# Patient Record
Sex: MALE | Race: WHITE | NOT HISPANIC OR LATINO
[De-identification: names, ages, dates, MRNs, and addresses within clinical notes are randomized per-mention and may not be internally consistent; named-entity substitution may affect disease eponyms.]

---

## 2017-01-05 ENCOUNTER — APPOINTMENT (OUTPATIENT)
Age: 57
End: 2017-01-05

## 2017-03-02 ENCOUNTER — APPOINTMENT (OUTPATIENT)
Age: 57
End: 2017-03-02

## 2017-05-18 ENCOUNTER — APPOINTMENT (OUTPATIENT)
Age: 57
End: 2017-05-18

## 2017-05-18 ENCOUNTER — OUTPATIENT (OUTPATIENT)
Dept: OUTPATIENT SERVICES | Facility: HOSPITAL | Age: 57
LOS: 1 days | Discharge: HOME | End: 2017-05-18

## 2017-06-28 DIAGNOSIS — L89.314 PRESSURE ULCER OF RIGHT BUTTOCK, STAGE 4: ICD-10-CM

## 2017-06-28 DIAGNOSIS — L89.154 PRESSURE ULCER OF SACRAL REGION, STAGE 4: ICD-10-CM

## 2017-06-28 DIAGNOSIS — M53.3 SACROCOCCYGEAL DISORDERS, NOT ELSEWHERE CLASSIFIED: ICD-10-CM

## 2017-07-20 ENCOUNTER — APPOINTMENT (OUTPATIENT)
Age: 57
End: 2017-07-20

## 2017-07-20 ENCOUNTER — OUTPATIENT (OUTPATIENT)
Dept: OUTPATIENT SERVICES | Facility: HOSPITAL | Age: 57
LOS: 1 days | Discharge: HOME | End: 2017-07-20

## 2017-07-20 ENCOUNTER — INPATIENT (INPATIENT)
Facility: HOSPITAL | Age: 57
LOS: 6 days | Discharge: HOME HEALTH | End: 2017-07-27
Attending: PLASTIC SURGERY

## 2017-07-20 DIAGNOSIS — E11.9 TYPE 2 DIABETES MELLITUS WITHOUT COMPLICATIONS: ICD-10-CM

## 2017-07-20 DIAGNOSIS — L89.159 PRESSURE ULCER OF SACRAL REGION, UNSPECIFIED STAGE: ICD-10-CM

## 2017-07-20 DIAGNOSIS — E11.621 TYPE 2 DIABETES MELLITUS WITH FOOT ULCER: ICD-10-CM

## 2017-07-20 DIAGNOSIS — K21.9 GASTRO-ESOPHAGEAL REFLUX DISEASE WITHOUT ESOPHAGITIS: ICD-10-CM

## 2017-07-20 DIAGNOSIS — L89.309 PRESSURE ULCER OF UNSPECIFIED BUTTOCK, UNSPECIFIED STAGE: ICD-10-CM

## 2017-07-21 DIAGNOSIS — Z02.9 ENCOUNTER FOR ADMINISTRATIVE EXAMINATIONS, UNSPECIFIED: ICD-10-CM

## 2017-07-30 DIAGNOSIS — Z79.84 LONG TERM (CURRENT) USE OF ORAL HYPOGLYCEMIC DRUGS: ICD-10-CM

## 2017-07-30 DIAGNOSIS — G89.29 OTHER CHRONIC PAIN: ICD-10-CM

## 2017-07-30 DIAGNOSIS — E11.621 TYPE 2 DIABETES MELLITUS WITH FOOT ULCER: ICD-10-CM

## 2017-07-30 DIAGNOSIS — F17.210 NICOTINE DEPENDENCE, CIGARETTES, UNCOMPLICATED: ICD-10-CM

## 2017-07-30 DIAGNOSIS — G82.20 PARAPLEGIA, UNSPECIFIED: ICD-10-CM

## 2017-07-30 DIAGNOSIS — S34.101S: ICD-10-CM

## 2017-07-30 DIAGNOSIS — L97.514 NON-PRESSURE CHRONIC ULCER OF OTHER PART OF RIGHT FOOT WITH NECROSIS OF BONE: ICD-10-CM

## 2017-07-30 DIAGNOSIS — L89.154 PRESSURE ULCER OF SACRAL REGION, STAGE 4: ICD-10-CM

## 2017-07-30 DIAGNOSIS — L02.611 CUTANEOUS ABSCESS OF RIGHT FOOT: ICD-10-CM
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
## 2017-07-30 DIAGNOSIS — I96 GANGRENE, NOT ELSEWHERE CLASSIFIED: ICD-10-CM

## 2017-07-30 DIAGNOSIS — E11.69 TYPE 2 DIABETES MELLITUS WITH OTHER SPECIFIED COMPLICATION: ICD-10-CM

## 2017-07-30 DIAGNOSIS — M66.271 SPONTANEOUS RUPTURE OF EXTENSOR TENDONS, RIGHT ANKLE AND FOOT: ICD-10-CM

## 2017-07-30 DIAGNOSIS — Z79.891 LONG TERM (CURRENT) USE OF OPIATE ANALGESIC: ICD-10-CM

## 2017-07-30 DIAGNOSIS — L97.519 NON-PRESSURE CHRONIC ULCER OF OTHER PART OF RIGHT FOOT WITH UNSPECIFIED SEVERITY: ICD-10-CM

## 2017-07-30 DIAGNOSIS — M86.8X7 OTHER OSTEOMYELITIS, ANKLE AND FOOT: ICD-10-CM

## 2017-08-08 ENCOUNTER — OUTPATIENT (OUTPATIENT)
Dept: OUTPATIENT SERVICES | Facility: HOSPITAL | Age: 57
LOS: 1 days | Discharge: HOME | End: 2017-08-08

## 2017-08-08 ENCOUNTER — APPOINTMENT (OUTPATIENT)
Dept: PODIATRY | Facility: HOSPITAL | Age: 57
End: 2017-08-08

## 2017-08-08 DIAGNOSIS — L89.309 PRESSURE ULCER OF UNSPECIFIED BUTTOCK, UNSPECIFIED STAGE: ICD-10-CM

## 2017-08-08 DIAGNOSIS — E11.621 TYPE 2 DIABETES MELLITUS WITH FOOT ULCER: ICD-10-CM

## 2017-08-08 DIAGNOSIS — L89.159 PRESSURE ULCER OF SACRAL REGION, UNSPECIFIED STAGE: ICD-10-CM

## 2017-08-08 DIAGNOSIS — E11.9 TYPE 2 DIABETES MELLITUS WITHOUT COMPLICATIONS: ICD-10-CM

## 2017-08-08 DIAGNOSIS — K21.9 GASTRO-ESOPHAGEAL REFLUX DISEASE WITHOUT ESOPHAGITIS: ICD-10-CM

## 2017-08-09 DIAGNOSIS — I45.81 LONG QT SYNDROME: ICD-10-CM

## 2017-08-15 DIAGNOSIS — M20.5X9 OTHER DEFORMITIES OF TOE(S) (ACQUIRED), UNSPECIFIED FOOT: ICD-10-CM

## 2017-09-08 ENCOUNTER — INPATIENT (INPATIENT)
Facility: HOSPITAL | Age: 57
LOS: 12 days | Discharge: HOME HEALTH | End: 2017-09-21
Attending: PLASTIC SURGERY

## 2017-09-08 DIAGNOSIS — E11.621 TYPE 2 DIABETES MELLITUS WITH FOOT ULCER: ICD-10-CM

## 2017-09-08 DIAGNOSIS — L89.159 PRESSURE ULCER OF SACRAL REGION, UNSPECIFIED STAGE: ICD-10-CM

## 2017-09-08 DIAGNOSIS — K21.9 GASTRO-ESOPHAGEAL REFLUX DISEASE WITHOUT ESOPHAGITIS: ICD-10-CM

## 2017-09-08 DIAGNOSIS — L89.309 PRESSURE ULCER OF UNSPECIFIED BUTTOCK, UNSPECIFIED STAGE: ICD-10-CM

## 2017-09-08 DIAGNOSIS — E11.9 TYPE 2 DIABETES MELLITUS WITHOUT COMPLICATIONS: ICD-10-CM

## 2017-09-18 ENCOUNTER — APPOINTMENT (OUTPATIENT)
Age: 57
End: 2017-09-18
Payer: MEDICARE

## 2017-09-18 PROCEDURE — 75625 CONTRAST EXAM ABDOMINL AORTA: CPT | Mod: 26

## 2017-09-18 PROCEDURE — 37228: CPT | Mod: LT

## 2017-09-18 PROCEDURE — 37223: CPT | Mod: LT

## 2017-09-18 PROCEDURE — 76937 US GUIDE VASCULAR ACCESS: CPT | Mod: 26

## 2017-09-18 PROCEDURE — 75710 ARTERY X-RAYS ARM/LEG: CPT | Mod: 26,59

## 2017-09-18 PROCEDURE — 36247 INS CATH ABD/L-EXT ART 3RD: CPT | Mod: 59,LT

## 2017-09-26 DIAGNOSIS — Z86.73 PERSONAL HISTORY OF TRANSIENT ISCHEMIC ATTACK (TIA), AND CEREBRAL INFARCTION WITHOUT RESIDUAL DEFICITS: ICD-10-CM

## 2017-09-26 DIAGNOSIS — Z79.84 LONG TERM (CURRENT) USE OF ORAL HYPOGLYCEMIC DRUGS: ICD-10-CM

## 2017-09-26 DIAGNOSIS — E88.09 OTHER DISORDERS OF PLASMA-PROTEIN METABOLISM, NOT ELSEWHERE CLASSIFIED: ICD-10-CM

## 2017-09-26 DIAGNOSIS — E11.649 TYPE 2 DIABETES MELLITUS WITH HYPOGLYCEMIA WITHOUT COMA: ICD-10-CM

## 2017-09-26 DIAGNOSIS — G82.20 PARAPLEGIA, UNSPECIFIED: ICD-10-CM

## 2017-09-26 DIAGNOSIS — Z88.2 ALLERGY STATUS TO SULFONAMIDES: ICD-10-CM

## 2017-09-26 DIAGNOSIS — E11.621 TYPE 2 DIABETES MELLITUS WITH FOOT ULCER: ICD-10-CM

## 2017-09-26 DIAGNOSIS — L03.115 CELLULITIS OF RIGHT LOWER LIMB: ICD-10-CM

## 2017-09-26 DIAGNOSIS — L89.154 PRESSURE ULCER OF SACRAL REGION, STAGE 4: ICD-10-CM

## 2017-09-26 DIAGNOSIS — F11.20 OPIOID DEPENDENCE, UNCOMPLICATED: ICD-10-CM

## 2017-09-26 DIAGNOSIS — E11.52 TYPE 2 DIABETES MELLITUS WITH DIABETIC PERIPHERAL ANGIOPATHY WITH GANGRENE: ICD-10-CM

## 2017-09-26 DIAGNOSIS — L89.314 PRESSURE ULCER OF RIGHT BUTTOCK, STAGE 4: ICD-10-CM

## 2017-09-26 DIAGNOSIS — Z83.3 FAMILY HISTORY OF DIABETES MELLITUS: ICD-10-CM

## 2017-09-26 DIAGNOSIS — I10 ESSENTIAL (PRIMARY) HYPERTENSION: ICD-10-CM

## 2017-09-26 DIAGNOSIS — F17.200 NICOTINE DEPENDENCE, UNSPECIFIED, UNCOMPLICATED: ICD-10-CM

## 2017-09-26 DIAGNOSIS — L03.116 CELLULITIS OF LEFT LOWER LIMB: ICD-10-CM

## 2017-09-26 DIAGNOSIS — Z89.411 ACQUIRED ABSENCE OF RIGHT GREAT TOE: ICD-10-CM

## 2017-09-26 DIAGNOSIS — F12.10 CANNABIS ABUSE, UNCOMPLICATED: ICD-10-CM

## 2017-09-26 DIAGNOSIS — G89.4 CHRONIC PAIN SYNDROME: ICD-10-CM

## 2017-09-26 DIAGNOSIS — L97.419 NON-PRESSURE CHRONIC ULCER OF RIGHT HEEL AND MIDFOOT WITH UNSPECIFIED SEVERITY: ICD-10-CM

## 2017-09-26 DIAGNOSIS — D64.9 ANEMIA, UNSPECIFIED: ICD-10-CM

## 2017-09-26 DIAGNOSIS — L97.519 NON-PRESSURE CHRONIC ULCER OF OTHER PART OF RIGHT FOOT WITH UNSPECIFIED SEVERITY: ICD-10-CM

## 2017-09-26 DIAGNOSIS — L89.324 PRESSURE ULCER OF LEFT BUTTOCK, STAGE 4: ICD-10-CM

## 2017-09-26 DIAGNOSIS — L97.429 NON-PRESSURE CHRONIC ULCER OF LEFT HEEL AND MIDFOOT WITH UNSPECIFIED SEVERITY: ICD-10-CM

## 2017-09-26 DIAGNOSIS — Z99.3 DEPENDENCE ON WHEELCHAIR: ICD-10-CM

## 2017-09-26 DIAGNOSIS — Z51.89 ENCOUNTER FOR OTHER SPECIFIED AFTERCARE: ICD-10-CM

## 2017-09-26 DIAGNOSIS — F41.9 ANXIETY DISORDER, UNSPECIFIED: ICD-10-CM

## 2017-10-04 DIAGNOSIS — L97.529 NON-PRESSURE CHRONIC ULCER OF OTHER PART OF LEFT FOOT WITH UNSPECIFIED SEVERITY: ICD-10-CM

## 2017-10-05 ENCOUNTER — OUTPATIENT (OUTPATIENT)
Dept: OUTPATIENT SERVICES | Facility: HOSPITAL | Age: 57
LOS: 1 days | Discharge: HOME | End: 2017-10-05

## 2017-10-05 ENCOUNTER — APPOINTMENT (OUTPATIENT)
Age: 57
End: 2017-10-05

## 2017-10-05 DIAGNOSIS — L89.309 PRESSURE ULCER OF UNSPECIFIED BUTTOCK, UNSPECIFIED STAGE: ICD-10-CM

## 2017-10-05 DIAGNOSIS — L89.892 PRESSURE ULCER OF OTHER SITE, STAGE 2: ICD-10-CM

## 2017-10-05 DIAGNOSIS — E11.621 TYPE 2 DIABETES MELLITUS WITH FOOT ULCER: ICD-10-CM

## 2017-10-05 DIAGNOSIS — L89.154 PRESSURE ULCER OF SACRAL REGION, STAGE 4: ICD-10-CM

## 2017-10-05 DIAGNOSIS — K21.9 GASTRO-ESOPHAGEAL REFLUX DISEASE WITHOUT ESOPHAGITIS: ICD-10-CM

## 2017-10-05 DIAGNOSIS — E11.9 TYPE 2 DIABETES MELLITUS WITHOUT COMPLICATIONS: ICD-10-CM

## 2017-10-05 DIAGNOSIS — L89.159 PRESSURE ULCER OF SACRAL REGION, UNSPECIFIED STAGE: ICD-10-CM

## 2017-10-05 DIAGNOSIS — L89.314 PRESSURE ULCER OF RIGHT BUTTOCK, STAGE 4: ICD-10-CM

## 2017-10-10 ENCOUNTER — OUTPATIENT (OUTPATIENT)
Dept: OUTPATIENT SERVICES | Facility: HOSPITAL | Age: 57
LOS: 1 days | Discharge: HOME | End: 2017-10-10

## 2017-10-10 ENCOUNTER — APPOINTMENT (OUTPATIENT)
Dept: PODIATRY | Facility: HOSPITAL | Age: 57
End: 2017-10-10

## 2017-10-10 DIAGNOSIS — L89.159 PRESSURE ULCER OF SACRAL REGION, UNSPECIFIED STAGE: ICD-10-CM

## 2017-10-10 DIAGNOSIS — E11.621 TYPE 2 DIABETES MELLITUS WITH FOOT ULCER: ICD-10-CM

## 2017-10-10 DIAGNOSIS — E11.9 TYPE 2 DIABETES MELLITUS WITHOUT COMPLICATIONS: ICD-10-CM

## 2017-10-10 DIAGNOSIS — G82.20 PARAPLEGIA, UNSPECIFIED: ICD-10-CM

## 2017-10-10 DIAGNOSIS — K21.9 GASTRO-ESOPHAGEAL REFLUX DISEASE WITHOUT ESOPHAGITIS: ICD-10-CM

## 2017-10-10 DIAGNOSIS — L89.309 PRESSURE ULCER OF UNSPECIFIED BUTTOCK, UNSPECIFIED STAGE: ICD-10-CM

## 2017-10-11 ENCOUNTER — APPOINTMENT (OUTPATIENT)
Dept: VASCULAR SURGERY | Facility: CLINIC | Age: 57
End: 2017-10-11
Payer: MEDICARE

## 2017-10-11 VITALS
SYSTOLIC BLOOD PRESSURE: 130 MMHG | DIASTOLIC BLOOD PRESSURE: 80 MMHG | WEIGHT: 180 LBS | BODY MASS INDEX: 23.86 KG/M2 | HEIGHT: 73 IN

## 2017-10-11 DIAGNOSIS — L89.892 PRESSURE ULCER OF OTHER SITE, STAGE 2: ICD-10-CM

## 2017-10-11 PROCEDURE — 99213 OFFICE O/P EST LOW 20 MIN: CPT

## 2017-10-11 PROCEDURE — 93926 LOWER EXTREMITY STUDY: CPT

## 2017-10-30 ENCOUNTER — APPOINTMENT (OUTPATIENT)
Dept: VASCULAR SURGERY | Facility: HOSPITAL | Age: 57
End: 2017-10-30
Payer: MEDICARE

## 2017-10-30 ENCOUNTER — OUTPATIENT (OUTPATIENT)
Dept: OUTPATIENT SERVICES | Facility: HOSPITAL | Age: 57
LOS: 1 days | Discharge: HOME | End: 2017-10-30

## 2017-10-30 DIAGNOSIS — L89.159 PRESSURE ULCER OF SACRAL REGION, UNSPECIFIED STAGE: ICD-10-CM

## 2017-10-30 DIAGNOSIS — K21.9 GASTRO-ESOPHAGEAL REFLUX DISEASE WITHOUT ESOPHAGITIS: ICD-10-CM

## 2017-10-30 DIAGNOSIS — L89.309 PRESSURE ULCER OF UNSPECIFIED BUTTOCK, UNSPECIFIED STAGE: ICD-10-CM

## 2017-10-30 DIAGNOSIS — E11.9 TYPE 2 DIABETES MELLITUS WITHOUT COMPLICATIONS: ICD-10-CM

## 2017-10-30 DIAGNOSIS — E11.621 TYPE 2 DIABETES MELLITUS WITH FOOT ULCER: ICD-10-CM

## 2017-10-30 PROCEDURE — 36247 INS CATH ABD/L-EXT ART 3RD: CPT | Mod: 59,RT

## 2017-10-30 PROCEDURE — 37229: CPT | Mod: RT

## 2017-10-30 PROCEDURE — 76937 US GUIDE VASCULAR ACCESS: CPT | Mod: 26

## 2017-10-30 PROCEDURE — 75710 ARTERY X-RAYS ARM/LEG: CPT | Mod: 26,59

## 2017-11-01 DIAGNOSIS — I70.235 ATHEROSCLEROSIS OF NATIVE ARTERIES OF RIGHT LEG WITH ULCERATION OF OTHER PART OF FOOT: ICD-10-CM

## 2017-11-01 DIAGNOSIS — Z88.2 ALLERGY STATUS TO SULFONAMIDES: ICD-10-CM

## 2017-11-01 DIAGNOSIS — I70.234 ATHEROSCLEROSIS OF NATIVE ARTERIES OF RIGHT LEG WITH ULCERATION OF HEEL AND MIDFOOT: ICD-10-CM

## 2017-11-09 ENCOUNTER — APPOINTMENT (OUTPATIENT)
Age: 57
End: 2017-11-09

## 2017-11-09 ENCOUNTER — OUTPATIENT (OUTPATIENT)
Dept: OUTPATIENT SERVICES | Facility: HOSPITAL | Age: 57
LOS: 1 days | Discharge: HOME | End: 2017-11-09

## 2017-11-09 DIAGNOSIS — E11.9 TYPE 2 DIABETES MELLITUS WITHOUT COMPLICATIONS: ICD-10-CM

## 2017-11-09 DIAGNOSIS — L89.159 PRESSURE ULCER OF SACRAL REGION, UNSPECIFIED STAGE: ICD-10-CM

## 2017-11-09 DIAGNOSIS — L89.309 PRESSURE ULCER OF UNSPECIFIED BUTTOCK, UNSPECIFIED STAGE: ICD-10-CM

## 2017-11-09 DIAGNOSIS — K21.9 GASTRO-ESOPHAGEAL REFLUX DISEASE WITHOUT ESOPHAGITIS: ICD-10-CM

## 2017-11-09 DIAGNOSIS — E11.621 TYPE 2 DIABETES MELLITUS WITH FOOT ULCER: ICD-10-CM

## 2017-11-15 ENCOUNTER — APPOINTMENT (OUTPATIENT)
Dept: VASCULAR SURGERY | Facility: CLINIC | Age: 57
End: 2017-11-15
Payer: MEDICARE

## 2017-11-15 DIAGNOSIS — M53.3 SACROCOCCYGEAL DISORDERS, NOT ELSEWHERE CLASSIFIED: ICD-10-CM

## 2017-11-15 DIAGNOSIS — L89.622 PRESSURE ULCER OF LEFT HEEL, STAGE 2: ICD-10-CM

## 2017-11-15 DIAGNOSIS — L89.612 PRESSURE ULCER OF RIGHT HEEL, STAGE 2: ICD-10-CM

## 2017-11-15 DIAGNOSIS — L89.154 PRESSURE ULCER OF SACRAL REGION, STAGE 4: ICD-10-CM

## 2017-11-15 PROCEDURE — 93926 LOWER EXTREMITY STUDY: CPT

## 2017-12-14 ENCOUNTER — APPOINTMENT (OUTPATIENT)
Age: 57
End: 2017-12-14

## 2017-12-28 ENCOUNTER — INPATIENT (INPATIENT)
Facility: HOSPITAL | Age: 57
LOS: 11 days | Discharge: HOME HEALTH | End: 2018-01-09
Attending: PLASTIC SURGERY

## 2017-12-28 ENCOUNTER — OUTPATIENT (OUTPATIENT)
Dept: OUTPATIENT SERVICES | Facility: HOSPITAL | Age: 57
LOS: 1 days | Discharge: HOME | End: 2017-12-28

## 2017-12-28 ENCOUNTER — APPOINTMENT (OUTPATIENT)
Age: 57
End: 2017-12-28

## 2017-12-28 DIAGNOSIS — L89.159 PRESSURE ULCER OF SACRAL REGION, UNSPECIFIED STAGE: ICD-10-CM

## 2017-12-28 DIAGNOSIS — L89.309 PRESSURE ULCER OF UNSPECIFIED BUTTOCK, UNSPECIFIED STAGE: ICD-10-CM

## 2017-12-28 DIAGNOSIS — E11.9 TYPE 2 DIABETES MELLITUS WITHOUT COMPLICATIONS: ICD-10-CM

## 2017-12-28 DIAGNOSIS — E11.621 TYPE 2 DIABETES MELLITUS WITH FOOT ULCER: ICD-10-CM

## 2017-12-28 DIAGNOSIS — K21.9 GASTRO-ESOPHAGEAL REFLUX DISEASE WITHOUT ESOPHAGITIS: ICD-10-CM

## 2017-12-29 DIAGNOSIS — Z02.9 ENCOUNTER FOR ADMINISTRATIVE EXAMINATIONS, UNSPECIFIED: ICD-10-CM

## 2018-01-14 DIAGNOSIS — L89.614 PRESSURE ULCER OF RIGHT HEEL, STAGE 4: ICD-10-CM

## 2018-01-14 DIAGNOSIS — G82.20 PARAPLEGIA, UNSPECIFIED: ICD-10-CM

## 2018-01-14 DIAGNOSIS — I10 ESSENTIAL (PRIMARY) HYPERTENSION: ICD-10-CM

## 2018-01-14 DIAGNOSIS — L03.115 CELLULITIS OF RIGHT LOWER LIMB: ICD-10-CM

## 2018-01-14 DIAGNOSIS — L89.514 PRESSURE ULCER OF RIGHT ANKLE, STAGE 4: ICD-10-CM
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
## 2018-01-14 DIAGNOSIS — S32.011S: ICD-10-CM

## 2018-01-14 DIAGNOSIS — F64.9 GENDER IDENTITY DISORDER, UNSPECIFIED: ICD-10-CM

## 2018-01-14 DIAGNOSIS — M86.171 OTHER ACUTE OSTEOMYELITIS, RIGHT ANKLE AND FOOT: ICD-10-CM

## 2018-01-14 DIAGNOSIS — K21.9 GASTRO-ESOPHAGEAL REFLUX DISEASE WITHOUT ESOPHAGITIS: ICD-10-CM
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
## 2018-01-14 DIAGNOSIS — E11.69 TYPE 2 DIABETES MELLITUS WITH OTHER SPECIFIED COMPLICATION: ICD-10-CM

## 2018-01-14 DIAGNOSIS — Z96.0 PRESENCE OF UROGENITAL IMPLANTS: ICD-10-CM

## 2018-01-14 DIAGNOSIS — L97.514 NON-PRESSURE CHRONIC ULCER OF OTHER PART OF RIGHT FOOT WITH NECROSIS OF BONE: ICD-10-CM

## 2018-01-14 DIAGNOSIS — S34.101S: ICD-10-CM

## 2018-01-14 DIAGNOSIS — F12.90 CANNABIS USE, UNSPECIFIED, UNCOMPLICATED: ICD-10-CM

## 2018-01-14 DIAGNOSIS — E11.621 TYPE 2 DIABETES MELLITUS WITH FOOT ULCER: ICD-10-CM

## 2018-01-14 DIAGNOSIS — I96 GANGRENE, NOT ELSEWHERE CLASSIFIED: ICD-10-CM

## 2018-01-14 DIAGNOSIS — L89.153 PRESSURE ULCER OF SACRAL REGION, STAGE 3: ICD-10-CM

## 2018-01-14 DIAGNOSIS — F17.210 NICOTINE DEPENDENCE, CIGARETTES, UNCOMPLICATED: ICD-10-CM

## 2018-02-01 ENCOUNTER — OUTPATIENT (OUTPATIENT)
Dept: OUTPATIENT SERVICES | Facility: HOSPITAL | Age: 58
LOS: 1 days | Discharge: HOME | End: 2018-02-01

## 2018-02-01 ENCOUNTER — APPOINTMENT (OUTPATIENT)
Age: 58
End: 2018-02-01

## 2018-02-01 DIAGNOSIS — L02.611 CUTANEOUS ABSCESS OF RIGHT FOOT: ICD-10-CM

## 2018-02-01 DIAGNOSIS — L89.314 PRESSURE ULCER OF RIGHT BUTTOCK, STAGE 4: ICD-10-CM

## 2018-02-01 DIAGNOSIS — Z89.419 ACQUIRED ABSENCE OF UNSPECIFIED GREAT TOE: ICD-10-CM

## 2018-02-01 DIAGNOSIS — L89.154 PRESSURE ULCER OF SACRAL REGION, STAGE 4: ICD-10-CM

## 2018-02-01 DIAGNOSIS — L89.892 PRESSURE ULCER OF OTHER SITE, STAGE 2: ICD-10-CM

## 2018-02-04 DIAGNOSIS — L89.159 PRESSURE ULCER OF SACRAL REGION, UNSPECIFIED STAGE: ICD-10-CM

## 2018-02-04 DIAGNOSIS — K21.9 GASTRO-ESOPHAGEAL REFLUX DISEASE WITHOUT ESOPHAGITIS: ICD-10-CM

## 2018-02-04 DIAGNOSIS — E11.621 TYPE 2 DIABETES MELLITUS WITH FOOT ULCER: ICD-10-CM

## 2018-02-04 DIAGNOSIS — E11.9 TYPE 2 DIABETES MELLITUS WITHOUT COMPLICATIONS: ICD-10-CM

## 2018-02-04 DIAGNOSIS — L89.309 PRESSURE ULCER OF UNSPECIFIED BUTTOCK, UNSPECIFIED STAGE: ICD-10-CM

## 2018-02-12 DIAGNOSIS — L02.611 CUTANEOUS ABSCESS OF RIGHT FOOT: ICD-10-CM

## 2018-02-12 DIAGNOSIS — L89.622 PRESSURE ULCER OF LEFT HEEL, STAGE 2: ICD-10-CM

## 2018-02-12 DIAGNOSIS — L89.314 PRESSURE ULCER OF RIGHT BUTTOCK, STAGE 4: ICD-10-CM

## 2018-02-12 DIAGNOSIS — L89.154 PRESSURE ULCER OF SACRAL REGION, STAGE 4: ICD-10-CM

## 2018-02-12 DIAGNOSIS — L89.612 PRESSURE ULCER OF RIGHT HEEL, STAGE 2: ICD-10-CM

## 2018-03-07 ENCOUNTER — EMERGENCY (EMERGENCY)
Facility: HOSPITAL | Age: 58
LOS: 0 days | Discharge: HOME | End: 2018-03-08
Attending: EMERGENCY MEDICINE

## 2018-03-07 VITALS
DIASTOLIC BLOOD PRESSURE: 147 MMHG | OXYGEN SATURATION: 98 % | TEMPERATURE: 98 F | SYSTOLIC BLOOD PRESSURE: 239 MMHG | WEIGHT: 154.98 LBS | HEIGHT: 71 IN | RESPIRATION RATE: 18 BRPM | HEART RATE: 83 BPM

## 2018-03-07 DIAGNOSIS — R47.81 SLURRED SPEECH: ICD-10-CM

## 2018-03-07 DIAGNOSIS — F11.90 OPIOID USE, UNSPECIFIED, UNCOMPLICATED: ICD-10-CM

## 2018-03-07 DIAGNOSIS — T42.4X5A ADVERSE EFFECT OF BENZODIAZEPINES, INITIAL ENCOUNTER: ICD-10-CM

## 2018-03-07 NOTE — ED PROVIDER NOTE - CARE PLAN
Principal Discharge DX:	Benzodiazepine causing adverse effect in therapeutic use  Secondary Diagnosis:	Opiate use

## 2018-03-07 NOTE — ED PROVIDER NOTE - NS ED ROS FT
Constitutional: no fever, chills, no recent weight loss  Cardiac: No chest pain, SOB or edema.  Respiratory: No cough or respiratory distress  GI: No nausea, vomiting, diarrhea or abdominal pain.  : No dysuria, frequency, urgency or hematuria  MS: no pain to back or extremities, no loss of ROM, no weakness  Neuro: paraplegia  Skin: No skin rash.  Except as documented in the HPI, all other systems are negative.

## 2018-03-07 NOTE — ED ADULT TRIAGE NOTE - CHIEF COMPLAINT QUOTE
BIBA, found unconscious behind the  wheel of his car, patient awake, talking, and reports he does not know what happened that he did not get enough sleep

## 2018-03-07 NOTE — ED PROVIDER NOTE - OBJECTIVE STATEMENT
56 yo M with history of paraplegia, on multiple narcotics, methadone, roxycodone, valium and xanax for chronic pain, muscle spasm etc, brought in by EMS after being found in his car, facing opposite direction on [facility], asleep. No signs of damage to the car, no physical signs of trauma. The patient is awake, alert, has slightly slurred speech but has no complaints. He is not bradypneic or bradycardic.

## 2018-03-07 NOTE — ED PROVIDER NOTE - PHYSICAL EXAMINATION
VITAL SIGNS: I have reviewed nursing notes and confirm.  CONSTITUTIONAL: Well-developed; well-nourished  SKIN: Skin exam is warm and dry, large decub to buttock, posterior thigh  HEAD: Normocephalic; atraumatic.  EYES: pupils are small but reactive  ENT: No nasal discharge  NECK: Supple; non tender.  CARD: Regular rate and rhythm.  RESP: No wheezes, rales or rhonchi.  ABD: Normal bowel sounds; soft; non-distended; non-tender  EXT: contracted at ankles, partial paralysis bilateral LE  NEURO: answering questions, mildly slurred speech, moving upper extremities, CN II-XII intacts  PSYCH: Cooperative, appropriate.

## 2018-03-07 NOTE — ED PROVIDER NOTE - MEDICAL DECISION MAKING DETAILS
Patient has maintained normal VS -- is awake, alert, in no distress, has normal sized reactive pupils. Transient AMS likely related to prescription drug use. Will dc home.

## 2018-03-08 VITALS
RESPIRATION RATE: 18 BRPM | SYSTOLIC BLOOD PRESSURE: 143 MMHG | OXYGEN SATURATION: 96 % | HEART RATE: 78 BPM | DIASTOLIC BLOOD PRESSURE: 75 MMHG

## 2018-03-15 ENCOUNTER — APPOINTMENT (OUTPATIENT)
Age: 58
End: 2018-03-15

## 2018-05-16 ENCOUNTER — APPOINTMENT (OUTPATIENT)
Dept: VASCULAR SURGERY | Facility: CLINIC | Age: 58
End: 2018-05-16

## 2018-07-17 PROBLEM — S32.000A WEDGE COMPRESSION FRACTURE OF UNSPECIFIED LUMBAR VERTEBRA, INITIAL ENCOUNTER FOR CLOSED FRACTURE: Chronic | Status: ACTIVE | Noted: 2018-03-07

## 2018-07-17 PROBLEM — E11.9 TYPE 2 DIABETES MELLITUS WITHOUT COMPLICATIONS: Chronic | Status: ACTIVE | Noted: 2018-03-07

## 2018-07-26 ENCOUNTER — INPATIENT (INPATIENT)
Facility: HOSPITAL | Age: 58
LOS: 9 days | Discharge: HOME HEALTH | End: 2018-08-05
Attending: INTERNAL MEDICINE | Admitting: INTERNAL MEDICINE
Payer: MEDICARE

## 2018-07-26 VITALS
HEART RATE: 98 BPM | TEMPERATURE: 98 F | SYSTOLIC BLOOD PRESSURE: 105 MMHG | RESPIRATION RATE: 18 BRPM | OXYGEN SATURATION: 98 % | DIASTOLIC BLOOD PRESSURE: 54 MMHG

## 2018-07-26 DIAGNOSIS — Z98.890 OTHER SPECIFIED POSTPROCEDURAL STATES: Chronic | ICD-10-CM

## 2018-07-26 LAB
ALBUMIN SERPL ELPH-MCNC: 2.3 G/DL — LOW (ref 3.5–5.2)
ALP SERPL-CCNC: 131 U/L — HIGH (ref 30–115)
ALT FLD-CCNC: 8 U/L — SIGNIFICANT CHANGE UP (ref 0–41)
ANION GAP SERPL CALC-SCNC: 17 MMOL/L — HIGH (ref 7–14)
APPEARANCE UR: ABNORMAL
APTT BLD: 36.8 SEC — SIGNIFICANT CHANGE UP (ref 27–39.2)
AST SERPL-CCNC: 13 U/L — SIGNIFICANT CHANGE UP (ref 0–41)
BASOPHILS # BLD AUTO: 0.03 K/UL — SIGNIFICANT CHANGE UP (ref 0–0.2)
BASOPHILS NFR BLD AUTO: 0.2 % — SIGNIFICANT CHANGE UP (ref 0–1)
BILIRUB SERPL-MCNC: <0.2 MG/DL — SIGNIFICANT CHANGE UP (ref 0.2–1.2)
BILIRUB UR-MCNC: ABNORMAL
BUN SERPL-MCNC: 34 MG/DL — HIGH (ref 10–20)
CALCIUM SERPL-MCNC: 8.3 MG/DL — LOW (ref 8.5–10.1)
CHLORIDE SERPL-SCNC: 95 MMOL/L — LOW (ref 98–110)
CO2 SERPL-SCNC: 26 MMOL/L — SIGNIFICANT CHANGE UP (ref 17–32)
COLOR SPEC: ABNORMAL
CREAT SERPL-MCNC: 1.3 MG/DL — SIGNIFICANT CHANGE UP (ref 0.7–1.5)
DIFF PNL FLD: ABNORMAL
EOSINOPHIL # BLD AUTO: 0.01 K/UL — SIGNIFICANT CHANGE UP (ref 0–0.7)
EOSINOPHIL NFR BLD AUTO: 0.1 % — SIGNIFICANT CHANGE UP (ref 0–8)
GLUCOSE SERPL-MCNC: 99 MG/DL — SIGNIFICANT CHANGE UP (ref 70–99)
GLUCOSE UR QL: NEGATIVE — SIGNIFICANT CHANGE UP
HCT VFR BLD CALC: 31.4 % — LOW (ref 42–52)
HGB BLD-MCNC: 9.9 G/DL — LOW (ref 14–18)
IMM GRANULOCYTES NFR BLD AUTO: 0.8 % — HIGH (ref 0.1–0.3)
INR BLD: 2.35 RATIO — HIGH (ref 0.65–1.3)
KETONES UR-MCNC: NEGATIVE — SIGNIFICANT CHANGE UP
LACTATE SERPL-SCNC: 1.7 MMOL/L — SIGNIFICANT CHANGE UP (ref 0.5–2.2)
LEUKOCYTE ESTERASE UR-ACNC: ABNORMAL
LIDOCAIN IGE QN: 9 U/L — SIGNIFICANT CHANGE UP (ref 7–60)
LYMPHOCYTES # BLD AUTO: 1.87 K/UL — SIGNIFICANT CHANGE UP (ref 1.2–3.4)
LYMPHOCYTES # BLD AUTO: 14.1 % — LOW (ref 20.5–51.1)
MCHC RBC-ENTMCNC: 24.1 PG — LOW (ref 27–31)
MCHC RBC-ENTMCNC: 31.5 G/DL — LOW (ref 32–37)
MCV RBC AUTO: 76.4 FL — LOW (ref 80–94)
MONOCYTES # BLD AUTO: 1.03 K/UL — HIGH (ref 0.1–0.6)
MONOCYTES NFR BLD AUTO: 7.8 % — SIGNIFICANT CHANGE UP (ref 1.7–9.3)
NEUTROPHILS # BLD AUTO: 10.21 K/UL — HIGH (ref 1.4–6.5)
NEUTROPHILS NFR BLD AUTO: 77 % — HIGH (ref 42.2–75.2)
NITRITE UR-MCNC: POSITIVE
PH UR: 6 — SIGNIFICANT CHANGE UP (ref 5–8)
PLATELET # BLD AUTO: 514 K/UL — HIGH (ref 130–400)
POTASSIUM SERPL-MCNC: 4.7 MMOL/L — SIGNIFICANT CHANGE UP (ref 3.5–5)
POTASSIUM SERPL-SCNC: 4.7 MMOL/L — SIGNIFICANT CHANGE UP (ref 3.5–5)
PROT SERPL-MCNC: 6.1 G/DL — SIGNIFICANT CHANGE UP (ref 6–8)
PROT UR-MCNC: >=300
PROTHROM AB SERPL-ACNC: 25.2 SEC — HIGH (ref 9.95–12.87)
RBC # BLD: 4.11 M/UL — LOW (ref 4.7–6.1)
RBC # FLD: 15.8 % — HIGH (ref 11.5–14.5)
SODIUM SERPL-SCNC: 138 MMOL/L — SIGNIFICANT CHANGE UP (ref 135–146)
SP GR SPEC: >=1.03 — SIGNIFICANT CHANGE UP (ref 1.01–1.03)
UROBILINOGEN FLD QL: 1 (ref 0.2–0.2)
WBC # BLD: 13.25 K/UL — HIGH (ref 4.8–10.8)
WBC # FLD AUTO: 13.25 K/UL — HIGH (ref 4.8–10.8)

## 2018-07-26 RX ORDER — CEFTRIAXONE 500 MG/1
1 INJECTION, POWDER, FOR SOLUTION INTRAMUSCULAR; INTRAVENOUS ONCE
Qty: 0 | Refills: 0 | Status: COMPLETED | OUTPATIENT
Start: 2018-07-26 | End: 2018-07-26

## 2018-07-26 RX ORDER — KETOROLAC TROMETHAMINE 30 MG/ML
15 SYRINGE (ML) INJECTION ONCE
Qty: 0 | Refills: 0 | Status: DISCONTINUED | OUTPATIENT
Start: 2018-07-26 | End: 2018-07-26

## 2018-07-26 RX ORDER — SODIUM CHLORIDE 9 MG/ML
1000 INJECTION, SOLUTION INTRAVENOUS ONCE
Qty: 0 | Refills: 0 | Status: COMPLETED | OUTPATIENT
Start: 2018-07-26 | End: 2018-07-26

## 2018-07-26 RX ORDER — ONDANSETRON 8 MG/1
4 TABLET, FILM COATED ORAL ONCE
Qty: 0 | Refills: 0 | Status: COMPLETED | OUTPATIENT
Start: 2018-07-26 | End: 2018-07-26

## 2018-07-26 RX ORDER — IOHEXOL 300 MG/ML
30 INJECTION, SOLUTION INTRAVENOUS ONCE
Qty: 0 | Refills: 0 | Status: COMPLETED | OUTPATIENT
Start: 2018-07-26 | End: 2018-07-26

## 2018-07-26 RX ORDER — SODIUM CHLORIDE 9 MG/ML
1000 INJECTION INTRAMUSCULAR; INTRAVENOUS; SUBCUTANEOUS ONCE
Qty: 0 | Refills: 0 | Status: COMPLETED | OUTPATIENT
Start: 2018-07-26 | End: 2018-07-26

## 2018-07-26 RX ORDER — IOHEXOL 300 MG/ML
30 INJECTION, SOLUTION INTRAVENOUS ONCE
Qty: 0 | Refills: 0 | Status: DISCONTINUED | OUTPATIENT
Start: 2018-07-26 | End: 2018-07-26

## 2018-07-26 RX ORDER — MORPHINE SULFATE 50 MG/1
8 CAPSULE, EXTENDED RELEASE ORAL ONCE
Qty: 0 | Refills: 0 | Status: DISCONTINUED | OUTPATIENT
Start: 2018-07-26 | End: 2018-07-26

## 2018-07-26 RX ADMIN — SODIUM CHLORIDE 1000 MILLILITER(S): 9 INJECTION, SOLUTION INTRAVENOUS at 19:20

## 2018-07-26 RX ADMIN — SODIUM CHLORIDE 1000 MILLILITER(S): 9 INJECTION, SOLUTION INTRAVENOUS at 22:37

## 2018-07-26 RX ADMIN — CEFTRIAXONE 100 GRAM(S): 500 INJECTION, POWDER, FOR SOLUTION INTRAMUSCULAR; INTRAVENOUS at 17:50

## 2018-07-26 RX ADMIN — SODIUM CHLORIDE 1000 MILLILITER(S): 9 INJECTION, SOLUTION INTRAVENOUS at 20:42

## 2018-07-26 RX ADMIN — SODIUM CHLORIDE 1000 MILLILITER(S): 9 INJECTION, SOLUTION INTRAVENOUS at 22:22

## 2018-07-26 RX ADMIN — Medication 15 MILLIGRAM(S): at 20:42

## 2018-07-26 RX ADMIN — Medication 15 MILLIGRAM(S): at 22:59

## 2018-07-26 RX ADMIN — SODIUM CHLORIDE 1000 MILLILITER(S): 9 INJECTION INTRAMUSCULAR; INTRAVENOUS; SUBCUTANEOUS at 19:14

## 2018-07-26 RX ADMIN — SODIUM CHLORIDE 2000 MILLILITER(S): 9 INJECTION INTRAMUSCULAR; INTRAVENOUS; SUBCUTANEOUS at 15:00

## 2018-07-26 RX ADMIN — CEFTRIAXONE 1 GRAM(S): 500 INJECTION, POWDER, FOR SOLUTION INTRAMUSCULAR; INTRAVENOUS at 19:14

## 2018-07-26 NOTE — ED PROVIDER NOTE - NS ED ROS FT
Constitutional: No fever  ENMT:  No neck pain  Cardiac:  No chest pain  Respiratory:  No cough, SOB  GI:  No nausea, vomiting, diarrhea. +abdominal pain, constipation  :  +suprapubic cath  MS:  No back pain  Neuro:  No headache or lightheadedness  Skin:  No skin rash  Endocrine: h/o DM

## 2018-07-26 NOTE — ED PROVIDER NOTE - PHYSICAL EXAMINATION
Vital signs reviewed  GENERAL: Patient nontoxic appearing, NAD  HEAD: NCAT  EYES: Anicteric  ENT: Dry MM  NECK: Supple, non tender  RESPIRATORY: Normal respiratory effort. CTA B/L  CARDIOVASCULAR: Regular rate and rhythm. No murmurs, rubs or gallops.  ABDOMEN: Soft. Nondistended. Diffuse moderate abd tenderness without guarding or rebound  MUSCULOSKELETAL/EXTREMITIES: Brisk cap refill. 2+ radial pulses. Atrophied lower extremities.   SKIN:  Warm and dry. B/L heel ulcers and large sacral ulcer, healing, no surrounding erythema or purulent discharge. Suprapubic cath site clean, no erythema or discharge.   NEURO: AAOx3. B/L lower extremity weakness (chronic). Speech clear and coherent.   PSYCHIATRIC: Cooperative. Affect appropriate

## 2018-07-26 NOTE — ED ADULT NURSE NOTE - OBJECTIVE STATEMENT
Pt c/o generalized abd pain, weight loss 180lbs to 150lbs the past 1 month and 5 stage IV pressure ulcers to sacrum, b/l buttock, and b/l heels. Pt also has suprapubic cath in place. Denies n/v/ or any other symptoms.

## 2018-07-26 NOTE — ED ADULT NURSE NOTE - CAS EDN DISCHARGE ASSESSMENT
Patient baseline mental status/Dressing clean and dry/Alert and oriented to person, place and time/Awake

## 2018-07-26 NOTE — ED PROVIDER NOTE - MEDICAL DECISION MAKING DETAILS
I personally evaluated the patient. I reviewed the Resident’s or Physician Assistant’s note (as assigned above), and agree with the findings and plan except as documented in my note.    patient admitted for further management and care. Patient with possible partial SBO vs ileus. Surgery on the case. Patient admitted in stable condition.

## 2018-07-26 NOTE — ED PROVIDER NOTE - ATTENDING CONTRIBUTION TO CARE
I personally evaluated the patient. I reviewed the Resident’s or Physician Assistant’s note (as assigned above), and agree with the findings and plan except as documented in my note.    58 y/o M with hx of lumbar compression fx, DM, suprapubic catheter presents with lower abd pain for the past few days. Patient denies any fevers, chills, n/v. No CP, SOB. I personally evaluated the patient. I reviewed the Resident’s or Physician Assistant’s note (as assigned above), and agree with the findings and plan except as documented in my note.    58 y/o M with hx of lumbar compression fx with LE weakness, DM, suprapubic catheter presents with lower abd pain for the past few days. Patient denies any fevers, chills, n/v. No CP, SOB.    CONSTITUTIONAL: Well-developed; well-nourished; in no acute distress. Sitting up and providing appropriate history and physical examination  SKIN: skin exam is warm and dry, no acute rash.  HEAD: Normocephalic; atraumatic.  EYES: PERRL, 3 mm bilateral, no nystagmus, EOM intact; conjunctiva and sclera clear.  ENT: No nasal discharge; airway clear.  NECK: Supple; non tender.+ full passive ROM in all directions. No JVD  CARD: S1, S2 normal; no murmurs, gallops, or rubs. Regular rate and rhythm. + Symmetric Strong Pulses  RESP: No wheezes, rales or rhonchi. Good air movement bilaterally  ABD: mild ttp diffusely.   EXT: Normal ROM. 2/5 LE weakness- baselines. Otherwise neuro exam unremakeable    Plan: IVF, labs, CT abd/pelvis, reassess

## 2018-07-26 NOTE — ED PROVIDER NOTE - OBJECTIVE STATEMENT
56yo M with PMHx DM, B/L LE weakness 2/2 previous lumbar trauma (able to stand but cannot walk), sacral decubiti and heel ulcers, suprapubic cath, p/w diffuse abdominal pain since yesterday. Pt unable to tolerate PO or take meds 2/2 pain. Pt states on 320mg methadone and 240mg oxycontin daily. Took total of 8 percocets in past 12 hours. Denies fever, headache, lightheadedness, CP, SOB, cough, nausea, vomiting, diarrhea, abd pain. Pt had small BM in ED, no blood or melena. 58yo M with PMHx DM, B/L LE weakness 2/2 previous lumbar trauma (able to stand but cannot walk), sacral decubiti and heel ulcers, suprapubic cath, p/w diffuse abdominal pain since yesterday. Pt unable to tolerate PO or take meds 2/2 pain. Pt states on 320mg methadone and 240mg oxycontin daily. Took total of 8 percocets in past 12 hours. Denies fever, headache, lightheadedness, CP, SOB, cough, nausea, vomiting, diarrhea, abd pain. Pt had BM yesterday as well as small BM in ED, no blood or melena.

## 2018-07-26 NOTE — ED ADULT NURSE REASSESSMENT NOTE - NS ED NURSE REASSESS COMMENT FT1
Pt hypotensive but asymptomatic. MD aware. Awaiting for CT result. IV abx given per MD order. Family at bedside. Will cont to monitor.
Pt refused oral contrast despite education. MD stated he will follow up with pt. Will cont to monitor
Pt assessed. Awaiting for surgery team. t&p q2hrs. Family at bedside. Pt refused pressure ulcers dsg change at this time despite education, stating he changed it today. Toradol given for pain management. No concerns at this time. Will cont to monitor.

## 2018-07-26 NOTE — ED PROVIDER NOTE - CARE PLAN
Principal Discharge DX:	Sepsis  Secondary Diagnosis:	UTI (urinary tract infection) Principal Discharge DX:	Abdominal pain  Secondary Diagnosis:	UTI (urinary tract infection)  Secondary Diagnosis:	Sacral decubitus ulcer

## 2018-07-26 NOTE — ED PROVIDER NOTE - PROGRESS NOTE DETAILS
Spoke with surg resident Arvin who came to see patient, recommending CT with oral contrast Spoke with burn PA. Will f/u in am s/o to Dr. Abreu. f/u CT with PO contrast and surgery. Hypotensive, reassess after IVF. s/o to Dr. Abreu. f/u CT with PO contrast and surgery c/s. Pt with UTI, has received ABx and IVF. Continue fluids and reassess BP. pt unable to tolerate NG tube, surgery aware. Patient is not an ICU candidate per ICU fellow at this time given stabilized BP. ICU will follow the case.

## 2018-07-26 NOTE — ED ADULT NURSE NOTE - CHPI ED SYMPTOMS NEG
no diarrhea/no fever/no abdominal distension/no vomiting/no chills/no hematuria/no nausea/no blood in stool

## 2018-07-27 DIAGNOSIS — Z98.890 OTHER SPECIFIED POSTPROCEDURAL STATES: Chronic | ICD-10-CM

## 2018-07-27 DIAGNOSIS — Z89.421 ACQUIRED ABSENCE OF OTHER RIGHT TOE(S): Chronic | ICD-10-CM

## 2018-07-27 LAB
ALBUMIN SERPL ELPH-MCNC: 1.6 G/DL — LOW (ref 3.5–5.2)
ALP SERPL-CCNC: 102 U/L — SIGNIFICANT CHANGE UP (ref 30–115)
ALT FLD-CCNC: 6 U/L — SIGNIFICANT CHANGE UP (ref 0–41)
ANION GAP SERPL CALC-SCNC: 12 MMOL/L — SIGNIFICANT CHANGE UP (ref 7–14)
APTT BLD: 32 SEC — SIGNIFICANT CHANGE UP (ref 27–39.2)
AST SERPL-CCNC: 11 U/L — SIGNIFICANT CHANGE UP (ref 0–41)
BASOPHILS # BLD AUTO: 0.04 K/UL — SIGNIFICANT CHANGE UP (ref 0–0.2)
BASOPHILS NFR BLD AUTO: 0.5 % — SIGNIFICANT CHANGE UP (ref 0–1)
BILIRUB SERPL-MCNC: <0.2 MG/DL — SIGNIFICANT CHANGE UP (ref 0.2–1.2)
BUN SERPL-MCNC: 21 MG/DL — HIGH (ref 10–20)
CALCIUM SERPL-MCNC: 7.8 MG/DL — LOW (ref 8.5–10.1)
CHLORIDE SERPL-SCNC: 98 MMOL/L — SIGNIFICANT CHANGE UP (ref 98–110)
CO2 SERPL-SCNC: 23 MMOL/L — SIGNIFICANT CHANGE UP (ref 17–32)
CREAT SERPL-MCNC: 0.7 MG/DL — SIGNIFICANT CHANGE UP (ref 0.7–1.5)
CULTURE RESULTS: SIGNIFICANT CHANGE UP
EOSINOPHIL # BLD AUTO: 0.06 K/UL — SIGNIFICANT CHANGE UP (ref 0–0.7)
EOSINOPHIL NFR BLD AUTO: 0.8 % — SIGNIFICANT CHANGE UP (ref 0–8)
GLUCOSE SERPL-MCNC: 78 MG/DL — SIGNIFICANT CHANGE UP (ref 70–99)
HCT VFR BLD CALC: 28.7 % — LOW (ref 42–52)
HGB BLD-MCNC: 8.9 G/DL — LOW (ref 14–18)
IMM GRANULOCYTES NFR BLD AUTO: 0.4 % — HIGH (ref 0.1–0.3)
INR BLD: 2.32 RATIO — HIGH (ref 0.65–1.3)
LACTATE SERPL-SCNC: 0.8 MMOL/L — SIGNIFICANT CHANGE UP (ref 0.5–2.2)
LACTATE SERPL-SCNC: 1.5 MMOL/L — SIGNIFICANT CHANGE UP (ref 0.5–2.2)
LDH SERPL L TO P-CCNC: 133 U/L — SIGNIFICANT CHANGE UP (ref 50–242)
LYMPHOCYTES # BLD AUTO: 1.81 K/UL — SIGNIFICANT CHANGE UP (ref 1.2–3.4)
LYMPHOCYTES # BLD AUTO: 23.7 % — SIGNIFICANT CHANGE UP (ref 20.5–51.1)
MAGNESIUM SERPL-MCNC: 1.2 MG/DL — LOW (ref 1.8–2.4)
MCHC RBC-ENTMCNC: 24.1 PG — LOW (ref 27–31)
MCHC RBC-ENTMCNC: 31 G/DL — LOW (ref 32–37)
MCV RBC AUTO: 77.8 FL — LOW (ref 80–94)
MONOCYTES # BLD AUTO: 0.73 K/UL — HIGH (ref 0.1–0.6)
MONOCYTES NFR BLD AUTO: 9.6 % — HIGH (ref 1.7–9.3)
NEUTROPHILS # BLD AUTO: 4.97 K/UL — SIGNIFICANT CHANGE UP (ref 1.4–6.5)
NEUTROPHILS NFR BLD AUTO: 65 % — SIGNIFICANT CHANGE UP (ref 42.2–75.2)
NRBC # BLD: 0 /100 WBCS — SIGNIFICANT CHANGE UP (ref 0–0)
PHOSPHATE SERPL-MCNC: 2.7 MG/DL — SIGNIFICANT CHANGE UP (ref 2.1–4.9)
PLATELET # BLD AUTO: 364 K/UL — SIGNIFICANT CHANGE UP (ref 130–400)
POTASSIUM SERPL-MCNC: 4.1 MMOL/L — SIGNIFICANT CHANGE UP (ref 3.5–5)
POTASSIUM SERPL-SCNC: 4.1 MMOL/L — SIGNIFICANT CHANGE UP (ref 3.5–5)
PROT SERPL-MCNC: 4.9 G/DL — LOW (ref 6–8)
PROTHROM AB SERPL-ACNC: 24.8 SEC — HIGH (ref 9.95–12.87)
RBC # BLD: 3.69 M/UL — LOW (ref 4.7–6.1)
RBC # FLD: 15.9 % — HIGH (ref 11.5–14.5)
SODIUM SERPL-SCNC: 133 MMOL/L — LOW (ref 135–146)
SPECIMEN SOURCE: SIGNIFICANT CHANGE UP
WBC # BLD: 7.64 K/UL — SIGNIFICANT CHANGE UP (ref 4.8–10.8)
WBC # FLD AUTO: 7.64 K/UL — SIGNIFICANT CHANGE UP (ref 4.8–10.8)

## 2018-07-27 RX ORDER — LISINOPRIL 2.5 MG/1
20 TABLET ORAL DAILY
Qty: 0 | Refills: 0 | Status: DISCONTINUED | OUTPATIENT
Start: 2018-07-27 | End: 2018-07-27

## 2018-07-27 RX ORDER — OXYBUTYNIN CHLORIDE 5 MG
10 TABLET ORAL DAILY
Qty: 0 | Refills: 0 | Status: DISCONTINUED | OUTPATIENT
Start: 2018-07-27 | End: 2018-07-28

## 2018-07-27 RX ORDER — SODIUM CHLORIDE 9 MG/ML
1000 INJECTION INTRAMUSCULAR; INTRAVENOUS; SUBCUTANEOUS
Qty: 0 | Refills: 0 | Status: DISCONTINUED | OUTPATIENT
Start: 2018-07-27 | End: 2018-07-27

## 2018-07-27 RX ORDER — OXYCODONE HYDROCHLORIDE 5 MG/1
30 TABLET ORAL
Qty: 0 | Refills: 0 | Status: DISCONTINUED | OUTPATIENT
Start: 2018-07-27 | End: 2018-08-03

## 2018-07-27 RX ORDER — HYDROMORPHONE HYDROCHLORIDE 2 MG/ML
0.5 INJECTION INTRAMUSCULAR; INTRAVENOUS; SUBCUTANEOUS ONCE
Qty: 0 | Refills: 0 | Status: DISCONTINUED | OUTPATIENT
Start: 2018-07-27 | End: 2018-07-27

## 2018-07-27 RX ORDER — VANCOMYCIN HCL 1 G
1000 VIAL (EA) INTRAVENOUS ONCE
Qty: 0 | Refills: 0 | Status: COMPLETED | OUTPATIENT
Start: 2018-07-27 | End: 2018-07-27

## 2018-07-27 RX ORDER — HYDROMORPHONE HYDROCHLORIDE 2 MG/ML
1 INJECTION INTRAMUSCULAR; INTRAVENOUS; SUBCUTANEOUS EVERY 4 HOURS
Qty: 0 | Refills: 0 | Status: DISCONTINUED | OUTPATIENT
Start: 2018-07-27 | End: 2018-07-29

## 2018-07-27 RX ORDER — MEROPENEM 1 G/30ML
1000 INJECTION INTRAVENOUS ONCE
Qty: 0 | Refills: 0 | Status: COMPLETED | OUTPATIENT
Start: 2018-07-27 | End: 2018-07-27

## 2018-07-27 RX ORDER — SODIUM CHLORIDE 9 MG/ML
500 INJECTION INTRAMUSCULAR; INTRAVENOUS; SUBCUTANEOUS ONCE
Qty: 0 | Refills: 0 | Status: COMPLETED | OUTPATIENT
Start: 2018-07-27 | End: 2018-07-27

## 2018-07-27 RX ORDER — MEROPENEM 1 G/30ML
1000 INJECTION INTRAVENOUS EVERY 8 HOURS
Qty: 0 | Refills: 0 | Status: DISCONTINUED | OUTPATIENT
Start: 2018-07-27 | End: 2018-07-28

## 2018-07-27 RX ORDER — GABAPENTIN 400 MG/1
800 CAPSULE ORAL THREE TIMES A DAY
Qty: 0 | Refills: 0 | Status: DISCONTINUED | OUTPATIENT
Start: 2018-07-27 | End: 2018-08-05

## 2018-07-27 RX ORDER — DIAZEPAM 5 MG
10 TABLET ORAL
Qty: 0 | Refills: 0 | Status: DISCONTINUED | OUTPATIENT
Start: 2018-07-27 | End: 2018-08-03

## 2018-07-27 RX ORDER — SODIUM CHLORIDE 9 MG/ML
1000 INJECTION INTRAMUSCULAR; INTRAVENOUS; SUBCUTANEOUS
Qty: 0 | Refills: 0 | Status: DISCONTINUED | OUTPATIENT
Start: 2018-07-27 | End: 2018-07-28

## 2018-07-27 RX ORDER — MEROPENEM 1 G/30ML
INJECTION INTRAVENOUS
Qty: 0 | Refills: 0 | Status: DISCONTINUED | OUTPATIENT
Start: 2018-07-27 | End: 2018-07-28

## 2018-07-27 RX ORDER — NOREPINEPHRINE BITARTRATE/D5W 8 MG/250ML
0.05 PLASTIC BAG, INJECTION (ML) INTRAVENOUS
Qty: 8 | Refills: 0 | Status: DISCONTINUED | OUTPATIENT
Start: 2018-07-27 | End: 2018-07-27

## 2018-07-27 RX ORDER — METHADONE HYDROCHLORIDE 40 MG/1
80 TABLET ORAL
Qty: 0 | Refills: 0 | Status: DISCONTINUED | OUTPATIENT
Start: 2018-07-27 | End: 2018-08-03

## 2018-07-27 RX ORDER — VANCOMYCIN HCL 1 G
1000 VIAL (EA) INTRAVENOUS EVERY 12 HOURS
Qty: 0 | Refills: 0 | Status: DISCONTINUED | OUTPATIENT
Start: 2018-07-27 | End: 2018-07-28

## 2018-07-27 RX ADMIN — SODIUM CHLORIDE 125 MILLILITER(S): 9 INJECTION INTRAMUSCULAR; INTRAVENOUS; SUBCUTANEOUS at 12:55

## 2018-07-27 RX ADMIN — GABAPENTIN 800 MILLIGRAM(S): 400 CAPSULE ORAL at 06:54

## 2018-07-27 RX ADMIN — Medication 10 MILLIGRAM(S): at 11:37

## 2018-07-27 RX ADMIN — Medication 250 MILLIGRAM(S): at 02:02

## 2018-07-27 RX ADMIN — SODIUM CHLORIDE 1000 MILLILITER(S): 9 INJECTION INTRAMUSCULAR; INTRAVENOUS; SUBCUTANEOUS at 00:52

## 2018-07-27 RX ADMIN — Medication 250 MILLIGRAM(S): at 06:52

## 2018-07-27 RX ADMIN — OXYCODONE HYDROCHLORIDE 30 MILLIGRAM(S): 5 TABLET ORAL at 12:10

## 2018-07-27 RX ADMIN — MEROPENEM 100 MILLIGRAM(S): 1 INJECTION INTRAVENOUS at 13:55

## 2018-07-27 RX ADMIN — SODIUM CHLORIDE 75 MILLILITER(S): 9 INJECTION INTRAMUSCULAR; INTRAVENOUS; SUBCUTANEOUS at 02:41

## 2018-07-27 RX ADMIN — OXYCODONE HYDROCHLORIDE 30 MILLIGRAM(S): 5 TABLET ORAL at 11:37

## 2018-07-27 RX ADMIN — GABAPENTIN 800 MILLIGRAM(S): 400 CAPSULE ORAL at 13:55

## 2018-07-27 RX ADMIN — Medication 250 MILLIGRAM(S): at 18:18

## 2018-07-27 RX ADMIN — MEROPENEM 100 MILLIGRAM(S): 1 INJECTION INTRAVENOUS at 01:25

## 2018-07-27 RX ADMIN — MEROPENEM 100 MILLIGRAM(S): 1 INJECTION INTRAVENOUS at 06:51

## 2018-07-27 RX ADMIN — Medication 10 MILLIGRAM(S): at 07:38

## 2018-07-27 RX ADMIN — OXYCODONE HYDROCHLORIDE 30 MILLIGRAM(S): 5 TABLET ORAL at 07:38

## 2018-07-27 RX ADMIN — OXYCODONE HYDROCHLORIDE 30 MILLIGRAM(S): 5 TABLET ORAL at 18:15

## 2018-07-27 RX ADMIN — METHADONE HYDROCHLORIDE 80 MILLIGRAM(S): 40 TABLET ORAL at 18:16

## 2018-07-27 RX ADMIN — METHADONE HYDROCHLORIDE 80 MILLIGRAM(S): 40 TABLET ORAL at 11:38

## 2018-07-27 RX ADMIN — METHADONE HYDROCHLORIDE 80 MILLIGRAM(S): 40 TABLET ORAL at 07:37

## 2018-07-27 RX ADMIN — GABAPENTIN 800 MILLIGRAM(S): 400 CAPSULE ORAL at 21:46

## 2018-07-27 RX ADMIN — Medication 10 MILLIGRAM(S): at 18:15

## 2018-07-27 RX ADMIN — MEROPENEM 100 MILLIGRAM(S): 1 INJECTION INTRAVENOUS at 21:46

## 2018-07-27 RX ADMIN — HYDROMORPHONE HYDROCHLORIDE 0.5 MILLIGRAM(S): 2 INJECTION INTRAMUSCULAR; INTRAVENOUS; SUBCUTANEOUS at 03:20

## 2018-07-27 NOTE — H&P ADULT - NSHPLABSRESULTS_GEN_ALL_CORE
9.9    13.25 )-----------( 514      ( 26 Jul 2018 14:52 )             31.4       07-26    138  |  95<L>  |  34<H>  ----------------------------<  99  4.7   |  26  |  1.3    Ca    8.3<L>      26 Jul 2018 14:52    TPro  6.1  /  Alb  2.3<L>  /  TBili  <0.2  /  DBili  x   /  AST  13  /  ALT  8   /  AlkPhos  131<H>  07-26              Urinalysis Basic - ( 26 Jul 2018 14:52 )    Color: Other / Appearance: Turbid / SG: >=1.030 / pH: x  Gluc: x / Ketone: Negative  / Bili: Small / Urobili: 1.0   Blood: x / Protein: >=300 / Nitrite: Positive   Leuk Esterase: Moderate / RBC: 2-5 /HPF / WBC 6-10 /HPF   Sq Epi: x / Non Sq Epi: x / Bacteria: See Note        PT/INR - ( 26 Jul 2018 14:52 )   PT: 25.20 sec;   INR: 2.35 ratio         PTT - ( 26 Jul 2018 14:52 )  PTT:36.8 sec    Lactate Trend  07-26 @ 14:52 Lactate:1.7       < from: CT Angio Abdomen and Pelvis w/ IV Cont (07.26.18 @ 18:27) >    FINDINGS:     VASCULATURE: No evidence of abdominal aortic aneurysm. There are vascular   calcifications of the aorta and its branches. The SMA, celiac axis   demonstrating patent ostium. Bilateral renal arteries demonstrate patent   ostia. The ROJAS demonstrates patent ostium.    HEPATOBILIARY:  Hydropic gallbladder.    SPLEEN: Unremarkable.    PANCREAS: Unremarkable.    ADRENAL GLANDS: Unremarkable.    KIDNEYS: Symmetric enhancement bilaterally. No hydronephrosis. Multiple   bilateral hypodensities measuring up to 1.6 cm, statistically likely   representing cysts.    ABDOMINOPELVIC NODES: Unremarkable.    PELVIC ORGANS: Suprapubic catheter. 6 mm stone within the urinary bladder   near the left UVJ.    PERITONEUM /MESENTERY/BOWEL: There isdiffuse dilatation of the small   bowel loops measuring up to 3.6 cm, with suggestion of relatively   collapsed right lower quadrant small bowel loops. Although no   transitional point is definitively identified, evaluation is limited   secondary to the lack of oral contrast administration. Differential   includes ileus or early partial small bowel obstruction.    Small abdominopelvic ascites.    BONES/SOFT TISSUES: Again noted sacral decubitus ulcer with direct   extension to sacrum, left femur,and bilateral posterior iliac bones into   the inferior pubic ramus, consistent with osteomyelitis. Stable   thoracolumbar Mina rods incompletely imaged. Stable chronic L1   moderate compression fracture deformity.    IMPRESSION:    Atherosclerotic disease of the aorta and its branches, as above with   patent SMA, celiac axis, ROJAS and bilateral renal artery ostia.    Diffuse dilatation of the small bowel loops up to 3.6 cm, with suggestion   of relative collapse of the right lower quadrant small bowel loops.   Although no transitional point is definitively identified, evaluation is   limited secondary to the lack of oral contrast administration.   Differential includes ileus and early partial small bowel obstruction. If   clinically warranted, repeat CT abdomen pelvis with oral and intravenous   contrast administration or abdominal KUB can be obtained after   administration of oral contrast.    Small abdominopelvic ascites.    < end of copied text >          CAPILLARY BLOOD GLUCOSE

## 2018-07-27 NOTE — H&P ADULT - HISTORY OF PRESENT ILLNESS
58 yo M with PMHx DM, B/L LE weakness 2/2 previous lumbar trauma (able to stand but cannot walk), subsequent sacral decubiti and heel ulcers, suprapubic cath, p/w diffuse abdominal pain since for 1 day. His pain is 7/10 and usually takes high doses of pain medications but has since not taken most of them due to poor tolerance of PO intake. He lost 2 Kg weight since March, has no nausea, vomiting or diarrhea. Pt had BM on the day before admission as well as small BM in ED, no blood or melena. Pt unable to tolerate PO or take meds 2/2 pain. Pt states on 320mg methadone and 240mg oxycontin daily. Took total of 8 percocet tablets in past 12 hours. Denies fever, headache, lightheadedness, CP, SOB, cough.

## 2018-07-27 NOTE — CONSULT NOTE ADULT - SUBJECTIVE AND OBJECTIVE BOX
MARGE BELLA [id number]  57y Male    HPI:  58 yo male paraplegic with mutiple surgeries for his decub ulcers on high dose narcotics for chronic pain comes in with addominal pain diffuse, constant, sharp, no n/v/d/c last bm today passing flatus. sent in by his pmd due to hypotension at home. + suprapubic cath with uti. responded to fluids. did not tolerate ngt or po contrast.    Pt states on 320mg methadone and 240mg oxycontin daily    PAST MEDICAL & SURGICAL HISTORY:  Suprapubic catheter  Pressure ulcer  Diabetes  Lumbar compression fracture  H/O hernia repair        MEDICATIONS  (STANDING):  meropenem  IVPB 1000 milliGRAM(s) IV Intermittent once  meropenem  IVPB 1000 milliGRAM(s) IV Intermittent every 8 hours  meropenem  IVPB      norepinephrine Infusion 0.05 MICROgram(s)/kG/Min (6.375 mL/Hr) IV Continuous <Continuous>  vancomycin  IVPB 1000 milliGRAM(s) IV Intermittent once    MEDICATIONS  (PRN):      Allergies    sulfamethizole (Unknown)    Intolerances        REVIEW OF SYSTEMS    [ ] A ten-point review of systems was otherwise negative except as noted.      Vital Signs Last 24 Hrs  T(C): 36.8 (27 Jul 2018 00:42), Max: 37 (26 Jul 2018 16:23)  T(F): 98.3 (27 Jul 2018 00:42), Max: 98.6 (26 Jul 2018 16:23)  HR: 91 (27 Jul 2018 00:42) (57 - 98)  BP: 77/50 (27 Jul 2018 00:42) (70/52 - 105/54)  BP(mean): --  RR: 95 (27 Jul 2018 00:42) (18 - 95)  SpO2: 100% (26 Jul 2018 15:27) (98% - 100%)    PHYSICAL EXAM:  GENERAL: NAD, well-appearing  CHEST/LUNG: Clear to auscultation bilaterally  HEART: Regular rate and rhythm  ABDOMEN: Soft, tender diffusely, Nondistended;   EXTREMITIES:  No clubbing, cyanosis, or edema multiple well healling ulcers       LABS:  Labs:  CAPILLARY BLOOD GLUCOSE                              9.9    13.25 )-----------( 514      ( 26 Jul 2018 14:52 )             31.4       Auto Neutrophil %: 77.0 % (07-26-18 @ 14:52)  Auto Immature Granulocyte %: 0.8 % (07-26-18 @ 14:52)    07-26    138  |  95<L>  |  34<H>  ----------------------------<  99  4.7   |  26  |  1.3      Calcium, Total Serum: 8.3 mg/dL (07-26-18 @ 14:52)      LFTs:             6.1  | <0.2 | 13       ------------------[131     ( 26 Jul 2018 14:52 )  2.3  | x    | 8           Lipase:9      Amylase:x         Lactate, Blood: 1.7 mmol/L (07-26-18 @ 14:52)      Coags:     25.20  ----< 2.35    ( 26 Jul 2018 14:52 )     36.8            Urinalysis Basic - ( 26 Jul 2018 14:52 )    Color: Other / Appearance: Turbid / SG: >=1.030 / pH: x  Gluc: x / Ketone: Negative  / Bili: Small / Urobili: 1.0   Blood: x / Protein: >=300 / Nitrite: Positive   Leuk Esterase: Moderate / RBC: 2-5 /HPF / WBC 6-10 /HPF   Sq Epi: x / Non Sq Epi: x / Bacteria: See Note            RADIOLOGY & ADDITIONAL STUDIES:    < from: CT Angio Abdomen and Pelvis w/ IV Cont (07.26.18 @ 18:27) >  IMPRESSION:    Atherosclerotic disease of the aorta and its branches, as above with   patent SMA, celiac axis, ROJAS and bilateral renal artery ostia.    Diffuse dilatation of the small bowel loops up to 3.6 cm, with suggestion   of relative collapse of the right lower quadrant small bowel loops.   Although no transitional point is definitively identified, evaluation is   limited secondary to the lack of oral contrast administration.   Differential includes ileus and early partial small bowel obstruction. If   clinically warranted, repeat CT abdomen pelvis with oral and intravenous   contrast administration or abdominal KUB can be obtained after   administration of oral contrast.    Small abdominopelvic ascites.    < end of copied text >

## 2018-07-27 NOTE — CHART NOTE - NSCHARTNOTEFT_GEN_A_CORE
Patient seen and examined by Trauma team.  Labs and imaging were reviewed by team.  Patient is nontender on exam, having bowel movements, no surgical intervention at this time.  Recall ANGELICA Polanco MD  PGY 1, General Surgery  [phone]

## 2018-07-27 NOTE — CONSULT NOTE ADULT - SUBJECTIVE AND OBJECTIVE BOX
MARGE BELLA 57yMalePatient is a 57y old  Male who presents with a chief complaint of Abdominal pain (27 Jul 2018 01:10)      Patient has history of:  sulfamethizole (Unknown)      ABDOMINAL PAIN;UTI (URINARY TRACT INFECTION);SACRAL DECUBITUS ULCER  ^ABDOMINAL PAIN;UTI (URINARY TRACT INFECTION);SACRAL DECUBITUS ULCER  Handoff  MEWS Score  Suprapubic catheter  Pressure ulcer  Diabetes  Lumbar compression fracture  Abdominal pain  Sepsis  S/P debridement  Toe amputation status, right  H/O hernia repair  No significant past surgical history  ABD PAIN/INFECTION  90+  Sacral decubitus ulcer  UTI (urinary tract infection)        Patient treated with:  meropenem  IVPB      meropenem  IVPB 1000 milliGRAM(s) IV Intermittent every 8 hours  vancomycin  IVPB 1000 milliGRAM(s) IV Intermittent every 12 hours        PHYSICAL EXAM  T(F): 98 (07-27-18 @ 06:41), Max: 98.6 (07-26-18 @ 16:23)  HR: 87 (07-27-18 @ 06:41) (57 - 98)  BP: 100/62 (07-27-18 @ 06:41) (70/52 - 113/62)  RR: 16 (07-27-18 @ 06:41) (16 - 95)  SpO2: 98% (07-27-18 @ 06:41) (98% - 100%)  Daily Height in cm: 185.42 (27 Jul 2018 03:57)    Daily   HEENT: normal, no nuchal rigidity  Cor: RSR Nl S1 S2  Lungs: clear  Decreased breath sounds at bases    Abdomen: Nontender, Nl BS,     Ext: No clubbing,cyanosis or edema    LAB & RADIOLOGIC RESULTS:                        9.9    13.25 )-----------( 514      ( 26 Jul 2018 14:52 )             31.4         07-26    138  |  95<L>  |  34<H>  ----------------------------<  99  4.7   |  26  |  1.3      TPro  6.1  /  Alb  2.3<L>  /  TBili  <0.2  /  DBili  x   /  AST  13  /  ALT  8   /  AlkPhos  131<H>  07-26       Creatinine, Serum: 1.3 mg/dL (07-26-18 @ 14:52)  eGFR if Non African American: 61 mL/min/1.73M2 (07-26-18 @ 14:52)  eGFR if : 71 mL/min/1.73M2 (07-26-18 @ 14:52)        Urinalysis Basic - ( 26 Jul 2018 14:52 )    Color: Other / Appearance: Turbid / SG: >=1.030 / pH: x  Gluc: x / Ketone: Negative  / Bili: Small / Urobili: 1.0   Blood: x / Protein: >=300 / Nitrite: Positive   Leuk Esterase: Moderate / RBC: 2-5 /HPF / WBC 6-10 /HPF   Sq Epi: x / Non Sq Epi: x / Bacteria: See Note      PT/INR - ( 26 Jul 2018 14:52 )   PT: 25.20 sec;   INR: 2.35 ratio         PTT - ( 26 Jul 2018 14:52 )  PTT:36.8 sec

## 2018-07-27 NOTE — CONSULT NOTE ADULT - ASSESSMENT
Assessment:  shock likely due to hypovolemia as patient responded to fluids and is no longer requiring pressors  Ileus possibly due to dehydration vs medication side effect, mechanical obstruction is less likely. Improved with fluids.  UTI  Chronic suprapubic catheter  paraplegia with decubitus ulcers    PLAN:    CNS: avoid CNS depressants other patient's baseline opiate medications    HEENT: Oral care    PULMONARY:  HOB @ 45 degrees    CARDIOVASCULAR: keep I> O for now    GI: GI prophylaxis.  NPO follow up with surgical team    RENAL:  Follow up lytes.  Correct as needed    INFECTIOUS DISEASE: Follow up cultures, continue antibiotics for now    HEMATOLOGICAL:  DVT prophylaxis.    ENDOCRINE:  Follow up FS.  Insulin protocol if needed.    MUSCULOSKELETAL:  OOB as tolerated      Dispo: no need for ICU care at this time as patient is hemodynamically stable

## 2018-07-27 NOTE — PROGRESS NOTE ADULT - SUBJECTIVE AND OBJECTIVE BOX
PM rounds  HPI:  56 yo M with PMHx DM, B/L LE weakness 2/2 previous lumbar trauma (able to stand but cannot walk), subsequent sacral decubiti and heel ulcers, suprapubic cath, p/w diffuse abdominal pain since for 1 day. His pain is 7/10 and usually takes high doses of pain medications but has since not taken most of them due to poor tolerance of PO intake. He lost 2 Kg weight since March, has no nausea, vomiting or diarrhea. Pt had BM on the day before admission as well as small BM in ED, no blood or melena. Pt unable to tolerate PO or take meds 2/2 pain. Pt states on 320mg methadone and 240mg oxycontin daily. Took total of 8 percocet tablets in past 12 hours. Denies fever, headache, lightheadedness, CP, SOB, cough. (27 Jul 2018 01:10)      ***Consult  Pt known to service. Has chronic pressure ulcers being managed with wet to dry dressing with VNS support  No specific concerns                          8.9    7.64  )-----------( 364      ( 27 Jul 2018 08:53 )             28.7     EXAM:  Bilateral buttocks and sacrum : chronic wounds ; pink granulation   Sacrum ~5cm diameter with pale central base  Left buttock - largely dry healed skin with small open granulating areas   Right buttock larger pink open wound

## 2018-07-27 NOTE — CONSULT NOTE ADULT - ASSESSMENT
IMPRESSION  Pt with Suprapubic catheter & urinary tract infection, hx MRSA    U/A Nitrite: Positive / Leuk Esterase: Moderate / RBC: 2-5 /HPF / WBC 6-10 /HPF     On meropenem  IVPB 1000 milliGRAM(s) IV Intermittent every 8 hours  vancomycin  IVPB 1000 milliGRAM(s) IV Intermittent every 12 hours    Pt with systolic hypotension,   Pt with DM, decubitus, hx MRSA, wbc 13.25,     CT with ileus or early partial small bowel obstruction, ascites, sacral decubitus ulcer with direct   extension to sacrum, left femur, and bilateral posterior iliac bones into   the inferior pubic ramus, consistent with osteomyelitis. Stable   thoracolumbar Mina rods incompletely imaged. Stable chronic L1   moderate compression fracture deformity.    SUGGESTIONs  Await blood cultures  Need urine culture  Continue Vanco/Meropenem  Contact Isolation  ESR, CRP  Repeat white blood cell count  Await Burn Consult/ wound cultures  Await Surgical Consult Translating device/other (specify)

## 2018-07-27 NOTE — DIETITIAN INITIAL EVALUATION ADULT. - ENERGY NEEDS
calorie 1990-2487kcal (MSJ x 1.2-1.5 AF) for pressure ulcers  protein 92-99g (1.2-1.3g/kg CBW) for pressure ulcers/SILVINA- will monitor renal function, adjust PRN   fluid 1ml/kcal

## 2018-07-27 NOTE — DIETITIAN INITIAL EVALUATION ADULT. - OTHER INFO
Initial assessment for pressure ulcer consult:  Severe sepsis: - elevated lactate, hypotension, leukocytosis. - UTI vs infected decubitus ulcer vs bacteremia. IV abx. SILVINA 2/2 sepsis. Abd pain: opioid dependence.

## 2018-07-27 NOTE — H&P ADULT - ASSESSMENT
56 yo male presented for abdominal pain and decreased PO intake.    # 54 yo male presented for abdominal pain and decreased PO intake.    # Severe sepsis:  - elevated lactate, hypotension, leukocytosis  - UTI vs infected decubitus ulcer vs bacteremia  - IV , s/p 3.5 L bolus crystalloids  - IVF, NPO  - emperic IV Vancomycin, meropenem  - f/u CXR  - f/u with burn  - f/u with ID  - trend lactate    # SILVINA:  - Cr 1.3, no baseline  - 2/2 sepsis, trend BMP    # Abdominal pain:  - Opioid dependence  - evidence of ileus vs partial SBO  - NPO, IVF  - taper opioids  - f/u general surgery    # DM2:  - FS and insulin b/b    # elevated bleeding times:;  - not onanticoagulation  - in previous admissions INR 1.5, from 1.0 last year  - possibly nutritional deficiency  - trend INR PTT  - replete vit K when able to tolerate PO intake, FFP if bleeding    # DVT ppx: INR therapeutic, start HSC when INR < 1.5 56 yo male presented for abdominal pain and decreased PO intake.    # Severe sepsis:  - elevated lactate, hypotension, leukocytosis  - UTI vs infected decubitus ulcer vs bacteremia  - IV , s/p 3.5 L bolus crystalloids  - IVF, NPO  - emperic IV Vancomycin, meropenem  - f/u CXR  - f/u with burn  - f/u with ID  - trend lactate    # Chronic back pain 2/2 vertebral fracture:  - dialudid IV PRN, wean off opiates for partial SBO    # HTN:  - hold meds 2/2 severe sepsis and HoTN  - resume meds when stable    # SILVINA:  - Cr 1.3, no baseline  - 2/2 sepsis, trend BMP    # Abdominal pain:  - Opioid dependence  - evidence of ileus vs partial SBO  - NPO, IVF  - taper opioids  - f/u general surgery    # DM2:  - FS and insulin b/b    # elevated bleeding times:;  - not onanticoagulation  - in previous admissions INR 1.5, from 1.0 last year  - possibly nutritional deficiency  - trend INR PTT  - replete vit K when able to tolerate PO intake, FFP if bleeding    # DVT ppx: INR therapeutic, start HSC when INR < 1.5

## 2018-07-27 NOTE — H&P ADULT - NSHPREVIEWOFSYSTEMS_GEN_ALL_CORE
REVIEW OF SYSTEMS      General:	negative  ENMT:	negative  Respiratory and Thorax: negative  Cardiovascular:	negative  Gastrointestinal:	abdominal pain and decreased PO intake  Genitourinary:	negative  Musculoskeletal:	negative  Neurological:     chronic back pain 2/2 sacral decubitus ulcers  Psychiatric:	negative  Hematology/Lymphatics:	negative  Endocrine:	DM2  Heme/immunologic: negative

## 2018-07-27 NOTE — H&P ADULT - ATTENDING COMMENTS
55 yr old male presented with abd pain  VITAL SIGNS (Last 24 hrs):  T(C): 36.4 (07-27-18 @ 14:12), Max: 37 (07-26-18 @ 16:23)  HR: 92 (07-27-18 @ 14:12) (86 - 95)  BP: 120/64 (07-27-18 @ 14:12) (70/52 - 120/64)  RR: 18 (07-27-18 @ 14:12) (16 - 95)  SpO2: 98% (07-27-18 @ 06:41) (98% - 98%)  Wt(kg): --  Daily Height in cm: 185.4 (27 Jul 2018 09:49)    Daily     I&O's Summary                        8.9    7.64  )-----------( 364      ( 27 Jul 2018 08:53 )             28.7   07-27    133<L>  |  98  |  21<H>  ----------------------------<  78  4.1   |  23  |  0.7    Ca    7.8<L>      27 Jul 2018 11:17  Phos  2.7     07-27  Mg     1.2     07-27    TPro  4.9<L>  /  Alb  1.6<L>  /  TBili  <0.2  /  DBili  <0.2  /  AST  11  /  ALT  6   /  AlkPhos  102  07-27  o/e  aaox3  chest-b/l air entry  abd/gi-soft, bs sluggish but presenr. suprapubic cath in place  cns- lower extremity are weak- power 2/5-- wheelchair bound  [id number]  ASSESSMENT AND PLAN:  #SEPSIS- WITH LEUCOCYTOSIS- resolving.osteomyelitis in sacrum with decubitus ulcer. evidence of UTI- hypotension resolving  # ileus vs partial SBO- had BM yesterday, likely sec to use of opioids-- no surgical intervention as per surgery  -start liquid diet  # vit k def - replete orally when SBO resolves

## 2018-07-27 NOTE — DIETITIAN INITIAL EVALUATION ADULT. - ORAL INTAKE PTA
fair/reports eating with fair appetite, likes pancakes or cereal for breakfast, sandwiches for lunch and hamburgers or steaks for dinner, takes vit C and zinc occasionally, no other supplement use, pt. states he's tried nutritional shakes- Ensure and Boost and didn't like them, inclined to try protein powder instead

## 2018-07-27 NOTE — DIETITIAN INITIAL EVALUATION ADULT. - PHYSICAL APPEARANCE
well nourished/BMI 22.3, alert&oriented, skin: pressure ulcers: stage II R,L foot, stage IV to sacrum and R buttocks

## 2018-07-27 NOTE — DIETITIAN INITIAL EVALUATION ADULT. - NS FNS REASON FOR WEIGHT CHANG
other (specify)/pt. reports diminished appetite and stress- recent death of parent/decreased po intake

## 2018-07-27 NOTE — H&P ADULT - NSHPPHYSICALEXAM_GEN_ALL_CORE
PHYSICAL EXAM:    T(C): 36.8 (07-27-18 @ 00:42), Max: 37 (07-26-18 @ 16:23)  HR: 91 (07-27-18 @ 00:42) (57 - 98)  BP: 77/50 (07-27-18 @ 00:42) (70/52 - 105/54)  RR: 95 (07-27-18 @ 00:42) (18 - 95)  SpO2: 100% (07-26-18 @ 15:27) (98% - 100%)    Constitutional:  HEENT: No oral lesions, no throat redness or swelling  Respiratory: Breath sounds  CTA b/l, no accessory muscle use  Cardiovascular: regular rate and rhytm, norml S1 S2, no murmurs  Gastrointestinal: soft tender upper abdomen no guarding positive BS non hyperactive  Genitourinary: no lesions, no discharge  Extremities: positive peripheral pulses no exremity edema, s/p right toe amputation, chronic anterior leg skin lesions, no signs of cellullitis  Neurological: AAO4 no focal deficit  Skin: multiple decubiti sacral & heel   Musculoskeletal: no joint swelling or tenderness

## 2018-07-27 NOTE — PROGRESS NOTE ADULT - ASSESSMENT
Chronic clean Stage 4 pressure ulcers  Rec: continue wet to dry dressings to open areas only        offloading

## 2018-07-27 NOTE — CONSULT NOTE ADULT - SUBJECTIVE AND OBJECTIVE BOX
Patient is a 57y old  Male who presents with a chief complaint of Abdominal pain (27 Jul 2018 01:10)      HPI:  58 yo M with PMHx DM, B/L LE weakness 2/2 previous lumbar trauma (able to stand but cannot walk), subsequent sacral decubiti and heel ulcers, suprapubic cath, p/w diffuse abdominal pain since for 1 day. His pain is 7/10 and usually takes high doses of pain medications but has since not taken most of them due to poor tolerance of PO intake. He lost 2 Kg weight since March, has no nausea, vomiting or diarrhea. Pt had BM on the day before admission as well as small BM in ED, no blood or melena. Pt unable to tolerate PO or take meds 2/2 pain. Pt states on 320mg methadone and 240mg oxycontin daily. Took total of 8 percocet tablets in past 12 hours. Denies fever, headache, lightheadedness, CP, SOB, cough. He states he recently lost his father and has not been eating well lately then started having abdominal distension. He states he had a BM yesterday it was not watery, or black or hardened stool. He was evaluated by surgery and placed on IVF and is now improving. He required vasopressor support for a short period of time, but is now stable without pressor support.    PAST MEDICAL & SURGICAL HISTORY:  Suprapubic catheter  Pressure ulcer  Diabetes  Lumbar compression fracture  S/P debridement  Toe amputation status, right  H/O hernia repair      SOCIAL HX:   Smoking     current smoker             ETOH                            Other    ROS:  See HPI     Allergies    sulfamethizole (Unknown)    PHYSICAL EXAM    ICU Vital Signs Last 24 Hrs  T(C): 36.7 (27 Jul 2018 06:41), Max: 37 (26 Jul 2018 16:23)  T(F): 98 (27 Jul 2018 06:41), Max: 98.6 (26 Jul 2018 16:23)  HR: 87 (27 Jul 2018 06:41) (57 - 98)  BP: 100/62 (27 Jul 2018 06:41) (70/52 - 113/62)  RR: 16 (27 Jul 2018 06:41) (16 - 95)  SpO2: 98% (27 Jul 2018 06:41) (98% - 100%)      General:  HEENT:  SUJIT              Lymph Nodes: No cervical LN   Lungs: Bilateral BS  Cardiovascular: Regular  Abdomen: Distended, non-tender  Extremities: No clubbing  Skin: Warm  Neurological: Non focal         LABS:                          8.9    7.64  )-----------( 364      ( 27 Jul 2018 08:53 )             28.7                                               07-26    138  |  95<L>  |  34<H>  ----------------------------<  99  4.7   |  26  |  1.3    Ca    8.3<L>      26 Jul 2018 14:52    TPro  6.1  /  Alb  2.3<L>  /  TBili  <0.2  /  DBili  x   /  AST  13  /  ALT  8   /  AlkPhos  131<H>  07-26      PT/INR - ( 26 Jul 2018 14:52 )   PT: 25.20 sec;   INR: 2.35 ratio         PTT - ( 26 Jul 2018 14:52 )  PTT:36.8 sec                                       Urinalysis Basic - ( 26 Jul 2018 14:52 )    Color: Other / Appearance: Turbid / SG: >=1.030 / pH: x  Gluc: x / Ketone: Negative  / Bili: Small / Urobili: 1.0   Blood: x / Protein: >=300 / Nitrite: Positive   Leuk Esterase: Moderate / RBC: 2-5 /HPF / WBC 6-10 /HPF   Sq Epi: x / Non Sq Epi: x / Bacteria: See Note                                                  LIVER FUNCTIONS - ( 26 Jul 2018 14:52 )  Alb: 2.3 g/dL / Pro: 6.1 g/dL / ALK PHOS: 131 U/L / ALT: 8 U/L / AST: 13 U/L / GGT: x                                                                                                                                         X-Rays  : no acute cardiopulmonary disease                                                                                    < from: CT Angio Abdomen and Pelvis w/ IV Cont (07.26.18 @ 18:27) >  IMPRESSION:    Atherosclerotic disease of the aorta and its branches, as above with   patent SMA, celiac axis, ROJAS and bilateral renal artery ostia.    Diffuse dilatation of the small bowel loops up to 3.6 cm, with suggestion   of relative collapse of the right lower quadrant small bowel loops.   Although no transitional point is definitively identified, evaluation is   limited secondary to the lack of oral contrast administration.   Differential includes ileus and early partial small bowel obstruction. If   clinically warranted, repeat CT abdomen pelvis with oral and intravenous   contrast administration or abdominal KUB can be obtained after   administration of oral contrast.    Small abdominopelvic ascites.    < end of copied text >      MEDICATIONS  (STANDING):  diazepam    Tablet 10 milliGRAM(s) Oral two times a day  gabapentin 800 milliGRAM(s) Oral three times a day  meropenem  IVPB      meropenem  IVPB 1000 milliGRAM(s) IV Intermittent every 8 hours  methadone    Tablet 80 milliGRAM(s) Oral four times a day  oxybutynin 10 milliGRAM(s) Oral daily  oxyCODONE    IR 30 milliGRAM(s) Oral four times a day  sodium chloride 0.9%. 1000 milliLiter(s) (125 mL/Hr) IV Continuous <Continuous>  vancomycin  IVPB 1000 milliGRAM(s) IV Intermittent every 12 hours    MEDICATIONS  (PRN):  HYDROmorphone  Injectable 1 milliGRAM(s) IV Push every 4 hours PRN Severe Pain (7 - 10)

## 2018-07-27 NOTE — CONSULT NOTE ADULT - ASSESSMENT
58 yo male with urosepsis and ileus     - npo, ivf  - antibiotics  - taper narcotics   - repleate electrolytes   - am obs series   - will follow

## 2018-07-28 RX ORDER — SIMETHICONE 80 MG/1
80 TABLET, CHEWABLE ORAL ONCE
Qty: 0 | Refills: 0 | Status: COMPLETED | OUTPATIENT
Start: 2018-07-28 | End: 2018-07-28

## 2018-07-28 RX ORDER — MAGNESIUM OXIDE 400 MG ORAL TABLET 241.3 MG
400 TABLET ORAL ONCE
Qty: 0 | Refills: 0 | Status: COMPLETED | OUTPATIENT
Start: 2018-07-28 | End: 2018-07-28

## 2018-07-28 RX ORDER — MAGNESIUM SULFATE 500 MG/ML
2 VIAL (ML) INJECTION ONCE
Qty: 0 | Refills: 0 | Status: COMPLETED | OUTPATIENT
Start: 2018-07-28 | End: 2018-07-28

## 2018-07-28 RX ORDER — MAGNESIUM SULFATE 500 MG/ML
2 VIAL (ML) INJECTION ONCE
Qty: 0 | Refills: 0 | Status: DISCONTINUED | OUTPATIENT
Start: 2018-07-28 | End: 2018-07-29

## 2018-07-28 RX ORDER — OXYBUTYNIN CHLORIDE 5 MG
10 TABLET ORAL AT BEDTIME
Qty: 0 | Refills: 0 | Status: DISCONTINUED | OUTPATIENT
Start: 2018-07-28 | End: 2018-07-29

## 2018-07-28 RX ADMIN — OXYCODONE HYDROCHLORIDE 30 MILLIGRAM(S): 5 TABLET ORAL at 21:30

## 2018-07-28 RX ADMIN — Medication 50 GRAM(S): at 19:05

## 2018-07-28 RX ADMIN — Medication 250 MILLIGRAM(S): at 05:11

## 2018-07-28 RX ADMIN — OXYCODONE HYDROCHLORIDE 30 MILLIGRAM(S): 5 TABLET ORAL at 05:45

## 2018-07-28 RX ADMIN — OXYCODONE HYDROCHLORIDE 30 MILLIGRAM(S): 5 TABLET ORAL at 05:15

## 2018-07-28 RX ADMIN — METHADONE HYDROCHLORIDE 80 MILLIGRAM(S): 40 TABLET ORAL at 00:14

## 2018-07-28 RX ADMIN — GABAPENTIN 800 MILLIGRAM(S): 400 CAPSULE ORAL at 13:49

## 2018-07-28 RX ADMIN — GABAPENTIN 800 MILLIGRAM(S): 400 CAPSULE ORAL at 05:16

## 2018-07-28 RX ADMIN — OXYCODONE HYDROCHLORIDE 30 MILLIGRAM(S): 5 TABLET ORAL at 20:54

## 2018-07-28 RX ADMIN — OXYCODONE HYDROCHLORIDE 30 MILLIGRAM(S): 5 TABLET ORAL at 00:44

## 2018-07-28 RX ADMIN — SIMETHICONE 80 MILLIGRAM(S): 80 TABLET, CHEWABLE ORAL at 12:12

## 2018-07-28 RX ADMIN — METHADONE HYDROCHLORIDE 80 MILLIGRAM(S): 40 TABLET ORAL at 05:16

## 2018-07-28 RX ADMIN — Medication 10 MILLIGRAM(S): at 12:11

## 2018-07-28 RX ADMIN — Medication 10 MILLIGRAM(S): at 19:09

## 2018-07-28 RX ADMIN — METHADONE HYDROCHLORIDE 80 MILLIGRAM(S): 40 TABLET ORAL at 19:09

## 2018-07-28 RX ADMIN — OXYCODONE HYDROCHLORIDE 30 MILLIGRAM(S): 5 TABLET ORAL at 12:10

## 2018-07-28 RX ADMIN — METHADONE HYDROCHLORIDE 80 MILLIGRAM(S): 40 TABLET ORAL at 12:11

## 2018-07-28 RX ADMIN — MEROPENEM 100 MILLIGRAM(S): 1 INJECTION INTRAVENOUS at 05:15

## 2018-07-28 RX ADMIN — OXYCODONE HYDROCHLORIDE 30 MILLIGRAM(S): 5 TABLET ORAL at 00:14

## 2018-07-28 RX ADMIN — Medication 10 MILLIGRAM(S): at 05:18

## 2018-07-28 RX ADMIN — GABAPENTIN 800 MILLIGRAM(S): 400 CAPSULE ORAL at 21:11

## 2018-07-28 NOTE — PROGRESS NOTE ADULT - SUBJECTIVE AND OBJECTIVE BOX
SUBJECTIVE:    Patient is a 57y old Male who presents with a chief complaint of Abdominal pain (27 Jul 2018 01:10)    Currently admitted to medicine with the primary diagnosis of Abdominal pain     Today is hospital day 2d. This morning he is resting comfortably in bed and reports no new issues or overnight events.     PAST MEDICAL & SURGICAL HISTORY  Suprapubic catheter  Pressure ulcer  Diabetes  Lumbar compression fracture  S/P debridement  Toe amputation status, right  H/O hernia repair    SOCIAL HISTORY:  Negative for smoking/alcohol/drug use.     ALLERGIES:  sulfamethizole (Unknown)    MEDICATIONS:  STANDING MEDICATIONS  diazepam    Tablet 10 milliGRAM(s) Oral two times a day  gabapentin 800 milliGRAM(s) Oral three times a day  methadone    Tablet 80 milliGRAM(s) Oral four times a day  oxybutynin 10 milliGRAM(s) Oral daily  oxyCODONE    IR 30 milliGRAM(s) Oral four times a day    PRN MEDICATIONS  HYDROmorphone  Injectable 1 milliGRAM(s) IV Push every 4 hours PRN    VITALS:   T(F): 98.3  HR: 98  BP: 128/73  RR: 16  SpO2: --    LABS:                        8.9    7.64  )-----------( 364      ( 27 Jul 2018 08:53 )             28.7     07-27    133<L>  |  98  |  21<H>  ----------------------------<  78  4.1   |  23  |  0.7    Ca    7.8<L>      27 Jul 2018 11:17  Phos  2.7     07-27  Mg     1.2     07-27    TPro  4.9<L>  /  Alb  1.6<L>  /  TBili  <0.2  /  DBili  <0.2  /  AST  11  /  ALT  6   /  AlkPhos  102  07-27    PT/INR - ( 27 Jul 2018 11:17 )   PT: 24.80 sec;   INR: 2.32 ratio         PTT - ( 27 Jul 2018 11:17 )  PTT:32.0 sec  Urinalysis Basic - ( 26 Jul 2018 14:52 )    Color: Other / Appearance: Turbid / SG: >=1.030 / pH: x  Gluc: x / Ketone: Negative  / Bili: Small / Urobili: 1.0   Blood: x / Protein: >=300 / Nitrite: Positive   Leuk Esterase: Moderate / RBC: 2-5 /HPF / WBC 6-10 /HPF   Sq Epi: x / Non Sq Epi: x / Bacteria: See Note            Culture - Urine (collected 26 Jul 2018 14:52)  Source: .Urine Catheterized  Final Report (27 Jul 2018 22:25):    Culture grew 3 or more types of organisms which indicate    collection contamination; consider recollection only if clinically    indicated.          RADIOLOGY:    PHYSICAL EXAM:  GEN: No acute distress  LUNGS: Clear to auscultation bilaterally   HEART: S1/S2 present. RRR.   ABD/ GI: Soft, non-tender, non-distended. Bowel sounds present  EXT: NC/NC/NE/2+PP/FORD  NEURO: AAOX3 SUBJECTIVE:    Patient is a 57y old Male who presents with a chief complaint of Abdominal pain (27 Jul 2018 01:10)    Currently admitted to medicine with the primary diagnosis of Abdominal pain     Today is hospital day 2d. This morning he is resting comfortably in bed and reports no new issues or overnight events.     PAST MEDICAL & SURGICAL HISTORY  Suprapubic catheter  Pressure ulcer  Diabetes  Lumbar compression fracture  S/P debridement  Toe amputation status, right  H/O hernia repair    SOCIAL HISTORY:  Negative for smoking/alcohol/drug use.     ALLERGIES:  sulfamethizole (Unknown)    MEDICATIONS:  STANDING MEDICATIONS  diazepam    Tablet 10 milliGRAM(s) Oral two times a day  gabapentin 800 milliGRAM(s) Oral three times a day  methadone    Tablet 80 milliGRAM(s) Oral four times a day  oxybutynin 10 milliGRAM(s) Oral daily  oxyCODONE    IR 30 milliGRAM(s) Oral four times a day    PRN MEDICATIONS  HYDROmorphone  Injectable 1 milliGRAM(s) IV Push every 4 hours PRN    VITALS:   T(F): 98.3  HR: 98  BP: 128/73  RR: 16  SpO2: --    LABS:                        8.9    7.64  )-----------( 364      ( 27 Jul 2018 08:53 )             28.7     07-27    133<L>  |  98  |  21<H>  ----------------------------<  78  4.1   |  23  |  0.7    Ca    7.8<L>      27 Jul 2018 11:17  Phos  2.7     07-27  Mg     1.2     07-27    TPro  4.9<L>  /  Alb  1.6<L>  /  TBili  <0.2  /  DBili  <0.2  /  AST  11  /  ALT  6   /  AlkPhos  102  07-27    PT/INR - ( 27 Jul 2018 11:17 )   PT: 24.80 sec;   INR: 2.32 ratio         PTT - ( 27 Jul 2018 11:17 )  PTT:32.0 sec  Urinalysis Basic - ( 26 Jul 2018 14:52 )    Color: Other / Appearance: Turbid / SG: >=1.030 / pH: x  Gluc: x / Ketone: Negative  / Bili: Small / Urobili: 1.0   Blood: x / Protein: >=300 / Nitrite: Positive   Leuk Esterase: Moderate / RBC: 2-5 /HPF / WBC 6-10 /HPF   Sq Epi: x / Non Sq Epi: x / Bacteria: See Note            Culture - Urine (collected 26 Jul 2018 14:52)  Source: .Urine Catheterized  Final Report (27 Jul 2018 22:25):    Culture grew 3 or more types of organisms which indicate    collection contamination; consider recollection only if clinically    indicated.          RADIOLOGY:    PHYSICAL EXAM:  GEN: No acute distress  LUNGS: Clear to auscultation bilaterally   HEART: S1/S2 present. RRR.   ABD/ GI: Soft, non-tender, non-distended. Bowel sounds present. suprapubic cath present  EXT: weakness lower ext  NEURO: AAOX3 Home

## 2018-07-28 NOTE — PROGRESS NOTE ADULT - SUBJECTIVE AND OBJECTIVE BOX
24H events:    Patient is a 57y old Male who presents with a chief complaint of Abdominal pain (27 Jul 2018 01:10)    Primary diagnosis of Abdominal pain     Today is hospital day 2d. No acute events over night. Abdominal pain improved and he is tolerating PO intakes well.     PAST MEDICAL & SURGICAL HISTORY  Suprapubic catheter  Pressure ulcer  Diabetes  Lumbar compression fracture  S/P debridement  Toe amputation status, right  H/O hernia repair    SOCIAL HISTORY:  Negative for smoking/alcohol/drug use.     ALLERGIES:  sulfamethizole (Unknown)    MEDICATIONS:  STANDING MEDICATIONS  diazepam    Tablet 10 milliGRAM(s) Oral two times a day  gabapentin 800 milliGRAM(s) Oral three times a day  methadone    Tablet 80 milliGRAM(s) Oral four times a day  oxybutynin 10 milliGRAM(s) Oral daily  oxyCODONE    IR 30 milliGRAM(s) Oral four times a day  simethicone 80 milliGRAM(s) Chew once  sodium chloride 0.9%. 1000 milliLiter(s) IV Continuous <Continuous>  vancomycin  IVPB 1000 milliGRAM(s) IV Intermittent every 12 hours    PRN MEDICATIONS  HYDROmorphone  Injectable 1 milliGRAM(s) IV Push every 4 hours PRN    VITALS:   T(F): 97.7  HR: 86  BP: 134/77  RR: 16  SpO2: --    LABS:                        8.9    7.64  )-----------( 364      ( 27 Jul 2018 08:53 )             28.7     07-27    133<L>  |  98  |  21<H>  ----------------------------<  78  4.1   |  23  |  0.7    Ca    7.8<L>      27 Jul 2018 11:17  Phos  2.7     07-27  Mg     1.2     07-27    TPro  4.9<L>  /  Alb  1.6<L>  /  TBili  <0.2  /  DBili  <0.2  /  AST  11  /  ALT  6   /  AlkPhos  102  07-27    PT/INR - ( 27 Jul 2018 11:17 )   PT: 24.80 sec;   INR: 2.32 ratio         PTT - ( 27 Jul 2018 11:17 )  PTT:32.0 sec  Urinalysis Basic - ( 26 Jul 2018 14:52 )    Color: Other / Appearance: Turbid / SG: >=1.030 / pH: x  Gluc: x / Ketone: Negative  / Bili: Small / Urobili: 1.0   Blood: x / Protein: >=300 / Nitrite: Positive   Leuk Esterase: Moderate / RBC: 2-5 /HPF / WBC 6-10 /HPF   Sq Epi: x / Non Sq Epi: x / Bacteria: See Note            Culture - Urine (collected 26 Jul 2018 14:52)  Source: .Urine Catheterized  Final Report (27 Jul 2018 22:25):    Culture grew 3 or more types of organisms which indicate    collection contamination; consider recollection only if clinically    indicated.          RADIOLOGY:    PHYSICAL EXAM:  General: NAD  HEENT:  SUJIT              Lymph Nodes: No cervical LN   Lungs: Bilateral BS  Cardiovascular: Regular  Abdomen: Distended, non-tender  Extremities: No clubbing  Skin: Warm  Neurological: BLE weakness     Assessment and Recommendation:   · Assessment		  Assessment:  shock likely due to hypovolemia as patient responded to fluids and is no longer requiring pressors  Ileus possibly due to dehydration vs medication side effect, mechanical obstruction is less likely. Improved with fluids.  UTI  Chronic suprapubic catheter  paraplegia with decubitus ulcers    Assessment and Plan:    Assessment:  · Assessment		  54 yo male presented for abdominal pain and decreased PO intake.    # Severe sepsis:  - elevated lactate, hypotension, leukocytosis  - UTI vs infected decubitus ulcer vs bacteremia  - IV , s/p 3.5 L bolus crystalloids  - Stop Abx as per ID  - Wet to dry as per burn     # Chronic back pain 2/2 vertebral fracture:  - dialudid IV PRN, wean off opiates for partial SBO    # HTN:  - hold meds 2/2 severe sepsis and HoTN  - resume meds when stable    # SILVINA:  - Cr 1.3, no baseline  - 2/2 sepsis, trend BMP    # Abdominal pain:  - Opioid dependence  - evidence of ileus vs partial SBO  - NPO, IVF  - taper opioids  - f/u general surgery    # DM2:  - FS and insulin b/b    # elevated bleeding times:;  - not onanticoagulation  - in previous admissions INR 1.5, from 1.0 last year  - possibly nutritional deficiency  - trend INR PTT  - replete vit K when able to tolerate PO intake, FFP if bleeding    # DVT ppx: INR therapeutic, start HSC when INR < 1.5

## 2018-07-28 NOTE — PROGRESS NOTE ADULT - SUBJECTIVE AND OBJECTIVE BOX
MARGE BELLA  57y, Male      OVERNIGHT EVENTS:    feels well, no diarrhea, mild abdominal upset but no pain.    VITALS:  T(F): 97.7, Max: 98.1 (07-27-18 @ 21:28)  HR: 86  BP: 134/77  RR: 16Vital Signs Last 24 Hrs  T(C): 36.5 (28 Jul 2018 04:45), Max: 36.7 (27 Jul 2018 21:28)  T(F): 97.7 (28 Jul 2018 04:45), Max: 98.1 (27 Jul 2018 21:28)  HR: 86 (28 Jul 2018 04:45) (82 - 92)  BP: 134/77 (28 Jul 2018 04:45) (87/50 - 134/77)  BP(mean): --  RR: 16 (28 Jul 2018 04:45) (16 - 18)  SpO2: --    TESTS & MEASUREMENTS:                        8.9    7.64  )-----------( 364      ( 27 Jul 2018 08:53 )             28.7     07-27    133<L>  |  98  |  21<H>  ----------------------------<  78  4.1   |  23  |  0.7    Ca    7.8<L>      27 Jul 2018 11:17  Phos  2.7     07-27  Mg     1.2     07-27    TPro  4.9<L>  /  Alb  1.6<L>  /  TBili  <0.2  /  DBili  <0.2  /  AST  11  /  ALT  6   /  AlkPhos  102  07-27    LIVER FUNCTIONS - ( 27 Jul 2018 11:17 )  Alb: 1.6 g/dL / Pro: 4.9 g/dL / ALK PHOS: 102 U/L / ALT: 6 U/L / AST: 11 U/L / GGT: x             Culture - Urine (collected 07-26-18 @ 14:52)  Source: .Urine Catheterized  Final Report (07-27-18 @ 22:25):    Culture grew 3 or more types of organisms which indicate    collection contamination; consider recollection only if clinically    indicated.      Urinalysis Basic - ( 26 Jul 2018 14:52 )    Color: Other / Appearance: Turbid / SG: >=1.030 / pH: x  Gluc: x / Ketone: Negative  / Bili: Small / Urobili: 1.0   Blood: x / Protein: >=300 / Nitrite: Positive   Leuk Esterase: Moderate / RBC: 2-5 /HPF / WBC 6-10 /HPF   Sq Epi: x / Non Sq Epi: x / Bacteria: See Note          RADIOLOGY & ADDITIONAL TESTS:    ANTIBIOTICS:  meropenem  IVPB      meropenem  IVPB 1000 milliGRAM(s) IV Intermittent every 8 hours  vancomycin  IVPB 1000 milliGRAM(s) IV Intermittent every 12 hours

## 2018-07-28 NOTE — PROGRESS NOTE ADULT - ASSESSMENT
56 yo M with PMHx DM, B/L LE weakness 2/2 previous lumbar trauma (able to stand but cannot walk), subsequent sacral decubiti and heel ulcers, suprapubic cath, p/w diffuse abdominal pain since for 1 day. His pain is 7/10 and usually takes high doses of pain medications but has since not taken most of them due to poor tolerance of PO intake. He lost 2 Kg weight since March, has no nausea, vomiting or diarrhea. Pt had BM on the day before admission as well as small BM in ED, no blood or melena. Pt unable to tolerate PO or take meds 2/2 pain. Pt states on 320mg methadone and 240mg oxycontin daily. Took total of 8 percocet tablets in past 12 hours. Denies fever, headache, lightheadedness, CP, SOB, cough.    IMPRESSION:  No sepsis.  Chronic sacral non infected ulcers.  SBO.    RECOMMENDATIONS:  D/c antibiotics

## 2018-07-28 NOTE — PROGRESS NOTE ADULT - ASSESSMENT
#SEPSIS- WITH LEUCOCYTOSIS- resolving.osteomyelitis in sacrum with decubitus ulcer. evidence of UTI- hypotension resolving-- DC antibiotics as per ID  # ileus vs partial SBO- had BM yesterday, likely sec to use of opioids-- no surgical intervention as per surgery  - advance diet as much as can tolerate  # vit k def - replete orally when SBO resolves .

## 2018-07-29 LAB
ALBUMIN SERPL ELPH-MCNC: 1.8 G/DL — LOW (ref 3.5–5.2)
ALP SERPL-CCNC: 117 U/L — HIGH (ref 30–115)
ALT FLD-CCNC: 8 U/L — SIGNIFICANT CHANGE UP (ref 0–41)
ANION GAP SERPL CALC-SCNC: 12 MMOL/L — SIGNIFICANT CHANGE UP (ref 7–14)
ANION GAP SERPL CALC-SCNC: 13 MMOL/L — SIGNIFICANT CHANGE UP (ref 7–14)
AST SERPL-CCNC: 16 U/L — SIGNIFICANT CHANGE UP (ref 0–41)
BILIRUB SERPL-MCNC: 0.2 MG/DL — SIGNIFICANT CHANGE UP (ref 0.2–1.2)
BUN SERPL-MCNC: 12 MG/DL — SIGNIFICANT CHANGE UP (ref 10–20)
BUN SERPL-MCNC: 13 MG/DL — SIGNIFICANT CHANGE UP (ref 10–20)
CALCIUM SERPL-MCNC: 8 MG/DL — LOW (ref 8.5–10.1)
CALCIUM SERPL-MCNC: 8.2 MG/DL — LOW (ref 8.5–10.1)
CHLORIDE SERPL-SCNC: 97 MMOL/L — LOW (ref 98–110)
CHLORIDE SERPL-SCNC: 99 MMOL/L — SIGNIFICANT CHANGE UP (ref 98–110)
CO2 SERPL-SCNC: 26 MMOL/L — SIGNIFICANT CHANGE UP (ref 17–32)
CO2 SERPL-SCNC: 28 MMOL/L — SIGNIFICANT CHANGE UP (ref 17–32)
CREAT SERPL-MCNC: 0.7 MG/DL — SIGNIFICANT CHANGE UP (ref 0.7–1.5)
CREAT SERPL-MCNC: 0.7 MG/DL — SIGNIFICANT CHANGE UP (ref 0.7–1.5)
GLUCOSE SERPL-MCNC: 77 MG/DL — SIGNIFICANT CHANGE UP (ref 70–99)
GLUCOSE SERPL-MCNC: 78 MG/DL — SIGNIFICANT CHANGE UP (ref 70–99)
HCT VFR BLD CALC: 29.9 % — LOW (ref 42–52)
HGB BLD-MCNC: 9.2 G/DL — LOW (ref 14–18)
MAGNESIUM SERPL-MCNC: 1.2 MG/DL — LOW (ref 1.8–2.4)
MCHC RBC-ENTMCNC: 23.9 PG — LOW (ref 27–31)
MCHC RBC-ENTMCNC: 30.8 G/DL — LOW (ref 32–37)
MCV RBC AUTO: 77.7 FL — LOW (ref 80–94)
NRBC # BLD: 0 /100 WBCS — SIGNIFICANT CHANGE UP (ref 0–0)
PLATELET # BLD AUTO: 439 K/UL — HIGH (ref 130–400)
POTASSIUM SERPL-MCNC: 3.9 MMOL/L — SIGNIFICANT CHANGE UP (ref 3.5–5)
POTASSIUM SERPL-MCNC: 4.1 MMOL/L — SIGNIFICANT CHANGE UP (ref 3.5–5)
POTASSIUM SERPL-SCNC: 3.9 MMOL/L — SIGNIFICANT CHANGE UP (ref 3.5–5)
POTASSIUM SERPL-SCNC: 4.1 MMOL/L — SIGNIFICANT CHANGE UP (ref 3.5–5)
PROT SERPL-MCNC: 5.5 G/DL — LOW (ref 6–8)
RBC # BLD: 3.85 M/UL — LOW (ref 4.7–6.1)
RBC # FLD: 15.6 % — HIGH (ref 11.5–14.5)
SODIUM SERPL-SCNC: 137 MMOL/L — SIGNIFICANT CHANGE UP (ref 135–146)
SODIUM SERPL-SCNC: 138 MMOL/L — SIGNIFICANT CHANGE UP (ref 135–146)
WBC # BLD: 8.84 K/UL — SIGNIFICANT CHANGE UP (ref 4.8–10.8)
WBC # FLD AUTO: 8.84 K/UL — SIGNIFICANT CHANGE UP (ref 4.8–10.8)

## 2018-07-29 RX ORDER — MAGNESIUM SULFATE 500 MG/ML
2 VIAL (ML) INJECTION ONCE
Qty: 0 | Refills: 0 | Status: COMPLETED | OUTPATIENT
Start: 2018-07-29 | End: 2018-07-29

## 2018-07-29 RX ORDER — LACTULOSE 10 G/15ML
10 SOLUTION ORAL ONCE
Qty: 0 | Refills: 0 | Status: COMPLETED | OUTPATIENT
Start: 2018-07-29 | End: 2018-07-29

## 2018-07-29 RX ORDER — PHYTONADIONE (VIT K1) 5 MG
5 TABLET ORAL ONCE
Qty: 0 | Refills: 0 | Status: COMPLETED | OUTPATIENT
Start: 2018-07-29 | End: 2018-07-29

## 2018-07-29 RX ORDER — OXYBUTYNIN CHLORIDE 5 MG
10 TABLET ORAL
Qty: 0 | Refills: 0 | Status: DISCONTINUED | OUTPATIENT
Start: 2018-07-29 | End: 2018-08-05

## 2018-07-29 RX ORDER — MAGNESIUM SULFATE 500 MG/ML
2 VIAL (ML) INJECTION ONCE
Qty: 0 | Refills: 0 | Status: COMPLETED | OUTPATIENT
Start: 2018-07-29 | End: 2018-07-30

## 2018-07-29 RX ADMIN — MAGNESIUM OXIDE 400 MG ORAL TABLET 400 MILLIGRAM(S): 241.3 TABLET ORAL at 00:07

## 2018-07-29 RX ADMIN — Medication 50 GRAM(S): at 18:00

## 2018-07-29 RX ADMIN — OXYCODONE HYDROCHLORIDE 30 MILLIGRAM(S): 5 TABLET ORAL at 01:45

## 2018-07-29 RX ADMIN — GABAPENTIN 800 MILLIGRAM(S): 400 CAPSULE ORAL at 06:53

## 2018-07-29 RX ADMIN — METHADONE HYDROCHLORIDE 80 MILLIGRAM(S): 40 TABLET ORAL at 00:09

## 2018-07-29 RX ADMIN — METHADONE HYDROCHLORIDE 80 MILLIGRAM(S): 40 TABLET ORAL at 11:51

## 2018-07-29 RX ADMIN — LACTULOSE 10 GRAM(S): 10 SOLUTION ORAL at 18:52

## 2018-07-29 RX ADMIN — GABAPENTIN 800 MILLIGRAM(S): 400 CAPSULE ORAL at 21:54

## 2018-07-29 RX ADMIN — OXYCODONE HYDROCHLORIDE 30 MILLIGRAM(S): 5 TABLET ORAL at 18:55

## 2018-07-29 RX ADMIN — METHADONE HYDROCHLORIDE 80 MILLIGRAM(S): 40 TABLET ORAL at 18:13

## 2018-07-29 RX ADMIN — Medication 10 MILLIGRAM(S): at 06:59

## 2018-07-29 RX ADMIN — OXYCODONE HYDROCHLORIDE 30 MILLIGRAM(S): 5 TABLET ORAL at 06:59

## 2018-07-29 RX ADMIN — Medication 10 MILLIGRAM(S): at 18:12

## 2018-07-29 RX ADMIN — GABAPENTIN 800 MILLIGRAM(S): 400 CAPSULE ORAL at 13:23

## 2018-07-29 RX ADMIN — METHADONE HYDROCHLORIDE 80 MILLIGRAM(S): 40 TABLET ORAL at 06:59

## 2018-07-29 RX ADMIN — OXYCODONE HYDROCHLORIDE 30 MILLIGRAM(S): 5 TABLET ORAL at 07:12

## 2018-07-29 RX ADMIN — OXYCODONE HYDROCHLORIDE 30 MILLIGRAM(S): 5 TABLET ORAL at 11:51

## 2018-07-29 RX ADMIN — Medication 10 MILLIGRAM(S): at 00:28

## 2018-07-29 RX ADMIN — OXYCODONE HYDROCHLORIDE 30 MILLIGRAM(S): 5 TABLET ORAL at 01:14

## 2018-07-29 RX ADMIN — Medication 10 MILLIGRAM(S): at 18:13

## 2018-07-29 NOTE — PROGRESS NOTE ADULT - ASSESSMENT
#SEPSIS- WITH LEUCOCYTOSIS- resolving.osteomyelitis in sacrum with decubitus ulcer. evidence of UTI- hypotension resolving-- DC antibiotics as per ID  # ileus vs partial SBO- had BM yesterday, likely sec to use of opioids-- no surgical intervention as per surgery  - advance diet as much as can tolerate  # vit k def - replete orally today  # Hypomagnesemia-- replete iv again today #SEPSIS- WITH LEUCOCYTOSIS- resolving.osteomyelitis in sacrum with decubitus ulcer. evidence of UTI- hypotension resolving-- DC antibiotics as per ID  # ileus vs partial SBO- had BM yesterday, likely sec to use of opioids-- no surgical intervention as per surgery  - advance diet as much as can tolerate. has constipation since yesterday-- glycerin suppository and one dose of lactulose--  if no BM will get obstructive series  # vit k def - replete orally when can take orally as now he is nauseous  # Hypomagnesemia-- replete iv again today-- lost iv access presently #SEPSIS- WITH LEUCOCYTOSIS- resolving.osteomyelitis in sacrum with decubitus ulcer. evidence of UTI- hypotension resolving-- DC antibiotics as per ID  # ileus vs partial SBO- had BM yesterday, likely sec to use of opioids-- no surgical intervention as per surgery  -  has constipation since yesterday-- one dose of lactulose-- and KUB --recall surgery PRN  # vit k def - replete orally when can take orally as now he is nauseous  # Hypomagnesemia-- replete iv again today-- lost iv access presently         case discussed with his friends

## 2018-07-29 NOTE — PROGRESS NOTE ADULT - SUBJECTIVE AND OBJECTIVE BOX
SUBJECTIVE:    Patient is a 57y old Male who presents with a chief complaint of Abdominal pain (27 Jul 2018 01:10)    Currently admitted to medicine with the primary diagnosis of Abdominal pain     Today is hospital day 3d. This morning he is resting comfortably in bed and reports no new issues or overnight events.     PAST MEDICAL & SURGICAL HISTORY  Suprapubic catheter  Pressure ulcer  Diabetes  Lumbar compression fracture  S/P debridement  Toe amputation status, right  H/O hernia repair    SOCIAL HISTORY:  Negative for smoking/alcohol/drug use.     ALLERGIES:  sulfamethizole (Unknown)    MEDICATIONS:  STANDING MEDICATIONS  diazepam    Tablet 10 milliGRAM(s) Oral two times a day  gabapentin 800 milliGRAM(s) Oral three times a day  magnesium sulfate  IVPB 2 Gram(s) IV Intermittent once  methadone    Tablet 80 milliGRAM(s) Oral four times a day  oxybutynin 10 milliGRAM(s) Oral two times a day  oxyCODONE    IR 30 milliGRAM(s) Oral four times a day    PRN MEDICATIONS  HYDROmorphone  Injectable 1 milliGRAM(s) IV Push every 4 hours PRN    VITALS:   T(F): 97.9  HR: 91  BP: 125/73  RR: 16  SpO2: --    LABS:                        9.2    8.84  )-----------( 439      ( 29 Jul 2018 10:51 )             29.9     07-29    138  |  99  |  12  ----------------------------<  78  3.9   |  26  |  0.7    Ca    8.2<L>      29 Jul 2018 10:51  Mg     1.2     07-29    TPro  5.5<L>  /  Alb  1.8<L>  /  TBili  0.2  /  DBili  x   /  AST  16  /  ALT  8   /  AlkPhos  117<H>  07-29              Culture - Blood (collected 27 Jul 2018 12:27)  Source: .Blood None  Preliminary Report (29 Jul 2018 01:01):    No growth to date.          RADIOLOGY:    PHYSICAL EXAM:  GEN: No acute distress  LUNGS: Clear to auscultation bilaterally   HEART: S1/S2 present. RRR.   ABD/ GI: Soft, non-tender, non-distended. Bowel sounds present  EXT: NC/NC/NE/2+PP/FORD  NEURO: AAOX3 SUBJECTIVE:    Patient is a 57y old Male who presents with a chief complaint of Abdominal pain (27 Jul 2018 01:10)    Currently admitted to medicine with the primary diagnosis of Abdominal pain     Today is hospital day 3d. This morning he is resting comfortably in bed and reports no new issues or overnight events.     PAST MEDICAL & SURGICAL HISTORY  Suprapubic catheter  Pressure ulcer  Diabetes  Lumbar compression fracture  S/P debridement  Toe amputation status, right  H/O hernia repair    SOCIAL HISTORY:  Negative for smoking/alcohol/drug use.     ALLERGIES:  sulfamethizole (Unknown)    MEDICATIONS:  STANDING MEDICATIONS  diazepam    Tablet 10 milliGRAM(s) Oral two times a day  gabapentin 800 milliGRAM(s) Oral three times a day  magnesium sulfate  IVPB 2 Gram(s) IV Intermittent once  methadone    Tablet 80 milliGRAM(s) Oral four times a day  oxybutynin 10 milliGRAM(s) Oral two times a day  oxyCODONE    IR 30 milliGRAM(s) Oral four times a day    PRN MEDICATIONS  HYDROmorphone  Injectable 1 milliGRAM(s) IV Push every 4 hours PRN    VITALS:   T(F): 97.9  HR: 91  BP: 125/73  RR: 16  SpO2: --    LABS:                        9.2    8.84  )-----------( 439      ( 29 Jul 2018 10:51 )             29.9     07-29    138  |  99  |  12  ----------------------------<  78  3.9   |  26  |  0.7    Ca    8.2<L>      29 Jul 2018 10:51  Mg     1.2     07-29    TPro  5.5<L>  /  Alb  1.8<L>  /  TBili  0.2  /  DBili  x   /  AST  16  /  ALT  8   /  AlkPhos  117<H>  07-29              Culture - Blood (collected 27 Jul 2018 12:27)  Source: .Blood None  Preliminary Report (29 Jul 2018 01:01):    No growth to date.          RADIOLOGY:    PHYSICAL EXAM:  GEN: No acute distress  LUNGS: Clear to auscultation bilaterally   HEART: S1/S2 present. RRR.   ABD/ GI: Soft, non-tender, non-distended. Bowel sounds present, suprapubic in place  EXT:  weakness 4/5 lower ext and is wheel chair dependent  NEURO: AAOX3 SUBJECTIVE:    Patient is a 57y old Male who presents with a chief complaint of Abdominal pain (27 Jul 2018 01:10)    Currently admitted to medicine with the primary diagnosis of Abdominal pain     Today is hospital day 3d. This morning he is  uncomfortable-- no BM    PAST MEDICAL & SURGICAL HISTORY  Suprapubic catheter  Pressure ulcer  Diabetes  Lumbar compression fracture  S/P debridement  Toe amputation status, right  H/O hernia repair    SOCIAL HISTORY:  Negative for smoking/alcohol/drug use.     ALLERGIES:  sulfamethizole (Unknown)    MEDICATIONS:  STANDING MEDICATIONS  diazepam    Tablet 10 milliGRAM(s) Oral two times a day  gabapentin 800 milliGRAM(s) Oral three times a day  magnesium sulfate  IVPB 2 Gram(s) IV Intermittent once  methadone    Tablet 80 milliGRAM(s) Oral four times a day  oxybutynin 10 milliGRAM(s) Oral two times a day  oxyCODONE    IR 30 milliGRAM(s) Oral four times a day    PRN MEDICATIONS  HYDROmorphone  Injectable 1 milliGRAM(s) IV Push every 4 hours PRN    VITALS:   T(F): 97.9  HR: 91  BP: 125/73  RR: 16  SpO2: --    LABS:                        9.2    8.84  )-----------( 439      ( 29 Jul 2018 10:51 )             29.9     07-29    138  |  99  |  12  ----------------------------<  78  3.9   |  26  |  0.7    Ca    8.2<L>      29 Jul 2018 10:51  Mg     1.2     07-29    TPro  5.5<L>  /  Alb  1.8<L>  /  TBili  0.2  /  DBili  x   /  AST  16  /  ALT  8   /  AlkPhos  117<H>  07-29              Culture - Blood (collected 27 Jul 2018 12:27)  Source: .Blood None  Preliminary Report (29 Jul 2018 01:01):    No growth to date.          RADIOLOGY:    PHYSICAL EXAM:  GEN: No acute distress  LUNGS: Clear to auscultation bilaterally   HEART: S1/S2 present. RRR.   ABD/ GI: Soft, non-tender, non-distended. Bowel sounds present, suprapubic in place  EXT:  weakness 4/5 lower ext and is wheel chair dependent  NEURO: AAOX3 SUBJECTIVE:    Patient is a 57y old Male who presents with a chief complaint of Abdominal pain (27 Jul 2018 01:10)    Currently admitted to medicine with the primary diagnosis of Abdominal pain     Today is hospital day 3d. This morning he is  uncomfortable-- no BM    PAST MEDICAL & SURGICAL HISTORY  Suprapubic catheter  Pressure ulcer  Diabetes  Lumbar compression fracture  S/P debridement  Toe amputation status, right  H/O hernia repair    SOCIAL HISTORY:  Negative for smoking/alcohol/drug use.     ALLERGIES:  sulfamethizole (Unknown)    MEDICATIONS:  STANDING MEDICATIONS  diazepam    Tablet 10 milliGRAM(s) Oral two times a day  gabapentin 800 milliGRAM(s) Oral three times a day  magnesium sulfate  IVPB 2 Gram(s) IV Intermittent once  methadone    Tablet 80 milliGRAM(s) Oral four times a day  oxybutynin 10 milliGRAM(s) Oral two times a day  oxyCODONE    IR 30 milliGRAM(s) Oral four times a day    PRN MEDICATIONS  HYDROmorphone  Injectable 1 milliGRAM(s) IV Push every 4 hours PRN    VITALS:   T(F): 97.9  HR: 91  BP: 125/73  RR: 16  SpO2: --    LABS:                        9.2    8.84  )-----------( 439      ( 29 Jul 2018 10:51 )             29.9     07-29    138  |  99  |  12  ----------------------------<  78  3.9   |  26  |  0.7    Ca    8.2<L>      29 Jul 2018 10:51  Mg     1.2     07-29    TPro  5.5<L>  /  Alb  1.8<L>  /  TBili  0.2  /  DBili  x   /  AST  16  /  ALT  8   /  AlkPhos  117<H>  07-29              Culture - Blood (collected 27 Jul 2018 12:27)  Source: .Blood None  Preliminary Report (29 Jul 2018 01:01):    No growth to date.          RADIOLOGY:    PHYSICAL EXAM:  GEN: No acute distress  LUNGS: Clear to auscultation bilaterally   HEART: S1/S2 present. RRR.   ABD/ GI: Soft, non-tender, non-distended. Bowel sounds sluggish, suprapubic in place  EXT:  weakness 4/5 lower ext and is wheel chair dependent  NEURO: AAOX3

## 2018-07-30 LAB
INR BLD: 1.93 RATIO — HIGH (ref 0.65–1.3)
MAGNESIUM SERPL-MCNC: 2.2 MG/DL — SIGNIFICANT CHANGE UP (ref 1.8–2.4)
PROTHROM AB SERPL-ACNC: 20.7 SEC — HIGH (ref 9.95–12.87)

## 2018-07-30 RX ORDER — PANTOPRAZOLE SODIUM 20 MG/1
40 TABLET, DELAYED RELEASE ORAL DAILY
Qty: 0 | Refills: 0 | Status: DISCONTINUED | OUTPATIENT
Start: 2018-07-30 | End: 2018-08-05

## 2018-07-30 RX ORDER — SODIUM CHLORIDE 9 MG/ML
1000 INJECTION, SOLUTION INTRAVENOUS
Qty: 0 | Refills: 0 | Status: DISCONTINUED | OUTPATIENT
Start: 2018-07-30 | End: 2018-08-01

## 2018-07-30 RX ORDER — ALPRAZOLAM 0.25 MG
2 TABLET ORAL DAILY
Qty: 0 | Refills: 0 | Status: DISCONTINUED | OUTPATIENT
Start: 2018-07-30 | End: 2018-08-05

## 2018-07-30 RX ORDER — PHYTONADIONE (VIT K1) 5 MG
5 TABLET ORAL ONCE
Qty: 0 | Refills: 0 | Status: COMPLETED | OUTPATIENT
Start: 2018-07-30 | End: 2018-07-30

## 2018-07-30 RX ADMIN — OXYCODONE HYDROCHLORIDE 30 MILLIGRAM(S): 5 TABLET ORAL at 01:45

## 2018-07-30 RX ADMIN — Medication 50 GRAM(S): at 06:11

## 2018-07-30 RX ADMIN — OXYCODONE HYDROCHLORIDE 30 MILLIGRAM(S): 5 TABLET ORAL at 06:10

## 2018-07-30 RX ADMIN — Medication 10 MILLIGRAM(S): at 06:10

## 2018-07-30 RX ADMIN — GABAPENTIN 800 MILLIGRAM(S): 400 CAPSULE ORAL at 13:25

## 2018-07-30 RX ADMIN — METHADONE HYDROCHLORIDE 80 MILLIGRAM(S): 40 TABLET ORAL at 01:13

## 2018-07-30 RX ADMIN — METHADONE HYDROCHLORIDE 80 MILLIGRAM(S): 40 TABLET ORAL at 12:16

## 2018-07-30 RX ADMIN — OXYCODONE HYDROCHLORIDE 30 MILLIGRAM(S): 5 TABLET ORAL at 18:44

## 2018-07-30 RX ADMIN — METHADONE HYDROCHLORIDE 80 MILLIGRAM(S): 40 TABLET ORAL at 18:44

## 2018-07-30 RX ADMIN — SODIUM CHLORIDE 50 MILLILITER(S): 9 INJECTION, SOLUTION INTRAVENOUS at 18:44

## 2018-07-30 RX ADMIN — Medication 10 MILLIGRAM(S): at 18:44

## 2018-07-30 RX ADMIN — Medication 5 MILLIGRAM(S): at 12:16

## 2018-07-30 RX ADMIN — Medication 10 MILLIGRAM(S): at 06:09

## 2018-07-30 RX ADMIN — PANTOPRAZOLE SODIUM 40 MILLIGRAM(S): 20 TABLET, DELAYED RELEASE ORAL at 18:45

## 2018-07-30 RX ADMIN — OXYCODONE HYDROCHLORIDE 30 MILLIGRAM(S): 5 TABLET ORAL at 13:25

## 2018-07-30 RX ADMIN — METHADONE HYDROCHLORIDE 80 MILLIGRAM(S): 40 TABLET ORAL at 06:10

## 2018-07-30 RX ADMIN — Medication 10 MILLIGRAM(S): at 18:45

## 2018-07-30 RX ADMIN — OXYCODONE HYDROCHLORIDE 30 MILLIGRAM(S): 5 TABLET ORAL at 01:13

## 2018-07-30 RX ADMIN — GABAPENTIN 800 MILLIGRAM(S): 400 CAPSULE ORAL at 23:13

## 2018-07-30 RX ADMIN — OXYCODONE HYDROCHLORIDE 30 MILLIGRAM(S): 5 TABLET ORAL at 06:40

## 2018-07-30 RX ADMIN — Medication 2 MILLIGRAM(S): at 12:16

## 2018-07-30 RX ADMIN — GABAPENTIN 800 MILLIGRAM(S): 400 CAPSULE ORAL at 06:09

## 2018-07-30 NOTE — PROGRESS NOTE ADULT - SUBJECTIVE AND OBJECTIVE BOX
24H events:    Patient is a 57y old Male who presents with a chief complaint of Abdominal pain (27 Jul 2018 01:10)    Today is hospital day 4d. 3 episodes of emesis following food. KUB ordered. NPO. Will reassess for NG placement after KUB.     PAST MEDICAL & SURGICAL HISTORY  Suprapubic catheter  Pressure ulcer  Diabetes  Lumbar compression fracture  S/P debridement  Toe amputation status, right  H/O hernia repair    SOCIAL HISTORY:  Negative for smoking/alcohol/drug use.     ALLERGIES:  sulfamethizole (Unknown)    MEDICATIONS:  STANDING MEDICATIONS  diazepam    Tablet 10 milliGRAM(s) Oral two times a day  gabapentin 800 milliGRAM(s) Oral three times a day  methadone    Tablet 80 milliGRAM(s) Oral four times a day  oxybutynin 10 milliGRAM(s) Oral two times a day  oxyCODONE    IR 30 milliGRAM(s) Oral four times a day    PRN MEDICATIONS    VITALS:   T(F): 96.7  HR: 93  BP: 117/73  RR: 18  SpO2: --    LABS:                        9.2    8.84  )-----------( 439      ( 29 Jul 2018 10:51 )             29.9     07-29    138  |  99  |  12  ----------------------------<  78  3.9   |  26  |  0.7    Ca    8.2<L>      29 Jul 2018 10:51  Mg     1.2     07-29    TPro  5.5<L>  /  Alb  1.8<L>  /  TBili  0.2  /  DBili  x   /  AST  16  /  ALT  8   /  AlkPhos  117<H>  07-29              Culture - Blood (collected 27 Jul 2018 12:27)  Source: .Blood None  Preliminary Report (29 Jul 2018 01:01):    No growth to date.          RADIOLOGY:    PHYSICAL EXAM:  General: NAD  HEENT:  SUJIT              Lymph Nodes: No cervical LN   Lungs: Bilateral BS  Cardiovascular: Regular  Abdomen: Distended, non-tender  Extremities: No clubbing  Skin: Warm  Neurological: BLE weakness     Assessment and Recommendation:   · Assessment		  Assessment:    # Abdominal pain:  - Opioid dependence  - evidence of ileus vs partial SBO  - NPO, IVF  - F/U KUB  - Will discuss NG tube with the patient   - Per general surgery no surgical intervention        # Chronic back pain 2/2 vertebral fracture:  - dialudid IV PRN, wean off opiates for partial SBO    # HTN:  - resume meds when stable  - SP in the 110s    # DM2:  - FS and insulin b/b    # elevated bleeding times:;  - not onanticoagulation  - in previous admissions INR 1.5, from 1.0 last year  - possibly nutritional deficiency  - trend INR PTT  - replete vit K when able to tolerate PO intake, FFP if bleeding    # DVT ppx: INR therapeutic, start HSC when INR < 1.5

## 2018-07-30 NOTE — PROGRESS NOTE ADULT - ASSESSMENT
#SEPSIS- WITH LEUCOCYTOSIS- resolving.osteomyelitis in sacrum with decubitus ulcer.   Possible UTI--- DC antibiotics as per ID  # ileus vs partial SBO- had BM yesterday, likely sec to use of opioids-- no surgical intervention as per surgery  -  has constipation since yesterday-- one dose of lactulose    KUB  : stable mildly distended small bowel, SBO vs Ileus  # vit k def - replete orally when can take orally as now he is nauseous  # Hypomagnesemia-- replete iv again today- #SEPSIS- WITH LEUCOCYTOSIS- resolving.osteomyelitis in sacrum with decubitus ulcer.   Possible UTI--- DC antibiotics as per ID  # ileus vs partial SBO- had BM yesterday, likely sec to use of opioids-- no surgical intervention as per surgery    Would f/u with SUrgery   KUB  : stable mildly distended small bowel, SBO vs Ileus  kept NPO.  IVF LR @ 50 cc/hr.  # vit k def - replete orally when can take orally as now he is nauseous  # Hypomagnesemia-- replete iv again today-       DVT/GI PPX

## 2018-07-31 LAB
ALBUMIN SERPL ELPH-MCNC: 2.1 G/DL — LOW (ref 3.5–5.2)
ALP SERPL-CCNC: 103 U/L — SIGNIFICANT CHANGE UP (ref 30–115)
ALT FLD-CCNC: 7 U/L — SIGNIFICANT CHANGE UP (ref 0–41)
ANION GAP SERPL CALC-SCNC: 13 MMOL/L — SIGNIFICANT CHANGE UP (ref 7–14)
APTT BLD: 40.6 SEC — HIGH (ref 27–39.2)
AST SERPL-CCNC: 15 U/L — SIGNIFICANT CHANGE UP (ref 0–41)
BILIRUB SERPL-MCNC: 0.2 MG/DL — SIGNIFICANT CHANGE UP (ref 0.2–1.2)
BUN SERPL-MCNC: 13 MG/DL — SIGNIFICANT CHANGE UP (ref 10–20)
CALCIUM SERPL-MCNC: 8 MG/DL — LOW (ref 8.5–10.1)
CHLORIDE SERPL-SCNC: 96 MMOL/L — LOW (ref 98–110)
CO2 SERPL-SCNC: 27 MMOL/L — SIGNIFICANT CHANGE UP (ref 17–32)
CREAT SERPL-MCNC: 0.7 MG/DL — SIGNIFICANT CHANGE UP (ref 0.7–1.5)
GLUCOSE SERPL-MCNC: 59 MG/DL — LOW (ref 70–99)
HCT VFR BLD CALC: 30.2 % — LOW (ref 42–52)
HGB BLD-MCNC: 9.3 G/DL — LOW (ref 14–18)
INR BLD: 1.92 RATIO — HIGH (ref 0.65–1.3)
MCHC RBC-ENTMCNC: 24.1 PG — LOW (ref 27–31)
MCHC RBC-ENTMCNC: 30.8 G/DL — LOW (ref 32–37)
MCV RBC AUTO: 78.2 FL — LOW (ref 80–94)
NRBC # BLD: 0 /100 WBCS — SIGNIFICANT CHANGE UP (ref 0–0)
PLATELET # BLD AUTO: 336 K/UL — SIGNIFICANT CHANGE UP (ref 130–400)
POTASSIUM SERPL-MCNC: 4.2 MMOL/L — SIGNIFICANT CHANGE UP (ref 3.5–5)
POTASSIUM SERPL-SCNC: 4.2 MMOL/L — SIGNIFICANT CHANGE UP (ref 3.5–5)
PROT SERPL-MCNC: 5.2 G/DL — LOW (ref 6–8)
PROTHROM AB SERPL-ACNC: 20.6 SEC — HIGH (ref 9.95–12.87)
RBC # BLD: 3.86 M/UL — LOW (ref 4.7–6.1)
RBC # FLD: 15.6 % — HIGH (ref 11.5–14.5)
SODIUM SERPL-SCNC: 136 MMOL/L — SIGNIFICANT CHANGE UP (ref 135–146)
WBC # BLD: 7.3 K/UL — SIGNIFICANT CHANGE UP (ref 4.8–10.8)
WBC # FLD AUTO: 7.3 K/UL — SIGNIFICANT CHANGE UP (ref 4.8–10.8)

## 2018-07-31 RX ORDER — MIDAZOLAM HYDROCHLORIDE 1 MG/ML
2.5 INJECTION, SOLUTION INTRAMUSCULAR; INTRAVENOUS ONCE
Qty: 0 | Refills: 0 | Status: DISCONTINUED | OUTPATIENT
Start: 2018-07-31 | End: 2018-07-31

## 2018-07-31 RX ORDER — IOHEXOL 300 MG/ML
30 INJECTION, SOLUTION INTRAVENOUS ONCE
Qty: 0 | Refills: 0 | Status: COMPLETED | OUTPATIENT
Start: 2018-07-31 | End: 2018-07-31

## 2018-07-31 RX ORDER — ACETAMINOPHEN 500 MG
650 TABLET ORAL EVERY 6 HOURS
Qty: 0 | Refills: 0 | Status: DISCONTINUED | OUTPATIENT
Start: 2018-07-31 | End: 2018-08-05

## 2018-07-31 RX ORDER — MIDAZOLAM HYDROCHLORIDE 1 MG/ML
5 INJECTION, SOLUTION INTRAMUSCULAR; INTRAVENOUS ONCE
Qty: 0 | Refills: 0 | Status: DISCONTINUED | OUTPATIENT
Start: 2018-07-31 | End: 2018-07-31

## 2018-07-31 RX ADMIN — Medication 10 MILLIGRAM(S): at 17:28

## 2018-07-31 RX ADMIN — METHADONE HYDROCHLORIDE 80 MILLIGRAM(S): 40 TABLET ORAL at 11:03

## 2018-07-31 RX ADMIN — METHADONE HYDROCHLORIDE 80 MILLIGRAM(S): 40 TABLET ORAL at 23:17

## 2018-07-31 RX ADMIN — IOHEXOL 30 MILLILITER(S): 300 INJECTION, SOLUTION INTRAVENOUS at 20:36

## 2018-07-31 RX ADMIN — OXYCODONE HYDROCHLORIDE 30 MILLIGRAM(S): 5 TABLET ORAL at 12:15

## 2018-07-31 RX ADMIN — METHADONE HYDROCHLORIDE 80 MILLIGRAM(S): 40 TABLET ORAL at 07:25

## 2018-07-31 RX ADMIN — GABAPENTIN 800 MILLIGRAM(S): 400 CAPSULE ORAL at 21:09

## 2018-07-31 RX ADMIN — GABAPENTIN 800 MILLIGRAM(S): 400 CAPSULE ORAL at 07:18

## 2018-07-31 RX ADMIN — OXYCODONE HYDROCHLORIDE 30 MILLIGRAM(S): 5 TABLET ORAL at 22:46

## 2018-07-31 RX ADMIN — OXYCODONE HYDROCHLORIDE 30 MILLIGRAM(S): 5 TABLET ORAL at 07:27

## 2018-07-31 RX ADMIN — OXYCODONE HYDROCHLORIDE 30 MILLIGRAM(S): 5 TABLET ORAL at 17:27

## 2018-07-31 RX ADMIN — OXYCODONE HYDROCHLORIDE 30 MILLIGRAM(S): 5 TABLET ORAL at 11:04

## 2018-07-31 RX ADMIN — GABAPENTIN 800 MILLIGRAM(S): 400 CAPSULE ORAL at 13:10

## 2018-07-31 RX ADMIN — PANTOPRAZOLE SODIUM 40 MILLIGRAM(S): 20 TABLET, DELAYED RELEASE ORAL at 11:04

## 2018-07-31 RX ADMIN — Medication 10 MILLIGRAM(S): at 07:18

## 2018-07-31 RX ADMIN — SODIUM CHLORIDE 50 MILLILITER(S): 9 INJECTION, SOLUTION INTRAVENOUS at 17:27

## 2018-07-31 RX ADMIN — OXYCODONE HYDROCHLORIDE 30 MILLIGRAM(S): 5 TABLET ORAL at 00:51

## 2018-07-31 RX ADMIN — OXYCODONE HYDROCHLORIDE 30 MILLIGRAM(S): 5 TABLET ORAL at 07:25

## 2018-07-31 RX ADMIN — OXYCODONE HYDROCHLORIDE 30 MILLIGRAM(S): 5 TABLET ORAL at 00:11

## 2018-07-31 RX ADMIN — Medication 10 MILLIGRAM(S): at 07:25

## 2018-07-31 RX ADMIN — Medication 10 MILLIGRAM(S): at 17:27

## 2018-07-31 RX ADMIN — METHADONE HYDROCHLORIDE 80 MILLIGRAM(S): 40 TABLET ORAL at 17:28

## 2018-07-31 RX ADMIN — Medication 2 MILLIGRAM(S): at 11:03

## 2018-07-31 RX ADMIN — MIDAZOLAM HYDROCHLORIDE 2.5 MILLIGRAM(S): 1 INJECTION, SOLUTION INTRAMUSCULAR; INTRAVENOUS at 17:00

## 2018-07-31 RX ADMIN — METHADONE HYDROCHLORIDE 80 MILLIGRAM(S): 40 TABLET ORAL at 00:11

## 2018-07-31 RX ADMIN — OXYCODONE HYDROCHLORIDE 30 MILLIGRAM(S): 5 TABLET ORAL at 23:16

## 2018-07-31 NOTE — PROGRESS NOTE ADULT - ASSESSMENT
IMPRESSION:  No sepsis.  Chronic sacral non infected ulcers.  SBO.    RECOMMENDATIONS:  Maintain off antibiotics.   Recall prn please.

## 2018-07-31 NOTE — PROGRESS NOTE ADULT - SUBJECTIVE AND OBJECTIVE BOX
24H events:    Patient is a 57y old Male who presents with a chief complaint of Abdominal pain (27 Jul 2018 01:10)    Today is hospital day 5d. The patient has been NPO. Declined NG placement. Abdomen soft, nontender. No N/V.      PAST MEDICAL & SURGICAL HISTORY  Suprapubic catheter  Pressure ulcer  Diabetes  Lumbar compression fracture  S/P debridement  Toe amputation status, right  H/O hernia repair    SOCIAL HISTORY:  Negative for smoking/alcohol/drug use.     ALLERGIES:  sulfamethizole (Unknown)    MEDICATIONS:  STANDING MEDICATIONS  ALPRAZolam 2 milliGRAM(s) Oral daily  diazepam    Tablet 10 milliGRAM(s) Oral two times a day  gabapentin 800 milliGRAM(s) Oral three times a day  lactated ringers. 1000 milliLiter(s) IV Continuous <Continuous>  methadone    Tablet 80 milliGRAM(s) Oral four times a day  oxybutynin 10 milliGRAM(s) Oral two times a day  oxyCODONE    IR 30 milliGRAM(s) Oral four times a day  pantoprazole  Injectable 40 milliGRAM(s) IV Push daily    PRN MEDICATIONS    VITALS:   T(F): 97.1  HR: 94  BP: 100/66  RR: 18  SpO2: 95%    LABS:                        9.3    7.30  )-----------( 336      ( 31 Jul 2018 08:27 )             30.2     07-31    136  |  96<L>  |  13  ----------------------------<  59<L>  4.2   |  27  |  0.7    Ca    8.0<L>      31 Jul 2018 08:27  Mg     2.2     07-30    TPro  5.2<L>  /  Alb  2.1<L>  /  TBili  0.2  /  DBili  x   /  AST  15  /  ALT  7   /  AlkPhos  103  07-31    PT/INR - ( 30 Jul 2018 10:03 )   PT: 20.70 sec;   INR: 1.93 ratio                       RADIOLOGY:    PHYSICAL EXAM:  General: NAD  HEENT:  SUJIT              Lymph Nodes: No cervical LN   Lungs: Bilateral BS  Cardiovascular: Regular  Abdomen: Distended, non-tender  Extremities: No clubbing  Skin: Warm  Neurological: BLE weakness     Assessment and Recommendation:   · Assessment		  Assessment:    # Abdominal pain:  - Opioid dependence  - evidence of ileus vs partial SBO  - NPO, IVF  - F/U KUB  - Will discuss NG tube with the patient , refused  - Per general surgery no surgical intervention at this moment.        # Chronic back pain 2/2 vertebral fracture:  - dialudid IV PRN, wean off opiates for partial SBO    # HTN:  - resume meds when stable  - SP in the 110s    # DM2:  - FS and insulin b/b    # elevated bleeding times:;  - not on anticoagulation  - in previous admissions INR 1.5, from 1.0 last year  - possibly nutritional deficiency  - trend INR PTT  - replete vit K when able to tolerate PO intake, FFP if bleeding    # DVT ppx: INR therapeutic, start HSC when INR < 1.5

## 2018-07-31 NOTE — PROGRESS NOTE ADULT - SUBJECTIVE AND OBJECTIVE BOX
MARGE BELLA  57y  Male      Patient is a 57y old  Male who presents with a chief complaint of Abdominal pain (27 Jul 2018 01:10)      INTERVAL HPI/OVERNIGHT EVENTS:      ******************************* REVIEW OF SYSTEMS:**********************************************    All other review of systems negative    *********************** VITALS ******************************************    T(F): 98.5 (07-31-18 @ 13:07)  HR: 96 (07-31-18 @ 13:07) (89 - 96)  BP: 118/75 (07-31-18 @ 13:07) (98/67 - 118/75)  RR: 18 (07-31-18 @ 13:07) (18 - 18)  SpO2: 95% (07-31-18 @ 07:48) (95% - 95%)    07-30-18 @ 07:01  -  07-31-18 @ 07:00  --------------------------------------------------------  IN: 0 mL / OUT: 1600 mL / NET: -1600 mL    07-31-18 @ 07:01  -  07-31-18 @ 16:11  --------------------------------------------------------  IN: 450 mL / OUT: 0 mL / NET: 450 mL            07-30-18 @ 07:01  -  07-31-18 @ 07:00  --------------------------------------------------------  IN: 0 mL / OUT: 1600 mL / NET: -1600 mL    07-31-18 @ 07:01  -  07-31-18 @ 16:11  --------------------------------------------------------  IN: 450 mL / OUT: 0 mL / NET: 450 mL        ******************************** PHYSICAL EXAM:**************************************************  GENERAL: NAD    PSYCH: no agitation, baseline mentation  HEENT:     NERVOUS SYSTEM:  Alert & Oriented X3, MS  3-4/5 B/L   LE ;   PULMONARY: DESIRAE, CTA    CARDIOVASCULAR: S1S2 RRR    GI: Soft, NT, increased distension with sluggish bowel sound.  EXTREMITIES:  2+ Peripheral Pulses, No clubbing, cyanosis, or edema    LYMPH: No lymphadenopathy noted    SKIN: hip/sacral wound     ******************************************************************************************    Consultant(s) Notes Reviewed:  [x ] YES  [ ] NO    Discussed with Consultants/Other Providers [ x] YES     **************************** LABS *******************************************************                          9.3    7.30  )-----------( 336      ( 31 Jul 2018 08:27 )             30.2     07-31    136  |  96<L>  |  13  ----------------------------<  59<L>  4.2   |  27  |  0.7    Ca    8.0<L>      31 Jul 2018 08:27  Mg     2.2     07-30    TPro  5.2<L>  /  Alb  2.1<L>  /  TBili  0.2  /  DBili  x   /  AST  15  /  ALT  7   /  AlkPhos  103  07-31        PT/INR - ( 31 Jul 2018 12:15 )   PT: 20.60 sec;   INR: 1.92 ratio         PTT - ( 31 Jul 2018 12:15 )  PTT:40.6 sec  Lactate Trend  07-27 @ 11:17 Lactate:0.8   07-27 @ 10:47 Lactate:1.5         CAPILLARY BLOOD GLUCOSE  74 (31 Jul 2018 11:15)              **************************Active Medications *******************************************  sulfamethizole (Unknown)      ALPRAZolam 2 milliGRAM(s) Oral daily  diazepam    Tablet 10 milliGRAM(s) Oral two times a day  gabapentin 800 milliGRAM(s) Oral three times a day  lactated ringers. 1000 milliLiter(s) IV Continuous <Continuous>  methadone    Tablet 80 milliGRAM(s) Oral four times a day  oxybutynin 10 milliGRAM(s) Oral two times a day  oxyCODONE    IR 30 milliGRAM(s) Oral four times a day  pantoprazole  Injectable 40 milliGRAM(s) IV Push daily      ***************************************************  RADIOLOGY & ADDITIONAL TESTS:    Imaging Personally Reviewed:  [ ] YES  [ ] NO    HEALTH ISSUES - PROBLEM Dx:

## 2018-07-31 NOTE — PROGRESS NOTE ADULT - SUBJECTIVE AND OBJECTIVE BOX
MARGE BELLA  57y, Male      OVERNIGHT EVENTS:    no fevers, no new complaints.    VITALS:  T(F): 97.1, Max: 97.7 (07-30-18 @ 21:00)  HR: 94  BP: 100/66  RR: 18Vital Signs Last 24 Hrs  T(C): 36.2 (31 Jul 2018 05:15), Max: 36.5 (30 Jul 2018 21:00)  T(F): 97.1 (31 Jul 2018 05:15), Max: 97.7 (30 Jul 2018 21:00)  HR: 94 (31 Jul 2018 05:15) (89 - 94)  BP: 100/66 (31 Jul 2018 05:15) (98/67 - 100/66)  BP(mean): --  RR: 18 (31 Jul 2018 05:15) (18 - 18)  SpO2: 95% (31 Jul 2018 07:48) (95% - 95%)    TESTS & MEASUREMENTS:                        9.2    8.84  )-----------( 439      ( 29 Jul 2018 10:51 )             29.9     07-29    138  |  99  |  12  ----------------------------<  78  3.9   |  26  |  0.7    Ca    8.2<L>      29 Jul 2018 10:51  Mg     2.2     07-30    TPro  5.5<L>  /  Alb  1.8<L>  /  TBili  0.2  /  DBili  x   /  AST  16  /  ALT  8   /  AlkPhos  117<H>  07-29    LIVER FUNCTIONS - ( 29 Jul 2018 10:51 )  Alb: 1.8 g/dL / Pro: 5.5 g/dL / ALK PHOS: 117 U/L / ALT: 8 U/L / AST: 16 U/L / GGT: x             Culture - Blood (collected 07-27-18 @ 12:27)  Source: .Blood None  Preliminary Report (07-29-18 @ 01:01):    No growth to date.    Culture - Urine (collected 07-26-18 @ 14:52)  Source: .Urine Catheterized  Final Report (07-27-18 @ 22:25):    Culture grew 3 or more types of organisms which indicate    collection contamination; consider recollection only if clinically    indicated.            RADIOLOGY & ADDITIONAL TESTS:    ANTIBIOTICS:

## 2018-07-31 NOTE — CHART NOTE - NSCHARTNOTEFT_GEN_A_CORE
Registered Dietitian Follow-Up     Patient Profile Reviewed                           Yes [x]   No []     Nutrition History Previously Obtained        Yes [x]  No []       Pertinent Subjective Information: Pt. resting in bed during visit, currently NPO. Pt. reports episodes of vomiting after meals this weekend. He states he didn't feel nauseas, but felt like food was stuck in his stomach and it just "came out" a few hours later. Constipation improved- reports BM yesterday.      Pertinent Medical Interventions: Abdominal pain: - evidence of ileus vs partial SBO, KUB pending. pt. refused NGT. - Per general surgery no surgical intervention at this moment.  Chronic back pain 2/2 vertebral fracture; wean off opiates for partial SBO. DM2: - FS and insulin b/b. Elevated bleeding times: - not on anticoagulation, replete vit K when able to tolerate PO.      Diet order: NPO      Anthropometrics:  - Ht. 185.4cm   - Wt. no new weights documented  - %wt change  - BMI 22.3  - IBW 172lbs     Pertinent Lab Data: (7/31) RBC 3.86, Hg 9.3, hct 30.2, Cl 96, glu 59     Pertinent Meds: Protonix     Physical Findings:  - Appearance: alert&oriented  - GI function: abd pain, no other symptoms, last BM 7/30  - Tubes: no tubes noted  - Oral/Mouth cavity: denies symptoms  - Skin: multiple pressure ulcers: stage IV to sacral spine and R and L ischium, stage III to b/l heel     Nutrition Requirements (from RD note on 7/27)  Weight Used: dosing 76.6kg  Estimated Energy Needs    Continue [x]  Adjust [] 1990-2487kcal  Adjusted Energy Recommendations:   kcal/day        Estimated Protein Needs    Continue []  Adjust [x] 92-115g (1.2-1.5g/kg CBW) for pressure ulcers (considered recent SILVINA)  Adjusted Protein Recommendations:   gm/day        Estimated Fluid Needs        Continue []  Adjust [x] 1ml/kcal or per LIP  Adjusted Fluid Recommendations:   mL/day     Nutrient Intake: ~75% when on a diet, currently NPO        [] Previous Nutrition Diagnosis: Increased nutrient needs             [x] Ongoing          [] Resolved      Nutrition Intervention: meals and snacks, vitamin and mineral supplement    Rec: when medically feasible advance diet to GI soft with carb consistent (if blood glucose elevated, pt. with hx of DM), order vit C 500mg BID, Zinc sulfate 220mg daily (for total of 10-14 days). When diet advanced order Rene BID- if pt. remains non septic.        Goal/Expected Outcome: In 3 days pt. to advance to and tolerate solid diet with % PO and supplement intake      Indicator/Monitoring: diet order, energy intake, body composition, NFPF, electrolyte/renal profile, glucose profile Registered Dietitian Follow-Up     Patient Profile Reviewed                           Yes [x]   No []     Nutrition History Previously Obtained        Yes [x]  No []       Pertinent Subjective Information: Pt. resting in bed during visit, currently NPO. Pt. reports episodes of vomiting after meals this weekend. He states he didn't feel nauseas, but felt like food was stuck in his stomach and it just "came out" a few hours later. Constipation improved- reports BM yesterday. Pt. doesn't like any nutritional supplements (shakes), but inclined to try powder supplement.      Pertinent Medical Interventions: Abdominal pain: - evidence of ileus vs partial SBO, KUB pending. pt. refused NGT. - Per general surgery no surgical intervention at this moment.  Chronic back pain 2/2 vertebral fracture; wean off opiates for partial SBO. DM2: - FS and insulin b/b. Elevated bleeding times: - not on anticoagulation, replete vit K when able to tolerate PO.      Diet order: NPO      Anthropometrics:  - Ht. 185.4cm   - Wt. no new weights documented  - %wt change  - BMI 22.3  - IBW 172lbs     Pertinent Lab Data: (7/31) RBC 3.86, Hg 9.3, hct 30.2, Cl 96, glu 59     Pertinent Meds: Protonix     Physical Findings:  - Appearance: alert&oriented  - GI function: abd pain, no other symptoms, last BM 7/30  - Tubes: no tubes noted  - Oral/Mouth cavity: denies symptoms  - Skin: multiple pressure ulcers: stage IV to sacral spine and R and L ischium, stage III to b/l heel     Nutrition Requirements (from RD note on 7/27)  Weight Used: dosing 76.6kg  Estimated Energy Needs    Continue [x]  Adjust [] 1990-2487kcal  Adjusted Energy Recommendations:   kcal/day        Estimated Protein Needs    Continue []  Adjust [x] 92-115g (1.2-1.5g/kg CBW) for pressure ulcers (considered recent SILVINA)  Adjusted Protein Recommendations:   gm/day        Estimated Fluid Needs        Continue []  Adjust [x] 1ml/kcal or per LIP  Adjusted Fluid Recommendations:   mL/day     Nutrient Intake: ~75% when on a diet, currently NPO        [] Previous Nutrition Diagnosis: Increased nutrient needs             [x] Ongoing          [] Resolved      Nutrition Intervention: meals and snacks, vitamin and mineral supplement    Rec: when medically feasible advance diet to GI soft with carb consistent (if blood glucose elevated, pt. with hx of DM), order vit C 500mg BID, Zinc sulfate 220mg daily (for total of 10-14 days). When diet advanced order Rene BID- if pt. remains non septic.        Goal/Expected Outcome: In 3 days pt. to advance to and tolerate solid diet with % PO and supplement intake      Indicator/Monitoring: diet order, energy intake, body composition, NFPF, electrolyte/renal profile, glucose profile

## 2018-08-01 LAB
ALBUMIN SERPL ELPH-MCNC: 1.8 G/DL — LOW (ref 3.5–5.2)
ALP SERPL-CCNC: 95 U/L — SIGNIFICANT CHANGE UP (ref 30–115)
ALT FLD-CCNC: 8 U/L — SIGNIFICANT CHANGE UP (ref 0–41)
ANION GAP SERPL CALC-SCNC: 13 MMOL/L — SIGNIFICANT CHANGE UP (ref 7–14)
APTT BLD: 41.3 SEC — HIGH (ref 27–39.2)
AST SERPL-CCNC: 19 U/L — SIGNIFICANT CHANGE UP (ref 0–41)
BILIRUB SERPL-MCNC: 0.3 MG/DL — SIGNIFICANT CHANGE UP (ref 0.2–1.2)
BUN SERPL-MCNC: 13 MG/DL — SIGNIFICANT CHANGE UP (ref 10–20)
CALCIUM SERPL-MCNC: 7.8 MG/DL — LOW (ref 8.5–10.1)
CHLORIDE SERPL-SCNC: 92 MMOL/L — LOW (ref 98–110)
CO2 SERPL-SCNC: 27 MMOL/L — SIGNIFICANT CHANGE UP (ref 17–32)
CREAT SERPL-MCNC: 0.8 MG/DL — SIGNIFICANT CHANGE UP (ref 0.7–1.5)
GLUCOSE SERPL-MCNC: 38 MG/DL — CRITICAL LOW (ref 70–99)
HCT VFR BLD CALC: 28.2 % — LOW (ref 42–52)
HGB BLD-MCNC: 8.8 G/DL — LOW (ref 14–18)
INR BLD: 2.2 RATIO — HIGH (ref 0.65–1.3)
MCHC RBC-ENTMCNC: 24.3 PG — LOW (ref 27–31)
MCHC RBC-ENTMCNC: 31.2 G/DL — LOW (ref 32–37)
MCV RBC AUTO: 77.9 FL — LOW (ref 80–94)
NRBC # BLD: 0 /100 WBCS — SIGNIFICANT CHANGE UP (ref 0–0)
PLATELET # BLD AUTO: 329 K/UL — SIGNIFICANT CHANGE UP (ref 130–400)
POTASSIUM SERPL-MCNC: 4.1 MMOL/L — SIGNIFICANT CHANGE UP (ref 3.5–5)
POTASSIUM SERPL-SCNC: 4.1 MMOL/L — SIGNIFICANT CHANGE UP (ref 3.5–5)
PROT SERPL-MCNC: 5 G/DL — LOW (ref 6–8)
PROTHROM AB SERPL-ACNC: 23.6 SEC — HIGH (ref 9.95–12.87)
RBC # BLD: 3.62 M/UL — LOW (ref 4.7–6.1)
RBC # FLD: 15.6 % — HIGH (ref 11.5–14.5)
SODIUM SERPL-SCNC: 132 MMOL/L — LOW (ref 135–146)
WBC # BLD: 16.54 K/UL — HIGH (ref 4.8–10.8)
WBC # FLD AUTO: 16.54 K/UL — HIGH (ref 4.8–10.8)

## 2018-08-01 PROCEDURE — 99222 1ST HOSP IP/OBS MODERATE 55: CPT

## 2018-08-01 RX ORDER — SENNA PLUS 8.6 MG/1
2 TABLET ORAL AT BEDTIME
Qty: 0 | Refills: 0 | Status: DISCONTINUED | OUTPATIENT
Start: 2018-08-01 | End: 2018-08-05

## 2018-08-01 RX ORDER — PHYTONADIONE (VIT K1) 5 MG
5 TABLET ORAL ONCE
Qty: 0 | Refills: 0 | Status: DISCONTINUED | OUTPATIENT
Start: 2018-08-01 | End: 2018-08-01

## 2018-08-01 RX ORDER — DEXTROSE 50 % IN WATER 50 %
50 SYRINGE (ML) INTRAVENOUS ONCE
Qty: 0 | Refills: 0 | Status: COMPLETED | OUTPATIENT
Start: 2018-08-01 | End: 2018-08-01

## 2018-08-01 RX ORDER — SODIUM CHLORIDE 9 MG/ML
1000 INJECTION, SOLUTION INTRAVENOUS
Qty: 0 | Refills: 0 | Status: DISCONTINUED | OUTPATIENT
Start: 2018-08-01 | End: 2018-08-03

## 2018-08-01 RX ORDER — PHYTONADIONE (VIT K1) 5 MG
2.5 TABLET ORAL ONCE
Qty: 0 | Refills: 0 | Status: COMPLETED | OUTPATIENT
Start: 2018-08-01 | End: 2018-08-01

## 2018-08-01 RX ORDER — AMPICILLIN SODIUM AND SULBACTAM SODIUM 250; 125 MG/ML; MG/ML
INJECTION, POWDER, FOR SUSPENSION INTRAMUSCULAR; INTRAVENOUS
Qty: 0 | Refills: 0 | Status: DISCONTINUED | OUTPATIENT
Start: 2018-08-01 | End: 2018-08-03

## 2018-08-01 RX ORDER — AMPICILLIN SODIUM AND SULBACTAM SODIUM 250; 125 MG/ML; MG/ML
3 INJECTION, POWDER, FOR SUSPENSION INTRAMUSCULAR; INTRAVENOUS ONCE
Qty: 0 | Refills: 0 | Status: COMPLETED | OUTPATIENT
Start: 2018-08-01 | End: 2018-08-01

## 2018-08-01 RX ORDER — AMPICILLIN SODIUM AND SULBACTAM SODIUM 250; 125 MG/ML; MG/ML
3 INJECTION, POWDER, FOR SUSPENSION INTRAMUSCULAR; INTRAVENOUS EVERY 6 HOURS
Qty: 0 | Refills: 0 | Status: DISCONTINUED | OUTPATIENT
Start: 2018-08-01 | End: 2018-08-03

## 2018-08-01 RX ADMIN — Medication 100.5 MILLIGRAM(S): at 16:16

## 2018-08-01 RX ADMIN — Medication 10 MILLIGRAM(S): at 05:53

## 2018-08-01 RX ADMIN — OXYCODONE HYDROCHLORIDE 30 MILLIGRAM(S): 5 TABLET ORAL at 19:56

## 2018-08-01 RX ADMIN — METHADONE HYDROCHLORIDE 80 MILLIGRAM(S): 40 TABLET ORAL at 19:57

## 2018-08-01 RX ADMIN — Medication 10 MILLIGRAM(S): at 19:44

## 2018-08-01 RX ADMIN — GABAPENTIN 800 MILLIGRAM(S): 400 CAPSULE ORAL at 13:28

## 2018-08-01 RX ADMIN — OXYCODONE HYDROCHLORIDE 30 MILLIGRAM(S): 5 TABLET ORAL at 13:24

## 2018-08-01 RX ADMIN — AMPICILLIN SODIUM AND SULBACTAM SODIUM 200 GRAM(S): 250; 125 INJECTION, POWDER, FOR SUSPENSION INTRAMUSCULAR; INTRAVENOUS at 07:58

## 2018-08-01 RX ADMIN — SODIUM CHLORIDE 50 MILLILITER(S): 9 INJECTION, SOLUTION INTRAVENOUS at 07:58

## 2018-08-01 RX ADMIN — GABAPENTIN 800 MILLIGRAM(S): 400 CAPSULE ORAL at 21:10

## 2018-08-01 RX ADMIN — AMPICILLIN SODIUM AND SULBACTAM SODIUM 200 GRAM(S): 250; 125 INJECTION, POWDER, FOR SUSPENSION INTRAMUSCULAR; INTRAVENOUS at 23:44

## 2018-08-01 RX ADMIN — OXYCODONE HYDROCHLORIDE 30 MILLIGRAM(S): 5 TABLET ORAL at 20:30

## 2018-08-01 RX ADMIN — Medication 50 MILLILITER(S): at 21:23

## 2018-08-01 RX ADMIN — OXYCODONE HYDROCHLORIDE 30 MILLIGRAM(S): 5 TABLET ORAL at 05:52

## 2018-08-01 RX ADMIN — AMPICILLIN SODIUM AND SULBACTAM SODIUM 200 GRAM(S): 250; 125 INJECTION, POWDER, FOR SUSPENSION INTRAMUSCULAR; INTRAVENOUS at 19:42

## 2018-08-01 RX ADMIN — GABAPENTIN 800 MILLIGRAM(S): 400 CAPSULE ORAL at 05:53

## 2018-08-01 RX ADMIN — PANTOPRAZOLE SODIUM 40 MILLIGRAM(S): 20 TABLET, DELAYED RELEASE ORAL at 12:06

## 2018-08-01 RX ADMIN — METHADONE HYDROCHLORIDE 80 MILLIGRAM(S): 40 TABLET ORAL at 06:07

## 2018-08-01 RX ADMIN — METHADONE HYDROCHLORIDE 80 MILLIGRAM(S): 40 TABLET ORAL at 12:05

## 2018-08-01 RX ADMIN — OXYCODONE HYDROCHLORIDE 30 MILLIGRAM(S): 5 TABLET ORAL at 12:05

## 2018-08-01 RX ADMIN — Medication 50 MILLILITER(S): at 17:16

## 2018-08-01 RX ADMIN — Medication 1 ENEMA: at 21:08

## 2018-08-01 RX ADMIN — Medication 10 MILLIGRAM(S): at 19:57

## 2018-08-01 RX ADMIN — SENNA PLUS 2 TABLET(S): 8.6 TABLET ORAL at 21:09

## 2018-08-01 RX ADMIN — OXYCODONE HYDROCHLORIDE 30 MILLIGRAM(S): 5 TABLET ORAL at 06:22

## 2018-08-01 RX ADMIN — AMPICILLIN SODIUM AND SULBACTAM SODIUM 200 GRAM(S): 250; 125 INJECTION, POWDER, FOR SUSPENSION INTRAMUSCULAR; INTRAVENOUS at 12:07

## 2018-08-01 NOTE — PROGRESS NOTE ADULT - ASSESSMENT
stage 4 pressure sore heels--> healing-->    rec: soap and water qd--> betadine and gauze and ABD pads and kerlex gauze wrap dressing change bid    No surgery needed

## 2018-08-01 NOTE — PROGRESS NOTE ADULT - ASSESSMENT
56 yo M with PMHx DM2, functional paraplegia 2/2 previous lumbar trauma (able to stand but cannot walk), subsequent sacral decubiti and heel ulcers, suprapubic cath, chronic opiate dependence here with paralytic ileus likely provoked by opiate use    # Ileus  nonadherent with NPO  may have already incurred an aspiration pneumonitis  unreliable historian- will need to confirm BM's with nursing staff  continue with stool softeners and fleet enemas  empiric trial of 2 days of Relistor  if no significant BM tomorrow -> manual disimpaction  serial abdominal exams; surgical f/u    #Opiate dependence  performed ISTOP and resumed outpatient regimen  discussed case with pain management  reasonable to decrease outpatient regimen by 10-20%/ day without fear of active withdrawal  will also repeat EKG for qtc interval while on these very high doses of methadone    #Aspiration pneumonitis  monitor O2 requirements  empirically started on unasyn/ will continue  monitor for fevers/ leukocytosis  encourage incentive spirometry    #chronic sacral and heel ulcers POA  burn f/u/ wound care    DVT ppx  high risk 2/2 poor adherence/ comorbidities

## 2018-08-01 NOTE — PROGRESS NOTE ADULT - SUBJECTIVE AND OBJECTIVE BOX
24H events:    Patient is a 57y old Male who presents with a chief complaint of Abdominal pain, not passing gas or BMs.     Today is hospital day 6d. Pulled his NG tube over night. Nausea improved this morning. Still quite distended and diffusely tender. CT showed no clear transition point. Possible aspiration pneumonia with fever of 38 sustained for 1 hour, started on Unasyn. Surgery saw him. Likely ileus     PAST MEDICAL & SURGICAL HISTORY  Suprapubic catheter  Pressure ulcer  Diabetes  Lumbar compression fracture  S/P debridement  Toe amputation status, right  H/O hernia repair    SOCIAL HISTORY:  Negative for smoking/alcohol/drug use.     ALLERGIES:  sulfamethizole (Unknown)    MEDICATIONS:  STANDING MEDICATIONS  ALPRAZolam 2 milliGRAM(s) Oral daily  ampicillin/sulbactam  IVPB 3 Gram(s) IV Intermittent every 6 hours  ampicillin/sulbactam  IVPB      diazepam    Tablet 10 milliGRAM(s) Oral two times a day  gabapentin 800 milliGRAM(s) Oral three times a day  lactated ringers. 1000 milliLiter(s) IV Continuous <Continuous>  methadone    Tablet 80 milliGRAM(s) Oral four times a day  oxybutynin 10 milliGRAM(s) Oral two times a day  oxyCODONE    IR 30 milliGRAM(s) Oral four times a day  pantoprazole  Injectable 40 milliGRAM(s) IV Push daily    PRN MEDICATIONS  acetaminophen   Tablet 650 milliGRAM(s) Oral every 6 hours PRN    VITALS:   T(F): 98.5  HR: 104  BP: 131/68  RR: 18  SpO2: 95%    LABS:                        9.3    7.30  )-----------( 336      ( 31 Jul 2018 08:27 )             30.2     07-31    136  |  96<L>  |  13  ----------------------------<  59<L>  4.2   |  27  |  0.7    Ca    8.0<L>      31 Jul 2018 08:27    TPro  5.2<L>  /  Alb  2.1<L>  /  TBili  0.2  /  DBili  x   /  AST  15  /  ALT  7   /  AlkPhos  103  07-31    PT/INR - ( 31 Jul 2018 12:15 )   PT: 20.60 sec;   INR: 1.92 ratio         PTT - ( 31 Jul 2018 12:15 )  PTT:40.6 sec              RADIOLOGY:    PHYSICAL EXAM:  General: NAD  HEENT:  SUJIT              Lymph Nodes: No cervical LN   Lungs: Bilateral BS  Cardiovascular: Regular  Abdomen: Distended, diffusely tender   Extremities: No clubbing  Skin: Warm, no rashes   Neurological: BLE weakness     Assessment and Recommendation:   · Assessment		  Assessment:    # Abdominal pain:  - Opioid dependence  - evidence of ileus vs partial SBO  - NPO, IVF  - CT showed no clear transition point  -Pulled NP over night. Will replace if gets distended again   - Per general surgery no surgical intervention at this moment.      #Aspiration pneumonia vs pneumonitis   - One episode of fever over night  - Unasyn     # Chronic back pain 2/2 vertebral fracture:  - dialudid IV PRN, wean off opiates for partial SBO      # HTN:  - resume meds when stable  - SP in the 110s    # DM2:  - FS and insulin b/b    # elevated bleeding times:;  - not on anticoagulation  - in previous admissions INR 1.5, from 1.0 last year  - possibly nutritional deficiency  - trend INR PTT  - replete vit K when able to tolerate PO intake, FFP if bleeding    # DVT ppx: INR therapeutic, start HSC when INR < 1.5

## 2018-08-01 NOTE — PROGRESS NOTE ADULT - SUBJECTIVE AND OBJECTIVE BOX
MARGE BELLA  57y Male   [id number]  Patient is a 57y old  Male who presents with a chief complaint of Abdominal pain  rule sbo vs ileus    PAST MEDICAL & SURGICAL HISTORY:  Suprapubic catheter  Pressure ulcer  Diabetes  Lumbar compression fracture  S/P debridement  Toe amputation status, right  H/O hernia repair      Events of the Last 24h:ct scan , ngt placed 550 cc   Vital Signs Last 24 Hrs  T(C): 37.5 (31 Jul 2018 21:42), Max: 38 (31 Jul 2018 21:15)  T(F): 99.5 (31 Jul 2018 21:42), Max: 100.4 (31 Jul 2018 21:15)  HR: 92 (31 Jul 2018 21:15) (92 - 96)  BP: 111/64 (31 Jul 2018 21:15) (100/66 - 118/75)  BP(mean): --  RR: 18 (31 Jul 2018 21:15) (18 - 18)  SpO2: 95% (31 Jul 2018 20:00) (95% - 95%)        Diet, NPO:   Except Medications (07-30-18 @ 16:12)      I&O's Summary    30 Jul 2018 07:01  -  31 Jul 2018 07:00  --------------------------------------------------------  IN: 0 mL / OUT: 1600 mL / NET: -1600 mL    31 Jul 2018 07:01  -  01 Aug 2018 01:18  --------------------------------------------------------  IN: 450 mL / OUT: 0 mL / NET: 450 mL     I&O's Detail    30 Jul 2018 07:01  -  31 Jul 2018 07:00  --------------------------------------------------------  IN:  Total IN: 0 mL    OUT:    Emesis: 1600 mL  Total OUT: 1600 mL    Total NET: -1600 mL      31 Jul 2018 07:01  -  01 Aug 2018 01:18  --------------------------------------------------------  IN:    lactated ringers.: 450 mL  Total IN: 450 mL    OUT:  Total OUT: 0 mL    Total NET: 450 mL          MEDICATIONS  (STANDING):  ALPRAZolam 2 milliGRAM(s) Oral daily  diazepam    Tablet 10 milliGRAM(s) Oral two times a day  gabapentin 800 milliGRAM(s) Oral three times a day  lactated ringers. 1000 milliLiter(s) (50 mL/Hr) IV Continuous <Continuous>  methadone    Tablet 80 milliGRAM(s) Oral four times a day  oxybutynin 10 milliGRAM(s) Oral two times a day  oxyCODONE    IR 30 milliGRAM(s) Oral four times a day  pantoprazole  Injectable 40 milliGRAM(s) IV Push daily    MEDICATIONS  (PRN):  acetaminophen   Tablet 650 milliGRAM(s) Oral every 6 hours PRN For Temp greater than 38 C (100.4 F)      PHYSICAL EXAM:    GENERAL: NAD    HEENT: NCAT    CHEST/LUNGS: CTAB    HEART: RRR,  No murmurs, rubs, or gallops    ABDOMEN: distented , non tender , positive bowel sounds     EXTREMITIES:  FROM, No clubbing, cyanosis, or edema, palpable pulse    NEURO: No focal neurological deficits    SKIN: No rashes or lesions    INCISION/WOUNDS:                          9.3    7.30  )-----------( 336      ( 31 Jul 2018 08:27 )             30.2        CBC Full  -  ( 31 Jul 2018 08:27 )  WBC Count : 7.30 K/uL  Hemoglobin : 9.3 g/dL  Hematocrit : 30.2 %  Platelet Count - Automated : 336 K/uL  Mean Cell Volume : 78.2 fL  Mean Cell Hemoglobin : 24.1 pg  Mean Cell Hemoglobin Concentration : 30.8 g/dL  Auto Neutrophil # : x  Auto Lymphocyte # : x  Auto Monocyte # : x  Auto Eosinophil # : x  Auto Basophil # : x  Auto Neutrophil % : x  Auto Lymphocyte % : x  Auto Monocyte % : x  Auto Eosinophil % : x  Auto Basophil % : x               136   |  96    |  13                 Ca: 8.0    BMP:   ----------------------------< 59     Mg: x     (07-31-18 @ 08:27)             4.2    |  27    | 0.7                Ph: x        LFT:     TPro: 5.2 / Alb: 2.1 / TBili: 0.2 / DBili: x / AST: 15 / ALT: 7 / AlkPhos: 103   (07-31-18 @ 08:27)    LIVER FUNCTIONS - ( 31 Jul 2018 08:27 )  Alb: 2.1 g/dL / Pro: 5.2 g/dL / ALK PHOS: 103 U/L / ALT: 7 U/L / AST: 15 U/L / GGT: x           PT/INR - ( 31 Jul 2018 12:15 )   PT: 20.60 sec;   INR: 1.92 ratio         PTT - ( 31 Jul 2018 12:15 )  PTT:40.6 sec      < from: CT Abdomen and Pelvis w/ Oral Cont and w/ IV Cont (08.01.18 @ 00:46) >    Dilated loops of small bowel measuring up to 3.8 cm without definite   transition point, suggestive of paralytic ileus.        < end of copied text >

## 2018-08-01 NOTE — PROGRESS NOTE ADULT - SUBJECTIVE AND OBJECTIVE BOX
MARGE BELLA  57y  Male      Patient is a 57y old  Male who presents with a chief complaint of Abdominal pain (27 Jul 2018 01:10)      INTERVAL HPI/OVERNIGHT EVENTS: removed his ngt. per reports ate and vomited despite recommendations. claims that he is passing gas and stool      REVIEW OF SYSTEMS:  as above  All other review of systems negative    T(C): 37 (08-01-18 @ 12:10), Max: 38 (07-31-18 @ 21:15)  HR: 93 (08-01-18 @ 12:10) (92 - 104)  BP: 102/66 (08-01-18 @ 12:10) (102/66 - 131/68)  RR: 17 (08-01-18 @ 12:10) (17 - 18)  SpO2: 93% (08-01-18 @ 12:10) (93% - 95%)  Wt(kg): --Vital Signs Last 24 Hrs  T(C): 37 (01 Aug 2018 12:10), Max: 38 (31 Jul 2018 21:15)  T(F): 98.6 (01 Aug 2018 12:10), Max: 100.4 (31 Jul 2018 21:15)  HR: 93 (01 Aug 2018 12:10) (92 - 104)  BP: 102/66 (01 Aug 2018 12:10) (102/66 - 131/68)  BP(mean): 73 (01 Aug 2018 12:10) (73 - 73)  RR: 17 (01 Aug 2018 12:10) (17 - 18)  SpO2: 93% (01 Aug 2018 12:10) (93% - 95%)      07-31-18 @ 07:01  -  08-01-18 @ 07:00  --------------------------------------------------------  IN: 500 mL / OUT: 800 mL / NET: -300 mL    08-01-18 @ 07:01  -  08-01-18 @ 19:02  --------------------------------------------------------  IN: 600 mL / OUT: 1200 mL / NET: -600 mL        PHYSICAL EXAM:  GENERAL: NAD  PSYCH: no agitation, odd affect  NERVOUS SYSTEM:  Alert & Oriented X3, no new focal deficits  PULMONARY: Clear to percussion bilaterally; No rales, rhonchi, wheezing, or rubs  CARDIOVASCULAR: Regular rate and rhythm; No murmurs, rubs, or gallops  GI: distended NT   EXTREMITIES:  functional paraplegia of b/l LE's    Consultant(s) Notes Reviewed:  [x ] YES  [ ] NO    Discussed with Consultants/Other Providers [ x] YES     LABS                          8.8    16.54 )-----------( 329      ( 01 Aug 2018 09:09 )             28.2     08-01    132<L>  |  92<L>  |  13  ----------------------------<  38<LL>  4.1   |  27  |  0.8    Ca    7.8<L>      01 Aug 2018 09:09    TPro  5.0<L>  /  Alb  1.8<L>  /  TBili  0.3  /  DBili  x   /  AST  19  /  ALT  8   /  AlkPhos  95  08-01        PT/INR - ( 01 Aug 2018 09:09 )   PT: 23.60 sec;   INR: 2.20 ratio         PTT - ( 01 Aug 2018 09:09 )  PTT:41.3 sec  Lactate Trend        CAPILLARY BLOOD GLUCOSE  53 (01 Aug 2018 16:55)            RADIOLOGY & ADDITIONAL TESTS:    Imaging Personally Reviewed:  [ ] YES  [ ] NO    HEALTH ISSUES - PROBLEM Dx:

## 2018-08-01 NOTE — PROGRESS NOTE ADULT - ASSESSMENT
keep ngt in place, monitor outpt  paralytic ileus keep ngt in place, monitor outpt- once starts having bowel movements remove ngt  paralytic ileus- stop opioids

## 2018-08-02 ENCOUNTER — APPOINTMENT (OUTPATIENT)
Dept: BURN CARE | Facility: CLINIC | Age: 58
End: 2018-08-02

## 2018-08-02 LAB
ALBUMIN SERPL ELPH-MCNC: 1.8 G/DL — LOW (ref 3.5–5.2)
ALBUMIN SERPL ELPH-MCNC: 1.9 G/DL — LOW (ref 3.5–5.2)
ALP SERPL-CCNC: 100 U/L — SIGNIFICANT CHANGE UP (ref 30–115)
ALP SERPL-CCNC: 104 U/L — SIGNIFICANT CHANGE UP (ref 30–115)
ALT FLD-CCNC: 10 U/L — SIGNIFICANT CHANGE UP (ref 0–41)
ALT FLD-CCNC: 13 U/L — SIGNIFICANT CHANGE UP (ref 0–41)
ANION GAP SERPL CALC-SCNC: 12 MMOL/L — SIGNIFICANT CHANGE UP (ref 7–14)
ANION GAP SERPL CALC-SCNC: 15 MMOL/L — HIGH (ref 7–14)
APTT BLD: 40.9 SEC — HIGH (ref 27–39.2)
AST SERPL-CCNC: 24 U/L — SIGNIFICANT CHANGE UP (ref 0–41)
AST SERPL-CCNC: 38 U/L — SIGNIFICANT CHANGE UP (ref 0–41)
BILIRUB SERPL-MCNC: 0.3 MG/DL — SIGNIFICANT CHANGE UP (ref 0.2–1.2)
BILIRUB SERPL-MCNC: 0.4 MG/DL — SIGNIFICANT CHANGE UP (ref 0.2–1.2)
BUN SERPL-MCNC: 15 MG/DL — SIGNIFICANT CHANGE UP (ref 10–20)
BUN SERPL-MCNC: 15 MG/DL — SIGNIFICANT CHANGE UP (ref 10–20)
CALCIUM SERPL-MCNC: 7.5 MG/DL — LOW (ref 8.5–10.1)
CALCIUM SERPL-MCNC: 7.8 MG/DL — LOW (ref 8.5–10.1)
CHLORIDE SERPL-SCNC: 96 MMOL/L — LOW (ref 98–110)
CHLORIDE SERPL-SCNC: 96 MMOL/L — LOW (ref 98–110)
CO2 SERPL-SCNC: 24 MMOL/L — SIGNIFICANT CHANGE UP (ref 17–32)
CO2 SERPL-SCNC: 28 MMOL/L — SIGNIFICANT CHANGE UP (ref 17–32)
CREAT SERPL-MCNC: 0.9 MG/DL — SIGNIFICANT CHANGE UP (ref 0.7–1.5)
CREAT SERPL-MCNC: 1 MG/DL — SIGNIFICANT CHANGE UP (ref 0.7–1.5)
CULTURE RESULTS: SIGNIFICANT CHANGE UP
GLUCOSE SERPL-MCNC: 31 MG/DL — CRITICAL LOW (ref 70–99)
GLUCOSE SERPL-MCNC: 82 MG/DL — SIGNIFICANT CHANGE UP (ref 70–99)
HCT VFR BLD CALC: 28.1 % — LOW (ref 42–52)
HCT VFR BLD CALC: 28.4 % — LOW (ref 42–52)
HGB BLD-MCNC: 8.4 G/DL — LOW (ref 14–18)
HGB BLD-MCNC: 8.5 G/DL — LOW (ref 14–18)
INR BLD: 1.47 RATIO — HIGH (ref 0.65–1.3)
MCHC RBC-ENTMCNC: 23.8 PG — LOW (ref 27–31)
MCHC RBC-ENTMCNC: 23.8 PG — LOW (ref 27–31)
MCHC RBC-ENTMCNC: 29.9 G/DL — LOW (ref 32–37)
MCHC RBC-ENTMCNC: 29.9 G/DL — LOW (ref 32–37)
MCV RBC AUTO: 79.6 FL — LOW (ref 80–94)
MCV RBC AUTO: 79.6 FL — LOW (ref 80–94)
NRBC # BLD: 0 /100 WBCS — SIGNIFICANT CHANGE UP (ref 0–0)
NRBC # BLD: 0 /100 WBCS — SIGNIFICANT CHANGE UP (ref 0–0)
PLATELET # BLD AUTO: 279 K/UL — SIGNIFICANT CHANGE UP (ref 130–400)
PLATELET # BLD AUTO: 352 K/UL — SIGNIFICANT CHANGE UP (ref 130–400)
POTASSIUM SERPL-MCNC: 3.9 MMOL/L — SIGNIFICANT CHANGE UP (ref 3.5–5)
POTASSIUM SERPL-MCNC: 4 MMOL/L — SIGNIFICANT CHANGE UP (ref 3.5–5)
POTASSIUM SERPL-SCNC: 3.9 MMOL/L — SIGNIFICANT CHANGE UP (ref 3.5–5)
POTASSIUM SERPL-SCNC: 4 MMOL/L — SIGNIFICANT CHANGE UP (ref 3.5–5)
PROT SERPL-MCNC: 4.8 G/DL — LOW (ref 6–8)
PROT SERPL-MCNC: 4.9 G/DL — LOW (ref 6–8)
PROTHROM AB SERPL-ACNC: 15.8 SEC — HIGH (ref 9.95–12.87)
RBC # BLD: 3.53 M/UL — LOW (ref 4.7–6.1)
RBC # BLD: 3.57 M/UL — LOW (ref 4.7–6.1)
RBC # FLD: 15.8 % — HIGH (ref 11.5–14.5)
RBC # FLD: 15.9 % — HIGH (ref 11.5–14.5)
SODIUM SERPL-SCNC: 135 MMOL/L — SIGNIFICANT CHANGE UP (ref 135–146)
SODIUM SERPL-SCNC: 136 MMOL/L — SIGNIFICANT CHANGE UP (ref 135–146)
SPECIMEN SOURCE: SIGNIFICANT CHANGE UP
WBC # BLD: 15.24 K/UL — HIGH (ref 4.8–10.8)
WBC # BLD: 15.52 K/UL — HIGH (ref 4.8–10.8)
WBC # FLD AUTO: 15.24 K/UL — HIGH (ref 4.8–10.8)
WBC # FLD AUTO: 15.52 K/UL — HIGH (ref 4.8–10.8)

## 2018-08-02 RX ORDER — DEXTROSE 50 % IN WATER 50 %
50 SYRINGE (ML) INTRAVENOUS ONCE
Qty: 0 | Refills: 0 | Status: COMPLETED | OUTPATIENT
Start: 2018-08-02 | End: 2018-08-02

## 2018-08-02 RX ORDER — DEXTROSE 50 % IN WATER 50 %
50 SYRINGE (ML) INTRAVENOUS ONCE
Qty: 0 | Refills: 0 | Status: DISCONTINUED | OUTPATIENT
Start: 2018-08-02 | End: 2018-08-02

## 2018-08-02 RX ADMIN — Medication 2 MILLIGRAM(S): at 12:10

## 2018-08-02 RX ADMIN — METHADONE HYDROCHLORIDE 80 MILLIGRAM(S): 40 TABLET ORAL at 05:32

## 2018-08-02 RX ADMIN — Medication 10 MILLIGRAM(S): at 17:31

## 2018-08-02 RX ADMIN — GABAPENTIN 800 MILLIGRAM(S): 400 CAPSULE ORAL at 14:08

## 2018-08-02 RX ADMIN — Medication 50 MILLILITER(S): at 12:30

## 2018-08-02 RX ADMIN — METHADONE HYDROCHLORIDE 80 MILLIGRAM(S): 40 TABLET ORAL at 23:47

## 2018-08-02 RX ADMIN — OXYCODONE HYDROCHLORIDE 30 MILLIGRAM(S): 5 TABLET ORAL at 05:31

## 2018-08-02 RX ADMIN — OXYCODONE HYDROCHLORIDE 30 MILLIGRAM(S): 5 TABLET ORAL at 05:58

## 2018-08-02 RX ADMIN — OXYCODONE HYDROCHLORIDE 30 MILLIGRAM(S): 5 TABLET ORAL at 23:46

## 2018-08-02 RX ADMIN — AMPICILLIN SODIUM AND SULBACTAM SODIUM 200 GRAM(S): 250; 125 INJECTION, POWDER, FOR SUSPENSION INTRAMUSCULAR; INTRAVENOUS at 05:39

## 2018-08-02 RX ADMIN — OXYCODONE HYDROCHLORIDE 30 MILLIGRAM(S): 5 TABLET ORAL at 12:11

## 2018-08-02 RX ADMIN — Medication 50 MILLILITER(S): at 02:32

## 2018-08-02 RX ADMIN — AMPICILLIN SODIUM AND SULBACTAM SODIUM 200 GRAM(S): 250; 125 INJECTION, POWDER, FOR SUSPENSION INTRAMUSCULAR; INTRAVENOUS at 17:43

## 2018-08-02 RX ADMIN — SODIUM CHLORIDE 50 MILLILITER(S): 9 INJECTION, SOLUTION INTRAVENOUS at 07:26

## 2018-08-02 RX ADMIN — AMPICILLIN SODIUM AND SULBACTAM SODIUM 200 GRAM(S): 250; 125 INJECTION, POWDER, FOR SUSPENSION INTRAMUSCULAR; INTRAVENOUS at 23:47

## 2018-08-02 RX ADMIN — OXYCODONE HYDROCHLORIDE 30 MILLIGRAM(S): 5 TABLET ORAL at 17:29

## 2018-08-02 RX ADMIN — SENNA PLUS 2 TABLET(S): 8.6 TABLET ORAL at 21:35

## 2018-08-02 RX ADMIN — Medication 10 MILLIGRAM(S): at 17:30

## 2018-08-02 RX ADMIN — Medication 10 MILLIGRAM(S): at 05:32

## 2018-08-02 RX ADMIN — METHADONE HYDROCHLORIDE 80 MILLIGRAM(S): 40 TABLET ORAL at 17:29

## 2018-08-02 RX ADMIN — AMPICILLIN SODIUM AND SULBACTAM SODIUM 200 GRAM(S): 250; 125 INJECTION, POWDER, FOR SUSPENSION INTRAMUSCULAR; INTRAVENOUS at 12:10

## 2018-08-02 RX ADMIN — PANTOPRAZOLE SODIUM 40 MILLIGRAM(S): 20 TABLET, DELAYED RELEASE ORAL at 12:10

## 2018-08-02 RX ADMIN — METHADONE HYDROCHLORIDE 80 MILLIGRAM(S): 40 TABLET ORAL at 12:10

## 2018-08-02 RX ADMIN — OXYCODONE HYDROCHLORIDE 30 MILLIGRAM(S): 5 TABLET ORAL at 17:43

## 2018-08-02 RX ADMIN — OXYCODONE HYDROCHLORIDE 30 MILLIGRAM(S): 5 TABLET ORAL at 12:10

## 2018-08-02 RX ADMIN — GABAPENTIN 800 MILLIGRAM(S): 400 CAPSULE ORAL at 21:35

## 2018-08-02 RX ADMIN — GABAPENTIN 800 MILLIGRAM(S): 400 CAPSULE ORAL at 05:32

## 2018-08-02 NOTE — PROGRESS NOTE ADULT - SUBJECTIVE AND OBJECTIVE BOX
Progress Note: Trauma Surgery  Patient: MARGE BELLA , 57y (1960)Male   MRN: [id number]  Location: [address]  Visit: 07-26-18 Inpatient  Date: 08-02-18 @ 12:03  Hospital Day: 3  Post-op Day:     Procedure/Injury: abdominal pain, ileus vs. SBO  Events over 24h: Patient pulled out NG tube yesterday.  Passed 2 BMs with enema, states abdominal pain is resolving and he feels less distended.  Denies nausea, vomiting, fever, chills.  Bowel function: Bowel movement [ x ] Flatus [  ]    Vitals: T(F): 99 (08-02-18 @ 06:21), Max: 99.4 (08-01-18 @ 20:44)  HR: 95 (08-02-18 @ 06:21)  BP: 116/79 (08-02-18 @ 06:21) (94/54 - 116/79)  RR: 18 (08-02-18 @ 06:21)  SpO2: 93% (08-01-18 @ 12:10)    In:   08-01-18 @ 07:01  -  08-02-18 @ 07:00  --------------------------------------------------------  IN: 600 mL      Out:   08-01-18 @ 07:01  -  08-02-18 @ 07:00  --------------------------------------------------------  OUT:    Other: 1200 mL  Total OUT: 1200 mL        Net:   08-01-18 @ 07:01  -  08-02-18 @ 07:00  --------------------------------------------------------  NET: -600 mL      Diet: Diet, NPO:   Except Medications (07-30-18 @ 16:12)      Physical Examination:  General: NAD, AAOx3, GCS */15  HEENT: NCAT, SUJIT, WNL  Heart: S1 S2, No MRG RRR   Lungs: CTABL +BS Equal BL  Abdomen:  +BS, soft, nontender, nondistended  Skin: Warm/dry, Normal color, No jaundice.       Medications: [Standing]  ALPRAZolam 2 milliGRAM(s) Oral daily  ampicillin/sulbactam  IVPB 3 Gram(s) IV Intermittent every 6 hours  ampicillin/sulbactam  IVPB      dextrose 5% + sodium chloride 0.45%. 1000 milliLiter(s) (50 mL/Hr) IV Continuous <Continuous>  diazepam    Tablet 10 milliGRAM(s) Oral two times a day  gabapentin 800 milliGRAM(s) Oral three times a day  methadone    Tablet 80 milliGRAM(s) Oral four times a day  oxybutynin 10 milliGRAM(s) Oral two times a day  oxyCODONE    IR 30 milliGRAM(s) Oral four times a day  pantoprazole  Injectable 40 milliGRAM(s) IV Push daily  senna 2 Tablet(s) Oral at bedtime    Medications:[PRN]  acetaminophen   Tablet 650 milliGRAM(s) Oral every 6 hours PRN    Labs:                        8.8    16.54 )-----------( 329      ( 01 Aug 2018 09:09 )             28.2     08-01    132<L>  |  92<L>  |  13  ----------------------------<  38<LL>  4.1   |  27  |  0.8    Ca    7.8<L>      01 Aug 2018 09:09    TPro  5.0<L>  /  Alb  1.8<L>  /  TBili  0.3  /  DBili  x   /  AST  19  /  ALT  8   /  AlkPhos  95  08-01    LIVER FUNCTIONS - ( 01 Aug 2018 09:09 )  Alb: 1.8 g/dL / Pro: 5.0 g/dL / ALK PHOS: 95 U/L / ALT: 8 U/L / AST: 19 U/L / GGT: x           PT/INR - ( 01 Aug 2018 09:09 )   PT: 23.60 sec;   INR: 2.20 ratio         PTT - ( 01 Aug 2018 09:09 )  PTT:41.3 sec        Imaging:  None/24h        Dispo:  Seen and evaluated by PT: Yes [  ] No [  ]  Physiatry reccomendations: Home with VNS [  ] , SNF/STR [  ] , Inpatient rehab [  ] , No needs + D/C home [  ]  SW:     Date/Time: 08-02-18 @ 12:03

## 2018-08-02 NOTE — PROGRESS NOTE ADULT - ASSESSMENT
Assessment:  57y Male patient admitted with abdominal pain, ileus vs. SBO, with the above physical exam, labs, and imaging findings.    Plan:  -no surgical intervention at this time  -f/u tolerating regular feeds

## 2018-08-02 NOTE — PROGRESS NOTE ADULT - SUBJECTIVE AND OBJECTIVE BOX
24H events:    Patient is a 57y old Male who presents with a chief complaint of Abdominal pain.   Today is hospital day 7d. Ileus resolving. Having bowel movements and passing gas now. Pain improving. Having multiple episodes of hypoglycemia (bg 52-73), possible septic process. On Unasyn. F/U afternoon labs.     PAST MEDICAL & SURGICAL HISTORY  Suprapubic catheter  Pressure ulcer  Diabetes  Lumbar compression fracture  S/P debridement  Toe amputation status, right  H/O hernia repair    SOCIAL HISTORY:  Negative for smoking/alcohol/drug use.     ALLERGIES:  sulfamethizole (Unknown)    MEDICATIONS:  STANDING MEDICATIONS  ALPRAZolam 2 milliGRAM(s) Oral daily  ampicillin/sulbactam  IVPB 3 Gram(s) IV Intermittent every 6 hours  ampicillin/sulbactam  IVPB      dextrose 5% + sodium chloride 0.45%. 1000 milliLiter(s) IV Continuous <Continuous>  diazepam    Tablet 10 milliGRAM(s) Oral two times a day  gabapentin 800 milliGRAM(s) Oral three times a day  methadone    Tablet 80 milliGRAM(s) Oral four times a day  oxybutynin 10 milliGRAM(s) Oral two times a day  oxyCODONE    IR 30 milliGRAM(s) Oral four times a day  pantoprazole  Injectable 40 milliGRAM(s) IV Push daily  senna 2 Tablet(s) Oral at bedtime    PRN MEDICATIONS  acetaminophen   Tablet 650 milliGRAM(s) Oral every 6 hours PRN    VITALS:   T(F): 99  HR: 95  BP: 116/79  RR: 18  SpO2: 93%    LABS:                        8.8    16.54 )-----------( 329      ( 01 Aug 2018 09:09 )             28.2     08-01    132<L>  |  92<L>  |  13  ----------------------------<  38<LL>  4.1   |  27  |  0.8    Ca    7.8<L>      01 Aug 2018 09:09    TPro  5.0<L>  /  Alb  1.8<L>  /  TBili  0.3  /  DBili  x   /  AST  19  /  ALT  8   /  AlkPhos  95  08-01    PT/INR - ( 01 Aug 2018 09:09 )   PT: 23.60 sec;   INR: 2.20 ratio         PTT - ( 01 Aug 2018 09:09 )  PTT:41.3 sec              RADIOLOGY:    PHYSICAL EXAM:  General: NAD  HEENT:  SUJIT              Lymph Nodes: No cervical LN   Lungs: Bilateral BS  Cardiovascular: Regular  Abdomen: Distended, diffusely tender   Extremities: No clubbing  Skin: Warm, no rashes   Neurological: BLE weakness     Assessment and Recommendation:   · Assessment		  Assessment:    # Abdominal pain:  - Opioid dependence  - evidence of ileus vs partial SBO/ resolving  - Trial of regular feeds   - Per general surgery no surgical intervention at this moment.      #Aspiration pneumonia vs pneumonitis   - One episode of fever over night  - Unasyn   - monitor sugar for possible sepsis     # Chronic back pain 2/2 vertebral fracture:  - dialudid IV PRN, wean off opiates for partial SBO      # HTN:  - resume meds when stable  - SP in the 110s    # DM2:  - monitor FS    # elevated bleeding times:;  - not on anticoagulation  - in previous admissions INR 1.5, from 1.0 last year  - possibly nutritional deficiency  - trend INR PTT  - replete vit K when able to tolerate PO intake, FFP if bleeding    # DVT ppx: INR therapeutic, start HSC when INR < 1.5

## 2018-08-02 NOTE — PROGRESS NOTE ADULT - ATTENDING COMMENTS
58 yo M with PMHx DM2, functional paraplegia 2/2 previous lumbar trauma (able to stand but cannot walk), subsequent sacral decubiti and heel ulcers, suprapubic cath, chronic opiate dependence here with paralytic ileus likely provoked by opiate use    # Ileus 2/2 high dose opiates :   Resolved with some bowel rest.  Advance diet and watch another day to make sure doesn't recur.   Almost nil bowel sounds currently.  Been nonadherent with NPO recs earlier.     #Opiate dependence  performed ISTOP and resumed outpatient regimen  discussed case with pain management  reasonable to decrease outpatient regimen by 10-20%/ day without fear of active withdrawal. But since he has improved, will c/w same home dose.   will also repeat EKG for qtc interval while on these very high doses of methadone    #Aspiration pneumonia  empirically started on unasyn/ will continue  monitor for fevers/ leukocytosis  encourage incentive spirometry    #chronic sacral and heel ulcers POA  burn f/u/ wound care    # Recurrent Hypoglycemias :   on no insulin. Happens despite of him eating fine.   For now keep on D5 1/2 NS. Make sure its not d/t sepsis (doubt since no other markers of active sepsis). responding well to Abx.   Could be d/t his high doses of Methadone. Will bring this to attention of his pain doc.   Cannot decrease the methadone dose.   Will advise him to keep sugar pills in pocket even though Patient asymptomatic from this     D/c planning after changing to PO Abx tomorrow.     DVT ppx  high risk 2/2 poor adherence/ comorbidities
Continuing care discussed with pt and nursing staff.
Patient seen and examined independently of resident. Case discussed with housestaff, nursing and patient and pain management attending
Assessment and plan above were modified and discussed with residents, physician assistants, and nurses.

## 2018-08-03 ENCOUNTER — TRANSCRIPTION ENCOUNTER (OUTPATIENT)
Age: 58
End: 2018-08-03

## 2018-08-03 RX ORDER — OXYCODONE HYDROCHLORIDE 5 MG/1
1 TABLET ORAL
Qty: 0 | Refills: 0 | DISCHARGE
Start: 2018-08-03

## 2018-08-03 RX ORDER — PANTOPRAZOLE SODIUM 20 MG/1
40 TABLET, DELAYED RELEASE ORAL
Qty: 0 | Refills: 0 | COMMUNITY
Start: 2018-08-03

## 2018-08-03 RX ORDER — DEXTROSE 50 % IN WATER 50 %
2 SYRINGE (ML) INTRAVENOUS
Qty: 10 | Refills: 0 | OUTPATIENT
Start: 2018-08-03

## 2018-08-03 RX ORDER — PANTOPRAZOLE SODIUM 20 MG/1
1 TABLET, DELAYED RELEASE ORAL
Qty: 0 | Refills: 0 | COMMUNITY
Start: 2018-08-03

## 2018-08-03 RX ORDER — METRONIDAZOLE 500 MG
500 TABLET ORAL EVERY 8 HOURS
Qty: 0 | Refills: 0 | Status: DISCONTINUED | OUTPATIENT
Start: 2018-08-03 | End: 2018-08-05

## 2018-08-03 RX ORDER — OXYCODONE HYDROCHLORIDE 5 MG/1
1 TABLET ORAL
Qty: 0 | Refills: 0 | COMMUNITY

## 2018-08-03 RX ORDER — OXYCODONE HYDROCHLORIDE 5 MG/1
2 TABLET ORAL
Qty: 0 | Refills: 0 | COMMUNITY

## 2018-08-03 RX ADMIN — GABAPENTIN 800 MILLIGRAM(S): 400 CAPSULE ORAL at 06:25

## 2018-08-03 RX ADMIN — Medication 1 TABLET(S): at 20:35

## 2018-08-03 RX ADMIN — METHADONE HYDROCHLORIDE 80 MILLIGRAM(S): 40 TABLET ORAL at 19:07

## 2018-08-03 RX ADMIN — AMPICILLIN SODIUM AND SULBACTAM SODIUM 200 GRAM(S): 250; 125 INJECTION, POWDER, FOR SUSPENSION INTRAMUSCULAR; INTRAVENOUS at 12:40

## 2018-08-03 RX ADMIN — OXYCODONE HYDROCHLORIDE 30 MILLIGRAM(S): 5 TABLET ORAL at 06:34

## 2018-08-03 RX ADMIN — Medication 10 MILLIGRAM(S): at 06:25

## 2018-08-03 RX ADMIN — Medication 10 MILLIGRAM(S): at 19:06

## 2018-08-03 RX ADMIN — METHADONE HYDROCHLORIDE 80 MILLIGRAM(S): 40 TABLET ORAL at 06:34

## 2018-08-03 RX ADMIN — Medication 10 MILLIGRAM(S): at 06:34

## 2018-08-03 RX ADMIN — OXYCODONE HYDROCHLORIDE 30 MILLIGRAM(S): 5 TABLET ORAL at 11:52

## 2018-08-03 RX ADMIN — AMPICILLIN SODIUM AND SULBACTAM SODIUM 200 GRAM(S): 250; 125 INJECTION, POWDER, FOR SUSPENSION INTRAMUSCULAR; INTRAVENOUS at 06:16

## 2018-08-03 RX ADMIN — OXYCODONE HYDROCHLORIDE 30 MILLIGRAM(S): 5 TABLET ORAL at 11:22

## 2018-08-03 RX ADMIN — METHADONE HYDROCHLORIDE 80 MILLIGRAM(S): 40 TABLET ORAL at 11:21

## 2018-08-03 RX ADMIN — OXYCODONE HYDROCHLORIDE 30 MILLIGRAM(S): 5 TABLET ORAL at 19:07

## 2018-08-03 RX ADMIN — SODIUM CHLORIDE 50 MILLILITER(S): 9 INJECTION, SOLUTION INTRAVENOUS at 06:21

## 2018-08-03 RX ADMIN — OXYCODONE HYDROCHLORIDE 30 MILLIGRAM(S): 5 TABLET ORAL at 00:16

## 2018-08-03 RX ADMIN — GABAPENTIN 800 MILLIGRAM(S): 400 CAPSULE ORAL at 13:03

## 2018-08-03 RX ADMIN — GABAPENTIN 800 MILLIGRAM(S): 400 CAPSULE ORAL at 21:34

## 2018-08-03 RX ADMIN — Medication 2 MILLIGRAM(S): at 11:21

## 2018-08-03 RX ADMIN — OXYCODONE HYDROCHLORIDE 30 MILLIGRAM(S): 5 TABLET ORAL at 19:37

## 2018-08-03 RX ADMIN — SENNA PLUS 2 TABLET(S): 8.6 TABLET ORAL at 21:34

## 2018-08-03 RX ADMIN — Medication 500 MILLIGRAM(S): at 21:34

## 2018-08-03 RX ADMIN — PANTOPRAZOLE SODIUM 40 MILLIGRAM(S): 20 TABLET, DELAYED RELEASE ORAL at 11:22

## 2018-08-03 RX ADMIN — OXYCODONE HYDROCHLORIDE 30 MILLIGRAM(S): 5 TABLET ORAL at 07:04

## 2018-08-03 NOTE — DISCHARGE NOTE ADULT - CARE PLAN
Principal Discharge DX:	Ileus  Goal:	Secondary to pain medications  Assessment and plan of treatment:	Follow up with the pain management doctor and follow with the pain management doctor  Secondary Diagnosis:	Diabetes  Goal:	Control blood sugars  Assessment and plan of treatment:	Take medications regularly check finer stick glucose every day before meal  Secondary Diagnosis:	Aspiration pneumonia  Goal:	resolutions  Assessment and plan of treatment:	take medications as prescribed  Secondary Diagnosis:	Decubitus skin ulcer  Goal:	Wound care  Assessment and plan of treatment:	Proper wound care by [facility]

## 2018-08-03 NOTE — DISCHARGE NOTE ADULT - PATIENT PORTAL LINK FT
You can access the [facility], offered by [facility], by registering with the following website: [email or web address]

## 2018-08-03 NOTE — DISCHARGE NOTE ADULT - HOSPITAL COURSE
Patient is a 57y old Male who presents with a chief complaint of Abdominal pain. Was found to be in septic shock (elevated lactate, hypotension, leukocytosis). Ct scan showed ileus vs partial obstruction, Pt was seen by the surgery they recommend no intervention at this time. Course was complicated by the aspiration pneumonia and was treated with IV unasyn and was switched to PO augmentin and Metronidazole. He also had an episode of hypogycemia for which no source was identified, Patient is a 57y old Male who presents with a chief complaint of Abdominal pain. Was found to be in septic shock (elevated lactate, hypotension, leukocytosis). Ct scan showed ileus vs partial obstruction, Pt was seen by the surgery they recommend no intervention at this time. Course was complicated by the aspiration pneumonia and was treated with IV unasyn and was switched to PO augmentin and Metronidazole. He also had an episode of hypogycemia for which no source was identified,     Attending Note :   58 yo M with PMHx DM2, functional paraplegia 2/2 previous lumbar trauma (able to stand but cannot walk), subsequent sacral decubiti and heel ulcers, suprapubic cath, chronic opiate dependence here with paralytic ileus likely provoked by opiate use    # Ileus 2/2 high dose opiates :   Resolved with some bowel rest.  Advanced diet. Asymptomatic now.   Been nonadherent with NPO recs earlier.     #Opiate dependence  On high dose meds.   Since ileus resolved will let him be on his current dose  F/u Dr Garcia outside.     #Aspiration pneumonia  empirically started on unasyn.  Now on Augmentin.   Complete total 10 days.   Despite leukocytosis patient feels fine.     #chronic sacral and heel ulcers POA  burn f/u/ wound care    # Recurrent Hypoglycemias in the mornings :   on no insulin. Happens despite of him eating fine.   Could have been d/t sepsis but no other signs of active infection.   Could be d/t his high doses of Methadone. Will bring this to attention of his pain doc.   Cannot decrease the methadone dose.   Will advise him to keep sugar pills in pocket even though Patient asymptomatic from this   Stop using Januvia and Victoza at home and inform your PMD Dr. Urrutia of this change.    8/4 : But now sugars fine     #Diarrhea : (around 2 BM/day) x 3 days   No N/V/Abdo pain  Could be the expected response from him being ileus earlier.   wanted to r/o C Diff d/t recent antibiotic use but diarrhea has now resolved. Patient is a 57y old Male who presents with a chief complaint of Abdominal pain. Was found to be in septic shock (elevated lactate, hypotension, leukocytosis). Ct scan showed ileus vs partial obstruction, Pt was seen by the surgery they recommend no intervention at this time. Course was complicated by the aspiration pneumonia and was treated with IV unasyn and was switched to PO augmentin and Metronidazole. He also had an episode of hypogycemia for which no source was identified,     Attending Note :   56 yo M with PMHx DM2, functional paraplegia 2/2 previous lumbar trauma (able to stand but cannot walk), subsequent sacral decubiti and heel ulcers, suprapubic cath, chronic opiate dependence here with paralytic ileus likely provoked by opiate use    # Ileus 2/2 high dose opiates :   Resolved with some bowel rest.  Advanced diet. Asymptomatic now.   Been nonadherent with NPO recs earlier.     #Opiate dependence  On high dose meds.   Since ileus resolved will let him be on his current dose  F/u Dr Garcia outside.     #Aspiration pneumonia  empirically started on unasyn.  Now on Augmentin.   Complete total 10 days.   Despite leukocytosis patient feels fine.     #chronic sacral and heel ulcers POA  burn f/u/ wound care    # Recurrent Hypoglycemias in the mornings :   on no insulin. Happens despite of him eating fine.   Could have been d/t sepsis but no other signs of active infection.   Could be d/t his high doses of Methadone. Will bring this to attention of his pain doc.   Cannot decrease the methadone dose.   Will advise him to keep sugar pills in pocket even though Patient asymptomatic from this   Stop using Januvia and Victoza at home and inform your PMD Dr. Urrutia of this change.    8/4 : But now sugars fine     #Diarrhea : (around 2 BM/day) x 3 days   No N/V/Abdo pain  Could be the expected response from him being ileus earlier.   wanted to r/o C Diff d/t recent antibiotic use but diarrhea has now resolved.       Attending Addendum (8/7/18) :   Adding hereby that on admission patient had SIRS but per ID no real infectious source. Hence he did NOT have UTI or sepsis. He just had ileus d/t opiate meds causing the abdominal pain.

## 2018-08-03 NOTE — DISCHARGE NOTE ADULT - MEDICATION SUMMARY - MEDICATIONS TO STOP TAKING
I will STOP taking the medications listed below when I get home from the hospital:    Victoza 18 mg/3 mL subcutaneous solution  -- subcutaneous once a day    Januvia 100 mg oral tablet  -- 1 tab(s) by mouth once a day

## 2018-08-03 NOTE — PROGRESS NOTE ADULT - ASSESSMENT
58 yo M with PMHx DM2, functional paraplegia 2/2 previous lumbar trauma (able to stand but cannot walk), subsequent sacral decubiti and heel ulcers, suprapubic cath, chronic opiate dependence here with paralytic ileus likely provoked by opiate use    # Ileus 2/2 high dose opiates :   Resolved with some bowel rest.  Advanced diet. Asymptomatic now.   Almost nil bowel sounds currently.  Been nonadherent with NPO recs earlier.     #Opiate dependence  On high dose meds.   Since ileus resolved will let him be on his current dose  F/u Dr Garcia outside.     #Aspiration pneumonia  empirically started on unasyn.  Now on Augmentin.   Complete total 10 days.   Despite leukocytosis patient feels fine.     #chronic sacral and heel ulcers POA  burn f/u/ wound care    # Recurrent Hypoglycemias :   on no insulin. Happens despite of him eating fine.   Could have been d/t sepsis but no other signs of active infection.   Could be d/t his high doses of Methadone. Will bring this to attention of his pain doc.   Cannot decrease the methadone dose.   Will advise him to keep sugar pills in pocket even though Patient asymptomatic from this     D/c planning on PO Abx tomorrow.     DVT ppx  high risk 2/2 poor adherence/ comorbidities .

## 2018-08-03 NOTE — DISCHARGE NOTE ADULT - PROVIDER TOKENS
TOKEN:'[id number]',FREE:[LAST:[Pain management],PHONE:[(   )    -],FAX:[(   )    -]] TOKKATI:'[id number]',FREE:[LAST:[Pain management],PHONE:[(   )    -],FAX:[(   )    -]],FREE:[LAST:[velez],FIRST:[bella],PHONE:[(   )    -],FAX:[(   )    -]]

## 2018-08-03 NOTE — DISCHARGE NOTE ADULT - CARE PROVIDERS DIRECT ADDRESSES
[email or web address],DirectAddress_Unknown [email or web address],DirectAddress_Unknown,DirectAddress_Unknown

## 2018-08-03 NOTE — DISCHARGE NOTE ADULT - CARE PROVIDER_API CALL
Johnny Gooden (MD), Plastic Surgery  [address] [place] [address]  Phone: [phone]  Fax: [phone]    Pain management,   Phone: (   )    -  Fax: (   )    - Johnny Gooden), Plastic Surgery  [address] [place] [id number]  Phone: [phone]  Fax: [phone]    Pain management,   Phone: (   )    -  Fax: (   )    -    bella velez  Phone: (   )    -  Fax: (   )    -

## 2018-08-03 NOTE — DISCHARGE NOTE ADULT - PLAN OF CARE
Secondary to pain medications Follow up with the pain management doctor and follow with the pain management doctor Control blood sugars Take medications regularly check finer stick glucose every day before meal resolutions take medications as prescribed Wound care Proper wound care by [facility]

## 2018-08-03 NOTE — DISCHARGE NOTE ADULT - MEDICATION SUMMARY - MEDICATIONS TO TAKE
I will START or STAY ON the medications listed below when I get home from the hospital:    methadone  -- 80 milligram(s) by mouth 4 times a day  -- Indication: For Pain    oxyCODONE 30 mg oral tablet  -- 1 tab(s) by mouth 4 times a day  -- Indication: For Pain    lisinopril 20 mg oral tablet  -- 1 tab(s) by mouth once a day  -- Indication: For Htn    Valium 10 mg oral tablet  -- orally 2 times a day  -- Indication: For Anxiety    gabapentin 800 mg oral tablet  -- 1 tab(s) by mouth 3 times a day  -- Indication: For Pain    Xanax 2 mg oral tablet  -- orally once (at bedtime)  -- Indication: For Anxiety    glucose 4 g oral tablet, chewable  -- 2 tab(s) chewed once a day, As Needed For hypoglycemia attack  -- Chew tablets before swallowing    -- Indication: For Hypoglycemia prn    amoxicillin-clavulanate 500 mg-125 mg oral tablet  -- 1 tab(s) by mouth 2 times a day  -- Indication: For Aspiration pneumonia    pantoprazole 40 mg oral delayed release tablet  -- 1 tab(s) by mouth once a day  -- Indication: For Ppi    Testim 1% (50 mg/5 g) transdermal gel  -- 1 packet(s) by transdermal patch once a day (in the morning)  -- Indication: For endocrine    oxybutynin 10 mg/24 hr oral tablet, extended release  -- 1 tab(s) by mouth once a day  -- Indication: For urinary retention

## 2018-08-03 NOTE — CHART NOTE - NSCHARTNOTEFT_GEN_A_CORE
Registered Dietitian Follow-Up     Patient Profile Reviewed                           Yes [v]   No []     Nutrition History Previously Obtained        Yes [v]  No []       Pertinent Subjective Information:     Pertinent Medical Interventions: Per MD- ileus vs partial SBO resolving. Pt having bowel movements and passing gas now. Per general surgery no surgical intervention at this moment.  Pressure ulcers – burn following, no surgery needed.      Diet order: Regular      Anthropometrics:  - Ht.  - Wt.  - %wt change  - BMI  - IBW     Pertinent Lab Data: 8/2 WBC- 15.52 H/H- 8.4/28.2 Cl- 96     Pertinent Meds: IV abx, senna, pantoprazole, methadone, oxycodone     Physical Findings:  - Appearance:  - GI function:  - Tubes:  - Oral/Mouth cavity:  - Skin: stage IV to sacral spine , b/l ischium and b/l heel     Nutrition Requirements (Weight Used: dosing 76.6kg)    Estimated Energy Needs    Continue [v]  1990-2487kcal (MSJ x 1.2-1.5 AF) for pressure ulcers  Estimated Protein Needs    Continue [v]  92-115g (1.2-1.5g/kg act wt) for pressure ulcers (considered recent SILVINA)  Estimated Fluid Needs        Continue [v]   1ml/kcal or per LIP    Nutrient Intake:        [] Previous Nutrition Diagnosis: Increased nutrient needs             [x] Ongoing          [] Resolved    [] No active nutrition diagnosis identified at this time     Nutrition Diagnostic #1  Problem:  Etiology:  Statement:     Nutrition Diagnostic #2  Problem:  Etiology:  Statement:     Nutrition Intervention: Meal and snacks. Supplements.     Goal/Expected Outcome:     Indicator/Monitoring: Will monitor energy intake, diet order, labs, body composition, nutrition focused physical findings.     Recommended:  Please change diet to GI soft, CHO consistent. Please add vitamin C 500mg q 12hrs, MVI (w/ minerals), Zinc sulfate 220mg daily (x14 days). When stabilized will add Rene q 12 hrs. Registered Dietitian Follow-Up     Patient Profile Reviewed                           Yes [v]   No []     Nutrition History Previously Obtained        Yes [v]  No []       Pertinent Subjective Information: Pt reports very good appetite, eating 100% meals. Refusing PO supplements but requests chocolate milk w/ his meals. Pt had large BM last night.      Pertinent Medical Interventions: Per MD- ileus vs partial SBO resolving. Pt having bowel movements and passing gas now. Per general surgery no surgical intervention at this moment.  Pressure ulcers – burn following, no surgery needed.      Diet order: Regular      Anthropometrics:  - Ht.  - Wt. 76.6kg (7/27), 125.4kg (8/3 - unlikely)- RD is unable to re-check wt today as pt did not agree.    - %wt change  - BMI  - IBW     Pertinent Lab Data: 8/2 WBC- 15.52 H/H- 8.4/28.2 Cl- 96     Pertinent Meds: IV abx, senna, pantoprazole, methadone, oxycodone     Physical Findings:  - Appearance: alert, oriented  - GI function: large BM last night  - Tubes:  - Oral/Mouth cavity: no complains  - Skin: stage IV to sacral spine , b/l ischium and b/l heel     Nutrition Requirements (Weight Used: dosing 76.6kg)    Estimated Energy Needs    Continue [v]  1990-2487kcal (MSJ x 1.2-1.5 AF) for pressure ulcers  Estimated Protein Needs    Continue [v]  92-115g (1.2-1.5g/kg act wt) for pressure ulcers (considered recent SILVINA)  Estimated Fluid Needs        Continue [v]   1ml/kcal or per LIP    Nutrient Intake: pt reports very good appetite (eating 100 %meals), improved since last RD visit        [] Previous Nutrition Diagnosis: Increased nutrient needs             [x] Ongoing          [] Resolved    Nutrition Intervention: Meal and snacks. Supplements.     Goal/Expected Outcome: PO intake continues > 100 % meals and snacks in 1-4 days     Indicator/Monitoring: Will monitor energy intake, diet order, labs, body composition, nutrition focused physical findings.     Recommended:  Please change diet to GI soft, CHO consistent. Please add vitamin C 500mg q 12hrs, MVI (w/ minerals), Zinc sulfate 220mg daily (x14 days). When stabilized will add Rene q 12 hrs (if pt agrees).     At risk f/u ( if PO intakes continues to be good at f/u will rescreen in 7 days)

## 2018-08-03 NOTE — PROGRESS NOTE ADULT - SUBJECTIVE AND OBJECTIVE BOX
S: no new events/complaints  Eating fine. Passing BM. No abdo pain/N/V  Paraplegic  No hypoglycemia symptoms      All other pertinent ROS negative.      Vital Signs Last 24 Hrs  T(C): 36.7 (03 Aug 2018 12:41), Max: 36.7 (03 Aug 2018 12:41)  T(F): 98 (03 Aug 2018 12:41), Max: 98 (03 Aug 2018 12:41)  HR: 81 (03 Aug 2018 12:41) (63 - 100)  BP: 109/76 (03 Aug 2018 12:41) (97/54 - 125/59)  BP(mean): 83 (03 Aug 2018 11:20) (83 - 83)  RR: 16 (03 Aug 2018 12:41) (16 - 18)  SpO2: --  PHYSICAL EXAM:    Constitutional: NAD, awake and alert, well-developed  HEENT: PERR, EOMI, Normal Hearing, MMM  Neck: Soft and supple, No LAD, No JVD  Respiratory: Breath sounds are clear bilaterally, No wheezing, rales or rhonchi  Cardiovascular: S1 and S2, regular rate and rhythm, no Murmurs, gallops or rubs  Gastrointestinal: Bowel Sounds present, soft, nontender, nondistended, no guarding, no rebound  Extremities: paraplegic    MEDICATIONS:  MEDICATIONS  (STANDING):  ALPRAZolam 2 milliGRAM(s) Oral daily  amoxicillin  500 milliGRAM(s)/clavulanate 1 Tablet(s) Oral two times a day  diazepam    Tablet 10 milliGRAM(s) Oral two times a day  gabapentin 800 milliGRAM(s) Oral three times a day  methadone    Tablet 80 milliGRAM(s) Oral four times a day  metroNIDAZOLE    Tablet 500 milliGRAM(s) Oral every 8 hours  oxybutynin 10 milliGRAM(s) Oral two times a day  oxyCODONE    IR 30 milliGRAM(s) Oral four times a day  pantoprazole  Injectable 40 milliGRAM(s) IV Push daily  senna 2 Tablet(s) Oral at bedtime      LABS: All Labs Reviewed:                        8.4    15.52 )-----------( 279      ( 02 Aug 2018 18:23 )             28.1     08-02    135  |  96<L>  |  15  ----------------------------<  82  4.0   |  24  |  0.9    Ca    7.5<L>      02 Aug 2018 18:23    TPro  4.8<L>  /  Alb  1.9<L>  /  TBili  0.3  /  DBili  x   /  AST  38  /  ALT  13  /  AlkPhos  104  08-02    PT/INR - ( 02 Aug 2018 18:23 )   PT: 15.80 sec;   INR: 1.47 ratio         PTT - ( 02 Aug 2018 18:23 )  PTT:40.9 sec      Blood Culture:     Radiology: reviewed

## 2018-08-04 RX ORDER — OXYCODONE HYDROCHLORIDE 5 MG/1
30 TABLET ORAL
Qty: 0 | Refills: 0 | Status: DISCONTINUED | OUTPATIENT
Start: 2018-08-04 | End: 2018-08-05

## 2018-08-04 RX ORDER — DIAZEPAM 5 MG
10 TABLET ORAL
Qty: 0 | Refills: 0 | Status: DISCONTINUED | OUTPATIENT
Start: 2018-08-04 | End: 2018-08-05

## 2018-08-04 RX ORDER — METHADONE HYDROCHLORIDE 40 MG/1
80 TABLET ORAL
Qty: 0 | Refills: 0 | Status: DISCONTINUED | OUTPATIENT
Start: 2018-08-04 | End: 2018-08-05

## 2018-08-04 RX ADMIN — OXYCODONE HYDROCHLORIDE 30 MILLIGRAM(S): 5 TABLET ORAL at 18:44

## 2018-08-04 RX ADMIN — Medication 1 TABLET(S): at 18:44

## 2018-08-04 RX ADMIN — OXYCODONE HYDROCHLORIDE 30 MILLIGRAM(S): 5 TABLET ORAL at 01:39

## 2018-08-04 RX ADMIN — Medication 10 MILLIGRAM(S): at 05:55

## 2018-08-04 RX ADMIN — Medication 2 MILLIGRAM(S): at 12:23

## 2018-08-04 RX ADMIN — Medication 10 MILLIGRAM(S): at 18:44

## 2018-08-04 RX ADMIN — METHADONE HYDROCHLORIDE 80 MILLIGRAM(S): 40 TABLET ORAL at 23:48

## 2018-08-04 RX ADMIN — Medication 1 TABLET(S): at 05:55

## 2018-08-04 RX ADMIN — OXYCODONE HYDROCHLORIDE 30 MILLIGRAM(S): 5 TABLET ORAL at 23:47

## 2018-08-04 RX ADMIN — Medication 500 MILLIGRAM(S): at 14:50

## 2018-08-04 RX ADMIN — GABAPENTIN 800 MILLIGRAM(S): 400 CAPSULE ORAL at 05:55

## 2018-08-04 RX ADMIN — OXYCODONE HYDROCHLORIDE 30 MILLIGRAM(S): 5 TABLET ORAL at 12:53

## 2018-08-04 RX ADMIN — GABAPENTIN 800 MILLIGRAM(S): 400 CAPSULE ORAL at 21:45

## 2018-08-04 RX ADMIN — GABAPENTIN 800 MILLIGRAM(S): 400 CAPSULE ORAL at 14:50

## 2018-08-04 RX ADMIN — Medication 500 MILLIGRAM(S): at 21:45

## 2018-08-04 RX ADMIN — OXYCODONE HYDROCHLORIDE 30 MILLIGRAM(S): 5 TABLET ORAL at 12:23

## 2018-08-04 RX ADMIN — OXYCODONE HYDROCHLORIDE 30 MILLIGRAM(S): 5 TABLET ORAL at 06:24

## 2018-08-04 RX ADMIN — Medication 500 MILLIGRAM(S): at 05:55

## 2018-08-04 RX ADMIN — OXYCODONE HYDROCHLORIDE 30 MILLIGRAM(S): 5 TABLET ORAL at 01:09

## 2018-08-04 RX ADMIN — OXYCODONE HYDROCHLORIDE 30 MILLIGRAM(S): 5 TABLET ORAL at 05:54

## 2018-08-04 RX ADMIN — PANTOPRAZOLE SODIUM 40 MILLIGRAM(S): 20 TABLET, DELAYED RELEASE ORAL at 12:23

## 2018-08-04 RX ADMIN — METHADONE HYDROCHLORIDE 80 MILLIGRAM(S): 40 TABLET ORAL at 18:44

## 2018-08-04 NOTE — PROGRESS NOTE ADULT - ASSESSMENT
56 yo M with PMHx DM2, functional paraplegia 2/2 previous lumbar trauma (able to stand but cannot walk), subsequent sacral decubiti and heel ulcers, suprapubic cath, chronic opiate dependence here with paralytic ileus likely provoked by opiate use    # Ileus 2/2 high dose opiates :   Resolved with some bowel rest.  Advanced diet. Asymptomatic now.   decreased bowel sounds currently.  Been nonadherent with NPO recs earlier.     #Opiate dependence  On high dose meds.   Since ileus resolved will let him be on his current dose  F/u Dr Garcia outside.     #Aspiration pneumonia  empirically started on unasyn.  Now on Augmentin.   Complete total 10 days.   Despite leukocytosis patient feels fine.     #chronic sacral and heel ulcers POA  burn f/u/ wound care    # Recurrent Hypoglycemias :   on no insulin. Happens despite of him eating fine.   Could have been d/t sepsis but no other signs of active infection.   Could be d/t his high doses of Methadone. Will bring this to attention of his pain doc.   Cannot decrease the methadone dose.   Will advise him to keep sugar pills in pocket even though Patient asymptomatic from this     8/4 : But now sugars fine     #Diarrhea : (around 2 BM/day) x 3 days   No N/V/Abdo pain  Could be the expected response from him being ileus earlier.   But will r/o C Diff d/t recent antibiotic use.   If c diff ruled out, family wants to wait before diarrhea better lest it might irritate his wounds.   Will f/u      D/c planning on PO Abx tomorrow.     DVT ppx : refusing    Anticipate for d/c  tomorrow.

## 2018-08-04 NOTE — PROGRESS NOTE ADULT - PROVIDER SPECIALTY LIST ADULT
Burn
Burn
Hospitalist
Infectious Disease
Internal Medicine
Pain Medicine
Surgery
Surgery
Hospitalist
Infectious Disease

## 2018-08-04 NOTE — PROGRESS NOTE ADULT - SUBJECTIVE AND OBJECTIVE BOX
S : No CP/sob  Now has diarrhea (around 2 BM/day) x 3 days   No N/V/Abdo pain      All other pertinent ROS negative.      08-03-18 @ 07:01  -  08-04-18 @ 07:00  --------------------------------------------------------  IN: 0 mL / OUT: 1700 mL / NET: -1700 mL    08-04-18 @ 07:01  -  08-04-18 @ 13:12  --------------------------------------------------------  IN: 400 mL / OUT: 0 mL / NET: 400 mL      Vital Signs Last 24 Hrs  T(C): 36.6 (04 Aug 2018 12:20), Max: 36.8 (04 Aug 2018 05:33)  T(F): 97.9 (04 Aug 2018 12:20), Max: 98.2 (04 Aug 2018 05:33)  HR: 85 (04 Aug 2018 12:20) (85 - 90)  BP: 124/70 (04 Aug 2018 12:20) (113/80 - 143/65)  BP(mean): 92 (03 Aug 2018 18:10) (92 - 92)  RR: 18 (04 Aug 2018 12:20) (16 - 18)  SpO2: 96% (04 Aug 2018 12:20) (95% - 96%)  PHYSICAL EXAM:    Constitutional: NAD, awake and alert, well-developed  HEENT: PERR, EOMI, Normal Hearing, MMM  Neck: Soft and supple, No LAD, No JVD  Respiratory: Breath sounds are clear bilaterally, No wheezing, rales or rhonchi  Cardiovascular: S1 and S2, regular rate and rhythm, no Murmurs, gallops or rubs  Gastrointestinal: Bowel Sounds decreased, soft, nontender, nondistended, no guarding, no rebound  Extremities: paraplegic  Sacral decubs      MEDICATIONS:  MEDICATIONS  (STANDING):  ALPRAZolam 2 milliGRAM(s) Oral daily  amoxicillin  500 milliGRAM(s)/clavulanate 1 Tablet(s) Oral two times a day  diazepam    Tablet 10 milliGRAM(s) Oral two times a day  gabapentin 800 milliGRAM(s) Oral three times a day  methadone    Tablet 80 milliGRAM(s) Oral four times a day  metroNIDAZOLE    Tablet 500 milliGRAM(s) Oral every 8 hours  oxybutynin 10 milliGRAM(s) Oral two times a day  oxyCODONE    IR 30 milliGRAM(s) Oral four times a day  pantoprazole  Injectable 40 milliGRAM(s) IV Push daily  senna 2 Tablet(s) Oral at bedtime      LABS: All Labs Reviewed:                        8.4    15.52 )-----------( 279      ( 02 Aug 2018 18:23 )             28.1     08-02    135  |  96<L>  |  15  ----------------------------<  82  4.0   |  24  |  0.9    Ca    7.5<L>      02 Aug 2018 18:23    TPro  4.8<L>  /  Alb  1.9<L>  /  TBili  0.3  /  DBili  x   /  AST  38  /  ALT  13  /  AlkPhos  104  08-02    PT/INR - ( 02 Aug 2018 18:23 )   PT: 15.80 sec;   INR: 1.47 ratio         PTT - ( 02 Aug 2018 18:23 )  PTT:40.9 sec      Blood Culture:     Radiology: reviewed

## 2018-08-05 ENCOUNTER — INBOUND DOCUMENT (OUTPATIENT)
Age: 58
End: 2018-08-05

## 2018-08-05 VITALS — OXYGEN SATURATION: 95 %

## 2018-08-05 RX ORDER — SITAGLIPTIN 50 MG/1
1 TABLET, FILM COATED ORAL
Qty: 0 | Refills: 0 | COMMUNITY

## 2018-08-05 RX ORDER — LIRAGLUTIDE 6 MG/ML
0 INJECTION SUBCUTANEOUS
Qty: 0 | Refills: 0 | COMMUNITY

## 2018-08-05 RX ADMIN — METHADONE HYDROCHLORIDE 80 MILLIGRAM(S): 40 TABLET ORAL at 06:27

## 2018-08-05 RX ADMIN — Medication 10 MILLIGRAM(S): at 06:25

## 2018-08-05 RX ADMIN — OXYCODONE HYDROCHLORIDE 30 MILLIGRAM(S): 5 TABLET ORAL at 07:43

## 2018-08-05 RX ADMIN — GABAPENTIN 800 MILLIGRAM(S): 400 CAPSULE ORAL at 06:20

## 2018-08-05 RX ADMIN — Medication 2 MILLIGRAM(S): at 11:49

## 2018-08-05 RX ADMIN — Medication 1 TABLET(S): at 06:20

## 2018-08-05 RX ADMIN — OXYCODONE HYDROCHLORIDE 30 MILLIGRAM(S): 5 TABLET ORAL at 11:48

## 2018-08-05 RX ADMIN — METHADONE HYDROCHLORIDE 80 MILLIGRAM(S): 40 TABLET ORAL at 11:49

## 2018-08-05 RX ADMIN — OXYCODONE HYDROCHLORIDE 30 MILLIGRAM(S): 5 TABLET ORAL at 06:26

## 2018-08-05 RX ADMIN — Medication 500 MILLIGRAM(S): at 06:20

## 2018-08-05 RX ADMIN — Medication 10 MILLIGRAM(S): at 06:20

## 2018-08-05 RX ADMIN — OXYCODONE HYDROCHLORIDE 30 MILLIGRAM(S): 5 TABLET ORAL at 00:17

## 2018-08-09 DIAGNOSIS — R57.1 HYPOVOLEMIC SHOCK: ICD-10-CM

## 2018-08-09 DIAGNOSIS — I95.9 HYPOTENSION, UNSPECIFIED: ICD-10-CM

## 2018-08-09 DIAGNOSIS — L89.624 PRESSURE ULCER OF LEFT HEEL, STAGE 4: ICD-10-CM

## 2018-08-09 DIAGNOSIS — E56.1 DEFICIENCY OF VITAMIN K: ICD-10-CM

## 2018-08-09 DIAGNOSIS — T40.2X5A ADVERSE EFFECT OF OTHER OPIOIDS, INITIAL ENCOUNTER: ICD-10-CM

## 2018-08-09 DIAGNOSIS — R53.1 WEAKNESS: ICD-10-CM

## 2018-08-09 DIAGNOSIS — R19.7 DIARRHEA, UNSPECIFIED: ICD-10-CM

## 2018-08-09 DIAGNOSIS — J69.0 PNEUMONITIS DUE TO INHALATION OF FOOD AND VOMIT: ICD-10-CM

## 2018-08-09 DIAGNOSIS — R10.9 UNSPECIFIED ABDOMINAL PAIN: ICD-10-CM

## 2018-08-09 DIAGNOSIS — R53.2 FUNCTIONAL QUADRIPLEGIA: ICD-10-CM

## 2018-08-09 DIAGNOSIS — R65.11 SYSTEMIC INFLAMMATORY RESPONSE SYNDROME (SIRS) OF NON-INFECTIOUS ORIGIN WITH ACUTE ORGAN DYSFUNCTION: ICD-10-CM

## 2018-08-09 DIAGNOSIS — L89.614 PRESSURE ULCER OF RIGHT HEEL, STAGE 4: ICD-10-CM

## 2018-08-09 DIAGNOSIS — N17.9 ACUTE KIDNEY FAILURE, UNSPECIFIED: ICD-10-CM

## 2018-08-09 DIAGNOSIS — L89.154 PRESSURE ULCER OF SACRAL REGION, STAGE 4: ICD-10-CM

## 2018-08-09 DIAGNOSIS — L89.314 PRESSURE ULCER OF RIGHT BUTTOCK, STAGE 4: ICD-10-CM

## 2018-08-09 DIAGNOSIS — M84.48XS PATHOLOGICAL FRACTURE, OTHER SITE, SEQUELA: ICD-10-CM

## 2018-08-09 DIAGNOSIS — Z79.4 LONG TERM (CURRENT) USE OF INSULIN: ICD-10-CM

## 2018-08-09 DIAGNOSIS — K56.0 PARALYTIC ILEUS: ICD-10-CM

## 2018-08-09 DIAGNOSIS — L89.324 PRESSURE ULCER OF LEFT BUTTOCK, STAGE 4: ICD-10-CM

## 2018-08-09 DIAGNOSIS — F11.20 OPIOID DEPENDENCE, UNCOMPLICATED: ICD-10-CM

## 2018-08-09 DIAGNOSIS — E83.42 HYPOMAGNESEMIA: ICD-10-CM

## 2018-08-09 DIAGNOSIS — Z93.50 UNSPECIFIED CYSTOSTOMY STATUS: ICD-10-CM

## 2018-08-09 DIAGNOSIS — E11.649 TYPE 2 DIABETES MELLITUS WITH HYPOGLYCEMIA WITHOUT COMA: ICD-10-CM

## 2018-08-15 ENCOUNTER — INPATIENT (INPATIENT)
Facility: HOSPITAL | Age: 58
LOS: 4 days | Discharge: HOME HEALTH | End: 2018-08-20
Attending: INTERNAL MEDICINE | Admitting: INTERNAL MEDICINE

## 2018-08-15 VITALS
TEMPERATURE: 98 F | RESPIRATION RATE: 20 BRPM | SYSTOLIC BLOOD PRESSURE: 108 MMHG | DIASTOLIC BLOOD PRESSURE: 60 MMHG | OXYGEN SATURATION: 98 % | HEART RATE: 95 BPM

## 2018-08-15 DIAGNOSIS — Z98.890 OTHER SPECIFIED POSTPROCEDURAL STATES: Chronic | ICD-10-CM

## 2018-08-15 DIAGNOSIS — Z89.421 ACQUIRED ABSENCE OF OTHER RIGHT TOE(S): Chronic | ICD-10-CM

## 2018-08-15 PROBLEM — Z93.59 OTHER CYSTOSTOMY STATUS: Chronic | Status: ACTIVE | Noted: 2018-07-26

## 2018-08-15 PROBLEM — L89.90 PRESSURE ULCER OF UNSPECIFIED SITE, UNSPECIFIED STAGE: Chronic | Status: ACTIVE | Noted: 2018-07-26

## 2018-08-15 LAB
ALBUMIN SERPL ELPH-MCNC: 2 G/DL — LOW (ref 3.5–5.2)
ALP SERPL-CCNC: 122 U/L — HIGH (ref 30–115)
ALT FLD-CCNC: 11 U/L — SIGNIFICANT CHANGE UP (ref 0–41)
ANION GAP SERPL CALC-SCNC: 15 MMOL/L — HIGH (ref 7–14)
APPEARANCE UR: ABNORMAL
AST SERPL-CCNC: 23 U/L — SIGNIFICANT CHANGE UP (ref 0–41)
BILIRUB DIRECT SERPL-MCNC: <0.2 MG/DL — SIGNIFICANT CHANGE UP (ref 0–0.2)
BILIRUB INDIRECT FLD-MCNC: 0 MG/DL — SIGNIFICANT CHANGE UP (ref 0.2–1.2)
BILIRUB SERPL-MCNC: <0.2 MG/DL — SIGNIFICANT CHANGE UP (ref 0.2–1.2)
BILIRUB UR-MCNC: NEGATIVE — SIGNIFICANT CHANGE UP
BUN SERPL-MCNC: 15 MG/DL — SIGNIFICANT CHANGE UP (ref 10–20)
CALCIUM SERPL-MCNC: 8.1 MG/DL — LOW (ref 8.5–10.1)
CHLORIDE SERPL-SCNC: 101 MMOL/L — SIGNIFICANT CHANGE UP (ref 98–110)
CO2 SERPL-SCNC: 22 MMOL/L — SIGNIFICANT CHANGE UP (ref 17–32)
COLOR SPEC: YELLOW — SIGNIFICANT CHANGE UP
CREAT SERPL-MCNC: 0.7 MG/DL — SIGNIFICANT CHANGE UP (ref 0.7–1.5)
DIFF PNL FLD: ABNORMAL
EPI CELLS # UR: ABNORMAL /HPF
GLUCOSE SERPL-MCNC: 107 MG/DL — HIGH (ref 70–99)
GLUCOSE UR QL: NEGATIVE MG/DL — SIGNIFICANT CHANGE UP
HCT VFR BLD CALC: 29.8 % — LOW (ref 42–52)
HGB BLD-MCNC: 9 G/DL — LOW (ref 14–18)
KETONES UR-MCNC: NEGATIVE — SIGNIFICANT CHANGE UP
LACTATE SERPL-SCNC: 1.3 MMOL/L — SIGNIFICANT CHANGE UP (ref 0.5–2.2)
LEUKOCYTE ESTERASE UR-ACNC: ABNORMAL
LIDOCAIN IGE QN: 32 U/L — SIGNIFICANT CHANGE UP (ref 7–60)
MCHC RBC-ENTMCNC: 24 PG — LOW (ref 27–31)
MCHC RBC-ENTMCNC: 30.2 G/DL — LOW (ref 32–37)
MCV RBC AUTO: 79.5 FL — LOW (ref 80–94)
NITRITE UR-MCNC: NEGATIVE — SIGNIFICANT CHANGE UP
NRBC # BLD: 0 /100 WBCS — SIGNIFICANT CHANGE UP (ref 0–0)
PH UR: 7.5 — SIGNIFICANT CHANGE UP (ref 5–8)
PLATELET # BLD AUTO: 662 K/UL — HIGH (ref 130–400)
POTASSIUM SERPL-MCNC: 5.6 MMOL/L — HIGH (ref 3.5–5)
POTASSIUM SERPL-SCNC: 5.6 MMOL/L — HIGH (ref 3.5–5)
PROT SERPL-MCNC: 5.9 G/DL — LOW (ref 6–8)
PROT UR-MCNC: >=300 MG/DL
RBC # BLD: 3.75 M/UL — LOW (ref 4.7–6.1)
RBC # FLD: 17.2 % — HIGH (ref 11.5–14.5)
RBC CASTS # UR COMP ASSIST: ABNORMAL /HPF
SODIUM SERPL-SCNC: 138 MMOL/L — SIGNIFICANT CHANGE UP (ref 135–146)
SP GR SPEC: 1.02 — SIGNIFICANT CHANGE UP (ref 1.01–1.03)
UROBILINOGEN FLD QL: 0.2 MG/DL — SIGNIFICANT CHANGE UP (ref 0.2–0.2)
WBC # BLD: 13.74 K/UL — HIGH (ref 4.8–10.8)
WBC # FLD AUTO: 13.74 K/UL — HIGH (ref 4.8–10.8)
WBC UR QL: >50 /HPF

## 2018-08-15 RX ORDER — ALPRAZOLAM 0.25 MG
2 TABLET ORAL AT BEDTIME
Qty: 0 | Refills: 0 | Status: DISCONTINUED | OUTPATIENT
Start: 2018-08-15 | End: 2018-08-20

## 2018-08-15 RX ORDER — LACTULOSE 10 G/15ML
20 SOLUTION ORAL AT BEDTIME
Qty: 0 | Refills: 0 | Status: DISCONTINUED | OUTPATIENT
Start: 2018-08-15 | End: 2018-08-20

## 2018-08-15 RX ORDER — PANTOPRAZOLE SODIUM 20 MG/1
40 TABLET, DELAYED RELEASE ORAL
Qty: 0 | Refills: 0 | Status: DISCONTINUED | OUTPATIENT
Start: 2018-08-15 | End: 2018-08-20

## 2018-08-15 RX ORDER — METHADONE HYDROCHLORIDE 40 MG/1
80 TABLET ORAL
Qty: 0 | Refills: 0 | Status: DISCONTINUED | OUTPATIENT
Start: 2018-08-15 | End: 2018-08-15

## 2018-08-15 RX ORDER — SODIUM CHLORIDE 9 MG/ML
1000 INJECTION, SOLUTION INTRAVENOUS ONCE
Qty: 0 | Refills: 0 | Status: COMPLETED | OUTPATIENT
Start: 2018-08-15 | End: 2018-08-15

## 2018-08-15 RX ORDER — METHADONE HYDROCHLORIDE 40 MG/1
80 TABLET ORAL
Qty: 0 | Refills: 0 | Status: DISCONTINUED | OUTPATIENT
Start: 2018-08-15 | End: 2018-08-20

## 2018-08-15 RX ORDER — SENNA PLUS 8.6 MG/1
2 TABLET ORAL AT BEDTIME
Qty: 0 | Refills: 0 | Status: DISCONTINUED | OUTPATIENT
Start: 2018-08-15 | End: 2018-08-20

## 2018-08-15 RX ORDER — OXYBUTYNIN CHLORIDE 5 MG
10 TABLET ORAL DAILY
Qty: 0 | Refills: 0 | Status: DISCONTINUED | OUTPATIENT
Start: 2018-08-15 | End: 2018-08-20

## 2018-08-15 RX ORDER — ACETAMINOPHEN 500 MG
650 TABLET ORAL EVERY 6 HOURS
Qty: 0 | Refills: 0 | Status: DISCONTINUED | OUTPATIENT
Start: 2018-08-15 | End: 2018-08-20

## 2018-08-15 RX ORDER — DOCUSATE SODIUM 100 MG
100 CAPSULE ORAL
Qty: 0 | Refills: 0 | Status: DISCONTINUED | OUTPATIENT
Start: 2018-08-15 | End: 2018-08-20

## 2018-08-15 RX ORDER — ENOXAPARIN SODIUM 100 MG/ML
40 INJECTION SUBCUTANEOUS DAILY
Qty: 0 | Refills: 0 | Status: DISCONTINUED | OUTPATIENT
Start: 2018-08-15 | End: 2018-08-20

## 2018-08-15 RX ORDER — GABAPENTIN 400 MG/1
800 CAPSULE ORAL THREE TIMES A DAY
Qty: 0 | Refills: 0 | Status: DISCONTINUED | OUTPATIENT
Start: 2018-08-15 | End: 2018-08-20

## 2018-08-15 RX ORDER — LISINOPRIL 2.5 MG/1
20 TABLET ORAL DAILY
Qty: 0 | Refills: 0 | Status: DISCONTINUED | OUTPATIENT
Start: 2018-08-15 | End: 2018-08-20

## 2018-08-15 RX ORDER — DIAZEPAM 5 MG
10 TABLET ORAL
Qty: 0 | Refills: 0 | Status: DISCONTINUED | OUTPATIENT
Start: 2018-08-15 | End: 2018-08-20

## 2018-08-15 RX ADMIN — GABAPENTIN 800 MILLIGRAM(S): 400 CAPSULE ORAL at 23:44

## 2018-08-15 RX ADMIN — Medication 100 MILLIGRAM(S): at 17:48

## 2018-08-15 RX ADMIN — Medication 10 MILLIGRAM(S): at 17:05

## 2018-08-15 RX ADMIN — Medication 2 MILLIGRAM(S): at 23:41

## 2018-08-15 RX ADMIN — SENNA PLUS 2 TABLET(S): 8.6 TABLET ORAL at 23:43

## 2018-08-15 RX ADMIN — Medication 1 ENEMA: at 23:44

## 2018-08-15 RX ADMIN — LACTULOSE 20 GRAM(S): 10 SOLUTION ORAL at 23:44

## 2018-08-15 RX ADMIN — SODIUM CHLORIDE 1000 MILLILITER(S): 9 INJECTION, SOLUTION INTRAVENOUS at 10:15

## 2018-08-15 RX ADMIN — SODIUM CHLORIDE 1000 MILLILITER(S): 9 INJECTION, SOLUTION INTRAVENOUS at 11:30

## 2018-08-15 NOTE — ED PROVIDER NOTE - PROGRESS NOTE DETAILS
pt refusing oral contrast, states had to get ngt on previous admisson for oral contrast which he is also refusing, discussed holding off on opiods at this time as pt had ileus secondary to this, understands at this time, will continue to monitor and reassess. hgb 8.4 on 8/2, today 9. pt aware of all labs and imaging, rectal done by P.A> brown stool no impaction, soft. Good rectal tone. No melena/brbpr. Medical admitting team aware of imaging and pt's being on chronic opiates causing recurrent issues with abdominal pain. admitted as discussed and agreed with pt.

## 2018-08-15 NOTE — ED ADULT NURSE NOTE - NSIMPLEMENTINTERV_GEN_ALL_ED
Implemented All Fall Risk Interventions:  [place] to call system. Call bell, personal items and telephone within reach. Instruct patient to call for assistance. Room bathroom lighting operational. Non-slip footwear when patient is off stretcher. Physically safe environment: no spills, clutter or unnecessary equipment. Stretcher in lowest position, wheels locked, appropriate side rails in place. Provide visual cue, wrist band, yellow gown, etc. Monitor gait and stability. Monitor for mental status changes and reorient to person, place, and time. Review medications for side effects contributing to fall risk. Reinforce activity limits and safety measures with patient and family.

## 2018-08-15 NOTE — ED ADULT NURSE NOTE - OBJECTIVE STATEMENT
58 y/o M pt w/ pmh DM2, paraplegia, multiple pressure ulcer, present to ED for abd pain, pt was sleeping when he had acute RUQ pain, which worsen, pt was just admitted to hospital for paralytic ileus, on exam abd rigid, distend, ND, + BS in all 4 quadrant, neg CVA tenderness, denies N/V/D, fever, chills, dysuria, hematuria, melena 56 y/o M pt w/ pmh DM2, paraplegia, multiple pressure ulcer, present to ED for abd pain, pt was sleeping when he had acute RUQ pain, which worsen, pt was just admitted to hospital for paralytic ileus, on exam abd rigid, distend, NT, + hypoactive BS in all 4 quadrant, LBM was this morning, neg CVA tenderness, denies N/V/D, fever, chills, dysuria, hematuria, melena, await MD kiran

## 2018-08-15 NOTE — ED ADULT NURSE NOTE - CAS EDN DISCHARGE ASSESSMENT
Patient baseline mental status/No adverse reaction to first time med in ED/Alert and oriented to person, place and time/Awake

## 2018-08-15 NOTE — H&P ADULT - HISTORY OF PRESENT ILLNESS
58 yo M with PMHx DM, B/L LE weakness 2/2 previous lumbar trauma (able to stand but cannot walk), subsequent sacral decubiti and heel ulcers, suprapubic cath, p/w diffuse abdominal pain since for 1 day. His pain is 7/10 and usually takes high doses of pain medications but has since not taken most of them due to poor tolerance of PO intake. He lost 2 Kg weight since March, has no nausea, vomiting or diarrhea. Pt had BM on the day before admission as well as small BM in ED, no blood or melena. Pt unable to tolerate PO or take meds 2/2 pain. Pt states on 320mg methadone and 240mg oxycontin daily. Took total of 8 percocet tablets in past 12 hours. Denies fever, headache, lightheadedness, CP, SOB, cough. He states he recently lost his father and has not been eating well lately then started having abdominal distension. He states he had a BM yesterday it was not watery, or black or hardened stool. He was evaluated by surgery and placed on IVF and is now improving. He required vasopressor support for a short period of time, but is now stable without pressor support.      Vitals in ED : BP: 108/60, 98F, 98, 20, 98% on room air, Labs showed slight inc WBC count (54419), CT abd and pelvis was done showed large fecal load. received 1 litre bolus in ED.    Recently admitted in [facility] on 26 July for paralytic ileus sec to opioid over dose and was d/ricco on 8/5. 56 yo M with PMHx DM, B/L LE weakness 2/2 previous lumbar trauma (able to stand but cannot walk), subsequent sacral decubiti and heel ulcers, suprapubic cath, p/w diffuse abdominal pain since last 4 days, and dec appetite since last 10 days. As per patient he was d/ricco from hospital on 8/5 he felt better after that but abd pain started 4 days ago, diffuse, intermittent, initially started as 4-6/10 in intensity, sometimes dull in nature, sometimes stabbing pain, not associated with any food intake, associated with loose stools twice a day for last 4 days and also reported 50 to 60 pounds weight loss since June, and loss of appetite. Denies nay chest pian, SOB, fever, chills, nausea, vomiting, headache, dizziness, cough, dysuria.   Vitals in ED : BP: 108/60, 98F, 98, 20, 98% on room air, Labs showed slight inc WBC count (94079), CT abd and pelvis was done showed large fecal load. received 1 litre bolus in ED. ELISABETH was done by ED didn't get any stools.    Recently admitted in [facility] on 26 July for paralytic ileus sec to opioid over dose and was d/ricco on 8/5.

## 2018-08-15 NOTE — H&P ADULT - NSHPLABSRESULTS_GEN_ALL_CORE
9.0    13.74 )-----------( 662      ( 15 Aug 2018 09:30 )             29.8       08-15    138  |  101  |  15  ----------------------------<  107<H>  5.6<H>   |  22  |  0.7    Ca    8.1<L>      15 Aug 2018 09:30    TPro  5.9<L>  /  Alb  2.0<L>  /  TBili  <0.2  /  DBili  <0.2  /  AST  23  /  ALT  11  /  AlkPhos  122<H>  08-15    < from: CT Abdomen and Pelvis w/ IV Cont (08.15.18 @ 11:53) >    IMPRESSION:     Since August 1, 2018;    1. Large colonic fecal load with interval increase in small   abdominopelvic ascites and mesenteric edema. No pneumoperitoneum.    2. Improving bilobar airspace consolidations.    3. Interval resolution of small bowel dilatation.    4. Additional chronic changes as above.    < end of copied text >    Urinalysis (08.15.18 @ 09:30)    Glucose Qualitative, Urine: Negative mg/dL    Blood, Urine: Trace    pH Urine: 7.5    Color: Yellow    Urine Appearance: Cloudy    Bilirubin: Negative    Ketone - Urine: Negative    Specific Gravity: 1.025    Protein, Urine: >=300 mg/dL    Urobilinogen: 0.2 mg/dL    Nitrite: Negative    Leukocyte Esterase Concentration: Moderate

## 2018-08-15 NOTE — ED PROVIDER NOTE - CARE PLAN
Assessment and plan of treatment:	Plan: Labs, ivf, urine, imaging, reassess. Principal Discharge DX:	Abdominal pain  Assessment and plan of treatment:	Plan: Labs, ivf, urine, imaging, reassess.  Secondary Diagnosis:	Feces contents abnormal

## 2018-08-15 NOTE — ED ADULT NURSE NOTE - NS ED NOTE  TALK SOMEONE YN
Problem: Patient Care Overview  Goal: Plan of Care/Patient Progress Review  Outcome: Improving  Pt A/Ox4 pleasant and cooperative during shift.  Pts BP elevated this /70's, 10 mg IV hydralazine ordered every 6 hrs and cardizem gtt decreased to 10 mg/hr, in anticipation to transition patient back to home med regimen, pts BP now improved to 120/70's.  CVP's this afternoon 7, down from 11.   Pt taking PO pills, toelrating well.  NG remains in place over 1L bile out of NG during 12 hr shift.  MIdline abd with staples, weeping fluid, ABD's in placed, changed x2 during shift.  Left RIVKA's with scant output, though left LLQ drain #2 may be leaking around site or drainage may be related to midline incision, difficult to assess.  Pt did get up to standing scale this afternoon with PT with a strong 2, standing weight 108.59 kg.  No vomiting during shift, pt weaned down to 3L NC, tolerating well.  Pt up to chair x1 via lift.  Dr. Yanes made aware I noticed a irregular, blue/black colored mole on top of patients head.  Phos 2.0, replaced with 15 mm.         No

## 2018-08-15 NOTE — H&P ADULT - ASSESSMENT
#)Abd pain, Diarrhoea, dec appetite likley sec to constipation followed by overflow diarrhoea (CT scan showed  Large colonic fecal load with interval increase in small abdominopelvic ascites and mesenteric edema)  - ELISABETH was performed in ED couldn't get any stools.  -Follow up with GI     #)Hyperkalemia  Hemolysed check BMP at 4 PM    #)DM  check FS, If FS>180 will start on insulin    #)Diet: DASH diet with carb consistent  #)Activity: OOBTC  #)DVT ppx: Lovenox SC  #)GI ppx; Protonix  #)Dispo; From home  #)Code: Full 56 yo M with PMHx DM, B/L LE weakness 2/2 previous lumbar trauma (able to stand but cannot walk), subsequent sacral decubiti and heel ulcers, suprapubic cath, p/w diffuse abdominal pain since last 4 days, and dec appetite since last 10 days    #)Abd pain, dec appetite, loose stools likely sec to constipation followed by overflow diarrhoea (CT scan showed  Large colonic fecal load with interval increase in small abdominopelvic ascites and mesenteric edema)  -Today he had only one bowel movement  - ELISABETH was performed in ED couldn't get any stools.  -Hold oxycodone for now  -c/w senna. colace, lactulose  -clear liquids for now and advance diet as tolerated  -weight loss of 50 to 60 pounds never had colonoscopy he doesn't want to undergo colonoscopy.  -Follow up with GI     #)Hyperkalemia  Hemolysed check BMP at 4 PM    #)DM  check FS, If FS>180 will start on insulin  c/w gabapentin    #)Opioid dependance  c/w methadone    #)Anxiety/Muscle spasm  c/w xanax and valium    #)Diet: liquids diet for now advance diet as tolerated  #)Activity: OOBTC  #)DVT ppx: Lovenox SC  #)GI ppx; Protonix  #)Dispo; From home  #)Code: DNR/DNI 56 yo M with PMHx DM, B/L LE weakness 2/2 previous lumbar trauma (able to stand but cannot walk), subsequent sacral decubiti and heel ulcers, suprapubic cath, p/w diffuse abdominal pain since last 4 days, and dec appetite since last 10 days    #)Abd pain, dec appetite, loose stools likely sec to constipation followed by overflow diarrhoea (CT scan showed  Large colonic fecal load with interval increase in small abdominopelvic ascites and mesenteric edema)  -Today he had only one bowel movement  - ELISABETH was performed in ED couldn't get any stools.  -Hold oxycodone for now  -c/w senna. colace, lactulose  -clear liquids for now and advance diet as tolerated  -weight loss of 50 to 60 pounds never had colonoscopy he doesn't want to undergo colonoscopy.  -Follow up with GI     #)Hyperkalemia  Hemolysed check BMP at 4 PM    #)DM  check FS, If FS>180 will start on insulin  c/w gabapentin    #)Opioid dependance  c/w methadone    #)Anxiety/Muscle spasm  c/w xanax and valium    #)Paraplegia with SPC, Pressure ulcer stable no signs of infection, slight ly elevated WBC count, but no fever, dysuria  Follow up as an OP with Dr. Gooden.    #)Diet: liquids diet for now advance diet as tolerated  #)Activity: OOBTC  #)DVT ppx: Lovenox SC  #)GI ppx; Protonix  #)Dispo; From home  #)Code: DNR/DNI

## 2018-08-15 NOTE — H&P ADULT - PMH
Diabetes    Lumbar compression fracture    Pressure ulcer    Suprapubic catheter Diabetes    Hypertension    Lumbar compression fracture    Pressure ulcer    Suprapubic catheter

## 2018-08-15 NOTE — ED PROVIDER NOTE - OBJECTIVE STATEMENT
58 y/o male Hx DM, paraplegic, sacral decubiti, suprapubic tube, recent admission for paralytic ileus 2nd opiate abuse discharge from hospital 8/5 BIBA today c/o "I have abdominal pain with diarrhea and decreased appetite for 10 days. " no fever/ chills/ urinary symptoms

## 2018-08-15 NOTE — ED PROVIDER NOTE - MEDICAL DECISION MAKING DETAILS
pt aware of all labs and imaging, results reviewed, agrees with plan for admission, admitting team aware of pt as well.

## 2018-08-15 NOTE — ED PROVIDER NOTE - ATTENDING CONTRIBUTION TO CARE
I personally evaluated the patient. I reviewed the Resident’s or Physician Assistant’s note (as assigned above), and agree with the findings and plan except as documented in my note.  A 58 y/o m w/ pmhx of DM, lumbar trauma w/ b/l LE weakness on methadone 320 mg, 240 mg of oxycontin, subsequent sacral decubiti and heel ulcers (follows with Dr. Gooden who he saw last week and reports wounds are clean), suprapubic cath, htn, anxiety , recent admission on July 27 th for abd pain (discharged august 3rd - reported to have ileus secondary to opiod dependence), presents for similar generalized abd pain and decreased po intake, reports was feeling better after discharge when he started to feel the symptoms again at 3:00 A.M. No fever, chills, n/v, cp, sob, pleuritic cp, palpitations, diaphoresis, cough, ha/lh/dizziness, numbness/tingling, neck pain/ stiffness, diarrhea, constipation, melena/brbpr, trauma, edema, calf pain/swelling/erythema, sick contacts, recent travel or acute rash.  On Exam: Vital Signs: I have reviewed the initial vital signs. Constitutional: WDWN in nad. Sitting on speaking full sentences. Integumentary: No rash. Pt with ~ 5 ulcers, stage five on back, heel ulcers, dry. clean intact dressing. ENT: Dry MM NECK: Supple, non-tender, no meningeal signs. Cardiovascular: RRR, radial pulses 2/4 b/l. No JVD. Respiratory: BS present b/l, ctabl, no wheezing or crackles, good resp effort and excursion, good air exchange,  no accessory muscle use, no stridor. Gastrointestinal: BS present throughout all 4 quadrants, soft, nd, generalized tenderness to palpation, no rebound tenderness or guarding, no cvat. Musculoskeletal: No calf pain/swelling/erythema. Neurologic: AAOx3, CN II-XII intact, No facial droop or slurring of speech. Pt with chronic LE weakness.

## 2018-08-16 RX ORDER — SODIUM HYPOCHLORITE 0.125 %
1 SOLUTION, NON-ORAL MISCELLANEOUS EVERY 12 HOURS
Qty: 0 | Refills: 0 | Status: DISCONTINUED | OUTPATIENT
Start: 2018-08-16 | End: 2018-08-20

## 2018-08-16 RX ADMIN — GABAPENTIN 800 MILLIGRAM(S): 400 CAPSULE ORAL at 05:22

## 2018-08-16 RX ADMIN — Medication 10 MILLIGRAM(S): at 05:22

## 2018-08-16 RX ADMIN — Medication 2 MILLIGRAM(S): at 21:29

## 2018-08-16 RX ADMIN — Medication 10 MILLIGRAM(S): at 17:21

## 2018-08-16 RX ADMIN — GABAPENTIN 800 MILLIGRAM(S): 400 CAPSULE ORAL at 13:18

## 2018-08-16 RX ADMIN — GABAPENTIN 800 MILLIGRAM(S): 400 CAPSULE ORAL at 21:29

## 2018-08-16 RX ADMIN — METHADONE HYDROCHLORIDE 80 MILLIGRAM(S): 40 TABLET ORAL at 12:11

## 2018-08-16 RX ADMIN — METHADONE HYDROCHLORIDE 80 MILLIGRAM(S): 40 TABLET ORAL at 23:50

## 2018-08-16 RX ADMIN — LISINOPRIL 20 MILLIGRAM(S): 2.5 TABLET ORAL at 05:22

## 2018-08-16 RX ADMIN — METHADONE HYDROCHLORIDE 80 MILLIGRAM(S): 40 TABLET ORAL at 17:21

## 2018-08-16 RX ADMIN — Medication 100 MILLIGRAM(S): at 05:22

## 2018-08-16 RX ADMIN — Medication 10 MILLIGRAM(S): at 13:18

## 2018-08-16 RX ADMIN — Medication 1 APPLICATION(S): at 23:49

## 2018-08-16 NOTE — CONSULT NOTE ADULT - ASSESSMENT
56 yo M with hx of HTN, DM II, Functional paraplegia 2/2 previous lumbar trauma (able to stand but cannot walk), subsequent sacral decubiti and heel ulcers, suprapubic cath, and chronic opiate dependence recent admission in [facility] for paralytic ileus provoked by opiate use, presents to ED for 4d hx of worsening diffuse abdominal pain with diarrhea and decreased appetite.      Diffuse abdominal pain likely 2/2 fecal impaction 2/2 chronic opioid use  - daily mechanical disimpaction  - high colonic enema mixed with mineral oil      Diarrhea 2/2 overflow from fecal impaction  - r/o C.diff as patient was on recent abx      Will follow. 58 yo M with hx of HTN, DM II, Functional paraplegia 2/2 previous lumbar trauma (able to stand but cannot walk), subsequent sacral decubiti and heel ulcers, suprapubic cath, and chronic opiate dependence recent admission in [facility] for paralytic ileus provoked by opiate use, presents to ED for 4d hx of worsening diffuse abdominal pain with diarrhea and decreased appetite.      Diffuse abdominal pain likely 2/2 fecal impaction 2/2 ileus 2/2 chronic opioid use  - daily mechanical disimpaction  - high colonic enema mixed with mineral oil  - may consider daily peripheral opioid antagonist.       Diarrhea 2/2 overflow from fecal impaction 2/2 ileus   - r/o C.diff as patient was on recent abx      Will follow. 58 yo M with hx of HTN, DM II, Functional paraplegia 2/2 previous lumbar trauma (able to stand but cannot walk), subsequent sacral decubiti and heel ulcers, suprapubic cath, and chronic opiate dependence recent admission in [facility] for paralytic ileus provoked by opiate use, presents to ED for 4d hx of worsening diffuse abdominal pain with diarrhea and decreased appetite likely 2/2 opioid/stress induced ileus with fecal impaction.     Family hx: no family hx of GI malignancy.  Social hx: smoke 1/2 packs a day for 20 years. Drink beer socially. Use high dose methadone and oxycodone on daily basic.       Diffuse abdominal pain likely 2/2 fecal impaction 2/2 ileus 2/2 chronic opioid use  - pain improving.   - CT abdo/pelvic reveals large fecal load in colon but interval improvement in small intestine dilatation from last CT scan.   - c/w current laxative for  now  - recommend high colonic enema   - may consider daily peripheral opioid antagonist outpatient if patient still constipated.     Constipation 2/2 ileus  - likely from high dose opioid exacerbated by recent stress.    Will follow. 58 yo M with hx of HTN, DM II, Functional paraplegia 2/2 previous lumbar trauma (able to stand but cannot walk), subsequent sacral decubiti and heel ulcers, suprapubic cath, and chronic opiate dependence recent admission in [facility] for paralytic ileus provoked by opiate use, presents to ED for 4d hx of worsening diffuse abdominal pain with diarrhea and decreased appetite likely 2/2 opioid/stress induced ileus with fecal impaction.     Family hx: no family hx of GI malignancy.  Social hx: smoke 1/2 packs a day for 20 years. Drink beer socially. Use high dose methadone and oxycodone on daily basic.       Diffuse abdominal pain with constipation 2/2 ileus likely from high dose opioid use  - pain improving. Patient is currently defecating.   - CT abdo/pelvic reveals large fecal load in colon but interval improvement in small intestine dilatation from last CT scan.   - c/w current laxative for  now   - patient needs to be on bowel regimen upon discharge.     Fe deficiency anemia  - check Fe study  - patient needs colonoscopy, however, patient refused. 58 yo M with hx of HTN, DM II, Functional paraplegia 2/2 previous lumbar trauma (able to stand but cannot walk), subsequent sacral decubiti and heel ulcers, suprapubic cath, and chronic opiate dependence recent admission in [facility] for paralytic ileus provoked by opiate use, presents to ED for 4d hx of worsening diffuse abdominal pain with diarrhea and decreased appetite likely 2/2 opioid/stress induced ileus with fecal impaction.     Family hx: no family hx of GI malignancy.  Social hx: smoke 1/2 packs a day for 20 years. Drink beer socially. Use high dose methadone and oxycodone on daily basic.       Diffuse abdominal pain with constipation 2/2 ileus likely from high dose opioid use  - pain improving. Patient is currently defecating.   - CT abdo/pelvic reveals large fecal load in colon but interval improvement in small intestine dilatation from last CT scan.   - c/w current laxative for  now   - patient needs to be on bowel regimen upon discharge.     Fe deficiency anemia  - check Fe study  - patient needs EGD and colonoscopy, however, patient refused. risks and benefits d/w pt    call back PRN

## 2018-08-16 NOTE — PROGRESS NOTE ADULT - ASSESSMENT
58 yo M with hx of HTN, DM II, Functional paraplegia 2/2 previous lumbar trauma (able to stand but cannot walk), subsequent sacral decubiti and heel ulcers, suprapubic cath, and chronic opiate dependence recent admission in [facility] for paralytic ileus provoked by opiate use, presents to ED for 4d hx of worsening diffuse abdominal pain with diarrhea and decreased appetite likely 2/2 opioid/stress induced ileus with fecal impaction.       #) diffuse abdominal pain and tenderness 2/2 opioid use and ileus  Per GI  - c/w current laxative for  now  - recommend high colonic enema   - may consider daily peripheral opioid antagonist outpatient if patient still constipated.       #) Hyperkalemia  - Ordered BMP will f/u    #) DM  - Will f/u FS    #) malnutrition  - nUtrition consult    #) opioid dependance  - methadone    #) sacral and heel wounds  - f/u with dr pradhan    #) muscle spasm and pain  -valium and xanax    #) vte prophylaxis    Pt changed the status to DNR yesterday night.

## 2018-08-16 NOTE — CONSULT NOTE ADULT - SUBJECTIVE AND OBJECTIVE BOX
HPI:   58 yo M with PMHx DM, B/L LE weakness 2/2 previous lumbar trauma (able to stand but cannot walk), subsequent sacral decubiti and heel ulcers, suprapubic cath, p/w diffuse abdominal pain since last 4 days, and dec appetite since last 10 days. As per patient he was d/ricco from hospital on  he felt better after that but abd pain started 4 days ago, diffuse, intermittent, initially started as 4-6/10 in intensity, sometimes dull in nature, sometimes stabbing pain, not associated with any food intake, associated with loose stools twice a day for last 4 days and also reported 50 to 60 pounds weight loss since , and loss of appetite. Denies nay chest pian, SOB, fever, chills, nausea, vomiting, headache, dizziness, cough, dysuria.   Vitals in ED : BP: 108/60, 98F, 98, 20, 98% on room air, Labs showed slight inc WBC count (85991), CT abd and pelvis was done showed large fecal load. received 1 litre bolus in ED. ELISABETH was done by ED didn't get any stools.  Recently admitted in [facility] on  for paralytic ileus sec to opioid over dose and was d/ricco on .      GI history:    58 yo M with hx of HTN, DM II, Functional paraplegia 2/2 previous lumbar trauma (able to stand but cannot walk), subsequent sacral decubiti and heel ulcers, suprapubic cath, and chronic opiate dependence recent admission in [facility] for paralytic ileus provoked by opiate use, presents to ED for 4d hx of worsening diffuse abdominal pain with diarrhea and decreased appetite.           REVIEW OF SYSTEMS:    CONSTITUTIONAL: No weakness, fevers or chills  EYES/ENT: No visual changes;  No vertigo or throat pain   NECK: No pain or stiffness  RESPIRATORY: No cough, wheezing, hemoptysis; No shortness of breath  CARDIOVASCULAR: No chest pain or palpitations  GASTROINTESTINAL: No abdominal or epigastric pain. No nausea, vomiting, or hematemesis; No diarrhea or constipation. No melena or hematochezia.  GENITOURINARY: No dysuria, frequency or hematuria  NEUROLOGICAL: No numbness or weakness  SKIN: No itching, rashes      Interval event:  No acute event overnight.          PAST MEDICAL & SURGICAL HISTORY:  Hypertension  Suprapubic catheter  Pressure ulcer  Diabetes  Lumbar compression fracture  S/P debridement  Toe amputation status, right  H/O hernia repair      Allergies  sulfamethizole (Unknown)          MEDICATIONS  (STANDING):  ALPRAZolam 2 milliGRAM(s) Oral at bedtime  diazepam    Tablet 10 milliGRAM(s) Oral two times a day  docusate sodium 100 milliGRAM(s) Oral two times a day  enoxaparin Injectable 40 milliGRAM(s) SubCutaneous daily  gabapentin 800 milliGRAM(s) Oral three times a day  lactulose Syrup 20 Gram(s) Oral at bedtime  lisinopril 20 milliGRAM(s) Oral daily  methadone    Tablet 80 milliGRAM(s) Oral four times a day  oxybutynin 10 milliGRAM(s) Oral daily  pantoprazole    Tablet 40 milliGRAM(s) Oral before breakfast  senna 2 Tablet(s) Oral at bedtime    MEDICATIONS  (PRN):  acetaminophen   Tablet 650 milliGRAM(s) Oral every 6 hours PRN For Temp greater than 38 C (100.4 F)        Vital Signs Last 24 Hrs  T(C): 36.1 (16 Aug 2018 05:27), Max: 36.6 (15 Aug 2018 23:39)  T(F): 96.9 (16 Aug 2018 05:27), Max: 97.8 (15 Aug 2018 23:39)  HR: 84 (16 Aug 2018 05:27) (81 - 84)  BP: 105/65 (16 Aug 2018 05:27) (105/65 - 119/67)  BP(mean): --  RR: 18 (16 Aug 2018 05:27) (18 - 18)  SpO2: 96% (16 Aug 2018 00:00) (96% - 99%)  CAPILLARY BLOOD GLUCOSE  91 (16 Aug 2018 06:30)  89 (15 Aug 2018 23:39)      I&O's Summary    15 Aug 2018 07:01  -  16 Aug 2018 07:00  --------------------------------------------------------  IN: 0 mL / OUT: 1500 mL / NET: -1500 mL        Physical Exam:    -      General : Not in acute distress.    -      HEENT: PEERLA, EOMI, No thyromegaly.     -      Cardiac: S1 & S2. No murmur/gallop/rubs.     -      Pulm: clear to auscultate b/l lung field.     -      GI: positive BS. No tenderness/rigidity/rebound.     -      Musculoskeletal: no pitting edema.     -      Skin: Intact    -      Neuro: A & O3.           Labs:                        9.0    13.74 )-----------( 662      ( 15 Aug 2018 09:30 )             29.8                           08-15    138  |  101  |  15  ----------------------------<  107<H>  5.6<H>   |  22  |  0.7    Ca    8.1<L>      15 Aug 2018 09:30    TPro  5.9<L>  /  Alb  2.0<L>  /  TBili  <0.2  /  DBili  <0.2  /  AST  23  /  ALT  11  /  AlkPhos  122<H>  08-15    LIVER FUNCTIONS - ( 15 Aug 2018 09:30 )  Alb: 2.0 g/dL / Pro: 5.9 g/dL / ALK PHOS: 122 U/L / ALT: 11 U/L / AST: 23 U/L / GGT: x                                                         Urinalysis Basic - ( 15 Aug 2018 09:30 )    Color: Yellow / Appearance: Cloudy / S.025 / pH: x  Gluc: x / Ketone: Negative  / Bili: Negative / Urobili: 0.2 mg/dL   Blood: x / Protein: >=300 mg/dL / Nitrite: Negative   Leuk Esterase: Moderate / RBC: 5-10 /HPF / WBC >50 /HPF   Sq Epi: x / Non Sq Epi: Occasional /HPF / Bacteria: x              Imaging:    CT abdo/pelvic IV contrast 8/15/18:   Since 2018,    Large colonic fecal load with interval increase in small abdominopelvic ascites and mesenteric edema. No pneumoperitoneum.  Interval resolution of small bowel dilatation. Improving bilobar airspace consolidations.      CT abdo/pelvic with PO and IV contrast 18:  Dilated loops of small bowel measuring up to 3.8 cm. Questionable gradual transition in the right lower quadrant. HPI:   58 yo M with PMHx DM, B/L LE weakness 2/2 previous lumbar trauma (able to stand but cannot walk), subsequent sacral decubiti and heel ulcers, suprapubic cath, p/w diffuse abdominal pain since last 4 days, and dec appetite since last 10 days. As per patient he was d/ricco from hospital on  he felt better after that but abd pain started 4 days ago, diffuse, intermittent, initially started as 4-6/10 in intensity, sometimes dull in nature, sometimes stabbing pain, not associated with any food intake, associated with loose stools twice a day for last 4 days and also reported 50 to 60 pounds weight loss since , and loss of appetite. Denies nay chest pian, SOB, fever, chills, nausea, vomiting, headache, dizziness, cough, dysuria.   Vitals in ED : BP: 108/60, 98F, 98, 20, 98% on room air, Labs showed slight inc WBC count ([id number]), CT abd and pelvis was done showed large fecal load. received 1 litre bolus in ED. ELISABETH was done by ED didn't get any stools.  Recently admitted in [facility] on  for paralytic ileus sec to opioid over dose and was d/ricco on .      GI history:    58 yo M with hx of HTN, DM II, Functional paraplegia 2/2 previous lumbar trauma (able to stand but cannot walk), subsequent sacral decubiti and heel ulcers, suprapubic cath, and chronic opiate dependence recent admission in [facility] for paralytic ileus provoked by opiate use, presents to ED for 4d hx of worsening diffuse abdominal pain with diarrhea and decreased appetite. As per patient, he was admitted last time for the same reason but the abdominal pain and distension has improved prior to discharge. However, it has been getting worse again since 4 days ago. Patient also reports that he only had 2 BM since last discharge.  He was on high dose opioid for more than 25 years, but never had any problem with severe constipation or fecal impaction in his life. At baseline he would have BM every 3 days on average until 2 months ago when his father passed away. Patient reports that he was in a lot of stress and he hasn't been eating well. Also reports that he has lost of appetite, with weight loss of ~ 40 lbs in 2 months. Denied any fever, chills, recent infection, chest pain, heartburn, dysphagia, odynophagia, N/V, melena, hematochezia, change in urination.   Never had colonoscopy or EGD.      Family hx: no family hx of GI malignancy.  Social hx: smoke 1/2 packs a day for 20 years. Drink beer socially. Use high dose methadone and oxycodone on daily basic.             REVIEW OF SYSTEMS:    CONSTITUTIONAL: No fevers or chills, + weakness, + fatigue.   EYES/ENT: No visual changes;  No vertigo or throat pain.   NECK: No pain or stiffness. No dysphagia/odynophagia.   RESPIRATORY: No cough, wheezing, hemoptysis; No shortness of breath.  CARDIOVASCULAR: No chest pain or palpitations.   GASTROINTESTINAL: + mild diffuse abdominal pain. No nausea, vomiting, or hematemesis. + constipation. No melena or hematochezia.  GENITOURINARY: No dysuria, frequency or hematuria.   NEUROLOGICAL: + weakness b/l LE but sensation intact.   SKIN: No itching, rashes.        Interval event:  No acute event overnight. Patient was given fleet enema last night with multiple laxatives and now having multiple loose stools.    Rectal exam did not feels any hard stools in rectal vault. Patient has small amount red blood mixing with brown stool.         PAST MEDICAL & SURGICAL HISTORY:  Hypertension  Suprapubic catheter  Pressure ulcer  Diabetes  Lumbar compression fracture  S/P debridement  Toe amputation status, right  H/O hernia repair      Allergies  sulfamethizole (Unknown)          MEDICATIONS  (STANDING):  ALPRAZolam 2 milliGRAM(s) Oral at bedtime  diazepam    Tablet 10 milliGRAM(s) Oral two times a day  docusate sodium 100 milliGRAM(s) Oral two times a day  enoxaparin Injectable 40 milliGRAM(s) SubCutaneous daily  gabapentin 800 milliGRAM(s) Oral three times a day  lactulose Syrup 20 Gram(s) Oral at bedtime  lisinopril 20 milliGRAM(s) Oral daily  methadone    Tablet 80 milliGRAM(s) Oral four times a day  oxybutynin 10 milliGRAM(s) Oral daily  pantoprazole    Tablet 40 milliGRAM(s) Oral before breakfast  senna 2 Tablet(s) Oral at bedtime    MEDICATIONS  (PRN):  acetaminophen   Tablet 650 milliGRAM(s) Oral every 6 hours PRN For Temp greater than 38 C (100.4 F)        Vital Signs Last 24 Hrs  T(C): 36.1 (16 Aug 2018 05:27), Max: 36.6 (15 Aug 2018 23:39)  T(F): 96.9 (16 Aug 2018 05:27), Max: 97.8 (15 Aug 2018 23:39)  HR: 84 (16 Aug 2018 05:27) (81 - 84)  BP: 105/65 (16 Aug 2018 05:27) (105/65 - 119/67)  BP(mean): --  RR: 18 (16 Aug 2018 05:27) (18 - 18)  SpO2: 96% (16 Aug 2018 00:00) (96% - 99%)  CAPILLARY BLOOD GLUCOSE  91 (16 Aug 2018 06:30)  89 (15 Aug 2018 23:39)      I&O's Summary    15 Aug 2018 07:01  -  16 Aug 2018 07:00  --------------------------------------------------------  IN: 0 mL / OUT: 1500 mL / NET: -1500 mL        Physical Exam:    -      General : Not in acute distress.     -      HEENT: PEERLA, EOMI, No thyromegaly.     -      Cardiac: S1 & S2. No murmur/gallop/rubs.     -      Pulm: clear to auscultate b/l lung field. No wheeze.     -      GI: positive BS. No rigidity/rebound. Moderate distended abdomen. Mild diffuse tenderness on palpation. Hypoactive bowel sound.     -      Musculoskeletal: no pitting edema. b/l LE muscle atrophy.     -      Skin: Intact. + multiple decubitus ulcers in sacral area.     -      Neuro: A & O3. Non focal.           Labs:                        9.0    13.74 )-----------( 662      ( 15 Aug 2018 09:30 )             29.8                           08-15    138  |  101  |  15  ----------------------------<  107<H>  5.6<H>   |  22  |  0.7    Ca    8.1<L>      15 Aug 2018 09:30    TPro  5.9<L>  /  Alb  2.0<L>  /  TBili  <0.2  /  DBili  <0.2  /  AST  23  /  ALT  11  /  AlkPhos  122<H>  08-15    LIVER FUNCTIONS - ( 15 Aug 2018 09:30 )  Alb: 2.0 g/dL / Pro: 5.9 g/dL / ALK PHOS: 122 U/L / ALT: 11 U/L / AST: 23 U/L / GGT: x                                                         Urinalysis Basic - ( 15 Aug 2018 09:30 )    Color: Yellow / Appearance: Cloudy / S.025 / pH: x  Gluc: x / Ketone: Negative  / Bili: Negative / Urobili: 0.2 mg/dL   Blood: x / Protein: >=300 mg/dL / Nitrite: Negative   Leuk Esterase: Moderate / RBC: 5-10 /HPF / WBC >50 /HPF   Sq Epi: x / Non Sq Epi: Occasional /HPF / Bacteria: x              Imaging:    CT abdo/pelvic IV contrast 8/15/18:   Since 2018,    Large colonic fecal load with interval increase in small abdominopelvic ascites and mesenteric edema. No pneumoperitoneum.  Interval resolution of small bowel dilatation. Improving bilobar airspace consolidations.      CT abdo/pelvic with PO and IV contrast 18:  Dilated loops of small bowel measuring up to 3.8 cm. Questionable gradual transition in the right lower quadrant.

## 2018-08-17 LAB
-  AMIKACIN: SIGNIFICANT CHANGE UP
-  AMIKACIN: SIGNIFICANT CHANGE UP
-  AMOXICILLIN/CLAVULANIC ACID: SIGNIFICANT CHANGE UP
-  AMPICILLIN/SULBACTAM: SIGNIFICANT CHANGE UP
-  AMPICILLIN: SIGNIFICANT CHANGE UP
-  AMPICILLIN: SIGNIFICANT CHANGE UP
-  AZTREONAM: SIGNIFICANT CHANGE UP
-  AZTREONAM: SIGNIFICANT CHANGE UP
-  CEFAZOLIN: SIGNIFICANT CHANGE UP
-  CEFEPIME: SIGNIFICANT CHANGE UP
-  CEFEPIME: SIGNIFICANT CHANGE UP
-  CEFOXITIN: SIGNIFICANT CHANGE UP
-  CEFTAZIDIME: SIGNIFICANT CHANGE UP
-  CEFTRIAXONE: SIGNIFICANT CHANGE UP
-  CIPROFLOXACIN: SIGNIFICANT CHANGE UP
-  ERTAPENEM: SIGNIFICANT CHANGE UP
-  GENTAMICIN: SIGNIFICANT CHANGE UP
-  GENTAMICIN: SIGNIFICANT CHANGE UP
-  IMIPENEM: SIGNIFICANT CHANGE UP
-  IMIPENEM: SIGNIFICANT CHANGE UP
-  LEVOFLOXACIN: SIGNIFICANT CHANGE UP
-  LINEZOLID: SIGNIFICANT CHANGE UP
-  MEROPENEM: SIGNIFICANT CHANGE UP
-  MEROPENEM: SIGNIFICANT CHANGE UP
-  NITROFURANTOIN: SIGNIFICANT CHANGE UP
-  NITROFURANTOIN: SIGNIFICANT CHANGE UP
-  PIPERACILLIN/TAZOBACTAM: SIGNIFICANT CHANGE UP
-  PIPERACILLIN/TAZOBACTAM: SIGNIFICANT CHANGE UP
-  TETRACYCLINE: SIGNIFICANT CHANGE UP
-  TIGECYCLINE: SIGNIFICANT CHANGE UP
-  TOBRAMYCIN: SIGNIFICANT CHANGE UP
-  TOBRAMYCIN: SIGNIFICANT CHANGE UP
-  TRIMETHOPRIM/SULFAMETHOXAZOLE: SIGNIFICANT CHANGE UP
-  VANCOMYCIN: SIGNIFICANT CHANGE UP
ANION GAP SERPL CALC-SCNC: 16 MMOL/L — HIGH (ref 7–14)
BUN SERPL-MCNC: 16 MG/DL — SIGNIFICANT CHANGE UP (ref 10–20)
CALCIUM SERPL-MCNC: 7.9 MG/DL — LOW (ref 8.5–10.1)
CHLORIDE SERPL-SCNC: 96 MMOL/L — LOW (ref 98–110)
CO2 SERPL-SCNC: 28 MMOL/L — SIGNIFICANT CHANGE UP (ref 17–32)
CREAT SERPL-MCNC: 0.8 MG/DL — SIGNIFICANT CHANGE UP (ref 0.7–1.5)
CULTURE RESULTS: SIGNIFICANT CHANGE UP
GLUCOSE SERPL-MCNC: 54 MG/DL — LOW (ref 70–99)
HCT VFR BLD CALC: 26.8 % — LOW (ref 42–52)
HGB BLD-MCNC: 8 G/DL — LOW (ref 14–18)
MCHC RBC-ENTMCNC: 24.2 PG — LOW (ref 27–31)
MCHC RBC-ENTMCNC: 29.9 G/DL — LOW (ref 32–37)
MCV RBC AUTO: 81 FL — SIGNIFICANT CHANGE UP (ref 80–94)
METHOD TYPE: SIGNIFICANT CHANGE UP
NRBC # BLD: 0 /100 WBCS — SIGNIFICANT CHANGE UP (ref 0–0)
ORGANISM # SPEC MICROSCOPIC CNT: SIGNIFICANT CHANGE UP
PLATELET # BLD AUTO: 615 K/UL — HIGH (ref 130–400)
POTASSIUM SERPL-MCNC: 4.5 MMOL/L — SIGNIFICANT CHANGE UP (ref 3.5–5)
POTASSIUM SERPL-SCNC: 4.5 MMOL/L — SIGNIFICANT CHANGE UP (ref 3.5–5)
RBC # BLD: 3.31 M/UL — LOW (ref 4.7–6.1)
RBC # FLD: 17.6 % — HIGH (ref 11.5–14.5)
SODIUM SERPL-SCNC: 140 MMOL/L — SIGNIFICANT CHANGE UP (ref 135–146)
SPECIMEN SOURCE: SIGNIFICANT CHANGE UP
WBC # BLD: 12.85 K/UL — HIGH (ref 4.8–10.8)
WBC # FLD AUTO: 12.85 K/UL — HIGH (ref 4.8–10.8)

## 2018-08-17 RX ORDER — PIPERACILLIN AND TAZOBACTAM 4; .5 G/20ML; G/20ML
3.38 INJECTION, POWDER, LYOPHILIZED, FOR SOLUTION INTRAVENOUS EVERY 8 HOURS
Qty: 0 | Refills: 0 | Status: DISCONTINUED | OUTPATIENT
Start: 2018-08-17 | End: 2018-08-18

## 2018-08-17 RX ORDER — VANCOMYCIN HCL 1 G
750 VIAL (EA) INTRAVENOUS EVERY 12 HOURS
Qty: 0 | Refills: 0 | Status: DISCONTINUED | OUTPATIENT
Start: 2018-08-18 | End: 2018-08-18

## 2018-08-17 RX ORDER — SODIUM CHLORIDE 9 MG/ML
500 INJECTION INTRAMUSCULAR; INTRAVENOUS; SUBCUTANEOUS ONCE
Qty: 0 | Refills: 0 | Status: COMPLETED | OUTPATIENT
Start: 2018-08-17 | End: 2018-08-17

## 2018-08-17 RX ORDER — VANCOMYCIN HCL 1 G
750 VIAL (EA) INTRAVENOUS ONCE
Qty: 0 | Refills: 0 | Status: COMPLETED | OUTPATIENT
Start: 2018-08-17 | End: 2018-08-17

## 2018-08-17 RX ORDER — VANCOMYCIN HCL 1 G
VIAL (EA) INTRAVENOUS
Qty: 0 | Refills: 0 | Status: DISCONTINUED | OUTPATIENT
Start: 2018-08-17 | End: 2018-08-18

## 2018-08-17 RX ADMIN — GABAPENTIN 800 MILLIGRAM(S): 400 CAPSULE ORAL at 21:35

## 2018-08-17 RX ADMIN — METHADONE HYDROCHLORIDE 80 MILLIGRAM(S): 40 TABLET ORAL at 18:28

## 2018-08-17 RX ADMIN — LISINOPRIL 20 MILLIGRAM(S): 2.5 TABLET ORAL at 06:03

## 2018-08-17 RX ADMIN — Medication 250 MILLIGRAM(S): at 18:37

## 2018-08-17 RX ADMIN — Medication 10 MILLIGRAM(S): at 06:02

## 2018-08-17 RX ADMIN — PIPERACILLIN AND TAZOBACTAM 200 GRAM(S): 4; .5 INJECTION, POWDER, LYOPHILIZED, FOR SOLUTION INTRAVENOUS at 21:39

## 2018-08-17 RX ADMIN — Medication 10 MILLIGRAM(S): at 18:28

## 2018-08-17 RX ADMIN — METHADONE HYDROCHLORIDE 80 MILLIGRAM(S): 40 TABLET ORAL at 12:06

## 2018-08-17 RX ADMIN — Medication 2 MILLIGRAM(S): at 21:38

## 2018-08-17 RX ADMIN — Medication 1 APPLICATION(S): at 23:37

## 2018-08-17 RX ADMIN — GABAPENTIN 800 MILLIGRAM(S): 400 CAPSULE ORAL at 06:03

## 2018-08-17 RX ADMIN — GABAPENTIN 800 MILLIGRAM(S): 400 CAPSULE ORAL at 12:09

## 2018-08-17 RX ADMIN — SODIUM CHLORIDE 500 MILLILITER(S): 9 INJECTION INTRAMUSCULAR; INTRAVENOUS; SUBCUTANEOUS at 16:56

## 2018-08-17 RX ADMIN — Medication 100 MILLIGRAM(S): at 06:03

## 2018-08-17 RX ADMIN — PIPERACILLIN AND TAZOBACTAM 200 GRAM(S): 4; .5 INJECTION, POWDER, LYOPHILIZED, FOR SOLUTION INTRAVENOUS at 18:36

## 2018-08-17 RX ADMIN — METHADONE HYDROCHLORIDE 80 MILLIGRAM(S): 40 TABLET ORAL at 06:03

## 2018-08-17 RX ADMIN — Medication 1 APPLICATION(S): at 05:58

## 2018-08-17 RX ADMIN — Medication 10 MILLIGRAM(S): at 12:07

## 2018-08-17 RX ADMIN — METHADONE HYDROCHLORIDE 80 MILLIGRAM(S): 40 TABLET ORAL at 23:39

## 2018-08-17 NOTE — DIETITIAN INITIAL EVALUATION ADULT. - PHYSICAL APPEARANCE
well nourished/alert and oriented, VERY chatty. BMI: 22.3 (165#/72: stated by pt), IBW: 178#, UBW: 180#, Manny 13' stage 4 pressure ulcer to sacrum, stage 4 to B/L buttocks, stage 4 to L knee, unstageable to R leg alert and oriented, VERY chatty. BMI: 21.2 (157#/72" stated by pt), IBW: 178#, UBW: 180#, Manny 13' stage 4 pressure ulcer to sacrum, stage 4 to B/L buttocks, stage 4 to L knee, unstageable to R leg/well nourished

## 2018-08-17 NOTE — DIETITIAN INITIAL EVALUATION ADULT. - ENERGY NEEDS
Estimated Calorie Needs: 7944-2214 kcal/day (MSJ x 1.2-1.3 AF)--increased needs d/t multiple pressure ulcers  Estimated Protein Needs:  gm/day (1.2-1.5 gm/kg lowest wt this adm)  Estimated Fluid Needs: 1 ml/kcal
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
## 2018-08-17 NOTE — DIETITIAN INITIAL EVALUATION ADULT. - OTHER INFO
Recently admitted in [facility] on 26 July for paralytic ileus sec to opioid over dose and was d/ricco on 8/5. Now presents to ED for 4d hx of worsening diffuse abdominal pain with diarrhea and decreased appetite likely 2/2 opioid/stress induced ileus with fecal impaction. Per GI,  recommend high colonic enema; may consider daily peripheral opioid antagonist outpatient if patient still constipated. Reason for Assessment: C/s for pressure ulcer stage 2 or greater

## 2018-08-17 NOTE — DIETITIAN INITIAL EVALUATION ADULT. - ORAL INTAKE PTA
Pt lost his dad in June and since then has had severely decreased appetite, however it just recently came back. Pt occasionally drinks Nutrament and takes probiotics. Doesn't follow any specific diet at home and has NKFA./good

## 2018-08-18 LAB
ANION GAP SERPL CALC-SCNC: 11 MMOL/L — SIGNIFICANT CHANGE UP (ref 7–14)
BASOPHILS # BLD AUTO: 0.14 K/UL — SIGNIFICANT CHANGE UP (ref 0–0.2)
BASOPHILS NFR BLD AUTO: 0.9 % — SIGNIFICANT CHANGE UP (ref 0–1)
BUN SERPL-MCNC: 25 MG/DL — HIGH (ref 10–20)
CALCIUM SERPL-MCNC: 7.7 MG/DL — LOW (ref 8.5–10.1)
CHLORIDE SERPL-SCNC: 100 MMOL/L — SIGNIFICANT CHANGE UP (ref 98–110)
CO2 SERPL-SCNC: 27 MMOL/L — SIGNIFICANT CHANGE UP (ref 17–32)
CREAT SERPL-MCNC: 1.3 MG/DL — SIGNIFICANT CHANGE UP (ref 0.7–1.5)
EOSINOPHIL # BLD AUTO: 0.08 K/UL — SIGNIFICANT CHANGE UP (ref 0–0.7)
EOSINOPHIL NFR BLD AUTO: 0.5 % — SIGNIFICANT CHANGE UP (ref 0–8)
GLUCOSE SERPL-MCNC: 85 MG/DL — SIGNIFICANT CHANGE UP (ref 70–99)
HCT VFR BLD CALC: 24 % — LOW (ref 42–52)
HGB BLD-MCNC: 7.3 G/DL — CRITICAL LOW (ref 14–18)
IMM GRANULOCYTES NFR BLD AUTO: 0.5 % — HIGH (ref 0.1–0.3)
LYMPHOCYTES # BLD AUTO: 20.6 % — SIGNIFICANT CHANGE UP (ref 20.5–51.1)
LYMPHOCYTES # BLD AUTO: 3.26 K/UL — SIGNIFICANT CHANGE UP (ref 1.2–3.4)
MAGNESIUM SERPL-MCNC: 1.5 MG/DL — LOW (ref 1.8–2.4)
MCHC RBC-ENTMCNC: 24.3 PG — LOW (ref 27–31)
MCHC RBC-ENTMCNC: 30.4 G/DL — LOW (ref 32–37)
MCV RBC AUTO: 80 FL — SIGNIFICANT CHANGE UP (ref 80–94)
MONOCYTES # BLD AUTO: 0.91 K/UL — HIGH (ref 0.1–0.6)
MONOCYTES NFR BLD AUTO: 5.8 % — SIGNIFICANT CHANGE UP (ref 1.7–9.3)
NEUTROPHILS # BLD AUTO: 11.33 K/UL — HIGH (ref 1.4–6.5)
NEUTROPHILS NFR BLD AUTO: 71.7 % — SIGNIFICANT CHANGE UP (ref 42.2–75.2)
NRBC # BLD: 0 /100 WBCS — SIGNIFICANT CHANGE UP (ref 0–0)
PHOSPHATE SERPL-MCNC: 4.8 MG/DL — SIGNIFICANT CHANGE UP (ref 2.1–4.9)
PLATELET # BLD AUTO: 481 K/UL — HIGH (ref 130–400)
POTASSIUM SERPL-MCNC: 3.8 MMOL/L — SIGNIFICANT CHANGE UP (ref 3.5–5)
POTASSIUM SERPL-SCNC: 3.8 MMOL/L — SIGNIFICANT CHANGE UP (ref 3.5–5)
RBC # BLD: 3 M/UL — LOW (ref 4.7–6.1)
RBC # FLD: 17.8 % — HIGH (ref 11.5–14.5)
SODIUM SERPL-SCNC: 138 MMOL/L — SIGNIFICANT CHANGE UP (ref 135–146)
WBC # BLD: 15.8 K/UL — HIGH (ref 4.8–10.8)
WBC # FLD AUTO: 15.8 K/UL — HIGH (ref 4.8–10.8)

## 2018-08-18 RX ORDER — LINEZOLID 600 MG/300ML
600 INJECTION, SOLUTION INTRAVENOUS ONCE
Qty: 0 | Refills: 0 | Status: COMPLETED | OUTPATIENT
Start: 2018-08-18 | End: 2018-08-18

## 2018-08-18 RX ORDER — INSULIN LISPRO 100/ML
15 VIAL (ML) SUBCUTANEOUS ONCE
Qty: 0 | Refills: 0 | Status: DISCONTINUED | OUTPATIENT
Start: 2018-08-18 | End: 2018-08-19

## 2018-08-18 RX ORDER — LINEZOLID 600 MG/300ML
INJECTION, SOLUTION INTRAVENOUS
Qty: 0 | Refills: 0 | Status: DISCONTINUED | OUTPATIENT
Start: 2018-08-18 | End: 2018-08-20

## 2018-08-18 RX ORDER — SODIUM CHLORIDE 9 MG/ML
1000 INJECTION INTRAMUSCULAR; INTRAVENOUS; SUBCUTANEOUS
Qty: 0 | Refills: 0 | Status: DISCONTINUED | OUTPATIENT
Start: 2018-08-18 | End: 2018-08-20

## 2018-08-18 RX ORDER — MEROPENEM 1 G/30ML
INJECTION INTRAVENOUS
Qty: 0 | Refills: 0 | Status: DISCONTINUED | OUTPATIENT
Start: 2018-08-18 | End: 2018-08-20

## 2018-08-18 RX ORDER — MEROPENEM 1 G/30ML
1000 INJECTION INTRAVENOUS ONCE
Qty: 0 | Refills: 0 | Status: COMPLETED | OUTPATIENT
Start: 2018-08-18 | End: 2018-08-18

## 2018-08-18 RX ORDER — SODIUM CHLORIDE 9 MG/ML
500 INJECTION INTRAMUSCULAR; INTRAVENOUS; SUBCUTANEOUS ONCE
Qty: 0 | Refills: 0 | Status: COMPLETED | OUTPATIENT
Start: 2018-08-18 | End: 2018-08-18

## 2018-08-18 RX ORDER — LINEZOLID 600 MG/300ML
600 INJECTION, SOLUTION INTRAVENOUS EVERY 12 HOURS
Qty: 0 | Refills: 0 | Status: DISCONTINUED | OUTPATIENT
Start: 2018-08-19 | End: 2018-08-20

## 2018-08-18 RX ORDER — MEROPENEM 1 G/30ML
1000 INJECTION INTRAVENOUS EVERY 8 HOURS
Qty: 0 | Refills: 0 | Status: DISCONTINUED | OUTPATIENT
Start: 2018-08-19 | End: 2018-08-20

## 2018-08-18 RX ADMIN — Medication 10 MILLIGRAM(S): at 13:40

## 2018-08-18 RX ADMIN — METHADONE HYDROCHLORIDE 80 MILLIGRAM(S): 40 TABLET ORAL at 23:19

## 2018-08-18 RX ADMIN — GABAPENTIN 800 MILLIGRAM(S): 400 CAPSULE ORAL at 05:25

## 2018-08-18 RX ADMIN — Medication 10 MILLIGRAM(S): at 05:25

## 2018-08-18 RX ADMIN — GABAPENTIN 800 MILLIGRAM(S): 400 CAPSULE ORAL at 13:41

## 2018-08-18 RX ADMIN — GABAPENTIN 800 MILLIGRAM(S): 400 CAPSULE ORAL at 21:22

## 2018-08-18 RX ADMIN — LINEZOLID 300 MILLIGRAM(S): 600 INJECTION, SOLUTION INTRAVENOUS at 22:30

## 2018-08-18 RX ADMIN — Medication 250 MILLIGRAM(S): at 09:45

## 2018-08-18 RX ADMIN — PIPERACILLIN AND TAZOBACTAM 200 GRAM(S): 4; .5 INJECTION, POWDER, LYOPHILIZED, FOR SOLUTION INTRAVENOUS at 13:43

## 2018-08-18 RX ADMIN — PIPERACILLIN AND TAZOBACTAM 200 GRAM(S): 4; .5 INJECTION, POWDER, LYOPHILIZED, FOR SOLUTION INTRAVENOUS at 05:25

## 2018-08-18 RX ADMIN — Medication 250 MILLIGRAM(S): at 18:54

## 2018-08-18 RX ADMIN — Medication 2 MILLIGRAM(S): at 21:22

## 2018-08-18 RX ADMIN — ENOXAPARIN SODIUM 40 MILLIGRAM(S): 100 INJECTION SUBCUTANEOUS at 13:41

## 2018-08-18 RX ADMIN — METHADONE HYDROCHLORIDE 80 MILLIGRAM(S): 40 TABLET ORAL at 06:07

## 2018-08-18 RX ADMIN — Medication 1 APPLICATION(S): at 05:31

## 2018-08-18 RX ADMIN — SODIUM CHLORIDE 1000 MILLILITER(S): 9 INJECTION INTRAMUSCULAR; INTRAVENOUS; SUBCUTANEOUS at 15:30

## 2018-08-18 RX ADMIN — METHADONE HYDROCHLORIDE 20 MILLIGRAM(S): 40 TABLET ORAL at 13:39

## 2018-08-18 RX ADMIN — MEROPENEM 100 MILLIGRAM(S): 1 INJECTION INTRAVENOUS at 21:29

## 2018-08-18 NOTE — PROGRESS NOTE ADULT - ASSESSMENT
56yo M with Past Medical History DM, Bilateral lower extremity weakness secondary previous lumbar trauma, subsequent sacral decubitis and heel ulcers, suprapubic cath, admitted with diffuse abdominal pain x4 days secondary to severe constipation/stercocolitis.    Abdominal pain secondary to severe constipation complicated by overflow diarrhea: abdominal examination was benign this afternoon.  Continue supportive care with Senna/Colace.  Add Miralax to achieve daily bowel movements.  Patient declined to undergo diagnostic colonoscopy.  History of Urinary Incontinence/suprapubic catheter: patient previously followed with -Dr. Swanson.  Continue suprapubic to drainage. Check bladder scan to assess for retention.  + UA is likely due to chronic colonization.  Consider starting empiric abx due to worsening WBC and relative hypotension.  Follow-up Infectious Disease recommendations  Recent family death/depression: patient was speaking about family ~30 minutes.  Psychiatry evaluation for bereavement and failure to thrive  Opioid dependence: continue Methadone as directed  GI/DVT prophylaxis  DNR/DNI

## 2018-08-18 NOTE — CONSULT NOTE ADULT - ASSESSMENT
#UTI - VRE/ Pseudomonas /Morganella    would recommend:    1. Discontinue Vancomycin and start on Linezolid to cover VRE  2. Change zosyn to Meropenem to cover Pseudomonas and Morganella  3. May change  to oral Levaquin and oral Linezolid on discharge to complete the course  4. Monitor WBC count    will follow the patient with you and make further recommendation based on the clinical course and Lab results  Thank you for the opportunity to participate in Mr. BELLA's care A 56 yo Male with DM, B/L LE weakness 2/2 previous lumbar trauma (able to stand but cannot walk), subsequent sacral decubiti and heel ulcers, suprapubic cath, presents to the ER for evaluation of diffuse abdominal pain . He has no fever or chills, and no dysuria. He found to have positive Urine analysis and culture grew VRE, Pseudomonas and Morganella. He has started on Vancomycin and Zosyn, and the ID consult requested to assist with further antibiotic management.    #UTI - VRE/ Pseudomonas /Morganella    would recommend:    1. Discontinue Vancomycin and start on Linezolid to cover VRE  2. Change zosyn to Meropenem to cover Pseudomonas and Morganella  3. May change  to oral Levaquin and oral Linezolid on discharge to complete the course  4. Monitor WBC count    will follow the patient with you and make further recommendation based on the clinical course and Lab results  Thank you for the opportunity to participate in Mr. BELLA's care

## 2018-08-18 NOTE — CONSULT NOTE ADULT - SUBJECTIVE AND OBJECTIVE BOX
58 yo M with PMHx DM, B/L LE weakness 2/2 previous lumbar trauma (able to stand but cannot walk), subsequent sacral decubiti and heel ulcers, suprapubic cath, p/w diffuse abdominal pain since last 4 days, and dec appetite since last 10 days. As per patient he was d/ricco from hospital on 8/5 he felt better after that but abd pain started 4 days ago, diffuse, intermittent, initially started as 4-6/10 in intensity, sometimes dull in nature, sometimes stabbing pain, not associated with any food intake, associated with loose stools twice a day for last 4 days and also reported 50 to 60 pounds weight loss since June, and loss of appetite. Denies nay chest pian, SOB, fever, chills, nausea, vomiting, headache, dizziness, cough, dysuria.   Vitals in ED : BP: 108/60, 98F, 98, 20, 98% on room air, Labs showed slight inc WBC count (17837), CT abd and pelvis was done showed large fecal load. received 1 litre bolus in ED. ELISABETH was done by ED didn't get any stools.  Recently admitted in [facility] on 26 July for paralytic ileus sec to opioid over dose and was d/ricco on 8/5.    Patient is a 57y old  Male who presents with a chief complaint of Abd pain, diarrhoea, dec appetite (15 Aug 2018 13:56)      INTERVAL HPI/OVERNIGHT EVENTS:  T(C): 37.1 (08-18-18 @ 12:00), Max: 37.2 (08-18-18 @ 05:36)  HR: 93 (08-18-18 @ 12:00) (92 - 97)  BP: 88/58 (08-18-18 @ 12:30) (71/50 - 107/63)  RR: 18 (08-18-18 @ 12:00) (18 - 18)  SpO2: --  Wt(kg): --  I&O's Summary    17 Aug 2018 07:01  -  18 Aug 2018 07:00  --------------------------------------------------------  IN: 0 mL / OUT: 400 mL / NET: -400 mL    18 Aug 2018 07:01  -  18 Aug 2018 16:57  --------------------------------------------------------  IN: 500 mL / OUT: 125 mL / NET: 375 mL        PAST MEDICAL & SURGICAL HISTORY:  Hypertension  Suprapubic catheter  Pressure ulcer  Diabetes  Lumbar compression fracture  S/P debridement  Toe amputation status, right  H/O hernia repair      SOCIAL HISTORY  Alcohol:  Tobacco:  Illicit substance use:      FAMILY HISTORY:      LABS:                        7.3    15.80 )-----------( 481      ( 18 Aug 2018 08:17 )             24.0     08-18    138  |  100  |  25<H>  ----------------------------<  85  3.8   |  27  |  1.3    Ca    7.7<L>      18 Aug 2018 08:17  Phos  4.8     08-18  Mg     1.5     08-18          CAPILLARY BLOOD GLUCOSE  192 (18 Aug 2018 16:33)  95 (18 Aug 2018 11:54)  97 (18 Aug 2018 05:36)  163 (17 Aug 2018 20:54)                MEDICATIONS  (STANDING):  ALPRAZolam 2 milliGRAM(s) Oral at bedtime  Dakins Solution - 1/2 Strength 1 Application(s) Topical every 12 hours  diazepam    Tablet 10 milliGRAM(s) Oral two times a day  docusate sodium 100 milliGRAM(s) Oral two times a day  enoxaparin Injectable 40 milliGRAM(s) SubCutaneous daily  gabapentin 800 milliGRAM(s) Oral three times a day  lactulose Syrup 20 Gram(s) Oral at bedtime  lisinopril 20 milliGRAM(s) Oral daily  methadone    Tablet 80 milliGRAM(s) Oral four times a day  oxybutynin 10 milliGRAM(s) Oral daily  pantoprazole    Tablet 40 milliGRAM(s) Oral before breakfast  piperacillin/tazobactam IVPB. 3.375 Gram(s) IV Intermittent every 8 hours  senna 2 Tablet(s) Oral at bedtime  vancomycin  IVPB      vancomycin  IVPB 750 milliGRAM(s) IV Intermittent every 12 hours    MEDICATIONS  (PRN):  acetaminophen   Tablet 650 milliGRAM(s) Oral every 6 hours PRN For Temp greater than 38 C (100.4 F)      REVIEW OF SYSTEMS:  CONSTITUTIONAL: No fever, weight loss, or fatigue  EYES: No eye pain, visual disturbances, or discharge  ENMT:  No difficulty hearing, tinnitus, vertigo; No sinus or throat pain  NECK: No pain or stiffness  RESPIRATORY: No cough, wheezing, chills or hemoptysis; No shortness of breath  CARDIOVASCULAR: No chest pain, palpitations, dizziness, or leg swelling  GASTROINTESTINAL: No abdominal or epigastric pain. No nausea, vomiting, or hematemesis; No diarrhea or constipation. No melena or hematochezia.  GENITOURINARY: No dysuria, frequency, hematuria, or incontinence  NEUROLOGICAL: No headaches, memory loss, loss of strength, numbness, or tremors  SKIN: No itching, burning, rashes, or lesions   LYMPH NODES: No enlarged glands  ENDOCRINE: No heat or cold intolerance; No hair loss  MUSCULOSKELETAL: No joint pain or swelling; No muscle, back, or extremity pain  PSYCHIATRIC: No depression, anxiety, mood swings, or difficulty sleeping  HEME/LYMPH: No easy bruising, or bleeding gums  ALLERY AND IMMUNOLOGIC: No hives or eczema    RADIOLOGY & ADDITIONAL TESTS:    Imaging Personally Reviewed:  [ ] YES  [ ] NO    Consultant(s) Notes Reviewed:  [ ] YES  [ ] NO    PHYSICAL EXAM:  GENERAL: NAD, well-groomed, well-developed  HEAD:  Atraumatic, Normocephalic  EYES: EOMI, PERRLA, conjunctiva and sclera clear  ENMT: No tonsillar erythema, exudates, or enlargement; Moist mucous membranes, Good dentition, No lesions  NECK: Supple, No JVD, Normal thyroid  NERVOUS SYSTEM:  Alert & Oriented X3, Good concentration; Motor Strength 5/5 B/L upper and lower extremities; DTRs 2+ intact and symmetric  CHEST/LUNG: Clear to percussion bilaterally; No rales, rhonchi, wheezing, or rubs  HEART: Regular rate and rhythm; No murmurs, rubs, or gallops  ABDOMEN: Soft, Nontender, Nondistended; Bowel sounds present  EXTREMITIES:  2+ Peripheral Pulses, No clubbing, cyanosis, or edema  LYMPH: No lymphadenopathy noted  SKIN: No rashes or lesions    Care Discussed with Consultants/Other Providers [ ] YES  [ ] NO Patient is a 57y old  Male who presents with a chief complaint of Abd pain, diarrhoea, dec appetite (15 Aug 2018 13:56)      58 yo M with PMHx DM, B/L LE weakness 2/2 previous lumbar trauma (able to stand but cannot walk), subsequent sacral decubiti and heel ulcers, suprapubic cath, p/w diffuse abdominal pain since last 4 days, and dec appetite since last 10 days. As per patient he was d/ricco from hospital on 8/5 he felt better after that but abd pain started 4 days ago, diffuse, intermittent, initially started as 4-6/10 in intensity, sometimes dull in nature, sometimes stabbing pain, not associated with any food intake, associated with loose stools twice a day for last 4 days and also reported 50 to 60 pounds weight loss since June, and loss of appetite. Denies nay chest pian, SOB, fever, chills, nausea, vomiting, headache, dizziness, cough, dysuria.   Vitals in ED : BP: 108/60, 98F, 98, 20, 98% on room air, Labs showed slight inc WBC count ([id number]), CT abd and pelvis was done showed large fecal load. received 1 litre bolus in ED. ELISABETH was done by ED didn't get any stools.  Recently admitted in [facility] on 26 July for paralytic ileus sec to opioid over dose and was d/ricco on 8/5.      REVIEW OF SYSTEMS:        PAST MEDICAL & SURGICAL HISTORY:  Hypertension  Suprapubic catheter  Pressure ulcer  Diabetes  Lumbar compression fracture  S/P debridement  Toe amputation status, right  H/O hernia repair      SOCIAL HISTORY  Alcohol:  Tobacco:  Illicit substance use:      FAMILY HISTORY: Non contributory to the present illness        ALLERGIES:       T(C): 37.1 (08-18-18 @ 12:00), Max: 37.2 (08-18-18 @ 05:36)  HR: 93 (08-18-18 @ 12:00) (92 - 97)  BP: 88/58 (08-18-18 @ 12:30) (71/50 - 107/63)  RR: 18 (08-18-18 @ 12:00) (18 - 18)  SpO2: --  Wt(kg): --  I&O's Summary          LABS:                        7.3    15.80 )-----------( 481      ( 18 Aug 2018 08:17 )             24.0         08-18    138  |  100  |  25<H>  ----------------------------<  85  3.8   |  27  |  1.3    Ca    7.7<L>      18 Aug 2018 08:17  Phos  4.8     08-18  Mg     1.5     08-18          CAPILLARY BLOOD GLUCOSE  192 (18 Aug 2018 16:33)  95 (18 Aug 2018 11:54)  97 (18 Aug 2018 05:36)  163 (17 Aug 2018 20:54)                MEDICATIONS  (STANDING):  ALPRAZolam 2 milliGRAM(s) Oral at bedtime  Dakins Solution - 1/2 Strength 1 Application(s) Topical every 12 hours  diazepam    Tablet 10 milliGRAM(s) Oral two times a day  docusate sodium 100 milliGRAM(s) Oral two times a day  enoxaparin Injectable 40 milliGRAM(s) SubCutaneous daily  gabapentin 800 milliGRAM(s) Oral three times a day  lactulose Syrup 20 Gram(s) Oral at bedtime  lisinopril 20 milliGRAM(s) Oral daily  methadone    Tablet 80 milliGRAM(s) Oral four times a day  oxybutynin 10 milliGRAM(s) Oral daily  pantoprazole    Tablet 40 milliGRAM(s) Oral before breakfast  piperacillin/tazobactam IVPB. 3.375 Gram(s) IV Intermittent every 8 hours  senna 2 Tablet(s) Oral at bedtime  vancomycin  IVPB      vancomycin  IVPB 750 milliGRAM(s) IV Intermittent every 12 hours    MEDICATIONS  (PRN):  acetaminophen   Tablet 650 milliGRAM(s) Oral every 6 hours PRN For Temp greater than 38 C (100.4 F)            RADIOLOGY & ADDITIONAL TESTS:        PHYSICAL EXAM:  GENERAL: NAD, well-groomed, well-developed  HEAD:  Atraumatic, Normocephalic  EYES: EOMI, PERRLA, conjunctiva and sclera clear  ENMT: No tonsillar erythema, exudates, or enlargement; Moist mucous membranes, Good dentition, No lesions  NECK: Supple, No JVD, Normal thyroid  NERVOUS SYSTEM:  Alert & Oriented X3, Good concentration; Motor Strength 5/5 B/L upper and lower extremities; DTRs 2+ intact and symmetric  CHEST/LUNG: Clear to percussion bilaterally; No rales, rhonchi, wheezing, or rubs  HEART: Regular rate and rhythm; No murmurs, rubs, or gallops  ABDOMEN: Soft, Nontender, Nondistended; Bowel sounds present  EXTREMITIES:  2+ Peripheral Pulses, No clubbing, cyanosis, or edema  LYMPH: No lymphadenopathy noted  SKIN: No rashes or lesions    Care Discussed with Consultants/Other Providers [ ] YES  [ ] NO A 56 yo Male with DM, B/L LE weakness 2/2 previous lumbar trauma (able to stand but cannot walk), subsequent sacral decubiti and heel ulcers, suprapubic cath, presents to the ER for evaluation of diffuse abdominal pain . He has no fever or chills, and no dysuria. He found to have positive Urine analysis and culture grew VRE, Pseudomonas and Morganella. He has started on Vancomycin and Zosyn, and the ID consult requested to assist with further antibiotic management.        REVIEW OF SYSTEMS: Total of twelve systems have been reviewed with patient and found to be negative unless mentioned in HPI          PAST MEDICAL & SURGICAL HISTORY:  Hypertension  Suprapubic catheter  Pressure ulcer  Diabetes  Lumbar compression fracture  S/P debridement  Toe amputation status, right  H/O hernia repair        SOCIAL HISTORY  Alcohol: Does not drink  Tobacco: Does not smoke  Illicit substance use: None        FAMILY HISTORY: Non contributory to the present illness        ALLERGIES: Bactrim      T(C): 37.1 (08-18-18 @ 12:00), Max: 37.2 (08-18-18 @ 05:36)  HR: 93 (08-18-18 @ 12:00) (92 - 97)  BP: 88/58 (08-18-18 @ 12:30) (71/50 - 107/63)  RR: 18 (08-18-18 @ 12:00) (18 - 18)  SpO2: --  Wt(kg): --  I&O's Summary      PHYSICAL EXAM:  GENERAL: Not in distress  CVS: s1 and s2 present  RESP: Air  entry B/L  GI: Abdomen non distended and NT  EXT: No pedal edema, positive UE Tattoos   CNS: Awake and Alert        LABS:                        7.3    15.80 )-----------( 481      ( 18 Aug 2018 08:17 )             24.0         08-18    138  |  100  |  25<H>  ----------------------------<  85  3.8   |  27  |  1.3    Ca    7.7<L>      18 Aug 2018 08:17  Phos  4.8     08-18  Mg     1.5     08-18          CAPILLARY BLOOD GLUCOSE  192 (18 Aug 2018 16:33)  95 (18 Aug 2018 11:54)  97 (18 Aug 2018 05:36)  163 (17 Aug 2018 20:54)        MEDICATIONS  (STANDING):  ALPRAZolam 2 milliGRAM(s) Oral at bedtime  Dakins Solution - 1/2 Strength 1 Application(s) Topical every 12 hours  diazepam    Tablet 10 milliGRAM(s) Oral two times a day  docusate sodium 100 milliGRAM(s) Oral two times a day  enoxaparin Injectable 40 milliGRAM(s) SubCutaneous daily  gabapentin 800 milliGRAM(s) Oral three times a day  lactulose Syrup 20 Gram(s) Oral at bedtime  lisinopril 20 milliGRAM(s) Oral daily  methadone    Tablet 80 milliGRAM(s) Oral four times a day  oxybutynin 10 milliGRAM(s) Oral daily  pantoprazole    Tablet 40 milliGRAM(s) Oral before breakfast  piperacillin/tazobactam IVPB. 3.375 Gram(s) IV Intermittent every 8 hours  senna 2 Tablet(s) Oral at bedtime  vancomycin  IVPB      vancomycin  IVPB 750 milliGRAM(s) IV Intermittent every 12 hours    MEDICATIONS  (PRN):  acetaminophen   Tablet 650 milliGRAM(s) Oral every 6 hours PRN For Temp greater than 38 C (100.4 F)        RADIOLOGY & ADDITIONAL TESTS:    < from: CT Abdomen and Pelvis w/ IV Cont (08.15.18 @ 11:53) >  1. Large colonic fecal load with interval increase in small   abdominopelvic ascites and mesenteric edema. No pneumoperitoneum.    2. Improving bilobar airspace consolidations.      < end of copied text >      MICROBIOLOGY DATA:      Culture - Blood (08.17.18 @ 20:35)    Specimen Source: .Blood None    Culture Results:   No growth to date.      Urine Microscopic-Add On (NC) (08.15.18 @ 09:30)    Epithelial Cells: Occasional /HPF    Red Blood Cell - Urine: 5-10 /HPF    White Blood Cell - Urine: >50 /HPF    Culture - Urine (08.15.18 @ 09:30)    -  Amikacin: S <=8    -  Amikacin: S <=8    -  Amoxicillin/Clavulanic Acid: R >16/8    -  Ampicillin: R >16 These ampicillin results predict results for amoxicillin    -  Ampicillin: R >8 Predicts results to ampicillin/sulbactam, amoxacillin-clavulanate and  piperacillin-tazobactam.    -  Ampicillin/Sulbactam: R >16/8    -  Aztreonam: I 16    -  Aztreonam: S <=4    -  Cefazolin: R >16    -  Cefepime: S 8    -  Cefepime: S <=2    -  Cefoxitin: S <=4    -  Ceftazidime: S 4    -  Ceftriaxone: R 32 Enterobacter, Citrobacter, and Serratia may develop resistance during prolonged therapy    -  Ciprofloxacin: S <=0.5    -  Ciprofloxacin: S <=0.5    -  Ciprofloxacin: R >2    -  Ertapenem: S <=0.5    -  Gentamicin: S 2    -  Gentamicin: S <=1    -  Imipenem: S <=1    -  Imipenem: I 2    -  Levofloxacin: S 2    -  Levofloxacin: S <=1    -  Levofloxacin: R >4    -  Linezolid: S 2    -  Meropenem: S 2    -  Meropenem: S <=1    -  Nitrofurantoin: R >64 Should not be used to treat pyelonephritis    -  Nitrofurantoin: I 64 Should not be used to treat pyelonephritis.    -  Piperacillin/Tazobactam: S 16    -  Piperacillin/Tazobactam: S <=8    -  Tetra/Doxy: R >8    -  Tigecycline: R 2    -  Tobramycin: S <=2    -  Tobramycin: S <=2    -  Trimethoprim/Sulfamethoxazole: S <=0.5/9.5    -  Vancomycin: R >16    Specimen Source: .Urine Suprapubic    Culture Results:   Numerous Morganella morganii  Numerous Pseudomonas aeruginosa  Moderate Enterococcus faecium (vancomycin resistant)    Organism Identification: Morganella morganii  Pseudomonas aeruginosa  Enterococcus faecium (vancomycin resistant)    Organism: Morganella morganii    Organism: Pseudomonas aeruginosa    Organism: Enterococcus faecium (vancomycin resistant)    Method Type: ALLISON    Method Type: ALLISON    Method Type: ALLISON

## 2018-08-19 LAB
ANION GAP SERPL CALC-SCNC: 16 MMOL/L — HIGH (ref 7–14)
BUN SERPL-MCNC: 20 MG/DL — SIGNIFICANT CHANGE UP (ref 10–20)
CALCIUM SERPL-MCNC: 8 MG/DL — LOW (ref 8.5–10.1)
CHLORIDE SERPL-SCNC: 94 MMOL/L — LOW (ref 98–110)
CO2 SERPL-SCNC: 23 MMOL/L — SIGNIFICANT CHANGE UP (ref 17–32)
CREAT SERPL-MCNC: 1.1 MG/DL — SIGNIFICANT CHANGE UP (ref 0.7–1.5)
GLUCOSE SERPL-MCNC: 57 MG/DL — LOW (ref 70–99)
HCT VFR BLD CALC: 27 % — LOW (ref 42–52)
HGB BLD-MCNC: 8.3 G/DL — LOW (ref 14–18)
MCHC RBC-ENTMCNC: 24.9 PG — LOW (ref 27–31)
MCHC RBC-ENTMCNC: 30.7 G/DL — LOW (ref 32–37)
MCV RBC AUTO: 80.8 FL — SIGNIFICANT CHANGE UP (ref 80–94)
NRBC # BLD: 0 /100 WBCS — SIGNIFICANT CHANGE UP (ref 0–0)
PLATELET # BLD AUTO: 486 K/UL — HIGH (ref 130–400)
POTASSIUM SERPL-MCNC: 4.1 MMOL/L — SIGNIFICANT CHANGE UP (ref 3.5–5)
POTASSIUM SERPL-SCNC: 4.1 MMOL/L — SIGNIFICANT CHANGE UP (ref 3.5–5)
RBC # BLD: 3.34 M/UL — LOW (ref 4.7–6.1)
RBC # FLD: 17.7 % — HIGH (ref 11.5–14.5)
SODIUM SERPL-SCNC: 133 MMOL/L — LOW (ref 135–146)
WBC # BLD: 22.19 K/UL — HIGH (ref 4.8–10.8)
WBC # FLD AUTO: 22.19 K/UL — HIGH (ref 4.8–10.8)

## 2018-08-19 RX ORDER — OXYCODONE HYDROCHLORIDE 5 MG/1
30 TABLET ORAL
Qty: 0 | Refills: 0 | Status: DISCONTINUED | OUTPATIENT
Start: 2018-08-19 | End: 2018-08-20

## 2018-08-19 RX ORDER — KETOROLAC TROMETHAMINE 30 MG/ML
15 SYRINGE (ML) INJECTION ONCE
Qty: 0 | Refills: 0 | Status: DISCONTINUED | OUTPATIENT
Start: 2018-08-19 | End: 2018-08-19

## 2018-08-19 RX ORDER — POLYETHYLENE GLYCOL 3350 17 G/17G
17 POWDER, FOR SOLUTION ORAL
Qty: 0 | Refills: 0 | Status: DISCONTINUED | OUTPATIENT
Start: 2018-08-19 | End: 2018-08-20

## 2018-08-19 RX ADMIN — METHADONE HYDROCHLORIDE 80 MILLIGRAM(S): 40 TABLET ORAL at 18:24

## 2018-08-19 RX ADMIN — GABAPENTIN 800 MILLIGRAM(S): 400 CAPSULE ORAL at 22:48

## 2018-08-19 RX ADMIN — Medication 2 MILLIGRAM(S): at 22:48

## 2018-08-19 RX ADMIN — Medication 1 APPLICATION(S): at 18:05

## 2018-08-19 RX ADMIN — MEROPENEM 100 MILLIGRAM(S): 1 INJECTION INTRAVENOUS at 13:19

## 2018-08-19 RX ADMIN — LINEZOLID 300 MILLIGRAM(S): 600 INJECTION, SOLUTION INTRAVENOUS at 06:18

## 2018-08-19 RX ADMIN — GABAPENTIN 800 MILLIGRAM(S): 400 CAPSULE ORAL at 13:19

## 2018-08-19 RX ADMIN — ENOXAPARIN SODIUM 40 MILLIGRAM(S): 100 INJECTION SUBCUTANEOUS at 11:01

## 2018-08-19 RX ADMIN — METHADONE HYDROCHLORIDE 80 MILLIGRAM(S): 40 TABLET ORAL at 11:02

## 2018-08-19 RX ADMIN — Medication 10 MILLIGRAM(S): at 18:23

## 2018-08-19 RX ADMIN — GABAPENTIN 800 MILLIGRAM(S): 400 CAPSULE ORAL at 05:22

## 2018-08-19 RX ADMIN — MEROPENEM 100 MILLIGRAM(S): 1 INJECTION INTRAVENOUS at 05:23

## 2018-08-19 NOTE — PROGRESS NOTE ADULT - ASSESSMENT
56yo M with Past Medical History DM, Bilateral lower extremity weakness secondary previous lumbar trauma, subsequent sacral decubitis and heel ulcers, suprapubic cath, admitted with diffuse abdominal pain x4 days secondary to severe constipation/stercocolitis.    Abdominal pain secondary to severe constipation complicated by overflow diarrhea: constipation resolved following enema.  Continue Senna/Colace.  Miralax PRN for persistent constipation.  Patient declined to undergo diagnostic colonoscopy.  History of Urinary Incontinence/suprapubic catheter: patient previously followed with -Dr. Swanson.  Continue suprapubic to drainage.  Urine cultures were positive for multiple resistant organisms.  Infectious Disease recommendations reviewed; continue treatment with IV Merrem and Zyvox.  Recent family death/depression: patient was speaking about family ~30 minutes.  Psychiatry evaluation for bereavement and failure to thrive  Opioid dependence: continue Methadone as directed.  Pt was restarted on his home dosage of Oxycodone IR for breakthrough pain  GI/DVT prophylaxis  DNR/DNI

## 2018-08-19 NOTE — PROGRESS NOTE ADULT - ASSESSMENT
56 yo M with hx of HTN, DM II, Functional paraplegia 2/2 previous lumbar trauma (able to stand but cannot walk), subsequent sacral decubiti and heel ulcers, suprapubic cath, and chronic opiate dependence recent admission in [facility] for paralytic ileus provoked by opiate use, presents to ED for 4d hx of worsening diffuse abdominal pain with diarrhea and decreased appetite likely 2/2 opioid/stress induced ileus with fecal impaction.       #) diffuse abdominal pain and tenderness  2ndary to constipation 2/2 opioid use and ileus  Per GI  - c/w current laxative for  now  - will follow GI recs    #) UTI  - UA +ve for infection , episodes of hypotension  - U/C grew pseudomonas , enterococcus and moregnella  - Pt started on meropenum and linezolid as per ID recs    #) Hyperkalemia  - resolved    #) DM  - Will f/u FS    #) malnutrition  - nutrition consult done today    #) opioid dependance  - methadone    #) sacral and heel wounds  - F/U burns     #) muscle spasm and pain  -valium and xanax    #) vte prophylaxis    Pt changed the status to DNR.

## 2018-08-19 NOTE — PROGRESS NOTE ADULT - SUBJECTIVE AND OBJECTIVE BOX
Subjective and objective    Today is hospital day 3 and pt was complaining abdominal discomfort.        HPI  58 yo M with PMHx DM, B/L LE weakness 2/2 previous lumbar trauma (able to stand but cannot walk), subsequent sacral decubiti and heel ulcers, suprapubic cath, p/w diffuse abdominal pain since last 4 days, and dec appetite since last 10 days. As per patient he was d/ricco from hospital on 8/5 he felt better after that but abd pain started 4 days ago, diffuse, intermittent, initially started as 4-6/10 in intensity, sometimes dull in nature, sometimes stabbing pain, not associated with any food intake, associated with loose stools twice a day for last 4 days and also reported 50 to 60 pounds weight loss since June, and loss of appetite. Denies nay chest pian, SOB, fever, chills, nausea, vomiting, headache, dizziness, cough, dysuria.   Vitals in ED : BP: 108/60, 98F, 98, 20, 98% on room air, Labs showed slight inc WBC count (28210), CT abd and pelvis was done showed large fecal load. received 1 litre bolus in ED. ELISABETH was done by ED didn't get any stools.    Recently admitted in [facility] on 26 July for paralytic ileus sec to opioid over dose and was d/ricco on 8/5.    Medications  	  MEDICATIONS  (STANDING):  ALPRAZolam 2 milliGRAM(s) Oral at bedtime  Dakins Solution - 1/2 Strength 1 Application(s) Topical every 12 hours  diazepam    Tablet 10 milliGRAM(s) Oral two times a day  docusate sodium 100 milliGRAM(s) Oral two times a day  enoxaparin Injectable 40 milliGRAM(s) SubCutaneous daily  gabapentin 800 milliGRAM(s) Oral three times a day  lactulose Syrup 20 Gram(s) Oral at bedtime  lisinopril 20 milliGRAM(s) Oral daily  methadone    Tablet 80 milliGRAM(s) Oral four times a day  oxybutynin 10 milliGRAM(s) Oral daily  pantoprazole    Tablet 40 milliGRAM(s) Oral before breakfast  senna 2 Tablet(s) Oral at bedtime    MEDICATIONS  (PRN):  acetaminophen   Tablet 650 milliGRAM(s) Oral every 6 hours PRN For Temp greater than 38 C (100.4 F)    Vitals    Vital Signs Last 24 Hrs  T(C): 37 (17 Aug 2018 12:22), Max: 37.7 (17 Aug 2018 05:11)  T(F): 98.6 (17 Aug 2018 12:22), Max: 99.9 (17 Aug 2018 05:11)  HR: 96 (17 Aug 2018 12:22) (96 - 106)  BP: 72/43 (17 Aug 2018 12:32) (69/40 - 99/57)  BP(mean): 41 (17 Aug 2018 12:22) (41 - 41)  RR: 18 (17 Aug 2018 12:22) (18 - 18)  SpO2: --    Labs                          8.0    12.85 )-----------( 615      ( 17 Aug 2018 07:17 )             26.8       08-17    140  |  96<L>  |  16  ----------------------------<  54<L>  4.5   |  28  |  0.8    Ca    7.9<L>      17 Aug 2018 07:17      Culture - Urine (collected 15 Aug 2018 09:30)  Source: .Urine Suprapubic  Preliminary Report (16 Aug 2018 21:06):    Numerous Morganella morganii    Numerous Pseudomonas aeruginosa    Moderate Enterococcus faecium    UA +ve for Leukocyte esterase    Physical Exam    GENERAL: chronically ill appearing and weak   HEAD:  Atraumatic, Normocephalic  EYES: EOMI, PERRLA, conjunctiva and sclera clear  NECK: Supple, No JVD  CHEST/LUNG: Clear to auscultation bilaterally; No wheeze  HEART: Regular rate and rhythm; No murmurs, rubs, or gallops  ABDOMEN: Soft, Nontender, Nondistended; Bowel sounds present  EXTREMITIES:  2+ Peripheral Pulses, No clubbing, cyanosis, or edema  PSYCH: AAOx3  NEUROLOGY: non-focal  SKIN: No rashes or lesions
MARGE BELLA [id number]    Hospitalist Note  58yo M with Past Medical History DM, Bilateral lower extremity weakness secondary previous lumbar trauma, subsequent sacral decubitis and heel ulcers, suprapubic cath, admitted with diffuse abdominal pain x4 days secondary to severe constipation/stercocolitis.  Abdominal pain has resolved from admission following multiple bowel movements.    Overnight events/Updates: his blood pressure has recovered over the past 24 hours, though WBC has worsened.  Infectious Disease increased abx coverage to Merrem and Zyvox due to multiple resistant organisms (VRE, Morganella, and Pseudomonas).    Vital Signs Last 24 Hrs  T(C): 36.2 (19 Aug 2018 06:53), Max: 37.1 (18 Aug 2018 12:00)  T(F): 97.1 (19 Aug 2018 06:53), Max: 98.7 (18 Aug 2018 12:00)  HR: 76 (19 Aug 2018 06:53) (76 - 93)  BP: 103/58 (19 Aug 2018 06:53) (71/50 - 120/56)  BP(mean): --  RR: 18 (18 Aug 2018 12:00) (18 - 18)  SpO2: --    Physical Examination:  General: AAO x 3   HEENT: PERRLA, EOMI, temporal wasting  CV= S1 & S2 appreciated  Lungs=CTA BL  Abdominal Examination= + BS, + suprapubic catheter  dressing to the buttocks  Extremity Examination= No C/C/E    ROS: Very talkative throughout interview  All other systems reviewed and are within normal limits except for the complaints in the HPI.      MEDICATIONS  (STANDING):  ALPRAZolam 2 milliGRAM(s) Oral at bedtime  Dakins Solution - 1/2 Strength 1 Application(s) Topical every 12 hours  diazepam    Tablet 10 milliGRAM(s) Oral two times a day  docusate sodium 100 milliGRAM(s) Oral two times a day  enoxaparin Injectable 40 milliGRAM(s) SubCutaneous daily  gabapentin 800 milliGRAM(s) Oral three times a day  lactulose Syrup 20 Gram(s) Oral at bedtime  linezolid  IVPB      linezolid  IVPB 600 milliGRAM(s) IV Intermittent every 12 hours  lisinopril 20 milliGRAM(s) Oral daily  meropenem  IVPB 1000 milliGRAM(s) IV Intermittent every 8 hours  meropenem  IVPB      methadone    Tablet 80 milliGRAM(s) Oral four times a day  oxybutynin 10 milliGRAM(s) Oral daily  pantoprazole    Tablet 40 milliGRAM(s) Oral before breakfast  senna 2 Tablet(s) Oral at bedtime  sodium chloride 0.9%. 1000 milliLiter(s) (50 mL/Hr) IV Continuous <Continuous>    MEDICATIONS  (PRN):  acetaminophen   Tablet 650 milliGRAM(s) Oral every 6 hours PRN For Temp greater than 38 C (100.4 F)  oxyCODONE    IR 30 milliGRAM(s) Oral four times a day PRN Moderate Pain (4 - 6)  polyethylene glycol 3350 17 Gram(s) Oral two times a day PRN Constipation                            8.3    22.19 )-----------( 486      ( 19 Aug 2018 08:12 )             27.0     08-19    133<L>  |  94<L>  |  20  ----------------------------<  57<L>  4.1   |  23  |  1.1    Ca    8.0<L>      19 Aug 2018 08:12  Phos  4.8     08-18  Mg     1.5     08-18        Case discussed with housestaff & family  EL Sood [id number]
MARGE BELLA [id number]    Hospitalist Note  56yo M with Past Medical History DM, Bilateral lower extremity weakness secondary previous lumbar trauma, subsequent sacral decubitis and heel ulcers, suprapubic cath, admitted with diffuse abdominal pain x4 days secondary to severe constipation/stercocolitis.  Abdominal pain has resolved from admission following multiple bowel movements.    Overnight events/Updates: Nursing reported an episode of hypotension and decreased urine output this afternoon.  The patient was talkative and otherwise asymptomatic upon physical examination.    Vital Signs Last 24 Hrs  T(C): 37.1 (18 Aug 2018 12:00), Max: 37.2 (18 Aug 2018 05:36)  T(F): 98.7 (18 Aug 2018 12:00), Max: 99 (18 Aug 2018 05:36)  HR: 93 (18 Aug 2018 12:00) (92 - 97)  BP: 88/58 (18 Aug 2018 12:30) (71/50 - 107/63)  BP(mean): --  RR: 18 (18 Aug 2018 12:00) (18 - 18)  SpO2: --    Physical Examination:  General: AAO x 3   HEENT: PERRLA, EOMI, temporal wasting  CV= S1 & S2 appreciated  Lungs=CTA BL  Abdominal Examination= + BS, + suprapubic catheter  Extremity Examination= No C/C/E    ROS: Very talkative throughout interview  All other systems reviewed and are within normal limits except for the complaints in the HPI.    MEDICATIONS  (STANDING):  ALPRAZolam 2 milliGRAM(s) Oral at bedtime  Dakins Solution - 1/2 Strength 1 Application(s) Topical every 12 hours  diazepam    Tablet 10 milliGRAM(s) Oral two times a day  docusate sodium 100 milliGRAM(s) Oral two times a day  enoxaparin Injectable 40 milliGRAM(s) SubCutaneous daily  gabapentin 800 milliGRAM(s) Oral three times a day  lactulose Syrup 20 Gram(s) Oral at bedtime  lisinopril 20 milliGRAM(s) Oral daily  methadone    Tablet 80 milliGRAM(s) Oral four times a day  oxybutynin 10 milliGRAM(s) Oral daily  pantoprazole    Tablet 40 milliGRAM(s) Oral before breakfast  piperacillin/tazobactam IVPB. 3.375 Gram(s) IV Intermittent every 8 hours  senna 2 Tablet(s) Oral at bedtime  vancomycin  IVPB      vancomycin  IVPB 750 milliGRAM(s) IV Intermittent every 12 hours    MEDICATIONS  (PRN):  acetaminophen   Tablet 650 milliGRAM(s) Oral every 6 hours PRN For Temp greater than 38 C (100.4 F)                            7.3    15.80 )-----------( 481      ( 18 Aug 2018 08:17 )             24.0     08-18    138  |  100  |  25<H>  ----------------------------<  85  3.8   |  27  |  1.3    Ca    7.7<L>      18 Aug 2018 08:17  Phos  4.8     08-18  Mg     1.5     08-18        Case discussed with housestaff & family  LE Sood [id number]
Subjective and objective    Today is hospital day 2 and pt was complaining of abdominal discomfort and requesting more pain medications.        HPI  56 yo M with PMHx DM, B/L LE weakness 2/2 previous lumbar trauma (able to stand but cannot walk), subsequent sacral decubiti and heel ulcers, suprapubic cath, p/w diffuse abdominal pain since last 4 days, and dec appetite since last 10 days. As per patient he was d/ricco from hospital on  he felt better after that but abd pain started 4 days ago, diffuse, intermittent, initially started as 4-6/10 in intensity, sometimes dull in nature, sometimes stabbing pain, not associated with any food intake, associated with loose stools twice a day for last 4 days and also reported 50 to 60 pounds weight loss since , and loss of appetite. Denies nay chest pian, SOB, fever, chills, nausea, vomiting, headache, dizziness, cough, dysuria.   Vitals in ED : BP: 108/60, 98F, 98, 20, 98% on room air, Labs showed slight inc WBC count (77148), CT abd and pelvis was done showed large fecal load. received 1 litre bolus in ED. ELISABETH was done by ED didn't get any stools.    Recently admitted in [facility] on  for paralytic ileus sec to opioid over dose and was d/ricco on .      Medications    MEDICATIONS  (STANDING):  ALPRAZolam 2 milliGRAM(s) Oral at bedtime  diazepam    Tablet 10 milliGRAM(s) Oral two times a day  docusate sodium 100 milliGRAM(s) Oral two times a day  enoxaparin Injectable 40 milliGRAM(s) SubCutaneous daily  gabapentin 800 milliGRAM(s) Oral three times a day  lactulose Syrup 20 Gram(s) Oral at bedtime  lisinopril 20 milliGRAM(s) Oral daily  methadone    Tablet 80 milliGRAM(s) Oral four times a day  oxybutynin 10 milliGRAM(s) Oral daily  pantoprazole    Tablet 40 milliGRAM(s) Oral before breakfast  senna 2 Tablet(s) Oral at bedtime    MEDICATIONS  (PRN):  acetaminophen   Tablet 650 milliGRAM(s) Oral every 6 hours PRN For Temp greater than 38 C (100.4 F)      Labs                          9.0    13.74 )-----------( 662      ( 15 Aug 2018 09:30 )             29.8       -15    138  |  101  |  15  ----------------------------<  107<H>  5.6<H>   |  22  |  0.7    Ca    8.1<L>      15 Aug 2018 09:30    TPro  5.9<L>  /  Alb  2.0<L>  /  TBili  <0.2  /  DBili  <0.2  /  AST  23  /  ALT  11  /  AlkPhos  122<H>  08-15      Urinalysis Basic - ( 15 Aug 2018 09:30 )    Color: Yellow / Appearance: Cloudy / S.025 / pH: x  Gluc: x / Ketone: Negative  / Bili: Negative / Urobili: 0.2 mg/dL   Blood: x / Protein: >=300 mg/dL / Nitrite: Negative   Leuk Esterase: Moderate / RBC: 5-10 /HPF / WBC >50 /HPF   Sq Epi: x / Non Sq Epi: Occasional /HPF / Bacteria: x    CT scan    1. Large colonic fecal load with interval increase in small   abdominopelvic ascites and mesenteric edema. No pneumoperitoneum.    2. Improving bilobar airspace consolidations.    3. Interval resolution of small bowel dilatation.    4. Additional chronic changes as above.      PHYSICAL EXAM:  GENERAL: chronically ill appearing and weak   HEAD:  Atraumatic, Normocephalic  EYES: EOMI, PERRLA, conjunctiva and sclera clear  NECK: Supple, No JVD  CHEST/LUNG: Clear to auscultation bilaterally; No wheeze  HEART: Regular rate and rhythm; No murmurs, rubs, or gallops  ABDOMEN: Soft, Nontender, Nondistended; Bowel sounds present  EXTREMITIES:  2+ Peripheral Pulses, No clubbing, cyanosis, or edema  PSYCH: AAOx3  NEUROLOGY: non-focal  SKIN: No rashes or lesions
Today is hospital day 4 and pt had episodes of hypotension requiring boluses.        HPI  58 yo M with PMHx DM, B/L LE weakness 2/2 previous lumbar trauma (able to stand but cannot walk), subsequent sacral decubiti and heel ulcers, suprapubic cath, p/w diffuse abdominal pain since last 4 days, and dec appetite since last 10 days. As per patient he was d/ricco from hospital on 8/5 he felt better after that but abd pain started 4 days ago, diffuse, intermittent, initially started as 4-6/10 in intensity, sometimes dull in nature, sometimes stabbing pain, not associated with any food intake, associated with loose stools twice a day for last 4 days and also reported 50 to 60 pounds weight loss since June, and loss of appetite. Denies nay chest pian, SOB, fever, chills, nausea, vomiting, headache, dizziness, cough, dysuria.   Vitals in ED : BP: 108/60, 98F, 98, 20, 98% on room air, Labs showed slight inc WBC count (75011), CT abd and pelvis was done showed large fecal load. received 1 litre bolus in ED. ELISABETH was done by ED didn't get any stools.    Recently admitted in [facility] on 26 July for paralytic ileus sec to opioid over dose and was d/ricco on 8/5.      Medications    MEDICATIONS  (STANDING):  ALPRAZolam 2 milliGRAM(s) Oral at bedtime  Dakins Solution - 1/2 Strength 1 Application(s) Topical every 12 hours  diazepam    Tablet 10 milliGRAM(s) Oral two times a day  docusate sodium 100 milliGRAM(s) Oral two times a day  enoxaparin Injectable 40 milliGRAM(s) SubCutaneous daily  gabapentin 800 milliGRAM(s) Oral three times a day  lactulose Syrup 20 Gram(s) Oral at bedtime  linezolid  IVPB      linezolid  IVPB 600 milliGRAM(s) IV Intermittent every 12 hours  lisinopril 20 milliGRAM(s) Oral daily  meropenem  IVPB 1000 milliGRAM(s) IV Intermittent every 8 hours  meropenem  IVPB      methadone    Tablet 80 milliGRAM(s) Oral four times a day  oxybutynin 10 milliGRAM(s) Oral daily  pantoprazole    Tablet 40 milliGRAM(s) Oral before breakfast  senna 2 Tablet(s) Oral at bedtime  sodium chloride 0.9%. 1000 milliLiter(s) (50 mL/Hr) IV Continuous <Continuous>    MEDICATIONS  (PRN):  acetaminophen   Tablet 650 milliGRAM(s) Oral every 6 hours PRN For Temp greater than 38 C (100.4 F)      Vitals    Vital Signs Last 24 Hrs  T(C): 36.2 (18 Aug 2018 21:15), Max: 37.2 (18 Aug 2018 05:36)  T(F): 97.1 (18 Aug 2018 21:15), Max: 99 (18 Aug 2018 05:36)  HR: 89 (18 Aug 2018 21:15) (80 - 93)  BP: 120/56 (18 Aug 2018 21:15) (71/50 - 120/56)  BP(mean): --  RR: 18 (18 Aug 2018 12:00) (18 - 18)  SpO2: --    Labs                          7.3    15.80 )-----------( 481      ( 18 Aug 2018 08:17 )             24.0       08-18    138  |  100  |  25<H>  ----------------------------<  85  3.8   |  27  |  1.3    Ca    7.7<L>      18 Aug 2018 08:17  Phos  4.8     08-18  Mg     1.5     08-18        Physical Exam    PHYSICAL EXAM:  GENERAL: NAD, well-developed  HEAD:  Atraumatic, Normocephalic  EYES: EOMI, PERRLA, conjunctiva and sclera clear  NECK: Supple, No JVD  CHEST/LUNG: Clear to auscultation bilaterally  HEART: S1 +S2  ABDOMEN:  Distended abdomen , hypoactive bowel sounds  Suprapubic catherter

## 2018-08-20 ENCOUNTER — TRANSCRIPTION ENCOUNTER (OUTPATIENT)
Age: 58
End: 2018-08-20

## 2018-08-20 VITALS — DIASTOLIC BLOOD PRESSURE: 57 MMHG | SYSTOLIC BLOOD PRESSURE: 103 MMHG | RESPIRATION RATE: 20 BRPM | HEART RATE: 89 BPM

## 2018-08-20 DIAGNOSIS — F43.21 ADJUSTMENT DISORDER WITH DEPRESSED MOOD: ICD-10-CM

## 2018-08-20 PROBLEM — L89.892 PRESSURE ULCER OF TOE OF LEFT FOOT, STAGE 2: Status: ACTIVE | Noted: 2017-10-10

## 2018-08-20 RX ORDER — SODIUM HYPOCHLORITE 0.125 %
1 SOLUTION, NON-ORAL MISCELLANEOUS
Qty: 5 | Refills: 0
Start: 2018-08-20 | End: 2018-09-18

## 2018-08-20 RX ORDER — DEXTROSE 50 % IN WATER 50 %
4 SYRINGE (ML) INTRAVENOUS
Qty: 10 | Refills: 0
Start: 2018-08-20 | End: 2018-09-18

## 2018-08-20 RX ORDER — OXYBUTYNIN CHLORIDE 5 MG
1 TABLET ORAL
Qty: 30 | Refills: 0
Start: 2018-08-20 | End: 2018-09-18

## 2018-08-20 RX ORDER — LINEZOLID 600 MG/300ML
1 INJECTION, SOLUTION INTRAVENOUS
Qty: 24 | Refills: 0 | OUTPATIENT
Start: 2018-08-20 | End: 2018-08-31

## 2018-08-20 RX ORDER — CIPROFLOXACIN LACTATE 400MG/40ML
1 VIAL (ML) INTRAVENOUS
Qty: 7 | Refills: 0
Start: 2018-08-20 | End: 2018-08-26

## 2018-08-20 RX ORDER — PANTOPRAZOLE SODIUM 20 MG/1
1 TABLET, DELAYED RELEASE ORAL
Qty: 0 | Refills: 0 | DISCHARGE
Start: 2018-08-20

## 2018-08-20 RX ORDER — POLYETHYLENE GLYCOL 3350 17 G/17G
17 POWDER, FOR SOLUTION ORAL
Qty: 500 | Refills: 0
Start: 2018-08-20 | End: 2018-09-18

## 2018-08-20 RX ORDER — LINEZOLID 600 MG/300ML
1 INJECTION, SOLUTION INTRAVENOUS
Qty: 14 | Refills: 0
Start: 2018-08-20 | End: 2018-08-26

## 2018-08-20 RX ORDER — LACTULOSE 10 G/15ML
30 SOLUTION ORAL
Qty: 900 | Refills: 0
Start: 2018-08-20 | End: 2018-09-18

## 2018-08-20 RX ORDER — SENNA PLUS 8.6 MG/1
2 TABLET ORAL
Qty: 60 | Refills: 0
Start: 2018-08-20 | End: 2018-09-18

## 2018-08-20 RX ORDER — LINEZOLID 600 MG/300ML
600 INJECTION, SOLUTION INTRAVENOUS EVERY 12 HOURS
Qty: 0 | Refills: 0 | Status: DISCONTINUED | OUTPATIENT
Start: 2018-08-20 | End: 2018-08-20

## 2018-08-20 RX ORDER — CIPROFLOXACIN LACTATE 400MG/40ML
1 VIAL (ML) INTRAVENOUS
Qty: 12 | Refills: 0 | OUTPATIENT
Start: 2018-08-20 | End: 2018-08-31

## 2018-08-20 RX ORDER — DOCUSATE SODIUM 100 MG
1 CAPSULE ORAL
Qty: 60 | Refills: 0
Start: 2018-08-20 | End: 2018-09-18

## 2018-08-20 RX ORDER — OXYBUTYNIN CHLORIDE 5 MG
1 TABLET ORAL
Qty: 0 | Refills: 0 | COMMUNITY

## 2018-08-20 RX ADMIN — Medication 1 APPLICATION(S): at 17:38

## 2018-08-20 RX ADMIN — MEROPENEM 100 MILLIGRAM(S): 1 INJECTION INTRAVENOUS at 00:50

## 2018-08-20 RX ADMIN — Medication 10 MILLIGRAM(S): at 19:40

## 2018-08-20 RX ADMIN — Medication 100 MILLIGRAM(S): at 19:36

## 2018-08-20 RX ADMIN — METHADONE HYDROCHLORIDE 80 MILLIGRAM(S): 40 TABLET ORAL at 07:06

## 2018-08-20 RX ADMIN — OXYCODONE HYDROCHLORIDE 30 MILLIGRAM(S): 5 TABLET ORAL at 20:00

## 2018-08-20 RX ADMIN — ENOXAPARIN SODIUM 40 MILLIGRAM(S): 100 INJECTION SUBCUTANEOUS at 12:41

## 2018-08-20 RX ADMIN — LISINOPRIL 20 MILLIGRAM(S): 2.5 TABLET ORAL at 05:50

## 2018-08-20 RX ADMIN — MEROPENEM 100 MILLIGRAM(S): 1 INJECTION INTRAVENOUS at 07:06

## 2018-08-20 RX ADMIN — METHADONE HYDROCHLORIDE 80 MILLIGRAM(S): 40 TABLET ORAL at 12:38

## 2018-08-20 RX ADMIN — GABAPENTIN 800 MILLIGRAM(S): 400 CAPSULE ORAL at 05:50

## 2018-08-20 RX ADMIN — OXYCODONE HYDROCHLORIDE 30 MILLIGRAM(S): 5 TABLET ORAL at 12:39

## 2018-08-20 RX ADMIN — LINEZOLID 600 MILLIGRAM(S): 600 INJECTION, SOLUTION INTRAVENOUS at 19:36

## 2018-08-20 RX ADMIN — GABAPENTIN 800 MILLIGRAM(S): 400 CAPSULE ORAL at 14:33

## 2018-08-20 RX ADMIN — METHADONE HYDROCHLORIDE 80 MILLIGRAM(S): 40 TABLET ORAL at 00:26

## 2018-08-20 RX ADMIN — Medication 10 MILLIGRAM(S): at 19:45

## 2018-08-20 RX ADMIN — Medication 10 MILLIGRAM(S): at 20:04

## 2018-08-20 RX ADMIN — LINEZOLID 300 MILLIGRAM(S): 600 INJECTION, SOLUTION INTRAVENOUS at 05:47

## 2018-08-20 RX ADMIN — METHADONE HYDROCHLORIDE 80 MILLIGRAM(S): 40 TABLET ORAL at 19:48

## 2018-08-20 NOTE — BEHAVIORAL HEALTH ASSESSMENT NOTE - SUMMARY
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 Psychiatry was consulted to evaluate patient for depression vs. bereavement 2/2 recent loss of patient's father.    Upon assessment, patient is not acutely depressed, suicidal, psychotic, or manic. His lack of appetite for the past few weeks has been secondary to the recent loss of patient's father but patient is reporting improvement in his appetite coupled with future orientation and optimism. Patient is not an imminent danger to himself or others and does not require inpatient psychiatric hospitalization. 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 Psychiatry was consulted to evaluate patient for depression vs. bereavement 2/2 recent loss of patient's father.    Upon assessment, patient is not acutely depressed, suicidal, psychotic, or manic. His lack of appetite for the past few weeks has been secondary to the recent loss of patient's father but patient is reporting improvement in his appetite coupled with future orientation and optimism. Patient is not an imminent danger to himself or others and does not require inpatient psychiatric hospitalization. Patient's chronic pain medication regimen is managed by his primary care doctor, Dr. Chemo Van. Recommend no acute psychiatric intervention at this time. If patient is interested in outpatient psychotherapy, can refer patient to [facility] at [address] [place] [address]; [phone].

## 2018-08-20 NOTE — BEHAVIORAL HEALTH ASSESSMENT NOTE - HPI (INCLUDE ILLNESS QUALITY, SEVERITY, DURATION, TIMING, CONTEXT, MODIFYING FACTORS, ASSOCIATED SIGNS AND SYMPTOMS)
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 Psychiatry was consulted to evaluate patient for depression vs. bereavement 2/2 recent loss of patient's father.     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 Psychiatry was consulted to evaluate patient for depression vs. bereavement 2/2 recent loss of patient's father.     Upon approach, patient was pleasant and cooperative. He immediately states "I'm not depressed; I don't want to kill myself". He further states that his father  two and a half months ago and "it's been really hard. He was my best friend". He states that he wants to "get better and get out of here so I can be home with my mom and go back to my cars". 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 He states that everyday he tries to help at least one person in any way he can. He denies any symptoms suggestive of depression. He states he had poor appetite after losing his father but states that he "ate 5 pancakes this morning" and he further reports that he wants to gain the weight he lost, back. He denies any thoughts of wanting to hurt himself or others. He denies any symptoms suggestive of psychosis or adrienne. He denies any past suicide attempts. He denies any current substance use.

## 2018-08-20 NOTE — BEHAVIORAL HEALTH ASSESSMENT NOTE - RISK ASSESSMENT
low to moderate risk- Although patient has a history of multiple medical issues along with chronic pain, he is not depressed or suicidal, he is future oriented (wants to fix cars, take care of his mother), identifies reasons for living (his community work, his mother, [employer] work), has strong family support (his mother), and has access and willingness to receive care. Patient is not an imminent danger to himself or others and does not require inpatient psychiatric hospitalization.

## 2018-08-20 NOTE — BEHAVIORAL HEALTH ASSESSMENT NOTE - DESCRIPTION
DM, B/L LE weakness 2/2 previous lumbar trauma (able to stand but cannot walk), subsequent sacral decubiti and heel ulcers, suprapubic cath,

## 2018-08-20 NOTE — DISCHARGE NOTE ADULT - NSTOBACCOHOTLINE_GEN_A_NCS
[facility] Smokers Quitline ([phone]) [facility] Smokers Quitline ([phone])

## 2018-08-20 NOTE — BEHAVIORAL HEALTH ASSESSMENT NOTE - AXIS III
DM, B/L LE weakness 2/2 previous lumbar trauma, subsequent sacral decubiti and heel ulcers, suprapubic cath

## 2018-08-20 NOTE — DISCHARGE NOTE ADULT - PLAN OF CARE
complete resolution of symptoms continue taking senna and colace regularly. Eat vegetables and fruits and use lactulose as needed for contipation. maintain optimal blood sugar levels continue taking medications and follow up with your primary doctor resolution of symptoms follow up on outpatient basis with the psychotherapist if still feeling symptoms after a month. maintain optimal blood pressure levels continue taking medications and follow up with your primary doctor. Wash your wounds twice daily with soap and water, after that apply the dakins dressing with moist keflix and cover with ABD tape. prevention of complications continue taking care of the catheter and change it after 4 weeks. continue taking senna and colace regularly. Eat vegetables and fruits and use lactulose as needed for constipation.

## 2018-08-20 NOTE — DISCHARGE NOTE ADULT - PROVIDER TOKENS
FREE:[LAST:[Rehan],FIRST:[Chemo],PHONE:[(   )    -],FAX:[(   )    -],ADDRESS:[[address] [place] [phone]]]

## 2018-08-20 NOTE — DISCHARGE NOTE ADULT - CARE PLAN
Principal Discharge DX:	Bowel obstruction  Goal:	complete resolution of symptoms  Assessment and plan of treatment:	continue taking senna and colace regularly. Eat vegetables and fruits and use lactulose as needed for contipation.  Secondary Diagnosis:	Diabetes  Goal:	maintain optimal blood sugar levels  Assessment and plan of treatment:	continue taking medications and follow up with your primary doctor  Secondary Diagnosis:	Adjustment disorder with depressed mood in remission  Goal:	resolution of symptoms  Assessment and plan of treatment:	follow up on outpatient basis with the psychotherapist if still feeling symptoms after a month.  Secondary Diagnosis:	Hypertension  Goal:	maintain optimal blood pressure levels  Assessment and plan of treatment:	continue taking medications and follow up with your primary doctor.  Secondary Diagnosis:	Pressure ulcer  Goal:	complete resolution of symptoms  Assessment and plan of treatment:	Wash your wounds twice daily with soap and water, after that apply the dakins dressing with moist keflix and cover with ABD tape.  Secondary Diagnosis:	Suprapubic catheter  Goal:	prevention of complications  Assessment and plan of treatment:	continue taking care of the catheter and change it after 4 weeks.  Secondary Diagnosis:	Lumbar compression fracture  Goal:	prevention of complications  Assessment and plan of treatment:	continue taking medications and follow up with your primary doctor. Principal Discharge DX:	Bowel obstruction  Goal:	complete resolution of symptoms  Assessment and plan of treatment:	continue taking senna and colace regularly. Eat vegetables and fruits and use lactulose as needed for constipation.  Secondary Diagnosis:	Diabetes  Goal:	maintain optimal blood sugar levels  Assessment and plan of treatment:	continue taking medications and follow up with your primary doctor  Secondary Diagnosis:	Adjustment disorder with depressed mood in remission  Goal:	resolution of symptoms  Assessment and plan of treatment:	follow up on outpatient basis with the psychotherapist if still feeling symptoms after a month.  Secondary Diagnosis:	Hypertension  Goal:	maintain optimal blood pressure levels  Assessment and plan of treatment:	continue taking medications and follow up with your primary doctor.  Secondary Diagnosis:	Pressure ulcer  Goal:	complete resolution of symptoms  Assessment and plan of treatment:	Wash your wounds twice daily with soap and water, after that apply the dakins dressing with moist keflix and cover with ABD tape.  Secondary Diagnosis:	Suprapubic catheter  Goal:	prevention of complications  Assessment and plan of treatment:	continue taking care of the catheter and change it after 4 weeks.  Secondary Diagnosis:	Lumbar compression fracture  Goal:	prevention of complications  Assessment and plan of treatment:	continue taking medications and follow up with your primary doctor.

## 2018-08-20 NOTE — DISCHARGE NOTE ADULT - CARE PROVIDER_API CALL
Chemo Van  [address] [place] [address] #[place] [address]  Phone: (   )    -  Fax: (   )    -

## 2018-08-20 NOTE — BEHAVIORAL HEALTH ASSESSMENT NOTE - NSBHCONSULTFOLLOWAFTERCARE_PSY_A_CORE FT
If patient is interested in outpatient psychotherapy, can refer patient to [facility] at [address] [place] [address]; [phone].
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
## 2018-08-20 NOTE — BEHAVIORAL HEALTH ASSESSMENT NOTE - DESCRIPTION (FIRST USE, LAST USE, QUANTITY, FREQUENCY, DURATION)
opiates only as prescribed by his doctor; denies history of heroin use smokes 1/2 ppd for 15-20 years former drinker; quit 2-3 years ago smokes cannabis once a week opiates only as prescribed for pain by his doctor; denies history of heroin use or any IV drug use

## 2018-08-20 NOTE — DISCHARGE NOTE ADULT - MEDICATION SUMMARY - MEDICATIONS TO STOP TAKING
I will STOP taking the medications listed below when I get home from the hospital:    amoxicillin-clavulanate 500 mg-125 mg oral tablet  -- 1 tab(s) by mouth 2 times a day
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
## 2018-08-20 NOTE — BEHAVIORAL HEALTH ASSESSMENT NOTE - NSBHMEDSOTHERFT_PSY_A_CORE
oxycodone 30 mg oral tablet: 1 tab(s) orally 4 times a day, methadone: 80 milligram(s) orally 4 times a day, Valium 10 mg oral tablet: orally 2 times a day, Xanax 2 mg oral tablet: orally once (at bedtime), gabapentin 800 mg oral tablet: 1 tab(s) orally 3 times a day, lisinopril, oxybutynin. All medications prescribed by patient's primary doctor, Dr. Chemo Van

## 2018-08-20 NOTE — DISCHARGE NOTE ADULT - SECONDARY DIAGNOSIS.
Diabetes Adjustment disorder with depressed mood in remission Hypertension Pressure ulcer Suprapubic catheter Lumbar compression fracture

## 2018-08-20 NOTE — DISCHARGE NOTE ADULT - MEDICATION SUMMARY - MEDICATIONS TO TAKE
I will START or STAY ON the medications listed below when I get home from the hospital:    methadone  -- 80 milligram(s) by mouth 4 times a day  -- Indication: For Chronic pain and opioid use    oxyCODONE 30 mg oral tablet  -- 1 tab(s) by mouth 4 times a day  -- Indication: For Chronic pain and opioid use    lisinopril 20 mg oral tablet  -- 1 tab(s) by mouth once a day  -- Indication: For Hypertension    gabapentin 800 mg oral tablet  -- 1 tab(s) by mouth 3 times a day  -- Indication: For chronic neuropathic pain    Valium 10 mg oral tablet  -- orally 2 times a day  -- Indication: For chronic opioid dependence    sodium hypochlorite 0.25% topical solution  -- 1 application on skin every 12 hours  -- Indication: For sacral ulcers    Xanax 2 mg oral tablet  -- orally once (at bedtime)  -- Indication: For insomnia    glucose 4 g oral tablet, chewable  -- 4 tab(s) by mouth once as needed  -- Indication: For For hypoglycemia    docusate sodium 100 mg oral capsule  -- 1 cap(s) by mouth 2 times a day  -- Indication: For constipation    lactulose 10 g/15 mL oral syrup  -- 30 milliliter(s) by mouth once a day (at bedtime)  -- Indication: For constipation    polyethylene glycol 3350 oral powder for reconstitution  -- 17 gram(s) by mouth 2 times a day, As needed, Constipation  -- Indication: For constipation    senna oral tablet  -- 2 tab(s) by mouth once a day (at bedtime)  -- Indication: For constipation    Zyvox 600 mg oral tablet  -- 1 tab(s) by mouth 2 times a day   -- Avoid chocolate, wine and cheese while taking this medication.  Finish all this medication unless otherwise directed by prescriber.  Obtain medical advice before taking any non-prescription drugs as some may affect the action of this medication.    -- Indication: For urinary tract infection    pantoprazole 40 mg oral delayed release tablet  -- 1 tab(s) by mouth once a day (before a meal)  -- Indication: For gerd    Levaquin 500 mg oral tablet  -- 1 tab(s) by mouth once a day   -- Avoid prolonged or excessive exposure to direct and/or artificial sunlight while taking this medication.  Do not take dairy products, antacids, or iron preparations within one hour of this medication.  Finish all this medication unless otherwise directed by prescriber.  May cause drowsiness or dizziness.  Medication should be taken with plenty of water.    -- Indication: For urinary tract infection    Testim 1% (50 mg/5 g) transdermal gel  -- 1 packet(s) by transdermal patch once a day (in the morning)  -- Indication: For testosterone replacement    oxybutynin 10 mg/24 hr oral tablet, extended release  -- 1 tab(s) by mouth once a day  -- Indication: For urinary incontinence

## 2018-08-23 LAB
CULTURE RESULTS: SIGNIFICANT CHANGE UP
SPECIMEN SOURCE: SIGNIFICANT CHANGE UP

## 2018-08-29 DIAGNOSIS — E46 UNSPECIFIED PROTEIN-CALORIE MALNUTRITION: ICD-10-CM

## 2018-08-29 DIAGNOSIS — F43.21 ADJUSTMENT DISORDER WITH DEPRESSED MOOD: ICD-10-CM

## 2018-08-29 DIAGNOSIS — R10.9 UNSPECIFIED ABDOMINAL PAIN: ICD-10-CM

## 2018-08-29 DIAGNOSIS — F44.4 CONVERSION DISORDER WITH MOTOR SYMPTOM OR DEFICIT: ICD-10-CM

## 2018-08-29 DIAGNOSIS — M48.56XS COLLAPSED VERTEBRA, NOT ELSEWHERE CLASSIFIED, LUMBAR REGION, SEQUELA OF FRACTURE: ICD-10-CM

## 2018-08-29 DIAGNOSIS — T40.2X5A ADVERSE EFFECT OF OTHER OPIOIDS, INITIAL ENCOUNTER: ICD-10-CM

## 2018-08-29 DIAGNOSIS — L89.609 PRESSURE ULCER OF UNSPECIFIED HEEL, UNSPECIFIED STAGE: ICD-10-CM

## 2018-08-29 DIAGNOSIS — K59.03 DRUG INDUCED CONSTIPATION: ICD-10-CM

## 2018-08-29 DIAGNOSIS — K52.89 OTHER SPECIFIED NONINFECTIVE GASTROENTERITIS AND COLITIS: ICD-10-CM

## 2018-08-29 DIAGNOSIS — F11.29 OPIOID DEPENDENCE WITH UNSPECIFIED OPIOID-INDUCED DISORDER: ICD-10-CM

## 2018-08-29 DIAGNOSIS — N39.0 URINARY TRACT INFECTION, SITE NOT SPECIFIED: ICD-10-CM

## 2018-08-29 DIAGNOSIS — Z96.0 PRESENCE OF UROGENITAL IMPLANTS: ICD-10-CM

## 2018-08-29 DIAGNOSIS — E11.9 TYPE 2 DIABETES MELLITUS WITHOUT COMPLICATIONS: ICD-10-CM

## 2018-08-29 DIAGNOSIS — I10 ESSENTIAL (PRIMARY) HYPERTENSION: ICD-10-CM

## 2018-08-29 DIAGNOSIS — E87.5 HYPERKALEMIA: ICD-10-CM

## 2018-08-29 DIAGNOSIS — L89.154 PRESSURE ULCER OF SACRAL REGION, STAGE 4: ICD-10-CM

## 2018-08-29 DIAGNOSIS — K56.609 UNSPECIFIED INTESTINAL OBSTRUCTION, UNSPECIFIED AS TO PARTIAL VERSUS COMPLETE OBSTRUCTION: ICD-10-CM

## 2018-10-26 PROBLEM — I10 ESSENTIAL (PRIMARY) HYPERTENSION: Chronic | Status: ACTIVE | Noted: 2018-08-15

## 2018-11-08 ENCOUNTER — OUTPATIENT (OUTPATIENT)
Dept: OUTPATIENT SERVICES | Facility: HOSPITAL | Age: 58
LOS: 1 days | Discharge: HOME | End: 2018-11-08

## 2018-11-08 ENCOUNTER — APPOINTMENT (OUTPATIENT)
Dept: BURN CARE | Facility: CLINIC | Age: 58
End: 2018-11-08

## 2018-11-08 DIAGNOSIS — Z98.890 OTHER SPECIFIED POSTPROCEDURAL STATES: Chronic | ICD-10-CM

## 2018-11-08 DIAGNOSIS — L89.94 PRESSURE ULCER OF UNSPECIFIED SITE, STAGE 4: ICD-10-CM

## 2018-11-08 DIAGNOSIS — L89.892 PRESSURE ULCER OF OTHER SITE, STAGE 2: ICD-10-CM

## 2018-11-08 DIAGNOSIS — Z89.421 ACQUIRED ABSENCE OF OTHER RIGHT TOE(S): Chronic | ICD-10-CM

## 2018-11-15 DIAGNOSIS — L89.624 PRESSURE ULCER OF LEFT HEEL, STAGE 4: ICD-10-CM

## 2018-11-15 DIAGNOSIS — L89.154 PRESSURE ULCER OF SACRAL REGION, STAGE 4: ICD-10-CM

## 2018-11-15 DIAGNOSIS — L89.612 PRESSURE ULCER OF RIGHT HEEL, STAGE 2: ICD-10-CM

## 2019-01-01 ENCOUNTER — OUTPATIENT (OUTPATIENT)
Dept: OUTPATIENT SERVICES | Facility: HOSPITAL | Age: 59
LOS: 1 days | Discharge: HOME | End: 2019-01-01

## 2019-01-01 ENCOUNTER — RESULT REVIEW (OUTPATIENT)
Age: 59
End: 2019-01-01

## 2019-01-01 ENCOUNTER — INPATIENT (INPATIENT)
Facility: HOSPITAL | Age: 59
LOS: 3 days | Discharge: HOME HEALTH | End: 2019-09-13
Attending: INTERNAL MEDICINE | Admitting: INTERNAL MEDICINE
Payer: MEDICARE

## 2019-01-01 ENCOUNTER — TRANSCRIPTION ENCOUNTER (OUTPATIENT)
Age: 59
End: 2019-01-01

## 2019-01-01 ENCOUNTER — MEDICATION RENEWAL (OUTPATIENT)
Age: 59
End: 2019-01-01

## 2019-01-01 ENCOUNTER — APPOINTMENT (OUTPATIENT)
Dept: BURN CARE | Facility: CLINIC | Age: 59
End: 2019-01-01

## 2019-01-01 ENCOUNTER — APPOINTMENT (OUTPATIENT)
Dept: VASCULAR SURGERY | Facility: CLINIC | Age: 59
End: 2019-01-01

## 2019-01-01 ENCOUNTER — INPATIENT (INPATIENT)
Facility: HOSPITAL | Age: 59
LOS: 9 days | Discharge: HOME HEALTH | End: 2019-10-21
Attending: HOSPITALIST | Admitting: HOSPITALIST
Payer: MEDICARE

## 2019-01-01 ENCOUNTER — APPOINTMENT (OUTPATIENT)
Dept: BURN CARE | Facility: CLINIC | Age: 59
End: 2019-01-01
Payer: MEDICARE

## 2019-01-01 ENCOUNTER — INPATIENT (INPATIENT)
Facility: HOSPITAL | Age: 59
LOS: 17 days | Discharge: HOME HEALTH | End: 2019-06-21
Attending: SURGERY | Admitting: SURGERY
Payer: MEDICARE

## 2019-01-01 ENCOUNTER — RX RENEWAL (OUTPATIENT)
Age: 59
End: 2019-01-01

## 2019-01-01 ENCOUNTER — INPATIENT (INPATIENT)
Facility: HOSPITAL | Age: 59
LOS: 41 days | Discharge: NURSING, REHAB OR LONG-TERM CARE | End: 2020-01-07
Attending: INTERNAL MEDICINE | Admitting: INTERNAL MEDICINE
Payer: MEDICARE

## 2019-01-01 ENCOUNTER — INPATIENT (INPATIENT)
Facility: HOSPITAL | Age: 59
LOS: 19 days | Discharge: HOME HEALTH | End: 2019-07-16
Attending: INTERNAL MEDICINE | Admitting: INTERNAL MEDICINE
Payer: MEDICARE

## 2019-01-01 ENCOUNTER — OUTPATIENT (OUTPATIENT)
Dept: OUTPATIENT SERVICES | Facility: HOSPITAL | Age: 59
LOS: 1 days | End: 2019-01-01
Payer: MEDICARE

## 2019-01-01 VITALS
RESPIRATION RATE: 20 BRPM | HEART RATE: 87 BPM | DIASTOLIC BLOOD PRESSURE: 61 MMHG | SYSTOLIC BLOOD PRESSURE: 103 MMHG | OXYGEN SATURATION: 97 % | TEMPERATURE: 98 F

## 2019-01-01 VITALS
DIASTOLIC BLOOD PRESSURE: 59 MMHG | OXYGEN SATURATION: 99 % | HEART RATE: 74 BPM | RESPIRATION RATE: 25 BRPM | SYSTOLIC BLOOD PRESSURE: 115 MMHG

## 2019-01-01 VITALS
DIASTOLIC BLOOD PRESSURE: 66 MMHG | SYSTOLIC BLOOD PRESSURE: 131 MMHG | RESPIRATION RATE: 16 BRPM | TEMPERATURE: 97 F | HEART RATE: 70 BPM

## 2019-01-01 VITALS
HEART RATE: 103 BPM | OXYGEN SATURATION: 99 % | WEIGHT: 160.06 LBS | RESPIRATION RATE: 18 BRPM | SYSTOLIC BLOOD PRESSURE: 198 MMHG | TEMPERATURE: 96 F | DIASTOLIC BLOOD PRESSURE: 150 MMHG | HEIGHT: 73 IN

## 2019-01-01 VITALS — TEMPERATURE: 98 F

## 2019-01-01 VITALS
TEMPERATURE: 96 F | HEART RATE: 89 BPM | DIASTOLIC BLOOD PRESSURE: 66 MMHG | RESPIRATION RATE: 16 BRPM | SYSTOLIC BLOOD PRESSURE: 135 MMHG

## 2019-01-01 VITALS
SYSTOLIC BLOOD PRESSURE: 87 MMHG | HEART RATE: 88 BPM | RESPIRATION RATE: 16 BRPM | WEIGHT: 145.06 LBS | TEMPERATURE: 99 F | OXYGEN SATURATION: 94 % | DIASTOLIC BLOOD PRESSURE: 55 MMHG

## 2019-01-01 VITALS
DIASTOLIC BLOOD PRESSURE: 50 MMHG | TEMPERATURE: 99 F | HEIGHT: 72 IN | HEART RATE: 89 BPM | RESPIRATION RATE: 19 BRPM | WEIGHT: 160.06 LBS | SYSTOLIC BLOOD PRESSURE: 101 MMHG | OXYGEN SATURATION: 97 %

## 2019-01-01 VITALS
TEMPERATURE: 97 F | HEART RATE: 85 BPM | DIASTOLIC BLOOD PRESSURE: 60 MMHG | SYSTOLIC BLOOD PRESSURE: 100 MMHG | RESPIRATION RATE: 18 BRPM

## 2019-01-01 DIAGNOSIS — T83.510S INFECTION AND INFLAMMATORY REACTION DUE TO CYSTOSTOMY CATHETER, SEQUELA: ICD-10-CM

## 2019-01-01 DIAGNOSIS — Z89.421 ACQUIRED ABSENCE OF OTHER RIGHT TOE(S): Chronic | ICD-10-CM

## 2019-01-01 DIAGNOSIS — L89.313 PRESSURE ULCER OF RIGHT BUTTOCK, STAGE 3: ICD-10-CM

## 2019-01-01 DIAGNOSIS — B96.89 OTHER SPECIFIED BACTERIAL AGENTS AS THE CAUSE OF DISEASES CLASSIFIED ELSEWHERE: ICD-10-CM

## 2019-01-01 DIAGNOSIS — E87.1 HYPO-OSMOLALITY AND HYPONATREMIA: ICD-10-CM

## 2019-01-01 DIAGNOSIS — E87.2 ACIDOSIS: ICD-10-CM

## 2019-01-01 DIAGNOSIS — E46 UNSPECIFIED PROTEIN-CALORIE MALNUTRITION: ICD-10-CM

## 2019-01-01 DIAGNOSIS — I10 ESSENTIAL (PRIMARY) HYPERTENSION: ICD-10-CM

## 2019-01-01 DIAGNOSIS — Z98.890 OTHER SPECIFIED POSTPROCEDURAL STATES: Chronic | ICD-10-CM

## 2019-01-01 DIAGNOSIS — L89.314 PRESSURE ULCER OF RIGHT BUTTOCK, STAGE 4: ICD-10-CM

## 2019-01-01 DIAGNOSIS — Z93.51 CUTANEOUS-VESICOSTOMY STATUS: ICD-10-CM

## 2019-01-01 DIAGNOSIS — L89.94 PRESSURE ULCER OF UNSPECIFIED SITE, STAGE 4: ICD-10-CM

## 2019-01-01 DIAGNOSIS — N18.4 CHRONIC KIDNEY DISEASE, STAGE 4 (SEVERE): ICD-10-CM

## 2019-01-01 DIAGNOSIS — E83.42 HYPOMAGNESEMIA: ICD-10-CM

## 2019-01-01 DIAGNOSIS — G82.20 PARAPLEGIA, UNSPECIFIED: ICD-10-CM

## 2019-01-01 DIAGNOSIS — J96.02 ACUTE RESPIRATORY FAILURE WITH HYPERCAPNIA: ICD-10-CM

## 2019-01-01 DIAGNOSIS — L89.154 PRESSURE ULCER OF SACRAL REGION, STAGE 4: ICD-10-CM

## 2019-01-01 DIAGNOSIS — Z87.898 PERSONAL HISTORY OF OTHER SPECIFIED CONDITIONS: ICD-10-CM

## 2019-01-01 DIAGNOSIS — G89.29 OTHER CHRONIC PAIN: ICD-10-CM

## 2019-01-01 DIAGNOSIS — I12.9 HYPERTENSIVE CHRONIC KIDNEY DISEASE WITH STAGE 1 THROUGH STAGE 4 CHRONIC KIDNEY DISEASE, OR UNSPECIFIED CHRONIC KIDNEY DISEASE: ICD-10-CM

## 2019-01-01 DIAGNOSIS — G92 TOXIC ENCEPHALOPATHY: ICD-10-CM

## 2019-01-01 DIAGNOSIS — T42.4X1A POISONING BY BENZODIAZEPINES, ACCIDENTAL (UNINTENTIONAL), INITIAL ENCOUNTER: ICD-10-CM

## 2019-01-01 DIAGNOSIS — J98.8 OTHER SPECIFIED RESPIRATORY DISORDERS: ICD-10-CM

## 2019-01-01 DIAGNOSIS — N39.0 URINARY TRACT INFECTION, SITE NOT SPECIFIED: ICD-10-CM

## 2019-01-01 DIAGNOSIS — E83.39 OTHER DISORDERS OF PHOSPHORUS METABOLISM: ICD-10-CM

## 2019-01-01 DIAGNOSIS — E11.51 TYPE 2 DIABETES MELLITUS WITH DIABETIC PERIPHERAL ANGIOPATHY WITHOUT GANGRENE: ICD-10-CM

## 2019-01-01 DIAGNOSIS — L89.522 PRESSURE ULCER OF LEFT ANKLE, STAGE 2: ICD-10-CM

## 2019-01-01 DIAGNOSIS — D64.9 ANEMIA, UNSPECIFIED: ICD-10-CM

## 2019-01-01 DIAGNOSIS — R68.0 HYPOTHERMIA, NOT ASSOCIATED WITH LOW ENVIRONMENTAL TEMPERATURE: ICD-10-CM

## 2019-01-01 DIAGNOSIS — N13.9 OBSTRUCTIVE AND REFLUX UROPATHY, UNSPECIFIED: ICD-10-CM

## 2019-01-01 DIAGNOSIS — R79.89 OTHER SPECIFIED ABNORMAL FINDINGS OF BLOOD CHEMISTRY: ICD-10-CM

## 2019-01-01 DIAGNOSIS — L98.499 UNSPECIFIED ATHEROSCLEROSIS OF NATIVE ARTERIES OF EXTREMITIES, UNSPECIFIED EXTREMITY: ICD-10-CM

## 2019-01-01 DIAGNOSIS — I95.9 HYPOTENSION, UNSPECIFIED: ICD-10-CM

## 2019-01-01 DIAGNOSIS — E22.1 HYPERPROLACTINEMIA: ICD-10-CM

## 2019-01-01 DIAGNOSIS — Z89.511 ACQUIRED ABSENCE OF RIGHT LEG BELOW KNEE: Chronic | ICD-10-CM

## 2019-01-01 DIAGNOSIS — E27.40 UNSPECIFIED ADRENOCORTICAL INSUFFICIENCY: ICD-10-CM

## 2019-01-01 DIAGNOSIS — R30.0 DYSURIA: ICD-10-CM

## 2019-01-01 DIAGNOSIS — E11.69 TYPE 2 DIABETES MELLITUS WITH OTHER SPECIFIED COMPLICATION: ICD-10-CM

## 2019-01-01 DIAGNOSIS — T83.510A INFECTION AND INFLAMMATORY REACTION DUE TO CYSTOSTOMY CATHETER, INITIAL ENCOUNTER: ICD-10-CM

## 2019-01-01 DIAGNOSIS — N17.9 ACUTE KIDNEY FAILURE, UNSPECIFIED: ICD-10-CM

## 2019-01-01 DIAGNOSIS — E11.621 TYPE 2 DIABETES MELLITUS WITH FOOT ULCER: ICD-10-CM

## 2019-01-01 DIAGNOSIS — Z89.511 ACQUIRED ABSENCE OF RIGHT LEG BELOW KNEE: ICD-10-CM

## 2019-01-01 DIAGNOSIS — I70.209 UNSPECIFIED ATHEROSCLEROSIS OF NATIVE ARTERIES OF EXTREMITIES, UNSPECIFIED EXTREMITY: ICD-10-CM
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
## 2019-01-01 DIAGNOSIS — A41.9 SEPSIS, UNSPECIFIED ORGANISM: ICD-10-CM

## 2019-01-01 DIAGNOSIS — L89.90 PRESSURE ULCER OF UNSPECIFIED SITE, UNSPECIFIED STAGE: ICD-10-CM

## 2019-01-01 DIAGNOSIS — E87.8 OTHER DISORDERS OF ELECTROLYTE AND FLUID BALANCE, NOT ELSEWHERE CLASSIFIED: ICD-10-CM

## 2019-01-01 DIAGNOSIS — J69.0 PNEUMONITIS DUE TO INHALATION OF FOOD AND VOMIT: ICD-10-CM

## 2019-01-01 DIAGNOSIS — Z91.81 HISTORY OF FALLING: ICD-10-CM

## 2019-01-01 DIAGNOSIS — F11.20 OPIOID DEPENDENCE, UNCOMPLICATED: ICD-10-CM

## 2019-01-01 DIAGNOSIS — E88.09 OTHER DISORDERS OF PLASMA-PROTEIN METABOLISM, NOT ELSEWHERE CLASSIFIED: ICD-10-CM

## 2019-01-01 DIAGNOSIS — A48.0 GAS GANGRENE: ICD-10-CM

## 2019-01-01 DIAGNOSIS — E16.2 HYPOGLYCEMIA, UNSPECIFIED: ICD-10-CM

## 2019-01-01 DIAGNOSIS — E03.9 HYPOTHYROIDISM, UNSPECIFIED: ICD-10-CM

## 2019-01-01 DIAGNOSIS — L97.509 NON-PRESSURE CHRONIC ULCER OF OTHER PART OF UNSPECIFIED FOOT WITH UNSPECIFIED SEVERITY: ICD-10-CM

## 2019-01-01 DIAGNOSIS — M86.672 OTHER CHRONIC OSTEOMYELITIS, LEFT ANKLE AND FOOT: ICD-10-CM

## 2019-01-01 DIAGNOSIS — E86.0 DEHYDRATION: ICD-10-CM

## 2019-01-01 DIAGNOSIS — R41.82 ALTERED MENTAL STATUS, UNSPECIFIED: ICD-10-CM

## 2019-01-01 DIAGNOSIS — Z88.2 ALLERGY STATUS TO SULFONAMIDES: ICD-10-CM

## 2019-01-01 DIAGNOSIS — E11.649 TYPE 2 DIABETES MELLITUS WITH HYPOGLYCEMIA WITHOUT COMA: ICD-10-CM

## 2019-01-01 DIAGNOSIS — F13.10 SEDATIVE, HYPNOTIC OR ANXIOLYTIC ABUSE, UNCOMPLICATED: ICD-10-CM

## 2019-01-01 DIAGNOSIS — E11.42 TYPE 2 DIABETES MELLITUS WITH DIABETIC POLYNEUROPATHY: ICD-10-CM

## 2019-01-01 DIAGNOSIS — E11.22 TYPE 2 DIABETES MELLITUS WITH DIABETIC CHRONIC KIDNEY DISEASE: ICD-10-CM

## 2019-01-01 DIAGNOSIS — Z89.421 ACQUIRED ABSENCE OF OTHER RIGHT TOE(S): ICD-10-CM

## 2019-01-01 DIAGNOSIS — E87.6 HYPOKALEMIA: ICD-10-CM

## 2019-01-01 DIAGNOSIS — M72.6 NECROTIZING FASCIITIS: ICD-10-CM

## 2019-01-01 DIAGNOSIS — R53.2 FUNCTIONAL QUADRIPLEGIA: ICD-10-CM

## 2019-01-01 DIAGNOSIS — T80.1XXA VASCULAR COMPLICATIONS FOLLOWING INFUSION, TRANSFUSION AND THERAPEUTIC INJECTION, INITIAL ENCOUNTER: ICD-10-CM

## 2019-01-01 DIAGNOSIS — M46.28 OSTEOMYELITIS OF VERTEBRA, SACRAL AND SACROCOCCYGEAL REGION: ICD-10-CM

## 2019-01-01 DIAGNOSIS — I80.8 PHLEBITIS AND THROMBOPHLEBITIS OF OTHER SITES: ICD-10-CM

## 2019-01-01 DIAGNOSIS — E87.5 HYPERKALEMIA: ICD-10-CM

## 2019-01-01 DIAGNOSIS — E11.52 TYPE 2 DIABETES MELLITUS WITH DIABETIC PERIPHERAL ANGIOPATHY WITH GANGRENE: ICD-10-CM

## 2019-01-01 DIAGNOSIS — I70.262 ATHEROSCLEROSIS OF NATIVE ARTERIES OF EXTREMITIES WITH GANGRENE, LEFT LEG: ICD-10-CM

## 2019-01-01 DIAGNOSIS — D63.1 ANEMIA IN CHRONIC KIDNEY DISEASE: ICD-10-CM

## 2019-01-01 DIAGNOSIS — I73.9 PERIPHERAL VASCULAR DISEASE, UNSPECIFIED: ICD-10-CM

## 2019-01-01 DIAGNOSIS — Z02.9 ENCOUNTER FOR ADMINISTRATIVE EXAMINATIONS, UNSPECIFIED: ICD-10-CM

## 2019-01-01 DIAGNOSIS — L89.320 PRESSURE ULCER OF LEFT BUTTOCK, UNSTAGEABLE: ICD-10-CM

## 2019-01-01 DIAGNOSIS — G47.00 INSOMNIA, UNSPECIFIED: ICD-10-CM

## 2019-01-01 DIAGNOSIS — B37.81 CANDIDAL ESOPHAGITIS: ICD-10-CM

## 2019-01-01 DIAGNOSIS — L89.324 PRESSURE ULCER OF LEFT BUTTOCK, STAGE 4: ICD-10-CM

## 2019-01-01 DIAGNOSIS — S32.011S: ICD-10-CM

## 2019-01-01 DIAGNOSIS — Z96.0 PRESENCE OF UROGENITAL IMPLANTS: ICD-10-CM

## 2019-01-01 DIAGNOSIS — Z71.89 OTHER SPECIFIED COUNSELING: ICD-10-CM

## 2019-01-01 DIAGNOSIS — R33.9 RETENTION OF URINE, UNSPECIFIED: ICD-10-CM

## 2019-01-01 DIAGNOSIS — M54.5 LOW BACK PAIN: ICD-10-CM

## 2019-01-01 DIAGNOSIS — N31.9 NEUROMUSCULAR DYSFUNCTION OF BLADDER, UNSPECIFIED: ICD-10-CM

## 2019-01-01 DIAGNOSIS — T83.030A LEAKAGE OF CYSTOSTOMY CATHETER, INITIAL ENCOUNTER: ICD-10-CM

## 2019-01-01 DIAGNOSIS — E83.51 HYPOCALCEMIA: ICD-10-CM

## 2019-01-01 DIAGNOSIS — D50.9 IRON DEFICIENCY ANEMIA, UNSPECIFIED: ICD-10-CM

## 2019-01-01 DIAGNOSIS — T40.2X1A POISONING BY OTHER OPIOIDS, ACCIDENTAL (UNINTENTIONAL), INITIAL ENCOUNTER: ICD-10-CM
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
## 2019-01-01 DIAGNOSIS — Z99.3 DEPENDENCE ON WHEELCHAIR: ICD-10-CM

## 2019-01-01 DIAGNOSIS — L89.620 PRESSURE ULCER OF LEFT HEEL, UNSTAGEABLE: ICD-10-CM

## 2019-01-01 DIAGNOSIS — N17.0 ACUTE KIDNEY FAILURE WITH TUBULAR NECROSIS: ICD-10-CM

## 2019-01-01 DIAGNOSIS — E43 UNSPECIFIED SEVERE PROTEIN-CALORIE MALNUTRITION: ICD-10-CM

## 2019-01-01 DIAGNOSIS — F17.200 NICOTINE DEPENDENCE, UNSPECIFIED, UNCOMPLICATED: ICD-10-CM

## 2019-01-01 DIAGNOSIS — L89.322 PRESSURE ULCER OF LEFT BUTTOCK, STAGE 2: ICD-10-CM

## 2019-01-01 DIAGNOSIS — R11.2 NAUSEA WITH VOMITING, UNSPECIFIED: ICD-10-CM

## 2019-01-01 DIAGNOSIS — L89.520 PRESSURE ULCER OF LEFT ANKLE, UNSTAGEABLE: ICD-10-CM

## 2019-01-01 DIAGNOSIS — R53.1 WEAKNESS: ICD-10-CM

## 2019-01-01 DIAGNOSIS — T50.901D POISONING BY UNSPECIFIED DRUGS, MEDICAMENTS AND BIOLOGICAL SUBSTANCES, ACCIDENTAL (UNINTENTIONAL), SUBSEQUENT ENCOUNTER: ICD-10-CM

## 2019-01-01 DIAGNOSIS — T83.020A DISPLACEMENT OF CYSTOSTOMY CATHETER, INITIAL ENCOUNTER: ICD-10-CM

## 2019-01-01 DIAGNOSIS — Z48.02 ENCOUNTER FOR REMOVAL OF SUTURES: ICD-10-CM

## 2019-01-01 LAB
-  AMIKACIN: SIGNIFICANT CHANGE UP
-  AMOXICILLIN/CLAVULANIC ACID: SIGNIFICANT CHANGE UP
-  AMPICILLIN/SULBACTAM: SIGNIFICANT CHANGE UP
-  AMPICILLIN: SIGNIFICANT CHANGE UP
-  AZTREONAM: SIGNIFICANT CHANGE UP
-  CEFAZOLIN: SIGNIFICANT CHANGE UP
-  CEFEPIME: SIGNIFICANT CHANGE UP
-  CEFOXITIN: SIGNIFICANT CHANGE UP
-  CEFTRIAXONE: SIGNIFICANT CHANGE UP
-  CIPROFLOXACIN: SIGNIFICANT CHANGE UP
-  CLINDAMYCIN: SIGNIFICANT CHANGE UP
-  DAPTOMYCIN: SIGNIFICANT CHANGE UP
-  ERTAPENEM: SIGNIFICANT CHANGE UP
-  ERYTHROMYCIN: SIGNIFICANT CHANGE UP
-  GENTAMICIN: SIGNIFICANT CHANGE UP
-  IMIPENEM: SIGNIFICANT CHANGE UP
-  LEVOFLOXACIN: SIGNIFICANT CHANGE UP
-  LINEZOLID: SIGNIFICANT CHANGE UP
-  MEROPENEM: SIGNIFICANT CHANGE UP
-  NITROFURANTOIN: SIGNIFICANT CHANGE UP
-  NITROFURANTOIN: SIGNIFICANT CHANGE UP
-  OXACILLIN: SIGNIFICANT CHANGE UP
-  PENICILLIN: SIGNIFICANT CHANGE UP
-  PIPERACILLIN/TAZOBACTAM: SIGNIFICANT CHANGE UP
-  RIFAMPIN: SIGNIFICANT CHANGE UP
-  TETRACYCLINE: SIGNIFICANT CHANGE UP
-  TIGECYCLINE: SIGNIFICANT CHANGE UP
-  TIGECYCLINE: SIGNIFICANT CHANGE UP
-  TOBRAMYCIN: SIGNIFICANT CHANGE UP
-  TRIMETHOPRIM/SULFAMETHOXAZOLE: SIGNIFICANT CHANGE UP
-  VANCOMYCIN: SIGNIFICANT CHANGE UP
24R-OH-CALCIDIOL SERPL-MCNC: <5 NG/ML — LOW (ref 30–80)
ACTH SER-ACNC: 4.8 PG/ML — LOW (ref 7.2–63.3)
ALBUMIN SERPL ELPH-MCNC: 1.1 G/DL — LOW (ref 3.5–5.2)
ALBUMIN SERPL ELPH-MCNC: 1.1 G/DL — LOW (ref 3.5–5.2)
ALBUMIN SERPL ELPH-MCNC: 1.2 G/DL — LOW (ref 3.5–5.2)
ALBUMIN SERPL ELPH-MCNC: 1.3 G/DL — LOW (ref 3.5–5.2)
ALBUMIN SERPL ELPH-MCNC: 1.4 G/DL — LOW (ref 3.5–5.2)
ALBUMIN SERPL ELPH-MCNC: 1.5 G/DL — LOW (ref 3.5–5.2)
ALBUMIN SERPL ELPH-MCNC: 1.6 G/DL — LOW (ref 3.5–5.2)
ALBUMIN SERPL ELPH-MCNC: 1.7 G/DL — LOW (ref 3.5–5.2)
ALBUMIN SERPL ELPH-MCNC: 1.8 G/DL — LOW (ref 3.5–5.2)
ALBUMIN SERPL ELPH-MCNC: 1.9 G/DL — LOW (ref 3.5–5.2)
ALBUMIN SERPL ELPH-MCNC: 2 G/DL — LOW (ref 3.5–5.2)
ALP SERPL-CCNC: 129 U/L — HIGH (ref 30–115)
ALP SERPL-CCNC: 142 U/L — HIGH (ref 30–115)
ALP SERPL-CCNC: 143 U/L — HIGH (ref 30–115)
ALP SERPL-CCNC: 146 U/L — HIGH (ref 30–115)
ALP SERPL-CCNC: 146 U/L — HIGH (ref 30–115)
ALP SERPL-CCNC: 150 U/L — HIGH (ref 30–115)
ALP SERPL-CCNC: 151 U/L — HIGH (ref 30–115)
ALP SERPL-CCNC: 152 U/L — HIGH (ref 30–115)
ALP SERPL-CCNC: 153 U/L — HIGH (ref 30–115)
ALP SERPL-CCNC: 155 U/L — HIGH (ref 30–115)
ALP SERPL-CCNC: 155 U/L — HIGH (ref 30–115)
ALP SERPL-CCNC: 156 U/L — HIGH (ref 30–115)
ALP SERPL-CCNC: 156 U/L — HIGH (ref 30–115)
ALP SERPL-CCNC: 158 U/L — HIGH (ref 30–115)
ALP SERPL-CCNC: 158 U/L — HIGH (ref 30–115)
ALP SERPL-CCNC: 160 U/L — HIGH (ref 30–115)
ALP SERPL-CCNC: 161 U/L — HIGH (ref 30–115)
ALP SERPL-CCNC: 163 U/L — HIGH (ref 30–115)
ALP SERPL-CCNC: 163 U/L — HIGH (ref 30–115)
ALP SERPL-CCNC: 164 U/L — HIGH (ref 30–115)
ALP SERPL-CCNC: 165 U/L — HIGH (ref 30–115)
ALP SERPL-CCNC: 170 U/L — HIGH (ref 30–115)
ALP SERPL-CCNC: 172 U/L — HIGH (ref 30–115)
ALP SERPL-CCNC: 174 U/L — HIGH (ref 30–115)
ALP SERPL-CCNC: 177 U/L — HIGH (ref 30–115)
ALP SERPL-CCNC: 179 U/L — HIGH (ref 30–115)
ALP SERPL-CCNC: 186 U/L — HIGH (ref 30–115)
ALP SERPL-CCNC: 189 U/L — HIGH (ref 30–115)
ALP SERPL-CCNC: 211 U/L — HIGH (ref 30–115)
ALP SERPL-CCNC: 235 U/L — HIGH (ref 30–115)
ALT FLD-CCNC: 10 U/L — SIGNIFICANT CHANGE UP (ref 0–41)
ALT FLD-CCNC: 11 U/L — SIGNIFICANT CHANGE UP (ref 0–41)
ALT FLD-CCNC: 12 U/L — SIGNIFICANT CHANGE UP (ref 0–41)
ALT FLD-CCNC: 12 U/L — SIGNIFICANT CHANGE UP (ref 0–41)
ALT FLD-CCNC: 13 U/L — SIGNIFICANT CHANGE UP (ref 0–41)
ALT FLD-CCNC: 14 U/L — SIGNIFICANT CHANGE UP (ref 0–41)
ALT FLD-CCNC: 15 U/L — SIGNIFICANT CHANGE UP (ref 0–41)
ALT FLD-CCNC: 15 U/L — SIGNIFICANT CHANGE UP (ref 0–41)
ALT FLD-CCNC: 17 U/L — SIGNIFICANT CHANGE UP (ref 0–41)
ALT FLD-CCNC: 19 U/L — SIGNIFICANT CHANGE UP (ref 0–41)
ALT FLD-CCNC: 19 U/L — SIGNIFICANT CHANGE UP (ref 0–41)
ALT FLD-CCNC: 20 U/L — SIGNIFICANT CHANGE UP (ref 0–41)
ALT FLD-CCNC: 6 U/L — SIGNIFICANT CHANGE UP (ref 0–41)
ALT FLD-CCNC: 7 U/L — SIGNIFICANT CHANGE UP (ref 0–41)
ALT FLD-CCNC: 9 U/L — SIGNIFICANT CHANGE UP (ref 0–41)
ALT FLD-CCNC: <5 U/L — SIGNIFICANT CHANGE UP (ref 0–41)
AMPHET UR-MCNC: NEGATIVE — SIGNIFICANT CHANGE UP
AMPHET UR-MCNC: NEGATIVE — SIGNIFICANT CHANGE UP
ANA TITR SER: NEGATIVE — SIGNIFICANT CHANGE UP
ANION GAP SERPL CALC-SCNC: 10 MMOL/L — SIGNIFICANT CHANGE UP (ref 7–14)
ANION GAP SERPL CALC-SCNC: 11 MMOL/L — SIGNIFICANT CHANGE UP (ref 7–14)
ANION GAP SERPL CALC-SCNC: 12 MMOL/L — SIGNIFICANT CHANGE UP (ref 7–14)
ANION GAP SERPL CALC-SCNC: 13 MMOL/L — SIGNIFICANT CHANGE UP (ref 7–14)
ANION GAP SERPL CALC-SCNC: 14 MMOL/L — SIGNIFICANT CHANGE UP (ref 7–14)
ANION GAP SERPL CALC-SCNC: 15 MMOL/L — HIGH (ref 7–14)
ANION GAP SERPL CALC-SCNC: 16 MMOL/L — HIGH (ref 7–14)
ANION GAP SERPL CALC-SCNC: 17 MMOL/L — HIGH (ref 7–14)
ANION GAP SERPL CALC-SCNC: 19 MMOL/L — HIGH (ref 7–14)
ANION GAP SERPL CALC-SCNC: 8 MMOL/L — SIGNIFICANT CHANGE UP (ref 7–14)
ANISOCYTOSIS BLD QL: SIGNIFICANT CHANGE UP
ANISOCYTOSIS BLD QL: SLIGHT — SIGNIFICANT CHANGE UP
ANISOCYTOSIS BLD QL: SLIGHT — SIGNIFICANT CHANGE UP
APAP SERPL-MCNC: <5 UG/ML — LOW (ref 10–30)
APAP SERPL-MCNC: <5 UG/ML — LOW (ref 10–30)
APPEARANCE UR: ABNORMAL
APPEARANCE UR: CLEAR — SIGNIFICANT CHANGE UP
APPEARANCE UR: CLEAR — SIGNIFICANT CHANGE UP
APTT BLD: 29 SEC — SIGNIFICANT CHANGE UP (ref 27–39.2)
APTT BLD: 36.7 SEC — SIGNIFICANT CHANGE UP (ref 27–39.2)
APTT BLD: 37.8 SEC — SIGNIFICANT CHANGE UP (ref 27–39.2)
APTT BLD: 38.5 SEC — SIGNIFICANT CHANGE UP (ref 27–39.2)
APTT BLD: 38.6 SEC — SIGNIFICANT CHANGE UP (ref 27–39.2)
APTT BLD: 38.8 SEC — SIGNIFICANT CHANGE UP (ref 27–39.2)
APTT BLD: 38.9 SEC — SIGNIFICANT CHANGE UP (ref 27–39.2)
APTT BLD: 39.4 SEC — HIGH (ref 27–39.2)
APTT BLD: 40.4 SEC — HIGH (ref 27–39.2)
APTT BLD: 40.7 SEC — HIGH (ref 27–39.2)
APTT BLD: 41 SEC — HIGH (ref 27–39.2)
APTT BLD: 42.3 SEC — HIGH (ref 27–39.2)
APTT BLD: 44.9 SEC — HIGH (ref 27–39.2)
APTT BLD: 45.6 SEC — HIGH (ref 27–39.2)
APTT BLD: 48.3 SEC — HIGH (ref 27–39.2)
APTT BLD: 56.1 SEC — HIGH (ref 27–39.2)
AST SERPL-CCNC: 10 U/L — SIGNIFICANT CHANGE UP (ref 0–41)
AST SERPL-CCNC: 11 U/L — SIGNIFICANT CHANGE UP (ref 0–41)
AST SERPL-CCNC: 12 U/L — SIGNIFICANT CHANGE UP (ref 0–41)
AST SERPL-CCNC: 13 U/L — SIGNIFICANT CHANGE UP (ref 0–41)
AST SERPL-CCNC: 14 U/L — SIGNIFICANT CHANGE UP (ref 0–41)
AST SERPL-CCNC: 15 U/L — SIGNIFICANT CHANGE UP (ref 0–41)
AST SERPL-CCNC: 16 U/L — SIGNIFICANT CHANGE UP (ref 0–41)
AST SERPL-CCNC: 16 U/L — SIGNIFICANT CHANGE UP (ref 0–41)
AST SERPL-CCNC: 17 U/L — SIGNIFICANT CHANGE UP (ref 0–41)
AST SERPL-CCNC: 18 U/L — SIGNIFICANT CHANGE UP (ref 0–41)
AST SERPL-CCNC: 19 U/L — SIGNIFICANT CHANGE UP (ref 0–41)
AST SERPL-CCNC: 19 U/L — SIGNIFICANT CHANGE UP (ref 0–41)
AST SERPL-CCNC: 24 U/L — SIGNIFICANT CHANGE UP (ref 0–41)
AST SERPL-CCNC: 31 U/L — SIGNIFICANT CHANGE UP (ref 0–41)
AST SERPL-CCNC: 34 U/L — SIGNIFICANT CHANGE UP (ref 0–41)
B-OH-BUTYR SERPL-SCNC: <0.2 MMOL/L — SIGNIFICANT CHANGE UP
BACTERIA # UR AUTO: ABNORMAL
BACTERIA # UR AUTO: ABNORMAL /HPF
BARBITURATES UR SCN-MCNC: NEGATIVE — SIGNIFICANT CHANGE UP
BARBITURATES UR SCN-MCNC: NEGATIVE — SIGNIFICANT CHANGE UP
BASE EXCESS BLDA CALC-SCNC: -6.4 MMOL/L — LOW (ref -2–2)
BASE EXCESS BLDA CALC-SCNC: 7.7 MMOL/L — HIGH (ref -2–2)
BASE EXCESS BLDV CALC-SCNC: -10.1 MMOL/L — LOW (ref -2–2)
BASE EXCESS BLDV CALC-SCNC: -12.3 MMOL/L — LOW (ref -2–2)
BASE EXCESS BLDV CALC-SCNC: -13.6 MMOL/L — LOW (ref -2–2)
BASE EXCESS BLDV CALC-SCNC: -16.1 MMOL/L — LOW (ref -2–2)
BASOPHILS # BLD AUTO: 0 K/UL — SIGNIFICANT CHANGE UP (ref 0–0.2)
BASOPHILS # BLD AUTO: 0 K/UL — SIGNIFICANT CHANGE UP (ref 0–0.2)
BASOPHILS # BLD AUTO: 0.03 K/UL — SIGNIFICANT CHANGE UP (ref 0–0.2)
BASOPHILS # BLD AUTO: 0.03 K/UL — SIGNIFICANT CHANGE UP (ref 0–0.2)
BASOPHILS # BLD AUTO: 0.05 K/UL — SIGNIFICANT CHANGE UP (ref 0–0.2)
BASOPHILS # BLD AUTO: 0.06 K/UL — SIGNIFICANT CHANGE UP (ref 0–0.2)
BASOPHILS # BLD AUTO: 0.07 K/UL — SIGNIFICANT CHANGE UP (ref 0–0.2)
BASOPHILS # BLD AUTO: 0.08 K/UL — SIGNIFICANT CHANGE UP (ref 0–0.2)
BASOPHILS # BLD AUTO: 0.09 K/UL — SIGNIFICANT CHANGE UP (ref 0–0.2)
BASOPHILS # BLD AUTO: 0.1 K/UL — SIGNIFICANT CHANGE UP (ref 0–0.2)
BASOPHILS # BLD AUTO: 0.11 K/UL — SIGNIFICANT CHANGE UP (ref 0–0.2)
BASOPHILS # BLD AUTO: 0.12 K/UL — SIGNIFICANT CHANGE UP (ref 0–0.2)
BASOPHILS # BLD AUTO: 0.13 K/UL — SIGNIFICANT CHANGE UP (ref 0–0.2)
BASOPHILS # BLD AUTO: 0.14 K/UL — SIGNIFICANT CHANGE UP (ref 0–0.2)
BASOPHILS # BLD AUTO: 0.14 K/UL — SIGNIFICANT CHANGE UP (ref 0–0.2)
BASOPHILS # BLD AUTO: 0.15 K/UL — SIGNIFICANT CHANGE UP (ref 0–0.2)
BASOPHILS # BLD AUTO: 0.17 K/UL — SIGNIFICANT CHANGE UP (ref 0–0.2)
BASOPHILS # BLD AUTO: 0.18 K/UL — SIGNIFICANT CHANGE UP (ref 0–0.2)
BASOPHILS # BLD AUTO: 0.19 K/UL — SIGNIFICANT CHANGE UP (ref 0–0.2)
BASOPHILS NFR BLD AUTO: 0 % — SIGNIFICANT CHANGE UP (ref 0–1)
BASOPHILS NFR BLD AUTO: 0 % — SIGNIFICANT CHANGE UP (ref 0–1)
BASOPHILS NFR BLD AUTO: 0.3 % — SIGNIFICANT CHANGE UP (ref 0–1)
BASOPHILS NFR BLD AUTO: 0.3 % — SIGNIFICANT CHANGE UP (ref 0–1)
BASOPHILS NFR BLD AUTO: 0.4 % — SIGNIFICANT CHANGE UP (ref 0–1)
BASOPHILS NFR BLD AUTO: 0.4 % — SIGNIFICANT CHANGE UP (ref 0–1)
BASOPHILS NFR BLD AUTO: 0.5 % — SIGNIFICANT CHANGE UP (ref 0–1)
BASOPHILS NFR BLD AUTO: 0.6 % — SIGNIFICANT CHANGE UP (ref 0–1)
BASOPHILS NFR BLD AUTO: 0.6 % — SIGNIFICANT CHANGE UP (ref 0–1)
BASOPHILS NFR BLD AUTO: 0.7 % — SIGNIFICANT CHANGE UP (ref 0–1)
BASOPHILS NFR BLD AUTO: 0.8 % — SIGNIFICANT CHANGE UP (ref 0–1)
BASOPHILS NFR BLD AUTO: 0.9 % — SIGNIFICANT CHANGE UP (ref 0–1)
BASOPHILS NFR BLD AUTO: 1 % — SIGNIFICANT CHANGE UP (ref 0–1)
BASOPHILS NFR BLD AUTO: 1.1 % — HIGH (ref 0–1)
BASOPHILS NFR BLD AUTO: 1.2 % — HIGH (ref 0–1)
BASOPHILS NFR BLD AUTO: 1.3 % — HIGH (ref 0–1)
BASOPHILS NFR BLD AUTO: 1.4 % — HIGH (ref 0–1)
BASOPHILS NFR BLD AUTO: 1.5 % — HIGH (ref 0–1)
BASOPHILS NFR BLD AUTO: 1.6 % — HIGH (ref 0–1)
BASOPHILS NFR BLD AUTO: 1.6 % — HIGH (ref 0–1)
BASOPHILS NFR BLD AUTO: 1.7 % — HIGH (ref 0–1)
BASOPHILS NFR BLD AUTO: 1.8 % — HIGH (ref 0–1)
BENZODIAZ UR-MCNC: POSITIVE
BENZODIAZ UR-MCNC: POSITIVE
BILIRUB DIRECT SERPL-MCNC: 0.2 MG/DL — SIGNIFICANT CHANGE UP (ref 0–0.2)
BILIRUB DIRECT SERPL-MCNC: <0.2 MG/DL — SIGNIFICANT CHANGE UP (ref 0–0.2)
BILIRUB DIRECT SERPL-MCNC: <0.2 MG/DL — SIGNIFICANT CHANGE UP (ref 0–0.2)
BILIRUB INDIRECT FLD-MCNC: 0 MG/DL — SIGNIFICANT CHANGE UP (ref 0.2–1.2)
BILIRUB INDIRECT FLD-MCNC: 0 MG/DL — SIGNIFICANT CHANGE UP (ref 0.2–1.2)
BILIRUB INDIRECT FLD-MCNC: <0 MG/DL — LOW (ref 0.2–1.2)
BILIRUB SERPL-MCNC: 0.2 MG/DL — SIGNIFICANT CHANGE UP (ref 0.2–1.2)
BILIRUB SERPL-MCNC: 0.2 MG/DL — SIGNIFICANT CHANGE UP (ref 0.2–1.2)
BILIRUB SERPL-MCNC: 0.4 MG/DL — SIGNIFICANT CHANGE UP (ref 0.2–1.2)
BILIRUB SERPL-MCNC: <0.2 MG/DL — SIGNIFICANT CHANGE UP (ref 0.2–1.2)
BILIRUB UR-MCNC: ABNORMAL
BILIRUB UR-MCNC: NEGATIVE — SIGNIFICANT CHANGE UP
BLD GP AB SCN SERPL QL: SIGNIFICANT CHANGE UP
BUN SERPL-MCNC: 29 MG/DL — HIGH (ref 10–20)
BUN SERPL-MCNC: 29 MG/DL — HIGH (ref 10–20)
BUN SERPL-MCNC: 30 MG/DL — HIGH (ref 10–20)
BUN SERPL-MCNC: 32 MG/DL — HIGH (ref 10–20)
BUN SERPL-MCNC: 32 MG/DL — HIGH (ref 10–20)
BUN SERPL-MCNC: 33 MG/DL — HIGH (ref 10–20)
BUN SERPL-MCNC: 34 MG/DL — HIGH (ref 10–20)
BUN SERPL-MCNC: 34 MG/DL — HIGH (ref 10–20)
BUN SERPL-MCNC: 35 MG/DL — HIGH (ref 10–20)
BUN SERPL-MCNC: 36 MG/DL — HIGH (ref 10–20)
BUN SERPL-MCNC: 37 MG/DL — HIGH (ref 10–20)
BUN SERPL-MCNC: 38 MG/DL — HIGH (ref 10–20)
BUN SERPL-MCNC: 39 MG/DL — HIGH (ref 10–20)
BUN SERPL-MCNC: 40 MG/DL — HIGH (ref 10–20)
BUN SERPL-MCNC: 41 MG/DL — HIGH (ref 10–20)
BUN SERPL-MCNC: 41 MG/DL — HIGH (ref 10–20)
BUN SERPL-MCNC: 42 MG/DL — HIGH (ref 10–20)
BUN SERPL-MCNC: 43 MG/DL — HIGH (ref 10–20)
BUN SERPL-MCNC: 44 MG/DL — HIGH (ref 10–20)
BUN SERPL-MCNC: 44 MG/DL — HIGH (ref 10–20)
BUN SERPL-MCNC: 45 MG/DL — HIGH (ref 10–20)
BUN SERPL-MCNC: 46 MG/DL — HIGH (ref 10–20)
BUN SERPL-MCNC: 47 MG/DL — HIGH (ref 10–20)
BUN SERPL-MCNC: 48 MG/DL — HIGH (ref 10–20)
BUN SERPL-MCNC: 48 MG/DL — HIGH (ref 10–20)
BUN SERPL-MCNC: 50 MG/DL — HIGH (ref 10–20)
BUN SERPL-MCNC: 51 MG/DL — HIGH (ref 10–20)
BUN SERPL-MCNC: 51 MG/DL — HIGH (ref 10–20)
BUN SERPL-MCNC: 52 MG/DL — HIGH (ref 10–20)
BUN SERPL-MCNC: 53 MG/DL — HIGH (ref 10–20)
BUN SERPL-MCNC: 53 MG/DL — HIGH (ref 10–20)
BUN SERPL-MCNC: 54 MG/DL — HIGH (ref 10–20)
BUN SERPL-MCNC: 55 MG/DL — HIGH (ref 10–20)
BUN SERPL-MCNC: 58 MG/DL — HIGH (ref 10–20)
BUN SERPL-MCNC: 58 MG/DL — HIGH (ref 10–20)
BUN SERPL-MCNC: 59 MG/DL — HIGH (ref 10–20)
BUN SERPL-MCNC: 61 MG/DL — CRITICAL HIGH (ref 10–20)
BUN SERPL-MCNC: 62 MG/DL — CRITICAL HIGH (ref 10–20)
BUN SERPL-MCNC: 63 MG/DL — CRITICAL HIGH (ref 10–20)
BUN SERPL-MCNC: 65 MG/DL — CRITICAL HIGH (ref 10–20)
BUN SERPL-MCNC: 66 MG/DL — CRITICAL HIGH (ref 10–20)
BUN SERPL-MCNC: 66 MG/DL — CRITICAL HIGH (ref 10–20)
BUN SERPL-MCNC: 67 MG/DL — CRITICAL HIGH (ref 10–20)
BUN SERPL-MCNC: 67 MG/DL — CRITICAL HIGH (ref 10–20)
BUN SERPL-MCNC: 68 MG/DL — CRITICAL HIGH (ref 10–20)
BUN SERPL-MCNC: 69 MG/DL — CRITICAL HIGH (ref 10–20)
BUN SERPL-MCNC: 70 MG/DL — CRITICAL HIGH (ref 10–20)
BUN SERPL-MCNC: 71 MG/DL — CRITICAL HIGH (ref 10–20)
BUN SERPL-MCNC: 71 MG/DL — CRITICAL HIGH (ref 10–20)
BUN SERPL-MCNC: 72 MG/DL — CRITICAL HIGH (ref 10–20)
BUN SERPL-MCNC: 74 MG/DL — CRITICAL HIGH (ref 10–20)
BUN SERPL-MCNC: 75 MG/DL — CRITICAL HIGH (ref 10–20)
BUN SERPL-MCNC: 76 MG/DL — CRITICAL HIGH (ref 10–20)
BUN SERPL-MCNC: 79 MG/DL — CRITICAL HIGH (ref 10–20)
BUN SERPL-MCNC: 82 MG/DL — CRITICAL HIGH (ref 10–20)
C PEPTIDE SERPL-MCNC: 1.8 NG/ML — SIGNIFICANT CHANGE UP (ref 1.1–4.4)
C3 SERPL-MCNC: 104 MG/DL — SIGNIFICANT CHANGE UP (ref 81–157)
C4 SERPL-MCNC: 24 MG/DL — SIGNIFICANT CHANGE UP (ref 13–39)
CA-I SERPL-SCNC: 1.02 MMOL/L — LOW (ref 1.12–1.3)
CA-I SERPL-SCNC: 1.03 MMOL/L — LOW (ref 1.12–1.3)
CA-I SERPL-SCNC: 1.2 MMOL/L — SIGNIFICANT CHANGE UP (ref 1.12–1.3)
CA-I SERPL-SCNC: 1.24 MMOL/L — SIGNIFICANT CHANGE UP (ref 1.12–1.3)
CALCIUM SERPL-MCNC: 5.9 MG/DL — CRITICAL LOW (ref 8.5–10.1)
CALCIUM SERPL-MCNC: 6 MG/DL — LOW (ref 8.5–10.1)
CALCIUM SERPL-MCNC: 6.1 MG/DL — LOW (ref 8.5–10.1)
CALCIUM SERPL-MCNC: 6.3 MG/DL — LOW (ref 8.5–10.1)
CALCIUM SERPL-MCNC: 6.5 MG/DL — LOW (ref 8.5–10.1)
CALCIUM SERPL-MCNC: 6.7 MG/DL — LOW (ref 8.5–10.1)
CALCIUM SERPL-MCNC: 6.9 MG/DL — LOW (ref 8.5–10.1)
CALCIUM SERPL-MCNC: 7 MG/DL — LOW (ref 8.4–10.5)
CALCIUM SERPL-MCNC: 7 MG/DL — LOW (ref 8.5–10.1)
CALCIUM SERPL-MCNC: 7 MG/DL — LOW (ref 8.5–10.1)
CALCIUM SERPL-MCNC: 7.1 MG/DL — LOW (ref 8.5–10.1)
CALCIUM SERPL-MCNC: 7.1 MG/DL — LOW (ref 8.5–10.1)
CALCIUM SERPL-MCNC: 7.2 MG/DL — LOW (ref 8.5–10.1)
CALCIUM SERPL-MCNC: 7.3 MG/DL — LOW (ref 8.5–10.1)
CALCIUM SERPL-MCNC: 7.4 MG/DL — LOW (ref 8.5–10.1)
CALCIUM SERPL-MCNC: 7.5 MG/DL — LOW (ref 8.5–10.1)
CALCIUM SERPL-MCNC: 7.6 MG/DL — LOW (ref 8.5–10.1)
CALCIUM SERPL-MCNC: 7.7 MG/DL — LOW (ref 8.5–10.1)
CALCIUM SERPL-MCNC: 7.8 MG/DL — LOW (ref 8.4–10.5)
CALCIUM SERPL-MCNC: 7.8 MG/DL — LOW (ref 8.4–10.5)
CALCIUM SERPL-MCNC: 7.8 MG/DL — LOW (ref 8.5–10.1)
CALCIUM SERPL-MCNC: 7.9 MG/DL — LOW (ref 8.5–10.1)
CALCIUM SERPL-MCNC: 8 MG/DL — LOW (ref 8.5–10.1)
CALCIUM SERPL-MCNC: 8.1 MG/DL — LOW (ref 8.5–10.1)
CALCIUM SERPL-MCNC: 8.2 MG/DL — LOW (ref 8.5–10.1)
CALCIUM SERPL-MCNC: 8.3 MG/DL — LOW (ref 8.5–10.1)
CALCIUM SERPL-MCNC: 8.3 MG/DL — LOW (ref 8.5–10.1)
CALCIUM UR-MCNC: 3 MG/DL — SIGNIFICANT CHANGE UP
CHLORIDE SERPL-SCNC: 100 MMOL/L — SIGNIFICANT CHANGE UP (ref 98–110)
CHLORIDE SERPL-SCNC: 102 MMOL/L — SIGNIFICANT CHANGE UP (ref 98–110)
CHLORIDE SERPL-SCNC: 103 MMOL/L — SIGNIFICANT CHANGE UP (ref 98–110)
CHLORIDE SERPL-SCNC: 104 MMOL/L — SIGNIFICANT CHANGE UP (ref 98–110)
CHLORIDE SERPL-SCNC: 105 MMOL/L — SIGNIFICANT CHANGE UP (ref 98–110)
CHLORIDE SERPL-SCNC: 106 MMOL/L — SIGNIFICANT CHANGE UP (ref 98–110)
CHLORIDE SERPL-SCNC: 107 MMOL/L — SIGNIFICANT CHANGE UP (ref 98–110)
CHLORIDE SERPL-SCNC: 108 MMOL/L — SIGNIFICANT CHANGE UP (ref 98–110)
CHLORIDE SERPL-SCNC: 109 MMOL/L — SIGNIFICANT CHANGE UP (ref 98–110)
CHLORIDE SERPL-SCNC: 110 MMOL/L — SIGNIFICANT CHANGE UP (ref 98–110)
CHLORIDE SERPL-SCNC: 111 MMOL/L — HIGH (ref 98–110)
CHLORIDE SERPL-SCNC: 112 MMOL/L — HIGH (ref 98–110)
CHLORIDE SERPL-SCNC: 113 MMOL/L — HIGH (ref 98–110)
CHLORIDE SERPL-SCNC: 113 MMOL/L — HIGH (ref 98–110)
CHLORIDE SERPL-SCNC: 115 MMOL/L — HIGH (ref 98–110)
CHLORIDE SERPL-SCNC: 117 MMOL/L — HIGH (ref 98–110)
CHLORIDE SERPL-SCNC: 94 MMOL/L — LOW (ref 98–110)
CHLORIDE SERPL-SCNC: 95 MMOL/L — LOW (ref 98–110)
CHLORIDE SERPL-SCNC: 96 MMOL/L — LOW (ref 98–110)
CHLORIDE SERPL-SCNC: 96 MMOL/L — LOW (ref 98–110)
CHLORIDE SERPL-SCNC: 97 MMOL/L — LOW (ref 98–110)
CHLORIDE SERPL-SCNC: 97 MMOL/L — LOW (ref 98–110)
CHLORIDE SERPL-SCNC: 98 MMOL/L — SIGNIFICANT CHANGE UP (ref 98–110)
CHLORIDE SERPL-SCNC: 99 MMOL/L — SIGNIFICANT CHANGE UP (ref 98–110)
CHLORIDE SERPL-SCNC: 99 MMOL/L — SIGNIFICANT CHANGE UP (ref 98–110)
CHLORIDE UR-SCNC: 26 — SIGNIFICANT CHANGE UP
CHLORIDE UR-SCNC: 51 — SIGNIFICANT CHANGE UP
CHLORIDE UR-SCNC: 73 — SIGNIFICANT CHANGE UP
CK MB CFR SERPL CALC: 1.5 NG/ML — SIGNIFICANT CHANGE UP (ref 0.6–6.3)
CK MB CFR SERPL CALC: 1.7 NG/ML — SIGNIFICANT CHANGE UP (ref 0.6–6.3)
CK MB CFR SERPL CALC: 1.7 NG/ML — SIGNIFICANT CHANGE UP (ref 0.6–6.3)
CK SERPL-CCNC: 13 U/L — SIGNIFICANT CHANGE UP (ref 0–225)
CK SERPL-CCNC: 20 U/L — SIGNIFICANT CHANGE UP (ref 0–225)
CK SERPL-CCNC: 37 U/L — SIGNIFICANT CHANGE UP (ref 0–225)
CK SERPL-CCNC: 8 U/L — SIGNIFICANT CHANGE UP (ref 0–225)
CK SERPL-CCNC: 9 U/L — SIGNIFICANT CHANGE UP (ref 0–225)
CO2 SERPL-SCNC: 12 MMOL/L — LOW (ref 17–32)
CO2 SERPL-SCNC: 13 MMOL/L — LOW (ref 17–32)
CO2 SERPL-SCNC: 14 MMOL/L — LOW (ref 17–32)
CO2 SERPL-SCNC: 15 MMOL/L — LOW (ref 17–32)
CO2 SERPL-SCNC: 16 MMOL/L — LOW (ref 17–32)
CO2 SERPL-SCNC: 17 MMOL/L — SIGNIFICANT CHANGE UP (ref 17–32)
CO2 SERPL-SCNC: 18 MMOL/L — SIGNIFICANT CHANGE UP (ref 17–32)
CO2 SERPL-SCNC: 19 MMOL/L — SIGNIFICANT CHANGE UP (ref 17–32)
CO2 SERPL-SCNC: 20 MMOL/L — SIGNIFICANT CHANGE UP (ref 17–32)
CO2 SERPL-SCNC: 21 MMOL/L — SIGNIFICANT CHANGE UP (ref 17–32)
CO2 SERPL-SCNC: 22 MMOL/L — SIGNIFICANT CHANGE UP (ref 17–32)
CO2 SERPL-SCNC: 23 MMOL/L — SIGNIFICANT CHANGE UP (ref 17–32)
CO2 SERPL-SCNC: 24 MMOL/L — SIGNIFICANT CHANGE UP (ref 17–32)
CO2 SERPL-SCNC: 25 MMOL/L — SIGNIFICANT CHANGE UP (ref 17–32)
CO2 SERPL-SCNC: 26 MMOL/L — SIGNIFICANT CHANGE UP (ref 17–32)
CO2 SERPL-SCNC: 26 MMOL/L — SIGNIFICANT CHANGE UP (ref 17–32)
CO2 SERPL-SCNC: 27 MMOL/L — SIGNIFICANT CHANGE UP (ref 17–32)
COCAINE METAB.OTHER UR-MCNC: NEGATIVE — SIGNIFICANT CHANGE UP
COCAINE METAB.OTHER UR-MCNC: NEGATIVE — SIGNIFICANT CHANGE UP
COLOR SPEC: SIGNIFICANT CHANGE UP
COLOR SPEC: YELLOW — SIGNIFICANT CHANGE UP
COMMENT - URINE: SIGNIFICANT CHANGE UP
COMMENT - URINE: SIGNIFICANT CHANGE UP
CORTICOSTEROID BINDING GLOBULIN RESULT: 1.2 MG/DL — LOW
CORTICOSTEROID BINDING GLOBULIN RESULT: 1.5 MG/DL — LOW
CORTIS AM PEAK SERPL-MCNC: 10 UG/DL — SIGNIFICANT CHANGE UP (ref 6–18.4)
CORTIS AM PEAK SERPL-MCNC: 10.1 UG/DL — SIGNIFICANT CHANGE UP (ref 6–18.4)
CORTIS AM PEAK SERPL-MCNC: 13.1 UG/DL — SIGNIFICANT CHANGE UP (ref 6–18.4)
CORTIS AM PEAK SERPL-MCNC: 13.8 UG/DL — SIGNIFICANT CHANGE UP (ref 6–18.4)
CORTIS AM PEAK SERPL-MCNC: 16.2 UG/DL — SIGNIFICANT CHANGE UP (ref 6–18.4)
CORTIS AM PEAK SERPL-MCNC: 24.3 UG/DL — HIGH (ref 6–18.4)
CORTIS F/TOTAL MFR SERPL: 20 % — SIGNIFICANT CHANGE UP
CORTIS F/TOTAL MFR SERPL: 45 % — SIGNIFICANT CHANGE UP
CORTIS PRE/P CHAL SERPL-SCNC: SIGNIFICANT CHANGE UP
CORTIS PRE/P CHAL SERPL-SCNC: SIGNIFICANT CHANGE UP
CORTIS SERPL-MCNC: 18 UG/DL — SIGNIFICANT CHANGE UP
CORTIS SERPL-MCNC: 8.5 UG/DL — SIGNIFICANT CHANGE UP
CORTIS SP2 SERPL-MCNC: 11.2 UG/DL — SIGNIFICANT CHANGE UP
CORTIS SP2 SERPL-MCNC: 16.7 UG/DL — SIGNIFICANT CHANGE UP
CORTISOL, FREE RESULT: 1.7 UG/DL — SIGNIFICANT CHANGE UP
CORTISOL, FREE RESULT: 8.1 UG/DL — HIGH
CREAT ?TM UR-MCNC: 16 MG/DL — SIGNIFICANT CHANGE UP
CREAT ?TM UR-MCNC: 16 MG/DL — SIGNIFICANT CHANGE UP
CREAT ?TM UR-MCNC: 17 MG/DL — SIGNIFICANT CHANGE UP
CREAT ?TM UR-MCNC: 17 MG/DL — SIGNIFICANT CHANGE UP
CREAT ?TM UR-MCNC: 19 MG/DL — SIGNIFICANT CHANGE UP
CREAT ?TM UR-MCNC: 22 MG/DL — SIGNIFICANT CHANGE UP
CREAT ?TM UR-MCNC: 29 MG/DL — SIGNIFICANT CHANGE UP
CREAT ?TM UR-MCNC: 35 MG/DL — SIGNIFICANT CHANGE UP
CREAT ?TM UR-MCNC: 39 MG/DL — SIGNIFICANT CHANGE UP
CREAT ?TM UR-MCNC: 48 MG/DL — SIGNIFICANT CHANGE UP
CREAT ?TM UR-MCNC: 58 MG/DL — SIGNIFICANT CHANGE UP
CREAT SERPL-MCNC: 3.2 MG/DL — HIGH (ref 0.7–1.5)
CREAT SERPL-MCNC: 3.3 MG/DL — HIGH (ref 0.7–1.5)
CREAT SERPL-MCNC: 3.4 MG/DL — HIGH (ref 0.7–1.5)
CREAT SERPL-MCNC: 3.5 MG/DL — HIGH (ref 0.7–1.5)
CREAT SERPL-MCNC: 3.6 MG/DL — HIGH (ref 0.7–1.5)
CREAT SERPL-MCNC: 3.7 MG/DL — HIGH (ref 0.7–1.5)
CREAT SERPL-MCNC: 3.7 MG/DL — HIGH (ref 0.7–1.5)
CREAT SERPL-MCNC: 3.8 MG/DL — HIGH (ref 0.7–1.5)
CREAT SERPL-MCNC: 3.9 MG/DL — HIGH (ref 0.7–1.5)
CREAT SERPL-MCNC: 4 MG/DL — HIGH (ref 0.7–1.5)
CREAT SERPL-MCNC: 4.1 MG/DL — CRITICAL HIGH (ref 0.7–1.5)
CREAT SERPL-MCNC: 4.2 MG/DL — CRITICAL HIGH (ref 0.7–1.5)
CREAT SERPL-MCNC: 4.3 MG/DL — CRITICAL HIGH (ref 0.7–1.5)
CREAT SERPL-MCNC: 4.4 MG/DL — CRITICAL HIGH (ref 0.7–1.5)
CREAT SERPL-MCNC: 4.5 MG/DL — CRITICAL HIGH (ref 0.7–1.5)
CREAT SERPL-MCNC: 4.5 MG/DL — CRITICAL HIGH (ref 0.7–1.5)
CREAT SERPL-MCNC: 4.6 MG/DL — CRITICAL HIGH (ref 0.7–1.5)
CREAT SERPL-MCNC: 4.7 MG/DL — CRITICAL HIGH (ref 0.7–1.5)
CREAT SERPL-MCNC: 4.8 MG/DL — CRITICAL HIGH (ref 0.7–1.5)
CREAT SERPL-MCNC: 4.9 MG/DL — CRITICAL HIGH (ref 0.7–1.5)
CREAT SERPL-MCNC: 5 MG/DL — CRITICAL HIGH (ref 0.7–1.5)
CREAT SERPL-MCNC: 5.1 MG/DL — CRITICAL HIGH (ref 0.7–1.5)
CREAT SERPL-MCNC: 5.5 MG/DL — CRITICAL HIGH (ref 0.7–1.5)
CREAT SERPL-MCNC: 5.7 MG/DL — CRITICAL HIGH (ref 0.7–1.5)
CRP SERPL-MCNC: 11.25 MG/DL — HIGH (ref 0–0.4)
CULTURE RESULTS: SIGNIFICANT CHANGE UP
DEPRECATED KAPPA LC FREE/LAMBDA SER: 1.96 — LOW (ref 2.04–10.37)
DEPRECATED KAPPA LC FREE/LAMBDA SER: 2.13 — SIGNIFICANT CHANGE UP (ref 2.04–10.37)
DIFF PNL FLD: ABNORMAL
DIFF PNL FLD: SIGNIFICANT CHANGE UP
DRUG SCREEN 1, URINE RESULT: SIGNIFICANT CHANGE UP
EDDP UR CFM-MCNC: SIGNIFICANT CHANGE UP NG/MG CREAT
EOSINOPHIL # BLD AUTO: 0 K/UL — SIGNIFICANT CHANGE UP (ref 0–0.7)
EOSINOPHIL # BLD AUTO: 0.03 K/UL — SIGNIFICANT CHANGE UP (ref 0–0.7)
EOSINOPHIL # BLD AUTO: 0.03 K/UL — SIGNIFICANT CHANGE UP (ref 0–0.7)
EOSINOPHIL # BLD AUTO: 0.07 K/UL — SIGNIFICANT CHANGE UP (ref 0–0.7)
EOSINOPHIL # BLD AUTO: 0.09 K/UL — SIGNIFICANT CHANGE UP (ref 0–0.7)
EOSINOPHIL # BLD AUTO: 0.13 K/UL — SIGNIFICANT CHANGE UP (ref 0–0.7)
EOSINOPHIL # BLD AUTO: 0.17 K/UL — SIGNIFICANT CHANGE UP (ref 0–0.7)
EOSINOPHIL # BLD AUTO: 0.17 K/UL — SIGNIFICANT CHANGE UP (ref 0–0.7)
EOSINOPHIL # BLD AUTO: 0.19 K/UL — SIGNIFICANT CHANGE UP (ref 0–0.7)
EOSINOPHIL # BLD AUTO: 0.2 K/UL — SIGNIFICANT CHANGE UP (ref 0–0.7)
EOSINOPHIL # BLD AUTO: 0.22 K/UL — SIGNIFICANT CHANGE UP (ref 0–0.7)
EOSINOPHIL # BLD AUTO: 0.24 K/UL — SIGNIFICANT CHANGE UP (ref 0–0.7)
EOSINOPHIL # BLD AUTO: 0.25 K/UL — SIGNIFICANT CHANGE UP (ref 0–0.7)
EOSINOPHIL # BLD AUTO: 0.26 K/UL — SIGNIFICANT CHANGE UP (ref 0–0.7)
EOSINOPHIL # BLD AUTO: 0.27 K/UL — SIGNIFICANT CHANGE UP (ref 0–0.7)
EOSINOPHIL # BLD AUTO: 0.28 K/UL — SIGNIFICANT CHANGE UP (ref 0–0.7)
EOSINOPHIL # BLD AUTO: 0.28 K/UL — SIGNIFICANT CHANGE UP (ref 0–0.7)
EOSINOPHIL # BLD AUTO: 0.29 K/UL — SIGNIFICANT CHANGE UP (ref 0–0.7)
EOSINOPHIL # BLD AUTO: 0.3 K/UL — SIGNIFICANT CHANGE UP (ref 0–0.7)
EOSINOPHIL # BLD AUTO: 0.31 K/UL — SIGNIFICANT CHANGE UP (ref 0–0.7)
EOSINOPHIL # BLD AUTO: 0.31 K/UL — SIGNIFICANT CHANGE UP (ref 0–0.7)
EOSINOPHIL # BLD AUTO: 0.32 K/UL — SIGNIFICANT CHANGE UP (ref 0–0.7)
EOSINOPHIL # BLD AUTO: 0.33 K/UL — SIGNIFICANT CHANGE UP (ref 0–0.7)
EOSINOPHIL # BLD AUTO: 0.34 K/UL — SIGNIFICANT CHANGE UP (ref 0–0.7)
EOSINOPHIL # BLD AUTO: 0.35 K/UL — SIGNIFICANT CHANGE UP (ref 0–0.7)
EOSINOPHIL # BLD AUTO: 0.36 K/UL — SIGNIFICANT CHANGE UP (ref 0–0.7)
EOSINOPHIL # BLD AUTO: 0.37 K/UL — SIGNIFICANT CHANGE UP (ref 0–0.7)
EOSINOPHIL # BLD AUTO: 0.38 K/UL — SIGNIFICANT CHANGE UP (ref 0–0.7)
EOSINOPHIL # BLD AUTO: 0.38 K/UL — SIGNIFICANT CHANGE UP (ref 0–0.7)
EOSINOPHIL # BLD AUTO: 0.39 K/UL — SIGNIFICANT CHANGE UP (ref 0–0.7)
EOSINOPHIL # BLD AUTO: 0.4 K/UL — SIGNIFICANT CHANGE UP (ref 0–0.7)
EOSINOPHIL # BLD AUTO: 0.41 K/UL — SIGNIFICANT CHANGE UP (ref 0–0.7)
EOSINOPHIL # BLD AUTO: 0.42 K/UL — SIGNIFICANT CHANGE UP (ref 0–0.7)
EOSINOPHIL # BLD AUTO: 0.43 K/UL — SIGNIFICANT CHANGE UP (ref 0–0.7)
EOSINOPHIL # BLD AUTO: 0.44 K/UL — SIGNIFICANT CHANGE UP (ref 0–0.7)
EOSINOPHIL # BLD AUTO: 0.47 K/UL — SIGNIFICANT CHANGE UP (ref 0–0.7)
EOSINOPHIL # BLD AUTO: 0.47 K/UL — SIGNIFICANT CHANGE UP (ref 0–0.7)
EOSINOPHIL # BLD AUTO: 0.49 K/UL — SIGNIFICANT CHANGE UP (ref 0–0.7)
EOSINOPHIL # BLD AUTO: 0.49 K/UL — SIGNIFICANT CHANGE UP (ref 0–0.7)
EOSINOPHIL # BLD AUTO: 0.51 K/UL — SIGNIFICANT CHANGE UP (ref 0–0.7)
EOSINOPHIL # BLD AUTO: 0.51 K/UL — SIGNIFICANT CHANGE UP (ref 0–0.7)
EOSINOPHIL # BLD AUTO: 0.53 K/UL — SIGNIFICANT CHANGE UP (ref 0–0.7)
EOSINOPHIL # BLD AUTO: 0.54 K/UL — SIGNIFICANT CHANGE UP (ref 0–0.7)
EOSINOPHIL # BLD AUTO: 0.58 K/UL — SIGNIFICANT CHANGE UP (ref 0–0.7)
EOSINOPHIL # BLD AUTO: 0.61 K/UL — SIGNIFICANT CHANGE UP (ref 0–0.7)
EOSINOPHIL # BLD AUTO: 0.63 K/UL — SIGNIFICANT CHANGE UP (ref 0–0.7)
EOSINOPHIL # BLD AUTO: 0.65 K/UL — SIGNIFICANT CHANGE UP (ref 0–0.7)
EOSINOPHIL NFR BLD AUTO: 0 % — SIGNIFICANT CHANGE UP (ref 0–8)
EOSINOPHIL NFR BLD AUTO: 0.3 % — SIGNIFICANT CHANGE UP (ref 0–8)
EOSINOPHIL NFR BLD AUTO: 0.3 % — SIGNIFICANT CHANGE UP (ref 0–8)
EOSINOPHIL NFR BLD AUTO: 0.5 % — SIGNIFICANT CHANGE UP (ref 0–8)
EOSINOPHIL NFR BLD AUTO: 0.8 % — SIGNIFICANT CHANGE UP (ref 0–8)
EOSINOPHIL NFR BLD AUTO: 1.1 % — SIGNIFICANT CHANGE UP (ref 0–8)
EOSINOPHIL NFR BLD AUTO: 1.5 % — SIGNIFICANT CHANGE UP (ref 0–8)
EOSINOPHIL NFR BLD AUTO: 1.6 % — SIGNIFICANT CHANGE UP (ref 0–8)
EOSINOPHIL NFR BLD AUTO: 1.6 % — SIGNIFICANT CHANGE UP (ref 0–8)
EOSINOPHIL NFR BLD AUTO: 1.7 % — SIGNIFICANT CHANGE UP (ref 0–8)
EOSINOPHIL NFR BLD AUTO: 1.8 % — SIGNIFICANT CHANGE UP (ref 0–8)
EOSINOPHIL NFR BLD AUTO: 2 % — SIGNIFICANT CHANGE UP (ref 0–8)
EOSINOPHIL NFR BLD AUTO: 2.4 % — SIGNIFICANT CHANGE UP (ref 0–8)
EOSINOPHIL NFR BLD AUTO: 2.4 % — SIGNIFICANT CHANGE UP (ref 0–8)
EOSINOPHIL NFR BLD AUTO: 2.6 % — SIGNIFICANT CHANGE UP (ref 0–8)
EOSINOPHIL NFR BLD AUTO: 2.8 % — SIGNIFICANT CHANGE UP (ref 0–8)
EOSINOPHIL NFR BLD AUTO: 2.9 % — SIGNIFICANT CHANGE UP (ref 0–8)
EOSINOPHIL NFR BLD AUTO: 3.1 % — SIGNIFICANT CHANGE UP (ref 0–8)
EOSINOPHIL NFR BLD AUTO: 3.2 % — SIGNIFICANT CHANGE UP (ref 0–8)
EOSINOPHIL NFR BLD AUTO: 3.4 % — SIGNIFICANT CHANGE UP (ref 0–8)
EOSINOPHIL NFR BLD AUTO: 3.5 % — SIGNIFICANT CHANGE UP (ref 0–8)
EOSINOPHIL NFR BLD AUTO: 3.5 % — SIGNIFICANT CHANGE UP (ref 0–8)
EOSINOPHIL NFR BLD AUTO: 3.6 % — SIGNIFICANT CHANGE UP (ref 0–8)
EOSINOPHIL NFR BLD AUTO: 3.7 % — SIGNIFICANT CHANGE UP (ref 0–8)
EOSINOPHIL NFR BLD AUTO: 3.7 % — SIGNIFICANT CHANGE UP (ref 0–8)
EOSINOPHIL NFR BLD AUTO: 3.8 % — SIGNIFICANT CHANGE UP (ref 0–8)
EOSINOPHIL NFR BLD AUTO: 4.1 % — SIGNIFICANT CHANGE UP (ref 0–8)
EOSINOPHIL NFR BLD AUTO: 4.2 % — SIGNIFICANT CHANGE UP (ref 0–8)
EOSINOPHIL NFR BLD AUTO: 4.3 % — SIGNIFICANT CHANGE UP (ref 0–8)
EOSINOPHIL NFR BLD AUTO: 4.4 % — SIGNIFICANT CHANGE UP (ref 0–8)
EOSINOPHIL NFR BLD AUTO: 4.4 % — SIGNIFICANT CHANGE UP (ref 0–8)
EOSINOPHIL NFR BLD AUTO: 4.5 % — SIGNIFICANT CHANGE UP (ref 0–8)
EOSINOPHIL NFR BLD AUTO: 4.6 % — SIGNIFICANT CHANGE UP (ref 0–8)
EOSINOPHIL NFR BLD AUTO: 4.6 % — SIGNIFICANT CHANGE UP (ref 0–8)
EOSINOPHIL NFR BLD AUTO: 4.7 % — SIGNIFICANT CHANGE UP (ref 0–8)
EOSINOPHIL NFR BLD AUTO: 4.8 % — SIGNIFICANT CHANGE UP (ref 0–8)
EOSINOPHIL NFR BLD AUTO: 4.8 % — SIGNIFICANT CHANGE UP (ref 0–8)
EOSINOPHIL NFR BLD AUTO: 4.9 % — SIGNIFICANT CHANGE UP (ref 0–8)
EOSINOPHIL NFR BLD AUTO: 5.2 % — SIGNIFICANT CHANGE UP (ref 0–8)
EOSINOPHIL NFR BLD AUTO: 5.3 % — SIGNIFICANT CHANGE UP (ref 0–8)
EOSINOPHIL NFR BLD AUTO: 5.3 % — SIGNIFICANT CHANGE UP (ref 0–8)
EOSINOPHIL NFR BLD AUTO: 5.9 % — SIGNIFICANT CHANGE UP (ref 0–8)
EOSINOPHIL NFR BLD AUTO: 6.1 % — SIGNIFICANT CHANGE UP (ref 0–8)
EOSINOPHIL NFR BLD AUTO: 6.3 % — SIGNIFICANT CHANGE UP (ref 0–8)
EOSINOPHIL NFR BLD AUTO: 7 % — SIGNIFICANT CHANGE UP (ref 0–8)
EOSINOPHIL NFR BLD AUTO: 7.2 % — SIGNIFICANT CHANGE UP (ref 0–8)
EPI CELLS # UR: 1 /HPF — SIGNIFICANT CHANGE UP (ref 0–5)
EPI CELLS # UR: ABNORMAL /HPF
ERYTHROCYTE [SEDIMENTATION RATE] IN BLOOD: 110 MM/HR — HIGH (ref 0–10)
ESTIMATED AVERAGE GLUCOSE: 111 MG/DL — SIGNIFICANT CHANGE UP (ref 68–114)
ESTIMATED AVERAGE GLUCOSE: 128 MG/DL — HIGH (ref 68–114)
ETHANOL SERPL-MCNC: <10 MG/DL — SIGNIFICANT CHANGE UP
ETHANOL SERPL-MCNC: <10 MG/DL — SIGNIFICANT CHANGE UP
FERRITIN SERPL-MCNC: 679 NG/ML — HIGH (ref 30–400)
FOLATE SERPL-MCNC: 6.2 NG/ML — SIGNIFICANT CHANGE UP
FOLATE SERPL-MCNC: 6.8 NG/ML — SIGNIFICANT CHANGE UP
GAS PNL BLDA: SIGNIFICANT CHANGE UP
GAS PNL BLDV: 131 MMOL/L — LOW (ref 136–145)
GAS PNL BLDV: 131 MMOL/L — LOW (ref 136–145)
GAS PNL BLDV: 134 MMOL/L — LOW (ref 136–145)
GAS PNL BLDV: 143 MMOL/L — SIGNIFICANT CHANGE UP (ref 136–145)
GAS PNL BLDV: SIGNIFICANT CHANGE UP
GH SERPL-MCNC: 5.3 NG/ML — HIGH (ref 0.03–2.47)
GIANT PLATELETS BLD QL SMEAR: PRESENT — SIGNIFICANT CHANGE UP
GIANT PLATELETS BLD QL SMEAR: PRESENT — SIGNIFICANT CHANGE UP
GLUCOSE BLDC GLUCOMTR-MCNC: 100 MG/DL — HIGH (ref 70–99)
GLUCOSE BLDC GLUCOMTR-MCNC: 101 MG/DL — HIGH (ref 70–99)
GLUCOSE BLDC GLUCOMTR-MCNC: 102 MG/DL — HIGH (ref 70–99)
GLUCOSE BLDC GLUCOMTR-MCNC: 103 MG/DL — HIGH (ref 70–99)
GLUCOSE BLDC GLUCOMTR-MCNC: 104 MG/DL — HIGH (ref 70–99)
GLUCOSE BLDC GLUCOMTR-MCNC: 104 MG/DL — HIGH (ref 70–99)
GLUCOSE BLDC GLUCOMTR-MCNC: 105 MG/DL — HIGH (ref 70–99)
GLUCOSE BLDC GLUCOMTR-MCNC: 106 MG/DL — HIGH (ref 70–99)
GLUCOSE BLDC GLUCOMTR-MCNC: 107 MG/DL — HIGH (ref 70–99)
GLUCOSE BLDC GLUCOMTR-MCNC: 108 MG/DL — HIGH (ref 70–99)
GLUCOSE BLDC GLUCOMTR-MCNC: 109 MG/DL — HIGH (ref 70–99)
GLUCOSE BLDC GLUCOMTR-MCNC: 110 MG/DL — HIGH (ref 70–99)
GLUCOSE BLDC GLUCOMTR-MCNC: 110 MG/DL — HIGH (ref 70–99)
GLUCOSE BLDC GLUCOMTR-MCNC: 111 MG/DL — HIGH (ref 70–99)
GLUCOSE BLDC GLUCOMTR-MCNC: 112 MG/DL — HIGH (ref 70–99)
GLUCOSE BLDC GLUCOMTR-MCNC: 113 MG/DL — HIGH (ref 70–99)
GLUCOSE BLDC GLUCOMTR-MCNC: 114 MG/DL — HIGH (ref 70–99)
GLUCOSE BLDC GLUCOMTR-MCNC: 115 MG/DL — HIGH (ref 70–99)
GLUCOSE BLDC GLUCOMTR-MCNC: 116 MG/DL — HIGH (ref 70–99)
GLUCOSE BLDC GLUCOMTR-MCNC: 117 MG/DL — HIGH (ref 70–99)
GLUCOSE BLDC GLUCOMTR-MCNC: 118 MG/DL — HIGH (ref 70–99)
GLUCOSE BLDC GLUCOMTR-MCNC: 119 MG/DL — HIGH (ref 70–99)
GLUCOSE BLDC GLUCOMTR-MCNC: 120 MG/DL — HIGH (ref 70–99)
GLUCOSE BLDC GLUCOMTR-MCNC: 120 MG/DL — HIGH (ref 70–99)
GLUCOSE BLDC GLUCOMTR-MCNC: 122 MG/DL — HIGH (ref 70–99)
GLUCOSE BLDC GLUCOMTR-MCNC: 123 MG/DL — HIGH (ref 70–99)
GLUCOSE BLDC GLUCOMTR-MCNC: 124 MG/DL — HIGH (ref 70–99)
GLUCOSE BLDC GLUCOMTR-MCNC: 125 MG/DL — HIGH (ref 70–99)
GLUCOSE BLDC GLUCOMTR-MCNC: 126 MG/DL — HIGH (ref 70–99)
GLUCOSE BLDC GLUCOMTR-MCNC: 126 MG/DL — HIGH (ref 70–99)
GLUCOSE BLDC GLUCOMTR-MCNC: 127 MG/DL — HIGH (ref 70–99)
GLUCOSE BLDC GLUCOMTR-MCNC: 128 MG/DL — HIGH (ref 70–99)
GLUCOSE BLDC GLUCOMTR-MCNC: 129 MG/DL — HIGH (ref 70–99)
GLUCOSE BLDC GLUCOMTR-MCNC: 130 MG/DL — HIGH (ref 70–99)
GLUCOSE BLDC GLUCOMTR-MCNC: 131 MG/DL — HIGH (ref 70–99)
GLUCOSE BLDC GLUCOMTR-MCNC: 132 MG/DL — HIGH (ref 70–99)
GLUCOSE BLDC GLUCOMTR-MCNC: 132 MG/DL — HIGH (ref 70–99)
GLUCOSE BLDC GLUCOMTR-MCNC: 134 MG/DL — HIGH (ref 70–99)
GLUCOSE BLDC GLUCOMTR-MCNC: 135 MG/DL — HIGH (ref 70–99)
GLUCOSE BLDC GLUCOMTR-MCNC: 136 MG/DL — HIGH (ref 70–99)
GLUCOSE BLDC GLUCOMTR-MCNC: 136 MG/DL — HIGH (ref 70–99)
GLUCOSE BLDC GLUCOMTR-MCNC: 137 MG/DL — HIGH (ref 70–99)
GLUCOSE BLDC GLUCOMTR-MCNC: 138 MG/DL — HIGH (ref 70–99)
GLUCOSE BLDC GLUCOMTR-MCNC: 139 MG/DL — HIGH (ref 70–99)
GLUCOSE BLDC GLUCOMTR-MCNC: 139 MG/DL — HIGH (ref 70–99)
GLUCOSE BLDC GLUCOMTR-MCNC: 141 MG/DL — HIGH (ref 70–99)
GLUCOSE BLDC GLUCOMTR-MCNC: 142 MG/DL — HIGH (ref 70–99)
GLUCOSE BLDC GLUCOMTR-MCNC: 143 MG/DL — HIGH (ref 70–99)
GLUCOSE BLDC GLUCOMTR-MCNC: 145 MG/DL — HIGH (ref 70–99)
GLUCOSE BLDC GLUCOMTR-MCNC: 146 MG/DL — HIGH (ref 70–99)
GLUCOSE BLDC GLUCOMTR-MCNC: 146 MG/DL — HIGH (ref 70–99)
GLUCOSE BLDC GLUCOMTR-MCNC: 148 MG/DL — HIGH (ref 70–99)
GLUCOSE BLDC GLUCOMTR-MCNC: 148 MG/DL — HIGH (ref 70–99)
GLUCOSE BLDC GLUCOMTR-MCNC: 149 MG/DL — HIGH (ref 70–99)
GLUCOSE BLDC GLUCOMTR-MCNC: 149 MG/DL — HIGH (ref 70–99)
GLUCOSE BLDC GLUCOMTR-MCNC: 151 MG/DL — HIGH (ref 70–99)
GLUCOSE BLDC GLUCOMTR-MCNC: 152 MG/DL — HIGH (ref 70–99)
GLUCOSE BLDC GLUCOMTR-MCNC: 152 MG/DL — HIGH (ref 70–99)
GLUCOSE BLDC GLUCOMTR-MCNC: 155 MG/DL — HIGH (ref 70–99)
GLUCOSE BLDC GLUCOMTR-MCNC: 156 MG/DL — HIGH (ref 70–99)
GLUCOSE BLDC GLUCOMTR-MCNC: 159 MG/DL — HIGH (ref 70–99)
GLUCOSE BLDC GLUCOMTR-MCNC: 161 MG/DL — HIGH (ref 70–99)
GLUCOSE BLDC GLUCOMTR-MCNC: 165 MG/DL — HIGH (ref 70–99)
GLUCOSE BLDC GLUCOMTR-MCNC: 167 MG/DL — HIGH (ref 70–99)
GLUCOSE BLDC GLUCOMTR-MCNC: 168 MG/DL — HIGH (ref 70–99)
GLUCOSE BLDC GLUCOMTR-MCNC: 168 MG/DL — HIGH (ref 70–99)
GLUCOSE BLDC GLUCOMTR-MCNC: 171 MG/DL — HIGH (ref 70–99)
GLUCOSE BLDC GLUCOMTR-MCNC: 171 MG/DL — HIGH (ref 70–99)
GLUCOSE BLDC GLUCOMTR-MCNC: 172 MG/DL — HIGH (ref 70–99)
GLUCOSE BLDC GLUCOMTR-MCNC: 172 MG/DL — HIGH (ref 70–99)
GLUCOSE BLDC GLUCOMTR-MCNC: 190 MG/DL — HIGH (ref 70–99)
GLUCOSE BLDC GLUCOMTR-MCNC: 190 MG/DL — HIGH (ref 70–99)
GLUCOSE BLDC GLUCOMTR-MCNC: 197 MG/DL — HIGH (ref 70–99)
GLUCOSE BLDC GLUCOMTR-MCNC: 210 MG/DL — HIGH (ref 70–99)
GLUCOSE BLDC GLUCOMTR-MCNC: 213 MG/DL — HIGH (ref 70–99)
GLUCOSE BLDC GLUCOMTR-MCNC: 213 MG/DL — HIGH (ref 70–99)
GLUCOSE BLDC GLUCOMTR-MCNC: 217 MG/DL — HIGH (ref 70–99)
GLUCOSE BLDC GLUCOMTR-MCNC: 237 MG/DL — HIGH (ref 70–99)
GLUCOSE BLDC GLUCOMTR-MCNC: 242 MG/DL — HIGH (ref 70–99)
GLUCOSE BLDC GLUCOMTR-MCNC: 26 MG/DL — CRITICAL LOW (ref 70–99)
GLUCOSE BLDC GLUCOMTR-MCNC: 264 MG/DL — HIGH (ref 70–99)
GLUCOSE BLDC GLUCOMTR-MCNC: 264 MG/DL — HIGH (ref 70–99)
GLUCOSE BLDC GLUCOMTR-MCNC: 27 MG/DL — CRITICAL LOW (ref 70–99)
GLUCOSE BLDC GLUCOMTR-MCNC: 285 MG/DL — HIGH (ref 70–99)
GLUCOSE BLDC GLUCOMTR-MCNC: 288 MG/DL — HIGH (ref 70–99)
GLUCOSE BLDC GLUCOMTR-MCNC: 31 MG/DL — CRITICAL LOW (ref 70–99)
GLUCOSE BLDC GLUCOMTR-MCNC: 33 MG/DL — CRITICAL LOW (ref 70–99)
GLUCOSE BLDC GLUCOMTR-MCNC: 34 MG/DL — CRITICAL LOW (ref 70–99)
GLUCOSE BLDC GLUCOMTR-MCNC: 38 MG/DL — CRITICAL LOW (ref 70–99)
GLUCOSE BLDC GLUCOMTR-MCNC: 45 MG/DL — CRITICAL LOW (ref 70–99)
GLUCOSE BLDC GLUCOMTR-MCNC: 47 MG/DL — LOW (ref 70–99)
GLUCOSE BLDC GLUCOMTR-MCNC: 49 MG/DL — LOW (ref 70–99)
GLUCOSE BLDC GLUCOMTR-MCNC: 512 MG/DL — CRITICAL HIGH (ref 70–99)
GLUCOSE BLDC GLUCOMTR-MCNC: 53 MG/DL — LOW (ref 70–99)
GLUCOSE BLDC GLUCOMTR-MCNC: 53 MG/DL — LOW (ref 70–99)
GLUCOSE BLDC GLUCOMTR-MCNC: 54 MG/DL — LOW (ref 70–99)
GLUCOSE BLDC GLUCOMTR-MCNC: 55 MG/DL — LOW (ref 70–99)
GLUCOSE BLDC GLUCOMTR-MCNC: 56 MG/DL — LOW (ref 70–99)
GLUCOSE BLDC GLUCOMTR-MCNC: 58 MG/DL — LOW (ref 70–99)
GLUCOSE BLDC GLUCOMTR-MCNC: 58 MG/DL — LOW (ref 70–99)
GLUCOSE BLDC GLUCOMTR-MCNC: 59 MG/DL — LOW (ref 70–99)
GLUCOSE BLDC GLUCOMTR-MCNC: 59 MG/DL — LOW (ref 70–99)
GLUCOSE BLDC GLUCOMTR-MCNC: 60 MG/DL — LOW (ref 70–99)
GLUCOSE BLDC GLUCOMTR-MCNC: 61 MG/DL — LOW (ref 70–99)
GLUCOSE BLDC GLUCOMTR-MCNC: 61 MG/DL — LOW (ref 70–99)
GLUCOSE BLDC GLUCOMTR-MCNC: 62 MG/DL — LOW (ref 70–99)
GLUCOSE BLDC GLUCOMTR-MCNC: 62 MG/DL — LOW (ref 70–99)
GLUCOSE BLDC GLUCOMTR-MCNC: 63 MG/DL — LOW (ref 70–99)
GLUCOSE BLDC GLUCOMTR-MCNC: 65 MG/DL — LOW (ref 70–99)
GLUCOSE BLDC GLUCOMTR-MCNC: 66 MG/DL — LOW (ref 70–99)
GLUCOSE BLDC GLUCOMTR-MCNC: 67 MG/DL — LOW (ref 70–99)
GLUCOSE BLDC GLUCOMTR-MCNC: 68 MG/DL — LOW (ref 70–99)
GLUCOSE BLDC GLUCOMTR-MCNC: 69 MG/DL — LOW (ref 70–99)
GLUCOSE BLDC GLUCOMTR-MCNC: 70 MG/DL — SIGNIFICANT CHANGE UP (ref 70–99)
GLUCOSE BLDC GLUCOMTR-MCNC: 71 MG/DL — SIGNIFICANT CHANGE UP (ref 70–99)
GLUCOSE BLDC GLUCOMTR-MCNC: 72 MG/DL — SIGNIFICANT CHANGE UP (ref 70–99)
GLUCOSE BLDC GLUCOMTR-MCNC: 73 MG/DL — SIGNIFICANT CHANGE UP (ref 70–99)
GLUCOSE BLDC GLUCOMTR-MCNC: 74 MG/DL — SIGNIFICANT CHANGE UP (ref 70–99)
GLUCOSE BLDC GLUCOMTR-MCNC: 75 MG/DL — SIGNIFICANT CHANGE UP (ref 70–99)
GLUCOSE BLDC GLUCOMTR-MCNC: 76 MG/DL — SIGNIFICANT CHANGE UP (ref 70–99)
GLUCOSE BLDC GLUCOMTR-MCNC: 77 MG/DL — SIGNIFICANT CHANGE UP (ref 70–99)
GLUCOSE BLDC GLUCOMTR-MCNC: 78 MG/DL — SIGNIFICANT CHANGE UP (ref 70–99)
GLUCOSE BLDC GLUCOMTR-MCNC: 79 MG/DL — SIGNIFICANT CHANGE UP (ref 70–99)
GLUCOSE BLDC GLUCOMTR-MCNC: 80 MG/DL — SIGNIFICANT CHANGE UP (ref 70–99)
GLUCOSE BLDC GLUCOMTR-MCNC: 81 MG/DL — SIGNIFICANT CHANGE UP (ref 70–99)
GLUCOSE BLDC GLUCOMTR-MCNC: 82 MG/DL — SIGNIFICANT CHANGE UP (ref 70–99)
GLUCOSE BLDC GLUCOMTR-MCNC: 83 MG/DL — SIGNIFICANT CHANGE UP (ref 70–99)
GLUCOSE BLDC GLUCOMTR-MCNC: 84 MG/DL — SIGNIFICANT CHANGE UP (ref 70–99)
GLUCOSE BLDC GLUCOMTR-MCNC: 84 MG/DL — SIGNIFICANT CHANGE UP (ref 70–99)
GLUCOSE BLDC GLUCOMTR-MCNC: 85 MG/DL — SIGNIFICANT CHANGE UP (ref 70–99)
GLUCOSE BLDC GLUCOMTR-MCNC: 86 MG/DL — SIGNIFICANT CHANGE UP (ref 70–99)
GLUCOSE BLDC GLUCOMTR-MCNC: 87 MG/DL — SIGNIFICANT CHANGE UP (ref 70–99)
GLUCOSE BLDC GLUCOMTR-MCNC: 88 MG/DL — SIGNIFICANT CHANGE UP (ref 70–99)
GLUCOSE BLDC GLUCOMTR-MCNC: 89 MG/DL — SIGNIFICANT CHANGE UP (ref 70–99)
GLUCOSE BLDC GLUCOMTR-MCNC: 90 MG/DL — SIGNIFICANT CHANGE UP (ref 70–99)
GLUCOSE BLDC GLUCOMTR-MCNC: 91 MG/DL — SIGNIFICANT CHANGE UP (ref 70–99)
GLUCOSE BLDC GLUCOMTR-MCNC: 92 MG/DL — SIGNIFICANT CHANGE UP (ref 70–99)
GLUCOSE BLDC GLUCOMTR-MCNC: 93 MG/DL — SIGNIFICANT CHANGE UP (ref 70–99)
GLUCOSE BLDC GLUCOMTR-MCNC: 94 MG/DL — SIGNIFICANT CHANGE UP (ref 70–99)
GLUCOSE BLDC GLUCOMTR-MCNC: 95 MG/DL — SIGNIFICANT CHANGE UP (ref 70–99)
GLUCOSE BLDC GLUCOMTR-MCNC: 96 MG/DL — SIGNIFICANT CHANGE UP (ref 70–99)
GLUCOSE BLDC GLUCOMTR-MCNC: 97 MG/DL — SIGNIFICANT CHANGE UP (ref 70–99)
GLUCOSE BLDC GLUCOMTR-MCNC: 98 MG/DL — SIGNIFICANT CHANGE UP (ref 70–99)
GLUCOSE BLDC GLUCOMTR-MCNC: 98 MG/DL — SIGNIFICANT CHANGE UP (ref 70–99)
GLUCOSE BLDC GLUCOMTR-MCNC: 99 MG/DL — SIGNIFICANT CHANGE UP (ref 70–99)
GLUCOSE SERPL-MCNC: 100 MG/DL — HIGH (ref 70–99)
GLUCOSE SERPL-MCNC: 101 MG/DL — HIGH (ref 70–99)
GLUCOSE SERPL-MCNC: 101 MG/DL — HIGH (ref 70–99)
GLUCOSE SERPL-MCNC: 103 MG/DL — HIGH (ref 70–99)
GLUCOSE SERPL-MCNC: 105 MG/DL — HIGH (ref 70–99)
GLUCOSE SERPL-MCNC: 105 MG/DL — HIGH (ref 70–99)
GLUCOSE SERPL-MCNC: 106 MG/DL — HIGH (ref 70–99)
GLUCOSE SERPL-MCNC: 111 MG/DL — HIGH (ref 70–99)
GLUCOSE SERPL-MCNC: 114 MG/DL — HIGH (ref 70–99)
GLUCOSE SERPL-MCNC: 116 MG/DL — HIGH (ref 70–99)
GLUCOSE SERPL-MCNC: 116 MG/DL — HIGH (ref 70–99)
GLUCOSE SERPL-MCNC: 122 MG/DL — HIGH (ref 70–99)
GLUCOSE SERPL-MCNC: 136 MG/DL — HIGH (ref 70–99)
GLUCOSE SERPL-MCNC: 139 MG/DL — HIGH (ref 70–99)
GLUCOSE SERPL-MCNC: 143 MG/DL — HIGH (ref 70–99)
GLUCOSE SERPL-MCNC: 23 MG/DL — CRITICAL LOW (ref 70–99)
GLUCOSE SERPL-MCNC: 31 MG/DL — CRITICAL LOW (ref 70–99)
GLUCOSE SERPL-MCNC: 32 MG/DL — CRITICAL LOW (ref 70–99)
GLUCOSE SERPL-MCNC: 39 MG/DL — CRITICAL LOW (ref 70–99)
GLUCOSE SERPL-MCNC: 42 MG/DL — CRITICAL LOW (ref 70–99)
GLUCOSE SERPL-MCNC: 52 MG/DL — LOW (ref 70–99)
GLUCOSE SERPL-MCNC: 53 MG/DL — LOW (ref 70–99)
GLUCOSE SERPL-MCNC: 53 MG/DL — LOW (ref 70–99)
GLUCOSE SERPL-MCNC: 54 MG/DL — LOW (ref 70–99)
GLUCOSE SERPL-MCNC: 54 MG/DL — LOW (ref 70–99)
GLUCOSE SERPL-MCNC: 55 MG/DL — LOW (ref 70–99)
GLUCOSE SERPL-MCNC: 56 MG/DL — LOW (ref 70–99)
GLUCOSE SERPL-MCNC: 56 MG/DL — LOW (ref 70–99)
GLUCOSE SERPL-MCNC: 57 MG/DL — LOW (ref 70–99)
GLUCOSE SERPL-MCNC: 59 MG/DL — LOW (ref 70–99)
GLUCOSE SERPL-MCNC: 59 MG/DL — LOW (ref 70–99)
GLUCOSE SERPL-MCNC: 60 MG/DL — LOW (ref 70–99)
GLUCOSE SERPL-MCNC: 61 MG/DL — LOW (ref 70–99)
GLUCOSE SERPL-MCNC: 61 MG/DL — LOW (ref 70–99)
GLUCOSE SERPL-MCNC: 62 MG/DL — LOW (ref 70–99)
GLUCOSE SERPL-MCNC: 62 MG/DL — LOW (ref 70–99)
GLUCOSE SERPL-MCNC: 63 MG/DL — LOW (ref 70–99)
GLUCOSE SERPL-MCNC: 63 MG/DL — LOW (ref 70–99)
GLUCOSE SERPL-MCNC: 64 MG/DL — LOW (ref 70–99)
GLUCOSE SERPL-MCNC: 65 MG/DL — LOW (ref 70–99)
GLUCOSE SERPL-MCNC: 65 MG/DL — LOW (ref 70–99)
GLUCOSE SERPL-MCNC: 66 MG/DL — LOW (ref 70–99)
GLUCOSE SERPL-MCNC: 67 MG/DL — LOW (ref 70–99)
GLUCOSE SERPL-MCNC: 68 MG/DL — LOW (ref 70–99)
GLUCOSE SERPL-MCNC: 69 MG/DL — LOW (ref 70–99)
GLUCOSE SERPL-MCNC: 70 MG/DL — SIGNIFICANT CHANGE UP (ref 70–99)
GLUCOSE SERPL-MCNC: 71 MG/DL — SIGNIFICANT CHANGE UP (ref 70–99)
GLUCOSE SERPL-MCNC: 72 MG/DL — SIGNIFICANT CHANGE UP (ref 70–99)
GLUCOSE SERPL-MCNC: 73 MG/DL — SIGNIFICANT CHANGE UP (ref 70–99)
GLUCOSE SERPL-MCNC: 75 MG/DL — SIGNIFICANT CHANGE UP (ref 70–99)
GLUCOSE SERPL-MCNC: 76 MG/DL — SIGNIFICANT CHANGE UP (ref 70–99)
GLUCOSE SERPL-MCNC: 77 MG/DL — SIGNIFICANT CHANGE UP (ref 70–99)
GLUCOSE SERPL-MCNC: 77 MG/DL — SIGNIFICANT CHANGE UP (ref 70–99)
GLUCOSE SERPL-MCNC: 78 MG/DL — SIGNIFICANT CHANGE UP (ref 70–99)
GLUCOSE SERPL-MCNC: 79 MG/DL — SIGNIFICANT CHANGE UP (ref 70–99)
GLUCOSE SERPL-MCNC: 80 MG/DL — SIGNIFICANT CHANGE UP (ref 70–99)
GLUCOSE SERPL-MCNC: 80 MG/DL — SIGNIFICANT CHANGE UP (ref 70–99)
GLUCOSE SERPL-MCNC: 81 MG/DL — SIGNIFICANT CHANGE UP (ref 70–99)
GLUCOSE SERPL-MCNC: 82 MG/DL — SIGNIFICANT CHANGE UP (ref 70–99)
GLUCOSE SERPL-MCNC: 83 MG/DL — SIGNIFICANT CHANGE UP (ref 70–99)
GLUCOSE SERPL-MCNC: 84 MG/DL — SIGNIFICANT CHANGE UP (ref 70–99)
GLUCOSE SERPL-MCNC: 84 MG/DL — SIGNIFICANT CHANGE UP (ref 70–99)
GLUCOSE SERPL-MCNC: 85 MG/DL — SIGNIFICANT CHANGE UP (ref 70–99)
GLUCOSE SERPL-MCNC: 86 MG/DL — SIGNIFICANT CHANGE UP (ref 70–99)
GLUCOSE SERPL-MCNC: 87 MG/DL — SIGNIFICANT CHANGE UP (ref 70–99)
GLUCOSE SERPL-MCNC: 88 MG/DL — SIGNIFICANT CHANGE UP (ref 70–99)
GLUCOSE SERPL-MCNC: 89 MG/DL — SIGNIFICANT CHANGE UP (ref 70–99)
GLUCOSE SERPL-MCNC: 90 MG/DL — SIGNIFICANT CHANGE UP (ref 70–99)
GLUCOSE SERPL-MCNC: 93 MG/DL — SIGNIFICANT CHANGE UP (ref 70–99)
GLUCOSE SERPL-MCNC: 94 MG/DL — SIGNIFICANT CHANGE UP (ref 70–99)
GLUCOSE SERPL-MCNC: 97 MG/DL — SIGNIFICANT CHANGE UP (ref 70–99)
GLUCOSE SERPL-MCNC: 98 MG/DL — SIGNIFICANT CHANGE UP (ref 70–99)
GLUCOSE SERPL-MCNC: 98 MG/DL — SIGNIFICANT CHANGE UP (ref 70–99)
GLUCOSE UR QL: 100 MG/DL
GLUCOSE UR QL: 100 MG/DL
GLUCOSE UR QL: NEGATIVE MG/DL — SIGNIFICANT CHANGE UP
GLUCOSE UR QL: NEGATIVE — SIGNIFICANT CHANGE UP
GLUCOSE UR QL: SIGNIFICANT CHANGE UP
GRAN CASTS # UR COMP ASSIST: ABNORMAL /LPF
HAPTOGLOB SERPL-MCNC: 217 MG/DL — HIGH (ref 34–200)
HAPTOGLOB SERPL-MCNC: 379 MG/DL — HIGH (ref 34–200)
HAV IGM SER-ACNC: SIGNIFICANT CHANGE UP
HBA1C BLD-MCNC: 5.5 % — SIGNIFICANT CHANGE UP (ref 4–5.6)
HBA1C BLD-MCNC: 6.1 % — HIGH (ref 4–5.6)
HBV CORE IGM SER-ACNC: SIGNIFICANT CHANGE UP
HBV SURFACE AG SER-ACNC: SIGNIFICANT CHANGE UP
HCO3 BLDA-SCNC: 18 MMOL/L — LOW (ref 23–27)
HCO3 BLDA-SCNC: 19 MMOL/L — LOW (ref 23–27)
HCO3 BLDV-SCNC: 13 MMOL/L — LOW (ref 22–29)
HCO3 BLDV-SCNC: 15 MMOL/L — LOW (ref 22–29)
HCO3 BLDV-SCNC: 17 MMOL/L — LOW (ref 22–29)
HCO3 BLDV-SCNC: 17 MMOL/L — LOW (ref 22–29)
HCT VFR BLD CALC: 19.9 % — LOW (ref 42–52)
HCT VFR BLD CALC: 20.7 % — LOW (ref 42–52)
HCT VFR BLD CALC: 21.3 % — LOW (ref 42–52)
HCT VFR BLD CALC: 21.6 % — LOW (ref 42–52)
HCT VFR BLD CALC: 21.7 % — LOW (ref 42–52)
HCT VFR BLD CALC: 21.8 % — LOW (ref 42–52)
HCT VFR BLD CALC: 22 % — LOW (ref 42–52)
HCT VFR BLD CALC: 22 % — LOW (ref 42–52)
HCT VFR BLD CALC: 23 % — LOW (ref 42–52)
HCT VFR BLD CALC: 23 % — LOW (ref 42–52)
HCT VFR BLD CALC: 23.1 % — LOW (ref 42–52)
HCT VFR BLD CALC: 23.1 % — LOW (ref 42–52)
HCT VFR BLD CALC: 23.3 % — LOW (ref 42–52)
HCT VFR BLD CALC: 23.7 % — LOW (ref 42–52)
HCT VFR BLD CALC: 23.8 % — LOW (ref 42–52)
HCT VFR BLD CALC: 23.9 % — LOW (ref 42–52)
HCT VFR BLD CALC: 23.9 % — LOW (ref 42–52)
HCT VFR BLD CALC: 24 % — LOW (ref 42–52)
HCT VFR BLD CALC: 24.1 % — LOW (ref 42–52)
HCT VFR BLD CALC: 24.1 % — LOW (ref 42–52)
HCT VFR BLD CALC: 24.4 % — LOW (ref 42–52)
HCT VFR BLD CALC: 24.6 % — LOW (ref 42–52)
HCT VFR BLD CALC: 24.7 % — LOW (ref 42–52)
HCT VFR BLD CALC: 24.8 % — LOW (ref 42–52)
HCT VFR BLD CALC: 24.9 % — LOW (ref 42–52)
HCT VFR BLD CALC: 25 % — LOW (ref 42–52)
HCT VFR BLD CALC: 25 % — LOW (ref 42–52)
HCT VFR BLD CALC: 25.1 % — LOW (ref 42–52)
HCT VFR BLD CALC: 25.2 % — LOW (ref 42–52)
HCT VFR BLD CALC: 25.3 % — LOW (ref 42–52)
HCT VFR BLD CALC: 25.3 % — LOW (ref 42–52)
HCT VFR BLD CALC: 25.6 % — LOW (ref 42–52)
HCT VFR BLD CALC: 25.8 % — LOW (ref 42–52)
HCT VFR BLD CALC: 25.8 % — LOW (ref 42–52)
HCT VFR BLD CALC: 26.1 % — LOW (ref 42–52)
HCT VFR BLD CALC: 26.3 % — LOW (ref 42–52)
HCT VFR BLD CALC: 26.5 % — LOW (ref 42–52)
HCT VFR BLD CALC: 26.6 % — LOW (ref 42–52)
HCT VFR BLD CALC: 26.9 % — LOW (ref 42–52)
HCT VFR BLD CALC: 27.1 % — LOW (ref 42–52)
HCT VFR BLD CALC: 27.3 % — LOW (ref 42–52)
HCT VFR BLD CALC: 27.4 % — LOW (ref 42–52)
HCT VFR BLD CALC: 27.5 % — LOW (ref 42–52)
HCT VFR BLD CALC: 27.7 % — LOW (ref 42–52)
HCT VFR BLD CALC: 28.5 % — LOW (ref 42–52)
HCT VFR BLD CALC: 28.6 % — LOW (ref 42–52)
HCT VFR BLD CALC: 28.7 % — LOW (ref 42–52)
HCT VFR BLD CALC: 28.7 % — LOW (ref 42–52)
HCT VFR BLD CALC: 28.8 % — LOW (ref 42–52)
HCT VFR BLD CALC: 29.1 % — LOW (ref 42–52)
HCT VFR BLD CALC: 29.5 % — LOW (ref 42–52)
HCT VFR BLD CALC: 29.5 % — LOW (ref 42–52)
HCT VFR BLD CALC: 29.7 % — LOW (ref 42–52)
HCT VFR BLD CALC: 29.8 % — LOW (ref 42–52)
HCT VFR BLD CALC: 29.9 % — LOW (ref 42–52)
HCT VFR BLD CALC: 30.2 % — LOW (ref 42–52)
HCT VFR BLD CALC: 30.4 % — LOW (ref 42–52)
HCT VFR BLD CALC: 30.9 % — LOW (ref 42–52)
HCT VFR BLD CALC: 31.1 % — LOW (ref 42–52)
HCT VFR BLD CALC: 31.5 % — LOW (ref 42–52)
HCT VFR BLD CALC: 31.5 % — LOW (ref 42–52)
HCT VFR BLD CALC: 31.9 % — LOW (ref 42–52)
HCT VFR BLD CALC: 32 % — LOW (ref 42–52)
HCT VFR BLD CALC: 32.2 % — LOW (ref 42–52)
HCT VFR BLD CALC: 32.2 % — LOW (ref 42–52)
HCT VFR BLD CALC: 32.5 % — LOW (ref 42–52)
HCT VFR BLD CALC: 32.5 % — LOW (ref 42–52)
HCT VFR BLD CALC: 33 % — LOW (ref 42–52)
HCT VFR BLD CALC: 33.2 % — LOW (ref 42–52)
HCT VFR BLD CALC: 34.3 % — LOW (ref 42–52)
HCT VFR BLDA CALC: 19.4 % — LOW (ref 34–44)
HCT VFR BLDA CALC: 25.2 % — LOW (ref 34–44)
HCT VFR BLDA CALC: 28 % — LOW (ref 34–44)
HCT VFR BLDA CALC: 29.2 % — LOW (ref 34–44)
HCV AB S/CO SERPL IA: 0.11 S/CO — SIGNIFICANT CHANGE UP (ref 0–0.99)
HCV AB S/CO SERPL IA: 0.13 S/CO — SIGNIFICANT CHANGE UP (ref 0–0.99)
HCV AB SERPL-IMP: SIGNIFICANT CHANGE UP
HCV AB SERPL-IMP: SIGNIFICANT CHANGE UP
HGB BLD CALC-MCNC: 6.3 G/DL — LOW (ref 14–18)
HGB BLD CALC-MCNC: 8.2 G/DL — LOW (ref 14–18)
HGB BLD CALC-MCNC: 9.1 G/DL — LOW (ref 14–18)
HGB BLD CALC-MCNC: 9.5 G/DL — LOW (ref 14–18)
HGB BLD-MCNC: 10 G/DL — LOW (ref 14–18)
HGB BLD-MCNC: 10.1 G/DL — LOW (ref 14–18)
HGB BLD-MCNC: 10.1 G/DL — LOW (ref 14–18)
HGB BLD-MCNC: 10.5 G/DL — LOW (ref 14–18)
HGB BLD-MCNC: 10.7 G/DL — LOW (ref 14–18)
HGB BLD-MCNC: 6 G/DL — CRITICAL LOW (ref 14–18)
HGB BLD-MCNC: 6.2 G/DL — CRITICAL LOW (ref 14–18)
HGB BLD-MCNC: 6.8 G/DL — CRITICAL LOW (ref 14–18)
HGB BLD-MCNC: 6.9 G/DL — CRITICAL LOW (ref 14–18)
HGB BLD-MCNC: 7 G/DL — LOW (ref 14–18)
HGB BLD-MCNC: 7.1 G/DL — LOW (ref 14–18)
HGB BLD-MCNC: 7.1 G/DL — LOW (ref 14–18)
HGB BLD-MCNC: 7.2 G/DL — LOW (ref 14–18)
HGB BLD-MCNC: 7.2 G/DL — LOW (ref 14–18)
HGB BLD-MCNC: 7.3 G/DL — LOW (ref 14–18)
HGB BLD-MCNC: 7.4 G/DL — LOW (ref 14–18)
HGB BLD-MCNC: 7.4 G/DL — LOW (ref 14–18)
HGB BLD-MCNC: 7.5 G/DL — LOW (ref 14–18)
HGB BLD-MCNC: 7.6 G/DL — LOW (ref 14–18)
HGB BLD-MCNC: 7.7 G/DL — LOW (ref 14–18)
HGB BLD-MCNC: 7.8 G/DL — LOW (ref 14–18)
HGB BLD-MCNC: 7.9 G/DL — LOW (ref 14–18)
HGB BLD-MCNC: 8 G/DL — LOW (ref 14–18)
HGB BLD-MCNC: 8.1 G/DL — LOW (ref 14–18)
HGB BLD-MCNC: 8.2 G/DL — LOW (ref 14–18)
HGB BLD-MCNC: 8.3 G/DL — LOW (ref 14–18)
HGB BLD-MCNC: 8.3 G/DL — LOW (ref 14–18)
HGB BLD-MCNC: 8.4 G/DL — LOW (ref 14–18)
HGB BLD-MCNC: 8.4 G/DL — LOW (ref 14–18)
HGB BLD-MCNC: 8.5 G/DL — LOW (ref 14–18)
HGB BLD-MCNC: 8.6 G/DL — LOW (ref 14–18)
HGB BLD-MCNC: 8.6 G/DL — LOW (ref 14–18)
HGB BLD-MCNC: 8.7 G/DL — LOW (ref 14–18)
HGB BLD-MCNC: 8.8 G/DL — LOW (ref 14–18)
HGB BLD-MCNC: 8.9 G/DL — LOW (ref 14–18)
HGB BLD-MCNC: 9 G/DL — LOW (ref 14–18)
HGB BLD-MCNC: 9.1 G/DL — LOW (ref 14–18)
HGB BLD-MCNC: 9.1 G/DL — LOW (ref 14–18)
HGB BLD-MCNC: 9.2 G/DL — LOW (ref 14–18)
HGB BLD-MCNC: 9.3 G/DL — LOW (ref 14–18)
HGB BLD-MCNC: 9.3 G/DL — LOW (ref 14–18)
HGB BLD-MCNC: 9.5 G/DL — LOW (ref 14–18)
HGB BLD-MCNC: 9.5 G/DL — LOW (ref 14–18)
HGB BLD-MCNC: 9.6 G/DL — LOW (ref 14–18)
HGB BLD-MCNC: 9.7 G/DL — LOW (ref 14–18)
HGB BLD-MCNC: 9.8 G/DL — LOW (ref 14–18)
HGB BLD-MCNC: 9.9 G/DL — LOW (ref 14–18)
HOROWITZ INDEX BLDA+IHG-RTO: 40 — SIGNIFICANT CHANGE UP
HOROWITZ INDEX BLDA+IHG-RTO: 60 — SIGNIFICANT CHANGE UP
HOROWITZ INDEX BLDV+IHG-RTO: 21 — SIGNIFICANT CHANGE UP
HYALINE CASTS # UR AUTO: 4 /LPF — SIGNIFICANT CHANGE UP (ref 0–7)
HYPOCHROMIA BLD QL: SLIGHT — SIGNIFICANT CHANGE UP
HYPOCHROMIA BLD QL: SLIGHT — SIGNIFICANT CHANGE UP
IMM GRANULOCYTES NFR BLD AUTO: 0.2 % — SIGNIFICANT CHANGE UP (ref 0.1–0.3)
IMM GRANULOCYTES NFR BLD AUTO: 0.3 % — SIGNIFICANT CHANGE UP (ref 0.1–0.3)
IMM GRANULOCYTES NFR BLD AUTO: 0.4 % — HIGH (ref 0.1–0.3)
IMM GRANULOCYTES NFR BLD AUTO: 0.5 % — HIGH (ref 0.1–0.3)
IMM GRANULOCYTES NFR BLD AUTO: 0.6 % — HIGH (ref 0.1–0.3)
IMM GRANULOCYTES NFR BLD AUTO: 0.7 % — HIGH (ref 0.1–0.3)
IMM GRANULOCYTES NFR BLD AUTO: 0.8 % — HIGH (ref 0.1–0.3)
IMM GRANULOCYTES NFR BLD AUTO: 1 % — HIGH (ref 0.1–0.3)
IMM GRANULOCYTES NFR BLD AUTO: 1.1 % — HIGH (ref 0.1–0.3)
INR BLD: 1.26 RATIO — SIGNIFICANT CHANGE UP (ref 0.65–1.3)
INR BLD: 1.27 RATIO — SIGNIFICANT CHANGE UP (ref 0.65–1.3)
INR BLD: 1.35 RATIO — HIGH (ref 0.65–1.3)
INR BLD: 1.41 RATIO — HIGH (ref 0.65–1.3)
INR BLD: 1.46 RATIO — HIGH (ref 0.65–1.3)
INR BLD: 1.49 RATIO — HIGH (ref 0.65–1.3)
INR BLD: 1.5 RATIO — HIGH (ref 0.65–1.3)
INR BLD: 1.52 RATIO — HIGH (ref 0.65–1.3)
INR BLD: 1.74 RATIO — HIGH (ref 0.65–1.3)
INR BLD: 1.74 RATIO — HIGH (ref 0.65–1.3)
INR BLD: 1.76 RATIO — HIGH (ref 0.65–1.3)
INR BLD: 1.76 RATIO — HIGH (ref 0.65–1.3)
INR BLD: 1.78 RATIO — HIGH (ref 0.65–1.3)
INR BLD: 1.87 RATIO — HIGH (ref 0.65–1.3)
INR BLD: 1.88 RATIO — HIGH (ref 0.65–1.3)
INR BLD: 2.08 RATIO — HIGH (ref 0.65–1.3)
INSULIN FREE SERPL-ACNC: ABNORMAL
INSULIN SERPL-MCNC: 2 UU/ML — LOW (ref 2.6–24.9)
INTERPRETATION 24H UR IFE-IMP: SIGNIFICANT CHANGE UP
INTERPRETATION 24H UR IFE-IMP: SIGNIFICANT CHANGE UP
INTERPRETATION SERPL IFE-IMP: SIGNIFICANT CHANGE UP
IRON SATN MFR SERPL: 27 UG/DL — LOW (ref 35–150)
IRON SATN MFR SERPL: 30 % — SIGNIFICANT CHANGE UP (ref 15–50)
IRON SATN MFR SERPL: 37 UG/DL — SIGNIFICANT CHANGE UP (ref 35–150)
IRON SATN MFR SERPL: 39 UG/DL — SIGNIFICANT CHANGE UP (ref 35–150)
IRON SATN MFR SERPL: 44 % — SIGNIFICANT CHANGE UP (ref 15–50)
IRON SATN MFR SERPL: 51 % — HIGH (ref 15–50)
KAPPA LC UR-MCNC: 191 MG/L — HIGH (ref 1.35–24.19)
KAPPA LC UR-MCNC: 200 MG/L — HIGH (ref 1.35–24.19)
KETONES UR-MCNC: NEGATIVE — SIGNIFICANT CHANGE UP
LACTATE BLDV-MCNC: 0.6 MMOL/L — SIGNIFICANT CHANGE UP (ref 0.5–1.6)
LACTATE BLDV-MCNC: 0.8 MMOL/L — SIGNIFICANT CHANGE UP (ref 0.5–1.6)
LACTATE BLDV-MCNC: 1 MMOL/L — SIGNIFICANT CHANGE UP (ref 0.5–1.6)
LACTATE BLDV-MCNC: 1.4 MMOL/L — SIGNIFICANT CHANGE UP (ref 0.5–1.6)
LACTATE SERPL-SCNC: 0.3 MMOL/L — LOW (ref 0.5–2.2)
LACTATE SERPL-SCNC: 0.4 MMOL/L — LOW (ref 0.5–2.2)
LACTATE SERPL-SCNC: 0.7 MMOL/L — SIGNIFICANT CHANGE UP (ref 0.5–2.2)
LACTATE SERPL-SCNC: 0.9 MMOL/L — SIGNIFICANT CHANGE UP (ref 0.7–2)
LACTATE SERPL-SCNC: 1.2 MMOL/L — SIGNIFICANT CHANGE UP (ref 0.5–2.2)
LAMBDA LC UR-MCNC: 93.7 MG/L — HIGH (ref 0.24–6.66)
LAMBDA LC UR-MCNC: 97.4 MG/L — HIGH (ref 0.24–6.66)
LDH SERPL L TO P-CCNC: 178 U/L — SIGNIFICANT CHANGE UP (ref 50–242)
LDH SERPL L TO P-CCNC: 190 U/L — SIGNIFICANT CHANGE UP (ref 50–242)
LEUKOCYTE ESTERASE UR-ACNC: ABNORMAL
LEUKOCYTE ESTERASE UR-ACNC: NEGATIVE — SIGNIFICANT CHANGE UP
LEUKOCYTE ESTERASE UR-ACNC: NEGATIVE — SIGNIFICANT CHANGE UP
LIDOCAIN IGE QN: 16 U/L — SIGNIFICANT CHANGE UP (ref 7–60)
LYMPHOCYTES # BLD AUTO: 1.3 K/UL — SIGNIFICANT CHANGE UP (ref 1.2–3.4)
LYMPHOCYTES # BLD AUTO: 1.32 K/UL — SIGNIFICANT CHANGE UP (ref 1.2–3.4)
LYMPHOCYTES # BLD AUTO: 1.33 K/UL — SIGNIFICANT CHANGE UP (ref 1.2–3.4)
LYMPHOCYTES # BLD AUTO: 1.35 K/UL — SIGNIFICANT CHANGE UP (ref 1.2–3.4)
LYMPHOCYTES # BLD AUTO: 1.39 K/UL — SIGNIFICANT CHANGE UP (ref 1.2–3.4)
LYMPHOCYTES # BLD AUTO: 1.48 K/UL — SIGNIFICANT CHANGE UP (ref 1.2–3.4)
LYMPHOCYTES # BLD AUTO: 1.5 K/UL — SIGNIFICANT CHANGE UP (ref 1.2–3.4)
LYMPHOCYTES # BLD AUTO: 1.5 K/UL — SIGNIFICANT CHANGE UP (ref 1.2–3.4)
LYMPHOCYTES # BLD AUTO: 1.54 K/UL — SIGNIFICANT CHANGE UP (ref 1.2–3.4)
LYMPHOCYTES # BLD AUTO: 1.59 K/UL — SIGNIFICANT CHANGE UP (ref 1.2–3.4)
LYMPHOCYTES # BLD AUTO: 1.6 K/UL — SIGNIFICANT CHANGE UP (ref 1.2–3.4)
LYMPHOCYTES # BLD AUTO: 1.61 K/UL — SIGNIFICANT CHANGE UP (ref 1.2–3.4)
LYMPHOCYTES # BLD AUTO: 1.66 K/UL — SIGNIFICANT CHANGE UP (ref 1.2–3.4)
LYMPHOCYTES # BLD AUTO: 1.67 K/UL — SIGNIFICANT CHANGE UP (ref 1.2–3.4)
LYMPHOCYTES # BLD AUTO: 1.71 K/UL — SIGNIFICANT CHANGE UP (ref 1.2–3.4)
LYMPHOCYTES # BLD AUTO: 1.73 K/UL — SIGNIFICANT CHANGE UP (ref 1.2–3.4)
LYMPHOCYTES # BLD AUTO: 1.74 K/UL — SIGNIFICANT CHANGE UP (ref 1.2–3.4)
LYMPHOCYTES # BLD AUTO: 1.76 K/UL — SIGNIFICANT CHANGE UP (ref 1.2–3.4)
LYMPHOCYTES # BLD AUTO: 1.78 K/UL — SIGNIFICANT CHANGE UP (ref 1.2–3.4)
LYMPHOCYTES # BLD AUTO: 1.78 K/UL — SIGNIFICANT CHANGE UP (ref 1.2–3.4)
LYMPHOCYTES # BLD AUTO: 1.83 K/UL — SIGNIFICANT CHANGE UP (ref 1.2–3.4)
LYMPHOCYTES # BLD AUTO: 1.85 K/UL — SIGNIFICANT CHANGE UP (ref 1.2–3.4)
LYMPHOCYTES # BLD AUTO: 1.85 K/UL — SIGNIFICANT CHANGE UP (ref 1.2–3.4)
LYMPHOCYTES # BLD AUTO: 1.86 K/UL — SIGNIFICANT CHANGE UP (ref 1.2–3.4)
LYMPHOCYTES # BLD AUTO: 1.86 K/UL — SIGNIFICANT CHANGE UP (ref 1.2–3.4)
LYMPHOCYTES # BLD AUTO: 1.89 K/UL — SIGNIFICANT CHANGE UP (ref 1.2–3.4)
LYMPHOCYTES # BLD AUTO: 1.89 K/UL — SIGNIFICANT CHANGE UP (ref 1.2–3.4)
LYMPHOCYTES # BLD AUTO: 1.92 K/UL — SIGNIFICANT CHANGE UP (ref 1.2–3.4)
LYMPHOCYTES # BLD AUTO: 1.98 K/UL — SIGNIFICANT CHANGE UP (ref 1.2–3.4)
LYMPHOCYTES # BLD AUTO: 1.98 K/UL — SIGNIFICANT CHANGE UP (ref 1.2–3.4)
LYMPHOCYTES # BLD AUTO: 10 % — LOW (ref 20.5–51.1)
LYMPHOCYTES # BLD AUTO: 10.5 % — LOW (ref 20.5–51.1)
LYMPHOCYTES # BLD AUTO: 10.6 % — LOW (ref 20.5–51.1)
LYMPHOCYTES # BLD AUTO: 10.8 % — LOW (ref 20.5–51.1)
LYMPHOCYTES # BLD AUTO: 11.3 % — LOW (ref 20.5–51.1)
LYMPHOCYTES # BLD AUTO: 11.7 % — LOW (ref 20.5–51.1)
LYMPHOCYTES # BLD AUTO: 12 % — LOW (ref 20.5–51.1)
LYMPHOCYTES # BLD AUTO: 12.7 % — LOW (ref 20.5–51.1)
LYMPHOCYTES # BLD AUTO: 12.8 % — LOW (ref 20.5–51.1)
LYMPHOCYTES # BLD AUTO: 13.2 % — LOW (ref 20.5–51.1)
LYMPHOCYTES # BLD AUTO: 14 % — LOW (ref 20.5–51.1)
LYMPHOCYTES # BLD AUTO: 14.6 % — LOW (ref 20.5–51.1)
LYMPHOCYTES # BLD AUTO: 15 % — LOW (ref 20.5–51.1)
LYMPHOCYTES # BLD AUTO: 15.1 % — LOW (ref 20.5–51.1)
LYMPHOCYTES # BLD AUTO: 16 % — LOW (ref 20.5–51.1)
LYMPHOCYTES # BLD AUTO: 16.6 % — LOW (ref 20.5–51.1)
LYMPHOCYTES # BLD AUTO: 16.8 % — LOW (ref 20.5–51.1)
LYMPHOCYTES # BLD AUTO: 16.9 % — LOW (ref 20.5–51.1)
LYMPHOCYTES # BLD AUTO: 17.4 % — LOW (ref 20.5–51.1)
LYMPHOCYTES # BLD AUTO: 17.7 % — LOW (ref 20.5–51.1)
LYMPHOCYTES # BLD AUTO: 17.8 % — LOW (ref 20.5–51.1)
LYMPHOCYTES # BLD AUTO: 18.1 % — LOW (ref 20.5–51.1)
LYMPHOCYTES # BLD AUTO: 18.2 % — LOW (ref 20.5–51.1)
LYMPHOCYTES # BLD AUTO: 18.5 % — LOW (ref 20.5–51.1)
LYMPHOCYTES # BLD AUTO: 19.1 % — LOW (ref 20.5–51.1)
LYMPHOCYTES # BLD AUTO: 19.3 % — LOW (ref 20.5–51.1)
LYMPHOCYTES # BLD AUTO: 19.7 % — LOW (ref 20.5–51.1)
LYMPHOCYTES # BLD AUTO: 19.8 % — LOW (ref 20.5–51.1)
LYMPHOCYTES # BLD AUTO: 2.01 K/UL — SIGNIFICANT CHANGE UP (ref 1.2–3.4)
LYMPHOCYTES # BLD AUTO: 2.02 K/UL — SIGNIFICANT CHANGE UP (ref 1.2–3.4)
LYMPHOCYTES # BLD AUTO: 2.04 K/UL — SIGNIFICANT CHANGE UP (ref 1.2–3.4)
LYMPHOCYTES # BLD AUTO: 2.04 K/UL — SIGNIFICANT CHANGE UP (ref 1.2–3.4)
LYMPHOCYTES # BLD AUTO: 2.06 K/UL — SIGNIFICANT CHANGE UP (ref 1.2–3.4)
LYMPHOCYTES # BLD AUTO: 2.07 K/UL — SIGNIFICANT CHANGE UP (ref 1.2–3.4)
LYMPHOCYTES # BLD AUTO: 2.07 K/UL — SIGNIFICANT CHANGE UP (ref 1.2–3.4)
LYMPHOCYTES # BLD AUTO: 2.1 K/UL — SIGNIFICANT CHANGE UP (ref 1.2–3.4)
LYMPHOCYTES # BLD AUTO: 2.11 K/UL — SIGNIFICANT CHANGE UP (ref 1.2–3.4)
LYMPHOCYTES # BLD AUTO: 2.15 K/UL — SIGNIFICANT CHANGE UP (ref 1.2–3.4)
LYMPHOCYTES # BLD AUTO: 2.15 K/UL — SIGNIFICANT CHANGE UP (ref 1.2–3.4)
LYMPHOCYTES # BLD AUTO: 2.16 K/UL — SIGNIFICANT CHANGE UP (ref 1.2–3.4)
LYMPHOCYTES # BLD AUTO: 2.18 K/UL — SIGNIFICANT CHANGE UP (ref 1.2–3.4)
LYMPHOCYTES # BLD AUTO: 2.21 K/UL — SIGNIFICANT CHANGE UP (ref 1.2–3.4)
LYMPHOCYTES # BLD AUTO: 2.26 K/UL — SIGNIFICANT CHANGE UP (ref 1.2–3.4)
LYMPHOCYTES # BLD AUTO: 2.26 K/UL — SIGNIFICANT CHANGE UP (ref 1.2–3.4)
LYMPHOCYTES # BLD AUTO: 2.27 K/UL — SIGNIFICANT CHANGE UP (ref 1.2–3.4)
LYMPHOCYTES # BLD AUTO: 2.31 K/UL — SIGNIFICANT CHANGE UP (ref 1.2–3.4)
LYMPHOCYTES # BLD AUTO: 2.32 K/UL — SIGNIFICANT CHANGE UP (ref 1.2–3.4)
LYMPHOCYTES # BLD AUTO: 2.35 K/UL — SIGNIFICANT CHANGE UP (ref 1.2–3.4)
LYMPHOCYTES # BLD AUTO: 2.4 K/UL — SIGNIFICANT CHANGE UP (ref 1.2–3.4)
LYMPHOCYTES # BLD AUTO: 2.41 K/UL — SIGNIFICANT CHANGE UP (ref 1.2–3.4)
LYMPHOCYTES # BLD AUTO: 2.42 K/UL — SIGNIFICANT CHANGE UP (ref 1.2–3.4)
LYMPHOCYTES # BLD AUTO: 2.44 K/UL — SIGNIFICANT CHANGE UP (ref 1.2–3.4)
LYMPHOCYTES # BLD AUTO: 2.49 K/UL — SIGNIFICANT CHANGE UP (ref 1.2–3.4)
LYMPHOCYTES # BLD AUTO: 2.49 K/UL — SIGNIFICANT CHANGE UP (ref 1.2–3.4)
LYMPHOCYTES # BLD AUTO: 2.53 K/UL — SIGNIFICANT CHANGE UP (ref 1.2–3.4)
LYMPHOCYTES # BLD AUTO: 2.56 K/UL — SIGNIFICANT CHANGE UP (ref 1.2–3.4)
LYMPHOCYTES # BLD AUTO: 2.61 K/UL — SIGNIFICANT CHANGE UP (ref 1.2–3.4)
LYMPHOCYTES # BLD AUTO: 2.64 K/UL — SIGNIFICANT CHANGE UP (ref 1.2–3.4)
LYMPHOCYTES # BLD AUTO: 2.65 K/UL — SIGNIFICANT CHANGE UP (ref 1.2–3.4)
LYMPHOCYTES # BLD AUTO: 2.7 K/UL — SIGNIFICANT CHANGE UP (ref 1.2–3.4)
LYMPHOCYTES # BLD AUTO: 2.74 K/UL — SIGNIFICANT CHANGE UP (ref 1.2–3.4)
LYMPHOCYTES # BLD AUTO: 2.86 K/UL — SIGNIFICANT CHANGE UP (ref 1.2–3.4)
LYMPHOCYTES # BLD AUTO: 2.91 K/UL — SIGNIFICANT CHANGE UP (ref 1.2–3.4)
LYMPHOCYTES # BLD AUTO: 20.4 % — LOW (ref 20.5–51.1)
LYMPHOCYTES # BLD AUTO: 20.5 % — SIGNIFICANT CHANGE UP (ref 20.5–51.1)
LYMPHOCYTES # BLD AUTO: 20.7 % — SIGNIFICANT CHANGE UP (ref 20.5–51.1)
LYMPHOCYTES # BLD AUTO: 21.4 % — SIGNIFICANT CHANGE UP (ref 20.5–51.1)
LYMPHOCYTES # BLD AUTO: 21.9 % — SIGNIFICANT CHANGE UP (ref 20.5–51.1)
LYMPHOCYTES # BLD AUTO: 22.2 % — SIGNIFICANT CHANGE UP (ref 20.5–51.1)
LYMPHOCYTES # BLD AUTO: 22.3 % — SIGNIFICANT CHANGE UP (ref 20.5–51.1)
LYMPHOCYTES # BLD AUTO: 22.6 % — SIGNIFICANT CHANGE UP (ref 20.5–51.1)
LYMPHOCYTES # BLD AUTO: 23.1 % — SIGNIFICANT CHANGE UP (ref 20.5–51.1)
LYMPHOCYTES # BLD AUTO: 23.2 % — SIGNIFICANT CHANGE UP (ref 20.5–51.1)
LYMPHOCYTES # BLD AUTO: 23.4 % — SIGNIFICANT CHANGE UP (ref 20.5–51.1)
LYMPHOCYTES # BLD AUTO: 23.9 % — SIGNIFICANT CHANGE UP (ref 20.5–51.1)
LYMPHOCYTES # BLD AUTO: 24.2 % — SIGNIFICANT CHANGE UP (ref 20.5–51.1)
LYMPHOCYTES # BLD AUTO: 24.3 % — SIGNIFICANT CHANGE UP (ref 20.5–51.1)
LYMPHOCYTES # BLD AUTO: 24.3 % — SIGNIFICANT CHANGE UP (ref 20.5–51.1)
LYMPHOCYTES # BLD AUTO: 24.4 % — SIGNIFICANT CHANGE UP (ref 20.5–51.1)
LYMPHOCYTES # BLD AUTO: 24.4 % — SIGNIFICANT CHANGE UP (ref 20.5–51.1)
LYMPHOCYTES # BLD AUTO: 24.6 % — SIGNIFICANT CHANGE UP (ref 20.5–51.1)
LYMPHOCYTES # BLD AUTO: 24.6 % — SIGNIFICANT CHANGE UP (ref 20.5–51.1)
LYMPHOCYTES # BLD AUTO: 25.3 % — SIGNIFICANT CHANGE UP (ref 20.5–51.1)
LYMPHOCYTES # BLD AUTO: 26.4 % — SIGNIFICANT CHANGE UP (ref 20.5–51.1)
LYMPHOCYTES # BLD AUTO: 26.6 % — SIGNIFICANT CHANGE UP (ref 20.5–51.1)
LYMPHOCYTES # BLD AUTO: 26.8 % — SIGNIFICANT CHANGE UP (ref 20.5–51.1)
LYMPHOCYTES # BLD AUTO: 27.1 % — SIGNIFICANT CHANGE UP (ref 20.5–51.1)
LYMPHOCYTES # BLD AUTO: 27.2 % — SIGNIFICANT CHANGE UP (ref 20.5–51.1)
LYMPHOCYTES # BLD AUTO: 28.1 % — SIGNIFICANT CHANGE UP (ref 20.5–51.1)
LYMPHOCYTES # BLD AUTO: 28.8 % — SIGNIFICANT CHANGE UP (ref 20.5–51.1)
LYMPHOCYTES # BLD AUTO: 28.9 % — SIGNIFICANT CHANGE UP (ref 20.5–51.1)
LYMPHOCYTES # BLD AUTO: 28.9 % — SIGNIFICANT CHANGE UP (ref 20.5–51.1)
LYMPHOCYTES # BLD AUTO: 29.1 % — SIGNIFICANT CHANGE UP (ref 20.5–51.1)
LYMPHOCYTES # BLD AUTO: 29.9 % — SIGNIFICANT CHANGE UP (ref 20.5–51.1)
LYMPHOCYTES # BLD AUTO: 3.21 K/UL — SIGNIFICANT CHANGE UP (ref 1.2–3.4)
LYMPHOCYTES # BLD AUTO: 3.33 K/UL — SIGNIFICANT CHANGE UP (ref 1.2–3.4)
LYMPHOCYTES # BLD AUTO: 3.37 K/UL — SIGNIFICANT CHANGE UP (ref 1.2–3.4)
LYMPHOCYTES # BLD AUTO: 31.6 % — SIGNIFICANT CHANGE UP (ref 20.5–51.1)
LYMPHOCYTES # BLD AUTO: 31.9 % — SIGNIFICANT CHANGE UP (ref 20.5–51.1)
LYMPHOCYTES # BLD AUTO: 33.7 % — SIGNIFICANT CHANGE UP (ref 20.5–51.1)
LYMPHOCYTES # BLD AUTO: 34.9 % — SIGNIFICANT CHANGE UP (ref 20.5–51.1)
MAGNESIUM SERPL-MCNC: 1.1 MG/DL — LOW (ref 1.8–2.4)
MAGNESIUM SERPL-MCNC: 1.2 MG/DL — LOW (ref 1.8–2.4)
MAGNESIUM SERPL-MCNC: 1.3 MG/DL — LOW (ref 1.8–2.4)
MAGNESIUM SERPL-MCNC: 1.4 MG/DL — LOW (ref 1.8–2.4)
MAGNESIUM SERPL-MCNC: 1.5 MG/DL — LOW (ref 1.8–2.4)
MAGNESIUM SERPL-MCNC: 1.6 MG/DL — LOW (ref 1.8–2.4)
MAGNESIUM SERPL-MCNC: 1.7 MG/DL — LOW (ref 1.8–2.4)
MAGNESIUM SERPL-MCNC: 1.8 MG/DL — SIGNIFICANT CHANGE UP (ref 1.8–2.4)
MAGNESIUM SERPL-MCNC: 1.9 MG/DL — SIGNIFICANT CHANGE UP (ref 1.8–2.4)
MAGNESIUM SERPL-MCNC: 2 MG/DL — SIGNIFICANT CHANGE UP (ref 1.8–2.4)
MAGNESIUM UR-MCNC: 1.4 MG/DL — SIGNIFICANT CHANGE UP
MANUAL SMEAR VERIFICATION: SIGNIFICANT CHANGE UP
MANUAL SMEAR VERIFICATION: SIGNIFICANT CHANGE UP
MCHC RBC-ENTMCNC: 22.6 PG — LOW (ref 27–31)
MCHC RBC-ENTMCNC: 23.1 PG — LOW (ref 27–31)
MCHC RBC-ENTMCNC: 23.2 PG — LOW (ref 27–31)
MCHC RBC-ENTMCNC: 23.7 PG — LOW (ref 27–31)
MCHC RBC-ENTMCNC: 23.8 PG — LOW (ref 27–31)
MCHC RBC-ENTMCNC: 23.8 PG — LOW (ref 27–31)
MCHC RBC-ENTMCNC: 24.1 PG — LOW (ref 27–31)
MCHC RBC-ENTMCNC: 24.2 PG — LOW (ref 27–31)
MCHC RBC-ENTMCNC: 24.2 PG — LOW (ref 27–31)
MCHC RBC-ENTMCNC: 24.3 PG — LOW (ref 27–31)
MCHC RBC-ENTMCNC: 25.2 PG — LOW (ref 27–31)
MCHC RBC-ENTMCNC: 25.6 PG — LOW (ref 27–31)
MCHC RBC-ENTMCNC: 25.6 PG — LOW (ref 27–31)
MCHC RBC-ENTMCNC: 25.9 PG — LOW (ref 27–31)
MCHC RBC-ENTMCNC: 26 PG — LOW (ref 27–31)
MCHC RBC-ENTMCNC: 26 PG — LOW (ref 27–31)
MCHC RBC-ENTMCNC: 26.2 PG — LOW (ref 27–31)
MCHC RBC-ENTMCNC: 26.3 PG — LOW (ref 27–31)
MCHC RBC-ENTMCNC: 26.3 PG — LOW (ref 27–31)
MCHC RBC-ENTMCNC: 26.4 PG — LOW (ref 27–31)
MCHC RBC-ENTMCNC: 26.4 PG — LOW (ref 27–31)
MCHC RBC-ENTMCNC: 26.5 PG — LOW (ref 27–31)
MCHC RBC-ENTMCNC: 26.6 PG — LOW (ref 27–31)
MCHC RBC-ENTMCNC: 26.7 PG — LOW (ref 27–31)
MCHC RBC-ENTMCNC: 26.7 PG — LOW (ref 27–31)
MCHC RBC-ENTMCNC: 26.8 PG — LOW (ref 27–31)
MCHC RBC-ENTMCNC: 26.9 PG — LOW (ref 27–31)
MCHC RBC-ENTMCNC: 26.9 PG — LOW (ref 27–31)
MCHC RBC-ENTMCNC: 27 PG — SIGNIFICANT CHANGE UP (ref 27–31)
MCHC RBC-ENTMCNC: 27.1 PG — SIGNIFICANT CHANGE UP (ref 27–31)
MCHC RBC-ENTMCNC: 27.2 PG — SIGNIFICANT CHANGE UP (ref 27–31)
MCHC RBC-ENTMCNC: 27.3 PG — SIGNIFICANT CHANGE UP (ref 27–31)
MCHC RBC-ENTMCNC: 27.3 PG — SIGNIFICANT CHANGE UP (ref 27–31)
MCHC RBC-ENTMCNC: 27.5 PG — SIGNIFICANT CHANGE UP (ref 27–31)
MCHC RBC-ENTMCNC: 27.6 PG — SIGNIFICANT CHANGE UP (ref 27–31)
MCHC RBC-ENTMCNC: 27.6 PG — SIGNIFICANT CHANGE UP (ref 27–31)
MCHC RBC-ENTMCNC: 27.7 PG — SIGNIFICANT CHANGE UP (ref 27–31)
MCHC RBC-ENTMCNC: 27.8 PG — SIGNIFICANT CHANGE UP (ref 27–31)
MCHC RBC-ENTMCNC: 27.9 PG — SIGNIFICANT CHANGE UP (ref 27–31)
MCHC RBC-ENTMCNC: 28 PG — SIGNIFICANT CHANGE UP (ref 27–31)
MCHC RBC-ENTMCNC: 28.1 PG — SIGNIFICANT CHANGE UP (ref 27–31)
MCHC RBC-ENTMCNC: 28.3 PG — SIGNIFICANT CHANGE UP (ref 27–31)
MCHC RBC-ENTMCNC: 28.5 PG — SIGNIFICANT CHANGE UP (ref 27–31)
MCHC RBC-ENTMCNC: 29.3 G/DL — LOW (ref 32–37)
MCHC RBC-ENTMCNC: 29.5 G/DL — LOW (ref 32–37)
MCHC RBC-ENTMCNC: 29.7 G/DL — LOW (ref 32–37)
MCHC RBC-ENTMCNC: 29.7 G/DL — LOW (ref 32–37)
MCHC RBC-ENTMCNC: 29.8 G/DL — LOW (ref 32–37)
MCHC RBC-ENTMCNC: 29.8 G/DL — LOW (ref 32–37)
MCHC RBC-ENTMCNC: 30 G/DL — LOW (ref 32–37)
MCHC RBC-ENTMCNC: 30.1 G/DL — LOW (ref 32–37)
MCHC RBC-ENTMCNC: 30.1 G/DL — LOW (ref 32–37)
MCHC RBC-ENTMCNC: 30.2 G/DL — LOW (ref 32–37)
MCHC RBC-ENTMCNC: 30.3 G/DL — LOW (ref 32–37)
MCHC RBC-ENTMCNC: 30.4 G/DL — LOW (ref 32–37)
MCHC RBC-ENTMCNC: 30.5 G/DL — LOW (ref 32–37)
MCHC RBC-ENTMCNC: 30.6 G/DL — LOW (ref 32–37)
MCHC RBC-ENTMCNC: 30.7 G/DL — LOW (ref 32–37)
MCHC RBC-ENTMCNC: 30.8 G/DL — LOW (ref 32–37)
MCHC RBC-ENTMCNC: 30.9 G/DL — LOW (ref 32–37)
MCHC RBC-ENTMCNC: 31 G/DL — LOW (ref 32–37)
MCHC RBC-ENTMCNC: 31.1 G/DL — LOW (ref 32–37)
MCHC RBC-ENTMCNC: 31.2 G/DL — LOW (ref 32–37)
MCHC RBC-ENTMCNC: 31.3 G/DL — LOW (ref 32–37)
MCHC RBC-ENTMCNC: 31.4 G/DL — LOW (ref 32–37)
MCHC RBC-ENTMCNC: 31.4 G/DL — LOW (ref 32–37)
MCHC RBC-ENTMCNC: 31.5 G/DL — LOW (ref 32–37)
MCHC RBC-ENTMCNC: 31.5 G/DL — LOW (ref 32–37)
MCHC RBC-ENTMCNC: 31.6 G/DL — LOW (ref 32–37)
MCHC RBC-ENTMCNC: 31.7 G/DL — LOW (ref 32–37)
MCHC RBC-ENTMCNC: 31.8 G/DL — LOW (ref 32–37)
MCHC RBC-ENTMCNC: 31.8 G/DL — LOW (ref 32–37)
MCHC RBC-ENTMCNC: 32 G/DL — SIGNIFICANT CHANGE UP (ref 32–37)
MCHC RBC-ENTMCNC: 32 G/DL — SIGNIFICANT CHANGE UP (ref 32–37)
MCHC RBC-ENTMCNC: 32.1 G/DL — SIGNIFICANT CHANGE UP (ref 32–37)
MCHC RBC-ENTMCNC: 32.1 G/DL — SIGNIFICANT CHANGE UP (ref 32–37)
MCHC RBC-ENTMCNC: 32.3 G/DL — SIGNIFICANT CHANGE UP (ref 32–37)
MCHC RBC-ENTMCNC: 32.4 G/DL — SIGNIFICANT CHANGE UP (ref 32–37)
MCHC RBC-ENTMCNC: 32.4 G/DL — SIGNIFICANT CHANGE UP (ref 32–37)
MCHC RBC-ENTMCNC: 32.9 G/DL — SIGNIFICANT CHANGE UP (ref 32–37)
MCHC RBC-ENTMCNC: 33 G/DL — SIGNIFICANT CHANGE UP (ref 32–37)
MCHC RBC-ENTMCNC: 33.6 G/DL — SIGNIFICANT CHANGE UP (ref 32–37)
MCV RBC AUTO: 75.5 FL — LOW (ref 80–94)
MCV RBC AUTO: 76.2 FL — LOW (ref 80–94)
MCV RBC AUTO: 76.2 FL — LOW (ref 80–94)
MCV RBC AUTO: 77.1 FL — LOW (ref 80–94)
MCV RBC AUTO: 77.7 FL — LOW (ref 80–94)
MCV RBC AUTO: 77.8 FL — LOW (ref 80–94)
MCV RBC AUTO: 78.4 FL — LOW (ref 80–94)
MCV RBC AUTO: 78.6 FL — LOW (ref 80–94)
MCV RBC AUTO: 79.1 FL — LOW (ref 80–94)
MCV RBC AUTO: 79.7 FL — LOW (ref 80–94)
MCV RBC AUTO: 81.1 FL — SIGNIFICANT CHANGE UP (ref 80–94)
MCV RBC AUTO: 82.6 FL — SIGNIFICANT CHANGE UP (ref 80–94)
MCV RBC AUTO: 82.7 FL — SIGNIFICANT CHANGE UP (ref 80–94)
MCV RBC AUTO: 83 FL — SIGNIFICANT CHANGE UP (ref 80–94)
MCV RBC AUTO: 83.1 FL — SIGNIFICANT CHANGE UP (ref 80–94)
MCV RBC AUTO: 83.4 FL — SIGNIFICANT CHANGE UP (ref 80–94)
MCV RBC AUTO: 83.5 FL — SIGNIFICANT CHANGE UP (ref 80–94)
MCV RBC AUTO: 83.9 FL — SIGNIFICANT CHANGE UP (ref 80–94)
MCV RBC AUTO: 84 FL — SIGNIFICANT CHANGE UP (ref 80–94)
MCV RBC AUTO: 84.3 FL — SIGNIFICANT CHANGE UP (ref 80–94)
MCV RBC AUTO: 84.6 FL — SIGNIFICANT CHANGE UP (ref 80–94)
MCV RBC AUTO: 84.9 FL — SIGNIFICANT CHANGE UP (ref 80–94)
MCV RBC AUTO: 84.9 FL — SIGNIFICANT CHANGE UP (ref 80–94)
MCV RBC AUTO: 85.3 FL — SIGNIFICANT CHANGE UP (ref 80–94)
MCV RBC AUTO: 85.3 FL — SIGNIFICANT CHANGE UP (ref 80–94)
MCV RBC AUTO: 85.5 FL — SIGNIFICANT CHANGE UP (ref 80–94)
MCV RBC AUTO: 85.8 FL — SIGNIFICANT CHANGE UP (ref 80–94)
MCV RBC AUTO: 85.9 FL — SIGNIFICANT CHANGE UP (ref 80–94)
MCV RBC AUTO: 86.1 FL — SIGNIFICANT CHANGE UP (ref 80–94)
MCV RBC AUTO: 86.2 FL — SIGNIFICANT CHANGE UP (ref 80–94)
MCV RBC AUTO: 86.3 FL — SIGNIFICANT CHANGE UP (ref 80–94)
MCV RBC AUTO: 86.8 FL — SIGNIFICANT CHANGE UP (ref 80–94)
MCV RBC AUTO: 86.8 FL — SIGNIFICANT CHANGE UP (ref 80–94)
MCV RBC AUTO: 87.1 FL — SIGNIFICANT CHANGE UP (ref 80–94)
MCV RBC AUTO: 87.4 FL — SIGNIFICANT CHANGE UP (ref 80–94)
MCV RBC AUTO: 87.5 FL — SIGNIFICANT CHANGE UP (ref 80–94)
MCV RBC AUTO: 87.5 FL — SIGNIFICANT CHANGE UP (ref 80–94)
MCV RBC AUTO: 87.6 FL — SIGNIFICANT CHANGE UP (ref 80–94)
MCV RBC AUTO: 87.7 FL — SIGNIFICANT CHANGE UP (ref 80–94)
MCV RBC AUTO: 87.8 FL — SIGNIFICANT CHANGE UP (ref 80–94)
MCV RBC AUTO: 87.9 FL — SIGNIFICANT CHANGE UP (ref 80–94)
MCV RBC AUTO: 87.9 FL — SIGNIFICANT CHANGE UP (ref 80–94)
MCV RBC AUTO: 88 FL — SIGNIFICANT CHANGE UP (ref 80–94)
MCV RBC AUTO: 88 FL — SIGNIFICANT CHANGE UP (ref 80–94)
MCV RBC AUTO: 88.5 FL — SIGNIFICANT CHANGE UP (ref 80–94)
MCV RBC AUTO: 88.5 FL — SIGNIFICANT CHANGE UP (ref 80–94)
MCV RBC AUTO: 88.6 FL — SIGNIFICANT CHANGE UP (ref 80–94)
MCV RBC AUTO: 88.7 FL — SIGNIFICANT CHANGE UP (ref 80–94)
MCV RBC AUTO: 88.9 FL — SIGNIFICANT CHANGE UP (ref 80–94)
MCV RBC AUTO: 88.9 FL — SIGNIFICANT CHANGE UP (ref 80–94)
MCV RBC AUTO: 89 FL — SIGNIFICANT CHANGE UP (ref 80–94)
MCV RBC AUTO: 89.1 FL — SIGNIFICANT CHANGE UP (ref 80–94)
MCV RBC AUTO: 89.2 FL — SIGNIFICANT CHANGE UP (ref 80–94)
MCV RBC AUTO: 89.2 FL — SIGNIFICANT CHANGE UP (ref 80–94)
MCV RBC AUTO: 89.3 FL — SIGNIFICANT CHANGE UP (ref 80–94)
MCV RBC AUTO: 89.4 FL — SIGNIFICANT CHANGE UP (ref 80–94)
MCV RBC AUTO: 89.5 FL — SIGNIFICANT CHANGE UP (ref 80–94)
MCV RBC AUTO: 89.7 FL — SIGNIFICANT CHANGE UP (ref 80–94)
MCV RBC AUTO: 89.7 FL — SIGNIFICANT CHANGE UP (ref 80–94)
MCV RBC AUTO: 89.9 FL — SIGNIFICANT CHANGE UP (ref 80–94)
MCV RBC AUTO: 90.1 FL — SIGNIFICANT CHANGE UP (ref 80–94)
MCV RBC AUTO: 90.2 FL — SIGNIFICANT CHANGE UP (ref 80–94)
MCV RBC AUTO: 90.3 FL — SIGNIFICANT CHANGE UP (ref 80–94)
MCV RBC AUTO: 90.4 FL — SIGNIFICANT CHANGE UP (ref 80–94)
MCV RBC AUTO: 90.4 FL — SIGNIFICANT CHANGE UP (ref 80–94)
MCV RBC AUTO: 90.5 FL — SIGNIFICANT CHANGE UP (ref 80–94)
MCV RBC AUTO: 90.5 FL — SIGNIFICANT CHANGE UP (ref 80–94)
MCV RBC AUTO: 90.7 FL — SIGNIFICANT CHANGE UP (ref 80–94)
MCV RBC AUTO: 90.7 FL — SIGNIFICANT CHANGE UP (ref 80–94)
MCV RBC AUTO: 90.9 FL — SIGNIFICANT CHANGE UP (ref 80–94)
MCV RBC AUTO: 90.9 FL — SIGNIFICANT CHANGE UP (ref 80–94)
MCV RBC AUTO: 91 FL — SIGNIFICANT CHANGE UP (ref 80–94)
MCV RBC AUTO: 91 FL — SIGNIFICANT CHANGE UP (ref 80–94)
MCV RBC AUTO: 91.2 FL — SIGNIFICANT CHANGE UP (ref 80–94)
MCV RBC AUTO: 91.3 FL — SIGNIFICANT CHANGE UP (ref 80–94)
MCV RBC AUTO: 91.3 FL — SIGNIFICANT CHANGE UP (ref 80–94)
MCV RBC AUTO: 91.4 FL — SIGNIFICANT CHANGE UP (ref 80–94)
MCV RBC AUTO: 91.5 FL — SIGNIFICANT CHANGE UP (ref 80–94)
MCV RBC AUTO: 92 FL — SIGNIFICANT CHANGE UP (ref 80–94)
MCV RBC AUTO: 92.2 FL — SIGNIFICANT CHANGE UP (ref 80–94)
METHADONE UR CFM-MCNC: 6180 NG/MG CREAT — SIGNIFICANT CHANGE UP
METHADONE UR CFM-MCNC: ABNORMAL
METHADONE UR-MCNC: NEGATIVE — SIGNIFICANT CHANGE UP
METHADONE UR-MCNC: POSITIVE
METHOD TYPE: SIGNIFICANT CHANGE UP
MICROCYTES BLD QL: SIGNIFICANT CHANGE UP
MICROCYTES BLD QL: SLIGHT — SIGNIFICANT CHANGE UP
MONOCYTES # BLD AUTO: 0.27 K/UL — SIGNIFICANT CHANGE UP (ref 0.1–0.6)
MONOCYTES # BLD AUTO: 0.38 K/UL — SIGNIFICANT CHANGE UP (ref 0.1–0.6)
MONOCYTES # BLD AUTO: 0.4 K/UL — SIGNIFICANT CHANGE UP (ref 0.1–0.6)
MONOCYTES # BLD AUTO: 0.43 K/UL — SIGNIFICANT CHANGE UP (ref 0.1–0.6)
MONOCYTES # BLD AUTO: 0.44 K/UL — SIGNIFICANT CHANGE UP (ref 0.1–0.6)
MONOCYTES # BLD AUTO: 0.45 K/UL — SIGNIFICANT CHANGE UP (ref 0.1–0.6)
MONOCYTES # BLD AUTO: 0.46 K/UL — SIGNIFICANT CHANGE UP (ref 0.1–0.6)
MONOCYTES # BLD AUTO: 0.47 K/UL — SIGNIFICANT CHANGE UP (ref 0.1–0.6)
MONOCYTES # BLD AUTO: 0.48 K/UL — SIGNIFICANT CHANGE UP (ref 0.1–0.6)
MONOCYTES # BLD AUTO: 0.49 K/UL — SIGNIFICANT CHANGE UP (ref 0.1–0.6)
MONOCYTES # BLD AUTO: 0.5 K/UL — SIGNIFICANT CHANGE UP (ref 0.1–0.6)
MONOCYTES # BLD AUTO: 0.51 K/UL — SIGNIFICANT CHANGE UP (ref 0.1–0.6)
MONOCYTES # BLD AUTO: 0.51 K/UL — SIGNIFICANT CHANGE UP (ref 0.1–0.6)
MONOCYTES # BLD AUTO: 0.52 K/UL — SIGNIFICANT CHANGE UP (ref 0.1–0.6)
MONOCYTES # BLD AUTO: 0.52 K/UL — SIGNIFICANT CHANGE UP (ref 0.1–0.6)
MONOCYTES # BLD AUTO: 0.53 K/UL — SIGNIFICANT CHANGE UP (ref 0.1–0.6)
MONOCYTES # BLD AUTO: 0.54 K/UL — SIGNIFICANT CHANGE UP (ref 0.1–0.6)
MONOCYTES # BLD AUTO: 0.54 K/UL — SIGNIFICANT CHANGE UP (ref 0.1–0.6)
MONOCYTES # BLD AUTO: 0.55 K/UL — SIGNIFICANT CHANGE UP (ref 0.1–0.6)
MONOCYTES # BLD AUTO: 0.56 K/UL — SIGNIFICANT CHANGE UP (ref 0.1–0.6)
MONOCYTES # BLD AUTO: 0.57 K/UL — SIGNIFICANT CHANGE UP (ref 0.1–0.6)
MONOCYTES # BLD AUTO: 0.57 K/UL — SIGNIFICANT CHANGE UP (ref 0.1–0.6)
MONOCYTES # BLD AUTO: 0.58 K/UL — SIGNIFICANT CHANGE UP (ref 0.1–0.6)
MONOCYTES # BLD AUTO: 0.58 K/UL — SIGNIFICANT CHANGE UP (ref 0.1–0.6)
MONOCYTES # BLD AUTO: 0.59 K/UL — SIGNIFICANT CHANGE UP (ref 0.1–0.6)
MONOCYTES # BLD AUTO: 0.6 K/UL — SIGNIFICANT CHANGE UP (ref 0.1–0.6)
MONOCYTES # BLD AUTO: 0.61 K/UL — HIGH (ref 0.1–0.6)
MONOCYTES # BLD AUTO: 0.62 K/UL — HIGH (ref 0.1–0.6)
MONOCYTES # BLD AUTO: 0.63 K/UL — HIGH (ref 0.1–0.6)
MONOCYTES # BLD AUTO: 0.66 K/UL — HIGH (ref 0.1–0.6)
MONOCYTES # BLD AUTO: 0.67 K/UL — HIGH (ref 0.1–0.6)
MONOCYTES # BLD AUTO: 0.69 K/UL — HIGH (ref 0.1–0.6)
MONOCYTES # BLD AUTO: 0.7 K/UL — HIGH (ref 0.1–0.6)
MONOCYTES # BLD AUTO: 0.71 K/UL — HIGH (ref 0.1–0.6)
MONOCYTES # BLD AUTO: 0.72 K/UL — HIGH (ref 0.1–0.6)
MONOCYTES # BLD AUTO: 0.73 K/UL — HIGH (ref 0.1–0.6)
MONOCYTES # BLD AUTO: 0.73 K/UL — HIGH (ref 0.1–0.6)
MONOCYTES # BLD AUTO: 0.75 K/UL — HIGH (ref 0.1–0.6)
MONOCYTES # BLD AUTO: 0.75 K/UL — HIGH (ref 0.1–0.6)
MONOCYTES # BLD AUTO: 0.77 K/UL — HIGH (ref 0.1–0.6)
MONOCYTES # BLD AUTO: 0.78 K/UL — HIGH (ref 0.1–0.6)
MONOCYTES # BLD AUTO: 0.78 K/UL — HIGH (ref 0.1–0.6)
MONOCYTES # BLD AUTO: 0.79 K/UL — HIGH (ref 0.1–0.6)
MONOCYTES # BLD AUTO: 0.81 K/UL — HIGH (ref 0.1–0.6)
MONOCYTES # BLD AUTO: 0.87 K/UL — HIGH (ref 0.1–0.6)
MONOCYTES # BLD AUTO: 0.88 K/UL — HIGH (ref 0.1–0.6)
MONOCYTES # BLD AUTO: 0.9 K/UL — HIGH (ref 0.1–0.6)
MONOCYTES # BLD AUTO: 0.92 K/UL — HIGH (ref 0.1–0.6)
MONOCYTES # BLD AUTO: 0.96 K/UL — HIGH (ref 0.1–0.6)
MONOCYTES NFR BLD AUTO: 12.4 % — HIGH (ref 1.7–9.3)
MONOCYTES NFR BLD AUTO: 3.1 % — SIGNIFICANT CHANGE UP (ref 1.7–9.3)
MONOCYTES NFR BLD AUTO: 3.3 % — SIGNIFICANT CHANGE UP (ref 1.7–9.3)
MONOCYTES NFR BLD AUTO: 3.5 % — SIGNIFICANT CHANGE UP (ref 1.7–9.3)
MONOCYTES NFR BLD AUTO: 3.9 % — SIGNIFICANT CHANGE UP (ref 1.7–9.3)
MONOCYTES NFR BLD AUTO: 4.3 % — SIGNIFICANT CHANGE UP (ref 1.7–9.3)
MONOCYTES NFR BLD AUTO: 4.5 % — SIGNIFICANT CHANGE UP (ref 1.7–9.3)
MONOCYTES NFR BLD AUTO: 4.8 % — SIGNIFICANT CHANGE UP (ref 1.7–9.3)
MONOCYTES NFR BLD AUTO: 4.9 % — SIGNIFICANT CHANGE UP (ref 1.7–9.3)
MONOCYTES NFR BLD AUTO: 5 % — SIGNIFICANT CHANGE UP (ref 1.7–9.3)
MONOCYTES NFR BLD AUTO: 5.1 % — SIGNIFICANT CHANGE UP (ref 1.7–9.3)
MONOCYTES NFR BLD AUTO: 5.2 % — SIGNIFICANT CHANGE UP (ref 1.7–9.3)
MONOCYTES NFR BLD AUTO: 5.3 % — SIGNIFICANT CHANGE UP (ref 1.7–9.3)
MONOCYTES NFR BLD AUTO: 5.3 % — SIGNIFICANT CHANGE UP (ref 1.7–9.3)
MONOCYTES NFR BLD AUTO: 5.4 % — SIGNIFICANT CHANGE UP (ref 1.7–9.3)
MONOCYTES NFR BLD AUTO: 5.5 % — SIGNIFICANT CHANGE UP (ref 1.7–9.3)
MONOCYTES NFR BLD AUTO: 5.6 % — SIGNIFICANT CHANGE UP (ref 1.7–9.3)
MONOCYTES NFR BLD AUTO: 5.7 % — SIGNIFICANT CHANGE UP (ref 1.7–9.3)
MONOCYTES NFR BLD AUTO: 5.7 % — SIGNIFICANT CHANGE UP (ref 1.7–9.3)
MONOCYTES NFR BLD AUTO: 5.8 % — SIGNIFICANT CHANGE UP (ref 1.7–9.3)
MONOCYTES NFR BLD AUTO: 5.8 % — SIGNIFICANT CHANGE UP (ref 1.7–9.3)
MONOCYTES NFR BLD AUTO: 5.9 % — SIGNIFICANT CHANGE UP (ref 1.7–9.3)
MONOCYTES NFR BLD AUTO: 6 % — SIGNIFICANT CHANGE UP (ref 1.7–9.3)
MONOCYTES NFR BLD AUTO: 6.1 % — SIGNIFICANT CHANGE UP (ref 1.7–9.3)
MONOCYTES NFR BLD AUTO: 6.1 % — SIGNIFICANT CHANGE UP (ref 1.7–9.3)
MONOCYTES NFR BLD AUTO: 6.2 % — SIGNIFICANT CHANGE UP (ref 1.7–9.3)
MONOCYTES NFR BLD AUTO: 6.3 % — SIGNIFICANT CHANGE UP (ref 1.7–9.3)
MONOCYTES NFR BLD AUTO: 6.3 % — SIGNIFICANT CHANGE UP (ref 1.7–9.3)
MONOCYTES NFR BLD AUTO: 6.6 % — SIGNIFICANT CHANGE UP (ref 1.7–9.3)
MONOCYTES NFR BLD AUTO: 6.7 % — SIGNIFICANT CHANGE UP (ref 1.7–9.3)
MONOCYTES NFR BLD AUTO: 6.8 % — SIGNIFICANT CHANGE UP (ref 1.7–9.3)
MONOCYTES NFR BLD AUTO: 6.9 % — SIGNIFICANT CHANGE UP (ref 1.7–9.3)
MONOCYTES NFR BLD AUTO: 7.1 % — SIGNIFICANT CHANGE UP (ref 1.7–9.3)
MONOCYTES NFR BLD AUTO: 7.1 % — SIGNIFICANT CHANGE UP (ref 1.7–9.3)
MONOCYTES NFR BLD AUTO: 7.2 % — SIGNIFICANT CHANGE UP (ref 1.7–9.3)
MONOCYTES NFR BLD AUTO: 7.3 % — SIGNIFICANT CHANGE UP (ref 1.7–9.3)
MONOCYTES NFR BLD AUTO: 7.3 % — SIGNIFICANT CHANGE UP (ref 1.7–9.3)
MONOCYTES NFR BLD AUTO: 7.5 % — SIGNIFICANT CHANGE UP (ref 1.7–9.3)
MONOCYTES NFR BLD AUTO: 7.5 % — SIGNIFICANT CHANGE UP (ref 1.7–9.3)
MONOCYTES NFR BLD AUTO: 7.6 % — SIGNIFICANT CHANGE UP (ref 1.7–9.3)
MONOCYTES NFR BLD AUTO: 7.7 % — SIGNIFICANT CHANGE UP (ref 1.7–9.3)
MONOCYTES NFR BLD AUTO: 7.8 % — SIGNIFICANT CHANGE UP (ref 1.7–9.3)
MONOCYTES NFR BLD AUTO: 7.9 % — SIGNIFICANT CHANGE UP (ref 1.7–9.3)
MONOCYTES NFR BLD AUTO: 8.5 % — SIGNIFICANT CHANGE UP (ref 1.7–9.3)
MONOCYTES NFR BLD AUTO: 8.7 % — SIGNIFICANT CHANGE UP (ref 1.7–9.3)
MONOCYTES NFR BLD AUTO: 9.1 % — SIGNIFICANT CHANGE UP (ref 1.7–9.3)
MRSA PCR RESULT.: POSITIVE
NEUTROPHILS # BLD AUTO: 10.61 K/UL — HIGH (ref 1.4–6.5)
NEUTROPHILS # BLD AUTO: 11.25 K/UL — HIGH (ref 1.4–6.5)
NEUTROPHILS # BLD AUTO: 11.43 K/UL — HIGH (ref 1.4–6.5)
NEUTROPHILS # BLD AUTO: 11.52 K/UL — HIGH (ref 1.4–6.5)
NEUTROPHILS # BLD AUTO: 12.64 K/UL — HIGH (ref 1.4–6.5)
NEUTROPHILS # BLD AUTO: 12.65 K/UL — HIGH (ref 1.4–6.5)
NEUTROPHILS # BLD AUTO: 13.28 K/UL — HIGH (ref 1.4–6.5)
NEUTROPHILS # BLD AUTO: 18.38 K/UL — HIGH (ref 1.4–6.5)
NEUTROPHILS # BLD AUTO: 4.2 K/UL — SIGNIFICANT CHANGE UP (ref 1.4–6.5)
NEUTROPHILS # BLD AUTO: 4.41 K/UL — SIGNIFICANT CHANGE UP (ref 1.4–6.5)
NEUTROPHILS # BLD AUTO: 4.47 K/UL — SIGNIFICANT CHANGE UP (ref 1.4–6.5)
NEUTROPHILS # BLD AUTO: 4.51 K/UL — SIGNIFICANT CHANGE UP (ref 1.4–6.5)
NEUTROPHILS # BLD AUTO: 4.52 K/UL — SIGNIFICANT CHANGE UP (ref 1.4–6.5)
NEUTROPHILS # BLD AUTO: 4.56 K/UL — SIGNIFICANT CHANGE UP (ref 1.4–6.5)
NEUTROPHILS # BLD AUTO: 4.87 K/UL — SIGNIFICANT CHANGE UP (ref 1.4–6.5)
NEUTROPHILS # BLD AUTO: 4.91 K/UL — SIGNIFICANT CHANGE UP (ref 1.4–6.5)
NEUTROPHILS # BLD AUTO: 4.93 K/UL — SIGNIFICANT CHANGE UP (ref 1.4–6.5)
NEUTROPHILS # BLD AUTO: 4.95 K/UL — SIGNIFICANT CHANGE UP (ref 1.4–6.5)
NEUTROPHILS # BLD AUTO: 4.98 K/UL — SIGNIFICANT CHANGE UP (ref 1.4–6.5)
NEUTROPHILS # BLD AUTO: 4.98 K/UL — SIGNIFICANT CHANGE UP (ref 1.4–6.5)
NEUTROPHILS # BLD AUTO: 5.16 K/UL — SIGNIFICANT CHANGE UP (ref 1.4–6.5)
NEUTROPHILS # BLD AUTO: 5.16 K/UL — SIGNIFICANT CHANGE UP (ref 1.4–6.5)
NEUTROPHILS # BLD AUTO: 5.24 K/UL — SIGNIFICANT CHANGE UP (ref 1.4–6.5)
NEUTROPHILS # BLD AUTO: 5.28 K/UL — SIGNIFICANT CHANGE UP (ref 1.4–6.5)
NEUTROPHILS # BLD AUTO: 5.35 K/UL — SIGNIFICANT CHANGE UP (ref 1.4–6.5)
NEUTROPHILS # BLD AUTO: 5.36 K/UL — SIGNIFICANT CHANGE UP (ref 1.4–6.5)
NEUTROPHILS # BLD AUTO: 5.47 K/UL — SIGNIFICANT CHANGE UP (ref 1.4–6.5)
NEUTROPHILS # BLD AUTO: 5.53 K/UL — SIGNIFICANT CHANGE UP (ref 1.4–6.5)
NEUTROPHILS # BLD AUTO: 5.58 K/UL — SIGNIFICANT CHANGE UP (ref 1.4–6.5)
NEUTROPHILS # BLD AUTO: 5.62 K/UL — SIGNIFICANT CHANGE UP (ref 1.4–6.5)
NEUTROPHILS # BLD AUTO: 5.66 K/UL — SIGNIFICANT CHANGE UP (ref 1.4–6.5)
NEUTROPHILS # BLD AUTO: 5.68 K/UL — SIGNIFICANT CHANGE UP (ref 1.4–6.5)
NEUTROPHILS # BLD AUTO: 5.68 K/UL — SIGNIFICANT CHANGE UP (ref 1.4–6.5)
NEUTROPHILS # BLD AUTO: 5.71 K/UL — SIGNIFICANT CHANGE UP (ref 1.4–6.5)
NEUTROPHILS # BLD AUTO: 5.72 K/UL — SIGNIFICANT CHANGE UP (ref 1.4–6.5)
NEUTROPHILS # BLD AUTO: 5.75 K/UL — SIGNIFICANT CHANGE UP (ref 1.4–6.5)
NEUTROPHILS # BLD AUTO: 5.82 K/UL — SIGNIFICANT CHANGE UP (ref 1.4–6.5)
NEUTROPHILS # BLD AUTO: 5.83 K/UL — SIGNIFICANT CHANGE UP (ref 1.4–6.5)
NEUTROPHILS # BLD AUTO: 5.85 K/UL — SIGNIFICANT CHANGE UP (ref 1.4–6.5)
NEUTROPHILS # BLD AUTO: 5.94 K/UL — SIGNIFICANT CHANGE UP (ref 1.4–6.5)
NEUTROPHILS # BLD AUTO: 6.02 K/UL — SIGNIFICANT CHANGE UP (ref 1.4–6.5)
NEUTROPHILS # BLD AUTO: 6.07 K/UL — SIGNIFICANT CHANGE UP (ref 1.4–6.5)
NEUTROPHILS # BLD AUTO: 6.27 K/UL — SIGNIFICANT CHANGE UP (ref 1.4–6.5)
NEUTROPHILS # BLD AUTO: 6.28 K/UL — SIGNIFICANT CHANGE UP (ref 1.4–6.5)
NEUTROPHILS # BLD AUTO: 6.3 K/UL — SIGNIFICANT CHANGE UP (ref 1.4–6.5)
NEUTROPHILS # BLD AUTO: 6.36 K/UL — SIGNIFICANT CHANGE UP (ref 1.4–6.5)
NEUTROPHILS # BLD AUTO: 6.43 K/UL — SIGNIFICANT CHANGE UP (ref 1.4–6.5)
NEUTROPHILS # BLD AUTO: 6.47 K/UL — SIGNIFICANT CHANGE UP (ref 1.4–6.5)
NEUTROPHILS # BLD AUTO: 6.52 K/UL — HIGH (ref 1.4–6.5)
NEUTROPHILS # BLD AUTO: 6.52 K/UL — HIGH (ref 1.4–6.5)
NEUTROPHILS # BLD AUTO: 6.66 K/UL — HIGH (ref 1.4–6.5)
NEUTROPHILS # BLD AUTO: 6.92 K/UL — HIGH (ref 1.4–6.5)
NEUTROPHILS # BLD AUTO: 7.04 K/UL — HIGH (ref 1.4–6.5)
NEUTROPHILS # BLD AUTO: 7.05 K/UL — HIGH (ref 1.4–6.5)
NEUTROPHILS # BLD AUTO: 7.17 K/UL — HIGH (ref 1.4–6.5)
NEUTROPHILS # BLD AUTO: 7.26 K/UL — HIGH (ref 1.4–6.5)
NEUTROPHILS # BLD AUTO: 7.59 K/UL — HIGH (ref 1.4–6.5)
NEUTROPHILS # BLD AUTO: 7.93 K/UL — HIGH (ref 1.4–6.5)
NEUTROPHILS # BLD AUTO: 8.15 K/UL — HIGH (ref 1.4–6.5)
NEUTROPHILS # BLD AUTO: 8.53 K/UL — HIGH (ref 1.4–6.5)
NEUTROPHILS # BLD AUTO: 8.69 K/UL — HIGH (ref 1.4–6.5)
NEUTROPHILS # BLD AUTO: 8.7 K/UL — HIGH (ref 1.4–6.5)
NEUTROPHILS # BLD AUTO: 8.74 K/UL — HIGH (ref 1.4–6.5)
NEUTROPHILS # BLD AUTO: 8.93 K/UL — HIGH (ref 1.4–6.5)
NEUTROPHILS # BLD AUTO: 8.95 K/UL — HIGH (ref 1.4–6.5)
NEUTROPHILS # BLD AUTO: 8.99 K/UL — HIGH (ref 1.4–6.5)
NEUTROPHILS # BLD AUTO: 9.18 K/UL — HIGH (ref 1.4–6.5)
NEUTROPHILS # BLD AUTO: 9.76 K/UL — HIGH (ref 1.4–6.5)
NEUTROPHILS NFR BLD AUTO: 51.6 % — SIGNIFICANT CHANGE UP (ref 42.2–75.2)
NEUTROPHILS NFR BLD AUTO: 54.1 % — SIGNIFICANT CHANGE UP (ref 42.2–75.2)
NEUTROPHILS NFR BLD AUTO: 54.9 % — SIGNIFICANT CHANGE UP (ref 42.2–75.2)
NEUTROPHILS NFR BLD AUTO: 56.7 % — SIGNIFICANT CHANGE UP (ref 42.2–75.2)
NEUTROPHILS NFR BLD AUTO: 57.3 % — SIGNIFICANT CHANGE UP (ref 42.2–75.2)
NEUTROPHILS NFR BLD AUTO: 58.4 % — SIGNIFICANT CHANGE UP (ref 42.2–75.2)
NEUTROPHILS NFR BLD AUTO: 58.5 % — SIGNIFICANT CHANGE UP (ref 42.2–75.2)
NEUTROPHILS NFR BLD AUTO: 58.9 % — SIGNIFICANT CHANGE UP (ref 42.2–75.2)
NEUTROPHILS NFR BLD AUTO: 58.9 % — SIGNIFICANT CHANGE UP (ref 42.2–75.2)
NEUTROPHILS NFR BLD AUTO: 59.4 % — SIGNIFICANT CHANGE UP (ref 42.2–75.2)
NEUTROPHILS NFR BLD AUTO: 59.5 % — SIGNIFICANT CHANGE UP (ref 42.2–75.2)
NEUTROPHILS NFR BLD AUTO: 59.5 % — SIGNIFICANT CHANGE UP (ref 42.2–75.2)
NEUTROPHILS NFR BLD AUTO: 59.8 % — SIGNIFICANT CHANGE UP (ref 42.2–75.2)
NEUTROPHILS NFR BLD AUTO: 59.9 % — SIGNIFICANT CHANGE UP (ref 42.2–75.2)
NEUTROPHILS NFR BLD AUTO: 59.9 % — SIGNIFICANT CHANGE UP (ref 42.2–75.2)
NEUTROPHILS NFR BLD AUTO: 60.2 % — SIGNIFICANT CHANGE UP (ref 42.2–75.2)
NEUTROPHILS NFR BLD AUTO: 60.4 % — SIGNIFICANT CHANGE UP (ref 42.2–75.2)
NEUTROPHILS NFR BLD AUTO: 60.7 % — SIGNIFICANT CHANGE UP (ref 42.2–75.2)
NEUTROPHILS NFR BLD AUTO: 60.8 % — SIGNIFICANT CHANGE UP (ref 42.2–75.2)
NEUTROPHILS NFR BLD AUTO: 61.5 % — SIGNIFICANT CHANGE UP (ref 42.2–75.2)
NEUTROPHILS NFR BLD AUTO: 62.2 % — SIGNIFICANT CHANGE UP (ref 42.2–75.2)
NEUTROPHILS NFR BLD AUTO: 62.3 % — SIGNIFICANT CHANGE UP (ref 42.2–75.2)
NEUTROPHILS NFR BLD AUTO: 62.4 % — SIGNIFICANT CHANGE UP (ref 42.2–75.2)
NEUTROPHILS NFR BLD AUTO: 63 % — SIGNIFICANT CHANGE UP (ref 42.2–75.2)
NEUTROPHILS NFR BLD AUTO: 63.6 % — SIGNIFICANT CHANGE UP (ref 42.2–75.2)
NEUTROPHILS NFR BLD AUTO: 63.6 % — SIGNIFICANT CHANGE UP (ref 42.2–75.2)
NEUTROPHILS NFR BLD AUTO: 63.7 % — SIGNIFICANT CHANGE UP (ref 42.2–75.2)
NEUTROPHILS NFR BLD AUTO: 64 % — SIGNIFICANT CHANGE UP (ref 42.2–75.2)
NEUTROPHILS NFR BLD AUTO: 64.2 % — SIGNIFICANT CHANGE UP (ref 42.2–75.2)
NEUTROPHILS NFR BLD AUTO: 64.2 % — SIGNIFICANT CHANGE UP (ref 42.2–75.2)
NEUTROPHILS NFR BLD AUTO: 64.3 % — SIGNIFICANT CHANGE UP (ref 42.2–75.2)
NEUTROPHILS NFR BLD AUTO: 64.5 % — SIGNIFICANT CHANGE UP (ref 42.2–75.2)
NEUTROPHILS NFR BLD AUTO: 64.5 % — SIGNIFICANT CHANGE UP (ref 42.2–75.2)
NEUTROPHILS NFR BLD AUTO: 64.8 % — SIGNIFICANT CHANGE UP (ref 42.2–75.2)
NEUTROPHILS NFR BLD AUTO: 65.8 % — SIGNIFICANT CHANGE UP (ref 42.2–75.2)
NEUTROPHILS NFR BLD AUTO: 65.9 % — SIGNIFICANT CHANGE UP (ref 42.2–75.2)
NEUTROPHILS NFR BLD AUTO: 66.5 % — SIGNIFICANT CHANGE UP (ref 42.2–75.2)
NEUTROPHILS NFR BLD AUTO: 66.8 % — SIGNIFICANT CHANGE UP (ref 42.2–75.2)
NEUTROPHILS NFR BLD AUTO: 67.6 % — SIGNIFICANT CHANGE UP (ref 42.2–75.2)
NEUTROPHILS NFR BLD AUTO: 67.9 % — SIGNIFICANT CHANGE UP (ref 42.2–75.2)
NEUTROPHILS NFR BLD AUTO: 69.9 % — SIGNIFICANT CHANGE UP (ref 42.2–75.2)
NEUTROPHILS NFR BLD AUTO: 70.2 % — SIGNIFICANT CHANGE UP (ref 42.2–75.2)
NEUTROPHILS NFR BLD AUTO: 70.4 % — SIGNIFICANT CHANGE UP (ref 42.2–75.2)
NEUTROPHILS NFR BLD AUTO: 70.6 % — SIGNIFICANT CHANGE UP (ref 42.2–75.2)
NEUTROPHILS NFR BLD AUTO: 71 % — SIGNIFICANT CHANGE UP (ref 42.2–75.2)
NEUTROPHILS NFR BLD AUTO: 71.2 % — SIGNIFICANT CHANGE UP (ref 42.2–75.2)
NEUTROPHILS NFR BLD AUTO: 72 % — SIGNIFICANT CHANGE UP (ref 42.2–75.2)
NEUTROPHILS NFR BLD AUTO: 72.1 % — SIGNIFICANT CHANGE UP (ref 42.2–75.2)
NEUTROPHILS NFR BLD AUTO: 72.3 % — SIGNIFICANT CHANGE UP (ref 42.2–75.2)
NEUTROPHILS NFR BLD AUTO: 72.5 % — SIGNIFICANT CHANGE UP (ref 42.2–75.2)
NEUTROPHILS NFR BLD AUTO: 72.5 % — SIGNIFICANT CHANGE UP (ref 42.2–75.2)
NEUTROPHILS NFR BLD AUTO: 72.6 % — SIGNIFICANT CHANGE UP (ref 42.2–75.2)
NEUTROPHILS NFR BLD AUTO: 75.1 % — SIGNIFICANT CHANGE UP (ref 42.2–75.2)
NEUTROPHILS NFR BLD AUTO: 75.3 % — HIGH (ref 42.2–75.2)
NEUTROPHILS NFR BLD AUTO: 76.3 % — HIGH (ref 42.2–75.2)
NEUTROPHILS NFR BLD AUTO: 76.4 % — HIGH (ref 42.2–75.2)
NEUTROPHILS NFR BLD AUTO: 76.8 % — HIGH (ref 42.2–75.2)
NEUTROPHILS NFR BLD AUTO: 76.9 % — HIGH (ref 42.2–75.2)
NEUTROPHILS NFR BLD AUTO: 78.6 % — HIGH (ref 42.2–75.2)
NEUTROPHILS NFR BLD AUTO: 78.9 % — HIGH (ref 42.2–75.2)
NEUTROPHILS NFR BLD AUTO: 79.1 % — HIGH (ref 42.2–75.2)
NEUTROPHILS NFR BLD AUTO: 79.7 % — HIGH (ref 42.2–75.2)
NEUTROPHILS NFR BLD AUTO: 80 % — HIGH (ref 42.2–75.2)
NEUTROPHILS NFR BLD AUTO: 81.2 % — HIGH (ref 42.2–75.2)
NEUTROPHILS NFR BLD AUTO: 82.1 % — HIGH (ref 42.2–75.2)
NEUTROPHILS NFR BLD AUTO: 82.4 % — HIGH (ref 42.2–75.2)
NEUTROPHILS NFR BLD AUTO: 83.3 % — HIGH (ref 42.2–75.2)
NEUTROPHILS NFR BLD AUTO: 85.2 % — HIGH (ref 42.2–75.2)
NITRITE UR-MCNC: NEGATIVE — SIGNIFICANT CHANGE UP
NITRITE UR-MCNC: POSITIVE
NRBC # BLD: 0 /100 WBCS — SIGNIFICANT CHANGE UP (ref 0–0)
OPIATES UR-MCNC: NEGATIVE — SIGNIFICANT CHANGE UP
OPIATES UR-MCNC: NEGATIVE — SIGNIFICANT CHANGE UP
ORGANISM # SPEC MICROSCOPIC CNT: SIGNIFICANT CHANGE UP
OSMOLALITY SERPL: 305 MOSMOL/KG — HIGH (ref 275–300)
OSMOLALITY UR: 222 MOS/KG — SIGNIFICANT CHANGE UP (ref 50–1400)
OSMOLALITY UR: 275 MOS/KG — SIGNIFICANT CHANGE UP (ref 50–1400)
OSMOLALITY UR: 286 MOS/KG — SIGNIFICANT CHANGE UP (ref 50–1400)
OSMOLALITY UR: 323 MOS/KG — SIGNIFICANT CHANGE UP (ref 50–1400)
OSMOLALITY UR: 336 MOS/KG — SIGNIFICANT CHANGE UP (ref 50–1400)
PCO2 BLDA: 34 MMHG — LOW (ref 38–42)
PCO2 BLDA: 37 MMHG — LOW (ref 38–42)
PCO2 BLDV: 42 MMHG — SIGNIFICANT CHANGE UP (ref 41–51)
PCO2 BLDV: 44 MMHG — SIGNIFICANT CHANGE UP (ref 41–51)
PCO2 BLDV: 44 MMHG — SIGNIFICANT CHANGE UP (ref 41–51)
PCO2 BLDV: 53 MMHG — HIGH (ref 41–51)
PCP SPEC-MCNC: SIGNIFICANT CHANGE UP
PCP UR-MCNC: NEGATIVE — SIGNIFICANT CHANGE UP
PH BLDA: 7.29 — LOW (ref 7.38–7.42)
PH BLDA: 7.34 — LOW (ref 7.38–7.42)
PH BLDV: 7.09 — LOW (ref 7.26–7.43)
PH BLDV: 7.11 — LOW (ref 7.26–7.43)
PH BLDV: 7.13 — LOW (ref 7.26–7.43)
PH BLDV: 7.2 — LOW (ref 7.26–7.43)
PH UR: 6 — SIGNIFICANT CHANGE UP (ref 5–8)
PH UR: 6.5 — SIGNIFICANT CHANGE UP (ref 5–8)
PH UR: 6.6 — SIGNIFICANT CHANGE UP (ref 5–8)
PH UR: 7.5 — SIGNIFICANT CHANGE UP (ref 5–8)
PH UR: 7.5 — SIGNIFICANT CHANGE UP (ref 5–8)
PHOSPHATE 24H UR-MCNC: 16 MG/DL — SIGNIFICANT CHANGE UP
PHOSPHATE SERPL-MCNC: 3.8 MG/DL — SIGNIFICANT CHANGE UP (ref 2.1–4.9)
PHOSPHATE SERPL-MCNC: 3.9 MG/DL — SIGNIFICANT CHANGE UP (ref 2.1–4.9)
PHOSPHATE SERPL-MCNC: 3.9 MG/DL — SIGNIFICANT CHANGE UP (ref 2.1–4.9)
PHOSPHATE SERPL-MCNC: 4 MG/DL — SIGNIFICANT CHANGE UP (ref 2.1–4.9)
PHOSPHATE SERPL-MCNC: 4 MG/DL — SIGNIFICANT CHANGE UP (ref 2.1–4.9)
PHOSPHATE SERPL-MCNC: 4.1 MG/DL — SIGNIFICANT CHANGE UP (ref 2.1–4.9)
PHOSPHATE SERPL-MCNC: 4.1 MG/DL — SIGNIFICANT CHANGE UP (ref 2.1–4.9)
PHOSPHATE SERPL-MCNC: 4.2 MG/DL — SIGNIFICANT CHANGE UP (ref 2.1–4.9)
PHOSPHATE SERPL-MCNC: 4.3 MG/DL — SIGNIFICANT CHANGE UP (ref 2.1–4.9)
PHOSPHATE SERPL-MCNC: 4.3 MG/DL — SIGNIFICANT CHANGE UP (ref 2.1–4.9)
PHOSPHATE SERPL-MCNC: 4.4 MG/DL — SIGNIFICANT CHANGE UP (ref 2.1–4.9)
PHOSPHATE SERPL-MCNC: 4.6 MG/DL — SIGNIFICANT CHANGE UP (ref 2.1–4.9)
PHOSPHATE SERPL-MCNC: 4.6 MG/DL — SIGNIFICANT CHANGE UP (ref 2.1–4.9)
PHOSPHATE SERPL-MCNC: 4.7 MG/DL — SIGNIFICANT CHANGE UP (ref 2.1–4.9)
PHOSPHATE SERPL-MCNC: 4.7 MG/DL — SIGNIFICANT CHANGE UP (ref 2.1–4.9)
PHOSPHATE SERPL-MCNC: 4.8 MG/DL — SIGNIFICANT CHANGE UP (ref 2.1–4.9)
PHOSPHATE SERPL-MCNC: 4.9 MG/DL — SIGNIFICANT CHANGE UP (ref 2.1–4.9)
PHOSPHATE SERPL-MCNC: 5.1 MG/DL — HIGH (ref 2.1–4.9)
PHOSPHATE SERPL-MCNC: 5.2 MG/DL — HIGH (ref 2.1–4.9)
PHOSPHATE SERPL-MCNC: 5.2 MG/DL — HIGH (ref 2.1–4.9)
PHOSPHATE SERPL-MCNC: 5.3 MG/DL — HIGH (ref 2.1–4.9)
PHOSPHATE SERPL-MCNC: 5.6 MG/DL — HIGH (ref 2.1–4.9)
PHOSPHATE SERPL-MCNC: 5.7 MG/DL — HIGH (ref 2.1–4.9)
PHOSPHATE SERPL-MCNC: 5.8 MG/DL — HIGH (ref 2.1–4.9)
PHOSPHATE SERPL-MCNC: 6.1 MG/DL — HIGH (ref 2.1–4.9)
PHOSPHATE SERPL-MCNC: 6.2 MG/DL — HIGH (ref 2.1–4.9)
PHOSPHATE SERPL-MCNC: 6.3 MG/DL — HIGH (ref 2.1–4.9)
PHOSPHATE SERPL-MCNC: 6.4 MG/DL — HIGH (ref 2.1–4.9)
PHOSPHATE SERPL-MCNC: 6.6 MG/DL — HIGH (ref 2.1–4.9)
PHOSPHATE SERPL-MCNC: 6.6 MG/DL — HIGH (ref 2.1–4.9)
PHOSPHATE SERPL-MCNC: 6.7 MG/DL — HIGH (ref 2.1–4.9)
PHOSPHATE SERPL-MCNC: 6.9 MG/DL — HIGH (ref 2.1–4.9)
PHOSPHATE SERPL-MCNC: 7 MG/DL — HIGH (ref 2.1–4.9)
PHOSPHATE SERPL-MCNC: 7.3 MG/DL — HIGH (ref 2.1–4.9)
PHOSPHATE SERPL-MCNC: 7.5 MG/DL — HIGH (ref 2.1–4.9)
PHOSPHATE SERPL-MCNC: 7.8 MG/DL — HIGH (ref 2.1–4.9)
PHOSPHATE SERPL-MCNC: 8 MG/DL — HIGH (ref 2.1–4.9)
PHOSPHATE SERPL-MCNC: 8.3 MG/DL — HIGH (ref 2.1–4.9)
PHOSPHATE SERPL-MCNC: 9.2 MG/DL — HIGH (ref 2.1–4.9)
PLAT MORPH BLD: NORMAL — SIGNIFICANT CHANGE UP
PLATELET # BLD AUTO: 163 K/UL — SIGNIFICANT CHANGE UP (ref 130–400)
PLATELET # BLD AUTO: 166 K/UL — SIGNIFICANT CHANGE UP (ref 130–400)
PLATELET # BLD AUTO: 168 K/UL — SIGNIFICANT CHANGE UP (ref 130–400)
PLATELET # BLD AUTO: 176 K/UL — SIGNIFICANT CHANGE UP (ref 130–400)
PLATELET # BLD AUTO: 182 K/UL — SIGNIFICANT CHANGE UP (ref 130–400)
PLATELET # BLD AUTO: 201 K/UL — SIGNIFICANT CHANGE UP (ref 130–400)
PLATELET # BLD AUTO: 203 K/UL — SIGNIFICANT CHANGE UP (ref 130–400)
PLATELET # BLD AUTO: 204 K/UL — SIGNIFICANT CHANGE UP (ref 130–400)
PLATELET # BLD AUTO: 208 K/UL — SIGNIFICANT CHANGE UP (ref 130–400)
PLATELET # BLD AUTO: 228 K/UL — SIGNIFICANT CHANGE UP (ref 130–400)
PLATELET # BLD AUTO: 229 K/UL — SIGNIFICANT CHANGE UP (ref 130–400)
PLATELET # BLD AUTO: 229 K/UL — SIGNIFICANT CHANGE UP (ref 130–400)
PLATELET # BLD AUTO: 231 K/UL — SIGNIFICANT CHANGE UP (ref 130–400)
PLATELET # BLD AUTO: 231 K/UL — SIGNIFICANT CHANGE UP (ref 130–400)
PLATELET # BLD AUTO: 232 K/UL — SIGNIFICANT CHANGE UP (ref 130–400)
PLATELET # BLD AUTO: 243 K/UL — SIGNIFICANT CHANGE UP (ref 130–400)
PLATELET # BLD AUTO: 253 K/UL — SIGNIFICANT CHANGE UP (ref 130–400)
PLATELET # BLD AUTO: 257 K/UL — SIGNIFICANT CHANGE UP (ref 130–400)
PLATELET # BLD AUTO: 260 K/UL — SIGNIFICANT CHANGE UP (ref 130–400)
PLATELET # BLD AUTO: 262 K/UL — SIGNIFICANT CHANGE UP (ref 130–400)
PLATELET # BLD AUTO: 262 K/UL — SIGNIFICANT CHANGE UP (ref 130–400)
PLATELET # BLD AUTO: 274 K/UL — SIGNIFICANT CHANGE UP (ref 130–400)
PLATELET # BLD AUTO: 276 K/UL — SIGNIFICANT CHANGE UP (ref 130–400)
PLATELET # BLD AUTO: 276 K/UL — SIGNIFICANT CHANGE UP (ref 130–400)
PLATELET # BLD AUTO: 277 K/UL — SIGNIFICANT CHANGE UP (ref 130–400)
PLATELET # BLD AUTO: 280 K/UL — SIGNIFICANT CHANGE UP (ref 130–400)
PLATELET # BLD AUTO: 281 K/UL — SIGNIFICANT CHANGE UP (ref 130–400)
PLATELET # BLD AUTO: 283 K/UL — SIGNIFICANT CHANGE UP (ref 130–400)
PLATELET # BLD AUTO: 285 K/UL — SIGNIFICANT CHANGE UP (ref 130–400)
PLATELET # BLD AUTO: 286 K/UL — SIGNIFICANT CHANGE UP (ref 130–400)
PLATELET # BLD AUTO: 286 K/UL — SIGNIFICANT CHANGE UP (ref 130–400)
PLATELET # BLD AUTO: 287 K/UL — SIGNIFICANT CHANGE UP (ref 130–400)
PLATELET # BLD AUTO: 289 K/UL — SIGNIFICANT CHANGE UP (ref 130–400)
PLATELET # BLD AUTO: 292 K/UL — SIGNIFICANT CHANGE UP (ref 130–400)
PLATELET # BLD AUTO: 292 K/UL — SIGNIFICANT CHANGE UP (ref 130–400)
PLATELET # BLD AUTO: 294 K/UL — SIGNIFICANT CHANGE UP (ref 130–400)
PLATELET # BLD AUTO: 295 K/UL — SIGNIFICANT CHANGE UP (ref 130–400)
PLATELET # BLD AUTO: 297 K/UL — SIGNIFICANT CHANGE UP (ref 130–400)
PLATELET # BLD AUTO: 299 K/UL — SIGNIFICANT CHANGE UP (ref 130–400)
PLATELET # BLD AUTO: 301 K/UL — SIGNIFICANT CHANGE UP (ref 130–400)
PLATELET # BLD AUTO: 306 K/UL — SIGNIFICANT CHANGE UP (ref 130–400)
PLATELET # BLD AUTO: 307 K/UL — SIGNIFICANT CHANGE UP (ref 130–400)
PLATELET # BLD AUTO: 308 K/UL — SIGNIFICANT CHANGE UP (ref 130–400)
PLATELET # BLD AUTO: 312 K/UL — SIGNIFICANT CHANGE UP (ref 130–400)
PLATELET # BLD AUTO: 313 K/UL — SIGNIFICANT CHANGE UP (ref 130–400)
PLATELET # BLD AUTO: 317 K/UL — SIGNIFICANT CHANGE UP (ref 130–400)
PLATELET # BLD AUTO: 318 K/UL — SIGNIFICANT CHANGE UP (ref 130–400)
PLATELET # BLD AUTO: 320 K/UL — SIGNIFICANT CHANGE UP (ref 130–400)
PLATELET # BLD AUTO: 320 K/UL — SIGNIFICANT CHANGE UP (ref 130–400)
PLATELET # BLD AUTO: 326 K/UL — SIGNIFICANT CHANGE UP (ref 130–400)
PLATELET # BLD AUTO: 326 K/UL — SIGNIFICANT CHANGE UP (ref 130–400)
PLATELET # BLD AUTO: 327 K/UL — SIGNIFICANT CHANGE UP (ref 130–400)
PLATELET # BLD AUTO: 328 K/UL — SIGNIFICANT CHANGE UP (ref 130–400)
PLATELET # BLD AUTO: 329 K/UL — SIGNIFICANT CHANGE UP (ref 130–400)
PLATELET # BLD AUTO: 332 K/UL — SIGNIFICANT CHANGE UP (ref 130–400)
PLATELET # BLD AUTO: 332 K/UL — SIGNIFICANT CHANGE UP (ref 130–400)
PLATELET # BLD AUTO: 335 K/UL — SIGNIFICANT CHANGE UP (ref 130–400)
PLATELET # BLD AUTO: 337 K/UL — SIGNIFICANT CHANGE UP (ref 130–400)
PLATELET # BLD AUTO: 344 K/UL — SIGNIFICANT CHANGE UP (ref 130–400)
PLATELET # BLD AUTO: 352 K/UL — SIGNIFICANT CHANGE UP (ref 130–400)
PLATELET # BLD AUTO: 353 K/UL — SIGNIFICANT CHANGE UP (ref 130–400)
PLATELET # BLD AUTO: 356 K/UL — SIGNIFICANT CHANGE UP (ref 130–400)
PLATELET # BLD AUTO: 356 K/UL — SIGNIFICANT CHANGE UP (ref 130–400)
PLATELET # BLD AUTO: 360 K/UL — SIGNIFICANT CHANGE UP (ref 130–400)
PLATELET # BLD AUTO: 361 K/UL — SIGNIFICANT CHANGE UP (ref 130–400)
PLATELET # BLD AUTO: 363 K/UL — SIGNIFICANT CHANGE UP (ref 130–400)
PLATELET # BLD AUTO: 367 K/UL — SIGNIFICANT CHANGE UP (ref 130–400)
PLATELET # BLD AUTO: 369 K/UL — SIGNIFICANT CHANGE UP (ref 130–400)
PLATELET # BLD AUTO: 369 K/UL — SIGNIFICANT CHANGE UP (ref 130–400)
PLATELET # BLD AUTO: 380 K/UL — SIGNIFICANT CHANGE UP (ref 130–400)
PLATELET # BLD AUTO: 383 K/UL — SIGNIFICANT CHANGE UP (ref 130–400)
PLATELET # BLD AUTO: 389 K/UL — SIGNIFICANT CHANGE UP (ref 130–400)
PLATELET # BLD AUTO: 393 K/UL — SIGNIFICANT CHANGE UP (ref 130–400)
PLATELET # BLD AUTO: 395 K/UL — SIGNIFICANT CHANGE UP (ref 130–400)
PLATELET # BLD AUTO: 395 K/UL — SIGNIFICANT CHANGE UP (ref 130–400)
PLATELET # BLD AUTO: 396 K/UL — SIGNIFICANT CHANGE UP (ref 130–400)
PLATELET # BLD AUTO: 397 K/UL — SIGNIFICANT CHANGE UP (ref 130–400)
PLATELET # BLD AUTO: 406 K/UL — HIGH (ref 130–400)
PLATELET # BLD AUTO: 407 K/UL — HIGH (ref 130–400)
PLATELET # BLD AUTO: 409 K/UL — HIGH (ref 130–400)
PLATELET # BLD AUTO: 413 K/UL — HIGH (ref 130–400)
PLATELET # BLD AUTO: 415 K/UL — HIGH (ref 130–400)
PLATELET # BLD AUTO: 419 K/UL — HIGH (ref 130–400)
PLATELET # BLD AUTO: 421 K/UL — HIGH (ref 130–400)
PLATELET # BLD AUTO: 421 K/UL — HIGH (ref 130–400)
PLATELET # BLD AUTO: 435 K/UL — HIGH (ref 130–400)
PLATELET # BLD AUTO: 442 K/UL — HIGH (ref 130–400)
PLATELET # BLD AUTO: 450 K/UL — HIGH (ref 130–400)
PLATELET # BLD AUTO: 455 K/UL — HIGH (ref 130–400)
PLATELET # BLD AUTO: 460 K/UL — HIGH (ref 130–400)
PLATELET # BLD AUTO: 471 K/UL — HIGH (ref 130–400)
PLATELET # BLD AUTO: 514 K/UL — HIGH (ref 130–400)
PLATELET COUNT - ESTIMATE: NORMAL — SIGNIFICANT CHANGE UP
PO2 BLDA: 173 MMHG — HIGH (ref 78–95)
PO2 BLDA: 35 MMHG — CRITICAL LOW (ref 78–95)
PO2 BLDV: 14 MMHG — LOW (ref 20–40)
PO2 BLDV: 40 MMHG — SIGNIFICANT CHANGE UP (ref 20–40)
PO2 BLDV: 41 MMHG — HIGH (ref 20–40)
PO2 BLDV: 61 MMHG — HIGH (ref 20–40)
POIKILOCYTOSIS BLD QL AUTO: SLIGHT — SIGNIFICANT CHANGE UP
POLYCHROMASIA BLD QL SMEAR: SLIGHT — SIGNIFICANT CHANGE UP
POTASSIUM BLDV-SCNC: 5.2 MMOL/L — SIGNIFICANT CHANGE UP (ref 3.3–5.6)
POTASSIUM BLDV-SCNC: 5.2 MMOL/L — SIGNIFICANT CHANGE UP (ref 3.3–5.6)
POTASSIUM BLDV-SCNC: 5.5 MMOL/L — SIGNIFICANT CHANGE UP (ref 3.3–5.6)
POTASSIUM BLDV-SCNC: 6.2 MMOL/L — HIGH (ref 3.3–5.6)
POTASSIUM SERPL-MCNC: 3 MMOL/L — LOW (ref 3.5–5)
POTASSIUM SERPL-MCNC: 3.1 MMOL/L — LOW (ref 3.5–5)
POTASSIUM SERPL-MCNC: 3.4 MMOL/L — LOW (ref 3.5–5)
POTASSIUM SERPL-MCNC: 3.7 MMOL/L — SIGNIFICANT CHANGE UP (ref 3.5–5)
POTASSIUM SERPL-MCNC: 3.8 MMOL/L — SIGNIFICANT CHANGE UP (ref 3.5–5)
POTASSIUM SERPL-MCNC: 3.8 MMOL/L — SIGNIFICANT CHANGE UP (ref 3.5–5)
POTASSIUM SERPL-MCNC: 3.9 MMOL/L — SIGNIFICANT CHANGE UP (ref 3.5–5)
POTASSIUM SERPL-MCNC: 4 MMOL/L — SIGNIFICANT CHANGE UP (ref 3.5–5)
POTASSIUM SERPL-MCNC: 4.1 MMOL/L — SIGNIFICANT CHANGE UP (ref 3.5–5)
POTASSIUM SERPL-MCNC: 4.2 MMOL/L — SIGNIFICANT CHANGE UP (ref 3.5–5)
POTASSIUM SERPL-MCNC: 4.3 MMOL/L — SIGNIFICANT CHANGE UP (ref 3.5–5)
POTASSIUM SERPL-MCNC: 4.4 MMOL/L — SIGNIFICANT CHANGE UP (ref 3.5–5)
POTASSIUM SERPL-MCNC: 4.5 MMOL/L — SIGNIFICANT CHANGE UP (ref 3.5–5)
POTASSIUM SERPL-MCNC: 4.6 MMOL/L — SIGNIFICANT CHANGE UP (ref 3.5–5)
POTASSIUM SERPL-MCNC: 4.7 MMOL/L — SIGNIFICANT CHANGE UP (ref 3.5–5)
POTASSIUM SERPL-MCNC: 4.8 MMOL/L — SIGNIFICANT CHANGE UP (ref 3.5–5)
POTASSIUM SERPL-MCNC: 4.9 MMOL/L — SIGNIFICANT CHANGE UP (ref 3.5–5)
POTASSIUM SERPL-MCNC: 5 MMOL/L — SIGNIFICANT CHANGE UP (ref 3.5–5)
POTASSIUM SERPL-MCNC: 5 MMOL/L — SIGNIFICANT CHANGE UP (ref 3.5–5)
POTASSIUM SERPL-MCNC: 5.1 MMOL/L — HIGH (ref 3.5–5)
POTASSIUM SERPL-MCNC: 5.2 MMOL/L — HIGH (ref 3.5–5)
POTASSIUM SERPL-MCNC: 5.2 MMOL/L — HIGH (ref 3.5–5)
POTASSIUM SERPL-MCNC: 5.3 MMOL/L — HIGH (ref 3.5–5)
POTASSIUM SERPL-MCNC: 5.4 MMOL/L — HIGH (ref 3.5–5)
POTASSIUM SERPL-MCNC: 5.5 MMOL/L — HIGH (ref 3.5–5)
POTASSIUM SERPL-MCNC: 5.5 MMOL/L — HIGH (ref 3.5–5)
POTASSIUM SERPL-MCNC: 5.6 MMOL/L — HIGH (ref 3.5–5)
POTASSIUM SERPL-MCNC: 5.8 MMOL/L — HIGH (ref 3.5–5)
POTASSIUM SERPL-MCNC: 5.9 MMOL/L — HIGH (ref 3.5–5)
POTASSIUM SERPL-MCNC: 5.9 MMOL/L — HIGH (ref 3.5–5)
POTASSIUM SERPL-MCNC: 6 MMOL/L — CRITICAL HIGH (ref 3.5–5)
POTASSIUM SERPL-MCNC: 6.1 MMOL/L — CRITICAL HIGH (ref 3.5–5)
POTASSIUM SERPL-MCNC: 6.2 MMOL/L — CRITICAL HIGH (ref 3.5–5)
POTASSIUM SERPL-MCNC: 6.6 MMOL/L — CRITICAL HIGH (ref 3.5–5)
POTASSIUM SERPL-MCNC: 6.9 MMOL/L — CRITICAL HIGH (ref 3.5–5)
POTASSIUM SERPL-SCNC: 3 MMOL/L — LOW (ref 3.5–5)
POTASSIUM SERPL-SCNC: 3.1 MMOL/L — LOW (ref 3.5–5)
POTASSIUM SERPL-SCNC: 3.4 MMOL/L — LOW (ref 3.5–5)
POTASSIUM SERPL-SCNC: 3.7 MMOL/L — SIGNIFICANT CHANGE UP (ref 3.5–5)
POTASSIUM SERPL-SCNC: 3.8 MMOL/L — SIGNIFICANT CHANGE UP (ref 3.5–5)
POTASSIUM SERPL-SCNC: 3.8 MMOL/L — SIGNIFICANT CHANGE UP (ref 3.5–5)
POTASSIUM SERPL-SCNC: 3.9 MMOL/L — SIGNIFICANT CHANGE UP (ref 3.5–5)
POTASSIUM SERPL-SCNC: 4 MMOL/L — SIGNIFICANT CHANGE UP (ref 3.5–5)
POTASSIUM SERPL-SCNC: 4.1 MMOL/L — SIGNIFICANT CHANGE UP (ref 3.5–5)
POTASSIUM SERPL-SCNC: 4.2 MMOL/L — SIGNIFICANT CHANGE UP (ref 3.5–5)
POTASSIUM SERPL-SCNC: 4.3 MMOL/L — SIGNIFICANT CHANGE UP (ref 3.5–5)
POTASSIUM SERPL-SCNC: 4.4 MMOL/L — SIGNIFICANT CHANGE UP (ref 3.5–5)
POTASSIUM SERPL-SCNC: 4.5 MMOL/L — SIGNIFICANT CHANGE UP (ref 3.5–5)
POTASSIUM SERPL-SCNC: 4.6 MMOL/L — SIGNIFICANT CHANGE UP (ref 3.5–5)
POTASSIUM SERPL-SCNC: 4.7 MMOL/L — SIGNIFICANT CHANGE UP (ref 3.5–5)
POTASSIUM SERPL-SCNC: 4.8 MMOL/L — SIGNIFICANT CHANGE UP (ref 3.5–5)
POTASSIUM SERPL-SCNC: 4.9 MMOL/L — SIGNIFICANT CHANGE UP (ref 3.5–5)
POTASSIUM SERPL-SCNC: 5 MMOL/L — SIGNIFICANT CHANGE UP (ref 3.5–5)
POTASSIUM SERPL-SCNC: 5 MMOL/L — SIGNIFICANT CHANGE UP (ref 3.5–5)
POTASSIUM SERPL-SCNC: 5.1 MMOL/L — HIGH (ref 3.5–5)
POTASSIUM SERPL-SCNC: 5.2 MMOL/L — HIGH (ref 3.5–5)
POTASSIUM SERPL-SCNC: 5.2 MMOL/L — HIGH (ref 3.5–5)
POTASSIUM SERPL-SCNC: 5.3 MMOL/L — HIGH (ref 3.5–5)
POTASSIUM SERPL-SCNC: 5.4 MMOL/L — HIGH (ref 3.5–5)
POTASSIUM SERPL-SCNC: 5.5 MMOL/L — HIGH (ref 3.5–5)
POTASSIUM SERPL-SCNC: 5.5 MMOL/L — HIGH (ref 3.5–5)
POTASSIUM SERPL-SCNC: 5.6 MMOL/L — HIGH (ref 3.5–5)
POTASSIUM SERPL-SCNC: 5.8 MMOL/L — HIGH (ref 3.5–5)
POTASSIUM SERPL-SCNC: 5.9 MMOL/L — HIGH (ref 3.5–5)
POTASSIUM SERPL-SCNC: 5.9 MMOL/L — HIGH (ref 3.5–5)
POTASSIUM SERPL-SCNC: 6 MMOL/L — CRITICAL HIGH (ref 3.5–5)
POTASSIUM SERPL-SCNC: 6.1 MMOL/L — CRITICAL HIGH (ref 3.5–5)
POTASSIUM SERPL-SCNC: 6.2 MMOL/L — CRITICAL HIGH (ref 3.5–5)
POTASSIUM SERPL-SCNC: 6.6 MMOL/L — CRITICAL HIGH (ref 3.5–5)
POTASSIUM SERPL-SCNC: 6.9 MMOL/L — CRITICAL HIGH (ref 3.5–5)
POTASSIUM UR-SCNC: 12 MMOL/L — SIGNIFICANT CHANGE UP
POTASSIUM UR-SCNC: 12 MMOL/L — SIGNIFICANT CHANGE UP
POTASSIUM UR-SCNC: 13 MMOL/L — SIGNIFICANT CHANGE UP
POTASSIUM UR-SCNC: 19 MMOL/L — SIGNIFICANT CHANGE UP
POTASSIUM UR-SCNC: 20 MMOL/L — SIGNIFICANT CHANGE UP
POTASSIUM UR-SCNC: 9 MMOL/L — SIGNIFICANT CHANGE UP
PROINSULIN SERPL-MCNC: 4.7 PMOL/L — SIGNIFICANT CHANGE UP (ref 0–10)
PROLACTIN SERPL-MCNC: 148 NG/ML — HIGH (ref 4.1–18.4)
PROPOXYPHENE QUALITATIVE URINE RESULT: NEGATIVE — SIGNIFICANT CHANGE UP
PROPOXYPHENE QUALITATIVE URINE RESULT: NEGATIVE — SIGNIFICANT CHANGE UP
PROT ?TM UR-MCNC: 205 MG/DLG/24H — SIGNIFICANT CHANGE UP
PROT ?TM UR-MCNC: 263 MG/DLG/24H — SIGNIFICANT CHANGE UP
PROT ?TM UR-MCNC: 289 MG/DLG/24H — SIGNIFICANT CHANGE UP
PROT ?TM UR-MCNC: 411 MG/DLG/24H — SIGNIFICANT CHANGE UP
PROT ?TM UR-MCNC: 507 MG/DLG/24H — SIGNIFICANT CHANGE UP
PROT ?TM UR-MCNC: 535 MG/DLG/24H — SIGNIFICANT CHANGE UP
PROT SERPL-MCNC: 4.1 G/DL — LOW (ref 6–8)
PROT SERPL-MCNC: 4.2 G/DL — LOW (ref 6–8)
PROT SERPL-MCNC: 4.2 G/DL — LOW (ref 6–8)
PROT SERPL-MCNC: 4.3 G/DL — LOW (ref 6–8)
PROT SERPL-MCNC: 4.4 G/DL — LOW (ref 6–8)
PROT SERPL-MCNC: 4.5 G/DL — LOW (ref 6–8)
PROT SERPL-MCNC: 4.6 G/DL — LOW (ref 6–8)
PROT SERPL-MCNC: 4.7 G/DL — LOW (ref 6–8)
PROT SERPL-MCNC: 4.8 G/DL — LOW (ref 6–8)
PROT SERPL-MCNC: 4.9 G/DL — LOW (ref 6–8)
PROT SERPL-MCNC: 5 G/DL — LOW (ref 6–8)
PROT SERPL-MCNC: 5.1 G/DL — LOW (ref 6–8)
PROT SERPL-MCNC: 5.1 G/DL — LOW (ref 6–8)
PROT SERPL-MCNC: 5.2 G/DL — LOW (ref 6–8)
PROT SERPL-MCNC: 5.2 G/DL — LOW (ref 6–8)
PROT SERPL-MCNC: 5.3 G/DL — LOW (ref 6–8)
PROT SERPL-MCNC: 5.3 G/DL — LOW (ref 6–8)
PROT SERPL-MCNC: 5.4 G/DL — LOW (ref 6–8)
PROT SERPL-MCNC: 5.5 G/DL — LOW (ref 6–8)
PROT SERPL-MCNC: 5.6 G/DL — LOW (ref 6–8)
PROT SERPL-MCNC: 5.6 G/DL — LOW (ref 6–8)
PROT SERPL-MCNC: 5.7 G/DL — LOW (ref 6–8)
PROT SERPL-MCNC: 5.9 G/DL — LOW (ref 6–8)
PROT SERPL-MCNC: 5.9 G/DL — LOW (ref 6–8)
PROT UR-MCNC: 100 MG/DL
PROT UR-MCNC: 100 MG/DL
PROT UR-MCNC: >=300
PROT UR-MCNC: >=300 MG/DL
PROT UR-MCNC: ABNORMAL
PROT/CREAT UR-RTO: 12.8 RATIO — HIGH (ref 0–0.2)
PROT/CREAT UR-RTO: 18.4 RATIO — HIGH (ref 0–0.2)
PROT/CREAT UR-RTO: 23 RATIO — HIGH (ref 0–0.2)
PROT/CREAT UR-RTO: 24.2 RATIO — HIGH (ref 0–0.2)
PROT/CREAT UR-RTO: 7 RATIO — HIGH (ref 0–0)
PROTHROM AB SERPL-ACNC: 14.5 SEC — HIGH (ref 9.95–12.87)
PROTHROM AB SERPL-ACNC: 14.6 SEC — HIGH (ref 9.95–12.87)
PROTHROM AB SERPL-ACNC: 15.5 SEC — HIGH (ref 9.95–12.87)
PROTHROM AB SERPL-ACNC: 16.2 SEC — HIGH (ref 9.95–12.87)
PROTHROM AB SERPL-ACNC: 16.7 SEC — HIGH (ref 9.95–12.87)
PROTHROM AB SERPL-ACNC: 17.1 SEC — HIGH (ref 9.95–12.87)
PROTHROM AB SERPL-ACNC: 17.2 SEC — HIGH (ref 9.95–12.87)
PROTHROM AB SERPL-ACNC: 17.4 SEC — HIGH (ref 9.95–12.87)
PROTHROM AB SERPL-ACNC: 19.9 SEC — HIGH (ref 9.95–12.87)
PROTHROM AB SERPL-ACNC: 19.9 SEC — HIGH (ref 9.95–12.87)
PROTHROM AB SERPL-ACNC: 20.1 SEC — HIGH (ref 9.95–12.87)
PROTHROM AB SERPL-ACNC: 20.1 SEC — HIGH (ref 9.95–12.87)
PROTHROM AB SERPL-ACNC: 20.3 SEC — HIGH (ref 9.95–12.87)
PROTHROM AB SERPL-ACNC: 21.4 SEC — HIGH (ref 9.95–12.87)
PROTHROM AB SERPL-ACNC: 21.5 SEC — HIGH (ref 9.95–12.87)
PROTHROM AB SERPL-ACNC: 23.8 SEC — HIGH (ref 9.95–12.87)
PTH-INTACT FLD-MCNC: 209 PG/ML — HIGH (ref 15–65)
PTH-INTACT FLD-MCNC: 71 PG/ML — HIGH (ref 15–65)
PTH-INTACT FLD-MCNC: 99 PG/ML — HIGH (ref 15–65)
RBC # BLD: 2.31 M/UL — LOW (ref 4.7–6.1)
RBC # BLD: 2.43 M/UL — LOW (ref 4.7–6.1)
RBC # BLD: 2.44 M/UL — LOW (ref 4.7–6.1)
RBC # BLD: 2.46 M/UL — LOW (ref 4.7–6.1)
RBC # BLD: 2.47 M/UL — LOW (ref 4.7–6.1)
RBC # BLD: 2.54 M/UL — LOW (ref 4.7–6.1)
RBC # BLD: 2.57 M/UL — LOW (ref 4.7–6.1)
RBC # BLD: 2.6 M/UL — LOW (ref 4.7–6.1)
RBC # BLD: 2.61 M/UL — LOW (ref 4.7–6.1)
RBC # BLD: 2.68 M/UL — LOW (ref 4.7–6.1)
RBC # BLD: 2.69 M/UL — LOW (ref 4.7–6.1)
RBC # BLD: 2.7 M/UL — LOW (ref 4.7–6.1)
RBC # BLD: 2.7 M/UL — LOW (ref 4.7–6.1)
RBC # BLD: 2.71 M/UL — LOW (ref 4.7–6.1)
RBC # BLD: 2.71 M/UL — LOW (ref 4.7–6.1)
RBC # BLD: 2.73 M/UL — LOW (ref 4.7–6.1)
RBC # BLD: 2.73 M/UL — LOW (ref 4.7–6.1)
RBC # BLD: 2.74 M/UL — LOW (ref 4.7–6.1)
RBC # BLD: 2.77 M/UL — LOW (ref 4.7–6.1)
RBC # BLD: 2.77 M/UL — LOW (ref 4.7–6.1)
RBC # BLD: 2.78 M/UL — LOW (ref 4.7–6.1)
RBC # BLD: 2.79 M/UL — LOW (ref 4.7–6.1)
RBC # BLD: 2.8 M/UL — LOW (ref 4.7–6.1)
RBC # BLD: 2.82 M/UL — LOW (ref 4.7–6.1)
RBC # BLD: 2.85 M/UL — LOW (ref 4.7–6.1)
RBC # BLD: 2.85 M/UL — LOW (ref 4.7–6.1)
RBC # BLD: 2.87 M/UL — LOW (ref 4.7–6.1)
RBC # BLD: 2.89 M/UL — LOW (ref 4.7–6.1)
RBC # BLD: 2.9 M/UL — LOW (ref 4.7–6.1)
RBC # BLD: 2.92 M/UL — LOW (ref 4.7–6.1)
RBC # BLD: 2.94 M/UL — LOW (ref 4.7–6.1)
RBC # BLD: 2.95 M/UL — LOW (ref 4.7–6.1)
RBC # BLD: 2.97 M/UL — LOW (ref 4.7–6.1)
RBC # BLD: 2.97 M/UL — LOW (ref 4.7–6.1)
RBC # BLD: 3 M/UL — LOW (ref 4.7–6.1)
RBC # BLD: 3 M/UL — LOW (ref 4.7–6.1)
RBC # BLD: 3.02 M/UL — LOW (ref 4.7–6.1)
RBC # BLD: 3.02 M/UL — LOW (ref 4.7–6.1)
RBC # BLD: 3.04 M/UL — LOW (ref 4.7–6.1)
RBC # BLD: 3.05 M/UL — LOW (ref 4.7–6.1)
RBC # BLD: 3.05 M/UL — LOW (ref 4.7–6.1)
RBC # BLD: 3.06 M/UL — LOW (ref 4.7–6.1)
RBC # BLD: 3.09 M/UL — LOW (ref 4.7–6.1)
RBC # BLD: 3.1 M/UL — LOW (ref 4.7–6.1)
RBC # BLD: 3.11 M/UL — LOW (ref 4.7–6.1)
RBC # BLD: 3.15 M/UL — LOW (ref 4.7–6.1)
RBC # BLD: 3.16 M/UL — LOW (ref 4.7–6.1)
RBC # BLD: 3.17 M/UL — LOW (ref 4.7–6.1)
RBC # BLD: 3.18 M/UL — LOW (ref 4.7–6.1)
RBC # BLD: 3.18 M/UL — LOW (ref 4.7–6.1)
RBC # BLD: 3.19 M/UL — LOW (ref 4.7–6.1)
RBC # BLD: 3.2 M/UL — LOW (ref 4.7–6.1)
RBC # BLD: 3.2 M/UL — LOW (ref 4.7–6.1)
RBC # BLD: 3.22 M/UL — LOW (ref 4.7–6.1)
RBC # BLD: 3.23 M/UL — LOW (ref 4.7–6.1)
RBC # BLD: 3.24 M/UL — LOW (ref 4.7–6.1)
RBC # BLD: 3.24 M/UL — LOW (ref 4.7–6.1)
RBC # BLD: 3.25 M/UL — LOW (ref 4.7–6.1)
RBC # BLD: 3.27 M/UL — LOW (ref 4.7–6.1)
RBC # BLD: 3.28 M/UL — LOW (ref 4.7–6.1)
RBC # BLD: 3.28 M/UL — LOW (ref 4.7–6.1)
RBC # BLD: 3.29 M/UL — LOW (ref 4.7–6.1)
RBC # BLD: 3.3 M/UL — LOW (ref 4.7–6.1)
RBC # BLD: 3.31 M/UL — LOW (ref 4.7–6.1)
RBC # BLD: 3.33 M/UL — LOW (ref 4.7–6.1)
RBC # BLD: 3.33 M/UL — LOW (ref 4.7–6.1)
RBC # BLD: 3.36 M/UL — LOW (ref 4.7–6.1)
RBC # BLD: 3.36 M/UL — LOW (ref 4.7–6.1)
RBC # BLD: 3.41 M/UL — LOW (ref 4.7–6.1)
RBC # BLD: 3.43 M/UL — LOW (ref 4.7–6.1)
RBC # BLD: 3.45 M/UL — LOW (ref 4.7–6.1)
RBC # BLD: 3.49 M/UL — LOW (ref 4.7–6.1)
RBC # BLD: 3.5 M/UL — LOW (ref 4.7–6.1)
RBC # BLD: 3.51 M/UL — LOW (ref 4.7–6.1)
RBC # BLD: 3.52 M/UL — LOW (ref 4.7–6.1)
RBC # BLD: 3.53 M/UL — LOW (ref 4.7–6.1)
RBC # BLD: 3.54 M/UL — LOW (ref 4.7–6.1)
RBC # BLD: 3.56 M/UL — LOW (ref 4.7–6.1)
RBC # BLD: 3.6 M/UL — LOW (ref 4.7–6.1)
RBC # BLD: 3.6 M/UL — LOW (ref 4.7–6.1)
RBC # BLD: 3.63 M/UL — LOW (ref 4.7–6.1)
RBC # BLD: 3.64 M/UL — LOW (ref 4.7–6.1)
RBC # BLD: 3.65 M/UL — LOW (ref 4.7–6.1)
RBC # BLD: 3.71 M/UL — LOW (ref 4.7–6.1)
RBC # BLD: 3.75 M/UL — LOW (ref 4.7–6.1)
RBC # BLD: 3.81 M/UL — LOW (ref 4.7–6.1)
RBC # BLD: 3.85 M/UL — LOW (ref 4.7–6.1)
RBC # FLD: 14.3 % — SIGNIFICANT CHANGE UP (ref 11.5–14.5)
RBC # FLD: 14.3 % — SIGNIFICANT CHANGE UP (ref 11.5–14.5)
RBC # FLD: 14.5 % — SIGNIFICANT CHANGE UP (ref 11.5–14.5)
RBC # FLD: 14.6 % — HIGH (ref 11.5–14.5)
RBC # FLD: 14.7 % — HIGH (ref 11.5–14.5)
RBC # FLD: 14.8 % — HIGH (ref 11.5–14.5)
RBC # FLD: 14.9 % — HIGH (ref 11.5–14.5)
RBC # FLD: 14.9 % — HIGH (ref 11.5–14.5)
RBC # FLD: 15 % — HIGH (ref 11.5–14.5)
RBC # FLD: 15 % — HIGH (ref 11.5–14.5)
RBC # FLD: 15.1 % — HIGH (ref 11.5–14.5)
RBC # FLD: 15.2 % — HIGH (ref 11.5–14.5)
RBC # FLD: 15.3 % — HIGH (ref 11.5–14.5)
RBC # FLD: 15.4 % — HIGH (ref 11.5–14.5)
RBC # FLD: 15.5 % — HIGH (ref 11.5–14.5)
RBC # FLD: 15.5 % — HIGH (ref 11.5–14.5)
RBC # FLD: 15.6 % — HIGH (ref 11.5–14.5)
RBC # FLD: 15.7 % — HIGH (ref 11.5–14.5)
RBC # FLD: 15.7 % — HIGH (ref 11.5–14.5)
RBC # FLD: 15.9 % — HIGH (ref 11.5–14.5)
RBC # FLD: 16 % — HIGH (ref 11.5–14.5)
RBC # FLD: 16.1 % — HIGH (ref 11.5–14.5)
RBC # FLD: 16.1 % — HIGH (ref 11.5–14.5)
RBC # FLD: 16.3 % — HIGH (ref 11.5–14.5)
RBC # FLD: 16.7 % — HIGH (ref 11.5–14.5)
RBC # FLD: 17.4 % — HIGH (ref 11.5–14.5)
RBC # FLD: 17.4 % — HIGH (ref 11.5–14.5)
RBC # FLD: 17.7 % — HIGH (ref 11.5–14.5)
RBC # FLD: 17.8 % — HIGH (ref 11.5–14.5)
RBC # FLD: 17.8 % — HIGH (ref 11.5–14.5)
RBC # FLD: 18.1 % — HIGH (ref 11.5–14.5)
RBC # FLD: 18.5 % — HIGH (ref 11.5–14.5)
RBC # FLD: 18.5 % — HIGH (ref 11.5–14.5)
RBC # FLD: 18.6 % — HIGH (ref 11.5–14.5)
RBC # FLD: 18.7 % — HIGH (ref 11.5–14.5)
RBC # FLD: 18.8 % — HIGH (ref 11.5–14.5)
RBC # FLD: 19 % — HIGH (ref 11.5–14.5)
RBC # FLD: 19.2 % — HIGH (ref 11.5–14.5)
RBC # FLD: 19.2 % — HIGH (ref 11.5–14.5)
RBC # FLD: 19.3 % — HIGH (ref 11.5–14.5)
RBC # FLD: 19.3 % — HIGH (ref 11.5–14.5)
RBC # FLD: 19.5 % — HIGH (ref 11.5–14.5)
RBC # FLD: 19.6 % — HIGH (ref 11.5–14.5)
RBC # FLD: 19.7 % — HIGH (ref 11.5–14.5)
RBC # FLD: 19.9 % — HIGH (ref 11.5–14.5)
RBC # FLD: 20 % — HIGH (ref 11.5–14.5)
RBC # FLD: 20 % — HIGH (ref 11.5–14.5)
RBC # FLD: 20.2 % — HIGH (ref 11.5–14.5)
RBC # FLD: 20.4 % — HIGH (ref 11.5–14.5)
RBC # FLD: 20.6 % — HIGH (ref 11.5–14.5)
RBC BLD AUTO: ABNORMAL
RBC BLD AUTO: ABNORMAL
RBC BLD AUTO: NORMAL — SIGNIFICANT CHANGE UP
RBC CASTS # UR COMP ASSIST: 1 /HPF — SIGNIFICANT CHANGE UP (ref 0–4)
RBC CASTS # UR COMP ASSIST: ABNORMAL /HPF
RBC CASTS # UR COMP ASSIST: SIGNIFICANT CHANGE UP /HPF
RBC CASTS # UR COMP ASSIST: SIGNIFICANT CHANGE UP /HPF
RETICS #: 34.5 K/UL — SIGNIFICANT CHANGE UP (ref 25–125)
RETICS/RBC NFR: 1.4 % — SIGNIFICANT CHANGE UP (ref 0.5–1.5)
SALICYLATES SERPL-MCNC: <0.3 MG/DL — LOW (ref 4–30)
SALICYLATES SERPL-MCNC: <0.3 MG/DL — LOW (ref 4–30)
SAO2 % BLDA: 67 % — CRITICAL LOW (ref 94–98)
SAO2 % BLDA: 99 % — HIGH (ref 94–98)
SAO2 % BLDV: 12 % — SIGNIFICANT CHANGE UP
SAO2 % BLDV: 68 % — SIGNIFICANT CHANGE UP
SAO2 % BLDV: 68 % — SIGNIFICANT CHANGE UP
SAO2 % BLDV: 85 % — SIGNIFICANT CHANGE UP
SODIUM SERPL-SCNC: 130 MMOL/L — LOW (ref 135–146)
SODIUM SERPL-SCNC: 132 MMOL/L — LOW (ref 135–146)
SODIUM SERPL-SCNC: 133 MMOL/L — LOW (ref 135–146)
SODIUM SERPL-SCNC: 134 MMOL/L — LOW (ref 135–146)
SODIUM SERPL-SCNC: 135 MMOL/L — SIGNIFICANT CHANGE UP (ref 135–146)
SODIUM SERPL-SCNC: 136 MMOL/L — SIGNIFICANT CHANGE UP (ref 135–146)
SODIUM SERPL-SCNC: 137 MMOL/L — SIGNIFICANT CHANGE UP (ref 135–146)
SODIUM SERPL-SCNC: 138 MMOL/L — SIGNIFICANT CHANGE UP (ref 135–146)
SODIUM SERPL-SCNC: 139 MMOL/L — SIGNIFICANT CHANGE UP (ref 135–146)
SODIUM SERPL-SCNC: 140 MMOL/L — SIGNIFICANT CHANGE UP (ref 135–146)
SODIUM SERPL-SCNC: 141 MMOL/L — SIGNIFICANT CHANGE UP (ref 135–146)
SODIUM SERPL-SCNC: 142 MMOL/L — SIGNIFICANT CHANGE UP (ref 135–146)
SODIUM SERPL-SCNC: 144 MMOL/L — SIGNIFICANT CHANGE UP (ref 135–146)
SODIUM UR-SCNC: 31 MMOL/L — SIGNIFICANT CHANGE UP
SODIUM UR-SCNC: 33 MMOL/L — SIGNIFICANT CHANGE UP
SODIUM UR-SCNC: 33 MMOL/L — SIGNIFICANT CHANGE UP
SODIUM UR-SCNC: 38 MMOL/L — SIGNIFICANT CHANGE UP
SODIUM UR-SCNC: 45 MMOL/L — SIGNIFICANT CHANGE UP
SODIUM UR-SCNC: 60 MMOL/L — SIGNIFICANT CHANGE UP
SODIUM UR-SCNC: 64 MMOL/L — SIGNIFICANT CHANGE UP
SODIUM UR-SCNC: 83 MMOL/L — SIGNIFICANT CHANGE UP
SODIUM UR-SCNC: 96 MMOL/L — SIGNIFICANT CHANGE UP
SP GR SPEC: 1.01 — LOW (ref 1.01–1.02)
SP GR SPEC: 1.01 — SIGNIFICANT CHANGE UP (ref 1.01–1.03)
SP GR SPEC: 1.01 — SIGNIFICANT CHANGE UP (ref 1.01–1.03)
SP GR SPEC: 1.02 — SIGNIFICANT CHANGE UP (ref 1.01–1.03)
SPECIMEN SOURCE: SIGNIFICANT CHANGE UP
SULFONYLUREA SERPL-MCNC: SIGNIFICANT CHANGE UP
SURGICAL PATHOLOGY STUDY: SIGNIFICANT CHANGE UP
T3 SERPL-MCNC: 54 NG/DL — LOW (ref 80–200)
T4 AB SER-ACNC: 1.3 UG/DL — LOW (ref 4.6–12)
T4 FREE SERPL-MCNC: 0.3 NG/DL — LOW (ref 0.9–1.8)
THC UR QL: NEGATIVE — SIGNIFICANT CHANGE UP
THYROGLOB AB FLD IA-ACNC: <20 IU/ML — SIGNIFICANT CHANGE UP
THYROGLOB AB FLD IA-ACNC: <20 IU/ML — SIGNIFICANT CHANGE UP
THYROGLOB AB SERPL-ACNC: <20 IU/ML — SIGNIFICANT CHANGE UP
THYROGLOB AB SERPL-ACNC: <20 IU/ML — SIGNIFICANT CHANGE UP
THYROPEROXIDASE AB SERPL-ACNC: <10 IU/ML — SIGNIFICANT CHANGE UP
TIBC SERPL-MCNC: 77 UG/DL — LOW (ref 220–430)
TIBC SERPL-MCNC: 84 UG/DL — LOW (ref 220–430)
TIBC SERPL-MCNC: 89 UG/DL — LOW (ref 220–430)
TRANSFERRIN SERPL-MCNC: 69 MG/DL — LOW (ref 200–360)
TROPONIN T SERPL-MCNC: 0.08 NG/ML — CRITICAL HIGH
TROPONIN T SERPL-MCNC: 0.09 NG/ML — CRITICAL HIGH
TROPONIN T SERPL-MCNC: 0.09 NG/ML — CRITICAL HIGH
TROPONIN T SERPL-MCNC: 0.11 NG/ML — CRITICAL HIGH
TSH SERPL-MCNC: 104 UIU/ML — HIGH (ref 0.27–4.2)
TSH SERPL-MCNC: 62.5 UIU/ML — HIGH (ref 0.27–4.2)
TSH SERPL-MCNC: 70.1 UIU/ML — HIGH (ref 0.27–4.2)
TSH SERPL-MCNC: 72.8 UIU/ML — HIGH (ref 0.27–4.2)
TSH SERPL-MCNC: 87.6 UIU/ML — HIGH (ref 0.27–4.2)
TSH SERPL-MCNC: 93.1 UIU/ML — HIGH (ref 0.27–4.2)
UIBC SERPL-MCNC: 38 UG/DL — LOW (ref 110–370)
UIBC SERPL-MCNC: 47 UG/DL — LOW (ref 110–370)
UIBC SERPL-MCNC: 62 UG/DL — LOW (ref 110–370)
UROBILINOGEN FLD QL: 0.2 MG/DL — SIGNIFICANT CHANGE UP (ref 0.2–0.2)
UROBILINOGEN FLD QL: 0.2 — SIGNIFICANT CHANGE UP (ref 0.2–0.2)
UROBILINOGEN FLD QL: SIGNIFICANT CHANGE UP
UUN UR-MCNC: 152 MG/DL — SIGNIFICANT CHANGE UP
UUN UR-MCNC: 286 MG/DL — SIGNIFICANT CHANGE UP
UUN UR-MCNC: 289 MG/DL — SIGNIFICANT CHANGE UP
UUN UR-MCNC: 481 MG/DL — SIGNIFICANT CHANGE UP
VANCOMYCIN FLD-MCNC: 13.6 UG/ML — HIGH (ref 5–10)
VANCOMYCIN FLD-MCNC: 17.2 UG/ML — HIGH (ref 5–10)
VANCOMYCIN FLD-MCNC: 25.2 UG/ML — HIGH (ref 5–10)
VANCOMYCIN FLD-MCNC: 7 UG/ML — SIGNIFICANT CHANGE UP (ref 5–10)
VANCOMYCIN FLD-MCNC: 7.2 UG/ML — SIGNIFICANT CHANGE UP (ref 5–10)
VANCOMYCIN TROUGH SERPL-MCNC: 15.3 UG/ML — HIGH (ref 5–10)
VANCOMYCIN TROUGH SERPL-MCNC: 25.7 UG/ML — HIGH (ref 5–10)
VANCOMYCIN TROUGH SERPL-MCNC: 26.8 UG/ML — HIGH (ref 5–10)
VANCOMYCIN TROUGH SERPL-MCNC: 31 UG/ML — HIGH (ref 5–10)
VANCOMYCIN TROUGH SERPL-MCNC: 8.1 UG/ML — SIGNIFICANT CHANGE UP (ref 5–10)
VIT B12 SERPL-MCNC: 1335 PG/ML — HIGH (ref 232–1245)
VIT B12 SERPL-MCNC: 1507 PG/ML — HIGH (ref 232–1245)
WBC # BLD: 10.03 K/UL — SIGNIFICANT CHANGE UP (ref 4.8–10.8)
WBC # BLD: 10.03 K/UL — SIGNIFICANT CHANGE UP (ref 4.8–10.8)
WBC # BLD: 10.08 K/UL — SIGNIFICANT CHANGE UP (ref 4.8–10.8)
WBC # BLD: 10.11 K/UL — SIGNIFICANT CHANGE UP (ref 4.8–10.8)
WBC # BLD: 10.15 K/UL — SIGNIFICANT CHANGE UP (ref 4.8–10.8)
WBC # BLD: 10.16 K/UL — SIGNIFICANT CHANGE UP (ref 4.8–10.8)
WBC # BLD: 10.19 K/UL — SIGNIFICANT CHANGE UP (ref 4.8–10.8)
WBC # BLD: 10.22 K/UL — SIGNIFICANT CHANGE UP (ref 4.8–10.8)
WBC # BLD: 10.52 K/UL — SIGNIFICANT CHANGE UP (ref 4.8–10.8)
WBC # BLD: 10.56 K/UL — SIGNIFICANT CHANGE UP (ref 4.8–10.8)
WBC # BLD: 10.61 K/UL — SIGNIFICANT CHANGE UP (ref 4.8–10.8)
WBC # BLD: 10.87 K/UL — HIGH (ref 4.8–10.8)
WBC # BLD: 10.89 K/UL — HIGH (ref 4.8–10.8)
WBC # BLD: 11.02 K/UL — HIGH (ref 4.8–10.8)
WBC # BLD: 11.25 K/UL — HIGH (ref 4.8–10.8)
WBC # BLD: 11.33 K/UL — HIGH (ref 4.8–10.8)
WBC # BLD: 11.34 K/UL — HIGH (ref 4.8–10.8)
WBC # BLD: 11.36 K/UL — HIGH (ref 4.8–10.8)
WBC # BLD: 11.39 K/UL — HIGH (ref 4.8–10.8)
WBC # BLD: 11.51 K/UL — HIGH (ref 4.8–10.8)
WBC # BLD: 11.53 K/UL — HIGH (ref 4.8–10.8)
WBC # BLD: 11.61 K/UL — HIGH (ref 4.8–10.8)
WBC # BLD: 11.75 K/UL — HIGH (ref 4.8–10.8)
WBC # BLD: 11.77 K/UL — HIGH (ref 4.8–10.8)
WBC # BLD: 11.82 K/UL — HIGH (ref 4.8–10.8)
WBC # BLD: 12.06 K/UL — HIGH (ref 4.8–10.8)
WBC # BLD: 12.54 K/UL — HIGH (ref 4.8–10.8)
WBC # BLD: 12.66 K/UL — HIGH (ref 4.8–10.8)
WBC # BLD: 12.78 K/UL — HIGH (ref 4.8–10.8)
WBC # BLD: 13.16 K/UL — HIGH (ref 4.8–10.8)
WBC # BLD: 13.85 K/UL — HIGH (ref 4.8–10.8)
WBC # BLD: 14.01 K/UL — HIGH (ref 4.8–10.8)
WBC # BLD: 14.34 K/UL — HIGH (ref 4.8–10.8)
WBC # BLD: 14.62 K/UL — HIGH (ref 4.8–10.8)
WBC # BLD: 15.35 K/UL — HIGH (ref 4.8–10.8)
WBC # BLD: 15.96 K/UL — HIGH (ref 4.8–10.8)
WBC # BLD: 15.96 K/UL — HIGH (ref 4.8–10.8)
WBC # BLD: 19.29 K/UL — HIGH (ref 4.8–10.8)
WBC # BLD: 21.57 K/UL — HIGH (ref 4.8–10.8)
WBC # BLD: 7.11 K/UL — SIGNIFICANT CHANGE UP (ref 4.8–10.8)
WBC # BLD: 7.17 K/UL — SIGNIFICANT CHANGE UP (ref 4.8–10.8)
WBC # BLD: 7.59 K/UL — SIGNIFICANT CHANGE UP (ref 4.8–10.8)
WBC # BLD: 7.65 K/UL — SIGNIFICANT CHANGE UP (ref 4.8–10.8)
WBC # BLD: 7.68 K/UL — SIGNIFICANT CHANGE UP (ref 4.8–10.8)
WBC # BLD: 7.77 K/UL — SIGNIFICANT CHANGE UP (ref 4.8–10.8)
WBC # BLD: 7.81 K/UL — SIGNIFICANT CHANGE UP (ref 4.8–10.8)
WBC # BLD: 7.82 K/UL — SIGNIFICANT CHANGE UP (ref 4.8–10.8)
WBC # BLD: 7.83 K/UL — SIGNIFICANT CHANGE UP (ref 4.8–10.8)
WBC # BLD: 7.94 K/UL — SIGNIFICANT CHANGE UP (ref 4.8–10.8)
WBC # BLD: 8.02 K/UL — SIGNIFICANT CHANGE UP (ref 4.8–10.8)
WBC # BLD: 8.04 K/UL — SIGNIFICANT CHANGE UP (ref 4.8–10.8)
WBC # BLD: 8.09 K/UL — SIGNIFICANT CHANGE UP (ref 4.8–10.8)
WBC # BLD: 8.13 K/UL — SIGNIFICANT CHANGE UP (ref 4.8–10.8)
WBC # BLD: 8.18 K/UL — SIGNIFICANT CHANGE UP (ref 4.8–10.8)
WBC # BLD: 8.19 K/UL — SIGNIFICANT CHANGE UP (ref 4.8–10.8)
WBC # BLD: 8.27 K/UL — SIGNIFICANT CHANGE UP (ref 4.8–10.8)
WBC # BLD: 8.32 K/UL — SIGNIFICANT CHANGE UP (ref 4.8–10.8)
WBC # BLD: 8.5 K/UL — SIGNIFICANT CHANGE UP (ref 4.8–10.8)
WBC # BLD: 8.59 K/UL — SIGNIFICANT CHANGE UP (ref 4.8–10.8)
WBC # BLD: 8.63 K/UL — SIGNIFICANT CHANGE UP (ref 4.8–10.8)
WBC # BLD: 8.69 K/UL — SIGNIFICANT CHANGE UP (ref 4.8–10.8)
WBC # BLD: 8.81 K/UL — SIGNIFICANT CHANGE UP (ref 4.8–10.8)
WBC # BLD: 8.82 K/UL — SIGNIFICANT CHANGE UP (ref 4.8–10.8)
WBC # BLD: 8.83 K/UL — SIGNIFICANT CHANGE UP (ref 4.8–10.8)
WBC # BLD: 8.85 K/UL — SIGNIFICANT CHANGE UP (ref 4.8–10.8)
WBC # BLD: 8.86 K/UL — SIGNIFICANT CHANGE UP (ref 4.8–10.8)
WBC # BLD: 8.96 K/UL — SIGNIFICANT CHANGE UP (ref 4.8–10.8)
WBC # BLD: 9.02 K/UL — SIGNIFICANT CHANGE UP (ref 4.8–10.8)
WBC # BLD: 9.06 K/UL — SIGNIFICANT CHANGE UP (ref 4.8–10.8)
WBC # BLD: 9.06 K/UL — SIGNIFICANT CHANGE UP (ref 4.8–10.8)
WBC # BLD: 9.07 K/UL — SIGNIFICANT CHANGE UP (ref 4.8–10.8)
WBC # BLD: 9.13 K/UL — SIGNIFICANT CHANGE UP (ref 4.8–10.8)
WBC # BLD: 9.14 K/UL — SIGNIFICANT CHANGE UP (ref 4.8–10.8)
WBC # BLD: 9.26 K/UL — SIGNIFICANT CHANGE UP (ref 4.8–10.8)
WBC # BLD: 9.29 K/UL — SIGNIFICANT CHANGE UP (ref 4.8–10.8)
WBC # BLD: 9.3 K/UL — SIGNIFICANT CHANGE UP (ref 4.8–10.8)
WBC # BLD: 9.32 K/UL — SIGNIFICANT CHANGE UP (ref 4.8–10.8)
WBC # BLD: 9.32 K/UL — SIGNIFICANT CHANGE UP (ref 4.8–10.8)
WBC # BLD: 9.39 K/UL — SIGNIFICANT CHANGE UP (ref 4.8–10.8)
WBC # BLD: 9.42 K/UL — SIGNIFICANT CHANGE UP (ref 4.8–10.8)
WBC # BLD: 9.52 K/UL — SIGNIFICANT CHANGE UP (ref 4.8–10.8)
WBC # BLD: 9.53 K/UL — SIGNIFICANT CHANGE UP (ref 4.8–10.8)
WBC # BLD: 9.55 K/UL — SIGNIFICANT CHANGE UP (ref 4.8–10.8)
WBC # BLD: 9.65 K/UL — SIGNIFICANT CHANGE UP (ref 4.8–10.8)
WBC # BLD: 9.78 K/UL — SIGNIFICANT CHANGE UP (ref 4.8–10.8)
WBC # BLD: 9.8 K/UL — SIGNIFICANT CHANGE UP (ref 4.8–10.8)
WBC # BLD: 9.85 K/UL — SIGNIFICANT CHANGE UP (ref 4.8–10.8)
WBC # BLD: 9.9 K/UL — SIGNIFICANT CHANGE UP (ref 4.8–10.8)
WBC # BLD: 9.9 K/UL — SIGNIFICANT CHANGE UP (ref 4.8–10.8)
WBC # BLD: 9.93 K/UL — SIGNIFICANT CHANGE UP (ref 4.8–10.8)
WBC # BLD: 9.98 K/UL — SIGNIFICANT CHANGE UP (ref 4.8–10.8)
WBC # BLD: 9.99 K/UL — SIGNIFICANT CHANGE UP (ref 4.8–10.8)
WBC # FLD AUTO: 10.03 K/UL — SIGNIFICANT CHANGE UP (ref 4.8–10.8)
WBC # FLD AUTO: 10.03 K/UL — SIGNIFICANT CHANGE UP (ref 4.8–10.8)
WBC # FLD AUTO: 10.08 K/UL — SIGNIFICANT CHANGE UP (ref 4.8–10.8)
WBC # FLD AUTO: 10.11 K/UL — SIGNIFICANT CHANGE UP (ref 4.8–10.8)
WBC # FLD AUTO: 10.15 K/UL — SIGNIFICANT CHANGE UP (ref 4.8–10.8)
WBC # FLD AUTO: 10.16 K/UL — SIGNIFICANT CHANGE UP (ref 4.8–10.8)
WBC # FLD AUTO: 10.19 K/UL — SIGNIFICANT CHANGE UP (ref 4.8–10.8)
WBC # FLD AUTO: 10.22 K/UL — SIGNIFICANT CHANGE UP (ref 4.8–10.8)
WBC # FLD AUTO: 10.52 K/UL — SIGNIFICANT CHANGE UP (ref 4.8–10.8)
WBC # FLD AUTO: 10.56 K/UL — SIGNIFICANT CHANGE UP (ref 4.8–10.8)
WBC # FLD AUTO: 10.61 K/UL — SIGNIFICANT CHANGE UP (ref 4.8–10.8)
WBC # FLD AUTO: 10.87 K/UL — HIGH (ref 4.8–10.8)
WBC # FLD AUTO: 10.89 K/UL — HIGH (ref 4.8–10.8)
WBC # FLD AUTO: 11.02 K/UL — HIGH (ref 4.8–10.8)
WBC # FLD AUTO: 11.25 K/UL — HIGH (ref 4.8–10.8)
WBC # FLD AUTO: 11.33 K/UL — HIGH (ref 4.8–10.8)
WBC # FLD AUTO: 11.34 K/UL — HIGH (ref 4.8–10.8)
WBC # FLD AUTO: 11.36 K/UL — HIGH (ref 4.8–10.8)
WBC # FLD AUTO: 11.39 K/UL — HIGH (ref 4.8–10.8)
WBC # FLD AUTO: 11.51 K/UL — HIGH (ref 4.8–10.8)
WBC # FLD AUTO: 11.53 K/UL — HIGH (ref 4.8–10.8)
WBC # FLD AUTO: 11.61 K/UL — HIGH (ref 4.8–10.8)
WBC # FLD AUTO: 11.75 K/UL — HIGH (ref 4.8–10.8)
WBC # FLD AUTO: 11.77 K/UL — HIGH (ref 4.8–10.8)
WBC # FLD AUTO: 11.82 K/UL — HIGH (ref 4.8–10.8)
WBC # FLD AUTO: 12.06 K/UL — HIGH (ref 4.8–10.8)
WBC # FLD AUTO: 12.54 K/UL — HIGH (ref 4.8–10.8)
WBC # FLD AUTO: 12.66 K/UL — HIGH (ref 4.8–10.8)
WBC # FLD AUTO: 12.78 K/UL — HIGH (ref 4.8–10.8)
WBC # FLD AUTO: 13.16 K/UL — HIGH (ref 4.8–10.8)
WBC # FLD AUTO: 13.85 K/UL — HIGH (ref 4.8–10.8)
WBC # FLD AUTO: 14.01 K/UL — HIGH (ref 4.8–10.8)
WBC # FLD AUTO: 14.34 K/UL — HIGH (ref 4.8–10.8)
WBC # FLD AUTO: 14.62 K/UL — HIGH (ref 4.8–10.8)
WBC # FLD AUTO: 15.35 K/UL — HIGH (ref 4.8–10.8)
WBC # FLD AUTO: 15.96 K/UL — HIGH (ref 4.8–10.8)
WBC # FLD AUTO: 15.96 K/UL — HIGH (ref 4.8–10.8)
WBC # FLD AUTO: 19.29 K/UL — HIGH (ref 4.8–10.8)
WBC # FLD AUTO: 21.57 K/UL — HIGH (ref 4.8–10.8)
WBC # FLD AUTO: 7.11 K/UL — SIGNIFICANT CHANGE UP (ref 4.8–10.8)
WBC # FLD AUTO: 7.17 K/UL — SIGNIFICANT CHANGE UP (ref 4.8–10.8)
WBC # FLD AUTO: 7.59 K/UL — SIGNIFICANT CHANGE UP (ref 4.8–10.8)
WBC # FLD AUTO: 7.65 K/UL — SIGNIFICANT CHANGE UP (ref 4.8–10.8)
WBC # FLD AUTO: 7.68 K/UL — SIGNIFICANT CHANGE UP (ref 4.8–10.8)
WBC # FLD AUTO: 7.77 K/UL — SIGNIFICANT CHANGE UP (ref 4.8–10.8)
WBC # FLD AUTO: 7.81 K/UL — SIGNIFICANT CHANGE UP (ref 4.8–10.8)
WBC # FLD AUTO: 7.82 K/UL — SIGNIFICANT CHANGE UP (ref 4.8–10.8)
WBC # FLD AUTO: 7.83 K/UL — SIGNIFICANT CHANGE UP (ref 4.8–10.8)
WBC # FLD AUTO: 7.94 K/UL — SIGNIFICANT CHANGE UP (ref 4.8–10.8)
WBC # FLD AUTO: 8.02 K/UL — SIGNIFICANT CHANGE UP (ref 4.8–10.8)
WBC # FLD AUTO: 8.04 K/UL — SIGNIFICANT CHANGE UP (ref 4.8–10.8)
WBC # FLD AUTO: 8.09 K/UL — SIGNIFICANT CHANGE UP (ref 4.8–10.8)
WBC # FLD AUTO: 8.13 K/UL — SIGNIFICANT CHANGE UP (ref 4.8–10.8)
WBC # FLD AUTO: 8.18 K/UL — SIGNIFICANT CHANGE UP (ref 4.8–10.8)
WBC # FLD AUTO: 8.19 K/UL — SIGNIFICANT CHANGE UP (ref 4.8–10.8)
WBC # FLD AUTO: 8.27 K/UL — SIGNIFICANT CHANGE UP (ref 4.8–10.8)
WBC # FLD AUTO: 8.32 K/UL — SIGNIFICANT CHANGE UP (ref 4.8–10.8)
WBC # FLD AUTO: 8.5 K/UL — SIGNIFICANT CHANGE UP (ref 4.8–10.8)
WBC # FLD AUTO: 8.59 K/UL — SIGNIFICANT CHANGE UP (ref 4.8–10.8)
WBC # FLD AUTO: 8.63 K/UL — SIGNIFICANT CHANGE UP (ref 4.8–10.8)
WBC # FLD AUTO: 8.69 K/UL — SIGNIFICANT CHANGE UP (ref 4.8–10.8)
WBC # FLD AUTO: 8.81 K/UL — SIGNIFICANT CHANGE UP (ref 4.8–10.8)
WBC # FLD AUTO: 8.82 K/UL — SIGNIFICANT CHANGE UP (ref 4.8–10.8)
WBC # FLD AUTO: 8.83 K/UL — SIGNIFICANT CHANGE UP (ref 4.8–10.8)
WBC # FLD AUTO: 8.85 K/UL — SIGNIFICANT CHANGE UP (ref 4.8–10.8)
WBC # FLD AUTO: 8.86 K/UL — SIGNIFICANT CHANGE UP (ref 4.8–10.8)
WBC # FLD AUTO: 8.96 K/UL — SIGNIFICANT CHANGE UP (ref 4.8–10.8)
WBC # FLD AUTO: 9.02 K/UL — SIGNIFICANT CHANGE UP (ref 4.8–10.8)
WBC # FLD AUTO: 9.06 K/UL — SIGNIFICANT CHANGE UP (ref 4.8–10.8)
WBC # FLD AUTO: 9.06 K/UL — SIGNIFICANT CHANGE UP (ref 4.8–10.8)
WBC # FLD AUTO: 9.07 K/UL — SIGNIFICANT CHANGE UP (ref 4.8–10.8)
WBC # FLD AUTO: 9.13 K/UL — SIGNIFICANT CHANGE UP (ref 4.8–10.8)
WBC # FLD AUTO: 9.14 K/UL — SIGNIFICANT CHANGE UP (ref 4.8–10.8)
WBC # FLD AUTO: 9.26 K/UL — SIGNIFICANT CHANGE UP (ref 4.8–10.8)
WBC # FLD AUTO: 9.29 K/UL — SIGNIFICANT CHANGE UP (ref 4.8–10.8)
WBC # FLD AUTO: 9.3 K/UL — SIGNIFICANT CHANGE UP (ref 4.8–10.8)
WBC # FLD AUTO: 9.32 K/UL — SIGNIFICANT CHANGE UP (ref 4.8–10.8)
WBC # FLD AUTO: 9.32 K/UL — SIGNIFICANT CHANGE UP (ref 4.8–10.8)
WBC # FLD AUTO: 9.39 K/UL — SIGNIFICANT CHANGE UP (ref 4.8–10.8)
WBC # FLD AUTO: 9.42 K/UL — SIGNIFICANT CHANGE UP (ref 4.8–10.8)
WBC # FLD AUTO: 9.52 K/UL — SIGNIFICANT CHANGE UP (ref 4.8–10.8)
WBC # FLD AUTO: 9.53 K/UL — SIGNIFICANT CHANGE UP (ref 4.8–10.8)
WBC # FLD AUTO: 9.55 K/UL — SIGNIFICANT CHANGE UP (ref 4.8–10.8)
WBC # FLD AUTO: 9.65 K/UL — SIGNIFICANT CHANGE UP (ref 4.8–10.8)
WBC # FLD AUTO: 9.78 K/UL — SIGNIFICANT CHANGE UP (ref 4.8–10.8)
WBC # FLD AUTO: 9.8 K/UL — SIGNIFICANT CHANGE UP (ref 4.8–10.8)
WBC # FLD AUTO: 9.85 K/UL — SIGNIFICANT CHANGE UP (ref 4.8–10.8)
WBC # FLD AUTO: 9.9 K/UL — SIGNIFICANT CHANGE UP (ref 4.8–10.8)
WBC # FLD AUTO: 9.9 K/UL — SIGNIFICANT CHANGE UP (ref 4.8–10.8)
WBC # FLD AUTO: 9.93 K/UL — SIGNIFICANT CHANGE UP (ref 4.8–10.8)
WBC # FLD AUTO: 9.98 K/UL — SIGNIFICANT CHANGE UP (ref 4.8–10.8)
WBC # FLD AUTO: 9.99 K/UL — SIGNIFICANT CHANGE UP (ref 4.8–10.8)
WBC UR QL: 59 /HPF — HIGH (ref 0–5)
WBC UR QL: ABNORMAL /HPF
WBC UR QL: SIGNIFICANT CHANGE UP /HPF
WBC UR QL: SIGNIFICANT CHANGE UP /HPF

## 2019-01-01 PROCEDURE — 99233 SBSQ HOSP IP/OBS HIGH 50: CPT

## 2019-01-01 PROCEDURE — 93010 ELECTROCARDIOGRAM REPORT: CPT

## 2019-01-01 PROCEDURE — 99024 POSTOP FOLLOW-UP VISIT: CPT

## 2019-01-01 PROCEDURE — 27880 AMPUTATION OF LOWER LEG: CPT | Mod: LT

## 2019-01-01 PROCEDURE — 70553 MRI BRAIN STEM W/O & W/DYE: CPT | Mod: 26

## 2019-01-01 PROCEDURE — 70450 CT HEAD/BRAIN W/O DYE: CPT | Mod: 26

## 2019-01-01 PROCEDURE — 99212 OFFICE O/P EST SF 10 MIN: CPT | Mod: 25

## 2019-01-01 PROCEDURE — 43239 EGD BIOPSY SINGLE/MULTIPLE: CPT

## 2019-01-01 PROCEDURE — 99291 CRITICAL CARE FIRST HOUR: CPT

## 2019-01-01 PROCEDURE — 88311 DECALCIFY TISSUE: CPT | Mod: 26

## 2019-01-01 PROCEDURE — 99222 1ST HOSP IP/OBS MODERATE 55: CPT

## 2019-01-01 PROCEDURE — 71045 X-RAY EXAM CHEST 1 VIEW: CPT | Mod: 26

## 2019-01-01 PROCEDURE — 88312 SPECIAL STAINS GROUP 1: CPT | Mod: 26

## 2019-01-01 PROCEDURE — 93926 LOWER EXTREMITY STUDY: CPT | Mod: 26,LT

## 2019-01-01 PROCEDURE — 11045 DBRDMT SUBQ TISS EACH ADDL: CPT

## 2019-01-01 PROCEDURE — 99232 SBSQ HOSP IP/OBS MODERATE 35: CPT

## 2019-01-01 PROCEDURE — 88313 SPECIAL STAINS GROUP 2: CPT | Mod: 26

## 2019-01-01 PROCEDURE — 88307 TISSUE EXAM BY PATHOLOGIST: CPT | Mod: 26

## 2019-01-01 PROCEDURE — 93306 TTE W/DOPPLER COMPLETE: CPT | Mod: 26

## 2019-01-01 PROCEDURE — 99285 EMERGENCY DEPT VISIT HI MDM: CPT | Mod: GC

## 2019-01-01 PROCEDURE — 27884 AMPUTATION FOLLOW-UP SURGERY: CPT | Mod: 58

## 2019-01-01 PROCEDURE — 93925 LOWER EXTREMITY STUDY: CPT | Mod: 26

## 2019-01-01 PROCEDURE — 88305 TISSUE EXAM BY PATHOLOGIST: CPT | Mod: 26

## 2019-01-01 PROCEDURE — 99239 HOSP IP/OBS DSCHRG MGMT >30: CPT

## 2019-01-01 PROCEDURE — 51705 CHANGE OF BLADDER TUBE: CPT

## 2019-01-01 PROCEDURE — 74176 CT ABD & PELVIS W/O CONTRAST: CPT | Mod: 26

## 2019-01-01 PROCEDURE — 76775 US EXAM ABDO BACK WALL LIM: CPT | Mod: 26

## 2019-01-01 PROCEDURE — 16030 DRESS/DEBRID P-THICK BURN L: CPT

## 2019-01-01 PROCEDURE — 99231 SBSQ HOSP IP/OBS SF/LOW 25: CPT

## 2019-01-01 PROCEDURE — 88309 TISSUE EXAM BY PATHOLOGIST: CPT | Mod: 26

## 2019-01-01 PROCEDURE — 36556 INSERT NON-TUNNEL CV CATH: CPT

## 2019-01-01 PROCEDURE — 97597 DBRDMT OPN WND 1ST 20 CM/<: CPT

## 2019-01-01 PROCEDURE — 31500 INSERT EMERGENCY AIRWAY: CPT

## 2019-01-01 PROCEDURE — 72170 X-RAY EXAM OF PELVIS: CPT | Mod: 26

## 2019-01-01 PROCEDURE — 99223 1ST HOSP IP/OBS HIGH 75: CPT

## 2019-01-01 PROCEDURE — 11042 DBRDMT SUBQ TIS 1ST 20SQCM/<: CPT

## 2019-01-01 PROCEDURE — 27882 AMPUTATION OF LOWER LEG: CPT | Mod: LT,58

## 2019-01-01 PROCEDURE — 73630 X-RAY EXAM OF FOOT: CPT | Mod: 26,50

## 2019-01-01 PROCEDURE — 99232 SBSQ HOSP IP/OBS MODERATE 35: CPT | Mod: GC

## 2019-01-01 PROCEDURE — 99223 1ST HOSP IP/OBS HIGH 75: CPT | Mod: AI

## 2019-01-01 PROCEDURE — 31575 DIAGNOSTIC LARYNGOSCOPY: CPT

## 2019-01-01 PROCEDURE — 71045 X-RAY EXAM CHEST 1 VIEW: CPT | Mod: 26,76

## 2019-01-01 PROCEDURE — 99221 1ST HOSP IP/OBS SF/LOW 40: CPT | Mod: 25

## 2019-01-01 PROCEDURE — 99291 CRITICAL CARE FIRST HOUR: CPT | Mod: 25

## 2019-01-01 PROCEDURE — 99232 SBSQ HOSP IP/OBS MODERATE 35: CPT | Mod: 25

## 2019-01-01 PROCEDURE — 11043 DBRDMT MUSC&/FSCA 1ST 20/<: CPT | Mod: 78

## 2019-01-01 PROCEDURE — 76536 US EXAM OF HEAD AND NECK: CPT | Mod: 26

## 2019-01-01 PROCEDURE — 99214 OFFICE O/P EST MOD 30 MIN: CPT | Mod: 25

## 2019-01-01 PROCEDURE — 99221 1ST HOSP IP/OBS SF/LOW 40: CPT

## 2019-01-01 PROCEDURE — 76770 US EXAM ABDO BACK WALL COMP: CPT | Mod: 26

## 2019-01-01 PROCEDURE — 74018 RADEX ABDOMEN 1 VIEW: CPT | Mod: 26

## 2019-01-01 PROCEDURE — 99292 CRITICAL CARE ADDL 30 MIN: CPT

## 2019-01-01 PROCEDURE — 99291 CRITICAL CARE FIRST HOUR: CPT | Mod: GC

## 2019-01-01 PROCEDURE — 70551 MRI BRAIN STEM W/O DYE: CPT | Mod: 26

## 2019-01-01 PROCEDURE — 27882 AMPUTATION OF LOWER LEG: CPT

## 2019-01-01 PROCEDURE — 99285 EMERGENCY DEPT VISIT HI MDM: CPT

## 2019-01-01 PROCEDURE — 73630 X-RAY EXAM OF FOOT: CPT | Mod: 26,LT

## 2019-01-01 PROCEDURE — G9001: CPT

## 2019-01-01 RX ORDER — METRONIDAZOLE 500 MG
500 TABLET ORAL EVERY 8 HOURS
Refills: 0 | Status: DISCONTINUED | OUTPATIENT
Start: 2019-01-01 | End: 2019-01-01

## 2019-01-01 RX ORDER — OXYCODONE HYDROCHLORIDE 5 MG/1
30 TABLET ORAL EVERY 8 HOURS
Refills: 0 | Status: DISCONTINUED | OUTPATIENT
Start: 2019-01-01 | End: 2020-01-01

## 2019-01-01 RX ORDER — HEPARIN SODIUM 5000 [USP'U]/ML
5000 INJECTION INTRAVENOUS; SUBCUTANEOUS EVERY 8 HOURS
Refills: 0 | Status: DISCONTINUED | OUTPATIENT
Start: 2019-01-01 | End: 2019-01-01

## 2019-01-01 RX ORDER — METHADONE HYDROCHLORIDE 40 MG/1
40 TABLET ORAL ONCE
Refills: 0 | Status: DISCONTINUED | OUTPATIENT
Start: 2019-01-01 | End: 2019-01-01

## 2019-01-01 RX ORDER — MAGNESIUM SULFATE 500 MG/ML
1 VIAL (ML) INJECTION ONCE
Refills: 0 | Status: COMPLETED | OUTPATIENT
Start: 2019-01-01 | End: 2019-01-01

## 2019-01-01 RX ORDER — DIAZEPAM 5 MG
5 TABLET ORAL ONCE
Refills: 0 | Status: DISCONTINUED | OUTPATIENT
Start: 2019-01-01 | End: 2019-01-01

## 2019-01-01 RX ORDER — SODIUM POLYSTYRENE SULFONATE 4.1 MEQ/G
15 POWDER, FOR SUSPENSION ORAL ONCE
Refills: 0 | Status: COMPLETED | OUTPATIENT
Start: 2019-01-01 | End: 2019-01-01

## 2019-01-01 RX ORDER — DEXTROSE 10 % IN WATER 10 %
250 INTRAVENOUS SOLUTION INTRAVENOUS
Refills: 0 | Status: COMPLETED | OUTPATIENT
Start: 2019-01-01 | End: 2019-01-01

## 2019-01-01 RX ORDER — SODIUM HYPOCHLORITE 0.125 %
1 SOLUTION, NON-ORAL MISCELLANEOUS
Refills: 0 | Status: DISCONTINUED | OUTPATIENT
Start: 2019-01-01 | End: 2019-01-01

## 2019-01-01 RX ORDER — NALOXONE HYDROCHLORIDE 4 MG/.1ML
0.4 SPRAY NASAL ONCE
Refills: 0 | Status: DISCONTINUED | OUTPATIENT
Start: 2019-01-01 | End: 2019-01-01

## 2019-01-01 RX ORDER — AMITRIPTYLINE HCL 25 MG
1 TABLET ORAL
Qty: 15 | Refills: 0
Start: 2019-01-01 | End: 2020-01-01

## 2019-01-01 RX ORDER — DRONABINOL 2.5 MG
2.5 CAPSULE ORAL
Refills: 0 | Status: DISCONTINUED | OUTPATIENT
Start: 2019-01-01 | End: 2019-01-01

## 2019-01-01 RX ORDER — DEXTROSE 50 % IN WATER 50 %
100 SYRINGE (ML) INTRAVENOUS ONCE
Refills: 0 | Status: DISCONTINUED | OUTPATIENT
Start: 2019-01-01 | End: 2019-01-01

## 2019-01-01 RX ORDER — DEXTROSE 50 % IN WATER 50 %
12.5 SYRINGE (ML) INTRAVENOUS ONCE
Refills: 0 | Status: DISCONTINUED | OUTPATIENT
Start: 2019-01-01 | End: 2019-01-01

## 2019-01-01 RX ORDER — OXYCODONE AND ACETAMINOPHEN 5; 325 MG/1; MG/1
1 TABLET ORAL EVERY 6 HOURS
Refills: 0 | Status: DISCONTINUED | OUTPATIENT
Start: 2019-01-01 | End: 2019-01-01

## 2019-01-01 RX ORDER — INSULIN HUMAN 100 [IU]/ML
10 INJECTION, SOLUTION SUBCUTANEOUS ONCE
Refills: 0 | Status: COMPLETED | OUTPATIENT
Start: 2019-01-01 | End: 2019-01-01

## 2019-01-01 RX ORDER — SODIUM CHLORIDE 9 MG/ML
250 INJECTION INTRAMUSCULAR; INTRAVENOUS; SUBCUTANEOUS ONCE
Refills: 0 | Status: DISCONTINUED | OUTPATIENT
Start: 2019-01-01 | End: 2019-01-01

## 2019-01-01 RX ORDER — MEROPENEM 1 G/30ML
500 INJECTION INTRAVENOUS EVERY 12 HOURS
Refills: 0 | Status: DISCONTINUED | OUTPATIENT
Start: 2019-01-01 | End: 2019-01-01

## 2019-01-01 RX ORDER — DOCUSATE SODIUM 100 MG
100 CAPSULE ORAL THREE TIMES A DAY
Refills: 0 | Status: DISCONTINUED | OUTPATIENT
Start: 2019-01-01 | End: 2019-01-01

## 2019-01-01 RX ORDER — SODIUM CHLORIDE 9 MG/ML
1000 INJECTION, SOLUTION INTRAVENOUS
Refills: 0 | Status: DISCONTINUED | OUTPATIENT
Start: 2019-01-01 | End: 2019-01-01

## 2019-01-01 RX ORDER — METHADONE HYDROCHLORIDE 40 MG/1
80 TABLET ORAL
Refills: 0 | Status: COMPLETED | OUTPATIENT
Start: 2019-01-01 | End: 2019-01-01

## 2019-01-01 RX ORDER — DIAZEPAM 5 MG
10 TABLET ORAL
Refills: 0 | Status: DISCONTINUED | OUTPATIENT
Start: 2019-01-01 | End: 2019-01-01

## 2019-01-01 RX ORDER — COLLAGENASE SANTYL 250 [ARB'U]/G
250 OINTMENT TOPICAL DAILY
Qty: 1 | Refills: 2 | Status: ACTIVE | COMMUNITY
Start: 2017-10-05 | End: 1900-01-01

## 2019-01-01 RX ORDER — OXYBUTYNIN CHLORIDE 5 MG
5 TABLET ORAL ONCE
Refills: 0 | Status: COMPLETED | OUTPATIENT
Start: 2019-01-01 | End: 2019-01-01

## 2019-01-01 RX ORDER — ALPRAZOLAM 0.25 MG
2 TABLET ORAL AT BEDTIME
Refills: 0 | Status: DISCONTINUED | OUTPATIENT
Start: 2019-01-01 | End: 2019-01-01

## 2019-01-01 RX ORDER — LISINOPRIL 2.5 MG/1
1 TABLET ORAL
Qty: 0 | Refills: 0 | DISCHARGE

## 2019-01-01 RX ORDER — SODIUM CHLORIDE 9 MG/ML
1000 INJECTION INTRAMUSCULAR; INTRAVENOUS; SUBCUTANEOUS
Refills: 0 | Status: DISCONTINUED | OUTPATIENT
Start: 2019-01-01 | End: 2019-01-01

## 2019-01-01 RX ORDER — DEXTROSE 50 % IN WATER 50 %
15 SYRINGE (ML) INTRAVENOUS ONCE
Refills: 0 | Status: DISCONTINUED | OUTPATIENT
Start: 2019-01-01 | End: 2019-01-01

## 2019-01-01 RX ORDER — ALTEPLASE 100 MG
2 KIT INTRAVENOUS ONCE
Refills: 0 | Status: COMPLETED | OUTPATIENT
Start: 2019-01-01 | End: 2019-01-01

## 2019-01-01 RX ORDER — ZINC SULFATE TAB 220 MG (50 MG ZINC EQUIVALENT) 220 (50 ZN) MG
220 TAB ORAL DAILY
Refills: 0 | Status: DISCONTINUED | OUTPATIENT
Start: 2019-01-01 | End: 2019-01-01

## 2019-01-01 RX ORDER — ONDANSETRON 8 MG/1
4 TABLET, FILM COATED ORAL ONCE
Refills: 0 | Status: COMPLETED | OUTPATIENT
Start: 2019-01-01 | End: 2019-01-01

## 2019-01-01 RX ORDER — AMITRIPTYLINE HCL 25 MG
25 TABLET ORAL AT BEDTIME
Refills: 0 | Status: DISCONTINUED | OUTPATIENT
Start: 2019-01-01 | End: 2020-01-01

## 2019-01-01 RX ORDER — OXYBUTYNIN CHLORIDE 5 MG
5 TABLET ORAL EVERY 12 HOURS
Refills: 0 | Status: DISCONTINUED | OUTPATIENT
Start: 2019-01-01 | End: 2019-01-01

## 2019-01-01 RX ORDER — CALCIUM GLUCONATE 100 MG/ML
1 VIAL (ML) INTRAVENOUS ONCE
Refills: 0 | Status: DISCONTINUED | OUTPATIENT
Start: 2019-01-01 | End: 2019-01-01

## 2019-01-01 RX ORDER — ACETAMINOPHEN 500 MG
650 TABLET ORAL EVERY 6 HOURS
Refills: 0 | Status: DISCONTINUED | OUTPATIENT
Start: 2019-01-01 | End: 2019-01-01

## 2019-01-01 RX ORDER — METHADONE HYDROCHLORIDE 40 MG/1
8 TABLET ORAL
Qty: 0 | Refills: 0 | DISCHARGE
Start: 2019-01-01

## 2019-01-01 RX ORDER — HYDROCORTISONE 20 MG
20 TABLET ORAL DAILY
Refills: 0 | Status: DISCONTINUED | OUTPATIENT
Start: 2019-01-01 | End: 2020-01-01

## 2019-01-01 RX ORDER — METHADONE HYDROCHLORIDE 40 MG/1
80 TABLET ORAL EVERY 6 HOURS
Refills: 0 | Status: DISCONTINUED | OUTPATIENT
Start: 2019-01-01 | End: 2019-01-01

## 2019-01-01 RX ORDER — DEXTROSE 10 % IN WATER 10 %
250 INTRAVENOUS SOLUTION INTRAVENOUS
Refills: 0 | Status: DISCONTINUED | OUTPATIENT
Start: 2019-01-01 | End: 2019-01-01

## 2019-01-01 RX ORDER — DIAZEPAM 5 MG
5 TABLET ORAL THREE TIMES A DAY
Refills: 0 | Status: DISCONTINUED | OUTPATIENT
Start: 2019-01-01 | End: 2019-01-01

## 2019-01-01 RX ORDER — CALCIUM ACETATE 667 MG
667 TABLET ORAL
Refills: 0 | Status: DISCONTINUED | OUTPATIENT
Start: 2019-01-01 | End: 2019-01-01

## 2019-01-01 RX ORDER — SODIUM POLYSTYRENE SULFONATE 4.1 MEQ/G
30 POWDER, FOR SUSPENSION ORAL ONCE
Refills: 0 | Status: COMPLETED | OUTPATIENT
Start: 2019-01-01 | End: 2019-01-01

## 2019-01-01 RX ORDER — DEXTROSE 10 % IN WATER 10 %
100 INTRAVENOUS SOLUTION INTRAVENOUS ONCE
Refills: 0 | Status: COMPLETED | OUTPATIENT
Start: 2019-01-01 | End: 2019-01-01

## 2019-01-01 RX ORDER — SODIUM CHLORIDE 9 MG/ML
250 INJECTION INTRAMUSCULAR; INTRAVENOUS; SUBCUTANEOUS ONCE
Refills: 0 | Status: COMPLETED | OUTPATIENT
Start: 2019-01-01 | End: 2019-01-01

## 2019-01-01 RX ORDER — AMLODIPINE BESYLATE 2.5 MG/1
5 TABLET ORAL EVERY 24 HOURS
Refills: 0 | Status: DISCONTINUED | OUTPATIENT
Start: 2019-01-01 | End: 2019-01-01

## 2019-01-01 RX ORDER — OXYCODONE HYDROCHLORIDE 5 MG/1
30 TABLET ORAL EVERY 6 HOURS
Refills: 0 | Status: DISCONTINUED | OUTPATIENT
Start: 2019-01-01 | End: 2019-01-01

## 2019-01-01 RX ORDER — CEFEPIME 1 G/1
INJECTION, POWDER, FOR SOLUTION INTRAMUSCULAR; INTRAVENOUS
Refills: 0 | Status: DISCONTINUED | OUTPATIENT
Start: 2019-01-01 | End: 2019-01-01

## 2019-01-01 RX ORDER — MAGNESIUM SULFATE 500 MG/ML
2 VIAL (ML) INJECTION ONCE
Refills: 0 | Status: COMPLETED | OUTPATIENT
Start: 2019-01-01 | End: 2019-01-01

## 2019-01-01 RX ORDER — SODIUM BICARBONATE 1 MEQ/ML
50 SYRINGE (ML) INTRAVENOUS ONCE
Refills: 0 | Status: COMPLETED | OUTPATIENT
Start: 2019-01-01 | End: 2019-01-01

## 2019-01-01 RX ORDER — MORPHINE SULFATE 50 MG/1
2 CAPSULE, EXTENDED RELEASE ORAL
Refills: 0 | Status: DISCONTINUED | OUTPATIENT
Start: 2019-01-01 | End: 2019-01-01

## 2019-01-01 RX ORDER — DEXTROSE 50 % IN WATER 50 %
50 SYRINGE (ML) INTRAVENOUS ONCE
Refills: 0 | Status: COMPLETED | OUTPATIENT
Start: 2019-01-01 | End: 2019-01-01

## 2019-01-01 RX ORDER — MORPHINE SULFATE 50 MG/1
2 CAPSULE, EXTENDED RELEASE ORAL ONCE
Refills: 0 | Status: DISCONTINUED | OUTPATIENT
Start: 2019-01-01 | End: 2019-01-01

## 2019-01-01 RX ORDER — DEXTROSE 50 % IN WATER 50 %
25 SYRINGE (ML) INTRAVENOUS ONCE
Refills: 0 | Status: DISCONTINUED | OUTPATIENT
Start: 2019-01-01 | End: 2019-01-01

## 2019-01-01 RX ORDER — HYDROMORPHONE HYDROCHLORIDE 2 MG/ML
0.5 INJECTION INTRAMUSCULAR; INTRAVENOUS; SUBCUTANEOUS
Refills: 0 | Status: DISCONTINUED | OUTPATIENT
Start: 2019-01-01 | End: 2019-01-01

## 2019-01-01 RX ORDER — CHLORHEXIDINE GLUCONATE 213 G/1000ML
1 SOLUTION TOPICAL
Refills: 0 | Status: DISCONTINUED | OUTPATIENT
Start: 2019-01-01 | End: 2019-01-01

## 2019-01-01 RX ORDER — CHLORHEXIDINE GLUCONATE 213 G/1000ML
15 SOLUTION TOPICAL EVERY 12 HOURS
Refills: 0 | Status: DISCONTINUED | OUTPATIENT
Start: 2019-01-01 | End: 2019-01-01

## 2019-01-01 RX ORDER — SODIUM BICARBONATE 1 MEQ/ML
650 SYRINGE (ML) INTRAVENOUS THREE TIMES A DAY
Refills: 0 | Status: DISCONTINUED | OUTPATIENT
Start: 2019-01-01 | End: 2019-01-01

## 2019-01-01 RX ORDER — MAGNESIUM OXIDE 400 MG ORAL TABLET 241.3 MG
400 TABLET ORAL
Refills: 0 | Status: DISCONTINUED | OUTPATIENT
Start: 2019-01-01 | End: 2019-01-01

## 2019-01-01 RX ORDER — ASCORBIC ACID 60 MG
500 TABLET,CHEWABLE ORAL DAILY
Refills: 0 | Status: DISCONTINUED | OUTPATIENT
Start: 2019-01-01 | End: 2019-01-01

## 2019-01-01 RX ORDER — HYDROCORTISONE 20 MG
5 TABLET ORAL
Refills: 0 | Status: DISCONTINUED | OUTPATIENT
Start: 2019-01-01 | End: 2019-01-01

## 2019-01-01 RX ORDER — CALCIUM GLUCONATE 100 MG/ML
1 VIAL (ML) INTRAVENOUS ONCE
Refills: 0 | Status: COMPLETED | OUTPATIENT
Start: 2019-01-01 | End: 2019-01-01

## 2019-01-01 RX ORDER — SODIUM CHLORIDE 9 MG/ML
1000 INJECTION, SOLUTION INTRAVENOUS ONCE
Refills: 0 | Status: COMPLETED | OUTPATIENT
Start: 2019-01-01 | End: 2019-01-01

## 2019-01-01 RX ORDER — DEXAMETHASONE 0.5 MG/5ML
0.5 ELIXIR ORAL DAILY
Refills: 0 | Status: DISCONTINUED | OUTPATIENT
Start: 2019-01-01 | End: 2019-01-01

## 2019-01-01 RX ORDER — OXYCODONE HYDROCHLORIDE 5 MG/1
60 TABLET ORAL THREE TIMES A DAY
Refills: 0 | Status: DISCONTINUED | OUTPATIENT
Start: 2019-01-01 | End: 2019-01-01

## 2019-01-01 RX ORDER — CEFTRIAXONE 500 MG/1
1000 INJECTION, POWDER, FOR SOLUTION INTRAMUSCULAR; INTRAVENOUS EVERY 24 HOURS
Refills: 0 | Status: DISCONTINUED | OUTPATIENT
Start: 2019-01-01 | End: 2019-01-01

## 2019-01-01 RX ORDER — MUPIROCIN 20 MG/G
1 OINTMENT TOPICAL
Refills: 0 | Status: DISCONTINUED | OUTPATIENT
Start: 2019-01-01 | End: 2019-01-01

## 2019-01-01 RX ORDER — OXYCODONE HYDROCHLORIDE 5 MG/1
60 TABLET ORAL
Refills: 0 | Status: DISCONTINUED | OUTPATIENT
Start: 2019-01-01 | End: 2019-01-01

## 2019-01-01 RX ORDER — ALPRAZOLAM 0.25 MG
0.5 TABLET ORAL AT BEDTIME
Refills: 0 | Status: DISCONTINUED | OUTPATIENT
Start: 2019-01-01 | End: 2019-01-01

## 2019-01-01 RX ORDER — METHADONE HYDROCHLORIDE 40 MG/1
10 TABLET ORAL DAILY
Refills: 0 | Status: DISCONTINUED | OUTPATIENT
Start: 2019-01-01 | End: 2019-01-01

## 2019-01-01 RX ORDER — OXYCODONE AND ACETAMINOPHEN 5; 325 MG/1; MG/1
1 TABLET ORAL ONCE
Refills: 0 | Status: DISCONTINUED | OUTPATIENT
Start: 2019-01-01 | End: 2019-01-01

## 2019-01-01 RX ORDER — NOREPINEPHRINE BITARTRATE/D5W 8 MG/250ML
0.05 PLASTIC BAG, INJECTION (ML) INTRAVENOUS
Qty: 16 | Refills: 0 | Status: DISCONTINUED | OUTPATIENT
Start: 2019-01-01 | End: 2019-01-01

## 2019-01-01 RX ORDER — HYDROMORPHONE HYDROCHLORIDE 2 MG/ML
1 INJECTION INTRAMUSCULAR; INTRAVENOUS; SUBCUTANEOUS
Refills: 0 | Status: DISCONTINUED | OUTPATIENT
Start: 2019-01-01 | End: 2019-01-01

## 2019-01-01 RX ORDER — NALOXONE HYDROCHLORIDE 4 MG/.1ML
0.08 SPRAY NASAL ONCE
Refills: 0 | Status: DISCONTINUED | OUTPATIENT
Start: 2019-01-01 | End: 2019-01-01

## 2019-01-01 RX ORDER — METHADONE HYDROCHLORIDE 40 MG/1
4 TABLET ORAL
Qty: 0 | Refills: 0 | DISCHARGE
Start: 2019-01-01

## 2019-01-01 RX ORDER — VANCOMYCIN HCL 1 G
1000 VIAL (EA) INTRAVENOUS ONCE
Refills: 0 | Status: COMPLETED | OUTPATIENT
Start: 2019-01-01 | End: 2019-01-01

## 2019-01-01 RX ORDER — SODIUM CHLORIDE 9 MG/ML
500 INJECTION INTRAMUSCULAR; INTRAVENOUS; SUBCUTANEOUS ONCE
Refills: 0 | Status: COMPLETED | OUTPATIENT
Start: 2019-01-01 | End: 2019-01-01

## 2019-01-01 RX ORDER — CEPHALEXIN 500 MG
1 CAPSULE ORAL
Qty: 4 | Refills: 0
Start: 2019-01-01 | End: 2019-01-01

## 2019-01-01 RX ORDER — METRONIDAZOLE 500 MG
500 TABLET ORAL ONCE
Refills: 0 | Status: COMPLETED | OUTPATIENT
Start: 2019-01-01 | End: 2019-01-01

## 2019-01-01 RX ORDER — METHADONE HYDROCHLORIDE 40 MG/1
40 TABLET ORAL THREE TIMES A DAY
Refills: 0 | Status: DISCONTINUED | OUTPATIENT
Start: 2019-01-01 | End: 2019-01-01

## 2019-01-01 RX ORDER — SODIUM CHLORIDE 9 MG/ML
1000 INJECTION INTRAMUSCULAR; INTRAVENOUS; SUBCUTANEOUS ONCE
Refills: 0 | Status: COMPLETED | OUTPATIENT
Start: 2019-01-01 | End: 2019-01-01

## 2019-01-01 RX ORDER — OXYBUTYNIN CHLORIDE 5 MG
5 TABLET ORAL DAILY
Refills: 0 | Status: DISCONTINUED | OUTPATIENT
Start: 2019-01-01 | End: 2019-01-01

## 2019-01-01 RX ORDER — METHADONE HYDROCHLORIDE 40 MG/1
60 TABLET ORAL
Refills: 0 | Status: DISCONTINUED | OUTPATIENT
Start: 2019-01-01 | End: 2019-01-01

## 2019-01-01 RX ORDER — HYDROCORTISONE 20 MG
1 TABLET ORAL
Qty: 15 | Refills: 0
Start: 2019-01-01 | End: 2020-01-01

## 2019-01-01 RX ORDER — SEVELAMER CARBONATE 2400 MG/1
800 POWDER, FOR SUSPENSION ORAL
Refills: 0 | Status: DISCONTINUED | OUTPATIENT
Start: 2019-01-01 | End: 2019-01-01

## 2019-01-01 RX ORDER — OXYCODONE HYDROCHLORIDE 5 MG/1
30 TABLET ORAL EVERY 12 HOURS
Refills: 0 | Status: DISCONTINUED | OUTPATIENT
Start: 2019-01-01 | End: 2019-01-01

## 2019-01-01 RX ORDER — CEFEPIME 1 G/1
1000 INJECTION, POWDER, FOR SOLUTION INTRAMUSCULAR; INTRAVENOUS ONCE
Refills: 0 | Status: COMPLETED | OUTPATIENT
Start: 2019-01-01 | End: 2019-01-01

## 2019-01-01 RX ORDER — CHOLECALCIFEROL (VITAMIN D3) 125 MCG
2000 CAPSULE ORAL DAILY
Refills: 0 | Status: DISCONTINUED | OUTPATIENT
Start: 2019-01-01 | End: 2020-01-01

## 2019-01-01 RX ORDER — LEVOTHYROXINE SODIUM 125 MCG
50 TABLET ORAL DAILY
Refills: 0 | Status: DISCONTINUED | OUTPATIENT
Start: 2019-01-01 | End: 2019-01-01

## 2019-01-01 RX ORDER — PANTOPRAZOLE SODIUM 20 MG/1
40 TABLET, DELAYED RELEASE ORAL
Refills: 0 | Status: DISCONTINUED | OUTPATIENT
Start: 2019-01-01 | End: 2019-01-01

## 2019-01-01 RX ORDER — OXYCODONE AND ACETAMINOPHEN 5; 325 MG/1; MG/1
2 TABLET ORAL EVERY 4 HOURS
Refills: 0 | Status: DISCONTINUED | OUTPATIENT
Start: 2019-01-01 | End: 2019-01-01

## 2019-01-01 RX ORDER — CEFEPIME 1 G/1
1000 INJECTION, POWDER, FOR SOLUTION INTRAMUSCULAR; INTRAVENOUS EVERY 8 HOURS
Refills: 0 | Status: DISCONTINUED | OUTPATIENT
Start: 2019-01-01 | End: 2019-01-01

## 2019-01-01 RX ORDER — CEFEPIME 1 G/1
1000 INJECTION, POWDER, FOR SOLUTION INTRAMUSCULAR; INTRAVENOUS EVERY 24 HOURS
Refills: 0 | Status: DISCONTINUED | OUTPATIENT
Start: 2019-01-01 | End: 2019-01-01

## 2019-01-01 RX ORDER — FENTANYL CITRATE 50 UG/ML
0.5 INJECTION INTRAVENOUS
Qty: 2500 | Refills: 0 | Status: DISCONTINUED | OUTPATIENT
Start: 2019-01-01 | End: 2019-01-01

## 2019-01-01 RX ORDER — COSYNTROPIN 0.25 MG/ML
0.25 INJECTION, SOLUTION INTRAVENOUS ONCE
Refills: 0 | Status: COMPLETED | OUTPATIENT
Start: 2019-01-01 | End: 2019-01-01

## 2019-01-01 RX ORDER — GABAPENTIN 400 MG/1
200 CAPSULE ORAL DAILY
Refills: 0 | Status: DISCONTINUED | OUTPATIENT
Start: 2019-01-01 | End: 2019-01-01

## 2019-01-01 RX ORDER — MORPHINE SULFATE 50 MG/1
2 CAPSULE, EXTENDED RELEASE ORAL EVERY 6 HOURS
Refills: 0 | Status: DISCONTINUED | OUTPATIENT
Start: 2019-01-01 | End: 2019-01-01

## 2019-01-01 RX ORDER — DIAZEPAM 5 MG
5 TABLET ORAL
Refills: 0 | Status: DISCONTINUED | OUTPATIENT
Start: 2019-01-01 | End: 2019-01-01

## 2019-01-01 RX ORDER — OXYCODONE HYDROCHLORIDE 5 MG/1
45 TABLET ORAL EVERY 6 HOURS
Refills: 0 | Status: DISCONTINUED | OUTPATIENT
Start: 2019-01-01 | End: 2019-01-01

## 2019-01-01 RX ORDER — SODIUM BICARBONATE 1 MEQ/ML
0.46 SYRINGE (ML) INTRAVENOUS
Qty: 150 | Refills: 0 | Status: DISCONTINUED | OUTPATIENT
Start: 2019-01-01 | End: 2019-01-01

## 2019-01-01 RX ORDER — DIAZEPAM 5 MG
2 TABLET ORAL ONCE
Refills: 0 | Status: DISCONTINUED | OUTPATIENT
Start: 2019-01-01 | End: 2019-01-01

## 2019-01-01 RX ORDER — INSULIN LISPRO 100/ML
VIAL (ML) SUBCUTANEOUS
Refills: 0 | Status: DISCONTINUED | OUTPATIENT
Start: 2019-01-01 | End: 2019-01-01

## 2019-01-01 RX ORDER — AMITRIPTYLINE HCL 25 MG
25 TABLET ORAL AT BEDTIME
Refills: 0 | Status: DISCONTINUED | OUTPATIENT
Start: 2019-01-01 | End: 2019-01-01

## 2019-01-01 RX ORDER — ERGOCALCIFEROL 1.25 MG/1
50000 CAPSULE ORAL ONCE
Refills: 0 | Status: COMPLETED | OUTPATIENT
Start: 2019-01-01 | End: 2019-01-01

## 2019-01-01 RX ORDER — VANCOMYCIN HCL 1 G
VIAL (EA) INTRAVENOUS
Refills: 0 | Status: DISCONTINUED | OUTPATIENT
Start: 2019-01-01 | End: 2019-01-01

## 2019-01-01 RX ORDER — VANCOMYCIN HCL 1 G
1000 VIAL (EA) INTRAVENOUS EVERY 24 HOURS
Refills: 0 | Status: DISCONTINUED | OUTPATIENT
Start: 2019-01-01 | End: 2019-01-01

## 2019-01-01 RX ORDER — METRONIDAZOLE 500 MG
TABLET ORAL
Refills: 0 | Status: DISCONTINUED | OUTPATIENT
Start: 2019-01-01 | End: 2019-01-01

## 2019-01-01 RX ORDER — HYDROCORTISONE 20 MG
5 TABLET ORAL DAILY
Refills: 0 | Status: DISCONTINUED | OUTPATIENT
Start: 2019-01-01 | End: 2019-01-01

## 2019-01-01 RX ORDER — CHOLECALCIFEROL (VITAMIN D3) 125 MCG
1000 CAPSULE ORAL DAILY
Refills: 0 | Status: DISCONTINUED | OUTPATIENT
Start: 2019-01-01 | End: 2019-01-01

## 2019-01-01 RX ORDER — GLUCAGON INJECTION, SOLUTION 0.5 MG/.1ML
1 INJECTION, SOLUTION SUBCUTANEOUS ONCE
Refills: 0 | Status: DISCONTINUED | OUTPATIENT
Start: 2019-01-01 | End: 2019-01-01

## 2019-01-01 RX ORDER — PHYTONADIONE (VIT K1) 5 MG
5 TABLET ORAL ONCE
Refills: 0 | Status: COMPLETED | OUTPATIENT
Start: 2019-01-01 | End: 2019-01-01

## 2019-01-01 RX ORDER — DEXTROSE 10 % IN WATER 10 %
1000 INTRAVENOUS SOLUTION INTRAVENOUS
Refills: 0 | Status: DISCONTINUED | OUTPATIENT
Start: 2019-01-01 | End: 2019-01-01

## 2019-01-01 RX ORDER — OXYCODONE HYDROCHLORIDE 5 MG/1
20 TABLET ORAL THREE TIMES A DAY
Refills: 0 | Status: DISCONTINUED | OUTPATIENT
Start: 2019-01-01 | End: 2019-01-01

## 2019-01-01 RX ORDER — LANOLIN ALCOHOL/MO/W.PET/CERES
10 CREAM (GRAM) TOPICAL AT BEDTIME
Refills: 0 | Status: DISCONTINUED | OUTPATIENT
Start: 2019-01-01 | End: 2019-01-01

## 2019-01-01 RX ORDER — MORPHINE SULFATE 50 MG/1
4 CAPSULE, EXTENDED RELEASE ORAL
Refills: 0 | Status: DISCONTINUED | OUTPATIENT
Start: 2019-01-01 | End: 2019-01-01

## 2019-01-01 RX ORDER — HYDROCORTISONE 20 MG
10 TABLET ORAL DAILY
Refills: 0 | Status: DISCONTINUED | OUTPATIENT
Start: 2019-01-01 | End: 2019-01-01

## 2019-01-01 RX ORDER — DEXTROSE 50 % IN WATER 50 %
25 SYRINGE (ML) INTRAVENOUS ONCE
Refills: 0 | Status: COMPLETED | OUTPATIENT
Start: 2019-01-01 | End: 2019-01-01

## 2019-01-01 RX ORDER — ETOMIDATE 2 MG/ML
21 INJECTION INTRAVENOUS ONCE
Refills: 0 | Status: DISCONTINUED | OUTPATIENT
Start: 2019-01-01 | End: 2019-01-01

## 2019-01-01 RX ORDER — DEXTROSE 50 % IN WATER 50 %
500 SYRINGE (ML) INTRAVENOUS ONCE
Refills: 0 | Status: DISCONTINUED | OUTPATIENT
Start: 2019-01-01 | End: 2019-01-01

## 2019-01-01 RX ORDER — LEVOTHYROXINE SODIUM 125 MCG
1 TABLET ORAL
Qty: 30 | Refills: 0
Start: 2019-01-01 | End: 2020-01-01

## 2019-01-01 RX ORDER — HEPARIN SODIUM 5000 [USP'U]/ML
5000 INJECTION INTRAVENOUS; SUBCUTANEOUS EVERY 12 HOURS
Refills: 0 | Status: DISCONTINUED | OUTPATIENT
Start: 2019-01-01 | End: 2019-01-01

## 2019-01-01 RX ORDER — LEVOTHYROXINE SODIUM 125 MCG
100 TABLET ORAL DAILY
Refills: 0 | Status: DISCONTINUED | OUTPATIENT
Start: 2019-01-01 | End: 2020-01-01

## 2019-01-01 RX ORDER — SENNA PLUS 8.6 MG/1
2 TABLET ORAL DAILY
Refills: 0 | Status: DISCONTINUED | OUTPATIENT
Start: 2019-01-01 | End: 2019-01-01

## 2019-01-01 RX ORDER — NYSTATIN CREAM 100000 [USP'U]/G
1 CREAM TOPICAL
Refills: 0 | Status: COMPLETED | OUTPATIENT
Start: 2019-01-01 | End: 2019-01-01

## 2019-01-01 RX ORDER — OXYCODONE HYDROCHLORIDE 5 MG/1
60 TABLET ORAL EVERY 6 HOURS
Refills: 0 | Status: DISCONTINUED | OUTPATIENT
Start: 2019-01-01 | End: 2019-01-01

## 2019-01-01 RX ORDER — HYDROMORPHONE HYDROCHLORIDE 2 MG/ML
1 INJECTION INTRAMUSCULAR; INTRAVENOUS; SUBCUTANEOUS ONCE
Refills: 0 | Status: DISCONTINUED | OUTPATIENT
Start: 2019-01-01 | End: 2019-01-01

## 2019-01-01 RX ORDER — OXYCODONE HYDROCHLORIDE 5 MG/1
20 TABLET ORAL EVERY 8 HOURS
Refills: 0 | Status: DISCONTINUED | OUTPATIENT
Start: 2019-01-01 | End: 2019-01-01

## 2019-01-01 RX ORDER — OXYCODONE HYDROCHLORIDE 5 MG/1
20 TABLET ORAL ONCE
Refills: 0 | Status: DISCONTINUED | OUTPATIENT
Start: 2019-01-01 | End: 2019-01-01

## 2019-01-01 RX ORDER — INSULIN HUMAN 100 [IU]/ML
5 INJECTION, SOLUTION SUBCUTANEOUS ONCE
Refills: 0 | Status: COMPLETED | OUTPATIENT
Start: 2019-01-01 | End: 2019-01-01

## 2019-01-01 RX ORDER — METOCLOPRAMIDE HCL 10 MG
5 TABLET ORAL EVERY 6 HOURS
Refills: 0 | Status: DISCONTINUED | OUTPATIENT
Start: 2019-01-01 | End: 2019-01-01

## 2019-01-01 RX ORDER — INSULIN LISPRO 100/ML
10 VIAL (ML) SUBCUTANEOUS ONCE
Refills: 0 | Status: DISCONTINUED | OUTPATIENT
Start: 2019-01-01 | End: 2019-01-01

## 2019-01-01 RX ORDER — VANCOMYCIN HCL 1 G
1500 VIAL (EA) INTRAVENOUS ONCE
Refills: 0 | Status: DISCONTINUED | OUTPATIENT
Start: 2019-01-01 | End: 2019-01-01

## 2019-01-01 RX ORDER — DEXTROSE 10 % IN WATER 10 %
250 INTRAVENOUS SOLUTION INTRAVENOUS ONCE
Refills: 0 | Status: COMPLETED | OUTPATIENT
Start: 2019-01-01 | End: 2019-01-01

## 2019-01-01 RX ORDER — DEXTROSE 50 % IN WATER 50 %
50 SYRINGE (ML) INTRAVENOUS ONCE
Refills: 0 | Status: DISCONTINUED | OUTPATIENT
Start: 2019-01-01 | End: 2019-01-01

## 2019-01-01 RX ORDER — OXYBUTYNIN CHLORIDE 5 MG
1 TABLET ORAL
Qty: 30 | Refills: 0
Start: 2019-01-01

## 2019-01-01 RX ORDER — ONDANSETRON 8 MG/1
4 TABLET, FILM COATED ORAL ONCE
Refills: 0 | Status: DISCONTINUED | OUTPATIENT
Start: 2019-01-01 | End: 2019-01-01

## 2019-01-01 RX ORDER — METHADONE HYDROCHLORIDE 40 MG/1
80 TABLET ORAL EVERY 8 HOURS
Refills: 0 | Status: DISCONTINUED | OUTPATIENT
Start: 2019-01-01 | End: 2019-01-01

## 2019-01-01 RX ORDER — ERYTHROPOIETIN 10000 [IU]/ML
7000 INJECTION, SOLUTION INTRAVENOUS; SUBCUTANEOUS
Refills: 0 | Status: DISCONTINUED | OUTPATIENT
Start: 2019-01-01 | End: 2019-01-01

## 2019-01-01 RX ORDER — ASCORBIC ACID 60 MG
250 TABLET,CHEWABLE ORAL DAILY
Refills: 0 | Status: DISCONTINUED | OUTPATIENT
Start: 2019-01-01 | End: 2019-01-01

## 2019-01-01 RX ORDER — OXYCODONE AND ACETAMINOPHEN 5; 325 MG/1; MG/1
1 TABLET ORAL EVERY 4 HOURS
Refills: 0 | Status: DISCONTINUED | OUTPATIENT
Start: 2019-01-01 | End: 2019-01-01

## 2019-01-01 RX ORDER — AMLODIPINE BESYLATE 2.5 MG/1
1 TABLET ORAL
Qty: 30 | Refills: 0
Start: 2019-01-01 | End: 2019-01-01

## 2019-01-01 RX ORDER — HYDROCORTISONE 20 MG
25 TABLET ORAL ONCE
Refills: 0 | Status: COMPLETED | OUTPATIENT
Start: 2019-01-01 | End: 2019-01-01

## 2019-01-01 RX ORDER — INSULIN HUMAN 100 [IU]/ML
10 INJECTION, SOLUTION SUBCUTANEOUS ONCE
Refills: 0 | Status: DISCONTINUED | OUTPATIENT
Start: 2019-01-01 | End: 2019-01-01

## 2019-01-01 RX ORDER — MINERAL OIL
133 OIL (ML) MISCELLANEOUS ONCE
Refills: 0 | Status: COMPLETED | OUTPATIENT
Start: 2019-01-01 | End: 2019-01-01

## 2019-01-01 RX ORDER — ROCURONIUM BROMIDE 10 MG/ML
70 VIAL (ML) INTRAVENOUS ONCE
Refills: 0 | Status: COMPLETED | OUTPATIENT
Start: 2019-01-01 | End: 2019-01-01

## 2019-01-01 RX ORDER — FERROUS SULFATE 325(65) MG
325 TABLET ORAL
Refills: 0 | Status: DISCONTINUED | OUTPATIENT
Start: 2019-01-01 | End: 2019-01-01

## 2019-01-01 RX ORDER — SODIUM CHLORIDE 9 MG/ML
500 INJECTION INTRAMUSCULAR; INTRAVENOUS; SUBCUTANEOUS ONCE
Refills: 0 | Status: DISCONTINUED | OUTPATIENT
Start: 2019-01-01 | End: 2019-01-01

## 2019-01-01 RX ORDER — IOHEXOL 300 MG/ML
30 INJECTION, SOLUTION INTRAVENOUS ONCE
Refills: 0 | Status: COMPLETED | OUTPATIENT
Start: 2019-01-01 | End: 2019-01-01

## 2019-01-01 RX ORDER — ERYTHROPOIETIN 10000 [IU]/ML
5000 INJECTION, SOLUTION INTRAVENOUS; SUBCUTANEOUS
Refills: 0 | Status: DISCONTINUED | OUTPATIENT
Start: 2019-01-01 | End: 2019-01-01

## 2019-01-01 RX ORDER — ERGOCALCIFEROL 1.25 MG/1
50000 CAPSULE ORAL
Refills: 0 | Status: DISCONTINUED | OUTPATIENT
Start: 2019-01-01 | End: 2019-01-01

## 2019-01-01 RX ORDER — NALOXONE HYDROCHLORIDE 4 MG/.1ML
0.4 SPRAY NASAL ONCE
Refills: 0 | Status: COMPLETED | OUTPATIENT
Start: 2019-01-01 | End: 2019-01-01

## 2019-01-01 RX ORDER — DIAZEPAM 5 MG
10 TABLET ORAL AT BEDTIME
Refills: 0 | Status: DISCONTINUED | OUTPATIENT
Start: 2019-01-01 | End: 2019-01-01

## 2019-01-01 RX ORDER — METHADONE HYDROCHLORIDE 40 MG/1
40 TABLET ORAL
Refills: 0 | Status: DISCONTINUED | OUTPATIENT
Start: 2019-01-01 | End: 2019-01-01

## 2019-01-01 RX ORDER — OXYCODONE HYDROCHLORIDE 5 MG/1
40 TABLET ORAL THREE TIMES A DAY
Refills: 0 | Status: DISCONTINUED | OUTPATIENT
Start: 2019-01-01 | End: 2019-01-01

## 2019-01-01 RX ORDER — METHADONE HYDROCHLORIDE 40 MG/1
80 TABLET ORAL
Qty: 0 | Refills: 0 | DISCHARGE

## 2019-01-01 RX ORDER — AMLODIPINE BESYLATE 2.5 MG/1
5 TABLET ORAL DAILY
Refills: 0 | Status: DISCONTINUED | OUTPATIENT
Start: 2019-01-01 | End: 2019-01-01

## 2019-01-01 RX ORDER — ALPRAZOLAM 0.25 MG
0 TABLET ORAL
Qty: 0 | Refills: 0 | DISCHARGE

## 2019-01-01 RX ORDER — DIAZEPAM 5 MG
10 TABLET ORAL EVERY 12 HOURS
Refills: 0 | Status: DISCONTINUED | OUTPATIENT
Start: 2019-01-01 | End: 2019-01-01

## 2019-01-01 RX ORDER — LEVOTHYROXINE SODIUM 125 MCG
100 TABLET ORAL DAILY
Refills: 0 | Status: DISCONTINUED | OUTPATIENT
Start: 2019-01-01 | End: 2019-01-01

## 2019-01-01 RX ORDER — MAGNESIUM SULFATE 500 MG/ML
2 VIAL (ML) INJECTION EVERY 6 HOURS
Refills: 0 | Status: DISCONTINUED | OUTPATIENT
Start: 2019-01-01 | End: 2019-01-01

## 2019-01-01 RX ORDER — MEPERIDINE HYDROCHLORIDE 50 MG/ML
12.5 INJECTION INTRAMUSCULAR; INTRAVENOUS; SUBCUTANEOUS
Refills: 0 | Status: DISCONTINUED | OUTPATIENT
Start: 2019-01-01 | End: 2019-01-01

## 2019-01-01 RX ORDER — ASCORBIC ACID 60 MG
200 TABLET,CHEWABLE ORAL DAILY
Refills: 0 | Status: DISCONTINUED | OUTPATIENT
Start: 2019-01-01 | End: 2019-01-01

## 2019-01-01 RX ORDER — CHOLECALCIFEROL (VITAMIN D3) 125 MCG
4000 CAPSULE ORAL DAILY
Refills: 0 | Status: DISCONTINUED | OUTPATIENT
Start: 2019-01-01 | End: 2019-01-01

## 2019-01-01 RX ORDER — HEPARIN SODIUM 5000 [USP'U]/ML
5000 INJECTION INTRAVENOUS; SUBCUTANEOUS EVERY 8 HOURS
Refills: 0 | Status: DISCONTINUED | OUTPATIENT
Start: 2019-01-01 | End: 2020-01-01

## 2019-01-01 RX ORDER — CHOLECALCIFEROL (VITAMIN D3) 125 MCG
1000 CAPSULE ORAL
Qty: 30 | Refills: 0
Start: 2019-01-01

## 2019-01-01 RX ORDER — SEVELAMER CARBONATE 2400 MG/1
1600 POWDER, FOR SUSPENSION ORAL
Refills: 0 | Status: DISCONTINUED | OUTPATIENT
Start: 2019-01-01 | End: 2019-01-01

## 2019-01-01 RX ORDER — GABAPENTIN 400 MG/1
800 CAPSULE ORAL THREE TIMES A DAY
Refills: 0 | Status: DISCONTINUED | OUTPATIENT
Start: 2019-01-01 | End: 2019-01-01

## 2019-01-01 RX ORDER — HYDROCORTISONE 20 MG
20 TABLET ORAL DAILY
Refills: 0 | Status: DISCONTINUED | OUTPATIENT
Start: 2019-01-01 | End: 2019-01-01

## 2019-01-01 RX ORDER — DRONABINOL 2.5 MG
2.5 CAPSULE ORAL DAILY
Refills: 0 | Status: DISCONTINUED | OUTPATIENT
Start: 2019-01-01 | End: 2019-01-01

## 2019-01-01 RX ORDER — ALPRAZOLAM 0.25 MG
1 TABLET ORAL AT BEDTIME
Refills: 0 | Status: DISCONTINUED | OUTPATIENT
Start: 2019-01-01 | End: 2019-01-01

## 2019-01-01 RX ORDER — VANCOMYCIN HCL 1 G
750 VIAL (EA) INTRAVENOUS ONCE
Refills: 0 | Status: COMPLETED | OUTPATIENT
Start: 2019-01-01 | End: 2019-01-01

## 2019-01-01 RX ORDER — COLLAGENASE SANTYL 250 [ARB'U]/G
250 OINTMENT TOPICAL DAILY
Qty: 3 | Refills: 0 | Status: ACTIVE | COMMUNITY
Start: 2019-01-01 | End: 1900-01-01

## 2019-01-01 RX ORDER — ONDANSETRON 8 MG/1
4 TABLET, FILM COATED ORAL EVERY 8 HOURS
Refills: 0 | Status: DISCONTINUED | OUTPATIENT
Start: 2019-01-01 | End: 2019-01-01

## 2019-01-01 RX ORDER — SODIUM BICARBONATE 1 MEQ/ML
650 SYRINGE (ML) INTRAVENOUS
Refills: 0 | Status: DISCONTINUED | OUTPATIENT
Start: 2019-01-01 | End: 2019-01-01

## 2019-01-01 RX ORDER — MEROPENEM 1 G/30ML
1000 INJECTION INTRAVENOUS EVERY 24 HOURS
Refills: 0 | Status: DISCONTINUED | OUTPATIENT
Start: 2019-01-01 | End: 2019-01-01

## 2019-01-01 RX ORDER — AMLODIPINE BESYLATE 2.5 MG/1
5 TABLET ORAL ONCE
Refills: 0 | Status: COMPLETED | OUTPATIENT
Start: 2019-01-01 | End: 2019-01-01

## 2019-01-01 RX ORDER — SODIUM CHLORIDE 9 MG/ML
250 INJECTION, SOLUTION INTRAVENOUS ONCE
Refills: 0 | Status: COMPLETED | OUTPATIENT
Start: 2019-01-01 | End: 2019-01-01

## 2019-01-01 RX ORDER — ASCORBIC ACID 60 MG
1 TABLET,CHEWABLE ORAL
Qty: 30 | Refills: 0
Start: 2019-01-01

## 2019-01-01 RX ORDER — CEFTRIAXONE 500 MG/1
2 INJECTION, POWDER, FOR SOLUTION INTRAMUSCULAR; INTRAVENOUS EVERY 24 HOURS
Refills: 0 | Status: DISCONTINUED | OUTPATIENT
Start: 2019-01-01 | End: 2019-01-01

## 2019-01-01 RX ORDER — PANTOPRAZOLE SODIUM 20 MG/1
40 TABLET, DELAYED RELEASE ORAL EVERY 12 HOURS
Refills: 0 | Status: DISCONTINUED | OUTPATIENT
Start: 2019-01-01 | End: 2019-01-01

## 2019-01-01 RX ORDER — CEFTRIAXONE 500 MG/1
2000 INJECTION, POWDER, FOR SOLUTION INTRAMUSCULAR; INTRAVENOUS EVERY 24 HOURS
Refills: 0 | Status: DISCONTINUED | OUTPATIENT
Start: 2019-01-01 | End: 2019-01-01

## 2019-01-01 RX ORDER — FLUDROCORTISONE ACETATE 0.1 MG/1
0.1 TABLET ORAL DAILY
Refills: 0 | Status: DISCONTINUED | OUTPATIENT
Start: 2019-01-01 | End: 2019-01-01

## 2019-01-01 RX ORDER — FOLIC ACID 0.8 MG
1 TABLET ORAL DAILY
Refills: 0 | Status: DISCONTINUED | OUTPATIENT
Start: 2019-01-01 | End: 2019-01-01

## 2019-01-01 RX ORDER — SODIUM HYPOCHLORITE 0.125 %
1 SOLUTION, NON-ORAL MISCELLANEOUS
Qty: 0 | Refills: 0 | DISCHARGE
Start: 2019-01-01

## 2019-01-01 RX ORDER — DEXMEDETOMIDINE HYDROCHLORIDE IN 0.9% SODIUM CHLORIDE 4 UG/ML
0.2 INJECTION INTRAVENOUS
Qty: 200 | Refills: 0 | Status: DISCONTINUED | OUTPATIENT
Start: 2019-01-01 | End: 2019-01-01

## 2019-01-01 RX ORDER — SODIUM CHLORIDE 9 MG/ML
1000 INJECTION INTRAMUSCULAR; INTRAVENOUS; SUBCUTANEOUS ONCE
Refills: 0 | Status: DISCONTINUED | OUTPATIENT
Start: 2019-01-01 | End: 2019-01-01

## 2019-01-01 RX ORDER — VANCOMYCIN HCL 1 G
500 VIAL (EA) INTRAVENOUS EVERY 24 HOURS
Refills: 0 | Status: DISCONTINUED | OUTPATIENT
Start: 2019-01-01 | End: 2019-01-01

## 2019-01-01 RX ORDER — ONDANSETRON 8 MG/1
4 TABLET, FILM COATED ORAL EVERY 6 HOURS
Refills: 0 | Status: DISCONTINUED | OUTPATIENT
Start: 2019-01-01 | End: 2019-01-01

## 2019-01-01 RX ORDER — PIPERACILLIN AND TAZOBACTAM 4; .5 G/20ML; G/20ML
4.5 INJECTION, POWDER, LYOPHILIZED, FOR SOLUTION INTRAVENOUS ONCE
Refills: 0 | Status: COMPLETED | OUTPATIENT
Start: 2019-01-01 | End: 2019-01-01

## 2019-01-01 RX ORDER — CALCIUM CARBONATE 500(1250)
1 TABLET ORAL EVERY 8 HOURS
Refills: 0 | Status: DISCONTINUED | OUTPATIENT
Start: 2019-01-01 | End: 2019-01-01

## 2019-01-01 RX ORDER — OXYCODONE HYDROCHLORIDE 5 MG/1
50 TABLET ORAL EVERY 6 HOURS
Refills: 0 | Status: DISCONTINUED | OUTPATIENT
Start: 2019-01-01 | End: 2019-01-01

## 2019-01-01 RX ORDER — LEVOTHYROXINE SODIUM 125 MCG
1 TABLET ORAL
Qty: 0 | Refills: 0 | DISCHARGE

## 2019-01-01 RX ORDER — SODIUM BICARBONATE 1 MEQ/ML
650 SYRINGE (ML) INTRAVENOUS THREE TIMES A DAY
Refills: 0 | Status: COMPLETED | OUTPATIENT
Start: 2019-01-01 | End: 2019-01-01

## 2019-01-01 RX ORDER — COLLAGENASE CLOSTRIDIUM HIST. 250 UNIT/G
1 OINTMENT (GRAM) TOPICAL DAILY
Refills: 0 | Status: DISCONTINUED | OUTPATIENT
Start: 2019-01-01 | End: 2019-01-01

## 2019-01-01 RX ORDER — METHADONE HYDROCHLORIDE 40 MG/1
80 TABLET ORAL EVERY 8 HOURS
Refills: 0 | Status: DISCONTINUED | OUTPATIENT
Start: 2019-01-01 | End: 2020-01-01

## 2019-01-01 RX ORDER — OXYCODONE HYDROCHLORIDE 5 MG/1
20 TABLET ORAL EVERY 4 HOURS
Refills: 0 | Status: DISCONTINUED | OUTPATIENT
Start: 2019-01-01 | End: 2019-01-01

## 2019-01-01 RX ORDER — ACETAMINOPHEN 500 MG
650 TABLET ORAL ONCE
Refills: 0 | Status: COMPLETED | OUTPATIENT
Start: 2019-01-01 | End: 2019-01-01

## 2019-01-01 RX ORDER — NALOXONE HYDROCHLORIDE 4 MG/.1ML
0.04 SPRAY NASAL ONCE
Refills: 0 | Status: COMPLETED | OUTPATIENT
Start: 2019-01-01 | End: 2019-01-01

## 2019-01-01 RX ORDER — VANCOMYCIN HCL 1 G
750 VIAL (EA) INTRAVENOUS EVERY 24 HOURS
Refills: 0 | Status: DISCONTINUED | OUTPATIENT
Start: 2019-01-01 | End: 2019-01-01

## 2019-01-01 RX ORDER — DIAZEPAM 5 MG
0 TABLET ORAL
Qty: 0 | Refills: 0 | DISCHARGE

## 2019-01-01 RX ORDER — ETOMIDATE 2 MG/ML
15 INJECTION INTRAVENOUS ONCE
Refills: 0 | Status: COMPLETED | OUTPATIENT
Start: 2019-01-01 | End: 2019-01-01

## 2019-01-01 RX ORDER — ACETAMINOPHEN 500 MG
650 TABLET ORAL EVERY 6 HOURS
Refills: 0 | Status: DISCONTINUED | OUTPATIENT
Start: 2019-01-01 | End: 2020-01-01

## 2019-01-01 RX ORDER — OXYBUTYNIN CHLORIDE 5 MG
10 TABLET ORAL
Refills: 0 | Status: DISCONTINUED | OUTPATIENT
Start: 2019-01-01 | End: 2019-01-01

## 2019-01-01 RX ORDER — FLUMAZENIL 0.1 MG/ML
0.5 VIAL (ML) INTRAVENOUS ONCE
Refills: 0 | Status: COMPLETED | OUTPATIENT
Start: 2019-01-01 | End: 2019-01-01

## 2019-01-01 RX ORDER — LEVOTHYROXINE SODIUM 125 MCG
50 TABLET ORAL ONCE
Refills: 0 | Status: COMPLETED | OUTPATIENT
Start: 2019-01-01 | End: 2019-01-01

## 2019-01-01 RX ORDER — CEFTRIAXONE 500 MG/1
1000 INJECTION, POWDER, FOR SOLUTION INTRAMUSCULAR; INTRAVENOUS ONCE
Refills: 0 | Status: COMPLETED | OUTPATIENT
Start: 2019-01-01 | End: 2019-01-01

## 2019-01-01 RX ORDER — DEXTROSE MONOHYDRATE, SODIUM CHLORIDE, AND POTASSIUM CHLORIDE 50; .745; 4.5 G/1000ML; G/1000ML; G/1000ML
1000 INJECTION, SOLUTION INTRAVENOUS
Refills: 0 | Status: DISCONTINUED | OUTPATIENT
Start: 2019-01-01 | End: 2019-01-01

## 2019-01-01 RX ORDER — DIAZEPAM 5 MG
5 TABLET ORAL
Refills: 0 | Status: DISCONTINUED | OUTPATIENT
Start: 2019-01-01 | End: 2020-01-01

## 2019-01-01 RX ORDER — HYDROCORTISONE 20 MG
10 TABLET ORAL
Refills: 0 | Status: DISCONTINUED | OUTPATIENT
Start: 2019-01-01 | End: 2019-01-01

## 2019-01-01 RX ORDER — POTASSIUM CHLORIDE 20 MEQ
20 PACKET (EA) ORAL
Refills: 0 | Status: COMPLETED | OUTPATIENT
Start: 2019-01-01 | End: 2019-01-01

## 2019-01-01 RX ORDER — VANCOMYCIN HCL 1 G
250 VIAL (EA) INTRAVENOUS ONCE
Refills: 0 | Status: COMPLETED | OUTPATIENT
Start: 2019-01-01 | End: 2019-01-01

## 2019-01-01 RX ORDER — METHADONE HYDROCHLORIDE 40 MG/1
10 TABLET ORAL
Qty: 0 | Refills: 0 | DISCHARGE

## 2019-01-01 RX ORDER — CEFEPIME 1 G/1
2000 INJECTION, POWDER, FOR SOLUTION INTRAMUSCULAR; INTRAVENOUS ONCE
Refills: 0 | Status: DISCONTINUED | OUTPATIENT
Start: 2019-01-01 | End: 2019-01-01

## 2019-01-01 RX ORDER — MORPHINE SULFATE 50 MG/1
2 CAPSULE, EXTENDED RELEASE ORAL EVERY 4 HOURS
Refills: 0 | Status: DISCONTINUED | OUTPATIENT
Start: 2019-01-01 | End: 2019-01-01

## 2019-01-01 RX ORDER — FLUDROCORTISONE ACETATE 0.1 MG/1
0.1 TABLET ORAL EVERY 12 HOURS
Refills: 0 | Status: DISCONTINUED | OUTPATIENT
Start: 2019-01-01 | End: 2019-01-01

## 2019-01-01 RX ORDER — VANCOMYCIN HCL 1 G
500 VIAL (EA) INTRAVENOUS DAILY
Refills: 0 | Status: DISCONTINUED | OUTPATIENT
Start: 2019-01-01 | End: 2019-01-01

## 2019-01-01 RX ORDER — SODIUM BICARBONATE 1 MEQ/ML
1 SYRINGE (ML) INTRAVENOUS
Qty: 90 | Refills: 0
Start: 2019-01-01 | End: 2019-01-01

## 2019-01-01 RX ORDER — LIOTHYRONINE SODIUM 25 UG/1
25 TABLET ORAL DAILY
Refills: 0 | Status: DISCONTINUED | OUTPATIENT
Start: 2019-01-01 | End: 2019-01-01

## 2019-01-01 RX ORDER — VANCOMYCIN HCL 1 G
1000 VIAL (EA) INTRAVENOUS DAILY
Refills: 0 | Status: DISCONTINUED | OUTPATIENT
Start: 2019-01-01 | End: 2019-01-01

## 2019-01-01 RX ORDER — PANTOPRAZOLE SODIUM 20 MG/1
40 TABLET, DELAYED RELEASE ORAL
Refills: 0 | Status: DISCONTINUED | OUTPATIENT
Start: 2019-01-01 | End: 2020-01-01

## 2019-01-01 RX ORDER — METHADONE HYDROCHLORIDE 40 MG/1
80 TABLET ORAL THREE TIMES A DAY
Refills: 0 | Status: DISCONTINUED | OUTPATIENT
Start: 2019-01-01 | End: 2019-01-01

## 2019-01-01 RX ORDER — OXYCODONE HYDROCHLORIDE 5 MG/1
40 TABLET ORAL EVERY 8 HOURS
Refills: 0 | Status: DISCONTINUED | OUTPATIENT
Start: 2019-01-01 | End: 2019-01-01

## 2019-01-01 RX ORDER — NOREPINEPHRINE BITARTRATE/D5W 8 MG/250ML
0.05 PLASTIC BAG, INJECTION (ML) INTRAVENOUS
Qty: 8 | Refills: 0 | Status: DISCONTINUED | OUTPATIENT
Start: 2019-01-01 | End: 2019-01-01

## 2019-01-01 RX ORDER — FLUMAZENIL 0.1 MG/ML
0.5 VIAL (ML) INTRAVENOUS ONCE
Refills: 0 | Status: DISCONTINUED | OUTPATIENT
Start: 2019-01-01 | End: 2019-01-01

## 2019-01-01 RX ORDER — SODIUM HYPOCHLORITE 0.125 %
1 SOLUTION, NON-ORAL MISCELLANEOUS EVERY 12 HOURS
Refills: 0 | Status: DISCONTINUED | OUTPATIENT
Start: 2019-01-01 | End: 2019-01-01

## 2019-01-01 RX ORDER — GABAPENTIN 400 MG/1
1 CAPSULE ORAL
Qty: 0 | Refills: 0 | DISCHARGE

## 2019-01-01 RX ORDER — CALCIUM ACETATE 667 MG
1 TABLET ORAL
Qty: 90 | Refills: 0
Start: 2019-01-01 | End: 2019-01-01

## 2019-01-01 RX ORDER — CHLORHEXIDINE GLUCONATE 213 G/1000ML
1 SOLUTION TOPICAL
Refills: 0 | Status: DISCONTINUED | OUTPATIENT
Start: 2019-01-01 | End: 2020-01-01

## 2019-01-01 RX ADMIN — Medication 25 MILLIGRAM(S): at 06:24

## 2019-01-01 RX ADMIN — Medication 100 MILLIGRAM(S): at 05:18

## 2019-01-01 RX ADMIN — MORPHINE SULFATE 2 MILLIGRAM(S): 50 CAPSULE, EXTENDED RELEASE ORAL at 11:09

## 2019-01-01 RX ADMIN — METHADONE HYDROCHLORIDE 40 MILLIGRAM(S): 40 TABLET ORAL at 21:04

## 2019-01-01 RX ADMIN — ERYTHROPOIETIN 5000 UNIT(S): 10000 INJECTION, SOLUTION INTRAVENOUS; SUBCUTANEOUS at 12:53

## 2019-01-01 RX ADMIN — SODIUM CHLORIDE 3000 MILLILITER(S): 9 INJECTION INTRAMUSCULAR; INTRAVENOUS; SUBCUTANEOUS at 19:29

## 2019-01-01 RX ADMIN — HEPARIN SODIUM 5000 UNIT(S): 5000 INJECTION INTRAVENOUS; SUBCUTANEOUS at 21:09

## 2019-01-01 RX ADMIN — METHADONE HYDROCHLORIDE 80 MILLIGRAM(S): 40 TABLET ORAL at 17:35

## 2019-01-01 RX ADMIN — METHADONE HYDROCHLORIDE 80 MILLIGRAM(S): 40 TABLET ORAL at 06:45

## 2019-01-01 RX ADMIN — Medication 100 MILLIGRAM(S): at 13:26

## 2019-01-01 RX ADMIN — Medication 200 MEQ/KG/HR: at 22:33

## 2019-01-01 RX ADMIN — Medication 500 MILLIGRAM(S): at 11:33

## 2019-01-01 RX ADMIN — Medication 1 MILLIGRAM(S): at 11:32

## 2019-01-01 RX ADMIN — HEPARIN SODIUM 5000 UNIT(S): 5000 INJECTION INTRAVENOUS; SUBCUTANEOUS at 15:02

## 2019-01-01 RX ADMIN — METHADONE HYDROCHLORIDE 80 MILLIGRAM(S): 40 TABLET ORAL at 21:53

## 2019-01-01 RX ADMIN — Medication 5 MILLIGRAM(S): at 12:15

## 2019-01-01 RX ADMIN — Medication 650 MILLIGRAM(S): at 21:22

## 2019-01-01 RX ADMIN — METHADONE HYDROCHLORIDE 80 MILLIGRAM(S): 40 TABLET ORAL at 18:01

## 2019-01-01 RX ADMIN — Medication 10 MILLIGRAM(S): at 17:02

## 2019-01-01 RX ADMIN — SODIUM CHLORIDE 125 MILLILITER(S): 9 INJECTION, SOLUTION INTRAVENOUS at 23:25

## 2019-01-01 RX ADMIN — Medication 650 MILLIGRAM(S): at 23:56

## 2019-01-01 RX ADMIN — MORPHINE SULFATE 2 MILLIGRAM(S): 50 CAPSULE, EXTENDED RELEASE ORAL at 18:30

## 2019-01-01 RX ADMIN — HEPARIN SODIUM 5000 UNIT(S): 5000 INJECTION INTRAVENOUS; SUBCUTANEOUS at 16:24

## 2019-01-01 RX ADMIN — Medication 650 MILLIGRAM(S): at 22:04

## 2019-01-01 RX ADMIN — Medication 250 MILLIGRAM(S): at 00:34

## 2019-01-01 RX ADMIN — CHLORHEXIDINE GLUCONATE 1 APPLICATION(S): 213 SOLUTION TOPICAL at 06:24

## 2019-01-01 RX ADMIN — METHADONE HYDROCHLORIDE 80 MILLIGRAM(S): 40 TABLET ORAL at 21:55

## 2019-01-01 RX ADMIN — Medication 5 MILLIGRAM(S): at 01:54

## 2019-01-01 RX ADMIN — METHADONE HYDROCHLORIDE 80 MILLIGRAM(S): 40 TABLET ORAL at 05:44

## 2019-01-01 RX ADMIN — METHADONE HYDROCHLORIDE 40 MILLIGRAM(S): 40 TABLET ORAL at 13:44

## 2019-01-01 RX ADMIN — Medication 650 MILLIGRAM(S): at 14:40

## 2019-01-01 RX ADMIN — Medication 5 MILLIGRAM(S): at 22:39

## 2019-01-01 RX ADMIN — Medication 10 MILLIGRAM(S): at 05:42

## 2019-01-01 RX ADMIN — SODIUM CHLORIDE 75 MILLILITER(S): 9 INJECTION, SOLUTION INTRAVENOUS at 07:44

## 2019-01-01 RX ADMIN — Medication 1 TABLET(S): at 21:43

## 2019-01-01 RX ADMIN — OXYCODONE HYDROCHLORIDE 20 MILLIGRAM(S): 5 TABLET ORAL at 04:15

## 2019-01-01 RX ADMIN — Medication 650 MILLIGRAM(S): at 14:07

## 2019-01-01 RX ADMIN — Medication 2.5 MILLIGRAM(S): at 11:47

## 2019-01-01 RX ADMIN — METHADONE HYDROCHLORIDE 80 MILLIGRAM(S): 40 TABLET ORAL at 13:47

## 2019-01-01 RX ADMIN — METHADONE HYDROCHLORIDE 80 MILLIGRAM(S): 40 TABLET ORAL at 05:41

## 2019-01-01 RX ADMIN — Medication 650 MILLIGRAM(S): at 05:43

## 2019-01-01 RX ADMIN — Medication 1000 UNIT(S): at 11:05

## 2019-01-01 RX ADMIN — Medication 1 TABLET(S): at 21:37

## 2019-01-01 RX ADMIN — Medication 50 GRAM(S): at 22:34

## 2019-01-01 RX ADMIN — MORPHINE SULFATE 2 MILLIGRAM(S): 50 CAPSULE, EXTENDED RELEASE ORAL at 05:27

## 2019-01-01 RX ADMIN — Medication 2.5 MILLIGRAM(S): at 17:04

## 2019-01-01 RX ADMIN — Medication 5 MILLIGRAM(S): at 15:20

## 2019-01-01 RX ADMIN — Medication 1 TABLET(S): at 05:44

## 2019-01-01 RX ADMIN — METHADONE HYDROCHLORIDE 80 MILLIGRAM(S): 40 TABLET ORAL at 01:17

## 2019-01-01 RX ADMIN — MORPHINE SULFATE 2 MILLIGRAM(S): 50 CAPSULE, EXTENDED RELEASE ORAL at 04:51

## 2019-01-01 RX ADMIN — METHADONE HYDROCHLORIDE 80 MILLIGRAM(S): 40 TABLET ORAL at 23:15

## 2019-01-01 RX ADMIN — Medication 667 MILLIGRAM(S): at 11:50

## 2019-01-01 RX ADMIN — Medication 5 MILLIGRAM(S): at 13:26

## 2019-01-01 RX ADMIN — MORPHINE SULFATE 2 MILLIGRAM(S): 50 CAPSULE, EXTENDED RELEASE ORAL at 07:51

## 2019-01-01 RX ADMIN — SODIUM CHLORIDE 75 MILLILITER(S): 9 INJECTION, SOLUTION INTRAVENOUS at 06:54

## 2019-01-01 RX ADMIN — Medication 25 MILLIGRAM(S): at 21:40

## 2019-01-01 RX ADMIN — Medication 667 MILLIGRAM(S): at 17:34

## 2019-01-01 RX ADMIN — CEFTRIAXONE 100 MILLIGRAM(S): 500 INJECTION, POWDER, FOR SOLUTION INTRAMUSCULAR; INTRAVENOUS at 17:44

## 2019-01-01 RX ADMIN — Medication 10 MILLIGRAM(S): at 06:09

## 2019-01-01 RX ADMIN — Medication 1 TABLET(S): at 14:09

## 2019-01-01 RX ADMIN — OXYCODONE HYDROCHLORIDE 20 MILLIGRAM(S): 5 TABLET ORAL at 22:20

## 2019-01-01 RX ADMIN — Medication 650 MILLIGRAM(S): at 06:15

## 2019-01-01 RX ADMIN — OXYCODONE HYDROCHLORIDE 60 MILLIGRAM(S): 5 TABLET ORAL at 01:17

## 2019-01-01 RX ADMIN — Medication 100 MILLIGRAM(S): at 22:08

## 2019-01-01 RX ADMIN — HEPARIN SODIUM 5000 UNIT(S): 5000 INJECTION INTRAVENOUS; SUBCUTANEOUS at 05:17

## 2019-01-01 RX ADMIN — Medication 650 MILLIGRAM(S): at 12:11

## 2019-01-01 RX ADMIN — Medication 650 MILLIGRAM(S): at 05:25

## 2019-01-01 RX ADMIN — MORPHINE SULFATE 2 MILLIGRAM(S): 50 CAPSULE, EXTENDED RELEASE ORAL at 19:11

## 2019-01-01 RX ADMIN — Medication 10 MILLIGRAM(S): at 05:32

## 2019-01-01 RX ADMIN — SODIUM CHLORIDE 75 MILLILITER(S): 9 INJECTION, SOLUTION INTRAVENOUS at 21:36

## 2019-01-01 RX ADMIN — HEPARIN SODIUM 5000 UNIT(S): 5000 INJECTION INTRAVENOUS; SUBCUTANEOUS at 05:56

## 2019-01-01 RX ADMIN — MORPHINE SULFATE 2 MILLIGRAM(S): 50 CAPSULE, EXTENDED RELEASE ORAL at 17:16

## 2019-01-01 RX ADMIN — Medication 10 MILLIGRAM(S): at 17:28

## 2019-01-01 RX ADMIN — Medication 100 MILLIGRAM(S): at 13:09

## 2019-01-01 RX ADMIN — OXYCODONE HYDROCHLORIDE 60 MILLIGRAM(S): 5 TABLET ORAL at 11:54

## 2019-01-01 RX ADMIN — Medication 100 MILLIGRAM(S): at 13:52

## 2019-01-01 RX ADMIN — HEPARIN SODIUM 5000 UNIT(S): 5000 INJECTION INTRAVENOUS; SUBCUTANEOUS at 13:45

## 2019-01-01 RX ADMIN — OXYCODONE HYDROCHLORIDE 20 MILLIGRAM(S): 5 TABLET ORAL at 15:28

## 2019-01-01 RX ADMIN — Medication 650 MILLIGRAM(S): at 13:45

## 2019-01-01 RX ADMIN — Medication 1 APPLICATION(S): at 05:26

## 2019-01-01 RX ADMIN — Medication 5 MILLIGRAM(S): at 05:11

## 2019-01-01 RX ADMIN — Medication 5 MILLIGRAM(S): at 11:36

## 2019-01-01 RX ADMIN — Medication 100 MILLIGRAM(S): at 05:29

## 2019-01-01 RX ADMIN — Medication 100 MILLIGRAM(S): at 21:34

## 2019-01-01 RX ADMIN — METHADONE HYDROCHLORIDE 10 MILLIGRAM(S): 40 TABLET ORAL at 13:10

## 2019-01-01 RX ADMIN — SODIUM CHLORIDE 75 MILLILITER(S): 9 INJECTION, SOLUTION INTRAVENOUS at 05:27

## 2019-01-01 RX ADMIN — METHADONE HYDROCHLORIDE 80 MILLIGRAM(S): 40 TABLET ORAL at 21:31

## 2019-01-01 RX ADMIN — CEFTRIAXONE 100 MILLIGRAM(S): 500 INJECTION, POWDER, FOR SOLUTION INTRAMUSCULAR; INTRAVENOUS at 23:42

## 2019-01-01 RX ADMIN — METHADONE HYDROCHLORIDE 40 MILLIGRAM(S): 40 TABLET ORAL at 17:34

## 2019-01-01 RX ADMIN — OXYCODONE HYDROCHLORIDE 60 MILLIGRAM(S): 5 TABLET ORAL at 10:37

## 2019-01-01 RX ADMIN — MORPHINE SULFATE 2 MILLIGRAM(S): 50 CAPSULE, EXTENDED RELEASE ORAL at 20:09

## 2019-01-01 RX ADMIN — Medication 1 APPLICATION(S): at 18:00

## 2019-01-01 RX ADMIN — OXYCODONE HYDROCHLORIDE 50 MILLIGRAM(S): 5 TABLET ORAL at 12:59

## 2019-01-01 RX ADMIN — ONDANSETRON 4 MILLIGRAM(S): 8 TABLET, FILM COATED ORAL at 14:44

## 2019-01-01 RX ADMIN — GABAPENTIN 200 MILLIGRAM(S): 400 CAPSULE ORAL at 12:01

## 2019-01-01 RX ADMIN — OXYCODONE HYDROCHLORIDE 20 MILLIGRAM(S): 5 TABLET ORAL at 14:07

## 2019-01-01 RX ADMIN — HEPARIN SODIUM 5000 UNIT(S): 5000 INJECTION INTRAVENOUS; SUBCUTANEOUS at 21:59

## 2019-01-01 RX ADMIN — Medication 25 MILLIGRAM(S): at 21:23

## 2019-01-01 RX ADMIN — Medication 100 MILLIGRAM(S): at 22:04

## 2019-01-01 RX ADMIN — HEPARIN SODIUM 5000 UNIT(S): 5000 INJECTION INTRAVENOUS; SUBCUTANEOUS at 14:06

## 2019-01-01 RX ADMIN — CEFEPIME 100 MILLIGRAM(S): 1 INJECTION, POWDER, FOR SOLUTION INTRAMUSCULAR; INTRAVENOUS at 21:26

## 2019-01-01 RX ADMIN — FLUDROCORTISONE ACETATE 0.1 MILLIGRAM(S): 0.1 TABLET ORAL at 05:16

## 2019-01-01 RX ADMIN — OXYCODONE HYDROCHLORIDE 60 MILLIGRAM(S): 5 TABLET ORAL at 13:35

## 2019-01-01 RX ADMIN — MORPHINE SULFATE 2 MILLIGRAM(S): 50 CAPSULE, EXTENDED RELEASE ORAL at 12:52

## 2019-01-01 RX ADMIN — OXYCODONE HYDROCHLORIDE 20 MILLIGRAM(S): 5 TABLET ORAL at 05:17

## 2019-01-01 RX ADMIN — Medication 1 TABLET(S): at 05:21

## 2019-01-01 RX ADMIN — Medication 1 TABLET(S): at 13:14

## 2019-01-01 RX ADMIN — GABAPENTIN 200 MILLIGRAM(S): 400 CAPSULE ORAL at 12:25

## 2019-01-01 RX ADMIN — HEPARIN SODIUM 5000 UNIT(S): 5000 INJECTION INTRAVENOUS; SUBCUTANEOUS at 21:32

## 2019-01-01 RX ADMIN — HEPARIN SODIUM 5000 UNIT(S): 5000 INJECTION INTRAVENOUS; SUBCUTANEOUS at 06:09

## 2019-01-01 RX ADMIN — Medication 650 MILLIGRAM(S): at 21:14

## 2019-01-01 RX ADMIN — OXYCODONE HYDROCHLORIDE 30 MILLIGRAM(S): 5 TABLET ORAL at 20:29

## 2019-01-01 RX ADMIN — METHADONE HYDROCHLORIDE 10 MILLIGRAM(S): 40 TABLET ORAL at 15:53

## 2019-01-01 RX ADMIN — Medication 500 MILLILITER(S): at 12:05

## 2019-01-01 RX ADMIN — Medication 100 MILLIGRAM(S): at 14:12

## 2019-01-01 RX ADMIN — METHADONE HYDROCHLORIDE 80 MILLIGRAM(S): 40 TABLET ORAL at 23:55

## 2019-01-01 RX ADMIN — METHADONE HYDROCHLORIDE 40 MILLIGRAM(S): 40 TABLET ORAL at 15:14

## 2019-01-01 RX ADMIN — METHADONE HYDROCHLORIDE 80 MILLIGRAM(S): 40 TABLET ORAL at 12:24

## 2019-01-01 RX ADMIN — OXYCODONE HYDROCHLORIDE 40 MILLIGRAM(S): 5 TABLET ORAL at 05:01

## 2019-01-01 RX ADMIN — SODIUM CHLORIDE 75 MILLILITER(S): 9 INJECTION, SOLUTION INTRAVENOUS at 06:20

## 2019-01-01 RX ADMIN — SODIUM CHLORIDE 75 MILLILITER(S): 9 INJECTION, SOLUTION INTRAVENOUS at 10:06

## 2019-01-01 RX ADMIN — Medication 1 TABLET(S): at 05:34

## 2019-01-01 RX ADMIN — OXYCODONE HYDROCHLORIDE 20 MILLIGRAM(S): 5 TABLET ORAL at 09:48

## 2019-01-01 RX ADMIN — MEROPENEM 100 MILLIGRAM(S): 1 INJECTION INTRAVENOUS at 05:21

## 2019-01-01 RX ADMIN — Medication 5 MILLIGRAM(S): at 06:23

## 2019-01-01 RX ADMIN — Medication 100 MILLIGRAM(S): at 05:10

## 2019-01-01 RX ADMIN — Medication 20 MILLIGRAM(S): at 05:43

## 2019-01-01 RX ADMIN — Medication 1000 MILLILITER(S): at 21:36

## 2019-01-01 RX ADMIN — Medication 500 MILLILITER(S): at 08:48

## 2019-01-01 RX ADMIN — Medication 5 MILLIGRAM(S): at 05:38

## 2019-01-01 RX ADMIN — Medication 50 GRAM(S): at 17:00

## 2019-01-01 RX ADMIN — HEPARIN SODIUM 5000 UNIT(S): 5000 INJECTION INTRAVENOUS; SUBCUTANEOUS at 17:20

## 2019-01-01 RX ADMIN — MORPHINE SULFATE 2 MILLIGRAM(S): 50 CAPSULE, EXTENDED RELEASE ORAL at 05:13

## 2019-01-01 RX ADMIN — CEFEPIME 100 MILLIGRAM(S): 1 INJECTION, POWDER, FOR SOLUTION INTRAMUSCULAR; INTRAVENOUS at 22:08

## 2019-01-01 RX ADMIN — OXYCODONE HYDROCHLORIDE 40 MILLIGRAM(S): 5 TABLET ORAL at 02:04

## 2019-01-01 RX ADMIN — OXYCODONE HYDROCHLORIDE 20 MILLIGRAM(S): 5 TABLET ORAL at 07:21

## 2019-01-01 RX ADMIN — PANTOPRAZOLE SODIUM 40 MILLIGRAM(S): 20 TABLET, DELAYED RELEASE ORAL at 05:56

## 2019-01-01 RX ADMIN — NYSTATIN CREAM 1 APPLICATION(S): 100000 CREAM TOPICAL at 17:04

## 2019-01-01 RX ADMIN — PANTOPRAZOLE SODIUM 40 MILLIGRAM(S): 20 TABLET, DELAYED RELEASE ORAL at 06:17

## 2019-01-01 RX ADMIN — Medication 650 MILLIGRAM(S): at 05:51

## 2019-01-01 RX ADMIN — Medication 2 MILLIGRAM(S): at 21:28

## 2019-01-01 RX ADMIN — Medication 100 MILLIGRAM(S): at 05:52

## 2019-01-01 RX ADMIN — OXYCODONE HYDROCHLORIDE 60 MILLIGRAM(S): 5 TABLET ORAL at 11:42

## 2019-01-01 RX ADMIN — Medication 4000 UNIT(S): at 11:57

## 2019-01-01 RX ADMIN — HEPARIN SODIUM 5000 UNIT(S): 5000 INJECTION INTRAVENOUS; SUBCUTANEOUS at 05:24

## 2019-01-01 RX ADMIN — LIOTHYRONINE SODIUM 25 MICROGRAM(S): 25 TABLET ORAL at 05:06

## 2019-01-01 RX ADMIN — CEFTRIAXONE 100 MILLIGRAM(S): 500 INJECTION, POWDER, FOR SOLUTION INTRAMUSCULAR; INTRAVENOUS at 17:41

## 2019-01-01 RX ADMIN — OXYCODONE HYDROCHLORIDE 50 MILLIGRAM(S): 5 TABLET ORAL at 02:31

## 2019-01-01 RX ADMIN — Medication 650 MILLIGRAM(S): at 06:43

## 2019-01-01 RX ADMIN — Medication 650 MILLIGRAM(S): at 06:23

## 2019-01-01 RX ADMIN — OXYCODONE HYDROCHLORIDE 60 MILLIGRAM(S): 5 TABLET ORAL at 01:32

## 2019-01-01 RX ADMIN — METHADONE HYDROCHLORIDE 80 MILLIGRAM(S): 40 TABLET ORAL at 06:22

## 2019-01-01 RX ADMIN — CHLORHEXIDINE GLUCONATE 1 APPLICATION(S): 213 SOLUTION TOPICAL at 05:44

## 2019-01-01 RX ADMIN — HEPARIN SODIUM 5000 UNIT(S): 5000 INJECTION INTRAVENOUS; SUBCUTANEOUS at 05:44

## 2019-01-01 RX ADMIN — Medication 100 MILLIGRAM(S): at 21:40

## 2019-01-01 RX ADMIN — Medication 1 TABLET(S): at 13:05

## 2019-01-01 RX ADMIN — Medication 1 APPLICATION(S): at 17:51

## 2019-01-01 RX ADMIN — Medication 650 MILLIGRAM(S): at 22:15

## 2019-01-01 RX ADMIN — Medication 1 TABLET(S): at 05:15

## 2019-01-01 RX ADMIN — CEFTRIAXONE 100 MILLIGRAM(S): 500 INJECTION, POWDER, FOR SOLUTION INTRAMUSCULAR; INTRAVENOUS at 17:02

## 2019-01-01 RX ADMIN — Medication 650 MILLIGRAM(S): at 21:09

## 2019-01-01 RX ADMIN — MORPHINE SULFATE 2 MILLIGRAM(S): 50 CAPSULE, EXTENDED RELEASE ORAL at 02:32

## 2019-01-01 RX ADMIN — NYSTATIN CREAM 1 APPLICATION(S): 100000 CREAM TOPICAL at 05:09

## 2019-01-01 RX ADMIN — OXYCODONE HYDROCHLORIDE 30 MILLIGRAM(S): 5 TABLET ORAL at 19:12

## 2019-01-01 RX ADMIN — Medication 100 MILLIGRAM(S): at 21:39

## 2019-01-01 RX ADMIN — OXYCODONE HYDROCHLORIDE 30 MILLIGRAM(S): 5 TABLET ORAL at 06:57

## 2019-01-01 RX ADMIN — Medication 10 MILLIGRAM(S): at 17:35

## 2019-01-01 RX ADMIN — METHADONE HYDROCHLORIDE 80 MILLIGRAM(S): 40 TABLET ORAL at 23:13

## 2019-01-01 RX ADMIN — Medication 100 MICROGRAM(S): at 05:40

## 2019-01-01 RX ADMIN — Medication 10 MILLIGRAM(S): at 05:46

## 2019-01-01 RX ADMIN — PANTOPRAZOLE SODIUM 40 MILLIGRAM(S): 20 TABLET, DELAYED RELEASE ORAL at 08:39

## 2019-01-01 RX ADMIN — Medication 2.5 MILLIGRAM(S): at 17:03

## 2019-01-01 RX ADMIN — CEFTRIAXONE 1000 MILLIGRAM(S): 500 INJECTION, POWDER, FOR SOLUTION INTRAMUSCULAR; INTRAVENOUS at 04:19

## 2019-01-01 RX ADMIN — Medication 1 APPLICATION(S): at 05:07

## 2019-01-01 RX ADMIN — Medication 667 MILLIGRAM(S): at 17:29

## 2019-01-01 RX ADMIN — HEPARIN SODIUM 5000 UNIT(S): 5000 INJECTION INTRAVENOUS; SUBCUTANEOUS at 05:10

## 2019-01-01 RX ADMIN — Medication 667 MILLIGRAM(S): at 17:20

## 2019-01-01 RX ADMIN — OXYCODONE HYDROCHLORIDE 30 MILLIGRAM(S): 5 TABLET ORAL at 12:26

## 2019-01-01 RX ADMIN — Medication 100 MILLIGRAM(S): at 14:23

## 2019-01-01 RX ADMIN — Medication 2.5 MILLIGRAM(S): at 18:26

## 2019-01-01 RX ADMIN — INSULIN HUMAN 10 UNIT(S): 100 INJECTION, SOLUTION SUBCUTANEOUS at 09:29

## 2019-01-01 RX ADMIN — Medication 1 TABLET(S): at 13:25

## 2019-01-01 RX ADMIN — Medication 25 MILLIGRAM(S): at 21:29

## 2019-01-01 RX ADMIN — OXYCODONE HYDROCHLORIDE 50 MILLIGRAM(S): 5 TABLET ORAL at 13:56

## 2019-01-01 RX ADMIN — Medication 667 MILLIGRAM(S): at 11:32

## 2019-01-01 RX ADMIN — MUPIROCIN 1 APPLICATION(S): 20 OINTMENT TOPICAL at 05:34

## 2019-01-01 RX ADMIN — HEPARIN SODIUM 5000 UNIT(S): 5000 INJECTION INTRAVENOUS; SUBCUTANEOUS at 21:17

## 2019-01-01 RX ADMIN — Medication 600 MILLIGRAM(S): at 04:19

## 2019-01-01 RX ADMIN — NYSTATIN CREAM 1 APPLICATION(S): 100000 CREAM TOPICAL at 17:19

## 2019-01-01 RX ADMIN — SODIUM CHLORIDE 75 MILLILITER(S): 9 INJECTION, SOLUTION INTRAVENOUS at 08:56

## 2019-01-01 RX ADMIN — HEPARIN SODIUM 5000 UNIT(S): 5000 INJECTION INTRAVENOUS; SUBCUTANEOUS at 23:30

## 2019-01-01 RX ADMIN — Medication 2 MILLIGRAM(S): at 22:21

## 2019-01-01 RX ADMIN — Medication 250 MILLIGRAM(S): at 23:17

## 2019-01-01 RX ADMIN — METHADONE HYDROCHLORIDE 80 MILLIGRAM(S): 40 TABLET ORAL at 05:24

## 2019-01-01 RX ADMIN — MORPHINE SULFATE 2 MILLIGRAM(S): 50 CAPSULE, EXTENDED RELEASE ORAL at 00:29

## 2019-01-01 RX ADMIN — Medication 1 TABLET(S): at 14:18

## 2019-01-01 RX ADMIN — Medication 100 MILLIGRAM(S): at 15:36

## 2019-01-01 RX ADMIN — METHADONE HYDROCHLORIDE 80 MILLIGRAM(S): 40 TABLET ORAL at 21:59

## 2019-01-01 RX ADMIN — Medication 5 MILLIGRAM(S): at 01:35

## 2019-01-01 RX ADMIN — SODIUM CHLORIDE 75 MILLILITER(S): 9 INJECTION, SOLUTION INTRAVENOUS at 10:04

## 2019-01-01 RX ADMIN — Medication 650 MILLIGRAM(S): at 06:17

## 2019-01-01 RX ADMIN — HEPARIN SODIUM 5000 UNIT(S): 5000 INJECTION INTRAVENOUS; SUBCUTANEOUS at 17:35

## 2019-01-01 RX ADMIN — HEPARIN SODIUM 5000 UNIT(S): 5000 INJECTION INTRAVENOUS; SUBCUTANEOUS at 05:06

## 2019-01-01 RX ADMIN — Medication 500 MILLIGRAM(S): at 11:25

## 2019-01-01 RX ADMIN — Medication 25 MILLIGRAM(S): at 21:55

## 2019-01-01 RX ADMIN — OXYCODONE HYDROCHLORIDE 20 MILLIGRAM(S): 5 TABLET ORAL at 17:20

## 2019-01-01 RX ADMIN — OXYCODONE HYDROCHLORIDE 60 MILLIGRAM(S): 5 TABLET ORAL at 20:10

## 2019-01-01 RX ADMIN — Medication 100 MILLIGRAM(S): at 05:03

## 2019-01-01 RX ADMIN — OXYCODONE AND ACETAMINOPHEN 1 TABLET(S): 5; 325 TABLET ORAL at 12:45

## 2019-01-01 RX ADMIN — OXYCODONE HYDROCHLORIDE 20 MILLIGRAM(S): 5 TABLET ORAL at 09:18

## 2019-01-01 RX ADMIN — Medication 5 MILLIGRAM(S): at 11:32

## 2019-01-01 RX ADMIN — OXYCODONE HYDROCHLORIDE 20 MILLIGRAM(S): 5 TABLET ORAL at 22:12

## 2019-01-01 RX ADMIN — OXYCODONE HYDROCHLORIDE 60 MILLIGRAM(S): 5 TABLET ORAL at 05:12

## 2019-01-01 RX ADMIN — LIOTHYRONINE SODIUM 25 MICROGRAM(S): 25 TABLET ORAL at 06:09

## 2019-01-01 RX ADMIN — PANTOPRAZOLE SODIUM 40 MILLIGRAM(S): 20 TABLET, DELAYED RELEASE ORAL at 05:48

## 2019-01-01 RX ADMIN — METHADONE HYDROCHLORIDE 80 MILLIGRAM(S): 40 TABLET ORAL at 21:12

## 2019-01-01 RX ADMIN — SODIUM CHLORIDE 75 MILLILITER(S): 9 INJECTION, SOLUTION INTRAVENOUS at 23:37

## 2019-01-01 RX ADMIN — OXYCODONE HYDROCHLORIDE 50 MILLIGRAM(S): 5 TABLET ORAL at 01:07

## 2019-01-01 RX ADMIN — METHADONE HYDROCHLORIDE 80 MILLIGRAM(S): 40 TABLET ORAL at 12:35

## 2019-01-01 RX ADMIN — PANTOPRAZOLE SODIUM 40 MILLIGRAM(S): 20 TABLET, DELAYED RELEASE ORAL at 17:09

## 2019-01-01 RX ADMIN — OXYCODONE AND ACETAMINOPHEN 2 TABLET(S): 5; 325 TABLET ORAL at 15:15

## 2019-01-01 RX ADMIN — MORPHINE SULFATE 2 MILLIGRAM(S): 50 CAPSULE, EXTENDED RELEASE ORAL at 04:20

## 2019-01-01 RX ADMIN — MUPIROCIN 1 APPLICATION(S): 20 OINTMENT TOPICAL at 06:26

## 2019-01-01 RX ADMIN — Medication 1 TABLET(S): at 21:44

## 2019-01-01 RX ADMIN — Medication 650 MILLIGRAM(S): at 17:47

## 2019-01-01 RX ADMIN — CEFTRIAXONE 100 MILLIGRAM(S): 500 INJECTION, POWDER, FOR SOLUTION INTRAMUSCULAR; INTRAVENOUS at 17:24

## 2019-01-01 RX ADMIN — METHADONE HYDROCHLORIDE 40 MILLIGRAM(S): 40 TABLET ORAL at 17:18

## 2019-01-01 RX ADMIN — HEPARIN SODIUM 5000 UNIT(S): 5000 INJECTION INTRAVENOUS; SUBCUTANEOUS at 06:19

## 2019-01-01 RX ADMIN — METHADONE HYDROCHLORIDE 80 MILLIGRAM(S): 40 TABLET ORAL at 13:46

## 2019-01-01 RX ADMIN — Medication 100 MILLIGRAM(S): at 12:11

## 2019-01-01 RX ADMIN — METHADONE HYDROCHLORIDE 80 MILLIGRAM(S): 40 TABLET ORAL at 19:00

## 2019-01-01 RX ADMIN — METHADONE HYDROCHLORIDE 80 MILLIGRAM(S): 40 TABLET ORAL at 18:08

## 2019-01-01 RX ADMIN — Medication 250 MILLIGRAM(S): at 11:14

## 2019-01-01 RX ADMIN — PANTOPRAZOLE SODIUM 40 MILLIGRAM(S): 20 TABLET, DELAYED RELEASE ORAL at 06:52

## 2019-01-01 RX ADMIN — CEFTRIAXONE 100 MILLIGRAM(S): 500 INJECTION, POWDER, FOR SOLUTION INTRAMUSCULAR; INTRAVENOUS at 18:37

## 2019-01-01 RX ADMIN — Medication 1 APPLICATION(S): at 17:49

## 2019-01-01 RX ADMIN — Medication 10 MILLIGRAM(S): at 06:42

## 2019-01-01 RX ADMIN — Medication 650 MILLIGRAM(S): at 05:53

## 2019-01-01 RX ADMIN — METHADONE HYDROCHLORIDE 10 MILLIGRAM(S): 40 TABLET ORAL at 11:59

## 2019-01-01 RX ADMIN — CHLORHEXIDINE GLUCONATE 1 APPLICATION(S): 213 SOLUTION TOPICAL at 05:24

## 2019-01-01 RX ADMIN — PANTOPRAZOLE SODIUM 40 MILLIGRAM(S): 20 TABLET, DELAYED RELEASE ORAL at 06:13

## 2019-01-01 RX ADMIN — Medication 650 MILLIGRAM(S): at 07:34

## 2019-01-01 RX ADMIN — OXYCODONE HYDROCHLORIDE 60 MILLIGRAM(S): 5 TABLET ORAL at 01:50

## 2019-01-01 RX ADMIN — OXYCODONE HYDROCHLORIDE 60 MILLIGRAM(S): 5 TABLET ORAL at 13:15

## 2019-01-01 RX ADMIN — Medication 100 GRAM(S): at 05:28

## 2019-01-01 RX ADMIN — Medication 250 MILLIGRAM(S): at 06:36

## 2019-01-01 RX ADMIN — CHLORHEXIDINE GLUCONATE 1 APPLICATION(S): 213 SOLUTION TOPICAL at 05:02

## 2019-01-01 RX ADMIN — PANTOPRAZOLE SODIUM 40 MILLIGRAM(S): 20 TABLET, DELAYED RELEASE ORAL at 15:19

## 2019-01-01 RX ADMIN — HEPARIN SODIUM 5000 UNIT(S): 5000 INJECTION INTRAVENOUS; SUBCUTANEOUS at 05:03

## 2019-01-01 RX ADMIN — CHLORHEXIDINE GLUCONATE 1 APPLICATION(S): 213 SOLUTION TOPICAL at 06:19

## 2019-01-01 RX ADMIN — Medication 2 MILLIGRAM(S): at 21:29

## 2019-01-01 RX ADMIN — Medication 100 MILLIGRAM(S): at 14:05

## 2019-01-01 RX ADMIN — SODIUM CHLORIDE 150 MILLILITER(S): 9 INJECTION, SOLUTION INTRAVENOUS at 02:09

## 2019-01-01 RX ADMIN — SODIUM CHLORIDE 500 MILLILITER(S): 9 INJECTION, SOLUTION INTRAVENOUS at 17:00

## 2019-01-01 RX ADMIN — Medication 100 MILLIGRAM(S): at 05:34

## 2019-01-01 RX ADMIN — Medication 10 MILLIGRAM(S): at 06:22

## 2019-01-01 RX ADMIN — Medication 1 APPLICATION(S): at 14:01

## 2019-01-01 RX ADMIN — Medication 50 MILLILITER(S): at 22:15

## 2019-01-01 RX ADMIN — METHADONE HYDROCHLORIDE 80 MILLIGRAM(S): 40 TABLET ORAL at 11:03

## 2019-01-01 RX ADMIN — ONDANSETRON 4 MILLIGRAM(S): 8 TABLET, FILM COATED ORAL at 06:56

## 2019-01-01 RX ADMIN — Medication 10 MILLIGRAM(S): at 05:57

## 2019-01-01 RX ADMIN — METHADONE HYDROCHLORIDE 80 MILLIGRAM(S): 40 TABLET ORAL at 21:22

## 2019-01-01 RX ADMIN — SODIUM CHLORIDE 1000 MILLILITER(S): 9 INJECTION INTRAMUSCULAR; INTRAVENOUS; SUBCUTANEOUS at 22:30

## 2019-01-01 RX ADMIN — METHADONE HYDROCHLORIDE 80 MILLIGRAM(S): 40 TABLET ORAL at 23:22

## 2019-01-01 RX ADMIN — Medication 650 MILLIGRAM(S): at 14:09

## 2019-01-01 RX ADMIN — Medication 50 MILLIGRAM(S): at 15:14

## 2019-01-01 RX ADMIN — Medication 5 MILLIGRAM(S): at 17:40

## 2019-01-01 RX ADMIN — Medication 100 MILLIGRAM(S): at 13:56

## 2019-01-01 RX ADMIN — METHADONE HYDROCHLORIDE 80 MILLIGRAM(S): 40 TABLET ORAL at 11:19

## 2019-01-01 RX ADMIN — METHADONE HYDROCHLORIDE 80 MILLIGRAM(S): 40 TABLET ORAL at 11:42

## 2019-01-01 RX ADMIN — MORPHINE SULFATE 2 MILLIGRAM(S): 50 CAPSULE, EXTENDED RELEASE ORAL at 20:30

## 2019-01-01 RX ADMIN — Medication 5 MILLIGRAM(S): at 11:58

## 2019-01-01 RX ADMIN — Medication 650 MILLIGRAM(S): at 21:19

## 2019-01-01 RX ADMIN — OXYCODONE HYDROCHLORIDE 20 MILLIGRAM(S): 5 TABLET ORAL at 09:38

## 2019-01-01 RX ADMIN — COSYNTROPIN 0.25 MILLIGRAM(S): 0.25 INJECTION, SOLUTION INTRAVENOUS at 18:00

## 2019-01-01 RX ADMIN — OXYCODONE HYDROCHLORIDE 60 MILLIGRAM(S): 5 TABLET ORAL at 17:30

## 2019-01-01 RX ADMIN — OXYCODONE HYDROCHLORIDE 30 MILLIGRAM(S): 5 TABLET ORAL at 19:33

## 2019-01-01 RX ADMIN — Medication 650 MILLIGRAM(S): at 06:44

## 2019-01-01 RX ADMIN — OXYCODONE HYDROCHLORIDE 60 MILLIGRAM(S): 5 TABLET ORAL at 02:20

## 2019-01-01 RX ADMIN — HEPARIN SODIUM 5000 UNIT(S): 5000 INJECTION INTRAVENOUS; SUBCUTANEOUS at 13:26

## 2019-01-01 RX ADMIN — OXYCODONE HYDROCHLORIDE 20 MILLIGRAM(S): 5 TABLET ORAL at 19:40

## 2019-01-01 RX ADMIN — Medication 667 MILLIGRAM(S): at 11:16

## 2019-01-01 RX ADMIN — Medication 1 TABLET(S): at 13:52

## 2019-01-01 RX ADMIN — HEPARIN SODIUM 5000 UNIT(S): 5000 INJECTION INTRAVENOUS; SUBCUTANEOUS at 13:22

## 2019-01-01 RX ADMIN — PANTOPRAZOLE SODIUM 40 MILLIGRAM(S): 20 TABLET, DELAYED RELEASE ORAL at 17:11

## 2019-01-01 RX ADMIN — Medication 5 MILLIGRAM(S): at 05:40

## 2019-01-01 RX ADMIN — Medication 16.67 GRAM(S): at 12:55

## 2019-01-01 RX ADMIN — METHADONE HYDROCHLORIDE 80 MILLIGRAM(S): 40 TABLET ORAL at 06:15

## 2019-01-01 RX ADMIN — OXYCODONE HYDROCHLORIDE 50 MILLIGRAM(S): 5 TABLET ORAL at 08:44

## 2019-01-01 RX ADMIN — Medication 100 MILLIGRAM(S): at 22:13

## 2019-01-01 RX ADMIN — OXYCODONE HYDROCHLORIDE 50 MILLIGRAM(S): 5 TABLET ORAL at 16:59

## 2019-01-01 RX ADMIN — Medication 2 MILLIGRAM(S): at 21:20

## 2019-01-01 RX ADMIN — Medication 1 TABLET(S): at 11:19

## 2019-01-01 RX ADMIN — MORPHINE SULFATE 2 MILLIGRAM(S): 50 CAPSULE, EXTENDED RELEASE ORAL at 14:09

## 2019-01-01 RX ADMIN — Medication 10 MILLIGRAM(S): at 06:44

## 2019-01-01 RX ADMIN — SEVELAMER CARBONATE 1600 MILLIGRAM(S): 2400 POWDER, FOR SUSPENSION ORAL at 13:26

## 2019-01-01 RX ADMIN — METHADONE HYDROCHLORIDE 80 MILLIGRAM(S): 40 TABLET ORAL at 05:39

## 2019-01-01 RX ADMIN — MORPHINE SULFATE 2 MILLIGRAM(S): 50 CAPSULE, EXTENDED RELEASE ORAL at 17:54

## 2019-01-01 RX ADMIN — Medication 100 MILLIGRAM(S): at 21:55

## 2019-01-01 RX ADMIN — HEPARIN SODIUM 5000 UNIT(S): 5000 INJECTION INTRAVENOUS; SUBCUTANEOUS at 05:33

## 2019-01-01 RX ADMIN — Medication 1 TABLET(S): at 05:45

## 2019-01-01 RX ADMIN — CEFTRIAXONE 100 MILLIGRAM(S): 500 INJECTION, POWDER, FOR SOLUTION INTRAMUSCULAR; INTRAVENOUS at 17:20

## 2019-01-01 RX ADMIN — FLUDROCORTISONE ACETATE 0.1 MILLIGRAM(S): 0.1 TABLET ORAL at 05:05

## 2019-01-01 RX ADMIN — Medication 1 APPLICATION(S): at 17:39

## 2019-01-01 RX ADMIN — Medication 667 MILLIGRAM(S): at 16:49

## 2019-01-01 RX ADMIN — HEPARIN SODIUM 5000 UNIT(S): 5000 INJECTION INTRAVENOUS; SUBCUTANEOUS at 17:04

## 2019-01-01 RX ADMIN — MUPIROCIN 1 APPLICATION(S): 20 OINTMENT TOPICAL at 05:18

## 2019-01-01 RX ADMIN — HEPARIN SODIUM 5000 UNIT(S): 5000 INJECTION INTRAVENOUS; SUBCUTANEOUS at 08:09

## 2019-01-01 RX ADMIN — Medication 2000 UNIT(S): at 11:15

## 2019-01-01 RX ADMIN — SODIUM CHLORIDE 3000 MILLILITER(S): 9 INJECTION INTRAMUSCULAR; INTRAVENOUS; SUBCUTANEOUS at 01:00

## 2019-01-01 RX ADMIN — HEPARIN SODIUM 5000 UNIT(S): 5000 INJECTION INTRAVENOUS; SUBCUTANEOUS at 17:55

## 2019-01-01 RX ADMIN — HEPARIN SODIUM 5000 UNIT(S): 5000 INJECTION INTRAVENOUS; SUBCUTANEOUS at 05:45

## 2019-01-01 RX ADMIN — METHADONE HYDROCHLORIDE 80 MILLIGRAM(S): 40 TABLET ORAL at 13:15

## 2019-01-01 RX ADMIN — Medication 500 MILLIGRAM(S): at 12:15

## 2019-01-01 RX ADMIN — Medication 100 MILLIGRAM(S): at 22:21

## 2019-01-01 RX ADMIN — Medication 650 MILLIGRAM(S): at 13:51

## 2019-01-01 RX ADMIN — Medication 650 MILLIGRAM(S): at 05:16

## 2019-01-01 RX ADMIN — HEPARIN SODIUM 5000 UNIT(S): 5000 INJECTION INTRAVENOUS; SUBCUTANEOUS at 14:18

## 2019-01-01 RX ADMIN — Medication 1000 UNIT(S): at 13:01

## 2019-01-01 RX ADMIN — Medication 5 MILLIGRAM(S): at 11:29

## 2019-01-01 RX ADMIN — Medication 650 MILLIGRAM(S): at 06:37

## 2019-01-01 RX ADMIN — Medication 667 MILLIGRAM(S): at 17:21

## 2019-01-01 RX ADMIN — METHADONE HYDROCHLORIDE 80 MILLIGRAM(S): 40 TABLET ORAL at 17:49

## 2019-01-01 RX ADMIN — Medication 100 GRAM(S): at 06:56

## 2019-01-01 RX ADMIN — Medication 100 MILLIGRAM(S): at 13:47

## 2019-01-01 RX ADMIN — Medication 650 MILLIGRAM(S): at 17:55

## 2019-01-01 RX ADMIN — OXYCODONE HYDROCHLORIDE 50 MILLIGRAM(S): 5 TABLET ORAL at 18:53

## 2019-01-01 RX ADMIN — METHADONE HYDROCHLORIDE 80 MILLIGRAM(S): 40 TABLET ORAL at 17:44

## 2019-01-01 RX ADMIN — Medication 100 MILLIGRAM(S): at 21:46

## 2019-01-01 RX ADMIN — SODIUM CHLORIDE 50 MILLILITER(S): 9 INJECTION, SOLUTION INTRAVENOUS at 23:00

## 2019-01-01 RX ADMIN — Medication 2.5 MILLIGRAM(S): at 06:08

## 2019-01-01 RX ADMIN — MUPIROCIN 1 APPLICATION(S): 20 OINTMENT TOPICAL at 05:41

## 2019-01-01 RX ADMIN — INSULIN HUMAN 10 UNIT(S): 100 INJECTION, SOLUTION SUBCUTANEOUS at 09:13

## 2019-01-01 RX ADMIN — HEPARIN SODIUM 5000 UNIT(S): 5000 INJECTION INTRAVENOUS; SUBCUTANEOUS at 06:01

## 2019-01-01 RX ADMIN — Medication 5 MILLIGRAM(S): at 12:47

## 2019-01-01 RX ADMIN — Medication 25 MILLIGRAM(S): at 21:09

## 2019-01-01 RX ADMIN — Medication 10 MILLIGRAM(S): at 17:16

## 2019-01-01 RX ADMIN — HEPARIN SODIUM 5000 UNIT(S): 5000 INJECTION INTRAVENOUS; SUBCUTANEOUS at 21:42

## 2019-01-01 RX ADMIN — HEPARIN SODIUM 5000 UNIT(S): 5000 INJECTION INTRAVENOUS; SUBCUTANEOUS at 06:23

## 2019-01-01 RX ADMIN — HYDROMORPHONE HYDROCHLORIDE 1 MILLIGRAM(S): 2 INJECTION INTRAMUSCULAR; INTRAVENOUS; SUBCUTANEOUS at 20:46

## 2019-01-01 RX ADMIN — Medication 2.5 MILLIGRAM(S): at 17:24

## 2019-01-01 RX ADMIN — OXYCODONE HYDROCHLORIDE 20 MILLIGRAM(S): 5 TABLET ORAL at 20:59

## 2019-01-01 RX ADMIN — MUPIROCIN 1 APPLICATION(S): 20 OINTMENT TOPICAL at 05:14

## 2019-01-01 RX ADMIN — Medication 100 GRAM(S): at 17:34

## 2019-01-01 RX ADMIN — SODIUM CHLORIDE 500 MILLILITER(S): 9 INJECTION, SOLUTION INTRAVENOUS at 20:22

## 2019-01-01 RX ADMIN — HEPARIN SODIUM 5000 UNIT(S): 5000 INJECTION INTRAVENOUS; SUBCUTANEOUS at 05:46

## 2019-01-01 RX ADMIN — OXYCODONE HYDROCHLORIDE 40 MILLIGRAM(S): 5 TABLET ORAL at 11:15

## 2019-01-01 RX ADMIN — Medication 5 MILLIGRAM(S): at 12:11

## 2019-01-01 RX ADMIN — SODIUM CHLORIDE 75 MILLILITER(S): 9 INJECTION, SOLUTION INTRAVENOUS at 10:09

## 2019-01-01 RX ADMIN — Medication 50 GRAM(S): at 13:26

## 2019-01-01 RX ADMIN — SODIUM CHLORIDE 75 MILLILITER(S): 9 INJECTION, SOLUTION INTRAVENOUS at 19:49

## 2019-01-01 RX ADMIN — Medication 100 MILLIGRAM(S): at 13:27

## 2019-01-01 RX ADMIN — Medication 100 MILLIGRAM(S): at 21:52

## 2019-01-01 RX ADMIN — Medication 5 MILLIGRAM(S): at 11:38

## 2019-01-01 RX ADMIN — Medication 1 TABLET(S): at 06:22

## 2019-01-01 RX ADMIN — CHLORHEXIDINE GLUCONATE 1 APPLICATION(S): 213 SOLUTION TOPICAL at 06:25

## 2019-01-01 RX ADMIN — Medication 100 MICROGRAM(S): at 05:39

## 2019-01-01 RX ADMIN — METHADONE HYDROCHLORIDE 80 MILLIGRAM(S): 40 TABLET ORAL at 11:12

## 2019-01-01 RX ADMIN — Medication 100 MICROGRAM(S): at 05:32

## 2019-01-01 RX ADMIN — SEVELAMER CARBONATE 800 MILLIGRAM(S): 2400 POWDER, FOR SUSPENSION ORAL at 17:03

## 2019-01-01 RX ADMIN — ONDANSETRON 4 MILLIGRAM(S): 8 TABLET, FILM COATED ORAL at 23:04

## 2019-01-01 RX ADMIN — METHADONE HYDROCHLORIDE 80 MILLIGRAM(S): 40 TABLET ORAL at 05:45

## 2019-01-01 RX ADMIN — Medication 2 MILLIGRAM(S): at 00:00

## 2019-01-01 RX ADMIN — Medication 100 MILLIGRAM(S): at 21:13

## 2019-01-01 RX ADMIN — Medication 250 MILLIGRAM(S): at 23:07

## 2019-01-01 RX ADMIN — OXYCODONE HYDROCHLORIDE 60 MILLIGRAM(S): 5 TABLET ORAL at 19:37

## 2019-01-01 RX ADMIN — Medication 3.22 MICROGRAM(S)/KG/MIN: at 07:53

## 2019-01-01 RX ADMIN — SODIUM CHLORIDE 100 MILLILITER(S): 9 INJECTION INTRAMUSCULAR; INTRAVENOUS; SUBCUTANEOUS at 23:02

## 2019-01-01 RX ADMIN — CEFTRIAXONE 100 MILLIGRAM(S): 500 INJECTION, POWDER, FOR SOLUTION INTRAMUSCULAR; INTRAVENOUS at 00:09

## 2019-01-01 RX ADMIN — SODIUM CHLORIDE 100 MILLILITER(S): 9 INJECTION INTRAMUSCULAR; INTRAVENOUS; SUBCUTANEOUS at 09:42

## 2019-01-01 RX ADMIN — OXYCODONE HYDROCHLORIDE 60 MILLIGRAM(S): 5 TABLET ORAL at 12:33

## 2019-01-01 RX ADMIN — Medication 1 TABLET(S): at 06:27

## 2019-01-01 RX ADMIN — Medication 667 MILLIGRAM(S): at 18:00

## 2019-01-01 RX ADMIN — METHADONE HYDROCHLORIDE 10 MILLIGRAM(S): 40 TABLET ORAL at 11:38

## 2019-01-01 RX ADMIN — HEPARIN SODIUM 5000 UNIT(S): 5000 INJECTION INTRAVENOUS; SUBCUTANEOUS at 06:08

## 2019-01-01 RX ADMIN — CHLORHEXIDINE GLUCONATE 1 APPLICATION(S): 213 SOLUTION TOPICAL at 05:25

## 2019-01-01 RX ADMIN — HEPARIN SODIUM 5000 UNIT(S): 5000 INJECTION INTRAVENOUS; SUBCUTANEOUS at 22:02

## 2019-01-01 RX ADMIN — Medication 1 TABLET(S): at 13:26

## 2019-01-01 RX ADMIN — HEPARIN SODIUM 5000 UNIT(S): 5000 INJECTION INTRAVENOUS; SUBCUTANEOUS at 06:55

## 2019-01-01 RX ADMIN — Medication 100 MILLIGRAM(S): at 13:38

## 2019-01-01 RX ADMIN — Medication 1 TABLET(S): at 05:00

## 2019-01-01 RX ADMIN — SEVELAMER CARBONATE 800 MILLIGRAM(S): 2400 POWDER, FOR SUSPENSION ORAL at 17:06

## 2019-01-01 RX ADMIN — PANTOPRAZOLE SODIUM 40 MILLIGRAM(S): 20 TABLET, DELAYED RELEASE ORAL at 17:43

## 2019-01-01 RX ADMIN — Medication 1 APPLICATION(S): at 17:00

## 2019-01-01 RX ADMIN — Medication 1 APPLICATION(S): at 11:03

## 2019-01-01 RX ADMIN — Medication 10 MILLIGRAM(S): at 17:20

## 2019-01-01 RX ADMIN — MORPHINE SULFATE 2 MILLIGRAM(S): 50 CAPSULE, EXTENDED RELEASE ORAL at 00:03

## 2019-01-01 RX ADMIN — Medication 100 MILLIGRAM(S): at 05:23

## 2019-01-01 RX ADMIN — Medication 0.5 MILLIGRAM(S): at 05:17

## 2019-01-01 RX ADMIN — Medication 2.5 MILLIGRAM(S): at 12:42

## 2019-01-01 RX ADMIN — Medication 100 MILLIGRAM(S): at 15:50

## 2019-01-01 RX ADMIN — GABAPENTIN 200 MILLIGRAM(S): 400 CAPSULE ORAL at 11:38

## 2019-01-01 RX ADMIN — OXYCODONE HYDROCHLORIDE 20 MILLIGRAM(S): 5 TABLET ORAL at 15:22

## 2019-01-01 RX ADMIN — Medication 10 MILLIGRAM(S): at 18:16

## 2019-01-01 RX ADMIN — MORPHINE SULFATE 2 MILLIGRAM(S): 50 CAPSULE, EXTENDED RELEASE ORAL at 12:49

## 2019-01-01 RX ADMIN — OXYCODONE HYDROCHLORIDE 50 MILLIGRAM(S): 5 TABLET ORAL at 00:17

## 2019-01-01 RX ADMIN — Medication 5 MILLIGRAM(S): at 18:11

## 2019-01-01 RX ADMIN — OXYCODONE HYDROCHLORIDE 50 MILLIGRAM(S): 5 TABLET ORAL at 07:19

## 2019-01-01 RX ADMIN — Medication 650 MILLIGRAM(S): at 22:09

## 2019-01-01 RX ADMIN — MORPHINE SULFATE 2 MILLIGRAM(S): 50 CAPSULE, EXTENDED RELEASE ORAL at 00:37

## 2019-01-01 RX ADMIN — CHLORHEXIDINE GLUCONATE 1 APPLICATION(S): 213 SOLUTION TOPICAL at 05:12

## 2019-01-01 RX ADMIN — SODIUM CHLORIDE 1000 MILLILITER(S): 9 INJECTION INTRAMUSCULAR; INTRAVENOUS; SUBCUTANEOUS at 20:27

## 2019-01-01 RX ADMIN — METHADONE HYDROCHLORIDE 80 MILLIGRAM(S): 40 TABLET ORAL at 17:33

## 2019-01-01 RX ADMIN — OXYCODONE HYDROCHLORIDE 20 MILLIGRAM(S): 5 TABLET ORAL at 12:01

## 2019-01-01 RX ADMIN — Medication 5 MILLIGRAM(S): at 05:15

## 2019-01-01 RX ADMIN — METHADONE HYDROCHLORIDE 10 MILLIGRAM(S): 40 TABLET ORAL at 12:02

## 2019-01-01 RX ADMIN — OXYCODONE HYDROCHLORIDE 60 MILLIGRAM(S): 5 TABLET ORAL at 14:05

## 2019-01-01 RX ADMIN — OXYCODONE HYDROCHLORIDE 50 MILLIGRAM(S): 5 TABLET ORAL at 06:49

## 2019-01-01 RX ADMIN — MORPHINE SULFATE 2 MILLIGRAM(S): 50 CAPSULE, EXTENDED RELEASE ORAL at 12:47

## 2019-01-01 RX ADMIN — ZINC SULFATE TAB 220 MG (50 MG ZINC EQUIVALENT) 220 MILLIGRAM(S): 220 (50 ZN) TAB at 11:14

## 2019-01-01 RX ADMIN — Medication 325 MILLIGRAM(S): at 17:35

## 2019-01-01 RX ADMIN — Medication 650 MILLIGRAM(S): at 02:06

## 2019-01-01 RX ADMIN — OXYCODONE HYDROCHLORIDE 20 MILLIGRAM(S): 5 TABLET ORAL at 21:42

## 2019-01-01 RX ADMIN — METHADONE HYDROCHLORIDE 80 MILLIGRAM(S): 40 TABLET ORAL at 21:34

## 2019-01-01 RX ADMIN — Medication 650 MILLIGRAM(S): at 05:40

## 2019-01-01 RX ADMIN — HYDROMORPHONE HYDROCHLORIDE 0.5 MILLIGRAM(S): 2 INJECTION INTRAMUSCULAR; INTRAVENOUS; SUBCUTANEOUS at 13:50

## 2019-01-01 RX ADMIN — METHADONE HYDROCHLORIDE 80 MILLIGRAM(S): 40 TABLET ORAL at 11:24

## 2019-01-01 RX ADMIN — OXYCODONE HYDROCHLORIDE 20 MILLIGRAM(S): 5 TABLET ORAL at 08:49

## 2019-01-01 RX ADMIN — HEPARIN SODIUM 5000 UNIT(S): 5000 INJECTION INTRAVENOUS; SUBCUTANEOUS at 06:24

## 2019-01-01 RX ADMIN — MORPHINE SULFATE 2 MILLIGRAM(S): 50 CAPSULE, EXTENDED RELEASE ORAL at 04:39

## 2019-01-01 RX ADMIN — METHADONE HYDROCHLORIDE 80 MILLIGRAM(S): 40 TABLET ORAL at 17:47

## 2019-01-01 RX ADMIN — Medication 650 MILLIGRAM(S): at 11:06

## 2019-01-01 RX ADMIN — Medication 0.5 MILLIGRAM(S): at 04:10

## 2019-01-01 RX ADMIN — Medication 10 MILLIGRAM(S): at 06:14

## 2019-01-01 RX ADMIN — Medication 650 MILLIGRAM(S): at 14:04

## 2019-01-01 RX ADMIN — CEFTRIAXONE 100 MILLIGRAM(S): 500 INJECTION, POWDER, FOR SOLUTION INTRAMUSCULAR; INTRAVENOUS at 05:16

## 2019-01-01 RX ADMIN — HEPARIN SODIUM 5000 UNIT(S): 5000 INJECTION INTRAVENOUS; SUBCUTANEOUS at 13:33

## 2019-01-01 RX ADMIN — CEFTRIAXONE 100 MILLIGRAM(S): 500 INJECTION, POWDER, FOR SOLUTION INTRAMUSCULAR; INTRAVENOUS at 17:29

## 2019-01-01 RX ADMIN — Medication 5 MILLIGRAM(S): at 08:58

## 2019-01-01 RX ADMIN — Medication 25 MILLIGRAM(S): at 23:41

## 2019-01-01 RX ADMIN — OXYCODONE HYDROCHLORIDE 20 MILLIGRAM(S): 5 TABLET ORAL at 19:34

## 2019-01-01 RX ADMIN — Medication 25 MILLIGRAM(S): at 22:24

## 2019-01-01 RX ADMIN — Medication 650 MILLIGRAM(S): at 05:07

## 2019-01-01 RX ADMIN — HEPARIN SODIUM 5000 UNIT(S): 5000 INJECTION INTRAVENOUS; SUBCUTANEOUS at 05:39

## 2019-01-01 RX ADMIN — Medication 650 MILLIGRAM(S): at 11:26

## 2019-01-01 RX ADMIN — METHADONE HYDROCHLORIDE 40 MILLIGRAM(S): 40 TABLET ORAL at 15:30

## 2019-01-01 RX ADMIN — METHADONE HYDROCHLORIDE 80 MILLIGRAM(S): 40 TABLET ORAL at 17:12

## 2019-01-01 RX ADMIN — METHADONE HYDROCHLORIDE 10 MILLIGRAM(S): 40 TABLET ORAL at 11:56

## 2019-01-01 RX ADMIN — Medication 100 MILLIGRAM(S): at 05:38

## 2019-01-01 RX ADMIN — Medication 100 MILLIGRAM(S): at 05:20

## 2019-01-01 RX ADMIN — Medication 0.5 MILLIGRAM(S): at 06:01

## 2019-01-01 RX ADMIN — Medication 500 MILLIGRAM(S): at 11:35

## 2019-01-01 RX ADMIN — METHADONE HYDROCHLORIDE 10 MILLIGRAM(S): 40 TABLET ORAL at 11:32

## 2019-01-01 RX ADMIN — HEPARIN SODIUM 5000 UNIT(S): 5000 INJECTION INTRAVENOUS; SUBCUTANEOUS at 05:20

## 2019-01-01 RX ADMIN — HEPARIN SODIUM 5000 UNIT(S): 5000 INJECTION INTRAVENOUS; SUBCUTANEOUS at 22:19

## 2019-01-01 RX ADMIN — Medication 1 TABLET(S): at 06:10

## 2019-01-01 RX ADMIN — OXYCODONE HYDROCHLORIDE 60 MILLIGRAM(S): 5 TABLET ORAL at 10:15

## 2019-01-01 RX ADMIN — Medication 650 MILLIGRAM(S): at 06:22

## 2019-01-01 RX ADMIN — Medication 2000 UNIT(S): at 12:27

## 2019-01-01 RX ADMIN — Medication 667 MILLIGRAM(S): at 12:27

## 2019-01-01 RX ADMIN — OXYCODONE AND ACETAMINOPHEN 1 TABLET(S): 5; 325 TABLET ORAL at 00:58

## 2019-01-01 RX ADMIN — Medication 650 MILLIGRAM(S): at 05:44

## 2019-01-01 RX ADMIN — Medication 100 MILLIGRAM(S): at 21:44

## 2019-01-01 RX ADMIN — METHADONE HYDROCHLORIDE 10 MILLIGRAM(S): 40 TABLET ORAL at 11:47

## 2019-01-01 RX ADMIN — Medication 100 MICROGRAM(S): at 05:05

## 2019-01-01 RX ADMIN — Medication 650 MILLIGRAM(S): at 18:53

## 2019-01-01 RX ADMIN — Medication 4000 UNIT(S): at 11:21

## 2019-01-01 RX ADMIN — Medication 4000 UNIT(S): at 12:36

## 2019-01-01 RX ADMIN — Medication 650 MILLIGRAM(S): at 14:32

## 2019-01-01 RX ADMIN — OXYCODONE HYDROCHLORIDE 20 MILLIGRAM(S): 5 TABLET ORAL at 06:48

## 2019-01-01 RX ADMIN — Medication 1 TABLET(S): at 22:00

## 2019-01-01 RX ADMIN — SODIUM CHLORIDE 125 MILLILITER(S): 9 INJECTION, SOLUTION INTRAVENOUS at 12:37

## 2019-01-01 RX ADMIN — MORPHINE SULFATE 2 MILLIGRAM(S): 50 CAPSULE, EXTENDED RELEASE ORAL at 00:08

## 2019-01-01 RX ADMIN — Medication 1 APPLICATION(S): at 07:17

## 2019-01-01 RX ADMIN — METHADONE HYDROCHLORIDE 80 MILLIGRAM(S): 40 TABLET ORAL at 12:41

## 2019-01-01 RX ADMIN — HEPARIN SODIUM 5000 UNIT(S): 5000 INJECTION INTRAVENOUS; SUBCUTANEOUS at 21:34

## 2019-01-01 RX ADMIN — FLUDROCORTISONE ACETATE 0.1 MILLIGRAM(S): 0.1 TABLET ORAL at 09:26

## 2019-01-01 RX ADMIN — Medication 1 TABLET(S): at 14:30

## 2019-01-01 RX ADMIN — OXYCODONE HYDROCHLORIDE 30 MILLIGRAM(S): 5 TABLET ORAL at 11:56

## 2019-01-01 RX ADMIN — Medication 2.5 MILLIGRAM(S): at 06:12

## 2019-01-01 RX ADMIN — HEPARIN SODIUM 5000 UNIT(S): 5000 INJECTION INTRAVENOUS; SUBCUTANEOUS at 06:07

## 2019-01-01 RX ADMIN — Medication 1 TABLET(S): at 21:33

## 2019-01-01 RX ADMIN — Medication 2.5 MILLIGRAM(S): at 05:52

## 2019-01-01 RX ADMIN — OXYCODONE HYDROCHLORIDE 40 MILLIGRAM(S): 5 TABLET ORAL at 01:34

## 2019-01-01 RX ADMIN — PANTOPRAZOLE SODIUM 40 MILLIGRAM(S): 20 TABLET, DELAYED RELEASE ORAL at 17:47

## 2019-01-01 RX ADMIN — HEPARIN SODIUM 5000 UNIT(S): 5000 INJECTION INTRAVENOUS; SUBCUTANEOUS at 05:25

## 2019-01-01 RX ADMIN — Medication 1 APPLICATION(S): at 17:28

## 2019-01-01 RX ADMIN — SODIUM CHLORIDE 75 MILLILITER(S): 9 INJECTION, SOLUTION INTRAVENOUS at 18:13

## 2019-01-01 RX ADMIN — SODIUM CHLORIDE 100 MILLILITER(S): 9 INJECTION INTRAMUSCULAR; INTRAVENOUS; SUBCUTANEOUS at 12:45

## 2019-01-01 RX ADMIN — Medication 4000 UNIT(S): at 11:27

## 2019-01-01 RX ADMIN — Medication 20 MILLIGRAM(S): at 06:16

## 2019-01-01 RX ADMIN — SODIUM CHLORIDE 100 MILLILITER(S): 9 INJECTION INTRAMUSCULAR; INTRAVENOUS; SUBCUTANEOUS at 00:00

## 2019-01-01 RX ADMIN — OXYCODONE HYDROCHLORIDE 50 MILLIGRAM(S): 5 TABLET ORAL at 18:23

## 2019-01-01 RX ADMIN — Medication 325 MILLIGRAM(S): at 18:26

## 2019-01-01 RX ADMIN — Medication 650 MILLIGRAM(S): at 05:20

## 2019-01-01 RX ADMIN — HEPARIN SODIUM 5000 UNIT(S): 5000 INJECTION INTRAVENOUS; SUBCUTANEOUS at 13:05

## 2019-01-01 RX ADMIN — Medication 1 APPLICATION(S): at 05:20

## 2019-01-01 RX ADMIN — HEPARIN SODIUM 5000 UNIT(S): 5000 INJECTION INTRAVENOUS; SUBCUTANEOUS at 05:38

## 2019-01-01 RX ADMIN — Medication 100 MICROGRAM(S): at 05:28

## 2019-01-01 RX ADMIN — MUPIROCIN 1 APPLICATION(S): 20 OINTMENT TOPICAL at 17:08

## 2019-01-01 RX ADMIN — SODIUM CHLORIDE 150 MILLILITER(S): 9 INJECTION, SOLUTION INTRAVENOUS at 02:41

## 2019-01-01 RX ADMIN — Medication 2.5 MILLIGRAM(S): at 17:50

## 2019-01-01 RX ADMIN — METHADONE HYDROCHLORIDE 80 MILLIGRAM(S): 40 TABLET ORAL at 00:27

## 2019-01-01 RX ADMIN — MORPHINE SULFATE 2 MILLIGRAM(S): 50 CAPSULE, EXTENDED RELEASE ORAL at 13:44

## 2019-01-01 RX ADMIN — Medication 50 MILLILITER(S): at 09:14

## 2019-01-01 RX ADMIN — MORPHINE SULFATE 2 MILLIGRAM(S): 50 CAPSULE, EXTENDED RELEASE ORAL at 13:11

## 2019-01-01 RX ADMIN — HEPARIN SODIUM 5000 UNIT(S): 5000 INJECTION INTRAVENOUS; SUBCUTANEOUS at 21:20

## 2019-01-01 RX ADMIN — CHLORHEXIDINE GLUCONATE 1 APPLICATION(S): 213 SOLUTION TOPICAL at 06:56

## 2019-01-01 RX ADMIN — DEXMEDETOMIDINE HYDROCHLORIDE IN 0.9% SODIUM CHLORIDE 3.43 MICROGRAM(S)/KG/HR: 4 INJECTION INTRAVENOUS at 19:58

## 2019-01-01 RX ADMIN — OXYCODONE HYDROCHLORIDE 30 MILLIGRAM(S): 5 TABLET ORAL at 10:56

## 2019-01-01 RX ADMIN — MORPHINE SULFATE 2 MILLIGRAM(S): 50 CAPSULE, EXTENDED RELEASE ORAL at 00:12

## 2019-01-01 RX ADMIN — CHLORHEXIDINE GLUCONATE 1 APPLICATION(S): 213 SOLUTION TOPICAL at 09:46

## 2019-01-01 RX ADMIN — OXYCODONE HYDROCHLORIDE 60 MILLIGRAM(S): 5 TABLET ORAL at 19:12

## 2019-01-01 RX ADMIN — Medication 650 MILLIGRAM(S): at 21:41

## 2019-01-01 RX ADMIN — CHLORHEXIDINE GLUCONATE 1 APPLICATION(S): 213 SOLUTION TOPICAL at 05:21

## 2019-01-01 RX ADMIN — METHADONE HYDROCHLORIDE 40 MILLIGRAM(S): 40 TABLET ORAL at 05:08

## 2019-01-01 RX ADMIN — Medication 5 MILLIGRAM(S): at 01:12

## 2019-01-01 RX ADMIN — HEPARIN SODIUM 5000 UNIT(S): 5000 INJECTION INTRAVENOUS; SUBCUTANEOUS at 05:55

## 2019-01-01 RX ADMIN — Medication 0.5 MILLIGRAM(S): at 09:45

## 2019-01-01 RX ADMIN — MUPIROCIN 1 APPLICATION(S): 20 OINTMENT TOPICAL at 05:10

## 2019-01-01 RX ADMIN — Medication 2.5 MILLIGRAM(S): at 05:24

## 2019-01-01 RX ADMIN — HEPARIN SODIUM 5000 UNIT(S): 5000 INJECTION INTRAVENOUS; SUBCUTANEOUS at 13:44

## 2019-01-01 RX ADMIN — OXYCODONE HYDROCHLORIDE 50 MILLIGRAM(S): 5 TABLET ORAL at 07:00

## 2019-01-01 RX ADMIN — COSYNTROPIN 0.25 MILLIGRAM(S): 0.25 INJECTION, SOLUTION INTRAVENOUS at 06:35

## 2019-01-01 RX ADMIN — SODIUM CHLORIDE 125 MILLILITER(S): 9 INJECTION INTRAMUSCULAR; INTRAVENOUS; SUBCUTANEOUS at 20:30

## 2019-01-01 RX ADMIN — SODIUM CHLORIDE 75 MILLILITER(S): 9 INJECTION, SOLUTION INTRAVENOUS at 23:50

## 2019-01-01 RX ADMIN — Medication 650 MILLIGRAM(S): at 00:34

## 2019-01-01 RX ADMIN — Medication 1 TABLET(S): at 21:06

## 2019-01-01 RX ADMIN — OXYCODONE HYDROCHLORIDE 20 MILLIGRAM(S): 5 TABLET ORAL at 03:04

## 2019-01-01 RX ADMIN — OXYCODONE HYDROCHLORIDE 30 MILLIGRAM(S): 5 TABLET ORAL at 11:33

## 2019-01-01 RX ADMIN — PANTOPRAZOLE SODIUM 40 MILLIGRAM(S): 20 TABLET, DELAYED RELEASE ORAL at 05:21

## 2019-01-01 RX ADMIN — Medication 5 MILLIGRAM(S): at 21:17

## 2019-01-01 RX ADMIN — HEPARIN SODIUM 5000 UNIT(S): 5000 INJECTION INTRAVENOUS; SUBCUTANEOUS at 06:33

## 2019-01-01 RX ADMIN — HEPARIN SODIUM 5000 UNIT(S): 5000 INJECTION INTRAVENOUS; SUBCUTANEOUS at 13:15

## 2019-01-01 RX ADMIN — OXYCODONE HYDROCHLORIDE 20 MILLIGRAM(S): 5 TABLET ORAL at 05:00

## 2019-01-01 RX ADMIN — METHADONE HYDROCHLORIDE 80 MILLIGRAM(S): 40 TABLET ORAL at 22:04

## 2019-01-01 RX ADMIN — PANTOPRAZOLE SODIUM 40 MILLIGRAM(S): 20 TABLET, DELAYED RELEASE ORAL at 05:41

## 2019-01-01 RX ADMIN — Medication 5 MILLIGRAM(S): at 11:13

## 2019-01-01 RX ADMIN — DEXMEDETOMIDINE HYDROCHLORIDE IN 0.9% SODIUM CHLORIDE 3.43 MICROGRAM(S)/KG/HR: 4 INJECTION INTRAVENOUS at 22:05

## 2019-01-01 RX ADMIN — METHADONE HYDROCHLORIDE 80 MILLIGRAM(S): 40 TABLET ORAL at 17:06

## 2019-01-01 RX ADMIN — OXYCODONE HYDROCHLORIDE 20 MILLIGRAM(S): 5 TABLET ORAL at 22:39

## 2019-01-01 RX ADMIN — SODIUM CHLORIDE 50 MILLILITER(S): 9 INJECTION, SOLUTION INTRAVENOUS at 03:01

## 2019-01-01 RX ADMIN — OXYCODONE HYDROCHLORIDE 50 MILLIGRAM(S): 5 TABLET ORAL at 12:17

## 2019-01-01 RX ADMIN — Medication 650 MILLIGRAM(S): at 05:27

## 2019-01-01 RX ADMIN — METHADONE HYDROCHLORIDE 80 MILLIGRAM(S): 40 TABLET ORAL at 05:54

## 2019-01-01 RX ADMIN — SODIUM CHLORIDE 500 MILLILITER(S): 9 INJECTION INTRAMUSCULAR; INTRAVENOUS; SUBCUTANEOUS at 08:00

## 2019-01-01 RX ADMIN — METHADONE HYDROCHLORIDE 80 MILLIGRAM(S): 40 TABLET ORAL at 14:16

## 2019-01-01 RX ADMIN — Medication 1 APPLICATION(S): at 13:13

## 2019-01-01 RX ADMIN — METHADONE HYDROCHLORIDE 80 MILLIGRAM(S): 40 TABLET ORAL at 05:56

## 2019-01-01 RX ADMIN — Medication 100 MILLIGRAM(S): at 05:15

## 2019-01-01 RX ADMIN — SEVELAMER CARBONATE 1600 MILLIGRAM(S): 2400 POWDER, FOR SUSPENSION ORAL at 08:10

## 2019-01-01 RX ADMIN — PANTOPRAZOLE SODIUM 40 MILLIGRAM(S): 20 TABLET, DELAYED RELEASE ORAL at 17:34

## 2019-01-01 RX ADMIN — OXYCODONE HYDROCHLORIDE 20 MILLIGRAM(S): 5 TABLET ORAL at 04:09

## 2019-01-01 RX ADMIN — Medication 650 MILLIGRAM(S): at 21:18

## 2019-01-01 RX ADMIN — PANTOPRAZOLE SODIUM 40 MILLIGRAM(S): 20 TABLET, DELAYED RELEASE ORAL at 05:43

## 2019-01-01 RX ADMIN — OXYCODONE HYDROCHLORIDE 30 MILLIGRAM(S): 5 TABLET ORAL at 15:15

## 2019-01-01 RX ADMIN — Medication 5 MILLIGRAM(S): at 17:30

## 2019-01-01 RX ADMIN — OXYCODONE HYDROCHLORIDE 30 MILLIGRAM(S): 5 TABLET ORAL at 05:10

## 2019-01-01 RX ADMIN — Medication 10 MILLIGRAM(S): at 18:11

## 2019-01-01 RX ADMIN — Medication 5 MILLIGRAM(S): at 18:00

## 2019-01-01 RX ADMIN — METHADONE HYDROCHLORIDE 80 MILLIGRAM(S): 40 TABLET ORAL at 06:06

## 2019-01-01 RX ADMIN — Medication 100 MILLIGRAM(S): at 06:13

## 2019-01-01 RX ADMIN — Medication 10 MILLIGRAM(S): at 05:35

## 2019-01-01 RX ADMIN — OXYCODONE HYDROCHLORIDE 20 MILLIGRAM(S): 5 TABLET ORAL at 15:52

## 2019-01-01 RX ADMIN — Medication 20 MILLIGRAM(S): at 05:56

## 2019-01-01 RX ADMIN — Medication 5 MILLIGRAM(S): at 01:31

## 2019-01-01 RX ADMIN — Medication 100 MICROGRAM(S): at 05:17

## 2019-01-01 RX ADMIN — MORPHINE SULFATE 2 MILLIGRAM(S): 50 CAPSULE, EXTENDED RELEASE ORAL at 17:06

## 2019-01-01 RX ADMIN — OXYCODONE HYDROCHLORIDE 50 MILLIGRAM(S): 5 TABLET ORAL at 17:54

## 2019-01-01 RX ADMIN — Medication 5 MILLIGRAM(S): at 17:20

## 2019-01-01 RX ADMIN — Medication 5 MILLIGRAM(S): at 21:31

## 2019-01-01 RX ADMIN — Medication 1 TABLET(S): at 12:23

## 2019-01-01 RX ADMIN — Medication 10 MILLIGRAM(S): at 05:31

## 2019-01-01 RX ADMIN — METHADONE HYDROCHLORIDE 40 MILLIGRAM(S): 40 TABLET ORAL at 13:17

## 2019-01-01 RX ADMIN — Medication 500 MILLIGRAM(S): at 11:21

## 2019-01-01 RX ADMIN — Medication 70 MILLIGRAM(S): at 23:51

## 2019-01-01 RX ADMIN — Medication 1 TABLET(S): at 22:45

## 2019-01-01 RX ADMIN — HEPARIN SODIUM 5000 UNIT(S): 5000 INJECTION INTRAVENOUS; SUBCUTANEOUS at 23:41

## 2019-01-01 RX ADMIN — Medication 100 MICROGRAM(S): at 05:56

## 2019-01-01 RX ADMIN — Medication 667 MILLIGRAM(S): at 17:50

## 2019-01-01 RX ADMIN — Medication 1 TABLET(S): at 05:09

## 2019-01-01 RX ADMIN — ONDANSETRON 4 MILLIGRAM(S): 8 TABLET, FILM COATED ORAL at 05:33

## 2019-01-01 RX ADMIN — Medication 10 MILLIGRAM(S): at 17:09

## 2019-01-01 RX ADMIN — Medication 100 MILLIGRAM(S): at 17:10

## 2019-01-01 RX ADMIN — CHLORHEXIDINE GLUCONATE 1 APPLICATION(S): 213 SOLUTION TOPICAL at 05:57

## 2019-01-01 RX ADMIN — Medication 25 GRAM(S): at 10:28

## 2019-01-01 RX ADMIN — HEPARIN SODIUM 5000 UNIT(S): 5000 INJECTION INTRAVENOUS; SUBCUTANEOUS at 11:33

## 2019-01-01 RX ADMIN — Medication 1 TABLET(S): at 21:41

## 2019-01-01 RX ADMIN — Medication 1 TABLET(S): at 13:47

## 2019-01-01 RX ADMIN — OXYCODONE HYDROCHLORIDE 50 MILLIGRAM(S): 5 TABLET ORAL at 19:32

## 2019-01-01 RX ADMIN — FLUDROCORTISONE ACETATE 0.1 MILLIGRAM(S): 0.1 TABLET ORAL at 17:55

## 2019-01-01 RX ADMIN — Medication 650 MILLIGRAM(S): at 21:26

## 2019-01-01 RX ADMIN — Medication 10 MILLIGRAM(S): at 06:27

## 2019-01-01 RX ADMIN — MORPHINE SULFATE 2 MILLIGRAM(S): 50 CAPSULE, EXTENDED RELEASE ORAL at 02:44

## 2019-01-01 RX ADMIN — Medication 100 MICROGRAM(S): at 06:02

## 2019-01-01 RX ADMIN — CEFEPIME 100 MILLIGRAM(S): 1 INJECTION, POWDER, FOR SOLUTION INTRAMUSCULAR; INTRAVENOUS at 10:26

## 2019-01-01 RX ADMIN — GABAPENTIN 200 MILLIGRAM(S): 400 CAPSULE ORAL at 11:29

## 2019-01-01 RX ADMIN — HEPARIN SODIUM 5000 UNIT(S): 5000 INJECTION INTRAVENOUS; SUBCUTANEOUS at 21:24

## 2019-01-01 RX ADMIN — MORPHINE SULFATE 2 MILLIGRAM(S): 50 CAPSULE, EXTENDED RELEASE ORAL at 15:50

## 2019-01-01 RX ADMIN — PANTOPRAZOLE SODIUM 40 MILLIGRAM(S): 20 TABLET, DELAYED RELEASE ORAL at 05:40

## 2019-01-01 RX ADMIN — MORPHINE SULFATE 2 MILLIGRAM(S): 50 CAPSULE, EXTENDED RELEASE ORAL at 06:46

## 2019-01-01 RX ADMIN — CEFTRIAXONE 100 MILLIGRAM(S): 500 INJECTION, POWDER, FOR SOLUTION INTRAMUSCULAR; INTRAVENOUS at 17:55

## 2019-01-01 RX ADMIN — OXYCODONE HYDROCHLORIDE 60 MILLIGRAM(S): 5 TABLET ORAL at 09:41

## 2019-01-01 RX ADMIN — Medication 667 MILLIGRAM(S): at 08:47

## 2019-01-01 RX ADMIN — Medication 1 MILLIGRAM(S): at 12:09

## 2019-01-01 RX ADMIN — Medication 667 MILLIGRAM(S): at 08:31

## 2019-01-01 RX ADMIN — Medication 250 MILLILITER(S): at 14:39

## 2019-01-01 RX ADMIN — Medication 50 MICROGRAM(S): at 05:46

## 2019-01-01 RX ADMIN — HEPARIN SODIUM 5000 UNIT(S): 5000 INJECTION INTRAVENOUS; SUBCUTANEOUS at 15:24

## 2019-01-01 RX ADMIN — Medication 100 MILLIGRAM(S): at 05:45

## 2019-01-01 RX ADMIN — MORPHINE SULFATE 2 MILLIGRAM(S): 50 CAPSULE, EXTENDED RELEASE ORAL at 03:33

## 2019-01-01 RX ADMIN — METHADONE HYDROCHLORIDE 80 MILLIGRAM(S): 40 TABLET ORAL at 11:30

## 2019-01-01 RX ADMIN — CHLORHEXIDINE GLUCONATE 1 APPLICATION(S): 213 SOLUTION TOPICAL at 06:20

## 2019-01-01 RX ADMIN — Medication 100 MILLIGRAM(S): at 05:47

## 2019-01-01 RX ADMIN — Medication 100 MILLIGRAM(S): at 05:04

## 2019-01-01 RX ADMIN — Medication 25 GRAM(S): at 14:06

## 2019-01-01 RX ADMIN — SODIUM CHLORIDE 100 MILLILITER(S): 9 INJECTION INTRAMUSCULAR; INTRAVENOUS; SUBCUTANEOUS at 11:51

## 2019-01-01 RX ADMIN — Medication 1 TABLET(S): at 15:50

## 2019-01-01 RX ADMIN — SODIUM CHLORIDE 150 MILLILITER(S): 9 INJECTION, SOLUTION INTRAVENOUS at 05:30

## 2019-01-01 RX ADMIN — HEPARIN SODIUM 5000 UNIT(S): 5000 INJECTION INTRAVENOUS; SUBCUTANEOUS at 14:27

## 2019-01-01 RX ADMIN — SODIUM CHLORIDE 75 MILLILITER(S): 9 INJECTION, SOLUTION INTRAVENOUS at 17:30

## 2019-01-01 RX ADMIN — METHADONE HYDROCHLORIDE 80 MILLIGRAM(S): 40 TABLET ORAL at 23:14

## 2019-01-01 RX ADMIN — Medication 2.5 MILLIGRAM(S): at 05:08

## 2019-01-01 RX ADMIN — Medication 25 MILLILITER(S): at 13:56

## 2019-01-01 RX ADMIN — Medication 650 MILLIGRAM(S): at 12:27

## 2019-01-01 RX ADMIN — Medication 10 MILLIGRAM(S): at 17:50

## 2019-01-01 RX ADMIN — Medication 50 GRAM(S): at 15:49

## 2019-01-01 RX ADMIN — Medication 25 MILLIGRAM(S): at 21:32

## 2019-01-01 RX ADMIN — Medication 667 MILLIGRAM(S): at 08:16

## 2019-01-01 RX ADMIN — Medication 1000 UNIT(S): at 11:14

## 2019-01-01 RX ADMIN — Medication 50 GRAM(S): at 07:15

## 2019-01-01 RX ADMIN — OXYCODONE HYDROCHLORIDE 40 MILLIGRAM(S): 5 TABLET ORAL at 10:43

## 2019-01-01 RX ADMIN — OXYCODONE HYDROCHLORIDE 30 MILLIGRAM(S): 5 TABLET ORAL at 08:34

## 2019-01-01 RX ADMIN — Medication 50 GRAM(S): at 11:12

## 2019-01-01 RX ADMIN — METHADONE HYDROCHLORIDE 80 MILLIGRAM(S): 40 TABLET ORAL at 12:44

## 2019-01-01 RX ADMIN — SEVELAMER CARBONATE 1600 MILLIGRAM(S): 2400 POWDER, FOR SUSPENSION ORAL at 07:51

## 2019-01-01 RX ADMIN — HEPARIN SODIUM 5000 UNIT(S): 5000 INJECTION INTRAVENOUS; SUBCUTANEOUS at 05:59

## 2019-01-01 RX ADMIN — Medication 100 MILLIGRAM(S): at 22:15

## 2019-01-01 RX ADMIN — METHADONE HYDROCHLORIDE 40 MILLIGRAM(S): 40 TABLET ORAL at 05:26

## 2019-01-01 RX ADMIN — CEFEPIME 100 MILLIGRAM(S): 1 INJECTION, POWDER, FOR SOLUTION INTRAMUSCULAR; INTRAVENOUS at 01:57

## 2019-01-01 RX ADMIN — OXYCODONE HYDROCHLORIDE 60 MILLIGRAM(S): 5 TABLET ORAL at 05:39

## 2019-01-01 RX ADMIN — HYDROMORPHONE HYDROCHLORIDE 0.5 MILLIGRAM(S): 2 INJECTION INTRAMUSCULAR; INTRAVENOUS; SUBCUTANEOUS at 13:20

## 2019-01-01 RX ADMIN — CEFTRIAXONE 100 MILLIGRAM(S): 500 INJECTION, POWDER, FOR SOLUTION INTRAMUSCULAR; INTRAVENOUS at 16:16

## 2019-01-01 RX ADMIN — METHADONE HYDROCHLORIDE 40 MILLIGRAM(S): 40 TABLET ORAL at 21:10

## 2019-01-01 RX ADMIN — Medication 2000 UNIT(S): at 12:12

## 2019-01-01 RX ADMIN — CHLORHEXIDINE GLUCONATE 1 APPLICATION(S): 213 SOLUTION TOPICAL at 05:51

## 2019-01-01 RX ADMIN — Medication 10 MILLIGRAM(S): at 06:02

## 2019-01-01 RX ADMIN — MORPHINE SULFATE 2 MILLIGRAM(S): 50 CAPSULE, EXTENDED RELEASE ORAL at 21:30

## 2019-01-01 RX ADMIN — Medication 2 MILLIGRAM(S): at 21:26

## 2019-01-01 RX ADMIN — SEVELAMER CARBONATE 1600 MILLIGRAM(S): 2400 POWDER, FOR SUSPENSION ORAL at 11:56

## 2019-01-01 RX ADMIN — Medication 100 MILLIGRAM(S): at 22:29

## 2019-01-01 RX ADMIN — HEPARIN SODIUM 5000 UNIT(S): 5000 INJECTION INTRAVENOUS; SUBCUTANEOUS at 06:14

## 2019-01-01 RX ADMIN — Medication 25 MILLIGRAM(S): at 21:21

## 2019-01-01 RX ADMIN — Medication 650 MILLIGRAM(S): at 12:30

## 2019-01-01 RX ADMIN — Medication 650 MILLIGRAM(S): at 11:29

## 2019-01-01 RX ADMIN — Medication 650 MILLIGRAM(S): at 17:11

## 2019-01-01 RX ADMIN — Medication 10 MILLIGRAM(S): at 17:30

## 2019-01-01 RX ADMIN — Medication 10 MILLIGRAM(S): at 06:55

## 2019-01-01 RX ADMIN — Medication 5 MILLIGRAM(S): at 11:17

## 2019-01-01 RX ADMIN — Medication 5 MILLIGRAM(S): at 07:24

## 2019-01-01 RX ADMIN — MORPHINE SULFATE 2 MILLIGRAM(S): 50 CAPSULE, EXTENDED RELEASE ORAL at 05:56

## 2019-01-01 RX ADMIN — OXYCODONE HYDROCHLORIDE 20 MILLIGRAM(S): 5 TABLET ORAL at 23:35

## 2019-01-01 RX ADMIN — GABAPENTIN 200 MILLIGRAM(S): 400 CAPSULE ORAL at 12:36

## 2019-01-01 RX ADMIN — INSULIN HUMAN 10 UNIT(S): 100 INJECTION, SOLUTION SUBCUTANEOUS at 15:40

## 2019-01-01 RX ADMIN — Medication 100 MILLIGRAM(S): at 22:06

## 2019-01-01 RX ADMIN — CHLORHEXIDINE GLUCONATE 15 MILLILITER(S): 213 SOLUTION TOPICAL at 18:08

## 2019-01-01 RX ADMIN — SODIUM CHLORIDE 150 MILLILITER(S): 9 INJECTION, SOLUTION INTRAVENOUS at 08:31

## 2019-01-01 RX ADMIN — METHADONE HYDROCHLORIDE 80 MILLIGRAM(S): 40 TABLET ORAL at 23:50

## 2019-01-01 RX ADMIN — Medication 650 MILLIGRAM(S): at 01:36

## 2019-01-01 RX ADMIN — Medication 650 MILLIGRAM(S): at 01:00

## 2019-01-01 RX ADMIN — Medication 100 MILLIGRAM(S): at 17:54

## 2019-01-01 RX ADMIN — MORPHINE SULFATE 2 MILLIGRAM(S): 50 CAPSULE, EXTENDED RELEASE ORAL at 10:54

## 2019-01-01 RX ADMIN — SEVELAMER CARBONATE 1600 MILLIGRAM(S): 2400 POWDER, FOR SUSPENSION ORAL at 12:02

## 2019-01-01 RX ADMIN — Medication 1 MILLIGRAM(S): at 12:26

## 2019-01-01 RX ADMIN — Medication 20 MILLIGRAM(S): at 06:17

## 2019-01-01 RX ADMIN — Medication 1 MILLIGRAM(S): at 11:35

## 2019-01-01 RX ADMIN — OXYCODONE HYDROCHLORIDE 60 MILLIGRAM(S): 5 TABLET ORAL at 07:38

## 2019-01-01 RX ADMIN — Medication 1 MILLIGRAM(S): at 11:27

## 2019-01-01 RX ADMIN — Medication 2.5 MILLIGRAM(S): at 17:19

## 2019-01-01 RX ADMIN — OXYCODONE HYDROCHLORIDE 60 MILLIGRAM(S): 5 TABLET ORAL at 23:28

## 2019-01-01 RX ADMIN — Medication 1 TABLET(S): at 14:40

## 2019-01-01 RX ADMIN — Medication 2 MILLIGRAM(S): at 23:02

## 2019-01-01 RX ADMIN — Medication 10 MILLIGRAM(S): at 17:34

## 2019-01-01 RX ADMIN — SEVELAMER CARBONATE 800 MILLIGRAM(S): 2400 POWDER, FOR SUSPENSION ORAL at 08:04

## 2019-01-01 RX ADMIN — SEVELAMER CARBONATE 800 MILLIGRAM(S): 2400 POWDER, FOR SUSPENSION ORAL at 17:34

## 2019-01-01 RX ADMIN — CEFTRIAXONE 100 GRAM(S): 500 INJECTION, POWDER, FOR SOLUTION INTRAMUSCULAR; INTRAVENOUS at 18:16

## 2019-01-01 RX ADMIN — Medication 100 MILLIGRAM(S): at 17:03

## 2019-01-01 RX ADMIN — Medication 10 MILLIGRAM(S): at 17:26

## 2019-01-01 RX ADMIN — CEFTRIAXONE 100 MILLIGRAM(S): 500 INJECTION, POWDER, FOR SOLUTION INTRAMUSCULAR; INTRAVENOUS at 17:34

## 2019-01-01 RX ADMIN — PANTOPRAZOLE SODIUM 40 MILLIGRAM(S): 20 TABLET, DELAYED RELEASE ORAL at 07:55

## 2019-01-01 RX ADMIN — FLUDROCORTISONE ACETATE 0.1 MILLIGRAM(S): 0.1 TABLET ORAL at 05:08

## 2019-01-01 RX ADMIN — GABAPENTIN 200 MILLIGRAM(S): 400 CAPSULE ORAL at 11:15

## 2019-01-01 RX ADMIN — SEVELAMER CARBONATE 800 MILLIGRAM(S): 2400 POWDER, FOR SUSPENSION ORAL at 12:30

## 2019-01-01 RX ADMIN — MORPHINE SULFATE 2 MILLIGRAM(S): 50 CAPSULE, EXTENDED RELEASE ORAL at 07:56

## 2019-01-01 RX ADMIN — OXYCODONE HYDROCHLORIDE 60 MILLIGRAM(S): 5 TABLET ORAL at 13:57

## 2019-01-01 RX ADMIN — SODIUM CHLORIDE 75 MILLILITER(S): 9 INJECTION, SOLUTION INTRAVENOUS at 13:51

## 2019-01-01 RX ADMIN — METHADONE HYDROCHLORIDE 80 MILLIGRAM(S): 40 TABLET ORAL at 06:16

## 2019-01-01 RX ADMIN — CEFEPIME 1000 MILLIGRAM(S): 1 INJECTION, POWDER, FOR SOLUTION INTRAMUSCULAR; INTRAVENOUS at 04:19

## 2019-01-01 RX ADMIN — Medication 667 MILLIGRAM(S): at 12:15

## 2019-01-01 RX ADMIN — Medication 100 MILLIGRAM(S): at 17:50

## 2019-01-01 RX ADMIN — Medication 100 MILLIGRAM(S): at 14:38

## 2019-01-01 RX ADMIN — Medication 1 TABLET(S): at 05:50

## 2019-01-01 RX ADMIN — Medication 250 MILLIGRAM(S): at 01:03

## 2019-01-01 RX ADMIN — Medication 5 MILLIGRAM(S): at 13:51

## 2019-01-01 RX ADMIN — Medication 650 MILLIGRAM(S): at 12:54

## 2019-01-01 RX ADMIN — Medication 650 MILLIGRAM(S): at 05:09

## 2019-01-01 RX ADMIN — METHADONE HYDROCHLORIDE 40 MILLIGRAM(S): 40 TABLET ORAL at 05:05

## 2019-01-01 RX ADMIN — MORPHINE SULFATE 2 MILLIGRAM(S): 50 CAPSULE, EXTENDED RELEASE ORAL at 05:42

## 2019-01-01 RX ADMIN — Medication 1 TABLET(S): at 05:56

## 2019-01-01 RX ADMIN — SODIUM CHLORIDE 120 MILLILITER(S): 9 INJECTION, SOLUTION INTRAVENOUS at 14:56

## 2019-01-01 RX ADMIN — Medication 650 MILLIGRAM(S): at 11:56

## 2019-01-01 RX ADMIN — PANTOPRAZOLE SODIUM 40 MILLIGRAM(S): 20 TABLET, DELAYED RELEASE ORAL at 18:34

## 2019-01-01 RX ADMIN — SODIUM CHLORIDE 75 MILLILITER(S): 9 INJECTION, SOLUTION INTRAVENOUS at 15:41

## 2019-01-01 RX ADMIN — Medication 1000 MILLILITER(S): at 06:42

## 2019-01-01 RX ADMIN — METHADONE HYDROCHLORIDE 80 MILLIGRAM(S): 40 TABLET ORAL at 11:14

## 2019-01-01 RX ADMIN — MORPHINE SULFATE 2 MILLIGRAM(S): 50 CAPSULE, EXTENDED RELEASE ORAL at 01:18

## 2019-01-01 RX ADMIN — Medication 0.5 MILLIGRAM(S): at 05:16

## 2019-01-01 RX ADMIN — CEFTRIAXONE 100 MILLIGRAM(S): 500 INJECTION, POWDER, FOR SOLUTION INTRAMUSCULAR; INTRAVENOUS at 17:38

## 2019-01-01 RX ADMIN — HEPARIN SODIUM 5000 UNIT(S): 5000 INJECTION INTRAVENOUS; SUBCUTANEOUS at 17:10

## 2019-01-01 RX ADMIN — HEPARIN SODIUM 5000 UNIT(S): 5000 INJECTION INTRAVENOUS; SUBCUTANEOUS at 05:08

## 2019-01-01 RX ADMIN — Medication 650 MILLIGRAM(S): at 18:04

## 2019-01-01 RX ADMIN — Medication 250 MILLIGRAM(S): at 00:49

## 2019-01-01 RX ADMIN — METHADONE HYDROCHLORIDE 80 MILLIGRAM(S): 40 TABLET ORAL at 06:25

## 2019-01-01 RX ADMIN — Medication 2 MILLIGRAM(S): at 21:41

## 2019-01-01 RX ADMIN — Medication 667 MILLIGRAM(S): at 17:51

## 2019-01-01 RX ADMIN — Medication 650 MILLIGRAM(S): at 05:23

## 2019-01-01 RX ADMIN — Medication 1 MILLIGRAM(S): at 11:42

## 2019-01-01 RX ADMIN — HEPARIN SODIUM 5000 UNIT(S): 5000 INJECTION INTRAVENOUS; SUBCUTANEOUS at 21:05

## 2019-01-01 RX ADMIN — METHADONE HYDROCHLORIDE 40 MILLIGRAM(S): 40 TABLET ORAL at 05:48

## 2019-01-01 RX ADMIN — OXYCODONE HYDROCHLORIDE 60 MILLIGRAM(S): 5 TABLET ORAL at 18:22

## 2019-01-01 RX ADMIN — METHADONE HYDROCHLORIDE 10 MILLIGRAM(S): 40 TABLET ORAL at 11:35

## 2019-01-01 RX ADMIN — CEFTRIAXONE 100 MILLIGRAM(S): 500 INJECTION, POWDER, FOR SOLUTION INTRAMUSCULAR; INTRAVENOUS at 17:33

## 2019-01-01 RX ADMIN — SODIUM CHLORIDE 75 MILLILITER(S): 9 INJECTION, SOLUTION INTRAVENOUS at 21:16

## 2019-01-01 RX ADMIN — HEPARIN SODIUM 5000 UNIT(S): 5000 INJECTION INTRAVENOUS; SUBCUTANEOUS at 21:29

## 2019-01-01 RX ADMIN — OXYCODONE HYDROCHLORIDE 20 MILLIGRAM(S): 5 TABLET ORAL at 03:05

## 2019-01-01 RX ADMIN — Medication 101 MILLIGRAM(S): at 04:41

## 2019-01-01 RX ADMIN — Medication 10 MILLIGRAM(S): at 18:42

## 2019-01-01 RX ADMIN — Medication 100 MILLIGRAM(S): at 14:01

## 2019-01-01 RX ADMIN — Medication 100 MICROGRAM(S): at 05:44

## 2019-01-01 RX ADMIN — INSULIN HUMAN 5 UNIT(S): 100 INJECTION, SOLUTION SUBCUTANEOUS at 20:21

## 2019-01-01 RX ADMIN — HEPARIN SODIUM 5000 UNIT(S): 5000 INJECTION INTRAVENOUS; SUBCUTANEOUS at 06:53

## 2019-01-01 RX ADMIN — Medication 100 MILLIGRAM(S): at 21:26

## 2019-01-01 RX ADMIN — Medication 5 MILLIGRAM(S): at 07:32

## 2019-01-01 RX ADMIN — HEPARIN SODIUM 5000 UNIT(S): 5000 INJECTION INTRAVENOUS; SUBCUTANEOUS at 05:43

## 2019-01-01 RX ADMIN — Medication 100 MILLIGRAM(S): at 21:22

## 2019-01-01 RX ADMIN — SODIUM CHLORIDE 100 MILLILITER(S): 9 INJECTION INTRAMUSCULAR; INTRAVENOUS; SUBCUTANEOUS at 06:28

## 2019-01-01 RX ADMIN — Medication 100 MILLIGRAM(S): at 21:18

## 2019-01-01 RX ADMIN — Medication 5 MILLIGRAM(S): at 15:03

## 2019-01-01 RX ADMIN — METHADONE HYDROCHLORIDE 80 MILLIGRAM(S): 40 TABLET ORAL at 11:56

## 2019-01-01 RX ADMIN — MEROPENEM 100 MILLIGRAM(S): 1 INJECTION INTRAVENOUS at 08:13

## 2019-01-01 RX ADMIN — Medication 5 MILLIGRAM(S): at 15:47

## 2019-01-01 RX ADMIN — SEVELAMER CARBONATE 800 MILLIGRAM(S): 2400 POWDER, FOR SUSPENSION ORAL at 11:31

## 2019-01-01 RX ADMIN — Medication 10 MILLIGRAM(S): at 17:39

## 2019-01-01 RX ADMIN — Medication 667 MILLIGRAM(S): at 17:35

## 2019-01-01 RX ADMIN — Medication 1 MILLIGRAM(S): at 12:36

## 2019-01-01 RX ADMIN — Medication 10 MILLIGRAM(S): at 06:26

## 2019-01-01 RX ADMIN — Medication 1000 UNIT(S): at 11:12

## 2019-01-01 RX ADMIN — METHADONE HYDROCHLORIDE 80 MILLIGRAM(S): 40 TABLET ORAL at 05:33

## 2019-01-01 RX ADMIN — Medication 50 GRAM(S): at 10:17

## 2019-01-01 RX ADMIN — MUPIROCIN 1 APPLICATION(S): 20 OINTMENT TOPICAL at 18:28

## 2019-01-01 RX ADMIN — CHLORHEXIDINE GLUCONATE 1 APPLICATION(S): 213 SOLUTION TOPICAL at 05:41

## 2019-01-01 RX ADMIN — HEPARIN SODIUM 5000 UNIT(S): 5000 INJECTION INTRAVENOUS; SUBCUTANEOUS at 15:04

## 2019-01-01 RX ADMIN — Medication 1 APPLICATION(S): at 14:57

## 2019-01-01 RX ADMIN — Medication 10 MILLIGRAM(S): at 06:50

## 2019-01-01 RX ADMIN — Medication 2.5 MILLIGRAM(S): at 17:40

## 2019-01-01 RX ADMIN — OXYCODONE HYDROCHLORIDE 20 MILLIGRAM(S): 5 TABLET ORAL at 09:52

## 2019-01-01 RX ADMIN — Medication 667 MILLIGRAM(S): at 08:12

## 2019-01-01 RX ADMIN — METHADONE HYDROCHLORIDE 80 MILLIGRAM(S): 40 TABLET ORAL at 00:55

## 2019-01-01 RX ADMIN — Medication 1 TABLET(S): at 21:40

## 2019-01-01 RX ADMIN — Medication 1 APPLICATION(S): at 18:15

## 2019-01-01 RX ADMIN — Medication 10 MILLIGRAM(S): at 05:38

## 2019-01-01 RX ADMIN — Medication 100 MILLIGRAM(S): at 05:05

## 2019-01-01 RX ADMIN — MORPHINE SULFATE 2 MILLIGRAM(S): 50 CAPSULE, EXTENDED RELEASE ORAL at 00:22

## 2019-01-01 RX ADMIN — Medication 0.5 MILLIGRAM(S): at 05:06

## 2019-01-01 RX ADMIN — HEPARIN SODIUM 5000 UNIT(S): 5000 INJECTION INTRAVENOUS; SUBCUTANEOUS at 05:28

## 2019-01-01 RX ADMIN — Medication 5 MILLIGRAM(S): at 17:03

## 2019-01-01 RX ADMIN — HEPARIN SODIUM 5000 UNIT(S): 5000 INJECTION INTRAVENOUS; SUBCUTANEOUS at 22:10

## 2019-01-01 RX ADMIN — Medication 1 APPLICATION(S): at 17:04

## 2019-01-01 RX ADMIN — METHADONE HYDROCHLORIDE 40 MILLIGRAM(S): 40 TABLET ORAL at 05:07

## 2019-01-01 RX ADMIN — OXYCODONE HYDROCHLORIDE 50 MILLIGRAM(S): 5 TABLET ORAL at 00:16

## 2019-01-01 RX ADMIN — CEFTRIAXONE 100 GRAM(S): 500 INJECTION, POWDER, FOR SOLUTION INTRAMUSCULAR; INTRAVENOUS at 16:12

## 2019-01-01 RX ADMIN — Medication 10 MILLIGRAM(S): at 17:10

## 2019-01-01 RX ADMIN — Medication 1 TABLET(S): at 05:24

## 2019-01-01 RX ADMIN — Medication 5 MILLIGRAM(S): at 05:17

## 2019-01-01 RX ADMIN — Medication 5 MILLIGRAM(S): at 05:51

## 2019-01-01 RX ADMIN — Medication 25 GRAM(S): at 16:09

## 2019-01-01 RX ADMIN — METHADONE HYDROCHLORIDE 80 MILLIGRAM(S): 40 TABLET ORAL at 23:09

## 2019-01-01 RX ADMIN — HEPARIN SODIUM 5000 UNIT(S): 5000 INJECTION INTRAVENOUS; SUBCUTANEOUS at 05:36

## 2019-01-01 RX ADMIN — MORPHINE SULFATE 2 MILLIGRAM(S): 50 CAPSULE, EXTENDED RELEASE ORAL at 17:07

## 2019-01-01 RX ADMIN — ZINC SULFATE TAB 220 MG (50 MG ZINC EQUIVALENT) 220 MILLIGRAM(S): 220 (50 ZN) TAB at 11:18

## 2019-01-01 RX ADMIN — METHADONE HYDROCHLORIDE 10 MILLIGRAM(S): 40 TABLET ORAL at 12:45

## 2019-01-01 RX ADMIN — Medication 650 MILLIGRAM(S): at 05:47

## 2019-01-01 RX ADMIN — Medication 650 MILLIGRAM(S): at 02:45

## 2019-01-01 RX ADMIN — Medication 5 MILLIGRAM(S): at 03:52

## 2019-01-01 RX ADMIN — CHLORHEXIDINE GLUCONATE 1 APPLICATION(S): 213 SOLUTION TOPICAL at 05:52

## 2019-01-01 RX ADMIN — Medication 4000 UNIT(S): at 11:34

## 2019-01-01 RX ADMIN — OXYCODONE HYDROCHLORIDE 60 MILLIGRAM(S): 5 TABLET ORAL at 07:00

## 2019-01-01 RX ADMIN — Medication 200 GRAM(S): at 01:04

## 2019-01-01 RX ADMIN — OXYCODONE HYDROCHLORIDE 30 MILLIGRAM(S): 5 TABLET ORAL at 10:02

## 2019-01-01 RX ADMIN — HEPARIN SODIUM 5000 UNIT(S): 5000 INJECTION INTRAVENOUS; SUBCUTANEOUS at 06:03

## 2019-01-01 RX ADMIN — SEVELAMER CARBONATE 800 MILLIGRAM(S): 2400 POWDER, FOR SUSPENSION ORAL at 11:38

## 2019-01-01 RX ADMIN — Medication 1 TABLET(S): at 21:57

## 2019-01-01 RX ADMIN — Medication 5 MILLIGRAM(S): at 03:46

## 2019-01-01 RX ADMIN — Medication 667 MILLIGRAM(S): at 08:17

## 2019-01-01 RX ADMIN — HEPARIN SODIUM 5000 UNIT(S): 5000 INJECTION INTRAVENOUS; SUBCUTANEOUS at 18:03

## 2019-01-01 RX ADMIN — Medication 650 MILLIGRAM(S): at 17:40

## 2019-01-01 RX ADMIN — Medication 650 MILLIGRAM(S): at 11:47

## 2019-01-01 RX ADMIN — Medication 650 MILLIGRAM(S): at 11:55

## 2019-01-01 RX ADMIN — HEPARIN SODIUM 5000 UNIT(S): 5000 INJECTION INTRAVENOUS; SUBCUTANEOUS at 21:31

## 2019-01-01 RX ADMIN — Medication 650 MILLIGRAM(S): at 05:56

## 2019-01-01 RX ADMIN — SEVELAMER CARBONATE 800 MILLIGRAM(S): 2400 POWDER, FOR SUSPENSION ORAL at 12:14

## 2019-01-01 RX ADMIN — MORPHINE SULFATE 2 MILLIGRAM(S): 50 CAPSULE, EXTENDED RELEASE ORAL at 18:08

## 2019-01-01 RX ADMIN — Medication 4000 UNIT(S): at 11:49

## 2019-01-01 RX ADMIN — GABAPENTIN 200 MILLIGRAM(S): 400 CAPSULE ORAL at 11:47

## 2019-01-01 RX ADMIN — OXYCODONE HYDROCHLORIDE 60 MILLIGRAM(S): 5 TABLET ORAL at 10:53

## 2019-01-01 RX ADMIN — Medication 667 MILLIGRAM(S): at 08:49

## 2019-01-01 RX ADMIN — MORPHINE SULFATE 2 MILLIGRAM(S): 50 CAPSULE, EXTENDED RELEASE ORAL at 11:11

## 2019-01-01 RX ADMIN — MORPHINE SULFATE 2 MILLIGRAM(S): 50 CAPSULE, EXTENDED RELEASE ORAL at 03:25

## 2019-01-01 RX ADMIN — MAGNESIUM OXIDE 400 MG ORAL TABLET 400 MILLIGRAM(S): 241.3 TABLET ORAL at 07:22

## 2019-01-01 RX ADMIN — Medication 5 MILLIGRAM(S): at 12:10

## 2019-01-01 RX ADMIN — OXYCODONE AND ACETAMINOPHEN 1 TABLET(S): 5; 325 TABLET ORAL at 12:11

## 2019-01-01 RX ADMIN — OXYCODONE HYDROCHLORIDE 60 MILLIGRAM(S): 5 TABLET ORAL at 01:42

## 2019-01-01 RX ADMIN — MORPHINE SULFATE 2 MILLIGRAM(S): 50 CAPSULE, EXTENDED RELEASE ORAL at 00:30

## 2019-01-01 RX ADMIN — Medication 100 MILLIGRAM(S): at 22:10

## 2019-01-01 RX ADMIN — METHADONE HYDROCHLORIDE 80 MILLIGRAM(S): 40 TABLET ORAL at 13:01

## 2019-01-01 RX ADMIN — CHLORHEXIDINE GLUCONATE 1 APPLICATION(S): 213 SOLUTION TOPICAL at 06:31

## 2019-01-01 RX ADMIN — Medication 200 MEQ/KG/HR: at 04:18

## 2019-01-01 RX ADMIN — METHADONE HYDROCHLORIDE 80 MILLIGRAM(S): 40 TABLET ORAL at 11:13

## 2019-01-01 RX ADMIN — OXYCODONE HYDROCHLORIDE 30 MILLIGRAM(S): 5 TABLET ORAL at 22:49

## 2019-01-01 RX ADMIN — Medication 100 MILLIGRAM(S): at 05:22

## 2019-01-01 RX ADMIN — HEPARIN SODIUM 5000 UNIT(S): 5000 INJECTION INTRAVENOUS; SUBCUTANEOUS at 17:07

## 2019-01-01 RX ADMIN — METHADONE HYDROCHLORIDE 80 MILLIGRAM(S): 40 TABLET ORAL at 14:09

## 2019-01-01 RX ADMIN — Medication 650 MILLIGRAM(S): at 18:23

## 2019-01-01 RX ADMIN — HEPARIN SODIUM 5000 UNIT(S): 5000 INJECTION INTRAVENOUS; SUBCUTANEOUS at 17:44

## 2019-01-01 RX ADMIN — OXYCODONE HYDROCHLORIDE 60 MILLIGRAM(S): 5 TABLET ORAL at 19:36

## 2019-01-01 RX ADMIN — HEPARIN SODIUM 5000 UNIT(S): 5000 INJECTION INTRAVENOUS; SUBCUTANEOUS at 05:27

## 2019-01-01 RX ADMIN — GABAPENTIN 200 MILLIGRAM(S): 400 CAPSULE ORAL at 12:49

## 2019-01-01 RX ADMIN — Medication 1 TABLET(S): at 21:56

## 2019-01-01 RX ADMIN — Medication 650 MILLIGRAM(S): at 00:15

## 2019-01-01 RX ADMIN — Medication 650 MILLIGRAM(S): at 04:35

## 2019-01-01 RX ADMIN — PIPERACILLIN AND TAZOBACTAM 200 GRAM(S): 4; .5 INJECTION, POWDER, LYOPHILIZED, FOR SOLUTION INTRAVENOUS at 22:30

## 2019-01-01 RX ADMIN — Medication 2.5 MILLIGRAM(S): at 12:02

## 2019-01-01 RX ADMIN — Medication 100 MILLIGRAM(S): at 22:27

## 2019-01-01 RX ADMIN — ONDANSETRON 4 MILLIGRAM(S): 8 TABLET, FILM COATED ORAL at 18:30

## 2019-01-01 RX ADMIN — Medication 650 MILLIGRAM(S): at 01:13

## 2019-01-01 RX ADMIN — SEVELAMER CARBONATE 800 MILLIGRAM(S): 2400 POWDER, FOR SUSPENSION ORAL at 07:41

## 2019-01-01 RX ADMIN — NYSTATIN CREAM 1 APPLICATION(S): 100000 CREAM TOPICAL at 05:24

## 2019-01-01 RX ADMIN — Medication 500 MILLIGRAM(S): at 11:42

## 2019-01-01 RX ADMIN — HEPARIN SODIUM 5000 UNIT(S): 5000 INJECTION INTRAVENOUS; SUBCUTANEOUS at 13:11

## 2019-01-01 RX ADMIN — NALOXONE HYDROCHLORIDE 0.04 MILLIGRAM(S): 4 SPRAY NASAL at 20:58

## 2019-01-01 RX ADMIN — Medication 1 TABLET(S): at 11:18

## 2019-01-01 RX ADMIN — Medication 5 MILLIGRAM(S): at 12:02

## 2019-01-01 RX ADMIN — Medication 667 MILLIGRAM(S): at 11:15

## 2019-01-01 RX ADMIN — Medication 650 MILLIGRAM(S): at 17:43

## 2019-01-01 RX ADMIN — MORPHINE SULFATE 2 MILLIGRAM(S): 50 CAPSULE, EXTENDED RELEASE ORAL at 09:40

## 2019-01-01 RX ADMIN — METHADONE HYDROCHLORIDE 80 MILLIGRAM(S): 40 TABLET ORAL at 14:23

## 2019-01-01 RX ADMIN — SODIUM CHLORIDE 75 MILLILITER(S): 9 INJECTION, SOLUTION INTRAVENOUS at 05:41

## 2019-01-01 RX ADMIN — Medication 10 MILLIGRAM(S): at 17:04

## 2019-01-01 RX ADMIN — Medication 5 MILLIGRAM(S): at 05:28

## 2019-01-01 RX ADMIN — Medication 1000 MILLILITER(S): at 09:12

## 2019-01-01 RX ADMIN — Medication 650 MILLIGRAM(S): at 19:27

## 2019-01-01 RX ADMIN — OXYCODONE HYDROCHLORIDE 20 MILLIGRAM(S): 5 TABLET ORAL at 14:40

## 2019-01-01 RX ADMIN — METHADONE HYDROCHLORIDE 80 MILLIGRAM(S): 40 TABLET ORAL at 23:42

## 2019-01-01 RX ADMIN — OXYCODONE HYDROCHLORIDE 30 MILLIGRAM(S): 5 TABLET ORAL at 20:35

## 2019-01-01 RX ADMIN — METHADONE HYDROCHLORIDE 40 MILLIGRAM(S): 40 TABLET ORAL at 21:15

## 2019-01-01 RX ADMIN — SODIUM CHLORIDE 75 MILLILITER(S): 9 INJECTION, SOLUTION INTRAVENOUS at 12:03

## 2019-01-01 RX ADMIN — Medication 650 MILLIGRAM(S): at 13:23

## 2019-01-01 RX ADMIN — Medication 2.5 MILLIGRAM(S): at 17:27

## 2019-01-01 RX ADMIN — Medication 1 TABLET(S): at 15:24

## 2019-01-01 RX ADMIN — METHADONE HYDROCHLORIDE 80 MILLIGRAM(S): 40 TABLET ORAL at 13:56

## 2019-01-01 RX ADMIN — HEPARIN SODIUM 5000 UNIT(S): 5000 INJECTION INTRAVENOUS; SUBCUTANEOUS at 05:15

## 2019-01-01 RX ADMIN — PANTOPRAZOLE SODIUM 40 MILLIGRAM(S): 20 TABLET, DELAYED RELEASE ORAL at 05:09

## 2019-01-01 RX ADMIN — SODIUM CHLORIDE 75 MILLILITER(S): 9 INJECTION, SOLUTION INTRAVENOUS at 22:05

## 2019-01-01 RX ADMIN — Medication 5 MILLIGRAM(S): at 10:45

## 2019-01-01 RX ADMIN — Medication 2.5 MILLIGRAM(S): at 17:20

## 2019-01-01 RX ADMIN — CEFEPIME 100 MILLIGRAM(S): 1 INJECTION, POWDER, FOR SOLUTION INTRAMUSCULAR; INTRAVENOUS at 23:29

## 2019-01-01 RX ADMIN — Medication 2000 UNIT(S): at 11:55

## 2019-01-01 RX ADMIN — HEPARIN SODIUM 5000 UNIT(S): 5000 INJECTION INTRAVENOUS; SUBCUTANEOUS at 17:50

## 2019-01-01 RX ADMIN — Medication 1 TABLET(S): at 13:38

## 2019-01-01 RX ADMIN — OXYCODONE AND ACETAMINOPHEN 1 TABLET(S): 5; 325 TABLET ORAL at 01:31

## 2019-01-01 RX ADMIN — Medication 10 MILLIGRAM(S): at 05:51

## 2019-01-01 RX ADMIN — PANTOPRAZOLE SODIUM 40 MILLIGRAM(S): 20 TABLET, DELAYED RELEASE ORAL at 06:00

## 2019-01-01 RX ADMIN — Medication 6.43 MICROGRAM(S)/KG/MIN: at 22:55

## 2019-01-01 RX ADMIN — METHADONE HYDROCHLORIDE 80 MILLIGRAM(S): 40 TABLET ORAL at 05:38

## 2019-01-01 RX ADMIN — Medication 650 MILLIGRAM(S): at 06:52

## 2019-01-01 RX ADMIN — HEPARIN SODIUM 5000 UNIT(S): 5000 INJECTION INTRAVENOUS; SUBCUTANEOUS at 05:40

## 2019-01-01 RX ADMIN — Medication 650 MILLIGRAM(S): at 06:27

## 2019-01-01 RX ADMIN — CEFTRIAXONE 100 MILLIGRAM(S): 500 INJECTION, POWDER, FOR SOLUTION INTRAMUSCULAR; INTRAVENOUS at 17:01

## 2019-01-01 RX ADMIN — METHADONE HYDROCHLORIDE 80 MILLIGRAM(S): 40 TABLET ORAL at 06:43

## 2019-01-01 RX ADMIN — Medication 5 MILLIGRAM(S): at 21:34

## 2019-01-01 RX ADMIN — Medication 1 MILLIGRAM(S): at 11:34

## 2019-01-01 RX ADMIN — Medication 50 MICROGRAM(S): at 05:15

## 2019-01-01 RX ADMIN — Medication 1000 UNIT(S): at 11:33

## 2019-01-01 RX ADMIN — Medication 50 MILLILITER(S): at 02:43

## 2019-01-01 RX ADMIN — Medication 10 MILLIGRAM(S): at 05:20

## 2019-01-01 RX ADMIN — METHADONE HYDROCHLORIDE 40 MILLIGRAM(S): 40 TABLET ORAL at 05:44

## 2019-01-01 RX ADMIN — Medication 667 MILLIGRAM(S): at 08:21

## 2019-01-01 RX ADMIN — METHADONE HYDROCHLORIDE 80 MILLIGRAM(S): 40 TABLET ORAL at 12:15

## 2019-01-01 RX ADMIN — METHADONE HYDROCHLORIDE 80 MILLIGRAM(S): 40 TABLET ORAL at 17:38

## 2019-01-01 RX ADMIN — Medication 650 MILLIGRAM(S): at 01:31

## 2019-01-01 RX ADMIN — METHADONE HYDROCHLORIDE 80 MILLIGRAM(S): 40 TABLET ORAL at 11:33

## 2019-01-01 RX ADMIN — Medication 5 MILLIGRAM(S): at 12:42

## 2019-01-01 RX ADMIN — PANTOPRAZOLE SODIUM 40 MILLIGRAM(S): 20 TABLET, DELAYED RELEASE ORAL at 08:04

## 2019-01-01 RX ADMIN — METHADONE HYDROCHLORIDE 10 MILLIGRAM(S): 40 TABLET ORAL at 14:27

## 2019-01-01 RX ADMIN — SEVELAMER CARBONATE 1600 MILLIGRAM(S): 2400 POWDER, FOR SUSPENSION ORAL at 12:11

## 2019-01-01 RX ADMIN — Medication 500 MILLILITER(S): at 13:37

## 2019-01-01 RX ADMIN — Medication 5 MILLIGRAM(S): at 15:27

## 2019-01-01 RX ADMIN — HEPARIN SODIUM 5000 UNIT(S): 5000 INJECTION INTRAVENOUS; SUBCUTANEOUS at 14:23

## 2019-01-01 RX ADMIN — Medication 100 GRAM(S): at 12:17

## 2019-01-01 RX ADMIN — Medication 250 MILLIGRAM(S): at 00:40

## 2019-01-01 RX ADMIN — METHADONE HYDROCHLORIDE 40 MILLIGRAM(S): 40 TABLET ORAL at 13:23

## 2019-01-01 RX ADMIN — OXYCODONE HYDROCHLORIDE 60 MILLIGRAM(S): 5 TABLET ORAL at 00:06

## 2019-01-01 RX ADMIN — CHLORHEXIDINE GLUCONATE 1 APPLICATION(S): 213 SOLUTION TOPICAL at 05:53

## 2019-01-01 RX ADMIN — Medication 2 MILLIGRAM(S): at 21:39

## 2019-01-01 RX ADMIN — OXYCODONE HYDROCHLORIDE 20 MILLIGRAM(S): 5 TABLET ORAL at 21:43

## 2019-01-01 RX ADMIN — Medication 1 TABLET(S): at 05:38

## 2019-01-01 RX ADMIN — Medication 10 MILLIGRAM(S): at 17:13

## 2019-01-01 RX ADMIN — ZINC SULFATE TAB 220 MG (50 MG ZINC EQUIVALENT) 220 MILLIGRAM(S): 220 (50 ZN) TAB at 13:26

## 2019-01-01 RX ADMIN — ZINC SULFATE TAB 220 MG (50 MG ZINC EQUIVALENT) 220 MILLIGRAM(S): 220 (50 ZN) TAB at 11:19

## 2019-01-01 RX ADMIN — Medication 500 MILLIGRAM(S): at 11:32

## 2019-01-01 RX ADMIN — Medication 1 TABLET(S): at 22:09

## 2019-01-01 RX ADMIN — SODIUM CHLORIDE 50 MILLILITER(S): 9 INJECTION, SOLUTION INTRAVENOUS at 17:23

## 2019-01-01 RX ADMIN — METHADONE HYDROCHLORIDE 80 MILLIGRAM(S): 40 TABLET ORAL at 17:20

## 2019-01-01 RX ADMIN — OXYCODONE HYDROCHLORIDE 60 MILLIGRAM(S): 5 TABLET ORAL at 19:06

## 2019-01-01 RX ADMIN — Medication 2.5 MILLIGRAM(S): at 18:01

## 2019-01-01 RX ADMIN — HEPARIN SODIUM 5000 UNIT(S): 5000 INJECTION INTRAVENOUS; SUBCUTANEOUS at 17:31

## 2019-01-01 RX ADMIN — Medication 500 MILLIGRAM(S): at 11:48

## 2019-01-01 RX ADMIN — Medication 100 MILLIGRAM(S): at 13:51

## 2019-01-01 RX ADMIN — Medication 250 MILLIGRAM(S): at 11:20

## 2019-01-01 RX ADMIN — METHADONE HYDROCHLORIDE 80 MILLIGRAM(S): 40 TABLET ORAL at 12:27

## 2019-01-01 RX ADMIN — Medication 5 MILLIGRAM(S): at 15:53

## 2019-01-01 RX ADMIN — Medication 1 MILLIGRAM(S): at 11:21

## 2019-01-01 RX ADMIN — AMLODIPINE BESYLATE 5 MILLIGRAM(S): 2.5 TABLET ORAL at 22:41

## 2019-01-01 RX ADMIN — Medication 1 MILLIGRAM(S): at 12:15

## 2019-01-01 RX ADMIN — Medication 100 MICROGRAM(S): at 05:31

## 2019-01-01 RX ADMIN — SODIUM CHLORIDE 125 MILLILITER(S): 9 INJECTION, SOLUTION INTRAVENOUS at 15:13

## 2019-01-01 RX ADMIN — CHLORHEXIDINE GLUCONATE 1 APPLICATION(S): 213 SOLUTION TOPICAL at 06:02

## 2019-01-01 RX ADMIN — HEPARIN SODIUM 5000 UNIT(S): 5000 INJECTION INTRAVENOUS; SUBCUTANEOUS at 21:40

## 2019-01-01 RX ADMIN — Medication 650 MILLIGRAM(S): at 22:00

## 2019-01-01 RX ADMIN — OXYCODONE HYDROCHLORIDE 30 MILLIGRAM(S): 5 TABLET ORAL at 12:12

## 2019-01-01 RX ADMIN — SODIUM CHLORIDE 50 MILLILITER(S): 9 INJECTION, SOLUTION INTRAVENOUS at 12:11

## 2019-01-01 RX ADMIN — HEPARIN SODIUM 5000 UNIT(S): 5000 INJECTION INTRAVENOUS; SUBCUTANEOUS at 21:27

## 2019-01-01 RX ADMIN — CHLORHEXIDINE GLUCONATE 1 APPLICATION(S): 213 SOLUTION TOPICAL at 06:50

## 2019-01-01 RX ADMIN — CHLORHEXIDINE GLUCONATE 1 APPLICATION(S): 213 SOLUTION TOPICAL at 05:39

## 2019-01-01 RX ADMIN — SODIUM CHLORIDE 3000 MILLILITER(S): 9 INJECTION INTRAMUSCULAR; INTRAVENOUS; SUBCUTANEOUS at 20:50

## 2019-01-01 RX ADMIN — METHADONE HYDROCHLORIDE 40 MILLIGRAM(S): 40 TABLET ORAL at 21:09

## 2019-01-01 RX ADMIN — OXYCODONE HYDROCHLORIDE 50 MILLIGRAM(S): 5 TABLET ORAL at 16:37

## 2019-01-01 RX ADMIN — SODIUM CHLORIDE 100 MILLILITER(S): 9 INJECTION INTRAMUSCULAR; INTRAVENOUS; SUBCUTANEOUS at 15:46

## 2019-01-01 RX ADMIN — MORPHINE SULFATE 2 MILLIGRAM(S): 50 CAPSULE, EXTENDED RELEASE ORAL at 13:53

## 2019-01-01 RX ADMIN — OXYCODONE HYDROCHLORIDE 60 MILLIGRAM(S): 5 TABLET ORAL at 05:51

## 2019-01-01 RX ADMIN — OXYCODONE HYDROCHLORIDE 50 MILLIGRAM(S): 5 TABLET ORAL at 17:07

## 2019-01-01 RX ADMIN — SODIUM CHLORIDE 1000 MILLILITER(S): 9 INJECTION, SOLUTION INTRAVENOUS at 23:04

## 2019-01-01 RX ADMIN — Medication 1 TABLET(S): at 21:24

## 2019-01-01 RX ADMIN — Medication 100 MILLIGRAM(S): at 05:40

## 2019-01-01 RX ADMIN — Medication 5 MILLIGRAM(S): at 09:51

## 2019-01-01 RX ADMIN — HEPARIN SODIUM 5000 UNIT(S): 5000 INJECTION INTRAVENOUS; SUBCUTANEOUS at 13:37

## 2019-01-01 RX ADMIN — Medication 650 MILLIGRAM(S): at 07:17

## 2019-01-01 RX ADMIN — HEPARIN SODIUM 5000 UNIT(S): 5000 INJECTION INTRAVENOUS; SUBCUTANEOUS at 22:08

## 2019-01-01 RX ADMIN — Medication 1 TABLET(S): at 06:03

## 2019-01-01 RX ADMIN — Medication 100 MILLIGRAM(S): at 05:53

## 2019-01-01 RX ADMIN — OXYCODONE HYDROCHLORIDE 20 MILLIGRAM(S): 5 TABLET ORAL at 19:31

## 2019-01-01 RX ADMIN — HEPARIN SODIUM 5000 UNIT(S): 5000 INJECTION INTRAVENOUS; SUBCUTANEOUS at 05:04

## 2019-01-01 RX ADMIN — METHADONE HYDROCHLORIDE 80 MILLIGRAM(S): 40 TABLET ORAL at 23:32

## 2019-01-01 RX ADMIN — SODIUM CHLORIDE 75 MILLILITER(S): 9 INJECTION, SOLUTION INTRAVENOUS at 01:00

## 2019-01-01 RX ADMIN — Medication 667 MILLIGRAM(S): at 16:58

## 2019-01-01 RX ADMIN — SODIUM CHLORIDE 75 MILLILITER(S): 9 INJECTION, SOLUTION INTRAVENOUS at 06:30

## 2019-01-01 RX ADMIN — Medication 325 MILLIGRAM(S): at 05:09

## 2019-01-01 RX ADMIN — Medication 1 TABLET(S): at 06:17

## 2019-01-01 RX ADMIN — Medication 650 MILLIGRAM(S): at 00:54

## 2019-01-01 RX ADMIN — Medication 1 APPLICATION(S): at 14:31

## 2019-01-01 RX ADMIN — Medication 2.5 MILLIGRAM(S): at 13:26

## 2019-01-01 RX ADMIN — OXYCODONE HYDROCHLORIDE 50 MILLIGRAM(S): 5 TABLET ORAL at 19:02

## 2019-01-01 RX ADMIN — METHADONE HYDROCHLORIDE 80 MILLIGRAM(S): 40 TABLET ORAL at 06:09

## 2019-01-01 RX ADMIN — Medication 0.5 MILLIGRAM(S): at 05:25

## 2019-01-01 RX ADMIN — METHADONE HYDROCHLORIDE 80 MILLIGRAM(S): 40 TABLET ORAL at 17:07

## 2019-01-01 RX ADMIN — HEPARIN SODIUM 5000 UNIT(S): 5000 INJECTION INTRAVENOUS; SUBCUTANEOUS at 18:07

## 2019-01-01 RX ADMIN — Medication 100 MILLIGRAM(S): at 18:07

## 2019-01-01 RX ADMIN — OXYCODONE HYDROCHLORIDE 20 MILLIGRAM(S): 5 TABLET ORAL at 11:35

## 2019-01-01 RX ADMIN — Medication 100 MILLIGRAM(S): at 21:27

## 2019-01-01 RX ADMIN — Medication 650 MILLIGRAM(S): at 06:10

## 2019-01-01 RX ADMIN — Medication 0.5 MILLIGRAM(S): at 06:37

## 2019-01-01 RX ADMIN — Medication 250 MILLIGRAM(S): at 23:29

## 2019-01-01 RX ADMIN — HEPARIN SODIUM 5000 UNIT(S): 5000 INJECTION INTRAVENOUS; SUBCUTANEOUS at 13:56

## 2019-01-01 RX ADMIN — METHADONE HYDROCHLORIDE 80 MILLIGRAM(S): 40 TABLET ORAL at 12:10

## 2019-01-01 RX ADMIN — OXYCODONE HYDROCHLORIDE 20 MILLIGRAM(S): 5 TABLET ORAL at 13:37

## 2019-01-01 RX ADMIN — HEPARIN SODIUM 5000 UNIT(S): 5000 INJECTION INTRAVENOUS; SUBCUTANEOUS at 13:02

## 2019-01-01 RX ADMIN — SODIUM CHLORIDE 1000 MILLILITER(S): 9 INJECTION, SOLUTION INTRAVENOUS at 20:23

## 2019-01-01 RX ADMIN — Medication 25 MILLIGRAM(S): at 21:31

## 2019-01-01 RX ADMIN — Medication 667 MILLIGRAM(S): at 07:37

## 2019-01-01 RX ADMIN — PANTOPRAZOLE SODIUM 40 MILLIGRAM(S): 20 TABLET, DELAYED RELEASE ORAL at 06:56

## 2019-01-01 RX ADMIN — CHLORHEXIDINE GLUCONATE 1 APPLICATION(S): 213 SOLUTION TOPICAL at 06:03

## 2019-01-01 RX ADMIN — SEVELAMER CARBONATE 1600 MILLIGRAM(S): 2400 POWDER, FOR SUSPENSION ORAL at 17:55

## 2019-01-01 RX ADMIN — Medication 650 MILLIGRAM(S): at 00:57

## 2019-01-01 RX ADMIN — METHADONE HYDROCHLORIDE 40 MILLIGRAM(S): 40 TABLET ORAL at 17:00

## 2019-01-01 RX ADMIN — Medication 5 MILLIGRAM(S): at 11:47

## 2019-01-01 RX ADMIN — Medication 650 MILLIGRAM(S): at 17:34

## 2019-01-01 RX ADMIN — MORPHINE SULFATE 2 MILLIGRAM(S): 50 CAPSULE, EXTENDED RELEASE ORAL at 20:57

## 2019-01-01 RX ADMIN — MORPHINE SULFATE 2 MILLIGRAM(S): 50 CAPSULE, EXTENDED RELEASE ORAL at 23:52

## 2019-01-01 RX ADMIN — METHADONE HYDROCHLORIDE 80 MILLIGRAM(S): 40 TABLET ORAL at 14:59

## 2019-01-01 RX ADMIN — HEPARIN SODIUM 5000 UNIT(S): 5000 INJECTION INTRAVENOUS; SUBCUTANEOUS at 21:52

## 2019-01-01 RX ADMIN — SODIUM CHLORIDE 75 MILLILITER(S): 9 INJECTION, SOLUTION INTRAVENOUS at 05:14

## 2019-01-01 RX ADMIN — METHADONE HYDROCHLORIDE 80 MILLIGRAM(S): 40 TABLET ORAL at 05:32

## 2019-01-01 RX ADMIN — Medication 1 TABLET(S): at 05:42

## 2019-01-01 RX ADMIN — HEPARIN SODIUM 5000 UNIT(S): 5000 INJECTION INTRAVENOUS; SUBCUTANEOUS at 13:38

## 2019-01-01 RX ADMIN — Medication 1 TABLET(S): at 05:25

## 2019-01-01 RX ADMIN — Medication 100 MICROGRAM(S): at 05:35

## 2019-01-01 RX ADMIN — Medication 650 MILLIGRAM(S): at 13:44

## 2019-01-01 RX ADMIN — METHADONE HYDROCHLORIDE 80 MILLIGRAM(S): 40 TABLET ORAL at 19:22

## 2019-01-01 RX ADMIN — METHADONE HYDROCHLORIDE 80 MILLIGRAM(S): 40 TABLET ORAL at 23:08

## 2019-01-01 RX ADMIN — GABAPENTIN 200 MILLIGRAM(S): 400 CAPSULE ORAL at 12:13

## 2019-01-01 RX ADMIN — Medication 650 MILLIGRAM(S): at 16:25

## 2019-01-01 RX ADMIN — Medication 10 MILLIGRAM(S): at 05:47

## 2019-01-01 RX ADMIN — Medication 0.5 MILLIGRAM(S): at 21:10

## 2019-01-01 RX ADMIN — Medication 2000 UNIT(S): at 12:34

## 2019-01-01 RX ADMIN — PANTOPRAZOLE SODIUM 40 MILLIGRAM(S): 20 TABLET, DELAYED RELEASE ORAL at 06:22

## 2019-01-01 RX ADMIN — OXYCODONE HYDROCHLORIDE 20 MILLIGRAM(S): 5 TABLET ORAL at 05:23

## 2019-01-01 RX ADMIN — Medication 1 APPLICATION(S): at 05:02

## 2019-01-01 RX ADMIN — OXYCODONE HYDROCHLORIDE 50 MILLIGRAM(S): 5 TABLET ORAL at 16:29

## 2019-01-01 RX ADMIN — CEFTRIAXONE 100 MILLIGRAM(S): 500 INJECTION, POWDER, FOR SOLUTION INTRAMUSCULAR; INTRAVENOUS at 00:44

## 2019-01-01 RX ADMIN — Medication 25 MILLILITER(S): at 23:35

## 2019-01-01 RX ADMIN — Medication 50 MILLILITER(S): at 15:39

## 2019-01-01 RX ADMIN — Medication 200 MEQ/KG/HR: at 11:00

## 2019-01-01 RX ADMIN — Medication 5 MILLIGRAM(S): at 19:05

## 2019-01-01 RX ADMIN — CHLORHEXIDINE GLUCONATE 1 APPLICATION(S): 213 SOLUTION TOPICAL at 17:52

## 2019-01-01 RX ADMIN — Medication 1 APPLICATION(S): at 17:57

## 2019-01-01 RX ADMIN — FLUDROCORTISONE ACETATE 0.1 MILLIGRAM(S): 0.1 TABLET ORAL at 06:01

## 2019-01-01 RX ADMIN — SODIUM CHLORIDE 50 MILLILITER(S): 9 INJECTION, SOLUTION INTRAVENOUS at 00:16

## 2019-01-01 RX ADMIN — OXYCODONE HYDROCHLORIDE 20 MILLIGRAM(S): 5 TABLET ORAL at 15:00

## 2019-01-01 RX ADMIN — SODIUM CHLORIDE 125 MILLILITER(S): 9 INJECTION, SOLUTION INTRAVENOUS at 00:49

## 2019-01-01 RX ADMIN — Medication 100 MILLIGRAM(S): at 21:32

## 2019-01-01 RX ADMIN — Medication 100 MILLIGRAM(S): at 06:55

## 2019-01-01 RX ADMIN — OXYCODONE HYDROCHLORIDE 20 MILLIGRAM(S): 5 TABLET ORAL at 19:00

## 2019-01-01 RX ADMIN — Medication 1000 UNIT(S): at 11:20

## 2019-01-01 RX ADMIN — METHADONE HYDROCHLORIDE 80 MILLIGRAM(S): 40 TABLET ORAL at 21:44

## 2019-01-01 RX ADMIN — Medication 100 MILLIGRAM(S): at 05:56

## 2019-01-01 RX ADMIN — HEPARIN SODIUM 5000 UNIT(S): 5000 INJECTION INTRAVENOUS; SUBCUTANEOUS at 05:52

## 2019-01-01 RX ADMIN — Medication 10 MILLIGRAM(S): at 06:10

## 2019-01-01 RX ADMIN — SEVELAMER CARBONATE 1600 MILLIGRAM(S): 2400 POWDER, FOR SUSPENSION ORAL at 08:34

## 2019-01-01 RX ADMIN — Medication 20 MILLIGRAM(S): at 05:25

## 2019-01-01 RX ADMIN — OXYCODONE HYDROCHLORIDE 60 MILLIGRAM(S): 5 TABLET ORAL at 08:32

## 2019-01-01 RX ADMIN — Medication 650 MILLIGRAM(S): at 01:02

## 2019-01-01 RX ADMIN — Medication 1 TABLET(S): at 13:15

## 2019-01-01 RX ADMIN — Medication 650 MILLIGRAM(S): at 18:10

## 2019-01-01 RX ADMIN — Medication 650 MILLIGRAM(S): at 06:00

## 2019-01-01 RX ADMIN — Medication 1 TABLET(S): at 15:47

## 2019-01-01 RX ADMIN — Medication 5 MILLIGRAM(S): at 18:48

## 2019-01-01 RX ADMIN — Medication 10 MILLIGRAM(S): at 05:15

## 2019-01-01 RX ADMIN — Medication 50 MILLILITER(S): at 16:11

## 2019-01-01 RX ADMIN — HEPARIN SODIUM 5000 UNIT(S): 5000 INJECTION INTRAVENOUS; SUBCUTANEOUS at 14:15

## 2019-01-01 RX ADMIN — HEPARIN SODIUM 5000 UNIT(S): 5000 INJECTION INTRAVENOUS; SUBCUTANEOUS at 13:47

## 2019-01-01 RX ADMIN — OXYCODONE HYDROCHLORIDE 30 MILLIGRAM(S): 5 TABLET ORAL at 23:49

## 2019-01-01 RX ADMIN — Medication 1 TABLET(S): at 05:22

## 2019-01-01 RX ADMIN — Medication 650 MILLIGRAM(S): at 01:32

## 2019-01-01 RX ADMIN — Medication 650 MILLIGRAM(S): at 05:17

## 2019-01-01 RX ADMIN — Medication 100 MILLIGRAM(S): at 14:28

## 2019-01-01 RX ADMIN — Medication 100 MILLIGRAM(S): at 05:51

## 2019-01-01 RX ADMIN — Medication 100 MILLIGRAM(S): at 17:35

## 2019-01-01 RX ADMIN — Medication 3.22 MICROGRAM(S)/KG/MIN: at 19:59

## 2019-01-01 RX ADMIN — OXYCODONE HYDROCHLORIDE 50 MILLIGRAM(S): 5 TABLET ORAL at 13:29

## 2019-01-01 RX ADMIN — Medication 325 MILLIGRAM(S): at 05:35

## 2019-01-01 RX ADMIN — Medication 5 MILLIGRAM(S): at 21:45

## 2019-01-01 RX ADMIN — Medication 5 MILLIGRAM(S): at 10:36

## 2019-01-01 RX ADMIN — Medication 667 MILLIGRAM(S): at 08:40

## 2019-01-01 RX ADMIN — OXYCODONE HYDROCHLORIDE 20 MILLIGRAM(S): 5 TABLET ORAL at 03:38

## 2019-01-01 RX ADMIN — SODIUM CHLORIDE 50 MILLILITER(S): 9 INJECTION INTRAMUSCULAR; INTRAVENOUS; SUBCUTANEOUS at 14:29

## 2019-01-01 RX ADMIN — OXYCODONE HYDROCHLORIDE 50 MILLIGRAM(S): 5 TABLET ORAL at 09:14

## 2019-01-01 RX ADMIN — OXYCODONE HYDROCHLORIDE 30 MILLIGRAM(S): 5 TABLET ORAL at 15:45

## 2019-01-01 RX ADMIN — HEPARIN SODIUM 5000 UNIT(S): 5000 INJECTION INTRAVENOUS; SUBCUTANEOUS at 05:47

## 2019-01-01 RX ADMIN — OXYCODONE HYDROCHLORIDE 50 MILLIGRAM(S): 5 TABLET ORAL at 01:49

## 2019-01-01 RX ADMIN — HEPARIN SODIUM 5000 UNIT(S): 5000 INJECTION INTRAVENOUS; SUBCUTANEOUS at 21:38

## 2019-01-01 RX ADMIN — METHADONE HYDROCHLORIDE 80 MILLIGRAM(S): 40 TABLET ORAL at 05:25

## 2019-01-01 RX ADMIN — Medication 100 MILLIGRAM(S): at 05:33

## 2019-01-01 RX ADMIN — Medication 1 TABLET(S): at 05:11

## 2019-01-01 RX ADMIN — OXYCODONE HYDROCHLORIDE 20 MILLIGRAM(S): 5 TABLET ORAL at 10:20

## 2019-01-01 RX ADMIN — HEPARIN SODIUM 5000 UNIT(S): 5000 INJECTION INTRAVENOUS; SUBCUTANEOUS at 05:16

## 2019-01-01 RX ADMIN — ERYTHROPOIETIN 5000 UNIT(S): 10000 INJECTION, SOLUTION INTRAVENOUS; SUBCUTANEOUS at 12:37

## 2019-01-01 RX ADMIN — PANTOPRAZOLE SODIUM 40 MILLIGRAM(S): 20 TABLET, DELAYED RELEASE ORAL at 17:30

## 2019-01-01 RX ADMIN — Medication 650 MILLIGRAM(S): at 00:21

## 2019-01-01 RX ADMIN — Medication 100 MILLIGRAM(S): at 14:49

## 2019-01-01 RX ADMIN — Medication 50 MICROGRAM(S): at 15:27

## 2019-01-01 RX ADMIN — Medication 100 MILLIGRAM(S): at 13:46

## 2019-01-01 RX ADMIN — SEVELAMER CARBONATE 1600 MILLIGRAM(S): 2400 POWDER, FOR SUSPENSION ORAL at 08:39

## 2019-01-01 RX ADMIN — Medication 500 MILLIGRAM(S): at 11:14

## 2019-01-01 RX ADMIN — Medication 100 MILLIGRAM(S): at 13:29

## 2019-01-01 RX ADMIN — Medication 10 MILLIGRAM(S): at 05:40

## 2019-01-01 RX ADMIN — OXYCODONE HYDROCHLORIDE 60 MILLIGRAM(S): 5 TABLET ORAL at 05:44

## 2019-01-01 RX ADMIN — Medication 650 MILLIGRAM(S): at 13:59

## 2019-01-01 RX ADMIN — Medication 650 MILLIGRAM(S): at 12:03

## 2019-01-01 RX ADMIN — Medication 100 MILLIGRAM(S): at 18:53

## 2019-01-01 RX ADMIN — Medication 2.5 MILLIGRAM(S): at 11:37

## 2019-01-01 RX ADMIN — METHADONE HYDROCHLORIDE 80 MILLIGRAM(S): 40 TABLET ORAL at 06:29

## 2019-01-01 RX ADMIN — Medication 5 MILLIGRAM(S): at 11:56

## 2019-01-01 RX ADMIN — AMLODIPINE BESYLATE 5 MILLIGRAM(S): 2.5 TABLET ORAL at 11:17

## 2019-01-01 RX ADMIN — METHADONE HYDROCHLORIDE 80 MILLIGRAM(S): 40 TABLET ORAL at 13:36

## 2019-01-01 RX ADMIN — Medication 100 MILLIGRAM(S): at 15:51

## 2019-01-01 RX ADMIN — Medication 667 MILLIGRAM(S): at 13:12

## 2019-01-01 RX ADMIN — Medication 650 MILLIGRAM(S): at 00:56

## 2019-01-01 RX ADMIN — HEPARIN SODIUM 5000 UNIT(S): 5000 INJECTION INTRAVENOUS; SUBCUTANEOUS at 05:13

## 2019-01-01 RX ADMIN — Medication 1 TABLET(S): at 22:11

## 2019-01-01 RX ADMIN — MORPHINE SULFATE 2 MILLIGRAM(S): 50 CAPSULE, EXTENDED RELEASE ORAL at 20:11

## 2019-01-01 RX ADMIN — Medication 100 MILLIGRAM(S): at 23:02

## 2019-01-01 RX ADMIN — Medication 650 MILLIGRAM(S): at 22:40

## 2019-01-01 RX ADMIN — Medication 650 MILLIGRAM(S): at 14:41

## 2019-01-01 RX ADMIN — MORPHINE SULFATE 2 MILLIGRAM(S): 50 CAPSULE, EXTENDED RELEASE ORAL at 20:54

## 2019-01-01 RX ADMIN — METHADONE HYDROCHLORIDE 80 MILLIGRAM(S): 40 TABLET ORAL at 14:06

## 2019-01-01 RX ADMIN — Medication 5 MILLIGRAM(S): at 18:54

## 2019-01-01 RX ADMIN — CHLORHEXIDINE GLUCONATE 1 APPLICATION(S): 213 SOLUTION TOPICAL at 06:23

## 2019-01-01 RX ADMIN — Medication 500 MILLIGRAM(S): at 11:57

## 2019-01-01 RX ADMIN — OXYCODONE AND ACETAMINOPHEN 1 TABLET(S): 5; 325 TABLET ORAL at 15:52

## 2019-01-01 RX ADMIN — HEPARIN SODIUM 5000 UNIT(S): 5000 INJECTION INTRAVENOUS; SUBCUTANEOUS at 13:29

## 2019-01-01 RX ADMIN — HEPARIN SODIUM 5000 UNIT(S): 5000 INJECTION INTRAVENOUS; SUBCUTANEOUS at 05:41

## 2019-01-01 RX ADMIN — HEPARIN SODIUM 5000 UNIT(S): 5000 INJECTION INTRAVENOUS; SUBCUTANEOUS at 21:18

## 2019-01-01 RX ADMIN — Medication 5 MILLIGRAM(S): at 22:24

## 2019-01-01 RX ADMIN — Medication 2000 UNIT(S): at 11:47

## 2019-01-01 RX ADMIN — OXYCODONE HYDROCHLORIDE 30 MILLIGRAM(S): 5 TABLET ORAL at 04:40

## 2019-01-01 RX ADMIN — Medication 5 MILLIGRAM(S): at 00:33

## 2019-01-01 RX ADMIN — SEVELAMER CARBONATE 800 MILLIGRAM(S): 2400 POWDER, FOR SUSPENSION ORAL at 11:58

## 2019-01-01 RX ADMIN — MORPHINE SULFATE 2 MILLIGRAM(S): 50 CAPSULE, EXTENDED RELEASE ORAL at 02:07

## 2019-01-01 RX ADMIN — SODIUM CHLORIDE 50 MILLILITER(S): 9 INJECTION, SOLUTION INTRAVENOUS at 20:38

## 2019-01-01 RX ADMIN — METHADONE HYDROCHLORIDE 80 MILLIGRAM(S): 40 TABLET ORAL at 21:45

## 2019-01-01 RX ADMIN — Medication 50 GRAM(S): at 19:40

## 2019-01-01 RX ADMIN — Medication 2 MILLIGRAM(S): at 22:02

## 2019-01-01 RX ADMIN — Medication 50 MILLILITER(S): at 09:29

## 2019-01-01 RX ADMIN — Medication 600 MILLILITER(S): at 01:58

## 2019-01-01 RX ADMIN — Medication 650 MILLIGRAM(S): at 18:30

## 2019-01-01 RX ADMIN — HEPARIN SODIUM 5000 UNIT(S): 5000 INJECTION INTRAVENOUS; SUBCUTANEOUS at 13:59

## 2019-01-01 RX ADMIN — Medication 650 MILLIGRAM(S): at 06:03

## 2019-01-01 RX ADMIN — HEPARIN SODIUM 5000 UNIT(S): 5000 INJECTION INTRAVENOUS; SUBCUTANEOUS at 21:36

## 2019-01-01 RX ADMIN — Medication 1 APPLICATION(S): at 17:02

## 2019-01-01 RX ADMIN — OXYCODONE HYDROCHLORIDE 60 MILLIGRAM(S): 5 TABLET ORAL at 08:08

## 2019-01-01 RX ADMIN — Medication 1 APPLICATION(S): at 17:35

## 2019-01-01 RX ADMIN — SEVELAMER CARBONATE 800 MILLIGRAM(S): 2400 POWDER, FOR SUSPENSION ORAL at 18:16

## 2019-01-01 RX ADMIN — Medication 650 MILLIGRAM(S): at 05:22

## 2019-01-01 RX ADMIN — Medication 5 MILLIGRAM(S): at 21:12

## 2019-01-01 RX ADMIN — Medication 100 MILLIGRAM(S): at 17:21

## 2019-01-01 RX ADMIN — SODIUM CHLORIDE 500 MILLILITER(S): 9 INJECTION, SOLUTION INTRAVENOUS at 23:03

## 2019-01-01 RX ADMIN — OXYCODONE HYDROCHLORIDE 20 MILLIGRAM(S): 5 TABLET ORAL at 10:58

## 2019-01-01 RX ADMIN — Medication 100 MICROGRAM(S): at 05:25

## 2019-01-01 RX ADMIN — Medication 1 TABLET(S): at 13:54

## 2019-01-01 RX ADMIN — Medication 10 MILLIGRAM(S): at 20:49

## 2019-01-01 RX ADMIN — METHADONE HYDROCHLORIDE 40 MILLIGRAM(S): 40 TABLET ORAL at 13:26

## 2019-01-01 RX ADMIN — SEVELAMER CARBONATE 800 MILLIGRAM(S): 2400 POWDER, FOR SUSPENSION ORAL at 14:04

## 2019-01-01 RX ADMIN — Medication 650 MILLIGRAM(S): at 05:12

## 2019-01-01 RX ADMIN — Medication 100 MILLIGRAM(S): at 05:00

## 2019-01-01 RX ADMIN — Medication 10 MILLIGRAM(S): at 05:09

## 2019-01-01 RX ADMIN — OXYCODONE HYDROCHLORIDE 50 MILLIGRAM(S): 5 TABLET ORAL at 01:32

## 2019-01-01 RX ADMIN — METHADONE HYDROCHLORIDE 80 MILLIGRAM(S): 40 TABLET ORAL at 17:15

## 2019-01-01 RX ADMIN — METHADONE HYDROCHLORIDE 80 MILLIGRAM(S): 40 TABLET ORAL at 05:19

## 2019-01-01 RX ADMIN — OXYCODONE HYDROCHLORIDE 50 MILLIGRAM(S): 5 TABLET ORAL at 00:37

## 2019-01-01 RX ADMIN — Medication 10 MILLIGRAM(S): at 05:43

## 2019-01-01 RX ADMIN — HEPARIN SODIUM 5000 UNIT(S): 5000 INJECTION INTRAVENOUS; SUBCUTANEOUS at 17:13

## 2019-01-01 RX ADMIN — FLUDROCORTISONE ACETATE 0.1 MILLIGRAM(S): 0.1 TABLET ORAL at 05:25

## 2019-01-01 RX ADMIN — SODIUM CHLORIDE 75 MILLILITER(S): 9 INJECTION, SOLUTION INTRAVENOUS at 17:13

## 2019-01-01 RX ADMIN — MORPHINE SULFATE 2 MILLIGRAM(S): 50 CAPSULE, EXTENDED RELEASE ORAL at 03:06

## 2019-01-01 RX ADMIN — Medication 5 MILLIGRAM(S): at 17:12

## 2019-01-01 RX ADMIN — HEPARIN SODIUM 5000 UNIT(S): 5000 INJECTION INTRAVENOUS; SUBCUTANEOUS at 14:10

## 2019-01-01 RX ADMIN — PANTOPRAZOLE SODIUM 40 MILLIGRAM(S): 20 TABLET, DELAYED RELEASE ORAL at 17:42

## 2019-01-01 RX ADMIN — HEPARIN SODIUM 5000 UNIT(S): 5000 INJECTION INTRAVENOUS; SUBCUTANEOUS at 18:59

## 2019-01-01 RX ADMIN — HEPARIN SODIUM 5000 UNIT(S): 5000 INJECTION INTRAVENOUS; SUBCUTANEOUS at 11:35

## 2019-01-01 RX ADMIN — Medication 10 MILLIGRAM(S): at 17:44

## 2019-01-01 RX ADMIN — HEPARIN SODIUM 5000 UNIT(S): 5000 INJECTION INTRAVENOUS; SUBCUTANEOUS at 05:23

## 2019-01-01 RX ADMIN — METHADONE HYDROCHLORIDE 80 MILLIGRAM(S): 40 TABLET ORAL at 05:31

## 2019-01-01 RX ADMIN — SODIUM CHLORIDE 75 MILLILITER(S): 9 INJECTION, SOLUTION INTRAVENOUS at 12:06

## 2019-01-01 RX ADMIN — SEVELAMER CARBONATE 1600 MILLIGRAM(S): 2400 POWDER, FOR SUSPENSION ORAL at 11:39

## 2019-01-01 RX ADMIN — METHADONE HYDROCHLORIDE 80 MILLIGRAM(S): 40 TABLET ORAL at 23:51

## 2019-01-01 RX ADMIN — Medication 1 APPLICATION(S): at 12:03

## 2019-01-01 RX ADMIN — HEPARIN SODIUM 5000 UNIT(S): 5000 INJECTION INTRAVENOUS; SUBCUTANEOUS at 17:21

## 2019-01-01 RX ADMIN — OXYCODONE HYDROCHLORIDE 20 MILLIGRAM(S): 5 TABLET ORAL at 16:50

## 2019-01-01 RX ADMIN — HEPARIN SODIUM 5000 UNIT(S): 5000 INJECTION INTRAVENOUS; SUBCUTANEOUS at 18:52

## 2019-01-01 RX ADMIN — Medication 1 TABLET(S): at 22:41

## 2019-01-01 RX ADMIN — METHADONE HYDROCHLORIDE 40 MILLIGRAM(S): 40 TABLET ORAL at 06:01

## 2019-01-01 RX ADMIN — COSYNTROPIN 0.25 MILLIGRAM(S): 0.25 INJECTION, SOLUTION INTRAVENOUS at 14:03

## 2019-01-01 RX ADMIN — METHADONE HYDROCHLORIDE 80 MILLIGRAM(S): 40 TABLET ORAL at 13:04

## 2019-01-01 RX ADMIN — PANTOPRAZOLE SODIUM 40 MILLIGRAM(S): 20 TABLET, DELAYED RELEASE ORAL at 06:27

## 2019-01-01 RX ADMIN — Medication 50 GRAM(S): at 23:23

## 2019-01-01 RX ADMIN — Medication 2 MILLIGRAM(S): at 22:14

## 2019-01-01 RX ADMIN — SODIUM CHLORIDE 1000 MILLILITER(S): 9 INJECTION, SOLUTION INTRAVENOUS at 18:55

## 2019-01-01 RX ADMIN — Medication 500 MILLIGRAM(S): at 12:36

## 2019-01-01 RX ADMIN — CHLORHEXIDINE GLUCONATE 1 APPLICATION(S): 213 SOLUTION TOPICAL at 05:03

## 2019-01-01 RX ADMIN — OXYCODONE HYDROCHLORIDE 30 MILLIGRAM(S): 5 TABLET ORAL at 12:56

## 2019-01-01 RX ADMIN — SODIUM CHLORIDE 1000 MILLILITER(S): 9 INJECTION INTRAMUSCULAR; INTRAVENOUS; SUBCUTANEOUS at 20:10

## 2019-01-01 RX ADMIN — MUPIROCIN 1 APPLICATION(S): 20 OINTMENT TOPICAL at 17:12

## 2019-01-01 RX ADMIN — CHLORHEXIDINE GLUCONATE 1 APPLICATION(S): 213 SOLUTION TOPICAL at 05:17

## 2019-01-01 RX ADMIN — OXYCODONE HYDROCHLORIDE 40 MILLIGRAM(S): 5 TABLET ORAL at 05:24

## 2019-01-01 RX ADMIN — Medication 1 APPLICATION(S): at 05:04

## 2019-01-01 RX ADMIN — GABAPENTIN 200 MILLIGRAM(S): 400 CAPSULE ORAL at 11:28

## 2019-01-01 RX ADMIN — HEPARIN SODIUM 5000 UNIT(S): 5000 INJECTION INTRAVENOUS; SUBCUTANEOUS at 18:08

## 2019-01-01 RX ADMIN — Medication 1 MILLIGRAM(S): at 12:17

## 2019-01-01 RX ADMIN — HEPARIN SODIUM 5000 UNIT(S): 5000 INJECTION INTRAVENOUS; SUBCUTANEOUS at 05:12

## 2019-01-01 RX ADMIN — METHADONE HYDROCHLORIDE 80 MILLIGRAM(S): 40 TABLET ORAL at 05:03

## 2019-01-01 RX ADMIN — HEPARIN SODIUM 5000 UNIT(S): 5000 INJECTION INTRAVENOUS; SUBCUTANEOUS at 21:54

## 2019-01-01 RX ADMIN — HEPARIN SODIUM 5000 UNIT(S): 5000 INJECTION INTRAVENOUS; SUBCUTANEOUS at 05:00

## 2019-01-01 RX ADMIN — Medication 500 MILLIGRAM(S): at 12:17

## 2019-01-01 RX ADMIN — OXYCODONE HYDROCHLORIDE 20 MILLIGRAM(S): 5 TABLET ORAL at 11:31

## 2019-01-01 RX ADMIN — Medication 650 MILLIGRAM(S): at 17:13

## 2019-01-01 RX ADMIN — OXYCODONE AND ACETAMINOPHEN 1 TABLET(S): 5; 325 TABLET ORAL at 14:48

## 2019-01-01 RX ADMIN — METHADONE HYDROCHLORIDE 80 MILLIGRAM(S): 40 TABLET ORAL at 00:03

## 2019-01-01 RX ADMIN — OXYCODONE HYDROCHLORIDE 50 MILLIGRAM(S): 5 TABLET ORAL at 18:15

## 2019-01-01 RX ADMIN — OXYCODONE HYDROCHLORIDE 40 MILLIGRAM(S): 5 TABLET ORAL at 20:06

## 2019-01-01 RX ADMIN — METHADONE HYDROCHLORIDE 80 MILLIGRAM(S): 40 TABLET ORAL at 23:35

## 2019-01-01 RX ADMIN — Medication 2.5 MILLIGRAM(S): at 17:33

## 2019-01-01 RX ADMIN — Medication 2.5 MILLIGRAM(S): at 05:05

## 2019-01-01 RX ADMIN — MEROPENEM 100 MILLIGRAM(S): 1 INJECTION INTRAVENOUS at 06:33

## 2019-01-01 RX ADMIN — HEPARIN SODIUM 5000 UNIT(S): 5000 INJECTION INTRAVENOUS; SUBCUTANEOUS at 06:52

## 2019-01-01 RX ADMIN — Medication 650 MILLIGRAM(S): at 23:46

## 2019-01-01 RX ADMIN — Medication 500 MILLIGRAM(S): at 11:27

## 2019-01-01 RX ADMIN — MORPHINE SULFATE 2 MILLIGRAM(S): 50 CAPSULE, EXTENDED RELEASE ORAL at 05:06

## 2019-01-01 RX ADMIN — LIOTHYRONINE SODIUM 25 MICROGRAM(S): 25 TABLET ORAL at 05:25

## 2019-01-01 RX ADMIN — HEPARIN SODIUM 5000 UNIT(S): 5000 INJECTION INTRAVENOUS; SUBCUTANEOUS at 14:16

## 2019-01-01 RX ADMIN — Medication 5 MILLIGRAM(S): at 13:53

## 2019-01-01 RX ADMIN — Medication 20 MILLIEQUIVALENT(S): at 12:13

## 2019-01-01 RX ADMIN — OXYCODONE HYDROCHLORIDE 60 MILLIGRAM(S): 5 TABLET ORAL at 17:59

## 2019-01-01 RX ADMIN — METHADONE HYDROCHLORIDE 40 MILLIGRAM(S): 40 TABLET ORAL at 17:02

## 2019-01-01 RX ADMIN — HEPARIN SODIUM 5000 UNIT(S): 5000 INJECTION INTRAVENOUS; SUBCUTANEOUS at 21:58

## 2019-01-01 RX ADMIN — Medication 650 MILLIGRAM(S): at 15:14

## 2019-01-01 RX ADMIN — Medication 10 MILLIGRAM(S): at 17:11

## 2019-01-01 RX ADMIN — SODIUM CHLORIDE 100 MILLILITER(S): 9 INJECTION, SOLUTION INTRAVENOUS at 13:30

## 2019-01-01 RX ADMIN — Medication 10 MILLIGRAM(S): at 05:21

## 2019-01-01 RX ADMIN — SODIUM CHLORIDE 75 MILLILITER(S): 9 INJECTION, SOLUTION INTRAVENOUS at 19:08

## 2019-01-01 RX ADMIN — CEFTRIAXONE 100 GRAM(S): 500 INJECTION, POWDER, FOR SOLUTION INTRAMUSCULAR; INTRAVENOUS at 19:31

## 2019-01-01 RX ADMIN — METHADONE HYDROCHLORIDE 80 MILLIGRAM(S): 40 TABLET ORAL at 11:27

## 2019-01-01 RX ADMIN — OXYCODONE HYDROCHLORIDE 60 MILLIGRAM(S): 5 TABLET ORAL at 17:08

## 2019-01-01 RX ADMIN — METHADONE HYDROCHLORIDE 80 MILLIGRAM(S): 40 TABLET ORAL at 22:13

## 2019-01-01 RX ADMIN — PANTOPRAZOLE SODIUM 40 MILLIGRAM(S): 20 TABLET, DELAYED RELEASE ORAL at 05:15

## 2019-01-01 RX ADMIN — OXYCODONE HYDROCHLORIDE 60 MILLIGRAM(S): 5 TABLET ORAL at 17:52

## 2019-01-01 RX ADMIN — Medication 650 MILLIGRAM(S): at 13:24

## 2019-01-01 RX ADMIN — MUPIROCIN 1 APPLICATION(S): 20 OINTMENT TOPICAL at 06:10

## 2019-01-01 RX ADMIN — SEVELAMER CARBONATE 1600 MILLIGRAM(S): 2400 POWDER, FOR SUSPENSION ORAL at 08:13

## 2019-01-01 RX ADMIN — HEPARIN SODIUM 5000 UNIT(S): 5000 INJECTION INTRAVENOUS; SUBCUTANEOUS at 13:36

## 2019-01-01 RX ADMIN — METHADONE HYDROCHLORIDE 80 MILLIGRAM(S): 40 TABLET ORAL at 23:16

## 2019-01-01 RX ADMIN — OXYCODONE HYDROCHLORIDE 20 MILLIGRAM(S): 5 TABLET ORAL at 15:19

## 2019-01-01 RX ADMIN — HEPARIN SODIUM 5000 UNIT(S): 5000 INJECTION INTRAVENOUS; SUBCUTANEOUS at 13:23

## 2019-01-01 RX ADMIN — METHADONE HYDROCHLORIDE 80 MILLIGRAM(S): 40 TABLET ORAL at 05:46

## 2019-01-01 RX ADMIN — Medication 10 MILLIGRAM(S): at 05:26

## 2019-01-01 RX ADMIN — Medication 2.5 MILLIGRAM(S): at 05:41

## 2019-01-01 RX ADMIN — Medication 50 GRAM(S): at 13:21

## 2019-01-01 RX ADMIN — MORPHINE SULFATE 2 MILLIGRAM(S): 50 CAPSULE, EXTENDED RELEASE ORAL at 07:22

## 2019-01-01 RX ADMIN — Medication 1 TABLET(S): at 13:59

## 2019-01-01 RX ADMIN — PANTOPRAZOLE SODIUM 40 MILLIGRAM(S): 20 TABLET, DELAYED RELEASE ORAL at 18:07

## 2019-01-01 RX ADMIN — OXYCODONE HYDROCHLORIDE 20 MILLIGRAM(S): 5 TABLET ORAL at 14:49

## 2019-01-01 RX ADMIN — METHADONE HYDROCHLORIDE 80 MILLIGRAM(S): 40 TABLET ORAL at 11:34

## 2019-01-01 RX ADMIN — CEFTRIAXONE 100 MILLIGRAM(S): 500 INJECTION, POWDER, FOR SOLUTION INTRAMUSCULAR; INTRAVENOUS at 18:55

## 2019-01-01 RX ADMIN — Medication 5 MILLIGRAM(S): at 02:22

## 2019-01-01 RX ADMIN — Medication 0.5 MILLIGRAM(S): at 00:11

## 2019-01-01 RX ADMIN — Medication 650 MILLIGRAM(S): at 05:21

## 2019-01-01 RX ADMIN — Medication 5 MILLIGRAM(S): at 04:42

## 2019-01-01 RX ADMIN — Medication 500 MILLIGRAM(S): at 11:56

## 2019-01-01 RX ADMIN — Medication 650 MILLIGRAM(S): at 06:50

## 2019-01-01 RX ADMIN — MUPIROCIN 1 APPLICATION(S): 20 OINTMENT TOPICAL at 06:04

## 2019-01-01 RX ADMIN — METHADONE HYDROCHLORIDE 80 MILLIGRAM(S): 40 TABLET ORAL at 21:14

## 2019-01-01 RX ADMIN — SEVELAMER CARBONATE 1600 MILLIGRAM(S): 2400 POWDER, FOR SUSPENSION ORAL at 13:37

## 2019-01-01 RX ADMIN — OXYCODONE HYDROCHLORIDE 30 MILLIGRAM(S): 5 TABLET ORAL at 06:56

## 2019-01-01 RX ADMIN — SENNA PLUS 2 TABLET(S): 8.6 TABLET ORAL at 12:45

## 2019-01-01 RX ADMIN — OXYCODONE HYDROCHLORIDE 60 MILLIGRAM(S): 5 TABLET ORAL at 23:07

## 2019-01-01 RX ADMIN — ETOMIDATE 15 MILLIGRAM(S): 2 INJECTION INTRAVENOUS at 00:10

## 2019-01-01 RX ADMIN — ALTEPLASE 2 MILLIGRAM(S): KIT at 10:33

## 2019-01-01 RX ADMIN — AMLODIPINE BESYLATE 5 MILLIGRAM(S): 2.5 TABLET ORAL at 11:29

## 2019-01-01 RX ADMIN — Medication 2000 UNIT(S): at 18:53

## 2019-01-01 RX ADMIN — SODIUM CHLORIDE 100 MILLILITER(S): 9 INJECTION INTRAMUSCULAR; INTRAVENOUS; SUBCUTANEOUS at 15:29

## 2019-01-01 RX ADMIN — METHADONE HYDROCHLORIDE 80 MILLIGRAM(S): 40 TABLET ORAL at 17:02

## 2019-01-01 RX ADMIN — Medication 650 MILLIGRAM(S): at 15:24

## 2019-01-01 RX ADMIN — MORPHINE SULFATE 2 MILLIGRAM(S): 50 CAPSULE, EXTENDED RELEASE ORAL at 20:08

## 2019-01-01 RX ADMIN — PANTOPRAZOLE SODIUM 40 MILLIGRAM(S): 20 TABLET, DELAYED RELEASE ORAL at 05:31

## 2019-01-01 RX ADMIN — Medication 2 MILLIGRAM(S): at 22:04

## 2019-01-01 RX ADMIN — MUPIROCIN 1 APPLICATION(S): 20 OINTMENT TOPICAL at 17:30

## 2019-01-01 RX ADMIN — MORPHINE SULFATE 2 MILLIGRAM(S): 50 CAPSULE, EXTENDED RELEASE ORAL at 18:49

## 2019-01-01 RX ADMIN — HEPARIN SODIUM 5000 UNIT(S): 5000 INJECTION INTRAVENOUS; SUBCUTANEOUS at 21:30

## 2019-01-01 RX ADMIN — METHADONE HYDROCHLORIDE 80 MILLIGRAM(S): 40 TABLET ORAL at 00:44

## 2019-01-01 RX ADMIN — Medication 1 APPLICATION(S): at 06:42

## 2019-01-01 RX ADMIN — MORPHINE SULFATE 2 MILLIGRAM(S): 50 CAPSULE, EXTENDED RELEASE ORAL at 10:16

## 2019-01-01 RX ADMIN — Medication 5 MILLIGRAM(S): at 20:46

## 2019-01-01 RX ADMIN — SODIUM CHLORIDE 100 MILLILITER(S): 9 INJECTION INTRAMUSCULAR; INTRAVENOUS; SUBCUTANEOUS at 18:28

## 2019-01-01 RX ADMIN — Medication 100 MILLIGRAM(S): at 22:11

## 2019-01-01 RX ADMIN — Medication 650 MILLIGRAM(S): at 15:02

## 2019-01-01 RX ADMIN — Medication 650 MILLIGRAM(S): at 21:42

## 2019-01-01 RX ADMIN — OXYCODONE HYDROCHLORIDE 20 MILLIGRAM(S): 5 TABLET ORAL at 20:01

## 2019-01-01 RX ADMIN — Medication 100 MICROGRAM(S): at 05:42

## 2019-01-01 RX ADMIN — METHADONE HYDROCHLORIDE 80 MILLIGRAM(S): 40 TABLET ORAL at 11:17

## 2019-01-01 RX ADMIN — Medication 1 TABLET(S): at 21:47

## 2019-01-01 RX ADMIN — Medication 10 MILLIGRAM(S): at 17:07

## 2019-01-01 RX ADMIN — OXYCODONE HYDROCHLORIDE 20 MILLIGRAM(S): 5 TABLET ORAL at 15:10

## 2019-01-01 RX ADMIN — MORPHINE SULFATE 2 MILLIGRAM(S): 50 CAPSULE, EXTENDED RELEASE ORAL at 08:06

## 2019-01-01 RX ADMIN — NYSTATIN CREAM 1 APPLICATION(S): 100000 CREAM TOPICAL at 05:16

## 2019-01-01 RX ADMIN — METHADONE HYDROCHLORIDE 80 MILLIGRAM(S): 40 TABLET ORAL at 05:53

## 2019-01-01 RX ADMIN — NYSTATIN CREAM 1 APPLICATION(S): 100000 CREAM TOPICAL at 05:06

## 2019-01-01 RX ADMIN — CHLORHEXIDINE GLUCONATE 1 APPLICATION(S): 213 SOLUTION TOPICAL at 05:08

## 2019-01-01 RX ADMIN — Medication 1 APPLICATION(S): at 17:34

## 2019-01-01 RX ADMIN — Medication 650 MILLIGRAM(S): at 00:43

## 2019-01-01 RX ADMIN — MORPHINE SULFATE 2 MILLIGRAM(S): 50 CAPSULE, EXTENDED RELEASE ORAL at 02:48

## 2019-01-01 RX ADMIN — Medication 50 MILLIGRAM(S): at 12:36

## 2019-01-01 RX ADMIN — Medication 667 MILLIGRAM(S): at 17:54

## 2019-01-01 RX ADMIN — Medication 100 MICROGRAM(S): at 06:30

## 2019-01-01 RX ADMIN — HEPARIN SODIUM 5000 UNIT(S): 5000 INJECTION INTRAVENOUS; SUBCUTANEOUS at 21:14

## 2019-01-01 RX ADMIN — MUPIROCIN 1 APPLICATION(S): 20 OINTMENT TOPICAL at 05:28

## 2019-01-01 RX ADMIN — Medication 2 MILLIGRAM(S): at 21:31

## 2019-01-01 RX ADMIN — ERGOCALCIFEROL 50000 UNIT(S): 1.25 CAPSULE ORAL at 21:37

## 2019-01-01 RX ADMIN — Medication 2000 UNIT(S): at 11:29

## 2019-01-01 RX ADMIN — METHADONE HYDROCHLORIDE 40 MILLIGRAM(S): 40 TABLET ORAL at 05:16

## 2019-01-01 RX ADMIN — Medication 5 MILLIGRAM(S): at 23:52

## 2019-01-01 RX ADMIN — Medication 650 MILLIGRAM(S): at 21:24

## 2019-01-01 RX ADMIN — Medication 650 MILLIGRAM(S): at 13:18

## 2019-01-01 RX ADMIN — Medication 100 MILLIGRAM(S): at 05:41

## 2019-01-01 RX ADMIN — Medication 100 MILLIGRAM(S): at 13:10

## 2019-01-01 RX ADMIN — Medication 100 MILLIGRAM(S): at 05:46

## 2019-01-01 RX ADMIN — SEVELAMER CARBONATE 1600 MILLIGRAM(S): 2400 POWDER, FOR SUSPENSION ORAL at 07:55

## 2019-01-01 RX ADMIN — METHADONE HYDROCHLORIDE 80 MILLIGRAM(S): 40 TABLET ORAL at 14:19

## 2019-01-01 RX ADMIN — METHADONE HYDROCHLORIDE 40 MILLIGRAM(S): 40 TABLET ORAL at 05:19

## 2019-01-01 RX ADMIN — Medication 650 MILLIGRAM(S): at 14:11

## 2019-01-01 RX ADMIN — Medication 650 MILLIGRAM(S): at 21:30

## 2019-01-01 RX ADMIN — METHADONE HYDROCHLORIDE 80 MILLIGRAM(S): 40 TABLET ORAL at 18:06

## 2019-01-01 RX ADMIN — Medication 667 MILLIGRAM(S): at 08:20

## 2019-01-01 RX ADMIN — Medication 650 MILLIGRAM(S): at 13:26

## 2019-01-01 RX ADMIN — HEPARIN SODIUM 5000 UNIT(S): 5000 INJECTION INTRAVENOUS; SUBCUTANEOUS at 05:32

## 2019-01-01 RX ADMIN — CEFTRIAXONE 100 MILLIGRAM(S): 500 INJECTION, POWDER, FOR SOLUTION INTRAMUSCULAR; INTRAVENOUS at 00:19

## 2019-01-01 RX ADMIN — HEPARIN SODIUM 5000 UNIT(S): 5000 INJECTION INTRAVENOUS; SUBCUTANEOUS at 20:50

## 2019-01-01 RX ADMIN — Medication 0.5 MILLIGRAM(S): at 21:04

## 2019-01-01 RX ADMIN — Medication 650 MILLIGRAM(S): at 12:28

## 2019-01-01 RX ADMIN — GABAPENTIN 200 MILLIGRAM(S): 400 CAPSULE ORAL at 12:11

## 2019-01-01 RX ADMIN — Medication 650 MILLIGRAM(S): at 21:38

## 2019-01-01 RX ADMIN — METHADONE HYDROCHLORIDE 80 MILLIGRAM(S): 40 TABLET ORAL at 11:57

## 2019-01-01 RX ADMIN — MEROPENEM 100 MILLIGRAM(S): 1 INJECTION INTRAVENOUS at 07:46

## 2019-01-01 RX ADMIN — MORPHINE SULFATE 2 MILLIGRAM(S): 50 CAPSULE, EXTENDED RELEASE ORAL at 22:17

## 2019-01-01 RX ADMIN — HEPARIN SODIUM 5000 UNIT(S): 5000 INJECTION INTRAVENOUS; SUBCUTANEOUS at 21:22

## 2019-01-01 RX ADMIN — METHADONE HYDROCHLORIDE 80 MILLIGRAM(S): 40 TABLET ORAL at 12:43

## 2019-01-01 RX ADMIN — HEPARIN SODIUM 5000 UNIT(S): 5000 INJECTION INTRAVENOUS; SUBCUTANEOUS at 13:18

## 2019-01-01 RX ADMIN — Medication 2000 UNIT(S): at 11:12

## 2019-01-01 RX ADMIN — MORPHINE SULFATE 2 MILLIGRAM(S): 50 CAPSULE, EXTENDED RELEASE ORAL at 17:12

## 2019-01-01 RX ADMIN — OXYCODONE HYDROCHLORIDE 20 MILLIGRAM(S): 5 TABLET ORAL at 22:09

## 2019-01-01 RX ADMIN — OXYCODONE HYDROCHLORIDE 20 MILLIGRAM(S): 5 TABLET ORAL at 11:28

## 2019-01-01 RX ADMIN — OXYCODONE HYDROCHLORIDE 20 MILLIGRAM(S): 5 TABLET ORAL at 22:13

## 2019-01-01 RX ADMIN — Medication 250 MILLIGRAM(S): at 00:00

## 2019-01-01 RX ADMIN — CEFTRIAXONE 100 MILLIGRAM(S): 500 INJECTION, POWDER, FOR SOLUTION INTRAMUSCULAR; INTRAVENOUS at 17:06

## 2019-01-01 RX ADMIN — Medication 1 APPLICATION(S): at 05:24

## 2019-01-01 RX ADMIN — Medication 667 MILLIGRAM(S): at 08:26

## 2019-01-01 RX ADMIN — PANTOPRAZOLE SODIUM 40 MILLIGRAM(S): 20 TABLET, DELAYED RELEASE ORAL at 17:07

## 2019-01-01 RX ADMIN — MORPHINE SULFATE 2 MILLIGRAM(S): 50 CAPSULE, EXTENDED RELEASE ORAL at 20:43

## 2019-01-01 RX ADMIN — Medication 25 MILLIGRAM(S): at 22:02

## 2019-01-01 RX ADMIN — HEPARIN SODIUM 5000 UNIT(S): 5000 INJECTION INTRAVENOUS; SUBCUTANEOUS at 13:46

## 2019-01-01 RX ADMIN — Medication 100 MILLIGRAM(S): at 16:48

## 2019-01-01 RX ADMIN — MORPHINE SULFATE 2 MILLIGRAM(S): 50 CAPSULE, EXTENDED RELEASE ORAL at 04:11

## 2019-01-01 RX ADMIN — Medication 650 MILLIGRAM(S): at 13:38

## 2019-01-01 RX ADMIN — Medication 10 MILLIGRAM(S): at 05:05

## 2019-01-01 RX ADMIN — Medication 100 GRAM(S): at 11:34

## 2019-01-01 RX ADMIN — Medication 4000 UNIT(S): at 11:56

## 2019-01-01 RX ADMIN — Medication 10 MILLIGRAM(S): at 17:55

## 2019-01-01 RX ADMIN — SODIUM CHLORIDE 75 MILLILITER(S): 9 INJECTION, SOLUTION INTRAVENOUS at 22:17

## 2019-01-01 RX ADMIN — OXYCODONE HYDROCHLORIDE 60 MILLIGRAM(S): 5 TABLET ORAL at 06:31

## 2019-01-01 RX ADMIN — HEPARIN SODIUM 5000 UNIT(S): 5000 INJECTION INTRAVENOUS; SUBCUTANEOUS at 14:11

## 2019-01-01 RX ADMIN — METHADONE HYDROCHLORIDE 80 MILLIGRAM(S): 40 TABLET ORAL at 05:02

## 2019-01-01 RX ADMIN — HEPARIN SODIUM 5000 UNIT(S): 5000 INJECTION INTRAVENOUS; SUBCUTANEOUS at 05:21

## 2019-01-01 RX ADMIN — MAGNESIUM OXIDE 400 MG ORAL TABLET 400 MILLIGRAM(S): 241.3 TABLET ORAL at 08:39

## 2019-01-01 RX ADMIN — Medication 2.5 MILLIGRAM(S): at 17:44

## 2019-01-01 RX ADMIN — MORPHINE SULFATE 2 MILLIGRAM(S): 50 CAPSULE, EXTENDED RELEASE ORAL at 14:55

## 2019-01-01 RX ADMIN — SODIUM CHLORIDE 125 MILLILITER(S): 9 INJECTION, SOLUTION INTRAVENOUS at 15:16

## 2019-01-01 RX ADMIN — Medication 50 MILLILITER(S): at 20:21

## 2019-01-01 RX ADMIN — Medication 100 MILLIGRAM(S): at 21:43

## 2019-01-01 RX ADMIN — METHADONE HYDROCHLORIDE 80 MILLIGRAM(S): 40 TABLET ORAL at 05:26

## 2019-01-01 RX ADMIN — HEPARIN SODIUM 5000 UNIT(S): 5000 INJECTION INTRAVENOUS; SUBCUTANEOUS at 21:06

## 2019-01-01 RX ADMIN — CHLORHEXIDINE GLUCONATE 1 APPLICATION(S): 213 SOLUTION TOPICAL at 05:48

## 2019-01-01 RX ADMIN — Medication 650 MILLIGRAM(S): at 18:34

## 2019-01-01 RX ADMIN — OXYCODONE HYDROCHLORIDE 20 MILLIGRAM(S): 5 TABLET ORAL at 20:15

## 2019-01-01 RX ADMIN — Medication 100 MICROGRAM(S): at 05:08

## 2019-01-01 RX ADMIN — SODIUM POLYSTYRENE SULFONATE 15 GRAM(S): 4.1 POWDER, FOR SUSPENSION ORAL at 19:07

## 2019-01-01 RX ADMIN — PANTOPRAZOLE SODIUM 40 MILLIGRAM(S): 20 TABLET, DELAYED RELEASE ORAL at 17:55

## 2019-01-01 RX ADMIN — OXYCODONE HYDROCHLORIDE 50 MILLIGRAM(S): 5 TABLET ORAL at 12:47

## 2019-01-01 RX ADMIN — Medication 10 MILLIGRAM(S): at 17:24

## 2019-01-01 RX ADMIN — CEFTRIAXONE 100 MILLIGRAM(S): 500 INJECTION, POWDER, FOR SOLUTION INTRAMUSCULAR; INTRAVENOUS at 18:10

## 2019-01-01 RX ADMIN — Medication 650 MILLIGRAM(S): at 13:47

## 2019-01-01 RX ADMIN — Medication 650 MILLIGRAM(S): at 02:24

## 2019-01-01 RX ADMIN — Medication 100 MILLIGRAM(S): at 05:50

## 2019-01-01 RX ADMIN — OXYCODONE HYDROCHLORIDE 60 MILLIGRAM(S): 5 TABLET ORAL at 15:49

## 2019-01-01 RX ADMIN — NYSTATIN CREAM 1 APPLICATION(S): 100000 CREAM TOPICAL at 18:00

## 2019-01-01 RX ADMIN — METHADONE HYDROCHLORIDE 80 MILLIGRAM(S): 40 TABLET ORAL at 17:24

## 2019-01-01 RX ADMIN — Medication 5 MILLIGRAM(S): at 17:48

## 2019-01-01 RX ADMIN — MEROPENEM 100 MILLIGRAM(S): 1 INJECTION INTRAVENOUS at 06:55

## 2019-01-01 RX ADMIN — SODIUM CHLORIDE 75 MILLILITER(S): 9 INJECTION, SOLUTION INTRAVENOUS at 18:05

## 2019-01-01 RX ADMIN — Medication 667 MILLIGRAM(S): at 17:07

## 2019-01-01 RX ADMIN — Medication 100 MILLIGRAM(S): at 06:11

## 2019-01-01 RX ADMIN — Medication 650 MILLIGRAM(S): at 13:11

## 2019-01-01 RX ADMIN — MORPHINE SULFATE 2 MILLIGRAM(S): 50 CAPSULE, EXTENDED RELEASE ORAL at 17:55

## 2019-01-01 RX ADMIN — METHADONE HYDROCHLORIDE 40 MILLIGRAM(S): 40 TABLET ORAL at 18:00

## 2019-01-01 RX ADMIN — NYSTATIN CREAM 1 APPLICATION(S): 100000 CREAM TOPICAL at 17:41

## 2019-01-01 RX ADMIN — Medication 100 MICROGRAM(S): at 05:03

## 2019-01-01 RX ADMIN — Medication 650 MILLIGRAM(S): at 23:42

## 2019-01-01 RX ADMIN — Medication 50 GRAM(S): at 10:53

## 2019-01-01 RX ADMIN — METHADONE HYDROCHLORIDE 80 MILLIGRAM(S): 40 TABLET ORAL at 17:54

## 2019-01-01 RX ADMIN — HEPARIN SODIUM 5000 UNIT(S): 5000 INJECTION INTRAVENOUS; SUBCUTANEOUS at 21:51

## 2019-01-01 RX ADMIN — OXYCODONE HYDROCHLORIDE 30 MILLIGRAM(S): 5 TABLET ORAL at 21:59

## 2019-01-01 RX ADMIN — ERYTHROPOIETIN 7000 UNIT(S): 10000 INJECTION, SOLUTION INTRAVENOUS; SUBCUTANEOUS at 11:40

## 2019-01-01 RX ADMIN — METHADONE HYDROCHLORIDE 40 MILLIGRAM(S): 40 TABLET ORAL at 21:42

## 2019-01-01 RX ADMIN — OXYCODONE HYDROCHLORIDE 30 MILLIGRAM(S): 5 TABLET ORAL at 17:12

## 2019-01-01 RX ADMIN — MUPIROCIN 1 APPLICATION(S): 20 OINTMENT TOPICAL at 05:46

## 2019-01-01 RX ADMIN — Medication 650 MILLIGRAM(S): at 13:33

## 2019-01-01 RX ADMIN — Medication 1 MILLIGRAM(S): at 11:33

## 2019-01-01 RX ADMIN — Medication 100 MILLIGRAM(S): at 05:21

## 2019-01-01 RX ADMIN — Medication 5 MILLIGRAM(S): at 18:39

## 2019-01-01 RX ADMIN — OXYCODONE HYDROCHLORIDE 30 MILLIGRAM(S): 5 TABLET ORAL at 23:59

## 2019-01-01 RX ADMIN — OXYCODONE HYDROCHLORIDE 30 MILLIGRAM(S): 5 TABLET ORAL at 00:30

## 2019-01-01 RX ADMIN — FENTANYL CITRATE 3.43 MICROGRAM(S)/KG/HR: 50 INJECTION INTRAVENOUS at 10:22

## 2019-01-01 RX ADMIN — METHADONE HYDROCHLORIDE 80 MILLIGRAM(S): 40 TABLET ORAL at 23:33

## 2019-01-01 RX ADMIN — Medication 250 MILLIGRAM(S): at 21:58

## 2019-01-01 RX ADMIN — Medication 3.22 MICROGRAM(S)/KG/MIN: at 16:48

## 2019-01-01 RX ADMIN — SODIUM CHLORIDE 75 MILLILITER(S): 9 INJECTION, SOLUTION INTRAVENOUS at 21:55

## 2019-01-01 RX ADMIN — Medication 650 MILLIGRAM(S): at 05:48

## 2019-01-01 RX ADMIN — Medication 10 MILLIGRAM(S): at 05:53

## 2019-01-01 RX ADMIN — Medication 100 MILLIGRAM(S): at 05:57

## 2019-01-01 RX ADMIN — Medication 3.22 MICROGRAM(S)/KG/MIN: at 20:00

## 2019-01-01 RX ADMIN — METHADONE HYDROCHLORIDE 80 MILLIGRAM(S): 40 TABLET ORAL at 05:22

## 2019-01-01 RX ADMIN — HEPARIN SODIUM 5000 UNIT(S): 5000 INJECTION INTRAVENOUS; SUBCUTANEOUS at 13:48

## 2019-01-01 RX ADMIN — Medication 1 TABLET(S): at 13:23

## 2019-01-01 RX ADMIN — METHADONE HYDROCHLORIDE 80 MILLIGRAM(S): 40 TABLET ORAL at 11:31

## 2019-01-01 RX ADMIN — OXYCODONE HYDROCHLORIDE 20 MILLIGRAM(S): 5 TABLET ORAL at 12:05

## 2019-01-01 RX ADMIN — Medication 2.5 MILLIGRAM(S): at 06:01

## 2019-01-01 RX ADMIN — SODIUM CHLORIDE 75 MILLILITER(S): 9 INJECTION, SOLUTION INTRAVENOUS at 11:21

## 2019-01-01 RX ADMIN — Medication 100 MILLIGRAM(S): at 05:39

## 2019-01-01 RX ADMIN — METHADONE HYDROCHLORIDE 80 MILLIGRAM(S): 40 TABLET ORAL at 17:28

## 2019-01-01 RX ADMIN — OXYCODONE HYDROCHLORIDE 60 MILLIGRAM(S): 5 TABLET ORAL at 04:38

## 2019-01-01 RX ADMIN — CEFEPIME 100 MILLIGRAM(S): 1 INJECTION, POWDER, FOR SOLUTION INTRAMUSCULAR; INTRAVENOUS at 23:01

## 2019-01-01 RX ADMIN — Medication 1 TABLET(S): at 06:14

## 2019-01-01 RX ADMIN — Medication 650 MILLIGRAM(S): at 17:30

## 2019-01-01 RX ADMIN — Medication 2.5 MILLIGRAM(S): at 18:11

## 2019-01-01 RX ADMIN — PANTOPRAZOLE SODIUM 40 MILLIGRAM(S): 20 TABLET, DELAYED RELEASE ORAL at 08:59

## 2019-01-01 RX ADMIN — Medication 250 MILLIGRAM(S): at 01:59

## 2019-01-01 RX ADMIN — OXYCODONE HYDROCHLORIDE 50 MILLIGRAM(S): 5 TABLET ORAL at 00:41

## 2019-01-01 RX ADMIN — HEPARIN SODIUM 5000 UNIT(S): 5000 INJECTION INTRAVENOUS; SUBCUTANEOUS at 15:51

## 2019-01-01 RX ADMIN — Medication 1000 UNIT(S): at 11:19

## 2019-01-01 RX ADMIN — OXYCODONE HYDROCHLORIDE 50 MILLIGRAM(S): 5 TABLET ORAL at 15:07

## 2019-01-01 RX ADMIN — Medication 10 MILLIGRAM(S): at 18:26

## 2019-01-01 RX ADMIN — Medication 650 MILLIGRAM(S): at 15:51

## 2019-01-01 RX ADMIN — OXYCODONE HYDROCHLORIDE 40 MILLIGRAM(S): 5 TABLET ORAL at 18:34

## 2019-01-01 RX ADMIN — Medication 650 MILLIGRAM(S): at 19:02

## 2019-01-01 RX ADMIN — Medication 50 GRAM(S): at 12:44

## 2019-01-01 RX ADMIN — OXYCODONE HYDROCHLORIDE 20 MILLIGRAM(S): 5 TABLET ORAL at 20:29

## 2019-01-01 RX ADMIN — Medication 50 GRAM(S): at 08:49

## 2019-01-01 RX ADMIN — Medication 50 MILLIGRAM(S): at 12:45

## 2019-01-01 RX ADMIN — Medication 200 GRAM(S): at 17:45

## 2019-01-01 RX ADMIN — Medication 650 MILLIGRAM(S): at 02:12

## 2019-01-01 RX ADMIN — Medication 650 MILLIGRAM(S): at 17:09

## 2019-01-01 RX ADMIN — Medication 250 MILLIGRAM(S): at 13:26

## 2019-01-01 RX ADMIN — Medication 250 MILLIGRAM(S): at 11:18

## 2019-01-01 RX ADMIN — OXYCODONE HYDROCHLORIDE 20 MILLIGRAM(S): 5 TABLET ORAL at 10:22

## 2019-01-01 RX ADMIN — SEVELAMER CARBONATE 1600 MILLIGRAM(S): 2400 POWDER, FOR SUSPENSION ORAL at 17:26

## 2019-01-01 RX ADMIN — HEPARIN SODIUM 5000 UNIT(S): 5000 INJECTION INTRAVENOUS; SUBCUTANEOUS at 15:15

## 2019-01-01 RX ADMIN — PANTOPRAZOLE SODIUM 40 MILLIGRAM(S): 20 TABLET, DELAYED RELEASE ORAL at 06:43

## 2019-01-01 RX ADMIN — PANTOPRAZOLE SODIUM 40 MILLIGRAM(S): 20 TABLET, DELAYED RELEASE ORAL at 06:15

## 2019-01-01 RX ADMIN — Medication 325 MILLIGRAM(S): at 17:07

## 2019-01-01 RX ADMIN — Medication 2 MILLIGRAM(S): at 22:10

## 2019-01-01 RX ADMIN — SEVELAMER CARBONATE 800 MILLIGRAM(S): 2400 POWDER, FOR SUSPENSION ORAL at 17:02

## 2019-01-01 RX ADMIN — DEXMEDETOMIDINE HYDROCHLORIDE IN 0.9% SODIUM CHLORIDE 3.43 MICROGRAM(S)/KG/HR: 4 INJECTION INTRAVENOUS at 07:53

## 2019-01-01 RX ADMIN — Medication 5 MILLIGRAM(S): at 05:46

## 2019-01-01 RX ADMIN — Medication 100 MILLIGRAM(S): at 05:06

## 2019-01-01 RX ADMIN — OXYCODONE HYDROCHLORIDE 30 MILLIGRAM(S): 5 TABLET ORAL at 22:14

## 2019-01-01 RX ADMIN — Medication 5 MILLIGRAM(S): at 01:05

## 2019-01-01 RX ADMIN — Medication 325 MILLIGRAM(S): at 05:39

## 2019-01-01 RX ADMIN — Medication 1 APPLICATION(S): at 13:14

## 2019-01-01 RX ADMIN — CHLORHEXIDINE GLUCONATE 1 APPLICATION(S): 213 SOLUTION TOPICAL at 06:43

## 2019-01-01 RX ADMIN — MUPIROCIN 1 APPLICATION(S): 20 OINTMENT TOPICAL at 17:21

## 2019-01-01 RX ADMIN — OXYCODONE HYDROCHLORIDE 20 MILLIGRAM(S): 5 TABLET ORAL at 07:55

## 2019-01-01 RX ADMIN — Medication 0.5 MILLIGRAM(S): at 23:53

## 2019-01-01 RX ADMIN — OXYCODONE HYDROCHLORIDE 60 MILLIGRAM(S): 5 TABLET ORAL at 21:06

## 2019-01-01 RX ADMIN — OXYCODONE HYDROCHLORIDE 20 MILLIGRAM(S): 5 TABLET ORAL at 03:32

## 2019-01-01 RX ADMIN — Medication 100 MILLIGRAM(S): at 16:22

## 2019-01-01 RX ADMIN — MEROPENEM 100 MILLIGRAM(S): 1 INJECTION INTRAVENOUS at 17:45

## 2019-01-01 RX ADMIN — CHLORHEXIDINE GLUCONATE 1 APPLICATION(S): 213 SOLUTION TOPICAL at 06:16

## 2019-01-01 RX ADMIN — SODIUM CHLORIDE 250 MILLILITER(S): 9 INJECTION INTRAMUSCULAR; INTRAVENOUS; SUBCUTANEOUS at 16:55

## 2019-01-01 RX ADMIN — CEFEPIME 100 MILLIGRAM(S): 1 INJECTION, POWDER, FOR SOLUTION INTRAMUSCULAR; INTRAVENOUS at 01:59

## 2019-01-01 RX ADMIN — Medication 650 MILLIGRAM(S): at 05:06

## 2019-01-01 RX ADMIN — Medication 100 MILLIGRAM(S): at 05:35

## 2019-01-01 RX ADMIN — Medication 650 MILLIGRAM(S): at 06:40

## 2019-01-01 RX ADMIN — SODIUM CHLORIDE 150 MILLILITER(S): 9 INJECTION, SOLUTION INTRAVENOUS at 07:54

## 2019-01-01 RX ADMIN — Medication 650 MILLIGRAM(S): at 05:24

## 2019-01-01 RX ADMIN — HEPARIN SODIUM 5000 UNIT(S): 5000 INJECTION INTRAVENOUS; SUBCUTANEOUS at 05:31

## 2019-01-01 RX ADMIN — METHADONE HYDROCHLORIDE 80 MILLIGRAM(S): 40 TABLET ORAL at 11:50

## 2019-01-01 RX ADMIN — CEFTRIAXONE 100 MILLIGRAM(S): 500 INJECTION, POWDER, FOR SOLUTION INTRAMUSCULAR; INTRAVENOUS at 17:16

## 2019-01-01 RX ADMIN — Medication 100 MILLIGRAM(S): at 21:31

## 2019-01-01 RX ADMIN — Medication 5 MILLIGRAM(S): at 08:43

## 2019-01-01 RX ADMIN — OXYCODONE HYDROCHLORIDE 20 MILLIGRAM(S): 5 TABLET ORAL at 18:33

## 2019-01-01 RX ADMIN — SODIUM CHLORIDE 75 MILLILITER(S): 9 INJECTION, SOLUTION INTRAVENOUS at 11:17

## 2019-01-01 RX ADMIN — SODIUM CHLORIDE 75 MILLILITER(S): 9 INJECTION, SOLUTION INTRAVENOUS at 08:16

## 2019-01-01 RX ADMIN — OXYCODONE HYDROCHLORIDE 30 MILLIGRAM(S): 5 TABLET ORAL at 23:38

## 2019-01-01 RX ADMIN — CEFEPIME 100 MILLIGRAM(S): 1 INJECTION, POWDER, FOR SOLUTION INTRAMUSCULAR; INTRAVENOUS at 23:15

## 2019-01-01 RX ADMIN — Medication 2.5 MILLIGRAM(S): at 05:25

## 2019-01-01 RX ADMIN — OXYCODONE HYDROCHLORIDE 50 MILLIGRAM(S): 5 TABLET ORAL at 00:46

## 2019-01-01 RX ADMIN — CHLORHEXIDINE GLUCONATE 1 APPLICATION(S): 213 SOLUTION TOPICAL at 17:32

## 2019-01-01 RX ADMIN — MORPHINE SULFATE 2 MILLIGRAM(S): 50 CAPSULE, EXTENDED RELEASE ORAL at 23:00

## 2019-01-01 RX ADMIN — Medication 1 APPLICATION(S): at 17:33

## 2019-01-01 RX ADMIN — Medication 650 MILLIGRAM(S): at 05:28

## 2019-01-01 RX ADMIN — CEFEPIME 100 MILLIGRAM(S): 1 INJECTION, POWDER, FOR SOLUTION INTRAMUSCULAR; INTRAVENOUS at 22:06

## 2019-01-01 RX ADMIN — METHADONE HYDROCHLORIDE 40 MILLIGRAM(S): 40 TABLET ORAL at 05:15

## 2019-01-01 RX ADMIN — OXYCODONE HYDROCHLORIDE 60 MILLIGRAM(S): 5 TABLET ORAL at 12:30

## 2019-01-01 RX ADMIN — Medication 100 MILLIGRAM(S): at 11:06

## 2019-01-01 RX ADMIN — HEPARIN SODIUM 5000 UNIT(S): 5000 INJECTION INTRAVENOUS; SUBCUTANEOUS at 17:29

## 2019-01-01 RX ADMIN — SODIUM CHLORIDE 120 MILLILITER(S): 9 INJECTION, SOLUTION INTRAVENOUS at 13:13

## 2019-01-01 RX ADMIN — OXYCODONE HYDROCHLORIDE 30 MILLIGRAM(S): 5 TABLET ORAL at 09:53

## 2019-01-01 RX ADMIN — Medication 2000 UNIT(S): at 11:06

## 2019-01-01 RX ADMIN — HEPARIN SODIUM 5000 UNIT(S): 5000 INJECTION INTRAVENOUS; SUBCUTANEOUS at 21:26

## 2019-01-01 RX ADMIN — OXYCODONE HYDROCHLORIDE 20 MILLIGRAM(S): 5 TABLET ORAL at 23:00

## 2019-01-01 RX ADMIN — Medication 10 MILLIGRAM(S): at 05:28

## 2019-01-01 RX ADMIN — GABAPENTIN 200 MILLIGRAM(S): 400 CAPSULE ORAL at 11:08

## 2019-01-01 RX ADMIN — CHLORHEXIDINE GLUCONATE 1 APPLICATION(S): 213 SOLUTION TOPICAL at 06:42

## 2019-01-01 RX ADMIN — SODIUM POLYSTYRENE SULFONATE 30 GRAM(S): 4.1 POWDER, FOR SUSPENSION ORAL at 01:26

## 2019-01-01 RX ADMIN — SODIUM CHLORIDE 75 MILLILITER(S): 9 INJECTION, SOLUTION INTRAVENOUS at 05:12

## 2019-01-01 RX ADMIN — OXYCODONE HYDROCHLORIDE 30 MILLIGRAM(S): 5 TABLET ORAL at 20:05

## 2019-01-01 RX ADMIN — Medication 650 MILLIGRAM(S): at 22:16

## 2019-01-01 RX ADMIN — METHADONE HYDROCHLORIDE 80 MILLIGRAM(S): 40 TABLET ORAL at 05:57

## 2019-01-01 RX ADMIN — SODIUM CHLORIDE 75 MILLILITER(S): 9 INJECTION, SOLUTION INTRAVENOUS at 18:04

## 2019-01-01 RX ADMIN — OXYCODONE HYDROCHLORIDE 60 MILLIGRAM(S): 5 TABLET ORAL at 02:12

## 2019-01-01 RX ADMIN — SODIUM POLYSTYRENE SULFONATE 30 GRAM(S): 4.1 POWDER, FOR SUSPENSION ORAL at 08:21

## 2019-01-01 RX ADMIN — Medication 2.5 MILLIGRAM(S): at 17:39

## 2019-01-01 RX ADMIN — SODIUM CHLORIDE 75 MILLILITER(S): 9 INJECTION, SOLUTION INTRAVENOUS at 06:16

## 2019-01-01 RX ADMIN — MEROPENEM 100 MILLIGRAM(S): 1 INJECTION INTRAVENOUS at 06:23

## 2019-01-01 RX ADMIN — OXYCODONE HYDROCHLORIDE 60 MILLIGRAM(S): 5 TABLET ORAL at 13:03

## 2019-01-01 RX ADMIN — HEPARIN SODIUM 5000 UNIT(S): 5000 INJECTION INTRAVENOUS; SUBCUTANEOUS at 06:45

## 2019-01-01 RX ADMIN — SODIUM CHLORIDE 75 MILLILITER(S): 9 INJECTION, SOLUTION INTRAVENOUS at 05:28

## 2019-01-01 RX ADMIN — Medication 500 MILLIGRAM(S): at 11:30

## 2019-01-01 RX ADMIN — OXYCODONE HYDROCHLORIDE 20 MILLIGRAM(S): 5 TABLET ORAL at 00:05

## 2019-01-01 RX ADMIN — GABAPENTIN 200 MILLIGRAM(S): 400 CAPSULE ORAL at 12:14

## 2019-01-01 RX ADMIN — METHADONE HYDROCHLORIDE 80 MILLIGRAM(S): 40 TABLET ORAL at 05:52

## 2019-01-01 RX ADMIN — MORPHINE SULFATE 2 MILLIGRAM(S): 50 CAPSULE, EXTENDED RELEASE ORAL at 08:10

## 2019-01-01 RX ADMIN — Medication 1 TABLET(S): at 05:28

## 2019-01-01 RX ADMIN — PANTOPRAZOLE SODIUM 40 MILLIGRAM(S): 20 TABLET, DELAYED RELEASE ORAL at 17:20

## 2019-01-01 RX ADMIN — METHADONE HYDROCHLORIDE 40 MILLIGRAM(S): 40 TABLET ORAL at 21:19

## 2019-01-01 RX ADMIN — METHADONE HYDROCHLORIDE 10 MILLIGRAM(S): 40 TABLET ORAL at 12:46

## 2019-01-01 RX ADMIN — MORPHINE SULFATE 2 MILLIGRAM(S): 50 CAPSULE, EXTENDED RELEASE ORAL at 04:35

## 2019-01-01 RX ADMIN — Medication 250 MILLIGRAM(S): at 23:54

## 2019-01-01 RX ADMIN — Medication 100 MILLIGRAM(S): at 23:06

## 2019-01-01 RX ADMIN — METHADONE HYDROCHLORIDE 40 MILLIGRAM(S): 40 TABLET ORAL at 05:12

## 2019-01-01 RX ADMIN — HEPARIN SODIUM 5000 UNIT(S): 5000 INJECTION INTRAVENOUS; SUBCUTANEOUS at 05:05

## 2019-01-01 RX ADMIN — ONDANSETRON 4 MILLIGRAM(S): 8 TABLET, FILM COATED ORAL at 18:24

## 2019-01-01 RX ADMIN — Medication 50 MICROGRAM(S): at 05:35

## 2019-01-01 RX ADMIN — NYSTATIN CREAM 1 APPLICATION(S): 100000 CREAM TOPICAL at 17:20

## 2019-01-01 RX ADMIN — Medication 1 TABLET(S): at 13:13

## 2019-01-01 RX ADMIN — OXYCODONE HYDROCHLORIDE 30 MILLIGRAM(S): 5 TABLET ORAL at 06:49

## 2019-01-01 RX ADMIN — MORPHINE SULFATE 2 MILLIGRAM(S): 50 CAPSULE, EXTENDED RELEASE ORAL at 14:45

## 2019-01-01 RX ADMIN — CHLORHEXIDINE GLUCONATE 1 APPLICATION(S): 213 SOLUTION TOPICAL at 05:26

## 2019-01-01 RX ADMIN — Medication 133 MILLILITER(S): at 11:49

## 2019-01-01 RX ADMIN — HEPARIN SODIUM 5000 UNIT(S): 5000 INJECTION INTRAVENOUS; SUBCUTANEOUS at 05:26

## 2019-01-01 RX ADMIN — Medication 5 MILLIGRAM(S): at 16:06

## 2019-01-01 RX ADMIN — HEPARIN SODIUM 5000 UNIT(S): 5000 INJECTION INTRAVENOUS; SUBCUTANEOUS at 21:56

## 2019-01-01 RX ADMIN — METHADONE HYDROCHLORIDE 80 MILLIGRAM(S): 40 TABLET ORAL at 23:39

## 2019-01-01 RX ADMIN — HEPARIN SODIUM 5000 UNIT(S): 5000 INJECTION INTRAVENOUS; SUBCUTANEOUS at 17:03

## 2019-01-01 RX ADMIN — Medication 1000 UNIT(S): at 17:07

## 2019-01-01 RX ADMIN — METHADONE HYDROCHLORIDE 80 MILLIGRAM(S): 40 TABLET ORAL at 11:45

## 2019-01-01 RX ADMIN — Medication 1 APPLICATION(S): at 06:01

## 2019-01-01 RX ADMIN — METHADONE HYDROCHLORIDE 10 MILLIGRAM(S): 40 TABLET ORAL at 11:54

## 2019-01-01 RX ADMIN — Medication 650 MILLIGRAM(S): at 17:48

## 2019-01-01 RX ADMIN — Medication 667 MILLIGRAM(S): at 11:35

## 2019-01-01 RX ADMIN — Medication 650 MILLIGRAM(S): at 17:44

## 2019-01-01 RX ADMIN — PANTOPRAZOLE SODIUM 40 MILLIGRAM(S): 20 TABLET, DELAYED RELEASE ORAL at 17:02

## 2019-01-01 RX ADMIN — Medication 1 TABLET(S): at 11:14

## 2019-01-01 RX ADMIN — METHADONE HYDROCHLORIDE 80 MILLIGRAM(S): 40 TABLET ORAL at 05:43

## 2019-01-01 RX ADMIN — ONDANSETRON 4 MILLIGRAM(S): 8 TABLET, FILM COATED ORAL at 13:57

## 2019-01-01 RX ADMIN — MORPHINE SULFATE 2 MILLIGRAM(S): 50 CAPSULE, EXTENDED RELEASE ORAL at 08:27

## 2019-01-01 RX ADMIN — Medication 10 MILLIGRAM(S): at 05:39

## 2019-01-01 RX ADMIN — OXYCODONE HYDROCHLORIDE 50 MILLIGRAM(S): 5 TABLET ORAL at 00:11

## 2019-01-01 RX ADMIN — Medication 100 MILLIGRAM(S): at 14:19

## 2019-01-01 RX ADMIN — OXYCODONE HYDROCHLORIDE 60 MILLIGRAM(S): 5 TABLET ORAL at 02:02

## 2019-01-01 RX ADMIN — OXYCODONE HYDROCHLORIDE 60 MILLIGRAM(S): 5 TABLET ORAL at 11:12

## 2019-01-01 RX ADMIN — MORPHINE SULFATE 2 MILLIGRAM(S): 50 CAPSULE, EXTENDED RELEASE ORAL at 12:30

## 2019-01-01 RX ADMIN — Medication 10 MILLIGRAM(S): at 17:06

## 2019-01-01 RX ADMIN — HEPARIN SODIUM 5000 UNIT(S): 5000 INJECTION INTRAVENOUS; SUBCUTANEOUS at 06:20

## 2019-01-01 RX ADMIN — SODIUM CHLORIDE 150 MILLILITER(S): 9 INJECTION, SOLUTION INTRAVENOUS at 19:57

## 2019-01-01 RX ADMIN — OXYCODONE HYDROCHLORIDE 30 MILLIGRAM(S): 5 TABLET ORAL at 21:41

## 2019-01-01 RX ADMIN — PANTOPRAZOLE SODIUM 40 MILLIGRAM(S): 20 TABLET, DELAYED RELEASE ORAL at 17:21

## 2019-01-01 RX ADMIN — Medication 2 MILLIGRAM(S): at 21:35

## 2019-01-01 RX ADMIN — Medication 650 MILLIGRAM(S): at 17:10

## 2019-01-01 RX ADMIN — OXYCODONE HYDROCHLORIDE 60 MILLIGRAM(S): 5 TABLET ORAL at 08:12

## 2019-01-01 RX ADMIN — METHADONE HYDROCHLORIDE 80 MILLIGRAM(S): 40 TABLET ORAL at 13:08

## 2019-01-01 RX ADMIN — PANTOPRAZOLE SODIUM 40 MILLIGRAM(S): 20 TABLET, DELAYED RELEASE ORAL at 07:41

## 2019-01-01 RX ADMIN — HEPARIN SODIUM 5000 UNIT(S): 5000 INJECTION INTRAVENOUS; SUBCUTANEOUS at 14:19

## 2019-01-01 RX ADMIN — CHLORHEXIDINE GLUCONATE 1 APPLICATION(S): 213 SOLUTION TOPICAL at 05:56

## 2019-01-01 RX ADMIN — Medication 250 MILLIGRAM(S): at 16:15

## 2019-01-01 RX ADMIN — Medication 10 MILLIGRAM(S): at 05:03

## 2019-01-01 RX ADMIN — Medication 667 MILLIGRAM(S): at 12:36

## 2019-01-01 RX ADMIN — OXYCODONE HYDROCHLORIDE 50 MILLIGRAM(S): 5 TABLET ORAL at 23:47

## 2019-01-01 RX ADMIN — LIOTHYRONINE SODIUM 25 MICROGRAM(S): 25 TABLET ORAL at 06:27

## 2019-01-01 RX ADMIN — OXYCODONE HYDROCHLORIDE 40 MILLIGRAM(S): 5 TABLET ORAL at 20:35

## 2019-01-01 RX ADMIN — Medication 650 MILLIGRAM(S): at 13:08

## 2019-01-01 RX ADMIN — Medication 100 MILLIGRAM(S): at 11:34

## 2019-01-01 RX ADMIN — OXYCODONE AND ACETAMINOPHEN 1 TABLET(S): 5; 325 TABLET ORAL at 14:18

## 2019-01-01 RX ADMIN — Medication 5 MILLIGRAM(S): at 17:13

## 2019-01-01 RX ADMIN — PANTOPRAZOLE SODIUM 40 MILLIGRAM(S): 20 TABLET, DELAYED RELEASE ORAL at 17:13

## 2019-01-01 RX ADMIN — Medication 5 MILLIGRAM(S): at 00:51

## 2019-01-01 RX ADMIN — Medication 650 MILLIGRAM(S): at 21:55

## 2019-01-01 RX ADMIN — METHADONE HYDROCHLORIDE 80 MILLIGRAM(S): 40 TABLET ORAL at 06:30

## 2019-01-01 RX ADMIN — FLUDROCORTISONE ACETATE 0.1 MILLIGRAM(S): 0.1 TABLET ORAL at 05:21

## 2019-01-01 RX ADMIN — Medication 100 MICROGRAM(S): at 05:15

## 2019-01-01 RX ADMIN — Medication 500 MILLIGRAM(S): at 12:09

## 2019-01-01 RX ADMIN — MORPHINE SULFATE 2 MILLIGRAM(S): 50 CAPSULE, EXTENDED RELEASE ORAL at 17:46

## 2019-01-01 RX ADMIN — MORPHINE SULFATE 2 MILLIGRAM(S): 50 CAPSULE, EXTENDED RELEASE ORAL at 22:29

## 2019-01-01 RX ADMIN — MORPHINE SULFATE 2 MILLIGRAM(S): 50 CAPSULE, EXTENDED RELEASE ORAL at 18:16

## 2019-01-01 RX ADMIN — OXYCODONE HYDROCHLORIDE 50 MILLIGRAM(S): 5 TABLET ORAL at 06:30

## 2019-01-01 RX ADMIN — GABAPENTIN 200 MILLIGRAM(S): 400 CAPSULE ORAL at 12:55

## 2019-01-01 RX ADMIN — GABAPENTIN 200 MILLIGRAM(S): 400 CAPSULE ORAL at 11:13

## 2019-01-01 RX ADMIN — LIOTHYRONINE SODIUM 25 MICROGRAM(S): 25 TABLET ORAL at 05:04

## 2019-01-01 RX ADMIN — SODIUM CHLORIDE 75 MILLILITER(S): 9 INJECTION, SOLUTION INTRAVENOUS at 23:13

## 2019-01-01 RX ADMIN — HEPARIN SODIUM 5000 UNIT(S): 5000 INJECTION INTRAVENOUS; SUBCUTANEOUS at 05:53

## 2019-01-01 RX ADMIN — Medication 10 MILLIGRAM(S): at 06:17

## 2019-01-01 RX ADMIN — Medication 650 MILLIGRAM(S): at 05:32

## 2019-01-01 RX ADMIN — Medication 2 MILLIGRAM(S): at 21:52

## 2019-01-01 RX ADMIN — OXYCODONE HYDROCHLORIDE 60 MILLIGRAM(S): 5 TABLET ORAL at 17:40

## 2019-01-01 RX ADMIN — Medication 100 MILLIGRAM(S): at 06:04

## 2019-01-01 RX ADMIN — Medication 325 MILLIGRAM(S): at 17:09

## 2019-01-01 RX ADMIN — METHADONE HYDROCHLORIDE 80 MILLIGRAM(S): 40 TABLET ORAL at 06:12

## 2019-01-01 RX ADMIN — Medication 50 MILLIGRAM(S): at 12:24

## 2019-01-01 RX ADMIN — Medication 100 MILLIGRAM(S): at 14:40

## 2019-01-01 RX ADMIN — HEPARIN SODIUM 5000 UNIT(S): 5000 INJECTION INTRAVENOUS; SUBCUTANEOUS at 14:59

## 2019-01-01 RX ADMIN — CEFTRIAXONE 100 MILLIGRAM(S): 500 INJECTION, POWDER, FOR SOLUTION INTRAMUSCULAR; INTRAVENOUS at 18:47

## 2019-01-01 RX ADMIN — Medication 100 MICROGRAM(S): at 06:10

## 2019-01-01 RX ADMIN — Medication 1 TABLET(S): at 05:39

## 2019-01-01 RX ADMIN — Medication 10 MILLIGRAM(S): at 05:45

## 2019-01-01 RX ADMIN — Medication 100 MILLIGRAM(S): at 21:12

## 2019-01-01 RX ADMIN — Medication 10 MILLIGRAM(S): at 05:56

## 2019-01-01 RX ADMIN — Medication 1 TABLET(S): at 13:12

## 2019-01-01 RX ADMIN — Medication 5 MILLIGRAM(S): at 00:11

## 2019-01-01 RX ADMIN — ONDANSETRON 4 MILLIGRAM(S): 8 TABLET, FILM COATED ORAL at 00:00

## 2019-01-01 RX ADMIN — LIOTHYRONINE SODIUM 25 MICROGRAM(S): 25 TABLET ORAL at 06:01

## 2019-01-01 RX ADMIN — MORPHINE SULFATE 2 MILLIGRAM(S): 50 CAPSULE, EXTENDED RELEASE ORAL at 04:44

## 2019-01-01 RX ADMIN — METHADONE HYDROCHLORIDE 40 MILLIGRAM(S): 40 TABLET ORAL at 15:13

## 2019-01-01 RX ADMIN — MORPHINE SULFATE 2 MILLIGRAM(S): 50 CAPSULE, EXTENDED RELEASE ORAL at 12:29

## 2019-01-01 RX ADMIN — Medication 1 TABLET(S): at 21:35

## 2019-01-01 RX ADMIN — PANTOPRAZOLE SODIUM 40 MILLIGRAM(S): 20 TABLET, DELAYED RELEASE ORAL at 17:15

## 2019-01-01 RX ADMIN — Medication 5 MILLIGRAM(S): at 05:13

## 2019-01-01 RX ADMIN — Medication 667 MILLIGRAM(S): at 17:11

## 2019-01-01 RX ADMIN — Medication 1 APPLICATION(S): at 06:17

## 2019-01-01 RX ADMIN — Medication 100 MILLIGRAM(S): at 21:56

## 2019-01-01 RX ADMIN — HEPARIN SODIUM 5000 UNIT(S): 5000 INJECTION INTRAVENOUS; SUBCUTANEOUS at 13:08

## 2019-01-01 RX ADMIN — OXYCODONE HYDROCHLORIDE 20 MILLIGRAM(S): 5 TABLET ORAL at 15:30

## 2019-01-01 RX ADMIN — Medication 1 TABLET(S): at 22:21

## 2019-01-01 RX ADMIN — CHLORHEXIDINE GLUCONATE 1 APPLICATION(S): 213 SOLUTION TOPICAL at 06:08

## 2019-01-01 RX ADMIN — COSYNTROPIN 0.25 MILLIGRAM(S): 0.25 INJECTION, SOLUTION INTRAVENOUS at 08:24

## 2019-01-01 RX ADMIN — METHADONE HYDROCHLORIDE 80 MILLIGRAM(S): 40 TABLET ORAL at 00:33

## 2019-01-01 RX ADMIN — MORPHINE SULFATE 2 MILLIGRAM(S): 50 CAPSULE, EXTENDED RELEASE ORAL at 13:34

## 2019-01-01 RX ADMIN — METHADONE HYDROCHLORIDE 40 MILLIGRAM(S): 40 TABLET ORAL at 21:05

## 2019-01-01 RX ADMIN — Medication 10 MILLIGRAM(S): at 05:41

## 2019-01-01 RX ADMIN — Medication 100 MICROGRAM(S): at 05:47

## 2019-01-01 RX ADMIN — HEPARIN SODIUM 5000 UNIT(S): 5000 INJECTION INTRAVENOUS; SUBCUTANEOUS at 22:09

## 2019-01-01 RX ADMIN — Medication 10 MILLIGRAM(S): at 05:12

## 2019-01-01 RX ADMIN — Medication 650 MILLIGRAM(S): at 00:26

## 2019-01-01 RX ADMIN — GABAPENTIN 200 MILLIGRAM(S): 400 CAPSULE ORAL at 12:46

## 2019-01-01 RX ADMIN — HEPARIN SODIUM 5000 UNIT(S): 5000 INJECTION INTRAVENOUS; SUBCUTANEOUS at 22:17

## 2019-01-01 RX ADMIN — Medication 1000 MILLIGRAM(S): at 04:19

## 2019-01-01 RX ADMIN — Medication 50 MICROGRAM(S): at 05:20

## 2019-01-01 RX ADMIN — Medication 650 MILLIGRAM(S): at 11:14

## 2019-01-01 RX ADMIN — Medication 50 MILLIEQUIVALENT(S): at 20:21

## 2019-01-01 RX ADMIN — Medication 5 MILLIGRAM(S): at 00:57

## 2019-01-01 RX ADMIN — Medication 1 TABLET(S): at 05:35

## 2019-01-01 RX ADMIN — MUPIROCIN 1 APPLICATION(S): 20 OINTMENT TOPICAL at 05:44

## 2019-01-01 RX ADMIN — MUPIROCIN 1 APPLICATION(S): 20 OINTMENT TOPICAL at 05:26

## 2019-01-01 RX ADMIN — CHLORHEXIDINE GLUCONATE 1 APPLICATION(S): 213 SOLUTION TOPICAL at 05:11

## 2019-01-01 RX ADMIN — METHADONE HYDROCHLORIDE 80 MILLIGRAM(S): 40 TABLET ORAL at 13:00

## 2019-01-01 RX ADMIN — Medication 2.5 MILLIGRAM(S): at 05:15

## 2019-01-01 RX ADMIN — SODIUM CHLORIDE 125 MILLILITER(S): 9 INJECTION, SOLUTION INTRAVENOUS at 09:08

## 2019-01-01 RX ADMIN — PANTOPRAZOLE SODIUM 40 MILLIGRAM(S): 20 TABLET, DELAYED RELEASE ORAL at 05:36

## 2019-01-01 RX ADMIN — OXYCODONE HYDROCHLORIDE 20 MILLIGRAM(S): 5 TABLET ORAL at 03:45

## 2019-01-01 RX ADMIN — Medication 1 APPLICATION(S): at 11:02

## 2019-01-01 RX ADMIN — NALOXONE HYDROCHLORIDE 0.4 MILLIGRAM(S): 4 SPRAY NASAL at 14:43

## 2019-01-01 RX ADMIN — SEVELAMER CARBONATE 1600 MILLIGRAM(S): 2400 POWDER, FOR SUSPENSION ORAL at 11:32

## 2019-01-01 RX ADMIN — MUPIROCIN 1 APPLICATION(S): 20 OINTMENT TOPICAL at 17:39

## 2019-01-01 RX ADMIN — Medication 2 MILLIGRAM(S): at 15:24

## 2019-01-01 RX ADMIN — SODIUM CHLORIDE 1500 MILLILITER(S): 9 INJECTION INTRAMUSCULAR; INTRAVENOUS; SUBCUTANEOUS at 09:41

## 2019-01-01 RX ADMIN — Medication 650 MILLIGRAM(S): at 11:32

## 2019-01-01 RX ADMIN — Medication 5 MILLIGRAM(S): at 10:15

## 2019-01-01 RX ADMIN — Medication 650 MILLIGRAM(S): at 05:46

## 2019-01-01 RX ADMIN — METHADONE HYDROCHLORIDE 80 MILLIGRAM(S): 40 TABLET ORAL at 00:05

## 2019-01-01 RX ADMIN — CEFTRIAXONE 100 MILLIGRAM(S): 500 INJECTION, POWDER, FOR SOLUTION INTRAMUSCULAR; INTRAVENOUS at 17:27

## 2019-01-01 RX ADMIN — PANTOPRAZOLE SODIUM 40 MILLIGRAM(S): 20 TABLET, DELAYED RELEASE ORAL at 12:42

## 2019-01-01 RX ADMIN — HEPARIN SODIUM 5000 UNIT(S): 5000 INJECTION INTRAVENOUS; SUBCUTANEOUS at 22:00

## 2019-01-01 RX ADMIN — METHADONE HYDROCHLORIDE 80 MILLIGRAM(S): 40 TABLET ORAL at 13:37

## 2019-01-01 RX ADMIN — METHADONE HYDROCHLORIDE 80 MILLIGRAM(S): 40 TABLET ORAL at 11:48

## 2019-01-01 RX ADMIN — OXYCODONE HYDROCHLORIDE 50 MILLIGRAM(S): 5 TABLET ORAL at 08:18

## 2019-01-01 RX ADMIN — METHADONE HYDROCHLORIDE 80 MILLIGRAM(S): 40 TABLET ORAL at 18:35

## 2019-01-01 RX ADMIN — CHLORHEXIDINE GLUCONATE 1 APPLICATION(S): 213 SOLUTION TOPICAL at 17:28

## 2019-01-01 RX ADMIN — Medication 650 MILLIGRAM(S): at 23:24

## 2019-01-01 RX ADMIN — Medication 650 MILLIGRAM(S): at 21:05

## 2019-01-01 RX ADMIN — CEFTRIAXONE 100 GRAM(S): 500 INJECTION, POWDER, FOR SOLUTION INTRAMUSCULAR; INTRAVENOUS at 17:07

## 2019-01-01 RX ADMIN — Medication 1 APPLICATION(S): at 09:46

## 2019-01-01 RX ADMIN — Medication 667 MILLIGRAM(S): at 11:42

## 2019-01-01 RX ADMIN — SODIUM CHLORIDE 1000 MILLILITER(S): 9 INJECTION, SOLUTION INTRAVENOUS at 21:26

## 2019-01-01 RX ADMIN — OXYCODONE HYDROCHLORIDE 20 MILLIGRAM(S): 5 TABLET ORAL at 15:58

## 2019-01-01 RX ADMIN — OXYCODONE HYDROCHLORIDE 50 MILLIGRAM(S): 5 TABLET ORAL at 17:45

## 2019-01-01 RX ADMIN — LIOTHYRONINE SODIUM 25 MICROGRAM(S): 25 TABLET ORAL at 05:39

## 2019-01-01 RX ADMIN — MUPIROCIN 1 APPLICATION(S): 20 OINTMENT TOPICAL at 17:25

## 2019-01-01 RX ADMIN — Medication 2000 UNIT(S): at 13:31

## 2019-01-01 RX ADMIN — Medication 650 MILLIGRAM(S): at 00:08

## 2019-01-01 RX ADMIN — Medication 667 MILLIGRAM(S): at 17:13

## 2019-01-01 RX ADMIN — Medication 500 MILLIGRAM(S): at 12:27

## 2019-01-01 RX ADMIN — HEPARIN SODIUM 5000 UNIT(S): 5000 INJECTION INTRAVENOUS; SUBCUTANEOUS at 05:11

## 2019-01-01 RX ADMIN — MORPHINE SULFATE 2 MILLIGRAM(S): 50 CAPSULE, EXTENDED RELEASE ORAL at 01:33

## 2019-01-01 RX ADMIN — Medication 100 MICROGRAM(S): at 05:13

## 2019-01-01 RX ADMIN — SEVELAMER CARBONATE 1600 MILLIGRAM(S): 2400 POWDER, FOR SUSPENSION ORAL at 16:34

## 2019-01-01 RX ADMIN — SODIUM CHLORIDE 1000 MILLILITER(S): 9 INJECTION INTRAMUSCULAR; INTRAVENOUS; SUBCUTANEOUS at 02:00

## 2019-01-01 RX ADMIN — METHADONE HYDROCHLORIDE 80 MILLIGRAM(S): 40 TABLET ORAL at 00:09

## 2019-01-01 RX ADMIN — Medication 100 MILLIGRAM(S): at 14:27

## 2019-01-01 RX ADMIN — Medication 1 TABLET(S): at 21:26

## 2019-01-01 RX ADMIN — Medication 2000 UNIT(S): at 12:11

## 2019-01-01 RX ADMIN — Medication 100 MILLIGRAM(S): at 15:24

## 2019-01-01 RX ADMIN — METHADONE HYDROCHLORIDE 80 MILLIGRAM(S): 40 TABLET ORAL at 05:13

## 2019-01-01 RX ADMIN — Medication 10 MILLIGRAM(S): at 05:34

## 2019-01-01 RX ADMIN — Medication 2 MILLIGRAM(S): at 21:50

## 2019-01-01 RX ADMIN — MORPHINE SULFATE 2 MILLIGRAM(S): 50 CAPSULE, EXTENDED RELEASE ORAL at 12:11

## 2019-01-01 RX ADMIN — SODIUM CHLORIDE 100 MILLILITER(S): 9 INJECTION INTRAMUSCULAR; INTRAVENOUS; SUBCUTANEOUS at 07:52

## 2019-01-01 RX ADMIN — Medication 650 MILLIGRAM(S): at 05:52

## 2019-01-01 RX ADMIN — Medication 1 APPLICATION(S): at 17:59

## 2019-01-01 RX ADMIN — Medication 100 MILLIGRAM(S): at 14:04

## 2019-01-01 RX ADMIN — LIOTHYRONINE SODIUM 25 MICROGRAM(S): 25 TABLET ORAL at 05:46

## 2019-01-01 RX ADMIN — Medication 650 MILLIGRAM(S): at 06:01

## 2019-01-01 RX ADMIN — Medication 50 GRAM(S): at 22:16

## 2019-01-01 RX ADMIN — METHADONE HYDROCHLORIDE 80 MILLIGRAM(S): 40 TABLET ORAL at 11:25

## 2019-01-01 RX ADMIN — Medication 10 MILLIGRAM(S): at 05:55

## 2019-01-01 RX ADMIN — Medication 5 MILLIGRAM(S): at 17:10

## 2019-01-01 RX ADMIN — OXYCODONE AND ACETAMINOPHEN 2 TABLET(S): 5; 325 TABLET ORAL at 14:44

## 2019-01-01 RX ADMIN — SEVELAMER CARBONATE 800 MILLIGRAM(S): 2400 POWDER, FOR SUSPENSION ORAL at 17:14

## 2019-01-01 RX ADMIN — HEPARIN SODIUM 5000 UNIT(S): 5000 INJECTION INTRAVENOUS; SUBCUTANEOUS at 13:09

## 2019-01-01 RX ADMIN — Medication 5 MILLIGRAM(S): at 05:26

## 2019-01-01 RX ADMIN — Medication 100 MILLIGRAM(S): at 21:29

## 2019-01-01 RX ADMIN — Medication 50 MILLIGRAM(S): at 12:06

## 2019-01-01 RX ADMIN — METHADONE HYDROCHLORIDE 80 MILLIGRAM(S): 40 TABLET ORAL at 21:30

## 2019-01-01 RX ADMIN — Medication 2 MILLIGRAM(S): at 23:05

## 2019-01-01 RX ADMIN — SODIUM CHLORIDE 75 MILLILITER(S): 9 INJECTION, SOLUTION INTRAVENOUS at 00:10

## 2019-01-01 RX ADMIN — Medication 650 MILLIGRAM(S): at 11:34

## 2019-01-01 RX ADMIN — Medication 1 MILLIGRAM(S): at 11:57

## 2019-01-01 RX ADMIN — Medication 650 MILLIGRAM(S): at 02:23

## 2019-01-01 RX ADMIN — Medication 650 MILLIGRAM(S): at 12:15

## 2019-01-01 RX ADMIN — Medication 100 MILLIGRAM(S): at 11:42

## 2019-01-01 RX ADMIN — Medication 1 TABLET(S): at 21:45

## 2019-01-01 RX ADMIN — OXYCODONE HYDROCHLORIDE 40 MILLIGRAM(S): 5 TABLET ORAL at 19:05

## 2019-01-01 RX ADMIN — HYDROMORPHONE HYDROCHLORIDE 0.5 MILLIGRAM(S): 2 INJECTION INTRAMUSCULAR; INTRAVENOUS; SUBCUTANEOUS at 13:35

## 2019-01-01 RX ADMIN — Medication 5 MILLIGRAM(S): at 23:40

## 2019-01-01 RX ADMIN — SODIUM CHLORIDE 1000 MILLILITER(S): 9 INJECTION INTRAMUSCULAR; INTRAVENOUS; SUBCUTANEOUS at 17:54

## 2019-01-01 RX ADMIN — HEPARIN SODIUM 5000 UNIT(S): 5000 INJECTION INTRAVENOUS; SUBCUTANEOUS at 23:01

## 2019-01-01 RX ADMIN — OXYCODONE HYDROCHLORIDE 20 MILLIGRAM(S): 5 TABLET ORAL at 02:34

## 2019-01-01 RX ADMIN — HEPARIN SODIUM 5000 UNIT(S): 5000 INJECTION INTRAVENOUS; SUBCUTANEOUS at 05:42

## 2019-01-01 RX ADMIN — Medication 5 MILLIGRAM(S): at 19:48

## 2019-01-01 RX ADMIN — Medication 100 MILLIGRAM(S): at 14:31

## 2019-01-01 RX ADMIN — METHADONE HYDROCHLORIDE 80 MILLIGRAM(S): 40 TABLET ORAL at 06:44

## 2019-01-01 RX ADMIN — METHADONE HYDROCHLORIDE 80 MILLIGRAM(S): 40 TABLET ORAL at 23:01

## 2019-01-01 RX ADMIN — Medication 2.5 MILLIGRAM(S): at 18:00

## 2019-01-01 RX ADMIN — METHADONE HYDROCHLORIDE 80 MILLIGRAM(S): 40 TABLET ORAL at 21:23

## 2019-01-01 RX ADMIN — METHADONE HYDROCHLORIDE 80 MILLIGRAM(S): 40 TABLET ORAL at 23:20

## 2019-01-01 RX ADMIN — Medication 10 MILLIGRAM(S): at 17:43

## 2019-01-01 RX ADMIN — METHADONE HYDROCHLORIDE 80 MILLIGRAM(S): 40 TABLET ORAL at 05:09

## 2019-01-01 RX ADMIN — Medication 100 MICROGRAM(S): at 05:18

## 2019-01-01 RX ADMIN — Medication 5 MILLIGRAM(S): at 11:54

## 2019-01-01 RX ADMIN — Medication 325 MILLIGRAM(S): at 05:45

## 2019-01-01 RX ADMIN — PANTOPRAZOLE SODIUM 40 MILLIGRAM(S): 20 TABLET, DELAYED RELEASE ORAL at 05:28

## 2019-01-01 RX ADMIN — GABAPENTIN 200 MILLIGRAM(S): 400 CAPSULE ORAL at 11:12

## 2019-01-01 RX ADMIN — METHADONE HYDROCHLORIDE 40 MILLIGRAM(S): 40 TABLET ORAL at 13:08

## 2019-01-01 RX ADMIN — Medication 100 MICROGRAM(S): at 05:20

## 2019-01-01 RX ADMIN — SODIUM POLYSTYRENE SULFONATE 15 GRAM(S): 4.1 POWDER, FOR SUSPENSION ORAL at 01:04

## 2019-01-01 RX ADMIN — METHADONE HYDROCHLORIDE 80 MILLIGRAM(S): 40 TABLET ORAL at 12:17

## 2019-01-01 RX ADMIN — HEPARIN SODIUM 5000 UNIT(S): 5000 INJECTION INTRAVENOUS; SUBCUTANEOUS at 06:25

## 2019-01-01 RX ADMIN — MORPHINE SULFATE 2 MILLIGRAM(S): 50 CAPSULE, EXTENDED RELEASE ORAL at 13:25

## 2019-01-01 RX ADMIN — Medication 25 GRAM(S): at 13:29

## 2019-01-01 RX ADMIN — HEPARIN SODIUM 5000 UNIT(S): 5000 INJECTION INTRAVENOUS; SUBCUTANEOUS at 22:14

## 2019-01-01 RX ADMIN — Medication 1000 MILLILITER(S): at 17:13

## 2019-01-01 RX ADMIN — SODIUM CHLORIDE 100 MILLILITER(S): 9 INJECTION INTRAMUSCULAR; INTRAVENOUS; SUBCUTANEOUS at 15:31

## 2019-01-01 RX ADMIN — Medication 650 MILLIGRAM(S): at 21:31

## 2019-01-01 RX ADMIN — HEPARIN SODIUM 5000 UNIT(S): 5000 INJECTION INTRAVENOUS; SUBCUTANEOUS at 13:04

## 2019-01-01 RX ADMIN — CEFTRIAXONE 100 MILLIGRAM(S): 500 INJECTION, POWDER, FOR SOLUTION INTRAMUSCULAR; INTRAVENOUS at 23:47

## 2019-01-01 RX ADMIN — Medication 10 MILLIGRAM(S): at 05:24

## 2019-01-01 RX ADMIN — Medication 1 TABLET(S): at 05:31

## 2019-01-01 RX ADMIN — Medication 50 GRAM(S): at 09:14

## 2019-01-01 RX ADMIN — Medication 5 MILLIGRAM(S): at 14:51

## 2019-01-01 RX ADMIN — Medication 250 MILLIGRAM(S): at 22:20

## 2019-01-01 RX ADMIN — SEVELAMER CARBONATE 800 MILLIGRAM(S): 2400 POWDER, FOR SUSPENSION ORAL at 09:38

## 2019-01-01 RX ADMIN — SODIUM CHLORIDE 75 MILLILITER(S): 9 INJECTION, SOLUTION INTRAVENOUS at 13:00

## 2019-01-01 RX ADMIN — SODIUM CHLORIDE 50 MILLILITER(S): 9 INJECTION, SOLUTION INTRAVENOUS at 06:00

## 2019-01-01 RX ADMIN — Medication 2.5 MILLIGRAM(S): at 05:55

## 2019-01-01 RX ADMIN — METHADONE HYDROCHLORIDE 40 MILLIGRAM(S): 40 TABLET ORAL at 05:31

## 2019-01-01 RX ADMIN — Medication 250 MILLILITER(S): at 11:30

## 2019-01-01 RX ADMIN — HEPARIN SODIUM 5000 UNIT(S): 5000 INJECTION INTRAVENOUS; SUBCUTANEOUS at 21:07

## 2019-01-01 RX ADMIN — Medication 650 MILLIGRAM(S): at 17:14

## 2019-01-01 RX ADMIN — SODIUM CHLORIDE 1500 MILLILITER(S): 9 INJECTION INTRAMUSCULAR; INTRAVENOUS; SUBCUTANEOUS at 16:10

## 2019-01-01 RX ADMIN — CHLORHEXIDINE GLUCONATE 1 APPLICATION(S): 213 SOLUTION TOPICAL at 05:19

## 2019-01-01 RX ADMIN — Medication 100 MILLIGRAM(S): at 13:14

## 2019-01-01 RX ADMIN — SODIUM CHLORIDE 75 MILLILITER(S): 9 INJECTION, SOLUTION INTRAVENOUS at 18:38

## 2019-01-01 RX ADMIN — Medication 667 MILLIGRAM(S): at 17:09

## 2019-01-01 RX ADMIN — Medication 1 MILLIGRAM(S): at 11:25

## 2019-01-01 RX ADMIN — MORPHINE SULFATE 2 MILLIGRAM(S): 50 CAPSULE, EXTENDED RELEASE ORAL at 04:59

## 2019-01-01 RX ADMIN — Medication 2 MILLIGRAM(S): at 22:09

## 2019-01-01 RX ADMIN — Medication 650 MILLIGRAM(S): at 06:45

## 2019-01-01 RX ADMIN — SEVELAMER CARBONATE 1600 MILLIGRAM(S): 2400 POWDER, FOR SUSPENSION ORAL at 17:34

## 2019-01-01 RX ADMIN — Medication 1 TABLET(S): at 13:27

## 2019-01-01 RX ADMIN — MUPIROCIN 1 APPLICATION(S): 20 OINTMENT TOPICAL at 05:40

## 2019-01-01 RX ADMIN — Medication 100 MILLIGRAM(S): at 22:41

## 2019-01-01 RX ADMIN — Medication 1 TABLET(S): at 21:29

## 2019-01-01 RX ADMIN — Medication 25 MILLIGRAM(S): at 21:10

## 2019-01-01 RX ADMIN — AMLODIPINE BESYLATE 5 MILLIGRAM(S): 2.5 TABLET ORAL at 11:15

## 2019-01-01 RX ADMIN — Medication 1 APPLICATION(S): at 14:20

## 2019-01-01 RX ADMIN — SODIUM CHLORIDE 50 MILLILITER(S): 9 INJECTION, SOLUTION INTRAVENOUS at 03:10

## 2019-01-01 RX ADMIN — METHADONE HYDROCHLORIDE 80 MILLIGRAM(S): 40 TABLET ORAL at 11:35

## 2019-01-01 RX ADMIN — OXYCODONE HYDROCHLORIDE 20 MILLIGRAM(S): 5 TABLET ORAL at 07:25

## 2019-01-01 RX ADMIN — MORPHINE SULFATE 2 MILLIGRAM(S): 50 CAPSULE, EXTENDED RELEASE ORAL at 05:53

## 2019-01-01 RX ADMIN — METHADONE HYDROCHLORIDE 80 MILLIGRAM(S): 40 TABLET ORAL at 05:40

## 2019-01-01 RX ADMIN — MORPHINE SULFATE 2 MILLIGRAM(S): 50 CAPSULE, EXTENDED RELEASE ORAL at 05:58

## 2019-01-01 RX ADMIN — METHADONE HYDROCHLORIDE 80 MILLIGRAM(S): 40 TABLET ORAL at 00:28

## 2019-01-01 RX ADMIN — Medication 25 GRAM(S): at 14:42

## 2019-01-01 RX ADMIN — Medication 50 GRAM(S): at 04:17

## 2019-01-01 RX ADMIN — MORPHINE SULFATE 2 MILLIGRAM(S): 50 CAPSULE, EXTENDED RELEASE ORAL at 03:30

## 2019-01-01 RX ADMIN — METHADONE HYDROCHLORIDE 80 MILLIGRAM(S): 40 TABLET ORAL at 23:46

## 2019-01-01 RX ADMIN — Medication 650 MILLIGRAM(S): at 21:04

## 2019-01-01 RX ADMIN — Medication 100 MILLIGRAM(S): at 13:59

## 2019-01-01 RX ADMIN — HEPARIN SODIUM 5000 UNIT(S): 5000 INJECTION INTRAVENOUS; SUBCUTANEOUS at 14:42

## 2019-01-01 RX ADMIN — Medication 650 MILLIGRAM(S): at 17:20

## 2019-01-01 RX ADMIN — OXYCODONE HYDROCHLORIDE 20 MILLIGRAM(S): 5 TABLET ORAL at 20:00

## 2019-01-01 RX ADMIN — HYDROMORPHONE HYDROCHLORIDE 0.5 MILLIGRAM(S): 2 INJECTION INTRAMUSCULAR; INTRAVENOUS; SUBCUTANEOUS at 13:54

## 2019-01-01 RX ADMIN — OXYCODONE HYDROCHLORIDE 60 MILLIGRAM(S): 5 TABLET ORAL at 05:21

## 2019-01-01 RX ADMIN — CHLORHEXIDINE GLUCONATE 1 APPLICATION(S): 213 SOLUTION TOPICAL at 05:27

## 2019-01-01 RX ADMIN — METHADONE HYDROCHLORIDE 80 MILLIGRAM(S): 40 TABLET ORAL at 14:13

## 2019-01-01 RX ADMIN — CEFEPIME 100 MILLIGRAM(S): 1 INJECTION, POWDER, FOR SOLUTION INTRAMUSCULAR; INTRAVENOUS at 21:55

## 2019-01-01 RX ADMIN — CEFTRIAXONE 100 MILLIGRAM(S): 500 INJECTION, POWDER, FOR SOLUTION INTRAMUSCULAR; INTRAVENOUS at 22:11

## 2019-01-01 RX ADMIN — HEPARIN SODIUM 5000 UNIT(S): 5000 INJECTION INTRAVENOUS; SUBCUTANEOUS at 21:39

## 2019-01-01 RX ADMIN — Medication 100 MILLIGRAM(S): at 14:43

## 2019-01-01 RX ADMIN — Medication 10 MILLIGRAM(S): at 18:14

## 2019-01-01 RX ADMIN — OXYCODONE HYDROCHLORIDE 20 MILLIGRAM(S): 5 TABLET ORAL at 02:26

## 2019-01-01 RX ADMIN — Medication 650 MILLIGRAM(S): at 17:06

## 2019-01-01 RX ADMIN — SEVELAMER CARBONATE 800 MILLIGRAM(S): 2400 POWDER, FOR SUSPENSION ORAL at 12:42

## 2019-01-01 RX ADMIN — Medication 250 MILLIGRAM(S): at 22:21

## 2019-01-01 RX ADMIN — OXYCODONE HYDROCHLORIDE 60 MILLIGRAM(S): 5 TABLET ORAL at 03:28

## 2019-01-01 RX ADMIN — Medication 650 MILLIGRAM(S): at 23:31

## 2019-01-01 RX ADMIN — MORPHINE SULFATE 2 MILLIGRAM(S): 50 CAPSULE, EXTENDED RELEASE ORAL at 20:31

## 2019-01-01 RX ADMIN — Medication 650 MILLIGRAM(S): at 12:08

## 2019-01-01 RX ADMIN — PANTOPRAZOLE SODIUM 40 MILLIGRAM(S): 20 TABLET, DELAYED RELEASE ORAL at 05:33

## 2019-01-01 RX ADMIN — HEPARIN SODIUM 5000 UNIT(S): 5000 INJECTION INTRAVENOUS; SUBCUTANEOUS at 17:14

## 2019-01-01 RX ADMIN — HEPARIN SODIUM 5000 UNIT(S): 5000 INJECTION INTRAVENOUS; SUBCUTANEOUS at 06:43

## 2019-01-01 RX ADMIN — Medication 650 MILLIGRAM(S): at 05:34

## 2019-01-01 RX ADMIN — Medication 667 MILLIGRAM(S): at 08:29

## 2019-01-01 RX ADMIN — Medication 10 MILLIGRAM(S): at 13:30

## 2019-01-01 RX ADMIN — Medication 100 MICROGRAM(S): at 05:21

## 2019-01-01 RX ADMIN — Medication 1 APPLICATION(S): at 14:30

## 2019-01-01 RX ADMIN — Medication 650 MILLIGRAM(S): at 13:27

## 2019-01-01 RX ADMIN — HEPARIN SODIUM 5000 UNIT(S): 5000 INJECTION INTRAVENOUS; SUBCUTANEOUS at 21:04

## 2019-01-01 RX ADMIN — Medication 1 MILLIGRAM(S): at 11:30

## 2019-01-01 RX ADMIN — Medication 650 MILLIGRAM(S): at 17:07

## 2019-01-01 RX ADMIN — OXYCODONE HYDROCHLORIDE 60 MILLIGRAM(S): 5 TABLET ORAL at 05:08

## 2019-01-01 RX ADMIN — OXYCODONE HYDROCHLORIDE 60 MILLIGRAM(S): 5 TABLET ORAL at 19:16

## 2019-01-01 RX ADMIN — Medication 1 APPLICATION(S): at 17:19

## 2019-01-01 RX ADMIN — MORPHINE SULFATE 2 MILLIGRAM(S): 50 CAPSULE, EXTENDED RELEASE ORAL at 03:01

## 2019-01-01 RX ADMIN — Medication 20 MILLIEQUIVALENT(S): at 10:26

## 2019-01-01 RX ADMIN — HEPARIN SODIUM 5000 UNIT(S): 5000 INJECTION INTRAVENOUS; SUBCUTANEOUS at 12:46

## 2019-01-01 RX ADMIN — METHADONE HYDROCHLORIDE 60 MILLIGRAM(S): 40 TABLET ORAL at 13:05

## 2019-01-01 RX ADMIN — METHADONE HYDROCHLORIDE 80 MILLIGRAM(S): 40 TABLET ORAL at 00:19

## 2019-01-01 RX ADMIN — Medication 25 MILLIGRAM(S): at 21:34

## 2019-01-01 RX ADMIN — HEPARIN SODIUM 5000 UNIT(S): 5000 INJECTION INTRAVENOUS; SUBCUTANEOUS at 05:30

## 2019-01-01 RX ADMIN — Medication 10 MILLIGRAM(S): at 17:54

## 2019-01-01 RX ADMIN — HEPARIN SODIUM 5000 UNIT(S): 5000 INJECTION INTRAVENOUS; SUBCUTANEOUS at 21:41

## 2019-01-01 RX ADMIN — Medication 2.5 MILLIGRAM(S): at 12:28

## 2019-01-01 RX ADMIN — Medication 650 MILLIGRAM(S): at 13:54

## 2019-01-01 RX ADMIN — SODIUM CHLORIDE 75 MILLILITER(S): 9 INJECTION, SOLUTION INTRAVENOUS at 10:07

## 2019-01-01 RX ADMIN — ERGOCALCIFEROL 50000 UNIT(S): 1.25 CAPSULE ORAL at 12:43

## 2019-01-01 RX ADMIN — SODIUM CHLORIDE 50 MILLILITER(S): 9 INJECTION, SOLUTION INTRAVENOUS at 12:07

## 2019-01-01 RX ADMIN — CHLORHEXIDINE GLUCONATE 1 APPLICATION(S): 213 SOLUTION TOPICAL at 05:04

## 2019-01-01 RX ADMIN — METHADONE HYDROCHLORIDE 80 MILLIGRAM(S): 40 TABLET ORAL at 12:08

## 2019-01-01 RX ADMIN — SODIUM CHLORIDE 75 MILLILITER(S): 9 INJECTION, SOLUTION INTRAVENOUS at 14:15

## 2019-01-01 RX ADMIN — Medication 667 MILLIGRAM(S): at 11:31

## 2019-01-01 RX ADMIN — PANTOPRAZOLE SODIUM 40 MILLIGRAM(S): 20 TABLET, DELAYED RELEASE ORAL at 05:57

## 2019-01-01 RX ADMIN — CEFEPIME 100 MILLIGRAM(S): 1 INJECTION, POWDER, FOR SOLUTION INTRAMUSCULAR; INTRAVENOUS at 00:01

## 2019-01-01 RX ADMIN — GABAPENTIN 200 MILLIGRAM(S): 400 CAPSULE ORAL at 11:01

## 2019-01-01 RX ADMIN — SODIUM CHLORIDE 75 MILLILITER(S): 9 INJECTION, SOLUTION INTRAVENOUS at 17:55

## 2019-01-01 RX ADMIN — MUPIROCIN 1 APPLICATION(S): 20 OINTMENT TOPICAL at 05:39

## 2019-01-01 RX ADMIN — Medication 2.5 MILLIGRAM(S): at 06:26

## 2019-01-01 RX ADMIN — DEXMEDETOMIDINE HYDROCHLORIDE IN 0.9% SODIUM CHLORIDE 3.43 MICROGRAM(S)/KG/HR: 4 INJECTION INTRAVENOUS at 08:31

## 2019-01-01 RX ADMIN — GABAPENTIN 200 MILLIGRAM(S): 400 CAPSULE ORAL at 12:30

## 2019-01-01 RX ADMIN — MORPHINE SULFATE 2 MILLIGRAM(S): 50 CAPSULE, EXTENDED RELEASE ORAL at 18:26

## 2019-01-01 RX ADMIN — SODIUM CHLORIDE 75 MILLILITER(S): 9 INJECTION, SOLUTION INTRAVENOUS at 21:05

## 2019-01-01 RX ADMIN — CHLORHEXIDINE GLUCONATE 1 APPLICATION(S): 213 SOLUTION TOPICAL at 05:45

## 2019-01-01 RX ADMIN — HEPARIN SODIUM 5000 UNIT(S): 5000 INJECTION INTRAVENOUS; SUBCUTANEOUS at 22:24

## 2019-01-01 RX ADMIN — OXYCODONE HYDROCHLORIDE 50 MILLIGRAM(S): 5 TABLET ORAL at 18:24

## 2019-01-01 RX ADMIN — SODIUM CHLORIDE 75 MILLILITER(S): 9 INJECTION, SOLUTION INTRAVENOUS at 13:30

## 2019-01-01 RX ADMIN — MORPHINE SULFATE 2 MILLIGRAM(S): 50 CAPSULE, EXTENDED RELEASE ORAL at 22:38

## 2019-01-01 RX ADMIN — Medication 5 MILLIGRAM(S): at 13:18

## 2019-01-01 RX ADMIN — Medication 325 MILLIGRAM(S): at 17:02

## 2019-01-01 RX ADMIN — Medication 10 MILLIGRAM(S): at 06:45

## 2019-01-01 RX ADMIN — MORPHINE SULFATE 2 MILLIGRAM(S): 50 CAPSULE, EXTENDED RELEASE ORAL at 03:41

## 2019-01-01 RX ADMIN — INSULIN HUMAN 10 UNIT(S): 100 INJECTION, SOLUTION SUBCUTANEOUS at 16:09

## 2019-01-01 RX ADMIN — HEPARIN SODIUM 5000 UNIT(S): 5000 INJECTION INTRAVENOUS; SUBCUTANEOUS at 05:54

## 2019-01-01 RX ADMIN — Medication 5 MILLIGRAM(S): at 17:21

## 2019-01-01 RX ADMIN — SODIUM CHLORIDE 75 MILLILITER(S): 9 INJECTION, SOLUTION INTRAVENOUS at 10:38

## 2019-01-01 RX ADMIN — OXYCODONE HYDROCHLORIDE 50 MILLIGRAM(S): 5 TABLET ORAL at 10:32

## 2019-01-01 RX ADMIN — CHLORHEXIDINE GLUCONATE 1 APPLICATION(S): 213 SOLUTION TOPICAL at 05:20

## 2019-01-01 RX ADMIN — LIOTHYRONINE SODIUM 25 MICROGRAM(S): 25 TABLET ORAL at 05:42

## 2019-01-01 RX ADMIN — MEROPENEM 100 MILLIGRAM(S): 1 INJECTION INTRAVENOUS at 06:56

## 2019-01-01 RX ADMIN — HEPARIN SODIUM 5000 UNIT(S): 5000 INJECTION INTRAVENOUS; SUBCUTANEOUS at 14:07

## 2019-01-01 RX ADMIN — SODIUM CHLORIDE 125 MILLILITER(S): 9 INJECTION, SOLUTION INTRAVENOUS at 21:05

## 2019-01-01 RX ADMIN — OXYCODONE HYDROCHLORIDE 20 MILLIGRAM(S): 5 TABLET ORAL at 05:53

## 2019-01-01 RX ADMIN — Medication 650 MILLIGRAM(S): at 05:33

## 2019-01-01 RX ADMIN — Medication 10 MILLIGRAM(S): at 18:34

## 2019-01-01 RX ADMIN — METHADONE HYDROCHLORIDE 80 MILLIGRAM(S): 40 TABLET ORAL at 05:20

## 2019-01-01 RX ADMIN — Medication 100 MILLIGRAM(S): at 14:34

## 2019-01-01 RX ADMIN — Medication 650 MILLIGRAM(S): at 21:07

## 2019-01-01 RX ADMIN — OXYCODONE HYDROCHLORIDE 20 MILLIGRAM(S): 5 TABLET ORAL at 10:48

## 2019-01-01 RX ADMIN — GABAPENTIN 200 MILLIGRAM(S): 400 CAPSULE ORAL at 11:17

## 2019-01-01 RX ADMIN — OXYCODONE HYDROCHLORIDE 20 MILLIGRAM(S): 5 TABLET ORAL at 17:19

## 2019-01-01 RX ADMIN — MORPHINE SULFATE 2 MILLIGRAM(S): 50 CAPSULE, EXTENDED RELEASE ORAL at 01:00

## 2019-01-01 RX ADMIN — Medication 10 MILLIGRAM(S): at 18:15

## 2019-01-01 RX ADMIN — Medication 5 MILLIGRAM(S): at 12:20

## 2019-01-01 RX ADMIN — NYSTATIN CREAM 1 APPLICATION(S): 100000 CREAM TOPICAL at 06:01

## 2019-01-01 RX ADMIN — SODIUM CHLORIDE 75 MILLILITER(S): 9 INJECTION, SOLUTION INTRAVENOUS at 08:06

## 2019-01-01 RX ADMIN — Medication 5 MILLIGRAM(S): at 22:53

## 2019-01-01 RX ADMIN — CHLORHEXIDINE GLUCONATE 1 APPLICATION(S): 213 SOLUTION TOPICAL at 06:55

## 2019-01-01 RX ADMIN — SODIUM CHLORIDE 75 MILLILITER(S): 9 INJECTION, SOLUTION INTRAVENOUS at 12:14

## 2019-01-01 RX ADMIN — Medication 4000 UNIT(S): at 11:24

## 2019-01-01 RX ADMIN — PANTOPRAZOLE SODIUM 40 MILLIGRAM(S): 20 TABLET, DELAYED RELEASE ORAL at 05:27

## 2019-01-01 RX ADMIN — OXYCODONE HYDROCHLORIDE 60 MILLIGRAM(S): 5 TABLET ORAL at 06:10

## 2019-01-01 RX ADMIN — Medication 650 MILLIGRAM(S): at 11:33

## 2019-01-01 RX ADMIN — CEFTRIAXONE 100 MILLIGRAM(S): 500 INJECTION, POWDER, FOR SOLUTION INTRAMUSCULAR; INTRAVENOUS at 18:30

## 2019-01-01 RX ADMIN — Medication 100 MICROGRAM(S): at 05:22

## 2019-01-01 RX ADMIN — MORPHINE SULFATE 2 MILLIGRAM(S): 50 CAPSULE, EXTENDED RELEASE ORAL at 02:45

## 2019-01-01 RX ADMIN — OXYCODONE HYDROCHLORIDE 50 MILLIGRAM(S): 5 TABLET ORAL at 11:02

## 2019-01-01 RX ADMIN — METHADONE HYDROCHLORIDE 40 MILLIGRAM(S): 40 TABLET ORAL at 05:25

## 2019-01-01 RX ADMIN — Medication 667 MILLIGRAM(S): at 11:56

## 2019-01-01 RX ADMIN — Medication 1500 MILLILITER(S): at 19:00

## 2019-01-01 RX ADMIN — Medication 100 MICROGRAM(S): at 05:41

## 2019-01-01 RX ADMIN — SODIUM CHLORIDE 3000 MILLILITER(S): 9 INJECTION INTRAMUSCULAR; INTRAVENOUS; SUBCUTANEOUS at 06:30

## 2019-01-01 RX ADMIN — SODIUM CHLORIDE 500 MILLILITER(S): 9 INJECTION INTRAMUSCULAR; INTRAVENOUS; SUBCUTANEOUS at 15:38

## 2019-01-01 RX ADMIN — OXYCODONE HYDROCHLORIDE 30 MILLIGRAM(S): 5 TABLET ORAL at 16:42

## 2019-01-01 RX ADMIN — METHADONE HYDROCHLORIDE 80 MILLIGRAM(S): 40 TABLET ORAL at 23:02

## 2019-01-01 RX ADMIN — METHADONE HYDROCHLORIDE 40 MILLIGRAM(S): 40 TABLET ORAL at 21:06

## 2019-01-01 RX ADMIN — HEPARIN SODIUM 5000 UNIT(S): 5000 INJECTION INTRAVENOUS; SUBCUTANEOUS at 05:34

## 2019-01-01 RX ADMIN — MUPIROCIN 1 APPLICATION(S): 20 OINTMENT TOPICAL at 17:55

## 2019-01-01 RX ADMIN — Medication 2.5 MILLIGRAM(S): at 05:20

## 2019-01-01 RX ADMIN — Medication 50 MILLIEQUIVALENT(S): at 23:47

## 2019-01-01 RX ADMIN — OXYCODONE HYDROCHLORIDE 20 MILLIGRAM(S): 5 TABLET ORAL at 10:50

## 2019-01-01 RX ADMIN — OXYCODONE HYDROCHLORIDE 30 MILLIGRAM(S): 5 TABLET ORAL at 10:00

## 2019-01-01 RX ADMIN — OXYCODONE HYDROCHLORIDE 50 MILLIGRAM(S): 5 TABLET ORAL at 02:02

## 2019-01-01 RX ADMIN — OXYCODONE HYDROCHLORIDE 20 MILLIGRAM(S): 5 TABLET ORAL at 04:47

## 2019-01-01 RX ADMIN — SODIUM CHLORIDE 1000 MILLILITER(S): 9 INJECTION INTRAMUSCULAR; INTRAVENOUS; SUBCUTANEOUS at 18:54

## 2019-01-01 RX ADMIN — OXYCODONE HYDROCHLORIDE 50 MILLIGRAM(S): 5 TABLET ORAL at 07:48

## 2019-01-19 NOTE — PROGRESS NOTE ADULT - ATTENDING COMMENTS
-- Message is from the [employer]--    Provider paged via [address] - The below message was copied and pasted from a [employer] page:      Danya Christ called in and wants to change inhaler RX to a more affordable one for the patient, Patient is waiting at the Pharmacy for a return call patient insisted on paging [email or web address]
Patient seen and examined at the bed side.   not in distress.   Sacral decubitus ulcer present  Constipation and on bowel regimen now. continue to monitor clinically.   [facility] staff.
Patient seen and examined.   Patient still in grief losing his father recently   Target to resolve the constipation and abdominal pain.   UTI - is contamination and ID to make a comment on it.   once pain is better plan to d/c

## 2019-01-30 NOTE — PROGRESS NOTE ADULT - SUBJECTIVE AND OBJECTIVE BOX
MARGE BELLA  57y  Male      Patient is a 57y old  Male who presents with a chief complaint of Abdominal pain (27 Jul 2018 01:10)      INTERVAL HPI/OVERNIGHT EVENTS:      ******************************* REVIEW OF SYSTEMS:**********************************************      All other review of systems negative    *********************** VITALS ******************************************    T(F): 96.7 (07-30-18 @ 04:30)  HR: 93 (07-30-18 @ 04:30) (89 - 93)  BP: 117/73 (07-30-18 @ 04:30) (117/73 - 119/75)  RR: 18 (07-30-18 @ 04:30) (18 - 18)  SpO2: 96% (07-30-18 @ 07:45) (96% - 96%)    07-29-18 @ 07:01  -  07-30-18 @ 07:00  --------------------------------------------------------  IN: 400 mL / OUT: 300 mL / NET: 100 mL    07-30-18 @ 07:01  -  07-30-18 @ 17:19  --------------------------------------------------------  IN: 0 mL / OUT: 900 mL / NET: -900 mL            07-29-18 @ 07:01  -  07-30-18 @ 07:00  --------------------------------------------------------  IN: 400 mL / OUT: 300 mL / NET: 100 mL    07-30-18 @ 07:01  -  07-30-18 @ 17:19  --------------------------------------------------------  IN: 0 mL / OUT: 900 mL / NET: -900 mL        ******************************** PHYSICAL EXAM:**************************************************  GENERAL: NAD    PSYCH: no agitation, baseline mentation  HEENT:     NERVOUS SYSTEM:  Alert & Oriented X3, MS  3-4/5 B/L  UE and LE ; Sensory intact    PULMONARY: DESIRAE, CTA    CARDIOVASCULAR: S1S2 RRR    GI: Soft, NT, ND; BS present.    EXTREMITIES:  2+ Peripheral Pulses, No clubbing, cyanosis, or edema    LYMPH: No lymphadenopathy noted    SKIN: No rashes or lesions    ******************************************************************************************    Consultant(s) Notes Reviewed:  [x ] YES  [ ] NO    Discussed with Consultants/Other Providers [ x] YES     **************************** LABS *******************************************************                          9.2    8.84  )-----------( 439      ( 29 Jul 2018 10:51 )             29.9     07-29    138  |  99  |  12  ----------------------------<  78  3.9   |  26  |  0.7    Ca    8.2<L>      29 Jul 2018 10:51  Mg     2.2     07-30    TPro  5.5<L>  /  Alb  1.8<L>  /  TBili  0.2  /  DBili  x   /  AST  16  /  ALT  8   /  AlkPhos  117<H>  07-29        PT/INR - ( 30 Jul 2018 10:03 )   PT: 20.70 sec;   INR: 1.93 ratio           Lactate Trend  07-27 @ 11:17 Lactate:0.8   07-27 @ 10:47 Lactate:1.5   07-26 @ 14:52 Lactate:1.7         CAPILLARY BLOOD GLUCOSE  90 (30 Jul 2018 08:22)              **************************Active Medications *******************************************  sulfamethizole (Unknown)      ALPRAZolam 2 milliGRAM(s) Oral daily  diazepam    Tablet 10 milliGRAM(s) Oral two times a day  gabapentin 800 milliGRAM(s) Oral three times a day  methadone    Tablet 80 milliGRAM(s) Oral four times a day  oxybutynin 10 milliGRAM(s) Oral two times a day  oxyCODONE    IR 30 milliGRAM(s) Oral four times a day      ***************************************************  RADIOLOGY & ADDITIONAL TESTS:    Imaging Personally Reviewed:  [ ] YES  [ ] NO    HEALTH ISSUES - PROBLEM Dx: ? temporal arteritis MARGE BELLA  57y  Male      Patient is a 57y old  Male who presents with a chief complaint of Abdominal pain (27 Jul 2018 01:10)      INTERVAL HPI/OVERNIGHT EVENTS:      ******************************* REVIEW OF SYSTEMS:**********************************************      All other review of systems negative    *********************** VITALS ******************************************    T(F): 96.7 (07-30-18 @ 04:30)  HR: 93 (07-30-18 @ 04:30) (89 - 93)  BP: 117/73 (07-30-18 @ 04:30) (117/73 - 119/75)  RR: 18 (07-30-18 @ 04:30) (18 - 18)  SpO2: 96% (07-30-18 @ 07:45) (96% - 96%)    07-29-18 @ 07:01  -  07-30-18 @ 07:00  --------------------------------------------------------  IN: 400 mL / OUT: 300 mL / NET: 100 mL    07-30-18 @ 07:01  -  07-30-18 @ 17:19  --------------------------------------------------------  IN: 0 mL / OUT: 900 mL / NET: -900 mL            07-29-18 @ 07:01  -  07-30-18 @ 07:00  --------------------------------------------------------  IN: 400 mL / OUT: 300 mL / NET: 100 mL    07-30-18 @ 07:01  -  07-30-18 @ 17:19  --------------------------------------------------------  IN: 0 mL / OUT: 900 mL / NET: -900 mL        ******************************** PHYSICAL EXAM:**************************************************  GENERAL: NAD    PSYCH: no agitation, baseline mentation  HEENT:     NERVOUS SYSTEM:  Alert & Oriented X3, MS  3-4/5 B/L  UE and LE ; Sensory intact    PULMONARY: DESIRAE, CTA    CARDIOVASCULAR: S1S2 RRR    GI: Soft, NT, Distended; BS sluggish    EXTREMITIES:  2+ Peripheral Pulses, No clubbing, cyanosis, or edema    LYMPH: No lymphadenopathy noted    SKIN: No rashes or lesions    ******************************************************************************************    Consultant(s) Notes Reviewed:  [x ] YES  [ ] NO    Discussed with Consultants/Other Providers [ x] YES     **************************** LABS *******************************************************                          9.2    8.84  )-----------( 439      ( 29 Jul 2018 10:51 )             29.9     07-29    138  |  99  |  12  ----------------------------<  78  3.9   |  26  |  0.7    Ca    8.2<L>      29 Jul 2018 10:51  Mg     2.2     07-30    TPro  5.5<L>  /  Alb  1.8<L>  /  TBili  0.2  /  DBili  x   /  AST  16  /  ALT  8   /  AlkPhos  117<H>  07-29        PT/INR - ( 30 Jul 2018 10:03 )   PT: 20.70 sec;   INR: 1.93 ratio           Lactate Trend  07-27 @ 11:17 Lactate:0.8   07-27 @ 10:47 Lactate:1.5   07-26 @ 14:52 Lactate:1.7         CAPILLARY BLOOD GLUCOSE  90 (30 Jul 2018 08:22)              **************************Active Medications *******************************************  sulfamethizole (Unknown)      ALPRAZolam 2 milliGRAM(s) Oral daily  diazepam    Tablet 10 milliGRAM(s) Oral two times a day  gabapentin 800 milliGRAM(s) Oral three times a day  methadone    Tablet 80 milliGRAM(s) Oral four times a day  oxybutynin 10 milliGRAM(s) Oral two times a day  oxyCODONE    IR 30 milliGRAM(s) Oral four times a day      ***************************************************  RADIOLOGY & ADDITIONAL TESTS:    Imaging Personally Reviewed:  [ ] YES  [ ] NO    HEALTH ISSUES - PROBLEM Dx: temporal arteritis

## 2019-02-01 ENCOUNTER — OUTPATIENT (OUTPATIENT)
Dept: OUTPATIENT SERVICES | Facility: HOSPITAL | Age: 59
LOS: 1 days | End: 2019-02-01

## 2019-02-01 DIAGNOSIS — Z98.890 OTHER SPECIFIED POSTPROCEDURAL STATES: Chronic | ICD-10-CM

## 2019-02-01 DIAGNOSIS — Z89.421 ACQUIRED ABSENCE OF OTHER RIGHT TOE(S): Chronic | ICD-10-CM

## 2019-02-07 ENCOUNTER — OUTPATIENT (OUTPATIENT)
Dept: OUTPATIENT SERVICES | Facility: HOSPITAL | Age: 59
LOS: 1 days | Discharge: HOME | End: 2019-02-07

## 2019-02-07 ENCOUNTER — APPOINTMENT (OUTPATIENT)
Dept: WOUND CARE | Facility: CLINIC | Age: 59
End: 2019-02-07

## 2019-02-07 DIAGNOSIS — L89.154 PRESSURE ULCER OF SACRAL REGION, STAGE 4: ICD-10-CM

## 2019-02-07 DIAGNOSIS — L89.614 PRESSURE ULCER OF RIGHT HEEL, STAGE 4: ICD-10-CM

## 2019-02-07 DIAGNOSIS — L89.624 PRESSURE ULCER OF LEFT HEEL, STAGE 4: ICD-10-CM

## 2019-02-07 DIAGNOSIS — Z98.890 OTHER SPECIFIED POSTPROCEDURAL STATES: Chronic | ICD-10-CM

## 2019-02-07 DIAGNOSIS — Z89.421 ACQUIRED ABSENCE OF OTHER RIGHT TOE(S): Chronic | ICD-10-CM
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
## 2019-02-11 NOTE — PHYSICAL EXAM
[Healing] : healing [Size%: ______] : Size: [unfilled]% [Infected?] : Infected: No [0] : 0 out of 10 [Abnormal] : abnormal [Large] : medium [] : no [de-identified] : bilateral heels--> healed\par \par sacrum and ischia and buttocks--> wound healing-- . local wound care
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
## 2019-06-03 NOTE — CONSULT NOTE ADULT - SUBJECTIVE AND OBJECTIVE BOX
59 yo m hx htn, dm, s/p suprapubic cath, distant lumbar injury, diabetic foot ulcer seen by Dr. Gooden in past here for worsening of foot ulcer. Patient  increasing discoloration and foul smelling odor. Patient had been poor to follow up. States no  fever/chills/cp/sob/n/v. Patient is non compliant with doctor visits and antibiotics.        Past Medical History/ Surgical History:  Hypertension  Suprapubic catheter  Pressure ulcer  Diabetes  Lumbar compression fracture  S/P debridement  Toe amputation status, right  H/O hernia repair  No significant past surgical history      Allergies:sulfamethizole (Unknown)    Medications:docusate sodium 100 mg oral capsule: 1 cap(s) orally 2 times a day  gabapentin 800 mg oral tablet: 1 tab(s) orally 3 times a day  glucose 4 g oral tablet, chewable: 4 tab(s) orally once as needed  lactulose 10 g/15 mL oral syrup: 30 milliliter(s) orally once a day (at bedtime)  Levaquin 500 mg oral tablet: 1 tab(s) orally once a day   lisinopril 20 mg oral tablet: 1 tab(s) orally once a day  methadone: 80 milligram(s) orally 4 times a day  oxybutynin 10 mg/24 hr oral tablet, extended release: 1 tab(s) orally once a day  oxyCODONE 30 mg oral tablet: 1 tab(s) orally 4 times a day  pantoprazole 40 mg oral delayed release tablet: 1 tab(s) orally once a day (before a meal)  polyethylene glycol 3350 oral powder for reconstitution: 17 gram(s) orally 2 times a day, As needed, Constipation  senna oral tablet: 2 tab(s) orally once a day (at bedtime)  sodium hypochlorite 0.25% topical solution: 1 application topically every 12 hours  Testim 1% (50 mg/5 g) transdermal gel: 1 packet(s) transdermal once a day (in the morning)  Valium 10 mg oral tablet: orally 2 times a day  Xanax 2 mg oral tablet: orally once (at bedtime)  Zyvox 600 mg oral tablet: 1 tab(s) orally 2 times a day   calcium gluconate IVPB 1 Gram(s) IV Intermittent Once  lactated ringers. 1000 milliLiter(s) IV Continuous <Continuous>  sodium chloride 0.9%. 1000 milliLiter(s) IV Continuous <Continuous>  sodium polystyrene sulfonate Suspension 15 Gram(s) Oral Once  vancomycin  IVPB 1000 milliGRAM(s) IV Intermittent once      Physical Exam:  Vitals:T(C): 36.9 (06-03-19 @ 22:50), Max: 37.2 (06-03-19 @ 19:52)  HR: 84 (06-03-19 @ 22:50) (84 - 89)  BP: 96/58 (06-03-19 @ 22:50) (96/58 - 101/50)  RR: 18 (06-03-19 @ 22:50) (18 - 19)  SpO2: 97% (06-03-19 @ 22:50) (97% - 97%)    General: Alert and oriented times 3 , Not in acute distress   Heart: Regular rate and rhythm , no rubs murmurs or gallops  Lungs: Clear to auscultation , no wheezes , rales rhonci or adventicious breath sounds  Abdomen: Soft , positive bowel sounds, non tender, non distended, no peritoneal signs  Extemities:  warm, discolored erythematous no swelling , no edema, good motor and sensation positive pulses   R foot- discoloration/edema to R foot, ulceration to bone wet gangrene, first three toes removed                6.2    21.57 )-----------( 514      ( 06-03 @ 20:54 )             20.7                    134   |  104   |  72                 Ca: 8.0    BMP:   ----------------------------< 81     Mg: x     (06-03-19 @ 20:54)             5.9    |  14    | 4.9                Ph: x        LFT:     TPro: 5.9 / Alb: 1.6 / TBili: <0.2 / DBili: x / AST: 18 / ALT: 15 / AlkPhos: 235   (06-03-19 @ 20:54)          PT/INR - ( 03 Jun 2019 20:54 )   PT: 16.20 sec;   INR: 1.41 ratio         PTT - ( 03 Jun 2019 20:54 )  PTT:38.6 sec                          Assesment and Plan:  Patient is a 58y Male presenting with rle wet gangrene of right heel ulcer    Attending to see   arterial duplex  antibiotics  podiatry to see   medical admission  vascular will follow     GARTH Mann  06-03-19 @ 23:16  #[id number]/ [id number] 57 yo m hx htn, dm, s/p suprapubic cath, distant lumbar injury, diabetic foot ulcer seen by Dr. Gooden in past here for worsening of foot ulcer. Patient  increasing discoloration and foul smelling odor. Patient had been poor to follow up. States no  fever/chills/cp/sob/n/v. Patient is non compliant with doctor visits and antibiotics. He has had multiple stents placed by Dr Muñiz 2 years ago. Patient has been depressed since his father passed away two years ago and has been slowly declining in will to live and health.         Past Medical History/ Surgical History:  Hypertension  Suprapubic catheter  Pressure ulcer  Diabetes  Lumbar compression fracture  S/P debridement  Toe amputation status, right  H/O hernia repair  No significant past surgical history      Allergies:sulfamethizole (Unknown)    Medications:docusate sodium 100 mg oral capsule: 1 cap(s) orally 2 times a day  gabapentin 800 mg oral tablet: 1 tab(s) orally 3 times a day  glucose 4 g oral tablet, chewable: 4 tab(s) orally once as needed  lactulose 10 g/15 mL oral syrup: 30 milliliter(s) orally once a day (at bedtime)  Levaquin 500 mg oral tablet: 1 tab(s) orally once a day   lisinopril 20 mg oral tablet: 1 tab(s) orally once a day  methadone: 80 milligram(s) orally 4 times a day  oxybutynin 10 mg/24 hr oral tablet, extended release: 1 tab(s) orally once a day  oxyCODONE 30 mg oral tablet: 1 tab(s) orally 4 times a day  pantoprazole 40 mg oral delayed release tablet: 1 tab(s) orally once a day (before a meal)  polyethylene glycol 3350 oral powder for reconstitution: 17 gram(s) orally 2 times a day, As needed, Constipation  senna oral tablet: 2 tab(s) orally once a day (at bedtime)  sodium hypochlorite 0.25% topical solution: 1 application topically every 12 hours  Testim 1% (50 mg/5 g) transdermal gel: 1 packet(s) transdermal once a day (in the morning)  Valium 10 mg oral tablet: orally 2 times a day  Xanax 2 mg oral tablet: orally once (at bedtime)  Zyvox 600 mg oral tablet: 1 tab(s) orally 2 times a day   calcium gluconate IVPB 1 Gram(s) IV Intermittent Once  lactated ringers. 1000 milliLiter(s) IV Continuous <Continuous>  sodium chloride 0.9%. 1000 milliLiter(s) IV Continuous <Continuous>  sodium polystyrene sulfonate Suspension 15 Gram(s) Oral Once  vancomycin  IVPB 1000 milliGRAM(s) IV Intermittent once      Physical Exam:  Vitals:T(C): 36.9 (06-03-19 @ 22:50), Max: 37.2 (06-03-19 @ 19:52)  HR: 84 (06-03-19 @ 22:50) (84 - 89)  BP: 96/58 (06-03-19 @ 22:50) (96/58 - 101/50)  RR: 18 (06-03-19 @ 22:50) (18 - 19)  SpO2: 97% (06-03-19 @ 22:50) (97% - 97%)    General: Alert and oriented times 3 , Not in acute distress   Heart: Regular rate and rhythm , no rubs murmurs or gallops  Lungs: Clear to auscultation , no wheezes , rales rhonci or adventicious breath sounds  Abdomen: Soft , positive bowel sounds, non tender, non distended, no peritoneal signs  Extemities:  warm, discolored erythematous no swelling , no edema, good motor and sensation positive pulses   R foot- discoloration/edema to R foot, ulceration to bone wet gangrene, first three toes removed                6.2    21.57 )-----------( 514      ( 06-03 @ 20:54 )             20.7                    134   |  104   |  72                 Ca: 8.0    BMP:   ----------------------------< 81     Mg: x     (06-03-19 @ 20:54)             5.9    |  14    | 4.9                Ph: x        LFT:     TPro: 5.9 / Alb: 1.6 / TBili: <0.2 / DBili: x / AST: 18 / ALT: 15 / AlkPhos: 235   (06-03-19 @ 20:54)          PT/INR - ( 03 Jun 2019 20:54 )   PT: 16.20 sec;   INR: 1.41 ratio         PTT - ( 03 Jun 2019 20:54 )  PTT:38.6 sec                          Assesment and Plan:  Patient is a 58y Male presenting with rle wet gangrene of right heel ulcer, nec fasc    Attending to see   MICU admission nec fasc confirmed on xray, needs optimization  NPO IVF  Preop for the OR   Aggressive fluid hydration   Podiatry recommending BKA   Arterial duplex  Antibiotics iv broad spectrum  Monitor hemoglobin transfuse as needed       GARTH Mann  06-03-19 @ 23:16  #[id number]/ [id number]

## 2019-06-03 NOTE — ED PROVIDER NOTE - PROGRESS NOTE DETAILS
pt signed out to Dr. Wheat- pending podiatry/vascular/renal eval, transfusion ordered for anemia, abx/fluids ordered, admit attempted to page podiatry pager multiple times over the past hour and unable to reach the patient on call. contacted podiatry attending Dr Miller and will send resident to ED now 2/2 gas gangrene vascular surgery PA Mike aware of consultation reidg K - 5.2. will contact Renal for SILVINA D/w Renal Dr Raymundo Jones, recommend starting on Bicarb drip after bolus. Podiatry resident evaluated patient, recommend BKA. Vascular surgery PA is coming to evaluate patient. Spoke with ICU Fellow Dr Rush, patient has gas gangrene and will need surgery ASAP. should admit to SICU. Burn KATE Villa evaluated patient, recommend consultation with podiatry and vascular surgery spoke to Vascular Fellow Dr Atiya Sandoval, who feels pt would be BEST served given all his medical issues to be admitted to the MICU. Spoke to Medical ICU fellow Dr Rush, who will accept case to the MICU (under attending LE Carlos)

## 2019-06-03 NOTE — ED PROVIDER NOTE - OBJECTIVE STATEMENT
57 yo male hx of HTN/Chronic back pain (L1 burst fracture) with paraplegia since 1986 present c/o worsening right foot foul smelling draining wound over the past week. patient stated he has the wound to his buttocks and feet over the past year. He had vascular procedure last year with Dr Muñiz to open the vessels and the wound to his left foot has been healing well. He had been under the care with Dr Gooden for wound care and he had seen him last month. over past week. the wound has been draining foul smelling discharge and he feels and tired so he finally came to ED for evaluation. 59 yo male hx of HTN/Chronic back pain (L1 burst fracture) with paraplegia since 1986 present c/o worsening right foot foul smelling draining wound over the past week. patient stated he has the wound to his buttocks and feet over the past year. He had vascular procedure last year with Dr Muñiz to open the vessels and the wound to his left foot has been healing well. He had been under the care with Dr Gooden for wound care and he had seen him last month. over past week. the wound has been draining foul smelling discharge and he feels weak so he finally came to ED for evaluation. Reported hx of diabetes but resolved after 30lbs weight loss.

## 2019-06-03 NOTE — ED PROVIDER NOTE - CLINICAL SUMMARY MEDICAL DECISION MAKING FREE TEXT BOX
59 yo male with PMH of HTN, chronic back pain with paraplegia, presents to the ER for right foot foul smelling draining wound. Pt endorsed to me to follow up consults. Pt had slight elevation in K (no ekg changes) and low hgb (given PRBC). Pt seen by vascular team, podiatry, renal was called and pt admitted to MICU for medical management of pts medical issues, and surgery will monitor for post op issues as likely will need amputation. 57 yo male with PMH of HTN, chronic back pain with paraplegia, presents to the ER for right foot foul smelling draining wound that is gangrenous.  Pt endorsed to me to follow up consults. Pt had slight elevation in K (no ekg changes) and low hgb (given PRBC). Pt seen by vascular team, podiatry, renal was called and pt admitted to MICU for medical management of pts medical issues, and surgery will monitor for post op issues as likely will need amputation.

## 2019-06-03 NOTE — ED PROVIDER NOTE - PHYSICAL EXAMINATION
CONSTITUTIONAL: chronic ill-appearing; well-nourished; in no apparent distress.   EYES: PERRL; EOM intact. pale conjunctiva  CARDIOVASCULAR: Normal S1, S2; no murmurs, rubs, or gallops.   RESPIRATORY: Normal chest excursion with respiration; breath sounds clear and equal bilaterally; no wheezes, rhonchi, or rales.  GI/: Normal bowel sounds; non-distended; non-tender; no palpable organomegaly.   MS: small ulcer to sole of left foot and healing well. large ulcer with bone exposure to plantar aspect of right heel , multiple necrotic skin lesions noted to lateral and dorsum to right foot and large blood blister betweel the right 4th and 5th toe, previous toe amputation also noted. entire right foot is swollen/ecchymotic. + foul smelling purulent discharge noted.   SKIN: see MS exam. + small ulcer noted to left shin and right shin. healing without bleeding and dischargel. large stage 4 decubitus ulcer noted to sacrum and b/l buttocks.   NEURO/PSYCH: A & O x 4; grossly unremarkable.

## 2019-06-03 NOTE — ED PROVIDER NOTE - CARE PLAN
Principal Discharge DX:	Gas gangrene of foot  Secondary Diagnosis:	Sepsis  Secondary Diagnosis:	SILVINA (acute kidney injury)  Secondary Diagnosis:	Anemia Principal Discharge DX:	Gas gangrene of foot  Secondary Diagnosis:	Sepsis  Secondary Diagnosis:	SILVINA (acute kidney injury)  Secondary Diagnosis:	Anemia  Secondary Diagnosis:	Decubitus ulcer

## 2019-06-03 NOTE — ED ADULT NURSE NOTE - NSIMPLEMENTINTERV_GEN_ALL_ED
Implemented All Fall with Harm Risk Interventions:  [place] to call system. Call bell, personal items and telephone within reach. Instruct patient to call for assistance. Room bathroom lighting operational. Non-slip footwear when patient is off stretcher. Physically safe environment: no spills, clutter or unnecessary equipment. Stretcher in lowest position, wheels locked, appropriate side rails in place. Provide visual cue, wrist band, yellow gown, etc. Monitor gait and stability. Monitor for mental status changes and reorient to person, place, and time. Review medications for side effects contributing to fall risk. Reinforce activity limits and safety measures with patient and family. Provide visual clues: red socks.

## 2019-06-03 NOTE — ED PROVIDER NOTE - ATTENDING CONTRIBUTION TO CARE
57 yo m hx htn, dm, s/p suprapubic cath, distant lumbar injury, diabetic foot ulcer seen by Dr. Gooden in past here for worsening of foot ulcer. pt increasing discoloration and foul smelling odor. pt had been poor to follow up. no fever/chills/cp/sob/n/v.  pt supposed to be on abx but pt did not take it. pt states he has not f/u for months.     vss  gen- NAD, aaox3  card-rrr  lungs-ctab, no wheezing or rhonchi  abd-sntnd, no guarding or rebound  neuro- moving all extremities, no FND  R foot- discoloration/edema to R foot, ulceration to bone  skin- unstagable decub ulcer    c/f wet gangrene of R foot  will get basic labs, vbg, xr, burn/podiatry/vascular consult, abx, fluids  will likely need amputation

## 2019-06-03 NOTE — ED ADULT NURSE NOTE - SKIN TEMPERATURE MOISTURE
Understanding Middle Ear Infections in Children    Middle ear infections are most common in children under age 5. Crankiness, a fever, and tugging at or rubbing the ear may all be signs that your child has a middle ear infection. This is especially true if your child has a cold or other viral illness. It's important to call your healthcare provider if you see these or any of the signs listed below.  Call your child's healthcare provider if you notice any signs of a middle ear infection.   What are middle ear infections?  Middle ear infections occur behind the eardrum. The eardrum is the thin sheet of tissue that passes sound waves between the outer and middle ear. These infections are usually caused by bacteria or viruses. These are often related to a recent cold or allergy problem.  A blocked tube  In young children, these bacteria or viruses likely reach the middle ear by traveling the short length of the eustachian tube from the back of the nose. Once in the middle ear, they multiply and spread. This irritates delicate tissues lining the middle ear and eustachian tube. If the tube lining swells enough to block off the tube, air pressure drops in the middle ear. This pulls the eardrum inward, making it stiffer and less able to transmit sound.  Fluid buildup causes pain  Once the eustachian tube swells shut, moisture cant drain from the middle ear. Fluid that should flush out the infection builds up in the chamber. This may raise pressure behind the eardrum. This can decrease pain slightly. But if the infection spreads to this fluid, pressure behind the eardrum goes way up. The eardrum is forced outward. It becomes painful, and may break.  Chronic fluid affects hearing  If the eardrum doesnt break and the tube remains blocked, the fluid becomes an ongoing (chronic) condition. As the immediate (acute) infection passes, the middle ear fluid thickens. It becomes sticky and takes up less space. Pressure drops in  the middle ear once more. Inward suction stiffens the eardrum. This affects hearing. If the fluid is not removed, the eardrum may be stretched and damaged.  Signs of middle ear problems  · A fever over 100.4°F (38.0°C) and cold symptoms  · Severe ear pain  · Any kind of discharge from the ear  · Ear pain that gets worse or doesnt go away after a few days   When to call your child's healthcare provider  Call your child's healthcare provider's office if your otherwise healthy child has any of the signs or symptoms described below:  · Fever (see Fever and children, below)  · Your child has had a seizure caused by the fever  · Rapid breathing or shortness of breath  · A stiff neck or headache  · Trouble swallowing  · Your child acts ill after the fever is gone  · Persistent brown, green, or bloody mucus  · Signs of dehydration. These include severe thirst, dark yellow urine, infrequent urination, dull or sunken eyes, dry skin, and dry or cracked lips.  · Your child still doesn't look or act right to you, even after taking a non-aspirin pain reliever  Fever and children  Always use a digital thermometer to check your childs temperature. Never use a mercury thermometer.  For infants and toddlers, be sure to use a rectal thermometer correctly. A rectal thermometer may accidentally poke a hole in (perforate) the rectum. It may also pass on germs from the stool. Always follow the product makers directions for proper use. If you dont feel comfortable taking a rectal temperature, use another method. When you talk to your childs healthcare provider, tell him or her which method you used to take your childs temperature.  Here are guidelines for fever temperature. Ear temperatures arent accurate before 6 months of age. Dont take an oral temperature until your child is at least 4 years old.  Infant under 3 months old:  · Ask your childs healthcare provider how you should take the temperature.  · Rectal or forehead  (temporal artery) temperature of 100.4°F (38°C) or higher, or as directed by the provider  · Armpit temperature of 99°F (37.2°C) or higher, or as directed by the provider  Child age 3 to 36 months:  · Rectal, forehead (temporal artery), or ear temperature of 102°F (38.9°C) or higher, or as directed by the provider  · Armpit temperature of 101°F (38.3°C) or higher, or as directed by the provider  Child of any age:  · Repeated temperature of 104°F (40°C) or higher, or as directed by the provider  · Fever that lasts more than 24 hours in a child under 2 years old. Or a fever that lasts for 3 days in a child 2 years or older.   Date Last Reviewed: 11/1/2016 © 2000-2017 [employer]. [address] [place] [address]. All rights reserved. This information is not intended as a substitute for professional medical care. Always follow your healthcare professional's instructions.        Viral Respiratory Illness (Child)  Your child has a viral upper respiratory illness (URI), which is another term for the common cold. The virus is contagious during the first few days. It is spread through the air by coughing, sneezing or by direct contact (touching your sick child then touching your own eyes, nose or mouth). Frequent hand washing will decrease risk of spread. Most viral illnesses resolve within 7-14 days with rest and simple home remedies. However, they may sometimes last up to four weeks. Antibiotics will not kill a virus and are generally not prescribed for this condition.    Home care  · FLUIDS: Fever increases water loss from the body. For infants under 1 year old, continue regular formula or breast feedings. Between feedings give oral rehydration solution. (You can buy this as Pedialyte, Infalyte or Rehydralyte from grocery and drug stores. No prescription is needed.) For children over 1 year old, give plenty of fluids like water, juice, 7-Up, ginger-dorothy, lemonade or popsicles.  · EATING: If your  child doesn't want to eat solid foods, it's okay for a few days, as long as she/he drinks lots of fluid.  · REST: Keep children with fever at home resting or playing quietly until the fever is gone. Your child may return to day care or school when the fever is gone and she/he is eating well and feeling better.  · SLEEP: Periods of sleeplessness and irritability are common. A congested child will sleep best with the head and upper body propped up on pillows or with the head of the bed frame raised on a 6 inch block. An infant may sleep in a car-seat placed in the crib or in a baby swing.  · COUGH: Coughing is a normal part of this illness. A cool mist humidifier at the bedside may be helpful. Over-the-counter cough and cold medicines have not been proven to be any more helpful than a placebo (sweet syrup with no medicine in it). However, they can produce serious side effects, especially in infants under 2 years of age. Therefore, do not give over-the-counter cough and cold medicines to children under 6 years unless your doctor has specifically advised you to do so. Also, dont expose your child to cigarette smoke. It can make the cough worse.  · NASAL CONGESTION: Suction the nose of infants with a rubber bulb syringe. You may put 2-3 drops of saltwater (saline) nose drops in each nostril before suctioning to help remove secretions. Saline nose drops are available without a prescription or make by adding 1/4 teaspoon table salt in 1 cup of water.  · FEVER: Use Tylenol (acetaminophen) for fever, fussiness or discomfort, unless another medicine was prescribed. In infants over six months of age, you may use ibuprofen (Childrens Motrin) instead of Tylenol. [NOTE: If your child has chronic liver or kidney disease or has ever had a stomach ulcer or GI bleeding, talk with your doctor before using these medicines.] (Aspirin should never be used in anyone under 18 years of age who is ill with a fever. It may cause severe  "liver damage.)  · PREVENTING SPREAD: Washing your hands after touching your sick child will help prevent the spread of this viral illness to yourself and to other children.  Follow-up care  Follow up as directed by our staff.  When to seek medical advice  Call your child's health care provider right away if any of these occur:  · Fever of 100.4°F (38°C) oral or 101.4°F (38.5°C) rectal or higher, not better with fever medication  · Fast breathing (birth to 6 wks: over 60 breaths/min; 6 wk - 2 yr: over 45 breaths/min; 3-6 yr: over 35 breaths/min; 7-10 yrs: over 30 breaths/min; more than 10 yrs old: over 25 breaths/min)  · Increased wheezing or difficulty breathing  · Earache, sinus pain, stiff or painful neck, headache, repeated diarrhea or vomiting  · Unusual fussiness, drowsiness or confusion  · New rash appears  · No tears when crying; "sunken" eyes or dry mouth; no wet diapers for 8 hours in infants, reduced urine output in older children  © 4219-2921 [employer]. [address] [place] [address], [place] [address]. All rights reserved. This information is not intended as a substitute for professional medical care. Always follow your healthcare professional's instructions.    " cool

## 2019-06-03 NOTE — ED PROVIDER NOTE - NS ED ROS FT
Constitutional: no fever, chills, no recent weight loss, change in appetite or malaise  Eyes: no redness/discharge/pain/vision changes  ENT: no rhinorrhea/ear pain/sore throat  Cardiac: No chest pain, SOB or edema.  Respiratory: No cough or respiratory distress  GI: No nausea, vomiting, diarrhea or abdominal pain.  : No dysuria, frequency, urgency or hematuria  MS: see HPI  Neuro: No headache or weakness. No LOC.  Skin: see HPI  Endocrine: No history of thyroid disease or diabetes.  Except as documented in the HPI, all other systems are negative.

## 2019-06-03 NOTE — ED ADULT NURSE NOTE - ED STAT RN HANDOFF DETAILS
pt resting comfortably, denies any discomfort. no distress noted at this time. iv intact, safety maintained, on monitor. VSS. awaiting additional testing.  PRBC infusing.

## 2019-06-04 NOTE — CONSULT NOTE ADULT - SUBJECTIVE AND OBJECTIVE BOX
MARGE BELLA  58y, Male  Allergy: sulfamethizole (Unknown)      CHIEF COMPLAINT:     HPI:  58 M w/ LE weakness 2/2 lumbar compression fracture at the age of 24, resulting in paraplgia/ wheelchair bound (can move LE but very weak, and has some sensation), with subsequent sacral decubiti and heel ulcers s/p debridements in the past, PAD, suprapubic cath, Chronic pain, Neuropathy, Insomnia, DM2 & HTN that resolved w/ weight loss, is BIBA c/o foul smell & drainage from his RLE. Pt states that he noticed skin changes, discoloration, drainage, and a foul smell a couple of days ago on his right foot, he usually follow up with dr pradhan for sacrum wound but hasn't seen him for about 4 months. He denies fevers, chills, sob, cp, admits to decreased appetite.   In ED pt had VS stable/wnl/ afebrile.  Labs - Hgb 6.2. Cr 4.9, K 5.2 on VBG, Ph 7.2.  XRAY of Rt foot Prelim - gas gangrene.  Podiatry, Vascular, Burn, & Renal were called.  Pt given - > Zosyn / Vanc / Flagyl / 2L IVF / & 2 U PRBCs (04 Jun 2019 02:29)    Concern for necrotizing fascitis s/p OR and amputation    FAMILY HISTORY:    PAST MEDICAL & SURGICAL HISTORY:  Hypertension  Suprapubic catheter  Pressure ulcer  Diabetes  Lumbar compression fracture  S/P debridement  Toe amputation status, right  H/O hernia repair      SOCIAL HISTORY    Substance Use (  ) never used  (  ) IVDU ( x ) Other: marijuana, cbd  Tobacco Usage:  (   ) never smoked   (   x) former smoker   (   ) current smoker   Alcohol Usage: (   ) social  (   ) daily use (  x ) denies  Sexual History: na      ROS  General: Denies fevers, chills, nightsweats, weight loss  HEENT: Denies headache, rhinorrhea, sore throat, eye pain  CV: Denies CP, palpitations  PULM: Denies SOB, cough  GI: Denies abdominal pain, diarrhea  : Denies dysuria, hematuria  MSK: Denies arthralgias  SKIN: Denies rash  NEURO: Denies paresthesias, weakness  PSYCH: Denies depression    VITALS:  T(F): 97.5, Max: 98.9 (06-03-19 @ 19:52)  HR: 88  BP: 104/59  RR: 15Vital Signs Last 24 Hrs  T(C): 36.4 (04 Jun 2019 11:37), Max: 37.2 (03 Jun 2019 19:52)  T(F): 97.5 (04 Jun 2019 11:37), Max: 98.9 (03 Jun 2019 19:52)  HR: 88 (04 Jun 2019 14:00) (74 - 89)  BP: 104/59 (04 Jun 2019 14:00) (87/54 - 119/71)  BP(mean): 75 (04 Jun 2019 08:30) (67 - 86)  RR: 15 (04 Jun 2019 14:00) (9 - 26)  SpO2: 99% (04 Jun 2019 14:00) (97% - 100%)    PHYSICAL EXAM:  Gen: NAD, resting in bed  HEENT: Normocephalic, atraumatic  Neck: supple, no lymphadenopathy  CV: Regular rate & regular rhythm  Lungs: decreased BS at bases, no fremitus  Abdomen: Soft, BS present spc  Ext: Warm, well perfused, R amputation dressings   Neuro: non focal, awake  Skin: no rash, no erythema    TESTS & MEASUREMENTS:                        7.2    14.34 )-----------( 367      ( 04 Jun 2019 12:08 )             23.0     06-04    134<L>  |  104  |  69<HH>  ----------------------------<  87  4.7   |  16<L>  |  4.3<HH>    Ca    7.7<L>      04 Jun 2019 12:12  Phos  5.8     06-04  Mg     1.3     06-04    TPro  5.6<L>  /  Alb  1.7<L>  /  TBili  0.4  /  DBili  x   /  AST  15  /  ALT  14  /  AlkPhos  211<H>  06-04    eGFR if : 17 mL/min/1.73M2 (06-04-19 @ 12:12)  eGFR if Non African American: 14 mL/min/1.73M2 (06-04-19 @ 12:12)  eGFR if Non African American: 13 mL/min/1.73M2 (06-04-19 @ 05:01)  eGFR if African American: 15 mL/min/1.73M2 (06-04-19 @ 05:01)  eGFR if Non African American: 12 mL/min/1.73M2 (06-03-19 @ 20:54)  eGFR if : 14 mL/min/1.73M2 (06-03-19 @ 20:54)    LIVER FUNCTIONS - ( 04 Jun 2019 05:01 )  Alb: 1.7 g/dL / Pro: 5.6 g/dL / ALK PHOS: 211 U/L / ALT: 14 U/L / AST: 15 U/L / GGT: x                 Lactate, Blood: 0.7 mmol/L (06-04-19 @ 05:01)  Blood Gas Venous - Lactate: 1.4 mmoL/L (06-03-19 @ 22:34)  Lactate, Blood: 1.2 mmol/L (06-03-19 @ 20:54)      INFECTIOUS DISEASES TESTING      RADIOLOGY & ADDITIONAL TESTS:  I have personally reviewed the last Chest xray  CXR      CT      CARDIOLOGY TESTING  12 Lead ECG:   Ventricular Rate 91 BPM    Atrial Rate 91 BPM    P-R Interval 154 ms    QRS Duration 84 ms    Q-T Interval 348 ms    QTC Calculation(Bezet) 428 ms    P Axis 46 degrees    R Axis -14 degrees    T Axis 49 degrees    Diagnosis Line Normal sinus rhythm  Normal ECG    Confirmed by ELLIOT RIVERA MD ([id number]) on 6/3/2019 10:49:33 PM (06-03-19 @ 20:44)      MEDICATIONS  ALPRAZolam 2  chlorhexidine 4% Liquid 1  clindamycin IVPB 900  dextrose 5%. 1000  dextrose 50% Injectable 12.5  dextrose 50% Injectable 25  dextrose 50% Injectable 25  diazepam    Tablet 10  docusate sodium 100  heparin  Injectable 5000  insulin lispro (HumaLOG) corrective regimen sliding scale   magnesium sulfate  IVPB 2  meropenem  IVPB 500  methadone    Tablet 10  ondansetron Injectable 4  sodium chloride 0.9%. 1000      ANTIBIOTICS:  clindamycin IVPB 900 milliGRAM(s) IV Intermittent every 8 hours  meropenem  IVPB 500 milliGRAM(s) IV Intermittent every 12 hours

## 2019-06-04 NOTE — PRE-ANESTHESIA EVALUATION ADULT - NSANTHADDINFOFT_GEN_ALL_CORE
General anesthesia with need for blood products and invasive monitoring. discussed with the patient all the risks, benefits, alternatives, complications. all questions answered. willing to proceed

## 2019-06-04 NOTE — CONSULT NOTE ADULT - SUBJECTIVE AND OBJECTIVE BOX
pt seen this am and discussed with family, pt, and medical and vasc service    paraplegic pt with wet gangrene/ nec fasc right foot    PE: right foot--> edematous, wet gangrene, exposed heel bone, erythema

## 2019-06-04 NOTE — H&P ADULT - ASSESSMENT
58 M w/ PMH of Paraplegia 2/2 Lumbar compression Fx, sacral decubiti and heel ulcers s/p debridements,, PAD, suprapubic cath,Neuropathy, DM2 & HTN that resolved w/ weight loss, is BIBA c/o foul smell & drainage from his RLE, found to be anemic, in SILVINA, & gangrene of RLE.    # Likely Necrotizing Fasciitis of RLE  - Meropenem / Vancomycin / & Clindamycin  - Cultures were sent  - Vascular planning for BKA today, keep pt NPO  - Podiatry & Burn following  - ID consult    # SILVINA w/ Metabolic Acidosis (Cr 4.9, baseline 1.1 in 2018)  - Bicarb drip  - Repeat labs  - Renal US  - Urine Lytes  - Renal consult    # Microcytic Anemia  - s/p 2 U PRBCs  - CBC in am    # Urinary Retention - c/w SPC  # DM2 & HTN - pt says they resolved. Check A1c & Monitor BP.  # Sacral Decubiti - Burn following  # Chronic Pain - Methadone / oxycodone  # Insomnia - xanax / diazepam    # DVTppx: Start post surgery  # CHG  # Full Code  # ICU Monitoring for now 58 M w/ PMH of Paraplegia 2/2 Lumbar compression Fx, sacral decubiti and heel ulcers s/p debridements,, PAD, suprapubic cath,Neuropathy, DM2 & HTN that resolved w/ weight loss, is BIBA c/o foul smell & drainage from his RLE, found to be anemic, in SILVINA, & gangrene of RLE.    # Likely Necrotizing Fasciitis of RLE  - Meropenem / Vancomycin / & Clindamycin (Adjusted for Cr Clearance of 17)  - Cultures were sent  - Vascular planning for BKA today, keep pt NPO  - Podiatry & Burn following  - ID consult  - A duplex pending  - Official Xray result pending    # SILVINA w/ Metabolic Acidosis (Cr 4.9, baseline 1.1 in 2018)  - Bicarb drip  - Repeat labs  - Renal US  - Urine Lytes  - Renal consult    # Microcytic Anemia  - s/p 2 U PRBCs  - CBC in am    # Urinary Retention - c/w SPC  # DM2 & HTN - pt says they resolved. Check A1c & Monitor BP.  # Sacral Decubiti - Burn following  # Chronic Pain - Methadone / oxycodone  # Insomnia - xanax / diazepam    # DVTppx: Start post surgery  # CHG  # Full Code  # ICU Monitoring for now

## 2019-06-04 NOTE — CONSULT NOTE ADULT - ASSESSMENT
ASSESSMENT:  57y/o M, w/ extensive PMH as above, s/p R [place] BKA for R foot necrotizing fasciitis.    PLAN:     Neurologic: pain control, on Morphine prn and Oxycodone prn.   Home meds resumed - Methadone; anxiety: Xanax, Valium    Respiratory: on NC @ 3L, wean as tolerated; keep sat > 95%    Cardiovascular: Hypotensive to 100/60. Maintain normotensive  Troponin 0.08. F/U CE x 2.    Gastrointestinal/Nutrition: NPO. Will advance to clears, and AAT.  PPI. Bowel regimen.    Renal/Genitourinary: SILVINA with Cr 4.6 from 4.9, baseline unknown.  Bicarb drip.  Suprapubic catheter in place. Strict I&Os.    Hematologic: Hgb 7.2 from 8.1. Up from 6.2 after 2u prbc.  Trend hgb. Transfuse if hgb < 7 as per primary team.  HSQ.     Infectious Disease: WBC trending down at 14 from 16 from 21.   +ORSA  Abx: Meropenem, Clinda.  Sacral decubitus ulcer. Follow-up with burn (Dr. Gooden)    Musculoskeletal: R BKA, dressing in place, not saturated.   Pulse checks Q4h. On bedrest. Can be out of bed with assistance on POD#1.    Endocrine: DM, on ISS.    Lines/Tubes:   2 peripheral IVs in R forearm & R shoulder  Suprapubic catheter        Disposition:     --------------------------------------------------------------------------------------    Critical Care Diagnoses: ASSESSMENT:  57y/o M, w/ extensive PMH as above, s/p R [place]ottie BKA for R foot necrotizing fasciitis.    PLAN:     Neurologic: pain control, on Morphine prn and Oxycodone prn.   Home meds resumed - Methadone  Anxiety: Xanax, Valium    Respiratory: on NC @ 3L, wean as tolerated; keep sat > 95%  F/U post-op ABG.    Cardiovascular: Hypotensive to 100/60. Maintain normotensive  Troponin 0.08. F/U CE x 2, bloodwork, ABG.    Gastrointestinal/Nutrition: NPO. Will advance to clears, and AAT.  PPI. Bowel regimen.    Renal/Genitourinary: SILVINA with Cr 4.6 from 4.9, baseline unknown. F/U nephro recommendations.  Bicarb drip for acidosis. F/U ABG.  Suprapubic catheter in place. Strict I&Os.    Hematologic: Hgb 7.2 from 8.1. Up from 6.2 after 2u prbc.  Trend hgb. Transfuse if hgb < 7 as per primary team.  HSQ.     Infectious Disease: WBC trending down at 14 from 16 from 21.   +ORSA  Abx: Meropenem, Clinda.  Stage 4 Sacral decubitus & b/l buttock ulcers. Wound care. Follow-up with burn (Dr. Gooden)    Musculoskeletal: R BKA, dressing in place, not saturated.   Pulse checks Q4h. On bedrest. Can be out of bed with assistance on POD#1.    Endocrine: DM, on ISS. FS monitoring    Lines/Tubes:   2 peripheral IVs in R forearm & R shoulder  Suprapubic catheter        Disposition:     --------------------------------------------------------------------------------------    Critical Care Diagnoses:

## 2019-06-04 NOTE — CONSULT NOTE ADULT - SUBJECTIVE AND OBJECTIVE BOX
HPI:  58 M w/ LE weakness 2/2 lumbar compression fracture at the age of 24, resulting in paraplgia/ wheelchair bound (can move LE but very weak, and has some sensation), with subsequent sacral decubiti and heel ulcers s/p debridements in the past, PAD, suprapubic cath, Chronic pain, Neuropathy, Insomnia, DM2 & HTN that resolved w/ weight loss, is BIBA c/o foul smell & drainage from his RLE. Pt states that he noticed skin changes, discoloration, drainage, and a foul smell a couple of days ago on his right foot, he usually follow up with dr pradhan for sacrum wound but hasn't seen him for about 4 months. He denies fevers, chills, sob, cp, admits to decreased appetite.   In ED pt had VS stable/wnl/ afebrile.  Labs - Hgb 6.2. Cr 4.9, K 5.2 on VBG, Ph 7.2.  XRAY of Rt foot Prelim - gas gangrene.  Podiatry, Vascular, Burn, & Renal were called.  Pt given - > Zosyn / Vanc / Flagyl / 2L IVF / & 2 U PRBCs (04 Jun 2019 02:29)    RENAL ;  Nephrology is being called for SILVINA .pt stayed hypotensive, has received fluids and bicarb drip ,SILVINA is improving ,and also has undergone RT below knee amputation today .    PAST MEDICAL & SURGICAL HISTORY  PAST MEDICAL & SURGICAL HISTORY:  Hypertension  Suprapubic catheter  Pressure ulcer  Diabetes  Lumbar compression fracture  S/P debridement  Toe amputation status, right  H/O hernia repair    SOCIAL HISTORY:    ALLERGIES:  sulfamethizole (Unknown)    MEDICATIONS:  STANDING MEDICATIONS  ALPRAZolam 2 milliGRAM(s) Oral at bedtime  chlorhexidine 4% Liquid 1 Application(s) Topical <User Schedule>  clindamycin IVPB 900 milliGRAM(s) IV Intermittent every 8 hours  dextrose 5%. 1000 milliLiter(s) IV Continuous <Continuous>  dextrose 50% Injectable 12.5 Gram(s) IV Push once  dextrose 50% Injectable 25 Gram(s) IV Push once  dextrose 50% Injectable 25 Gram(s) IV Push once  diazepam    Tablet 10 milliGRAM(s) Oral two times a day  docusate sodium 100 milliGRAM(s) Oral three times a day  heparin  Injectable 5000 Unit(s) SubCutaneous every 8 hours  insulin lispro (HumaLOG) corrective regimen sliding scale   SubCutaneous three times a day before meals  meropenem  IVPB 500 milliGRAM(s) IV Intermittent every 12 hours  methadone    Tablet 10 milliGRAM(s) Oral daily  ondansetron Injectable 4 milliGRAM(s) IV Push every 8 hours  sodium chloride 0.9%. 1000 milliLiter(s) IV Continuous <Continuous>    PRN MEDICATIONS  dextrose 40% Gel 15 Gram(s) Oral once PRN  glucagon  Injectable 1 milliGRAM(s) IntraMuscular once PRN  HYDROmorphone  Injectable 0.5 milliGRAM(s) IV Push every 10 minutes PRN  morphine  - Injectable 2 milliGRAM(s) IV Push every 2 hours PRN  oxyCODONE    IR 30 milliGRAM(s) Oral every 12 hours PRN  senna 2 Tablet(s) Oral daily PRN    VITALS:   Vital Signs Last 24 Hrs  T(C): 36.4 (04 Jun 2019 11:37), Max: 37.2 (03 Jun 2019 19:52)  T(F): 97.5 (04 Jun 2019 11:37), Max: 98.9 (03 Jun 2019 19:52)  HR: 77 (04 Jun 2019 14:30) (74 - 89)  BP: 82/57 (04 Jun 2019 14:30) (82/57 - 119/71)  BP(mean): 75 (04 Jun 2019 08:30) (67 - 86)  RR: 15 (04 Jun 2019 14:30) (9 - 26)  SpO2: 99% (04 Jun 2019 14:30) (97% - 100%)    I&O's Detail    03 Jun 2019 07:01  -  04 Jun 2019 07:00  --------------------------------------------------------  IN:    dextrose 5%: 300 mL    IV PiggyBack: 100 mL  Total IN: 400 mL    OUT:    Indwelling Catheter - Suprapubic: 350 mL  Total OUT: 350 mL    Total NET: 50 mL      04 Jun 2019 07:01  -  04 Jun 2019 15:16  --------------------------------------------------------  IN:    dextrose 5%: 150 mL  Total IN: 150 mL    OUT:    Indwelling Catheter - Suprapubic: 195 mL  Total OUT: 195 mL    Total NET: -45 mL        LABS:                        7.2    14.34 )-----------( 367      ( 04 Jun 2019 12:08 )             23.0     06-04    134<L>  |  104  |  69<HH>  ----------------------------<  87  4.7   |  16<L>  |  4.3<HH>    Ca    7.7<L>      04 Jun 2019 12:12  Phos  5.8     06-04  Mg     1.3     06-04    TPro  5.6<L>  /  Alb  1.7<L>  /  TBili  0.4  /  DBili  x   /  AST  15  /  ALT  14  /  AlkPhos  211<H>  06-04    PT/INR - ( 03 Jun 2019 20:54 )   PT: 16.20 sec;   INR: 1.41 ratio         PTT - ( 03 Jun 2019 20:54 )  PTT:38.6 sec    ABG - ( 04 Jun 2019 12:33 )  pH, Arterial: 7.34  pH, Blood: x     /  pCO2: 34    /  pO2: 35    / HCO3: 19    / Base Excess: -6.4  /  SaO2: 67                Creatine Kinase, Serum: 13 U/L (06-04-19 @ 12:12)  Troponin T, Serum: 0.08 ng/mL <HH> (06-04-19 @ 12:12)  Lactate, Blood: 0.7 mmol/L (06-04-19 @ 05:01)  Lactate, Blood: 1.2 mmol/L (06-03-19 @ 20:54)      CARDIAC MARKERS ( 04 Jun 2019 12:12 )  x     / 0.08 ng/mL / 13 U/L / x     / 1.7 ng/mL      RADIOLOGY:  < from: Xray Chest 1 View-PORTABLE IMMEDIATE (06.04.19 @ 14:14) >    Impression:      Low lung volumes, no acute opacifications or effusions.      < end of copied text >    PHYSICAL EXAM:  GEN: No acute distress  HEENT:   LUNGS: Clear to auscultation bilaterally   HEART: S1/S2 present. RRR.   ABD: Soft, non-tender, non-distended. Bowel sounds present  EXT:  NEURO: AAOX3 HPI:  58 M w/ LE weakness 2/2 lumbar compression fracture at the age of 24, resulting in paraplgia/ wheelchair bound (can move LE but very weak, and has some sensation), with subsequent sacral decubiti and heel ulcers s/p debridements in the past, PAD, suprapubic cath, Chronic pain, Neuropathy, Insomnia, DM2 & HTN that resolved w/ weight loss, is BIBA c/o foul smell & drainage from his RLE. Pt states that he noticed skin changes, discoloration, drainage, and a foul smell a couple of days ago on his right foot, he usually follow up with dr pradhan for sacrum wound but hasn't seen him for about 4 months. He denies fevers, chills, sob, cp, admits to decreased appetite.   In ED pt had VS stable/wnl/ afebrile.  Labs - Hgb 6.2. Cr 4.9, K 5.2 on VBG, Ph 7.2.  XRAY of Rt foot Prelim - gas gangrene.  Podiatry, Vascular, Burn, & Renal were called.  Pt given - > Zosyn / Vanc / Flagyl / 2L IVF / & 2 U PRBCs (04 Jun 2019 02:29)    RENAL ;  Nephrology is being called for SILVINA .pt stayed hypotensive, has received fluids 2 L ,2 PRBCs and bicarb drip ,SILVINA is improving ,and also has undergone RT below knee amputation today .    PAST MEDICAL & SURGICAL HISTORY  PAST MEDICAL & SURGICAL HISTORY:  Hypertension  Suprapubic catheter  Pressure ulcer  Diabetes  Lumbar compression fracture  S/P debridement  Toe amputation status, right  H/O hernia repair    SOCIAL HISTORY:    ALLERGIES:  sulfamethizole (Unknown)    MEDICATIONS:  STANDING MEDICATIONS  ALPRAZolam 2 milliGRAM(s) Oral at bedtime  chlorhexidine 4% Liquid 1 Application(s) Topical <User Schedule>  clindamycin IVPB 900 milliGRAM(s) IV Intermittent every 8 hours  dextrose 5%. 1000 milliLiter(s) IV Continuous <Continuous>  dextrose 50% Injectable 12.5 Gram(s) IV Push once  dextrose 50% Injectable 25 Gram(s) IV Push once  dextrose 50% Injectable 25 Gram(s) IV Push once  diazepam    Tablet 10 milliGRAM(s) Oral two times a day  docusate sodium 100 milliGRAM(s) Oral three times a day  heparin  Injectable 5000 Unit(s) SubCutaneous every 8 hours  insulin lispro (HumaLOG) corrective regimen sliding scale   SubCutaneous three times a day before meals  meropenem  IVPB 500 milliGRAM(s) IV Intermittent every 12 hours  methadone    Tablet 10 milliGRAM(s) Oral daily  ondansetron Injectable 4 milliGRAM(s) IV Push every 8 hours  sodium chloride 0.9%. 1000 milliLiter(s) IV Continuous <Continuous>    PRN MEDICATIONS  dextrose 40% Gel 15 Gram(s) Oral once PRN  glucagon  Injectable 1 milliGRAM(s) IntraMuscular once PRN  HYDROmorphone  Injectable 0.5 milliGRAM(s) IV Push every 10 minutes PRN  morphine  - Injectable 2 milliGRAM(s) IV Push every 2 hours PRN  oxyCODONE    IR 30 milliGRAM(s) Oral every 12 hours PRN  senna 2 Tablet(s) Oral daily PRN    VITALS:   Vital Signs Last 24 Hrs  T(C): 36.4 (04 Jun 2019 11:37), Max: 37.2 (03 Jun 2019 19:52)  T(F): 97.5 (04 Jun 2019 11:37), Max: 98.9 (03 Jun 2019 19:52)  HR: 77 (04 Jun 2019 14:30) (74 - 89)  BP: 82/57 (04 Jun 2019 14:30) (82/57 - 119/71)  BP(mean): 75 (04 Jun 2019 08:30) (67 - 86)  RR: 15 (04 Jun 2019 14:30) (9 - 26)  SpO2: 99% (04 Jun 2019 14:30) (97% - 100%)    I&O's Detail    03 Jun 2019 07:01  -  04 Jun 2019 07:00  --------------------------------------------------------  IN:    dextrose 5%: 300 mL    IV PiggyBack: 100 mL  Total IN: 400 mL    OUT:    Indwelling Catheter - Suprapubic: 350 mL  Total OUT: 350 mL    Total NET: 50 mL      04 Jun 2019 07:01  -  04 Jun 2019 15:16  --------------------------------------------------------  IN:    dextrose 5%: 150 mL  Total IN: 150 mL    OUT:    Indwelling Catheter - Suprapubic: 195 mL  Total OUT: 195 mL    Total NET: -45 mL        LABS:                        7.2    14.34 )-----------( 367      ( 04 Jun 2019 12:08 )             23.0     06-04    134<L>  |  104  |  69<HH>  ----------------------------<  87  4.7   |  16<L>  |  4.3<HH>    Ca    7.7<L>      04 Jun 2019 12:12  Phos  5.8     06-04  Mg     1.3     06-04    TPro  5.6<L>  /  Alb  1.7<L>  /  TBili  0.4  /  DBili  x   /  AST  15  /  ALT  14  /  AlkPhos  211<H>  06-04    PT/INR - ( 03 Jun 2019 20:54 )   PT: 16.20 sec;   INR: 1.41 ratio         PTT - ( 03 Jun 2019 20:54 )  PTT:38.6 sec    ABG - ( 04 Jun 2019 12:33 )  pH, Arterial: 7.34  pH, Blood: x     /  pCO2: 34    /  pO2: 35    / HCO3: 19    / Base Excess: -6.4  /  SaO2: 67                Creatine Kinase, Serum: 13 U/L (06-04-19 @ 12:12)  Troponin T, Serum: 0.08 ng/mL <HH> (06-04-19 @ 12:12)  Lactate, Blood: 0.7 mmol/L (06-04-19 @ 05:01)  Lactate, Blood: 1.2 mmol/L (06-03-19 @ 20:54)      CARDIAC MARKERS ( 04 Jun 2019 12:12 )  x     / 0.08 ng/mL / 13 U/L / x     / 1.7 ng/mL      RADIOLOGY:  < from: Xray Chest 1 View-PORTABLE IMMEDIATE (06.04.19 @ 14:14) >    Impression:      Low lung volumes, no acute opacifications or effusions.      < end of copied text >    PHYSICAL EXAM:  GEN: No acute distress  HEENT: within normal range  LUNGS: Clear to auscultation bilaterally   HEART: S1/S2 present. RRR.   ABD: Soft, non-tender, non-distended. Bowel sounds present , Supra pubic cath in place  EXT: s/p RT below knee amputation   NEURO: AAOX3

## 2019-06-04 NOTE — CONSULT NOTE ADULT - ASSESSMENT
wet gangren right foot--> agreed with right bka--> pt and family agreeable     (pt now in RR post right bka

## 2019-06-04 NOTE — CONSULT NOTE ADULT - ASSESSMENT
IMPRESSION: sepsis/ r foot gangrene/ renal failure/ on bicarb drip/ multiple co morbidities/ s/p 2 units PRBC      PLAN:    CNS: KEEP METHADONE AS OP    HEENT:  Oral care    PULMONARY:  HOB @ 45 degrees,  KEEP SAO2 >92%    CARDIOVASCULAR: BICARB DRIP, CE, CK    GI: GI prophylaxis                                          Feeding NPO    RENAL:  F/u  lytes.  Correct as needed. accurate I/O, RENAL US, RENAL EVAL, KEEP BICARB DRIP, REPEAT CMP    INFECTIOUS DISEASE: VANCO/ KAMILLA TILL CX, ID EVAL    HEMATOLOGICAL:  DVT prophylaxis.    ENDOCRINE:  Follow up FS.  Insulin protocol if needed.    MUSCULOSKELETAL: BD REST  CODE STATUS: FULL CODE    DISPOSITION: MICU FOR NOW  POOR PROGNOSIS

## 2019-06-04 NOTE — H&P ADULT - NSICDXPASTMEDICALHX_GEN_ALL_CORE_FT
PAST MEDICAL HISTORY:  Diabetes     Hypertension     Lumbar compression fracture     Pressure ulcer     Suprapubic catheter

## 2019-06-04 NOTE — CONSULT NOTE ADULT - SUBJECTIVE AND OBJECTIVE BOX
57 yo m hx htn, dm, s/p suprapubic cath, distant lumbar injury, diabetic foot ulcer seen by Dr. Gooden in past here for worsening of foot ulcer. Patient  increasing discoloration and foul smelling odor. Patient had been poor to follow up. States no  fever/chills/cp/sob/n/v. Patient is non compliant with doctor visits and antibiotics.        Past Medical History/ Surgical History:  Hypertension  Suprapubic catheter  Pressure ulcer  Diabetes  Lumbar compression fracture  S/P debridement  Toe amputation status, right  H/O hernia repair      Allergies:sulfamethizole (Unknown)    Medications:docusate sodium 100 mg oral capsule: 1 cap(s) orally 2 times a day  gabapentin 800 mg oral tablet: 1 tab(s) orally 3 times a day  glucose 4 g oral tablet, chewable: 4 tab(s) orally once as needed  lactulose 10 g/15 mL oral syrup: 30 milliliter(s) orally once a day (at bedtime)  Levaquin 500 mg oral tablet: 1 tab(s) orally once a day   lisinopril 20 mg oral tablet: 1 tab(s) orally once a day  methadone: 80 milligram(s) orally 4 times a day  oxybutynin 10 mg/24 hr oral tablet, extended release: 1 tab(s) orally once a day  oxyCODONE 30 mg oral tablet: 1 tab(s) orally 4 times a day  pantoprazole 40 mg oral delayed release tablet: 1 tab(s) orally once a day (before a meal)  polyethylene glycol 3350 oral powder for reconstitution: 17 gram(s) orally 2 times a day, As needed, Constipation  senna oral tablet: 2 tab(s) orally once a day (at bedtime)  sodium hypochlorite 0.25% topical solution: 1 application topically every 12 hours  Testim 1% (50 mg/5 g) transdermal gel: 1 packet(s) transdermal once a day (in the morning)  Valium 10 mg oral tablet: orally 2 times a day  Xanax 2 mg oral tablet: orally once (at bedtime)  Zyvox 600 mg oral tablet: 1 tab(s) orally 2 times a day   calcium gluconate IVPB 1 Gram(s) IV Intermittent Once  lactated ringers. 1000 milliLiter(s) IV Continuous <Continuous>  sodium chloride 0.9%. 1000 milliLiter(s) IV Continuous <Continuous>  sodium polystyrene sulfonate Suspension 15 Gram(s) Oral Once  vancomycin  IVPB 1000 milliGRAM(s) IV Intermittent once      Physical Exam:  Vitals:T(C): 36.9 (06-03-19 @ 22:50), Max: 37.2 (06-03-19 @ 19:52)  HR: 84 (06-03-19 @ 22:50) (84 - 89)  BP: 96/58 (06-03-19 @ 22:50) (96/58 - 101/50)  RR: 18 (06-03-19 @ 22:50) (18 - 19)  SpO2: 97% (06-03-19 @ 22:50) (97% - 97%)    General: Alert and oriented times 3 , Not in acute distress   Heart: Regular rate and rhythm , no rubs murmurs or gallops  Lungs: Clear to auscultation , no wheezes , rales rhonci or adventicious breath sounds  Abdomen: Soft , positive bowel sounds, non tender, non distended, no peritoneal signs  Extemities:  warm, discolored erythematous no swelling , no edema, good motor and sensation positive pulses   R foot- discoloration/edema to R foot, ulceration to bone wet gangrene, first three toes removed                6.2    21.57 )-----------( 514      ( 06-03 @ 20:54 )             20.7                    134   |  104   |  72                 Ca: 8.0    BMP:   ----------------------------< 81     Mg: x     (06-03-19 @ 20:54)             5.9    |  14    | 4.9                Ph: x        LFT:     TPro: 5.9 / Alb: 1.6 / TBili: <0.2 / DBili: x / AST: 18 / ALT: 15 / AlkPhos: 235   (06-03-19 @ 20:54)          PT/INR - ( 03 Jun 2019 20:54 )   PT: 16.20 sec;   INR: 1.41 ratio         PTT - ( 03 Jun 2019 20:54 )  PTT:38.6 sec

## 2019-06-04 NOTE — H&P ADULT - NSHPLABSRESULTS_GEN_ALL_CORE
LABS:                        6.2    21.57 )-----------( 514      ( 03 Jun 2019 20:54 )             20.7     06-03    134<L>  |  104  |  72<HH>  ----------------------------<  81  5.9<H>   |  14<L>  |  4.9<HH>    Ca    8.0<L>      03 Jun 2019 20:54    TPro  5.9<L>  /  Alb  1.6<L>  /  TBili  <0.2  /  DBili  x   /  AST  18  /  ALT  15  /  AlkPhos  235<H>  06-03    PT/INR - ( 03 Jun 2019 20:54 )   PT: 16.20 sec;   INR: 1.41 ratio         PTT - ( 03 Jun 2019 20:54 )  PTT:38.6 sec      Lactate, Blood: 1.2 mmol/L (06-03-19 @ 20:54)          RADIOLOGY:

## 2019-06-04 NOTE — CONSULT NOTE ADULT - SUBJECTIVE AND OBJECTIVE BOX
NEPHROLOGY CONSULTATION NOTE    58 M w/ LE weakness 2/2 lumbar compression fracture at the age of 24, resulting in paraplgia/ wheelchair bound (can move LE but very weak, and has some sensation), with subsequent sacral decubiti and heel ulcers s/p debridements in the past, PAD, suprapubic cath, Chronic pain, Neuropathy, Insomnia, DM2 & HTN that resolved w/ weight loss, is BIBA c/o foul smell & drainage from his RLE. Pt states that he noticed skin changes, discoloration, drainage, and a foul smell a couple of days ago on his right foot, he usually follow up with dr pradhan for sacrum wound but hasn't seen him for about 4 months. He denies fevers, chills, sob, cp, admits to decreased appetite.   In ED pt had VS stable/wnl/ afebrile.  Labs - Hgb 6.2. Cr 4.9, K 5.2 on VBG, Ph 7.2.  XRAY of Rt foot Prelim - gas gangrene.  Podiatry, Vascular, Burn, & Renal were called.  Pt given - > Zosyn / Vanc / Flagyl / 2L IVF / & 2 U PRBCs     PAST MEDICAL & SURGICAL HISTORY:  Hypertension  Suprapubic catheter  Pressure ulcer  Diabetes  Lumbar compression fracture  S/P debridement  Toe amputation status, right  H/O hernia repair    Allergies:  sulfamethizole (Unknown)    Home Medications Reviewed  Hospital Medications:   MEDICATIONS  (STANDING):  ALPRAZolam 2 milliGRAM(s) Oral at bedtime  clindamycin IVPB 900 milliGRAM(s) IV Intermittent every 8 hours  dextrose 5% 1000 milliLiter(s) (150 mL/Hr) IV Continuous <Continuous>  diazepam    Tablet 10 milliGRAM(s) Oral at bedtime  meropenem  IVPB 500 milliGRAM(s) IV Intermittent every 12 hours  methadone    Tablet 10 milliGRAM(s) Oral daily  vancomycin  IVPB 1000 milliGRAM(s) IV Intermittent every 24 hours      SOCIAL HISTORY:  Denies ETOH,Smoking,   FAMILY HISTORY:        REVIEW OF SYSTEMS:  CONSTITUTIONAL: No weakness, fevers or chills  EYES/ENT: No visual changes;  No vertigo or throat pain   NECK: No pain or stiffness  RESPIRATORY: No cough, wheezing, hemoptysis; No shortness of breath  CARDIOVASCULAR: No chest pain or palpitations.  GASTROINTESTINAL: No abdominal or epigastric pain. No nausea, vomiting, or hematemesis; No diarrhea or constipation. No melena or hematochezia.  GENITOURINARY: No dysuria, frequency, foamy urine, urinary urgency, incontinence or hematuria  NEUROLOGICAL: No numbness or weakness  SKIN: No itching, burning, rashes, or lesions   VASCULAR: No bilateral lower extremity edema.   All other review of systems is negative unless indicated above.    VITALS:  T(F): 98.4 (06-03-19 @ 22:50), Max: 98.9 (06-03-19 @ 19:52)  HR: 79 (06-04-19 @ 08:30)  BP: 100/59 (06-04-19 @ 08:30)  RR: 15 (06-04-19 @ 08:30)  SpO2: 100% (06-04-19 @ 08:30)    06-03 @ 07:01  -  06-04 @ 07:00  --------------------------------------------------------  IN: 400 mL / OUT: 350 mL / NET: 50 mL    06-04 @ 07:01  -  06-04 @ 08:42  --------------------------------------------------------  IN: 150 mL / OUT: 75 mL / NET: 75 mL      Height (cm): 185.42 (06-04 @ 08:30)  Weight (kg): 70.8 (06-04 @ 08:30)  BMI (kg/m2): 20.6 (06-04 @ 08:30)  BSA (m2): 1.94 (06-04 @ 08:30)      I&O's Detail    03 Jun 2019 07:01  -  04 Jun 2019 07:00  --------------------------------------------------------  IN:    dextrose 5%: 300 mL    IV PiggyBack: 100 mL  Total IN: 400 mL    OUT:    Indwelling Catheter - Suprapubic: 350 mL  Total OUT: 350 mL    Total NET: 50 mL      04 Jun 2019 07:01  -  04 Jun 2019 08:42  --------------------------------------------------------  IN:    dextrose 5%: 150 mL  Total IN: 150 mL    OUT:    Indwelling Catheter - Suprapubic: 75 mL  Total OUT: 75 mL    Total NET: 75 mL            PHYSICAL EXAM:  Constitutional: NAD  HEENT: anicteric sclera, oropharynx clear, MMM  Neck: No JVD  Respiratory: CTAB, no wheezes, rales or rhonchi  Cardiovascular: S1, S2, RRR  Gastrointestinal: BS+, soft, NT/ND  Extremities: No cyanosis or clubbing. No peripheral edema  Neurological: A/O x 3, no focal deficits  Psychiatric: Normal mood, normal affect  : No CVA tenderness. No brown.   Skin: No rashes  Vascular Access:    LABS:  06-04    133<L>  |  103  |  67<HH>  ----------------------------<  62<L>  5.1<H>   |  13<L>  |  4.6<HH>    Ca    7.8<L>      04 Jun 2019 05:01  Phos  6.2     06-04  Mg     1.4     06-04    TPro  5.6<L>  /  Alb  1.7<L>  /  TBili  0.4  /  DBili      /  AST  15  /  ALT  14  /  AlkPhos  211<H>  06-04    Creatinine Trend: 4.6 <--, 4.9 <--                        8.1    15.96 )-----------( 395      ( 04 Jun 2019 05:01 )             27.5     Urine Studies:              RADIOLOGY & ADDITIONAL STUDIES:

## 2019-06-04 NOTE — H&P ADULT - HISTORY OF PRESENT ILLNESS
58 M w/ LE weakness 2/2 lumbar compression fracture at the age of 24, resulting in paraplgia/ wheelchair bound (can move LE but very weak, and has some sensation), with subsequent sacral decubiti and heel ulcers s/p debridements in the past, PAD, suprapubic cath, Chronic pain, Neuropathy, Insomnia, DM2 & HTN that resolved w/ weight loss, is BIBA c/o foul smell & drainage from his RLE. Pt states that he noticed skin changes, discoloration, drainage, and a foul smell a couple of days ago on his right foot, he usually follow up with dr pradhan for sacrum wound but hasn't seen him for about 4 months. He denies fevers, chills, sob, cp, admits to decreased appetite.   In ED pt had VS stable/wnl/ afebrile.  Labs - Hgb 6.2. Cr 4.9, K 5.2 on VBG, Ph 7.2.  XRAY of Rt foot Prelim - gas gangrene.  Podiatry, Vascular, Burn, & Renal were called.  Pt given - > Zosyn / Vanc / Flagyl / 2L IVF / & 2 U PRBCs

## 2019-06-04 NOTE — CONSULT NOTE ADULT - ASSESSMENT
ASSESSMENT  58 M w/ LE weakness 2/2 lumbar compression fracture at the age of 24, resulting in paraplegia wheelchair bound (can move LE but very weak, and has some sensation), with subsequent sacral decubiti and heel ulcers s/p debridements in the past, PAD, suprapubic cath, Chronic pain, Neuropathy, Insomnia, DM2 & HTN that resolved w/ weight loss, is BIBA c/o foul smell & drainage from his RLE    PROBLEMS  Sepsis ruled out on admission, systolic hypotension, WBC>12  SILVINA   Necrotizing fasciitis s/p OR 6/4 Right Below knee guillotine amputation  Pt has an acute illness which poses threat to bodily function  Hyponatremia/Hypomagnesia    RECOMMENDATIONS  - F/u blood cultures, OR cultures  - CHANGE to Meropenem 1g q24h IV  - Clinda 900mg q8h IV     Spectra [id number]

## 2019-06-04 NOTE — ED PROCEDURE NOTE - CPROC ED SITE PREP1
Radiology Discharge Summary      Admit date: 3/28/2018  9:37 AM  Discharge date: March 28, 2018    Instructions Given to patient: YesVerbal    Diet: Regular    Activity:NO Restrictions    Medications on discharge (List): Refer to Discharge Medication List    Hospital Course: OPt with lymphoma brought to Ct and following informed consent and time-out received moderate sedation 50 mcg fentanyl and versed 2 mg IV and was monitored by Dr. Mares and RN Freddy for bp, pulse and pulse oximetry from 11:30 to 11.55. 11 g jamshidi needle used to obtain bone marrow aspirate. Yield adequate by pathology tech. Core biopsy then performed with good specimen on 2nd try. Total blood loss less than 2 cc. Bandage placed and pt sent to day surgery for two hours recovery. Then DC home.     Description of Condition on Discharge: stable    Discharge Disposition: Home    Discharge Diagnosis: lymphoma    Patient to Follow up with Dr. Garcia   chlorhexidine

## 2019-06-04 NOTE — CONSULT NOTE ADULT - SUBJECTIVE AND OBJECTIVE BOX
PODIATRY CONSULT   MARGE BELLA is a pleasant well-nourished, well developed 58y Male in no acute distress, alert awake, and oriented to person, place and time.   Patient is a 58y old  Male who presents with a chief complaint of right foot infection  HPI:  pt states that he noticed skin cranages couple of days ago on his right foot. he usually follow up with dr pradhan for sacrum wound but hasn't seen him for about 4 months now.  he is wheelchair bound. paralyzed waist down 2/2 trauma.   he states that he has h/x of right 1/2/3 digit amputation last winter and vascular intervention by Dr. cee.    Hypertension  Suprapubic catheter  Pressure ulcer  Diabetes  Lumbar compression fracture      PMH: Hypertension  Suprapubic catheter  Pressure ulcer  Diabetes  Lumbar compression fracture    PSH: S/P debridement  Toe amputation status, right  H/O hernia repair  No significant past surgical history    Medication   Allergy: sulfamethizole (Unknown)    REVIEW OF SYSTEMS:    CONSTITUTIONAL: chills  EYES/ENT: No visual changes;  No vertigo or throat pain   NECK: No pain or stiffness  RESPIRATORY: No cough, wheezing, hemoptysis; No shortness of breath  CARDIOVASCULAR: No chest pain or palpitations  GASTROINTESTINAL: No abdominal or epigastric pain. No nausea, vomiting, or hematemesis; No diarrhea or constipation. No melena or hematochezia.  GENITOURINARY: No dysuria, frequency or hematuria  NEUROLOGICAL: paralyzed waist down  SKIN: edema RLE  All other review of systems is negative unless indicated below.    Labs:                        6.2    21.57 )-----------( 514      ( 03 Jun 2019 20:54 )             20.7     PT/INR - ( 03 Jun 2019 20:54 )   PT: 16.20 sec;   INR: 1.41 ratio         PTT - ( 03 Jun 2019 20:54 )  PTT:38.6 sec  06-03    134<L>  |  104  |  72<HH>  ----------------------------<  81  5.9<H>   |  14<L>  |  4.9<HH>    Ca    8.0<L>      03 Jun 2019 20:54    TPro  5.9<L>  /  Alb  1.6<L>  /  TBili  <0.2  /  DBili  x   /  AST  18  /  ALT  15  /  AlkPhos  235<H>  06-03            O:   Derm: multiple necrotic skin noted right ankle and exposed necrotic calcaneus bone with crepitus noted.  + hyperkeratotic border, wound base Necrotic patches, + edema, + any-wound erythema, + purulence, + fluctuance, + tracking/tunneling, + probe to bone. + streaking erythema. - xerosis, - hemosiderin deposits. + active sign of infection, +malodor, macerated skin lateral plantar forefoot.  Vascular: Dorsalis Pedis and Posterior Tibial pulses 0/4.  CFT delayed right foot, cold to touch.  Neuro: Protective sensation diminished to right foot  MSK: h/o amputation 1/2/3 digit right foot. rockerbottom foot.        Assessment and Plan:   infected right foot  erythem/edema/pus  likely necrotizing fasciitis    Chart reviewed and Patient evaluated  Discussed diagnosis and treatment with patient  Wound flush with normal saline  Applied betadine with dry sterile dressing  Obtained wound culture to be sent to Pathology  X-rays reviewed: likely necrotizing fasciitis foot/ankle right foot, f/u with final report  Recommend ID consult  vascular onboard  burn onboard  Continue IV abx as per ID  Arterial duplex ordered  podiatry recommend R BKA, vascular aware.  f/u with attending morning

## 2019-06-04 NOTE — CONSULT NOTE ADULT - SUBJECTIVE AND OBJECTIVE BOX
pt with right labia edema and erythema with air labia on CT scan    PE: Right labia with mild erythema and edema, no tenderness, soft, no induration, no open wound , no drainage

## 2019-06-04 NOTE — CONSULT NOTE ADULT - ASSESSMENT
58 M w/ PMH of Paraplegia 2/2 Lumbar compression Fx, sacral decubiti and heel ulcers s/p debridements,, PAD, suprapubic cath,Neuropathy, DM2 & HTN that resolved w/ weight loss, is BIBA c/o foul smell & drainage from his RLE, found to be anemic, in SILVINA, & gangrene of RLE.      # SILVINA w/ Metabolic Acidosis (Cr 4.9, baseline 1.1 in   8/ 2018) and electrolyte abn ;  r/o ATN   - off bicarb drip now   -cr improving and anion gap closed   - check Renal US    - cw IV fluids   - plz send urine creatinine and Urine Lytes  - avoid nephrotoxic drugs   -replete electrolytes   -monitor urine out put  - avoid hypotension       # Microcytic Anemia  - s/p 2 U PRBCs in ER   plz check iron studies       ATTENDING NOTE TO BE FOLLOWED 58 M w/ PMH of Paraplegia 2/2 Lumbar compression Fx, sacral decubiti and heel ulcers s/p debridements,, PAD, suprapubic cath,Neuropathy, DM2 & HTN that resolved w/ weight loss, is BIBA c/o foul smell & drainage from his RLE, found to be anemic, in SILVINA, & gangrene of RLE.      # SILVINA w/ Metabolic Acidosis (Cr 4.9, baseline 1.1 in   8/ 2018) and electrolyte abn ;  r/o ATN   - off bicarb drip now   -cr improving and anion gap closed   - check Renal US    - cw IV fluids   - plz send urine creatinine and Urine Lytes  - avoid nephrotoxic drugs   -replete electrolytes   -monitor urine out put  - avoid hypotension   -renal doing of Meropenam      # Microcytic Anemia  - s/p 2 U PRBCs in ER   plz check iron studies       ATTENDING NOTE TO BE FOLLOWED 58 M w/ PMH of Paraplegia 2/2 Lumbar compression Fx, sacral decubiti and heel ulcers s/p debridements,, PAD, suprapubic cath,Neuropathy, DM2 & HTN that resolved w/ weight loss, is BIBA c/o foul smell & drainage from his RLE, found to be anemic, in SILVINA, & gangrene of RLE. Renal is consulted for SILVINA.      # SILVINA w/ Metabolic Acidosis (Cr 4.9, baseline 1.1 in   8/ 2018) and electrolyte abn ;  r/o ATN   -likely pre renal   - off bicarb drip now   -cr improving and anion gap closed  -producing urine   - check Renal US    - cw IV fluids   - plz send urine creatinine and Urine Lytes  - avoid nephrotoxic drugs   -replete electrolytes (magnesium)  -monitor urine out put  - avoid hypotension   -renal doing of Meropenum  -no need for RRT -  -      # Microcytic Anemia  - s/p 2 U PRBCs in ER   plz check iron studies       ATTENDING NOTE TO BE FOLLOWED

## 2019-06-04 NOTE — CONSULT NOTE ADULT - SUBJECTIVE AND OBJECTIVE BOX
Patient is a 58y old  Male who presents with a chief complaint of  R foot pain    HPI:  58 M w/ LE weakness 2/2 lumbar compression fracture at the age of 24, resulting in paraplegia/ wheelchair bound (can move LE but very weak, and has some sensation), with subsequent sacral decubiti and heel ulcers s/p debridements in the past, PAD, suprapubic cath, Chronic pain, Neuropathy, Insomnia, DM2 & HTN brought by BIBA c/o foul smell & drainage from his RLE. Pt states that he noticed skin changes, discoloration, drainage, and a foul smell a couple of days ago on his right foot, he usually follow up with dr pradhan for sacrum wound but hasn't seen him for about 4 months. He denies fevers, chills, sob, cp, admits to decreased appetite.   In ED pt had VS stable/wnl/ afebrile.  Labs - Hgb 6.2. Cr 4.9, K 5.2 on VBG, Ph 7.2.  XRAY of Rt foot Prelim - gas gangrene??  Podiatry, Vascular, Burn, & Renal were called.  Pt given - > Zosyn / Vanc / Flagyl / 2L IVF / & 2 U PRBCs (04 Jun 2019 02:29), events noted for BKA, still on bicarb drip      PAST MEDICAL & SURGICAL HISTORY:  Hypertension  Suprapubic catheter  Pressure ulcer  Diabetes  Lumbar compression fracture  S/P debridement  Toe amputation status, right  H/O hernia repair      SOCIAL HX:   Smoking  +  FAMILY HISTORY:      REVIEW OF SYSTEMS see hpi        Allergies    sulfamethizole (Unknown)    Intolerances        ALPRAZolam 2 milliGRAM(s) Oral at bedtime  chlorhexidine 4% Liquid 1 Application(s) Topical <User Schedule>  clindamycin IVPB 900 milliGRAM(s) IV Intermittent every 8 hours  dextrose 5% 1000 milliLiter(s) IV Continuous <Continuous>  diazepam    Tablet 10 milliGRAM(s) Oral at bedtime  meropenem  IVPB 500 milliGRAM(s) IV Intermittent every 12 hours  methadone    Tablet 10 milliGRAM(s) Oral daily  morphine  - Injectable 2 milliGRAM(s) IV Push every 2 hours PRN  ondansetron Injectable 4 milliGRAM(s) IV Push every 6 hours PRN  oxyCODONE    IR 30 milliGRAM(s) Oral every 12 hours PRN  vancomycin  IVPB 1000 milliGRAM(s) IV Intermittent every 24 hours  : Home Meds:      PHYSICAL EXAM    ICU Vital Signs Last 24 Hrs  T(C): 36.9 (03 Jun 2019 22:50), Max: 37.2 (03 Jun 2019 19:52)  T(F): 98.4 (03 Jun 2019 22:50), Max: 98.9 (03 Jun 2019 19:52)  HR: 79 (04 Jun 2019 08:00) (74 - 89)  BP: 100/59 (04 Jun 2019 08:00) (87/54 - 119/71)  BP(mean): 75 (04 Jun 2019 08:00) (67 - 86)  RR: 15 (04 Jun 2019 08:00) (9 - 26)  SpO2: 100% (04 Jun 2019 08:00) (97% - 100%)      General:  HEENT:  looks comfortable              Lymph Nodes: No cervical LN   Lungs: Bilateral BS, good air entry  Cardiovascular: Regular  Abdomen: Soft, Positive BS  Extremities: r foot foul smelling/ necrotic ulcer  Skin: sacral ulcer  Neurological: Non focal       06-03-19 @ 07:01  -  06-04-19 @ 07:00  --------------------------------------------------------  IN:    dextrose 5%: 300 mL    IV PiggyBack: 100 mL  Total IN: 400 mL    OUT:    Indwelling Catheter - Suprapubic: 350 mL  Total OUT: 350 mL    Total NET: 50 mL      06-04-19 @ 07:01  -  06-04-19 @ 08:21  --------------------------------------------------------  IN:    dextrose 5%: 150 mL  Total IN: 150 mL    OUT:    Indwelling Catheter - Suprapubic: 75 mL  Total OUT: 75 mL    Total NET: 75 mL          LABS:                          8.1    15.96 )-----------( 395      ( 04 Jun 2019 05:01 )             27.5                                               06-04    133<L>  |  103  |  67<HH>  ----------------------------<  62<L>  5.1<H>   |  13<L>  |  4.6<HH>    Ca    7.8<L>      04 Jun 2019 05:01  Phos  6.2     06-04  Mg     1.4     06-04    TPro  5.6<L>  /  Alb  1.7<L>  /  TBili  0.4  /  DBili  x   /  AST  15  /  ALT  14  /  AlkPhos  211<H>  06-04      PT/INR - ( 03 Jun 2019 20:54 )   PT: 16.20 sec;   INR: 1.41 ratio         PTT - ( 03 Jun 2019 20:54 )  PTT:38.6 sec                                                                                     LIVER FUNCTIONS - ( 04 Jun 2019 05:01 )  Alb: 1.7 g/dL / Pro: 5.6 g/dL / ALK PHOS: 211 U/L / ALT: 14 U/L / AST: 15 U/L / GGT: x                                                                                                                                       X-Rays  reviewed  foot x ray reviewed    MEDICATIONS  (STANDING):  ALPRAZolam 2 milliGRAM(s) Oral at bedtime  chlorhexidine 4% Liquid 1 Application(s) Topical <User Schedule>  clindamycin IVPB 900 milliGRAM(s) IV Intermittent every 8 hours  dextrose 5% 1000 milliLiter(s) (150 mL/Hr) IV Continuous <Continuous>  diazepam    Tablet 10 milliGRAM(s) Oral at bedtime  meropenem  IVPB 500 milliGRAM(s) IV Intermittent every 12 hours  methadone    Tablet 10 milliGRAM(s) Oral daily  vancomycin  IVPB 1000 milliGRAM(s) IV Intermittent every 24 hours    MEDICATIONS  (PRN):  morphine  - Injectable 2 milliGRAM(s) IV Push every 2 hours PRN Severe Pain (7 - 10)  ondansetron Injectable 4 milliGRAM(s) IV Push every 6 hours PRN Nausea  oxyCODONE    IR 30 milliGRAM(s) Oral every 12 hours PRN Moderate Pain (4 - 6)

## 2019-06-04 NOTE — H&P ADULT - NSHPPHYSICALEXAM_GEN_ALL_CORE
VITALS:   T(F): 98.4  HR: 84  BP: 96/58  RR: 18  SpO2: 97%    PHYSICAL EXAM:  GEN: No acute distress  LUNGS: Clear to auscultation bilaterally   HEART: S1/S2 present. RRR.   ABD: Soft, non-tender, non-distended. Bowel sounds present  EXT: multiple necrotic skin noted right ankle and exposed necrotic calcaneus bone with crepitus noted.  NEURO: [id number]

## 2019-06-04 NOTE — CONSULT NOTE ADULT - ASSESSMENT
Assesment and Plan:  Patient is a 58y Male presenting with RLE multiple open wounds, possible gas gangrene    Attending to see in the AM  recommend vascular see for possible BKA  recommend podiatry c/s  recommend antibiotics and ID c/s  medical admission

## 2019-06-04 NOTE — CONSULT NOTE ADULT - SUBJECTIVE AND OBJECTIVE BOX
SICU Consultation Note    =====================================================    HPI:  59y/o M, BIBEMS on 6/3/19 after noticing R foot skin changes, discoloration, drainage, and a foul smell a couple of days ago. 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 Patient was hypotensive to the low 100s inta-op, resolved with fluids, no other acute events during the case.    PMH significant for LE weakness 2/2 lumbar compression fracture at the age of 24, resulting in paraplegia & wheelchair bound (can move LE but very weak, and has some sensation), with subsequent sacral decubiti and heel ulcers s/p debridements in the past (w/ Dr. Gooden, but has been noncompliant with f/u visits and antibiotics), PAD, suprapubic catheter, chronic pain, neuropathy, insomnia, DM2 & HTN that resolved w/ weight loss. Pt has seen Dr. Muñiz in the past for vascular procedures. He denied fevers, chills, sob, cp, admits to decreased appetite.       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  H/O hernia repair    Home Meds: Home Medications:  gabapentin 800 mg oral tablet: 1 tab(s) orally 3 times a day (27 Jul 2018 01:14)  methadone: 10 milligram(s) orally once a day (04 Jun 2019 02:46)  oxyCODONE 30 mg oral tablet: 1 tab(s) orally 4 times a day (03 Aug 2018 14:30)  Testim 1% (50 mg/5 g) transdermal gel: 1 packet(s) transdermal once a day (in the morning) (27 Jul 2018 01:14)  Valium 10 mg oral tablet: orally 2 times a day (27 Jul 2018 01:14)  Xanax 2 mg oral tablet: orally once (at bedtime) (27 Jul 2018 01:14)    ALLERGIES:  Sulfamethizole (Unknown)    Intolerances      Soc:   Advanced Directives: Presumed Full Code     ROS:    REVIEW OF SYSTEMS    [x ] A ten-point review of systems was otherwise negative except as noted.      CURRENT MEDICATIONS:   --------------------------------------------------------------------------------------  Neurologic Medications  ALPRAZolam 2 milliGRAM(s) Oral at bedtime  diazepam    Tablet 10 milliGRAM(s) Oral at bedtime  HYDROmorphone  Injectable 0.5 milliGRAM(s) IV Push every 10 minutes PRN Moderate Pain (4 - 6)  methadone    Tablet 10 milliGRAM(s) Oral daily  morphine  - Injectable 2 milliGRAM(s) IV Push every 2 hours PRN Severe Pain (7 - 10)  ondansetron Injectable 4 milliGRAM(s) IV Push every 6 hours PRN Nausea  ondansetron Injectable 4 milliGRAM(s) IV Push once PRN Nausea and/or Vomiting  oxyCODONE    IR 30 milliGRAM(s) Oral every 12 hours PRN Moderate Pain (4 - 6)    Respiratory Medications    Cardiovascular Medications    Gastrointestinal Medications  dextrose 5%. 1000 milliLiter(s) IV Continuous <Continuous>  docusate sodium 100 milliGRAM(s) Oral three times a day  lactated ringers. 1000 milliLiter(s) IV Continuous <Continuous>  senna 2 Tablet(s) Oral daily PRN Constipation    Genitourinary Medications    Hematologic/Oncologic Medications  heparin  Injectable 5000 Unit(s) SubCutaneous every 8 hours    Antimicrobial/Immunologic Medications  clindamycin IVPB 900 milliGRAM(s) IV Intermittent every 8 hours  meropenem  IVPB 500 milliGRAM(s) IV Intermittent every 12 hours    Endocrine/Metabolic Medications  dextrose 40% Gel 15 Gram(s) Oral once PRN Blood Glucose LESS THAN 70 milliGRAM(s)/deciliter  dextrose 50% Injectable 12.5 Gram(s) IV Push once  dextrose 50% Injectable 25 Gram(s) IV Push once  dextrose 50% Injectable 25 Gram(s) IV Push once  glucagon  Injectable 1 milliGRAM(s) IntraMuscular once PRN Glucose LESS THAN 70 milligrams/deciliter  insulin lispro (HumaLOG) corrective regimen sliding scale   SubCutaneous three times a day before meals    Topical/Other Medications  chlorhexidine 4% Liquid 1 Application(s) Topical <User Schedule>    --------------------------------------------------------------------------------------    VITAL SIGNS, INS/OUTS (last 24 hours):  --------------------------------------------------------------------------------------  ICU Vital Signs Last 24 Hrs  T(C): 36.9 (03 Jun 2019 22:50), Max: 37.2 (03 Jun 2019 19:52)  T(F): 98.4 (03 Jun 2019 22:50), Max: 98.9 (03 Jun 2019 19:52)  HR: 80 (04 Jun 2019 11:52) (74 - 89)  BP: 115/69 (04 Jun 2019 11:52) (87/54 - 119/71)  BP(mean): 75 (04 Jun 2019 08:30) (67 - 86)  ABP: --  ABP(mean): --  RR: 14 (04 Jun 2019 11:52) (9 - 26)  SpO2: 100% (04 Jun 2019 11:52) (97% - 100%)    I&O's Summary    03 Jun 2019 07:01  -  04 Jun 2019 07:00  --------------------------------------------------------  IN: 400 mL / OUT: 350 mL / NET: 50 mL    04 Jun 2019 07:01  -  04 Jun 2019 12:46  --------------------------------------------------------  IN: 150 mL / OUT: 75 mL / NET: 75 mL      --------------------------------------------------------------------------------------    EXAM:    General/Neuro   GCS: 15  Exam: Normal, NAD, alert, oriented x 3, no focal deficits.     Respiratory  Exam: Extubated, on 3L NC.   Lungs clear to auscultation, Normal expansion/effort.     Cardiovascular  Exam: S1, S2.  Regular rate and rhythm.  No peripheral edema  Cardiac Rhythm: Normal Sinus Rhythm    GI  Exam: Abdomen soft, Non-tender, Non-distended.   Current Diet:  NPO      Tubes/Lines/Drains   [x] Peripheral IVs         [x] Suprapubic catheter		    Extremities  Exam: RLE in ace bandage s/p BKA, dressing in place, not saturated; L PT palpable; Extremities warm, pink, well-perfused.      Derm:  Exam: Good skin turgor, no skin breakdown.      :   Exam: Suprapubic catheter in place.         LABS  --------------------------------------------------------------------------------------  Labs:  CAPILLARY BLOOD GLUCOSE      POCT Blood Glucose.: 109 mg/dL (04 Jun 2019 12:18)  POCT Blood Glucose.: 88 mg/dL (04 Jun 2019 07:43)  POCT Blood Glucose.: 99 mg/dL (04 Jun 2019 03:30)                          7.2    14.34 )-----------( 367      ( 04 Jun 2019 12:08 )             23.0       Auto Neutrophil %: 79.7 % (06-04-19 @ 12:08)  Auto Immature Granulocyte %: 1.0 % (06-04-19 @ 12:08)  Auto Neutrophil %: 79.1 % (06-04-19 @ 05:01)  Auto Immature Granulocyte %: 1.1 % (06-04-19 @ 05:01)  Auto Neutrophil %: 85.2 % (06-03-19 @ 20:54)    06-04    133<L>  |  103  |  67<HH>  ----------------------------<  62<L>  5.1<H>   |  13<L>  |  4.6<HH>      Calcium, Total Serum: 7.8 mg/dL (06-04-19 @ 05:01)      LFTs:             5.6  | 0.4  | 15       ------------------[211     ( 04 Jun 2019 05:01 )  1.7  | x    | 14          Lipase:x      Amylase:x         Lactate, Blood: 0.7 mmol/L (06-04-19 @ 05:01)  Blood Gas Venous - Lactate: 1.4 mmoL/L (06-03-19 @ 22:34)  Lactate, Blood: 1.2 mmol/L (06-03-19 @ 20:54)      Coags:     16.20  ----< 1.41    ( 03 Jun 2019 20:54 )     38.6                      --------------------------------------------------------------------------------------    OTHER LABS    IMAGING RESULTS SICU Consultation Note    =====================================================    HPI:  59y/o M, BIBEMS on 6/3/19 after noticing R foot skin changes, discoloration, drainage, and a foul smell a couple of days ago. 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 Patient was hypotensive to the low 100s inta-op, resolved with fluids, no other acute events during the case.    PMH significant for LE weakness 2/2 lumbar compression fracture at the age of 24, resulting in paraplegia & wheelchair bound (can move LE but very weak, and has some sensation), with subsequent sacral decubiti and heel ulcers s/p debridements in the past (w/ Dr. Gooden, but has been noncompliant with f/u visits and antibiotics), PAD, suprapubic catheter, chronic pain, neuropathy, insomnia, DM2 & HTN that resolved w/ weight loss. Pt has seen Dr. Muñiz in the past for vascular procedures. He denied fevers, chills, sob, cp, admits to decreased appetite.       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  H/O hernia repair    Home Meds: Home Medications:  gabapentin 800 mg oral tablet: 1 tab(s) orally 3 times a day (27 Jul 2018 01:14)  methadone: 10 milligram(s) orally once a day (04 Jun 2019 02:46)  oxyCODONE 30 mg oral tablet: 1 tab(s) orally 4 times a day (03 Aug 2018 14:30)  Testim 1% (50 mg/5 g) transdermal gel: 1 packet(s) transdermal once a day (in the morning) (27 Jul 2018 01:14)  Valium 10 mg oral tablet: orally 2 times a day (27 Jul 2018 01:14)  Xanax 2 mg oral tablet: orally once (at bedtime) (27 Jul 2018 01:14)    ALLERGIES:  Sulfamethizole (Unknown)    Intolerances      Soc:   Advanced Directives: Presumed Full Code     ROS:    REVIEW OF SYSTEMS    [x ] A ten-point review of systems was otherwise negative except as noted.      CURRENT MEDICATIONS:   --------------------------------------------------------------------------------------  Neurologic Medications  ALPRAZolam 2 milliGRAM(s) Oral at bedtime  diazepam    Tablet 10 milliGRAM(s) Oral at bedtime  HYDROmorphone  Injectable 0.5 milliGRAM(s) IV Push every 10 minutes PRN Moderate Pain (4 - 6)  methadone    Tablet 10 milliGRAM(s) Oral daily  morphine  - Injectable 2 milliGRAM(s) IV Push every 2 hours PRN Severe Pain (7 - 10)  ondansetron Injectable 4 milliGRAM(s) IV Push every 6 hours PRN Nausea  ondansetron Injectable 4 milliGRAM(s) IV Push once PRN Nausea and/or Vomiting  oxyCODONE    IR 30 milliGRAM(s) Oral every 12 hours PRN Moderate Pain (4 - 6)    Respiratory Medications    Cardiovascular Medications    Gastrointestinal Medications  dextrose 5%. 1000 milliLiter(s) IV Continuous <Continuous>  docusate sodium 100 milliGRAM(s) Oral three times a day  lactated ringers. 1000 milliLiter(s) IV Continuous <Continuous>  senna 2 Tablet(s) Oral daily PRN Constipation    Genitourinary Medications    Hematologic/Oncologic Medications  heparin  Injectable 5000 Unit(s) SubCutaneous every 8 hours    Antimicrobial/Immunologic Medications  clindamycin IVPB 900 milliGRAM(s) IV Intermittent every 8 hours  meropenem  IVPB 500 milliGRAM(s) IV Intermittent every 12 hours    Endocrine/Metabolic Medications  dextrose 40% Gel 15 Gram(s) Oral once PRN Blood Glucose LESS THAN 70 milliGRAM(s)/deciliter  dextrose 50% Injectable 12.5 Gram(s) IV Push once  dextrose 50% Injectable 25 Gram(s) IV Push once  dextrose 50% Injectable 25 Gram(s) IV Push once  glucagon  Injectable 1 milliGRAM(s) IntraMuscular once PRN Glucose LESS THAN 70 milligrams/deciliter  insulin lispro (HumaLOG) corrective regimen sliding scale   SubCutaneous three times a day before meals    Topical/Other Medications  chlorhexidine 4% Liquid 1 Application(s) Topical <User Schedule>    --------------------------------------------------------------------------------------    VITAL SIGNS, INS/OUTS (last 24 hours):  --------------------------------------------------------------------------------------  ICU Vital Signs Last 24 Hrs  T(C): 36.9 (03 Jun 2019 22:50), Max: 37.2 (03 Jun 2019 19:52)  T(F): 98.4 (03 Jun 2019 22:50), Max: 98.9 (03 Jun 2019 19:52)  HR: 80 (04 Jun 2019 11:52) (74 - 89)  BP: 115/69 (04 Jun 2019 11:52) (87/54 - 119/71)  BP(mean): 75 (04 Jun 2019 08:30) (67 - 86)  ABP: --  ABP(mean): --  RR: 14 (04 Jun 2019 11:52) (9 - 26)  SpO2: 100% (04 Jun 2019 11:52) (97% - 100%)    I&O's Summary    03 Jun 2019 07:01  -  04 Jun 2019 07:00  --------------------------------------------------------  IN: 400 mL / OUT: 350 mL / NET: 50 mL    04 Jun 2019 07:01  -  04 Jun 2019 12:46  --------------------------------------------------------  IN: 150 mL / OUT: 75 mL / NET: 75 mL      --------------------------------------------------------------------------------------    EXAM:    General/Neuro   GCS: 15  Exam: Normal, NAD, alert, oriented x 3, no focal deficits.     Respiratory  Exam: Extubated, on 3L NC.   Lungs clear to auscultation, Normal expansion/effort.     Cardiovascular  Exam: S1, S2.  Regular rate and rhythm.  No peripheral edema  Cardiac Rhythm: Normal Sinus Rhythm    GI  Exam: Abdomen soft, Non-tender, Non-distended.   Current Diet:  NPO	    Extremities  Exam: RLE in ace bandage s/p BKA, dressing in place, not saturated; L PT palpable; Extremities warm, pink, well-perfused.      Derm:  Exam: Good skin turgor, no skin breakdown.      :   Exam: Suprapubic catheter in place.     Tubes/Lines/Drains   [x] Peripheral IVs         [x] Suprapubic catheter	      LABS  --------------------------------------------------------------------------------------  Labs:  CAPILLARY BLOOD GLUCOSE      POCT Blood Glucose.: 109 mg/dL (04 Jun 2019 12:18)  POCT Blood Glucose.: 88 mg/dL (04 Jun 2019 07:43)  POCT Blood Glucose.: 99 mg/dL (04 Jun 2019 03:30)                          7.2    14.34 )-----------( 367      ( 04 Jun 2019 12:08 )             23.0       Auto Neutrophil %: 79.7 % (06-04-19 @ 12:08)  Auto Immature Granulocyte %: 1.0 % (06-04-19 @ 12:08)  Auto Neutrophil %: 79.1 % (06-04-19 @ 05:01)  Auto Immature Granulocyte %: 1.1 % (06-04-19 @ 05:01)  Auto Neutrophil %: 85.2 % (06-03-19 @ 20:54)    06-04    133<L>  |  103  |  67<HH>  ----------------------------<  62<L>  5.1<H>   |  13<L>  |  4.6<HH>      Calcium, Total Serum: 7.8 mg/dL (06-04-19 @ 05:01)      LFTs:             5.6  | 0.4  | 15       ------------------[211     ( 04 Jun 2019 05:01 )  1.7  | x    | 14          Lipase:x      Amylase:x         Lactate, Blood: 0.7 mmol/L (06-04-19 @ 05:01)  Blood Gas Venous - Lactate: 1.4 mmoL/L (06-03-19 @ 22:34)  Lactate, Blood: 1.2 mmol/L (06-03-19 @ 20:54)      Coags:     16.20  ----< 1.41    ( 03 Jun 2019 20:54 )     38.6                      --------------------------------------------------------------------------------------    OTHER LABS    IMAGING RESULTS

## 2019-06-04 NOTE — CONSULT NOTE ADULT - ASSESSMENT
inflamed right labia--> no evidence of acute infection at this time despite CT finding air    rec: warm compresses q6h, will follow    no surgery at this time--> will I and D if progresses

## 2019-06-05 NOTE — PROGRESS NOTE ADULT - SUBJECTIVE AND OBJECTIVE BOX
Nephrology progress note    Patient is seen and examined, events over the last 24 h noted .    Allergies:  sulfamethizole (Unknown)    Hospital Medications:   MEDICATIONS  (STANDING):  ALPRAZolam 2 milliGRAM(s) Oral at bedtime  chlorhexidine 4% Liquid 1 Application(s) Topical <User Schedule>  clindamycin IVPB 900 milliGRAM(s) IV Intermittent every 8 hours  dextrose 5%. 1000 milliLiter(s) (50 mL/Hr) IV Continuous <Continuous>  dextrose 50% Injectable 12.5 Gram(s) IV Push once  dextrose 50% Injectable 25 Gram(s) IV Push once  dextrose 50% Injectable 25 Gram(s) IV Push once  diazepam    Tablet 10 milliGRAM(s) Oral two times a day  docusate sodium 100 milliGRAM(s) Oral three times a day  heparin  Injectable 5000 Unit(s) SubCutaneous every 8 hours  insulin lispro (HumaLOG) corrective regimen sliding scale   SubCutaneous three times a day before meals  meropenem  IVPB 1000 milliGRAM(s) IV Intermittent every 24 hours  methadone    Tablet 10 milliGRAM(s) Oral daily  ondansetron Injectable 4 milliGRAM(s) IV Push every 8 hours  pantoprazole    Tablet 40 milliGRAM(s) Oral before breakfast  sodium chloride 0.9% Bolus 250 milliLiter(s) IV Bolus once  sodium chloride 0.9% Bolus 1000 milliLiter(s) IV Bolus once  sodium chloride 0.9% Bolus 250 milliLiter(s) IV Bolus once  sodium chloride 0.9%. 1000 milliLiter(s) (100 mL/Hr) IV Continuous <Continuous>        VITALS:  T(F): 96.8 (19 @ 08:00), Max: 98 (19 @ 15:40)  HR: 90 (19 @ 08:15)  BP: 107/57 (19 @ 08:15)  RR: 9 (19 @ 08:15)  SpO2: 99% (19 @ 08:15)  Wt(kg): --     @ 07:01  -   @ 07:00  --------------------------------------------------------  IN: 400 mL / OUT: 350 mL / NET: 50 mL     @ 07:  -   @ 07:00  --------------------------------------------------------  IN: 3494 mL / OUT: 943 mL / NET: 2551 mL     @ 07:  -   @ 09:22  --------------------------------------------------------  IN: 100 mL / OUT: 35 mL / NET: 65 mL      Height (cm): 185.42 ( @ 16:40)  Weight (kg): 72.9 ( @ 16:40)  BMI (kg/m2): 21.2 ( @ 16:40)  BSA (m2): 1.96 ( @ 16:40)    PHYSICAL EXAM:  Constitutional: NAD  HEENT: anicteric sclera, oropharynx clear, MMM  Neck: No JVD  Respiratory: CTAB, no wheezes, rales or rhonchi  Cardiovascular: S1, S2, RRR  Gastrointestinal: BS+, soft, NT/ND  Extremities: No cyanosis or clubbing. No peripheral edema  :  No brown.   Skin: No rashes    LABS:      135  |  106  |  61<HH>  ----------------------------<  59<L>  4.6   |  17  |  4.2<HH>  Creatinine Trend: 4.2<--, 4.3<--, 4.6<--, 4.9<--  Ca    7.2<L>      2019 00:13  Phos  6.3       Mg     1.6         TPro  5.6<L>  /  Alb  1.7<L>  /  TBili  0.4  /  DBili      /  AST  15  /  ALT  14  /  AlkPhos  211<H>                            7.0    9.90  )-----------( 415      ( 2019 00:13 )             23.0       Urine Studies:  Urinalysis Basic - ( 2019 00:13 )    Color: Yellow / Appearance: Clear / S.010 / pH:   Gluc:  / Ketone: Negative  / Bili: Negative / Urobili: 0.2 mg/dL   Blood:  / Protein: 100 mg/dL / Nitrite: Negative   Leuk Esterase: Small / RBC: 1-2 /HPF / WBC 6-10 /HPF   Sq Epi:  / Non Sq Epi: Occasional /HPF / Bacteria: Few /HPF      Chloride, Random Urine: 26 ( @ 00:13)  Sodium, Random Urine: 38.0 mmoL/L ( @ 00:13)  Osmolality, Random Urine: 275 mos/kg ( @ 00:13)  Creatinine, Random Urine: 16 mg/dL ( @ 18:10)  Potassium, Random Urine: 13 mmol/L ( @ 18:10)  Sodium, Random Urine: 33.0 mmoL/L ( @ 18:10)    RADIOLOGY & ADDITIONAL STUDIES: Nephrology progress note    Patient is seen and examined, events over the last 24 h noted .  lying in bed comfortable   sp surgery yesterday     Allergies:  sulfamethizole (Unknown)    Hospital Medications:   MEDICATIONS  (STANDING):  ALPRAZolam 2 milliGRAM(s) Oral at bedtime  clindamycin IVPB 900 milliGRAM(s) IV Intermittent every 8 hours  diazepam    Tablet 10 milliGRAM(s) Oral two times a day  docusate sodium 100 milliGRAM(s) Oral three times a day  heparin  Injectable 5000 Unit(s) SubCutaneous every 8 hours  insulin lispro (HumaLOG) corrective regimen sliding scale   SubCutaneous three times a day before meals  meropenem  IVPB 1000 milliGRAM(s) IV Intermittent every 24 hours  methadone    Tablet 10 milliGRAM(s) Oral daily  ondansetron Injectable 4 milliGRAM(s) IV Push every 8 hours  pantoprazole    Tablet 40 milliGRAM(s) Oral before breakfast  sodium chloride 0.9% Bolus 250 milliLiter(s) IV Bolus once  sodium chloride 0.9% Bolus 1000 milliLiter(s) IV Bolus once  sodium chloride 0.9% Bolus 250 milliLiter(s) IV Bolus once  sodium chloride 0.9%. 1000 milliLiter(s) (100 mL/Hr) IV Continuous <Continuous>        VITALS:  T(F): 96.8 (19 @ 08:00), Max: 98 (19 @ 15:40)  HR: 90 (19 @ 08:15)  BP: 107/57 (19 @ 08:15)  RR: 9 (19 @ 08:15)  SpO2: 99% (19 @ 08:15)       @ 07:01  -   @ 07:00  --------------------------------------------------------  IN: 400 mL / OUT: 350 mL / NET: 50 mL     @ 07:01  -   @ 07:00  --------------------------------------------------------  IN: 3494 mL / OUT: 943 mL / NET: 2551 mL     @ 07:01  -   @ 09:22  --------------------------------------------------------  IN: 100 mL / OUT: 35 mL / NET: 65 mL      Height (cm): 185.42 ( @ 16:40)  Weight (kg): 72.9 ( @ 16:40)  BMI (kg/m2): 21.2 ( @ 16:40)  BSA (m2): 1.96 ( @ 16:40)    PHYSICAL EXAM:  Constitutional: NAD  HEENT: anicteric sclera, oropharynx clear, MMM  Neck: No JVD  Respiratory: CTAB, no wheezes, rales or rhonchi  Cardiovascular: S1, S2, RRR  Gastrointestinal: BS+, soft, NT/ND  Extremities: No cyanosis or clubbing. No peripheral edema right BKA   :  SPC . Positive brown   Skin: No rashes    LABS:      135  |  106  |  61<HH>  ----------------------------<  59<L>  4.6   |  17  |  4.2<HH>  Creatinine Trend: 4.2<--, 4.3<--, 4.6<--, 4.9<--  Ca    7.2<L>      2019 00:13  Phos  6.3       Mg     1.6         TPro  5.6<L>  /  Alb  1.7<L>  /  TBili  0.4  /  DBili      /  AST  15  /  ALT  14  /  AlkPhos  211<H>                            7.0    9.90  )-----------( 415      ( 2019 00:13 )             23.0       Urine Studies:  Urinalysis Basic - ( 2019 00:13 )    Color: Yellow / Appearance: Clear / S.010 / pH:   Gluc:  / Ketone: Negative  / Bili: Negative / Urobili: 0.2 mg/dL   Blood:  / Protein: 100 mg/dL / Nitrite: Negative   Leuk Esterase: Small / RBC: 1-2 /HPF / WBC 6-10 /HPF   Sq Epi:  / Non Sq Epi: Occasional /HPF / Bacteria: Few /HPF      Chloride, Random Urine: 26 ( @ 00:13)  Sodium, Random Urine: 38.0 mmoL/L ( @ 00:13)  Osmolality, Random Urine: 275 mos/kg ( @ 00:13)  Creatinine, Random Urine: 16 mg/dL ( @ 18:10)  Potassium, Random Urine: 13 mmol/L ( @ 18:10)  Sodium, Random Urine: 33.0 mmoL/L ( @ 18:10)    RADIOLOGY & ADDITIONAL STUDIES:  < from: US Retroperitoneal Complete (19 @ 15:37) >  IMPRESSION:    No hydronephrosis or renal calculus.  Bilateral echogenic kidneys compatible with medical renal disease.  Nondiagnostic evaluation of the urinary bladder which is collapsed around   a catheter.    < end of copied text >

## 2019-06-05 NOTE — PROGRESS NOTE ADULT - ASSESSMENT
ASSESSMENT:  59y/o M, w/ extensive PMH as above, s/p R guillotsedrick BKA for R foot necrotizing fasciitis.    PLAN:     Neurologic: pain control, on Morphine prn and Oxycodone prn.   Home meds resumed - Methadone  Anxiety: Xanax, Valium    Respiratory: on NC @ 3L, wean as tolerated; keep sat > 95%  F/U post-op ABG.    Cardiovascular: Hypotensive , prn NICOM boluses  a line in place for monitoring  Troponin 0.08. F/U CE x 2    Gastrointestinal/Nutrition: NPO. Will advance to clears, and AAT.  PPI. Bowel regimen.    Renal/Genitourinary: SILVINA with Cr 4.6 from 4.9, baseline unknown. F/U nephro recommendations.  Bicarb drip for acidosis. F/U ABG.  Suprapubic catheter in place. Strict I&Os.    Hematologic: Hgb 7.2 from 8.1. Up from 6.2 after 2u prbc.    Trend hgb. Transfuse if hgb < 7 as per primary team.  Hg 7.0 with hypotension, symtomatic anemia, 1uPRBC, f/u post tranfusion Hg  SQH     Infectious Disease: WBC trending down at 9,  from 21.   +ORSA  Abx: Meropenem, Clinda.  Stage 4 Sacral decubitus & b/l buttock ulcers. Wound care. Follow-up with burn (Dr. Gooden)  Pending bood cultures    Musculoskeletal: R BKA, dressing in place, not saturated.   Pulse checks Q4h. On bedrest. Can be out of bed with assistance on POD#1.    Endocrine: DM, on ISS. FS monitoring    Lines/Tubes:   2 peripheral IVs in R forearm & R shoulder  Suprapubic catheter        Disposition: continue SICU    --------------------------------------------------------------------------------------    Critical Care Diagnoses: ASSESSMENT:  59y/o M, w/ extensive PMH as above, s/p R guillotine BKA for R foot necrotizing fasciitis.    PLAN:     Neurologic: pain control, on home Methadone and Oxycodone; Morphine prn  Anxiety: Xanax, Valium  Paraplegic    Respiratory: No acute diagnosis. On room air, sat > 95%.   Incentive spirometry. Chest PT.  F/U post-op ABG.    Cardiovascular: Hypotensive , prn NICOM boluses  a line in place for monitoring  Troponin 0.08 x 2. F/U 3rd CE.  ECHO: EF 65-70%  Hx of HTN: pt no longer taking medications at home    Gastrointestinal/Nutrition: No acute diagnosis. On clears, will AAT.  PPI. Bowel regimen.    Renal/Genitourinary: SILVINA with Cr 4.6 from 4.9, baseline 1.1. F/U urine creat & elytes.  Renal U/S: b/l echogenic kidneys compatible with medical renal disease.  Suprapubic catheter in place. Strict I&Os.    Hematologic: Hgb 7.2 from 8.1. Up from 6.2 after 2u prbc.    Trend hgb. Transfuse if hgb < 7 as per primary team.  Hg 7.0 with hypotension, symptomatic anemia, 1uPRBC, f/u post transfusion Hg  SQH     Infectious Disease: Necrotizing fasciitis s/p R BKA. WBC trending down at 9,  from 21.   +ORSA  Abx: Meropenem, Clinda, Vanco.  Stage 4 Sacral decubitus & b/l buttock ulcers. Wound care. Follow-up with burn (Dr. Gooden)  Pending blood cultures    Musculoskeletal: R BKA, dressing in place, not saturated.   Pulse checks Q4h. On bedrest. Can be out of bed with assistance on POD#1 as per primary team.    Endocrine: DM, not on any medications at home. on ISS. FS monitoring    Lines/Tubes:   A-line  2 peripheral IVs in R forearm & R shoulder  Suprapubic catheter        Disposition: continue SICU    --------------------------------------------------------------------------------------    Critical Care Diagnoses:

## 2019-06-05 NOTE — PROGRESS NOTE ADULT - ASSESSMENT
58 M w/ PMH of Paraplegia 2/2 Lumbar compression Fx, sacral decubiti and heel ulcers s/p debridements,, PAD, suprapubic cath,Neuropathy, DM2 & HTN that resolved w/ weight loss, is BIBA c/o foul smell & drainage from his RLE, found to be anemic, in SILVINA, & gangrene of RLE. Renal is consulted for SILVINA.      # SILVINA w/ Metabolic Acidosis (Cr 4.9, baseline 1.1 in   8/ 2018) and electrolyte abn ;  r/o ATN   -cr improving and anion gap closed  -producing urine   - check Renal US    - cw IV fluids   - plz send urine creatinine and Urine Lytes  - avoid nephrotoxic drugs   -replete electrolytes (magnesium)  -monitor urine out put  - avoid hypotension   -renal doing of Meropenum  -no need for RRT -  -      # Microcytic Anemia  - s/p 2 U PRBCs in ER   plz check iron studies 58 M w/ PMH of Paraplegia 2/2 Lumbar compression Fx, sacral decubiti and heel ulcers s/p debridements,, PAD, suprapubic cath,Neuropathy, DM2 & HTN that resolved w/ weight loss, is BIBA c/o foul smell & drainage from his RLE, found to be anemic, in SILVINA, & gangrene of RLE. Renal is consulted for SILVINA. sp right BKA       # SILVINA w/ Metabolic Acidosis (Cr 4.9, baseline 1.1 in   8/ 2018) and electrolyte abn ;  r/o ATN   -cr improving and anion gap better   -non oliguric   - cont IVF for now Fena 7% but improving with IVF   - sono no hydro/ medical renal disease   - avoid nephrotoxic drugs   -replete electrolytes (magnesium)  -monitor urine out put  - avoid hypotension   -on Merrem / clindamycin   -no need for RRT -    # Microcytic Anemia  - s/p 2 U PRBCs in ER   plz check iron studies   follow Hb

## 2019-06-05 NOTE — PROGRESS NOTE ADULT - SUBJECTIVE AND OBJECTIVE BOX
MARGE BELLA  58y, Male  Allergy: sulfamethizole (Unknown)      CHIEF COMPLAINT: LLE pain, occluded bypass graft (2019 01:25)      INTERVAL EVENTS/HPI  - No acute events overnight  - T(F): , Max: 98 (19 @ 15:40)  - Cr uptrending   - WBC Count: 11.34 K/uL (19 @ 11:15)  - Tolerating medication    ROS  Negative except as per noted above in HPI  Denies cough  Denies diarrhea  Denies dysuria   Denies pain at any IV site     FH noncontributory    VITALS:  T(F): 96.8, Max: 98 (19 @ 15:40)  HR: 88  BP: 107/57  RR: 8Vital Signs Last 24 Hrs  T(C): 36 (2019 08:00), Max: 36.7 (2019 15:40)  T(F): 96.8 (2019 08:00), Max: 98 (2019 15:40)  HR: 88 (2019 10:00) (74 - 94)  BP: 107/57 (2019 08:15) (76/46 - 111/63)  BP(mean): 72 (2019 08:15) (55 - 75)  RR: 8 (2019 10:00) (6 - 28)  SpO2: 98% (2019 10:00) (96% - 100%)    PHYSICAL EXAM:  Gen: NAD, resting in bed  HEENT: Normocephalic, atraumatic  Neck: supple, no lymphadenopathy  CV: Regular rate & regular rhythm  Lungs: decreased BS at bases, no fremitus  Abdomen: Soft, BS present spc  Ext: Warm, well perfused, R amputation dressings   Neuro: non focal, awake  Skin: no rash, no erythema      TESTS & MEASUREMENTS:                        7.8    11.34 )-----------( 395      ( 2019 11:15 )             25.1     06-05    135  |  106  |  61<HH>  ----------------------------<  59<L>  4.6   |  17  |  4.2<HH>    Ca    7.2<L>      2019 00:13  Phos  6.3     06-05  Mg     1.6         TPro  5.6<L>  /  Alb  1.7<L>  /  TBili  0.4  /  DBili  x   /  AST  15  /  ALT  14  /  AlkPhos  211<H>      eGFR if Non African American: 15 mL/min/1.73M2 (19 @ 00:13)  eGFR if : 17 mL/min/1.73M2 (19 @ 00:13)  eGFR if : 17 mL/min/1.73M2 (19 @ 12:12)  eGFR if Non African American: 14 mL/min/1.73M2 (19 @ 12:12)    LIVER FUNCTIONS - ( 2019 05:01 )  Alb: 1.7 g/dL / Pro: 5.6 g/dL / ALK PHOS: 211 U/L / ALT: 14 U/L / AST: 15 U/L / GGT: x           Urinalysis Basic - ( 2019 00:13 )    Color: Yellow / Appearance: Clear / S.010 / pH: x  Gluc: x / Ketone: Negative  / Bili: Negative / Urobili: 0.2 mg/dL   Blood: x / Protein: 100 mg/dL / Nitrite: Negative   Leuk Esterase: Small / RBC: 1-2 /HPF / WBC 6-10 /HPF   Sq Epi: x / Non Sq Epi: Occasional /HPF / Bacteria: Few /HPF        Culture - Blood (collected 19 @ 21:50)  Source: .Blood Blood  Preliminary Report (19 @ 07:00):    No growth to date.    Culture - Blood (collected 19 @ 21:25)  Source: .Blood Blood  Preliminary Report (19 @ 07:00):    No growth to date.        Lactate, Blood: 0.4 mmol/L (19 @ 06:49)  Lactate, Blood: 0.3 mmol/L (19 @ 00:13)  Lactate, Blood: 0.7 mmol/L (19 @ 05:01)  Blood Gas Venous - Lactate: 1.4 mmoL/L (19 @ 22:34)  Lactate, Blood: 1.2 mmol/L (19 @ 20:54)      INFECTIOUS DISEASES TESTING      RADIOLOGY & ADDITIONAL TESTS:  I have personally reviewed the last Chest xray  CXR      CT      CARDIOLOGY TESTING  Transthoracic Echocardiogram:    EXAM:  2-D ECHO (TTE) COMPLETE        PROCEDURE DATE:  2019      INTERPRETATION:  REPORT:    TRANSTHORACIC ECHOCARDIOGRAM REPORT         Patient Name:   MARGE BELLA Accession #: [id number]  Medical Rec #:  [id number]   Height:      72.0 in 182.9 cm  YOB: 1960  Weight:      150.0 lb 68.04 kg  Patient Age:    58 years   BSA:         1.89 m²  Patient Gender: M          BP:          95/55 mmHg       Date of Exam:        2019 5:48:30 PM  Referring Physician: [id number] ED UNASSIGNED  Sonographer:         Kira Hawkins  Reading Physician:   Sebastian Díaz MD.    Procedure:     2D Echo/Doppler/Color Doppler Complete.  Indications:   I42.9 - Cardiomyopathy, unspecified  Diagnosis:     I42.9 - Cardiomyopathy, unspecified  Study Details: Technically good study.         Summary:   1. Left ventricular ejection fraction, by visual estimation, is 65 to   70%.   2. Technically good study.   3. Normal left ventricular size and wall thicknesses, with normal   systolic and diastolic function.   4. The mean global longitudinal strain by speckle tracking is -20.2%   which is normal.   5. Moderate mitral annular calcification.   6. LA volume Index is 31.5 ml/m² ml/m2.    PHYSICIAN INTERPRETATION:  Left Ventricle: Normal left ventricular size and wall thicknesses, with   normal systolic and diastolic function. Left ventricular ejection   fraction, by visual estimation, is 65 to 70%. The mean global   longitudinal strain by speckle tracking is -20.2% which is normal.  Right Ventricle: Normal right ventricular size and function.  Left Atrium: Normal left atrial size. LA volume Index is 31.5 ml/m² ml/m2.  Right Atrium: Normal right atrial size.  Pericardium: There is no evidence of pericardial effusion.  Mitral Valve: Structurally normal mitral valve, with normal leaflet   excursion. The mitral valve is normal in structure. There is moderate   mitral annular calcification. Mild mitral valve regurgitation is seen.  Tricuspid Valve: Structurally normal tricuspid valve, with normal leaflet   excursion. The tricuspid valve is normal in structure. Mild tricuspid   regurgitation is visualized.  Aortic Valve: Normal trileaflet aortic valve with normal opening. The   aortic valve is normal. No evidence of aortic valve regurgitation is seen.  Pulmonic Valve: Structurally normal pulmonic valve, with normal leaflet   excursion. The pulmonic valve is normal. No indication of pulmonic valve   regurgitation.  Aorta: The aortic root and ascending aorta are structurally normal, with   no evidence of dilitation.  Pulmonary Artery: The main pulmonary artery is normal in size.  Venous: The inferior vena cava was normal sized, with respiratory size   variation greater than 50%.       2D AND M-MODE MEASUREMENTS (normal ranges within parentheses):  Left Ventricle:                  Normal   Aorta/Left Atrium:               Normal  IVSd (2D):              0.99 cm (0.7-1.1) AoV Cusp Separation: 2.14 cm   (1.5-2.6)  LVPWd (2D):             1.01 cm (0.7-1.1) Left Atrium (Mmode): 4.22 cm   (1.9-4.0)  LVIDd (2D):             4.31 cm (3.4-5.7) Right Ventricle:  LVIDs (2D):             3.12 cm           RVd (2D):        2.78 cm  LV FS (2D):             27.8 %   (>25%)  Relative Wall Thickness  0.47    (<0.42)    SPECTRAL DOPPLER ANALYSIS:  LV DIASTOLIC FUNCTION:  MV Peak E: 1.06 m/s Decel Time: 191 msec  MV Peak A: 1.40 m/s  E/A Ratio: 0.76    Aortic Valve:  AoV VMax:    1.74 m/s  AoV Area, Vmax: 2.55 cm² Vmax Indx: 1.35 cm²/m²  AoV Pk Grad: 12.1 mmHg    LVOT Vmax: 1.41 m/s  LVOT VTI:  0.37 m  LVOT Diam: 2.00 cm    Mitral Valve:  MV P1/2 Time: 55.33 msec  MV Area, PHT: 3.98 cm²    Tricuspid Valve and PA/RV Systolic Pressure: TR Max Velocity: 2.63 m/s RA   Pressure: 3 mmHg RVSP/PASP: 30.7 mmHg       [id number] Sebastian Díaz MD, Electronically signed on 2019 at 10:00:57   PM              *** Final ***                    SEBASTIAN DÍAZ MD  This document has been electronically signed. 2019  5:48PM             (19 @ 17:48)  12 Lead ECG:   Ventricular Rate 80 BPM    Atrial Rate 80 BPM    P-R Interval 184 ms    QRS Duration 98 ms    Q-T Interval 400 ms    QTC Calculation(Bezet) 461 ms    P Axis 48 degrees    R Axis -22 degrees    T Axis 31 degrees    Diagnosis Line Normal sinus rhythm  Normal ECG    Confirmed by SEBASTIAN DÍAZ MD ([id number]) on 2019 3:26:26 PM (19 @ 13:45)      MEDICATIONS  ALPRAZolam 2  chlorhexidine 4% Liquid 1  clindamycin IVPB 900  dextrose 5%. 1000  dextrose 50% Injectable 12.5  dextrose 50% Injectable 25  dextrose 50% Injectable 25  diazepam    Tablet 10  docusate sodium 100  heparin  Injectable 5000  insulin lispro (HumaLOG) corrective regimen sliding scale   meropenem  IVPB 1000  methadone    Tablet 10  ondansetron Injectable 4  pantoprazole    Tablet 40  sodium chloride 0.9% Bolus 1000  sodium chloride 0.9% Bolus 250  sodium chloride 0.9%. 1000      ANTIBIOTICS:  clindamycin IVPB 900 milliGRAM(s) IV Intermittent every 8 hours  meropenem  IVPB 1000 milliGRAM(s) IV Intermittent every 24 hours

## 2019-06-05 NOTE — PROGRESS NOTE ADULT - ASSESSMENT
ASSESSMENT  58 M w/ LE weakness 2/2 lumbar compression fracture at the age of 24, resulting in paraplegia wheelchair bound (can move LE but very weak, and has some sensation), with subsequent sacral decubiti and heel ulcers s/p debridements in the past, PAD, suprapubic cath, Chronic pain, Neuropathy, Insomnia, DM2 & HTN that resolved w/ weight loss, is BIBA c/o foul smell & drainage from his RLE    PROBLEMS  #Sepsis ruled out on admission, systolic hypotension, WBC>12  Necrotizing fasciitis s/p OR 6/4 Right Below knee guillotine amputation  Pt has an acute illness which poses threat to bodily function, resolved   BCX NG  #SILVINA Cr uptrending   #Hyponatremia/Hypomagnesia    RECOMMENDATIONS  - F/u OR cultures  - Meropenem 1g q24h IV  - Clinda 900mg q8h IV   - Likely plan for 5 days post OR date of abx as clean margins     Spectra [id number]

## 2019-06-05 NOTE — PROGRESS NOTE ADULT - SUBJECTIVE AND OBJECTIVE BOX
MARGE BELLA  [id number]  58y Male    Indication for ICU admission:  Admit Date:06-04-19  ICU Date:  OR Date:    sulfamethizole (Unknown)    PAST MEDICAL & SURGICAL HISTORY:  Hypertension  Suprapubic catheter  Pressure ulcer  Diabetes  Lumbar compression fracture  S/P debridement  Toe amputation status, right  H/O hernia repair    Home Medications:  gabapentin 800 mg oral tablet: 1 tab(s) orally 3 times a day (27 Jul 2018 01:14)  methadone: 10 milligram(s) orally once a day (04 Jun 2019 02:46)  oxyCODONE 30 mg oral tablet: 1 tab(s) orally 4 times a day (03 Aug 2018 14:30)  Testim 1% (50 mg/5 g) transdermal gel: 1 packet(s) transdermal once a day (in the morning) (27 Jul 2018 01:14)  Valium 10 mg oral tablet: orally 2 times a day (27 Jul 2018 01:14)  Xanax 2 mg oral tablet: orally once (at bedtime) (27 Jul 2018 01:14)        24HRS EVENT:  OVN: bp on cuff 80-90s, with maps 60-65, dominga placed, MAPs 60-59 stat labs sent, Hg trending down 7.0, I uPRBC symptomatic anemia, blood cultures pending      Neurologic: pain control, on Morphine prn and Oxycodone prn.   Home meds resumed - Methadone  Anxiety: Xanax, Valium    Respiratory: on NC @ 3L, wean as tolerated; keep sat > 95%  ABG pending, drawn venous    Cardiovascular: Hypotensive to 100/60. Maintain normotensive  Troponin 0.08. F/U CE ___ bloodwork, ABG.    Gastrointestinal/Nutrition: NPO. Will advance to clears, and AAT.  PPI. Bowel regimen.    Renal/Genitourinary: SILVINA with Cr 4.6 baseline ~1  Bicarb drip  d/c  Suprapubic catheter in place. Strict I&Os.    Hematologic: Hgb 7.2 from 8.1. Up from 6.2 after 2u prbc.     CBC 2000 pending    Transfuse if hgb < 7 as per primary team.   HSQ.     Infectious Disease: WBC trending down 15>14>9  +ORSA  Abx: Meropenem renal dosed 1g q24 as per ID    Vanco 1g q24 1x dose, administer based on Van trough 4pm on 6/4    Stage 4 Sacral decubitus & b/l buttock ulcers. Wound care. Follow-up with burn (Dr. Gooden), consult called    Musculoskeletal: R BKA, dressing in place, not saturated.   Pulse checks Q4h. On bedrest. Can be out of bed with assistance on POD#1.    Endocrine: DM, on ISS. FS monitoring    Lines/Tubes:   2 peripheral IVs in R forearm & R shoulder  Suprapubic catheter          DVT Prophylaxis: heparin  Injectable 5000 Unit(s) SubCutaneous every 8 hours      GI Prophylaxis:PPI      ***Tubes/Lines/Drains  ***  Peripheral IV      Urinary Catheter	Yes, Suprapubic	Indication: Strict I&O    Date Placed:       Review Of Systems:  [X ] A ten-point review of systems was otherwise negative except as noted above.  [ ] Due to altered mental status/intubation, subjective information were not able to be obtained from the patient. History was obtained, to the extent possible, from review of the chart, clinical exam and collateral sources of information. MARGE BELLA  [id number]  58y Male    Indication for ICU admission:  Admit Date:06-04-19  ICU Date:  OR Date:    sulfamethizole (Unknown)    PAST MEDICAL & SURGICAL HISTORY:  Hypertension  Suprapubic catheter  Pressure ulcer  Diabetes  Lumbar compression fracture  S/P debridement  Toe amputation status, right  H/O hernia repair    Home Medications:  gabapentin 800 mg oral tablet: 1 tab(s) orally 3 times a day (27 Jul 2018 01:14)  methadone: 10 milligram(s) orally once a day (04 Jun 2019 02:46)  oxyCODONE 30 mg oral tablet: 1 tab(s) orally 4 times a day (03 Aug 2018 14:30)  Testim 1% (50 mg/5 g) transdermal gel: 1 packet(s) transdermal once a day (in the morning) (27 Jul 2018 01:14)  Valium 10 mg oral tablet: orally 2 times a day (27 Jul 2018 01:14)  Xanax 2 mg oral tablet: orally once (at bedtime) (27 Jul 2018 01:14)        24HRS EVENT:  OVN: bp on cuff 80-90s, with maps 60-65, dominga placed, MAPs 60-59 stat labs sent, Hg trending down 7.0, I uPRBC symptomatic anemia, blood cultures pending      Neurologic: pain control, on Morphine prn and Oxycodone prn.   Home meds resumed - Methadone  Anxiety: Xanax, Valium    Respiratory: on NC @ 3L, wean as tolerated; keep sat > 95%  ABG pending, drawn venous    Cardiovascular: Hypotensive to 100/60. Maintain normotensive  Troponin 0.08. F/U CE ___ bloodwork, ABG.    Gastrointestinal/Nutrition: NPO. Will advance to clears, and AAT.  PPI. Bowel regimen.    Renal/Genitourinary: SILVINA with Cr 4.6 baseline ~1  Bicarb drip  d/c  Suprapubic catheter in place. Strict I&Os.    Hematologic: Hgb 7.2 from 8.1. Up from 6.2 after 2u prbc.     CBC 2000 pending    Transfuse if hgb < 7 as per primary team.   HSQ.     Infectious Disease: WBC trending down 15>14>9  +ORSA  Abx: Meropenem renal dosed 1g q24 as per ID    Vanco 1g q24 1x dose, administer based on Van trough 4pm on 6/4    Stage 4 Sacral decubitus & b/l buttock ulcers. Wound care. Follow-up with burn (Dr. Gooden), consult called    Musculoskeletal: R BKA, dressing in place, not saturated.   Pulse checks Q4h. On bedrest. Can be out of bed with assistance on POD#1.    Endocrine: DM, on ISS. FS monitoring    Lines/Tubes:   2 peripheral IVs in R forearm & R shoulder  Suprapubic catheter          DVT Prophylaxis: heparin  Injectable 5000 Unit(s) SubCutaneous every 8 hours      GI Prophylaxis:PPI      ***Tubes/Lines/Drains  ***  Peripheral IV      Urinary Catheter	Yes, Suprapubic	Indication: Strict I&O    Date Placed:       Review Of Systems:  [X ] A ten-point review of systems was otherwise negative except as noted above.  [ ] Due to altered mental status/intubation, subjective information were not able to be obtained from the patient. History was obtained, to the extent possible, from review of the chart, clinical exam and collateral sources of information.

## 2019-06-05 NOTE — PROGRESS NOTE ADULT - SUBJECTIVE AND OBJECTIVE BOX
Procedure: Status post Guillotine amputation below knee  right        Operative Findings:  · Operative Findings	Infected gangrene right foot and ankle. Right Below knee guillotine amputation.	    Post Operative day #1    Patient resting comfortably      pmh:  Hypertension  Suprapubic catheter  Pressure ulcer  Diabetes  Lumbar compression fracture  Gangrene of right foot  Gangrene of right foot  Gas gangrene of foot  Guillotine amputation below knee  S/P debridement  Toe amputation status, right  H/O hernia repair  No significant past surgical history  INFECTION/WEAKNESS  90+  Decubitus ulcer  Anemia  SILVINA (acute kidney injury)  Sepsis    sulfamethizole (Unknown)    ALPRAZolam 2 milliGRAM(s) Oral at bedtime  chlorhexidine 4% Liquid 1 Application(s) Topical <User Schedule>  clindamycin IVPB 900 milliGRAM(s) IV Intermittent every 8 hours  dextrose 40% Gel 15 Gram(s) Oral once PRN  dextrose 5%. 1000 milliLiter(s) IV Continuous <Continuous>  dextrose 50% Injectable 12.5 Gram(s) IV Push once  dextrose 50% Injectable 25 Gram(s) IV Push once  dextrose 50% Injectable 25 Gram(s) IV Push once  diazepam    Tablet 10 milliGRAM(s) Oral two times a day  docusate sodium 100 milliGRAM(s) Oral three times a day  glucagon  Injectable 1 milliGRAM(s) IntraMuscular once PRN  heparin  Injectable 5000 Unit(s) SubCutaneous every 8 hours  insulin lispro (HumaLOG) corrective regimen sliding scale   SubCutaneous three times a day before meals  meropenem  IVPB 1000 milliGRAM(s) IV Intermittent every 24 hours  methadone    Tablet 10 milliGRAM(s) Oral daily  morphine  - Injectable 2 milliGRAM(s) IV Push every 2 hours PRN  ondansetron Injectable 4 milliGRAM(s) IV Push every 8 hours  oxyCODONE    IR 30 milliGRAM(s) Oral every 12 hours PRN  senna 2 Tablet(s) Oral daily PRN  sodium chloride 0.9% Bolus 1000 milliLiter(s) IV Bolus once  sodium chloride 0.9% Bolus 250 milliLiter(s) IV Bolus once  sodium chloride 0.9% Bolus 250 milliLiter(s) IV Bolus once  sodium chloride 0.9%. 1000 milliLiter(s) IV Continuous <Continuous>          T(C): 36.2 (19 @ 20:00), Max: 36.7 (19 @ 15:40)  HR: 82 (19 @ 00:00) (74 - 89)  BP: 87/50 (19 @ 22:30) (76/46 - 119/71)  RR: 8 (19 @ 00:00) (6 - 26)  SpO2: 99% (19 @ 00:00) (97% - 100%)    19 07:01  -  19 @ 07:00  --------------------------------------------------------  IN: 400 mL / OUT: 350 mL / NET: 50 mL    19 @ 07:01  -  19 @ 00:34  --------------------------------------------------------  IN: 2250 mL / OUT: 693 mL / NET: 1557 mL        General:Alert and oriented times 3, not in acute distress   Heart: Regular rate and rhythm, no rubs , murmurs or gallops  Lungs: Clear to auscultation bilaterally, no wheezes, rales, rhonci appreciated  Abdomen: Soft , positive bowel sounds, no tenderness, no distention, no peritoneal signs   Exremites: right bka dressing clean dry intact knee immobilzer in place,  warm extremities,  good color, no swelling, motor and sensation , pulses                   7.2    14.34 )-----------( 367      (  @ 12:08 )             23.0                8.1    15.96 )-----------( 395      (  @ 05:01 )             27.5                6.2    21.57 )-----------( 514      (  @ 20:54 )             20.7                    134   |  104   |  69                 Ca: 7.7    BMP:   ----------------------------< 87     M.3   (19 @ 12:12)             4.7    |  16    | 4.3                Ph: 5.8      LFT:     TPro: 5.6 / Alb: 1.7 / TBili: 0.4 / DBili: x / AST: 15 / ALT: 14 / AlkPhos: 211   (19 @ 05:01)          PT/INR - ( 2019 20:54 )   PT: 16.20 sec;   INR: 1.41 ratio         PTT - ( 2019 20:54 )  PTT:38.6 sec    CARDIAC MARKERS ( 2019 12:12 )  x     / 0.08 ng/mL / 13 U/L / x     / 1.7 ng/mL        Urinalysis Basic - ( 2019 16:26 )    Color: Yellow / Appearance: Turbid / S.010 / pH: x  Gluc: x / Ketone: Negative  / Bili: Negative / Urobili: 0.2 mg/dL   Blood: x / Protein: 100 mg/dL / Nitrite: Negative   Leuk Esterase: Small / RBC: 6-10 /HPF / WBC 10-25 /HPF   Sq Epi: x / Non Sq Epi: Few /HPF / Bacteria: Few /HPF        ABG - ( 2019 12:33 )  pH: 7.34  /  pCO2: 34    /  pO2: 35    / HCO3: 19    / Base Excess: -6.4  /  SaO2: 67                          Plan and assessment  Pt. 58yMale status post right bka for source control of nec fasc    -icu management   -keep knee immobilizer in place  -iv merro  and clinda  -trend wbc  -trend any fevers   -Pain managment  - Diet clears  -dvt prophylaxis      GARTH Mann   19 @ 00:34  # [id number]/ [id number]

## 2019-06-06 NOTE — DIETITIAN INITIAL EVALUATION ADULT. - OTHER INFO
RD consult received- reason unspecified, likely 2/2 multiple pressure ulcers. Pt paraplegic pt with wet gangrene/ nec fasc right foot, s/p right BKA guillotine POD#1. SILVINA/sepsis noted. NKFA. PO no more than 25% of this time as pt is agitated with decreased PO and says "don't bother" with PO supplement.

## 2019-06-06 NOTE — DIETITIAN INITIAL EVALUATION ADULT. - PT NOT SOURCE
Pt agitated at time of assessment, saying all he wants is his "medication" and not agreeable to full nutrition assessment at this time

## 2019-06-06 NOTE — PROGRESS NOTE ADULT - SUBJECTIVE AND OBJECTIVE BOX
Patient: MARGE BELLA , 58y (1960)Male     Procedure/Diagnosis: S/p right BKA guillotine  Events over 24h: No acute overnight events, patient seen and examined bedside with no active complaints.     Vitals: T(F): 97.3 (19 @ 20:00), Max: 97.3 (19 @ 20:00)  HR: 90 (19 @ 00:00)  BP: 107/57 (19 @ 08:15) (107/57 - 107/57)  RR: 10 (19 @ 00:00)  SpO2: 99% (19 @ 00:00)    In:   19 @ 07:01  -  19 @ 07:00  --------------------------------------------------------  IN: 3494 mL    19 @ 07:01  -  19 @ 06:41  --------------------------------------------------------  IN: 2350 mL      Out:   19 @ 07:01  -  19 @ 07:00  --------------------------------------------------------  OUT:    Indwelling Catheter - Suprapubic: 943 mL  Total OUT: 943 mL      19 @ 07:01  -  19 @ 06:41  --------------------------------------------------------  OUT:    Indwelling Catheter - Suprapubic: 1105 mL  Total OUT: 1105 mL        Net:   19 @ 07:01  -  19 @ 07:00  --------------------------------------------------------  NET: 2551 mL    19 @ 07:01  -  19 @ 06:41  --------------------------------------------------------  NET: 1245 mL      Diet: Diet, Consistent Carbohydrate/No Snacks (19 @ 09:19)    IV Fluids: Type: dextrose 5%. 1000 milliLiter(s) (50 mL/Hr) IV Continuous <Continuous>  magnesium sulfate  IVPB 1 Gram(s) IV Intermittent once  sodium chloride 0.9% Bolus 250 milliLiter(s) IV Bolus once    Physical Examination:  General Appearance: NAD, alert and cooperative  HEENT: NCAT, WNL  Heart: S1 and S2. No murmurs. RRR  Lungs: CTABL  Abdomen: Soft, nondistended, nontender  Extremities: peripheral pulses 2+, no peripheral edema, full ROM    Medications: [Standing]  ALPRAZolam 2 milliGRAM(s) Oral at bedtime  chlorhexidine 4% Liquid 1 Application(s) Topical <User Schedule>  clindamycin IVPB 900 milliGRAM(s) IV Intermittent every 8 hours  dextrose 5%. 1000 milliLiter(s) (50 mL/Hr) IV Continuous <Continuous>  dextrose 50% Injectable 25 Gram(s) IV Push once  diazepam    Tablet 10 milliGRAM(s) Oral two times a day  docusate sodium 100 milliGRAM(s) Oral three times a day  heparin  Injectable 5000 Unit(s) SubCutaneous every 8 hours  insulin lispro (HumaLOG) corrective regimen sliding scale   SubCutaneous three times a day before meals  magnesium sulfate  IVPB 1 Gram(s) IV Intermittent once  meropenem  IVPB 1000 milliGRAM(s) IV Intermittent every 24 hours  methadone    Tablet 10 milliGRAM(s) Oral daily  ondansetron Injectable 4 milliGRAM(s) IV Push every 8 hours  pantoprazole    Tablet 40 milliGRAM(s) Oral before breakfast  sodium chloride 0.9% Bolus 250 milliLiter(s) IV Bolus once  vancomycin  IVPB 1000 milliGRAM(s) IV Intermittent every 24 hours    DVT Prophylaxis: heparin  Injectable 5000 Unit(s) SubCutaneous every 8 hours    GI Prophylaxis: pantoprazole    Tablet 40 milliGRAM(s) Oral before breakfast    Antibiotics: clindamycin IVPB 900 milliGRAM(s) IV Intermittent every 8 hours  meropenem  IVPB 1000 milliGRAM(s) IV Intermittent every 24 hours  vancomycin  IVPB 1000 milliGRAM(s) IV Intermittent every 24 hours    Anticoagulation:   Medications:[PRN]  glucagon  Injectable 1 milliGRAM(s) IntraMuscular once PRN  morphine  - Injectable 2 milliGRAM(s) IV Push every 2 hours PRN  naloxone Injectable 0.4 milliGRAM(s) IV Push once PRN  oxyCODONE    IR 30 milliGRAM(s) Oral every 12 hours PRN  senna 2 Tablet(s) Oral daily PRN    Labs:                        7.9    12.06 )-----------( 397      ( 2019 00:30 )             25.2     06-06    137  |  108  |  55<H>  ----------------------------<  53<L>  4.7   |  15<L>  |  4.0<H>    Ca    7.3<L>      2019 00:30  Phos  6.6     06-06  Mg     1.8     06-06        PT/INR - ( 2019 00:30 )   PT: 20.10 sec;   INR: 1.76 ratio         PTT - ( 2019 00:30 )  PTT:40.4 sec  ABG - ( 2019 12:33 )  pH: 7.34  /  pCO2: 34    /  pO2: 35    / HCO3: 19    / Base Excess: -6.4  /  SaO2: 67                CARDIAC MARKERS ( 2019 11:15 )  x     / 0.08 ng/mL / 8 U/L / x     / 1.5 ng/mL  CARDIAC MARKERS ( 2019 00:13 )  x     / 0.08 ng/mL / 9 U/L / x     / 1.7 ng/mL  CARDIAC MARKERS ( 2019 12:12 )  x     / 0.08 ng/mL / 13 U/L / x     / 1.7 ng/mL      Urine/Micro:    Culture - Other (collected 2019 00:24)  Source: .Other right medial ankle culterette  Preliminary Report (2019 16:42):    Numerous Gram Negative Rods    Culture - Blood (collected 2019 21:50)  Source: .Blood Blood  Preliminary Report (2019 07:00):    No growth to date.    Culture - Other (collected 2019 21:25)  Source: .Other None  Preliminary Report (2019 12:47):    Numerous Gram Negative Rods    Few Corynebacterium species "Susceptibilities not performed"    Culture - Blood (collected 2019 21:25)  Source: .Blood Blood  Preliminary Report (2019 07:00):    No growth to date.      Urinalysis Basic - ( 2019 00:13 )    Color: Yellow / Appearance: Clear / S.010 / pH: x  Gluc: x / Ketone: Negative  / Bili: Negative / Urobili: 0.2 mg/dL   Blood: x / Protein: 100 mg/dL / Nitrite: Negative   Leuk Esterase: Small / RBC: 1-2 /HPF / WBC 6-10 /HPF   Sq Epi: x / Non Sq Epi: Occasional /HPF / Bacteria: Few /HPF

## 2019-06-06 NOTE — PROGRESS NOTE ADULT - SUBJECTIVE AND OBJECTIVE BOX
Nephrology progress note    Patient is seen and examined, events over the last 24 h noted .    Allergies:  sulfamethizole (Unknown)    Hospital Medications:   MEDICATIONS  (STANDING):  ALPRAZolam 2 milliGRAM(s) Oral at bedtime  chlorhexidine 4% Liquid 1 Application(s) Topical <User Schedule>  clindamycin IVPB 900 milliGRAM(s) IV Intermittent every 8 hours  dextrose 5%. 1000 milliLiter(s) (50 mL/Hr) IV Continuous <Continuous>  dextrose 50% Injectable 25 Gram(s) IV Push once  diazepam    Tablet 10 milliGRAM(s) Oral two times a day  docusate sodium 100 milliGRAM(s) Oral three times a day  heparin  Injectable 5000 Unit(s) SubCutaneous every 8 hours  insulin lispro (HumaLOG) corrective regimen sliding scale   SubCutaneous three times a day before meals  meropenem  IVPB 1000 milliGRAM(s) IV Intermittent every 24 hours  methadone    Tablet 10 milliGRAM(s) Oral daily  ondansetron Injectable 4 milliGRAM(s) IV Push every 8 hours  pantoprazole    Tablet 40 milliGRAM(s) Oral before breakfast  sodium chloride 0.9% Bolus 250 milliLiter(s) IV Bolus once  vancomycin  IVPB 1000 milliGRAM(s) IV Intermittent every 24 hours        VITALS:  T(F): 97.1 (19 @ 08:00), Max: 97.6 (19 @ 04:00)  HR: 88 (19 @ 08:00)  BP: --  RR: 21 (19 @ 08:00)  SpO2: 99% (19 @ 08:00)  Wt(kg): --     @ :  -   @ 07:00  --------------------------------------------------------  IN: 3494 mL / OUT: 943 mL / NET: 2551 mL     @ 07:01  -   @ 07:00  --------------------------------------------------------  IN: 2450 mL / OUT: 1335 mL / NET: 1115 mL          PHYSICAL EXAM:  Constitutional: NAD  HEENT: anicteric sclera, oropharynx clear, MMM  Neck: No JVD  Respiratory: CTAB, no wheezes, rales or rhonchi  Cardiovascular: S1, S2, RRR  Gastrointestinal: BS+, soft, NT/ND  Extremities: No cyanosis or clubbing. No peripheral edema  :  No brown.   Skin: No rashes    LABS:      137  |  108  |  55<H>  ----------------------------<  53<L>  4.7   |  15<L>  |  4.0<H>  Creatinine Trend: 4.0<--, 4.2<--, 4.3<--, 4.6<--, 4.9<--  Ca    7.3<L>      2019 00:30  Phos  6.6       Mg     1.8                                 7.9    12.06 )-----------( 397      ( 2019 00:30 )             25.2       Urine Studies:  Urinalysis Basic - ( 2019 00:13 )    Color: Yellow / Appearance: Clear / S.010 / pH:   Gluc:  / Ketone: Negative  / Bili: Negative / Urobili: 0.2 mg/dL   Blood:  / Protein: 100 mg/dL / Nitrite: Negative   Leuk Esterase: Small / RBC: 1-2 /HPF / WBC 6-10 /HPF   Sq Epi:  / Non Sq Epi: Occasional /HPF / Bacteria: Few /HPF      Protein/Creatinine Ratio Calculation: 7 Ratio ( @ 00:13)  Creatinine, Random Urine: 39 mg/dL ( @ 00:13)  Chloride, Random Urine: 26 ( @ 00:13)  Sodium, Random Urine: 38.0 mmoL/L ( @ 00:13)  Osmolality, Random Urine: 275 mos/kg ( @ 00:13)  Creatinine, Random Urine: 16 mg/dL ( @ 18:10)  Potassium, Random Urine: 13 mmol/L ( @ 18:10)  Sodium, Random Urine: 33.0 mmoL/L ( @ 18:10)    RADIOLOGY & ADDITIONAL STUDIES: Nephrology progress note    Patient is seen and examined, events over the last 24 h noted .  lethargic lying in bed   no other complaints     Allergies:  sulfamethizole (Unknown)    Hospital Medications:   MEDICATIONS  (STANDING):  ALPRAZolam 2 milliGRAM(s) Oral at bedtime  clindamycin IVPB 900 milliGRAM(s) IV Intermittent every 8 hours  dextrose 5%. 1000 milliLiter(s) (50 mL/Hr) IV Continuous <Continuous>  dextrose 50% Injectable 25 Gram(s) IV Push once  diazepam    Tablet 10 milliGRAM(s) Oral two times a day  docusate sodium 100 milliGRAM(s) Oral three times a day  heparin  Injectable 5000 Unit(s) SubCutaneous every 8 hours  insulin lispro (HumaLOG) corrective regimen sliding scale   SubCutaneous three times a day before meals  meropenem  IVPB 1000 milliGRAM(s) IV Intermittent every 24 hours  methadone    Tablet 10 milliGRAM(s) Oral daily  ondansetron Injectable 4 milliGRAM(s) IV Push every 8 hours  pantoprazole    Tablet 40 milliGRAM(s) Oral before breakfast  sodium chloride 0.9% Bolus 250 milliLiter(s) IV Bolus once  vancomycin  IVPB 1000 milliGRAM(s) IV Intermittent every 24 hours        VITALS:  T(F): 97.1 (19 @ 08:00), Max: 97.6 (19 @ 04:00)  HR: 88 (19 @ 08:00)  BP: 110/60  RR: 21 (19 @ 08:00)  SpO2: 99% (19 @ 08:00)       @ 07:  -   @ 07:00  --------------------------------------------------------  IN: 3494 mL / OUT: 943 mL / NET: 2551 mL     @ 07:01  -   @ 07:00  --------------------------------------------------------  IN: 2450 mL / OUT: 1335 mL / NET: 1115 mL          PHYSICAL EXAM:  Constitutional: lethargic   HEENT: anicteric sclera, oropharynx clear, MMM  Neck: No JVD  Respiratory: CTAB, no wheezes, rales or rhonchi  Cardiovascular: S1, S2, RRR  Gastrointestinal: BS+, soft, NT/ND  Extremities: No cyanosis or clubbing. No peripheral edema, tight BKA   :  No brown.   Skin: No rashes    LABS:      137  |  108  |  55<H>  ----------------------------<  53<L>  4.7   |  15<L>  |  4.0<H>  Creatinine Trend: 4.0<--, 4.2<--, 4.3<--, 4.6<--, 4.9<--  Ca    7.3<L>      2019 00:30  Phos  6.6       Mg     1.8                                 7.9    12.06 )-----------( 397      ( 2019 00:30 )             25.2       Urine Studies:  Urinalysis Basic - ( 2019 00:13 )    Color: Yellow / Appearance: Clear / S.010 / pH:   Gluc:  / Ketone: Negative  / Bili: Negative / Urobili: 0.2 mg/dL   Blood:  / Protein: 100 mg/dL / Nitrite: Negative   Leuk Esterase: Small / RBC: 1-2 /HPF / WBC 6-10 /HPF   Sq Epi:  / Non Sq Epi: Occasional /HPF / Bacteria: Few /HPF      Protein/Creatinine Ratio Calculation: 7 Ratio ( @ 00:13)  Creatinine, Random Urine: 39 mg/dL ( @ 00:13)  Chloride, Random Urine: 26 ( @ 00:13)  Sodium, Random Urine: 38.0 mmoL/L ( @ 00:13)  Osmolality, Random Urine: 275 mos/kg ( @ 00:13)  Creatinine, Random Urine: 16 mg/dL ( @ 18:10)  Potassium, Random Urine: 13 mmol/L ( @ 18:10)  Sodium, Random Urine: 33.0 mmoL/L ( @ 18:10)    RADIOLOGY & ADDITIONAL STUDIES:

## 2019-06-06 NOTE — PROGRESS NOTE ADULT - ASSESSMENT
ASSESSMENT:  57y/o M, w/ extensive PMH as above, s/p R guillotine BKA for R foot necrotizing fasciitis.    PLAN:     Neurologic: pain control, on home Methadone and Oxycodone; Morphine prn  Anxiety: Xanax, Valium  Paraplegic    Respiratory: No acute diagnosis. On room air, sat > 95%.   Incentive spirometry. Chest PT.  F/U post-op ABG.    Cardiovascular: Hypotensive , prn NICOM boluses  a line in place for monitoring  Troponin 0.08 x 2. F/U 3rd CE.  ECHO: EF 65-70%  Hx of HTN: pt no longer taking medications at home    Gastrointestinal/Nutrition: No acute diagnosis. On clears, will AAT.  PPI. Bowel regimen.    Renal/Genitourinary: SILVINA with Cr 4.6 from 4.9, baseline 1.1. F/U urine creat & elytes.  Renal U/S: b/l echogenic kidneys compatible with medical renal disease.  Suprapubic catheter in place. Strict I&Os.    Hematologic: Hgb 7.2 from 8.1. Up from 6.2 after 2u prbc.    Trend hgb. Transfuse if hgb < 7 as per primary team.  Hg 7.0 with hypotension, symptomatic anemia, 1uPRBC, f/u post transfusion Hg  SQH     Infectious Disease: Necrotizing fasciitis s/p R BKA. WBC trending down at 9,  from 21.   +ORSA  Abx: Meropenem, Clinda, Vanco.  Stage 4 Sacral decubitus & b/l buttock ulcers. Wound care. Follow-up with burn (Dr. Gooden)  Pending blood cultures    Musculoskeletal: R BKA, dressing in place, not saturated.   Pulse checks Q4h. On bedrest. Can be out of bed with assistance on POD#1 as per primary team.    Endocrine: DM, not on any medications at home. on ISS. FS monitoring    Lines/Tubes:   A-line  2 peripheral IVs in R forearm & R shoulder  Suprapubic catheter        Disposition: continue SICU    --------------------------------------------------------------------------------------    Critical Care Diagnoses: ASSESSMENT:  57y/o M, w/ extensive PMH as above, s/p R guillotine BKA for R foot necrotizing fasciitis.    PLAN:     Neurologic: pain control, on home Methadone and Oxycodone; Morphine prn  Anxiety: Xanax, Valium  Paraplegic    Respiratory: No acute diagnosis. On room air, sat > 95%.   Incentive spirometry. Chest PT.  F/U post-op ABG.    Cardiovascular: Hypotensive , prn NICOM boluses  a line in place for monitoring  Troponin 0.08 x 3  ECHO: EF 65-70%  Hx of HTN: pt no longer taking medications at home    Gastrointestinal/Nutrition: No acute diagnosis. On clears, will AAT.  PPI. Bowel regimen.    Renal/Genitourinary: SILVINA with Cr 4.0 baseline 1.1. F/U urine creat & elytes.  Renal U/S: b/l echogenic kidneys compatible with medical renal disease.  Suprapubic catheter in place. Strict I&Os.    Hematologic: Hgb stabilizing Transfuse if hgb < 7 as per primary team.  Hg 7.0 with hypotension, symptomatic anemia, 1uPRBC, f/u post transfusion Hg  SQH     Infectious Disease: Necrotizing fasciitis s/p R BKA. WBC trending down at 9,  from 21.   +ORSA  Abx: Meropenem, Clinda, Vanco.  Stage 4 Sacral decubitus & b/l buttock ulcers. Wound care. Follow-up with burn (Dr. Gooden)  Pending blood cultures    Musculoskeletal: R BKA, dressing in place, not saturated.   Pulse checks Q4h. On bedrest. Can be out of bed with assistance on POD#1 as per primary team.  PLAN FOR RETURN TO OR MON    Endocrine: DM, not on any medications at home. on ISS. FS monitoring    Lines/Tubes:   A-line  2 peripheral IVs in R forearm & R shoulder  Suprapubic catheter        Disposition: continue SICU    --------------------------------------------------------------------------------------    Critical Care Diagnoses: ASSESSMENT:  59y/o M, w/ extensive PMH as above, s/p R guillotine BKA for R foot necrotizing fasciitis.    PLAN:     Neurologic:   A&Ox3, in NAD, problem was refusing food, asking for milk this AM  pain control, on home Methadone and Oxycodone; Morphine prn  Anxiety: Xanax, Valium  Paraplegic    Respiratory: No acute diagnosis. On room air, sating well > 95%. Anterior lungs CTA b/l.   Refusing IS. CXR this AM no final read. CXR says low lung volumes.     Cardiovascular: History of hypotension in 90s. Getting NICOM boluses. Normotensive overnight.   A line on in place for monitoring  Troponin 0.08 x 3  ECHO: EF 65-70%  Hx of HTN: Pt no longer taking BP medications at home    Gastrointestinal/Nutrition: No acute diagnosis. Carb consistent diet.   PPI. Bowel regimen.    Renal/Genitourinary: SILVINA with Cr 4.0 baseline 1.1. Creat 4.0. Lytes: K 4.7, Mg 1.8, Phos 6.6  Renal U/S: B/l echogenic kidneys compatible with medical renal disease.  Suprapubic catheter in place. Output is 545 qshift    Hematologic: Hgb stabilizing Transfuse if hgb < 7 as per primary team.  Hg 7.9   Hg 7.0 with hypotension, symptomatic anemia, 1uPRBC, f/u post transfusion Hg  SQH     Infectious Disease: Necrotizing fasciitis s/p R BKA. WBC trending down at 9, from 21. Now going up 12.06  +ORSA  Abx: Meropenem, Clinda, Vanco.  Stage 4 Sacral decubitus & b/l buttock ulcers. Wound care. Follow-up with burn (Dr. Gooden)  Pending blood cultures    Musculoskeletal: R BKA, dressing in place, not saturated.   Pulse checks Q4h. On bedrest. Can be out of bed with assistance on POD#1 as per primary team.  PLAN FOR RETURN TO OR MON    Endocrine: DM, not on any medications at home. on ISS. FS monitoring    Lines/Tubes:   A-line  2 peripheral IVs in R forearm & R shoulder  Suprapubic catheter        Disposition: continue SICU    --------------------------------------------------------------------------------------    Critical Care Diagnoses: ASSESSMENT:  58 M w/ PMH of Paraplegia 2/2 Lumbar compression Fx, sacral decubiti and heel ulcers s/p debridements,, PAD, suprapubic cath, Neuropathy, DM2 & HTN that resolved w/ weight loss, is BIBA c/o foul smell & drainage from his RLE, found to be anemic, in SILVINA, & gangrene of RLE. Renal is consulted for SILVINA. sp right BKA       PLAN:     Neurologic:   A&Ox3, in NAD, problem was refusing food, asking for milk this AM  pain control, on home Methadone and Oxycodone; Morphine prn  Anxiety: Xanax, Valium  Paraplegic    Respiratory: No acute diagnosis. On room air, sating well > 95%. Anterior lungs CTA b/l.   Refusing IS. CXR this AM no final read. CXR says low lung volumes.     Cardiovascular: History of hypotension in 90s. Getting NICOM boluses. Normotensive overnight.   A line on in place for monitoring  Troponin 0.08 x 3  ECHO: EF 65-70%  Hx of HTN: Pt no longer taking BP medications at home    Gastrointestinal/Nutrition: No acute diagnosis. Carb consistent diet.   PPI. Bowel regimen.    Renal/Genitourinary: SILVINA with Cr 4.0 baseline 1.1. Creat 4.0. Lytes: K 4.7, Mg 1.8, Phos 6.6  Renal U/S: B/l echogenic kidneys compatible with medical renal disease.  Suprapubic catheter in place. Output is 545 qshift    Hematologic: Hgb stabilizing Transfuse if hgb < 7 as per primary team.  Hg 7.9   Hg 7.0 with hypotension, symptomatic anemia, 1uPRBC, f/u post transfusion Hg  SQH     Infectious Disease: Necrotizing fasciitis s/p R BKA. WBC trending down at 9, from 21. Now going up 12.06  +ORSA  Abx: Meropenem, Clinda, Vanco. Last vanc level 15.3  Stage 4 Sacral decubitus & b/l buttock ulcers. Wound care. Follow-up with burn (Dr. Gooden)  Pending blood cultures    Musculoskeletal: R BKA, dressing in place, not saturated.   Pulse checks Q4h. On bedrest. Can be out of bed with assistance on POD#1 as per primary team.  PLAN FOR RETURN TO OR MON    Endocrine: DM, not on any medications at home. on ISS. FS monitoring. Hypoglycemic. Last FS 67.    Lines/Tubes:   A-line  2 peripheral IVs in R forearm & R shoulder  Suprapubic catheter        Disposition: continue SICU    --------------------------------------------------------------------------------------    Critical Care Diagnoses: ASSESSMENT:  58 M w/ history of paraplegia w suprapubic catheter, sacral decubiti, PAD, and DM2 BIBA for foul smelling drainage from his RLE, found to be anemic, in SILVINA, & gangrene of RLE s/p right BKA.     PLAN:     Neurologic:   A&Ox3, in NAD.   Pain control, on home Methadone and Oxycodone; Morphine prn  Anxiety: Xanax, Valium  Paraplegic    Respiratory: No acute diagnosis. On room air, sating well > 95%. Anterior lungs CTA b/l.   Refusing IS. CXR this AM no final read. CXR says low lung volumes from 6/4.     Cardiovascular: History of hypotension in 90s. Was getting NICOM boluses. Normotensive overnight.   Troponin 0.08 x 3  ECHO: EF 65-70%  Hx of HTN: Pt no longer taking BP medications at home bc of weightloss    Gastrointestinal/Nutrition:   No acute diagnosis. Carb consistent diet. Was refusing food, but asking for milk this AM  PPI. Bowel regimen.    Renal/Genitourinary: SILVINA with Cr 4.0 baseline 1.1. Creat 4.0. Lytes: K 4.7, Mg 1.8, Phos 6.6  Renal U/S: B/l echogenic kidneys compatible with medical renal disease.  Suprapubic catheter in place. Output is 545 qshift.    Hematologic: Hgb stabilizing Transfuse if hgb < 7 as per primary team.  Hg 7.9   Hg 7.0 with hypotension, symptomatic anemia, 1uPRBC, f/u post transfusion Hg  SQH     Infectious Disease: Necrotizing fasciitis s/p R BKA. WBC trending down at 9, from 21. Now going up 12.06  +ORSA  Abx: Meropenem, Clinda, Vanco. Last vanc level 15.3  Stage 4 Sacral decubitus & b/l buttock ulcers. Wound care. Follow-up with burn (Dr. Gooden)  Cultures showing gram negative rods.     Musculoskeletal: R BKA, dressing in place, not saturated.   Pulse checks Q4h. On bedrest. Can be out of bed with assistance on POD#1 as per primary team.  PLAN FOR RETURN TO OR MON for revision.     Endocrine: DM, not on any medications at home. on ISS. FS monitoring. Hypoglycemic. Last FS 67.    Lines/Tubes:   A-line  2 peripheral IVs in R forearm & R shoulder  Suprapubic catheter        Disposition: continue SICU    --------------------------------------------------------------------------------------    Critical Care Diagnoses: ASSESSMENT:  58 M w/ history of paraplegia w suprapubic catheter, sacral decubiti, PAD, and DM2 BIBA for foul smelling drainage from his RLE, found to be anemic, in SILVINA, & gangrene of RLE s/p right BKA.     PLAN:     Neurologic:   Pain control, on home Methadone and Oxycodone; Morphine prn  Anxiety: Xanax, Valium  Paraplegic    Respiratory: No acute diagnosis. Sating well on RA.  Refusing IS this AM. AM CXR stable, low lung volumes from 6/4.     Cardiovascular:   History of hypotension in 90s. Was getting NICOM boluses.  Last one 5.6. Normotensive overnight.   Troponin 0.08 x 3  ECHO: EF 65-70%  Hx of HTN: Pt no longer taking BP medications at home after weight loss    Gastrointestinal/Nutrition:   No acute diagnosis. Carb consistent diet. Was refusing food, but asking for milk this AM. Encourage PO intake.   PPI. Bowel regimen. IVL.     Renal/Genitourinary: SILVINA with Cr 4.0, baseline 1.1 down from 4.3/4.6.   Lytes: K 4.7, Mg 1.8, Phos 6.6  Renal U/S: B/l echogenic kidneys compatible with medical renal disease.  Suprapubic catheter in place. Output is 545 q shift.    Hematologic:   Hgb stabilizing at 7/9   Transfuse if hgb < 7 as per primary team.  Hg 7.0 with hypotension, symptomatic anemia, 1uPRBC  SQH   INR elevated to 1.76. Trend LFTs ordered at 11am.     Infectious Disease:   Necrotizing fasciitis s/p R BKA. WBC was downtrending at 9 from 21, but npw going up to 12.06. +ORSA. Possible failure of source control.   Abx: Meropenem, Clinda, Vanco. Last vanc level 15.3  Stage 4 Sacral decubitus & b/l buttock ulcers. Wound care. Followed by burn (Dr. Gooden).  Culture from R med ankle showing gram negative rods.  Blood cx NGTD.     Musculoskeletal: R BKA, dressing in place, not saturated.   Pulse checks Q4h. Can be out of bed with assistance as per primary team.  PLAN FOR RETURN TO OR MON for revision.     Endocrine: DM, not on any medications at home. On ISS. FS monitoring. Hypoglycemic due to poor PO intake. Got Dextrose. Last FS 67.    Lines/Tubes:   L Rad A-line  2 peripheral IVs in R forearm & R shoulder  Suprapubic catheter    Disposition: Downgrade     --------------------------------------------------------------------------------------    Critical Care Diagnoses:

## 2019-06-06 NOTE — PROGRESS NOTE ADULT - ASSESSMENT
ASSESSMENT  58 M w/ LE weakness 2/2 lumbar compression fracture at the age of 24, resulting in paraplegia wheelchair bound (can move LE but very weak, and has some sensation), with subsequent sacral decubiti and heel ulcers s/p debridements in the past, PAD, suprapubic cath, Chronic pain, Neuropathy, Insomnia, DM2 & HTN that resolved w/ weight loss, is BIBA c/o foul smell & drainage from his RLE    PROBLEMS  #Sepsis ruled out on admission, systolic hypotension, WBC>12  Necrotizing fasciitis s/p OR 6/4 Right Below knee guillotine amputation  Pt has an acute illness which poses threat to bodily function, resolved   BCX NG  #SILVINA Cr uptrending   #Hyponatremia/Hypomagnesia    RECOMMENDATIONS  - Please add differential to CBC  - F/u OR cultures GNR  - D/C vanc as no MRSA in cultures, SILVINA, and clinda/simón has adequate GP coverage  - Meropenem 1g q24h IV  - Clinda 900mg q8h IV   - Likely plan for 5 days post OR date of abx as clean margins     Spectra [id number]

## 2019-06-06 NOTE — PROGRESS NOTE ADULT - ASSESSMENT
Assessment:  58y Male patient admitted S/P right RAFI serna    Plan:  Diet CC  Posadas,  monitor urine output  Pain control  Antibiotics  OR Monday for closure  Encourage incentive spirometer use

## 2019-06-06 NOTE — PROGRESS NOTE ADULT - ASSESSMENT
58 M w/ PMH of Paraplegia 2/2 Lumbar compression Fx, sacral decubiti and heel ulcers s/p debridements,, PAD, suprapubic cath,Neuropathy, DM2 & HTN that resolved w/ weight loss, is BIBA c/o foul smell & drainage from his RLE, found to be anemic, in SILVINA, & gangrene of RLE. Renal is consulted for SILVINA. sp right BKA       # SILVINA w/ Metabolic Acidosis ( baseline 1.1 in   8/ 2018) and electrolyte abn ;  r/o ATN   -cr improving and anion gap better   -non oliguric   - off IVF now   - Start sodium bicarb  - if poor po intake consider 1/2 NS with 75 meq of bicarb at 60 cc per hour   - sono no hydro/ medical renal disease   - avoid nephrotoxic drugs   -monitor urine out put  - avoid hypotension   -on Merrem / clindamycin   -no need for RRT     # Microcytic Anemia  - s/p 2 U PRBCs in ER   plz check iron studies   follow Hb     will follow

## 2019-06-06 NOTE — PROGRESS NOTE ADULT - SUBJECTIVE AND OBJECTIVE BOX
MARGE BELLA  [id number]  58y Male    Indication for ICU admission:  Admit Date:06-04-19  ICU Date:6/4/19  OR Date:6/4/19    sulfamethizole (Unknown)    PAST MEDICAL & SURGICAL HISTORY:  Hypertension  Suprapubic catheter  Pressure ulcer  Diabetes  Lumbar compression fracture  S/P debridement  Toe amputation status, right  H/O hernia repair    Home Medications:  gabapentin 800 mg oral tablet: 1 tab(s) orally 3 times a day (27 Jul 2018 01:14)  methadone: 10 milligram(s) orally once a day (04 Jun 2019 02:46)  oxyCODONE 30 mg oral tablet: 1 tab(s) orally 4 times a day (03 Aug 2018 14:30)  Testim 1% (50 mg/5 g) transdermal gel: 1 packet(s) transdermal once a day (in the morning) (27 Jul 2018 01:14)  Valium 10 mg oral tablet: orally 2 times a day (27 Jul 2018 01:14)  Xanax 2 mg oral tablet: orally once (at bedtime) (27 Jul 2018 01:14)        24HRS EVENT:  Neurologic: pain control, on Morphine prn and Oxycodone prn.   Home meds resumed - Methadone  Anxiety: Xanax, Valium     Respiratory: satting well on RA; keep sat > 95%    Cardiovascular: Hypotensive to 90/50s. Maintain normotensive  Troponin 0.08 x 3.    Gastrointestinal/Nutrition: Advanced to CC diet.  PPI. Bowel regimen.    Renal/Genitourinary: SILVINA, likely intrinsic with Cr  improving down to 4.0  Suprapubic catheter in place. Strict I&Os.    Hematologic: Hgb 7.8 > 7.9 last transfusion on 6/5     Transfuse if hgb < 7 as per primary team or if symptomatic anemia.   HSQ.     Infectious Disease: WBC uptrending 11 >12.06  +ORSA  Abx: Meropenem renal dosed 1g q24 as per ID    Vanco 1g q24 1x dose on 6/4. Vanco level 15.3. Started on 1g QD. Will need vanco trough before 4th dose.    Stage 4 Sacral decubitus & b/l buttock ulcers. Wound care. Follow-up with burn (Dr. Gooden), consult called    Musculoskeletal: R BKA, dressing in place, not saturated.   Pulse checks Q4h. Out of bed to chair on POD#1.  PLAN for OR on Mon for revision    Endocrine: DM w/o home regimen, on ISS. FS monitoring    Lines/Tubes:   2 peripheral IVs in R forearm & R shoulder  Suprapubic catheter      DVT Prophylaxis: heparin  Injectable 5000 Unit(s) SubCutaneous every 8 hours      GI Prophylaxis:PPI      ***Tubes/Lines/Drains  ***  Peripheral IV      Urinary Catheter	Yes, Suprapubic	Indication: Strict I&O    Date Placed:       Review Of Systems:  [X ] A ten-point review of systems was otherwise negative except as noted above.  [ ] Due to altered mental status/intubation, subjective information were not able to be obtained from the patient. History was obtained, to the extent possible, from review of the chart, clinical exam and collateral sources of information. MARGE BELLA  [id number]  58y Male    Indication for ICU admission: s/p r daphne with hypotension  Admit Date:06-04-19  ICU Date:6/4/19  OR Date:6/4/19    sulfamethizole (Unknown)    PAST MEDICAL & SURGICAL HISTORY:   Hypertension  Suprapubic catheter  Pressure ulcer  Diabetes  Lumbar compression fracture  S/P debridement  Toe amputation status, right  H/O hernia repair    Home Medications:  gabapentin 800 mg oral tablet: 1 tab(s) orally 3 times a day (27 Jul 2018 01:14)  methadone: 10 milligram(s) orally once a day (04 Jun 2019 02:46)  oxyCODONE 30 mg oral tablet: 1 tab(s) orally 4 times a day (03 Aug 2018 14:30)  Testim 1% (50 mg/5 g) transdermal gel: 1 packet(s) transdermal once a day (in the morning) (27 Jul 2018 01:14)  Valium 10 mg oral tablet: orally 2 times a day (27 Jul 2018 01:14)  Xanax 2 mg oral tablet: orally once (at bedtime) (27 Jul 2018 01:14)        24HRS EVENT:  Neurologic: pain control, on Morphine prn and Oxycodone prn.   Home meds resumed - Methadone  Anxiety: Xanax, Valium     Respiratory: satting well on RA; keep sat > 95%    Cardiovascular: Hypotensive to 90/50s. Maintain normotensive  Troponin 0.08 x 3.    Gastrointestinal/Nutrition: Advanced to CC diet.  PPI. Bowel regimen.    Renal/Genitourinary: SILVINA, likely intrinsic with Cr  improving down to 4.0  Suprapubic catheter in place. Strict I&Os.    Hematologic: Hgb 7.8 > 7.9 last transfusion on 6/5     Transfuse if hgb < 7 as per primary team or if symptomatic anemia.   HSQ.     Infectious Disease: WBC uptrending 11 >12.06  +ORSA  Abx: Meropenem renal dosed 1g q24 as per ID    Vanco 1g q24 1x dose on 6/4. Vanco level 15.3. Started on 1g QD. Will need vanco trough before 4th dose.    Stage 4 Sacral decubitus & b/l buttock ulcers. Wound care. Follow-up with burn (Dr. Gooden), consult called    Musculoskeletal: R BKA, dressing in place, not saturated.   Pulse checks Q4h. Out of bed to chair on POD#1.  PLAN for OR on Mon for revision    Endocrine: DM w/o home regimen, on ISS. FS monitoring    Lines/Tubes:   2 peripheral IVs in R forearm & R shoulder  Suprapubic catheter      DVT Prophylaxis: heparin  Injectable 5000 Unit(s) SubCutaneous every 8 hours      GI Prophylaxis:PPI      ***Tubes/Lines/Drains  ***  Peripheral IV      Urinary Catheter	Yes, Suprapubic	Indication: Strict I&O    Date Placed:       Review Of Systems:  [X ] A ten-point review of systems was otherwise negative except as noted above.  [ ] Due to altered mental status/intubation, subjective information were not able to be obtained from the patient. History was obtained, to the extent possible, from review of the chart, clinical exam and collateral sources of information. MARGE BELLA  [id number]  58y Male    Indication for ICU admission: s/p r guilkatharinaine with hypotension  Admit Date:06-04-19  ICU Date:6/4/19  OR Date:6/4/19    sulfamethizole (Unknown)    PAST MEDICAL & SURGICAL HISTORY:   Hypertension  Suprapubic catheter  Pressure ulcer  Diabetes  Lumbar compression fracture  S/P debridement  Toe amputation status, right  H/O hernia repair    Home Medications:  gabapentin 800 mg oral tablet: 1 tab(s) orally 3 times a day (27 Jul 2018 01:14)  methadone: 10 milligram(s) orally once a day (04 Jun 2019 02:46)  oxyCODONE 30 mg oral tablet: 1 tab(s) orally 4 times a day (03 Aug 2018 14:30)  Testim 1% (50 mg/5 g) transdermal gel: 1 packet(s) transdermal once a day (in the morning) (27 Jul 2018 01:14)  Valium 10 mg oral tablet: orally 2 times a day (27 Jul 2018 01:14)  Xanax 2 mg oral tablet: orally once (at bedtime) (27 Jul 2018 01:14)        24HRS EVENT:  Neurologic: pain control, on Morphine prn and Oxycodone prn.   Home meds resumed - Methadone  Anxiety: Xanax, Valium     Respiratory: satting well on RA; keep sat > 95%    Cardiovascular: Hypotensive to 90/50s. Maintain normotensive  Troponin 0.08 x 3.    Gastrointestinal/Nutrition: Advanced to CC diet.  PPI. Bowel regimen.    Renal/Genitourinary: SILVINA, likely intrinsic with Cr  improving down to 4.0  Suprapubic catheter in place. Strict I&Os.    Hematologic: Hgb 7.8 > 7.9 last transfusion on 6/5     Transfuse if hgb < 7 as per primary team or if symptomatic anemia.   HSQ.   INR rising, no AC, f/u LFTs    Infectious Disease: WBC uptrending 11 >12.06  +ORSA  Abx: Meropenem renal dosed 1g q24 as per ID    Vanco 1g q24 1x dose on 6/4. Vanco level 15.3. Started on 1g QD. Will need vanco trough before 4th dose.    Stage 4 Sacral decubitus & b/l buttock ulcers. Wound care. Follow-up with burn (Dr. Gooden), consult called    Musculoskeletal: R BKA, dressing in place, not saturated.   Pulse checks Q4h. Out of bed to chair on POD#1.  PLAN for OR on Mon for revision    Endocrine: DM w/o home regimen, on ISS. FS monitoring    Lines/Tubes:   2 peripheral IVs in R forearm & R shoulder  Suprapubic catheter      DVT Prophylaxis: heparin  Injectable 5000 Unit(s) SubCutaneous every 8 hours      GI Prophylaxis:PPI      ***Tubes/Lines/Drains  ***  Peripheral IV      Urinary Catheter	Yes, Suprapubic	Indication: Strict I&O    Date Placed:       Review Of Systems:  [X ] A ten-point review of systems was otherwise negative except as noted above.  [ ] Due to altered mental status/intubation, subjective information were not able to be obtained from the patient. History was obtained, to the extent possible, from review of the chart, clinical exam and collateral sources of information. MARGE BELLA  [id number]  58y Male    Indication for ICU admission: s/p r guilkatharinaine with hypotension  Admit Date:06-04-19  ICU Date:6/4/19  OR Date:6/4/19    sulfamethizole (Unknown)    PAST MEDICAL & SURGICAL HISTORY:   Hypertension  Suprapubic catheter  Pressure ulcer  Diabetes  Lumbar compression fracture  S/P debridement  Toe amputation status, right  H/O hernia repair    Home Medications:  gabapentin 800 mg oral tablet: 1 tab(s) orally 3 times a day (27 Jul 2018 01:14)  methadone: 10 milligram(s) orally once a day (04 Jun 2019 02:46)  oxyCODONE 30 mg oral tablet: 1 tab(s) orally 4 times a day (03 Aug 2018 14:30)  Testim 1% (50 mg/5 g) transdermal gel: 1 packet(s) transdermal once a day (in the morning) (27 Jul 2018 01:14)  Valium 10 mg oral tablet: orally 2 times a day (27 Jul 2018 01:14)  Xanax 2 mg oral tablet: orally once (at bedtime) (27 Jul 2018 01:14)        24HRS EVENT:  Neurologic: pain control, on Morphine prn and Oxycodone prn.   Home meds resumed - Methadone  Anxiety: Xanax, Valium     Respiratory: satting well on RA; keep sat > 95%    Cardiovascular: Hypotensive to 90/50s. Maintain normotensive  Troponin 0.08 x 3.    Gastrointestinal/Nutrition: Advanced to CC diet.  PPI. Bowel regimen.    Renal/Genitourinary: SILVINA, likely intrinsic with Cr  improving down to 4.0  Suprapubic catheter in place. Strict I&Os.    Hematologic: Hgb 7.8 > 7.9 last transfusion on 6/5     Transfuse if hgb < 7 as per primary team or if symptomatic anemia.   HSQ.   INR rising, no AC, f/u LFTs    Infectious Disease: WBC uptrending 11 >12.06  +ORSA  Abx: Meropenem renal dosed 1g q24 as per ID    Vanco 1g q24 1x dose on 6/4. Vanco level 15.3. Started on 1g QD. Will need vanco trough before 4th dose.    Stage 4 Sacral decubitus & b/l buttock ulcers. Wound care. Follow-up with burn (Dr. Gooden), consult called    Musculoskeletal: R BKA, dressing in place, not saturated.   Pulse checks Q4h. Out of bed to chair on POD#1.  PLAN for OR on Mon for revision    Endocrine: DM w/o home regimen, on ISS. FS monitoring    Lines/Tubes:   2 peripheral IVs in R forearm & R shoulder  Suprapubic catheter      DVT Prophylaxis: heparin  Injectable 5000 Unit(s) SubCutaneous every 8 hours      GI Prophylaxis:PPI      ***Tubes/Lines/Drains  ***  Peripheral IV      Urinary Catheter	Yes, Suprapubic	Indication: Strict I&O    Date Placed:       Review Of Systems:  [X ] A ten-point review of systems was otherwise negative except as noted above.  [ ] Due to altered mental status/intubation, subjective information were not able to be obtained from the patient. History was obtained, to the extent possible, from review of the chart, clinical exam and collateral sources of information. MARGE BELLA  [id number]  58y Male    Indication for ICU admission: s/p r guilkatharinaine with hypotension  Admit Date:19  ICU Date:19  OR Date:19    sulfamethizole (Unknown)    PAST MEDICAL & SURGICAL HISTORY:   Hypertension  Suprapubic catheter  Pressure ulcer  Diabetes  Lumbar compression fracture  S/P debridement  Toe amputation status, right  H/O hernia repair    Home Medications:  gabapentin 800 mg oral tablet: 1 tab(s) orally 3 times a day (2018 01:14)  methadone: 10 milligram(s) orally once a day (2019 02:46)  oxyCODONE 30 mg oral tablet: 1 tab(s) orally 4 times a day (03 Aug 2018 14:30)  Testim 1% (50 mg/5 g) transdermal gel: 1 packet(s) transdermal once a day (in the morning) (2018 01:14)  Valium 10 mg oral tablet: orally 2 times a day (2018 01:14)  Xanax 2 mg oral tablet: orally once (at bedtime) (2018 01:14)        24HRS EVENT:  Neurologic: pain control, on Morphine prn and Oxycodone prn.   Home meds resumed - Methadone  Anxiety: Xanax, Valium     Respiratory: satting well on RA; keep sat > 95%    Cardiovascular: Hypotensive to 90/50s. Maintain normotensive  Troponin 0.08 x 3.    Gastrointestinal/Nutrition: Advanced to CC diet.  PPI. Bowel regimen.    Renal/Genitourinary: SILVINA, likely intrinsic with Cr  improving down to 4.0  Suprapubic catheter in place. Strict I&Os.    Hematologic: Hgb 7.8 > 7.9 last transfusion on      Transfuse if hgb < 7 as per primary team or if symptomatic anemia.   HSQ.   INR rising, no AC, f/u LFTs    Infectious Disease: WBC uptrending 11 >12.06  +ORSA  Abx: Meropenem renal dosed 1g q24 as per ID    Vanco 1g q24 1x dose on . Vanco level 15.3. Started on 1g QD. Will need vanco trough before 4th dose.    Stage 4 Sacral decubitus & b/l buttock ulcers. Wound care. Follow-up with burn (Dr. Gooden), consult called    Musculoskeletal: R BKA, dressing in place, not saturated.   Pulse checks Q4h. Out of bed to chair on POD#1.  PLAN for OR on Mon for revision    Endocrine: DM w/o home regimen, on ISS. FS monitoring    Lines/Tubes:   2 peripheral IVs in R forearm & R shoulder  Suprapubic catheter      DVT Prophylaxis: heparin  Injectable 5000 Unit(s) SubCutaneous every 8 hours      GI Prophylaxis:PPI      ***Tubes/Lines/Drains  ***  Peripheral IV      Urinary Catheter	Yes, Suprapubic	Indication: Strict I&O    Date Placed:       Review Of Systems:  [X ] A ten-point review of systems was otherwise negative except as noted above.  [ ] Due to altered mental status/intubation, subjective information were not able to be obtained from the patient. History was obtained, to the extent possible, from review of the chart, clinical exam and collateral sources of information.    Daily     Daily     Diet, Consistent Carbohydrate/No Snacks (19 @ 09:19)      CURRENT MEDS:  Neurologic Medications  ALPRAZolam 2 milliGRAM(s) Oral at bedtime  diazepam    Tablet 10 milliGRAM(s) Oral two times a day  methadone    Tablet 10 milliGRAM(s) Oral daily  morphine  - Injectable 2 milliGRAM(s) IV Push every 2 hours PRN Severe Pain (7 - 10)  ondansetron Injectable 4 milliGRAM(s) IV Push every 8 hours  oxyCODONE    IR 30 milliGRAM(s) Oral every 12 hours PRN Moderate Pain (4 - 6)    Respiratory Medications    Cardiovascular Medications    Gastrointestinal Medications  dextrose 5%. 1000 milliLiter(s) IV Continuous <Continuous>  docusate sodium 100 milliGRAM(s) Oral three times a day  pantoprazole    Tablet 40 milliGRAM(s) Oral before breakfast  senna 2 Tablet(s) Oral daily PRN Constipation  sodium chloride 0.9% Bolus 250 milliLiter(s) IV Bolus once    Genitourinary Medications    Hematologic/Oncologic Medications  heparin  Injectable 5000 Unit(s) SubCutaneous every 8 hours    Antimicrobial/Immunologic Medications  clindamycin IVPB 900 milliGRAM(s) IV Intermittent every 8 hours  meropenem  IVPB 1000 milliGRAM(s) IV Intermittent every 24 hours  vancomycin  IVPB 1000 milliGRAM(s) IV Intermittent every 24 hours    Endocrine/Metabolic Medications  dextrose 50% Injectable 25 Gram(s) IV Push once  glucagon  Injectable 1 milliGRAM(s) IntraMuscular once PRN Glucose LESS THAN 70 milligrams/deciliter  insulin lispro (HumaLOG) corrective regimen sliding scale   SubCutaneous three times a day before meals    Topical/Other Medications  chlorhexidine 4% Liquid 1 Application(s) Topical <User Schedule>  naloxone Injectable 0.4 milliGRAM(s) IV Push once PRN OVERDOSE, DO NOT GIVE WITHOUT PROVIDER APPROVAL      ICU Vital Signs Last 24 Hrs  T(C): 36.4 (2019 04:00), Max: 36.4 (2019 04:00)  T(F): 97.6 (2019 04:00), Max: 97.6 (2019 04:00)  HR: 88 (2019 07:00) (86 - 94)  BP: --  BP(mean): --  ABP: 72/60 (2019 07:00) (72/60 - 142/66)  ABP(mean): 68 (2019 07:00) (58 - 90)  RR: 8 (2019 07:00) (5 - 19)  SpO2: 99% (2019 07:00) (95% - 100%)      Adult Advanced Hemodynamics Last 24 Hrs  CVP(mm Hg): --  CVP(cm H2O): --  CO: --  CI: --  PA: --  PA(mean): --  PCWP: --  SVR: --  SVRI: --  PVR: --  PVRI: --      ABG - ( 2019 12:33 )  pH, Arterial: 7.34  pH, Blood: x     /  pCO2: 34    /  pO2: 35    / HCO3: 19    / Base Excess: -6.4  /  SaO2: 67                  I&O's Summary    2019 07:  -  2019 07:00  --------------------------------------------------------  IN: 2450 mL / OUT: 1335 mL / NET: 1115 mL      I&O's Detail    2019 07:01  -  2019 07:00  --------------------------------------------------------  IN:    IV PiggyBack: 50 mL    sodium chloride 0.9%: 1900 mL    Sodium Chloride 0.9% IV Bolus: 500 mL  Total IN: 2450 mL    OUT:    Indwelling Catheter - Suprapubic: 1335 mL  Total OUT: 1335 mL    Total NET: 1115 mL          PHYSICAL EXAM:    General/Neuro  Alert & oriented x 3, no focal deficits    Lungs:  Anterior lungs clear to auscultation b/l, Normal expansion/effort.     Cardiovascular: S1, S2.  Regular rate and rhythm.  No Peripheral edema.  Cardiac Rhythm: Normal Sinus Rhythm    GI: Abdomen soft, Non-tender, Non-distended.      Extremities: Extremities warm. Right BKA in ACE wrap dressing c/d/i. Left LLE in dressing most likely due to ulcer c/d/i.     Derm: Good skin turgor, no skin breakdown.      : Suprapubic catheter in place.      CXR:       LABS:  CAPILLARY BLOOD GLUCOSE      POCT Blood Glucose.: 67 mg/dL (2019 06:51)  POCT Blood Glucose.: 62 mg/dL (2019 00:17)  POCT Blood Glucose.: 67 mg/dL (2019 17:02)  POCT Blood Glucose.: 60 mg/dL (2019 12:38)                          7.9    12. )-----------( 397      ( 2019 00:30 )             25.2       PT  20.10 ()  17.20 ()  16.20 ()    INR  1.76 ()  1.50 ()  1.41 ()    PTT  40.4 ()  41.0 ()  38.6 ()          137  |  108  |  55<H>  ----------------------------<  53<L>  4.7   |  15<L>  |  4.0<H>    Ca    7.3<L>      2019 00:30  Phos  6.6       Mg     1.8             PT/INR - ( 2019 00:30 )   PT: 20.10 sec;   INR: 1.76 ratio         PTT - ( 2019 00:30 )  PTT:40.4 sec  CARDIAC MARKERS ( 2019 11:15 )  x     / 0.08 ng/mL / 8 U/L / x     / 1.5 ng/mL  CARDIAC MARKERS ( 2019 00:13 )  x     / 0.08 ng/mL / 9 U/L / x     / 1.7 ng/mL  CARDIAC MARKERS ( 2019 12:12 )  x     / 0.08 ng/mL / 13 U/L / x     / 1.7 ng/mL      Urinalysis Basic - ( 2019 00:13 )    Color: Yellow / Appearance: Clear / S.010 / pH: x  Gluc: x / Ketone: Negative  / Bili: Negative / Urobili: 0.2 mg/dL   Blood: x / Protein: 100 mg/dL / Nitrite: Negative   Leuk Esterase: Small / RBC: 1-2 /HPF / WBC 6-10 /HPF   Sq Epi: x / Non Sq Epi: Occasional /HPF / Bacteria: Few /HPF        Culture - Other (collected 2019 00:24)  Source: .Other right medial ankle culterette  Preliminary Report (2019 16:42):    Numerous Gram Negative Rods    Culture - Blood (collected 2019 21:50)  Source: .Blood Blood  Preliminary Report (2019 07:00):    No growth to date.    Culture - Other (collected 2019 21:25)  Source: .Other None  Preliminary Report (2019 12:47):    Numerous Gram Negative Rods    Few Corynebacterium species "Susceptibilities not performed"    Culture - Blood (collected 2019 21:25)  Source: .Blood Blood  Preliminary Report (2019 07:00):    No growth to date. MARGE BELLA  [id number]  58y Male    Indication for ICU admission: s/p r guilkatharinaine with hypotension  Admit Date:19  ICU Date:19  OR Date:19    sulfamethizole (Unknown)    PAST MEDICAL & SURGICAL HISTORY:   Hypertension  Suprapubic catheter  Pressure ulcer  Diabetes  Lumbar compression fracture  S/P debridement  Toe amputation status, right  H/O hernia repair    Home Medications:  gabapentin 800 mg oral tablet: 1 tab(s) orally 3 times a day (2018 01:14)  methadone: 10 milligram(s) orally once a day (2019 02:46)  oxyCODONE 30 mg oral tablet: 1 tab(s) orally 4 times a day (03 Aug 2018 14:30)  Testim 1% (50 mg/5 g) transdermal gel: 1 packet(s) transdermal once a day (in the morning) (2018 01:14)  Valium 10 mg oral tablet: orally 2 times a day (2018 01:14)  Xanax 2 mg oral tablet: orally once (at bedtime) (2018 01:14)        24HRS EVENT:  Neurologic: pain control, on Morphine prn and Oxycodone prn.   Home meds resumed - Methadone  Anxiety: Xanax, Valium     Respiratory: satting well on RA; keep sat > 95%    Cardiovascular: Hypotensive to 90/50s. Maintain normotensive  Troponin 0.08 x 3.    Gastrointestinal/Nutrition: Advanced to CC diet.  PPI. Bowel regimen.    Renal/Genitourinary: SILVINA, likely intrinsic with Cr  improving down to 4.0  Suprapubic catheter in place. Strict I&Os.    Hematologic: Hgb 7.8 > 7.9 last transfusion on      Transfuse if hgb < 7 as per primary team or if symptomatic anemia.   HSQ.   INR rising, no AC, f/u LFTs    Infectious Disease: WBC uptrending 11 >12.06  +ORSA  Abx: Meropenem renal dosed 1g q24 as per ID    Vanco 1g q24 1x dose on . Vanco level 15.3. Started on 1g QD. Will need vanco trough before 4th dose.    Stage 4 Sacral decubitus & b/l buttock ulcers. Wound care. Follow-up with burn (Dr. Gooden), consult called    Musculoskeletal: R BKA, dressing in place, not saturated.   Pulse checks Q4h. Out of bed to chair on POD#1.  PLAN for OR on Mon for revision    Endocrine: DM w/o home regimen, on ISS. FS monitoring    Lines/Tubes:   2 peripheral IVs in R forearm & R shoulder  Suprapubic catheter      DVT Prophylaxis: heparin  Injectable 5000 Unit(s) SubCutaneous every 8 hours      GI Prophylaxis:PPI      ***Tubes/Lines/Drains  ***  Peripheral IV      Urinary Catheter	Yes, Suprapubic	Indication: Strict I&O    Date Placed:       Review Of Systems:  [X ] A ten-point review of systems was otherwise negative except as noted above.  [ ] Due to altered mental status/intubation, subjective information were not able to be obtained from the patient. History was obtained, to the extent possible, from review of the chart, clinical exam and collateral sources of information.    Daily     Daily     Diet, Consistent Carbohydrate/No Snacks (19 @ 09:19)      CURRENT MEDS:  Neurologic Medications  ALPRAZolam 2 milliGRAM(s) Oral at bedtime  diazepam    Tablet 10 milliGRAM(s) Oral two times a day  methadone    Tablet 10 milliGRAM(s) Oral daily  morphine  - Injectable 2 milliGRAM(s) IV Push every 2 hours PRN Severe Pain (7 - 10)  ondansetron Injectable 4 milliGRAM(s) IV Push every 8 hours  oxyCODONE    IR 30 milliGRAM(s) Oral every 12 hours PRN Moderate Pain (4 - 6)    Respiratory Medications    Cardiovascular Medications    Gastrointestinal Medications  dextrose 5%. 1000 milliLiter(s) IV Continuous <Continuous>  docusate sodium 100 milliGRAM(s) Oral three times a day  pantoprazole    Tablet 40 milliGRAM(s) Oral before breakfast  senna 2 Tablet(s) Oral daily PRN Constipation  sodium chloride 0.9% Bolus 250 milliLiter(s) IV Bolus once    Genitourinary Medications    Hematologic/Oncologic Medications  heparin  Injectable 5000 Unit(s) SubCutaneous every 8 hours    Antimicrobial/Immunologic Medications  clindamycin IVPB 900 milliGRAM(s) IV Intermittent every 8 hours  meropenem  IVPB 1000 milliGRAM(s) IV Intermittent every 24 hours  vancomycin  IVPB 1000 milliGRAM(s) IV Intermittent every 24 hours    Endocrine/Metabolic Medications  dextrose 50% Injectable 25 Gram(s) IV Push once  glucagon  Injectable 1 milliGRAM(s) IntraMuscular once PRN Glucose LESS THAN 70 milligrams/deciliter  insulin lispro (HumaLOG) corrective regimen sliding scale   SubCutaneous three times a day before meals    Topical/Other Medications  chlorhexidine 4% Liquid 1 Application(s) Topical <User Schedule>  naloxone Injectable 0.4 milliGRAM(s) IV Push once PRN OVERDOSE, DO NOT GIVE WITHOUT PROVIDER APPROVAL      ICU Vital Signs Last 24 Hrs  T(C): 36.4 (2019 04:00), Max: 36.4 (2019 04:00)  T(F): 97.6 (2019 04:00), Max: 97.6 (2019 04:00)  HR: 88 (2019 07:00) (86 - 94)  BP: --  BP(mean): --  ABP: 72/60 (2019 07:00) (72/60 - 142/66)  ABP(mean): 68 (2019 07:00) (58 - 90)  RR: 8 (2019 07:00) (5 - 19)  SpO2: 99% (2019 07:00) (95% - 100%)      Adult Advanced Hemodynamics Last 24 Hrs  CVP(mm Hg): --  CVP(cm H2O): --  CO: --  CI: --  PA: --  PA(mean): --  PCWP: --  SVR: --  SVRI: --  PVR: --  PVRI: --      ABG - ( 2019 12:33 )  pH, Arterial: 7.34  pH, Blood: x     /  pCO2: 34    /  pO2: 35    / HCO3: 19    / Base Excess: -6.4  /  SaO2: 67          I&O's Summary    2019 07:  -  2019 07:00  --------------------------------------------------------  IN: 2450 mL / OUT: 1335 mL / NET: 1115 mL      I&O's Detail    2019 07:01  -  2019 07:00  --------------------------------------------------------  IN:    IV PiggyBack: 50 mL    sodium chloride 0.9%: 1900 mL    Sodium Chloride 0.9% IV Bolus: 500 mL  Total IN: 2450 mL    OUT:    Indwelling Catheter - Suprapubic: 1335 mL  Total OUT: 1335 mL    Total NET: 1115 mL      PHYSICAL EXAM:    General/Neuro  Alert & oriented x 3, no focal deficits    Lungs:  Anterior lungs clear to auscultation b/l, Normal expansion/effort.     Cardiovascular: S1, S2.  Regular rate and rhythm.  No Peripheral edema.  Cardiac Rhythm: Normal Sinus Rhythm    GI: Abdomen soft, Non-tender, Non-distended.      Extremities: Extremities warm. Right BKA in ACE wrap dressing c/d/i. Left LLE in dressing most likely due to ulcer c/d/i.     Derm: Good skin turgor, no skin breakdown.      : Suprapubic catheter in place.      CXR:       LABS:  CAPILLARY BLOOD GLUCOSE      POCT Blood Glucose.: 67 mg/dL (2019 06:51)  POCT Blood Glucose.: 62 mg/dL (2019 00:17)  POCT Blood Glucose.: 67 mg/dL (2019 17:02)  POCT Blood Glucose.: 60 mg/dL (2019 12:38)                          7.9    12. )-----------( 397      ( 2019 00:30 )             25.2       PT  20.10 ()  17.20 ()  16.20 ()    INR  1.76 ()  1.50 ()  1.41 ()    PTT  40.4 ()  41.0 ()  38.6 ()          137  |  108  |  55<H>  ----------------------------<  53<L>  4.7   |  15<L>  |  4.0<H>    Ca    7.3<L>      2019 00:30  Phos  6.6       Mg     1.8             PT/INR - ( 2019 00:30 )   PT: 20.10 sec;   INR: 1.76 ratio         PTT - ( 2019 00:30 )  PTT:40.4 sec  CARDIAC MARKERS ( 2019 11:15 )  x     / 0.08 ng/mL / 8 U/L / x     / 1.5 ng/mL  CARDIAC MARKERS ( 2019 00:13 )  x     / 0.08 ng/mL / 9 U/L / x     / 1.7 ng/mL  CARDIAC MARKERS ( 2019 12:12 )  x     / 0.08 ng/mL / 13 U/L / x     / 1.7 ng/mL      Urinalysis Basic - ( 2019 00:13 )    Color: Yellow / Appearance: Clear / S.010 / pH: x  Gluc: x / Ketone: Negative  / Bili: Negative / Urobili: 0.2 mg/dL   Blood: x / Protein: 100 mg/dL / Nitrite: Negative   Leuk Esterase: Small / RBC: 1-2 /HPF / WBC 6-10 /HPF   Sq Epi: x / Non Sq Epi: Occasional /HPF / Bacteria: Few /HPF        Culture - Other (collected 2019 00:24)  Source: .Other right medial ankle culterette  Preliminary Report (2019 16:42):    Numerous Gram Negative Rods    Culture - Blood (collected 2019 21:50)  Source: .Blood Blood  Preliminary Report (2019 07:00):    No growth to date.    Culture - Other (collected 2019 21:25)  Source: .Other None  Preliminary Report (2019 12:47):    Numerous Gram Negative Rods    Few Corynebacterium species "Susceptibilities not performed"    Culture - Blood (collected 2019 21:25)  Source: .Blood Blood  Preliminary Report (2019 07:00):    No growth to date. MARGE BELLA  [id number]  58y Male    Indication for ICU admission: s/p r guilkatharinaine with hypotension  Admit Date:19  ICU Date:19  OR Date:19    sulfamethizole (Unknown)    PAST MEDICAL & SURGICAL HISTORY:   Hypertension  Suprapubic catheter  Pressure ulcer  Diabetes  Lumbar compression fracture  S/P debridement  Toe amputation status, right  H/O hernia repair    Home Medications:  gabapentin 800 mg oral tablet: 1 tab(s) orally 3 times a day (2018 01:14)  methadone: 10 milligram(s) orally once a day (2019 02:46)  oxyCODONE 30 mg oral tablet: 1 tab(s) orally 4 times a day (03 Aug 2018 14:30)  Testim 1% (50 mg/5 g) transdermal gel: 1 packet(s) transdermal once a day (in the morning) (2018 01:14)  Valium 10 mg oral tablet: orally 2 times a day (2018 01:14)  Xanax 2 mg oral tablet: orally once (at bedtime) (2018 01:14)        24HRS EVENT:  Neurologic: pain control, on Morphine prn and Oxycodone prn.   Home meds resumed - Methadone  Anxiety: Xanax, Valium     Respiratory: satting well on RA; keep sat > 95%    Cardiovascular: Hypotensive to 90/50s. Maintain normotensive  Troponin 0.08 x 3.    Gastrointestinal/Nutrition: Advanced to CC diet.  PPI. Bowel regimen.    Renal/Genitourinary: SILVINA, likely intrinsic with Cr  improving down to 4.0  Suprapubic catheter in place. Strict I&Os.    Hematologic: Hgb 7.8 > 7.9 last transfusion on      Transfuse if hgb < 7 as per primary team or if symptomatic anemia.   HSQ.   INR rising, no AC, f/u LFTs    Infectious Disease: WBC uptrending 11 >12.06  +ORSA  Abx: Meropenem renal dosed 1g q24 as per ID    Vanco 1g q24 1x dose on . Vanco level 15.3. Started on 1g QD. Will need vanco trough before 4th dose.    Stage 4 Sacral decubitus & b/l buttock ulcers. Wound care. Follow-up with burn (Dr. Gooden), consult called    Musculoskeletal: R BKA, dressing in place, not saturated.   Pulse checks Q4h. Out of bed to chair on POD#1.  PLAN for OR on Mon for revision    Endocrine: DM w/o home regimen, on ISS. FS monitoring    Lines/Tubes:   2 peripheral IVs in R forearm & R shoulder  Suprapubic catheter      DVT Prophylaxis: heparin  Injectable 5000 Unit(s) SubCutaneous every 8 hours      GI Prophylaxis:PPI      ***Tubes/Lines/Drains  ***  Peripheral IV      Urinary Catheter	Yes, Suprapubic	Indication: Strict I&O    Date Placed:       Review Of Systems:  [X ] A ten-point review of systems was otherwise negative except as noted above.  [ ] Due to altered mental status/intubation, subjective information were not able to be obtained from the patient. History was obtained, to the extent possible, from review of the chart, clinical exam and collateral sources of information.    Daily     Daily     Diet, Consistent Carbohydrate/No Snacks (19 @ 09:19)      CURRENT MEDS:  Neurologic Medications  ALPRAZolam 2 milliGRAM(s) Oral at bedtime  diazepam    Tablet 10 milliGRAM(s) Oral two times a day  methadone    Tablet 10 milliGRAM(s) Oral daily  morphine  - Injectable 2 milliGRAM(s) IV Push every 2 hours PRN Severe Pain (7 - 10)  ondansetron Injectable 4 milliGRAM(s) IV Push every 8 hours  oxyCODONE    IR 30 milliGRAM(s) Oral every 12 hours PRN Moderate Pain (4 - 6)    Respiratory Medications    Cardiovascular Medications    Gastrointestinal Medications  dextrose 5%. 1000 milliLiter(s) IV Continuous <Continuous>  docusate sodium 100 milliGRAM(s) Oral three times a day  pantoprazole    Tablet 40 milliGRAM(s) Oral before breakfast  senna 2 Tablet(s) Oral daily PRN Constipation  sodium chloride 0.9% Bolus 250 milliLiter(s) IV Bolus once    Genitourinary Medications    Hematologic/Oncologic Medications  heparin  Injectable 5000 Unit(s) SubCutaneous every 8 hours    Antimicrobial/Immunologic Medications  clindamycin IVPB 900 milliGRAM(s) IV Intermittent every 8 hours  meropenem  IVPB 1000 milliGRAM(s) IV Intermittent every 24 hours  vancomycin  IVPB 1000 milliGRAM(s) IV Intermittent every 24 hours    Endocrine/Metabolic Medications  dextrose 50% Injectable 25 Gram(s) IV Push once  glucagon  Injectable 1 milliGRAM(s) IntraMuscular once PRN Glucose LESS THAN 70 milligrams/deciliter  insulin lispro (HumaLOG) corrective regimen sliding scale   SubCutaneous three times a day before meals    Topical/Other Medications  chlorhexidine 4% Liquid 1 Application(s) Topical <User Schedule>  naloxone Injectable 0.4 milliGRAM(s) IV Push once PRN OVERDOSE, DO NOT GIVE WITHOUT PROVIDER APPROVAL      ICU Vital Signs Last 24 Hrs  T(C): 36.4 (2019 04:00), Max: 36.4 (2019 04:00)  T(F): 97.6 (2019 04:00), Max: 97.6 (2019 04:00)  HR: 88 (2019 07:00) (86 - 94)  BP: --  BP(mean): --  ABP: 72/60 (2019 07:00) (72/60 - 142/66)  ABP(mean): 68 (2019 07:00) (58 - 90)  RR: 8 (2019 07:00) (5 - 19)  SpO2: 99% (2019 07:00) (95% - 100%)      Adult Advanced Hemodynamics Last 24 Hrs  CVP(mm Hg): --  CVP(cm H2O): --  CO: --  CI: --  PA: --  PA(mean): --  PCWP: --  SVR: --  SVRI: --  PVR: --  PVRI: --      ABG - ( 2019 12:33 )  pH, Arterial: 7.34  pH, Blood: x     /  pCO2: 34    /  pO2: 35    / HCO3: 19    / Base Excess: -6.4  /  SaO2: 67          I&O's Summary    2019 07:  -  2019 07:00  --------------------------------------------------------  IN: 2450 mL / OUT: 1335 mL / NET: 1115 mL      I&O's Detail    2019 07:01  -  2019 07:00  --------------------------------------------------------  IN:    IV PiggyBack: 50 mL    sodium chloride 0.9%: 1900 mL    Sodium Chloride 0.9% IV Bolus: 500 mL  Total IN: 2450 mL    OUT:    Indwelling Catheter - Suprapubic: 1335 mL  Total OUT: 1335 mL    Total NET: 1115 mL      PHYSICAL EXAM:    General/Neuro  Alert & oriented x 3, no focal deficits    Lungs:  Anterior lungs clear to auscultation b/l, Normal expansion/effort.     Cardiovascular: S1, S2.  Regular rate and rhythm.  No Peripheral edema.  Cardiac Rhythm: Normal Sinus Rhythm    GI: Abdomen soft, Non-tender, Non-distended.      Extremities: Extremities warm. Right BKA in ACE wrap, dressing c/d/i. Left LLE in dressing most likely due to ulcer, c/d/i.     Derm: Good skin turgor.  Sacral decubiti.     : Suprapubic catheter in place.      CXR: 6/4 Decreased lung volumes      LABS:  CAPILLARY BLOOD GLUCOSE      POCT Blood Glucose.: 67 mg/dL (2019 06:51)  POCT Blood Glucose.: 62 mg/dL (2019 00:17)  POCT Blood Glucose.: 67 mg/dL (2019 17:02)  POCT Blood Glucose.: 60 mg/dL (2019 12:38)                          7.9    12. )-----------( 397      ( 2019 00:30 )             25.2       PT  20.10 ()  17.20 ()  16.20 ()    INR  1.76 ()  1.50 ()  1.41 ()    PTT  40.4 ()  41.0 ()  38.6 ()          137  |  108  |  55<H>  ----------------------------<  53<L>  4.7   |  15<L>  |  4.0<H>    Ca    7.3<L>      2019 00:30  Phos  6.6       Mg     1.8             PT/INR - ( 2019 00:30 )   PT: 20.10 sec;   INR: 1.76 ratio         PTT - ( 2019 00:30 )  PTT:40.4 sec  CARDIAC MARKERS ( 2019 11:15 )  x     / 0.08 ng/mL / 8 U/L / x     / 1.5 ng/mL  CARDIAC MARKERS ( 2019 00:13 )  x     / 0.08 ng/mL / 9 U/L / x     / 1.7 ng/mL  CARDIAC MARKERS ( 2019 12:12 )  x     / 0.08 ng/mL / 13 U/L / x     / 1.7 ng/mL      Urinalysis Basic - ( 2019 00:13 )    Color: Yellow / Appearance: Clear / S.010 / pH: x  Gluc: x / Ketone: Negative  / Bili: Negative / Urobili: 0.2 mg/dL   Blood: x / Protein: 100 mg/dL / Nitrite: Negative   Leuk Esterase: Small / RBC: 1-2 /HPF / WBC 6-10 /HPF   Sq Epi: x / Non Sq Epi: Occasional /HPF / Bacteria: Few /HPF        Culture - Other (collected 2019 00:24)  Source: .Other right medial ankle culterette  Preliminary Report (2019 16:42):    Numerous Gram Negative Rods    Culture - Blood (collected 2019 21:50)  Source: .Blood Blood  Preliminary Report (2019 07:00):    No growth to date.    Culture - Other (collected 2019 21:25)  Source: .Other None  Preliminary Report (2019 12:47):    Numerous Gram Negative Rods    Few Corynebacterium species "Susceptibilities not performed"    Culture - Blood (collected 2019 21:25)  Source: .Blood Blood  Preliminary Report (2019 07:00):    No growth to date.

## 2019-06-06 NOTE — DIETITIAN INITIAL EVALUATION ADULT. - ENERGY NEEDS
Calories:  2190 kcals/day (30 kcals/kg ABW) - multiple PU, paraplegic  Protein: 73-87 /day (1-1.2 g/kg ABW)- wounds and sepsis noted, however renal fx poor at this time (SILVINA/ATN?), once renal function improves will increase needs  Fluids: per ICU team

## 2019-06-06 NOTE — PROGRESS NOTE ADULT - SUBJECTIVE AND OBJECTIVE BOX
MARGE BELLA  58y, Male  Allergy: sulfamethizole (Unknown)      CHIEF COMPLAINT: LLE pain, occluded bypass graft (2019 01:25)      INTERVAL EVENTS/HPI  - No acute events overnight  - T(F): , Max: 97.6 (19 @ 04:00)  - Denies any worsening symptoms  - Tolerating medication  - WBC Count: 12.06 K/uL (19 @ 00:30)      ROS  Negative except as per noted above in HPI  Denies cough  Denies diarrhea  Denies dysuria   Denies pain at any IV site     FH noncontributory    VITALS:  T(F): 97.1, Max: 97.6 (19 @ 04:00)  HR: 88  BP: --  RR: 21Vital Signs Last 24 Hrs  T(C): 36.2 (2019 08:00), Max: 36.4 (2019 04:00)  T(F): 97.1 (2019 08:00), Max: 97.6 (2019 04:00)  HR: 88 (2019 08:00) (86 - 94)  BP: --  BP(mean): --  RR: 21 (2019 08:00) (5 - 21)  SpO2: 99% (2019 08:00) (95% - 100%)    PHYSICAL EXAM:  Gen: NAD, resting in bed  HEENT: Normocephalic, atraumatic  Neck: supple, no lymphadenopathy  CV: Regular rate & regular rhythm  Lungs: decreased BS at bases, no fremitus  Abdomen: Soft, BS present spc  Ext: Warm, well perfused, R amputation dressings   Neuro: non focal, awake  Skin: no rash, no erythema        TESTS & MEASUREMENTS:                        7.9    12.06 )-----------( 397      ( 2019 00:30 )             25.2     -    137  |  108  |  55<H>  ----------------------------<  53<L>  4.7   |  15<L>  |  4.0<H>    Ca    7.3<L>      2019 00:30  Phos  6.6       Mg     1.8           eGFR if Non African American: 16 mL/min/1.73M2 (19 @ 00:30)  eGFR if : 18 mL/min/1.73M2 (19 @ 00:30)      Urinalysis Basic - ( 2019 00:13 )    Color: Yellow / Appearance: Clear / S.010 / pH: x  Gluc: x / Ketone: Negative  / Bili: Negative / Urobili: 0.2 mg/dL   Blood: x / Protein: 100 mg/dL / Nitrite: Negative   Leuk Esterase: Small / RBC: 1-2 /HPF / WBC 6-10 /HPF   Sq Epi: x / Non Sq Epi: Occasional /HPF / Bacteria: Few /HPF        Culture - Other (collected 19 @ 00:24)  Source: .Other right medial ankle culterette  Preliminary Report (19 @ 16:42):    Numerous Gram Negative Rods    Culture - Blood (collected 19 @ 21:50)  Source: .Blood Blood  Preliminary Report (19 @ 07:00):    No growth to date.    Culture - Other (collected 19 @ 21:25)  Source: .Other None  Preliminary Report (19 @ 12:47):    Numerous Gram Negative Rods    Few Corynebacterium species "Susceptibilities not performed"    Culture - Blood (collected 19 @ 21:25)  Source: .Blood Blood  Preliminary Report (19 @ 07:00):    No growth to date.        Lactate, Blood: 0.4 mmol/L (19 @ 06:49)  Lactate, Blood: 0.3 mmol/L (19 @ 00:13)  Lactate, Blood: 0.7 mmol/L (19 @ 05:01)  Blood Gas Venous - Lactate: 1.4 mmoL/L (19 @ 22:34)  Lactate, Blood: 1.2 mmol/L (19 @ 20:54)      INFECTIOUS DISEASES TESTING      RADIOLOGY & ADDITIONAL TESTS:  I have personally reviewed the last Chest xray  CXR      CT      CARDIOLOGY TESTING  Transthoracic Echocardiogram:    EXAM:  2-D ECHO (TTE) COMPLETE        PROCEDURE DATE:  2019      INTERPRETATION:  REPORT:    TRANSTHORACIC ECHOCARDIOGRAM REPORT         Patient Name:   MARGE BELLA Accession #: [id number]  Medical Rec #:  [id number]   Height:      72.0 in 182.9 cm  YOB: 1960  Weight:      150.0 lb 68.04 kg  Patient Age:    58 years   BSA:         1.89 m²  Patient Gender: M          BP:          95/55 mmHg       Date of Exam:        2019 5:48:30 PM  Referring Physician: [id number] ED UNASSIGNED  Sonographer:         Kira Hawkins  Reading Physician:   Sebastian Díaz MD.    Procedure:     2D Echo/Doppler/Color Doppler Complete.  Indications:   I42.9 - Cardiomyopathy, unspecified  Diagnosis:     I42.9 - Cardiomyopathy, unspecified  Study Details: Technically good study.         Summary:   1. Left ventricular ejection fraction, by visual estimation, is 65 to   70%.   2. Technically good study.   3. Normal left ventricular size and wall thicknesses, with normal   systolic and diastolic function.   4. The mean global longitudinal strain by speckle tracking is -20.2%   which is normal.   5. Moderate mitral annular calcification.   6. LA volume Index is 31.5 ml/m² ml/m2.    PHYSICIAN INTERPRETATION:  Left Ventricle: Normal left ventricular size and wall thicknesses, with   normal systolic and diastolic function. Left ventricular ejection   fraction, by visual estimation, is 65 to 70%. The mean global   longitudinal strain by speckle tracking is -20.2% which is normal.  Right Ventricle: Normal right ventricular size and function.  Left Atrium: Normal left atrial size. LA volume Index is 31.5 ml/m² ml/m2.  Right Atrium: Normal right atrial size.  Pericardium: There is no evidence of pericardial effusion.  Mitral Valve: Structurally normal mitral valve, with normal leaflet   excursion. The mitral valve is normal in structure. There is moderate   mitral annular calcification. Mild mitral valve regurgitation is seen.  Tricuspid Valve: Structurally normal tricuspid valve, with normal leaflet   excursion. The tricuspid valve is normal in structure. Mild tricuspid   regurgitation is visualized.  Aortic Valve: Normal trileaflet aortic valve with normal opening. The   aortic valve is normal. No evidence of aortic valve regurgitation is seen.  Pulmonic Valve: Structurally normal pulmonic valve, with normal leaflet   excursion. The pulmonic valve is normal. No indication of pulmonic valve   regurgitation.  Aorta: The aortic root and ascending aorta are structurally normal, with   no evidence of dilitation.  Pulmonary Artery: The main pulmonary artery is normal in size.  Venous: The inferior vena cava was normal sized, with respiratory size   variation greater than 50%.       2D AND M-MODE MEASUREMENTS (normal ranges within parentheses):  Left Ventricle:                  Normal   Aorta/Left Atrium:               Normal  IVSd (2D):              0.99 cm (0.7-1.1) AoV Cusp Separation: 2.14 cm   (1.5-2.6)  LVPWd (2D):             1.01 cm (0.7-1.1) Left Atrium (Mmode): 4.22 cm   (1.9-4.0)  LVIDd (2D):             4.31 cm (3.4-5.7) Right Ventricle:  LVIDs (2D):             3.12 cm           RVd (2D):        2.78 cm  LV FS (2D):             27.8 %   (>25%)  Relative Wall Thickness  0.47    (<0.42)    SPECTRAL DOPPLER ANALYSIS:  LV DIASTOLIC FUNCTION:  MV Peak E: 1.06 m/s Decel Time: 191 msec  MV Peak A: 1.40 m/s  E/A Ratio: 0.76    Aortic Valve:  AoV VMax:    1.74 m/s  AoV Area, Vmax: 2.55 cm² Vmax Indx: 1.35 cm²/m²  AoV Pk Grad: 12.1 mmHg    LVOT Vmax: 1.41 m/s  LVOT VTI:  0.37 m  LVOT Diam: 2.00 cm    Mitral Valve:  MV P1/2 Time: 55.33 msec  MV Area, PHT: 3.98 cm²    Tricuspid Valve and PA/RV Systolic Pressure: TR Max Velocity: 2.63 m/s RA   Pressure: 3 mmHg RVSP/PASP: 30.7 mmHg       [id number] Sebastian Díaz MD, Electronically signed on 2019 at 10:00:57   PM              *** Final ***                    SEBASTIAN DÍAZ MD  This document has been electronically signed. 2019  5:48PM             (19 @ 17:48)  12 Lead ECG:   Ventricular Rate 80 BPM    Atrial Rate 80 BPM    P-R Interval 184 ms    QRS Duration 98 ms    Q-T Interval 400 ms    QTC Calculation(Bezet) 461 ms    P Axis 48 degrees    R Axis -22 degrees    T Axis 31 degrees    Diagnosis Line Normal sinus rhythm  Normal ECG    Confirmed by SEBASTIAN DÍAZ MD ([id number]) on 2019 3:26:26 PM (19 @ 13:45)      MEDICATIONS  ALPRAZolam 2  chlorhexidine 4% Liquid 1  clindamycin IVPB 900  dextrose 5%. 1000  dextrose 50% Injectable 25  diazepam    Tablet 10  docusate sodium 100  heparin  Injectable 5000  insulin lispro (HumaLOG) corrective regimen sliding scale   meropenem  IVPB 1000  methadone    Tablet 10  pantoprazole    Tablet 40  sodium chloride 0.9% Bolus 250  vancomycin  IVPB 1000      ANTIBIOTICS:  clindamycin IVPB 900 milliGRAM(s) IV Intermittent every 8 hours  meropenem  IVPB 1000 milliGRAM(s) IV Intermittent every 24 hours  vancomycin  IVPB 1000 milliGRAM(s) IV Intermittent every 24 hours

## 2019-06-06 NOTE — CHART NOTE - NSCHARTNOTEFT_GEN_A_CORE
HPI: 58 M w/ history of paraplegia w suprapubic catheter, sacral decubiti followed by Dr. Gooden, PAD, and DM2 BIBA for foul smelling drainage from his RLE, found to be anemic, in SILVINA, & gangrene of RLE s/p right BKA.  Admitted to SICU because of intraop hypotension, sepsis and Q1 vascular checks.     PLAN:     Neurologic:   A&Ox3. No focal deficits. Paraplegic.  Pain control, on home Methadone and Oxycodone; Morphine prn  Anxiety: Xanax, Valium    Respiratory:   Anterior chest CTA b/l.   No acute diagnosis. Sating well on RA.  Refusing IS this AM. AM CXR stable, low lung volumes from 6/4.     Cardiovascular:   History of hypotension in 90s. Was getting NICOM boluses.  Last NICOM 5.6. Normotensive overnight.   Troponin 0.08 x 3  ECHO: EF 65-70%  Hx of HTN: Pt no longer taking BP medications at home after weight loss    Gastrointestinal/Nutrition:   No acute diagnosis. Carb consistent diet. Was refusing food, but asking for milk this AM. Encourage PO intake.   PPI. Bowel regimen. IVL.     Renal/Genitourinary: SILVINA with Cr 4.0, baseline 1.1 down from 4.3/4.6.   Lytes: K 4.7, Mg 1.8, Phos 6.6  Renal U/S: B/l echogenic kidneys compatible with medical renal disease.  Suprapubic catheter in place. Output is 545 q shift.    Hematologic:   Hgb stabilizing at 7.9   Transfuse if hgb < 7 as per primary team.  Total PRBC transfusion in SICU is 1 PRBC  SQH   INR elevated to 1.76. Trend LFTs ordered at 11am.     Infectious Disease:   Necrotizing fasciitis s/p R BKA. WBC was downtrending at 9 from 21, but now going up to 12.06 +ORSA. Possible failure of source control.   Abx: Meropenem, Clinda, Vanco. Last vanc level 15.3  Stage 4 Sacral decubitus & b/l buttock ulcers. Wound care. Followed by burn (Dr. Gooden).  Culture from R med ankle showing gram negative rods.  Blood cx NGTD.     Musculoskeletal: R BKA, dressing in place, not saturated.   Pulse checks Q4h. Can be out of bed with assistance as per primary team.  PLAN FOR RETURN TO OR MON for revision.     Endocrine: DM, not on any medications at home. On ISS. FS monitoring. Hypoglycemic due to poor PO intake. Got Dextrose. Last FS 67.    Lines/Tubes:   2 peripheral IVs in R forearm & R shoulder  Suprapubic catheter    Disposition: Downgrade HPI: 58 M w/ history of paraplegia w suprapubic catheter, sacral decubiti followed by Dr. Gooden, PAD, and DM2 BIBA for foul smelling drainage from his RLE, found to be anemic, in SILVINA, & gangrene of RLE s/p right BKA.  Admitted to SICU because of intraop hypotension, sepsis and Q1 vascular checks.     ASSESSMENT/PLAN:     Neurologic:   A&Ox3. No focal deficits. Paraplegic.  Pain control, on home Methadone and Oxycodone; Morphine prn  Anxiety: Xanax, Valium    Respiratory:   Anterior chest CTA b/l.   No acute diagnosis. Sating well on RA.  Refusing IS this AM. AM CXR stable, low lung volumes from 6/4.     Cardiovascular:   History of hypotension in 90s. Was getting NICOM boluses.  Last NICOM 5.6. Normotensive overnight.   Troponin 0.08 x 3  ECHO: EF 65-70%  Hx of HTN: Pt no longer taking BP medications at home after weight loss    Gastrointestinal/Nutrition:   No acute diagnosis. Carb consistent diet. Was refusing food, but asking for milk this AM. Encourage PO intake.   PPI. Bowel regimen. IVL.     Renal/Genitourinary: SILVINA with Cr 4.0, baseline 1.1 down from 4.3/4.6.   Lytes: K 4.7, Mg 1.8, Phos 6.6  Renal U/S: B/l echogenic kidneys compatible with medical renal disease.  Suprapubic catheter in place. Output is 545 q shift.    Hematologic:   Hgb stabilizing at 7.9   Transfuse if hgb < 7 as per primary team.  Total PRBC transfusion in SICU is 1 PRBC  SQH   INR elevated to 1.76. Trend LFTs ordered at 11am.     Infectious Disease:   Necrotizing fasciitis s/p R BKA. WBC was downtrending at 9 from 21, but now going up to 12.06 +ORSA. Possible failure of source control.   Abx: Meropenem, Clinda, Vanco. Last vanc level 15.3  Stage 4 Sacral decubitus & b/l buttock ulcers. Wound care. Followed by burn (Dr. Gooden).  Culture from R med ankle showing gram negative rods.  Blood cx NGTD.     Musculoskeletal: R BKA, dressing in place, not saturated.   Pulse checks Q4h. Can be out of bed with assistance as per primary team.  PLAN FOR RETURN TO OR MON for revision.     Endocrine: DM, not on any medications at home. On ISS. FS monitoring. Hypoglycemic due to poor PO intake. Got Dextrose. Last FS 67.    Lines/Tubes:   2 peripheral IVs in R forearm & R shoulder  Suprapubic catheter    Disposition: Downgrade    Signed out to: Roya HPI: 58 M w/ history of paraplegia w suprapubic catheter, sacral decubiti followed by Dr. Gooden, PAD, and DM2 BIBA for foul smelling drainage from his RLE, found to be anemic, in SILVINA, & gangrene of RLE s/p right BKA.  Admitted to SICU because of intraop hypotension, sepsis and Q1 vascular checks.     ASSESSMENT/PLAN:     Neurologic:   A&Ox3. No focal deficits. Paraplegic.  Pain control, on home Methadone and Oxycodone; Morphine prn  Anxiety: Xanax, Valium    Respiratory:   Anterior chest CTA b/l.   No acute diagnosis. Sating well on RA.  Refusing IS this AM. AM CXR stable, low lung volumes from 6/4.     Cardiovascular:   History of hypotension in 90s. Was getting NICOM boluses.  Last NICOM 5.6. Normotensive overnight.   Troponin 0.08 x 3  ECHO: EF 65-70%  Hx of HTN: Pt no longer taking BP medications at home after weight loss    Gastrointestinal/Nutrition:   No acute diagnosis. Carb consistent diet. Was refusing food, but asking for milk this AM. Encourage PO intake.   PPI. Bowel regimen. IVL.     Renal/Genitourinary: SILVINA with Cr 4.0, baseline 1.1 down from 4.3/4.6.   Lytes: K 4.7, Mg 1.8, Phos 6.6  Renal U/S: B/l echogenic kidneys compatible with medical renal disease.  Suprapubic catheter in place. Output is 545 q shift.    Hematologic:   Hgb stabilizing at 7.9   Transfuse if hgb < 7 as per primary team.  Total PRBC transfusion in SICU is 1 PRBC  SQH   INR elevated to 1.76. LFT @ 10:13 am---Alk Phos 189, AST 12, ALT 9, indirect bili <0.0    Infectious Disease:   Necrotizing fasciitis s/p R BKA. WBC was downtrending at 9 from 21, but now going up to 12.06 +ORSA. Possible failure of source control.   Abx: Meropenem, Clinda, Vanco. Last vanc level 15.3  Stage 4 Sacral decubitus & b/l buttock ulcers. Wound care. Followed by burn (Dr. Gooden).  Culture from R med ankle showing gram negative rods.  Blood cx NGTD.     Musculoskeletal: R BKA, dressing in place, not saturated.   Pulse checks Q4h. Can be out of bed with assistance as per primary team.  PLAN FOR RETURN TO OR MON for revision.     Endocrine: DM, not on any medications at home. On ISS. FS monitoring. Hypoglycemic due to poor PO intake. Got Dextrose. Last FS 78.    Lines/Tubes:   2 peripheral IVs in R forearm & R shoulder  Suprapubic catheter    Disposition: Downgrade    Signed out to: Roya HPI: 58 M w/ history of paraplegia w suprapubic catheter, sacral decubiti followed by Dr. Gooden, PAD, and DM2 BIBA for foul smelling drainage from his RLE, found to be anemic, in SILVINA, & gangrene of RLE s/p right BKA.  Admitted to SICU because of intraop hypotension, sepsis and Q1 vascular checks.     ASSESSMENT/PLAN:     Neurologic:   A&Ox3. No focal deficits. Paraplegic.  Pain control, on home Methadone and Oxycodone; Morphine prn  Anxiety: Xanax, Valium    Respiratory:   Anterior chest CTA b/l.   No acute diagnosis. Sating well on RA.  Refusing IS this AM. AM CXR stable, low lung volumes from 6/4.     Cardiovascular:   History of hypotension in 90s. Was getting NICOM boluses.  Last NICOM 5.6. Normotensive overnight.   Troponin 0.08 x 3  ECHO: EF 65-70%  Hx of HTN: Pt no longer taking BP medications at home after weight loss    Gastrointestinal/Nutrition:   No acute diagnosis. Carb consistent diet. Was refusing food, but asking for milk this AM. Encourage PO intake.   PPI. Bowel regimen. IVL.     Renal/Genitourinary: SILVINA with Cr 4.0, baseline 1.1 down from 4.3/4.6.   Lytes: K 4.7, Mg 1.8, Phos 6.6  Renal U/S: B/l echogenic kidneys compatible with medical renal disease.  Suprapubic catheter in place. Output is 545 q shift.    Hematologic:   Hgb stabilizing at 7.9   Transfuse if hgb < 7 as per primary team.  Total PRBC transfusion in SICU is 1 PRBC  SQH   INR elevated to 1.76. LFT @ 10:13 am---Alk Phos 189, AST 12, ALT 9, indirect bili <0.0    Infectious Disease:   Necrotizing fasciitis s/p R BKA. WBC was downtrending at 9 from 21, but now going up to 12.06 +ORSA. Possible failure of source control.   Abx: Meropenem, Clinda, Vanco. Last vanc level 15.3. Vanc level due at 20:00 tonight.  Stage 4 Sacral decubitus & b/l buttock ulcers. Wound care. Followed by burn (Dr. Gooden).  Culture from R med ankle showing gram negative rods.  Blood cx NGTD.     Musculoskeletal: R BKA, dressing in place, not saturated.   Pulse checks Q4h. Can be out of bed with assistance as per primary team.  PLAN FOR RETURN TO OR MON for revision.     Endocrine: DM, not on any medications at home. On ISS. FS monitoring. Hypoglycemic due to poor PO intake. Got Dextrose. Last FS 78.    Lines/Tubes:   2 peripheral IVs in R forearm & R shoulder  Suprapubic catheter    Disposition: Downgrade    Signed out to: Roya Updated on 6/7/2019    HPI: 58 M w/ history of paraplegia w suprapubic catheter, sacral decubiti followed by Dr. Gooden, PAD, and DM2 BIBA for foul smelling drainage from his RLE, found to be anemic, in SILVINA, & gangrene of RLE s/p right BKA.  Admitted to SICU because of intraop hypotension, sepsis and Q1 vascular checks.     ASSESSMENT/PLAN:     Neurologic:   A&Ox3. No focal deficits. Paraplegic.  Pain control, on home Methadone and Oxycodone; Morphine prn  Anxiety: Xanax, Valium    Respiratory:   Anterior chest CTA b/l.   No acute diagnosis. Sating well on RA.  CXR stable, low lung volumes from 6/4, no acute dx.    Cardiovascular:   Periodic hypotension related to pain medication. He has been consistently NICOM negative. Holding PM Valium prevents hypotension.   SBP stable in low 100s.   Troponin 0.08 x 3  ECHO: EF 65-70%  Hx of HTN: Pt no longer taking BP medications at home after weight loss    Gastrointestinal/Nutrition:   No acute diagnosis. Carb consistent diet. Encourage PO intake. Last BM 6/6 overnight.   Nutrition supplemented with Glucerna.   PPI. Bowel regimen. IVL.     Renal/Genitourinary: SILVINA with Cr 4.1, baseline 1.1 down from 4.3/4.6.   Lytes: K 4.9, Mg 1.8, Phos 6.7  Renal U/S: B/l echogenic kidneys compatible with medical renal disease.  Suprapubic catheter in place. Output is 50-75cc/hr. Suprapubic catheter found to be leaking this AM but pt refused catheter change. He says it usu stays in 4-6 wks and he last catheterized himself 3 wks ago. Urology was made aware, restarted home Oxybutynin 5mg QD.    Hematologic:   Hgb stabilized at 7.7, 7.9, 7.8   Transfuse if hgb < 7 as per primary team.  Total PRBC transfusion in SICU is 1 PRBC  SQH   INR elevated to 1.76 but LFTs WNL     Infectious Disease:   Necrotizing fasciitis s/p R BKA. WBC was downtrending 21 -> 9. Now uptrending 11.34 -> 12.06 -> 13.16 (failure of source control vs infected IV line)   Abx: Meropenem, Clinda  (Vanco was d/c-ed as per ID)  B/c culture from R med ankle grew E. Coli susceptible to Hesham. Blood cx NGTD.   Stage 4 Sacral decubitus & b/l buttock ulcers. Wound care. Followed by burn (Dr. Gooden).    Musculoskeletal: R BKA, dressing in place, not saturated.   RLE dressing change qshift by nurse, last done 6/7 in AM (Xeroform, wet to dry, Kerlex)  Pulse checks Q4h. Can be out of bed with assistance as per primary team.  PLAN FOR RETURN TO OR MON for revision.     Endocrine: DM, not on any medications at home. On ISS. FS monitoring. Hypoglycemic due to poor PO intake.  May need PO juice or Dextrose bolus. Last .     Lines/Tubes:   Peripheral IVs in R forearm. Plan to d/c R should IV b/c phlebitis. Plan to place midline.   Suprapubic catheter    Disposition: Downgrade    Signed out to: Updated on 6/7/2019    HPI: 58 M w/ history of paraplegia w suprapubic catheter, sacral decubiti followed by Dr. Gooden, PAD, and DM2 BIBA for foul smelling drainage from his RLE, found to be anemic, in SILVINA, & gangrene of RLE s/p right BKA.  Admitted to SICU because of intraop hypotension, sepsis and Q1 vascular checks.     ASSESSMENT/PLAN:     Neurologic:   A&Ox3. No focal deficits. Paraplegic.  Pain control, on home Methadone and Oxycodone; Morphine prn  Anxiety: Xanax, Valium    Respiratory:   Anterior chest CTA b/l.   No acute diagnosis. Sating well on RA.  CXR stable, low lung volumes from 6/4, no acute dx.    Cardiovascular:   Periodic hypotension related to pain medication. He has been consistently NICOM negative. Holding PM Valium prevents hypotension.   SBP stable in low 100s.   Troponin 0.08 x 3  ECHO: EF 65-70%  Hx of HTN: Pt no longer taking BP medications at home after weight loss    Gastrointestinal/Nutrition:   No acute diagnosis. Carb consistent diet. Encourage PO intake. Last BM 6/6 overnight.   Nutrition supplemented with Glucerna.   PPI. Bowel regimen. IVL.     Renal/Genitourinary: SILVINA with Cr 4.1, baseline 1.1 down from 4.3/4.6.   Lytes: K 4.9, Mg 1.8, Phos 6.7  Renal U/S: B/l echogenic kidneys compatible with medical renal disease.  Suprapubic catheter in place. Output is 50-75cc/hr. Suprapubic catheter found to be leaking this AM but pt refused catheter change. He says it usu stays in 4-6 wks and he last catheterized himself 3 wks ago. Urology was made aware, restarted home Oxybutynin 5mg QD.    Hematologic:   Hgb stabilized at 7.7, 7.9, 7.8   Transfuse if hgb < 7 as per primary team.  Total PRBC transfusion in SICU is 1 PRBC  SQH   INR elevated to 1.76 but LFTs WNL     Infectious Disease:   Necrotizing fasciitis s/p R BKA. WBC was downtrending 21 -> 9. Now uptrending 11.34 -> 12.06 -> 13.16 (failure of source control vs infected IV line)   Abx: Meropenem, Clinda  (Vanco was d/c-ed as per ID)  B/c culture from R med ankle grew E. Coli susceptible to Hesham. Blood cx NGTD.   Stage 4 Sacral decubitus & b/l buttock ulcers. Wound care. Followed by burn (Dr. Gooden).    Musculoskeletal: R BKA, dressing in place, not saturated.   RLE dressing change qshift by nurse, last done 6/7 in AM (Xeroform, wet to dry, Kerlex)  Pulse checks Q4h. Can be out of bed with assistance as per primary team.  PLAN FOR RETURN TO OR MON for revision.     Endocrine: DM, not on any medications at home. On ISS. FS monitoring. Hypoglycemic due to poor PO intake.  May need PO juice or Dextrose bolus. Last .     Lines/Tubes:   Peripheral IVs in R forearm. Plan to d/c R should IV b/c phlebitis. Plan to place midline.   Suprapubic catheter    Disposition: Downgrade    Signed out to: Ludin Updated on 6/7/2019    HPI: 58 M w/ history of paraplegia w suprapubic catheter, sacral decubiti followed by Dr. Gooden, PAD, and DM2 BIBA for foul smelling drainage from his RLE, found to be anemic, in SILVINA, & gangrene of RLE s/p right BKA.  Admitted to SICU because of intraop hypotension, sepsis and Q1 vascular checks.     ASSESSMENT/PLAN:     Neurologic:   A&Ox3. No focal deficits. Paraplegic.  Pain control, on home Methadone and Oxycodone; Morphine prn  Anxiety: Xanax, Valium    Respiratory:   Anterior chest CTA b/l.   No acute diagnosis. Sating well on RA.  CXR stable, low lung volumes from 6/4, no acute dx.    Cardiovascular:   Periodic hypotension related to pain medication. He has been consistently NICOM negative. Holding PM Valium prevents hypotension.   SBP stable in low 100s.   Troponin 0.08 x 3  ECHO: EF 65-70%  Hx of HTN: Pt no longer taking BP medications at home after weight loss    Gastrointestinal/Nutrition:   No acute diagnosis. Carb consistent diet. Encourage PO intake. Last BM 6/6 overnight.   Nutrition supplemented with Glucerna.   PPI. Bowel regimen. IVL.     Renal/Genitourinary: SILVINA with Cr 4.1, baseline 1.1 down from 4.3/4.6.   Lytes: K 4.9, Mg 1.8, Phos 6.7  Renal U/S: B/l echogenic kidneys compatible with medical renal disease.  Suprapubic catheter in place. Output is 50-75cc/hr. Suprapubic catheter found to be leaking this AM but pt refused catheter change. He says it usu stays in 4-6 wks and he last catheterized himself 3 wks ago. Urology was made aware, restarted home Oxybutynin 5mg QD.    Hematologic:   Hgb stabilized at 7.7, 7.9, 7.8   Transfuse if hgb < 7 as per primary team.  Total PRBC transfusion in SICU is 1 PRBC  SQH   INR elevated to 1.76 but LFTs WNL     Infectious Disease:   Necrotizing fasciitis s/p R BKA. WBC was downtrending 21 -> 9. Now uptrending 11.34 -> 12.06 -> 13.16 (failure of source control vs infected IV line)   Abx: Meropenem, Clinda  (Vanco was d/c-ed as per ID)  B/c culture from R med ankle grew E. Coli susceptible to Hesham. Blood cx NGTD.   Stage 4 Sacral decubitus & b/l buttock ulcers. Wound care. Followed by burn (Dr. Gooden).    Musculoskeletal: R BKA, dressing in place, not saturated.   RLE dressing change qshift by nurse, last done 6/7 in AM (Xeroform, wet to dry, Kerlex)  Pulse checks Q4h. Can be out of bed with assistance as per primary team.  PLAN FOR RETURN TO OR MON for revision.     Endocrine: DM, not on any medications at home. On ISS. FS monitoring. Hypoglycemic due to poor PO intake.  May need PO juice or Dextrose bolus. Last .     Lines/Tubes:   Peripheral IVs in R forearm. Plan to d/c R should IV b/c phlebitis.  Midline could not be placed after several attempts.    Suprapubic catheter    Disposition: Downgrade    Signed out to: Ludin Updated on 6/7/2019    HPI: 58 M w/ history of paraplegia w suprapubic catheter, sacral decubiti followed by Dr. Gooden, PAD, and DM2 BIBA for foul smelling drainage from his RLE, found to be anemic, in SILVINA, & gangrene of RLE s/p right BKA.  Admitted to SICU because of intraop hypotension, sepsis and Q1 vascular checks.     ASSESSMENT/PLAN:     Neurologic:   A&Ox3. No focal deficits. Paraplegic.  Pain control, on home Methadone and Oxycodone; Morphine prn  Anxiety: Xanax, Valium    Respiratory:   Anterior chest CTA b/l.   No acute diagnosis. Sating well on RA.  CXR stable, low lung volumes from 6/4, no acute dx.    Cardiovascular:   Periodic hypotension related to pain medication. He has been consistently NICOM negative. Holding PM Valium prevents hypotension.   SBP stable in low 100s.   Troponin 0.08 x 3  ECHO: EF 65-70%  Hx of HTN: Pt no longer taking BP medications at home after weight loss    Gastrointestinal/Nutrition:   No acute diagnosis. Carb consistent diet. Encourage PO intake. Last BM 6/6 overnight.   Nutrition supplemented with Glucerna.   PPI. Bowel regimen. IVL.     Renal/Genitourinary: SILVINA with Cr 4.1, baseline 1.1 down from 4.3/4.6.   Lytes: K 4.9, Mg 1.8, Phos 6.7  Renal U/S: B/l echogenic kidneys compatible with medical renal disease.  Suprapubic catheter in place. Output is 50-75cc/hr. Suprapubic catheter found to be leaking this AM but pt refused catheter change. He says it usu stays in 4-6 wks and he last catheterized himself 3 wks ago. Urology was made aware, restarted home Oxybutynin 5mg QD.    Hematologic:   Hgb stabilized at 7.7, 7.9, 7.8   Transfuse if hgb < 7 as per primary team.  Total PRBC transfusion in SICU is 1 PRBC  SQH   INR elevated to 1.76 but LFTs WNL     Infectious Disease:   Necrotizing fasciitis s/p R BKA. WBC was downtrending 21 -> 9. Now uptrending 11.34 -> 12.06 -> 13.16 (failure of source control vs infected IV line)   Abx: Meropenem, Clinda  (Vanco was d/c-ed as per ID)  B/c culture from R med ankle grew E. Coli susceptible to Hesham. Blood cx NGTD.   Stage 4 Sacral decubitus & b/l buttock ulcers. Wound care. Followed by burn (Dr. Gooden).    Musculoskeletal: R BKA, dressing in place, not saturated.   RLE dressing change qshift by nurse, last done 6/7 in AM (Xeroform, wet to dry, Kerlex)  Pulse checks Q4h. Can be out of bed with assistance as per primary team.  PLAN FOR RETURN TO OR MON for revision.     Endocrine: DM, not on any medications at home. On ISS. FS monitoring. Hypoglycemic due to poor PO intake.  May need PO juice or Dextrose bolus. Last .     Lines/Tubes:   Peripheral IVs in R forearm and R should IV (phlebitis).  Midline attempted x3 but failed. Ordered IR PICC.    Suprapubic catheter    Disposition: Downgrade    Signed out to: Ludin

## 2019-06-07 NOTE — CHART NOTE - NSCHARTNOTEFT_GEN_A_CORE
59 y/o male with lumbar compression fracture at age 24 resulting in paraplegia. Has had a chronic SPT cath since then which he changes regularly himself every 4-6 weeks, He follows with Dr. Wilkes. Last changed 3 weeks ago. Called by ICU staff to evaluate catheter for leaking. Pt reports catheter has been draining well and he has no issues, he has had these episodes of leaking which usually resolve on their own. Refused to have catheter replaced, saying it is functioning well and does not need to be.     Gen: NAD, awake/alert  Abd: Soft NT/ND, + chronic SPT draining clear yellow urine    Plan:  - Pt on Oxybutynin at home- restart   - Monitor UO  - Replace cath every 4-6 weeks

## 2019-06-07 NOTE — MEDICAL STUDENT PROGRESS NOTE(EDUCATION) - SUBJECTIVE AND OBJECTIVE BOX
Procedure/Diagnosis: S/p right BKA daphne    Patient is a 57 yo male with pmh of lumbar compression fracture at the age of 24, resulting in parapalegia/ wheelchair bound with subsequent sacral decubiti and heel ulcers s/p debridements in the past and currently( managed by Dr. Gooden), PAD, suprapubic cath, chronic pain, neuropathy, and DM2 that is admitted with a diagnosis of right LE wet gangrene s/p BKA on .  Today is hospital day 4 and POD # 4.     Events over 24h: The patient was seen and examined and reports he feels mildly improved from yesterday. He states he has been eating, and drinking. He is making urine through catheter, wound dressing is dry, clean, and no oozing, blood, or pus observed on he dressing. LE immobilizer is in place. Per SICU note, pt had episode of hypotension 2/2 to pain medications that resolved and pt to be potentially downgraded today.     Vitals: T(F): 97.3 (19 @ 20:00), Max: 97.3 (19 @ 20:00)  HR: 90 (19 @ 00:00)  BP: 107/57 (19 @ 08:15) (107/57 - 107/57)  RR: 10 (19 @ 00:00)  SpO2: 99% (19 @ 00:00)      24 OUT:    Indwelling Catheter - Suprapubic: 1255 mL      Diet: Diet, Consistent Carbohydrate/No Snacks (19 @ 09:19)    IV Fluids: Type: dextrose 5%. 1000 milliLiter(s) (50 mL/Hr) IV Continuous <Continuous>  magnesium sulfate  IVPB 1 Gram(s) IV Intermittent once  sodium chloride 0.9% Bolus 250 milliLiter(s) IV Bolus once    Physical Examination:  General Appearance: NAD, alert and cooperative  HEENT: NCAT, WNL  Heart: S1 and S2. No murmurs. RRR  Lungs: CTABL  Abdomen: Soft, nondistended, nontender  Extremities: pallor noted,peripheral pulses 2+, no peripheral edema    Medications: [Standing]  ALPRAZolam 2 milliGRAM(s) Oral at bedtime  chlorhexidine 4% Liquid 1 Application(s) Topical <User Schedule>  clindamycin IVPB 900 milliGRAM(s) IV Intermittent every 8 hours  dextrose 5%. 1000 milliLiter(s) (50 mL/Hr) IV Continuous <Continuous>  dextrose 50% Injectable 25 Gram(s) IV Push once  diazepam    Tablet 10 milliGRAM(s) Oral two times a day  docusate sodium 100 milliGRAM(s) Oral three times a day  heparin  Injectable 5000 Unit(s) SubCutaneous every 8 hours  insulin lispro (HumaLOG) corrective regimen sliding scale   SubCutaneous three times a day before meals  magnesium sulfate  IVPB 1 Gram(s) IV Intermittent once  meropenem  IVPB 1000 milliGRAM(s) IV Intermittent every 24 hours  methadone    Tablet 10 milliGRAM(s) Oral daily  ondansetron Injectable 4 milliGRAM(s) IV Push every 8 hours  pantoprazole    Tablet 40 milliGRAM(s) Oral before breakfast  sodium chloride 0.9% Bolus 250 milliLiter(s) IV Bolus once  vancomycin  IVPB 1000 milliGRAM(s) IV Intermittent every 24 hours    DVT Prophylaxis: heparin  Injectable 5000 Unit(s) SubCutaneous every 8 hours    GI Prophylaxis: pantoprazole    Tablet 40 milliGRAM(s) Oral before breakfast    Antibiotics: clindamycin IVPB 900 milliGRAM(s) IV Intermittent every 8 hours  meropenem  IVPB 1000 milliGRAM(s) IV Intermittent every 24 hours  vancomycin  IVPB 1000 milliGRAM(s) IV Intermittent every 24 hours    Anticoagulation:   Medications:[PRN]  glucagon  Injectable 1 milliGRAM(s) IntraMuscular once PRN  morphine  - Injectable 2 milliGRAM(s) IV Push every 2 hours PRN  naloxone Injectable 0.4 milliGRAM(s) IV Push once PRN  oxyCODONE    IR 30 milliGRAM(s) Oral every 12 hours PRN  senna 2 Tablet(s) Oral daily PRN    Labs:                        7.7    13.16 )-----------( 409      ( 2019)             25.2     06-07    135  |  109  |  51<H>  ----------------------------<  102  4.9   |  14<L>  |  4.1<H>    Ca    7.5<L>      2019   Phos  6.7     06-07  Mg     1.8     06-07        PT/INR - ( 2019)  PT: 19.90 sec;   INR: 1.74 ratio         PTT - ( 2019)  PTT:45.6 sec  ABG - ( 2019 12:33 )  pH: 7.34  /  pCO2: 34    /  pO2: 35    / HCO3: 19    / Base Excess: -6.4  /  SaO2: 67                CARDIAC MARKERS ( 2019 11:15 )  x     / 0.08 ng/mL / 8 U/L / x     / 1.5 ng/mL  CARDIAC MARKERS ( 2019 00:13 )  x     / 0.08 ng/mL / 9 U/L / x     / 1.7 ng/mL  CARDIAC MARKERS ( 2019 12:12 )  x     / 0.08 ng/mL / 13 U/L / x     / 1.7 ng/mL      Urine/Micro:    Culture - Other (collected 2019 00:24)  Source: .Other right medial ankle culterette  Preliminary Report (2019 16:42):    Numerous Gram Negative Rods    Culture - Blood (collected 2019 21:50)  Source: .Blood Blood  Preliminary Report (2019 07:00):    No growth to date.    Culture - Other (collected 2019 21:25)  Source: .Other None  Preliminary Report (2019 12:47):    Numerous Gram Negative Rods    Few Corynebacterium species "Susceptibilities not performed"    Culture - Blood (collected 2019 21:25)  Source: .Blood Blood  Preliminary Report (2019 07:00):    No growth to date.      Urinalysis Basic - ( 2019 00:13 )    Color: Yellow / Appearance: Clear / S.010 / pH: x  Gluc: x / Ketone: Negative  / Bili: Negative / Urobili: 0.2 mg/dL   Blood: x / Protein: 100 mg/dL / Nitrite: Negative   Leuk Esterase: Small / RBC: 1-2 /HPF / WBC 6-10 /HPF   Sq Epi: x / Non Sq Epi: Occasional /HPF / Bacteria: Few /HPF

## 2019-06-07 NOTE — PROGRESS NOTE ADULT - ASSESSMENT
Assessment:  58y Male patient admitted S/P right Lois MCKEE    Plan:  Diet CC  Monitor BP  Pain control  OR Monday for closure  Encourage incentive spirometer use

## 2019-06-07 NOTE — PROGRESS NOTE ADULT - SUBJECTIVE AND OBJECTIVE BOX
MARGE BELLA  58y, Male  Allergy: sulfamethizole (Unknown)      CHIEF COMPLAINT: Hemodynamic monitoring. sepsis (06 Jun 2019 03:58)      INTERVAL EVENTS/HPI  - No acute events overnight  - T(F): , Max: 97.9 (06-06-19 @ 16:00)  - Denies any worsening symptoms  - Tolerating medication  - WBC Count: 13.16 K/uL (06-07-19 @ 01:20) uptrending       ROS  Negative except as per noted above in HPI  Denies cough  Denies diarrhea  Denies dysuria   Denies pain at any IV site     FH noncontributory    VITALS:  T(F): 96.6, Max: 97.9 (06-06-19 @ 16:00)  HR: 88  BP: 117/72  RR: 14Vital Signs Last 24 Hrs  T(C): 35.9 (07 Jun 2019 08:00), Max: 36.6 (06 Jun 2019 16:00)  T(F): 96.6 (07 Jun 2019 08:00), Max: 97.9 (06 Jun 2019 16:00)  HR: 88 (07 Jun 2019 08:00) (84 - 96)  BP: 117/72 (07 Jun 2019 08:00) (86/51 - 117/73)  BP(mean): 77 (07 Jun 2019 08:00) (61 - 95)  RR: 14 (07 Jun 2019 08:00) (8 - 20)  SpO2: 99% (07 Jun 2019 08:00) (93% - 100%)    PHYSICAL EXAM:  Gen: NAD, resting in bed  HEENT: Normocephalic, atraumatic  Neck: supple, no lymphadenopathy  CV: Regular rate & regular rhythm  Lungs: decreased BS at bases, no fremitus  Abdomen: Soft, BS present spc  Ext: Warm, well perfused, R amputation dressings   Neuro: non focal, awake  Skin: no rash, no erythema      TESTS & MEASUREMENTS:                        7.7    13.16 )-----------( 409      ( 07 Jun 2019 01:20 )             25.2     06-07    135  |  109  |  51<H>  ----------------------------<  81  4.9   |  14<L>  |  4.1<HH>    Ca    7.5<L>      07 Jun 2019 01:20  Phos  6.7     06-07  Mg     1.8     06-07    TPro  4.8<L>  /  Alb  1.3<L>  /  TBili  <0.2  /  DBili  0.2  /  AST  12  /  ALT  9   /  AlkPhos  189<H>  06-06    eGFR if Non African American: 15 mL/min/1.73M2 (06-07-19 @ 01:20)  eGFR if African American: 18 mL/min/1.73M2 (06-07-19 @ 01:20)    LIVER FUNCTIONS - ( 06 Jun 2019 10:13 )  Alb: 1.3 g/dL / Pro: 4.8 g/dL / ALK PHOS: 189 U/L / ALT: 9 U/L / AST: 12 U/L / GGT: x               Culture - Blood (collected 06-05-19 @ 04:37)  Source: .Blood None  Preliminary Report (06-06-19 @ 14:01):    No growth to date.    Culture - Other (collected 06-04-19 @ 00:24)  Source: .Other right medial ankle culterette  Final Report (06-06-19 @ 17:38):    Numerous Escherichia coli  Organism: Escherichia coli (06-06-19 @ 17:38)  Organism: Escherichia coli (06-06-19 @ 17:38)      -  Amikacin: S <=16      -  Ampicillin: R >16 These ampicillin results predict results for amoxicillin      -  Ampicillin/Sulbactam: R >16/8      -  Aztreonam: S <=4      -  Cefazolin: I 16      -  Cefepime: S <=4      -  Cefoxitin: S <=8      -  Ceftriaxone: S <=1 Enterobacter, Citrobacter, and Serratia may develop resistance during prolonged therapy      -  Ciprofloxacin: R >2      -  Ertapenem: S <=1      -  Gentamicin: R >8      -  Imipenem: S <=1      -  Levofloxacin: R >4      -  Meropenem: S <=1      -  Piperacillin/Tazobactam: S <=16      -  Tobramycin: I 8      -  Trimethoprim/Sulfamethoxazole: R >2/38      Method Type: ALLISON    Culture - Blood (collected 06-03-19 @ 21:50)  Source: .Blood Blood  Preliminary Report (06-05-19 @ 07:00):    No growth to date.    Culture - Other (collected 06-03-19 @ 21:25)  Source: .Other None  Preliminary Report (06-06-19 @ 13:51):    Numerous Escherichia coli    Few Corynebacterium species "Susceptibilities not performed"  Organism: Escherichia coli (06-06-19 @ 13:50)  Organism: Escherichia coli (06-06-19 @ 13:50)      -  Amikacin: S <=16      -  Ampicillin: R >16 These ampicillin results predict results for amoxicillin      -  Ampicillin/Sulbactam: R >16/8      -  Aztreonam: S <=4      -  Cefazolin: I 16      -  Cefepime: S <=4      -  Cefoxitin: S <=8      -  Ceftriaxone: S <=1 Enterobacter, Citrobacter, and Serratia may develop resistance during prolonged therapy      -  Ciprofloxacin: R >2      -  Ertapenem: S <=1      -  Gentamicin: R >8      -  Imipenem: S <=1      -  Levofloxacin: R >4      -  Meropenem: S <=1      -  Piperacillin/Tazobactam: S <=16      -  Tobramycin: I 8      -  Trimethoprim/Sulfamethoxazole: R >2/38      Method Type: ALLISNO    Culture - Blood (collected 06-03-19 @ 21:25)  Source: .Blood Blood  Preliminary Report (06-05-19 @ 07:00):    No growth to date.        Lactate, Blood: 0.4 mmol/L (06-05-19 @ 06:49)  Lactate, Blood: 0.3 mmol/L (06-05-19 @ 00:13)  Lactate, Blood: 0.7 mmol/L (06-04-19 @ 05:01)  Blood Gas Venous - Lactate: 1.4 mmoL/L (06-03-19 @ 22:34)  Lactate, Blood: 1.2 mmol/L (06-03-19 @ 20:54)      INFECTIOUS DISEASES TESTING      RADIOLOGY & ADDITIONAL TESTS:  I have personally reviewed the last Chest xray  CXR      CT      CARDIOLOGY TESTING  Transthoracic Echocardiogram:    EXAM:  2-D ECHO (TTE) COMPLETE        PROCEDURE DATE:  06/04/2019      INTERPRETATION:  REPORT:    TRANSTHORACIC ECHOCARDIOGRAM REPORT         Patient Name:   MARGE BELLA Accession #: [id number]  Medical Rec #:  [id number]   Height:      72.0 in 182.9 cm  YOB: 1960  Weight:      150.0 lb 68.04 kg  Patient Age:    58 years   BSA:         1.89 m²  Patient Gender: M          BP:          95/55 mmHg       Date of Exam:        6/4/2019 5:48:30 PM  Referring Physician: [id number] ED UNASSIGNED  Sonographer:         Kira Hawkins  Reading Physician:   Elliot Díaz MD.    Procedure:     2D Echo/Doppler/Color Doppler Complete.  Indications:   I42.9 - Cardiomyopathy, unspecified  Diagnosis:     I42.9 - Cardiomyopathy, unspecified  Study Details: Technically good study.         Summary:   1. Left ventricular ejection fraction, by visual estimation, is 65 to   70%.   2. Technically good study.   3. Normal left ventricular size and wall thicknesses, with normal   systolic and diastolic function.   4. The mean global longitudinal strain by speckle tracking is -20.2%   which is normal.   5. Moderate mitral annular calcification.   6. LA volume Index is 31.5 ml/m² ml/m2.    PHYSICIAN INTERPRETATION:  Left Ventricle: Normal left ventricular size and wall thicknesses, with   normal systolic and diastolic function. Left ventricular ejection   fraction, by visual estimation, is 65 to 70%. The mean global   longitudinal strain by speckle tracking is -20.2% which is normal.  Right Ventricle: Normal right ventricular size and function.  Left Atrium: Normal left atrial size. LA volume Index is 31.5 ml/m² ml/m2.  Right Atrium: Normal right atrial size.  Pericardium: There is no evidence of pericardial effusion.  Mitral Valve: Structurally normal mitral valve, with normal leaflet   excursion. The mitral valve is normal in structure. There is moderate   mitral annular calcification. Mild mitral valve regurgitation is seen.  Tricuspid Valve: Structurally normal tricuspid valve, with normal leaflet   excursion. The tricuspid valve is normal in structure. Mild tricuspid   regurgitation is visualized.  Aortic Valve: Normal trileaflet aortic valve with normal opening. The   aortic valve is normal. No evidence of aortic valve regurgitation is seen.  Pulmonic Valve: Structurally normal pulmonic valve, with normal leaflet   excursion. The pulmonic valve is normal. No indication of pulmonic valve   regurgitation.  Aorta: The aortic root and ascending aorta are structurally normal, with   no evidence of dilitation.  Pulmonary Artery: The main pulmonary artery is normal in size.  Venous: The inferior vena cava was normal sized, with respiratory size   variation greater than 50%.       2D AND M-MODE MEASUREMENTS (normal ranges within parentheses):  Left Ventricle:                  Normal   Aorta/Left Atrium:               Normal  IVSd (2D):              0.99 cm (0.7-1.1) AoV Cusp Separation: 2.14 cm   (1.5-2.6)  LVPWd (2D):             1.01 cm (0.7-1.1) Left Atrium (Mmode): 4.22 cm   (1.9-4.0)  LVIDd (2D):             4.31 cm (3.4-5.7) Right Ventricle:  LVIDs (2D):             3.12 cm           RVd (2D):        2.78 cm  LV FS (2D):             27.8 %   (>25%)  Relative Wall Thickness  0.47    (<0.42)    SPECTRAL DOPPLER ANALYSIS:  LV DIASTOLIC FUNCTION:  MV Peak E: 1.06 m/s Decel Time: 191 msec  MV Peak A: 1.40 m/s  E/A Ratio: 0.76    Aortic Valve:  AoV VMax:    1.74 m/s  AoV Area, Vmax: 2.55 cm² Vmax Indx: 1.35 cm²/m²  AoV Pk Grad: 12.1 mmHg    LVOT Vmax: 1.41 m/s  LVOT VTI:  0.37 m  LVOT Diam: 2.00 cm    Mitral Valve:  MV P1/2 Time: 55.33 msec  MV Area, PHT: 3.98 cm²    Tricuspid Valve and PA/RV Systolic Pressure: TR Max Velocity: 2.63 m/s RA   Pressure: 3 mmHg RVSP/PASP: 30.7 mmHg       [id number] Elliot Díaz MD, Electronically signed on 6/4/2019 at 10:00:57   PM              *** Final ***                    ELLIOT DÍAZ MD  This document has been electronically signed. Jun 4 2019  5:48PM             (06-04-19 @ 17:48)  12 Lead ECG:   Ventricular Rate 80 BPM    Atrial Rate 80 BPM    P-R Interval 184 ms    QRS Duration 98 ms    Q-T Interval 400 ms    QTC Calculation(Bezet) 461 ms    P Axis 48 degrees    R Axis -22 degrees    T Axis 31 degrees    Diagnosis Line Normal sinus rhythm  Normal ECG    Confirmed by ELLIOT DÍAZ MD ([id number]) on 6/4/2019 3:26:26 PM (06-04-19 @ 13:45)      MEDICATIONS  ALPRAZolam 2  chlorhexidine 4% Liquid 1  clindamycin IVPB 900  dextrose 5%. 1000  dextrose 50% Injectable 25  diazepam    Tablet 10  docusate sodium 100  heparin  Injectable 5000  insulin lispro (HumaLOG) corrective regimen sliding scale   meropenem  IVPB 1000  methadone    Tablet 10  pantoprazole    Tablet 40  vancomycin  IVPB 1000      ANTIBIOTICS:  clindamycin IVPB 900 milliGRAM(s) IV Intermittent every 8 hours  meropenem  IVPB 1000 milliGRAM(s) IV Intermittent every 24 hours  vancomycin  IVPB 1000 milliGRAM(s) IV Intermittent every 24 hours MARGE BELLA  58y, Male  Allergy: sulfamethizole (Unknown)      CHIEF COMPLAINT: Hemodynamic monitoring. sepsis (06 Jun 2019 03:58)      INTERVAL EVENTS/HPI  - No acute events overnight  - discomfort at R chest IV   - T(F): , Max: 97.9 (06-06-19 @ 16:00)  - Denies any worsening symptoms  - Tolerating medication  - WBC Count: 13.16 K/uL (06-07-19 @ 01:20) uptrending       ROS  Negative except as per noted above in HPI  Denies cough  Denies diarrhea  Denies dysuria   Denies pain at any IV site     FH noncontributory    VITALS:  T(F): 96.6, Max: 97.9 (06-06-19 @ 16:00)  HR: 88  BP: 117/72  RR: 14Vital Signs Last 24 Hrs  T(C): 35.9 (07 Jun 2019 08:00), Max: 36.6 (06 Jun 2019 16:00)  T(F): 96.6 (07 Jun 2019 08:00), Max: 97.9 (06 Jun 2019 16:00)  HR: 88 (07 Jun 2019 08:00) (84 - 96)  BP: 117/72 (07 Jun 2019 08:00) (86/51 - 117/73)  BP(mean): 77 (07 Jun 2019 08:00) (61 - 95)  RR: 14 (07 Jun 2019 08:00) (8 - 20)  SpO2: 99% (07 Jun 2019 08:00) (93% - 100%)    PHYSICAL EXAM:  Gen: NAD, resting in bed  HEENT: Normocephalic, atraumatic  Neck: supple, no lymphadenopathy  CV: Regular rate & regular rhythm  Lungs: decreased BS at bases, no fremitus  Abdomen: Soft, BS present spc  Ext: Warm, well perfused, R amputation dressings   Neuro: non focal, awake  Skin: no rash, no erythema  Lines: ant chest IV streaky erythema    TESTS & MEASUREMENTS:                        7.7    13.16 )-----------( 409      ( 07 Jun 2019 01:20 )             25.2     06-07    135  |  109  |  51<H>  ----------------------------<  81  4.9   |  14<L>  |  4.1<HH>    Ca    7.5<L>      07 Jun 2019 01:20  Phos  6.7     06-07  Mg     1.8     06-07    TPro  4.8<L>  /  Alb  1.3<L>  /  TBili  <0.2  /  DBili  0.2  /  AST  12  /  ALT  9   /  AlkPhos  189<H>  06-06    eGFR if Non African American: 15 mL/min/1.73M2 (06-07-19 @ 01:20)  eGFR if African American: 18 mL/min/1.73M2 (06-07-19 @ 01:20)    LIVER FUNCTIONS - ( 06 Jun 2019 10:13 )  Alb: 1.3 g/dL / Pro: 4.8 g/dL / ALK PHOS: 189 U/L / ALT: 9 U/L / AST: 12 U/L / GGT: x               Culture - Blood (collected 06-05-19 @ 04:37)  Source: .Blood None  Preliminary Report (06-06-19 @ 14:01):    No growth to date.    Culture - Other (collected 06-04-19 @ 00:24)  Source: .Other right medial ankle culterette  Final Report (06-06-19 @ 17:38):    Numerous Escherichia coli  Organism: Escherichia coli (06-06-19 @ 17:38)  Organism: Escherichia coli (06-06-19 @ 17:38)      -  Amikacin: S <=16      -  Ampicillin: R >16 These ampicillin results predict results for amoxicillin      -  Ampicillin/Sulbactam: R >16/8      -  Aztreonam: S <=4      -  Cefazolin: I 16      -  Cefepime: S <=4      -  Cefoxitin: S <=8      -  Ceftriaxone: S <=1 Enterobacter, Citrobacter, and Serratia may develop resistance during prolonged therapy      -  Ciprofloxacin: R >2      -  Ertapenem: S <=1      -  Gentamicin: R >8      -  Imipenem: S <=1      -  Levofloxacin: R >4      -  Meropenem: S <=1      -  Piperacillin/Tazobactam: S <=16      -  Tobramycin: I 8      -  Trimethoprim/Sulfamethoxazole: R >2/38      Method Type: ALLISON    Culture - Blood (collected 06-03-19 @ 21:50)  Source: .Blood Blood  Preliminary Report (06-05-19 @ 07:00):    No growth to date.    Culture - Other (collected 06-03-19 @ 21:25)  Source: .Other None  Preliminary Report (06-06-19 @ 13:51):    Numerous Escherichia coli    Few Corynebacterium species "Susceptibilities not performed"  Organism: Escherichia coli (06-06-19 @ 13:50)  Organism: Escherichia coli (06-06-19 @ 13:50)      -  Amikacin: S <=16      -  Ampicillin: R >16 These ampicillin results predict results for amoxicillin      -  Ampicillin/Sulbactam: R >16/8      -  Aztreonam: S <=4      -  Cefazolin: I 16      -  Cefepime: S <=4      -  Cefoxitin: S <=8      -  Ceftriaxone: S <=1 Enterobacter, Citrobacter, and Serratia may develop resistance during prolonged therapy      -  Ciprofloxacin: R >2      -  Ertapenem: S <=1      -  Gentamicin: R >8      -  Imipenem: S <=1      -  Levofloxacin: R >4      -  Meropenem: S <=1      -  Piperacillin/Tazobactam: S <=16      -  Tobramycin: I 8      -  Trimethoprim/Sulfamethoxazole: R >2/38      Method Type: ALLISON    Culture - Blood (collected 06-03-19 @ 21:25)  Source: .Blood Blood  Preliminary Report (06-05-19 @ 07:00):    No growth to date.        Lactate, Blood: 0.4 mmol/L (06-05-19 @ 06:49)  Lactate, Blood: 0.3 mmol/L (06-05-19 @ 00:13)  Lactate, Blood: 0.7 mmol/L (06-04-19 @ 05:01)  Blood Gas Venous - Lactate: 1.4 mmoL/L (06-03-19 @ 22:34)  Lactate, Blood: 1.2 mmol/L (06-03-19 @ 20:54)      INFECTIOUS DISEASES TESTING      RADIOLOGY & ADDITIONAL TESTS:  I have personally reviewed the last Chest xray  CXR      CT      CARDIOLOGY TESTING  Transthoracic Echocardiogram:    EXAM:  2-D ECHO (TTE) COMPLETE        PROCEDURE DATE:  06/04/2019      INTERPRETATION:  REPORT:    TRANSTHORACIC ECHOCARDIOGRAM REPORT         Patient Name:   AMRGE BELLA Accession #: [id number]  Medical Rec #:  [id number]   Height:      72.0 in 182.9 cm  YOB: 1960  Weight:      150.0 lb 68.04 kg  Patient Age:    58 years   BSA:         1.89 m²  Patient Gender: M          BP:          95/55 mmHg       Date of Exam:        6/4/2019 5:48:30 PM  Referring Physician: [id number] ED UNASSIGNED  Sonographer:         Kira Hawkins  Reading Physician:   Elliot Díaz MD.    Procedure:     2D Echo/Doppler/Color Doppler Complete.  Indications:   I42.9 - Cardiomyopathy, unspecified  Diagnosis:     I42.9 - Cardiomyopathy, unspecified  Study Details: Technically good study.         Summary:   1. Left ventricular ejection fraction, by visual estimation, is 65 to   70%.   2. Technically good study.   3. Normal left ventricular size and wall thicknesses, with normal   systolic and diastolic function.   4. The mean global longitudinal strain by speckle tracking is -20.2%   which is normal.   5. Moderate mitral annular calcification.   6. LA volume Index is 31.5 ml/m² ml/m2.    PHYSICIAN INTERPRETATION:  Left Ventricle: Normal left ventricular size and wall thicknesses, with   normal systolic and diastolic function. Left ventricular ejection   fraction, by visual estimation, is 65 to 70%. The mean global   longitudinal strain by speckle tracking is -20.2% which is normal.  Right Ventricle: Normal right ventricular size and function.  Left Atrium: Normal left atrial size. LA volume Index is 31.5 ml/m² ml/m2.  Right Atrium: Normal right atrial size.  Pericardium: There is no evidence of pericardial effusion.  Mitral Valve: Structurally normal mitral valve, with normal leaflet   excursion. The mitral valve is normal in structure. There is moderate   mitral annular calcification. Mild mitral valve regurgitation is seen.  Tricuspid Valve: Structurally normal tricuspid valve, with normal leaflet   excursion. The tricuspid valve is normal in structure. Mild tricuspid   regurgitation is visualized.  Aortic Valve: Normal trileaflet aortic valve with normal opening. The   aortic valve is normal. No evidence of aortic valve regurgitation is seen.  Pulmonic Valve: Structurally normal pulmonic valve, with normal leaflet   excursion. The pulmonic valve is normal. No indication of pulmonic valve   regurgitation.  Aorta: The aortic root and ascending aorta are structurally normal, with   no evidence of dilitation.  Pulmonary Artery: The main pulmonary artery is normal in size.  Venous: The inferior vena cava was normal sized, with respiratory size   variation greater than 50%.       2D AND M-MODE MEASUREMENTS (normal ranges within parentheses):  Left Ventricle:                  Normal   Aorta/Left Atrium:               Normal  IVSd (2D):              0.99 cm (0.7-1.1) AoV Cusp Separation: 2.14 cm   (1.5-2.6)  LVPWd (2D):             1.01 cm (0.7-1.1) Left Atrium (Mmode): 4.22 cm   (1.9-4.0)  LVIDd (2D):             4.31 cm (3.4-5.7) Right Ventricle:  LVIDs (2D):             3.12 cm           RVd (2D):        2.78 cm  LV FS (2D):             27.8 %   (>25%)  Relative Wall Thickness  0.47    (<0.42)    SPECTRAL DOPPLER ANALYSIS:  LV DIASTOLIC FUNCTION:  MV Peak E: 1.06 m/s Decel Time: 191 msec  MV Peak A: 1.40 m/s  E/A Ratio: 0.76    Aortic Valve:  AoV VMax:    1.74 m/s  AoV Area, Vmax: 2.55 cm² Vmax Indx: 1.35 cm²/m²  AoV Pk Grad: 12.1 mmHg    LVOT Vmax: 1.41 m/s  LVOT VTI:  0.37 m  LVOT Diam: 2.00 cm    Mitral Valve:  MV P1/2 Time: 55.33 msec  MV Area, PHT: 3.98 cm²    Tricuspid Valve and PA/RV Systolic Pressure: TR Max Velocity: 2.63 m/s RA   Pressure: 3 mmHg RVSP/PASP: 30.7 mmHg       [id number] Elliot íDaz MD, Electronically signed on 6/4/2019 at 10:00:57   PM              *** Final ***                    ELLIOT DÍAZ MD  This document has been electronically signed. Jun 4 2019  5:48PM             (06-04-19 @ 17:48)  12 Lead ECG:   Ventricular Rate 80 BPM    Atrial Rate 80 BPM    P-R Interval 184 ms    QRS Duration 98 ms    Q-T Interval 400 ms    QTC Calculation(Bezet) 461 ms    P Axis 48 degrees    R Axis -22 degrees    T Axis 31 degrees    Diagnosis Line Normal sinus rhythm  Normal ECG    Confirmed by ELLIOT DÍAZ MD ([id number]) on 6/4/2019 3:26:26 PM (06-04-19 @ 13:45)      MEDICATIONS  ALPRAZolam 2  chlorhexidine 4% Liquid 1  clindamycin IVPB 900  dextrose 5%. 1000  dextrose 50% Injectable 25  diazepam    Tablet 10  docusate sodium 100  heparin  Injectable 5000  insulin lispro (HumaLOG) corrective regimen sliding scale   meropenem  IVPB 1000  methadone    Tablet 10  pantoprazole    Tablet 40  vancomycin  IVPB 1000      ANTIBIOTICS:  clindamycin IVPB 900 milliGRAM(s) IV Intermittent every 8 hours  meropenem  IVPB 1000 milliGRAM(s) IV Intermittent every 24 hours  vancomycin  IVPB 1000 milliGRAM(s) IV Intermittent every 24 hours

## 2019-06-07 NOTE — PROGRESS NOTE ADULT - ASSESSMENT
ASSESSMENT  58 M w/ LE weakness 2/2 lumbar compression fracture at the age of 24, resulting in paraplegia wheelchair bound (can move LE but very weak, and has some sensation), with subsequent sacral decubiti and heel ulcers s/p debridements in the past, PAD, suprapubic cath, Chronic pain, Neuropathy, Insomnia, DM2 & HTN that resolved w/ weight loss, is BIBA c/o foul smell & drainage from his RLE    PROBLEMS  #Sepsis ruled out on admission, systolic hypotension, WBC>12  Necrotizing fasciitis s/p OR 6/4 Right Below knee guillotine amputation  Pt has an acute illness which poses threat to bodily function, resolved   BCX NG  OR cultures with Ecoli (R unasyn, fluoro, bactrim, I cefazolin)   #SILVINA   #Hyponatremia/Hypomagnesia  #Hypotension 2/2 opioids     RECOMMENDATIONS  - D/C vanc 1g q24h as no MRSA in cultures, SILVINA (should be dosed by level), and clinda/simón has adequate GP coverage  - Please add differential to CBC  - Meropenem 1g q24h IV  - Clinda 900mg q8h IV   - Likely plan for 5 days post OR date of abx as clean margins (end 6/8)    Spectra [phone] ASSESSMENT  58 M w/ LE weakness 2/2 lumbar compression fracture at the age of 24, resulting in paraplegia wheelchair bound (can move LE but very weak, and has some sensation), with subsequent sacral decubiti and heel ulcers s/p debridements in the past, PAD, suprapubic cath, Chronic pain, Neuropathy, Insomnia, DM2 & HTN that resolved w/ weight loss, is BIBA c/o foul smell & drainage from his RLE    PROBLEMS  #Sepsis ruled out on admission, systolic hypotension, WBC>12  Necrotizing fasciitis s/p OR 6/4 Right Below knee guillotine amputation  Pt has an acute illness which poses threat to bodily function, resolved   BCX NG  OR cultures with Ecoli (R unasyn, fluoro, bactrim, I cefazolin)   #SILVINA   #Hyponatremia/Hypomagnesia  #Hypotension 2/2 opioids   #R chest IV phlebitis    RECOMMENDATIONS  - Remove R chest IV as e/o phlebitis   - D/C vanc 1g q24h as no MRSA in cultures, SILVINA (should be dosed by level), and clinda/simón has adequate GP coverage  - Please add differential to CBC  - Given e.coli sensitivities D/C simón/clinda and change to zosyn 2.25 q8h   - Likely plan for 5 days post OR date of abx as clean margins (end 6/8) pending WBC and clinical course     Spectra [phone]

## 2019-06-07 NOTE — PROGRESS NOTE ADULT - ASSESSMENT
58 M w/ PMH of Paraplegia 2/2 Lumbar compression Fx, sacral decubiti and heel ulcers s/p debridements,, PAD, suprapubic cath,Neuropathy, DM2 & HTN that resolved w/ weight loss, is BIBA c/o foul smell & drainage from his RLE, found to be anemic, in SILVINA, & gangrene of RLE. Renal is consulted for SILVINA. sp right BKA       # SILVINA w/ Metabolic Acidosis ( baseline 1.1 in   8/ 2018) and electrolyte abn ;  r/o ATN   - Creat stable / non oliguric   -suggest resume IVF and consider 1/2 NS with 75 meq bicarb at 75 cc per hour   - Start sodium bicarb  - sono no hydro/ medical renal disease   - avoid nephrotoxic drugs   -monitor urine out put  - avoid hypotension   -on Merrem / clindamycin .. switch to Zosyn IV as per ID   -no need for RRT   - Hyperphosphatemia start Phoslo one tab po qac     # Microcytic Anemia  - s/p 2 U PRBCs in ER   plz check iron studies   follow Hb     will follow

## 2019-06-07 NOTE — MEDICAL STUDENT PROGRESS NOTE(EDUCATION) - NS MD HP STUD ASPLAN ASSES FT
Patient is a 57 yo male with pmh of lumbar compression fracture at the age of 24, resulting in parapalegia/ wheelchair bound with subsequent sacral decubiti and heel ulcers s/p debridements in the past and currently( managed by Dr. 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    Plan as per vascular is to continue with CC diet, monitor brown urine output, pain control, continue pt on abx, pt to go to OR for bka closure on 6/10, and encourage spirometer use. Pt also to potentially be downgraded, per sicu resident note.

## 2019-06-07 NOTE — PROGRESS NOTE ADULT - SUBJECTIVE AND OBJECTIVE BOX
Nephrology progress note    Patient is seen and examined, events over the last 24 h noted .    Allergies:  sulfamethizole (Unknown)    Hospital Medications:   MEDICATIONS  (STANDING):  ALPRAZolam 2 milliGRAM(s) Oral at bedtime  clindamycin IVPB 900 milliGRAM(s) IV Intermittent every 8 hours  diazepam    Tablet 10 milliGRAM(s) Oral two times a day  docusate sodium 100 milliGRAM(s) Oral three times a day  heparin  Injectable 5000 Unit(s) SubCutaneous every 8 hours  insulin lispro (HumaLOG) corrective regimen sliding scale   SubCutaneous three times a day before meals  meropenem  IVPB 1000 milliGRAM(s) IV Intermittent every 24 hours  methadone    Tablet 10 milliGRAM(s) Oral daily  pantoprazole    Tablet 40 milliGRAM(s) Oral before breakfast        VITALS:  T(F): 96.6 (19 @ 08:00), Max: 97.9 (19 @ 16:00)  HR: 88 (19 @ 08:00)  BP: 117/72 (19 @ 08:00)  RR: 14 (19 @ 08:00)  SpO2: 99% (19 @ 08:00)  Wt(kg): --     @ 07:01  -   @ 07:00  --------------------------------------------------------  IN: 2450 mL / OUT: 1335 mL / NET: 1115 mL     @ 07:  -   @ 07:00  --------------------------------------------------------  IN: 900 mL / OUT: 1545 mL / NET: -645 mL    07 @ 07:01  -   @ 10:02  --------------------------------------------------------  IN: 100 mL / OUT: 75 mL / NET: 25 mL          PHYSICAL EXAM:  Constitutional: NAD  HEENT: anicteric sclera, oropharynx clear, MMM  Neck: No JVD  Respiratory: CTAB, no wheezes, rales or rhonchi  Cardiovascular: S1, S2, RRR  Gastrointestinal: BS+, soft, NT/ND  Extremities: No cyanosis or clubbing. No peripheral edema  :  No brown.   Skin: No rashes    LABS:      135  |  109  |  51<H>  ----------------------------<  81  4.9   |  14<L>  |  4.1<HH>  Creatinine Trend: 4.1<--, 4.0<--, 4.2<--, 4.3<--, 4.6<--, 4.9<--  Ca    7.5<L>      2019 01:20  Phos  6.7       Mg     1.8         TPro  4.8<L>  /  Alb  1.3<L>  /  TBili  <0.2  /  DBili  0.2  /  AST  12  /  ALT  9   /  AlkPhos  189<H>                            7.7    13.16 )-----------( 409      ( 2019 01:20 )             25.2       Urine Studies:  Urinalysis Basic - ( 2019 00:13 )    Color: Yellow / Appearance: Clear / S.010 / pH:   Gluc:  / Ketone: Negative  / Bili: Negative / Urobili: 0.2 mg/dL   Blood:  / Protein: 100 mg/dL / Nitrite: Negative   Leuk Esterase: Small / RBC: 1-2 /HPF / WBC 6-10 /HPF   Sq Epi:  / Non Sq Epi: Occasional /HPF / Bacteria: Few /HPF      Protein/Creatinine Ratio Calculation: 7 Ratio ( @ 00:13)  Creatinine, Random Urine: 39 mg/dL ( @ 00:13)  Chloride, Random Urine: 26 ( @ 00:13)  Sodium, Random Urine: 38.0 mmoL/L ( @ 00:13)  Osmolality, Random Urine: 275 mos/kg ( @ 00:13)  Creatinine, Random Urine: 16 mg/dL ( @ 18:10)  Potassium, Random Urine: 13 mmol/L ( @ 18:10)  Sodium, Random Urine: 33.0 mmoL/L ( @ 18:10)    RADIOLOGY & ADDITIONAL STUDIES: Nephrology progress note    Patient is seen and examined, events over the last 24 h noted .  denied any complaints  Lying in bed comfortable    Allergies:  sulfamethizole (Unknown)    Hospital Medications:   MEDICATIONS  (STANDING):  ALPRAZolam 2 milliGRAM(s) Oral at bedtime  clindamycin IVPB 900 milliGRAM(s) IV Intermittent every 8 hours  diazepam    Tablet 10 milliGRAM(s) Oral two times a day  docusate sodium 100 milliGRAM(s) Oral three times a day  heparin  Injectable 5000 Unit(s) SubCutaneous every 8 hours  insulin lispro (HumaLOG) corrective regimen sliding scale   SubCutaneous three times a day before meals  meropenem  IVPB 1000 milliGRAM(s) IV Intermittent every 24 hours  methadone    Tablet 10 milliGRAM(s) Oral daily  pantoprazole    Tablet 40 milliGRAM(s) Oral before breakfast        VITALS:  T(F): 96.6 (19 @ 08:00), Max: 97.9 (19 @ 16:00)  HR: 88 (19 @ 08:00)  BP: 117/72 (19 @ 08:00)  RR: 14 (19 @ 08:00)  SpO2: 99% (19 @ 08:00)       @ 07:01  -   @ 07:00  --------------------------------------------------------  IN: 2450 mL / OUT: 1335 mL / NET: 1115 mL     @ 07:  -   @ 07:00  --------------------------------------------------------  IN: 900 mL / OUT: 1545 mL / NET: -645 mL     @ 07:01  -   @ 10:02  --------------------------------------------------------  IN: 100 mL / OUT: 75 mL / NET: 25 mL          PHYSICAL EXAM:  Constitutional: NAD pale  HEENT: subicteric sclera, oropharynx clear, MMM  Neck: No JVD  Respiratory: CTAB, no wheezes, rales or rhonchi  Cardiovascular: S1, S2, RRR  Gastrointestinal: BS+, soft, NT/ND  Extremities: No cyanosis or clubbing. right BKA   :  No brown.   Skin: No rashes    LABS:      135  |  109  |  51<H>  ----------------------------<  81  4.9   |  14<L>  |  4.1<HH>  Creatinine Trend: 4.1<--, 4.0<--, 4.2<--, 4.3<--, 4.6<--, 4.9<--  Ca    7.5<L>      2019 01:20  Phos  6.7       Mg     1.8         TPro  4.8<L>  /  Alb  1.3<L>  /  TBili  <0.2  /  DBili  0.2  /  AST  12  /  ALT  9   /  AlkPhos  189<H>                            7.7    13.16 )-----------( 409      ( 2019 01:20 )             25.2       Urine Studies:  Urinalysis Basic - ( 2019 00:13 )    Color: Yellow / Appearance: Clear / S.010 / pH:   Gluc:  / Ketone: Negative  / Bili: Negative / Urobili: 0.2 mg/dL   Blood:  / Protein: 100 mg/dL / Nitrite: Negative   Leuk Esterase: Small / RBC: 1-2 /HPF / WBC 6-10 /HPF   Sq Epi:  / Non Sq Epi: Occasional /HPF / Bacteria: Few /HPF      Protein/Creatinine Ratio Calculation: 7 Ratio ( @ 00:13)  Creatinine, Random Urine: 39 mg/dL ( @ 00:13)  Chloride, Random Urine: 26 ( @ 00:13)  Sodium, Random Urine: 38.0 mmoL/L ( @ 00:13)  Osmolality, Random Urine: 275 mos/kg ( @ 00:13)  Creatinine, Random Urine: 16 mg/dL ( @ 18:10)  Potassium, Random Urine: 13 mmol/L ( @ 18:10)  Sodium, Random Urine: 33.0 mmoL/L ( @ 18:10)    RADIOLOGY & ADDITIONAL STUDIES:

## 2019-06-07 NOTE — PROGRESS NOTE ADULT - SUBJECTIVE AND OBJECTIVE BOX
MARGE BELLA  [id number]  58y Male    Indication for ICU admission:  Admit Date:06-04-19  ICU Date:6/4/19  OR Date:6/4/19    sulfamethizole (Unknown)    PAST MEDICAL & SURGICAL HISTORY:  Hypertension  Suprapubic catheter  Pressure ulcer  Diabetes  Lumbar compression fracture  S/P debridement  Toe amputation status, right  H/O hernia repair    Home Medications:  gabapentin 800 mg oral tablet: 1 tab(s) orally 3 times a day (27 Jul 2018 01:14)  methadone: 10 milligram(s) orally once a day (04 Jun 2019 02:46)  oxyCODONE 30 mg oral tablet: 1 tab(s) orally 4 times a day (03 Aug 2018 14:30)  Testim 1% (50 mg/5 g) transdermal gel: 1 packet(s) transdermal once a day (in the morning) (27 Jul 2018 01:14)  Valium 10 mg oral tablet: orally 2 times a day (27 Jul 2018 01:14)  Xanax 2 mg oral tablet: orally once (at bedtime) (27 Jul 2018 01:14)    24HRS EVENT:    Bout of hypotension with SBP in 80s. NICOM negative @ 4.7, Hypotension most likely due to pain meds. Culture of med ankle showed E. Coli. IVL. LFTs WNL.     Neurologic:   Pain control, on home Methadone and Oxycodone; Morphine prn  Anxiety: Xanax, Valium  Paraplegic    Respiratory: RA, refusing IS  AM  CXR pending    Cardiovascular:     Hypotension-non responsive on NICOM, 2/2 pain rx adminstration, stagger rx    ECHO: EF 65-70%    Hx of HTN: Pt no longer taking BP medications at home after weight loss    Gastrointestinal/Nutrition:     No acute diagnosis. Carb consistent diet.   Encourage PO intake.     PPI. Bowel regimen. IVL.     Renal/Genitourinary:    SILVINA 4, trending down, cont to monitor   Renal U/S: B/l echogenic kidneys compatible with medical renal disease.   Suprapubic catheter in place. Output 40-50cc/hr    Hematologic:    H/H stable  Transfuse if hgb < 7 as per primary team.   DVT prophylaxis-HSQ    INR elevated to 1.76. LFTs WNL.     Infectious Disease:   Necrotizing fasciitis s/p R BKA.     WBC 9-->11-->12    +ORSA on contact    Abx-smión and vanco (lvl 15 on 6/5), clinda    Cultures-    R Ankle Culture 6/4-E. Coli sensitive to simón, current abx    BC 6/54-NGTD **prelim**    Stage 4 Sacral decubitus & b/l buttock ulcers. Wound care. Followed by burn (Dr. Gooden last consult 6/4    Musculoskeletal: R BKA, dressing in place, not saturated. Dressing changed by vascular (Roya) 6/5.   Pulse checks Q4h. Can be out of bed with assistance as per primary team.   OR Monday for revision    Endocrine: DM, not on any medications at home. \  ISS. FS monitoring. Hypoglycemic due to poor PO intake.     ***Tubes/Lines/Drains  ***  Peripheral IV  Urinary Catheter	Yes, Suprapubic	Indication: Strict I&O    Date Placed:     Review Of Systems:  [X ] A ten-point review of systems was otherwise negative except as noted above.  [ ] Due to altered mental status/intubation, subjective information were not able to be obtained from the patient. History was obtained, to the extent possible, from review of the chart, clinical exam and collateral sources of information. MARGE BELLA  [id number]  58y Male    Indication for ICU admission:  Admit Date:06-04-19  ICU Date:6/4/19  OR Date:6/4/19    sulfamethizole (Unknown)    PAST MEDICAL & SURGICAL HISTORY:  Hypertension  Suprapubic catheter  Pressure ulcer  Diabetes  Lumbar compression fracture  S/P debridement  Toe amputation status, right  H/O hernia repair    Home Medications:  gabapentin 800 mg oral tablet: 1 tab(s) orally 3 times a day (27 Jul 2018 01:14)  methadone: 10 milligram(s) orally once a day (04 Jun 2019 02:46)  oxyCODONE 30 mg oral tablet: 1 tab(s) orally 4 times a day (03 Aug 2018 14:30)  Testim 1% (50 mg/5 g) transdermal gel: 1 packet(s) transdermal once a day (in the morning) (27 Jul 2018 01:14)  Valium 10 mg oral tablet: orally 2 times a day (27 Jul 2018 01:14)  Xanax 2 mg oral tablet: orally once (at bedtime) (27 Jul 2018 01:14)    24HRS EVENT:    Bout of hypotension with SBP in 80s. NICOM negative @ 4.7, Hypotension most likely due to pain meds. Culture of med ankle showed E. Coli. IVL. LFTs WNL.     Neurologic:   Pain control, on home Methadone and Oxycodone; Morphine prn  Anxiety: Xanax, Valium  Paraplegic    Respiratory: RA, refusing IS  AM  CXR pending    Cardiovascular:     Hypotension-non responsive on NICOM, 2/2 pain rx adminstration, stagger rx    ECHO: EF 65-70%    Hx of HTN: Pt no longer taking BP medications at home after weight loss    Gastrointestinal/Nutrition:     No acute diagnosis. Carb consistent diet.   Encourage PO intake.     PPI. Bowel regimen. IVL.     Renal/Genitourinary:    SILVINA 4, trending down, cont to monitor   Renal U/S: B/l echogenic kidneys compatible with medical renal disease.   Suprapubic catheter in place. Output 40-50cc/hr    Hematologic:    H/H stable  Transfuse if hgb < 7 as per primary team.   DVT prophylaxis-HSQ    INR elevated to 1.76. LFTs WNL.     Infectious Disease:   Necrotizing fasciitis s/p R BKA.     WBC 9-->11-->12    +ORSA on contact    Abx-simón and vanco (lvl 15 on 6/5), clinda    Cultures-    R Ankle Culture 6/4-E. Coli sensitive to simón, current abx    BC 6/54-NGTD **prelim**    Stage 4 Sacral decubitus & b/l buttock ulcers. Wound care. Followed by burn (Dr. Gooden last consult 6/4    Musculoskeletal: R BKA, dressing in place, not saturated. Dressing changed by vascular (Roya) 6/5.   Pulse checks Q4h. Can be out of bed with assistance as per primary team.   OR Monday for revision    Endocrine: DM, not on any medications at home. \  ISS. FS monitoring. Hypoglycemic due to poor PO intake.     ***Tubes/Lines/Drains  ***  Peripheral IV  Urinary Catheter	Yes, Suprapubic	Indication: Strict I&O    Date Placed:     Review Of Systems:  [X ] A ten-point review of systems was otherwise negative except as noted above.  [ ] Due to altered mental status/intubation, subjective information were not able to be obtained from the patient. History was obtained, to the extent possible, from review of the chart, clinical exam and collateral sources of information.    Daily     Daily     Diet, Consistent Carbohydrate/No Snacks (06-05-19 @ 09:19)      CURRENT MEDS:  Neurologic Medications  ALPRAZolam 2 milliGRAM(s) Oral at bedtime  diazepam    Tablet 10 milliGRAM(s) Oral two times a day  methadone    Tablet 10 milliGRAM(s) Oral daily  morphine  - Injectable 2 milliGRAM(s) IV Push every 2 hours PRN Severe Pain (7 - 10)  oxyCODONE    IR 30 milliGRAM(s) Oral every 12 hours PRN Moderate Pain (4 - 6)    Respiratory Medications    Cardiovascular Medications    Gastrointestinal Medications  dextrose 5%. 1000 milliLiter(s) IV Continuous <Continuous>  docusate sodium 100 milliGRAM(s) Oral three times a day  pantoprazole    Tablet 40 milliGRAM(s) Oral before breakfast  senna 2 Tablet(s) Oral daily PRN Constipation    Genitourinary Medications    Hematologic/Oncologic Medications  heparin  Injectable 5000 Unit(s) SubCutaneous every 8 hours    Antimicrobial/Immunologic Medications  clindamycin IVPB 900 milliGRAM(s) IV Intermittent every 8 hours  meropenem  IVPB 1000 milliGRAM(s) IV Intermittent every 24 hours  vancomycin  IVPB 1000 milliGRAM(s) IV Intermittent every 24 hours    Endocrine/Metabolic Medications  dextrose 50% Injectable 25 Gram(s) IV Push once  glucagon  Injectable 1 milliGRAM(s) IntraMuscular once PRN Glucose LESS THAN 70 milligrams/deciliter  insulin lispro (HumaLOG) corrective regimen sliding scale   SubCutaneous three times a day before meals    Topical/Other Medications  chlorhexidine 4% Liquid 1 Application(s) Topical <User Schedule>  naloxone Injectable 0.4 milliGRAM(s) IV Push once PRN OVERDOSE, DO NOT GIVE WITHOUT PROVIDER APPROVAL      ICU Vital Signs Last 24 Hrs  T(C): 36.1 (07 Jun 2019 00:00), Max: 36.6 (06 Jun 2019 16:00)  T(F): 97 (07 Jun 2019 00:00), Max: 97.9 (06 Jun 2019 16:00)  HR: 86 (07 Jun 2019 07:00) (84 - 96)  BP: 99/64 (07 Jun 2019 07:00) (86/51 - 117/73)  BP(mean): 78 (07 Jun 2019 07:00) (61 - 95)  ABP: 58/46 (06 Jun 2019 17:00) (58/46 - 150/76)  ABP(mean): 54 (06 Jun 2019 17:00) (54 - 98)  RR: 17 (07 Jun 2019 07:00) (8 - 21)  SpO2: 95% (07 Jun 2019 07:00) (93% - 100%)      Adult Advanced Hemodynamics Last 24 Hrs  CVP(mm Hg): --  CVP(cm H2O): --  CO: --  CI: --  PA: --  PA(mean): --  PCWP: --  SVR: --  SVRI: --  PVR: --  PVRI: --          I&O's Summary    06 Jun 2019 07:01  -  07 Jun 2019 07:00  --------------------------------------------------------  IN: 900 mL / OUT: 1545 mL / NET: -645 mL      I&O's Detail    06 Jun 2019 07:01  -  07 Jun 2019 07:00  --------------------------------------------------------  IN:    IV PiggyBack: 400 mL    Sodium Chloride 0.9% IV Bolus: 500 mL  Total IN: 900 mL    OUT:    Indwelling Catheter - Suprapubic: 1545 mL  Total OUT: 1545 mL    Total NET: -645 mL    PHYSICAL EXAM:    General/Neuro:  A&Ox3, Paraplegic, Found sleeping comfortably    Lungs:   Anterior upper lungs clear to auscultation b/l, Normal expansion/effort.     Cardiovascular:  S1, S2.  Regular rate and rhythm.  BP soft with SBP in low 100s. No peripheral edema.   Cardiac Rhythm: Normal Sinus Rhythm    GI:   Abdomen soft, Non-tender, Non-distended.      Extremities: RLE s/p BKA in ace wrap c/d/i. LLE dressing of ulcer c/d/i.    Derm: No open wounds.     : Posadas catheter in place.    CXR: Negative CXR from 6/6    LABS:  CAPILLARY BLOOD GLUCOSE    POCT Blood Glucose.: 102 mg/dL (07 Jun 2019 00:07)  POCT Blood Glucose.: 65 mg/dL (06 Jun 2019 22:57)  POCT Blood Glucose.: 67 mg/dL (06 Jun 2019 22:25)  POCT Blood Glucose.: 74 mg/dL (06 Jun 2019 17:05)  POCT Blood Glucose.: 78 mg/dL (06 Jun 2019 11:34)                          7.7    13.16 )-----------( 409      ( 07 Jun 2019 01:20 )             25.2       PT  19.90 (06-07)  20.10 (06-06)  17.20 (06-05)    INR  1.74 (06-07)  1.76 (06-06)  1.50 (06-05)    PTT  45.6 (06-07)  40.4 (06-06)  41.0 (06-05)      06-07    135  |  109  |  51<H>  ----------------------------<  81  4.9   |  14<L>  |  4.1<HH>    Ca    7.5<L>      07 Jun 2019 01:20  Phos  6.7     06-07  Mg     1.8     06-07    TPro  4.8<L>  /  Alb  1.3<L>  /  TBili  <0.2  /  DBili  0.2  /  AST  12  /  ALT  9   /  AlkPhos  189<H>  06-06      PT/INR - ( 07 Jun 2019 01:20 )   PT: 19.90 sec;   INR: 1.74 ratio         PTT - ( 07 Jun 2019 01:20 )  PTT:45.6 sec  CARDIAC MARKERS ( 05 Jun 2019 11:15 )  x     / 0.08 ng/mL / 8 U/L / x     / 1.5 ng/mL    Culture - Blood (collected 05 Jun 2019 04:37)  Source: .Blood None  Preliminary Report (06 Jun 2019 14:01):    No growth to date. MARGE BELLA  [id number]  58y Male    Indication for ICU admission:  Admit Date:06-04-19  ICU Date:6/4/19  OR Date:6/4/19    sulfamethizole (Unknown)    PAST MEDICAL & SURGICAL HISTORY:  Hypertension  Suprapubic catheter  Pressure ulcer  Diabetes  Lumbar compression fracture  S/P debridement  Toe amputation status, right  H/O hernia repair    Home Medications:  gabapentin 800 mg oral tablet: 1 tab(s) orally 3 times a day (27 Jul 2018 01:14)  methadone: 10 milligram(s) orally once a day (04 Jun 2019 02:46)  oxyCODONE 30 mg oral tablet: 1 tab(s) orally 4 times a day (03 Aug 2018 14:30)  Testim 1% (50 mg/5 g) transdermal gel: 1 packet(s) transdermal once a day (in the morning) (27 Jul 2018 01:14)  Valium 10 mg oral tablet: orally 2 times a day (27 Jul 2018 01:14)  Xanax 2 mg oral tablet: orally once (at bedtime) (27 Jul 2018 01:14)    24HRS EVENT:    Bout of hypotension with SBP in 80s. NICOM negative @ 4.7, Hypotension most likely due to pain meds. Culture of med ankle showed E. Coli. IVL. LFTs WNL.     Neurologic:   Pain control, on home Methadone and Oxycodone; Morphine prn  Anxiety: Xanax, Valium  Paraplegic    Respiratory: RA, refusing IS  AM  CXR pending    Cardiovascular:     Hypotension-non responsive on NICOM, 2/2 pain rx adminstration, stagger rx    ECHO: EF 65-70%    Hx of HTN: Pt no longer taking BP medications at home after weight loss    Gastrointestinal/Nutrition:     No acute diagnosis. Carb consistent diet.   Encourage PO intake.     PPI. Bowel regimen. IVL.     Renal/Genitourinary:    SILVINA 4, trending down, cont to monitor   Renal U/S: B/l echogenic kidneys compatible with medical renal disease.   Suprapubic catheter in place. Output 40-50cc/hr    Hematologic:    H/H stable  Transfuse if hgb < 7 as per primary team.   DVT prophylaxis-HSQ    INR elevated to 1.76. LFTs WNL.     Infectious Disease:   Necrotizing fasciitis s/p R BKA.     WBC 9-->11-->12    +ORSA on contact    Abx-simón and vanco (lvl 15 on 6/5), clinda    Cultures-    R Ankle Culture 6/4-E. Coli sensitive to simón, current abx    BC 6/54-NGTD **prelim**    Stage 4 Sacral decubitus & b/l buttock ulcers. Wound care. Followed by burn (Dr. Gooden last consult 6/4    Musculoskeletal: R BKA, dressing in place, not saturated. Dressing changed by vascular (Roya) 6/5.   Pulse checks Q4h. Can be out of bed with assistance as per primary team.   OR Monday for revision    Endocrine: DM, not on any medications at home. \  ISS. FS monitoring. Hypoglycemic due to poor PO intake.     ***Tubes/Lines/Drains  ***  Peripheral IV  Urinary Catheter	Yes, Suprapubic	Indication: Strict I&O    Date Placed:     Review Of Systems:  [X ] A ten-point review of systems was otherwise negative except as noted above.  [ ] Due to altered mental status/intubation, subjective information were not able to be obtained from the patient. History was obtained, to the extent possible, from review of the chart, clinical exam and collateral sources of information.    Daily     Daily     Diet, Consistent Carbohydrate/No Snacks (06-05-19 @ 09:19)      CURRENT MEDS:  Neurologic Medications  ALPRAZolam 2 milliGRAM(s) Oral at bedtime  diazepam    Tablet 10 milliGRAM(s) Oral two times a day  methadone    Tablet 10 milliGRAM(s) Oral daily  morphine  - Injectable 2 milliGRAM(s) IV Push every 2 hours PRN Severe Pain (7 - 10)  oxyCODONE    IR 30 milliGRAM(s) Oral every 12 hours PRN Moderate Pain (4 - 6)    Respiratory Medications    Cardiovascular Medications    Gastrointestinal Medications  dextrose 5%. 1000 milliLiter(s) IV Continuous <Continuous>  docusate sodium 100 milliGRAM(s) Oral three times a day  pantoprazole    Tablet 40 milliGRAM(s) Oral before breakfast  senna 2 Tablet(s) Oral daily PRN Constipation    Genitourinary Medications    Hematologic/Oncologic Medications  heparin  Injectable 5000 Unit(s) SubCutaneous every 8 hours    Antimicrobial/Immunologic Medications  clindamycin IVPB 900 milliGRAM(s) IV Intermittent every 8 hours  meropenem  IVPB 1000 milliGRAM(s) IV Intermittent every 24 hours  vancomycin  IVPB 1000 milliGRAM(s) IV Intermittent every 24 hours    Endocrine/Metabolic Medications  dextrose 50% Injectable 25 Gram(s) IV Push once  glucagon  Injectable 1 milliGRAM(s) IntraMuscular once PRN Glucose LESS THAN 70 milligrams/deciliter  insulin lispro (HumaLOG) corrective regimen sliding scale   SubCutaneous three times a day before meals    Topical/Other Medications  chlorhexidine 4% Liquid 1 Application(s) Topical <User Schedule>  naloxone Injectable 0.4 milliGRAM(s) IV Push once PRN OVERDOSE, DO NOT GIVE WITHOUT PROVIDER APPROVAL      ICU Vital Signs Last 24 Hrs  T(C): 36.1 (07 Jun 2019 00:00), Max: 36.6 (06 Jun 2019 16:00)  T(F): 97 (07 Jun 2019 00:00), Max: 97.9 (06 Jun 2019 16:00)  HR: 86 (07 Jun 2019 07:00) (84 - 96)  BP: 99/64 (07 Jun 2019 07:00) (86/51 - 117/73)  BP(mean): 78 (07 Jun 2019 07:00) (61 - 95)  ABP: 58/46 (06 Jun 2019 17:00) (58/46 - 150/76)  ABP(mean): 54 (06 Jun 2019 17:00) (54 - 98)  RR: 17 (07 Jun 2019 07:00) (8 - 21)  SpO2: 95% (07 Jun 2019 07:00) (93% - 100%)      Adult Advanced Hemodynamics Last 24 Hrs  CVP(mm Hg): --  CVP(cm H2O): --  CO: --  CI: --  PA: --  PA(mean): --  PCWP: --  SVR: --  SVRI: --  PVR: --  PVRI: --          I&O's Summary    06 Jun 2019 07:01  -  07 Jun 2019 07:00  --------------------------------------------------------  IN: 900 mL / OUT: 1545 mL / NET: -645 mL      I&O's Detail    06 Jun 2019 07:01  -  07 Jun 2019 07:00  --------------------------------------------------------  IN:    IV PiggyBack: 400 mL    Sodium Chloride 0.9% IV Bolus: 500 mL  Total IN: 900 mL    OUT:    Indwelling Catheter - Suprapubic: 1545 mL  Total OUT: 1545 mL    Total NET: -645 mL    PHYSICAL EXAM:    General/Neuro:  A&Ox3, Paraplegic, Found sleeping comfortably    Lungs:   Anterior upper lungs clear to auscultation b/l, Normal expansion/effort.     Cardiovascular:  S1, S2.  Regular rate and rhythm.  BP soft with SBP in low 100s. No peripheral edema.   Cardiac Rhythm: Normal Sinus Rhythm    GI:   Abdomen soft, Non-tender, Non-distended.  BM yesterday.    Extremities: RLE s/p BKA in ace wrap c/d/i. LLE dressing of ulcer c/d/i.    Derm: No open wounds.     : Posadas catheter in place.    CXR: Negative CXR from 6/6    LABS:  CAPILLARY BLOOD GLUCOSE    POCT Blood Glucose.: 102 mg/dL (07 Jun 2019 00:07)  POCT Blood Glucose.: 65 mg/dL (06 Jun 2019 22:57)  POCT Blood Glucose.: 67 mg/dL (06 Jun 2019 22:25)  POCT Blood Glucose.: 74 mg/dL (06 Jun 2019 17:05)  POCT Blood Glucose.: 78 mg/dL (06 Jun 2019 11:34)                          7.7    13.16 )-----------( 409      ( 07 Jun 2019 01:20 )             25.2       PT  19.90 (06-07)  20.10 (06-06)  17.20 (06-05)    INR  1.74 (06-07)  1.76 (06-06)  1.50 (06-05)    PTT  45.6 (06-07)  40.4 (06-06)  41.0 (06-05)      06-07    135  |  109  |  51<H>  ----------------------------<  81  4.9   |  14<L>  |  4.1<HH>    Ca    7.5<L>      07 Jun 2019 01:20  Phos  6.7     06-07  Mg     1.8     06-07    TPro  4.8<L>  /  Alb  1.3<L>  /  TBili  <0.2  /  DBili  0.2  /  AST  12  /  ALT  9   /  AlkPhos  189<H>  06-06      PT/INR - ( 07 Jun 2019 01:20 )   PT: 19.90 sec;   INR: 1.74 ratio         PTT - ( 07 Jun 2019 01:20 )  PTT:45.6 sec  CARDIAC MARKERS ( 05 Jun 2019 11:15 )  x     / 0.08 ng/mL / 8 U/L / x     / 1.5 ng/mL    Culture - Blood (collected 05 Jun 2019 04:37)  Source: .Blood None  Preliminary Report (06 Jun 2019 14:01):    No growth to date. MARGE BELLA  [id number]  58y Male    Indication for ICU admission:  Admit Date:06-04-19  ICU Date:6/4/19  OR Date:6/4/19    sulfamethizole (Unknown)    PAST MEDICAL & SURGICAL HISTORY:  Hypertension  Suprapubic catheter  Pressure ulcer  Diabetes  Lumbar compression fracture  S/P debridement  Toe amputation status, right  H/O hernia repair    Home Medications:  gabapentin 800 mg oral tablet: 1 tab(s) orally 3 times a day (27 Jul 2018 01:14)  methadone: 10 milligram(s) orally once a day (04 Jun 2019 02:46)  oxyCODONE 30 mg oral tablet: 1 tab(s) orally 4 times a day (03 Aug 2018 14:30)  Testim 1% (50 mg/5 g) transdermal gel: 1 packet(s) transdermal once a day (in the morning) (27 Jul 2018 01:14)  Valium 10 mg oral tablet: orally 2 times a day (27 Jul 2018 01:14)  Xanax 2 mg oral tablet: orally once (at bedtime) (27 Jul 2018 01:14)    24HRS EVENT:    Bout of hypotension with SBP in 80s. NICOM negative @ 4.7, Hypotension most likely due to pain meds. Culture of med ankle showed E. Coli. IVL. LFTs WNL.     Neurologic:   Pain control, on home Methadone and Oxycodone; Morphine prn  Anxiety: Xanax, Valium  Paraplegic    Respiratory: RA, refusing IS  AM  CXR pending    Cardiovascular:     Hypotension-non responsive on NICOM, 2/2 pain rx adminstration, stagger rx    ECHO: EF 65-70%    Hx of HTN: Pt no longer taking BP medications at home after weight loss    Gastrointestinal/Nutrition:     No acute diagnosis. Carb consistent diet.   Encourage PO intake.     PPI. Bowel regimen. IVL.     Renal/Genitourinary:    SILVINA 4, trending down, cont to monitor   Renal U/S: B/l echogenic kidneys compatible with medical renal disease.   Suprapubic catheter in place. Output 40-50cc/hr    Hematologic:    H/H stable  Transfuse if hgb < 7 as per primary team.   DVT prophylaxis-HSQ    INR elevated to 1.76. LFTs WNL.     Infectious Disease:   Necrotizing fasciitis s/p R BKA.     WBC 9-->11-->12    +ORSA on contact    Abx-simón and vanco (lvl 15 on 6/5), clinda    Cultures-    R Ankle Culture 6/4-E. Coli sensitive to simón, current abx    BC 6/54-NGTD **prelim**    Stage 4 Sacral decubitus & b/l buttock ulcers. Wound care. Followed by burn (Dr. Gooden last consult 6/4    Musculoskeletal: R BKA, dressing in place, not saturated. Dressing changed by vascular (Roya) 6/5.   Pulse checks Q4h. Can be out of bed with assistance as per primary team.   OR Monday for revision    Endocrine: DM, not on any medications at home. \  ISS. FS monitoring. Hypoglycemic due to poor PO intake.     ***Tubes/Lines/Drains  ***  Peripheral IV  Urinary Catheter	Yes, Suprapubic	Indication: Strict I&O    Date Placed:     Review Of Systems:  [X ] A ten-point review of systems was otherwise negative except as noted above.  [ ] Due to altered mental status/intubation, subjective information were not able to be obtained from the patient. History was obtained, to the extent possible, from review of the chart, clinical exam and collateral sources of information.    Daily     Daily     Diet, Consistent Carbohydrate/No Snacks (06-05-19 @ 09:19)      CURRENT MEDS:  Neurologic Medications  ALPRAZolam 2 milliGRAM(s) Oral at bedtime  diazepam    Tablet 10 milliGRAM(s) Oral two times a day  methadone    Tablet 10 milliGRAM(s) Oral daily  morphine  - Injectable 2 milliGRAM(s) IV Push every 2 hours PRN Severe Pain (7 - 10)  oxyCODONE    IR 30 milliGRAM(s) Oral every 12 hours PRN Moderate Pain (4 - 6)    Respiratory Medications    Cardiovascular Medications    Gastrointestinal Medications  dextrose 5%. 1000 milliLiter(s) IV Continuous <Continuous>  docusate sodium 100 milliGRAM(s) Oral three times a day  pantoprazole    Tablet 40 milliGRAM(s) Oral before breakfast  senna 2 Tablet(s) Oral daily PRN Constipation    Genitourinary Medications    Hematologic/Oncologic Medications  heparin  Injectable 5000 Unit(s) SubCutaneous every 8 hours    Antimicrobial/Immunologic Medications  clindamycin IVPB 900 milliGRAM(s) IV Intermittent every 8 hours  meropenem  IVPB 1000 milliGRAM(s) IV Intermittent every 24 hours  vancomycin  IVPB 1000 milliGRAM(s) IV Intermittent every 24 hours    Endocrine/Metabolic Medications  dextrose 50% Injectable 25 Gram(s) IV Push once  glucagon  Injectable 1 milliGRAM(s) IntraMuscular once PRN Glucose LESS THAN 70 milligrams/deciliter  insulin lispro (HumaLOG) corrective regimen sliding scale   SubCutaneous three times a day before meals    Topical/Other Medications  chlorhexidine 4% Liquid 1 Application(s) Topical <User Schedule>  naloxone Injectable 0.4 milliGRAM(s) IV Push once PRN OVERDOSE, DO NOT GIVE WITHOUT PROVIDER APPROVAL      ICU Vital Signs Last 24 Hrs  T(C): 36.1 (07 Jun 2019 00:00), Max: 36.6 (06 Jun 2019 16:00)  T(F): 97 (07 Jun 2019 00:00), Max: 97.9 (06 Jun 2019 16:00)  HR: 86 (07 Jun 2019 07:00) (84 - 96)  BP: 99/64 (07 Jun 2019 07:00) (86/51 - 117/73)  BP(mean): 78 (07 Jun 2019 07:00) (61 - 95)  ABP: 58/46 (06 Jun 2019 17:00) (58/46 - 150/76)  ABP(mean): 54 (06 Jun 2019 17:00) (54 - 98)  RR: 17 (07 Jun 2019 07:00) (8 - 21)  SpO2: 95% (07 Jun 2019 07:00) (93% - 100%)      Adult Advanced Hemodynamics Last 24 Hrs  CVP(mm Hg): --  CVP(cm H2O): --  CO: --  CI: --  PA: --  PA(mean): --  PCWP: --  SVR: --  SVRI: --  PVR: --  PVRI: --    I&O's Summary    06 Jun 2019 07:01  -  07 Jun 2019 07:00  --------------------------------------------------------  IN: 900 mL / OUT: 1545 mL / NET: -645 mL      I&O's Detail    06 Jun 2019 07:01  -  07 Jun 2019 07:00  --------------------------------------------------------  IN:    IV PiggyBack: 400 mL    Sodium Chloride 0.9% IV Bolus: 500 mL  Total IN: 900 mL    OUT:    Indwelling Catheter - Suprapubic: 1545 mL  Total OUT: 1545 mL    Total NET: -645 mL    PHYSICAL EXAM:    General/Neuro:  A&Ox3, Paraplegic, Found sleeping comfortably in bed    Lungs:   Anterior upper lungs clear to auscultation b/l, Normal expansion/effort.     Cardiovascular:  S1, S2.  Regular rate and rhythm.  BP soft with SBP in low 100s. No peripheral edema.   Cardiac Rhythm: Normal Sinus Rhythm    GI:   Abdomen soft, Non-tender, Non-distended.  BM yesterday.    Extremities: RLE s/p BKA in ace wrap c/d/i. LLE dressing of ulcer c/d/i.    Derm: No open wounds.     : Suprapubic catheter in place.    CXR: Negative CXR from 6/6    LABS:  CAPILLARY BLOOD GLUCOSE    POCT Blood Glucose.: 102 mg/dL (07 Jun 2019 00:07)  POCT Blood Glucose.: 65 mg/dL (06 Jun 2019 22:57)  POCT Blood Glucose.: 67 mg/dL (06 Jun 2019 22:25)  POCT Blood Glucose.: 74 mg/dL (06 Jun 2019 17:05)  POCT Blood Glucose.: 78 mg/dL (06 Jun 2019 11:34)                          7.7    13.16 )-----------( 409      ( 07 Jun 2019 01:20 )             25.2       PT  19.90 (06-07)  20.10 (06-06)  17.20 (06-05)    INR  1.74 (06-07)  1.76 (06-06)  1.50 (06-05)    PTT  45.6 (06-07)  40.4 (06-06)  41.0 (06-05)      06-07    135  |  109  |  51<H>  ----------------------------<  81  4.9   |  14<L>  |  4.1<HH>    Ca    7.5<L>      07 Jun 2019 01:20  Phos  6.7     06-07  Mg     1.8     06-07    TPro  4.8<L>  /  Alb  1.3<L>  /  TBili  <0.2  /  DBili  0.2  /  AST  12  /  ALT  9   /  AlkPhos  189<H>  06-06      PT/INR - ( 07 Jun 2019 01:20 )   PT: 19.90 sec;   INR: 1.74 ratio         PTT - ( 07 Jun 2019 01:20 )  PTT:45.6 sec  CARDIAC MARKERS ( 05 Jun 2019 11:15 )  x     / 0.08 ng/mL / 8 U/L / x     / 1.5 ng/mL    Culture - Blood (collected 05 Jun 2019 04:37)  Source: .Blood None  Preliminary Report (06 Jun 2019 14:01):    No growth to date.

## 2019-06-07 NOTE — PROGRESS NOTE ADULT - ASSESSMENT
ASSESSMENT  58 M w/ history of paraplegia w suprapubic catheter, sacral decubiti followed by Dr. Gooden, PAD, and DM2 BIBA for foul smelling drainage from his RLE, found to be anemic, in SILVINA, & gangrene of RLE s/p right BKA    PLANNeurologic:   A&Ox3. No focal deficits. Paraplegic.  Pain control, on home Methadone and Oxycodone; Morphine prn  Anxiety: Xanax, Valium    Respiratory:   Anterior chest CTA b/l.   No acute diagnosis. Sating well on RA.  Refusing IS this AM. AM CXR stable, low lung volumes from 6/4.     Cardiovascular:   History of hypotension in 90s. Was getting NICOM boluses.  Last NICOM 5.6. Normotensive overnight.   Troponin 0.08 x 3  ECHO: EF 65-70%  Hx of HTN: Pt no longer taking BP medications at home after weight loss    Gastrointestinal/Nutrition:   No acute diagnosis. Carb consistent diet. Was refusing food, but asking for milk this AM. Encourage PO intake.   PPI. Bowel regimen. IVL.     Renal/Genitourinary: SILVINA with Cr 4.0, baseline 1.1 down from 4.3/4.6.   Lytes: K 4.7, Mg 1.8, Phos 6.6  Renal U/S: B/l echogenic kidneys compatible with medical renal disease.  Suprapubic catheter in place. Output is 545 q shift.    Hematologic:   Hgb stabilizing at 7.9   Transfuse if hgb < 7 as per primary team.  Total PRBC transfusion in SICU is 1 PRBC  SQH   INR elevated to 1.76. LFT @ 10:13 am---Alk Phos 189, AST 12, ALT 9, indirect bili <0.0    Infectious Disease:   Necrotizing fasciitis s/p R BKA. WBC was downtrending at 9 from 21, but now going up to 12.06 +ORSA. Possible failure of source control.   Abx: Meropenem, Clinda, Vanco. Last vanc level 15.3. Vanc level due at 20:00 tonight.  Stage 4 Sacral decubitus & b/l buttock ulcers. Wound care. Followed by burn (Dr. Gooden).  Culture from R med ankle showing gram negative rods.  Blood cx NGTD.     Musculoskeletal: R BKA, dressing in place, not saturated.   Pulse checks Q4h. Can be out of bed with assistance as per primary team.  PLAN FOR RETURN TO OR MON for revision.     Endocrine: DM, not on any medications at home. On ISS. FS monitoring. Hypoglycemic due to poor PO intake. Got Dextrose. Last FS 78.    Lines/Tubes:   2 peripheral IVs in R forearm & R shoulder  Suprapubic catheter    Disposition: Downgrade ASSESSMENT  58 M w/ history of paraplegia w suprapubic catheter, sacral decubiti followed by Dr. Gooden, PAD, and DM2 BIBA for foul smelling drainage from his RLE, found to be anemic, in SILVINA, & gangrene of RLE s/p right BKA    PLAN  Neurologic:   A&Ox3. No focal deficits. Paraplegic.  AM Valium given  Pain control, on home Methadone (10mg) and Oxycodone; Morphine prn  Anxiety: Xanax, Valium    Respiratory:   Anterior chest CTA b/l.   No acute diagnosis. Sating well on RA.  AM CXR from 6/6 negative.      Cardiovascular:   Pain meds held last night and BP has been stable in low 100s. Lowest was 98.   Troponin 0.08 x 3  ECHO: EF 65-70%  Hx of HTN: Pt no longer taking BP medications at home after weight loss    Gastrointestinal/Nutrition:   No acute diagnosis. Carb consistent diet.   Encourage PO intake.   PPI. Bowel regimen. IVL.     Renal/Genitourinary: SILVINA with Cr 4.0, baseline 1.1 down from 4.3/4.6.   Lytes: K 4.7, Mg 1.8, Phos 6.6  Renal U/S: B/l echogenic kidneys compatible with medical renal disease.  Suprapubic catheter in place. Output is 545 q shift.    Hematologic:   Hgb at 7.7 from 7.9 6/6  Transfuse if hgb < 7 as per primary team.  Total PRBC transfusion in SICU is 1 PRBC  SQH   INR stable at 1.74/1.76 LFT from 6/6 negative     Infectious Disease:   Necrotizing fasciitis s/p R BKA. WBC uptrending 12.06 -> 13.16. Possible failure of source control.   Culture from R med ankle showing E Coli sensitive to Hesham.  Blood cx NGTD.   Abx: Meropenem, Clinda, Vanco. Last vanc level    Stage 4 Sacral decubitus & b/l buttock ulcers. Wound care. Followed by burn (Dr. Gooden).      Musculoskeletal: R BKA, dressing in place, not saturated.   Pulse checks Q4h. Can be out of bed with assistance as per primary team. Pt refusing to get out of bed.   PLAN FOR RETURN TO OR MON for revision.     Endocrine: DM, not on any medications at home. On ISS. FS monitoring. Hypoglycemic with FS 62 --> PO juice --> FS 65 Got Dextrose --> Last     Lines/Tubes:   2 peripheral IVs in R forearm & R shoulder  Suprapubic catheter    Disposition: Downgrade ASSESSMENT  58 M w/ history of paraplegia w suprapubic catheter, sacral decubiti followed by Dr. Gooden, PAD, and DM2 BIBA for foul smelling drainage from his RLE, found to be anemic, in SILVINA, & gangrene of RLE s/p right BKA    PLAN  Neurologic:   A&Ox3. No focal deficits. Paraplegic.  AM Valium given  Pain control, on home Methadone (10mg) and Oxycodone; Morphine prn  Anxiety: Xanax, Valium    Respiratory:   Anterior chest CTA b/l.   No acute diagnosis. Sating well on RA.  AM CXR from 6/6 negative.      Cardiovascular:   Pain meds held last night and BP has been stable in low 100s. Lowest was 98.   Troponin 0.08 x 3  ECHO: EF 65-70%  Hx of HTN: Pt no longer taking BP medications at home after weight loss    Gastrointestinal/Nutrition:   No acute diagnosis. Carb consistent diet. BM yesterday.    Encourage PO intake.   PPI. Bowel regimen. IVL.     Renal/Genitourinary: SILVINA with Cr 4.0, baseline 1.1 down from 4.3/4.6.   Lytes: K 4.7, Mg 1.8, Phos 6.6  Renal U/S: B/l echogenic kidneys compatible with medical renal disease.  Suprapubic catheter in place. Output is 545 q shift.    Hematologic:   Hgb at 7.7 from 7.9 6/6  Transfuse if hgb < 7 as per primary team.  Total PRBC transfusion in SICU is 1 PRBC  SQH   INR stable at 1.74/1.76 LFT from 6/6 negative     Infectious Disease:   Necrotizing fasciitis s/p R BKA. WBC uptrending 12.06 -> 13.16. Possible failure of source control.   Culture from R med ankle showing E Coli sensitive to Hesham.  Blood cx NGTD.   Abx: Meropenem, Clinda, Vanco. Last vanc level    Stage 4 Sacral decubitus & b/l buttock ulcers. Wound care. Followed by burn (Dr. Gooden).      Musculoskeletal: R BKA, dressing in place, not saturated.   Pulse checks Q4h. Can be out of bed with assistance as per primary team. Pt refusing to get out of bed.   PLAN FOR RETURN TO OR MON for revision.     Endocrine: DM, not on any medications at home. On ISS. FS monitoring. Hypoglycemic with FS 62 --> PO juice --> FS 65 Got Dextrose --> Last     Lines/Tubes:   2 peripheral IVs in R forearm & R shoulder  Suprapubic catheter    Disposition: Downgrade ASSESSMENT  58 M w/ history of paraplegia w suprapubic catheter, sacral decubiti followed by Dr. Gooden, PAD, and DM2 BIBA for foul smelling drainage from his RLE, found to be anemic, in SILVINA, & gangrene of RLE s/p right BKA    PLAN  Neurologic:   A&Ox3. No focal deficits. Paraplegic.  AM Valium given  Pain control, on home Methadone (10mg) and Oxycodone; Morphine prn  Anxiety: Xanax, Valium    Respiratory:   Anterior chest CTA b/l.   No acute diagnosis. Sating well on RA.  AM CXR from 6/6 negative.      Cardiovascular:   Pain meds held last night and BP has been stable in low 100s. Lowest was 98.   Troponin 0.08 x 3  ECHO: EF 65-70%  Hx of HTN: Pt no longer taking BP medications at home after weight loss    Gastrointestinal/Nutrition:   No acute diagnosis. Carb consistent diet. BM yesterday.    Encourage PO intake.   PPI. Bowel regimen. IVL.     Renal/Genitourinary: SILVINA with Cr 4.1, baseline 1.1 down from 4.3/4.6.   Lytes: K 4.9, Mg 1.8, Phos 6.7  Renal U/S: B/l echogenic kidneys compatible with medical renal disease.  Suprapubic catheter in place. Output is 545 q shift.    Hematologic:   Hgb at 7.7 from 7.9 6/6  Transfuse if hgb < 7 as per primary team.  Total PRBC transfusion in SICU is 1 PRBC  SQH   INR stable at 1.74/1.76 LFT from 6/6 negative     Infectious Disease:   Necrotizing fasciitis s/p R BKA. WBC uptrending 12.06 -> 13.16. Possible failure of source control.   Culture from R med ankle showing E Coli sensitive to Hesham.  Blood cx NGTD.   Abx: Meropenem, Clinda, Vanco. Last vanc level      Stage 4 Sacral decubitus & b/l buttock ulcers. Wound care. Followed by burn (Dr. Gooden).      Musculoskeletal: R BKA, dressing in place, not saturated.   Pulse checks Q4h. Can be out of bed with assistance as per primary team. Pt refusing to get out of bed.   PLAN FOR RETURN TO OR MON for revision.     Endocrine: DM, not on any medications at home. On ISS. FS monitoring. Hypoglycemic with FS 62 --> PO juice --> FS 65 Got Dextrose --> Last     Lines/Tubes:   2 peripheral IVs in R forearm & R shoulder  Suprapubic catheter    Disposition: Downgrade ASSESSMENT  58 M w/ history of paraplegia w suprapubic catheter, sacral decubiti followed by Dr. Gooden, PAD, and DM2 BIBA for foul smelling drainage from his RLE, found to be anemic, in SILVINA, & gangrene of RLE s/p right BKA    PLAN  Neurologic:   A&Ox3. No focal deficits. Paraplegic.  AM Valium given  Pain control, on home Methadone (10mg) and Oxycodone; Morphine prn  Anxiety: Xanax, Valium    Respiratory:   Anterior chest CTA b/l.   No acute diagnosis. Sating well on RA.  AM CXR from 6/6 negative.      Cardiovascular:   Valium held last night and BP has been stable in low 100s. Lowest was 98.   Troponin 0.08 x 3  ECHO: EF 65-70%  Hx of HTN: Pt no longer taking BP medications at home after weight loss    Gastrointestinal/Nutrition:   No acute diagnosis. Carb consistent diet. BM yesterday.    Encourage PO intake.   PPI. Bowel regimen. IVL.     Renal/Genitourinary: SILVINA with Cr 4.1, baseline 1.1 down from 4.3/4.6.   Lytes: K 4.9, Mg 1.8, Phos 6.7  Renal U/S: B/l echogenic kidneys compatible with medical renal disease.  Suprapubic catheter in place. Output is 545 q shift.    Hematologic:   Hgb at 7.7 from 7.9 6/6  Transfuse if hgb < 7 as per primary team.  Total PRBC transfusion in SICU is 1 PRBC  SQH   INR stable at 1.74/1.76. LFTs from 6/6 negative     Infectious Disease:   Necrotizing fasciitis s/p R BKA. WBC uptrending 12.06 -> 13.16. Possible failure of source control.   Culture from R med ankle showing E Coli sensitive to Hesham.  Blood cx NGTD.   Abx: Meropenem, Clinda, Vanco. Last vanc level 15.3.     Stage 4 Sacral decubitus & b/l buttock ulcers. Wound care. Followed by burn (Dr. Gooden).      Musculoskeletal: R BKA, dressing in place, not saturated.   Pulse checks Q4h. Can be out of bed with assistance as per primary team. Pt refusing to get out of bed.   PLAN FOR RETURN TO OR MON for revision.     Endocrine: DM, not on any medications at home. On ISS. FS monitoring. Hypoglycemic with FS 62 --> PO juice --> FS 65 Got Dextrose --> Last     Lines/Tubes:   2 peripheral IVs in R forearm & R shoulder  Suprapubic catheter    Disposition: Downgrade

## 2019-06-07 NOTE — PROGRESS NOTE ADULT - SUBJECTIVE AND OBJECTIVE BOX
Patient: MARGE BELLA , 58y (1960)Male     Procedure/Diagnosis: S/p; right BKA  Events over 24h: No acute overnight events, patient seen and examined bedside with no active complaints.     Vitals: T(F): 97 (06-07-19 @ 00:00), Max: 97.9 (06-06-19 @ 16:00)  HR: 84 (06-07-19 @ 01:00)  BP: 99/62 (06-07-19 @ 01:00) (86/51 - 109/67)  RR: 17 (06-07-19 @ 01:00)  SpO2: 94% (06-07-19 @ 01:00)    In:   06-05-19 @ 07:01  -  06-06-19 @ 07:00  --------------------------------------------------------  IN: 2450 mL    06-06-19 @ 07:01  -  06-07-19 @ 06:18  --------------------------------------------------------  IN: 850 mL      Out:   06-05-19 @ 07:01  -  06-06-19 @ 07:00  --------------------------------------------------------  OUT:    Indwelling Catheter - Suprapubic: 1335 mL  Total OUT: 1335 mL      06-06-19 @ 07:01  -  06-07-19 @ 06:18  --------------------------------------------------------  OUT:    Indwelling Catheter - Suprapubic: 1255 mL  Total OUT: 1255 mL        Net:   06-05-19 @ 07:01  -  06-06-19 @ 07:00  --------------------------------------------------------  NET: 1115 mL    06-06-19 @ 07:01  -  06-07-19 @ 06:18  --------------------------------------------------------  NET: -405 mL      Diet: Diet, Consistent Carbohydrate/No Snacks (06-05-19 @ 09:19)    IV Fluids: Type: dextrose 5%. 1000 milliLiter(s) (50 mL/Hr) IV Continuous <Continuous>    Physical Examination:  General Appearance: NAD, alert and cooperative  HEENT: NCAT, WNL  Heart: S1 and S2. No murmurs. RRR  Lungs: CTABL  Abdomen: Soft, nondistended, nontender  Extremities: peripheral pulses 2+, no peripheral edema, full ROM    Medications: [Standing]  ALPRAZolam 2 milliGRAM(s) Oral at bedtime  chlorhexidine 4% Liquid 1 Application(s) Topical <User Schedule>  clindamycin IVPB 900 milliGRAM(s) IV Intermittent every 8 hours  dextrose 5%. 1000 milliLiter(s) (50 mL/Hr) IV Continuous <Continuous>  dextrose 50% Injectable 25 Gram(s) IV Push once  diazepam    Tablet 10 milliGRAM(s) Oral two times a day  docusate sodium 100 milliGRAM(s) Oral three times a day  heparin  Injectable 5000 Unit(s) SubCutaneous every 8 hours  insulin lispro (HumaLOG) corrective regimen sliding scale   SubCutaneous three times a day before meals  meropenem  IVPB 1000 milliGRAM(s) IV Intermittent every 24 hours  methadone    Tablet 10 milliGRAM(s) Oral daily  pantoprazole    Tablet 40 milliGRAM(s) Oral before breakfast  vancomycin  IVPB 1000 milliGRAM(s) IV Intermittent every 24 hours    DVT Prophylaxis: heparin  Injectable 5000 Unit(s) SubCutaneous every 8 hours    GI Prophylaxis: pantoprazole    Tablet 40 milliGRAM(s) Oral before breakfast    Antibiotics: clindamycin IVPB 900 milliGRAM(s) IV Intermittent every 8 hours  meropenem  IVPB 1000 milliGRAM(s) IV Intermittent every 24 hours  vancomycin  IVPB 1000 milliGRAM(s) IV Intermittent every 24 hours    Anticoagulation:   Medications:[PRN]  glucagon  Injectable 1 milliGRAM(s) IntraMuscular once PRN  morphine  - Injectable 2 milliGRAM(s) IV Push every 2 hours PRN  naloxone Injectable 0.4 milliGRAM(s) IV Push once PRN  oxyCODONE    IR 30 milliGRAM(s) Oral every 12 hours PRN  senna 2 Tablet(s) Oral daily PRN    Labs:                        7.7    13.16 )-----------( 409      ( 07 Jun 2019 01:20 )             25.2     06-07    135  |  109  |  51<H>  ----------------------------<  81  4.9   |  14<L>  |  4.1<HH>    Ca    7.5<L>      07 Jun 2019 01:20  Phos  6.7     06-07  Mg     1.8     06-07    TPro  4.8<L>  /  Alb  1.3<L>  /  TBili  <0.2  /  DBili  0.2  /  AST  12  /  ALT  9   /  AlkPhos  189<H>  06-06    LIVER FUNCTIONS - ( 06 Jun 2019 10:13 )  Alb: 1.3 g/dL / Pro: 4.8 g/dL / ALK PHOS: 189 U/L / ALT: 9 U/L / AST: 12 U/L / GGT: x           PT/INR - ( 07 Jun 2019 01:20 )   PT: 19.90 sec;   INR: 1.74 ratio         PTT - ( 07 Jun 2019 01:20 )  PTT:45.6 sec    CARDIAC MARKERS ( 05 Jun 2019 11:15 )  x     / 0.08 ng/mL / 8 U/L / x     / 1.5 ng/mL      Urine/Micro:    Culture - Blood (collected 05 Jun 2019 04:37)  Source: .Blood None  Preliminary Report (06 Jun 2019 14:01):    No growth to date.

## 2019-06-08 NOTE — PROGRESS NOTE ADULT - ASSESSMENT
58y [facility]  aferbile, stable     Plan:  Diet CC  Monitor BP  Pain control  f/u closure date   labs

## 2019-06-08 NOTE — PROCEDURE NOTE - NSPROCDETAILS_GEN_ALL_CORE
location identified, draped/prepped, sterile technique used/sterile dressing applied/sterile technique, catheter placed
sutured in place/all materials/supplies accounted for at end of procedure/location identified, draped/prepped, sterile technique used, needle inserted/introduced/connected to a pressurized flush line/hemostasis with direct pressure, dressing applied/Seldinger technique/ultrasound guidance/positive blood return obtained via catheter

## 2019-06-08 NOTE — PROGRESS NOTE ADULT - ASSESSMENT
58 M w/ PMH of Paraplegia 2/2 Lumbar compression Fx, sacral decubiti and heel ulcers s/p debridements,, PAD, suprapubic cath,Neuropathy, DM2 & HTN that resolved w/ weight loss, is BIBA c/o foul smell & drainage from his RLE, found to be anemic, in SILVINA, & gangrene of RLE. Renal is consulted for SILVINA. sp right BKA  # creatinine stable, not improving  # patient with significant decrease in GFR , in view of recent BKA, and low muscle mass  # non oliguric   # decrease simón to 500 q 12  # decrease po intake, start 1/2 ns with 75 meq of bicarbonate at 75 cc/h  # start sodium bicarbonate 650 q 8  # ph noted, corrected calcium around 9.2, start renagel 2 tablets po q 8 with meals  # check pr/cr ratio  # h/h noted, check iron stores,tx if h <7  #   no acute indication for RRT  # will follow

## 2019-06-08 NOTE — PROGRESS NOTE ADULT - SUBJECTIVE AND OBJECTIVE BOX
seen and examined  no distress  lying comfortable       Standing Inpatient Medications  ALPRAZolam 2 milliGRAM(s) Oral at bedtime  chlorhexidine 4% Liquid 1 Application(s) Topical <User Schedule>  clindamycin IVPB 900 milliGRAM(s) IV Intermittent every 8 hours  dextrose 5%. 1000 milliLiter(s) IV Continuous <Continuous>  dextrose 50% Injectable 25 Gram(s) IV Push once  diazepam    Tablet 10 milliGRAM(s) Oral two times a day  docusate sodium 100 milliGRAM(s) Oral three times a day  heparin  Injectable 5000 Unit(s) SubCutaneous every 8 hours  insulin lispro (HumaLOG) corrective regimen sliding scale   SubCutaneous three times a day before meals  meropenem  IVPB 1000 milliGRAM(s) IV Intermittent every 24 hours  methadone    Tablet 10 milliGRAM(s) Oral daily  oxybutynin 5 milliGRAM(s) Oral daily  pantoprazole    Tablet 40 milliGRAM(s) Oral before breakfast    VITALS/PHYSICAL EXAM  --------------------------------------------------------------------------------  T(C): 35.6 (06-08-19 @ 04:43), Max: 36.5 (06-08-19 @ 00:00)  HR: 88 (06-08-19 @ 05:56) (82 - 90)  BP: 118/70 (06-08-19 @ 05:56) (90/56 - 119/71)  RR: 18 (06-08-19 @ 05:56) (7 - 18)  SpO2: 100% (06-08-19 @ 00:00) (97% - 100%)  Wt(kg): --  Height (cm): 185.42 (06-08-19 @ 04:43)  Weight (kg): 78.6 (06-08-19 @ 04:43)  BMI (kg/m2): 22.9 (06-08-19 @ 04:43)  BSA (m2): 2.02 (06-08-19 @ 04:43)      06-07-19 @ 07:01  -  06-08-19 @ 07:00  --------------------------------------------------------  IN: 150 mL / OUT: 1225 mL / NET: -1075 mL      Physical Exam:  	Gen: NAD  	Pulm: decrease BS B/L  	CV: S1S2; no rub  	Abd: +distended  	: SPC  	LE: bka       LABS/STUDIES  --------------------------------------------------------------------------------              8.1    11.25 >-----------<  455      [06-08-19 @ 00:05]              26.1     138  |  113  |  52  ----------------------------<  68      [06-08-19 @ 00:05]  5.2   |  15  |  4.2        Ca     7.9     [06-08-19 @ 00:05]      Mg     1.7     [06-08-19 @ 00:05]      Phos  7.0     [06-08-19 @ 00:05]    TPro  4.8  /  Alb  1.3  /  TBili  <0.2  /  DBili  0.2  /  AST  12  /  ALT  9   /  AlkPhos  189  [06-06-19 @ 10:13]    PT/INR: PT 21.40, INR 1.87       [06-08-19 @ 00:05]  PTT: 48.3       [06-08-19 @ 00:05]      Creatinine Trend:  SCr 4.2 [06-08 @ 00:05]  SCr 4.1 [06-07 @ 01:20]  SCr 4.0 [06-06 @ 00:30]  SCr 4.2 [06-05 @ 00:13]  SCr 4.3 [06-04 @ 12:12]    Urinalysis - [06-05-19 @ 00:13]      Color Yellow / Appearance Clear / SG 1.010 / pH 6.5      Gluc Negative / Ketone Negative  / Bili Negative / Urobili 0.2       Blood Trace / Protein 100 / Leuk Est Small / Nitrite Negative      RBC 1-2 / WBC 6-10 / Hyaline  / Gran  / Sq Epi  / Non Sq Epi Occasional / Bacteria Few    Urine Creatinine 39      [06-05-19 @ 00:13]  Urine Protein 263      [06-05-19 @ 00:13]  Urine Sodium 38.0      [06-05-19 @ 00:13]  Urine Potassium 13      [06-04-19 @ 18:10]  Urine Chloride 26      [06-05-19 @ 00:13]  Urine Osmolality 275      [06-05-19 @ 00:13]    HbA1c 6.1      [06-04-19 @ 05:01]    HCV 0.11, Nonreact      [06-04-19 @ 05:01]

## 2019-06-08 NOTE — PROGRESS NOTE ADULT - SUBJECTIVE AND OBJECTIVE BOX
GENERAL SURGERY PROGRESS NOTE     MARGE BELLA  58y  Male  Hospital day :4d  POD:  Procedure: Guillotine amputation below knee    OVERNIGHT EVENTS:  downgraded from ICU, stable.  Febrile.     T(F): 96.1 (06-08-19 @ 04:43), Max: 97.7 (06-08-19 @ 00:00)  HR: 88 (06-08-19 @ 05:56) (82 - 90)  BP: 118/70 (06-08-19 @ 05:56) (90/56 - 119/71)  RR: 18 (06-08-19 @ 05:56) (7 - 18)  SpO2: 100% (06-08-19 @ 00:00) (95% - 100%)    DIET/FLUIDS: dextrose 5%. 1000 milliLiter(s) IV Continuous <Continuous>       GI proph:  pantoprazole    Tablet 40 milliGRAM(s) Oral before breakfast    AC/ proph: heparin  Injectable 5000 Unit(s) SubCutaneous every 8 hours    ABx: clindamycin IVPB 900 milliGRAM(s) IV Intermittent every 8 hours  meropenem  IVPB 1000 milliGRAM(s) IV Intermittent every 24 hours      PHYSICAL EXAM:  GENERAL: NAD, well-appearing  CHEST/LUNG: Clear to auscultation bilaterally  HEART: Regular rate and rhythm  ABDOMEN: Soft, Nontender, Nondistended;   EXTREMITIES:  guillatine amputation, no bleeding.      LABS  Labs:  CAPILLARY BLOOD GLUCOSE      POCT Blood Glucose.: 288 mg/dL (08 Jun 2019 01:23)  POCT Blood Glucose.: 69 mg/dL (08 Jun 2019 00:16)  POCT Blood Glucose.: 72 mg/dL (07 Jun 2019 19:02)  POCT Blood Glucose.: 67 mg/dL (07 Jun 2019 17:39)  POCT Blood Glucose.: 112 mg/dL (07 Jun 2019 11:38)  POCT Blood Glucose.: 73 mg/dL (07 Jun 2019 08:41)                          8.1    11.25 )-----------( 455      ( 08 Jun 2019 00:05 )             26.1         06-08    138  |  113<H>  |  52<H>  ----------------------------<  68<L>  5.2<H>   |  15<L>  |  4.2<HH>      Calcium, Total Serum: 7.9 mg/dL (06-08-19 @ 00:05)      LFTs:             4.8  | <0.2 | 12       ------------------[189     ( 06 Jun 2019 10:13 )  1.3  | 0.2  | 9              Lactate, Blood: 0.4 mmol/L (06-05-19 @ 06:49)    ABG - ( 04 Jun 2019 12:33 )  pH: 7.34  /  pCO2: 34    /  pO2: 35    / HCO3: 19    / Base Excess: -6.4  /  SaO2: 67          Coags:     21.40  ----< 1.87    ( 08 Jun 2019 00:05 )     48.3                        RADIOLOGY & ADDITIONAL TESTS:  < from: Xray Chest 1 View- PORTABLE-Routine (06.06.19 @ 05:42) >  Impression:      No radiographic evidence of acute cardiopulmonary disease.    Stable exam.    < end of copied text >      A/P

## 2019-06-09 NOTE — PROGRESS NOTE ADULT - ASSESSMENT
Assessment:  58y Male patient admitted s/p right daphne RAFI 6/4    Plan:  -home meds  -pain control  -GI/DVT ppx  -encourage ambulation  -incentive spirometer  -regular diet  -preop for OR tomorrow for closure of amputation site    Date/Time: 06-09-19 @ 04:45

## 2019-06-09 NOTE — PROVIDER CONTACT NOTE (OTHER) - SITUATION
Patient reported constipation.   Enema ordered, when turning pt blood noted.  Md. Kumar called at [phone].  Md. Kumar assessed situation, spoke  w fellow.  Md. Vogel states admin enema to pt.

## 2019-06-09 NOTE — PROGRESS NOTE ADULT - SUBJECTIVE AND OBJECTIVE BOX
Nephrology progress note    Patient is seen and examined, events over the last 24 h noted .    Allergies:  sulfamethizole (Unknown)    Hospital Medications:   MEDICATIONS  (STANDING):  ALPRAZolam 2 milliGRAM(s) Oral at bedtime  chlorhexidine 4% Liquid 1 Application(s) Topical <User Schedule>  clindamycin IVPB 900 milliGRAM(s) IV Intermittent every 8 hours  dextrose 5% + sodium chloride 0.45%. 1000 milliLiter(s) (50 mL/Hr) IV Continuous <Continuous>  dextrose 5%. 1000 milliLiter(s) (50 mL/Hr) IV Continuous <Continuous>  dextrose 50% Injectable 25 Gram(s) IV Push once  diazepam    Tablet 10 milliGRAM(s) Oral two times a day  docusate sodium 100 milliGRAM(s) Oral three times a day  heparin  Injectable 5000 Unit(s) SubCutaneous every 8 hours  insulin lispro (HumaLOG) corrective regimen sliding scale   SubCutaneous three times a day before meals  meropenem  IVPB 1000 milliGRAM(s) IV Intermittent every 24 hours  methadone    Tablet 10 milliGRAM(s) Oral daily  oxybutynin 5 milliGRAM(s) Oral daily  pantoprazole    Tablet 40 milliGRAM(s) Oral before breakfast        VITALS:  T(F): 96.6 (19 @ 05:00), Max: 97.1 (19 @ 21:32)  HR: 83 (19 @ 05:00)  BP: 108/61 (19 @ 05:00)  RR: 18 (19 @ 05:00)  SpO2: --  Wt(kg): --     @ 07:  -   @ 07:00  --------------------------------------------------------  IN: 150 mL / OUT: 1225 mL / NET: -1075 mL     @ 07:01  -   @ 07:00  --------------------------------------------------------  IN: 0 mL / OUT: 1500 mL / NET: -1500 mL          PHYSICAL EXAM:  Constitutional: NAD  HEENT: anicteric sclera, oropharynx clear, MMM  Neck: No JVD  Respiratory: CTAB, no wheezes, rales or rhonchi  Cardiovascular: S1, S2, RRR  Gastrointestinal: BS+, soft, NT/ND  Extremities: No cyanosis or clubbing. No peripheral edema  :  No brown.   Skin: No rashes    LABS:      138  |  113<H>  |  52<H>  ----------------------------<  68<L>  5.2<H>   |  15<L>  |  4.2<HH>    Ca    7.9<L>      2019 00:05  Phos  7.0       Mg     1.7                                 8.1    11.25 )-----------( 455      ( 2019 00:05 )             26.1       Urine Studies:  Urinalysis Basic - ( 2019 00:13 )    Color: Yellow / Appearance: Clear / S.010 / pH:   Gluc:  / Ketone: Negative  / Bili: Negative / Urobili: 0.2 mg/dL   Blood:  / Protein: 100 mg/dL / Nitrite: Negative   Leuk Esterase: Small / RBC: 1-2 /HPF / WBC 6-10 /HPF   Sq Epi:  / Non Sq Epi: Occasional /HPF / Bacteria: Few /HPF      Protein/Creatinine Ratio Calculation: 7 Ratio ( @ 00:13)  Creatinine, Random Urine: 39 mg/dL ( @ 00:13)  Chloride, Random Urine: 26 ( @ 00:13)  Sodium, Random Urine: 38.0 mmoL/L ( @ 00:13)  Osmolality, Random Urine: 275 mos/kg ( @ 00:13)  Creatinine, Random Urine: 16 mg/dL ( @ 18:10)  Potassium, Random Urine: 13 mmol/L ( @ 18:10)  Sodium, Random Urine: 33.0 mmoL/L ( @ 18:10)    RADIOLOGY & ADDITIONAL STUDIES: Nephrology progress note    Patient is seen and examined, events over the last 24 h noted .  has back pain   denied any other complaints     Allergies:  sulfamethizole (Unknown)    Hospital Medications:   MEDICATIONS  (STANDING):  ALPRAZolam 2 milliGRAM(s) Oral at bedtime  clindamycin IVPB 900 milliGRAM(s) IV Intermittent every 8 hours  diazepam    Tablet 10 milliGRAM(s) Oral two times a day  docusate sodium 100 milliGRAM(s) Oral three times a day  heparin  Injectable 5000 Unit(s) SubCutaneous every 8 hours  insulin lispro (HumaLOG) corrective regimen sliding scale   SubCutaneous three times a day before meals  meropenem  IVPB 1000 milliGRAM(s) IV Intermittent every 24 hours  methadone    Tablet 10 milliGRAM(s) Oral daily  oxybutynin 5 milliGRAM(s) Oral daily  pantoprazole    Tablet 40 milliGRAM(s) Oral before breakfast        VITALS:  T(F): 96.6 (19 @ 05:00), Max: 97.1 (19 @ 21:32)  HR: 83 (19 @ 05:00)  BP: 108/61 (19 @ 05:00)  RR: 18 (19 @ 05:00)     @ 07:01  -   @ 07:00  --------------------------------------------------------  IN: 150 mL / OUT: 1225 mL / NET: -1075 mL     @ 07:01  -   @ 07:00  --------------------------------------------------------  IN: 0 mL / OUT: 1500 mL / NET: -1500 mL          PHYSICAL EXAM:  Constitutional: complaining of back pain   HEENT: anicteric sclera, oropharynx clear, MMM  Neck: No JVD  Respiratory: CTAB, no wheezes, rales or rhonchi  Cardiovascular: S1, S2, RRR  Gastrointestinal: BS+, soft, NT/ND  Extremities: No cyanosis or clubbing. right BKA   :  SPC   Skin: No rashes    LABS:      138  |  113<H>  |  52<H>  ----------------------------<  68<L>  5.2<H>   |  15<L>  |  4.2<HH>  Creatinine Trend: 4.2<--, 4.1<--, 4.0<--, 4.2<--, 4.3<--, 4.6<--  Ca    7.9<L>      2019 00:05  Phos  7.0       Mg     1.7                                 8.1    11.25 )-----------( 455      ( 2019 00:05 )             26.1       Urine Studies:  Urinalysis Basic - ( 2019 00:13 )    Color: Yellow / Appearance: Clear / S.010 / pH:   Gluc:  / Ketone: Negative  / Bili: Negative / Urobili: 0.2 mg/dL   Blood:  / Protein: 100 mg/dL / Nitrite: Negative   Leuk Esterase: Small / RBC: 1-2 /HPF / WBC 6-10 /HPF   Sq Epi:  / Non Sq Epi: Occasional /HPF / Bacteria: Few /HPF      Protein/Creatinine Ratio Calculation: 7 Ratio ( @ 00:13)  Creatinine, Random Urine: 39 mg/dL ( @ 00:13)  Chloride, Random Urine: 26 ( @ 00:13)  Sodium, Random Urine: 38.0 mmoL/L ( @ 00:13)  Osmolality, Random Urine: 275 mos/kg ( @ 00:13)  Creatinine, Random Urine: 16 mg/dL ( @ 18:10)  Potassium, Random Urine: 13 mmol/L ( @ 18:10)  Sodium, Random Urine: 33.0 mmoL/L ( @ 18:10)    RADIOLOGY & ADDITIONAL STUDIES:

## 2019-06-09 NOTE — PROGRESS NOTE ADULT - ASSESSMENT
58 M w/ PMH of Paraplegia 2/2 Lumbar compression Fx, sacral decubiti and heel ulcers s/p debridements,, PAD, suprapubic cath,Neuropathy, DM2 & HTN that resolved w/ weight loss, is BIBA c/o foul smell & drainage from his RLE, found to be anemic, in SILVINA, & gangrene of RLE. Renal is consulted for SILVINA. sp right BKA  # creatinine stable, not improving  # patient with significant decrease in GFR , in view of recent BKA, and low muscle mass  # non oliguric   # decrease simón to 500 q 12  # will  start sodium bicarbonate 650 q 8  # ph noted, corrected calcium around 9.2, start renagel 2 tablets po q 8 with meals  # check pr/cr ratio  # h/h noted, check iron stores,tx if h <7  #   no acute indication for RRT  # will follow

## 2019-06-09 NOTE — PROGRESS NOTE ADULT - SUBJECTIVE AND OBJECTIVE BOX
Progress Note: General Surgery  Patient: MARGE BELLA , 58y (1960)Male   MRN: [id number]  Location: [address]  Visit: 06-04-19 Inpatient  Date: 06-09-19 @ 04:45    Procedure/Diagnosis: s/p right daphne CURIELA 6/4  Events over 24h: Patient complains of loss of appetite, states pain is controlled.  Denies fever, chills, chest pain, shortness of breath, nausea, vomiting. Otherwise, no acute overnight events.    Vitals: T(F): 97.1 (06-08-19 @ 21:32), Max: 97.1 (06-08-19 @ 21:32)  HR: 81 (06-08-19 @ 21:32)  BP: 109/60 (06-08-19 @ 21:32) (99/57 - 118/70)  RR: 18 (06-08-19 @ 21:32)    In:   06-07-19 @ 07:01  -  06-08-19 @ 07:00  --------------------------------------------------------  IN: 150 mL    06-08-19 @ 07:01  -  06-09-19 @ 04:45  --------------------------------------------------------  IN: 0 mL      Out:   06-07-19 @ 07:01  -  06-08-19 @ 07:00  --------------------------------------------------------  OUT:    Indwelling Catheter - Suprapubic: 1225 mL  Total OUT: 1225 mL      06-08-19 @ 07:01  -  06-09-19 @ 04:45  --------------------------------------------------------  OUT:    Indwelling Catheter - Suprapubic: 1000 mL  Total OUT: 1000 mL        Net:   06-07-19 @ 07:01  -  06-08-19 @ 07:00  --------------------------------------------------------  NET: -1075 mL    06-08-19 @ 07:01  -  06-09-19 @ 04:45  --------------------------------------------------------  NET: -1000 mL      Diet: Diet, Regular (06-07-19 @ 19:29)    IV Fluids: yes , Type: dextrose 5%. 1000 milliLiter(s) (50 mL/Hr) IV Continuous <Continuous>    Physical Examination:  General Appearance: NAD, alert and cooperative  HEENT: NCAT, WNL  Heart: S1 and S2. No murmurs. Rhythm is regular  Lungs: Clear to auscultation BL   Abdomen:  Positive bowel sounds. Soft, nondistended, nontender  Incisions/Wounds: Dressings in place, clean, dry and intact, no signs of infection/active bleeding/drainage    Medications: [Standing]  ALPRAZolam 2 milliGRAM(s) Oral at bedtime  chlorhexidine 4% Liquid 1 Application(s) Topical <User Schedule>  clindamycin IVPB 900 milliGRAM(s) IV Intermittent every 8 hours  dextrose 5%. 1000 milliLiter(s) (50 mL/Hr) IV Continuous <Continuous>  dextrose 50% Injectable 25 Gram(s) IV Push once  diazepam    Tablet 10 milliGRAM(s) Oral two times a day  docusate sodium 100 milliGRAM(s) Oral three times a day  heparin  Injectable 5000 Unit(s) SubCutaneous every 8 hours  insulin lispro (HumaLOG) corrective regimen sliding scale   SubCutaneous three times a day before meals  meropenem  IVPB 1000 milliGRAM(s) IV Intermittent every 24 hours  methadone    Tablet 10 milliGRAM(s) Oral daily  oxybutynin 5 milliGRAM(s) Oral daily  pantoprazole    Tablet 40 milliGRAM(s) Oral before breakfast    DVT Prophylaxis: heparin  Injectable 5000 Unit(s) SubCutaneous every 8 hours    GI Prophylaxis: pantoprazole    Tablet 40 milliGRAM(s) Oral before breakfast    Antibiotics: clindamycin IVPB 900 milliGRAM(s) IV Intermittent every 8 hours  meropenem  IVPB 1000 milliGRAM(s) IV Intermittent every 24 hours    Anticoagulation:   Medications:[PRN]  glucagon  Injectable 1 milliGRAM(s) IntraMuscular once PRN  morphine  - Injectable 2 milliGRAM(s) IV Push every 2 hours PRN  naloxone Injectable 0.4 milliGRAM(s) IV Push once PRN  oxyCODONE    IR 30 milliGRAM(s) Oral every 12 hours PRN  senna 2 Tablet(s) Oral daily PRN    Labs:                        8.1    11.25 )-----------( 455      ( 08 Jun 2019 00:05 )             26.1     06-08    138  |  113<H>  |  52<H>  ----------------------------<  68<L>  5.2<H>   |  15<L>  |  4.2<HH>    Ca    7.9<L>      08 Jun 2019 00:05  Phos  7.0     06-08  Mg     1.7     06-08        PT/INR - ( 08 Jun 2019 00:05 )   PT: 21.40 sec;   INR: 1.87 ratio         PTT - ( 08 Jun 2019 00:05 )  PTT:48.3 sec

## 2019-06-10 NOTE — PROGRESS NOTE ADULT - ASSESSMENT
58 M w/ PMH of Paraplegia 2/2 Lumbar compression Fx, sacral decubiti and heel ulcers s/p debridements,, PAD, suprapubic cath,Neuropathy, DM2 & HTN that resolved w/ weight loss, is BIBA c/o foul smell & drainage from his RLE, found to be anemic, in SILVINA, & gangrene of RLE. Renal is consulted for SILVINA. sp right BKA sp revision in OR today   # creatinine stable, not improving will need repeat blood work from today   # patient with significant decrease in GFR , in view of recent BKA, and low muscle mass  # non oliguric   # on Rocephin now as per ID   #continue sodium bicarb   # ph noted, corrected calcium around 9.2, on renagel will need repeat IP levels   # U protein creat ratio noted doubt patient has 7 g of proteinuria   # h/h noted, check iron stores,tx if h <7  #   no acute indication for RRT  # will follow

## 2019-06-10 NOTE — MEDICAL STUDENT PROGRESS NOTE(EDUCATION) - SUBJECTIVE AND OBJECTIVE BOX
Patient is a 57 yo male with pmh of lumbar compression fracture at the age of 24, resulting in parapalegia/ wheelchair bound with subsequent sacral decubiti and heel ulcers s/p debridements in the past and currently(managed by Dr. 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  Today is hospital day 6 and POD #6.     Events over 24h: The patient was seen and examined and reports he feels well. The patient is scheduled for the OR today for wound closure. He has been NPO since midnight. He is voiding via catheter, wound dressing is dry, clean, and no oozing, blood, or pus observed on he dressing. Pt was having constipation, and received an enema yesterday. Pt states he was able to have a BM following. Per nurse, pt has been hypotensive, but it is consistent with pt baseline in hospital, likely 2/2 to pain medications, otherwise there were no overnight events.       Vitals: T(F): 97.5 (06-09-19 @ 21:12), Max: 98 (06-09-19 @ 12:51)  HR: 83 (06-09-19 @ 21:12)  BP: 99/55 (06-09-19 @ 21:12) (99/55 - 112/63)  RR: 18 (06-09-19 @ 21:12)      In:   06-08-19 @ 07:01  -  06-09-19 @ 07:00  --------------------------------------------------------  IN: 0 mL    06-09-19 @ 07:01  -  06-10-19 @ 00:24  --------------------------------------------------------  IN: 0 mL      Out:   06-08-19 @ 07:01  -  06-09-19 @ 07:00  --------------------------------------------------------  OUT:    Indwelling Catheter - Suprapubic: 1500 mL  Total OUT: 1500 mL      06-09-19 @ 07:01  -  06-10-19 @ 00:24  --------------------------------------------------------  OUT:    Indwelling Catheter - Suprapubic: 750 mL  Total OUT: 750 mL    Net:   06-08-19 @ 07:01  -  06-09-19 @ 07:00  --------------------------------------------------------  NET: -1500 mL    06-09-19 @ 07:01  -  06-10-19 @ 00:24  --------------------------------------------------------  NET: -750 mL      Diet: Diet, Regular (06-07-19 @ 19:29)  Diet, NPO after Midnight:      NPO Start Date: 09-Jun-2019,   NPO Start Time: 23:59 (06-09-19 @ 13:57)    IV Fluids: Type: dextrose 5% + sodium chloride 0.45%. 1000 milliLiter(s) (50 mL/Hr) IV Continuous <Continuous>  dextrose 5%. 1000 milliLiter(s) (50 mL/Hr) IV Continuous <Continuous>  sodium bicarbonate 650 milliGRAM(s) Oral three times a day    Physical Examination:  General Appearance: NAD, alert and cooperative  HEENT: NCAT, WNL  Heart: S1 and S2. No murmurs, gallops, or rubs. RRR  Lungs: Clear to auscultation BL   Abdomen: Soft, nondistended, nontender  Incisions/Wounds: Dressings in place, clean, dry and intact, no signs of infection/active bleeding/drainage. Right lower extremity BKA    Medications: [Standing]  ALPRAZolam 2 milliGRAM(s) Oral at bedtime  chlorhexidine 4% Liquid 1 Application(s) Topical <User Schedule>  clindamycin IVPB 900 milliGRAM(s) IV Intermittent every 8 hours  dextrose 5% + sodium chloride 0.45%. 1000 milliLiter(s) (50 mL/Hr) IV Continuous <Continuous>  dextrose 5%. 1000 milliLiter(s) (50 mL/Hr) IV Continuous <Continuous>  dextrose 50% Injectable 25 Gram(s) IV Push once  diazepam    Tablet 10 milliGRAM(s) Oral two times a day  docusate sodium 100 milliGRAM(s) Oral three times a day  dronabinol 2.5 milliGRAM(s) Oral daily  heparin  Injectable 5000 Unit(s) SubCutaneous every 8 hours  insulin lispro (HumaLOG) corrective regimen sliding scale   SubCutaneous three times a day before meals  meropenem  IVPB 1000 milliGRAM(s) IV Intermittent every 24 hours  methadone    Tablet 10 milliGRAM(s) Oral daily  oxybutynin 5 milliGRAM(s) Oral daily  pantoprazole    Tablet 40 milliGRAM(s) Oral before breakfast  sevelamer carbonate 800 milliGRAM(s) Oral three times a day with meals  sodium bicarbonate 650 milliGRAM(s) Oral three times a day    DVT Prophylaxis: heparin  Injectable 5000 Unit(s) SubCutaneous every 8 hours  GI Prophylaxis: pantoprazole    Tablet 40 milliGRAM(s) Oral before breakfast    Antibiotics: clindamycin IVPB 900 milliGRAM(s) IV Intermittent every 8 hours  meropenem  IVPB 1000 milliGRAM(s) IV Intermittent every 24 hours    Anticoagulation:   Medications:[PRN]  glucagon  Injectable 1 milliGRAM(s) IntraMuscular once PRN  morphine  - Injectable 2 milliGRAM(s) IV Push every 2 hours PRN  naloxone Injectable 0.4 milliGRAM(s) IV Push once PRN  oxyCODONE    IR 30 milliGRAM(s) Oral every 12 hours PRN  senna 2 Tablet(s) Oral daily PRN    PT/INR - ( 09 Jun 2019 17:24 )   PT: 20.10 sec;   INR: 1.76 ratio    PTT - ( 09 Jun 2019 17:24 )  PTT:42.3 sec

## 2019-06-10 NOTE — PROGRESS NOTE ADULT - ASSESSMENT
ASSESSMENT  58 M w/ LE weakness 2/2 lumbar compression fracture at the age of 24, resulting in paraplegia wheelchair bound (can move LE but very weak, and has some sensation), with subsequent sacral decubiti and heel ulcers s/p debridements in the past, PAD, suprapubic cath, Chronic pain, Neuropathy, Insomnia, DM2 & HTN that resolved w/ weight loss, is BIBA c/o foul smell & drainage from his RLE    PROBLEMS  #Sepsis ruled out on admission, systolic hypotension, WBC>12  Necrotizing fasciitis s/p OR 6/4 Right Below knee guillotine amputation  Pt has an acute illness which poses threat to bodily function, resolved   BCX NG  OR cultures with Ecoli (R unasyn, fluoro, bactrim, I cefazolin)   2/p OR 6/10 with debridement   #SILVINA   #Hyponatremia/Hypomagnesia  #Hypotension 2/2 opioids   #R chest IV phlebitis    RECOMMENDATIONS  - Given e.coli sensitivities D/C simón/clinda and change to Ceftriaxone 2g q24h and flagyl 500mg q8h IV  - pending closure     Spectra [id number]

## 2019-06-10 NOTE — CHART NOTE - NSCHARTNOTEFT_GEN_A_CORE
Registered Dietitian Follow-Up    ***Scroll to the bottom for RD recommendation***    Patient Profile Reviewed                           Yes [x]   No []  Nutrition History Previously Obtained        Yes [x]  No []  -      PERTINENT MEDICAL INFORMATIONS:  (1) hx of umbar fx at age 24, resulting paraplegia wheelchair bound w/ pressure ulcers s/p debs.  (2) Vascular sx f/u s/p R lindseyscottie VEENAA 6/4.  (3) NPO pre-op for OR for wound closure of amputation Site.   (4) Renal rec for Cr. NO RRT.        PERTINENT SUBJECTIVE INFORMATION:  (1)       DIET ORDER:  REGULAR vs NPO FOR OR        ANTHROPOMETRICS:  - Ht.  185.42cm  - Wt.  (admitted): 72.9 kg  (6/8): 78.2kg - unlikely weight gain, likely inaccuracy of bedscale.  - BMI. 22.9  - IBW       PERTINENT LAB DATA:  6/8: RBC 3.33, h/h 8.1/26.1, INR elevated, K 5.2, BUN 52, Cr 4.2, glucose 68, Ca 7.9, GFR 15, Mag 1.7  PERTINENT MEDS:  heparin, abx, insulin, marinol, sevelamer, protonix, docusate, morphine, oxy, senna       PHYSICAL FINDINGS  - APPEARANCE:        alert and oriented. no edema.   - GI FUNCTION:          - TUBES:                       - ORAL/MOUTH:      none reported  - SKIN:                        surgical incision        NUTRITION REQUIREMENTS  WEIGHT USED:                          72.9 kg admitted  ESTIMATED ENERGY NEEDS:       CONTINUE [ x ]      ADJUST [  ] - admission note    ESTIMATED ENERGY NEEDS:         2190 kcals/day (30 kcals/kg ABW) - multiple PU, paraplegic  ESTIMATED PROTEIN NEEDS:        73-87 /day (1-1.2 g/kg ABW)- wounds and sepsis noted, however renal fx poor at this time  ESTIMATED FLUID NEEDS:             1ml/kcal or per renal    CURRENT NUTRIENT NEEDS:              [  ] PREVIOUS NUTRITION DIAGNOSIS:   (1) Inadequate protein-energy intake            [  ] ONGOING        [  ] RESOLVED    NUTRITION DIAGNOSTIC #1  PROBLEM:                     ETIOLOGY:                     SIGN/SYMPTOMS:            PATIENT INTERVENTION:    [  ] ORAL        [ ] EN/TF     GOAL/EXPECTED OUTCOME:       INDICATOR/MONITORING:       RD to monitor diet order, energy intake, body composition, nutrition focused physical findings  NUTRITION INTERVENTION:        Meals and snacks. Medical food supplements    RECS: (1) Registered Dietitian Follow-Up    ***Scroll to the bottom for RD recommendation***    Patient Profile Reviewed                           Yes [x]   No []  Nutrition History Previously Obtained        Yes [x]  No []  - s/p OR wound closure 6/10 AM. First meal is lunch and <15% consumed because he has been constipated, despite having BM 6/9, pt still doesn't feel that well (GI wise) to want to eat more food. Pt admits having good appetite, but will wait until constipation is totally resolved. Spoken with RN and vascular surgery to add Low Na, K, P, and Carbohydrate consistent w/ snack diet modifier.       PERTINENT MEDICAL INFORMATIONS:  (1) hx of umbar fx at age 24, resulting paraplegia wheelchair bound w/ pressure ulcers s/p debs.  (2) Vascular sx f/u s/p R daphne CURIELA 6/4.  (3) NPO pre-op for OR for wound closure of amputation Site.   (4) Renal rec for Cr. NO RRT.        PERTINENT SUBJECTIVE INFORMATION:  (1) see above      DIET ORDER:  REGULAR vs NPO for OR        ANTHROPOMETRICS:  - Ht.  185.42cm  - Wt.  (admitted): 72.9 kg  (6/8): 78.2kg - unlikely weight gain, likely inaccuracy of bedscale.  - BMI. 22.9  - IBW       PERTINENT LAB DATA:  6/8: RBC 3.33, h/h 8.1/26.1, INR elevated, K 5.2, BUN 52, Cr 4.2, glucose 68, Ca 7.9, GFR 15, Mag 1.7  PERTINENT MEDS:  heparin, abx, insulin, marinol, sevelamer, protonix, docusate, morphine, oxy, senna       PHYSICAL FINDINGS  - APPEARANCE:        alert and oriented. no edema.   - GI FUNCTION:        LBM 6/9 - pt c/o constipation though  - TUBES:                       - ORAL/MOUTH:      none reported  - SKIN:                        DFU, heel/wound, b/l stage 4 to ischial, stage 4 to sacrum, and coccyx.        NUTRITION REQUIREMENTS  WEIGHT USED:                          72.9 kg admitted  ESTIMATED ENERGY NEEDS:       CONTINUE [ x ]      ADJUST [  ] - admission note (multiple-wounds and PU)    ESTIMATED ENERGY NEEDS:         2190 kcals/day (30 kcals/kg ABW) - multiple PU, paraplegic  ESTIMATED PROTEIN NEEDS:        73-87 /day (1-1.2 g/kg ABW)- wounds and sepsis noted, however renal fx poor at this time  ESTIMATED FLUID NEEDS:             1ml/kcal or per renal    CURRENT NUTRIENT NEEDS:    slowly improving          [  x] PREVIOUS NUTRITION DIAGNOSIS:   (1) Inadequate protein-energy intake            [ x ] ONGOING        [  ] RESOLVED           PATIENT INTERVENTION:    [ x ] ORAL        [ ] EN/TF     GOAL/EXPECTED OUTCOME:     pt to consume and tolerate >75% of all meals and snacks upon f/u in 3 days. Pt to have 1BM/day.  INDICATOR/MONITORING:       RD to monitor diet order, energy intake, body composition, nutrition focused physical findings (BM, renal electrolytes, renal profile, glucose profile)  NUTRITION INTERVENTION:        Meals and snacks.     RECS: (1) Spoken with Vascular regarding adding LOW SODIUM, LOW K and Phos, Carbohydrate consistent w/ snack to current diet. (2) Consider a more aggressive bowel regimen if patient continues to c/o constipation. Registered Dietitian Follow-Up    ***Scroll to the bottom for RD recommendation***    Patient Profile Reviewed                           Yes [x]   No []  Nutrition History Previously Obtained        Yes [x]  No []  - s/p OR wound closure 6/10 AM. First meal is lunch and <15% consumed because he has been constipated, despite having BM 6/9, pt still doesn't feel that well (GI wise) to want to eat more food. Pt admits having good appetite, but will wait until constipation is totally resolved. Spoken with RN and vascular surgery to add Low Na, K, P, and Carbohydrate consistent w/ snack diet modifier. Encoruaged pt to order Yogurt for probiotics.      PERTINENT MEDICAL INFORMATIONS:  (1) hx of umbar fx at age 24, resulting paraplegia wheelchair bound w/ pressure ulcers s/p debs.  (2) Vascular sx f/u s/p R daphne BKA 6/4.  (3) NPO pre-op for OR for wound closure of amputation Site.   (4) Renal rec for Cr. NO RRT.        PERTINENT SUBJECTIVE INFORMATION:  (1) see above      DIET ORDER:  REGULAR vs NPO for OR        ANTHROPOMETRICS:  - Ht.  185.42cm  - Wt.  (admitted): 72.9 kg  (6/8): 78.2kg - unlikely weight gain, likely inaccuracy of bedscale.  - BMI. 22.9  - IBW       PERTINENT LAB DATA:  6/8: RBC 3.33, h/h 8.1/26.1, INR elevated, K 5.2, BUN 52, Cr 4.2, glucose 68, Ca 7.9, GFR 15, Mag 1.7  PERTINENT MEDS:  heparin, abx, insulin, marinol, sevelamer, protonix, docusate, morphine, oxy, senna       PHYSICAL FINDINGS  - APPEARANCE:        alert and oriented. no edema.   - GI FUNCTION:        LBM 6/9 - pt c/o constipation though  - TUBES:                       - ORAL/MOUTH:      none reported  - SKIN:                        DFU, heel/wound, b/l stage 4 to ischial, stage 4 to sacrum, and coccyx.        NUTRITION REQUIREMENTS  WEIGHT USED:                          72.9 kg admitted  ESTIMATED ENERGY NEEDS:       CONTINUE [ x ]      ADJUST [  ] - admission note (multiple-wounds and PU)    ESTIMATED ENERGY NEEDS:         2190 kcals/day (30 kcals/kg ABW) - multiple PU, paraplegic  ESTIMATED PROTEIN NEEDS:        73-87 /day (1-1.2 g/kg ABW)- wounds and sepsis noted, however renal fx poor at this time  ESTIMATED FLUID NEEDS:             1ml/kcal or per renal    CURRENT NUTRIENT NEEDS:    slowly improving          [  x] PREVIOUS NUTRITION DIAGNOSIS:   (1) Inadequate protein-energy intake            [ x ] ONGOING        [  ] RESOLVED           PATIENT INTERVENTION:    [ x ] ORAL        [ ] EN/TF     GOAL/EXPECTED OUTCOME:     pt to consume and tolerate >75% of all meals and snacks upon f/u in 3 days. Pt to have 1BM/day.  INDICATOR/MONITORING:       RD to monitor diet order, energy intake, body composition, nutrition focused physical findings (BM, renal electrolytes, renal profile, glucose profile)  NUTRITION INTERVENTION:        Meals and snacks.     RECS: (1) Spoken with Vascular regarding adding LOW SODIUM, LOW K and Phos, Carbohydrate consistent w/ snack to current diet. (2) Consider a more aggressive bowel regimen if patient continues to c/o constipation.

## 2019-06-10 NOTE — CHART NOTE - NSCHARTNOTEFT_GEN_A_CORE
GENERAL SURGERY PROGRESS NOTE     MARGE BELLA  58y  Male  Hospital day :6d  POD:  Procedure: Irrigation and excisional debridement of tissue down to bone  Guillotine amputation below knee    Patient laying comfortably in bed. Knee immobilizer in place, sterile dressing c/d/i. Denies pain, fever/chills, n/v.     T(F): 97.8 (06-10-19 @ 12:00), Max: 97.8 (06-10-19 @ 12:00)  HR: 78 (06-10-19 @ 12:00) (76 - 83)  BP: 101/60 (06-10-19 @ 12:00) (92/53 - 111/56)    RR: 20 (06-10-19 @ 12:00) (15 - 20)  SpO2: 97% (06-10-19 @ 11:20) (96% - 99%)    DIET/FLUIDS: dextrose 5% + sodium chloride 0.45%. 1000 milliLiter(s) IV Continuous <Continuous>  sodium bicarbonate 650 milliGRAM(s) Oral three times a day      GI proph:  pantoprazole    Tablet 40 milliGRAM(s) Oral before breakfast    AC/ proph: heparin  Injectable 5000 Unit(s) SubCutaneous every 8 hours    ABx: cefTRIAXone   IVPB 2 Gram(s) IV Intermittent every 24 hours  metroNIDAZOLE  IVPB 500 milliGRAM(s) IV Intermittent every 8 hours      PHYSICAL EXAM:  GENERAL: NAD, well-appearing  CHEST/LUNG: no obvious increased wob   EXTREMITIES:  RLE Knee immobilizer in place, sterile dressing c/d/i.      LABS      POCT Blood Glucose.: 76 mg/dL (10 Lionel 2019 11:36)  POCT Blood Glucose.: 91 mg/dL (10 Lionel 2019 07:12)  POCT Blood Glucose.: 96 mg/dL (09 Jun 2019 21:02)  POCT Blood Glucose.: 83 mg/dL (09 Jun 2019 15:48)                  LFTs:       ABG - ( 04 Jun 2019 12:33 )  pH: 7.34  /  pCO2: 34    /  pO2: 35    / HCO3: 19    / Base Excess: -6.4  /  SaO2: 67                Coags:     20.10  ----< 1.76    ( 09 Jun 2019 17:24 )     42.3                        A/P    s/p RLE BKA WASHOUT POD 0    PLAN:    - trend hgb, will transfuse wed before OR thursday    - pain control    - hold AC for now

## 2019-06-10 NOTE — PROGRESS NOTE ADULT - SUBJECTIVE AND OBJECTIVE BOX
MARGE BELLA  58y, Male  Allergy: sulfamethizole (Unknown)      CHIEF COMPLAINT: Hemodynamic monitoring. sepsis (06 Jun 2019 03:58)      INTERVAL EVENTS/HPI  - No acute events overnight, s/p OR today   - T(F): , Max: 97.8 (06-10-19 @ 12:00)  - Denies any worsening symptoms  - Tolerating medication      ROS  Negative except as per noted above in HPI  Denies cough  Denies diarrhea  Denies dysuria   Denies pain at any IV site     FH noncontributory    VITALS:  T(F): 97.8, Max: 97.8 (06-10-19 @ 12:00)  HR: 78  BP: 101/60  RR: 20Vital Signs Last 24 Hrs  T(C): 36.6 (10 Lionel 2019 12:00), Max: 36.6 (10 Lionel 2019 12:00)  T(F): 97.8 (10 Lionel 2019 12:00), Max: 97.8 (10 Lionel 2019 12:00)  HR: 78 (10 Lionel 2019 12:00) (76 - 83)  BP: 101/60 (10 Lionel 2019 12:00) (92/53 - 111/56)  BP(mean): --  RR: 20 (10 Lionel 2019 12:00) (15 - 20)  SpO2: 97% (10 Lionel 2019 11:20) (96% - 99%)    PHYSICAL EXAM:  Gen: NAD, resting in bed  HEENT: Normocephalic, atraumatic  Neck: supple, no lymphadenopathy  CV: Regular rate & regular rhythm  Lungs: decreased BS at bases, no fremitus  Abdomen: Soft, BS present spc  Ext: Warm, well perfused, R amputation dressings   Neuro: non focal, awake  Skin: no rash, no erythema  Lines: ant chest IV streaky erythema      TESTS & MEASUREMENTS:                  Culture - Blood (collected 06-05-19 @ 04:37)  Source: .Blood None  Preliminary Report (06-06-19 @ 14:01):    No growth to date.    Culture - Other (collected 06-04-19 @ 00:24)  Source: .Other right medial ankle culterette  Final Report (06-06-19 @ 17:38):    Numerous Escherichia coli  Organism: Escherichia coli (06-06-19 @ 17:38)  Organism: Escherichia coli (06-06-19 @ 17:38)      -  Amikacin: S <=16      -  Ampicillin: R >16 These ampicillin results predict results for amoxicillin      -  Ampicillin/Sulbactam: R >16/8      -  Aztreonam: S <=4      -  Cefazolin: I 16      -  Cefepime: S <=4      -  Cefoxitin: S <=8      -  Ceftriaxone: S <=1 Enterobacter, Citrobacter, and Serratia may develop resistance during prolonged therapy      -  Ciprofloxacin: R >2      -  Ertapenem: S <=1      -  Gentamicin: R >8      -  Imipenem: S <=1      -  Levofloxacin: R >4      -  Meropenem: S <=1      -  Piperacillin/Tazobactam: S <=16      -  Tobramycin: I 8      -  Trimethoprim/Sulfamethoxazole: R >2/38      Method Type: ALLISON    Culture - Blood (collected 06-03-19 @ 21:50)  Source: .Blood Blood  Final Report (06-09-19 @ 07:00):    No growth at 5 days.    Culture - Other (collected 06-03-19 @ 21:25)  Source: .Other None  Final Report (06-07-19 @ 12:21):    Numerous Escherichia coli    Few Escherichia coli #2    Few Corynebacterium species "Susceptibilities not performed"  Organism: Escherichia coli  Escherichia coli (06-07-19 @ 12:21)  Organism: Escherichia coli (06-07-19 @ 12:21)      -  Amikacin: S <=16      -  Ampicillin: R >16 These ampicillin results predict results for amoxicillin      -  Ampicillin/Sulbactam: R >16/8      -  Aztreonam: S <=4      -  Cefepime: S <=4      -  Cefoxitin: S <=8      -  Ceftriaxone: S <=1 Enterobacter, Citrobacter, and Serratia may develop resistance during prolonged therapy      -  Ciprofloxacin: R >2      -  Ertapenem: S <=1      -  Gentamicin: R >8      -  Imipenem: S <=1      -  Levofloxacin: R >4      -  Meropenem: S <=1      -  Piperacillin/Tazobactam: S <=16      -  Tobramycin: I 8      -  Trimethoprim/Sulfamethoxazole: R >2/38      Method Type: ALLISON  Organism: Escherichia coli (06-07-19 @ 12:21)      -  Amikacin: S <=16      -  Ampicillin: R >16 These ampicillin results predict results for amoxicillin      -  Ampicillin/Sulbactam: R >16/8      -  Aztreonam: S <=4      -  Cefazolin: I 16      -  Cefepime: S <=4      -  Cefoxitin: S <=8      -  Ceftriaxone: S <=1 Enterobacter, Citrobacter, and Serratia may develop resistance during prolonged therapy      -  Ciprofloxacin: R >2      -  Ertapenem: S <=1      -  Gentamicin: R >8      -  Imipenem: S <=1      -  Levofloxacin: R >4      -  Meropenem: S <=1      -  Piperacillin/Tazobactam: S <=16      -  Tobramycin: I 8      -  Trimethoprim/Sulfamethoxazole: R >2/38      Method Type: ALLISON    Culture - Blood (collected 06-03-19 @ 21:25)  Source: .Blood Blood  Final Report (06-09-19 @ 07:00):    No growth at 5 days.            INFECTIOUS DISEASES TESTING      RADIOLOGY & ADDITIONAL TESTS:  I have personally reviewed the last Chest xray  CXR      CT      CARDIOLOGY TESTING  Transthoracic Echocardiogram:    EXAM:  2-D ECHO (TTE) COMPLETE        PROCEDURE DATE:  06/04/2019      INTERPRETATION:  REPORT:    TRANSTHORACIC ECHOCARDIOGRAM REPORT         Patient Name:   MARGE BELLA Accession #: [id number]  Medical Rec #:  [id number]   Height:      72.0 in 182.9 cm  YOB: 1960  Weight:      150.0 lb 68.04 kg  Patient Age:    58 years   BSA:         1.89 m²  Patient Gender: M          BP:          95/55 mmHg       Date of Exam:        6/4/2019 5:48:30 PM  Referring Physician: [id number] ED UNASSIGNED  Sonographer:         Kira Hawkins  Reading Physician:   Sebastian Díaz MD.    Procedure:     2D Echo/Doppler/Color Doppler Complete.  Indications:   I42.9 - Cardiomyopathy, unspecified  Diagnosis:     I42.9 - Cardiomyopathy, unspecified  Study Details: Technically good study.         Summary:   1. Left ventricular ejection fraction, by visual estimation, is 65 to   70%.   2. Technically good study.   3. Normal left ventricular size and wall thicknesses, with normal   systolic and diastolic function.   4. The mean global longitudinal strain by speckle tracking is -20.2%   which is normal.   5. Moderate mitral annular calcification.   6. LA volume Index is 31.5 ml/m² ml/m2.    PHYSICIAN INTERPRETATION:  Left Ventricle: Normal left ventricular size and wall thicknesses, with   normal systolic and diastolic function. Left ventricular ejection   fraction, by visual estimation, is 65 to 70%. The mean global   longitudinal strain by speckle tracking is -20.2% which is normal.  Right Ventricle: Normal right ventricular size and function.  Left Atrium: Normal left atrial size. LA volume Index is 31.5 ml/m² ml/m2.  Right Atrium: Normal right atrial size.  Pericardium: There is no evidence of pericardial effusion.  Mitral Valve: Structurally normal mitral valve, with normal leaflet   excursion. The mitral valve is normal in structure. There is moderate   mitral annular calcification. Mild mitral valve regurgitation is seen.  Tricuspid Valve: Structurally normal tricuspid valve, with normal leaflet   excursion. The tricuspid valve is normal in structure. Mild tricuspid   regurgitation is visualized.  Aortic Valve: Normal trileaflet aortic valve with normal opening. The   aortic valve is normal. No evidence of aortic valve regurgitation is seen.  Pulmonic Valve: Structurally normal pulmonic valve, with normal leaflet   excursion. The pulmonic valve is normal. No indication of pulmonic valve   regurgitation.  Aorta: The aortic root and ascending aorta are structurally normal, with   no evidence of dilitation.  Pulmonary Artery: The main pulmonary artery is normal in size.  Venous: The inferior vena cava was normal sized, with respiratory size   variation greater than 50%.       2D AND M-MODE MEASUREMENTS (normal ranges within parentheses):  Left Ventricle:                  Normal   Aorta/Left Atrium:               Normal  IVSd (2D):              0.99 cm (0.7-1.1) AoV Cusp Separation: 2.14 cm   (1.5-2.6)  LVPWd (2D):             1.01 cm (0.7-1.1) Left Atrium (Mmode): 4.22 cm   (1.9-4.0)  LVIDd (2D):             4.31 cm (3.4-5.7) Right Ventricle:  LVIDs (2D):             3.12 cm           RVd (2D):        2.78 cm  LV FS (2D):             27.8 %   (>25%)  Relative Wall Thickness  0.47    (<0.42)    SPECTRAL DOPPLER ANALYSIS:  LV DIASTOLIC FUNCTION:  MV Peak E: 1.06 m/s Decel Time: 191 msec  MV Peak A: 1.40 m/s  E/A Ratio: 0.76    Aortic Valve:  AoV VMax:    1.74 m/s  AoV Area, Vmax: 2.55 cm² Vmax Indx: 1.35 cm²/m²  AoV Pk Grad: 12.1 mmHg    LVOT Vmax: 1.41 m/s  LVOT VTI:  0.37 m  LVOT Diam: 2.00 cm    Mitral Valve:  MV P1/2 Time: 55.33 msec  MV Area, PHT: 3.98 cm²    Tricuspid Valve and PA/RV Systolic Pressure: TR Max Velocity: 2.63 m/s RA   Pressure: 3 mmHg RVSP/PASP: 30.7 mmHg       [id number] Sebastian Díaz MD, Electronically signed on 6/4/2019 at 10:00:57   PM              *** Final ***                    SEBASTIAN DÍAZ MD  This document has been electronically signed. Jun 4 2019  5:48PM             (06-04-19 @ 17:48)  12 Lead ECG:   Ventricular Rate 80 BPM    Atrial Rate 80 BPM    P-R Interval 184 ms    QRS Duration 98 ms    Q-T Interval 400 ms    QTC Calculation(Bezet) 461 ms    P Axis 48 degrees    R Axis -22 degrees    T Axis 31 degrees    Diagnosis Line Normal sinus rhythm  Normal ECG    Confirmed by SEBASTIAN DÍAZ MD ([id number]) on 6/4/2019 3:26:26 PM (06-04-19 @ 13:45)      MEDICATIONS  ALPRAZolam 2  chlorhexidine 4% Liquid 1  clindamycin IVPB 900  dextrose 5% + sodium chloride 0.45%. 1000  diazepam    Tablet 10  docusate sodium 100  dronabinol 2.5  heparin  Injectable 5000  insulin lispro (HumaLOG) corrective regimen sliding scale   meropenem  IVPB 500  methadone    Tablet 10  oxybutynin 5  pantoprazole    Tablet 40  sevelamer carbonate 800  sodium bicarbonate 650      ANTIBIOTICS:  clindamycin IVPB 900 milliGRAM(s) IV Intermittent every 8 hours  meropenem  IVPB 500 milliGRAM(s) IV Intermittent every 12 hours

## 2019-06-10 NOTE — PROGRESS NOTE ADULT - ASSESSMENT
Assessment:  58y Male patient admitted s/p right daphne RAFI 6/4    Plan:  -pain control  -GI/DVT ppx  -encourage ambulation  -incentive spirometer  -NPO after midnight today  -preop for OR today for closure of amputation site  -nephrology recommendations

## 2019-06-10 NOTE — PROGRESS NOTE ADULT - SUBJECTIVE AND OBJECTIVE BOX
Nephrology progress note    Patient is seen and examined, events over the last 24 h noted .    Allergies:  sulfamethizole (Unknown)    Hospital Medications:   MEDICATIONS  (STANDING):        VITALS:  T(F): 96.1 (06-10-19 @ 05:17), Max: 98 (19 @ 12:51)  HR: 82 (06-10-19 @ 08:43)  BP: 92/53 (06-10-19 @ 08:43)  RR: 18 (06-10-19 @ 08:43)  SpO2: --  Wt(kg): --     @ 07:01  -   @ 07:00  --------------------------------------------------------  IN: 0 mL / OUT: 1500 mL / NET: -1500 mL     @ 07:01  -  06-10 @ 07:00  --------------------------------------------------------  IN: 0 mL / OUT: 750 mL / NET: -750 mL    06-10 @ 07:01  -  06-10 @ 09:27  --------------------------------------------------------  IN: 0 mL / OUT: 450 mL / NET: -450 mL      Height (cm): 185.42 (06-10 @ 08:57)  Weight (kg): 70.8 (06-10 @ 08:57)  BMI (kg/m2): 20.6 (06-10 @ 08:57)  BSA (m2): 1.94 (06-10 @ 08:57)    PHYSICAL EXAM:  Constitutional: NAD  HEENT: anicteric sclera, oropharynx clear, MMM  Neck: No JVD  Respiratory: CTAB, no wheezes, rales or rhonchi  Cardiovascular: S1, S2, RRR  Gastrointestinal: BS+, soft, NT/ND  Extremities: No cyanosis or clubbing. No peripheral edema  :  No brown.   Skin: No rashes    LABS:            Urine Studies:  Urinalysis Basic - ( 2019 00:13 )    Color: Yellow / Appearance: Clear / S.010 / pH:   Gluc:  / Ketone: Negative  / Bili: Negative / Urobili: 0.2 mg/dL   Blood:  / Protein: 100 mg/dL / Nitrite: Negative   Leuk Esterase: Small / RBC: 1-2 /HPF / WBC 6-10 /HPF   Sq Epi:  / Non Sq Epi: Occasional /HPF / Bacteria: Few /HPF      Protein/Creatinine Ratio Calculation: 7 Ratio ( @ 00:13)  Creatinine, Random Urine: 39 mg/dL ( @ 00:13)  Chloride, Random Urine: 26 ( @ 00:13)  Sodium, Random Urine: 38.0 mmoL/L ( @ 00:13)  Osmolality, Random Urine: 275 mos/kg ( @ 00:13)  Creatinine, Random Urine: 16 mg/dL ( @ 18:10)  Potassium, Random Urine: 13 mmol/L ( @ 18:10)  Sodium, Random Urine: 33.0 mmoL/L ( @ 18:10)    RADIOLOGY & ADDITIONAL STUDIES: Nephrology progress note    Patient is seen and examined, events over the last 24 h noted .  sp OR for wound management and closure     Allergies:  sulfamethizole (Unknown)    Hospital Medications:   MEDICATIONS  (STANDING):  MEDICATIONS  (STANDING):  ALPRAZolam 2 milliGRAM(s) Oral at bedtime  cefTRIAXone   IVPB 2 Gram(s) IV Intermittent every 24 hours  chlorhexidine 4% Liquid 1 Application(s) Topical <User Schedule>  dextrose 5% + sodium chloride 0.45%. 1000 milliLiter(s) (50 mL/Hr) IV Continuous <Continuous>  diazepam    Tablet 10 milliGRAM(s) Oral two times a day  docusate sodium 100 milliGRAM(s) Oral three times a day  dronabinol 2.5 milliGRAM(s) Oral daily  heparin  Injectable 5000 Unit(s) SubCutaneous every 8 hours  insulin lispro (HumaLOG) corrective regimen sliding scale   SubCutaneous three times a day before meals  methadone    Tablet 10 milliGRAM(s) Oral daily  metroNIDAZOLE  IVPB 500 milliGRAM(s) IV Intermittent every 8 hours  oxybutynin 5 milliGRAM(s) Oral daily  pantoprazole    Tablet 40 milliGRAM(s) Oral before breakfast  sevelamer carbonate 800 milliGRAM(s) Oral three times a day with meals  sodium bicarbonate 650 milliGRAM(s) Oral three times a day          VITALS:  T(F): 96.1 (06-10-19 @ 05:17), Max: 98 (19 @ 12:51)  HR: 82 (06-10-19 @ 08:43)  BP: 92/53 (06-10-19 @ 08:43)  RR: 18 (06-10-19 @ 08:43)       @ 07:  -   @ 07:00  --------------------------------------------------------  IN: 0 mL / OUT: 1500 mL / NET: -1500 mL     @ 07:01  -  06-10 @ 07:00  --------------------------------------------------------  IN: 0 mL / OUT: 750 mL / NET: -750 mL    06-10 @ 07:01  -  06-10 @ 09:27  --------------------------------------------------------  IN: 0 mL / OUT: 450 mL / NET: -450 mL      Height (cm): 185.42 (06-10 @ 08:57)  Weight (kg): 70.8 (06-10 @ 08:57)  BMI (kg/m2): 20.6 (06-10 @ 08:57)  BSA (m2): 1.94 (06-10 @ 08:57)    PHYSICAL EXAM:  Constitutional: NAD  HEENT: anicteric sclera, oropharynx clear, MMM  Neck: No JVD  Respiratory: CTAB, no wheezes, rales or rhonchi  Cardiovascular: S1, S2, RRR  Gastrointestinal: BS+, soft, NT/ND  Extremities: No cyanosis or clubbing. right BKA   :  No brown.   Skin: No rashes        Urine Studies:  Urinalysis Basic - ( 2019 00:13 )    Color: Yellow / Appearance: Clear / S.010 / pH:   Gluc:  / Ketone: Negative  / Bili: Negative / Urobili: 0.2 mg/dL   Blood:  / Protein: 100 mg/dL / Nitrite: Negative   Leuk Esterase: Small / RBC: 1-2 /HPF / WBC 6-10 /HPF   Sq Epi:  / Non Sq Epi: Occasional /HPF / Bacteria: Few /HPF      Protein/Creatinine Ratio Calculation: 7 Ratio ( @ 00:13)  Creatinine, Random Urine: 39 mg/dL ( 00:13)  Chloride, Random Urine: 26 ( 00:13)  Sodium, Random Urine: 38.0 mmoL/L ( 00:13)  Osmolality, Random Urine: 275 mos/kg ( 00:13)  Creatinine, Random Urine: 16 mg/dL ( 18:10)  Potassium, Random Urine: 13 mmol/L ( 18:10)  Sodium, Random Urine: 33.0 mmoL/L ( @ 18:10)    RADIOLOGY & ADDITIONAL STUDIES:

## 2019-06-10 NOTE — PROGRESS NOTE ADULT - SUBJECTIVE AND OBJECTIVE BOX
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 Complaining of constipation. Fleet enema administered, had BM with relief of symptoms.     Vitals: T(F): 97.5 (06-09-19 @ 21:12), Max: 98 (06-09-19 @ 12:51)  HR: 83 (06-09-19 @ 21:12)  BP: 99/55 (06-09-19 @ 21:12) (99/55 - 112/63)  RR: 18 (06-09-19 @ 21:12)      In:   06-08-19 @ 07:01  -  06-09-19 @ 07:00  --------------------------------------------------------  IN: 0 mL    06-09-19 @ 07:01  -  06-10-19 @ 00:24  --------------------------------------------------------  IN: 0 mL      Out:   06-08-19 @ 07:01  -  06-09-19 @ 07:00  --------------------------------------------------------  OUT:    Indwelling Catheter - Suprapubic: 1500 mL  Total OUT: 1500 mL      06-09-19 @ 07:01  -  06-10-19 @ 00:24  --------------------------------------------------------  OUT:    Indwelling Catheter - Suprapubic: 750 mL  Total OUT: 750 mL    Net:   06-08-19 @ 07:01  -  06-09-19 @ 07:00  --------------------------------------------------------  NET: -1500 mL    06-09-19 @ 07:01  -  06-10-19 @ 00:24  --------------------------------------------------------  NET: -750 mL      Diet: Diet, Regular (06-07-19 @ 19:29)  Diet, NPO after Midnight:      NPO Start Date: 09-Jun-2019,   NPO Start Time: 23:59 (06-09-19 @ 13:57)    IV Fluids: Type: dextrose 5% + sodium chloride 0.45%. 1000 milliLiter(s) (50 mL/Hr) IV Continuous <Continuous>  dextrose 5%. 1000 milliLiter(s) (50 mL/Hr) IV Continuous <Continuous>  sodium bicarbonate 650 milliGRAM(s) Oral three times a day    Physical Examination:  General Appearance: NAD, alert and cooperative  HEENT: NCAT, WNL  Heart: S1 and S2. No murmurs. Rhythm is regular  Lungs: Clear to auscultation BL   Abdomen:  Positive bowel sounds. Soft, nondistended, nontender  Incisions/Wounds: Dressings in place, clean, dry and intact, no signs of infection/active bleeding/drainage. Sacral wound and right lower extremity BKA    Medications: [Standing]  ALPRAZolam 2 milliGRAM(s) Oral at bedtime  chlorhexidine 4% Liquid 1 Application(s) Topical <User Schedule>  clindamycin IVPB 900 milliGRAM(s) IV Intermittent every 8 hours  dextrose 5% + sodium chloride 0.45%. 1000 milliLiter(s) (50 mL/Hr) IV Continuous <Continuous>  dextrose 5%. 1000 milliLiter(s) (50 mL/Hr) IV Continuous <Continuous>  dextrose 50% Injectable 25 Gram(s) IV Push once  diazepam    Tablet 10 milliGRAM(s) Oral two times a day  docusate sodium 100 milliGRAM(s) Oral three times a day  dronabinol 2.5 milliGRAM(s) Oral daily  heparin  Injectable 5000 Unit(s) SubCutaneous every 8 hours  insulin lispro (HumaLOG) corrective regimen sliding scale   SubCutaneous three times a day before meals  meropenem  IVPB 1000 milliGRAM(s) IV Intermittent every 24 hours  methadone    Tablet 10 milliGRAM(s) Oral daily  oxybutynin 5 milliGRAM(s) Oral daily  pantoprazole    Tablet 40 milliGRAM(s) Oral before breakfast  sevelamer carbonate 800 milliGRAM(s) Oral three times a day with meals  sodium bicarbonate 650 milliGRAM(s) Oral three times a day    DVT Prophylaxis: heparin  Injectable 5000 Unit(s) SubCutaneous every 8 hours  GI Prophylaxis: pantoprazole    Tablet 40 milliGRAM(s) Oral before breakfast    Antibiotics: clindamycin IVPB 900 milliGRAM(s) IV Intermittent every 8 hours  meropenem  IVPB 1000 milliGRAM(s) IV Intermittent every 24 hours    Anticoagulation:   Medications:[PRN]  glucagon  Injectable 1 milliGRAM(s) IntraMuscular once PRN  morphine  - Injectable 2 milliGRAM(s) IV Push every 2 hours PRN  naloxone Injectable 0.4 milliGRAM(s) IV Push once PRN  oxyCODONE    IR 30 milliGRAM(s) Oral every 12 hours PRN  senna 2 Tablet(s) Oral daily PRN    PT/INR - ( 09 Jun 2019 17:24 )   PT: 20.10 sec;   INR: 1.76 ratio    PTT - ( 09 Jun 2019 17:24 )  PTT:42.3 sec

## 2019-06-10 NOTE — MEDICAL STUDENT PROGRESS NOTE(EDUCATION) - NS MD HP STUD ASPLAN PLAN FT
-pain control  -GI/DVT ppx  -encourage ambulation  -incentive spirometer  -Pt NPO since midnight for OR this afternoon for amputation closure  -nephrology recommendations  -continue to monitor BP

## 2019-06-10 NOTE — MEDICAL STUDENT PROGRESS NOTE(EDUCATION) - NS MD HP STUD ASPLAN ASSES FT
Patient is a 57 yo male with pmh of lumbar compression fracture at the age of 24, resulting in parapalegia/ wheelchair bound with subsequent sacral decubiti and heel ulcers s/p debridements in the past and currently( managed by Dr. 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

## 2019-06-10 NOTE — PROVIDER CONTACT NOTE (OTHER) - ASSESSMENT
Patient with left midline in place; IVF infusing but resistance is met when trying to flush IVL or administer PRN medications. Compared to right UE, left UE appears to be more swollen and cool to touch. Dr. Valadez notified and asked to come assess patients arm
Pt alert, reporting pain.

## 2019-06-11 NOTE — PROGRESS NOTE ADULT - ASSESSMENT
Assessment:  58y Male patient admitted S/P sharp excisional debridement and washout of right BKA guillotine amputation stump.    Plan:  Diet regular  Pain control  Antibiotics  OR Thursday for washout of right BKA  Encourage incentive spirometer use

## 2019-06-11 NOTE — PROGRESS NOTE ADULT - ASSESSMENT
58 M w/ PMH of Paraplegia 2/2 Lumbar compression Fx, sacral decubiti and heel ulcers s/p debridements,, PAD, suprapubic cath,Neuropathy, DM2 & HTN that resolved w/ weight loss, is BIBA c/o foul smell & drainage from his RLE, found to be anemic, in SILVINA, & gangrene of RLE. Renal is consulted for SILVINA. sp right BKA sp revision in OR today   # creatinine slightly better today   # patient with significant decrease in GFR , in view of recent BKA, and low muscle mass  # non oliguric   # on Rocephin / flagyl  now as per ID until wound closure   #continue sodium bicarb   # K noted . Consider low k DIET   # ph noted, corrected calcium around 9.2, on renagel CONT  # h/h noted, check iron stores,tx if h <7  #   no acute indication for RRT  # REPEAT bmp In am   # will follow

## 2019-06-11 NOTE — MEDICAL STUDENT PROGRESS NOTE(EDUCATION) - SUBJECTIVE AND OBJECTIVE BOX
Patient is a 59 yo male with pmh of lumbar compression fracture at the age of 24, resulting in parapalegia/ wheelchair bound with subsequent sacral decubiti and heel ulcers s/p debridements in the past and currently(managed by Dr. 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  Today is hospital day 7 and POD #7. Yesterday patient went for Irrigation and excisional debridement of tissue down to bone.     Events over 24h: The patient was seen and examined and reports he feels well. He reports he has been eating, drinking, and voiding via catheter, wound dressing is dry, clean, and no oozing, blood, or pus observed. Pt states he has not had a BM since surgery and refused enema yesterday. Nurse also noted that there was noted swelling to left midline and resistance to flush and administration of meds. Midline was placed to right UE overnight, which is fine, and swelling resolved to left upper extremity. There were no other overnight events.     Vitals: T(F): 96.8 (06-11-19 @ 04:29), Max: 97.8 (06-10-19 @ 12:00)  HR: 80 (06-11-19 @ 04:29)  BP: 121/56 (06-11-19 @ 04:29) (92/53 - 121/56)  RR: 18 (06-11-19 @ 04:29)  SpO2: 97% (06-10-19 @ 11:20)    In:   06-09-19 @ 07:01  -  06-10-19 @ 07:00  --------------------------------------------------------  IN: 0 mL    06-10-19 @ 07:01  -  06-11-19 @ 06:08  --------------------------------------------------------  IN: 100 mL      Out:   06-09-19 @ 07:01  -  06-10-19 @ 07:00  --------------------------------------------------------  OUT:    Indwelling Catheter - Suprapubic: 750 mL  Total OUT: 750 mL      06-10-19 @ 07:01  -  06-11-19 @ 06:08  --------------------------------------------------------  OUT:    Indwelling Catheter - Suprapubic: 1950 mL  Total OUT: 1950 mL        Net:   06-09-19 @ 07:01  -  06-10-19 @ 07:00  --------------------------------------------------------  NET: -750 mL    06-10-19 @ 07:01  -  06-11-19 @ 06:08  --------------------------------------------------------  NET: -1850 mL      Diet: Diet, Regular (06-10-19 @ 11:14)    IV Fluids: Type: dextrose 5% + sodium chloride 0.45%. 1000 milliLiter(s) (50 mL/Hr) IV Continuous <Continuous>  sodium bicarbonate 650 milliGRAM(s) Oral three times a day    Physical Examination:  General Appearance: NAD, alert and cooperative  HEENT: NCAT, WNL  Heart: S1 and S2. No murmurs. RRR  Lungs: CTABL  Abdomen: Soft, nondistended, nontender  Extremities: peripheral pulses 2+, no peripheral edema, full ROM. Not TTP. Wound dressing is clean, dry, and w/o any oozing, blood, or purulent drainage.     Medications: [Standing]  ALPRAZolam 2 milliGRAM(s) Oral at bedtime  cefTRIAXone   IVPB 2 Gram(s) IV Intermittent every 24 hours  chlorhexidine 4% Liquid 1 Application(s) Topical <User Schedule>  dextrose 5% + sodium chloride 0.45%. 1000 milliLiter(s) (50 mL/Hr) IV Continuous <Continuous>  diazepam    Tablet 10 milliGRAM(s) Oral two times a day  docusate sodium 100 milliGRAM(s) Oral three times a day  dronabinol 2.5 milliGRAM(s) Oral daily  heparin  Injectable 5000 Unit(s) SubCutaneous every 8 hours  insulin lispro (HumaLOG) corrective regimen sliding scale   SubCutaneous three times a day before meals  methadone    Tablet 10 milliGRAM(s) Oral daily  metroNIDAZOLE  IVPB 500 milliGRAM(s) IV Intermittent every 8 hours  oxybutynin 5 milliGRAM(s) Oral daily  pantoprazole    Tablet 40 milliGRAM(s) Oral before breakfast  sevelamer carbonate 800 milliGRAM(s) Oral three times a day with meals  sodium bicarbonate 650 milliGRAM(s) Oral three times a day    DVT Prophylaxis: heparin  Injectable 5000 Unit(s) SubCutaneous every 8 hours    GI Prophylaxis: pantoprazole    Tablet 40 milliGRAM(s) Oral before breakfast    Antibiotics: cefTRIAXone   IVPB 2 Gram(s) IV Intermittent every 24 hours  metroNIDAZOLE  IVPB 500 milliGRAM(s) IV Intermittent every 8 hours    Anticoagulation:   Medications:[PRN]  morphine  - Injectable 2 milliGRAM(s) IV Push every 2 hours PRN  naloxone Injectable 0.4 milliGRAM(s) IV Push once PRN  oxyCODONE    IR 30 milliGRAM(s) Oral every 12 hours PRN  senna 2 Tablet(s) Oral daily PRN    Labs:                        7.5    8.13  )-----------( 435      ( 10 Lionel 2019 17:46 )             25.0     06-10    138  |  109  |  46<H>  ----------------------------<  106<H>  5.6<H>   |  16<L>  |  3.9<H>    Ca    7.6<L>      10 Lionel 2019 17:46  Phos  6.9     06-10  Mg     1.5     06-10        PT/INR - ( 09 Jun 2019 17:24 )   PT: 20.10 sec;   INR: 1.76 ratio    PTT - ( 09 Jun 2019 17:24 )  PTT:42.3 sec

## 2019-06-11 NOTE — PROGRESS NOTE ADULT - SUBJECTIVE AND OBJECTIVE BOX
Nephrology progress note    Patient is seen and examined, events over the last 24 h noted .    Allergies:  sulfamethizole (Unknown)    Hospital Medications:   MEDICATIONS  (STANDING):  ALPRAZolam 2 milliGRAM(s) Oral at bedtime  cefTRIAXone   IVPB 2 Gram(s) IV Intermittent every 24 hours  dextrose 5% + sodium chloride 0.45%. 1000 milliLiter(s) (50 mL/Hr) IV Continuous <Continuous>  diazepam    Tablet 10 milliGRAM(s) Oral two times a day  docusate sodium 100 milliGRAM(s) Oral three times a day  dronabinol 2.5 milliGRAM(s) Oral daily  heparin  Injectable 5000 Unit(s) SubCutaneous every 8 hours  insulin lispro (HumaLOG) corrective regimen sliding scale   SubCutaneous three times a day before meals  methadone    Tablet 10 milliGRAM(s) Oral daily  metroNIDAZOLE  IVPB 500 milliGRAM(s) IV Intermittent every 8 hours  oxybutynin 5 milliGRAM(s) Oral daily  pantoprazole    Tablet 40 milliGRAM(s) Oral before breakfast  sevelamer carbonate 800 milliGRAM(s) Oral three times a day with meals  sodium bicarbonate 650 milliGRAM(s) Oral three times a day        VITALS:  T(F): 96.7 (19 @ 08:30), Max: 97.8 (06-10-19 @ 12:00)  HR: 82 (19 @ 08:30)  BP: 117/57 (19 @ 08:30)  RR: 20 (19 @ 08:30)  SpO2: 97% (06-10-19 @ 11:20)  Wt(kg): --     @ 07:  -  06-10 @ 07:00  --------------------------------------------------------  IN: 0 mL / OUT: 750 mL / NET: -750 mL    06-10 @ 07:01  -   @ 07:00  --------------------------------------------------------  IN: 100 mL / OUT: 2650 mL / NET: -2550 mL          PHYSICAL EXAM:  Constitutional: NAD  HEENT: anicteric sclera, oropharynx clear, MMM  Neck: No JVD  Respiratory: CTAB, no wheezes, rales or rhonchi  Cardiovascular: S1, S2, RRR  Gastrointestinal: BS+, soft, NT/ND  Extremities: No cyanosis or clubbing. No peripheral edema  :  No brown.   Skin: No rashes    LABS:  06-10    138  |  109  |  46<H>  ----------------------------<  106<H>  5.6<H>   |  16<L>  |  3.9<H>    Creatinine Trend: 3.9<--, 4.2<--, 4.1<--, 4.0<--, 4.2<--, 4.3<--    Potassium Trend: 5.6<--, 5.2<--, 4.9<--, 4.7<--, 4.6<--    Ca    7.6<L>      10 Lionel 2019 17:46  Phos  6.9     10  Mg     1.5     06-10                            8.0    9.78  )-----------( 413      ( 2019 08:37 )             26.3       Urine Studies:  Urinalysis Basic - ( 2019 00:13 )    Color: Yellow / Appearance: Clear / S.010 / pH:   Gluc:  / Ketone: Negative  / Bili: Negative / Urobili: 0.2 mg/dL   Blood:  / Protein: 100 mg/dL / Nitrite: Negative   Leuk Esterase: Small / RBC: 1-2 /HPF / WBC 6-10 /HPF   Sq Epi:  / Non Sq Epi: Occasional /HPF / Bacteria: Few /HPF      Protein/Creatinine Ratio Calculation: 7 Ratio ( @ 00:13)  Creatinine, Random Urine: 39 mg/dL ( @ 00:13)  Chloride, Random Urine: 26 ( @ 00:13)  Sodium, Random Urine: 38.0 mmoL/L ( @ 00:13)  Osmolality, Random Urine: 275 mos/kg ( @ 00:13)  Creatinine, Random Urine: 16 mg/dL ( @ 18:10)  Potassium, Random Urine: 13 mmol/L ( @ 18:10)  Sodium, Random Urine: 33.0 mmoL/L ( @ 18:10)    RADIOLOGY & ADDITIONAL STUDIES: Nephrology progress note    Patient is seen and examined, events over the last 24 h noted .  no complaints no events   feels well   Allergies:  sulfamethizole (Unknown)    Hospital Medications:   MEDICATIONS  (STANDING):  ALPRAZolam 2 milliGRAM(s) Oral at bedtime  cefTRIAXone   IVPB 2 Gram(s) IV Intermittent every 24 hours  dextrose 5% + sodium chloride 0.45%. 1000 milliLiter(s) (50 mL/Hr) IV Continuous <Continuous>  diazepam    Tablet 10 milliGRAM(s) Oral two times a day  docusate sodium 100 milliGRAM(s) Oral three times a day  dronabinol 2.5 milliGRAM(s) Oral daily  heparin  Injectable 5000 Unit(s) SubCutaneous every 8 hours  insulin lispro (HumaLOG) corrective regimen sliding scale   SubCutaneous three times a day before meals  methadone    Tablet 10 milliGRAM(s) Oral daily  metroNIDAZOLE  IVPB 500 milliGRAM(s) IV Intermittent every 8 hours  oxybutynin 5 milliGRAM(s) Oral daily  pantoprazole    Tablet 40 milliGRAM(s) Oral before breakfast  sevelamer carbonate 800 milliGRAM(s) Oral three times a day with meals  sodium bicarbonate 650 milliGRAM(s) Oral three times a day        VITALS:  T(F): 96.7 (19 @ 08:30), Max: 97.8 (06-10-19 @ 12:00)  HR: 82 (19 @ 08:30)  BP: 117/57 (19 @ 08:30)  RR: 20 (19 @ 08:30)  SpO2: 97% (06-10-19 @ 11:20)       @ :  -  06-10 @ 07:00  --------------------------------------------------------  IN: 0 mL / OUT: 750 mL / NET: -750 mL    06-10 @ 07:01  -   @ 07:00  --------------------------------------------------------  IN: 100 mL / OUT: 2650 mL / NET: -2550 mL          PHYSICAL EXAM:  Constitutional: NAD  HEENT: anicteric sclera, oropharynx clear, MMM  Neck: No JVD  Respiratory: CTAB, no wheezes, rales or rhonchi  Cardiovascular: S1, S2, RRR  Gastrointestinal: BS+, soft, NT/ND  Extremities: No cyanosis or clubbing. right BKA   :  No brown.   Skin: No rashes    LABS:  06-10    138  |  109  |  46<H>  ----------------------------<  106<H>  5.6<H>   |  16<L>  |  3.9<H>    Creatinine Trend: 3.9<--, 4.2<--, 4.1<--, 4.0<--, 4.2<--, 4.3<--    Potassium Trend: 5.6<--, 5.2<--, 4.9<--, 4.7<--, 4.6<--    Ca    7.6<L>      10 Lionel 2019 17:46  Phos  6.9     10  Mg     1.5     06-10                            8.0    9.78  )-----------( 413      ( 2019 08:37 )             26.3       Urine Studies:  Urinalysis Basic - ( 2019 00:13 )    Color: Yellow / Appearance: Clear / S.010 / pH:   Gluc:  / Ketone: Negative  / Bili: Negative / Urobili: 0.2 mg/dL   Blood:  / Protein: 100 mg/dL / Nitrite: Negative   Leuk Esterase: Small / RBC: 1-2 /HPF / WBC 6-10 /HPF   Sq Epi:  / Non Sq Epi: Occasional /HPF / Bacteria: Few /HPF      Protein/Creatinine Ratio Calculation: 7 Ratio ( @ 00:13)  Creatinine, Random Urine: 39 mg/dL ( @ 00:13)  Chloride, Random Urine: 26 ( @ 00:13)  Sodium, Random Urine: 38.0 mmoL/L ( @ 00:13)  Osmolality, Random Urine: 275 mos/kg ( @ 00:13)  Creatinine, Random Urine: 16 mg/dL ( @ 18:10)  Potassium, Random Urine: 13 mmol/L ( @ 18:10)  Sodium, Random Urine: 33.0 mmoL/L ( @ 18:10)    RADIOLOGY & ADDITIONAL STUDIES:

## 2019-06-11 NOTE — MEDICAL STUDENT PROGRESS NOTE(EDUCATION) - NS MD HP STUD ASPLAN ASSES FT
Patient is a 59 yo male with pmh of lumbar compression fracture at the age of 24, resulting in parapalegia/ wheelchair bound with subsequent sacral decubiti and heel ulcers s/p debridements in the past and currently( managed by Dr. 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

## 2019-06-11 NOTE — PROGRESS NOTE ADULT - ASSESSMENT
ASSESSMENT  58 M w/ LE weakness 2/2 lumbar compression fracture at the age of 24, resulting in paraplegia wheelchair bound (can move LE but very weak, and has some sensation), with subsequent sacral decubiti and heel ulcers s/p debridements in the past, PAD, suprapubic cath, Chronic pain, Neuropathy, Insomnia, DM2 & HTN that resolved w/ weight loss, is BIBA c/o foul smell & drainage from his RLE    PROBLEMS  #Sepsis ruled out on admission, systolic hypotension, WBC>12  Necrotizing fasciitis s/p OR 6/4 Right Below knee guillotine amputation  Pt has an acute illness which poses threat to bodily function, resolved   BCX NG  OR cultures with Ecoli (R unasyn, fluoro, bactrim, I cefazolin)   2/p OR 6/10 with debridement   #SILVINA   #Hyponatremia/Hypomagnesia  #Hypotension 2/2 opioids   #R chest IV phlebitis    RECOMMENDATIONS  - Ceftriaxone 2g q24h and flagyl 500mg q8h IV  - D/C abx post closure    Spectra [id number]

## 2019-06-11 NOTE — PROGRESS NOTE ADULT - SUBJECTIVE AND OBJECTIVE BOX
Patient: MARGE BELLA , 58y (1960)Male     Procedure/Diagnosis: s/p sharp excisional debridement and washout of right BKA guillotine amputation stump.  Events over 24h: No acute overnight events, patient seen and examined bedside with no active complaints.     Vitals: T(F): 96.8 (06-11-19 @ 04:29), Max: 97.8 (06-10-19 @ 12:00)  HR: 80 (06-11-19 @ 04:29)  BP: 121/56 (06-11-19 @ 04:29) (92/53 - 121/56)  RR: 18 (06-11-19 @ 04:29)  SpO2: 97% (06-10-19 @ 11:20)    In:   06-09-19 @ 07:01  -  06-10-19 @ 07:00  --------------------------------------------------------  IN: 0 mL    06-10-19 @ 07:01  -  06-11-19 @ 06:08  --------------------------------------------------------  IN: 100 mL      Out:   06-09-19 @ 07:01  -  06-10-19 @ 07:00  --------------------------------------------------------  OUT:    Indwelling Catheter - Suprapubic: 750 mL  Total OUT: 750 mL      06-10-19 @ 07:01  -  06-11-19 @ 06:08  --------------------------------------------------------  OUT:    Indwelling Catheter - Suprapubic: 1950 mL  Total OUT: 1950 mL        Net:   06-09-19 @ 07:01  -  06-10-19 @ 07:00  --------------------------------------------------------  NET: -750 mL    06-10-19 @ 07:01  -  06-11-19 @ 06:08  --------------------------------------------------------  NET: -1850 mL      Diet: Diet, Regular (06-10-19 @ 11:14)    IV Fluids: Type: dextrose 5% + sodium chloride 0.45%. 1000 milliLiter(s) (50 mL/Hr) IV Continuous <Continuous>  sodium bicarbonate 650 milliGRAM(s) Oral three times a day    Physical Examination:  General Appearance: NAD, alert and cooperative  HEENT: NCAT, WNL  Heart: S1 and S2. No murmurs. RRR  Lungs: CTABL  Abdomen: Soft, nondistended, nontender  Extremities: peripheral pulses 2+, no peripheral edema, full ROM    Medications: [Standing]  ALPRAZolam 2 milliGRAM(s) Oral at bedtime  cefTRIAXone   IVPB 2 Gram(s) IV Intermittent every 24 hours  chlorhexidine 4% Liquid 1 Application(s) Topical <User Schedule>  dextrose 5% + sodium chloride 0.45%. 1000 milliLiter(s) (50 mL/Hr) IV Continuous <Continuous>  diazepam    Tablet 10 milliGRAM(s) Oral two times a day  docusate sodium 100 milliGRAM(s) Oral three times a day  dronabinol 2.5 milliGRAM(s) Oral daily  heparin  Injectable 5000 Unit(s) SubCutaneous every 8 hours  insulin lispro (HumaLOG) corrective regimen sliding scale   SubCutaneous three times a day before meals  methadone    Tablet 10 milliGRAM(s) Oral daily  metroNIDAZOLE  IVPB 500 milliGRAM(s) IV Intermittent every 8 hours  oxybutynin 5 milliGRAM(s) Oral daily  pantoprazole    Tablet 40 milliGRAM(s) Oral before breakfast  sevelamer carbonate 800 milliGRAM(s) Oral three times a day with meals  sodium bicarbonate 650 milliGRAM(s) Oral three times a day    DVT Prophylaxis: heparin  Injectable 5000 Unit(s) SubCutaneous every 8 hours    GI Prophylaxis: pantoprazole    Tablet 40 milliGRAM(s) Oral before breakfast    Antibiotics: cefTRIAXone   IVPB 2 Gram(s) IV Intermittent every 24 hours  metroNIDAZOLE  IVPB 500 milliGRAM(s) IV Intermittent every 8 hours    Anticoagulation:   Medications:[PRN]  morphine  - Injectable 2 milliGRAM(s) IV Push every 2 hours PRN  naloxone Injectable 0.4 milliGRAM(s) IV Push once PRN  oxyCODONE    IR 30 milliGRAM(s) Oral every 12 hours PRN  senna 2 Tablet(s) Oral daily PRN    Labs:                        7.5    8.13  )-----------( 435      ( 10 Lionel 2019 17:46 )             25.0     06-10    138  |  109  |  46<H>  ----------------------------<  106<H>  5.6<H>   |  16<L>  |  3.9<H>    Ca    7.6<L>      10 Lionel 2019 17:46  Phos  6.9     06-10  Mg     1.5     06-10        PT/INR - ( 09 Jun 2019 17:24 )   PT: 20.10 sec;   INR: 1.76 ratio    PTT - ( 09 Jun 2019 17:24 )  PTT:42.3 sec

## 2019-06-11 NOTE — PROGRESS NOTE ADULT - SUBJECTIVE AND OBJECTIVE BOX
MARGE BELLA  58y, Male  Allergy: sulfamethizole (Unknown)      CHIEF COMPLAINT: Hemodynamic monitoring. sepsis (06 Jun 2019 03:58)      INTERVAL EVENTS/HPI  - No acute events overnight  - T(F): , Max: 97.8 (06-10-19 @ 12:00)  - Denies any worsening symptoms  - Tolerating medication  - WBC Count: 9.78 K/uL (06-11-19 @ 08:37)      ROS  Negative except as per noted above in HPI  Denies cough  Denies diarrhea  Denies dysuria   Denies pain at any IV site     FH noncontributory    VITALS:  T(F): 96.7, Max: 97.8 (06-10-19 @ 12:00)  HR: 82  BP: 117/57  RR: 20Vital Signs Last 24 Hrs  T(C): 35.9 (11 Jun 2019 08:30), Max: 36.6 (10 Lionel 2019 12:00)  T(F): 96.7 (11 Jun 2019 08:30), Max: 97.8 (10 Lionel 2019 12:00)  HR: 82 (11 Jun 2019 08:30) (78 - 84)  BP: 117/57 (11 Jun 2019 08:30) (94/57 - 121/56)  BP(mean): --  RR: 20 (11 Jun 2019 08:30) (15 - 20)  SpO2: 97% (10 Lionel 2019 11:20) (96% - 99%)    PHYSICAL EXAM:  Gen: NAD, resting in bed  HEENT: Normocephalic, atraumatic  Neck: supple, no lymphadenopathy  CV: Regular rate & regular rhythm  Lungs: decreased BS at bases, no fremitus  Abdomen: Soft, BS present spc  Ext: Warm, well perfused, R amputation dressings   Neuro: non focal, awake  Skin: no rash, no erythema          TESTS & MEASUREMENTS:                        8.0    9.78  )-----------( 413      ( 11 Jun 2019 08:37 )             26.3     06-10    138  |  109  |  46<H>  ----------------------------<  106<H>  5.6<H>   |  16<L>  |  3.9<H>    Ca    7.6<L>      10 Lionel 2019 17:46  Phos  7.0     06-11  Mg     1.5     06-11      eGFR if Non African American: 16 mL/min/1.73M2 (06-10-19 @ 17:46)  eGFR if : 19 mL/min/1.73M2 (06-10-19 @ 17:46)          Culture - Blood (collected 06-05-19 @ 04:37)  Source: .Blood None  Final Report (06-10-19 @ 14:00):    No growth at 5 days.            INFECTIOUS DISEASES TESTING      RADIOLOGY & ADDITIONAL TESTS:  I have personally reviewed the last Chest xray  CXR      CT      CARDIOLOGY TESTING  Transthoracic Echocardiogram:    EXAM:  2-D ECHO (TTE) COMPLETE        PROCEDURE DATE:  06/04/2019      INTERPRETATION:  REPORT:    TRANSTHORACIC ECHOCARDIOGRAM REPORT         Patient Name:   MARGE BELLA Accession #: [id number]  Medical Rec #:  [id number]   Height:      72.0 in 182.9 cm  YOB: 1960  Weight:      150.0 lb 68.04 kg  Patient Age:    58 years   BSA:         1.89 m²  Patient Gender: M          BP:          95/55 mmHg       Date of Exam:        6/4/2019 5:48:30 PM  Referring Physician: [id number] ED UNASSIGNED  Sonographer:         Kira Hawkins  Reading Physician:   Elliot Díaz MD.    Procedure:     2D Echo/Doppler/Color Doppler Complete.  Indications:   I42.9 - Cardiomyopathy, unspecified  Diagnosis:     I42.9 - Cardiomyopathy, unspecified  Study Details: Technically good study.         Summary:   1. Left ventricular ejection fraction, by visual estimation, is 65 to   70%.   2. Technically good study.   3. Normal left ventricular size and wall thicknesses, with normal   systolic and diastolic function.   4. The mean global longitudinal strain by speckle tracking is -20.2%   which is normal.   5. Moderate mitral annular calcification.   6. LA volume Index is 31.5 ml/m² ml/m2.    PHYSICIAN INTERPRETATION:  Left Ventricle: Normal left ventricular size and wall thicknesses, with   normal systolic and diastolic function. Left ventricular ejection   fraction, by visual estimation, is 65 to 70%. The mean global   longitudinal strain by speckle tracking is -20.2% which is normal.  Right Ventricle: Normal right ventricular size and function.  Left Atrium: Normal left atrial size. LA volume Index is 31.5 ml/m² ml/m2.  Right Atrium: Normal right atrial size.  Pericardium: There is no evidence of pericardial effusion.  Mitral Valve: Structurally normal mitral valve, with normal leaflet   excursion. The mitral valve is normal in structure. There is moderate   mitral annular calcification. Mild mitral valve regurgitation is seen.  Tricuspid Valve: Structurally normal tricuspid valve, with normal leaflet   excursion. The tricuspid valve is normal in structure. Mild tricuspid   regurgitation is visualized.  Aortic Valve: Normal trileaflet aortic valve with normal opening. The   aortic valve is normal. No evidence of aortic valve regurgitation is seen.  Pulmonic Valve: Structurally normal pulmonic valve, with normal leaflet   excursion. The pulmonic valve is normal. No indication of pulmonic valve   regurgitation.  Aorta: The aortic root and ascending aorta are structurally normal, with   no evidence of dilitation.  Pulmonary Artery: The main pulmonary artery is normal in size.  Venous: The inferior vena cava was normal sized, with respiratory size   variation greater than 50%.       2D AND M-MODE MEASUREMENTS (normal ranges within parentheses):  Left Ventricle:                  Normal   Aorta/Left Atrium:               Normal  IVSd (2D):              0.99 cm (0.7-1.1) AoV Cusp Separation: 2.14 cm   (1.5-2.6)  LVPWd (2D):             1.01 cm (0.7-1.1) Left Atrium (Mmode): 4.22 cm   (1.9-4.0)  LVIDd (2D):             4.31 cm (3.4-5.7) Right Ventricle:  LVIDs (2D):             3.12 cm           RVd (2D):        2.78 cm  LV FS (2D):             27.8 %   (>25%)  Relative Wall Thickness  0.47    (<0.42)    SPECTRAL DOPPLER ANALYSIS:  LV DIASTOLIC FUNCTION:  MV Peak E: 1.06 m/s Decel Time: 191 msec  MV Peak A: 1.40 m/s  E/A Ratio: 0.76    Aortic Valve:  AoV VMax:    1.74 m/s  AoV Area, Vmax: 2.55 cm² Vmax Indx: 1.35 cm²/m²  AoV Pk Grad: 12.1 mmHg    LVOT Vmax: 1.41 m/s  LVOT VTI:  0.37 m  LVOT Diam: 2.00 cm    Mitral Valve:  MV P1/2 Time: 55.33 msec  MV Area, PHT: 3.98 cm²    Tricuspid Valve and PA/RV Systolic Pressure: TR Max Velocity: 2.63 m/s RA   Pressure: 3 mmHg RVSP/PASP: 30.7 mmHg       [id number] Elliot Díaz MD, Electronically signed on 6/4/2019 at 10:00:57   PM              *** Final ***                    ELLIOT DÍAZ MD  This document has been electronically signed. Jun 4 2019  5:48PM             (06-04-19 @ 17:48)  12 Lead ECG:   Ventricular Rate 80 BPM    Atrial Rate 80 BPM    P-R Interval 184 ms    QRS Duration 98 ms    Q-T Interval 400 ms    QTC Calculation(Bezet) 461 ms    P Axis 48 degrees    R Axis -22 degrees    T Axis 31 degrees    Diagnosis Line Normal sinus rhythm  Normal ECG    Confirmed by NICOLE MTZ, ELLIOT ([id number]) on 6/4/2019 3:26:26 PM (06-04-19 @ 13:45)      MEDICATIONS  ALPRAZolam 2  cefTRIAXone   IVPB 2  chlorhexidine 4% Liquid 1  dextrose 5% + sodium chloride 0.45%. 1000  diazepam    Tablet 10  docusate sodium 100  dronabinol 2.5  heparin  Injectable 5000  insulin lispro (HumaLOG) corrective regimen sliding scale   methadone    Tablet 10  metroNIDAZOLE  IVPB 500  oxybutynin 5  pantoprazole    Tablet 40  sevelamer carbonate 800  sodium bicarbonate 650      ANTIBIOTICS:  cefTRIAXone   IVPB 2 Gram(s) IV Intermittent every 24 hours  metroNIDAZOLE  IVPB 500 milliGRAM(s) IV Intermittent every 8 hours

## 2019-06-12 NOTE — MEDICAL STUDENT PROGRESS NOTE(EDUCATION) - NS MD HP STUD ASPLAN PLAN FT
-transfused prbc (monitor CBC)  -pain control  -GI/DVT ppx  -incentive spirometer  -Diet,regular  -c/w monitoring BP

## 2019-06-12 NOTE — PROGRESS NOTE ADULT - SUBJECTIVE AND OBJECTIVE BOX
Procedure: s/p right bka , right bka washout      PAST MEDICAL & SURGICAL HISTORY:  Hypertension  Suprapubic catheter  Pressure ulcer  Diabetes  Lumbar compression fracture  S/P debridement  Toe amputation status, right  H/O hernia repair      Events of the Last 24h:none  Vital Signs Last 24 Hrs  T(C): 35.9 (2019 20:19), Max: 36.1 (2019 12:30)  T(F): 96.6 (2019 20:19), Max: 96.9 (2019 12:30)  HR: 79 (2019 20:19) (79 - 98)  BP: 109/58 (2019 20:19) (109/58 - 121/56)  BP(mean): --  RR: 18 (2019 20:19) (18 - 20)  SpO2: --        Diet, Regular (06-10-19 @ 11:14)      I&O's Summary    10 Lionel 2019 07  -  2019 07:00  --------------------------------------------------------  IN: 100 mL / OUT: 2650 mL / NET: -2550 mL    2019 07  -  2019 02:41  --------------------------------------------------------  IN: 0 mL / OUT: 1500 mL / NET: -1500 mL     I&O's Detail    10 Lionel 2019 07:  -  2019 07:00  --------------------------------------------------------  IN:    sodium chloride 0.9%: 100 mL  Total IN: 100 mL    OUT:    Indwelling Catheter - Suprapubic: 2650 mL  Total OUT: 2650 mL    Total NET: -2550 mL      2019 07:  -  2019 02:41  --------------------------------------------------------  IN:  Total IN: 0 mL    OUT:    Indwelling Catheter - Suprapubic: 1500 mL  Total OUT: 1500 mL    Total NET: -1500 mL          MEDICATIONS  (STANDING):  ALPRAZolam 2 milliGRAM(s) Oral at bedtime  cefTRIAXone   IVPB 2 Gram(s) IV Intermittent every 24 hours  chlorhexidine 4% Liquid 1 Application(s) Topical <User Schedule>  dextrose 5% + sodium chloride 0.45%. 1000 milliLiter(s) (50 mL/Hr) IV Continuous <Continuous>  diazepam    Tablet 10 milliGRAM(s) Oral two times a day  docusate sodium 100 milliGRAM(s) Oral three times a day  dronabinol 2.5 milliGRAM(s) Oral daily  heparin  Injectable 5000 Unit(s) SubCutaneous every 8 hours  insulin lispro (HumaLOG) corrective regimen sliding scale   SubCutaneous three times a day before meals  methadone    Tablet 10 milliGRAM(s) Oral daily  metroNIDAZOLE  IVPB 500 milliGRAM(s) IV Intermittent every 8 hours  oxybutynin 5 milliGRAM(s) Oral daily  pantoprazole    Tablet 40 milliGRAM(s) Oral before breakfast  sevelamer carbonate 800 milliGRAM(s) Oral three times a day with meals  sodium bicarbonate 650 milliGRAM(s) Oral three times a day    MEDICATIONS  (PRN):  morphine  - Injectable 2 milliGRAM(s) IV Push every 2 hours PRN Severe Pain (7 - 10)  naloxone Injectable 0.4 milliGRAM(s) IV Push once PRN OVERDOSE, DO NOT GIVE WITHOUT PROVIDER APPROVAL  oxyCODONE    IR 30 milliGRAM(s) Oral every 12 hours PRN Moderate Pain (4 - 6)  senna 2 Tablet(s) Oral daily PRN Constipation      PHYSICAL EXAM:    GENERAL: NAD    HEENT: NCAT    CHEST/LUNGS: CTAB    HEART: RRR,  No murmurs, rubs, or gallops    ABDOMEN: SNTND +BS    EXTREMITIES:  FROM, No clubbing, cyanosis, or edema, palpable pulse, right bka     NEURO: No focal neurological deficits    SKIN: No rashes or lesions    INCISION/WOUNDS:                          8.0    9.78  )-----------( 413      ( 2019 08:37 )             26.3        CBC Full  -  ( 2019 08:37 )  WBC Count : 9.78 K/uL  RBC Count : 3.30 M/uL  Hemoglobin : 8.0 g/dL  Hematocrit : 26.3 %  Platelet Count - Automated : 413 K/uL  Mean Cell Volume : 79.7 fL  Mean Cell Hemoglobin : 24.2 pg  Mean Cell Hemoglobin Concentration : 30.4 g/dL  Auto Neutrophil # : 7.05 K/uL  Auto Lymphocyte # : 1.78 K/uL  Auto Monocyte # : 0.48 K/uL  Auto Eosinophil # : 0.33 K/uL  Auto Basophil # : 0.08 K/uL  Auto Neutrophil % : 72.1 %  Auto Lymphocyte % : 18.2 %  Auto Monocyte % : 4.9 %  Auto Eosinophil % : 3.4 %  Auto Basophil % : 0.8 %               139   |  111   |  44                 Ca: 7.5    BMP:   ----------------------------< 78     M.5   (19 @ 08:37)             5.6    |  18    | 4.2                Ph: 7.0      LFT:     TPro: 4.8 / Alb: 1.3 / TBili: <0.2 / DBili: 0.2 / AST: 12 / ALT: 9 / AlkPhos: 189   (19 @ 10:13)

## 2019-06-12 NOTE — MEDICAL STUDENT PROGRESS NOTE(EDUCATION) - NS MD HP STUD ASPLAN ASSES FT
Patient is a 57 yo male with pmh of lumbar compression fracture at the age of 24, resulting in parapalegia/ wheelchair bound with subsequent sacral decubiti and heel ulcers s/p debridements in the past and currently( managed by Dr. 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

## 2019-06-12 NOTE — MEDICAL STUDENT PROGRESS NOTE(EDUCATION) - SUBJECTIVE AND OBJECTIVE BOX
Patient is a 57 yo male with pmh of lumbar compression fracture at the age of 24, resulting in parapalegia/ wheelchair bound with subsequent sacral decubiti and heel ulcers s/p debridements in the past and currently(managed by Dr. Gooden), PAD, suprapubic cath, chronic pain, neuropathy, and DM2 that is admitted with a diagnosis of right LE wet gangrene s/p BKA on .  Today is hospital day 8 and POD #8. Patient to go to OR on  for washout.    Events over 24h: The patient was seen and examined and reports he feels well. He reports he has been eating, drinking, and voiding via catheter, wound dressing is dry, clean, and no oozing, blood, or pus observed. Pt states he had BM yesterday. extremity. There were no other overnight events.     Events of the Last 24h:none  Vital Signs Last 24 Hrs  T(C): 35.9 (2019 20:19), Max: 36.1 (2019 12:30)  T(F): 96.6 (2019 20:19), Max: 96.9 (2019 12:30)  HR: 79 (2019 20:19) (79 - 98)  BP: 109/58 (2019 20:19) (109/58 - 121/56)  BP(mean): --  RR: 18 (2019 20:19) (18 - 20)  SpO2: --        Diet, Regular (06-10-19 @ 11:14)      I&O's Summary    10 Lionel 2019 07:  -  2019 07:00  --------------------------------------------------------  IN: 100 mL / OUT: 2650 mL / NET: -2550 mL    2019 07:  -  2019 02:41  --------------------------------------------------------  IN: 0 mL / OUT: 1500 mL / NET: -1500 mL     I&O's Detail    10 Lionel 2019 07:  -  2019 07:00  --------------------------------------------------------  IN:    sodium chloride 0.9%: 100 mL  Total IN: 100 mL    OUT:    Indwelling Catheter - Suprapubic: 2650 mL  Total OUT: 2650 mL    Total NET: -2550 mL      2019 07:  -  2019 02:41  --------------------------------------------------------  IN:  Total IN: 0 mL    OUT:    Indwelling Catheter - Suprapubic: 1500 mL  Total OUT: 1500 mL    Total NET: -1500 mL          MEDICATIONS  (STANDING):  ALPRAZolam 2 milliGRAM(s) Oral at bedtime  cefTRIAXone   IVPB 2 Gram(s) IV Intermittent every 24 hours  chlorhexidine 4% Liquid 1 Application(s) Topical <User Schedule>  dextrose 5% + sodium chloride 0.45%. 1000 milliLiter(s) (50 mL/Hr) IV Continuous <Continuous>  diazepam    Tablet 10 milliGRAM(s) Oral two times a day  docusate sodium 100 milliGRAM(s) Oral three times a day  dronabinol 2.5 milliGRAM(s) Oral daily  heparin  Injectable 5000 Unit(s) SubCutaneous every 8 hours  insulin lispro (HumaLOG) corrective regimen sliding scale   SubCutaneous three times a day before meals  methadone    Tablet 10 milliGRAM(s) Oral daily  metroNIDAZOLE  IVPB 500 milliGRAM(s) IV Intermittent every 8 hours  oxybutynin 5 milliGRAM(s) Oral daily  pantoprazole    Tablet 40 milliGRAM(s) Oral before breakfast  sevelamer carbonate 800 milliGRAM(s) Oral three times a day with meals  sodium bicarbonate 650 milliGRAM(s) Oral three times a day    MEDICATIONS  (PRN):  morphine  - Injectable 2 milliGRAM(s) IV Push every 2 hours PRN Severe Pain (7 - 10)  naloxone Injectable 0.4 milliGRAM(s) IV Push once PRN OVERDOSE, DO NOT GIVE WITHOUT PROVIDER APPROVAL  oxyCODONE    IR 30 milliGRAM(s) Oral every 12 hours PRN Moderate Pain (4 - 6)  senna 2 Tablet(s) Oral daily PRN Constipation      PHYSICAL EXAM:    GENERAL: NAD    HEENT: NCAT    CHEST/LUNGS: CTAB    HEART: RRR,  No murmurs, rubs, or gallops    ABDOMEN: SNTND +BS    EXTREMITIES:  FROM, No clubbing, cyanosis, or edema, right bka     NEURO: No focal neurological deficits    SKIN: No rashes or lesions    INCISION/WOUNDS: Clean, dry dressing w/o oozing blood or purulent drainage.                          8.0    9.78  )-----------( 413      ( 2019 08:37 )             26.3        CBC Full  -  ( 2019 08:37 )  WBC Count : 9.78 K/uL  RBC Count : 3.30 M/uL  Hemoglobin : 8.0 g/dL  Hematocrit : 26.3 %  Platelet Count - Automated : 413 K/uL  Mean Cell Volume : 79.7 fL  Mean Cell Hemoglobin : 24.2 pg  Mean Cell Hemoglobin Concentration : 30.4 g/dL  Auto Neutrophil # : 7.05 K/uL  Auto Lymphocyte # : 1.78 K/uL  Auto Monocyte # : 0.48 K/uL  Auto Eosinophil # : 0.33 K/uL  Auto Basophil # : 0.08 K/uL  Auto Neutrophil % : 72.1 %  Auto Lymphocyte % : 18.2 %  Auto Monocyte % : 4.9 %  Auto Eosinophil % : 3.4 %  Auto Basophil % : 0.8 %               139   |  111   |  44                 Ca: 7.5    BMP:   ----------------------------< 78     M.5   (19 @ 08:37)             5.6    |  18    | 4.2                Ph: 7.0      LFT:     TPro: 4.8 / Alb: 1.3 / TBili: <0.2 / DBili: 0.2 / AST: 12 / ALT: 9 / AlkPhos: 189   (19 @ 10:13)

## 2019-06-12 NOTE — PROGRESS NOTE ADULT - ASSESSMENT
58 M w/ PMH of Paraplegia 2/2 Lumbar compression Fx, sacral decubiti and heel ulcers, PAD, suprapubic cath, Neuropathy, DM2 & HTN, admitted with Rt foor gangrene,  found to be anemic, in SILVINA, & gangrene of RLE. Renal is consulted for SILVINA. sp right BKA sp revision.    # SILVINA - creatinine trending up today   - needs IVF 75 cc/hr  - creat was 1.3 mg% in Aug 2018, kidney sono no hydro, echogenic kidneys  # non oliguric   # on Rocephin / flagyl  now as per ID until wound closure   #continue sodium bicarb   # K noted  5.6; needs low K diet, insulin and glucose, may give Kayexalate 15 grams today since pt is going for wash out tomorrow ; repeat BMP later  # ph noted, corrected calcium around 9.2, on renagel CONT, increase to 1600 mg tid with meals  # low MAg 1.5 - mag rider 2 gr IV x1  # h/h noted, check iron stores, tx if h <7  #   no acute indication for RRT     # will follow

## 2019-06-12 NOTE — PROCEDURE NOTE - NSINFORMCONSENT_GEN_A_CORE
patient/Benefits, risks, and possible complications of procedure explained to patient/caregiver who verbalized understanding and gave verbal consent.

## 2019-06-12 NOTE — PROCEDURE NOTE - NSPOSTCAREGUIDE_GEN_A_CORE
Care for catheter as per unit/ICU protocols
Instructed patient/caregiver to follow-up with primary care physician/Keep the cast/splint/dressing clean and dry/Care for catheter as per unit/ICU protocols/Instructed patient/caregiver regarding signs and symptoms of infection/Verbal/written post procedure instructions were given to patient/caregiver

## 2019-06-12 NOTE — PROGRESS NOTE ADULT - SUBJECTIVE AND OBJECTIVE BOX
GENERAL SURGERY PROGRESS NOTE     MARGE BELLA  58y  Male  Hospital day :8d  Procedure: Irrigation and excisional debridement of tissue down to bone  Guillotine amputation below knee    OVERNIGHT EVENTS:  No acute events overnight. Pain controlled.   T(F): 96.6 (06-11-19 @ 20:19), Max: 96.9 (06-11-19 @ 12:30)  HR: 79 (06-11-19 @ 20:19) (79 - 98)  BP: 109/58 (06-11-19 @ 20:19) (109/58 - 121/56)  RR: 18 (06-11-19 @ 20:19) (18 - 20)    DIET/FLUIDS: dextrose 5% + sodium chloride 0.45%. 1000 milliLiter(s) IV Continuous <Continuous>  sodium bicarbonate 650 milliGRAM(s) Oral three times a day       GI proph:  pantoprazole    Tablet 40 milliGRAM(s) Oral before breakfast    AC/ proph: heparin  Injectable 5000 Unit(s) SubCutaneous every 8 hours    ABx: cefTRIAXone   IVPB 2 Gram(s) IV Intermittent every 24 hours  metroNIDAZOLE  IVPB 500 milliGRAM(s) IV Intermittent every 8 hours      PHYSICAL EXAM:  GENERAL: NAD, well-appearing  CHEST/LUNG: no obvious increased wob   EXTREMITIES:  LLE guillotine amputation, skin around site is pink and there is no drainage or erythema noted. Dressing changed, c/d/i. able to move leg.       LABS  POCT Blood Glucose.: 89 mg/dL (11 Jun 2019 21:39)  POCT Blood Glucose.: 89 mg/dL (11 Jun 2019 16:01)  POCT Blood Glucose.: 85 mg/dL (11 Jun 2019 11:11)  POCT Blood Glucose.: 83 mg/dL (11 Jun 2019 07:13)                          8.0    9.78  )-----------( 413      ( 11 Jun 2019 08:37 )             26.3       Auto Neutrophil %: 72.1 % (06-11-19 @ 08:37)  Auto Immature Granulocyte %: 0.6 % (06-11-19 @ 08:37)    06-11    139  |  111<H>  |  44<H>  ----------------------------<  78  5.6<H>   |  18  |  4.2<HH>      Calcium, Total Serum: 7.5 mg/dL (06-11-19 @ 08:37)

## 2019-06-12 NOTE — PROGRESS NOTE ADULT - ASSESSMENT
Assessment:  58y Male patient admitted S/P sharp excisional debridement and washout of right BKA guillotine amputation stump.    Plan:  Diet regular  Pain control  Antibiotics  OR Thursday for washout of right BKA, will transfuse 1 UPRBC today  Encourage incentive spirometer use

## 2019-06-12 NOTE — CHART NOTE - NSCHARTNOTEFT_GEN_A_CORE
Vascular Surgery Pre-op Note    Patient is a 58y old  Male who presents with a chief complaint of Hemodynamic monitoring. sepsis (2019 03:58)  right foot gangrene  chronic anemia, requiring blood transfusion for pre-operative optimization. Non-working midline on the left arm    Procedure: revision and closure of right BKA  Surgeon: Dr. Muñiz    Vitals/Labs:  Vital Signs Last 24 Hrs  T(C): 35.8 (2019 06:55), Max: 36.1 (2019 12:30)  T(F): 96.5 (2019 06:55), Max: 96.9 (2019 12:30)  HR: 77 (2019 06:55) (77 - 98)  BP: 114/65 (2019 06:55) (109/58 - 117/64)  BP(mean): --  RR: 18 (2019 06:55) (18 - 20)  SpO2: --    I&O's Detail    2019 07:01  -  2019 07:00  --------------------------------------------------------  IN:  Total IN: 0 mL    OUT:    Indwelling Catheter - Suprapubic: 2075 mL  Total OUT: 2075 mL    Total NET: -2075 mL                                8.0    9.78  )-----------( 413      ( 2019 08:37 )             26.3       06-11    139  |  111<H>  |  44<H>  ----------------------------<  78  5.6<H>   |  18  |  4.2<HH>    Ca    7.5<L>      2019 08:37  Phos  7.0     06-11  Mg     1.5     06-11        CAPILLARY BLOOD GLUCOSE      POCT Blood Glucose.: 89 mg/dL (2019 11:00)  POCT Blood Glucose.: 87 mg/dL (2019 07:11)  POCT Blood Glucose.: 89 mg/dL (2019 21:39)  POCT Blood Glucose.: 89 mg/dL (2019 16:01)                  T&S:   19    Imaging:   Chest X RAY:  < from: Xray Chest 1 View- PORTABLE-Routine (19 @ 08:09) >    Left basilar focal atelectasis.      EK/4/19      Assessment & Plan:  MARGE BELLA 58y Male    - NPO after midnight  - IVF while NPO  - Pain Control  - Inputs and outputs  - DVT ppx  - tranfuse 1 u PRBC, repeat CBC after blood transfusion  2u PRBC on hold to OR  - IR for midline insertion     MEDICATIONS  (STANDING):  ALPRAZolam 2 milliGRAM(s) Oral at bedtime  cefTRIAXone   IVPB 2 Gram(s) IV Intermittent every 24 hours  chlorhexidine 4% Liquid 1 Application(s) Topical <User Schedule>  dextrose 5% + sodium chloride 0.45%. 1000 milliLiter(s) (50 mL/Hr) IV Continuous <Continuous>  diazepam    Tablet 10 milliGRAM(s) Oral two times a day  docusate sodium 100 milliGRAM(s) Oral three times a day  dronabinol 2.5 milliGRAM(s) Oral daily  heparin  Injectable 5000 Unit(s) SubCutaneous every 8 hours  insulin lispro (HumaLOG) corrective regimen sliding scale   SubCutaneous three times a day before meals  methadone    Tablet 10 milliGRAM(s) Oral daily  metroNIDAZOLE  IVPB 500 milliGRAM(s) IV Intermittent every 8 hours  oxybutynin 5 milliGRAM(s) Oral daily  pantoprazole    Tablet 40 milliGRAM(s) Oral before breakfast  sevelamer carbonate 800 milliGRAM(s) Oral three times a day with meals  sodium bicarbonate 650 milliGRAM(s) Oral three times a day    MEDICATIONS  (PRN):  morphine  - Injectable 2 milliGRAM(s) IV Push every 2 hours PRN Severe Pain (7 - 10)  naloxone Injectable 0.4 milliGRAM(s) IV Push once PRN OVERDOSE, DO NOT GIVE WITHOUT PROVIDER APPROVAL  oxyCODONE    IR 30 milliGRAM(s) Oral every 12 hours PRN Moderate Pain (4 - 6)  senna 2 Tablet(s) Oral daily PRN Constipation

## 2019-06-13 NOTE — BRIEF OPERATIVE NOTE - NSICDXBRIEFPOSTOP_GEN_ALL_CORE_FT
POST-OP DIAGNOSIS:  Gangrene of right foot 04-Jun-2019 11:44:50  Jacob Santana
POST-OP DIAGNOSIS:  S/P below knee amputation, right 10-Lionel-2019 11:16:55  Jacob Santana  Gangrene of right foot 04-Jun-2019 11:44:50  Jacob Santana
POST-OP DIAGNOSIS:  Gangrene of right foot 04-Jun-2019 11:44:50  Jacob Santana

## 2019-06-13 NOTE — PROGRESS NOTE ADULT - ASSESSMENT
58 M w/ PMH of Paraplegia 2/2 Lumbar compression Fx, sacral decubiti and heel ulcers s/p debridements,, PAD, suprapubic cath,Neuropathy, DM2 & HTN that resolved w/ weight loss, is BIBA c/o foul smell & drainage from his RLE, found to be anemic, in SILVINA, & gangrene of RLE. Renal is consulted for SILVINA. sp right BKA sp revision    going for washout    # creatinine stable  # patient with significant decrease in GFR , in view of recent BKA, and low muscle mass  # non oliguric   # on Rocephin / flagyl  now as per ID until wound closure   #continue sodium bicarb   # K noted . Consider low k DIET   # ph noted, corrected calcium around 9.2, on renagel CONT  # h/h noted, check iron stores,tx if h <7  #   no acute indication for RRT  # will follow

## 2019-06-13 NOTE — BRIEF OPERATIVE NOTE - OPERATION/FINDINGS
Infected gangrene right foot and ankle.  Right Below knee guillotine amputation.
sharp excisional debridement and washout of right BKA guillotine amputation stump.
Healthy gullitoine stump. Revised BKA with wound closure

## 2019-06-13 NOTE — PROGRESS NOTE ADULT - SUBJECTIVE AND OBJECTIVE BOX
Procedure:s/p right bka   Patient is a 58y old  Male who presents with a chief complaint of rt foot gangrene (12 Jun 2019 11:39)    PAST MEDICAL & SURGICAL HISTORY:  Hypertension  Suprapubic catheter  Pressure ulcer  Diabetes  Lumbar compression fracture  S/P debridement  Toe amputation status, right  H/O hernia repair      Events of the Last 24h: 1 unit prbc given  Vital Signs Last 24 Hrs  T(C): 36 (12 Jun 2019 20:32), Max: 36.3 (12 Jun 2019 13:31)  T(F): 96.8 (12 Jun 2019 20:32), Max: 97.4 (12 Jun 2019 13:31)  HR: 83 (12 Jun 2019 20:32) (77 - 99)  BP: 118/68 (12 Jun 2019 20:32) (108/62 - 118/68)  BP(mean): --  RR: 18 (12 Jun 2019 20:32) (18 - 20)  SpO2: --        Diet, Regular:   No Concentrated Potassium (06-12-19 @ 06:17)  Diet, NPO after Midnight:      NPO Start Date: 12-Jun-2019,   NPO Start Time: 23:59  Except Medications (06-12-19 @ 11:26)      I&O's Summary    11 Jun 2019 07:01  -  12 Jun 2019 07:00  --------------------------------------------------------  IN: 0 mL / OUT: 2075 mL / NET: -2075 mL    12 Jun 2019 07:01  -  13 Jun 2019 00:53  --------------------------------------------------------  IN: 0 mL / OUT: 950 mL / NET: -950 mL     I&O's Detail    11 Jun 2019 07:01  -  12 Jun 2019 07:00  --------------------------------------------------------  IN:  Total IN: 0 mL    OUT:    Indwelling Catheter - Suprapubic: 2075 mL  Total OUT: 2075 mL    Total NET: -2075 mL      12 Jun 2019 07:01  -  13 Jun 2019 00:53  --------------------------------------------------------  IN:  Total IN: 0 mL    OUT:    Indwelling Catheter - Suprapubic: 950 mL  Total OUT: 950 mL    Total NET: -950 mL          MEDICATIONS  (STANDING):  ALPRAZolam 2 milliGRAM(s) Oral at bedtime  cefTRIAXone   IVPB 2 Gram(s) IV Intermittent every 24 hours  chlorhexidine 4% Liquid 1 Application(s) Topical <User Schedule>  Dakins Solution - Full Strength 1 Application(s) Topical every 12 hours  dextrose 5% + sodium chloride 0.45%. 1000 milliLiter(s) (50 mL/Hr) IV Continuous <Continuous>  diazepam    Tablet 10 milliGRAM(s) Oral two times a day  docusate sodium 100 milliGRAM(s) Oral three times a day  dronabinol 2.5 milliGRAM(s) Oral daily  heparin  Injectable 5000 Unit(s) SubCutaneous every 8 hours  insulin lispro (HumaLOG) corrective regimen sliding scale   SubCutaneous three times a day before meals  methadone    Tablet 10 milliGRAM(s) Oral daily  metroNIDAZOLE  IVPB 500 milliGRAM(s) IV Intermittent every 8 hours  oxybutynin 5 milliGRAM(s) Oral daily  pantoprazole    Tablet 40 milliGRAM(s) Oral before breakfast  phytonadione  IVPB 5 milliGRAM(s) IV Intermittent once  sevelamer carbonate 800 milliGRAM(s) Oral three times a day with meals  sodium bicarbonate 650 milliGRAM(s) Oral three times a day    MEDICATIONS  (PRN):  morphine  - Injectable 2 milliGRAM(s) IV Push every 2 hours PRN Severe Pain (7 - 10)  naloxone Injectable 0.4 milliGRAM(s) IV Push once PRN OVERDOSE, DO NOT GIVE WITHOUT PROVIDER APPROVAL  oxyCODONE    IR 30 milliGRAM(s) Oral every 12 hours PRN Moderate Pain (4 - 6)  senna 2 Tablet(s) Oral daily PRN Constipation      PHYSICAL EXAM:    GENERAL: NAD    HEENT: NCAT    CHEST/LUNGS: CTAB    HEART: RRR,  No murmurs, rubs, or gallops    ABDOMEN: SNTND +BS    EXTREMITIES:  FROM, No clubbing, cyanosis, or edema, palpable pulse    NEURO: No focal neurological deficits    SKIN: No rashes or lesions    INCISION/WOUNDS:                          8.7    9.13  )-----------( 328      ( 12 Jun 2019 22:00 )             28.5        CBC Full  -  ( 12 Jun 2019 22:00 )  WBC Count : 9.13 K/uL  RBC Count : 3.45 M/uL  Hemoglobin : 8.7 g/dL  Hematocrit : 28.5 %  Platelet Count - Automated : 328 K/uL  Mean Cell Volume : 82.6 fL  Mean Cell Hemoglobin : 25.2 pg  Mean Cell Hemoglobin Concentration : 30.5 g/dL  Auto Neutrophil # : x  Auto Lymphocyte # : x  Auto Monocyte # : x  Auto Eosinophil # : x  Auto Basophil # : x  Auto Neutrophil % : x  Auto Lymphocyte % : x  Auto Monocyte % : x  Auto Eosinophil % : x  Auto Basophil % : x               137   |  110   |  40                 Ca: 7.3    BMP:   ----------------------------< 94     Mg: x     (06-12-19 @ 22:00)             5.1    |  13    | 4.0                Ph: x        LFT:     TPro: 4.8 / Alb: 1.3 / TBili: <0.2 / DBili: 0.2 / AST: 12 / ALT: 9 / AlkPhos: 189   (06-06-19 @ 10:13)      PT/INR - ( 12 Jun 2019 22:00 )   PT: 23.80 sec;   INR: 2.08 ratio         PTT - ( 12 Jun 2019 22:00 )  PTT:56.1 sec

## 2019-06-13 NOTE — PRE-ANESTHESIA EVALUATION ADULT - NSANTHOSAYNRD_GEN_A_CORE
see screening tool/No. JORDAN screening performed.  STOP BANG Legend: 0-2 = LOW Risk; 3-4 = INTERMEDIATE Risk; 5-8 = HIGH Risk

## 2019-06-13 NOTE — CHART NOTE - NSCHARTNOTEFT_GEN_A_CORE
Post Operative Note  Patient: MARGE BELLA 58y (1960) Male   MRN: [id number]  Location: [address]  Visit: 06-04-19 Inpatient  Date: 06-13-19 @ 22:24    Procedure: S/P R BKA closure    Subjective:   Nausea: no, Vomiting: no, Ambulating: no, Flatus: no  Pain Assessment: yes, Location: RLE, Pain Intensity: 7/10 , Quality: Aching     Objective:  Vitals: T(F): 97.2 (06-13-19 @ 18:45), Max: 97.5 (06-13-19 @ 13:14)  HR: 93 (06-13-19 @ 18:45)  BP: 120/59 (06-13-19 @ 18:45) (86/60 - 120/59)  RR: 18 (06-13-19 @ 18:45)  SpO2: 98% (06-13-19 @ 14:25)  Vent Settings:     In:   06-12-19 @ 07:01  -  06-13-19 @ 07:00  --------------------------------------------------------  IN: 0 mL    06-13-19 @ 07:01  -  06-13-19 @ 22:24  --------------------------------------------------------  IN: 120 mL      IV Fluids: sodium bicarbonate 650 milliGRAM(s) Oral three times a day      Out:   06-12-19 @ 07:01  -  06-13-19 @ 07:00  --------------------------------------------------------  OUT: 1450 mL    06-13-19 @ 07:01  -  06-13-19 @ 22:24  --------------------------------------------------------  OUT: 30 mL          Voided Urine:   06-12-19 @ 07:01  -  06-13-19 @ 07:00  --------------------------------------------------------  OUT: 1450 mL    06-13-19 @ 07:01  -  06-13-19 @ 22:24  --------------------------------------------------------  OUT: 30 mL      Physical Examination:  General Appearance: NAD  HEENT: EOMI, sclera non-icteric.  Heart: RRR  Lungs: CTABL  Abdomen:  Soft, nontender, nondistended. No rigidity, guarding, or rebound tenderness.   MSK/Extremities: S/p R BKA, otherwise warm & well-perfused, peripheral pulses intact.  Skin: Warm, dry. No jaundice.   Incisions/Wounds: Dressings in place, clean, dry and intact, no signs of infection/active bleeding/drainage    Medications: [Standing]  ALPRAZolam 2 milliGRAM(s) Oral at bedtime  cefTRIAXone   IVPB 2 Gram(s) IV Intermittent every 24 hours  chlorhexidine 4% Liquid 1 Application(s) Topical <User Schedule>  diazepam    Tablet 10 milliGRAM(s) Oral two times a day  docusate sodium 100 milliGRAM(s) Oral three times a day  dronabinol 2.5 milliGRAM(s) Oral daily  heparin  Injectable 5000 Unit(s) SubCutaneous every 8 hours  insulin lispro (HumaLOG) corrective regimen sliding scale   SubCutaneous three times a day before meals  methadone    Tablet 10 milliGRAM(s) Oral daily  metroNIDAZOLE  IVPB 500 milliGRAM(s) IV Intermittent every 8 hours  morphine  - Injectable 2 milliGRAM(s) IV Push every 2 hours PRN  naloxone Injectable 0.4 milliGRAM(s) IV Push once PRN  oxybutynin 5 milliGRAM(s) Oral daily  oxyCODONE    IR 30 milliGRAM(s) Oral every 12 hours PRN  pantoprazole    Tablet 40 milliGRAM(s) Oral before breakfast  senna 2 Tablet(s) Oral daily PRN  sevelamer carbonate 1600 milliGRAM(s) Oral three times a day with meals  sodium bicarbonate 650 milliGRAM(s) Oral three times a day    Medications: [PRN]  ALPRAZolam 2 milliGRAM(s) Oral at bedtime  cefTRIAXone   IVPB 2 Gram(s) IV Intermittent every 24 hours  chlorhexidine 4% Liquid 1 Application(s) Topical <User Schedule>  diazepam    Tablet 10 milliGRAM(s) Oral two times a day  docusate sodium 100 milliGRAM(s) Oral three times a day  dronabinol 2.5 milliGRAM(s) Oral daily  heparin  Injectable 5000 Unit(s) SubCutaneous every 8 hours  insulin lispro (HumaLOG) corrective regimen sliding scale   SubCutaneous three times a day before meals  methadone    Tablet 10 milliGRAM(s) Oral daily  metroNIDAZOLE  IVPB 500 milliGRAM(s) IV Intermittent every 8 hours  morphine  - Injectable 2 milliGRAM(s) IV Push every 2 hours PRN  naloxone Injectable 0.4 milliGRAM(s) IV Push once PRN  oxybutynin 5 milliGRAM(s) Oral daily  oxyCODONE    IR 30 milliGRAM(s) Oral every 12 hours PRN  pantoprazole    Tablet 40 milliGRAM(s) Oral before breakfast  senna 2 Tablet(s) Oral daily PRN  sevelamer carbonate 1600 milliGRAM(s) Oral three times a day with meals  sodium bicarbonate 650 milliGRAM(s) Oral three times a day    Labs:                        9.6    10.08 )-----------( 407      ( 13 Jun 2019 08:15 )             30.9     06-13    139  |  109  |  41<H>  ----------------------------<  106<H>  4.7   |  18  |  3.9<H>    Ca    7.4<L>      13 Jun 2019 08:15  Phos  6.4     06-13  Mg     1.2     06-13      PT/INR - ( 13 Jun 2019 08:15 )   PT: 21.50 sec;   INR: 1.88 ratio         PTT - ( 13 Jun 2019 08:15 )  PTT:44.9 sec      Imaging:  No post-op imaging studies    Assessment:  58yMale patient S/P R BKA closure    Plan:    Abx  IS  Pain control  Reg diet  IVL  Bowel regimen  OOBTC    Date/Time: 06-13-19 @ 22:24

## 2019-06-13 NOTE — BRIEF OPERATIVE NOTE - NSICDXBRIEFPROCEDURE_GEN_ALL_CORE_FT
PROCEDURES:  Amputation below knee 13-Jun-2019 13:03:08  Josh Judge
PROCEDURES:  Irrigation and excisional debridement of tissue down to bone 10-Lionel-2019 11:16:23  Jacob Santanalotsedrick amputation below knee 04-Jun-2019 11:44:09  Jacob Santana
PROCEDURES:  Guillotine amputation below knee 04-Jun-2019 11:44:09  Jacob Santana

## 2019-06-13 NOTE — CHART NOTE - NSCHARTNOTEFT_GEN_A_CORE
[facility] ANESTHESIA ADMISSION NOTE      Procedure: Amputation below knee  Irrigation and excisional debridement of tissue down to bone  Guillotine amputation below knee    Post op diagnosis:  S/P below knee amputation, right  Gangrene of right foot      ____  Intubated  TV:______       Rate: ______      FiO2: ______    _x___  Patent Airway    _x___  Full return of protective reflexes    _x___  Full recovery from anesthesia / back to baseline status    Vitals:    See anesthesia record      Mental Status:  _x___ Awake   _____ Alert   _____ Drowsy   _____ Sedated    Nausea/Vomiting:  _x___  NO       ______Yes,   See Post - Op Orders         Pain Scale (0-10):  __4___    Treatment: ____ None    _x___ See Post - Op/PCA Orders    Post - Operative Fluids:   __x__ Oral   ____ See Post - Op Orders    Plan: Discharge:   ____Home       __x___Floor     _____Critical Care    _____  Other:_________________    Comments:  No anesthesia issues or complications noted.  Discharge when criteria met.

## 2019-06-13 NOTE — BRIEF OPERATIVE NOTE - NSICDXBRIEFPREOP_GEN_ALL_CORE_FT
PRE-OP DIAGNOSIS:  Gangrene of right foot 04-Jun-2019 11:44:33  Jacob Santana
PRE-OP DIAGNOSIS:  S/P below knee amputation, right 10-Lionel-2019 11:16:41  Jacob Santana  Gangrene of right foot 04-Jun-2019 11:44:33  Jacob Santana
PRE-OP DIAGNOSIS:  Gangrene of right foot 04-Jun-2019 11:44:33  Jacob Santana

## 2019-06-13 NOTE — CHART NOTE - NSCHARTNOTEFT_GEN_A_CORE
Registered Dietitian Follow-Up     Patient Profile Reviewed                           Yes [x]   No []    Nutrition History Previously Obtained        Yes [x]  No []       Pertinent Subjective Information: RD obtained nutrition info from RN and PCA. Reports pt is eating very well for last few days- 100% at most meals.     Pertinent Medical Interventions: Pt is in OR today for revision of BKA/washout.  (1) hx of umbar fx at age 24, resulting paraplegia wheelchair bound w/ pressure ulcers s/p debs.  (2) Vascular sx f/u s/p R guillotine BKA 6/4.  (4) SILVINA, Cr stable- renal following     Diet order: NPO for procedure // regular, no conc. K    Anthropometrics:  - Ht.  185.42cm  - Wt. (6/13) 70.8kg   (6/8): 78.2kg   (admitted): 72.9 kg  --will continue to monitor for actual wt loss vs bed scale error  - BMI  - IBW 84kg+/-10%     Pertinent Lab Data: (6/13) BUN 41, Cr 3.9, s gluc 106, Mg 1.2     Pertinent Meds: heparin, IV abx, flagyl, lactated ringers 120ml/h, humalog,    colace, marinol, magnesium sulfate, demerol, morphine, zofran, senna, protonix, renvela    Physical Findings:  - Appearance: alert and oriented, no edema noted  - GI function: last BM per RN 6/13, previously constipated  - Tubes:  - Oral/Mouth cavity: no changes in chew/swallow ability  - Skin: DFU, heel/wound, b/l stage 4 to ischial, stage 4 to sacrum, and coccyx.     Nutrition Requirements  Weight Used:  72.9 kg admitted     ESTIMATED ENERGY NEEDS:         2190 kcals/day (30 kcals/kg ABW) - multiple PU, paraplegic  ESTIMATED PROTEIN NEEDS:        73-87 /day (1-1.2 g/kg ABW)- wounds and sepsis noted, however renal fx poor at this time  ESTIMATED FLUID NEEDS:             1ml/kcal or per renal     Nutrient Intake: NPO for procedure, however, pt previously eating 100% for last few days        [] Previous Nutrition Diagnosis: inadequate oral intake            [] Ongoing          [x] Resolved    [] No active nutrition diagnosis identified at this time     Nutrition Intervention meals and snacks  Adjust diet to CHO consistent renal restricted diet modifier. Provides appropriate estimated protein- stage III's and renal fxn noted   Noted pt on marinol- continue    Goal/Expected Outcome: Pt to maintain PO intake >75% of meal trays within 7 days     Indicator/Monitoring: RD to monitor diet order, energy intake, NFPF, body comp, glucose and renal profile    No longer considered at risk f/u 7 days

## 2019-06-13 NOTE — MEDICAL STUDENT PROGRESS NOTE(EDUCATION) - SUBJECTIVE AND OBJECTIVE BOX
Patient is a 57 yo male with pmh of lumbar compression fracture at the age of 24, resulting in parapalegia/ wheelchair bound with subsequent sacral decubiti and heel ulcers s/p debridements in the past and currently(managed by Dr. 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  Today is hospital day 9 and POD #9. Patient s/p revision yesterday.     Events over 24h: The patient was seen and examined and reports he is having constipation. Made small BM today. He reports he was eating and drinking, but now NPO, OR for washout.  PT voiding via catheter, wound dressing is dry, clean, and no oozing, blood, or pus observed. Last night, pt given 1 unit of PRBC. Refused colace, and sodium bicarb per nurse. No other events.     Events of the Last 24h: 1 unit prbc given  Vital Signs Last 24 Hrs  T(C): 36 (12 Jun 2019 20:32), Max: 36.3 (12 Jun 2019 13:31)  T(F): 96.8 (12 Jun 2019 20:32), Max: 97.4 (12 Jun 2019 13:31)  HR: 83 (12 Jun 2019 20:32) (77 - 99)  BP: 118/68 (12 Jun 2019 20:32) (108/62 - 118/68)  BP(mean): --  RR: 18 (12 Jun 2019 20:32) (18 - 20)  SpO2: --        Diet, Regular:   No Concentrated Potassium (06-12-19 @ 06:17)  Diet, NPO after Midnight:      NPO Start Date: 12-Jun-2019,   NPO Start Time: 23:59  Except Medications (06-12-19 @ 11:26)      I&O's Summary    11 Jun 2019 07:01  -  12 Jun 2019 07:00  --------------------------------------------------------  IN: 0 mL / OUT: 2075 mL / NET: -2075 mL    12 Jun 2019 07:01  -  13 Jun 2019 00:53  --------------------------------------------------------  IN: 0 mL / OUT: 950 mL / NET: -950 mL     I&O's Detail    11 Jun 2019 07:01  -  12 Jun 2019 07:00  --------------------------------------------------------  IN:  Total IN: 0 mL    OUT:    Indwelling Catheter - Suprapubic: 2075 mL  Total OUT: 2075 mL    Total NET: -2075 mL      12 Jun 2019 07:01  -  13 Jun 2019 00:53  --------------------------------------------------------  IN:  Total IN: 0 mL    OUT:    Indwelling Catheter - Suprapubic: 950 mL  Total OUT: 950 mL    Total NET: -950 mL          MEDICATIONS  (STANDING):  ALPRAZolam 2 milliGRAM(s) Oral at bedtime  cefTRIAXone   IVPB 2 Gram(s) IV Intermittent every 24 hours  chlorhexidine 4% Liquid 1 Application(s) Topical <User Schedule>  Dakins Solution - Full Strength 1 Application(s) Topical every 12 hours  dextrose 5% + sodium chloride 0.45%. 1000 milliLiter(s) (50 mL/Hr) IV Continuous <Continuous>  diazepam    Tablet 10 milliGRAM(s) Oral two times a day  docusate sodium 100 milliGRAM(s) Oral three times a day  dronabinol 2.5 milliGRAM(s) Oral daily  heparin  Injectable 5000 Unit(s) SubCutaneous every 8 hours  insulin lispro (HumaLOG) corrective regimen sliding scale   SubCutaneous three times a day before meals  methadone    Tablet 10 milliGRAM(s) Oral daily  metroNIDAZOLE  IVPB 500 milliGRAM(s) IV Intermittent every 8 hours  oxybutynin 5 milliGRAM(s) Oral daily  pantoprazole    Tablet 40 milliGRAM(s) Oral before breakfast  phytonadione  IVPB 5 milliGRAM(s) IV Intermittent once  sevelamer carbonate 800 milliGRAM(s) Oral three times a day with meals  sodium bicarbonate 650 milliGRAM(s) Oral three times a day    MEDICATIONS  (PRN):  morphine  - Injectable 2 milliGRAM(s) IV Push every 2 hours PRN Severe Pain (7 - 10)  naloxone Injectable 0.4 milliGRAM(s) IV Push once PRN OVERDOSE, DO NOT GIVE WITHOUT PROVIDER APPROVAL  oxyCODONE    IR 30 milliGRAM(s) Oral every 12 hours PRN Moderate Pain (4 - 6)  senna 2 Tablet(s) Oral daily PRN Constipation      PHYSICAL EXAM:    GENERAL: NAD    HEENT: NCAT    CHEST/LUNGS: CTAB    HEART: RRR,  No murmurs, rubs, or gallops    ABDOMEN: SNTND +BS    EXTREMITIES:  FROM, No clubbing, cyanosis, or edema, palpable pulse    NEURO: No focal neurological deficits    SKIN: No rashes or lesions    INCISION/WOUNDS:                          8.7    9.13  )-----------( 328      ( 12 Jun 2019 22:00 )             28.5        CBC Full  -  ( 12 Jun 2019 22:00 )  WBC Count : 9.13 K/uL  RBC Count : 3.45 M/uL  Hemoglobin : 8.7 g/dL  Hematocrit : 28.5 %  Platelet Count - Automated : 328 K/uL  Mean Cell Volume : 82.6 fL  Mean Cell Hemoglobin : 25.2 pg  Mean Cell Hemoglobin Concentration : 30.5 g/dL  Auto Neutrophil # : x  Auto Lymphocyte # : x  Auto Monocyte # : x  Auto Eosinophil # : x  Auto Basophil # : x  Auto Neutrophil % : x  Auto Lymphocyte % : x  Auto Monocyte % : x  Auto Eosinophil % : x  Auto Basophil % : x               137   |  110   |  40                 Ca: 7.3    BMP:   ----------------------------< 94     Mg: x     (06-12-19 @ 22:00)             5.1    |  13    | 4.0                Ph: x        LFT:     TPro: 4.8 / Alb: 1.3 / TBili: <0.2 / DBili: 0.2 / AST: 12 / ALT: 9 / AlkPhos: 189   (06-06-19 @ 10:13)      PT/INR - ( 12 Jun 2019 22:00 )   PT: 23.80 sec;   INR: 2.08 ratio         PTT - ( 12 Jun 2019 22:00 )  PTT:56.1 sec

## 2019-06-14 NOTE — PROGRESS NOTE ADULT - SUBJECTIVE AND OBJECTIVE BOX
Nephrology progress note    Patient is seen and examined, events over the last 24 h noted .  No complaints    Allergies:  sulfamethizole (Unknown)    Hospital Medications:   MEDICATIONS  (STANDING):  ALPRAZolam 2 milliGRAM(s) Oral at bedtime  chlorhexidine 4% Liquid 1 Application(s) Topical <User Schedule>  diazepam    Tablet 10 milliGRAM(s) Oral two times a day  docusate sodium 100 milliGRAM(s) Oral three times a day  dronabinol 2.5 milliGRAM(s) Oral daily  heparin  Injectable 5000 Unit(s) SubCutaneous every 8 hours  insulin lispro (HumaLOG) corrective regimen sliding scale   SubCutaneous three times a day before meals  methadone    Tablet 10 milliGRAM(s) Oral daily  oxybutynin 5 milliGRAM(s) Oral daily  pantoprazole    Tablet 40 milliGRAM(s) Oral before breakfast  sevelamer carbonate 1600 milliGRAM(s) Oral three times a day with meals  sodium bicarbonate 650 milliGRAM(s) Oral three times a day        VITALS:  T(F): 96.8 (06-14-19 @ 12:35), Max: 97.2 (06-13-19 @ 18:45)  HR: 88 (06-14-19 @ 12:35)  BP: 90/58 (06-14-19 @ 12:35)  RR: 18 (06-14-19 @ 12:35)  SpO2: --  Wt(kg): --    06-12 @ 07:01  -  06-13 @ 07:00  --------------------------------------------------------  IN: 0 mL / OUT: 1450 mL / NET: -1450 mL    06-13 @ 07:01  -  06-14 @ 07:00  --------------------------------------------------------  IN: 120 mL / OUT: 1390 mL / NET: -1270 mL    06-14 @ 07:01  -  06-14 @ 16:25  --------------------------------------------------------  IN: 0 mL / OUT: 400 mL / NET: -400 mL          PHYSICAL EXAM:  Constitutional: NAD  HEENT: anicteric sclera, oropharynx clear, MMM  Neck: No JVD  Respiratory: CTAB, no wheezes, rales or rhonchi  Cardiovascular: S1, S2, RRR  Gastrointestinal: BS+, soft, NT/ND  Extremities: Rt LE brace. No peripheral edema  Neurological: A/O x 3, no focal deficits  : No CVA tenderness. Has a  SPC  Skin: No rashes  Vascular Access:    LABS:  06-14    140  |  112<H>  |  39<H>  ----------------------------<  105<H>  4.7   |  20  |  4.0<H>    Ca    7.4<L>      14 Jun 2019 07:04  Phos  6.4     06-13  Mg     1.2     06-14                            7.7    11.75 )-----------( 383      ( 14 Jun 2019 07:04 )             25.3       Urine Studies:      RADIOLOGY & ADDITIONAL STUDIES:

## 2019-06-14 NOTE — OCCUPATIONAL THERAPY INITIAL EVALUATION ADULT - ADDITIONAL COMMENTS
Pt lives with elderly mother in  with ramp to enter, walk in bathtub with seat, uses w/c as primary mode of ambulation.

## 2019-06-14 NOTE — PHYSICAL THERAPY INITIAL EVALUATION ADULT - PERTINENT HX OF CURRENT PROBLEM, REHAB EVAL
58 M w/ LE weakness 2/2 lumbar compression fracture at the age of 24, resulting in paraplgia/ wheelchair bound (can move LE but very weak, and has some sensation), with subsequent sacral decubiti and heel ulcers s/p debridements in the past,is BIBA c/o foul smell & drainage from his RLE. Pt states that he noticed skin changes, discoloration, drainage, and a foul smell a couple of days ago on his right foot

## 2019-06-14 NOTE — PHYSICAL THERAPY INITIAL EVALUATION ADULT - DID THE PATIENT HAVE SURGERY?
yes/Amputation below knee, Irrigation and excisional debridement of tissue down to bone. Guillotine amputation below knee

## 2019-06-14 NOTE — CONSULT NOTE ADULT - ASSESSMENT
IMPRESSION: Rehab of right bka / paraparesis    PRECAUTIONS: [  ] Cardiac  [  ] Respiratory  [  ] Seizures [  ] Contact Isolation  [  ] Droplet Isolation  [  ] Other    Weight Bearing Status:     RECOMMENDATION:    Out of Bed to Chair     DVT/Decubiti Prophylaxis    REHAB PLAN:     [   ] Bedside P/T 3-5 times a week   [ x  ]   Bedside O/T  2-3 times a week             [   ] No Rehab Therapy Indicated                   [   ]  Speech Therapy   Conditioning/ROM                                    ADL  Bed Mobility                                               Conditioning/ROM  Transfers                                                     Bed Mobility  Sitting /Standing Balance                         Transfers                                        Gait Training                                               Sitting/Standing Balance  Stair Training [   ]Applicable                    Home equipment Eval                                                                        Splinting  [   ] Only      GOALS:   ADL   [ x  ]   Independent                    Transfers  [  x ] Independent                          Ambulation  [   ] Independent     [    ] With device                            [   ]  CG                                                         [   ]  CG                                                                  [   ] CG                            [    ] Min A                                                   [   ] Min A                                                              [   ] Min  A          DISCHARGE PLAN:   [   ]  Good candidate for Intensive Rehabilitation/Hospital based                                             Will tolerate 3hrs Intensive Rehab Daily                                       [  x  ]  Short Term Rehab in Skilled Nursing Facility                         vs              [  x  ]  Home with Outpatient or VN services                                         [    ]  Possible Candidate for Intensive Hospital based Rehab

## 2019-06-14 NOTE — PROGRESS NOTE ADULT - ASSESSMENT
58 M w/ PMH of Paraplegia 2/2 Lumbar compression Fx, sacral decubiti and heel ulcers s/p debridements,, PAD, suprapubic cath,Neuropathy, DM2 & HTN that resolved w/ weight loss, is BIBA c/o foul smell & drainage from his RLE, found to be anemic, in SILVINA, & gangrene of RLE. Renal is consulted for SILVINA. sp right BKA sp revision    s/p  washout yesterday  # severe hypomagnesemia - MAg rider 2gr x2, may start MAg Oxide 400 mg bid tomorrow  # creatinine stable  # non oliguric   # on Rocephin / flagyl  now as per ID until wound closure   #continue sodium bicarb   # K noted . Consider low k DIET   # ph noted, corrected calcium around 9.2, on renagel CONT  # h/h noted, check iron stores,tx if h <7    # will follow

## 2019-06-14 NOTE — CONSULT NOTE ADULT - SUBJECTIVE AND OBJECTIVE BOX
HPI:  58 M w/ LE weakness 2/2 lumbar compression fracture at the age of 24, resulting in paraplgia/ wheelchair bound (can move LE but very weak, and has some sensation), with subsequent sacral decubiti and heel ulcers s/p debridements in the past, PAD, suprapubic cath, Chronic pain, Neuropathy, Insomnia, DM2 & HTN that resolved w/ weight loss, is BIBA c/o foul smell & drainage from his RLE. Pt states that he noticed skin changes, discoloration, drainage, and a foul smell a couple of days ago on his right foot, he usually follow up with dr pradhan for sacrum wound but hasn't seen him for about 4 months. He denies fevers, chills, sob, cp, admits to decreased appetite.   In ED pt had VS stable/wnl/ afebrile.  Labs - Hgb 6.2. Cr 4.9, K 5.2 on VBG, Ph 7.2.  XRAY of Rt foot Prelim - gas gangrene.  Podiatry, Vascular, Burn, & Renal were called.  Pt given - > Zosyn / Vanc / Flagyl / 2L IVF / & 2 U PRBCs (04 Jun 2019 02:29)      PAST MEDICAL & SURGICAL HISTORY:  Hypertension  Suprapubic catheter  Pressure ulcer  Diabetes  Lumbar compression fracture  S/P debridement  Toe amputation status, right  H/O hernia repair      Hospital Course: s/p right BKA / revision    TODAY'S SUBJECTIVE & REVIEW OF SYMPTOMS:     Constitutional WNL   Cardio WNL   Resp WNL   GI WNL  Heme WNL  Endo WNL  Skin WNL  Neuro paraparesis  Cognitive WNL  Psych WNL      MEDICATIONS  (STANDING):  ALPRAZolam 2 milliGRAM(s) Oral at bedtime  chlorhexidine 4% Liquid 1 Application(s) Topical <User Schedule>  diazepam    Tablet 10 milliGRAM(s) Oral two times a day  docusate sodium 100 milliGRAM(s) Oral three times a day  dronabinol 2.5 milliGRAM(s) Oral daily  heparin  Injectable 5000 Unit(s) SubCutaneous every 8 hours  insulin lispro (HumaLOG) corrective regimen sliding scale   SubCutaneous three times a day before meals  methadone    Tablet 10 milliGRAM(s) Oral daily  oxybutynin 5 milliGRAM(s) Oral daily  pantoprazole    Tablet 40 milliGRAM(s) Oral before breakfast  sevelamer carbonate 1600 milliGRAM(s) Oral three times a day with meals  sodium bicarbonate 650 milliGRAM(s) Oral three times a day    MEDICATIONS  (PRN):  morphine  - Injectable 2 milliGRAM(s) IV Push every 2 hours PRN Severe Pain (7 - 10)  naloxone Injectable 0.4 milliGRAM(s) IV Push once PRN OVERDOSE, DO NOT GIVE WITHOUT PROVIDER APPROVAL  oxyCODONE    IR 30 milliGRAM(s) Oral every 12 hours PRN Moderate Pain (4 - 6)  senna 2 Tablet(s) Oral daily PRN Constipation      FAMILY HISTORY:      Allergies    sulfamethizole (Unknown)    Intolerances        SOCIAL HISTORY:    [  ] Etoh  [  ] Smoking  [  ] Substance abuse     Home Environment:  [  ] Home Alone  [ x ] Lives with Family  [  ] Home Health Aid    Dwelling:  [  ] Apartment  [ x ] Private House  [  ] Adult Home  [  ] Skilled Nursing Facility      [  ] Short Term  [  ] Long Term  [  ] Stairs       Elevator [  ]    FUNCTIONAL STATUS PTA: (Check all that apply)  Ambulation: [   ]Independent    [  ] Dependent     [ x ] Non-Ambulatory  Assistive Device: [  ] SA Cane  [  ]  Q Cane  [  ] Walker  [ x ]  Wheelchair  ADL : [  ] Independent  [  ]  Dependent       Vital Signs Last 24 Hrs  T(C): 36 (14 Jun 2019 12:35), Max: 36.4 (13 Jun 2019 14:18)  T(F): 96.8 (14 Jun 2019 12:35), Max: 97.5 (13 Jun 2019 14:18)  HR: 88 (14 Jun 2019 12:35) (78 - 93)  BP: 90/58 (14 Jun 2019 12:35) (90/58 - 120/59)  BP(mean): --  RR: 18 (14 Jun 2019 12:35) (12 - 20)  SpO2: 98% (13 Jun 2019 14:25) (97% - 99%)      PHYSICAL EXAM: Alert & Oriented X3  GENERAL: NAD, well-groomed, well-developed  HEAD:  Atraumatic, Normocephalic  CHEST/LUNG: Clear   HEART: S1S2+  ABDOMEN: Soft, Nontender  EXTREMITIES:  right bka    NERVOUS SYSTEM:  Cranial Nerves 2-12 intact [  ] Abnormal  [  ]  ROM: WFL all extremities [  ]  Abnormal [  ]limited miriam le  Motor Strength: WFL all extremities  [  ]  Abnormal [  ]limited miriam le  Sensation: intact to light touch [  ] Abnormal [  ]  Reflexes: Symmetric [  ]  Abnormal [  ]    FUNCTIONAL STATUS:  Bed Mobility: Independent [  ]  Supervision [  ]  Needs Assistance [x  ]  N/A [  ]  Transfers: Independent [  ]  Supervision [  ]  Needs Assistance [x  ]  N/A [  ]   Ambulation: Independent [  ]  Supervision [  ]  Needs Assistance [  ]  N/A [  ]  ADL: Independent [  ] Requires Assistance [  ] N/A [  ]      LABS:                        7.7    11.75 )-----------( 383      ( 14 Jun 2019 07:04 )             25.3     06-14    140  |  112<H>  |  39<H>  ----------------------------<  105<H>  4.7   |  20  |  4.0<H>    Ca    7.4<L>      14 Jun 2019 07:04  Phos  6.4     06-13  Mg     1.2     06-14      PT/INR - ( 13 Jun 2019 08:15 )   PT: 21.50 sec;   INR: 1.88 ratio         PTT - ( 13 Jun 2019 08:15 )  PTT:44.9 sec      RADIOLOGY & ADDITIONAL STUDIES:    Assesment:

## 2019-06-14 NOTE — OCCUPATIONAL THERAPY INITIAL EVALUATION ADULT - GENERAL OBSERVATIONS, REHAB EVAL
[phone]; Pt received supine in bed, +IV lock, +suprapubiter catheter, +bandaging to right residual limb, in NAD. Pt agreeable to OT tx session.

## 2019-06-14 NOTE — PHYSICAL THERAPY INITIAL EVALUATION ADULT - GENERAL OBSERVATIONS, REHAB EVAL
16:45-17:15, chart reviewed. Pt received semi-miller at B/S, alert, oriented, able to follow multi-step instructions and agreeable to PT evaluation., Mother at /S, + IV, hgb: was 9.6 now 7.7, Pt is asymptomatic denies any dizziness, BP: 108/58, + stump drain, + knee immobilizer, + suprapubic Cath,  c/o of R stump pain 8.5/10. Pt is old paraplegic Pt has his own W/C which is not safe, has no breaks, no Anti-tippers, refuse to use a regular Wheel chair. Pt was educated about safety precautions.

## 2019-06-14 NOTE — PROGRESS NOTE ADULT - SUBJECTIVE AND OBJECTIVE BOX
GENERAL SURGERY PROGRESS NOTE     MARGE BELLA  58y  Male  Hospital day :10d  POD: 1  Procedure: Amputation below knee  Irrigation and excisional debridement of tissue down to bone  [facility] amputation below knee    OVERNIGHT EVENTS:  No acute events overnight. Pain at stump closure site. dressing in place c/d/i     T(F): 96.3 (06-14-19 @ 05:22), Max: 97.5 (06-13-19 @ 13:14)  HR: 82 (06-14-19 @ 05:22) (78 - 93)  BP: 107/77 (06-14-19 @ 05:22) (86/60 - 120/59)  RR: 18 (06-14-19 @ 05:22) (6 - 20)  SpO2: 98% (06-13-19 @ 14:25) (97% - 100%)    DIET/FLUIDS: sodium bicarbonate 650 milliGRAM(s) Oral three times a day       GI proph:  pantoprazole    Tablet 40 milliGRAM(s) Oral before breakfast    AC/ proph: heparin  Injectable 5000 Unit(s) SubCutaneous every 8 hours    ABx: cefTRIAXone   IVPB 2 Gram(s) IV Intermittent every 24 hours  metroNIDAZOLE  IVPB 500 milliGRAM(s) IV Intermittent every 8 hours      PHYSICAL EXAM:  GENERAL: NAD, well-appearing  CHEST/LUNG: no obvious increased wob   EXTREMITIES: Dressings in place, clean, dry and intact, no signs of infection/active bleeding/drainage        LABS    POCT Blood Glucose.: 168 mg/dL (13 Jun 2019 22:21)  POCT Blood Glucose.: 139 mg/dL (13 Jun 2019 16:18)  POCT Blood Glucose.: 99 mg/dL (13 Jun 2019 07:43)                          9.6    10.08 )-----------( 407      ( 13 Jun 2019 08:15 )             30.9       Auto Neutrophil %: 78.6 % (06-13-19 @ 08:15)  Auto Immature Granulocyte %: 0.4 % (06-13-19 @ 08:15)    06-13    139  |  109  |  41<H>  ----------------------------<  106<H>  4.7   |  18  |  3.9<H>      Calcium, Total Serum: 7.4 mg/dL (06-13-19 @ 08:15)      LFTs:         Coags:     21.50  ----< 1.88    ( 13 Jun 2019 08:15 )     44.9

## 2019-06-14 NOTE — PROGRESS NOTE ADULT - ASSESSMENT
POD 1 s/p closure R BKA     PLAN:     - D/C antibiotics     - dressing change 6/15     - PT/OT evaluation.      - pain control

## 2019-06-16 NOTE — PROGRESS NOTE ADULT - SUBJECTIVE AND OBJECTIVE BOX
Nephrology progress note    Patient was seen and examined, events over the last 24 h noted .  Cr stable yesterday    Allergies:  sulfamethizole (Unknown)    Hospital Medications:   MEDICATIONS  (STANDING):  ALPRAZolam 2 milliGRAM(s) Oral at bedtime  chlorhexidine 4% Liquid 1 Application(s) Topical <User Schedule>  diazepam    Tablet 10 milliGRAM(s) Oral two times a day  docusate sodium 100 milliGRAM(s) Oral three times a day  dronabinol 2.5 milliGRAM(s) Oral daily  heparin  Injectable 5000 Unit(s) SubCutaneous every 8 hours  insulin lispro (HumaLOG) corrective regimen sliding scale   SubCutaneous three times a day before meals  methadone    Tablet 10 milliGRAM(s) Oral daily  oxybutynin 5 milliGRAM(s) Oral daily  pantoprazole    Tablet 40 milliGRAM(s) Oral before breakfast  sevelamer carbonate 1600 milliGRAM(s) Oral three times a day with meals  sodium bicarbonate 650 milliGRAM(s) Oral three times a day        VITALS:  T(F): 96.6 (06-16-19 @ 04:30), Max: 98.6 (06-15-19 @ 12:29)  HR: 80 (06-16-19 @ 04:30)  BP: 108/59 (06-16-19 @ 04:30)  RR: 18 (06-16-19 @ 04:30)  SpO2: 99% (06-15-19 @ 16:02)  Wt(kg): --    06-14 @ 07:01  -  06-15 @ 07:00  --------------------------------------------------------  IN: 0 mL / OUT: 1020 mL / NET: -1020 mL    06-15 @ 07:01  -  06-16 @ 07:00  --------------------------------------------------------  IN: 0 mL / OUT: 1600 mL / NET: -1600 mL          PHYSICAL EXAM:  Constitutional: NAD  HEENT: anicteric sclera, oropharynx clear, MMM  Neck: No JVD  Respiratory: CTAB, no wheezes, rales or rhonchi  Cardiovascular: S1, S2, RRR  Gastrointestinal: BS+, soft, NT/ND  Extremities: Rt LE brace. No peripheral edema  Neurological: A/O x 3, no focal deficits  : No CVA tenderness. Has a  SPC  Skin: No rashes  Vascular Access:      LABS:  06-15    144  |  112<H>  |  38<H>  ----------------------------<  87  4.4   |  19  |  3.8<H>    Ca    7.8<L>      15 Lionel 2019 07:45  Phos  5.8     06-15  Mg     1.8     06-15                            7.5    8.96  )-----------( 369      ( 15 Lionel 2019 07:45 )             23.9       Urine Studies:      RADIOLOGY & ADDITIONAL STUDIES:

## 2019-06-16 NOTE — PROGRESS NOTE ADULT - ASSESSMENT
58 M w/ PMH of Paraplegia 2/2 Lumbar compression Fx, sacral decubiti and heel ulcers s/p debridements,, PAD, suprapubic cath,Neuropathy, DM2 & HTN that resolved w/ weight loss, is BIBA c/o foul smell & drainage from his RLE, found to be anemic, in SILVINA, & gangrene of RLE. Renal is consulted for SILVINA. sp right BKA sp revision    s/p  washout friday  # severe hypomagnesemia - MAg rider 2gr x2,  start MAg Oxide 400 mg bid   # creatinine stable  # non oliguric   # on Rocephin / flagyl  now as per ID until wound closure   #continue sodium bicarb   # K noted . Consider low k DIET   # ph noted, corrected calcium around 9.2, on renagel CONT  # h/h noted, check iron stores,tx if h <7    # will follow

## 2019-06-16 NOTE — PROGRESS NOTE ADULT - ASSESSMENT
Assessment:  58y Male patient admitted S/P revised BKA with wound closure     Plan:  ·	CHILANGO removed  ·	Dressing changed Saturday  ·	Diet regular  ·	Pain control  ·	Encourage incentive spirometer use

## 2019-06-16 NOTE — PROGRESS NOTE ADULT - SUBJECTIVE AND OBJECTIVE BOX
Patient: MARGE BELLA , 58y (1960)Male     Procedure/Diagnosis: S/p revised BKA with wound closure   Events over 24h: No acute overnight events, patient seen and examined bedside with no active complaints. Dressing changed, CHILANGO removed    Vitals: T(F): 96.5 (06-15-19 @ 20:30), Max: 98.6 (06-15-19 @ 12:29)  HR: 85 (06-15-19 @ 20:30)  BP: 113/59 (06-15-19 @ 20:30) (102/56 - 113/59)  RR: 19 (06-15-19 @ 20:30)  SpO2: 99% (06-15-19 @ 16:02)    In:   06-14-19 @ 07:01  -  06-15-19 @ 07:00  --------------------------------------------------------  IN: 0 mL    06-15-19 @ 07:01  -  06-16-19 @ 02:14  --------------------------------------------------------  IN: 0 mL    Out:   06-14-19 @ 07:01  -  06-15-19 @ 07:00  --------------------------------------------------------  OUT:    Bulb: 20 mL    Indwelling Catheter - Suprapubic: 1000 mL  Total OUT: 1020 mL      06-15-19 @ 07:01  -  06-16-19 @ 02:14  --------------------------------------------------------  OUT:    Indwelling Catheter - Suprapubic: 1150 mL  Total OUT: 1150 mL    Net:   06-14-19 @ 07:01  -  06-15-19 @ 07:00  --------------------------------------------------------  NET: -1020 mL    06-15-19 @ 07:01  -  06-16-19 @ 02:14  --------------------------------------------------------  NET: -1150 mL      Diet: Diet, Regular:   No Concentrated Potassium (06-13-19 @ 13:13)    IV Fluids: Type: sodium bicarbonate 650 milliGRAM(s) Oral three times a day    Physical Examination:  General Appearance: NAD, alert and cooperative  HEENT: NCAT, WNL  Heart: S1 and S2. No murmurs. RRR  Lungs: CTABL  Abdomen: Soft, nondistended, nontender  Extremities: Right BKA, stump incision healing well, staples in place, CHILANGO removed    Medications: [Standing]  ALPRAZolam 2 milliGRAM(s) Oral at bedtime  chlorhexidine 4% Liquid 1 Application(s) Topical <User Schedule>  diazepam    Tablet 10 milliGRAM(s) Oral two times a day  docusate sodium 100 milliGRAM(s) Oral three times a day  dronabinol 2.5 milliGRAM(s) Oral daily  heparin  Injectable 5000 Unit(s) SubCutaneous every 8 hours  insulin lispro (HumaLOG) corrective regimen sliding scale   SubCutaneous three times a day before meals  methadone    Tablet 10 milliGRAM(s) Oral daily  oxybutynin 5 milliGRAM(s) Oral daily  pantoprazole    Tablet 40 milliGRAM(s) Oral before breakfast  sevelamer carbonate 1600 milliGRAM(s) Oral three times a day with meals  sodium bicarbonate 650 milliGRAM(s) Oral three times a day    DVT Prophylaxis: heparin  Injectable 5000 Unit(s) SubCutaneous every 8 hours  GI Prophylaxis: pantoprazole    Tablet 40 milliGRAM(s) Oral before breakfast    Antibiotics:   Anticoagulation:   Medications:[PRN]  morphine  - Injectable 2 milliGRAM(s) IV Push every 6 hours PRN  naloxone Injectable 0.4 milliGRAM(s) IV Push once PRN  oxyCODONE    IR 30 milliGRAM(s) Oral every 12 hours PRN  senna 2 Tablet(s) Oral daily PRN    Labs:                        7.5    8.96  )-----------( 369      ( 15 Lionel 2019 07:45 )             23.9     06-15    144  |  112<H>  |  38<H>  ----------------------------<  87  4.4   |  19  |  3.8<H>    Ca    7.8<L>      15 Lionel 2019 07:45  Phos  5.8     06-15  Mg     1.8     06-15

## 2019-06-17 NOTE — PROGRESS NOTE ADULT - ATTENDING COMMENTS
seen with fellow. SILVINA. pt is alert, no indication for RRT at this point. will follow
n: alert oriented, pain on home regimen  p: room air no dx  c: hypotension, initial improvement with fluids and blood, but downtrending again  gi: dm diet  gu: [place] lateral, continue resuscitationuy, likely intrarenal  h: anemia likely acute blood loss and chronic disease  trend hb  transfuse if still symptomatic even if above 7  id: ID following for soft tissue infection s/p R BKA   wbc normalized  e: dm2: no home regimen, on iss controlled  ms: awaiting final plan for revision  oobtc and pivot     provided over 30 minutes of direct critical care to this patient
n: multiple meds, held valium overnight and bp remaind in appropriate range  p: no dx  c: hypotension resolved, was PM and yesterday held valium and did not have pm drop  gi: po diet, bowel function, ppi  gu: superpub leaking, gu for assessment  h: 7.7 stable  id: [id number], plan to return to or monday  possible phlebitis in PIV but hard stick, will attempt midline  e: hypoglycemia, given juice and dextrose and resolved  ms: marisabel, awaiting revision    plan for downgrade today
n: pain home methadone and oxy and morphine  chronic pain on home meds  anxiety on home valium and xanax    p: no acute dx    c: hypotension likely medication related with no hypotension overnight, ntntc    gi: ccd, ppi    gu: [place], creatinine 4.0 slightly downtrending  spc in place    h: 7.9 from 7.8 from 7.0 now stable   inr elevated 1.76, will obtain lfts     id: soft tissue infection s/p bk with initial downtrend leukocytosis to 9, now 12, possible failure of source control vs alternative infection such as decub  vascular dressing need eval today  simón clinda vanc  stage 4 sac decub folowed by carolynn    e: dm2 on iss  hypoglycemia, 67, will encourage diet    ms: marisabel w/ dressing in place  oob with assistance    downgrade

## 2019-06-17 NOTE — PROGRESS NOTE ADULT - SUBJECTIVE AND OBJECTIVE BOX
HPI: 58y Male  HPI:  58 M w/ LE weakness 2/2 lumbar compression fracture at the age of 24, resulting in paraplgia/ wheelchair bound (can move LE but very weak, and has some sensation), with subsequent sacral decubiti and heel ulcers s/p debridements in the past, PAD, suprapubic cath, Chronic pain, Neuropathy, Insomnia, DM2 & HTN that resolved w/ weight loss, is BIBA c/o foul smell & drainage from his RLE. Pt states that he noticed skin changes, discoloration, drainage, and a foul smell a couple of days ago on his right foot, he usually follow up with dr pradhan for sacrum wound but hasn't seen him for about 4 months. He denies fevers, chills, sob, cp, admits to decreased appetite.   In ED pt had VS stable/wnl/ afebrile.  Labs - Hgb 6.2. Cr 4.9, K 5.2 on VBG, Ph 7.2.  XRAY of Rt foot Prelim - gas gangrene.  Podiatry, Vascular, Burn, & Renal were called.  Pt given - > Zosyn / Vanc / Flagyl / 2L IVF / & 2 U PRBCs (04 Jun 2019 02:29)    Procedure/Diagnosis: S/p revised BKA with wound closure   Events over 24h: No acute overnight events, patient seen and examined bedside with no active complaints. Dressing changed    PAST MEDICAL & SURGICAL HISTORY:  Hypertension  Suprapubic catheter  Pressure ulcer  Diabetes  Lumbar compression fracture  S/P debridement  Toe amputation status, right  H/O hernia repair    Home Meds: Home Medications:  gabapentin 800 mg oral tablet: 1 tab(s) orally 3 times a day (27 Jul 2018 01:14)  methadone: 10 milligram(s) orally once a day (04 Jun 2019 02:46)  oxyCODONE 30 mg oral tablet: 1 tab(s) orally 4 times a day (03 Aug 2018 14:30)  Testim 1% (50 mg/5 g) transdermal gel: 1 packet(s) transdermal once a day (in the morning) (27 Jul 2018 01:14)  Valium 10 mg oral tablet: orally 2 times a day (27 Jul 2018 01:14)  Xanax 2 mg oral tablet: orally once (at bedtime) (27 Jul 2018 01:14)    Allergies: Allergies    sulfamethizole (Unknown)    Intolerances      Soc:   Advanced Directives: Presumed Full Code     ROS:    REVIEW OF SYSTEMS    [ ] A ten-point review of systems was otherwise negative except as noted.  [ ] Due to altered mental status/intubation, subjective information were not able to be obtained from the patient. History was obtained, to the extent possible, from review of the chart and collateral sources of information.      CURRENT MEDICATIONS:   --------------------------------------------------------------------------------------  Neurologic Medications  ALPRAZolam 2 milliGRAM(s) Oral at bedtime  diazepam    Tablet 10 milliGRAM(s) Oral two times a day  dronabinol 2.5 milliGRAM(s) Oral daily  methadone    Tablet 10 milliGRAM(s) Oral daily  morphine  - Injectable 2 milliGRAM(s) IV Push every 6 hours PRN severe breakthrough pain, only AFTER oral analgesics tried  oxyCODONE    IR 30 milliGRAM(s) Oral every 12 hours PRN Moderate Pain (4 - 6)    Respiratory Medications    Cardiovascular Medications    Gastrointestinal Medications  docusate sodium 100 milliGRAM(s) Oral three times a day  magnesium oxide 400 milliGRAM(s) Oral two times a day with meals  pantoprazole    Tablet 40 milliGRAM(s) Oral before breakfast  senna 2 Tablet(s) Oral daily PRN Constipation  sodium bicarbonate 650 milliGRAM(s) Oral three times a day    Genitourinary Medications  oxybutynin 5 milliGRAM(s) Oral daily    Hematologic/Oncologic Medications  heparin  Injectable 5000 Unit(s) SubCutaneous every 8 hours    Antimicrobial/Immunologic Medications    Endocrine/Metabolic Medications  insulin lispro (HumaLOG) corrective regimen sliding scale   SubCutaneous three times a day before meals    Topical/Other Medications  chlorhexidine 4% Liquid 1 Application(s) Topical <User Schedule>  Dakins Solution - 1/2 Strength 1 Application(s) Topical two times a day  naloxone Injectable 0.4 milliGRAM(s) IV Push once PRN OVERDOSE, DO NOT GIVE WITHOUT PROVIDER APPROVAL  sevelamer carbonate 1600 milliGRAM(s) Oral three times a day with meals    --------------------------------------------------------------------------------------    VITAL SIGNS, INS/OUTS (last 24 hours):  --------------------------------------------------------------------------------------  ICU Vital Signs Last 24 Hrs  T(C): 36.1 (17 Jun 2019 04:41), Max: 36.7 (16 Jun 2019 22:00)  T(F): 96.9 (17 Jun 2019 04:41), Max: 98.1 (16 Jun 2019 22:00)  HR: 89 (17 Jun 2019 04:41) (84 - 92)  BP: 94/53 (17 Jun 2019 04:41) (90/54 - 108/65)  BP(mean): --  ABP: --  ABP(mean): --  RR: 18 (17 Jun 2019 04:41) (18 - 18)  SpO2: --    I&O's Summary    15 Lionel 2019 07:01  -  16 Jun 2019 07:00  --------------------------------------------------------  IN: 0 mL / OUT: 1600 mL / NET: -1600 mL    16 Jun 2019 07:01  -  17 Jun 2019 05:37  --------------------------------------------------------  IN: 1200 mL / OUT: 700 mL / NET: 500 mL      --------------------------------------------------------------------------------------    EXAM:  General/Neuro    Exam: Normal, NAD, alert, oriented x 3, no focal deficits. PERRLA      Respiratory  Exam: Lungs clear to auscultation, Normal expansion/effort.    [] Tracheostomy   [] Intubated  Mechanical Ventilation:     Cardiovascular  Exam: S1, S2.  Regular rate and rhythm.  Peripheral edema    Cardiac Rhythm: Normal Sinus Rhythm  ECHO:     GI  Exam: Abdomen soft, Non-tender, Non-distended.      Tubes/Lines/Drains    [x] Peripheral IV  [] Central Venous Line     	[] R	[] L	[] IJ	[] Fem	[] SC        Type:	    Date Placed:   [] Arterial Line		[] R	[] L	[] Fem	[] Rad	[] Ax	Date Placed:   [] PICC:         	[] Midline		[] Mediport           [] Urinary Catheter		Date Placed:     Extremities  Exam: Extremities warm, pink, well-perfused.      Derm:  Exam: Good skin turgor, no skin breakdown.         LABS  --------------------------------------------------------------------------------------  Labs:  CAPILLARY BLOOD GLUCOSE      POCT Blood Glucose.: 108 mg/dL (16 Jun 2019 21:16)  POCT Blood Glucose.: 124 mg/dL (16 Jun 2019 16:27)  POCT Blood Glucose.: 130 mg/dL (16 Jun 2019 11:12)  POCT Blood Glucose.: 90 mg/dL (16 Jun 2019 07:15)                          7.5    8.96  )-----------( 369      ( 15 Lionel 2019 07:45 )             23.9         06-15    144  |  112<H>  |  38<H>  ----------------------------<  87  4.4   |  19  |  3.8<H>          LFTs:         Coags:  --------------------------------------------------------------------------------------    OTHER LABS    IMAGING RESULTS      ASSESSMENT:  Assessment:  58y Male patient admitted S/P revised BKA with wound closure     Plan:  CHILANGO removed  Dressing changed   Diet regular  Pain control  Encourage incentive spirometer use

## 2019-06-17 NOTE — PROGRESS NOTE ADULT - SUBJECTIVE AND OBJECTIVE BOX
Nephrology progress note    Patient is seen and examined, events over the last 24 h noted.  no new complaints.    Allergies:  sulfamethizole (Unknown)    Hospital Medications:   MEDICATIONS  (STANDING):  ALPRAZolam 2 milliGRAM(s) Oral at bedtime  chlorhexidine 4% Liquid 1 Application(s) Topical <User Schedule>  Dakins Solution - 1/2 Strength 1 Application(s) Topical two times a day  diazepam    Tablet 10 milliGRAM(s) Oral two times a day  docusate sodium 100 milliGRAM(s) Oral three times a day  dronabinol 2.5 milliGRAM(s) Oral daily  heparin  Injectable 5000 Unit(s) SubCutaneous every 8 hours  insulin lispro (HumaLOG) corrective regimen sliding scale   SubCutaneous three times a day before meals  magnesium oxide 400 milliGRAM(s) Oral two times a day with meals  methadone    Tablet 10 milliGRAM(s) Oral daily  oxybutynin 5 milliGRAM(s) Oral daily  pantoprazole    Tablet 40 milliGRAM(s) Oral before breakfast  sevelamer carbonate 1600 milliGRAM(s) Oral three times a day with meals  sodium bicarbonate 650 milliGRAM(s) Oral three times a day        VITALS:  T(F): 96 (06-17-19 @ 08:30), Max: 98.1 (06-16-19 @ 22:00)  HR: 88 (06-17-19 @ 08:30)  BP: 111/62 (06-17-19 @ 08:30)  RR: 18 (06-17-19 @ 08:30)      06-15 @ 07:01  -  06-16 @ 07:00  --------------------------------------------------------  IN: 0 mL / OUT: 1600 mL / NET: -1600 mL    06-16 @ 07:01  -  06-17 @ 07:00  --------------------------------------------------------  IN: 1200 mL / OUT: 1200 mL / NET: 0 mL          PHYSICAL EXAM:  Constitutional: NAD  Respiratory: CTAB, no wheezes, rales or rhonchi  Cardiovascular: S1, S2, RRR  Gastrointestinal: BS+, soft, NT/ND  Extremities: right BKA  :  + brown.       LABS:            Urine Studies:      RADIOLOGY & ADDITIONAL STUDIES:

## 2019-06-17 NOTE — PROGRESS NOTE ADULT - ASSESSMENT
58 M w/ PMH of Paraplegia 2/2 Lumbar compression Fx, sacral decubiti and heel ulcers s/p debridements,, PAD, suprapubic cath,Neuropathy, DM2 & HTN that resolved w/ weight loss, is BIBA c/o foul smell & drainage from his RLE, found to be anemic, in SILVINA, & gangrene of RLE. Renal is consulted for SILVINA. sp right BKA sp revision    s/p  washout friday  # repeat labs today  # severe hypomagnesemia - on MAg Oxide 400 mg bid, repeat MG level  # non oliguric   # currently off ATB.  # continue sodium bicarb    # ph noted, corrected calcium around 9.2, on renagel CONT., repeat Ph level   # h/h noted, check iron stores, tx if h <7    # will follow

## 2019-06-17 NOTE — MEDICAL STUDENT PROGRESS NOTE(EDUCATION) - NS MD HP STUD ASPLAN ASSES FT
Patient is a 57 yo male with pmh of lumbar compression fracture at the age of 24, resulting in parapalegia/ wheelchair bound with subsequent sacral decubiti and heel ulcers s/p debridements in the past and currently( managed by Dr. 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

## 2019-06-17 NOTE — MEDICAL STUDENT PROGRESS NOTE(EDUCATION) - SUBJECTIVE AND OBJECTIVE BOX
Patient is a 57 yo male with pmh of lumbar compression fracture at the age of 24, resulting in parapalegia/ wheelchair bound with subsequent sacral decubiti and heel ulcers s/p debridements in the past and currently(managed by Dr. 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  Today is hospital day 13 and POD #13. Patient s/p revision on 6/13.    Events over 24h: The patient was seen and examined and reports he is having multiple (2-3) episodes of watery BMs. He reports he was eating and drinking.  PT voiding via catheter, wound dressing is dry, clean, and no oozing, blood, or pus observed. Per nurse, pt is refusing CBC, BMP, and sodium bicarb. Adds dressing was changed this morning, otherwise there were no overnight events.   Last BP: 94/53, HR- 89.      CURRENT MEDICATIONS:   --------------------------------------------------------------------------------------  Neurologic Medications  ALPRAZolam 2 milliGRAM(s) Oral at bedtime  diazepam    Tablet 10 milliGRAM(s) Oral two times a day  dronabinol 2.5 milliGRAM(s) Oral daily  methadone    Tablet 10 milliGRAM(s) Oral daily  morphine  - Injectable 2 milliGRAM(s) IV Push every 6 hours PRN severe breakthrough pain, only AFTER oral analgesics tried  oxyCODONE    IR 30 milliGRAM(s) Oral every 12 hours PRN Moderate Pain (4 - 6)    Respiratory Medications    Cardiovascular Medications    Gastrointestinal Medications  docusate sodium 100 milliGRAM(s) Oral three times a day  magnesium oxide 400 milliGRAM(s) Oral two times a day with meals  pantoprazole    Tablet 40 milliGRAM(s) Oral before breakfast  senna 2 Tablet(s) Oral daily PRN Constipation  sodium bicarbonate 650 milliGRAM(s) Oral three times a day    Genitourinary Medications  oxybutynin 5 milliGRAM(s) Oral daily    Hematologic/Oncologic Medications  heparin  Injectable 5000 Unit(s) SubCutaneous every 8 hours    Antimicrobial/Immunologic Medications    Endocrine/Metabolic Medications  insulin lispro (HumaLOG) corrective regimen sliding scale   SubCutaneous three times a day before meals    Topical/Other Medications  chlorhexidine 4% Liquid 1 Application(s) Topical <User Schedule>  Dakins Solution - 1/2 Strength 1 Application(s) Topical two times a day  naloxone Injectable 0.4 milliGRAM(s) IV Push once PRN OVERDOSE, DO NOT GIVE WITHOUT PROVIDER APPROVAL  sevelamer carbonate 1600 milliGRAM(s) Oral three times a day with meals    --------------------------------------------------------------------------------------    VITAL SIGNS, INS/OUTS (last 24 hours):  --------------------------------------------------------------------------------------  ICU Vital Signs Last 24 Hrs  T(C): 36.1 (17 Jun 2019 04:41), Max: 36.7 (16 Jun 2019 22:00)  T(F): 96.9 (17 Jun 2019 04:41), Max: 98.1 (16 Jun 2019 22:00)  HR: 89 (17 Jun 2019 04:41) (84 - 92)  BP: 94/53 (17 Jun 2019 04:41) (90/54 - 108/65)  BP(mean): --  ABP: --  ABP(mean): --  RR: 18 (17 Jun 2019 04:41) (18 - 18)  SpO2: --    PHYSICAL EXAM:    GENERAL: NAD    HEENT: NCAT    CHEST/LUNGS: CTAB    HEART: RRR,  No murmurs, rubs, or gallops    ABDOMEN: soft, nondistended, and nontender    EXTREMITIES:  FROM, No clubbing, cyanosis, or edema, palpable pulse    NEURO: No focal neurological deficits    SKIN: No rashes or lesions    INCISION/WOUNDS: Dressing clean, dry, and w/o oozing, blood, or purulent drainage      I&O's Summary    15 Lionel 2019 07:01  -  16 Jun 2019 07:00  --------------------------------------------------------  IN: 0 mL / OUT: 1600 mL / NET: -1600 mL    16 Jun 2019 07:01  -  17 Jun 2019 05:37  --------------------------------------------------------  IN: 1200 mL / OUT: 700 mL / NET: 500 mL      --------------------------------------------------------------------------------------        LABS    CAPILLARY BLOOD GLUCOSE      POCT Blood Glucose.: 108 mg/dL (16 Jun 2019 21:16)  POCT Blood Glucose.: 124 mg/dL (16 Jun 2019 16:27)  POCT Blood Glucose.: 130 mg/dL (16 Jun 2019 11:12)  POCT Blood Glucose.: 90 mg/dL (16 Jun 2019 07:15)                          7.5    8.96  )-----------( 369      ( 15 Lionel 2019 07:45 )             23.9         06-15    144  |  112<H>  |  38<H>  ----------------------------<  87  4.4   |  19  |  3.8<H>

## 2019-06-18 NOTE — PROGRESS NOTE ADULT - ASSESSMENT
58 M w/ PMH of Paraplegia 2/2 Lumbar compression Fx, sacral decubiti and heel ulcers s/p debridements,, PAD, suprapubic cath,Neuropathy, DM2 & HTN that resolved w/ weight loss, is BIBA c/o foul smell & drainage from his RLE, found to be anemic, in SILVINA, & gangrene of RLE. Renal is consulted for SILVINA. sp right BKA:  # creatinine stable  # non oliguric  # ph at goal , continue renagel  # BP on the low side, on no meds, will follow  # vascular f/up appreciated  # continue sodium bicarbonate po  # h/h noted, check iron stores, will need LINDY after checking iron stores  # no acute indication for RRT  # will follow

## 2019-06-18 NOTE — PROGRESS NOTE ADULT - SUBJECTIVE AND OBJECTIVE BOX
Patient: MARGE BELAL , 58y (1960)Male       Procedure/Diagnosis: S/p right BKA  Events over 24h: Patient reports pain, and requested for pain management consult. The patient was seen and examined and reports had BM this morning. He reports he was eating and drinking.  PT voiding via catheter, and wound dressing is dry, clean, and no oozing, blood, or pus observed.      Vitals: T(F): 97 (06-18-19 @ 05:00), Max: 97.4 (06-17-19 @ 14:15)  HR: 92 (06-18-19 @ 05:00)  BP: 97/55 (06-18-19 @ 05:00) (97/55 - 111/62)  RR: 18 (06-18-19 @ 05:00)      In:   06-16-19 @ 07:01  -  06-17-19 @ 07:00  --------------------------------------------------------  IN: 1200 mL    06-17-19 @ 07:01  -  06-18-19 @ 06:46  --------------------------------------------------------  IN: 520 mL      Out:   06-16-19 @ 07:01  -  06-17-19 @ 07:00  --------------------------------------------------------  OUT:    Indwelling Catheter - Suprapubic: 1200 mL  Total OUT: 1200 mL      06-17-19 @ 07:01  -  06-18-19 @ 06:46  --------------------------------------------------------  OUT:    Indwelling Catheter - Suprapubic: 1250 mL  Total OUT: 1250 mL        Net:   06-16-19 @ 07:01  -  06-17-19 @ 07:00  --------------------------------------------------------  NET: 0 mL    06-17-19 @ 07:01  -  06-18-19 @ 06:46  --------------------------------------------------------  NET: -730 mL      Diet: Diet, Regular:   No Concentrated Potassium (06-13-19 @ 13:13)    IV Fluids: Type: magnesium oxide 400 milliGRAM(s) Oral two times a day with meals  sodium bicarbonate 650 milliGRAM(s) Oral three times a day    Physical Examination:  General Appearance: NAD, alert and cooperative  HEENT: NCAT, WNL  Heart: S1 and S2. No murmurs. RRR  Lungs: CTABL  Abdomen: Soft, nondistended, nontender  Extremities: right BKA. Dressing clean, dry, and w/o oozing, blood, or purulent drainage    Medications: [Standing]  ALPRAZolam 2 milliGRAM(s) Oral at bedtime  chlorhexidine 4% Liquid 1 Application(s) Topical <User Schedule>  Dakins Solution - 1/2 Strength 1 Application(s) Topical two times a day  diazepam    Tablet 10 milliGRAM(s) Oral two times a day  docusate sodium 100 milliGRAM(s) Oral three times a day  dronabinol 2.5 milliGRAM(s) Oral daily  heparin  Injectable 5000 Unit(s) SubCutaneous every 8 hours  insulin lispro (HumaLOG) corrective regimen sliding scale   SubCutaneous three times a day before meals  magnesium oxide 400 milliGRAM(s) Oral two times a day with meals  methadone    Tablet 10 milliGRAM(s) Oral daily  oxybutynin 5 milliGRAM(s) Oral daily  pantoprazole    Tablet 40 milliGRAM(s) Oral before breakfast  sevelamer carbonate 1600 milliGRAM(s) Oral three times a day with meals  sodium bicarbonate 650 milliGRAM(s) Oral three times a day    DVT Prophylaxis: heparin  Injectable 5000 Unit(s) SubCutaneous every 8 hours    GI Prophylaxis: pantoprazole    Tablet 40 milliGRAM(s) Oral before breakfast    Antibiotics:   Anticoagulation:   Medications:[PRN]  morphine  - Injectable 2 milliGRAM(s) IV Push every 6 hours PRN  naloxone Injectable 0.4 milliGRAM(s) IV Push once PRN  oxyCODONE    IR 30 milliGRAM(s) Oral every 12 hours PRN  senna 2 Tablet(s) Oral daily PRN    Labs:                        7.6    7.81  )-----------( 380      ( 17 Jun 2019 19:39 )             24.7     06-17    137  |  108  |  37<H>  ----------------------------<  139<H>  4.2   |  19  |  3.6<H>    Ca    7.7<L>      17 Jun 2019 19:39  Phos  4.6     06-17  Mg     1.5     06-17

## 2019-06-18 NOTE — CONSULT NOTE ADULT - SUBJECTIVE AND OBJECTIVE BOX
Chief Complaint:     HPI:  This is a 58y Male HPI:  58 M w/ LE weakness 2/2 lumbar compression fracture at the age of 24, resulting in paraplgia/ wheelchair bound (can move LE but very weak, and has some sensation), with subsequent sacral decubiti and heel ulcers s/p debridements in the past, PAD, suprapubic cath, Chronic pain, Neuropathy, Insomnia, DM2 & HTN that resolved w/ weight loss, is BIBA c/o foul smell & drainage from his RLE. Pt states that he noticed skin changes, discoloration, drainage, and a foul smell a couple of days ago on his right foot, he usually follow up with dr pradhan for sacrum wound but hasn't seen him for about 4 months. He denies fevers, chills, sob, cp, admits to decreased appetite.   In ED pt had VS stable/wnl/ afebrile.  Labs - Hgb 6.2. Cr 4.9, K 5.2 on VBG, Ph 7.2.  XRAY of Rt foot Prelim - gas gangrene.  Podiatry, Vascular, Burn, & Renal were called.  Pt given - > Zosyn / Vanc / Flagyl / 2L IVF / & 2 U PRBCs (04 Jun 2019 02:29)      Allergies    sulfamethizole (Unknown)    Intolerances        PAST MEDICAL & SURGICAL HISTORY:  Hypertension  Suprapubic catheter  Pressure ulcer  Diabetes  Lumbar compression fracture  S/P debridement  Toe amputation status, right  H/O hernia repair        SOCIAL HISTORY:  Denies Smoking, Alcohol, or Drug Use    sulfamethizole (Unknown)      PAIN MEDICATIONS:  ALPRAZolam 2 milliGRAM(s) Oral at bedtime  diazepam    Tablet 10 milliGRAM(s) Oral two times a day  dronabinol 2.5 milliGRAM(s) Oral daily  methadone    Tablet 10 milliGRAM(s) Oral daily  morphine  - Injectable 2 milliGRAM(s) IV Push every 6 hours PRN  oxyCODONE    IR 30 milliGRAM(s) Oral every 12 hours PRN    Heme:  heparin  Injectable 5000 Unit(s) SubCutaneous every 8 hours    Antibiotics:    Cardiovascular:    GI:  docusate sodium 100 milliGRAM(s) Oral three times a day  pantoprazole    Tablet 40 milliGRAM(s) Oral before breakfast  senna 2 Tablet(s) Oral daily PRN    Endocrine:  insulin lispro (HumaLOG) corrective regimen sliding scale   SubCutaneous three times a day before meals    All Other Medications:  chlorhexidine 4% Liquid 1 Application(s) Topical <User Schedule>  Dakins Solution - 1/2 Strength 1 Application(s) Topical two times a day  magnesium oxide 400 milliGRAM(s) Oral two times a day with meals  naloxone Injectable 0.4 milliGRAM(s) IV Push once PRN  oxybutynin 5 milliGRAM(s) Oral daily  sevelamer carbonate 1600 milliGRAM(s) Oral three times a day with meals  sodium bicarbonate 650 milliGRAM(s) Oral three times a day      Vital Signs Last 24 Hrs  T(C): 35.7 (18 Jun 2019 09:00), Max: 36.3 (17 Jun 2019 14:15)  T(F): 96.2 (18 Jun 2019 09:00), Max: 97.4 (17 Jun 2019 14:15)  HR: 89 (18 Jun 2019 09:00) (86 - 94)  BP: 106/55 (18 Jun 2019 09:00) (97/55 - 111/55)  BP(mean): --  RR: 18 (18 Jun 2019 09:00) (18 - 18)  SpO2: --      06-17-19 @ 07:01  -  06-18-19 @ 07:00  --------------------------------------------------------  IN: 520 mL / OUT: 1250 mL / NET: -730 mL    06-18-19 @ 07:01  - 06-18-19 @ 13:34  --------------------------------------------------------  IN: 0 mL / OUT: 300 mL / NET: -300 mL        LABS:                          7.3    8.18  )-----------( 320      ( 18 Jun 2019 06:44 )             24.6     06-18    139  |  111<H>  |  36<H>  ----------------------------<  77  4.7   |  17  |  3.5<H>    Ca    8.0<L>      18 Jun 2019 06:44  Phos  4.9     06-18  Mg     1.8     06-18            RADIOLOGY:    Drug Screen:        [ ]  [place]  Reviewed on 6/18/19, 1:34P  Patient Name:	Almas Booker	YOB: 1960  Address:	[address] [place] [address]	Sex:	Male  Rx Written	Rx Dispensed	Drug	Quantity	Days Supply	Prescriber Name  05/13/2019	06/09/2019	alprazolam 2 mg tablet	30	30	VanChemo MD  05/10/2019	05/16/2019	alprazolam 2 mg tablet	30	30	VanChemo MD  05/13/2019	05/16/2019	diazepam 10 mg tablet	60	30	VanChemo MD  05/07/2019	05/09/2019	methadone hcl 10 mg tablet	960	30	VanChemo MD  05/07/2019	05/08/2019	oxycodone hcl 30 mg tablet	240	30	VanChemo MD  05/07/2019	05/08/2019	testosterone 50 mg/5 gram gel	150gm	30	VanChemo MD  04/09/2019	04/09/2019	methadone hcl 10 mg tablet	960	30	VanChemo MD  04/09/2019	04/09/2019	testosterone 50 mg/5 gram gel	150gm	30	VanChemo MD  04/09/2019	04/09/2019	oxycodone hcl 30 mg tablet	240	30	VanChemo MD  04/09/2019	04/09/2019	alprazolam 2 mg tablet	30	30	VanChemo MD  04/09/2019	04/09/2019	diazepam 10 mg tablet	60	30	VanChemo MD  03/12/2019	03/12/2019	methadone hcl 10 mg tablet	960	30	VanChemo MD  03/12/2019	03/12/2019	oxycodone hcl 30 mg tablet	240	30	VanChemo MD  03/12/2019	03/12/2019	testosterone 50 mg/5 gram gel	150gm	30	VanChemo otero MD  03/12/2019	03/12/2019	alprazolam 2 mg tablet	30	30	VanChemo MD  03/12/2019	03/12/2019	diazepam 10 mg tablet	60	30	VanChemo MD  02/12/2019	02/12/2019	methadone hcl 10 mg tablet	960	30	VanChemo MD  02/12/2019	02/12/2019	oxycodone hcl 30 mg tablet	240	30	VanChemo MD  02/12/2019	02/12/2019	alprazolam 2 mg tablet	30	30	VanChemo MD  02/12/2019	02/12/2019	diazepam 10 mg tablet	60	30	VanChemo MD  01/15/2019	01/15/2019	methadone hcl 10 mg tablet	960	30	VanChemo MD  01/15/2019	01/15/2019	oxycodone hcl 30 mg tablet	240	30	VanChemo MD  01/15/2019	01/15/2019	alprazolam 2 mg tablet	30	30	VanChemo MD  01/15/2019	01/15/2019	diazepam 10 mg tablet	60	30	VanChemo MD  12/18/2018	12/18/2018	oxycodone hcl 30 mg tablet	240	30	VanChemo MD  12/18/2018	12/18/2018	methadone hcl 10 mg tablet	960	30	VanChemo MD  12/18/2018	12/18/2018	diazepam 10 mg tablet	60	30	VanChemo MD  12/18/2018	12/18/2018	alprazolam 2 mg tablet	30	30	VanChemo MD  11/20/2018	11/20/2018	methadone hcl 10 mg tablet	960	30	VanChemo MD  11/20/2018	11/20/2018	oxycodone hcl 30 mg tablet	240	30	VanChemo MD  11/20/2018	11/20/2018	diazepam 10 mg tablet	60	30	VanChemo MD  11/20/2018	11/20/2018	alprazolam 2 mg tablet	30	30	VanChemo MD  10/18/2018	10/24/2018	methadone hcl 10 mg tablet	960	30	VanChemo MD  10/18/2018	10/24/2018	oxycodone hcl 30 mg tablet	240	30	VanChemo MD  10/18/2018	10/18/2018	diazepam 10 mg tablet	60	30	VanChemo MD  10/18/2018	10/18/2018	alprazolam 2 mg tablet	30	30	VanChemo MD  09/18/2018	09/26/2018	methadone hcl 10 mg tablet	960	30	VanChemo MD  09/18/2018	09/24/2018	oxycodone hcl 30 mg tablet	240	30	VanChemo MD  09/18/2018	09/24/2018	diazepam 10 mg tablet	60	30	VanChemo MD  09/18/2018	09/24/2018	alprazolam 2 mg tablet	30	30	VanChemo MD  08/21/2018	08/28/2018	methadone hcl 10 mg tablet	960	30	VanChemo MD  08/21/2018	08/25/2018	oxycodone hcl 30 mg tablet	240	30	VanChemo MD  07/26/2018	07/30/2018	oxycodone hcl 30 mg tablet	240	30	VanChemo MD  07/26/2018	07/30/2018	methadone hcl 10 mg tablet	960	30	VanChemo MD  07/26/2018	07/30/2018	diazepam 10 mg tablet	60	30	VanChemo MD  07/26/2018	07/30/2018	alprazolam 2 mg tablet	30	30	VanChemo MD  06/28/2018	07/09/2018	methadone hcl 10 mg tablet	960	30	VanChemo MD  06/28/2018	07/03/2018	oxycodone hcl 30 mg tablet	240	30	VanChemo MD  06/28/2018	07/03/2018	alprazolam 2 mg tablet	30	30	VanChemo MD  06/28/2018	07/03/2018	diazepam 10 mg tablet	60	30	VanChemo MD

## 2019-06-18 NOTE — CONSULT NOTE ADULT - ATTENDING COMMENTS
Xrays reviewed, soft tissue emphysema noted. Gross infection of right foot. Non-viable soft tissue envelope. I believe the patient foot is non-salvageable and patient would benefit from a BKA; this was discussed with patient. Case briefly discussed with the vascular team.
Agree with above    Please perform EKG abd monitor QTc given methadone dose
Pt seen and examined post op  discussed w/ resident, above note reviwed, agree w/ contents   SILVINA likely due to ATN in setting of hypotesnion/sepsi  Keep MAP > 65  workup as above    PT reports feeling depressed,   mother concerned, that has been neglecting huis health   may benefit from psych eval

## 2019-06-18 NOTE — CONSULT NOTE ADULT - PROBLEM SELECTOR RECOMMENDATION 9
ALPRAZolam 2 milliGRAM(s) Oral at bedtime  diazepam    Tablet 10 milliGRAM(s) Oral two times a day  dronabinol 2.5 milliGRAM(s) Oral daily  methadone    Tablet 80 milliGRAM(s) Oral Q6hrs marino  DC morphine  - Injectable 2 milliGRAM(s) IV Push every 6 hours PRN  DC oxyCODONE    IR 30 milliGRAM(s) Oral every 12 hours PRN  Acetaminophen 650mg PO Q6hrs marino

## 2019-06-18 NOTE — MEDICAL STUDENT PROGRESS NOTE(EDUCATION) - SUBJECTIVE AND OBJECTIVE BOX
Patient is a 57 yo male with pmh of lumbar compression fracture at the age of 24, resulting in parapalegia/ wheelchair bound with subsequent sacral decubiti and heel ulcers s/p debridements in the past and currently(managed by Dr. 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  Today is hospital day 14 and POD #14. Patient s/p revision on 6/13.  There were no overnight events. Per nurse,the patient reports pain, and requested for pain management consult. The patient was seen and examined and reports had BM this morning. He reports he was eating and drinking.  PT voiding via catheter, and wound dressing is dry, clean, and no oozing, blood, or pus observed.    CURRENT MEDICATIONS:   --------------------------------------------------------------------------------------  Neurologic Medications  ALPRAZolam 2 milliGRAM(s) Oral at bedtime  diazepam    Tablet 10 milliGRAM(s) Oral two times a day  dronabinol 2.5 milliGRAM(s) Oral daily  methadone    Tablet 10 milliGRAM(s) Oral daily  morphine  - Injectable 2 milliGRAM(s) IV Push every 6 hours PRN severe breakthrough pain, only AFTER oral analgesics tried  oxyCODONE    IR 30 milliGRAM(s) Oral every 12 hours PRN Moderate Pain (4 - 6)    Respiratory Medications    Cardiovascular Medications    Gastrointestinal Medications  docusate sodium 100 milliGRAM(s) Oral three times a day  magnesium oxide 400 milliGRAM(s) Oral two times a day with meals  pantoprazole    Tablet 40 milliGRAM(s) Oral before breakfast  senna 2 Tablet(s) Oral daily PRN Constipation  sodium bicarbonate 650 milliGRAM(s) Oral three times a day    Genitourinary Medications  oxybutynin 5 milliGRAM(s) Oral daily    Hematologic/Oncologic Medications  heparin  Injectable 5000 Unit(s) SubCutaneous every 8 hours    Antimicrobial/Immunologic Medications    Endocrine/Metabolic Medications  insulin lispro (HumaLOG) corrective regimen sliding scale   SubCutaneous three times a day before meals    Topical/Other Medications  chlorhexidine 4% Liquid 1 Application(s) Topical <User Schedule>  Dakins Solution - 1/2 Strength 1 Application(s) Topical two times a day  naloxone Injectable 0.4 milliGRAM(s) IV Push once PRN OVERDOSE, DO NOT GIVE WITHOUT PROVIDER APPROVAL  sevelamer carbonate 1600 milliGRAM(s) Oral three times a day with meals    --------------------------------------------------------------------------------------    VITAL SIGNS, INS/OUTS (last 24 hours):  --------------------------------------------------------------------------------------  ICU Vital Signs Last 24 Hrs  T(C): 36.1 (18 Jun 2019 05:00),   T(F): 97 (18 Jun 2019 05:00)  HR: 92 (18 Jun 2019 05:00)  BP: 97/55 (18 Jun 2019 05:00)    PHYSICAL EXAM:    GENERAL: NAD    HEENT: NCAT    CHEST/LUNGS: CTAB    HEART: RRR,  No murmurs, rubs, or gallops    ABDOMEN: soft, nondistended, and nontender    EXTREMITIES:  FROM, No clubbing, cyanosis, or edema, palpable pulse. Non tender to palpation    NEURO: No focal neurological deficits    SKIN: No rashes or lesions    INCISION/WOUNDS: Dressing clean, dry, and w/o oozing, blood, or purulent drainage      I&O's Summary    15 Lionel 2019 07:01  -  16 Jun 2019 07:00  --------------------------------------------------------  IN: 0 mL / OUT: 1600 mL / NET: -1600 mL    16 Jun 2019 07:01  -  17 Jun 2019 05:37  --------------------------------------------------------  IN: 1200 mL / OUT: 700 mL / NET: 500 mL      --------------------------------------------------------------------------------------        LABS    CAPILLARY BLOOD GLUCOSE      POCT Blood Glucose.: 108 mg/dL (16 Jun 2019 21:16)  POCT Blood Glucose.: 124 mg/dL (16 Jun 2019 16:27)  POCT Blood Glucose.: 130 mg/dL (16 Jun 2019 11:12)  POCT Blood Glucose.: 90 mg/dL (16 Jun 2019 07:15)                          7.6    7.81  )-----------( 380      ( 17 Jun 2019 19:39 )             24.7         06-17    137  |  108<H>  |  37<H>  ----------------------------<  87  4.2   |  19  |  3.6<H>

## 2019-06-18 NOTE — PROGRESS NOTE ADULT - SUBJECTIVE AND OBJECTIVE BOX
seen and examined  no distress  no new complaints       Standing Inpatient Medications  ALPRAZolam 2 milliGRAM(s) Oral at bedtime  chlorhexidine 4% Liquid 1 Application(s) Topical <User Schedule>  Dakins Solution - 1/2 Strength 1 Application(s) Topical two times a day  diazepam    Tablet 10 milliGRAM(s) Oral two times a day  docusate sodium 100 milliGRAM(s) Oral three times a day  dronabinol 2.5 milliGRAM(s) Oral daily  heparin  Injectable 5000 Unit(s) SubCutaneous every 8 hours  insulin lispro (HumaLOG) corrective regimen sliding scale   SubCutaneous three times a day before meals  magnesium oxide 400 milliGRAM(s) Oral two times a day with meals  methadone    Tablet 10 milliGRAM(s) Oral daily  oxybutynin 5 milliGRAM(s) Oral daily  pantoprazole    Tablet 40 milliGRAM(s) Oral before breakfast  sevelamer carbonate 1600 milliGRAM(s) Oral three times a day with meals  sodium bicarbonate 650 milliGRAM(s) Oral three times a day      VITALS/PHYSICAL EXAM  --------------------------------------------------------------------------------  T(C): 36.1 (06-18-19 @ 05:00), Max: 36.3 (06-17-19 @ 14:15)  HR: 92 (06-18-19 @ 05:00) (86 - 94)  BP: 97/55 (06-18-19 @ 05:00) (97/55 - 111/62)  RR: 18 (06-18-19 @ 05:00) (18 - 18)  SpO2: --  Wt(kg): --        06-17-19 @ 07:01  -  06-18-19 @ 07:00  --------------------------------------------------------  IN: 520 mL / OUT: 1250 mL / NET: -730 mL      Physical Exam:  	Gen: NAD  	Pulm: decrease BS  B/L  	CV: S1S2; no rub  	Abd: +distended, spc   	LE: BKA     LABS/STUDIES  --------------------------------------------------------------------------------              7.6    7.81  >-----------<  380      [06-17-19 @ 19:39]              24.7     137  |  108  |  37  ----------------------------<  139      [06-17-19 @ 19:39]  4.2   |  19  |  3.6        Ca     7.7     [06-17-19 @ 19:39]      Mg     1.5     [06-17-19 @ 19:39]      Phos  4.6     [06-17-19 @ 19:39]            Creatinine Trend:  SCr 3.6 [06-17 @ 19:39]  SCr 3.8 [06-15 @ 07:45]  SCr 4.0 [06-14 @ 07:04]  SCr 3.9 [06-13 @ 08:15]  SCr 4.0 [06-12 @ 22:00]    Urinalysis - [06-05-19 @ 00:13]      Color Yellow / Appearance Clear / SG 1.010 / pH 6.5      Gluc Negative / Ketone Negative  / Bili Negative / Urobili 0.2       Blood Trace / Protein 100 / Leuk Est Small / Nitrite Negative      RBC 1-2 / WBC 6-10 / Hyaline  / Gran  / Sq Epi  / Non Sq Epi Occasional / Bacteria Few      HbA1c 6.1      [06-04-19 @ 05:01]    HCV 0.11, Nonreact      [06-04-19 @ 05:01]

## 2019-06-18 NOTE — CONSULT NOTE ADULT - ASSESSMENT
REVIEW OF SYSTEMS    General:	NAD   Skin/Breast:	Neg  Ophthalmologic:	Neg  ENMT: Neg	  Respiratory and Thorax: Neg	  Cardiovascular:	Neg  Gastrointestinal:	Neg  Genitourinary:	Neg  Musculoskeletal:	Neg  Neurological:	Neg  Psychiatric:	Neg  Hematology/Lymphatics:	Neg  Endocrine:	Neg        PHYSICAL EXAM:    GENERAL: NAD, well-groomed, well-developed  HEAD:  Atraumatic, Normocephalic  EYES: EOMI, PERRLA, conjunctiva and sclera clear  ENMT: No tonsillar erythema, exudates, or enlargement; Moist mucous membranes, Good dentition, No lesions  NECK: Supple, No JVD, Normal thyroid  NERVOUS SYSTEM:  Alert & Oriented X3, Good concentration; Motor Strength 5/5 B/L upper and lower extremities  CHEST/LUNG: Clear to percussion bilaterally; No rales, rhonchi, wheezing, or rubs  HEART: Regular rate and rhythm; No murmurs, rubs, or gallops  ABDOMEN: Soft, Nontender, Nondistended; Bowel sounds present  EXTREMITIES: No clubbing, cyanosis, or edema, Right BKA  LYMPH: No lymphadenopathy noted  SKIN: No rashes or lesions      Patient lying in bed supine. Patient states pain is 9/10 before pain meds and  9/10 after pain meds. Pain is  9/10 now. Pain is described as sharp, constant, non-radiating, better with home meds, worse when he moves. Patient's I-Stop report verify's what patient stated he takes at home. Patient with no lethargy. Right leg with ace bandage in place. left leg with dressing to the left heel/ankle. movement and sensation present to all ext's. Patient has been taking methadone for many years.

## 2019-06-18 NOTE — PROGRESS NOTE ADULT - ASSESSMENT
Assessment:  58y Male patient admitted S/P right BKA    Plan:  ·	Diet regular  ·	Pain control  ·	Encourage incentive spirometer use  ·	Encourage OOB

## 2019-06-18 NOTE — CONSULT NOTE ADULT - CONSULT REASON
wet gangrene right foot ulcer
Multiple ulcers/open wound to b/l Feet
Pain to buttocks and legs
SEPSIS
SILVINA
SILVINA with metabolic acidosis
gait dysfunction
necrotizing fasciitis
right foot infection
right labia edema and erythema
s/p R daphne MCKEE
wet gangrene right foot

## 2019-06-19 NOTE — PROGRESS NOTE ADULT - ASSESSMENT
58 M w/ PMH of Paraplegia 2/2 Lumbar compression Fx, sacral decubiti and heel ulcers s/p debridements,, PAD, suprapubic cath,Neuropathy, DM2 & HTN that resolved w/ weight loss, is BIBA c/o foul smell & drainage from his RLE, found to be anemic, in SILVINA, & gangrene of RLE. Renal is consulted for SILVINA. sp right BKA:  # creatinine stable  # non oliguric  # ph noted continue binders   # continue sodium bicarbonate po  # h/h noted, check iron stores, will need LINDY after checking iron stores, tx if h <7  # no acute indication for RRT  # will follow

## 2019-06-19 NOTE — PROGRESS NOTE ADULT - SUBJECTIVE AND OBJECTIVE BOX
Patient: MARGE BELLA , 58y (1960)Male     Procedure/Diagnosis: S/p right BKA  Events over 24h: No acute overnight events, patient seen and examined bedside with no active complaints.     Vitals: T(F): 97.2 (06-19-19 @ 09:20), Max: 98 (06-18-19 @ 21:05)  HR: 87 (06-19-19 @ 09:20)  BP: 95/52 (06-19-19 @ 09:20) (93/59 - 114/62)  RR: 18 (06-19-19 @ 09:20)  SpO2: --    In:   06-18-19 @ 07:01  -  06-19-19 @ 07:00  --------------------------------------------------------  IN: 0 mL    06-19-19 @ 07:01  -  06-19-19 @ 11:12  --------------------------------------------------------  IN: 210 mL      Out:   06-18-19 @ 07:01  -  06-19-19 @ 07:00  --------------------------------------------------------  OUT:    Indwelling Catheter - Suprapubic: 1495 mL  Total OUT: 1495 mL      06-19-19 @ 07:01  -  06-19-19 @ 11:12  --------------------------------------------------------  OUT:    Indwelling Catheter - Suprapubic: 400 mL  Total OUT: 400 mL        Net:   06-18-19 @ 07:01 - 06-19-19 @ 07:00  --------------------------------------------------------  NET: -1495 mL    06-19-19 @ 07:01  -  06-19-19 @ 11:12  --------------------------------------------------------  NET: -190 mL      Diet: Diet, Regular:   No Concentrated Potassium (06-13-19 @ 13:13)    IV Fluids: Type: magnesium oxide 400 milliGRAM(s) Oral two times a day with meals  sodium bicarbonate 650 milliGRAM(s) Oral three times a day    Physical Examination:  General Appearance: NAD, alert and cooperative  HEENT: NCAT, WNL  Heart: S1 and S2. No murmurs. RRR  Lungs: CTABL  Abdomen: Soft, nondistended, nontender  Extremities: peripheral pulses 2+, no peripheral edema, full ROM    Medications: [Standing]  ALPRAZolam 2 milliGRAM(s) Oral at bedtime  chlorhexidine 4% Liquid 1 Application(s) Topical <User Schedule>  Dakins Solution - 1/2 Strength 1 Application(s) Topical two times a day  diazepam    Tablet 10 milliGRAM(s) Oral two times a day  docusate sodium 100 milliGRAM(s) Oral three times a day  dronabinol 2.5 milliGRAM(s) Oral daily  heparin  Injectable 5000 Unit(s) SubCutaneous every 8 hours  insulin lispro (HumaLOG) corrective regimen sliding scale   SubCutaneous three times a day before meals  magnesium oxide 400 milliGRAM(s) Oral two times a day with meals  methadone    Tablet 10 milliGRAM(s) Oral daily  oxybutynin 5 milliGRAM(s) Oral daily  pantoprazole    Tablet 40 milliGRAM(s) Oral before breakfast  sevelamer carbonate 1600 milliGRAM(s) Oral three times a day with meals  sodium bicarbonate 650 milliGRAM(s) Oral three times a day    DVT Prophylaxis: heparin  Injectable 5000 Unit(s) SubCutaneous every 8 hours    GI Prophylaxis: pantoprazole    Tablet 40 milliGRAM(s) Oral before breakfast    Antibiotics:   Anticoagulation:   Medications:[PRN]  morphine  - Injectable 2 milliGRAM(s) IV Push every 6 hours PRN  naloxone Injectable 0.4 milliGRAM(s) IV Push once PRN  oxyCODONE    IR 30 milliGRAM(s) Oral every 12 hours PRN  senna 2 Tablet(s) Oral daily PRN    Labs:                        7.0    7.17  )-----------( 327      ( 19 Jun 2019 06:46 )             23.1     06-19    139  |  112<H>  |  36<H>  ----------------------------<  100<H>  4.7   |  15<L>  |  3.6<H>    Ca    8.1<L>      19 Jun 2019 06:46  Phos  5.1     06-19  Mg     1.9     06-19

## 2019-06-19 NOTE — PROGRESS NOTE ADULT - SUBJECTIVE AND OBJECTIVE BOX
seen and examined  no distress  lying comfortable       Standing Inpatient Medications  ALPRAZolam 2 milliGRAM(s) Oral at bedtime  chlorhexidine 4% Liquid 1 Application(s) Topical <User Schedule>  Dakins Solution - 1/2 Strength 1 Application(s) Topical two times a day  diazepam    Tablet 10 milliGRAM(s) Oral two times a day  docusate sodium 100 milliGRAM(s) Oral three times a day  dronabinol 2.5 milliGRAM(s) Oral daily  heparin  Injectable 5000 Unit(s) SubCutaneous every 8 hours  insulin lispro (HumaLOG) corrective regimen sliding scale   SubCutaneous three times a day before meals  magnesium oxide 400 milliGRAM(s) Oral two times a day with meals  methadone    Tablet 10 milliGRAM(s) Oral daily  oxybutynin 5 milliGRAM(s) Oral daily  pantoprazole    Tablet 40 milliGRAM(s) Oral before breakfast  sevelamer carbonate 1600 milliGRAM(s) Oral three times a day with meals  sodium bicarbonate 650 milliGRAM(s) Oral three times a day    VITALS/PHYSICAL EXAM  --------------------------------------------------------------------------------  T(C): 35.8 (06-19-19 @ 04:54), Max: 36.7 (06-18-19 @ 21:05)  HR: 94 (06-19-19 @ 04:54) (85 - 94)  BP: 101/60 (06-19-19 @ 04:54) (93/59 - 114/62)  RR: 18 (06-19-19 @ 04:54) (18 - 18)  SpO2: --  Wt(kg): --        06-18-19 @ 07:01  -  06-19-19 @ 07:00  --------------------------------------------------------  IN: 0 mL / OUT: 1495 mL / NET: -1495 mL      Physical Exam:  	Gen: NAD  	Pulm: decrease BS  B/L  	CV:  S1S2; no rub  	Abd: +distended  	: spc  	LE: bka     LABS/STUDIES  --------------------------------------------------------------------------------              7.3    8.18  >-----------<  320      [06-18-19 @ 06:44]              24.6     139  |  111  |  36  ----------------------------<  77      [06-18-19 @ 06:44]  4.7   |  17  |  3.5        Ca     8.0     [06-18-19 @ 06:44]      Mg     1.8     [06-18-19 @ 06:44]      Phos  4.9     [06-18-19 @ 06:44]            Creatinine Trend:  SCr 3.5 [06-18 @ 06:44]  SCr 3.6 [06-17 @ 19:39]  SCr 3.8 [06-15 @ 07:45]  SCr 4.0 [06-14 @ 07:04]  SCr 3.9 [06-13 @ 08:15]    Urinalysis - [06-05-19 @ 00:13]      Color Yellow / Appearance Clear / SG 1.010 / pH 6.5      Gluc Negative / Ketone Negative  / Bili Negative / Urobili 0.2       Blood Trace / Protein 100 / Leuk Est Small / Nitrite Negative      RBC 1-2 / WBC 6-10 / Hyaline  / Gran  / Sq Epi  / Non Sq Epi Occasional / Bacteria Few      HbA1c 6.1      [06-04-19 @ 05:01]    HCV 0.11, Nonreact      [06-04-19 @ 05:01]

## 2019-06-19 NOTE — MEDICAL STUDENT PROGRESS NOTE(EDUCATION) - SUBJECTIVE AND OBJECTIVE BOX
Patient is a 59 yo male with pmh of lumbar compression fracture at the age of 24, resulting in parapalegia/ wheelchair bound with subsequent sacral decubiti and heel ulcers s/p debridements in the past and currently(managed by Dr. 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  Today is hospital day 14 and POD #14. Patient s/p revision on 6/13.    Per nurse, the patient reported pain throughout the night, otherwise there were no overnight events. Pain management saw the patient yesterday afternoon. No new recommendations or alterations to current plan are noted. Pt states the pain is localized to lower back and he has been unable to sleep through the night as a result. The patient was seen and examined. He reports up to 4 loose BMs overnight and that he has been eating and drinking.  Pt voiding via catheter, and wound dressing is dry, clean, and no oozing, blood, or pus observed. The patient was resting comfortably in bed.     CURRENT MEDICATIONS:   --------------------------------------------------------------------------------------  Neurologic Medications  ALPRAZolam 2 milliGRAM(s) Oral at bedtime  diazepam    Tablet 10 milliGRAM(s) Oral two times a day  dronabinol 2.5 milliGRAM(s) Oral daily  methadone    Tablet 10 milliGRAM(s) Oral daily  morphine  - Injectable 2 milliGRAM(s) IV Push every 6 hours PRN severe breakthrough pain, only AFTER oral analgesics tried  oxyCODONE    IR 30 milliGRAM(s) Oral every 12 hours PRN Moderate Pain (4 - 6)    Respiratory Medications    Cardiovascular Medications    Gastrointestinal Medications  docusate sodium 100 milliGRAM(s) Oral three times a day  magnesium oxide 400 milliGRAM(s) Oral two times a day with meals  pantoprazole    Tablet 40 milliGRAM(s) Oral before breakfast  senna 2 Tablet(s) Oral daily PRN Constipation  sodium bicarbonate 650 milliGRAM(s) Oral three times a day    Genitourinary Medications  oxybutynin 5 milliGRAM(s) Oral daily    Hematologic/Oncologic Medications  heparin  Injectable 5000 Unit(s) SubCutaneous every 8 hours    Antimicrobial/Immunologic Medications    Endocrine/Metabolic Medications  insulin lispro (HumaLOG) corrective regimen sliding scale   SubCutaneous three times a day before meals    Topical/Other Medications  chlorhexidine 4% Liquid 1 Application(s) Topical <User Schedule>  Dakins Solution - 1/2 Strength 1 Application(s) Topical two times a day  naloxone Injectable 0.4 milliGRAM(s) IV Push once PRN OVERDOSE, DO NOT GIVE WITHOUT PROVIDER APPROVAL  sevelamer carbonate 1600 milliGRAM(s) Oral three times a day with meals    --------------------------------------------------------------------------------------    VITAL SIGNS, INS/OUTS (last 24 hours):  --------------------------------------------------------------------------------------  ICU Vital Signs Last 24 Hrs  T(C): 35.8 (19 Jun 2019 06:00),   T(F): 96.4  (19 Jun 2019 06:00)  HR: 94 (19 Jun 2019 06:00)  BP: 101/60 (19 Jun 2019 06:00)    PHYSICAL EXAM:    GENERAL: NAD    HEENT: NCAT    CHEST/LUNGS: CTAB    HEART: RRR,  No murmurs, rubs, or gallops    ABDOMEN: soft, nondistended, and nontender    EXTREMITIES:  FROM, No clubbing, cyanosis, or edema, palpable pulse. Non tender to palpation    NEURO: No focal neurological deficits    SKIN: No rashes or lesions    INCISION/WOUNDS: Dressing clean, dry, and w/o oozing, blood, or purulent drainage      I&O's Summary    15 Lionel 2019 07:01  -  16 Jun 2019 07:00  --------------------------------------------------------  IN: 0 mL / OUT: 1600 mL / NET: -1600 mL    16 Jun 2019 07:01  -  17 Jun 2019 05:37  --------------------------------------------------------  IN: 1200 mL / OUT: 700 mL / NET: 500 mL      --------------------------------------------------------------------------------------      LABS                          7.3    8.18 )-----------( 320      ( 18 Jun 2019 06:44 )             24.6         06-18    139  |  111<H>  |  376H>  ----------------------------<  106  4.7   |  17  |  3.5H>

## 2019-06-19 NOTE — PROGRESS NOTE ADULT - ASSESSMENT
Assessment:  58y Male patient admitted S/P right BKA    Plan:  Diet regular  Pain control  Encourage incentive spirometer use  Encourage OOB

## 2019-06-20 NOTE — PROGRESS NOTE ADULT - SUBJECTIVE AND OBJECTIVE BOX
seen and examined  no distress  no new complaints     Standing Inpatient Medications  ALPRAZolam 2 milliGRAM(s) Oral at bedtime  chlorhexidine 4% Liquid 1 Application(s) Topical <User Schedule>  Dakins Solution - 1/2 Strength 1 Application(s) Topical two times a day  diazepam    Tablet 10 milliGRAM(s) Oral two times a day  docusate sodium 100 milliGRAM(s) Oral three times a day  dronabinol 2.5 milliGRAM(s) Oral daily  heparin  Injectable 5000 Unit(s) SubCutaneous every 8 hours  insulin lispro (HumaLOG) corrective regimen sliding scale   SubCutaneous three times a day before meals  magnesium oxide 400 milliGRAM(s) Oral two times a day with meals  methadone    Tablet 80 milliGRAM(s) Oral every 6 hours  oxybutynin 5 milliGRAM(s) Oral daily  pantoprazole    Tablet 40 milliGRAM(s) Oral before breakfast  sevelamer carbonate 1600 milliGRAM(s) Oral three times a day with meals  sodium bicarbonate 650 milliGRAM(s) Oral three times a day        VITALS/PHYSICAL EXAM  --------------------------------------------------------------------------------  T(C): 36.2 (06-20-19 @ 05:14), Max: 36.6 (06-19-19 @ 21:00)  HR: 92 (06-20-19 @ 05:14) (87 - 97)  BP: 109/59 (06-20-19 @ 05:14) (95/52 - 115/63)  RR: 18 (06-20-19 @ 05:14) (18 - 18)  SpO2: --  Wt(kg): --        06-19-19 @ 07:01  -  06-20-19 @ 07:00  --------------------------------------------------------  IN: 420 mL / OUT: 2125 mL / NET: -1705 mL      Physical Exam:  	Gen: NAD  	Pulm: CTA B/L  	CV: S1S2; no rub  	Abd: +distended  	:  SPC  	LE:  bka     LABS/STUDIES  --------------------------------------------------------------------------------              7.0    7.17  >-----------<  327      [06-19-19 @ 06:46]              23.1     139  |  112  |  36  ----------------------------<  100      [06-19-19 @ 06:46]  4.7   |  15  |  3.6        Ca     8.1     [06-19-19 @ 06:46]      Mg     1.9     [06-19-19 @ 06:46]      Phos  5.1     [06-19-19 @ 06:46]            Creatinine Trend:  SCr 3.6 [06-19 @ 06:46]  SCr 3.5 [06-18 @ 06:44]  SCr 3.6 [06-17 @ 19:39]  SCr 3.8 [06-15 @ 07:45]  SCr 4.0 [06-14 @ 07:04]    Urinalysis - [06-05-19 @ 00:13]      Color Yellow / Appearance Clear / SG 1.010 / pH 6.5      Gluc Negative / Ketone Negative  / Bili Negative / Urobili 0.2       Blood Trace / Protein 100 / Leuk Est Small / Nitrite Negative      RBC 1-2 / WBC 6-10 / Hyaline  / Gran  / Sq Epi  / Non Sq Epi Occasional / Bacteria Few      Iron 39, TIBC 77, %sat 51      [06-19-19 @ 06:46]  HbA1c 6.1      [06-04-19 @ 05:01]    HCV 0.11, Nonreact      [06-04-19 @ 05:01]

## 2019-06-20 NOTE — PROGRESS NOTE ADULT - SUBJECTIVE AND OBJECTIVE BOX
GENERAL SURGERY PROGRESS NOTE     MARGE BELLA  58y  Male  Hospital day :16d  Procedure: Amputation below knee  Irrigation and excisional debridement of tissue down to bone  [facility] amputation below knee    OVERNIGHT EVENTS:  NO acute events overnight.     T(F): 97.2 (06-20-19 @ 05:14), Max: 97.8 (06-19-19 @ 21:00)  HR: 92 (06-20-19 @ 05:14) (89 - 97)  BP: 109/59 (06-20-19 @ 05:14) (95/53 - 115/63)  RR: 18 (06-20-19 @ 05:14) (18 - 18)      DIET/FLUIDS: magnesium oxide 400 milliGRAM(s) Oral two times a day with meals  sodium bicarbonate 650 milliGRAM(s) Oral three times a day       GI proph:  pantoprazole    Tablet 40 milliGRAM(s) Oral before breakfast    AC/ proph: heparin  Injectable 5000 Unit(s) SubCutaneous every 8 hours    ABx:     PHYSICAL EXAM:  GENERAL: NAD, well-appearing  CHEST/LUNG: no obvious   EXTREMITIES:  RLE BKA. dressing c/d/i      LABS    POCT Blood Glucose.: 97 mg/dL (20 Jun 2019 07:31)  POCT Blood Glucose.: 97 mg/dL (19 Jun 2019 20:52)  POCT Blood Glucose.: 80 mg/dL (19 Jun 2019 16:34)  POCT Blood Glucose.: 105 mg/dL (19 Jun 2019 11:18)                          7.0    7.17  )-----------( 327      ( 19 Jun 2019 06:46 )             23.1         06-19    139  |  112<H>  |  36<H>  ----------------------------<  100<H>  4.7   |  15<L>  |  3.6<H>

## 2019-06-20 NOTE — PROGRESS NOTE ADULT - SUBJECTIVE AND OBJECTIVE BOX
Patient sleeping and easily awakened. Patient states pain is 8/10 now. Discussed with team. Recommendations made on consult will be followed today. Will evaluate tomorrow.

## 2019-06-20 NOTE — PROGRESS NOTE ADULT - ASSESSMENT
58y Male patient admitted S/P right BKA    Plan:  Diet regular  Pain control  Encourage incentive spirometer use  PT to see today to work on transfers. If can transfer safely consider d/c to home

## 2019-06-20 NOTE — MEDICAL STUDENT PROGRESS NOTE(EDUCATION) - SUBJECTIVE AND OBJECTIVE BOX
Patient is a 59 yo male with pmh of lumbar compression fracture at the age of 24, resulting in parapalegia/ wheelchair bound with subsequent sacral decubiti and heel ulcers s/p debridements in the past and currently(managed by Dr. 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  Today is hospital day 14 and POD #14. Patient s/p revision on 6/13.    The patient still has c/o pain, but appears in NAD and was resting comfortably in bed. There were no overnight events. The patient was seen and examined. He still reports up to 4 loose BMs overnight and that he has been eating and drinking. Pt attempted to move into chair yesterday. Physiatry also following.  Pt voiding via catheter, and wound dressing is dry, clean, and no oozing, blood, or pus observed. Nurse also notes there were no overnight events.    CURRENT MEDICATIONS:   --------------------------------------------------------------------------------------  Neurologic Medications  ALPRAZolam 2 milliGRAM(s) Oral at bedtime  diazepam    Tablet 10 milliGRAM(s) Oral two times a day  dronabinol 2.5 milliGRAM(s) Oral daily  methadone    Tablet 10 milliGRAM(s) Oral daily  morphine  - Injectable 2 milliGRAM(s) IV Push every 6 hours PRN severe breakthrough pain, only AFTER oral analgesics tried  oxyCODONE    IR 30 milliGRAM(s) Oral every 12 hours PRN Moderate Pain (4 - 6)    Respiratory Medications    Cardiovascular Medications    Gastrointestinal Medications  docusate sodium 100 milliGRAM(s) Oral three times a day  magnesium oxide 400 milliGRAM(s) Oral two times a day with meals  pantoprazole    Tablet 40 milliGRAM(s) Oral before breakfast  senna 2 Tablet(s) Oral daily PRN Constipation  sodium bicarbonate 650 milliGRAM(s) Oral three times a day    Genitourinary Medications  oxybutynin 5 milliGRAM(s) Oral daily    Hematologic/Oncologic Medications  heparin  Injectable 5000 Unit(s) SubCutaneous every 8 hours    Antimicrobial/Immunologic Medications    Endocrine/Metabolic Medications  insulin lispro (HumaLOG) corrective regimen sliding scale   SubCutaneous three times a day before meals    Topical/Other Medications  chlorhexidine 4% Liquid 1 Application(s) Topical <User Schedule>  Dakins Solution - 1/2 Strength 1 Application(s) Topical two times a day  naloxone Injectable 0.4 milliGRAM(s) IV Push once PRN OVERDOSE, DO NOT GIVE WITHOUT PROVIDER APPROVAL  sevelamer carbonate 1600 milliGRAM(s) Oral three times a day with meals    --------------------------------------------------------------------------------------    VITAL SIGNS, INS/OUTS (last 24 hours):  --------------------------------------------------------------------------------------  ICU Vital Signs Last 24 Hrs  T(C): 35.8 (19 Jun 2019 06:00),   T(F): 96.4  (19 Jun 2019 06:00)  HR: 94 (19 Jun 2019 06:00)  BP: 101/60 (19 Jun 2019 06:00)    PHYSICAL EXAM:    GENERAL: NAD    HEENT: NCAT    CHEST/LUNGS: CTAB    HEART: RRR,  No murmurs, rubs, or gallops    ABDOMEN: soft, nondistended, and nontender    EXTREMITIES:  FROM, No clubbing, cyanosis, or edema, palpable pulse. Non tender to palpation    NEURO: No focal neurological deficits    SKIN: No rashes or lesions    INCISION/WOUNDS: Dressing clean, dry, and w/o oozing, blood, or purulent drainage      I&O's Summary    15 Lionel 2019 07:01  -  16 Jun 2019 07:00  --------------------------------------------------------  IN: 0 mL / OUT: 1600 mL / NET: -1600 mL    16 Jun 2019 07:01  -  17 Jun 2019 05:37  --------------------------------------------------------  IN: 1200 mL / OUT: 700 mL / NET: 500 mL      --------------------------------------------------------------------------------------      LABS                          7.3    8.18 )-----------( 320      ( 18 Jun 2019 06:44 )             24.6         06-18    139  |  111<H>  |  376H>  ----------------------------<  106  4.7   |  17  |  3.5H>

## 2019-06-20 NOTE — CHART NOTE - NSCHARTNOTEFT_GEN_A_CORE
Registered Dietitian Note    RD spoke w/ pt today. Full f/u note to be completed w/in 24hrs.   Pt does not want any nutrition interventions at this time.

## 2019-06-20 NOTE — CHART NOTE - NSCHARTNOTESELECT_GEN_ALL_CORE
Transfer Note/SICU
Event Note
Event Note
Event Note/dietitian f/u
Event Note/f/u
PACU Admission note
Post-Op Check
Vascular Surgery/Event Note
post op check/Event Note

## 2019-06-20 NOTE — PROGRESS NOTE ADULT - SUBJECTIVE AND OBJECTIVE BOX
EKG performed yesterday secondary to Methadone dosage >100mg.       Ventricular Rate 84 BPM    Atrial Rate 84 BPM    P-R Interval 190 ms    QRS Duration 78 ms    Q-T Interval 364 ms    QTC Calculation(Bezet) 430 ms    P Axis 60 degrees    R Axis 38 degrees    T Axis 43 degrees    Diagnosis Line Normal sinus rhythm  Normal ECG    Confirmed by Sesar Lowe ([id number]) on 6/20/2019 6:01:40 AM

## 2019-06-20 NOTE — PHARMACOTHERAPY INTERVENTION NOTE - COMMENTS
Zofran 4mg IV q8h ATC-d/w surgical team to evaluate ATC frequency, team d/c Zofran
d5-1/2ns +20meq k     k=5.2. I s/w blue team [id number] to d/c k
methadone 80mg q6h. I s/w KATE tavera---she said she s/w pain m'ment and he recommended this dose  and the pt has been taking this dose and frequencg at home. also I read CHETNA grajeda 9PAIN M'MENT)  note ---he recommends 80mg q6h on 6/18
spoke to PA, target INR<1.5

## 2019-06-21 NOTE — DISCHARGE NOTE PROVIDER - NSDCFUADDINST_GEN_ALL_CORE_FT
Right BKA wound care: xeroform, gauze, kerlix and ace wrap, daily changes  Go to ED with fevers, chills, foul smell, drainage, abd pain, back pain

## 2019-06-21 NOTE — MEDICAL STUDENT PROGRESS NOTE(EDUCATION) - SUBJECTIVE AND OBJECTIVE BOX
Patient is a 59 yo male with pmh of lumbar compression fracture at the age of 24, resulting in parapalegia/ wheelchair bound with subsequent sacral decubiti and heel ulcers s/p debridements in the past and currently(managed by Dr. 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  Today is hospital day 14 and POD #14. Patient s/p revision on 6/13.    Today, the patient has no complaints and says he feels comfortable going home. He refuses going to SNF. Dispo home is dependent on ability to transfer to his chair on own, which the patient and nurse endorsed occurred yesterday.  There were no overnight events. The patient was seen and examined. He still reports loose stools are resolving, he is voiding via catheter, and he explains that he has been eating and drinking.     T(F): 97.9 (06-21-19 @ 02:00), Max: 97.9 (06-21-19 @ 02:00)  HR: 86 (06-21-19 @ 02:00) (86 - 104)  BP: 97/57 (06-21-19 @ 02:00) (95/52 - 110/56)  RR: 18 (06-21-19 @ 02:00) (18 - 18)      DIET/FLUIDS: magnesium oxide 400 milliGRAM(s) Oral two times a day with meals  sodium bicarbonate 650 milliGRAM(s) Oral three times a day       GI proph:  pantoprazole    Tablet 40 milliGRAM(s) Oral before breakfast    AC/ proph: heparin  Injectable 5000 Unit(s) SubCutaneous every 8 hours    ABx:     PHYSICAL EXAM:    GENERAL: NAD    HEENT: NCAT    CHEST/LUNGS: CTAB    HEART: RRR,  No murmurs, rubs, or gallops    ABDOMEN: soft, nondistended, and nontender, BSx4    EXTREMITIES:  FROM, No clubbing, cyanosis, or edema, palpable pulse. Non tender to palpation    NEURO: No focal neurological deficits    SKIN: No rashes or lesions      LABS    POCT Blood Glucose.: 88 mg/dL (20 Jun 2019 21:08)  POCT Blood Glucose.: 97 mg/dL (20 Jun 2019 16:21)  POCT Blood Glucose.: 102 mg/dL (20 Jun 2019 11:28)  POCT Blood Glucose.: 97 mg/dL (20 Jun 2019 07:31)                          7.0    7.17  )-----------( 327      ( 19 Jun 2019 06:46 )             23.1         06-19    139  |  112<H>  |  36<H>  ----------------------------<  100<H>  4.7   |  15<L>  |  3.6<H>

## 2019-06-21 NOTE — PROGRESS NOTE ADULT - ASSESSMENT
s/p Lois MCKEE and closure    PLAN:    - Pt stating patient needs assistance on transfer, will have to see if can transfer alone to be safe to return home    - pain control per pain management team     - stable medically, however working on safe dispo at this point as pt is refusing SNF

## 2019-06-21 NOTE — MEDICAL STUDENT PROGRESS NOTE(EDUCATION) - NS MD HP STUD ASPLAN ASSES FT
Patient is a 59 yo male with pmh of lumbar compression fracture at the age of 24, resulting in parapalegia/ wheelchair bound with subsequent sacral decubiti and heel ulcers s/p debridements in the past and currently( managed by Dr. 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

## 2019-06-21 NOTE — DISCHARGE NOTE PROVIDER - HOSPITAL COURSE
58 M w/ LE weakness 2/2 lumbar compression fracture at the age of 24, resulting in paraplegia wheelchair bound (can move LE but very weak, and has some sensation), with subsequent sacral decubiti and heel ulcers s/p debridements in the past, PAD, suprapubic cath, Chronic pain, Neuropathy, Insomnia, DM2 & HTN that resolved w/ weight loss, is BIBA c/o foul smell & drainage from his RLE        PROBLEMS    #Sepsis ruled out on admission, systolic hypotension, WBC>12    Necrotizing fasciitis s/p OR 6/4 Right Below knee guillotine amputation, underwent wash out of the stump 6/10, then closure at 6/13/19    Pt has an acute illness which poses threat to bodily function, resolved     BCX NG    OR cultures with Ecoli (R unasyn, fluoro, bactrim, I cefazolin)     2/p OR 6/10 with debridement     #SILVINA     #Hyponatremia/Hypomagnesia    #Hypotension 2/2 opioids     #R chest IV phlebitis        Pt has been on following abx:    - Ceftriaxone 2g q24h and flagyl 500mg q8h IV    - abx were discontinued after stump closure        #sacral ulcer: local wound care with Santyl, gauze, abd pain and tape, daily        Pain management recommended:     ALPRAZolam 2 milliGRAM(s) Oral at bedtime    diazepam    Tablet 10 milliGRAM(s) Oral two times a day    dronabinol 2.5 milliGRAM(s) Oral daily    methadone    Tablet 80 milliGRAM(s) Oral Q6hrs marino    DC morphine  - Injectable 2 milliGRAM(s) IV Push every 6 hours PRN    DC oxyCODONE    IR 30 milliGRAM(s) Oral every 12 hours PRN    Acetaminophen 650mg PO Q6hrs marino.        Pt wished to go home and refused to go to SNF. He was evaluated by OT/PT prior his discharge.

## 2019-06-21 NOTE — PROGRESS NOTE ADULT - SUBJECTIVE AND OBJECTIVE BOX
GENERAL SURGERY PROGRESS NOTE     MARGE BELLA  58y  Male  Hospital day :17d    Procedure: Amputation below knee  Irrigation and excisional debridement of tissue down to bone  [facility]    OVERNIGHT EVENTS:  No acute overnight events. Worked with PT but still needs some assistance to transfer.     T(F): 97.9 (06-21-19 @ 02:00), Max: 97.9 (06-21-19 @ 02:00)  HR: 86 (06-21-19 @ 02:00) (86 - 104)  BP: 97/57 (06-21-19 @ 02:00) (95/52 - 110/56)  RR: 18 (06-21-19 @ 02:00) (18 - 18)      DIET/FLUIDS: magnesium oxide 400 milliGRAM(s) Oral two times a day with meals  sodium bicarbonate 650 milliGRAM(s) Oral three times a day       GI proph:  pantoprazole    Tablet 40 milliGRAM(s) Oral before breakfast    AC/ proph: heparin  Injectable 5000 Unit(s) SubCutaneous every 8 hours    ABx:     PHYSICAL EXAM:  GENERAL: NAD, well-appearing  CHEST/LUNG: no obvious increased wob   EXTREMITIES: LLE BKA. RLE BKA with intact dressing, able to move. skin warm with capillary refill.       LABS    POCT Blood Glucose.: 88 mg/dL (20 Jun 2019 21:08)  POCT Blood Glucose.: 97 mg/dL (20 Jun 2019 16:21)  POCT Blood Glucose.: 102 mg/dL (20 Jun 2019 11:28)  POCT Blood Glucose.: 97 mg/dL (20 Jun 2019 07:31)                          7.0    7.17  )-----------( 327      ( 19 Jun 2019 06:46 )             23.1         06-19    139  |  112<H>  |  36<H>  ----------------------------<  100<H>  4.7   |  15<L>  |  3.6<H>

## 2019-06-21 NOTE — DISCHARGE NOTE NURSING/CASE MANAGEMENT/SOCIAL WORK - NSDCDPATPORTLINK_GEN_ALL_CORE
You can access the [facility] Patient Portal, offered by [facility], by registering with the following website: [email or web address]

## 2019-06-21 NOTE — DISCHARGE NOTE PROVIDER - CARE PROVIDERS DIRECT ADDRESSES
,[email or web address]

## 2019-06-21 NOTE — DISCHARGE NOTE NURSING/CASE MANAGEMENT/SOCIAL WORK - NSDCPEWEB_GEN_ALL_CORE
[place] website --- [email or web address] for Tobacco Control website --- [email or web address]

## 2019-06-21 NOTE — MEDICAL STUDENT PROGRESS NOTE(EDUCATION) - NS MD HP STUD ASPLAN PLAN FT
Diet regular  Pain controlled  Encourage incentive spirometer use  Encourage OOB to chair  Discharge planning

## 2019-06-21 NOTE — PROGRESS NOTE ADULT - PROVIDER SPECIALTY LIST ADULT
Infectious Disease
Nephrology
Pain Medicine
Pain Medicine
SICU
Surgery
Vascular Surgery
Nephrology
Surgery
Vascular Surgery
Vascular Surgery
Infectious Disease

## 2019-06-21 NOTE — DISCHARGE NOTE PROVIDER - NSDCCPCAREPLAN_GEN_ALL_CORE_FT
PRINCIPAL DISCHARGE DIAGNOSIS  Diagnosis: Gas gangrene of foot  Assessment and Plan of Treatment: s/p below knee amputation      SECONDARY DISCHARGE DIAGNOSES  Diagnosis: Decubitus ulcer  Assessment and Plan of Treatment: local wound care with Dakins vs Santyl, gauze, abd pad and tape    Diagnosis: Anemia  Assessment and Plan of Treatment: stable chronic anemia    Diagnosis: SILVINA (acute kidney injury)  Assessment and Plan of Treatment: SILVINA on CKD. stable    Diagnosis: Sepsis  Assessment and Plan of Treatment: resolved after BKA

## 2019-06-26 NOTE — H&P ADULT - HISTORY OF PRESENT ILLNESS
58 years old male with reported PMH of HTN, chronic back pain, L1 burst fracture with paraplegia since 1986, PAD s/p recent BKA on 6/4, presented with weakness and generalized pain. 58 years old male with reported PMH of HTN, chronic back pain, chronic anemia, L1 burst fracture with paraplegia since 1986 (bedbound and wheelchair bound), PAD s/p recent BKA on 6/4, neurogenic bladder with suprapubic catheter presented with weakness and generalized pain. History was also obtained from patient's mother at bedside. Patient was found to be on the floor this morning by his mother. Patient states that he was trying to transfer himself from bed to his wheelchair but he fell on the floor. Patient denies head trauma or LOC. His mother later found him and called the ambulance. Patient was recently admitted to [facility] for RLE gangrene and had BKA done on 6/4. Patient was discharged on 6/21 to SNF initially but patient refused, so he was discharged home instead. Patient has fallen at home 3 times since his discharge. Patient reports weakness, bilateral shoulder pain, and chronic low back pain. Patient denies fever/chills, cough, chest pain, shortness of breath, abdominal pain, nausea/vomiting, diarrhea/constipation. Patient was found to be anemic with hemoglobin of 6 (baseline 8-9) and elevated creatinine of 4.8 (baseline 0.8 in 2018, 3.8 in 2019).    In ED: 1 unit PRBC, 1L NS 58 years old male with reported PMH of HTN, chronic back pain, chronic anemia, L1 burst fracture with paraplegia since 1986 (bedbound and wheelchair bound), PAD s/p recent BKA on 6/4, neurogenic bladder with suprapubic catheter (recently changed 3 days ago) presented with weakness and generalized pain. History was also obtained from patient's mother at bedside. Patient was found to be on the floor this morning by his mother. Patient states that he was trying to transfer himself from bed to his wheelchair but he fell on the floor. Patient denies head trauma or LOC. His mother later found him and called the ambulance. Patient was recently admitted to [facility] for RLE gangrene and had BKA done on 6/4. Patient was discharged on 6/21 to SNF initially but patient refused, so he was discharged home instead. Patient has fallen at home 3 times since his discharge. Patient reports weakness, bilateral shoulder pain, and chronic low back pain. Patient denies fever/chills, cough, chest pain, shortness of breath, abdominal pain, nausea/vomiting, diarrhea/constipation. Patient was found to be anemic with hemoglobin of 6 (baseline 8-9) and elevated creatinine of 4.8 (baseline 0.8 in 2018, 3.8 in 2019).    In ED: 1 unit PRBC, 1L NS

## 2019-06-26 NOTE — ED PROVIDER NOTE - NS ED ROS FT
Constitutional: See HPI.  Eyes: No visual changes, eye pain or discharge.   ENMT: No hearing changes, pain, discharge or infections. No neck pain or stiffness. No limited ROM  Cardiac: No SOB or edema. No chest pain with exertion.  Respiratory: No cough or respiratory distress.   GI: No nausea, vomiting, diarrhea or abdominal pain.  : No dysuria, frequency or burning. No Discharge  MS: No myalgia, muscle weakness, joint pain or back pain.  Neuro: No headache or weakness.   Skin: chronic ulcers of sacrum/buttock  Except as documented in the HPI, all other systems are negative.

## 2019-06-26 NOTE — H&P ADULT - ASSESSMENT
58 years old male with reported PMH of HTN, chronic back pain, chronic anemia, L1 burst fracture with paraplegia since 1986 (bedbound and wheelchair bound), PAD s/p recent BKA on 6/4, neurogenic bladder with suprapubic catheter presented with weakness and generalized pain    # Acute on chronic normocytic anemia  - Hgb 6 on admission, baseline 8-9  - No signs of active bleeding, rectal exam shows brown stool  - s/p 1 units of PRBC, will follow up post-transfusion CBC  - Keep two 18-gauge IVs  - Active T&S. keep hgb above 7  - CBC daily  - Check iron studies, B12, folate    # Acute kidney injury on CKD 4  - Possibly due to pre-renal 2/2 dehydration and anima  - Kidney US on 6/4 shows no hydronephrosis, bilateral echogenic kidney compatible with medical renal disease  - Having adequate urine output from suprapubic catheter  - Check urine lytes  - Encourage oral hydration  - BMP daily    # HTN  - Well- controlled  - Monitor off medication for now    # DM  - Was on Januvia but became hypoglycemic  - Check A1C    # Chronic back pain  - Continue methadone 80mg Q6hrs, Oxycodone 30mg Q6hr PRN  - Renally dosed gabapentin (200mg QD)  - PT/Rehab    # Anxiety  - Continue Valium and Ativan      DVT ppx: Heparin subQ  GI ppx: Not indicated  Diet: DASH  Activity: increase as tolerated  Code status: full code  Dispo: acute, pending PT/Rehab consult 58 years old male with reported PMH of HTN, chronic back pain, chronic anemia, L1 burst fracture with paraplegia since 1986 (bedbound and wheelchair bound), PAD s/p recent BKA on 6/4, neurogenic bladder with suprapubic catheter presented with weakness and generalized pain    # Acute on chronic normocytic anemia  - Hgb 6 on admission, baseline 8-9  - No signs of active bleeding, rectal exam shows brown stool  - s/p 1 units of PRBC, will follow up post-transfusion CBC  - Keep two 18-gauge IVs  - Active T&S. keep hgb above 7  - CBC daily  - Check iron studies, B12, folate    # Acute kidney injury on CKD 4  - Possibly due to pre-renal 2/2 dehydration and anima  - Kidney US on 6/4 shows no hydronephrosis, bilateral echogenic kidney compatible with medical renal disease  - Having adequate urine output from suprapubic catheter  - Check urine lytes, UA  - Encourage oral hydration  - BMP daily    # HTN  - Well- controlled  - Monitor off medication for now    # DM  - Was on Januvia but became hypoglycemic  - Check A1C    # Chronic back pain with functional paraplegia  - Continue methadone 80mg Q6hrs (approved by pain management in last visit), Oxycodone 30mg Q6hr PRN  - Renally dosed gabapentin (200mg QD)  - PT/Rehab (patient refused SNF but now agrees with rehab)    # Anxiety  - Continue Valium and Ativan      DVT ppx: Heparin subQ  GI ppx: Not indicated  Diet: DASH  Activity: increase as tolerated  Code status: full code  Dispo: acute, pending PT/Rehab consult

## 2019-06-26 NOTE — ED PROVIDER NOTE - PROGRESS NOTE DETAILS
Discussed case with MAR.  Aware of admission. pt with acute on chronic rf. K 5.3 but no ekg changes.

## 2019-06-26 NOTE — H&P ADULT - NSICDXPASTMEDICALHX_GEN_ALL_CORE_FT
PAST MEDICAL HISTORY:  Anemia     Diabetes     Hypertension     Lumbar compression fracture     PAD (peripheral artery disease)     Pressure ulcer     Suprapubic catheter

## 2019-06-26 NOTE — ED PROVIDER NOTE - PSH
H/O hernia repair    S/P below knee amputation, right    S/P debridement    Toe amputation status, right

## 2019-06-26 NOTE — ED PROVIDER NOTE - OBJECTIVE STATEMENT
58 y.o male w/ of necrotising fascitis with R BKA, C1 fx w/ paraplegia, chronic decubitus ulcers, DM, HTN presents to the ED for rehab.  Was d/c home after BKA right knee after refusing rehab.  Since being discharged has not been able to get around house well, sliding off chairs and bed to get down.  Not able to take care of self.  requesting rehab at this time. Admits tot decreased PO intake.  Denies head injury, LOC, headache, chest pain, abd pain, N/V/D, fever, chills, abd pain.  No further complaints at this time.

## 2019-06-26 NOTE — H&P ADULT - NSHPPHYSICALEXAM_GEN_ALL_CORE
GENERAL: NAD, lying in bed comfortably, mildly lethargic but arounsable and answer question appropriately  HEAD: Atraumatic, Normocephalic  EYES: EOMI, PERRLA, Conjunctiva mildly pale and erythematous cornea on left eye  ENT: Moist mucous membranes, wears dentures  NECK: Supple, No JVD  CHEST/LUNG: Clear to auscultation bilaterally; No rales, rhonchi, wheezing, or rubs. Unlabored respirations  HEART: Regular rate and rhythm; No murmurs, rubs, or gallops  ABDOMEN: Bowel sounds present; Soft, Nontender, Nondistended. No hepatomegaly  EXTREMITIES: 2+ petal edema on left LE; s/p right BKA with well-healed stump with staples, no erythema, no drainage  NERVOUS SYSTEM:  Alert & Oriented X3, speech clear. paraplegic, with diminished sensation on LE bilaterally, 5/5 strength on UE  SKIN: Two stage 4 sacral ulcers with clean base, no erythema, no drainage

## 2019-06-26 NOTE — H&P ADULT - NSHPREVIEWOFSYSTEMS_GEN_ALL_CORE
CONSTITUTIONAL: No weakness, fevers or chills  RESPIRATORY: No cough, wheezing, hemoptysis; No shortness of breath  CARDIOVASCULAR: No chest pain or palpitations  GASTROINTESTINAL: No abdominal or epigastric pain. No nausea, vomiting, or hematemesis; No diarrhea or constipation. No melena or hematochezia.  GENITOURINARY: No dysuria, frequency or hematuria  NEUROLOGICAL: No numbness or weakness  SKIN: No itching, rashes CONSTITUTIONAL: Reports generalized weakness; no fevers or chills  RESPIRATORY: No cough, wheezing, hemoptysis; No shortness of breath  CARDIOVASCULAR: No chest pain or palpitations  GASTROINTESTINAL: No abdominal or epigastric pain. No nausea, vomiting, or hematemesis; No diarrhea or constipation. No melena or hematochezia.  GENITOURINARY: No dysuria, frequency or hematuria  NEUROLOGICAL: Paraplegic  EXTREMITIES: right BKA  SKIN: positive sacral wound CONSTITUTIONAL: Reports generalized weakness; no fevers or chills  RESPIRATORY: No cough, wheezing, hemoptysis; No shortness of breath  CARDIOVASCULAR: No chest pain or palpitations  GASTROINTESTINAL: No abdominal or epigastric pain. No nausea, vomiting, or hematemesis; No diarrhea or constipation. No melena or hematochezia.  GENITOURINARY: No dysuria, frequency or hematuria  NEUROLOGICAL: Paraplegic, diminished sensation on LE bilaterally  EXTREMITIES: right BKA  SKIN: positive sacral wound

## 2019-06-26 NOTE — ED PROVIDER NOTE - ATTENDING CONTRIBUTION TO CARE
pt s/p bka here at [place] [facility], dc 5 days ago, to home, sts while being transferred he fell, hurt his right shoulder and back. no head inj, neck inj. no cp, sob, ab pain, n, v, fever or chills.   pt in nad, ncat, perrl, eomi, neck sup, spine nt, ctab, chest nt. tender right shoulder but FROM. hips FROM, non tender. right BKA.   will get imaging, labs.

## 2019-06-26 NOTE — ED ADULT NURSE REASSESSMENT NOTE - NS ED NURSE REASSESS COMMENT FT1
patient has multiple pressure ulcers:    unstageable on coccyx  RIGHT IT unstageable  Left IT unstageable    wound care provided, dry and intact. patient t/p @q. Statement Selected

## 2019-06-26 NOTE — ED PROVIDER NOTE - PHYSICAL EXAMINATION
CONST: Well appearing in NAD  HEAD: NC/AT  EYES: Sclera and conjunctiva clear.  NECK: Non-tender, no midline C/T/L tenderness, supple  CARD: Normal S1 S2; Normal rate and rhythm  RESP: Equal BS B/L, No wheezes, rhonchi or rales. No distress  GI: Soft, non-tender, non-distended, brown stool  MS: R BKA with healing surgical incision, intact staples, healing wound left heel, Normal ROM  upper extremities, no TTP of extremities, radial pulses 2+ bilaterally  SKIN: healing stage 2 and 3 ulcers of bilateral buttock and sacrum  NEURO: A&Ox3, No focal deficits. Strength 5/5 with no sensory deficits

## 2019-06-26 NOTE — H&P ADULT - ATTENDING COMMENTS
The patient was seen and examined at bedside.   Agree with a/p above by resident.    Hyperkalemia >> S/P IV insulin/ D50  >> f/u repeat BMP @ 4pm.      Add kayexalate .      Will trend BMP anyways for SILVINA.    Will follow.

## 2019-06-26 NOTE — ED PROVIDER NOTE - PMH
Anemia    Diabetes    Hypertension    Lumbar compression fracture    PAD (peripheral artery disease)    Pressure ulcer    Suprapubic catheter

## 2019-06-26 NOTE — ED ADULT NURSE NOTE - OBJECTIVE STATEMENT
patient c/o of multiple falls at home requesting rehab. patient has multiple pressure ulcers.\    unstageable of coccyx  RIGHT IT unstageable  Left IT unstageable

## 2019-06-26 NOTE — H&P ADULT - NSICDXPASTSURGICALHX_GEN_ALL_CORE_FT
PAST SURGICAL HISTORY:  H/O hernia repair     S/P below knee amputation, right     S/P debridement     Toe amputation status, right

## 2019-06-26 NOTE — ED PROVIDER NOTE - CARE PLAN
Principal Discharge DX:	Anemia  Secondary Diagnosis:	Ambulatory dysfunction  Secondary Diagnosis:	SILVINA (acute kidney injury)  Secondary Diagnosis:	Chronic osteomyelitis  Secondary Diagnosis:	Decubitus ulcer

## 2019-06-26 NOTE — ED PROVIDER NOTE - CLINICAL SUMMARY MEDICAL DECISION MAKING FREE TEXT BOX
pt s/p fall, imaging done, labs done. unable to take care of himself post op. admitted to hosp. acute on crf.

## 2019-06-26 NOTE — ED ADULT NURSE NOTE - NSIMPLEMENTINTERV_GEN_ALL_ED
Implemented All Universal Safety Interventions:  [place] to call system. Call bell, personal items and telephone within reach. Instruct patient to call for assistance. Room bathroom lighting operational. Non-slip footwear when patient is off stretcher. Physically safe environment: no spills, clutter or unnecessary equipment. Stretcher in lowest position, wheels locked, appropriate side rails in place.

## 2019-06-26 NOTE — ED ADULT TRIAGE NOTE - CHIEF COMPLAINT QUOTE
patient paralyzed  recent right bka. lives at home alone - fell while transfering from bed to wheel chair. injuring lower back and shoulder no head injury no loc

## 2019-06-26 NOTE — H&P ADULT - NSHPSOCIALHISTORY_GEN_ALL_CORE
Daily smoker for 40 years, intermittent marijuana use; Denies alcohol use      Live alone; functional paraplegic, bedbound and wheelchair bound

## 2019-06-26 NOTE — H&P ADULT - NSHPLABSRESULTS_GEN_ALL_CORE
6.0    11.51 )-----------( 406      ( 26 Jun 2019 15:43 )             19.9   06-26    139  |  107  |  53<H>  ----------------------------<  81  5.3<H>   |  19  |  4.8<HH>    Ca    7.7<L>      26 Jun 2019 15:43    TPro  5.2<L>  /  Alb  1.6<L>  /  TBili  <0.2  /  DBili  x   /  AST  12  /  ALT  7   /  AlkPhos  177<H>  06-26      < from: Xray Pelvis AP only (06.26.19 @ 17:22) >    Diffuse osteopenia.    No definite acute displaced fracture or dislocation.    Again seen is chronic deformities of the bilateral ischial tuberosities,   consistent with chronic osteomyelitis from decubitus ulcer.    Again seen is chronic deformity of the left greater trochanter with   osseous erosion, consistent with chronic osteomyelitis from decubitus   ulcer.    There are degenerative changes of the bilateral hips, sacroiliac joints.    Partially imaged are lumbar spinal hardware.    < end of copied text >      < from: Transthoracic Echocardiogram (06.04.19 @ 17:48) >     1. Left ventricular ejection fraction, by visual estimation, is 65 to   70%.   2. Technically good study.   3. Normal left ventricular size and wall thicknesses, with normal   systolic and diastolic function.   4. The mean global longitudinal strain by speckle tracking is -20.2%   which is normal.   5. Moderate mitral annular calcification.   6. LA volume Index is 31.5 ml/m² ml/m2.    < end of copied text >

## 2019-06-27 NOTE — PHYSICAL THERAPY INITIAL EVALUATION ADULT - IMPAIRMENTS FOUND, PT EVAL
gait, locomotion, and balance/integumentary integrity/joint integrity and mobility/muscle strength/gross motor

## 2019-06-27 NOTE — PHYSICAL THERAPY INITIAL EVALUATION ADULT - ADDITIONAL COMMENTS
Pt reported he is w/c bound for many years/paraplegic, transfer bed to w/c independly prior to recent hospitlization, however had been having difficulty since last admission to hospital s/p BKA. Had 3 falls at home since d/c from [facility] recently.

## 2019-06-27 NOTE — CONSULT NOTE ADULT - SUBJECTIVE AND OBJECTIVE BOX
Patient is a 58y old  Male who presents with a chief complaint of Acute kidney injury, anemia (2019 20:12)    HPI:  58 years old male with reported PMH of HTN, chronic back pain, chronic anemia, L1 burst fracture with paraplegia since  (bedbound and wheelchair bound), PAD s/p recent BKA on , neurogenic bladder with suprapubic catheter (recently changed 3 days ago) presented with weakness and generalized pain. History was also obtained from patient's mother at bedside. Patient was found to be on the floor this morning by his mother. Patient states that he was trying to transfer himself from bed to his wheelchair but he fell on the floor. Patient denies head trauma or LOC. His mother later found him and called the ambulance. Patient was recently admitted to [facility] for RLE gangrene and had BKA done on . Patient was discharged on  to SNF initially but patient refused, so he was discharged home instead. Patient has fallen at home 3 times since his discharge. Patient reports weakness, bilateral shoulder pain, and chronic low back pain. Patient denies fever/chills, cough, chest pain, shortness of breath, abdominal pain, nausea/vomiting, diarrhea/constipation. Patient was found to be anemic with hemoglobin of 6 (baseline 8-9) and elevated creatinine of 4.8 (baseline 0.8 in 2018, 3.8 in 2019).    In ED: 1 unit PRBC, 1L NS (2019 20:12)      PAST MEDICAL & SURGICAL HISTORY:  Anemia  PAD (peripheral artery disease)  Hypertension  Suprapubic catheter  Pressure ulcer  Diabetes  Lumbar compression fracture  S/P below knee amputation, right  S/P debridement  Toe amputation status, right  H/O hernia repair      Hospital Course: cute on chronic normocytic anemia  - Hgb 6 on admission, baseline 8-9  - No signs of active bleeding, rectal exam shows brown stool  - s/p 1 units of PRBC, will follow up post-transfusion CBC  - Keep two 18-gauge IVs  - Active T&S. keep hgb above 7  - CBC daily  - Check iron studies, B12, folate    # Acute kidney injury on CKD 4  - Possibly due to pre-renal 2/2 dehydration and anima  - Kidney US on  shows no hydronephrosis, bilateral echogenic kidney compatible with medical renal disease  - Having adequate urine output from suprapubic catheter  - Check urine lytes, UA  - Encourage oral hydration  - BMP daily    # HTN  - Well- controlled  - Monitor off medication for now    # DM  - Was on Januvia but became hypoglycemic  - Check A1C    # Chronic back pain with functional paraplegia  - Continue methadone 80mg Q6hrs (approved by pain management in last visit), Oxycodone 30mg Q6hr PRN  - Renally dosed gabapentin (200mg QD)  - PT/Rehab (patient refused SNF but now agrees with rehab)    # Anxiety  - Continue Valium and Ativan      DVT ppx: Heparin subQ  GI ppx: Not indicated    TODAY'S SUBJECTIVE & REVIEW OF SYMPTOMS:     Constitutional WNL   Cardio WNL   Resp WNL   GI WNL  Heme WNL  Endo WNL  Skin WNL  MSK WNL  Neuro WNL  Cognitive WNL  Psych WNL      MEDICATIONS  (STANDING):  ALPRAZolam 2 milliGRAM(s) Oral at bedtime  chlorhexidine 4% Liquid 1 Application(s) Topical <User Schedule>  Dakins Solution - 1/2 Strength 1 Application(s) Topical two times a day  diazepam    Tablet 10 milliGRAM(s) Oral two times a day  gabapentin 200 milliGRAM(s) Oral daily  heparin  Injectable 5000 Unit(s) SubCutaneous every 8 hours  methadone    Tablet 80 milliGRAM(s) Oral every 6 hours  sodium chloride 0.9%. 1000 milliLiter(s) (100 mL/Hr) IV Continuous <Continuous>  testosterone 1% Gel 50 milliGRAM(s) Topical daily    MEDICATIONS  (PRN):  oxyCODONE    IR 30 milliGRAM(s) Oral every 6 hours PRN Severe Pain (7 - 10)      FAMILY HISTORY:  No pertinent family history in first degree relatives      Allergies    sulfamethizole (Unknown)    Intolerances        SOCIAL HISTORY:    [    ] Etoh  [    ] Smoking  [    ] Substance abuse     Home Environment:  [    ] Home Alone  [  x  ] Lives with Family  [    ] Home Health Aid    Dwelling:  [    ] Apartment  [  x  ] Private House [facility]  [    ] Adult Home  [    ] Skilled Nursing Facility      [    ] Short Term  [    ] Long Term  [    ] Stairs                           [    ] Elevator     FUNCTIONAL STATUS PTA: (Check all that apply)  Ambulation: [     ]Independent    [    ] Dependent     [ x   ] Non-Ambulatory  Assistive Device: [    ] SA Cane  [    ]  Q Cane  [    ] Walker  [    x]  Wheelchair  ADL : [  x  ] Independent  [    ]  Dependent   ABLE TO SELF TRANSFER PRIOR TO BKA    Vital Signs Last 24 Hrs  T(C): 35.8 (2019 05:25), Max: 36.6 (2019 04:10)  T(F): 96.5 (2019 05:25), Max: 97.8 (2019 04:10)  HR: 78 (2019 08:46) (72 - 87)  BP: 109/64 (2019 08:46) (103/61 - 115/67)  BP(mean): --  RR: 18 (2019 05:25) (18 - 20)  SpO2: 96% (2019 20:10) (96% - 97%)      PHYSICAL EXAM: Alert & Oriented X3  GENERAL: NAD, well-groomed, well-developed  HEAD:  Atraumatic, Normocephalic  EYES: EOMI, PERRLA, conjunctiva and sclera clear  NECK: Supple  CHEST/LUNG: Clear bilaterally  HEART: Regular rate and rhythm  ABDOMEN: Soft, Nontender, Nondistended; Bowel sounds present + suprapubic tube  EXTREMITIES:  R BKA with devora incision intact- Abrasion over miriam patella tendons , 1+ edema LLE    NERVOUS SYSTEM:  Cranial Nerves 2-12 intact [ x   ] Abnormal  [    ]  ROM: WFL all extremities [   x ]  Abnormal [     ]  Motor Strength: WFL all extremities  [    ]  Abnormal [ x   ]3-/5 R hip/ knee, #-/5 L hip / knee O/5 L foot  Sensation: intact to light touch [    ] Abnormal [  x  ]diminished  BLES      FUNCTIONAL STATUS:  Bed Mobility: [   ]  Independent [    ]  Supervision [  x  ]  Needs Assistance [  ]  N/A  Transfers: [    ]  Independent [    ]  Supervision [  x  ]  Needs Assistance [    ]  N/A    Ambulation:  [    ]  Independent [    ]  Supervision [    ]  Needs Assistance [  x  ]  N/A   ADL:  [    ]   Independent [   x ] Requires Assistance [    ] N/A       LABS:                        8.3    8.81  )-----------( 450      ( 2019 06:35 )             27.-    140  |  107  |  54<H>  ----------------------------<  60<L>  6.1<HH>   |  19  |  4.8<HH>    Ca    7.9<L>      2019 06:35  Mg     1.5     06-27    TPro  5.5<L>  /  Alb  1.7<L>  /  TBili  0.2  /  DBili  x   /  AST  13  /  ALT  7   /  AlkPhos  179<H>        Urinalysis Basic - ( 2019 10:20 )    Color: Yellow / Appearance: Cloudy / S.020 / pH: x  Gluc: x / Ketone: Negative  / Bili: Negative / Urobili: 0.2   Blood: x / Protein: >=300 / Nitrite: Negative   Leuk Esterase: Small / RBC: 11-25 /HPF / WBC 6-10 /HPF   Sq Epi: x / Non Sq Epi: Moderate /HPF / Bacteria: Moderate /HPF        RADIOLOGY & ADDITIONAL STUDIES:

## 2019-06-27 NOTE — PHYSICAL THERAPY INITIAL EVALUATION ADULT - CRITERIA FOR SKILLED THERAPEUTIC INTERVENTIONS
functional limitations in following categories/risk reduction/prevention/therapy frequency/rehab potential/predicted duration of therapy intervention/impairments found

## 2019-06-27 NOTE — PHYSICAL THERAPY INITIAL EVALUATION ADULT - GENERAL OBSERVATIONS, REHAB EVAL
9:55-9:45. Pt encountered semifowler in bed in NAD, + IV R UE, + ACE wrap R LE/stump, +dressing/gauze to L LE, +suprapubic brown catheter, reported he didn't sleep well at night eyes were closing throughout PT Eval. Pt agreeable to PT and OOB.

## 2019-06-27 NOTE — PHYSICAL THERAPY INITIAL EVALUATION ADULT - PERTINENT HX OF CURRENT PROBLEM, REHAB EVAL
Acute kidney injury, anemia Pt presented with weakness and generalized pain. History was also obtained from patient's mother at bedside. Patient was found to be on the floor this morning by his mother. Patient states that he was trying to transfer himself from bed to his wheelchair but he fell on the floor. Found to have Acute kidney injury, anemia

## 2019-06-27 NOTE — CONSULT NOTE ADULT - ASSESSMENT
IMPRESSION: Rehab of Debilitation R BKA Hx Paraparesis    PRECAUTIONS: [    ] Cardiac  [    ] Respiratory  [    ] Seizures [    ] Contact Isolation  [    ] Droplet Isolation  [    ] Other    Weight Bearing Status:     RECOMMENDATION:    Out of Bed to Chair     DVT/Decubiti Prophylaxis    REHAB PLAN:     [   x  ] Bedside P/T 3-5 times a week   [     ] Bedside O/T  2-3 times a week   [     ] No Rehab Therapy Indicated   [     ]  Speech Therapy   Conditioning/ROM                                 ADL  Bed Mobility                                            Conditioning/ROM  Transfers                                                  Bed Mobility  Sitting /Standing Balance                      Transfers                                        Gait Training                                            Sitting/Standing Balance  Stair Training [   ]Applicable                 Home equipment Eval                                                                     Splinting  [   ] Only      GOALS:   ADL   [  x  ]   Independent         Transfers  [ x   ] Independent            Ambulation  [     ] Independent     [     ] With device                            [    ]  CG                                               [    ]  CG                                                    [     ] CG                            [    ] Min A                                          [    ] Min A                                                [     ] Min  A          DISCHARGE PLAN:   [     ]  Good candidate for Intensive Rehabilitation/Hospital based                                             Will tolerate 3hrs Intensive Rehab Daily                                       [    x  ]  Short Term Rehab in Skilled Nursing Facility                                       [      ]  Home with Outpatient or  services                                         [      ]  Possible Candidate for Intensive Hospital based Rehab

## 2019-06-28 NOTE — DIETITIAN INITIAL EVALUATION ADULT. - RD TO REMAIN AVAILABLE
RD to monitor energy intake, diet order, body comp, NFPF, renal/glucose profile. Not at risk f/u 7 days/yes

## 2019-06-28 NOTE — PROGRESS NOTE ADULT - ASSESSMENT
58 years old male with reported PMH of HTN, chronic back pain, chronic anemia, L1 burst fracture with paraplegia since 1986 (bedbound and wheelchair bound), PAD s/p recent BKA on 6/4, neurogenic bladder with suprapubic catheter presented with weakness and generalized pain    Lethargy, rapid response last night; resolved  - Avoid another benzo  - Patient was Istopped: taking 320 of methadone total, taking oxycodone 240 total a day, taking valium 10 bid. (Check chart)    Hyperkalemia; s/p Insulin 10 and Kayexelate, repeat Insulin 10 again, f/u BMP 4pm and 8pm.    SILVINA on CKD 4  - Possibly due to pre-renal 2/2 dehydration and anima  - Kidney US on 6/4 shows no hydronephrosis, bilateral echogenic kidney compatible with medical renal disease  - Having adequate urine output from suprapubic catheter  - Tolerating oral intake  - Encourage oral hydration  - BMP daily    Stage III-IV decubitus; wet packing + saline and hydrogel    Acute on chronic normocytic anemia; resolved  - Hgb 6 on admission, baseline 8-9  - No signs of active bleeding, rectal exam shows brown stool  - s/p 1 units of PRBC, current Hb stable  - Keep two 18-gauge IVs  - Active T&S. keep Hb > 7  - CBC daily    Chronic back pain with functional paraplegia  - Continue methadone 80mg Q6hrs (approved by pain management in last visit), Oxycodone 30mg Q6hr PRN  - Renally dosed gabapentin (200mg QD)  - PT/Rehab (patient refused SNF but now agrees with rehab)    HTN; well- controlled. Monitor off medication for now  DM; was on Januvia but became hypoglycemic. F/u HbA1c  Anxiety; valium PRN instead of around the clock  DVT ppx: Heparin subQ  GI ppx: Not indicated  Diet: DASH  Activity: increase as tolerated  Code status: full code  Dispo: acute, pending PT/Rehab consult 58 years old male with reported PMH of HTN, chronic back pain, chronic anemia, L1 burst fracture with paraplegia since 1986 (bedbound and wheelchair bound), PAD s/p recent BKA on 6/4, neurogenic bladder with suprapubic catheter presented with weakness and generalized pain    Lethargy, rapid response last night; resolved  - Avoid another benzo  - Patient was Istopped: taking 320 of methadone total, taking oxycodone 240 total a day, taking valium 10 bid. (Check chart)    Hyperkalemia; s/p Insulin 10 and Kayexelate, repeat Insulin 10 again, f/u BMP 4pm and 8pm. Avoid hypoglycemia!    SILVINA on CKD 4  - Possibly due to pre-renal 2/2 dehydration and anima  - Kidney US on 6/4 shows no hydronephrosis, bilateral echogenic kidney compatible with medical renal disease  - Having adequate urine output from suprapubic catheter  - Tolerating oral intake  - Encourage oral hydration  - BMP daily    Stage III-IV decubitus; wet packing + saline and hydrogel    Acute on chronic normocytic anemia; resolved  - Hgb 6 on admission, baseline 8-9  - No signs of active bleeding, rectal exam shows brown stool  - s/p 1 units of PRBC, current Hb stable  - Keep two 18-gauge IVs  - Active T&S. keep Hb > 7  - CBC daily    Chronic back pain with functional paraplegia  - Continue methadone 80mg Q6hrs (approved by pain management in last visit), Oxycodone 30mg Q6hr PRN  - Renally dosed gabapentin (200mg QD)  - PT/Rehab (patient refused SNF but now agrees with rehab)    HTN; well- controlled. Monitor off medication for now  DM; was on Januvia but became hypoglycemic. F/u HbA1c  Anxiety; valium PRN instead of around the clock  DVT ppx: Heparin subQ  GI ppx: Not indicated  Diet: DASH  Activity: increase as tolerated  Code status: full code  Dispo: acute, pending PT/Rehab consult

## 2019-06-28 NOTE — PROGRESS NOTE ADULT - SUBJECTIVE AND OBJECTIVE BOX
LENGTH OF HOSPITAL STAY: 2d    CHIEF COMPLAINT:   Patient is a 58y old  Male who presents with a chief complaint of Acute kidney injury, anemia (2019 12:21)      HISTORY OF PRESENTING ILLNESS:    HPI:  58 years old male with reported PMH of HTN, chronic back pain, chronic anemia, L1 burst fracture with paraplegia since  (bedbound and wheelchair bound), PAD s/p recent BKA on , neurogenic bladder with suprapubic catheter (recently changed 3 days ago) presented with weakness and generalized pain. History was also obtained from patient's mother at bedside. Patient was found to be on the floor this morning by his mother. Patient states that he was trying to transfer himself from bed to his wheelchair but he fell on the floor. Patient denies head trauma or LOC. His mother later found him and called the ambulance. Patient was recently admitted to [facility] for RLE gangrene and had BKA done on . Patient was discharged on  to SNF initially but patient refused, so he was discharged home instead. Patient has fallen at home 3 times since his discharge. Patient reports weakness, bilateral shoulder pain, and chronic low back pain. Patient denies fever/chills, cough, chest pain, shortness of breath, abdominal pain, nausea/vomiting, diarrhea/constipation. Patient was found to be anemic with hemoglobin of 6 (baseline 8-9) and elevated creatinine of 4.8 (baseline 0.8 in 2018, 3.8 in 2019).    In ED: 1 unit PRBC, 1L NS (2019 20:12)    PAST MEDICAL & SURGICAL HISTORY  PAST MEDICAL & SURGICAL HISTORY:  Anemia  PAD (peripheral artery disease)  Hypertension  Suprapubic catheter  Pressure ulcer  Diabetes  Lumbar compression fracture  S/P below knee amputation, right  S/P debridement  Toe amputation status, right  H/O hernia repair    SOCIAL HISTORY:    ALLERGIES:  sulfamethizole (Unknown)    MEDICATIONS:  STANDING MEDICATIONS  chlorhexidine 4% Liquid 1 Application(s) Topical <User Schedule>  Dakins Solution - 1/2 Strength 1 Application(s) Topical two times a day  dextrose 50% Injectable 50 milliLiter(s) IV Push once  diazepam    Tablet 10 milliGRAM(s) Oral two times a day  gabapentin 200 milliGRAM(s) Oral daily  heparin  Injectable 5000 Unit(s) SubCutaneous every 8 hours  insulin regular  human recombinant. 10 Unit(s) IV Push once  methadone    Tablet 80 milliGRAM(s) Oral every 6 hours  sodium chloride 0.9%. 1000 milliLiter(s) IV Continuous <Continuous>  testosterone 1% Gel 50 milliGRAM(s) Topical daily    PRN MEDICATIONS    VITALS:   Vital Signs Last 24 Hrs  T(C): 36.2 (2019 13:27), Max: 36.2 (2019 13:27)  T(F): 97.2 (2019 13:27), Max: 97.2 (2019 13:27)  HR: 80 (2019 13:27) (80 - 95)  BP: 86/52 (2019 13:27) (86/52 - 119/69)  BP(mean): --  RR: 18 (2019 13:27) (17 - 18)  SpO2: --    Gen: NAD, speaking in full sentences but in a lethargic way  HEENT: NCAT, EOMI, constricted pupils  Cardio: S1 S2 RRR  Chest: CTAB GBAE  Abd: +BS, soft, non tender  Ext: Right BKA  Neuro: AAOx3, slurry speech    LABS:                        8.4    9.98  )-----------( 471      ( 2019 05:53 )             28.7         141  |  111<H>  |  54<H>  ----------------------------<  39<LL>  5.5<H>   |  18  |  5.1<HH>    Ca    8.0<L>      2019 11:26  Mg     1.8         TPro  5.5<L>  /  Alb  1.7<L>  /  TBili  0.2  /  DBili  x   /  AST  13  /  ALT  7   /  AlkPhos  179<H>      Iron with Total Binding Capacity in AM (19 @ 06:46)    Iron - Total Binding Capacity.: 77 ug/dL    % Saturation, Iron: 51 %    Iron Total, Serum: 39 ug/dL    Unsaturated Iron Binding Capacity: 38 ug/dL      Urinalysis Basic - ( 2019 10:20 )    Color: Yellow / Appearance: Cloudy / S.020 / pH: x  Gluc: x / Ketone: Negative  / Bili: Negative / Urobili: 0.2   Blood: x / Protein: >=300 / Nitrite: Negative   Leuk Esterase: Small / RBC: 11-25 /HPF / WBC 6-10 /HPF   Sq Epi: x / Non Sq Epi: Moderate /HPF / Bacteria: Moderate /HPF      ABG - ( 2019 23:42 )  pH, Arterial: 7.20  pH, Blood: x     /  pCO2: 46    /  pO2: 72    / HCO3: 18    / Base Excess: -9.8  /  SaO2: 93        CARDIAC MARKERS ( 2019 23:33 )  x     / x     / 37 U/L / x     / x          RADIOLOGY:

## 2019-06-28 NOTE — DIETITIAN INITIAL EVALUATION ADULT. - OTHER INFO
Adm: Acute kidney injury, anemia. Acute on chronic normocytic anemia. Acute kidney injury on CKD 4 with persistent Hyperkalemia. monitor K level closely, if persistent > might need RRT. renal consulted. Chronic back pain with functional paraplegia.

## 2019-06-28 NOTE — PROGRESS NOTE ADULT - ASSESSMENT
58 years old male with reported PMH of HTN, chronic back pain, chronic anemia, L1 burst fracture with paraplegia since 1986 (bedbound and wheelchair bound), PAD s/p recent BKA on 6/4, neurogenic bladder with suprapubic catheter presented with weakness and generalized pain    # Acute on chronic normocytic anemia  - Hgb 6 on admission, baseline 8-9  - No signs of active bleeding, rectal exam shows brown stool  - s/p 1 units of PRBC, > HB 8.4 today.   - Keep two 18-gauge IVs  - Active T&S. keep hgb above 7    # Acute kidney injury on CKD 4 with persistent Hyperkalemia   - Possibly due to pre-renal 2/2 dehydration > On IV hydration  - Kidney US on 6/4 shows no hydronephrosis, bilateral echogenic kidney compatible with medical renal disease  - Having adequate urine output from suprapubic catheter  - Encourage oral hydration  - monitor K level closely, if persistent > might need RRT.   - Renal eval.     # HTN  - Well- controlled  - Monitor off medication for now    # DM  - Was on Januvia but became hypoglycemic  - Check A1C    # Chronic back pain with functional paraplegia  - Continue methadone 80mg Q6hrs. I stopped.   - Renally dosed gabapentin (200mg QD)  - PT/Rehab (patient refused SNF but now agrees with rehab)    # Anxiety  - Continue Valium , hold on xanax for increased lethargy.       DVT ppx: Heparin subQ  GI ppx: Not indicated  Diet: DASH  Activity: increase as tolerated  Code status: full code  Dispo: acute, pending PT/Rehab consult      #Progress Note Handoff  Pending (specify):  Clinical improvement from SILVINA /hyperkalemia  Family discussion: D/W patient and the mother at bedside.   Disposition: Possible SNF

## 2019-06-28 NOTE — PROGRESS NOTE ADULT - SUBJECTIVE AND OBJECTIVE BOX
MARGE BELLA  58y  Male      Patient is a 58y old  Male who presents with a chief complaint of Acute kidney injury, anemia (2019 13:01)      INTERVAL HPI/OVERNIGHT EVENTS:      ******************************* REVIEW OF SYSTEMS:**********************************************    Resting comfortably. Found lethargic earlier./   All other review of systems negative    *********************** VITALS ******************************************    T(F): 95.9 (19 @ 06:07)  HR: 80 (19 @ 06:07) (76 - 95)  BP: 102/67 (19 @ 06:07) (98/56 - 119/69)  RR: 17 (19 @ 06:07) (17 - 18)  SpO2: 98% (19 @ 13:14) (98% - 98%)    19 @ 07:01  -  19 @ 07:00  --------------------------------------------------------  IN: 1200 mL / OUT: 1050 mL / NET: 150 mL    19 @ 07:01  -  19 @ 12:21  --------------------------------------------------------  IN: 300 mL / OUT: 0 mL / NET: 300 mL            19 @ 07:01  -  19 @ 07:00  --------------------------------------------------------  IN: 1200 mL / OUT: 1050 mL / NET: 150 mL    19 @ 07:01  -  19 @ 12:21  --------------------------------------------------------  IN: 300 mL / OUT: 0 mL / NET: 300 mL        ******************************** PHYSICAL EXAM:**************************************************  GENERAL: NAD    PSYCH: no agitation, baseline mentation  HEENT:     NERVOUS SYSTEM:  Alert & Oriented X3,  PULMONARY: DESIRAE, CTA    CARDIOVASCULAR: S1S2 RRR    GI: Soft, NT, ND; BS present.    EXTREMITIES:  LLE edema, right BKA     LYMPH: No lymphadenopathy noted    SKIN: No rashes or lesions, Tattoos UE B/L    ******************************************************************************************    Consultant(s) Notes Reviewed:  [x ] YES  [ ] NO    Discussed with Consultants/Other Providers [ x] YES     **************************** LABS *******************************************************                          8.4    9.98  )-----------( 471      ( 2019 05:53 )             28.7         139  |  109  |  54<H>  ----------------------------<  54<L>  6.0<HH>   |  17  |  4.9<HH>    Ca    8.0<L>      2019 05:53  Mg     1.5         TPro  5.5<L>  /  Alb  1.7<L>  /  TBili  0.2  /  DBili  x   /  AST  13  /  ALT  7   /  AlkPhos  179<H>      ABG - ( 2019 23:42 )  pH, Arterial: 7.20  pH, Blood: x     /  pCO2: 46    /  pO2: 72    / HCO3: 18    / Base Excess: -9.8  /  SaO2: 93                Urinalysis Basic - ( 2019 10:20 )    Color: Yellow / Appearance: Cloudy / S.020 / pH: x  Gluc: x / Ketone: Negative  / Bili: Negative / Urobili: 0.2   Blood: x / Protein: >=300 / Nitrite: Negative   Leuk Esterase: Small / RBC: 11-25 /HPF / WBC 6-10 /HPF   Sq Epi: x / Non Sq Epi: Moderate /HPF / Bacteria: Moderate /HPF        Lactate Trend    CARDIAC MARKERS ( 2019 23:33 )  x     / x     / 37 U/L / x     / x          CAPILLARY BLOOD GLUCOSE      POCT Blood Glucose.: 76 mg/dL (2019 21:59)          **************************Active Medications *******************************************  sulfamethizole (Unknown)      chlorhexidine 4% Liquid 1 Application(s) Topical <User Schedule>  Dakins Solution - 1/2 Strength 1 Application(s) Topical two times a day  diazepam    Tablet 10 milliGRAM(s) Oral two times a day  gabapentin 200 milliGRAM(s) Oral daily  heparin  Injectable 5000 Unit(s) SubCutaneous every 8 hours  methadone    Tablet 80 milliGRAM(s) Oral every 6 hours  sodium chloride 0.9%. 1000 milliLiter(s) IV Continuous <Continuous>  testosterone 1% Gel 50 milliGRAM(s) Topical daily      ***************************************************  RADIOLOGY & ADDITIONAL TESTS:    Imaging Personally Reviewed:  [ ] YES  [ ] NO    HEALTH ISSUES - PROBLEM Dx:

## 2019-06-28 NOTE — DIETITIAN INITIAL EVALUATION ADULT. - ENERGY NEEDS
calorie needs: 1993 - 2159 kcals/day (MSJ x 1.2 - 1.3 AF for PU)  protein needs: 68 - 84  gms/day (0.9 - 1.1 gm/kg for PU and renal fxn) will adjust prn   fluid needs: 1ml/kcal or per LIP

## 2019-06-28 NOTE — DIETITIAN INITIAL EVALUATION ADULT. - PHYSICAL APPEARANCE
well nourished/BS 14. Stage 3 sacrum. DTI on L lateral foot. DTI L malleous. BMI 22.6 adjusted for R BKA, IBW: 84kg

## 2019-06-28 NOTE — CHART NOTE - NSCHARTNOTEFT_GEN_A_CORE
Notified by RN for patient lethargic and STAT lab called for K 6.9 slightly hemolyzed    Patient was seen and examined at bedside and noted lethargy. Patient was snoring and responsive to verbal commands as well as physical stimuli but would not remain awake. Chart reviewed and noted patient receiving Methadone 80 q6 and Valium 10mg q12. Senior Resident notified and an ABG was ordered    ABG - ( 27 Jun 2019 23:42 )  pH, Arterial: 7.20  pH, Blood: x     /  pCO2: 46    /  pO2: 72    / HCO3: 18    / Base Excess: -9.8  /  SaO2: 93        ABG also noted K 5.5 which shows downtrending and no need for resticking the patient overnight     Patient independently assessed by Senior resident with MAR and Unit Senior. Patient at this point was more alert and conversive. He was breathing fine on his own maintaining a saturation in the 90s. Physical exam with no noted changes from admission. Cardiac RRR, S1S2 with no m/r/g. Chest CTA with no w/r/r.     Impression:   Mixed acidosis secondary to lethargy from combined     Plan:   - Hold overnight methadone  - Hold bedtime Xanax  - Given patient conversive and maintaining awake; no need for narcan at this time.   - Continue to monitor and day team to reassess medications.

## 2019-06-29 NOTE — PROVIDER CONTACT NOTE (OTHER) - ACTION/TREATMENT ORDERED:
IOC will contact urology on whether larger suprapubic cath should be inserted. Pt's mother stated that Pt has lost 100 lbs since initial insertion.

## 2019-06-29 NOTE — CONSULT NOTE ADULT - SUBJECTIVE AND OBJECTIVE BOX
Patient is a 58y old  Male who presents with a chief complaint of Acute kidney injury, anemia (29 Jun 2019 10:08)      HPI:  58 years old male with reported PMH of HTN, chronic back pain, chronic anemia, L1 burst fracture with paraplegia since 1986 (bedbound and wheelchair bound), PAD s/p recent BKA on 6/4, neurogenic bladder with suprapubic catheter (recently changed 3 days ago) presented with weakness and generalized pain. History was also obtained from patient's mother at bedside. Patient was found to be on the floor this morning by his mother. Patient states that he was trying to transfer himself from bed to his wheelchair but he fell on the floor. Patient denies head trauma or LOC. His mother later found him and called the ambulance. Patient was recently admitted to [facility] for RLE gangrene and had BKA done on 6/4. Patient was discharged on 6/21 to SNF initially but patient refused, so he was discharged home instead. Patient has fallen at home 3 times since his discharge. Patient reports weakness, bilateral shoulder pain, and chronic low back pain. Patient denies fever/chills, cough, chest pain, shortness of breath, abdominal pain, nausea/vomiting, diarrhea/constipation. Patient was found to be anemic with hemoglobin of 6 (baseline 8-9) and elevated creatinine of 4.8 (baseline 0.8 in 2018, 3.8 in 2019).    In ED: 1 unit PRBC, 1L NS (26 Jun 2019 20:12)      :  58 y.o M with PMH of paraplegia, bedbound, chronic SPT.  Urology called to evaluate Pt. for leaking SPT. Pt. seen and examined at bedside, SPT in place draining yellow urine. Pt. states SPT changed 2 days prior admission ( 6/26/18) Pt. used to F/U with Dr. Wilkes, refusing his care at this time. Pt. states he changes his SPT by himself. reports SPT leaking since the time of initial placement, mostly positional. Pt. denies fever, chills, N/V, SOB, abdominal pain.       PAST MEDICAL & SURGICAL HISTORY:  Anemia  PAD (peripheral artery disease)  Hypertension  Suprapubic catheter  Pressure ulcer  Diabetes  Lumbar compression fracture  S/P below knee amputation, right  S/P debridement  Toe amputation status, right  H/O hernia repair      REVIEW OF SYSTEMS:    CONSTITUTIONAL: no fevers or chills  HEENT: No visual changes  ENDO: No sweating  NECK: No pain or stiffness  MUSCULOSKELETAL: No back pain, no joint pain  RESPIRATORY: No shortness of breath  CARDIOVASCULAR: No chest pain  GASTROINTESTINAL: No abdominal or epigastric pain. No nausea, vomiting,  No diarrhea or constipation.   NEUROLOGICAL: No mental status changes  PSYCH: No depression, no mood changes  SKIN: No itching  : as per HPI    MEDICATIONS  (STANDING):  chlorhexidine 4% Liquid 1 Application(s) Topical <User Schedule>  dextrose 5% + sodium chloride 0.45%. 1000 milliLiter(s) (75 mL/Hr) IV Continuous <Continuous>  gabapentin 200 milliGRAM(s) Oral daily  heparin  Injectable 5000 Unit(s) SubCutaneous every 8 hours  methadone    Tablet 80 milliGRAM(s) Oral every 6 hours  testosterone 1% Gel 50 milliGRAM(s) Topical daily    MEDICATIONS  (PRN):  diazepam    Tablet 10 milliGRAM(s) Oral two times a day PRN agitation      Allergies    sulfamethizole (Unknown)       SOCIAL HISTORY:  active smoker    FAMILY HISTORY:  No pertinent family history in first degree relatives      Vital Signs Last 24 Hrs  T(C): 37 (29 Jun 2019 06:49), Max: 37 (29 Jun 2019 06:49)  T(F): 98.6 (29 Jun 2019 06:49), Max: 98.6 (29 Jun 2019 06:49)  HR: 85 (29 Jun 2019 06:49) (79 - 85)  BP: 131/68 (29 Jun 2019 06:49) (86/52 - 131/68)  RR: 17 (29 Jun 2019 06:49) (17 - 18)  SpO2: 97% (29 Jun 2019 08:58) (97% - 97%)     PHYSICAL EXAM:    Constitutional: NAD, awake an d alert   HEENT: NC/AT, EOMI  Back: No CVA tenderness  Respiratory: No accessory respiratory muscle use  Abd: Soft, mildly distended, no ttp, + SPT (22 Fr size) in place draining yellow urine SPT dressing in place   : normal male genitalia, testis descended b/l, no ttp.   Neurological: A/O x 3  Psychiatric: Normal mood, normal affect     I&O's Detail    28 Jun 2019 07:01  -  29 Jun 2019 07:00  --------------------------------------------------------  IN:    sodium chloride 0.9%: 1200 mL  Total IN: 1200 mL    OUT:    Indwelling Catheter - Suprapubic: 500 mL  Total OUT: 500 mL    Total NET: 700 mL      LABS:                        8.5    12.66 )-----------( 393      ( 29 Jun 2019 05:32 )             28.5     06-29    140  |  111<H>  |  50<H>  ----------------------------<  56<L>  5.1<H>   |  16<L>  |  4.8<HH>    Ca    8.1<L>      29 Jun 2019 05:32  Mg     1.8     06-29    Urinalysis (06.27.19 @ 10:20)    Glucose Qualitative, Urine: Negative    Blood, Urine: Moderate    pH Urine: 6.5    Color: Yellow    Urine Appearance: Cloudy    Bilirubin: Negative    Ketone - Urine: Negative    Specific Gravity: 1.020    Protein, Urine: >=300    Urobilinogen: 0.2    Nitrite: Negative    Leukocyte Esterase Concentration: Small     Culture - Urine (08.15.18 @ 09:30)    -  Amikacin: S <=8    -  Amikacin: S <=8    -  Amoxicillin/Clavulanic Acid: R >16/8    -  Ampicillin: R >16 These ampicillin results predict results for amoxicillin    -  Ampicillin: R >8 Predicts results to ampicillin/sulbactam, amoxacillin-clavulanate and  piperacillin-tazobactam.    -  Ampicillin/Sulbactam: R >16/8    -  Aztreonam: I 16    -  Aztreonam: S <=4    -  Cefazolin: R >16    -  Cefepime: S 8    -  Cefepime: S <=2    -  Cefoxitin: S <=4    -  Ceftazidime: S 4    -  Ceftriaxone: R 32 Enterobacter, Citrobacter, and Serratia may develop resistance during prolonged therapy    -  Trimethoprim/Sulfamethoxazole: S <=0.5/9.5    -  Vancomycin: R >16    -  Ciprofloxacin: S <=0.5    -  Ciprofloxacin: S <=0.5    -  Ciprofloxacin: R >2    -  Ertapenem: S <=0.5    -  Gentamicin: S 2    -  Gentamicin: S <=1    -  Imipenem: S <=1    -  Imipenem: I 2    -  Levofloxacin: S 2    -  Levofloxacin: S <=1    -  Levofloxacin: R >4    -  Linezolid: S 2    -  Meropenem: S 2    -  Meropenem: S <=1    -  Nitrofurantoin: R >64 Should not be used to treat pyelonephritis    -  Nitrofurantoin: I 64 Should not be used to treat pyelonephritis.    -  Piperacillin/Tazobactam: S 16    -  Piperacillin/Tazobactam: S <=8    -  Tetra/Doxy: R >8    -  Tigecycline: R 2    -  Tobramycin: S <=2    -  Tobramycin: S <=2    Specimen Source: .Urine Suprapubic    Culture Results:   Numerous Morganella morganii  Numerous Pseudomonas aeruginosa  Moderate Enterococcus faecium (vancomycin resistant)    Organism Identification: Morganella morganii  Pseudomonas aeruginosa  Enterococcus faecium (vancomycin resistant)    Organism: Morganella morganii    Organism: Pseudomonas aeruginosa    Organism: Enterococcus faecium (vancomycin resistant)          RADIOLOGY & ADDITIONAL STUDIES:     < from: US Retroperitoneal Complete (06.04.19 @ 15:37) >    EXAM:  US RETROPERITONEAL COMPLETE            PROCEDURE DATE:  06/04/2019            INTERPRETATION:  CLINICAL HISTORY: Renal failure. Acute kidney injury.    COMPARISON: None.    PROCEDURE: Retroperitoneal Ultrasound was performed.    FINDINGS:    RIGHT KIDNEY: Measures 11.8 cm in length. No evidence of hydronephrosis,   calculus or solid mass. 1.6 cm right upper pole cyst. The right kidney is   increased in echogenicity.    LEFT KIDNEY: Measures 10.5 cm in length. No evidence of hydronephrosis,   calculus or solid mass. The left kidney is increased in echogenicity.    URINARY BLADDER: Nondiagnostic evaluation of the urinary bladder which is   collapsed around a catheter.    IMPRESSION:    No hydronephrosis or renal calculus.  Bilateral echogenic kidneys compatible with medical renal disease.  Nondiagnostic evaluation of the urinary bladder which is collapsed around   a catheter.    < end of copied text >

## 2019-06-29 NOTE — PROGRESS NOTE ADULT - SUBJECTIVE AND OBJECTIVE BOX
MARGE BELLA  58y  Male      Patient is a 58y old  Male who presents with a chief complaint of Acute kidney injury, anemia (2019 15:16)      INTERVAL HPI/OVERNIGHT EVENTS:      ******************************* REVIEW OF SYSTEMS:**********************************************  Remains comfortable w/o any distress.   All other review of systems negative    *********************** VITALS ******************************************    T(F): 98.6 (19 @ 06:49)  HR: 85 (19 @ 06:49) (79 - 85)  BP: 131/68 (19 @ 06:49) (86/52 - 131/68)  RR: 17 (19 @ 06:49) (17 - 18)  SpO2: 97% (19 @ 08:58) (97% - 97%)    19 @ 07:01  -  19 @ 07:00  --------------------------------------------------------  IN: 1200 mL / OUT: 500 mL / NET: 700 mL            19 @ 07:01  -  19 @ 07:00  --------------------------------------------------------  IN: 1200 mL / OUT: 500 mL / NET: 700 mL        ******************************** PHYSICAL EXAM:**************************************************  GENERAL: NAD    PSYCH: no agitation, baseline mentation  HEENT:     NERVOUS SYSTEM:  Alert & Oriented X3,     PULMONARY: DESIRAE, CTA    CARDIOVASCULAR: S1S2 RRR    GI: Soft, NT, ND; BS present.    EXTREMITIES:  right BKA, LLE edema    LYMPH: No lymphadenopathy noted    SKIN: sacral decubiti    ******************************************************************************************    Consultant(s) Notes Reviewed:  [x ] YES  [ ] NO    Discussed with Consultants/Other Providers [ x] YES     **************************** LABS *******************************************************                          8.5    12.66 )-----------( 393      ( 2019 05:32 )             28.5         140  |  111<H>  |  50<H>  ----------------------------<  56<L>  5.1<H>   |  16<L>  |  4.8<HH>    Ca    8.1<L>      2019 05:32  Mg     1.8           ABG - ( 2019 23:42 )  pH, Arterial: 7.20  pH, Blood: x     /  pCO2: 46    /  pO2: 72    / HCO3: 18    / Base Excess: -9.8  /  SaO2: 93                Urinalysis Basic - ( 2019 10:20 )    Color: Yellow / Appearance: Cloudy / S.020 / pH: x  Gluc: x / Ketone: Negative  / Bili: Negative / Urobili: 0.2   Blood: x / Protein: >=300 / Nitrite: Negative   Leuk Esterase: Small / RBC: 11-25 /HPF / WBC 6-10 /HPF   Sq Epi: x / Non Sq Epi: Moderate /HPF / Bacteria: Moderate /HPF        Lactate Trend        CAPILLARY BLOOD GLUCOSE      POCT Blood Glucose.: 67 mg/dL (2019 09:14)          **************************Active Medications *******************************************  sulfamethizole (Unknown)      chlorhexidine 4% Liquid 1 Application(s) Topical <User Schedule>  dextrose 5% + sodium chloride 0.45%. 1000 milliLiter(s) IV Continuous <Continuous>  diazepam    Tablet 10 milliGRAM(s) Oral two times a day PRN  gabapentin 200 milliGRAM(s) Oral daily  heparin  Injectable 5000 Unit(s) SubCutaneous every 8 hours  methadone    Tablet 80 milliGRAM(s) Oral every 6 hours  testosterone 1% Gel 50 milliGRAM(s) Topical daily      ***************************************************  RADIOLOGY & ADDITIONAL TESTS:    Imaging Personally Reviewed:  [ ] YES  [ ] NO    HEALTH ISSUES - PROBLEM Dx:

## 2019-06-29 NOTE — CONSULT NOTE ADULT - ASSESSMENT
58 y.o M paraplegic bedbound, wheel chair bound with chronic SPT that leaking since the time of initial placement.       Plan:  No acute  intervention needed   Cont SPT drain to gravity  Dressing changes daily  Change SPT every 4-6 weeks  Might F/U with  office/clinic as an outpatient

## 2019-06-29 NOTE — CONSULT NOTE ADULT - SUBJECTIVE AND OBJECTIVE BOX
NEPHROLOGY CONSULTATION NOTE    Patient is a 58y Male whom presented to the hospital with s/p fall.  58 years old male with PMH of HTN, chronic back pain, chronic anemia, L1 burst fracture with paraplegia since  (bedbound and wheelchair bound), PAD s/p recent BKA on , neurogenic bladder with suprapubic catheter (recently changed 3 days ago) presented with weakness and generalized pain.  Patient was found to be on the floor this morning by his mother. Patient states that he was trying to transfer himself from bed to his wheelchair but he fell on the floor. Patient was recently admitted to [facility] for RLE gangrene and had BKA done on . Patient was discharged on  to SNF initially but patient refused, so he was discharged home instead. Patient has fallen at home 3 times since his discharge. Patient reports weakness, bilateral shoulder pain, and chronic low back pain. Patient denies fever/chills, cough, chest pain, shortness of breath, abdominal pain, nausea/vomiting, diarrhea/constipation. Patient was found to be anemic with hemoglobin of 6 (baseline 8-9) and elevated creatinine of 4.8 (baseline 0.8 in 2018, 3.8 in 2019).  Seen for SILVINA on CKD and rising K+. Started on IVF.    PAST MEDICAL & SURGICAL HISTORY:  Anemia  PAD (peripheral artery disease)  Hypertension  Suprapubic catheter  Pressure ulcer  Diabetes  Lumbar compression fracture  S/P below knee amputation, right  S/P debridement  Toe amputation status, right  H/O hernia repair    Allergies:  sulfamethizole (Unknown)    Home Medications Reviewed  Hospital Medications:   MEDICATIONS  (STANDING):  chlorhexidine 4% Liquid 1 Application(s) Topical <User Schedule>  dextrose 5% + sodium chloride 0.45%. 1000 milliLiter(s) (75 mL/Hr) IV Continuous <Continuous>  gabapentin 200 milliGRAM(s) Oral daily  heparin  Injectable 5000 Unit(s) SubCutaneous every 8 hours  methadone    Tablet 80 milliGRAM(s) Oral every 6 hours  testosterone 1% Gel 50 milliGRAM(s) Topical daily      SOCIAL HISTORY:  Denies ETOH,Smoking,   FAMILY HISTORY:  No pertinent family history in first degree relatives        REVIEW OF SYSTEMS:  CONSTITUTIONAL: No weakness, fevers or chills  RESPIRATORY: No cough, wheezing, hemoptysis; No shortness of breath  CARDIOVASCULAR: No chest pain or palpitations.  GASTROINTESTINAL: No abdominal or epigastric pain. No nausea, vomiting, or hematemesis; No diarrhea or constipation. No melena   GENITOURINARY: No dysuria, frequency,   VASCULAR: Mild bilateral lower extremity edema.   All other review of systems is negative unless indicated above.    VITALS:  T(F): 96.8 (19 @ 13:45), Max: 98.6 (19 @ 06:49)  HR: 79 (19 @ 13:45)  BP: 106/67 (19 @ 13:45)  RR: 18 (19 @ 13:45)  SpO2: 97% (19 @ 08:58)     @ 07:01  -   @ 07:00  --------------------------------------------------------  IN: 1200 mL / OUT: 500 mL / NET: 700 mL     @ 07:  -   @ 16:58  --------------------------------------------------------  IN: 0 mL / OUT: 300 mL / NET: -300 mL            I&O's Detail     07:00  --------------------------------------------------------  IN:    sodium chloride 0.9%: 1200 mL  Total IN: 1200 mL    OUT:    Indwelling Catheter - Suprapubic: 500 mL  Total OUT: 500 mL    Total NET: 700 mL       16:58  --------------------------------------------------------  IN:  Total IN: 0 mL    OUT:    Indwelling Catheter - Suprapubic: 300 mL  Total OUT: 300 mL    Total NET: -300 mL            PHYSICAL EXAM:  Constitutional: NAD, dry oral mucosa  HEENT: anicteric sclera,  Neck: JVD+  Respiratory: coarse BS+  Cardiovascular: S1, S2, RRR  Gastrointestinal: BS+, soft, NT/ND  Extremities:  1+ peripheral edema, Rt BKA  Neurological: Somnolent, arousable  Psychiatric: Normal mood, normal affect  : No CVA tenderness. Suprapubic cath.   Skin: No rashes  Vascular Access:    LABS:      140  |  111<H>  |  50<H>  ----------------------------<  56<L>  5.1<H>   |  16<L>  |  4.8<HH>    Ca    8.1<L>      2019 05:32  Mg     1.8           Creatinine Trend: 4.8 <--, 4.6 <--, 5.0 <--, 5.1 <--, 4.9 <--, 4.9 <--, 4.8 <--, 4.8 <--, 3.6 <--, 3.5 <--, 3.6 <--, 3.8 <--, 4.0 <--, 3.9 <--, 4.0 <--, 4.2 <--, 3.9 <--, 4.2 <--, 4.1 <--, 4.0 <--, 4.2 <--, 4.3 <--, 4.6 <--, 4.9 <--                        8.5    12.66 )-----------( 393      ( 2019 05:32 )             28.5     Urine Studies:  Urinalysis Basic - ( 2019 10:20 )    Color: Yellow / Appearance: Cloudy / S.020 / pH:   Gluc:  / Ketone: Negative  / Bili: Negative / Urobili: 0.2   Blood:  / Protein: >=300 / Nitrite: Negative   Leuk Esterase: Small / RBC: 11-25 /HPF / WBC 6-10 /HPF   Sq Epi:  / Non Sq Epi: Moderate /HPF / Bacteria: Moderate /HPF      Sodium, Random Urine: 64.0 mmoL/L ( @ 10:30)  Creatinine, Random Urine: 35 mg/dL ( @ 10:30)  Osmolality, Random Urine: 286 mos/kg ( @ 10:20)            RADIOLOGY & ADDITIONAL STUDIES:  < from: Xray Chest 1 View AP/PA (19 @ 17:21) >  Unchanged appearance of the lungs. Support devices are stable.    < end of copied text >  < from: US Retroperitoneal Complete (19 @ 15:37) >  No hydronephrosis or renal calculus.  Bilateral echogenic kidneys compatible with medical renal disease.  Nondiagnostic evaluation of the urinary bladder which is collapsed around   a catheter.    < end of copied text >

## 2019-06-29 NOTE — PROVIDER CONTACT NOTE (OTHER) - RECOMMENDATIONS
Urology team entered note from 1030 today and stated no surgical intervention. Patient doesn't remember seeing urology. Suprapubic Cath is still leaking around insertion site.

## 2019-06-29 NOTE — CONSULT NOTE ADULT - ASSESSMENT
58 M w/ PMH of Paraplegia 2/2 Lumbar compression Fx, sacral decubiti and heel ulcers s/p debridements,, PAD, suprapubic cath, Neuropathy, DM2 & HTN , Rt gsngrene, sp BKA admitted with s/p fall found to be in SILVINA and have hyperKalemia.    # SILVINA w/ Hyperchloremic Metabolic Acidosis   (Cr 4.9, baseline 1.1 in   8/ 2018; 3.5 mg% on June 18,2019)  - due to ATN vs pre renal   - check urine lytes, CPK  - change NS to 1/2 NS 75 cc/hr (hyperchloremic)  - add po NA bicarb 650 tid  - kidney and blader sono from 6/4/19 echogenic kidneys w/o hydro c/w CKD  - monitor urine out put  - avoid hypotension   -low K diet, Insulin and glucose if needed  -no need for RRT   -spoke to pt's mother - she does not think he would agree to HD if needed    Thank you    # Microcytic Anemia  - s/p 2 U PRBCs in ER   plz check iron studies       ATTENDING NOTE TO BE FOLLOWED 58 M w/ PMH of Paraplegia 2/2 Lumbar compression Fx, sacral decubiti and heel ulcers s/p debridements,, PAD, suprapubic cath, Neuropathy, DM2 & HTN , Rt gsngrene, sp BKA admitted with s/p fall found to be in SILVINA and have hyperKalemia.    # SILVINA w/ Hyperchloremic Metabolic Acidosis   (Cr 4.9, baseline 1.1 in   8/ 2018; 3.5 mg% on June 18,2019)  - due to ATN vs pre renal   - check urine lytes, CPK  - change NS to 1/2 NS 75 cc/hr (hyperchloremic)  - add po NA bicarb 650 tid  - kidney and blader sono from 6/4/19 echogenic kidneys w/o hydro c/w CKD  - monitor urine out put  - avoid hypotension   -low K diet, Insulin and glucose if needed  -no need for RRT   -spoke to pt's mother - she does not think he would agree to HD if needed    # Microcytic Anemia  - s/p 2 U PRBCs in ER   plz check iron studies     Thank you

## 2019-06-29 NOTE — PROGRESS NOTE ADULT - ASSESSMENT
58 years old male with reported PMH of HTN, chronic back pain, chronic anemia, L1 burst fracture with paraplegia since 1986 (bedbound and wheelchair bound), PAD s/p recent BKA on 6/4, neurogenic bladder with suprapubic catheter presented with weakness and generalized pain    # Acute on chronic normocytic anemia  - Hgb 6 on admission, baseline 8-9  - No signs of active bleeding, rectal exam shows brown stool  - s/p 1 units of PRBC, > HB 8.5 today.   - Active T&S. keep hgb above 7    # Acute kidney injury on CKD 4 with persistent Hyperkalemia   - Possibly due to pre-renal 2/2 dehydration > On IV hydration  - Kidney US on 6/4 shows no hydronephrosis, bilateral echogenic kidney compatible with medical renal disease  - Having adequate urine output from suprapubic catheter  - Encourage oral hydration  - monitor K level closely, if persistent > might need RRT.   - Pending Renal eval.     Persistent Hypoglycemia ( asymptomatic)   Will switch IVF to D51/2NS   Check cortisol level.    # HTN  - Well- controlled  - Monitor off medication for now    # DM  - Was on Januvia but became hypoglycemic  - Check A1C    # Chronic back pain with functional paraplegia  - Continue methadone 80mg Q6hrs. I stopped.   - Renally dosed gabapentin (200mg QD)  - PT/Rehab (patient refused SNF but now agrees with rehab)    # Anxiety  - Continue Valium , hold on xanax for increased lethargy.       DVT ppx: Heparin subQ  GI ppx: Not indicated  Diet: DASH  Activity: increase as tolerated  Code status: full code  Dispo: acute, pending PT/Rehab consult      #Progress Note Handoff  Pending (specify):  Clinical improvement from SILVINA /hyperkalemia  Family discussion: D/W patient and the mother at bedside.   Disposition: Possible SNF

## 2019-06-30 NOTE — PROGRESS NOTE ADULT - SUBJECTIVE AND OBJECTIVE BOX
58 years old male with reported PMH of HTN, chronic back pain, chronic anemia, L1 burst fracture with paraplegia since 1986 (bedbound and wheelchair bound), PAD s/p recent BKA on 6/4, neurogenic bladder with suprapubic catheter (recently changed 3 days ago) presented with weakness and generalized pain. History was also obtained from patient's mother at bedside. Patient was found to be on the floor this morning by his mother. Patient states that he was trying to transfer himself from bed to his wheelchair but he fell on the floor. Patient denies head trauma or LOC. His mother later found him and called the ambulance. Patient was recently admitted to [facility] for RLE gangrene and had BKA done on 6/4. Patient was discharged on 6/21 to SNF initially but patient refused, so he was discharged home instead. Patient has fallen at home 3 times since his discharge. Patient reports weakness, bilateral shoulder pain, and chronic low back pain. Patient denies fever/chills, cough, chest pain, shortness of breath, abdominal pain, nausea/vomiting, diarrhea/constipation. Patient was found to be anemic with hemoglobin of 6 (baseline 8-9) and elevated creatinine of 4.8 (baseline 0.8 in 2018, 3.8 in 2019).    In ED: 1 unit PRBC, 1L NS (26 Jun 2019 20:12)      PAST MEDICAL & SURGICAL HISTORY:  Anemia  PAD (peripheral artery disease)  Hypertension  Suprapubic catheter  Pressure ulcer  Diabetes  Lumbar compression fracture  S/P below knee amputation, right  S/P debridement  Toe amputation status, right  H/O hernia repair

## 2019-06-30 NOTE — PROGRESS NOTE ADULT - ASSESSMENT
58 years old male with reported PMH of HTN, chronic back pain, chronic anemia, L1 burst fracture with paraplegia since 1986 (bedbound and wheelchair bound), PAD s/p recent BKA on 6/4, neurogenic bladder with suprapubic catheter presented with weakness and generalized pain      SILVINA on CKD 4  - Possibly due to pre-renal 2/2 dehydration and anima  - Kidney US on 6/4 shows no hydronephrosis, bilateral echogenic kidney compatible with medical renal disease  - Having adequate urine output from suprapubic catheter  - Tolerating oral intake  - Encourage oral hydration  - BMP daily  -Urology consulted, Cont SPT drain to gravity, change SPT every 4-6 weeks and might F/U with  office/clinic as an outpatient  - nephrology consulted, urine lytes, CPK sent, added po NA bicarb 650 tid  -low K diet, Insulin and glucose if needed  -no need for RRT     HTN   -well controlled    Stage III-IV decubitus; wet packing + saline and hydrogel    HyperKalemia  -5.1 today, f/u blood and urine lytes  -insulin and D5 if increased      Persistent Hypoglycemia ( asymptomatic)  - AM cortisol level sent.    Acute on chronic normocytic anemia; resolved  - Hgb 6 on admission, baseline 8-9  - No signs of active bleeding, rectal exam shows brown stool  - s/p 1 units of PRBC, current Hb stable  - Keep two 18-gauge IVs  - Active T&S. keep Hb > 7, 8.5 today  - CBC daily    Chronic back pain with functional paraplegia  - Continue methadone 80mg Q6hrs (approved by pain management in last visit), Oxycodone 30mg Q6hr PRN  - Renally dosed gabapentin (200mg QD)  - PT/Rehab (patient refused SNF but now agrees with rehab)    DM; was on Januvia but became hypoglycemic. F/u HbA1c    Anxiety; valium PRN instead of around the clock    DVT ppx: Heparin subQ  GI ppx: Not indicated  Diet: DASH  Activity: increase as tolerated  Code status: full code  Dispo: acute, pending PT/Rehab consult

## 2019-06-30 NOTE — PROGRESS NOTE ADULT - ASSESSMENT
58 M w/ PMH of Paraplegia 2/2 Lumbar compression Fx, sacral decubiti and heel ulcers s/p debridements,, PAD, suprapubic cath, Neuropathy, DM2 & HTN , Rt gsngrene, sp BKA admitted with s/p fall found to be in SILVINA and have hyperKalemia.    # SILVINA w/ Hyperchloremic Metabolic Acidosis   (Cr 4.9, baseline 1.1 in   8/ 2018; 3.5 mg% on June 18,2019)  - due to ATN vs pre renal   - check urine lytes, CPK  - change NS to 1/2 NS 75 cc/hr (hyperchloremic)  - add po NA bicarb 650 tid  - kidney and blader sono from 6/4/19 echogenic kidneys w/o hydro c/w CKD  - monitor urine out put  - avoid hypotension   -low K diet, Insulin and glucose if needed  -no need for RRT   -spoke to pt's mother - she does not think he would agree to HD if needed    Thank you    # Microcytic Anemia  - s/p 2 U PRBCs in ER   plz check iron studies       ATTENDING NOTE TO BE FOLLOWED 58 M w/ PMH of Paraplegia 2/2 Lumbar compression Fx, sacral decubiti and heel ulcers s/p debridements,, PAD, suprapubic cath, Neuropathy, DM2 & HTN , Rt gsngrene, sp BKA admitted with s/p fall found to be in SILVINA and have hyperKalemia.    # SILVINA w/ Hyperchloremic Metabolic Acidosis on CKD 3-4   (Cr 4.9, baseline 1.1 in   8/ 2018; 3.5 mg% on June 18,2019)  - due to ATN vs pre renal   - urine lytes c/w ATN   - cont 1/2 NS 75 cc/hr (hyperchloremic)  - cont NA bicarb 650 tid  - kidney and blader sono from 6/4/19 echogenic kidneys w/o hydro c/w CKD  - monitor urine out put  - avoid hypotension   -no need for RRT   -spoke to pt's mother - she does not think he would agree to HD if needed    will follow     # Microcytic Anemia  - s/p 2 U PRBCs in ER   plz check iron studies       ATTENDING NOTE TO BE FOLLOWED

## 2019-06-30 NOTE — PROGRESS NOTE ADULT - ASSESSMENT
58 years old male with reported PMH of HTN, chronic back pain, chronic anemia, L1 burst fracture with paraplegia since 1986 (bedbound and wheelchair bound), PAD s/p recent BKA on 6/4, neurogenic bladder with suprapubic catheter presented with weakness and generalized pain    # Acute on chronic normocytic anemia  - Hgb 6 on admission, baseline 8-9  - No signs of active bleeding, rectal exam shows brown stool  - s/p 1 units of PRBC, > HB 8.5 today.   - Active T&S. keep hgb above 7    # Acute kidney injury on CKD 4 with  Hyperkalemia   - stable/   - Possibly due to pre-renal 2/2 dehydration > On IV hydration  - Kidney US on 6/4 shows no hydronephrosis, bilateral echogenic kidney compatible with medical renal disease  - Encourage oral hydration  - monitor K level closely.   - Renal eval. noted and appreciated.     Persistent Hypoglycemia ( asymptomatic)   switched IVF to D51/2NS   Check cortisol level.    # HTN  - Well- controlled  - Monitor off medication for now    # DM  - Was on Januvia but became hypoglycemic  - Check A1C    # Chronic back pain with functional paraplegia  - Continue methadone 80mg Q6hrs. I stopped.   - Renally dosed gabapentin (200mg QD)  - PT/Rehab (patient refused SNF but now agrees with rehab)    # Anxiety  - Continue Valium , hold on xanax for increased lethargy.       DVT ppx: Heparin subQ  GI ppx: Not indicated  Diet: DASH  Activity: increase as tolerated  Code status: full code  Dispo: acute, pending PT/Rehab consult      #Progress Note Handoff  Pending (specify):  Clinical improvement from SILVINA /hyperkalemia  Family discussion: D/W patient and the mother at bedside.   Disposition: Possible SNF

## 2019-06-30 NOTE — PROGRESS NOTE ADULT - SUBJECTIVE AND OBJECTIVE BOX
Nephrology progress note    Patient is seen and examined, events over the last 24 h noted .    Allergies:  sulfamethizole (Unknown)    Hospital Medications:   MEDICATIONS  (STANDING):  chlorhexidine 4% Liquid 1 Application(s) Topical <User Schedule>  dextrose 5% + sodium chloride 0.45%. 1000 milliLiter(s) (75 mL/Hr) IV Continuous <Continuous>  gabapentin 200 milliGRAM(s) Oral daily  heparin  Injectable 5000 Unit(s) SubCutaneous every 8 hours  methadone    Tablet 80 milliGRAM(s) Oral every 6 hours  sodium bicarbonate 650 milliGRAM(s) Oral three times a day  testosterone 1% Gel 50 milliGRAM(s) Topical daily        VITALS:  T(F): 98.7 (19 @ 06:22), Max: 98.7 (19 @ 06:22)  HR: 81 (19 @ 06:22)  BP: 125/65 (19 @ 06:22)  RR: 17 (19 @ 06:22)  SpO2: 100% (19 @ 23:35)  Wt(kg): --     @ 07:  -   @ 07:00  --------------------------------------------------------  IN: 1200 mL / OUT: 500 mL / NET: 700 mL     @ 07:  -   @ 07:00  --------------------------------------------------------  IN: 0 mL / OUT: 300 mL / NET: -300 mL          PHYSICAL EXAM:  Constitutional: NAD  HEENT: anicteric sclera, oropharynx clear, MMM  Neck: No JVD  Respiratory: CTAB, no wheezes, rales or rhonchi  Cardiovascular: S1, S2, RRR  Gastrointestinal: BS+, soft, NT/ND  Extremities: No cyanosis or clubbing. No peripheral edema  :  No brown.   Skin: No rashes    LABS:      141  |  111<H>  |  47<H>  ----------------------------<  64<L>  5.3<H>   |  16<L>  |  4.8<HH>    Ca    8.0<L>      2019 06:23  Mg     1.6                                 8.1    8.59  )-----------( 396      ( 2019 06:23 )             27.3       Urine Studies:  Urinalysis Basic - ( 2019 10:20 )    Color: Yellow / Appearance: Cloudy / S.020 / pH:   Gluc:  / Ketone: Negative  / Bili: Negative / Urobili: 0.2   Blood:  / Protein: >=300 / Nitrite: Negative   Leuk Esterase: Small / RBC: 11-25 /HPF / WBC 6-10 /HPF   Sq Epi:  / Non Sq Epi: Moderate /HPF / Bacteria: Moderate /HPF      Sodium, Random Urine: 64.0 mmoL/L ( @ 10:30)  Creatinine, Random Urine: 35 mg/dL ( @ 10:30)  Osmolality, Random Urine: 286 mos/kg ( @ 10:20)    RADIOLOGY & ADDITIONAL STUDIES: Nephrology progress note    Patient is seen and examined, events over the last 24 h noted .  feels better today  positive SPC / leaking as per patient     Allergies:  sulfamethizole (Unknown)    Hospital Medications:   MEDICATIONS  (STANDING):  dextrose 5% + sodium chloride 0.45%. 1000 milliLiter(s) (75 mL/Hr) IV Continuous <Continuous>  gabapentin 200 milliGRAM(s) Oral daily  heparin  Injectable 5000 Unit(s) SubCutaneous every 8 hours  methadone    Tablet 80 milliGRAM(s) Oral every 6 hours  sodium bicarbonate 650 milliGRAM(s) Oral three times a day  testosterone 1% Gel 50 milliGRAM(s) Topical daily        VITALS:  T(F): 98.7 (19 @ 06:22), Max: 98.7 (19 @ 06:22)  HR: 81 (19 @ 06:22)  BP: 125/65 (19 @ 06:22)  RR: 17 (19 @ 06:22)  SpO2: 100% (19 @ 23:35)       @ 07:  -   @ 07:00  --------------------------------------------------------  IN: 1200 mL / OUT: 500 mL / NET: 700 mL     @ 07:01  -   @ 07:00  --------------------------------------------------------  IN: 0 mL / OUT: 300 mL / NET: -300 mL          PHYSICAL EXAM:  Constitutional: lethargic   HEENT: anicteric sclera, oropharynx clear, MMM  Neck: No JVD  Respiratory: CTAB, no wheezes, rales or rhonchi  Cardiovascular: S1, S2, RRR  Gastrointestinal: BS+, soft, NT/ND positive SPC   Extremities: No cyanosis or clubbing. plus one  peripheral edema/ right BKA   : SPC   Skin: No rashes    LABS:      141  |  111<H>  |  47<H>  ----------------------------<  64<L>  5.3<H>   |  16<L>  |  4.8<HH>  Creatinine Trend: 4.8<--, 4.8<--, 4.6<--, 5.0<--, 5.1<--, 4.9<--  Ca    8.0<L>      2019 06:23  Mg     1.6                                 8.1    8.59  )-----------( 396      ( 2019 06:23 )             27.3       Urine Studies:  Urinalysis Basic - ( 2019 10:20 )    Color: Yellow / Appearance: Cloudy / S.020 / pH:   Gluc:  / Ketone: Negative  / Bili: Negative / Urobili: 0.2   Blood:  / Protein: >=300 / Nitrite: Negative   Leuk Esterase: Small / RBC: 11-25 /HPF / WBC 6-10 /HPF   Sq Epi:  / Non Sq Epi: Moderate /HPF / Bacteria: Moderate /HPF      Sodium, Random Urine: 64.0 mmoL/L ( @ 10:30)  Creatinine, Random Urine: 35 mg/dL ( @ 10:30)  Osmolality, Random Urine: 286 mos/kg ( @ 10:20)    RADIOLOGY & ADDITIONAL STUDIES:

## 2019-06-30 NOTE — PROGRESS NOTE ADULT - SUBJECTIVE AND OBJECTIVE BOX
MARGE BELLA  58y  Male      Patient is a 58y old  Male who presents with a chief complaint of Acute kidney injury, anemia (30 Jun 2019 10:09)      INTERVAL HPI/OVERNIGHT EVENTS:      ******************************* REVIEW OF SYSTEMS:**********************************************  Remains stable/ C/O leaking SPC.   All other review of systems negative    *********************** VITALS ******************************************    T(F): 98.7 (06-30-19 @ 06:22)  HR: 81 (06-30-19 @ 06:22) (77 - 81)  BP: 125/65 (06-30-19 @ 06:22) (106/67 - 126/59)  RR: 17 (06-30-19 @ 06:22) (17 - 18)  SpO2: 100% (06-29-19 @ 23:35) (100% - 100%)    06-29-19 @ 07:01  -  06-30-19 @ 07:00  --------------------------------------------------------  IN: 0 mL / OUT: 300 mL / NET: -300 mL            06-29-19 @ 07:01  -  06-30-19 @ 07:00  --------------------------------------------------------  IN: 0 mL / OUT: 300 mL / NET: -300 mL        ******************************** PHYSICAL EXAM:**************************************************  GENERAL: NAD    PSYCH: no agitation, baseline mentation  HEENT:     NERVOUS SYSTEM:  Alert & Oriented X3, MS  5/5 B/L  UE and LE ; Sensory intact    PULMONARY: DESIRAE, CTA    CARDIOVASCULAR: S1S2 RRR    GI: Soft, NT, ND; BS present.    EXTREMITIES: Right BKA with LLE edema.     LYMPH: No lymphadenopathy noted    SKIN: No rashes or lesions    ******************************************************************************************    Consultant(s) Notes Reviewed:  [x ] YES  [ ] NO    Discussed with Consultants/Other Providers [ x] YES     **************************** LABS *******************************************************                          8.1    8.59  )-----------( 396      ( 30 Jun 2019 06:23 )             27.3     06-30    141  |  111<H>  |  47<H>  ----------------------------<  64<L>  5.3<H>   |  16<L>  |  4.8<HH>    Ca    8.0<L>      30 Jun 2019 06:23  Mg     1.6     06-30            Lactate Trend        CAPILLARY BLOOD GLUCOSE      POCT Blood Glucose.: 70 mg/dL (30 Jun 2019 07:38)          **************************Active Medications *******************************************  sulfamethizole (Unknown)      chlorhexidine 4% Liquid 1 Application(s) Topical <User Schedule>  dextrose 5% + sodium chloride 0.45%. 1000 milliLiter(s) IV Continuous <Continuous>  diazepam    Tablet 10 milliGRAM(s) Oral two times a day PRN  gabapentin 200 milliGRAM(s) Oral daily  heparin  Injectable 5000 Unit(s) SubCutaneous every 8 hours  methadone    Tablet 80 milliGRAM(s) Oral every 6 hours  sodium bicarbonate 650 milliGRAM(s) Oral three times a day  testosterone 1% Gel 50 milliGRAM(s) Topical daily      ***************************************************  RADIOLOGY & ADDITIONAL TESTS:    Imaging Personally Reviewed:  [ ] YES  [ ] NO    HEALTH ISSUES - PROBLEM Dx:

## 2019-07-01 NOTE — PROGRESS NOTE ADULT - ASSESSMENT
58 M w/ PMH of Paraplegia 2/2 Lumbar compression Fx, sacral decubiti and heel ulcers s/p debridements,, PAD, suprapubic cath, Neuropathy, DM2 & HTN , Rt gsngrene, sp BKA admitted with s/p fall found to be in SILVINA and have hyperKalemia.    # SILVINA w/ Hyperchloremic Metabolic Acidosis on CKD 3-4   (baseline 1.1 in   8/ 2018; 3.5 mg% on June 18,2019)  - likely ATN  - serum creatinine trending up  - cont 1/2 NS 75 cc/hr (hyperchloremic)  - cont NA bicarb 650 tid  - hyperK corrected on repeat labs.  - bicarb level improving.  - kidney and blader sono from 6/4/19 echogenic kidneys w/o hydro c/w CKD  - urine output noted, oliguric.  - Strict I/O  - avoid hypotension   - no urgent need for RRT     # Microcytic Anemia  - s/p 2 U PRBCs in ER  - previous labs noted adequate iron stores, plz repeat iron studies     will follow 58 M w/ PMH of Paraplegia 2/2 Lumbar compression Fx, sacral decubiti and heel ulcers s/p debridements,, PAD, suprapubic cath, Neuropathy, DM2 & HTN , Rt gsngrene, sp BKA admitted with s/p fall found to be in SILVINA and have hyperKalemia.    # SILVINA w/ Hyperchloremic Metabolic Acidosis on CKD 3-4   (baseline 1.1 in 8/ 2018; 3.5 mg% on June 18,2019)  - likely ATN  - serum creatinine trending up  - cont 1/2 NS 75 cc/hr (hyperchloremic)  - cont NA bicarb 650 tid  - hyperK corrected on repeat labs.  - bicarb level improving.  - corrected Ca at goal, hypomagnesemia noted, replenish orally. check Ph, iPTH  - kidney and blader sono from 6/4/19 echogenic kidneys w/o hydro c/w CKD  - urine output noted, oliguric.  - Strict I/O  - avoid hypotension   - no urgent need for RRT     # Microcytic Anemia  - s/p 2 U PRBCs in ER  - previous labs noted adequate iron stores, plz repeat iron studies     will follow 58 M w/ PMH of Paraplegia 2/2 Lumbar compression Fx, sacral decubiti and heel ulcers s/p debridements,, PAD, suprapubic cath, Neuropathy, DM2 & HTN , Rt gsngrene, sp BKA admitted with s/p fall found to be in SILVINA and have hyperKalemia.    # SILVINA w/ Hyperchloremic Metabolic Acidosis on CKD 3-4   (baseline 1.1 in 8/ 2018; 3.5 mg% on June 18,2019)  - likely ATN  - serum creatinine trending up  - cont 1/2 NS 75 cc/hr (hyperchloremic)  - cont NA bicarb 650 tid  - hyperK corrected on repeat labs.  - bicarb level improving.  - corrected Ca at goal, hypomagnesemia noted, replenish orally. check Ph, iPTH  - kidney and blader sono from 6/4/19 echogenic kidneys w/o hydro c/w CKD/ repeat renal bladder sono   - urine output noted, oliguric.  - Strict I/O  - avoid hypotension   - no urgent need for RRT     # Microcytic Anemia  - s/p 2 U PRBCs in ER  - previous labs noted adequate iron stores, plz repeat iron studies     will follow

## 2019-07-01 NOTE — PROGRESS NOTE ADULT - SUBJECTIVE AND OBJECTIVE BOX
58 years old male with reported PMH of HTN, chronic back pain, chronic anemia, L1 burst fracture with paraplegia since 1986 (bedbound and wheelchair bound), PAD s/p recent BKA on 6/4, neurogenic bladder with suprapubic catheter (recently changed 3 days ago) presented with weakness and generalized pain. History was also obtained from patient's mother at bedside. Patient was found to be on the floor this morning by his mother. Patient states that he was trying to transfer himself from bed to his wheelchair but he fell on the floor. Patient denies head trauma or LOC. His mother later found him and called the ambulance. Patient was recently admitted to [facility] for RLE gangrene and had BKA done on 6/4. Patient was discharged on 6/21 to SNF initially but patient refused, so he was discharged home instead. Patient has fallen at home 3 times since his discharge. Patient reports weakness, bilateral shoulder pain, and chronic low back pain. Patient denies fever/chills, cough, chest pain, shortness of breath, abdominal pain, nausea/vomiting, diarrhea/constipation. Patient was found to be anemic with hemoglobin of 6 (baseline 8-9) and elevated creatinine of 4.8 (baseline 0.8 in 2018, 3.8 in 2019).    In ED: 1 unit PRBC, 1L NS (26 Jun 2019 20:12)    PAST MEDICAL & SURGICAL HISTORY:  Anemia  PAD (peripheral artery disease)  Hypertension  Suprapubic catheter  Pressure ulcer  Diabetes  Lumbar compression fracture  S/P below knee amputation, right  S/P debridement  Toe amputation status, right  H/O hernia repair

## 2019-07-01 NOTE — PROGRESS NOTE ADULT - SUBJECTIVE AND OBJECTIVE BOX
Nephrology progress note    Patient is seen and examined, events over the last 24 h noted.  no new complaints.    Allergies:  sulfamethizole (Unknown)    Hospital Medications:   MEDICATIONS  (STANDING):  chlorhexidine 4% Liquid 1 Application(s) Topical <User Schedule>  dextrose 5% + sodium chloride 0.45%. 1000 milliLiter(s) (75 mL/Hr) IV Continuous <Continuous>  gabapentin 200 milliGRAM(s) Oral daily  heparin  Injectable 5000 Unit(s) SubCutaneous every 8 hours  methadone    Tablet 80 milliGRAM(s) Oral every 6 hours  sodium bicarbonate 650 milliGRAM(s) Oral three times a day  testosterone 1% Gel 50 milliGRAM(s) Topical daily        VITALS:  T(F): 97.7 (19 @ 05:58), Max: 97.7 (19 @ 05:58)  HR: 91 (19 @ 05:58)  BP: 134/67 (19 @ 05:58)  RR: 18 (19 @ 05:58)       @ 07:01  -   @ 07:00  --------------------------------------------------------  IN: 0 mL / OUT: 300 mL / NET: -300 mL     @ 07:01  -  0701 @ 07:00  --------------------------------------------------------  IN: 300 mL / OUT: 600 mL / NET: -300 mL          PHYSICAL EXAM:  Constitutional: NAD  Respiratory: CTAB, no wheezes, rales or rhonchi  Cardiovascular: S1, S2, RRR  Gastrointestinal: BS+, soft, NT/ND  Extremities: s/p right BKA  :  + SPC       LABS:      142  |  112<H>  |  46<H>  ----------------------------<  68<L>  4.6   |  17  |  5.1<HH>    Ca    7.7<L>      2019 17:14  Mg     1.6                                 8.1    8.59  )-----------( 396      ( 2019 06:23 )             27.3     Iron with Total Binding Capacity in AM (19 @ 06:46)    % Saturation, Iron: 51 %    Iron - Total Binding Capacity.: 77 ug/dL    Iron Total, Serum: 39 ug/dL    Unsaturated Iron Binding Capacity: 38 ug/dL      Urine Studies:  Urinalysis Basic - ( 2019 10:20 )    Color: Yellow / Appearance: Cloudy / S.020 / pH:   Gluc:  / Ketone: Negative  / Bili: Negative / Urobili: 0.2   Blood:  / Protein: >=300 / Nitrite: Negative   Leuk Esterase: Small / RBC: 11-25 /HPF / WBC 6-10 /HPF   Sq Epi:  / Non Sq Epi: Moderate /HPF / Bacteria: Moderate /HPF      Sodium, Random Urine: 64.0 mmoL/L ( @ 10:30)  Creatinine, Random Urine: 35 mg/dL ( @ 10:30)  Osmolality, Random Urine: 286 mos/kg ( @ 10:20)    RADIOLOGY & ADDITIONAL STUDIES: Nephrology progress note    Patient is seen and examined, events over the last 24 h noted.  no new complaints.    Allergies:  sulfamethizole (Unknown)    Hospital Medications:   MEDICATIONS  (STANDING):  chlorhexidine 4% Liquid 1 Application(s) Topical <User Schedule>  dextrose 5% + sodium chloride 0.45%. 1000 milliLiter(s) (75 mL/Hr) IV Continuous <Continuous>  gabapentin 200 milliGRAM(s) Oral daily  heparin  Injectable 5000 Unit(s) SubCutaneous every 8 hours  methadone    Tablet 80 milliGRAM(s) Oral every 6 hours  sodium bicarbonate 650 milliGRAM(s) Oral three times a day  testosterone 1% Gel 50 milliGRAM(s) Topical daily        VITALS:  T(F): 97.7 (19 @ 05:58), Max: 97.7 (19 @ 05:58)  HR: 91 (19 @ 05:58)  BP: 134/67 (19 @ 05:58)  RR: 18 (19 @ 05:58)       @ 07:01  -   @ 07:00  --------------------------------------------------------  IN: 0 mL / OUT: 300 mL / NET: -300 mL     @ 07:01  -  0701 @ 07:00  --------------------------------------------------------  IN: 300 mL / OUT: 600 mL / NET: -300 mL          PHYSICAL EXAM:  Constitutional: NAD  Respiratory: CTAB, no wheezes, rales or rhonchi  Cardiovascular: S1, S2, RRR  Gastrointestinal: BS+, soft, NT/ND  Extremities: s/p right BKA  :  + SPC       LABS:      142  |  112<H>  |  46<H>  ----------------------------<  68<L>  4.6   |  17  |  5.1<HH>    Ca    7.7<L>      2019 17:14  Mg     1.6       Albumin, Serum: 1.7 g/dL (19 @ 06:35)                          8.1    8.59  )-----------( 396      ( 2019 06:23 )             27.3     Iron with Total Binding Capacity in AM (19 @ 06:46)    % Saturation, Iron: 51 %    Iron - Total Binding Capacity.: 77 ug/dL    Iron Total, Serum: 39 ug/dL    Unsaturated Iron Binding Capacity: 38 ug/dL      Urine Studies:  Urinalysis Basic - ( 2019 10:20 )    Color: Yellow / Appearance: Cloudy / S.020 / pH:   Gluc:  / Ketone: Negative  / Bili: Negative / Urobili: 0.2   Blood:  / Protein: >=300 / Nitrite: Negative   Leuk Esterase: Small / RBC: 11-25 /HPF / WBC 6-10 /HPF   Sq Epi:  / Non Sq Epi: Moderate /HPF / Bacteria: Moderate /HPF      Sodium, Random Urine: 64.0 mmoL/L ( @ 10:30)  Creatinine, Random Urine: 35 mg/dL ( @ 10:30)  Osmolality, Random Urine: 286 mos/kg ( @ 10:20)    RADIOLOGY & ADDITIONAL STUDIES: Nephrology progress note    Patient is seen and examined, events over the last 24 h noted.  no new complaints.    Allergies:  sulfamethizole (Unknown)    Hospital Medications:   MEDICATIONS  (STANDING):  chlorhexidine 4% Liquid 1 Application(s) Topical <User Schedule>  dextrose 5% + sodium chloride 0.45%. 1000 milliLiter(s) (75 mL/Hr) IV Continuous <Continuous>  gabapentin 200 milliGRAM(s) Oral daily  heparin  Injectable 5000 Unit(s) SubCutaneous every 8 hours  methadone    Tablet 80 milliGRAM(s) Oral every 6 hours  sodium bicarbonate 650 milliGRAM(s) Oral three times a day  testosterone 1% Gel 50 milliGRAM(s) Topical daily        VITALS:  T(F): 97.7 (19 @ 05:58), Max: 97.7 (19 @ 05:58)  HR: 91 (19 @ 05:58)  BP: 134/67 (19 @ 05:58)  RR: 18 (19 @ 05:58)       @ 07:01  -   @ 07:00  --------------------------------------------------------  IN: 0 mL / OUT: 300 mL / NET: -300 mL     @ 07:01  -  0701 @ 07:00  --------------------------------------------------------  IN: 300 mL / OUT: 600 mL / NET: -300 mL          PHYSICAL EXAM:  Constitutional: NAD  Respiratory: CTAB, no wheezes, rales or rhonchi  Cardiovascular: S1, S2, RRR  Gastrointestinal: BS+, soft, NT/ND  Extremities: s/p right BKA  :  + SPC       LABS:      142  |  112<H>  |  46<H>  ----------------------------<  68<L>  4.6   |  17  |  5.1<HH>  Creatinine Trend: 5.1<--, 4.8<--, 4.8<--, 4.6<--, 5.0<--, 5.1<--  Ca    7.7<L>      2019 17:14  Mg     1.6       Albumin, Serum: 1.7 g/dL (19 @ 06:35)                          8.1    8.59  )-----------( 396      ( 2019 06:23 )             27.3     Iron with Total Binding Capacity in AM (19 @ 06:46)    % Saturation, Iron: 51 %    Iron - Total Binding Capacity.: 77 ug/dL    Iron Total, Serum: 39 ug/dL    Unsaturated Iron Binding Capacity: 38 ug/dL      Urine Studies:  Urinalysis Basic - ( 2019 10:20 )    Color: Yellow / Appearance: Cloudy / S.020 / pH:   Gluc:  / Ketone: Negative  / Bili: Negative / Urobili: 0.2   Blood:  / Protein: >=300 / Nitrite: Negative   Leuk Esterase: Small / RBC: 11-25 /HPF / WBC 6-10 /HPF   Sq Epi:  / Non Sq Epi: Moderate /HPF / Bacteria: Moderate /HPF      Sodium, Random Urine: 64.0 mmoL/L ( @ 10:30)  Creatinine, Random Urine: 35 mg/dL ( @ 10:30)  Osmolality, Random Urine: 286 mos/kg ( @ 10:20)

## 2019-07-01 NOTE — PROGRESS NOTE ADULT - SUBJECTIVE AND OBJECTIVE BOX
MARGE BELLA  58y  Male      Patient is a 58y old  Male who presents with a chief complaint of Acute kidney injury, anemia (01 Jul 2019 08:17)      INTERVAL HPI/OVERNIGHT EVENTS:      ******************************* REVIEW OF SYSTEMS:**********************************************  Remains comfortable and stable.   All other review of systems negative    *********************** VITALS ******************************************    T(F): 97.7 (07-01-19 @ 05:58)  HR: 91 (07-01-19 @ 05:58) (72 - 91)  BP: 134/67 (07-01-19 @ 05:58) (110/56 - 134/67)  RR: 18 (07-01-19 @ 05:58) (18 - 18)  SpO2: --    06-30-19 @ 07:01  -  07-01-19 @ 07:00  --------------------------------------------------------  IN: 300 mL / OUT: 600 mL / NET: -300 mL            06-30-19 @ 07:01  -  07-01-19 @ 07:00  --------------------------------------------------------  IN: 300 mL / OUT: 600 mL / NET: -300 mL        ******************************** PHYSICAL EXAM:**************************************************  GENERAL: NAD    PSYCH: no agitation, baseline mentation  HEENT:     NERVOUS SYSTEM:  Alert & Oriented X3,     PULMONARY: DESIRAE, CTA    CARDIOVASCULAR: S1S2 RRR    GI: Soft, NT, ND; BS present.  EXTR : right BKA with LLE edema    LYMPH: No lymphadenopathy noted    SKIN: tattoos UE B/L    ******************************************************************************************    Consultant(s) Notes Reviewed:  [x ] YES  [ ] NO    Discussed with Consultants/Other Providers [ x] YES     **************************** LABS *******************************************************                          8.1    8.59  )-----------( 396      ( 30 Jun 2019 06:23 )             27.3     06-30    142  |  112<H>  |  46<H>  ----------------------------<  68<L>  4.6   |  17  |  5.1<HH>    Ca    7.7<L>      30 Jun 2019 17:14  Mg     1.6     06-30            Lactate Trend    CARDIAC MARKERS ( 30 Jun 2019 17:14 )  x     / x     / 20 U/L / x     / x          CAPILLARY BLOOD GLUCOSE      POCT Blood Glucose.: 76 mg/dL (01 Jul 2019 08:05)          **************************Active Medications *******************************************  sulfamethizole (Unknown)      ALPRAZolam 2 milliGRAM(s) Oral at bedtime  chlorhexidine 4% Liquid 1 Application(s) Topical <User Schedule>  dextrose 5% + sodium chloride 0.45%. 1000 milliLiter(s) IV Continuous <Continuous>  diazepam    Tablet 10 milliGRAM(s) Oral two times a day PRN  dronabinol 2.5 milliGRAM(s) Oral two times a day  gabapentin 200 milliGRAM(s) Oral daily  heparin  Injectable 5000 Unit(s) SubCutaneous every 8 hours  methadone    Tablet 80 milliGRAM(s) Oral every 6 hours  sodium bicarbonate 650 milliGRAM(s) Oral three times a day  testosterone 1% Gel 50 milliGRAM(s) Topical daily      ***************************************************  RADIOLOGY & ADDITIONAL TESTS:    Imaging Personally Reviewed:  [ ] YES  [ ] NO    HEALTH ISSUES - PROBLEM Dx:

## 2019-07-01 NOTE — PROGRESS NOTE ADULT - ASSESSMENT
58 years old male with reported PMH of HTN, chronic back pain, chronic anemia, L1 burst fracture with paraplegia since 1986 (bedbound and wheelchair bound), PAD s/p recent BKA on 6/4, neurogenic bladder with suprapubic catheter presented with weakness and generalized pain    # Acute on chronic normocytic anemia  - Hgb 6 on admission, baseline 8-9  - No signs of active bleeding, rectal exam shows brown stool  - s/p 1 units of PRBC, > HB 8.5 today.   - Active T&S. keep hgb above 7    # Acute kidney injury on CKD 4 with  Hyperkalemia   - stable/   - Possibly due to pre-renal 2/2 dehydration > On D51/2NS  - Kidney US on 6/4 shows no hydronephrosis, bilateral echogenic kidney compatible with medical renal disease  - monitor K level closely.     # NAG metabolic acidosis > stable, added NaHco3.    Trend BMP    Persistent Hypoglycemia ( asymptomatic) > Would r/o adrenal insufficiency.    c/w IVF to D51/2NS   F/U  cortisol level.    # HTN  - Well- controlled  - Monitor off medication for now    # DM  - FS running low.   - HbA1C  5.5    # Chronic back pain with functional paraplegia  - Continue methadone 80mg Q6hrs. I stopped.   - Renally dosed gabapentin (200mg QD)  - PT/Rehab ( refused earlier, agreeable now )    # Anxiety  - Continue Valium , hold on xanax for increased lethargy.       DVT ppx: Heparin subQ  GI ppx: Not indicated  Diet: DASH  Activity: increase as tolerated  Code status: full code  Dispo: acute, pending PT/Rehab consult      #Progress Note Handoff  Pending (specify):  Clinical improvement from SILVINA /hyperkalemia/ hypoglycemia  Family discussion: D/W patient and the mother at bedside.   Disposition: Possible SNF

## 2019-07-01 NOTE — PROGRESS NOTE ADULT - ASSESSMENT
58 years old male with reported PMH of HTN, chronic back pain, chronic anemia, L1 burst fracture with paraplegia since 1986 (bedbound and wheelchair bound), PAD s/p recent BKA on 6/4, neurogenic bladder with suprapubic catheter presented with weakness and generalized pain. Patient seen and examined. Patient complains of pain all over the body and leaking catheter. Ditropan has been ordered after urology consult.       SILVINA on CKD 4  - Possibly due to pre-renal 2/2 dehydration and anima  - Kidney US on 6/4 shows no hydronephrosis, bilateral echogenic kidney compatible with medical renal disease  - Having adequate urine output from suprapubic catheter  - Tolerating oral intake  - Encourage oral hydration  - BMP daily  -low K diet, Insulin and glucose if needed    HTN   -well controlled      HyperKalemia  -4.6 today, f/u blood and urine lytes  -insulin and D5 if increased      Persistent Hypoglycemia ( asymptomatic)  - AM cortisol level sent.  most likely due to methadone overdose    Acute on chronic normocytic anemia; resolved  - Hgb 6 on admission, baseline 8-9  - No signs of active bleeding, rectal exam shows brown stool  - s/p 1 units of PRBC, current Hb stable  - Keep two 18-gauge IVs  - Active T&S. keep Hb > 7, 815 today  - CBC daily    Chronic back pain with functional paraplegia  - Continue methadone 80mg Q6hrs (approved by pain management in last visit), - Renally dosed gabapentin (200mg QD)  - PT/Rehab (patient refused SNF but now agrees with rehab)    DM; was on Januvia but became hypoglycemic. F/u HbA1c : 5.5    Anxiety; valium PRN instead of around the clock    DVT ppx: Heparin subQ  GI ppx: Not indicated  Diet: renally restricted  Activity: increase as tolerated  Code status: full code  Dispo: acute, pending PT/Rehab consult 58 years old male with reported PMH of HTN, chronic back pain, chronic anemia, L1 burst fracture with paraplegia since 1986 (bedbound and wheelchair bound), PAD s/p recent BKA on 6/4, neurogenic bladder with suprapubic catheter presented with weakness and generalized pain. Patient seen and examined. Patient complains of pain all over the body and leaking catheter. Ditropan has been ordered after urology consult.       SILVINA on CKD 4  - Possibly due to pre-renal 2/2 dehydration and anima  - Kidney US on 6/4 shows no hydronephrosis, bilateral echogenic kidney compatible with medical renal disease  - Having adequate urine output from suprapubic catheter  - Tolerating oral intake  - Encourage oral hydration  - BMP daily  -low K diet, Insulin and glucose if needed    HTN   -well controlled      HyperKalemia  -4.6 today, f/u blood and urine lytes  -insulin and D5 if increased    Persistent Hypoglycemia ( asymptomatic)  - AM cortisol level sent.  most likely due to methadone overdose    Acute on chronic normocytic anemia; resolved  - Hgb 6 on admission, baseline 8-9  - No signs of active bleeding, rectal exam shows brown stool  - s/p 1 units of PRBC, current Hb stable  - Keep two 18-gauge IVs  - Active T&S. keep Hb > 7, 8.15 today  - CBC daily    Chronic back pain with functional paraplegia  - Continue methadone 80mg Q6hrs (approved by pain management in last visit), - Renally dosed gabapentin (200mg QD)  - PT/Rehab (patient refused SNF but now agrees with rehab)    DM; was on Januvia but became hypoglycemic. F/u HbA1c : 5.5    Anxiety; valium PRN instead of around the clock    DVT ppx: Heparin subQ  GI ppx: Not indicated  Diet: renally restricted  Activity: increase as tolerated  Code status: full code  Dispo: acute, pending PT/Rehab consult

## 2019-07-02 NOTE — PROGRESS NOTE ADULT - SUBJECTIVE AND OBJECTIVE BOX
Nephrology progress note    Patient is seen and examined, events over the last 24 h noted .  Denies complaints of SOB, on IVF, dry mucous membranes, LE swelling    Allergies:  sulfamethizole (Unknown)    Hospital Medications:   MEDICATIONS  (STANDING):  ALPRAZolam 2 milliGRAM(s) Oral at bedtime  chlorhexidine 4% Liquid 1 Application(s) Topical <User Schedule>  dextrose 5% + sodium chloride 0.45%. 1000 milliLiter(s) (75 mL/Hr) IV Continuous <Continuous>  dronabinol 2.5 milliGRAM(s) Oral two times a day  gabapentin 200 milliGRAM(s) Oral daily  heparin  Injectable 5000 Unit(s) SubCutaneous every 8 hours  methadone    Tablet 80 milliGRAM(s) Oral every 6 hours  sodium bicarbonate 650 milliGRAM(s) Oral three times a day  testosterone 1% Gel 50 milliGRAM(s) Topical daily        VITALS:  T(F): 96 (19 @ 05:27), Max: 98.1 (19 @ 13:41)  HR: 72 (19 @ 05:27)  BP: 135/73 (19 @ 05:27)  RR: 18 (19 @ 05:27)  SpO2: 94% (19 @ 18:15)  Wt(kg): --     @ :  -   @ 07:00  --------------------------------------------------------  IN: 300 mL / OUT: 600 mL / NET: -300 mL     @ 07:01  -   @ 07:00  --------------------------------------------------------  IN: 0 mL / OUT: 400 mL / NET: -400 mL          PHYSICAL EXAM:  Constitutional: NAD  HEENT: anicteric sclera, dry mucous membranes  Neck: No JVD  Respiratory: CTAB, no wheezes, rales or rhonchi  Cardiovascular: S1, S2, RRR  Gastrointestinal: BS+, soft, NT/ND  Extremities: 1+ peripheral edema  Neurological: A/O x 3  : No CVA tenderness.  brown present.   Skin: No rashes  Vascular Access:    LABS:      138  |  110  |  45<H>  ----------------------------<  70  4.3   |  16<L>  |  4.9<HH>  Creatinine Trend: 4.9<--, 5.1<--, 4.8<--, 4.8<--, 4.6<--, 5.0<--    Ca    7.6<L>      2019 21:43                            7.5    10.56 )-----------( 344      ( 2019 21:43 )             25.0       Urine Studies:  Urinalysis Basic - ( 2019 10:20 )    Color: Yellow / Appearance: Cloudy / S.020 / pH:   Gluc:  / Ketone: Negative  / Bili: Negative / Urobili: 0.2   Blood:  / Protein: >=300 / Nitrite: Negative   Leuk Esterase: Small / RBC: 11-25 /HPF / WBC 6-10 /HPF   Sq Epi:  / Non Sq Epi: Moderate /HPF / Bacteria: Moderate /HPF      Chloride, Random Urine: 51 ( @ 23:00)  Sodium, Random Urine: 60.0 mmoL/L ( @ 23:00)  Potassium, Random Urine: 19 mmol/L ( @ 23:00)  Sodium, Random Urine: 64.0 mmoL/L ( @ 10:30)  Creatinine, Random Urine: 35 mg/dL ( @ 10:30)  Osmolality, Random Urine: 286 mos/kg ( @ 10:20)    RADIOLOGY & ADDITIONAL STUDIES:

## 2019-07-02 NOTE — CONSULT NOTE ADULT - SUBJECTIVE AND OBJECTIVE BOX
Chief Complaint:     58 years old male with reported PMH of HTN, chronic back pain, chronic anemia, L1 burst fracture with paraplegia since 1986 (bedbound and wheelchair bound), PAD s/p recent BKA on 6/4, neurogenic bladder with suprapubic catheter (recently changed 3 days ago) presented with weakness and generalized pain. complains of pain being worse to lower back and bilat legs.     Allergies    sulfamethizole (Unknown)    Intolerances        PAST MEDICAL & SURGICAL HISTORY:  Anemia  PAD (peripheral artery disease)  Hypertension  Suprapubic catheter  Pressure ulcer  Diabetes  Lumbar compression fracture  S/P below knee amputation, right  S/P debridement  Toe amputation status, right  H/O hernia repair        SOCIAL HISTORY:  Denies Smoking, Alcohol, or Drug Use    sulfamethizole (Unknown)      PAIN MEDICATIONS:  acetaminophen   Tablet .. 650 milliGRAM(s) Oral once PRN  ALPRAZolam 2 milliGRAM(s) Oral at bedtime  diazepam    Tablet 10 milliGRAM(s) Oral two times a day PRN  dronabinol 2.5 milliGRAM(s) Oral two times a day  gabapentin 200 milliGRAM(s) Oral daily  methadone    Tablet 80 milliGRAM(s) Oral every 6 hours    Heme:  heparin  Injectable 5000 Unit(s) SubCutaneous every 8 hours    Antibiotics:    Cardiovascular:    GI:    Endocrine:  fludroCORTISONE 0.1 milliGRAM(s) Oral daily  testosterone 1% Gel 50 milliGRAM(s) Topical daily    All Other Medications:  chlorhexidine 4% Liquid 1 Application(s) Topical <User Schedule>  dextrose 5% + sodium chloride 0.45%. 1000 milliLiter(s) IV Continuous <Continuous>  sodium bicarbonate 650 milliGRAM(s) Oral three times a day      Vital Signs Last 24 Hrs  T(C): 35.6 (02 Jul 2019 05:27), Max: 36.7 (01 Jul 2019 13:41)  T(F): 96 (02 Jul 2019 05:27), Max: 98.1 (01 Jul 2019 13:41)  HR: 72 (02 Jul 2019 05:27) (72 - 84)  BP: 135/73 (02 Jul 2019 05:27) (118/63 - 135/73)  BP(mean): --  RR: 18 (02 Jul 2019 05:27) (18 - 18)  SpO2: 94% (01 Jul 2019 18:15) (94% - 94%)      07-01-19 @ 07:01  -  07-02-19 @ 07:00  --------------------------------------------------------  IN: 0 mL / OUT: 400 mL / NET: -400 mL    07-02-19 @ 07:01  -  07-02-19 @ 11:08  --------------------------------------------------------  IN: 240 mL / OUT: 0 mL / NET: 240 mL        LABS:                          7.5    10.56 )-----------( 344      ( 01 Jul 2019 21:43 )             25.0     07-01    138  |  110  |  45<H>  ----------------------------<  70  4.3   |  16<L>  |  4.9<HH>    Ca    7.6<L>      01 Jul 2019 21:43            RADIOLOGY:    Drug Screen:        [ ]  [facility]  Reviewed on 7/2/19, 11:15A  Patient Name:	Almas Booker	YOB: 1960  Address:	[address] [place] [address]	Sex:	Male  Rx Written	Rx Dispensed	Drug	Quantity	Days Supply	Prescriber Name  06/20/2019	07/01/2019	testosterone 50 mg/5 gram gel	150gm	30	Chemo Van MD  06/20/2019	07/01/2019	alprazolam 2 mg tablet	30	30	Chemo Van MD  06/20/2019	06/20/2019	methadone hcl 10 mg tablet	960	30	Chemo Van MD  06/20/2019	06/20/2019	oxycodone hcl 30 mg tablet	240	30	Chemo Van MD  06/20/2019	06/20/2019	diazepam 10 mg tablet	60	30	Chemo Van MD  05/13/2019	06/09/2019	alprazolam 2 mg tablet	30	30	Chemo Van MD  05/10/2019	05/16/2019	alprazolam 2 mg tablet	30	30	VanChemo MD  05/13/2019	05/16/2019	diazepam 10 mg tablet	60	30	VanChemo MD  05/07/2019	05/09/2019	methadone hcl 10 mg tablet	960	30	VanChemo MD  05/07/2019	05/08/2019	oxycodone hcl 30 mg tablet	240	30	VanChemo MD  05/07/2019	05/08/2019	testosterone 50 mg/5 gram gel	150gm	30	VanChemo MD  04/09/2019	04/09/2019	methadone hcl 10 mg tablet	960	30	VanChemo MD  04/09/2019	04/09/2019	testosterone 50 mg/5 gram gel	150gm	30	VanChemo MD  04/09/2019	04/09/2019	oxycodone hcl 30 mg tablet	240	30	VanChemo MD  04/09/2019	04/09/2019	alprazolam 2 mg tablet	30	30	VanChemo MD  04/09/2019	04/09/2019	diazepam 10 mg tablet	60	30	VanChemo MD  03/12/2019	03/12/2019	methadone hcl 10 mg tablet	960	30	VanChemo MD  03/12/2019	03/12/2019	oxycodone hcl 30 mg tablet	240	30	VanChemo MD  03/12/2019	03/12/2019	testosterone 50 mg/5 gram gel	150gm	30	VanChemo MD  03/12/2019	03/12/2019	alprazolam 2 mg tablet	30	30	VanChemo MD  03/12/2019	03/12/2019	diazepam 10 mg tablet	60	30	VanChemo MD  02/12/2019	02/12/2019	methadone hcl 10 mg tablet	960	30	VanChemo MD  02/12/2019	02/12/2019	oxycodone hcl 30 mg tablet	240	30	VanChemo MD  02/12/2019	02/12/2019	alprazolam 2 mg tablet	30	30	VanChemo MD  02/12/2019	02/12/2019	diazepam 10 mg tablet	60	30	VanChemo MD  01/15/2019	01/15/2019	methadone hcl 10 mg tablet	960	30	VanChemo MD  01/15/2019	01/15/2019	oxycodone hcl 30 mg tablet	240	30	VanChemo MD  01/15/2019	01/15/2019	alprazolam 2 mg tablet	30	30	VanChemo MD  01/15/2019	01/15/2019	diazepam 10 mg tablet	60	30	VanChemo MD  12/18/2018	12/18/2018	oxycodone hcl 30 mg tablet	240	30	VanChemo MD  12/18/2018	12/18/2018	methadone hcl 10 mg tablet	960	30	VanChemo MD  12/18/2018	12/18/2018	diazepam 10 mg tablet	60	30	VanChemo MD  12/18/2018	12/18/2018	alprazolam 2 mg tablet	30	30	VanChemo MD  11/20/2018	11/20/2018	methadone hcl 10 mg tablet	960	30	VanChemo MD  11/20/2018	11/20/2018	oxycodone hcl 30 mg tablet	240	30	VanChemo MD  11/20/2018	11/20/2018	diazepam 10 mg tablet	60	30	VanChemo MD  11/20/2018	11/20/2018	alprazolam 2 mg tablet	30	30	VanChemo MD  10/18/2018	10/24/2018	methadone hcl 10 mg tablet	960	30	VanChemo MD  10/18/2018	10/24/2018	oxycodone hcl 30 mg tablet	240	30	VanChemo MD  10/18/2018	10/18/2018	diazepam 10 mg tablet	60	30	VanChemo MD  10/18/2018	10/18/2018	alprazolam 2 mg tablet	30	30	VanChemo MD  09/18/2018	09/26/2018	methadone hcl 10 mg tablet	960	30	VanChemo MD  09/18/2018	09/24/2018	oxycodone hcl 30 mg tablet	240	30	VanChemo MD  09/18/2018	09/24/2018	diazepam 10 mg tablet	60	30	VanChemo MD  09/18/2018	09/24/2018	alprazolam 2 mg tablet	30	30	VanChemo MD  08/21/2018	08/28/2018	methadone hcl 10 mg tablet	960	30	VanChemo MD  08/21/2018	08/25/2018	oxycodone hcl 30 mg tablet	240	30	VanChemo MD  07/26/2018	07/30/2018	oxycodone hcl 30 mg tablet	240	30	Chemo Van MD  07/26/2018	07/30/2018	methadone hcl 10 mg tablet	960	30	Chemo Van MD  07/26/2018	07/30/2018	diazepam 10 mg tablet	60	30	Chemo Van MD  07/26/2018	07/30/2018	alprazolam 2 mg tablet	30	30	Chemo Van MD  06/28/2018	07/09/2018	methadone hcl 10 mg tablet	960	30	Chemo Van MD  06/28/2018	07/03/2018	oxycodone hcl 30 mg tablet	240	30	Chemo Van MD  06/28/2018	07/03/2018	alprazolam 2 mg tablet	30	30	Chemo Van MD  06/28/2018	07/03/2018	diazepam 10 mg tablet	60	30	Chemo Van MD

## 2019-07-02 NOTE — PROGRESS NOTE ADULT - SUBJECTIVE AND OBJECTIVE BOX
MARGE BELLA  58y  Male      Patient is a 58y old  Male who presents with a chief complaint of Acute kidney injury, anemia (02 Jul 2019 09:16)      INTERVAL HPI/OVERNIGHT EVENTS:      ******************************* REVIEW OF SYSTEMS:**********************************************  Resting in bed, no acute distress.   All other review of systems negative    *********************** VITALS ******************************************    T(F): 96 (07-02-19 @ 05:27)  HR: 72 (07-02-19 @ 05:27) (72 - 84)  BP: 135/73 (07-02-19 @ 05:27) (118/63 - 135/73)  RR: 18 (07-02-19 @ 05:27) (18 - 18)  SpO2: 94% (07-01-19 @ 18:15) (94% - 94%)    07-01-19 @ 07:01  -  07-02-19 @ 07:00  --------------------------------------------------------  IN: 0 mL / OUT: 400 mL / NET: -400 mL    07-02-19 @ 07:01  -  07-02-19 @ 11:16  --------------------------------------------------------  IN: 240 mL / OUT: 0 mL / NET: 240 mL            07-01-19 @ 07:01  -  07-02-19 @ 07:00  --------------------------------------------------------  IN: 0 mL / OUT: 400 mL / NET: -400 mL    07-02-19 @ 07:01  -  07-02-19 @ 11:16  --------------------------------------------------------  IN: 240 mL / OUT: 0 mL / NET: 240 mL        ******************************** PHYSICAL EXAM:**************************************************  GENERAL: NAD    PSYCH: no agitation, baseline mentation  HEENT:     NERVOUS SYSTEM:  Alert & Oriented X3, found to be drowsy but arousable.     PULMONARY: DESIRAE, CTA    CARDIOVASCULAR: S1S2 RRR    GI: Soft, NT, ND; BS present.    EXTREMITIES: right BKA with LLE edema    LYMPH: No lymphadenopathy noted    SKIN: tattoos UE B/L    ******************************************************************************************      **************************** LABS *******************************************************                          7.5    10.56 )-----------( 344      ( 01 Jul 2019 21:43 )             25.0     07-01    138  |  110  |  45<H>  ----------------------------<  70  4.3   |  16<L>  |  4.9<HH>    Ca    7.6<L>      01 Jul 2019 21:43            Lactate Trend    CARDIAC MARKERS ( 30 Jun 2019 17:14 )  x     / x     / 20 U/L / x     / x          CAPILLARY BLOOD GLUCOSE      POCT Blood Glucose.: 67 mg/dL (02 Jul 2019 10:39)          **************************Active Medications *******************************************  sulfamethizole (Unknown)      acetaminophen   Tablet .. 650 milliGRAM(s) Oral once PRN  ALPRAZolam 2 milliGRAM(s) Oral at bedtime  chlorhexidine 4% Liquid 1 Application(s) Topical <User Schedule>  dextrose 5% + sodium chloride 0.45%. 1000 milliLiter(s) IV Continuous <Continuous>  diazepam    Tablet 10 milliGRAM(s) Oral two times a day PRN  dronabinol 2.5 milliGRAM(s) Oral two times a day  fludroCORTISONE 0.1 milliGRAM(s) Oral daily  gabapentin 200 milliGRAM(s) Oral daily  heparin  Injectable 5000 Unit(s) SubCutaneous every 8 hours  methadone    Tablet 80 milliGRAM(s) Oral every 6 hours  sodium bicarbonate 650 milliGRAM(s) Oral three times a day  testosterone 1% Gel 50 milliGRAM(s) Topical daily      ***************************************************  RADIOLOGY & ADDITIONAL TESTS:    Imaging Personally Reviewed:  [ ] YES  [ ] NO    HEALTH ISSUES - PROBLEM Dx:

## 2019-07-02 NOTE — PROGRESS NOTE ADULT - ASSESSMENT
58 M w/ PMH of Paraplegia 2/2 Lumbar compression Fx, sacral decubiti and heel ulcers s/p debridements,, PAD, suprapubic cath, Neuropathy, DM2 & HTN , Rt gsngrene, sp BKA admitted with s/p fall found to be in SILVINA and have hyperKalemia.    # Oliguric SILVINA w/ Hyperchloremic Metabolic Acidosis   (Cr 4.9, baseline 1.1 in   8/ 2018; 3.5 mg% on June 18,2019)  - due to ATN most likely   -FeNA>1% CPK - 20  - cont NS to 1/2 NS 75 cc/hr (hyperchloremic)  - cont po NA bicarb 650 tid  - kidney and blader sono from 6/4/19 echogenic kidneys w/o hydro c/w CKD - REPEAT kidney and bladder sono  -no need for RRT now, but may need it soon  - check phos, iPTH  -spoke to pt's mother - she does not think he would agree to HD if needed  # Microcytic Anemia  - s/p 2 U PRBCs in ER   plz check iron studies - if adequate - > will need LINDY    #PROTEINURIA - 300, H/o DM  - check spot protein/creat ratio, Hb A1C, C3c4 r/o PIGN  will follow

## 2019-07-02 NOTE — PROGRESS NOTE ADULT - ASSESSMENT
58 years old male with reported PMH of HTN, chronic back pain, chronic anemia, L1 burst fracture with paraplegia since 1986 (bedbound and wheelchair bound), PAD s/p recent BKA on 6/4, neurogenic bladder with suprapubic catheter presented with weakness and generalized pain. Patient seen and examined. Patient again complains of pain all over the body and leaking catheter. Ditropan has been given to the patient. Endocrinology and Pain management consults have been made to explain his symptoms and manage them.    # Acute on chronic normocytic anemia  - Hgb 6 on admission, baseline 8-9  - No signs of active bleeding, rectal exam shows brown stool  - s/p  PRBC, > HB 7.5 today.   - Active T&S. keep hgb above 7    # Acute kidney injury on CKD 4 with  Hyperkalemia   - Stable  - Possibly due to pre-renal 2/2 dehydration > On D51/2NS  - Kidney US on 6/4 shows no hydronephrosis, bilateral echogenic kidney compatible with medical renal disease  - monitor K level  >> NOW stable     # NAG metabolic acidosis > stable, added NaHco3.    Trend BMP    #Pain Management Consult  Patient understood the plan. Patient did get scripts filled on 6/20/19. No scripts for controlled substances are to be given. Team has DC Oxycodone. Will keep the same until patient shows less signs of oversedation from the opioids secondary to SILVINA.     #Persistent Hypoglycemia ( asymptomatic) > Would r/o adrenal insufficiency.    c/w IVF to D51/2NS   cortisol level >> WNL   low dose maintenance  As per Endo: Received multiple bolus of insulin during this admission for hyperkalemia which could have resulted some of these episodes, given his low GFR he is more prone. Will rule out adrenal insufficiency induced by Methadone, with AM serum cortisol (10), will add LH, FSH, TSH, ACTH, ACTH stimulation test  Already started on Fludrocortisone. Continue with iv fluids with D5W.    # HTN  - Well- controlled  - Monitor off medication for now    # DM  - FS running low.   - HbA1C  5.5    # Chronic back pain with functional paraplegia  - Continue methadone 80mg Q6hrs. I stopped.   - Renally dosed gabapentin (200mg QD)  - PT/Rehab ( refused earlier, agreeable now )  - Pain mgmt for high pain regimen and lethargy     # Anxiety  - Continue Valium , hold on xanax for increased lethargy.     DVT ppx: Heparin subQ  GI ppx: Not indicated  Diet: DASH  Activity: increase as tolerated  Code status: full cod

## 2019-07-02 NOTE — PROGRESS NOTE ADULT - SUBJECTIVE AND OBJECTIVE BOX
58 years old male with reported PMH of HTN, chronic back pain, chronic anemia, L1 burst fracture with paraplegia since 1986 (bedbound and wheelchair bound), PAD s/p recent BKA on 6/4, neurogenic bladder with suprapubic catheter (recently changed 3 days ago) presented with weakness and generalized pain. History was also obtained from patient's mother at bedside. Patient was found to be on the floor this morning by his mother. Patient states that he was trying to transfer himself from bed to his wheelchair but he fell on the floor. Patient denies head trauma or LOC. His mother later found him and called the ambulance. Patient was recently admitted to [facility] for RLE gangrene and had BKA done on 6/4. Patient was discharged on 6/21 to SNF initially but patient refused, so he was discharged home instead. Patient has fallen at home 3 times since his discharge. Patient reports weakness, bilateral shoulder pain, and chronic low back pain. Patient denies fever/chills, cough, chest pain, shortness of breath, abdominal pain, nausea/vomiting, diarrhea/constipation. Patient was found to be anemic with hemoglobin of 6 (baseline 8-9) and elevated creatinine of 4.8 (baseline 0.8 in 2018, 3.8 in 2019).    In ED: 1 unit PRBC, 1L NS (26 Jun 2019 20:12)    PAST MEDICAL & SURGICAL HISTORY:  Anemia  PAD (peripheral artery disease)  Hypertension  Suprapubic catheter  Pressure ulcer  Diabetes  Lumbar compression fracture  S/P below knee amputation, right  S/P debridement  Toe amputation status, right  H/O hernia repair      OVERNIGHT EVENTS:    SUBJECTIVE / INTERVAL HPI: Patient seen and examined at bedside.     VITAL SIGNS:  Vital Signs Last 24 Hrs  T(C): 35.9 (02 Jul 2019 13:56), Max: 36.1 (01 Jul 2019 18:15)  T(F): 96.6 (02 Jul 2019 13:56), Max: 96.9 (01 Jul 2019 18:15)  HR: 80 (02 Jul 2019 13:56) (72 - 80)  BP: 130/74 (02 Jul 2019 13:56) (118/63 - 135/73)  BP(mean): --  RR: 17 (02 Jul 2019 13:56) (17 - 18)  SpO2: 94% (01 Jul 2019 18:15) (94% - 94%)    PHYSICAL EXAM:    General: WDWN  HEENT: NC/AT; PERRL, anicteric sclera; MMM  Neck: supple  Cardiovascular: +S1/S2, RRR  Respiratory: CTA B/L; no W/R/R  Gastrointestinal: soft, NT/ND; +BSx4  Extremities: WWP; no edema, clubbing or cyanosis  Vascular: 2+ radial, DP/PT pulses B/L  Neurological: AAOx3; no focal deficits    MEDICATIONS:  MEDICATIONS  (STANDING):  ALPRAZolam 2 milliGRAM(s) Oral at bedtime  chlorhexidine 4% Liquid 1 Application(s) Topical <User Schedule>  dextrose 5% + sodium chloride 0.45%. 1000 milliLiter(s) (75 mL/Hr) IV Continuous <Continuous>  dronabinol 2.5 milliGRAM(s) Oral two times a day  fludroCORTISONE 0.1 milliGRAM(s) Oral daily  gabapentin 200 milliGRAM(s) Oral daily  heparin  Injectable 5000 Unit(s) SubCutaneous every 8 hours  methadone    Tablet 80 milliGRAM(s) Oral every 6 hours  sodium bicarbonate 650 milliGRAM(s) Oral three times a day  testosterone 1% Gel 50 milliGRAM(s) Topical daily    MEDICATIONS  (PRN):  acetaminophen   Tablet .. 650 milliGRAM(s) Oral once PRN Moderate Pain (4 - 6)  diazepam    Tablet 10 milliGRAM(s) Oral two times a day PRN agitation      ALLERGIES:  Allergies    sulfamethizole (Unknown)    Intolerances        LABS:                        7.5    10.56 )-----------( 344      ( 01 Jul 2019 21:43 )             25.0     07-01    138  |  110  |  45<H>  ----------------------------<  70  4.3   |  16<L>  |  4.9<HH>    Ca    7.6<L>      01 Jul 2019 21:43          CAPILLARY BLOOD GLUCOSE      POCT Blood Glucose.: 69 mg/dL (02 Jul 2019 11:47)      RADIOLOGY & ADDITIONAL TESTS: Reviewed.

## 2019-07-02 NOTE — CONSULT NOTE ADULT - ASSESSMENT
REVIEW OF SYSTEMS    General:	NAD   Skin/Breast:	Neg  Ophthalmologic:	Neg  ENMT: Neg	  Respiratory and Thorax: Neg	  Cardiovascular:	HTN  Gastrointestinal:	Neg  Genitourinary:	Suprapubic cath  Musculoskeletal:	Chronic back pain and decubiti ulcers, Tissue necrosis to foot. Right BKA  Neurological:	Neg  Psychiatric:	Neg  Hematology/Lymphatics:	Neg  Endocrine:	DM          PHYSICAL EXAM:    GENERAL: NAD, well-groomed, well-developed  HEAD:  Atraumatic, Normocephalic  EYES: EOMI, PERRLA, conjunctiva and sclera clear, pupils 1 cm  ENMT: No tonsillar erythema, exudates, or enlargement; Moist mucous membranes, Good dentition, No lesions  NECK: Supple, No JVD, Normal thyroid  NERVOUS SYSTEM:  Alert & Oriented X3, but very sleepy. patient fell asleep while talking to me and could not remember doing so.   CHEST/LUNG: Clear to percussion bilaterally; No rales, rhonchi, wheezing, or rubs  HEART: Regular rate and rhythm; No murmurs, rubs, or gallops  ABDOMEN: Soft, Nontender, Nondistended; Bowel sounds present  EXTREMITIES: No clubbing, cyanosis, or edema  LYMPH: No lymphadenopathy noted  SKIN: No rashes or lesions      Patient lying in bed supine. patient states his worst pains are to his lower back and bilat legs. Patient states pain is 8/10 now. Pain is described as sharp, constant, non-radiating, better with pain medication, Nothing specific makes the pain worse.   Discussed pain regimen with patient and explained how he is very sleepy and his kidneys are not doing well. Therefore we cut down on the Methadone. Patient understood the plan. Patient did get scripts filled on 6/20/19. No scripts for controlled substances are to be given. Team has DC Oxycodone. Will keep the same until patient shows less signs of oversedation from the opioids secondary to SILVINA.

## 2019-07-02 NOTE — CONSULT NOTE ADULT - ASSESSMENT
58 years old male with reported PMH of HTN, chronic back pain, chronic anemia, L1 burst fracture with paraplegia since 1986 (bedbound and wheelchair bound), PAD s/p recent BKA on 6/4, neurogenic bladder with suprapubic catheter (recently changed 3 days ago) presented with weakness and generalized pain, after mechanical fall. Also had SILVINA on CKD 4 on admission and anemia. Endocrine consulted for recurrent hypoglycemia:    # SILVINA on CKD 4/Anemia  # Methadone dependence  # Recurrent hypoglycemia:    He took medication for Diabetes, Januvia/Metformin for 1-2 months only and stopped it few months back, Hba1c in our system was 5.5 and 6.1, unclear if he ever had real diagnosis of DM  Received multiple bolus of insulin during this admission for hyperkalemia which could have resulted some of these episodes, given his low GFR he is more prone  Will rule out adrenal insufficiency induced by Methadone, with AM serum cortisol (10), will add LH, FSH, TSH, ACTH, ACTH stimulation test  Already started on Fludrocortisone  Continue with iv fluids with D5W    ATTENDING NOTE TO FOLLOW 58 years old male with reported PMH of HTN, chronic back pain, chronic anemia, L1 burst fracture with paraplegia since 1986 (bedbound and wheelchair bound), PAD s/p recent BKA on 6/4, neurogenic bladder with suprapubic catheter (recently changed 3 days ago) presented with weakness and generalized pain, after mechanical fall. Also had SILVINA on CKD 4 on admission and anemia. Endocrine consulted for recurrent hypoglycemia:    # SILVINA on CKD 4/Anemia  # Methadone dependence  # Recurrent hypoglycemia:    He took medication for Diabetes, Januvia/Metformin for 1-2 months only and stopped it few months back, Hba1c in our system was 5.5 and 6.1, unclear if he ever had real diagnosis of DM  Received multiple bolus of insulin during this admission for hyperkalemia which could have resulted some of these episodes, given his low GFR he is more prone  Will rule out adrenal insufficiency induced by Methadone, with AM serum cortisol (10), will add LH, FSH, TSH, ACTH, ACTH stimulation test  Already started on Fludrocortisone  Continue with iv fluids with D5W    ATTENDING NOTE TO FOLLOW:  Possible adrenal insufficiency in this patient with hyperkalemia (but also with significant nephropathy) and asymptomatic hypoglycemia.  Florinef (just begun) may help with hyperkalemia, but would not improve hypoglycemia.  Needs ACTH-stim test to better assess adrenal status.

## 2019-07-02 NOTE — PROGRESS NOTE ADULT - ASSESSMENT
58 years old male with reported PMH of HTN, chronic back pain, chronic anemia, L1 burst fracture with paraplegia since 1986 (bedbound and wheelchair bound), PAD s/p recent BKA on 6/4, neurogenic bladder with suprapubic catheter presented with weakness and generalized pain    # Acute on chronic normocytic anemia  - Hgb 6 on admission, baseline 8-9  - No signs of active bleeding, rectal exam shows brown stool  - s/p  PRBC, > HB 7.5 today.   - Active T&S. keep hgb above 7    # Acute kidney injury on CKD 4 with  Hyperkalemia   - Stable  - Possibly due to pre-renal 2/2 dehydration > On D51/2NS  - Kidney US on 6/4 shows no hydronephrosis, bilateral echogenic kidney compatible with medical renal disease  - monitor K level  >> NOW stable     # NAG metabolic acidosis > stable, added NaHco3.    Trend BMP    Persistent Hypoglycemia ( asymptomatic) > Would r/o adrenal insufficiency.    c/w IVF to D51/2NS     cortisol level >> WNL  Consider low dose maintenance Fludrocortisone if needed.      # HTN  - Well- controlled  - Monitor off medication for now    # DM  - FS running low.   - HbA1C  5.5    # Chronic back pain with functional paraplegia  - Continue methadone 80mg Q6hrs. I stopped.   - Renally dosed gabapentin (200mg QD)  - PT/Rehab ( refused earlier, agreeable now )  - Pain mgmt for high pain regimen and lethargy     # Anxiety  - Continue Valium , hold on xanax for increased lethargy.       DVT ppx: Heparin subQ  GI ppx: Not indicated  Diet: DASH  Activity: increase as tolerated  Code status: full code      #Progress Note Handoff  Pending (specify):  Pain mgmt  Family discussion: D/W patient and the mother at bedside.   Disposition: Possible SNF

## 2019-07-02 NOTE — CONSULT NOTE ADULT - PROBLEM SELECTOR RECOMMENDATION 9
Change Acetaminophen   Tablet .. 650 milliGRAM(s) Oral once Jori  Con't ALPRAZolam 2 milliGRAM(s) Oral at bedtime  Con't Diazepam    Tablet 10 milliGRAM(s) Oral two times a day PRN  Con't Gabapentin 200 milliGRAM(s) Oral daily  Change Methadone  Tablet 80mg PO Q8hrs hours Jori  Lidoderm patches x2 to right side and left of lower back 12 hrs on/off  Warm compress to lower back Q1hr x20 mins PRN

## 2019-07-03 NOTE — PROGRESS NOTE ADULT - ASSESSMENT
58 years old male with reported PMH of HTN, chronic back pain, chronic anemia, L1 burst fracture with paraplegia since 1986 (bedbound and wheelchair bound), PAD s/p recent BKA on 6/4, neurogenic bladder with suprapubic catheter presented with weakness and generalized pain. Patient seen and examined. Patient seems lethargic and is incoherent. His blood sugar fingersticks have been low this morning, dextrose has been increased from 75 to 125 ml/hr.     # Acute on chronic normocytic anemia  - Hgb 6 on admission, baseline 8-9  - No signs of active bleeding, rectal exam shows brown stool  - s/p  PRBC, > HB 7.5   -Stool guaic ordered for cause of anaemia  - Active T&S. keep hgb above 7    # Acute kidney injury on CKD 4 with  Hyperkalemia   - Stable  - Possibly due to pre-renal 2/2 dehydration > On D51/2NS  - Kidney US on 6/4 shows no hydronephrosis, bilateral echogenic kidney compatible with medical renal disease  - monitor K level  > 4.4   - Followed by nephrology    # NAG metabolic acidosis > stable, stopped NaHco3.    Trend BMP    #Pain Management Consult  Patient going to be monitored after de escalating pain meds    #Persistent Hypoglycemia ( asymptomatic)    Would r/o adrenal insufficiency.   Cortisol levels normal    cortisol level >> WNL   low dose maintenance steroid started    # HTN  - Well- controlled  - Monitor off medication for now 155/70    # DM  - FS running low as of yet  - HbA1C  5.5    # Chronic back pain with functional paraplegia  - Continue methadone 80mg Q6hrs.   - Renally dosed gabapentin (200mg QD)      # Anxiety  - Continue Valium , hold on xanax for increased lethargy.     DVT ppx: Heparin subQ  GI ppx: Not indicated  Diet: DASH  Activity: increase as tolerated  Code status: full code

## 2019-07-03 NOTE — PROGRESS NOTE ADULT - SUBJECTIVE AND OBJECTIVE BOX
Nephrology progress note    Patient is seen and examined, events over the last 24 h noted .    Allergies:  sulfamethizole (Unknown)    Hospital Medications:   MEDICATIONS  (STANDING):  ALPRAZolam 2 milliGRAM(s) Oral at bedtime  dextrose 40% Gel 15 Gram(s) Oral once  dextrose 5% + sodium chloride 0.45%. 1000 milliLiter(s) (75 mL/Hr) IV Continuous <Continuous>  dronabinol 2.5 milliGRAM(s) Oral two times a day  fludroCORTISONE 0.1 milliGRAM(s) Oral daily  gabapentin 200 milliGRAM(s) Oral daily  heparin  Injectable 5000 Unit(s) SubCutaneous every 8 hours  methadone    Tablet 80 milliGRAM(s) Oral three times a day  testosterone 1% Gel 50 milliGRAM(s) Topical daily        VITALS:  T(F): 97 (19 @ 05:37), Max: 97 (19 @ 05:37)  HR: 77 (19 @ 05:37)  BP: 155/76 (19 @ 05:37)  RR: 18 (19 @ 05:37)  SpO2: --  Wt(kg): --     @ 07:01  -  02 @ 07:00  --------------------------------------------------------  IN: 0 mL / OUT: 400 mL / NET: -400 mL     @ 07:01  -   @ 07:00  --------------------------------------------------------  IN: 480 mL / OUT: 300 mL / NET: 180 mL          PHYSICAL EXAM:  Constitutional: NAD  HEENT: anicteric sclera, oropharynx clear, MMM  Neck: No JVD  Respiratory: CTAB, no wheezes, rales or rhonchi  Cardiovascular: S1, S2, RRR  Gastrointestinal: BS+, soft, NT/ND  Extremities: No cyanosis or clubbing. No peripheral edema  :  No brown.   Skin: No rashes    LABS:      140  |  108  |  46<H>  ----------------------------<  72  4.4   |  19  |  5.0<HH>  Creatinine Trend: 5.0<--, 4.9<--, 5.1<--, 4.8<--, 4.8<--, 4.6<--  Ca    7.8<L>      2019 16:05                            7.5    10.56 )-----------( 344      ( 2019 21:43 )             25.0       Urine Studies:  Urinalysis Basic - ( 2019 10:20 )    Color: Yellow / Appearance: Cloudy / S.020 / pH:   Gluc:  / Ketone: Negative  / Bili: Negative / Urobili: 0.2   Blood:  / Protein: >=300 / Nitrite: Negative   Leuk Esterase: Small / RBC: 11-25 /HPF / WBC 6-10 /HPF   Sq Epi:  / Non Sq Epi: Moderate /HPF / Bacteria: Moderate /HPF      Chloride, Random Urine: 51 ( @ 23:00)  Sodium, Random Urine: 60.0 mmoL/L ( @ 23:00)  Potassium, Random Urine: 19 mmol/L ( @ 23:00)  Sodium, Random Urine: 64.0 mmoL/L ( @ 10:30)  Creatinine, Random Urine: 35 mg/dL ( @ 10:30)  Osmolality, Random Urine: 286 mos/kg ( @ 10:20)    RADIOLOGY & ADDITIONAL STUDIES: Nephrology progress note    Patient is seen and examined, events over the last 24 h noted .  lethargic confused     Allergies:  sulfamethizole (Unknown)    Hospital Medications:   MEDICATIONS  (STANDING):  ALPRAZolam 2 milliGRAM(s) Oral at bedtime  dextrose 40% Gel 15 Gram(s) Oral once  dextrose 5% + sodium chloride 0.45%. 1000 milliLiter(s) (75 mL/Hr) IV Continuous <Continuous>  dronabinol 2.5 milliGRAM(s) Oral two times a day  fludroCORTISONE 0.1 milliGRAM(s) Oral daily  gabapentin 200 milliGRAM(s) Oral daily  heparin  Injectable 5000 Unit(s) SubCutaneous every 8 hours  methadone    Tablet 80 milliGRAM(s) Oral three times a day  testosterone 1% Gel 50 milliGRAM(s) Topical daily        VITALS:  T(F): 97 (19 @ 05:37), Max: 97 (19 @ 05:37)  HR: 77 (19 @ 05:37)  BP: 155/76 (19 @ 05:37)  RR: 18 (19 @ 05:37)  SpO2: --  Wt(kg): --     @ 07:  -  02 @ 07:00  --------------------------------------------------------  IN: 0 mL / OUT: 400 mL / NET: -400 mL     @ :  -   @ 07:00  --------------------------------------------------------  IN: 480 mL / OUT: 300 mL / NET: 180 mL          PHYSICAL EXAM:  Constitutional: lethargic   HEENT: anicteric sclera, oropharynx clear, MMM  Neck: No JVD  Respiratory: Crackles at base   Cardiovascular: S1, S2, RRR  Gastrointestinal: BS+, soft, NT/ND  Extremities: No cyanosis or clubbing.   :  No brown.   Skin: No rashes    LABS:            140  |  108  |  46<H>  ----------------------------<  72  4.4   |  19  |  5.0<HH>  Creatinine Trend: 5.0<--, 4.9<--, 5.1<--, 4.8<--, 4.8<--, 4.6<--  Ca    7.8<L>      2019 16:05                            7.5    10.56 )-----------( 344      ( 2019 21:43 )             25.0     % Saturation, Iron: 51 % (19 @ 06:46)        Urine Studies:  Urinalysis Basic - ( 2019 10:20 )    Color: Yellow / Appearance: Cloudy / S.020 / pH:   Gluc:  / Ketone: Negative  / Bili: Negative / Urobili: 0.2   Blood:  / Protein: >=300 / Nitrite: Negative   Leuk Esterase: Small / RBC: 11-25 /HPF / WBC 6-10 /HPF   Sq Epi:  / Non Sq Epi: Moderate /HPF / Bacteria: Moderate /HPF      Chloride, Random Urine: 51 ( @ 23:00)  Sodium, Random Urine: 60.0 mmoL/L ( @ 23:00)  Potassium, Random Urine: 19 mmol/L ( @ 23:00)  Sodium, Random Urine: 64.0 mmoL/L ( @ 10:30)  Creatinine, Random Urine: 35 mg/dL ( @ 10:30)  Osmolality, Random Urine: 286 mos/kg ( @ 10:20)    RADIOLOGY & ADDITIONAL STUDIES:  < from: US Kidney and Bladder (19 @ 11:05) >    IMPRESSION:    No hydronephrosis.    Bilateral echogenic kidneys, correlate with renal function.    Renal cysts on the right measuring up to 1.5 cm and a left renal 1.3 cm   cyst.    < end of copied text >

## 2019-07-03 NOTE — PROGRESS NOTE ADULT - ASSESSMENT
58 M w/ PMH of Paraplegia 2/2 Lumbar compression Fx, sacral decubiti and heel ulcers s/p debridements,, PAD, suprapubic cath, Neuropathy, DM2 & HTN , Rt gsngrene, sp BKA admitted with s/p fall found to be in SILVINA and have hyperKalemia.    # Oliguric SILVINA w/ Hyperchloremic Metabolic Acidosis   (Cr 4.9, baseline 1.1 in   8/ 2018; 3.5 mg% on June 18,2019)  - due to ATN most likely   -FeNA>1% CPK - 20  - cont IVF current   - repeat BMP from today   - cont po NA bicarb 650 tid  - kidney and blader sono from 6/4/19 echogenic kidneys with repeat from yesterday showing no evidence of obstruction / echogenic kidneys / ? ATN   -no need for RRT now, but may need it soon  - check phos, iPTH  -spoke to pt's mother - she does not think he would agree to HD if needed  # Microcytic Anemia  - s/p 2 U PRBCs in ER   plz check iron studies / in 6/19 patient Fe indices were high  -  may need LINDY    #PROTEINURIA - 300, H/o DM  - check spot protein/creat ratio, Hb A1C, C3c4 r/o PIGN    will follow

## 2019-07-03 NOTE — PROGRESS NOTE ADULT - SUBJECTIVE AND OBJECTIVE BOX
Pt known to , has chronic, indwelling SPT. Pt changes SPT himself, last time was just a few days back.  asked to see pt regarding leaking SPT. Pt seen at bedside, SPT irrigated pt had copious amount of white sediment in bladder, likely clogging Posadas eyelets and causing urine to leak around the catheter. Bladder was irrigated with 250 cc sterile water until clear, pt tolerated procedure well    plan  -obtain ucx from catheter  - irrigate SPT q shift to prevent from clogging  - anticholinergic for bladder spasms    recall prn Pt known to , has chronic, indwelling SPT. Pt changes SPT himself, last time was just a few days back.  asked to see pt regarding leaking SPT. Pt seen at bedside, SPT irrigated pt had copious amount of white sediment in bladder, likely clogging Posadas eyelets and causing urine to leak around the catheter. Bladder was irrigated with 250 cc sterile water until clear, pt tolerated procedure well    plan  -obtain ucx from catheter  - irrigate SPT q shift to prevent from clogging    recall prn

## 2019-07-03 NOTE — PROGRESS NOTE ADULT - ASSESSMENT
58 years old male with reported PMH of HTN, chronic back pain, chronic anemia, L1 burst fracture with paraplegia since 1986 (bedbound and wheelchair bound), PAD s/p recent BKA on 6/4, neurogenic bladder with suprapubic catheter presented with weakness and generalized pain    # Acute on chronic normocytic anemia  - Hgb 6 on admission, baseline 8-9  - No signs of active bleeding, rectal exam shows brown stool  - repeat labs/ active T+S, may need therapeutic transfusion  - repeat iron studies, may need LINDY/ appreciated renal f/u    # Oliguric SILVINA on CKD (now stage 4) w/ Hyperchloremic Metabolic Acidosis   (Cr 4.9, baseline 1.1 in   8/ 2018; 3.5 mg% on June 18,2019)  - due to ATN most likely   -FeNA>1% CPK - 20  - cont IVF current   - repeat BMP from today   - cont po NA bicarb 650 tid  - Stable  - Kidney US on 6/4 shows no hydronephrosis, bilateral echogenic kidney compatible with medical renal disease  - monitor K level       Persistent Hypoglycemia ( asymptomatic) > Would r/o adrenal insufficiency.    c/w IVF to D51/2NS     cortisol level >> WNL  - acth stim test  empiric flourinef  appreciated endo recs    # HTN  - Well- controlled  - Monitor off medication for now    # DM2  - FS running low.   - HbA1C  5.5    # Chronic back pain with functional paraplegia  - suspect a component of toxic encephalopathy 2/2 polypharmacy and poor renal clearance  slowly deescalating methadone and gabapentin doseages    # Anxiety  - deescalating benzos      DVT ppx: Heparin subQ  GI ppx: Not indicated  Diet: DASH  Activity: increase as tolerated  Code status: full code      #Progress Note Handoff  Pending (specify): improvement in FS's/ mentation/ oral intake  Family discussion: D/W patient and the mother at bedside  Disposition: Possible SNF

## 2019-07-03 NOTE — PROGRESS NOTE ADULT - SUBJECTIVE AND OBJECTIVE BOX
MARGE BELLA  58y  Male      Patient is a 58y old  Male who presents with a chief complaint of Acute kidney injury, anemia (03 Jul 2019 09:24)      INTERVAL HPI/OVERNIGHT EVENTS: patient reports poor sleep overnight as an explanation of why he is so tired today. he denies palpitations or diaphoresis associated with some low FS's lately. oral intake is reportedly poor. unreliable historian      REVIEW OF SYSTEMS:  endorses bereavement over passing of his father  All other review of systems negative    T(C): 36.1 (07-03-19 @ 13:19), Max: 36.1 (07-03-19 @ 05:37)  HR: 77 (07-03-19 @ 13:19) (77 - 78)  BP: 130/71 (07-03-19 @ 13:19) (130/70 - 155/76)  RR: 17 (07-03-19 @ 13:19) (17 - 18)  SpO2: 100% (07-03-19 @ 09:10) (100% - 100%)  Wt(kg): --Vital Signs Last 24 Hrs  T(C): 36.1 (03 Jul 2019 13:19), Max: 36.1 (03 Jul 2019 05:37)  T(F): 96.9 (03 Jul 2019 13:19), Max: 97 (03 Jul 2019 05:37)  HR: 77 (03 Jul 2019 13:19) (77 - 78)  BP: 130/71 (03 Jul 2019 13:19) (130/70 - 155/76)  BP(mean): --  RR: 17 (03 Jul 2019 13:19) (17 - 18)  SpO2: 100% (03 Jul 2019 09:10) (100% - 100%)      07-02-19 @ 07:01  -  07-03-19 @ 07:00  --------------------------------------------------------  IN: 480 mL / OUT: 300 mL / NET: 180 mL    07-03-19 @ 07:01  -  07-03-19 @ 17:37  --------------------------------------------------------  IN: 1520 mL / OUT: 200 mL / NET: 1320 mL        PHYSICAL EXAM:  GENERAL: NAD, tattoos, anasarca  PSYCH: no agitation, lethargic but arousable  NERVOUS SYSTEM:  Alert & Oriented X2 but lethargic, no new focal deficits, paraplegia  PULMONARY: Clear to percussion bilaterally; No rales, rhonchi, wheezing, or rubs  CARDIOVASCULAR: Regular rate and rhythm; No murmurs, rubs, or gallops  GI: Soft, Nontender, Nondistended; Bowel sounds present   suprapubic cath no sediment, with surrounding fungal rash/ satellite lesions  EXTREMITIES:  2+ Peripheral Pulses, No clubbing, cyanosis, or edema, R BKA  SKIN no pallor    Consultant(s) Notes Reviewed:  [x ] YES  [ ] NO    Discussed with Consultants/Other Providers [ x] YES     LABS                          7.5    10.56 )-----------( 344      ( 01 Jul 2019 21:43 )             25.0     07-02    140  |  108  |  46<H>  ----------------------------<  72  4.4   |  19  |  5.0<HH>    Ca    7.8<L>      02 Jul 2019 16:05            Lactate Trend        CAPILLARY BLOOD GLUCOSE      POCT Blood Glucose.: 90 mg/dL (03 Jul 2019 16:25)        RADIOLOGY & ADDITIONAL TESTS:    Imaging Personally Reviewed:  [ ] YES  [ ] NO    HEALTH ISSUES - PROBLEM Dx:  Lumbago without sciatica: Lumbago without sciatica          #Progress Note Handoff    Pending (specify):  Consults_________, Tests________, Test Results_______, Other_________    Family discussion:    Disposition: Home___/SNF___/Other________/Unknown at this time________

## 2019-07-03 NOTE — PROGRESS NOTE ADULT - SUBJECTIVE AND OBJECTIVE BOX
58 years old male with reported PMH of HTN, chronic back pain, chronic anemia, L1 burst fracture with paraplegia since 1986 (bedbound and wheelchair bound), PAD s/p recent BKA on 6/4, neurogenic bladder with suprapubic catheter (recently changed 3 days ago) presented with weakness and generalized pain. History was also obtained from patient's mother at bedside. Patient was found to be on the floor this morning by his mother. Patient states that he was trying to transfer himself from bed to his wheelchair but he fell on the floor. Patient denies head trauma or LOC. His mother later found him and called the ambulance. Patient was recently admitted to [facility] for RLE gangrene and had BKA done on 6/4. Patient was discharged on 6/21 to SNF initially but patient refused, so he was discharged home instead. Patient has fallen at home 3 times since his discharge. Patient reports weakness, bilateral shoulder pain, and chronic low back pain. Patient denies fever/chills, cough, chest pain, shortness of breath, abdominal pain, nausea/vomiting, diarrhea/constipation. Patient was found to be anemic with hemoglobin of 6 (baseline 8-9) and elevated creatinine of 4.8 (baseline 0.8 in 2018, 3.8 in 2019).    In ED: 1 unit PRBC, 1L NS (26 Jun 2019 20:12)    PAST MEDICAL & SURGICAL HISTORY:  Anemia  PAD (peripheral artery disease)  Hypertension  Suprapubic catheter  Pressure ulcer  Diabetes  Lumbar compression fracture  S/P below knee amputation, right  S/P debridement  Toe amputation status, right  H/O hernia repair    OVERNIGHT EVENTS:    SUBJECTIVE / INTERVAL HPI: Patient seen and examined at bedside.     VITAL SIGNS:  Vital Signs Last 24 Hrs  T(C): 36.1 (03 Jul 2019 05:37), Max: 36.1 (03 Jul 2019 05:37)  T(F): 97 (03 Jul 2019 05:37), Max: 97 (03 Jul 2019 05:37)  HR: 77 (03 Jul 2019 05:37) (77 - 80)  BP: 155/76 (03 Jul 2019 05:37) (130/70 - 155/76)  BP(mean): --  RR: 18 (03 Jul 2019 05:37) (17 - 18)  SpO2: 100% (03 Jul 2019 09:10) (100% - 100%)    PHYSICAL EXAM:    General: WDWN  HEENT: NC/AT; PERRL, anicteric sclera; MMM  Neck: supple  Cardiovascular: +S1/S2, RRR  Respiratory: CTA B/L; no W/R/R  Gastrointestinal: soft, NT/ND; +BSx4  Extremities: WWP; no edema, clubbing or cyanosis  Vascular: 2+ radial, DP/PT pulses B/L  Neurological: AAOx3; no focal deficits    MEDICATIONS:  MEDICATIONS  (STANDING):  ALPRAZolam 2 milliGRAM(s) Oral at bedtime  chlorhexidine 4% Liquid 1 Application(s) Topical <User Schedule>  dextrose 40% Gel 15 Gram(s) Oral once  dextrose 5% + sodium chloride 0.45%. 1000 milliLiter(s) (125 mL/Hr) IV Continuous <Continuous>  dronabinol 2.5 milliGRAM(s) Oral two times a day  fludroCORTISONE 0.1 milliGRAM(s) Oral daily  gabapentin 200 milliGRAM(s) Oral daily  heparin  Injectable 5000 Unit(s) SubCutaneous every 8 hours  methadone    Tablet 80 milliGRAM(s) Oral three times a day  testosterone 1% Gel 50 milliGRAM(s) Topical daily    MEDICATIONS  (PRN):  acetaminophen   Tablet .. 650 milliGRAM(s) Oral once PRN Moderate Pain (4 - 6)  diazepam    Tablet 10 milliGRAM(s) Oral two times a day PRN agitation      ALLERGIES:  Allergies    sulfamethizole (Unknown)    Intolerances        LABS:                        7.5    10.56 )-----------( 344      ( 01 Jul 2019 21:43 )             25.0     07-02    140  |  108  |  46<H>  ----------------------------<  72  4.4   |  19  |  5.0<HH>    Ca    7.8<L>      02 Jul 2019 16:05          CAPILLARY BLOOD GLUCOSE      POCT Blood Glucose.: 61 mg/dL (03 Jul 2019 08:26)      RADIOLOGY & ADDITIONAL TESTS: Reviewed.

## 2019-07-04 NOTE — PROGRESS NOTE ADULT - SUBJECTIVE AND OBJECTIVE BOX
MARGE BELLA  58y  Male      Patient is a 58y old  Male who presents with a chief complaint of Acute kidney injury, anemia (04 Jul 2019 11:21)      INTERVAL HPI/OVERNIGHT EVENTS: patient a bit more interactive today. says his suprapubic pain improved after flushing of his SPC. had an episode of nbnb vomit x 1 today, and still having very poor oral intake. currently denies abdominal pain or nausea      REVIEW OF SYSTEMS:  as above  All other review of systems negative    T(C): 35.6 (07-04-19 @ 05:14), Max: 36.1 (07-03-19 @ 13:19)  HR: 74 (07-04-19 @ 05:14) (74 - 80)  BP: 129/72 (07-04-19 @ 05:14) (129/72 - 132/68)  RR: 18 (07-04-19 @ 05:14) (17 - 18)  SpO2: --  Wt(kg): --Vital Signs Last 24 Hrs  T(C): 35.6 (04 Jul 2019 05:14), Max: 36.1 (03 Jul 2019 13:19)  T(F): 96 (04 Jul 2019 05:14), Max: 96.9 (03 Jul 2019 13:19)  HR: 74 (04 Jul 2019 05:14) (74 - 80)  BP: 129/72 (04 Jul 2019 05:14) (129/72 - 132/68)  BP(mean): --  RR: 18 (04 Jul 2019 05:14) (17 - 18)  SpO2: --      07-03-19 @ 07:01  -  07-04-19 @ 07:00  --------------------------------------------------------  IN: 2395 mL / OUT: 1525 mL / NET: 870 mL        PHYSICAL EXAM:  GENERAL: a bit less lethargic, deconditioned  PSYCH: no agitation, baseline mentation  NERVOUS SYSTEM:  Alert & Oriented X3, no new focal deficits, functional paraplegia  PULMONARY: Clear to percussion bilaterally; No rales, rhonchi, wheezing, or rubs  CARDIOVASCULAR: Regular rate and rhythm; No murmurs, rubs, or gallops  GI: Soft, Nontender, Nondistended; Bowel sounds present   suprapubic cath some sediment in tube, surrounding fungal rash  EXTREMITIES:  2+ Peripheral Pulses, No clubbing, cyanosis, or edema    Consultant(s) Notes Reviewed:  [x ] YES  [ ] NO    Discussed with Consultants/Other Providers [ x] YES     LABS                          7.5    9.90  )-----------( 326      ( 04 Jul 2019 06:36 )             24.8     07-04    139  |  109  |  42<H>  ----------------------------<  86  4.0   |  17  |  5.0<HH>    Ca    7.5<L>      04 Jul 2019 06:36            Lactate Trend        CAPILLARY BLOOD GLUCOSE      POCT Blood Glucose.: 94 mg/dL (04 Jul 2019 11:46)        RADIOLOGY & ADDITIONAL TESTS:    Imaging Personally Reviewed:  [ ] YES  [ ] NO    HEALTH ISSUES - PROBLEM Dx:  Lumbago without sciatica: Lumbago without sciatica

## 2019-07-04 NOTE — PROGRESS NOTE ADULT - ASSESSMENT
58 years old male with reported PMH of HTN, chronic back pain, chronic anemia, L1 burst fracture with paraplegia since 1986 (bedbound and wheelchair bound), PAD s/p recent BKA on 6/4, neurogenic bladder with suprapubic catheter presented with weakness and generalized pain    # Acute on chronic normocytic anemia  - Hgb 6 on admission, baseline 8-9  - No signs of active bleeding, rectal exam shows brown stool  - repeat labs/ active T+S, may need therapeutic transfusion if hgb drops below 7  - repeat iron studies, may need LINDY/ appreciated renal f/u    # Oliguric SILVINA on CKD (now stage 4) w/ Hyperchloremic Metabolic Acidosis   (Cr 4.9, baseline 1.1 in   8/ 2018; 3.5 mg% on June 18,2019)  - due to ATN most likely   -FeNA>1% CPK - 20  - cont IVF current   - repeat BMP from today   - cont po NA bicarb 650 tid  - Stable  - Kidney US on 6/4 shows no hydronephrosis, bilateral echogenic kidney compatible with medical renal disease  - monitor K level       Persistent Hypoglycemia ( asymptomatic) > Would r/o adrenal insufficiency.    c/w IVF to D51/2NS     cortisol level >> WNL  - acth stim test marginally abnormal, discussed with endocrinology attending, repeat ACTH, start dexamethasone 0.5 mg po qd  already on florinef      # HTN  - Well- controlled  - Monitor off medication for now    # DM2?  - FS running low; also poor oral intake  - HbA1C  5.5    # Chronic back pain with functional paraplegia  - suspect a component of toxic encephalopathy 2/2 polypharmacy and poor renal clearance  slowly deescalating methadone and gabapentin doseages    # Anxiety  - deescalating benzos      DVT ppx: Heparin subQ  GI ppx: Not indicated  Diet: DASH  Activity: increase as tolerated  Code status: full code      #Progress Note Handoff  Pending (specify): improvement in FS's/ mentation/ oral intake  Family discussion: D/W patient and the mother at bedside  Disposition: Possible SNF 58 years old male with reported PMH of HTN, chronic back pain, chronic anemia, L1 burst fracture with paraplegia since 1986 (bedbound and wheelchair bound), PAD s/p recent BKA on 6/4, neurogenic bladder with suprapubic catheter presented with weakness and generalized pain    # Acute on chronic normocytic anemia  - Hgb 6 on admission, baseline 8-9  - No signs of active bleeding, rectal exam shows brown stool  - repeat labs/ active T+S, may need therapeutic transfusion if hgb drops below 7  - repeat iron studies, may need LINDY/ appreciated renal f/u    # Oliguric SILVINA on CKD (now stage 4) w/ Hyperchloremic Metabolic Acidosis   (Cr 4.9, baseline 1.1 in   8/ 2018; 3.5 mg% on June 18,2019)  - due to ATN most likely   -FeNA>1% CPK - 20  - cont IVF current   - repeat BMP from today   - cont po NA bicarb 650 tid  - Stable  - Kidney US on 6/4 shows no hydronephrosis, bilateral echogenic kidney compatible with medical renal disease  - monitor K level     #Dysuria  improved after irrigation of SPC  patient changed SPC several days prior to admission at home  check UA/UCx, r/o CAUTI (this is patient's catheter FROM HOME)  irrigate 100cc sterile water q shift    Persistent Hypoglycemia ( asymptomatic) > Would r/o adrenal insufficiency.    c/w IVF to D51/2NS     cortisol level >> WNL  - acth stim test marginally abnormal, discussed with endocrinology attending, repeat ACTH, start dexamethasone 0.5 mg po qd  already on florinef      # HTN  - Well- controlled  - Monitor off medication for now    # DM2?  - FS running low; also poor oral intake  - HbA1C  5.5    # Chronic back pain with functional paraplegia  - suspect a component of toxic encephalopathy 2/2 polypharmacy and poor renal clearance  slowly deescalating methadone and gabapentin doseages    # Anxiety  - deescalating benzos      DVT ppx: Heparin subQ  GI ppx: Not indicated  Diet: DASH  Activity: increase as tolerated  Code status: full code      #Progress Note Handoff  Pending (specify): improvement in FS's/ mentation/ oral intake; f/u UA  Family discussion: D/W patient and the mother at bedside  Disposition: Possible SNF

## 2019-07-04 NOTE — PROGRESS NOTE ADULT - SUBJECTIVE AND OBJECTIVE BOX
58 years old male with reported PMH of HTN, chronic back pain, chronic anemia, L1 burst fracture with paraplegia since 1986 (bedbound and wheelchair bound), PAD s/p recent BKA on 6/4, neurogenic bladder with suprapubic catheter (recently changed 3 days ago) presented with weakness and generalized pain. History was also obtained from patient's mother at bedside. Patient was found to be on the floor this morning by his mother. Patient states that he was trying to transfer himself from bed to his wheelchair but he fell on the floor. Patient denies head trauma or LOC. His mother later found him and called the ambulance. Patient was recently admitted to [facility] for RLE gangrene and had BKA done on 6/4. Patient was discharged on 6/21 to SNF initially but patient refused, so he was discharged home instead. Patient has fallen at home 3 times since his discharge. Patient reports weakness, bilateral shoulder pain, and chronic low back pain. Patient denies fever/chills, cough, chest pain, shortness of breath, abdominal pain, nausea/vomiting, diarrhea/constipation. Patient was found to be anemic with hemoglobin of 6 (baseline 8-9) and elevated creatinine of 4.8 (baseline 0.8 in 2018, 3.8 in 2019).    PAST MEDICAL & SURGICAL HISTORY:  Anemia  PAD (peripheral artery disease)  Hypertension  Suprapubic catheter  Pressure ulcer  Diabetes  Lumbar compression fracture  S/P below knee amputation, right  S/P debridement  Toe amputation status, right  H/O hernia repair      OVERNIGHT EVENTS:    SUBJECTIVE / INTERVAL HPI: Patient seen and examined at bedside.     VITAL SIGNS:  Vital Signs Last 24 Hrs  T(C): 35.8 (04 Jul 2019 14:28), Max: 36 (03 Jul 2019 20:36)  T(F): 96.5 (04 Jul 2019 14:28), Max: 96.8 (03 Jul 2019 20:36)  HR: 81 (04 Jul 2019 14:28) (74 - 81)  BP: 131/70 (04 Jul 2019 14:28) (129/72 - 132/68)  BP(mean): --  RR: 18 (04 Jul 2019 14:28) (18 - 18)  SpO2: --    PHYSICAL EXAM:    General: WDWN  HEENT: NC/AT; PERRL, anicteric sclera; MMM  Neck: supple  Cardiovascular: +S1/S2, RRR  Respiratory: CTA B/L; no W/R/R  Gastrointestinal: soft, NT/ND; +BSx4  Extremities: WWP; no edema, clubbing or cyanosis  Vascular: 2+ radial, DP/PT pulses B/L  Neurological: AAOx3; no focal deficits    MEDICATIONS:  MEDICATIONS  (STANDING):  chlorhexidine 4% Liquid 1 Application(s) Topical <User Schedule>  dextrose 40% Gel 15 Gram(s) Oral once  dextrose 5% + sodium chloride 0.45%. 1000 milliLiter(s) (125 mL/Hr) IV Continuous <Continuous>  dronabinol 2.5 milliGRAM(s) Oral two times a day  fludroCORTISONE 0.1 milliGRAM(s) Oral daily  gabapentin 200 milliGRAM(s) Oral daily  heparin  Injectable 5000 Unit(s) SubCutaneous every 8 hours  methadone    Tablet 40 milliGRAM(s) Oral three times a day  nystatin Cream 1 Application(s) Topical two times a day  sodium bicarbonate 650 milliGRAM(s) Oral three times a day  testosterone 1% Gel 50 milliGRAM(s) Topical daily    MEDICATIONS  (PRN):  acetaminophen   Tablet .. 650 milliGRAM(s) Oral once PRN Moderate Pain (4 - 6)  ALPRAZolam 0.5 milliGRAM(s) Oral at bedtime PRN insomnia      ALLERGIES:  Allergies    sulfamethizole (Unknown)    Intolerances        LABS:                        7.5    9.90  )-----------( 326      ( 04 Jul 2019 06:36 )             24.8     07-04    139  |  109  |  42<H>  ----------------------------<  86  4.0   |  17  |  5.0<HH>    Ca    7.5<L>      04 Jul 2019 06:36          CAPILLARY BLOOD GLUCOSE      POCT Blood Glucose.: 94 mg/dL (04 Jul 2019 11:46)      RADIOLOGY & ADDITIONAL TESTS: Reviewed.

## 2019-07-04 NOTE — PROGRESS NOTE ADULT - ASSESSMENT
Pt with paraplagia, BKA. creatinine not improving. sono, no obstruction. UOP noted, nonoliguric. no indication for RRT at this point. to discuss with family, should renal function worsen.

## 2019-07-04 NOTE — PROGRESS NOTE ADULT - ASSESSMENT
58 years old male with reported PMH of HTN, chronic back pain, chronic anemia, L1 burst fracture with paraplegia since 1986 (bedbound and wheelchair bound), PAD s/p recent BKA on 6/4, neurogenic bladder with suprapubic catheter presented with weakness and generalized pain.  Patient seen and examined. A rash has developed around the APT area, nystatin ordered.     # Acute on chronic normocytic anemia  - Hgb 6 on admission, baseline 8-9  - No signs of active bleeding, rectal exam shows brown stool  - repeat labs/ active T+S, may need therapeutic transfusion if hgb drops below 7  -following iron study reults    # Oliguric SILVINA on CKD (now stage 4) deveopled in past 6 months   w/ Hyperchloremic Metabolic Acidosis   (Cr 4.9, baseline 1.1 in   8/ 2018; 3.5 mg% on June 18,2019)  nephrology rec:  no indication for RRT at this point. to discuss with family, should renal function worsen.   -FeNA>1% CPK - 20  - continuing po NA bicarb 650 tid  - Stable Cr  - Kidney US on 6/4 shows no hydronephrosis, bilateral echogenic kidney compatible with medical renal disease  - monitor K level     #Dysuria  urology was consulted  improved after irrigation of SPC  patient reported that he has been changed SPC several days prior to admission at home and has been going up and down sizes whenever it leaked   UA/UCx sent, r/o CAUTI  irrigate 100cc sterile water q shift    Persistent Hypoglycemia ( asymptomatic)    r/o adrenal insufficiency.    c/w IVF to D51/2NS   cortisol level >> WNL  - acth stim test marginally abnormal,  repeating ACTH, start dexamethasone 0.5 mg po qd  already on florinef      # HTN  - Well- controlled  - Monitor off medication for now    # DM2  - FS running low; also poor oral intake  - HbA1C  5.5    # Chronic back pain with functional paraplegia  -Pain management on board  -slowly deescalating methadone and gabapentin doseages    # Anxiety  - deescalating benzos      DVT ppx: Heparin subQ  GI ppx: Not indicated  Diet: DASH  Activity: increase as tolerated  Code status: full code

## 2019-07-04 NOTE — PROGRESS NOTE ADULT - SUBJECTIVE AND OBJECTIVE BOX
Nephrology progress note    Patient is seen and examined, events over the last 24 h noted .    Allergies:  sulfamethizole (Unknown)    Hospital Medications:   MEDICATIONS  (STANDING):  chlorhexidine 4% Liquid 1 Application(s) Topical <User Schedule>  dextrose 40% Gel 15 Gram(s) Oral once  dextrose 5% + sodium chloride 0.45%. 1000 milliLiter(s) (125 mL/Hr) IV Continuous <Continuous>  dronabinol 2.5 milliGRAM(s) Oral two times a day  fludroCORTISONE 0.1 milliGRAM(s) Oral daily  gabapentin 200 milliGRAM(s) Oral daily  heparin  Injectable 5000 Unit(s) SubCutaneous every 8 hours  methadone    Tablet 40 milliGRAM(s) Oral three times a day  nystatin Cream 1 Application(s) Topical two times a day  sodium bicarbonate 650 milliGRAM(s) Oral three times a day  testosterone 1% Gel 50 milliGRAM(s) Topical daily        VITALS:  T(F): 96.5 (07-04-19 @ 14:28), Max: 96.8 (07-03-19 @ 20:36)  HR: 81 (07-04-19 @ 14:28)  BP: 131/70 (07-04-19 @ 14:28)  RR: 18 (07-04-19 @ 14:28)  SpO2: --  Wt(kg): --    07-02 @ 07:01  -  07-03 @ 07:00  --------------------------------------------------------  IN: 480 mL / OUT: 300 mL / NET: 180 mL    07-03 @ 07:01  -  07-04 @ 07:00  --------------------------------------------------------  IN: 2395 mL / OUT: 1525 mL / NET: 870 mL    07-04 @ 07:01  -  07-04 @ 14:43  --------------------------------------------------------  IN: 0 mL / OUT: 500 mL / NET: -500 mL          PHYSICAL EXAM:  Constitutional: NAD  HEENT: anicteric sclera, oropharynx clear, MMM  Neck: No JVD  Respiratory: CTAB, no wheezes, rales or rhonchi  Cardiovascular: S1, S2, RRR  Gastrointestinal: BS+, soft, NT/ND  Extremities: No cyanosis or clubbing. No peripheral edema  :  No brown.   Skin: No rashes    LABS:  07-04    139  |  109  |  42<H>  ----------------------------<  86  4.0   |  17  |  5.0<HH>    Ca    7.5<L>      04 Jul 2019 06:36                            7.5    9.90  )-----------( 326      ( 04 Jul 2019 06:36 )             24.8       Urine Studies:    Chloride, Random Urine: 51 (07-01 @ 23:00)  Sodium, Random Urine: 60.0 mmoL/L (07-01 @ 23:00)  Potassium, Random Urine: 19 mmol/L (07-01 @ 23:00)    RADIOLOGY & ADDITIONAL STUDIES:

## 2019-07-04 NOTE — PROGRESS NOTE ADULT - ASSESSMENT
Mild adrenal insufficiency.  Draw ACTH level.  After ACTH drawn can begin dexamethasone 0.5 mg [id number] (can continue florinef)

## 2019-07-05 NOTE — PROGRESS NOTE ADULT - SUBJECTIVE AND OBJECTIVE BOX
seen and examined  no distress  lying comfortable         Standing Inpatient Medications  chlorhexidine 4% Liquid 1 Application(s) Topical <User Schedule>  dexamethasone     Tablet 0.5 milliGRAM(s) Oral daily  dextrose 40% Gel 15 Gram(s) Oral once  dextrose 5% + sodium chloride 0.45%. 1000 milliLiter(s) IV Continuous <Continuous>  dronabinol 2.5 milliGRAM(s) Oral two times a day  fludroCORTISONE 0.1 milliGRAM(s) Oral daily  gabapentin 200 milliGRAM(s) Oral daily  heparin  Injectable 5000 Unit(s) SubCutaneous every 8 hours  methadone    Tablet 40 milliGRAM(s) Oral three times a day  nystatin Cream 1 Application(s) Topical two times a day  sodium bicarbonate 650 milliGRAM(s) Oral three times a day  testosterone 1% Gel 50 milliGRAM(s) Topical daily        VITALS/PHYSICAL EXAM  --------------------------------------------------------------------------------  T(C): 35.6 (07-05-19 @ 04:32), Max: 35.8 (07-04-19 @ 14:28)  HR: 62 (07-05-19 @ 04:32) (62 - 82)  BP: 120/59 (07-05-19 @ 04:32) (120/59 - 147/80)  RR: 18 (07-05-19 @ 04:32) (18 - 18)  SpO2: --  Wt(kg): --        07-04-19 @ 07:01  -  07-05-19 @ 07:00  --------------------------------------------------------  IN: 0 mL / OUT: 2800 mL / NET: -2800 mL      Physical Exam:  	Gen: NAD  	Pulm: CTA B/L  	CV: S1S2; no rub  	Abd: +distended  	: SPC  	LE: bka       LABS/STUDIES  --------------------------------------------------------------------------------              8.1    10.89 >-----------<  352      [07-05-19 @ 06:14]              26.6     139  |  109  |  42  ----------------------------<  86      [07-04-19 @ 06:36]  4.0   |  17  |  5.0        Ca     7.5     [07-04-19 @ 06:36]      Creatinine Trend:  SCr 5.0 [07-04 @ 06:36]  SCr 5.0 [07-02 @ 16:05]  SCr 4.9 [07-01 @ 21:43]  SCr 5.1 [06-30 @ 17:14]  SCr 4.8 [06-30 @ 06:23]    Urinalysis - [06-27-19 @ 10:20]      Color Yellow / Appearance Cloudy / SG 1.020 / pH 6.5      Gluc Negative / Ketone Negative  / Bili Negative / Urobili 0.2       Blood Moderate / Protein >=300 / Leuk Est Small / Nitrite Negative      RBC 11-25 / WBC 6-10 / Hyaline  / Gran  / Sq Epi  / Non Sq Epi Moderate / Bacteria Moderate    Urine Sodium 60.0      [07-01-19 @ 23:00]  Urine Potassium 19      [07-01-19 @ 23:00]  Urine Chloride 51      [07-01-19 @ 23:00]    Iron 39, TIBC 77, %sat 51      [06-19-19 @ 06:46]  PTH -- (Ca 7.8)      [07-02-19 @ 10:54]   71  HbA1c 5.5      [06-27-19 @ 06:35]

## 2019-07-05 NOTE — CONSULT NOTE ADULT - SUBJECTIVE AND OBJECTIVE BOX
58y  Male  HPI:  58 years old male with reported PMH of HTN, chronic back pain, chronic anemia, L1 burst fracture with paraplegia since 1986 (bedbound and wheelchair bound), PAD s/p recent BKA on 6/4, neurogenic bladder with suprapubic catheter (recently changed 3 days ago) presented with weakness and generalized pain. History was also obtained from patient's mother at bedside. Patient was found to be on the floor this morning by his mother. Patient states that he was trying to transfer himself from bed to his wheelchair but he fell on the floor. Patient denies head trauma or LOC. His mother later found him and called the ambulance. Patient was recently admitted to [facility] for RLE gangrene and had BKA done on 6/4. Patient was discharged on 6/21 to SNF initially but patient refused, so he was discharged home instead. Patient has fallen at home 3 times since his discharge. Patient reports weakness, bilateral shoulder pain, and chronic low back pain. Patient denies fever/chills, cough, chest pain, shortness of breath, abdominal pain, nausea/vomiting, diarrhea/constipation. Patient was found to be anemic with hemoglobin of 6 (baseline 8-9) and elevated creatinine of 4.8 (baseline 0.8 in 2018, 3.8 in 2019).    In ED: 1 unit PRBC, 1L NS (26 Jun 2019 20:12)    Hospital course***  Allergies    sulfamethizole (Unknown)    Intolerances      PAST MEDICAL & SURGICAL HISTORY:  Anemia  PAD (peripheral artery disease)  Hypertension  Suprapubic catheter  Pressure ulcer  Diabetes  Lumbar compression fracture  S/P below knee amputation, right  S/P debridement  Toe amputation status, right  H/O hernia repair      Labs:                        8.1    10.89 )-----------( 352      ( 05 Jul 2019 06:14 )             26.6     07-05    141  |  111<H>  |  39<H>  ----------------------------<  65<L>  3.8   |  18  |  4.5<HH>    Ca    7.5<L>      05 Jul 2019 06:14        Culture - Urine (collected 04 Jul 2019 13:10)  Source: .Urine Catheterized  Final Report (05 Jul 2019 22:02):    <10,000 CFU/mL Normal Urogenital Lyubov        PE:  PHYSICAL EXAM:  AAO x3  Full thickness wounds to sacrum and b/l buttocks, 100% granulation tissue, small serosang dc  no swelling, no erythema

## 2019-07-05 NOTE — CHART NOTE - NSCHARTNOTEFT_GEN_A_CORE
Registered Dietitian Follow-Up     Patient Profile Reviewed                           Yes [x]   No []     Nutrition History Previously Obtained        Yes [x]  No []       Pertinent Subjective Information: Documented po intake 40-80% per EMR. Pt reports throat pain is affecting his po intake. Denies need for soft diet, but receptive to trial nepro for adequate nutrition.      Pertinent Medical Interventions: Gi consulted - Vomiting: Likely metabolic. Oliguric SILVINA on CKD (now stage 4) developed in past 6 months w/ Hyperchloremic Metabolic Acidosis. Cr 4.9, baseline 1.1 in 8/ 2018. Persistent Hypoglycemia ( asymptomatic). No indication for RRT at this point per Renal      Diet order: Renal restrictions, high fiber     Anthropometrics:  - Ht.  - Wt. 81.5 kg (7/5) vs 80.3 kg (7/1) relatively stable, will monitor weight changes   - %wt change  - BMI 22.6 adjusted for R BKA,  - IBW: 84kg     Pertinent Lab Data: (7/5) H/H 8.1/26.6  BUN 30 Cr 4.5  glucose 65     Pertinent Meds: decadron, sodium bicarbonate. marinol     Physical Findings:  - Appearance: alert  - GI function: LBM 7/3, vomit 2 days ago per MD  - Tubes:  - Oral/Mouth cavity: Issue swallowing d/t throat pain - pt reports he avoids "big foods"   - Skin: stage 4 PU to buttocks, stage 3 PU sacrum and R buttocks      Nutrition Requirements  Weight Used: needs cont from RD assessment 6/28      Estimated Energy Needs    Continue [x]  Adjust [] 1993 - 2159 kcals/day (MSJ x 1.2 - 1.3 AF for PU)  Adjusted Energy Recommendations:   kcal/day        Estimated Protein Needs    Continue [x]  Adjust [] 68 - 84  gms/day (0.9 - 1.1 gm/kg for PU and renal fxn) will adjust prn   Adjusted Protein Recommendations:   gm/day        Estimated Fluid Needs        Continue [x]  Adjust [] 1ml/kcal or per LIP  Adjusted Fluid Recommendations:   mL/day     Nutrient Intake: not meeting needs        [] Previous Nutrition Diagnosis: Increased Nutrient Needs            [x] Ongoing          [] Resolved    [] No active nutrition diagnosis identified at this time     NEW Nutrition Diagnostic #2  Problem: Inadequate oral intake  Etiology: decreased appetite 2/2 throat pain  Statement: documented po intake 40-80%      Nutrition Intervention meal/snack, med food supplement      Goal/Expected Outcome: Pt to consume >75% of meal tray in the next 4 days      Indicator/Monitoring: RD to monitor energy intke, diet order. body comp, NFPF, renal/glucose profile     Recommendation: Order Nepro daily. Cont renal, high fiber diet.

## 2019-07-05 NOTE — PROGRESS NOTE ADULT - SUBJECTIVE AND OBJECTIVE BOX
58 years old male with reported PMH of HTN, chronic back pain, chronic anemia, L1 burst fracture with paraplegia since 1986 (bedbound and wheelchair bound), PAD s/p recent BKA on 6/4, neurogenic bladder with suprapubic catheter (recently changed 3 days ago) presented with weakness and generalized pain. History was also obtained from patient's mother at bedside. Patient was found to be on the floor this morning by his mother. Patient states that he was trying to transfer himself from bed to his wheelchair but he fell on the floor. Patient denies head trauma or LOC. His mother later found him and called the ambulance. Patient was recently admitted to [facility] for RLE gangrene and had BKA done on 6/4. Patient was discharged on 6/21 to SNF initially but patient refused, so he was discharged home instead. Patient has fallen at home 3 times since his discharge. Patient reports weakness, bilateral shoulder pain, and chronic low back pain. Patient denies fever/chills, cough, chest pain, shortness of breath, abdominal pain, nausea/vomiting, diarrhea/constipation. Patient was found to be anemic with hemoglobin of 6 (baseline 8-9) and elevated creatinine of 4.8 (baseline 0.8 in 2018, 3.8 in 2019).    PAST MEDICAL & SURGICAL HISTORY:  Anemia  PAD (peripheral artery disease)  Hypertension  Suprapubic catheter  Pressure ulcer  Diabetes  Lumbar compression fracture  S/P below knee amputation, right  S/P debridement  Toe amputation status, right  H/O hernia repair      OVERNIGHT EVENTS:    SUBJECTIVE / INTERVAL HPI: Patient seen and examined at bedside.     VITAL SIGNS:  Vital Signs Last 24 Hrs  T(C): 35.8 (05 Jul 2019 13:14), Max: 35.8 (04 Jul 2019 20:57)  T(F): 96.5 (05 Jul 2019 13:14), Max: 96.5 (05 Jul 2019 13:14)  HR: 82 (05 Jul 2019 13:14) (62 - 82)  BP: 160/84 (05 Jul 2019 13:14) (120/59 - 160/84)  BP(mean): --  RR: 18 (05 Jul 2019 13:14) (18 - 18)  SpO2: --    PHYSICAL EXAM:    General: WDWN  HEENT: NC/AT; PERRL, anicteric sclera; MMM  Neck: supple  Cardiovascular: +S1/S2, RRR  Respiratory: CTA B/L; no W/R/R  Gastrointestinal: soft, NT/ND; +BSx4  Extremities: WWP; no edema, clubbing or cyanosis  Vascular: 2+ radial, DP/PT pulses B/L  Neurological: AAOx3; no focal deficits    MEDICATIONS:  MEDICATIONS  (STANDING):  chlorhexidine 4% Liquid 1 Application(s) Topical <User Schedule>  dexamethasone     Tablet 0.5 milliGRAM(s) Oral daily  dextrose 40% Gel 15 Gram(s) Oral once  dextrose 5% + sodium chloride 0.45%. 1000 milliLiter(s) (125 mL/Hr) IV Continuous <Continuous>  dronabinol 2.5 milliGRAM(s) Oral two times a day  epoetin kecia Injectable 5000 Unit(s) SubCutaneous every 7 days  fludroCORTISONE 0.1 milliGRAM(s) Oral daily  gabapentin 200 milliGRAM(s) Oral daily  heparin  Injectable 5000 Unit(s) SubCutaneous every 8 hours  methadone    Tablet 40 milliGRAM(s) Oral three times a day  nystatin Cream 1 Application(s) Topical two times a day  sodium bicarbonate 650 milliGRAM(s) Oral three times a day  testosterone 1% Gel 50 milliGRAM(s) Topical daily    MEDICATIONS  (PRN):  acetaminophen   Tablet .. 650 milliGRAM(s) Oral once PRN Moderate Pain (4 - 6)  ALPRAZolam 0.5 milliGRAM(s) Oral at bedtime PRN insomnia      ALLERGIES:  Allergies    sulfamethizole (Unknown)    Intolerances        LABS:                        8.1    10.89 )-----------( 352      ( 05 Jul 2019 06:14 )             26.6     07-05    141  |  111<H>  |  39<H>  ----------------------------<  65<L>  3.8   |  18  |  4.5<HH>    Ca    7.5<L>      05 Jul 2019 06:14          CAPILLARY BLOOD GLUCOSE      POCT Blood Glucose.: 141 mg/dL (05 Jul 2019 11:49)      RADIOLOGY & ADDITIONAL TESTS: Reviewed.

## 2019-07-05 NOTE — SWALLOW BEDSIDE ASSESSMENT ADULT - SWALLOW EVAL: FUNCTIONAL LEVEL AT TIME OF EVAL
Pt reported episode of vomiting after eating, but stated that he did not have any swallowing issues today.
pt c/o pain in stomach and inability to use the bathroom. Pt reported vomiting after eating today. RN notified.

## 2019-07-05 NOTE — CONSULT NOTE ADULT - ASSESSMENT
ASSESSMENT:  Stage IV pressure ulcer to sacrum and b/l buttocks, chronic  no sign of infection    RECOMMENDATION:  Wound care - Wet To Dry Kerlex packing w/ 1/4 Dakins / DPD BID  No Surgical debridement required at this time  Offloading/ positional changes

## 2019-07-05 NOTE — PROGRESS NOTE ADULT - ASSESSMENT
58 M w/ PMH of Paraplegia 2/2 Lumbar compression Fx, sacral decubiti and heel ulcers s/p debridements,, PAD, suprapubic cath, Neuropathy, DM2 & HTN , Rt gsngrene, sp BKA admitted with s/p fall found to be in SILVINA and have hyperKalemia.  #creatinine stable, repeat today  # patient with significant decrease in GFR in view of BKA  # if creatinine does not improve, will need tesio and initiation of RRT/ no acute indication   # ? benefit of kidney biopsy  # check c3,c4, JOSE D, DNA, K/L ratio, IF, hepatitis profile, ANCA   # check pr/cr ratio  # continue sodium bicarbonate po  # check ph level  # h/h noted, no venofer elevated sat, start procrit 5000 units s/c weekly  # will follow

## 2019-07-05 NOTE — PROGRESS NOTE ADULT - ASSESSMENT
58 years old male with reported PMH of HTN, chronic back pain, chronic anemia, L1 burst fracture with paraplegia since 1986 (bedbound and wheelchair bound), PAD s/p recent BKA on 6/4, neurogenic bladder with suprapubic catheter presented with weakness and generalized pain.  Patient seen and examined. Patient looks better and responsive. the rash around the SPT looks improved with nystatin usage.    # Acute on chronic normocytic anemia  - Hgb 6 on admission, baseline 8-9  - No signs of active bleeding, rectal exam shows brown stool  - repeat labs/ active T+S, may need therapeutic transfusion if hgb drops below 7  -following iron study results  -Procrit 5000 units subcut/week started    # Oliguric SILVINA on CKD (now stage 4) deveopled in past 6 months   w/ Hyperchloremic Metabolic Acidosis   (Cr 4.9, baseline 1.1 in   8/ 2018; 3.5 mg% on June 18,2019)  nephrology rec:  no indication for RRT at this point. -FeNA>1% CPK - 20  - continuing po NA bicarb 650 tid  - Stable Cr  - Kidney US on 6/4 shows no hydronephrosis, bilateral echogenic kidney compatible with medical renal disease  - monitor K level     #Nausea and Vomiting  GI consulted for esophagogram due to low finger sticks  GI suggested it to be metabolic in origin   Patient refused EGD/colonoscopy possibility    #Dysuria  improved after irrigation of SPC  patient reported that he has been changed SPC several days prior to admission at home and has been going up and down sizes whenever it leaked   UA/UCx sent, r/o CAUTI  irrigate 100cc sterile water q shift    Persistent Hypoglycemia ( asymptomatic)    r/o adrenal insufficiency.    - acth stim test marginally abnormal,  repeated ACTH, start dexamethasone 0.5 mg po qd  already on florinef  -    # HTN  - Well- controlled  - Monitor off medication for now    # DM2  - FS running low; also poor oral intake  - HbA1C  5.5    # Chronic back pain with functional paraplegia  -Pain management on board  -slowly deescalating methadone and gabapentin doseages    # Anxiety  - deescalating benzos      DVT ppx: Heparin subQ  GI ppx: Not indicated  Diet: DASH  Activity: increase as tolerated  Code status: full code 58 years old male with reported PMH of HTN, chronic back pain, chronic anemia, L1 burst fracture with paraplegia since 1986 (bedbound and wheelchair bound), PAD s/p recent BKA on 6/4, neurogenic bladder with suprapubic catheter presented with weakness and generalized pain.  Day 9 in hospital. Patient seen and examined. Patient looks better and responsive. the rash around the SPT looks improved with nystatin usage.    #Pain Med de-escalation  Shall change from methadone 40mg tid to bid tomorrow  Consulted neurology c3,c4, JOSE D, DNA, K/L ratio, hepatitis profile, ANCA, pr/cr ratio sent    # Acute on chronic normocytic anemia  - Hgb 6 on admission, baseline 8-9  - No signs of active bleeding, rectal exam shows brown stool  - repeat labs/ active T+S, may need therapeutic transfusion if hgb drops below 7  -following iron study results  -Procrit 5000 units subcut/week started    # Oliguric SILVINA on CKD (now stage 4) deveopled in past 6 months   w/ Hyperchloremic Metabolic Acidosis   (Cr 4.9, baseline 1.1 in   8/ 2018; 3.5 mg% on June 18,2019)  nephrology rec:  no indication for RRT at this point. -FeNA>1% CPK - 20  - continuing po NA bicarb 650 tid  - Stable Cr  - Kidney US on 6/4 shows no hydronephrosis, bilateral echogenic kidney compatible with medical renal disease  - monitor K level     #Pressure ulcers  On sacrum, Rt and Lt buttock  consulted burn    #Foot ulcer  Podiatry consulted      #Nausea and Vomiting  GI consulted for esophagogram due to low finger sticks  GI suggested it to be metabolic in origin   Patient refused EGD/colonoscopy possibility    #Dysuria  improved after irrigation of SPC  patient reported that he has been changed SPC several days prior to admission at home and has been going up and down sizes whenever it leaked  UA/UCx sent, r/o CAUTI  irrigate 100cc sterile water q shift    #Persistent Hypoglycemia ( asymptomatic)    r/o adrenal insufficiency.    - acth stim test marginally abnormal,  repeated ACTH, start dexamethasone 0.5 mg po qd  already on florinef  -    # HTN  - Well- controlled  - Monitor off medication for now    # DM2  - FS running low; also poor oral intake  - HbA1C  5.5    # Chronic back pain with functional paraplegia  -Pain management on board  -slowly deescalating methadone and gabapentin doseages    # Anxiety  - deescalating benzos      DVT ppx: Heparin subQ  GI ppx: Not indicated  Diet: DASH  Activity: increase as tolerated  Code status: full code 58 years old male with reported PMH of HTN, chronic back pain, chronic anemia, L1 burst fracture with paraplegia since 1986 (bedbound and wheelchair bound), PAD s/p recent BKA on 6/4, neurogenic bladder with suprapubic catheter presented with weakness and generalized pain.  Day 9 in hospital. Patient seen and examined. Patient looks better and responsive. the rash around the SPT looks improved with nystatin usage.    #Pain Med de-escalation  Shall change from methadone 40mg tid to bid tomorrow  Consulted nephrology c3,c4, JOSE D, DNA, K/L ratio, hepatitis profile, ANCA, pr/cr ratio sent    # Acute on chronic normocytic anemia  - Hgb 6 on admission, baseline 8-9  - No signs of active bleeding, rectal exam shows brown stool  - repeat labs/ active T+S, may need therapeutic transfusion if hgb drops below 7  -following iron study results  -Procrit 5000 units subcut/week started    # Oliguric SILVINA on CKD (now stage 4) deveopled in past 6 months   w/ Hyperchloremic Metabolic Acidosis   (Cr 4.9, baseline 1.1 in   8/ 2018; 3.5 mg% on June 18,2019)  nephrology rec:  no indication for RRT at this point. -FeNA>1% CPK - 20  - continuing po NA bicarb 650 tid  - Stable Cr  - Kidney US on 6/4 shows no hydronephrosis, bilateral echogenic kidney compatible with medical renal disease  - monitor K level     #Pressure ulcers  On sacrum, Rt and Lt buttock  consulted burn    #Foot ulcer  Podiatry consulted      #Nausea and Vomiting  GI consulted for esophagogram due to low finger sticks  GI suggested it to be metabolic in origin   Patient refused EGD/colonoscopy possibility    #Dysuria  improved after irrigation of SPC  patient reported that he has been changed SPC several days prior to admission at home and has been going up and down sizes whenever it leaked  UA/UCx sent, r/o CAUTI  irrigate 100cc sterile water q shift    #Persistent Hypoglycemia ( asymptomatic)    r/o adrenal insufficiency.    - acth stim test marginally abnormal,  repeated ACTH, start dexamethasone 0.5 mg po qd  already on florinef  -    # HTN  - Well- controlled  - Monitor off medication for now    # DM2  - FS running low; also poor oral intake  - HbA1C  5.5    # Chronic back pain with functional paraplegia  -Pain management on board  -slowly deescalating methadone and gabapentin doseages    # Anxiety  - deescalating benzos      DVT ppx: Heparin subQ  GI ppx: Not indicated  Diet: DASH  Activity: increase as tolerated  Code status: full code

## 2019-07-05 NOTE — PROGRESS NOTE ADULT - ASSESSMENT
58 years old male with reported PMH of HTN, chronic back pain, chronic anemia, L1 burst fracture with paraplegia since 1986 (bedbound and wheelchair bound), PAD s/p recent BKA on 6/4, neurogenic bladder with suprapubic catheter presented with weakness and generalized pain    # Acute on chronic normocytic anemia  - Hgb 6 on admission, baseline 8-9  - No signs of active bleeding, rectal exam shows brown stool  - repeat labs/ active T+S, may need therapeutic transfusion if hgb drops below 7  # h/h noted, no venofer elevated sat, start procrit 5000 units s/c weekly    # Oliguric SILVINA on CKD (now stage 4) w/ Hyperchloremic Metabolic Acidosis   (Cr 4.9, baseline 1.1 in   8/ 2018; 3.5 mg% on June 18,2019)  - due to ATN most likely   -FeNA>1% CPK - 20  - cont IVF current   - repeat BMP from today   - cont po NA bicarb 650 tid  - Stable  - Kidney US on 6/4 shows no hydronephrosis, bilateral echogenic kidney compatible with medical renal disease  - monitor K level   - appreciated renal f/u today- labelled as "neurology follow up"- see note- agree with recommendations  # if creatinine does not improve, will need tesio and initiation of RRT/ no acute indication   # ? benefit of kidney biopsy  # check c3,c4, JOSE D, DNA, K/L ratio, IF, hepatitis profile, ANCA   # check pr/cr ratio  # check ph level    #Vomiting  typical of a first bite dysphagia picture, vs may be secondary to uremia/ although mentation has been improving  would check barium esophagram r/o esophageal dysmotility. avoid extremes in temperature for feedings  po intake a bit better today, sugars also slightly better- but still requiring dextrose fluid      #Dysuria  improved after irrigation of SPC  irrigate 100cc sterile water q shift  empiric ceftriaxone as patient with active sediment and symptoms    Persistent Hypoglycemia ( asymptomatic) > Would r/o adrenal insufficiency.   c/w dextrose in IVF     cortisol level >> WNL  - acth stim test marginally abnormal, discussed with endocrinology attending, repeated ACTH, started dexamethasone 0.5 mg po qd  already on florinef      # HTN  - Well- controlled  - Monitor off medication for now    # DM2?  - FS running low; also poor oral intake  - HbA1C  5.5    # Chronic back pain with functional paraplegia  - suspect a component of toxic encephalopathy 2/2 polypharmacy and poor renal clearance  slowly deescalating methadone and gabapentin doseages    # Anxiety  - deescalating benzos      DVT ppx: Heparin subQ  GI ppx: Not indicated  Diet: DASH  Activity: increase as tolerated  Code status: full code      #Progress Note Handoff  Pending (specify): improvement in FS's/ mentation/ oral intake; f/u UA  Family discussion: D/W patient and the mother at bedside  Disposition: Possible SNF

## 2019-07-05 NOTE — CONSULT NOTE ADULT - ASSESSMENT
58 years old male with reported PMH of HTN, chronic back pain, anemia of chronic disease , L1 burst fracture with paraplegia since 1986 (bedbound and wheelchair bound), PAD s/p recent BKA on 6/4, neurogenic bladder with suprapubic catheter (recently changed 3 days ago) admitted to the hospital for SILVINA and c/o vomiting food for the last 2 days. He is hemodynamically stable, afebrile. Patient was found to have persistent hypoglycemia and also mild adrenal insufficiency. He never had and EGD or Colonoscopy.    # Vomiting: Likely metabolic  Patient and mom denies any previous episodes before admission, weight loss, blood in stools, fever, chills, no leucocytosis, abdominal pain.  Patient is refusing any EGD or Colonoscopy at this time.  XR esophagogram pending.  Persistent Hypoglycemia: Please follow up with endocrinology.  will follow up with his symptoms.

## 2019-07-05 NOTE — PROGRESS NOTE ADULT - SUBJECTIVE AND OBJECTIVE BOX
MARGE BELLA  58y  Male      Patient is a 58y old  Male who presents with a chief complaint of Acute kidney injury, anemia (05 Jul 2019 15:17)      INTERVAL HPI/OVERNIGHT EVENTS: more alert/ interactive. still with poor oral intake but no vomiting episodes today. chronic back pain controlled       REVIEW OF SYSTEMS:  as above  All other review of systems negative    T(C): 35.8 (07-05-19 @ 13:14), Max: 35.8 (07-04-19 @ 20:57)  HR: 82 (07-05-19 @ 13:14) (62 - 82)  BP: 160/84 (07-05-19 @ 13:14) (120/59 - 160/84)  RR: 18 (07-05-19 @ 13:14) (18 - 18)  SpO2: --  Wt(kg): --Vital Signs Last 24 Hrs  T(C): 35.8 (05 Jul 2019 13:14), Max: 35.8 (04 Jul 2019 20:57)  T(F): 96.5 (05 Jul 2019 13:14), Max: 96.5 (05 Jul 2019 13:14)  HR: 82 (05 Jul 2019 13:14) (62 - 82)  BP: 160/84 (05 Jul 2019 13:14) (120/59 - 160/84)  BP(mean): --  RR: 18 (05 Jul 2019 13:14) (18 - 18)  SpO2: --      07-04-19 @ 07:01  -  07-05-19 @ 07:00  --------------------------------------------------------  IN: 0 mL / OUT: 2800 mL / NET: -2800 mL    07-05-19 @ 07:01  -  07-05-19 @ 16:37  --------------------------------------------------------  IN: 0 mL / OUT: 800 mL / NET: -800 mL        PHYSICAL EXAM:  GENERAL: NAD more alert today  PSYCH: no agitation, baseline mentation  NERVOUS SYSTEM:  Alert & Oriented X3, no new focal deficits paraplegia as prior  PULMONARY: Clear to percussion bilaterally; No rales, rhonchi, wheezing, or rubs  CARDIOVASCULAR: Regular rate and rhythm; No murmurs, rubs, or gallops  GI: Soft, Nontender, Nondistended; Bowel sounds present   suprapubic catheters, sediment, surrounding fungal rash  EXTREMITIES:  2+ Peripheral Pulses, No clubbing, cyanosis, or edema; R bka stump intact, L lat maleolus ulcer and lateral plantar small ulcer    Consultant(s) Notes Reviewed:  [x ] YES  [ ] NO    Discussed with Consultants/Other Providers [ x] YES     LABS                          8.1    10.89 )-----------( 352      ( 05 Jul 2019 06:14 )             26.6     07-05    141  |  111<H>  |  39<H>  ----------------------------<  65<L>  3.8   |  18  |  4.5<HH>    Ca    7.5<L>      05 Jul 2019 06:14            Lactate Trend        CAPILLARY BLOOD GLUCOSE      POCT Blood Glucose.: 141 mg/dL (05 Jul 2019 11:49)        RADIOLOGY & ADDITIONAL TESTS:    Imaging Personally Reviewed:  [ ] YES  [ ] NO    HEALTH ISSUES - PROBLEM Dx:  Lumbago without sciatica: Lumbago without sciatica

## 2019-07-05 NOTE — CONSULT NOTE ADULT - SUBJECTIVE AND OBJECTIVE BOX
Please contact me with any questions on my pager [phone].  Hospital Day # 9d    HPI:   58 years old male with reported PMH of HTN, chronic back pain, anemia of chronic disease , L1 burst fracture with paraplegia since 1986 (bedbound and wheelchair bound), PAD s/p recent BKA on 6/4, neurogenic bladder with suprapubic catheter (recently changed 3 days ago) came to the hospital after having a fall when transferring the patient from bed. He was found to have SILVINA on CKD. We were called because patient has been vomiting for the last 2 days. Two days before the nurse reported that patient was having food and he vomited the undigested food after around 15-20 minutes, no blood, non bilious neve had previous episodes. Then the next day patient was having  a sandwich and according to him he had big bites and he threw up after 10-15 minutes, NBNB. Now patient denies nausea, vomiting and he ate 2 cups of cereals in am. He is denying abdominal pain, and is regularly going to the bathroom. His FS are low in 60s and is being worked up for adrenal insufficiency.          PAST MEDICAL & SURGICAL HISTORY  Anemia  PAD (peripheral artery disease)  Hypertension  Suprapubic catheter  Pressure ulcer  Diabetes  Lumbar compression fracture  S/P below knee amputation, right  S/P debridement  Toe amputation status, right  H/O hernia repair      FAMILY HISTORY:  FAMILY HISTORY:  No pertinent family history in first degree relatives      SOCIAL HISTORY:  smoker: Ex smoker  Alcohol: Denies  Drug: Denies    ALLERGIES:  sulfamethizole (Unknown)      MEDICATIONS:  MEDICATIONS  (STANDING):  chlorhexidine 4% Liquid 1 Application(s) Topical <User Schedule>  dexamethasone     Tablet 0.5 milliGRAM(s) Oral daily  dextrose 40% Gel 15 Gram(s) Oral once  dextrose 5% + sodium chloride 0.45%. 1000 milliLiter(s) (125 mL/Hr) IV Continuous <Continuous>  dronabinol 2.5 milliGRAM(s) Oral two times a day  epoetin kecia Injectable 5000 Unit(s) SubCutaneous every 7 days  fludroCORTISONE 0.1 milliGRAM(s) Oral daily  gabapentin 200 milliGRAM(s) Oral daily  heparin  Injectable 5000 Unit(s) SubCutaneous every 8 hours  methadone    Tablet 40 milliGRAM(s) Oral three times a day  nystatin Cream 1 Application(s) Topical two times a day  sodium bicarbonate 650 milliGRAM(s) Oral three times a day  testosterone 1% Gel 50 milliGRAM(s) Topical daily    MEDICATIONS  (PRN):  acetaminophen   Tablet .. 650 milliGRAM(s) Oral once PRN Moderate Pain (4 - 6)  ALPRAZolam 0.5 milliGRAM(s) Oral at bedtime PRN insomnia      HOME MEDICATIONS:  Home Medications:  gabapentin 800 mg oral tablet: 1 tab(s) orally 3 times a day (26 Jun 2019 21:52)  methadone 10 mg oral tablet: 8 tab(s) orally every 6 hours (26 Jun 2019 21:52)  oxyCODONE 30 mg oral tablet: 1 tab(s) orally 4 times a day (26 Jun 2019 21:52)  sodium hypochlorite 0.25% topical solution: 1 application topically 2 times a day (26 Jun 2019 21:52)  Testim 1% (50 mg/5 g) transdermal gel: 1 packet(s) transdermal once a day (in the morning) (26 Jun 2019 21:52)  Valium 10 mg oral tablet: orally 2 times a day (26 Jun 2019 21:52)  Xanax 2 mg oral tablet: orally once (at bedtime) (26 Jun 2019 21:52)      ROS:     REVIEW OF SYSTEMS:    CONSTITUTIONAL: No weakness, fevers or chills  EYES/ENT: No visual changes.   NECK: No pain or stiffness  RESPIRATORY: No cough, wheezing, hemoptysis; No shortness of breath  CARDIOVASCULAR: No chest pain or palpitations  GASTROINTESTINAL: Vomiting  GENITOURINARY: No dysuria, frequency or hematuria.  NEUROLOGICAL: Paraplegic  SKIN: No itching, rashes      VITALS:   T(F): 96 (07-05 @ 04:32), Max: 97 (07-03 @ 05:37)  HR: 62 (07-05 @ 04:32) (62 - 82)  BP: 120/59 (07-05 @ 04:32) (120/59 - 155/76)  BP(mean): --  RR: 18 (07-05 @ 04:32) (17 - 18)  SpO2: 100% (07-03 @ 09:10) (100% - 100%)    I&O's Summary    04 Jul 2019 07:01  -  05 Jul 2019 07:00  --------------------------------------------------------  IN: 0 mL / OUT: 2800 mL / NET: -2800 mL        Physical Exam:  GENERAL: NAD, well-developed  HEAD:  Atraumatic, Normocephalic  EYES: EOMI, PERRLA, conjunctiva and sclera clear  NECK: No thyromegaly  CHEST/LUNG: No accessory use of muscle  HEART: S1S2 Normal  ABDOMEN: Soft, Nontender, Nondistended; Bowel sounds present, no guarding or rigidity.  EXTREMITIES: No cyanosis, s/p RBKA  PSYCH: AAOx3  NEUROLOGY: Moves upper extremities, paraplegic      LABS:                        8.1    10.89 )-----------( 352      ( 05 Jul 2019 06:14 )             26.6             07-05    141  |  111<H>  |  39<H>  ----------------------------<  65<L>  3.8   |  18  |  4.5<HH>    Ca    7.5<L>      05 Jul 2019 06:14              Previous EGD: None     Previous colonoscopy: None      RADIOLOGY:    CT Abdomen and Pelvis: None

## 2019-07-06 NOTE — SWALLOW BEDSIDE ASSESSMENT ADULT - ASR SWALLOW ASPIRATION MONITOR
pneumonia/throat clearing/upper respiratory infection/gurgly voice/oral hygiene/position upright (90Y)/cough/fever/change of breathing pattern
change of breathing pattern/position upright (90Y)/cough/fever/throat clearing/oral hygiene/pneumonia/upper respiratory infection/gurgly voice
oral hygiene/cough/gurgly voice/fever/pneumonia/throat clearing/upper respiratory infection/change of breathing pattern/position upright (90Y)

## 2019-07-06 NOTE — SWALLOW BEDSIDE ASSESSMENT ADULT - SWALLOW EVAL: RECOMMENDED FEEDING/EATING TECHNIQUES
oral hygiene/small sips/bites/position upright (90 degrees)/slow rate, no talking w/ food in mouth
alternate food with liquid/small sips/bites/maintain upright posture during/after eating for 30 mins/slowed pace
slow rate, no talking w/ food in mouth/position upright (90 degrees)/small sips/bites/oral hygiene
slow rate, no talking w/ food in mouth/oral hygiene/position upright (90 degrees)/small sips/bites

## 2019-07-06 NOTE — CONSULT NOTE ADULT - SUBJECTIVE AND OBJECTIVE BOX
PODIATRY CONSULT   MARGE BELLA is a 58y old  Male who presents with a chief complaint of Acute kidney injury, anemia (06 Jul 2019 07:36)    HPI:  58 years old male with reported PMH of HTN, chronic back pain, chronic anemia, L1 burst fracture with paraplegia since 1986 (bedbound and wheelchair bound), PAD s/p recent BKA on 6/4, neurogenic bladder with suprapubic catheter (recently changed 3 days ago) presented with weakness and generalized pain. History was also obtained from patient's mother at bedside. Patient was found to be on the floor this morning by his mother. Patient states that he was trying to transfer himself from bed to his wheelchair but he fell on the floor. Patient denies head trauma or LOC. His mother later found him and called the ambulance. Patient was recently admitted to [facility] for RLE gangrene and had BKA done on 6/4. Patient was discharged on 6/21 to SNF initially but patient refused, so he was discharged home instead. Patient has fallen at home 3 times since his discharge. Patient reports weakness, bilateral shoulder pain, and chronic low back pain. Patient denies fever/chills, cough, chest pain, shortness of breath, abdominal pain, nausea/vomiting, diarrhea/constipation. Patient was found to be anemic with hemoglobin of 6 (baseline 8-9) and elevated creatinine of 4.8 (baseline 0.8 in 2018, 3.8 in 2019).    In ED: 1 unit PRBC, 1L NS (26 Jun 2019 20:12)      ANEMIA  Anemia  PAD (peripheral artery disease)  Hypertension  Suprapubic catheter  Pressure ulcer  Diabetes  Lumbar compression fracture      PMH: ANEMIA  Anemia  PAD (peripheral artery disease)  Hypertension  Suprapubic catheter  Pressure ulcer  Diabetes  Lumbar compression fracture    PSH: S/P below knee amputation, right  S/P debridement  Toe amputation status, right  H/O hernia repair  No significant past surgical history    Medication cefTRIAXone   IVPB 1000 milliGRAM(s) IV Intermittent every 24 hours    Allergy: sulfamethizole (Unknown)        Labs:                        8.6    9.80  )-----------( 353      ( 06 Jul 2019 07:13 )             27.7       07-06    144  |  112<H>  |  37<H>  ----------------------------<  57<L>  3.8   |  20  |  4.4<HH>    Ca    7.4<L>      06 Jul 2019 07:13  Phos  6.2     07-06            Culture - Urine (collected 04 Jul 2019 13:10)  Source: .Urine Catheterized  Final Report (05 Jul 2019 22:02):    <10,000 CFU/mL Normal Urogenital Lyubov        ROS:  [ ]A ten point review of system was otherwise negative except as noted    Physical Exam - Lower Extremity Focused:   Derm:   Pressure Ulceration Left lateral malleolus   - Wound Edges + Irregular + Clean,  - Wound base Fibrotic/Necrotic, wound size (1.9 cm X 1.4 cm X 0.1cm), - fluctuance, - probe to bone, - Tunneling,  - Lyric-wound skin - Erythema, - Streaking erythema, - Warmth  - Edema  - no Drainage  Vascular:   DP and PT pulses non-palpable . L foot cool to touch.  Neuro:  - Protective sensation diminished.          Assessment:   Stage 2 Pressure ulcer left lateral malleolus, stable, no signs of infection   Plan:  Chart reviewed and Patient evaluated  Discussed diagnosis and treatment with patient  Applied allevyn   Wound Care Orders: Clean with water and soap and apply Allevyn Daily   Offloading to L Heel with pillows   Will discuss care plan  with  Attending PODIATRY CONSULT   MARGE BELLA is a 58y old  Male who presents with a chief complaint of Acute kidney injury, anemia (06 Jul 2019 07:36)    HPI:  58 years old male with reported PMH of HTN, chronic back pain, chronic anemia, L1 burst fracture with paraplegia since 1986 (bedbound and wheelchair bound), PAD s/p recent BKA on 6/4, neurogenic bladder with suprapubic catheter (recently changed 3 days ago) presented with weakness and generalized pain. History was also obtained from patient's mother at bedside. Patient was found to be on the floor this morning by his mother. Patient states that he was trying to transfer himself from bed to his wheelchair but he fell on the floor. Patient denies head trauma or LOC. His mother later found him and called the ambulance. Patient was recently admitted to [facility] for RLE gangrene and had BKA done on 6/4. Patient was discharged on 6/21 to SNF initially but patient refused, so he was discharged home instead. Patient has fallen at home 3 times since his discharge. Patient reports weakness, bilateral shoulder pain, and chronic low back pain. Patient denies fever/chills, cough, chest pain, shortness of breath, abdominal pain, nausea/vomiting, diarrhea/constipation. Patient was found to be anemic with hemoglobin of 6 (baseline 8-9) and elevated creatinine of 4.8 (baseline 0.8 in 2018, 3.8 in 2019).    In ED: 1 unit PRBC, 1L NS (26 Jun 2019 20:12)      ANEMIA  Anemia  PAD (peripheral artery disease)  Hypertension  Suprapubic catheter  Pressure ulcer  Diabetes  Lumbar compression fracture      PMH: ANEMIA  Anemia  PAD (peripheral artery disease)  Hypertension  Suprapubic catheter  Pressure ulcer  Diabetes  Lumbar compression fracture    PSH: S/P below knee amputation, right  S/P debridement  Toe amputation status, right  H/O hernia repair  No significant past surgical history    Medication cefTRIAXone   IVPB 1000 milliGRAM(s) IV Intermittent every 24 hours    Allergy: sulfamethizole (Unknown)        Labs:                        8.6    9.80  )-----------( 353      ( 06 Jul 2019 07:13 )             27.7       07-06    144  |  112<H>  |  37<H>  ----------------------------<  57<L>  3.8   |  20  |  4.4<HH>    Ca    7.4<L>      06 Jul 2019 07:13  Phos  6.2     07-06            Culture - Urine (collected 04 Jul 2019 13:10)  Source: .Urine Catheterized  Final Report (05 Jul 2019 22:02):    <10,000 CFU/mL Normal Urogenital Lyubov        ROS:  [ ]A ten point review of system was otherwise negative except as noted    Physical Exam - Lower Extremity Focused:   Derm:   Pressure Ulceration Left lateral malleolus   - Wound Edges + Irregular + Clean,  - Wound base Fibrotic/Necrotic, wound size (1.9 cm X 1.4 cm X 0.1cm), - fluctuance, - probe to bone, - Tunneling,  - Lyric-wound skin - Erythema, - Streaking erythema, - Warmth  - Edema  - no Drainage  Vascular:   DP and PT pulses non-palpable . L foot cool to touch.  Neuro:  - Protective sensation diminished.          Assessment:   Stage 2 Pressure ulcer left lateral malleolus, stable, no signs of infection   Plan:  Chart reviewed and Patient evaluated with attending   Discussed diagnosis and treatment with patient  Applied allevyn   Wound Care Orders: Clean with water and soap and apply Allevyn Daily   Offloading to L Heel with pillows

## 2019-07-06 NOTE — SWALLOW BEDSIDE ASSESSMENT ADULT - DIET PRIOR TO ADMI
regular and thin
regular consistency solids w/ thin liquids

## 2019-07-06 NOTE — PROGRESS NOTE ADULT - SUBJECTIVE AND OBJECTIVE BOX
58 years old male with reported PMH of HTN, chronic back pain, chronic anemia, L1 burst fracture with paraplegia since 1986 (bedbound and wheelchair bound), PAD s/p recent BKA on 6/4, neurogenic bladder with suprapubic catheter (recently changed 3 days ago) presented with weakness and generalized pain. History was also obtained from patient's mother at bedside. Patient was found to be on the floor this morning by his mother. Patient states that he was trying to transfer himself from bed to his wheelchair but he fell on the floor. Patient denies head trauma or LOC. His mother later found him and called the ambulance. Patient was recently admitted to [facility] for RLE gangrene and had BKA done on 6/4. Patient was discharged on 6/21 to SNF initially but patient refused, so he was discharged home instead. Patient has fallen at home 3 times since his discharge. Patient reports weakness, bilateral shoulder pain, and chronic low back pain. Patient denies fever/chills, cough, chest pain, shortness of breath, abdominal pain, nausea/vomiting, diarrhea/constipation. Patient was found to be anemic with hemoglobin of 6 (baseline 8-9) and elevated creatinine of 4.8 (baseline 0.8 in 2018, 3.8 in 2019).    PAST MEDICAL & SURGICAL HISTORY:  Anemia  PAD (peripheral artery disease)  Hypertension  Suprapubic catheter  Pressure ulcer  Diabetes  Lumbar compression fracture  S/P below knee amputation, right  S/P debridement  Toe amputation status, right  H/O hernia repair      OVERNIGHT EVENTS:    SUBJECTIVE / INTERVAL HPI: Patient seen and examined at bedside.     VITAL SIGNS:  Vital Signs Last 24 Hrs  T(C): 35.8 (06 Jul 2019 05:23), Max: 35.8 (05 Jul 2019 13:14)  T(F): 96.4 (06 Jul 2019 05:23), Max: 96.5 (05 Jul 2019 13:14)  HR: 87 (06 Jul 2019 05:23) (82 - 87)  BP: 123/59 (06 Jul 2019 05:23) (123/59 - 160/84)  BP(mean): --  RR: 18 (06 Jul 2019 05:23) (18 - 18)  SpO2: --    PHYSICAL EXAM:    General: WDWN  HEENT: NC/AT; PERRL, anicteric sclera; MMM  Neck: supple  Cardiovascular: +S1/S2, RRR  Respiratory: CTA B/L; no W/R/R  Gastrointestinal: soft, NT/ND; +BSx4  Extremities: WWP; no edema, clubbing or cyanosis  Vascular: 2+ radial, DP/PT pulses B/L  Neurological: AAOx3; no focal deficits    MEDICATIONS:  MEDICATIONS  (STANDING):  cefTRIAXone   IVPB 1000 milliGRAM(s) IV Intermittent every 24 hours  chlorhexidine 4% Liquid 1 Application(s) Topical <User Schedule>  Dakins Solution - 1/2 Strength 1 Application(s) Topical two times a day  dexamethasone     Tablet 0.5 milliGRAM(s) Oral daily  dextrose 40% Gel 15 Gram(s) Oral once  dextrose 5% + sodium chloride 0.45%. 1000 milliLiter(s) (75 mL/Hr) IV Continuous <Continuous>  dronabinol 2.5 milliGRAM(s) Oral two times a day  epoetin kecia Injectable 5000 Unit(s) SubCutaneous every 7 days  fludroCORTISONE 0.1 milliGRAM(s) Oral daily  gabapentin 200 milliGRAM(s) Oral daily  heparin  Injectable 5000 Unit(s) SubCutaneous every 8 hours  methadone    Tablet 40 milliGRAM(s) Oral three times a day  nystatin Cream 1 Application(s) Topical two times a day  sodium bicarbonate 650 milliGRAM(s) Oral three times a day  testosterone 1% Gel 50 milliGRAM(s) Topical daily    MEDICATIONS  (PRN):  acetaminophen   Tablet .. 650 milliGRAM(s) Oral once PRN Moderate Pain (4 - 6)  ALPRAZolam 0.5 milliGRAM(s) Oral at bedtime PRN insomnia      ALLERGIES:  Allergies    sulfamethizole (Unknown)    Intolerances        LABS:                        8.6    9.80  )-----------( 353      ( 06 Jul 2019 07:13 )             27.7     07-06    144  |  112<H>  |  37<H>  ----------------------------<  57<L>  3.8   |  20  |  4.4<HH>    Ca    7.4<L>      06 Jul 2019 07:13  Phos  6.2     07-06          CAPILLARY BLOOD GLUCOSE      POCT Blood Glucose.: 72 mg/dL (06 Jul 2019 07:45)      RADIOLOGY & ADDITIONAL TESTS: Reviewed.

## 2019-07-06 NOTE — PROGRESS NOTE ADULT - ASSESSMENT
58 years old male with reported PMH of HTN, chronic back pain, chronic anemia, L1 burst fracture with paraplegia since 1986 (bedbound and wheelchair bound), PAD s/p recent BKA on 6/4, neurogenic bladder with suprapubic catheter presented with weakness and generalized pain.  Day 10 in hospital. Patient seen and examined. Patient is looking better and responsive. The rash around the SPT looks improved. Urine cultures have been negative. Going to be repeated again.     #Pain Med de-escalation  No change made as patient is doing well on this regimen   Consulted nephrology c3,c4, JOSE D, DNA, K/L ratio, hepatitis profile, ANCA, pr/cr ratio sent    # Acute on chronic normocytic anemia  - Hgb 6 on admission, baseline 8-9  - No signs of active bleeding, rectal exam shows brown stool  - repeat labs/ active T+S, may need therapeutic transfusion if hgb drops below 7  -Procrit 5000 units subcut/week started    # Oliguric SILVINA on CKD (now stage 4) deveopled in past 6 months   w/ Hyperchloremic Metabolic Acidosis   (Cr 4.9, baseline 1.1 in   8/ 2018; 3.5 mg% on June 18,2019)  nephrology rec:  no indication for RRT at this point. -FeNA>1% CPK - 20  - continuing po NA bicarb 650 tid  - Stable Cr  - Kidney US on 6/4 shows no hydronephrosis, bilateral echogenic kidney compatible with medical renal disease  - monitor K level     #Pressure ulcers  On sacrum, Rt and Lt buttock  followed by burn    #Foot ulcer  Podiatry consulted  Wound assessed and will be followed    #Nausea and Vomiting  GI consulted for esophagogram due to low finger sticks  GI suggested it to be metabolic in origin   Patient refused EGD/colonoscopy possibility    #Dysuria  improved after irrigation of SPC  patient reported that he has been changed SPC several days prior to admission at home and has been going up and down sizes whenever it leaked  UA/UCx sent, r/o CAUTI  irrigate 100cc sterile water q shift    #Persistent Hypoglycemia ( asymptomatic)    r/o adrenal insufficiency.    - acth stim test marginally abnormal,  repeated ACTH, start dexamethasone 0.5 mg po qd  already on florinef  improving finger sticks    # HTN  - Well- controlled  - Monitor off medication for now    # DM2  - FS running low; also poor oral intake  - HbA1C  5.5    # Chronic back pain with functional paraplegia  -Pain management on board  -slowly deescalating methadone and gabapentin doseages    # Anxiety  - deescalating benzos      DVT ppx: Heparin subQ  GI ppx: Not indicated  Diet: Renal restricted  Activity: increase as tolerated  Code status: full code

## 2019-07-06 NOTE — PROGRESS NOTE ADULT - ASSESSMENT
58 years old male with reported PMH of HTN, chronic back pain, chronic anemia, L1 burst fracture with paraplegia since 1986 (bedbound and wheelchair bound), PAD s/p recent BKA on 6/4, neurogenic bladder with suprapubic catheter presented with weakness and generalized pain    # Acute on chronic normocytic anemia  - Hgb 6 on admission, baseline 8-9  - No signs of active bleeding, rectal exam shows brown stool  - repeat labs/ active T+S, may need therapeutic transfusion if hgb drops below 7  # h/h noted, no venofer elevated sat, start procrit 5000 units s/c weekly    # Oliguric SILVINA on CKD (now stage 4) w/ Hyperchloremic Metabolic Acidosis   (Cr 4.9, baseline 1.1 in   8/ 2018; 3.5 mg% on June 18,2019)  - due to ATN most likely   -FeNA>1% CPK - 20  - cont IVF current   - repeat BMP from today   - cont po NA bicarb 650 tid  - Stable  - Kidney US on 6/4 shows no hydronephrosis, bilateral echogenic kidney compatible with medical renal disease  - monitor K level   - appreciated renal f/u today  decrease IVF to 75/hr  # if creatinine does not improve or vomiting persists, will need tesio and initiation of RRT/ no acute indication at the moment  # ? benefit of kidney biopsy  # check c3,c4, JOSE D, DNA, K/L ratio, IF, hepatitis profile, ANCA   # check pr/cr ratio  # check ph level    #Vomiting  typical of a first bite dysphagia picture, vs may be secondary to uremia/ although mentation has been improving  would check barium esophagram r/o esophageal dysmotility. avoid extremes in temperature for feedings  initiate calorie count, patient says no vomiting x 2 days, but po intake has been minimal per reports      #Dysuria  improved after irrigation of SPC  irrigate 100cc sterile water q shift  empiric ceftriaxone as patient with active sediment and symptoms    Persistent Hypoglycemia ( asymptomatic) > Would r/o adrenal insufficiency.   c/w dextrose in IVF     cortisol level >> WNL  - acth stim test marginally abnormal, discussed with endocrinology attending, repeated ACTH, started dexamethasone 0.5 mg po qd  already on florinef      # HTN  - Well- controlled  - Monitor off medication for now    # DM2?  - FS running low; also poor oral intake  - HbA1C  5.5    # Chronic back pain with functional paraplegia  - suspect a component of toxic encephalopathy 2/2 polypharmacy and poor renal clearance  slowly deescalating methadone and gabapentin doseages    # Anxiety  - deescalating benzos      DVT ppx: Heparin subQ  GI ppx: Not indicated  Diet: DASH  Activity: increase as tolerated  Code status: full code      #Progress Note Handoff  Pending (specify): improvement in FS's/ mentation/ oral intake; f/u UA; f/u calorie count  Family discussion: D/W patient and the mother at bedside  Disposition: Possible SNF

## 2019-07-06 NOTE — PROGRESS NOTE ADULT - SUBJECTIVE AND OBJECTIVE BOX
MARGE BELLA  58y  Male      Patient is a 58y old  Male who presents with a chief complaint of Acute kidney injury, anemia (06 Jul 2019 10:13)      INTERVAL HPI/OVERNIGHT EVENTS: denies n/v today. eating a bit. denies hypoglycemic symptoms      REVIEW OF SYSTEMS:  as above  All other review of systems negative    T(C): 35.8 (07-06-19 @ 05:23), Max: 35.8 (07-05-19 @ 13:14)  HR: 87 (07-06-19 @ 05:23) (82 - 87)  BP: 123/59 (07-06-19 @ 05:23) (123/59 - 160/84)  RR: 18 (07-06-19 @ 05:23) (18 - 18)  SpO2: --  Wt(kg): --Vital Signs Last 24 Hrs  T(C): 35.8 (06 Jul 2019 05:23), Max: 35.8 (05 Jul 2019 13:14)  T(F): 96.4 (06 Jul 2019 05:23), Max: 96.5 (05 Jul 2019 13:14)  HR: 87 (06 Jul 2019 05:23) (82 - 87)  BP: 123/59 (06 Jul 2019 05:23) (123/59 - 160/84)  BP(mean): --  RR: 18 (06 Jul 2019 05:23) (18 - 18)  SpO2: --      07-05-19 @ 07:01  -  07-06-19 @ 07:00  --------------------------------------------------------  IN: 0 mL / OUT: 1050 mL / NET: -1050 mL        PHYSICAL EXAM:  GENERAL: NAD  PSYCH: no agitation, baseline mentation  NERVOUS SYSTEM:  Alert & Oriented X3, no new focal deficits  PULMONARY: Clear to percussion bilaterally; No rales, rhonchi, wheezing, or rubs  CARDIOVASCULAR: Regular rate and rhythm; No murmurs, rubs, or gallops  GI: Soft, Nontender, Nondistended; Bowel sounds present   spc less sediment, improving rash  EXTREMITIES:  BKA, lat malleolus ulcer    Consultant(s) Notes Reviewed:  [x ] YES  [ ] NO    Discussed with Consultants/Other Providers [ x] YES     LABS                          8.6    9.80  )-----------( 353      ( 06 Jul 2019 07:13 )             27.7     07-06    144  |  112<H>  |  37<H>  ----------------------------<  57<L>  3.8   |  20  |  4.4<HH>    Ca    7.4<L>      06 Jul 2019 07:13  Phos  6.2     07-06            Lactate Trend        CAPILLARY BLOOD GLUCOSE      POCT Blood Glucose.: 72 mg/dL (06 Jul 2019 07:45)        RADIOLOGY & ADDITIONAL TESTS:    Imaging Personally Reviewed:  [ ] YES  [ ] NO    HEALTH ISSUES - PROBLEM Dx:  Lumbago without sciatica: Lumbago without sciatica

## 2019-07-06 NOTE — SWALLOW BEDSIDE ASSESSMENT ADULT - NS SPL SWALLOW CLINIC TRIAL FT
+toleration w/o overt s/s penetration/aspiration w/ thin liquids. If pt completing liquid wash to clear oral residue, pt w/ intermittent coughing.
+toleration w/o overt s/s penetration/aspiration w/ thin liquids.
+toleration w/o overt s/s penetration/aspiration w/ thin liquids.
tolerated

## 2019-07-06 NOTE — SWALLOW BEDSIDE ASSESSMENT ADULT - SLP PERTINENT HISTORY OF CURRENT PROBLEM
58y o m recently found on floor by his mother after trying to transfer himself from bed to . PMHx: L1 burst fx w/ paraplegia since 86. Recent BKA 2'2 gangrene R LE in 6/19.
PMHx of HTN, chronic back pain, chronic anemia, L1 burst fracture with paraplegia since 1986 (bedbound and wheelchair bound), PAD s/p recent BKA on 6/4, neurogenic bladder with suprapubic catheter presented with weakness and generalized pain. Pt w/ some vomiting episodes after eating, could be indicative of esophageal dysphagia, esophagram ordered, GI following.
PMHx of HTN, chronic back pain, chronic anemia, L1 burst fracture with paraplegia since 1986 (bedbound and wheelchair bound), PAD s/p recent BKA on 6/4, neurogenic bladder with suprapubic catheter presented with weakness and generalized pain.
PMHx of HTN, chronic back pain, chronic anemia, L1 burst fracture with paraplegia since 1986 (bedbound and wheelchair bound), PAD s/p recent BKA on 6/4, neurogenic bladder with suprapubic catheter presented with weakness and generalized pain.

## 2019-07-06 NOTE — SWALLOW BEDSIDE ASSESSMENT ADULT - SWALLOW EVAL: RECOMMENDED DIET
regular consistency solids w/ thin liquids
regular and thin
regular consistency solids w/ thin liquids
regular consistency solids w/ thin liquids

## 2019-07-06 NOTE — SWALLOW BEDSIDE ASSESSMENT ADULT - SWALLOW EVAL: DIAGNOSIS
+toleration for thin liquids and solids w/o overt s/s penetration/aspiration.
Pt p/w functional oropharyngeal swallow for regular textures and thin liquids. Rec continuation of regular textures andhin liquids. Reconsult ST as needed.
+toleration for thin liquids and regular consistency solids w/ occasional coughing post trials when pt w/ residue in oral cavity and completing a liquid wash, likely r/t fluctuating mental status.
+toleration for thin liquids w/o overt s/s penetration/aspiration. Pt refused solid trials d/t pain in stomach.

## 2019-07-06 NOTE — SWALLOW BEDSIDE ASSESSMENT ADULT - SLP GENERAL OBSERVATIONS
Pt receiving sitting upright in bed w/ c/o pain. +room air. Pt alert and oriented x3, however w/ fluctuating mental status. pt's mother at the bedside.
Pt awake in bed , breathing RA
Pt receiving sitting upright in bed w/ c/o pain. +room air. Pt alert and oriented x3, however w/ fluctuating mental status.
Pt receiving sitting upright in bed w/ no c/o pain. +room air. Pt alert and oriented x3, however w/ fluctuating mental status and decreased safety awareness during PO intake.

## 2019-07-06 NOTE — PROGRESS NOTE ADULT - ASSESSMENT
58 M w/ PMH of Paraplegia 2/2 Lumbar compression Fx, sacral decubiti and heel ulcers s/p debridements,, PAD, suprapubic cath, Neuropathy, DM2 & HTN , Rt gsngrene, sp BKA admitted with s/p fall found to be in SILVINA and have hyperKalemia.  #creatinine trending down  repeat today  # non oliguric   # patient with significant decrease in GFR in view of BKA  # if vomiting does not improve, will need tesio on monday  and initiation of RRT/ no acute indication   # GI notes appreciated   # ? benefit of kidney biopsy  # check c3,c4, JOSE D, DNA, K/L ratio, IF, hepatitis profile, ANCA   # check pr/cr ratio  # continue sodium bicarbonate po  # check ph level  # decrease iv fluid to 75 cc/h   # h/h noted, no venofer elevated sat, on  procrit 5000 units s/c weekly  # will follow

## 2019-07-06 NOTE — PROGRESS NOTE ADULT - SUBJECTIVE AND OBJECTIVE BOX
seen and examined  no distress  lying comfortable       Standing Inpatient Medications  cefTRIAXone   IVPB 1000 milliGRAM(s) IV Intermittent every 24 hours  chlorhexidine 4% Liquid 1 Application(s) Topical <User Schedule>  Dakins Solution - 1/2 Strength 1 Application(s) Topical two times a day  dexamethasone     Tablet 0.5 milliGRAM(s) Oral daily  dextrose 40% Gel 15 Gram(s) Oral once  dextrose 5% + sodium chloride 0.45%. 1000 milliLiter(s) IV Continuous <Continuous>  dronabinol 2.5 milliGRAM(s) Oral two times a day  epoetin kecia Injectable 5000 Unit(s) SubCutaneous every 7 days  fludroCORTISONE 0.1 milliGRAM(s) Oral daily  gabapentin 200 milliGRAM(s) Oral daily  heparin  Injectable 5000 Unit(s) SubCutaneous every 8 hours  methadone    Tablet 40 milliGRAM(s) Oral three times a day  nystatin Cream 1 Application(s) Topical two times a day  sodium bicarbonate 650 milliGRAM(s) Oral three times a day  testosterone 1% Gel 50 milliGRAM(s) Topical daily          VITALS/PHYSICAL EXAM  --------------------------------------------------------------------------------  T(C): 35.8 (07-06-19 @ 05:23), Max: 35.8 (07-05-19 @ 13:14)  HR: 87 (07-06-19 @ 05:23) (82 - 87)  BP: 123/59 (07-06-19 @ 05:23) (123/59 - 160/84)  RR: 18 (07-06-19 @ 05:23) (18 - 18)  SpO2: --  Wt(kg): --        07-05-19 @ 07:01  -  07-06-19 @ 07:00  --------------------------------------------------------  IN: 0 mL / OUT: 900 mL / NET: -900 mL      Physical Exam:  	Gen: NAD  	Pulm: CTA B/L  	CV:  S1S2; no rub  	Abd: +distended  	:  SPC  	LE: bka     LABS/STUDIES  --------------------------------------------------------------------------------              8.1    10.89 >-----------<  352      [07-05-19 @ 06:14]              26.6     141  |  111  |  39  ----------------------------<  65      [07-05-19 @ 06:14]  3.8   |  18  |  4.5        Ca     7.5     [07-05-19 @ 06:14]        Creatinine Trend:  SCr 4.5 [07-05 @ 06:14]  SCr 5.0 [07-04 @ 06:36]  SCr 5.0 [07-02 @ 16:05]  SCr 4.9 [07-01 @ 21:43]  SCr 5.1 [06-30 @ 17:14]    Urinalysis - [06-27-19 @ 10:20]      Color Yellow / Appearance Cloudy / SG 1.020 / pH 6.5      Gluc Negative / Ketone Negative  / Bili Negative / Urobili 0.2       Blood Moderate / Protein >=300 / Leuk Est Small / Nitrite Negative      RBC 11-25 / WBC 6-10 / Hyaline  / Gran  / Sq Epi  / Non Sq Epi Moderate / Bacteria Moderate    Urine Sodium 60.0      [07-01-19 @ 23:00]  Urine Potassium 19      [07-01-19 @ 23:00]  Urine Chloride 51      [07-01-19 @ 23:00]    Iron 37, TIBC 84, %sat 44      [07-05-19 @ 11:00]  PTH -- (Ca 7.8)      [07-02-19 @ 10:54]   71  HbA1c 5.5      [06-27-19 @ 06:35]

## 2019-07-07 NOTE — PROGRESS NOTE ADULT - SUBJECTIVE AND OBJECTIVE BOX
MARGE BELLA  58y  Male      Patient is a 58y old  Male who presents with a chief complaint of Acute kidney injury, anemia (06 Jul 2019 12:02)      INTERVAL HPI/OVERNIGHT EVENTS:      ******************************* REVIEW OF SYSTEMS:**********************************************  Remains comfortable.   All other review of systems negative    *********************** VITALS ******************************************    T(F): 97.1 (07-07-19 @ 05:40)  HR: 80 (07-07-19 @ 05:40) (80 - 85)  BP: 167/81 (07-07-19 @ 05:40) (156/78 - 170/72)  RR: 18 (07-07-19 @ 05:40) (18 - 18)  SpO2: 100% (07-06-19 @ 20:00) (100% - 100%)    07-06-19 @ 07:01  -  07-07-19 @ 07:00  --------------------------------------------------------  IN: 0 mL / OUT: 4300 mL / NET: -4300 mL            07-06-19 @ 07:01  -  07-07-19 @ 07:00  --------------------------------------------------------  IN: 0 mL / OUT: 4300 mL / NET: -4300 mL        ******************************** PHYSICAL EXAM:**************************************************  GENERAL: NAD    PSYCH: no agitation, baseline mentation  HEENT:     NERVOUS SYSTEM:  Alert & Oriented X3,  PULMONARY: DESIRAE, CTA    CARDIOVASCULAR: S1S2 RRR    GI: Soft, NT, ND; BS present.    EXTREMITIES:  right BKA with LLE edema , improved.     LYMPH: No lymphadenopathy noted    SKIN: No rashes or lesions    ******************************************************************************************      **************************** LABS *******************************************************                          8.6    9.80  )-----------( 353      ( 06 Jul 2019 07:13 )             27.7     07-06    144  |  112<H>  |  37<H>  ----------------------------<  57<L>  3.8   |  20  |  4.4<HH>    Ca    7.4<L>      06 Jul 2019 07:13  Phos  6.2     07-06            Lactate Trend        CAPILLARY BLOOD GLUCOSE      POCT Blood Glucose.: 65 mg/dL (07 Jul 2019 07:31)          **************************Active Medications *******************************************  sulfamethizole (Unknown)      acetaminophen   Tablet .. 650 milliGRAM(s) Oral once PRN  ALPRAZolam 0.5 milliGRAM(s) Oral at bedtime PRN  cefTRIAXone   IVPB 1000 milliGRAM(s) IV Intermittent every 24 hours  chlorhexidine 4% Liquid 1 Application(s) Topical <User Schedule>  Dakins Solution - 1/2 Strength 1 Application(s) Topical two times a day  dexamethasone     Tablet 0.5 milliGRAM(s) Oral daily  dextrose 40% Gel 15 Gram(s) Oral once  dextrose 5% + sodium chloride 0.45%. 1000 milliLiter(s) IV Continuous <Continuous>  dronabinol 2.5 milliGRAM(s) Oral two times a day  epoetin kecia Injectable 5000 Unit(s) SubCutaneous every 7 days  fludroCORTISONE 0.1 milliGRAM(s) Oral daily  gabapentin 200 milliGRAM(s) Oral daily  heparin  Injectable 5000 Unit(s) SubCutaneous every 8 hours  methadone    Tablet 40 milliGRAM(s) Oral three times a day  nystatin Cream 1 Application(s) Topical two times a day  sodium bicarbonate 650 milliGRAM(s) Oral three times a day  testosterone 1% Gel 50 milliGRAM(s) Topical daily      ***************************************************  RADIOLOGY & ADDITIONAL TESTS:    Imaging Personally Reviewed:  [ ] YES  [ ] NO    HEALTH ISSUES - PROBLEM Dx:  Lumbago without sciatica: Lumbago without sciatica

## 2019-07-07 NOTE — CHART NOTE - NSCHARTNOTEFT_GEN_A_CORE
Patient refused labs this morning. States that he recently received a lot of blood work the other day and that today he does not want any blood work done. Blood cannot be taken from midline as values will not be accurate. Risks of getting lab work done were explained to patient, along with the risks of not being able to perform the necessary labs. Patient was able to understand and verbalize these concerns and still stated that he did not want lab work done for today.

## 2019-07-07 NOTE — PROGRESS NOTE ADULT - ASSESSMENT
58 years old male with reported PMH of HTN, chronic back pain, chronic anemia, L1 burst fracture with paraplegia since 1986 (bedbound and wheelchair bound), PAD s/p recent BKA on 6/4, neurogenic bladder with suprapubic catheter presented with weakness and generalized pain    # Acute on chronic normocytic anemia  - Hgb 6 on admission, baseline 8-9  - No signs of active bleeding, rectal exam shows brown stool  - repeat labs/ active T+S, may need therapeutic transfusion if hgb drops below 7  # h/h noted, no venofer elevated sat, started on  procrit 5000 units s/c weekly    # Oliguric SILVINA on CKD (now stage 4) w/ Hyperchloremic Metabolic Acidosis   (Cr 4.9, baseline 1.1 in   8/ 2018; 3.5 mg% on June 18,2019)  - due to ATN most likely   -FeNA>1% CPK - 20  - cont IVF current    - cont po NA bicarb 650 tid  - Stable  - Kidney US on 6/4 shows no hydronephrosis, bilateral echogenic kidney compatible with medical renal disease  - monitor K level   decreased  IVF to 75/hr  - if creatinine does not improve or vomiting persists, will need tesio and initiation of RRT/ no acute indication at the moment  - ? benefit of kidney biops  -check c3,c4, JOSE D, DNA, K/L ratio, IF, hepatitis profile, ANCA     #Vomiting  typical of a first bite dysphagia picture, vs may be secondary to uremia/ although mentation has been improving  would check barium esophagram r/o esophageal dysmotility. avoid extremes in temperature for feedings  initiated calorie count,  po intake has been minimal per reports.      #Dysuria  improved after irrigation of SPC  irrigate 100cc sterile water q shift  empiric ceftriaxone as patient with active sediment and symptoms    ## Persistent Hypoglycemia ( asymptomatic) > Would r/o adrenal insufficiency.   c/w dextrose in IVF     cortisol level >> WNL  - acth stim test marginally abnormal, discussed with endocrinology attending,  started dexamethasone 0.5 mg po qd  already on florinef  - ACTH low >> Get MRI pituitary .        # HTN  - poorly controlled  - would start on amlodipine  and monitor.     # DM2?  - FS running low; also poor oral intake  - HbA1C  5.5    # Chronic back pain with functional paraplegia  - suspect a component of toxic encephalopathy 2/2 polypharmacy and poor renal clearance  slowly deescalating methadone and gabapentin doseages    # Anxiety  - deescalating benzos      DVT ppx: Heparin subQ  GI ppx: Not indicated  Diet: DASH  Activity: increase as tolerated  Code status: full code      #Progress Note Handoff  Pending (specify): improvement in FS's/ mentation/ oral intake/  f/u calorie count  Family discussion: D/W patient and the mother at bedside  Disposition: Possible SNF

## 2019-07-07 NOTE — PROGRESS NOTE ADULT - SUBJECTIVE AND OBJECTIVE BOX
Nephrology progress note    Patient is seen and examined, events over the last 24 h noted .    Allergies:  sulfamethizole (Unknown)    Hospital Medications:   MEDICATIONS  (STANDING):  cefTRIAXone   IVPB 1000 milliGRAM(s) IV Intermittent every 24 hours  chlorhexidine 4% Liquid 1 Application(s) Topical <User Schedule>  Dakins Solution - 1/2 Strength 1 Application(s) Topical two times a day  dexamethasone     Tablet 0.5 milliGRAM(s) Oral daily  dextrose 40% Gel 15 Gram(s) Oral once  dextrose 5% + sodium chloride 0.45%. 1000 milliLiter(s) (75 mL/Hr) IV Continuous <Continuous>  dronabinol 2.5 milliGRAM(s) Oral two times a day  epoetin kecia Injectable 5000 Unit(s) SubCutaneous every 7 days  fludroCORTISONE 0.1 milliGRAM(s) Oral daily  gabapentin 200 milliGRAM(s) Oral daily  heparin  Injectable 5000 Unit(s) SubCutaneous every 8 hours  methadone    Tablet 40 milliGRAM(s) Oral three times a day  nystatin Cream 1 Application(s) Topical two times a day  sodium bicarbonate 650 milliGRAM(s) Oral three times a day  testosterone 1% Gel 50 milliGRAM(s) Topical daily        VITALS:  T(F): 96.9 (07-07-19 @ 13:20), Max: 97.5 (07-06-19 @ 20:43)  HR: 76 (07-07-19 @ 13:20)  BP: 154/78 (07-07-19 @ 13:20)  RR: 18 (07-07-19 @ 13:20)  SpO2: 100% (07-06-19 @ 20:00)  Wt(kg): --    07-05 @ 07:01  -  07-06 @ 07:00  --------------------------------------------------------  IN: 0 mL / OUT: 1050 mL / NET: -1050 mL    07-06 @ 07:01  -  07-07 @ 07:00  --------------------------------------------------------  IN: 0 mL / OUT: 4300 mL / NET: -4300 mL          PHYSICAL EXAM:  Constitutional: NAD  HEENT: anicteric sclera, oropharynx clear, MMM  Neck: No JVD  Respiratory: CTAB, no wheezes, rales or rhonchi  Cardiovascular: S1, S2, RRR  Gastrointestinal: BS+, soft, NT/ND  Extremities: No cyanosis or clubbing. No peripheral edema  :  No brown.   Skin: No rashes    LABS:  07-06    144  |  112<H>  |  37<H>  ----------------------------<  57<L>  3.8   |  20  |  4.4<HH>    Ca    7.4<L>      06 Jul 2019 07:13  Phos  6.2     07-06                            8.6    9.80  )-----------( 353      ( 06 Jul 2019 07:13 )             27.7       Urine Studies:    Protein/Creatinine Ratio Calculation: 12.8 Ratio (07-05 @ 23:30)  Creatinine, Random Urine: 16 mg/dL (07-05 @ 23:30)  Chloride, Random Urine: 51 (07-01 @ 23:00)  Sodium, Random Urine: 60.0 mmoL/L (07-01 @ 23:00)  Potassium, Random Urine: 19 mmol/L (07-01 @ 23:00)    RADIOLOGY & ADDITIONAL STUDIES:

## 2019-07-07 NOTE — PROGRESS NOTE ADULT - ASSESSMENT
Pt paraplegic, significant decubitus ulcer, s/p BKA. serum creatinine ~ 4.4 stable. GI upset noted. may require RRT soon.

## 2019-07-08 NOTE — PROGRESS NOTE ADULT - ASSESSMENT
58 years old male with reported PMH of HTN, chronic back pain, chronic anemia, L1 burst fracture with paraplegia since 1986 (bedbound and wheelchair bound), PAD s/p recent BKA on 6/4, neurogenic bladder with suprapubic catheter presented with weakness and generalized pain.    Patient has spent 12 days in the hospital in hospital. Patient seen and examined. Patient is looking better and responsive.     #Pain Med de-escalation  Changing methadone tid to bid once QT interval comes back on EKG    # Acute on chronic normocytic anemia  - Hgb 6 on admission, baseline 8-9  -Procrit 5000 units subcut/week started    # Oliguric SILVINA on CKD (now stage 4) deveopled in past 6 months   w/ Hyperchloremic Metabolic Acidosis   (Cr 4.9, baseline 1.1 in   8/ 2018; 3.5 mg% on June 18,2019)  nephrology rec:  no indication for RRT at this point. -FeNA>1% CPK - 20  - continuing po NA bicarb 650 tid  - Stable Cr  - Kidney US on 6/4 shows no hydronephrosis, bilateral echogenic kidney compatible with medical renal disease  - monitor K level     #Pressure ulcers  On sacrum, Rt and Lt buttock  followed by burn    #Foot ulcer  Podiatry consulted  Wound assessed and will be followed  Cleaned with water and soap and apply Allevyn Daily   Offloading to L Heel      #Nausea and Vomiting  GI consulted for esophagogram due to low finger sticks  GI suggested it to be metabolic in origin   Patient refused EGD/colonoscopy possibility    #Dysuria  improved after irrigation of SPC  patient reported that he has been changed SPC several days prior to admission at home and has been going up and down sizes whenever it leaked  UA/UCx sent, r/o CAUTI  irrigate 100cc sterile water q shift    #Persistent Hypoglycemia ( asymptomatic)    r/o adrenal insufficiency.   acth stim test marginally abnormal,  repeated ACTH, start dexamethasone 0.5 mg po qd  already on florinef  improving finger sticks  MR head ordered    # HTN  - Well- controlled  - Monitor off medication for now    # DM2  - FS running low; also poor oral intake  - HbA1C  5.5    # Chronic back pain with functional paraplegia  -Pain management on board  -slowly deescalating methadone and gabapentin doseages    # Anxiety  - deescalating benzos      DVT ppx: Heparin subQ  GI ppx: Not indicated  Diet: Renal restricted  Activity: increase as tolerated  Code status: full code

## 2019-07-08 NOTE — PROGRESS NOTE ADULT - NSHPATTENDINGPLANDISCUSS_GEN_ALL_CORE
primary care team
above
primary care team
the patient and the mother at bedside.
the patient and the mother at bedside.
above
the patient and the mother at bedside.
Patient and Resident

## 2019-07-08 NOTE — PROGRESS NOTE ADULT - ASSESSMENT
58 M w/ PMH of Paraplegia 2/2 Lumbar compression Fx, sacral decubiti and heel ulcers s/p debridements,, PAD, suprapubic cath, Neuropathy, DM2 & HTN , Rt gsngrene, sp BKA admitted with s/p fall found to be in SILVINA and have hyperKalemia.  #creatinine trending down    # non oliguric   # patient with significant decrease in GFR in view of BKA  # cr downtrending RRT/ no acute indication but reamins to be seen where Cr stabilizes  # GI notes appreciated   # ? benefit of kidney biopsy  # check c3,c4,  hepatitis profile, unremarkable. f/u JOSE D, DNA, K/L ratio, IF, ANCA   # 12.8 grams proteinuria  # continue sodium bicarbonate po  # phos elevated start sevelmaer one tab TID w/ meals   # decrease iv fluid to 75 cc/h   # h/h noted, no venofer elevated sat, on  procrit 5000 units s/c weekly  # will follow

## 2019-07-08 NOTE — PROGRESS NOTE ADULT - SUBJECTIVE AND OBJECTIVE BOX
PODIATRY PROGRESS NOTE   [id number] MARGE BELLA is a pleasant well-nourished, well developed 58y Male in no acute distress, alert awake, and oriented to person, place and time.   Patient is a 58y old  Male who presents with a chief complaint of Acute kidney injury, anemia (07 Jul 2019 13:32)    HPI:  58 years old male with reported PMH of HTN, chronic back pain, chronic anemia, L1 burst fracture with paraplegia since 1986 (bedbound and wheelchair bound), PAD s/p recent BKA on 6/4, neurogenic bladder with suprapubic catheter (recently changed 3 days ago) presented with weakness and generalized pain. History was also obtained from patient's mother at bedside. Patient was found to be on the floor this morning by his mother. Patient states that he was trying to transfer himself from bed to his wheelchair but he fell on the floor. Patient denies head trauma or LOC. His mother later found him and called the ambulance. Patient was recently admitted to [facility] for RLE gangrene and had BKA done on 6/4. Patient was discharged on 6/21 to SNF initially but patient refused, so he was discharged home instead. Patient has fallen at home 3 times since his discharge. Patient reports weakness, bilateral shoulder pain, and chronic low back pain. Patient denies fever/chills, cough, chest pain, shortness of breath, abdominal pain, nausea/vomiting, diarrhea/constipation. Patient was found to be anemic with hemoglobin of 6 (baseline 8-9) and elevated creatinine of 4.8 (baseline 0.8 in 2018, 3.8 in 2019).    In ED: 1 unit PRBC, 1L NS (26 Jun 2019 20:12)    pt seen and assessed am rounds at bedside.  pt dressing clean dry intact.    Vital Signs Last 24 Hrs  T(C): 36.1 (08 Jul 2019 05:03), Max: 36.5 (07 Jul 2019 21:00)  T(F): 96.9 (08 Jul 2019 05:03), Max: 97.7 (07 Jul 2019 21:00)  HR: 80 (08 Jul 2019 05:03) (76 - 84)  BP: 133/80 (08 Jul 2019 05:03) (133/80 - 154/78)  BP(mean): --  RR: 18 (08 Jul 2019 05:03) (18 - 18)  SpO2: 99% (07 Jul 2019 19:56) (99% - 99%)                        8.9    10.16 )-----------( 320      ( 08 Jul 2019 05:09 )             28.6                 07-08    141  |  109  |  34<H>  ----------------------------<  68<L>  3.4<L>   |  20  |  4.0<H>    Ca    7.4<L>      08 Jul 2019 05:09      Physical Exam - Lower Extremity Focused:   Derm:   Pressure Ulceration Left lateral malleolus   - Wound Edges + Irregular + Clean,  - Wound base Fibrotic/Necrotic, wound size (1.9 cm X 1.4 cm X 0.1cm), - fluctuance, - probe to bone, - Tunneling,  - Lyric-wound skin - Erythema, - Streaking erythema, - Warmth  - Edema  - no Drainage  Vascular:   DP and PT pulses non-palpable . L foot cool to touch.  Neuro:  - Protective sensation diminished.          Assessment:   Stage 2 Pressure ulcer left lateral malleolus, stable, no signs of infection   Plan:  Chart reviewed and Patient evaluated  Discussed diagnosis and treatment with patient  Wound Care Orders: Clean with water and soap and apply Allevyn Daily   Offloading to L Heel  reconsult as  needed  f/u o/p in  1 week 242 chriss mccarty 3.  attending updated   07-08-19 @ 09:50 PODIATRY PROGRESS NOTE   [id number] MARGE BELLA is a pleasant well-nourished, well developed 58y Male in no acute distress, alert awake, and oriented to person, place and time.   Patient is a 58y old  Male who presents with a chief complaint of Acute kidney injury, anemia (07 Jul 2019 13:32)    HPI:  58 years old male with reported PMH of HTN, chronic back pain, chronic anemia, L1 burst fracture with paraplegia since 1986 (bedbound and wheelchair bound), PAD s/p recent BKA on 6/4, neurogenic bladder with suprapubic catheter (recently changed 3 days ago) presented with weakness and generalized pain. History was also obtained from patient's mother at bedside. Patient was found to be on the floor this morning by his mother. Patient states that he was trying to transfer himself from bed to his wheelchair but he fell on the floor. Patient denies head trauma or LOC. His mother later found him and called the ambulance. Patient was recently admitted to [facility] for RLE gangrene and had BKA done on 6/4. Patient was discharged on 6/21 to SNF initially but patient refused, so he was discharged home instead. Patient has fallen at home 3 times since his discharge. Patient reports weakness, bilateral shoulder pain, and chronic low back pain. Patient denies fever/chills, cough, chest pain, shortness of breath, abdominal pain, nausea/vomiting, diarrhea/constipation. Patient was found to be anemic with hemoglobin of 6 (baseline 8-9) and elevated creatinine of 4.8 (baseline 0.8 in 2018, 3.8 in 2019).    In ED: 1 unit PRBC, 1L NS (26 Jun 2019 20:12)    pt seen and assessed am rounds at bedside.  pt dressing clean dry intact.    Vital Signs Last 24 Hrs  T(C): 36.1 (08 Jul 2019 05:03), Max: 36.5 (07 Jul 2019 21:00)  T(F): 96.9 (08 Jul 2019 05:03), Max: 97.7 (07 Jul 2019 21:00)  HR: 80 (08 Jul 2019 05:03) (76 - 84)  BP: 133/80 (08 Jul 2019 05:03) (133/80 - 154/78)  BP(mean): --  RR: 18 (08 Jul 2019 05:03) (18 - 18)  SpO2: 99% (07 Jul 2019 19:56) (99% - 99%)                        8.9    10.16 )-----------( 320      ( 08 Jul 2019 05:09 )             28.6                 07-08    141  |  109  |  34<H>  ----------------------------<  68<L>  3.4<L>   |  20  |  4.0<H>    Ca    7.4<L>      08 Jul 2019 05:09      Physical Exam - Lower Extremity Focused:   Derm:   Pressure Ulceration Left lateral malleolus   - Wound Edges + Irregular + Clean,  - Wound base Fibrotic/Necrotic, wound size (1.9 cm X 1.4 cm X 0.1cm), - fluctuance, - probe to bone, - Tunneling,  - Lyric-wound skin - Erythema, - Streaking erythema, - Warmth  - Edema  - no Drainage  Vascular:   DP and PT pulses non-palpable . L foot cool to touch.  Neuro:  - Protective sensation diminished.          Assessment:   Stage 2 Pressure ulcer left lateral malleolus, stable, no signs of infection   Plan:  Chart reviewed and Patient evaluated  Discussed diagnosis and treatment with patient and attending   Wound Care Orders: Clean with water and soap and apply Allevyn Daily   Offloading to L Heel  reconsult as  needed  f/u o/p in  1 week 242 chriss mccarty 3.  attending updated   07-08-19 @ 09:50

## 2019-07-08 NOTE — PROGRESS NOTE ADULT - SUBJECTIVE AND OBJECTIVE BOX
Nephrology progress note    Patient was seen and examined, events over the last 24 h noted .  cr improved 4.4 -> 4    Allergies:  sulfamethizole (Unknown)    Hospital Medications:   MEDICATIONS  (STANDING):  cefTRIAXone   IVPB 1000 milliGRAM(s) IV Intermittent every 24 hours  chlorhexidine 4% Liquid 1 Application(s) Topical <User Schedule>  Dakins Solution - 1/2 Strength 1 Application(s) Topical two times a day  dexamethasone     Tablet 0.5 milliGRAM(s) Oral daily  dextrose 40% Gel 15 Gram(s) Oral once  dextrose 5% + sodium chloride 0.45%. 1000 milliLiter(s) (75 mL/Hr) IV Continuous <Continuous>  dronabinol 2.5 milliGRAM(s) Oral two times a day  epoetin kecia Injectable 5000 Unit(s) SubCutaneous every 7 days  fludroCORTISONE 0.1 milliGRAM(s) Oral daily  gabapentin 200 milliGRAM(s) Oral daily  heparin  Injectable 5000 Unit(s) SubCutaneous every 8 hours  methadone    Tablet 40 milliGRAM(s) Oral three times a day  sodium bicarbonate 650 milliGRAM(s) Oral three times a day        VITALS:  T(F): 96.9 (07-08-19 @ 05:03), Max: 97.7 (07-07-19 @ 21:00)  HR: 80 (07-08-19 @ 05:03)  BP: 133/80 (07-08-19 @ 05:03)  RR: 18 (07-08-19 @ 05:03)  SpO2: 99% (07-07-19 @ 19:56)  Wt(kg): --    07-06 @ 07:01  -  07-07 @ 07:00  --------------------------------------------------------  IN: 0 mL / OUT: 4300 mL / NET: -4300 mL    07-07 @ 07:01  -  07-08 @ 07:00  --------------------------------------------------------  IN: 210 mL / OUT: 2800 mL / NET: -2590 mL    07-08 @ 07:01  -  07-08 @ 12:58  --------------------------------------------------------  IN: 240 mL / OUT: 0 mL / NET: 240 mL          PHYSICAL EXAM:  	Gen: NAD  	Pulm: CTA B/L  	CV:  S1S2; no rub  	Abd: +distended  	:  SPC  	LE: bka     LABS:  07-08    141  |  109  |  34<H>  ----------------------------<  68<L>  3.4<L>   |  20  |  4.0<H>    Ca    7.4<L>      08 Jul 2019 05:09                            8.9    10.16 )-----------( 320      ( 08 Jul 2019 05:09 )             28.6       Urine Studies:    Protein/Creatinine Ratio Calculation: 12.8 Ratio (07-05 @ 23:30)  Creatinine, Random Urine: 16 mg/dL (07-05 @ 23:30)  Chloride, Random Urine: 51 (07-01 @ 23:00)  Sodium, Random Urine: 60.0 mmoL/L (07-01 @ 23:00)  Potassium, Random Urine: 19 mmol/L (07-01 @ 23:00)    RADIOLOGY & ADDITIONAL STUDIES:

## 2019-07-09 NOTE — PROGRESS NOTE ADULT - SUBJECTIVE AND OBJECTIVE BOX
Nephrology progress note    Patient is seen and examined, events over the last 24 h noted .  denied any complaints  asking for PT and rehab     Allergies:  sulfamethizole (Unknown)    Hospital Medications:   MEDICATIONS  (STANDING):  cefTRIAXone   IVPB 1000 milliGRAM(s) IV Intermittent every 24 hours  Dakins Solution - 1/2 Strength 1 Application(s) Topical two times a day  dexamethasone     Tablet 0.5 milliGRAM(s) Oral daily  dextrose 40% Gel 15 Gram(s) Oral once  dextrose 5% + sodium chloride 0.45%. 1000 milliLiter(s) (75 mL/Hr) IV Continuous <Continuous>  epoetin kecia Injectable 5000 Unit(s) SubCutaneous every 7 days  fludroCORTISONE 0.1 milliGRAM(s) Oral daily  gabapentin 200 milliGRAM(s) Oral daily  heparin  Injectable 5000 Unit(s) SubCutaneous every 8 hours  methadone    Tablet 40 milliGRAM(s) Oral three times a day  potassium chloride    Tablet ER 20 milliEquivalent(s) Oral every 2 hours  sodium bicarbonate 650 milliGRAM(s) Oral three times a day        VITALS:  T(F): 97.6 (07-09-19 @ 06:52), Max: 97.8 (07-08-19 @ 13:48)  HR: 82 (07-09-19 @ 06:52)  BP: 139/65 (07-09-19 @ 06:52)  RR: 18 (07-09-19 @ 06:52)  SpO2: 99% (07-08-19 @ 19:49)      07-07 @ 07:01  -  07-08 @ 07:00  --------------------------------------------------------  IN: 210 mL / OUT: 2800 mL / NET: -2590 mL    07-08 @ 07:01  -  07-09 @ 07:00  --------------------------------------------------------  IN: 480 mL / OUT: 1300 mL / NET: -820 mL          PHYSICAL EXAM:  Constitutional: NAD  HEENT: anicteric sclera, oropharynx clear, MMM  Neck: No JVD  Respiratory: CTAB, no wheezes, rales or rhonchi  Cardiovascular: S1, S2, RRR  Gastrointestinal: BS+, soft, NT/ND  Extremities: No cyanosis or clubbing. left BKA   :  positive SPC   Skin: No rashes    LABS:  07-09    140  |  107  |  33<H>  ----------------------------<  84  3.0<L>   |  20  |  4.1<HH>  Creatinine Trend: 4.1<--, 4.0<--, 4.4<--, 4.5<--, 5.0<--, 5.0<--  Ca    6.9<L>      09 Jul 2019 05:15                            7.7    9.93  )-----------( 292      ( 09 Jul 2019 05:15 )             24.8       Urine Studies:    Protein/Creatinine Ratio Calculation: 12.8 Ratio (07-05 @ 23:30)  Creatinine, Random Urine: 16 mg/dL (07-05 @ 23:30)    RADIOLOGY & ADDITIONAL STUDIES:

## 2019-07-09 NOTE — PROGRESS NOTE ADULT - SUBJECTIVE AND OBJECTIVE BOX
Nephrology progress note    Patient is seen and examined, events over the last 24 h noted .    Allergies:  sulfamethizole (Unknown)    Hospital Medications:   MEDICATIONS  (STANDING):  cefTRIAXone   IVPB 1000 milliGRAM(s) IV Intermittent every 24 hours  chlorhexidine 4% Liquid 1 Application(s) Topical <User Schedule>  Dakins Solution - 1/2 Strength 1 Application(s) Topical two times a day  dexamethasone     Tablet 0.5 milliGRAM(s) Oral daily  dextrose 40% Gel 15 Gram(s) Oral once  dextrose 5% + sodium chloride 0.45%. 1000 milliLiter(s) (75 mL/Hr) IV Continuous <Continuous>  epoetin kecia Injectable 5000 Unit(s) SubCutaneous every 7 days  fludroCORTISONE 0.1 milliGRAM(s) Oral daily  gabapentin 200 milliGRAM(s) Oral daily  heparin  Injectable 5000 Unit(s) SubCutaneous every 8 hours  methadone    Tablet 40 milliGRAM(s) Oral three times a day  potassium chloride    Tablet ER 20 milliEquivalent(s) Oral every 2 hours  sodium bicarbonate 650 milliGRAM(s) Oral three times a day        VITALS:  T(F): 97.6 (07-09-19 @ 06:52), Max: 97.8 (07-08-19 @ 13:48)  HR: 82 (07-09-19 @ 06:52)  BP: 139/65 (07-09-19 @ 06:52)  RR: 18 (07-09-19 @ 06:52)  SpO2: 99% (07-08-19 @ 19:49)  Wt(kg): --    07-07 @ 07:01  -  07-08 @ 07:00  --------------------------------------------------------  IN: 210 mL / OUT: 2800 mL / NET: -2590 mL    07-08 @ 07:01  -  07-09 @ 07:00  --------------------------------------------------------  IN: 480 mL / OUT: 1300 mL / NET: -820 mL          PHYSICAL EXAM:  Constitutional: NAD  HEENT: anicteric sclera, oropharynx clear, MMM  Neck: No JVD  Respiratory: CTAB, no wheezes, rales or rhonchi  Cardiovascular: S1, S2, RRR  Gastrointestinal: BS+, soft, NT/ND  Extremities: No cyanosis or clubbing. No peripheral edema  :  No brown.   Skin: No rashes    LABS:  07-09    140  |  107  |  33<H>  ----------------------------<  84  3.0<L>   |  20  |  4.1<HH>  Creatinine Trend: 4.1<--, 4.0<--, 4.4<--, 4.5<--, 5.0<--, 5.0<--  Ca    6.9<L>      09 Jul 2019 05:15                            7.7    9.93  )-----------( 292      ( 09 Jul 2019 05:15 )             24.8       Urine Studies:    Protein/Creatinine Ratio Calculation: 12.8 Ratio (07-05 @ 23:30)  Creatinine, Random Urine: 16 mg/dL (07-05 @ 23:30)    RADIOLOGY & ADDITIONAL STUDIES:

## 2019-07-09 NOTE — CONSULT NOTE ADULT - ATTENDING COMMENTS
agree with note
patient no longer has type 2 diabetes, please stop fingerstick testing as not reliable because alcohol is used to clean skin, and smearing of blood on test strip, both can cause inaccurate"low" levels. There is no evidence for adrenal insufficiency, so stop fludrocortisone, being a high dose of mineralcorticoid this is causing hypokalemia, requiring K replacement.. stop dexamethasone for the same reason.
Patient seen and discussed.  agree with above.

## 2019-07-09 NOTE — PROGRESS NOTE ADULT - ASSESSMENT
58 years old male with reported PMH of HTN, chronic back pain, chronic anemia, L1 burst fracture with paraplegia since 1986 (bedbound and wheelchair bound), PAD s/p recent BKA on 6/4, neurogenic bladder with suprapubic catheter presented with weakness and generalized pain.    Patient [id number] days in the hospital. Patient seen and examined. Patient is looking better and responsive. MR brain has been done and reported.     #Pain Med de-escalation  No plan to change at the moment since patient complains of pain and is most likely going to be controlled on current dose    # Acute on chronic normocytic anemia  - Hgb 6 on admission, baseline 8-9, Hb 7.7 today  -Procrit 5000 units subcut/week started    # Oliguric SILVINA on CKD (now stage 4) developed in past 6 months   w/ Hyperchloremic Metabolic Acidosis   (Cr 4.9, baseline 1.1 in   8/ 2018; 3.5 mg% on June 18,2019)  nephrology rec:  no indication for RRT at this point. -FeNA>1% CPK - 20  - continuing po NA bicarb 650 tid  - Stable Cr  - Kidney US on 6/4 shows no hydronephrosis, bilateral echogenic kidney compatible with medical renal disease  - monitor K level     #Pressure ulcers  On sacrum, Rt and Lt buttock  followed by burn    #Foot ulcer  Podiatry consulted  Wound assessed and will be followed  Cleaned with water and soap and apply Allevyn Daily   Offloading to L Heel      #Nausea and Vomiting  GI consulted for esophagogram due to low finger sticks  GI suggested it to be metabolic in origin   Patient refused EGD/colonoscopy possibility    #Dysuria  improved after irrigation of SPC  patient reported that he has been changed SPC several days prior to admission at home and has been going up and down sizes whenever it leaked  UA/UCx sent, r/o CAUTI  irrigate 100cc sterile water q shift    #Persistent Hypoglycemia ( asymptomatic)    r/o adrenal insufficiency.   acth stim test marginally abnormal,  repeated ACTH, start dexamethasone 0.5 mg po qd  already on florinef  improving finger sticks  MR head ordered  No evidence of any pituitary mass  waiting for endocrinology feedback regarding adjusting meds    # HTN  - Well- controlled  - Monitor off medication for now    # DM2  - FS running low; also poor oral intake  - HbA1C  5.5    # Chronic back pain with functional paraplegia  -Pain management on board  -slowly deescalating methadone and gabapentin dosages    # Anxiety  - deescalating benzos      DVT ppx: Heparin subQ  GI ppx: Not indicated  Diet: Renal restricted  Activity: increase as tolerated  Code status: full code 58 years old male with reported PMH of HTN, chronic back pain, chronic anemia, L1 burst fracture with paraplegia since 1986 (bedbound and wheelchair bound), PAD s/p recent BKA on 6/4, neurogenic bladder with suprapubic catheter presented with weakness and generalized pain.    Patient [id number] days in the hospital. Patient seen and examined. Patient is looking better and responsive. MR brain has been done and reported.     #Pain Med de-escalation  Going to be attempted tomorrow to enable patient for snf transfer    # Acute on chronic normocytic anemia  - Hgb 6 on admission, baseline 8-9, Hb 7.7 today  -Procrit 5000 units subcut/week started    # Oliguric SILVINA on CKD (now stage 4) developed in past 6 months   w/ Hyperchloremic Metabolic Acidosis   (Cr 4.9, baseline 1.1 in   8/ 2018; 3.5 mg% on June 18,2019)  nephrology rec:  no indication for RRT at this point. -FeNA>1% CPK - 20  - continuing po NA bicarb 650 tid  - Stable Cr  - Kidney US on 6/4 shows no hydronephrosis, bilateral echogenic kidney compatible with medical renal disease  - monitor K level     #Pressure ulcers  On sacrum, Rt and Lt buttock  followed by burn    #Foot ulcer  Podiatry consulted  Wound assessed and will be followed  Cleaned with water and soap and apply Allevyn Daily   Offloading to L Heel      #Nausea and Vomiting  GI consulted for esophagogram due to low finger sticks  GI suggested it to be metabolic in origin   Patient refused EGD/colonoscopy possibility    #Dysuria  improved after irrigation of SPC  patient reported that he has been changed SPC several days prior to admission at home and has been going up and down sizes whenever it leaked  UA/UCx sent, r/o CAUTI  irrigate 100cc sterile water q shift    #Persistent Hypoglycemia ( asymptomatic)    r/o adrenal insufficiency.   acth stim test marginally abnormal,  repeated ACTH, start dexamethasone 0.5 mg po qd  already on florinef  improving finger sticks  MR head ordered  No evidence of any pituitary mass  waiting for endocrinology feedback regarding adjusting meds    # HTN  - Well- controlled  - Monitor off medication for now    # DM2  - FS running low; also poor oral intake  - HbA1C  5.5    # Chronic back pain with functional paraplegia  -Pain management on board  -slowly deescalating methadone and gabapentin dosages    # Anxiety  - deescalating benzos      DVT ppx: Heparin subQ  GI ppx: Not indicated  Diet: Renal restricted  Activity: increase as tolerated  Code status: full code

## 2019-07-09 NOTE — PROGRESS NOTE ADULT - ATTENDING COMMENTS
Patient seen and examined independently of resident. My addendum supersedes resident notation. Case discussed with housestaff, nursing and patient
The patient was seen and examined at bedside.   Agree with a/p above by resident.    Hyperkalemia >> S/P IV insulin/ D50  >> f/u repeat BMP @ 4pm.      Add kayexalate .      Will trend BMP anyways for SILVINA.    Will follow.
Patient seen and examined independently. I agree with the resident's note, physical exam, and plan except as below.    Vital Signs Last 24 Hrs  T(C): 36.3 (09 Jul 2019 13:14), Max: 36.6 (08 Jul 2019 13:48)  T(F): 97.3 (09 Jul 2019 13:14), Max: 97.8 (08 Jul 2019 13:48)  HR: 77 (09 Jul 2019 13:14) (74 - 82)  BP: 158/76 (09 Jul 2019 13:14) (139/65 - 158/76)  BP(mean): --  RR: 18 (09 Jul 2019 13:14) (18 - 18)  SpO2: 99% (08 Jul 2019 19:49) (99% - 99%)    PE  nad  aaox3  [id number]  ctabl  soft tnntd+bs  Right BKA - wound intact  paraplegic   +SPC      # chronic pain control - on methadone and gabapentin - Qtc acceptable - cont current dose for now - will try to wean down on methadone if possible    #SILVINA - Scr slightly downtrending - 4.1 today  follow renal recs for workup    continue sodium bicarbonate po   phos elevated start sevelmaer one tab TID w/ meals    #hypokalmemia - replete , check mg    #hypoglycemia - rule out adrenal insufficiency - sp cosyntrop - borderline  started on dexamethasone , increase florinef to q12 - will need to wean off IVF- endo following  MRI brain negative for pituitary lesions  unclear etiology of persistent hypokalemia - RTA? check urine electrolytes - may not be fully accurate on po bicarb  follow up with nephro - no need for RRT at this time  SILVINA on CKD IV    Creatinine: 4.1 (07-09 @ 05:15)    Creatinine: 4.0 (07-08 @ 05:09)    Creatinine: 4.4 (07-06 @ 07:13)    Creatinine: 4.5 (07-05 @ 06:14)        dispo - SNF as per pt, at baseline is unable to self transfer to wheelchair .    #Progress Note Handoff  Pending (specify):  Consults_________, Tests________, Test Results_______, Other____renal and endo follow up, hypoglycemia and hypokalemia_____  Family discussion: discussed with pt  Disposition: Home___/SNF_xxx__/Other________/Unknown at this time________
Patient seen and examined independently. I agree with the resident's note, physical exam, and plan except as below.  Vital Signs Last 24 Hrs  T(C): 36.6 (08 Jul 2019 13:48), Max: 36.6 (08 Jul 2019 13:48)  T(F): 97.8 (08 Jul 2019 13:48), Max: 97.8 (08 Jul 2019 13:48)  HR: 74 (08 Jul 2019 13:48) (74 - 84)  BP: 142/86 (08 Jul 2019 13:48) (133/80 - 153/75)  BP(mean): --  RR: 18 (08 Jul 2019 13:48) (18 - 18)  SpO2: 99% (07 Jul 2019 19:56) (99% - 99%)  PE  nad  aaox3  [id number]  ctabl  soft tnntd+bs  Right BKA - wound intact  paraplegic  +SPC    only complaint is pain - chronic    # chronic pain control - on methadone and gabapentin - check ekg for Qtc - cont current dose for now - will try to wean down on methadone if possible    #SILVINA - Scr slightly downtrending to 4 today  follow renal recs for workup    continue sodium bicarbonate po   phos elevated start sevelmaer one tab TID w/ meals    Creatinine: 4.0 (07-08 @ 05:09)    Creatinine: 4.4 (07-06 @ 07:13)    Creatinine: 4.5 (07-05 @ 06:14)    Creatinine: 5.0 (07-04 @ 06:36)        #hypoglycemia - rule out adrenal insufficiency - sp cosyntrop - borderline  started on dexamethasone - endo following  MRI brain for pituitary pending    dispo - SNF as per pt, at baseline is unable to self transfer to wheelchair
agree with note    follow up as out patient
I was Physically Present for the key portions of the evaluation   I agree with the above History  , Physical examination Assessment and plan   I have Reviewed , Modified or appended where appropriate.  Please check A and P as above   1- SILVINA on CKD 3-4 / acidosis/ anemia   history of SPC   would continue IVF  continue sodium bicarb  repeat renal bladder sono  no need for RRT  will follow

## 2019-07-09 NOTE — PROGRESS NOTE ADULT - ASSESSMENT
58 M w/ PMH of Paraplegia 2/2 Lumbar compression Fx, sacral decubiti and heel ulcers s/p debridements,, PAD, suprapubic cath, Neuropathy, DM2 & HTN , Rt gsngrene, sp BKA admitted with s/p fall found to be in SILVINA and have hyperKalemia.  #creatinine stable now   # non oliguric   # patient with significant decrease in GFR in view of BKA  # cr downtrending RRT/ no acute indication but reamins to be seen where Cr stabilizes  # GI notes appreciated   # ? benefit of kidney biopsy  #  c3,c4,  hepatitis profile, unremarkable. f/u JOSE D, DNA, K/L ratio, IF, ANCA   # 12.8 grams proteinuria  # continue sodium bicarbonate po  # phos elevated start sevelmaer please one tab TID w/ meals   # DC IVF if able to tolerate po   # follow K levels / on dexamethasone and florinef in the context of Adrenal insufficiency / can increase Florinef to 0.1 q12h and dexamethasone to 1 mg q24h   # h/h noted, no Venofer elevated sat, on  procrit 5000 units s/c weekly  # will follow

## 2019-07-09 NOTE — CHART NOTE - NSCHARTNOTEFT_GEN_A_CORE
Registered Dietitian Follow-Up     Patient Profile Reviewed                           Yes [x]   No []     Nutrition History Previously Obtained        Yes [x]  No []       Pertinent Subjective Information: Documented recent PO intake %. Pt reports 75% PO intake at last two meals. Would like to continue on appetite stimulant as his intake has Improved- noted marinol auto-discontinued. Please note kcal count incomplete- only three meals documented within 3 days. Would recommend ordering new 3 day calorie count. Note pt refuses PO supplementation. Pt reports pressure injuries have been long term and have significant improved- is not concerned about related PO intake. -- Discussed with covering resident.      Pertinent Medical Interventions: Oliguric SILVINA on CKD (now stage IV) developed in past 6 months, stable Cr, no indication for RRT at this point. Pressure injuries on sacrum, R and L buttocks. Foot ulcer noted. Nausea and vomitting- GI c/s for esophagogram due to low finger sticks- suggest it to be metabolic in origin, pt refused EGD/colonoscopy possibility. Persistent hypoglycemia- asymptomatic, s/o adrenal insufficiency- waiting for endocrinology feedback regarding adjusting meds (DM2 noted).     Diet order: Renal restrictions, high fiber     Anthropometrics:  - Ht. 187.96cm  - Wt. 81.4kg (7/7) vs 81.5 kg (7/5) vs 80.3 kg (7/1) relatively stable, will monitor weight changes   - %wt change  - BMI 22.6 adjusted for R BKA,  - IBW: 84kg+/-10%     Pertinent Lab Data: (7/9) H/H 7.7/24.8, K 3.0, BUN 33, Cr 4.1  POCT gluc WDL for DM2     Pertinent Meds: heparin, IV abx, d5w NS @75ml/h, decadron, sodium bicarbonate, florinef     Physical Findings:  - Appearance: AAO  - GI function: LBM 7/7, denies GI s/s at present  - Tubes:  - Oral/Mouth cavity: does not report any changes  - Skin: stage 4 PU to buttocks, stage 3 PU sacrum and R buttocks      Nutrition Requirements  Weight Used: needs cont from RD assessment 6/28      Estimated Energy Needs    Continue [x]  Adjust [] 1993 - 2159 kcals/day (MSJ x 1.2 - 1.3 AF for PU)  Adjusted Energy Recommendations:   kcal/day        Estimated Protein Needs    Continue [x]  Adjust [] 68 - 84  gms/day (0.9 - 1.1 gm/kg for PU and renal fxn) will adjust prn   Adjusted Protein Recommendations:   gm/day        Estimated Fluid Needs        Continue [x]  Adjust [] 1ml/kcal or per LIP  Adjusted Fluid Recommendations:   mL/day     Nutrient Intake: likely not meeting needs, starting to improve        [] Previous Nutrition Diagnosis: Increased Nutrient Needs + inadequate oral intake            [x] Ongoing          [] Resolved    [] No active nutrition diagnosis identified at this time     Nutrition Intervention meal/snack, nutrition-related medication management     Goal/Expected Outcome: Pt to consume >75% of meal tray in the next 3 days      Indicator/Monitoring: RD to monitor energy intke, diet order. body comp, NFPF, renal/glucose profile     Recommendation: Continue renal restricted diet + high fiber. Please restart appetite stimulant. Consider ordering new calorie count as results were incomplete.    Pt at risk f/u 3 days

## 2019-07-09 NOTE — PROGRESS NOTE ADULT - SUBJECTIVE AND OBJECTIVE BOX
58 years old male with reported PMH of HTN, chronic back pain, chronic anemia, L1 burst fracture with paraplegia since 1986 (bedbound and wheelchair bound), PAD s/p recent BKA on 6/4, neurogenic bladder with suprapubic catheter (recently changed 3 days ago) presented with weakness and generalized pain. History was also obtained from patient's mother at bedside. Patient was found to be on the floor this morning by his mother. Patient states that he was trying to transfer himself from bed to his wheelchair but he fell on the floor. Patient denies head trauma or LOC. His mother later found him and called the ambulance. Patient was recently admitted to [facility] for RLE gangrene and had BKA done on 6/4. Patient was discharged on 6/21 to SNF initially but patient refused, so he was discharged home instead. Patient has fallen at home 3 times since his discharge. Patient reports weakness, bilateral shoulder pain, and chronic low back pain. Patient denies fever/chills, cough, chest pain, shortness of breath, abdominal pain, nausea/vomiting, diarrhea/constipation. Patient was found to be anemic with hemoglobin of 6 (baseline 8-9) and elevated creatinine of 4.8 (baseline 0.8 in 2018, 3.8 in 2019).    PAST MEDICAL & SURGICAL HISTORY:  Anemia  PAD (peripheral artery disease)  Hypertension  Suprapubic catheter  Pressure ulcer  Diabetes  Lumbar compression fracture  S/P below knee amputation, right  S/P debridement  Toe amputation status, right  H/O hernia repair      OVERNIGHT EVENTS:    SUBJECTIVE / INTERVAL HPI: Patient seen and examined at bedside.     VITAL SIGNS:  Vital Signs Last 24 Hrs  T(C): 36.4 (09 Jul 2019 06:52), Max: 36.6 (08 Jul 2019 13:48)  T(F): 97.6 (09 Jul 2019 06:52), Max: 97.8 (08 Jul 2019 13:48)  HR: 82 (09 Jul 2019 06:52) (74 - 82)  BP: 139/65 (09 Jul 2019 06:52) (139/65 - 142/86)  BP(mean): --  RR: 18 (09 Jul 2019 06:52) (18 - 18)  SpO2: 99% (08 Jul 2019 19:49) (99% - 99%)    PHYSICAL EXAM:    General: WDWN  HEENT: NC/AT; PERRL, anicteric sclera; MMM  Neck: supple  Cardiovascular: +S1/S2, RRR  Respiratory: CTA B/L; no W/R/R  Gastrointestinal: soft, NT/ND; +BSx4  Extremities: WWP; no edema, clubbing or cyanosis  Vascular: 2+ radial, DP/PT pulses B/L  Neurological: AAOx3; no focal deficits    MEDICATIONS:  MEDICATIONS  (STANDING):  cefTRIAXone   IVPB 1000 milliGRAM(s) IV Intermittent every 24 hours  chlorhexidine 4% Liquid 1 Application(s) Topical <User Schedule>  Dakins Solution - 1/2 Strength 1 Application(s) Topical two times a day  dexamethasone     Tablet 0.5 milliGRAM(s) Oral daily  dextrose 40% Gel 15 Gram(s) Oral once  dextrose 5% + sodium chloride 0.45%. 1000 milliLiter(s) (75 mL/Hr) IV Continuous <Continuous>  epoetin kecia Injectable 5000 Unit(s) SubCutaneous every 7 days  fludroCORTISONE 0.1 milliGRAM(s) Oral daily  gabapentin 200 milliGRAM(s) Oral daily  heparin  Injectable 5000 Unit(s) SubCutaneous every 8 hours  methadone    Tablet 40 milliGRAM(s) Oral three times a day  potassium chloride    Tablet ER 20 milliEquivalent(s) Oral every 2 hours  sodium bicarbonate 650 milliGRAM(s) Oral three times a day    MEDICATIONS  (PRN):  acetaminophen   Tablet .. 650 milliGRAM(s) Oral once PRN Moderate Pain (4 - 6)  ALPRAZolam 0.5 milliGRAM(s) Oral at bedtime PRN insomnia      ALLERGIES:  Allergies    sulfamethizole (Unknown)    Intolerances        LABS:                        7.7    9.93  )-----------( 292      ( 09 Jul 2019 05:15 )             24.8     07-09    140  |  107  |  33<H>  ----------------------------<  84  3.0<L>   |  20  |  4.1<HH>    Ca    6.9<L>      09 Jul 2019 05:15          CAPILLARY BLOOD GLUCOSE      POCT Blood Glucose.: 74 mg/dL (09 Jul 2019 07:49)      RADIOLOGY & ADDITIONAL TESTS: Reviewed.

## 2019-07-09 NOTE — CONSULT NOTE ADULT - SUBJECTIVE AND OBJECTIVE BOX
Patient is a 58y old  Male who presents with a chief complaint of Acute kidney injury, anemia (09 Jul 2019 11:16)      HPI:  58 years old male with reported PMH of HTN, chronic back pain, chronic anemia, L1 burst fracture with paraplegia since 1986 (bedbound and wheelchair bound), PAD s/p recent BKA on 6/4, neurogenic bladder with suprapubic catheter (recently changed 3 days ago) presented with weakness and generalized pain. History was also obtained from patient's mother at bedside. Patient was found to be on the floor this morning by his mother. Patient states that he was trying to transfer himself from bed to his wheelchair but he fell on the floor. Patient denies head trauma or LOC. His mother later found him and called the ambulance. Patient was recently admitted to [facility] for RLE gangrene and had BKA done on 6/4. Patient was discharged on 6/21 to SNF initially but patient refused, so he was discharged home instead. Patient has fallen at home 3 times since his discharge. Patient reports weakness, bilateral shoulder pain, and chronic low back pain. Patient denies fever/chills, cough, chest pain, shortness of breath, abdominal pain, nausea/vomiting, diarrhea/constipation. Patient was found to be anemic with hemoglobin of 6 (baseline 8-9) and elevated creatinine of 4.8 (baseline 0.8 in 2018, 3.8 in 2019).    In ED: 1 unit PRBC, 1L NS (26 Jun 2019 20:12)      PAST MEDICAL & SURGICAL HISTORY:  Anemia  PAD (peripheral artery disease)  Hypertension  Suprapubic catheter  Pressure ulcer  Diabetes  Lumbar compression fracture  S/P below knee amputation, right  S/P debridement  Toe amputation status, right  H/O hernia repair      SOCIAL HX:   Smoking                         ETOH                            Other    FAMILY HISTORY:  No pertinent family history in first degree relatives  .  No cardiovascular or pulmonary family history     Review of System:  See HPI    Allergies    sulfamethizole (Unknown)    Intolerances          PHYSICAL EXAM  Vital Signs Last 24 Hrs  T(C): 36.3 (09 Jul 2019 13:14), Max: 36.4 (09 Jul 2019 06:52)  T(F): 97.3 (09 Jul 2019 13:14), Max: 97.6 (09 Jul 2019 06:52)  HR: 77 (09 Jul 2019 13:14) (76 - 82)  BP: 158/76 (09 Jul 2019 13:14) (139/65 - 158/76)  BP(mean): --  RR: 18 (09 Jul 2019 13:14) (18 - 18)  SpO2: 99% (08 Jul 2019 19:49) (99% - 99%)    General: In NAD  HEENT: SUJIT             Lymphatic system: No cervical LN     Lungs: Cyril BS  Cardiovascular: Regular  Gastrointestinal: Soft.  + BS   Musculoskeletal: No Clubbing.  Full range of motion.. Moves all extremities  Skin: Warm.  Intact  Neurological: No motor or sensory deficit       LABS:                          7.7    9.93  )-----------( 292      ( 09 Jul 2019 05:15 )             24.8                                               07-09    140  |  107  |  33<H>  ----------------------------<  84  3.0<L>   |  20  |  4.1<HH>    Ca    6.9<L>      09 Jul 2019 05:15                      MEDICATIONS  (STANDING):  cefTRIAXone   IVPB 1000 milliGRAM(s) IV Intermittent every 24 hours  chlorhexidine 4% Liquid 1 Application(s) Topical <User Schedule>  Dakins Solution - 1/2 Strength 1 Application(s) Topical two times a day  dexamethasone     Tablet 0.5 milliGRAM(s) Oral daily  dextrose 40% Gel 15 Gram(s) Oral once  dextrose 5% + sodium chloride 0.45%. 1000 milliLiter(s) (75 mL/Hr) IV Continuous <Continuous>  epoetin kecia Injectable 5000 Unit(s) SubCutaneous every 7 days  fludroCORTISONE 0.1 milliGRAM(s) Oral every 12 hours  gabapentin 200 milliGRAM(s) Oral daily  heparin  Injectable 5000 Unit(s) SubCutaneous every 8 hours  methadone    Tablet 40 milliGRAM(s) Oral three times a day  sodium bicarbonate 650 milliGRAM(s) Oral three times a day    MEDICATIONS  (PRN):  acetaminophen   Tablet .. 650 milliGRAM(s) Oral once PRN Moderate Pain (4 - 6)  ALPRAZolam 0.5 milliGRAM(s) Oral at bedtime PRN insomnia Patient is a 58y old  Male who presents with a chief complaint of Acute kidney injury, anemia (09 Jul 2019 11:16)      HPI:  58 years old male with reported PMH of HTN, chronic back pain, chronic anemia, L1 burst fracture with paraplegia since 1986 (bedbound and wheelchair bound), PAD s/p recent BKA on 6/4, neurogenic bladder with suprapubic catheter (recently changed 3 days ago) presented with weakness and generalized pain. History was also obtained from patient's mother at bedside. Patient was found to be on the floor this morning by his mother. Patient states that he was trying to transfer himself from bed to his wheelchair but he fell on the floor. Patient denies head trauma or LOC. His mother later found him and called the ambulance. Patient was recently admitted to [facility] for RLE gangrene and had BKA done on 6/4. Patient was discharged on 6/21 to SNF initially but patient refused, so he was discharged home instead. Patient has fallen at home 3 times since his discharge. Patient reports weakness, bilateral shoulder pain, and chronic low back pain. Patient denies fever/chills, cough, chest pain, shortness of breath, abdominal pain, nausea/vomiting, diarrhea/constipation. Patient was found to be anemic with hemoglobin of 6 (baseline 8-9) and elevated creatinine of 4.8 (baseline 0.8 in 2018, 3.8 in 2019).    In ED: 1 unit PRBC, 1L NS (26 Jun 2019 20:12)      PAST MEDICAL & SURGICAL HISTORY:  Anemia  PAD (peripheral artery disease)  Hypertension  Suprapubic catheter  Pressure ulcer  Diabetes  Lumbar compression fracture  S/P below knee amputation, right  S/P debridement  Toe amputation status, right  H/O hernia repair    SOCIAL HX:  Daily smoker for 40 years, intermittent marijuana use; Denies alcohol use    FAMILY HISTORY:  No pertinent family history in first degree relatives  .  No cardiovascular or pulmonary family history     Review of System:  See HPI    Allergies    sulfamethizole (Unknown)    Intolerances      PHYSICAL EXAM  Vital Signs Last 24 Hrs  T(C): 36.3 (09 Jul 2019 13:14), Max: 36.4 (09 Jul 2019 06:52)  T(F): 97.3 (09 Jul 2019 13:14), Max: 97.6 (09 Jul 2019 06:52)  HR: 77 (09 Jul 2019 13:14) (76 - 82)  BP: 158/76 (09 Jul 2019 13:14) (139/65 - 158/76)  BP(mean): --  RR: 18 (09 Jul 2019 13:14) (18 - 18)  SpO2: 99% (08 Jul 2019 19:49) (99% - 99%)      General: In NAD  HEENT: SUJIT             Lymphatic system: No cervical LN     Lungs: Cyril BS  Cardiovascular: Regular  Gastrointestinal: Soft.  + BS   Musculoskeletal: No Clubbing. paraplegic   Skin: Warm.  Intact  Neurological: No motor or sensory deficit       LABS:                          7.7    9.93  )-----------( 292      ( 09 Jul 2019 05:15 )             24.8                                               07-09    140  |  107  |  33<H>  ----------------------------<  84  3.0<L>   |  20  |  4.1<HH>    Ca    6.9<L>      09 Jul 2019 05:15                      MEDICATIONS  (STANDING):  cefTRIAXone   IVPB 1000 milliGRAM(s) IV Intermittent every 24 hours  chlorhexidine 4% Liquid 1 Application(s) Topical <User Schedule>  Dakins Solution - 1/2 Strength 1 Application(s) Topical two times a day  dexamethasone     Tablet 0.5 milliGRAM(s) Oral daily  dextrose 40% Gel 15 Gram(s) Oral once  dextrose 5% + sodium chloride 0.45%. 1000 milliLiter(s) (75 mL/Hr) IV Continuous <Continuous>  epoetin kecia Injectable 5000 Unit(s) SubCutaneous every 7 days  fludroCORTISONE 0.1 milliGRAM(s) Oral every 12 hours  gabapentin 200 milliGRAM(s) Oral daily  heparin  Injectable 5000 Unit(s) SubCutaneous every 8 hours  methadone    Tablet 40 milliGRAM(s) Oral three times a day  sodium bicarbonate 650 milliGRAM(s) Oral three times a day    MEDICATIONS  (PRN):  acetaminophen   Tablet .. 650 milliGRAM(s) Oral once PRN Moderate Pain (4 - 6)  ALPRAZolam 0.5 milliGRAM(s) Oral at bedtime PRN insomnia

## 2019-07-10 NOTE — PROGRESS NOTE ADULT - SUBJECTIVE AND OBJECTIVE BOX
58 years old male with reported PMH of HTN, chronic back pain, chronic anemia, L1 burst fracture with paraplegia since 1986 (bedbound and wheelchair bound), PAD s/p recent BKA on 6/4, neurogenic bladder with suprapubic catheter (recently changed 3 days ago) presented with weakness and generalized pain. History was also obtained from patient's mother at bedside. Patient was found to be on the floor this morning by his mother. Patient states that he was trying to transfer himself from bed to his wheelchair but he fell on the floor. Patient denies head trauma or LOC. His mother later found him and called the ambulance. Patient was recently admitted to [facility] for RLE gangrene and had BKA done on 6/4. Patient was discharged on 6/21 to SNF initially but patient refused, so he was discharged home instead. Patient has fallen at home 3 times since his discharge. Patient reports weakness, bilateral shoulder pain, and chronic low back pain. Patient denies fever/chills, cough, chest pain, shortness of breath, abdominal pain, nausea/vomiting, diarrhea/constipation. Patient was found to be anemic with hemoglobin of 6 (baseline 8-9) and elevated creatinine of 4.8 (baseline 0.8 in 2018, 3.8 in 2019).    PAST MEDICAL & SURGICAL HISTORY:  Anemia  PAD (peripheral artery disease)  Hypertension  Suprapubic catheter  Pressure ulcer  Diabetes  Lumbar compression fracture  S/P below knee amputation, right  S/P debridement  Toe amputation status, right  H/O hernia repair    OVERNIGHT EVENTS:    SUBJECTIVE / INTERVAL HPI: Patient seen and examined at bedside.     VITAL SIGNS:  Vital Signs Last 24 Hrs  T(C): 35.7 (10 Jul 2019 05:30), Max: 36.3 (09 Jul 2019 13:14)  T(F): 96.2 (10 Jul 2019 05:30), Max: 97.3 (09 Jul 2019 13:14)  HR: 85 (10 Jul 2019 05:30) (77 - 85)  BP: 167/79 (10 Jul 2019 05:30) (158/76 - 181/86)  BP(mean): --  RR: 18 (10 Jul 2019 05:30) (18 - 18)  SpO2: 100% (09 Jul 2019 19:35) (100% - 100%)    PHYSICAL EXAM:    General: WDWN  HEENT: NC/AT; PERRL, anicteric sclera; MMM  Neck: supple  Cardiovascular: +S1/S2, RRR  Respiratory: CTA B/L; no W/R/R  Gastrointestinal: soft, NT/ND; +BSx4  Extremities: WWP; no edema, clubbing or cyanosis  Vascular: 2+ radial, DP/PT pulses B/L  Neurological: AAOx3; no focal deficits    MEDICATIONS:  MEDICATIONS  (STANDING):  cefTRIAXone   IVPB 1000 milliGRAM(s) IV Intermittent every 24 hours  chlorhexidine 4% Liquid 1 Application(s) Topical <User Schedule>  Dakins Solution - 1/2 Strength 1 Application(s) Topical two times a day  dextrose 40% Gel 15 Gram(s) Oral once  dextrose 5% + sodium chloride 0.45%. 1000 milliLiter(s) (75 mL/Hr) IV Continuous <Continuous>  epoetin kecia Injectable 5000 Unit(s) SubCutaneous every 7 days  gabapentin 200 milliGRAM(s) Oral daily  heparin  Injectable 5000 Unit(s) SubCutaneous every 8 hours  methadone    Tablet 40 milliGRAM(s) Oral three times a day  sevelamer carbonate 800 milliGRAM(s) Oral three times a day with meals  sodium bicarbonate 650 milliGRAM(s) Oral three times a day    MEDICATIONS  (PRN):  acetaminophen   Tablet .. 650 milliGRAM(s) Oral once PRN Moderate Pain (4 - 6)  ALPRAZolam 0.5 milliGRAM(s) Oral at bedtime PRN insomnia      ALLERGIES:  Allergies    sulfamethizole (Unknown)    Intolerances        LABS:                        7.8    9.29  )-----------( 285      ( 10 Jul 2019 06:51 )             25.6     07-10    140  |  107  |  32<H>  ----------------------------<  77  3.4<L>   |  22  |  3.8<H>    Ca    6.9<L>      10 Jul 2019 06:51    TPro  4.9<L>  /  Alb  1.3<L>  /  TBili  <0.2  /  DBili  x   /  AST  17  /  ALT  11  /  AlkPhos  142<H>  07-10        CAPILLARY BLOOD GLUCOSE      POCT Blood Glucose.: 159 mg/dL (09 Jul 2019 20:30)      RADIOLOGY & ADDITIONAL TESTS: Reviewed.

## 2019-07-10 NOTE — PROGRESS NOTE ADULT - SUBJECTIVE AND OBJECTIVE BOX
Nephrology progress note    Patient is seen and examined, events over the last 24 h noted .    Allergies:  sulfamethizole (Unknown)    Hospital Medications:   MEDICATIONS  (STANDING):  cefTRIAXone   IVPB 1000 milliGRAM(s) IV Intermittent every 24 hours  chlorhexidine 4% Liquid 1 Application(s) Topical <User Schedule>  Dakins Solution - 1/2 Strength 1 Application(s) Topical two times a day  dextrose 40% Gel 15 Gram(s) Oral once  dextrose 5% + sodium chloride 0.45%. 1000 milliLiter(s) (75 mL/Hr) IV Continuous <Continuous>  epoetin kecia Injectable 5000 Unit(s) SubCutaneous every 7 days  gabapentin 200 milliGRAM(s) Oral daily  heparin  Injectable 5000 Unit(s) SubCutaneous every 8 hours  methadone    Tablet 40 milliGRAM(s) Oral three times a day  sevelamer carbonate 800 milliGRAM(s) Oral three times a day with meals  sodium bicarbonate 650 milliGRAM(s) Oral three times a day        VITALS:  T(F): 96.2 (07-10-19 @ 05:30), Max: 97.3 (07-09-19 @ 13:14)  HR: 85 (07-10-19 @ 05:30)  BP: 167/79 (07-10-19 @ 05:30)  RR: 18 (07-10-19 @ 05:30)  SpO2: 100% (07-09-19 @ 19:35)  Wt(kg): --    07-08 @ 07:01  -  07-09 @ 07:00  --------------------------------------------------------  IN: 480 mL / OUT: 1300 mL / NET: -820 mL    07-09 @ 07:01  -  07-10 @ 07:00  --------------------------------------------------------  IN: 0 mL / OUT: 2600 mL / NET: -2600 mL          PHYSICAL EXAM:  Constitutional: NAD  HEENT: anicteric sclera, oropharynx clear, MMM  Neck: No JVD  Respiratory: CTAB, no wheezes, rales or rhonchi  Cardiovascular: S1, S2, RRR  Gastrointestinal: BS+, soft, NT/ND  Extremities: No cyanosis or clubbing. No peripheral edema  :  No brown.   Skin: No rashes    LABS:  07-10    140  |  107  |  32<H>  ----------------------------<  77  3.4<L>   |  22  |  3.8<H>    Ca    6.9<L>      10 Jul 2019 06:51    TPro  4.9<L>  /  Alb  1.3<L>  /  TBili  <0.2  /  DBili      /  AST  17  /  ALT  11  /  AlkPhos  142<H>  07-10                          7.8    9.29  )-----------( 285      ( 10 Jul 2019 06:51 )             25.6       Urine Studies:    Protein/Creatinine Ratio Calculation: 12.8 Ratio (07-05 @ 23:30)  Creatinine, Random Urine: 16 mg/dL (07-05 @ 23:30)    RADIOLOGY & ADDITIONAL STUDIES: Nephrology progress note    Patient is seen and examined, events over the last 24 h noted .  sleeping in bed lethargic     Allergies:  sulfamethizole (Unknown)    Hospital Medications:   MEDICATIONS  (STANDING):  cefTRIAXone   IVPB 1000 milliGRAM(s) IV Intermittent every 24 hours  Dakins Solution - 1/2 Strength 1 Application(s) Topical two times a day  dextrose 40% Gel 15 Gram(s) Oral once  dextrose 5% + sodium chloride 0.45%. 1000 milliLiter(s) (75 mL/Hr) IV Continuous <Continuous>  epoetin kecia Injectable 5000 Unit(s) SubCutaneous every 7 days  gabapentin 200 milliGRAM(s) Oral daily  heparin  Injectable 5000 Unit(s) SubCutaneous every 8 hours  methadone    Tablet 40 milliGRAM(s) Oral three times a day  sevelamer carbonate 800 milliGRAM(s) Oral three times a day with meals  sodium bicarbonate 650 milliGRAM(s) Oral three times a day      VITALS:  T(F): 96.2 (07-10-19 @ 05:30), Max: 97.3 (07-09-19 @ 13:14)  HR: 85 (07-10-19 @ 05:30)  BP: 167/79 (07-10-19 @ 05:30)  RR: 18 (07-10-19 @ 05:30)  SpO2: 100% (07-09-19 @ 19:35)      07-08 @ 07:01  -  07-09 @ 07:00  --------------------------------------------------------  IN: 480 mL / OUT: 1300 mL / NET: -820 mL    07-09 @ 07:01  -  07-10 @ 07:00  --------------------------------------------------------  IN: 0 mL / OUT: 2600 mL / NET: -2600 mL          PHYSICAL EXAM:  Constitutional: lethargic sleepy  HEENT: anicteric sclera, oropharynx clear, MMM  Neck: No JVD  Respiratory: CTAB, no wheezes, rales or rhonchi  Cardiovascular: S1, S2, RRR  Gastrointestinal: BS+, soft, NT/ND positive SPC   Extremities: No cyanosis or clubbing. No peripheral edema  :  No brown.   Skin: No rashes    LABS:  07-10    140  |  107  |  32<H>  ----------------------------<  77  3.4<L>   |  22  |  3.8<H>    Ca    6.9<L>      10 Jul 2019 06:51    TPro  4.9<L>  /  Alb  1.3<L>  /  TBili  <0.2  /  DBili      /  AST  17  /  ALT  11  /  AlkPhos  142<H>  07-10                          7.8    9.29  )-----------( 285      ( 10 Jul 2019 06:51 )             25.6       Urine Studies:    Protein/Creatinine Ratio Calculation: 12.8 Ratio (07-05 @ 23:30)  Creatinine, Random Urine: 16 mg/dL (07-05 @ 23:30)    RADIOLOGY & ADDITIONAL STUDIES:

## 2019-07-10 NOTE — PROGRESS NOTE ADULT - ASSESSMENT
58 years old male with reported PMH of HTN, chronic back pain, chronic anemia, L1 burst fracture with paraplegia since 1986 (bedbound and wheelchair bound), PAD s/p recent BKA on 6/4, neurogenic bladder with suprapubic catheter presented with weakness and generalized pain.    Patient [id number] days in the hospital. Patient seen and examined. Patient is looking better and responsive. MR brain has been done and reported, conveyed to patient and Endo. Endocrinology rec stopping of steroids since no evidence of adrenal insufficiency was found.        # Acute on chronic normocytic anemia  - Hgb 6 on admission, baseline 8-9, Hb 7.8 today  -Procrit 5000 units subcut/week started    # Oliguric SILVINA on CKD (now stage 4) developed in past 6 months   w/ Hyperchloremic Metabolic Acidosis   (Cr 4.9, baseline 1.1 in   8/ 2018; 3.5 mg% on June 18,2019)  nephrology rec:  no indication for RRT at this point. -FeNA>1% CPK - 20  - continuing po NA bicarb 650 tid  - Stable Cr  - Kidney US on 6/4 shows no hydronephrosis, bilateral echogenic kidney compatible with medical renal disease  - monitor K level     #Pressure ulcers  On sacrum, Rt and Lt buttock  followed by burn    #Foot ulcer  Podiatry consulted  Wound assessed and will be followed  Cleaned with water and soap and apply Allevyn Daily   Offloading to L Heel    #Dysuria  improved after irrigation of SPC  patient reported that he has been changed SPC several days prior to admission at home and has been going up and down sizes whenever it leaked  UA/UCx sent, r/o CAUTI, negative  irrigate 100cc sterile water q shift    #Persistent Hypoglycemia ( asymptomatic)    r/o adrenal insufficiency.   No evidence of any pituitary mass  holding steroid, Monitor FS daily.      # HTN  - Well- controlled  - Monitor off medication for now    # DM2  - FS running low; also poor oral intake  - HbA1C  5.5    # Chronic back pain with functional paraplegia  -Pain management on board  -slowly deescalating methadone and gabapentin dosages    # Anxiety  - deescalating benzos      DVT ppx: Heparin subQ  GI ppx: Not indicated  Diet: Renal restricted  Activity: increase as tolerated  Code status: full code    #Progress Note Handoff  Pending (specify): methadone taper  Family discussion: D/W the mother.  Disposition: SNF_

## 2019-07-10 NOTE — PROGRESS NOTE ADULT - ASSESSMENT
58 years old male with reported PMH of HTN, chronic back pain, chronic anemia, L1 burst fracture with paraplegia since 1986 (bedbound and wheelchair bound), PAD s/p recent BKA on 6/4, neurogenic bladder with suprapubic catheter presented with weakness and generalized pain.    Patient [id number] days in the hospital. Patient seen and examined. Patient is looking better and responsive. MR brain has been done and reported, conveyed to patient and Endo. Endocrinology rec stopping of steroids since no evidence of adrenal insufficiency was found.      #Pain Med de-escalation  Going to be attempted today to enable patient for snf transfer    # Acute on chronic normocytic anemia  - Hgb 6 on admission, baseline 8-9, Hb 7.8 today  -Procrit 5000 units subcut/week started    # Oliguric SILVINA on CKD (now stage 4) developed in past 6 months   w/ Hyperchloremic Metabolic Acidosis   (Cr 4.9, baseline 1.1 in   8/ 2018; 3.5 mg% on June 18,2019)  nephrology rec:  no indication for RRT at this point. -FeNA>1% CPK - 20  - continuing po NA bicarb 650 tid  - Stable Cr  - Kidney US on 6/4 shows no hydronephrosis, bilateral echogenic kidney compatible with medical renal disease  - monitor K level     #Pressure ulcers  On sacrum, Rt and Lt buttock  followed by burn    #Foot ulcer  Podiatry consulted  Wound assessed and will be followed  Cleaned with water and soap and apply Allevyn Daily   Offloading to L Heel    #Dysuria  improved after irrigation of SPC  patient reported that he has been changed SPC several days prior to admission at home and has been going up and down sizes whenever it leaked  UA/UCx sent, r/o CAUTI, negative  irrigate 100cc sterile water q shift    #Persistent Hypoglycemia ( asymptomatic)    r/o adrenal insufficiency.   No evidence of any pituitary mass    # HTN  - Well- controlled  - Monitor off medication for now    # DM2  - FS running low; also poor oral intake  - HbA1C  5.5    # Chronic back pain with functional paraplegia  -Pain management on board  -slowly deescalating methadone and gabapentin dosages    # Anxiety  - deescalating benzos      DVT ppx: Heparin subQ  GI ppx: Not indicated  Diet: Renal restricted  Activity: increase as tolerated  Code status: full code

## 2019-07-10 NOTE — PROGRESS NOTE ADULT - ASSESSMENT
58 M w/ PMH of Paraplegia 2/2 Lumbar compression Fx, sacral decubiti and heel ulcers s/p debridements,, PAD, suprapubic cath, Neuropathy, DM2 & HTN , Rt gsngrene, sp BKA admitted with s/p fall found to be in SILVINA and have hyperKalemia.    #creatinine better non oliguric / cont IVF   # patient with significant decrease in GFR in view of BKA  # at the beginning of this admission as per patient mother he would not have wanted RRT   # ? benefit of kidney biopsy  #  c3,c4,  hepatitis profile, Kappa / Lambda within normal limit  unremarkable. f/u JOSE D, DNA, ANCA   # 12.8 grams proteinuria  # continue sodium bicarbonate po  # phos elevated start son sevelamer now   # appreciate endo note off fludrocortisone and off dexamethasone now   # h/h noted, no Venofer elevated sat, on  procrit 5000 units s/c weekly transfuse if Hb <7  # will follow

## 2019-07-10 NOTE — PROGRESS NOTE ADULT - SUBJECTIVE AND OBJECTIVE BOX
MARGE BELLA  58y  Male      Patient is a 58y old  Male who presents with a chief complaint of Acute kidney injury, anemia (10 Jul 2019 09:49)      INTERVAL HPI/OVERNIGHT EVENTS:      ******************************* REVIEW OF SYSTEMS:**********************************************    Resting in bed , no distress.  All other review of systems negative    *********************** VITALS ******************************************    T(F): 96.2 (07-10-19 @ 05:30)  HR: 85 (07-10-19 @ 05:30) (77 - 85)  BP: 167/79 (07-10-19 @ 05:30) (158/76 - 181/86)  RR: 18 (07-10-19 @ 05:30) (18 - 18)  SpO2: 100% (07-09-19 @ 19:35) (100% - 100%)    07-09-19 @ 07:01  -  07-10-19 @ 07:00  --------------------------------------------------------  IN: 0 mL / OUT: 2600 mL / NET: -2600 mL            07-09-19 @ 07:01  -  07-10-19 @ 07:00  --------------------------------------------------------  IN: 0 mL / OUT: 2600 mL / NET: -2600 mL        ******************************** PHYSICAL EXAM:**************************************************  GENERAL: NAD    PSYCH: no agitation, baseline mentation  HEENT:     NERVOUS SYSTEM:  Alert & Oriented X3,   PULMONARY: DESIRAE, CTA    CARDIOVASCULAR: S1S2 RRR    GI: Soft, NT, ND; BS present.    EXTREMITIES:  right BKA, with LLE edema    LYMPH: No lymphadenopathy noted    SKIN:  sacral decubiti    ******************************************************************************************    Consultant(s) Notes Reviewed:  [x ] YES  [ ] NO    Discussed with Consultants/Other Providers [ x] YES     **************************** LABS *******************************************************                          7.8    9.29  )-----------( 285      ( 10 Jul 2019 06:51 )             25.6     07-10    140  |  107  |  32<H>  ----------------------------<  77  3.4<L>   |  22  |  3.8<H>    Ca    6.9<L>      10 Jul 2019 06:51    TPro  4.9<L>  /  Alb  1.3<L>  /  TBili  <0.2  /  DBili  x   /  AST  17  /  ALT  11  /  AlkPhos  142<H>  07-10          Lactate Trend        CAPILLARY BLOOD GLUCOSE      POCT Blood Glucose.: 159 mg/dL (09 Jul 2019 20:30)          **************************Active Medications *******************************************  sulfamethizole (Unknown)      acetaminophen   Tablet .. 650 milliGRAM(s) Oral once PRN  ALPRAZolam 0.5 milliGRAM(s) Oral at bedtime PRN  cefTRIAXone   IVPB 1000 milliGRAM(s) IV Intermittent every 24 hours  chlorhexidine 4% Liquid 1 Application(s) Topical <User Schedule>  Dakins Solution - 1/2 Strength 1 Application(s) Topical two times a day  dextrose 40% Gel 15 Gram(s) Oral once  dextrose 5% + sodium chloride 0.45%. 1000 milliLiter(s) IV Continuous <Continuous>  epoetin kecia Injectable 5000 Unit(s) SubCutaneous every 7 days  gabapentin 200 milliGRAM(s) Oral daily  heparin  Injectable 5000 Unit(s) SubCutaneous every 8 hours  methadone    Tablet 40 milliGRAM(s) Oral three times a day  sevelamer carbonate 800 milliGRAM(s) Oral three times a day with meals  sodium bicarbonate 650 milliGRAM(s) Oral three times a day      ***************************************************  RADIOLOGY & ADDITIONAL TESTS:    Imaging Personally Reviewed:  [ ] YES  [ ] NO    HEALTH ISSUES - PROBLEM Dx:  Lumbago without sciatica: Lumbago without sciatica

## 2019-07-11 NOTE — PROGRESS NOTE ADULT - SUBJECTIVE AND OBJECTIVE BOX
58 years old male with reported PMH of HTN, chronic back pain, chronic anemia, L1 burst fracture with paraplegia since 1986 (bedbound and wheelchair bound), PAD s/p recent BKA on 6/4, neurogenic bladder with suprapubic catheter (recently changed 3 days ago) presented with weakness and generalized pain. History was also obtained from patient's mother at bedside. Patient was found to be on the floor this morning by his mother. Patient states that he was trying to transfer himself from bed to his wheelchair but he fell on the floor. Patient denies head trauma or LOC. His mother later found him and called the ambulance. Patient was recently admitted to [facility] for RLE gangrene and had BKA done on 6/4. Patient was discharged on 6/21 to SNF initially but patient refused, so he was discharged home instead. Patient has fallen at home 3 times since his discharge. Patient reports weakness, bilateral shoulder pain, and chronic low back pain. Patient denies fever/chills, cough, chest pain, shortness of breath, abdominal pain, nausea/vomiting, diarrhea/constipation. Patient was found to be anemic with hemoglobin of 6 (baseline 8-9) and elevated creatinine of 4.8 (baseline 0.8 in 2018, 3.8 in 2019).    PAST MEDICAL & SURGICAL HISTORY:  Anemia  PAD (peripheral artery disease)  Hypertension  Suprapubic catheter  Pressure ulcer  Diabetes  Lumbar compression fracture  S/P below knee amputation, right  S/P debridement  Toe amputation status, right  H/O hernia repair    OVERNIGHT EVENTS:    SUBJECTIVE / INTERVAL HPI: Patient seen and examined at bedside.     VITAL SIGNS:  Vital Signs Last 24 Hrs  T(C): 36 (11 Jul 2019 13:35), Max: 36.3 (11 Jul 2019 05:30)  T(F): 96.8 (11 Jul 2019 13:35), Max: 97.4 (11 Jul 2019 05:30)  HR: 77 (11 Jul 2019 13:35) (77 - 83)  BP: 157/76 (11 Jul 2019 13:35) (134/70 - 157/76)  BP(mean): --  RR: 16 (11 Jul 2019 13:35) (16 - 18)  SpO2: --    PHYSICAL EXAM:    General: WDWN  HEENT: NC/AT; PERRL, anicteric sclera; MMM  Neck: supple  Cardiovascular: +S1/S2, RRR  Respiratory: CTA B/L; no W/R/R  Gastrointestinal: soft, NT/ND; +BSx4  Extremities: WWP; no edema, clubbing or cyanosis  Vascular: 2+ radial, DP/PT pulses B/L  Neurological: AAOx3; no focal deficits    MEDICATIONS:  MEDICATIONS  (STANDING):  cefTRIAXone   IVPB 1000 milliGRAM(s) IV Intermittent every 24 hours  chlorhexidine 4% Liquid 1 Application(s) Topical <User Schedule>  Dakins Solution - 1/2 Strength 1 Application(s) Topical two times a day  dextrose 40% Gel 15 Gram(s) Oral once  dextrose 5% + sodium chloride 0.45%. 1000 milliLiter(s) (75 mL/Hr) IV Continuous <Continuous>  epoetin kecia Injectable 5000 Unit(s) SubCutaneous every 7 days  gabapentin 200 milliGRAM(s) Oral daily  heparin  Injectable 5000 Unit(s) SubCutaneous every 8 hours  methadone    Tablet 40 milliGRAM(s) Oral two times a day  sevelamer carbonate 800 milliGRAM(s) Oral three times a day with meals  sodium bicarbonate 650 milliGRAM(s) Oral three times a day    MEDICATIONS  (PRN):  acetaminophen   Tablet .. 650 milliGRAM(s) Oral once PRN Moderate Pain (4 - 6)      ALLERGIES:  Allergies    sulfamethizole (Unknown)    Intolerances        LABS:                        7.9    8.19  )-----------( 308      ( 11 Jul 2019 05:28 )             25.3     07-11    140  |  107  |  30<H>  ----------------------------<  75  3.7   |  23  |  3.8<H>    Ca    6.9<L>      11 Jul 2019 05:28    TPro  4.9<L>  /  Alb  1.3<L>  /  TBili  <0.2  /  DBili  x   /  AST  17  /  ALT  11  /  AlkPhos  142<H>  07-10        CAPILLARY BLOOD GLUCOSE      POCT Blood Glucose.: 159 mg/dL (09 Jul 2019 20:30)      RADIOLOGY & ADDITIONAL TESTS: Reviewed. Statement Selected

## 2019-07-11 NOTE — PROGRESS NOTE ADULT - SUBJECTIVE AND OBJECTIVE BOX
MARGE BELLA  58y  Male      Patient is a 58y old  Male who presents with a chief complaint of Acute kidney injury, anemia (11 Jul 2019 07:03)      INTERVAL HPI/OVERNIGHT EVENTS:      ******************************* REVIEW OF SYSTEMS:**********************************************  Resting comfortably in bed. Doing dumb bells !  All other review of systems negative    *********************** VITALS ******************************************    T(F): 97.4 (07-11-19 @ 05:30)  HR: 83 (07-11-19 @ 05:30) (75 - 83)  BP: 134/70 (07-11-19 @ 05:30) (134/70 - 175/81)  RR: 18 (07-11-19 @ 05:30) (18 - 18)  SpO2: --    07-10-19 @ 07:01  -  07-11-19 @ 07:00  --------------------------------------------------------  IN: 100 mL / OUT: 4650 mL / NET: -4550 mL            07-10-19 @ 07:01  -  07-11-19 @ 07:00  --------------------------------------------------------  IN: 100 mL / OUT: 4650 mL / NET: -4550 mL        ******************************** PHYSICAL EXAM:**************************************************  GENERAL: NAD    PSYCH: no agitation, baseline mentation  HEENT:     NERVOUS SYSTEM:  Alert & Oriented X3,  PULMONARY: DESIRAE, CTA    CARDIOVASCULAR: S1S2 RRR    GI: Soft, NT, ND; BS present.    EXTREMITIES:  RLE BKA with staples ,     LYMPH: No lymphadenopathy noted    SKIN: No rashes or lesions    ******************************************************************************************    Consultant(s) Notes Reviewed:  [x ] YES  [ ] NO    Discussed with Consultants/Other Providers [ x] YES     **************************** LABS *******************************************************                          7.9    8.19  )-----------( 308      ( 11 Jul 2019 05:28 )             25.3     07-11    140  |  107  |  30<H>  ----------------------------<  75  3.7   |  23  |  3.8<H>    Ca    6.9<L>      11 Jul 2019 05:28    TPro  4.9<L>  /  Alb  1.3<L>  /  TBili  <0.2  /  DBili  x   /  AST  17  /  ALT  11  /  AlkPhos  142<H>  07-10          Lactate Trend        CAPILLARY BLOOD GLUCOSE      POCT Blood Glucose.: 159 mg/dL (09 Jul 2019 20:30)          **************************Active Medications *******************************************  sulfamethizole (Unknown)      acetaminophen   Tablet .. 650 milliGRAM(s) Oral once PRN  cefTRIAXone   IVPB 1000 milliGRAM(s) IV Intermittent every 24 hours  chlorhexidine 4% Liquid 1 Application(s) Topical <User Schedule>  Dakins Solution - 1/2 Strength 1 Application(s) Topical two times a day  dextrose 40% Gel 15 Gram(s) Oral once  dextrose 5% + sodium chloride 0.45%. 1000 milliLiter(s) IV Continuous <Continuous>  epoetin kecia Injectable 5000 Unit(s) SubCutaneous every 7 days  gabapentin 200 milliGRAM(s) Oral daily  heparin  Injectable 5000 Unit(s) SubCutaneous every 8 hours  methadone    Tablet 40 milliGRAM(s) Oral two times a day  sevelamer carbonate 800 milliGRAM(s) Oral three times a day with meals  sodium bicarbonate 650 milliGRAM(s) Oral three times a day      ***************************************************  RADIOLOGY & ADDITIONAL TESTS:    Imaging Personally Reviewed:  [ ] YES  [ ] NO    HEALTH ISSUES - PROBLEM Dx:  Lumbago without sciatica: Lumbago without sciatica

## 2019-07-11 NOTE — PROGRESS NOTE ADULT - SUBJECTIVE AND OBJECTIVE BOX
seen and examined  no distress  lying comfortable       Standing Inpatient Medications  cefTRIAXone   IVPB 1000 milliGRAM(s) IV Intermittent every 24 hours  chlorhexidine 4% Liquid 1 Application(s) Topical <User Schedule>  Dakins Solution - 1/2 Strength 1 Application(s) Topical two times a day  dextrose 40% Gel 15 Gram(s) Oral once  dextrose 5% + sodium chloride 0.45%. 1000 milliLiter(s) IV Continuous <Continuous>  epoetin kecia Injectable 5000 Unit(s) SubCutaneous every 7 days  gabapentin 200 milliGRAM(s) Oral daily  heparin  Injectable 5000 Unit(s) SubCutaneous every 8 hours  methadone    Tablet 40 milliGRAM(s) Oral two times a day  sevelamer carbonate 800 milliGRAM(s) Oral three times a day with meals  sodium bicarbonate 650 milliGRAM(s) Oral three times a day        VITALS/PHYSICAL EXAM  --------------------------------------------------------------------------------  T(C): 35.6 (07-10-19 @ 13:06), Max: 35.6 (07-10-19 @ 13:06)  HR: 75 (07-10-19 @ 13:06) (75 - 75)  BP: 175/81 (07-10-19 @ 13:06) (175/81 - 175/81)  RR: 18 (07-10-19 @ 13:06) (18 - 18)  SpO2: --  Wt(kg): --        07-10-19 @ 07:01  -  07-11-19 @ 07:00  --------------------------------------------------------  IN: 100 mL / OUT: 4650 mL / NET: -4550 mL      Physical Exam:  	Gen: NAD  	Pulm: decrease BS B/L  	CV:  S1S2; no rub  	Abd: +distended  	: spc  	LE: bka      LABS/STUDIES  --------------------------------------------------------------------------------              7.8    9.29  >-----------<  285      [07-10-19 @ 06:51]              25.6     140  |  107  |  32  ----------------------------<  77      [07-10-19 @ 06:51]  3.4   |  22  |  3.8        Ca     6.9     [07-10-19 @ 06:51]    TPro  4.9  /  Alb  1.3  /  TBili  <0.2  /  DBili  x   /  AST  17  /  ALT  11  /  AlkPhos  142  [07-10-19 @ 06:51]              [07-09-19 @ 07:20]    Creatinine Trend:  SCr 3.8 [07-10 @ 06:51]  SCr 4.1 [07-09 @ 05:15]  SCr 4.0 [07-08 @ 05:09]  SCr 4.4 [07-06 @ 07:13]  SCr 4.5 [07-05 @ 06:14]    Urinalysis - [07-10-19 @ 15:10]      Color  / Appearance  / SG  / pH 6.6      Gluc  / Ketone   / Bili  / Urobili        Blood  / Protein  / Leuk Est  / Nitrite       RBC  / WBC  / Hyaline  / Gran  / Sq Epi  / Non Sq Epi  / Bacteria     Urine Creatinine 16      [07-05-19 @ 23:30]  Urine Protein 205      [07-05-19 @ 23:30]  Urine Sodium 83.0      [07-10-19 @ 14:40]  Urine Potassium 12      [07-10-19 @ 14:40]  Urine Chloride 73      [07-10-19 @ 14:40]  Urine Phosphorus 16      [07-10-19 @ 14:40]  Urine Osmolality 222      [07-10-19 @ 15:10]    Iron 37, TIBC 84, %sat 44      [07-05-19 @ 11:00]  PTH -- (Ca 7.8)      [07-02-19 @ 10:54]   71  HbA1c 5.5      [06-27-19 @ 06:35]    HBsAg Nonreact      [07-06-19 @ 07:13]  HCV 0.13, Nonreact      [07-06-19 @ 07:13]    JOSE D: titer Negative, pattern --      [07-06-19 @ 07:13]  C3 Complement 104      [07-06-19 @ 07:13]  C4 Complement 24      [07-06-19 @ 07:13]  ANCA: cANCA Negative, pANCA Negative, atypical ANCA Negative      [07-06-19 @ 07:13]  Immunofixation Serum:   Polyclonal Expansion with a Weak IgG Kappa Band Identified    Reference Range: None Detected      [07-06-19 @ 07:13]  Immunofixation Urine: Reference Range: None Detected      [07-05-19 @ 23:30]

## 2019-07-11 NOTE — PROGRESS NOTE ADULT - ASSESSMENT
58 M w/ PMH of Paraplegia 2/2 Lumbar compression Fx, sacral decubiti and heel ulcers s/p debridements,, PAD, suprapubic cath, Neuropathy, DM2 & HTN , Rt gsngrene, sp BKA admitted with s/p fall found to be in SILVINA and have hyperKalemia.    #creatinine improving  # non oliguric   # patient with significant decrease in GFR in view of BKA  # ? benefit of kidney biopsy  #  w/up noted,  check K/L ratio  # 12.8 grams proteinuria, please send 24 h urine collection for cr, volume, protein   # d/c sodium bicarbonate   # corrected calcium 8.6, d/c renagel and start phoslo 1 tablet po q 8   # h/h noted, no Venofer elevated sat, on  procrit 5000 units s/c weekly transfuse if Hb <7, increase procrit to 7000 units s/c weekly   # if BP remains elevated, start norvasc 5 q 24   # will follow

## 2019-07-11 NOTE — PROGRESS NOTE ADULT - ASSESSMENT
58 years old male with reported PMH of HTN, chronic back pain, chronic anemia, L1 burst fracture with paraplegia since 1986 (bedbound and wheelchair bound), PAD s/p recent BKA on 6/4, neurogenic bladder with suprapubic catheter presented with weakness and generalized pain.    Today it is his 15 day in the hospital. Patient seen and examined. Patient is looking better and responsive. He is tolerating methadone taper well. He is anticipated to be discharged tomorrow. Vascular has been contacted to remove the patient's staples after BKA 6/4.     #Pain Med de-escalation  handling well    # Acute on chronic normocytic anemia  - Hgb 6 on admission, baseline 8-9, Hb 7.9 today  -Procrit 5000 units subcut/week started    # Oliguric SILVINA on CKD (now stage 4) developed in past 6 months   w/ Hyperchloremic Metabolic Acidosis   (Cr 4.9, baseline 1.1 in   8/ 2018; 3.5 mg% on June 18,2019)  nephrology rec:  no indication for RRT at this point. -FeNA>1% CPK - 20  - continuing po NA bicarb 650 tid  - Stable Cr  - Kidney US on 6/4 shows no hydronephrosis, bilateral echogenic kidney compatible with medical renal disease  - monitor K level     #Pressure ulcers  On sacrum, Rt and Lt buttock  followed by burn    #Foot ulcer  Podiatry consulted  Wound assessed and will be followed    #Persistent Hypoglycemia ( asymptomatic)    r/o adrenal insufficiency.   No evidence of any pituitary mass    # HTN  - Well- controlled  - Monitor off medication for now    # DM2  - FS running low; also poor oral intake  - HbA1C  5.5    # Chronic back pain with functional paraplegia  -Pain management on board  -slowly deescalating methadone and gabapentin dosages    # Anxiety  - deescalating benzos      DVT ppx: Heparin subQ  GI ppx: Not indicated  Diet: Renal restricted  Activity: increase as tolerated  Code status: full code

## 2019-07-11 NOTE — PROGRESS NOTE ADULT - ASSESSMENT
58 years old male with reported PMH of HTN, chronic back pain, chronic anemia, L1 burst fracture with paraplegia since 1986 (bedbound and wheelchair bound), PAD s/p recent BKA on 6/4, neurogenic bladder with suprapubic catheter presented with weakness and generalized pain.    Patient [id number] days in the hospital. Patient seen and examined. Patient is looking better and responsive. MR brain has been done and reported, conveyed to patient and Endo. Endocrinology rec stopping of steroids since no evidence of adrenal insufficiency was found.        # Acute on chronic normocytic anemia  - Hgb 6 on admission, baseline 8-9, Hb 7.8 today  -Procrit 5000 units subcut/week started    # Oliguric SILVINA on CKD (now stage 4) developed in past 6 months   w/ Hyperchloremic Metabolic Acidosis   (Cr  baseline 1.1 in   8/ 2018; 3.5  on June 18,2019)  nephrology rec:  no indication for RRT at this point. -FeNA>1% CPK - 20  - continuing po NA bicarb 650 tid  - Stable Cr, trending down.\ >>3.8 today.    - Kidney US on 6/4 shows no hydronephrosis, bilateral echogenic kidney compatible with medical renal disease  - monitor K level     #Pressure ulcers  On sacrum, Rt and Lt buttock  followed by burn    #Foot ulcer  Podiatry consulted  Wound assessed and will be followed  Cleaned with water and soap and apply Allevyn Daily   Offloading to L Heel    #Dysuria  improved after irrigation of SPC  patient reported that he has been changed SPC several days prior to admission at home and has been going up and down sizes whenever it leaked  UA/UCx sent, r/o CAUTI, negative  irrigate 100cc sterile water q shift  Would stop abx.     #Persistent Hypoglycemia ( asymptomatic)    Unlikely  adrenal insufficiency.   No evidence of any pituitary mass  holding steroid, Monitor FS daily.      # HTN  - Well- controlled  - Monitor off medication for now    # DM2 ? Highly doubt with normal A1c  - HbA1C  5.5    # Chronic back pain with functional paraplegia  -Pain management on board  -slowly deescalating methadone and gabapentin dosages    # Anxiety  - deescalating benzos      DVT ppx: Heparin subQ  GI ppx: Not indicated  Diet: Renal restricted  Activity: increase as tolerated  Code status: full code    #Progress Note Handoff  Pending (specify): methadone taper  Family discussion: D/W the mother.  Disposition: SNF_

## 2019-07-12 NOTE — PROGRESS NOTE ADULT - ASSESSMENT
58 years old male with reported PMH of HTN, chronic back pain, chronic anemia, L1 burst fracture with paraplegia since 1986 (bedbound and wheelchair bound), PAD s/p recent BKA on 6/4, neurogenic bladder with suprapubic catheter presented with weakness and generalized pain.    Today it is his 16 day in the hospital. Patient seen and examined. He is tolerating methadone taper well. He is anticipated to be discharged tomorrow. Vascular has been contacted to remove the patient's staples after BKA 6/4 which they did. Waiting for the nephrology follow up for possible discharge to SNF     #Pain Med de-escalation  handling well    # Acute on chronic normocytic anemia  - Hgb 6 on admission, baseline 8-9, -Procrit 5000 units subcut/week started    # Oliguric SILVINA on CKD (now stage 4) developed in past 6 months   w/ Hyperchloremic Metabolic Acidosis   (Cr 4.9, baseline 1.1 in   8/ 2018; 3.5 mg% on June 18,2019)  nephrology rec:  no indication for RRT at this point. -FeNA>1% CPK - 20  - continuing po NA bicarb 650 tid  - Stable Cr  - Kidney US on 6/4 shows no hydronephrosis, bilateral echogenic kidney compatible with medical renal disease  - monitor K level     #Pressure ulcers  On sacrum, Rt and Lt buttock  followed by burn    #Foot ulcer  Podiatry consulted  Wound assessed and will be followed    #Persistent Hypoglycemia ( asymptomatic)    r/o adrenal insufficiency.   No evidence of any pituitary mass    # HTN  - Well- controlled  - Monitor off medication for now    # DM2  - FS running low; also poor oral intake  - HbA1C  5.5    # Chronic back pain with functional paraplegia  -Pain management on board  -slowly deescalating methadone and gabapentin dosages    # Anxiety  - deescalating benzos      DVT ppx: Heparin subQ  GI ppx: Not indicated  Diet: Renal restricted  Activity: increase as tolerated  Code status: full code

## 2019-07-12 NOTE — PROGRESS NOTE ADULT - SUBJECTIVE AND OBJECTIVE BOX
58 years old male with reported PMH of HTN, chronic back pain, chronic anemia, L1 burst fracture with paraplegia since 1986 (bedbound and wheelchair bound), PAD s/p recent BKA on 6/4, neurogenic bladder with suprapubic catheter (recently changed 3 days ago) presented with weakness and generalized pain. History was also obtained from patient's mother at bedside. Patient was found to be on the floor this morning by his mother. Patient states that he was trying to transfer himself from bed to his wheelchair but he fell on the floor. Patient denies head trauma or LOC. His mother later found him and called the ambulance. Patient was recently admitted to [facility] for RLE gangrene and had BKA done on 6/4. Patient was discharged on 6/21 to SNF initially but patient refused, so he was discharged home instead. Patient has fallen at home 3 times since his discharge. Patient reports weakness, bilateral shoulder pain, and chronic low back pain. Patient denies fever/chills, cough, chest pain, shortness of breath, abdominal pain, nausea/vomiting, diarrhea/constipation. Patient was found to be anemic with hemoglobin of 6 (baseline 8-9) and elevated creatinine of 4.8 (baseline 0.8 in 2018, 3.8 in 2019).    PAST MEDICAL & SURGICAL HISTORY:  Anemia  PAD (peripheral artery disease)  Hypertension  Suprapubic catheter  Pressure ulcer  Diabetes  Lumbar compression fracture  S/P below knee amputation, right  S/P debridement  Toe amputation status, right  H/O hernia repair    OVERNIGHT EVENTS: NS    OVERNIGHT EVENTS:    SUBJECTIVE / INTERVAL HPI: Patient seen and examined at bedside.     VITAL SIGNS:  Vital Signs Last 24 Hrs  T(C): 36.7 (12 Jul 2019 14:19), Max: 36.7 (12 Jul 2019 14:19)  T(F): 98.1 (12 Jul 2019 14:19), Max: 98.1 (12 Jul 2019 14:19)  HR: 81 (12 Jul 2019 14:19) (77 - 83)  BP: 162/72 (12 Jul 2019 14:19) (128/78 - 163/74)  BP(mean): --  RR: 18 (12 Jul 2019 14:19) (18 - 18)  SpO2: --    PHYSICAL EXAM:    General: WDWN  HEENT: NC/AT; PERRL, anicteric sclera; MMM  Neck: supple  Cardiovascular: +S1/S2, RRR  Respiratory: CTA B/L; no W/R/R  Gastrointestinal: soft, NT/ND; +BSx4  Extremities: WWP; no edema, clubbing or cyanosis  Vascular: 2+ radial, DP/PT pulses B/L  Neurological: AAOx3; no focal deficits    MEDICATIONS:  MEDICATIONS  (STANDING):  amLODIPine   Tablet 5 milliGRAM(s) Oral every 24 hours  chlorhexidine 4% Liquid 1 Application(s) Topical <User Schedule>  Dakins Solution - 1/2 Strength 1 Application(s) Topical two times a day  dextrose 40% Gel 15 Gram(s) Oral once  dextrose 5% + sodium chloride 0.45%. 1000 milliLiter(s) (75 mL/Hr) IV Continuous <Continuous>  epoetin kecia Injectable 5000 Unit(s) SubCutaneous every 7 days  gabapentin 200 milliGRAM(s) Oral daily  heparin  Injectable 5000 Unit(s) SubCutaneous every 8 hours  methadone    Tablet 40 milliGRAM(s) Oral two times a day  sevelamer carbonate 800 milliGRAM(s) Oral three times a day with meals  sodium bicarbonate 650 milliGRAM(s) Oral three times a day    MEDICATIONS  (PRN):      ALLERGIES:  Allergies    sulfamethizole (Unknown)    Intolerances        LABS:                        7.9    8.19  )-----------( 308      ( 11 Jul 2019 05:28 )             25.3     07-11    140  |  107  |  30<H>  ----------------------------<  75  3.7   |  23  |  3.8<H>    Ca    6.9<L>      11 Jul 2019 05:28          CAPILLARY BLOOD GLUCOSE          RADIOLOGY & ADDITIONAL TESTS: Reviewed.

## 2019-07-12 NOTE — CHART NOTE - NSCHARTNOTEFT_GEN_A_CORE
Chart Note  Patient: MARGE BELLA 58y (1960) Male   MRN: [id number]  Location: [address]  Visit: 06-26-19 Inpatient  Date: 07-12-19 @ 16:22    Procedure: S/P R BKA on 6/4/19    Subjective:   Nausea: no, Vomiting: no, Ambulating: no (chronic) Flatus: yes  Pain Assessment: slight tenderness    Objective:  Vitals: T(F): 98.1 (07-12-19 @ 14:19), Max: 98.1 (07-12-19 @ 14:19)  HR: 81 (07-12-19 @ 14:19)  BP: 162/72 (07-12-19 @ 14:19) (128/78 - 163/74)  RR: 18 (07-12-19 @ 14:19)  SpO2: --  Vent Settings:     In:   07-11-19 @ 07:01  -  07-12-19 @ 07:00  --------------------------------------------------------  IN: 100 mL      IV Fluids: dextrose 5% + sodium chloride 0.45%. 1000 milliLiter(s) (75 mL/Hr) IV Continuous <Continuous>  sodium bicarbonate 650 milliGRAM(s) Oral three times a day      Out:   07-11-19 @ 07:01  -  07-12-19 @ 07:00  --------------------------------------------------------  OUT: 675 mL        07-11-19 @ 07:01  -  07-12-19 @ 07:00  --------------------------------------------------------  OUT: 675 mL      Posadas Catheter:  no         Physical Examination:  General Appearance: NAD  HEENT: EOMI, sclera non-icteric.  Heart: RRR  Lungs: CTABL  Abdomen:  Soft, nontender, nondistended. No rigidity, guarding, or rebound tenderness.   MSK/Extremities: Warm & well-perfused. Peripheral pulses intact.  Skin: Warm, dry. No jaundice. Decubitus ulcer on poster sacrum  Incisions/Wounds: clean and, no signs of infection/active bleeding/drainage    Medications: [Standing]  amLODIPine   Tablet 5 milliGRAM(s) Oral every 24 hours  chlorhexidine 4% Liquid 1 Application(s) Topical <User Schedule>  Dakins Solution - 1/2 Strength 1 Application(s) Topical two times a day  dextrose 40% Gel 15 Gram(s) Oral once  dextrose 5% + sodium chloride 0.45%. 1000 milliLiter(s) IV Continuous <Continuous>  epoetin kecia Injectable 5000 Unit(s) SubCutaneous every 7 days  gabapentin 200 milliGRAM(s) Oral daily  heparin  Injectable 5000 Unit(s) SubCutaneous every 8 hours  methadone    Tablet 40 milliGRAM(s) Oral two times a day  sevelamer carbonate 800 milliGRAM(s) Oral three times a day with meals  sodium bicarbonate 650 milliGRAM(s) Oral three times a dayf  Medications: [PRN]  amLODIPine   Tablet 5 milliGRAM(s) Oral every 24 hours  chlorhexidine 4% Liquid 1 Application(s) Topical <User Schedule>  Dakins Solution - 1/2 Strength 1 Application(s) Topical two times a day  dextrose 40% Gel 15 Gram(s) Oral once  dextrose 5% + sodium chloride 0.45%. 1000 milliLiter(s) IV Continuous <Continuous>  epoetin kecia Injectable 5000 Unit(s) SubCutaneous every 7 days  gabapentin 200 milliGRAM(s) Oral daily  heparin  Injectable 5000 Unit(s) SubCutaneous every 8 hours  methadone    Tablet 40 milliGRAM(s) Oral two times a day  sevelamer carbonate 800 milliGRAM(s) Oral three times a day with meals  sodium bicarbonate 650 milliGRAM(s) Oral three times a day    Labs:                        7.9    8.19  )-----------( 308      ( 11 Jul 2019 05:28 )             25.3     07-11    140  |  107  |  30<H>  ----------------------------<  75  3.7   |  23  |  3.8<H>    Ca    6.9<L>      11 Jul 2019 05:28              Assessment:  58yMale patient S/P R BKA on 6/4/19. I removed the staples, placed benzoin solution, and layered steri strips. I instructed the patient not to submerge in water and allow the steri strips to fall off on their own.         Date/Time: 07-12-19 @ 16:22

## 2019-07-12 NOTE — PROGRESS NOTE ADULT - ASSESSMENT
58 years old male with reported PMH of HTN, chronic back pain, chronic anemia, L1 burst fracture with paraplegia since 1986 (bedbound and wheelchair bound), PAD s/p recent BKA on 6/4, neurogenic bladder with suprapubic catheter presented with weakness and generalized pain.    Patient [id number] days in the hospital. Patient seen and examined. Patient is looking better and responsive. MR brain has been done and reported, conveyed to patient and Endo. Endocrinology rec stopping of steroids since no evidence of adrenal insufficiency was found.        # Acute on chronic normocytic anemia  - Hgb 6 on admission, baseline 8-9, Hb 7.8 today  -Procrit 5000 units subcut/week started    # Oliguric SILVINA on CKD (now stage 4) developed in past 6 months   w/ Hyperchloremic Metabolic Acidosis   (Cr  baseline 1.1 in   8/ 2018; 3.5  on June 18,2019)  nephrology rec:  no indication for RRT at this point. -FeNA>1% CPK - 20  - continuing po NA bicarb 650 tid  - Stable Cr, trending down.\ >>3.8 today.    - Kidney US on 6/4 shows no hydronephrosis, bilateral echogenic kidney compatible with medical renal disease  - monitor K level     #Pressure ulcers  On sacrum, Rt and Lt buttock  followed by burn    #Foot ulcer  Podiatry consulted  Wound assessed and will be followed  Cleaned with water and soap and apply Allevyn Daily   Offloading to L Heel    #Dysuria  improved after irrigation of SPC  patient reported that he has been changed SPC several days prior to admission at home and has been going up and down sizes whenever it leaked  UA/UCx sent, r/o CAUTI, negative  irrigate 100cc sterile water q shift  Off abx.     #Persistent Hypoglycemia ( asymptomatic)    Unlikely  adrenal insufficiency.   No evidence of any pituitary mass  holding steroid, Monitor FS daily.      # HTN  - Well- controlled  - Monitor off medication for now    # DM2 ? Highly doubt with normal A1c  - HbA1C  5.5    # Chronic back pain with functional paraplegia  -Pain management on board  -slowly deescalating methadone and gabapentin dosages    # Anxiety  - deescalating benzos      DVT ppx: Heparin subQ  GI ppx: Not indicated  Diet: Renal restricted  Activity: increase as tolerated  Code status: full code    #Progress Note Handoff  Pending (specify) : Placement.   Family discussion: D/W the mother.  Disposition: SNF_

## 2019-07-12 NOTE — DIETITIAN INITIAL EVALUATION ADULT. - FEEDING SKILL
Pt is currently on a clear liquid diet and was tolerating well. He said that a doctor told him yesterday that he could advance to solids, so he got food from the cafeteria downstairs. Pt had just finished pancakes when I had seen him. Pt is waiting for diet advancement./independent Yes

## 2019-07-12 NOTE — CHART NOTE - NSCHARTNOTEFT_GEN_A_CORE
Registered Dietitian Follow-Up     Patient Profile Reviewed                           Yes [x]   No []     Nutrition History Previously Obtained        Yes [x]  No []       Pertinent Subjective Information: Pt continues with improved PO intake- consumed ~75% of meal tray at breakfast. Report snacking throughout the day. Continues to refuse Nepro- pt likely to be d/c today or tomorrow and will resume his home PO protein-kcal supplements. Pt not reordered marinol, however, no longer will rec as pt to be d/c. Covering resident already aware of rec.     Pertinent Medical Interventions: Oliguric SILVINA on CKD (now stage IV) developed in past 6 months, no indication for RRT at this point. Pressure injuries on sacrum, R and L buttocks. Foot ulcer noted. Persistent hypoglycemia- asymptomatic, r/o adrenal insufficiency.  Diet order: Renal restrictions, high fiber     Anthropometrics:  - Ht. 187.96cm  - Wt. 85.5kg (7/11) vs 81.4kg (7/7) vs 81.5 kg (7/5) vs 80.3 kg (7/1) relatively stable, will monitor weight changes   - %wt change  - BMI 22.6 adjusted for R BKA,  - IBW: 84kg+/-10%     Pertinent Lab Data: (7/9) H/H 7.9/25.3, BUN 30, Cr 3.8  POCT gluc noted     Pertinent Meds: heparin, d5w NS @75ml/h, norvasc, renvella, sodium bicarbonate,       Physical Findings:  - Appearance: AAO  - GI function: LBM 7/11, denies GI s/s at present  - Tubes:  - Oral/Mouth cavity: does not report any changes  - Skin: stage 4 PU to buttocks, stage 3 PU sacrum and R buttocks      Nutrition Requirements  Weight Used: needs cont from RD assessment 6/28      Estimated Energy Needs    Continue [x]  Adjust [] 1993 - 2159 kcals/day (MSJ x 1.2 - 1.3 AF for PU)  Adjusted Energy Recommendations:   kcal/day        Estimated Protein Needs    Continue [x]  Adjust [] 68 - 84  gms/day (0.9 - 1.1 gm/kg for PU and renal fxn) will adjust prn   Adjusted Protein Recommendations:   gm/day        Estimated Fluid Needs        Continue [x]  Adjust [] 1ml/kcal or per LIP  Adjusted Fluid Recommendations:   mL/day     Nutrient Intake: likely not meeting needs, however, likely meeting >75% of needs. Pt would benefit from PO supplement, however, refusing.        [] Previous Nutrition Diagnosis: Increased Nutrient Needs + inadequate oral intake            [x] Ongoing          [] Resolved    [] No active nutrition diagnosis identified at this time     Nutrition Intervention meal/snack, nutrition-related medication management     Goal/Expected Outcome: Pt to consume >75% of meal tray in the next 7 days      Indicator/Monitoring: RD to monitor energy intke, diet order. body comp, NFPF, renal/glucose profile     Recommendation: Continue renal restricted diet + high fiber. Pt would benefit from PO supplement, however, pt consistently refusing. No further diet interventions. Noted pt to be d/c today or tomorrow. Registered Dietitian Follow-Up     Patient Profile Reviewed                           Yes [x]   No []     Nutrition History Previously Obtained        Yes [x]  No []       Pertinent Subjective Information: Pt continues with improved PO intake- consumed ~75% of meal tray at breakfast. Report snacking throughout the day. Continues to refuse Nepro- pt likely to be d/c today or tomorrow and will resume his home PO protein-kcal supplements. Pt not reordered marinol, however, no longer will rec as pt to be d/c. Covering resident already aware of rec.     Pertinent Medical Interventions: Oliguric SILVINA on CKD (now stage IV) developed in past 6 months, no indication for RRT at this point. Pressure injuries on sacrum, R and L buttocks. Foot ulcer noted. Persistent hypoglycemia- asymptomatic, r/o adrenal insufficiency.  Diet order: Renal restrictions, high fiber     Anthropometrics:  - Ht. 187.96cm  - Wt. 85.5kg (7/11) vs 81.4kg (7/7) vs 81.5 kg (7/5) vs 80.3 kg (7/1) relatively stable, will monitor weight changes   - %wt change  - BMI 22.6 adjusted for R BKA,  - IBW: 84kg+/-10%     Pertinent Lab Data: (7/11) H/H 7.9/25.3, BUN 30, Cr 3.8  POCT gluc noted     Pertinent Meds: heparin, d5w NS @75ml/h, norvasc, renvella, sodium bicarbonate,       Physical Findings:  - Appearance: AAO  - GI function: LBM 7/11, denies GI s/s at present  - Tubes:  - Oral/Mouth cavity: does not report any changes  - Skin: stage 4 PU to buttocks, stage 3 PU sacrum and R buttocks      Nutrition Requirements  Weight Used: needs cont from RD assessment 6/28      Estimated Energy Needs    Continue [x]  Adjust [] 1993 - 2159 kcals/day (MSJ x 1.2 - 1.3 AF for PU)  Adjusted Energy Recommendations:   kcal/day        Estimated Protein Needs    Continue [x]  Adjust [] 68 - 84  gms/day (0.9 - 1.1 gm/kg for PU and renal fxn) will adjust prn   Adjusted Protein Recommendations:   gm/day        Estimated Fluid Needs        Continue [x]  Adjust [] 1ml/kcal or per LIP  Adjusted Fluid Recommendations:   mL/day     Nutrient Intake: likely not meeting needs, however, likely meeting >75% of needs. Pt would benefit from PO supplement, however, refusing.        [] Previous Nutrition Diagnosis: Increased Nutrient Needs + inadequate oral intake            [x] Ongoing          [] Resolved    [] No active nutrition diagnosis identified at this time     Nutrition Intervention meal/snack, nutrition-related medication management     Goal/Expected Outcome: Pt to consume >75% of meal tray in the next 7 days      Indicator/Monitoring: RD to monitor energy intke, diet order. body comp, NFPF, renal/glucose profile     Recommendation: Continue renal restricted diet + high fiber. Pt would benefit from PO supplement, however, pt consistently refusing. No further diet interventions. Noted pt to be d/c today or tomorrow.

## 2019-07-12 NOTE — PROGRESS NOTE ADULT - ASSESSMENT
58 M w/ PMH of Paraplegia 2/2 Lumbar compression Fx, sacral decubiti and heel ulcers s/p debridements,, PAD, suprapubic cath, Neuropathy, DM2 & HTN , Rt gsngrene, sp BKA admitted with s/p fall found to be in SILVINA and have hyperKalemia.    #creatinine improving  # non oliguric on IVF for now / continue   # patient with significant decrease in GFR in view of BKA  # ? benefit of kidney biopsy  #  w/up noted,  k/l within normal /IF neg   # 12.8 grams proteinuria, please send 24 h urine collection for cr, volume, protein   # d/c sodium bicarbonate please   # corrected calcium 8.6, d/c renagel and start phoslo 1 tablet po q 8   # h/h noted, no Venofer elevated sat, on  procrit 5000 units s/c weekly transfuse if Hb <7, increase procrit to 7000 units s/c weekly   # started on Norvasc follow BP   # will follow

## 2019-07-12 NOTE — PROGRESS NOTE ADULT - SUBJECTIVE AND OBJECTIVE BOX
MARGE BELLA  58y  Male      Patient is a 58y old  Male who presents with a chief complaint of Acute kidney injury, anemia (12 Jul 2019 09:07)      INTERVAL HPI/OVERNIGHT EVENTS:      ******************************* REVIEW OF SYSTEMS:**********************************************  remains stable.   All other review of systems negative    *********************** VITALS ******************************************    T(F): 97.3 (07-12-19 @ 05:29)  HR: 82 (07-12-19 @ 11:16) (77 - 83)  BP: 128/78 (07-12-19 @ 11:16) (128/78 - 163/74)  RR: 18 (07-12-19 @ 05:29) (16 - 18)  SpO2: --    07-11-19 @ 07:01  -  07-12-19 @ 07:00  --------------------------------------------------------  IN: 100 mL / OUT: 675 mL / NET: -575 mL            07-11-19 @ 07:01  -  07-12-19 @ 07:00  --------------------------------------------------------  IN: 100 mL / OUT: 675 mL / NET: -575 mL        ******************************** PHYSICAL EXAM:**************************************************  GENERAL: NAD    PSYCH: no agitation, baseline mentation  HEENT:     NERVOUS SYSTEM:  Alert & Oriented X3,    PULMONARY: DESIRAE, CTA    CARDIOVASCULAR: S1S2 RRR    GI: Soft, NT, ND; BS present.    EXTREMITIES:  right BKA with staples, dry.     LYMPH: No lymphadenopathy noted    SKIN: sacral decubiti    ******************************************************************************************    Consultant(s) Notes Reviewed:  [x ] YES  [ ] NO    Discussed with Consultants/Other Providers [ x] YES     **************************** LABS *******************************************************                          7.9    8.19  )-----------( 308      ( 11 Jul 2019 05:28 )             25.3     07-11    140  |  107  |  30<H>  ----------------------------<  75  3.7   |  23  |  3.8<H>    Ca    6.9<L>      11 Jul 2019 05:28            Lactate Trend        CAPILLARY BLOOD GLUCOSE              **************************Active Medications *******************************************  sulfamethizole (Unknown)      acetaminophen   Tablet .. 650 milliGRAM(s) Oral once PRN  amLODIPine   Tablet 5 milliGRAM(s) Oral every 24 hours  chlorhexidine 4% Liquid 1 Application(s) Topical <User Schedule>  Dakins Solution - 1/2 Strength 1 Application(s) Topical two times a day  dextrose 40% Gel 15 Gram(s) Oral once  dextrose 5% + sodium chloride 0.45%. 1000 milliLiter(s) IV Continuous <Continuous>  epoetin kecia Injectable 5000 Unit(s) SubCutaneous every 7 days  gabapentin 200 milliGRAM(s) Oral daily  heparin  Injectable 5000 Unit(s) SubCutaneous every 8 hours  methadone    Tablet 40 milliGRAM(s) Oral two times a day  sevelamer carbonate 800 milliGRAM(s) Oral three times a day with meals  sodium bicarbonate 650 milliGRAM(s) Oral three times a day      ***************************************************  RADIOLOGY & ADDITIONAL TESTS:    Imaging Personally Reviewed:  [ ] YES  [ ] NO    HEALTH ISSUES - PROBLEM Dx:  Lumbago without sciatica: Lumbago without sciatica

## 2019-07-13 NOTE — PROGRESS NOTE ADULT - SUBJECTIVE AND OBJECTIVE BOX
MARGE BELLA  58y  Male      Patient is a 58y old  Male who presents with a chief complaint of Acute kidney injury, anemia (13 Jul 2019 07:15)      INTERVAL HPI/OVERNIGHT EVENTS:      ******************************* REVIEW OF SYSTEMS:**********************************************  Resting in bed.   All other review of systems negative    *********************** VITALS ******************************************    T(F): 97.7 (07-13-19 @ 05:17)  HR: 79 (07-13-19 @ 05:17) (79 - 88)  BP: 130/33 (07-13-19 @ 05:17) (130/33 - 162/72)  RR: 16 (07-13-19 @ 05:17) (16 - 18)  SpO2: 98% (07-13-19 @ 08:41) (98% - 100%)    07-12-19 @ 07:01  -  07-13-19 @ 07:00  --------------------------------------------------------  IN: 0 mL / OUT: 725 mL / NET: -725 mL            07-12-19 @ 07:01  -  07-13-19 @ 07:00  --------------------------------------------------------  IN: 0 mL / OUT: 725 mL / NET: -725 mL        ******************************** PHYSICAL EXAM:**************************************************  GENERAL: NAD    PSYCH: no agitation, baseline mentation  HEENT:     NERVOUS SYSTEM:  Alert & Oriented X3,   PULMONARY: DESIRAE, CTA    CARDIOVASCULAR: S1S2 RRR    GI: Soft, NT, ND; BS present.    EXTREMITIES:  right BKA, off staples now.     LYMPH: No lymphadenopathy noted    SKIN: sacral; decubiti    ******************************************************************************************    Consultant(s) Notes Reviewed:  [x ] YES  [ ] NO    Discussed with Consultants/Other Providers [ x] YES     **************************** LABS *******************************************************                          7.3    7.68  )-----------( 276      ( 13 Jul 2019 05:16 )             23.3     07-13    141  |  109  |  30<H>  ----------------------------<  83  3.1<L>   |  21  |  3.4<H>    Ca    7.1<L>      13 Jul 2019 05:16            Lactate Trend        CAPILLARY BLOOD GLUCOSE      POCT Blood Glucose.: 71 mg/dL (13 Jul 2019 07:57)          **************************Active Medications *******************************************  sulfamethizole (Unknown)      amLODIPine   Tablet 5 milliGRAM(s) Oral every 24 hours  chlorhexidine 4% Liquid 1 Application(s) Topical <User Schedule>  Dakins Solution - 1/2 Strength 1 Application(s) Topical two times a day  dextrose 40% Gel 15 Gram(s) Oral once  dextrose 5% + sodium chloride 0.45%. 1000 milliLiter(s) IV Continuous <Continuous>  epoetin kecia Injectable 5000 Unit(s) SubCutaneous every 7 days  gabapentin 200 milliGRAM(s) Oral daily  heparin  Injectable 5000 Unit(s) SubCutaneous every 8 hours  methadone    Tablet 40 milliGRAM(s) Oral two times a day  sevelamer carbonate 800 milliGRAM(s) Oral three times a day with meals  sodium bicarbonate 650 milliGRAM(s) Oral three times a day      ***************************************************  RADIOLOGY & ADDITIONAL TESTS:    Imaging Personally Reviewed:  [ ] YES  [ ] NO    HEALTH ISSUES - PROBLEM Dx:  Lumbago without sciatica: Lumbago without sciatica

## 2019-07-13 NOTE — PHARMACOTHERAPY INTERVENTION NOTE - COMMENTS
s/w md delgado. discussed dose and freq. confirmed pt was on this on last visit last week as well as this was per pain management consult on last inpatient visit also last week
Spoke with MD: was accidentally ordered SQ, changed to IV
s/w md mosley, pt had epogen 5000 once but hg still low- 7.3. confirmed increase in order to 7000. spectra [id number]

## 2019-07-13 NOTE — PROGRESS NOTE ADULT - SUBJECTIVE AND OBJECTIVE BOX
seen and examined  lying comfortable  no distress      Standing Inpatient Medications  amLODIPine   Tablet 5 milliGRAM(s) Oral every 24 hours  chlorhexidine 4% Liquid 1 Application(s) Topical <User Schedule>  Dakins Solution - 1/2 Strength 1 Application(s) Topical two times a day  dextrose 40% Gel 15 Gram(s) Oral once  dextrose 5% + sodium chloride 0.45%. 1000 milliLiter(s) IV Continuous <Continuous>  epoetin kecia Injectable 5000 Unit(s) SubCutaneous every 7 days  gabapentin 200 milliGRAM(s) Oral daily  heparin  Injectable 5000 Unit(s) SubCutaneous every 8 hours  methadone    Tablet 40 milliGRAM(s) Oral two times a day  sevelamer carbonate 800 milliGRAM(s) Oral three times a day with meals  sodium bicarbonate 650 milliGRAM(s) Oral three times a day      VITALS/PHYSICAL EXAM  --------------------------------------------------------------------------------  T(C): 36.5 (07-13-19 @ 05:17), Max: 36.7 (07-12-19 @ 14:19)  HR: 79 (07-13-19 @ 05:17) (79 - 88)  BP: 130/33 (07-13-19 @ 05:17) (128/78 - 162/72)  RR: 16 (07-13-19 @ 05:17) (16 - 18)  SpO2: 100% (07-12-19 @ 19:18) (100% - 100%)  Wt(kg): --        07-12-19 @ 07:01  -  07-13-19 @ 07:00  --------------------------------------------------------  IN: 0 mL / OUT: 725 mL / NET: -725 mL      Physical Exam:  	Gen: NAD  	Pulm: CTA B/L  	CV:  S1S2; no rub  	Abd: +distended  	: SPC  	LE: bka   	  LABS/STUDIES  --------------------------------------------------------------------------------              7.3    7.68  >-----------<  276      [07-13-19 @ 05:16]              23.3         Creatinine Trend:  SCr 3.8 [07-11 @ 05:28]  SCr 3.8 [07-10 @ 06:51]  SCr 4.1 [07-09 @ 05:15]  SCr 4.0 [07-08 @ 05:09]  SCr 4.4 [07-06 @ 07:13]    Urinalysis - [07-10-19 @ 15:10]      Color  / Appearance  / SG  / pH 6.6      Gluc  / Ketone   / Bili  / Urobili        Blood  / Protein  / Leuk Est  / Nitrite       RBC  / WBC  / Hyaline  / Gran  / Sq Epi  / Non Sq Epi  / Bacteria     Urine Creatinine 19      [07-12-19 @ 06:00]  Urine Protein 289      [07-12-19 @ 06:00]  Urine Sodium 83.0      [07-10-19 @ 14:40]  Urine Potassium 12      [07-10-19 @ 14:40]  Urine Chloride 73      [07-10-19 @ 14:40]  Urine Phosphorus 16      [07-10-19 @ 14:40]  Urine Osmolality 222      [07-10-19 @ 15:10]    Iron 37, TIBC 84, %sat 44      [07-05-19 @ 11:00]  PTH -- (Ca 7.8)      [07-02-19 @ 10:54]   71  HbA1c 5.5      [06-27-19 @ 06:35]    HBsAg Nonreact      [07-06-19 @ 07:13]  HCV 0.13, Nonreact      [07-06-19 @ 07:13]    JOSE D: titer Negative, pattern --      [07-06-19 @ 07:13]  C3 Complement 104      [07-06-19 @ 07:13]  C4 Complement 24      [07-06-19 @ 07:13]  ANCA: cANCA Negative, pANCA Negative, atypical ANCA Negative      [07-06-19 @ 07:13]  Immunofixation Serum:   Polyclonal Expansion with a Weak IgG Kappa Band Identified    Reference Range: None Detected      [07-06-19 @ 07:13]  Immunofixation Urine: Reference Range: None Detected      [07-05-19 @ 23:30]

## 2019-07-13 NOTE — PROGRESS NOTE ADULT - ASSESSMENT
58 M w/ PMH of Paraplegia 2/2 Lumbar compression Fx, sacral decubiti and heel ulcers s/p debridements,, PAD, suprapubic cath, Neuropathy, DM2 & HTN , Rt gsngrene, sp BKA admitted with s/p fall found to be in SILVINA and have hyperKalemia.    #creatinine improving, check repeat today   # non oliguric   # d/c iv fluids   # patient with significant decrease in GFR in view of BKA  # ? benefit of kidney biopsy  # 12.8 grams proteinuria, please send 24 h urine collection for cr, volume, protein   # start phoslo 1 tablet po q 8 and d/c renagel  # h/h noted, no Venofer elevated sat, on  procrit 5000 units s/c weekly transfuse if Hb <7, increase procrit to 7000 units s/c weekly   # started on Norvasc , BP better controlled this morning  # no acute indication for RRT  # will follow

## 2019-07-13 NOTE — PROGRESS NOTE ADULT - ASSESSMENT
58 years old male with reported PMH of HTN, chronic back pain, chronic anemia, L1 burst fracture with paraplegia since 1986 (bedbound and wheelchair bound), PAD s/p recent BKA on 6/4, neurogenic bladder with suprapubic catheter presented with weakness and generalized pain.    Patient [id number] days in the hospital. Patient seen and examined. Patient is looking better and responsive. MR brain has been done and reported, conveyed to patient and Endo. Endocrinology rec stopping of steroids since no evidence of adrenal insufficiency was found.        # Acute on chronic normocytic anemia  - Hgb 6 on admission, baseline 8-9, Hb 7.8 >>7.3 today  -Procrit 5000 units subcut/week  >> will increase to 7000 units sc /week  monitor cbc , transfuse if hb <7    # Oliguric SILVINA on CKD (now stage 4) developed in past 6 months   w/ Hyperchloremic Metabolic Acidosis   (Cr  baseline 1.1 in   8/ 2018; 3.5  on June 18,2019)  - continuing po NA bicarb 650 tid  - Stable Cr, trending down.\ >>3.8 >>3.4 today.    - Kidney US on 6/4 shows no hydronephrosis, bilateral echogenic kidney compatible with medical renal disease  - will switch renagel to phoslo.     #Pressure ulcers  On sacrum, Rt and Lt buttock  followed by burn    #Foot ulcer  Podiatry consulted  Wound assessed and will be followed  Cleaned with water and soap and apply Allevyn Daily   Offloading to L Heel    #Dysuria  improved after irrigation of SPC  patient reported that he has been changed SPC several days prior to admission at home and has been going up and down sizes whenever it leaked  UA/UCx sent, r/o CAUTI, negative  irrigate 100cc sterile water q shift  Off abx.     #Persistent Hypoglycemia ( asymptomatic)    Unlikely  adrenal insufficiency.   No evidence of any pituitary mass  holding steroid, Monitor FS daily.      # HTN  - Well- controlled  - Monitor off medication for now    # DM2 ? Highly doubt with normal A1c  - HbA1C  5.5    # Chronic back pain with functional paraplegia  -Pain management on board  -slowly deescalating methadone and gabapentin dosages    # Anxiety  - deescalating benzos      DVT ppx: Heparin subQ  GI ppx: Not indicated  Diet: Renal restricted  Activity: increase as tolerated  Code status: full code    #Progress Note Handoff  Pending (specify) : Placement.   Family discussion: D/W the mother.  Disposition: SNF_

## 2019-07-14 NOTE — PROGRESS NOTE ADULT - ASSESSMENT
58 years old male with reported PMH of HTN, chronic back pain, chronic anemia, L1 burst fracture with paraplegia since 1986 (bedbound and wheelchair bound), PAD s/p recent BKA on 6/4, neurogenic bladder with suprapubic catheter presented with weakness and generalized pain.    Patient [id number] days in the hospital. Patient seen and examined. Patient is looking better and responsive. MR brain has been done and reported, conveyed to patient and Endo. Endocrinology rec stopping of steroids since no evidence of adrenal insufficiency was found.        # Acute on chronic normocytic anemia  - Hgb 6 on admission, baseline 8-9, Hb 7.8 >>7.3 today  -Procrit 5000 units subcut/week  >> will increase to 7000 units sc /week  monitor cbc , transfuse if hb <7    # Oliguric SILVINA on CKD (now stage 4) developed in past 6 months   w/ Hyperchloremic Metabolic Acidosis   (Cr  baseline 1.1 in   8/ 2018; 3.5  on June 18,2019)  - continuing po NA bicarb 650 tid  - Stable Cr, trending down.\ >>3.8 >>3.4 today.    - Kidney US on 6/4 shows no hydronephrosis, bilateral echogenic kidney compatible with medical renal disease  - will switch renagel to phoslo.     #Pressure ulcers  On sacrum, Rt and Lt buttock  followed by burn    #Foot ulcer  Podiatry consulted  Wound assessed and will be followed  Cleaned with water and soap and apply Allevyn Daily   Offloading to L Heel    #Dysuria  improved after irrigation of SPC  patient reported that he has been changed SPC several days prior to admission at home and has been going up and down sizes whenever it leaked  UA/UCx sent, r/o CAUTI, negative  irrigate 100cc sterile water q shift  Off abx.     #Persistent Hypoglycemia ( asymptomatic)    Unlikely  adrenal insufficiency.   No evidence of any pituitary mass  holding steroid, Monitor FS daily.      # HTN  - Well- controlled  - Monitor off medication for now    # DM2 ? Highly doubt with normal A1c  - HbA1C  5.5    # Chronic back pain with functional paraplegia  -Pain management on board  - deescalated  methadone to 40 mg bid and gabapentin dosages    # Anxiety  - deescalating benzos      DVT ppx: Heparin subQ  GI ppx: Not indicated  Diet: Renal restricted  Activity: increase as tolerated  Code status: full code    #Progress Note Handoff  Pending (specify) : Placement.   Family discussion: D/W the mother.  Disposition: prefers home now over SNF.

## 2019-07-14 NOTE — PROGRESS NOTE ADULT - ASSESSMENT
58 years old male with reported PMH of HTN, chronic back pain, chronic anemia, L1 burst fracture with paraplegia since 1986 (bedbound and wheelchair bound), PAD s/p recent BKA on 6/4, neurogenic bladder with suprapubic catheter presented with weakness and generalized pain.   Patient seen and examined. He was tolerating methadone taper well but has been complaining of pain today and yesterday. He is anticipated to be discharged tomorrow to home. Nephrology has not indicated any urgent need of RRT      #Hypokalemia  monitor 4 pm labs  replete and monitor if still low    #Pain Med de-escalation  handling poorly for past 2 days  patient not sure about wanting to go to SNF    # Acute on chronic normocytic anemia  - Hgb 6 on admission, baseline 8-9,   -Procrit 7000 units subcut/week started    # Oliguric SILVINA on CKD (now stage 4) developed in past 6 months   w/ Hyperchloremic Metabolic Acidosis   (Cr 4.9, baseline 1.1 in   8/ 2018; 3.5 mg% on June 18,2019)  nephrology rec:  no indication for RRT at this point. -FeNA>1% CPK - 20  - continuing po NA bicarb 650 tid  - Stable Cr  - Kidney US on 6/4 shows no hydronephrosis, bilateral echogenic kidney compatible with medical renal disease  - monitor K level     #Pressure ulcers  On sacrum, Rt and Lt buttock  followed by burn    #Foot ulcer  Podiatry consulted  Wound assessed and will be followed    #Persistent Hypoglycemia ( asymptomatic)    r/o adrenal insufficiency.   No evidence of any pituitary mass    # HTN  - Well- controlled  - Monitor off medication for now    # DM2  - FS running low; also poor oral intake  - HbA1C  5.5    # Chronic back pain with functional paraplegia  -Pain management on board  -slowly deescalating methadone and gabapentin dosages    # Anxiety  - deescalating benzos      DVT ppx: Heparin subQ  GI ppx: Not indicated  Diet: Renal restricted  Activity: increase as tolerated  Code status: full code

## 2019-07-14 NOTE — CONSULT NOTE ADULT - REASON FOR ADMISSION
Acute kidney injury, anemia
Acute kidney injury
Acute kidney injury, anemia

## 2019-07-14 NOTE — PROGRESS NOTE ADULT - ASSESSMENT
patient with bka ac decubitias ulcer with [place] hyperkalemia creatinine 3.5 stable and potassium 3.4 no i indication of rrt

## 2019-07-14 NOTE — PROGRESS NOTE ADULT - SUBJECTIVE AND OBJECTIVE BOX
[facility] FOLLOW UP NOTE  --------------------------------------------------------------------------------  Chief Complaint:    24 hour events/subjective:        PAST HISTORY  --------------------------------------------------------------------------------  No significant changes to PMH, PSH, FHx, SHx, unless otherwise noted    ALLERGIES & MEDICATIONS  --------------------------------------------------------------------------------  Allergies    sulfamethizole (Unknown)    Intolerances      Standing Inpatient Medications  amLODIPine   Tablet 5 milliGRAM(s) Oral every 24 hours  calcium acetate 667 milliGRAM(s) Oral three times a day with meals  chlorhexidine 4% Liquid 1 Application(s) Topical <User Schedule>  Dakins Solution - 1/2 Strength 1 Application(s) Topical two times a day  dextrose 40% Gel 15 Gram(s) Oral once  epoetin kecia Injectable 7000 Unit(s) SubCutaneous every 7 days  gabapentin 200 milliGRAM(s) Oral daily  heparin  Injectable 5000 Unit(s) SubCutaneous every 8 hours  methadone    Tablet 40 milliGRAM(s) Oral two times a day  sodium bicarbonate 650 milliGRAM(s) Oral three times a day    PRN Inpatient Medications  acetaminophen   Tablet .. 650 milliGRAM(s) Oral every 6 hours PRN      REVIEW OF SYSTEMS  --------------------------------------------------------------------------------  Gen: No weight changes, fatigue, fevers/chills, weakness  Skin: No rashes  Head/Eyes/Ears/Mouth: No headache; Normal hearing; Normal vision w/o blurriness; No sinus pain/discomfort, sore throat  Respiratory: No dyspnea, cough, wheezing, hemoptysis  CV: No chest pain, PND, orthopnea  GI: No abdominal pain, diarrhea, constipation, nausea, vomiting, melena, hematochezia  : No increased frequency, dysuria, hematuria, nocturia  MSK: No joint pain/swelling; no back pain; no edema  Neuro: No dizziness/lightheadedness, weakness, seizures, numbness, tingling  Heme: No easy bruising or bleeding  Endo: No heat/cold intolerance  Psych: No significant nervousness, anxiety, stress, depression    All other systems were reviewed and are negative, except as noted.    VITALS/PHYSICAL EXAM  --------------------------------------------------------------------------------  T(C): 36.2 (07-14-19 @ 05:00), Max: 37.3 (07-13-19 @ 13:08)  HR: 62 (07-14-19 @ 05:00) (62 - 80)  BP: 159/70 (07-14-19 @ 05:00) (119/66 - 159/70)  RR: 18 (07-14-19 @ 05:00) (18 - 18)  SpO2: 100% (07-13-19 @ 20:00) (100% - 100%)  Wt(kg): --        07-13-19 @ 07:01  -  07-14-19 @ 07:00  --------------------------------------------------------  IN: 0 mL / OUT: 1850 mL / NET: -1850 mL      Physical Exam:  	Gen: NAD, well-appearing  	HEENT: PERRL, supple neck, clear oropharynx  	Pulm: CTA B/L  	CV: RRR, S1S2; no rub  	Back: No spinal or CVA tenderness; no sacral edema  	Abd: +BS, soft, nontender/nondistended  	: No suprapubic tenderness  	UE: Warm, FROM, no clubbing, intact strength; no edema; no asterixis  	LE: Warm, FROM, no clubbing, intact strength; no edema  	Neuro: No focal deficits, intact gait  	Psych: Normal affect and mood  	Skin: Warm, without rashes  	Vascular access:    LABS/STUDIES  --------------------------------------------------------------------------------              7.3    8.09  >-----------<  292      [07-14-19 @ 00:37]              23.7     141  |  108  |  29  ----------------------------<  103      [07-14-19 @ 00:37]  3.4   |  23  |  3.5        Ca     7.2     [07-14-19 @ 00:37]            Creatinine Trend:  SCr 3.5 [07-14 @ 00:37]  SCr 3.4 [07-13 @ 05:16]  SCr 3.8 [07-11 @ 05:28]  SCr 3.8 [07-10 @ 06:51]  SCr 4.1 [07-09 @ 05:15]    Urinalysis - [07-10-19 @ 15:10]      Color  / Appearance  / SG  / pH 6.6      Gluc  / Ketone   / Bili  / Urobili        Blood  / Protein  / Leuk Est  / Nitrite       RBC  / WBC  / Hyaline  / Gran  / Sq Epi  / Non Sq Epi  / Bacteria     Urine Creatinine 19      [07-12-19 @ 06:00]  Urine Protein 289      [07-12-19 @ 06:00]  Urine Sodium 83.0      [07-10-19 @ 14:40]  Urine Potassium 12      [07-10-19 @ 14:40]  Urine Chloride 73      [07-10-19 @ 14:40]  Urine Phosphorus 16      [07-10-19 @ 14:40]  Urine Osmolality 222      [07-10-19 @ 15:10]    Iron 37, TIBC 84, %sat 44      [07-05-19 @ 11:00]  PTH -- (Ca 7.8)      [07-02-19 @ 10:54]   71  HbA1c 5.5      [06-27-19 @ 06:35]    HBsAg Nonreact      [07-06-19 @ 07:13]  HCV 0.13, Nonreact      [07-06-19 @ 07:13]    JOSE D: titer Negative, pattern --      [07-06-19 @ 07:13]  C3 Complement 104      [07-06-19 @ 07:13]  C4 Complement 24      [07-06-19 @ 07:13]  ANCA: cANCA Negative, pANCA Negative, atypical ANCA Negative      [07-06-19 @ 07:13]  Immunofixation Serum:   Polyclonal Expansion with a Weak IgG Kappa Band Identified    Reference Range: None Detected      [07-06-19 @ 07:13]  Immunofixation Urine: Reference Range: None Detected      [07-05-19 @ 23:30]

## 2019-07-14 NOTE — PROGRESS NOTE ADULT - SUBJECTIVE AND OBJECTIVE BOX
MARGE BELLA  58y  Male      Patient is a 58y old  Male who presents with a chief complaint of Acute kidney injury, anemia (14 Jul 2019 06:54)      INTERVAL HPI/OVERNIGHT EVENTS:      ******************************* REVIEW OF SYSTEMS:**********************************************  Resting in bed, c/o pain .   All other review of systems negative    *********************** VITALS ******************************************    T(F): 97.1 (07-14-19 @ 05:00)  HR: 62 (07-14-19 @ 05:00) (62 - 83)  BP: 159/70 (07-14-19 @ 05:00) (119/66 - 159/70)  RR: 18 (07-14-19 @ 05:00) (18 - 18)  SpO2: 100% (07-13-19 @ 20:00) (100% - 100%)    07-13-19 @ 07:01  -  07-14-19 @ 07:00  --------------------------------------------------------  IN: 0 mL / OUT: 1850 mL / NET: -1850 mL            07-13-19 @ 07:01  -  07-14-19 @ 07:00  --------------------------------------------------------  IN: 0 mL / OUT: 1850 mL / NET: -1850 mL        ******************************** PHYSICAL EXAM:**************************************************  GENERAL: NAD    PSYCH: no agitation, baseline mentation  HEENT:     NERVOUS SYSTEM:  Alert & Oriented X3,     PULMONARY: DESIRAE, CTA    CARDIOVASCULAR: S1S2 RRR    GI: Soft, NT, ND; BS present.    EXTREMITIES:  right BKA off staples     LYMPH: No lymphadenopathy noted    SKIN: No rashes or lesions    ******************************************************************************************    Consultant(s) Notes Reviewed:  [x ] YES  [ ] NO    Discussed with Consultants/Other Providers [ x] YES     **************************** LABS *******************************************************                          7.3    8.09  )-----------( 292      ( 14 Jul 2019 00:37 )             23.7     07-14    141  |  108  |  29<H>  ----------------------------<  103<H>  3.4<L>   |  23  |  3.5<H>    Ca    7.2<L>      14 Jul 2019 00:37            Lactate Trend        CAPILLARY BLOOD GLUCOSE      POCT Blood Glucose.: 83 mg/dL (14 Jul 2019 07:19)          **************************Active Medications *******************************************  sulfamethizole (Unknown)      acetaminophen   Tablet .. 650 milliGRAM(s) Oral every 6 hours PRN  amLODIPine   Tablet 5 milliGRAM(s) Oral every 24 hours  calcium acetate 667 milliGRAM(s) Oral three times a day with meals  chlorhexidine 4% Liquid 1 Application(s) Topical <User Schedule>  Dakins Solution - 1/2 Strength 1 Application(s) Topical two times a day  dextrose 40% Gel 15 Gram(s) Oral once  epoetin kecia Injectable 7000 Unit(s) SubCutaneous every 7 days  gabapentin 200 milliGRAM(s) Oral daily  heparin  Injectable 5000 Unit(s) SubCutaneous every 8 hours  methadone    Tablet 40 milliGRAM(s) Oral two times a day  sodium bicarbonate 650 milliGRAM(s) Oral three times a day      ***************************************************  RADIOLOGY & ADDITIONAL TESTS:    Imaging Personally Reviewed:  [ ] YES  [ ] NO    HEALTH ISSUES - PROBLEM Dx:  Lumbago without sciatica: Lumbago without sciatica

## 2019-07-14 NOTE — PROGRESS NOTE ADULT - SUBJECTIVE AND OBJECTIVE BOX
58 years old male with reported PMH of HTN, chronic back pain, chronic anemia, L1 burst fracture with paraplegia since 1986 (bedbound and wheelchair bound), PAD s/p recent BKA on 6/4, neurogenic bladder with suprapubic catheter (recently changed 3 days ago) presented with weakness and generalized pain. History was also obtained from patient's mother at bedside. Patient was found to be on the floor this morning by his mother. Patient states that he was trying to transfer himself from bed to his wheelchair but he fell on the floor. Patient denies head trauma or LOC. His mother later found him and called the ambulance. Patient was recently admitted to [facility] for RLE gangrene and had BKA done on 6/4. Patient was discharged on 6/21 to SNF initially but patient refused, so he was discharged home instead. Patient has fallen at home 3 times since his discharge. Patient reports weakness, bilateral shoulder pain, and chronic low back pain. Patient denies fever/chills, cough, chest pain, shortness of breath, abdominal pain, nausea/vomiting, diarrhea/constipation. Patient was found to be anemic with hemoglobin of 6 (baseline 8-9) and elevated creatinine of 4.8 (baseline 0.8 in 2018, 3.8 in 2019).    PAST MEDICAL & SURGICAL HISTORY:  Anemia  PAD (peripheral artery disease)  Hypertension  Suprapubic catheter  Pressure ulcer  Diabetes  Lumbar compression fracture  S/P below knee amputation, right  S/P debridement  Toe amputation status, right  H/O hernia repair  OVERNIGHT EVENTS:    SUBJECTIVE / INTERVAL HPI: Patient seen and examined at bedside.     VITAL SIGNS:  Vital Signs Last 24 Hrs  T(C): 36.2 (14 Jul 2019 05:00), Max: 36.3 (13 Jul 2019 20:54)  T(F): 97.1 (14 Jul 2019 05:00), Max: 97.4 (13 Jul 2019 20:54)  HR: 62 (14 Jul 2019 05:00) (62 - 77)  BP: 159/70 (14 Jul 2019 05:00) (138/67 - 159/70)  BP(mean): --  RR: 18 (14 Jul 2019 05:00) (18 - 18)  SpO2: 100% (13 Jul 2019 20:00) (100% - 100%)    PHYSICAL EXAM:    General: WDWN  HEENT: NC/AT; PERRL, anicteric sclera; MMM  Neck: supple  Cardiovascular: +S1/S2, RRR  Respiratory: CTA B/L; no W/R/R  Gastrointestinal: soft, NT/ND; +BSx4  Extremities: WWP; no edema, clubbing or cyanosis  Vascular: 2+ radial, DP/PT pulses B/L  Neurological: AAOx3; no focal deficits    MEDICATIONS:  MEDICATIONS  (STANDING):  amLODIPine   Tablet 5 milliGRAM(s) Oral every 24 hours  calcium acetate 667 milliGRAM(s) Oral three times a day with meals  chlorhexidine 4% Liquid 1 Application(s) Topical <User Schedule>  Dakins Solution - 1/2 Strength 1 Application(s) Topical two times a day  dextrose 40% Gel 15 Gram(s) Oral once  epoetin kecia Injectable 7000 Unit(s) SubCutaneous every 7 days  gabapentin 200 milliGRAM(s) Oral daily  heparin  Injectable 5000 Unit(s) SubCutaneous every 8 hours  methadone    Tablet 40 milliGRAM(s) Oral two times a day  sodium bicarbonate 650 milliGRAM(s) Oral three times a day    MEDICATIONS  (PRN):  acetaminophen   Tablet .. 650 milliGRAM(s) Oral every 6 hours PRN Mild Pain (1 - 3)      ALLERGIES:  Allergies    sulfamethizole (Unknown)    Intolerances        LABS:                        7.3    8.09  )-----------( 292      ( 14 Jul 2019 00:37 )             23.7     07-14    141  |  108  |  29<H>  ----------------------------<  103<H>  3.4<L>   |  23  |  3.5<H>    Ca    7.2<L>      14 Jul 2019 00:37          CAPILLARY BLOOD GLUCOSE      POCT Blood Glucose.: 69 mg/dL (14 Jul 2019 11:38)      RADIOLOGY & ADDITIONAL TESTS: Reviewed.

## 2019-07-14 NOTE — CONSULT NOTE ADULT - SUBJECTIVE AND OBJECTIVE BOX
Called by housestaff that pt states "he felt a pop and his SP tube fell out". Pt with hx of paralysuis and SP tube x 20 yrs. Upon examination the SP tube was laying on bed and balloon ruptured. Under sterile field placed a new 22 RF SP tube w/o difficulty. Pt tolerated procedure well.

## 2019-07-15 NOTE — PROGRESS NOTE ADULT - ASSESSMENT
58 years old male with reported PMH of HTN, chronic back pain, chronic anemia, L1 burst fracture with paraplegia since 1986 (bedbound and wheelchair bound), PAD s/p recent BKA on 6/4, neurogenic bladder with suprapubic catheter presented with weakness and generalized pain.    Patient [id number] days in the hospital. Patient seen and examined. Patient is looking better and responsive. MR brain has been done and reported, conveyed to patient and Endo. Endocrinology rec stopping of steroids since no evidence of adrenal insufficiency was found.        # Acute on chronic normocytic anemia  - Hgb 6 on admission, baseline 8-9, Hb 7.8 >>7.5 today  -Procrit 5000 units subcut/week  >>  increased  to 7000 units sc /week  monitor cbc , transfuse if hb <7    # Oliguric SILVINA on CKD (now stage 4) developed in past 6 months   w/ Hyperchloremic Metabolic Acidosis   (Cr  baseline 1.1 in   8/ 2018; 3.5  on June 18,2019)  - continuing po NA bicarb 650 tid  - Stable Cr, trending down.\ >>3.8 >>3.4 today , stable.    - Kidney US on 6/4 shows no hydronephrosis, bilateral echogenic kidney compatible with medical renal disease  - switched  renagel to phoslo.     #Pressure ulcers  On sacrum, Rt and Lt buttock  followed by burn    #Foot ulcer  Podiatry consulted  Wound assessed and will be followed  Cleaned with water and soap and apply Allevyn Daily   Offloading to L Heel    #Dysuria  improved after irrigation of SPC  patient reported that he has been changed SPC several days prior to admission at home and has been going up and down sizes whenever it leaked  UA/UCx sent, r/o CAUTI, negative  irrigate 100cc sterile water q shift  Off abx.     #Persistent Hypoglycemia ( asymptomatic)    Unlikely  adrenal insufficiency.   No evidence of any pituitary mass  holding steroid, Monitor FS daily.      # HTN  - Well- controlled  - Monitor off medication for now    # DM2 ? Highly doubt with normal A1c  - HbA1C  5.5    # Chronic back pain with functional paraplegia  -Pain management on board  - deescalated  methadone to 40 mg bid and gabapentin dosages    # Anxiety  - deescalating benzos      DVT ppx: Heparin subQ  GI ppx: Not indicated  Diet: Renal restricted  Activity: increase as tolerated  Code status: full code    #Progress Note Handoff  Pending (specify) : PT re -eval   Family discussion: D/W the mother.  Disposition: prefers home now over SNF. Anticipate for tomorrow.

## 2019-07-15 NOTE — PROGRESS NOTE ADULT - ASSESSMENT
58 years old male with reported PMH of HTN, chronic back pain, chronic anemia, L1 burst fracture with paraplegia since 1986 (bedbound and wheelchair bound), PAD s/p recent BKA on 6/4, neurogenic bladder with suprapubic catheter presented with weakness and generalized pain.    Patient [id number] days in the hospital. Patient seen and examined. Patient is looking better and responsive. MR brain has been done and reported, conveyed to patient and Endo. Endocrinology rec stopping of steroids since no evidence of adrenal insufficiency was found.        # Acute on chronic normocytic anemia  - Hgb 6 on admission, baseline 8-9, Hb 7.8 >>7.5 today  -Procrit 5000 units subcut/week  >>  increased  to 7000 units sc /week  monitor cbc , transfuse if hb <7    # Oliguric SILVINA on CKD (now stage 4) developed in past 6 months   w/ Hyperchloremic Metabolic Acidosis   (Cr  baseline 1.1 in   8/ 2018; 3.5  on June 18,2019)  - continuing po NA bicarb 650 tid  - Stable Cr, trending down.>>3.8 >>3.4 today , stable.    - Kidney US on 6/4 shows no hydronephrosis, bilateral echogenic kidney compatible with medical renal disease  - switched  renagel to phoslo.     #Pressure ulcers  On sacrum, Rt and Lt buttock  followed by burn    #Foot ulcer  Podiatry consulted  Wound assessed and will be followed  Cleaned with water and soap and apply Allevyn Daily   Offloading to L Heel    #Dysuria  improved after irrigation of SPC  patient reported that he has been changed SPC several days prior to admission at home and has been going up and down sizes whenever it leaked  irrigate 100cc sterile water q shift  Off abx.     #Persistent Hypoglycemia ( asymptomatic)    Unlikely  adrenal insufficiency.   No evidence of any pituitary mass  holding steroid, Monitor FS daily.      # HTN  - Well- controlled  - Monitor off medication for now    # DM2 ? Highly doubt with normal A1c  - HbA1C  5.5    # Chronic back pain with functional paraplegia  -Pain management on board  - deescalated  methadone to 40 mg tid and gabapentin dosages    # Anxiety  - deescalating benzos      DVT ppx: Heparin subQ  GI ppx: Not indicated  Diet: Renal restricted  Activity: increase as tolerated  Code status: full code    #Progress Note Handoff  Pending (specify) : PT re -eval   Family discussion: D/W the mother.  Disposition: prefers home now over SNF. Anticipate for tomorrow. 58 years old male with reported PMH of HTN, chronic back pain, chronic anemia, L1 burst fracture with paraplegia since 1986 (bedbound and wheelchair bound), PAD s/p recent BKA on 6/4, neurogenic bladder with suprapubic catheter presented with weakness and generalized pain.    Patient [id number] days in the hospital. Patient seen and examined. Patient is looking better and responsive. MR brain has been done and reported, conveyed to patient and Endo. Endocrinology rec stopping of steroids since no evidence of adrenal insufficiency was found.        # Acute on chronic normocytic anemia  - Hgb 6 on admission, baseline 8-9, Hb 7.8 >>7.5 today  -Procrit 5000 units subcut/week  >>  increased  to 7000 units sc /week  monitor cbc , transfuse if hb <7    # Oliguric SILVINA on CKD (now stage 4) developed in past 6 months   w/ Hyperchloremic Metabolic Acidosis   (Cr  baseline 1.1 in   8/ 2018; 3.5  on June 18,2019)  - continuing po NA bicarb 650 tid  - Stable Cr, trending down.>>3.8 >>3.4 today , stable.    - Kidney US on 6/4 shows no hydronephrosis, bilateral echogenic kidney compatible with medical renal disease  - switched  renagel to phoslo.     #Pressure ulcers  On sacrum, Rt and Lt buttock  followed by burn    #Foot ulcer  Podiatry consulted  Wound assessed and will be followed  Cleaned with water and soap and apply Allevyn Daily   Offloading to L Heel    #Dysuria  improved after irrigation of SPC  patient reported that he has been changed SPC several days prior to admission at home and has been going up and down sizes whenever it leaked  irrigate 100cc sterile water q shift  Resolved after urolgy proceudure  Off abx.     #Persistent Hypoglycemia ( asymptomatic)    Unlikely  adrenal insufficiency.   No evidence of any pituitary mass  holding steroid, Monitor FS daily.      # HTN  - Well- controlled  - Monitor off medication for now    # DM2 ? Highly doubt with normal A1c  - HbA1C  5.5    # Chronic back pain with functional paraplegia  -Pain management on board  - deescalated  methadone to 40 mg tid and gabapentin dosages    # Anxiety  - deescalating benzos      DVT ppx: Heparin subQ  GI ppx: Not indicated  Diet: Renal restricted  Activity: increase as tolerated  Code status: full code    #Progress Note Handoff  Pending (specify) : PT re -eval   Family discussion: D/W the mother.  Disposition: prefers home now over SNF. Anticipate for tomorrow.

## 2019-07-15 NOTE — PROGRESS NOTE ADULT - ASSESSMENT
58 M w/ PMH of Paraplegia 2/2 Lumbar compression Fx, sacral decubiti and heel ulcers s/p debridements,, PAD, suprapubic cath, Neuropathy, DM2 & HTN , Rt gsngrene, sp BKA admitted with s/p fall found to be in SILVINA and have hyperKalemia.    # creatinine stable   # non oliguric   # patient with significant decrease in GFR in view of BKA  # ? benefit of kidney biopsy  # 289 mg/dl/24 h urine so no evidence of gross proteinuria   # continue phoslo / repeat Phosphorus level in AM   # h/h noted, no Venofer elevated sat, on  procrit 5000 units s/c weekly transfuse if Hb <7, increase procrit to 7000 units s/c weekly   # started on Norvasc , BP better controlled   # no acute indication for RRT  # will follow

## 2019-07-15 NOTE — PROGRESS NOTE ADULT - SUBJECTIVE AND OBJECTIVE BOX
Nephrology progress note    Patient is seen and examined, events over the last 24 h noted .    Allergies:  sulfamethizole (Unknown)    Hospital Medications:   MEDICATIONS  (STANDING):  amLODIPine   Tablet 5 milliGRAM(s) Oral every 24 hours  calcium acetate 667 milliGRAM(s) Oral three times a day with meals  chlorhexidine 4% Liquid 1 Application(s) Topical <User Schedule>  Dakins Solution - 1/2 Strength 1 Application(s) Topical two times a day  dextrose 40% Gel 15 Gram(s) Oral once  epoetin kecia Injectable 7000 Unit(s) SubCutaneous every 7 days  gabapentin 200 milliGRAM(s) Oral daily  heparin  Injectable 5000 Unit(s) SubCutaneous every 8 hours  methadone    Tablet 40 milliGRAM(s) Oral two times a day  sodium bicarbonate 650 milliGRAM(s) Oral three times a day        VITALS:  T(F): 97 (07-15-19 @ 04:30), Max: 98.7 (07-14-19 @ 21:14)  HR: 72 (07-15-19 @ 04:30)  BP: 129/73 (07-15-19 @ 04:30)  RR: 18 (07-15-19 @ 04:30)  SpO2: 99% (07-14-19 @ 19:30)  Wt(kg): --    07-13 @ 07:01  -  07-14 @ 07:00  --------------------------------------------------------  IN: 0 mL / OUT: 1850 mL / NET: -1850 mL    07-14 @ 07:01  -  07-15 @ 07:00  --------------------------------------------------------  IN: 0 mL / OUT: 1000 mL / NET: -1000 mL          PHYSICAL EXAM:  Constitutional: NAD  HEENT: anicteric sclera, oropharynx clear, MMM  Neck: No JVD  Respiratory: CTAB, no wheezes, rales or rhonchi  Cardiovascular: S1, S2, RRR  Gastrointestinal: BS+, soft, NT/ND  Extremities: No cyanosis or clubbing. No peripheral edema  :  No brown.   Skin: No rashes    LABS:  07-15    142  |  109  |  30<H>  ----------------------------<  75  3.7   |  23  |  3.4<H>    Creatinine Trend: 3.4<--, 3.3<--, 3.5<--, 3.4<--, 3.8<--, 3.8<--    Ca    7.2<L>      15 Jul 2019 06:26                            7.5    8.27  )-----------( 306      ( 15 Jul 2019 06:26 )             24.1       Urine Studies:    Creatinine, Random Urine: 19 mg/dL (07-12 @ 06:00)  Osmolality, Random Urine: 222 mos/kg (07-10 @ 15:10)  Sodium, Random Urine: 83.0 mmoL/L (07-10 @ 14:40)  Potassium, Random Urine: 12 mmol/L (07-10 @ 14:40)  Potassium, Random Urine: 12 mmol/L (07-10 @ 14:40)  Calcium, Random Urine: 3 mg/dL (07-10 @ 14:40)  Chloride, Random Urine: 73 (07-10 @ 14:40)    RADIOLOGY & ADDITIONAL STUDIES: Nephrology progress note    Patient is seen and examined, events over the last 24 h noted .  Denied any complaints no events     Allergies:  sulfamethizole (Unknown)    Hospital Medications:   MEDICATIONS  (STANDING):  amLODIPine   Tablet 5 milliGRAM(s) Oral every 24 hours  calcium acetate 667 milliGRAM(s) Oral three times a day with meals  Dakins Solution - 1/2 Strength 1 Application(s) Topical two times a day  dextrose 40% Gel 15 Gram(s) Oral once  epoetin kecia Injectable 7000 Unit(s) SubCutaneous every 7 days  gabapentin 200 milliGRAM(s) Oral daily  heparin  Injectable 5000 Unit(s) SubCutaneous every 8 hours  methadone    Tablet 40 milliGRAM(s) Oral two times a day  sodium bicarbonate 650 milliGRAM(s) Oral three times a day        VITALS:  T(F): 97 (07-15-19 @ 04:30), Max: 98.7 (07-14-19 @ 21:14)  HR: 72 (07-15-19 @ 04:30)  BP: 129/73 (07-15-19 @ 04:30)  RR: 18 (07-15-19 @ 04:30)  SpO2: 99% (07-14-19 @ 19:30)    07-13 @ 07:01  -  07-14 @ 07:00  --------------------------------------------------------  IN: 0 mL / OUT: 1850 mL / NET: -1850 mL    07-14 @ 07:01  -  07-15 @ 07:00  --------------------------------------------------------  IN: 0 mL / OUT: 1000 mL / NET: -1000 mL          PHYSICAL EXAM:  Constitutional: NAD  HEENT: anicteric sclera, oropharynx clear, MMM  Neck: No JVD  Respiratory: CTAB, no wheezes, rales or rhonchi  Cardiovascular: S1, S2, RRR  Gastrointestinal: BS+, soft, NT/ND  Extremities: No cyanosis or clubbing. No peripheral edema/ right BKA   :  No brown.   Skin: No rashes    LABS:  07-15    142  |  109  |  30<H>  ----------------------------<  75  3.7   |  23  |  3.4<H>    Creatinine Trend: 3.4<--, 3.3<--, 3.5<--, 3.4<--, 3.8<--, 3.8<--    Ca    7.2<L>      15 Jul 2019 06:26                            7.5    8.27  )-----------( 306      ( 15 Jul 2019 06:26 )             24.1       Urine Studies:      Creatinine, Random Urine: 19 mg/dL (07-12 @ 06:00)  Osmolality, Random Urine: 222 mos/kg (07-10 @ 15:10)  Sodium, Random Urine: 83.0 mmoL/L (07-10 @ 14:40)  Potassium, Random Urine: 12 mmol/L (07-10 @ 14:40)  Potassium, Random Urine: 12 mmol/L (07-10 @ 14:40)  Calcium, Random Urine: 3 mg/dL (07-10 @ 14:40)  Chloride, Random Urine: 73 (07-10 @ 14:40)    RADIOLOGY & ADDITIONAL STUDIES:

## 2019-07-15 NOTE — PROGRESS NOTE ADULT - SUBJECTIVE AND OBJECTIVE BOX
MARGE BELLA  58y  Male      Patient is a 58y old  Male who presents with a chief complaint of Acute kidney injury, anemia (15 Jul 2019 11:40)      INTERVAL HPI/OVERNIGHT EVENTS:      ******************************* REVIEW OF SYSTEMS:**********************************************  Resting comfortably in bed. c/o poor pain control though.   All other review of systems negative    *********************** VITALS ******************************************    T(F): 97 (07-15-19 @ 04:30)  HR: 72 (07-15-19 @ 04:30) (72 - 80)  BP: 129/73 (07-15-19 @ 04:30) (110/70 - 142/70)  RR: 18 (07-15-19 @ 04:30) (18 - 18)  SpO2: 99% (07-14-19 @ 19:30) (99% - 99%)    07-14-19 @ 07:01  -  07-15-19 @ 07:00  --------------------------------------------------------  IN: 0 mL / OUT: 1000 mL / NET: -1000 mL            07-14-19 @ 07:01  -  07-15-19 @ 07:00  --------------------------------------------------------  IN: 0 mL / OUT: 1000 mL / NET: -1000 mL        ******************************** PHYSICAL EXAM:**************************************************  GENERAL: NAD    PSYCH: no agitation, baseline mentation  HEENT:     NERVOUS SYSTEM:  Alert & Oriented X3,     PULMONARY: DESIRAE, CTA    CARDIOVASCULAR: S1S2 RRR    GI: Soft, NT, ND; BS present.    EXTREMITIES:  right BKA off staples, stable. LLE edema     LYMPH: No lymphadenopathy noted    SKIN: sacral decubiti    ******************************************************************************************    Consultant(s) Notes Reviewed:  [x ] YES  [ ] NO    Discussed with Consultants/Other Providers [ x] YES     **************************** LABS *******************************************************                          7.5    8.27  )-----------( 306      ( 15 Jul 2019 06:26 )             24.1     07-15    142  |  109  |  30<H>  ----------------------------<  75  3.7   |  23  |  3.4<H>    Ca    7.2<L>      15 Jul 2019 06:26            Lactate Trend        CAPILLARY BLOOD GLUCOSE      POCT Blood Glucose.: 114 mg/dL (14 Jul 2019 21:06)          **************************Active Medications *******************************************  sulfamethizole (Unknown)      acetaminophen   Tablet .. 650 milliGRAM(s) Oral every 6 hours PRN  amLODIPine   Tablet 5 milliGRAM(s) Oral every 24 hours  calcium acetate 667 milliGRAM(s) Oral three times a day with meals  chlorhexidine 4% Liquid 1 Application(s) Topical <User Schedule>  Dakins Solution - 1/2 Strength 1 Application(s) Topical two times a day  dextrose 40% Gel 15 Gram(s) Oral once  epoetin kecia Injectable 7000 Unit(s) SubCutaneous every 7 days  gabapentin 200 milliGRAM(s) Oral daily  heparin  Injectable 5000 Unit(s) SubCutaneous every 8 hours  methadone    Tablet 40 milliGRAM(s) Oral two times a day  sodium bicarbonate 650 milliGRAM(s) Oral three times a day      ***************************************************  RADIOLOGY & ADDITIONAL TESTS:    Imaging Personally Reviewed:  [ ] YES  [ ] NO    HEALTH ISSUES - PROBLEM Dx:  Lumbago without sciatica: Lumbago without sciatica

## 2019-07-15 NOTE — PROGRESS NOTE ADULT - SUBJECTIVE AND OBJECTIVE BOX
MARGE BELLA 58y Male  MRN#: [id number]     SUBJECTIVE  Patient is a 58y old Male who presents with a chief complaint of Acute kidney injury, anemia (15 Jul 2019 12:12)  Today is hospital day 19d, and this morning he is lying in bed without distress.   No acute overnight events.   Complains of pain secondary to reduction of his methadone. Refuses to go to nursing home asks to increase methadone. No new complains other than stated  OBJECTIVE  PAST MEDICAL & SURGICAL HISTORY  Anemia  PAD (peripheral artery disease)  Hypertension  Suprapubic catheter  Pressure ulcer  Diabetes  Lumbar compression fracture  S/P below knee amputation, right  S/P debridement  Toe amputation status, right  H/O hernia repair    ALLERGIES:  sulfamethizole (Unknown)    MEDICATIONS:  STANDING MEDICATIONS  amLODIPine   Tablet 5 milliGRAM(s) Oral every 24 hours  calcium acetate 667 milliGRAM(s) Oral three times a day with meals  chlorhexidine 4% Liquid 1 Application(s) Topical <User Schedule>  Dakins Solution - 1/2 Strength 1 Application(s) Topical two times a day  dextrose 40% Gel 15 Gram(s) Oral once  epoetin kecia Injectable 7000 Unit(s) SubCutaneous every 7 days  gabapentin 200 milliGRAM(s) Oral daily  heparin  Injectable 5000 Unit(s) SubCutaneous every 8 hours  methadone    Tablet 40 milliGRAM(s) Oral two times a day  sodium bicarbonate 650 milliGRAM(s) Oral three times a day    PRN MEDICATIONS  acetaminophen   Tablet .. 650 milliGRAM(s) Oral every 6 hours PRN    HOME MEDICATIONS  Home Medications:  gabapentin 800 mg oral tablet: 1 tab(s) orally 3 times a day (26 Jun 2019 21:52)  methadone 10 mg oral tablet: 8 tab(s) orally every 6 hours (26 Jun 2019 21:52)  oxyCODONE 30 mg oral tablet: 1 tab(s) orally 4 times a day (26 Jun 2019 21:52)  sodium hypochlorite 0.25% topical solution: 1 application topically 2 times a day (26 Jun 2019 21:52)  Testim 1% (50 mg/5 g) transdermal gel: 1 packet(s) transdermal once a day (in the morning) (26 Jun 2019 21:52)  Valium 10 mg oral tablet: orally 2 times a day (26 Jun 2019 21:52)  Xanax 2 mg oral tablet: orally once (at bedtime) (26 Jun 2019 21:52)      VITAL SIGNS: Last 24 Hours  T(C): 36.1 (15 Jul 2019 04:30), Max: 37.1 (14 Jul 2019 21:14)  T(F): 97 (15 Jul 2019 04:30), Max: 98.7 (14 Jul 2019 21:14)  HR: 72 (15 Jul 2019 04:30) (72 - 80)  BP: 129/73 (15 Jul 2019 04:30) (110/70 - 142/70)  BP(mean): --  RR: 18 (15 Jul 2019 04:30) (18 - 18)  SpO2: 99% (14 Jul 2019 19:30) (99% - 99%)    07-14-19 @ 07:01  -  07-15-19 @ 07:00  --------------------------------------------------------  IN: 0 mL / OUT: 1000 mL / NET: -1000 mL        LABS:                        7.5    8.27  )-----------( 306      ( 15 Jul 2019 06:26 )             24.1     07-15    142  |  109  |  30<H>  ----------------------------<  75  3.7   |  23  |  3.4<H>    Ca    7.2<L>      15 Jul 2019 06:26                      CAPILLARY BLOOD GLUCOSE      POCT Blood Glucose.: 114 mg/dL (14 Jul 2019 21:06)      RADIOLOGY:    PHYSICAL EXAM:  PHYSICAL EXAM:  GENERAL: NAD, AAO x 4, 58y M  HEAD:  Atraumatic, Normocephalic  EXTREMITIES: BKA site well healing w/o erthema  or exudates staples removed  Patient refuses further physical examination    ADMISSION SUMMARY  Patient is a 58y old Male who presents with a chief complaint of Acute kidney injury, anemia (15 Jul 2019 12:12)

## 2019-07-16 NOTE — DISCHARGE NOTE PROVIDER - HOSPITAL COURSE
58 years old male with reported PMH of HTN, chronic back pain, chronic anemia, L1 burst fracture with paraplegia since 1986 (bedbound and wheelchair bound), PAD s/p recent BKA on 6/4, neurogenic bladder with suprapubic catheter (recently changed 3 days ago) presented with weakness and generalized pain. History was also obtained from patient's mother at bedside. Patient was found to be on the floor this morning by his mother. Patient states that he was trying to transfer himself from bed to his wheelchair but he fell on the floor. Patient denies head trauma or LOC. His mother later found him and called the ambulance. Patient was recently admitted to [facility] for RLE gangrene and had BKA done on 6/4. Patient was discharged on 6/21 to SNF initially but patient refused, so he was discharged home instead. Patient has fallen at home 3 times since his discharge. Patient reports weakness, bilateral shoulder pain, and chronic low back pain. Patient denies fever/chills, cough, chest pain, shortness of breath, abdominal pain, nausea/vomiting, diarrhea/constipation. Patient was found to be anemic with hemoglobin of 6 (baseline 8-9) and elevated creatinine of 4.8 (baseline 0.8 in 2018, 3.8 in 2019).        In ED: 1 unit PRBC, 1L NS    58 years old male with reported PMH of HTN, chronic back pain, chronic anemia, L1 burst fracture with paraplegia since 1986 (bedbound and wheelchair bound), PAD s/p recent BKA on 6/4, neurogenic bladder with suprapubic catheter presented with weakness and generalized pain        # Acute on chronic normocytic anemia    - Hgb 6 on admission, baseline 8-9    - No signs of active bleeding, rectal exam shows brown stool    - s/p 1 units of PRBC, will follow up post-transfusion CBC    - Keep two 18-gauge IVs    - Active T&S. keep hgb above 7    - CBC daily    - Check iron studies, B12, folate        # Acute kidney injury on CKD 4    - Possibly due to pre-renal 2/2 dehydration and anima    - Kidney US on 6/4 shows no hydronephrosis, bilateral echogenic kidney compatible with medical renal disease    - Having adequate urine output from suprapubic catheter    - Check urine lytes, UA    - Encourage oral hydration    - BMP daily        # HTN    - Well- controlled    - Monitor off medication for now        # DM    - Was on Januvia but became hypoglycemic    - Check A1C        # Chronic back pain with functional paraplegia    - Continue methadone 80mg Q6hrs (approved by pain management in last visit), Oxycodone 30mg Q6hr PRN    - Renally dosed gabapentin (200mg QD)    - PT/Rehab (patient refused SNF but now agrees with rehab)        # Anxiety    - Continue Valium and Ativan        Hospital course    Patient methadone was decreased to 40q8. the suprapubic catheter had a leak and was resolved after  replaced it. 58 years old male with reported PMH of HTN, chronic back pain, chronic anemia, L1 burst fracture with paraplegia since 1986 (bedbound and wheelchair bound), PAD s/p recent BKA on 6/4, neurogenic bladder with suprapubic catheter (recently changed 3 days ago) presented with weakness and generalized pain. History was also obtained from patient's mother at bedside. Patient was found to be on the floor this morning by his mother. Patient states that he was trying to transfer himself from bed to his wheelchair but he fell on the floor. Patient denies head trauma or LOC. His mother later found him and called the ambulance. Patient was recently admitted to [facility] for RLE gangrene and had BKA done on 6/4. Patient was discharged on 6/21 to SNF initially but patient refused, so he was discharged home instead. Patient has fallen at home 3 times since his discharge. Patient reports weakness, bilateral shoulder pain, and chronic low back pain. Patient denies fever/chills, cough, chest pain, shortness of breath, abdominal pain, nausea/vomiting, diarrhea/constipation. Patient was found to be anemic with hemoglobin of 6 (baseline 8-9) and elevated creatinine of 4.8 (baseline 0.8 in 2018, 3.8 in 2019).        In ED: 1 unit PRBC, 1L NS    58 years old male with reported PMH of HTN, chronic back pain, chronic anemia, L1 burst fracture with paraplegia since 1986 (bedbound and wheelchair bound), PAD s/p recent BKA on 6/4, neurogenic bladder with suprapubic catheter presented with weakness and generalized pain        # Acute on chronic normocytic anemia    - Hgb 6 on admission, baseline 8-9    - No signs of active bleeding, rectal exam shows brown stool    - s/p 1 units of PRBC, will follow up post-transfusion CBC    - Keep two 18-gauge IVs    - Active T&S. keep hgb above 7    - CBC daily    - Check iron studies, B12, folate        # Acute kidney injury on CKD 4    - Possibly due to pre-renal 2/2 dehydration and anima    - Kidney US on 6/4 shows no hydronephrosis, bilateral echogenic kidney compatible with medical renal disease    - Having adequate urine output from suprapubic catheter    - Check urine lytes, UA    - Encourage oral hydration    - BMP daily        # HTN    - Well- controlled    - Monitor off medication for now        # DM    - Was on Januvia but became hypoglycemic    - Check A1C        # Chronic back pain with functional paraplegia    - Continue methadone 80mg Q6hrs (approved by pain management in last visit), Oxycodone 30mg Q6hr PRN    - Renally dosed gabapentin (200mg QD)    - PT/Rehab (patient refused SNF but now agrees with rehab)        # Anxiety    - Continue Valium and Ativan        Hospital course    Patient methadone was decreased to 40q8. Patient was evaluated by pain management .     PAtient was evaluated by endocrine for the  adrenal infufficiency due to vomiting and resolved DM. MRI is negative for mass.    Evaluated by renal, autoimmune workup  for renal disease is negative.     suprapubic catheter had a leak and was resolved after  replaced it.

## 2019-07-16 NOTE — PROGRESS NOTE ADULT - SUBJECTIVE AND OBJECTIVE BOX
Nephrology progress note    Patient is seen and examined, events over the last 24 h noted .    Allergies:  sulfamethizole (Unknown)    Hospital Medications:   MEDICATIONS  (STANDING):  amLODIPine   Tablet 5 milliGRAM(s) Oral every 24 hours  calcium acetate 667 milliGRAM(s) Oral three times a day with meals  chlorhexidine 4% Liquid 1 Application(s) Topical <User Schedule>  Dakins Solution - 1/2 Strength 1 Application(s) Topical two times a day  dextrose 40% Gel 15 Gram(s) Oral once  epoetin kecia Injectable 7000 Unit(s) SubCutaneous every 7 days  gabapentin 200 milliGRAM(s) Oral daily  heparin  Injectable 5000 Unit(s) SubCutaneous every 8 hours  methadone    Tablet 40 milliGRAM(s) Oral three times a day  sodium bicarbonate 650 milliGRAM(s) Oral three times a day        VITALS:  T(F): 98.6 (07-16-19 @ 05:10), Max: 98.9 (07-15-19 @ 21:42)  HR: 81 (07-16-19 @ 05:10)  BP: 133/61 (07-16-19 @ 05:10)  RR: 18 (07-16-19 @ 05:10)  SpO2: 100% (07-15-19 @ 19:51)  Wt(kg): --    07-14 @ 07:01  -  07-15 @ 07:00  --------------------------------------------------------  IN: 0 mL / OUT: 1000 mL / NET: -1000 mL    07-15 @ 07:01  -  07-16 @ 07:00  --------------------------------------------------------  IN: 0 mL / OUT: 3000 mL / NET: -3000 mL          PHYSICAL EXAM:  Constitutional: NAD  HEENT: anicteric sclera, oropharynx clear, MMM  Neck: No JVD  Respiratory: CTAB, no wheezes, rales or rhonchi  Cardiovascular: S1, S2, RRR  Gastrointestinal: BS+, soft, NT/ND  Extremities: No cyanosis or clubbing. No peripheral edema  :  No brown.   Skin: No rashes    LABS:  07-15    142  |  109  |  30<H>  ----------------------------<  75  3.7   |  23  |  3.4<H>  Creatinine Trend: 3.4<--, 3.3<--, 3.5<--, 3.4<--, 3.8<--, 3.8<--  Ca    7.2<L>      15 Jul 2019 06:26                            7.5    8.27  )-----------( 306      ( 15 Jul 2019 06:26 )             24.1       Urine Studies:    Creatinine, Random Urine: 19 mg/dL (07-12 @ 06:00)  Osmolality, Random Urine: 222 mos/kg (07-10 @ 15:10)  Sodium, Random Urine: 83.0 mmoL/L (07-10 @ 14:40)  Potassium, Random Urine: 12 mmol/L (07-10 @ 14:40)  Potassium, Random Urine: 12 mmol/L (07-10 @ 14:40)  Calcium, Random Urine: 3 mg/dL (07-10 @ 14:40)  Chloride, Random Urine: 73 (07-10 @ 14:40)    RADIOLOGY & ADDITIONAL STUDIES: Nephrology progress note    Patient is seen and examined, events over the last 24 h noted .  lethargic sleepy     Allergies:  sulfamethizole (Unknown)    Hospital Medications:   MEDICATIONS  (STANDING):  amLODIPine   Tablet 5 milliGRAM(s) Oral every 24 hours  calcium acetate 667 milliGRAM(s) Oral three times a day with meals  Dakins Solution - 1/2 Strength 1 Application(s) Topical two times a day  dextrose 40% Gel 15 Gram(s) Oral once  epoetin kecia Injectable 7000 Unit(s) SubCutaneous every 7 days  gabapentin 200 milliGRAM(s) Oral daily  heparin  Injectable 5000 Unit(s) SubCutaneous every 8 hours  methadone    Tablet 40 milliGRAM(s) Oral three times a day  sodium bicarbonate 650 milliGRAM(s) Oral three times a day        VITALS:  T(F): 98.6 (07-16-19 @ 05:10), Max: 98.9 (07-15-19 @ 21:42)  HR: 81 (07-16-19 @ 05:10)  BP: 133/61 (07-16-19 @ 05:10)  RR: 18 (07-16-19 @ 05:10)  SpO2: 100% (07-15-19 @ 19:51)  Wt(kg): --    07-14 @ 07:01  -  07-15 @ 07:00  --------------------------------------------------------  IN: 0 mL / OUT: 1000 mL / NET: -1000 mL    07-15 @ 07:01  -  07-16 @ 07:00  --------------------------------------------------------  IN: 0 mL / OUT: 3000 mL / NET: -3000 mL          PHYSICAL EXAM:  Constitutional: NAD  HEENT: anicteric sclera, oropharynx clear, MMM  Neck: No JVD  Respiratory: CTAB, no wheezes, rales or rhonchi  Cardiovascular: S1, S2, RRR  Gastrointestinal: BS+, soft, NT/ND  Extremities: No cyanosis or clubbing. No peripheral edema/ right BKA   :  No brown.   Skin: No rashes    LABS:  07-15    142  |  109  |  30<H>  ----------------------------<  75  3.7   |  23  |  3.4<H>  Creatinine Trend: 3.4<--, 3.3<--, 3.5<--, 3.4<--, 3.8<--, 3.8<--  Ca    7.2<L>      15 Jul 2019 06:26                            7.5    8.27  )-----------( 306      ( 15 Jul 2019 06:26 )             24.1       Urine Studies:    Creatinine, Random Urine: 19 mg/dL (07-12 @ 06:00)  Osmolality, Random Urine: 222 mos/kg (07-10 @ 15:10)  Sodium, Random Urine: 83.0 mmoL/L (07-10 @ 14:40)  Potassium, Random Urine: 12 mmol/L (07-10 @ 14:40)  Potassium, Random Urine: 12 mmol/L (07-10 @ 14:40)  Calcium, Random Urine: 3 mg/dL (07-10 @ 14:40)  Chloride, Random Urine: 73 (07-10 @ 14:40)    RADIOLOGY & ADDITIONAL STUDIES:

## 2019-07-16 NOTE — DISCHARGE NOTE PROVIDER - NSDCCPCAREPLAN_GEN_ALL_CORE_FT
PRINCIPAL DISCHARGE DIAGNOSIS  Diagnosis: Anemia  Assessment and Plan of Treatment: you have low red blood cell count. You need to follow with your primary doctor for best care. Return to ED if you have bleeding, shortness of breath lightheadedness or chest pain      SECONDARY DISCHARGE DIAGNOSES  Diagnosis: Decubitus ulcer  Assessment and Plan of Treatment: Continue wound care as directed. Return to the ER if you have fevers or oozing from your wounds    Diagnosis: SILVINA (acute kidney injury)  Assessment and Plan of Treatment: your kidney function was damaged for a short term and resolved with treatment    Diagnosis: Ambulatory dysfunction  Assessment and Plan of Treatment: Ambulate with assisstance as needed

## 2019-07-16 NOTE — PROGRESS NOTE ADULT - SUBJECTIVE AND OBJECTIVE BOX
MARGE BELLA  58y  Male      Patient is a 58y old  Male who presents with a chief complaint of Acute kidney injury, anemia (16 Jul 2019 09:54)      INTERVAL HPI/OVERNIGHT EVENTS:      ******************************* REVIEW OF SYSTEMS:**********************************************    remains stable with better pain control.   All other review of systems negative    *********************** VITALS ******************************************    T(F): 98.6 (07-16-19 @ 05:10)  HR: 81 (07-16-19 @ 05:10) (80 - 81)  BP: 133/61 (07-16-19 @ 05:10) (127/72 - 133/61)  RR: 18 (07-16-19 @ 05:10) (18 - 18)  SpO2: 100% (07-15-19 @ 19:51) (100% - 100%)    07-15-19 @ 07:01  -  07-16-19 @ 07:00  --------------------------------------------------------  IN: 0 mL / OUT: 3000 mL / NET: -3000 mL            07-15-19 @ 07:01  -  07-16-19 @ 07:00  --------------------------------------------------------  IN: 0 mL / OUT: 3000 mL / NET: -3000 mL        ******************************** PHYSICAL EXAM:**************************************************  GENERAL: NAD    PSYCH: no agitation, baseline mentation  HEENT:     NERVOUS SYSTEM:  Alert & Oriented X3, MS  5/5 B/L  UE and LE ; Sensory intact    PULMONARY: DESIRAE, CTA    CARDIOVASCULAR: S1S2 RRR    GI: Soft, NT, ND; BS present.    EXTREMITIES:  right BKA with off staples.     LYMPH: No lymphadenopathy noted    SKIN: sacral decubiti   ******************************************************************************************    Consultant(s) Notes Reviewed:  [x ] YES  [ ] NO    Discussed with Consultants/Other Providers [ x] YES     **************************** LABS *******************************************************                          7.5    8.27  )-----------( 306      ( 15 Jul 2019 06:26 )             24.1     07-15    142  |  109  |  30<H>  ----------------------------<  75  3.7   |  23  |  3.4<H>    Ca    7.2<L>      15 Jul 2019 06:26            Lactate Trend        CAPILLARY BLOOD GLUCOSE      POCT Blood Glucose.: 114 mg/dL (14 Jul 2019 21:06)          **************************Active Medications *******************************************  sulfamethizole (Unknown)      acetaminophen   Tablet .. 650 milliGRAM(s) Oral every 6 hours PRN  amLODIPine   Tablet 5 milliGRAM(s) Oral every 24 hours  calcium acetate 667 milliGRAM(s) Oral three times a day with meals  chlorhexidine 4% Liquid 1 Application(s) Topical <User Schedule>  Dakins Solution - 1/2 Strength 1 Application(s) Topical two times a day  dextrose 40% Gel 15 Gram(s) Oral once  epoetin kecia Injectable 7000 Unit(s) SubCutaneous every 7 days  gabapentin 200 milliGRAM(s) Oral daily  heparin  Injectable 5000 Unit(s) SubCutaneous every 8 hours  methadone    Tablet 40 milliGRAM(s) Oral three times a day  sodium bicarbonate 650 milliGRAM(s) Oral three times a day      ***************************************************  RADIOLOGY & ADDITIONAL TESTS:    Imaging Personally Reviewed:  [ ] YES  [ ] NO    HEALTH ISSUES - PROBLEM Dx:  Lumbago without sciatica: Lumbago without sciatica

## 2019-07-16 NOTE — PROGRESS NOTE ADULT - ASSESSMENT
58 years old male with reported PMH of HTN, chronic back pain, chronic anemia, L1 burst fracture with paraplegia since 1986 (bedbound and wheelchair bound), PAD s/p recent BKA on 6/4, neurogenic bladder with suprapubic catheter presented with weakness and generalized pain.    Patient [id number] days in the hospital. Patient seen and examined. Patient is looking better and responsive. MR brain has been done and reported, conveyed to patient and Endo. Endocrinology rec stopping of steroids since no evidence of adrenal insufficiency was found.        # Acute on chronic normocytic anemia  - Hgb 6 on admission, baseline 8-9, Hb 7.8 >>7.5 today  -Procrit 5000 units subcut/week  >>  increased  to 7000 units sc /week  monitor cbc , transfuse if hb <7    # Oliguric SILVINA on CKD (now stage 4) developed in past 6 months   w/ Hyperchloremic Metabolic Acidosis   (Cr  baseline 1.1 in   8/ 2018; 3.5  on June 18,2019)  - continuing po NA bicarb 650 tid  - Stable Cr, trending down.\ >>3.8 >>3.4 today , stable.    - Kidney US on 6/4 shows no hydronephrosis, bilateral echogenic kidney compatible with medical renal disease  - switched  renagel to phoslo.   f/u with renal as out pt re: possible kidney bx ?.    #Pressure ulcers  On sacrum, Rt and Lt buttock  followed by burn    #Foot ulcer  Podiatry consulted  Wound assessed and will be followed  Cleaned with water and soap and apply Allevyn Daily   Offloading to L Heel    #Dysuria  improved after irrigation of SPC  patient reported that he has been changed SPC several days prior to admission at home and has been going up and down sizes whenever it leaked  UA/UCx sent, negative  Off abx.     #Persistent Hypoglycemia ( asymptomatic)    Unlikely  adrenal insufficiency.   No evidence of any pituitary mass  holding steroid, Monitor FS daily.      # HTN  - Well- controlled with amlodipine 5mg       # DM2 ? Highly doubt with normal A1c  - HbA1C  5.5    # Chronic back pain with functional paraplegia  -Pain management on board  - deescalated  methadone to 40 mg now TID and gabapentin dosages    # Anxiety  - deescalating benzos      DVT ppx: Heparin subQ  GI ppx: Not indicated  Diet: Renal restricted  Activity: increase as tolerated  Code status: full code    #Progress Note Handoff  Pending (specify) :   Family discussion: D/W the mother.  Disposition: prefers home now over SNF. D/C planning today.

## 2019-07-16 NOTE — DISCHARGE NOTE PROVIDER - CARE PROVIDER_API CALL
bella velez  Phone: (   )    -  Fax: (   )    -  Follow Up Time: Keaton Hansen)  Internal Medicine; Nephrology  [address] [place] [address]  Phone: [phone]  Fax: [phone]  Follow Up Time:     bella velez  Phone: (   )    -  Fax: (   )    -  Follow Up Time:     Krzysztof Cox (DPM)  [facility]  [address], [address]  [place] [address]  Phone: [phone]  Fax: [phone]  Follow Up Time:

## 2019-07-16 NOTE — PROGRESS NOTE ADULT - PROVIDER SPECIALTY LIST ADULT
Endocrinology
Endocrinology
Hospitalist
Internal Medicine
Nephrology
Neurology
Podiatry
Urology
Hospitalist
Nephrology
Internal Medicine
Hospitalist

## 2019-07-16 NOTE — DISCHARGE NOTE PROVIDER - CARE PROVIDERS DIRECT ADDRESSES
,DirectAddress_Unknown [email or web address],DirectAddress_Unknown,DirectAddress_Unknown

## 2019-07-16 NOTE — DISCHARGE NOTE PROVIDER - PROVIDER TOKENS
FREE:[LAST:[rosie],FIRST:[bella],PHONE:[(   )    -],FAX:[(   )    -]] PROVIDER:[TOKEN:[[id number]]],FREE:[LAST:[velez],FIRST:[bella],PHONE:[(   )    -],FAX:[(   )    -]],PROVIDER:[TOKEN:[[id number]]]

## 2019-07-16 NOTE — PROGRESS NOTE ADULT - REASON FOR ADMISSION
Acute kidney injury, anemia
Acute kidney injury  Anemia
Acute kidney injury, anemia

## 2019-07-16 NOTE — PROGRESS NOTE ADULT - ASSESSMENT
58 M w/ PMH of Paraplegia 2/2 Lumbar compression Fx, sacral decubiti and heel ulcers s/p debridements,, PAD, suprapubic cath, Neuropathy, DM2 & HTN , Rt gsngrene, sp BKA admitted with s/p fall found to be in SILVINA and have hyperKalemia.    # creatinine stable   # non oliguric   # patient with significant decrease in GFR in view of BKA  # ? benefit of kidney biopsy  # 289 mg/dl/24 h urine so no evidence of gross proteinuria   # continue phoslo / repeat Phosphorus level in AM   # h/h noted, no Venofer elevated sat, on  procrit 5000 units s/c weekly transfuse if Hb <7, increase procrit to 7000 units s/c weekly   # started on Norvasc , BP better controlled   # no acute indication for RRT  # will sign off/ will need OP renal follow up at [facility] clinic

## 2019-07-16 NOTE — DISCHARGE NOTE NURSING/CASE MANAGEMENT/SOCIAL WORK - NSDCDPATPORTLINK_GEN_ALL_CORE
You can access the [facility] Portal, offered by [facility], by registering with the following website: [email or web address]

## 2019-08-05 PROBLEM — Z89.419 HISTORY OF AMPUTATION OF HALLUX: Status: ACTIVE | Noted: 2017-08-08

## 2019-09-09 NOTE — ED ADULT NURSE NOTE - NSIMPLEMENTINTERV_GEN_ALL_ED
Implemented All Fall with Harm Risk Interventions:  [place] to call system. Call bell, personal items and telephone within reach. Instruct patient to call for assistance. Room bathroom lighting operational. Non-slip footwear when patient is off stretcher. Physically safe environment: no spills, clutter or unnecessary equipment. Stretcher in lowest position, wheels locked, appropriate side rails in place. Provide visual cue, wrist band, yellow gown, etc. Monitor gait and stability. Monitor for mental status changes and reorient to person, place, and time. Review medications for side effects contributing to fall risk. Reinforce activity limits and safety measures with patient and family. Provide visual clues: red socks.

## 2019-09-10 PROBLEM — I73.9 PERIPHERAL VASCULAR DISEASE, UNSPECIFIED: Chronic | Status: ACTIVE | Noted: 2019-01-01

## 2019-09-10 PROBLEM — D64.9 ANEMIA, UNSPECIFIED: Chronic | Status: ACTIVE | Noted: 2019-01-01

## 2019-09-10 NOTE — PROGRESS NOTE ADULT - ASSESSMENT
57yo paraplegic M w/ PMH of HTN, chronic decubitus ulcer (follows Dr. Gooden in [facility]), R leg amputation p/w AMS. In ED, pt was still obtunded, not waking up. As per ED physician, pt woke up after 1st narcan push, but became obtunded again. Pt had a great relief after flumazenil push, pt waking up and alert. Pt reports taking 3-4 pills of 2mg xanax at night. Pt was found to have SILVINA of cr of 5.5 (baseline 3.4). Elevated trop of 0.09 noted. VBG shows pH 7.09, CO2 42, HCO3 13. Bicarb drip started.       AMS 2/2 substance abuse (benzo more likely than opioid)  - Pt takes a lot of benzo and opioids as per home med hx. Reports taking 3-4 pills of 2mg xanax at night  - + great response from flumazenil push  - DC all benzo and narcotics  - urine tox screen.   - close mental status monitoring    SILVINA  - Cr 5.5 noted (baseline 3.4)  - c/w bicarb drip  - BMP in am, f/u lytes  - brown catheter to monitor urine output  - urine sodium, urine creatinine  - avoid nephrotoxic agents. DC gabapentin   - nephrology consult    Leukocytosis  - WBC 15k noted. Pt afebrile  - Pt has chronic decubitus ulcer (follows Dr. Gooden)  - RUSHING culture  - IV abx  - ID consult    Elevated Troponin  - Trop 0.09 noted. More likely from SILVINA, but need to r/o ACS  - Pt asymptomatic w/o CP, palpitation, SOB  - Serial Ravindra   - EKG in am  - cardio consult    HTN  - c/w amlodipine      DVT prophylaxis: heparin subq  GI prophylaxis: PPI  Diet: NPO

## 2019-09-10 NOTE — H&P ADULT - ASSESSMENT
57yo paraplegic M w/ PMH of HTN, chronic decubitus ulcer (follows Dr. Gooden in [facility]), R leg amputation p/w AMS. In ED, pt was still obtunded, not waking up. As per ED physician, pt woke up after 1st narcan push, but became obtunded again. Pt had a great relief after flumazenil push, pt waking up and alert. Pt reports taking 3-4 pills of 2mg xanax at night. Pt was found to have SILVINA of cr of 5.5 (baseline 3.4). Elevated trop of 0.09 noted. VBG shows pH 7.09, CO2 42, HCO3 13. Bicarb drip started.     Discussed w/ Dr. Moy. Admit to critical care    AMS 2/2 substance abuse (benzo more likely than opioid)  - Pt takes a lot of benzo and opioids as per home med hx. Reports taking 3-4 pills of 2mg xanax at night  - + great response from flumazenil push  - DC all benzo and narcotics  - STAT ABG. Initial VBG: combination of metabolic acidosis and respiratory acidosis  - If pt becomes obtunded again, will push another flumazenil  - close mental status monitoring  - NPO for now  - ICU monitoring    SILVINA  - Cr 5.5 noted (baseline 3.4)  - c/w bicarb drip  - BMP in am, f/u lytes  - brown catheter to monitor urine output  - urine sodium, urine creatinine    Elevated Troponin  - Trop 0.09 noted. More likely from SILVINA, but need to r/o ACS  - Pt asymptomatic w/o CP, palpitation, SOB  - Serial Ravindra   - EKG and 2D echo in am    HTN  - c/w amlodipine      DVT prophylaxis: heparin subq  GI prophylaxis: not indicated  Diet: NPO 59yo paraplegic M w/ PMH of HTN, chronic decubitus ulcer (follows Dr. Gooden in [facility]), R leg amputation p/w AMS. In ED, pt was still obtunded, not waking up. As per ED physician, pt woke up after 1st narcan push, but became obtunded again. Pt had a great relief after flumazenil push, pt waking up and alert. Pt reports taking 3-4 pills of 2mg xanax at night. Pt was found to have SILVINA of cr of 5.5 (baseline 3.4). Elevated trop of 0.09 noted. VBG shows pH 7.09, CO2 42, HCO3 13. Bicarb drip started.     Discussed w/ Dr. Moy. Admit to critical care    AMS 2/2 substance abuse (benzo more likely than opioid)  - Pt takes a lot of benzo and opioids as per home med hx. Reports taking 3-4 pills of 2mg xanax at night  - + great response from flumazenil push  - DC all benzo and narcotics  - STAT ABG. Initial VBG: combination of metabolic acidosis and respiratory acidosis  - If pt becomes obtunded again, will push another flumazenil  - close mental status monitoring  - NPO for now  - ICU monitoring    SILVINA  - Cr 5.5 noted (baseline 3.4)  - c/w bicarb drip  - BMP in am, f/u lytes  - brown catheter to monitor urine output  - urine sodium, urine creatinine    Leukocytosis  - WBC 13k noted. Pt afebrile  - Pt has chronic decubitus ulcer (follows Dr. Gooden)  - RUSHING culture  - IV abx  - ID consult    Elevated Troponin  - Trop 0.09 noted. More likely from SILVINA, but need to r/o ACS  - Pt asymptomatic w/o CP, palpitation, SOB  - Serial Ravindra   - EKG and 2D echo in am    HTN  - c/w amlodipine      DVT prophylaxis: heparin subq  GI prophylaxis: not indicated  Diet: NPO 57yo paraplegic M w/ PMH of HTN, chronic decubitus ulcer (follows Dr. Gooden in [facility]), R leg amputation p/w AMS. In ED, pt was still obtunded, not waking up. As per ED physician, pt woke up after 1st narcan push, but became obtunded again. Pt had a great relief after flumazenil push, pt waking up and alert. Pt reports taking 3-4 pills of 2mg xanax at night. Pt was found to have SILVINA of cr of 5.5 (baseline 3.4). Elevated trop of 0.09 noted. VBG shows pH 7.09, CO2 42, HCO3 13. Bicarb drip started.     Discussed w/ Dr. Moy. Admit to critical care    AMS 2/2 substance abuse (benzo more likely than opioid)  - Pt takes a lot of benzo and opioids as per home med hx. Reports taking 3-4 pills of 2mg xanax at night  - + great response from flumazenil push  - DC all benzo and narcotics  - STAT ABG. Initial VBG: combination of metabolic acidosis and respiratory acidosis  - If pt becomes obtunded again, will push another flumazenil  - close mental status monitoring  - NPO for now  - ICU monitoring    SILVINA  - Cr 5.5 noted (baseline 3.4)  - c/w bicarb drip  - BMP in am, f/u lytes  - brown catheter to monitor urine output  - urine sodium, urine creatinine    Leukocytosis  - WBC 13k noted. Pt afebrile  - Pt has chronic decubitus ulcer (follows Dr. oGoden)  - RUSHING culture  - IV abx  - ID consult    Elevated Troponin  - Trop 0.09 noted. More likely from SILVINA, but need to r/o ACS  - Pt asymptomatic w/o CP, palpitation, SOB  - Serial Ravindra   - EKG in am    Hyperkalemia  - Insulin + D50  - BMP in am, f/u lytes     HTN  - c/w amlodipine      DVT prophylaxis: heparin subq  GI prophylaxis: PPI  Diet: NPO 59yo paraplegic M w/ PMH of HTN, chronic decubitus ulcer (follows Dr. Gooden in [facility]), R leg amputation p/w AMS. In ED, pt was still obtunded, not waking up. As per ED physician, pt woke up after 1st narcan push, but became obtunded again. Pt had a great relief after flumazenil push, pt waking up and alert. Pt reports taking 3-4 pills of 2mg xanax at night. Pt was found to have SILVINA of cr of 5.5 (baseline 3.4). Elevated trop of 0.09 noted. VBG shows pH 7.09, CO2 42, HCO3 13. Bicarb drip started.     Discussed w/ Dr. Mancillaed. Admit to critical care    AMS 2/2 substance abuse (benzo more likely than opioid)  - Pt takes a lot of benzo and opioids as per home med hx. Reports taking 3-4 pills of 2mg xanax at night  - + great response from flumazenil push  - DC all benzo and narcotics  - STAT ABG. Initial VBG: combination of metabolic acidosis and respiratory acidosis  - If pt becomes obtunded again, will push another flumazenil  - urine tox screen.   - close mental status monitoring  - NPO for now  - ICU monitoring    SILVINA  - Cr 5.5 noted (baseline 3.4)  - c/w bicarb drip  - BMP in am, f/u lytes  - brown catheter to monitor urine output  - urine sodium, urine creatinine  - avoid nephrotoxic agents. DC gabapentin     Leukocytosis  - WBC 13k noted. Pt afebrile  - Pt has chronic decubitus ulcer (follows Dr. Gooden)  - RUSHING culture  - IV abx  - ID consult    Elevated Troponin  - Trop 0.09 noted. More likely from SILVINA, but need to r/o ACS  - Pt asymptomatic w/o CP, palpitation, SOB  - Serial Ravindra   - EKG in am    HTN  - c/w amlodipine      DVT prophylaxis: heparin subq  GI prophylaxis: PPI  Diet: NPO 59yo paraplegic M w/ PMH of HTN, chronic decubitus ulcer (follows Dr. Gooden in [facility]), R leg amputation p/w AMS. In ED, pt was still obtunded, not waking up. As per ED physician, pt woke up after 1st narcan push, but became obtunded again. Pt had a great relief after flumazenil push, pt waking up and alert. Pt reports taking 3-4 pills of 2mg xanax at night. Pt was found to have SILVINA of cr of 5.5 (baseline 3.4). Elevated trop of 0.09 noted. VBG shows pH 7.09, CO2 42, HCO3 13. Bicarb drip started.     Discussed w/ Dr. Moy. Admit to critical care    AMS 2/2 substance abuse (benzo more likely than opioid)  - Pt takes a lot of benzo and opioids as per home med hx. Reports taking 3-4 pills of 2mg xanax at night  - s/p 3 narcan push and 1 flumazenil push  - + great response from flumazenil push  - DC all benzo and narcotics  - STAT ABG. Initial VBG: combination of metabolic acidosis and respiratory acidosis  - If pt becomes obtunded again, will push another flumazenil  - urine tox screen.   - close mental status monitoring  - NPO for now  - ICU monitoring    SILVINA  - Cr 5.5 noted (baseline 3.4)  - c/w bicarb drip  - BMP in am, f/u lytes  - brown catheter to monitor urine output  - urine sodium, urine creatinine  - avoid nephrotoxic agents. DC gabapentin     Leukocytosis  - WBC 13k noted. Pt afebrile  - Pt has chronic decubitus ulcer (follows Dr. Gooden)  - RUSHING culture  - IV abx  - ID consult    Elevated Troponin  - Trop 0.09 noted. More likely from SILVINA, but need to r/o ACS  - Pt asymptomatic w/o CP, palpitation, SOB  - Serial Ravindra   - EKG in am    HTN  - c/w amlodipine      DVT prophylaxis: heparin subq  GI prophylaxis: PPI  Diet: NPO

## 2019-09-10 NOTE — H&P ADULT - NSHPLABSRESULTS_GEN_ALL_CORE
I&O's Detail        LABS:                        7.9    13.85 )-----------( 294      ( 09 Sep 2019 20:45 )             25.8     09 Sep 2019 20:45    142    |  117    |  79     ----------------------------<  60     5.9     |  12     |  5.5      Ca    8.3        09 Sep 2019 20:45  Mg     1.5       09 Sep 2019 20:45    TPro  5.7    /  Alb  2.0    /  TBili  <0.2   /  DBili  <0.2   /  AST  31     /  ALT  20     /  AlkPhos  170    09 Sep 2019 20:45  Amylase x     lipase 16         CARDIAC MARKERS ( 09 Sep 2019 20:45 )  x     / 0.09 ng/mL / x     / x     / x          CAPILLARY BLOOD GLUCOSE      POCT Blood Glucose.: 124 mg/dL (10 Sep 2019 00:06)    PT/INR - ( 09 Sep 2019 20:45 )   PT: 14.50 sec;   INR: 1.26 ratio         PTT - ( 09 Sep 2019 20:45 )  PTT:38.8 sec  Urinalysis Basic - ( 09 Sep 2019 20:34 )    Color: Blue / Appearance: Cloudy / S.025 / pH: x  Gluc: x / Ketone: Negative  / Bili: Moderate / Urobili: 0.2 mg/dL   Blood: x / Protein: >=300 mg/dL / Nitrite: Negative   Leuk Esterase: Small / RBC: 6-10 /HPF / WBC 10-25 /HPF   Sq Epi: x / Non Sq Epi: Few /HPF / Bacteria: TNTC /HPF      Culture    Lactate, Blood: 0.7 mmol/L (19 @ 20:45)      MEDICATIONS  (STANDING):  cefepime   IVPB 1000 milliGRAM(s) IV Intermittent every 8 hours  sodium bicarbonate  Infusion 0.456 mEq/kG/Hr (200 mL/Hr) IV Continuous <Continuous>  vancomycin  IVPB 1000 milliGRAM(s) IV Intermittent once    MEDICATIONS  (PRN):        RADIOLOGY:     < from: CT Head No Cont (19 @ 21:03) >    IMPRESSION:    No CT evidence of acute intracranial pathology.    Chronic microvascular ischemic changes.    < end of copied text >      < from: CT Abdomen and Pelvis No Cont (19 @ 22:20) >    IMPRESSION:    Urinary bladder wall thickening despite collapsed around asuprapubic   catheter.  No hydronephrosis or evidence of ureteral calculus. Punctate   nonobstructing right renal calculi.    Small bilateral pleural effusions with compressive atelectasis.    Likely underdistention versus proximal ascending colonic wall thickening.   A mild colitis is not excluded.    Chronic decubitus ulcers with presumably chronic underlying osteitis.    < end of copied text >

## 2019-09-10 NOTE — CONSULT NOTE ADULT - SUBJECTIVE AND OBJECTIVE BOX
MARGE BELLA  58y, Male  Allergy: sulfamethizole (Unknown)      CHIEF COMPLAINT: AMS (10 Sep 2019 01:02)      HPI:  57yo paraplegic M w/ PMH of HTN, chronic decubitus ulcer (follows Dr. Gooden in [facility]), R leg amputation p/w AMS. As per family, when they tried to pt up tonight, he was not waking up. Pt takes a lot of opioids and benzodiazepines as per home med hx. In ED, pt was still obtunded, not waking up. As per ED physician, pt woke up after 1st narcan push, but became obtunded again. Pt had a great relief after flumazenil push, waking up and alert. Pt reports having 3-4 pills of 2mg xanax at night. Pt was found to have SILVINA of cr of 5.5 (baseline 3.4). Elevated trop of 0.09 noted. VBG shows pH 7.09, CO2 42, HCO3 13. Bicarb drip started. Pt denies any fever/chills, CP, SOB, cough, abd pain, N/V/D, swelling, head trauma, or LOC. (10 Sep 2019 01:02)    FAMILY HISTORY:  No pertinent family history in first degree relatives    PAST MEDICAL & SURGICAL HISTORY:  Anemia  PAD (peripheral artery disease)  Hypertension  Suprapubic catheter  Pressure ulcer  Diabetes  Lumbar compression fracture  S/P below knee amputation, right  S/P debridement  Toe amputation status, right  H/O hernia repair      Substance Use (  ) never used  (  ) IVDU ( X ) Other:  Tobacco Usage:  (   ) never smoked   ( X  ) former smoker   (   ) current smoker   Alcohol Usage: (   ) social  (   ) daily use (  X ) denies  Sexual History: not relevant       ROS  UTO     VITALS:  T(F): 98.6, Max: 98.6 (19 @ 19:34)  HR: 78  BP: 114/60  RR: 14Vital Signs Last 24 Hrs  T(C): 37 (09 Sep 2019 19:34), Max: 37 (09 Sep 2019 19:34)  T(F): 98.6 (09 Sep 2019 19:34), Max: 98.6 (09 Sep 2019 19:34)  HR: 78 (09 Sep 2019 22:47) (78 - 88)  BP: 114/60 (09 Sep 2019 22:47) (87/55 - 114/60)  BP(mean): --  RR: 14 (09 Sep 2019 22:47) (14 - 16)  SpO2: 100% (09 Sep 2019 22:47) (94% - 100%)    PHYSICAL EXAM:  Gen: NAD, resting in bed  HEENT: Normocephalic, atraumatic  Neck: supple, no lymphadenopathy  CV: s1 s 2+  Lungs: clear   Abdomen: Soft, BS present. chronic sacral ulcer - clean. SPC in situ   Ext: Warm, well perfused. R BKA. Paraplegia   Neuro: non focal, lethargic - responds to sternal rub - opens eyes  Skin: no rash, no erythema    TESTS & MEASUREMENTS:                        7.9    13.85 )-----------( 294      ( 09 Sep 2019 20:45 )             25.8         142  |  117<H>  |  79<HH>  ----------------------------<  60<L>  5.9<H>   |  12<L>  |  5.5<HH>    Ca    8.3<L>      09 Sep 2019 20:45  Mg     1.5         TPro  5.7<L>  /  Alb  2.0<L>  /  TBili  <0.2  /  DBili  <0.2  /  AST  31  /  ALT  20  /  AlkPhos  170<H>      eGFR if Non African American: 11 mL/min/1.73M2 (19 @ 20:45)  eGFR if : 12 mL/min/1.73M2 (19 @ 20:45)    LIVER FUNCTIONS - ( 09 Sep 2019 20:45 )  Alb: 2.0 g/dL / Pro: 5.7 g/dL / ALK PHOS: 170 U/L / ALT: 20 U/L / AST: 31 U/L / GGT: x           Urinalysis Basic - ( 09 Sep 2019 20:34 )    Color: Blue / Appearance: Cloudy / S.025 / pH: x  Gluc: x / Ketone: Negative  / Bili: Moderate / Urobili: 0.2 mg/dL   Blood: x / Protein: >=300 mg/dL / Nitrite: Negative   Leuk Esterase: Small / RBC: 6-10 /HPF / WBC 10-25 /HPF   Sq Epi: x / Non Sq Epi: Few /HPF / Bacteria: TNTC /HPF          Blood Gas Venous - Lactate: 0.7 mmoL/L (09-10-19 @ 04:48)  Lactate, Blood: 0.7 mmol/L (19 @ 20:45)  Blood Gas Venous - Lactate: 1.0 mmoL/L (19 @ 20:06)      INFECTIOUS DISEASES TESTING  Hepatitis B Surface Antigen: Nonreact (19 @ 07:13)      RADIOLOGY & ADDITIONAL TESTS:  I have personally reviewed the last Chest xray  CXR      CT  CT Abdomen and Pelvis No Cont:   EXAM:  CT ABDOMEN AND PELVIS            PROCEDURE DATE:  2019            INTERPRETATION:  CLINICAL STATEMENT: Renal failure. UTI.      TECHNIQUE: Contiguous CT images were obtained of the abdomen and pelvis.  Intravenous Contrast: None.    Oral contrast was not administered.          COMPARISON:  CT abdomen pelvis dated 8/15/2018    FINDINGS:    Lack of intravenous and oral contrast degrades evaluation. Streak   artifact from spinal rods further degrades evaluation.    LOWER CHEST: Small bilateral pleural effusions with compressive   atelectasis. Interlobular septal thickening. Coronary artery, aortic and   mitral annular calcifications.    LIVER: Unremarkable.    SPLEEN: Unremarkable.    PANCREAS: Unremarkable.    GALLBLADDER AND BILIARY TREE: Unremarkable CT appearance of the   gallbladder. No biliary ductal dilatation.    ADRENALS: Unremarkable.    KIDNEYS: No hydronephrosis or evidence of ureteral calculus. Punctate 2   mm nonobstructing right renal calculi.    LYMPH NODES: Bilateral prominent pelvic lymph nodes redemonstrated, not   significantly changed    VASCULATURE: Normal caliber abdominal aorta with diffuse vascular   calcifications    BOWEL: No bowel obstruction. Unremarkable appendix. Underdistention   versus proximal ascending colon wall thickening.    PERITONEUM/RETROPERITONEUM/MESENTERY: There is no ascites or   pneumoperitoneum.    PELVIC VISCERA: Urinary bladder wall thickening. Bladder is collapsed   around a suprapubic catheter    BONES AND SOFT TISSUES: Chronic decubitus ulcers overlying bilateral   ischial tuberosities and sacrum with likely chronic osteitis. Chronic L1   compression fracture. Spinal rods. Muscle atrophy.    IMPRESSION:    Urinary bladder wall thickening despite collapsed around asuprapubic   catheter.  No hydronephrosis or evidence of ureteral calculus. Punctate   nonobstructing right renal calculi.    Small bilateral pleural effusions with compressive atelectasis.    Likely underdistention versus proximal ascending colonic wall thickening.   A mild colitis is not excluded.    Chronic decubitus ulcers with presumably chronic underlying osteitis.                    EDITA AMAYA M.D., ATTENDING RADIOLOGIST  This document has been electronically signed. Sep  9 2019 11:37PM             (19 @ 22:20)      CARDIOLOGY TESTING      MEDICATIONS  amLODIPine   Tablet 5  cefepime   IVPB 1000  chlorhexidine 4% Liquid 1  heparin  Injectable 5000  pantoprazole    Tablet 40  sodium bicarbonate  Infusion 0.456  vancomycin  IVPB 1000      ANTIBIOTICS:  cefepime   IVPB 1000 milliGRAM(s) IV Intermittent every 24 hours  vancomycin  IVPB 1000 milliGRAM(s) IV Intermittent every 24 hours MARGE BELLA  58y, Male  Allergy: sulfamethizole (Unknown)      CHIEF COMPLAINT: AMS (10 Sep 2019 01:02)      HPI:  57yo paraplegic M w/ PMH of HTN, chronic decubitus ulcer (follows Dr. Gooden in [facility]), R leg amputation p/w AMS. As per family, when they tried to pt up tonight, he was not waking up. Pt takes a lot of opioids and benzodiazepines as per home med hx. In ED, pt was still obtunded, not waking up. As per ED physician, pt woke up after 1st narcan push, but became obtunded again. Pt had a great relief after flumazenil push, waking up and alert. Pt reports having 3-4 pills of 2mg xanax at night. Pt was found to have SILVINA of cr of 5.5 (baseline 3.4). Elevated trop of 0.09 noted. VBG shows pH 7.09, CO2 42, HCO3 13. Bicarb drip started. Pt denies any fever/chills, CP, SOB, cough, abd pain, N/V/D, swelling, head trauma, or LOC. (10 Sep 2019 01:02)    FAMILY HISTORY:  No pertinent family history in first degree relatives    PAST MEDICAL & SURGICAL HISTORY:  Anemia  PAD (peripheral artery disease)  Hypertension  Suprapubic catheter  Pressure ulcer  Diabetes  Lumbar compression fracture  S/P below knee amputation, right  S/P debridement  Toe amputation status, right  H/O hernia repair      Substance Use (  ) never used  (  ) IVDU ( X ) Other:  Tobacco Usage:  (   ) never smoked   ( X  ) former smoker   (   ) current smoker   Alcohol Usage: (   ) social  (   ) daily use (  X ) denies  Sexual History: not relevant       ROS  UTO     VITALS:  T(F): 98.6, Max: 98.6 (19 @ 19:34)  HR: 78  BP: 114/60  RR: 14Vital Signs Last 24 Hrs  T(C): 37 (09 Sep 2019 19:34), Max: 37 (09 Sep 2019 19:34)  T(F): 98.6 (09 Sep 2019 19:34), Max: 98.6 (09 Sep 2019 19:34)  HR: 78 (09 Sep 2019 22:47) (78 - 88)  BP: 114/60 (09 Sep 2019 22:47) (87/55 - 114/60)  BP(mean): --  RR: 14 (09 Sep 2019 22:47) (14 - 16)  SpO2: 100% (09 Sep 2019 22:47) (94% - 100%)    PHYSICAL EXAM:  Gen: NAD, resting in bed  HEENT: Normocephalic, atraumatic  Neck: supple, no lymphadenopathy  CV: s1 s 2+  Lungs: clear   Abdomen: Soft, BS present. chronic sacral ulcers - clean. SPC in situ   Ext: Warm, well perfused. R BKA. Paraplegia. Left heel necrosis  Neuro: non focal, lethargic - responds to sternal rub - opens eyes  Skin: no rash, no erythema    TESTS & MEASUREMENTS:                        7.9    13.85 )-----------( 294      ( 09 Sep 2019 20:45 )             25.8         142  |  117<H>  |  79<HH>  ----------------------------<  60<L>  5.9<H>   |  12<L>  |  5.5<HH>    Ca    8.3<L>      09 Sep 2019 20:45  Mg     1.5         TPro  5.7<L>  /  Alb  2.0<L>  /  TBili  <0.2  /  DBili  <0.2  /  AST  31  /  ALT  20  /  AlkPhos  170<H>      eGFR if Non African American: 11 mL/min/1.73M2 (19 @ 20:45)  eGFR if : 12 mL/min/1.73M2 (19 @ 20:45)    LIVER FUNCTIONS - ( 09 Sep 2019 20:45 )  Alb: 2.0 g/dL / Pro: 5.7 g/dL / ALK PHOS: 170 U/L / ALT: 20 U/L / AST: 31 U/L / GGT: x           Urinalysis Basic - ( 09 Sep 2019 20:34 )    Color: Blue / Appearance: Cloudy / S.025 / pH: x  Gluc: x / Ketone: Negative  / Bili: Moderate / Urobili: 0.2 mg/dL   Blood: x / Protein: >=300 mg/dL / Nitrite: Negative   Leuk Esterase: Small / RBC: 6-10 /HPF / WBC 10-25 /HPF   Sq Epi: x / Non Sq Epi: Few /HPF / Bacteria: TNTC /HPF          Blood Gas Venous - Lactate: 0.7 mmoL/L (09-10-19 @ 04:48)  Lactate, Blood: 0.7 mmol/L (19 @ 20:45)  Blood Gas Venous - Lactate: 1.0 mmoL/L (19 @ 20:06)      INFECTIOUS DISEASES TESTING  Hepatitis B Surface Antigen: Nonreact (19 @ 07:13)      RADIOLOGY & ADDITIONAL TESTS:  I have personally reviewed the last Chest xray  CXR      CT  CT Abdomen and Pelvis No Cont:   EXAM:  CT ABDOMEN AND PELVIS            PROCEDURE DATE:  2019            INTERPRETATION:  CLINICAL STATEMENT: Renal failure. UTI.      TECHNIQUE: Contiguous CT images were obtained of the abdomen and pelvis.  Intravenous Contrast: None.    Oral contrast was not administered.          COMPARISON:  CT abdomen pelvis dated 8/15/2018    FINDINGS:    Lack of intravenous and oral contrast degrades evaluation. Streak   artifact from spinal rods further degrades evaluation.    LOWER CHEST: Small bilateral pleural effusions with compressive   atelectasis. Interlobular septal thickening. Coronary artery, aortic and   mitral annular calcifications.    LIVER: Unremarkable.    SPLEEN: Unremarkable.    PANCREAS: Unremarkable.    GALLBLADDER AND BILIARY TREE: Unremarkable CT appearance of the   gallbladder. No biliary ductal dilatation.    ADRENALS: Unremarkable.    KIDNEYS: No hydronephrosis or evidence of ureteral calculus. Punctate 2   mm nonobstructing right renal calculi.    LYMPH NODES: Bilateral prominent pelvic lymph nodes redemonstrated, not   significantly changed    VASCULATURE: Normal caliber abdominal aorta with diffuse vascular   calcifications    BOWEL: No bowel obstruction. Unremarkable appendix. Underdistention   versus proximal ascending colon wall thickening.    PERITONEUM/RETROPERITONEUM/MESENTERY: There is no ascites or   pneumoperitoneum.    PELVIC VISCERA: Urinary bladder wall thickening. Bladder is collapsed   around a suprapubic catheter    BONES AND SOFT TISSUES: Chronic decubitus ulcers overlying bilateral   ischial tuberosities and sacrum with likely chronic osteitis. Chronic L1   compression fracture. Spinal rods. Muscle atrophy.    IMPRESSION:    Urinary bladder wall thickening despite collapsed around asuprapubic   catheter.  No hydronephrosis or evidence of ureteral calculus. Punctate   nonobstructing right renal calculi.    Small bilateral pleural effusions with compressive atelectasis.    Likely underdistention versus proximal ascending colonic wall thickening.   A mild colitis is not excluded.    Chronic decubitus ulcers with presumably chronic underlying osteitis.                    EDITA AMAYA M.D., ATTENDING RADIOLOGIST  This document has been electronically signed. Sep  9 2019 11:37PM             (19 @ 22:20)      CARDIOLOGY TESTING      MEDICATIONS  amLODIPine   Tablet 5  cefepime   IVPB 1000  chlorhexidine 4% Liquid 1  heparin  Injectable 5000  pantoprazole    Tablet 40  sodium bicarbonate  Infusion 0.456  vancomycin  IVPB 1000      ANTIBIOTICS:  cefepime   IVPB 1000 milliGRAM(s) IV Intermittent every 24 hours  vancomycin  IVPB 1000 milliGRAM(s) IV Intermittent every 24 hours

## 2019-09-10 NOTE — CONSULT NOTE ADULT - PROBLEM SELECTOR RECOMMENDATION 9
s/p overdose  incr wbc - reactive  NO fevers  Avoid sedation   Check MRSA pcr - nasal  DC all abx - pan cx if spikes or increasing wbc off abx     Chronic sacral osteo - NO reason for abx for now

## 2019-09-10 NOTE — ED PROVIDER NOTE - ATTENDING CONTRIBUTION TO CARE
I personally evaluated the patient. I reviewed the Resident’s or Physician Assistant’s note (as assigned above), and agree with the findings and plan except as documented in my note.  57yo paraplegic  -  1984 w/ PMH of HTN, chronic decubitus ulcer (follows Dr. Gooden in [facility]), R BKA amputation 6 mnths ago after gangrene Low extremity,  had  tranfusion for hgb of 6 a tthat time -  baseline around 8, CKD   - no nephrologist ,  suprapubic catheter p/w AMS. As per family, when they tried to pt up tonight, he was not waking up. Pt takes a lot of opioids and benzodiazepines as per home med hx. In ED, pt was still obtunded, not waking up. no trauma or fall.  drowsy , pale,nontoxic, perrl eomi, mmdry  CVS RRR, Resp CTA no tachypnea , Abd soft nontender   nondistended  suprapubic ,  sacral decubiti -  stage 4   no purulence no erythema

## 2019-09-10 NOTE — ED PROVIDER NOTE - OBJECTIVE STATEMENT
58 year old male brought in by ambulance and family for increasing confusion and decrease responsiveness. As per family patient wouldn't really wake up tonight and was concerned he took a lot of his pain and anxiety meds at home.

## 2019-09-10 NOTE — PROGRESS NOTE ADULT - SUBJECTIVE AND OBJECTIVE BOX
Patient is awake and alert following repeat dose of flumazenil given today. He reports he took "extra" Xanax, he denies suicidal Ideation      T(F): 95 (09-10-19 @ 11:00), Max: 98.6 (09-09-19 @ 19:34)  HR: 67 (09-10-19 @ 13:06)  BP: 95/52 (09-10-19 @ 13:06)  RR: 14 (09-10-19 @ 13:06)  SpO2: 100% (09-10-19 @ 13:06) (94% - 100%)    PHYSICAL EXAM:  GENERAL: NAD  HEAD:  Atraumatic, Normocephalic  NERVOUS SYSTEM:  quadriplegia   CHEST/LUNG: Clear to percussion bilaterally; No rales, rhonchi, wheezing, or rubs  HEART: Regular rate and rhythm; No murmurs, rubs, or gallops  ABDOMEN: Soft, Nontender, suprapubic catheter   EXTREMITIES:  R BKA  sacral decubiti     LABS  09-10    141  |  115<H>  |  76<HH>  ----------------------------<  143<H>  5.2<H>   |  15<L>  |  5.1<HH>    Ca    7.8<L>      10 Sep 2019 05:30  Phos  9.2     09-10  Mg     1.4     09-10    TPro  5.4<L>  /  Alb  2.0<L>  /  TBili  <0.2  /  DBili  x   /  AST  34  /  ALT  19  /  AlkPhos  160<H>  09-10                          7.2    15.35 )-----------( 295      ( 10 Sep 2019 05:30 )             23.9     PT/INR - ( 09 Sep 2019 20:45 )   PT: 14.50 sec;   INR: 1.26 ratio         PTT - ( 09 Sep 2019 20:45 )  PTT:38.8 sec    CARDIAC ENZYMES      Troponin T, Serum: 0.08 ng/mL (09-10-19 @ 05:30)  Troponin T, Serum: 0.09 ng/mL (09-09-19 @ 20:45)        RADIOLOGY    MEDICATIONS  (STANDING):  amLODIPine   Tablet 5 milliGRAM(s) Oral Once  cefepime   IVPB 1000 milliGRAM(s) IV Intermittent every 24 hours  chlorhexidine 4% Liquid 1 Application(s) Topical two times a day  heparin  Injectable 5000 Unit(s) SubCutaneous every 8 hours  pantoprazole    Tablet 40 milliGRAM(s) Oral before breakfast  silver sulfADIAZINE 1% Cream 1 Application(s) Topical two times a day  sodium bicarbonate  Infusion 0.456 mEq/kG/Hr (200 mL/Hr) IV Continuous <Continuous>    MEDICATIONS  (PRN):

## 2019-09-10 NOTE — H&P ADULT - HISTORY OF PRESENT ILLNESS
Acute Low Back Pain: Exercises  Introduction  Here are some examples of typical rehabilitation exercises for your condition. Start each exercise slowly. Ease off the exercise if you start to have pain. Your doctor or physical therapist will tell you when you can start these exercises and which ones will work best for you. When you are not being active, find a comfortable position for rest. Some people are comfortable on the floor or a medium-firm bed with a small pillow under their head and another under their knees. Some people prefer to lie on their side with a pillow between their knees. Don't stay in one position for too long. Take short walks (10 to 20 minutes) every 2 to 3 hours. Avoid slopes, hills, and stairs until you feel better. Walk only distances you can manage without pain, especially leg pain. How to do the exercises  Back stretches    1. Get down on your hands and knees on the floor. 2. Relax your head and allow it to droop. Round your back up toward the ceiling until you feel a nice stretch in your upper, middle, and lower back. Hold this stretch for as long as it feels comfortable, or about 15 to 30 seconds. 3. Return to the starting position with a flat back while you are on your hands and knees. 4. Let your back sway by pressing your stomach toward the floor. Lift your buttocks toward the ceiling. 5. Hold this position for 15 to 30 seconds. 6. Repeat 2 to 4 times. Follow-up care is a key part of your treatment and safety. Be sure to make and go to all appointments, and call your doctor if you are having problems. It's also a good idea to know your test results and keep a list of the medicines you take. Where can you learn more? Go to http://daniela-monie.info/. Enter I371 in the search box to learn more about \"Acute Low Back Pain: Exercises. \"  Current as of: September 20, 2018  Content Version: 12.1  © 8430-1332 [employer].  Care instructions adapted under license by [employer] (which disclaims liability or warranty for this information). If you have questions about a medical condition or this instruction, always ask your healthcare professional. [address] any warranty or liability for your use of this information. 57yo paraplegic M w/ PMH of HTN, DM, chronic decubitus ulcer (follows Dr. Gooden in [facility]), R leg amputation p/w AMS. As per family, when they tried to pt up tonight, he was not waking up.     Pt denies any fever/chills, CP, SOB, cough, abd pain, N/V/D, swelling, head trauma, or LOC. 57yo paraplegic M w/ PMH of HTN, chronic decubitus ulcer (follows Dr. Gooden in [facility]), R leg amputation p/w AMS. As per family, when they tried to pt up tonight, he was not waking up. Pt takes a lot of opioids and benzodiazepines as per home med hx. In ED, pt was still obtunded, not waking up. As per ED physician, pt woke up after 1st narcan push, but became obtunded again. Pt had a great relief after flumazenil push, waking up and alert. Pt reports having 3-4 pills of 2mg xanax at night. Pt was found to have SILVINA of cr of 5.5 (baseline 3.4). Elevated trop of 0.09 noted. VBG shows pH 7.09, CO2 42, HCO3 13. Bicarb drip started. Pt denies any fever/chills, CP, SOB, cough, abd pain, N/V/D, swelling, head trauma, or LOC.

## 2019-09-10 NOTE — ED PROVIDER NOTE - CLINICAL SUMMARY MEDICAL DECISION MAKING FREE TEXT BOX
Pt on  opiods  bzd,  paraplegic, ckd  pw  ams,  drowsiness.   in ED  afebrile rectally,  hypotensive responded well to IVF -  per family pt not  eating as much 2.2  swallowing difficutly,  WBC 13.8 -  CT head for  ams -  negative -  suprapubic cath with  +UTI,  changed , new  UA sent -  CTa/p  added for increased  crea  3.3  baseline  tp 5/5 crea ( gfr 12 from 22) -    CT ab pelvis   no obstruction - no infected  stone  + cystitis. Pt  with Non AG  acidosis  ph 7.09  hco3 12 ,  pco2 42 -  Nahco3 drip given -   ( also with K 5.5 - no ekg changes)  ,  pt  hypoglycemic  dextrose g iven,   Pt  given  .12  narcan  x 2  in ED  - more awake -  but returned to drowsiness.  ICU consulted  -  ad bedside  intensivist  gave flumazenil - with  great response  pt  awake alert   -  pt admitted to ICU Pt on  opiods  bzd,  paraplegic, ckd  pw  ams,  drowsiness.   in ED  afebrile rectally,  hypotensive responded well to IVF -  per family pt not  eating as much 2.2  swallowing difficutly,  WBC 13.8 -  CT head for  ams -  negative -  suprapubic cath with  +UTI,  changed , new  UA sent -  CTa/p  added for increased  crea  3.3  baseline  tp 5/5 crea ( gfr 12 from 22) -    CT ab pelvis   no obstruction - no infected  stone  + cystitis. Pt  with Non AG  acidosis  ph 7.09  hco3 12 ,  pco2 42 -  Nahco3 drip given -   ( also with K 5.5 - no ekg changes)  ,  pt  hypoglycemic  dextrose g iven,  abx given for UTI,  and  clinda  for  possible right lower lobe opacity -  given aspiration risk  Pt  given  .12  narcan  x 2  in ED  - more awake -  but returned to drowsiness.  ICU consulted  -  ad bedside  intensivist  gave flumazenil - with  great response  pt  awake alert   -  pt admitted to ICU

## 2019-09-10 NOTE — ED PROVIDER NOTE - CARE PLAN
Principal Discharge DX:	Overdose  Secondary Diagnosis:	Acute on chronic renal failure  Secondary Diagnosis:	Hyperkalemia  Secondary Diagnosis:	Acidosis

## 2019-09-10 NOTE — CONSULT NOTE ADULT - ASSESSMENT
IMPRESSION:  alter mental status secondary to drug over dose opoid   SILVINA on CKD   metabolic acidosis   anemia stable     PLAN:    CNS: no sedation for now     HEENT: oral care     PULMONARY: keep pox > 92 %     CARDIOVASCULAR: echo continue IV fluid bicarb continue     GI: GI prophylaxis.  Feeding     RENAL: follow lytes renal US   renal consult       INFECTIOUS DISEASE: pan cx     HEMATOLOGICAL:  DVT prophylaxis.    ENDOCRINE:  Follow up FS.  Insulin protocol if needed.    MUSCULOSKELETAL:        CRITICAL CARE TIME SPENT: *** IMPRESSION:  alter mental status secondary to drug over dose opoid   SILVINA on CKD   metabolic acidosis   anemia stable     PLAN:    CNS: no sedation for now     HEENT: oral care     PULMONARY: keep pox > 92 % do abg now check CO2 level and PH     CARDIOVASCULAR: echo, continue IV fluid bicarb continue for now   Follow CE  GI: GI prophylaxis.  Feeding     RENAL: follow lytes renal US   renal consult       INFECTIOUS DISEASE: pan cx follow ID , d/c vanco now   burn follow up   HEMATOLOGICAL:  DVT prophylaxis.    ENDOCRINE:  Follow up FS.  Insulin protocol if needed.    MUSCULOSKELETAL:        CRITICAL CARE TIME SPENT: *** IMPRESSION:  alter mental status secondary to drug over dose opoid   SILVINA on CKD   metabolic acidosis   anemia stable     PLAN:    CNS: no sedation for now     HEENT: oral care     PULMONARY: keep pox > 92 % do abg now check CO2 level and PH now arousable   keep end tidal CO2 low threshold for intubation     CARDIOVASCULAR: echo, continue IV fluid bicarb continue for now   Follow CE  GI: GI prophylaxis.  Feeding     RENAL: follow lytes renal US   renal consult       INFECTIOUS DISEASE: pan cx follow ID , d/c vanco now   burn follow up   HEMATOLOGICAL:  DVT prophylaxis.    ENDOCRINE:  Follow up FS.  Insulin protocol if needed.    MUSCULOSKELETAL:        CRITICAL CARE TIME SPENT: ***

## 2019-09-10 NOTE — ED PROVIDER NOTE - PHYSICAL EXAMINATION
Physical Exam    I am unable to obtain a comprehensive history, review of systems, past medical history, and/or physical exam due to constraints imposed by the urgency of the patient's clinical condition and/or mental status.     Vital Signs: I have reviewed the initial vital signs.  Constitutional: Chronically ill appearing, mod acute distress  Eyes: Conjunctiva pale, Sclera clear, PERRLA, EOMI.  Cardiovascular: S1 and S2, regular rate, regular rhythm, well-perfused extremities, radial pulses equal and 2+  Respiratory: unlabored respiratory effort, clear to auscultation bilaterally no wheezing, rales and rhonchi  Gastrointestinal: soft, non-tender abdomen, no pulsatile mass, normal bowl sounds + suprapubic tube in place  Musculoskeletal: supple neck, +Right BKA  Integumentary: warm, dry, +large sacral stage 4 decubit  Neurologic: patient obtunded

## 2019-09-10 NOTE — H&P ADULT - NSHPPHYSICALEXAM_GEN_ALL_CORE
ICU Vital Signs Last 24 Hrs  T(C): 37 (09 Sep 2019 19:34), Max: 37 (09 Sep 2019 19:34)  T(F): 98.6 (09 Sep 2019 19:34), Max: 98.6 (09 Sep 2019 19:34)  HR: 78 (09 Sep 2019 22:47) (78 - 88)  BP: 114/60 (09 Sep 2019 22:47) (87/55 - 114/60)  BP(mean): --  ABP: --  ABP(mean): --  RR: 14 (09 Sep 2019 22:47) (14 - 16)  SpO2: 100% (09 Sep 2019 22:47) (94% - 100%)        Physical Examination:    General: Pt awake and alert.     HEENT: Pupils equal, reactive to light.  Symmetric.    PULM: Clear to auscultation bilaterally, no significant sputum production    CVS: Regular rate and rhythm, no murmurs, rubs, or gallops    ABD: Soft, nondistended, nontender, normoactive bowel sounds, no masses    EXT: No edema, nontender    SKIN: + decubitus ulcer w/o pus noted    Neuro: AAO x 4

## 2019-09-10 NOTE — CONSULT NOTE ADULT - SUBJECTIVE AND OBJECTIVE BOX
Patient is a 58y old  Male who presents with a chief complaint of AMS (10 Sep 2019 05:40)      HPI:  59yo paraplegic M w/ PMH of HTN, chronic decubitus ulcer (follows Dr. Gooden in [facility]), R leg amputation p/w AMS. As per family, when they tried to pt up tonight, he was not waking up. Pt takes a lot of opioids and benzodiazepines as per home med hx. In ED, pt was still obtunded, not waking up. As per ED physician, pt woke up after 1st narcan push, but became obtunded again. Pt had a great relief after flumazenil push, waking up and alert. Pt reports having 3-4 pills of 2mg xanax at night. Pt was found to have SILVINA of cr of 5.5 (baseline 3.4). Elevated trop of 0.09 noted. VBG shows pH 7.09, CO2 42, HCO3 13. Bicarb drip started. Pt denies any fever/chills, CP, SOB, cough, abd pain, N/V/D, swelling, head trauma, or LOC. (10 Sep 2019 01:02)      PAST MEDICAL & SURGICAL HISTORY:  Anemia  PAD (peripheral artery disease)  Hypertension  Suprapubic catheter  Pressure ulcer  Diabetes  Lumbar compression fracture  S/P below knee amputation, right  S/P debridement  Toe amputation status, right  H/O hernia repair    Allergies    sulfamethizole (Unknown)    Intolerances      Family history : no cardiovscular family history   Home Medications:  Dolophine 10 mg oral tablet: 4 tab(s) orally 3 times a day (10 Sep 2019 01:13)  gabapentin 800 mg oral tablet: 1 tab(s) orally 3 times a day (10 Sep 2019 01:13)  oxyCODONE 30 mg oral tablet: 1 tab(s) orally 4 times a day (10 Sep 2019 01:13)  sodium hypochlorite 0.25% topical solution: 1 application topically 2 times a day (10 Sep 2019 01:13)  Testim 1% (50 mg/5 g) transdermal gel: 1 packet(s) transdermal once a day (in the morning) (10 Sep 2019 01:13)  Valium 10 mg oral tablet: orally 2 times a day (10 Sep 2019 01:13)  Xanax 2 mg oral tablet: orally once (at bedtime) (10 Sep 2019 01:13)    Occupation:  AlDayjetol: Denied  Smoking: Denied  Drug Use: Denied  Marital Status:         ROS: as in HPI; All other systems reviewed are negative    ICU Vital Signs Last 24 Hrs  T(C): 35.5 (10 Sep 2019 07:01), Max: 37 (09 Sep 2019 19:34)  T(F): 95.9 (10 Sep 2019 07:01), Max: 98.6 (09 Sep 2019 19:34)  HR: 77 (10 Sep 2019 06:06) (77 - 88)  BP: 91/54 (10 Sep 2019 06:06) (87/55 - 114/60)  BP(mean): 66 (10 Sep 2019 06:06) (64 - 77)  ABP: --  ABP(mean): --  RR: 16 (10 Sep 2019 07:) (10 - 16)  SpO2: 99% (10 Sep 2019 06:06) (94% - 100%)        Physical Examination:    General: No acute distress.  Alert, oriented, interactive, nonfocal    HEENT: Pupils equal, reactive to light.  Symmetric.    PULM: Clear to auscultation bilaterally, no significant sputum production    CVS: Regular rate and rhythm, no murmurs, rubs, or gallops    ABD: Soft, nondistended, nontender, normoactive bowel sounds, no masses    EXT: No edema, nontender, no clubbing     SKIN: Warm and well perfused, no rashes noted.    Neurology : no motor or sensory deficit     Musculoskeletal : move all extremity     Lymphatic system: no Palpable node           ABG - ( 10 Sep 2019 03:47 )  pH, Arterial: 7.08  pH, Blood: x     /  pCO2: 52    /  pO2: 90    / HCO3: 15    / Base Excess: -14.0 /  SaO2: 95                  I&O's Detail    09 Sep 2019 07:01  -  10 Sep 2019 07:00  --------------------------------------------------------  IN:    IV PiggyBack: 250 mL    sodium bicarbonate  Infusion: 1000 mL  Total IN: 1250 mL    OUT:    Indwelling Catheter - Urethral: 285 mL  Total OUT: 285 mL    Total NET: 965 mL      10 Sep 2019 07:01  -  10 Sep 2019 08:24  --------------------------------------------------------  IN:  Total IN: 0 mL    OUT:    Indwelling Catheter - Urethral: 30 mL  Total OUT: 30 mL    Total NET: -30 mL            LABS:                        7.2    15.35 )-----------( 295      ( 10 Sep 2019 05:30 )             23.9     10 Sep 2019 05:30    141    |  115    |  76     ----------------------------<  143    5.2     |  15     |  5.1      Ca    7.8        10 Sep 2019 05:30  Phos  9.2       10 Sep 2019 05:30  Mg     1.4       10 Sep 2019 05:30    TPro  5.4    /  Alb  2.0    /  TBili  <0.2   /  DBili  x      /  AST  34     /  ALT  19     /  AlkPhos  160    10 Sep 2019 05:30  Amylase x     lipase x          CARDIAC MARKERS ( 10 Sep 2019 05:30 )  x     / 0.08 ng/mL / x     / x     / x      CARDIAC MARKERS ( 09 Sep 2019 20:45 )  x     / 0.09 ng/mL / x     / x     / x          CAPILLARY BLOOD GLUCOSE      POCT Blood Glucose.: 124 mg/dL (10 Sep 2019 00:06)    PT/INR - ( 09 Sep 2019 20:45 )   PT: 14.50 sec;   INR: 1.26 ratio         PTT - ( 09 Sep 2019 20:45 )  PTT:38.8 sec  Urinalysis Basic - ( 09 Sep 2019 20:34 )    Color: Blue / Appearance: Cloudy / S.025 / pH: x  Gluc: x / Ketone: Negative  / Bili: Moderate / Urobili: 0.2 mg/dL   Blood: x / Protein: >=300 mg/dL / Nitrite: Negative   Leuk Esterase: Small / RBC: 6-10 /HPF / WBC 10-25 /HPF   Sq Epi: x / Non Sq Epi: Few /HPF / Bacteria: TNTC /HPF      Culture    Lactate, Blood: 0.7 mmol/L (09-10-19 @ 05:30)  Lactate, Blood: 0.7 mmol/L (19 @ 20:45)      MEDICATIONS  (STANDING):  amLODIPine   Tablet 5 milliGRAM(s) Oral Once  cefepime   IVPB 1000 milliGRAM(s) IV Intermittent every 24 hours  chlorhexidine 4% Liquid 1 Application(s) Topical two times a day  heparin  Injectable 5000 Unit(s) SubCutaneous every 8 hours  pantoprazole    Tablet 40 milliGRAM(s) Oral before breakfast  sodium bicarbonate  Infusion 0.456 mEq/kG/Hr (200 mL/Hr) IV Continuous <Continuous>  vancomycin  IVPB 1000 milliGRAM(s) IV Intermittent every 24 hours    MEDICATIONS  (PRN):        RADIOLOGY: ***   < from: CT Abdomen and Pelvis No Cont (19 @ 22:20) >    Urinary bladder wall thickening despite collapsed around asuprapubic   catheter.  No hydronephrosis or evidence of ureteral calculus. Punctate   nonobstructing right renal calculi.    Small bilateral pleural effusions with compressive atelectasis.    Likely underdistention versus proximal ascending colonic wall thickening.   A mild colitis is not excluded.    Chronic decubitus ulcers with presumably chronic underlying osteitis.    < end of copied text >  < from: CT Head No Cont (19 @ 21:03) >  No CT evidence of acute intracranial pathology.    Chronic microvascular ischemic changes.    < end of copied text >    CXR:  TLC:  OG:  ET tube:        CAM ICU:  ECHO: Patient is a 58y old  Male who presents with a chief complaint of AMS (10 Sep 2019 05:40)      HPI:  57yo paraplegic M w/ PMH of HTN, chronic decubitus ulcer (follows Dr. Gooden in [facility]), R leg amputation p/w AMS. As per family, when they tried to pt up tonight, he was not waking up. Pt takes a lot of opioids and benzodiazepines as per home med hx. In ED, pt was still obtunded, not waking up. As per ED physician, pt woke up after 1st narcan push, but became obtunded again. Pt had a great relief after flumazenil push, waking up and alert. Pt reports having 3-4 pills of 2mg xanax at night. Pt was found to have SILVINA of cr of 5.5 (baseline 3.4). Elevated trop of 0.09 noted. VBG shows pH 7.09, CO2 42, HCO3 13. Bicarb drip started. Pt denies any fever/chills, CP, SOB, cough, abd pain, N/V/D, swelling, head trauma, or LOC. (10 Sep 2019 01:02)      PAST MEDICAL & SURGICAL HISTORY:  Anemia  PAD (peripheral artery disease)  Hypertension  Suprapubic catheter  Pressure ulcer  Diabetes  Lumbar compression fracture  S/P below knee amputation, right  S/P debridement  Toe amputation status, right  H/O hernia repair    Allergies    sulfamethizole (Unknown)    Intolerances      Family history : no cardiovscular family history   Home Medications:  Dolophine 10 mg oral tablet: 4 tab(s) orally 3 times a day (10 Sep 2019 01:13)  gabapentin 800 mg oral tablet: 1 tab(s) orally 3 times a day (10 Sep 2019 01:13)  oxyCODONE 30 mg oral tablet: 1 tab(s) orally 4 times a day (10 Sep 2019 01:13)  sodium hypochlorite 0.25% topical solution: 1 application topically 2 times a day (10 Sep 2019 01:13)  Testim 1% (50 mg/5 g) transdermal gel: 1 packet(s) transdermal once a day (in the morning) (10 Sep 2019 01:13)  Valium 10 mg oral tablet: orally 2 times a day (10 Sep 2019 01:13)  Xanax 2 mg oral tablet: orally once (at bedtime) (10 Sep 2019 01:13)    Occupation:  Alochol: Denied  Smoking: Denied  Drug Use: Denied  Marital Status:         ROS: as in HPI; All other systems reviewed are negative    ICU Vital Signs Last 24 Hrs  T(C): 35.5 (10 Sep 2019 07:01), Max: 37 (09 Sep 2019 19:34)  T(F): 95.9 (10 Sep 2019 07:01), Max: 98.6 (09 Sep 2019 19:34)  HR: 77 (10 Sep 2019 06:06) (77 - 88)  BP: 91/54 (10 Sep 2019 06:06) (87/55 - 114/60)  BP(mean): 66 (10 Sep 2019 06:06) (64 - 77)  ABP: --  ABP(mean): --  RR: 16 (10 Sep 2019 07:) (10 - 16)  SpO2: 99% (10 Sep 2019 06:06) (94% - 100%)        Physical Examination:    General: arousable follow command but sleepy     HEENT: Pupils equal, reactive to light.  Symmetric.    PULM: Clear to auscultation bilaterally, no significant sputum production    CVS: Regular rate and rhythm, no murmurs, rubs, or gallops    ABD: Soft, nondistended, nontender, normoactive bowel sounds, no masses    EXT: No edema, nontender, no clubbing Right Knee amputation     SKIN: Warm and well perfused, no rashes noted.    Neurology : no motor or sensory deficit     Musculoskeletal : move all extremity     Lymphatic system: no Palpable node           ABG - ( 10 Sep 2019 03:47 )  pH, Arterial: 7.08  pH, Blood: x     /  pCO2: 52    /  pO2: 90    / HCO3: 15    / Base Excess: -14.0 /  SaO2: 95                  I&O's Detail    09 Sep 2019 07:01  -  10 Sep 2019 07:00  --------------------------------------------------------  IN:    IV PiggyBack: 250 mL    sodium bicarbonate  Infusion: 1000 mL  Total IN: 1250 mL    OUT:    Indwelling Catheter - Urethral: 285 mL  Total OUT: 285 mL    Total NET: 965 mL      10 Sep 2019 07:01  -  10 Sep 2019 08:24  --------------------------------------------------------  IN:  Total IN: 0 mL    OUT:    Indwelling Catheter - Urethral: 30 mL  Total OUT: 30 mL    Total NET: -30 mL            LABS:                        7.2    15.35 )-----------( 295      ( 10 Sep 2019 05:30 )             23.9     10 Sep 2019 05:30    141    |  115    |  76     ----------------------------<  143    5.2     |  15     |  5.1      Ca    7.8        10 Sep 2019 05:30  Phos  9.2       10 Sep 2019 05:30  Mg     1.4       10 Sep 2019 05:30    TPro  5.4    /  Alb  2.0    /  TBili  <0.2   /  DBili  x      /  AST  34     /  ALT  19     /  AlkPhos  160    10 Sep 2019 05:30  Amylase x     lipase x          CARDIAC MARKERS ( 10 Sep 2019 05:30 )  x     / 0.08 ng/mL / x     / x     / x      CARDIAC MARKERS ( 09 Sep 2019 20:45 )  x     / 0.09 ng/mL / x     / x     / x          CAPILLARY BLOOD GLUCOSE      POCT Blood Glucose.: 124 mg/dL (10 Sep 2019 00:06)    PT/INR - ( 09 Sep 2019 20:45 )   PT: 14.50 sec;   INR: 1.26 ratio         PTT - ( 09 Sep 2019 20:45 )  PTT:38.8 sec  Urinalysis Basic - ( 09 Sep 2019 20:34 )    Color: Blue / Appearance: Cloudy / S.025 / pH: x  Gluc: x / Ketone: Negative  / Bili: Moderate / Urobili: 0.2 mg/dL   Blood: x / Protein: >=300 mg/dL / Nitrite: Negative   Leuk Esterase: Small / RBC: 6-10 /HPF / WBC 10-25 /HPF   Sq Epi: x / Non Sq Epi: Few /HPF / Bacteria: TNTC /HPF      Culture    Lactate, Blood: 0.7 mmol/L (09-10-19 @ 05:30)  Lactate, Blood: 0.7 mmol/L (19 @ 20:45)      MEDICATIONS  (STANDING):  amLODIPine   Tablet 5 milliGRAM(s) Oral Once  cefepime   IVPB 1000 milliGRAM(s) IV Intermittent every 24 hours  chlorhexidine 4% Liquid 1 Application(s) Topical two times a day  heparin  Injectable 5000 Unit(s) SubCutaneous every 8 hours  pantoprazole    Tablet 40 milliGRAM(s) Oral before breakfast  sodium bicarbonate  Infusion 0.456 mEq/kG/Hr (200 mL/Hr) IV Continuous <Continuous>  vancomycin  IVPB 1000 milliGRAM(s) IV Intermittent every 24 hours    MEDICATIONS  (PRN):        RADIOLOGY: ***   < from: CT Abdomen and Pelvis No Cont (19 @ 22:20) >    Urinary bladder wall thickening despite collapsed around asuprapubic   catheter.  No hydronephrosis or evidence of ureteral calculus. Punctate   nonobstructing right renal calculi.    Small bilateral pleural effusions with compressive atelectasis.    Likely underdistention versus proximal ascending colonic wall thickening.   A mild colitis is not excluded.    Chronic decubitus ulcers with presumably chronic underlying osteitis.    < end of copied text >  < from: CT Head No Cont (19 @ 21:03) >  No CT evidence of acute intracranial pathology.    Chronic microvascular ischemic changes.    < end of copied text >    CXR:  TLC:  OG:  ET tube:        CAM ICU:  ECHO:

## 2019-09-10 NOTE — PHARMACOTHERAPY INTERVENTION NOTE - COMMENTS
As per Md aware of allergy to sulfa. as per MD patient has used silvadene in the past without a problem.
md aware patient received total of 1mg dose already, wants another 0.5mg

## 2019-09-10 NOTE — PATIENT PROFILE ADULT - NSPRONUTRITIONRISK_GEN_A_NUR
Unintentional weight loss greater than 10 percent Pressure injury stage 2 or greater/Unintentional weight loss greater than 10 percent

## 2019-09-11 NOTE — CONSULT NOTE ADULT - SUBJECTIVE AND OBJECTIVE BOX
CARDIOLOGY CONSULT NOTE     CHIEF COMPLAINT/REASON FOR CONSULT:    HPI:  57yo paraplegic M w/ PMH of HTN, chronic decubitus ulcer (follows Dr. Gooden in [facility]), R leg amputation p/w AMS. As per family, when they tried to get  pt up last  tonight, he was not waking up. Pt takes a lot of opioids and benzodiazepines as per home med hx. In ED, pt was still obtunded, not waking up. As per ED physician, pt woke up after 1st narcan push, but became obtunded again. Pt had a great relief after flumazenil push, waking up and alert. Pt reports having 3-4 pills of 2mg xanax at night. Pt was found to have SILVINA of cr of 5.5 (baseline 3.4). Elevated trop of 0.09 noted. VBG shows pH 7.09, CO2 42, HCO3 13. Bicarb drip started. Pt denies any fever/chills, CP, SOB, cough, abd pain, N/V/D, swelling, head trauma, or LOC. (10 Sep 2019 01:02)      PAST MEDICAL & SURGICAL HISTORY:  Anemia  PAD (peripheral artery disease)  Hypertension  Suprapubic catheter  Pressure ulcer  Diabetes  Lumbar compression fracture  S/P below knee amputation, right  S/P debridement  Toe amputation status, right  H/O hernia repair      Cardiac Risks:   [x ]HTN, [x ] DM, [x ] Smoking, [ ] FH,  [ ] Lipids        MEDICATIONS:  MEDICATIONS  (STANDING):  cefepime   IVPB 1000 milliGRAM(s) IV Intermittent every 24 hours  chlorhexidine 4% Liquid 1 Application(s) Topical two times a day  dextrose 5% + sodium chloride 0.45%. 1000 milliLiter(s) (75 mL/Hr) IV Continuous <Continuous>  heparin  Injectable 5000 Unit(s) SubCutaneous every 8 hours  pantoprazole    Tablet 40 milliGRAM(s) Oral before breakfast  silver sulfADIAZINE 1% Cream 1 Application(s) Topical two times a day  sodium chloride 0.9% Bolus 500 milliLiter(s) IV Bolus once      FAMILY HISTORY:  No pertinent family history in first degree relatives      SOCIAL HISTORY:      [ ] Marital status  Divourced  Allergies    sulfamethizole (Unknown)      	    REVIEW OF SYSTEMS:  CONSTITUTIONAL: No fever, weight loss, or fatigue  EYES: No eye pain, visual disturbances, or discharge  ENMT:  No difficulty hearing, tinnitus, vertigo; No sinus or throat pain  NECK: No pain or stiffness  RESPIRATORY: No cough, wheezing, chills or hemoptysis; No Shortness of Breath  CARDIOVASCULAR: No chest pain, palpitations, passing out, dizziness, or leg swelling  GASTROINTESTINAL: No abdominal or epigastric pain. No nausea, vomiting, or hematemesis; No diarrhea or constipation. No melena or hematochezia.  GENITOURINARY: No dysuria, frequency, hematuria, or incontinence  NEUROLOGICAL: No headaches, memory loss, loss of strength, numbness, or tremors  SKIN: No itching, burning, rashes, or lesions   	        PHYSICAL EXAM:  T(C): 35.3 (09-11-19 @ 07:01), Max: 35.8 (09-10-19 @ 23:07)  HR: 78 (09-11-19 @ 07:00) (63 - 82)  BP: 75/44 (09-11-19 @ 07:00) (72/45 - 103/54)  RR: 16 (09-11-19 @ 07:01) (10 - 28)  SpO2: 99% (09-11-19 @ 07:00) (95% - 100%)  Wt(kg): --  I&O's Summary    10 Sep 2019 07:01  -  11 Sep 2019 07:00  --------------------------------------------------------  IN: 3440 mL / OUT: 910 mL / NET: 2530 mL    11 Sep 2019 07:01  -  11 Sep 2019 08:29  --------------------------------------------------------  IN: 0 mL / OUT: 60 mL / NET: -60 mL        Appearance: Normal	  Psychiatry: A & O x 3, Mood & affect appropriate  HEENT:   Normal oral mucosa, PERRL, EOMI	  Lymphatic: No lymphadenopathy  Cardiovascular: Normal S1 S2,RRR, No JVD, No murmurs  Respiratory: Lungs clear to auscultation	  Gastrointestinal:  Soft, Non-tender, + BS	  Skin: No rashes, No ecchymoses, No cyanosis	  Neurologic: Non-focal  Extremities: Normal range of motion, No clubbing, cyanosis or edema  Vascular: Peripheral pulses palpable 2+ bilaterally      ECG:  	not available    	  LABS:	 	    CARDIAC MARKERS:                                    7.2    15.35 )-----------( 295      ( 10 Sep 2019 05:30 )             23.9     09-10    141  |  110  |  74<HH>  ----------------------------<  60<L>  4.5   |  18  |  4.8<HH>    Ca    7.2<L>      10 Sep 2019 19:31  Phos  9.2     09-10  Mg     1.4     09-10    TPro  5.4<L>  /  Alb  2.0<L>  /  TBili  <0.2  /  DBili  x   /  AST  34  /  ALT  19  /  AlkPhos  160<H>  09-10    PT/INR - ( 09 Sep 2019 20:45 )   PT: 14.50 sec;   INR: 1.26 ratio         PTT - ( 09 Sep 2019 20:45 )  PTT:38.8 sec

## 2019-09-11 NOTE — PROGRESS NOTE ADULT - SUBJECTIVE AND OBJECTIVE BOX
Patient is a 58y old  Male who presents with a chief complaint of AMS (11 Sep 2019 08:53)      INTERVAL HPI/OVERNIGHT EVENTS:  ICU Vital Signs Last 24 Hrs  T(C): 36.1 (11 Sep 2019 11:00), Max: 36.1 (11 Sep 2019 11:00)  T(F): 96.9 (11 Sep 2019 11:00), Max: 96.9 (11 Sep 2019 11:00)  HR: 78 (11 Sep 2019 07:) (63 - 82)  BP: 75/44 (11 Sep 2019 07:) (72/45 - 103/54)  BP(mean): 55 (11 Sep 2019 07:) (55 - 76)  ABP: --  ABP(mean): --  RR: 18 (11 Sep 2019 11:) (10 - 28)  SpO2: 99% (11 Sep 2019 07:) (95% - 100%)    I&O's Summary    10 Sep 2019 07:  -  11 Sep 2019 07:00  --------------------------------------------------------  IN: 3440 mL / OUT: 910 mL / NET: 2530 mL    11 Sep 2019 07:01  -  11 Sep 2019 11:00  --------------------------------------------------------  IN: 675 mL / OUT: 210 mL / NET: 465 mL          LABS:                        6.8    7.83  )-----------( 276      ( 11 Sep 2019 07:30 )             22.0     09-11    141  |  109  |  70<HH>  ----------------------------<  79  4.7   |  19  |  4.7<HH>    Ca    7.3<L>      11 Sep 2019 07:30  Phos  8.0     -11  Mg     1.6     11    TPro  5.4<L>  /  Alb  2.0<L>  /  TBili  <0.2  /  DBili  x   /  AST  34  /  ALT  19  /  AlkPhos  160<H>  09-10    PT/INR - ( 09 Sep 2019 20:45 )   PT: 14.50 sec;   INR: 1.26 ratio         PTT - ( 09 Sep 2019 20:45 )  PTT:38.8 sec  Urinalysis Basic - ( 09 Sep 2019 20:34 )    Color: Blue / Appearance: Cloudy / S.025 / pH: x  Gluc: x / Ketone: Negative  / Bili: Moderate / Urobili: 0.2 mg/dL   Blood: x / Protein: >=300 mg/dL / Nitrite: Negative   Leuk Esterase: Small / RBC: 6-10 /HPF / WBC 10-25 /HPF   Sq Epi: x / Non Sq Epi: Few /HPF / Bacteria: TNTC /HPF      CAPILLARY BLOOD GLUCOSE      POCT Blood Glucose.: 66 mg/dL (10 Sep 2019 22:07)  POCT Blood Glucose.: 92 mg/dL (10 Sep 2019 17:03)  POCT Blood Glucose.: 119 mg/dL (10 Sep 2019 11:22)    ABG - ( 10 Sep 2019 13:44 )  pH, Arterial: 7.27  pH, Blood: x     /  pCO2: 44    /  pO2: 134   / HCO3: 20    / Base Excess: -6.3  /  SaO2: 99                  RADIOLOGY & ADDITIONAL TESTS:    Consultant(s) Notes Reviewed:  [x ] YES  [ ] NO    MEDICATIONS  (STANDING):  cefepime   IVPB 1000 milliGRAM(s) IV Intermittent every 24 hours  chlorhexidine 4% Liquid 1 Application(s) Topical two times a day  dextrose 5% + sodium chloride 0.45%. 1000 milliLiter(s) (75 mL/Hr) IV Continuous <Continuous>  heparin  Injectable 5000 Unit(s) SubCutaneous every 8 hours  lactated ringers. 1000 milliLiter(s) (75 mL/Hr) IV Continuous <Continuous>  pantoprazole    Tablet 40 milliGRAM(s) Oral before breakfast  silver sulfADIAZINE 1% Cream 1 Application(s) Topical two times a day    MEDICATIONS  (PRN):  flumazenil Injectable 0.5 milliGRAM(s) IV Push once PRN benzo overdose      PHYSICAL EXAM:  GENERAL: well built, well nourished  NERVOUS SYSTEM:  Alert & Oriented X3, Good concentration; Motor Strength 5/5 B/L upper and lower extremities;   CHEST/LUNG: B/L good air entry; No rales, rhonchi, or wheezing  HEART: S1S2 normal, no S3, Regular rate and rhythm; No murmurs, rubs, or gallops  ABDOMEN: Soft, Nontender, Nondistended; Bowel sounds present  SKIN: No rashes or lesions, bka Patient is a 58y old  Male who presents with a chief complaint of AMS (11 Sep 2019 08:53)  No acute overnight event.   More awake follow command low bP     ICU Vital Signs Last 24 Hrs  T(C): 36.1 (11 Sep 2019 11:00), Max: 36.1 (11 Sep 2019 11:00)  T(F): 96.9 (11 Sep 2019 11:00), Max: 96.9 (11 Sep 2019 11:00)  HR: 78 (11 Sep 2019 07:) (63 - 82)  BP: 75/44 (11 Sep 2019 07:00) (72/45 - 103/54)  BP(mean): 55 (11 Sep 2019 07:) (55 - 76)  ABP: --  ABP(mean): --  RR: 18 (11 Sep 2019 11:) (10 - 28)  SpO2: 99% (11 Sep 2019 07:) (95% - 100%)    I&O's Summary    10 Sep 2019 07:  -  11 Sep 2019 07:00  --------------------------------------------------------  IN: 3440 mL / OUT: 910 mL / NET: 2530 mL    11 Sep 2019 07:01  -  11 Sep 2019 11:00  --------------------------------------------------------  IN: 675 mL / OUT: 210 mL / NET: 465 mL          LABS:                        6.8    7.83  )-----------( 276      ( 11 Sep 2019 07:30 )             22.0     09-11    141  |  109  |  70<HH>  ----------------------------<  79  4.7   |  19  |  4.7<HH>    Ca    7.3<L>      11 Sep 2019 07:30  Phos  8.0     11  Mg     1.6     11    TPro  5.4<L>  /  Alb  2.0<L>  /  TBili  <0.2  /  DBili  x   /  AST  34  /  ALT  19  /  AlkPhos  160<H>  09-10    PT/INR - ( 09 Sep 2019 20:45 )   PT: 14.50 sec;   INR: 1.26 ratio         PTT - ( 09 Sep 2019 20:45 )  PTT:38.8 sec  Urinalysis Basic - ( 09 Sep 2019 20:34 )    Color: Blue / Appearance: Cloudy / S.025 / pH: x  Gluc: x / Ketone: Negative  / Bili: Moderate / Urobili: 0.2 mg/dL   Blood: x / Protein: >=300 mg/dL / Nitrite: Negative   Leuk Esterase: Small / RBC: 6-10 /HPF / WBC 10-25 /HPF   Sq Epi: x / Non Sq Epi: Few /HPF / Bacteria: TNTC /HPF      CAPILLARY BLOOD GLUCOSE      POCT Blood Glucose.: 66 mg/dL (10 Sep 2019 22:07)  POCT Blood Glucose.: 92 mg/dL (10 Sep 2019 17:03)  POCT Blood Glucose.: 119 mg/dL (10 Sep 2019 11:22)    ABG - ( 10 Sep 2019 13:44 )  pH, Arterial: 7.27  pH, Blood: x     /  pCO2: 44    /  pO2: 134   / HCO3: 20    / Base Excess: -6.3  /  SaO2: 99                  RADIOLOGY & ADDITIONAL TESTS:    Consultant(s) Notes Reviewed:  [x ] YES  [ ] NO    MEDICATIONS  (STANDING):  cefepime   IVPB 1000 milliGRAM(s) IV Intermittent every 24 hours  chlorhexidine 4% Liquid 1 Application(s) Topical two times a day  dextrose 5% + sodium chloride 0.45%. 1000 milliLiter(s) (75 mL/Hr) IV Continuous <Continuous>  heparin  Injectable 5000 Unit(s) SubCutaneous every 8 hours  lactated ringers. 1000 milliLiter(s) (75 mL/Hr) IV Continuous <Continuous>  pantoprazole    Tablet 40 milliGRAM(s) Oral before breakfast  silver sulfADIAZINE 1% Cream 1 Application(s) Topical two times a day    MEDICATIONS  (PRN):  flumazenil Injectable 0.5 milliGRAM(s) IV Push once PRN benzo overdose      PHYSICAL EXAM:  GENERAL: well built, well nourished  NERVOUS SYSTEM:  Alert & Oriented X3, Good concentration; Motor Strength 5/5 B/L upper and lower extremities;   CHEST/LUNG: B/L good air entry; No rales, rhonchi, or wheezing  HEART: S1S2 normal, no S3, Regular rate and rhythm; No murmurs, rubs, or gallops  ABDOMEN: Soft, Nontender, Nondistended; Bowel sounds present  SKIN:   R BKA, sacral decubiti

## 2019-09-11 NOTE — CONSULT NOTE ADULT - SUBJECTIVE AND OBJECTIVE BOX
[facility]  INITIAL CONSULT NOTE  --------------------------------------------------------------------------------  HPI:  60 yo white male w/ extensive PMHx detailed below, including DM, PAD s/p right BKA, anemia, paraplegia secondary to vertebral compression fracture.  Brought to ER for altered mental status due to excessive benzodiazepine dose.  Found to have Screat 5.5.....3.4 w/ negligible proteinuria in July, normal July 2018.  Pt's SBP's have been 80's-90's since ER.  On LR IV>  Has SPC w/ good U.O.        PAST HISTORY  --------------------------------------------------------------------------------  PAST MEDICAL & SURGICAL HISTORY:  Anemia  PAD (peripheral artery disease)  Hypertension  Suprapubic catheter  Pressure ulcer  Diabetes  Lumbar compression fracture  S/P below knee amputation, right  S/P debridement  Toe amputation status, right  H/O hernia repair    FAMILY HISTORY:  No pertinent family history in first degree relatives    PAST SOCIAL HISTORY:    ALLERGIES & MEDICATIONS  --------------------------------------------------------------------------------  Allergies    sulfamethizole (Unknown)    Intolerances      Standing Inpatient Medications  cefepime   IVPB 1000 milliGRAM(s) IV Intermittent every 24 hours  chlorhexidine 4% Liquid 1 Application(s) Topical two times a day  dextrose 5% + sodium chloride 0.45%. 1000 milliLiter(s) IV Continuous <Continuous>  heparin  Injectable 5000 Unit(s) SubCutaneous every 8 hours  lactated ringers Bolus 250 milliLiter(s) IV Bolus once  lactated ringers. 1000 milliLiter(s) IV Continuous <Continuous>  pantoprazole    Tablet 40 milliGRAM(s) Oral before breakfast  silver sulfADIAZINE 1% Cream 1 Application(s) Topical two times a day    PRN Inpatient Medications  flumazenil Injectable 0.5 milliGRAM(s) IV Push once PRN  morphine  - Injectable 2 milliGRAM(s) IV Push every 4 hours PRN  oxyCODONE    5 mG/acetaminophen 325 mG 1 Tablet(s) Oral every 4 hours PRN      REVIEW OF SYSTEMS  --------------------------------------------------------------------------------  Gen: No weight changes, fatigue, fevers/chills, weakness  Skin: No rashes  Head/Eyes/Ears/Mouth: No headache; Normal hearing; Normal vision w/o blurriness; No sinus pain/discomfort, sore throat  Respiratory: No dyspnea, cough, wheezing, hemoptysis  CV: No chest pain, PND, orthopnea  GI: No abdominal pain, diarrhea, constipation, nausea, vomiting, melena, hematochezia  : No increased frequency, dysuria, hematuria, nocturia  MSK: No joint pain/swelling; no back pain; no edema  Neuro: No dizziness/lightheadedness, weakness, seizures, numbness, tingling  Heme: No easy bruising or bleeding  Endo: No heat/cold intolerance  Psych: No significant nervousness, anxiety, stress, depression    All other systems were reviewed and are negative, except as noted.    VITALS/PHYSICAL EXAM  --------------------------------------------------------------------------------  T(C): 36.6 (09-11-19 @ 15:55), Max: 36.6 (09-11-19 @ 15:00)  HR: 83 (09-11-19 @ 15:55) (72 - 83)  BP: 89/55 (09-11-19 @ 15:55) (72/45 - 104/55)  RR: 18 (09-11-19 @ 17:00) (10 - 24)  SpO2: 100% (09-11-19 @ 15:55) (95% - 100%)  Wt(kg): --  Height (cm): 188 (09-10-19 @ 03:35)  Weight (kg): 68.6 (09-10-19 @ 03:35)  BMI (kg/m2): 19.4 (09-10-19 @ 03:35)  BSA (m2): 1.93 (09-10-19 @ 03:35)      09-10-19 @ 07:01  -  09-11-19 @ 07:00  --------------------------------------------------------  IN: 3440 mL / OUT: 910 mL / NET: 2530 mL    09-11-19 @ 07:01  -  09-11-19 @ 18:33  --------------------------------------------------------  IN: 1170 mL / OUT: 515 mL / NET: 655 mL      Physical Exam:  	Gen: NAD, well-appearing  	HEENT: PERRL, supple neck, clear oropharynx  	Pulm: CTA B/L  	CV: RRR, S1S2; no rub  	Back: No spinal or CVA tenderness; no sacral edema  	Abd: +BS, soft, nontender/nondistended  	: No suprapubic tenderness  	UE: Warm, FROM, no clubbing, intact strength; no edema  	LE: Warm; no edema; right BKA  	Neuro: No focal deficits  	Psych: Normal affect and mood  	Skin: Warm, without rashes  	Vascular access:    LABS/STUDIES  --------------------------------------------------------------------------------              6.8    7.83  >-----------<  276      [09-11-19 @ 07:30]              22.0     141  |  109  |  70  ----------------------------<  79      [09-11-19 @ 07:30]  4.7   |  19  |  4.7        Ca     7.3     [09-11-19 @ 07:30]      Mg     1.6     [09-11-19 @ 07:30]      Phos  8.0     [09-11-19 @ 07:30]    TPro  5.4  /  Alb  2.0  /  TBili  <0.2  /  DBili  x   /  AST  34  /  ALT  19  /  AlkPhos  160  [09-10-19 @ 05:30]    PT/INR: PT 14.50, INR 1.26       [09-09-19 @ 20:45]  PTT: 38.8       [09-09-19 @ 20:45]    Troponin 0.09      [09-11-19 @ 07:30]    Creatinine Trend:  SCr 4.7 [09-11 @ 07:30]  SCr 4.8 [09-10 @ 19:31]  SCr 4.8 [09-10 @ 15:47]  SCr 5.1 [09-10 @ 05:30]  SCr 5.5 [09-09 @ 20:45]    Urinalysis - [09-09-19 @ 20:34]      Color Blue / Appearance Cloudy / SG 1.025 / pH 6.5      Gluc Negative / Ketone Negative  / Bili Moderate / Urobili 0.2       Blood Large / Protein >=300 / Leuk Est Small / Nitrite Negative      RBC 6-10 / WBC 10-25 / Hyaline  / Gran  / Sq Epi  / Non Sq Epi Few / Bacteria TNTC    Urine Creatinine 48      [09-10-19 @ 21:00]  Urine Sodium 45.0      [09-10-19 @ 21:00]  Urine Urea Nitrogen 481      [09-10-19 @ 21:00]  Urine Potassium 20      [09-10-19 @ 21:00]  Urine Osmolality 323      [09-10-19 @ 21:00]    Iron 37, TIBC 84, %sat 44      [07-05-19 @ 11:00]  PTH -- (Ca 7.8)      [07-02-19 @ 10:54]   71  HbA1c 5.5      [06-27-19 @ 06:35]    HBsAg Nonreact      [07-06-19 @ 07:13]  HCV 0.13, Nonreact      [07-06-19 @ 07:13]    JOSE D: titer Negative, pattern --      [07-06-19 @ 07:13]  C3 Complement 104      [07-06-19 @ 07:13]  C4 Complement 24      [07-06-19 @ 07:13]  ANCA: cANCA Negative, pANCA Negative, atypical ANCA Negative      [07-06-19 @ 07:13]  Immunofixation Serum:   Polyclonal Expansion with a Weak IgG Kappa Band Identified    Reference Range: None Detected      [07-06-19 @ 07:13]  Immunofixation Urine: Reference Range: None Detected      [07-05-19 @ 23:30]

## 2019-09-11 NOTE — CONSULT NOTE ADULT - PROBLEM SELECTOR RECOMMENDATION 2
No further workup indicated at present  Pt clearly states he would never agree to dialysis
stable   chronic   follow up with [facility] - Dr Gooden   no evidence of ongoing infection

## 2019-09-11 NOTE — PROGRESS NOTE ADULT - ASSESSMENT
59yo paraplegic M w/ PMH of HTN, chronic decubitus ulcer (follows Dr. Gooden in [facility]), R leg amputation p/w AMS. In ED, pt was still obtunded, not waking up. As per ED physician, pt woke up after 1st narcan push, but became obtunded again. Pt had a great relief after flumazenil push, pt waking up and alert. Pt reports taking 3-4 pills of 2mg xanax at night. Pt was found to have SILVINA of cr of 5.5 (baseline 3.4). Elevated trop of 0.09 noted. VBG shows pH 7.09, CO2 42, HCO3 13. Bicarb drip started.       # AMS 2/2 substance abuse (benzo more likely than opioid)  - resolved    # SILVINA  - resolving  - nephrology consult  - urine lytes to follow     # Leukocytosis  - resolved  - on cefepime and vanc    # Elevated Troponin  - as per caridology, likely due to ckd non cardiac     # HTN  - c/w amlodipine    Activity: as tolerated  diet: renal  DVT prophylaxis: heparin subq  GI prophylaxis: PPI  full code  dispo: from home 57yo paraplegic M w/ PMH of HTN, chronic decubitus ulcer (follows Dr. Gooden in [facility]), R leg amputation p/w AMS. In ED, pt was still obtunded, not waking up. As per ED physician, pt woke up after 1st narcan push, but became obtunded again. Pt had a great relief after flumazenil push, pt waking up and alert. Pt reports taking 3-4 pills of 2mg xanax at night. Pt was found to have SILVINA of cr of 5.5 (baseline 3.4). Elevated trop of 0.09 noted. VBG shows pH 7.09, CO2 42, HCO3 13. Bicarb drip started.       # AMS 2/2 substance abuse (benzo more likely than opioid)  - resolved    # SILVNIA  - resolving  - nephrology consult  - urine lytes to follow     # Leukocytosis  - resolved  - on cefepime and vanc    # Elevated Troponin  - as per caridology, likely due to ckd non cardiac     # HTN  - c/w amlodipine    # sacral decubiti present on admission  Decubitus ulcer protocol      Activity: as tolerated  diet: renal  DVT prophylaxis: heparin subq  GI prophylaxis: PPI  full code  dispo: from home 59yo paraplegic M w/ PMH of HTN, chronic decubitus ulcer (follows Dr. Gooden in [facility]), R leg amputation p/w AMS. In ED, pt was still obtunded, not waking up. As per ED physician, pt woke up after 1st narcan push, but became obtunded again. Pt had a great relief after flumazenil push, pt waking up and alert. Pt reports taking 3-4 pills of 2mg xanax at night. Pt was found to have SILVINA of cr of 5.5 (baseline 3.4). Elevated trop of 0.09 noted. VBG shows pH 7.09, CO2 42, HCO3 13. Bicarb drip started.       # AMS 2/2 substance abuse (benzo more likely than opioid)  - resolved    # SILVINA on CKD stage 3 to 4  - resolving  - nephrology consult  - urine lytes to follow     # Leukocytosis suspect reactive no source of infection  - resolved  - on cefepime and vanc  - check cultures    # Elevated Troponin  - as per cardiology likely due to ckd non cardiac     # HTN  - c/w amlodipine    # sacral decubiti present on admission  Decubitus ulcer protocol    # Acute on chronic anemia suspect of chronic disease  - PRBC transfusion  -Monitor h/h    Activity: as tolerated  diet: renal  DVT prophylaxis: heparin subq  GI prophylaxis: PPI  full code  dispo: from home 57yo paraplegic M w/ PMH of HTN, chronic decubitus ulcer (follows Dr. Gooden in [facility]), R leg amputation p/w AMS. In ED, pt was still obtunded, not waking up. As per ED physician, pt woke up after 1st narcan push, but became obtunded again. Pt had a great relief after flumazenil push, pt waking up and alert. Pt reports taking 3-4 pills of 2mg xanax at night. Pt was found to have SILVINA of cr of 5.5 (baseline 3.4). Elevated trop of 0.09 noted. VBG shows pH 7.09, CO2 42, HCO3 13. Bicarb drip started.       # AMS 2/2 substance abuse (benzo more likely than opioid)  - resolved    # SILVINA on CKD stage 3 to 4  - resolving  - nephrology consult  - urine lytes to follow     # Leukocytosis suspect UTI gram neg  - on cefepime and vanc  - Urine culture reviewed and sensitivity pending    # Elevated Troponin  - as per cardiology likely due to ckd non cardiac     # HTN  - c/w amlodipine    # sacral decubiti present on admission  Decubitus ulcer protocol    # Acute on chronic anemia suspect of chronic disease  - PRBC transfusion  -Monitor h/h    Activity: as tolerated  diet: renal  DVT prophylaxis: heparin subq  GI prophylaxis: PPI  full code  dispo: from home 59yo paraplegic M w/ PMH of HTN, chronic decubitus ulcer (follows Dr. Gooden in [facility]), R leg amputation p/w AMS. In ED, pt was still obtunded, not waking up. As per ED physician, pt woke up after 1st narcan push, but became obtunded again. Pt had a great relief after flumazenil push, pt waking up and alert. Pt reports taking 3-4 pills of 2mg xanax at night. Pt was found to have SILVINA of cr of 5.5 (baseline 3.4). Elevated trop of 0.09 noted. VBG shows pH 7.09, CO2 42, HCO3 13. Bicarb drip started.       # Toxic metabolic encephalopathy 2/2 substance abuse (benzo more likely than opioid)  - resolved  -Monitor    # SILVINA on CKD stage 3 to 4  - resolving  - nephrology consult  - urine lytes to follow     # Leukocytosis suspect UTI gram neg  - on cefepime and vanc  - Urine culture reviewed and sensitivity pending    # Elevated Troponin  - as per cardiology likely due to ckd non cardiac     # HTN  - c/w amlodipine    # sacral decubiti present on admission  Decubitus ulcer protocol    # Acute on chronic anemia suspect of chronic disease   - PRBC transfusion  -Monitor h/h    # functional quadriplegia  - Monitor        Activity: as tolerated  diet: renal  DVT prophylaxis: heparin subq  GI prophylaxis: PPI  full code  dispo: from home 57yo paraplegic M w/ PMH of HTN, chronic decubitus ulcer (follows Dr. Gooden in [facility]), R leg amputation p/w AMS. In ED, pt was still obtunded, not waking up. As per ED physician, pt woke up after 1st narcan push, but became obtunded again. Pt had a great relief after flumazenil push, pt waking up and alert. Pt reports taking 3-4 pills of 2mg xanax at night. Pt was found to have SILVINA of cr of 5.5 (baseline 3.4). Elevated trop of 0.09 noted. VBG shows pH 7.09, CO2 42, HCO3 13. Bicarb drip started.       # Toxic encephalopathy 2/2 substance abuse (benzo more likely than opioid)  - resolved  -Monitor    # SILVINA on CKD stage 3 to 4  - resolving  - nephrology consult  - urine lytes to follow     # Leukocytosis suspect UTI gram neg  - on cefepime and vanc  - Urine culture reviewed and sensitivity pending    # Elevated Troponin  - as per cardiology likely due to ckd non cardiac     # HTN  - c/w amlodipine    # sacral decubiti present on admission  Decubitus ulcer protocol    # Acute on chronic anemia suspect of chronic disease   - PRBC transfusion  -Monitor h/h    # functional quadriplegia  - Monitor        Activity: as tolerated  diet: renal  DVT prophylaxis: heparin subq  GI prophylaxis: PPI  full code  dispo: from home

## 2019-09-11 NOTE — CONSULT NOTE ADULT - ASSESSMENT
Patient awake now. Denies chest pain sob. Possible type 2 MI secondary to overdose. Check ekg echo enzymes. D/C smoking. Outpatient dobutamine se in 4-6 weeks

## 2019-09-11 NOTE — PROGRESS NOTE ADULT - SUBJECTIVE AND OBJECTIVE BOX
Patient is seen and examined at the bed side, is afebrile. The Urine culture grew  Klebsiella oxytoca andKluyvera ascorbata          REVIEW OF SYSTEMS:      ICU Vital Signs Last 24 Hrs  T(C): 36.9 (11 Sep 2019 19:00), Max: 37.1 (11 Sep 2019 18:30)  T(F): 98.4 (11 Sep 2019 19:00), Max: 98.7 (11 Sep 2019 18:30)  HR: 83 (11 Sep 2019 15:55) (73 - 83)  BP: 89/55 (11 Sep 2019 15:55) (75/44 - 104/55)  BP(mean): 68 (11 Sep 2019 15:55) (55 - 74)  ABP: --  ABP(mean): --  RR: 18 (11 Sep 2019 21:00) (11 - 24)  SpO2: 100% (11 Sep 2019 15:55) (95% - 100%)      PHYSICAL EXAM:  GENERAL: Not in distress  CHEST/LUNG: Air entry  bilaterally  HEART: Regular rate and rhythm; No murmurs, rubs, or gallops  ABDOMEN: Soft, Nontender, Nondistended; Bowel sounds present  EXTREMITIES:  2+ Peripheral Pulses, No clubbing, cyanosis, or edema  CNS:       MEDICATIONS  (STANDING):  cefepime   IVPB 1000 milliGRAM(s) IV Intermittent every 24 hours  chlorhexidine 4% Liquid 1 Application(s) Topical two times a day  dextrose 5% + sodium chloride 0.45%. 1000 milliLiter(s) (75 mL/Hr) IV Continuous <Continuous>  heparin  Injectable 5000 Unit(s) SubCutaneous every 8 hours  lactated ringers. 1000 milliLiter(s) (75 mL/Hr) IV Continuous <Continuous>  pantoprazole    Tablet 40 milliGRAM(s) Oral before breakfast  silver sulfADIAZINE 1% Cream 1 Application(s) Topical two times a day    MEDICATIONS  (PRN):  flumazenil Injectable 0.5 milliGRAM(s) IV Push once PRN benzo overdose  morphine  - Injectable 2 milliGRAM(s) IV Push every 4 hours PRN Moderate Pain (4 - 6)  oxyCODONE    5 mG/acetaminophen 325 mG 1 Tablet(s) Oral every 4 hours PRN Severe Pain (7 - 10)        Allergies    sulfamethizole (Unknown)            LABS:                        7.4    8.09  )-----------( 238      ( 11 Sep 2019 21:30 )             23.5       09-11    139  |  109  |  67<HH>  ----------------------------<  106<H>  4.5   |  19  |  4.7<HH>    Ca    7.0<L>      11 Sep 2019 21:30  Phos  8.0     09-11  Mg     1.4     09-11    TPro  5.4<L>  /  Alb  2.0<L>  /  TBili  <0.2  /  DBili  x   /  AST  34  /  ALT  19  /  AlkPhos  160<H>  09-10          RADIOLOGY & ADDITIONAL TESTS:    < from: Xray Chest 1 View- PORTABLE-Routine (09.11.19 @ 06:25) >    Mild left basilar subsegmental atelectasis. No definite focal   consolidation, effusion, or pneumothorax.        MICROBIOLOGY DATA:    Culture - Blood (09.09.19 @ 22:00)    Specimen Source: .Blood Blood    Culture Results:   No growth to date.        Culture - Blood (09.09.19 @ 22:00)    Specimen Source: .Blood Blood    Culture Results:   No growth to date.        Urine Microscopic-Add On (NC) (09.09.19 @ 20:34)    Red Blood Cell - Urine: 6-10 /HPF    White Blood Cell - Urine: 10-25 /HPF    Bacteria: TNTC /HPF    Epithelial Cells: Few /HPF      Culture - Urine (09.09.19 @ 20:34)    -  Cefazolin: S <=8    -  Cefazolin: S 16    -  Amikacin: S <=16    -  Amikacin: S <=16    -  Aztreonam: S <=4    -  Aztreonam: S <=4    -  Ampicillin: R >16 These ampicillin results predict results for amoxicillin    -  Ampicillin: R >16 These ampicillin results predict results for amoxicillin    -  Ampicillin/Sulbactam: S <=8/4 Enterobacter, Citrobacter, and Serratia may develop resistance during prolonged therapy (3-4 days)    -  Ampicillin/Sulbactam: R >16/8 Enterobacter, Citrobacter, and Serratia may develop resistance during prolonged therapy (3-4 days)    -  Trimethoprim/Sulfamethoxazole: S <=2/38    -  Trimethoprim/Sulfamethoxazole: R >2/38    -  Tobramycin: S <=4    -  Tigecycline: S <=2    -  Tigecycline: S <=2    -  Piperacillin/Tazobactam: S <=16    -  Tobramycin: S <=4    -  Piperacillin/Tazobactam: S <=16    -  Nitrofurantoin: S <=32 Should not be used to treat pyelonephritis    -  Nitrofurantoin: S <=32 Should not be used to treat pyelonephritis    -  Meropenem: S <=1    -  Meropenem: S <=1    -  Levofloxacin: R >4    -  Levofloxacin: R >4    -  Imipenem: S <=1    -  Imipenem: S <=1    -  Gentamicin: S <=4    -  Gentamicin: R >8    -  Ertapenem: S <=1    -  Ceftriaxone: S <=1 Enterobacter, Citrobacter, and Serratia may develop resistance during prolonged therapy    -  Ceftriaxone: S <=1 Enterobacter, Citrobacter, and Serratia may develop resistance during prolonged therapy    -  Ciprofloxacin: R >2    -  Ciprofloxacin: R >2    -  Cefoxitin: S <=8    -  Cefoxitin: S <=8    -  Cefepime: S <=4    -  Cefepime: S <=4    Specimen Source: .Urine Catheterized    Culture Results:   >100,000 CFU/ml Klebsiella oxytoca  >100,000 CFU/ml Jamia saenz    Organism Identification: Klebsiella oxytoca  Jamia saenz    Organism: Klebsiella oxytoca    Organism: Jamia saenz    Method Type: ALLISON    Method Type: ALLISON Patient is seen and examined at the bed side, is afebrile. The Urine culture grew  Klebsiella oxytoca andKluyvera ascorbata      REVIEW OF SYSTEMS: All other review systems are negative        ICU Vital Signs Last 24 Hrs  T(C): 36.9 (11 Sep 2019 19:00), Max: 37.1 (11 Sep 2019 18:30)  T(F): 98.4 (11 Sep 2019 19:00), Max: 98.7 (11 Sep 2019 18:30)  HR: 83 (11 Sep 2019 15:55) (73 - 83)  BP: 89/55 (11 Sep 2019 15:55) (75/44 - 104/55)  BP(mean): 68 (11 Sep 2019 15:55) (55 - 74)  ABP: --  ABP(mean): --  RR: 18 (11 Sep 2019 21:00) (11 - 24)  SpO2: 100% (11 Sep 2019 15:55) (95% - 100%)      PHYSICAL EXAM:  GENERAL: Not in distress  CHEST/LUNG: Air entry  bilaterally  HEART: s1 and s2 present   ABDOMEN:  Nontender and Nondistended  : supra pubic catheter in placed  EXTREMITIES: No pedal  edema  CNS: AAOX 3        MEDICATIONS  (STANDING):  cefepime   IVPB 1000 milliGRAM(s) IV Intermittent every 24 hours  chlorhexidine 4% Liquid 1 Application(s) Topical two times a day  dextrose 5% + sodium chloride 0.45%. 1000 milliLiter(s) (75 mL/Hr) IV Continuous <Continuous>  heparin  Injectable 5000 Unit(s) SubCutaneous every 8 hours  lactated ringers. 1000 milliLiter(s) (75 mL/Hr) IV Continuous <Continuous>  pantoprazole    Tablet 40 milliGRAM(s) Oral before breakfast  silver sulfADIAZINE 1% Cream 1 Application(s) Topical two times a day    MEDICATIONS  (PRN):  flumazenil Injectable 0.5 milliGRAM(s) IV Push once PRN benzo overdose  morphine  - Injectable 2 milliGRAM(s) IV Push every 4 hours PRN Moderate Pain (4 - 6)  oxyCODONE    5 mG/acetaminophen 325 mG 1 Tablet(s) Oral every 4 hours PRN Severe Pain (7 - 10)        Allergies    sulfamethizole (Unknown)            LABS:                        7.4    8.09  )-----------( 238      ( 11 Sep 2019 21:30 )             23.5       09-11    139  |  109  |  67<HH>  ----------------------------<  106<H>  4.5   |  19  |  4.7<HH>    Ca    7.0<L>      11 Sep 2019 21:30  Phos  8.0     09-11  Mg     1.4     09-11    TPro  5.4<L>  /  Alb  2.0<L>  /  TBili  <0.2  /  DBili  x   /  AST  34  /  ALT  19  /  AlkPhos  160<H>  09-10          RADIOLOGY & ADDITIONAL TESTS:    < from: Xray Chest 1 View- PORTABLE-Routine (09.11.19 @ 06:25) >    Mild left basilar subsegmental atelectasis. No definite focal   consolidation, effusion, or pneumothorax.        MICROBIOLOGY DATA:    Culture - Blood (09.09.19 @ 22:00)    Specimen Source: .Blood Blood    Culture Results:   No growth to date.        Culture - Blood (09.09.19 @ 22:00)    Specimen Source: .Blood Blood    Culture Results:   No growth to date.        Urine Microscopic-Add On (NC) (09.09.19 @ 20:34)    Red Blood Cell - Urine: 6-10 /HPF    White Blood Cell - Urine: 10-25 /HPF    Bacteria: TNTC /HPF    Epithelial Cells: Few /HPF      Culture - Urine (09.09.19 @ 20:34)    -  Cefazolin: S <=8    -  Cefazolin: S 16    -  Amikacin: S <=16    -  Amikacin: S <=16    -  Aztreonam: S <=4    -  Aztreonam: S <=4    -  Ampicillin: R >16 These ampicillin results predict results for amoxicillin    -  Ampicillin: R >16 These ampicillin results predict results for amoxicillin    -  Ampicillin/Sulbactam: S <=8/4 Enterobacter, Citrobacter, and Serratia may develop resistance during prolonged therapy (3-4 days)    -  Ampicillin/Sulbactam: R >16/8 Enterobacter, Citrobacter, and Serratia may develop resistance during prolonged therapy (3-4 days)    -  Trimethoprim/Sulfamethoxazole: S <=2/38    -  Trimethoprim/Sulfamethoxazole: R >2/38    -  Tobramycin: S <=4    -  Tigecycline: S <=2    -  Tigecycline: S <=2    -  Piperacillin/Tazobactam: S <=16    -  Tobramycin: S <=4    -  Piperacillin/Tazobactam: S <=16    -  Nitrofurantoin: S <=32 Should not be used to treat pyelonephritis    -  Nitrofurantoin: S <=32 Should not be used to treat pyelonephritis    -  Meropenem: S <=1    -  Meropenem: S <=1    -  Levofloxacin: R >4    -  Levofloxacin: R >4    -  Imipenem: S <=1    -  Imipenem: S <=1    -  Gentamicin: S <=4    -  Gentamicin: R >8    -  Ertapenem: S <=1    -  Ceftriaxone: S <=1 Enterobacter, Citrobacter, and Serratia may develop resistance during prolonged therapy    -  Ceftriaxone: S <=1 Enterobacter, Citrobacter, and Serratia may develop resistance during prolonged therapy    -  Ciprofloxacin: R >2    -  Ciprofloxacin: R >2    -  Cefoxitin: S <=8    -  Cefoxitin: S <=8    -  Cefepime: S <=4    -  Cefepime: S <=4    Specimen Source: .Urine Catheterized    Culture Results:   >100,000 CFU/ml Klebsiella oxytoca  >100,000 CFU/ml Jamia saenz    Organism Identification: Klebsiella oxytoca  Jamia saenz    Organism: Klebsiella oxytoca    Organism: Jamia saenz    Method Type: ALLISON    Method Type: ALLISON

## 2019-09-11 NOTE — CONSULT NOTE ADULT - ASSESSMENT
1)  SILVINA on CKD likely due to hypotension.  No evidence of sepsis.  No urinary tract obstruction on CT    2)  Underlying severe renal insufficiency w/ creat mid-3.o's to 4.0 in June and July this year, felt to be chronic (eg, Stage 4 CKD)    3)  Mixed metabolic (due to SILVINA and severe CKD) and respiratory (due to CNS suppression) acidosis    4)  Mild hypomagnesemia    5)  Severe anemia, which pt states has been present for a long time, but requiring transfusions recently.  Perhaps he does have true CKD and therefore erythropoietin deficiency superimposed on perhaps some other underlying cause of anemia

## 2019-09-11 NOTE — PROGRESS NOTE ADULT - SUBJECTIVE AND OBJECTIVE BOX
Patient is a 58y old  Male who presents with a chief complaint of AMS (11 Sep 2019 08:28)      Over Night Events:  Patient seen and examined more awake follow command low bP       ROS:  See HPI    PHYSICAL EXAM    ICU Vital Signs Last 24 Hrs  T(C): 35.3 (11 Sep 2019 07:01), Max: 35.8 (10 Sep 2019 23:07)  T(F): 95.5 (11 Sep 2019 07:01), Max: 96.5 (10 Sep 2019 23:07)  HR: 78 (11 Sep 2019 07:00) (63 - 82)  BP: 75/44 (11 Sep 2019 07:00) (72/45 - 103/54)  BP(mean): 55 (11 Sep 2019 07:00) (55 - 76)  ABP: --  ABP(mean): --  RR: 16 (11 Sep 2019 07:01) (10 - 28)  SpO2: 99% (11 Sep 2019 07:00) (95% - 100%)      General: AOx3  HEENT:          chela      Lymph Nodes: NO cervical LN   Lungs: Bilateral BS  Cardiovascular: Regular   Abdomen: Soft, Positive BS  Extremities: No clubbing   Skin: warm   Neurological: no focla deficit   Musculoskeletal: move all ext     I&O's Detail    10 Sep 2019 07:01  -  11 Sep 2019 07:00  --------------------------------------------------------  IN:    dextrose 5% + sodium chloride 0.45%.: 415 mL    IV PiggyBack: 100 mL    norepinephrine Infusion: 5 mL    Oral Fluid: 120 mL    sodium bicarbonate  Infusion: 1800 mL    Sodium Chloride 0.9% IV Bolus: 1000 mL  Total IN: 3440 mL    OUT:    Indwelling Catheter - Suprapubic: 910 mL  Total OUT: 910 mL    Total NET: 2530 mL      11 Sep 2019 07:01  -  11 Sep 2019 08:53  --------------------------------------------------------  IN:  Total IN: 0 mL    OUT:    Indwelling Catheter - Suprapubic: 60 mL  Total OUT: 60 mL    Total NET: -60 mL          LABS:                          7.2    15.35 )-----------( 295      ( 10 Sep 2019 05:30 )             23.9         10 Sep 2019 19:31    141    |  110    |  74     ----------------------------<  60     4.5     |  18     |  4.8      Ca    7.2        10 Sep 2019 19:31  Phos  9.2       10 Sep 2019 05:30  Mg     1.4       10 Sep 2019 05:30                                               PT/INR - ( 09 Sep 2019 20:45 )   PT: 14.50 sec;   INR: 1.26 ratio         PTT - ( 09 Sep 2019 20:45 )  PTT:38.8 sec                                       Urinalysis Basic - ( 09 Sep 2019 20:34 )    Color: Blue / Appearance: Cloudy / S.025 / pH: x  Gluc: x / Ketone: Negative  / Bili: Moderate / Urobili: 0.2 mg/dL   Blood: x / Protein: >=300 mg/dL / Nitrite: Negative   Leuk Esterase: Small / RBC: 6-10 /HPF / WBC 10-25 /HPF   Sq Epi: x / Non Sq Epi: Few /HPF / Bacteria: TNTC /HPF        Lactate, Blood: 0.7 mmol/L (09-10-19 @ 05:30)  Lactate, Blood: 0.7 mmol/L (19 @ 20:45)    CARDIAC MARKERS ( 10 Sep 2019 15:47 )  x     / 0.08 ng/mL / x     / x     / x      CARDIAC MARKERS ( 10 Sep 2019 05:30 )  x     / 0.08 ng/mL / x     / x     / x      CARDIAC MARKERS ( 09 Sep 2019 20:45 )  x     / 0.09 ng/mL / x     / x     / x                                                            Culture - Urine (collected 09 Sep 2019 20:34)  Source: .Urine Catheterized  Preliminary Report (11 Sep 2019 01:54):    >100,000 CFU/ml Gram Negative Rods                                                                                       ABG - ( 10 Sep 2019 13:44 )  pH, Arterial: 7.27  pH, Blood: x     /  pCO2: 44    /  pO2: 134   / HCO3: 20    / Base Excess: -6.3  /  SaO2: 99                  MEDICATIONS  (STANDING):  cefepime   IVPB 1000 milliGRAM(s) IV Intermittent every 24 hours  chlorhexidine 4% Liquid 1 Application(s) Topical two times a day  dextrose 5% + sodium chloride 0.45%. 1000 milliLiter(s) (75 mL/Hr) IV Continuous <Continuous>  heparin  Injectable 5000 Unit(s) SubCutaneous every 8 hours  pantoprazole    Tablet 40 milliGRAM(s) Oral before breakfast  silver sulfADIAZINE 1% Cream 1 Application(s) Topical two times a day  sodium chloride 0.9% Bolus 500 milliLiter(s) IV Bolus once    MEDICATIONS  (PRN):  flumazenil Injectable 0.5 milliGRAM(s) IV Push once PRN benzo overdose          Xrays:  TLC:  OG:  ET tube:                                                                                    no infiltrate    ECHO:  [facility] ICU:

## 2019-09-11 NOTE — CONSULT NOTE ADULT - PROBLEM SELECTOR RECOMMENDATION 4
Patient tolerated treatment today without complications  Aware of upcoming appointments   Declined AVS 
Replete level to > 2.0

## 2019-09-11 NOTE — PROGRESS NOTE ADULT - ASSESSMENT
IMPRESSION:  alter mental status secondary to drug over dose opoid   SILVINA on CKD   metabolic acidosis   anemia stable   UTI   PLAN:    CNS: no sedation for now     HEENT: oral care     PULMONARY: keep pox > 92 % more awake oriented     CARDIOVASCULAR: echo, continue IV fluid bicarb continue for now pending BMP result still pending then will adjust fluid   apply CHEETAH to assess fluid status   keep MAP 65 add levo if needed     GI: GI prophylaxis.  Feeding     RENAL: follow lytes renal US   renal consult   monitor Is and OS       INFECTIOUS DISEASE: pan cx follow ID , follow CX   burn follow up   HEMATOLOGICAL:  DVT prophylaxis. follow cbc h/h morning cbc pending     ENDOCRINE:  Follow up FS.  Insulin protocol if needed.    MUSCULOSKELETAL:    d/c brown     CRITICAL CARE TIME SPENT: *** IMPRESSION:  alter mental status secondary to drug over dose opoid   SILVINA on CKD   metabolic acidosis   anemia stable   UTI   PLAN:    CNS: no sedation for now     HEENT: oral care     PULMONARY: keep pox > 92 % more awake oriented     CARDIOVASCULAR: echo, continue IV fluid bicarb continue for now pending BMP result still pending then will adjust fluid   apply CHEETAH to assess fluid status   keep MAP 65 add levo if needed     GI: GI prophylaxis.  Feeding     RENAL: follow lytes renal US   renal consult   monitor Is and OS       INFECTIOUS DISEASE: pan cx follow ID , follow CX   burn follow up   HEMATOLOGICAL:  DVT prophylaxis. follow cbc h/h morning cbc pending     ENDOCRINE:  Follow up FS.  Insulin protocol if needed.    MUSCULOSKELETAL:    chronic brown   addendum note   transfuse 1 unit prbc now   follow seriel cbc   CHEETAH positive bolus  now and then check CHEETAH  place central line      CRITICAL CARE TIME SPENT: ***

## 2019-09-11 NOTE — PROGRESS NOTE ADULT - ASSESSMENT
# Encephalopathy  # UTI - = Ucx grew Klebsiella oxytoca and Kluyvera ascorbata    would recommend:    1. Continue Cefepime since its sensitive to both Isolates in urine culture  2.       - d/w ICU team # Encephalopathy  # UTI - = Ucx grew Klebsiella oxytoca and Kluyvera ascorbata    would recommend:    1. Continue Cefepime since its sensitive to both Isolates in urine culture  2. Change suprapubic catheter if not done already       - d/w ICU team

## 2019-09-12 NOTE — PROGRESS NOTE ADULT - SUBJECTIVE AND OBJECTIVE BOX
Nephrology progress note    Patient was seen and examined, events over the last 24 h noted .  MS improved    Allergies:  sulfamethizole (Unknown)    Hospital Medications:   MEDICATIONS  (STANDING):  calcium acetate 667 milliGRAM(s) Oral two times a day with meals  cefepime   IVPB 1000 milliGRAM(s) IV Intermittent every 24 hours  chlorhexidine 4% Liquid 1 Application(s) Topical two times a day  dextrose 5% + sodium chloride 0.45%. 1000 milliLiter(s) (75 mL/Hr) IV Continuous <Continuous>  heparin  Injectable 5000 Unit(s) SubCutaneous every 8 hours  lactated ringers. 1000 milliLiter(s) (75 mL/Hr) IV Continuous <Continuous>  pantoprazole    Tablet 40 milliGRAM(s) Oral before breakfast  silver sulfADIAZINE 1% Cream 1 Application(s) Topical two times a day        VITALS:  T(F): 97.6 (19 @ 09:03), Max: 99.2 (19 @ 23:01)  HR: 87 (19 @ 09:03)  BP: 116/59 (19 @ 09:03)  RR: 14 (19 @ 09:03)  SpO2: 98% (19 @ 09:03)  Wt(kg): --    09-10 @ :  -   @ 07:00  --------------------------------------------------------  IN: 3440 mL / OUT: 910 mL / NET: 2530 mL     @ 07:  -   @ 07:00  --------------------------------------------------------  IN: 2630 mL / OUT: 1425 mL / NET: 1205 mL     @ 07:01  -   @ 09:52  --------------------------------------------------------  IN: 740 mL / OUT: 210 mL / NET: 530 mL          PHYSICAL EXAM:  	Gen: NAD, well-appearing  	HEENT: PERRL, supple neck, clear oropharynx  	Pulm: CTA B/L  	CV: RRR, S1S2; no rub  	Back: No spinal or CVA tenderness; no sacral edema  	Abd: +BS, soft, nontender/nondistended  	: No suprapubic tenderness  	UE: Warm, FROM, no clubbing, intact strength; no edema  	LE: Warm; no edema; right BKA  	Neuro: No focal deficits  	Psych: Normal affect and mood  	Skin: Warm, without rashes  	Vascular access:    LABS:      139  |  110  |  66<HH>  ----------------------------<  82  4.5   |  18  |  4.7<HH>    Ca    7.1<L>      12 Sep 2019 05:32  Phos  7.3       Mg     1.9                                 6.8    7.65  )-----------( 232      ( 12 Sep 2019 05:32 )             21.7       Urine Studies:  Urinalysis Basic - ( 09 Sep 2019 20:34 )    Color: Blue / Appearance: Cloudy / S.025 / pH:   Gluc:  / Ketone: Negative  / Bili: Moderate / Urobili: 0.2 mg/dL   Blood:  / Protein: >=300 mg/dL / Nitrite: Negative   Leuk Esterase: Small / RBC: 6-10 /HPF / WBC 10-25 /HPF   Sq Epi:  / Non Sq Epi: Few /HPF / Bacteria: TNTC /HPF      Sodium, Random Urine: 45.0 mmoL/L (09-10 @ 21:00)  Osmolality, Random Urine: 323 mos/kg (09-10 @ 21:00)  Potassium, Random Urine: 20 mmol/L (09-10 @ 21:00)  Creatinine, Random Urine: 48 mg/dL (09-10 @ 21:00)  Sodium, Random Urine: 33.0 mmoL/L (09-10 @ 07:30)  Creatinine, Random Urine: 58 mg/dL (09-10 @ 07:30)    RADIOLOGY & ADDITIONAL STUDIES:

## 2019-09-12 NOTE — PROGRESS NOTE ADULT - ASSESSMENT
57yo paraplegic M w/ PMH of HTN, chronic decubitus ulcer (follows Dr. Gooden in [facility]), R leg amputation p/w AMS. In ED, pt was still obtunded, not waking up. As per ED physician, pt woke up after 1st narcan push, but became obtunded again. Pt had a great relief after flumazenil push, pt waking up and alert. Pt reports taking 3-4 pills of 2mg xanax at night. Pt was found to have SILVINA of cr of 5.5 (baseline 3.4). Elevated trop of 0.09 noted. VBG shows pH 7.09, CO2 42, HCO3 13. Bicarb drip started.       AMS 2/2 substance abuse (benzo more likely than opioid)  - resolved   - Pt takes a lot of benzo and opioids as per home med hx. Reports taking 3-4 pills of 2mg xanax at night  - + great response from flumazenil push    Anemia   - chronic   - transfuse one unit prbc   - possible dc in am    SILVINA on CKD IV  - no HD as per pt  - appreciate nephrology    UTI   - oral keflex as per ID      HTN  - c/w amlodipine      DVT prophylaxis: heparin subq  GI prophylaxis: PPI  Diet: NPO

## 2019-09-12 NOTE — PROGRESS NOTE ADULT - SUBJECTIVE AND OBJECTIVE BOX
Patient is a 58y old  Male who presents with a chief complaint of AMS (12 Sep 2019 05:42)      T(F): 97.5 (09-12-19 @ 07:01), Max: 99.2 (09-11-19 @ 23:01)  HR: 83 (09-12-19 @ 06:11)  BP: 105/55 (09-12-19 @ 06:11)  RR: 16 (09-12-19 @ 07:01)  SpO2: 98% (09-12-19 @ 06:11) (97% - 100%)    PHYSICAL EXAM:  GENERAL: NAD, well-groomed, well-developed  HEAD:  Atraumatic, Normocephalic  EYES: EOMI, PERRLA, conjunctiva and sclera clear  ENMT: No tonsillar erythema, exudates, or enlargement; Moist mucous membranes, Good dentition, No lesions  NECK: Supple, No JVD, Normal thyroid  NERVOUS SYSTEM:  Alert & Oriented X3,  Motor Strength 5/5 B/L upper and lower extremities  CHEST/LUNG: Clear to percussion bilaterally; No rales, rhonchi, wheezing, or rubs  HEART: Regular rate and rhythm; No murmurs, rubs, or gallops  ABDOMEN: Soft, Nontender, Nondistended; Bowel sounds present  EXTREMITIES:   No clubbing, cyanosis, or edema r amputation  LYMPH: No lymphadenopathy noted  SKIN: No rashes or lesions    labs  09-11    139  |  109  |  67<HH>  ----------------------------<  106<H>  4.5   |  19  |  4.7<HH>    Ca    7.0<L>      11 Sep 2019 21:30  Phos  7.3     09-12  Mg     1.9     09-12                            6.8    7.65  )-----------( 232      ( 12 Sep 2019 05:32 )             21.7       Culture - Blood (collected 09 Sep 2019 22:00)  Source: .Blood Blood  Preliminary Report (11 Sep 2019 19:01):    No growth to date.    Culture - Blood (collected 09 Sep 2019 22:00)  Source: .Blood Blood  Preliminary Report (11 Sep 2019 19:01):    No growth to date.    Culture - Urine (collected 09 Sep 2019 20:34)  Source: .Urine Catheterized  Final Report (11 Sep 2019 20:49):    >100,000 CFU/ml Klebsiella oxytoca    >100,000 CFU/ml Kluyvera ascorbata  Organism: Klebsiella oxytoca  Kluyvera ascorbata (11 Sep 2019 20:49)  Organism: Kluyvera ascorbata (11 Sep 2019 20:49)  Organism: Klebsiella oxytoca (11 Sep 2019 20:49)              cefepime   IVPB 1000 milliGRAM(s) IV Intermittent every 24 hours  chlorhexidine 4% Liquid 1 Application(s) Topical two times a day  dextrose 5% + sodium chloride 0.45%. 1000 milliLiter(s) IV Continuous <Continuous>  flumazenil Injectable 0.5 milliGRAM(s) IV Push once PRN  heparin  Injectable 5000 Unit(s) SubCutaneous every 8 hours  lactated ringers. 1000 milliLiter(s) IV Continuous <Continuous>  morphine  - Injectable 2 milliGRAM(s) IV Push every 4 hours PRN  oxyCODONE    5 mG/acetaminophen 325 mG 1 Tablet(s) Oral every 4 hours PRN  pantoprazole    Tablet 40 milliGRAM(s) Oral before breakfast  silver sulfADIAZINE 1% Cream 1 Application(s) Topical two times a day

## 2019-09-12 NOTE — PROGRESS NOTE ADULT - SUBJECTIVE AND OBJECTIVE BOX
Patient is a 58y old  Male who presents with a chief complaint of AMS (12 Sep 2019 08:04)      Over Night Events:  Patient seen and examined.   feel much better BP improved AOX3    ROS:  See HPI    PHYSICAL EXAM    ICU Vital Signs Last 24 Hrs  T(C): 36.4 (12 Sep 2019 07:01), Max: 37.3 (11 Sep 2019 23:01)  T(F): 97.5 (12 Sep 2019 07:01), Max: 99.2 (11 Sep 2019 23:01)  HR: 83 (12 Sep 2019 06:11) (75 - 97)  BP: 105/55 (12 Sep 2019 06:11) (88/49 - 106/56)  BP(mean): 74 (12 Sep 2019 06:11) (63 - 78)  ABP: --  ABP(mean): --  RR: 16 (12 Sep 2019 07:01) (11 - 22)  SpO2: 98% (12 Sep 2019 06:11) (97% - 100%)      General: AOX3  HEENT:     chela           Lymph Nodes: NO cervical LN   Lungs: Bilateral BS  Cardiovascular: Regular   Abdomen: Soft, Positive BS  Extremities: No clubbing right AKA  Skin: warm   Neurological: no focal deficit   Musculoskeletal: move all ext     I&O's Detail    11 Sep 2019 07:01  -  12 Sep 2019 07:00  --------------------------------------------------------  IN:    dextrose 5% + sodium chloride 0.45%.: 375 mL    IV PiggyBack: 400 mL    Lactated Ringers IV Bolus: 250 mL    lactated ringers.: 1125 mL    Oral Fluid: 480 mL  Total IN: 2630 mL    OUT:    Indwelling Catheter - Suprapubic: 1425 mL  Total OUT: 1425 mL    Total NET: 1205 mL      12 Sep 2019 07:01  -  12 Sep 2019 08:38  --------------------------------------------------------  IN:  Total IN: 0 mL    OUT:    Indwelling Catheter - Suprapubic: 160 mL  Total OUT: 160 mL    Total NET: -160 mL          LABS:                          6.8    7.65  )-----------( 232      ( 12 Sep 2019 05:32 )             21.7         12 Sep 2019 05:32    139    |  110    |  66     ----------------------------<  82     4.5     |  18     |  4.7      Ca    7.1        12 Sep 2019 05:32  Phos  7.3       12 Sep 2019 05:32  Mg     1.9       12 Sep 2019 05:32                                                                                          Lactate, Blood: 0.7 mmol/L (09-10-19 @ 05:30)  Lactate, Blood: 0.7 mmol/L (09-09-19 @ 20:45)    CARDIAC MARKERS ( 11 Sep 2019 07:30 )  x     / 0.09 ng/mL / x     / x     / x      CARDIAC MARKERS ( 10 Sep 2019 15:47 )  x     / 0.08 ng/mL / x     / x     / x                                                            Culture - Blood (collected 09 Sep 2019 22:00)  Source: .Blood Blood  Preliminary Report (11 Sep 2019 19:01):    No growth to date.    Culture - Blood (collected 09 Sep 2019 22:00)  Source: .Blood Blood  Preliminary Report (11 Sep 2019 19:01):    No growth to date.    Culture - Urine (collected 09 Sep 2019 20:34)  Source: .Urine Catheterized  Final Report (11 Sep 2019 20:49):    >100,000 CFU/ml Klebsiella oxytoca    >100,000 CFU/ml Kluyvera ascorbata  Organism: Klebsiella oxytoca  Kluyvera ascorbata (11 Sep 2019 20:49)  Organism: Kluyvera ascorbata (11 Sep 2019 20:49)  Organism: Klebsiella oxytoca (11 Sep 2019 20:49)                                                                                       ABG - ( 10 Sep 2019 13:44 )  pH, Arterial: 7.27  pH, Blood: x     /  pCO2: 44    /  pO2: 134   / HCO3: 20    / Base Excess: -6.3  /  SaO2: 99                  MEDICATIONS  (STANDING):  calcium acetate 667 milliGRAM(s) Oral two times a day with meals  cefepime   IVPB 1000 milliGRAM(s) IV Intermittent every 24 hours  chlorhexidine 4% Liquid 1 Application(s) Topical two times a day  dextrose 5% + sodium chloride 0.45%. 1000 milliLiter(s) (75 mL/Hr) IV Continuous <Continuous>  heparin  Injectable 5000 Unit(s) SubCutaneous every 8 hours  lactated ringers. 1000 milliLiter(s) (75 mL/Hr) IV Continuous <Continuous>  pantoprazole    Tablet 40 milliGRAM(s) Oral before breakfast  silver sulfADIAZINE 1% Cream 1 Application(s) Topical two times a day    MEDICATIONS  (PRN):  flumazenil Injectable 0.5 milliGRAM(s) IV Push once PRN benzo overdose  morphine  - Injectable 2 milliGRAM(s) IV Push every 4 hours PRN Moderate Pain (4 - 6)  oxyCODONE    5 mG/acetaminophen 325 mG 1 Tablet(s) Oral every 4 hours PRN Severe Pain (7 - 10)          Xrays:  TLC:  OG:  ET tube:                                                                                       ECHO:  [facility] ICU:

## 2019-09-12 NOTE — PHYSICAL THERAPY INITIAL EVALUATION ADULT - GENERAL OBSERVATIONS, REHAB EVAL
14:38-15:00 Chart reviewed. Pt encountered supine in bed,  may be seen by Physical Therapist as confirmed with Nurse. Patient denied pain at rest and would like to get up to sit in chair now; +tele

## 2019-09-12 NOTE — PROGRESS NOTE ADULT - ASSESSMENT
Patient with out complaints. Echo lv function normal. Check cbc possibly transfuse. Consider outpatient dobutamine SE in 4- 6 weeks

## 2019-09-12 NOTE — DIETITIAN INITIAL EVALUATION ADULT. - OTHER INFO
58 year old male with hx of paraplegia, HTN, chronic decubitus, R BKA, DM, severe anemia, suprapubic cath presents with AMSm s/p narcan x 3 and flumazenil push. pt with hx of daily opiod and benzo use found to have elevated BUN/creat and trops. admitted with AMS/toxic encephalopathy 2/2 substance abuse-pt admits to taking extra xanax. + leukocytosis, suspect UTI. started on cefepime, followed by ID. no evidence of sepsis. s/p 2 u PRBCS. pt refuses HD. states PTA refuses to comply with dietary restrictions as well, refuses any diet education at this time.

## 2019-09-12 NOTE — PROGRESS NOTE ADULT - SUBJECTIVE AND OBJECTIVE BOX
Patient is a 58y old  Male who presents with a chief complaint of AMS (12 Sep 2019 09:51)      INTERVAL HPI/OVERNIGHT EVENTS:  ICU Vital Signs Last 24 Hrs  T(C): 36.4 (12 Sep 2019 09:50), Max: 37.3 (11 Sep 2019 23:01)  T(F): 97.5 (12 Sep 2019 09:50), Max: 99.2 (11 Sep 2019 23:01)  HR: 89 (12 Sep 2019 09:50) (76 - 97)  BP: 105/56 (12 Sep 2019 09:50) (88/49 - 121/67)  BP(mean): 83 (12 Sep 2019 09:03) (63 - 89)  ABP: --  ABP(mean): --  RR: 20 (12 Sep 2019 09:50) (11 - 22)  SpO2: 98% (12 Sep 2019 09:50) (98% - 100%)    I&O's Summary    11 Sep 2019 07:01  -  12 Sep 2019 07:00  --------------------------------------------------------  IN: 2630 mL / OUT: 1425 mL / NET: 1205 mL    12 Sep 2019 07:01  -  12 Sep 2019 10:34  --------------------------------------------------------  IN: 740 mL / OUT: 270 mL / NET: 470 mL          LABS:                        6.8    7.65  )-----------( 232      ( 12 Sep 2019 05:32 )             21.7     09-12    139  |  110  |  66<HH>  ----------------------------<  82  4.5   |  18  |  4.7<HH>    Ca    7.1<L>      12 Sep 2019 05:32  Phos  7.3     09-12  Mg     1.9     09-12          CAPILLARY BLOOD GLUCOSE        ABG - ( 10 Sep 2019 13:44 )  pH, Arterial: 7.27  pH, Blood: x     /  pCO2: 44    /  pO2: 134   / HCO3: 20    / Base Excess: -6.3  /  SaO2: 99                  RADIOLOGY & ADDITIONAL TESTS:    Consultant(s) Notes Reviewed:  [x ] YES  [ ] NO    MEDICATIONS  (STANDING):  calcium acetate 667 milliGRAM(s) Oral two times a day with meals  cefepime   IVPB 1000 milliGRAM(s) IV Intermittent every 24 hours  chlorhexidine 4% Liquid 1 Application(s) Topical two times a day  dextrose 5% + sodium chloride 0.45%. 1000 milliLiter(s) (75 mL/Hr) IV Continuous <Continuous>  heparin  Injectable 5000 Unit(s) SubCutaneous every 8 hours  lactated ringers. 1000 milliLiter(s) (75 mL/Hr) IV Continuous <Continuous>  pantoprazole    Tablet 40 milliGRAM(s) Oral before breakfast  silver sulfADIAZINE 1% Cream 1 Application(s) Topical two times a day    MEDICATIONS  (PRN):  flumazenil Injectable 0.5 milliGRAM(s) IV Push once PRN benzo overdose  morphine  - Injectable 2 milliGRAM(s) IV Push every 4 hours PRN Moderate Pain (4 - 6)  oxyCODONE    5 mG/acetaminophen 325 mG 1 Tablet(s) Oral every 4 hours PRN Severe Pain (7 - 10)      PHYSICAL EXAM:  GENERAL: well built, well nourished, resting comfortably in bed, on RA  NERVOUS SYSTEM:  AAOx3  CHEST/LUNG: clear to auscultation bilaterally, no wheezes, rales or rhonchi  HEART: S1S2 nl, holosystolic murmur appreciated  ABDOMEN: soft, nontender, nondistended, + BS  EXTREMITIES:  R BKA    Care Discussed with Consultants/Other Providers [ x] YES  [ ] NO

## 2019-09-12 NOTE — PROGRESS NOTE ADULT - ASSESSMENT
59yo paraplegic M w/ PMH of HTN, chronic decubitus ulcer (follows Dr. Gooden in [facility]), R leg amputation p/w AMS.      # Toxic encephalopathy likely due to substance abuse (benzo more likely than opioid)  - Resolved, pt is AAOx3  - UDS + for benzodiazepines, otherwise neg  - Pt states he receives his medications from Dr. Van as an outpt, consider medication review    # SILVINA on CKD stage 3 to 4  - Resolving, Cr is 4.7 (baseline 3.4)  - Per nephrology, c/w IVF, pt initiated on Ca Acetate    # Leukocytosis suspect UTI gram neg  - Urine Cx grew Klebsiella and Kluyvera  - Pt remains afebrile, WBC 8  - Per ID recs - PO Keflex 500 mg qd for 7 days    # Acute on chronic anemia likely due to CKD   - Hb 6.8 this morning, pt is asx  - s/p 2 unit pRBCs  - Monitor h/h  - Per nephro, begin weekly Epogen 10,000 U sq    # Elevated Troponin  - Troponin 0.09, EKG nl  - Per cardiology, likely cause is CKD    # HTN  - Amlodipine being held due to hypotension  - BP currently stable    # Chronic decubiti ulcers  - c/w decubitus ulcer protocol    # Acute on chronic anemia suspect of chronic disease   - PRBC transfusion  - Monitor h/h    # Functional quadriplegia   - Monitor     Activity: as tolerated  diet: renal  DVT prophylaxis: heparin subq  GI prophylaxis: PPI  full code  dispo: from home

## 2019-09-12 NOTE — PROGRESS NOTE ADULT - ASSESSMENT
1)  SILVINA on CKD likely due to hypotension.  No evidence of sepsis.  No urinary tract obstruction on CT    2)  Underlying severe renal insufficiency w/ creat mid-3.o's to 4.0 in June and July this year, felt to be chronic (eg, Stage 4 CKD)    3)  Mixed metabolic (due to SILVINA and severe CKD) and respiratory (due to CNS suppression) acidosis    4)  Mild hypomagnesemia    5)  Severe anemia, which pt states has been present for a long time, but requiring transfusions recently.  Perhaps he does have true CKD and therefore erythropoietin deficiency superimposed on perhaps some other underlying cause of anemia    . Recommendation:   Can continue IVF's as ordered, Cr improved from peak, non oliguric  Hold Amlodipine due to low BP.  - reiterates as has [facility] the past, wouldnt want dialysis    - Getting PRBC transfusion   - iron stores adequate, can start epogen 10,000 units SQ Q week  -,.Continue Calcium Acetate 2 tabs w/ each meal.

## 2019-09-12 NOTE — PROGRESS NOTE ADULT - SUBJECTIVE AND OBJECTIVE BOX
Patient is a 58y old  Male who presents with a chief complaint of AMS (12 Sep 2019 07:37)      T(F): 97.5 (09-12-19 @ 07:01), Max: 99.2 (09-11-19 @ 23:01)  HR: 83 (09-12-19 @ 06:11)  BP: 105/55 (09-12-19 @ 06:11)  RR: 16 (09-12-19 @ 07:01)  SpO2: 98% (09-12-19 @ 06:11) (97% - 100%)    PHYSICAL EXAM:  GENERAL: NAD, well-groomed, well-developed  HEAD:  Atraumatic, Normocephalic  EYES: EOMI, PERRLA, conjunctiva and sclera clear  ENMT: No tonsillar erythema, exudates, or enlargement; Moist mucous membranes, Good dentition, No lesions  NECK: Supple, No JVD, Normal thyroid  NERVOUS SYSTEM:  Alert & Oriented X3, Good concentration; Motor Strength 5/5 B/L upper and lower extremities; DTRs 2+ intact and symmetric  CHEST/LUNG: Clear to percussion bilaterally; No rales, rhonchi, wheezing, or rubs  HEART: Regular rate and rhythm; No murmurs, rubs, or gallops  ABDOMEN: Soft, Nontender, Nondistended; Bowel sounds present  EXTREMITIES:  2+ Peripheral Pulses, No clubbing, cyanosis, or edema  LYMPH: No lymphadenopathy noted  SKIN: No rashes or lesions    LABS  09-12    139  |  110  |  66<HH>  ----------------------------<  82  4.5   |  18  |  4.7<HH>    Ca    7.1<L>      12 Sep 2019 05:32  Phos  7.3     09-12  Mg     1.9     09-12                            6.8    7.65  )-----------( 232      ( 12 Sep 2019 05:32 )             21.7         CARDIAC ENZYMES      Troponin T, Serum: 0.09 ng/mL (09-11-19 @ 07:30)  Troponin T, Serum: 0.08 ng/mL (09-10-19 @ 15:47)  Troponin T, Serum: 0.08 ng/mL (09-10-19 @ 05:30)  Troponin T, Serum: 0.09 ng/mL (09-09-19 @ 20:45)      Culture Results:   No growth to date. (09-09-19)  Culture Results:   No growth to date. (09-09-19)  Culture Results:   >100,000 CFU/ml Klebsiella oxytoca  >100,000 CFU/ml Lydiaa adrwinata (09-09-19)    RADIOLOGY  < from: Xray Chest 1 View- PORTABLE-Routine (09.11.19 @ 06:25) >  Impression:      Mild left basilar subsegmental atelectasis. No definite focal   consolidation, effusion, or pneumothorax.    < end of copied text >    MEDICATIONS  (STANDING):  calcium acetate 667 milliGRAM(s) Oral two times a day with meals  cefepime   IVPB 1000 milliGRAM(s) IV Intermittent every 24 hours  chlorhexidine 4% Liquid 1 Application(s) Topical two times a day  heparin  Injectable 5000 Unit(s) SubCutaneous every 8 hours  lactated ringers. 1000 milliLiter(s) (75 mL/Hr) IV Continuous <Continuous>  pantoprazole    Tablet 40 milliGRAM(s) Oral before breakfast  silver sulfADIAZINE 1% Cream 1 Application(s) Topical two times a day    MEDICATIONS  (PRN):  flumazenil Injectable 0.5 milliGRAM(s) IV Push once PRN benzo overdose  morphine  - Injectable 2 milliGRAM(s) IV Push every 4 hours PRN Moderate Pain (4 - 6)  oxyCODONE    5 mG/acetaminophen 325 mG 1 Tablet(s) Oral every 4 hours PRN Severe Pain (7 - 10)

## 2019-09-12 NOTE — PHYSICAL THERAPY INITIAL EVALUATION ADULT - LEVEL OF INDEPENDENCE: GAIT, REHAB EVAL
secondary patient still awaiting prosthesis for RLE residual limb and has been wheelchair bound since/unable to perform

## 2019-09-12 NOTE — PHYSICAL THERAPY INITIAL EVALUATION ADULT - MANUAL MUSCLE TESTING RESULTS, REHAB EVAL
BUE ms strength grossly graded 4 to 4+/5; RLE residual limb and LLE 3- to 3/5 except for (L) foot 0/5

## 2019-09-12 NOTE — PROGRESS NOTE ADULT - ASSESSMENT
IMPRESSION:  alter mental status secondary to drug over dose opoid   SILVINA on CKD   metabolic acidosis   anemia stable   UTI   anemia chronic   PLAN:    CNS: no sedation for now     HEENT: oral care     PULMONARY: keep pox > 92 % more awake oriented     CARDIOVASCULAR:  continue IV fluid for now     GI: GI prophylaxis.  Feeding     RENAL: follow lytes renal US   renal consult   monitor Is and OS       INFECTIOUS DISEASE: pan cx follow ID , follow CX   burn follow up   HEMATOLOGICAL:  DVT prophylaxis. transfuse 1 unit PRBC now had ct scan abd 2 days ago   stool guiag   iron study   hold heparin SQ for today to assess if any bleed     ENDOCRINE:  Follow up FS.  Insulin protocol if needed.    MUSCULOSKELETAL:      CRITICAL CARE TIME SPENT: ***

## 2019-09-12 NOTE — PROGRESS NOTE ADULT - SUBJECTIVE AND OBJECTIVE BOX
MARGE BELLA  58y, Male  Allergy: sulfamethizole (Unknown)      CHIEF COMPLAINT: AMS (11 Sep 2019 22:50)      INTERVAL EVENTS/HPI  - No acute events overnight  - T(F): , Max: 99.2 (09-11-19 @ 23:01)  - Denies any worsening symptoms  - Tolerating medication    ROS  10 system review- neg     SOCIAL HISTORY    Substance Use (  ) never used  (  ) IVDU ( X ) Other:  Tobacco Usage:  (   ) never smoked   (X   ) former smoker   (   ) current smoker   Alcohol Usage: (   ) social  (   ) daily use (X   ) denies  Sexual History: not relevant       FH noncontributory     VITALS:  T(F): 98.6, Max: 99.2 (09-11-19 @ 23:01)  HR: 90  BP: 94/50  RR: 16Vital Signs Last 24 Hrs  T(C): 37 (12 Sep 2019 03:00), Max: 37.3 (11 Sep 2019 23:01)  T(F): 98.6 (12 Sep 2019 03:00), Max: 99.2 (11 Sep 2019 23:01)  HR: 90 (12 Sep 2019 02:52) (75 - 97)  BP: 94/50 (12 Sep 2019 02:52) (75/44 - 106/56)  BP(mean): 66 (12 Sep 2019 02:52) (55 - 78)  RR: 16 (12 Sep 2019 03:00) (11 - 22)  SpO2: 98% (12 Sep 2019 02:52) (97% - 100%)    PHYSICAL EXAM:  Gen: NAD, resting in bed  HEENT: Normocephalic, atraumatic  Neck: supple, no lymphadenopathy  CV: s1 s2 +  Lungs: decreased BS  Abdomen: Soft, BS present  Ext: Warm, well perfused  Neuro: non focal, awake  Skin: no rash, no erythema      TESTS & MEASUREMENTS:                        7.4    8.09  )-----------( 238      ( 11 Sep 2019 21:30 )             23.5     09-11    139  |  109  |  67<HH>  ----------------------------<  106<H>  4.5   |  19  |  4.7<HH>    Ca    7.0<L>      11 Sep 2019 21:30  Phos  8.0     09-11  Mg     1.4     09-11      eGFR if Non African American: 13 mL/min/1.73M2 (09-11-19 @ 21:30)  eGFR if : 15 mL/min/1.73M2 (09-11-19 @ 21:30)  eGFR if Non African American: 13 mL/min/1.73M2 (09-11-19 @ 07:30)  eGFR if African American: 15 mL/min/1.73M2 (09-11-19 @ 07:30)          Culture - Blood (collected 09-09-19 @ 22:00)  Source: .Blood Blood  Preliminary Report (09-11-19 @ 19:01):    No growth to date.    Culture - Blood (collected 09-09-19 @ 22:00)  Source: .Blood Blood  Preliminary Report (09-11-19 @ 19:01):    No growth to date.    Culture - Urine (collected 09-09-19 @ 20:34)  Source: .Urine Catheterized  Final Report (09-11-19 @ 20:49):    >100,000 CFU/ml Klebsiella oxytoca    >100,000 CFU/ml Kluyvera ascorbata  Organism: Klebsiella oxytoca  Kluyvera ascorbata (09-11-19 @ 20:49)  Organism: Kluyvera ascorbata (09-11-19 @ 20:49)      -  Amikacin: S <=16      -  Ampicillin: R >16 These ampicillin results predict results for amoxicillin      -  Ampicillin/Sulbactam: R >16/8 Enterobacter, Citrobacter, and Serratia may develop resistance during prolonged therapy (3-4 days)      -  Aztreonam: S <=4      -  Cefazolin: S 16      -  Cefepime: S <=4      -  Cefoxitin: S <=8      -  Ceftriaxone: S <=1 Enterobacter, Citrobacter, and Serratia may develop resistance during prolonged therapy      -  Ciprofloxacin: R >2      -  Ertapenem: S <=1      -  Gentamicin: R >8      -  Imipenem: S <=1      -  Levofloxacin: R >4      -  Meropenem: S <=1      -  Nitrofurantoin: S <=32 Should not be used to treat pyelonephritis      -  Piperacillin/Tazobactam: S <=16      -  Tigecycline: S <=2      -  Tobramycin: S <=4      -  Trimethoprim/Sulfamethoxazole: R >2/38      Method Type: ALLISON  Organism: Klebsiella oxytoca (09-11-19 @ 20:49)      -  Amikacin: S <=16      -  Ampicillin: R >16 These ampicillin results predict results for amoxicillin      -  Ampicillin/Sulbactam: S <=8/4 Enterobacter, Citrobacter, and Serratia may develop resistance during prolonged therapy (3-4 days)      -  Aztreonam: S <=4      -  Cefazolin: S <=8      -  Cefepime: S <=4      -  Cefoxitin: S <=8      -  Ceftriaxone: S <=1 Enterobacter, Citrobacter, and Serratia may develop resistance during prolonged therapy      -  Ciprofloxacin: R >2      -  Gentamicin: S <=4      -  Imipenem: S <=1      -  Levofloxacin: R >4      -  Meropenem: S <=1      -  Nitrofurantoin: S <=32 Should not be used to treat pyelonephritis      -  Piperacillin/Tazobactam: S <=16      -  Tigecycline: S <=2      -  Tobramycin: S <=4      -  Trimethoprim/Sulfamethoxazole: S <=2/38      Method Type: ALLISON        Lactate, Blood: 0.7 mmol/L (09-10-19 @ 05:30)  Blood Gas Venous - Lactate: 0.7 mmoL/L (09-10-19 @ 04:48)  Lactate, Blood: 0.7 mmol/L (09-09-19 @ 20:45)  Blood Gas Venous - Lactate: 1.0 mmoL/L (09-09-19 @ 20:06)      INFECTIOUS DISEASES TESTING  Hepatitis C Virus Interpretation: Nonreact (07-06-19 @ 07:13)  Hepatitis B Surface Antigen: Nonreact (07-06-19 @ 07:13)  Hepatitis C Virus Interpretation: Nonreact (06-04-19 @ 05:01)      RADIOLOGY & ADDITIONAL TESTS:  I have personally reviewed the last Chest xray  CXR      CT  CT Abdomen and Pelvis No Cont:   EXAM:  CT ABDOMEN AND PELVIS            PROCEDURE DATE:  09/09/2019            INTERPRETATION:  CLINICAL STATEMENT: Renal failure. UTI.      TECHNIQUE: Contiguous CT images were obtained of the abdomen and pelvis.  Intravenous Contrast: None.    Oral contrast was not administered.          COMPARISON:  CT abdomen pelvis dated 8/15/2018    FINDINGS:    Lack of intravenous and oral contrast degrades evaluation. Streak   artifact from spinal rods further degrades evaluation.    LOWER CHEST: Small bilateral pleural effusions with compressive   atelectasis. Interlobular septal thickening. Coronary artery, aortic and   mitral annular calcifications.    LIVER: Unremarkable.    SPLEEN: Unremarkable.    PANCREAS: Unremarkable.    GALLBLADDER AND BILIARY TREE: Unremarkable CT appearance of the   gallbladder. No biliary ductal dilatation.    ADRENALS: Unremarkable.    KIDNEYS: No hydronephrosis or evidence of ureteral calculus. Punctate 2   mm nonobstructing right renal calculi.    LYMPH NODES: Bilateral prominent pelvic lymph nodes redemonstrated, not   significantly changed    VASCULATURE: Normal caliber abdominal aorta with diffuse vascular   calcifications    BOWEL: No bowel obstruction. Unremarkable appendix. Underdistention   versus proximal ascending colon wall thickening.    PERITONEUM/RETROPERITONEUM/MESENTERY: There is no ascites or   pneumoperitoneum.    PELVIC VISCERA: Urinary bladder wall thickening. Bladder is collapsed   around a suprapubic catheter    BONES AND SOFT TISSUES: Chronic decubitus ulcers overlying bilateral   ischial tuberosities and sacrum with likely chronic osteitis. Chronic L1   compression fracture. Spinal rods. Muscle atrophy.    IMPRESSION:    Urinary bladder wall thickening despite collapsed around asuprapubic   catheter.  No hydronephrosis or evidence of ureteral calculus. Punctate   nonobstructing right renal calculi.    Small bilateral pleural effusions with compressive atelectasis.    Likely underdistention versus proximal ascending colonic wall thickening.   A mild colitis is not excluded.    Chronic decubitus ulcers with presumably chronic underlying osteitis.                    EDITA AMAYA M.D., ATTENDING RADIOLOGIST  This document has been electronically signed. Sep  9 2019 11:37PM             (09-09-19 @ 22:20)      CARDIOLOGY TESTING  12 Lead ECG:   Ventricular Rate 84 BPM    Atrial Rate 84 BPM    P-R Interval 160 ms    QRS Duration 82 ms    Q-T Interval 358 ms    QTC Calculation(Bezet) 423 ms    P Axis 82 degrees    R Axis 16 degrees    T Axis 77 degrees    Diagnosis Line Normal sinus rhythm  Normal ECG    Confirmed by GONSALO ERIC MD ([id number]) on 9/10/2019 11:36:59 AM (09-09-19 @ 21:42)  12 Lead ECG:   Ventricular Rate 88 BPM    Atrial Rate 88 BPM    P-R Interval 152 ms    QRS Duration 82 ms    Q-T Interval 336 ms    QTC Calculation(Bezet) 406 ms    P Axis 86 degrees    R Axis 12 degrees    T Axis 83 degrees    Diagnosis Line Normal sinus rhythm  Normal ECG    Confirmed by GONSALO ERIC MD [id number]) on 9/10/2019 11:36:57 AM (09-09-19 @ 19:49)      MEDICATIONS  cefepime   IVPB 1000  chlorhexidine 4% Liquid 1  dextrose 5% + sodium chloride 0.45%. 1000  heparin  Injectable 5000  lactated ringers. 1000  pantoprazole    Tablet 40  silver sulfADIAZINE 1% Cream 1      ANTIBIOTICS:  cefepime   IVPB 1000 milliGRAM(s) IV Intermittent every 24 hours

## 2019-09-13 NOTE — PROGRESS NOTE ADULT - ASSESSMENT
1)  SILVINA on CKD likely due to hypotension.  No evidence of sepsis.  No urinary tract obstruction on CT     Underlying severe renal insufficiency w/ creat mid-3.o's to 4.0 in June and July this year, felt to be chronic (eg, Stage 4 CKD)     Mixed metabolic (due to SILVINA and severe CKD) and respiratory (due to CNS suppression) acidosis     Severe anemia, which pt states has been present for a long time, but requiring transfusions recently.  Perhaps he does have true CKD and therefore erythropoietin deficiency superimposed on perhaps some other underlying cause of anemia    . Recommendation:   Cr improved from peak, non oliguric   d/c planning,   cautioend regardign low K diet   - Getting PRBC transfusion   - iron stores adequate,   -,.Continue Calcium Acetate 2 tabs w/ each meal.

## 2019-09-13 NOTE — PROGRESS NOTE ADULT - SUBJECTIVE AND OBJECTIVE BOX
Patient is awake and alert and wants to go home      T(F): 97.8 (09-13-19 @ 11:16), Max: 97.8 (09-13-19 @ 11:16)  HR: 82 (09-13-19 @ 11:15)  BP: 133/66 (09-13-19 @ 11:15)  RR: 18 (09-13-19 @ 11:15)  SpO2: 100% (09-13-19 @ 09:31) (97% - 100%)    PHYSICAL EXAM:  GENERAL: NAD  HEAD:  Atraumatic, Normocephalic  NERVOUS SYSTEM:  Alert & Oriented X3, quadriplegia   CHEST/LUNG: Clear to percussion bilaterally; No rales, rhonchi, wheezing, or rubs  HEART: Regular rate and rhythm; No murmurs, rubs, or gallops  ABDOMEN: Soft, sp catheter   EXTREMITIES:  R BKA    LABS  09-13    144  |  113<H>  |  63<HH>  ----------------------------<  93  4.4   |  19  |  4.5<HH>    Ca    7.6<L>      13 Sep 2019 05:28  Phos  7.5     09-13  Mg     1.8     09-13                            8.9    7.11  )-----------( 274      ( 13 Sep 2019 05:28 )             27.5         CARDIAC ENZYMES      Troponin T, Serum: 0.09 ng/mL (09-11-19 @ 07:30)  Troponin T, Serum: 0.08 ng/mL (09-10-19 @ 15:47)      Culture Results:   No growth to date. (09-11-19)  Culture Results:   No growth to date. (09-09-19)  Culture Results:   No growth to date. (09-09-19)  Culture Results:   >100,000 CFU/ml Klebsiella oxytoca  >100,000 CFU/ml Kluyvera ascorbata (09-09-19)      MEDICATIONS  (STANDING):  calcium acetate 667 milliGRAM(s) Oral two times a day with meals  cefepime   IVPB 1000 milliGRAM(s) IV Intermittent every 24 hours  chlorhexidine 4% Liquid 1 Application(s) Topical two times a day  dextrose 5% + sodium chloride 0.45%. 1000 milliLiter(s) (75 mL/Hr) IV Continuous <Continuous>  heparin  Injectable 5000 Unit(s) SubCutaneous every 8 hours  lactated ringers. 1000 milliLiter(s) (75 mL/Hr) IV Continuous <Continuous>  pantoprazole    Tablet 40 milliGRAM(s) Oral before breakfast  silver sulfADIAZINE 1% Cream 1 Application(s) Topical two times a day    MEDICATIONS  (PRN):  flumazenil Injectable 0.5 milliGRAM(s) IV Push once PRN benzo overdose  morphine  - Injectable 2 milliGRAM(s) IV Push every 4 hours PRN Moderate Pain (4 - 6)  oxyCODONE    5 mG/acetaminophen 325 mG 2 Tablet(s) Oral every 4 hours PRN Moderate Pain (4 - 6)

## 2019-09-13 NOTE — DISCHARGE NOTE NURSING/CASE MANAGEMENT/SOCIAL WORK - PATIENT PORTAL LINK FT
You can access the FollowMyHealth Patient Portal offered by [facility] by registering at the following website: [email or web address]. By joining [facility]’s FollowMyHealth portal, you will also be able to view your health information using other applications (apps) compatible with our system.

## 2019-09-13 NOTE — PROGRESS NOTE ADULT - ASSESSMENT
IMPRESSION:  alter mental status secondary to drug over dose opoid   SILVINA on CKD   metabolic acidosis   anemia stable   UTI   anemia chronic   PLAN:    CNS: no sedation for now     HEENT: oral care     PULMONARY: keep pox > 92 % more awake oriented     CARDIOVASCULAR:  continue IV fluid for now   BP stable   GI: GI prophylaxis.  Feeding     RENAL: follow lytes r  follow renal   monitor Is and OS       INFECTIOUS DISEASE: pan cx follow ID , follow CX     HEMATOLOGICAL:  DVT prophylaxis. h/h stable      ENDOCRINE:  Follow up FS.  Insulin protocol if needed.    MUSCULOSKELETAL:    transfer to floor   has chronic brown   CRITICAL CARE TIME SPENT: ***

## 2019-09-13 NOTE — PROGRESS NOTE ADULT - PROVIDER SPECIALTY LIST ADULT
Cardiology
Critical Care
Hospitalist
Infectious Disease
MICU
Nephrology
Pulmonology
Nephrology
Infectious Disease

## 2019-09-13 NOTE — PROGRESS NOTE ADULT - SUBJECTIVE AND OBJECTIVE BOX
Patient is a 58y old  Male who presents with a chief complaint of AMS (12 Sep 2019 10:33)      Over Night Events:  Patient seen and examined feel good awake eating       ROS:  See HPI    PHYSICAL EXAM    ICU Vital Signs Last 24 Hrs  T(C): 36.4 (13 Sep 2019 07:00), Max: 36.5 (12 Sep 2019 15:00)  T(F): 97.5 (13 Sep 2019 07:00), Max: 97.7 (12 Sep 2019 15:00)  HR: 77 (13 Sep 2019 07:17) (77 - 91)  BP: 128/61 (13 Sep 2019 07:17) (105/56 - 139/62)  BP(mean): 88 (13 Sep 2019 07:17) (78 - 96)  ABP: --  ABP(mean): --  RR: 12 (13 Sep 2019 07:17) (0 - 22)  SpO2: 100% (13 Sep 2019 07:17) (95% - 100%)      General: AOx3  HEENT:    chela            Lymph Nodes: NO cervical LN   Lungs: Bilateral BS  Cardiovascular: Regular   Abdomen: Soft, Positive BS  Extremities: No clubbing   Skin: warm   Neurological: no focal deficit   Musculoskeletal: move all ext     I&O's Detail    12 Sep 2019 07:01  -  13 Sep 2019 07:00  --------------------------------------------------------  IN:    Oral Fluid: 240 mL    Packed Red Blood Cells: 500 mL  Total IN: 740 mL    OUT:    Indwelling Catheter - Suprapubic: 1770 mL  Total OUT: 1770 mL    Total NET: -1030 mL          LABS:                          8.9    7.11  )-----------( 274      ( 13 Sep 2019 05:28 )             27.5         13 Sep 2019 05:28    144    |  113    |  63     ----------------------------<  93     4.4     |  19     |  4.5      Ca    7.6        13 Sep 2019 05:28  Phos  7.5       13 Sep 2019 05:28  Mg     1.8       13 Sep 2019 05:28                                                                                                                                                  Culture - Blood (collected 11 Sep 2019 07:30)  Source: .Blood Blood  Preliminary Report (12 Sep 2019 18:01):    No growth to date.                                                                                           MEDICATIONS  (STANDING):  calcium acetate 667 milliGRAM(s) Oral two times a day with meals  cefepime   IVPB 1000 milliGRAM(s) IV Intermittent every 24 hours  chlorhexidine 4% Liquid 1 Application(s) Topical two times a day  dextrose 5% + sodium chloride 0.45%. 1000 milliLiter(s) (75 mL/Hr) IV Continuous <Continuous>  heparin  Injectable 5000 Unit(s) SubCutaneous every 8 hours  lactated ringers. 1000 milliLiter(s) (75 mL/Hr) IV Continuous <Continuous>  pantoprazole    Tablet 40 milliGRAM(s) Oral before breakfast  silver sulfADIAZINE 1% Cream 1 Application(s) Topical two times a day    MEDICATIONS  (PRN):  flumazenil Injectable 0.5 milliGRAM(s) IV Push once PRN benzo overdose  morphine  - Injectable 2 milliGRAM(s) IV Push every 4 hours PRN Moderate Pain (4 - 6)  oxyCODONE    5 mG/acetaminophen 325 mG 2 Tablet(s) Oral every 4 hours PRN Moderate Pain (4 - 6)          Xrays:  TLC:  OG:  ET tube:                                                                                       ECHO:  CAM ICU:

## 2019-09-13 NOTE — PROGRESS NOTE ADULT - SUBJECTIVE AND OBJECTIVE BOX
Nephrology progress note    Patient was seen and examined, events over the last 24 h noted .  Cr stabel  Feelign well    Allergies:  sulfamethizole (Unknown)    Hospital Medications:   MEDICATIONS  (STANDING):  calcium acetate 667 milliGRAM(s) Oral two times a day with meals  cefepime   IVPB 1000 milliGRAM(s) IV Intermittent every 24 hours  chlorhexidine 4% Liquid 1 Application(s) Topical two times a day  dextrose 5% + sodium chloride 0.45%. 1000 milliLiter(s) (75 mL/Hr) IV Continuous <Continuous>  heparin  Injectable 5000 Unit(s) SubCutaneous every 8 hours  lactated ringers. 1000 milliLiter(s) (75 mL/Hr) IV Continuous <Continuous>  pantoprazole    Tablet 40 milliGRAM(s) Oral before breakfast  silver sulfADIAZINE 1% Cream 1 Application(s) Topical two times a day        VITALS:  T(F): 97.5 (19 @ 07:00), Max: 97.7 (19 @ 15:00)  HR: 77 (19 @ 07:17)  BP: 158/75 (19 @ 09:00)  RR: 14 (19 @ 09:00)  SpO2: 100% (19 @ 07:17)  Wt(kg): --     @ 07:  -   @ 07:00  --------------------------------------------------------  IN: 2630 mL / OUT: 1425 mL / NET: 1205 mL     @ 07:01  -   @ 07:00  --------------------------------------------------------  IN: 740 mL / OUT: 1770 mL / NET: -1030 mL          PHYSICAL EXAM:  Constitutional: NAD  HEENT: anicteric sclera, oropharynx clear, MMM  Neck: No JVD  Respiratory: CTAB, no wheezes, rales or rhonchi  Cardiovascular: S1, S2, RRR  Gastrointestinal: BS+, soft, NT/ND  Extremities: No cyanosis or clubbing. No peripheral edema  :  No brown.   Skin: No rashes    LABS:      144  |  113<H>  |  63<HH>  ----------------------------<  93  4.4   |  19  |  4.5<HH>    Ca    7.6<L>      13 Sep 2019 05:28  Phos  7.5       Mg     1.8                                 8.9    7.11  )-----------( 274      ( 13 Sep 2019 05:28 )             27.5       Urine Studies:  Urinalysis Basic - ( 09 Sep 2019 20:34 )    Color: Blue / Appearance: Cloudy / S.025 / pH:   Gluc:  / Ketone: Negative  / Bili: Moderate / Urobili: 0.2 mg/dL   Blood:  / Protein: >=300 mg/dL / Nitrite: Negative   Leuk Esterase: Small / RBC: 6-10 /HPF / WBC 10-25 /HPF   Sq Epi:  / Non Sq Epi: Few /HPF / Bacteria: TNTC /HPF      Sodium, Random Urine: 45.0 mmoL/L (09-10 @ 21:00)  Osmolality, Random Urine: 323 mos/kg (09-10 @ 21:00)  Potassium, Random Urine: 20 mmol/L (09-10 @ 21:00)  Creatinine, Random Urine: 48 mg/dL (09-10 @ 21:00)  Sodium, Random Urine: 33.0 mmoL/L (09-10 @ 07:30)  Creatinine, Random Urine: 58 mg/dL (09-10 @ 07:30)    RADIOLOGY & ADDITIONAL STUDIES:

## 2019-09-13 NOTE — PROGRESS NOTE ADULT - ASSESSMENT
Patient is a 57 yo male with pmh of lumbar compression fracture at the age of 24, resulting in parapalegia/ wheelchair bound with subsequent sacral decubiti and heel ulcers s/p debridements in the past and currently(managed by Dr. Gooden), PAD, suprapubic cath, chronic pain, neuropathy, and DM2  s/p BKA                                  		   presented on admission   obtunded, not waking up. As per ED physician, pt woke up after 1st Narcan push, and even more awake after IV flumazenil ,  Pt reports taking 3-4 pills of 2mg xanax at night. Pt was found to have SILVINA of cr of 5.5 on ckd IV      AMS 2/2 substance abuse (benzo more likely than opioid)  - resolved   - Pt takes a lot of benzo and opioids as per home med hx. Reports taking 3-4 pills of 2mg xanax at night  - + great response from flumazenil push  - now at normal mental status  - dc home today, 36min spent on dc    Anemia   - chronic   -s/p  transfusion of 2  units prbc   - out patient hematology f/u     SILVINA on CKD IV  - no HD as per pt  - appreciate nephrology  - out pt f/u    UTI   - oral keflex as per ID      HTN  - c/w amlodipine

## 2019-09-13 NOTE — DISCHARGE NOTE PROVIDER - HOSPITAL COURSE
59yo paraplegic M w/ PMH of HTN, chronic decubitus ulcer (follows Dr. Gooden in [facility]), R leg amputation p/w AMS. As per family, when they tried to pt up tonight, he was not waking up. Pt takes a lot of opioids and benzodiazepines as per home med hx. In ED, pt was still obtunded, not waking up. As per ED physician, pt woke up after 1st narcan push, but became obtunded again. Pt had a great relief after flumazenil push, waking up and alert. Pt reports having 3-4 pills of 2mg xanax at night. Pt was found to have SILVINA of cr of 5.5 (baseline 3.4). Elevated trop of 0.09 noted. VBG shows pH 7.09, CO2 42, HCO3 13. Bicarb drip started. Pt denies any fever/chills, CP, SOB, cough, abd pain, N/V/D, swelling, head trauma, or LOC.             Patient treated for likely benzo overdose, given improvement with treatment of flumazenil,  Patient was also very acidotic on admission likely secondary to respiratory depression experienced from over sedation.  Did not require intubation and improved when he became less sedated.  Patient will be discharged with follow up with nephrology for worsening kidney function.  Patient also found to be anemic which is now at his baseline.  Follow up with primary for pain control recommended.  Narcotic medication reduced as well as benzo dced for now.

## 2019-10-11 NOTE — ED PROVIDER NOTE - PROGRESS NOTE DETAILS
ATTENDING NOTE: Pt brought in with AMS, initially evaluated by [facility] ambulance. FS was 33. Pt’s mental status improved with glucose in ED. In ED, pt was somewhat lethargic but responding to verbal questions. Neuro- intact. Neck supple. Denies fever and trauma. Labs and EKG ordered. Will continue dextrose infusion in ED. Of note, pt is on no meds known to cause hypoglycemia, but is on opiates and benzos. pt with extensive decubiti with purulent drainage, rectal temp 93.7, will treat for sepsis, given narcan with some response (was not retaining on gas), d/w Dr Cruz who requests repeat VBG, agrees with current management pt seen by Dr Barnes ICU, requests intubation, central line placed, will premedicate with bicarb

## 2019-10-11 NOTE — H&P ADULT - NSHPPHYSICALEXAM_GEN_ALL_CORE
PHYSICAL EXAM:  T(C): 34.3 (10-11-19 @ 21:12), Max: 34.3 (10-11-19 @ 21:12)  HR: 69 (10-11-19 @ 21:12) (69 - 74)  BP: 115/59 (10-11-19 @ 19:07) (115/59 - 115/59)  RR: 25 (10-11-19 @ 19:07) (25 - 25)  SpO2: 99% (10-11-19 @ 19:07) (99% - 99%)  Wt(kg): --  I&O's Summary      General Appearance: chronically ill appearing, unresponsive to verbal stimuli, responds minimally to tactile stimuli. 	  Neck: normal JVP, no bruit.   Eyes: No xanthomalasia, pupils barely responsive to light.   Cardiovascular: regular rate and rhythm S1 S2, No JVD, No murmurs, No edema  Respiratory: Lungs clear to auscultation, poor respiratory rate (around 6 per minute by visual estimation)	  Psychiatry: Alert and oriented x0.   Gastrointestinal:  Soft, Non-tender  Skin/Integumen: multiple sol of decubiti along the posterior of both lower extremities, torso and flank. 	  Neurologic: unresponsive to verbal and light tactile stimuli. responds to painful stimuli but not in a purposeful way.   Musculoskeletal/ extremities: unable to partake in active ROM. Right lower extremity amputation  Vascular: Peripheral pulses palpable 2+ bilaterally radial,

## 2019-10-11 NOTE — H&P ADULT - NSHPLABSRESULTS_GEN_ALL_CORE
LABS:	 	                          8.8    15.96 )-----------( 313      ( 11 Oct 2019 18:50 )             28.5     10-11    130<L>  |  100  |  82<HH>  ----------------------------<  116<H>  6.6<HH>   |  13<L>  |  5.7<HH>    Ca    7.0<L>      11 Oct 2019 18:50  Mg     1.3     10-11    TPro  5.9<L>  /  Alb  2.0<L>  /  TBili  0.2  /  DBili  x   /  AST  17  /  ALT  17  /  AlkPhos  174<H>  10-11    CARDIAC MARKERS ( 11 Oct 2019 18:50 )  x     / 0.11 ng/mL / x     / x     / x

## 2019-10-11 NOTE — ED PROVIDER NOTE - PHYSICAL EXAMINATION
Physical Exam    Vital Signs: I have reviewed the initial vital signs.  Constitutional: well-nourished, appears stated age, no acute distress  Eyes: Conjunctiva pink, Sclera clear, PERRL  Cardiovascular: S1 and S2, regular rate, regular rhythm, well-perfused extremities, radial pulses equal and 2+  Respiratory: unlabored respiratory effort, clear to auscultation bilaterally no wheezing, rales and rhonchi  Gastrointestinal: soft, non-tender abdomen, no pulsatile mass, normal bowl sounds  Musculoskeletal: supple neck, no lower extremity edema, no midline tenderness  Integumentary: warm, dry, no rash  Neurologic:  sleeping but arousal, follows some commands, moves extremities spontaneously, opens eyes spontaneously .

## 2019-10-11 NOTE — ED PROVIDER NOTE - OBJECTIVE STATEMENT
59 yo male, pmh of Anemia, dm, htn, paraplegia, pressure ulcer, suprapubic catheter, right BKA, presents to ed via ems from home for hypoglycemia and unresponsiveness. As per pt family, states pt uses xanax and oxycodone. Family found pt at home unresponsive, FS in field was 38, in ed s/p dextrose 120s.

## 2019-10-11 NOTE — H&P ADULT - HISTORY OF PRESENT ILLNESS
58yM Pmhx of PVD s/p R AKA, paraplegia with multiple chronic bedsores and suprapubic catheter presents for an episode of unresponsiveness witnessed by family members. En route to the ER found to be hypoglycemic, given IV dextrose. In the ER found to be acidotic and continuously unresponsive , even with administration of narcan. Of note, he was recently admitted last month for a similar episode of unresponsiveness which responded to flumazenil and narcan. He revealed at that time that he had taken three xanax of 2 mg. He sees Dr. Gooden for his chronic decubiti. 58yM Pmhx of PVD s/p R AKA, paraplegia with multiple chronic bedsores and suprapubic catheter presents for an episode of unresponsiveness witnessed by family members. En route to the ER found to be hypoglycemic, given IV dextrose. In the ER found to be acidotic and continuously unresponsive , even with administration of narcan. Of note, he was recently admitted last month for a similar episode of unresponsiveness which responded to flumazenil and narcan. He revealed at that time that he had taken three xanax of 2 mg. He sees Dr. Gooden for his chronic decubiti. central line was placed by ed team and he was intubated for airway protection and hypercapnia

## 2019-10-11 NOTE — H&P ADULT - ASSESSMENT
Altered mental status due to mixed respiratory and metabolic acidosis secondary to suspected intoxication with benzodiazepines and/ or narcotics. Suspect mainly benzo overdose given hypothermia, respiratory depression, central nervous system depression, level of arousal and reactivity of pupils.     - admit to ICU  - intubate for airway protection  - avoid narcotics/ benzos to further assess neurological status  - considering hypoglycemia, will conduct frequent fingersticks once admitted to the ICU  - administer one amp of bicarbonate prior to intubation, will repeat bmp q4h  - opting against administration of flumazenil in light of chronic use of benzodiazepines and controversial nature of medication with respect to rebound seizures.   - hyperventilaiton settings.   - ABG q shift and 1/2 hour after intubation  - bicarb drip at 150/hr    Underlying aspiration pneumonia/ suspected chronic osteomyelitis  - meropenem and vancomycin  - pan culture  - ID consultation  - vent settings to help with acid-base imbalance    SILVINA on Chronic renal insufficiency  - likely prerenal  - will continue to monitor bmp  - consider renal consultation if worsens or if patient becomes oliguric    chronic anemia  - likely secondary to CKD  - monitor CBC    Hyperkalemia  - likely secondary SILVINA and acidosis  - will repeat bmp  - can give insulin/ d50 if necessary    Elevated cardiac biomarker  - likely secondary to renal insufficency and metabolic condition, however given poor functional status of the patient, underlying silent ischemia cannot be ruled out.     DVT and GI prophyllaxis  - protonix and heparin    Hypomagnesemia  - replete and recheck metabolic encephalopathy Altered mental status due to mixed respiratory and metabolic acidosis secondary to suspected intoxication with benzodiazepines and/ or narcotics. Suspect mainly benzo overdose given hypothermia, respiratory depression, central nervous system depression, level of arousal and reactivity of pupils.     - admit to ICU  - ABG now   - sedation as needed will try precedex   - considering hypoglycemia, will conduct frequent fingersticks once admitted to the ICU on D5wand bicarb   - opting against administration of flumazenil in light of chronic use of benzodiazepines and controversial nature of medication with respect to rebound seizures.   - bicarb drip at 150/hr    Underlying aspiration pneumonia/ suspected chronic osteomyelitis  - meropenem and vancomycin  - pan culture  - ID consultation  - vent settings to help with acid-base imbalance  burn evaluation     SILVINA on Chronic renal insufficiency/hyperkalemia   - likely prerenal  - will continue to monitor bmp  - renal consult   renal and bladder US   ed team discussed case with renal   BMP no w  continue bicarb drip     chronic anemia  - likely secondary to CKD  - monitor CBC    Hyperkalemia  - likely secondary SILVINA and acidosis  - will repeat bmp  - can give insulin/ d50 if necessary    Elevated cardiac biomarker  - likely secondary to renal insufficency and metabolic condition, however given poor functional status of the patient, underlying silent ischemia cannot be ruled out.     DVT and GI prophyllaxis  - protonix and heparin    Hypomagnesemia  - replete and recheck  monitor FS Q 1 hrs   poor prognosis acute resp failure hypercapnia secondary to metabolic encephalopathy Altered mental status due to mixed respiratory and metabolic acidosis secondary to suspected intoxication with benzodiazepines and/ or narcotics. Suspect mainly benzo overdose given hypothermia, respiratory depression, central nervous system depression, level of arousal and reactivity of pupils.     - admit to ICU  - ABG now   - sedation as needed will try precedex   - considering hypoglycemia, will conduct frequent fingersticks once admitted to the ICU on D5wand bicarb   - opting against administration of flumazenil in light of chronic use of benzodiazepines and controversial nature of medication with respect to rebound seizures.   - bicarb drip at 150/hr    Underlying aspiration pneumonia/ suspected chronic osteomyelitis  - meropenem and vancomycin  - pan culture  - ID consultation  - vent settings to help with acid-base imbalance  burn evaluation     SILVINA on Chronic renal insufficiency/hyperkalemia   - likely prerenal  - will continue to monitor bmp  - renal consult   renal and bladder US   ed team discussed case with renal   BMP no w  continue bicarb drip     chronic anemia  - likely secondary to CKD  - monitor CBC    Hyperkalemia  - likely secondary SILVINA and acidosis  - will repeat bmp  - can give insulin/ d50 if necessary    Elevated cardiac biomarker  - likely secondary to renal insufficency and metabolic condition, however given poor functional status of the patient, underlying silent ischemia cannot be ruled out.     DVT and GI prophyllaxis  - protonix and heparin    Hypomagnesemia  - replete and recheck  monitor FS Q 1 hrs   poor prognosis

## 2019-10-11 NOTE — ED PROCEDURE NOTE - CPROC ED INFUS LINE DETAIL1
The location was identified, and the area was draped and prepped./The guidewire was recovered./All lumen(s) aspirated and flushed without difficulty./Ultrasound guidance was used during placement./The catheter was placed using sterile technique.

## 2019-10-11 NOTE — ED PROVIDER NOTE - CLINICAL SUMMARY MEDICAL DECISION MAKING FREE TEXT BOX
Patient bibems altered, hypoglycemic, subsequently noted to be hypothermic  with decubiti with purulent discharge, treated for sepsis, also with SILVINA with hyperkalemia and acidosis, placed on bicarb drip, intubated, renal and ICU consulted, admitted ICU

## 2019-10-11 NOTE — ED PROVIDER NOTE - SECONDARY DIAGNOSIS.
Hyperkalemia Altered mental status, unspecified altered mental status type Sepsis, due to unspecified organism, unspecified whether acute organ dysfunction present Decubitus ulcer, stage 4 with infection

## 2019-10-11 NOTE — ED PROVIDER NOTE - ATTENDING CONTRIBUTION TO CARE
ATTENDING NOTE: Pt brought in with AMS, initially evaluated by [facility] ambulance. FS was 33. Pt’s mental status improved with glucose in ED. In ED, pt was somewhat lethargic but responding to verbal questions. Neuro- intact. Neck supple. Denies fever and trauma. Labs and EKG ordered. Will continue dextrose infusion in ED. Of note, pt is on no meds known to cause hypoglycemia, but is on opiates and benzos.

## 2019-10-11 NOTE — ED PROCEDURE NOTE - CPROC ED POST PROC CARE GUIDE1
Instructed patient/caregiver to follow-up with primary care physician./Keep the cast/splint/dressing clean and dry./Verbal/written post procedure instructions were given to patient/caregiver./Instructed patient/caregiver regarding signs and symptoms of infection.
Verbal/written post procedure instructions were given to patient/caregiver.

## 2019-10-11 NOTE — ED PROVIDER NOTE - CARE PLAN
Principal Discharge DX:	Acute renal failure superimposed on chronic kidney disease, unspecified CKD stage, unspecified acute renal failure type  Secondary Diagnosis:	Hyperkalemia  Secondary Diagnosis:	Altered mental status, unspecified altered mental status type  Secondary Diagnosis:	Sepsis, due to unspecified organism, unspecified whether acute organ dysfunction present  Secondary Diagnosis:	Decubitus ulcer, stage 4 with infection

## 2019-10-11 NOTE — ED PROVIDER NOTE - UNABLE TO OBTAIN
I am unable to obtain a comprehensive history, review of systems, past medical history, and/or physical exam due to constraints imposed by the urgency of the patient's clinical condition and/or mental status. Severe Illness/Injury

## 2019-10-12 NOTE — CONSULT NOTE ADULT - SUBJECTIVE AND OBJECTIVE BOX
MARGE BELLA  58y, Male  Allergy: sulfamethizole (Unknown)      CHIEF COMPLAINT: unresponsive at home (12 Oct 2019 08:24)      HPI:  58 M Pmhx of PVD s/p R AKA, paraplegia with multiple chronic bedsores and suprapubic catheter presents for an episode of unresponsiveness witnessed by family members. En route to the ER found to be hypoglycemic, given IV dextrose. In the ER found to be acidotic and continuously unresponsive , even with administration of narcan. Of note, he was recently admitted last month for a similar episode of unresponsiveness which responded to flumazenil and narcan. He revealed at that time that he had taken three xanax of 2 mg. He sees Dr. Gooden for his chronic decubiti. central line was placed by ed team and he was intubated for airway protection and hypercapnia (11 Oct 2019 22:57)    PAST MEDICAL & SURGICAL HISTORY:  Anemia  PAD (peripheral artery disease)  Hypertension  Suprapubic catheter  Pressure ulcer  Diabetes  Lumbar compression fracture  S/P below knee amputation, right  S/P debridement  Toe amputation status, right  H/O hernia repair      FAMILY HISTORY  No pertinent family history in first degree relatives      SOCIAL HISTORY  Social History (marital status, living situation, occupation, tobacco use, alcohol and drug use, and sexual history): unable to obtain due to patient condition    ROS  unable to obtain history secondary to patient's mental status and/or sedation     VITALS:  T(F): 98.7, Max: 99.5 (10-12-19 @ 14:00)  HR: 83  BP: 110/59  RR: 19Vital Signs Last 24 Hrs  T(C): 37.1 (12 Oct 2019 15:55), Max: 37.5 (12 Oct 2019 14:00)  T(F): 98.7 (12 Oct 2019 15:55), Max: 99.5 (12 Oct 2019 14:00)  HR: 83 (12 Oct 2019 16:30) (69 - 132)  BP: 110/59 (12 Oct 2019 16:30) (61/41 - 181/97)  BP(mean): 81 (12 Oct 2019 16:30) (46 - 100)  RR: 19 (12 Oct 2019 17:00) (13 - 45)  SpO2: 99% (12 Oct 2019 17:00) (89% - 100%)    PHYSICAL EXAM:  Gen: intubated  HEENT: Normocephalic, atraumatic  Neck: supple, no lymphadenopathy  CV: Regular rate & regular rhythm  Lungs: decreased BS at bases, no fremitus  Abdomen: Soft, BS present, SPC  Ext: Warm, well perfused  Neuro: non focal  Skin: no rash, no erythema  Lines: no phlebitis    TESTS & MEASUREMENTS:                        7.1    11.39 )-----------( 260      ( 12 Oct 2019 15:21 )             21.6     10-12    135  |  95<L>  |  66<HH>  ----------------------------<  84  4.6   |  23  |  4.8<HH>    Ca    5.9<LL>      12 Oct 2019 15:21  Mg     1.2     10-12    TPro  4.8<L>  /  Alb  1.6<L>  /  TBili  <0.2  /  DBili  <0.2  /  AST  13  /  ALT  13  /  AlkPhos  146<H>  10-12    eGFR if Non African American: 13 mL/min/1.73M2 (10-12-19 @ 15:21)  eGFR if : 15 mL/min/1.73M2 (10-12-19 @ 15:21)  eGFR if Non African American: 13 mL/min/1.73M2 (10-12-19 @ 07:39)  eGFR if African American: 15 mL/min/1.73M2 (10-12-19 @ 07:39)  eGFR if Non African American: 12 mL/min/1.73M2 (10-12-19 @ 04:16)  eGFR if African American: 14 mL/min/1.73M2 (10-12-19 @ 04:16)  eGFR if Non African American: 10 mL/min/1.73M2 (10-11-19 @ 18:50)  eGFR if : 12 mL/min/1.73M2 (10-11-19 @ 18:50)    LIVER FUNCTIONS - ( 12 Oct 2019 15:21 )  Alb: 1.6 g/dL / Pro: 4.8 g/dL / ALK PHOS: 146 U/L / ALT: 13 U/L / AST: 13 U/L / GGT: x                 Blood Gas Venous - Lactate: 0.8 mmoL/L (10-11-19 @ 21:30)  Blood Gas Venous - Lactate: 0.6 mmoL/L (10-11-19 @ 18:45)      INFECTIOUS DISEASES TESTING  Hepatitis C Virus Interpretation: Nonreact (07-06-19 @ 07:13)  Hepatitis B Surface Antigen: Nonreact (07-06-19 @ 07:13)  Hepatitis C Virus Interpretation: Nonreact (06-04-19 @ 05:01)      RADIOLOGY & ADDITIONAL TESTS:  I have personally reviewed the last Chest xray  CXR      CT      CARDIOLOGY TESTING  12 Lead ECG:   Ventricular Rate 71 BPM    Atrial Rate 71 BPM    P-R Interval 192 ms    QRS Duration 84 ms    Q-T Interval 484 ms    QTC Calculation(Bezet) 525 ms    P Axis 24 degrees    R Axis -19 degrees    T Axis 49 degrees    Diagnosis Line Sinus rhythm with Premature atrial complexes with Aberrant conduction  Low voltage QRS  Prolonged QT  Nonspecific ST and T wave abnormality  Abnormal ECG    Confirmed by AMMON TORREZ MD ([id number]) on 10/12/2019 1:23:14 PM (10-12-19 @ 07:32)  12 Lead ECG:   Ventricular Rate 72 BPM    Atrial Rate 72 BPM    P-R Interval 168 ms    QRS Duration 106 ms    Q-T Interval 440 ms    QTC Calculation(Bezet) 481 ms    P Axis 13 degrees    R Axis -34 degrees    T Axis 58 degrees    Diagnosis Line Normal sinus rhythm  Left axis deviation  Prolonged QT  Poor R wave progression  Abnormal ECG    Confirmed by AMMON TORREZ MD ([id number]) on 10/12/2019 1:29:00 PM (10-11-19 @ 19:03)      MEDICATIONS  calcium gluconate IVPB 1  dexmedetomidine Infusion 0.2  dextrose 5% 1000  dextrose 5% + sodium chloride 0.45%. 1000  dextrose 5%. 1000  fentaNYL   Infusion. 0.5  gabapentin 800  heparin  Injectable 5000  meropenem  IVPB 500  norepinephrine Infusion 0.05  sodium chloride 0.9% Bolus 500  vancomycin  IVPB 500      ANTIBIOTICS:  meropenem  IVPB 500 milliGRAM(s) IV Intermittent every 12 hours  vancomycin  IVPB 500 milliGRAM(s) IV Intermittent every 24 hours      ALLERGIES:  sulfamethizole (Unknown) MARGE BELLA  58y, Male  Allergy: sulfamethizole (Unknown)      CHIEF COMPLAINT: unresponsive at home (12 Oct 2019 08:24)      HPI:  58 M Pmhx of PVD s/p R AKA, paraplegia with multiple chronic bedsores and suprapubic catheter presents for an episode of unresponsiveness witnessed by family members. En route to the ER found to be hypoglycemic, given IV dextrose. In the ER found to be acidotic and continuously unresponsive , even with administration of narcan. Of note, he was recently admitted last month for a similar episode of unresponsiveness which responded to flumazenil and narcan. He revealed at that time that he had taken three xanax of 2 mg. He sees Dr. Gooden for his chronic decubiti. central line was placed by ed team and he was intubated for airway protection and hypercapnia (11 Oct 2019 22:57)    PAST MEDICAL & SURGICAL HISTORY:  Anemia  PAD (peripheral artery disease)  Hypertension  Suprapubic catheter  Pressure ulcer  Diabetes  Lumbar compression fracture  S/P below knee amputation, right  S/P debridement  Toe amputation status, right  H/O hernia repair      FAMILY HISTORY  No pertinent family history in first degree relatives      SOCIAL HISTORY  Social History (marital status, living situation, occupation, tobacco use, alcohol and drug use, and sexual history): unable to obtain due to patient condition    ROS  unable to obtain history secondary to patient's mental status and/or sedation     VITALS:  T(F): 98.7, Max: 99.5 (10-12-19 @ 14:00)  HR: 83  BP: 110/59  RR: 19Vital Signs Last 24 Hrs  T(C): 37.1 (12 Oct 2019 15:55), Max: 37.5 (12 Oct 2019 14:00)  T(F): 98.7 (12 Oct 2019 15:55), Max: 99.5 (12 Oct 2019 14:00)  HR: 83 (12 Oct 2019 16:30) (69 - 132)  BP: 110/59 (12 Oct 2019 16:30) (61/41 - 181/97)  BP(mean): 81 (12 Oct 2019 16:30) (46 - 100)  RR: 19 (12 Oct 2019 17:00) (13 - 45)  SpO2: 99% (12 Oct 2019 17:00) (89% - 100%)    PHYSICAL EXAM:  Gen: intubated  HEENT: Normocephalic, atraumatic  Neck: supple, no lymphadenopathy  CV: Regular rate & regular rhythm  Lungs: decreased BS at bases, no fremitus  Abdomen: Soft, BS present, SPC  Ext: Warm, well perfused, L heel necrotic purulent ulcer, R AKA  sacral decub clean  Neuro: non focal, awake  Skin: no rash, no erythema  Lines: no phlebitis    TESTS & MEASUREMENTS:                        7.1    11.39 )-----------( 260      ( 12 Oct 2019 15:21 )             21.6     10-12    135  |  95<L>  |  66<HH>  ----------------------------<  84  4.6   |  23  |  4.8<HH>    Ca    5.9<LL>      12 Oct 2019 15:21  Mg     1.2     10-12    TPro  4.8<L>  /  Alb  1.6<L>  /  TBili  <0.2  /  DBili  <0.2  /  AST  13  /  ALT  13  /  AlkPhos  146<H>  10-12    eGFR if Non African American: 13 mL/min/1.73M2 (10-12-19 @ 15:21)  eGFR if : 15 mL/min/1.73M2 (10-12-19 @ 15:21)  eGFR if Non African American: 13 mL/min/1.73M2 (10-12-19 @ 07:39)  eGFR if African American: 15 mL/min/1.73M2 (10-12-19 @ 07:39)  eGFR if Non African American: 12 mL/min/1.73M2 (10-12-19 @ 04:16)  eGFR if African American: 14 mL/min/1.73M2 (10-12-19 @ 04:16)  eGFR if Non African American: 10 mL/min/1.73M2 (10-11-19 @ 18:50)  eGFR if : 12 mL/min/1.73M2 (10-11-19 @ 18:50)    LIVER FUNCTIONS - ( 12 Oct 2019 15:21 )  Alb: 1.6 g/dL / Pro: 4.8 g/dL / ALK PHOS: 146 U/L / ALT: 13 U/L / AST: 13 U/L / GGT: x                 Blood Gas Venous - Lactate: 0.8 mmoL/L (10-11-19 @ 21:30)  Blood Gas Venous - Lactate: 0.6 mmoL/L (10-11-19 @ 18:45)      INFECTIOUS DISEASES TESTING  Hepatitis C Virus Interpretation: Nonreact (07-06-19 @ 07:13)  Hepatitis B Surface Antigen: Nonreact (07-06-19 @ 07:13)  Hepatitis C Virus Interpretation: Nonreact (06-04-19 @ 05:01)      RADIOLOGY & ADDITIONAL TESTS:  I have personally reviewed the last Chest xray  CXR      CT      CARDIOLOGY TESTING  12 Lead ECG:   Ventricular Rate 71 BPM    Atrial Rate 71 BPM    P-R Interval 192 ms    QRS Duration 84 ms    Q-T Interval 484 ms    QTC Calculation(Bezet) 525 ms    P Axis 24 degrees    R Axis -19 degrees    T Axis 49 degrees    Diagnosis Line Sinus rhythm with Premature atrial complexes with Aberrant conduction  Low voltage QRS  Prolonged QT  Nonspecific ST and T wave abnormality  Abnormal ECG    Confirmed by AMMON TORREZ MD ([id number]) on 10/12/2019 1:23:14 PM (10-12-19 @ 07:32)  12 Lead ECG:   Ventricular Rate 72 BPM    Atrial Rate 72 BPM    P-R Interval 168 ms    QRS Duration 106 ms    Q-T Interval 440 ms    QTC Calculation(Bezet) 481 ms    P Axis 13 degrees    R Axis -34 degrees    T Axis 58 degrees    Diagnosis Line Normal sinus rhythm  Left axis deviation  Prolonged QT  Poor R wave progression  Abnormal ECG    Confirmed by AMMON TORREZ MD ([id number]) on 10/12/2019 1:29:00 PM (10-11-19 @ 19:03)      MEDICATIONS  calcium gluconate IVPB 1  dexmedetomidine Infusion 0.2  dextrose 5% 1000  dextrose 5% + sodium chloride 0.45%. 1000  dextrose 5%. 1000  fentaNYL   Infusion. 0.5  gabapentin 800  heparin  Injectable 5000  meropenem  IVPB 500  norepinephrine Infusion 0.05  sodium chloride 0.9% Bolus 500  vancomycin  IVPB 500      ANTIBIOTICS:  meropenem  IVPB 500 milliGRAM(s) IV Intermittent every 12 hours  vancomycin  IVPB 500 milliGRAM(s) IV Intermittent every 24 hours      ALLERGIES:  sulfamethizole (Unknown)

## 2019-10-12 NOTE — CONSULT NOTE ADULT - ASSESSMENT
ASSESSMENT  58 M Pmhx of PVD s/p R AKA, paraplegia with multiple chronic bedsores and suprapubic catheter, previous hospitalization for AMS that responded to flumazenil and narcan, presents for an episode of unresponsiveness witnessed by family members found to be hypoglycemic    #Aspiration PNA, RLL opacity on CXR    hypothermic on admission, does not meet sepsis criteria  #Acute resp failure hypercapnia secondary to metabolic encephalopathy   #Hyponatremia, resolved  #Previous ucx 9/2019 kleb (R fluoro), edouarda (R bactrim, fluoro, unasyn)  #Abx allergy: sulfamethizole (Unknown)    RECOMMENDATIONS  - This note is not complete, all recommendations to follow.     Spectra [id number] ASSESSMENT  58 M Pmhx of PVD s/p R AKA, paraplegia with multiple chronic bedsores and suprapubic catheter, previous hospitalization for AMS that responded to flumazenil and narcan, presents for an episode of unresponsiveness witnessed by family members found to be hypoglycemic    IMPRESSION  #Possible Aspiration PNA, RLL opacity on CXR    hypothermic on admission, does not meet sepsis criteria  #Acute resp failure hypercapnia secondary to metabolic encephalopathy  #L foot necrotic ulcer, suspect OM  #Sacral decub buttocks, clean   #Hyponatremia, resolved  #Previous ucx 9/2019 kleb (R fluoro), kluyvera (R bactrim, fluoro, unasyn)  #Abx allergy: sulfamethizole (Unknown)    RECOMMENDATIONS  - Cefepime 1g q24h & IV flagyl 500mg q8h IV  - HOLD Vanc , check AM level tomorrow  - ESR/CRP  - Sent wcx L foot   - xray L foot  - Burn consult for debridement    Spectra [phone]

## 2019-10-12 NOTE — DIETITIAN INITIAL EVALUATION ADULT. - ADD RECOMMEND
RD to monitor diet order, body composition (wt trends), nutrition focused physical findings (TF tolerance if appropriate/skin).

## 2019-10-12 NOTE — DIETITIAN INITIAL EVALUATION ADULT. - PERTINENT LABORATORY DATA
Consult left breast cancer. Pt presented with spouse for consult. Using cane for ambulation assist.  C/o right knee pain due to arthritis. States neurologist discontinued her mobic. She has had 8 Tia's since March per pt. She has hearing loss to left ear. She has unsteady gait and dizziness. Biopsy 19. S/p lumpectomy 19. oncotype pending. F/u Dr. Tereso Plaza 19. Pt is  5/ para 5. Started menses in . Menses ended with menopause 04. History of oral contraceptive use. No history of HRT. Pt will be scheduled for See/Sim after Oncotype results/chemo. Emailed NN to request f/u with Dr. Tereso Plaza be moved up from 19.     John Miller RN 10/12: POCT blood glucose 123, K 5.4, BUN 71, Cr 5.0, ca uncorrected 6.5, GFR 12, H/H 8.6/26.9, magnesium 1.1

## 2019-10-12 NOTE — PROGRESS NOTE ADULT - SUBJECTIVE AND OBJECTIVE BOX
CC.  Unresponsiveness  HPI.  Patient is intubated.  Follows command.  Unable to obtain ROS/HX    Vital Signs Last 24 Hrs  T(C): 36.1 (12 Oct 2019 07:01), Max: 36.1 (12 Oct 2019 07:01)  T(F): 97 (12 Oct 2019 07:01), Max: 97 (12 Oct 2019 07:01)  HR: 75 (12 Oct 2019 05:10) (69 - 80)  BP: 132/82 (12 Oct 2019 05:10) (101/97 - 181/97)  BP(mean): 100 (12 Oct 2019 05:10) (86 - 100)  RR: 24 (12 Oct 2019 07:01) (20 - 32)  SpO2: 100% (12 Oct 2019 05:10) (99% - 100%)      PHYSICAL EXAM-  GENERAL: NAD, chronic ill appearing male  HEAD:  Atraumatic, Normocephalic  EYES: EOMI, PERRLA, conjunctiva and sclera clear  NECK: Supple, No JVD, Normal thyroid  NERVOUS SYSTEM:  follows some command  CHEST/LUNG: Clear to percussion bilaterally; No rales, rhonchi, wheezing, or rubs  HEART: Regular rate and rhythm; No murmurs, rubs, or gallops  ABDOMEN: Soft, Nontender, Nondistended; Bowel sounds present  EXTREMITIES:   No clubbing, cyanosis, or edema  SKIN: No rashes or lesions    MEDICATIONS  (STANDING):  dexmedetomidine Infusion 0.2 MICROgram(s)/kG/Hr (3.43 mL/Hr) IV Continuous <Continuous>  dextrose 5% 1000 milliLiter(s) (150 mL/Hr) IV Continuous <Continuous>  dextrose 5% + sodium chloride 0.45%. 1000 milliLiter(s) (1000 mL/Hr) IV Continuous <Continuous>  dextrose 5%. 1000 milliLiter(s) (500 mL/Hr) IV Continuous <Continuous>  gabapentin 800 milliGRAM(s) Oral three times a day  heparin  Injectable 5000 Unit(s) SubCutaneous every 12 hours  meropenem  IVPB 500 milliGRAM(s) IV Intermittent every 12 hours  sodium chloride 0.9% Bolus 500 milliLiter(s) IV Bolus once  vancomycin  IVPB 500 milliGRAM(s) IV Intermittent every 24 hours    MEDICATIONS  (PRN):    Imaging Personally Reviewed:     [x ] YES  [ ] NO    Consultant(s) Notes Reviewed:  [x ] YES  [ ] NO    Care Discussed with Consultants/Other Providers [x ] YES  [ ] No medical contraindication for discharge

## 2019-10-12 NOTE — DIETITIAN INITIAL EVALUATION ADULT. - PHYSICAL APPEARANCE
Intubated however alert/opens eyes; no signs of fat/muscle wasting observed, rt AKA. History of paraplegia noted with multiple pressure ulcers- stage IV sacrum, stage IV rt gluteal fold, unstageable to left gluteal fold, unstageable to left heel. BMI with rt AKA- 27.

## 2019-10-12 NOTE — DIETITIAN INITIAL EVALUATION ADULT. - ENTERAL
If patient to be initiated on enteral feeds, consider Vital HP at goal rate of 40 mL/hr fort total volume of 960 mL/day and prosource TF 45 mL q 12 hrs (2 packets/day) to provide 1040 kcal, 105 g protein, 806 mL free water. Additional flushes per CCU team. Additional 612 kcal from IVF.

## 2019-10-12 NOTE — PROGRESS NOTE ADULT - SUBJECTIVE AND OBJECTIVE BOX
Patient is a 58y old  Male who presents with a chief complaint of unresponsive at home (11 Oct 2019 22:57)      Over Night Events:  Patient seen and examined no pressors of sedation more awake this morning       ROS:  See HPI    PHYSICAL EXAM    ICU Vital Signs Last 24 Hrs  T(C): 35.3 (12 Oct 2019 05:01), Max: 35.8 (12 Oct 2019 01:45)  T(F): 95.6 (12 Oct 2019 05:01), Max: 96.5 (12 Oct 2019 01:45)  HR: 75 (12 Oct 2019 05:10) (69 - 80)  BP: 132/82 (12 Oct 2019 05:10) (101/97 - 181/97)  BP(mean): 100 (12 Oct 2019 05:10) (86 - 100)  ABP: --  ABP(mean): --  RR: 32 (12 Oct 2019 05:10) (20 - 32)  SpO2: 100% (12 Oct 2019 05:10) (99% - 100%)      General: awake follow simple command comfortable   HEENT:     et tube            Lymph Nodes: NO cervical LN   Lungs: Bilateral BS  Cardiovascular: Regular   Abdomen: Soft, Positive BS, suprapubic brown   Extremities: No clubbing   Skin: warm   Neurological: lower paraplegia baseline   Musculoskeletal: move all ext     I&O's Detail    11 Oct 2019 07:01  -  12 Oct 2019 07:00  --------------------------------------------------------  IN:    dextrose 5%: 450 mL    IV PiggyBack: 150 mL  Total IN: 600 mL    OUT:    Indwelling Catheter - Suprapubic: 1225 mL  Total OUT: 1225 mL    Total NET: -625 mL          LABS:                          8.6    19.29 )-----------( 332      ( 12 Oct 2019 04:16 )             26.9         12 Oct 2019 04:16    135    |  98     |  71     ----------------------------<  98     5.4     |  18     |  5.0      Ca    6.5        12 Oct 2019 04:16  Mg     1.1       12 Oct 2019 04:16    TPro  5.9    /  Alb  2.0    /  TBili  0.2    /  DBili  x      /  AST  17     /  ALT  17     /  AlkPhos  174    11 Oct 2019 18:50  Amylase x     lipase x                                                                                              CARDIAC MARKERS ( 11 Oct 2019 18:50 )  x     / 0.11 ng/mL / x     / x     / x                                                                                                         Mode: Auto Mode: PRVC/ Volume Support  RR (machine): 24  TV (machine): 450  FiO2: 40  PEEP: 5  MAP: 10  PIP: 20                                      ABG - ( 12 Oct 2019 03:12 )  pH, Arterial: 7.33  pH, Blood: x     /  pCO2: 35    /  pO2: 144   / HCO3: 18    / Base Excess: -6.9  /  SaO2: 99                  MEDICATIONS  (STANDING):  dexmedetomidine Infusion 0.2 MICROgram(s)/kG/Hr (3.43 mL/Hr) IV Continuous <Continuous>  dextrose 5% 1000 milliLiter(s) (150 mL/Hr) IV Continuous <Continuous>  dextrose 5% + sodium chloride 0.45%. 1000 milliLiter(s) (1000 mL/Hr) IV Continuous <Continuous>  dextrose 5%. 1000 milliLiter(s) (500 mL/Hr) IV Continuous <Continuous>  gabapentin 800 milliGRAM(s) Oral three times a day  heparin  Injectable 5000 Unit(s) SubCutaneous every 12 hours  meropenem  IVPB 500 milliGRAM(s) IV Intermittent every 12 hours  sodium chloride 0.9% Bolus 500 milliLiter(s) IV Bolus once  vancomycin  IVPB 500 milliGRAM(s) IV Intermittent every 24 hours    MEDICATIONS  (PRN):          Xrays:  TLC:  OG:  ET tube:                                                                                    Rll opacity atelectasis      ECHO:  [facility] ICU:

## 2019-10-12 NOTE — DIETITIAN INITIAL EVALUATION ADULT. - ENERGY NEEDS
Calories: [id number]- 1562 kcal   Protein: 1.5-2.0 g/kg due to intubation/multiple pressure ulcers= 103-137 g/day (monitor renal function)  Fluid: per CCU team

## 2019-10-12 NOTE — PROGRESS NOTE ADULT - ASSESSMENT
Patient is 57 yo male with hx of Anemia, Diabetes, Hypertension, Lumbar compression fracture, PAD (peripheral artery disease), Pressure ulcer, and Suprapubic catheter presenting with     1.  Acute resp failure hypercapnia secondary to metabolic encephalopathy   -Altered mental status due to mixed respiratory and metabolic acidosis secondary to suspected intoxication with benzodiazepines and/ or narcotics. -Suspect mainly benzo overdose given hypothermia, respiratory depression, central nervous system depression, level of arousal and reactivity of pupils.   -Mentation improving  -Further care as per ICU    2. Underlying aspiration pneumonia/ suspected chronic osteomyelitis  - meropenem and vancomycin  - f/u pan culture  - ID consultation pending  -Wound care    2. SILVINA on Chronic renal insufficiency/hyperkalemia   - likely prerenal  - will continue to monitor bmp  -Continue with IVF with bicarb drip  - renal consult pending  -renal and bladder US     3. chronic anemia  - likely secondary to CKD  - monitor CBC    4. Hyperkalemia  - likely secondary SILVINA and acidosis  - will repeat bmp  - Improving.     5. Elevated cardiac biomarker  - likely secondary to renal insufficency and metabolic condition,  -Monitor trop level       DVT and GI prophyllaxis  - protonix and heparin  Overall prognosis guarded to poor  further care as per critical care

## 2019-10-12 NOTE — PROGRESS NOTE ADULT - ASSESSMENT
IMPRESSION:  ARF secondary to hypercapnia secondary   drug overdose ??   metabolic encephalopathy  metabolic acidosis   PNA aspiration   Osteomyelitis  SILVINA on CKD        PLAN:    CNS: precedex if needed   follow drug screen    HEENT: oral care     PULMONARY: keep same vent setting   send DTA   suction   ?? rrl atelctasis opacity   possible weaning trial when more awake now he ope eyes but very weak     CARDIOVASCULAR: keep IS= os continue bicarb drip     GI: GI prophylaxis.  Feeding NG    RENAL: renal consult follow BMP Q shift follow bicarb on bicarb drip     INFECTIOUS DISEASE: continue abx ID consult     HEMATOLOGICAL:  DVT prophylaxis. heparin     ENDOCRINE:  Follow up FS.  Insulin protocol if needed.    MUSCULOSKELETAL:  sign out given to day team       CRITICAL CARE TIME SPENT: ***

## 2019-10-12 NOTE — CONSULT NOTE ADULT - ASSESSMENT
- SILVINA  Hemodynamic changes  Volume depletion / decrease intake  - CKD possibly stage 4      Possible etiologies: DM, systemic vascular disease, and also H/O obstructive uropathy (has been refusing to see a nephrologist as per family)  - Hyperkalemia, corrected  - Metabolic acidosis, improved with treatment  Severe anemia     Could be 2nd to CKD, R/O iron deficiency and other causes  - Acute resp failure  -Altered mental status    Metabolic encephalopathy, Hypercapnia, also suspected intoxication with CNS affecting medications:       - High dose Gabapentin        - Possible benzodiazepines and narcotics  Severe PAD     s/p Rt BKA     Sacral and multiple leg/Lt foot ulcers  Sepsis  Renal plan:  Monitor and stabilize vitals  Avoid hypotension  - Continue IV fluids, bicarb normalizing at 23, could change IV fluids to NS or LR if bicarb higher  - Adjust all medications for GFR please    Gabapentin is very high dose in presence of severe renal insufficiency, consider decreasing the dose according to GFR (200 mg TID or as tolerated) and consider pain management evaluation please as pt c/o severe mostly chronic pain  Monitor BUN/Cr  Correct lytes as needed  - Obtain albumin to correct Ca, supplement Ca as needed    Obtain PTH and Vit D  - CPK / Uric acid    U/Prot/Cr  - Renal/bladder sonogram  - Monitor urine out put  - Iron studies    Stool for OB    Transfuse PRBC as needed  - If PRBC not given, when iron studies available, could consider LINDY, Epogen 85232 units QW  Antibiotics as per ID     Meropenem and vancomycin  Adjust doses for GFR please and obtain Vanco level  Wound care   Vent management as per Pulm/CC  Continue general care and management of rest of comorbidities as per medicine/CC  Management D/W CCU team and explained to the family  - Pt has been refusing to see a nephrologist and does not want RRT/HD Rx, had a detailed talk with pt's family in his presence about overall management of advanced CKD and RRT in future  Prognosis very guarded  Thank you  Will f/u

## 2019-10-12 NOTE — CONSULT NOTE ADULT - SUBJECTIVE AND OBJECTIVE BOX
NEPHROLOGY CONSULTATION NOTE    Patient is 59 yo male with hx of CKD possibly stage 4, Anemia, Diabetes, Hypertension, Lumbar compression fracture, PAD (peripheral artery disease), Pressure ulcer, and Suprapubic catheter presenting with altered mental status with possible metabolic encephalopathy and also possible medication induced, s/p intubation on vent, with worsening renal function renal was consulted    PAST MEDICAL & SURGICAL HISTORY:  Anemia  PAD (peripheral artery disease)  Hypertension  Suprapubic catheter  Pressure ulcer  Diabetes  Lumbar compression fracture  S/P below knee amputation, right  S/P debridement  Toe amputation status, right  H/O hernia repair    Allergies:  sulfamethizole (Unknown)    Home Medications Reviewed  Hospital Medications:   MEDICATIONS  (STANDING):  dexmedetomidine Infusion 0.2 MICROgram(s)/kG/Hr (3.43 mL/Hr) IV Continuous <Continuous>  dextrose 5% 1000 milliLiter(s) (150 mL/Hr) IV Continuous <Continuous>  dextrose 5% + sodium chloride 0.45%. 1000 milliLiter(s) (1000 mL/Hr) IV Continuous <Continuous>  dextrose 5%. 1000 milliLiter(s) (500 mL/Hr) IV Continuous <Continuous>  fentaNYL   Infusion. 0.5 MICROgram(s)/kG/Hr (3.43 mL/Hr) IV Continuous <Continuous>  gabapentin 800 milliGRAM(s) Oral three times a day  heparin  Injectable 5000 Unit(s) SubCutaneous every 12 hours  meropenem  IVPB 500 milliGRAM(s) IV Intermittent every 12 hours  norepinephrine Infusion 0.05 MICROgram(s)/kG/Min (3.216 mL/Hr) IV Continuous <Continuous>  sodium chloride 0.9% Bolus 500 milliLiter(s) IV Bolus once  vancomycin  IVPB 500 milliGRAM(s) IV Intermittent every 24 hours      SOCIAL HISTORY:  Denies ETOH, Smoking  FAMILY HISTORY:  No pertinent family history in first degree relatives        REVIEW OF SYSTEMS: Not fully obtainable on ventilator, he is writing on paper that he is in pain all over    VITALS:  T(F): 98.7 (10-12-19 @ 15:55), Max: 99.5 (10-12-19 @ 14:00)  HR: 83 (10-12-19 @ 16:30)  BP: 110/59 (10-12-19 @ 16:30)  RR: 19 (10-12-19 @ 17:00)  SpO2: 99% (10-12-19 @ 17:00)    10-11 @ 07:01  -  10-12 @ 07:00  --------------------------------------------------------  IN: 600 mL / OUT: 1225 mL / NET: -625 mL    10-12 @ 07:01  -  10-12 @ 17:54  --------------------------------------------------------  IN: 1223 mL / OUT: 605 mL / NET: 618 mL      Height (cm): 167.6 (10-12 @ 01:45)  Weight (kg): 68.6 (10-12 @ 01:45)  BMI (kg/m2): 24.4 (10-12 @ 01:45)  BSA (m2): 1.78 (10-12 @ 01:45)      I&O's Detail    11 Oct 2019 07:01  -  12 Oct 2019 07:00  --------------------------------------------------------  IN:    dextrose 5%: 450 mL    IV PiggyBack: 150 mL  Total IN: 600 mL    OUT:    Indwelling Catheter - Suprapubic: 1225 mL  Total OUT: 1225 mL    Total NET: -625 mL      12 Oct 2019 07:01  -  12 Oct 2019 17:54  --------------------------------------------------------  IN:    dexmedetomidine Infusion: 18 mL    dextrose 5%: 1200 mL    fentaNYL Infusion.: 5 mL  Total IN: 1223 mL    OUT:    Indwelling Catheter - Suprapubic: 605 mL  Total OUT: 605 mL    Total NET: 618 mL            PHYSICAL EXAM:  Constitutional: NAD  HEENT: anicteric sclera, oropharynx clear  Neck: No JVD  Respiratory: On ventilator, CTAB, no wheezes, rales or rhonchi  Cardiovascular: S1, S2, RRR  Gastrointestinal: BS+, soft, NT/ND  Extremities: s/p Rt BKA, ulcers on Lt leg  Neurological: Alert and Ox 3 on vent  Psychiatric: Anxious  : No CVA tenderness. Posadas with SP cath    LABS:  10-12    135  |  95<L>  |  66<HH>  ----------------------------<  84  4.6   |  23  |  4.8<HH>    Ca    5.9<LL>      12 Oct 2019 15:21  Mg     1.2     10-12    TPro  4.8<L>  /  Alb  1.6<L>  /  TBili  <0.2  /  DBili  <0.2  /  AST  13  /  ALT  13  /  AlkPhos  146<H>  10-12    Creatinine Trend: 4.8 <--, 4.8 <--, 5.0 <--, 5.7 <--, 4.5 <--                        7.1    11.39 )-----------( 260      ( 12 Oct 2019 15:21 )             21.6

## 2019-10-13 NOTE — CONSULT NOTE ADULT - SUBJECTIVE AND OBJECTIVE BOX
Podiatry Consult Note    Subjective:   MARGE BELLA is a pleasant well-nourished, well developed 58y Male in no acute distress, alert awake, and oriented to person, place and time.   Patient is a 58y old  Male who presents with a chief complaint of unresponsive at home (13 Oct 2019 08:47)  Patient was seen bedside today during PM Rounds. Patient says he does not remember how long he has had the wound but says it has been a while. Patient says he has been applying Santyl for a few months now. Currently patient denies F/N/V and shortness of breath.     HPI:  58yM Pmhx of PVD s/p R AKA, paraplegia with multiple chronic bedsores and suprapubic catheter presents for an episode of unresponsiveness witnessed by family members. En route to the ER found to be hypoglycemic, given IV dextrose. In the ER found to be acidotic and continuously unresponsive , even with administration of narcan. Of note, he was recently admitted last month for a similar episode of unresponsiveness which responded to flumazenil and narcan. He revealed at that time that he had taken three xanax of 2 mg. He sees Dr. Gooden for his chronic decubiti. central line was placed by ed team and he was intubated for airway protection and hypercapnia (11 Oct 2019 22:57)      Past Medical History and Surgical History  PAST MEDICAL & SURGICAL HISTORY:  Anemia  PAD (peripheral artery disease)  Hypertension  Suprapubic catheter  Pressure ulcer  Diabetes  Lumbar compression fracture  S/P below knee amputation, right  S/P debridement  Toe amputation status, right  H/O hernia repair       Review of Systems:   Constitutional: No weakness, fevers or chills  Eyes / ENT: No visual changes; No vertigo or throat pain   Neck: No pain or stiffness  Respiratory: No cough, wheezing, hemoptysis; No shortness of breath  Cardiovascular: No chest pain or palpitations  Gastrointestinal: No abdominal or epigastric pain. No nausea, vomiting, or hematemesis; No diarrhea or constipation. No melena or hematochezia.  Genitourinary: No dysuria, frequency or hematuria  Neurological: No numbness or weakness  Skin: s/p Calcanectomy Left Foot - Surgical Wound Present     Objective:  Vital Signs Last 24 Hrs  T(C): 37.2 (13 Oct 2019 11:00), Max: 37.8 (13 Oct 2019 06:03)  T(F): 98.9 (13 Oct 2019 11:00), Max: 100 (13 Oct 2019 06:03)  HR: 89 (13 Oct 2019 12:01) (71 - 132)  BP: 108/65 (13 Oct 2019 12:01) (61/41 - 146/71)  BP(mean): 80 (13 Oct 2019 12:01) (46 - 102)  RR: 22 (13 Oct 2019 12:01) (15 - 45)  SpO2: 99% (13 Oct 2019 12:01) (98% - 100%)                        9.8    11.33 )-----------( 299      ( 13 Oct 2019 06:31 )             29.7                 10-13    137  |  95<L>  |  59<H>  ----------------------------<  116<H>  4.3   |  27  |  4.6<HH>    Ca    6.1<L>      13 Oct 2019 06:31  Mg     1.4     10-13    TPro  4.8<L>  /  Alb  1.6<L>  /  TBili  <0.2  /  DBili  <0.2  /  AST  13  /  ALT  13  /  AlkPhos  146<H>  10-12    Physical Exam - Lower Extremity Focused:   Derm:   s/p Calcanectomy Left Foot   Open Surgical Wound from the Plantar Heel Extending to the Posterior aspect   Fibrogranular Tissue noted around the heel w/ Mild Drainage w/ Mild Odor  Red-fibrogranular Base w/ Eschar present on the lateral and plantar aspects    Vascular:   DP and PT Pulses were diminished   Foot was warm to cold to the touch    Neuro:  Moderately Neuropathic      Assessment:   s/p AKA Right   s/p Calcanectomy Left Foot   Fibrogranular w/ Eschar on wound bed     Plan:  Chart reviewed and Patient Evaluated. All questions and concerns were addressed and answered   Discussed diagnosis and treatment with patient  Wound Flushed w/ Normal Saline; Applied Xeroform / DSD / Kerlix   Local Wound Care: Flush w/ Normal Saline; Apply Santyl / DSD / Kerlix   Recommend A-Duplex on the Left LE; If Abnormal Please Consult Vascular   Attending Updated and Aware

## 2019-10-13 NOTE — PROGRESS NOTE ADULT - SUBJECTIVE AND OBJECTIVE BOX
CC.  Unresponsiveness  HPI.  Patient is intubated.  Follows command.  Unable to obtain ROS/HX    Vital Signs Last 24 Hrs  T(C): 37.3 (13 Oct 2019 07:01), Max: 37.8 (13 Oct 2019 06:03)  T(F): 99.1 (13 Oct 2019 07:01), Max: 100 (13 Oct 2019 06:03)  HR: 79 (13 Oct 2019 08:37) (71 - 132)  BP: 104/62 (13 Oct 2019 08:00) (61/41 - 146/71)  BP(mean): 78 (13 Oct 2019 08:00) (46 - 102)  RR: 26 (13 Oct 2019 08:00) (19 - 45)  SpO2: 100% (13 Oct 2019 08:37) (89% - 100%)      PHYSICAL EXAM-  GENERAL: NAD, chronic ill appearing male  HEAD:  Atraumatic, Normocephalic  EYES: EOMI, PERRLA, conjunctiva and sclera clear  NECK: Supple, No JVD, Normal thyroid  NERVOUS SYSTEM:  follows some command  CHEST/LUNG: Clear to percussion bilaterally; No rales, rhonchi, wheezing, or rubs  HEART: Regular rate and rhythm; No murmurs, rubs, or gallops  ABDOMEN: Soft, Nontender, Nondistended; Bowel sounds present  EXTREMITIES:   No clubbing, cyanosis, or edema  SKIN: No rashes or lesions                              9.8    11.33 )-----------( 299      ( 13 Oct 2019 06:31 )             29.7     10-13    137  |  95<L>  |  59<H>  ----------------------------<  116<H>  4.3   |  27  |  4.6<HH>    Ca    6.1<L>      13 Oct 2019 06:31  Mg     1.4     10-13    TPro  4.8<L>  /  Alb  1.6<L>  /  TBili  <0.2  /  DBili  <0.2  /  AST  13  /  ALT  13  /  AlkPhos  146<H>  10-12    CARDIAC MARKERS ( 11 Oct 2019 18:50 )  x     / 0.11 ng/mL / x     / x     / x              MEDICATIONS  (STANDING):  cefepime   IVPB 1000 milliGRAM(s) IV Intermittent every 24 hours  dexmedetomidine Infusion 0.2 MICROgram(s)/kG/Hr (3.43 mL/Hr) IV Continuous <Continuous>  fentaNYL   Infusion. 0.5 MICROgram(s)/kG/Hr (3.43 mL/Hr) IV Continuous <Continuous>  gabapentin 800 milliGRAM(s) Oral three times a day  heparin  Injectable 5000 Unit(s) SubCutaneous every 12 hours  metroNIDAZOLE  IVPB 500 milliGRAM(s) IV Intermittent every 8 hours  norepinephrine Infusion 0.05 MICROgram(s)/kG/Min (3.216 mL/Hr) IV Continuous <Continuous>    MEDICATIONS  (PRN):

## 2019-10-13 NOTE — PROGRESS NOTE ADULT - SUBJECTIVE AND OBJECTIVE BOX
MARGE BELLA  58y, Male  Allergy: sulfamethizole (Unknown)      CHIEF COMPLAINT: unresponsive at home (13 Oct 2019 07:22)      INTERVAL EVENTS/HPI  - No acute events overnight  - T(F): , Max: 100 (10-13-19 @ 06:03)  - Denies any worsening symptoms  - Tolerating medication  - WBC Count: 11.33 (10-13-19 @ 06:31)  - Creatinine, Serum: 4.6 (10-13-19 @ 06:31) <--Creatinine, Serum: 4.3 (10-12-19 @ 23:35)      ROS  General: Denies rigors, nightsweats  HEENT: Denies headache, rhinorrhea, sore throat, eye pain  CV: Denies CP, palpitations  PULM: Denies SOB, wheezing  GI: Denies hematemesis, hematochezia, melena  : Denies discharge, hematuria  MSK: Denies arthralgias, myalgias  SKIN: Denies rash, lesions  NEURO: Denies paresthesias, weakness  PSYCH: Denies depression, anxiety    VITALS:  T(F): 99.1, Max: 100 (10-13-19 @ 06:03)  HR: 81  BP: 101/59  RR: 24Vital Signs Last 24 Hrs  T(C): 37.3 (13 Oct 2019 07:01), Max: 37.8 (13 Oct 2019 06:03)  T(F): 99.1 (13 Oct 2019 07:01), Max: 100 (13 Oct 2019 06:03)  HR: 81 (13 Oct 2019 07:59) (71 - 132)  BP: 101/59 (13 Oct 2019 07:00) (61/41 - 146/71)  BP(mean): 75 (13 Oct 2019 07:00) (46 - 102)  RR: 24 (13 Oct 2019 07:01) (19 - 45)  SpO2: 100% (13 Oct 2019 07:59) (89% - 100%)    PHYSICAL EXAM:  Gen: intubated  HEENT: Normocephalic, atraumatic  Neck: supple, no lymphadenopathy  CV: Regular rate & regular rhythm  Lungs: decreased BS at bases, no fremitus  Abdomen: Soft, BS present, SPC  Ext: Warm, well perfused, L heel necrotic purulent ulcer, R AKA  sacral decub clean  Neuro: non focal, awake  Skin: no rash, no erythema  Lines: no phlebitis    FH: Non-contributory  Social Hx: Non-contributory    TESTS & MEASUREMENTS:                        9.8    11.33 )-----------( 299      ( 13 Oct 2019 06:31 )             29.7     10-13    137  |  95<L>  |  59<H>  ----------------------------<  116<H>  4.3   |  27  |  4.6<HH>    Ca    6.1<L>      13 Oct 2019 06:31  Mg     1.4     10-13    TPro  4.8<L>  /  Alb  1.6<L>  /  TBili  <0.2  /  DBili  <0.2  /  AST  13  /  ALT  13  /  AlkPhos  146<H>  10-12    eGFR if Non African American: 13 mL/min/1.73M2 (10-13-19 @ 06:31)  eGFR if African American: 15 mL/min/1.73M2 (10-13-19 @ 06:31)  eGFR if Non African American: 14 mL/min/1.73M2 (10-12-19 @ 23:35)  eGFR if : 17 mL/min/1.73M2 (10-12-19 @ 23:35)  eGFR if Non African American: 13 mL/min/1.73M2 (10-12-19 @ 15:21)  eGFR if : 15 mL/min/1.73M2 (10-12-19 @ 15:21)    LIVER FUNCTIONS - ( 12 Oct 2019 15:21 )  Alb: 1.6 g/dL / Pro: 4.8 g/dL / ALK PHOS: 146 U/L / ALT: 13 U/L / AST: 13 U/L / GGT: x                 Blood Gas Venous - Lactate: 0.8 mmoL/L (10-11-19 @ 21:30)  Blood Gas Venous - Lactate: 0.6 mmoL/L (10-11-19 @ 18:45)      INFECTIOUS DISEASES TESTING      RADIOLOGY & ADDITIONAL TESTS:  I have personally reviewed the last Chest xray  CXR      CT      CARDIOLOGY TESTING  12 Lead ECG:   Ventricular Rate 71 BPM    Atrial Rate 71 BPM    P-R Interval 192 ms    QRS Duration 84 ms    Q-T Interval 484 ms    QTC Calculation(Bezet) 525 ms    P Axis 24 degrees    R Axis -19 degrees    T Axis 49 degrees    Diagnosis Line Sinus rhythm with Premature atrial complexes with Aberrant conduction  Low voltage QRS  Prolonged QT  Nonspecific ST and T wave abnormality  Abnormal ECG    Confirmed by AMMON TORREZ MD ([id number]) on 10/12/2019 1:23:14 PM (10-12-19 @ 07:32)  12 Lead ECG:   Ventricular Rate 72 BPM    Atrial Rate 72 BPM    P-R Interval 168 ms    QRS Duration 106 ms    Q-T Interval 440 ms    QTC Calculation(Bezet) 481 ms    P Axis 13 degrees    R Axis -34 degrees    T Axis 58 degrees    Diagnosis Line Normal sinus rhythm  Left axis deviation  Prolonged QT  Poor R wave progression  Abnormal ECG    Confirmed by AMMON TORREZ MD [id number]) on 10/12/2019 1:29:00 PM (10-11-19 @ 19:03)      MEDICATIONS  cefepime   IVPB 1000  dexmedetomidine Infusion 0.2  fentaNYL   Infusion. 0.5  gabapentin 800  heparin  Injectable 5000  metroNIDAZOLE  IVPB 500  norepinephrine Infusion 0.05      ANTIBIOTICS:  cefepime   IVPB 1000 milliGRAM(s) IV Intermittent every 24 hours  metroNIDAZOLE  IVPB 500 milliGRAM(s) IV Intermittent every 8 hours      All available historical records have been reviewed

## 2019-10-13 NOTE — PROGRESS NOTE ADULT - ASSESSMENT
IMPRESSION:  ARF secondary to hypercapnia secondary   drug overdose ??   metabolic encephalopathy  metabolic acidosis   PNA aspiration   Osteomyelitis  SILVINA on CKD        PLAN:    CNS:hold sedation for weaning trial   follow drug screen    HEENT: oral care     PULMONARY: do abg now if good will proceed with weaning trial sign out was given to team  on call   suction         CARDIOVASCULAR: keep IS= os d/c bicarb drip if morning BMP good and HCo3 more then 24     GI: GI prophylaxis. NPO for extubation     RENAL: renal consult follow BMP Q shift follow bicarb on bicarb drip     INFECTIOUS DISEASE: continue abx  as per ID follow vanco trouph     HEMATOLOGICAL:  DVT prophylaxis. heparin     ENDOCRINE:  Follow up FS.  Insulin protocol if needed.    MUSCULOSKELETAL:  do xray left foot   sign out given to day team       CRITICAL CARE TIME SPENT: ***

## 2019-10-13 NOTE — PROGRESS NOTE ADULT - ASSESSMENT
ASSESSMENT  58 M Pmhx of PVD s/p R AKA, paraplegia with multiple chronic bedsores and suprapubic catheter, previous hospitalization for AMS that responded to flumazenil and narcan, presents for an episode of unresponsiveness witnessed by family members found to be hypoglycemic    IMPRESSION  #Possible Aspiration PNA, RLL opacity on CXR    hypothermic on admission, does not meet sepsis criteria  #Acute resp failure hypercapnia secondary to metabolic encephalopathy  #L foot necrotic ulcer, suspect OM      #Sacral decub buttocks, clean   #Hyponatremia, resolved  #Previous ucx 9/2019 kleb (R fluoro), kluyvera (R bactrim, fluoro, unasyn)  #Abx allergy: sulfamethizole (Unknown)    RECOMMENDATIONS  - Cefepime 1g q24h & IV flagyl 500mg q8h IV  - f/u AM Vanc level. If >15 HOLD, If <15 dose 1g and recheck level tomorrow AM  - ESR/CRP   - F/u wcx L foot   - xray L foot  - Needs debridement of L heel, follows with Burn    Spectra [id number]

## 2019-10-13 NOTE — PROGRESS NOTE ADULT - ASSESSMENT
Patient is 57 yo male with hx of Anemia, Diabetes, Hypertension, Lumbar compression fracture, PAD (peripheral artery disease), Pressure ulcer, and Suprapubic catheter presenting with     1.  Acute resp failure hypercapnia secondary to metabolic encephalopathy   -Altered mental status due to mixed respiratory and metabolic acidosis secondary to suspected intoxication with benzodiazepines and/ or narcotics. -Suspect mainly benzo overdose given hypothermia, respiratory depression, central nervous system depression, level of arousal and reactivity of pupils.   -Mentation improving.  Trial of wean today  -Further care as per ICU    2. Underlying aspiration pneumonia/ suspected chronic osteomyelitis  - Continue with current antibiotic  - f/u pan culture  -Further care as per ID   -Wound care    2. SILVINA on Chronic renal insufficiency/hyperkalemia   - likely prerenal  - will continue to monitor bmp  -Continue with IVF with bicarb drip  - Further care as per nephrology   -renal and bladder US     3. chronic anemia  - likely secondary to CKD.  S/P 2 unit of RBC transfusion  - monitor CBC  -Further care as per critical care    4. Hyperkalemia  - likely secondary SILVINA and acidosis  -resolved    5. Elevated cardiac biomarker  - likely secondary to renal insufficency and metabolic condition,  -Monitor trop level       DVT and GI prophyllaxis  - protonix and heparin  Overall prognosis guarded to poor  further care as per critical care

## 2019-10-13 NOTE — PROGRESS NOTE ADULT - SUBJECTIVE AND OBJECTIVE BOX
Patient is a 58y old  Male who presents with a chief complaint of unresponsive at home (12 Oct 2019 17:53)      Over Night Events:  Patient seen and examined vented on small dose pressors awake       ROS:  See HPI    PHYSICAL EXAM    ICU Vital Signs Last 24 Hrs  T(C): 37.3 (13 Oct 2019 07:01), Max: 37.8 (13 Oct 2019 06:03)  T(F): 99.1 (13 Oct 2019 07:01), Max: 100 (13 Oct 2019 06:03)  HR: 85 (13 Oct 2019 07:00) (71 - 132)  BP: 101/59 (13 Oct 2019 07:00) (61/41 - 146/71)  BP(mean): 75 (13 Oct 2019 07:00) (46 - 102)  ABP: --  ABP(mean): --  RR: 24 (13 Oct 2019 07:01) (19 - 45)  SpO2: 99% (13 Oct 2019 07:00) (89% - 100%)      General: awake follow command   HEENT:                Lymph Nodes: NO cervical LN   Lungs: Bilateral BS  Cardiovascular: Regular   Abdomen: Soft, Positive BS  Extremities: No clubbing   Skin: warm   Neurological: at his baseline   Musculoskeletal: move all ext     I&O's Detail    12 Oct 2019 07:01  -  13 Oct 2019 07:00  --------------------------------------------------------  IN:    dexmedetomidine Infusion: 52 mL    dextrose 5%: 3750 mL    fentaNYL Infusion.: 5 mL    IV PiggyBack: 450 mL    norepinephrine Infusion: 98 mL    Packed Red Blood Cells: 581 mL  Total IN: 4936 mL    OUT:    Indwelling Catheter - Suprapubic: 2025 mL  Total OUT: 2025 mL    Total NET: 2911 mL      13 Oct 2019 07:01  -  13 Oct 2019 07:22  --------------------------------------------------------  IN:  Total IN: 0 mL    OUT:    Indwelling Catheter - Suprapubic: 40 mL  Total OUT: 40 mL    Total NET: -40 mL          LABS:                          9.2    11.36 )-----------( 257      ( 12 Oct 2019 23:35 )             27.4         12 Oct 2019 23:35    137    |  96     |  62     ----------------------------<  136    4.3     |  25     |  4.3      Ca    6.0        12 Oct 2019 23:35  Mg     1.5       12 Oct 2019 23:35    TPro  4.8    /  Alb  1.6    /  TBili  <0.2   /  DBili  <0.2   /  AST  13     /  ALT  13     /  AlkPhos  146    12 Oct 2019 15:21  Amylase x     lipase x                                                                                              CARDIAC MARKERS ( 11 Oct 2019 18:50 )  x     / 0.11 ng/mL / x     / x     / x                                                                                                         Mode: Auto Mode: PRVC/ Volume Support  RR (machine): 24  TV (machine): 450  FiO2: 40  PEEP: 5  MAP: 11  PIP: 21                                      ABG - ( 12 Oct 2019 03:12 )  pH, Arterial: 7.33  pH, Blood: x     /  pCO2: 35    /  pO2: 144   / HCO3: 18    / Base Excess: -6.9  /  SaO2: 99                  MEDICATIONS  (STANDING):  cefepime   IVPB 1000 milliGRAM(s) IV Intermittent every 24 hours  dexmedetomidine Infusion 0.2 MICROgram(s)/kG/Hr (3.43 mL/Hr) IV Continuous <Continuous>  dextrose 5% 1000 milliLiter(s) (150 mL/Hr) IV Continuous <Continuous>  dextrose 5% 1000 milliLiter(s) (150 mL/Hr) IV Continuous <Continuous>  dextrose 5% + sodium chloride 0.45%. 1000 milliLiter(s) (1000 mL/Hr) IV Continuous <Continuous>  dextrose 5%. 1000 milliLiter(s) (500 mL/Hr) IV Continuous <Continuous>  fentaNYL   Infusion. 0.5 MICROgram(s)/kG/Hr (3.43 mL/Hr) IV Continuous <Continuous>  gabapentin 800 milliGRAM(s) Oral three times a day  heparin  Injectable 5000 Unit(s) SubCutaneous every 12 hours  metroNIDAZOLE  IVPB 500 milliGRAM(s) IV Intermittent every 8 hours  norepinephrine Infusion 0.05 MICROgram(s)/kG/Min (3.216 mL/Hr) IV Continuous <Continuous>  sodium chloride 0.9% Bolus 500 milliLiter(s) IV Bolus once    MEDICATIONS  (PRN):          Xrays:  TLC:  OG:  ET tube:                                                                                    improve the right lower opacity    ECHO:  CAM ICU:

## 2019-10-14 NOTE — PROGRESS NOTE ADULT - SUBJECTIVE AND OBJECTIVE BOX
Patient is a 58y old  Male who presents with a chief complaint of unresponsive at home (14 Oct 2019 15:19)      REVIEW OF SYSTEMS:      ICU Vital Signs Last 24 Hrs  T(C): 36.7 (14 Oct 2019 23:32), Max: 36.9 (14 Oct 2019 19:00)  T(F): 98 (14 Oct 2019 23:32), Max: 98.5 (14 Oct 2019 19:00)  HR: 80 (14 Oct 2019 23:32) (78 - 91)  BP: 144/71 (14 Oct 2019 23:32) (110/81 - 160/74)  BP(mean): 100 (14 Oct 2019 23:32) (82 - 108)  ABP: --  ABP(mean): --  RR: 21 (14 Oct 2019 23:32) (10 - 40)  SpO2: 95% (14 Oct 2019 23:32) (93% - 99%)        PHYSICAL EXAM:  GENERAL: NAD, well-groomed, well-developed  HEAD:  Atraumatic, Normocephalic  EYES: EOMI, PERRLA, conjunctiva and sclera clear  ENMT: No tonsillar erythema, exudates, or enlargement; Moist mucous membranes, Good dentition, No lesions  NECK: Supple, No JVD, Normal thyroid  NERVOUS SYSTEM:  Alert & Oriented X3, Good concentration; Motor Strength 5/5 B/L upper and lower extremities; DTRs 2+ intact and symmetric  CHEST/LUNG: Clear to percussion bilaterally; No rales, rhonchi, wheezing, or rubs  HEART: Regular rate and rhythm; No murmurs, rubs, or gallops  ABDOMEN: Soft, Nontender, Nondistended; Bowel sounds present  EXTREMITIES:  2+ Peripheral Pulses, No clubbing, cyanosis, or edema  LYMPH: No lymphadenopathy noted  SKIN: No rashes or lesions      MEDICATIONS  (STANDING):  calcium carbonate    500 mG (Tums) Chewable 1 Tablet(s) Chew every 8 hours  cefepime   IVPB 1000 milliGRAM(s) IV Intermittent every 24 hours  cholecalciferol 1000 Unit(s) Oral daily  collagenase Ointment 1 Application(s) Topical daily  heparin  Injectable 5000 Unit(s) SubCutaneous every 12 hours  lactated ringers. 1000 milliLiter(s) (75 mL/Hr) IV Continuous <Continuous>  methadone    Tablet 80 milliGRAM(s) Oral four times a day  metroNIDAZOLE  IVPB 500 milliGRAM(s) IV Intermittent every 8 hours    MEDICATIONS  (PRN):  acetaminophen   Tablet .. 650 milliGRAM(s) Oral every 6 hours PRN Mild Pain (1 - 3)        Allergies    sulfamethizole (Unknown)    Intolerances            LABS:                        9.3    9.06  )-----------( 243      ( 14 Oct 2019 06:14 )             29.1     10-14    136  |  94<L>  |  54<H>  ----------------------------<  122<H>  4.0   |  26  |  4.3<HH>    Ca    6.7<L>      14 Oct 2019 06:14  Phos  7.8     10-14  Mg     1.7     10-14          CAPILLARY BLOOD GLUCOSE  POCT Blood Glucose.: 122 mg/dL (14 Oct 2019 16:59)  POCT Blood Glucose.: 107 mg/dL (14 Oct 2019 06:03)  POCT Blood Glucose.: 88 mg/dL (14 Oct 2019 01:54)      RADIOLOGY & ADDITIONAL TESTS:    < from: Xray Chest 1 View- PORTABLE-Routine (10.14.19 @ 05:35) >    Stable right basilar opacity.    < end of copied text >        Culture - Other (10.12.19 @ 18:55)    -  Trimethoprim/Sulfamethoxazole: R >2/38    -  Cefoxitin: S <=8    -  Ciprofloxacin: R >2    -  Ertapenem: S <=0.5    -  Gentamicin: R >8    -  Levofloxacin: R >4    -  Imipenem: S <=1    -  Meropenem: S <=1    -  Piperacillin/Tazobactam: I 32    -  Tobramycin: I 8    -  Amikacin: S <=16    -  Ampicillin: R >16 These ampicillin results predict results for amoxicillin    -  Ampicillin/Sulbactam: R >16/8 Enterobacter, Citrobacter, and Serratia may develop resistance during prolonged therapy (3-4 days)    -  Aztreonam: S <=4    -  Amoxicillin/Clavulanic Acid: I 16/8    -  Cefazolin: S 16 Enterobacter, Citrobacter, and Serratia may develop resistance during prolonged therapy (3-4 days)    -  Cefepime: S <=2    -  Ceftriaxone: S <=1 Enterobacter, Citrobacter, and Serratia may develop resistance during prolonged therapy    Specimen Source: .Other L heel wound    Culture Results:   Few Escherichia coli  Few Enterococcus species "Susceptibilities not performed"  Rare Staphylococcus aureus    Organism Identification: Escherichia coli    Organism: Escherichia coli    Method Type: ALLISON      Culture - Blood (10.12.19 @ 05:41)    Specimen Source: .Blood None    Culture Results:   No growth to date. Patient is seen and examined at the bed side, is afebrile.      REVIEW OF SYSTEMS: All other review systems are negative        ICU Vital Signs Last 24 Hrs  T(C): 36.7 (14 Oct 2019 23:32), Max: 36.9 (14 Oct 2019 19:00)  T(F): 98 (14 Oct 2019 23:32), Max: 98.5 (14 Oct 2019 19:00)  HR: 80 (14 Oct 2019 23:32) (78 - 91)  BP: 144/71 (14 Oct 2019 23:32) (110/81 - 160/74)  BP(mean): 100 (14 Oct 2019 23:32) (82 - 108)  ABP: --  ABP(mean): --  RR: 21 (14 Oct 2019 23:32) (10 - 40)  SpO2: 95% (14 Oct 2019 23:32) (93% - 99%)        PHYSICAL EXAM:  GENERAL: Not in distress  CHEST/LUNG: Air entry bilaterally  HEART: s1 and s2 present   ABDOMEN: Nontender and Nondistended  EXTREMITIES: Left heel bandage  in placed  SKIN: Necrosis area at left heel and toes        MEDICATIONS  (STANDING):  calcium carbonate    500 mG (Tums) Chewable 1 Tablet(s) Chew every 8 hours  cefepime   IVPB 1000 milliGRAM(s) IV Intermittent every 24 hours  cholecalciferol 1000 Unit(s) Oral daily  collagenase Ointment 1 Application(s) Topical daily  heparin  Injectable 5000 Unit(s) SubCutaneous every 12 hours  lactated ringers. 1000 milliLiter(s) (75 mL/Hr) IV Continuous <Continuous>  methadone    Tablet 80 milliGRAM(s) Oral four times a day  metroNIDAZOLE  IVPB 500 milliGRAM(s) IV Intermittent every 8 hours    MEDICATIONS  (PRN):  acetaminophen   Tablet .. 650 milliGRAM(s) Oral every 6 hours PRN Mild Pain (1 - 3)        Allergies    sulfamethizole (Unknown)          LABS:                        9.3    9.06  )-----------( 243      ( 14 Oct 2019 06:14 )             29.1         10-14    136  |  94<L>  |  54<H>  ----------------------------<  122<H>  4.0   |  26  |  4.3<HH>    Ca    6.7<L>      14 Oct 2019 06:14  Phos  7.8     10-14  Mg     1.7     10-14          CAPILLARY BLOOD GLUCOSE  POCT Blood Glucose.: 122 mg/dL (14 Oct 2019 16:59)  POCT Blood Glucose.: 107 mg/dL (14 Oct 2019 06:03)  POCT Blood Glucose.: 88 mg/dL (14 Oct 2019 01:54)      RADIOLOGY & ADDITIONAL TESTS:    < from: Xray Chest 1 View- PORTABLE-Routine (10.14.19 @ 05:35) >    Stable right basilar opacity.        MICROBIOLOGY DATA:        Culture - Other (10.12.19 @ 18:55)    -  Trimethoprim/Sulfamethoxazole: R >2/38    -  Cefoxitin: S <=8    -  Ciprofloxacin: R >2    -  Ertapenem: S <=0.5    -  Gentamicin: R >8    -  Levofloxacin: R >4    -  Imipenem: S <=1    -  Meropenem: S <=1    -  Piperacillin/Tazobactam: I 32    -  Tobramycin: I 8    -  Amikacin: S <=16    -  Ampicillin: R >16 These ampicillin results predict results for amoxicillin    -  Ampicillin/Sulbactam: R >16/8 Enterobacter, Citrobacter, and Serratia may develop resistance during prolonged therapy (3-4 days)    -  Aztreonam: S <=4    -  Amoxicillin/Clavulanic Acid: I 16/8    -  Cefazolin: S 16 Enterobacter, Citrobacter, and Serratia may develop resistance during prolonged therapy (3-4 days)    -  Cefepime: S <=2    -  Ceftriaxone: S <=1 Enterobacter, Citrobacter, and Serratia may develop resistance during prolonged therapy    Specimen Source: .Other L heel wound    Culture Results:   Few Escherichia coli  Few Enterococcus species "Susceptibilities not performed"  Rare Staphylococcus aureus    Organism Identification: Escherichia coli    Organism: Escherichia coli    Method Type: ALLISON      Culture - Blood (10.12.19 @ 05:41)    Specimen Source: .Blood None    Culture Results:   No growth to date.

## 2019-10-14 NOTE — PROGRESS NOTE ADULT - ASSESSMENT
- SILVINA  Hemodynamic changes  Volume depletion / decrease intake  - CKD  stage 4 baseline creat 3.3 in July 2019  - Hyperkalemia, corrected  - Metabolic acidosis, improved with treatment  Severe anemia - 2nd to CKD, R/O iron deficiency and other causes  Acute resp failure  Hypocalcemia - corrected Ca ~8.7  -Altered mental status    Metabolic encephalopathy, Hypercapnia, also suspected intoxication with CNS affecting medications:       - High dose Gabapentin        - Possible benzodiazepines and narcotics  Severe PAD -  s/p Rt BKA     Sacral and multiple leg/Lt foot ulcers  Sepsis  Renal plan:  Monitor and stabilize vitals  Avoid hypotension  - Continue IV fluids, bicarb normalizing at 23, could change IV fluids to NS or LR if bicarb higher  - check 25 Vit D and iPTH, may give Ca carbonate 500 mg po q8 hrs    U/Prot/Cr  - Renal/bladder sonogram  - Monitor urine out put  - Iron studies    Stool for OB    Transfuse PRBC as needed  - If PRBC not given, when iron studies available, could consider LINDY, Epogen 23285 units QW  Antibiotics as per ID - on Cefipime now    Wound care   Will f/u

## 2019-10-14 NOTE — PROGRESS NOTE ADULT - SUBJECTIVE AND OBJECTIVE BOX
PROGRESS NOTE   Pt seen @ bedside during AM rounds. Dressing +Clean, +Dry, +Intact. Pt. denies any recent n/f/v/c/sob. Pt. denies any other pedal complaints at this time.      Vital Signs Last 24 Hrs  T(C): 36.6 (14 Oct 2019 07:01), Max: 37.2 (13 Oct 2019 11:00)  T(F): 97.8 (14 Oct 2019 07:01), Max: 98.9 (13 Oct 2019 11:00)  HR: 82 (14 Oct 2019 05:03) (79 - 103)  BP: 122/63 (14 Oct 2019 05:03) (85/57 - 130/67)  BP(mean): 86 (14 Oct 2019 05:03) (63 - 95)  RR: 16 (14 Oct 2019 07:01) (10 - 37)  SpO2: 98% (14 Oct 2019 05:03) (96% - 100%)                          9.3    9.06  )-----------( 243      ( 14 Oct 2019 06:14 )             29.1               10-14    136  |  94<L>  |  54<H>  ----------------------------<  122<H>  4.0   |  26  |  4.3<HH>    Ca    6.7<L>      14 Oct 2019 06:14  Phos  7.8     10-14  Mg     1.7     10-14    TPro  4.8<L>  /  Alb  1.6<L>  /  TBili  <0.2  /  DBili  <0.2  /  AST  13  /  ALT  13  /  AlkPhos  146<H>  10-12      PHYSICAL EXAM  Physical Exam -Left Lower Extremity Focused:   Derm:   Open Surgical Wound from the Plantar Heel Extending to the Posterior aspect   Fibrogranular Tissue noted at the wound base . No malodor. No drainage.  Eschar noted at the lateral and proximal aspect of the wound base.     Vascular:   DP and PT Pulses diminished. Skin temperature is warm to cool from proximal to distal. Capillary refill time is > 3 seconds.      Neuro:  Protective sensation moderately diminished.        Assessment:  s/p AKA Right   s/p Calcanectomy Left Foot   Neuropathic    Plan:  Pt. seen and evaluated.  Discussed treatment and condition w/ patient.  A-duplex pending image and report  Wound cleansed with normal saline. Application of santyl/dsd/kerlix  Wound Care Instructions: As stated above  Heel offloaders ordered.  Attending aware and updated, will f/u tomorrow, Tues. 10/15

## 2019-10-14 NOTE — ADVANCED PRACTICE NURSE CONSULT - ASSESSMENT
R buttocks stage four pressure  ulcer. Wound base pale pink, no drainage or foul smell noted. Per patient sees Dr wynn at [facility] and has been doing wet to dry dressing every 3 days at home.  Left  buttocks heeled ulcer with scaring, no drainage or foul smelling noted.  L heel unstageable  ulcer, pt being followed by podiatry.

## 2019-10-14 NOTE — GOALS OF CARE CONVERSATION - ADVANCED CARE PLANNING - CONVERSATION DETAILS
Based on patient's multiple comorbid conditions, goals of care discussion was initiated 10/11 and patient at the time opted to be DNR. However when a MOLST form was presented today for completion and further discussions, patient retracted DNR stating that it upsets his mother.

## 2019-10-14 NOTE — PROGRESS NOTE ADULT - ASSESSMENT
58yM Pmhx of PVD s/p R AKA, paraplegia with multiple chronic bedsores and suprapubic catheter presents for an episode of unresponsiveness witnessed by family members.    1) Right Lower Lobe Infiltrate Likely representing Pneumonitis secondary to possible aspiration    Patient now extubated   Saturating on Room Air   Not in any acute distress.     2) SILVINA likely pre-renal   Follow with Nephrology on future plans for dialysis   Patient not in fluid overload at this time and currently making urine.   Hold all Nephro Toxic Drugs     3) AMS   Likely secondary to [facility] and Medication non compliance  Gabapentin along with several other sedatives held.  Mental status much improved     4) DVT Prophylaxis   Heparin Sub Q    5) Disposition   CCU Monitoring for now   Renal Consult pending   Goals of care discussed patient remains DNR 58yM Pmhx of PVD s/p R AKA, paraplegia with multiple chronic bedsores and suprapubic catheter presents for an episode of unresponsiveness witnessed by family members.    1) Right Lower Lobe Infiltrate Likely representing Pneumonitis secondary to possible aspiration    Patient now extubated   Saturating on Room Air   Not in any acute distress.     2) SILVINA likely pre-renal   Follow with Nephrology on future plans for dialysis   Patient not in fluid overload at this time and currently making urine.   Hold all Nephro Toxic Drugs     3) AMS   Likely secondary to [facility] and Medication non compliance  Gabapentin along with several other sedatives held.  Mental status much improved     4) Persistent Chronic Pain   Patient placed back on Methadone Home dose of 80 mg four times daily.   Dose verified with [facility] at 10:37 am with Shan on 10/14/19  No renal adjustment needed as patients creatinine clearance is 19.     5) DVT Prophylaxis   Heparin Sub Q    6) Disposition   CCU Monitoring for now   Renal Consult pending   Goals of care discussed patient remains DNR 58yM Pmhx of PVD s/p R AKA, paraplegia with multiple chronic bedsores and suprapubic catheter presents for an episode of unresponsiveness witnessed by family members.    1) Right Lower Lobe Infiltrate Likely representing Pneumonitis suspect aspiration pneumonitis  Patient now extubated   Saturating on Room Air   Not in any acute distress.     2) SILVINA likely pre-renal   Follow with Nephrology on future plans for dialysis   Patient not in fluid overload at this time and currently making urine.   Hold all Nephro Toxic Drugs     3) Toxic encephalopathy  Likely secondary to [facility] and Medication non compliance  Gabapentin along with several other sedatives held.  Mental status much improved     4) Persistent Chronic Pain   Patient placed back on Methadone Home dose of 80 mg four times daily.   Dose verified with [facility] at 10:37 am with Shan on 10/14/19  No renal adjustment needed as patients creatinine clearance is 19.     5) DVT Prophylaxis   Heparin Sub Q    6) Disposition   CCU Monitoring for now   Renal Consult pending     7. Decubitus ulcers present on admission  - decub ulcer care and pain control    POC d/w patient, his mother at bedside and DAVIDSON

## 2019-10-14 NOTE — PROGRESS NOTE ADULT - ASSESSMENT
IMPRESSION:  alter mental status secondary to drug over dose opoid   SILVINA on CKD not improving  metabolic acidosis improved  anemia stable     PLAN:    CNS: no sedating medications    HEENT: oral care     PULMONARY: pulmonary toilet    CARDIOVASCULAR: echo,     GI: GI prophylaxis.  diet    RENAL:   follow [facility] renal US   renal f/u      INFECTIOUS DISEASE: pan cx follow ID , d/c vanco now   burn follow up     HEMATOLOGICAL:  DVT prophylaxis.  check substrates    ENDOCRINE:  Follow up FS.  Insulin protocol if needed.    MUSCULOSKELETAL:

## 2019-10-14 NOTE — PHARMACOTHERAPY INTERVENTION NOTE - COMMENTS
I spoke with Dr. Coyne regarding the methadone 80 mg PO Q 6 H. Dr. Coyne stated he verified the dose and frequency with the [facility] (patient's outpatient pharmacy).

## 2019-10-14 NOTE — ADVANCED PRACTICE NURSE CONSULT - RECOMMEDATIONS
Continue lower extremity dressing per podiatry  Apply triad with normal saline moist dressing to sacral wound  skin care  pressures ulcer prevention measures  case discussed with primary  RN, RORY  to f/u as needed

## 2019-10-14 NOTE — PROGRESS NOTE ADULT - SUBJECTIVE AND OBJECTIVE BOX
HPI:  58yM Pmhx of PVD s/p R AKA, paraplegia with multiple chronic bedsores and suprapubic catheter presents for an episode of unresponsiveness witnessed by family members. En route to the ER found to be hypoglycemic, given IV dextrose. In the ER found to be acidotic and continuously unresponsive , even with administration of narcan. Of note, he was recently admitted last month for a similar episode of unresponsiveness which responded to flumazenil and narcan. He revealed at that time that he had taken three xanax of 2 mg. He sees Dr. Gooden for his chronic decubiti. central line was placed by ed team and he was intubated for airway protection and hypercapnia        INTERVAL HPI/OVERNIGHT EVENTS:  ICU Vital Signs Last 24 Hrs  T(C): 36.6 (14 Oct 2019 07:01), Max: 37.2 (13 Oct 2019 11:00)  T(F): 97.8 (14 Oct 2019 07:01), Max: 98.9 (13 Oct 2019 11:00)  HR: 82 (14 Oct 2019 09:03) (78 - 91)  BP: 124/61 (14 Oct 2019 09:03) (85/57 - 130/67)  BP(mean): 87 (14 Oct 2019 09:03) (63 - 95)  ABP: --  ABP(mean): --  RR: 19 (14 Oct 2019 09:03) (10 - 37)  SpO2: 95% (14 Oct 2019 09:03) (95% - 100%)    I&O's Summary    13 Oct 2019 07:01  -  14 Oct 2019 07:00  --------------------------------------------------------  IN: 883 mL / OUT: 1805 mL / NET: -922 mL    14 Oct 2019 07:01  -  14 Oct 2019 10:25  --------------------------------------------------------  IN: 0 mL / OUT: 240 mL / NET: -240 mL          LABS:                        9.3    9.06  )-----------( 243      ( 14 Oct 2019 06:14 )             29.1     10-14    136  |  94<L>  |  54<H>  ----------------------------<  122<H>  4.0   |  26  |  4.3<HH>    Ca    6.7<L>      14 Oct 2019 06:14  Phos  7.8     10-14  Mg     1.7     10-14    TPro  4.8<L>  /  Alb  1.6<L>  /  TBili  <0.2  /  DBili  <0.2  /  AST  13  /  ALT  13  /  AlkPhos  146<H>  10-12        CAPILLARY BLOOD GLUCOSE      POCT Blood Glucose.: 107 mg/dL (14 Oct 2019 06:03)  POCT Blood Glucose.: 88 mg/dL (14 Oct 2019 01:54)  POCT Blood Glucose.: 91 mg/dL (13 Oct 2019 18:01)  POCT Blood Glucose.: 119 mg/dL (13 Oct 2019 12:06)    ABG - ( 13 Oct 2019 09:33 )  pH, Arterial: 7.45  pH, Blood: x     /  pCO2: 44    /  pO2: 134   / HCO3: 31    / Base Excess: 6.4   /  SaO2: 99            Consultant(s) Notes Reviewed:  [x ] YES  [ ] NO    MEDICATIONS  (STANDING):  cefepime   IVPB 1000 milliGRAM(s) IV Intermittent every 24 hours  chlorhexidine 0.12% Liquid 15 milliLiter(s) Oral Mucosa every 12 hours  collagenase Ointment 1 Application(s) Topical daily  dexmedetomidine Infusion 0.2 MICROgram(s)/kG/Hr (3.43 mL/Hr) IV Continuous <Continuous>  fentaNYL   Infusion. 0.5 MICROgram(s)/kG/Hr (3.43 mL/Hr) IV Continuous <Continuous>  heparin  Injectable 5000 Unit(s) SubCutaneous every 12 hours  metroNIDAZOLE  IVPB 500 milliGRAM(s) IV Intermittent every 8 hours  norepinephrine Infusion 0.05 MICROgram(s)/kG/Min (3.216 mL/Hr) IV Continuous <Continuous>    MEDICATIONS  (PRN):  acetaminophen   Tablet .. 650 milliGRAM(s) Oral every 6 hours PRN Mild Pain (1 - 3)      PHYSICAL EXAM:  GENERAL: not in any acute distress.   HEAD:  Atraumatic, Normocephalic  EYES: EOMI, PERRLA, conjunctiva and sclera clear  ENT: No tonsillar erythema, exudates, or enlargement; Moist mucous membranes, Good dentition, No lesions  NECK: Supple, No JVD, Normal thyroid, no enlarged nodes  NERVOUS SYSTEM:  Alert & Oriented X3, Motor Strength 5/5  CHEST/LUNG: B/L good air entry; No rales, rhonchi, or wheezing  HEART: S1S2 normal, no S3, Regular rate and rhythm; No murmurs, rubs, or gallops  ABDOMEN: Soft, Nontender, Nondistended; Bowel sounds present      Care Discussed with Consultants/Other Providers [ x] YES  [ ] NO HPI:  58yM Pmhx of PVD s/p R AKA, paraplegia with multiple chronic bedsores and suprapubic catheter presents for an episode of unresponsiveness witnessed by family members. En route to the ER found to be hypoglycemic, given IV dextrose. In the ER found to be acidotic and continuously unresponsive , even with administration of narcan. Of note, he was recently admitted last month for a similar episode of unresponsiveness which responded to flumazenil and narcan. He revealed at that time that he had taken three xanax of 2 mg. He sees Dr. Gooden for his chronic decubiti. central line was placed by ed team and he was intubated for airway protection and hypercapnia        INTERVAL HPI/OVERNIGHT EVENTS:    Patient was extubated yesterday and had no acute event overnight. Seen and evaluated earlier, denied sob palpitations, fevers or chills and has no new complaint     ICU Vital Signs Last 24 Hrs  T(C): 36.6 (14 Oct 2019 07:01), Max: 37.2 (13 Oct 2019 11:00)  T(F): 97.8 (14 Oct 2019 07:01), Max: 98.9 (13 Oct 2019 11:00)  HR: 82 (14 Oct 2019 09:03) (78 - 91)  BP: 124/61 (14 Oct 2019 09:03) (85/57 - 130/67)  BP(mean): 87 (14 Oct 2019 09:03) (63 - 95)  ABP: --  ABP(mean): --  RR: 19 (14 Oct 2019 09:03) (10 - 37)  SpO2: 95% (14 Oct 2019 09:03) (95% - 100%)    I&O's Summary    13 Oct 2019 07:01  -  14 Oct 2019 07:00  --------------------------------------------------------  IN: 883 mL / OUT: 1805 mL / NET: -922 mL    14 Oct 2019 07:01  -  14 Oct 2019 10:25  --------------------------------------------------------  IN: 0 mL / OUT: 240 mL / NET: -240 mL          LABS:                        9.3    9.06  )-----------( 243      ( 14 Oct 2019 06:14 )             29.1     10-14    136  |  94<L>  |  54<H>  ----------------------------<  122<H>  4.0   |  26  |  4.3<HH>    Ca    6.7<L>      14 Oct 2019 06:14  Phos  7.8     10-14  Mg     1.7     10-14    TPro  4.8<L>  /  Alb  1.6<L>  /  TBili  <0.2  /  DBili  <0.2  /  AST  13  /  ALT  13  /  AlkPhos  146<H>  10-12        CAPILLARY BLOOD GLUCOSE      POCT Blood Glucose.: 107 mg/dL (14 Oct 2019 06:03)  POCT Blood Glucose.: 88 mg/dL (14 Oct 2019 01:54)  POCT Blood Glucose.: 91 mg/dL (13 Oct 2019 18:01)  POCT Blood Glucose.: 119 mg/dL (13 Oct 2019 12:06)    ABG - ( 13 Oct 2019 09:33 )  pH, Arterial: 7.45  pH, Blood: x     /  pCO2: 44    /  pO2: 134   / HCO3: 31    / Base Excess: 6.4   /  SaO2: 99            Consultant(s) Notes Reviewed:  [x ] YES  [ ] NO    MEDICATIONS  (STANDING):  cefepime   IVPB 1000 milliGRAM(s) IV Intermittent every 24 hours  chlorhexidine 0.12% Liquid 15 milliLiter(s) Oral Mucosa every 12 hours  collagenase Ointment 1 Application(s) Topical daily  dexmedetomidine Infusion 0.2 MICROgram(s)/kG/Hr (3.43 mL/Hr) IV Continuous <Continuous>  fentaNYL   Infusion. 0.5 MICROgram(s)/kG/Hr (3.43 mL/Hr) IV Continuous <Continuous>  heparin  Injectable 5000 Unit(s) SubCutaneous every 12 hours  metroNIDAZOLE  IVPB 500 milliGRAM(s) IV Intermittent every 8 hours  norepinephrine Infusion 0.05 MICROgram(s)/kG/Min (3.216 mL/Hr) IV Continuous <Continuous>    MEDICATIONS  (PRN):  acetaminophen   Tablet .. 650 milliGRAM(s) Oral every 6 hours PRN Mild Pain (1 - 3)      PHYSICAL EXAM:  GENERAL: not in any acute distress.   HEAD:  Atraumatic, Normocephalic  EYES: EOMI, PERRLA, conjunctiva and sclera clear  ENT: No tonsillar erythema, exudates, or enlargement; Moist mucous membranes, Good dentition  NECK: Supple, No JVD, Normal thyroid, no enlarged nodes  NERVOUS SYSTEM:  Alert & Oriented X3, Motor Strength 5/5  CHEST/LUNG: B/L good air entry; No rales, rhonchi, or wheezing  HEART: S1S2 normal, no S3, Regular rate and rhythm; No murmurs, rubs, or gallops  ABDOMEN: Soft, Nontender, Nondistended; Bowel sounds present      Care Discussed with Consultants/Other Providers [ x] YES  [ ] NO

## 2019-10-14 NOTE — PROGRESS NOTE ADULT - SUBJECTIVE AND OBJECTIVE BOX
Patient is a 58y old  Male who presents with a chief complaint of unresponsive at home (14 Oct 2019 07:52)        Interval Events: No overnight events.    REVIEW OF SYSTEMS:  Constitutional: No fevers or chills. No weight loss. No fatigue or generalized malaise.  Eyes: No itching or discharge from the eyes  ENT: No ear pain. No ear discharge. No nasal congestion. No post nasal drip. No epistaxis. No throat pain. No sore throat. No difficulty swallowing.   CV: No chest pain. No palpitations. No lightheadedness or dizziness.   Resp: No dyspnea at rest. No dyspnea on exertion. No orthopnea. No wheezing. No cough. No stridor. No sputum production. No chest pain with respiration.  GI: No nausea. No vomiting. No diarrhea.  MSK: No joint pain or pain in any extremities  Integumentary: No skin lesions. No pedal edema.  Neurological: No gross motor weakness. No sensory changes.      OBJECTIVE:  ICU Vital Signs Last 24 Hrs  T(C): 36.6 (14 Oct 2019 07:01), Max: 37.2 (13 Oct 2019 11:00)  T(F): 97.8 (14 Oct 2019 07:01), Max: 98.9 (13 Oct 2019 11:00)  HR: 82 (14 Oct 2019 09:03) (78 - 91)  BP: 124/61 (14 Oct 2019 09:03) (85/57 - 130/67)  BP(mean): 87 (14 Oct 2019 09:03) (63 - 95)  ABP: --  ABP(mean): --  RR: 19 (14 Oct 2019 09:03) (10 - 37)  SpO2: 95% (14 Oct 2019 09:03) (95% - 100%)        10-13 @ 07:01  -  10-14 @ 07:00  --------------------------------------------------------  IN: 883 mL / OUT: 1805 mL / NET: -922 mL    10-14 @ 07:01  -  10-14 @ 10:13  --------------------------------------------------------  IN: 0 mL / OUT: 240 mL / NET: -240 mL      CAPILLARY BLOOD GLUCOSE      POCT Blood Glucose.: 107 mg/dL (14 Oct 2019 06:03)      PHYSICAL EXAM:  General: Awake, alert, oriented X 3.   HEENT: Atraumatic, normocephalic.                 Mallampatti Grade                 No nasal congestion.                No tonsillar or pharyngeal exudates.  Lymph Nodes: No palpable lymphadenopathy neck, supraclavicular region  Neck: No JVD. No carotid bruit, no thyromegally  Respiratory: Normal chest expansion                         Normal percussion                         decreased equal air entry                         No wheeze, rhonchi or rales.  Cardiovascular: S1 S2 normal. No murmurs, rubs or gallops.   Abdomen: Soft, non-tender, non-distended. No organomegaly.  Extremities: Warm to touch. Peripheral pulse palpable. No pedal edema.   Skin: No rashes or skin lesions, warm dry  Neurological: Motor and sensory examination equal and normal in all four extremities.  Psychiatry: Appropriate mood and affect.    HOSPITAL MEDICATIONS:  MEDICATIONS  (STANDING):  cefepime   IVPB 1000 milliGRAM(s) IV Intermittent every 24 hours  chlorhexidine 0.12% Liquid 15 milliLiter(s) Oral Mucosa every 12 hours  collagenase Ointment 1 Application(s) Topical daily  dexmedetomidine Infusion 0.2 MICROgram(s)/kG/Hr (3.43 mL/Hr) IV Continuous <Continuous>  fentaNYL   Infusion. 0.5 MICROgram(s)/kG/Hr (3.43 mL/Hr) IV Continuous <Continuous>  heparin  Injectable 5000 Unit(s) SubCutaneous every 12 hours  metroNIDAZOLE  IVPB 500 milliGRAM(s) IV Intermittent every 8 hours  norepinephrine Infusion 0.05 MICROgram(s)/kG/Min (3.216 mL/Hr) IV Continuous <Continuous>    MEDICATIONS  (PRN):  acetaminophen   Tablet .. 650 milliGRAM(s) Oral every 6 hours PRN Mild Pain (1 - 3)      LABS:                        9.3    9.06  )-----------( 243      ( 14 Oct 2019 06:14 )             29.1     10-14    136  |  94<L>  |  54<H>  ----------------------------<  122<H>  4.0   |  26  |  4.3<HH>    Ca    6.7<L>      14 Oct 2019 06:14  Phos  7.8     10-14  Mg     1.7     10-14    TPro  4.8<L>  /  Alb  1.6<L>  /  TBili  <0.2  /  DBili  <0.2  /  AST  13  /  ALT  13  /  AlkPhos  146<H>  10-12        Arterial Blood Gas:  10-13 @ 09:33  7.45/44/134/31/99/6.4  ABG lactate: --  Arterial Blood Gas:  10-13 @ 07:38  7.52/36/92/30/98/6.5  ABG lactate: --            ABG - ( 13 Oct 2019 09:33 )  pH, Arterial: 7.45  pH, Blood: x     /  pCO2: 44    /  pO2: 134   / HCO3: 31    / Base Excess: 6.4   /  SaO2: 99                  RADIOLOGY:Radiology personally reviewed.

## 2019-10-14 NOTE — PROGRESS NOTE ADULT - ASSESSMENT
58 M Pmhx of PVD s/p R AKA, paraplegia with multiple chronic bedsores and suprapubic catheter, previous hospitalization for AMS that responded to flumazenil and narcan, presents for an episode of unresponsiveness witnessed by family members found to be hypoglycemic    IMPRESSION  #Possible Aspiration PNA, RLL opacity on CXR    hypothermic on admission, does not meet sepsis criteria  #Acute resp failure hypercapnia secondary to metabolic encephalopathy  #L foot necrotic ulcer, suspect OM      #Sacral decub buttocks, clean   #Hyponatremia, resolved  #Previous ucx 9/2019 kleb (R fluoro), kluyvera (R bactrim, fluoro, unasyn)  #Abx allergy: sulfamethizole (Unknown)    RECOMMENDATIONS  - Cefepime 1g q24h & IV flagyl 500mg q8h IV  - f/u AM Vanc level. If >15 HOLD, If <15 dose 1g and recheck level tomorrow AM  - ESR/CRP   - F/u wcx L foot   - xray L foot  - Needs debridement of L heel, follows with Burn 58 M Pmhx of PVD s/p R AKA, paraplegia with multiple chronic bedsores and suprapubic catheter, previous hospitalization for AMS that responded to flumazenil and narcan, presents for an episode of unresponsiveness witnessed by family members found to be hypoglycemic .    IMPRESSION  #Possible Aspiration PNA, RLL opacity on CXR    hypothermic on admission, does not meet sepsis criteria  #Acute resp failure hypercapnia secondary to metabolic encephalopathy  #L foot necrotic ulcer, suspect OM      #Sacral decub buttocks, clean   #Hyponatremia, resolved  #Previous ucx 9/2019 kleb (R fluoro), kluyvera (R bactrim, fluoro, unasyn)  #Abx allergy: sulfamethizole (Unknown)     would recommend:    1. Dose of Vancomycin since wound culture grew Staph. aureus, sensitivity is pending  2. Continue Cefepime 1g q24h & IV flagyl 500mg q8h IV  3. Vancomycin  level in AM and Redose based on the level    4. Needs debridement of L heel, follows with Burn    -d/w CCU team

## 2019-10-14 NOTE — PROGRESS NOTE ADULT - SUBJECTIVE AND OBJECTIVE BOX
Nephrology progress note    Patient is seen and examined, events over the last 24 h noted .  zdenies complaints  Allergies:  sulfamethizole (Unknown)    Hospital Medications:   MEDICATIONS  (STANDING):  cefepime   IVPB 1000 milliGRAM(s) IV Intermittent every 24 hours  chlorhexidine 0.12% Liquid 15 milliLiter(s) Oral Mucosa every 12 hours  collagenase Ointment 1 Application(s) Topical daily  dexmedetomidine Infusion 0.2 MICROgram(s)/kG/Hr (3.43 mL/Hr) IV Continuous <Continuous>  fentaNYL   Infusion. 0.5 MICROgram(s)/kG/Hr (3.43 mL/Hr) IV Continuous <Continuous>  heparin  Injectable 5000 Unit(s) SubCutaneous every 12 hours  lactated ringers. 1000 milliLiter(s) (75 mL/Hr) IV Continuous <Continuous>  methadone    Tablet 80 milliGRAM(s) Oral four times a day  metroNIDAZOLE  IVPB 500 milliGRAM(s) IV Intermittent every 8 hours  norepinephrine Infusion 0.05 MICROgram(s)/kG/Min (3.216 mL/Hr) IV Continuous <Continuous>        VITALS:  T(F): 98.1 (10-14-19 @ 11:00), Max: 98.5 (10-13-19 @ 23:03)  HR: 80 (10-14-19 @ 14:03)  BP: 141/72 (10-14-19 @ 14:03)  RR: 17 (10-14-19 @ 14:03)  SpO2: 97% (10-14-19 @ 14:03)  Wt(kg): --    10-12 @ 07:01  -  10-13 @ 07:00  --------------------------------------------------------  IN: 4936 mL / OUT: 2025 mL / NET: 2911 mL    10-13 @ 07:01  -  10-14 @ 07:00  --------------------------------------------------------  IN: 883 mL / OUT: 1805 mL / NET: -922 mL    10-14 @ 07:01  -  10-14 @ 15:19  --------------------------------------------------------  IN: 137.5 mL / OUT: 900 mL / NET: -762.5 mL      Height (cm): 167.64 (10-14 @ 10:23)  Weight (kg): 68.6 (10-14 @ 10:23)  BMI (kg/m2): 24.4 (10-14 @ 10:23)  BSA (m2): 1.78 (10-14 @ 10:23)    PHYSICAL EXAM:  Constitutional: NAD  HEENT: anicteric sclera, oropharynx clear, MMM  Neck: No JVD  Respiratory: CTA  Cardiovascular: S1, S2, RRR  Gastrointestinal: BS+, soft, NT/ND  Extremities: Rt BKA. No peripheral edema  Neurological: A/O x 3  : No CVA tenderness. Has brown.   Skin: No rashes  Vascular Access:    LABS:  10-14    136  |  94<L>  |  54<H>  ----------------------------<  122<H>  4.0   |  26  |  4.3<HH>  Creatinine Trend: 4.3<--, 4.6<--, 4.3<--, 4.8<--, 4.8<--, 5.0<--  Ca    6.7<L>      14 Oct 2019 06:14  Phos  7.8     10-14  Mg     1.7     10-14    TPro  4.8<L>  /  Alb  1.6<L>  /  TBili  <0.2  /  DBili  <0.2  /  AST  13  /  ALT  13  /  AlkPhos  146<H>  10-12                          9.3    9.06  )-----------( 243      ( 14 Oct 2019 06:14 )             29.1       Urine Studies:      RADIOLOGY & ADDITIONAL STUDIES:  < from: Xray Chest 1 View- PORTABLE-Routine (10.14.19 @ 05:35) >    Stable right basilar opacity.    < end of copied text >

## 2019-10-15 NOTE — ED PROVIDER NOTE - DISPOSITION TYPE
Mom is requesting two albuterol inhalers- one for home and one for school.    Called the pharmacy to see if they can transfer refills for Flovent and albuterol neb solution from [facility] to [address] [place] [address].  The tech who answered stated that he doesn't have a profile with [facility], and new prescriptions need to be sent.      Last PE: 8/12/19  Last refill: 6/3/19- albuterol neb, and Flovent.  No albuterol inhaler on med list, but Dr. Grullon did talk about him having one in previous note.    Will route to Dr. Emerson for approval as mom is requesting refills today.     ADMIT

## 2019-10-15 NOTE — PROGRESS NOTE ADULT - ASSESSMENT
58yM Pmhx of PVD s/p R AKA, paraplegia with multiple chronic bedsores and suprapubic catheter presents for an episode of unresponsiveness witnessed by family members.    1) Right Lower Lobe Infiltrate Likely representing Pneumonitis suspect aspiration pneumonitis  Patient now extubated   Saturating on Room Air   Not in any acute distress.     2) SILVINA likely pre-renal   Follow with Nephrology on future plans for dialysis   Patient not in fluid overload at this time and currently making urine.   Hold all Nephro Toxic Drugs   Renal Ultrasound Pending     3) Toxic encephalopathy  Likely secondary to [facility] and Medication non compliance  Gabapentin along with several other sedatives held.  Mental status much improved     4) Persistent Chronic Pain   Patient placed back on Methadone Home dose of 80 mg four times daily.   Dose verified with [facility] at 10:37 am with Shan on 10/14/19  No renal adjustment needed as patients creatinine clearance is 19.     5) DVT Prophylaxis   Heparin Sub Q    6) Disposition   Patient downgraded to Floor    Renal Consult pending     7. Decubitus ulcers present on admission  - To be followed by ID   -Discontinue all antibiotics and observe for now. 58yM Pmhx of PVD s/p R AKA, paraplegia with multiple chronic bedsores and suprapubic catheter presents for an episode of unresponsiveness witnessed by family members.    1) Right Lower Lobe Infiltrate Likely representing Pneumonitis suspect aspiration pneumonitis  Patient now extubated   Saturating on Room Air   Not in any acute distress.     2) SILVINA likely pre-renal   Follow with Nephrology on future plans for dialysis   Patient not in fluid overload at this time and currently making urine.   Hold all Nephro Toxic Drugs   Renal Ultrasound Pending     3) Toxic encephalopathy  Likely secondary to [facility] and Medication non compliance  Gabapentin along with several other sedatives held.  Mental status much improved     4) Persistent Chronic Pain   Patient placed back on Methadone Home dose of 80 mg four times daily.   Dose verified with [facility] at 10:37 am with Shan on 10/14/19  No renal adjustment needed as patients creatinine clearance is 19.     5. Decubitus ulcers present on admission/ Functional quad/ PVD s/p RAKA  - To be followed by ID   -Discontinue all antibiotics and observe for now.         DVT Prophylaxis   Heparin Sub Q     Disposition   Patient downgraded to Floor    Renal Consult pending     Discharge planning

## 2019-10-15 NOTE — PROGRESS NOTE ADULT - ATTENDING COMMENTS
Attending Statement: I have personally performed a face to face diagnostic evaluation on this patient. I have written the above note pertaining to the history, exam and plan of care.
Attending Statement: I have personally performed a face to face diagnostic evaluation on this patient. I have written the above note pertaining to the history, exam and plan of care.
excisional debridement with 15 blade performed to the level of subcutaneous tissue  wash heel with soap and water and continue santyl dressings
Seen abd evaluated with ICU team. Agree with care
58yM Pmhx of PVD s/p R AKA, paraplegia with multiple chronic bedsores and suprapubic catheter presents for an episode of unresponsiveness witnessed by family members. Patient was intubated and had an uneventful extubation yesterday. Currently improving with no new complaints. Revisited goals of care conversation and patient witnessed by RN wants to be full code and did not wish to review the MOLST form. refer to GO documentation    Patient was seen and evaluated with the CCU team, POC discussed and agree with plan

## 2019-10-15 NOTE — CHART NOTE - NSCHARTNOTEFT_GEN_A_CORE
Registered Dietitian Follow-Up     Patient Profile Reviewed                           Yes [v]   No []     Nutrition History Previously Obtained        Yes []  No [v]  - nutrition hx obtained today. Pt reports good appetite at home, on regular diet, takes Nutrament shakes at home, no vitamin/mineral supplements, NKFA. NO chewing/swallowing difficulties reported. Pt states that he was "heavy" in the past but now maintains mostly stable weight  - UBW fluctuates 150-170lbs per pt report. Yesterday's weight 72.8kg (10/14) compared to admission weight 68.8kg (10/11) and      Pertinent Subjective Information:     Pertinent Medical Interventions:     Diet order:     Anthropometrics:  - Ht.  - Wt.  - %wt change  - BMI  - IBW     Pertinent Lab Data:     Pertinent Meds:     Physical Findings:  - Appearance:  - GI function:  - Tubes:  - Oral/Mouth cavity:  - Skin:     Nutrition Requirements  Weight Used:     Estimated Energy Needs    Continue []  Adjust []  Adjusted Energy Recommendations:   kcal/day        Estimated Protein Needs    Continue []  Adjust []  Adjusted Protein Recommendations:   gm/day        Estimated Fluid Needs        Continue []  Adjust []  Adjusted Fluid Recommendations:   mL/day     Nutrient Intake:        [] Previous Nutrition Diagnosis:            [] Ongoing          [] Resolved    [] No active nutrition diagnosis identified at this time     Nutrition Diagnostic #1  Problem:  Etiology:  Statement:     Nutrition Diagnostic #2  Problem:  Etiology:  Statement:     Nutrition Intervention      Goal/Expected Outcome:     Indicator/Monitoring: Registered Dietitian Follow-Up     Patient Profile Reviewed                           Yes [v]   No []     Nutrition History Previously Obtained        Yes []  No [v]  - nutrition hx obtained today. Pt reports good appetite at home, on regular diet, takes Nutrament shakes at home, no vitamin/mineral supplements, NKFA. NO chewing/swallowing difficulties reported. Pt states that he was "heavy" in the past but now maintains mostly stable weight  - UBW fluctuates 150-170lbs per pt report. Yesterday's weight 72.8kg (10/14) compared to admission weight 68.8kg (10/11) and      Pertinent Subjective Information: Pt reports very good appetite, tolerating diet. No GI complains.      Pertinent Medical Interventions: Acute respiratory failure 2/2 pneumonitis suspect aspiration pneumonitis as per MD - extubated. SILVINA likely pre-renal per MD, CKD stage 4 - Nephrology following. Toxic encephalopathy - likely secondary to polypharmacy and medication non compliance per MD. Sacral and multiple leg/lt foot ulcers - ID on board, Burn team was following pta. Downgrade to floor today. SLP to follow however pt is tolerating diet (as per RN and pt report)     Diet order: Renal (for pts receiving RRT)      Anthropometrics:  - Ht. 6'1" as reported by pt (vs documented 5'6')  - Wt. 66.8kg (10/11) vs 72.8kg (11/14) - for calculations will use lowest/admission weight  - %wt change  - BMI - 21.1 (adjusted for rt BKA)   - IBW- 78.7kg +/- 10% (adjusted for rt BKA)      Pertinent Lab Data: (10/15) H/H- 9.9/31.9, BUN 50, Cr- 4.3, ALb 1.7, 10/12 (10/14) vitamin D, 25 hydroxy <5, Calcium 7.0, (10/12) CRP- 11.25, 10/14 Mg 1.7     Pertinent Meds: heparin, IV abx, methadone, calcium carbonate, cholecalciferol      Physical Findings:  - Appearance: alert, oriented  - GI function: last BM 10/13, no GI complains  - Tubes: none  - Oral/Mouth cavity: no chewing/ swallowing difficulties reported,   - Skin: stage IV- sacrum and rt buttocks, unstageable injury to lt buttocks and lt heel.      Nutrition Requirements  Weight Used: 68.8kg - lowest this admission      Estimated Energy Needs    Continue []  Adjust [v]  Adjusted Energy Recommendations:   2065-2410kcal/day (30-35kcal/kg act wt, can go up to 40kcal/kg for multiple pressure ulcers)         Estimated Protein Needs    Continue []  Adjust [v]  Adjusted Protein Recommendations: 70-90  gm/day (1-1.3gm/kg act wt) - multiple pressure ulcers and SILVINA on CKD 4 considered        Estimated Fluid Needs        Continue [c]  Adjust []  Adjusted Fluid Recommendations:   per CCU team (or 1ml/kcal)       Nutrient Intake: Pt reports good appetite, as per flow consumed ate % trays (s/p extubation)        [v] Previous Nutrition Diagnosis: Inadequate Energy Intake - resolving              [] No active nutrition diagnosis identified at this time     Nutrition Diagnostic #1  Problem:  Etiology:  Statement:     Nutrition Diagnostic #2  Problem:  Etiology:  Statement:     Nutrition Intervention: Meal and snacks. Supplements.      Goal/Expected Outcome: PO intake > 75% meals, snacks and supplements over the next 3 days.      Indicator/Monitoring: Will monitor energy intake, diet order, labs, body composition, NFPF.     Recommended: Please liberalize diet to low Na - consistency per SLP. Correct Mg, supplement vitamin D. Obtain HbA1C. Add Prosource Gelatein Plus q 12hrs and Rene 1pkt q 12hrs (pt does not want any of the Ensure drinks or puddings, no Beneprotein on Prosource either). Add MVi daily, timain C - 200mg daily and Zinc sulfate 220mg daily x 14 days.

## 2019-10-15 NOTE — PROGRESS NOTE ADULT - SUBJECTIVE AND OBJECTIVE BOX
Patient is a 58y old  Male who presents with a chief complaint of unresponsive at home (15 Oct 2019 08:27)      INTERVAL HPI/OVERNIGHT EVENTS: no over night events.   ICU Vital Signs Last 24 Hrs  T(C): 35.9 (15 Oct 2019 07:00), Max: 36.9 (14 Oct 2019 19:00)  T(F): 96.7 (15 Oct 2019 07:00), Max: 98.5 (14 Oct 2019 19:00)  HR: 85 (15 Oct 2019 09:26) (71 - 91)  BP: 143/92 (15 Oct 2019 09:26) (116/72 - 180/95)  BP(mean): 112 (15 Oct 2019 09:26) (87 - 130)  ABP: --  ABP(mean): --  RR: 18 (15 Oct 2019 09:26) (11 - 40)  SpO2: 96% (15 Oct 2019 09:26) (92% - 98%)    I&O's Summary    14 Oct 2019 07:01  -  15 Oct 2019 07:00  --------------------------------------------------------  IN: 1837.5 mL / OUT: 3375 mL / NET: -1537.5 mL    15 Oct 2019 07:01  -  15 Oct 2019 09:51  --------------------------------------------------------  IN: 225 mL / OUT: 200 mL / NET: 25 mL          LABS:                        9.9    10.22 )-----------( 231      ( 15 Oct 2019 06:21 )             31.9     10-15    138  |  99  |  50<H>  ----------------------------<  70  4.1   |  24  |  4.3<HH>    Ca    7.4<L>      15 Oct 2019 06:21  Phos  7.8     10-14  Mg     1.7     10-14    TPro  5.2<L>  /  Alb  1.7<L>  /  TBili  <0.2  /  DBili  x   /  AST  14  /  ALT  10  /  AlkPhos  158<H>  10-15        CAPILLARY BLOOD GLUCOSE      POCT Blood Glucose.: 74 mg/dL (15 Oct 2019 06:28)  POCT Blood Glucose.: 101 mg/dL (15 Oct 2019 00:00)  POCT Blood Glucose.: 122 mg/dL (14 Oct 2019 16:59)        RADIOLOGY & ADDITIONAL TESTS:    Consultant(s) Notes Reviewed:  [x ] YES  [ ] NO    MEDICATIONS  (STANDING):  calcium carbonate    500 mG (Tums) Chewable 1 Tablet(s) Chew every 8 hours  cefepime   IVPB 1000 milliGRAM(s) IV Intermittent every 24 hours  cholecalciferol 1000 Unit(s) Oral daily  collagenase Ointment 1 Application(s) Topical daily  collagenase Ointment 1 Application(s) Topical daily  heparin  Injectable 5000 Unit(s) SubCutaneous every 12 hours  lactated ringers. 1000 milliLiter(s) (75 mL/Hr) IV Continuous <Continuous>  methadone    Tablet 80 milliGRAM(s) Oral four times a day  metroNIDAZOLE  IVPB 500 milliGRAM(s) IV Intermittent every 8 hours    MEDICATIONS  (PRN):  acetaminophen   Tablet .. 650 milliGRAM(s) Oral every 6 hours PRN Mild Pain (1 - 3)      PHYSICAL EXAM:  GENERAL: no Acute distress   HEAD:  Atraumatic, Normocephalic  EYES: EOMI, PERRLA, conjunctiva and sclera clear  ENT: No tonsillar erythema, exudates, or enlargement; Moist mucous membranes, Good dentition, No lesions  NECK: Supple, No JVD, Normal thyroid, no enlarged nodes  NERVOUS SYSTEM:  Alert & Oriented X3  CHEST/LUNG: B/L good air entry; No rales, rhonchi, or wheezing  HEART: S1S2 normal, no S3, Regular rate and rhythm; No murmurs, rubs, or gallops  ABDOMEN: Soft, Nontender, Nondistended; Bowel sounds present        Care Discussed with Consultants/Other Providers [ x] YES  [ ] NO Transfer note:  Patient is a 58yM Pmhx of PVD s/p R AKA, paraplegia with multiple chronic bedsores and chronic suprapubic catheter presented 4 days ago for an episode of unresponsiveness witnessed by family members. En route to the ER was found to be hypoglycemic, received  IV dextrose. In the ER found to be acidotic and continuously unresponsive , even with administration of narcan. and he was intubated for airway protection and hypercapnia. Patient was managed in MICU and was extubated successfully 10/13 and since continued to improve. While in ICU goals of care discussion was held twice he initially wished to be DNR, but rescinded DNR a day later in ICU       INTERVAL HPI/OVERNIGHT EVENTS:   Seen and evauted earlier.   no over night events.       ICU Vital Signs Last 24 Hrs  T(C): 35.9 (15 Oct 2019 07:00), Max: 36.9 (14 Oct 2019 19:00)  T(F): 96.7 (15 Oct 2019 07:00), Max: 98.5 (14 Oct 2019 19:00)  HR: 85 (15 Oct 2019 09:26) (71 - 91)  BP: 143/92 (15 Oct 2019 09:26) (116/72 - 180/95)  BP(mean): 112 (15 Oct 2019 09:26) (87 - 130)  ABP: --  ABP(mean): --  RR: 18 (15 Oct 2019 09:26) (11 - 40)  SpO2: 96% (15 Oct 2019 09:26) (92% - 98%)    I&O's Summary    14 Oct 2019 07:01  -  15 Oct 2019 07:00  --------------------------------------------------------  IN: 1837.5 mL / OUT: 3375 mL / NET: -1537.5 mL    15 Oct 2019 07:01  -  15 Oct 2019 09:51  --------------------------------------------------------  IN: 225 mL / OUT: 200 mL / NET: 25 mL          LABS:                        9.9    10.22 )-----------( 231      ( 15 Oct 2019 06:21 )             31.9     10-15    138  |  99  |  50<H>  ----------------------------<  70  4.1   |  24  |  4.3<HH>    Ca    7.4<L>      15 Oct 2019 06:21  Phos  7.8     10-14  Mg     1.7     10-14    TPro  5.2<L>  /  Alb  1.7<L>  /  TBili  <0.2  /  DBili  x   /  AST  14  /  ALT  10  /  AlkPhos  158<H>  10-15        CAPILLARY BLOOD GLUCOSE      POCT Blood Glucose.: 74 mg/dL (15 Oct 2019 06:28)  POCT Blood Glucose.: 101 mg/dL (15 Oct 2019 00:00)  POCT Blood Glucose.: 122 mg/dL (14 Oct 2019 16:59)        RADIOLOGY & ADDITIONAL TESTS:    Consultant(s) Notes Reviewed:  [x ] YES  [ ] NO    MEDICATIONS  (STANDING):  calcium carbonate    500 mG (Tums) Chewable 1 Tablet(s) Chew every 8 hours  cefepime   IVPB 1000 milliGRAM(s) IV Intermittent every 24 hours  cholecalciferol 1000 Unit(s) Oral daily  collagenase Ointment 1 Application(s) Topical daily  collagenase Ointment 1 Application(s) Topical daily  heparin  Injectable 5000 Unit(s) SubCutaneous every 12 hours  lactated ringers. 1000 milliLiter(s) (75 mL/Hr) IV Continuous <Continuous>  methadone    Tablet 80 milliGRAM(s) Oral four times a day  metroNIDAZOLE  IVPB 500 milliGRAM(s) IV Intermittent every 8 hours    MEDICATIONS  (PRN):  acetaminophen   Tablet .. 650 milliGRAM(s) Oral every 6 hours PRN Mild Pain (1 - 3)      PHYSICAL EXAM:  GENERAL: no Acute distress   HEAD:  Atraumatic, Normocephalic  EYES: EOMI, PERRLA, conjunctiva and sclera clear  ENT: No tonsillar erythema, exudates, or enlargement; Moist mucous membranes, Good dentition, No lesions  NECK: Supple, No JVD, Normal thyroid, no enlarged nodes  NERVOUS SYSTEM:  Alert & Oriented X3  CHEST/LUNG: B/L good air entry; No rales, rhonchi, or wheezing  HEART: S1S2 normal, no S3, Regular rate and rhythm; No murmurs, rubs, or gallops  ABDOMEN: Soft, Nontender, Nondistended; Bowel sounds present        Care Discussed with Consultants/Other Providers [ x] YES  [ ] NO Transfer note:  Patient is a 58yM Pmhx of PVD s/p R AKA, paraplegia with multiple chronic bedsores and chronic suprapubic catheter presented 4 days ago for an episode of unresponsiveness witnessed by family members. En route to the ER was found to be hypoglycemic, received  IV dextrose. In the ER found to be acidotic and continuously unresponsive , even with administration of narcan. and he was intubated for airway protection and hypercapnia. Patient was managed in MICU and was extubated successfully 10/13 and since continued to improve. While in ICU goals of care discussion was held twice he initially wished to be DNR, but rescinded DNR a day later in ICU. Follow up nephrology recommendation and start discharge planning based on that.       INTERVAL HPI/OVERNIGHT EVENTS:   Seen and evauted earlier.   no over night events.       ICU Vital Signs Last 24 Hrs  T(C): 35.9 (15 Oct 2019 07:00), Max: 36.9 (14 Oct 2019 19:00)  T(F): 96.7 (15 Oct 2019 07:00), Max: 98.5 (14 Oct 2019 19:00)  HR: 85 (15 Oct 2019 09:26) (71 - 91)  BP: 143/92 (15 Oct 2019 09:26) (116/72 - 180/95)  BP(mean): 112 (15 Oct 2019 09:26) (87 - 130)  ABP: --  ABP(mean): --  RR: 18 (15 Oct 2019 09:26) (11 - 40)  SpO2: 96% (15 Oct 2019 09:26) (92% - 98%)    I&O's Summary    14 Oct 2019 07:01  -  15 Oct 2019 07:00  --------------------------------------------------------  IN: 1837.5 mL / OUT: 3375 mL / NET: -1537.5 mL    15 Oct 2019 07:01  -  15 Oct 2019 09:51  --------------------------------------------------------  IN: 225 mL / OUT: 200 mL / NET: 25 mL          LABS:                        9.9    10.22 )-----------( 231      ( 15 Oct 2019 06:21 )             31.9     10-15    138  |  99  |  50<H>  ----------------------------<  70  4.1   |  24  |  4.3<HH>    Ca    7.4<L>      15 Oct 2019 06:21  Phos  7.8     10-14  Mg     1.7     10-14    TPro  5.2<L>  /  Alb  1.7<L>  /  TBili  <0.2  /  DBili  x   /  AST  14  /  ALT  10  /  AlkPhos  158<H>  10-15        CAPILLARY BLOOD GLUCOSE      POCT Blood Glucose.: 74 mg/dL (15 Oct 2019 06:28)  POCT Blood Glucose.: 101 mg/dL (15 Oct 2019 00:00)  POCT Blood Glucose.: 122 mg/dL (14 Oct 2019 16:59)        RADIOLOGY & ADDITIONAL TESTS:    Consultant(s) Notes Reviewed:  [x ] YES  [ ] NO    MEDICATIONS  (STANDING):  calcium carbonate    500 mG (Tums) Chewable 1 Tablet(s) Chew every 8 hours  cefepime   IVPB 1000 milliGRAM(s) IV Intermittent every 24 hours  cholecalciferol 1000 Unit(s) Oral daily  collagenase Ointment 1 Application(s) Topical daily  collagenase Ointment 1 Application(s) Topical daily  heparin  Injectable 5000 Unit(s) SubCutaneous every 12 hours  lactated ringers. 1000 milliLiter(s) (75 mL/Hr) IV Continuous <Continuous>  methadone    Tablet 80 milliGRAM(s) Oral four times a day  metroNIDAZOLE  IVPB 500 milliGRAM(s) IV Intermittent every 8 hours    MEDICATIONS  (PRN):  acetaminophen   Tablet .. 650 milliGRAM(s) Oral every 6 hours PRN Mild Pain (1 - 3)      PHYSICAL EXAM:  GENERAL: no Acute distress   HEAD:  Atraumatic, Normocephalic  EYES: EOMI, PERRLA, conjunctiva and sclera clear  ENT: No tonsillar erythema, exudates, or enlargement; Moist mucous membranes, Good dentition, No lesions  NECK: Supple, No JVD, Normal thyroid, no enlarged nodes  NERVOUS SYSTEM:  Alert & Oriented X3  CHEST/LUNG: B/L good air entry; No rales, rhonchi, or wheezing  HEART: S1S2 normal, no S3, Regular rate and rhythm; No murmurs, rubs, or gallops  ABDOMEN: Soft, Nontender, Nondistended; Bowel sounds present        Care Discussed with Consultants/Other Providers [ x] YES  [ ] NO

## 2019-10-15 NOTE — PROGRESS NOTE ADULT - SUBJECTIVE AND OBJECTIVE BOX
Nephrology progress note    THIS IS AN INCOMPLETE NOTE . FULL NOTE TO FOLLOW SHORTLY    Patient is seen and examined, events over the last 24 h noted .    Allergies:  sulfamethizole (Unknown)    Hospital Medications:   MEDICATIONS  (STANDING):  calcium carbonate    500 mG (Tums) Chewable 1 Tablet(s) Chew every 8 hours  cefepime   IVPB 1000 milliGRAM(s) IV Intermittent every 24 hours  cholecalciferol 1000 Unit(s) Oral daily  collagenase Ointment 1 Application(s) Topical daily  heparin  Injectable 5000 Unit(s) SubCutaneous every 12 hours  lactated ringers. 1000 milliLiter(s) (75 mL/Hr) IV Continuous <Continuous>  methadone    Tablet 80 milliGRAM(s) Oral four times a day  metroNIDAZOLE  IVPB 500 milliGRAM(s) IV Intermittent every 8 hours        VITALS:  T(F): 96.7 (10-15-19 @ 07:00), Max: 98.5 (10-14-19 @ 19:00)  HR: 78 (10-15-19 @ 07:09)  BP: 180/95 (10-15-19 @ 07:09)  RR: 16 (10-15-19 @ 07:09)  SpO2: 96% (10-15-19 @ 07:09)  Wt(kg): --    10-13 @ 07:01  -  10-14 @ 07:00  --------------------------------------------------------  IN: 883 mL / OUT: 1805 mL / NET: -922 mL    10-14 @ 07:01  -  10-15 @ 07:00  --------------------------------------------------------  IN: 1837.5 mL / OUT: 3375 mL / NET: -1537.5 mL    10-15 @ 07:01  -  10-15 @ 08:28  --------------------------------------------------------  IN: 75 mL / OUT: 200 mL / NET: -125 mL      14 Oct 2019 07:01  -  15 Oct 2019 07:00  --------------------------------------------------------  IN:    IV PiggyBack: 600 mL    lactated ringers.: 1237.5 mL  Total IN: 1837.5 mL    OUT:    Indwelling Catheter - Suprapubic: 3375 mL  Total OUT: 3375 mL    Total NET: -1537.5 mL      15 Oct 2019 07:01  -  15 Oct 2019 08:28  --------------------------------------------------------  IN:    lactated ringers.: 75 mL  Total IN: 75 mL    OUT:    Indwelling Catheter - Suprapubic: 200 mL  Total OUT: 200 mL    Total NET: -125 mL        Height (cm): 167.64 (10-14 @ 10:23)  Weight (kg): 68.6 (10-14 @ 10:23)  BMI (kg/m2): 24.4 (10-14 @ 10:23)  BSA (m2): 1.78 (10-14 @ 10:23)    PHYSICAL EXAM:  Constitutional: NAD  HEENT: anicteric sclera, oropharynx clear, MMM  Neck: No JVD  Respiratory: CTAB, no wheezes, rales or rhonchi  Cardiovascular: S1, S2, RRR  Gastrointestinal: BS+, soft, NT/ND  Extremities: No cyanosis or clubbing. No peripheral edema  :  No brown.   Skin: No rashes    LABS:  10-15    138  |  99  |  50<H>  ----------------------------<  70  4.1   |  24  |  4.3<HH>    Creatinine Trend: 4.3<--, 4.3<--, 4.6<--, 4.3<--, 4.8<--, 4.8<--  Ca    7.4<L>      15 Oct 2019 06:21  Phos  7.8     10-14  Mg     1.7     10-14    TPro  5.2<L>  /  Alb  1.7<L>  /  TBili  <0.2  /  DBili      /  AST  14  /  ALT  10  /  AlkPhos  158<H>  10-15                          9.9    10.22 )-----------( 231      ( 15 Oct 2019 06:21 )             31.9       Urine Studies:    Protein/Creatinine Ratio Calculation: 18.4 Ratio (10-14 @ 19:45)  Creatinine, Random Urine: 29 mg/dL (10-14 @ 19:45)  Osmolality, Random Urine: 336 mos/kg (10-14 @ 19:45)  Sodium, Random Urine: 96.0 mmoL/L (10-14 @ 19:45)    RADIOLOGY & ADDITIONAL STUDIES: Nephrology progress note  Patient is seen and examined, events over the last 24 h noted .  lying in bed comfortable   no SOB no chest pain     Allergies:  sulfamethizole (Unknown)    Hospital Medications:   MEDICATIONS  (STANDING):  calcium carbonate    500 mG (Tums) Chewable 1 Tablet(s) Chew every 8 hours  cefepime   IVPB 1000 milliGRAM(s) IV Intermittent every 24 hours  cholecalciferol 1000 Unit(s) Oral daily  collagenase Ointment 1 Application(s) Topical daily  heparin  Injectable 5000 Unit(s) SubCutaneous every 12 hours  lactated ringers. 1000 milliLiter(s) (75 mL/Hr) IV Continuous <Continuous>  methadone    Tablet 80 milliGRAM(s) Oral four times a day  metroNIDAZOLE  IVPB 500 milliGRAM(s) IV Intermittent every 8 hours        VITALS:  T(F): 96.7 (10-15-19 @ 07:00), Max: 98.5 (10-14-19 @ 19:00)  HR: 78 (10-15-19 @ 07:09)  BP: 180/95 (10-15-19 @ 07:09)  RR: 16 (10-15-19 @ 07:09)  SpO2: 96% (10-15-19 @ 07:09)      10-13 @ 07:01  -  10-14 @ 07:00  --------------------------------------------------------  IN: 883 mL / OUT: 1805 mL / NET: -922 mL    10-14 @ 07:01  -  10-15 @ 07:00  --------------------------------------------------------  IN: 1837.5 mL / OUT: 3375 mL / NET: -1537.5 mL    10-15 @ 07:01  -  10-15 @ 08:28  --------------------------------------------------------  IN: 75 mL / OUT: 200 mL / NET: -125 mL      14 Oct 2019 07:01  -  15 Oct 2019 07:00  --------------------------------------------------------  IN:    IV PiggyBack: 600 mL    lactated ringers.: 1237.5 mL  Total IN: 1837.5 mL    OUT:    Indwelling Catheter - Suprapubic: 3375 mL  Total OUT: 3375 mL    Total NET: -1537.5 mL      15 Oct 2019 07:01  -  15 Oct 2019 08:28  --------------------------------------------------------  IN:    lactated ringers.: 75 mL  Total IN: 75 mL    OUT:    Indwelling Catheter - Suprapubic: 200 mL  Total OUT: 200 mL    Total NET: -125 mL        Height (cm): 167.64 (10-14 @ 10:23)  Weight (kg): 68.6 (10-14 @ 10:23)  BMI (kg/m2): 24.4 (10-14 @ 10:23)  BSA (m2): 1.78 (10-14 @ 10:23)    PHYSICAL EXAM:  Constitutional: awake   HEENT: anicteric sclera, oropharynx clear, MMM  Neck: No JVD  Respiratory: CTAB, no wheezes, rales or rhonchi  Cardiovascular: S1, S2, RRR  Gastrointestinal: BS+, soft, NT/ND  Extremities: No cyanosis or clubbing. No peripheral edema/ right BKA   : positive SPC    Skin: No rashes    LABS:  10-15    138  |  99  |  50<H>  ----------------------------<  70  4.1   |  24  |  4.3<HH>    Creatinine Trend: 4.3<--, 4.3<--, 4.6<--, 4.3<--, 4.8<--, 4.8<--  Ca    7.4<L>      15 Oct 2019 06:21  Phos  7.8     10-14  Mg     1.7     10-14    Intact PTH: 209: PTH METHOD: Roche  Guide for Interpretation of PTH and Calcium Results                           Calcium             PTH                           MG/DL               PG/ML  Normal                   8.4-10.5            15-65  Primary  Hyperparathyroidism      >10.5               >50  Non-PTH Hypercalcemia    >10.5               0-20  Hypoparathyroidism       <8.4                0-20  Pseudohypoparathyroid    <8.4                >50  This is intended as a guide only. Factors such as sunlight exposure,  Vitamin D status and renal function should be evaluated along with  clinical presentation. pg/mL (10.14.19 @ 19:45)      TPro  5.2<L>  /  Alb  1.7<L>  /  TBili  <0.2  /  DBili      /  AST  14  /  ALT  10  /  AlkPhos  158<H>  10-15                          9.9    10.22 )-----------( 231      ( 15 Oct 2019 06:21 )             31.9       Urine Studies:    Protein/Creatinine Ratio Calculation: 18.4 Ratio (10-14 @ 19:45)  Creatinine, Random Urine: 29 mg/dL (10-14 @ 19:45)  Osmolality, Random Urine: 336 mos/kg (10-14 @ 19:45)  Sodium, Random Urine: 96.0 mmoL/L (10-14 @ 19:45)    RADIOLOGY & ADDITIONAL STUDIES:

## 2019-10-15 NOTE — PROGRESS NOTE ADULT - PROBLEM SELECTOR PLAN 1
s/p overdose- multiple episodes  No evidence of ongoing sepsis    followed up by burn surgery - chronic wounds   There is NO chance of Cure unless debridement with flap / wound cover   Abx are OF NO benefit here   DC all abx and observe  Awake and alert to maintain airways     Incr risk of overdoses  Recall if needed

## 2019-10-15 NOTE — PROGRESS NOTE ADULT - SUBJECTIVE AND OBJECTIVE BOX
PROGRESS NOTE   Pt seen @ bedside during AM rounds w/ Attending. Dressing +Clean, +Dry, +Intact. Pt. denies any recent n/f/v/c/sob. Pt. denies any other pedal complaints at this time.      Vital Signs Last 24 Hrs  T(C): 35.9 (15 Oct 2019 03:01), Max: 36.9 (14 Oct 2019 19:00)  T(F): 96.6 (15 Oct 2019 03:01), Max: 98.5 (14 Oct 2019 19:00)  HR: 85 (15 Oct 2019 00:03) (80 - 91)  BP: 130/75 (15 Oct 2019 00:03) (116/72 - 160/74)  BP(mean): 97 (15 Oct 2019 00:03) (87 - 108)  RR: 15 (15 Oct 2019 05:01) (15 - 40)  SpO2: 94% (15 Oct 2019 05:01) (93% - 98%)                          9.9    10.22 )-----------( 231      ( 15 Oct 2019 06:21 )             31.9               10-14    136  |  94<L>  |  54<H>  ----------------------------<  122<H>  4.0   |  26  |  4.3<HH>    Ca    6.7<L>      14 Oct 2019 06:14  Phos  7.8     10-14  Mg     1.7     10-14        PHYSICAL EXAM  Left Lower Extremity Focused:   Derm:   Open Surgical Wound from the Plantar Heel Extending to the Posterior aspect   Fibrogranular Tissue noted at the wound base . Wound base measurements (8cm x 6cm) No malodor. No drainage.  Eschar noted at the lateral and proximal aspect of the wound base.     Vascular:   DP and PT Pulses diminished. Skin temperature is warm to cool from proximal to distal. Capillary refill time is > 3 seconds.      Neuro:  Protective sensation moderately diminished.        Assessment:  s/p AKA Right   s/p Calcanectomy Left Foot   Neuropathic    Plan:  Pt. seen and evaluated.  Discussed treatment and condition w/ patient.  A-duplex pending report  Non-excisional debridement of fibrotic tissue with #15 blade at the dermal level w/o incident  Wound cleansed with normal saline. Application of santyl/dsd/ABD/kerlix  Wound Care Instructions: As stated above  Heel Offloaders order still pending  Podiatry will cont. to follow while in house. HPI:  58yM Pmhx of PVD s/p R AKA, paraplegia with multiple chronic bedsores and suprapubic catheter presents for an episode of unresponsiveness witnessed by family members. En route to the ER found to be hypoglycemic, given IV dextrose. In the ER found to be acidotic and continuously unresponsive , even with administration of narcan. Of note, he was recently admitted last month for a similar episode of unresponsiveness which responded to flumazenil and narcan. He revealed at that time that he had taken three xanax of 2 mg. He sees Dr. Gooden for his chronic decubiti. central line was placed by ed team and he was intubated for airway protection and hypercapnia (11 Oct 2019 22:57)      Past Medical History and Surgical History  PAST MEDICAL & SURGICAL HISTORY:  Anemia  PAD (peripheral artery disease)  Hypertension  Suprapubic catheter  Pressure ulcer  Diabetes  Lumbar compression fracture  S/P below knee amputation, right  S/P debridement  Toe amputation status, right  H/O hernia repair       Review of Systems:   Constitutional: No weakness, fevers or chills  Eyes / ENT: No visual changes; No vertigo or throat pain   Neck: No pain or stiffness  Respiratory: No cough, wheezing, hemoptysis; No shortness of breath  Cardiovascular: No chest pain or palpitations  Gastrointestinal: No abdominal or epigastric pain. No nausea, vomiting, or hematemesis; No diarrhea or constipation. No melena or hematochezia.  Genitourinary: No dysuria, frequency or hematuria  Neurological: No numbness or weakness  Skin: s/p Calcanectomy Left Foot - Surgical Wound Present         Vital Signs Last 24 Hrs  T(C): 35.9 (15 Oct 2019 03:01), Max: 36.9 (14 Oct 2019 19:00)  T(F): 96.6 (15 Oct 2019 03:01), Max: 98.5 (14 Oct 2019 19:00)  HR: 85 (15 Oct 2019 00:03) (80 - 91)  BP: 130/75 (15 Oct 2019 00:03) (116/72 - 160/74)  BP(mean): 97 (15 Oct 2019 00:03) (87 - 108)  RR: 15 (15 Oct 2019 05:01) (15 - 40)  SpO2: 94% (15 Oct 2019 05:01) (93% - 98%)                          9.9    10.22 )-----------( 231      ( 15 Oct 2019 06:21 )             31.9               10-14    136  |  94<L>  |  54<H>  ----------------------------<  122<H>  4.0   |  26  |  4.3<HH>    Ca    6.7<L>      14 Oct 2019 06:14  Phos  7.8     10-14  Mg     1.7     10-14        PHYSICAL EXAM  Left Lower Extremity Focused:   Derm:   Open Surgical Wound from the Plantar Heel Extending to the Posterior aspect   Fibrogranular Tissue noted at the wound base . Wound base measurements (8cm x 6cm) No malodor. No drainage.  Eschar noted at the lateral and proximal aspect of the wound base.     Vascular:   DP and PT Pulses diminished. Skin temperature is warm to cool from proximal to distal. Capillary refill time is > 3 seconds.            Assessment:  s/p AKA Right   s/p Calcanectomy Left Foot   Neuropathic    Plan:  Pt. seen and evaluated.  Discussed treatment and condition w/ patient.  A-duplex pending report  excisional debridement of fibrotic tissue with #15 blade down to subcutaneous tissue   Wound cleansed with normal saline. Application of santyl/dsd/ABD/kerlix  Wound Care Instructions: As stated above  [employer] Heel Offloader boots  order still pending  Podiatry will cont. to follow while in house.

## 2019-10-15 NOTE — PROGRESS NOTE ADULT - SUBJECTIVE AND OBJECTIVE BOX
MARGE BELLA  58y, Male  Allergy: sulfamethizole (Unknown)      CHIEF COMPLAINT: unresponsive at home (14 Oct 2019 23:37)      INTERVAL EVENTS/HPI  - No acute events overnight  - T(F): , Max: 98.5 (10-14-19 @ 19:00)  - Denies any worsening symptoms  - Tolerating medication    ROS  10 system review- neg     SOCIAL HISTORY    Substance Use (  ) never used  (  ) IVDU X(  ) Other:  Tobacco Usage:  (   ) never smoked   (X   ) former smoker   (   ) current smoker   Alcohol Usage: (   ) social  (   ) daily use (   ) denies  Sexual History: not relevant       FH noncontributory     VITALS:  T(F): 96.6, Max: 98.5 (10-14-19 @ 19:00)  HR: 85  BP: 130/75  RR: 15Vital Signs Last 24 Hrs  T(C): 35.9 (15 Oct 2019 03:01), Max: 36.9 (14 Oct 2019 19:00)  T(F): 96.6 (15 Oct 2019 03:01), Max: 98.5 (14 Oct 2019 19:00)  HR: 85 (15 Oct 2019 00:03) (78 - 91)  BP: 130/75 (15 Oct 2019 00:03) (116/72 - 160/74)  BP(mean): 97 (15 Oct 2019 00:03) (87 - 108)  RR: 15 (15 Oct 2019 05:01) (15 - 40)  SpO2: 94% (15 Oct 2019 05:01) (93% - 98%)    PHYSICAL EXAM:  Gen: NAD, resting in bed  HEENT: Normocephalic, atraumatic  Neck: supple, no lymphadenopathy  CV: s1 s 2 +   Lungs: clear   Abdomen: Soft, BS present  Ext: Left foot wound - clean. no drainage   Neuro: non focal, awake  Skin: no rash, no erythema      TESTS & MEASUREMENTS:                        9.3    9.06  )-----------( 243      ( 14 Oct 2019 06:14 )             29.1     10-14    136  |  94<L>  |  54<H>  ----------------------------<  122<H>  4.0   |  26  |  4.3<HH>    Ca    6.7<L>      14 Oct 2019 06:14  Phos  7.8     10-14  Mg     1.7     10-14      eGFR if Non African American: 14 mL/min/1.73M2 (10-14-19 @ 06:14)  eGFR if African American: 17 mL/min/1.73M2 (10-14-19 @ 06:14)          Culture - Other (collected 10-12-19 @ 18:55)  Source: .Other L heel wound  Preliminary Report (10-14-19 @ 21:39):    Few Escherichia coli    Few Enterococcus species "Susceptibilities not performed"    Rare Staphylococcus aureus  Organism: Escherichia coli (10-14-19 @ 19:07)  Organism: Escherichia coli (10-14-19 @ 19:07)      -  Amikacin: S <=16      -  Amoxicillin/Clavulanic Acid: I 16/8      -  Ampicillin: R >16 These ampicillin results predict results for amoxicillin      -  Ampicillin/Sulbactam: R >16/8 Enterobacter, Citrobacter, and Serratia may develop resistance during prolonged therapy (3-4 days)      -  Aztreonam: S <=4      -  Cefazolin: S 16 Enterobacter, Citrobacter, and Serratia may develop resistance during prolonged therapy (3-4 days)      -  Cefepime: S <=2      -  Cefoxitin: S <=8      -  Ceftriaxone: S <=1 Enterobacter, Citrobacter, and Serratia may develop resistance during prolonged therapy      -  Ciprofloxacin: R >2      -  Ertapenem: S <=0.5      -  Gentamicin: R >8      -  Imipenem: S <=1      -  Levofloxacin: R >4      -  Meropenem: S <=1      -  Piperacillin/Tazobactam: I 32      -  Tobramycin: I 8      -  Trimethoprim/Sulfamethoxazole: R >2/38      Method Type: ALLISON    Culture - Blood (collected 10-12-19 @ 05:41)  Source: .Blood None  Preliminary Report (10-13-19 @ 18:00):    No growth to date.    Culture - Blood (collected 10-11-19 @ 22:00)  Source: .Blood Blood-Peripheral  Preliminary Report (10-13-19 @ 18:00):    No growth to date.    Culture - Blood (collected 10-11-19 @ 22:00)  Source: .Blood Blood-Peripheral  Preliminary Report (10-13-19 @ 18:00):    No growth to date.        Blood Gas Venous - Lactate: 0.8 mmoL/L (10-11-19 @ 21:30)  Blood Gas Venous - Lactate: 0.6 mmoL/L (10-11-19 @ 18:45)      INFECTIOUS DISEASES TESTING  Hepatitis C Virus Interpretation: Nonreact (07-06-19 @ 07:13)  Hepatitis B Surface Antigen: Nonreact (07-06-19 @ 07:13)  Hepatitis C Virus Interpretation: Nonreact (06-04-19 @ 05:01)      RADIOLOGY & ADDITIONAL TESTS:      CARDIOLOGY TESTING  12 Lead ECG:   Ventricular Rate 71 BPM    Atrial Rate 71 BPM    P-R Interval 192 ms    QRS Duration 84 ms    Q-T Interval 484 ms    QTC Calculation(Bezet) 525 ms    P Axis 24 degrees    R Axis -19 degrees    T Axis 49 degrees    Diagnosis Line Sinus rhythm with Premature atrial complexes with Aberrant conduction  Low voltage QRS  Prolonged QT  Nonspecific ST and T wave abnormality  Abnormal ECG    Confirmed by AMMON TORREZ MD ([id number]) on 10/12/2019 1:23:14 PM (10-12-19 @ 07:32)  12 Lead ECG:   Ventricular Rate 72 BPM    Atrial Rate 72 BPM    P-R Interval 168 ms    QRS Duration 106 ms    Q-T Interval 440 ms    QTC Calculation(Bezet) 481 ms    P Axis 13 degrees    R Axis -34 degrees    T Axis 58 degrees    Diagnosis Line Normal sinus rhythm  Left axis deviation  Prolonged QT  Poor R wave progression  Abnormal ECG    Confirmed by AMMON TORREZ MD [id number]) on 10/12/2019 1:29:00 PM (10-11-19 @ 19:03)      MEDICATIONS  calcium carbonate    500 mG (Tums) Chewable 1  cefepime   IVPB 1000  cholecalciferol 1000  collagenase Ointment 1  heparin  Injectable 5000  lactated ringers. 1000  methadone    Tablet 80  metroNIDAZOLE  IVPB 500      ANTIBIOTICS:  cefepime   IVPB 1000 milliGRAM(s) IV Intermittent every 24 hours  metroNIDAZOLE  IVPB 500 milliGRAM(s) IV Intermittent every 8 hours

## 2019-10-15 NOTE — PROGRESS NOTE ADULT - SUBJECTIVE AND OBJECTIVE BOX
Patient is a 58y old  Male who presents with a chief complaint of unresponsive at home (15 Oct 2019 09:50)        Interval Events: No overnight events.    REVIEW OF SYSTEMS:  Constitutional: No fevers or chills. No weight loss. No fatigue or generalized malaise.  Eyes: No itching or discharge from the eyes  ENT: No ear pain. No ear discharge. No nasal congestion. No post nasal drip. No epistaxis. No throat pain. No sore throat. No difficulty swallowing.   CV: No chest pain. No palpitations. No lightheadedness or dizziness.   Resp: No dyspnea at rest. No dyspnea on exertion. No orthopnea. No wheezing. No cough. No stridor. No sputum production. No chest pain with respiration.  GI: No nausea. No vomiting. No diarrhea.  MSK: No joint pain or pain in any extremities  Integumentary: No skin lesions. No pedal edema.  Neurological: No gross motor weakness. No sensory changes.      OBJECTIVE:  ICU Vital Signs Last 24 Hrs  T(C): 35.9 (15 Oct 2019 07:00), Max: 36.9 (14 Oct 2019 19:00)  T(F): 96.7 (15 Oct 2019 07:00), Max: 98.5 (14 Oct 2019 19:00)  HR: 85 (15 Oct 2019 09:26) (71 - 91)  BP: 143/92 (15 Oct 2019 09:26) (116/72 - 180/95)  BP(mean): 112 (15 Oct 2019 09:26) (87 - 130)  RR: 18 (15 Oct 2019 09:26) (11 - 40)  SpO2: 96% (15 Oct 2019 09:26) (92% - 98%)        10-14 @ 07:01  -  10-15 @ 07:00  --------------------------------------------------------  IN: 1837.5 mL / OUT: 3375 mL / NET: -1537.5 mL    10-15 @ 07:01  -  10-15 @ 10:19  --------------------------------------------------------  IN: 225 mL / OUT: 200 mL / NET: 25 mL      CAPILLARY BLOOD GLUCOSE      POCT Blood Glucose.: 74 mg/dL (15 Oct 2019 06:28)      PHYSICAL EXAM:  General: Awake, alert, oriented X 3.   HEENT: Atraumatic, normocephalic.                 Mallampatti Grade                 No nasal congestion.                No tonsillar or pharyngeal exudates.  Lymph Nodes: No palpable lymphadenopathy neck, supraclavicular region  Neck: No JVD. No carotid bruit, no thyromegally  Respiratory: Normal chest expansion                         Normal percussion                         Normal and equal air entry                         No wheeze, rhonchi or rales.  Cardiovascular: S1 S2 normal. No murmurs, rubs or gallops.   Abdomen: Soft, non-tender, non-distended. No organomegaly.  Extremities: Warm to touch. Peripheral pulse palpable. No pedal edema.   Skin: No rashes or skin lesions, warm dry  Neurological: Motor and sensory examination equal and normal in all four extremities.  Psychiatry: Appropriate mood and affect.    HOSPITAL MEDICATIONS:  MEDICATIONS  (STANDING):  calcium carbonate    500 mG (Tums) Chewable 1 Tablet(s) Chew every 8 hours  cefepime   IVPB 1000 milliGRAM(s) IV Intermittent every 24 hours  cholecalciferol 1000 Unit(s) Oral daily  collagenase Ointment 1 Application(s) Topical daily  collagenase Ointment 1 Application(s) Topical daily  heparin  Injectable 5000 Unit(s) SubCutaneous every 12 hours  lactated ringers. 1000 milliLiter(s) (75 mL/Hr) IV Continuous <Continuous>  methadone    Tablet 80 milliGRAM(s) Oral four times a day  metroNIDAZOLE  IVPB 500 milliGRAM(s) IV Intermittent every 8 hours    MEDICATIONS  (PRN):  acetaminophen   Tablet .. 650 milliGRAM(s) Oral every 6 hours PRN Mild Pain (1 - 3)      LABS:                        9.9    10.22 )-----------( 231      ( 15 Oct 2019 06:21 )             31.9     10-15    138  |  99  |  50<H>  ----------------------------<  70  4.1   |  24  |  4.3<HH>    Ca    7.4<L>      15 Oct 2019 06:21  Phos  7.8     10-14  Mg     1.7     10-14    TPro  5.2<L>  /  Alb  1.7<L>  /  TBili  <0.2  /  DBili  x   /  AST  14  /  ALT  10  /  AlkPhos  158<H>  10-15                      RADIOLOGY:Radiology personally reviewed.

## 2019-10-15 NOTE — PROGRESS NOTE ADULT - ASSESSMENT
IMPRESSION:  alter mental status secondary to drug over dose opoid   SILVINA on CKD not improving  metabolic acidosis improved  anemia stable     PLAN:    CNS: no sedating medications    HEENT: oral care     PULMONARY: pulmonary toilet    CARDIOVASCULAR: echo,     GI: GI prophylaxis.  diet    RENAL:   follow renal lytes  renal f/u an recommendations      INFECTIOUS DISEASE: pan cx follow ID , d/c vanco       HEMATOLOGICAL:  DVT prophylaxis.  check substrates    ENDOCRINE:  Follow up FS.  Insulin protocol if needed.        can go to F today

## 2019-10-15 NOTE — PROGRESS NOTE ADULT - ASSESSMENT
- SILVINA  Hemodynamic changes  Volume depletion / decrease intake  - CKD  stage 4 baseline creat 3.3 in July 2019  - Hyperkalemia, corrected  - Metabolic acidosis, improved with treatment  Severe anemia - 2nd to CKD, R/O iron deficiency and other causes  Acute resp failure  Hypocalcemia - corrected Ca ~8.7  -Altered mental status    Metabolic encephalopathy, Hypercapnia, also suspected intoxication with CNS affecting medications:       - High dose Gabapentin        - Possible benzodiazepines and narcotics  Severe PAD -  s/p Rt BKA     Sacral and multiple leg/Lt foot ulcers  Sepsis  Renal plan:  Monitor and stabilize vitals  Avoid hypotension  - Continue IV fluids, bicarb normalizing at 23, could change IV fluids to NS or LR if bicarb higher  - check 25 Vit D and iPTH, may give Ca carbonate 500 mg po q8 hrs    U/Prot/Cr  - Renal/bladder sonogram  - Monitor urine out put  - Iron studies    Stool for OB    Transfuse PRBC as needed  - If PRBC not given, when iron studies available, could consider LINDY, Epogen [id number] units QW  Antibiotics as per ID - on Cefipime now    Wound care   Will f/u - SILVINA on CKD 4 baseline 3.3  Hemodynamic changes  Volume depletion / decrease intake  - Hyperkalemia, corrected  - Metabolic acidosis, improved with treatment  Severe anemia - 2nd to CKD, R/O iron deficiency and other causes  Acute resp failure  Hypocalcemia - corrected Ca ~9  Severe PAD -  s/p Rt BKA     Sacral and multiple leg/Lt foot ulcers  Sepsis  Renal plan:  Monitor and stabilize vitals  Avoid hypotension  - Continue IVF LR current   - PTH noted c/w CKD 4  may give Ca carbonate 500 mg po q8 hrs cont vit D   - Monitor urine out put  - Iron studies    Stool for OB    Transfuse PRBC as needed  - If PRBC not given, when iron studies available, could consider LINDY, Epogen 83054 units QW  Antibiotics as per ID - on Cefepime / metronidazole now  HTN can use CCB or labetalol     Wound care   Will f/u

## 2019-10-16 NOTE — PROGRESS NOTE ADULT - ASSESSMENT
SILVINA on CKD 4 baseline 3.3   - due to Volume depletion / decrease intake  - polyuric 4 L now, resolving SILVINA, creat down to 4.0  - cont LR 75 cc/hr - encourage po intake  Severe anemia - 2nd to CKD, R/O iron deficiency and other causes    Vit D deficiency - profound, 25 - vit D - undetectable <5, with iPTH 200, hypocalcemia  - start Ergocalciferol 50,000 po 2x week,     Proteinuria 18 gr as per SPC - doubt it is real  please obtain UA and spot prot/creat ratio    Malnutrution - Alb 1.7 - needs nutrition support, supplements   calorie counts etc    Severe PAD -  s/p Rt BKA     Sacral and multiple leg/Lt foot ulcers  Sepsis  Anemia - Iron studies    Stool for OB    Transfuse PRBC as needed  - If PRBC not given, when iron studies available, could consider LINDY, Epogen 65965 units QW  Antibiotics as per ID - on Cefepime / metronidazole now  HTN can use CCB or labetalol     Wound care   Will f/u

## 2019-10-16 NOTE — PROGRESS NOTE ADULT - SUBJECTIVE AND OBJECTIVE BOX
MARGE BELLA  58y  Male      Patient is a 58y old Male who presents with a chief complaint of unresponsive at home (16 Oct 2019 10:00)      INTERVAL HPI/OVERNIGHT EVENTS:  Patient seen and examined earlier this morning.  Lying comfortably in bed.  In NAD. No complaints      REVIEW OF SYSTEMS:  CONSTITUTIONAL: No fever, weight loss, or fatigue  EYES: No eye pain, visual disturbances, or discharge  ENMT:  No difficulty hearing, tinnitus, vertigo; No sinus or throat pain  NECK: No pain or stiffness  RESPIRATORY: No cough, wheezing, chills or hemoptysis; No shortness of breath  CARDIOVASCULAR: No chest pain, palpitations, dizziness, or leg swelling  GASTROINTESTINAL: No abdominal or epigastric pain. No nausea, vomiting, or hematemesis; No diarrhea or constipation. No melena or hematochezia.  GENITOURINARY: No dysuria, frequency, hematuria, or incontinence  NEUROLOGICAL: No headaches, memory loss, loss of strength, numbness, or tremors  SKIN: No itching, burning, rashes, or lesions   LYMPH NODES: No enlarged glands  ENDOCRINE: No heat or cold intolerance; No hair loss  MUSCULOSKELETAL: No joint pain or swelling; No muscle, back, or extremity pain  PSYCHIATRIC: No depression, anxiety, mood swings, or difficulty sleeping  HEME/LYMPH: No easy bruising, or bleeding gums  ALLERY AND IMMUNOLOGIC: No hives or eczema    T(C): 36.5 (10-16-19 @ 06:28), Max: 36.5 (10-16-19 @ 06:28)  HR: 82 (10-16-19 @ 06:28) (75 - 85)  BP: 144/88 (10-16-19 @ 06:28) (142/82 - 161/84)  RR: 18 (10-16-19 @ 06:28) (18 - 18)  SpO2: --    PHYSICAL EXAM:  GENERAL: NAD, well-groomed, well-developed  HEAD:  Atraumatic, Normocephalic  EYES: EOMI, PERRLA, conjunctiva and sclera clear  ENMT: No tonsillar erythema, exudates, or enlargement; Moist mucous membranes, Good dentition, No lesions  NECK: Supple, No JVD, Normal thyroid  NERVOUS SYSTEM:  Alert & Oriented X3, Good concentration; Motor Strength 5/5 B/L upper and lower extremities; DTRs 2+ intact and symmetric  CHEST/LUNG: Clear to percussion bilaterally; No rales, rhonchi, wheezing, or rubs  HEART: Regular rate and rhythm; No murmurs, rubs, or gallops  ABDOMEN: Soft, Nontender, Nondistended; Bowel sounds present, suprapubic catheter,   EXTREMITIES:  2+ Peripheral Pulses, No clubbing, cyanosis, or edema, right BKA  LYMPH: No lymphadenopathy noted  SKIN: No rashes or lesions    Consultant(s) Notes Reviewed:  [x ] YES  [ ] NO  Care Discussed with Consultants/Other Providers [ x] YES  [ ] NO    LAB:                        9.9    9.53  )-----------( 228      ( 16 Oct 2019 07:18 )             32.2     10-16    141  |  104  |  48<H>  ----------------------------<  75  4.0   |  22  |  4.0<H>    Ca    7.5<L>      16 Oct 2019 07:18    TPro  5.1<L>  /  Alb  1.8<L>  /  TBili  <0.2  /  DBili  x   /  AST  12  /  ALT  9   /  AlkPhos  156<H>  10-16    LIVER FUNCTIONS - ( 16 Oct 2019 07:18 )  Alb: 1.8 g/dL / Pro: 5.1 g/dL / ALK PHOS: 156 U/L / ALT: 9 U/L / AST: 12 U/L / GGT: x             Drug Dosing Weight  Height (cm): 167.64 (15 Oct 2019 09:50)  Weight (kg): 68.6 (15 Oct 2019 09:50)  BMI (kg/m2): 24.4 (15 Oct 2019 09:50)  BSA (m2): 1.78 (15 Oct 2019 09:50)    CAPILLARY BLOOD GLUCOSE      POCT Blood Glucose.: 72 mg/dL (16 Oct 2019 11:04)  POCT Blood Glucose.: 78 mg/dL (15 Oct 2019 22:34)  POCT Blood Glucose.: 75 mg/dL (15 Oct 2019 16:21)    I&O's Summary    15 Oct 2019 07:01  -  16 Oct 2019 07:00  --------------------------------------------------------  IN: 1375 mL / OUT: 4050 mL / NET: -2675 mL        RADIOLOGY & ADDITIONAL TESTS:  Imaging Personally Reviewed:  [x] YES  [ ] NO        MEDS:  acetaminophen   Tablet .. 650 milliGRAM(s) Oral every 6 hours PRN  calcium carbonate    500 mG (Tums) Chewable 1 Tablet(s) Chew every 8 hours  cefepime   IVPB 1000 milliGRAM(s) IV Intermittent every 24 hours  cholecalciferol 1000 Unit(s) Oral daily  collagenase Ointment 1 Application(s) Topical daily  collagenase Ointment 1 Application(s) Topical daily  heparin  Injectable 5000 Unit(s) SubCutaneous every 12 hours  lactated ringers. 1000 milliLiter(s) IV Continuous <Continuous>  methadone    Tablet 80 milliGRAM(s) Oral four times a day  metroNIDAZOLE  IVPB 500 milliGRAM(s) IV Intermittent every 8 hours

## 2019-10-16 NOTE — PROGRESS NOTE ADULT - ASSESSMENT
Patient is a 58yM Pmhx of PVD s/p R AKA, paraplegia with multiple chronic bedsores and chronic suprapubic catheter presented 4 days ago for an episode of unresponsiveness witnessed by family members. En route to the ER was found to be hypoglycemic, received  IV dextrose. In the ER found to be acidotic and continuously unresponsive , even with administration of narcan. and he was intubated for airway protection and hypercapnia. Patient was managed in MICU and was extubated successfully 10/13 and since continued to improve. While in ICU goals of care discussion was held twice he initially wished to be DNR, but rescinded DNR a day later in ICU. Follow up nephrology recommendation and start discharge planning based on that.       1) Right Lower Lobe Infiltrate Likely representing Pneumonitis suspect aspiration pneumonitis  s/p extubation  not requiring oxygen    2) SILVINA likely pre-renal on CKD IV (baseline 3.3)  Follow with Nephrology on future plans for dialysis   Patient not in fluid overload at this time and currently making urine.   Hold all Nephro Toxic Drugs   Renal Ultrasound Pending     3) Severe anemia - 2nd to CKD  -R/O iron deficiency and other causes    4) Vit D deficiency - profound, 25 - vit D - undetectable <5, with iPTH 200, hypocalcemia  - started on supplement    5) Proteinuria  -nephrology following    6) Malnutrition - Alb 1.7   - needs nutrition support  -encourage oral hydration     7) Severe PAD -  s/p Rt BKA/Sacral and multiple leg/Lt foot ulcers    8) HTN   -well controlled on current tx    9) Toxic encephalopathy  Likely secondary to Poly Pharmacy and Medication non compliance  Gabapentin along with several other sedatives held.  Mental status much improved     10) Persistent Chronic Pain   Patient placed back on Methadone Home dose of 80 mg four times daily.   Dose verified with [facility] at 10:37 am with Shan on 10/14/19    11) Decubitus ulcers present on admission/ Functional quad/ PVD s/p Right BKA  -ID follow up pending - on cefepime/flagyl  - wound culture - E. Coli, Enterococcus, MRSA  -continue Isolation        DVT Prophylaxis   Heparin Sub Q  uses a wheelchair to get around        Progress Note Handoff  Pending Consults: ID  Pending Tests: labs  Pending Results: labs  Family Discussion: discussed with pt - in agreement   Disposition: Home_____/SNF______/Other_____/Unknown at this time_____    Please call me with any questions at [id number] [phone]

## 2019-10-16 NOTE — CHART NOTE - NSCHARTNOTEFT_GEN_A_CORE
Called by lab for positive Left heel wound culture on 10/12 that show few E.coli, few enterocolitis and rare MRSA. Pt will be isolated. As per ID recs from today, they discontinued all ABXs because sepsis has resolved. Will place ID consult for further recs.

## 2019-10-16 NOTE — PROGRESS NOTE ADULT - SUBJECTIVE AND OBJECTIVE BOX
Patient is seen and examined at the bed side, is afebrile.      REVIEW OF SYSTEMS: All other review systems are negative      Vital Signs Last 24 Hrs  T(C): 37.1 (16 Oct 2019 14:04), Max: 37.1 (16 Oct 2019 14:04)  T(F): 98.7 (16 Oct 2019 14:04), Max: 98.7 (16 Oct 2019 14:04)  HR: 78 (16 Oct 2019 18:18) (78 - 85)  BP: 136/73 (16 Oct 2019 18:18) (136/73 - 145/90)  BP(mean): --  RR: 18 (16 Oct 2019 18:18) (18 - 18)  SpO2: 96% (16 Oct 2019 18:18) (96% - 96%)      PHYSICAL EXAM:  GENERAL: Not in distress  CHEST/LUNG: Air entry bilaterally  HEART: s1 and s2 present   ABDOMEN: Nontender and Nondistended  EXTREMITIES: Left heel bandage  in placed  SKIN: Necrosis area at left heel and toes        MEDICATIONS  (STANDING):  calcium carbonate    500 mG (Tums) Chewable 1 Tablet(s) Chew every 8 hours  cefepime   IVPB 1000 milliGRAM(s) IV Intermittent every 24 hours  cholecalciferol 1000 Unit(s) Oral daily  collagenase Ointment 1 Application(s) Topical daily  collagenase Ointment 1 Application(s) Topical daily  heparin  Injectable 5000 Unit(s) SubCutaneous every 12 hours  lactated ringers. 1000 milliLiter(s) (75 mL/Hr) IV Continuous <Continuous>  methadone    Tablet 80 milliGRAM(s) Oral four times a day  metroNIDAZOLE  IVPB 500 milliGRAM(s) IV Intermittent every 8 hours    MEDICATIONS  (PRN):  acetaminophen   Tablet .. 650 milliGRAM(s) Oral every 6 hours PRN Mild Pain (1 - 3)          Allergies    sulfamethizole (Unknown)          LABS:                        9.9    9.53  )-----------( 228      ( 16 Oct 2019 07:18 )             32.2                           9.3    9.06  )-----------( 243      ( 14 Oct 2019 06:14 )             29.1       10-16    141  |  104  |  48<H>  ----------------------------<  75  4.0   |  22  |  4.0<H>    Ca    7.5<L>      16 Oct 2019 07:18    TPro  5.1<L>  /  Alb  1.8<L>  /  TBili  <0.2  /  DBili  x   /  AST  12  /  ALT  9   /  AlkPhos  156<H>  10-16    10-14    136  |  94<L>  |  54<H>  ----------------------------<  122<H>  4.0   |  26  |  4.3<HH>    Ca    6.7<L>      14 Oct 2019 06:14  Phos  7.8     10-14  Mg     1.7     10-14          CAPILLARY BLOOD GLUCOSE  POCT Blood Glucose.: 122 mg/dL (14 Oct 2019 16:59)  POCT Blood Glucose.: 107 mg/dL (14 Oct 2019 06:03)  POCT Blood Glucose.: 88 mg/dL (14 Oct 2019 01:54)      RADIOLOGY & ADDITIONAL TESTS:    < from: Xray Chest 1 View- PORTABLE-Routine (10.14.19 @ 05:35) >    Stable right basilar opacity.        MICROBIOLOGY DATA:        Culture - Other (10.12.19 @ 18:55)    -  Trimethoprim/Sulfamethoxazole: R >2/38    -  Cefoxitin: S <=8    -  Ciprofloxacin: R >2    -  Ertapenem: S <=0.5    -  Gentamicin: R >8    -  Levofloxacin: R >4    -  Imipenem: S <=1    -  Meropenem: S <=1    -  Piperacillin/Tazobactam: I 32    -  Tobramycin: I 8    -  Amikacin: S <=16    -  Ampicillin: R >16 These ampicillin results predict results for amoxicillin    -  Ampicillin/Sulbactam: R >16/8 Enterobacter, Citrobacter, and Serratia may develop resistance during prolonged therapy (3-4 days)    -  Aztreonam: S <=4    -  Amoxicillin/Clavulanic Acid: I 16/8    -  Cefazolin: S 16 Enterobacter, Citrobacter, and Serratia may develop resistance during prolonged therapy (3-4 days)    -  Cefepime: S <=2    -  Ceftriaxone: S <=1 Enterobacter, Citrobacter, and Serratia may develop resistance during prolonged therapy    Specimen Source: .Other L heel wound    Culture Results:   Few Escherichia coli  Few Enterococcus species "Susceptibilities not performed"  Rare Staphylococcus aureus    Organism Identification: Escherichia coli    Organism: Escherichia coli    Method Type: ALLISON      Culture - Blood (10.12.19 @ 05:41)    Specimen Source: .Blood None    Culture Results:   No growth to date. Patient is seen and examined at the bed side, is afebrile. He has transferred out of CCU. The Left heel culture grew E.coli and MRSA.      REVIEW OF SYSTEMS: All other review systems are negative      Vital Signs Last 24 Hrs  T(C): 37.1 (16 Oct 2019 14:04), Max: 37.1 (16 Oct 2019 14:04)  T(F): 98.7 (16 Oct 2019 14:04), Max: 98.7 (16 Oct 2019 14:04)  HR: 78 (16 Oct 2019 18:18) (78 - 85)  BP: 136/73 (16 Oct 2019 18:18) (136/73 - 145/90)  BP(mean): --  RR: 18 (16 Oct 2019 18:18) (18 - 18)  SpO2: 96% (16 Oct 2019 18:18) (96% - 96%)      PHYSICAL EXAM:  GENERAL: Not in distress  CHEST/LUNG: Air entry bilaterally  HEART: s1 and s2 present   ABDOMEN: Nontender and Nondistended  EXTREMITIES: Left heel bandage  in placed  SKIN: Necrosis area at left heel and toes  CNS: Awake and Alert        MEDICATIONS  (STANDING):  calcium carbonate    500 mG (Tums) Chewable 1 Tablet(s) Chew every 8 hours  cefepime   IVPB 1000 milliGRAM(s) IV Intermittent every 24 hours  cholecalciferol 1000 Unit(s) Oral daily  collagenase Ointment 1 Application(s) Topical daily  collagenase Ointment 1 Application(s) Topical daily  heparin  Injectable 5000 Unit(s) SubCutaneous every 12 hours  lactated ringers. 1000 milliLiter(s) (75 mL/Hr) IV Continuous <Continuous>  methadone    Tablet 80 milliGRAM(s) Oral four times a day  metroNIDAZOLE  IVPB 500 milliGRAM(s) IV Intermittent every 8 hours    MEDICATIONS  (PRN):  acetaminophen   Tablet .. 650 milliGRAM(s) Oral every 6 hours PRN Mild Pain (1 - 3)          Allergies    sulfamethizole (Unknown)          LABS:                        9.9    9.53  )-----------( 228      ( 16 Oct 2019 07:18 )             32.2                           9.3    9.06  )-----------( 243      ( 14 Oct 2019 06:14 )             29.1       10-16    141  |  104  |  48<H>  ----------------------------<  75  4.0   |  22  |  4.0<H>    Ca    7.5<L>      16 Oct 2019 07:18    TPro  5.1<L>  /  Alb  1.8<L>  /  TBili  <0.2  /  DBili  x   /  AST  12  /  ALT  9   /  AlkPhos  156<H>  10-16    10-14    136  |  94<L>  |  54<H>  ----------------------------<  122<H>  4.0   |  26  |  4.3<HH>    Ca    6.7<L>      14 Oct 2019 06:14  Phos  7.8     10-14  Mg     1.7     10-14          CAPILLARY BLOOD GLUCOSE  POCT Blood Glucose.: 122 mg/dL (14 Oct 2019 16:59)  POCT Blood Glucose.: 107 mg/dL (14 Oct 2019 06:03)  POCT Blood Glucose.: 88 mg/dL (14 Oct 2019 01:54)      RADIOLOGY & ADDITIONAL TESTS:    < from: Xray Foot AP + Lateral + Oblique, Left (10.12.19 @ 19:57) >  Heel ulcer identified, with diffuse osteopenia of osseousstructures with   Charcot joint. Consider further evaluation with MRI if osteomyelitis   suspected.      < end of copied text >      < from: Xray Chest 1 View- PORTABLE-Routine (10.14.19 @ 05:35) >    Stable right basilar opacity.        DRUG LEVEL:    Vancomycin Level, Random (10.15.19 @ 06:20)    Vancomycin Level, Random: 17.2: The "VANCOMYCIN, RANDOM" test should only be ordered for patients with  severe renal dysfunction or on dialysis. Therapeutic ranges have not been  established and results must be interpreted in the context of patient's  clinical condition.  NOTE: Therapeutic range for Trough Vancomycin is 10-20 mcg/mL. ug/mL        MICROBIOLOGY DATA:    Culture - Other (10.12.19 @ 18:55)    -  Gentamicin: R >8    -  Gentamicin: S <=1 Should not be used as monotherapy    -  Imipenem: S <=1    -  Levofloxacin: R >4    -  Linezolid: S 2    -  Meropenem: S <=1    -  Oxacillin: R >2    -  Penicillin: R 8    -  Piperacillin/Tazobactam: I 32    -  RIF- Rifampin: S <=1 Should not be used as monotherapy    -  Tetra/Doxy: S <=1    -  Tobramycin: I 8    -  Trimethoprim/Sulfamethoxazole: R >2/38    -  Trimethoprim/Sulfamethoxazole: S <=0.5/9.5    -  Vancomycin: S 2    -  Ertapenem: S <=0.5    -  Erythromycin: R >4    -  Cefepime: S <=2    -  Cefoxitin: S <=8    -  Ceftriaxone: S <=1 Enterobacter, Citrobacter, and Serratia may develop resistance during prolonged therapy    -  Ciprofloxacin: R >2    -  Clindamycin: R >4    -  Daptomycin: S 0.5    -  Amikacin: S <=16    -  Amoxicillin/Clavulanic Acid: I 16/8    -  Ampicillin: R >16 These ampicillin results predict results for amoxicillin    -  Ampicillin/Sulbactam: R >16/8 Enterobacter, Citrobacter, and Serratia may develop resistance during prolonged therapy (3-4 days)    -  Ampicillin/Sulbactam: R <=8/4    -  Aztreonam: S <=4    -  Cefazolin: S 16 Enterobacter, Citrobacter, and Serratia may develop resistance during prolonged therapy (3-4 days)    -  Cefazolin: R 16    Specimen Source: .Other L heel wound    Culture Results:   Few Escherichia coli  Few Enterococcus species "Susceptibilities not performed"  Rare Methicillin resistant Staphylococcus aureus    Organism Identification: Escherichia coli  Methicillin resistant Staphylococcus aureus    Organism: Escherichia coli    Organism: Methicillin resistant Staphylococcus aureus    Method Type: ALLISON    Method Type: ALLISON          Culture - Other (10.12.19 @ 18:55)    -  Trimethoprim/Sulfamethoxazole: R >2/38    -  Cefoxitin: S <=8    -  Ciprofloxacin: R >2    -  Ertapenem: S <=0.5    -  Gentamicin: R >8    -  Levofloxacin: R >4    -  Imipenem: S <=1    -  Meropenem: S <=1    -  Piperacillin/Tazobactam: I 32    -  Tobramycin: I 8    -  Amikacin: S <=16    -  Ampicillin: R >16 These ampicillin results predict results for amoxicillin    -  Ampicillin/Sulbactam: R >16/8 Enterobacter, Citrobacter, and Serratia may develop resistance during prolonged therapy (3-4 days)    -  Aztreonam: S <=4    -  Amoxicillin/Clavulanic Acid: I 16/8    -  Cefazolin: S 16 Enterobacter, Citrobacter, and Serratia may develop resistance during prolonged therapy (3-4 days)    -  Cefepime: S <=2    -  Ceftriaxone: S <=1 Enterobacter, Citrobacter, and Serratia may develop resistance during prolonged therapy    Specimen Source: .Other L heel wound    Culture Results:   Few Escherichia coli  Few Enterococcus species "Susceptibilities not performed"  Rare Staphylococcus aureus    Organism Identification: Escherichia coli    Organism: Escherichia coli    Method Type: ALLISON      Culture - Blood (10.12.19 @ 05:41)    Specimen Source: .Blood None    Culture Results:   No growth to date.

## 2019-10-16 NOTE — PROGRESS NOTE ADULT - SUBJECTIVE AND OBJECTIVE BOX
Nephrology progress note    Patient is seen and examined, events over the last 24 h noted .  On LR 75 cc/hr, transferred to the floor  Allergies:  sulfamethizole (Unknown)    Hospital Medications:   MEDICATIONS  (STANDING):  calcium carbonate    500 mG (Tums) Chewable 1 Tablet(s) Chew every 8 hours  cefepime   IVPB 1000 milliGRAM(s) IV Intermittent every 24 hours  cholecalciferol 1000 Unit(s) Oral daily  collagenase Ointment 1 Application(s) Topical daily  collagenase Ointment 1 Application(s) Topical daily  heparin  Injectable 5000 Unit(s) SubCutaneous every 12 hours  lactated ringers. 1000 milliLiter(s) (75 mL/Hr) IV Continuous <Continuous>  methadone    Tablet 80 milliGRAM(s) Oral four times a day  metroNIDAZOLE  IVPB 500 milliGRAM(s) IV Intermittent every 8 hours        VITALS:  T(F): 97.7 (10-16-19 @ 06:28), Max: 97.7 (10-16-19 @ 06:28)  HR: 82 (10-16-19 @ 06:28)  BP: 144/88 (10-16-19 @ 06:28)  RR: 18 (10-16-19 @ 06:28)  SpO2: 96% (10-15-19 @ 11:25)  Wt(kg): --    10-14 @ 07:01  -  10-15 @ 07:00  --------------------------------------------------------  IN: 1837.5 mL / OUT: 3375 mL / NET: -1537.5 mL    10-15 @ 07:01  -  10-16 @ 07:00  --------------------------------------------------------  IN: 1375 mL / OUT: 4050 mL / NET: -2675 mL          PHYSICAL EXAM:  Constitutional: NAD  HEENT: anicteric sclera, MMM  Neck: No JVD  Respiratory: CTA  Cardiovascular: S1, S2, RRR  Gastrointestinal: BS+, soft, NT/ND  Extremities: No peripheral edema  Neurological: resting comfortably in bed  : No CVA tenderness. Has brown.   Skin: No rashes  Vascular Access:    LABS:  10-16    141  |  104  |  48<H>  ----------------------------<  75  4.0   |  22  |  4.0<H>    Ca    7.5<L>      16 Oct 2019 07:18    TPro  5.1<L>  /  Alb  1.8<L>  /  TBili  <0.2  /  DBili      /  AST  12  /  ALT  9   /  AlkPhos  156<H>  10-16                          9.9    9.53  )-----------( 228      ( 16 Oct 2019 07:18 )             32.2       Urine Studies:    Protein/Creatinine Ratio Calculation: 18.4 Ratio (10-14 @ 19:45)  Creatinine, Random Urine: 29 mg/dL (10-14 @ 19:45)  Osmolality, Random Urine: 336 mos/kg (10-14 @ 19:45)  Sodium, Random Urine: 96.0 mmoL/L (10-14 @ 19:45)    RADIOLOGY & ADDITIONAL STUDIES:

## 2019-10-16 NOTE — PROGRESS NOTE ADULT - ASSESSMENT
58 M Pmhx of PVD s/p R AKA, paraplegia with multiple chronic bedsores and suprapubic catheter, previous hospitalization for AMS that responded to flumazenil and narcan, presents for an episode of unresponsiveness witnessed by family members found to be hypoglycemic .    IMPRESSION  #Possible Aspiration PNA, RLL opacity on CXR    hypothermic on admission, does not meet sepsis criteria  #Acute resp failure hypercapnia secondary to metabolic encephalopathy  #L foot necrotic ulcer, suspect OM      #Sacral decub buttocks, clean   #Hyponatremia, resolved  #Previous ucx 9/2019 kleb (R fluoro), kluyvera (R bactrim, fluoro, unasyn)  #Abx allergy: sulfamethizole (Unknown)     would recommend:    1. Dose of Vancomycin since wound culture grew Staph. aureus, sensitivity is pending  2. Continue Cefepime 1g q24h & IV flagyl 500mg q8h IV  3. Vancomycin  level in AM and Redose based on the level    4. Needs debridement of L heel, follows with Burn    -d/w CCU team 58 M Pmhx of PVD s/p R AKA, paraplegia with multiple chronic bedsores and suprapubic catheter, previous hospitalization for AMS that responded to flumazenil and narcan, presents for an episode of unresponsiveness witnessed by family members found to be hypoglycemic .    IMPRESSION  #Possible Aspiration PNA, RLL opacity on CXR    hypothermic on admission, does not meet sepsis criteria  #Acute resp failure hypercapnia secondary to metabolic encephalopathy  #L foot necrotic ulcer, suspect OM  - MRSA and E.coli      #Sacral decub buttocks, clean   #Hyponatremia, resolved  #Previous ucx 9/2019 kleb (R fluoro), kluyvera (R bactrim, fluoro, unasyn)  #Abx allergy: sulfamethizole (Unknown)     would recommend:    1. Dose of Vancomycin in AM to cover MRSA and  Continue Cefepime to cover E.coli. Keep Vancomycin level between 15 to 20  2. Discontinue Flagyl  3. MRI of Left heel to rule out deep infection, such as OM  4. Needs debridement of L heel, follows with Burn    -will follow up

## 2019-10-17 NOTE — PROGRESS NOTE ADULT - SUBJECTIVE AND OBJECTIVE BOX
Nephrology progress note    Patient is seen and examined, events over the last 24 h noted .  Denies complaints, appetite not great on IVF  Allergies:  sulfamethizole (Unknown)    Hospital Medications:   MEDICATIONS  (STANDING):  calcium carbonate    500 mG (Tums) Chewable 1 Tablet(s) Chew every 8 hours  cholecalciferol 1000 Unit(s) Oral daily  collagenase Ointment 1 Application(s) Topical daily  collagenase Ointment 1 Application(s) Topical daily  heparin  Injectable 5000 Unit(s) SubCutaneous every 12 hours  lactated ringers. 1000 milliLiter(s) (75 mL/Hr) IV Continuous <Continuous>  methadone    Tablet 80 milliGRAM(s) Oral four times a day        VITALS:  T(F): 98.1 (10-17-19 @ 05:52), Max: 98.7 (10-16-19 @ 14:04)  HR: 76 (10-17-19 @ 05:52)  BP: 144/90 (10-17-19 @ 05:52)  RR: 16 (10-17-19 @ 05:52)  SpO2: 96% (10-16-19 @ 18:18)  Wt(kg): --    10-15 @ 07:01  -  10-16 @ 07:00  --------------------------------------------------------  IN: 1375 mL / OUT: 4050 mL / NET: -2675 mL    10-16 @ 07:01  -  10-17 @ 07:00  --------------------------------------------------------  IN: 0 mL / OUT: 3150 mL / NET: -3150 mL          PHYSICAL EXAM:  Constitutional: NAD  HEENT: anicteric sclera, oropharynx clear, MMM  Neck: No JVD  Respiratory: CTAB, no wheezes, rales or rhonchi  Cardiovascular: S1, S2, RRR  Gastrointestinal: BS+, soft, NT/ND  Extremities: No cyanosis or clubbing. No peripheral edema  Neurological: A/O x 3  : No CVA tenderness. SPC+.   Skin: No rashes  Vascular Access:    LABS:  10-16    141  |  104  |  48<H>  ----------------------------<  75  4.0   |  22  |  4.0<H>    Ca    7.5<L>      16 Oct 2019 07:18    TPro  5.1<L>  /  Alb  1.8<L>  /  TBili  <0.2  /  DBili      /  AST  12  /  ALT  9   /  AlkPhos  156<H>  10-16                          10.0   7.94  )-----------( 204      ( 17 Oct 2019 07:08 )             32.5       Urine Studies:  Urinalysis Basic - ( 16 Oct 2019 15:30 )    Color: Yellow / Appearance: Slightly Cloudy / S.020 / pH:   Gluc:  / Ketone: Negative  / Bili: Negative / Urobili: 0.2 mg/dL   Blood:  / Protein: >=300 mg/dL / Nitrite: Negative   Leuk Esterase: Negative / RBC: 3-5 /HPF / WBC 1-2 /HPF   Sq Epi:  / Non Sq Epi: Occasional /HPF / Bacteria: Few      Protein/Creatinine Ratio Calculation: 23.0 Ratio (10-16 @ 15:30)  Creatinine, Random Urine: 22 mg/dL (10-16 @ 15:30)  Protein/Creatinine Ratio Calculation: 18.4 Ratio (10-14 @ 19:45)  Creatinine, Random Urine: 29 mg/dL (10-14 @ 19:45)  Osmolality, Random Urine: 336 mos/kg (10-14 @ 19:45)  Sodium, Random Urine: 96.0 mmoL/L (10-14 @ 19:45)    RADIOLOGY & ADDITIONAL STUDIES:

## 2019-10-17 NOTE — PROGRESS NOTE ADULT - PROBLEM SELECTOR PLAN 1
DC all abx   cx noted  No isolation needed- NO DRAINAGE FROM WOUND   SEE PRIOR ID NOTES FROM OCT 15  Recall if needed

## 2019-10-17 NOTE — PROGRESS NOTE ADULT - SUBJECTIVE AND OBJECTIVE BOX
MARGE BELLA  58y, Male  Allergy: sulfamethizole (Unknown)      CHIEF COMPLAINT: unresponsive at home (16 Oct 2019 21:48)      INTERVAL EVENTS/HPI  - No acute events overnight  - T(F): , Max: 98.7 (10-16-19 @ 14:04)  - Denies any worsening symptoms      ROS  10 system review- neg     SOCIAL HISTORY - not relevant     Substance Use (  ) never used  (  ) IVDU (  ) Other:  Tobacco Usage:  (   ) never smoked   (   ) former smoker   (   ) current smoker   Alcohol Usage: (   ) social  (   ) daily use (   ) denies  Sexual History:       FH noncontributory     VITALS:  T(F): 98.1, Max: 98.7 (10-16-19 @ 14:04)  HR: 76  BP: 144/90  RR: 16Vital Signs Last 24 Hrs  T(C): 36.7 (17 Oct 2019 05:52), Max: 37.1 (16 Oct 2019 14:04)  T(F): 98.1 (17 Oct 2019 05:52), Max: 98.7 (16 Oct 2019 14:04)  HR: 76 (17 Oct 2019 05:52) (76 - 82)  BP: 144/90 (17 Oct 2019 05:52) (136/73 - 161/85)  BP(mean): --  RR: 16 (17 Oct 2019 05:52) (16 - 18)  SpO2: 96% (16 Oct 2019 18:18) (96% - 96%)    PHYSICAL EXAM:  Gen: NAD, resting in bed  HEENT: Normocephalic, atraumatic  Neck: supple, no lymphadenopathy  CV: s1 s 2+   Lungs: clear   Abdomen: Soft, BS present  Ext: Warm, well perfused. Left heel ulcers - granulating - clean and NO drainage   Neuro: non focal, awake  Skin: no rash, no erythema      TESTS & MEASUREMENTS:                        9.9    9.53  )-----------( 228      ( 16 Oct 2019 07:18 )             32.2     10-    141  |  104  |  48<H>  ----------------------------<  75  4.0   |  22  |  4.0<H>    Ca    7.5<L>      16 Oct 2019 07:18    TPro  5.1<L>  /  Alb  1.8<L>  /  TBili  <0.2  /  DBili  x   /  AST  12  /  ALT  9   /  AlkPhos  156<H>  10-16      LIVER FUNCTIONS - ( 16 Oct 2019 07:18 )  Alb: 1.8 g/dL / Pro: 5.1 g/dL / ALK PHOS: 156 U/L / ALT: 9 U/L / AST: 12 U/L / GGT: x           Urinalysis Basic - ( 16 Oct 2019 15:30 )    Color: Yellow / Appearance: Slightly Cloudy / S.020 / pH: x  Gluc: x / Ketone: Negative  / Bili: Negative / Urobili: 0.2 mg/dL   Blood: x / Protein: >=300 mg/dL / Nitrite: Negative   Leuk Esterase: Negative / RBC: 3-5 /HPF / WBC 1-2 /HPF   Sq Epi: x / Non Sq Epi: Occasional /HPF / Bacteria: Few        Culture - Other (collected 10-12-19 @ 18:55)  Source: .Other L heel wound  Final Report (10-15-19 @ 18:14):    Few Escherichia coli    Few Enterococcus species "Susceptibilities not performed"    Rare Methicillin resistant Staphylococcus aureus  Organism: Escherichia coli  Methicillin resistant Staphylococcus aureus (10-15-19 @ 18:14)  Organism: Methicillin resistant Staphylococcus aureus (10-15-19 @ 18:14)      -  Ampicillin/Sulbactam: R <=8/4      -  Cefazolin: R 16      -  Clindamycin: R >4      -  Daptomycin: S 0.5      -  Erythromycin: R >4      -  Gentamicin: S <=1 Should not be used as monotherapy      -  Linezolid: S 2      -  Oxacillin: R >2      -  Penicillin: R 8      -  RIF- Rifampin: S <=1 Should not be used as monotherapy      -  Tetra/Doxy: S <=1      -  Trimethoprim/Sulfamethoxazole: S <=0.5/9.5      -  Vancomycin: S 2      Method Type: ALLISON  Organism: Escherichia coli (10-15-19 @ 18:14)      -  Amikacin: S <=16      -  Amoxicillin/Clavulanic Acid: I 16/8      -  Ampicillin: R >16 These ampicillin results predict results for amoxicillin      -  Ampicillin/Sulbactam: R >16/8 Enterobacter, Citrobacter, and Serratia may develop resistance during prolonged therapy (3-4 days)      -  Aztreonam: S <=4      -  Cefazolin: S 16 Enterobacter, Citrobacter, and Serratia may develop resistance during prolonged therapy (3-4 days)      -  Cefepime: S <=2      -  Cefoxitin: S <=8      -  Ceftriaxone: S <=1 Enterobacter, Citrobacter, and Serratia may develop resistance during prolonged therapy      -  Ciprofloxacin: R >2      -  Ertapenem: S <=0.5      -  Gentamicin: R >8      -  Imipenem: S <=1      -  Levofloxacin: R >4      -  Meropenem: S <=1      -  Piperacillin/Tazobactam: I 32      -  Tobramycin: I 8      -  Trimethoprim/Sulfamethoxazole: R >/38      Method Type: ALLISON    Culture - Blood (collected 10-12-19 @ 05:41)  Source: .Blood None  Preliminary Report (10-13-19 @ 18:00):    No growth to date.    Culture - Blood (collected 10-11-19 @ 22:00)  Source: .Blood Blood-Peripheral  Preliminary Report (10-13-19 @ 18:00):    No growth to date.    Culture - Blood (collected 10-11-19 @ 22:00)  Source: .Blood Blood-Peripheral  Preliminary Report (10-13-19 @ 18:00):    No growth to date.            INFECTIOUS DISEASES TESTING  Hepatitis C Virus Interpretation: Nonreact (19 @ 07:13)  Hepatitis B Surface Antigen: Nonreact (19 @ 07:13)  Hepatitis C Virus Interpretation: Nonreact (19 @ 05:01)      RADIOLOGY & ADDITIONAL TESTS:      CARDIOLOGY TESTING  12 Lead ECG:   Ventricular Rate 71 BPM    Atrial Rate 71 BPM    P-R Interval 192 ms    QRS Duration 84 ms    Q-T Interval 484 ms    QTC Calculation(Bezet) 525 ms    P Axis 24 degrees    R Axis -19 degrees    T Axis 49 degrees    Diagnosis Line Sinus rhythm with Premature atrial complexes with Aberrant conduction  Low voltage QRS  Prolonged QT  Nonspecific ST and T wave abnormality  Abnormal ECG    Confirmed by AMMON TORREZ MD ([id number]) on 10/12/2019 1:23:14 PM (10-12-19 @ 07:32)  12 Lead ECG:   Ventricular Rate 72 BPM    Atrial Rate 72 BPM    P-R Interval 168 ms    QRS Duration 106 ms    Q-T Interval 440 ms    QTC Calculation(Bezet) 481 ms    P Axis 13 degrees    R Axis -34 degrees    T Axis 58 degrees    Diagnosis Line Normal sinus rhythm  Left axis deviation  Prolonged QT  Poor R wave progression  Abnormal ECG    Confirmed by AMMON TORREZ MD ([id number]) on 10/12/2019 1:29:00 PM (10-11-19 @ 19:03)      MEDICATIONS  calcium carbonate    500 mG (Tums) Chewable 1  cholecalciferol 1000  collagenase Ointment 1  collagenase Ointment 1  heparin  Injectable 5000  lactated ringers. 1000  methadone    Tablet 80      ANTIBIOTICS:

## 2019-10-17 NOTE — PROGRESS NOTE ADULT - ASSESSMENT
Patient is a 58yM Pmhx of PVD s/p R AKA, paraplegia with multiple chronic bedsores and chronic suprapubic catheter presented 4 days ago for an episode of unresponsiveness witnessed by family members. En route to the ER was found to be hypoglycemic, received  IV dextrose. In the ER found to be acidotic and continuously unresponsive , even with administration of narcan. and he was intubated for airway protection and hypercapnia. Patient was managed in MICU and was extubated successfully 10/13 and since continued to improve. While in ICU goals of care discussion was held twice he initially wished to be DNR, but rescinded DNR a day later in ICU. Follow up nephrology recommendation and start discharge planning based on that.       1) Right Lower Lobe Infiltrate Likely representing Pneumonitis suspect aspiration pneumonitis  s/p extubation  not requiring oxygen    2) SILVINA likely pre-renal on CKD IV (baseline 3.3)  Follow with Nephrology as outpt  Patient not in fluid overload at this time and currently making urine.   Hold all Nephro Toxic Drugs   Renal Ultrasound reviewed    3) Severe anemia - 2nd to CKD  -R/O iron deficiency and other causes    4) Vit D deficiency - profound, 25 - vit D - undetectable <5, with iPTH 200, hypocalcemia  - started on supplement    5) Proteinuria  -nephrology following    6) Malnutrition - Alb 1.7   - needs nutrition support  -encourage oral hydration     7) Severe PAD -  s/p Rt BKA/Sacral and multiple leg/Lt foot ulcers    8) HTN   -well controlled on current tx    9) Toxic encephalopathy  Likely secondary to Poly Pharmacy and Medication non compliance  Gabapentin along with several other sedatives held.  Mental status much improved     10) Persistent Chronic Pain   Patient placed back on Methadone Home dose of 80 mg four times daily.   Dose verified with [facility] at 10:37 am with Shan on 10/14/19    11) Decubitus ulcers present on admission/ Functional quad/ PVD s/p Right BKA  -ID follow up appreciated - no abx  - wound culture - E. Coli, Enterococcus, MRSA  -continue Isolation        DVT Prophylaxis   Heparin Sub Q  uses a wheelchair to get around        Progress Note Handoff  Pending Consults: none  Pending Tests: labs  Pending Results: labs  Family Discussion: discussed with pt - in agreement   Disposition: Home_____/SNF______/Other_____/Unknown at this time_____    Please call me with any questions at [phone]

## 2019-10-17 NOTE — PROGRESS NOTE ADULT - SUBJECTIVE AND OBJECTIVE BOX
MARGE BELLA  58y  Male      Patient is a 58y old Male who presents with a chief complaint of unresponsive at home (16 Oct 2019 10:00)      INTERVAL HPI/OVERNIGHT EVENTS:  Patient seen and examined earlier this morning.  Lying comfortably in bed.  In NAD. No complaints.  Suprapubic catheter draining well.       REVIEW OF SYSTEMS:  CONSTITUTIONAL: No fever, weight loss, or fatigue  EYES: No eye pain, visual disturbances, or discharge  ENMT:  No difficulty hearing, tinnitus, vertigo; No sinus or throat pain  NECK: No pain or stiffness  RESPIRATORY: No cough, wheezing, chills or hemoptysis; No shortness of breath  CARDIOVASCULAR: No chest pain, palpitations, dizziness, or leg swelling  GASTROINTESTINAL: No abdominal or epigastric pain. No nausea, vomiting, or hematemesis; No diarrhea or constipation. No melena or hematochezia.  GENITOURINARY: No dysuria, frequency, hematuria, or incontinence  NEUROLOGICAL: No headaches, memory loss, loss of strength, numbness, or tremors  SKIN: No itching, burning, rashes, or lesions   LYMPH NODES: No enlarged glands  ENDOCRINE: No heat or cold intolerance; No hair loss  MUSCULOSKELETAL: No joint pain or swelling; No muscle, back, or extremity pain  PSYCHIATRIC: No depression, anxiety, mood swings, or difficulty sleeping  HEME/LYMPH: No easy bruising, or bleeding gums  ALLERY AND IMMUNOLOGIC: No hives or eczema    Vital Signs Last 24 Hrs  T(C): 36.8 (17 Oct 2019 14:00), Max: 36.8 (16 Oct 2019 22:03)  T(F): 98.3 (17 Oct 2019 14:00), Max: 98.3 (16 Oct 2019 22:03)  HR: 75 (17 Oct 2019 14:00) (75 - 82)  BP: 145/87 (17 Oct 2019 14:00) (136/73 - 161/85)  BP(mean): --  RR: 16 (17 Oct 2019 05:52) (16 - 18)  SpO2: 96% (16 Oct 2019 18:18) (96% - 96%)    PHYSICAL EXAM:  GENERAL: NAD, well-groomed, well-developed  HEAD:  Atraumatic, Normocephalic  EYES: EOMI, PERRLA, conjunctiva and sclera clear  ENMT: No tonsillar erythema, exudates, or enlargement; Moist mucous membranes, Good dentition, No lesions  NECK: Supple, No JVD, Normal thyroid  NERVOUS SYSTEM:  Alert & Oriented X3, Good concentration; Motor Strength 5/5 B/L upper and lower extremities; DTRs 2+ intact and symmetric  CHEST/LUNG: Clear to percussion bilaterally; No rales, rhonchi, wheezing, or rubs  HEART: Regular rate and rhythm; No murmurs, rubs, or gallops  ABDOMEN: Soft, Nontender, Nondistended; Bowel sounds present, suprapubic catheter,   EXTREMITIES:  2+ Peripheral Pulses, No clubbing, cyanosis, or edema, right BKA  LYMPH: No lymphadenopathy noted  SKIN: No rashes or lesions    Consultant(s) Notes Reviewed:  [x ] YES  [ ] NO  Care Discussed with Consultants/Other Providers [ x] YES  [ ] NO    LAB:                          10.0   7.94  )-----------( 204      ( 17 Oct 2019 07:08 )             32.5     10-17    138  |  105  |  48<H>  ----------------------------<  85  3.7   |  21  |  3.9<H>    Ca    7.5<L>      17 Oct 2019 07:08    TPro  5.1<L>  /  Alb  1.8<L>  /  TBili  <0.2  /  DBili  x   /  AST  14  /  ALT  9   /  AlkPhos  153<H>  10-17                  Drug Dosing Weight  Height (cm): 167.64 (15 Oct 2019 09:50)  Weight (kg): 68.6 (15 Oct 2019 09:50)  BMI (kg/m2): 24.4 (15 Oct 2019 09:50)  BSA (m2): 1.78 (15 Oct 2019 09:50)    CAPILLARY BLOOD GLUCOSE  POCT Blood Glucose.: 171 mg/dL (17 Oct 2019 11:00)  POCT Blood Glucose.: 98 mg/dL (17 Oct 2019 07:24)  POCT Blood Glucose.: 197 mg/dL (16 Oct 2019 21:14)  POCT Blood Glucose.: 111 mg/dL (16 Oct 2019 18:03)  POCT Blood Glucose.: 122 mg/dL (16 Oct 2019 16:20)      I&O's Summary    16 Oct 2019 07:01  -  17 Oct 2019 07:00  --------------------------------------------------------  IN: 0 mL / OUT: 3150 mL / NET: -3150 mL    17 Oct 2019 07:01  -  17 Oct 2019 15:54  --------------------------------------------------------  IN: 0 mL / OUT: 1000 mL / NET: -1000 mL        MEDICATIONS  (STANDING):  calcium carbonate    500 mG (Tums) Chewable 1 Tablet(s) Chew every 8 hours  cholecalciferol 1000 Unit(s) Oral daily  collagenase Ointment 1 Application(s) Topical daily  collagenase Ointment 1 Application(s) Topical daily  heparin  Injectable 5000 Unit(s) SubCutaneous every 12 hours  lactated ringers. 1000 milliLiter(s) (75 mL/Hr) IV Continuous <Continuous>  methadone    Tablet 80 milliGRAM(s) Oral four times a day    MEDICATIONS  (PRN):  acetaminophen   Tablet .. 650 milliGRAM(s) Oral every 6 hours PRN Mild Pain (1 - 3)

## 2019-10-17 NOTE — PROGRESS NOTE ADULT - ASSESSMENT
SILVINA on CKD 4 baseline 3.3   - due to Volume depletion / decrease intake  - polyuric 4 L now, resolving SILVINA, creat down to 4.0  - cont LR 75 cc/hr - encourage po intake  Severe anemia - 2nd to CKD, R/O iron deficiency and other causes    Vit D deficiency - profound, 25 - vit D - undetectable <5, with iPTH 200, hypocalcemia  - start Ergocalciferol 50,000 po 2x week,     Proteinuria 18 gr as per SPC, repeat 23??  please obtain UA and spot prot/creat ratio (from top portion of catheter)    Malnutrition - Alb 1.7 - needs nutrition support, supplements   calorie counts etc  Severe PAD -  s/p Rt BKA     Sacral and multiple leg/Lt foot ulcers  Sepsis  Anemia - obtain Iron studies   - consider LINDY, Epogen 56970 units QW  Antibiotics as per ID - D/C'ed now  HTN can use CCB or labetalol

## 2019-10-18 NOTE — PROGRESS NOTE ADULT - SUBJECTIVE AND OBJECTIVE BOX
MARGE BELLA  58y  Male      Patient is a 58y old Male who presents with a chief complaint of unresponsive at home (16 Oct 2019 10:00)      INTERVAL HPI/OVERNIGHT EVENTS:  Patient seen and examined earlier this morning.  Lying comfortably in bed.  In NAD. No complaints.  Suprapubic catheter draining well.       REVIEW OF SYSTEMS:  CONSTITUTIONAL: No fever, weight loss, or fatigue  EYES: No eye pain, visual disturbances, or discharge  ENMT:  No difficulty hearing, tinnitus, vertigo; No sinus or throat pain  NECK: No pain or stiffness  RESPIRATORY: No cough, wheezing, chills or hemoptysis; No shortness of breath  CARDIOVASCULAR: No chest pain, palpitations, dizziness, or leg swelling  GASTROINTESTINAL: No abdominal or epigastric pain. No nausea, vomiting, or hematemesis; No diarrhea or constipation. No melena or hematochezia.  GENITOURINARY: No dysuria, frequency, hematuria, or incontinence  NEUROLOGICAL: No headaches, memory loss, loss of strength, numbness, or tremors  SKIN: No itching, burning, rashes, or lesions   LYMPH NODES: No enlarged glands  ENDOCRINE: No heat or cold intolerance; No hair loss  MUSCULOSKELETAL: No joint pain or swelling; No muscle, back, or extremity pain  PSYCHIATRIC: No depression, anxiety, mood swings, or difficulty sleeping  HEME/LYMPH: No easy bruising, or bleeding gums  ALLERY AND IMMUNOLOGIC: No hives or eczema    Vital Signs Last 24 Hrs  T(C): 36.8 (18 Oct 2019 05:49), Max: 36.8 (17 Oct 2019 14:00)  T(F): 98.3 (18 Oct 2019 05:49), Max: 98.3 (17 Oct 2019 14:00)  HR: 78 (18 Oct 2019 05:49) (75 - 80)  BP: 149/73 (18 Oct 2019 05:49) (138/76 - 149/73)  BP(mean): --  RR: 16 (18 Oct 2019 05:49) (16 - 16)  SpO2: --    PHYSICAL EXAM:  GENERAL: NAD, well-groomed, well-developed  HEAD:  Atraumatic, Normocephalic  EYES: EOMI, PERRLA, conjunctiva and sclera clear  ENMT: No tonsillar erythema, exudates, or enlargement; Moist mucous membranes, Good dentition, No lesions  NECK: Supple, No JVD, Normal thyroid  NERVOUS SYSTEM:  Alert & Oriented X3, Good concentration; Motor Strength 5/5 B/L upper and lower extremities; DTRs 2+ intact and symmetric  CHEST/LUNG: Clear to percussion bilaterally; No rales, rhonchi, wheezing, or rubs  HEART: Regular rate and rhythm; No murmurs, rubs, or gallops  ABDOMEN: Soft, Nontender, Nondistended; Bowel sounds present, suprapubic catheter,   EXTREMITIES:  2+ Peripheral Pulses, No clubbing, cyanosis, or edema, right BKA  LYMPH: No lymphadenopathy noted  SKIN: No rashes or lesions    Consultant(s) Notes Reviewed:  [x ] YES  [ ] NO  Care Discussed with Consultants/Other Providers [ x] YES  [ ] NO    LAB:  Pending for today                             10.0   7.94  )-----------( 204      ( 17 Oct 2019 07:08 )             32.5     10-17    138  |  105  |  48<H>  ----------------------------<  85  3.7   |  21  |  3.9<H>    Ca    7.5<L>      17 Oct 2019 07:08    TPro  5.1<L>  /  Alb  1.8<L>  /  TBili  <0.2  /  DBili  x   /  AST  14  /  ALT  9   /  AlkPhos  153<H>  10-17                  Drug Dosing Weight  Height (cm): 167.64 (15 Oct 2019 09:50)  Weight (kg): 68.6 (15 Oct 2019 09:50)  BMI (kg/m2): 24.4 (15 Oct 2019 09:50)  BSA (m2): 1.78 (15 Oct 2019 09:50)    CAPILLARY BLOOD GLUCOSE  POCT Blood Glucose.: 80 mg/dL (18 Oct 2019 11:06)  POCT Blood Glucose.: 118 mg/dL (18 Oct 2019 07:14)  POCT Blood Glucose.: 101 mg/dL (17 Oct 2019 21:53)  POCT Blood Glucose.: 136 mg/dL (17 Oct 2019 17:22)  POCT Blood Glucose.: 77 mg/dL (17 Oct 2019 16:13)        MEDICATIONS  (STANDING):  calcium carbonate    500 mG (Tums) Chewable 1 Tablet(s) Chew every 8 hours  cholecalciferol 1000 Unit(s) Oral daily  collagenase Ointment 1 Application(s) Topical daily  collagenase Ointment 1 Application(s) Topical daily  heparin  Injectable 5000 Unit(s) SubCutaneous every 12 hours  lactated ringers. 1000 milliLiter(s) (75 mL/Hr) IV Continuous <Continuous>  methadone    Tablet 80 milliGRAM(s) Oral four times a day    MEDICATIONS  (PRN):  acetaminophen   Tablet .. 650 milliGRAM(s) Oral every 6 hours PRN Mild Pain (1 - 3)

## 2019-10-18 NOTE — PROGRESS NOTE ADULT - ASSESSMENT
Patient is a 58yM Pmhx of PVD s/p R AKA, paraplegia with multiple chronic bedsores and chronic suprapubic catheter presented 4 days ago for an episode of unresponsiveness witnessed by family members. En route to the ER was found to be hypoglycemic, received  IV dextrose. In the ER found to be acidotic and continuously unresponsive , even with administration of narcan. and he was intubated for airway protection and hypercapnia. Patient was managed in MICU and was extubated successfully 10/13 and since continued to improve. While in ICU goals of care discussion was held twice he initially wished to be DNR, but rescinded DNR a day later in ICU. Follow up nephrology recommendation and start discharge planning based on that.       1) Right Lower Lobe Infiltrate Likely representing Pneumonitis suspect aspiration pneumonitis  s/p extubation  not requiring oxygen    2) SILVINA likely pre-renal on CKD IV (baseline 3.3)  Follow with Nephrology as outpt  Patient not in fluid overload at this time and currently making urine.   Hold all Nephro Toxic Drugs   Renal Ultrasound reviewed  repeat bmp today    3) Severe anemia - 2nd to CKD  -R/O iron deficiency and other causes    4) Vit D deficiency - profound, 25 - vit D - undetectable <5, with iPTH 200, hypocalcemia  - started on supplement    5) Proteinuria  -nephrology following    6) Malnutrition - Alb 1.7   - needs nutrition support  -encourage oral hydration     7) Severe PAD -  s/p Rt BKA/Sacral and multiple leg/Lt foot ulcers    8) HTN   -well controlled on current tx    9) Toxic encephalopathy  Likely secondary to Poly Pharmacy and Medication non compliance  Gabapentin along with several other sedatives held.  Mental status improved     10) Persistent Chronic Pain   Patient placed back on Methadone Home dose of 80 mg four times daily.   Dose verified with [facility] at 10:37 am with Shan on 10/14/19    11) Decubitus ulcers present on admission/ Functional quad/ PVD s/p Right BKA  -ID follow up appreciated - no abx  - wound culture - E. Coli, Enterococcus, MRSA  -d/c isolation       DVT Prophylaxis   Heparin Sub Q  uses a wheelchair to get around        Progress Note Handoff  Pending Consults: none  Pending Tests: labs  Pending Results: labs  Family Discussion: discussed with pt - in agreement - anticipate d/c in 24-48hrs  Disposition: Home_x____/SNF______/Other_____/Unknown at this time_____    Please call me with any questions at [phone]

## 2019-10-18 NOTE — PROGRESS NOTE ADULT - ASSESSMENT
SILVINA on CKD 4 baseline 3.3   - due to Volume depletion / decrease intake  - polyuric 4 -> 3.5 L , resolving SILVINA,   - cont LR 75 cc/hr - encourage po intake  Vit D deficiency - profound, 25 - vit D - undetectable <5, with iPTH 200, hypocalcemia  - start Ergocalciferol 50,000 po 2x week,     Proteinuria 18 gr as per SPC, repeat 24  Unusually high SPC ratio with serum albumin 1.8 raises uri possibility for a glomerular disease. However, pt has stated multiple times that he does not want aggressive medical treatment, creat is improving,, therefore I would not pursue further w/u    Malnutrition - Alb 1.7 - needs nutrition support, supplements   calorie counts etc  Severe PAD -  s/p Rt BKA     Sacral and multiple leg/Lt foot ulcers  Sepsis  Anemia - obtain Iron studies   - consider LINDY, Epogen 96383 units QW  Antibiotics as per ID - D/C'ed now  will follow

## 2019-10-18 NOTE — CHART NOTE - NSCHARTNOTEFT_GEN_A_CORE
Registered Dietitian Follow-Up     Patient Profile Reviewed                           Yes [v]   No []     Nutrition History Previously Obtained        Yes [v]  No []       Pertinent Subjective Information:     Pertinent Medical Interventions: Pt was downgraded to medical floor. Pneumonitis suspect aspiration pneumonitis - no need for oxygen. SILVINA likely pre-renal on CKD IV - Nephrology following, resolving SILVINA per MD.  Severe anemia - 2nd to CKD.  Vit D deficiency - started on supplement. Severe PAD -  s/p rt BKA/sacral and multiple leg/lt foot ulcers. Toxic encephalopathy - likely secondary to poly pharmacy and medication non compliance, mental status much improved  per MD. Chronic pain.     Diet order: Renal (for pts receiving RRT)      Anthropometrics:  - Ht. 6'1" as reported by pt (vs documented 5'6')  - Wt. 66.8kg (10/14), 66.8kg (10/11) vs 72.8kg (11/14) - for calculations will use lowest/admission weight  - %wt change  - BMI - 21.1 (adjusted for rt BKA)   - IBW- 78.7kg +/- 10% (adjusted for rt BKA)      Pertinent Lab Data: (10/17) H/H- 10.0/32.5, BUN 48, Cr- 3.9, ALb 1.8, (10/14) vitamin D, 25 hydroxy <5, Calcium 7.0, (10/12) CRP- 11.25, 10/14 Mg 1.7     Pertinent Meds: heparin, IV abx, methadone, calcium carbonate, cholecalciferol, LR @75ml/hr, morphine      Physical Findings:  - Appearance: alert, oriented  - GI function: last BM 10/15, no GI complains  - Tubes: none  - Oral/Mouth cavity: no chewing/ swallowing difficulties reported,  - Skin: stage IV- sacrum and rt buttocks, unstageable injury to lt buttocks and lt heel.      Nutrition Requirements  Weight Used: 68.8kg - lowest this admission      Estimated Energy Needs    Continue [v]  Adjust []  Adjusted Energy Recommendations:   2065-2410kcal/day (30-35kcal/kg act wt, can go up to 40kcal/kg for multiple pressure ulcers)         Estimated Protein Needs    Continue [v]  Adjust []  Adjusted Protein Recommendations: 70-90  gm/day (1-1.3gm/kg act wt) - multiple pressure ulcers and SILVINA on CKD 4 considered        Estimated Fluid Needs        Continue [v]  Adjust []  Adjusted Fluid Recommendations:   per CCU team (or 1ml/kcal)       Nutrient Intake: pt is consuming % meal trays         [v] Previous Nutrition Diagnosis: Inadequate Energy Intake - ongoing             [] No active nutrition diagnosis identified at this time     Nutrition Diagnostic #1  Problem:  Etiology:  Statement:     Nutrition Diagnostic #2  Problem:  Etiology:  Statement:     Nutrition Intervention: Meal and snacks. Supplements.      Goal/Expected Outcome: PO intake > 75% meals, snacks and supplements over the next 4 days.      Indicator/Monitoring: Will monitor energy intake, diet order, labs, body composition, NFPF.     Recommended: Please liberalize diet to low Na. Add Prosource Gelatein Plus q 12hrs and Rene 1pkt q 12hrs (pt does not want any of the Ensure drinks or puddings, no Beneprotein on Prosource either). Add MVi daily and vitamin C - 200mg daily plus Zinc sulfate 220mg daily x 14 days. Obtain HbA1C Registered Dietitian Follow-Up     Patient Profile Reviewed                           Yes [v]   No []     Nutrition History Previously Obtained        Yes [v]  No []       Pertinent Subjective Information: Pt reports fair appetite however is still refusing most of the supplements.      Pertinent Medical Interventions: Downgraded to medical floor. Pneumonitis suspect aspiration pneumonitis - no need for oxygen. SILVINA likely pre-renal on CKD IV - Nephrology following, resolving SILVINA. Severe anemia - 2nd to CKD.  Vit D deficiency - started on supplement. Severe PAD -  s/p rt BKA/sacral and multiple leg/lt foot ulcers. Toxic encephalopathy - likely secondary to poly pharmacy and medication non compliance, mental status much improved  per MD. Chronic pain.     Diet order: Renal (for pts receiving RRT) (10/13)     Anthropometrics:  - Ht. 6'1" as reported by pt (vs documented 5'6')  - Wt. 66.8kg (10/14), 66.8kg (10/11) vs 72.8kg (11/14) - for calculations will use lowest/admission weight  - %wt change  - BMI - 21.1 (adjusted for rt BKA)   - IBW- 78.7kg +/- 10% (adjusted for rt BKA)      Pertinent Lab Data: (10/17) H/H- 10.0/32.5, BUN 48, Cr- 3.9, ALb 1.8, (10/14) vitamin D, 25 hydroxy <5, Calcium 7.0, (10/12) CRP- 11.25, 10/14 Mg 1.7     Pertinent Meds: heparin, IV abx, methadone, calcium carbonate, cholecalciferol, LR @75ml/hr, morphine      Physical Findings:  - Appearance: alert, oriented, eating breakfast  - GI function: 1 BM today  - Tubes: none  - Oral/Mouth cavity: no chewing/ swallowing difficulties reported,  - Skin: stage IV- sacrum and rt buttocks, unstageable injury to lt buttocks and lt heel.      Nutrition Requirements  Weight Used: 68.8kg - lowest this admission      Estimated Energy Needs    Continue [v]  Adjust []  Adjusted Energy Recommendations:   2065-2410kcal/day (30-35kcal/kg act wt, can go up to 40kcal/kg for multiple pressure ulcers)         Estimated Protein Needs    Continue [v]  Adjust []  Adjusted Protein Recommendations: 70-90  gm/day (1-1.3gm/kg act wt) - multiple pressure ulcers and SILVINA on CKD 4 considered        Estimated Fluid Needs        Continue [v]  Adjust []  Adjusted Fluid Recommendations:   per CCU team (or 1ml/kcal)       Nutrient Intake: pt reports fair appetite, as per flow sheets is consuming % meal trays         [v] Previous Nutrition Diagnosis: Inadequate Energy Intake - ongoing             [] No active nutrition diagnosis identified at this time     Nutrition Diagnostic #1  Problem:  Etiology:  Statement:     Nutrition Diagnostic #2  Problem:  Etiology:  Statement:     Nutrition Intervention: Meal and snacks. Supplements.      Goal/Expected Outcome: PO intake > 75% meals, snacks and supplements over the next 4 days.      Indicator/Monitoring: Will monitor energy intake, diet order, labs, body composition, NFPF.     Recommended: Please liberalize diet to low Na. Add Prosource Gelatein Plus q 12hrs and Rene 1pkt q 12hrs (pt does not want any of the Ensure drinks or puddings, no Beneprotein on Prosource either). Add MVi daily and vitamin C - 200mg daily plus Zinc sulfate 220mg daily x 14 days. Obtain HbA1C    Pt remains at risk

## 2019-10-18 NOTE — CHART NOTE - NSCHARTNOTEFT_GEN_A_CORE
Patient seen at bedside - resting comfortably.    Pt's diet changed as per recommendations from RD. Patient aware.     Patient without questions or concerns at this time.     Patient encouraged to contact PA with any further questions or concerns.

## 2019-10-18 NOTE — PROGRESS NOTE ADULT - SUBJECTIVE AND OBJECTIVE BOX
Nephrology progress note    Patient is seen and examined, events over the last 24 h noted .  No complaints. On IVF  Allergies:  sulfamethizole (Unknown)    Hospital Medications:   MEDICATIONS  (STANDING):  ascorbic acid 250 milliGRAM(s) Oral daily  calcium carbonate    500 mG (Tums) Chewable 1 Tablet(s) Chew every 8 hours  cholecalciferol 1000 Unit(s) Oral daily  collagenase Ointment 1 Application(s) Topical daily  collagenase Ointment 1 Application(s) Topical daily  heparin  Injectable 5000 Unit(s) SubCutaneous every 12 hours  lactated ringers. 1000 milliLiter(s) (75 mL/Hr) IV Continuous <Continuous>  methadone    Tablet 80 milliGRAM(s) Oral four times a day  multivitamin 1 Tablet(s) Oral daily  zinc sulfate 220 milliGRAM(s) Oral daily        VITALS:  T(F): 98.7 (10-18-19 @ 14:00), Max: 98.7 (10-18-19 @ 14:00)  HR: 71 (10-18-19 @ 14:00)  BP: 147/73 (10-18-19 @ 14:00)  RR: 16 (10-18-19 @ 14:00)  SpO2: --  Wt(kg): --    10-16 @ 07:  -  10-17 @ 07:00  --------------------------------------------------------  IN: 0 mL / OUT: 3150 mL / NET: -3150 mL    10-17 @ 07:  -  10-18 @ 07:00  --------------------------------------------------------  IN: 1000 mL / OUT: 3550 mL / NET: -2550 mL    10-18 @ 07:  -  10-18 @ 15:25  --------------------------------------------------------  IN: 0 mL / OUT: 700 mL / NET: -700 mL          PHYSICAL EXAM:  Constitutional: NAD  HEENT: anicteric sclera, oropharynx clear, MMM  Neck: No JVD  Respiratory: CTAB, no wheezes, rales or rhonchi  Cardiovascular: S1, S2, RRR  Gastrointestinal: BS+, soft, NT/ND  Extremities: No peripheral edema  Neurological: A/O x 3,  : No CVA tenderness. SPC.   Skin: No rashes  Vascular Access:    LABS:  10-17    138  |  105  |  48<H>  ----------------------------<  85  3.7   |  21  |  3.9<H>    Ca    7.5<L>      17 Oct 2019 07:08    TPro  5.1<L>  /  Alb  1.8<L>  /  TBili  <0.2  /  DBili      /  AST  14  /  ALT  9   /  AlkPhos  153<H>  10-17                          10.0   7.94  )-----------( 204      ( 17 Oct 2019 07:08 )             32.5       Urine Studies:  Urinalysis Basic - ( 17 Oct 2019 10:00 )    Color: Yellow / Appearance: Clear / S.025 / pH:   Gluc:  / Ketone: Negative  / Bili: Negative / Urobili: 0.2 mg/dL   Blood:  / Protein: >=300 mg/dL / Nitrite: Negative   Leuk Esterase: Negative / RBC: 1-2 /HPF / WBC 1-2 /HPF   Sq Epi:  / Non Sq Epi: Few /HPF / Bacteria: Few      Creatinine, Random Urine: 17 mg/dL (10-17 @ 10:00)  Protein/Creatinine Ratio Calculation: 24.2 Ratio (10-17 @ 10:00)  Protein/Creatinine Ratio Calculation: 23.0 Ratio (10-16 @ 15:30)  Creatinine, Random Urine: 22 mg/dL (10-16 @ 15:30)  Protein/Creatinine Ratio Calculation: 18.4 Ratio (10-14 @ 19:45)  Creatinine, Random Urine: 29 mg/dL (10-14 @ 19:45)  Osmolality, Random Urine: 336 mos/kg (10-14 @ 19:45)  Sodium, Random Urine: 96.0 mmoL/L (10-14 @ 19:45)    RADIOLOGY & ADDITIONAL STUDIES:

## 2019-10-18 NOTE — CHART NOTE - NSCHARTNOTEFT_GEN_A_CORE
Contacted [facility] pharmacy to verify patient's methadone dose. Pt takes methadone 10mg 8 tabs four times day, MDD: 32 tabs. Pt's last refill was on 9/17/19.

## 2019-10-19 NOTE — PROGRESS NOTE ADULT - SUBJECTIVE AND OBJECTIVE BOX
MARGE BELLA  58y  Male      Patient is a 58y old Male who presents with a chief complaint of unresponsive at home (16 Oct 2019 10:00)      INTERVAL HPI/OVERNIGHT EVENTS:  Patient seen and examined earlier this morning.  Lying comfortably in bed.  In NAD. No complaints.  Suprapubic catheter draining well.  Pt asked that his Oxybutynin be restarted for bladder spasms        REVIEW OF SYSTEMS:  CONSTITUTIONAL: No fever, weight loss, or fatigue  EYES: No eye pain, visual disturbances, or discharge  ENMT:  No difficulty hearing, tinnitus, vertigo; No sinus or throat pain  NECK: No pain or stiffness  RESPIRATORY: No cough, wheezing, chills or hemoptysis; No shortness of breath  CARDIOVASCULAR: No chest pain, palpitations, dizziness, or leg swelling  GASTROINTESTINAL: No abdominal or epigastric pain. No nausea, vomiting, or hematemesis; No diarrhea or constipation. No melena or hematochezia.  GENITOURINARY: No dysuria, frequency, hematuria, or incontinence  NEUROLOGICAL: No headaches, memory loss, loss of strength, numbness, or tremors  SKIN: No itching, burning, rashes, or lesions   LYMPH NODES: No enlarged glands  ENDOCRINE: No heat or cold intolerance; No hair loss  MUSCULOSKELETAL: No joint pain or swelling; No muscle, back, or extremity pain  PSYCHIATRIC: No depression, anxiety, mood swings, or difficulty sleeping  HEME/LYMPH: No easy bruising, or bleeding gums  ALLERY AND IMMUNOLOGIC: No hives or eczema    Vital Signs Last 24 Hrs  T(C): 35.9 (19 Oct 2019 05:21), Max: 37.1 (18 Oct 2019 14:00)  T(F): 96.7 (19 Oct 2019 05:21), Max: 98.7 (18 Oct 2019 14:00)  HR: 73 (19 Oct 2019 05:21) (71 - 77)  BP: 140/75 (19 Oct 2019 05:21) (140/75 - 147/81)  BP(mean): --  RR: 16 (19 Oct 2019 05:21) (16 - 16)  SpO2: --    PHYSICAL EXAM:  GENERAL: NAD, well-groomed, well-developed  HEAD:  Atraumatic, Normocephalic  EYES: EOMI, PERRLA, conjunctiva and sclera clear  ENMT: No tonsillar erythema, exudates, or enlargement; Moist mucous membranes, Good dentition, No lesions  NECK: Supple, No JVD, Normal thyroid  NERVOUS SYSTEM:  Alert & Oriented X3, Good concentration; Motor Strength 5/5 B/L upper and lower extremities; DTRs 2+ intact and symmetric  CHEST/LUNG: Clear to percussion bilaterally; No rales, rhonchi, wheezing, or rubs  HEART: Regular rate and rhythm; No murmurs, rubs, or gallops  ABDOMEN: Soft, Nontender, Nondistended; Bowel sounds present, suprapubic catheter +  EXTREMITIES:  2+ Peripheral Pulses, No clubbing, cyanosis, or edema, right BKA  LYMPH: No lymphadenopathy noted  SKIN: No rashes or lesions    Consultant(s) Notes Reviewed:  [x ] YES  [ ] NO  Care Discussed with Consultants/Other Providers [ x] YES  [ ] NO    LAB:                          9.7    9.32  )-----------( 203      ( 18 Oct 2019 15:21 )             31.5     10-18    140  |  105  |  45<H>  ----------------------------<  80  4.3   |  25  |  4.2<HH>    Ca    8.1<L>      18 Oct 2019 15:21    TPro  5.0<L>  /  Alb  1.7<L>  /  TBili  <0.2  /  DBili  x   /  AST  17  /  ALT  9   /  AlkPhos  158<H>  10-18        Drug Dosing Weight  Height (cm): 167.64 (15 Oct 2019 09:50)  Weight (kg): 68.6 (15 Oct 2019 09:50)  BMI (kg/m2): 24.4 (15 Oct 2019 09:50)  BSA (m2): 1.78 (15 Oct 2019 09:50)    CAPILLARY BLOOD GLUCOSE  POCT Blood Glucose.: 88 mg/dL (19 Oct 2019 09:54)  POCT Blood Glucose.: 130 mg/dL (19 Oct 2019 04:24)  POCT Blood Glucose.: 114 mg/dL (18 Oct 2019 21:20)  POCT Blood Glucose.: 135 mg/dL (18 Oct 2019 16:18)  POCT Blood Glucose.: 111 mg/dL (18 Oct 2019 13:09)  POCT Blood Glucose.: 80 mg/dL (18 Oct 2019 11:06)      MEDICATIONS  (STANDING):  ascorbic acid 250 milliGRAM(s) Oral daily  calcium carbonate    500 mG (Tums) Chewable 1 Tablet(s) Chew every 8 hours  cholecalciferol 1000 Unit(s) Oral daily  collagenase Ointment 1 Application(s) Topical daily  collagenase Ointment 1 Application(s) Topical daily  heparin  Injectable 5000 Unit(s) SubCutaneous every 12 hours  lactated ringers. 1000 milliLiter(s) (75 mL/Hr) IV Continuous <Continuous>  methadone    Tablet 80 milliGRAM(s) Oral four times a day  multivitamin 1 Tablet(s) Oral daily  oxybutynin 5 milliGRAM(s) Oral daily  zinc sulfate 220 milliGRAM(s) Oral daily    MEDICATIONS  (PRN):  acetaminophen   Tablet .. 650 milliGRAM(s) Oral every 6 hours PRN Mild Pain (1 - 3)

## 2019-10-19 NOTE — PROGRESS NOTE ADULT - ASSESSMENT
Patient is a 58yM Pmhx of PVD s/p R AKA, paraplegia with multiple chronic bedsores and chronic suprapubic catheter presented 4 days ago for an episode of unresponsiveness witnessed by family members. En route to the ER was found to be hypoglycemic, received  IV dextrose. In the ER found to be acidotic and continuously unresponsive , even with administration of narcan. and he was intubated for airway protection and hypercapnia. Patient was managed in MICU and was extubated successfully 10/13 and since continued to improve. While in ICU goals of care discussion was held twice he initially wished to be DNR, but rescinded DNR a day later in ICU. Follow up nephrology recommendation and start discharge planning based on that.       1) Right Lower Lobe Infiltrate Likely representing Pneumonitis suspect aspiration pneumonitis  s/p extubation  not requiring oxygen    2) SILVINA likely pre-renal on CKD IV (baseline 3.3) - kidney function slightly worsened   Follow with Nephrology as outpt  Patient not in fluid overload at this time and currently making urine.   Hold all Nephro Toxic Drugs   Renal Ultrasound reviewed  repeat bmp   continue IVF    3) Severe anemia - 2nd to CKD  -R/O iron deficiency and other causes  -hbg is stable     4) Vit D deficiency - profound, 25 - vit D - undetectable <5, with iPTH 200, hypocalcemia  - started on supplements    5) Proteinuria  -nephrology following    6) Malnutrition - Alb 1.7   -nutrition support consult done  -supplements ordered  -encourage oral hydration     7) Severe PAD -  s/p Rt BKA/Sacral and multiple leg/Lt foot ulcers    8) HTN   -well controlled on current tx    9) Toxic encephalopathy - resolved  Likely secondary to [facility] and Medication non compliance  Gabapentin along with several other sedatives held.  Mental status improved     10) Persistent Chronic Pain   Patient placed back on Methadone Home dose of 80 mg four times daily.   Dose [facility] at 10:37 am with Shan on 10/14/19  Script was filled 9/17    11) Decubitus ulcers present on admission/ Functional quad/ PVD s/p Right BKA  -ID follow up appreciated - no abx  - wound culture - E. Coli, Enterococcus, MRSA  -d/c isolation       DVT Prophylaxis   Heparin Sub Q  uses a wheelchair to get around        Progress Note Handoff  Pending Consults: none  Pending Tests: labs  Pending Results: labs  Family Discussion: discussed with pt - in agreement - anticipate d/c in 24-48hrs  Disposition: Home_x____/SNF______/Other_____/Unknown at this time_____    Please call me with any questions at extension [phone]

## 2019-10-20 NOTE — PROGRESS NOTE ADULT - SUBJECTIVE AND OBJECTIVE BOX
MARGE BELLA  58y  Male      Patient is a 58y old Male who presents with a chief complaint of unresponsive at home (16 Oct 2019 10:00)      INTERVAL HPI/OVERNIGHT EVENTS:  Patient seen and examined earlier this morning.  Lying comfortably in bed.  In NAD. No complaints.  Suprapubic catheter draining well.  Pt's blood work has improved.  Anticipated for tomorrow.  Continue IVF.  D/C fingersticks       REVIEW OF SYSTEMS:  CONSTITUTIONAL: No fever, weight loss, or fatigue  EYES: No eye pain, visual disturbances, or discharge  ENMT:  No difficulty hearing, tinnitus, vertigo; No sinus or throat pain  NECK: No pain or stiffness  RESPIRATORY: No cough, wheezing, chills or hemoptysis; No shortness of breath  CARDIOVASCULAR: No chest pain, palpitations, dizziness, or leg swelling  GASTROINTESTINAL: No abdominal or epigastric pain. No nausea, vomiting, or hematemesis; No diarrhea or constipation. No melena or hematochezia.  GENITOURINARY: No dysuria, frequency, hematuria, or incontinence  NEUROLOGICAL: No headaches, memory loss, loss of strength, numbness, or tremors  SKIN: No itching, burning, rashes, or lesions   LYMPH NODES: No enlarged glands  ENDOCRINE: No heat or cold intolerance; No hair loss  MUSCULOSKELETAL: No joint pain or swelling; No muscle, back, or extremity pain  PSYCHIATRIC: No depression, anxiety, mood swings, or difficulty sleeping  HEME/LYMPH: No easy bruising, or bleeding gums  ALLERY AND IMMUNOLOGIC: No hives or eczema    Vital Signs Last 24 Hrs  T(C): 36.8 (20 Oct 2019 05:23), Max: 36.8 (20 Oct 2019 05:23)  T(F): 98.2 (20 Oct 2019 05:23), Max: 98.2 (20 Oct 2019 05:23)  HR: 77 (20 Oct 2019 05:23) (70 - 77)  BP: 153/77 (20 Oct 2019 05:23) (141/71 - 153/77)  BP(mean): --  RR: 16 (20 Oct 2019 05:23) (16 - 16)  SpO2: --      PHYSICAL EXAM:  GENERAL: NAD, well-groomed, well-developed  HEAD:  Atraumatic, Normocephalic  EYES: EOMI, PERRLA, conjunctiva and sclera clear  ENMT: No tonsillar erythema, exudates, or enlargement; Moist mucous membranes, Good dentition, No lesions  NECK: Supple, No JVD, Normal thyroid  NERVOUS SYSTEM:  Alert & Oriented X3, Good concentration; Motor Strength 5/5 B/L upper and lower extremities; DTRs 2+ intact and symmetric  CHEST/LUNG: Clear to percussion bilaterally; No rales, rhonchi, wheezing, or rubs  HEART: Regular rate and rhythm; No murmurs, rubs, or gallops  ABDOMEN: Soft, Nontender, Nondistended; Bowel sounds present, suprapubic catheter +  EXTREMITIES:  2+ Peripheral Pulses, No clubbing, cyanosis, or edema, right BKA  LYMPH: No lymphadenopathy noted  SKIN: No rashes or lesions    Consultant(s) Notes Reviewed:  [x ] YES  [ ] NO  Care Discussed with Consultants/Other Providers [ x] YES  [ ] NO    LAB:                                     10.1   8.32  )-----------( 208      ( 19 Oct 2019 21:15 )             33.2     10-19    138  |  104  |  46<H>  ----------------------------<  72  4.3   |  22  |  3.8<H>    Ca    7.8<L>      19 Oct 2019 21:15    TPro  5.3<L>  /  Alb  1.8<L>  /  TBili  <0.2  /  DBili  x   /  AST  19  /  ALT  11  /  AlkPhos  165<H>  10-19        Drug Dosing Weight  Height (cm): 167.64 (15 Oct 2019 09:50)  Weight (kg): 68.6 (15 Oct 2019 09:50)  BMI (kg/m2): 24.4 (15 Oct 2019 09:50)  BSA (m2): 1.78 (15 Oct 2019 09:50)    CAPILLARY BLOOD GLUCOSE  POCT Blood Glucose.: 85 mg/dL (20 Oct 2019 06:17)  POCT Blood Glucose.: 77 mg/dL (19 Oct 2019 22:01)  POCT Blood Glucose.: 127 mg/dL (19 Oct 2019 17:23)  POCT Blood Glucose.: 65 mg/dL (19 Oct 2019 16:09)        MEDICATIONS  (STANDING):  ascorbic acid 250 milliGRAM(s) Oral daily  calcium carbonate    500 mG (Tums) Chewable 1 Tablet(s) Chew every 8 hours  cholecalciferol 1000 Unit(s) Oral daily  collagenase Ointment 1 Application(s) Topical daily  collagenase Ointment 1 Application(s) Topical daily  heparin  Injectable 5000 Unit(s) SubCutaneous every 12 hours  lactated ringers. 1000 milliLiter(s) (75 mL/Hr) IV Continuous <Continuous>  methadone    Tablet 80 milliGRAM(s) Oral four times a day  multivitamin 1 Tablet(s) Oral daily  oxybutynin 5 milliGRAM(s) Oral every 12 hours  zinc sulfate 220 milliGRAM(s) Oral daily    MEDICATIONS  (PRN):  acetaminophen   Tablet .. 650 milliGRAM(s) Oral every 6 hours PRN Mild Pain (1 - 3)

## 2019-10-20 NOTE — PROGRESS NOTE ADULT - ASSESSMENT
Patient is a 58yM Pmhx of PVD s/p R AKA, paraplegia with multiple chronic bedsores and chronic suprapubic catheter presented 4 days ago for an episode of unresponsiveness witnessed by family members. En route to the ER was found to be hypoglycemic, received  IV dextrose. In the ER found to be acidotic and continuously unresponsive , even with administration of narcan. and he was intubated for airway protection and hypercapnia. Patient was managed in MICU and was extubated successfully 10/13 and since continued to improve. While in ICU goals of care discussion was held twice he initially wished to be DNR, but rescinded DNR a day later in ICU. Follow up nephrology recommendation and start discharge planning based on that.       1) Right Lower Lobe Infiltrate Likely representing Pneumonitis suspect aspiration pneumonitis  s/p extubation  not requiring oxygen    2) SILVINA likely pre-renal on CKD IV (baseline 3.3) - kidney function improving  Follow with Nephrology as outpt  Patient not in fluid overload at this time and currently making urine.   Hold all Nephro Toxic Drugs   Renal Ultrasound reviewed  repeat bmp in am  continue IVF    3) Severe anemia - 2nd to CKD  -R/O iron deficiency and other causes  -hbg is stable     4) Vit D deficiency - profound, 25 - vit D - undetectable <5, with iPTH 200, hypocalcemia  - started on supplements    5) Proteinuria  -nephrology following    6) Malnutrition - Alb 1.7   -nutrition support consult done  -supplements ordered  -encourage oral hydration     7) Severe PAD -  s/p Rt BKA/Sacral and multiple leg/Lt foot ulcers    8) HTN   -well controlled on current tx    9) Toxic encephalopathy - resolved  Likely secondary to [facility] and Medication non compliance  Gabapentin along with several other sedatives held.  Mental status improved     10) Persistent Chronic Pain   Patient placed back on Methadone Home dose of 80 mg four times daily.   Dose verified with [facility] at 10:37 am with Shan on 10/14/19  Script was filled 9/17    11) Decubitus ulcers present on admission/ Functional quad/ PVD s/p Right BKA  -ID follow up appreciated - no abx  - wound culture - E. Coli, Enterococcus, MRSA  -d/c isolation       DVT Prophylaxis   Heparin Sub Q  uses a wheelchair to get around        Progress Note Handoff  Pending Consults: none  Pending Tests: labs  Pending Results: labs  Family Discussion: discussed with pt - in agreement - anticipate d/c in 24hr  Disposition: Home_x____/SNF______/Other_____/Unknown at this time_____    Please call me with any questions at extension [phone]

## 2019-10-21 NOTE — PROGRESS NOTE ADULT - ASSESSMENT
Patient is a 58yM Pmhx of PVD s/p R AKA, paraplegia with multiple chronic bedsores and chronic suprapubic catheter presented 4 days ago for an episode of unresponsiveness witnessed by family members. En route to the ER was found to be hypoglycemic, received  IV dextrose. In the ER found to be acidotic and continuously unresponsive , even with administration of narcan. and he was intubated for airway protection and hypercapnia. Patient was managed in MICU and was extubated successfully 10/13 and since continued to improve. While in ICU goals of care discussion was held twice he initially wished to be DNR, but rescinded DNR a day later in ICU. Follow up nephrology recommendation and start discharge planning based on that.       1) Right Lower Lobe Infiltrate Likely representing Pneumonitis suspect aspiration pneumonitis  s/p extubation  not requiring oxygen    2) SILVINA likely pre-renal on CKD IV (baseline 3.3) - kidney function improving  Follow with Nephrology as outpt  Hold all Nephro Toxic Drugs   Renal Ultrasound reviewed  repeat bmp as outpt    3) Severe anemia - 2nd to CKD  -R/O iron deficiency and other causes  -hbg is stable     4) Vit D deficiency - profound, 25 - vit D - undetectable <5, with iPTH 200, hypocalcemia  - started on supplements    5) Proteinuria  -nephrology following    6) Malnutrition - Alb 1.8  -nutrition support consult done  -supplements ordered  -encourage oral hydration     7) Severe PAD -  s/p Rt BKA/Sacral and multiple leg/Lt foot ulcers    8) HTN   -well controlled on current tx    9) Toxic encephalopathy - resolved  Likely secondary to [facility] and Medication non compliance  Gabapentin along with several other sedatives held.  Mental status improved     10) Persistent Chronic Pain   Patient placed back on Methadone Home dose of 80 mg four times daily.   Dose verified with [facility] at 10:37 am with Shan on 10/14/19  Script was filled 9/17    11) Decubitus ulcers present on admission/ Functional quad/ PVD s/p Right BKA  -ID follow up appreciated - no abx  - wound culture - E. Coli, Enterococcus, MRSA  -d/c isolation       DVT Prophylaxis   Heparin Sub Q  uses a wheelchair to get around        Progress Note Handoff  Pending Consults: none  Pending Tests: labs  Pending Results: labs  Family Discussion: discussed with pt and his mother- in agreement - d/c home today  Disposition: Home_x____/SNF______/Other_____/Unknown at this time_____    Please call me with any questions at [phone]

## 2019-10-21 NOTE — PROGRESS NOTE ADULT - SUBJECTIVE AND OBJECTIVE BOX
Nephrology progress note    Patient is seen and examined, events over the last 24 h noted .  Pt is being d/c'ed  Allergies:  sulfamethizole (Unknown)    Hospital Medications:   MEDICATIONS  (STANDING):  ascorbic acid 250 milliGRAM(s) Oral daily  calcium carbonate    500 mG (Tums) Chewable 1 Tablet(s) Chew every 8 hours  cholecalciferol 1000 Unit(s) Oral daily  collagenase Ointment 1 Application(s) Topical daily  collagenase Ointment 1 Application(s) Topical daily  heparin  Injectable 5000 Unit(s) SubCutaneous every 12 hours  lactated ringers. 1000 milliLiter(s) (75 mL/Hr) IV Continuous <Continuous>  methadone    Tablet 80 milliGRAM(s) Oral four times a day  multivitamin 1 Tablet(s) Oral daily  oxybutynin 5 milliGRAM(s) Oral every 12 hours  zinc sulfate 220 milliGRAM(s) Oral daily        VITALS:  T(F): 96.8 (10-21-19 @ 06:09), Max: 97 (10-20-19 @ 22:09)  HR: 70 (10-21-19 @ 06:09)  BP: 131/66 (10-21-19 @ 06:09)  RR: 16 (10-21-19 @ 06:09)  SpO2: --  Wt(kg): --    10-19 @ 07:  -  10-20 @ 07:00  --------------------------------------------------------  IN: 1000 mL / OUT: 2600 mL / NET: -1600 mL    10-20 @ 07:  -  10-21 @ 07:00  --------------------------------------------------------  IN: 0 mL / OUT: 4500 mL / NET: -4500 mL          PHYSICAL EXAM:  Constitutional: NAD  HEENT: anicteric sclera, MMM  Neck: No JVD  Respiratory: CTAB, no wheezes, rales or rhonchi  Cardiovascular: S1, S2, RRR  Gastrointestinal: BS+, soft, NT/ND  Extremities: No cyanosis or clubbing. No peripheral edema  Neurological: A/O x 3  : No CVA tenderness. SPC+  Skin: No rashes  Vascular Access:    LABS:  10-21    139  |  107  |  42<H>  ----------------------------<  61<L>  4.4   |  22  |  3.7<H>  Creatinine Trend: 3.7<--, 3.8<--, 4.2<--, 3.9<--, 4.0<--, 4.3<--    Ca    8.0<L>      21 Oct 2019 07:34    TPro  5.3<L>  /  Alb  1.8<L>  /  TBili  <0.2  /  DBili      /  AST  24  /  ALT  14  /  AlkPhos  163<H>  10-21                          10.1   8.32  )-----------( 208      ( 19 Oct 2019 21:15 )             33.2       Urine Studies:  Urinalysis Basic - ( 17 Oct 2019 10:00 )    Color: Yellow / Appearance: Clear / S.025 / pH:   Gluc:  / Ketone: Negative  / Bili: Negative / Urobili: 0.2 mg/dL   Blood:  / Protein: >=300 mg/dL / Nitrite: Negative   Leuk Esterase: Negative / RBC: 1-2 /HPF / WBC 1-2 /HPF   Sq Epi:  / Non Sq Epi: Few /HPF / Bacteria: Few      Creatinine, Random Urine: 17 mg/dL (10-17 @ 10:00)  Protein/Creatinine Ratio Calculation: 24.2 Ratio (10-17 @ 10:00)  Protein/Creatinine Ratio Calculation: 23.0 Ratio (10-16 @ 15:30)  Creatinine, Random Urine: 22 mg/dL (10-16 @ 15:30)  Protein/Creatinine Ratio Calculation: 18.4 Ratio (10-14 @ 19:45)  Creatinine, Random Urine: 29 mg/dL (10-14 @ 19:45)  Osmolality, Random Urine: 336 mos/kg (10-14 @ 19:45)  Sodium, Random Urine: 96.0 mmoL/L (10-14 @ 19:45)    RADIOLOGY & ADDITIONAL STUDIES:

## 2019-10-21 NOTE — DISCHARGE NOTE PROVIDER - HOSPITAL COURSE
58yM Pmhx of PVD s/p R AKA, paraplegia with multiple chronic bedsores and suprapubic catheter presents for an episode of unresponsiveness witnessed by family members. En route to the ER found to be hypoglycemic, given IV dextrose. In the ER found to be acidotic and continuously unresponsive , even with administration of narcan. Of note, he was recently admitted last month for a similar episode of unresponsiveness which responded to flumazenil and narcan. He revealed at that time that he had taken three xanax of 2 mg. He sees Dr. Gooden for his chronic decubiti. central line was placed by ed team and he was intubated for airway protection and hypercapnia.        Patient remained in the ICU fo several days and     was downgraded to the medical floor.        He was treated for the following medical problems:        1) Right Lower Lobe Infiltrate Likely representing Pneumonitis suspect aspiration pneumonitis    s/p extubation    not requiring oxygen        2) SILVINA likely pre-renal on CKD IV (baseline 3.3) - kidney function improving - Cr on d/c in 3.7    Follow with Nephrology as outpt    Hold all Nephro Toxic Drugs     Renal Ultrasound reviewed    repeat bmp as outpt        3) Severe anemia - 2nd to CKD    -R/O iron deficiency and other causes    -hbg is stable         4) Vit D deficiency - profound, 25 - vit D - undetectable <5, with iPTH 200, hypocalcemia    - started on supplements        5) Proteinuria    -nephrology following        6) Malnutrition - Alb 1.8    -nutrition support consult done    -supplements ordered    -encourage oral hydration         7) Severe PAD -  s/p Rt BKA/Sacral and multiple leg/Lt foot ulcers        8) HTN     -well controlled on current tx        9) Toxic encephalopathy - resolved    Likely secondary to [facility] and Medication non compliance    Gabapentin along with several other sedatives held.    Mental status improved         10) Persistent Chronic Pain     Patient placed back on Methadone Home dose of 80 mg four times daily.     Dose verified with [facility] at 10:37 am with Rastafari on 10/14/19    Script was filled 9/17        11) Decubitus ulcers present on admission/ Functional quad/ PVD s/p Right BKA    -ID follow up appreciated - no abx    - wound culture - E. Coli, Enterococcus, MRSA    -d/c isolation    - uses a wheelchair to get around        d/c home with VNS today    d/c planning took over 55 min    d/c papers done by me

## 2019-10-21 NOTE — PROGRESS NOTE ADULT - SUBJECTIVE AND OBJECTIVE BOX
MARGE BELLA  58y  Male      Patient is a 58y old Male who presents with a chief complaint of unresponsive at home (16 Oct 2019 10:00)      INTERVAL HPI/OVERNIGHT EVENTS:  Patient seen and examined earlier this morning.  Lying comfortably in bed.  In NAD. No complaints.  Suprapubic catheter draining well.  Pt's blood work has improved.  D/C home today      REVIEW OF SYSTEMS:  CONSTITUTIONAL: No fever, weight loss, or fatigue  EYES: No eye pain, visual disturbances, or discharge  ENMT:  No difficulty hearing, tinnitus, vertigo; No sinus or throat pain  NECK: No pain or stiffness  RESPIRATORY: No cough, wheezing, chills or hemoptysis; No shortness of breath  CARDIOVASCULAR: No chest pain, palpitations, dizziness, or leg swelling  GASTROINTESTINAL: No abdominal or epigastric pain. No nausea, vomiting, or hematemesis; No diarrhea or constipation. No melena or hematochezia.  GENITOURINARY: No dysuria, frequency, hematuria, or incontinence  NEUROLOGICAL: No headaches, memory loss, loss of strength, numbness, or tremors  SKIN: No itching, burning, rashes, or lesions   LYMPH NODES: No enlarged glands  ENDOCRINE: No heat or cold intolerance; No hair loss  MUSCULOSKELETAL: No joint pain or swelling; No muscle, back, or extremity pain  PSYCHIATRIC: No depression, anxiety, mood swings, or difficulty sleeping  HEME/LYMPH: No easy bruising, or bleeding gums  ALLERY AND IMMUNOLOGIC: No hives or eczema    Vital Signs Last 24 Hrs  T(C): 36 (21 Oct 2019 06:09), Max: 36.1 (20 Oct 2019 14:00)  T(F): 96.8 (21 Oct 2019 06:09), Max: 97 (20 Oct 2019 22:09)  HR: 70 (21 Oct 2019 06:09) (70 - 72)  BP: 131/66 (21 Oct 2019 06:09) (131/66 - 143/67)  BP(mean): --  RR: 16 (21 Oct 2019 06:09) (16 - 18)  SpO2: --    PHYSICAL EXAM:  GENERAL: NAD, well-groomed, well-developed  HEAD:  Atraumatic, Normocephalic  EYES: EOMI, PERRLA, conjunctiva and sclera clear  ENMT: No tonsillar erythema, exudates, or enlargement; Moist mucous membranes, Good dentition, No lesions  NECK: Supple, No JVD, Normal thyroid  NERVOUS SYSTEM:  Alert & Oriented X3, Good concentration; Motor Strength 5/5 B/L upper and lower extremities; DTRs 2+ intact and symmetric  CHEST/LUNG: Clear to percussion bilaterally; No rales, rhonchi, wheezing, or rubs  HEART: Regular rate and rhythm; No murmurs, rubs, or gallops  ABDOMEN: Soft, Nontender, Nondistended; Bowel sounds present, suprapubic catheter +  EXTREMITIES:  2+ Peripheral Pulses, No clubbing, cyanosis, or edema, right BKA  LYMPH: No lymphadenopathy noted  SKIN: No rashes or lesions    Consultant(s) Notes Reviewed:  [x ] YES  [ ] NO  Care Discussed with Consultants/Other Providers [ x] YES  [ ] NO    LAB:                          10.1   8.32  )-----------( 208      ( 19 Oct 2019 21:15 )             33.2     10-21    139  |  107  |  42<H>  ----------------------------<  61<L>  4.4   |  22  |  3.7<H>    Ca    8.0<L>      21 Oct 2019 07:34    TPro  5.3<L>  /  Alb  1.8<L>  /  TBili  <0.2  /  DBili  x   /  AST  24  /  ALT  14  /  AlkPhos  163<H>  10-21        Drug Dosing Weight  Height (cm): 167.64 (15 Oct 2019 09:50)  Weight (kg): 68.6 (15 Oct 2019 09:50)  BMI (kg/m2): 24.4 (15 Oct 2019 09:50)  BSA (m2): 1.78 (15 Oct 2019 09:50)        MEDICATIONS  (STANDING):  ascorbic acid 250 milliGRAM(s) Oral daily  calcium carbonate    500 mG (Tums) Chewable 1 Tablet(s) Chew every 8 hours  cholecalciferol 1000 Unit(s) Oral daily  collagenase Ointment 1 Application(s) Topical daily  collagenase Ointment 1 Application(s) Topical daily  heparin  Injectable 5000 Unit(s) SubCutaneous every 12 hours  lactated ringers. 1000 milliLiter(s) (75 mL/Hr) IV Continuous <Continuous>  methadone    Tablet 80 milliGRAM(s) Oral four times a day  multivitamin 1 Tablet(s) Oral daily  oxybutynin 5 milliGRAM(s) Oral every 12 hours  zinc sulfate 220 milliGRAM(s) Oral daily    MEDICATIONS  (PRN):  acetaminophen   Tablet .. 650 milliGRAM(s) Oral every 6 hours PRN Mild Pain (1 - 3)

## 2019-10-21 NOTE — DISCHARGE NOTE NURSING/CASE MANAGEMENT/SOCIAL WORK - PATIENT PORTAL LINK FT
You can access the FollowMyHealth Patient Portal offered by [facility] by registering at the following website: [email or web address]. By joining [employer]’s FollowMyHealth portal, you will also be able to view your health information using other applications (apps) compatible with our system.

## 2019-10-21 NOTE — DISCHARGE NOTE PROVIDER - NSDCCPCAREPLAN_GEN_ALL_CORE_FT
PRINCIPAL DISCHARGE DIAGNOSIS  Diagnosis: Acute renal failure superimposed on chronic kidney disease, unspecified CKD stage, unspecified acute renal failure type  Assessment and Plan of Treatment: follow up with nephrology  avoid any medication that will adversely affect your kidneys  repeat kidney function tests in one week with PMD/Kidney doctor

## 2019-10-21 NOTE — DISCHARGE NOTE PROVIDER - NSDCFUSCHEDAPPT_GEN_ALL_CORE_FT
MARGE BELLA ; 11/07/2019 ; [facility] MARGE BELLA ; 11/07/2019 ; N[facility]

## 2019-10-21 NOTE — PROGRESS NOTE ADULT - ASSESSMENT
SILVINA on CKD 4 baseline 3.3   - improving, creat close to baseline -    Vit D deficiency - profound, 25 - vit D - undetectable <5, with iPTH 200, hypocalcemia  - start Ergocalciferol 50,000 po 2x week,     Proteinuria 18 gr as per SPC, repeat 24  Unusually high SPC ratio with serum albumin 1.8 raises the possibility for a glomerular disease. However, pt has stated multiple times that he does not want aggressive medical treatment,    Pt needs renal f/u in our office in 2 weeks

## 2019-10-21 NOTE — PROGRESS NOTE ADULT - NSHPATTENDINGPLANDISCUSS_GEN_ALL_CORE
resident
Medicine resident
medical staff

## 2019-10-21 NOTE — DISCHARGE NOTE PROVIDER - CARE PROVIDER_API CALL
PMD- Dr. Van,   Phone: (   )    -  Fax: (   )    -  Follow Up Time: 1 week    Sadaf Soria)  Nephrology  [address] [place] [address]  Phone: [phone]  Fax: [phone]  Follow Up Time: 1 week

## 2019-10-21 NOTE — DISCHARGE NOTE PROVIDER - PROVIDER TOKENS
FREE:[LAST:[PMD- Dr. Van],PHONE:[(   )    -],FAX:[(   )    -],FOLLOWUP:[1 week]],PROVIDER:[TOKEN:[[id number]],FOLLOWUP:[1 week]]

## 2019-10-21 NOTE — PROGRESS NOTE ADULT - REASON FOR ADMISSION
unresponsive at home

## 2019-10-28 NOTE — CDI QUERY NOTE - NSCDIOTHERTXTBX_GEN_ALL_CORE_HH
Clinical documentation indicates that this patient has excisional debridement to the lt heel with 15 blade performed to the level of subcutaneous tissue                                                           In order to accurately capture the diagnosis to the greatest degree of specificity. can you please specify:  * excisional debridement to and including the subcutaneous tissue  *excisional debridement to but not including the subcutaneous tissue.  * Other please specify  * Unable to determine    In responding to this request, please exercise your independent professional judgment.  The fact that a question is asked does not imply that any particular answer is desired or expected.    Documentation clarification is required for compliance, accuracy in coding and billing, and reporting severity of illness, quality data   and risk of mortality.

## 2019-11-26 PROBLEM — I70.209 ATHEROSCLEROTIC PVD WITH ULCERATION: Status: ACTIVE | Noted: 2017-10-11

## 2019-11-26 NOTE — ED ADULT NURSE NOTE - NSIMPLEMENTINTERV_GEN_ALL_ED
Implemented All Fall with Harm Risk Interventions:  [place] to call system. Call bell, personal items and telephone within reach. Instruct patient to call for assistance. Room bathroom lighting operational. Non-slip footwear when patient is off stretcher. Physically safe environment: no spills, clutter or unnecessary equipment. Stretcher in lowest position, wheels locked, appropriate side rails in place. Provide visual cue, wrist band, yellow gown, etc. Monitor gait and stability. Monitor for mental status changes and reorient to person, place, and time. Review medications for side effects contributing to fall risk. Reinforce activity limits and safety measures with patient and family. Provide visual clues: red socks.

## 2019-11-26 NOTE — ED PROVIDER NOTE - ATTENDING CONTRIBUTION TO CARE
60 y/o male with h/o PAD s/p R BKA ~ 3 months ago, anemia, htn, CKD, sacral decub, hypothyroidism, in ER with c/o cold L foot for past 3-4 days.  Pt saw Dr. Gooden today and was sent to ER for eval/admission. no f/c.  no cp/sob.  no abd pain.  no ha/dizziness/loc.  PE - nad, nc/at, eomi, perrl, op - clear, mmm, cta b/l, no w/r/r, rrr, abd- soft, nt/nd, nabs, s/p R BKA, LLE - cool, no palpable or dopplerable pulses in foot, A&O x 3, no focal neuro deficits.  -arterial duplex, labs, vascular consult, abx, admit.

## 2019-11-26 NOTE — CONSULT NOTE ADULT - ASSESSMENT
58 year old male with a past medical history of vertebral fx causing paraplegia, HTN, DM, diabetic foot ulcers, s/p right BKA June 2019, s/p left AT and peroneal artery angioplasty in 2017 presenting to ED with worsening wounds of foot non responsive to wound care. On exam, patient has absent signals in left foot, multiple ulcerations and discolourations, and a necrotic appearing area of the great toe.     Plan:   - f/u arterial duplex 58 year old male with a past medical history of vertebral fx causing paraplegia, HTN, DM, diabetic foot ulcers, s/p right BKA June 2019, s/p left AT and peroneal artery angioplasty in 2017 presenting to ED with worsening wounds of foot non responsive to wound care. On exam, patient has absent signals in left foot, multiple ulcerations and discolourations, and a necrotic appearing area of the great toe.     Plan:   - no further intervention from podiatry   - BKA planned for Monday   - obtain cardiology clearance (Dr. Werner)

## 2019-11-26 NOTE — CONSULT NOTE ADULT - SUBJECTIVE AND OBJECTIVE BOX
MARGE BELLA [id number]  59y Male      HPI: 58 year old male with a past medical history of vertebral fx causing paraplegia, HTN, DM, diabetic foot ulcers, s/p right BKA June 2019, s/p left AT and peroneal artery angioplasty in 2017 presenting to ED with worsening wounds of foot non responsive to wound care. Patient has chronic ulcerations/wounds of foot. He dresses his wounds at home and on Friday noticed "patchy appearance" to left foot. Wounds continued to worsen and patient presented to the ED. He complains of upper leg pain and has some mild shortness of breath. He has no fever, chills, dizziness, or chest pain.       PAST MEDICAL & SURGICAL HISTORY:  Anemia  PAD (peripheral artery disease)  Hypertension  Suprapubic catheter  Pressure ulcer  Diabetes  Lumbar compression fracture  S/P below knee amputation, right  S/P debridement  Toe amputation status, right  H/O hernia repair        MEDICATIONS  (STANDING):  lactated ringers Bolus 1000 milliLiter(s) IV Bolus once  magnesium sulfate  IVPB 2 Gram(s) IV Intermittent Once    MEDICATIONS  (PRN):      Allergies    sulfamethizole (Unknown)    Intolerances        REVIEW OF SYSTEMS    [x] A ten-point review of systems was otherwise negative except as noted.  [ ] Due to altered mental status/intubation, subjective information were not able to be obtained from the patient. History was obtained, to the extent possible, from review of the chart and collateral sources of information.      Vital Signs Last 24 Hrs  T(C): 35.6 (26 Nov 2019 17:13), Max: 35.6 (26 Nov 2019 17:13)  T(F): 96 (26 Nov 2019 17:13), Max: 96 (26 Nov 2019 17:13)  HR: 103 (26 Nov 2019 17:13) (103 - 103)  BP: 198/150 (26 Nov 2019 17:13) (198/150 - 198/150)  BP(mean): --  RR: 18 (26 Nov 2019 17:13) (18 - 18)  SpO2: 99% (26 Nov 2019 17:13) (99% - 99%)    PHYSICAL EXAM:  GENERAL: NAD, well-appearing  CHEST/LUNG: Clear to auscultation bilaterally  HEART: Regular rate and rhythm  ABDOMEN: Soft, Nontender, Nondistended;   EXTREMITIES:  right BKA, multiple ulcerations and necrotic appearing area on great toe   PULSES: no signals in left foot       LABS:  Labs:  CAPILLARY BLOOD GLUCOSE                              7.7    11.53 )-----------( 389      ( 26 Nov 2019 18:30 )             25.2       Auto Neutrophil %: 75.3 % (11-26-19 @ 18:30)  Auto Immature Granulocyte %: 0.5 % (11-26-19 @ 18:30)    11-26    132<L>  |  103  |  71<HH>  ----------------------------<  60<L>  5.3<H>   |  13<L>  |  4.8<HH>      Calcium, Total Serum: 7.9 mg/dL (11-26-19 @ 18:30)      LFTs:             5.6  | <0.2 | 18       ------------------[186     ( 26 Nov 2019 18:30 )  1.9  | x    | 19          Lipase:x      Amylase:x             Coags:     14.60  ----< 1.27    ( 26 Nov 2019 18:30 )     36.7 MARGE BELLA [id number]  59y Male      HPI: 58 year old male with a past medical history of vertebral fx causing paraplegia, HTN, DM, diabetic foot ulcers, s/p right BKA June 2019, s/p left AT and peroneal artery angioplasty in 2017 presenting to ED with worsening wounds of foot non responsive to wound care. Patient has chronic ulcerations/wounds of foot. He dresses his wounds at home and on Friday noticed "patchy appearance" to left foot. Wounds continued to worsen and patient presented to the ED. He complains of upper leg pain and has some mild shortness of breath. He has no fever, chills, dizziness, or chest pain.       PAST MEDICAL & SURGICAL HISTORY:  Anemia  PAD (peripheral artery disease)  Hypertension  Suprapubic catheter  Pressure ulcer  Diabetes  Lumbar compression fracture  S/P below knee amputation, right  S/P debridement  Toe amputation status, right  H/O hernia repair        MEDICATIONS  (STANDING):  lactated ringers Bolus 1000 milliLiter(s) IV Bolus once  magnesium sulfate  IVPB 2 Gram(s) IV Intermittent Once    MEDICATIONS  (PRN):      Allergies    sulfamethizole (Unknown)    Intolerances        REVIEW OF SYSTEMS    [x] A ten-point review of systems was otherwise negative except as noted.  [ ] Due to altered mental status/intubation, subjective information were not able to be obtained from the patient. History was obtained, to the extent possible, from review of the chart and collateral sources of information.      Vital Signs Last 24 Hrs  T(C): 35.6 (26 Nov 2019 17:13), Max: 35.6 (26 Nov 2019 17:13)  T(F): 96 (26 Nov 2019 17:13), Max: 96 (26 Nov 2019 17:13)  HR: 103 (26 Nov 2019 17:13) (103 - 103)  BP: 198/150 (26 Nov 2019 17:13) (198/150 - 198/150)  BP(mean): --  RR: 18 (26 Nov 2019 17:13) (18 - 18)  SpO2: 99% (26 Nov 2019 17:13) (99% - 99%)    PHYSICAL EXAM:  GENERAL: NAD, well-appearing  CHEST/LUNG: Clear to auscultation bilaterally  HEART: Regular rate and rhythm  ABDOMEN: Soft, Nontender, Nondistended;   EXTREMITIES:  right BKA, multiple ulcerations and necrotic appearing area on great toe   PULSES: no signals in left foot       LABS:  Labs:  CAPILLARY BLOOD GLUCOSE                              7.7    11.53 )-----------( 389      ( 26 Nov 2019 18:30 )             25.2       Auto Neutrophil %: 75.3 % (11-26-19 @ 18:30)  Auto Immature Granulocyte %: 0.5 % (11-26-19 @ 18:30)    11-26    132<L>  |  103  |  71<HH>  ----------------------------<  60<L>  5.3<H>   |  13<L>  |  4.8<HH>      Calcium, Total Serum: 7.9 mg/dL (11-26-19 @ 18:30)      LFTs:             5.6  | <0.2 | 18       ------------------[186     ( 26 Nov 2019 18:30 )  1.9  | x    | 19          Lipase:x      Amylase:x             Coags:     14.60  ----< 1.27    ( 26 Nov 2019 18:30 )     36.7      < from: [facility] Duplex Low Ext Arterial, Ltd, Left (11.26.19 @ 18:55) >  Impression:      1. Moderate disease in the left SFA.  2. No flow noted in the left posterior tibial artery   ICD-10:I70.222    < end of copied text >

## 2019-11-26 NOTE — H&P ADULT - ASSESSMENT
Mr. Booker is a 58 yo male patient with PMHx of PVD s/p R AKA, paraplegia with multiple chronic bedsores and suprapubic catheter, HTN (not on any medication), CKD stage IV (not following with nephrology), hypothyroidism, opioid dependance, recent admission due to unresponsiveness, presents for 3-4 days of left foot coldness, pain and worsening ulcers.    # Chronic left foot peripheral artery disease + dry gangrene  - Patient with chronic wound of left foot with gangrenous changes.   - Doppler US showed Moderate disease in the left SFA, No flow noted in the left posterior tibial artery   - Evaluated by vascular surgery, plan for angiogram on Friday.   - Patient was not on any antiplatelets or statin.   - Given sepsis on admission (tachycardia + leukocytosis), will continue antibiotics. Will avoid vancomycin given SILVINA on CKD and patient refusing dialysis. Will start ceftriaxone + doxycycline.    # SILVINA on CKD   - Patient with serum creatinine 4.8 up from 3.7  - S/p 2L of LR in ED  - Patient refusing dialysis, or following with nephrologist. Will obtain an evaluation by nephrology prior to angiogram  - WIll continue calcium and vitamin D    # HTN  - not on any medication  - will start amlodipine 5 mg daily    # Opioid dependance  - Patient on methadone 80 mg QID + Oxycontin 60 mg QID   - Verified in iSTOP, will continue home regimen    # Chronic sacral bedsore.  - Followed by Dr. Gooden    # Hypothyroidism  - Will continue levothyroxine 50 mcg daily    # Normocytic anemia  - Possibly due to CKD, and multifactorial  - Patient denies any melena, or hematochezia, noted history of hemorrhoids  - Will trend CBC. Mr. Booker is a 58 yo male patient with PMHx of PVD s/p R AKA, paraplegia with multiple chronic bedsores and suprapubic catheter, HTN (not on any medication), CKD stage IV (not following with nephrology), hypothyroidism, opioid dependance, recent admission due to unresponsiveness, presents for 3-4 days of left foot coldness, pain and worsening ulcers.    # Chronic left foot peripheral artery disease + dry gangrene  - Patient with chronic wound of left foot with gangrenous changes.   - Doppler US showed Moderate disease in the left SFA, No flow noted in the left posterior tibial artery   - Evaluated by vascular surgery, plan for angiogram on Friday.   - Patient was not on any antiplatelets or statin.   - Given sepsis on admission (tachycardia + leukocytosis), will continue antibiotics. Will avoid vancomycin given SILVINA on CKD and patient refusing dialysis. Will start ceftriaxone + doxycycline.    # SILVINA on CKD + HAGMA  - Patient with serum creatinine 4.8 up from 3.7  - S/p 2L of LR in ED  - Patient refusing dialysis, or following with nephrologist. Will obtain an evaluation by nephrology prior to angiogram  - will obtain an ABG and evaluate the need to start sodium bicarbonate  - WIll continue calcium and vitamin D    # HTN  - not on any medication  - will start amlodipine 5 mg daily    # Opioid dependance  - Patient on methadone 80 mg QID + Oxycontin 60 mg QID   - Verified in iSTOP, will continue home regimen    # Chronic sacral bedsore.  - Followed by Dr. Gooden    # Hypothyroidism  - Will continue levothyroxine 50 mcg daily    # Normocytic anemia  - Possibly due to CKD, and multifactorial  - Patient denies any melena, or hematochezia, noted history of hemorrhoids  - Will trend CBC.

## 2019-11-26 NOTE — H&P ADULT - NSHPLABSRESULTS_GEN_ALL_CORE
Labs:                            7.7    11.53 )-----------( 389      ( 26 Nov 2019 18:30 )             25.2       11-26    132<L>  |  103  |  71<HH>  ----------------------------<  60<L>  5.3<H>   |  13<L>  |  4.8<HH>    Ca    7.9<L>      26 Nov 2019 18:30  Mg     1.3     11-26    TPro  5.6<L>  /  Alb  1.9<L>  /  TBili  <0.2  /  DBili  x   /  AST  18  /  ALT  19  /  AlkPhos  186<H>  11-26          PT/INR - ( 26 Nov 2019 18:30 )   PT: 14.60 sec;   INR: 1.27 ratio         PTT - ( 26 Nov 2019 18:30 )  PTT:36.7 sec    Lactate Trend        < from: VA Duplex Low Ext Arterial, Ltd, Left (11.26.19 @ 18:55) >    Impression:      1. Moderate disease in the left SFA.  2. No flow noted in the left posterior tibial artery   ICD-10:I70.222    < end of copied text >

## 2019-11-26 NOTE — H&P ADULT - ATTENDING COMMENTS
60 YO M with a PMH of PVD s/p R AKA, paraplegia with multiple chronic bedsores and suprapubic catheter, HTN, CKD4, hypothyroidism, opioid dependance, and recent admission/intubation for unresponsiveness 2/2 hypoglycemia who was sent to the hospital by his PCP with a c/o L foot coldness and pain for the past x 3-4 days. Associated with left foot change in color and worsening of a left heel ulcer that is rapidly worsening. Denies any trauma or falls. In the ED, the pt was evaluated by vascular and had pulses that were difficult to palpate. A doppler was unable to detect pulses in posterior tib. Sent for Doppler US. Admit to medicine due to multiple comorbid conditions. Physical exam with multiple pressure ulcers over sacrum. Right AKA present. LLE with absent pedal pulses, cold to touch, and purple in color. Necrotic ulcer over left heel. Labs and radiology as above. LLE with PAD, concern for foot ischemia. Vascular has seen the pt and is following the case. Plan for angiogram on Friday. Doppler shows mod disease in left SFA and no flow in left popliteal. Vascular does not want AC. Sepsis 2/2 left foot ulcer. Given IVFs in ED and started on IV ABXs (Vancomycin, Ceftriaxone, and Doxy). Elevated BP. Pt without hx of HTN. Start on Amlodipne 5 mg Daily. HAGMA + NAGMA. Likely 2/2 Uremia, renal insufficiency, and chronic diarrhea. Renal consult. Received IVFs in the ED. Pt is adamant about not wanting HD. SILVINA on CKD4. Renal consult. PT received IVFs in the ED. Repeat BMP in the AM. Urine lytes. Pr:Cr. Hyperkalemia. Will give kayex. Repeat BMP in the AM. Normocytic anemia, likely mixed. Pt denies any bleeding symptoms. Send Folate, B12, and iron studies. Hypomagnesemia. Will replete. Hypoalbuminemia 2/2 poor oral intake. Nutrition eval. Hx of hypothyroidism. Restart home meds. GI and DVT PPX. 58 YO M with a PMH of PVD s/p R AKA, paraplegia with multiple chronic bedsores and suprapubic catheter, HTN, CKD4, hypothyroidism, opioid dependance, and recent admission/intubation for unresponsiveness 2/2 hypoglycemia who was sent to the hospital by his PCP with a c/o L foot coldness and pain for the past x 3-4 days. Associated with left foot change in color and worsening of a left heel ulcer that is rapidly worsening. Denies any trauma or falls. In the ED, the pt was evaluated by vascular and had pulses that were difficult to palpate. A doppler was unable to detect pulses in posterior tib. Sent for Doppler US. Admit to medicine due to multiple comorbid conditions. Physical exam with multiple pressure ulcers over sacrum. Right AKA present. LLE with absent pedal pulses, cold to touch, and purple in color. Necrotic ulcer over left heel. Labs and radiology as above. LLE with PAD, concern for foot ischemia. Vascular has seen the pt and is following the case. Plan for angiogram on Friday. Doppler shows mod disease in left SFA and no flow in left popliteal. Vascular does not want AC. Sepsis 2/2 left foot ulcer. Given IVFs in ED and started on IV ABXs (Vancomycin, Ceftriaxone, and Doxy). Send lactate. Elevated BP. Pt without hx of HTN. Start on Amlodipne 5 mg Daily. HAGMA + NAGMA. Likely 2/2 Uremia, renal insufficiency, and chronic diarrhea. Renal consult. Received IVFs in the ED. Pt is adamant about not wanting HD. SILVINA on CKD4. Renal consult. PT received IVFs in the ED. Repeat BMP in the AM. Urine lytes. Pr:Cr. Hyperkalemia. Will give kayex. Repeat BMP in the AM. Normocytic anemia, likely mixed. Pt denies any bleeding symptoms. Send Folate, B12, and iron studies. Hypomagnesemia. Will replete. Hypoalbuminemia 2/2 poor oral intake. Nutrition eval. Hx of hypothyroidism. Restart home meds. GI and DVT PPX. 60 YO M with a PMH of PVD s/p R AKA, paraplegia with multiple chronic bedsores and suprapubic catheter, HTN, CKD4, hypothyroidism, opioid dependance, and recent admission/intubation for unresponsiveness 2/2 hypoglycemia who was sent to the hospital by his PCP with a c/o L foot coldness and pain for the past x 3-4 days. Associated with left foot change in color and worsening of a left heel ulcer that is rapidly worsening. Denies any trauma or falls. In the ED, the pt was evaluated by vascular and had pulses that were difficult to palpate. A doppler was unable to detect pulses in posterior tib. Sent for Doppler US. Admit to medicine due to multiple comorbid conditions. Physical exam with multiple pressure ulcers over sacrum. Right AKA present. LLE with absent pedal pulses, cold to touch, and purple in color. Necrotic ulcer over left heel. Labs and radiology as above. LLE with PAD, concern for foot ischemia. Vascular has seen the pt and is following the case. Plan for angiogram on Friday. Doppler shows mod disease in left SFA and no flow in left popliteal. Vascular does not want AC. Sepsis 2/2 left foot ulcer. Given IVFs in ED and started on IV ABXs (Vancomycin, Ceftriaxone, and Doxy). Send lactate. Elevated BP. Pt without hx of HTN. Start on Amlodipine 5 mg Daily. HAGMA + NAGMA. Likely 2/2 Uremia, renal insufficiency, and chronic diarrhea. Renal consult. Received IVFs in the ED. Pt is adamant about not wanting HD. SILVINA on CKD4. Renal consult. Pt received IVFs in the ED. STAT ABG. Repeat BMP in the AM. Urine lytes. Pr:Cr. Hyperkalemia. Will give kayex. Repeat BMP in the AM. Normocytic anemia, likely mixed. Pt denies any bleeding symptoms. Send Folate, B12, and iron studies. Hypomagnesemia. Will replete. Hypoalbuminemia 2/2 poor oral intake. Nutrition eval. Hx of hypothyroidism. Restart home meds. GI and DVT PPX.

## 2019-11-26 NOTE — ED PROVIDER NOTE - PROGRESS NOTE DETAILS
vasc aware of pt as per vascular resident; pt should be admitted to medicine, pt will need bka, but not urgent issue.  Undecided about coagulation.  Vascular will let medicine know.  SIgn out to medicine is to follow.

## 2019-11-26 NOTE — ED PROVIDER NOTE - PHYSICAL EXAMINATION
GENERAL: NAD, well-appearing, pale  CHEST/LUNG: Clear to auscultation bilaterally  HEART: Regular rate and rhythm  ABDOMEN: Soft, Nontender, Nondistended;   EXTREMITIES:  right BKA, multiple ulcerations and necrotic appearing area on great toe   PULSES: no signals in left foot

## 2019-11-26 NOTE — ED ADULT NURSE NOTE - OBJECTIVE STATEMENT
Pt A&OX4, wheelchair bound w/ R BKA, presents to ED with c/o coldness and decreased sensation to L foot. Pt has wounds to toe left foot and toes that has been ongoing since Saturday.

## 2019-11-26 NOTE — ED PROVIDER NOTE - NS ED ROS FT
Constitutional: (-) fever  Eyes/ENT: (-) blurry vision, (-) epistaxis  Cardiovascular: (-) chest pain, (-) syncope  Respiratory: (-) cough, (-) shortness of breath  Gastrointestinal: (-) vomiting, (-) diarrhea  Musculoskeletal: (-) neck pain, (-) back pain, (-) joint pain  Integumentary: (+ rash, (-) edema  Neurological: (-) headache, (-) altered mental status  Psychiatric: (-) hallucinations  Allergic/Immunologic: (-) pruritus

## 2019-11-26 NOTE — ED PROVIDER NOTE - OBJECTIVE STATEMENT
58y/o M w/ hx of CKD, anemia, HTN, PAD, R. BKA 5 months ago, L1 burst fx years ago,  pt with sacral ulcers hypothyroid presents for 3-4 days of l. foot coldness. pt went today to [facility] who sent pt here. no cp or sob. no fevers or chills. no vomiting or diarrhea.

## 2019-11-26 NOTE — H&P ADULT - NSHPPHYSICALEXAM_GEN_ALL_CORE
Vital Signs Last 24 Hrs  T(C): 35.6 (26 Nov 2019 17:13), Max: 35.6 (26 Nov 2019 17:13)  T(F): 96 (26 Nov 2019 17:13), Max: 96 (26 Nov 2019 17:13)  HR: 103 (26 Nov 2019 17:13) (103 - 103)  BP: 198/150 (26 Nov 2019 17:13) (198/150 - 198/150)  RR: 18 (26 Nov 2019 17:13) (18 - 18)  SpO2: 99% (26 Nov 2019 17:13) (99% - 99%)      PHYSICAL EXAM:  GENERAL: NAD, speaks in full sentences, no signs of respiratory distress, on room air  HEAD:  Atraumatic, Normocephalic  EYES: poorly injected conjunctiva and anicteric sclera  NECK: Supple  CHEST/LUNG: Clear to auscultation bilaterally; No wheeze; No crackles; No accessory muscles used  HEART: Regular rate and rhythm; No murmurs;   ABDOMEN: Soft, Nontender, Nondistended; No guarding; suprapubic catheter in place  EXTREMITIES:  s/p right BKA, left lower extremity with absent pedial pulses, chronic changes in left foot, necrotic ulcer over left heel.   PSYCH: AAOx3  NEUROLOGY: non-focal  SKIN: sacral bed sore stage 4

## 2019-11-26 NOTE — H&P ADULT - HISTORY OF PRESENT ILLNESS
Mr. Booker is a 58 yo male patient with Pmhx of PVD s/p R AKA, paraplegia with multiple chronic bedsores and suprapubic catheter, HTN (not on any medication), CKD stage IV (not following with nephrology), hypothyroidism, opioid dependance, recent admission due to unresponsiveness, presents for 3-4 days of left foot coldness, pain and worsening ulcers.    Patient went today to see Dr. Gooden who sent patient to ED. He noted that he has had left foot ulcers for the last 5 years, course was waxing and waning and was stable with dressing. Lately he felt his wound is getting worse and his foot felt cold. Denies any fever, chills. Patient denying any cp or sob. No GI or  complaints.    In ED, patient afebrile, tachycardic hypertensive. He was given 2L of LR, and one dose of vancomycin. Patient was seen by vascular surgery and arterial doppler US of the right LE was done. Patient will be admitted to the medical service.

## 2019-11-26 NOTE — ED PROVIDER NOTE - CLINICAL SUMMARY MEDICAL DECISION MAKING FREE TEXT BOX
pt with h/o PAD, s/p R bka now with cold foot x 3-4 days.  h/o CKD, creat 4.8 (base line 3's-4's), K+ 5.3.  hgb 7.7 - pt denies any blood in stool or dark stool.  seen and eval by vascular, will need amputation during admission, not needed to be done emergently, no AC for now.  pt admitted for continued evaluation and treatment.

## 2019-11-27 NOTE — PATIENT PROFILE ADULT - NSPROMUTANXFEARADDRESSFT_GEN_A_NUR
[facility], [address]    [address]    [address]    [facility] [address]    Phone:  [phone]    Fax:  [phone]       Thank You for choosing us for your health care visit. We are glad to serve you and happy to provide you with this summary of your visit. Please help us to ensure we have accurate records. If you find anything that needs to be changed, please let our staff know as soon as possible.          Your Demographic Information     Patient Name Sex Audelia Gomez Female 1951       Ethnic Group Patient Race    Not of  or  Origin White      Your Visit Details     Date & Time Provider Department    2017 3:30 PM Tanja Herbert MD [facility], [address]      Your Upcoming Appointment*(Max 10)     Friday March 10, 2017 10:00 AM CST   Follow-up Visit with Liliane Mitchell PA-C   [facility] ([facility])    [address] [facility] [address]   [phone]              2:00 PM CDT   Nurse Visit with [facility] UROLOGY NURSE   [place] [address] ([facility])    [address] [facility] [address]   [phone]            2017 10:00 AM CDT   Behavioral Health [facility]    [address] [address] [place] [id number]   [phone]            Thursday May 18, 2017  2:00 PM CDT   Follow-up Visit with Hortensia Fraser RD   [facility] ([facility])     [address] [facility] [address]   [phone]            2017  1:00 PM CDT   Follow-up Visit with Denise Galarza DPM   [facility] [place] [facility] ([facility])    [address] [facility]  [place] [id number] [phone]               Your To Do List     Future Orders Please Complete On or Around Expires    BASIC METABOLIC PANEL  Feb 21, 2017 Mar 23, 2017    GLYCOHEMOGLOBIN  Feb 21, 2017 Mar 23, 2017      We Ordered or Performed the Following     TETANUS/DIPHTHERIA/ACELLULAR PERTUSSIS 10+ (BOOSTRIX)       Conditions Discussed Today or Order-Related Diagnoses        Comments    Annual physical exam    -  Primary     Need for diphtheria-tetanus-pertussis (Tdap) vaccine         Type 2 diabetes mellitus with diabetic polyneuropathy, with long-term current use of insulin           Your Vitals Were     BP Pulse Height Weight BMI Smoking Status    112/68 83 5' 4\" (1.626 m) 180 lb (81.6 kg) 30.9 kg/m2 Never Smoker      Medications Prescribed or Re-Ordered Today     None      Your Current Medications Are        Disp Refills Start End    fluoxetine (PROZAC) 40 MG capsule 90 capsule 0 2/10/2017     Sig - Route: Take 1 capsule by mouth daily. Indications: depression - Oral    Class: Eprescribe    buPROPion (WELLBUTRIN SR) 150 MG 12 hr tablet 90 tablet 0 2/6/2017     Sig: TAKE 1 TABLET DAILY    Class: Eprescribe    blood glucose test strips (ONETOUCH VERIO) 100 strip 11 12/22/2016     Sig: Test blood sugar  daily as directed. Diagnosis: diabetes    Class: Eprescribe    Notes to Pharmacy: May dispense 90 day supply    fluticasone-salmeterol (ADVAIR DISKUS) 250-50 MCG/DOSE AEROSOL POWDER, BREATH ACTIVATED 60 each 3 10/18/2016     Sig - Route: Inhale 1 puff into the lungs two times daily. - Inhalation    Class: Eprescribe    Notes to Pharmacy: Ok to dispense 90 day supply if patient requests    Biotin 1 MG Cap        Class: Historical Med    Route: Oral    Multiple Vitamins-Minerals (MULTIVITAMIN ADULT PO)        Class: Historical Med    Route: Oral    lidocaine (LIDODERM) 5 % 90 patch 3 10/4/2016     Sig - Route: Place 1 patch onto the skin every 24 hours. Remove patch 12 hours after applying - Transdermal    Cosign for Ordering: Accepted by Denise  NATHALIE Galarza DPM on 10/4/2016 11:55 AM    pravastatin (PRAVACHOL) 40 MG tablet 90 tablet 0 2016     Sig - Route: Take 1 tablet by mouth daily. - Oral    Class: Eprescribe    Dulaglutide (TRULICITY) 0.75 MG/0.5ML Solution Pen-injector 12 Syringe 1 2016     Sig - Route: Inject 0.75 mg into the skin once a week. - Subcutaneous    Class: Eprescribe    montelukast (SINGULAIR) 10 MG tablet 90 tablet 3 2016     Sig - Route: Take 1 tablet by mouth daily. - Oral    Class: Eprescribe    VESICARE 5 MG tablet 90 tablet 2 2016     Sig: TAKE 1 TABLET DAILY    Class: Eprescribe    B-D U/F PEN NEEDLE \" 31G X 8 MM Misc 100 each 10 2016     Sig: USE TO ADMINISTER INSULIN    Class: Eprescribe    losartan-hydrochlorothiazide (HYZAAR) 100-25 MG per tablet 90 tablet 3 2014     Sig: TAKE 1 TABLET DAILY    Class: Eprescribe    Acetaminophen (ARTHRITIS PAIN RELIEF PO)        Sig - Route: Take 650 mg by mouth. - Oral    Class: Historical Med    VITAMIN D, CHOLECALCIFEROL, PO        Sig - Route: Take 5,000 Units by mouth. - Oral    Class: Historical Med    folic acid (FOLATE) 400 MCG tablet        Sig - Route: Take 400 mcg by mouth daily. - Oral    Class: Historical Med    Melatonin 1 MG TABS        Sig - Route: Take  by mouth. - Oral    Class: Historical Med    Coenzyme Q10 (COQ-10) 100 MG CAPS        Sig - Route: Take  by mouth. - Oral    Class: Historical Med    insulin aspart 70-30 (NOVOLOG MIX 70/30 FLEXPEN) (70-30) 100 UNIT/ML injection 45 mL 4 2013     Si units in am, 10 units in pm.    Class: Eprescribe    pantoprazole (PROTONIX) 40 MG tablet        Sig - Route: Take 40 mg by mouth 2 times daily.  - Oral    Class: Historical Med    ACCU-CHEK COMPACT TEST DRUM strip 306 strip 3 9/3/2013     Sig: USE AS DIRECTED 3 TIMES A  DAY    Class: Eprescribe    aspirin 325 MG tablet   1/15/2013     Sig - Route: Take 1 tablet by mouth daily. - Oral    Class: Historical Med      Discontinued Medications         Reason for Discontinue    COCONUT OIL PO Therapy Completed    Cyanocobalamin (VITAMIN B 12 PO) Therapy Completed      Allergies     Abilify Other (See Comments)    Unable to \"get my words out\"    Sulfa Antibiotics PRURITUS    Eye get  itchy    Grass     Mold     Ragweed     Trospium PRURITUS      Immunizations History as of 2/21/2017     Name Date    HERPES ZOSTER SHINGLES 12/11/2015    Hepatitis B Adult 10/18/1996, 6/21/1996, 4/19/1996    INFLUENZA QUADRIVALENT 11/2/2016    Influenza 11/1/2012, 9/29/2011, 10/12/2010    Pneumococcal Conjugate 13 Valent 1/12/2016    Pneumococcal Polysaccharide Adult 10/17/2013, 10/22/2001    Td:Adult type tetanus/diphtheria 1/20/2006      Problem List as of 2/21/2017     Type 2 diabetes mellitus with diabetic polyneuropathy    Dermatophytosis of nail    Pain in limb    Talipes cavus    Ingrowing nail    Achilles bursitis or tendinitis    Unspecified arthropathy, ankle and foot    DENVER (obstructive sleep apnea)    Glaucoma    Hypercholesteremia    Asthma    Hypertension    DM (diabetes mellitus)    Spinal stenosis    Abdominal pain, right upper quadrant    Corns and callosities    Depression            Patient Instructions     None       N/A

## 2019-11-27 NOTE — PATIENT PROFILE ADULT - DOES PATIENT HAVE ADVANCE DIRECTIVE
Telephone Encounter by Trupti Uribe RN at 04/21/18 09:53 AM     Author:  Trupti Uribe RN Service:  (none) Author Type:  Registered Nurse     Filed:  04/21/18 09:53 AM Encounter Date:  4/21/2018 Status:  Signed     :  Trupti Uribe RN (Registered Nurse)            Message left on machine to call back.[AC1.1T]         Revision History        User Key Date/Time User Provider Type Action    > AC1.1 04/21/18 09:53 AM Trupti Uribe RN Registered Nurse Sign    T - Template             no

## 2019-11-27 NOTE — PROGRESS NOTE ADULT - ASSESSMENT
Mr. Booker is a 60 yo male patient with PMHx of PVD s/p R AKA, paraplegia with multiple chronic bedsores and suprapubic catheter, HTN (not on any medication), CKD stage IV (not following with nephrology), hypothyroidism, opioid dependance, recent admission due to unresponsiveness, presents for 3-4 days of left foot coldness, pain and worsening ulcers.    # Chronic left foot peripheral artery disease + dry gangrene  - Patient with chronic wound of left foot with gangrenous changes.   - Doppler US showed Moderate disease in the left SFA, No flow noted in the left posterior tibial artery   - Patient was not on any antiplatelets or statin.   - Evaluated by vascular surgery, plan for angiogram on Friday  - Cardio consulted by vascular, patient for possible BKA on left.  - will keep on maintenance fluids    # Poor PO intake, difficulty swallowing  - Patient with occasional difficulty swallowing at home  - dysphagia while in the hospital  - will get Speech and Swallow eval    - Hypotension- possible sepsis  - will make diltiazem PRN  - (tachycardia + leukocytosis) on admission  - will make Valium PRN as patient's bp running low 80/50s today  - s/p 1 unit pRBC 11/27/19 and 250 cc NS bolus x 2   - Will avoid vancomycin given SILVINA on CKD and patient refusing dialysis.   - Will continue ceftriaxone + doxycycline.  - Does not show any clinical signs of hypoperfusion  - If patient continues to be hypotensive, consider ICU consult and start Levophed   - F/u CBC @4pm, f/u ABG    # SILVINA on CKD + HAGMA  - Patient with serum creatinine 4.8 up from 3.7  - S/p 2L of LR in ED  - Patient refusing dialysis, or following with nephrologist. Will obtain an evaluation by nephrology prior to angiogram  - ABG shows acidosis,   - on sodium bicarb  - Will continue calcium and vitamin D-  - started phoslo  - Consider LINDY and Epogen  - will repeat ABG at 4 pm    # Normocytic anemia  - Possibly due to CKD, and multifactorial  - Patient denies any melena, or hematochezia, noted history of hemorrhoids  - Will trend CBC    # HTN  - not on any medication  - will start amlodipine 5 mg daily    # Opioid dependance  - Patient on methadone 80 mg QID + Oxycontin 60 mg QID   - Verified in iSTOP, will continue home regimen    # Chronic sacral bedsore.  - Followed by Dr. Gooden    # Hypothyroidism  - Will continue levothyroxine 50 mcg daily Mr. Booker is a 60 yo male patient with PMHx of PVD s/p R AKA, paraplegia with multiple chronic bedsores and suprapubic catheter, HTN (not on any medication), CKD stage IV (not following with nephrology), hypothyroidism, opioid dependance, recent admission due to unresponsiveness, presents for 3-4 days of left foot coldness, pain and worsening ulcers.    # Chronic left foot peripheral artery disease + dry gangrene  - Patient with chronic wound of left foot with gangrenous changes.   - Doppler US showed Moderate disease in the left SFA, No flow noted in the left posterior tibial artery   - Patient was not on any antiplatelets or statin.   - Evaluated by vascular surgery, plan for angiogram on Friday  - Cardio consulted by vascular for, patient for possible L BKA on Monday .  - will keep on maintenance fluids, please hold off if O2 sat <92 percent      # Poor PO intake, difficulty swallowing  - Patient with occasional difficulty swallowing at home  - dysphagia while in the hospital  - will get Speech and Swallow eval    - Hypotension- possible sepsis  - will make diltiazem PRN  - (tachycardia + leukocytosis) on admission  - will make Valium PRN as patient's bp running low 80/50s today  - s/p 1 unit pRBC 11/27/19 and 250 cc NS bolus x 2   - Will avoid vancomycin given SILVINA on CKD and patient refusing dialysis.   - Will continue ceftriaxone + doxycycline.  - Does not show any clinical signs of hypoperfusion  - STAT CXR: Unremarkable, please f/u final read  - If patient continues to be hypotensive, consider ICU consult and start Levophed   - F/u CBC in am, f/u ABG    # SILVINA on CKD + HAGMA  - Patient with serum creatinine 4.8 up from 3.7  - Patient refusing dialysis, or following with nephrologist. Will obtain an evaluation by nephrology prior to angiogram  - ABG shows acidosis,   - on sodium bicarb  - Will continue calcium and vitamin D-  - started phoslo,  - Consider LINDY and Epogen  - s/p 250 cc boluses x 2, IV fluids, IV fluids up 90 cc an hour, with bicarb additive  - will repeat ABG and do a lactate, monitor for resolution of hagma  - f/u Phos, f/u Mag, f/u bmp    # Normocytic anemia  - Possibly due to CKD, and multifactorial  - s/p 1 unit pRBC this am  - Patient denies any melena, or hematochezia, noted history of hemorrhoids  - Will trend CBC    # HTN  - not on any medication  - off Amlodipine    # Opioid dependance  - Patient on methadone 80 mg QID + Oxycontin 60 mg QID   - Verified in iSTOP, will continue home regimen    # Chronic sacral bedsore.  - Followed by Dr. Gooden    # Hypothyroidism  - Will continue levothyroxine 50 mcg daily

## 2019-11-27 NOTE — PROGRESS NOTE ADULT - ATTENDING COMMENTS
60 yo male patient with PMHx of PVD s/p R AKA, paraplegia with multiple chronic bedsores and suprapubic catheter, HTN (not on any medication), CKD stage IV (not following with nephrology), hypothyroidism, opioid dependance, recent admission due to unresponsiveness, presents for 3-4 days of left foot coldness, pain and worsening ulcers.    #dry gangrene due to chronic left foot PAD  -pending angiogram by vascular and possible BKA  -cardio eval for pre-op optimization   -cont dressing changes  -unclear why pt was not on ASA or plavix after LE stent placement however, known to refuse intervention on prior admissions    #poor PO intake etiology unknown  -f/u speech and swallow  -antiemetic for nausea   -may need further studies when acute medical issues managed    #sepsis source unknown at this time  #hypotension persistent  leukocytosis resolved and afebrile   -cont ceftriaxone and doxy  -f/u bcx and UA/ucx  -IVF hydration (bolus as per nephro rec)  -if no improvement in hypotension after fluid bolus start levophed and ICU eval as pt has elevated chance of decompensation overnight     #SILVINA on CKD + HAGMA  -check lactate   -f/u nephro recs  -send urine studies   -cont bicarb drip  -cont phoslo  -repeat ABG in the afternoon  -trend BMP  -renally dose meds  -avoid nephrotoxic agents when possible  -change valium to PRN given hypotension and pt states that he takes it as needed     #Opioid dependance  -cont  methadone 80 mg QID + Oxycontin 60 mg QID    #Anemia likely FERNANDO  hgb 6.8   denies melena or BRBPR  -repeat CBC after 1 unit pRBC today  -check FOBT  -trend CBC and transfuse if hgb <7  -pt denies having colonoscopy in the past and given FERNANDO will need to follow up as out    #sacral ulcer  -cont wound care    #hypothyroidism  -check TSH  -cont levothyroxine 50mcg daily 60 yo male patient with PMHx of PVD s/p R AKA, paraplegia with multiple chronic bedsores and suprapubic catheter, HTN (not on any medication), CKD stage IV (not following with nephrology), hypothyroidism, opioid dependance, recent admission due to unresponsiveness, presents for 3-4 days of left foot coldness, pain and worsening ulcers.    #dry gangrene due to chronic left foot PAD  -pending angiogram by vascular and possible BKA  -cardio eval for pre-op optimization   -cont dressing changes  -unclear why pt was not on ASA or plavix after LE stent placement however, known to refuse intervention on prior admissions    #poor PO intake etiology unknown  -f/u speech and swallow  -antiemetic for nausea   -may need further studies when acute medical issues managed    #sepsis source unknown at this time  #hypotension persistent  leukocytosis resolved and afebrile   -cont ceftriaxone and doxy  -f/u bcx and UA/ucx  -IVF hydration (bolus as per nephro rec)  -if no improvement in hypotension after fluid bolus start levophed and ICU eval as pt has elevated chance of decompensation overnight     #SILVINA on CKD + HAGMA  -check lactate   -f/u nephro recs  -send urine studies   -cont bicarb drip  -cont phoslo  -repeat ABG in the afternoon  -trend BMP  -renally dose meds  -avoid nephrotoxic agents when possible  -change valium to PRN given hypotension and pt states that he takes it as needed     #Opioid dependance  -cont  methadone 80 mg QID + Oxycontin 60 mg QID    #Anemia likely FERNANDO  hgb 6.8   denies melena or BRBPR  -repeat CBC after 1 unit pRBC today  -check FOBT  -trend CBC and transfuse if hgb <7  -pt denies having colonoscopy in the past and given FERNANDO will need to follow up as out    #sacral ulcer  -cont wound care    #hypothyroidism  -check TSH  -cont levothyroxine 50mcg daily    Progress Note Handoff  Pending: vascular angiogram, cardiology eval, symptomatic improvement   Patient/Family discussion: spoke with pt at bedside for plan regarding angiogram he states he understands and denies any further concerns  Disposition: unclear acute at this time 60 yo male patient with PMHx of PVD s/p R AKA, paraplegia with multiple chronic bedsores and suprapubic catheter, HTN (not on any medication), CKD stage IV (not following with nephrology), hypothyroidism, opioid dependance, recent admission due to unresponsiveness, presents for 3-4 days of left foot coldness, pain and worsening ulcers.    #dry gangrene due to chronic left foot PAD  -pending angiogram by vascular and possible BKA  -cardio eval for pre-op optimization   -cont dressing changes  -unclear why pt was not on ASA or plavix after LE stent placement however, known to refuse intervention on prior admissions    #poor PO intake etiology unknown  -f/u speech and swallow  -antiemetic for nausea   -may need further studies when acute medical issues managed    #sepsis source unknown at this time  #hypotension persistent  leukocytosis resolved and afebrile   -cont ceftriaxone and doxy  -f/u bcx and UA/ucx  -IVF hydration (bolus as per nephro rec)  -if no improvement in hypotension after fluid bolus start levophed and ICU eval as pt has elevated chance of decompensation overnight     #SILVINA on CKD + HAGMA  -check lactate   -f/u nephro recs  -send urine studies   -cont bicarb drip  -cont phoslo  -repeat ABG in the afternoon  -trend BMP  -renally dose meds  -avoid nephrotoxic agents when possible  -change valium to PRN given hypotension and pt states that he takes it as needed     #Opioid dependance  -cont  methadone 80 mg QID + Oxycontin 60 mg QID    #Anemia likely FERNANDO  hgb 6.8   denies melena or BRBPR  -repeat CBC after 1 unit pRBC today  -check FOBT  -trend CBC and transfuse if hgb <7  -pt denies having colonoscopy in the past and given FERNANDO will need to follow up as out    #sacral ulcer  -cont wound care    #hypothyroidism  -check TSH  -cont levothyroxine 50mcg daily    #vitamin D deficiency   -cont supplementation     Progress Note Handoff  Pending: vascular angiogram, cardiology eval, symptomatic improvement   Patient/Family discussion: spoke with pt at bedside for plan regarding angiogram he states he understands and denies any further concerns  Disposition: unclear acute at this time

## 2019-11-27 NOTE — CONSULT NOTE ADULT - ASSESSMENT
Assessment     Mr. Booker is a 59 year male patient with PMHX of PVD s/p R AKA, paraplegia with multiple chronic bedsores and suprapubic catheter, HTN (not on any medication), CKD stage IV (not following with nephrology), hypothyroidism, opioid dependence, presented with 3-4 days of left foot coldness, pain and worsening ulcers. Pt with chronic left foot peripheral artery disease and dry gangrene- US showed Moderate disease in the left SFA and No flow noted in the left posterior tibial artery. Vascular angiogram scheduled for Friday.    # SILVINA on CKD stage 4 and HAGMA- chronic PADand dry gangrene/US-No flow in P tibial artery- scheduled for angiogram Friday  -Serum creatinine baseline 3.3- Creatinine Trend: 4.7 <--, 4.6 <--, 4.8 <--- S/p 2L of LR in ED  -Patient refusing dialysis- understand the consequences of not getting dialysis.   -High anion gap metabolic acidosis- pH 7.17, pCO2- 38, HCO3- 14, anion gap 17  -potassium 5.3-5.0-4.9.   -calcium- 8.0   -Low serum albumin 1.9  -Iron studies- Total iron 27, TIBC- 89, %saturation 30, phosphorus 8.3    Recommendations    # SILVINA on CKD stage 4 and high anion gap metabolic acidosis- scheduled for vascular angiogram- US- no flow P tibilal artery  -Please keep pt well hydrated prior to the procedure.   -Can consider IV N-acetylcysteine prior to the procedure  -Please order Biocarbonate drip and Sevelamer  -Low iron levels- can consider LINDY and Epogen  -Continue calcium and vit D  -Please order UA, urine lytes, PTH  -Please keep trending Cr, BMP, BUN  -Low serum Albumin- Recommend nutrition Evaluation  -Please keep monitoring I and O  -Avoid nephrotoxic drugs    Written by student. Need to confirm with Attending.******************************** Assessment     Mr. Booker is a 59 year male patient with PMHX of PVD s/p R AKA, paraplegia with multiple chronic bedsores and suprapubic catheter, HTN (not on any medication), CKD stage IV (not following with nephrology), hypothyroidism, opioid dependence, presented with 3-4 days of left foot coldness, pain and worsening ulcers. Pt with chronic left foot peripheral artery disease and dry gangrene- US showed Moderate disease in the left SFA and No flow noted in the left posterior tibial artery. Vascular angiogram scheduled for Friday.    # SILVINA on CKD stage 4 and HAGMA- chronic PADand dry gangrene/US-No flow in P tibial artery- scheduled for angiogram Friday  -Serum creatinine baseline 3.3- Creatinine Trend: 4.7 <--, 4.6 <--, 4.8 <--- S/p 2L of LR in ED  -Patient refusing dialysis- understand the consequences of not getting dialysis.   -High anion gap metabolic acidosis- pH 7.17, pCO2- 38, HCO3- 14, anion gap 17  -potassium 5.3-5.0-4.9.   -calcium- 8.0.   -Low serum albumin 1.9  -Iron studies- Total iron 27, TIBC- 89, %saturation 30, phosphorus 8.3    Recommendations    # SILVINA on CKD stage 4 and high anion gap metabolic acidosis- scheduled for vascular angiogram- US- no flow P tibilal artery  -Pt high risk for procedure  -Please keep pt well hydrated prior to the procedure and after procedure.  -Can consider IV N-acetylcysteine prior to the procedure  -Please order Biocarbonate drip and phoslo  -Low iron levels- can consider LINDY and Epogen  -Continue calcium and vit D  -Please order UA, urine lytes, PTH  -Please keep trending Cr, BMP, BUN  -Low serum Albumin- Recommend nutrition Evaluation  -Please keep monitoring I and O  -Avoid nephrotoxic drugs    Written by student. Need to confirm with Attending.******************************** Assessment     Mr. Booker is a 59 year male patient with PMHX of PVD s/p R AKA, paraplegia with multiple chronic bedsores and suprapubic catheter, HTN (not on any medication), CKD stage IV (not following with nephrology), hypothyroidism, opioid dependence, presented with 3-4 days of left foot coldness, pain and worsening ulcers. Pt with chronic left foot peripheral artery disease and dry gangrene- US showed Moderate disease in the left SFA and No flow noted in the left posterior tibial artery. Vascular angiogram scheduled for Friday.    # SILVINA on CKD stage 4 and HAGMA- chronic PADand dry gangrene/US-No flow in P tibial artery- scheduled for angiogram Friday  -Serum creatinine baseline 3.3- Creatinine Trend: 4.7 <--, 4.6 <--, 4.8 <--- S/p 2L of LR in ED  -Patient refusing dialysis- understand the consequences of not getting dialysis.   -High anion gap metabolic acidosis- pH 7.17, pCO2- 38, HCO3- 14, anion gap 17  -potassium 5.3-5.0-4.9.   -calcium- 8.0.   -Low serum albumin 1.9  -Iron studies- Total iron 27, TIBC- 89, %saturation 30, phosphorus 8.3    Recommendations    # SILVINA on CKD stage 4 and high anion gap metabolic acidosis- scheduled for vascular angiogram- US- no flow P tibilal artery  -Pt is high risk for contrast induced nephropathy  -Please keep pt well hydrated prior to the procedure, intraprocedure and after procedure.  -Please order Biocarbonate drip and Phoslo  -Low iron levels- can consider LINDY and Epogen  -Continue calcium and vit D  -Please order UA, urine lytes, PTH  -Please keep trending Cr, BMP, BUN  -Low serum Albumin- Recommend nutrition Evaluation  -Please keep monitoring I and O  -Avoid nephrotoxic drugs    Written by student. Need to confirm with Attending.********************************

## 2019-11-27 NOTE — PATIENT PROFILE ADULT - IS THERE A SUSPICION OF ABUSE/NEGLIGENCE?
Talked to Dr Ibarra regarding b/p and unable to to consistently read on monitor and having to doppler b/p.  He will talk to Dr Rojas to place [place].  Also discussed families wishes for a No code.  ORder received   no

## 2019-11-27 NOTE — CONSULT NOTE ADULT - SUBJECTIVE AND OBJECTIVE BOX
NEPHROLOGY CONSULTATION NOTE    THIS CONSULT IS INCOMPLETE / FULL CONSULT TO FOLLOW    Mr. Booker is a 59 year male patient with PMHX of PVD s/p R AKA, paraplegia with multiple chronic bedsores and suprapubic catheter, HTN (not on any medication), CKD stage IV (not following with  nephrology), hypothyroidism, opioid dependence, presented with 3-4 days of left foot coldness, pain and worsening ulcers. Patient was seen by vascular surgery and arterial Doppler US of the right LE was done. US showed Moderate disease in the left SFA and No flow noted in the left posterior tibial artery.   Pt with CKD stage 4, refusing hemodialysis despite explaining the need for dialysis. Pt understands the consequences of not being in the dialysis. Doesn’t follow nephrologist outpatient, but has multiple inpatient visits and seen nephrologist. Hg 6.8. Received 1 unit of PRBC. Cr- 4.8—4.6---4.7. High anion gap metabolic acidosis- pH 7.17, pCO2- 38, HCO3- 14, anion gap 17. Potassium 5.3-5.0-4.9. Low serum albumin 1.9.     Denies fever, chills, chest pain, palpitations, SOB, abdominal pain, any  complains. States he is slightly nauseous and tired from lack of sleep.         PAST MEDICAL & SURGICAL HISTORY:  Anemia  PAD (peripheral artery disease)  Hypertension  Suprapubic catheter  Pressure ulcer  Diabetes  Lumbar compression fracture  S/P below knee amputation, right  S/P debridement  Toe amputation status, right  H/O hernia repair    Allergies:  sulfamethizole (Unknown)    Home Medications Reviewed  Hospital Medications:   MEDICATIONS  (STANDING):  amLODIPine   Tablet 5 milliGRAM(s) Oral daily  ascorbic acid 500 milliGRAM(s) Oral daily  calcium acetate 667 milliGRAM(s) Oral three times a day with meals  calcium carbonate    500 mG (Tums) Chewable 1 Tablet(s) Chew every 8 hours  cefTRIAXone   IVPB 1000 milliGRAM(s) IV Intermittent every 24 hours  chlorhexidine 4% Liquid 1 Application(s) Topical <User Schedule>  cholecalciferol 1000 Unit(s) Oral daily  dextrose 5% 1000 milliLiter(s) (50 mL/Hr) IV Continuous <Continuous>  diazepam    Tablet 10 milliGRAM(s) Oral two times a day  doxycycline hyclate Capsule 100 milliGRAM(s) Oral every 12 hours  heparin  Injectable 5000 Unit(s) SubCutaneous every 12 hours  levothyroxine 50 MICROGram(s) Oral daily  methadone    Tablet 80 milliGRAM(s) Oral every 6 hours  ondansetron Injectable 4 milliGRAM(s) IV Push once  oxybutynin 10 milliGRAM(s) Oral two times a day  oxyCODONE  ER Tablet 60 milliGRAM(s) Oral three times a day  pantoprazole    Tablet 40 milliGRAM(s) Oral before breakfast      SOCIAL HISTORY:  Denies ETOH,Smoking,   FAMILY HISTORY:  No pertinent family history in first degree relatives        REVIEW OF SYSTEMS:  CONSTITUTIONAL: No weakness, fevers or chills  NECK: No pain or stiffness  RESPIRATORY: No cough, wheezing or shortness of breath  CARDIOVASCULAR: No chest pain or palpitations.  GASTROINTESTINAL: No abdominal pain. + nausea,  No diarrhea or constipation. No melena or hematochezia.  NEUROLOGICAL: No numbness or weakness  SKIN: No itching, burning, rashes, or lesions   VASCULAR: R AKA   All other review of systems is negative unless indicated above.    VITALS:  T(F): 96.4 (11-27-19 @ 04:30), Max: 96.4 (11-27-19 @ 04:30)  HR: 88 (11-27-19 @ 05:40)  BP: 100/54 (11-27-19 @ 05:40)  RR: 18 (11-27-19 @ 04:30)  SpO2: 99% (11-26-19 @ 17:13)    11-26 @ 07:01  -  11-27 @ 07:00  --------------------------------------------------------  IN: 0 mL / OUT: 1050 mL / NET: -1050 mL      Height (cm): 185.4 (11-27 @ 02:00)  Weight (kg): 63.4 (11-27 @ 02:00)  BMI (kg/m2): 18.4 (11-27 @ 02:00)  BSA (m2): 1.85 (11-27 @ 02:00)      I&O's Detail    26 Nov 2019 07:01  -  27 Nov 2019 07:00  --------------------------------------------------------  IN:  Total IN: 0 mL    OUT:    Voided: 1050 mL  Total OUT: 1050 mL    Total NET: -1050 mL            PHYSICAL EXAM:  Constitutional: NAD  HEENT: anicteric sclera, oropharynx clear, MMM  Neck: No JVD  Respiratory: CTAB, no wheezes, rales or rhonchi  Cardiovascular: S1, S2, RRR  Gastrointestinal: BS+, soft, NT/ND  Extremities: No cyanosis or clubbing. No peripheral edema. R AKA  Neurological: A/O x 3, no focal deficits  Psychiatric: Normal mood, normal affect  : No CVA tenderness. Suprapubic brown   Skin: No rashes  Vascular Access:    LABS:  11-27    135  |  104  |  68<HH>  ----------------------------<  72  4.9   |  14<L>  |  4.7<HH>    Ca    7.7<L>      27 Nov 2019 06:17  Phos  8.3     11-27  Mg     1.7     11-27    TPro  4.8<L>  /  Alb  1.7<L>  /  TBili  <0.2  /  DBili      /  AST  16  /  ALT  15  /  AlkPhos  163<H>  11-27    Creatinine Trend: 4.7 <--, 4.6 <--, 4.8 <--                        6.8    9.30  )-----------( 356      ( 27 Nov 2019 06:17 )             21.8     Urine Studies:              RADIOLOGY & ADDITIONAL STUDIES:    < from: [facility] Duplex Low Ext Arterial, Ltd, Left (11.26.19 @ 18:55) >  INTERPRETATION:  Clinical History / Reason for exam: This patient is a   59-year-old male with left lower extremity wound. Lower extremity   arterial duplex ultrasound was performed to assess for arterial occlusive   disease.    Left common femoral, deep femoral, superficial femoral, popliteal,   anterior tibial, posterior tibial and peroneal arteries were visualized.   Left posterior tibial is occluded   Moderate Disease noted in the left SFA       Impression:      1. Moderate disease in the left SFA.  2. No flow noted in the left posterior tibial artery   ICD-10:I70.222    < end of copied text > NEPHROLOGY CONSULTATION NOTE    THIS CONSULT IS INCOMPLETE / FULL CONSULT TO FOLLOW    Mr. Booker is a 59 year male patient with PMHX of PVD s/p R AKA, paraplegia with multiple chronic bedsores and suprapubic catheter, HTN (not on any medication), CKD stage IV (not following with  nephrology), hypothyroidism, opioid dependence, presented with 3-4 days of left foot coldness, pain and worsening ulcers. Patient was seen by vascular surgery and arterial Doppler US of the right LE was done. US showed Moderate disease in the left SFA and No flow noted in the left posterior tibial artery.   Pt with CKD stage 4, refusing hemodialysis despite explaining the need for dialysis. Pt understands the consequences of not being in the dialysis. Doesn’t follow nephrologist outpatient.  Hg 6.8. Received 1 unit of PRBC. Cr- 4.8—4.6---4.7. High anion gap metabolic acidosis- pH 7.17, pCO2- 38, HCO3- 14, anion gap 17. Potassium 5.3-5.0-4.9. Low serum albumin 1.9.     Denies fever, chills, chest pain, palpitations, SOB, abdominal pain, any  complains. States he is slightly nauseous and tired from lack of sleep.         PAST MEDICAL & SURGICAL HISTORY:  Anemia  PAD (peripheral artery disease)  Hypertension  Suprapubic catheter  Pressure ulcer  Diabetes  Lumbar compression fracture  S/P below knee amputation, right  S/P debridement  Toe amputation status, right  H/O hernia repair    Allergies:  sulfamethizole (Unknown)    Home Medications Reviewed  Hospital Medications:   MEDICATIONS  (STANDING):  amLODIPine   Tablet 5 milliGRAM(s) Oral daily  ascorbic acid 500 milliGRAM(s) Oral daily  calcium acetate 667 milliGRAM(s) Oral three times a day with meals  calcium carbonate    500 mG (Tums) Chewable 1 Tablet(s) Chew every 8 hours  cefTRIAXone   IVPB 1000 milliGRAM(s) IV Intermittent every 24 hours  chlorhexidine 4% Liquid 1 Application(s) Topical <User Schedule>  cholecalciferol 1000 Unit(s) Oral daily  dextrose 5% 1000 milliLiter(s) (50 mL/Hr) IV Continuous <Continuous>  diazepam    Tablet 10 milliGRAM(s) Oral two times a day  doxycycline hyclate Capsule 100 milliGRAM(s) Oral every 12 hours  heparin  Injectable 5000 Unit(s) SubCutaneous every 12 hours  levothyroxine 50 MICROGram(s) Oral daily  methadone    Tablet 80 milliGRAM(s) Oral every 6 hours  ondansetron Injectable 4 milliGRAM(s) IV Push once  oxybutynin 10 milliGRAM(s) Oral two times a day  oxyCODONE  ER Tablet 60 milliGRAM(s) Oral three times a day  pantoprazole    Tablet 40 milliGRAM(s) Oral before breakfast      SOCIAL HISTORY:  Denies ETOH,Smoking,   FAMILY HISTORY:  No pertinent family history in first degree relatives        REVIEW OF SYSTEMS:  CONSTITUTIONAL: No weakness, fevers or chills  NECK: No pain or stiffness  RESPIRATORY: No cough, wheezing or shortness of breath  CARDIOVASCULAR: No chest pain or palpitations.  GASTROINTESTINAL: No abdominal pain. + nausea,  No diarrhea or constipation. No melena or hematochezia.  NEUROLOGICAL: No numbness or weakness  SKIN: No itching, burning, rashes, or lesions   VASCULAR: R AKA   All other review of systems is negative unless indicated above.    VITALS:  T(F): 96.4 (11-27-19 @ 04:30), Max: 96.4 (11-27-19 @ 04:30)  HR: 88 (11-27-19 @ 05:40)  BP: 100/54 (11-27-19 @ 05:40)  RR: 18 (11-27-19 @ 04:30)  SpO2: 99% (11-26-19 @ 17:13)    11-26 @ 07:01  -  11-27 @ 07:00  --------------------------------------------------------  IN: 0 mL / OUT: 1050 mL / NET: -1050 mL      Height (cm): 185.4 (11-27 @ 02:00)  Weight (kg): 63.4 (11-27 @ 02:00)  BMI (kg/m2): 18.4 (11-27 @ 02:00)  BSA (m2): 1.85 (11-27 @ 02:00)      I&O's Detail    26 Nov 2019 07:01  -  27 Nov 2019 07:00  --------------------------------------------------------  IN:  Total IN: 0 mL    OUT:    Voided: 1050 mL  Total OUT: 1050 mL    Total NET: -1050 mL            PHYSICAL EXAM:  Constitutional: NAD  HEENT: anicteric sclera, oropharynx clear, MMM  Neck: No JVD  Respiratory: CTAB, no wheezes, rales or rhonchi  Cardiovascular: S1, S2, RRR  Gastrointestinal: BS+, soft, NT/ND  Extremities: No cyanosis or clubbing. No peripheral edema. R AKA  Neurological: A/O x 3, no focal deficits  Psychiatric: Normal mood, normal affect  : No CVA tenderness. Suprapubic catheter  Skin: No rashes  Vascular Access:    LABS:  11-27    135  |  104  |  68<HH>  ----------------------------<  72  4.9   |  14<L>  |  4.7<HH>    Ca    7.7<L>      27 Nov 2019 06:17  Phos  8.3     11-27  Mg     1.7     11-27    TPro  4.8<L>  /  Alb  1.7<L>  /  TBili  <0.2  /  DBili      /  AST  16  /  ALT  15  /  AlkPhos  163<H>  11-27    Creatinine Trend: 4.7 <--, 4.6 <--, 4.8 <--                        6.8    9.30  )-----------( 356      ( 27 Nov 2019 06:17 )             21.8     Urine Studies:              RADIOLOGY & ADDITIONAL STUDIES:    < from: [facility] Duplex Low Ext Arterial, Ltd, Left (11.26.19 @ 18:55) >  INTERPRETATION:  Clinical History / Reason for exam: This patient is a   59-year-old male with left lower extremity wound. Lower extremity   arterial duplex ultrasound was performed to assess for arterial occlusive   disease.    Left common femoral, deep femoral, superficial femoral, popliteal,   anterior tibial, posterior tibial and peroneal arteries were visualized.   Left posterior tibial is occluded   Moderate Disease noted in the left SFA       Impression:      1. Moderate disease in the left SFA.  2. No flow noted in the left posterior tibial artery   ICD-10:I70.222    < end of copied text > NEPHROLOGY CONSULTATION NOTE    THIS CONSULT IS INCOMPLETE / FULL CONSULT TO FOLLOW    Mr. Booker is a 59 year male patient with PMHX of PVD s/p R AKA, paraplegia with multiple chronic bedsores and suprapubic catheter, HTN (not on any medication), CKD stage IV (not following with nephrology), hypothyroidism, opioid dependence, presented with 3-4 days of left foot coldness, pain and worsening ulcers. Patient was seen by vascular surgery and arterial Doppler US of the right LE was done. US showed Moderate disease in the left SFA and No flow noted in the left posterior tibial artery. Scheduled for vascular angiogram on Friday.   Pt with CKD stage 4, refusing hemodialysis despite explaining the need for dialysis. Pt understands the consequences of not being in the dialysis. Doesn’t follow nephrologist outpatient, but has multiple inpatient visits and seen nephrologist. Hg 6.8. Received 1 unit of PRBC. Cr- 4.8—4.6---4.7. High anion gap metabolic acidosis- pH 7.17, pCO2- 38, HCO3- 14, anion gap 17. potassium 5.3-5.0-4.9. Low serum albumin 1.9.     Denies fever, chills, chest pain, palpitations, SOB, abdominal pain, any  complains. States he is slightly nauseous and tired from lack of sleep.          PAST MEDICAL & SURGICAL HISTORY:  Anemia  PAD (peripheral artery disease)  Hypertension  Suprapubic catheter  Pressure ulcer  Diabetes  Lumbar compression fracture  S/P below knee amputation, right  S/P debridement  Toe amputation status, right  H/O hernia repair    Allergies:  sulfamethizole (Unknown)    Home Medications Reviewed  Hospital Medications:   MEDICATIONS  (STANDING):  amLODIPine   Tablet 5 milliGRAM(s) Oral daily  ascorbic acid 500 milliGRAM(s) Oral daily  calcium acetate 667 milliGRAM(s) Oral three times a day with meals  calcium carbonate    500 mG (Tums) Chewable 1 Tablet(s) Chew every 8 hours  cefTRIAXone   IVPB 1000 milliGRAM(s) IV Intermittent every 24 hours  chlorhexidine 4% Liquid 1 Application(s) Topical <User Schedule>  cholecalciferol 1000 Unit(s) Oral daily  dextrose 5% 1000 milliLiter(s) (50 mL/Hr) IV Continuous <Continuous>  diazepam    Tablet 10 milliGRAM(s) Oral two times a day  doxycycline hyclate Capsule 100 milliGRAM(s) Oral every 12 hours  heparin  Injectable 5000 Unit(s) SubCutaneous every 12 hours  levothyroxine 50 MICROGram(s) Oral daily  methadone    Tablet 80 milliGRAM(s) Oral every 6 hours  ondansetron Injectable 4 milliGRAM(s) IV Push once  oxybutynin 10 milliGRAM(s) Oral two times a day  oxyCODONE  ER Tablet 60 milliGRAM(s) Oral three times a day  pantoprazole    Tablet 40 milliGRAM(s) Oral before breakfast      SOCIAL HISTORY:  Denies ETOH,Smoking,   FAMILY HISTORY:  No pertinent family history in first degree relatives        REVIEW OF SYSTEMS:  CONSTITUTIONAL: No weakness, fevers or chills  NECK: No pain or stiffness  RESPIRATORY: No cough, wheezing or shortness of breath  CARDIOVASCULAR: No chest pain or palpitations.  GASTROINTESTINAL: No abdominal pain. + nausea,  No diarrhea or constipation. No melena or hematochezia.  NEUROLOGICAL: No numbness or weakness  SKIN: No itching, burning, rashes, or lesions   VASCULAR: R AKA   All other review of systems is negative unless indicated above.    VITALS:  T(F): 96.4 (11-27-19 @ 04:30), Max: 96.4 (11-27-19 @ 04:30)  HR: 88 (11-27-19 @ 05:40)  BP: 100/54 (11-27-19 @ 05:40)  RR: 18 (11-27-19 @ 04:30)  SpO2: 99% (11-26-19 @ 17:13)    11-26 @ 07:01  -  11-27 @ 07:00  --------------------------------------------------------  IN: 0 mL / OUT: 1050 mL / NET: -1050 mL      Height (cm): 185.4 (11-27 @ 02:00)  Weight (kg): 63.4 (11-27 @ 02:00)  BMI (kg/m2): 18.4 (11-27 @ 02:00)  BSA (m2): 1.85 (11-27 @ 02:00)      I&O's Detail    26 Nov 2019 07:01  -  27 Nov 2019 07:00  --------------------------------------------------------  IN:  Total IN: 0 mL    OUT:    Voided: 1050 mL  Total OUT: 1050 mL    Total NET: -1050 mL            PHYSICAL EXAM:  Constitutional: NAD  HEENT: anicteric sclera, oropharynx clear, MMM  Neck: No JVD  Respiratory: CTAB, no wheezes, rales or rhonchi  Cardiovascular: S1, S2, RRR  Gastrointestinal: BS+, soft, NT/ND  Extremities: No cyanosis or clubbing. No peripheral edema. R AKA  Neurological: A/O x 3, no focal deficits  Psychiatric: Normal mood, normal affect  : No CVA tenderness. Suprapubic catheter  Skin: No rashes  Vascular Access:    LABS:  11-27    135  |  104  |  68<HH>  ----------------------------<  72  4.9   |  14<L>  |  4.7<HH>    Ca    7.7<L>      27 Nov 2019 06:17  Phos  8.3     11-27  Mg     1.7     11-27    TPro  4.8<L>  /  Alb  1.7<L>  /  TBili  <0.2  /  DBili      /  AST  16  /  ALT  15  /  AlkPhos  163<H>  11-27    Creatinine Trend: 4.7 <--, 4.6 <--, 4.8 <--                        6.8    9.30  )-----------( 356      ( 27 Nov 2019 06:17 )             21.8     Urine Studies:              RADIOLOGY & ADDITIONAL STUDIES:    < from: [facility] Duplex Low Ext Arterial, Ltd, Left (11.26.19 @ 18:55) >  INTERPRETATION:  Clinical History / Reason for exam: This patient is a   59-year-old male with left lower extremity wound. Lower extremity   arterial duplex ultrasound was performed to assess for arterial occlusive   disease.    Left common femoral, deep femoral, superficial femoral, popliteal,   anterior tibial, posterior tibial and peroneal arteries were visualized.   Left posterior tibial is occluded   Moderate Disease noted in the left SFA       Impression:      1. Moderate disease in the left SFA.  2. No flow noted in the left posterior tibial artery   ICD-10:I70.222    < end of copied text > NEPHROLOGY CONSULTATION NOTE    THIS CONSULT IS INCOMPLETE / FULL CONSULT TO FOLLOW    Mr. Booker is a 59 year male patient with PMHX of PVD s/p R AKA, paraplegia with multiple chronic bedsores and suprapubic catheter, HTN (not on any medication), CKD stage IV (not following with nephrology), hypothyroidism, opioid dependence, presented with 3-4 days of left foot coldness, pain and worsening ulcers. Patient was seen by vascular surgery and arterial Doppler US of the right LE was done. US showed Moderate disease in the left SFA and No flow noted in the left posterior tibial artery. Scheduled for vascular angiogram on Friday.   Pt with CKD stage 4 has suprapubic catheter. Hg 6.8. Received 1 unit of PRBC. Creatinine Trend: 4.7 <--, 4.6 <--, 4.8 <--. High anion gap metabolic acidosis- pH 7.17, pCO2- 38, HCO3- 14, anion gap 17. Potassium 5.3-->5.0-->4.9 today. Low serum albumin 1.9.  Doesn’t follow nephrologist outpatient. Pt is aware of his kidney disease and stated that he doesn't want to follow a nephrologist and doesn't want hemodialysis. He states he understands the consequences of not being on the dialysis.   Denies fever, chills, chest pain, palpitations, SOB, abdominal pain, any  complains. States he is slightly nauseous and tired from lack of sleep.          PAST MEDICAL & SURGICAL HISTORY:  Anemia  PAD (peripheral artery disease)  Hypertension  Suprapubic catheter  Pressure ulcer  Diabetes  Lumbar compression fracture  S/P below knee amputation, right  S/P debridement  Toe amputation status, right  H/O hernia repair    Allergies:  sulfamethizole (Unknown)    Home Medications Reviewed  Hospital Medications:   MEDICATIONS  (STANDING):  amLODIPine   Tablet 5 milliGRAM(s) Oral daily  ascorbic acid 500 milliGRAM(s) Oral daily  calcium acetate 667 milliGRAM(s) Oral three times a day with meals  calcium carbonate    500 mG (Tums) Chewable 1 Tablet(s) Chew every 8 hours  cefTRIAXone   IVPB 1000 milliGRAM(s) IV Intermittent every 24 hours  chlorhexidine 4% Liquid 1 Application(s) Topical <User Schedule>  cholecalciferol 1000 Unit(s) Oral daily  dextrose 5% 1000 milliLiter(s) (50 mL/Hr) IV Continuous <Continuous>  diazepam    Tablet 10 milliGRAM(s) Oral two times a day  doxycycline hyclate Capsule 100 milliGRAM(s) Oral every 12 hours  heparin  Injectable 5000 Unit(s) SubCutaneous every 12 hours  levothyroxine 50 MICROGram(s) Oral daily  methadone    Tablet 80 milliGRAM(s) Oral every 6 hours  ondansetron Injectable 4 milliGRAM(s) IV Push once  oxybutynin 10 milliGRAM(s) Oral two times a day  oxyCODONE  ER Tablet 60 milliGRAM(s) Oral three times a day  pantoprazole    Tablet 40 milliGRAM(s) Oral before breakfast      SOCIAL HISTORY:  Denies ETOH,Smoking,     FAMILY HISTORY:  No pertinent family history in first degree relatives        REVIEW OF SYSTEMS:  CONSTITUTIONAL: No weakness, fevers or chills  NECK: No pain or stiffness  RESPIRATORY: No cough, wheezing or shortness of breath  CARDIOVASCULAR: No chest pain or palpitations.  GASTROINTESTINAL: No abdominal pain. + nausea,  No diarrhea or constipation. No melena or hematochezia.  NEUROLOGICAL: No numbness or weakness  SKIN: No itching, burning, rashes, or lesions   VASCULAR: R AKA   All other review of systems is negative unless indicated above.    VITALS:  T(F): 96.4 (11-27-19 @ 04:30), Max: 96.4 (11-27-19 @ 04:30)  HR: 88 (11-27-19 @ 05:40)  BP: 100/54 (11-27-19 @ 05:40)  RR: 18 (11-27-19 @ 04:30)  SpO2: 99% (11-26-19 @ 17:13)    11-26 @ 07:01  -  11-27 @ 07:00  --------------------------------------------------------  IN: 0 mL / OUT: 1050 mL / NET: -1050 mL      Height (cm): 185.4 (11-27 @ 02:00)  Weight (kg): 63.4 (11-27 @ 02:00)  BMI (kg/m2): 18.4 (11-27 @ 02:00)  BSA (m2): 1.85 (11-27 @ 02:00)      I&O's Detail    26 Nov 2019 07:01  -  27 Nov 2019 07:00  --------------------------------------------------------  IN:  Total IN: 0 mL    OUT:    Voided: 1050 mL  Total OUT: 1050 mL    Total NET: -1050 mL            PHYSICAL EXAM:  Constitutional: NAD  HEENT: anicteric sclera, oropharynx clear, MMM  Neck: No JVD  Respiratory: CTAB, no wheezes, rales or rhonchi  Cardiovascular: S1, S2, RRR  Gastrointestinal: BS+, soft, NT/ND  Extremities: No cyanosis or clubbing. No peripheral edema. R AKA. Has dressing on left foot.  Necrotic appearing area on great toe.   Neurological: A/O x 3, no focal deficits  Psychiatric: Normal mood, normal affect  : No CVA tenderness. Suprapubic catheter  Skin: No rashes  Vascular Access:    LABS:  11-27    135  |  104  |  68<HH>  ----------------------------<  72  4.9   |  14<L>  |  4.7<HH>    Ca    7.7<L>      27 Nov 2019 06:17  Phos  8.3     11-27  Mg     1.7     11-27    TPro  4.8<L>  /  Alb  1.7<L>  /  TBili  <0.2  /  DBili      /  AST  16  /  ALT  15  /  AlkPhos  163<H>  11-27    Creatinine Trend: 4.7 <--, 4.6 <--, 4.8 <--                        6.8    9.30  )-----------( 356      ( 27 Nov 2019 06:17 )             21.8     Urine Studies:              RADIOLOGY & ADDITIONAL STUDIES:    < from: VA Duplex Low Ext Arterial, Ltd, Left (11.26.19 @ 18:55) >  INTERPRETATION:  Clinical History / Reason for exam: This patient is a   59-year-old male with left lower extremity wound. Lower extremity   arterial duplex ultrasound was performed to assess for arterial occlusive   disease.    Left common femoral, deep femoral, superficial femoral, popliteal,   anterior tibial, posterior tibial and peroneal arteries were visualized.   Left posterior tibial is occluded   Moderate Disease noted in the left SFA       Impression:      1. Moderate disease in the left SFA.  2. No flow noted in the left posterior tibial artery   ICD-10:I70.222    < end of copied text > NEPHROLOGY CONSULTATION NOTE      Mr. Booker is a 59 year male patient with PMHX of PVD s/p R AKA, paraplegia with multiple chronic bedsores and suprapubic catheter, HTN (not on any medication), CKD stage IV (not following with nephrology), hypothyroidism, opioid dependence, presented with 3-4 days of left foot coldness, pain and worsening ulcers. Patient was seen by vascular surgery and arterial Doppler US of the right LE was done. US showed Moderate disease in the left SFA and No flow noted in the left posterior tibial artery. Scheduled for vascular angiogram on Friday.   Pt with CKD stage 4 has suprapubic catheter. Hg 6.8. Received 1 unit of PRBC. Creatinine Trend: 4.7 <--, 4.6 <--, 4.8 <--. High anion gap metabolic acidosis- pH 7.17, pCO2- 38, HCO3- 14, anion gap 17. Potassium 5.3-->5.0-->4.9 today. Low serum albumin 1.9.  Doesn’t follow nephrologist outpatient. Pt is aware of his kidney disease and stated that he doesn't want to follow a nephrologist and doesn't want hemodialysis. He states he understands the consequences of not being on the dialysis.   Denies fever, chills, chest pain, palpitations, SOB, abdominal pain, any  complains. States he is slightly nauseous and tired from lack of sleep.          PAST MEDICAL & SURGICAL HISTORY:  Anemia  PAD (peripheral artery disease)  Hypertension  Suprapubic catheter  Pressure ulcer  Diabetes  Lumbar compression fracture  S/P below knee amputation, right  S/P debridement  Toe amputation status, right  H/O hernia repair    Allergies:  sulfamethizole (Unknown)    Home Medications Reviewed  Hospital Medications:   MEDICATIONS  (STANDING):  amLODIPine   Tablet 5 milliGRAM(s) Oral daily  ascorbic acid 500 milliGRAM(s) Oral daily  calcium acetate 667 milliGRAM(s) Oral three times a day with meals  calcium carbonate    500 mG (Tums) Chewable 1 Tablet(s) Chew every 8 hours  cefTRIAXone   IVPB 1000 milliGRAM(s) IV Intermittent every 24 hours  chlorhexidine 4% Liquid 1 Application(s) Topical <User Schedule>  cholecalciferol 1000 Unit(s) Oral daily  dextrose 5% 1000 milliLiter(s) (50 mL/Hr) IV Continuous <Continuous>  diazepam    Tablet 10 milliGRAM(s) Oral two times a day  doxycycline hyclate Capsule 100 milliGRAM(s) Oral every 12 hours  heparin  Injectable 5000 Unit(s) SubCutaneous every 12 hours  levothyroxine 50 MICROGram(s) Oral daily  methadone    Tablet 80 milliGRAM(s) Oral every 6 hours  ondansetron Injectable 4 milliGRAM(s) IV Push once  oxybutynin 10 milliGRAM(s) Oral two times a day  oxyCODONE  ER Tablet 60 milliGRAM(s) Oral three times a day  pantoprazole    Tablet 40 milliGRAM(s) Oral before breakfast      SOCIAL HISTORY:  Denies ETOH,Smoking,     FAMILY HISTORY:  No pertinent family history in first degree relatives        REVIEW OF SYSTEMS:  CONSTITUTIONAL: No weakness, fevers or chills  NECK: No pain or stiffness  RESPIRATORY: No cough, wheezing or shortness of breath  CARDIOVASCULAR: No chest pain or palpitations.  GASTROINTESTINAL: No abdominal pain. + nausea,  No diarrhea or constipation. No melena or hematochezia. Has dysphagia, diff swallowing no appetite  NEUROLOGICAL: No numbness or weakness  SKIN: No itching, burning, rashes, or lesions   VASCULAR: R AKA   All other review of systems is negative unless indicated above.    VITALS:  T(F): 96.4 (11-27-19 @ 04:30), Max: 96.4 (11-27-19 @ 04:30)  HR: 88 (11-27-19 @ 05:40)  BP: 100/54 (11-27-19 @ 05:40)  RR: 18 (11-27-19 @ 04:30)  SpO2: 99% (11-26-19 @ 17:13)    11-26 @ 07:01  -  11-27 @ 07:00  --------------------------------------------------------  IN: 0 mL / OUT: 1050 mL / NET: -1050 mL      Height (cm): 185.4 (11-27 @ 02:00)  Weight (kg): 63.4 (11-27 @ 02:00)  BMI (kg/m2): 18.4 (11-27 @ 02:00)  BSA (m2): 1.85 (11-27 @ 02:00)      I&O's Detail    26 Nov 2019 07:01  -  27 Nov 2019 07:00  --------------------------------------------------------  IN:  Total IN: 0 mL    OUT:    Voided: 1050 mL  Total OUT: 1050 mL    Total NET: -1050 mL            PHYSICAL EXAM:  Constitutional: NAD  HEENT: anicteric sclera, oropharynx clear, MMM  Neck: No JVD  Respiratory: CTAB, no wheezes, rales or rhonchi  Cardiovascular: S1, S2, RRR  Gastrointestinal: BS+, soft, NT/ND  Extremities: No cyanosis or clubbing. No peripheral edema. R AKA. Has dressing on left foot.  Necrotic appearing area on great toe.   Neurological: A/O x 3, no focal deficits  Psychiatric: Normal mood, normal affect  : No CVA tenderness. Suprapubic catheter  Skin: No rashes  Vascular Access:    LABS:  11-27    135  |  104  |  68<HH>  ----------------------------<  72  4.9   |  14<L>  |  4.7<HH>    Ca    7.7<L>      27 Nov 2019 06:17  Phos  8.3     11-27  Mg     1.7     11-27    TPro  4.8<L>  /  Alb  1.7<L>  /  TBili  <0.2  /  DBili      /  AST  16  /  ALT  15  /  AlkPhos  163<H>  11-27    Creatinine Trend: 4.7 <--, 4.6 <--, 4.8 <--                        6.8    9.30  )-----------( 356      ( 27 Nov 2019 06:17 )             21.8     Urine Studies:              RADIOLOGY & ADDITIONAL STUDIES:    < from: [facility] Duplex Low Ext Arterial, Ltd, Left (11.26.19 @ 18:55) >  INTERPRETATION:  Clinical History / Reason for exam: This patient is a   59-year-old male with left lower extremity wound. Lower extremity   arterial duplex ultrasound was performed to assess for arterial occlusive   disease.    Left common femoral, deep femoral, superficial femoral, popliteal,   anterior tibial, posterior tibial and peroneal arteries were visualized.   Left posterior tibial is occluded   Moderate Disease noted in the left SFA       Impression:      1. Moderate disease in the left SFA.  2. No flow noted in the left posterior tibial artery   ICD-10:I70.222    < end of copied text >

## 2019-11-27 NOTE — CHART NOTE - NSCHARTNOTEFT_GEN_A_CORE
Pt's ABG with pH of 7.19. In the setting of sepsis and SILVINA, will start on bicarb drip. Pt's only complaint currently is LLE pain. Clinically, the pt is stable. Will repeat blood work at 4 AM to make sure pt is trending in right direction.

## 2019-11-28 NOTE — CONSULT NOTE ADULT - SUBJECTIVE AND OBJECTIVE BOX
Patient is a 59y old  Male who presents with a chief complaint of Cold left lower extremity (28 Nov 2019 11:18)      HPI:  Mr. Booker is a 58 yo male patient with Pmhx of PVD s/p R AKA, paraplegia with multiple chronic bedsores and suprapubic catheter, HTN (not on any medication), CKD stage IV (not following with nephrology), hypothyroidism, opioid dependance, recent admission due to unresponsiveness, presents for 3-4 days of left foot coldness, pain and worsening ulcers.    Patient went today to see Dr. Gooden who sent patient to ED. He noted that he has had left foot ulcers for the last 5 years, course was waxing and waning and was stable with dressing. Lately he felt his wound is getting worse and his foot felt cold. Denies any fever, chills. Patient denying any cp or sob. No GI or  complaints.    In ED, patient afebrile, tachycardic hypertensive. He was given 2L of LR, and one dose of vancomycin. Patient was seen by vascular surgery and arterial doppler US of the right LE was done. Patient will be admitted to the medical service. (26 Nov 2019 21:35)      PAST MEDICAL & SURGICAL HISTORY:  Anemia  PAD (peripheral artery disease)  Hypertension  Suprapubic catheter  Pressure ulcer  Diabetes  Lumbar compression fracture  S/P below knee amputation, right  S/P debridement  Toe amputation status, right  H/O hernia repair                      PREVIOUS DIAGNOSTIC TESTING:      ECHO  FINDINGS:    STRESS  FINDINGS:    CATHETERIZATION  FINDINGS:    MEDICATIONS  (STANDING):  ascorbic acid 500 milliGRAM(s) Oral daily  calcium acetate 667 milliGRAM(s) Oral three times a day with meals  calcium carbonate    500 mG (Tums) Chewable 1 Tablet(s) Chew every 8 hours  cefTRIAXone   IVPB 1000 milliGRAM(s) IV Intermittent every 24 hours  chlorhexidine 4% Liquid 1 Application(s) Topical <User Schedule>  cholecalciferol 1000 Unit(s) Oral daily  dextrose 5% 1000 milliLiter(s) (90 mL/Hr) IV Continuous <Continuous>  doxycycline hyclate Capsule 100 milliGRAM(s) Oral every 12 hours  heparin  Injectable 5000 Unit(s) SubCutaneous every 12 hours  levothyroxine 50 MICROGram(s) Oral daily  methadone    Tablet 80 milliGRAM(s) Oral every 6 hours  oxybutynin 10 milliGRAM(s) Oral two times a day  oxyCODONE  ER Tablet 60 milliGRAM(s) Oral three times a day  pantoprazole    Tablet 40 milliGRAM(s) Oral before breakfast    MEDICATIONS  (PRN):  diazepam    Tablet 10 milliGRAM(s) Oral every 12 hours PRN Anxiety      FAMILY HISTORY:  No pertinent family history in first degree relatives      SOCIAL HISTORY:    CIGARETTES:    ALCOHOL:        Vital Signs Last 24 Hrs  T(C): 35.6 (28 Nov 2019 04:46), Max: 37 (27 Nov 2019 20:23)  T(F): 96 (28 Nov 2019 04:46), Max: 98.6 (27 Nov 2019 20:23)  HR: 77 (28 Nov 2019 04:46) (77 - 82)  BP: 113/59 (28 Nov 2019 04:46) (84/51 - 113/59)  BP(mean): --  RR: 18 (28 Nov 2019 04:46) (17 - 18)  SpO2: 100% (28 Nov 2019 08:00) (100% - 100%)            INTERPRETATION OF TELEMETRY:    ECG:    I&O's Detail    27 Nov 2019 07:01  -  28 Nov 2019 07:00  --------------------------------------------------------  IN:    dextrose 5%: 360 mL  Total IN: 360 mL    OUT:    Voided: 2050 mL  Total OUT: 2050 mL    Total NET: -1690 mL          LABS:                        7.6    9.07  )-----------( 337      ( 28 Nov 2019 06:29 )             23.7     11-28    134<L>  |  102  |  58<H>  ----------------------------<  23<LL>  4.3   |  16<L>  |  4.7<HH>    Ca    7.6<L>      28 Nov 2019 06:29  Phos  7.0     11-28  Mg     1.7     11-27    TPro  4.8<L>  /  Alb  1.6<L>  /  TBili  <0.2  /  DBili  x   /  AST  14  /  ALT  14  /  AlkPhos  161<H>  11-28        PT/INR - ( 26 Nov 2019 18:30 )   PT: 14.60 sec;   INR: 1.27 ratio         PTT - ( 26 Nov 2019 18:30 )  PTT:36.7 sec    I&O's Summary    27 Nov 2019 07:01  -  28 Nov 2019 07:00  --------------------------------------------------------  IN: 360 mL / OUT: 2050 mL / NET: -1690 mL      BNP  RADIOLOGY & ADDITIONAL STUDIES:

## 2019-11-28 NOTE — PROGRESS NOTE ADULT - ASSESSMENT
Paraplegia pt, with multiple decubuti and PVD  ckd 4 creatinine 4.7   K+ 4.3   Co2 16   Pt refuses dialysis, long discuss with pt  about risks will continue to [place] pt

## 2019-11-28 NOTE — SWALLOW BEDSIDE ASSESSMENT ADULT - SLP GENERAL OBSERVATIONS
pt received in bed awake alert w/o c/o pain. pt w/ c/o nausea and post-prandial episodes of emesis. pt received in bed awake alert w/o c/o pain. pt w/ c/o nausea and post-prandial episodes of emesis. RN aware- zofran ordered.

## 2019-11-28 NOTE — CHART NOTE - NSCHARTNOTEFT_GEN_A_CORE
Called by RN for patient moaning and groaning with minimal responsiveness. Sugar 27.    Seen and examined at bedside. Patient responsive to painful stimulus. Spoke with RN who advised that patient has history of overdosing on opioid medications and expressed lack of desire to live.     Physical examination:  General: age appropriate lethargic and moaning. Only responsive to painful stimuli  HEENT: Pupils pinpoint and minimally reactive to light and accomodation,   Cardiac: s1s2, rrr, no m/r/g  Pulmonary: CTA b/l; no w/r/r  Neuro: Minimally responsive to painful stimuli      Vitals:  BP: 135/65  HR 74  Pulse ox: 97%  RR: 26  Finger stick: 27    Impression: hypoglycemia with high opioid tolerance     Plan:   - STAT 250cc bolus of d10  - Narcan 0.4mg x1  - Spoke with Manuel from pain management; this is likely all secondary to hypoglycemia. Pupils to be pinpoint due to high levels of opioid tolerance. Patient doses verified with pharmacy filling records. Called by RN for patient moaning and groaning with minimal responsiveness. Sugar 27.    Seen and examined at bedside. Patient responsive to painful stimulus. Spoke with RN who advised that patient has history of overdosing on opioid medications and expressed lack of desire to live.     Physical examination:  General: age appropriate lethargic and moaning. Only responsive to painful stimuli  HEENT: Pupils pinpoint and minimally reactive to light and accomodation,   Cardiac: s1s2, rrr, no m/r/g  Pulmonary: CTA b/l; no w/r/r  Neuro: Minimally responsive to painful stimuli      Vitals:  BP: 135/65  HR 74  Pulse ox: 97%  RR: 26  Finger stick: 27    Impression: hypoglycemia in a patient with high opioid tolerance    Plan:   - STAT 250cc bolus of d10  - Narcan 0.4mg x1  - IV fluids to be switched to d5 1/2NS with 2amps of bicarb at 75cc  - Spoke with Manuel from pain management; this is likely all secondary to hypoglycemia. Pupils to be pinpoint due to high levels of opioid tolerance. Patient doses verified with pharmacy filling records. Plan moving forward is to ensure appropriate glucose control. If fingersticks show better control then will proceed with continuing methadone dose which is 80mg QID. Oxycodone to be held until tomorrow and will be at a dose of 45mg.      Reassessment after Narcan and completion of D10 bolus  - patient alert and confused about what happened. Spoke to patient to reorient him. Will continue to monitor.

## 2019-11-28 NOTE — CONSULT NOTE ADULT - ASSESSMENT
ASSESSMENT:  Stage IV pressure ulcer to sacrum  no infection  left foot gangrene    RECOMMENDATION:  Wound care - Hydrogel/DPD BID to sacrum and left buttock  Betadine / DPD BID to left heel  f/u Vascular  Offloading/ positional changes

## 2019-11-28 NOTE — CONSULT NOTE ADULT - SUBJECTIVE AND OBJECTIVE BOX
PODIATRY CONSULT   MARGE BELLA is a 59y Male Patient is a 59y old  Male who presents with a chief complaint of Cold left lower extremity (28 Nov 2019 13:27)    HPI:  Mr. Bella is a 60 yo male patient with Pmhx of PVD s/p R AKA, paraplegia with multiple chronic bedsores and suprapubic catheter, HTN (not on any medication), CKD stage IV (not following with nephrology), hypothyroidism, opioid dependance, recent admission due to unresponsiveness, presents for 3-4 days of left foot coldness, pain and worsening ulcers.    Patient went today to see Dr. Gooden who sent patient to ED. He noted that he has had left foot ulcers for the last 5 years, course was waxing and waning and was stable with dressing. Lately he felt his wound is getting worse and his foot felt cold. Denies any fever, chills. Patient denying any cp or sob. No GI or  complaints.    In ED, patient afebrile, tachycardic hypertensive. He was given 2L of LR, and one dose of vancomycin. Patient was seen by vascular surgery and arterial doppler US of the right LE was done. Patient will be admitted to the medical service. (26 Nov 2019 21:35)      COLD FOOT WITH PERIPHERAL VASCULAR DISEASE  Anemia  PAD (peripheral artery disease)  Hypertension  Suprapubic catheter  Pressure ulcer  Diabetes  Lumbar compression fracture      PMH: COLD FOOT WITH PERIPHERAL VASCULAR DISEASE  Anemia  PAD (peripheral artery disease)  Hypertension  Suprapubic catheter  Pressure ulcer  Diabetes  Lumbar compression fracture    PSH: S/P below knee amputation, right  S/P debridement  Toe amputation status, right  H/O hernia repair  No significant past surgical history    Medication cefTRIAXone   IVPB 1000 milliGRAM(s) IV Intermittent every 24 hours  doxycycline hyclate Capsule 100 milliGRAM(s) Oral every 12 hours    Allergy: sulfamethizole (Unknown)        Labs:                        7.6    9.07  )-----------( 337      ( 28 Nov 2019 06:29 )             23.7     PT/INR - ( 26 Nov 2019 18:30 )   PT: 14.60 sec;   INR: 1.27 ratio         PTT - ( 26 Nov 2019 18:30 )  PTT:36.7 sec  11-28    134<L>  |  102  |  58<H>  ----------------------------<  23<LL>  4.3   |  16<L>  |  4.7<HH>    Ca    7.6<L>      28 Nov 2019 06:29  Phos  7.0     11-28  Mg     1.7     11-27    TPro  4.8<L>  /  Alb  1.6<L>  /  TBili  <0.2  /  DBili  x   /  AST  14  /  ALT  14  /  AlkPhos  161<H>  11-28          Culture - Blood (collected 27 Nov 2019 00:30)  Source: .Blood Blood-Peripheral  Preliminary Report (28 Nov 2019 07:01):    No growth to date.        ROS:  [x ]A ten point review of system was otherwise negative except as noted    Physical Exam - Lower Extremity Focused:   Derm:   Ulceration Left  Necrotic tissue noted at the wound base. Discoloration noted at heel and on dorsum aspect of the foot. BKA amputation noted on right lower extremity.   Vascular:   DP and PT pulses are non-palpables Skin temperature is cool to cold from proximal to distal  Neuro:  - Protective sensation moderately diminished.   MSK:   Manual Muscle strength +3/5 in all muscle compartments (Ll).       < from: [facility] Duplex Low Ext Arterial, Ltd, Left (11.26.19 @ 18:55) >  ******PRELIMINARY REPORT******    ******PRELIMINARY REPORT******          EXAM:  DUPLEX LOW ARTERIES UNI LTD LT            PROCEDURE DATE:  11/26/2019          ******PRELIMINARY REPORT******    ******PRELIMINARY REPORT******          INTERPRETATION:  Clinical History / Reason for exam: This patient is a   59-year-old male with left lower extremity wound. Lower extremity   arterial duplex ultrasound was performed to assess for arterial occlusive   disease.    Left common femoral, deep femoral, superficial femoral, popliteal,   anterior tibial, posterior tibial and peroneal arteries were visualized.   Left posterior tibial is occluded   Moderate Disease noted in the left SFA       Impression:      1. Moderate disease in the left SFA.  2. No flow noted in the left posterior tibial artery   ICD-10:I70.222            ******PRELIMINARY REPORT******    ******PRELIMINARY REPORT******          LEANN NAVARRO M.D., RESIDENT RADIOLOGIST                      < end of copied text >        Assessment:   Unstageable wound at heel, Left foot  PAD  DM    Plan:  Chart reviewed and Patient evaluated  Discussed diagnosis and treatment with patient  Applied betadine with dry sterile dressing/ABD/Kerlix  Wound Care Orders: as stated above  Offloading to Left Heel.   Ordered heel offloader  Arterial duplex reviewed: stated above  Spoke to Vascular;plan for BKA on Monday, 12/2  Discussed plan  with  Attending, Dr. Carpenter  No further sx intervention from podiatry PODIATRY CONSULT   MARGE BELLA is a 59y Male Patient is a 59y old  Male who presents with a chief complaint of Cold left lower extremity (28 Nov 2019 13:27)    HPI:  Mr. Bella is a 58 yo male patient with Pmhx of PVD s/p R AKA, paraplegia with multiple chronic bedsores and suprapubic catheter, HTN (not on any medication), CKD stage IV (not following with nephrology), hypothyroidism, opioid dependance, recent admission due to unresponsiveness, presents for 3-4 days of left foot coldness, pain and worsening ulcers.    Patient went today to see Dr. Gooden who sent patient to ED. He noted that he has had left foot ulcers for the last 5 years, course was waxing and waning and was stable with dressing. Lately he felt his wound is getting worse and his foot felt cold. Denies any fever, chills. Patient denying any cp or sob. No GI or  complaints.    In ED, patient afebrile, tachycardic hypertensive. He was given 2L of LR, and one dose of vancomycin. Patient was seen by vascular surgery and arterial doppler US of the right LE was done. Patient will be admitted to the medical service. (26 Nov 2019 21:35)      COLD FOOT WITH PERIPHERAL VASCULAR DISEASE  Anemia  PAD (peripheral artery disease)  Hypertension  Suprapubic catheter  Pressure ulcer  Diabetes  Lumbar compression fracture      PMH: COLD FOOT WITH PERIPHERAL VASCULAR DISEASE  Anemia  PAD (peripheral artery disease)  Hypertension  Suprapubic catheter  Pressure ulcer  Diabetes  Lumbar compression fracture    PSH: S/P below knee amputation, right  S/P debridement  Toe amputation status, right  H/O hernia repair  No significant past surgical history    Medication cefTRIAXone   IVPB 1000 milliGRAM(s) IV Intermittent every 24 hours  doxycycline hyclate Capsule 100 milliGRAM(s) Oral every 12 hours    Allergy: sulfamethizole (Unknown)        Labs:                        7.6    9.07  )-----------( 337      ( 28 Nov 2019 06:29 )             23.7     PT/INR - ( 26 Nov 2019 18:30 )   PT: 14.60 sec;   INR: 1.27 ratio         PTT - ( 26 Nov 2019 18:30 )  PTT:36.7 sec  11-28    134<L>  |  102  |  58<H>  ----------------------------<  23<LL>  4.3   |  16<L>  |  4.7<HH>    Ca    7.6<L>      28 Nov 2019 06:29  Phos  7.0     11-28  Mg     1.7     11-27    TPro  4.8<L>  /  Alb  1.6<L>  /  TBili  <0.2  /  DBili  x   /  AST  14  /  ALT  14  /  AlkPhos  161<H>  11-28          Culture - Blood (collected 27 Nov 2019 00:30)  Source: .Blood Blood-Peripheral  Preliminary Report (28 Nov 2019 07:01):    No growth to date.        ROS:  [x ]A ten point review of system was otherwise negative except as noted    Physical Exam - Lower Extremity Focused:   Derm:   Ulceration Left  Necrotic tissue noted at the wound base. Discoloration noted at heel and on dorsum aspect of the foot. BKA amputation noted on right lower extremity.   Vascular:   DP and PT pulses are non-palpables Skin temperature is cool to cold from proximal to distal  Neuro:  - Protective sensation moderately diminished.   MSK:   Manual Muscle strength +3/5 in all muscle compartments (Ll).       < from: [facility] Duplex Low Ext Arterial, Ltd, Left (11.26.19 @ 18:55) >  ******PRELIMINARY REPORT******    ******PRELIMINARY REPORT******          EXAM:  DUPLEX LOW ARTERIES UNI LTD LT            PROCEDURE DATE:  11/26/2019          ******PRELIMINARY REPORT******    ******PRELIMINARY REPORT******          INTERPRETATION:  Clinical History / Reason for exam: This patient is a   59-year-old male with left lower extremity wound. Lower extremity   arterial duplex ultrasound was performed to assess for arterial occlusive   disease.    Left common femoral, deep femoral, superficial femoral, popliteal,   anterior tibial, posterior tibial and peroneal arteries were visualized.   Left posterior tibial is occluded   Moderate Disease noted in the left SFA       Impression:      1. Moderate disease in the left SFA.  2. No flow noted in the left posterior tibial artery   ICD-10:I70.222            ******PRELIMINARY REPORT******    ******PRELIMINARY REPORT******          LEANN NAVARRO M.D., RESIDENT RADIOLOGIST                      < end of copied text >        Assessment:   Unstageable heel ulcer, Left foot  PAD  DM    Plan:  Chart reviewed and Patient evaluated  Discussed diagnosis and treatment with patient  Applied betadine with dry sterile dressing/ABD/Kerlix  Wound Care Orders: as stated above  Offloading to Left Heel.   Ordered heel offloader  Arterial duplex reviewed: stated above  Spoke to Vascular;plan for BKA on Monday, 12/2  Discussed plan  with  Attending, Dr. Carpenter  No further sx intervention from podiatry

## 2019-11-28 NOTE — PROGRESS NOTE ADULT - SUBJECTIVE AND OBJECTIVE BOX
MARGE BELLA  [facility] T2-3A 024 A ([facility] T2-3A)            Patient was evaluated and examined  by bedside, looks overly sedated post methadone and oxycodone tx. , had hypoglycemia x 2 events, corrected. no fever. borderline low b/p. no fever.     REVIEW OF SYSTEMS:  please see pertinent positives mentioned above, all other 12 ROS negative      T(C): , Max: 37 (11-27-19 @ 20:23)  HR: 77 (11-28-19 @ 04:46)  BP: 113/59 (11-28-19 @ 04:46)  RR: 18 (11-28-19 @ 04:46)  SpO2: 100% (11-28-19 @ 08:00)  CAPILLARY BLOOD GLUCOSE  84 (27 Nov 2019 17:21)      POCT Blood Glucose.: 145 mg/dL (28 Nov 2019 10:28)  POCT Blood Glucose.: 38 mg/dL (28 Nov 2019 08:09)  POCT Blood Glucose.: 33 mg/dL (28 Nov 2019 07:38)  POCT Blood Glucose.: 82 mg/dL (28 Nov 2019 03:02)  POCT Blood Glucose.: 94 mg/dL (27 Nov 2019 20:31)      PHYSICAL EXAM:  General: NAD, AAOX3, patient is laying comfortably in bed, was very sleepy and "dosing off" during my bedside examination.  HEENT: AT, NC, Supple, NO JVD, NO CB  Lungs: CTA B/L, no wheezing, no rhonchi  CVS: normal S1, S2, RRR, NO M/G/R  Abdomen: soft, bowel sounds present, non-tender, non-distended, suprapubic catheter present  Extremities: right BKA, left foot -toes -dry gangrene present, no edema, no clubbing, no cyanosis, positive peripheral pulses b/l  Neuro: no acute focal neurological deficits, overly sedated.  Skin: dry gangrene of left foot toes, no rash, no ecchymosis, pallor skin, no cental cyanosis      LAB  CBC  Date: 11-28-19 @ 06:29  Mean cell Yoyexlnhcn89.1  Mean cell Hemoglobin Conc32.1  Mean cell Volum 87.8  Platelet count-Automate 337  RBC Count 2.70  Red Cell Distrib Width16.0  WBC Count9.07  % Albumin, Urine--  Hematocrit 23.7  Hemoglobin 7.6  CBC  Date: 11-27-19 @ 18:13  Mean cell Pbufpfomsv06.7  Mean cell Hemoglobin Conc31.1  Mean cell Volum 89.1  Platelet count-Automate 335  RBC Count 2.74  Red Cell Distrib Width16.0  WBC Count8.50  % Albumin, Urine--  Hematocrit 24.4  Hemoglobin 7.6  CBC  Date: 11-27-19 @ 06:17  Mean cell Outihthrbk65.6  Mean cell Hemoglobin Conc31.2  Mean cell Volum 88.6  Platelet count-Automate 356  RBC Count 2.46  Red Cell Distrib Width16.1  WBC Count9.30  % Albumin, Urine--  Hematocrit 21.8  Hemoglobin 6.8  CBC  Date: 11-27-19 @ 00:30  Mean cell Hbzgzqrxfn70.2  Mean cell Hemoglobin Conc30.3  Mean cell Volum 89.9  Platelet count-Automate 363  RBC Count 2.57  Red Cell Distrib Width16.1  WBC Count10.52  % Albumin, Urine--  Hematocrit 23.1  Hemoglobin 7.0  CBC  Date: 11-26-19 @ 18:30  Mean cell Suuujmmlwz05.8  Mean cell Hemoglobin Conc30.6  Mean cell Volum 91.0  Platelet count-Automate 389  RBC Count 2.77  Red Cell Distrib Width16.3  WBC Count11.53  % Albumin, Urine--  Hematocrit 25.2  Hemoglobin 7.7    BMP  11-28-19 @ 06:29  Blood Gas Arterial-Calcium,Ionized--  Blood Urea Nitrogen, Serum 58 mg/dL<H> [10 - 20]  Carbon Dioxide, Serum16 mmol/L<L> [17 - 32]  Chloride, Vqaug654 mmol/L [98 - 110]  Creatinie, Serum4.7 mg/dL<HH> [0.7 - 1.5] [Critical value:]  Glucose, Serum23 mg/dL<LL> [70 - 99] [TYPE: HYPERCRITICAL RESULT  TESTS: Glu  DATE/TIME CALLED: 11/28/19 07:38  CALLED TO: Dr. Bess  READ BACK (2 Patient Identifiers)(Y/N): y  READ BACK VALUES (Y/N): y  LICENSED STAFF AVAILABLE FOR IMMEDIATE TREATMENT (Y/N): y  RAPID RESPONSE TEAM NOTIFIED (Y/N): n  RRT CALLED TO:n/a  CALLED BY: maggy]  Potassium, Serum4.3 mmol/L [3.5 - 5.0]  Sodium, Serum 134 mmol/L<L> [135 - 146]            11-27-19 @ 06:17  Blood Gas Arterial-Calcium,Ionized--  Blood Urea Nitrogen, Serum 68 mg/dL<HH> [10 - 20] [Critical value:]  Carbon Dioxide, Serum14 mmol/L<L> [17 - 32]  Chloride, Mjttn503 mmol/L [98 - 110]  Creatinie, Serum4.7 mg/dL<HH> [0.7 - 1.5] [Critical value:]  Glucose, Serum72 mg/dL [70 - 99]  Potassium, Serum4.9 mmol/L [3.5 - 5.0]  Sodium, Serum 135 mmol/L [135 - 146]  [facility]    11-27-19 @ 00:30  Blood Gas Arterial-Calcium,Ionized--  Blood Urea Nitrogen, Serum 65 mg/dL<HH> [10 - 20] [Critical value:]  Carbon Dioxide, Serum13 mmol/L<L> [17 - 32]  Chloride, Penjl916 mmol/L [98 - 110]  Creatinie, Serum4.6 mg/dL<HH> [0.7 - 1.5] [Critical value:]  Glucose, Serum98 mg/dL [70 - 99]  Potassium, Serum5.0 mmol/L [3.5 - 5.0]  Sodium, Serum 133 mmol/L<L> [135 - 146]  [facility]  11-26-19 @ 18:30  Blood Gas Arterial-Calcium,Ionized--  Blood Urea Nitrogen, Serum 71 mg/dL<HH> [10 - 20] [Critical value:  TYPE:(C=Critical, N=Notification, A=Abnormal) C  TESTS: BUN,CREAT .  DATE/TIME CALLED: 11/26/19 19:06  CALLED TO:   READ BACK (2 Patient Identifiers)(Y/N): Y  READ BACK VALUES (Y/N): Y  CALLED BY: SP]  Carbon Dioxide, Serum13 mmol/L<L> [17 - 32]  Chloride, Yjncz060 mmol/L [98 - 110]  Creatinie, Serum4.8 mg/dL<HH> [0.7 - 1.5] [Critical value:  TYPE:(C=Critical, N=Notification, A=Abnormal) C  TESTS: BUN,CREAT .  DATE/TIME CALLED: 11/26/19 19:06  CALLED TO: DR.SCHWARTZ  READ BACK (2 Patient Identifiers)(Y/N): Y  READ BACK VALUES (Y/N): Y  CALLED BY: SP]  Glucose, Serum60 mg/dL<L> [70 - 99]  Potassium, Serum5.3 mmol/L<H> [3.5 - 5.0]  Sodium, Serum 132 mmol/L<L> [135 - 146]        PT/INR - ( 26 Nov 2019 18:30 )   PT: 14.60 sec;   INR: 1.27 ratio         PTT - ( 26 Nov 2019 18:30 )  PTT:36.7 sec      Microbiology:    Culture - Blood (collected 11-27-19 @ 00:30)  Source: .Blood Blood-Peripheral  Preliminary Report (11-28-19 @ 07:01):    No growth to date.          Medications:  ascorbic acid 500 milliGRAM(s) Oral daily  calcium acetate 667 milliGRAM(s) Oral three times a day with meals  calcium carbonate    500 mG (Tums) Chewable 1 Tablet(s) Chew every 8 hours  cefTRIAXone   IVPB 1000 milliGRAM(s) IV Intermittent every 24 hours  chlorhexidine 4% Liquid 1 Application(s) Topical <User Schedule>  cholecalciferol 4000 Unit(s) Oral daily  dextrose 40% Gel 15 Gram(s) Oral once PRN  dextrose 50% Injectable 12.5 Gram(s) IV Push once  dextrose 50% Injectable 25 Gram(s) IV Push once  dextrose 50% Injectable 25 Gram(s) IV Push once  diazepam    Tablet 10 milliGRAM(s) Oral every 12 hours PRN  doxycycline hyclate Capsule 100 milliGRAM(s) Oral every 12 hours  ferrous    sulfate 325 milliGRAM(s) Oral two times a day  glucagon  Injectable 1 milliGRAM(s) IntraMuscular once PRN  heparin  Injectable 5000 Unit(s) SubCutaneous every 12 hours  levothyroxine 50 MICROGram(s) Oral daily  methadone    Tablet 80 milliGRAM(s) Oral every 6 hours  oxybutynin 10 milliGRAM(s) Oral two times a day  pantoprazole    Tablet 40 milliGRAM(s) Oral before breakfast  sodium chloride 0.45% 1000 milliLiter(s) IV Continuous <Continuous>        Assessment and Plan:  Mr. Bella is a 58 yo male patient with PMHx of PVD s/p Right AKA, paraplegia with multiple chronic bedsores and chronic  suprapubic catheter, HTN (not on any medication), CKD stage IV (not following with nephrology), hypothyroidism, opioid dependance, recent admission due to unresponsiveness, presents for 3-4 days of left foot coldness, pain and worsening ulcers.    # Chronic left foot peripheral artery disease + dry gangrene  - Patient with chronic wound of left foot with gangrenous changes.   - Doppler US showed Moderate disease in the left SFA, No flow noted in the left posterior tibial artery   - Patient was not on any antiplatelets or statin.   - Evaluated by vascular surgery, plan for angiogram on Friday  - Cardio consulted by vascular for, patient for possible L BKA on Monday .  - will keep on maintenance fluids  - Will continue ceftriaxone + doxycycline- will get ID on board, f/up recommendations    # hypoglycemia- if pt. has recurrent event - consider changing IVF to D5with bicarb. infusion.      # Poor PO intake, difficulty swallowing  - Patient with occasional difficulty swallowing at home  - dysphagia diet - with speech tx. f/up    - Hypotension- no sepsis  -patient looks over sedated with opioids and anxiolytic therapy- decrease diazepam to 5 mg po twice daily as needed for anxiety episodes.        Anemia due to Iron deficiency and CKD  - s/p 1 unit pRBC 11/27/19    -h/h remains low stable, started on PO iron tx.  -obtain folate and vitamin B 12 levels    # SILVINA on CKD stage 4 + HAGMA  - Patient with serum creatinine 4.7 up from 3.7  11-28    134<L>  |  102  |  58<H>  ----------------------------<  23<LL>  4.3   |  16<L>  |  4.7<HH>    Ca    7.6<L>      28 Nov 2019 06:29  Phos  7.0     11-28  Mg     1.7     11-27    - Patient refusing dialysis, or following with nephrologist.   -Nephrology on Board.  - on sodium bicarb- 1.5 amps in 1/2 NS at 100 ml/hr- next BMP at 6 pm today  - started phoslo,   - Consider LINDY and Epogen  -f/up daily magnesium and phos levels  -avoid nephrotoxic agents.      #h/o  HTN- now borderline low b/p  -continue to monitor VS    # Opioid dependance  - Patient on methadone 80 mg QID + Oxycontin 60 mg QID ( oversedated- ? how pt. can be on both)- need to get pain management consult- d/c oxycodone.      # Chronic sacral bedsore.  - Followed by Dr. Gooden    # Hypothyroidism  - Will continue levothyroxine 50 mcg daily  -obtain thyroid profile    # Severe Vitamin D deficiency- on vitamin D tx.    daily DVT proph.    #Progress Note Handoff: monitor bsfs, consult pain management ? dual opioid and methadone tx., f/up vascular, monitor BMP for metabolic acidosis and SILVINA.  Family discussion: medical plan of tx. d/w pt. Disposition: to be determined

## 2019-11-28 NOTE — PROGRESS NOTE ADULT - SUBJECTIVE AND OBJECTIVE BOX
AJAY FOLLOW UP NOTE  --------------------------------------------------------------------------------  Chief Complaint:    24 hour events/subjective:        PAST HISTORY  --------------------------------------------------------------------------------  No significant changes to PMH, PSH, FHx, SHx, unless otherwise noted    ALLERGIES & MEDICATIONS  --------------------------------------------------------------------------------  Allergies    sulfamethizole (Unknown)    Intolerances      Standing Inpatient Medications  ascorbic acid 500 milliGRAM(s) Oral daily  calcium acetate 667 milliGRAM(s) Oral three times a day with meals  calcium carbonate    500 mG (Tums) Chewable 1 Tablet(s) Chew every 8 hours  cefTRIAXone   IVPB 1000 milliGRAM(s) IV Intermittent every 24 hours  chlorhexidine 4% Liquid 1 Application(s) Topical <User Schedule>  cholecalciferol 1000 Unit(s) Oral daily  dextrose 5% 1000 milliLiter(s) IV Continuous <Continuous>  doxycycline hyclate Capsule 100 milliGRAM(s) Oral every 12 hours  heparin  Injectable 5000 Unit(s) SubCutaneous every 12 hours  levothyroxine 50 MICROGram(s) Oral daily  methadone    Tablet 80 milliGRAM(s) Oral every 6 hours  oxybutynin 10 milliGRAM(s) Oral two times a day  oxyCODONE  ER Tablet 60 milliGRAM(s) Oral three times a day  pantoprazole    Tablet 40 milliGRAM(s) Oral before breakfast    PRN Inpatient Medications  diazepam    Tablet 10 milliGRAM(s) Oral every 12 hours PRN      REVIEW OF SYSTEMS  --------------------------------------------------------------------------------  Gen: No weight changes, fatigue, fevers/chills, weakness  Skin: No rashes  Head/Eyes/Ears/Mouth: No headache; Normal hearing; Normal vision w/o blurriness; No sinus pain/discomfort, sore throat  Respiratory: No dyspnea, cough, wheezing, hemoptysis  CV: No chest pain, PND, orthopnea  GI: No abdominal pain, diarrhea, constipation, nausea, vomiting, melena, hematochezia  : No increased frequency, dysuria, hematuria, nocturia  MSK: No joint pain/swelling; no back pain; no edema  Neuro: No dizziness/lightheadedness, weakness, seizures, numbness, tingling  Heme: No easy bruising or bleeding  Endo: No heat/cold intolerance  Psych: No significant nervousness, anxiety, stress, depression    All other systems were reviewed and are negative, except as noted.    VITALS/PHYSICAL EXAM  --------------------------------------------------------------------------------  T(C): 35.6 (11-28-19 @ 04:46), Max: 37 (11-27-19 @ 20:23)  HR: 77 (11-28-19 @ 04:46) (77 - 82)  BP: 113/59 (11-28-19 @ 04:46) (84/51 - 113/59)  RR: 18 (11-28-19 @ 04:46) (17 - 18)  SpO2: 100% (11-28-19 @ 08:00) (100% - 100%)  Wt(kg): --  Height (cm): 185.4 (11-27-19 @ 02:00)  Weight (kg): 63.4 (11-27-19 @ 02:00)  BMI (kg/m2): 18.4 (11-27-19 @ 02:00)  BSA (m2): 1.85 (11-27-19 @ 02:00)      11-27-19 @ 07:01  -  11-28-19 @ 07:00  --------------------------------------------------------  IN: 360 mL / OUT: 2050 mL / NET: -1690 mL      Physical Exam:  	Gen: NAD, well-appearing  	HEENT: PERRL, supple neck, clear oropharynx  	Pulm: CTA B/L  	CV: RRR, S1S2; no rub  	Back: No spinal or CVA tenderness; no sacral edema  	Abd: +BS, soft, nontender/nondistended  	: No suprapubic tenderness  	UE: Warm, FROM, no clubbing, intact strength; no edema; no asterixis  	LE: Warm, FROM, no clubbing, intact strength; no edema  	Neuro: No focal deficits, intact gait  	Psych: Normal affect and mood  	Skin: Warm, without rashes  	Vascular access:    LABS/STUDIES  --------------------------------------------------------------------------------              7.6    9.07  >-----------<  337      [11-28-19 @ 06:29]              23.7     134  |  102  |  58  ----------------------------<  23      [11-28-19 @ 06:29]  4.3   |  16  |  4.7        Ca     7.6     [11-28-19 @ 06:29]      Mg     1.7     [11-27-19 @ 06:17]      Phos  7.0     [11-28-19 @ 06:29]    TPro  4.8  /  Alb  1.6  /  TBili  <0.2  /  DBili  x   /  AST  14  /  ALT  14  /  AlkPhos  161  [11-28-19 @ 06:29]    PT/INR: PT 14.60, INR 1.27       [11-26-19 @ 18:30]  PTT: 36.7       [11-26-19 @ 18:30]      Creatinine Trend:  SCr 4.7 [11-28 @ 06:29]  SCr 4.6 [11-28 @ 01:25]  SCr 4.7 [11-27 @ 06:17]  SCr 4.6 [11-27 @ 00:30]  SCr 4.8 [11-26 @ 18:30]        Iron 27, TIBC 89, %sat 30      [11-27-19 @ 06:17]  Ferritin 679      [11-27-19 @ 06:17]  PTH -- (Ca 7.0)      [10-14-19 @ 19:45]   209  PTH -- (Ca 7.8)      [07-02-19 @ 10:54]   71  Vitamin D (25OH) <5      [10-14-19 @ 19:45]  HbA1c 5.5      [06-27-19 @ 06:35]

## 2019-11-28 NOTE — DIETITIAN INITIAL EVALUATION ADULT. - FACTORS AFF FOOD INTAKE
Pt reports poor appetite for months and gotten worse recently. He states his "insides are like boiling in a cauldron". He declines all nutrition supplements as he does not "want to put my stomach through that again". Declines giving me food preferences. Declined to talk to RD further. LBWAN 11/26 documented. MAHSA

## 2019-11-28 NOTE — DIETITIAN INITIAL EVALUATION ADULT. - PHYSICAL APPEARANCE
well nourished/BMI 19.6  adjusted for R BKA. BS 13, Pressure ulcers: stg 3 sacral spinal, R buttocks, R knee. stg 2 L IT. Unable to complete NFPF as pt not agreeable.

## 2019-11-28 NOTE — CONSULT NOTE ADULT - SUBJECTIVE AND OBJECTIVE BOX
59y  Male  HPI:  Mr. Booker is a 60 yo male patient with Pmhx of PVD s/p R AKA, paraplegia with multiple chronic bedsores and suprapubic catheter, HTN (not on any medication), CKD stage IV (not following with nephrology), hypothyroidism, opioid dependance, recent admission due to unresponsiveness, presents for 3-4 days of left foot coldness, pain and worsening ulcers.    Patient went today to see Dr. Gooden who sent patient to ED. He noted that he has had left foot ulcers for the last 5 years, course was waxing and waning and was stable with dressing. Lately he felt his wound is getting worse and his foot felt cold. Denies any fever, chills. Patient denying any cp or sob. No GI or  complaints.    In ED, patient afebrile, tachycardic hypertensive. He was given 2L of LR, and one dose of vancomycin. Patient was seen by vascular surgery and arterial doppler US of the right LE was done. Patient will be admitted to the medical service. (26 Nov 2019 21:35)    Hospital course***  Allergies    sulfamethizole (Unknown)    Intolerances      PAST MEDICAL & SURGICAL HISTORY:  Anemia  PAD (peripheral artery disease)  Hypertension  Suprapubic catheter  Pressure ulcer  Diabetes  Lumbar compression fracture  S/P below knee amputation, right  S/P debridement  Toe amputation status, right  H/O hernia repair      Labs:                        7.6    9.07  )-----------( 337      ( 28 Nov 2019 06:29 )             23.7     11-28    134<L>  |  102  |  58<H>  ----------------------------<  23<LL>  4.3   |  16<L>  |  4.7<HH>    Ca    7.6<L>      28 Nov 2019 06:29  Phos  7.0     11-28  Mg     1.7     11-27    TPro  4.8<L>  /  Alb  1.6<L>  /  TBili  <0.2  /  DBili  x   /  AST  14  /  ALT  14  /  AlkPhos  161<H>  11-28      PE:  PHYSICAL EXAM:  AAO x 3  ful thickness wound to sacrum and left buttock  healing well  left foot purple and cold with necrotic heel

## 2019-11-28 NOTE — DIETITIAN INITIAL EVALUATION ADULT. - OTHER INFO
Pt adm for Cold left lower extremity. Chronic left foot peripheral artery disease + dry gangrene. Cardio consulted by vascular for, patient for possible L BKA on Monday.  Poor PO intake, difficulty swallowing. SLP eval? Hypotension- possible sepsis. SILVINA on CKD + HAGMA - refusing HD.  Normocytic anemia- Possibly due to CKD.  Chronic sacral bedsore.

## 2019-11-28 NOTE — DIETITIAN INITIAL EVALUATION ADULT. - ADD RECOMMEND
Consider liberalizing diet by removing DASH/TLC diet modifier and add zinc sulfate 220mg x14 days for wound healing. Continue renal diet. Pt declined all nutrition supplements.

## 2019-11-28 NOTE — DIETITIAN INITIAL EVALUATION ADULT. - ENERGY NEEDS
calorie needs: 1902 - 2219 kcals/day (30-35 kcals/kg for pt on HD)  protein needs: 76 - 95 gms/day (1.2 - 1.5 gm/kg for pt on HD)  fluid needs: per renal calorie needs: 1902 - 2219 kcals/day (30-35 kcals/kg for pressure ulcer)  protein needs: 63 - 75 gms/day (1.0 - 1.2  gm/kg for ESRD not on HD and PU's)  fluid needs: per renal

## 2019-11-28 NOTE — SWALLOW BEDSIDE ASSESSMENT ADULT - SLP PERTINENT HISTORY OF CURRENT PROBLEM
pt admitted for cold LLE. PMHx: PVD s/p R AKA, paraplegia with multiple chronic bedsores and suprapubic catheter, HTN (not on any medication), CKD stage IV (not following with nephrology), hypothyroidism, opioid dependance; pt being treated for Chronic left foot peripheral artery disease + dry gangrene; SLP c/s 2' reported occasional difficulty swallowing at home and chronic poor PO intake. pt admitted for cold LLE. PMHx: PVD s/p R AKA, paraplegia with multiple chronic bedsores and suprapubic catheter, HTN (not on any medication), CKD stage IV (not following with nephrology), hypothyroidism, opioid dependance; pt being treated for Chronic left foot peripheral artery disease + dry gangrene; SLP c/s 2' reported occasional difficulty swallowing at home and chronic poor PO intake.; pt known to SLP dept from previous admissions w/ recs for regular w/ thins.

## 2019-11-29 NOTE — PROGRESS NOTE ADULT - SUBJECTIVE AND OBJECTIVE BOX
SUBJECTIVE:     Today is hospital day 3d. This morning he is resting comfortably in bed and reports no new issues or overnight events.     PAST MEDICAL & SURGICAL HISTORY  Anemia  PAD (peripheral artery disease)  Hypertension  Suprapubic catheter  Pressure ulcer  Diabetes  Lumbar compression fracture  S/P below knee amputation, right  S/P debridement  Toe amputation status, right  H/O hernia repair    SOCIAL HISTORY:  Negative for smoking/alcohol/drug use.     ALLERGIES:  sulfamethizole (Unknown)    MEDICATIONS:  STANDING MEDICATIONS  ascorbic acid 500 milliGRAM(s) Oral daily  calcium acetate 667 milliGRAM(s) Oral three times a day with meals  calcium carbonate    500 mG (Tums) Chewable 1 Tablet(s) Chew every 8 hours  cefTRIAXone   IVPB 1000 milliGRAM(s) IV Intermittent every 24 hours  chlorhexidine 4% Liquid 1 Application(s) Topical <User Schedule>  cholecalciferol 4000 Unit(s) Oral daily  dextrose 5% + sodium chloride 0.45% 1000 milliLiter(s) IV Continuous <Continuous>  dextrose 50% Injectable 12.5 Gram(s) IV Push once  dextrose 50% Injectable 25 Gram(s) IV Push once  dextrose 50% Injectable 25 Gram(s) IV Push once  doxycycline hyclate Capsule 100 milliGRAM(s) Oral every 12 hours  ferrous    sulfate 325 milliGRAM(s) Oral two times a day  heparin  Injectable 5000 Unit(s) SubCutaneous every 12 hours  levothyroxine 50 MICROGram(s) Oral daily  magnesium sulfate  IVPB 2 Gram(s) IV Intermittent every 6 hours  methadone    Tablet 80 milliGRAM(s) Oral every 6 hours  oxybutynin 10 milliGRAM(s) Oral two times a day  pantoprazole    Tablet 40 milliGRAM(s) Oral before breakfast    PRN MEDICATIONS  dextrose 40% Gel 15 Gram(s) Oral once PRN  diazepam    Tablet 5 milliGRAM(s) Oral two times a day PRN  glucagon  Injectable 1 milliGRAM(s) IntraMuscular once PRN    VITALS:   T(F): 97.6  HR: 88  BP: 100/56  RR: 16  SpO2: 96%    LABS:                        7.8    14.01 )-----------( 332      ( 29 Nov 2019 07:32 )             24.6     11-29    137  |  102  |  52<H>  ----------------------------<  62<L>  4.5   |  21  |  4.2<HH>    Ca    7.6<L>      29 Nov 2019 07:32  Phos  6.2     11-29  Mg     1.3     11-29    TPro  4.5<L>  /  Alb  1.6<L>  /  TBili  <0.2  /  DBili  x   /  AST  10  /  ALT  12  /  AlkPhos  146<H>  11-29        ABG - ( 28 Nov 2019 14:45 )  pH, Arterial: 7.34  pH, Blood: x     /  pCO2: 38    /  pO2: 133   / HCO3: 20    / Base Excess: -4.9  /  SaO2: 99                    Culture - Blood (collected 27 Nov 2019 00:30)  Source: .Blood Blood-Peripheral  Preliminary Report (28 Nov 2019 07:01):    No growth to date.        PHYSICAL EXAM:    GENERAL: NAD, lying in bed comfortably  HEAD:  Atraumatic, Normocephalic  EYES: EOMI, PERRLA, conjunctiva and sclera clear  ENT: Moist mucous membranes  NECK: Supple, No JVD  CHEST/LUNG: Clear to auscultation bilaterally; No rales, rhonchi, wheezing, or rubs. Unlabored respirations  HEART: Regular rate and rhythm; No murmurs, rubs, or gallops  ABDOMEN: Bowel sounds present; Soft, Nontender, Nondistended. No hepatomegally  EXTREMITIES:  2+ Peripheral Pulses, brisk capillary refill. No clubbing, cyanosis, or edema  NERVOUS SYSTEM:  Alert & Oriented X3, speech clear. No deficits   MSK: FROM all 4 extremities, full and equal strength  SKIN: No rashes or lesions

## 2019-11-29 NOTE — PROGRESS NOTE ADULT - SUBJECTIVE AND OBJECTIVE BOX
Nephrology progress note    THIS IS AN INCOMPLETE NOTE . FULL NOTE TO FOLLOW SHORTLY    Patient is seen and examined, events over the last 24 h noted .    Allergies:  sulfamethizole (Unknown)    Hospital Medications:   MEDICATIONS  (STANDING):  ascorbic acid 500 milliGRAM(s) Oral daily  calcium acetate 667 milliGRAM(s) Oral three times a day with meals  calcium carbonate    500 mG (Tums) Chewable 1 Tablet(s) Chew every 8 hours  cefTRIAXone   IVPB 1000 milliGRAM(s) IV Intermittent every 24 hours  chlorhexidine 4% Liquid 1 Application(s) Topical <User Schedule>  cholecalciferol 4000 Unit(s) Oral daily  dextrose 5% + sodium chloride 0.45% 1000 milliLiter(s) (75 mL/Hr) IV Continuous <Continuous>  dextrose 50% Injectable 12.5 Gram(s) IV Push once  dextrose 50% Injectable 25 Gram(s) IV Push once  dextrose 50% Injectable 25 Gram(s) IV Push once  doxycycline hyclate Capsule 100 milliGRAM(s) Oral every 12 hours  ferrous    sulfate 325 milliGRAM(s) Oral two times a day  heparin  Injectable 5000 Unit(s) SubCutaneous every 12 hours  levothyroxine 50 MICROGram(s) Oral daily  methadone    Tablet 80 milliGRAM(s) Oral every 6 hours  oxybutynin 10 milliGRAM(s) Oral two times a day  pantoprazole    Tablet 40 milliGRAM(s) Oral before breakfast        VITALS:  T(F): 97.6 (11-29-19 @ 06:45), Max: 97.6 (11-29-19 @ 05:59)  HR: 88 (11-29-19 @ 06:45)  BP: 100/56 (11-29-19 @ 06:45)  RR: 16 (11-29-19 @ 06:45)  SpO2: 96% (11-29-19 @ 06:45)  Wt(kg): --    11-27 @ 07:01  -  11-28 @ 07:00  --------------------------------------------------------  IN: 360 mL / OUT: 2050 mL / NET: -1690 mL    11-28 @ 07:01  -  11-29 @ 07:00  --------------------------------------------------------  IN: 360 mL / OUT: 1900 mL / NET: -1540 mL          PHYSICAL EXAM:  Constitutional: NAD  HEENT: anicteric sclera, oropharynx clear, MMM  Neck: No JVD  Respiratory: CTAB, no wheezes, rales or rhonchi  Cardiovascular: S1, S2, RRR  Gastrointestinal: BS+, soft, NT/ND  Extremities: No cyanosis or clubbing. No peripheral edema  :  No brown.   Skin: No rashes    LABS:  11-29    137  |  102  |  52<H>  ----------------------------<  62<L>  4.5   |  21  |  4.2<HH>    Ca    7.6<L>      29 Nov 2019 07:32  Phos  6.2     11-29  Mg     1.3     11-29    TPro  4.5<L>  /  Alb  1.6<L>  /  TBili  <0.2  /  DBili      /  AST  10  /  ALT  12  /  AlkPhos  146<H>  11-29                          7.8    14.01 )-----------( 332      ( 29 Nov 2019 07:32 )             24.6       Urine Studies:      RADIOLOGY & ADDITIONAL STUDIES: Nephrology progress note  Patient is seen and examined, events over the last 24 h noted .  lying in bed comfortable  Family at bedside     Allergies:  sulfamethizole (Unknown)    Hospital Medications:   MEDICATIONS  (STANDING):    ascorbic acid 500 milliGRAM(s) Oral daily  calcium acetate 667 milliGRAM(s) Oral three times a day with meals  calcium carbonate    500 mG (Tums) Chewable 1 Tablet(s) Chew every 8 hours  cefTRIAXone   IVPB 1000 milliGRAM(s) IV Intermittent every 24 hours  chlorhexidine 4% Liquid 1 Application(s) Topical <User Schedule>  cholecalciferol 4000 Unit(s) Oral daily  dextrose 5% + sodium chloride 0.45% 1000 milliLiter(s) (75 mL/Hr) IV Continuous <Continuous>  doxycycline hyclate Capsule 100 milliGRAM(s) Oral every 12 hours  ferrous    sulfate 325 milliGRAM(s) Oral two times a day  heparin  Injectable 5000 Unit(s) SubCutaneous every 12 hours  levothyroxine 50 MICROGram(s) Oral daily  methadone    Tablet 80 milliGRAM(s) Oral every 6 hours  oxybutynin 10 milliGRAM(s) Oral two times a day  pantoprazole    Tablet 40 milliGRAM(s) Oral before breakfast        VITALS:  T(F): 97.6 (11-29-19 @ 06:45), Max: 97.6 (11-29-19 @ 05:59)  HR: 88 (11-29-19 @ 06:45)  BP: 100/56 (11-29-19 @ 06:45)  RR: 16 (11-29-19 @ 06:45)  SpO2: 96% (11-29-19 @ 06:45)      11-27 @ 07:01  -  11-28 @ 07:00  --------------------------------------------------------  IN: 360 mL / OUT: 2050 mL / NET: -1690 mL    11-28 @ 07:01  -  11-29 @ 07:00  --------------------------------------------------------  IN: 360 mL / OUT: 1900 mL / NET: -1540 mL          PHYSICAL EXAM:  Constitutional: NAD  HEENT: anicteric sclera, oropharynx clear, MMM  Neck: No JVD  Respiratory: CTAB, no wheezes, rales or rhonchi  Cardiovascular: S1, S2, RRR  Gastrointestinal: BS+, soft, NT/ND  Extremities: No cyanosis or clubbing. No peripheral edema/ right bKA   :  No brown.   Skin: No rashes    LABS:  11-29    137  |  102  |  52<H>  ----------------------------<  62<L>  4.5   |  21  |  4.2<HH>    Ca    7.6<L>      29 Nov 2019 07:32  Phos  6.2     11-29  Mg     1.3     11-29    TPro  4.5<L>  /  Alb  1.6<L>  /  TBili  <0.2  /  DBili      /  AST  10  /  ALT  12  /  AlkPhos  146<H>  11-29                          7.8    14.01 )-----------( 332      ( 29 Nov 2019 07:32 )             24.6       Urine Studies:      RADIOLOGY & ADDITIONAL STUDIES:

## 2019-11-29 NOTE — CONSULT NOTE ADULT - SUBJECTIVE AND OBJECTIVE BOX
GI HPI:  Mr. Booker is a 58 yo male patient with Pmhx of PVD s/p R AKA, paraplegia with multiple chronic bedsores and suprapubic catheter, HTN (not on any medication), CKD stage IV (not following with nephrology), hypothyroidism, opioid dependance, recent admission due to unresponsiveness, presents for 3-4 days of left foot coldness, pain and worsening ulcers.  Patient being treated for L Foot Gangrene and SILVINA. GI called for dysphagia. Pt reports having dysphagia since 2 years to solid mainly , intermittent, lately got worse associated with food regurgitation and weight loss loss of at least 40 pounds the last 2 years. Pt also reports intermittent hematochezia   He denies hematemesis, melena, reflux, abdominal pain     Previous EGD: NOTHING ON RECORDS    Previous colonoscopy: Nothing on records   PAST MEDICAL & SURGICAL HISTORY  Anemia  PAD (peripheral artery disease)  Hypertension  Suprapubic catheter  Pressure ulcer  Diabetes  Lumbar compression fracture  S/P below knee amputation, right  S/P debridement  Toe amputation status, right  H/O hernia repair        SOCIAL HISTORY:  smoker: + smoker  Alcohol: Non alcoholic  Drug: Denies use of drugs except Marijuana use   FAMILY HISTORY:  FAMILY HISTORY:  No pertinent family history in first degree relatives      ALLERGIES:  sulfamethizole (Unknown)      MEDICATIONS:  MEDICATIONS  (STANDING):  ascorbic acid 500 milliGRAM(s) Oral daily  calcium acetate 667 milliGRAM(s) Oral three times a day with meals  calcium carbonate    500 mG (Tums) Chewable 1 Tablet(s) Chew every 8 hours  cefTRIAXone   IVPB 1000 milliGRAM(s) IV Intermittent every 24 hours  chlorhexidine 4% Liquid 1 Application(s) Topical <User Schedule>  cholecalciferol 4000 Unit(s) Oral daily  dextrose 5% + sodium chloride 0.45% 1000 milliLiter(s) (75 mL/Hr) IV Continuous <Continuous>  dextrose 50% Injectable 12.5 Gram(s) IV Push once  dextrose 50% Injectable 25 Gram(s) IV Push once  dextrose 50% Injectable 25 Gram(s) IV Push once  doxycycline hyclate Capsule 100 milliGRAM(s) Oral every 12 hours  ferrous    sulfate 325 milliGRAM(s) Oral two times a day  heparin  Injectable 5000 Unit(s) SubCutaneous every 12 hours  levothyroxine 50 MICROGram(s) Oral daily  magnesium sulfate  IVPB 2 Gram(s) IV Intermittent every 6 hours  methadone    Tablet 80 milliGRAM(s) Oral every 6 hours  oxybutynin 10 milliGRAM(s) Oral two times a day  pantoprazole    Tablet 40 milliGRAM(s) Oral before breakfast    MEDICATIONS  (PRN):  dextrose 40% Gel 15 Gram(s) Oral once PRN Blood Glucose LESS THAN 70 milliGRAM(s)/deciLiter  diazepam    Tablet 5 milliGRAM(s) Oral two times a day PRN anxiety  glucagon  Injectable 1 milliGRAM(s) IntraMuscular once PRN Glucose <70 milliGRAM(s)/deciLiter      HOME MEDICATIONS:  Home Medications:  diazePAM 10 mg oral tablet: 1 tab(s) orally 2 times a day (26 Nov 2019 21:21)  levothyroxine 50 mcg (0.05 mg) oral tablet: 1 tab(s) orally once a day (26 Nov 2019 21:21)  methadone: 80 milligram(s) orally 4 times a day (26 Nov 2019 21:21)  oxybutynin 10 mg/24 hr oral tablet, extended release: 1 tab(s) orally 2 times a day (26 Nov 2019 21:21)  OxyCONTIN 60 mg oral tablet, extended release: 1 tab(s) orally 4 times a day (26 Nov 2019 21:21)      ROS:     REVIEW OF SYSTEMS  General:  No fevers  Eyes:  No reported pain   ENT:  No sore throat   NECK: No stiffness   CV:  No chest pain   Resp:  No shortness of breath  GI:  See HPI  :  No dysuria  Muscle:  +++  weakness  Neuro:  No tingling  Endocrine:  No polyuria  Heme:  No ecchymosis          VITALS:   T(F): 97.6 (11-29 @ 06:45), Max: 98.6 (11-27 @ 20:23)  HR: 88 (11-29 @ 06:45) (63 - 103)  BP: 100/56 (11-29 @ 06:45) (83/51 - 198/150)  BP(mean): 0 (11-28 @ 12:32) (0 - 0)  RR: 16 (11-29 @ 06:45) (16 - 26)  SpO2: 96% (11-29 @ 06:45) (96% - 100%)    I&O's Summary    28 Nov 2019 07:01  -  29 Nov 2019 07:00  --------------------------------------------------------  IN: 360 mL / OUT: 1900 mL / NET: -1540 mL        PHYSICAL EXAM:  GENERAL:  Appears in no distress  HEENT:  Conjunctivae  anicteric  CHEST:  Full & symmetric excursion  HEART:  N S1, S2  ABDOMEN:  Soft, non-tender, non-distended,   EXTEREMITIES:  Left BKA  NEURO:  Alert, No asterixis         LABS:                        7.8    14.01 )-----------( 332      ( 29 Nov 2019 07:32 )             24.6     PT/INR - ( 26 Nov 2019 18:30 )  INR: 1.27          PTT - ( 26 Nov 2019 18:30 )  PTT:36.7   LIVER FUNCTIONS - ( 29 Nov 2019 07:32 )  Alb: 1.6 g/dL / Pro: 4.5 g/dL / ALK PHOS: 146 U/L / ALT: 12 U/L / AST: 10 U/L / GGT: x           11-29    137  |  102  |  52<H>  ----------------------------<  62<L>  4.5   |  21  |  4.2<HH>    Ca    7.6<L>      29 Nov 2019 07:32  Phos  6.2     11-29  Mg     1.3     11-29                    RADIOLOGY: GI HPI:  Mr. Booker is a 60 yo male patient with Pmhx of PVD s/p R AKA, paraplegia with multiple chronic bedsores and suprapubic catheter, HTN (not on any medication), CKD stage IV (not following with nephrology), hypothyroidism, opioid dependance, recent admission due to unresponsiveness, presents for 3-4 days of left foot coldness, pain and worsening ulcers.  Patient being treated for L Foot Gangrene and SILVINA. GI called for dysphagia. Pt reports having dysphagia since 2 years to solid mainly , intermittent, lately got worse associated with food regurgitation and weight loss loss of at least 40 pounds the last 2 years. worse with rice and pasta.  Pt also reports intermittent hematochezia   He denies hematemesis, melena, reflux, abdominal pain     Previous EGD: NOTHING ON RECORD      Previous colonoscopy: Nothing on record    PAST MEDICAL & SURGICAL HISTORY  Anemia  PAD (peripheral artery disease)  Hypertension  Suprapubic catheter  Pressure ulcer  Diabetes  Lumbar compression fracture  S/P below knee amputation, right  S/P debridement  Toe amputation status, right  H/O hernia repair        SOCIAL HISTORY:  smoker: + smoker  Alcohol: Non alcoholic  Drug: Denies use of drugs except Marijuana use     FAMILY HISTORY:  No pertinent family history in first degree relatives      ALLERGIES:  sulfamethizole (Unknown)      MEDICATIONS:  MEDICATIONS  (STANDING):  ascorbic acid 500 milliGRAM(s) Oral daily  calcium acetate 667 milliGRAM(s) Oral three times a day with meals  calcium carbonate    500 mG (Tums) Chewable 1 Tablet(s) Chew every 8 hours  cefTRIAXone   IVPB 1000 milliGRAM(s) IV Intermittent every 24 hours  chlorhexidine 4% Liquid 1 Application(s) Topical <User Schedule>  cholecalciferol 4000 Unit(s) Oral daily  dextrose 5% + sodium chloride 0.45% 1000 milliLiter(s) (75 mL/Hr) IV Continuous <Continuous>  dextrose 50% Injectable 12.5 Gram(s) IV Push once  dextrose 50% Injectable 25 Gram(s) IV Push once  dextrose 50% Injectable 25 Gram(s) IV Push once  doxycycline hyclate Capsule 100 milliGRAM(s) Oral every 12 hours  ferrous    sulfate 325 milliGRAM(s) Oral two times a day  heparin  Injectable 5000 Unit(s) SubCutaneous every 12 hours  levothyroxine 50 MICROGram(s) Oral daily  magnesium sulfate  IVPB 2 Gram(s) IV Intermittent every 6 hours  methadone    Tablet 80 milliGRAM(s) Oral every 6 hours  oxybutynin 10 milliGRAM(s) Oral two times a day  pantoprazole    Tablet 40 milliGRAM(s) Oral before breakfast    MEDICATIONS  (PRN):  dextrose 40% Gel 15 Gram(s) Oral once PRN Blood Glucose LESS THAN 70 milliGRAM(s)/deciLiter  diazepam    Tablet 5 milliGRAM(s) Oral two times a day PRN anxiety  glucagon  Injectable 1 milliGRAM(s) IntraMuscular once PRN Glucose <70 milliGRAM(s)/deciLiter      HOME MEDICATIONS:  Home Medications:  diazePAM 10 mg oral tablet: 1 tab(s) orally 2 times a day (26 Nov 2019 21:21)  levothyroxine 50 mcg (0.05 mg) oral tablet: 1 tab(s) orally once a day (26 Nov 2019 21:21)  methadone: 80 milligram(s) orally 4 times a day (26 Nov 2019 21:21)  oxybutynin 10 mg/24 hr oral tablet, extended release: 1 tab(s) orally 2 times a day (26 Nov 2019 21:21)  OxyCONTIN 60 mg oral tablet, extended release: 1 tab(s) orally 4 times a day (26 Nov 2019 21:21)      ROS:     REVIEW OF SYSTEMS  General:  No fevers  Eyes:  No reported pain   ENT:  No sore throat   NECK: No stiffness   CV:  No chest pain   Resp:  No shortness of breath  GI:  See HPI  :  No dysuria  Muscle:  +++  weakness  Neuro:  No tingling  Endocrine:  No polyuria  Heme:  No ecchymosis          VITALS:   T(F): 97.6 (11-29 @ 06:45), Max: 98.6 (11-27 @ 20:23)  HR: 88 (11-29 @ 06:45) (63 - 103)  BP: 100/56 (11-29 @ 06:45) (83/51 - 198/150)  BP(mean): 0 (11-28 @ 12:32) (0 - 0)  RR: 16 (11-29 @ 06:45) (16 - 26)  SpO2: 96% (11-29 @ 06:45) (96% - 100%)    I&O's Summary    28 Nov 2019 07:01  -  29 Nov 2019 07:00  --------------------------------------------------------  IN: 360 mL / OUT: 1900 mL / NET: -1540 mL        PHYSICAL EXAM:  GENERAL:  Appears in no distress  HEENT:  Conjunctivae  anicteric  CHEST:  Full & symmetric excursion  HEART:  N S1, S2  ABDOMEN:  Soft, non-tender, non-distended,   EXTEREMITIES:  Left BKA  NEURO:  Alert, No asterixis         LABS:                        7.8    14.01 )-----------( 332      ( 29 Nov 2019 07:32 )             24.6     PT/INR - ( 26 Nov 2019 18:30 )  INR: 1.27          PTT - ( 26 Nov 2019 18:30 )  PTT:36.7   LIVER FUNCTIONS - ( 29 Nov 2019 07:32 )  Alb: 1.6 g/dL / Pro: 4.5 g/dL / ALK PHOS: 146 U/L / ALT: 12 U/L / AST: 10 U/L / GGT: x           11-29    137  |  102  |  52<H>  ----------------------------<  62<L>  4.5   |  21  |  4.2<HH>    Ca    7.6<L>      29 Nov 2019 07:32  Phos  6.2     11-29  Mg     1.3     11-29

## 2019-11-29 NOTE — PROGRESS NOTE ADULT - ASSESSMENT
Mr. Booker is a 58 yo male patient with PMHx of PVD s/p R AKA, paraplegia with multiple chronic bedsores and suprapubic catheter, HTN (not on any medication), CKD stage IV (not following with nephrology), hypothyroidism, opioid dependance, recent admission due to unresponsiveness, presents for 3-4 days of left foot coldness, pain and worsening ulcers.    # Chronic left foot peripheral artery disease + dry gangrene  - Patient with chronic wound of left foot with gangrenous changes.   - Doppler US showed Moderate disease in the left SFA, No flow noted in the left posterior tibial artery   - Patient was not on any antiplatelets or statin.   - Evaluated by vascular surgery, plan for angiogram on Friday  - Cardio consulted by vascular for, patient for possible L BKA on Monday   - f/u Echo results  - will keep on maintenance fluids, please hold off if O2 sat <92 percent    # Poor PO intake, difficulty swallowing  - Patient with occasional difficulty swallowing at home  - dysphagia likely esophageal  - Speech and Swallow:  recommends GI consult    - Hypotension- possible sepsis  - (tachycardia + leukocytosis) on admission, leucocytosis today  - will make Valium PRN due to low bp  - s/p 1 unit pRBC 11/27/19 and 250 cc NS bolus x 2   - Will avoid vancomycin given SILVINA on CKD and patient refusing dialysis.   - Will continue ceftriaxone + doxycycline.  - Does not show any clinical signs of hypoperfusion  - STAT CXR: Unremarkable  - If patient continues to be hypotensive, consider ICU consult and start Levophed   - F/u CBC in am, f/u ABG    # SILVINA on CKD + HAGMA  - non- oliguric  - Patient with serum creatinine 4.8 up from 3.7  - Patient refusing dialysis  - ABG shows acidosis,   - d5%-1/2ns    - Consider LINDY and Epogen  - C/w d5 1/2NS with 2amps of bicarb   - will repeat ABG and do a lactate, monitor for resolution of hagma  - f/u Phos, f/u Mag, f/u bmp    # Normocytic anemia  - Possibly due to CKD, and multifactorial  - s/p 1 unit pRBC on 11/27  - Patient denies any melena, or hematochezia, noted history of hemorrhoids  - Will trend CBC    # HTN  - not on any medication  - off Amlodipine    # Opioid dependance  - Patient on methadone 80 mg QID +  - Oxycontin 45 mg QID PRN  - s/p Narcan yesterday  - high risk for overdose given ckd    # Vitamin D def  - 4000 qd daily    # Hypomagnesemia  - will replete    # Chronic sacral bedsore.  - Followed by Dr. Gooden    # Hypothyroidism  - Will continue levothyroxine 50 mcg daily

## 2019-11-29 NOTE — CONSULT NOTE ADULT - ASSESSMENT
Mr. Booker is a 58 yo male patient with Pmhx of PVD s/p R AKA, paraplegia with multiple chronic bedsores and suprapubic catheter, HTN (not on any medication), CKD stage IV (not following with nephrology), hypothyroidism, opioid dependance, recent admission due to unresponsiveness, presents for 3-4 days of left foot coldness, pain and worsening ulcers.  Patient being treated for L Foot Gangrene and SILVINA. GI called for dysphagia.    # Intermittent Dysphagia to solid rule out malignancy vs esophageal web/ stricture/ zencker   Pt is hypotensive and has high wbc   Hg stable , and no signs of active GI bleed   Rec:   Pt should get EGD at some point but given he is hypotensive and has questionable sepsis will hold on non emergent intervention ( risks>> benefits)   Get Barium esophagram   Speech and swallow follow up   Needs colonoscopy that can be done as outpatient Mr. Booker is a 58 yo male patient with Pmhx of PVD s/p R AKA, paraplegia with multiple chronic bedsores and suprapubic catheter, HTN (not on any medication), CKD stage IV (not following with nephrology), hypothyroidism, opioid dependance, recent admission due to unresponsiveness, presents for 3-4 days of left foot coldness, pain and worsening ulcers.  Patient being treated for L Foot Gangrene and SILVINA. GI called for dysphagia.    # Intermittent Dysphagia to solid rule out malignancy vs esophageal web/ stricture/ zencker   Pt is hypotensive and has high wbc   Hg stable , and no signs of active GI bleed   Rec:   Pt should get EGD at some point but given he is hypotensive and has questionable sepsis will hold on non emergent intervention ( risks>> benefits) until more stable   Speech and swallow follow up   Needs colonoscopy that can be done as outpatient Mr. Booker is a 60 yo male patient with Pmhx of PVD s/p R AKA, paraplegia with multiple chronic bedsores and suprapubic catheter, HTN (not on any medication), CKD stage IV (not following with nephrology), hypothyroidism, opioid dependance, recent admission due to unresponsiveness, presents for 3-4 days of left foot coldness, pain and worsening ulcers.  Patient being treated for L Foot Gangrene and SILVINA. GI called for dysphagia.    # Intermittent Dysphagia to solid rule out malignancy vs esophageal web/ stricture/ zencker   Pt is hypotensive and has high wbc   Hg stable , and no signs of active GI bleed   Rec:   Pt would benefits from EGD but given that he is hypotensive and has questionable sepsis will hold  off for now ( risks>> benefits) until more stable   consider barium esophagram  PPI BID  Speech and swallow follow up   Needs colonoscopy that can be done as outpatient

## 2019-11-29 NOTE — PROGRESS NOTE ADULT - ATTENDING COMMENTS
I saw and evaluated patient  by bedside, more awake today, yesterday pt. required dose of narcan due to oversedation. still on dextrose infusion due to hypoglycemia events.    All labs, radiology studies, VS was reviewed  I have reviewed the resident's note and agree with documented findings and  plan of care.    Mr. Booker is a 60 yo male patient with PMHx of PVD s/p Right AKA, paraplegia with multiple chronic bedsores and chronic  suprapubic catheter, HTN (not on any medication), CKD stage IV (not following with nephrology), hypothyroidism, opioid dependance, recent admission due to unresponsiveness, presents for 3-4 days of left foot coldness, pain and worsening ulcers.    # Chronic left foot peripheral artery disease + dry gangrene  - Patient with chronic wound of left foot with gangrenous changes.   - Doppler US showed Moderate disease in the left SFA, No flow noted in the left posterior tibial artery   - Patient was not on any antiplatelets or statin.   - Evaluated by vascular surgery, plan for angiogram on Friday  - Cardio consulted by vascular for, patient for possible L BKA on Monday .  - will keep on maintenance fluids  - Will continue ceftriaxone + doxycycline- f/up ID  recommendations    # hypoglycemia-continue D5with bicarb. infusion., monitor bsfs.      # Poor PO intake, difficulty swallowing  - Patient with occasional difficulty swallowing at home  - dysphagia diet - with speech tx. f/up    - Hypotension- no sepsis  -patient looks over sedated with opioids and anxiolytic therapy- decrease diazepam to 5 mg po twice daily as needed for anxiety episodes.      Anemia due to Iron deficiency and CKD  - s/p 1 unit pRBC 11/27/19    -h/h remains low stable, started on PO iron tx.  -obtain folate and vitamin B 12 levels    # SILVINA on CKD stage 4 + HAGMA  - Patient with serum creatinine 4.2 up from 3.7  11-29    137  |  102  |  52<H>  ----------------------------<  62<L>  4.5   |  21  |  4.2<HH>      - Patient refusing dialysis, or following with nephrologist.   -Nephrology on Board.  - on sodium bicarb- 2 amps  in D5W at 75 ml/hr-with BMP f/up in am  - started phoslo,   - Consider LINDY and Epogen  -f/up daily magnesium and phos levels  -avoid nephrotoxic agents.      #h/o  HTN- now borderline low b/p  -continue to monitor VS    # Opioid dependance  - Patient on methadone 80 mg QID + Oxycontin 60 mg QID ( oversedated- ? how pt. can be on both)- need to get pain management consult- d/c oxycodone.      # Chronic sacral bedsore.  - Followed by Dr. Gooden    # Hypothyroidism  - Will continue levothyroxine 50 mcg daily  -obtain thyroid profile    # Severe Vitamin D deficiency- on vitamin D tx.    daily DVT proph.    #Progress Note Handoff: monitor bsfs, consult pain management ? dual opioid and methadone tx., f/up vascular, monitor BMP for metabolic acidosis and SILVINA.  Family discussion: medical plan of tx. d/w pt. Disposition: to be determined

## 2019-11-29 NOTE — PROGRESS NOTE ADULT - ASSESSMENT
59/M with SILVINA on CKD 4 (baseline creat 3.8 mg% Oct 2019) SPC, severe PVD, presented with cold Lt LE and ulcers, found to have severe anemia Hb 6.8 and acidosis.    SILVINA on CKD 4  prerenal vs ATN (ischemic) severe hypotension  - non oliguric  - on d5w with 2 amps bicarbonate IVF keep for now   - strict Is and OS  - send UA Urine Na BUN creat  HAG MEt acidosis -cont current IVF   High phos - cont phoslo, repeat phos, check iPTH  Dysphagia - speech and swallow  Anemia Tsat 30, chek ferritin, blood Tx, will need Procrit later after resolution of left lower ext issues   Pt never wanted HD, monitor closely volume status, stabilize BP  - high risk for GOLDIE and high risk for worsening renal function if surgery is contemplated. Discussed with patient in length at bedside / patient is reluctant to be on HD   will follow

## 2019-11-29 NOTE — SWALLOW BEDSIDE ASSESSMENT ADULT - SLP GENERAL OBSERVATIONS
Pt received sitting upright in bed, alert and oriented x3, no c/o pain. +room air. Pt reporting food occasionally getting stuck in mid sternal area.

## 2019-11-29 NOTE — SWALLOW BEDSIDE ASSESSMENT ADULT - NS SPL SWALLOW CLINIC TRIAL FT
pt refused all other trials despite max encouragement.
+toleration w/o overt s/s penetration/aspiration w/ thin liquids

## 2019-11-29 NOTE — SWALLOW BEDSIDE ASSESSMENT ADULT - SWALLOW EVAL: DIAGNOSIS
+toleration for a couple of sips of ginger ale w/o overt s/s aspiration/penetration; +nausea following PO; pt refused all other trials
+toleration for thin liquids w/o overt s/s penetration/aspiration. Pt w/ immediate vomiting post trials of thin liquids. RN and MD aware. No solid trials presented, however symptoms appear to be r/t possible presence esophageal dysphagia.

## 2019-11-29 NOTE — CONSULT NOTE ADULT - ASSESSMENT
REVIEW OF SYSTEMS    General:	NAD   Skin/Breast:	Neg  Ophthalmologic:	Neg  ENMT: Neg	  Respiratory and Thorax: Neg	  Cardiovascular:	Neg  Gastrointestinal:	Neg  Genitourinary:	Neg  Musculoskeletal:	Neg  Neurological:	Neg  Psychiatric:	Neg  Hematology/Lymphatics:	Neg  Endocrine:	Neg        PHYSICAL EXAM:    GENERAL: NAD, well-groomed, well-developed  HEAD:  Atraumatic, Normocephalic  EYES: EOMI, PERRLA, conjunctiva and sclera clear  ENMT: No tonsillar erythema, exudates, or enlargement; Moist mucous membranes, Good dentition, No lesions  NECK: Supple, No JVD, Normal thyroid  NERVOUS SYSTEM:  Alert & Oriented X3, Good concentration  CHEST/LUNG: Clear to percussion bilaterally; No rales, rhonchi, wheezing, or rubs  HEART: Regular rate and rhythm; No murmurs, rubs, or gallops  ABDOMEN: Soft, Nontender, Nondistended; Bowel sounds present  EXTREMITIES:  No clubbing, cyanosis, or edema  LYMPH: No lymphadenopathy noted  SKIN: No rashes or lesions      Patient lying in bed supine. Patient states his bedsores are very painful at 10/10 now. Patient had a rapid response code yesterday due to being very somnolent. Patient admits to taking extra percocets from his home meds.  Last BS >200 today. patient is A&Ox3. Assures he not take any more of his own meds as I explained the risk of overdose. Patient understood. Will continue to f/u.

## 2019-11-29 NOTE — CONSULT NOTE ADULT - PROBLEM SELECTOR RECOMMENDATION 9
Con't if home dose, if not then DC. diazepam    Tablet 5 milliGRAM(s) Oral two times a day PRN  Con't methadone    Tablet 80 milliGRAM(s) Oral every 6 hours  DC oxycodone    5 mG/acetaminophen 325 mG 1 Tablet(s) Oral every 6 hours PRN  Start Oxycodone IR 20mg PO Q4hrs PRN   Start Acetaminophen 650mg PO Q6hrs Standing  Call [phone] for any uncontrolled pain or adverse reactions

## 2019-11-29 NOTE — SWALLOW BEDSIDE ASSESSMENT ADULT - SLP PERTINENT HISTORY OF CURRENT PROBLEM
pt admitted for cold LLE. PMHx: PVD s/p R AKA, paraplegia with multiple chronic bedsores and suprapubic catheter, HTN (not on any medication), CKD stage IV (not following with nephrology), hypothyroidism, opioid dependance; pt being treated for Chronic left foot peripheral artery disease + dry gangrene; SLP c/s 2' reported occasional difficulty swallowing at home and chronic poor PO intake.; pt known to SLP dept from previous admissions w/ recs for regular w/ thins.

## 2019-11-29 NOTE — CONSULT NOTE ADULT - SUBJECTIVE AND OBJECTIVE BOX
Chief Complaint:         Mr. Booker is a 60 yo male patient with Pmhx of PVD s/p R AKA, paraplegia with multiple chronic bedsores and suprapubic catheter, HTN (not on any medication), CKD stage IV (not following with nephrology), hypothyroidism. Patient with complaint of bedsores to the lower back and buttocks which are the source of his pain.       Allergies    sulfamethizole (Unknown)    Intolerances        PAST MEDICAL & SURGICAL HISTORY:  Anemia  PAD (peripheral artery disease)  Hypertension  Suprapubic catheter  Pressure ulcer  Diabetes  Lumbar compression fracture  S/P below knee amputation, right  S/P debridement  Toe amputation status, right  H/O hernia repair        SOCIAL HISTORY:  Denies Smoking, Alcohol, or Drug Use    sulfamethizole (Unknown)      PAIN MEDICATIONS:  diazepam    Tablet 5 milliGRAM(s) Oral two times a day PRN  methadone    Tablet 80 milliGRAM(s) Oral every 6 hours  oxycodone    5 mG/acetaminophen 325 mG 1 Tablet(s) Oral every 6 hours PRN    Heme:  heparin  Injectable 5000 Unit(s) SubCutaneous every 12 hours    Antibiotics:  cefTRIAXone   IVPB 1000 milliGRAM(s) IV Intermittent every 24 hours  doxycycline hyclate Capsule 100 milliGRAM(s) Oral every 12 hours    Cardiovascular:    GI:  calcium carbonate    500 mG (Tums) Chewable 1 Tablet(s) Chew every 8 hours  pantoprazole    Tablet 40 milliGRAM(s) Oral before breakfast    Endocrine:  dextrose 40% Gel 15 Gram(s) Oral once PRN  dextrose 50% Injectable 12.5 Gram(s) IV Push once  dextrose 50% Injectable 25 Gram(s) IV Push once  dextrose 50% Injectable 25 Gram(s) IV Push once  glucagon  Injectable 1 milliGRAM(s) IntraMuscular once PRN  levothyroxine 50 MICROGram(s) Oral daily    All Other Medications:  ascorbic acid 500 milliGRAM(s) Oral daily  calcium acetate 667 milliGRAM(s) Oral three times a day with meals  chlorhexidine 4% Liquid 1 Application(s) Topical <User Schedule>  cholecalciferol 4000 Unit(s) Oral daily  dextrose 5% + sodium chloride 0.45% 1000 milliLiter(s) IV Continuous <Continuous>  ferrous    sulfate 325 milliGRAM(s) Oral two times a day  magnesium sulfate  IVPB 2 Gram(s) IV Intermittent every 6 hours  oxybutynin 10 milliGRAM(s) Oral two times a day      Vital Signs Last 24 Hrs  T(C): 37.6 (29 Nov 2019 13:11), Max: 37.6 (29 Nov 2019 13:11)  T(F): 99.6 (29 Nov 2019 13:11), Max: 99.6 (29 Nov 2019 13:11)  HR: 84 (29 Nov 2019 13:11) (63 - 90)  BP: 99/58 (29 Nov 2019 13:11) (96/61 - 119/75)  BP(mean): --  RR: 18 (29 Nov 2019 13:11) (16 - 18)  SpO2: 96% (29 Nov 2019 06:45) (96% - 96%)      11-28-19 @ 07:01  -  11-29-19 @ 07:00  --------------------------------------------------------  IN: 360 mL / OUT: 1900 mL / NET: -1540 mL        LABS:                          7.8    14.01 )-----------( 332      ( 29 Nov 2019 07:32 )             24.6     11-29    137  |  102  |  52<H>  ----------------------------<  62<L>  4.5   |  21  |  4.2<HH>    Ca    7.6<L>      29 Nov 2019 07:32  Phos  6.2     11-29  Mg     1.3     11-29    TPro  4.5<L>  /  Alb  1.6<L>  /  TBili  <0.2  /  DBili  x   /  AST  10  /  ALT  12  /  AlkPhos  146<H>  11-29          RADIOLOGY:  EXAM:  CT ABDOMEN AND PELVIS            PROCEDURE DATE:  09/09/2019            INTERPRETATION:  CLINICAL STATEMENT: Renal failure. UTI.      TECHNIQUE: Contiguous CT images were obtained of the abdomen and pelvis.  Intravenous Contrast: None.    Oral contrast was not administered.          COMPARISON:  CT abdomen pelvis dated 8/15/2018    FINDINGS:    Lack of intravenous and oral contrast degrades evaluation. Streak   artifact from spinal rods further degrades evaluation.    LOWER CHEST: Small bilateral pleural effusions with compressive   atelectasis. Interlobular septal thickening. Coronary artery, aortic and   mitral annular calcifications.    LIVER: Unremarkable.    SPLEEN: Unremarkable.    PANCREAS: Unremarkable.    GALLBLADDER AND BILIARY TREE: Unremarkable CT appearance of the   gallbladder. No biliary ductal dilatation.    ADRENALS: Unremarkable.    KIDNEYS: No hydronephrosis or evidence of ureteral calculus. Punctate 2   mm nonobstructing right renal calculi.    LYMPH NODES: Bilateral prominent pelvic lymph nodes redemonstrated, not   significantly changed    VASCULATURE: Normal caliber abdominal aorta with diffuse vascular   calcifications    BOWEL: No bowel obstruction. Unremarkable appendix. Underdistention   versus proximal ascending colon wall thickening.    PERITONEUM/RETROPERITONEUM/MESENTERY: There is no ascites or   pneumoperitoneum.    PELVIC VISCERA: Urinary bladder wall thickening. Bladder is collapsed   around a suprapubic catheter    BONES AND SOFT TISSUES: Chronic decubitus ulcers overlying bilateral   ischial tuberosities and sacrum with likely chronic osteitis. Chronic L1   compression fracture. Spinal rods. Muscle atrophy.    IMPRESSION:    Urinary bladder wall thickening despite collapsed around a suprapubic   catheter.  No hydronephrosis or evidence of ureteral calculus. Punctate   nonobstructing right renal calculi.    Small bilateral pleural effusions with compressive atelectasis.    Likely underdistention versus proximal ascending colonic wall thickening.   A mild colitis is not excluded.    Chronic decubitus ulcers with presumably chronic underlying osteitis.      EDITA AMAYA M.D., ATTENDING RADIOLOGIST  This document has been electronically signed. Sep  9 2019 11:37PM        Drug Screen:        [ ]  [place]  Reviewed on

## 2019-11-30 NOTE — PROGRESS NOTE ADULT - ASSESSMENT
Mr. Booker is a 60 yo male patient with PMHx of PVD s/p R AKA, paraplegia with multiple chronic bedsores and suprapubic catheter, HTN (not on any medication), CKD stage IV (not following with nephrology), hypothyroidism, opioid dependance, recent admission due to unresponsiveness, presents for 3-4 days of left foot coldness, pain and worsening ulcers.    # Chronic left foot peripheral artery disease + dry gangrene  - Patient with chronic wound of left foot with gangrenous changes.   - Doppler US showed Moderate disease in the left SFA, No flow noted in the left posterior tibial artery   - Patient was not on any antiplatelets or statin.   - Evaluated by vascular surgery, plan for angiogram on Friday  - Cardio consulted by vascular for, patient for possible L BKA on Monday   - f/u Echo results  - will keep on maintenance fluids, please hold off if O2 sat <92 percent    # Hypothyroidism  - will need to re-check thyroid in 2 weeks  - antibody work up sent  - Thyroids ultrasounds ordered  - Will increase levothyroxine to 100 mcg daily    # Poor PO intake, difficulty swallowing  - Patient with occasional difficulty swallowing at home  - dysphagia likely esophageal  - Speech and Swallow:  recommends GI consult    - Hypotension- possible sepsis  - (tachycardia + leukocytosis) on admission, leucocytosis today  - will make Valium PRN due to low bp  - s/p 1 unit pRBC 11/27/19 and 250 cc NS bolus x 2   - Will avoid vancomycin given SILVINA on CKD and patient refusing dialysis.   - Will continue ceftriaxone + doxycycline.  - Does not show any clinical signs of hypoperfusion  - STAT CXR: Unremarkable  - If patient continues to be hypotensive, consider ICU consult and start Levophed   - F/u CBC in am, f/u ABG    # SILVINA on CKD + HAGMA  - non- oliguric  - Patient with serum creatinine 4.8 up from 3.7  - Patient refusing dialysis  - ABG shows acidosis,   - Consider LINDY and Epogen  - C/w d5 1/2NS with 2amps of bicarb , anion gap closing  - f/u Phos, f/u Mag, f/u bmp    # Normocytic anemia  - Possibly due to CKD, and multifactorial  - s/p 1 unit pRBC on 11/27  - Patient denies any melena, or hematochezia, noted history of hemorrhoids  - Will trend CBC    # HTN  - not on any medication  - off Amlodipine    # Opioid dependance  - Patient on methadone 80 mg QID +  - Start Oxycodone IR 20mg PO Q4hrs PRN ( will reduce to Q8hrs as per attending)   - Start Acetaminophen 650mg PO Q6hrs Standing  - s/p Narcan on 11/28  - high risk for overdose given ckd  - f/u pain managment recs as per Dr. Jackson    # Folate acid deficiency  - will start folic acid po qd    # Vitamin D def  - 4000 qd daily    # Hypomagnesemia  - will replete    # Chronic sacral bedsore.  - Followed by Dr. Gooden

## 2019-11-30 NOTE — CONSULT NOTE ADULT - SUBJECTIVE AND OBJECTIVE BOX
SHAYNE, EDWARD  59y, Male  Allergy: sulfamethizole (Unknown)      CHIEF COMPLAINT: Cold left lower extremity (29 Nov 2019 16:06)      HPI:  Mr. Bella is a 58 yo male patient with Pmhx of PVD s/p R AKA, paraplegia with multiple chronic bedsores and suprapubic catheter, HTN (not on any medication), CKD stage IV (not following with nephrology), hypothyroidism, opioid dependance, recent admission due to unresponsiveness, presents for 3-4 days of left foot coldness, pain and worsening ulcers.    Patient went today to see Dr. Gooden who sent patient to ED. He noted that he has had left foot ulcers for the last 5 years, course was waxing and waning and was stable with dressing. Lately he felt his wound is getting worse and his foot felt cold. Denies any fever, chills. Patient denying any cp or sob. No GI or  complaints.    In ED, patient afebrile, tachycardic hypertensive. He was given 2L of LR, and one dose of vancomycin. Patient was seen by vascular surgery and arterial doppler US of the right LE was done. Patient will be admitted to the medical service. (26 Nov 2019 21:35)    FAMILY HISTORY:  No pertinent family history in first degree relatives    PAST MEDICAL & SURGICAL HISTORY:  Anemia  PAD (peripheral artery disease)  Hypertension  Suprapubic catheter  Pressure ulcer  Diabetes  Lumbar compression fracture  S/P below knee amputation, right  S/P debridement  Toe amputation status, right  H/O hernia repair      Substance Use (  ) never used  (  ) IVDU (  ) Other: opiates  Tobacco Usage:  (   ) never smoked   ( x  ) former smoker   (   ) current smoker   Alcohol Usage: (   ) social  (   ) daily use (  x ) denies  Sexual History: na      ROS  General: Denies fevers, chills, nightsweats, weight loss  HEENT: Denies headache, rhinorrhea, sore throat, eye pain  CV: Denies CP, palpitations  PULM: Denies SOB, cough  GI: Denies abdominal pain, diarrhea  : Denies dysuria, hematuria  MSK: Denies arthralgias  SKIN: Denies rash   NEURO: Denies paresthesias, weakness  PSYCH: Denies depression    VITALS:  T(F): 98.3, Max: 99.6 (11-29-19 @ 13:11)  HR: 89  BP: 123/65  RR: 18Vital Signs Last 24 Hrs  T(C): 36.8 (30 Nov 2019 05:39), Max: 37.6 (29 Nov 2019 13:11)  T(F): 98.3 (30 Nov 2019 05:39), Max: 99.6 (29 Nov 2019 13:11)  HR: 89 (30 Nov 2019 05:39) (83 - 89)  BP: 123/65 (30 Nov 2019 05:39) (99/58 - 123/65)  BP(mean): --  RR: 18 (30 Nov 2019 05:39) (18 - 18)  SpO2: --    PHYSICAL EXAM:  Gen: NAD, resting in bed  HEENT: Normocephalic, atraumatic  Neck: supple, no lymphadenopathy  CV: Regular rate & regular rhythm  Lungs: decreased BS at bases, no fremitus  Abdomen: Soft, BS present  Ext: Right BKA, left cold foot with heel ulcer  Neuro: non focal, awake      TESTS & MEASUREMENTS:                        7.8    14.01 )-----------( 332      ( 29 Nov 2019 07:32 )             24.6     11-29    137  |  102  |  52<H>  ----------------------------<  62<L>  4.5   |  21  |  4.2<HH>    Ca    7.6<L>      29 Nov 2019 07:32  Phos  6.2     11-29  Mg     1.3     11-29    TPro  4.5<L>  /  Alb  1.6<L>  /  TBili  <0.2  /  DBili  x   /  AST  10  /  ALT  12  /  AlkPhos  146<H>  11-29      LIVER FUNCTIONS - ( 29 Nov 2019 07:32 )  Alb: 1.6 g/dL / Pro: 4.5 g/dL / ALK PHOS: 146 U/L / ALT: 12 U/L / AST: 10 U/L / GGT: x               Culture - Blood (collected 11-27-19 @ 00:30)  Source: .Blood Blood-Peripheral  Preliminary Report (11-28-19 @ 07:01):    No growth to date.        Lactate, Blood: 0.9 mmol/L (11-28-19 @ 01:25)      INFECTIOUS DISEASES TESTING  Hepatitis B Surface Antigen: Nonreact (07-06-19 @ 07:13)      RADIOLOGY & ADDITIONAL TESTS:  I have personally reviewed the last Chest xray  CXR      CT      CARDIOLOGY TESTING  Transthoracic Echocardiogram:    EXAM:  2-D ECHO (TTE) COMPLETE        PROCEDURE DATE:  11/29/2019      INTERPRETATION:  REPORT:    TRANSTHORACIC ECHOCARDIOGRAM REPORT         Patient Name:   MARGE BELLA Accession #: [id number]  Medical Rec #:  [id number]   Height:      73.0 in 185.4 cm  YOB: 1960  Weight:      160.0 lb 72.57 kg  Patient Age:    59 years   BSA:         1.96 m²  Patient Gender: M          BP:          100/56 mmHg       Date of Exam:        11/29/2019 9:12:46 AM  Referring Physician: [id number] RANCHO REGALADO  Sonographer:         [email or web address]  Reading Physician:   Louie Rojas M.D.    Procedure:     2D Echo/Doppler/Color Doppler Complete.  Indications:   I25.9 - Chronic Ischemic Heart Disease, unspecified  Diagnosis:     Chronic ischemic heart disease, unspecified - I25.9  Study Details: Technically fair study.         Summary:   1. Left ventricular ejection fraction, by visual estimation, is 55 to   60%.   2. Normal global left ventricular systolic function.   3. Spectral Doppler shows impaired relaxation pattern of left   ventricular myocardial filling (Grade I diastolic dysfunction).   4. Normal right atrial size.   5. Trivial pericardial effusion.   6. Mild thickening and calcification of the anterior and posterior   mitral valve leaflets.   7.Moderate mitral annular calcification.   8. Sclerotic aortic valve with normal opening.    PHYSICIAN INTERPRETATION:  Left Ventricle: The left ventricular internal cavity size is normal. Left   ventricular wall thickness is normal. Global LV systolicfunction was   normal. Left ventricular ejection fraction, by visual estimation, is 55   to 60%. Spectral Doppler shows impaired relaxation pattern of left   ventricular myocardial filling (Grade I diastolic dysfunction).  Right Ventricle: The right ventricular size is normal. RV systolic   function is normal.  Left Atrium: Normal left atrial size.  Right Atrium: Normal right atrial size.  Pericardium: Trivial pericardial effusion is present.  Mitral Valve: Mild thickening and calcification of the anterior and   posterior mitral valve leaflets. There is moderate mitral annular   calcification. Trace mitral valve regurgitation is seen.  Tricuspid Valve: The tricuspid valve is normal in structure. No tricuspid   regurgitation is visualized.  Aortic Valve: The aortic valve is trileaflet. No evidence of aortic   stenosis. Sclerotic aortic valve with normal opening. No evidence of   aortic valve regurgitation is seen.  Pulmonic Valve: The pulmonic valve is thickened with good excursion. No   indication of pulmonic valve regurgitation.  Aorta: The aortic root is normal in size and structure.  Venous: The inferior vena cava is normal.       2D AND M-MODE MEASUREMENTS (normal ranges within parentheses):  Left                  Normal   Aorta/Left             Normal  Ventricle:                     Atrium:  IVSd (2D):  1.12 cm  (0.7-1.1) AoV Cusp       1.31  (1.5-2.6)  LVPWd       1.24 cm  (0.7-1.1) Separation:     cm  (2D):                          Left Atrium    3.46  (1.9-4.0)  LVIDd 4.07 cm  (3.4-5.7) (Mmode):        cm  (2D):                          Right  LVIDs       3.19 cm            Ventricle:  (2D):                          RVd (2D):      2.65 cm  LV FS       21.5 %    (>25%)  (2D):  IVSd        1.12 cm  (0.7-1.1)  (Mmode):  LVPWd       0.82 cm  (0.7-1.1)  (Mmode):  LVIDd       4.68 cm  (3.4-5.7)  (Mmode):  LVIDs       2.74 cm  (Mmode):  LV FS       41.5 %    (>25%)  (Mmode):  Relative     0.61     (<0.42)  Wall  Thickness  Rel. Wall    0.35     (<0.42)  Thickness  Mm  LV Mass    80.4 g/m²  Index:  Mmode    SPECTRAL DOPPLER ANALYSIS:  LV DIASTOLIC FUNCTION:  MV Peak E: 0.94 m/s Decel Time: 183 msec  MV Peak A: 1.33 m/s  E/A Ratio: 0.71    Aortic Valve:  AoV VMax:    1.46 m/s  AoV Pk Grad: 8.5 mmHg    LVOT Diam: 1.97 cm    Mitral Valve:  MV P1/2 Time: 53.07 msec  MV Area, PHT: 4.15 cm²       [id number] Louie Rojas M.D., Electronically signed on 11/29/2019 at   10:02:00 AM              *** Final ***                    LOUIE ROJAS MD  This document has been electronically signed. Nov 29 2019  9:12AM             (11-29-19 @ 09:12)  12 Lead ECG:   Ventricular Rate 81 BPM    Atrial Rate 81 BPM    P-R Interval 172 ms    QRS Duration 94 ms    Q-T Interval 398 ms    QTC Calculation(Bezet) 462 ms    P Axis 69 degrees    R Axis 15 degrees    T Axis 13 degrees    Diagnosis Line Normal sinus rhythm  Normal ECG    Confirmed by Sesar Lowe ([id number]) on 11/27/2019 6:26:25 AM (11-26-19 @ 20:36)      MEDICATIONS  acetaminophen   Tablet .. 650  ascorbic acid 500  calcium acetate 667  calcium carbonate    500 mG (Tums) Chewable 1  cefTRIAXone   IVPB 1000  chlorhexidine 4% Liquid 1  cholecalciferol 4000  dextrose 5% + sodium chloride 0.45% 1000  dextrose 50% Injectable 12.5  dextrose 50% Injectable 25  dextrose 50% Injectable 25  doxycycline hyclate Capsule 100  ferrous    sulfate 325  heparin  Injectable 5000  levothyroxine 50  magnesium sulfate  IVPB 2  methadone    Tablet 80  oxybutynin 10  pantoprazole    Tablet 40      ANTIBIOTICS:  cefTRIAXone   IVPB 1000 milliGRAM(s) IV Intermittent every 24 hours  doxycycline hyclate Capsule 100 milliGRAM(s) Oral every 12 hours      All available historical data has been reviewed

## 2019-11-30 NOTE — PROGRESS NOTE ADULT - ASSESSMENT
59/M with SILVINA on CKD 4 (baseline creat 3.8 mg% Oct 2019) SPC, severe PVD, presented with cold Lt LE and ulcers, found to have severe anemia Hb 6.8 and acidosis.    SILVINA on CKD 4  prerenal vs ATN (ischemic) severe hypotension  - non oliguric/ creat stable for now   - on d5w with 2 amps bicarbonate IVF keep for now   - strict Is and OS  - send UA Urine Na BUN creat  HAG MEt acidosis -cont current IVF / bicarb betetr   High phos - cont phoslo, repeat phos, check iPTH  Dysphagia - speech and swallow  Anemia Tsat 30, chek ferritin, blood Tx, will need Procrit later after resolution of left lower ext issues   Pt never wanted HD, monitor closely volume status, stabilize BP  - high risk for GOLDIE and high risk for worsening renal function if surgery is contemplated. Discussed with patient in length at bedside / patient is reluctant to be on HD   will follow

## 2019-11-30 NOTE — PROGRESS NOTE ADULT - ATTENDING COMMENTS
I saw and evaluated patient  by bedside, no active complains today , tolerating diet.  no fever.   All labs, radiology studies, VS was reviewed  I have reviewed the resident's note and agree with documented findings and  plan of care.    Mr. Booker is a 60 yo male patient with PMHx of PVD s/p Right AKA, paraplegia with multiple chronic bedsores and chronic  suprapubic catheter, HTN (not on any medication), CKD stage IV (not following with nephrology), hypothyroidism, opioid dependance, recent admission due to unresponsiveness, presents for 3-4 days of left foot coldness, pain and worsening ulcers.    # Chronic left foot peripheral artery disease + dry gangrene  - Patient with chronic wound of left foot with gangrenous changes.   - Doppler US showed Moderate disease in the left SFA, No flow noted in the left posterior tibial artery   - Patient was not on any antiplatelets or statin.   - Evaluated by vascular surgery, they are no longer planning to do diagnostic  angiogram  - Cardio consulted by vascular for, patient for possible L BKA on Monday .  - will keep on maintenance fluids  - Will continue ceftriaxone + doxycycline- as per  ID  recommendations    # hypoglycemia-continue D5with bicarb. infusion., monitor bsfs.      # Poor PO intake, difficulty swallowing  - Patient with occasional difficulty swallowing at home  - dysphagia diet - with speech tx. f/up- obtain barium esophagogram    - Hypotension- resolved post decrease of opioids dose tx.      Anemia due to Iron deficiency and CKD  - s/p 1 unit pRBC 11/27/19    -h/h remains low stable, started on PO iron tx.  -obtain folate- borderline low and vitamin B 12 level- normal  -added on daily folate tx.    # SILVINA on CKD stage 4 + HAGMA  - Patient with serum creatinine 4.2 up from 3.7  11-29    137  |  102  |  52<H>  ----------------------------<  62<L>  4.5   |  21  |  4.2<HH>  11-30    135  |  98  |  50<H>  ----------------------------<  97  4.1   |  22  |  4.1<HH>      - Patient refusing dialysis, or following with nephrologist.   -Nephrology on Board.  - on sodium bicarb- 2 amps  in D5W at 75 ml/hr-with BMP f/up in am  - started phoslo,   - Consider LINDY and Epogen  -f/up daily magnesium and phos levels  -avoid nephrotoxic agents.      #h/o  HTN- now normotensive  -continue to monitor VS    # Opioid dependance  - Patient on methadone 80 mg QID + Oxycontin 60 mg QID ( oversedated- ? how pt. can be on both)- obtained pain management consult- recommended to decrease Oxycodone dose to 20 mg  every 4 hours as needed for breakthrough pain- I believe this dose is  still too high , will decrease the frequency to every 8 hours as needed for breakthrough pain to avoid oversedation.      # Chronic sacral bedsore.  - Followed by Dr. Gooden    # Hypothyroidism- severe uncontrolled  - patient was on levothyroxine 50 mcg daily, increased to 100 MCG po once daily , will need to repeat thyroid profile in 2 weeks.  -obtain PTO abx, thyroglobulin level, US thyroid.      # Severe Vitamin D deficiency- on vitamin D tx.    daily DVT proph.    #Progress Note Handoff: monitor bsfs,  f/up vascular, monitor BMP for metabolic acidosis and SILVINA. f/up thyroid US, thyroid ab's  Family discussion: medical plan of tx. d/w pt. Disposition: to be determined .

## 2019-11-30 NOTE — PROGRESS NOTE ADULT - SUBJECTIVE AND OBJECTIVE BOX
SUBJ:No chest pain or shortness of breath      MEDICATIONS  (STANDING):  acetaminophen   Tablet .. 650 milliGRAM(s) Oral every 6 hours  ascorbic acid 500 milliGRAM(s) Oral daily  calcium acetate 667 milliGRAM(s) Oral three times a day with meals  calcium carbonate    500 mG (Tums) Chewable 1 Tablet(s) Chew every 8 hours  cefTRIAXone   IVPB 1000 milliGRAM(s) IV Intermittent every 24 hours  chlorhexidine 4% Liquid 1 Application(s) Topical <User Schedule>  cholecalciferol 4000 Unit(s) Oral daily  dextrose 5% + sodium chloride 0.45% 1000 milliLiter(s) (75 mL/Hr) IV Continuous <Continuous>  dextrose 50% Injectable 12.5 Gram(s) IV Push once  dextrose 50% Injectable 25 Gram(s) IV Push once  dextrose 50% Injectable 25 Gram(s) IV Push once  doxycycline hyclate Capsule 100 milliGRAM(s) Oral every 12 hours  ferrous    sulfate 325 milliGRAM(s) Oral two times a day  heparin  Injectable 5000 Unit(s) SubCutaneous every 12 hours  levothyroxine 50 MICROGram(s) Oral once  magnesium sulfate  IVPB 2 Gram(s) IV Intermittent every 6 hours  methadone    Tablet 80 milliGRAM(s) Oral every 6 hours  oxybutynin 10 milliGRAM(s) Oral two times a day  pantoprazole    Tablet 40 milliGRAM(s) Oral every 12 hours    MEDICATIONS  (PRN):  dextrose 40% Gel 15 Gram(s) Oral once PRN Blood Glucose LESS THAN 70 milliGRAM(s)/deciLiter  diazepam    Tablet 5 milliGRAM(s) Oral two times a day PRN anxiety  glucagon  Injectable 1 milliGRAM(s) IntraMuscular once PRN Glucose <70 milliGRAM(s)/deciLiter  oxyCODONE    IR 20 milliGRAM(s) Oral every 4 hours PRN Severe Pain (7 - 10)            Vital Signs Last 24 Hrs  T(C): 36.8 (30 Nov 2019 05:39), Max: 37.6 (29 Nov 2019 13:11)  T(F): 98.3 (30 Nov 2019 05:39), Max: 99.6 (29 Nov 2019 13:11)  HR: 89 (30 Nov 2019 05:39) (83 - 89)  BP: 123/65 (30 Nov 2019 05:39) (99/58 - 123/65)  BP(mean): --  RR: 18 (30 Nov 2019 05:39) (18 - 18)  SpO2: --      ECG:NML    TTE:    LABS:                        7.8    14.01 )-----------( 332      ( 29 Nov 2019 07:32 )             24.6     11-29    137  |  102  |  52<H>  ----------------------------<  62<L>  4.5   |  21  |  4.2<HH>    Ca    7.6<L>      29 Nov 2019 07:32  Phos  6.2     11-29  Mg     1.3     11-29    TPro  4.5<L>  /  Alb  1.6<L>  /  TBili  <0.2  /  DBili  x   /  AST  10  /  ALT  12  /  AlkPhos  146<H>  11-29            I&O's Summary    29 Nov 2019 07:01  -  30 Nov 2019 07:00  --------------------------------------------------------  IN: 0 mL / OUT: 1500 mL / NET: -1500 mL      BNP

## 2019-11-30 NOTE — CONSULT NOTE ADULT - ASSESSMENT
ASSESSMENT  59y M admitted with COLD FOOT WITH PERIPHERAL VASCULAR DISEASE    HPI:  Mr. Booker is a 58 yo male patient with Pmhx of PVD s/p R AKA, paraplegia with multiple chronic bedsores and suprapubic catheter, HTN (not on any medication), CKD stage IV (not following with nephrology), hypothyroidism, opioid dependance, recent admission due to unresponsiveness, presents for 3-4 days of left foot coldness, pain and worsening ulcers.  Patient went today to see Dr. Gooden who sent patient to ED. He noted that he has had left foot ulcers for the last 5 years, course was waxing and waning and was stable with dressing. Lately he felt his wound is getting worse and his foot felt cold. Denies any fever, chills. Patient denying any cp or sob. No GI or  complaints.  In ED, patient afebrile, tachycardic hypertensive. He was given 2L of LR, and one dose of vancomycin. Patient was seen by vascular surgery and arterial doppler US of the right LE was done.       PROBLEMS  1. Severe PVD  Hx Right BKA  Pt presented with cold Lt LE and ulcers    New problem with additional W/U    acute illness with systemic symptoms    wbc 14.01  Duplex: 1. Moderate disease in the left SFA.  2. No flow noted in the left posterior tibial artery     Seen by vascular: Left BKA planned    On cefTRIAXone   IVPB 1000 milliGRAM(s) IV Intermittent every 24 hours  doxycycline hyclate Capsule 100 milliGRAM(s) Oral every 12 hours    2. CKD 4  Cr 4.2  eGFR 15    3. Resolved hyponatremia    4. Malnutrition  BMI<19  dysphagia    PLAN  Obtain HbA1C  - Continue Ceftriaxone, doxy for now  Nasal MRSA PCR  - Repeat wbc  Await surgical intervention  Nutrition Consult

## 2019-11-30 NOTE — PROGRESS NOTE ADULT - SUBJECTIVE AND OBJECTIVE BOX
SUBJECTIVE:    Today is hospital day 4d. This morning he is resting comfortably in bed and reports no new issues or overnight events.     PAST MEDICAL & SURGICAL HISTORY  Anemia  PAD (peripheral artery disease)  Hypertension  Suprapubic catheter  Pressure ulcer  Diabetes  Lumbar compression fracture  S/P below knee amputation, right  S/P debridement  Toe amputation status, right  H/O hernia repair    SOCIAL HISTORY:  Negative for smoking/alcohol/drug use.     ALLERGIES:  sulfamethizole (Unknown)    MEDICATIONS:  STANDING MEDICATIONS  acetaminophen   Tablet .. 650 milliGRAM(s) Oral every 6 hours  ascorbic acid 500 milliGRAM(s) Oral daily  calcium acetate 667 milliGRAM(s) Oral three times a day with meals  calcium carbonate    500 mG (Tums) Chewable 1 Tablet(s) Chew every 8 hours  cefTRIAXone   IVPB 1000 milliGRAM(s) IV Intermittent every 24 hours  chlorhexidine 4% Liquid 1 Application(s) Topical <User Schedule>  cholecalciferol 4000 Unit(s) Oral daily  dextrose 5% + sodium chloride 0.45% 1000 milliLiter(s) IV Continuous <Continuous>  dextrose 50% Injectable 12.5 Gram(s) IV Push once  dextrose 50% Injectable 25 Gram(s) IV Push once  dextrose 50% Injectable 25 Gram(s) IV Push once  doxycycline hyclate Capsule 100 milliGRAM(s) Oral every 12 hours  ferrous    sulfate 325 milliGRAM(s) Oral two times a day  heparin  Injectable 5000 Unit(s) SubCutaneous every 12 hours  levothyroxine 50 MICROGram(s) Oral once  magnesium sulfate  IVPB 2 Gram(s) IV Intermittent every 6 hours  methadone    Tablet 80 milliGRAM(s) Oral every 6 hours  oxybutynin 10 milliGRAM(s) Oral two times a day  pantoprazole    Tablet 40 milliGRAM(s) Oral every 12 hours    PRN MEDICATIONS  dextrose 40% Gel 15 Gram(s) Oral once PRN  diazepam    Tablet 5 milliGRAM(s) Oral two times a day PRN  glucagon  Injectable 1 milliGRAM(s) IntraMuscular once PRN  oxyCODONE    IR 20 milliGRAM(s) Oral every 4 hours PRN    VITALS:   T(F): 98.3  HR: 89  BP: 123/65  RR: 18  SpO2: --    LABS:                        7.8    14.01 )-----------( 332      ( 29 Nov 2019 07:32 )             24.6     11-29    137  |  102  |  52<H>  ----------------------------<  62<L>  4.5   |  21  |  4.2<HH>    Ca    7.6<L>      29 Nov 2019 07:32  Phos  6.2     11-29  Mg     1.3     11-29    TPro  4.5<L>  /  Alb  1.6<L>  /  TBili  <0.2  /  DBili  x   /  AST  10  /  ALT  12  /  AlkPhos  146<H>  11-29        ABG - ( 28 Nov 2019 14:45 )  pH, Arterial: 7.34  pH, Blood: x     /  pCO2: 38    /  pO2: 133   / HCO3: 20    / Base Excess: -4.9  /  SaO2: 99                        RADIOLOGY:    PHYSICAL EXAM:  PHYSICAL EXAM:  GENERAL: NAD, lying in bed comfortably  HEAD:  Atraumatic, Normocephalic  EYES: EOMI, PERRLA, conjunctiva and sclera clear  ENT: Moist mucous membranes  NECK: Supple, No JVD  CHEST/LUNG: Clear to auscultation bilaterally; No rales, rhonchi, wheezing, or rubs. Unlabored respirations  HEART: Regular rate and rhythm; No murmurs, rubs, or gallops  ABDOMEN: Bowel sounds present; Soft, Nontender, Nondistended. No hepatomegally  EXTREMITIES:  2+ Peripheral Pulses, brisk capillary refill. No clubbing, cyanosis, or edema  NERVOUS SYSTEM:  Alert & Oriented X3, speech clear. No deficits   MSK: FROM all 4 extremities, full and equal strength  SKIN: No rashes or lesions

## 2019-11-30 NOTE — CHART NOTE - NSCHARTNOTEFT_GEN_A_CORE
Discussed with patient at length--he is agreeable to start hemodialysis. Attempted to reach out to nephrology service, no success. Discussed with patient at length--he is agreeable to start hemodialysis. Talked with Dr. Ahuja over the phone: pt does not have acute need for HD and may not benefit from pre-operative HD. Will plan for scheduled OR Monday for BKA.

## 2019-11-30 NOTE — PROGRESS NOTE ADULT - SUBJECTIVE AND OBJECTIVE BOX
Nephrology progress note    THIS IS AN INCOMPLETE NOTE . FULL NOTE TO FOLLOW SHORTLY    Patient is seen and examined, events over the last 24 h noted .    Allergies:  sulfamethizole (Unknown)    Hospital Medications:   MEDICATIONS  (STANDING):  acetaminophen   Tablet .. 650 milliGRAM(s) Oral every 6 hours  ascorbic acid 500 milliGRAM(s) Oral daily  calcium acetate 667 milliGRAM(s) Oral three times a day with meals  calcium carbonate    500 mG (Tums) Chewable 1 Tablet(s) Chew every 8 hours  cefTRIAXone   IVPB 1000 milliGRAM(s) IV Intermittent every 24 hours  chlorhexidine 4% Liquid 1 Application(s) Topical <User Schedule>  cholecalciferol 4000 Unit(s) Oral daily  dextrose 5% + sodium chloride 0.45% 1000 milliLiter(s) (75 mL/Hr) IV Continuous <Continuous>  dextrose 50% Injectable 12.5 Gram(s) IV Push once  dextrose 50% Injectable 25 Gram(s) IV Push once  dextrose 50% Injectable 25 Gram(s) IV Push once  doxycycline hyclate Capsule 100 milliGRAM(s) Oral every 12 hours  ferrous    sulfate 325 milliGRAM(s) Oral two times a day  folic acid 1 milliGRAM(s) Oral daily  heparin  Injectable 5000 Unit(s) SubCutaneous every 12 hours  levothyroxine 50 MICROGram(s) Oral once  magnesium sulfate  IVPB 2 Gram(s) IV Intermittent every 6 hours  methadone    Tablet 80 milliGRAM(s) Oral every 6 hours  oxybutynin 10 milliGRAM(s) Oral two times a day  pantoprazole    Tablet 40 milliGRAM(s) Oral every 12 hours        VITALS:  T(F): 97.5 (19 @ 12:27), Max: 99.6 (19 @ 13:11)  HR: 78 (19 @ 12:27)  BP: 124/62 (19 @ 12:27)  RR: 16 (19 @ 12:27)  SpO2: --  Wt(kg): --     @ 07:01  -   @ 07:00  --------------------------------------------------------  IN: 360 mL / OUT: 1900 mL / NET: -1540 mL     @ 07:01  -  30 @ 07:00  --------------------------------------------------------  IN: 0 mL / OUT: 1500 mL / NET: -1500 mL          PHYSICAL EXAM:  Constitutional: NAD  HEENT: anicteric sclera, oropharynx clear, MMM  Neck: No JVD  Respiratory: CTAB, no wheezes, rales or rhonchi  Cardiovascular: S1, S2, RRR  Gastrointestinal: BS+, soft, NT/ND  Extremities: No cyanosis or clubbing. No peripheral edema  :  No brown.   Skin: No rashes    LABS:      137  |  102  |  52<H>  ----------------------------<  62<L>  4.5   |  21  |  4.2<HH>    Ca    7.6<L>      2019 07:32  Phos  6.2       Mg     1.3         TPro  4.5<L>  /  Alb  1.6<L>  /  TBili  <0.2  /  DBili      /  AST  10  /  ALT  12  /  AlkPhos  146<H>                            7.8    14.01 )-----------( 332      ( 2019 07:32 )             24.6       Urine Studies:  Urinalysis Basic - ( 2019 11:58 )    Color: Light Yellow / Appearance: Slightly Turbid / S.007 / pH:   Gluc:  / Ketone: Negative  / Bili: Negative / Urobili: <2 mg/dL   Blood:  / Protein: 300 mg/dL / Nitrite: Positive   Leuk Esterase: Large / RBC:  / WBC    Sq Epi:  / Non Sq Epi:  / Bacteria:       Sodium, Random Urine: 31.0 mmoL/L ( @ 11:58)  Creatinine, Random Urine: 17 mg/dL ( @ 11:58)  Potassium, Random Urine: 9 mmol/L ( @ 11:58)    RADIOLOGY & ADDITIONAL STUDIES: Nephrology progress note    Patient is seen and examined, events over the last 24 h noted .  lying in bed comfortable     Allergies:  sulfamethizole (Unknown)    Hospital Medications:   MEDICATIONS  (STANDING):  acetaminophen   Tablet .. 650 milliGRAM(s) Oral every 6 hours  ascorbic acid 500 milliGRAM(s) Oral daily  calcium acetate 667 milliGRAM(s) Oral three times a day with meals  calcium carbonate    500 mG (Tums) Chewable 1 Tablet(s) Chew every 8 hours  cefTRIAXone   IVPB 1000 milliGRAM(s) IV Intermittent every 24 hours  chlorhexidine 4% Liquid 1 Application(s) Topical <User Schedule>  cholecalciferol 4000 Unit(s) Oral daily  dextrose 5% + sodium chloride 0.45% 1000 milliLiter(s) (75 mL/Hr) IV Continuous <Continuous>  doxycycline hyclate Capsule 100 milliGRAM(s) Oral every 12 hours  ferrous    sulfate 325 milliGRAM(s) Oral two times a day  folic acid 1 milliGRAM(s) Oral daily  heparin  Injectable 5000 Unit(s) SubCutaneous every 12 hours  levothyroxine 50 MICROGram(s) Oral once  magnesium sulfate  IVPB 2 Gram(s) IV Intermittent every 6 hours  methadone    Tablet 80 milliGRAM(s) Oral every 6 hours  oxybutynin 10 milliGRAM(s) Oral two times a day  pantoprazole    Tablet 40 milliGRAM(s) Oral every 12 hours        VITALS:  T(F): 97.5 (19 @ 12:27), Max: 99.6 (19 @ 13:11)  HR: 78 (19 @ 12:27)  BP: 124/62 (19 @ 12:27)  RR: 16 (19 @ 12:27)  SpO2: --  Wt(kg): --     @ 07:  -   @ 07:00  --------------------------------------------------------  IN: 360 mL / OUT: 1900 mL / NET: -1540 mL     @ 07:01  -   @ 07:00  --------------------------------------------------------  IN: 0 mL / OUT: 1500 mL / NET: -1500 mL          PHYSICAL EXAM:  Constitutional: NAD  HEENT: anicteric sclera, oropharynx clear, MMM  Neck: No JVD  Respiratory: CTAB, no wheezes, rales or rhonchi  Cardiovascular: S1, S2, RRR  Gastrointestinal: BS+, soft, NT/ND  Extremities: No cyanosis or clubbing. No peripheral edema/ right bKA   :  No brown.   Skin: No rashes    LABS:      135  |  98  |  50<H>  ----------------------------<  97  4.1   |  22  |  4.1<HH>    Ca    7.8<L>      2019 11:40  Phos  5.3       Mg     1.9         TPro  4.5<L>  /  Alb  1.6<L>  /  TBili  <0.2  /  DBili  x   /  AST  12  /  ALT  11  /  AlkPhos  155<H>      137  |  102  |  52<H>  ----------------------------<  62<L>  4.5   |  21  |  4.2<HH>    Ca    7.6<L>      2019 07:32  Phos  6.2       Mg     1.3         TPro  4.5<L>  /  Alb  1.6<L>  /  TBili  <0.2  /  DBili      /  AST  10  /  ALT  12  /  AlkPhos  146<H>                            7.8    14.01 )-----------( 332      ( 2019 07:32 )             24.6       Urine Studies:  Urinalysis Basic - ( 2019 11:58 )    Color: Light Yellow / Appearance: Slightly Turbid / S.007 / pH:   Gluc:  / Ketone: Negative  / Bili: Negative / Urobili: <2 mg/dL   Blood:  / Protein: 300 mg/dL / Nitrite: Positive   Leuk Esterase: Large / RBC:  / WBC    Sq Epi:  / Non Sq Epi:  / Bacteria:       Sodium, Random Urine: 31.0 mmoL/L ( @ 11:58)  Creatinine, Random Urine: 17 mg/dL ( @ 11:58)  Potassium, Random Urine: 9 mmol/L ( @ 11:58)    RADIOLOGY & ADDITIONAL STUDIES:

## 2019-12-01 NOTE — PROGRESS NOTE ADULT - ASSESSMENT
Pt with PVD and BKA, failed revascularization   SILVINA creatinine 4.2 not improving, no need for RRT at this time discuss indication and details with patients

## 2019-12-01 NOTE — PROGRESS NOTE ADULT - SUBJECTIVE AND OBJECTIVE BOX
MARGE BELLA  [facility] T2-3A 024 A ([facility] T2-3A)            Patient was evaluated and examined  by bedside, reports occasional dysphagia to solid food, poor appetite, very selective with diet. no fever. left leg pain controlled with current pain regimen.        REVIEW OF SYSTEMS:  please see pertinent positives mentioned above, all other 12 ROS negative      T(C): , Max: 36.8 (12-01-19 @ 05:13)  HR: 73 (12-01-19 @ 05:13)  BP: 115/67 (12-01-19 @ 05:13)  RR: 18 (12-01-19 @ 05:13)  SpO2: --  CAPILLARY BLOOD GLUCOSE      POCT Blood Glucose.: 110 mg/dL (01 Dec 2019 12:11)  POCT Blood Glucose.: 237 mg/dL (01 Dec 2019 09:30)  POCT Blood Glucose.: 73 mg/dL (01 Dec 2019 07:44)  POCT Blood Glucose.: 165 mg/dL (01 Dec 2019 02:10)  POCT Blood Glucose.: 95 mg/dL (30 Nov 2019 20:56)  POCT Blood Glucose.: 75 mg/dL (30 Nov 2019 17:01)      PHYSICAL EXAM:  GENERAL: NAD, AAOX3, patient is laying comfortably in bed  HEENT: AT, NC, PERRLA, SUPPLE, NO JVD, NO CB  LUNG: CTA B/L  CVS: normal S1, S2, RRR, NO M/G/R  ABDOMEN: soft, bowel sounds present, normoactive in all 4 quadrants, non-tender, non-distended  EXT: right BKA status, no E/C/C   NEURO: no acute focal neurological deficits  SKIN: left foot dry gangrene with foul odor,   multiple tattoes present.        LAB  CBC  Date: 12-01-19 @ 07:09  Mean cell Uaqfpwnzsn85.0  Mean cell Hemoglobin Conc32.1  Mean cell Volum 87.5  Platelet count-Automate 317  RBC Count 2.71  Red Cell Distrib Width15.6  WBC Count8.69  % Albumin, Urine--  Hematocrit 23.7  Hemoglobin 7.6  CBC  Date: 11-30-19 @ 11:40  Mean cell Bppwzafymv68.7  Mean cell Hemoglobin Conc31.7  Mean cell Volum 87.6  Platelet count-Automate 326  RBC Count 2.74  Red Cell Distrib Width15.6  WBC Count8.85  % Albumin, Urine--  Hematocrit 24.0  Hemoglobin 7.6  CBC  Date: 11-29-19 @ 07:32  Mean cell Nmanklwbvd28.9  Mean cell Hemoglobin Conc31.7  Mean cell Volum 87.9  Platelet count-Automate 332  RBC Count 2.80  Red Cell Distrib Width16.0  WBC Count14.01  % Albumin, Urine--  Hematocrit 24.6  Hemoglobin 7.8    Date: 11-27-19 @ 06:17  Mean cell Fvrdyezpye45.6  Mean cell Hemoglobin Conc31.2  Mean cell Volum 88.6  Platelet count-Automate 356  RBC Count 2.46  Red Cell Distrib Width16.1  WBC Count9.30  % Albumin, Urine--  Hematocrit 21.8  Hemoglobin 6.8  CBC  Date: 11-27-19 @ 00:30  Mean cell Wfigcpugrg69.2  Mean cell Hemoglobin Conc30.3  Mean cell Volum 89.9  Platelet count-Automate 363  RBC Count 2.57  Red Cell Distrib Width16.1  WBC Count10.52  % Albumin, Urine--  Hematocrit 23.1  Hemoglobin 7.0  CBC  Date: 11-26-19 @ 18:30  Mean cell Grybtegtvl19.8  Mean cell Hemoglobin Conc30.6  Mean cell Volum 91.0  Platelet count-Automate 389  RBC Count 2.77  Red Cell Distrib Width16.3  WBC Count11.53  % Albumin, Urine--  Hematocrit 25.2  Hemoglobin 7.7    BMP  12-01-19 @ 07:09  Blood Gas Arterial-Calcium,Ionized--  Blood Urea Nitrogen, Serum 45 mg/dL<H> [10 - 20]  Carbon Dioxide, Serum25 mmol/L [17 - 32]  Chloride, Serum97 mmol/L<L> [98 - 110]  Creatinie, Serum4.2 mg/dL<HH> [0.7 - 1.5] [Critical value:]  Glucose, Serum76 mg/dL [70 - 99]  Potassium, Serum3.7 mmol/L [3.5 - 5.0]  Sodium, Serum 134 mmol/L<L> [135 - 146]  BMP  11-30-19 @ 11:40  Blood Gas Arterial-Calcium,Ionized--  Blood Urea Nitrogen, Serum 50 mg/dL<H> [10 - 20]  Carbon Dioxide, Serum22 mmol/L [17 - 32]  Chloride, Serum98 mmol/L [98 - 110]  Creatinie, Serum4.1 mg/dL<HH> [0.7 - 1.5] [Critical value:]  Glucose, Serum97 mg/dL [70 - 99]  Potassium, Serum4.1 mmol/L [3.5 - 5.0]  Sodium, Serum 135 mmol/L [135 - 146]  [facility]  11-29-19 @ 07:32  Blood Gas Arterial-Calcium,Ionized--  Blood Urea Nitrogen, Serum 52 mg/dL<H> [10 - 20]  Carbon Dioxide, Serum21 mmol/L [17 - 32]  Chloride, Kvgpw375 mmol/L [98 - 110]  Creatinie, Serum4.2 mg/dL<HH> [0.7 - 1.5] [Critical value:]  Glucose, Serum62 mg/dL<L> [70 - 99]  Potassium, Serum4.5 mmol/L [3.5 - 5.0]  Sodium, Serum 137 mmol/L [135 - 146]    [facility]  11-28-19 @ 06:29  Blood Gas Arterial-Calcium,Ionized--  Blood Urea Nitrogen, Serum 58 mg/dL<H> [10 - 20]  Carbon Dioxide, Serum16 mmol/L<L> [17 - 32]  Chloride, Xutwo087 mmol/L [98 - 110]  Creatinie, Serum4.7 mg/dL<HH> [0.7 - 1.5] [Critical value:]  Glucose, Serum23 mg/dL<LL> [70 - 99] [TYPE: HYPERCRITICAL RESULT  TESTS: Glu  DATE/TIME CALLED: 11/28/19 07:38  CALLED TO: Dr. Bess  READ BACK (2 Patient Identifiers)(Y/N): y  READ BACK VALUES (Y/N): y  LICENSED STAFF AVAILABLE FOR IMMEDIATE TREATMENT (Y/N): y  RAPID RESPONSE TEAM NOTIFIED (Y/N): n  RRT CALLED TO:n/a  CALLED BY: maggy]  Potassium, Serum4.3 mmol/L [3.5 - 5.0]  Sodium, Serum 134 mmol/L<L> [135 - 146]      11-27-19 @ 00:30  Blood Gas Arterial-Calcium,Ionized--  Blood Urea Nitrogen, Serum 65 mg/dL<HH> [10 - 20] [Critical value:]  Carbon Dioxide, Serum13 mmol/L<L> [17 - 32]  Chloride, Mypxq823 mmol/L [98 - 110]  Creatinie, Serum4.6 mg/dL<HH> [0.7 - 1.5] [Critical value:]  Glucose, Serum98 mg/dL [70 - 99]  Potassium, Serum5.0 mmol/L [3.5 - 5.0]  Sodium, Serum 133 mmol/L<L> [135 - 146]  BMP  11-26-19 @ 18:30  Blood Gas Arterial-Calcium,Ionized--  Blood Urea Nitrogen, Serum 71 mg/dL<HH> [10 - 20] [Critical value:  TYPE:(C=Critical, N=Notification, A=Abnormal) C  TESTS: BUN,CREAT .  DATE/TIME CALLED: 11/26/19 19:06  CALLED TO:   READ BACK (2 Patient Identifiers)(Y/N): Y  READ BACK VALUES (Y/N): Y  CALLED BY: SP]  Carbon Dioxide, Serum13 mmol/L<L> [17 - 32]  Chloride, Aoaqm628 mmol/L [98 - 110]  Creatinie, Serum4.8 mg/dL<HH> [0.7 - 1.5] [Critical value:  TYPE:(C=Critical, N=Notification, A=Abnormal) C  TESTS: BUN,CREAT .  DATE/TIME CALLED: 11/26/19 19:06  CALLED TO:   READ BACK (2 Patient Identifiers)(Y/N): Y  READ BACK VALUES (Y/N): Y  CALLED BY: SP]  Glucose, Serum60 mg/dL<L> [70 - 99]  Potassium, Serum5.3 mmol/L<H> [3.5 - 5.0]  Sodium, Serum 132 mmol/L<L> [135 - 146]              Microbiology:    Culture - Blood (collected 11-27-19 @ 00:30)  Source: .Blood Blood-Peripheral  Preliminary Report (11-28-19 @ 07:01):    No growth to date.      Medications:  acetaminophen   Tablet .. 650 milliGRAM(s) Oral every 6 hours PRN  ascorbic acid 500 milliGRAM(s) Oral daily  calcium acetate 667 milliGRAM(s) Oral three times a day with meals  calcium carbonate    500 mG (Tums) Chewable 1 Tablet(s) Chew every 8 hours  cefTRIAXone   IVPB 1000 milliGRAM(s) IV Intermittent every 24 hours  chlorhexidine 4% Liquid 1 Application(s) Topical <User Schedule>  cholecalciferol 4000 Unit(s) Oral daily  dextrose 40% Gel 15 Gram(s) Oral once PRN  dextrose 5% 1000 milliLiter(s) IV Continuous <Continuous>  dextrose 50% Injectable 12.5 Gram(s) IV Push once  dextrose 50% Injectable 25 Gram(s) IV Push once  dextrose 50% Injectable 25 Gram(s) IV Push once  diazepam    Tablet 5 milliGRAM(s) Oral two times a day PRN  doxycycline hyclate Capsule 100 milliGRAM(s) Oral every 12 hours  ferrous    sulfate 325 milliGRAM(s) Oral two times a day  folic acid 1 milliGRAM(s) Oral daily  glucagon  Injectable 1 milliGRAM(s) IntraMuscular once PRN  heparin  Injectable 5000 Unit(s) SubCutaneous every 12 hours  levothyroxine 100 MICROGram(s) Oral daily  methadone    Tablet 80 milliGRAM(s) Oral every 6 hours  oxybutynin 10 milliGRAM(s) Oral two times a day  oxyCODONE    IR 20 milliGRAM(s) Oral every 8 hours PRN  pantoprazole    Tablet 40 milliGRAM(s) Oral every 12 hours        Assessment and Plan:  Mr. Bella is a 60 yo male patient with PMHx of PVD s/p Right AKA, paraplegia with multiple chronic bedsores and chronic  suprapubic catheter, HTN (not on any medication), CKD stage IV (not following with nephrology), hypothyroidism, opioid dependance, recent admission due to unresponsiveness, presents for 3-4 days of left foot coldness, pain and worsening ulcers.    # Chronic left foot peripheral artery disease + dry gangrene  - Patient with chronic wound of left foot with gangrenous changes.   - Doppler US showed Moderate disease in the left SFA, No flow noted in the left posterior tibial artery   - Patient was not on any antiplatelets or statin.   - Evaluated by vascular surgery, now planning for possible left BKA tomorrow.  - will keep on maintenance fluids  - Will continue ceftriaxone + doxycycline- as per  ID  recommendations  -NPO post midnight all preop labs.    # hypoglycemia-continue D5with bicarb. infusion., monitor bsfs.      # Poor PO intake, difficulty swallowing  - Patient with occasional difficulty swallowing at home  - dysphagia diet - with speech tx. f/up- obtain barium esophagogram    - Hypotension- resolved post decrease of opioids dose tx.      Anemia due to Iron deficiency and CKD  - s/p 1 unit pRBC 11/27/19    -h/h remains low stable, started on PO iron tx.  -obtain folate- borderline low and vitamin B 12 level- normal  -added on daily folate tx.    # SILVINA on CKD stage 4 + HAGMA  - Patient with serum creatinine 4.2 up from 3.7  - Patient agreed to have temporary dialysis during inpatient stay only, or following with nephrologist.   -Nephrology on Board.  - on sodium bicarb- 2 amps  in D5W at 75 ml/hr-with BMP f/up in am  - started phoslo,   - Consider LINDY and Epogen  -f/up daily magnesium and phos levels  -avoid nephrotoxic agents.      #h/o  HTN- now normotensive  -continue to monitor VS    # Opioid dependance  - Patient on methadone 80 mg QID + Oxycontin 60 mg QID ( oversedated- ? how pt. can be on both)- obtained pain management consult- recommended to decrease Oxycodone dose to 20 mg  every 4 hours as needed for breakthrough pain- I believe this dose is  still too high ,  decreases the frequency to every 8 hours as needed for breakthrough pain to avoid oversedation.  -pain management placed standing tylenol order- which was d/c to prevent tylenol toxicity. will only presribe tylenol on prn basis.      # Chronic sacral bedsore.  - Followed by Dr. Gooden    # Hypothyroidism- severe uncontrolled  - patient was on levothyroxine 50 mcg daily, increased to 100 MCG po once daily , will need to repeat thyroid profile in 2 weeks.  -obtain PTO abx, thyroglobulin level, US thyroid.      # Severe Vitamin D deficiency- on vitamin D tx.    daily DVT proph.    #Progress Note Handoff: monitor bsfs,  f/up vascular preop for left BKA tomorrow, NPO post midnight,  monitor BMP for metabolic acidosis and SILVINA. f/up thyroid US, thyroid ab's  Family discussion: medical plan of tx. d/w pt. Disposition: to be determined .

## 2019-12-01 NOTE — CHART NOTE - NSCHARTNOTEFT_GEN_A_CORE
Patient to OR tomorrow with Vascular Surgery  Procedure: Left below knee amputation     Please pre-op for 12/2    NPO @ MN  active type and screen, coags, cbc, bmp, mag, phos

## 2019-12-01 NOTE — PROGRESS NOTE ADULT - SUBJECTIVE AND OBJECTIVE BOX
AJAY FOLLOW UP NOTE  --------------------------------------------------------------------------------  Chief Complaint:    24 hour events/subjective:        PAST HISTORY  --------------------------------------------------------------------------------  No significant changes to PMH, PSH, FHx, SHx, unless otherwise noted    ALLERGIES & MEDICATIONS  --------------------------------------------------------------------------------  Allergies    sulfamethizole (Unknown)    Intolerances      Standing Inpatient Medications  acetaminophen   Tablet .. 650 milliGRAM(s) Oral every 6 hours  ascorbic acid 500 milliGRAM(s) Oral daily  calcium acetate 667 milliGRAM(s) Oral three times a day with meals  calcium carbonate    500 mG (Tums) Chewable 1 Tablet(s) Chew every 8 hours  cefTRIAXone   IVPB 1000 milliGRAM(s) IV Intermittent every 24 hours  chlorhexidine 4% Liquid 1 Application(s) Topical <User Schedule>  cholecalciferol 4000 Unit(s) Oral daily  dextrose 5% 1000 milliLiter(s) IV Continuous <Continuous>  dextrose 50% Injectable 12.5 Gram(s) IV Push once  dextrose 50% Injectable 25 Gram(s) IV Push once  dextrose 50% Injectable 25 Gram(s) IV Push once  doxycycline hyclate Capsule 100 milliGRAM(s) Oral every 12 hours  ferrous    sulfate 325 milliGRAM(s) Oral two times a day  folic acid 1 milliGRAM(s) Oral daily  heparin  Injectable 5000 Unit(s) SubCutaneous every 12 hours  levothyroxine 100 MICROGram(s) Oral daily  magnesium sulfate  IVPB 2 Gram(s) IV Intermittent every 6 hours  methadone    Tablet 80 milliGRAM(s) Oral every 6 hours  oxybutynin 10 milliGRAM(s) Oral two times a day  pantoprazole    Tablet 40 milliGRAM(s) Oral every 12 hours    PRN Inpatient Medications  dextrose 40% Gel 15 Gram(s) Oral once PRN  diazepam    Tablet 5 milliGRAM(s) Oral two times a day PRN  glucagon  Injectable 1 milliGRAM(s) IntraMuscular once PRN  oxyCODONE    IR 20 milliGRAM(s) Oral every 8 hours PRN      REVIEW OF SYSTEMS  --------------------------------------------------------------------------------  Gen: No weight changes, fatigue, fevers/chills, weakness  Skin: No rashes  Head/Eyes/Ears/Mouth: No headache; Normal hearing; Normal vision w/o blurriness; No sinus pain/discomfort, sore throat  Respiratory: No dyspnea, cough, wheezing, hemoptysis  CV: No chest pain, PND, orthopnea  GI: No abdominal pain, diarrhea, constipation, nausea, vomiting, melena, hematochezia  : No increased frequency, dysuria, hematuria, nocturia  MSK: No joint pain/swelling; no back pain; no edema  Neuro: No dizziness/lightheadedness, weakness, seizures, numbness, tingling  Heme: No easy bruising or bleeding  Endo: No heat/cold intolerance  Psych: No significant nervousness, anxiety, stress, depression    All other systems were reviewed and are negative, except as noted.    VITALS/PHYSICAL EXAM  --------------------------------------------------------------------------------  T(C): 36.8 (12-01-19 @ 05:13), Max: 36.8 (12-01-19 @ 05:13)  HR: 73 (12-01-19 @ 05:13) (73 - 81)  BP: 115/67 (12-01-19 @ 05:13) (115/67 - 124/62)  RR: 18 (12-01-19 @ 05:13) (16 - 18)  SpO2: --  Wt(kg): --        11-30-19 @ 07:01  -  12-01-19 @ 07:00  --------------------------------------------------------  IN: 0 mL / OUT: 2900 mL / NET: -2900 mL      Physical Exam:  	Gen: NAD, well-appearing  	HEENT: PERRL, supple neck, clear oropharynx  	Pulm: CTA B/L  	CV: RRR, S1S2; no rub  	Back: No spinal or CVA tenderness; no sacral edema  	Abd: +BS, soft, nontender/nondistended  	: No suprapubic tenderness  	UE: Warm, FROM, no clubbing, intact strength; no edema; no asterixis  	LE: Warm, FROM, no clubbing, intact strength; no edema  	Neuro: No focal deficits, intact gait  	Psych: Normal affect and mood  	Skin: Warm, without rashes  	Vascular access:    LABS/STUDIES  --------------------------------------------------------------------------------              7.6    8.69  >-----------<  317      [12-01-19 @ 07:09]              23.7     134  |  97  |  45  ----------------------------<  76      [12-01-19 @ 07:09]  3.7   |  25  |  4.2        Ca     7.6     [12-01-19 @ 07:09]      Mg     1.9     [11-30-19 @ 11:40]      Phos  5.6     [12-01-19 @ 07:09]    TPro  4.4  /  Alb  1.4  /  TBili  <0.2  /  DBili  x   /  AST  11  /  ALT  11  /  AlkPhos  156  [12-01-19 @ 07:09]          Creatinine Trend:  SCr 4.2 [12-01 @ 07:09]  SCr 4.1 [11-30 @ 11:40]  SCr 4.2 [11-29 @ 07:32]  SCr 4.7 [11-28 @ 06:29]  SCr 4.6 [11-28 @ 01:25]    Urinalysis - [11-27-19 @ 11:58]      Color Light Yellow / Appearance Slightly Turbid / SG 1.007 / pH 6.5      Gluc Trace / Ketone Negative  / Bili Negative / Urobili <2 mg/dL       Blood Trace / Protein 300 mg/dL / Leuk Est Large / Nitrite Positive      RBC 1 / WBC 59 / Hyaline 4 / Gran  / Sq Epi  / Non Sq Epi 1 / Bacteria Many    Urine Creatinine 17      [11-27-19 @ 11:58]  Urine Sodium 31.0      [11-27-19 @ 11:58]  Urine Urea Nitrogen 152      [11-27-19 @ 11:58]  Urine Potassium 9      [11-27-19 @ 11:58]    Iron 27, TIBC 89, %sat 30      [11-27-19 @ 06:17]  Ferritin 679      [11-27-19 @ 06:17]  PTH -- (Ca 7.8)      [11-28-19 @ 06:29]   99  PTH -- (Ca 7.0)      [10-14-19 @ 19:45]   209  PTH -- (Ca 7.8)      [07-02-19 @ 10:54]   71  Vitamin D (25OH) <5      [10-14-19 @ 19:45]  HbA1c 5.5      [06-27-19 @ 06:35]  TSH 72.80      [11-29-19 @ 07:32]

## 2019-12-02 NOTE — PROGRESS NOTE ADULT - ASSESSMENT
Mr. Booker is a 58 yo male patient with Pmhx of PVD s/p R AKA, paraplegia with multiple chronic bedsores and suprapubic catheter, HTN (not on any medication), CKD stage IV (not following with nephrology), hypothyroidism, opioid dependance, recent admission due to unresponsiveness, presents for 3-4 days of left foot coldness, pain and worsening ulcers.  Patient went today to see Dr. Gooden who sent patient to ED. He noted that he has had left foot ulcers for the last 5 years, course was waxing and waning and was stable with dressing. Lately he felt his wound is getting worse and his foot felt cold. Denies any fever, chills. Patient denying any cp or sob. No GI or  complaints.  In ED, patient afebrile, tachycardic hypertensive. He was given 2L of LR, and one dose of vancomycin. Patient was seen by vascular surgery and arterial doppler US of the right LE was done.     IMPRESSION:  # Severe PAD with ischemic LLE  -s/p Right BKA  -WBC 9.1  Duplex: 1. Moderate disease in the left SFA.  2. No flow noted in the left posterior tibial artery   # Chronic sacral OM with cellulitis    RECOMMENDATIONS:  Obtain HbA1C  Vancomycin 500 mg iv q24h  Check trough before the 2nd dose  Rocephin 2 gm iv q24h  Flagyl 500 mg iv q8h  Off loading  F/u with vascular for possible L BKA

## 2019-12-02 NOTE — CHART NOTE - NSCHARTNOTEFT_GEN_A_CORE
Patient: MARGE BELLA  MRN: [id number]  59y Male  Location: [facility] T2-3A 024 A  19 @ 00:39    Vitals:  T(F): 98.2 (19 @ 20:28), Max: 98.2 (19 @ 05:13)  HR: 74 (19 @ 20:28) (69 - 74)  BP: 101/68 (19 @ 20:28) (101/68 - 115/67)  RR: 18 (19 @ 20:28) (18 - 18)  SpO2: --    Procedure: Left below knee amputation  Consent in Chart: [x  ] Yes [  ] No  Diet: Please avoid eating any foods that are high in fat/grease, or foods that make you gassy or cause you to experience GI upset, including spicy foods for 2-3 weeks after your procedure.  Fluids: ascorbic acid 500 milliGRAM(s) Oral daily  calcium acetate 667 milliGRAM(s) Oral three times a day with meals  cholecalciferol 4000 Unit(s) Oral daily  dextrose 5% 1000 milliLiter(s) (75 mL/Hr) IV Continuous <Continuous>  ferrous    sulfate 325 milliGRAM(s) Oral two times a day  folic acid 1 milliGRAM(s) Oral daily    EK Lead ECG:   Ventricular Rate 81 BPM    Atrial Rate 81 BPM    P-R Interval 172 ms    QRS Duration 94 ms    Q-T Interval 398 ms    QTC Calculation(Bezet) 462 ms    P Axis 69 degrees    R Axis 15 degrees    T Axis 13 degrees    Diagnosis Line Normal sinus rhythm  Normal ECG    Confirmed by Sesar Lowe ([id number]) on 2019 6:26:25 AM (19 @ 20:36)    CXR:  Xray Chest 1 View-PORTABLE IMMEDIATE:   EXAM:  XR CHEST PORTABLE IMMED 1V            PROCEDURE DATE:  2019            INTERPRETATION:  Clinical History / Reason for exam: Shortness of breath    Comparison : Chest radiograph 10/14/2019.    Technique/Positioning: Single frontal chestradiograph.    Findings:    Support devices: None.    Cardiac/mediastinum/hilum: Normal size heart. Uncoiled aorta.    Lung parenchyma/Pleura: No focal pulmonary consolidation, pleural   effusion, or pneumothorax.    Skeleton/soft tissues: Post spinal fixation.    Impression:      No radiographic evidence of acute cardiopulmonary disease.                      SIERRA LOPEZ M.D., ATTENDING RADIOLOGIST  This document has been electronically signed. 2019  9:09AM             (19 @ 18:32)    Pre-OP Labs:  CAPILLARY BLOOD GLUCOSE      POCT Blood Glucose.: 146 mg/dL (01 Dec 2019 22:04)  POCT Blood Glucose.: 92 mg/dL (01 Dec 2019 16:59)  POCT Blood Glucose.: 110 mg/dL (01 Dec 2019 12:11)  POCT Blood Glucose.: 237 mg/dL (01 Dec 2019 09:30)  POCT Blood Glucose.: 73 mg/dL (01 Dec 2019 07:44)  POCT Blood Glucose.: 165 mg/dL (01 Dec 2019 02:10)                          7.6    8.69  )-----------( 317      ( 01 Dec 2019 07:09 )             23.7     12-    134<L>  |  97<L>  |  45<H>  ----------------------------<  76  3.7   |  25  |  4.2<HH>    Ca    7.6<L>      01 Dec 2019 07:09  Phos  5.6     12-  Mg     1.9     -30    TPro  4.4<L>  /  Alb  1.4<L>  /  TBili  <0.2  /  DBili  x   /  AST  11  /  ALT  11  /  AlkPhos  156<H>  12-        Type & Screen: 19    Anticoagulation/Antiplatelet: hsq

## 2019-12-02 NOTE — PROGRESS NOTE ADULT - ASSESSMENT
59/M with SILVINA on CKD 4 (baseline creat 3.8 mg% Oct 2019) SPC, severe PVD, presented with cold Lt LE and ulcers, found to have severe anemia Hb 6.8 and acidosis.    SILVINA on CKD 4  prerenal vs ATN (ischemic) severe hypotension  - non oliguric/ creat stable for now   - strict Is and OS  HAG MEt acidosis -cont current IVF / bicarb betetr   High phos - cont phoslo, repeat phos, check iPTH  Dysphagia - speech and swallow  Anemia Tsat 30, chek ferritin, blood Tx, will need Procrit later after resolution of left lower ext issues   Pt never wanted HD, monitor closely volume status, stabilize BP  - high risk for GOLDIE and high risk for worsening renal function psot op   reiterated to pt he is reluctant to be on HD   will follow

## 2019-12-02 NOTE — PROGRESS NOTE ADULT - SUBJECTIVE AND OBJECTIVE BOX
Nephrology progress note    Patient was seen and examined, events over the last 24 h noted .    Allergies:  sulfamethizole (Unknown)    Hospital Medications:   MEDICATIONS  (STANDING):  ascorbic acid 500 milliGRAM(s) Oral daily  calcium acetate 667 milliGRAM(s) Oral three times a day with meals  calcium carbonate    500 mG (Tums) Chewable 1 Tablet(s) Chew every 8 hours  cefTRIAXone   IVPB 1000 milliGRAM(s) IV Intermittent every 24 hours  chlorhexidine 4% Liquid 1 Application(s) Topical <User Schedule>  cholecalciferol 4000 Unit(s) Oral daily  dextrose 5% 1000 milliLiter(s) (75 mL/Hr) IV Continuous <Continuous>  dextrose 50% Injectable 12.5 Gram(s) IV Push once  dextrose 50% Injectable 25 Gram(s) IV Push once  dextrose 50% Injectable 25 Gram(s) IV Push once  doxycycline hyclate Capsule 100 milliGRAM(s) Oral every 12 hours  ferrous    sulfate 325 milliGRAM(s) Oral two times a day  folic acid 1 milliGRAM(s) Oral daily  heparin  Injectable 5000 Unit(s) SubCutaneous every 12 hours  levothyroxine 100 MICROGram(s) Oral daily  methadone    Tablet 80 milliGRAM(s) Oral every 6 hours  oxybutynin 10 milliGRAM(s) Oral two times a day  pantoprazole    Tablet 40 milliGRAM(s) Oral every 12 hours        VITALS:  T(F): 98.3 (19 @ 11:23), Max: 98.3 (19 @ 01:02)  HR: 71 (19 @ 12:13)  BP: 117/59 (19 @ 12:13)  RR: 18 (19 @ 12:13)  SpO2: 98% (19 @ 12:13)  Wt(kg): --     @ :  -   @ 07:00  --------------------------------------------------------  IN: 0 mL / OUT: 2900 mL / NET: -2900 mL     @ 07:01  -   @ 07:00  --------------------------------------------------------  IN: 0 mL / OUT: 1200 mL / NET: -1200 mL     @ 07:01  -   @ 12:26  --------------------------------------------------------  IN: 0 mL / OUT: 1400 mL / NET: -1400 mL      Height (cm): 185.42 ( @ :)  Weight (kg): 63.4 ( @ :)  BMI (kg/m2): 18.4 ( @ :13)  BSA (m2): 1.85 (:13)    PHYSICAL EXAM:  Constitutional: NAD  HEENT: anicteric sclera, oropharynx clear, MMM  Neck: No JVD  Respiratory: CTAB, no wheezes, rales or rhonchi  Cardiovascular: S1, S2, RRR  Gastrointestinal: BS+, soft, NT/ND  Extremities: No cyanosis or clubbing. No peripheral edema  :  No brown.   Skin: No rashes    LABS:      133<L>  |  95<L>  |  43<H>  ----------------------------<  59<L>  4.0   |  24  |  4.0<H>    Ca    7.6<L>      02 Dec 2019 06:45  Phos  4.8         TPro  4.6<L>  /  Alb  1.5<L>  /  TBili  <0.2  /  DBili      /  AST  12  /  ALT  11  /  AlkPhos  160<H>                            7.5    9.14  )-----------( 307      ( 02 Dec 2019 06:45 )             24.1       Urine Studies:  Urinalysis Basic - ( 2019 11:58 )    Color: Light Yellow / Appearance: Slightly Turbid / S.007 / pH:   Gluc:  / Ketone: Negative  / Bili: Negative / Urobili: <2 mg/dL   Blood:  / Protein: 300 mg/dL / Nitrite: Positive   Leuk Esterase: Large / RBC: 1 /HPF / WBC 59 /HPF   Sq Epi:  / Non Sq Epi: 1 /HPF / Bacteria: Many      Sodium, Random Urine: 31.0 mmoL/L ( @ 11:58)  Creatinine, Random Urine: 17 mg/dL ( @ 11:58)  Potassium, Random Urine: 9 mmol/L ( @ 11:58)    RADIOLOGY & ADDITIONAL STUDIES:

## 2019-12-02 NOTE — PROGRESS NOTE ADULT - ATTENDING COMMENTS
I saw and evaluated patient  by bedside, npo for left BKA today.  All labs, radiology studies, VS was reviewed  I have reviewed the resident's note and agree with documented findings and  plan of care.  a/p  Mr. Booker is a 58 yo male patient with PMHx of PVD s/p Right AKA, paraplegia with multiple chronic bedsores and chronic  suprapubic catheter, HTN (not on any medication), CKD stage IV (not following with nephrology), hypothyroidism, opioid dependance, recent admission due to unresponsiveness, presents for 3-4 days of left foot coldness, pain and worsening ulcers.    # Chronic left foot peripheral artery disease + dry gangrene  - Patient with chronic wound of left foot with gangrenous changes.   - Doppler US showed Moderate disease in the left SFA, No flow noted in the left posterior tibial artery   - Patient was not on any antiplatelets or statin.   - Evaluated by vascular surgery, now planning for  left BKA today.  - will keep on maintenance fluids  - Will continue ceftriaxone + doxycycline- as per  ID  recommendations      # hypoglycemia-continue D5with bicarb. infusion., monitor bsfs- perioperatively with hypoglycemia protocol      # Poor PO intake, difficulty swallowing  - Patient with occasional difficulty swallowing at home  - dysphagia diet - with speech tx. f/up- obtain barium esophagogram    - Hypotension- resolved post decrease of opioids dose tx.      Anemia due to Iron deficiency and CKD  - s/p 1 unit pRBC 11/27/19    -h/h remains low stable, started on PO iron tx.  - folate- borderline low and vitamin B 12 level- normal  -added on daily folate tx.    # SILVINA on CKD stage 4 + HAGMA  - Patient with serum creatinine 4 up from 3.7  - Patient agreed to have temporary dialysis during inpatient stay only, or following with nephrologist.   -Nephrology on Board.  - on sodium bicarb- 1 amp in D5W at 75 ml/hr-with BMP f/up in am  - started phoslo,   - Consider LINDY and Epogen  -f/up daily magnesium and phos levels  -avoid nephrotoxic agents.      #h/o  HTN- now normotensive  -continue to monitor VS    # Opioid dependance  - Patient on methadone 80 mg QID + Oxycontin 60 mg QID ( oversedated- ? how pt. can be on both)- obtained pain management consult- recommended to decrease Oxycodone dose to 20 mg  every 4 hours as needed for breakthrough pain- I believe this dose is  still too high ,  decreases the frequency to every 8 hours as needed for breakthrough pain to avoid oversedation.  -pain management placed standing tylenol order- which was d/c to prevent tylenol toxicity. will only presribe tylenol on prn basis.      # Chronic sacral bedsore.  - Followed by Dr. Gooden    # Hypothyroidism- severe uncontrolled  - patient was on levothyroxine 50 mcg daily, increased to 100 MCG po once daily , will need to repeat thyroid profile in 2 weeks.  -obtain PTO abx, thyroglobulin level, US thyroid.      # Severe Vitamin D deficiency- on vitamin D tx.    daily DVT proph.    #Progress Note Handoff: monitor bsfs,  f/up pt. post op,  monitor BMP daily,  f/up thyroid US,   Family discussion: medical plan of tx. d/w pt. Disposition: to be determined .

## 2019-12-02 NOTE — CHART NOTE - NSCHARTNOTEFT_GEN_A_CORE
Post Operative Check    Patient is post op from a left guillotine below knee amputation. Patient is doing well but complaining of significant pain (10/10). He was nauseous earlier but it has since subsided and he is tolerating a RD. No chest pain or SOB.    and is     Vitals    T(C): 36.3 (12-02-19 @ 14:52), Max: 36.8 (12-01-19 @ 20:28)  HR: 78 (12-02-19 @ 14:52) (71 - 80)  BP: 117/66 (12-02-19 @ 14:52) (101/68 - 137/65)  RR: 18 (12-02-19 @ 14:52) (17 - 22)  SpO2: 94% (12-02-19 @ 14:52) (94% - 100%)  Wt(kg): --      12-01 @ 07:01  -  12-02 @ 07:00  --------------------------------------------------------  IN:  Total IN: 0 mL    OUT:    Indwelling Catheter - Suprapubic: 1200 mL  Total OUT: 1200 mL    Total NET: -1200 mL      12-02 @ 07:01  -  12-02 @ 17:31  --------------------------------------------------------  IN:    lactated ringers.: 50 mL  Total IN: 50 mL    OUT:    Indwelling Catheter - Suprapubic: 1600 mL  Total OUT: 1600 mL    Total NET: -1550 mL          Physical Exam  General: NAD AAOx3   Cards: RRR S1S2  Resp: CTAB  Abdomen: soft and non tender, non distended   Ext: b/l BKA, left dressed and in ACE wrap, some sanguinous discharge.     Labs  Labs:  CAPILLARY BLOOD GLUCOSE      POCT Blood Glucose.: 131 mg/dL (02 Dec 2019 16:42)  POCT Blood Glucose.: 79 mg/dL (02 Dec 2019 14:55)  POCT Blood Glucose.: 109 mg/dL (02 Dec 2019 11:44)  POCT Blood Glucose.: 66 mg/dL (02 Dec 2019 11:25)  POCT Blood Glucose.: 91 mg/dL (02 Dec 2019 09:40)  POCT Blood Glucose.: 71 mg/dL (02 Dec 2019 09:08)  POCT Blood Glucose.: 66 mg/dL (02 Dec 2019 08:02)  POCT Blood Glucose.: 146 mg/dL (01 Dec 2019 22:04)                          7.5    9.14  )-----------( 307      ( 02 Dec 2019 06:45 )             24.1       Auto Neutrophil %: 64.0 % (12-02-19 @ 06:45)  Auto Immature Granulocyte %: 0.3 % (12-02-19 @ 06:45)    12-02    133<L>  |  95<L>  |  43<H>  ----------------------------<  59<L>  4.0   |  24  |  4.0<H>      Calcium, Total Serum: 7.6 mg/dL (12-02-19 @ 06:45)      LFTs:             4.6  | <0.2 | 12       ------------------[160     ( 02 Dec 2019 06:45 )  1.5  | x    | 11          Lipase:x      Amylase:x           ABG - ( 28 Nov 2019 14:45 )  pH: 7.34  /  pCO2: 38    /  pO2: 133   / HCO3: 20    / Base Excess: -4.9  /  SaO2: 99              ABG - ( 28 Nov 2019 00:59 )  pH: 7.27  /  pCO2: 40    /  pO2: 36    / HCO3: 18    / Base Excess: -8.1  /  SaO2: 69              ABG - ( 27 Nov 2019 00:49 )  pH: 7.17  /  pCO2: 38    /  pO2: 112   / HCO3: 14    / Base Excess: -14.0 /  SaO2: 97                Coags:     16.70  ----< 1.46    ( 02 Dec 2019 06:45 )     37.8                Radiology: no post op studies       Patient is a 59y old Male s/p left BKA    Plan:  - pain control   - RD - OR Thursday for closure

## 2019-12-02 NOTE — CHART NOTE - NSCHARTNOTEFT_GEN_A_CORE
RD Limited Note    Pt in OR for L BKA. Unable to assess pt. Will see tomorrow for full comprehensive reassessment.

## 2019-12-02 NOTE — PROGRESS NOTE ADULT - ASSESSMENT
Mr. Booker is a 60 yo male patient with PMHx of PVD s/p R AKA, paraplegia with multiple chronic bedsores and suprapubic catheter, HTN (not on any medication), CKD stage IV (not following with nephrology), hypothyroidism, opioid dependance, recent admission due to unresponsiveness, presents for 3-4 days of left foot coldness, pain and worsening ulcers.    # Chronic left foot peripheral artery disease + dry gangrene  - Patient with chronic wound of left foot with gangrenous changes.   - Doppler US showed Moderate disease in the left SFA, No flow noted in the left posterior tibial artery   - Patient was not on any antiplatelets or statin.   - Evaluated by vascular surgery, plan for angiogram on Friday  - Cardio consulted by vascular for, patient for possible L BKA on Monday   - Echo results 55-60% , grade 1 pritesh dysfunction  - will keep on maintenance fluids, please hold off if O2 sat <92 percent    # Hypothyroidism  - will need to re-check thyroid in 2 weeks  - antibody work up sent  - Thyroid ultrasounds ordered  - levothyroxine to 100 mcg daily    # Poor PO intake, difficulty swallowing  - Patient with occasional difficulty swallowing at home  - dysphagia likely esophageal  - Speech and Swallow: need to follow up with GI  - pt not a candidate for Barium esophagram per Radiology  - f/u with GI post bka    - Hypotension- resolved  - (tachycardia + leukocytosis) on admission, leucocytosis today  - will make Valium PRN due to low bp  - s/p 1 unit pRBC 11/27/19   - Does not show any clinical signs of hypoperfusion  - STAT CXR: Unremarkable  - vancomycin 500 mg iv q24h  -Check trough before the 2nd dose  - Rocephin 2 gm iv q24h  - Flagyl 500 mg iv q8h      # SILVINA on CKD + HAGMA  - non- oliguric  - Patient with serum creatinin 4  - Patient now agreesing to dialysis  - Consider LINDY and Epogen  - will switch to d5% with sodium bicarb  - f/u Phos, f/u Mag, f/u bmp    # Normocytic anemia  - Possibly due to CKD, and multifactorial  - s/p 1 unit pRBC on 11/27  - Patient denies any melena, or hematochezia, noted history of hemorrhoids  - Will trend CBC    # HTN  - not on any medication  - off Amlodipine    # Opioid dependance  - Patient on methadone 80 mg QID +  - Start Oxycodone IR 20mg PO Q4hrs PRN ( will reduce to Q8hrs as per attending)   - s/p Narcan on 11/28  - high risk for overdose given ckd  - f/u pain managment recs as per Dr. Jackson    # Folate acid deficiency  - will start folic acid po qd    # Vitamin D def  - 4000 qd daily    # Chronic sacral bedsore.  - Followed by Dr. Gooden

## 2019-12-02 NOTE — PROGRESS NOTE ADULT - SUBJECTIVE AND OBJECTIVE BOX
BELLA MARGE  59y, Male    All available historical data reviewed    OVERNIGHT EVENTS:  no fevefs    ROS:  General: Denies rigors, nightsweats  HEENT: Denies headache, rhinorrhea, sore throat, eye pain  CV: Denies CP, palpitations  PULM: Denies wheezing, hemoptysis  GI: Denies hematemesis, hematochezia, melena  : Denies discharge, hematuria  MSK: Denies arthralgias, myalgias  SKIN: Denies rash, lesions  NEURO: Denies paresthesias, weakness  PSYCH: Denies depression, anxiety    VITALS:  T(F): 98.3, Max: 98.3 (12-02-19 @ 05:53)  HR: 74  BP: 137/65  RR: 17Vital Signs Last 24 Hrs  T(C): 36.8 (02 Dec 2019 05:53), Max: 36.8 (01 Dec 2019 14:26)  T(F): 98.3 (02 Dec 2019 05:53), Max: 98.3 (02 Dec 2019 05:53)  HR: 74 (02 Dec 2019 05:53) (69 - 74)  BP: 137/65 (02 Dec 2019 05:53) (101/68 - 137/65)  BP(mean): --  RR: 17 (02 Dec 2019 05:53) (17 - 18)  SpO2: --    TESTS & MEASUREMENTS:                        7.5    9.14  )-----------( 307      ( 02 Dec 2019 06:45 )             24.1     12-02    133<L>  |  95<L>  |  43<H>  ----------------------------<  59<L>  4.0   |  24  |  4.0<H>    Ca    7.6<L>      02 Dec 2019 06:45  Phos  4.8     12-02  Mg     1.9     11-30    TPro  4.6<L>  /  Alb  1.5<L>  /  TBili  <0.2  /  DBili  x   /  AST  12  /  ALT  11  /  AlkPhos  160<H>  12-02    LIVER FUNCTIONS - ( 02 Dec 2019 06:45 )  Alb: 1.5 g/dL / Pro: 4.6 g/dL / ALK PHOS: 160 U/L / ALT: 11 U/L / AST: 12 U/L / GGT: x             Culture - Blood (collected 11-27-19 @ 00:30)  Source: .Blood Blood-Peripheral  Final Report (12-02-19 @ 07:01):    No growth at 5 days.            RADIOLOGY & ADDITIONAL TESTS:  Personal review of radiological diagnostics performed  Echo and EKG results noted when applicable.     ANTIBIOTICS:  cefTRIAXone   IVPB 1000 milliGRAM(s) IV Intermittent every 24 hours  doxycycline hyclate Capsule 100 milliGRAM(s) Oral every 12 hours

## 2019-12-02 NOTE — PROGRESS NOTE ADULT - SUBJECTIVE AND OBJECTIVE BOX
SUBJECTIVE:  =     Today is hospital day 6d. No issues overnight, NPO for BKA today with surgery.    PAST MEDICAL & SURGICAL HISTORY  Anemia  PAD (peripheral artery disease)  Hypertension  Suprapubic catheter  Pressure ulcer  Diabetes  Lumbar compression fracture  S/P below knee amputation, right  S/P debridement  Toe amputation status, right  H/O hernia repair    ALLERGIES:  sulfamethizole (Unknown)    MEDICATIONS:  STANDING MEDICATIONS  ascorbic acid 500 milliGRAM(s) Oral daily  calcium acetate 667 milliGRAM(s) Oral three times a day with meals  calcium carbonate    500 mG (Tums) Chewable 1 Tablet(s) Chew every 8 hours  cefTRIAXone   IVPB 1000 milliGRAM(s) IV Intermittent every 24 hours  chlorhexidine 4% Liquid 1 Application(s) Topical <User Schedule>  cholecalciferol 4000 Unit(s) Oral daily  dextrose 5% 1000 milliLiter(s) IV Continuous <Continuous>  dextrose 50% Injectable 12.5 Gram(s) IV Push once  dextrose 50% Injectable 25 Gram(s) IV Push once  dextrose 50% Injectable 25 Gram(s) IV Push once  doxycycline hyclate Capsule 100 milliGRAM(s) Oral every 12 hours  ferrous    sulfate 325 milliGRAM(s) Oral two times a day  folic acid 1 milliGRAM(s) Oral daily  heparin  Injectable 5000 Unit(s) SubCutaneous every 12 hours  lactated ringers. 1000 milliLiter(s) IV Continuous <Continuous>  levothyroxine 100 MICROGram(s) Oral daily  methadone    Tablet 80 milliGRAM(s) Oral every 6 hours  oxybutynin 10 milliGRAM(s) Oral two times a day  pantoprazole    Tablet 40 milliGRAM(s) Oral every 12 hours    PRN MEDICATIONS  acetaminophen   Tablet .. 650 milliGRAM(s) Oral every 6 hours PRN  dextrose 40% Gel 15 Gram(s) Oral once PRN  diazepam    Tablet 5 milliGRAM(s) Oral two times a day PRN  glucagon  Injectable 1 milliGRAM(s) IntraMuscular once PRN  HYDROmorphone  Injectable 0.5 milliGRAM(s) IV Push every 10 minutes PRN  morphine  - Injectable 2 milliGRAM(s) IV Push every 10 minutes PRN  ondansetron Injectable 4 milliGRAM(s) IV Push once PRN  oxyCODONE    IR 20 milliGRAM(s) Oral every 8 hours PRN    VITALS:   T(F): 98.2  HR: 75  BP: 136/70  RR: 18  SpO2: 100%    LABS:                        7.5    9.14  )-----------( 307      ( 02 Dec 2019 06:45 )             24.1     12-02    133<L>  |  95<L>  |  43<H>  ----------------------------<  59<L>  4.0   |  24  |  4.0<H>    Ca    7.6<L>      02 Dec 2019 06:45  Phos  4.8     12-02    TPro  4.6<L>  /  Alb  1.5<L>  /  TBili  <0.2  /  DBili  x   /  AST  12  /  ALT  11  /  AlkPhos  160<H>  12-02    PT/INR - ( 02 Dec 2019 06:45 )   PT: 16.70 sec;   INR: 1.46 ratio         PTT - ( 02 Dec 2019 06:45 )  PTT:37.8 sec      < from: Transthoracic Echocardiogram (11.29.19 @ 09:12) >  Summary:   1. Left ventricular ejection fraction, by visual estimation, is 55 to   60%.   2. Normal global left ventricular systolic function.   3. Spectral Doppler shows impaired relaxation pattern of left   ventricular myocardial filling (Grade I diastolic dysfunction).   4. Normal right atrial size.   5. Trivial pericardial effusion.   6. Mild thickening and calcification of the anterior and posterior   mitral valve leaflets.   7.Moderate mitral annular calcification.   8. Sclerotic aortic valve with normal opening.    < end of copied text >        PHYSICAL EXAM:  GENERAL: NAD, lying in bed comfortably  HEAD:  Atraumatic, Normocephalic  EYES: conjunctiva and sclera clear  ENT: Moist mucous membranes  NECK: Supple, No JVD  CHEST/LUNG: Clear to auscultation bilaterally  HEART: Regular rate and rhythm; No murmurs  ABDOMEN: Bowel sounds present; Soft, Nontender, Nondistended. Suprapubic catheter noted, draining normal urine.  EXTREMITIES: Unable to palpate dp pulses on left. R sided BKA noted,  NERVOUS SYSTEM:  Alert & Oriented X3, speech clear. No deficits   MSK: FROM all 4 extremities, full and equal strength  SKIN: No rashes or

## 2019-12-03 NOTE — PROGRESS NOTE ADULT - ASSESSMENT
Mr. Booker is a 60 yo male patient with PMHx of PVD s/p R AKA, paraplegia with multiple chronic bedsores and suprapubic catheter, HTN (not on any medication), CKD stage IV (not following with nephrology), hypothyroidism, opioid dependance, recent admission due to unresponsiveness, presents for 3-4 days of left foot coldness, pain and worsening ulcers.    # Chronic left foot peripheral artery disease + dry gangrene  - Patient with chronic wound of left foot with gangrenous changes.   - Doppler US showed Moderate disease in the left SFA, No flow noted in the left posterior tibial artery   - Patient was not on any antiplatelets or statin.   - S/p BKA on 12/2/2019, schedule fro closure on 12/5/2019  - Echo results 55-60% , grade 1 pritesh dysfunction  - will keep on maintenance fluids, please hold off if O2 sat <92 percent    # Hypothyroidism  - will need to re-check thyroid in 2 weeks ( week of 12/15)  - antibody work up sent: - thyroglobuln and thyroidperoxidase negative thus far  - Thyroid ultrasounds: negative  - levothyroxine to 100 mcg daily      # Poor PO intake, difficulty swallowing  - Patient with occasional difficulty swallowing at home  - dysphagia likely esophageal  - Speech and Swallow: need to follow up with GI  - pt not a candidate for Barium esophagram per Radiology  - GI: for possible Endoscopy on 12/6/19    - Hypotension- resolved  - (tachycardia + leukocytosis) on admission, leucocytosis today  - will make Valium PRN due to low bp  - s/p 1 unit pRBC 11/27/19   - Does not show any clinical signs of hypoperfusion  - STAT CXR: Unremarkable  - vancomycin 500 mg iv q24h  - Check trough before the 2nd dose tonight  - Rocephin 2 gm iv q24h  - Flagyl 500 mg iv q8h      # SILVINA on CKD + HAGMA  - non- oliguric  - Patient agreed to have temporary dialysis during inpatient stay only, or following with nephrologist.   - Patient with serum creatinin 4  - Patient now agreesing to dialysis  - Consider LINDY and Epogen  - on sodium bicarb oral 650 mg bid  - can increase if Anion gap increases  - f/u Phos, f/u Mag, f/u bmp    # Normocytic anemia  - Iron deficiency and CKD,  multifactorial  - s/p 1 unit pRBC on 11/27  - Patient denies any melena, or hematochezia, noted history of hemorrhoids  - Will trend CBC    # HTN  - not on any medication  - off Amlodipine    # Opioid dependance  - Patient on methadone 80 mg QID +  - Start Oxycodone IR 20mg PO Q4hrs PRN ( will reduce to Q8hrs as per attending)   - s/p Narcan on 11/28  - high risk for overdose given ckd  - f/u pain managment recs as per Dr. Jackson    # Folate acid deficiency  - will start folic acid po qd    # Vitamin D def  - 4000 qd daily    # Chronic sacral bedsore.  - Followed by Dr. Gooden Mr. Booker is a 58 yo male patient with PMHx of PVD s/p R AKA, paraplegia with multiple chronic bedsores and suprapubic catheter, HTN (not on any medication), CKD stage IV (not following with nephrology), hypothyroidism, opioid dependance, recent admission due to unresponsiveness, presents for 3-4 days of left foot coldness, pain and worsening ulcers.    # Chronic left foot peripheral artery disease + dry gangrene  - Patient with chronic wound of left foot with gangrenous changes.   - Doppler US showed Moderate disease in the left SFA, No flow noted in the left posterior tibial artery   - Patient was not on any antiplatelets or statin.   - S/p BKA on 12/2/2019, schedule fro closure on 12/5/2019  - Echo results 55-60% , grade 1 pritesh dysfunction  - will keep on maintenance fluids, please hold off if O2 sat <92 percent    # Hypothyroidism  - will need to re-check thyroid in 2 weeks ( week of 12/15)  - antibody work up sent: - thyroglobuln and thyroidperoxidase negative thus far  - Thyroid ultrasounds: negative  - levothyroxine to 100 mcg daily      # Poor PO intake, difficulty swallowing  - Patient with occasional difficulty swallowing at home  - dysphagia likely esophageal  - Speech and Swallow: need to follow up with GI  - pt not a candidate for Barium esophagram per Radiology  - GI: for possible Endoscopy on 12/6/19    - Sacral OM and gangrene  - (tachycardia + leukocytosis) on admission  - made  Valium PRN due to low bp  - vancomycin 500 mg iv q24h  - Check trough before the 2nd dose tonight  - Rocephin 2 gm iv q24h  - Flagyl 500 mg iv q8h    # SILVINA on CKD + HAGMA  - non- oliguric  - Patient agreed to have temporary dialysis during inpatient stay only, or following with nephrologist.   - Patient with serum creatinin 4  - Patient now agreesing to dialysis  - Consider LINDY and Epogen  - on sodium bicarb oral 650 mg bid  - can increase if Anion gap increases  - f/u Phos, f/u Mag, f/u bmp    # Normocytic anemia  - Iron deficiency and CKD,  multifactorial  - s/p 1 unit pRBC on 11/27  - Patient denies any melena, or hematochezia, noted history of hemorrhoids  - Will trend CBC    # HTN  - not on any medication  - off Amlodipine    # Opioid dependance  - Patient on methadone 80 mg QID +  - Start Oxycodone IR 20mg PO Q4hrs PRN ( will reduce to Q8hrs as per attending)   - s/p Narcan on 11/28  - high risk for overdose given ckd  - f/u pain managment recs as per Dr. Jackson    # Folate acid deficiency  - will start folic acid po qd    # Vitamin D def  - 4000 qd daily    # Chronic sacral bedsore.  - Followed by Dr. Gooden Mr. Booker is a 58 yo male patient with PMHx of PVD s/p R AKA, paraplegia with multiple chronic bedsores and suprapubic catheter, HTN (not on any medication), CKD stage IV (not following with nephrology), hypothyroidism, opioid dependance, recent admission due to unresponsiveness, presents for 3-4 days of left foot coldness, pain and worsening ulcers.    # Chronic left foot peripheral artery disease + dry gangrene  - Patient with chronic wound of left foot with gangrenous changes.   - Doppler US showed Moderate disease in the left SFA, No flow noted in the left posterior tibial artery   - Patient was not on any antiplatelets or statin.   - S/p BKA on 12/2/2019, schedule fro closure on 12/5/2019  - Echo results 55-60% , grade 1 pritesh dysfunction  - will keep on maintenance fluids, please hold off if O2 sat <92 percent    hypoglycemia  -w d/c Dextrose infusion, pt. is eating now, continue close bsfs monitoring  -As per attending if still remains hypoglycemic obtain serum insulin levels, C-peptite, beta-hydroxybutyrate, sulfonylurea, serum proinsulin  -poor po intake prior to OR and left BKA  -normal serum cortisol levels    # Hypothyroidism  - will need to re-check thyroid in 2 weeks ( week of 12/15)  - antibody work up sent: - thyroglobuln and thyroidperoxidase negative thus far  - Thyroid ultrasounds: negative  - levothyroxine to 100 mcg daily    # Poor PO intake, difficulty swallowing  - Patient with occasional difficulty swallowing at home  - dysphagia likely esophageal  - Speech and Swallow: need to follow up with GI  - pt not a candidate for Barium esophagram per Radiology  - GI: for possible Endoscopy on 12/6/19    - Sacral OM and gangrene  - (tachycardia + leukocytosis) on admission  - made  Valium PRN due to low bp  - vancomycin 500 mg iv q24h  - Check trough before the 2nd dose tonight  - Rocephin 2 gm iv q24h  - Flagyl 500 mg iv q8h    # SILVINA on CKD + HAGMA  - non- oliguric  - Patient agreed to have temporary dialysis during inpatient stay only, or following with nephrologist.   - Patient with serum creatinin 4  - Patient now agreesing to dialysis  - Consider LINDY and Epogen  - on sodium bicarb oral 650 mg bid  - can increase if Anion gap increases  - f/u Phos, f/u Mag, f/u bmp    # Normocytic anemia  - Iron deficiency and CKD,  multifactorial  - s/p 1 unit pRBC on 11/27  - Patient denies any melena, or hematochezia, noted history of hemorrhoids  - Will trend CBC    # HTN  - not on any medication  - off Amlodipine    # Opioid dependance  - Patient on methadone 80 mg QID +  - Start Oxycodone IR 20mg PO Q4hrs PRN ( will reduce to Q8hrs as per attending)   - s/p Narcan on 11/28  - high risk for overdose given ckd  - f/u pain managment recs as per Dr. Jackson    # Folate acid deficiency  - will start folic acid po qd    # Vitamin D def  - 4000 qd daily    # Chronic sacral bedsore.  - Followed by Dr. Gooden

## 2019-12-03 NOTE — PROGRESS NOTE ADULT - SUBJECTIVE AND OBJECTIVE BOX
MARGE BELLA  [facility] T2-3A 024 [address] ([facility] T[facility])            Patient was evaluated and examined  by bedside, c/o  b/l legs pain, tolerating diet well, no fever            REVIEW OF SYSTEMS:  please see pertinent positives mentioned above, all other 12 ROS negative      T(C): , Max: 36.7 (12-02-19 @ 14:30)  HR: 77 (12-03-19 @ 13:05)  BP: 122/71 (12-03-19 @ 13:05)  RR: 17 (12-03-19 @ 13:05)  SpO2: 94% (12-02-19 @ 14:52)  CAPILLARY BLOOD GLUCOSE      POCT Blood Glucose.: 81 mg/dL (03 Dec 2019 12:25)  POCT Blood Glucose.: 89 mg/dL (03 Dec 2019 07:29)  POCT Blood Glucose.: 172 mg/dL (02 Dec 2019 20:26)  POCT Blood Glucose.: 131 mg/dL (02 Dec 2019 16:42)  POCT Blood Glucose.: 79 mg/dL (02 Dec 2019 14:55)      PHYSICAL EXAM:  GENERAL: NAD, AAOX3, patient is laying comfortably in bed  HEENT: AT, NC, PERRLA, SUPPLE, NO JVD, NO CB  LUNG: CTA B/L  CVS: normal S1, S2, RRR, NO M/G/R  ABDOMEN: soft, bowel sounds present, normoactive in all 4 quadrants, non-tender, non-distended  EXT: b/l BKA status, no E/C/C   NEURO: no acute focal neurological deficits  SKIN: b/l BKA status,   multiple tattoes present.          LAB  CBC  Date: 12-03-19 @ 07:15  Mean cell Ltwjlxizzu59.9  Mean cell Hemoglobin Conc31.5  Mean cell Volum 88.5  Platelet count-Automate 301  RBC Count 2.69  Red Cell Distrib Width15.0  WBC Count10.61  % Albumin, Urine--  Hematocrit 23.8  Hemoglobin 7.5  CBC  Date: 12-02-19 @ 06:45  Mean cell Gimyhmtfbz70.7  Mean cell Hemoglobin Conc31.1  Mean cell Volum 88.9  Platelet count-Automate 307  RBC Count 2.71  Red Cell Distrib Width15.3  WBC Count9.14  % Albumin, Urine--  Hematocrit 24.1  Hemoglobin 7.5  CBC  Date: 12-01-19 @ 07:09  Mean cell Javwmzjjrp27.0  Mean cell Hemoglobin Conc32.1  Mean cell Volum 87.5  Platelet count-Automate 317  RBC Count 2.71  Red Cell Distrib Width15.6  WBC Count8.69  % Albumin, Urine--  Hematocrit 23.7  Hemoglobin 7.6  CBC  Date: 11-30-19 @ 11:40  Mean cell Bhgffyqddc40.7  Mean cell Hemoglobin Conc31.7  Mean cell Volum 87.6  Platelet count-Automate 326  RBC Count 2.74  Red Cell Distrib Width15.6  WBC Count8.85  % Albumin, Urine--  Hematocrit 24.0  Hemoglobin 7.6  CBC  Date: 11-29-19 @ 07:32  Mean cell Qmoelwnmzi45.9  Mean cell Hemoglobin Conc31.7  Mean cell Volum 87.9  Platelet count-Automate 332  RBC Count 2.80  Red Cell Distrib Width16.0  WBC Count14.01  % Albumin, Urine--  Hematocrit 24.6  Hemoglobin 7.8  CBC  Date: 11-28-19 @ 06:29  Mean cell Mipwssrmnz67.1  Mean cell Hemoglobin Conc32.1  Mean cell Volum 87.8  Platelet count-Automate 337  RBC Count 2.70  Red Cell Distrib Width16.0  WBC Count9.07  % Albumin, Urine--  Hematocrit 23.7  Hemoglobin 7.6  CBC  Date: 11-27-19 @ 18:13  Mean cell Khsvxltgzd42.7  Mean cell Hemoglobin Conc31.1  Mean cell Volum 89.1  Platelet count-Automate 335  RBC Count 2.74  Red Cell Distrib Width16.0  WBC Count8.50  % Albumin, Urine--  Hematocrit 24.4  Hemoglobin 7.6  CBC  Date: 11-27-19 @ 06:17  Mean cell Sfhlnesxva16.6  Mean cell Hemoglobin Conc31.2  Mean cell Volum 88.6  Platelet count-Automate 356  RBC Count 2.46  Red Cell Distrib Width16.1  WBC Count9.30  % Albumin, Urine--  Hematocrit 21.8  Hemoglobin 6.8  CBC  Date: 11-27-19 @ 00:30  Mean cell Itblfhkilf40.2  Mean cell Hemoglobin Conc30.3  Mean cell Volum 89.9  Platelet count-Automate 363  RBC Count 2.57  Red Cell Distrib Width16.1  WBC Count10.52  % Albumin, Urine--  Hematocrit 23.1  Hemoglobin 7.0  CBC  Date: 11-26-19 @ 18:30  Mean cell Sztnjxbahj81.8  Mean cell Hemoglobin Conc30.6  Mean cell Volum 91.0  Platelet count-Automate 389  RBC Count 2.77  Red Cell Distrib Width16.3  WBC Count11.53  % Albumin, Urine--  Hematocrit 25.2  Hemoglobin 7.7    [facility]  12-03-19 @ 07:15  Blood Gas Arterial-Calcium,Ionized--  Blood Urea Nitrogen, Serum 43 mg/dL<H> [10 - 20]  Carbon Dioxide, Serum24 mmol/L [17 - 32]  Chloride, Serum95 mmol/L<L> [98 - 110]  Creatinie, Serum3.8 mg/dL<H> [0.7 - 1.5]  Glucose, Serum88 mg/dL [70 - 99]  Potassium, Serum4.0 mmol/L [3.5 - 5.0]  Sodium, Serum 133 mmol/L<L> [135 - 146]  BMP  12-02-19 @ 06:45  Blood Gas Arterial-Calcium,Ionized--  Blood Urea Nitrogen, Serum 43 mg/dL<H> [10 - 20]  Carbon Dioxide, Serum24 mmol/L [17 - 32]  Chloride, Serum95 mmol/L<L> [98 - 110]  Creatinie, Serum4.0 mg/dL<H> [0.7 - 1.5]  Glucose, Serum59 mg/dL<L> [70 - 99]  Potassium, Serum4.0 mmol/L [3.5 - 5.0]  Sodium, Serum 133 mmol/L<L> [135 - 146]              PT/INR - ( 02 Dec 2019 06:45 )   PT: 16.70 sec;   INR: 1.46 ratio         PTT - ( 02 Dec 2019 06:45 )  PTT:37.8 sec      Microbiology:    Culture - Blood (collected 11-27-19 @ 00:30)  Source: .Blood Blood-Peripheral  Final Report (12-02-19 @ 07:01):    No growth at 5 days.        Medications:  acetaminophen   Tablet .. 650 milliGRAM(s) Oral every 6 hours PRN  ascorbic acid 500 milliGRAM(s) Oral daily  calcium acetate 667 milliGRAM(s) Oral three times a day with meals  calcium carbonate    500 mG (Tums) Chewable 1 Tablet(s) Chew every 8 hours  cefTRIAXone   IVPB 2000 milliGRAM(s) IV Intermittent every 24 hours  chlorhexidine 4% Liquid 1 Application(s) Topical <User Schedule>  cholecalciferol 4000 Unit(s) Oral daily  dextrose 40% Gel 15 Gram(s) Oral once PRN  dextrose 50% Injectable 12.5 Gram(s) IV Push once  dextrose 50% Injectable 25 Gram(s) IV Push once  dextrose 50% Injectable 25 Gram(s) IV Push once  diazepam    Tablet 5 milliGRAM(s) Oral two times a day PRN  doxycycline hyclate Capsule 100 milliGRAM(s) Oral every 12 hours  ferrous    sulfate 325 milliGRAM(s) Oral two times a day  folic acid 1 milliGRAM(s) Oral daily  glucagon  Injectable 1 milliGRAM(s) IntraMuscular once PRN  heparin  Injectable 5000 Unit(s) SubCutaneous every 12 hours  levothyroxine 100 MICROGram(s) Oral daily  methadone    Tablet 80 milliGRAM(s) Oral every 6 hours  oxybutynin 10 milliGRAM(s) Oral two times a day  oxyCODONE    IR 20 milliGRAM(s) Oral every 8 hours PRN  pantoprazole    Tablet 40 milliGRAM(s) Oral every 12 hours  sodium bicarbonate 650 milliGRAM(s) Oral two times a day  vancomycin  IVPB 500 milliGRAM(s) IV Intermittent every 24 hours        Assessment and Plan:  Mr. Bella is a 60 yo male patient with PMHx of PVD s/p Right AKA, paraplegia with multiple chronic bedsores and chronic  suprapubic catheter, HTN (not on any medication), CKD stage IV (not following with nephrology), hypothyroidism, opioid dependance, recent admission due to unresponsiveness, presents for 3-4 days of left foot coldness, pain and worsening ulcers.    # Chronic left foot peripheral artery disease + dry gangrene  - Patient with chronic wound of left foot with gangrenous changes.   - Doppler US showed Moderate disease in the left SFA, No flow noted in the left posterior tibial artery   - Patient was not on any antiplatelets or statin.   - Evaluated by vascular surgery, s/p left BKA   -Post op care as per Surgical team recommendations.  - Will continue ceftriaxone +d/c doxy, added on IV Vanco, with vanco level monitoring,  added on IV Flagyl tx.- as per  ID  recommendations      # hypoglycemia-will d/c Dextrose infusion, pt. is eating now, continue close bsfs monitoring  - if still remains hypoglycemic obtain serum insulin levels, C-peptite, beta-hydroxybutyrate, sulfonylurea, serum proinsulin  -? in setting of hypothyroidism,   -poor po intake prior to OR and left BKA  -normal serum cortisol levels      # Poor PO intake- patient was eating today w/o difficulty.  - Patient with occasional difficulty swallowing at home for solid food only  - as per GI - ? EGD during this hospitalization stay.    - Hypotension- resolved post decrease of opioids dose tx.      Anemia due to Iron deficiency and CKD  - s/p 1 unit pRBC 11/27/19    -h/h remains low stable, started on PO iron tx.  - folate- borderline low and vitamin B 12 level- normal  -added on daily folate tx.    # SILVINA on CKD stage 4 + HAGMA  - Patient with serum creatinine 4 up from 3.7  - Patient agreed to have temporary dialysis during inpatient stay only, or following with nephrologist.   -Nephrology on Board.  - d/c IV sodium bicarb, start on Oral Bicarb supplement.  - started phoslo,   - Consider LINDY and Epogen  -avoid nephrotoxic agents.      #h/o  HTN- now normotensive  -continue to monitor VS    # Opioid dependance  - Patient on methadone 80 mg QID + Oxycontin 60 mg QID ( oversedated- ? how pt. can be on both)- obtained pain management consult- recommended to decrease Oxycodone dose to 20 mg  every 4 hours as needed for breakthrough pain- I believe this dose is  still too high ,  decreases the frequency to every 8 hours as needed for breakthrough pain to avoid oversedation.  -pain management placed standing tylenol order- which was d/c to prevent tylenol toxicity. will only presribe tylenol on prn basis.      # Chronic sacral bedsore.  - Followed by Dr. Gooden    # Hypothyroidism- severe uncontrolled  - patient was on levothyroxine 50 mcg daily, increased to 100 MCG po once daily , will need to repeat thyroid profile in 2 weeks.  -obtain PTO abx, thyroglobulin level,   US thyroid- no nodules      # Severe Vitamin D deficiency- on vitamin D tx.    daily DVT proph.    #Progress Note Handoff: monitor bsfs, post op care,   monitor BMP daily, GI for EGD    Family discussion: medical plan of tx. d/w pt. Disposition: to be determined .

## 2019-12-03 NOTE — PROGRESS NOTE ADULT - ASSESSMENT
Mr. Booker is a 58 yo male patient with Pmhx of PVD s/p R AKA, paraplegia with multiple chronic bedsores and suprapubic catheter, HTN (not on any medication), CKD stage IV (not following with nephrology), hypothyroidism, opioid dependance, recent admission due to unresponsiveness, presents for 3-4 days of left foot coldness, pain and worsening ulcers.  Patient being treated for L Foot Gangrene and SILVINA. GI called for dysphagia.    # Intermittent Dysphagia to solid rule out malignancy vs esophageal web/ stricture/ zencker   Pt had foot surgery on dec 2 and he is scheduled for closure this thursday   Patient and his wife they are worried about the EGD because of the patient infection and recent surgery and they prefer to hold on any intervention for now     Rec:   Pt would benefits from EGD. Will discuss with family and medical team the benefits and risks of procedure and if the patient and family agreeable will consider EGD on friday or monday   PPI BID  Needs colonoscopy that can be done as outpatient

## 2019-12-03 NOTE — PROGRESS NOTE ADULT - SUBJECTIVE AND OBJECTIVE BOX
BELLA, MARGE  59y, Male    All available historical data reviewed    OVERNIGHT EVENTS:  s/p Left BKA    ROS:  General: Denies rigors, nightsweats  HEENT: Denies headache, rhinorrhea, sore throat, eye pain  CV: Denies CP, palpitations  PULM: Denies wheezing, hemoptysis  GI: Denies hematemesis, hematochezia, melena  : Denies discharge, hematuria  MSK: Denies arthralgias, myalgias  SKIN: Denies rash, lesions  NEURO: Denies paresthesias, weakness  PSYCH: Denies depression, anxiety    VITALS:  T(F): 96.6, Max: 98.3 (12-02-19 @ 11:23)  HR: 74  BP: 136/68  RR: 18Vital Signs Last 24 Hrs  T(C): 35.9 (03 Dec 2019 06:00), Max: 36.8 (02 Dec 2019 11:23)  T(F): 96.6 (03 Dec 2019 06:00), Max: 98.3 (02 Dec 2019 11:23)  HR: 74 (03 Dec 2019 06:00) (69 - 80)  BP: 136/68 (03 Dec 2019 06:00) (112/62 - 136/73)  BP(mean): --  RR: 18 (03 Dec 2019 06:00) (18 - 22)  SpO2: 94% (02 Dec 2019 14:52) (94% - 100%)    TESTS & MEASUREMENTS:                        7.5    10.61 )-----------( 301      ( 03 Dec 2019 07:15 )             23.8     12-03    133<L>  |  95<L>  |  43<H>  ----------------------------<  88  4.0   |  24  |  3.8<H>    Ca    7.7<L>      03 Dec 2019 07:15  Phos  4.9     12-03    TPro  4.5<L>  /  Alb  1.5<L>  /  TBili  <0.2  /  DBili  x   /  AST  11  /  ALT  7   /  AlkPhos  151<H>  12-03    LIVER FUNCTIONS - ( 03 Dec 2019 07:15 )  Alb: 1.5 g/dL / Pro: 4.5 g/dL / ALK PHOS: 151 U/L / ALT: 7 U/L / AST: 11 U/L / GGT: x             Culture - Blood (collected 11-27-19 @ 00:30)  Source: .Blood Blood-Peripheral  Final Report (12-02-19 @ 07:01):    No growth at 5 days.            RADIOLOGY & ADDITIONAL TESTS:  Personal review of radiological diagnostics performed  Echo and EKG results noted when applicable.     ANTIBIOTICS:  cefTRIAXone   IVPB 2000 milliGRAM(s) IV Intermittent every 24 hours  doxycycline hyclate Capsule 100 milliGRAM(s) Oral every 12 hours  vancomycin  IVPB 500 milliGRAM(s) IV Intermittent every 24 hours

## 2019-12-03 NOTE — PROGRESS NOTE ADULT - ASSESSMENT
· Assessment		  Mr. Booker is a 60 yo male patient with Pmhx of PVD s/p R AKA, paraplegia with multiple chronic bedsores and suprapubic catheter, HTN (not on any medication), CKD stage IV (not following with nephrology), hypothyroidism, opioid dependance, recent admission due to unresponsiveness, presents for 3-4 days of left foot coldness, pain and worsening ulcers.  Patient went today to see Dr. Gooden who sent patient to ED. He noted that he has had left foot ulcers for the last 5 years, course was waxing and waning and was stable with dressing. Lately he felt his wound is getting worse and his foot felt cold. Denies any fever, chills. Patient denying any cp or sob. No GI or  complaints.  In ED, patient afebrile, tachycardic hypertensive. He was given 2L of LR, and one dose of vancomycin. Patient was seen by vascular surgery and arterial doppler US of the right LE was done.     IMPRESSION:  # Severe PAD with ischemic LLE  -s/p L BKA 12/2  -s/p Right BKA  -WBC 10.6  # Chronic sacral OM with cellulitis      RECOMMENDATIONS:  Vancomycin 500 mg iv q24h  Check trough before the 2nd dose  Rocephin 2 gm iv q24h  Flagyl 500 mg iv q8h  Off loading

## 2019-12-03 NOTE — PROGRESS NOTE ADULT - ASSESSMENT
59/M with SILVINA on CKD 4 (baseline creat 3.8 mg% Oct 2019) SPC, severe PVD, presented with cold Lt LE and ulcers, found to have severe anemia Hb 6.8 and acidosis.    SILVINA on CKD 4  prerenal vs ATN (ischemic) severe hypotension  # non oliguric  # creatinine stable   # last ph at goal, on binders, PTH noted, no need for 1-25 vit D   # h/h noted, no need for venofer sat in the 30 range, will start LINDY once off ATB , might need blood tx   #check vanco level  # patient with significant decrease in GFR in view of low muscle mass ( amputations)  # patient is refusing hd , no acute indication   #will follow

## 2019-12-03 NOTE — PROGRESS NOTE ADULT - SUBJECTIVE AND OBJECTIVE BOX
seen and examined  no distress  no new complaints         PAST HISTORY  --------------------------------------------------------------------------------  No significant changes to PMH, PSH, FHx, SHx, unless otherwise noted    ALLERGIES & MEDICATIONS  --------------------------------------------------------------------------------  Allergies    sulfamethizole (Unknown)        Standing Inpatient Medications  ascorbic acid 500 milliGRAM(s) Oral daily  calcium acetate 667 milliGRAM(s) Oral three times a day with meals  calcium carbonate    500 mG (Tums) Chewable 1 Tablet(s) Chew every 8 hours  cefTRIAXone   IVPB 2000 milliGRAM(s) IV Intermittent every 24 hours  chlorhexidine 4% Liquid 1 Application(s) Topical <User Schedule>  cholecalciferol 4000 Unit(s) Oral daily  dextrose 5% 1000 milliLiter(s) IV Continuous <Continuous>  dextrose 50% Injectable 12.5 Gram(s) IV Push once  dextrose 50% Injectable 25 Gram(s) IV Push once  dextrose 50% Injectable 25 Gram(s) IV Push once  doxycycline hyclate Capsule 100 milliGRAM(s) Oral every 12 hours  ferrous    sulfate 325 milliGRAM(s) Oral two times a day  folic acid 1 milliGRAM(s) Oral daily  heparin  Injectable 5000 Unit(s) SubCutaneous every 12 hours  levothyroxine 100 MICROGram(s) Oral daily  methadone    Tablet 80 milliGRAM(s) Oral every 6 hours  oxybutynin 10 milliGRAM(s) Oral two times a day  pantoprazole    Tablet 40 milliGRAM(s) Oral every 12 hours  vancomycin  IVPB 500 milliGRAM(s) IV Intermittent every 24 hours    PRN Inpatient Medications  acetaminophen   Tablet .. 650 milliGRAM(s) Oral every 6 hours PRN  dextrose 40% Gel 15 Gram(s) Oral once PRN  diazepam    Tablet 5 milliGRAM(s) Oral two times a day PRN  glucagon  Injectable 1 milliGRAM(s) IntraMuscular once PRN  oxyCODONE    IR 20 milliGRAM(s) Oral every 8 hours PRN      VITALS/PHYSICAL EXAM  --------------------------------------------------------------------------------  T(C): 35.9 (12-03-19 @ 06:00), Max: 36.8 (12-02-19 @ 11:23)  HR: 74 (12-03-19 @ 06:00) (69 - 80)  BP: 136/68 (12-03-19 @ 06:00) (112/62 - 136/73)  RR: 18 (12-03-19 @ 06:00) (18 - 22)  SpO2: 94% (12-02-19 @ 14:52) (94% - 100%)  Wt(kg): --  Height (cm): 185.42 (12-02-19 @ 12:13)  Weight (kg): 63.4 (12-02-19 @ 12:13)  BMI (kg/m2): 18.4 (12-02-19 @ 12:13)  BSA (m2): 1.85 (12-02-19 @ 12:13)      12-02-19 @ 07:01  -  12-03-19 @ 07:00  --------------------------------------------------------  IN: 50 mL / OUT: 3000 mL / NET: -2950 mL      Physical Exam:  	Gen: NAD  	Pulm: decrease BS B/L  	CV:  S1S2; no rub  	Abd:  soft, nontender/nondistended  	: SPC   	LE: b/l amputations   	    LABS/STUDIES  --------------------------------------------------------------------------------              7.5    9.14  >-----------<  307      [12-02-19 @ 06:45]              24.1     133  |  95  |  43  ----------------------------<  59      [12-02-19 @ 06:45]  4.0   |  24  |  4.0        Ca     7.6     [12-02-19 @ 06:45]      Phos  4.8     [12-02-19 @ 06:45]    TPro  4.6  /  Alb  1.5  /  TBili  <0.2  /  DBili  x   /  AST  12  /  ALT  11  /  AlkPhos  160  [12-02-19 @ 06:45]    PT/INR: PT 16.70, INR 1.46       [12-02-19 @ 06:45]  PTT: 37.8       [12-02-19 @ 06:45]      Creatinine Trend:  SCr 4.0 [12-02 @ 06:45]  SCr 4.2 [12-01 @ 07:09]  SCr 4.1 [11-30 @ 11:40]  SCr 4.2 [11-29 @ 07:32]  SCr 4.7 [11-28 @ 06:29]    Urinalysis - [11-27-19 @ 11:58]      Color Light Yellow / Appearance Slightly Turbid / SG 1.007 / pH 6.5      Gluc Trace / Ketone Negative  / Bili Negative / Urobili <2 mg/dL       Blood Trace / Protein 300 mg/dL / Leuk Est Large / Nitrite Positive      RBC 1 / WBC 59 / Hyaline 4 / Gran  / Sq Epi  / Non Sq Epi 1 / Bacteria Many    Urine Creatinine 17      [11-27-19 @ 11:58]  Urine Sodium 31.0      [11-27-19 @ 11:58]  Urine Urea Nitrogen 152      [11-27-19 @ 11:58]  Urine Potassium 9      [11-27-19 @ 11:58]    Iron 27, TIBC 89, %sat 30      [11-27-19 @ 06:17]  Ferritin 679      [11-27-19 @ 06:17]  PTH -- (Ca 7.8)      [11-28-19 @ 06:29]   99  PTH -- (Ca 7.0)      [10-14-19 @ 19:45]   209  PTH -- (Ca 7.8)      [07-02-19 @ 10:54]   71  Vitamin D (25OH) <5      [10-14-19 @ 19:45]  HbA1c 5.5      [06-27-19 @ 06:35]  TSH 72.80      [11-29-19 @ 07:32]

## 2019-12-03 NOTE — CHART NOTE - NSCHARTNOTEFT_GEN_A_CORE
Registered Dietitian Follow-Up     Patient Profile Reviewed                           Yes [x]   No []     Nutrition History Previously Obtained        Yes [x]  No []       Pertinent Subjective Information: Pt reports certain foods getting stuck in esophagus (noted rice, pasta, and pepper steak last night). All other foods are tolerated. Observed pt eating at ~45 degree angle, encouraged pt to sit up right. C/o hamburger being too tough - encouraged to add extra amado. Calorie count started this AM. Pt reported breakfast "came back up" but RN was unaware. Pt still declining nutrition supplements, double portions, and changing diet texture. Family brought in lentil soup that he may try. Family interested in renal diet education - provided handouts     Pertinent Medical Interventions: s/p L BKA by Vascular. Renal following for SILVINA on CKD 4. Pt refusing hd , no acute indication. Severe PAD with ischemic LLE - ID. Poor PO intake, difficulty swallowing - pt not a candidate for Barium esophagram per Radiology.      Diet order: DASH/TLC, Renal restrictions      Anthropometrics:  - Ht. 185.4 cm   - Wt. 63.4 kg 11/27 no new weights post op   - %wt change  - BMI 20.9 adjusted for b/l BKA - may be inaccurate w/o new weight   - IBW     Pertinent Lab Data:  (12/3) H/H 7,5/23.8  Na 133  Cl 95  BUN 43  Cr 3.8  POCT glucose 81. 89     Pertinent Meds: heparin, abx, vit C, phoslo, tums, vit D, iron, folic acid, protonix     Physical Findings:  - Appearance: alert, mild fat wasting to triceps  - GI function: LBM 12/2 very small per Pt   - Tubes:  - Oral/Mouth cavity: c/o food getting stuck in esophagus   - Skin: b/l BKA, Pressure ulcers: stg 3 sacral spinal, R buttocks, R knee. stg 2 L IT     Nutrition Requirements  Weight Used: 63.4 kg      calorie needs: 1902 - 2219 kcals/day (30-35 kcals/kg for pressure ulcer)  protein needs: 63 - 75 gms/day (1.0 - 1.2  gm/kg for CKD4 and PU's)  fluid needs: per renal     Nutrient Intake:  not meeting needs. will verify w/ Calorie count results         [] Previous Nutrition Diagnosis: Inadequate Oral Intake.            [x] Ongoing          [] Resolved    [] No active nutrition diagnosis identified at this time     NEW Nutrition Diagnostic #1  Problem: Increased Nutrient Needs  Etiology: wound healing   Statement: Pressure ulcers: stg 3 sacral spinal, R buttocks, R knee. stg 2 L IT + s/p L BKA       Nutrition Intervention meal/snack, nutr education      Goal/Expected Outcome: Pt to consume >75% of meal tray in 3 days      Indicator/Monitoring:  RD to monitor energy intake, diet order, body comp, NFPF, renal profile     Recommendation: Consider prokinetic agent and MVI w/o minerals daily. Cont current diet order. Will f/u with results from calorie Count in 3 days Registered Dietitian Follow-Up     Patient Profile Reviewed                           Yes [x]   No []     Nutrition History Previously Obtained        Yes [x]  No []       Pertinent Subjective Information: Pt reports certain foods getting stuck in esophagus (noted rice, pasta, and pepper steak last night). All other foods are tolerated. Observed pt eating at ~45 degree angle, encouraged pt to sit up right. C/o hamburger being too tough - encouraged to add extra amado. Calorie count started this AM. Pt reported breakfast "came back up" but RN was unaware. Pt still declining nutrition supplements, double portions, and changing diet texture. Family brought in lentil soup that he may try. Family interested in renal diet education - provided handouts     Pertinent Medical Interventions: s/p L BKA by Vascular. Renal following for SILVINA on CKD 4. Pt refusing hd , no acute indication. Severe PAD with ischemic LLE - ID. Poor PO intake, difficulty swallowing - pt not a candidate for Barium esophagram per Radiology.      Diet order: DASH/TLC, Renal restrictions      Anthropometrics:  - Ht. 185.4 cm   - Wt. 63.4 kg 11/27 no new weights post op   - %wt change  - BMI 20.9 adjusted for b/l BKA - may be inaccurate w/o new weight   - IBW     Pertinent Lab Data:  (12/3) H/H 7,5/23.8  Na 133  Cl 95  BUN 43  Cr 3.8  POCT glucose 81. 89     Pertinent Meds: heparin, abx, vit C, phoslo, tums, vit D, iron, folic acid, protonix     Physical Findings:  - Appearance: alert, mild fat wasting to triceps  - GI function: LBM 12/2 very small per Pt   - Tubes:  - Oral/Mouth cavity: Per SLP eval, +toleration for thin liquids w/o overt s/s penetration/aspiration. Pt w/ immediate vomiting post trials of thin liquids. No solid trials presented, however symptoms appear to be r/t possible presence esophageal dysphagia.  - Skin: b/l BKA, Pressure ulcers: stg 3 sacral spinal, R buttocks, R knee. stg 2 L IT     Nutrition Requirements  Weight Used: 63.4 kg      calorie needs: 1902 - 2219 kcals/day (30-35 kcals/kg for pressure ulcer)  protein needs: 63 - 75 gms/day (1.0 - 1.2  gm/kg for CKD4 and PU's)  fluid needs: per renal     Nutrient Intake:  not meeting needs. will verify w/ Calorie count results         [] Previous Nutrition Diagnosis: Inadequate Oral Intake.            [x] Ongoing          [] Resolved    [] No active nutrition diagnosis identified at this time     NEW Nutrition Diagnostic #1  Problem: Increased Nutrient Needs  Etiology: wound healing   Statement: Pressure ulcers: stg 3 sacral spinal, R buttocks, R knee. stg 2 L IT + s/p L BKA       Nutrition Intervention meal/snack, nutr education      Goal/Expected Outcome: Pt to consume >75% of meal tray in 3 days      Indicator/Monitoring:  RD to monitor energy intake, diet order, body comp, NFPF, renal profile     Recommendation: Consider prokinetic agent and MVI w/o minerals daily. Cont current diet order. Will f/u with results from calorie Count in 3 days

## 2019-12-03 NOTE — PROGRESS NOTE ADULT - SUBJECTIVE AND OBJECTIVE BOX
SUBJECTIVE:    Today is hospital day 7d. This morning he is resting comfortably in bed and reports no new issues or overnight events.     PAST MEDICAL & SURGICAL HISTORY  Anemia  PAD (peripheral artery disease)  Hypertension  Suprapubic catheter  Pressure ulcer  Diabetes  Lumbar compression fracture  S/P below knee amputation, right  S/P debridement  Toe amputation status, right  H/O hernia repair    ALLERGIES:  sulfamethizole (Unknown)    MEDICATIONS:  STANDING MEDICATIONS  ascorbic acid 500 milliGRAM(s) Oral daily  calcium acetate 667 milliGRAM(s) Oral three times a day with meals  calcium carbonate    500 mG (Tums) Chewable 1 Tablet(s) Chew every 8 hours  cefTRIAXone   IVPB 2000 milliGRAM(s) IV Intermittent every 24 hours  chlorhexidine 4% Liquid 1 Application(s) Topical <User Schedule>  cholecalciferol 4000 Unit(s) Oral daily  dextrose 50% Injectable 12.5 Gram(s) IV Push once  dextrose 50% Injectable 25 Gram(s) IV Push once  dextrose 50% Injectable 25 Gram(s) IV Push once  ferrous    sulfate 325 milliGRAM(s) Oral two times a day  folic acid 1 milliGRAM(s) Oral daily  heparin  Injectable 5000 Unit(s) SubCutaneous every 12 hours  levothyroxine 100 MICROGram(s) Oral daily  methadone    Tablet 80 milliGRAM(s) Oral every 6 hours  metroNIDAZOLE  IVPB 500 milliGRAM(s) IV Intermittent every 8 hours  oxybutynin 10 milliGRAM(s) Oral two times a day  pantoprazole    Tablet 40 milliGRAM(s) Oral every 12 hours  sodium bicarbonate 650 milliGRAM(s) Oral two times a day  vancomycin  IVPB 500 milliGRAM(s) IV Intermittent every 24 hours    PRN MEDICATIONS  acetaminophen   Tablet .. 650 milliGRAM(s) Oral every 6 hours PRN  dextrose 40% Gel 15 Gram(s) Oral once PRN  diazepam    Tablet 5 milliGRAM(s) Oral two times a day PRN  glucagon  Injectable 1 milliGRAM(s) IntraMuscular once PRN  oxyCODONE    IR 20 milliGRAM(s) Oral every 8 hours PRN    VITALS:   T(F): 98  HR: 77  BP: 122/71  RR: 17  SpO2: --    LABS:                        7.5    10.61 )-----------( 301      ( 03 Dec 2019 07:15 )             23.8     12-03    133<L>  |  95<L>  |  43<H>  ----------------------------<  88  4.0   |  24  |  3.8<H>    Ca    7.7<L>      03 Dec 2019 07:15  Phos  4.9     12-03    TPro  4.5<L>  /  Alb  1.5<L>  /  TBili  <0.2  /  DBili  x   /  AST  11  /  ALT  7   /  AlkPhos  151<H>  12-03    PT/INR - ( 02 Dec 2019 06:45 )   PT: 16.70 sec;   INR: 1.46 ratio         PTT - ( 02 Dec 2019 06:45 )  PTT:37.8 sec      PHYSICAL EXAM:  GENERAL: NAD, lying in bed comfortably  HEAD:  Atraumatic, Normocephalic  EYES: conjunctiva and sclera clear  ENT: Moist mucous membranes  CHEST/LUNG: Clear to auscultation bilaterally  HEART: Regular rate and rhythm; No murmurs  ABDOMEN: Bowel sounds present; Soft, Nontender, Nondistended. Suprapubic catheter noted, draining normal urine.  EXTREMITIES:  R sided BKA noted, new left sided BKA ( dressing with bloody discharge  NERVOUS SYSTEM:  Alert & Oriented X3, speech clear. No deficits SUBJECTIVE:    Today is hospital day 7d. S/p BKA day 1.    PAST MEDICAL & SURGICAL HISTORY  Anemia  PAD (peripheral artery disease)  Hypertension  Suprapubic catheter  Pressure ulcer  Diabetes  Lumbar compression fracture  S/P below knee amputation, right  S/P debridement  Toe amputation status, right  H/O hernia repair    ALLERGIES:  sulfamethizole (Unknown)    MEDICATIONS:  STANDING MEDICATIONS  ascorbic acid 500 milliGRAM(s) Oral daily  calcium acetate 667 milliGRAM(s) Oral three times a day with meals  calcium carbonate    500 mG (Tums) Chewable 1 Tablet(s) Chew every 8 hours  cefTRIAXone   IVPB 2000 milliGRAM(s) IV Intermittent every 24 hours  chlorhexidine 4% Liquid 1 Application(s) Topical <User Schedule>  cholecalciferol 4000 Unit(s) Oral daily  dextrose 50% Injectable 12.5 Gram(s) IV Push once  dextrose 50% Injectable 25 Gram(s) IV Push once  dextrose 50% Injectable 25 Gram(s) IV Push once  ferrous    sulfate 325 milliGRAM(s) Oral two times a day  folic acid 1 milliGRAM(s) Oral daily  heparin  Injectable 5000 Unit(s) SubCutaneous every 12 hours  levothyroxine 100 MICROGram(s) Oral daily  methadone    Tablet 80 milliGRAM(s) Oral every 6 hours  metroNIDAZOLE  IVPB 500 milliGRAM(s) IV Intermittent every 8 hours  oxybutynin 10 milliGRAM(s) Oral two times a day  pantoprazole    Tablet 40 milliGRAM(s) Oral every 12 hours  sodium bicarbonate 650 milliGRAM(s) Oral two times a day  vancomycin  IVPB 500 milliGRAM(s) IV Intermittent every 24 hours    PRN MEDICATIONS  acetaminophen   Tablet .. 650 milliGRAM(s) Oral every 6 hours PRN  dextrose 40% Gel 15 Gram(s) Oral once PRN  diazepam    Tablet 5 milliGRAM(s) Oral two times a day PRN  glucagon  Injectable 1 milliGRAM(s) IntraMuscular once PRN  oxyCODONE    IR 20 milliGRAM(s) Oral every 8 hours PRN    VITALS:   T(F): 98  HR: 77  BP: 122/71  RR: 17  SpO2: --    LABS:                        7.5    10.61 )-----------( 301      ( 03 Dec 2019 07:15 )             23.8     12-03    133<L>  |  95<L>  |  43<H>  ----------------------------<  88  4.0   |  24  |  3.8<H>    Ca    7.7<L>      03 Dec 2019 07:15  Phos  4.9     12-03    TPro  4.5<L>  /  Alb  1.5<L>  /  TBili  <0.2  /  DBili  x   /  AST  11  /  ALT  7   /  AlkPhos  151<H>  12-03    PT/INR - ( 02 Dec 2019 06:45 )   PT: 16.70 sec;   INR: 1.46 ratio         PTT - ( 02 Dec 2019 06:45 )  PTT:37.8 sec      PHYSICAL EXAM:  GENERAL: NAD, lying in bed comfortably  HEAD:  Atraumatic, Normocephalic  EYES: conjunctiva and sclera clear  ENT: Moist mucous membranes  CHEST/LUNG: Clear to auscultation bilaterally  HEART: Regular rate and rhythm; No murmurs  ABDOMEN: Bowel sounds present; Soft, Nontender, Nondistended. Suprapubic catheter noted, draining normal urine.  EXTREMITIES:  R sided BKA noted, new left sided BKA ( dressing with bloody discharge  NERVOUS SYSTEM:  Alert & Oriented X3, speech clear. No deficits

## 2019-12-03 NOTE — PROGRESS NOTE ADULT - SUBJECTIVE AND OBJECTIVE BOX
We are following the patient for Dysphagia     GI HPI Today:  Pt still complaining of Dysphagia. Patient and his wife they are worried about the EGD because of the patient infection and recent surgery and they prefer to hold on any intervention for now     PAST MEDICAL & SURGICAL HISTORY  Anemia  PAD (peripheral artery disease)  Hypertension  Suprapubic catheter  Pressure ulcer  Diabetes  Lumbar compression fracture  S/P below knee amputation, right  S/P debridement  Toe amputation status, right  H/O hernia repair      ALLERGIES:  sulfamethizole (Unknown)      MEDICATIONS:  MEDICATIONS  (STANDING):  ascorbic acid 500 milliGRAM(s) Oral daily  calcium acetate 667 milliGRAM(s) Oral three times a day with meals  calcium carbonate    500 mG (Tums) Chewable 1 Tablet(s) Chew every 8 hours  cefTRIAXone   IVPB 2000 milliGRAM(s) IV Intermittent every 24 hours  chlorhexidine 4% Liquid 1 Application(s) Topical <User Schedule>  cholecalciferol 4000 Unit(s) Oral daily  dextrose 50% Injectable 12.5 Gram(s) IV Push once  dextrose 50% Injectable 25 Gram(s) IV Push once  dextrose 50% Injectable 25 Gram(s) IV Push once  doxycycline hyclate Capsule 100 milliGRAM(s) Oral every 12 hours  ferrous    sulfate 325 milliGRAM(s) Oral two times a day  folic acid 1 milliGRAM(s) Oral daily  heparin  Injectable 5000 Unit(s) SubCutaneous every 12 hours  levothyroxine 100 MICROGram(s) Oral daily  methadone    Tablet 80 milliGRAM(s) Oral every 6 hours  oxybutynin 10 milliGRAM(s) Oral two times a day  pantoprazole    Tablet 40 milliGRAM(s) Oral every 12 hours  sodium bicarbonate 650 milliGRAM(s) Oral two times a day  vancomycin  IVPB 500 milliGRAM(s) IV Intermittent every 24 hours    MEDICATIONS  (PRN):  acetaminophen   Tablet .. 650 milliGRAM(s) Oral every 6 hours PRN Temp greater or equal to 38C (100.4F), Mild Pain (1 - 3)  dextrose 40% Gel 15 Gram(s) Oral once PRN Blood Glucose LESS THAN 70 milliGRAM(s)/deciLiter  diazepam    Tablet 5 milliGRAM(s) Oral two times a day PRN anxiety  glucagon  Injectable 1 milliGRAM(s) IntraMuscular once PRN Glucose <70 milliGRAM(s)/deciLiter  oxyCODONE    IR 20 milliGRAM(s) Oral every 8 hours PRN Severe Pain (7 - 10)      REVIEW OF SYSTEMS  General:  No fevers  Eyes:  No reported pain   ENT:  No sore throat   NECK: No stiffness   CV:  No chest pain   Resp:  No shortness of breath  GI:  See HPI  :  No dysuria  Muscle:  +++ weakness  Neuro:  No tingling  Endocrine:  No polyuria  Heme:  No ecchymosis        VITALS:   T(F): 98 (12-03 @ 13:05), Max: 98.3 (12-02 @ 01:02)  HR: 77 (12-03 @ 13:05) (69 - 81)  BP: 122/71 (12-03 @ 13:05) (101/68 - 137/65)  BP(mean): --  RR: 17 (12-03 @ 13:05) (16 - 22)  SpO2: 94% (12-02 @ 14:52) (94% - 100%)        PHYSICAL EXAM:  GENERAL:  Appears in no distress  HEENT:  Conjunctivae Anicteric   CHEST:  Full & symmetric excursion  HEART:  NS1, S2,   ABDOMEN:  Soft, non-tender, non-distended  NEURO:  Alert         Blood Work :                        7.5    10.61 )-----------( 301      ( 03 Dec 2019 07:15 )             23.8     PT/INR - ( 02 Dec 2019 06:45 )  INR: 1.46          PTT - ( 02 Dec 2019 06:45 )  PTT:37.8   12-03    133<L>  |  95<L>  |  43<H>  ----------------------------<  88  4.0   |  24  |  3.8<H>    Ca    7.7<L>      03 Dec 2019 07:15  Phos  4.9     12-03  Mg     1.9     11-30      CBC -  ( 03 Dec 2019 07:15 )  Hemoglobin : 7.5    CBC -  ( 02 Dec 2019 06:45 )  Hemoglobin : 7.5    CBC -  ( 01 Dec 2019 07:09 )  Hemoglobin : 7.6    CBC -  ( 30 Nov 2019 11:40 )  Hemoglobin : 7.6      LIVER FUNCTIONS - ( 03 Dec 2019 07:15 )  Alb: 1.5 [3.5 - 5.2] / Pro: 4.5 [6.0 - 8.0] / ALK PHOS: 151 [30 - 115] / ALT: 7 [0 - 41] / AST: 11 [0 - 41] / GGT: x     LIVER FUNCTIONS - ( 02 Dec 2019 06:45 )  Alb: 1.5 [3.5 - 5.2] / Pro: 4.6 [6.0 - 8.0] / ALK PHOS: 160 [30 - 115] / ALT: 11 [0 - 41] / AST: 12 [0 - 41] / GGT: x     LIVER FUNCTIONS - ( 01 Dec 2019 07:09 )  Alb: 1.4 [3.5 - 5.2] / Pro: 4.4 [6.0 - 8.0] / ALK PHOS: 156 [30 - 115] / ALT: 11 [0 - 41] / AST: 11 [0 - 41] / GGT: x     LIVER FUNCTIONS - ( 30 Nov 2019 11:40 )  Alb: 1.6 [3.5 - 5.2] / Pro: 4.5 [6.0 - 8.0] / ALK PHOS: 155 [30 - 115] / ALT: 11 [0 - 41] / AST: 12 [0 - 41] / GGT: x     LIVER FUNCTIONS - ( 29 Nov 2019 07:32 )  Alb: 1.6 [3.5 - 5.2] / Pro: 4.5 [6.0 - 8.0] / ALK PHOS: 146 [30 - 115] / ALT: 12 [0 - 41] / AST: 10 [0 - 41] / GGT: x     LIVER FUNCTIONS - ( 28 Nov 2019 06:29 )  Alb: 1.6 [3.5 - 5.2] / Pro: 4.8 [6.0 - 8.0] / ALK PHOS: 161 [30 - 115] / ALT: 14 [0 - 41] / AST: 14 [0 - 41] / GGT: x     LIVER FUNCTIONS - ( 27 Nov 2019 06:17 )  Alb: 1.7 [3.5 - 5.2] / Pro: 4.8 [6.0 - 8.0] / ALK PHOS: 163 [30 - 115] / ALT: 15 [0 - 41] / AST: 16 [0 - 41] / GGT: x     LIVER FUNCTIONS - ( 26 Nov 2019 18:30 )  Alb: 1.9 [3.5 - 5.2] / Pro: 5.6 [6.0 - 8.0] / ALK PHOS: 186 [30 - 115] / ALT: 19 [0 - 41] / AST: 18 [0 - 41] / GGT: x

## 2019-12-03 NOTE — PROGRESS NOTE ADULT - SUBJECTIVE AND OBJECTIVE BOX
Procedure: Status post left bka      Operative Findings:  · Operative Findings	infected wound of left foot	      Post Operative day #1  Patient is resting comfortably no complaints       pmh:  Anemia  PAD (peripheral artery disease)  Hypertension  Suprapubic catheter  Pressure ulcer  Diabetes  Lumbar compression fracture  Infected wound  Infected wound  Cold foot with peripheral vascular disease  Pressure ulcer  Deep incision of infected bone of foot  Guillotine amputation below knee  S/P below knee amputation, right  S/P debridement  Toe amputation status, right  H/O hernia repair  No significant past surgical history  CIRCULATION IN LEG/PAINFUL  36    sulfamethizole (Unknown)    acetaminophen   Tablet .. 650 milliGRAM(s) Oral every 6 hours PRN  ascorbic acid 500 milliGRAM(s) Oral daily  calcium acetate 667 milliGRAM(s) Oral three times a day with meals  calcium carbonate    500 mG (Tums) Chewable 1 Tablet(s) Chew every 8 hours  cefTRIAXone   IVPB 2000 milliGRAM(s) IV Intermittent every 24 hours  chlorhexidine 4% Liquid 1 Application(s) Topical <User Schedule>  cholecalciferol 4000 Unit(s) Oral daily  dextrose 40% Gel 15 Gram(s) Oral once PRN  dextrose 5% 1000 milliLiter(s) IV Continuous <Continuous>  dextrose 50% Injectable 12.5 Gram(s) IV Push once  dextrose 50% Injectable 25 Gram(s) IV Push once  dextrose 50% Injectable 25 Gram(s) IV Push once  diazepam    Tablet 5 milliGRAM(s) Oral two times a day PRN  doxycycline hyclate Capsule 100 milliGRAM(s) Oral every 12 hours  ferrous    sulfate 325 milliGRAM(s) Oral two times a day  folic acid 1 milliGRAM(s) Oral daily  glucagon  Injectable 1 milliGRAM(s) IntraMuscular once PRN  heparin  Injectable 5000 Unit(s) SubCutaneous every 12 hours  levothyroxine 100 MICROGram(s) Oral daily  methadone    Tablet 80 milliGRAM(s) Oral every 6 hours  oxybutynin 10 milliGRAM(s) Oral two times a day  oxyCODONE    IR 20 milliGRAM(s) Oral every 8 hours PRN  pantoprazole    Tablet 40 milliGRAM(s) Oral every 12 hours  vancomycin  IVPB 500 milliGRAM(s) IV Intermittent every 24 hours          T(C): 35.8 (12-02-19 @ 20:10), Max: 36.8 (12-02-19 @ 01:02)  HR: 69 (12-02-19 @ 20:10) (69 - 80)  BP: 112/62 (12-02-19 @ 20:10) (112/62 - 137/65)  RR: 18 (12-02-19 @ 20:10) (17 - 22)  SpO2: 94% (12-02-19 @ 14:52) (94% - 100%)    12-01-19 @ 07:01  -  12-02-19 @ 07:00  --------------------------------------------------------  IN: 0 mL / OUT: 1200 mL / NET: -1200 mL    12-02-19 @ 07:01  -  12-03-19 @ 00:31  --------------------------------------------------------  IN: 50 mL / OUT: 2700 mL / NET: -2650 mL        General:Alert and oriented times 3, not in acute distress   Heart: Regular rate and rhythm, no rubs , murmurs or gallops  Lungs: Clear to auscultation bilaterally, no wheezes, rales, rhonci appreciated  Abdomen: Soft , positive bowel sounds, no tenderness, no distention, no peritoneal signs   Exremites:left bka clean dry and intact ,  warm extremities,  good color, no swelling, motor and sensation , pulses                7.5    9.14  )-----------( 307      ( 12-02 @ 06:45 )             24.1                7.6    8.69  )-----------( 317      ( 12-01 @ 07:09 )             23.7                    133   |  95    |  43                 Ca: 7.6    BMP:   ----------------------------< 59     Mg: x     (12-02-19 @ 06:45)             4.0    |  24    | 4.0                Ph: 4.8      LFT:     TPro: 4.6 / Alb: 1.5 / TBili: <0.2 / DBili: x / AST: 12 / ALT: 11 / AlkPhos: 160   (12-02-19 @ 06:45)          PT/INR - ( 02 Dec 2019 06:45 )   PT: 16.70 sec;   INR: 1.46 ratio         PTT - ( 02 Dec 2019 06:45 )  PTT:37.8 sec                               Plan and assesment:  Pt. 59yMale status post left bka     -stump checks  -monitor hemoglobin  -Pain management  - Diet  -Vascular to follow call as needed     GARTH Mann   12-03-19 @ 00:31  # [id number]/ [id number]

## 2019-12-04 NOTE — PROGRESS NOTE ADULT - ASSESSMENT
59 year old male s/p left BKA 12/2    Plan:   - pre-op for closure 12/5 - cbc, bmp, mg, phos, type and screen, coag, npo after midnight

## 2019-12-04 NOTE — PROGRESS NOTE ADULT - SUBJECTIVE AND OBJECTIVE BOX
BELLA MARGE  59y, Male    All available historical data reviewed    OVERNIGHT EVENTS:  no fevers, no pain at surgical site    ROS:  General: Denies rigors, night sweats  HEENT: Denies headache, rhinorrhea, sore throat, eye pain  CV: Denies CP, palpitations  PULM: Denies wheezing, hemoptysis  GI: Denies hematemesis, hematochezia, melena  : Denies discharge, hematuria  MSK: Denies arthralgias, myalgias  SKIN: Denies rash, lesions  NEURO: Denies paresthesias, weakness  PSYCH: Denies depression, anxiety    VITALS:  T(F): 96.8, Max: 98 (12-03-19 @ 13:05)  HR: 70  BP: 128/65  RR: 18Vital Signs Last 24 Hrs  T(C): 36 (04 Dec 2019 05:26), Max: 36.7 (03 Dec 2019 13:05)  T(F): 96.8 (04 Dec 2019 05:26), Max: 98 (03 Dec 2019 13:05)  HR: 70 (04 Dec 2019 05:26) (70 - 77)  BP: 128/65 (04 Dec 2019 05:26) (114/62 - 128/65)  BP(mean): --  RR: 18 (04 Dec 2019 05:26) (17 - 18)  SpO2: --    TESTS & MEASUREMENTS:                        7.9    8.02  )-----------( 286      ( 04 Dec 2019 06:19 )             25.8     12-04    136  |  97<L>  |  47<H>  ----------------------------<  53<L>  4.5   |  26  |  4.0<H>    Ca    8.2<L>      04 Dec 2019 06:19  Phos  5.2     12-04  Mg     1.5     12-04    TPro  4.6<L>  /  Alb  1.4<L>  /  TBili  <0.2  /  DBili  x   /  AST  14  /  ALT  7   /  AlkPhos  160<H>  12-04    LIVER FUNCTIONS - ( 04 Dec 2019 06:19 )  Alb: 1.4 g/dL / Pro: 4.6 g/dL / ALK PHOS: 160 U/L / ALT: 7 U/L / AST: 14 U/L / GGT: x                   RADIOLOGY & ADDITIONAL TESTS:  Personal review of radiological diagnostics performed  Echo and EKG results noted when applicable.     ANTIBIOTICS:  cefTRIAXone   IVPB 2000 milliGRAM(s) IV Intermittent every 24 hours  metroNIDAZOLE  IVPB 500 milliGRAM(s) IV Intermittent every 8 hours  vancomycin  IVPB

## 2019-12-04 NOTE — PROGRESS NOTE ADULT - SUBJECTIVE AND OBJECTIVE BOX
Nephrology progress note    THIS IS AN INCOMPLETE NOTE . FULL NOTE TO FOLLOW SHORTLY    Patient is seen and examined, events over the last 24 h noted .    Allergies:  sulfamethizole (Unknown)    Hospital Medications:   MEDICATIONS  (STANDING):  ascorbic acid 500 milliGRAM(s) Oral daily  calcium acetate 667 milliGRAM(s) Oral three times a day with meals  calcium carbonate    500 mG (Tums) Chewable 1 Tablet(s) Chew every 8 hours  cefTRIAXone   IVPB 2000 milliGRAM(s) IV Intermittent every 24 hours  chlorhexidine 4% Liquid 1 Application(s) Topical <User Schedule>  cholecalciferol 4000 Unit(s) Oral daily  dextrose 50% Injectable 12.5 Gram(s) IV Push once  dextrose 50% Injectable 25 Gram(s) IV Push once  dextrose 50% Injectable 25 Gram(s) IV Push once  ferrous    sulfate 325 milliGRAM(s) Oral two times a day  folic acid 1 milliGRAM(s) Oral daily  heparin  Injectable 5000 Unit(s) SubCutaneous every 12 hours  levothyroxine 100 MICROGram(s) Oral daily  methadone    Tablet 80 milliGRAM(s) Oral every 6 hours  metroNIDAZOLE  IVPB 500 milliGRAM(s) IV Intermittent every 8 hours  oxybutynin 10 milliGRAM(s) Oral two times a day  pantoprazole    Tablet 40 milliGRAM(s) Oral every 12 hours  sodium bicarbonate 650 milliGRAM(s) Oral two times a day  sodium chloride 0.9%. 1000 milliLiter(s) (100 mL/Hr) IV Continuous <Continuous>  vancomycin  IVPB            VITALS:  T(F): 96.8 (19 @ 05:26), Max: 98 (19 @ 13:05)  HR: 70 (19 @ 05:26)  BP: 128/65 (19 @ 05:26)  RR: 18 (19 @ 05:26)  SpO2: --  Wt(kg): --     @ 07:01  -   @ 07:00  --------------------------------------------------------  IN: 50 mL / OUT: 3000 mL / NET: -2950 mL    12-03 @ 07:01  -   @ 07:00  --------------------------------------------------------  IN: 450 mL / OUT: 4900 mL / NET: -4450 mL          PHYSICAL EXAM:  Constitutional: NAD  HEENT: anicteric sclera, oropharynx clear, MMM  Neck: No JVD  Respiratory: CTAB, no wheezes, rales or rhonchi  Cardiovascular: S1, S2, RRR  Gastrointestinal: BS+, soft, NT/ND  Extremities: No cyanosis or clubbing. No peripheral edema  :  No brown.   Skin: No rashes    LABS:      136  |  97<L>  |  47<H>  ----------------------------<  53<L>  4.5   |  26  |  4.0<H>    Ca    8.2<L>      04 Dec 2019 06:19  Phos  5.2       Mg     1.5         TPro  4.6<L>  /  Alb  1.4<L>  /  TBili  <0.2  /  DBili      /  AST  14  /  ALT  7   /  AlkPhos  160<H>                            7.9    8.02  )-----------( 286      ( 04 Dec 2019 06:19 )             25.8       Urine Studies:  Urinalysis Basic - ( 2019 11:58 )    Color: Light Yellow / Appearance: Slightly Turbid / S.007 / pH:   Gluc:  / Ketone: Negative  / Bili: Negative / Urobili: <2 mg/dL   Blood:  / Protein: 300 mg/dL / Nitrite: Positive   Leuk Esterase: Large / RBC: 1 /HPF / WBC 59 /HPF   Sq Epi:  / Non Sq Epi: 1 /HPF / Bacteria: Many      Sodium, Random Urine: 31.0 mmoL/L ( @ 11:58)  Creatinine, Random Urine: 17 mg/dL ( @ 11:58)  Potassium, Random Urine: 9 mmol/L ( @ 11:58)    RADIOLOGY & ADDITIONAL STUDIES: Nephrology progress note  Patient is seen and examined, events over the last 24 h noted .  sp BKA  lying in bed comfortable     Allergies:  sulfamethizole (Unknown)    Hospital Medications:   MEDICATIONS  (STANDING):  ascorbic acid 500 milliGRAM(s) Oral daily  calcium acetate 667 milliGRAM(s) Oral three times a day with meals  calcium carbonate    500 mG (Tums) Chewable 1 Tablet(s) Chew every 8 hours  cefTRIAXone   IVPB 2000 milliGRAM(s) IV Intermittent every 24 hours  cholecalciferol 4000 Unit(s) Oral daily  ferrous    sulfate 325 milliGRAM(s) Oral two times a day  folic acid 1 milliGRAM(s) Oral daily  heparin  Injectable 5000 Unit(s) SubCutaneous every 12 hours  levothyroxine 100 MICROGram(s) Oral daily  methadone    Tablet 80 milliGRAM(s) Oral every 6 hours  metroNIDAZOLE  IVPB 500 milliGRAM(s) IV Intermittent every 8 hours  oxybutynin 10 milliGRAM(s) Oral two times a day  pantoprazole    Tablet 40 milliGRAM(s) Oral every 12 hours  sodium bicarbonate 650 milliGRAM(s) Oral two times a day  sodium chloride 0.9%. 1000 milliLiter(s) (100 mL/Hr) IV Continuous <Continuous>  vancomycin  IVPB            VITALS:  T(F): 96.8 (19 @ 05:26), Max: 98 (19 @ 13:05)  HR: 70 (19 @ 05:26)  BP: 128/65 (19 @ 05:26)  RR: 18 (19 @ 05:26)       @ 07:  -   @ 07:00  --------------------------------------------------------  IN: 50 mL / OUT: 3000 mL / NET: -2950 mL     @ 07:01  -   @ 07:00  --------------------------------------------------------  IN: 450 mL / OUT: 4900 mL / NET: -4450 mL          PHYSICAL EXAM:  Constitutional: NAD  HEENT: anicteric sclera, oropharynx clear, MMM  Neck: No JVD  Respiratory: CTAB, no wheezes, rales or rhonchi  Cardiovascular: S1, S2, RRR  Gastrointestinal: BS+, soft, NT/ND  Extremities: No cyanosis or clubbing. No peripheral edema/ bilateral BKA   :  No brown.   Skin: No rashes    LABS:      136  |  97<L>  |  47<H>  ----------------------------<  53<L>  4.5   |  26  |  4.0<H>    Ca    8.2<L>      04 Dec 2019 06:19  Phos  5.2       Mg     1.5         TPro  4.6<L>  /  Alb  1.4<L>  /  TBili  <0.2  /  DBili      /  AST  14  /  ALT  7   /  AlkPhos  160<H>                            7.9    8.02  )-----------( 286      ( 04 Dec 2019 06:19 )             25.8       Urine Studies:  Urinalysis Basic - ( 2019 11:58 )    Color: Light Yellow / Appearance: Slightly Turbid / S.007 / pH:   Gluc:  / Ketone: Negative  / Bili: Negative / Urobili: <2 mg/dL   Blood:  / Protein: 300 mg/dL / Nitrite: Positive   Leuk Esterase: Large / RBC: 1 /HPF / WBC 59 /HPF   Sq Epi:  / Non Sq Epi: 1 /HPF / Bacteria: Many      Sodium, Random Urine: 31.0 mmoL/L ( @ 11:58)  Creatinine, Random Urine: 17 mg/dL ( @ 11:58)  Potassium, Random Urine: 9 mmol/L ( @ 11:58)    RADIOLOGY & ADDITIONAL STUDIES:

## 2019-12-04 NOTE — PROGRESS NOTE ADULT - ATTENDING COMMENTS
Plan for BKA closure today but the patient was hypoglycemic (BS 50) and very lethargic.  Will reschedule for next week once medically optimized

## 2019-12-04 NOTE — PROGRESS NOTE ADULT - SUBJECTIVE AND OBJECTIVE BOX
GENERAL SURGERY PROGRESS NOTE     MARGE BELLA  59y  Male  Hospital day :8d  POD:  Procedure: Deep incision of infected bone of foot  Guillotine amputation below knee    OVERNIGHT EVENTS: Dressing changed 12/3, wound clean and not actively bleeding.     T(F): 97.5 (12-03-19 @ 20:20), Max: 98 (12-03-19 @ 13:05)  HR: 70 (12-03-19 @ 20:20) (70 - 77)  BP: 114/62 (12-03-19 @ 20:20) (114/62 - 122/71)  ABP: --  ABP(mean): --  RR: 18 (12-03-19 @ 20:20) (17 - 18)  SpO2: --    DIET/FLUIDS: ascorbic acid 500 milliGRAM(s) Oral daily  calcium acetate 667 milliGRAM(s) Oral three times a day with meals  cholecalciferol 4000 Unit(s) Oral daily  ferrous    sulfate 325 milliGRAM(s) Oral two times a day  folic acid 1 milliGRAM(s) Oral daily  sodium bicarbonate 650 milliGRAM(s) Oral two times a day     GI proph:  pantoprazole    Tablet 40 milliGRAM(s) Oral every 12 hours    AC/ proph: heparin  Injectable 5000 Unit(s) SubCutaneous every 12 hours    ABx: cefTRIAXone   IVPB 2000 milliGRAM(s) IV Intermittent every 24 hours  metroNIDAZOLE  IVPB 500 milliGRAM(s) IV Intermittent every 8 hours  vancomycin  IVPB          PHYSICAL EXAM:  GENERAL: NAD  CHEST/LUNG: Clear to auscultation bilaterally  HEART: Regular rate and rhythm  ABDOMEN: Soft, Nontender, Nondistended;   EXTREMITIES:  b/l BKA      LABS  Labs:  CAPILLARY BLOOD GLUCOSE      POCT Blood Glucose.: 65 mg/dL (03 Dec 2019 20:34)  POCT Blood Glucose.: 130 mg/dL (03 Dec 2019 16:35)  POCT Blood Glucose.: 81 mg/dL (03 Dec 2019 12:25)  POCT Blood Glucose.: 89 mg/dL (03 Dec 2019 07:29)                          7.5    10.61 )-----------( 301      ( 03 Dec 2019 07:15 )             23.8       Auto Neutrophil %: 76.8 % (12-03-19 @ 07:15)  Auto Immature Granulocyte %: 0.5 % (12-03-19 @ 07:15)    12-03    133<L>  |  95<L>  |  43<H>  ----------------------------<  88  4.0   |  24  |  3.8<H>      Calcium, Total Serum: 7.7 mg/dL (12-03-19 @ 07:15)      LFTs:             4.5  | <0.2 | 11       ------------------[151     ( 03 Dec 2019 07:15 )  1.5  | x    | 7           Lipase:x      Amylase:x           ABG - ( 28 Nov 2019 14:45 )  pH: 7.34  /  pCO2: 38    /  pO2: 133   / HCO3: 20    / Base Excess: -4.9  /  SaO2: 99              ABG - ( 28 Nov 2019 00:59 )  pH: 7.27  /  pCO2: 40    /  pO2: 36    / HCO3: 18    / Base Excess: -8.1  /  SaO2: 69                Coags:     16.70  ----< 1.46    ( 02 Dec 2019 06:45 )     37.8              RADIOLOGY & ADDITIONAL TESTS: no studies since 12/1

## 2019-12-04 NOTE — PROGRESS NOTE ADULT - ASSESSMENT
· Assessment		  Mr. Booker is a 58 yo male patient with Pmhx of PVD s/p R AKA, paraplegia with multiple chronic bedsores and suprapubic catheter, HTN (not on any medication), CKD stage IV (not following with nephrology), hypothyroidism, opioid dependance, recent admission due to unresponsiveness, presents for 3-4 days of left foot coldness, pain and worsening ulcers.  Patient went today to see Dr. Gooden who sent patient to ED. He noted that he has had left foot ulcers for the last 5 years, course was waxing and waning and was stable with dressing. Lately he felt his wound is getting worse and his foot felt cold. Denies any fever, chills. Patient denying any cp or sob. No GI or  complaints.  In ED, patient afebrile, tachycardic hypertensive. He was given 2L of LR, and one dose of vancomycin. Patient was seen by vascular surgery and arterial doppler US of the right LE was done.     IMPRESSION:  # Severe PAD with ischemic LLE  -s/p L BKA 12/2. Closure possibly 12/5  -s/p Right BKA  -WBC 8.0  # Chronic sacral OM with cellulitis  -vancomycin level 8.1. Dosis changed to 750 mg iv q24h      RECOMMENDATIONS:  f/u with burn  Vancomycin 7500 mg iv q24h  Check trough   Rocephin 2 gm iv q24h  Flagyl 500 mg iv q8h  Off loading

## 2019-12-04 NOTE — PROGRESS NOTE ADULT - SUBJECTIVE AND OBJECTIVE BOX
SUBJECTIVE:    Today is hospital day 8d. Continues to have issues with swallowing, does not like hospital food. Patient for closure in am with Vascular and with GI for endoscopy.     PAST MEDICAL & SURGICAL HISTORY  Anemia  PAD (peripheral artery disease)  Hypertension  Suprapubic catheter  Pressure ulcer  Diabetes  Lumbar compression fracture  S/P below knee amputation, right  S/P debridement  Toe amputation status, right  H/O hernia repair    ALLERGIES:  sulfamethizole (Unknown)    MEDICATIONS:  STANDING MEDICATIONS  ascorbic acid 500 milliGRAM(s) Oral daily  calcium acetate 667 milliGRAM(s) Oral three times a day with meals  calcium carbonate    500 mG (Tums) Chewable 1 Tablet(s) Chew every 8 hours  cefTRIAXone   IVPB 2000 milliGRAM(s) IV Intermittent every 24 hours  chlorhexidine 4% Liquid 1 Application(s) Topical <User Schedule>  cholecalciferol 4000 Unit(s) Oral daily  dextrose 50% Injectable 12.5 Gram(s) IV Push once  dextrose 50% Injectable 25 Gram(s) IV Push once  dextrose 50% Injectable 25 Gram(s) IV Push once  ferrous    sulfate 325 milliGRAM(s) Oral two times a day  folic acid 1 milliGRAM(s) Oral daily  heparin  Injectable 5000 Unit(s) SubCutaneous every 12 hours  levothyroxine 100 MICROGram(s) Oral daily  methadone    Tablet 80 milliGRAM(s) Oral every 6 hours  metroNIDAZOLE  IVPB 500 milliGRAM(s) IV Intermittent every 8 hours  oxybutynin 10 milliGRAM(s) Oral two times a day  pantoprazole    Tablet 40 milliGRAM(s) Oral every 12 hours  sodium bicarbonate 650 milliGRAM(s) Oral two times a day  sodium chloride 0.9%. 1000 milliLiter(s) IV Continuous <Continuous>  vancomycin  IVPB        PRN MEDICATIONS  acetaminophen   Tablet .. 650 milliGRAM(s) Oral every 6 hours PRN  dextrose 40% Gel 15 Gram(s) Oral once PRN  diazepam    Tablet 5 milliGRAM(s) Oral two times a day PRN  glucagon  Injectable 1 milliGRAM(s) IntraMuscular once PRN  oxyCODONE    IR 20 milliGRAM(s) Oral every 8 hours PRN    VITALS:   T(F): 96.8  HR: 70  BP: 128/65  RR: 18  SpO2: --    LABS:                        7.9    8.02  )-----------( 286      ( 04 Dec 2019 06:19 )             25.8     12-04    136  |  97<L>  |  47<H>  ----------------------------<  53<L>  4.5   |  26  |  4.0<H>    Ca    8.2<L>      04 Dec 2019 06:19  Phos  5.2     12-04  Mg     1.5     12-04    TPro  4.6<L>  /  Alb  1.4<L>  /  TBili  <0.2  /  DBili  x   /  AST  14  /  ALT  7   /  AlkPhos  160<H>  12-04    PT/INR - ( 04 Dec 2019 06:19 )   PT: 17.10 sec;   INR: 1.49 ratio         PTT - ( 04 Dec 2019 06:19 )  PTT:38.5 sec        PHYSICAL EXAM:  GENERAL: NAD, lying in bed comfortably  HEAD:  Atraumatic, Normocephalic  EYES: conjunctiva and sclera clear  ENT: Moist mucous membranes  CHEST/LUNG: Clear to auscultation bilaterally  HEART: Regular rate and rhythm; No murmurs  ABDOMEN: Bowel sounds present; Soft, Nontender, Nondistended. Suprapubic catheter noted, draining normal urine.  EXTREMITIES:  R sided BKA noted, new left sided BKA ( dressing with bloody discharge)  NERVOUS SYSTEM:  Alert & Oriented X3, speech clear. No deficits

## 2019-12-04 NOTE — PROGRESS NOTE ADULT - ASSESSMENT
Mr. Booker is a 60 yo male patient with PMHx of PVD s/p R AKA, paraplegia with multiple chronic bedsores and suprapubic catheter, HTN (not on any medication), CKD stage IV (not following with nephrology), hypothyroidism, opioid dependance, recent admission due to unresponsiveness, presents for 3-4 days of left foot coldness, pain and worsening ulcers.    # Chronic left foot peripheral artery disease + dry gangrene  - Patient with chronic wound of left foot with gangrenous changes.   - Doppler US showed Moderate disease in the left SFA, No flow noted in the left posterior tibial artery   - Patient was not on any antiplatelets or statin.   - S/p BKA on 12/2/2019, schedule fro closure on 12/5/2019  - Echo results 55-60% , grade 1 pritesh dysfunction  - will keep on maintenance fluids, please hold off if O2 sat <92 percent    # Hypoglycemia  -w d/c Dextrose infusion, pt. is eating now, continue close bsfs monitoring  -As per attending if still remains hypoglycemic obtain serum insulin levels, C-peptite, beta-hydroxybutyrate, sulfonylurea, serum proinsulin  -poor po intake prior to OR and left BKA  -normal serum cortisol levels    # Poor PO intake, difficulty swallowing  - Patient with occasional difficulty swallowing at home  - dysphagia likely esophageal  - Speech and Swallow: need to follow up with GI  - pt not a candidate for Barium esophagram per Radiology  - GI: for possible Endoscopy on 12/6/19    # Hypothyroidism  - will need to re-check thyroid in 2 weeks ( week of 12/15)  - antibody work up sent: - thyroglobuln and thyroidperoxidase negative thus far  - Thyroid ultrasounds: negative  - levothyroxine to 100 mcg daily    -# Sacral OM and gangrene  - (tachycardia + leukocytosis) on admission  - made  Valium PRN due to low bp  - vancomycin 500 mg iv q24h  - Check trough before the 2nd dose tonight  - Rocephin 2 gm iv q24h  - Flagyl 500 mg iv q8h    # SILVINA on CKD + HAGMA  - non- oliguric  - Patient agreed to have temporary dialysis during inpatient stay only, or following with nephrologist.   - Patient with serum creatinin 4  - Patient now agreesing to dialysis  - Consider LINDY and Epogen  - on sodium bicarb oral 650 mg bid  - can increase if Anion gap increases  - f/u Phos, f/u Mag, f/u bmp    # Normocytic anemia  - Iron deficiency and CKD,  multifactorial  - s/p 1 unit pRBC on 11/27  - Patient denies any melena, or hematochezia, noted history of hemorrhoids  - Will trend CBC    # HTN  - not on any medication  - off Amlodipine    # Opioid dependance  - Patient on methadone 80 mg QID +  - Start Oxycodone IR 20mg PO Q4hrs PRN ( will reduce to Q8hrs as per attending)   - s/p Narcan on 11/28  - high risk for overdose given ckd  - f/u pain management recs as per Dr. Jackson    # Folate acid deficiency  - will start folic acid po qd    # Vitamin D def  - 4000 qd daily    # Chronic sacral bedsore.  - Followed by Dr. Gooden Mr. Booker is a 58 yo male patient with PMHx of PVD s/p R AKA, paraplegia with multiple chronic bedsores and suprapubic catheter, HTN (not on any medication), CKD stage IV (not following with nephrology), hypothyroidism, opioid dependance, recent admission due to unresponsiveness, presents for 3-4 days of left foot coldness, pain and worsening ulcers.    # Chronic left foot peripheral artery disease + dry gangrene  - Patient with chronic wound of left foot with gangrenous changes.   - Doppler US showed Moderate disease in the left SFA, No flow noted in the left posterior tibial artery   - Patient was not on any antiplatelets or statin.   - S/p BKA on 12/2/2019, schedule for closure on 12/5/2019, pre-op labs and 2 unit pRBC as per Vascular  - Echo results 55-60% , grade 1 pritesh dysfunction  - will keep on maintenance fluids, please hold off if O2 sat <92 percent    # Hypoglycemia  -w d/c Dextrose infusion, pt. is eating now, continue close bsfs monitoring  -As per attending if still remains hypoglycemic obtain serum insulin levels, C-peptite, beta-hydroxybutyrate, sulfonylurea, serum proinsulin  -poor po intake prior to OR and left BKA  -normal serum cortisol levels    # Poor PO intake, difficulty swallowing  - Patient with occasional difficulty swallowing at home  - dysphagia likely esophageal  - Speech and Swallow: need to follow up with GI  - pt not a candidate for Barium esophagram per Radiology  - GI: for possible Endoscopy on 12/6/19    # Hypothyroidism  - will need to re-check thyroid in 2 weeks ( week of 12/15)  - antibody work up sent: - thyroglobuln and thyroidperoxidase negative thus far  - Thyroid ultrasounds: negative  - levothyroxine to 100 mcg daily    -# Sacral OM and gangrene  - (tachycardia + leukocytosis) on admission  - made  Valium PRN due to low bp  - vancomycin 500 mg iv q24h  - Check trough before the 2nd dose tonight  - Rocephin 2 gm iv q24h  - Flagyl 500 mg iv q8h    # SILVINA on CKD + HAGMA  - non- oliguric  - Patient agreed to have temporary dialysis during inpatient stay only, or following with nephrologist.   - Patient with serum creatinin 4  - Patient now agreesing to dialysis  - Consider LINDY and Epogen  - on sodium bicarb oral 650 mg bid  - can increase if Anion gap increases  - f/u Phos, f/u Mag, f/u bmp    # Normocytic anemia  - Iron deficiency and CKD,  multifactorial  - s/p 1 unit pRBC on 11/27  - Patient denies any melena, or hematochezia, noted history of hemorrhoids  - Will trend CBC    # HTN  - not on any medication  - off Amlodipine    # Opioid dependance  - Patient on methadone 80 mg QID +  - Start Oxycodone IR 20mg PO Q4hrs PRN ( will reduce to Q8hrs as per attending)   - s/p Narcan on 11/28  - high risk for overdose given ckd  - f/u pain management recs as per Dr. Jackson    # Folate acid deficiency  - will start folic acid po qd    # Vitamin D def  - 4000 qd daily    # Chronic sacral bedsore.  - Followed by Dr. Gooden

## 2019-12-04 NOTE — PROGRESS NOTE ADULT - ASSESSMENT
59/M with SILVINA on CKD 4 (baseline creat 3.8 mg% Oct 2019) SPC, severe PVD, presented with cold Lt LE and ulcers, found to have severe anemia Hb 6.8 and acidosis.    SILVINA on CKD 4  prerenal vs ATN (ischemic) severe hypotension  # non oliguric  # creatinine stable   # last ph at goal, on binders, PTH noted, no need for 1-25 vit D   # h/h noted, no need for venofer sat in the 30 range, will start LINDY once off ATB , might need blood tx   #ID notes appreciated on vanco rocephin and flagyl   # patient with significant decrease in GFR in view of low muscle mass ( amputations)  # bicarb noted keep same dose of sodium bicarb for now / may decrease dose if Bicarb>27  # patient is refusing hd , no acute indication for it   #will follow

## 2019-12-04 NOTE — PROGRESS NOTE ADULT - ATTENDING COMMENTS
58 yo M PMHx of PVD s/p Right AKA, paraplegia with multiple chronic bedsores and chronic  suprapubic catheter, HTN (not on any medication), CKD stage IV (not following with nephrology), hypothyroidism, opioid dependance, recent admission due to unresponsiveness, presents for 3-4 days of left foot coldness, pain and worsening ulcers. Pt found to have gangrene and is s/p amputation    Chronic left foot peripheral artery disease + dry gangrene  - Patient with chronic wound of left foot with gangrenous changes.   - Doppler US showed Moderate disease in the left SFA, No flow noted in the left posterior tibial artery   - Patient was not on any antiplatelets or statin.   - Evaluated by vascular surgery, s/p left BKA   - Post op care as per Surgical team recommendations.  - ID consult appreciated Vancomycin 7500 mg iv q24h, Check trough, Rocephin 2 gm iv q24h, Flagyl 500 mg iv q8h  - Off loading  - pt scheduled for wound closer in AM    Hypoglycemia  - FS 65 this AM  - repeat as per nursing staff normal  - check serum insulin levels, C-peptite, beta-hydroxybutyrate, sulfonylurea, serum proinsulin    Dysphagia  - has poor PO intake due to difficult swallowing  - worse with eating pepper steak  - EGD scheduled for 12/6    Hypotension  - resolved    Anemia due to Iron deficiency and chronic disease  - s/p 1 unit pRBC 11/27/19    - h/h remains low stable, started on PO iron tx.  - folate- borderline low and vitamin B 12 level- normal  - received 1U PRBC preoperatively    SILVINA on CKD stage 4 + HAGMA  -avoid nephrotoxic agents  - nephrology consult appreciated  - likely prerenal vs ATN from severe hypotension  - as per nephro: no need for venofer as iron saturation in the 30 range, will start LINDY once off ATB   - c/w current sodium bicarb    HTN  - now normotensive  -continue to monitor VS    Opioid dependance  - Patient on methadone 80 mg QID + Oxycontin 60 mg QID  - as per pain management consult- recommended to decrease Oxycodone dose to 20 mg  every 4 hours as needed for breakthrough pain  - can increase dose from TID back to QID prn  - can change tylenol to standing as per pain mgmt    Chronic sacral bedsore.  - Followed by Dr. Gooden    Hypothyroidism  - severe uncontrolled  - patient was on levothyroxine 50 mcg daily, increased to 100 MCG po once daily , will need to repeat thyroid profile in 2 weeks.  - PTO abx, thyroglobulin level, - unremarkable  US thyroid- no nodules      Severe Vitamin D deficiency- on vitamin D tx.    Progress Note Handoff:  Pending (specify):  surgery, EGD  Family discussion: discussed pain control, surgery, dysphagia with pt and family at bedside  Disposition: Home___/SNF___/Other________/Unknown at this time__x______ 60 yo M PMHx of PVD s/p Right AKA, paraplegia with multiple chronic bedsores and chronic  suprapubic catheter, HTN (not on any medication), CKD stage IV (not following with nephrology), hypothyroidism, opioid dependance, recent admission due to unresponsiveness, presents for 3-4 days of left foot coldness, pain and worsening ulcers. Pt found to have gangrene and is s/p amputation    Chronic left foot peripheral artery disease + dry gangrene  - Patient with chronic wound of left foot with gangrenous changes.   - Doppler US showed Moderate disease in the left SFA, No flow noted in the left posterior tibial artery   - Patient was not on any antiplatelets or statin.   - Evaluated by vascular surgery, s/p left BKA   - Post op care as per Surgical team recommendations.  - ID consult appreciated Vancomycin 7500 mg iv q24h, Check trough, Rocephin 2 gm iv q24h, Flagyl 500 mg iv q8h  - Off loading  - pt scheduled for wound closer in AM    Hypoglycemia  - FS 65 this AM  - repeat as per nursing staff normal  - check serum insulin levels, C-peptite, beta-hydroxybutyrate, sulfonylurea, serum proinsulin    Dysphagia  - has poor PO intake due to difficult swallowing  - worse with eating pepper steak  - EGD scheduled for 12/6    Hypotension  - resolved    Anemia due to Iron deficiency and chronic disease  - s/p 1 unit pRBC 11/27/19    - h/h remains low stable, started on PO iron tx.  - folate- borderline low and vitamin B 12 level- normal  - received 1U PRBC preoperatively    SILVINA on CKD stage 4 + HAGMA  -avoid nephrotoxic agents  - nephrology consult appreciated  - likely prerenal vs ATN from severe hypotension  - as per nephro: no need for venofer as iron saturation in the 30 range, will start LINDY once off ATB   - c/w current sodium bicarb    HTN  - now normotensive  -continue to monitor VS    Opioid dependance  - Patient on methadone 80 mg QID + Oxycontin 60 mg QID  - as per pain management consult- recommended to decrease Oxycodone dose to 20 mg  every 4 hours as needed for breakthrough pain  - can increase dose from TID back to QID prn  - can change tylenol to standing as per pain mgmt    Chronic sacral bedsore.  - Followed by Dr. Gooden    Hypothyroidism  - severe uncontrolled  - patient was on levothyroxine 50 mcg daily, increased to 100 MCG po once daily , will need to repeat thyroid profile in 2 weeks.  - PTO abx, thyroglobulin level, - unremarkable  US thyroid- no nodules      Severe Vitamin D deficiency- on vitamin D tx.    Hypomagnesmia  - repleted  - follow up AM level    Progress Note Handoff:  Pending (specify):  surgery, EGD  Family discussion: discussed pain control, surgery, dysphagia with pt and family at bedside  Disposition: Home___/SNF___/Other________/Unknown at this time__x______

## 2019-12-05 NOTE — PROGRESS NOTE ADULT - ATTENDING COMMENTS
58 yo M PMHx of PVD s/p Right AKA, paraplegia with multiple chronic bedsores and chronic  suprapubic catheter, HTN (not on any medication), CKD stage IV (not following with nephrology), hypothyroidism, opioid dependance, recent admission due to unresponsiveness, presents for 3-4 days of left foot coldness, pain and worsening ulcers. Pt found to have gangrene and is s/p amputation    Chronic left foot peripheral artery disease + dry gangrene  - Patient with chronic wound of left foot with gangrenous changes.   - Doppler US showed Moderate disease in the left SFA, No flow noted in the left posterior tibial artery   - Patient was not on any antiplatelets or statin.   - Evaluated by vascular surgery, s/p left BKA   - Post op care as per Surgical team recommendations.  - ID consult appreciated Vancomycin 7500 mg iv q24h, Check trough, Rocephin 2 gm iv q24h, Flagyl 500 mg iv q8h  - Off loading  - pt still pending wound closure.    Hypoglycemia  - FS 65 this AM  - repeat as per nursing staff normal  - check serum insulin levels, C-peptite, beta-hydroxybutyrate, sulfonylurea, serum proinsulin  Endocrine consult.  Possible differentials: ongoing infection vs poor appetite vs high dose pain meds vs insulinoma??       Dysphagia  - has poor PO intake due to difficult swallowing  - worse with eating pepper steak  - EGD scheduled for 12/6  When making patient NPO please switch from NS @ 75 to D5NS @ 75.    Hypotension  - resolved    Anemia due to Iron deficiency and chronic disease  - s/p 1 unit pRBC 11/27/19    - h/h remains low stable, started on PO iron tx.  - folate- borderline low and vitamin B 12 level- normal  - received 1U PRBC preoperatively    SILVINA on CKD stage 4 + HAGMA  -avoid nephrotoxic agents  - nephrology consult appreciated  - likely prerenal vs ATN from severe hypotension  - as per nephro: no need for venofer as iron saturation in the 30 range, will start LINDY once off ATB   - c/w current sodium bicarb    HTN  - now normotensive  -continue to monitor VS    Opioid dependance  - Patient on methadone 80 mg QID + Oxycontin 60 mg QID  - as per pain management consult- recommended to decrease Oxycodone dose to 20 mg  every 4 hours as needed for breakthrough pain  - can increase dose from TID back to QID prn  - can change tylenol to standing as per pain mgmt    Chronic sacral bedsore.  - Followed by Dr. Gooden    Hypothyroidism  - severe uncontrolled  - patient was on levothyroxine 50 mcg daily, increased to 100 MCG po once daily , will need to repeat thyroid profile in 2 weeks.  - PTO abx, thyroglobulin level, - unremarkable  US thyroid- no nodules      Severe Vitamin D deficiency- on vitamin D tx.    Hypomagnesmia  - repleted  - follow up AM level    Progress Note Handoff:  Pending (specify):  surgery, EGD  Family discussion: discussed pain control, surgery, dysphagia with pt and family at bedside  Disposition: Home___/SNF___/Other________/Unknown at this time__x______ .

## 2019-12-05 NOTE — CHART NOTE - NSCHARTNOTEFT_GEN_A_CORE
Called by nursing staff due to the patient having an episode of non-bilious vomiting. I went to examine the patient who was angry and upset. Mother at bedside anxious and both of them were giving me a difficult time to examine the patient and ask questions to make proper assessment of the current medical condition. The patient noted that he has diffuse abdominal pain for a long time which he usually gets at the end of the day due to taking his pain medications on an empty stomach. However, he noted that this type of pain is new. He notes that he usually gets nauseous in the pm, however he has not had any vomiting previously.     As I examined the patient , he was irritated and said " I am tired of all this." On my physical examination, he had positive bowel sounds, mildly distended abdomen and diffuse tenderness; he did not have any rebound or guarding.     I recommended we do blood work as well as XRAY KUB to assess for abdominal pain as well as nausea. The patient refused, however I explained to him the importance of the work up. He agreed.     [] f/u cbc, cmp  [] XRAY KUB

## 2019-12-05 NOTE — CHART NOTE - NSCHARTNOTEFT_GEN_A_CORE
Hypoglycemic in pre-op  Surgery rescheduled for Tue 12/10/19  Dressing changes daily: wet to dry  Medical optimization

## 2019-12-05 NOTE — CHART NOTE - NSCHARTNOTEFT_GEN_A_CORE
Patient: MARGE BELLA  MRN: [id number]  59y Male  Location: [facility] T2-3A 024 B  19 @ 06:12    Vitals:  T(F): 97.5 (19 @ 04:58), Max: 98.3 (19 @ 20:30)  HR: 75 (19 @ 04:58) (70 - 78)  BP: 143/76 (19 @ 04:58) (128/65 - 143/76)  RR: 18 (19 @ 04:58) (18 - 19)  SpO2: --    Procedure: closure of left below knee amputation   Pre-Op Diagnosis:  Consent in Chart: [x] Yes [  ] No  DIET:   Diet, DASH/TLC:   Sodium & Cholesterol Restricted  For patients receiving Renal Replacement - No Protein Restr, No Conc K, No Conc Phos, Low Sodium  Diet, NPO after Midnight:      NPO Start Date: 04-Dec-2019,   NPO Start Time: 23:59    FLUIDS:   sodium chloride 0.9%.: Solution, 1000 milliLiter(s) infuse at 100 mL/Hr      EK Lead ECG:   Ventricular Rate 81 BPM    Atrial Rate 81 BPM    P-R Interval 172 ms    QRS Duration 94 ms    Q-T Interval 398 ms    QTC Calculation(Bezet) 462 ms    P Axis 69 degrees    R Axis 15 degrees    T Axis 13 degrees    Diagnosis Line Normal sinus rhythm  Normal ECG    Confirmed by Sesar Lowe ([id number]) on 2019 6:26:25 AM (19 @ 20:36)    CXR:  Xray Chest 1 View-PORTABLE IMMEDIATE:   EXAM:  XR CHEST PORTABLE IMMED 1V            PROCEDURE DATE:  2019        INTERPRETATION:  Clinical History / Reason for exam: Shortness of breath    Comparison : Chest radiograph 10/14/2019.    Technique/Positioning: Single frontal chestradiograph.    Findings:    Support devices: None.    Cardiac/mediastinum/hilum: Normal size heart. Uncoiled aorta.    Lung parenchyma/Pleura: No focal pulmonary consolidation, pleural   effusion, or pneumothorax.    Skeleton/soft tissues: Post spinal fixation.    Impression:      No radiographic evidence of acute cardiopulmonary disease.      SIERRA LOPEZ M.D., ATTENDING RADIOLOGIST  This document has been electronically signed. 2019  9:09AM             (19 @ 18:32)    Pre-OP Labs:  CAPILLARY BLOOD GLUCOSE      POCT Blood Glucose.: 71 mg/dL (05 Dec 2019 02:05)  POCT Blood Glucose.: 91 mg/dL (04 Dec 2019 22:56)  POCT Blood Glucose.: 53 mg/dL (04 Dec 2019 20:41)  POCT Blood Glucose.: 81 mg/dL (04 Dec 2019 16:45)  POCT Blood Glucose.: 68 mg/dL (04 Dec 2019 11:29)  POCT Blood Glucose.: 111 mg/dL (04 Dec 2019 07:56)                          10.1   7.82  )-----------( 231      ( 04 Dec 2019 21:58 )             32.2     12-    136  |  98  |  45<H>  ----------------------------<  80  4.1   |  25  |  3.9<H>    Ca    8.2<L>      04 Dec 2019 21:58  Phos  4.9     12-  Mg     1.7     12-    TPro  4.6<L>  /  Alb  1.4<L>  /  TBili  <0.2  /  DBili  x   /  AST  14  /  ALT  7   /  AlkPhos  160<H>  12-04    PT/INR - ( 04 Dec 2019 21:58 )   PT: 17.40 sec;   INR: 1.52 ratio         PTT - ( 04 Dec 2019 21:58 )  PTT:38.9 sec    Type & Screen:   ABO RH Interpretation: O POS (19 @ 06:19)    Anticoagulation/Antiplatelet:  heparin  Injectable 5000 Unit(s) SubCutaneous every 12 hours

## 2019-12-05 NOTE — PRE-OP CHECKLIST - SELECT TESTS ORDERED
Results in MD note/INR/EKG/CMP/PT/PTT/BMP/CBC/CXR/POCT Blood Glucose/Type and Screen CBC/CMP/PT/PTT/CXR/Results in MD note/53 @ 10:55 12/5/19/BMP/INR/EKG/Type and Screen/POCT Blood Glucose

## 2019-12-05 NOTE — PROGRESS NOTE ADULT - ASSESSMENT
Mr. Booker is a 60 yo male patient with PMHx of PVD s/p R AKA, paraplegia with multiple chronic bedsores and suprapubic catheter, HTN (not on any medication), CKD stage IV (not following with nephrology), hypothyroidism, opioid dependance, recent admission due to unresponsiveness, presents for 3-4 days of left foot coldness, pain and worsening ulcers.    # Chronic left foot peripheral artery disease + dry gangrene  - Patient with chronic wound of left foot with gangrenous changes.   - Doppler US showed Moderate disease in the left SFA, No flow noted in the left posterior tibial artery   - Patient was not on any antiplatelets or statin.   - S/p BKA on 12/2/2019, schedule for closure on 12/5/2019 canceled due to hypoglycemia, reschedule for 12/10  - Echo results 55-60% , grade 1 pritesh dysfunction  - will keep on maintenance fluids, please hold off if O2 sat <92 percent    # Hypoglycemia  - hypoglycemia any-operatively  - on NS 75 cc/hour, pt with low baseline po intake  - EGD with GI possibly tomorrow  - will consult Endo for persistent hypoglycemia  - As per attending if still remains hypoglycemic obtain serum insulin levels, C-peptite, beta-hydroxybutyrate, sulfonylurea, serum proinsulin  - ordered for am  - poor po intake prior to OR and left BKA  - normal serum cortisol levels    # Poor PO intake, difficulty swallowing  - Patient with occasional difficulty swallowing at home  - dysphagia likely esophageal  - Speech and Swallow: need to follow up with GI  - pt not a candidate for Barium esophagram per Radiology  - GI: for possible Endoscopy on 12/6/19    # Hypothyroidism  - will need to re-check thyroid in 2 weeks ( week of 12/15)  - antibody work up sent: - thyroglobulin and thyroperoxidase negative thus far  - Thyroid ultrasounds: negative  - levothyroxine to 100 mcg daily    -# Sacral OM and gangrene  - (tachycardia + leukocytosis) on admission  - made Valium PRN due to low bp  - vancomycin 750 mg IV q12  - Flagyl 500 mg iv q8h  - Rocephin    # SILVINA on CKD + HAGMA  - non- oliguric  - Patient agreed to have temporary dialysis during inpatient stay only, or following with nephrologist.   - Patient with serum Creatinine  - Patient now agreeing to dialysis  - Consider LINDY and Epogen  - on sodium bicarb oral 650 mg bid  - can increase if Anion gap increases  - f/u Phos, f/u Mag, f/u bmp    # Normocytic anemia  - Iron deficiency and CKD,  multifactorial  - s/p 1 unit pRBC on 11/27  - Patient denies any melena, or hematochezia, noted history of hemorrhoids  - Will trend CBC    # HTN  - not on any medication  - off Amlodipine    # Opioid dependance  - Patient on methadone 80 mg QID +  - Start Oxycodone IR 20mg PO Q4hrs PRN   - s/p Narcan on 11/28  - high risk for overdose given CKD  - appreciate pain management recs as per Dr. Jackson    # Folate acid deficiency  - on folic acid po qd    # Vitamin D def  - 4000 qd daily    # Chronic sacral bedsore.  - Followed by Dr. Gooden      Please switch to d5 1/2 NS if NPO @ midnight for GI procedure Monitor FS.  Endo consult and hypoglycemia work up

## 2019-12-05 NOTE — PROGRESS NOTE ADULT - SUBJECTIVE AND OBJECTIVE BOX
SUBJECTIVE:    Patient is a 59y old Male who presents with a chief complaint of Cold left lower extremity (04 Dec 2019 11:23)    Currently admitted to medicine with the primary diagnosis of Cold foot with peripheral vascular disease     Today is hospital day 9d. This morning he is resting comfortably in bed and reports no new issues or overnight events.     PAST MEDICAL & SURGICAL HISTORY  Anemia  PAD (peripheral artery disease)  Hypertension  Suprapubic catheter  Pressure ulcer  Diabetes  Lumbar compression fracture  S/P below knee amputation, right  S/P debridement  Toe amputation status, right  H/O hernia repair    SOCIAL HISTORY:  Negative for smoking/alcohol/drug use.     ALLERGIES:  sulfamethizole (Unknown)    MEDICATIONS:  STANDING MEDICATIONS  ascorbic acid 500 milliGRAM(s) Oral daily  calcium acetate 667 milliGRAM(s) Oral three times a day with meals  calcium carbonate    500 mG (Tums) Chewable 1 Tablet(s) Chew every 8 hours  cefTRIAXone   IVPB 2000 milliGRAM(s) IV Intermittent every 24 hours  chlorhexidine 4% Liquid 1 Application(s) Topical <User Schedule>  cholecalciferol 4000 Unit(s) Oral daily  dextrose 10% Bolus 100 milliLiter(s) IV Bolus once  dextrose 10%. 250 milliLiter(s) IV Continuous <Continuous>  dextrose 50% Injectable 12.5 Gram(s) IV Push once  dextrose 50% Injectable 25 Gram(s) IV Push once  dextrose 50% Injectable 25 Gram(s) IV Push once  ferrous    sulfate 325 milliGRAM(s) Oral two times a day  folic acid 1 milliGRAM(s) Oral daily  heparin  Injectable 5000 Unit(s) SubCutaneous every 12 hours  levothyroxine 100 MICROGram(s) Oral daily  methadone    Tablet 80 milliGRAM(s) Oral every 6 hours  metroNIDAZOLE  IVPB 500 milliGRAM(s) IV Intermittent every 8 hours  mupirocin 2% Ointment 1 Application(s) Topical two times a day  oxybutynin 10 milliGRAM(s) Oral two times a day  pantoprazole    Tablet 40 milliGRAM(s) Oral every 12 hours  sodium bicarbonate 650 milliGRAM(s) Oral two times a day  sodium chloride 0.9%. 1000 milliLiter(s) IV Continuous <Continuous>  vancomycin  IVPB      vancomycin  IVPB 750 milliGRAM(s) IV Intermittent every 24 hours    PRN MEDICATIONS  acetaminophen   Tablet .. 650 milliGRAM(s) Oral every 6 hours PRN  dextrose 40% Gel 15 Gram(s) Oral once PRN  diazepam    Tablet 5 milliGRAM(s) Oral two times a day PRN  glucagon  Injectable 1 milliGRAM(s) IntraMuscular once PRN  oxyCODONE    IR 20 milliGRAM(s) Oral every 8 hours PRN    VITALS:   T(F): 96  HR: 67  BP: 134/72  RR: 18  SpO2: 97%    LABS:                        10.1   7.82  )-----------( 231      ( 04 Dec 2019 21:58 )             32.2     12-04    136  |  98  |  45<H>  ----------------------------<  80  4.1   |  25  |  3.9<H>    Ca    8.2<L>      04 Dec 2019 21:58  Phos  4.9     12-04  Mg     1.7     12-04    TPro  4.6<L>  /  Alb  1.4<L>  /  TBili  <0.2  /  DBili  x   /  AST  14  /  ALT  7   /  AlkPhos  160<H>  12-04    PT/INR - ( 04 Dec 2019 21:58 )   PT: 17.40 sec;   INR: 1.52 ratio         PTT - ( 04 Dec 2019 21:58 )  PTT:38.9 sec              RADIOLOGY:    PHYSICAL EXAM:  GENERAL: NAD, lying in bed comfortably  HEAD:  Atraumatic, Normocephalic  EYES: conjunctiva and sclera clear  ENT: Moist mucous membranes  CHEST/LUNG: Clear to auscultation bilaterally  HEART: Regular rate and rhythm; No murmurs  ABDOMEN: Bowel sounds present; Soft, Nontender, Nondistended. Suprapubic catheter noted, draining normal urine.  EXTREMITIES:  R sided BKA noted, new left sided BKA ( dressing with bloody discharge)  NERVOUS SYSTEM:  Alert & Oriented X3, speech clear. No deficits

## 2019-12-06 NOTE — PROGRESS NOTE ADULT - ASSESSMENT
Mr. Booker is a 58 yo male patient with Pmhx of PVD s/p R AKA, paraplegia with multiple chronic bedsores and suprapubic catheter, HTN (not on any medication), CKD stage IV (not following with nephrology), hypothyroidism, opioid dependance, recent admission due to unresponsiveness, presents for 3-4 days of left foot coldness, pain and worsening ulcers.  Patient being treated for L Foot Gangrene and SILVINA. GI called for dysphagia.    # Intermittent Dysphagia to solid rule out malignancy vs esophageal web/ stricture/ zencker   Pt had foot surgery on dec 2 and he was scheduled for closure on 12/5 but developed hypoglycemia so procedure was postponed to 12/10    Patient and his wife they are worried about the EGD because of the patient infection and recent surgery and they prefer to hold on any intervention for now     Rec:   Pt would benefits from EGD. After hypoglycemia corrected  and after surgery next week, if pt is agreeable will consider EGD later next week  PPI BID  Needs colonoscopy that can be done as outpatient

## 2019-12-06 NOTE — PROGRESS NOTE ADULT - SUBJECTIVE AND OBJECTIVE BOX
Nephrology progress note    Patient was seen and examined, events over the last 24 h noted .  Cr stable    Allergies:  sulfamethizole (Unknown)    Hospital Medications:   MEDICATIONS  (STANDING):  ascorbic acid 500 milliGRAM(s) Oral daily  calcium acetate 667 milliGRAM(s) Oral three times a day with meals  calcium carbonate    500 mG (Tums) Chewable 1 Tablet(s) Chew every 8 hours  cefTRIAXone   IVPB 2000 milliGRAM(s) IV Intermittent every 24 hours  chlorhexidine 4% Liquid 1 Application(s) Topical <User Schedule>  cholecalciferol 4000 Unit(s) Oral daily  dextrose 5% + sodium chloride 0.9%. 1000 milliLiter(s) (75 mL/Hr) IV Continuous <Continuous>  dextrose 50% Injectable 12.5 Gram(s) IV Push once  dextrose 50% Injectable 25 Gram(s) IV Push once  dextrose 50% Injectable 25 Gram(s) IV Push once  ferrous    sulfate 325 milliGRAM(s) Oral two times a day  folic acid 1 milliGRAM(s) Oral daily  heparin  Injectable 5000 Unit(s) SubCutaneous every 12 hours  levothyroxine 100 MICROGram(s) Oral daily  methadone    Tablet 80 milliGRAM(s) Oral every 6 hours  metroNIDAZOLE  IVPB 500 milliGRAM(s) IV Intermittent every 8 hours  mupirocin 2% Ointment 1 Application(s) Topical two times a day  oxybutynin 10 milliGRAM(s) Oral two times a day  pantoprazole    Tablet 40 milliGRAM(s) Oral every 12 hours  sodium bicarbonate 650 milliGRAM(s) Oral two times a day  vancomycin  IVPB      vancomycin  IVPB 750 milliGRAM(s) IV Intermittent every 24 hours        VITALS:  T(F): 96.8 (12-06-19 @ 05:00), Max: 96.8 (12-06-19 @ 05:00)  HR: 70 (12-06-19 @ 05:00)  BP: 133/72 (12-06-19 @ 05:00)  RR: 19 (12-06-19 @ 05:00)  SpO2: 97% (12-06-19 @ 08:13)  Wt(kg): --    12-04 @ 07:01  -  12-05 @ 07:00  --------------------------------------------------------  IN: 0 mL / OUT: 3000 mL / NET: -3000 mL    12-05 @ 07:01  -  12-06 @ 07:00  --------------------------------------------------------  IN: 0 mL / OUT: 3000 mL / NET: -3000 mL          PHYSICAL EXAM:  Constitutional: NAD  HEENT: anicteric sclera, oropharynx clear, MMM  Neck: No JVD  Respiratory: CTAB, no wheezes, rales or rhonchi  Cardiovascular: S1, S2, RRR  Gastrointestinal: BS+, soft, NT/ND  Extremities: No cyanosis or clubbing. No peripheral edema  :  No brown.   Skin: No rashes    LABS:  12-05    134<L>  |  99  |  42<H>  ----------------------------<  31<LL>  3.9   |  23  |  3.6<H>    Ca    8.0<L>      05 Dec 2019 20:54  Phos  4.9     12-04  Mg     1.7     12-04    TPro  4.6<L>  /  Alb  1.5<L>  /  TBili  <0.2  /  DBili      /  AST  18  /  ALT  <5  /  AlkPhos  160<H>  12-05                          10.5   9.06  )-----------( 229      ( 05 Dec 2019 20:54 )             33.0       Urine Studies:      RADIOLOGY & ADDITIONAL STUDIES:

## 2019-12-06 NOTE — PROGRESS NOTE ADULT - SUBJECTIVE AND OBJECTIVE BOX
We are following the patient for Dysphagia     GI HPI Today:  Pt complaining of generalized body pain and he is upset because not given pain meds. He told me that dysphagia it is not his main problem now and he needs to take care of other stuff at the moment and he is still worried about the infection     PAST MEDICAL & SURGICAL HISTORY  Anemia  PAD (peripheral artery disease)  Hypertension  Suprapubic catheter  Pressure ulcer  Diabetes  Lumbar compression fracture  S/P below knee amputation, right  S/P debridement  Toe amputation status, right  H/O hernia repair      ALLERGIES:  sulfamethizole (Unknown)      MEDICATIONS:  MEDICATIONS  (STANDING):  ascorbic acid 500 milliGRAM(s) Oral daily  calcium acetate 667 milliGRAM(s) Oral three times a day with meals  calcium carbonate    500 mG (Tums) Chewable 1 Tablet(s) Chew every 8 hours  cefTRIAXone   IVPB 2000 milliGRAM(s) IV Intermittent every 24 hours  chlorhexidine 4% Liquid 1 Application(s) Topical <User Schedule>  cholecalciferol 4000 Unit(s) Oral daily  dextrose 10%. 250 milliLiter(s) (500 mL/Hr) IV Continuous <Continuous>  dextrose 50% Injectable 12.5 Gram(s) IV Push once  dextrose 50% Injectable 25 Gram(s) IV Push once  dextrose 50% Injectable 25 Gram(s) IV Push once  ferrous    sulfate 325 milliGRAM(s) Oral two times a day  folic acid 1 milliGRAM(s) Oral daily  heparin  Injectable 5000 Unit(s) SubCutaneous every 12 hours  levothyroxine 100 MICROGram(s) Oral daily  methadone    Tablet 80 milliGRAM(s) Oral every 6 hours  metroNIDAZOLE  IVPB 500 milliGRAM(s) IV Intermittent every 8 hours  mupirocin 2% Ointment 1 Application(s) Topical two times a day  oxybutynin 10 milliGRAM(s) Oral two times a day  pantoprazole    Tablet 40 milliGRAM(s) Oral every 12 hours  sodium bicarbonate 650 milliGRAM(s) Oral two times a day  sodium chloride 0.9%. 1000 milliLiter(s) (100 mL/Hr) IV Continuous <Continuous>  vancomycin  IVPB      vancomycin  IVPB 750 milliGRAM(s) IV Intermittent every 24 hours    MEDICATIONS  (PRN):  acetaminophen   Tablet .. 650 milliGRAM(s) Oral every 6 hours PRN Temp greater or equal to 38C (100.4F), Mild Pain (1 - 3)  dextrose 40% Gel 15 Gram(s) Oral once PRN Blood Glucose LESS THAN 70 milliGRAM(s)/deciLiter  diazepam    Tablet 5 milliGRAM(s) Oral two times a day PRN anxiety  glucagon  Injectable 1 milliGRAM(s) IntraMuscular once PRN Glucose <70 milliGRAM(s)/deciLiter  oxyCODONE    IR 20 milliGRAM(s) Oral every 8 hours PRN Severe Pain (7 - 10)      REVIEW OF SYSTEMS  General:  No fevers  Eyes:  No reported pain   ENT:  No sore throat   NECK: No stiffness   CV:  No chest pain   Resp:  No shortness of breath  GI:  See HPI  :  No dysuria  Muscle: +++ weakness and generalized body aches   Neuro:  No tingling  Endocrine:  No polyuria  Heme:  No ecchymosis        VITALS:   T(F): 96.8 (12-06 @ 05:00), Max: 98.3 (12-04 @ 20:30)  HR: 70 (12-06 @ 05:00) (67 - 78)  BP: 133/72 (12-06 @ 05:00) (114/62 - 152/77)  BP(mean): --  RR: 19 (12-06 @ 05:00) (17 - 19)  SpO2: 98% (12-06 @ 06:05) (97% - 98%)        PHYSICAL EXAM:  GENERAL:  Appears in no distress  HEENT:  Conjunctivae Anicteric   CHEST:  Full & symmetric excursion  HEART:  NS1, S2,   ABDOMEN:  Soft, non-tender, non-distended  NEURO:  Alert         Blood Work :                        10.5   9.06  )-----------( 229      ( 05 Dec 2019 20:54 )             33.0     PT/INR - ( 04 Dec 2019 21:58 )  INR: 1.52          PTT - ( 04 Dec 2019 21:58 )  PTT:38.9   12-05    134<L>  |  99  |  42<H>  ----------------------------<  31<LL>  3.9   |  23  |  3.6<H>    Ca    8.0<L>      05 Dec 2019 20:54  Phos  4.9     12-04  Mg     1.7     12-04      CBC -  ( 05 Dec 2019 20:54 )  Hemoglobin : 10.5    CBC -  ( 04 Dec 2019 21:58 )  Hemoglobin : 10.1    CBC -  ( 04 Dec 2019 06:19 )  Hemoglobin : 7.9    CBC -  ( 03 Dec 2019 07:15 )  Hemoglobin : 7.5      LIVER FUNCTIONS - ( 05 Dec 2019 20:54 )  Alb: 1.5 [3.5 - 5.2] / Pro: 4.6 [6.0 - 8.0] / ALK PHOS: 160 [30 - 115] / ALT: <5 [0 - 41] / AST: 18 [0 - 41] / GGT: x     LIVER FUNCTIONS - ( 04 Dec 2019 06:19 )  Alb: 1.4 [3.5 - 5.2] / Pro: 4.6 [6.0 - 8.0] / ALK PHOS: 160 [30 - 115] / ALT: 7 [0 - 41] / AST: 14 [0 - 41] / GGT: x     LIVER FUNCTIONS - ( 03 Dec 2019 07:15 )  Alb: 1.5 [3.5 - 5.2] / Pro: 4.5 [6.0 - 8.0] / ALK PHOS: 151 [30 - 115] / ALT: 7 [0 - 41] / AST: 11 [0 - 41] / GGT: x     LIVER FUNCTIONS - ( 02 Dec 2019 06:45 )  Alb: 1.5 [3.5 - 5.2] / Pro: 4.6 [6.0 - 8.0] / ALK PHOS: 160 [30 - 115] / ALT: 11 [0 - 41] / AST: 12 [0 - 41] / GGT: x     LIVER FUNCTIONS - ( 01 Dec 2019 07:09 )  Alb: 1.4 [3.5 - 5.2] / Pro: 4.4 [6.0 - 8.0] / ALK PHOS: 156 [30 - 115] / ALT: 11 [0 - 41] / AST: 11 [0 - 41] / GGT: x     LIVER FUNCTIONS - ( 30 Nov 2019 11:40 )  Alb: 1.6 [3.5 - 5.2] / Pro: 4.5 [6.0 - 8.0] / ALK PHOS: 155 [30 - 115] / ALT: 11 [0 - 41] / AST: 12 [0 - 41] / GGT: x

## 2019-12-06 NOTE — CHART NOTE - NSCHARTNOTEFT_GEN_A_CORE
3-Day CC results (only 2 days, d/t NPO status on day 3)    Day 1: 484.5 kcal; 25 g protein  Day 2: 173 kcal; 2 g protein    2-day average: 329 kcal; 13.5 g protein    Estimated energy/protein requirements: 4854-3551 kcal; 63-75 g protein  Nutrient intake: On average pt is meeting 15-17% estimated energy requirement and 18-21% estimated protein requirement      See comprehensive reassessment for recommendations.

## 2019-12-06 NOTE — CHART NOTE - NSCHARTNOTEFT_GEN_A_CORE
Registered Dietitian Follow-Up     Patient Profile Reviewed                           Yes [x]   No []     Nutrition History Previously Obtained        Yes [x]  No []       Pertinent Subjective Information: Pt reports very poor appetite and states that he has really only been consuming cereal and juice since previous assessment. Pt denies nausea/abdominal pain. Pt having some swallowing difficulty, being followed by GI for dysphagia. Pt having regular BMs, last on was 12/5. No reports of diarrhea/constipation.      Pertinent Medical Interventions: Per Dr. Nieto [phone], pt has gastroparesis and he does not want to begin appetite stimulant  bc he does not believe poor appetite is the issue. Pt needs EGD, however per EMR pt and wife want to hold off. If agreeable, pt to have EGD next week.    Pt had foot sx on 12/2 and was scheduled for closure on 12/5, but was hypoglycemic so procedure is postponed to 12/10.      Diet order: DASH/TLC, renal restrictions     Anthropometrics:  - Ht. 73"  - Wt. dosing 63.4 kg/139.5 lbs (no new wt since previous assessment)  - %wt change NA  - BMI 20.9 adjusted for b/l BKA  - IBW 73.6 kg/162 lbs (IBW - 12% for b/l BKA)     Pertinent Lab Data: (12/5) RBC 3.75, H/H 10.5/33.0, BUN 42, Cr 3.6, glucose 31; (12/6) POC glucose      Pertinent Meds: heparin, vancomycin, rocephin, D5 + NaCl 0.9%, sodium bicarbonate, vitamin C, phoslo, TUMS, vitamin D3, ferrous sulfate, folic acid, protonix     Physical Findings:  - Appearance: well nourished; no edema noted  - GI function: no reported diarrhea/constipation at this time  - Tubes: NA  - Oral/Mouth cavity: per SLP 11/29 "+toleration for thin liquids w/o overt s/s penetration/aspiration. Pt w/ immediate vomiting post trials of thin liquids. RN and MD aware. No solid trials presented, however symptoms appear to be r/t possible presence esophageal dysphagia."  - Skin: Stage III pressure ulcer sacral spine, stage II pressure ulcer L IT, stage III pressure ulcer R buttocks, stage II pressure ulcer R buttocks, stage III pressure ulcer R knee     Nutrition Requirements  Weight Used: 63.4 kg      calorie needs: 1902 - 2219 kcals/day (30-35 kcals/kg for pressure ulcer)  protein needs: 63 - 75 gms/day (1.0 - 1.2  gm/kg for CKD4 and PU's)  fluid needs: per renal     Nutrient Intake: < 25% based on calorie count results (poor)        [x] Previous Nutrition Diagnosis: 1. Inadequate oral intake 2. Increased nutrient needs            [x] Ongoing             Nutrition Intervention meals and snacks, nutrition related medication management, coordination of care     Goal/Expected Outcome: pt to maintain po intake >50% over the next 4 days     Indicator/Monitoring: RD to monitor diet order, energy intake, body composition, NFPF, glucose profile, renal profile    Recommendation: consider liberalizing diet order to promote po intake, consider appetite stimulant (eg. marinol), encourage po intake, provide assistance with meals PRN, consider need for EN vs PN on f/u if interventions are unsuccessful (may need to c/s NST)

## 2019-12-06 NOTE — PROGRESS NOTE ADULT - ASSESSMENT
59/M with SILVINA on CKD 4 (baseline creat 3.8 mg% Oct 2019) SPC, severe PVD, presented with cold Lt LE and ulcers, found to have severe anemia Hb 6.8 and acidosis.    Cr stable and at baseline  # non oliguric  # creatinine stable   # last ph at goal, on binders, PTH noted, no need for 1-25 vit D   # h/h noted, no need for venofer sat in the 30 range,  hgb at goal  seen by GI for EGD later next week (for dysphagia)  #ID notes appreciated on vanco rocephin and flagyl   # patient with significant decrease in GFR in view of low muscle mass ( amputations)  # bicarb noted keep same dose of sodium bicarb for now / may decrease dose if Bicarb>27  # patient is refusing hd , no acute indication for it   #will follow

## 2019-12-06 NOTE — CONSULT NOTE ADULT - SUBJECTIVE AND OBJECTIVE BOX
The patient is a 59-year-old male with a PMH of HTN, CKD stage IV, hypothyroidism, PVD s/p right AKA and left BKA, functional paraplegia s/p suprapubic catheter, and opioid dependence who presented with coldness sensation and pain in his LLE.  He was evaluated by vascular surgery and underwent a left BKA on 12/2/19.  He was scheduled to undergo wound closure but developed hypoglycemia any-operatively.  As per report, the patient received dextrose supplementation for the hypoglycemia but his FS decreased again shortly afterwards.  The patient stated that he has been experiencing episodes of hypoglycemia for the past 3 months.  During this time period he has also reported to having a decreased appetite.  As per his brother at bedside, the patient has been found to be lethargic at home multiple times in recent months.  The denied having a history of diabetes.  Currently, he denied having tremors, palpitations, or lightheadedness.    PAST MEDICAL & SURGICAL HISTORY:  Anemia  PAD (peripheral artery disease)  Hypertension  Suprapubic catheter  Pressure ulcer  Diabetes  Lumbar compression fracture  S/P below knee amputation, right  S/P debridement  Toe amputation status, right  H/O hernia repair    MEDICATIONS  (STANDING):  ascorbic acid 500 milliGRAM(s) Oral daily  calcium acetate 667 milliGRAM(s) Oral three times a day with meals  calcium carbonate    500 mG (Tums) Chewable 1 Tablet(s) Chew every 8 hours  cefTRIAXone   IVPB 2000 milliGRAM(s) IV Intermittent every 24 hours  chlorhexidine 4% Liquid 1 Application(s) Topical <User Schedule>  cholecalciferol 4000 Unit(s) Oral daily  dextrose 5% + sodium chloride 0.9%. 1000 milliLiter(s) (75 mL/Hr) IV Continuous <Continuous>  dextrose 50% Injectable 12.5 Gram(s) IV Push once  dextrose 50% Injectable 25 Gram(s) IV Push once  dextrose 50% Injectable 25 Gram(s) IV Push once  ferrous    sulfate 325 milliGRAM(s) Oral two times a day  folic acid 1 milliGRAM(s) Oral daily  heparin  Injectable 5000 Unit(s) SubCutaneous every 12 hours  levothyroxine 100 MICROGram(s) Oral daily  methadone    Tablet 80 milliGRAM(s) Oral every 6 hours  metroNIDAZOLE  IVPB 500 milliGRAM(s) IV Intermittent every 8 hours  mupirocin 2% Ointment 1 Application(s) Topical two times a day  oxybutynin 10 milliGRAM(s) Oral two times a day  pantoprazole    Tablet 40 milliGRAM(s) Oral every 12 hours  sodium bicarbonate 650 milliGRAM(s) Oral two times a day  vancomycin  IVPB      vancomycin  IVPB 750 milliGRAM(s) IV Intermittent every 24 hours    MEDICATIONS  (PRN):  acetaminophen   Tablet .. 650 milliGRAM(s) Oral every 6 hours PRN Temp greater or equal to 38C (100.4F), Mild Pain (1 - 3)  dextrose 40% Gel 15 Gram(s) Oral once PRN Blood Glucose LESS THAN 70 milliGRAM(s)/deciLiter  diazepam    Tablet 5 milliGRAM(s) Oral two times a day PRN anxiety  glucagon  Injectable 1 milliGRAM(s) IntraMuscular once PRN Glucose <70 milliGRAM(s)/deciLiter  oxyCODONE    IR 20 milliGRAM(s) Oral every 8 hours PRN Severe Pain (7 - 10)    Allergy: sulfamethizole (Unknown)    Vital Signs Last 24 Hrs  T(C): 36 (06 Dec 2019 05:00), Max: 36 (06 Dec 2019 05:00)  T(F): 96.8 (06 Dec 2019 05:00), Max: 96.8 (06 Dec 2019 05:00)  HR: 70 (06 Dec 2019 05:00) (67 - 72)  BP: 133/72 (06 Dec 2019 05:00) (133/72 - 136/68)  BP(mean): --  RR: 19 (06 Dec 2019 05:00) (18 - 19)  SpO2: 97% (06 Dec 2019 08:13) (97% - 98%)    POCT Blood Glucose.: 213 mg/dL (06 Dec 2019 07:37)  POCT Blood Glucose.: 85 mg/dL (06 Dec 2019 02:37)  POCT Blood Glucose.: 60 mg/dL (06 Dec 2019 01:56)  POCT Blood Glucose.: 136 mg/dL (05 Dec 2019 22:02)  POCT Blood Glucose.: 31 mg/dL (05 Dec 2019 21:26)  POCT Blood Glucose.: 34 mg/dL (05 Dec 2019 21:24)  POCT Blood Glucose.: 128 mg/dL (05 Dec 2019 16:31)  POCT Blood Glucose.: 122 mg/dL (05 Dec 2019 14:22)  POCT Blood Glucose.: 512 mg/dL (05 Dec 2019 14:14)  POCT Blood Glucose.: 58 mg/dL (05 Dec 2019 13:04)  POCT Blood Glucose.: 130 mg/dL (05 Dec 2019 11:30)    Physical Exam:    -     General : alert, awake and in no acute distress    -      Cardiac: RRR    -      Pulm: CTA B/L    -      GI: abdomen soft, non-tender    -      Musculoskeletal: bilateral BKA    -      Neuro: AAO x3    Labs:                        10.5   9.06  )-----------( 229      ( 05 Dec 2019 20:54 )             33.0             12-05    134<L>  |  99  |  42<H>  ----------------------------<  31<LL>  3.9   |  23  |  3.6<H>    Ca    8.0<L>      05 Dec 2019 20:54  Phos  4.9     12-04  Mg     1.7     12-04    TPro  4.6<L>  /  Alb  1.5<L>  /  TBili  <0.2  /  DBili  x   /  AST  18  /  ALT  <5  /  AlkPhos  160<H>  12-05    LIVER FUNCTIONS - ( 05 Dec 2019 20:54 )  Alb: 1.5 g/dL / Pro: 4.6 g/dL / ALK PHOS: 160 U/L / ALT: <5 U/L / AST: 18 U/L / GGT: x         PT/INR - ( 04 Dec 2019 21:58 )   PT: 17.40 sec;   INR: 1.52 ratio    PTT - ( 04 Dec 2019 21:58 )  PTT:38.9 sec    Hemoglobin A1C with Mean Plasma Glucose (06.27.19 @ 06:35)    Hemoglobin A1C, Whole Blood: 5.5: Method: Immunoassay    Thyroid Stimulating Hormone, Serum in AM (11.29.19 @ 07:32)    Thyroid Stimulating Hormone, Serum: 70.10 uIU/mL    Thyroglobulin Antibodies (11.30.19 @ 14:36)    Thyroglobulin Antibodies: <20.0 IU/mL    Anti Thyroid Antibodies (11.30.19 @ 14:36)    Thyroglobulin Antibodies: <20.0 IU/mL    Thyroperoxidase Antibody: <10.0 IU/mL    Free Thyroxine, Serum in AM (11.29.19 @ 07:32)    Free Thyroxine, Serum: 0.3 ng/dL    < from: US Thyroid + Parathyroid (12.01.19 @ 12:54) >  Impression: Heterogeneous thyroid gland without discrete nodule.  < end of copied text >    Cortisol AM, Serum (11.28.19 @ 08:29)    Cortisol AM, Serum: 10.1 ug/dL    Adrenocorticotropic Hormone, Serum (07.04.19 @ 16:46)    Adrenocorticotropic Hormone, Serum: 4.8 pg/mL The patient is a 59-year-old male with a PMH of HTN, CKD stage IV, hypothyroidism, PVD s/p right AKA and left BKA, functional paraplegia s/p suprapubic catheter, and opioid dependence who presented with coldness sensation and pain in his LLE.  He was evaluated by vascular surgery and underwent a left BKA on 12/2/19.  He was scheduled to undergo wound closure but developed hypoglycemia any-operatively.  As per report, the patient received dextrose supplementation for the hypoglycemia but his FS decreased again shortly afterwards.  The patient stated that he has been experiencing episodes of hypoglycemia for the past 3 months.  During this time period he has also reported to having a decreased appetite.  The patient denied experiencing any significant symptoms during these recent episodes.  As per his brother at bedside, the patient has been found to be lethargic at home multiple times in recent months.  The denied having a history of diabetes.  Currently, he denied having tremors, palpitations, or lightheadedness.    PAST MEDICAL & SURGICAL HISTORY:  Anemia  PAD (peripheral artery disease)  Hypertension  Suprapubic catheter  Pressure ulcer  Diabetes  Lumbar compression fracture  S/P below knee amputation, right  S/P debridement  Toe amputation status, right  H/O hernia repair    MEDICATIONS  (STANDING):  ascorbic acid 500 milliGRAM(s) Oral daily  calcium acetate 667 milliGRAM(s) Oral three times a day with meals  calcium carbonate    500 mG (Tums) Chewable 1 Tablet(s) Chew every 8 hours  cefTRIAXone   IVPB 2000 milliGRAM(s) IV Intermittent every 24 hours  chlorhexidine 4% Liquid 1 Application(s) Topical <User Schedule>  cholecalciferol 4000 Unit(s) Oral daily  dextrose 5% + sodium chloride 0.9%. 1000 milliLiter(s) (75 mL/Hr) IV Continuous <Continuous>  dextrose 50% Injectable 12.5 Gram(s) IV Push once  dextrose 50% Injectable 25 Gram(s) IV Push once  dextrose 50% Injectable 25 Gram(s) IV Push once  ferrous    sulfate 325 milliGRAM(s) Oral two times a day  folic acid 1 milliGRAM(s) Oral daily  heparin  Injectable 5000 Unit(s) SubCutaneous every 12 hours  levothyroxine 100 MICROGram(s) Oral daily  methadone    Tablet 80 milliGRAM(s) Oral every 6 hours  metroNIDAZOLE  IVPB 500 milliGRAM(s) IV Intermittent every 8 hours  mupirocin 2% Ointment 1 Application(s) Topical two times a day  oxybutynin 10 milliGRAM(s) Oral two times a day  pantoprazole    Tablet 40 milliGRAM(s) Oral every 12 hours  sodium bicarbonate 650 milliGRAM(s) Oral two times a day  vancomycin  IVPB      vancomycin  IVPB 750 milliGRAM(s) IV Intermittent every 24 hours    MEDICATIONS  (PRN):  acetaminophen   Tablet .. 650 milliGRAM(s) Oral every 6 hours PRN Temp greater or equal to 38C (100.4F), Mild Pain (1 - 3)  dextrose 40% Gel 15 Gram(s) Oral once PRN Blood Glucose LESS THAN 70 milliGRAM(s)/deciLiter  diazepam    Tablet 5 milliGRAM(s) Oral two times a day PRN anxiety  glucagon  Injectable 1 milliGRAM(s) IntraMuscular once PRN Glucose <70 milliGRAM(s)/deciLiter  oxyCODONE    IR 20 milliGRAM(s) Oral every 8 hours PRN Severe Pain (7 - 10)    Allergy: sulfamethizole (Unknown)    Vital Signs Last 24 Hrs  T(C): 36 (06 Dec 2019 05:00), Max: 36 (06 Dec 2019 05:00)  T(F): 96.8 (06 Dec 2019 05:00), Max: 96.8 (06 Dec 2019 05:00)  HR: 70 (06 Dec 2019 05:00) (67 - 72)  BP: 133/72 (06 Dec 2019 05:00) (133/72 - 136/68)  BP(mean): --  RR: 19 (06 Dec 2019 05:00) (18 - 19)  SpO2: 97% (06 Dec 2019 08:13) (97% - 98%)    POCT Blood Glucose.: 213 mg/dL (06 Dec 2019 07:37)  POCT Blood Glucose.: 85 mg/dL (06 Dec 2019 02:37)  POCT Blood Glucose.: 60 mg/dL (06 Dec 2019 01:56)  POCT Blood Glucose.: 136 mg/dL (05 Dec 2019 22:02)  POCT Blood Glucose.: 31 mg/dL (05 Dec 2019 21:26)  POCT Blood Glucose.: 34 mg/dL (05 Dec 2019 21:24)  POCT Blood Glucose.: 128 mg/dL (05 Dec 2019 16:31)  POCT Blood Glucose.: 122 mg/dL (05 Dec 2019 14:22)  POCT Blood Glucose.: 512 mg/dL (05 Dec 2019 14:14)  POCT Blood Glucose.: 58 mg/dL (05 Dec 2019 13:04)  POCT Blood Glucose.: 130 mg/dL (05 Dec 2019 11:30)    Physical Exam:    -     General : alert, awake and in no acute distress    -      Cardiac: RRR    -      Pulm: CTA B/L    -      GI: abdomen soft, non-tender    -      Musculoskeletal: bilateral BKA    -      Neuro: AAO x3    Labs:                        10.5   9.06  )-----------( 229      ( 05 Dec 2019 20:54 )             33.0             12-05    134<L>  |  99  |  42<H>  ----------------------------<  31<LL>  3.9   |  23  |  3.6<H>    Ca    8.0<L>      05 Dec 2019 20:54  Phos  4.9     12-04  Mg     1.7     12-04    TPro  4.6<L>  /  Alb  1.5<L>  /  TBili  <0.2  /  DBili  x   /  AST  18  /  ALT  <5  /  AlkPhos  160<H>  12-05    LIVER FUNCTIONS - ( 05 Dec 2019 20:54 )  Alb: 1.5 g/dL / Pro: 4.6 g/dL / ALK PHOS: 160 U/L / ALT: <5 U/L / AST: 18 U/L / GGT: x         PT/INR - ( 04 Dec 2019 21:58 )   PT: 17.40 sec;   INR: 1.52 ratio    PTT - ( 04 Dec 2019 21:58 )  PTT:38.9 sec    Hemoglobin A1C with Mean Plasma Glucose (06.27.19 @ 06:35)    Hemoglobin A1C, Whole Blood: 5.5: Method: Immunoassay    Thyroid Stimulating Hormone, Serum in AM (11.29.19 @ 07:32)    Thyroid Stimulating Hormone, Serum: 70.10 uIU/mL    Thyroglobulin Antibodies (11.30.19 @ 14:36)    Thyroglobulin Antibodies: <20.0 IU/mL    Anti Thyroid Antibodies (11.30.19 @ 14:36)    Thyroglobulin Antibodies: <20.0 IU/mL    Thyroperoxidase Antibody: <10.0 IU/mL    Free Thyroxine, Serum in AM (11.29.19 @ 07:32)    Free Thyroxine, Serum: 0.3 ng/dL    < from: US Thyroid + Parathyroid (12.01.19 @ 12:54) >  Impression: Heterogeneous thyroid gland without discrete nodule.  < end of copied text >    Cortisol AM, Serum (11.28.19 @ 08:29)    Cortisol AM, Serum: 10.1 ug/dL    Adrenocorticotropic Hormone, Serum (07.04.19 @ 16:46)    Adrenocorticotropic Hormone, Serum: 4.8 pg/mL

## 2019-12-06 NOTE — PROGRESS NOTE ADULT - SUBJECTIVE AND OBJECTIVE BOX
SUBJECTIVE:     Today is hospital day 10d. No EGD today, continues to by hyperglycemic    PAST MEDICAL & SURGICAL HISTORY  Anemia  PAD (peripheral artery disease)  Hypertension  Suprapubic catheter  Pressure ulcer  Diabetes  Lumbar compression fracture  S/P below knee amputation, right  S/P debridement  Toe amputation status, right  H/O hernia repair      ALLERGIES:  sulfamethizole (Unknown)    MEDICATIONS:  STANDING MEDICATIONS  ascorbic acid 500 milliGRAM(s) Oral daily  calcium acetate 667 milliGRAM(s) Oral three times a day with meals  calcium carbonate    500 mG (Tums) Chewable 1 Tablet(s) Chew every 8 hours  cefTRIAXone   IVPB 2000 milliGRAM(s) IV Intermittent every 24 hours  chlorhexidine 4% Liquid 1 Application(s) Topical <User Schedule>  cholecalciferol 4000 Unit(s) Oral daily  dextrose 5% + sodium chloride 0.9%. 1000 milliLiter(s) IV Continuous <Continuous>  dextrose 50% Injectable 12.5 Gram(s) IV Push once  dextrose 50% Injectable 25 Gram(s) IV Push once  dextrose 50% Injectable 25 Gram(s) IV Push once  ferrous    sulfate 325 milliGRAM(s) Oral two times a day  folic acid 1 milliGRAM(s) Oral daily  heparin  Injectable 5000 Unit(s) SubCutaneous every 12 hours  levothyroxine 100 MICROGram(s) Oral daily  methadone    Tablet 80 milliGRAM(s) Oral every 6 hours  metroNIDAZOLE  IVPB 500 milliGRAM(s) IV Intermittent every 8 hours  mupirocin 2% Ointment 1 Application(s) Topical two times a day  oxybutynin 10 milliGRAM(s) Oral two times a day  pantoprazole    Tablet 40 milliGRAM(s) Oral every 12 hours  sodium bicarbonate 650 milliGRAM(s) Oral two times a day  vancomycin  IVPB      vancomycin  IVPB 750 milliGRAM(s) IV Intermittent every 24 hours    PRN MEDICATIONS  acetaminophen   Tablet .. 650 milliGRAM(s) Oral every 6 hours PRN  dextrose 40% Gel 15 Gram(s) Oral once PRN  diazepam    Tablet 5 milliGRAM(s) Oral two times a day PRN  glucagon  Injectable 1 milliGRAM(s) IntraMuscular once PRN  oxyCODONE    IR 20 milliGRAM(s) Oral every 8 hours PRN    VITALS:   T(F): 97.7  HR: 95  BP: 130/85  RR: 20  SpO2: 97%    LABS:                        10.5   9.06  )-----------( 229      ( 05 Dec 2019 20:54 )             33.0     12-05    134<L>  |  99  |  42<H>  ----------------------------<  31<LL>  3.9   |  23  |  3.6<H>    Ca    8.0<L>      05 Dec 2019 20:54  Phos  4.9     12-04  Mg     1.7     12-04    TPro  4.6<L>  /  Alb  1.5<L>  /  TBili  <0.2  /  DBili  x   /  AST  18  /  ALT  <5  /  AlkPhos  160<H>  12-05    PT/INR - ( 04 Dec 2019 21:58 )   PT: 17.40 sec;   INR: 1.52 ratio         PTT - ( 04 Dec 2019 21:58 )  PTT:38.9 sec              RADIOLOGY:    < from: Xray Kidney Ureter Bladder (12.05.19 @ 20:15) >    Impression:    Gaseous distention of several loops of large and small bowel. Findings   are nonspecific and may reflect ileus versus obstruction.    < end of copied text >      PHYSICAL EXAM:  GENERAL: NAD, lying in bed comfortably  HEAD:  Atraumatic, Normocephalic  EYES: conjunctiva and sclera clear  ENT: Moist mucous membranes  CHEST/LUNG: Clear to auscultation bilaterally  HEART: Regular rate and rhythm; No murmurs  ABDOMEN: Bowel sounds present; Soft, Nontender, Nondistended. Suprapubic catheter noted, draining normal urine.  EXTREMITIES:  R sided BKA noted, new left sided BKA ( dressing c/d/i)  NERVOUS SYSTEM:  Alert & Oriented X3, speech clear. No deficits SUBJECTIVE:     Today is hospital day 10d. No EGD today, continues to by hypoglycemic    PAST MEDICAL & SURGICAL HISTORY  Anemia  PAD (peripheral artery disease)  Hypertension  Suprapubic catheter  Pressure ulcer  Diabetes  Lumbar compression fracture  S/P below knee amputation, right  S/P debridement  Toe amputation status, right  H/O hernia repair      ALLERGIES:  sulfamethizole (Unknown)    MEDICATIONS:  STANDING MEDICATIONS  ascorbic acid 500 milliGRAM(s) Oral daily  calcium acetate 667 milliGRAM(s) Oral three times a day with meals  calcium carbonate    500 mG (Tums) Chewable 1 Tablet(s) Chew every 8 hours  cefTRIAXone   IVPB 2000 milliGRAM(s) IV Intermittent every 24 hours  chlorhexidine 4% Liquid 1 Application(s) Topical <User Schedule>  cholecalciferol 4000 Unit(s) Oral daily  dextrose 5% + sodium chloride 0.9%. 1000 milliLiter(s) IV Continuous <Continuous>  dextrose 50% Injectable 12.5 Gram(s) IV Push once  dextrose 50% Injectable 25 Gram(s) IV Push once  dextrose 50% Injectable 25 Gram(s) IV Push once  ferrous    sulfate 325 milliGRAM(s) Oral two times a day  folic acid 1 milliGRAM(s) Oral daily  heparin  Injectable 5000 Unit(s) SubCutaneous every 12 hours  levothyroxine 100 MICROGram(s) Oral daily  methadone    Tablet 80 milliGRAM(s) Oral every 6 hours  metroNIDAZOLE  IVPB 500 milliGRAM(s) IV Intermittent every 8 hours  mupirocin 2% Ointment 1 Application(s) Topical two times a day  oxybutynin 10 milliGRAM(s) Oral two times a day  pantoprazole    Tablet 40 milliGRAM(s) Oral every 12 hours  sodium bicarbonate 650 milliGRAM(s) Oral two times a day  vancomycin  IVPB      vancomycin  IVPB 750 milliGRAM(s) IV Intermittent every 24 hours    PRN MEDICATIONS  acetaminophen   Tablet .. 650 milliGRAM(s) Oral every 6 hours PRN  dextrose 40% Gel 15 Gram(s) Oral once PRN  diazepam    Tablet 5 milliGRAM(s) Oral two times a day PRN  glucagon  Injectable 1 milliGRAM(s) IntraMuscular once PRN  oxyCODONE    IR 20 milliGRAM(s) Oral every 8 hours PRN    VITALS:   T(F): 97.7  HR: 95  BP: 130/85  RR: 20  SpO2: 97%    LABS:                        10.5   9.06  )-----------( 229      ( 05 Dec 2019 20:54 )             33.0     12-05    134<L>  |  99  |  42<H>  ----------------------------<  31<LL>  3.9   |  23  |  3.6<H>    Ca    8.0<L>      05 Dec 2019 20:54  Phos  4.9     12-04  Mg     1.7     12-04    TPro  4.6<L>  /  Alb  1.5<L>  /  TBili  <0.2  /  DBili  x   /  AST  18  /  ALT  <5  /  AlkPhos  160<H>  12-05    PT/INR - ( 04 Dec 2019 21:58 )   PT: 17.40 sec;   INR: 1.52 ratio         PTT - ( 04 Dec 2019 21:58 )  PTT:38.9 sec              RADIOLOGY:    < from: Xray Kidney Ureter Bladder (12.05.19 @ 20:15) >    Impression:    Gaseous distention of several loops of large and small bowel. Findings   are nonspecific and may reflect ileus versus obstruction.    < end of copied text >      PHYSICAL EXAM:  GENERAL: NAD, lying in bed comfortably  HEAD:  Atraumatic, Normocephalic  EYES: conjunctiva and sclera clear  ENT: Moist mucous membranes  CHEST/LUNG: Clear to auscultation bilaterally  HEART: Regular rate and rhythm; No murmurs  ABDOMEN: Bowel sounds present; Soft, Nontender, Nondistended. Suprapubic catheter noted, draining normal urine.  EXTREMITIES:  R sided BKA noted, new left sided BKA ( dressing c/d/i)  NERVOUS SYSTEM:  Alert & Oriented X3, speech clear. No deficits

## 2019-12-06 NOTE — CONSULT NOTE ADULT - ASSESSMENT
A 59-year-old male with a PMH of HTN, CKD stage IV, hypothyroidism, PVD s/p right AKA and left BKA, functional paraplegia s/p suprapubic catheter, and opioid dependence who presented with coldness sensation and pain in his LLE.  He has been experiencing episodes of hypoglycemia for the past 3 months.    1. Hypoglycemic episodes  - DDx includes nutrition-related vs. chronic illness vs. methadone side effect  - HbA1c is 5.5%  - low FS readings have been ~20-30s    2. Hypothyroidism  - TSH is 72; free T4 is 54  - Synthroid dose increased from 50 mcg to 100 mcg daily    3. Vitamin D deficiency  - 25-OH-vitamin D level is <5  - patient currently taking Cholecalciferol 4000 units daily and calcium carbonate 500 mg TID A 59-year-old male with a PMH of HTN, CKD stage IV, hypothyroidism, PVD s/p right AKA and left BKA, functional paraplegia s/p suprapubic catheter, and opioid dependence who presented with coldness sensation and pain in his LLE.  He has been experiencing episodes of hypoglycemia for the past 3 months.    1. Hypoglycemic episodes  - DDx includes nutrition-related vs. chronic illness vs. methadone side effect  - HbA1c is 5.5%  - low FS readings have been ~20-30s    2. Hypothyroidism  - TSH is 72; free T4 is 54  - Synthroid dose increased from 50 mcg to 100 mcg daily    3. Vitamin D deficiency  - 25-OH-vitamin D level is <5  - patient currently taking Cholecalciferol 4000 units daily and calcium carbonate 500 mg TID    Attending: patient re-interviewed, exam confirmed.  60 yo man with recurrent (although asymptomatic) hypoglycemia.  Pt remarkably malnourished (low albumin, vitamin D, iron) and that is the likely etiology.  Need to r/o insulin mediated hypoglycemia and counter-regulatory hormone deficiencies.  Suggest Nutrition consult, draw bloods (insulin level, c-peptide, proinsulin, cortisol, growth hormone, basic metabolic panel) WHEN the patient is hypoglycemic.    Vitamin D repletion better done with Vitamin D2 50,000 IU qweek for the next 6-8 weeks.    Thyroid issue as noted.    Will follow.

## 2019-12-06 NOTE — PROGRESS NOTE ADULT - ATTENDING COMMENTS
58 yo M PMHx of PVD s/p Right AKA, paraplegia with multiple chronic bedsores and chronic  suprapubic catheter, HTN (not on any medication), CKD stage IV (not following with nephrology), hypothyroidism, opioid dependance, recent admission due to unresponsiveness, presents for 3-4 days of left foot coldness, pain and worsening ulcers. Pt found to have gangrene and is s/p amputation    Chronic left foot peripheral artery disease + dry gangrene  - Patient with chronic wound of left foot with gangrenous changes.   - Doppler US showed Moderate disease in the left SFA, No flow noted in the left posterior tibial artery   - Patient was not on any antiplatelets or statin.   - Evaluated by vascular surgery, s/p left BKA   - Post op care as per Surgical team recommendations.  - ID consult appreciated Vancomycin 7500 mg iv q24h, Check trough, Rocephin 2 gm iv q24h, Flagyl 500 mg iv q8h  - Off loading  - pt still pending wound closure.    #Recurrent Hypoglycemias:  Per Endocrine, send tests to r/o counter-reulatory hormone deficiency disorders or extraneous insulin production on 12/6 WHEN he was actually hypoglycemic.   Possible differentials: ongoing infection vs poor appetite vs high dose pain meds vs insulinoma??   Now maintain sugar with D5NS @ 75.     Dysphagia  AXR shows dilated bowel loops   Do stat CT Abdo to r/o obstruction.   D/D: Ileus/Gastroparesis. ?2/2 high dose opioids?     Given the 50lb weight loss in last year, questionable ascending colon wall thickness in september 2019 CT Abdo, long time smoker, never had colonscopy - need to discuss with GI EGD/ colonoscopy.      Hypotension  - resolved    Anemia due to Iron deficiency and chronic disease  - s/p 1 unit pRBC 11/27/19    - h/h remains low stable, started on PO iron tx.  - folate- borderline low and vitamin B 12 level- normal  - received 1U PRBC preoperatively    SILVINA on CKD stage 4 + HAGMA  -avoid nephrotoxic agents  - nephrology consult appreciated  - likely prerenal vs ATN from severe hypotension  - as per nephro: no need for venofer as iron saturation in the 30 range, will start LINDY once off ATB   - c/w current sodium bicarb    HTN  - now normotensive  -continue to monitor VS    Opioid dependance  - Patient on methadone 80 mg QID + Oxycontin 60 mg QID  - as per pain management consult- recommended to decrease Oxycodone dose to 20 mg  every 4 hours as needed for breakthrough pain  - can increase dose from TID back to QID prn  - can change tylenol to standing as per pain mgmt    Chronic sacral bedsore.  - Followed by Dr. Gooden    Hypothyroidism  - severe uncontrolled  - patient was on levothyroxine 50 mcg daily, increased to 100 MCG po once daily , will need to repeat thyroid profile in 2 weeks.  - PTO abx, thyroglobulin level, - unremarkable  US thyroid- no nodules      Severe Vitamin D deficiency- on vitamin D tx.  will changeto 50,000 units/wk after he can swallow better.     Hypomagnesmia  - repleted  - follow up AM level    Progress Note Handoff:  Pending (specify):  surgery, EGD  Family discussion: discussed pain control, surgery, dysphagia with pt and family at bedside  Disposition: Home___/SNF___/Other________/Unknown at this time__x______ .

## 2019-12-06 NOTE — PROGRESS NOTE ADULT - ASSESSMENT
Mr. Booker is a 58 yo male patient with PMHx of PVD s/p R AKA, paraplegia with multiple chronic bedsores and suprapubic catheter, HTN (not on any medication), CKD stage IV (not following with nephrology), hypothyroidism, opioid dependance, recent admission due to unresponsiveness, presents for 3-4 days of left foot coldness, pain and worsening ulcers.      # Chronic left foot peripheral artery disease + dry gangrene  - Patient with chronic wound of left foot with gangrenous changes.   - Doppler US showed Moderate disease in the left SFA, No flow noted in the left posterior tibial artery   - Patient was not on any antiplatelets or statin.   - Echo results 55-60% , grade 1 pritesh dysfunction  - will keep on maintenance fluids, please hold off if O2 sat <92 percent  - S/p BKA on 12/2/2019, schedule for closure on 12/5/2019 canceled due to hypoglycemia, rescheduled for 12/10    # Hypoglycemia  - hypoglycemia any-operatively  - on D5 NS 75 cc/hour, pt with low baseline po intake  - Endo consulted for persistent hypoglycemia  - remarkably malnourished (low albumin, vitamin D, iron) and that is the likely etiology.   - Need to r/o insulin mediated hypoglycemia and counter-regulatory hormone deficiencies. Suggest Nutrition consult  - (insulin level, c-peptide, proinsulin, cortisol, growth hormone, basic metabolic panel) drawn when patient hypoglycemic  - D5 NS 75 cc/hour restarted shortly after by senior    # Poor PO intake, difficulty swallowing  - Patient with occasional difficulty swallowing at home  - dysphagia is not pharyngeal   - Speech and Swallow: need to follow up with GI  - pt not a candidate for Barium esophagram per Radiology  - cannot tolerate PO contrast without projectile vomiting  - KUB: gasesous distenstion of several loops of large and small bowel : Ileus versus obstruction  - f/u CT abdomen/pelvis done without contrast due to poor kidney function  - GI: Pt would benefits from EGD. After hypoglycemia corrected and after surgery next week, if pt is agreeable will consider EGD later next week  - will make PPI BID, colposcopy as outpatient      # Hypothyroidism  - will need to re-check thyroid in 2 weeks ( week of 12/15)  - antibody work up sent: - thyroglobulin and thyroperoxidase negative thus far  - Thyroid ultrasounds: negative  - levothyroxine to 100 mcg daily    -# Sacral OM and gangrene  - (tachycardia + leukocytosis) on admission  - made Valium PRN due to low bp  - vancomycin 750 mg IV q12  - Flagyl 500 mg iv q8h  - Rocephin  - follow ID recs    # SILVINA on CKD + HAGMA  - non- oliguric  - Patient agreed to have temporary dialysis during inpatient stay only, or following with nephrologist.   - Patient with serum Creatinine  - Patient now agreeing to dialysis  - Consider LINDY and Epogen  - on sodium bicarb oral 650 mg bid  - can increase if Anion gap increases  - f/u Phos, f/u Mag, f/u bmp    # Normocytic anemia  - Iron deficiency and CKD,  multifactorial  - s/p 1 unit pRBC on 11/27  - Patient denies any melena, or hematochezia, noted history of hemorrhoids  - Will trend CBC    # HTN  - not on any medication  - off Amlodipine    # Opioid dependance  - Patient on methadone 80 mg QID +  - Start Oxycodone IR 20mg PO Q4hrs PRN   - s/p Narcan on 11/28  - high risk for overdose given CKD  - appreciate pain management recs as per Dr. Jackson    # Folate acid deficiency  - on folic acid po qd    # Vitamin D def  - 4000 qd daily    # Chronic sacral bedsore.  - Followed by Dr. Gooden    Handoff:  Surgery Rescheduled for 12/10. Follow up hypoglycemia work up, Resumed d5% 1/2 NS. Please monitor fingers ticks, encourage PO intake. Follow up CT and GI regarding endoscopy. Follow up with ID regarding antibiotics

## 2019-12-07 NOTE — PROGRESS NOTE ADULT - SUBJECTIVE AND OBJECTIVE BOX
S: Could not tolerate any meals. Still +N/V  No abdo pain. NO APPETITE.   Having BMs.     All other pertinent ROS negative.      12-06-19 @ 07:01  -  12-07-19 @ 07:00  --------------------------------------------------------  IN: 975 mL / OUT: 3025 mL / NET: -2050 mL      Vital Signs Last 24 Hrs  T(C): 35.6 (07 Dec 2019 05:10), Max: 36.4 (06 Dec 2019 20:46)  T(F): 96.1 (07 Dec 2019 05:10), Max: 97.6 (06 Dec 2019 20:46)  HR: 70 (07 Dec 2019 05:10) (70 - 78)  BP: 148/82 (07 Dec 2019 05:10) (124/71 - 148/82)  BP(mean): --  RR: 16 (07 Dec 2019 05:10) (14 - 16)  SpO2: 98% (07 Dec 2019 05:10) (98% - 98%)  PHYSICAL EXAM:    Constitutional: NAD, awake and alert, well-developed  HEENT: PERR, EOMI, Normal Hearing, MMM  Neck: Soft and supple, No LAD, No JVD  Respiratory: Breath sounds are clear bilaterally, No wheezing, rales or rhonchi  Cardiovascular: S1 and S2, regular rate and rhythm, no Murmurs, gallops or rubs  Gastrointestinal: Bowel Sounds present, soft, nontender, nondistended, no guarding, no rebound  Extremities: amputations noted.    MEDICATIONS:  MEDICATIONS  (STANDING):  ascorbic acid 500 milliGRAM(s) Oral daily  calcium acetate 667 milliGRAM(s) Oral three times a day with meals  calcium carbonate    500 mG (Tums) Chewable 1 Tablet(s) Chew every 8 hours  cefTRIAXone   IVPB 2000 milliGRAM(s) IV Intermittent every 24 hours  chlorhexidine 4% Liquid 1 Application(s) Topical <User Schedule>  dextrose 5% + sodium chloride 0.9%. 1000 milliLiter(s) (75 mL/Hr) IV Continuous <Continuous>  dextrose 50% Injectable 12.5 Gram(s) IV Push once  dextrose 50% Injectable 25 Gram(s) IV Push once  dextrose 50% Injectable 25 Gram(s) IV Push once  ergocalciferol 74693 Unit(s) Oral every week  ferrous    sulfate 325 milliGRAM(s) Oral two times a day  folic acid 1 milliGRAM(s) Oral daily  heparin  Injectable 5000 Unit(s) SubCutaneous every 12 hours  levothyroxine 100 MICROGram(s) Oral daily  methadone    Tablet 80 milliGRAM(s) Oral every 6 hours  metroNIDAZOLE  IVPB 500 milliGRAM(s) IV Intermittent every 8 hours  mupirocin 2% Ointment 1 Application(s) Topical two times a day  oxybutynin 10 milliGRAM(s) Oral two times a day  pantoprazole    Tablet 40 milliGRAM(s) Oral every 12 hours  sodium bicarbonate 650 milliGRAM(s) Oral two times a day  vancomycin  IVPB      vancomycin  IVPB 750 milliGRAM(s) IV Intermittent every 24 hours      LABS: All Labs Reviewed:                        10.7   9.32  )-----------( 201      ( 07 Dec 2019 06:24 )             34.3     12-07    136  |  100  |  39<H>  ----------------------------<  60<L>  4.3   |  24  |  3.5<H>    Ca    7.9<L>      07 Dec 2019 06:24  Phos  4.4     12-07  Mg     1.7     12-07    TPro  4.5<L>  /  Alb  1.5<L>  /  TBili  <0.2  /  DBili  x   /  AST  16  /  ALT  <5  /  AlkPhos  164<H>  12-07          Blood Culture:     Radiology: reviewed

## 2019-12-07 NOTE — PROGRESS NOTE ADULT - ASSESSMENT
60 yo M PMHx of PVD s/p Right AKA, paraplegia with multiple chronic bedsores and chronic  suprapubic catheter, HTN (not on any medication), CKD stage IV (not following with nephrology), hypothyroidism, opioid dependance, recent admission due to unresponsiveness, presents for 3-4 days of left foot coldness, pain and worsening ulcers. Pt found to have gangrene and is s/p amputation    Chronic left foot peripheral artery disease + dry gangrene  - Patient with chronic wound of left foot with gangrenous changes.   - Doppler US showed Moderate disease in the left SFA, No flow noted in the left posterior tibial artery   - Patient was not on any antiplatelets or statin.   - Evaluated by vascular surgery, s/p left BKA   - still pending wound closure.  - ID consult appreciated Vancomycin 7500 mg iv q24h, Check trough, Rocephin 2 gm iv q24h, Flagyl 500 mg iv q8h  - Off loading      #Recurrent Hypoglycemias:  Per Endocrine, sent tests to r/o counter-reulatory hormone deficiency disorders or extraneous insulin production on 12/6 WHEN he was actually hypoglycemic.   Possible differentials: ongoing infection vs poor appetite vs high dose pain meds vs insulinoma??   Now maintain sugar with D5NS @ 75.   Most likely cause is him not eating.    #Malnutrition:  reports loss of appetite.   Imaging shows distended bowel loops but no obstruction. Bowel sounds heard. Passing BMs/gas.  After d/w RD, willing to try ensure juice and pudding.   will try reglan TID.  IF doesn't help, try marinol.  IF that doesn't help, discuss with Dr. Huerta about PPN to replenish his glycogen stores.   meanwhile, D5NS @ 75.    #Dysphagia  GI planning EGD in coming week.    Given the 50lb weight loss in last year, questionable ascending colon wall thickness in september 2019 CT Abdo, long time smoker, never had colonscopy - needs colonoscopy outpatient.    Hypotension  - resolved    Anemia due to Iron deficiency and chronic disease  - s/p 1 unit pRBC 11/27/19    - h/h remains low stable, started on PO iron tx.  - folate- borderline low and vitamin B 12 level- normal  - received 1U PRBC preoperatively    SILVINA on CKD stage 4 + HAGMA  -avoid nephrotoxic agents  - nephrology consult appreciated  - likely prerenal vs ATN from severe hypotension  - as per nephro: no need for venofer as iron saturation in the 30 range, will start LINDY once off ATB   - c/w current sodium bicarb    HTN  - now normotensive  -continue to monitor VS    Opioid dependance  - Patient on methadone 80 mg QID + Oxycontin 60 mg QID  - as per pain management consult- recommended to decrease Oxycodone dose to 20 mg  every 4 hours as needed for breakthrough pain  - can increase dose from TID back to QID prn  - can change tylenol to standing as per pain mgmt    Chronic sacral bedsore.  - Followed by Dr. Gooden    Hypothyroidism  - severe uncontrolled  - patient was on levothyroxine 50 mcg daily, increased to 100 MCG po once daily , will need to repeat thyroid profile in 2 weeks.  - PTO abx, thyroglobulin level, - unremarkable  US thyroid- no nodules      Severe Vitamin D deficiency- on vitamin D tx.  will changeto 50,000 units/wk after he can swallow better.     Hypomagnesmia  - repleted  - follow up AM level    Progress Note Handoff:  Pending (specify):  surgery, EGD  Family discussion: discussed pain control, surgery, dysphagia with pt and family at bedside  Disposition: Home___/SNF___/Other________/Unknown at this time__x______ .

## 2019-12-07 NOTE — PROGRESS NOTE ADULT - SUBJECTIVE AND OBJECTIVE BOX
seen and examined  no distress  no new complaints         PAST HISTORY  --------------------------------------------------------------------------------  No significant changes to PMH, PSH, FHx, SHx, unless otherwise noted    ALLERGIES & MEDICATIONS  --------------------------------------------------------------------------------  Allergies    sulfamethizole (Unknown)        Standing Inpatient Medications  ascorbic acid 500 milliGRAM(s) Oral daily  calcium acetate 667 milliGRAM(s) Oral three times a day with meals  calcium carbonate    500 mG (Tums) Chewable 1 Tablet(s) Chew every 8 hours  cefTRIAXone   IVPB 2000 milliGRAM(s) IV Intermittent every 24 hours  chlorhexidine 4% Liquid 1 Application(s) Topical <User Schedule>  cholecalciferol 4000 Unit(s) Oral daily  dextrose 5% + sodium chloride 0.9%. 1000 milliLiter(s) IV Continuous <Continuous>  dextrose 50% Injectable 12.5 Gram(s) IV Push once  dextrose 50% Injectable 25 Gram(s) IV Push once  dextrose 50% Injectable 25 Gram(s) IV Push once  ferrous    sulfate 325 milliGRAM(s) Oral two times a day  folic acid 1 milliGRAM(s) Oral daily  heparin  Injectable 5000 Unit(s) SubCutaneous every 12 hours  levothyroxine 100 MICROGram(s) Oral daily  methadone    Tablet 80 milliGRAM(s) Oral every 6 hours  metroNIDAZOLE  IVPB 500 milliGRAM(s) IV Intermittent every 8 hours  mupirocin 2% Ointment 1 Application(s) Topical two times a day  oxybutynin 10 milliGRAM(s) Oral two times a day  pantoprazole    Tablet 40 milliGRAM(s) Oral every 12 hours  sodium bicarbonate 650 milliGRAM(s) Oral two times a day  vancomycin  IVPB      vancomycin  IVPB 750 milliGRAM(s) IV Intermittent every 24 hours    PRN Inpatient Medications  acetaminophen   Tablet .. 650 milliGRAM(s) Oral every 6 hours PRN  dextrose 40% Gel 15 Gram(s) Oral once PRN  diazepam    Tablet 5 milliGRAM(s) Oral two times a day PRN  glucagon  Injectable 1 milliGRAM(s) IntraMuscular once PRN  oxyCODONE    IR 20 milliGRAM(s) Oral every 4 hours PRN        VITALS/PHYSICAL EXAM  --------------------------------------------------------------------------------  T(C): 35.6 (12-07-19 @ 05:10), Max: 36.5 (12-06-19 @ 13:21)  HR: 70 (12-07-19 @ 05:10) (70 - 95)  BP: 148/82 (12-07-19 @ 05:10) (124/71 - 148/82)  RR: 16 (12-07-19 @ 05:10) (14 - 20)  SpO2: 98% (12-07-19 @ 05:10) (97% - 98%)  Wt(kg): --        12-06-19 @ 07:01  -  12-07-19 @ 07:00  --------------------------------------------------------  IN: 975 mL / OUT: 3025 mL / NET: -2050 mL      Physical Exam:  	Gen: NAD  	Pulm: CTA B/L  	CV:  S1S2; no rub  	Abd: +distended  	: SPC   	LE:     LABS/STUDIES  --------------------------------------------------------------------------------              10.7   9.32  >-----------<  201      [12-07-19 @ 06:24]              34.3     136  |  96  |  41  ----------------------------<  52      [12-06-19 @ 15:52]  4.2   |  24  |  3.8        Ca     7.8     [12-06-19 @ 15:52]    TPro  4.7  /  Alb  1.5  /  TBili  <0.2  /  DBili  x   /  AST  17  /  ALT  <5  /  AlkPhos  172  [12-06-19 @ 15:52]          Creatinine Trend:  SCr 3.8 [12-06 @ 15:52]  SCr 3.6 [12-05 @ 20:54]  SCr 3.9 [12-04 @ 21:58]  SCr 4.0 [12-04 @ 06:19]  SCr 3.8 [12-03 @ 07:15]    Urinalysis - [11-27-19 @ 11:58]      Color Light Yellow / Appearance Slightly Turbid / SG 1.007 / pH 6.5      Gluc Trace / Ketone Negative  / Bili Negative / Urobili <2 mg/dL       Blood Trace / Protein 300 mg/dL / Leuk Est Large / Nitrite Positive      RBC 1 / WBC 59 / Hyaline 4 / Gran  / Sq Epi  / Non Sq Epi 1 / Bacteria Many      Iron 27, TIBC 89, %sat 30      [11-27-19 @ 06:17]  Ferritin 679      [11-27-19 @ 06:17]  PTH -- (Ca 7.8)      [11-28-19 @ 06:29]   99  PTH -- (Ca 7.0)      [10-14-19 @ 19:45]   209  PTH -- (Ca 7.8)      [07-02-19 @ 10:54]   71  Vitamin D (25OH) <5      [10-14-19 @ 19:45]  HbA1c 5.5      [06-27-19 @ 06:35]  TSH 72.80      [11-29-19 @ 07:32] seen and examined  no distress  no new complaints         PAST HISTORY  --------------------------------------------------------------------------------  No significant changes to PMH, PSH, FHx, SHx, unless otherwise noted    ALLERGIES & MEDICATIONS  --------------------------------------------------------------------------------  Allergies    sulfamethizole (Unknown)        Standing Inpatient Medications  ascorbic acid 500 milliGRAM(s) Oral daily  calcium acetate 667 milliGRAM(s) Oral three times a day with meals  calcium carbonate    500 mG (Tums) Chewable 1 Tablet(s) Chew every 8 hours  cefTRIAXone   IVPB 2000 milliGRAM(s) IV Intermittent every 24 hours  chlorhexidine 4% Liquid 1 Application(s) Topical <User Schedule>  cholecalciferol 4000 Unit(s) Oral daily  dextrose 5% + sodium chloride 0.9%. 1000 milliLiter(s) IV Continuous <Continuous>  dextrose 50% Injectable 12.5 Gram(s) IV Push once  dextrose 50% Injectable 25 Gram(s) IV Push once  dextrose 50% Injectable 25 Gram(s) IV Push once  ferrous    sulfate 325 milliGRAM(s) Oral two times a day  folic acid 1 milliGRAM(s) Oral daily  heparin  Injectable 5000 Unit(s) SubCutaneous every 12 hours  levothyroxine 100 MICROGram(s) Oral daily  methadone    Tablet 80 milliGRAM(s) Oral every 6 hours  metroNIDAZOLE  IVPB 500 milliGRAM(s) IV Intermittent every 8 hours  mupirocin 2% Ointment 1 Application(s) Topical two times a day  oxybutynin 10 milliGRAM(s) Oral two times a day  pantoprazole    Tablet 40 milliGRAM(s) Oral every 12 hours  sodium bicarbonate 650 milliGRAM(s) Oral two times a day  vancomycin  IVPB      vancomycin  IVPB 750 milliGRAM(s) IV Intermittent every 24 hours    PRN Inpatient Medications  acetaminophen   Tablet .. 650 milliGRAM(s) Oral every 6 hours PRN  dextrose 40% Gel 15 Gram(s) Oral once PRN  diazepam    Tablet 5 milliGRAM(s) Oral two times a day PRN  glucagon  Injectable 1 milliGRAM(s) IntraMuscular once PRN  oxyCODONE    IR 20 milliGRAM(s) Oral every 4 hours PRN        VITALS/PHYSICAL EXAM  --------------------------------------------------------------------------------  T(C): 35.6 (12-07-19 @ 05:10), Max: 36.5 (12-06-19 @ 13:21)  HR: 70 (12-07-19 @ 05:10) (70 - 95)  BP: 148/82 (12-07-19 @ 05:10) (124/71 - 148/82)  RR: 16 (12-07-19 @ 05:10) (14 - 20)  SpO2: 98% (12-07-19 @ 05:10) (97% - 98%)  Wt(kg): --        12-06-19 @ 07:01  -  12-07-19 @ 07:00  --------------------------------------------------------  IN: 975 mL / OUT: 3025 mL / NET: -2050 mL      Physical Exam:  	Gen: NAD  	Pulm: CTA B/L  	CV:  S1S2; no rub  	Abd: +distended  	: SPC   	LE: b/l amputations     LABS/STUDIES  --------------------------------------------------------------------------------              10.7   9.32  >-----------<  201      [12-07-19 @ 06:24]              34.3     136  |  96  |  41  ----------------------------<  52      [12-06-19 @ 15:52]  4.2   |  24  |  3.8        Ca     7.8     [12-06-19 @ 15:52]    TPro  4.7  /  Alb  1.5  /  TBili  <0.2  /  DBili  x   /  AST  17  /  ALT  <5  /  AlkPhos  172  [12-06-19 @ 15:52]          Creatinine Trend:  SCr 3.8 [12-06 @ 15:52]  SCr 3.6 [12-05 @ 20:54]  SCr 3.9 [12-04 @ 21:58]  SCr 4.0 [12-04 @ 06:19]  SCr 3.8 [12-03 @ 07:15]    Urinalysis - [11-27-19 @ 11:58]      Color Light Yellow / Appearance Slightly Turbid / SG 1.007 / pH 6.5      Gluc Trace / Ketone Negative  / Bili Negative / Urobili <2 mg/dL       Blood Trace / Protein 300 mg/dL / Leuk Est Large / Nitrite Positive      RBC 1 / WBC 59 / Hyaline 4 / Gran  / Sq Epi  / Non Sq Epi 1 / Bacteria Many      Iron 27, TIBC 89, %sat 30      [11-27-19 @ 06:17]  Ferritin 679      [11-27-19 @ 06:17]  PTH -- (Ca 7.8)      [11-28-19 @ 06:29]   99  PTH -- (Ca 7.0)      [10-14-19 @ 19:45]   209  PTH -- (Ca 7.8)      [07-02-19 @ 10:54]   71  Vitamin D (25OH) <5      [10-14-19 @ 19:45]  HbA1c 5.5      [06-27-19 @ 06:35]  TSH 72.80      [11-29-19 @ 07:32]

## 2019-12-07 NOTE — PROGRESS NOTE ADULT - ASSESSMENT
59/M with SILVINA on CKD 4 (baseline creat 3.8 mg% Oct 2019) SPC, severe PVD, presented with cold Lt LE and ulcers, found to have severe anemia Hb 6.8 and acidosis.  # non oliguric  # creatinine stable   # last ph at goal, on binders, PTH noted, no need for 1-25 vit D   # h/h noted, no need for venofer sat in the 30 range, no need for LINDY   #ID notes appreciated on vanco rocephin and flagyl , last vanco level 8.3, please repeat   # patient with significant decrease in GFR in view of low muscle mass ( amputations)  # continue sodium bicarbonate po   # patient is refusing hd , no acute indication   # last TSH in nov 72, please repeat on synthroid   #will follow

## 2019-12-08 NOTE — PROGRESS NOTE ADULT - ATTENDING COMMENTS
58 yo M PMHx of PVD s/p Right AKA, paraplegia with multiple chronic bedsores and chronic  suprapubic catheter, HTN (not on any medication), CKD stage IV (not following with nephrology), hypothyroidism, opioid dependance, recent admission due to unresponsiveness, presents for 3-4 days of left foot coldness, pain and worsening ulcers. Pt found to have gangrene and is s/p amputation    Chronic left foot peripheral artery disease + dry gangrene  - Patient with chronic wound of left foot with gangrenous changes.   - Doppler US showed Moderate disease in the left SFA, No flow noted in the left posterior tibial artery   - Patient was not on any antiplatelets or statin.   - Evaluated by vascular surgery, s/p left BKA   - still pending wound closure. VAscular is cleared to close the wound since now sugars are being maintained on D5NS. May change this to D10NS any-operatively?  - ID consult appreciated Vancomycin 7500 mg iv q24h, Check trough, Rocephin 2 gm iv q24h, Flagyl 500 mg iv q8h  - Off loading      #Recurrent Hypoglycemias:  Per Endocrine, sent tests to r/o counter-regulatory hormone deficiency disorders or extraneous insulin production on 12/6 WHEN he was actually hypoglycemic.   Possible differentials: ongoing infection vs poor appetite vs high dose pain meds vs insulinoma??   Now maintain sugar with D5NS @ 75.   Most likely cause is him not eating.    #Malnutrition:  reports loss of appetite.   Imaging shows distended bowel loops but no obstruction. Bowel sounds heard. Passing BMs/gas.  After d/w RD, willing to try ensure juice and pudding.   will try reglan TID.  IF doesn't help, try marinol.  IF that doesn't help, discuss with Dr. Huerta about PPN to replenish his glycogen stores.   meanwhile, D5NS @ 75.    #Dysphagia  GI planning EGD in coming week.    Given the 50lb weight loss in last year, questionable ascending colon wall thickness in september 2019 CT Abdo, long time smoker, never had colonscopy - needs colonoscopy outpatient.    Hypotension  - resolved    Anemia due to Iron deficiency and chronic disease  - s/p 1 unit pRBC 11/27/19    - h/h remains low stable, started on PO iron tx.  - folate- borderline low and vitamin B 12 level- normal  - received 1U PRBC preoperatively    SILVINA on CKD stage 4 + HAGMA  -avoid nephrotoxic agents  - nephrology consult appreciated  - likely prerenal vs ATN from severe hypotension  - as per nephro: no need for venofer as iron saturation in the 30 range, will start LINDY once off ATB   - c/w current sodium bicarb    HTN  - now normotensive  -continue to monitor VS    Opioid dependance  - Patient on methadone 80 mg QID + Oxycontin 60 mg QID  - as per pain management consult- recommended to decrease Oxycodone dose to 20 mg  every 4 hours as needed for breakthrough pain  - can increase dose from TID back to QID prn  - can change tylenol to standing as per pain mgmt    Chronic sacral bedsore.  - Followed by Dr. Gooden    Hypothyroidism  - severe uncontrolled  - patient was on levothyroxine 50 mcg daily, increased to 100 MCG po once daily , will need to repeat thyroid profile in 2 weeks.  - PTO abx, thyroglobulin level, - unremarkable  US thyroid- no nodules      Severe Vitamin D deficiency- on vitamin D tx.  will changeto 50,000 units/wk after he can swallow better.     Hypomagnesmia  - repleted  - follow up AM level    Progress Note Handoff:  Pending (specify):  surgery, EGD  Family discussion: discussed pain control, surgery, dysphagia with pt and family at bedside  Disposition: Home___/SNF___/Other________/Unknown at this time__x______ .

## 2019-12-08 NOTE — PROGRESS NOTE ADULT - SUBJECTIVE AND OBJECTIVE BOX
[facility] FOLLOW UP NOTE  --------------------------------------------------------------------------------  Chief Complaint:    24 hour events/subjective:        PAST HISTORY  --------------------------------------------------------------------------------  No significant changes to PMH, PSH, FHx, SHx, unless otherwise noted    ALLERGIES & MEDICATIONS  --------------------------------------------------------------------------------  Allergies    sulfamethizole (Unknown)    Intolerances      Standing Inpatient Medications  ascorbic acid 500 milliGRAM(s) Oral daily  calcium acetate 667 milliGRAM(s) Oral three times a day with meals  calcium carbonate    500 mG (Tums) Chewable 1 Tablet(s) Chew every 8 hours  cefTRIAXone   IVPB 2000 milliGRAM(s) IV Intermittent every 24 hours  chlorhexidine 4% Liquid 1 Application(s) Topical <User Schedule>  dextrose 5% + sodium chloride 0.9%. 1000 milliLiter(s) IV Continuous <Continuous>  dextrose 50% Injectable 12.5 Gram(s) IV Push once  dextrose 50% Injectable 25 Gram(s) IV Push once  dextrose 50% Injectable 25 Gram(s) IV Push once  ergocalciferol 62683 Unit(s) Oral every week  ferrous    sulfate 325 milliGRAM(s) Oral two times a day  folic acid 1 milliGRAM(s) Oral daily  heparin  Injectable 5000 Unit(s) SubCutaneous every 12 hours  levothyroxine 100 MICROGram(s) Oral daily  methadone    Tablet 80 milliGRAM(s) Oral every 6 hours  metoclopramide Injectable 5 milliGRAM(s) IV Push every 6 hours  metroNIDAZOLE  IVPB 500 milliGRAM(s) IV Intermittent every 8 hours  mupirocin 2% Ointment 1 Application(s) Topical two times a day  oxybutynin 10 milliGRAM(s) Oral two times a day  pantoprazole    Tablet 40 milliGRAM(s) Oral every 12 hours  sodium bicarbonate 650 milliGRAM(s) Oral two times a day  vancomycin  IVPB      vancomycin  IVPB 750 milliGRAM(s) IV Intermittent every 24 hours    PRN Inpatient Medications  acetaminophen   Tablet .. 650 milliGRAM(s) Oral every 6 hours PRN  dextrose 40% Gel 15 Gram(s) Oral once PRN  diazepam    Tablet 5 milliGRAM(s) Oral two times a day PRN  glucagon  Injectable 1 milliGRAM(s) IntraMuscular once PRN  oxyCODONE    IR 20 milliGRAM(s) Oral every 4 hours PRN      REVIEW OF SYSTEMS  --------------------------------------------------------------------------------  Gen: No weight changes, fatigue, fevers/chills, weakness  Skin: No rashes  Head/Eyes/Ears/Mouth: No headache; Normal hearing; Normal vision w/o blurriness; No sinus pain/discomfort, sore throat  Respiratory: No dyspnea, cough, wheezing, hemoptysis  CV: No chest pain, PND, orthopnea  GI: No abdominal pain, diarrhea, constipation, nausea, vomiting, melena, hematochezia  : No increased frequency, dysuria, hematuria, nocturia  MSK: No joint pain/swelling; no back pain; no edema  Neuro: No dizziness/lightheadedness, weakness, seizures, numbness, tingling  Heme: No easy bruising or bleeding  Endo: No heat/cold intolerance  Psych: No significant nervousness, anxiety, stress, depression    All other systems were reviewed and are negative, except as noted.    VITALS/PHYSICAL EXAM  --------------------------------------------------------------------------------  T(C): 36.7 (12-08-19 @ 05:08), Max: 37.1 (12-07-19 @ 22:14)  HR: 74 (12-08-19 @ 05:08) (74 - 80)  BP: 142/68 (12-08-19 @ 05:08) (134/81 - 142/68)  RR: 18 (12-08-19 @ 05:08) (18 - 18)  SpO2: --  Wt(kg): --        12-07-19 @ 07:01  -  12-08-19 @ 07:00  --------------------------------------------------------  IN: 0 mL / OUT: 2500 mL / NET: -2500 mL      Physical Exam:  	Gen: NAD, well-appearing  	HEENT: PERRL, supple neck, clear oropharynx  	Pulm: CTA B/L  	CV: RRR, S1S2; no rub  	Back: No spinal or CVA tenderness; no sacral edema  	Abd: +BS, soft, nontender/nondistended  	: No suprapubic tenderness  	UE: Warm, FROM, no clubbing, intact strength; no edema; no asterixis  	LE: Warm, FROM, no clubbing, intact strength; no edema  	Neuro: No focal deficits, intact gait  	Psych: Normal affect and mood  	Skin: Warm, without rashes  	Vascular access:    LABS/STUDIES  --------------------------------------------------------------------------------              9.5    8.86  >-----------<  163      [12-08-19 @ 07:56]              30.2     137  |  105  |  39  ----------------------------<  85      [12-08-19 @ 07:56]  4.1   |  21  |  3.4        Ca     7.5     [12-08-19 @ 07:56]      Mg     1.8     [12-08-19 @ 07:56]      Phos  4.0     [12-08-19 @ 07:56]    TPro  4.1  /  Alb  1.4  /  TBili  <0.2  /  DBili  x   /  AST  12  /  ALT  <5  /  AlkPhos  152  [12-08-19 @ 07:56]          Creatinine Trend:  SCr 3.4 [12-08 @ 07:56]  SCr 3.5 [12-07 @ 06:24]  SCr 3.8 [12-06 @ 15:52]  SCr 3.6 [12-05 @ 20:54]  SCr 3.9 [12-04 @ 21:58]    Urinalysis - [11-27-19 @ 11:58]      Color Light Yellow / Appearance Slightly Turbid / SG 1.007 / pH 6.5      Gluc Trace / Ketone Negative  / Bili Negative / Urobili <2 mg/dL       Blood Trace / Protein 300 mg/dL / Leuk Est Large / Nitrite Positive      RBC 1 / WBC 59 / Hyaline 4 / Gran  / Sq Epi  / Non Sq Epi 1 / Bacteria Many      Iron 27, TIBC 89, %sat 30      [11-27-19 @ 06:17]  Ferritin 679      [11-27-19 @ 06:17]  PTH -- (Ca 7.8)      [11-28-19 @ 06:29]   99  PTH -- (Ca 7.0)      [10-14-19 @ 19:45]   209  PTH -- (Ca 7.8)      [07-02-19 @ 10:54]   71  Vitamin D (25OH) <5      [10-14-19 @ 19:45]  HbA1c 5.5      [06-27-19 @ 06:35]  TSH 72.80      [11-29-19 @ 07:32]

## 2019-12-08 NOTE — PROGRESS NOTE ADULT - ASSESSMENT
Mr. Booker is a 58 yo male patient with PMHx of PVD s/p R AKA, paraplegia with multiple chronic bedsores and suprapubic catheter, HTN (not on any medication), CKD stage IV (not following with nephrology), hypothyroidism, opioid dependance, recent admission due to unresponsiveness, presents for 3-4 days of left foot coldness, pain and worsening ulcers.    # Chronic left foot peripheral artery disease + dry gangrene  - Patient with chronic wound of left foot with gangrenous changes.   - Doppler US showed Moderate disease in the left SFA, No flow noted in the left posterior tibial artery   - Patient was not on any antiplatelets or statin.   - Echo results 55-60% , grade 1 pritesh dysfunction  - will keep on maintenance fluids, please hold off if O2 sat <92 percent  - S/p BKA on 12/2/2019, schedule for closure on 12/5/2019 canceled due to hypoglycemia, rescheduled for 12/10    # Hypoglycemia  - hypoglycemia any-operatively  - on D5 NS 75 cc/hour, pt with low baseline po intake  - Endo consulted for persistent hypoglycemia  - remarkably malnourished (low albumin, vitamin D, iron) and that is the likely etiology.   - Need to r/o insulin mediated hypoglycemia and counter-regulatory hormone Suggest Nutrition consult  - (insulin level, c-peptide, proinsulin, cortisol, growth hormone, ) drawn when patient hypoglycemic  - Patient now willing to try marinol or a similar apetitie stimulant    # Poor PO intake, difficulty swallowing  - Patient with occasional difficulty swallowing at home  - dysphagia is not pharyngeal   - Speech and Swallow: need to follow up with GI  - pt not a candidate for Barium esophagram per Radiology  - cannot tolerate PO contrast without projectile vomiting  - KUB: gasesous distenstion of several loops of large and small bowel : Ileus versus obstruction  - CT abdomen/pelvis done without contrast due to poor kidney function shows no mass, some urinary bladder thickening and a chronic decubitus ulcer.  - GI: Pt would benefits from EGD. After hypoglycemia corrected and after surgery next week, if pt is agreeable will consider EGD later next week  - will make PPI BID, colonoscopy as outpatient    # Hypothyroidism  - will need to re-check thyroid in 2 weeks ( week of 12/15)  - antibody work up sent: - thyroglobulin and thyroperoxidase negative thus far  - Thyroid ultrasounds: negative  - levothyroxine to 100 mcg daily    -# Sacral OM and gangrene  - (tachycardia + leukocytosis) on admission  - made Valium PRN due to low bp  - vancomycin 750 mg IV q24  - Flagyl 500 mg iv q8h  - Rocephin  - follow ID recs    # SILVINA on CKD + HAGMA  - non- oliguric  - Patient agreed to have temporary dialysis during inpatient stay only, or following with nephrologist.   - Patient with serum Creatinine  - Patient now agreeing to dialysis  - Consider LINDY and Epogen  - on sodium bicarb oral 650 mg bid  - can increase if Anion gap increases  - f/u Phos, f/u Mag, f/u bmp    # Normocytic anemia  - Iron deficiency and CKD,  multifactorial  - s/p 1 unit pRBC on 11/27  - Patient denies any melena, or hematochezia, noted history of hemorrhoids  - Will trend CBC    # HTN  - not on any medication  - off Amlodipine    # Opioid dependance  - Patient on methadone 80 mg QID +  - Start Oxycodone IR 20mg PO Q4hrs PRN   - s/p Narcan on 11/28  - high risk for overdose given CKD  - appreciate pain management recs as per Dr. Jackson    # Folate acid deficiency  - on folic acid po qd    # Vitamin D def  - 4000 qd daily    # Chronic sacral bedsore.  - Followed by Dr. Gooden    Handoff:  Surgery Rescheduled for 12/10, note patient becomes rapidly hypoglycemic ( closure was canceled this past week for hypoglycemia despite being on dextrose). Follow up hypoglycemia work up, consider appetite stimulant. . Please monitor fingers ticks, encourage PO intake. Follow up GI regarding endoscopy. Follow up with vanco level in am, and follow up ID recs

## 2019-12-08 NOTE — PROGRESS NOTE ADULT - SUBJECTIVE AND OBJECTIVE BOX
SUBJECTIVE:       Today is hospital day 12d. This morning he is resting comfortably in bed and reports no new issues or overnight events.     PAST MEDICAL & SURGICAL HISTORY  Anemia  PAD (peripheral artery disease)  Hypertension  Suprapubic catheter  Pressure ulcer  Diabetes  Lumbar compression fracture  S/P below knee amputation, right  S/P debridement  Toe amputation status, right  H/O hernia repair    ALLERGIES:  sulfamethizole (Unknown)    MEDICATIONS:  STANDING MEDICATIONS  ascorbic acid 500 milliGRAM(s) Oral daily  calcium acetate 667 milliGRAM(s) Oral three times a day with meals  calcium carbonate    500 mG (Tums) Chewable 1 Tablet(s) Chew every 8 hours  cefTRIAXone   IVPB 2000 milliGRAM(s) IV Intermittent every 24 hours  chlorhexidine 4% Liquid 1 Application(s) Topical <User Schedule>  dextrose 5% + sodium chloride 0.9%. 1000 milliLiter(s) IV Continuous <Continuous>  dextrose 50% Injectable 12.5 Gram(s) IV Push once  dextrose 50% Injectable 25 Gram(s) IV Push once  dextrose 50% Injectable 25 Gram(s) IV Push once  ergocalciferol 93262 Unit(s) Oral every week  ferrous    sulfate 325 milliGRAM(s) Oral two times a day  folic acid 1 milliGRAM(s) Oral daily  heparin  Injectable 5000 Unit(s) SubCutaneous every 12 hours  levothyroxine 100 MICROGram(s) Oral daily  methadone    Tablet 80 milliGRAM(s) Oral every 6 hours  metoclopramide Injectable 5 milliGRAM(s) IV Push every 6 hours  metroNIDAZOLE  IVPB 500 milliGRAM(s) IV Intermittent every 8 hours  mupirocin 2% Ointment 1 Application(s) Topical two times a day  oxybutynin 10 milliGRAM(s) Oral two times a day  pantoprazole    Tablet 40 milliGRAM(s) Oral every 12 hours  sodium bicarbonate 650 milliGRAM(s) Oral two times a day  vancomycin  IVPB      vancomycin  IVPB 750 milliGRAM(s) IV Intermittent every 24 hours    PRN MEDICATIONS  acetaminophen   Tablet .. 650 milliGRAM(s) Oral every 6 hours PRN  dextrose 40% Gel 15 Gram(s) Oral once PRN  diazepam    Tablet 5 milliGRAM(s) Oral two times a day PRN  glucagon  Injectable 1 milliGRAM(s) IntraMuscular once PRN  oxyCODONE    IR 20 milliGRAM(s) Oral every 4 hours PRN    VITALS:   T(F): 98  HR: 74  BP: 142/68  RR: 18  SpO2: --    LABS:                        9.5    8.86  )-----------( 163      ( 08 Dec 2019 07:56 )             30.2     12-08    137  |  105  |  39<H>  ----------------------------<  85  4.1   |  21  |  3.4<H>    Ca    7.5<L>      08 Dec 2019 07:56  Phos  4.0     12-08  Mg     1.8     12-08    TPro  4.1<L>  /  Alb  1.4<L>  /  TBili  <0.2  /  DBili  x   /  AST  12  /  ALT  <5  /  AlkPhos  152<H>  12-08        PHYSICAL EXAM:  < from: CT Abdomen and Pelvis No Cont (12.06.19 @ 20:09) >    IMPRESSION:     No intra-abdominal or pelvic mass is identified. Study is limited without   intravenous contrast.    Urinary bladder wall thickening      Chronic decubitus ulcers.       < end of copied text >

## 2019-12-08 NOTE — PROGRESS NOTE ADULT - ASSESSMENT
CKd 4 , creatinine 3.2 no RRT needed at this point   hemoglobin 9.5 stable   PVD severe   PT depressed should be seen by Psych

## 2019-12-09 NOTE — PROGRESS NOTE ADULT - SUBJECTIVE AND OBJECTIVE BOX
seen and examined  no distress  decrease po intake        PAST HISTORY  --------------------------------------------------------------------------------  No significant changes to PMH, PSH, FHx, SHx, unless otherwise noted    ALLERGIES & MEDICATIONS  --------------------------------------------------------------------------------  Allergies    sulfamethizole (Unknown)          Standing Inpatient Medications  ascorbic acid 500 milliGRAM(s) Oral daily  calcium acetate 667 milliGRAM(s) Oral three times a day with meals  calcium carbonate    500 mG (Tums) Chewable 1 Tablet(s) Chew every 8 hours  cefTRIAXone   IVPB 2000 milliGRAM(s) IV Intermittent every 24 hours  chlorhexidine 4% Liquid 1 Application(s) Topical <User Schedule>  dextrose 5% + sodium chloride 0.9%. 1000 milliLiter(s) IV Continuous <Continuous>  dextrose 50% Injectable 12.5 Gram(s) IV Push once  dextrose 50% Injectable 25 Gram(s) IV Push once  dextrose 50% Injectable 25 Gram(s) IV Push once  ergocalciferol 63753 Unit(s) Oral every week  ferrous    sulfate 325 milliGRAM(s) Oral two times a day  folic acid 1 milliGRAM(s) Oral daily  heparin  Injectable 5000 Unit(s) SubCutaneous every 12 hours  levothyroxine 100 MICROGram(s) Oral daily  methadone    Tablet 80 milliGRAM(s) Oral every 6 hours  metoclopramide Injectable 5 milliGRAM(s) IV Push every 6 hours  metroNIDAZOLE  IVPB 500 milliGRAM(s) IV Intermittent every 8 hours  mupirocin 2% Ointment 1 Application(s) Topical two times a day  oxybutynin 10 milliGRAM(s) Oral two times a day  pantoprazole    Tablet 40 milliGRAM(s) Oral every 12 hours  sodium bicarbonate 650 milliGRAM(s) Oral two times a day    PRN Inpatient Medications  acetaminophen   Tablet .. 650 milliGRAM(s) Oral every 6 hours PRN  dextrose 40% Gel 15 Gram(s) Oral once PRN  diazepam    Tablet 5 milliGRAM(s) Oral two times a day PRN  glucagon  Injectable 1 milliGRAM(s) IntraMuscular once PRN  oxyCODONE    IR 20 milliGRAM(s) Oral every 4 hours PRN      VITALS/PHYSICAL EXAM  --------------------------------------------------------------------------------  T(C): 36.6 (12-09-19 @ 04:58), Max: 36.6 (12-09-19 @ 04:58)  HR: 84 (12-09-19 @ 04:58) (80 - 84)  BP: 129/67 (12-09-19 @ 04:58) (116/84 - 134/66)  RR: 18 (12-09-19 @ 04:58) (18 - 18)  SpO2: --  Wt(kg): --        12-08-19 @ 07:01  -  12-09-19 @ 07:00  --------------------------------------------------------  IN: 0 mL / OUT: 2400 mL / NET: -2400 mL      Physical Exam:  	Gen: NAD  	Pulm: CTA B/L  	CV:  S1S2; no rub  	Abd: +distended  	: SPC   	LE:      LABS/STUDIES  --------------------------------------------------------------------------------              9.8    8.82  >-----------<  168      [12-09-19 @ 06:28]              31.9     138  |  106  |  39  ----------------------------<  60      [12-09-19 @ 06:28]  4.1   |  20  |  3.6        Ca     7.6     [12-09-19 @ 06:28]      Mg     1.7     [12-09-19 @ 06:28]      Phos  4.0     [12-09-19 @ 06:28]    TPro  4.3  /  Alb  1.3  /  TBili  <0.2  /  DBili  x   /  AST  11  /  ALT  <5  /  AlkPhos  155  [12-09-19 @ 06:28]          Creatinine Trend:  SCr 3.6 [12-09 @ 06:28]  SCr 3.4 [12-08 @ 07:56]  SCr 3.5 [12-07 @ 06:24]  SCr 3.8 [12-06 @ 15:52]  SCr 3.6 [12-05 @ 20:54]    Urinalysis - [11-27-19 @ 11:58]      Color Light Yellow / Appearance Slightly Turbid / SG 1.007 / pH 6.5      Gluc Trace / Ketone Negative  / Bili Negative / Urobili <2 mg/dL       Blood Trace / Protein 300 mg/dL / Leuk Est Large / Nitrite Positive      RBC 1 / WBC 59 / Hyaline 4 / Gran  / Sq Epi  / Non Sq Epi 1 / Bacteria Many      Iron 27, TIBC 89, %sat 30      [11-27-19 @ 06:17]  Ferritin 679      [11-27-19 @ 06:17]  PTH -- (Ca 7.8)      [11-28-19 @ 06:29]   99  PTH -- (Ca 7.0)      [10-14-19 @ 19:45]   209  PTH -- (Ca 7.8)      [07-02-19 @ 10:54]   71  Vitamin D (25OH) <5      [10-14-19 @ 19:45]  HbA1c 5.5      [06-27-19 @ 06:35]  TSH 72.80      [11-29-19 @ 07:32] seen and examined  no distress  decrease po intake        PAST HISTORY  --------------------------------------------------------------------------------  No significant changes to PMH, PSH, FHx, SHx, unless otherwise noted    ALLERGIES & MEDICATIONS  --------------------------------------------------------------------------------  Allergies    sulfamethizole (Unknown)          Standing Inpatient Medications  ascorbic acid 500 milliGRAM(s) Oral daily  calcium acetate 667 milliGRAM(s) Oral three times a day with meals  calcium carbonate    500 mG (Tums) Chewable 1 Tablet(s) Chew every 8 hours  cefTRIAXone   IVPB 2000 milliGRAM(s) IV Intermittent every 24 hours  chlorhexidine 4% Liquid 1 Application(s) Topical <User Schedule>  dextrose 5% + sodium chloride 0.9%. 1000 milliLiter(s) IV Continuous <Continuous>  dextrose 50% Injectable 12.5 Gram(s) IV Push once  dextrose 50% Injectable 25 Gram(s) IV Push once  dextrose 50% Injectable 25 Gram(s) IV Push once  ergocalciferol 49236 Unit(s) Oral every week  ferrous    sulfate 325 milliGRAM(s) Oral two times a day  folic acid 1 milliGRAM(s) Oral daily  heparin  Injectable 5000 Unit(s) SubCutaneous every 12 hours  levothyroxine 100 MICROGram(s) Oral daily  methadone    Tablet 80 milliGRAM(s) Oral every 6 hours  metoclopramide Injectable 5 milliGRAM(s) IV Push every 6 hours  metroNIDAZOLE  IVPB 500 milliGRAM(s) IV Intermittent every 8 hours  mupirocin 2% Ointment 1 Application(s) Topical two times a day  oxybutynin 10 milliGRAM(s) Oral two times a day  pantoprazole    Tablet 40 milliGRAM(s) Oral every 12 hours  sodium bicarbonate 650 milliGRAM(s) Oral two times a day    PRN Inpatient Medications  acetaminophen   Tablet .. 650 milliGRAM(s) Oral every 6 hours PRN  dextrose 40% Gel 15 Gram(s) Oral once PRN  diazepam    Tablet 5 milliGRAM(s) Oral two times a day PRN  glucagon  Injectable 1 milliGRAM(s) IntraMuscular once PRN  oxyCODONE    IR 20 milliGRAM(s) Oral every 4 hours PRN      VITALS/PHYSICAL EXAM  --------------------------------------------------------------------------------  T(C): 36.6 (12-09-19 @ 04:58), Max: 36.6 (12-09-19 @ 04:58)  HR: 84 (12-09-19 @ 04:58) (80 - 84)  BP: 129/67 (12-09-19 @ 04:58) (116/84 - 134/66)  RR: 18 (12-09-19 @ 04:58) (18 - 18)  SpO2: --  Wt(kg): --        12-08-19 @ 07:01  -  12-09-19 @ 07:00  --------------------------------------------------------  IN: 0 mL / OUT: 2400 mL / NET: -2400 mL      Physical Exam:  	Gen: NAD  	Pulm: CTA B/L  	CV:  S1S2; no rub  	Abd: +distended  	: SPC   	LE:b/l bka       LABS/STUDIES  --------------------------------------------------------------------------------              9.8    8.82  >-----------<  168      [12-09-19 @ 06:28]              31.9     138  |  106  |  39  ----------------------------<  60      [12-09-19 @ 06:28]  4.1   |  20  |  3.6        Ca     7.6     [12-09-19 @ 06:28]      Mg     1.7     [12-09-19 @ 06:28]      Phos  4.0     [12-09-19 @ 06:28]    TPro  4.3  /  Alb  1.3  /  TBili  <0.2  /  DBili  x   /  AST  11  /  ALT  <5  /  AlkPhos  155  [12-09-19 @ 06:28]          Creatinine Trend:  SCr 3.6 [12-09 @ 06:28]  SCr 3.4 [12-08 @ 07:56]  SCr 3.5 [12-07 @ 06:24]  SCr 3.8 [12-06 @ 15:52]  SCr 3.6 [12-05 @ 20:54]    Urinalysis - [11-27-19 @ 11:58]      Color Light Yellow / Appearance Slightly Turbid / SG 1.007 / pH 6.5      Gluc Trace / Ketone Negative  / Bili Negative / Urobili <2 mg/dL       Blood Trace / Protein 300 mg/dL / Leuk Est Large / Nitrite Positive      RBC 1 / WBC 59 / Hyaline 4 / Gran  / Sq Epi  / Non Sq Epi 1 / Bacteria Many      Iron 27, TIBC 89, %sat 30      [11-27-19 @ 06:17]  Ferritin 679      [11-27-19 @ 06:17]  PTH -- (Ca 7.8)      [11-28-19 @ 06:29]   99  PTH -- (Ca 7.0)      [10-14-19 @ 19:45]   209  PTH -- (Ca 7.8)      [07-02-19 @ 10:54]   71  Vitamin D (25OH) <5      [10-14-19 @ 19:45]  HbA1c 5.5      [06-27-19 @ 06:35]  TSH 72.80      [11-29-19 @ 07:32]

## 2019-12-09 NOTE — PROGRESS NOTE ADULT - ASSESSMENT
59/M with SILVINA on CKD 4 (baseline creat 3.8 mg% Oct 2019) SPC, severe PVD, presented with cold Lt LE and ulcers, found to have severe anemia Hb 6.8 and acidosis.  # non oliguric  # creatinine stable   # ph 4 at goal ,on binders, PTH noted, no need for 1-25 vit D   # h/h noted, no need for venofer sat in the 30 range, no need for LINDY will start once h <9  #ID notes appreciated on  rocephin and flagyl   # patient with significant decrease in GFR in view of low muscle mass ( amputations)  # continue sodium bicarbonate po   # patient is refusing hd , no acute indication  # episode of hypoglycemia, decrease po intake, continue IV fluids at 100 cc/h  # vascular notes appreciated, closure of BKA 12/10   # last TSH in nov 72, please repeat on synthroid   #will follow

## 2019-12-09 NOTE — PROGRESS NOTE ADULT - SUBJECTIVE AND OBJECTIVE BOX
We are following the patient for Dysphagia     GI HPI Today:  Pt still complaining of dysphagia  Hemodynamically stable   Planned for Surgery tomorrow    PAST MEDICAL & SURGICAL HISTORY  Anemia  PAD (peripheral artery disease)  Hypertension  Suprapubic catheter  Pressure ulcer  Diabetes  Lumbar compression fracture  S/P below knee amputation, right  S/P debridement  Toe amputation status, right  H/O hernia repair      ALLERGIES:  sulfamethizole (Unknown)      MEDICATIONS:  MEDICATIONS  (STANDING):  ascorbic acid 500 milliGRAM(s) Oral daily  calcium acetate 667 milliGRAM(s) Oral three times a day with meals  calcium carbonate    500 mG (Tums) Chewable 1 Tablet(s) Chew every 8 hours  cefTRIAXone   IVPB 2000 milliGRAM(s) IV Intermittent every 24 hours  chlorhexidine 4% Liquid 1 Application(s) Topical <User Schedule>  dextrose 5% + sodium chloride 0.9%. 1000 milliLiter(s) (75 mL/Hr) IV Continuous <Continuous>  dextrose 50% Injectable 12.5 Gram(s) IV Push once  dextrose 50% Injectable 25 Gram(s) IV Push once  dextrose 50% Injectable 25 Gram(s) IV Push once  ergocalciferol 84474 Unit(s) Oral every week  ferrous    sulfate 325 milliGRAM(s) Oral two times a day  folic acid 1 milliGRAM(s) Oral daily  heparin  Injectable 5000 Unit(s) SubCutaneous every 12 hours  levothyroxine 100 MICROGram(s) Oral daily  methadone    Tablet 80 milliGRAM(s) Oral every 6 hours  metoclopramide Injectable 5 milliGRAM(s) IV Push every 6 hours  metroNIDAZOLE  IVPB 500 milliGRAM(s) IV Intermittent every 8 hours  mupirocin 2% Ointment 1 Application(s) Topical two times a day  oxybutynin 10 milliGRAM(s) Oral two times a day  pantoprazole    Tablet 40 milliGRAM(s) Oral every 12 hours  sodium bicarbonate 650 milliGRAM(s) Oral two times a day    MEDICATIONS  (PRN):  acetaminophen   Tablet .. 650 milliGRAM(s) Oral every 6 hours PRN Temp greater or equal to 38C (100.4F), Mild Pain (1 - 3)  dextrose 40% Gel 15 Gram(s) Oral once PRN Blood Glucose LESS THAN 70 milliGRAM(s)/deciLiter  diazepam    Tablet 5 milliGRAM(s) Oral two times a day PRN anxiety  glucagon  Injectable 1 milliGRAM(s) IntraMuscular once PRN Glucose <70 milliGRAM(s)/deciLiter  oxyCODONE    IR 20 milliGRAM(s) Oral every 4 hours PRN Severe Pain (7 - 10)      REVIEW OF SYSTEMS  General:  No fevers  Eyes:  No reported pain   ENT:  No sore throat   NECK: No stiffness   CV:  No chest pain   Resp:  No shortness of breath  GI:  See HPI  :  No dysuria  Muscle:  ++ weakness  Neuro:  No tingling  Endocrine:  No polyuria  Heme:  No ecchymosis        VITALS:   T(F): 97.8 (12-09 @ 04:58), Max: 98.7 (12-07 @ 22:14)  HR: 84 (12-09 @ 04:58) (70 - 95)  BP: 129/67 (12-09 @ 04:58) (116/84 - 148/82)  BP(mean): --  RR: 18 (12-09 @ 04:58) (14 - 20)  SpO2: 98% (12-07 @ 05:10) (97% - 98%)        PHYSICAL EXAM:  GENERAL:  Appears in no distress  HEENT:  Conjunctivae Anicteric   CHEST:  Full & symmetric excursion  HEART:  NS1, S2,   ABDOMEN:  Soft, non-tender, non-distended,  SKIN:  No rash  NEURO:  Alert         Blood Work :                        9.8    8.82  )-----------( 168      ( 09 Dec 2019 06:28 )             31.9       12-09    138  |  106  |  39<H>  ----------------------------<  60<L>  4.1   |  20  |  3.6<H>    Ca    7.6<L>      09 Dec 2019 06:28  Phos  4.0     12-09  Mg     1.7     12-09      CBC -  ( 09 Dec 2019 06:28 )  Hemoglobin : 9.8    CBC -  ( 08 Dec 2019 07:56 )  Hemoglobin : 9.5    CBC -  ( 07 Dec 2019 06:24 )  Hemoglobin : 10.7    CBC -  ( 05 Dec 2019 20:54 )  Hemoglobin : 10.5      LIVER FUNCTIONS - ( 09 Dec 2019 06:28 )  Alb: 1.3 [3.5 - 5.2] / Pro: 4.3 [6.0 - 8.0] / ALK PHOS: 155 [30 - 115] / ALT: <5 [0 - 41] / AST: 11 [0 - 41] / GGT: x     LIVER FUNCTIONS - ( 08 Dec 2019 07:56 )  Alb: 1.4 [3.5 - 5.2] / Pro: 4.1 [6.0 - 8.0] / ALK PHOS: 152 [30 - 115] / ALT: <5 [0 - 41] / AST: 12 [0 - 41] / GGT: x     LIVER FUNCTIONS - ( 07 Dec 2019 06:24 )  Alb: 1.5 [3.5 - 5.2] / Pro: 4.5 [6.0 - 8.0] / ALK PHOS: 164 [30 - 115] / ALT: <5 [0 - 41] / AST: 16 [0 - 41] / GGT: x     LIVER FUNCTIONS - ( 06 Dec 2019 15:52 )  Alb: 1.5 [3.5 - 5.2] / Pro: 4.7 [6.0 - 8.0] / ALK PHOS: 172 [30 - 115] / ALT: <5 [0 - 41] / AST: 17 [0 - 41] / GGT: x     LIVER FUNCTIONS - ( 05 Dec 2019 20:54 )  Alb: 1.5 [3.5 - 5.2] / Pro: 4.6 [6.0 - 8.0] / ALK PHOS: 160 [30 - 115] / ALT: <5 [0 - 41] / AST: 18 [0 - 41] / GGT: x     LIVER FUNCTIONS - ( 04 Dec 2019 06:19 )  Alb: 1.4 [3.5 - 5.2] / Pro: 4.6 [6.0 - 8.0] / ALK PHOS: 160 [30 - 115] / ALT: 7 [0 - 41] / AST: 14 [0 - 41] / GGT: x     LIVER FUNCTIONS - ( 03 Dec 2019 07:15 )  Alb: 1.5 [3.5 - 5.2] / Pro: 4.5 [6.0 - 8.0] / ALK PHOS: 151 [30 - 115] / ALT: 7 [0 - 41] / AST: 11 [0 - 41] / GGT: x

## 2019-12-09 NOTE — PROGRESS NOTE ADULT - SUBJECTIVE AND OBJECTIVE BOX
S: no new events/complaints  appetitie slightly better now       All other pertinent ROS negative.      12-08-19 @ 07:01  -  12-09-19 @ 07:00  --------------------------------------------------------  IN: 0 mL / OUT: 2400 mL / NET: -2400 mL    12-09-19 @ 07:01  -  12-09-19 @ 18:11  --------------------------------------------------------  IN: 0 mL / OUT: 900 mL / NET: -900 mL      Vital Signs Last 24 Hrs  T(C): 36.8 (09 Dec 2019 12:30), Max: 36.8 (09 Dec 2019 12:30)  T(F): 98.3 (09 Dec 2019 12:30), Max: 98.3 (09 Dec 2019 12:30)  HR: 85 (09 Dec 2019 12:30) (80 - 85)  BP: 147/76 (09 Dec 2019 12:30) (116/84 - 147/76)  BP(mean): --  RR: 18 (09 Dec 2019 12:30) (18 - 18)  SpO2: 98% (09 Dec 2019 09:02) (98% - 98%)  PHYSICAL EXAM:    no change from prior    MEDICATIONS:  MEDICATIONS  (STANDING):  ascorbic acid 500 milliGRAM(s) Oral daily  calcium acetate 667 milliGRAM(s) Oral three times a day with meals  calcium carbonate    500 mG (Tums) Chewable 1 Tablet(s) Chew every 8 hours  cefTRIAXone   IVPB 2000 milliGRAM(s) IV Intermittent every 24 hours  chlorhexidine 4% Liquid 1 Application(s) Topical <User Schedule>  dextrose 5% + sodium chloride 0.9%. 1000 milliLiter(s) (75 mL/Hr) IV Continuous <Continuous>  dextrose 50% Injectable 12.5 Gram(s) IV Push once  dextrose 50% Injectable 25 Gram(s) IV Push once  dextrose 50% Injectable 25 Gram(s) IV Push once  dronabinol 2.5 milliGRAM(s) Oral two times a day  ergocalciferol 63777 Unit(s) Oral every week  ferrous    sulfate 325 milliGRAM(s) Oral two times a day  folic acid 1 milliGRAM(s) Oral daily  heparin  Injectable 5000 Unit(s) SubCutaneous every 12 hours  levothyroxine 100 MICROGram(s) Oral daily  metoclopramide Injectable 5 milliGRAM(s) IV Push every 6 hours  metroNIDAZOLE  IVPB 500 milliGRAM(s) IV Intermittent every 8 hours  mupirocin 2% Ointment 1 Application(s) Topical two times a day  oxybutynin 10 milliGRAM(s) Oral two times a day  pantoprazole    Tablet 40 milliGRAM(s) Oral every 12 hours  sodium bicarbonate 650 milliGRAM(s) Oral two times a day      LABS: All Labs Reviewed:                        9.3    9.26  )-----------( 166      ( 09 Dec 2019 16:57 )             30.4     12-09    136  |  105  |  39<H>  ----------------------------<  88  4.2   |  20  |  3.7<H>    Ca    7.6<L>      09 Dec 2019 16:57  Phos  3.9     12-09  Mg     1.5     12-09    TPro  4.3<L>  /  Alb  1.3<L>  /  TBili  <0.2  /  DBili  x   /  AST  11  /  ALT  <5  /  AlkPhos  155<H>  12-09    PT/INR - ( 09 Dec 2019 16:57 )   PT: 19.90 sec;   INR: 1.74 ratio         PTT - ( 09 Dec 2019 16:57 )  PTT:39.4 sec      Blood Culture:     Radiology: reviewed

## 2019-12-09 NOTE — PROGRESS NOTE ADULT - ASSESSMENT
59 year old male s/p left BKA 12/2 going for closure, was delayed due to hypoglycemic episodes     Plan:   - pre-op - cbc, bmp, mg, phos, type and screen, coag, cxr, ecg, npo   - OR OR for closure of BKA 12/10 59 year old male s/p left BKA 12/2 going for closure, was delayed due to hypoglycemic episodes     Plan:   - pre-op - cbc, bmp, mg, phos, type and screen, coag, cxr, ecg, npo - vascular team will order for 4pm (attempted to call [phone] @7:53)  - OR OR for closure of BKA 12/10

## 2019-12-09 NOTE — PROGRESS NOTE ADULT - ASSESSMENT
Mr. Booker is a 58 yo male patient with Pmhx of PVD s/p R AKA, paraplegia with multiple chronic bedsores and suprapubic catheter, HTN (not on any medication), CKD stage IV (not following with nephrology), hypothyroidism, opioid dependance, recent admission due to unresponsiveness, presents for 3-4 days of left foot coldness, pain and worsening ulcers.  Patient being treated for L Foot Gangrene and SILVINA. GI called for dysphagia.    # Intermittent Dysphagia to solid rule out malignancy vs esophageal web/ stricture/ zencker   Pt had foot surgery on dec 2 and he was scheduled for closure on 12/5 but developed hypoglycemia so procedure was postponed to 12/10    Patient and his wife they are worried about the EGD because of the patient infection and now with the new hypoglycemia episodes and they prefer to hold on any intervention for now   Rec:   Pt would benefits from EGD. After hypoglycemia corrected  and after surgery , if pt is agreeable and cleared medically  will consider EGD   PPI BID  Please recall GI when pt is cleared medically     # Iron deficiency anemia   Hg stable  No signs of GI bleed   Rec:   Needs EGD and colonoscopy/ VCE  that can be done as outpatient     # Hypoglycemia   Endocrinology following   Workup being done     # Foot gangrene  Being followed by vascular   Planned for surgical closure this week

## 2019-12-09 NOTE — PROGRESS NOTE ADULT - ASSESSMENT
60 yo M PMHx of PVD s/p Right AKA, paraplegia with multiple chronic bedsores and chronic  suprapubic catheter, HTN (not on any medication), CKD stage IV (not following with nephrology), hypothyroidism, opioid dependance, recent admission due to unresponsiveness, presents for 3-4 days of left foot coldness, pain and worsening ulcers. Pt found to have gangrene and is s/p amputation    Chronic left foot peripheral artery disease + dry gangrene  - Patient with chronic wound of left foot with gangrenous changes.   - Doppler US showed Moderate disease in the left SFA, No flow noted in the left posterior tibial artery   - Patient was not on any antiplatelets or statin.   - Evaluated by vascular surgery, s/p left BKA   - still pending wound closure. Cannot clear for wound closure until this adrenal insufficiency and hypoglycemias are worked up.  - ID consult appreciated  Now Vancomycin 500 mg iv q24h, Check trough again before 4th dose of this, Rocephin 2 gm iv q24h, Flagyl 500 mg iv q8h  - Off loading      #Recurrent Hypoglycemias:  Per Endocrine, sent tests to r/o counter-regulatory hormone deficiency disorders or extraneous insulin production on 12/6 WHEN he was actually hypoglycemic.   Possible differentials: ongoing infection vs poor appetite vs high dose pain meds vs insulinoma??   For Now maintain sugar with D5NS @ 75.   Patient's AM cortisol was 10 when BMP sugar was 23. This is inappropriately low.     #Suspect Adrenal insufficiency:  Needs ACTH stim test.   Request Endocrine to put in the recs.     #Malnutrition:  reports loss of appetite.   Imaging shows distended bowel loops but no obstruction. Bowel sounds heard. Passing BMs/gas.  After d/w RD, willing to try ensure juice and pudding.   will try reglan TID.  IF doesn't help, try marinol.  IF that doesn't help, discuss with Dr. Huerta about PPN to replenish his glycogen stores.   meanwhile, D5NS @ 75.    #Dysphagia  GI planning EGD outpatient.    Given the 50lb weight loss in last year, questionable ascending colon wall thickness in september 2019 CT Abdo, long time smoker, never had colonscopy - needs colonoscopy outpatient.    Hypotension  - resolved    Anemia due to Iron deficiency and chronic disease  - s/p 1 unit pRBC 11/27/19    - h/h remains low stable, started on PO iron tx.  - folate- borderline low and vitamin B 12 level- normal  - received 1U PRBC preoperatively    SILVINA on CKD stage 4 + HAGMA  -avoid nephrotoxic agents  - nephrology consult appreciated  - likely prerenal vs ATN from severe hypotension  - as per nephro: no need for venofer as iron saturation in the 30 range, will start LINDY once off ATB   - c/w current sodium bicarb    HTN  - now normotensive  -continue to monitor VS    Opioid dependance  - Patient on methadone 80 mg QID + Oxycontin 60 mg QID  - as per pain management consult- recommended to decrease Oxycodone dose to 20 mg  every 4 hours as needed for breakthrough pain  - can increase dose from TID back to QID prn  - can change tylenol to standing as per pain mgmt    Chronic sacral bedsore.  - Followed by Dr. Gooden    Hypothyroidism  - severe uncontrolled  - patient was on levothyroxine 50 mcg daily, increased to 100 MCG po once daily , will need to repeat thyroid profile in 2 weeks.  - PTO abx, thyroglobulin level, - unremarkable  US thyroid- no nodules      Severe Vitamin D deficiency- on vitamin D tx.  will changeto 50,000 units/wk after he can swallow better.     Hypomagnesmia  - repleted  - follow up AM level    Progress Note Handoff:  Pending (specify):  surgery, EGD  Family discussion: discussed pain control, surgery, dysphagia with pt and family at bedside  Disposition: Home___/SNF___/Other________/Unknown at this time__x______ .

## 2019-12-09 NOTE — PROGRESS NOTE ADULT - SUBJECTIVE AND OBJECTIVE BOX
GENERAL SURGERY PROGRESS NOTE     MARGE BELLA  59y  Male  Hospital day :13d  POD:  Procedure: Deep incision of infected bone of foot  Guillotine amputation below knee    OVERNIGHT EVENTS: no acute events, glucose better controlled, no hypoglycemia in the past 24 hours     T(F): 97.2 (12-08-19 @ 22:06), Max: 97.2 (12-08-19 @ 13:25)  HR: 80 (12-08-19 @ 22:06) (80 - 81)  BP: 116/84 (12-08-19 @ 22:06) (116/84 - 134/66)  ABP: --  ABP(mean): --  RR: 18 (12-08-19 @ 22:06) (18 - 18)  SpO2: --    DIET/FLUIDS: ascorbic acid 500 milliGRAM(s) Oral daily  calcium acetate 667 milliGRAM(s) Oral three times a day with meals  dextrose 5% + sodium chloride 0.9%. 1000 milliLiter(s) IV Continuous <Continuous>  ergocalciferol 86666 Unit(s) Oral every week  ferrous    sulfate 325 milliGRAM(s) Oral two times a day  folic acid 1 milliGRAM(s) Oral daily  sodium bicarbonate 650 milliGRAM(s) Oral two times a day       GI proph:  pantoprazole    Tablet 40 milliGRAM(s) Oral every 12 hours    AC/ proph: heparin  Injectable 5000 Unit(s) SubCutaneous every 12 hours    ABx: cefTRIAXone   IVPB 2000 milliGRAM(s) IV Intermittent every 24 hours  metroNIDAZOLE  IVPB 500 milliGRAM(s) IV Intermittent every 8 hours      PHYSICAL EXAM:  GENERAL: NAD  CHEST/LUNG: Clear to auscultation bilaterally  HEART: Regular rate and rhythm  ABDOMEN: Soft, Nontender, Nondistended;   EXTREMITIES:  b/l BKA       LABS  Labs:  CAPILLARY BLOOD GLUCOSE      POCT Blood Glucose.: 157 mg/dL (09 Dec 2019 02:37)  POCT Blood Glucose.: 79 mg/dL (08 Dec 2019 21:53)  POCT Blood Glucose.: 107 mg/dL (08 Dec 2019 17:10)  POCT Blood Glucose.: 82 mg/dL (08 Dec 2019 12:20)  POCT Blood Glucose.: 100 mg/dL (08 Dec 2019 08:03)                          9.5    8.86  )-----------( 163      ( 08 Dec 2019 07:56 )             30.2       Auto Neutrophil %: 64.8 % (12-08-19 @ 07:56)  Auto Immature Granulocyte %: 0.6 % (12-08-19 @ 07:56)    12-08    137  |  105  |  39<H>  ----------------------------<  85  4.1   |  21  |  3.4<H>      Calcium, Total Serum: 7.5 mg/dL (12-08-19 @ 07:56)      LFTs:             4.1  | <0.2 | 12       ------------------[152     ( 08 Dec 2019 07:56 )  1.4  | x    | <5          Lipase:x      Amylase:x            RADIOLOGY & ADDITIONAL TESTS: No studies since 12/6

## 2019-12-10 NOTE — PROGRESS NOTE ADULT - SUBJECTIVE AND OBJECTIVE BOX
MARGE BELLA  59y, Male    All available historical data reviewed    OVERNIGHT EVENTS:    no fevers, has no complaints  ROS:  General: Denies rigors, night sweats  HEENT: Denies headache, rhinorrhea, sore throat, eye pain  CV: Denies CP, palpitations  PULM: Denies wheezing, hemoptysis  GI: Denies hematemesis, hematochezia, melena  : Denies discharge, hematuria  MSK: Denies arthralgias, myalgias  SKIN: Denies rash, lesions  NEURO: Denies paresthesias, weakness  PSYCH: Denies depression, anxiety    VITALS:  T(F): 97.4, Max: 98.3 (12-09-19 @ 12:30)  HR: 82  BP: 124/73  RR: 18Vital Signs Last 24 Hrs  T(C): 36.3 (10 Dec 2019 05:05), Max: 36.8 (09 Dec 2019 12:30)  T(F): 97.4 (10 Dec 2019 05:05), Max: 98.3 (09 Dec 2019 12:30)  HR: 82 (10 Dec 2019 05:05) (81 - 85)  BP: 124/73 (10 Dec 2019 05:05) (124/73 - 147/76)  BP(mean): --  RR: 18 (10 Dec 2019 05:05) (18 - 18)  SpO2: 99% (10 Dec 2019 07:41) (99% - 99%)    TESTS & MEASUREMENTS:                        9.5    8.83  )-----------( 176      ( 10 Dec 2019 06:06 )             31.1     12-10    139  |  108  |  36<H>  ----------------------------<  54<L>  4.2   |  18  |  3.5<H>    Ca    7.8<L>      10 Dec 2019 06:06  Phos  3.8     12-10  Mg     1.8     12-10    TPro  4.2<L>  /  Alb  1.1<L>  /  TBili  <0.2  /  DBili  x   /  AST  12  /  ALT  <5  /  AlkPhos  150<H>  12-10    LIVER FUNCTIONS - ( 10 Dec 2019 06:06 )  Alb: 1.1 g/dL / Pro: 4.2 g/dL / ALK PHOS: 150 U/L / ALT: <5 U/L / AST: 12 U/L / GGT: x                   RADIOLOGY & ADDITIONAL TESTS:  Personal review of radiological diagnostics performed  Echo and EKG results noted when applicable.     ANTIBIOTICS:  cefTRIAXone   IVPB 2000 milliGRAM(s) IV Intermittent every 24 hours  metroNIDAZOLE  IVPB 500 milliGRAM(s) IV Intermittent every 8 hours  vancomycin  IVPB 500 milliGRAM(s) IV Intermittent daily

## 2019-12-10 NOTE — PROGRESS NOTE ADULT - ASSESSMENT
· Assessment		  Mr. Booker is a 60 yo male patient with Pmhx of PVD s/p R AKA, paraplegia with multiple chronic bedsores and suprapubic catheter, HTN (not on any medication), CKD stage IV (not following with nephrology), hypothyroidism, opioid dependance, recent admission due to unresponsiveness, presents for 3-4 days of left foot coldness, pain and worsening ulcers.  Patient went today to see Dr. Gooden who sent patient to ED. He noted that he has had left foot ulcers for the last 5 years, course was waxing and waning and was stable with dressing. Lately he felt his wound is getting worse and his foot felt cold. Denies any fever, chills. Patient denying any cp or sob. No GI or  complaints.  In ED, patient afebrile, tachycardic hypertensive. He was given 2L of LR, and one dose of vancomycin. Patient was seen by vascular surgery and arterial doppler US of the right LE was done.     IMPRESSION:  # Severe PAD with ischemic LLE  -s/p L BKA 12/2. Closure planned  -s/p Right BKA  -WBC 8.8  # Chronic sacral OM with cellulitis  -vancomycin level 8.1. Dosis changed to 750 mg iv q24h      RECOMMENDATIONS:  f/u with burn/vascular  Vancomycin 750 mg iv q24h  Check trough   Rocephin 2 gm iv q24h  Flagyl 500 mg iv q8h  Off loading  ABx till closure of BKA. 48h post closure could hold all ABX  recall prn please

## 2019-12-10 NOTE — PROGRESS NOTE ADULT - ASSESSMENT
Mr. Booker is a 58 yo male patient with PMHx of PVD s/p Right AKA months ago, paraplegia with multiple chronic bedsores and suprapubic catheter, HTN (not on any medication), CKD stage IV (not following with nephrology), hypothyroidism, opioid dependance, recent admission due to unresponsiveness, presents for 3-4 days of left foot coldness, pain and worsening ulcers.    # Chronic left foot peripheral artery disease + dry gangrene  - S/p BKA on 12/2/2019, schedule for closure on 12/5/2019 canceled due to hypoglycemia, ending rescheduling as Pt is not stable (hypoglycemic work up pending from Endocrinology)   - Doppler US showed Moderate disease in the left SFA, No flow noted in the left posterior tibial artery   - will keep on maintenance fluids, please hold off if O2 sat <92 percent  - Pending wound cultures. Now on Vancomycin 500 mg iv q24h, Check trough again before 4th dose of this, Rocephin 2 gm iv q24h, Flagyl 500 mg iv q8h    # Hypoglycemia  - hypoglycemia any-operatively  - on D5 NS 75 cc/hour, pt with low baseline po intake  - Endo consulted for persistent hypoglycemia  - Etiology remarkably malnourished (low albumin, vitamin D, iron) Vs Methadone induced. Nutrition conuslt placed  - Need to r/o insulin mediated hypoglycemia and counter-regulatory hormone.   - (insulin level, c-peptide, proinsulin, cortisol, growth hormone, ) drawn when patient hypoglycemic  - Started merinol  - Cannot clear unless adrenalinsufficency and hypoglycemia worked up    # Poor PO intake, difficulty swallowing  - Patient with occasional difficulty swallowing at home  - dysphagia is not pharyngeal   - Speech and Swallow: need to follow up with GI  - pt not a candidate for Barium esophagram per Radiology  - cannot tolerate PO contrast without projectile vomiting  - KUB: gasesous distenstion of several loops of large and small bowel : Ileus versus obstruction  - CT abdomen/pelvis done without contrast due to poor kidney function shows no mass, some urinary bladder thickening and a chronic decubitus ulcer.  - GI: Pt would benefits from EGD. After hypoglycemia corrected and after surgery, will do Outpatient  - will make PPI BID, colonoscopy as outpatient    # Hypothyroidism  - will need to re-check thyroid in 2 weeks ( week of 12/15)  - antibody work up sent: - thyroglobulin and thyroperoxidase negative thus far  - Thyroid ultrasounds: negative  - levothyroxine to 100 mcg daily    -# Sacral OM and gangrene  - (tachycardia + leukocytosis) on admission  - made Valium PRN due to low bp  - vancomycin 500 mg IV q24  - Flagyl 500 mg iv q8h  - Rocephin  - follow ID recs    # SILVINA on CKD + HAGMA  - non- oliguric  - Patient agreed to have temporary dialysis during inpatient stay only, or following with nephrologist.   - Patient with serum Creatinine  - Patient now agreeing to dialysis  - Consider LINDY and Epogen  - on sodium bicarb oral 650 mg bid  - can increase if Anion gap increases  - f/u Phos, f/u Mag, f/u bmp    # Normocytic anemia  - Iron deficiency and CKD,  multifactorial  - s/p 1 unit pRBC on 11/27  - Patient denies any melena, or hematochezia, noted history of hemorrhoids  - Will trend CBC    # HTN  - not on any medication  - off Amlodipine  Echo results 55-60% , grade 1 pritesh dysfunction    # Opioid dependance  - Patient on methadone 80 mg QID +  - Start Oxycodone IR 20mg PO Q4hrs PRN   - s/p Narcan on 11/28  - high risk for overdose given CKD  - appreciate pain management recs as per Dr. Jackson    # Folate acid deficiency  - on folic acid po qd    # Vitamin D def  - 4000 qd daily    # Chronic sacral bedsore.  - Followed by Dr. Gooden Mr. Booker is a 60 yo male patient with PMHx of PVD s/p Right AKA months ago, paraplegia with multiple chronic bedsores and suprapubic catheter, HTN (not on any medication), CKD stage IV (not following with nephrology), hypothyroidism, opioid dependance, recent admission due to unresponsiveness, presents for 3-4 days of left foot coldness, pain and worsening ulcers.    # Chronic left foot peripheral artery disease + dry gangrene  - S/p BKA on 12/2/2019, schedule for closure on 12/5/2019 canceled due to hypoglycemia, ending rescheduling as Pt is not stable (hypoglycemic work up pending from Endocrinology)   - Doppler US showed Moderate disease in the left SFA, No flow noted in the left posterior tibial artery   - will keep on maintenance fluids, please hold off if O2 sat <92 percent  - Pending wound cultures. Now on Vancomycin 500 mg iv q24h, Check trough again before 4th dose of this, Rocephin 2 gm iv q24h, Flagyl 500 mg iv q8h  - ABx till closure of BKA. 48h post closure could hold all ABX    # Hypoglycemia  - hypoglycemia any-operatively  - on D5 NS 75 cc/hour, pt with low baseline po intake  - Endo consulted for persistent hypoglycemia  - Etiology remarkably malnourished (low albumin, vitamin D, iron) Vs Methadone induced. Nutrition conuslt placed  - Need to r/o insulin mediated hypoglycemia and counter-regulatory hormone.   - (insulin level, c-peptide, proinsulin, cortisol, growth hormone, ) drawn when patient hypoglycemic  - Started merinol  - Cannot clear unless adrenal insufficency and hypoglycemia worked up  - Tomorrow will gona do Low dose ACTH supression test     # Poor PO intake, difficulty swallowing  - Patient with occasional difficulty swallowing at home  - dysphagia is not pharyngeal   - Speech and Swallow: need to follow up with GI  - pt not a candidate for Barium esophagram per Radiology  - cannot tolerate PO contrast without projectile vomiting  - KUB: gasesous distenstion of several loops of large and small bowel : Ileus versus obstruction  - CT abdomen/pelvis done without contrast due to poor kidney function shows no mass, some urinary bladder thickening and a chronic decubitus ulcer.  - GI: Pt would benefits from EGD. After hypoglycemia corrected and after surgery, will do Outpatient  - will make PPI BID, colonoscopy as outpatient    # Hypothyroidism  - will need to re-check thyroid in 2 weeks ( week of 12/15)  - antibody work up sent: - thyroglobulin and thyroperoxidase negative thus far  - Thyroid ultrasounds: negative  - levothyroxine to 100 mcg daily    -# Sacral OM and gangrene  - (tachycardia + leukocytosis) on admission  - made Valium PRN due to low bp  - vancomycin 500 mg IV q24  - Flagyl 500 mg iv q8h  - Rocephin  - follow ID recs    # SILVINA on CKD + HAGMA  - non- oliguric  - Patient agreed to have temporary dialysis during inpatient stay only, or following with nephrologist.   - Patient with serum Creatinine  - Patient now agreeing to dialysis  - Consider LINDY and Epogen  - increase  sodium bicarbonate po  to q 8   - patient is refusing hd , no acute indication  - can increase if Anion gap increases  - f/u Phos, f/u Mag, f/u bmp    # Normocytic anemia  - Iron deficiency and CKD,  multifactorial  - s/p 1 unit pRBC on 11/27  - Patient denies any melena, or hematochezia, noted history of hemorrhoids  - Will trend CBC    # HTN  - not on any medication  - off Amlodipine  Echo results 55-60% , grade 1 pritesh dysfunction    # Opioid dependance  - Patient on methadone 80 mg QID +  - Start Oxycodone IR 20mg PO Q4hrs PRN   - s/p Narcan on 11/28  - high risk for overdose given CKD  - appreciate pain management recs as per Dr. Jackson    # Folate acid deficiency  - on folic acid po qd    # Vitamin D def  - 4000 qd daily    # Chronic sacral bedsore.  - Followed by Dr. Gooden Mr. Booker is a 58 yo male patient with PMHx of PVD s/p Right AKA months ago, paraplegia with multiple chronic bedsores and suprapubic catheter, HTN (not on any medication), CKD stage IV (not following with nephrology), hypothyroidism, opioid dependance, recent admission due to unresponsiveness, presents for 3-4 days of left foot coldness, pain and worsening ulcers.    # Chronic left foot peripheral artery disease + dry gangrene  - S/p BKA on 12/2/2019, schedule for closure on 12/5/2019 canceled due to hypoglycemia, pending rescheduling as Pt is not stable (hypoglycemic work up pending from Endocrinology)   - Cannot medically clear for closure of BKA unless adrenal insufficency and hypoglycemia worked up  - Doppler US showed Moderate disease in the left SFA, No flow noted in the left posterior tibial artery   - will keep on maintenance fluids, please hold off if O2 sat <92 percent  - Pending wound cultures. Now on Vancomycin 500 mg iv q24h, Check trough again before 4th dose of this, Rocephin 2 gm iv q24h, Flagyl 500 mg iv q8h  - ABx till closure of BKA. 48h post closure could hold all ABX    # Hypoglycemia  - hypoglycemia any-operatively  - on D5 NS 75 cc/hour, pt with low baseline po intake  - Endo consulted for persistent hypoglycemia  - Etiology remarkably malnourished (low albumin, vitamin D, iron) Vs Methadone induced. Nutrition conuslt placed  - Need to r/o insulin mediated hypoglycemia and counter-regulatory hormone.   - (insulin level, c-peptide, proinsulin, cortisol, growth hormone, ) drawn when patient hypoglycemic  - Started merinol  - Cannot clear for closure of BKA unless adrenal insufficency and hypoglycemia worked up  - Tomorrow will gona do Low dose ACTH supression test     # Poor PO intake, difficulty swallowing  - Patient with occasional difficulty swallowing at home  - dysphagia is not pharyngeal   - Speech and Swallow: need to follow up with GI  - pt not a candidate for Barium esophagram per Radiology  - cannot tolerate PO contrast without projectile vomiting  - KUB: gasesous distenstion of several loops of large and small bowel : Ileus versus obstruction  - CT abdomen/pelvis done without contrast due to poor kidney function shows no mass, some urinary bladder thickening and a chronic decubitus ulcer.  - GI: Pt would benefits from EGD. After hypoglycemia corrected and after surgery, will do Outpatient  - will make PPI BID, colonoscopy as outpatient    # Hypothyroidism  - will need to re-check thyroid in 2 weeks ( week of 12/15)  - antibody work up sent: - thyroglobulin and thyroperoxidase negative thus far  - Thyroid ultrasounds: negative  - levothyroxine to 100 mcg daily    -# Sacral OM and gangrene  - (tachycardia + leukocytosis) on admission  - made Valium PRN due to low bp  - vancomycin 500 mg IV q24  - Flagyl 500 mg iv q8h  - Rocephin  - follow ID recs    # SILVINA on CKD + HAGMA  - non- oliguric  - Patient agreed to have temporary dialysis during inpatient stay only, or following with nephrologist.   - Patient with serum Creatinine  - Patient now agreeing to dialysis  - Consider LINDY and Epogen  - increase  sodium bicarbonate po  to q 8   - patient is refusing hd , no acute indication  - can increase if Anion gap increases  - f/u Phos, f/u Mag, f/u bmp    # Normocytic anemia  - Iron deficiency and CKD,  multifactorial  - s/p 1 unit pRBC on 11/27  - Patient denies any melena, or hematochezia, noted history of hemorrhoids  - Will trend CBC    # HTN  - not on any medication  - off Amlodipine  Echo results 55-60% , grade 1 pritesh dysfunction    # Opioid dependance  - Patient on methadone 80 mg QID +  - Start Oxycodone IR 20mg PO Q4hrs PRN   - s/p Narcan on 11/28  - high risk for overdose given CKD  - appreciate pain management recs as per Dr. Jackson    # Folate acid deficiency  - on folic acid po qd    # Vitamin D def  - 4000 qd daily    # Chronic sacral bedsore.  - Followed by Dr. Gooden

## 2019-12-10 NOTE — PROGRESS NOTE ADULT - SUBJECTIVE AND OBJECTIVE BOX
SEEN AND EXAMINED  REMAINS WITH DECREASE PO INTAKE         PAST HISTORY  --------------------------------------------------------------------------------  No significant changes to PMH, PSH, FHx, SHx, unless otherwise noted    ALLERGIES & MEDICATIONS  --------------------------------------------------------------------------------  Allergies    sulfamethizole (Unknown)        Standing Inpatient Medications  ascorbic acid 500 milliGRAM(s) Oral daily  calcium acetate 667 milliGRAM(s) Oral three times a day with meals  calcium carbonate    500 mG (Tums) Chewable 1 Tablet(s) Chew every 8 hours  cefTRIAXone   IVPB 2000 milliGRAM(s) IV Intermittent every 24 hours  chlorhexidine 4% Liquid 1 Application(s) Topical <User Schedule>  cosyntropin Injectable 0.25 milliGRAM(s) IV Push once  dextrose 5% + sodium chloride 0.9%. 1000 milliLiter(s) IV Continuous <Continuous>  dextrose 50% Injectable 12.5 Gram(s) IV Push once  dextrose 50% Injectable 25 Gram(s) IV Push once  dextrose 50% Injectable 25 Gram(s) IV Push once  dronabinol 2.5 milliGRAM(s) Oral two times a day  ergocalciferol 19914 Unit(s) Oral every week  ferrous    sulfate 325 milliGRAM(s) Oral two times a day  folic acid 1 milliGRAM(s) Oral daily  heparin  Injectable 5000 Unit(s) SubCutaneous every 12 hours  levothyroxine 100 MICROGram(s) Oral daily  methadone    Tablet 80 milliGRAM(s) Oral every 6 hours  metoclopramide Injectable 5 milliGRAM(s) IV Push every 6 hours  metroNIDAZOLE  IVPB 500 milliGRAM(s) IV Intermittent every 8 hours  mupirocin 2% Ointment 1 Application(s) Topical two times a day  oxybutynin 10 milliGRAM(s) Oral two times a day  pantoprazole    Tablet 40 milliGRAM(s) Oral every 12 hours  sodium bicarbonate 650 milliGRAM(s) Oral two times a day  vancomycin  IVPB 500 milliGRAM(s) IV Intermittent daily    PRN Inpatient Medications  acetaminophen   Tablet .. 650 milliGRAM(s) Oral every 6 hours PRN  dextrose 40% Gel 15 Gram(s) Oral once PRN  diazepam    Tablet 5 milliGRAM(s) Oral two times a day PRN  glucagon  Injectable 1 milliGRAM(s) IntraMuscular once PRN  oxyCODONE    IR 20 milliGRAM(s) Oral every 4 hours PRN        VITALS/PHYSICAL EXAM  --------------------------------------------------------------------------------  T(C): 36.3 (12-10-19 @ 05:05), Max: 36.8 (12-09-19 @ 12:30)  HR: 82 (12-10-19 @ 05:05) (81 - 85)  BP: 124/73 (12-10-19 @ 05:05) (124/73 - 147/76)  RR: 18 (12-10-19 @ 05:05) (18 - 18)  SpO2: 99% (12-10-19 @ 07:41) (99% - 99%)  Wt(kg): --        12-09-19 @ 07:01  -  12-10-19 @ 07:00  --------------------------------------------------------  IN: 0 mL / OUT: 2900 mL / NET: -2900 mL      Physical Exam:  	Gen: NAD  	Pulm: CTA B/L  	CV: S1S2; no rub  	Abd: soft, nontender/nondistended  	: SPC   	LE: B/L BKA     LABS/STUDIES  --------------------------------------------------------------------------------              9.5    8.83  >-----------<  176      [12-10-19 @ 06:06]              31.1     139  |  108  |  36  ----------------------------<  54      [12-10-19 @ 06:06]  4.2   |  18  |  3.5        Ca     7.8     [12-10-19 @ 06:06]      Mg     1.8     [12-10-19 @ 06:06]      Phos  3.8     [12-10-19 @ 06:06]    TPro  4.2  /  Alb  1.1  /  TBili  <0.2  /  DBili  x   /  AST  12  /  ALT  <5  /  AlkPhos  150  [12-10-19 @ 06:06]    PT/INR: PT 19.90, INR 1.74       [12-09-19 @ 16:57]  PTT: 39.4       [12-09-19 @ 16:57]      Creatinine Trend:  SCr 3.5 [12-10 @ 06:06]  SCr 3.7 [12-09 @ 16:57]  SCr 3.6 [12-09 @ 06:28]  SCr 3.4 [12-08 @ 07:56]  SCr 3.5 [12-07 @ 06:24]    Urinalysis - [11-27-19 @ 11:58]      Color Light Yellow / Appearance Slightly Turbid / SG 1.007 / pH 6.5      Gluc Trace / Ketone Negative  / Bili Negative / Urobili <2 mg/dL       Blood Trace / Protein 300 mg/dL / Leuk Est Large / Nitrite Positive      RBC 1 / WBC 59 / Hyaline 4 / Gran  / Sq Epi  / Non Sq Epi 1 / Bacteria Many      Iron 27, TIBC 89, %sat 30      [11-27-19 @ 06:17]  Ferritin 679      [11-27-19 @ 06:17]  PTH -- (Ca 7.8)      [11-28-19 @ 06:29]   99  PTH -- (Ca 7.0)      [10-14-19 @ 19:45]   209  PTH -- (Ca 7.8)      [07-02-19 @ 10:54]   71  Vitamin D (25OH) <5      [10-14-19 @ 19:45]  HbA1c 5.5      [06-27-19 @ 06:35]  TSH 93.10      [12-09-19 @ 16:57]

## 2019-12-10 NOTE — PROGRESS NOTE ADULT - ASSESSMENT
59/M with SILVINA on CKD 4 (baseline creat 3.8 mg% Oct 2019) SPC, severe PVD, presented with cold Lt LE and ulcers, found to have severe anemia Hb 6.8 and acidosis.  # non oliguric  # creatinine stable   # ph  noted, d/c phsolo patient with poor po intake  PTH noted, no need for 1-25 vit D   # h/h noted, no need for venofer sat in the 30 range, no need for LINDY will start once h <9  #ID notes appreciated on  rocephin and flagyl , vanco, last level 24.5, hold vanco   # patient with significant decrease in GFR in view of low muscle mass ( amputations)  # increase  sodium bicarbonate po  to q 8   # patient is refusing hd , no acute indication  # episode of hypoglycemia, decrease po intake, continue IV fluids d5 ns  at 100 cc/h  # vascular notes appreciated, closure of BKA 12/10   # repeated TSH noted, endo evaluation, might need IV synthroid  # GI notes appreciated   #will follow

## 2019-12-10 NOTE — PROGRESS NOTE ADULT - ATTENDING COMMENTS
60 yo M PMHx of PVD s/p Right AKA, paraplegia with multiple chronic bedsores and chronic  suprapubic catheter, HTN (not on any medication), CKD stage IV (not following with nephrology), hypothyroidism, opioid dependance, recent admission due to unresponsiveness, presents for 3-4 days of left foot coldness, pain and worsening ulcers. Pt found to have gangrene and is s/p amputation    Chronic left foot peripheral artery disease + dry gangrene  - Patient with chronic wound of left foot with gangrenous changes.   - Doppler US showed Moderate disease in the left SFA, No flow noted in the left posterior tibial artery   - Patient was not on any antiplatelets or statin.   - Evaluated by vascular surgery, s/p left BKA   - still pending wound closure. Cannot clear for wound closure until this adrenal insufficiency and hypoglycemias are worked up.  - ID consult appreciated  Now Vancomycin 500 mg iv q24h, Check trough again before 4th dose of this, Rocephin 2 gm iv q24h, Flagyl 500 mg iv q8h - continue Abx until 48h after the closure, then recall ID prn.  - Off loading      #Recurrent Hypoglycemias:  Possible differentials: Adrenal insufficiency vs high dose methadone   For Now maintaining sugar with D5NS @ 75.       #Suspect Adrenal insufficiency:  Patient's AM cortisol was 10 when BMP sugar was 23. This is inappropriately low.   Needs ACTH stim test. plan to do it 12/11 6am.    Request Endocrine 2nd opinion by Dr Ruth.    #Malnutrition:  reports loss of appetite.   Imaging showed distended bowel loops but no obstruction. Bowel sounds heard. Passing BMs/gas.  now partial improvement with reglan TID and marinol. BY 12/12 CONSIDER D/CING REGLAN IF APPETITE HAS RECOVERED.   meanwhile, D5NS @ 75 to maintain blood sugars.    #Dysphagia  GI planning EGD outpatient.    Given the 50lb weight loss in last year, questionable ascending colon wall thickness in september 2019 CT Abdo, long time smoker, never had colonscopy - needs colonoscopy outpatient.  repeat CT Abdo here did not show any mass.    Anemia due to Iron deficiency and chronic disease  - s/p 1 unit pRBC 11/27/19    - h/h remains low stable, started on PO iron tx.  - folate- borderline low and vitamin B 12 level- normal  - received 1U PRBC preoperatively    SILVINA on CKD stage 4 + HAGMA  -avoid nephrotoxic agents  - nephrology consult appreciated  - likely prerenal vs ATN from severe hypotension  - as per nephro: no need for venofer as iron saturation in the 30 range, will start LINDY once off ATB   - c/w current sodium bicarb    HTN  - now normotensive  -continue to monitor VS    Opioid dependance  - Patient on methadone 80 mg QID + Oxycontin 60 mg QID  - as per pain management consult- recommended to decrease Oxycodone dose to 20 mg  every 4 hours as needed for breakthrough pain    Chronic sacral bedsore.  - Followed by Dr. Gooden    Hypothyroidism  - severe uncontrolled  - patient was on levothyroxine 50 mcg daily, increased to 100 MCG po once daily , will need to repeat thyroid profile in 2-3 weeks from the increase.  - PTO abx, thyroglobulin level, - unremarkable  US thyroid- no nodules      Severe Vitamin D deficiency- on vitamin D tx.  will changeto 50,000 units/wk after he can swallow better.     Hypomagnesmia  - repleted  - follow up AM level    Progress Note Handoff:  Pending (specify):  surgery, EGD  Family discussion: discussed pain control, surgery, dysphagia with pt and family at bedside  Disposition: Home___/SNF___/Other________/Unknown at this time__x______

## 2019-12-10 NOTE — CHART NOTE - NSCHARTNOTEFT_GEN_A_CORE
Registered Dietitian Follow-Up     Patient Profile Reviewed                           Yes [x]   No []     Nutrition History Previously Obtained        Yes [x]  No []       Pertinent Subjective Information: Pt started on marinol. Pt has been eating but not much. Mostly consuming cereal and juice. Pt previously vomiting after meals, resolved at present, possibly d/t gastroparesis? Pt consistently hypoglycemic, etiology unknown, currently in work up with endocrinology. Pt dislikes all PO supplements/is refusing. Will continue with marinol x3 days, note NST c/s pending.     Pertinent Medical Interventions: Pt is s/p BKA on 12/2, scheduled for closure on 12/5 but canceled due to persistent hypoglycemia (work up pending from endocrinology), receiving D5NS 75cc/h. SILVINA on CKD + HAGMA, pt agreeable to dialysis.      Diet order: mechanical soft, no carb prosource BID, ensure compact BID, ensure pudding BID.     Anthropometrics:  - Ht. 73"  - Wt. dosing 63.4 kg/139.5 lbs (no new wts)  - %wt change NA  - BMI 20.9 adjusted for b/l BKA  - IBW 73.6 kg/162 lbs (IBW - 12% for b/l BKA)     Pertinent Lab Data: (12/10) H/H 9.5/31.1, BUN 36, Cr 3.5, s gluc 54, alk phos 150, eGFR 18     Pertinent Meds: heparin, abx, D5NS 75cc/h, cortrosyn, marinol, dolophine, vit D3, reglan, oxycodone, sodium bicarb, vit C, phoslo, FeSO4, folic acid, synthroid, protonix     Physical Findings:  - Appearance: alert, no edema noted  - GI function: no reported diarrhea/constipation at this time, last BM 12/10  - Tubes: NA  - Oral/Mouth cavity: per SLP 11/29 "+toleration for thin liquids w/o overt s/s penetration/aspiration. Pt w/ immediate vomiting post trials of thin liquids. RN and MD aware. No solid trials presented, however symptoms appear to be r/t possible presence esophageal dysphagia." Per GI: would benefit from EGD after hypoglycemia corrected and after planned foot surgery.  - Skin: Stage III pressure ulcer sacral spine, stage II pressure ulcer L IT, stage III pressure ulcer R buttocks, stage II pressure ulcer R buttocks, stage III pressure ulcer R knee     Nutrition Requirements  Weight Used: 63.4 kg      calorie needs: 1902 - 2219 kcals/day (30-35 kcals/kg for pressure ulcer)  protein needs: 63 - 75 gms/day (1.0 - 1.2  gm/kg for CKD4 and PU's)  fluid needs: per renal     Nutrient Intake: < 25%  likely     [x] Previous Nutrition Diagnosis: 1. Inadequate oral intake 2. Increased nutrient needs            [x] Ongoing             Nutrition Intervention meals and snacks, nutrition related medication management, coordination of care     Goal/Expected Outcome: pt to maintain po intake >50% over the next 3 days     Indicator/Monitoring: RD to monitor diet order, energy intake, body composition, NFPF, glucose profile, renal profile    Recommendation: Continue liberalized diet. Continue marinol. D/C PO supplements as pt refusing all options d/t preferences. Pt likely appropriate for EN vs PN-NST consult noted, RD to sign off once pt is officially picked up.     Pt at risk f/u 3 days (unless picked up by NST)

## 2019-12-10 NOTE — PROGRESS NOTE ADULT - CARDIOVASCULAR
detailed exam
Regular rate & rhythm, normal S1, S2; no murmurs, gallops or rubs; no S3, S4

## 2019-12-10 NOTE — PROGRESS NOTE ADULT - SUBJECTIVE AND OBJECTIVE BOX
LENGTH OF HOSPITAL STAY: 14d    CHIEF COMPLAINT:   Patient is a 59y old  Male who presents with a chief complaint of Cold left lower extremity (09 Dec 2019 18:10)      Overnight events:    No acute events overnight    ALLERGIES:  sulfamethizole (Unknown)    MEDICATIONS:  STANDING MEDICATIONS  ascorbic acid 500 milliGRAM(s) Oral daily  calcium acetate 667 milliGRAM(s) Oral three times a day with meals  calcium carbonate    500 mG (Tums) Chewable 1 Tablet(s) Chew every 8 hours  cefTRIAXone   IVPB 2000 milliGRAM(s) IV Intermittent every 24 hours  chlorhexidine 4% Liquid 1 Application(s) Topical <User Schedule>  dextrose 5% + sodium chloride 0.9%. 1000 milliLiter(s) IV Continuous <Continuous>  dextrose 50% Injectable 12.5 Gram(s) IV Push once  dextrose 50% Injectable 25 Gram(s) IV Push once  dextrose 50% Injectable 25 Gram(s) IV Push once  dronabinol 2.5 milliGRAM(s) Oral two times a day  ergocalciferol 70382 Unit(s) Oral every week  ferrous    sulfate 325 milliGRAM(s) Oral two times a day  folic acid 1 milliGRAM(s) Oral daily  heparin  Injectable 5000 Unit(s) SubCutaneous every 12 hours  levothyroxine 100 MICROGram(s) Oral daily  methadone    Tablet 80 milliGRAM(s) Oral every 6 hours  metoclopramide Injectable 5 milliGRAM(s) IV Push every 6 hours  metroNIDAZOLE  IVPB 500 milliGRAM(s) IV Intermittent every 8 hours  mupirocin 2% Ointment 1 Application(s) Topical two times a day  oxybutynin 10 milliGRAM(s) Oral two times a day  pantoprazole    Tablet 40 milliGRAM(s) Oral every 12 hours  sodium bicarbonate 650 milliGRAM(s) Oral two times a day  vancomycin  IVPB 500 milliGRAM(s) IV Intermittent daily    PRN MEDICATIONS  acetaminophen   Tablet .. 650 milliGRAM(s) Oral every 6 hours PRN  dextrose 40% Gel 15 Gram(s) Oral once PRN  diazepam    Tablet 5 milliGRAM(s) Oral two times a day PRN  glucagon  Injectable 1 milliGRAM(s) IntraMuscular once PRN  oxyCODONE    IR 20 milliGRAM(s) Oral every 4 hours PRN    VITALS:   T(F): 97.4  HR: 82  BP: 124/73  RR: 18  SpO2: 98%    LABS:                        9.3    9.26  )-----------( 166      ( 09 Dec 2019 16:57 )             30.4     12-09    136  |  105  |  39<H>  ----------------------------<  88  4.2   |  20  |  3.7<H>    Ca    7.6<L>      09 Dec 2019 16:57  Phos  3.9     12-09  Mg     1.5     12-09    TPro  4.3<L>  /  Alb  1.3<L>  /  TBili  <0.2  /  DBili  x   /  AST  11  /  ALT  <5  /  AlkPhos  155<H>  12-09    PT/INR - ( 09 Dec 2019 16:57 )   PT: 19.90 sec;   INR: 1.74 ratio         PTT - ( 09 Dec 2019 16:57 )  PTT:39.4 sec                Cultures:      RADIOLOGY:    PHYSICAL EXAM:  GEN: No acute distress  HEENT:   LUNGS: Clear to auscultation bilaterally   HEART: S1/S2 present. RRR.   ABD: Soft, non-tender, non-distended. Bowel sounds present  EXT:  NEURO: AAOX3 LENGTH OF HOSPITAL STAY: 14d    CHIEF COMPLAINT:   Patient is a 59y old  Male who presents with a chief complaint of Cold left lower extremity (09 Dec 2019 18:10)      Overnight events:    No acute events overnight    ALLERGIES:  sulfamethizole (Unknown)    MEDICATIONS:  STANDING MEDICATIONS  ascorbic acid 500 milliGRAM(s) Oral daily  calcium acetate 667 milliGRAM(s) Oral three times a day with meals  calcium carbonate    500 mG (Tums) Chewable 1 Tablet(s) Chew every 8 hours  cefTRIAXone   IVPB 2000 milliGRAM(s) IV Intermittent every 24 hours  chlorhexidine 4% Liquid 1 Application(s) Topical <User Schedule>  dextrose 5% + sodium chloride 0.9%. 1000 milliLiter(s) IV Continuous <Continuous>  dextrose 50% Injectable 12.5 Gram(s) IV Push once  dextrose 50% Injectable 25 Gram(s) IV Push once  dextrose 50% Injectable 25 Gram(s) IV Push once  dronabinol 2.5 milliGRAM(s) Oral two times a day  ergocalciferol 86565 Unit(s) Oral every week  ferrous    sulfate 325 milliGRAM(s) Oral two times a day  folic acid 1 milliGRAM(s) Oral daily  heparin  Injectable 5000 Unit(s) SubCutaneous every 12 hours  levothyroxine 100 MICROGram(s) Oral daily  methadone    Tablet 80 milliGRAM(s) Oral every 6 hours  metoclopramide Injectable 5 milliGRAM(s) IV Push every 6 hours  metroNIDAZOLE  IVPB 500 milliGRAM(s) IV Intermittent every 8 hours  mupirocin 2% Ointment 1 Application(s) Topical two times a day  oxybutynin 10 milliGRAM(s) Oral two times a day  pantoprazole    Tablet 40 milliGRAM(s) Oral every 12 hours  sodium bicarbonate 650 milliGRAM(s) Oral two times a day  vancomycin  IVPB 500 milliGRAM(s) IV Intermittent daily    PRN MEDICATIONS  acetaminophen   Tablet .. 650 milliGRAM(s) Oral every 6 hours PRN  dextrose 40% Gel 15 Gram(s) Oral once PRN  diazepam    Tablet 5 milliGRAM(s) Oral two times a day PRN  glucagon  Injectable 1 milliGRAM(s) IntraMuscular once PRN  oxyCODONE    IR 20 milliGRAM(s) Oral every 4 hours PRN    VITALS:   T(F): 97.4  HR: 82  BP: 124/73  RR: 18  SpO2: 98%    LABS:                        9.3    9.26  )-----------( 166      ( 09 Dec 2019 16:57 )             30.4     12-09    136  |  105  |  39<H>  ----------------------------<  88  4.2   |  20  |  3.7<H>    Ca    7.6<L>      09 Dec 2019 16:57  Phos  3.9     12-09  Mg     1.5     12-09    TPro  4.3<L>  /  Alb  1.3<L>  /  TBili  <0.2  /  DBili  x   /  AST  11  /  ALT  <5  /  AlkPhos  155<H>  12-09    PT/INR - ( 09 Dec 2019 16:57 )   PT: 19.90 sec;   INR: 1.74 ratio         PTT - ( 09 Dec 2019 16:57 )  PTT:39.4 sec                Cultures:      RADIOLOGY:    PHYSICAL EXAM:  GEN: No acute distress  HEENT: SUJIT  LUNGS: Clear to auscultation bilaterally   HEART: S1/S2 present. RRR.   ABD: Soft, non-tender, non-distended. Bowel sounds present  EXT:  R sided BKA noted, new left sided BKA ( dressing c/d/i)  NEURO: AAOX3

## 2019-12-11 NOTE — CONSULT NOTE ADULT - SUBJECTIVE AND OBJECTIVE BOX
60 yo male admitted 11/26:  Mr. Booker is a 60 yo male patient with PMHx of PVD s/p Right AKA months ago, paraplegia with multiple chronic bedsores and suprapubic catheter, HTN (not on any medication), CKD stage IV (not following with nephrology), hypothyroidism, opioid dependance, recent admission due to unresponsiveness, presents for 3-4 days of left foot coldness, pain and worsening ulcers.  pt found to have chronic left foot peripheral artery disease + dry gangrene and underwent left BKA on 12/2/2019. pt initially scheduled for closure on 12/5/2019, but canceled due to hypoglycemia. Glucose noted to be low even upon awakening in am, and with iv dextrose running. Currently undergoing workup, r/o adrenal insufficiency or other etiology.     pt with poor po intake and some abdominal discomfort 12/5 - abdominal X ray:  Gaseous distention of several loops of large and small bowel. Findings   are nonspecific and may reflect ileus versus obstruction.  CT on 12/6:   < from: CT Abdomen and Pelvis No Cont (12.06.19 @ 20:09) >  PERITONEUM/MESENTERY/BOWEL: No evidence of obstruction, colitis,   inflammatory process within the abdomen or pelvis, or ascites. The   appendix is normal in appearance.        BONES/SOFT TISSUES: The chronic decubitus ulcers overlying bilateral   ischial tuberosities and sacrum are again noted. Chronic L1 compression   fracture is again noted.. Mina spinal rods are again noted.Lower   extremity and pelvic muscular atrophy is again noted.     Vital Signs Last 24 Hrs  T(C): 36.2 (11 Dec 2019 12:52), Max: 36.8 (10 Dec 2019 21:16)  T(F): 97.1 (11 Dec 2019 12:52), Max: 98.3 (10 Dec 2019 21:16)  HR: 85 (11 Dec 2019 12:52) (73 - 94)  BP: 136/84 (11 Dec 2019 12:52) (126/76 - 149/80)  RR: 18 (11 Dec 2019 12:52) (18 - 18)  Drug Dosing Weight  Height (cm): 185.42 (11 Dec 2019 12:37)  Weight (kg): 63.4 (11 Dec 2019 12:37)this is admission weight - not weighed since BKA done 12/2  BMI (kg/m2): 18.4 (11 Dec 2019 12:37)  BSA (m2): 1.85 (11 Dec 2019 12:37)  A&O, talkative, skin turgor good, sclerae anicteric  mouth no ulcers or mucositis but + loss of tongue markings and + "raw" feeling  multiple tatoos on both arms, left ac iv access  s/p R AKA, new L BKA wound dressed  + sacral ulcer with osteo  + chronic loss of LBM lilia of thighs, hands  + sacral and scrotal edema    MEDICATIONS  (STANDING):  ascorbic acid 500 milliGRAM(s) Oral daily  calcium carbonate    500 mG (Tums) Chewable 1 Tablet(s) Chew every 8 hours  cefTRIAXone   IVPB 2000 milliGRAM(s) IV Intermittent every 24 hours  chlorhexidine 4% Liquid 1 Application(s) Topical <User Schedule>  dronabinol 2.5 milliGRAM(s) Oral two times a day  ergocalciferol 32365 Unit(s) Oral every week  ferrous    sulfate 325 milliGRAM(s) Oral two times a day  folic acid 1 milliGRAM(s) Oral daily  heparin  Injectable 5000 Unit(s) SubCutaneous every 12 hours  levothyroxine 100 MICROGram(s) Oral daily  methadone    Tablet 80 milliGRAM(s) Oral every 6 hours  metoclopramide Injectable 5 milliGRAM(s) IV Push every 6 hours  metroNIDAZOLE  IVPB 500 milliGRAM(s) IV Intermittent every 8 hours  mupirocin 2% Ointment 1 Application(s) Topical two times a day  oxybutynin 10 milliGRAM(s) Oral two times a day  pantoprazole    Tablet 40 milliGRAM(s) Oral every 12 hours  sodium bicarbonate 650 milliGRAM(s) Oral three times a day  vancomycin  IVPB 500 milliGRAM(s) IV Intermittent daily                        9.8    8.63  )-----------( 182      ( 11 Dec 2019 07:19 )             32.0   12-11    135  |  105  |  37<H>  ----------------------------<  63<L>  4.7   |  19  |  3.5<H>    Ca    7.8<L>      11 Dec 2019 07:19  Phos  4.2     12-11  Mg     1.5     12-11  TPro  4.2<L>  /  Alb  1.1<L>  /  TBili  <0.2  /  DBili  x   /  AST  13  /  ALT  <5  /  AlkPhos  151<H>  12-11  Vitamin D, 25-Hydroxy (10.14.19 @ 19:45)    Vitamin D, 25-Hydroxy: <5:                      30 - 80 ng/ml           Optimum Levels  Vitamin B12, Serum (11.28.19 @ 15:50)    Vitamin B12, Serum: 1335 pg/mL  Folate, Serum (11.28.19 @ 15:50)    Folate, Serum: 6.8 ng/mL  Thyroid Stimulating Hormone, Serum (12.09.19 @ 16:57)    Thyroid Stimulating Hormone, Serum: 93.10 uIU/mL  T4, Serum (11.29.19 @ 07:32)    T4, Serum: 1.3 ug/dL  Iron with Total Binding Capacity (11.27.19 @ 06:17)    Iron - Total Binding Capacity.: 89 ug/dL    % Saturation, Iron: 30 %    Iron Total, Serum: 27 ug/dL    Unsaturated Iron Binding Capacity: 62 ug/dL

## 2019-12-11 NOTE — PROGRESS NOTE ADULT - ASSESSMENT
Mr. Booker is a 60 yo male patient with PMHx of PVD s/p Right AKA months ago, paraplegia with multiple chronic bedsores and suprapubic catheter, HTN (not on any medication), CKD stage IV (not following with nephrology), hypothyroidism, opioid dependance, recent admission due to unresponsiveness, presents for 3-4 days of left foot coldness, pain and worsening ulcers.    # Chronic left foot peripheral artery disease + dry gangrene  - S/p BKA on 12/2/2019, schedule for closure on 12/5/2019 canceled due to hypoglycemia, pending rescheduling as Pt is not stable (hypoglycemic work up pending from Endocrinology)   - Cannot medically clear for closure of BKA unless adrenal insufficency and hypoglycemia worked up  - will keep on maintenance fluids, please hold off if O2 sat <92 percent  - Pending wound cultures. Now on Vancomycin 500 mg iv q24h, Check trough again before 4th dose of this, Rocephin 2 gm iv q24h, Flagyl 500 mg iv q8h  - ordered vanc trough for 23:30 before next vanc  - ABx till closure of BKA. 48h post closure could hold all ABX    # Hypoglycemia  - hypoglycemia any-operatively  - on D5 NS 75 cc/hour, pt with low baseline po intake  - Hypoglycemia workup when Pt was hypoglycemic: Insulin low, C pep normal, B-hydroxybutyrate normal,   - Endo consulted - Etiology remarkably malnourished (low albumin, vitamin D, iron) Vs Methadone induced Vs adrenal insufficiency -  - Nutrition conuslt - malnourishment cannot induce hypoglycemia   - Started merinol  - Cannot clear for closure of BKA unless adrenal insufficency and hypoglycemia worked up  - Standard High dose adrenal insufficiency (250mcg) test performed. waiting for result    # Poor PO intake, difficulty swallowing  - Patient with occasional difficulty swallowing at home  - dysphagia is not pharyngeal   - Speech and Swallow: need to follow up with GI  - pt not a candidate for Barium esophagram per Radiology  - cannot tolerate PO contrast without projectile vomiting  - KUB: gasesous distenstion of several loops of large and small bowel : Ileus versus obstruction  - CT abdomen/pelvis done without contrast due to poor kidney function shows no mass, some urinary bladder thickening and a chronic decubitus ulcer.  - GI: Pt would benefits from EGD. After hypoglycemia corrected and after surgery, will do Outpatient  - added marinol, will make PPI BID, colonoscopy as outpatient    # Hypothyroidism  - will need to re-check thyroid in 2 weeks ( week of 12/15)  - antibody work up sent: - thyroglobulin and thyroperoxidase negative thus far  - Thyroid ultrasounds: negative  - levothyroxine to 100 mcg daily    # Sacral OM and gangrene  - (tachycardia + leukocytosis) on admission  - made Valium PRN due to low bp  - vancomycin 500 mg IV q24  - Flagyl 500 mg iv q8h  - Rocephin  - ABx till closure of BKA. 48h post closure could hold all     # SILVINA on CKD + HAGMA  - non- oliguric  - patient with significant decrease in GFR in view of low muscle mass ( amputations)  - increase  sodium bicarbonate po  to q 8   - patient is refusing hd , no acute   - can increase if Anion gap increases  - h/h noted, no need for venofer sat in the 30 range, no need for LINDY will start once h <9  - f/u Phos, f/u Mag, f/u bmp    # Normocytic anemia  - Iron deficiency and CKD,  multifactorial  - s/p 1 unit pRBC on 11/27  - Patient denies any melena, or hematochezia, noted history of hemorrhoids  - Will trend CBC    # HTN  - not on any medication  - off Amlodipine  Echo results 55-60% , grade 1 pritesh dysfunction    # Opioid dependance  - Patient on methadone 80 mg QID  - Start Oxycodone IR 20mg PO Q8hrs PRN   - s/p Narcan on 11/28  - high risk for overdose given CKD  - appreciate pain management recs as per Dr. Jackson    # Folate acid deficiency  - on folic acid po qd    # Vitamin D def  - 4000 qd daily    # Chronic sacral bedsore.  - Followed by Dr. Gooden

## 2019-12-11 NOTE — CONSULT NOTE ADULT - ASSESSMENT
IMP:  - chronic PVD with left foot gangrene, s/p BKA on 12/2  - sacral osteo  - new (?) hypothyroidism -  this is a signif contributor to the hypoalbuminemia  - severe vitamin D deficiency  - iron deficiency anemia  - CKD - pt does not follow with nephrology (suspect he doesn't follow consistently with medical care in general)  - r/o thrush or esophagitis       - pt c/o having difficulty getting dry/ solid foods past what he describes as his lower neck       - able to drink fluids, able to eat soft meats, suspect some + dysgeusia  - persistent hypoglycemia - workup in progress    Except in the extreme and pre-terminal condition, poor food intake does not cause persistent hypoglycemia.  pt does not eat well, but neither is he absolutely starved for a long period of time.   pt says he doesn't like Ensure, despises Prosource gel, finds many supplements too "medicinal" in taste.  Would add 2000 IU vitamin D po daily, 6 days/week, except on the day he gets the 50,000 IU.  pt has multiple boxes of snack foods at his bedside. Suggested he eat more beneficial foods, or at least drink milk with these snacks. Then he c/o not eating because his appetite has been poor, and variable. Marinol has been helpful.  Would cont D5 iv fluid; await results of endocrine w/u.  Add hs snack, Ensure pudding, peanut butter. pt says his mother has been bringing him snacks. Would bring ice cream milkshakes.  Weigh pt - he has not been weighed since admission, and he's had a BKA.  r/o thrush. consider ENT to look into posterior pharynx.  Calorie counts in progress. Please please inform staff so that things are actually recorded - and include all his snacking.

## 2019-12-11 NOTE — PHARMACOTHERAPY INTERVENTION NOTE - COMMENTS
recommended  md to switch  bactroban nasal ointment to bactroban topical ointment for the nares
Oxycodone IR 40 mg verified with Dr. Chavis  As per Dr. Chavis, this dose was recommended  by attending

## 2019-12-11 NOTE — PROGRESS NOTE ADULT - ASSESSMENT
59/M with SILVINA on CKD 4 (baseline creat 3.8 mg% Oct 2019) SPC, severe PVD, presented with cold Lt LE and ulcers, found to have severe anemia Hb 6.8 and acidosis.  # non oliguric  # creatinine stable   # ph  notedos, off phsolo patient with poor po intake  PTH noted, no need for 1-25 vit D   # h/h noted, no need for venofer sat in the 30 range, no need for LINDY will start once h <9  #ID notes appreciated on  rocephin and flagyl , vanco, last level 24.5, hold vanco   # patient with significant decrease in GFR in view of low muscle mass ( amputations)  #cont   sodium bicarbonate po  to q 8   # patient is refusing hd , no acute indicationh  # vascular notes appreciated, closure of BKA delayed pending stability of blood gluscose   # repeated TSH noted, endo evaluatio pending  # GI notes appreciated

## 2019-12-11 NOTE — PROGRESS NOTE ADULT - SUBJECTIVE AND OBJECTIVE BOX
LENGTH OF HOSPITAL STAY: 15d    CHIEF COMPLAINT:   Patient is a 59y old  Male who presents with a chief complaint of Cold left lower extremity (10 Dec 2019 10:01)      Overnight events:    No acute events overnight    ALLERGIES:  sulfamethizole (Unknown)    MEDICATIONS:  STANDING MEDICATIONS  ascorbic acid 500 milliGRAM(s) Oral daily  calcium carbonate    500 mG (Tums) Chewable 1 Tablet(s) Chew every 8 hours  cefTRIAXone   IVPB 2000 milliGRAM(s) IV Intermittent every 24 hours  chlorhexidine 4% Liquid 1 Application(s) Topical <User Schedule>  dextrose 50% Injectable 12.5 Gram(s) IV Push once  dextrose 50% Injectable 25 Gram(s) IV Push once  dextrose 50% Injectable 25 Gram(s) IV Push once  dronabinol 2.5 milliGRAM(s) Oral two times a day  ergocalciferol 51083 Unit(s) Oral every week  ferrous    sulfate 325 milliGRAM(s) Oral two times a day  folic acid 1 milliGRAM(s) Oral daily  heparin  Injectable 5000 Unit(s) SubCutaneous every 12 hours  levothyroxine 100 MICROGram(s) Oral daily  magnesium sulfate  IVPB 1 Gram(s) IV Intermittent once  methadone    Tablet 80 milliGRAM(s) Oral every 6 hours  metoclopramide Injectable 5 milliGRAM(s) IV Push every 6 hours  metroNIDAZOLE  IVPB 500 milliGRAM(s) IV Intermittent every 8 hours  mupirocin 2% Ointment 1 Application(s) Topical two times a day  oxybutynin 10 milliGRAM(s) Oral two times a day  pantoprazole    Tablet 40 milliGRAM(s) Oral every 12 hours  sodium bicarbonate 650 milliGRAM(s) Oral three times a day  vancomycin  IVPB 500 milliGRAM(s) IV Intermittent daily    PRN MEDICATIONS  acetaminophen   Tablet .. 650 milliGRAM(s) Oral every 6 hours PRN  dextrose 40% Gel 15 Gram(s) Oral once PRN  diazepam    Tablet 5 milliGRAM(s) Oral two times a day PRN  glucagon  Injectable 1 milliGRAM(s) IntraMuscular once PRN  oxyCODONE    IR 20 milliGRAM(s) Oral every 4 hours PRN    VITALS:   T(F): 96.6  HR: 73  BP: 149/80  RR: 18  SpO2: --    LABS:                        9.8    8.63  )-----------( 182      ( 11 Dec 2019 07:19 )             32.0     12-11    135  |  105  |  37<H>  ----------------------------<  63<L>  4.7   |  19  |  3.5<H>    Ca    7.8<L>      11 Dec 2019 07:19  Phos  4.2     12-11  Mg     1.5     12-11    TPro  4.2<L>  /  Alb  1.1<L>  /  TBili  <0.2  /  DBili  x   /  AST  13  /  ALT  <5  /  AlkPhos  151<H>  12-11    PT/INR - ( 09 Dec 2019 16:57 )   PT: 19.90 sec;   INR: 1.74 ratio         PTT - ( 09 Dec 2019 16:57 )  PTT:39.4 sec                Cultures:      RADIOLOGY:    PHYSICAL EXAM:  GEN: No acute distress  HEENT: SUJIT  LUNGS: Clear to auscultation bilaterally   HEART: S1/S2 present. RRR.   ABD: Soft, non-tender, non-distended. Bowel sounds present  EXT:  R sided BKA noted, new left sided BKA ( dressing c/d/i)  NEURO: AAOX3

## 2019-12-11 NOTE — PROGRESS NOTE ADULT - ATTENDING COMMENTS
I saw and evaluated patient  by bedside, c/o persistent pain in left bka, sacral area, patient has poor appetite today.   All labs, radiology studies, VS was reviewed  I have reviewed the resident's note and agree with documented findings and  plan of care, with additional plan:  Mr. Booker is a 58 yo male patient with PMHx of PVD s/p Right AKA, paraplegia with multiple chronic bedsores and chronic  suprapubic catheter, HTN (not on any medication), CKD stage IV (not following with nephrology), hypothyroidism, opioid dependance, recent admission due to unresponsiveness, presents for 3-4 days of left foot coldness, pain and worsening ulcers, patient was dx. with left foot gangrene underwent left foot BKA, post op recovering well, now completing work up for hypoglycemia.    # Chronic left foot peripheral artery disease + dry gangrene  - Patient with chronic wound of left foot with gangrenous changes.   - Doppler US showed Moderate disease in the left SFA, No flow noted in the left posterior tibial artery   - Patient was not on any antiplatelets or statin.   - Evaluated by vascular surgery, s/p left BKA   -Post op care as per Surgical team recommendations.  - Will continue on IV Vanco, with vanco level monitoring,  added on IV Ceftriaxone and IV Flagyl tx.- as per  ID  recommendations      # hypoglycemia- ? possibly due to severe hypothyroidism , less likely ? adrenal insufficiency, ?? pituitary tumor  -  C-peptite, beta-hydroxybutyrate, sulfonylurea, serum proinsulin- no insulinoma  -? in setting of hypothyroidism,   - low to normal serum cortisol levels- s/p cosyntropin stimulation test today- f/up results.  -for now continue dextrose infusion at low rate. with close bsfs monitoring    # Elevated Prolactin level- he is not on any meds to contribute to elevation of prolactin  - on previous admission - he had non-contrast MRI of brain july 2019- edema was noted, no pituitary gland abnormality, will reorder MRI of brain to review pituitary gland and resolution of edema.      # Poor PO intake- patient was eating today w/o difficulty.  - Patient with occasional difficulty swallowing at home for solid food only  - started on appetite stimulant    - Hypotension- resolved post decrease of opioids dose tx.      Anemia due to Iron deficiency and CKD  - s/p 1 unit pRBC 11/27/19    -h/h remains low stable, started on PO iron tx.  - folate- borderline low and vitamin B 12 level- normal  -added on daily folate tx.    # SILVINA on CKD stage 4 + HAGMA  - renal function at baseline   -Nephrology on Board.  - on Oral Bicarb supplement.  - continue renal diet  - Consider LINDY and Epogen  -avoid nephrotoxic agents.      #h/o  HTN- continue current meds      # Opioid dependance  - Patient on methadone 80 mg QID + Oxycontin 60 mg QID ( oversedated- ? how pt. can be on both)- obtained pain management consult- recommended to decrease Oxycodone dose to 40 mg  every 8 hours as needed for severe pain      # Chronic sacral bedsore.  - Followed by Dr. Gooden    # Hypothyroidism- severe uncontrolled-repeated TSH worsening, patient still not responding to Synthroid, will add Liothyronine 25 mg po once daily.  - patient was on levothyroxine 50 mcg daily( November26 till 30th), increased to 100 MCG po once daily(December 01- till 11th) , will need to repeat thyroid profile in 2 weeks.   - PTO abx- negative, thyroglobulin level- negative  US thyroid- no nodules    #hypomagnesemia- supplemented    # Severe Vitamin D deficiency- on vitamin D tx.    daily DVT proph.    #Progress Note Handoff: monitor bsfs, obtain MRI of brain. f/up sx. for post op BKA site care, f/up cosyntropin stimulation test results.   Family discussion: medical plan of tx. d/w pt. Disposition: to be determined .

## 2019-12-11 NOTE — PROGRESS NOTE ADULT - SUBJECTIVE AND OBJECTIVE BOX
Nephrology progress note    Patient was seen and examined, events over the last 24 h noted .    Allergies:  sulfamethizole (Unknown)    Hospital Medications:   MEDICATIONS  (STANDING):  ascorbic acid 500 milliGRAM(s) Oral daily  calcium carbonate    500 mG (Tums) Chewable 1 Tablet(s) Chew every 8 hours  cefTRIAXone   IVPB 2000 milliGRAM(s) IV Intermittent every 24 hours  chlorhexidine 4% Liquid 1 Application(s) Topical <User Schedule>  dextrose 50% Injectable 12.5 Gram(s) IV Push once  dextrose 50% Injectable 25 Gram(s) IV Push once  dextrose 50% Injectable 25 Gram(s) IV Push once  dronabinol 2.5 milliGRAM(s) Oral two times a day  ergocalciferol 91537 Unit(s) Oral every week  ferrous    sulfate 325 milliGRAM(s) Oral two times a day  folic acid 1 milliGRAM(s) Oral daily  heparin  Injectable 5000 Unit(s) SubCutaneous every 12 hours  levothyroxine 100 MICROGram(s) Oral daily  magnesium sulfate  IVPB 1 Gram(s) IV Intermittent once  methadone    Tablet 80 milliGRAM(s) Oral every 6 hours  metoclopramide Injectable 5 milliGRAM(s) IV Push every 6 hours  metroNIDAZOLE  IVPB 500 milliGRAM(s) IV Intermittent every 8 hours  mupirocin 2% Ointment 1 Application(s) Topical two times a day  oxybutynin 10 milliGRAM(s) Oral two times a day  pantoprazole    Tablet 40 milliGRAM(s) Oral every 12 hours  sodium bicarbonate 650 milliGRAM(s) Oral three times a day  vancomycin  IVPB 500 milliGRAM(s) IV Intermittent daily        VITALS:  T(F): 96.6 (12-11-19 @ 05:00), Max: 98.3 (12-10-19 @ 21:16)  HR: 73 (12-11-19 @ 05:00)  BP: 149/80 (12-11-19 @ 05:00)  RR: 18 (12-11-19 @ 05:00)  SpO2: --  Wt(kg): --    12-09 @ 07:01  -  12-10 @ 07:00  --------------------------------------------------------  IN: 0 mL / OUT: 2900 mL / NET: -2900 mL    12-10 @ 07:01  -  12-11 @ 07:00  --------------------------------------------------------  IN: 724 mL / OUT: 3200 mL / NET: -2476 mL          PHYSICAL EXAM:  	Gen: NAD  	Pulm: CTA B/L  	CV: S1S2; no rub  	Abd: soft, nontender/nondistended  	: SPC   	LE: B/L BKA       LABS:  12-11    135  |  105  |  37<H>  ----------------------------<  63<L>  4.7   |  19  |  3.5<H>    Ca    7.8<L>      11 Dec 2019 07:19  Phos  4.2     12-11  Mg     1.5     12-11    TPro  4.2<L>  /  Alb  1.1<L>  /  TBili  <0.2  /  DBili      /  AST  13  /  ALT  <5  /  AlkPhos  151<H>  12-11                          9.8    8.63  )-----------( 182      ( 11 Dec 2019 07:19 )             32.0       Urine Studies:      RADIOLOGY & ADDITIONAL STUDIES:

## 2019-12-12 NOTE — PROGRESS NOTE ADULT - SUBJECTIVE AND OBJECTIVE BOX
MARGE BELLA  [facility] T2-3A 024 B ([facility] T2-3[facility])            Patient was evaluated and examined  by bedside, expressed very angry, violent, disrepectful behavior against me due to not giving him, his home dose of oxycodone, otherwise he denies any other issues, he is eating better today, reports that " he knows how to treat his own body". I have tried to explain to patient that combination of methadone and oxycodone has multiple side effects and in recent week , patient was excessively sedated due to the dose of opioids was too high. patient still has hypoglycemia on IVF with dextrose.       REVIEW OF SYSTEMS:  please see pertinent positives mentioned above, all other 12 ROS negative      T(C): , Max: 36.7 (12-12-19 @ 12:00)  HR: 82 (12-12-19 @ 12:00)  BP: 140/77 (12-12-19 @ 12:00)  RR: 18 (12-12-19 @ 12:00)  SpO2: 99% (12-12-19 @ 08:18)  CAPILLARY BLOOD GLUCOSE      POCT Blood Glucose.: 87 mg/dL (12 Dec 2019 11:39)  POCT Blood Glucose.: 85 mg/dL (12 Dec 2019 08:01)  POCT Blood Glucose.: 156 mg/dL (11 Dec 2019 21:31)  POCT Blood Glucose.: 122 mg/dL (11 Dec 2019 16:34)      PHYSICAL EXAM:  General: NAD, AAOX3, patient is  in bed, very anxious, angry.  HEENT: AT, NC, Supple, NO JVD, NO CB  Lungs: CTA B/L, no wheezing, no rhonchi  CVS: normal S1, S2, RRR, NO M/G/R  Abdomen: soft, bowel sounds present, non-tender, non-distended  Extremities: b/l BKA status, left BKA- covered with dressing, no clubbing, no cyanosis, positive peripheral pulses b/l  Neuro: no acute focal neurological deficits  Skin: as above      LAB  CBC  Date: 12-12-19 @ 06:57  Mean cell Mbnzodgrkv12.0  Mean cell Hemoglobin Conc31.0  Mean cell Volum 90.1  Platelet count-Automate 229  RBC Count 3.54  Red Cell Distrib Width14.3  WBC Count12.54  % Albumin, Urine--  Hematocrit 31.9  Hemoglobin 9.9  CBC  Date: 12-11-19 @ 07:19  Mean cell Cwxhisgmyk08.8  Mean cell Hemoglobin Conc30.6  Mean cell Volum 90.9  Platelet count-Automate 182  RBC Count 3.52  Red Cell Distrib Width14.6  WBC Count8.63  % Albumin, Urine--  Hematocrit 32.0  Hemoglobin 9.8  CBC  Date: 12-10-19 @ 06:06  Mean cell Yekezscobr61.7  Mean cell Hemoglobin Conc30.5  Mean cell Volum 90.7  Platelet count-Automate 176  RBC Count 3.43  Red Cell Distrib Width14.5  WBC Count8.83  % Albumin, Urine--  Hematocrit 31.1  Hemoglobin 9.5  CBC  Date: 12-09-19 @ 16:57  Mean cell Pdhfrzpepe34.7  Mean cell Hemoglobin Conc30.6  Mean cell Volum 90.5  Platelet count-Automate 166  RBC Count 3.36  Red Cell Distrib Width14.5  WBC Count9.26  % Albumin, Urine--  Hematocrit 30.4  Hemoglobin 9.3  CBC  Date: 12-09-19 @ 06:28  Mean cell Wuxujycnky14.8  Mean cell Hemoglobin Conc30.7  Mean cell Volum 90.4  Platelet count-Automate 168  RBC Count 3.53  Red Cell Distrib Width14.5  WBC Count8.82  % Albumin, Urine--  Hematocrit 31.9  Hemoglobin 9.8  CBC  Date: 12-08-19 @ 07:56  Mean cell Plmykvydap44.3  Mean cell Hemoglobin Conc31.5  Mean cell Volum 89.9  Platelet count-Automate 163  RBC Count 3.36  Red Cell Distrib Width14.6  WBC Count8.86  % Albumin, Urine--  Hematocrit 30.2  Hemoglobin 9.5  CBC  Date: 12-07-19 @ 06:24  Mean cell Fdgrgyqjnb28.8  Mean cell Hemoglobin Conc31.2  Mean cell Volum 89.1  Platelet count-Automate 201  RBC Count 3.85  Red Cell Distrib Width14.6  WBC Count9.32  % Albumin, Urine--  Hematocrit 34.3  Hemoglobin 10.7  CBC  Date: 12-05-19 @ 20:54  Mean cell Vacnxpnjxj85.0  Mean cell Hemoglobin Conc31.8  Mean cell Volum 88.0  Platelet count-Automate 229  RBC Count 3.75  Red Cell Distrib Width14.5  WBC Count9.06  % Albumin, Urine--  Hematocrit 33.0  Hemoglobin 10.5    BMP  12-12-19 @ 06:57  Blood Gas Arterial-Calcium,Ionized--  Blood Urea Nitrogen, Serum 36 mg/dL<H> [10 - 20]  Carbon Dioxide, Serum18 mmol/L [17 - 32]  Chloride, Okywa599 mmol/L [98 - 110]  Creatinie, Serum3.6 mg/dL<H> [0.7 - 1.5]  Glucose, Serum81 mg/dL [70 - 99]  Potassium, Serum4.4 mmol/L [3.5 - 5.0]  Sodium, Serum 135 mmol/L [135 - 146]  [facility]  12-11-19 @ 07:19  Blood Gas Arterial-Calcium,Ionized--  Blood Urea Nitrogen, Serum 37 mg/dL<H> [10 - 20]  Carbon Dioxide, Serum19 mmol/L [17 - 32]  Chloride, Gdljt277 mmol/L [98 - 110]  Creatinie, Serum3.5 mg/dL<H> [0.7 - 1.5]  Glucose, Serum63 mg/dL<L> [70 - 99]  Potassium, Serum4.7 mmol/L [3.5 - 5.0]  Sodium, Serum 135 mmol/L [135 - 146]  [facility]  12-10-19 @ 06:06  Blood Gas Arterial-Calcium,Ionized--  Blood Urea Nitrogen, Serum 36 mg/dL<H> [10 - 20]  Carbon Dioxide, Serum18 mmol/L [17 - 32]  Chloride, Hyzxn323 mmol/L [98 - 110]  Creatinie, Serum3.5 mg/dL<H> [0.7 - 1.5]  Glucose, Serum54 mg/dL<L> [70 - 99]  Potassium, Serum4.2 mmol/L [3.5 - 5.0]  Sodium, Serum 139 mmol/L [135 - 146]  [facility]  12-09-19 @ 16:57  Blood Gas Arterial-Calcium,Ionized--  Blood Urea Nitrogen, Serum 39 mg/dL<H> [10 - 20]  Carbon Dioxide, Serum20 mmol/L [17 - 32]  Chloride, Ftoll063 mmol/L [98 - 110]  Creatinie, Serum3.7 mg/dL<H> [0.7 - 1.5]  Glucose, Serum88 mg/dL [70 - 99]  Potassium, Serum4.2 mmol/L [3.5 - 5.0]  Sodium, Serum 136 mmol/L [135 - 146]  [facility]  12-09-19 @ 06:28  Blood Gas Arterial-Calcium,Ionized--  Blood Urea Nitrogen, Serum 39 mg/dL<H> [10 - 20]  Carbon Dioxide, Serum20 mmol/L [17 - 32]  Chloride, Wbpil151 mmol/L [98 - 110]  Creatinie, Serum3.6 mg/dL<H> [0.7 - 1.5]  Glucose, Serum60 mg/dL<L> [70 - 99]  Potassium, Serum4.1 mmol/L [3.5 - 5.0]  Sodium, Serum 138 mmol/L [135 - 146]  BMP  12-08-19 @ 07:56  Blood Gas Arterial-Calcium,Ionized--  Blood Urea Nitrogen, Serum 39 mg/dL<H> [10 - 20]  Carbon Dioxide, Serum21 mmol/L [17 - 32]  Chloride, Qfrpn526 mmol/L [98 - 110]  Creatinie, Serum3.4 mg/dL<H> [0.7 - 1.5]  Glucose, Serum85 mg/dL [70 - 99]  Potassium, Serum4.1 mmol/L [3.5 - 5.0]  Sodium, Serum 137 mmol/L [135 - 146]      Microbiology:    Culture - Blood (collected 11-27-19 @ 00:30)  Source: .Blood Blood-Peripheral  Final Report (12-02-19 @ 07:01):    No growth at 5 days.        Medications:  acetaminophen   Tablet .. 650 milliGRAM(s) Oral every 6 hours PRN  ascorbic acid 500 milliGRAM(s) Oral daily  calcium carbonate    500 mG (Tums) Chewable 1 Tablet(s) Chew every 8 hours  cefTRIAXone   IVPB 2000 milliGRAM(s) IV Intermittent every 24 hours  chlorhexidine 4% Liquid 1 Application(s) Topical <User Schedule>  dextrose 40% Gel 15 Gram(s) Oral once PRN  dextrose 5% + sodium chloride 0.9%. 1000 milliLiter(s) IV Continuous <Continuous>  dextrose 50% Injectable 12.5 Gram(s) IV Push once  dextrose 50% Injectable 25 Gram(s) IV Push once  dextrose 50% Injectable 25 Gram(s) IV Push once  diazepam    Tablet 5 milliGRAM(s) Oral two times a day PRN  dronabinol 2.5 milliGRAM(s) Oral two times a day  ergocalciferol 61420 Unit(s) Oral every week  ferrous    sulfate 325 milliGRAM(s) Oral two times a day  folic acid 1 milliGRAM(s) Oral daily  glucagon  Injectable 1 milliGRAM(s) IntraMuscular once PRN  heparin  Injectable 5000 Unit(s) SubCutaneous every 8 hours  levothyroxine 100 MICROGram(s) Oral daily  liothyronine 25 MICROGram(s) Oral daily  methadone    Tablet 80 milliGRAM(s) Oral every 6 hours  metroNIDAZOLE  IVPB 500 milliGRAM(s) IV Intermittent every 8 hours  mupirocin 2% Ointment 1 Application(s) Topical two times a day  oxybutynin 10 milliGRAM(s) Oral two times a day  oxyCODONE    IR 40 milliGRAM(s) Oral three times a day PRN  sodium bicarbonate 650 milliGRAM(s) Oral three times a day        Assessment and Plan:  Mr. Bella is a 58 yo male patient with PMHx of PVD s/p Right AKA, paraplegia with multiple chronic bedsores and chronic  suprapubic catheter, HTN (not on any medication), CKD stage IV (not following with nephrology), hypothyroidism, opioid dependance, recent admission due to unresponsiveness, presents for 3-4 days of left foot coldness, pain and worsening ulcers, patient was dx. with left foot gangrene underwent left foot BKA, post op recovering well, now completing work up for hypoglycemia.    # Chronic left foot peripheral artery disease + dry gangrene  - Patient with chronic wound of left foot with gangrenous changes.   - Doppler US showed Moderate disease in the left SFA, No flow noted in the left posterior tibial artery   - Patient was not on any antiplatelets or statin.   - Evaluated by vascular surgery, s/p left BKA   -Post op care as per Surgical team recommendations.  - Will hold IV Vanco due to high trough, repeat another vanco trough in am,   - continue  IV Ceftriaxone and IV Flagyl tx.- as per  ID  recommendations  -will complete antibiotics 48 hours post stump closure.      # hypoglycemia- ? possibly due to severe hypothyroidism , less likely ? adrenal insufficiency, ?? pituitary tumor  -  C-peptite, beta-hydroxybutyrate, sulfonylurea, serum proinsulin- no insulinoma  -? in setting of hypothyroidism,   - low to normal serum cortisol levels- s/p cosyntropin stimulation test today- f/up results.  -for now continue dextrose infusion at low rate. with close bsfs monitoring    # Elevated Prolactin level- he is not on any meds to contribute to elevation of prolactin  - on previous admission - he had non-contrast MRI of brain july 2019- edema was noted, no pituitary gland abnormality, will reorder MRI of brain to review pituitary gland and resolution of edema.      # Poor PO intake- patient was eating today w/o difficulty.  - Patient with occasional difficulty swallowing at home for solid food only  - started on appetite stimulant    - Hypotension- resolved post decrease of opioids dose tx.      Anemia due to Iron deficiency and CKD  - s/p 1 unit pRBC 11/27/19    -h/h remains low stable, started on PO iron tx.  - folate- borderline low and vitamin B 12 level- normal  -added on daily folate tx.    # SILVINA on CKD stage 4 + HAGMA  - renal function at baseline   -Nephrology on Board.  - on Oral Bicarb supplement.  - continue renal diet  -avoid nephrotoxic agents.      #h/o  HTN- continue current meds      # Opioid dependance  - Patient at home was taking  methadone 80 mg QID + Oxycontin 60 mg QID. He was clearly oversedated on admission, now more awake, improved appetite, not in pain, his  Oxycodone dose was decreased  to 40 mg  every 8 hours as needed for severe pain. Patient is malingering to gain access to higher doses of oxycodone, which has side effects and contraindicated in his current condition. I tried to explain to the patient that his current dose is optimal for his pain management. Once his stump will heal, he should f/up at pain management clinic to be tapered off methadone and oxycodone tx.      # Chronic sacral bedsore.  - Followed by Dr. Gooden    # Hypothyroidism- severe uncontrolled-repeated TSH worsening, patient still not responding to Synthroid, will add Liothyronine 25 mg po once daily.  - patient was on levothyroxine 50 mcg daily( November26 till 30th), increased to 100 MCG po once daily(December 01- till 11th) , will need to repeat thyroid profile in 2 weeks.   - PTO abx- negative, thyroglobulin level- negative  US thyroid- no nodules    #hypomagnesemia- supplemented    # Severe Vitamin D deficiency- on vitamin D tx.    daily DVT proph.    #Progress Note Handoff: monitor bsfs, obtain MRI of brain. f/up sx. for post op BKA site care- closure, patient can have surgical closure as long as he is receiving dextrose infusion tx., f/up cosyntropin stimulation test results.   Family discussion: medical plan of tx. d/w pt. Disposition: to be determined .

## 2019-12-12 NOTE — CHART NOTE - NSCHARTNOTEFT_GEN_A_CORE
Pt due for comprehensive reassessment tomorrow (12/13), now being followed by NST. RD to sign off for this reason.

## 2019-12-12 NOTE — PROGRESS NOTE ADULT - SUBJECTIVE AND OBJECTIVE BOX
Nephrology progress note    THIS IS AN INCOMPLETE NOTE . FULL NOTE TO FOLLOW SHORTLY    Patient is seen and examined, events over the last 24 h noted .    Allergies:  sulfamethizole (Unknown)    Hospital Medications:   MEDICATIONS  (STANDING):  ascorbic acid 500 milliGRAM(s) Oral daily  calcium carbonate    500 mG (Tums) Chewable 1 Tablet(s) Chew every 8 hours  cefTRIAXone   IVPB 2000 milliGRAM(s) IV Intermittent every 24 hours  chlorhexidine 4% Liquid 1 Application(s) Topical <User Schedule>  dextrose 5% + sodium chloride 0.9%. 1000 milliLiter(s) (50 mL/Hr) IV Continuous <Continuous>  dextrose 50% Injectable 12.5 Gram(s) IV Push once  dextrose 50% Injectable 25 Gram(s) IV Push once  dextrose 50% Injectable 25 Gram(s) IV Push once  dronabinol 2.5 milliGRAM(s) Oral two times a day  ergocalciferol 29199 Unit(s) Oral every week  ferrous    sulfate 325 milliGRAM(s) Oral two times a day  folic acid 1 milliGRAM(s) Oral daily  heparin  Injectable 5000 Unit(s) SubCutaneous every 12 hours  levothyroxine 100 MICROGram(s) Oral daily  liothyronine 25 MICROGram(s) Oral daily  methadone    Tablet 80 milliGRAM(s) Oral every 6 hours  metroNIDAZOLE  IVPB 500 milliGRAM(s) IV Intermittent every 8 hours  mupirocin 2% Ointment 1 Application(s) Topical two times a day  oxybutynin 10 milliGRAM(s) Oral two times a day  sodium bicarbonate 650 milliGRAM(s) Oral three times a day  vancomycin  IVPB 500 milliGRAM(s) IV Intermittent daily        VITALS:  T(F): 96.5 (12-12-19 @ 05:00), Max: 97.5 (12-11-19 @ 21:34)  HR: 83 (12-12-19 @ 05:00)  BP: 164/85 (12-12-19 @ 05:00)  RR: 18 (12-12-19 @ 05:00)  SpO2: 99% (12-12-19 @ 08:18)  Wt(kg): --    12-10 @ 07:01  -  12-11 @ 07:00  --------------------------------------------------------  IN: 724 mL / OUT: 3200 mL / NET: -2476 mL    12-11 @ 07:01  -  12-12 @ 07:00  --------------------------------------------------------  IN: 720 mL / OUT: 3950 mL / NET: -3230 mL    12-12 @ 07:01  -  12-12 @ 09:51  --------------------------------------------------------  IN: 180 mL / OUT: 0 mL / NET: 180 mL      Height (cm): 185.42 (12-11 @ 12:37)  Weight (kg): 63.4 (12-11 @ 12:37)  BMI (kg/m2): 18.4 (12-11 @ 12:37)  BSA (m2): 1.85 (12-11 @ 12:37)    PHYSICAL EXAM:  Constitutional: NAD  HEENT: anicteric sclera, oropharynx clear, MMM  Neck: No JVD  Respiratory: CTAB, no wheezes, rales or rhonchi  Cardiovascular: S1, S2, RRR  Gastrointestinal: BS+, soft, NT/ND  Extremities: No cyanosis or clubbing. No peripheral edema  :  No brown.   Skin: No rashes    LABS:  12-12    135  |  105  |  36<H>  ----------------------------<  81  4.4   |  18  |  3.6<H>    Ca    7.8<L>      12 Dec 2019 06:57  Phos  3.9     12-12  Mg     1.6     12-12    TPro  4.5<L>  /  Alb  1.3<L>  /  TBili  <0.2  /  DBili      /  AST  12  /  ALT  <5  /  AlkPhos  151<H>  12-12                          9.9    12.54 )-----------( 229      ( 12 Dec 2019 06:57 )             31.9       Urine Studies:      RADIOLOGY & ADDITIONAL STUDIES: Nephrology progress note  Patient is seen and examined, events over the last 24 h noted .  Was upset today regarding his pain meds    Allergies:  sulfamethizole (Unknown)    Hospital Medications:   MEDICATIONS  (STANDING):  ascorbic acid 500 milliGRAM(s) Oral daily  calcium carbonate    500 mG (Tums) Chewable 1 Tablet(s) Chew every 8 hours  cefTRIAXone   IVPB 2000 milliGRAM(s) IV Intermittent every 24 hours  dextrose 5% + sodium chloride 0.9%. 1000 milliLiter(s) (50 mL/Hr) IV Continuous <Continuous>  dronabinol 2.5 milliGRAM(s) Oral two times a day  ergocalciferol 73715 Unit(s) Oral every week  ferrous    sulfate 325 milliGRAM(s) Oral two times a day  folic acid 1 milliGRAM(s) Oral daily  heparin  Injectable 5000 Unit(s) SubCutaneous every 12 hours  levothyroxine 100 MICROGram(s) Oral daily  liothyronine 25 MICROGram(s) Oral daily  methadone    Tablet 80 milliGRAM(s) Oral every 6 hours  metroNIDAZOLE  IVPB 500 milliGRAM(s) IV Intermittent every 8 hours  mupirocin 2% Ointment 1 Application(s) Topical two times a day  oxybutynin 10 milliGRAM(s) Oral two times a day  sodium bicarbonate 650 milliGRAM(s) Oral three times a day  vancomycin  IVPB 500 milliGRAM(s) IV Intermittent daily        VITALS:  T(F): 96.5 (12-12-19 @ 05:00), Max: 97.5 (12-11-19 @ 21:34)  HR: 83 (12-12-19 @ 05:00)  BP: 164/85 (12-12-19 @ 05:00)  RR: 18 (12-12-19 @ 05:00)  SpO2: 99% (12-12-19 @ 08:18)      12-10 @ 07:01  -  12-11 @ 07:00  --------------------------------------------------------  IN: 724 mL / OUT: 3200 mL / NET: -2476 mL    12-11 @ 07:01  -  12-12 @ 07:00  --------------------------------------------------------  IN: 720 mL / OUT: 3950 mL / NET: -3230 mL    12-12 @ 07:01  -  12-12 @ 09:51  --------------------------------------------------------  IN: 180 mL / OUT: 0 mL / NET: 180 mL      Height (cm): 185.42 (12-11 @ 12:37)  Weight (kg): 63.4 (12-11 @ 12:37)  BMI (kg/m2): 18.4 (12-11 @ 12:37)  BSA (m2): 1.85 (12-11 @ 12:37)    PHYSICAL EXAM:  Constitutional: NAD  HEENT: anicteric sclera, oropharynx clear, MMM  Neck: No JVD  Respiratory: CTAB, no wheezes, rales or rhonchi  Cardiovascular: S1, S2, RRR  Gastrointestinal: BS+, soft, NT/ND  Extremities: No cyanosis or clubbing. No peripheral edema/ bilateral BKA   :  No brown.   Skin: No rashes    LABS:  12-12    135  |  105  |  36<H>  ----------------------------<  81  4.4   |  18  |  3.6<H>    Creatinine Trend: 3.6<--, 3.5<--, 3.5<--, 3.7<--, 3.6<--, 3.4<--    Ca    7.8<L>      12 Dec 2019 06:57  Phos  3.9     12-12  Mg     1.6     12-12    TPro  4.5<L>  /  Alb  1.3<L>  /  TBili  <0.2  /  DBili      /  AST  12  /  ALT  <5  /  AlkPhos  151<H>  12-12                          9.9    12.54 )-----------( 229      ( 12 Dec 2019 06:57 )             31.9

## 2019-12-12 NOTE — PROGRESS NOTE ADULT - ASSESSMENT
59/M with SILVINA on CKD 4 (baseline creat 3.8 mg% Oct 2019) SPC, severe PVD, presented with cold Lt LE and ulcers, found to have severe anemia Hb 6.8 and acidosis.    # non oliguric  # creatinine stable   # phosphorus  noted , off Phoslo patient with poor po intake  PTH noted, no need for 1-25 vit D   # h/h noted, no need for venofer sat in the 30 range, no need for LINDY will start once hb <9  #ID notes appreciated on  rocephin and flagyl , vanco, last level 24.5, appreciate ID notes / decrease vanco dose to 250 q24h and follow trough   # patient with significant decrease in GFR in view of low muscle mass ( amputations)/ refused HD in the past  #cont   sodium bicarbonate po  to q 8   # vascular notes appreciated, closure of BKA delayed pending stability of blood glucose   # repeated TSH noted, endo evaluation pending on levothyroxine and liiothyronine   # will follow

## 2019-12-12 NOTE — PROGRESS NOTE ADULT - SUBJECTIVE AND OBJECTIVE BOX
LENGTH OF HOSPITAL STAY: 16d    CHIEF COMPLAINT:   Patient is a 59y old  Male who presents with a chief complaint of Cold left lower extremity (12 Dec 2019 14:05)      Overnight events:    No acute events overnight    ALLERGIES:  sulfamethizole (Unknown)    MEDICATIONS:  STANDING MEDICATIONS  ascorbic acid 500 milliGRAM(s) Oral daily  calcium carbonate    500 mG (Tums) Chewable 1 Tablet(s) Chew every 8 hours  cefTRIAXone   IVPB 2000 milliGRAM(s) IV Intermittent every 24 hours  chlorhexidine 4% Liquid 1 Application(s) Topical <User Schedule>  dextrose 5% + sodium chloride 0.9%. 1000 milliLiter(s) IV Continuous <Continuous>  dextrose 50% Injectable 12.5 Gram(s) IV Push once  dextrose 50% Injectable 25 Gram(s) IV Push once  dextrose 50% Injectable 25 Gram(s) IV Push once  dronabinol 2.5 milliGRAM(s) Oral two times a day  ergocalciferol 29987 Unit(s) Oral every week  ferrous    sulfate 325 milliGRAM(s) Oral two times a day  folic acid 1 milliGRAM(s) Oral daily  heparin  Injectable 5000 Unit(s) SubCutaneous every 8 hours  levothyroxine 100 MICROGram(s) Oral daily  liothyronine 25 MICROGram(s) Oral daily  methadone    Tablet 80 milliGRAM(s) Oral every 6 hours  metroNIDAZOLE  IVPB 500 milliGRAM(s) IV Intermittent every 8 hours  mupirocin 2% Ointment 1 Application(s) Topical two times a day  oxybutynin 10 milliGRAM(s) Oral two times a day  sodium bicarbonate 650 milliGRAM(s) Oral three times a day    PRN MEDICATIONS  acetaminophen   Tablet .. 650 milliGRAM(s) Oral every 6 hours PRN  dextrose 40% Gel 15 Gram(s) Oral once PRN  diazepam    Tablet 5 milliGRAM(s) Oral two times a day PRN  glucagon  Injectable 1 milliGRAM(s) IntraMuscular once PRN  oxyCODONE    IR 40 milliGRAM(s) Oral three times a day PRN    VITALS:   T(F): 98.1  HR: 82  BP: 140/77  RR: 18  SpO2: 99%    LABS:                        9.9    12.54 )-----------( 229      ( 12 Dec 2019 06:57 )             31.9     12-12    135  |  105  |  36<H>  ----------------------------<  81  4.4   |  18  |  3.6<H>    Ca    7.8<L>      12 Dec 2019 06:57  Phos  3.9     12-12  Mg     1.6     12-12    TPro  4.5<L>  /  Alb  1.3<L>  /  TBili  <0.2  /  DBili  x   /  AST  12  /  ALT  <5  /  AlkPhos  151<H>  12-12                    Cultures:      RADIOLOGY:    PHYSICAL EXAM:  GEN: No acute distress  HEENT: SUJIT  LUNGS: Clear to auscultation bilaterally   HEART: S1/S2 present. RRR.   ABD: Soft, non-tender, non-distended. Bowel sounds present  EXT:  R sided BKA noted, new left sided BKA ( dressing c/d/i)  NEURO: AAOX3

## 2019-12-12 NOTE — PROGRESS NOTE ADULT - ASSESSMENT
Mr. Booker is a 58 yo male patient with PMHx of PVD s/p Right AKA months ago, paraplegia with multiple chronic bedsores and suprapubic catheter, HTN (not on any medication), CKD stage IV (not following with nephrology), hypothyroidism, opioid dependance, recent admission due to unresponsiveness, presents for 3-4 days of left foot coldness, pain and worsening ulcers.    # Chronic left foot peripheral artery disease + dry gangrene  - S/p BKA on 12/2/2019, schedule for closure on 12/5/2019 canceled due to hypoglycemia,  - Cannot medically clear for closure of BKA unless adrenal insufficency and hypoglycemia worked up  - will keep on maintenance dextrose fluids, please hold off if O2 sat <92 percent  - Pending wound cultures.   - Holding the Vancomycin and will follow vanc trough  - c/w Rocephin 2 gm iv q24h, Flagyl 500 mg iv q8h  - ABx till closure of BKA. 48h post closure could hold all ABX    # Hypoglycemia  - hypoglycemia any-operatively  - on D5 NS 50 cc/hour, pt with low baseline po intake  - Hypoglycemia workup: Insulin low, C pep normal, B-hydroxybutyrate normal,   - Endo consulted - Etiology remarkably malnourished (low albumin, vitamin D, iron) Vs Methadone induced Vs adrenal insufficiency -  - Nutrition conuslt - malnourishment cannot induce hypoglycemia   - Started merinol or low appetite   - possibly due to severe hypothyroidism , less likely ? adrenal insufficiency, ?? pituitary tumor - MR brain ordered  - Standard High dose adrenal insufficiency (250mcg) test performed. waiting for result      # Poor PO intake, difficulty swallowing  - dysphagia is not pharyngeal  - pt not a candidate for Barium esophagram per Radiology  - cannot tolerate PO contrast without projectile vomiting  - KUB: gasesous distenstion of several loops of large and small bowel : Ileus versus obstruction  - CT abdomen/pelvis done without contrast due to poor kidney function shows no mass, some urinary bladder thickening and a chronic decubitus ulcer.  - GI: Pt would benefits from EGD. After hypoglycemia corrected and after surgery, will do Outpatient  - added marinol, will make PPI BID, colonoscopy as outpatient    # Hypothyroidism  - will need to re-check thyroid in 2 weeks ( week of 12/15)  - antibody work up sent: - thyroglobulin and thyroperoxidase negative thus far  - Thyroid ultrasounds: negative  - levothyroxine to 100 mcg daily    # Sacral OM and gangrene  - made Valium PRN due to low bp  - vancomycin on hold for high vanc trough  - Flagyl 500 mg iv q8h  - Rocephin  - ABx till closure of BKA. 48h post closure could hold all     # SILVINA on CKD + HAGMA  - non- oliguric  - patient with significant decrease in GFR in view of low muscle mass ( amputations)  - increase  sodium bicarbonate po  to q 8   - patient is refusing hd , no acute   - can increase if Anion gap increases  - h/h noted, no need for venofer sat in the 30 range, no need for LINDY will start once h <9  - f/u Phos, f/u Mag, f/u bmp    # Normocytic anemia  - Iron deficiency and CKD,  multifactorial  - s/p 1 unit pRBC on 11/27  - Patient denies any melena, or hematochezia, noted history of hemorrhoids  - Will trend CBC    # HTN  - not on any medication  - off Amlodipine  Echo results 55-60% , grade 1 pritesh dysfunction    # Opioid dependance  - Patient was on methadone 80 mg QID + Oxycodone 60mg QID  - was oversedated on admission, awake after narcan  - Decreased Oxycodone IR to 40mg PO Q8hrs PRN   - high risk for overdose given CKD  - Patient is malingering to gain access to higher doses of oxycodone, which has side effects and contraindicated in his current condition  - Once his stump will heal, he should f/up at pain management clinic to be tapered off methadone and oxycodone tx.    # Folate acid deficiency  - on folic acid po qd    # Vitamin D def  - 4000 qd daily    # Chronic sacral bedsore.  - Followed by Dr. Gooden Mr. Booker is a 60 yo male patient with PMHx of PVD s/p Right AKA months ago, paraplegia with multiple chronic bedsores and suprapubic catheter, HTN (not on any medication), CKD stage IV (not following with nephrology), hypothyroidism, opioid dependance, recent admission due to unresponsiveness, presents for 3-4 days of left foot coldness, pain and worsening ulcers.    # Chronic left foot peripheral artery disease + dry gangrene  - S/p BKA on 12/2/2019  - Closure was canceled on 12/5/2019 due to hypoglycemia,  - will keep on maintenance dextrose fluids, please hold off if O2 sat <92 percent  - patient can have surgical closure as long as he is receiving dextrose infusion tx as per attending  - ABx till closure of BKA. 48h post closure could hold all ABX    # Hypoglycemia  - hypoglycemia any-operatively  - on D5 NS 50 cc/hour, pt with low baseline po intake  - Hypoglycemia workup: Insulin low, C pep normal, B-hydroxybutyrate normal,   - Endo consulted - Etiology remarkably malnourished (low albumin, vitamin D, iron) Vs Methadone induced Vs adrenal insufficiency - severe hypothyroidism , less likely ? adrenal insufficiency, ?? pituitary tumor - MR brain ordered  - Nutrition conuslt - malnourishment cannot induce hypoglycemia - on merinol now   - Standard High dose adrenal insufficiency (250mcg) test performed. waiting for result, MRI pitutary ordered    # Poor PO intake, difficulty swallowing  - dysphagia is not pharyngeal  - pt not a candidate for Barium esophagram per Radiology  - cannot tolerate PO contrast without projectile vomiting  - GI: Pt would benefits from EGD. After hypoglycemia corrected and after surgery, will do Outpatient  - added marinol, will make PPI BID, colonoscopy as outpatient    # Hypothyroidism  - will need to re-check TSH am  - antibody work up sent: - thyroglobulin and thyroperoxidase negative thus far  - Thyroid ultrasounds: negative  - levothyroxine to 100 mcg daily    # Sacral OM and gangrene  - made Valium PRN due to low bp  - vancomycin on hold for high vanc trough  - Flagyl 500 mg iv q8h  - Rocephin  - ABx till closure of BKA. 48h post closure could hold all     # SILVINA on CKD + HAGMA  - non- oliguric  - patient with significant decrease in GFR in view of low muscle mass ( amputations)  - increase  sodium bicarbonate po  to q 8   - patient is refusing hd , no acute   - can increase if Anion gap increases  - h/h noted, no need for venofer sat in the 30 range, no need for LINDY will start once h <9  - f/u Phos, f/u Mag, f/u bmp    # Normocytic anemia  - Iron deficiency and CKD,  multifactorial  - s/p 1 unit pRBC on 11/27  - Patient denies any melena, or hematochezia, noted history of hemorrhoids  - Will trend CBC    # HTN  - not on any medication  - off Amlodipine  Echo results 55-60% , grade 1 pritesh dysfunction    # Opioid dependance  - Patient was on methadone 80 mg QID + Oxycodone 60mg QID  - was oversedated on admission, awake after narcan  - Attending decreased Oxycodone IR to 40mg PO Q8hrs PRN   - high risk for overdose given CKD  - Patient is malingering to gain access to higher doses of oxycodone, which has side effects and contraindicated in his current condition  - Once his stump will heal, he should f/up at pain management clinic to be tapered off methadone and oxycodone tx.    # Folate acid deficiency  - on folic acid po qd    # Vitamin D def  - 4000 qd daily    # Chronic sacral bedsore.  - Followed by Dr. Gooden

## 2019-12-13 NOTE — PROGRESS NOTE ADULT - SUBJECTIVE AND OBJECTIVE BOX
LENGTH OF HOSPITAL STAY: 17d    CHIEF COMPLAINT:   Patient is a 59y old  Male who presents with a chief complaint of Cold left lower extremity (13 Dec 2019 09:02)      Overnight events:    No acute events overnight    ALLERGIES:  sulfamethizole (Unknown)    MEDICATIONS:  STANDING MEDICATIONS  ascorbic acid 500 milliGRAM(s) Oral daily  calcium carbonate    500 mG (Tums) Chewable 1 Tablet(s) Chew every 8 hours  cefTRIAXone   IVPB 2000 milliGRAM(s) IV Intermittent every 24 hours  chlorhexidine 4% Liquid 1 Application(s) Topical <User Schedule>  dextrose 5% + sodium chloride 0.9%. 1000 milliLiter(s) IV Continuous <Continuous>  dextrose 50% Injectable 12.5 Gram(s) IV Push once  dextrose 50% Injectable 25 Gram(s) IV Push once  dextrose 50% Injectable 25 Gram(s) IV Push once  dronabinol 2.5 milliGRAM(s) Oral two times a day  ergocalciferol 76510 Unit(s) Oral every week  ferrous    sulfate 325 milliGRAM(s) Oral two times a day  folic acid 1 milliGRAM(s) Oral daily  heparin  Injectable 5000 Unit(s) SubCutaneous every 8 hours  levothyroxine 100 MICROGram(s) Oral daily  liothyronine 25 MICROGram(s) Oral daily  methadone    Tablet 80 milliGRAM(s) Oral every 6 hours  metroNIDAZOLE  IVPB 500 milliGRAM(s) IV Intermittent every 8 hours  mupirocin 2% Ointment 1 Application(s) Topical two times a day  oxybutynin 10 milliGRAM(s) Oral two times a day  sodium bicarbonate 650 milliGRAM(s) Oral three times a day    PRN MEDICATIONS  acetaminophen   Tablet .. 650 milliGRAM(s) Oral every 6 hours PRN  dextrose 40% Gel 15 Gram(s) Oral once PRN  diazepam    Tablet 5 milliGRAM(s) Oral two times a day PRN  glucagon  Injectable 1 milliGRAM(s) IntraMuscular once PRN  oxyCODONE    IR 40 milliGRAM(s) Oral three times a day PRN    VITALS:   T(F): 97.6  HR: 80  BP: 140/70  RR: 18  SpO2: 100%    LABS:                        9.8    10.03 )-----------( 253      ( 13 Dec 2019 04:30 )             31.5     12-13    134<L>  |  104  |  35<H>  ----------------------------<  63<L>  4.4   |  18  |  3.5<H>    Ca    7.8<L>      13 Dec 2019 04:30  Phos  4.1     12-13  Mg     1.5     12-13    TPro  4.5<L>  /  Alb  1.3<L>  /  TBili  <0.2  /  DBili  x   /  AST  12  /  ALT  <5  /  AlkPhos  151<H>  12-12                    Cultures:      RADIOLOGY:    PHYSICAL EXAM:  GEN: No acute distress  HEENT:   LUNGS: Clear to auscultation bilaterally   HEART: S1/S2 present. RRR.   ABD: Soft, non-tender, non-distended. Bowel sounds present  EXT:  NEURO: AAOX3

## 2019-12-13 NOTE — PROGRESS NOTE ADULT - SUBJECTIVE AND OBJECTIVE BOX
Nephrology progress note    THIS IS AN INCOMPLETE NOTE . FULL NOTE TO FOLLOW SHORTLY    Patient is seen and examined, events over the last 24 h noted .    Allergies:  sulfamethizole (Unknown)    Hospital Medications:   MEDICATIONS  (STANDING):  ascorbic acid 500 milliGRAM(s) Oral daily  calcium carbonate    500 mG (Tums) Chewable 1 Tablet(s) Chew every 8 hours  cefTRIAXone   IVPB 2000 milliGRAM(s) IV Intermittent every 24 hours  chlorhexidine 4% Liquid 1 Application(s) Topical <User Schedule>  cosyntropin Injectable 0.25 milliGRAM(s) IV Push once  dextrose 5% + sodium chloride 0.9%. 1000 milliLiter(s) (50 mL/Hr) IV Continuous <Continuous>  dextrose 50% Injectable 12.5 Gram(s) IV Push once  dextrose 50% Injectable 25 Gram(s) IV Push once  dextrose 50% Injectable 25 Gram(s) IV Push once  dronabinol 2.5 milliGRAM(s) Oral two times a day  ergocalciferol 51590 Unit(s) Oral every week  ferrous    sulfate 325 milliGRAM(s) Oral two times a day  folic acid 1 milliGRAM(s) Oral daily  heparin  Injectable 5000 Unit(s) SubCutaneous every 8 hours  levothyroxine 100 MICROGram(s) Oral daily  liothyronine 25 MICROGram(s) Oral daily  methadone    Tablet 80 milliGRAM(s) Oral every 6 hours  metroNIDAZOLE  IVPB 500 milliGRAM(s) IV Intermittent every 8 hours  mupirocin 2% Ointment 1 Application(s) Topical two times a day  oxybutynin 10 milliGRAM(s) Oral two times a day  sodium bicarbonate 650 milliGRAM(s) Oral three times a day        VITALS:  T(F): 97.6 (12-13-19 @ 06:18), Max: 98.6 (12-12-19 @ 20:30)  HR: 80 (12-13-19 @ 06:43)  BP: 140/70 (12-13-19 @ 06:43)  RR: 18 (12-13-19 @ 06:18)  SpO2: 100% (12-13-19 @ 06:18)  Wt(kg): --    12-11 @ 07:01  -  12-12 @ 07:00  --------------------------------------------------------  IN: 720 mL / OUT: 3950 mL / NET: -3230 mL    12-12 @ 07:01  -  12-13 @ 07:00  --------------------------------------------------------  IN: 280 mL / OUT: 2950 mL / NET: -2670 mL          PHYSICAL EXAM:  Constitutional: NAD  HEENT: anicteric sclera, oropharynx clear, MMM  Neck: No JVD  Respiratory: CTAB, no wheezes, rales or rhonchi  Cardiovascular: S1, S2, RRR  Gastrointestinal: BS+, soft, NT/ND  Extremities: No cyanosis or clubbing. No peripheral edema  :  No brown.   Skin: No rashes    LABS:  12-13    134<L>  |  104  |  35<H>  ----------------------------<  63<L>  4.4   |  18  |  3.5<H>    Ca    7.8<L>      13 Dec 2019 04:30  Phos  4.1     12-13  Mg     1.5     12-13    TPro  4.5<L>  /  Alb  1.3<L>  /  TBili  <0.2  /  DBili      /  AST  12  /  ALT  <5  /  AlkPhos  151<H>  12-12                          9.8    10.03 )-----------( 253      ( 13 Dec 2019 04:30 )             31.5       Urine Studies:      RADIOLOGY & ADDITIONAL STUDIES: Nephrology progress note  Patient is seen and examined, events over the last 24 h noted .  No events no complaints today    Allergies:  sulfamethizole (Unknown)    Hospital Medications:   MEDICATIONS  (STANDING):  ascorbic acid 500 milliGRAM(s) Oral daily  calcium carbonate    500 mG (Tums) Chewable 1 Tablet(s) Chew every 8 hours  cefTRIAXone   IVPB 2000 milliGRAM(s) IV Intermittent every 24 hours  cosyntropin Injectable 0.25 milliGRAM(s) IV Push once  dronabinol 2.5 milliGRAM(s) Oral two times a day  ergocalciferol 12924 Unit(s) Oral every week  ferrous    sulfate 325 milliGRAM(s) Oral two times a day  folic acid 1 milliGRAM(s) Oral daily  heparin  Injectable 5000 Unit(s) SubCutaneous every 8 hours  levothyroxine 100 MICROGram(s) Oral daily  liothyronine 25 MICROGram(s) Oral daily  methadone    Tablet 80 milliGRAM(s) Oral every 6 hours  metroNIDAZOLE  IVPB 500 milliGRAM(s) IV Intermittent every 8 hours  mupirocin 2% Ointment 1 Application(s) Topical two times a day  oxybutynin 10 milliGRAM(s) Oral two times a day  sodium bicarbonate 650 milliGRAM(s) Oral three times a day        VITALS:  T(F): 97.6 (12-13-19 @ 06:18), Max: 98.6 (12-12-19 @ 20:30)  HR: 80 (12-13-19 @ 06:43)  BP: 140/70 (12-13-19 @ 06:43)  RR: 18 (12-13-19 @ 06:18)  SpO2: 100% (12-13-19 @ 06:18)      12-11 @ 07:01  -  12-12 @ 07:00  --------------------------------------------------------  IN: 720 mL / OUT: 3950 mL / NET: -3230 mL    12-12 @ 07:01  -  12-13 @ 07:00  --------------------------------------------------------  IN: 280 mL / OUT: 2950 mL / NET: -2670 mL          PHYSICAL EXAM:  Constitutional: NAD  HEENT: anicteric sclera, oropharynx clear, MMM  Neck: No JVD  Respiratory: CTAB, no wheezes, rales or rhonchi  Cardiovascular: S1, S2, RRR  Gastrointestinal: BS+, soft, NT/ND  Extremities: No cyanosis or clubbing. BILATERAL bka   :  No brown.   Skin: No rashes    LABS:  12-13    134<L>  |  104  |  35<H>  ----------------------------<  63<L>  4.4   |  18  |  3.5<H>    Creatinine Trend: 3.5<--, 3.6<--, 3.5<--, 3.5<--, 3.7<--, 3.6<--    Ca    7.8<L>      13 Dec 2019 04:30  Phos  4.1     12-13  Mg     1.5     12-13    TPro  4.5<L>  /  Alb  1.3<L>  /  TBili  <0.2  /  DBili      /  AST  12  /  ALT  <5  /  AlkPhos  151<H>  12-12                          9.8    10.03 )-----------( 253      ( 13 Dec 2019 04:30 )             31.5       Urine Studies:      RADIOLOGY & ADDITIONAL STUDIES:

## 2019-12-13 NOTE — PROGRESS NOTE ADULT - ASSESSMENT
Mr. Booker is a 60 yo male patient with PMHx of PVD s/p Right AKA months ago, paraplegia with multiple chronic bedsores and suprapubic catheter, HTN (not on any medication), CKD stage IV (not following with nephrology), hypothyroidism, opioid dependance, recent admission due to unresponsiveness, presents for 3-4 days of left foot coldness, pain and worsening ulcers.    # Chronic left foot peripheral artery disease + dry gangrene  - S/p BKA on 12/2/2019  - Closure was canceled on 12/5/2019 due to hypoglycemia,  - will keep on maintenance dextrose fluids, please hold off if O2 sat <92 percent  - patient can have surgical closure as long as he is receiving dextrose infusion tx as per attending  - ABx till closure of BKA. 48h post closure could hold all ABX    # Hypoglycemia  - hypoglycemia any-operatively  - on D5 NS 50 cc/hour, pt with low baseline po intake  - Hypoglycemia workup: Insulin low, C pep normal, B-hydroxybutyrate normal,   - Endo consulted - Etiology remarkably malnourished (low albumin, vitamin D, iron) Vs Methadone induced Vs adrenal insufficiency - severe hypothyroidism , less likely ? adrenal insufficiency, ?? pituitary tumor - MR brain ordered  - Nutrition conuslt - malnourishment cannot induce hypoglycemia - on merinol now   - Standard High dose adrenal insufficiency (250mcg) test performed. waiting for result, MRI pitutary ordered    # Poor PO intake, difficulty swallowing  - dysphagia is not pharyngeal  - pt not a candidate for Barium esophagram per Radiology  - cannot tolerate PO contrast without projectile vomiting  - GI: Pt would benefits from EGD. After hypoglycemia corrected and after surgery, will do Outpatient  - added marinol, will make PPI BID, colonoscopy as outpatient    # Hypothyroidism  - will need to re-check TSH am  - antibody work up sent: - thyroglobulin and thyroperoxidase negative thus far  - Thyroid ultrasounds: negative  - levothyroxine to 100 mcg daily    # Sacral OM and gangrene  - made Valium PRN due to low bp  - vancomycin on hold for high vanc trough  - Flagyl 500 mg iv q8h  - Rocephin  - ABx till closure of BKA. 48h post closure could hold all     # SILVINA on CKD + HAGMA  - non- oliguric  - patient with significant decrease in GFR in view of low muscle mass ( amputations)  - increase  sodium bicarbonate po  to q 8   - patient is refusing hd , no acute   - can increase if Anion gap increases  - h/h noted, no need for venofer sat in the 30 range, no need for LINDY will start once h <9  - f/u Phos, f/u Mag, f/u bmp    # Normocytic anemia  - Iron deficiency and CKD,  multifactorial  - s/p 1 unit pRBC on 11/27  - Patient denies any melena, or hematochezia, noted history of hemorrhoids  - Will trend CBC    # HTN  - not on any medication  - off Amlodipine  Echo results 55-60% , grade 1 pritesh dysfunction    # Opioid dependance  - Patient was on methadone 80 mg QID + Oxycodone 60mg QID  - was oversedated on admission, awake after narcan  - Attending decreased Oxycodone IR to 40mg PO Q8hrs PRN   - high risk for overdose given CKD  - Patient is malingering to gain access to higher doses of oxycodone, which has side effects and contraindicated in his current condition  - Once his stump will heal, he should f/up at pain management clinic to be tapered off methadone and oxycodone tx.    # Folate acid deficiency  - on folic acid po qd    # Vitamin D def  - 4000 qd daily    # Chronic sacral bedsore.  - Followed by Dr. Gooden Mr. Booker is a 60 yo male patient with PMHx of PVD s/p Right AKA months ago, paraplegia with multiple chronic bedsores and suprapubic catheter, HTN (not on any medication), CKD stage IV (not following with nephrology), hypothyroidism, opioid dependance, recent admission due to unresponsiveness, presents for 3-4 days of left foot coldness, pain and worsening ulcers.    # Chronic left foot peripheral artery disease + dry gangrene  - S/p BKA on 12/2/2019  - Closure was canceled on 12/5/2019 due to hypoglycemia,  - will keep on maintenance dextrose fluids, please hold off if O2 sat <92 percent  - patient can have surgical closure as long as he is receiving dextrose infusion tx as per attending  - ABx till closure of BKA. 48h post closure could hold all ABX    # Hypoglycemia  - hypoglycemia any-operatively  - on D5 NS 50 cc/hour, pt with low baseline po intake  - Hypoglycemia workup: Insulin low, C pep normal, B-hydroxybutyrate normal,   - Endo consulted - Etiology remarkably malnourished (low albumin, vitamin D, iron) Vs Methadone induced Vs adrenal insufficiency - severe hypothyroidism , less likely ? adrenal insufficiency, ?? pituitary tumor - MR brain ordered  - Nutrition conuslt - malnourishment cannot induce hypoglycemia - on merinol now   - Standard High dose adrenal insufficiency (250mcg) test performed. waiting for result, MRI pitutary ordered    # Poor PO intake, difficulty swallowing  - dysphagia is not pharyngeal  - pt not a candidate for Barium esophagram per Radiology  - cannot tolerate PO contrast without projectile vomiting  - GI: Pt would benefits from EGD. After hypoglycemia corrected and after surgery, will do Outpatient  - added marinol, will make PPI BID, colonoscopy as outpatient    # Hypothyroidism  - will need to re-check TSH am  - antibody work up sent: - thyroglobulin and thyroperoxidase negative thus far  - Thyroid ultrasounds: negative  - levothyroxine to 100 mcg daily    # Sacral OM and gangrene  - made Valium PRN due to low bp  - vancomycin on hold for high vanc trough  - Flagyl 500 mg iv q8h  - Rocephin  - ABx till closure of BKA. 48h post closure could hold all     # SILVINA on CKD + HAGMA  - non- oliguric  - patient with significant decrease in GFR in view of low muscle mass ( amputations)  - increase  sodium bicarbonate po  to q 8   - patient is refusing hd , no acute   - can increase if Anion gap increases  - h/h noted, no need for venofer sat in the 30 range, no need for LINDY will start once h <9  - f/u Phos, f/u Mag, f/u bmp    # Normocytic anemia  - Iron deficiency and CKD,  multifactorial  - s/p 1 unit pRBC on 11/27  - Patient denies any melena, or hematochezia, noted history of hemorrhoids  - Will trend CBC    # HTN  - not on any medication  - off Amlodipine  Echo results 55-60% , grade 1 pritesh dysfunction    # Opioid dependance  - Patient was on methadone 80 mg QID + Oxycodone 60mg QID  - was oversedated on admission, awake after narcan  - high risk for overdose given CKD  - Patient is malingering to gain access to higher doses of oxycodone, which has side effects and contraindicated in his current condition  - Once his stump will heal, he should f/up at pain management clinic to be tapered off methadone and oxycodone tx.  - Pain management consulted: Methadone 80mg q8, Oxycodone IR 50 q6 PRN, Tylenol 50 q6, Amitriptyline 25mg qHS for now    # Folate acid deficiency  - on folic acid po qd    # Vitamin D def  - 4000 qd daily    # Chronic sacral bedsore.  - Followed by Dr. Gooden

## 2019-12-13 NOTE — PROGRESS NOTE ADULT - ATTENDING COMMENTS
Mr. Booker is a 58 yo male patient with PMHx of PVD s/p Right AKA, paraplegia with multiple chronic bedsores and chronic  suprapubic catheter, HTN (not on any medication), CKD stage IV (not following with nephrology), hypothyroidism, opioid dependance, recent admission due to unresponsiveness, presents for 3-4 days of left foot coldness, pain and worsening ulcers, patient was dx. with left foot gangrene underwent left foot BKA, post op recovering well, now completing work up for hypoglycemia.    # Chronic left foot peripheral artery disease + dry gangrene  - Patient with chronic wound of left foot with gangrenous changes.   - Doppler US showed Moderate disease in the left SFA, No flow noted in the left posterior tibial artery   - Patient was not on any antiplatelets or statin.   - Evaluated by vascular surgery, s/p left BKA   -Post op care as per Surgical team recommendations.  -  repeat another vanco trough today prior to next dose  - continue  IV Ceftriaxone and IV Flagyl tx.- as per  ID  recommendations  -will complete antibiotics 48 hours post stump closure.      # hypoglycemia- ? possibly due to severe hypothyroidism , less likely ? adrenal insufficiency, ?? pituitary tumor  -  C-peptite, beta-hydroxybutyrate, sulfonylurea, serum proinsulin- no insulinoma  -? in setting of hypothyroidism,   - low to normal serum cortisol levels- s/p cosyntropin stimulation test today- f/up results.  -for now continue dextrose infusion at low rate. with close bsfs monitoring    # Elevated Prolactin level- he is not on any meds to contribute to elevation of prolactin  - on previous admission - he had non-contrast MRI of brain july 2019- edema was noted, no pituitary gland abnormality, will reorder MRI of brain to review pituitary gland and resolution of edema.      # Poor PO intake- patient was eating today w/o difficulty.  - Patient with occasional difficulty swallowing at home for solid food only  - started on appetite stimulant    - Hypotension- resolved post decrease of opioids dose tx.      Anemia due to Iron deficiency and CKD  - s/p 1 unit pRBC 11/27/19    -h/h remains low stable, started on PO iron tx.  - folate- borderline low and vitamin B 12 level- normal  -added on daily folate tx.    # SILVINA on CKD stage 4 + HAGMA  - renal function at baseline   -Nephrology on Board.  - on Oral Bicarb supplement.  - continue renal diet  -avoid nephrotoxic agents.      #h/o  HTN- continue current meds      # Opioid dependance  - Patient at home was taking  methadone 80 mg QID + Oxycontin 60 mg QID. He was clearly oversedated on admission, now more awake, improved appetite, not in pain, patient was seen by Pain management specialist - methadone dose was decreased to 80 mg orally three times daily and oxycodone dose was increased to 50 mg every 6 hours as needed for pain.      # Chronic sacral bedsore.  - Followed by Dr. Gooden    # Hypothyroidism- severe uncontrolled-repeated TSH worsening, patient still not responding to Synthroid, will add Liothyronine 25 mg po once daily.  - patient was on levothyroxine 50 mcg daily( November26 till 30th), increased to 100 MCG po once daily(December 01- till 11th) , will need to repeat thyroid profile in 2 weeks.   - PTO abx- negative, thyroglobulin level- negative  US thyroid- no nodules    #hypomagnesemia- supplemented    # Severe Vitamin D deficiency- on vitamin D tx.    daily DVT proph.    #Progress Note Handoff: monitor bsfs, obtain MRI of brain. f/up sx. for post op BKA site care- closure, patient can have surgical closure as long as he is receiving dextrose infusion tx., f/up cosyntropin stimulation test results.   Family discussion: medical plan of tx. d/w pt. Disposition: to be determined

## 2019-12-13 NOTE — PROGRESS NOTE ADULT - SUBJECTIVE AND OBJECTIVE BOX
Pain Management Progress Note -     Mr. Booker is a 60 yo male patient with Pmhx of PVD s/p R AKA, paraplegia with multiple chronic stage IV bedsores and suprapubic catheter, HTN (not on any medication), CKD stage IV (not following with nephrology and refusing HD), hypothyroidism, opioid dependance for pain. Patient with complaint of pain to the sacrum and buttocks to where the ulcers are present.     sulfamethizole (Unknown)      COLD FOOT WITH PERIPHERAL VASCULAR DISEASE  ^COLD FOOT WITH PERIPHERAL VASCULAR DISEASE  No pertinent family history in first degree relatives  Handoff  MEWS Score  Anemia  PAD (peripheral artery disease)  Hypertension  Suprapubic catheter  Pressure ulcer  Diabetes  Lumbar compression fracture  Infected wound  Infected wound  Cold foot with peripheral vascular disease  Pressure ulcer  Deep incision of infected bone of foot  Guillotine amputation below knee  S/P below knee amputation, right  S/P debridement  Toe amputation status, right  H/O hernia repair  No significant past surgical history  CIRCULATION IN LEG/PAINFUL  36      chlorhexidine 4% Liquid  oxybutynin  calcium carbonate    500 mG (Tums) Chewable  ascorbic acid  dextrose 40% Gel  dextrose 50% Injectable  dextrose 50% Injectable  dextrose 50% Injectable  glucagon  Injectable  ferrous    sulfate  levothyroxine  folic acid  cefTRIAXone   IVPB  metroNIDAZOLE  IVPB  mupirocin 2% Ointment  ergocalciferol  dronabinol  sodium bicarbonate  liothyronine  dextrose 5% + sodium chloride 0.9%.  heparin  Injectable  acetaminophen    Suspension ..  amitriptyline  methadone    Tablet  oxyCODONE    IR  diazepam    Tablet      acetaminophen    Suspension .. 650 milliGRAM(s) Oral every 6 hours  amitriptyline 25 milliGRAM(s) Oral at bedtime  ascorbic acid 500 milliGRAM(s) Oral daily  calcium carbonate    500 mG (Tums) Chewable 1 Tablet(s) Chew every 8 hours  cefTRIAXone   IVPB 2000 milliGRAM(s) IV Intermittent every 24 hours  chlorhexidine 4% Liquid 1 Application(s) Topical <User Schedule>  dextrose 40% Gel 15 Gram(s) Oral once PRN  dextrose 5% + sodium chloride 0.9%. 1000 milliLiter(s) IV Continuous <Continuous>  dextrose 50% Injectable 12.5 Gram(s) IV Push once  dextrose 50% Injectable 25 Gram(s) IV Push once  dextrose 50% Injectable 25 Gram(s) IV Push once  diazepam    Tablet 5 milliGRAM(s) Oral three times a day PRN  dronabinol 2.5 milliGRAM(s) Oral two times a day  ergocalciferol 37426 Unit(s) Oral every week  ferrous    sulfate 325 milliGRAM(s) Oral two times a day  folic acid 1 milliGRAM(s) Oral daily  glucagon  Injectable 1 milliGRAM(s) IntraMuscular once PRN  heparin  Injectable 5000 Unit(s) SubCutaneous every 8 hours  levothyroxine 100 MICROGram(s) Oral daily  liothyronine 25 MICROGram(s) Oral daily  methadone    Tablet 80 milliGRAM(s) Oral every 8 hours  metroNIDAZOLE  IVPB 500 milliGRAM(s) IV Intermittent every 8 hours  mupirocin 2% Ointment 1 Application(s) Topical two times a day  oxybutynin 10 milliGRAM(s) Oral two times a day  oxyCODONE    IR 50 milliGRAM(s) Oral every 6 hours PRN  sodium bicarbonate 650 milliGRAM(s) Oral three times a day      12-13 @ 04:3018 mL/min/1.73M2<L>          Hemoglobin: 9.8 g/dL (12-13 @ 04:30)  Hemoglobin: 9.9 g/dL (12-12 @ 06:57)        T(C): 36.3 (12-13-19 @ 12:39), Max: 37 (12-12-19 @ 20:30)  HR: 82 (12-13-19 @ 12:39) (80 - 86)  BP: 164/81 (12-13-19 @ 12:39) (140/70 - 172/100)  RR: 18 (12-13-19 @ 06:18) (18 - 18)  SpO2: 100% (12-13-19 @ 06:18) (99% - 100%)  Wt(kg): --     REVIEW OF SYSTEMS    General:	NAD   Skin/Breast:	Neg  Ophthalmologic:	Neg  ENMT: Neg	  Respiratory and Thorax: Neg	  Cardiovascular:	Neg  Gastrointestinal:	Neg  Genitourinary:	Neg  Musculoskeletal:	Neg  Neurological:	Neg  Psychiatric:	Neg  Hematology/Lymphatics:	Neg  Endocrine:	Neg        PHYSICAL EXAM:    GENERAL: NAD, well-groomed, well-developed  HEAD:  Atraumatic, Normocephalic  EYES: EOMI, PERRLA, conjunctiva and sclera clear  ENMT: No tonsillar erythema, exudates, or enlargement; Moist mucous membranes, Good dentition, No lesions  NECK: Supple, No JVD, Normal thyroid  NERVOUS SYSTEM:  Alert & Oriented X3, Good concentration; Motor Strength 5/5 B/L upper and lower extremities; DTRs 2+ intact and symmetric  CHEST/LUNG: Clear to percussion bilaterally; No rales, rhonchi, wheezing, or rubs  HEART: Regular rate and rhythm; No murmurs, rubs, or gallops  ABDOMEN: Soft, Nontender, Nondistended; Bowel sounds present  EXTREMITIES:  2+ Peripheral Pulses, No clubbing, cyanosis, or edema  LYMPH: No lymphadenopathy noted  SKIN: No rashes or lesions

## 2019-12-13 NOTE — PROGRESS NOTE ADULT - PROBLEM SELECTOR PLAN 1
Start acetaminophen    Suspension .. 650 milliGRAM(s) Oral every 6 hours  Start Amitriptyline 25 milliGRAM(s) Oral at bedtime  Con't Diazepam    Tablet 5 milliGRAM(s) Oral three times a day PRN  Con't Dronabinol 2.5 milliGRAM(s) Oral two times a day  Change Methadone    Tablet 80 milliGRAM(s) Oral every 8 hours  Change OxyCODONE    IR 50 milliGRAM(s) Oral every 6 hours PRN

## 2019-12-13 NOTE — PROGRESS NOTE ADULT - ASSESSMENT
59/M with SILVINA on CKD 4 (baseline creat 3.8 mg% Oct 2019) SPC, severe PVD, presented with cold Lt LE and ulcers, found to have severe anemia Hb 6.8 and acidosis.    # non oliguric  # creatinine stable   # phosphorus  noted , off Phoslo patient with poor po intake  PTH noted, no need for 1-25 vit D   # h/h noted, no need for venofer sat in the 30 range, no need for LINDY will start once hb <9  #ID notes appreciated on  rocephin and flagyl ,  # patient with significant decrease in GFR in view of low muscle mass ( amputations)/ refused HD in the past  #cont   sodium bicarbonate po  to q 8 AND KEEP BICARB between 21 and 27   # vascular notes appreciated, closure of BKA delayed pending stability of blood glucose   # repeated TSH noted, endo evaluation pending on levothyroxine and liiothyronine   # will sign off recall if any question arises

## 2019-12-14 NOTE — PROGRESS NOTE ADULT - SUBJECTIVE AND OBJECTIVE BOX
seen and examined  remains with poor intake, but drinking more liquids         PAST HISTORY  --------------------------------------------------------------------------------  No significant changes to PMH, PSH, FHx, SHx, unless otherwise noted    ALLERGIES & MEDICATIONS  --------------------------------------------------------------------------------  Allergies    sulfamethizole (Unknown)          Standing Inpatient Medications  acetaminophen    Suspension .. 650 milliGRAM(s) Oral every 6 hours  amitriptyline 25 milliGRAM(s) Oral at bedtime  ascorbic acid 500 milliGRAM(s) Oral daily  calcium carbonate    500 mG (Tums) Chewable 1 Tablet(s) Chew every 8 hours  cefTRIAXone   IVPB 2000 milliGRAM(s) IV Intermittent every 24 hours  chlorhexidine 4% Liquid 1 Application(s) Topical <User Schedule>  dextrose 5% + sodium chloride 0.9%. 1000 milliLiter(s) IV Continuous <Continuous>  dextrose 50% Injectable 12.5 Gram(s) IV Push once  dextrose 50% Injectable 25 Gram(s) IV Push once  dextrose 50% Injectable 25 Gram(s) IV Push once  dronabinol 2.5 milliGRAM(s) Oral two times a day  ergocalciferol 12238 Unit(s) Oral every week  ferrous    sulfate 325 milliGRAM(s) Oral two times a day  folic acid 1 milliGRAM(s) Oral daily  heparin  Injectable 5000 Unit(s) SubCutaneous every 8 hours  levothyroxine 100 MICROGram(s) Oral daily  liothyronine 25 MICROGram(s) Oral daily  magnesium sulfate  IVPB 1 Gram(s) IV Intermittent once  methadone    Tablet 80 milliGRAM(s) Oral every 8 hours  metroNIDAZOLE  IVPB 500 milliGRAM(s) IV Intermittent every 8 hours  mupirocin 2% Ointment 1 Application(s) Topical two times a day  oxybutynin 10 milliGRAM(s) Oral two times a day  sodium bicarbonate 650 milliGRAM(s) Oral three times a day    PRN Inpatient Medications  dextrose 40% Gel 15 Gram(s) Oral once PRN  diazepam    Tablet 5 milliGRAM(s) Oral three times a day PRN  glucagon  Injectable 1 milliGRAM(s) IntraMuscular once PRN  oxyCODONE    IR 50 milliGRAM(s) Oral every 6 hours PRN            VITALS/PHYSICAL EXAM  --------------------------------------------------------------------------------  T(C): 36.2 (12-14-19 @ 06:03), Max: 36.3 (12-13-19 @ 12:39)  HR: 87 (12-14-19 @ 06:03) (80 - 87)  BP: 137/83 (12-14-19 @ 06:03) (137/82 - 164/81)  RR: 18 (12-14-19 @ 06:03) (18 - 18)  SpO2: 100% (12-14-19 @ 08:20) (100% - 100%)  Wt(kg): --        12-13-19 @ 07:01  -  12-14-19 @ 07:00  --------------------------------------------------------  IN: 450 mL / OUT: 2250 mL / NET: -1800 mL      Physical Exam:  	Gen: NAD  	Pulm: decrease BS  B/L  	CV: S1S2; no rub  	Abd: +distended  	: spc  	LE: b/l bka       LABS/STUDIES  --------------------------------------------------------------------------------              9.9    9.55  >-----------<  280      [12-14-19 @ 06:23]              32.5     133  |  103  |  33  ----------------------------<  55      [12-14-19 @ 06:23]  4.5   |  19  |  3.4        Ca     7.9     [12-14-19 @ 06:23]      Mg     1.7     [12-14-19 @ 06:23]      Phos  4.2     [12-14-19 @ 06:23]      Creatinine Trend:  SCr 3.4 [12-14 @ 06:23]  SCr 3.5 [12-13 @ 04:30]  SCr 3.6 [12-12 @ 06:57]  SCr 3.5 [12-11 @ 07:19]  SCr 3.5 [12-10 @ 06:06]    Urinalysis - [11-27-19 @ 11:58]      Color Light Yellow / Appearance Slightly Turbid / SG 1.007 / pH 6.5      Gluc Trace / Ketone Negative  / Bili Negative / Urobili <2 mg/dL       Blood Trace / Protein 300 mg/dL / Leuk Est Large / Nitrite Positive      RBC 1 / WBC 59 / Hyaline 4 / Gran  / Sq Epi  / Non Sq Epi 1 / Bacteria Many      Iron 27, TIBC 89, %sat 30      [11-27-19 @ 06:17]  Ferritin 679      [11-27-19 @ 06:17]  PTH -- (Ca 7.8)      [11-28-19 @ 06:29]   99  PTH -- (Ca 7.0)      [10-14-19 @ 19:45]   209  PTH -- (Ca 7.8)      [07-02-19 @ 10:54]   71  Vitamin D (25OH) <5      [10-14-19 @ 19:45]  HbA1c 5.5      [06-27-19 @ 06:35]  TSH 87.60      [12-13-19 @ 04:30]

## 2019-12-14 NOTE — PROGRESS NOTE ADULT - ASSESSMENT
59/M with SILVINA on CKD 4 (baseline creat 3.8 mg% Oct 2019) SPC, severe PVD, presented with cold Lt LE and ulcers, found to have severe anemia Hb 6.8 and acidosis.    # non oliguric  # creatinine stable   # h/h noted, no need for venofer sat in the 30 range, no need for LINDY will start once hb <9  #remains on ATB   # ph at goal, check albumin to correct calcium   # patient with significant decrease in GFR in view of low muscle mass ( amputations)/ refused HD in the past  #increase sodium bicarbonate to q 6   # vascular notes appreciated, closure of BKA delayed pending stability of blood glucose   # repeated TSH noted, endo evaluation pending on levothyroxine and liiothyronine

## 2019-12-14 NOTE — PROGRESS NOTE ADULT - SUBJECTIVE AND OBJECTIVE BOX
MARGE BELLA  [facility] T2-3A 024 B ([facility] T2-[facility])            Patient was evaluated and examined  by bedside, c/o chronic body pain, glucose remains low, appetite is good.        REVIEW OF SYSTEMS:  please see pertinent positives mentioned above, all other 12 ROS negative      T(C): , Max: 36.2 (12-14-19 @ 06:03)  HR: 99 (12-14-19 @ 13:06)  BP: 152/71 (12-14-19 @ 13:06)  RR: 18 (12-14-19 @ 13:06)  SpO2: 100% (12-14-19 @ 08:20)  CAPILLARY BLOOD GLUCOSE      POCT Blood Glucose.: 264 mg/dL (14 Dec 2019 11:58)  POCT Blood Glucose.: 134 mg/dL (14 Dec 2019 07:49)  POCT Blood Glucose.: 82 mg/dL (14 Dec 2019 01:46)  POCT Blood Glucose.: 119 mg/dL (13 Dec 2019 20:06)  POCT Blood Glucose.: 75 mg/dL (13 Dec 2019 18:16)  POCT Blood Glucose.: 71 mg/dL (13 Dec 2019 16:54)      PHYSICAL EXAM:  General: NAD, AAOX3, patient is laying comfortably in bed  HEENT: AT, NC, Supple, NO JVD, NO CB  Lungs: CTA B/L, no wheezing, no rhonchi  CVS: normal S1, S2, RRR, NO M/G/R  Abdomen: soft, bowel sounds present, non-tender, non-distended  Extremities: b/l BKA status, no edema, no clubbing, no cyanosis, positive peripheral pulses b/l  Neuro: no acute focal neurological deficits  Skin: chronic sacral decubiti, no rash, no ecchymosis, tattoes present      LAB  CBC  Date: 12-14-19 @ 06:23  Mean cell Xxsefsfuol23.5  Mean cell Hemoglobin Conc30.5  Mean cell Volum 90.3  Platelet count-Automate 280  RBC Count 3.60  Red Cell Distrib Width14.5  WBC Count9.55  % Albumin, Urine--  Hematocrit 32.5  Hemoglobin 9.9  CBC  Date: 12-13-19 @ 04:30  Mean cell Shsuaaovgc09.9  Mean cell Hemoglobin Conc31.1  Mean cell Volum 89.7  Platelet count-Automate 253  RBC Count 3.51  Red Cell Distrib Width14.6  WBC Count10.03  % Albumin, Urine--  Hematocrit 31.5  Hemoglobin 9.8  CBC  Date: 12-12-19 @ 06:57  Mean cell Kiuxrfbhlc13.0  Mean cell Hemoglobin Conc31.0  Mean cell Volum 90.1  Platelet count-Automate 229  RBC Count 3.54  Red Cell Distrib Width14.3  WBC Count12.54  % Albumin, Urine--  Hematocrit 31.9  Hemoglobin 9.9  CBC  Date: 12-11-19 @ 07:19  Mean cell Dzzagbjfxn77.8  Mean cell Hemoglobin Conc30.6  Mean cell Volum 90.9  Platelet count-Automate 182  RBC Count 3.52  Red Cell Distrib Width14.6  WBC Count8.63  % Albumin, Urine--  Hematocrit 32.0  Hemoglobin 9.8  CBC  Date: 12-10-19 @ 06:06  Mean cell Ddjeyuperv62.7  Mean cell Hemoglobin Conc30.5  Mean cell Volum 90.7  Platelet count-Automate 176  RBC Count 3.43  Red Cell Distrib Width14.5  WBC Count8.83  % Albumin, Urine--  Hematocrit 31.1  Hemoglobin 9.5  CBC  Date: 12-09-19 @ 16:57  Mean cell Iabyywcpso13.7  Mean cell Hemoglobin Conc30.6  Mean cell Volum 90.5  Platelet count-Automate 166  RBC Count 3.36  Red Cell Distrib Width14.5  WBC Count9.26  % Albumin, Urine--  Hematocrit 30.4  Hemoglobin 9.3  CBC  Date: 12-09-19 @ 06:28  Mean cell Laljdogxfp02.8  Mean cell Hemoglobin Conc30.7  Mean cell Volum 90.4  Platelet count-Automate 168  RBC Count 3.53  Red Cell Distrib Width14.5  WBC Count8.82  % Albumin, Urine--  Hematocrit 31.9  Hemoglobin 9.8  CBC  Date: 12-08-19 @ 07:56  Mean cell Pejttujqln18.3  Mean cell Hemoglobin Conc31.5  Mean cell Volum 89.9  Platelet count-Automate 163  RBC Count 3.36  Red Cell Distrib Width14.6  WBC Count8.86  % Albumin, Urine--  Hematocrit 30.2  Hemoglobin 9.5    BMP  12-14-19 @ 06:23  Blood Gas Arterial-Calcium,Ionized--  Blood Urea Nitrogen, Serum 33 mg/dL<H> [10 - 20]  Carbon Dioxide, Serum19 mmol/L [17 - 32]  Chloride, Anvut088 mmol/L [98 - 110]  Creatinie, Serum3.4 mg/dL<H> [0.7 - 1.5]  Glucose, Serum55 mg/dL<L> [70 - 99]  Potassium, Serum4.5 mmol/L [3.5 - 5.0]  Sodium, Serum 133 mmol/L<L> [135 - 146]  [facility]  12-13-19 @ 04:30  Blood Gas Arterial-Calcium,Ionized--  Blood Urea Nitrogen, Serum 35 mg/dL<H> [10 - 20]  Carbon Dioxide, Serum18 mmol/L [17 - 32]  Chloride, Khtbq106 mmol/L [98 - 110]  Creatinie, Serum3.5 mg/dL<H> [0.7 - 1.5]  Glucose, Serum63 mg/dL<L> [70 - 99]  Potassium, Serum4.4 mmol/L [3.5 - 5.0]  Sodium, Serum 134 mmol/L<L> [135 - 146]  [facility]  12-12-19 @ 06:57  Blood Gas Arterial-Calcium,Ionized--  Blood Urea Nitrogen, Serum 36 mg/dL<H> [10 - 20]  Carbon Dioxide, Serum18 mmol/L [17 - 32]  Chloride, Uiwyn269 mmol/L [98 - 110]  Creatinie, Serum3.6 mg/dL<H> [0.7 - 1.5]  Glucose, Serum81 mg/dL [70 - 99]  Potassium, Serum4.4 mmol/L [3.5 - 5.0]  Sodium, Serum 135 mmol/L [135 - 146]  [facility]  12-11-19 @ 07:19  Blood Gas Arterial-Calcium,Ionized--  Blood Urea Nitrogen, Serum 37 mg/dL<H> [10 - 20]  Carbon Dioxide, Serum19 mmol/L [17 - 32]  Chloride, Xqqwc263 mmol/L [98 - 110]  Creatinie, Serum3.5 mg/dL<H> [0.7 - 1.5]  Glucose, Serum63 mg/dL<L> [70 - 99]  Potassium, Serum4.7 mmol/L [3.5 - 5.0]  Sodium, Serum 135 mmol/L [135 - 146]  [facility]  12-10-19 @ 06:06  Blood Gas Arterial-Calcium,Ionized--  Blood Urea Nitrogen, Serum 36 mg/dL<H> [10 - 20]  Carbon Dioxide, Serum18 mmol/L [17 - 32]  Chloride, Dsqul119 mmol/L [98 - 110]  Creatinie, Serum3.5 mg/dL<H> [0.7 - 1.5]  Glucose, Serum54 mg/dL<L> [70 - 99]  Potassium, Serum4.2 mmol/L [3.5 - 5.0]  Sodium, Serum 139 mmol/L [135 - 146]  BMP  12-09-19 @ 16:57  Blood Gas Arterial-Calcium,Ionized--  Blood Urea Nitrogen, Serum 39 mg/dL<H> [10 - 20]  Carbon Dioxide, Serum20 mmol/L [17 - 32]  Chloride, Ykjcj204 mmol/L [98 - 110]  Creatinie, Serum3.7 mg/dL<H> [0.7 - 1.5]  Glucose, Serum88 mg/dL [70 - 99]  Potassium, Serum4.2 mmol/L [3.5 - 5.0]  Sodium, Serum 136 mmol/L [135 - 146]              Microbiology:    Culture - Blood (collected 11-27-19 @ 00:30)  Source: .Blood Blood-Peripheral  Final Report (12-02-19 @ 07:01):    No growth at 5 days.        Medications:  acetaminophen    Suspension .. 650 milliGRAM(s) Oral every 6 hours  amitriptyline 25 milliGRAM(s) Oral at bedtime  ascorbic acid 500 milliGRAM(s) Oral daily  calcium carbonate    500 mG (Tums) Chewable 1 Tablet(s) Chew every 8 hours  cefTRIAXone   IVPB 2000 milliGRAM(s) IV Intermittent every 24 hours  chlorhexidine 4% Liquid 1 Application(s) Topical <User Schedule>  dextrose 40% Gel 15 Gram(s) Oral once PRN  dextrose 5% + sodium chloride 0.9%. 1000 milliLiter(s) IV Continuous <Continuous>  dextrose 50% Injectable 12.5 Gram(s) IV Push once  dextrose 50% Injectable 25 Gram(s) IV Push once  dextrose 50% Injectable 25 Gram(s) IV Push once  diazepam    Tablet 5 milliGRAM(s) Oral three times a day PRN  dronabinol 2.5 milliGRAM(s) Oral two times a day  ergocalciferol 92917 Unit(s) Oral every week  ferrous    sulfate 325 milliGRAM(s) Oral two times a day  folic acid 1 milliGRAM(s) Oral daily  glucagon  Injectable 1 milliGRAM(s) IntraMuscular once PRN  heparin  Injectable 5000 Unit(s) SubCutaneous every 8 hours  hydrocortisone 20 milliGRAM(s) Oral daily  levothyroxine 100 MICROGram(s) Oral daily  liothyronine 25 MICROGram(s) Oral daily  methadone    Tablet 80 milliGRAM(s) Oral every 8 hours  metroNIDAZOLE  IVPB 500 milliGRAM(s) IV Intermittent every 8 hours  mupirocin 2% Ointment 1 Application(s) Topical two times a day  oxybutynin 10 milliGRAM(s) Oral two times a day  oxyCODONE    IR 50 milliGRAM(s) Oral every 6 hours PRN  sodium bicarbonate 650 milliGRAM(s) Oral three times a day        Assessment and Plan:  Mr. Bella is a 58 yo male patient with PMHx of PVD s/p Right AKA, paraplegia with multiple chronic bedsores and chronic  suprapubic catheter, HTN (not on any medication), CKD stage IV (not following with nephrology), hypothyroidism, opioid dependance, recent admission due to unresponsiveness, presents for 3-4 days of left foot coldness, pain and worsening ulcers, patient was dx. with left foot gangrene underwent left foot BKA, post op recovering well, now completing work up for hypoglycemia.    # Chronic left foot peripheral artery disease + dry gangrene  - Patient with chronic wound of left foot with gangrenous changes.   - Doppler US showed Moderate disease in the left SFA, No flow noted in the left posterior tibial artery   - Patient was not on any antiplatelets or statin.   - Evaluated by vascular surgery, s/p left BKA   -Post op care as per Surgical team recommendations.  -  repeat another vanco trough today prior to next dose  - continue  IV Ceftriaxone and IV Flagyl tx.- as per  ID  recommendations  -will complete antibiotics 48 hours post stump closure.      # hypoglycemia- ? possibly due to severe hypothyroidism , less likely ? adrenal insufficiency, no  pituitary tumor- on repeated MRI of brain  -  C-peptite, beta-hydroxybutyrate, sulfonylurea, serum proinsulin- no insulinoma  -? in setting of hypothyroidism,   - low to normal serum cortisol levels- s/p cosyntropin stimulation test today- f/up results.  -for now continue dextrose infusion at low rate. with close bsfs monitoring  -start patient on trial of hydrocortisone 20 mg po once daily    # Elevated Prolactin level- he is not on any meds to contribute to elevation of prolactin  - repeated MRI of brain- no pituitary gland adenoma.      # Poor PO intake- patient is  eating  w/o difficulty for multiple days.  - Patient with occasional difficulty swallowing at home for solid food only  - continue on  appetite stimulant    - Hypotension- resolved post decrease of opioids dose tx.      Anemia due to Iron deficiency and CKD  - s/p 1 unit pRBC 11/27/19    -h/h remains low stable, started on PO iron tx.  - folate- borderline low and vitamin B 12 level- normal  -added on daily folate tx.    # SILVINA on CKD stage 4 + HAGMA  - renal function at baseline   -Nephrology on Board.  - on Oral Bicarb supplement.  - continue renal diet  -avoid nephrotoxic agents.      #h/o  HTN- continue current meds      # Opioid dependance  - Patient at home was taking  methadone 80 mg QID + Oxycontin 60 mg QID. He was clearly oversedated on admission, now more awake, improved appetite, not in pain, patient was seen by Pain management specialist - methadone dose was decreased to 80 mg orally three times daily and oxycodone dose was increased to 50 mg every 6 hours as needed for pain.      # Chronic sacral bedsore.  - Followed by Dr. Gooden    # Hypothyroidism- severe uncontrolled-repeated TSH worsening, patient still not responding to Synthroid, will add Liothyronine 25 mg po once daily.  - patient was on levothyroxine 50 mcg daily( November26 till 30th), increased to 100 MCG po once daily(December 01- till 11th) , will need to repeat thyroid profile in 2 weeks.   - PTO abx- negative, thyroglobulin level- negative  US thyroid- no nodules    #hypomagnesemia- supplemented    # Severe Vitamin D deficiency- on vitamin D tx.    daily DVT proph.    #Progress Note Handoff: monitor bsfs, started on trial of hydrocortisone. f/up sx. for post op BKA site care- closure, patient can have surgical closure as long as he is receiving dextrose infusion tx., f/up cosyntropin stimulation test results.   Family discussion: medical plan of tx. d/w pt. Disposition: to be determined .

## 2019-12-15 NOTE — CHART NOTE - NSCHARTNOTEFT_GEN_A_CORE
To OR 12/16 with Vascular Surgery  Procedure : Closure of L BKA    Pre-op labs: cbc, BMP, Mg, PT/INR/PTT and T&S  CXR, EKG

## 2019-12-15 NOTE — PROGRESS NOTE ADULT - ATTENDING COMMENTS
Mr. Booker is a 58 yo male patient with PMHx of PVD s/p Right AKA, paraplegia with multiple chronic bedsores and chronic  suprapubic catheter, HTN (not on any medication), CKD stage IV (not following with nephrology), hypothyroidism, opioid dependance, recent admission due to unresponsiveness, presents for 3-4 days of left foot coldness, pain and worsening ulcers, patient was dx. with left foot gangrene underwent left foot BKA, post op recovering well, now completing work up for hypoglycemia.    # Chronic left foot peripheral artery disease + dry gangrene  - Patient with chronic wound of left foot with gangrenous changes.   - Doppler US showed Moderate disease in the left SFA, No flow noted in the left posterior tibial artery   - Patient was not on any antiplatelets or statin.   - Evaluated by vascular surgery, s/p left BKA   -Post op care as per Surgical team recommendations.  -  patient had high vanco level, repeat another vanco trough tomorrow morning.  - continue  IV Ceftriaxone and IV Flagyl tx.- as per  ID  recommendations  -will complete antibiotics 48 hours post stump closure.      # hypoglycemia- ? possibly due to severe hypothyroidism , less likely ? adrenal insufficiency, no  pituitary tumor- on repeated MRI of brain  -  C-peptite, beta-hydroxybutyrate, sulfonylurea, serum proinsulin- no insulinoma  -? in setting of hypothyroidism,   - low to normal serum cortisol levels- s/p cosyntropin stimulation test today- f/up results.  -for now continue dextrose infusion at low rate. with close bsfs monitoring  -start patient on trial of hydrocortisone 20 mg po once daily(day 2)    # Elevated Prolactin level- he is not on any meds to contribute to elevation of prolactin  - repeated MRI of brain- no pituitary gland adenoma.      # Poor PO intake- patient is  eating  w/o difficulty for multiple days.  - Patient with occasional difficulty swallowing at home for solid food only  - continue on  appetite stimulant    - Hypotension- resolved post decrease of opioids dose tx.      Anemia due to Iron deficiency and CKD  - s/p 1 unit pRBC 11/27/19    -h/h remains low stable, started on PO iron tx.  - folate- borderline low and vitamin B 12 level- normal  -added on daily folate tx.    # SILVINA on CKD stage 4 + HAGMA  - renal function at baseline   -Nephrology on Board.  - on Oral Bicarb supplement.  - continue renal diet  -avoid nephrotoxic agents.      #h/o  HTN- continue current meds      # Opioid dependance  - Patient at home was taking  methadone 80 mg QID + Oxycontin 60 mg QID. He was clearly oversedated on admission, now more awake, improved appetite, not in pain, patient was seen by Pain management specialist - methadone dose was decreased to 80 mg orally three times daily and oxycodone dose was increased to 50 mg every 6 hours as needed for pain.      # Chronic sacral bedsore.  - Followed by Dr. Gooden    # Hypothyroidism- severe uncontrolled-repeated TSH worsening, patient still not responding to Synthroid, will add Liothyronine 25 mg po once daily.  - patient was on levothyroxine 50 mcg daily( November26 till 30th), increased to 100 MCG po once daily(December 01- till 11th) , will need to repeat thyroid profile in 2 weeks.   - PTO abx- negative, thyroglobulin level- negative  US thyroid- no nodules    #hypomagnesemia- supplemented    # Severe Vitamin D deficiency- on vitamin D tx.    daily DVT proph.    #Progress Note Handoff: monitor bsfs, started on trial of hydrocortisone. f/up sx. for post op BKA site care- closure, patient can have surgical closure as long as he is receiving dextrose infusion tx., f/up cosyntropin stimulation test results.   Family discussion: medical plan of tx. d/w pt. Disposition: to be determined .

## 2019-12-15 NOTE — PROGRESS NOTE ADULT - ASSESSMENT
Mr. Booker is a 58 yo male patient with PMHx of PVD s/p Right AKA months ago, paraplegia with multiple chronic bedsores and suprapubic catheter, HTN (not on any medication), CKD stage IV (not following with nephrology), hypothyroidism, opioid dependance, recent admission due to unresponsiveness, presents for 3-4 days of left foot coldness, pain and worsening ulcers.    # Chronic left foot peripheral artery disease + dry gangrene  - S/p BKA on 12/2/2019  - Closure was canceled on 12/5/2019 due to hypoglycemia, now scheduled for 12/16/2019.  - will keep on maintenance dextrose fluids, please hold off if O2 sat <92 percent  - patient can have surgical closure as long as he is receiving dextrose infusion tx as per attending  - ABx till closure of BKA. 48h post closure could hold all ABX    # Hypoglycemia  - hypoglycemia any-operatively  - on D5 NS 50 cc/hour, pt with low baseline po intake  - Hypoglycemia workup: Insulin low, C pep normal, B-hydroxybutyrate normal,   - Endo consulted - Etiology remarkably malnourished (low albumin, vitamin D, iron) Vs Methadone induced Vs adrenal insufficiency - severe hypothyroidism , less likely ? adrenal insufficiency, ?? pituitary tumor - MR brain ordered  - Nutrition conuslt - malnourishment cannot induce hypoglycemia - on merinol now   - Standard High dose adrenal insufficiency (250mcg) test performed. waiting for result, MRI pitutary ordered    # Poor PO intake, difficulty swallowing  - dysphagia is not pharyngeal  - pt not a candidate for Barium esophagram per Radiology  - cannot tolerate PO contrast without projectile vomiting  - GI: Pt would benefits from EGD. After hypoglycemia corrected and after surgery, will do Outpatient  - added marinol, will make PPI BID, colonoscopy as outpatient    # Hypothyroidism  - will need to re-check TSH am  - antibody work up sent: - thyroglobulin and thyroperoxidase negative thus far  - Thyroid ultrasounds: negative  - levothyroxine to 100 mcg daily    # Sacral OM and gangrene  - made Valium PRN due to low bp  - vancomycin on hold for high vanc trough  - Flagyl 500 mg iv q8h  - Rocephin  - ABx till closure of BKA. 48h post closure could hold all     # SILVINA on CKD + HAGMA  - non- oliguric  - patient with significant decrease in GFR in view of low muscle mass ( amputations)  - increase  sodium bicarbonate po  to q 8   - patient is refusing hd , no acute   - can increase if Anion gap increases  - h/h noted, no need for venofer sat in the 30 range, no need for LINDY will start once h <9  - f/u Phos, f/u Mag, f/u bmp    # Normocytic anemia  - Iron deficiency and CKD,  multifactorial  - s/p 1 unit pRBC on 11/27  - Patient denies any melena, or hematochezia, noted history of hemorrhoids  - Will trend CBC    # HTN  - not on any medication  - off Amlodipine  Echo results 55-60% , grade 1 pritesh dysfunction    # Opioid dependance  - Patient was on methadone 80 mg QID + Oxycodone 60mg QID  - was oversedated on admission, awake after narcan  - high risk for overdose given CKD  - Patient is malingering to gain access to higher doses of oxycodone, which has side effects and contraindicated in his current condition  - Once his stump will heal, he should f/up at pain management clinic to be tapered off methadone and oxycodone tx.  - Pain management consulted: Methadone 80mg q8, Oxycodone IR 50 q6 PRN, Tylenol 50 q6, Amitriptyline 25mg qHS for now    # Folate acid deficiency  - on folic acid po qd    # Vitamin D def  - 4000 qd daily    # Chronic sacral bedsore.  - Followed by Dr. Gooden

## 2019-12-15 NOTE — PROGRESS NOTE ADULT - SUBJECTIVE AND OBJECTIVE BOX
LENGTH OF HOSPITAL STAY: 19d    CHIEF COMPLAINT:   Patient is a 59y old  Male who presents with a chief complaint of Cold left lower extremity (14 Dec 2019 13:36)      Overnight events:    No acute events overnight    ALLERGIES:  sulfamethizole (Unknown)    MEDICATIONS:  STANDING MEDICATIONS  acetaminophen    Suspension .. 650 milliGRAM(s) Oral every 6 hours  amitriptyline 25 milliGRAM(s) Oral at bedtime  ascorbic acid 500 milliGRAM(s) Oral daily  calcium carbonate    500 mG (Tums) Chewable 1 Tablet(s) Chew every 8 hours  cefTRIAXone   IVPB 2000 milliGRAM(s) IV Intermittent every 24 hours  chlorhexidine 4% Liquid 1 Application(s) Topical <User Schedule>  dextrose 5% + sodium chloride 0.9%. 1000 milliLiter(s) IV Continuous <Continuous>  dextrose 50% Injectable 12.5 Gram(s) IV Push once  dextrose 50% Injectable 25 Gram(s) IV Push once  dextrose 50% Injectable 25 Gram(s) IV Push once  dronabinol 2.5 milliGRAM(s) Oral two times a day  ergocalciferol 73092 Unit(s) Oral every week  ferrous    sulfate 325 milliGRAM(s) Oral two times a day  folic acid 1 milliGRAM(s) Oral daily  heparin  Injectable 5000 Unit(s) SubCutaneous every 8 hours  hydrocortisone 20 milliGRAM(s) Oral daily  levothyroxine 100 MICROGram(s) Oral daily  liothyronine 25 MICROGram(s) Oral daily  methadone    Tablet 80 milliGRAM(s) Oral every 8 hours  metroNIDAZOLE  IVPB 500 milliGRAM(s) IV Intermittent every 8 hours  mupirocin 2% Ointment 1 Application(s) Topical two times a day  oxybutynin 10 milliGRAM(s) Oral two times a day  sodium bicarbonate 650 milliGRAM(s) Oral three times a day    PRN MEDICATIONS  dextrose 40% Gel 15 Gram(s) Oral once PRN  diazepam    Tablet 5 milliGRAM(s) Oral three times a day PRN  glucagon  Injectable 1 milliGRAM(s) IntraMuscular once PRN  oxyCODONE    IR 50 milliGRAM(s) Oral every 6 hours PRN    VITALS:   T(F): 98.2  HR: 90  BP: 112/69  RR: 88  SpO2: 100%    LABS:                        9.2    9.42  )-----------( 277      ( 15 Dec 2019 06:19 )             29.9     12-15    136  |  105  |  32<H>  ----------------------------<  88  4.7   |  18  |  3.4<H>    Ca    7.7<L>      15 Dec 2019 06:19  Phos  4.4     12-15  Mg     1.8     12-15                      Cultures:      RADIOLOGY:    PHYSICAL EXAM:  GEN: No acute distress  HEENT: SUJIT  LUNGS: Clear to auscultation bilaterally   HEART: S1/S2 present. RRR.   ABD: Soft, non-tender, non-distended. Bowel sounds present  EXT:  R sided BKA noted, new left sided BKA ( dressing c/d/i)  NEURO: AAOX3

## 2019-12-16 NOTE — PROGRESS NOTE ADULT - ATTENDING COMMENTS
pt seen and examined independently:  +sleeping  Rt BKA, new Lt BKA    59M w/ PVD s/p Rt AKA, paraplegia with multiple chronic bedsores and chronic  suprapubic catheter, HTN, CKD4, hypothyroidism, opioid dependance on methadone, presents w/ lt foot coldness and gangrene.  He is s/p Lt BKA.      #Chronic left foot peripheral artery disease + dry gangrene - s/p Lt BKA, for BKA closure today.  continue rocephin and flagyl per ID recs x 48h after stump closure.      #recurrent severe hypoglycemia - likely due to severe hypothyroidism vs secondary adrenal insufficiency due to chronic opiates (less likely)??.  based on labs, doubt insulinoma, coritsol level not low.  f/u cosyntropin test.  started on hydrocortisone, will change to 10mg in AM, 5mg in PM.  continue on D5NS for now, close monitoring of FS, avoid hypoglycemia    #elevated prolactin - MRI pituitary ok  #severe protein calorie malnutrition/decreased PO intake - continue on marinol, encourage PO  #anemia of chronic disease and CKD - s/p 1U PRBC, hb stable  #folic acid deficiency - continue folic acid  #CKD4 - Cr stable, on PO bicarb  #HTN - bp ok, resume meds  #opioid dependence - continue on methadone and oxycodone,  per pain mgmt  #chronic sacral bedsores POA - f/u dr. pradhan  #hypothyroidism - w/ TSH 70, on synthroid and liothyronine, repeat TFT in a few weeks  #hypomagnesmia - replete PRN  #vitamin D deficiency - supplement  #PPx - HSQ    Progress Note Handoff  Pending:  for stump closure today  Patient/Family discussion: discussed w/ family at bedside  Disposition: unclear, will need PT after surgery

## 2019-12-16 NOTE — PROGRESS NOTE ADULT - SUBJECTIVE AND OBJECTIVE BOX
LENGTH OF HOSPITAL STAY: 20d    CHIEF COMPLAINT:   Patient is a 59y old  Male who presents with a chief complaint of Cold left lower extremity (15 Dec 2019 09:30)      Overnight events:    No acute events overnight    ALLERGIES:  sulfamethizole (Unknown)    MEDICATIONS:  STANDING MEDICATIONS  acetaminophen    Suspension .. 650 milliGRAM(s) Oral every 6 hours  amitriptyline 25 milliGRAM(s) Oral at bedtime  ascorbic acid 500 milliGRAM(s) Oral daily  calcium carbonate    500 mG (Tums) Chewable 1 Tablet(s) Chew every 8 hours  cefTRIAXone   IVPB 2000 milliGRAM(s) IV Intermittent every 24 hours  chlorhexidine 4% Liquid 1 Application(s) Topical <User Schedule>  dextrose 5% + sodium chloride 0.9%. 1000 milliLiter(s) IV Continuous <Continuous>  dextrose 50% Injectable 12.5 Gram(s) IV Push once  dextrose 50% Injectable 25 Gram(s) IV Push once  dextrose 50% Injectable 25 Gram(s) IV Push once  dronabinol 2.5 milliGRAM(s) Oral two times a day  ergocalciferol 09263 Unit(s) Oral every week  ferrous    sulfate 325 milliGRAM(s) Oral two times a day  folic acid 1 milliGRAM(s) Oral daily  heparin  Injectable 5000 Unit(s) SubCutaneous every 8 hours  hydrocortisone 20 milliGRAM(s) Oral daily  levothyroxine 100 MICROGram(s) Oral daily  liothyronine 25 MICROGram(s) Oral daily  methadone    Tablet 80 milliGRAM(s) Oral every 8 hours  metroNIDAZOLE  IVPB 500 milliGRAM(s) IV Intermittent every 8 hours  mupirocin 2% Ointment 1 Application(s) Topical two times a day  oxybutynin 10 milliGRAM(s) Oral two times a day  sodium bicarbonate 650 milliGRAM(s) Oral three times a day    PRN MEDICATIONS  dextrose 40% Gel 15 Gram(s) Oral once PRN  diazepam    Tablet 5 milliGRAM(s) Oral three times a day PRN  glucagon  Injectable 1 milliGRAM(s) IntraMuscular once PRN  oxyCODONE    IR 50 milliGRAM(s) Oral every 6 hours PRN    VITALS:   T(F): 98.5  HR: 95  BP: 159/87  RR: 17  SpO2: 99%    LABS:                        9.1    9.39  )-----------( 312      ( 16 Dec 2019 07:32 )             28.7     12-15    134<L>  |  104  |  33<H>  ----------------------------<  114<H>  5.0   |  17  |  3.4<H>    Ca    7.9<L>      15 Dec 2019 21:54  Phos  4.4     12-15  Mg     1.8     12-15    TPro  4.1<L>  /  Alb  1.2<L>  /  TBili  <0.2  /  DBili  x   /  AST  12  /  ALT  6   /  AlkPhos  143<H>  12-15                    Cultures:      RADIOLOGY:  < from: MR Head w/wo IV Cont (12.13.19 @ 14:34) >  Brain:   1.  No evidence of acute abnormality. Since the prior brain MRI 7/8/2019:  2.  Resolution of the previously seen bilateral occipital subcortical   edema, compatible with resolution of prior PRES.  3.  Interval development of a lacunar infarct within the left aspect of   the splenium, which is now chronic in appearance.  4.  Stable mild chronic microvascular changes and scattered chronic   lacunar infarcts.     < end of copied text >      PHYSICAL EXAM:  GEN: No acute distress  HEENT: SUJIT  LUNGS: Clear to auscultation bilaterally   HEART: S1/S2 present. RRR.   ABD: Soft, non-tender, non-distended. Bowel sounds present  EXT:  R sided BKA noted, new left sided BKA ( dressing c/d/i)  NEURO: AAOX3

## 2019-12-16 NOTE — CHART NOTE - NSCHARTNOTEFT_GEN_A_CORE
Patient: MARGE BELLA  MRN: [id number]  59y Male  Location: [facility] T2-3A 024 B  19 @ 03:34    Vitals:  T(F): 97.9 (12-15-19 @ 21:00), Max: 98.5 (12-15-19 @ 12:00)  HR: 97 (12-15-19 @ 21:00) (90 - 97)  BP: 138/87 (12-15-19 @ 21:00) (112/69 - 144/72)  RR: 18 (12-15-19 @ 21:00) (18 - 18)  SpO2: 99% (12-15-19 @ 20:24) (99% - 100%)    Procedure:   Consent in Chart: [ x ] Yes [  ] No  Diet: Please avoid eating any foods that are high in fat/grease, or foods that make you gassy or cause you to experience GI upset, including spicy foods for 2-3 weeks after your procedure.  Fluids: ascorbic acid 500 milliGRAM(s) Oral daily  dextrose 5% + sodium chloride 0.9%. 1000 milliLiter(s) (50 mL/Hr) IV Continuous <Continuous>  ergocalciferol 50155 Unit(s) Oral every week  ferrous    sulfate 325 milliGRAM(s) Oral two times a day  folic acid 1 milliGRAM(s) Oral daily  sodium bicarbonate 650 milliGRAM(s) Oral three times a day    EK Lead ECG:   Ventricular Rate 80 BPM    Atrial Rate 80 BPM    P-R Interval 170 ms    QRS Duration 88 ms    Q-T Interval 356 ms    QTC Calculation(Bezet) 410 ms    P Axis 10 degrees    R Axis -35 degrees    T Axis 45 degrees    Diagnosis Line Normal sinus rhythm  Left axis deviation Left anterior bess block  Poor R wave progression  Abnormal ECG    Confirmed by KOJO ANGULO MD ([id number]) on 2019 8:37:38 PM (19 @ 17:26)    CXR:  Xray Chest 1 View-PORTABLE IMMEDIATE:   EXAM:  XR CHEST PORTABLE IMMED 1V            PROCEDURE DATE:  2019            INTERPRETATION:  Clinical History / Reason for exam: Shortness of breath    Comparison : Chest radiograph 10/14/2019.    Technique/Positioning: Single frontal chestradiograph.    Findings:    Support devices: None.    Cardiac/mediastinum/hilum: Normal size heart. Uncoiled aorta.    Lung parenchyma/Pleura: No focal pulmonary consolidation, pleural   effusion, or pneumothorax.    Skeleton/soft tissues: Post spinal fixation.    Impression:      No radiographic evidence of acute cardiopulmonary disease.            SIERRA LOPEZ M.D., ATTENDING RADIOLOGIST  This document has been electronically signed. 2019  9:09AM             (19 @ 18:32)    Pre-OP Labs:  CAPILLARY BLOOD GLUCOSE      POCT Blood Glucose.: 113 mg/dL (15 Dec 2019 21:57)  POCT Blood Glucose.: 152 mg/dL (15 Dec 2019 17:15)  POCT Blood Glucose.: 122 mg/dL (15 Dec 2019 11:40)  POCT Blood Glucose.: 217 mg/dL (15 Dec 2019 09:01)  POCT Blood Glucose.: 67 mg/dL (15 Dec 2019 07:54)                          9.2    10.11 )-----------( 297      ( 15 Dec 2019 21:54 )             29.5     12-15    134<L>  |  104  |  33<H>  ----------------------------<  114<H>  5.0   |  17  |  3.4<H>    Ca    7.9<L>      15 Dec 2019 21:54  Phos  4.4     12-15  Mg     1.8     -15    TPro  4.1<L>  /  Alb  1.2<L>  /  TBili  <0.2  /  DBili  x   /  AST  12  /  ALT  6   /  AlkPhos  143<H>  12-15        Type & Screen:  2019    Anticoagulation/Antiplatelet: hsq

## 2019-12-17 NOTE — BRIEF OPERATIVE NOTE - NSICDXBRIEFPROCEDURE_GEN_ALL_CORE_FT
PROCEDURES:  Deep incision of infected bone of foot 02-Dec-2019 13:18:31  Evens Lowe amputation below knee 02-Dec-2019 13:16:04  Evens Lowe
PROCEDURES:  Guillotine amputation below knee 02-Dec-2019 13:16:04  Evens Lowe

## 2019-12-17 NOTE — PROGRESS NOTE ADULT - SUBJECTIVE AND OBJECTIVE BOX
LENGTH OF HOSPITAL STAY: 21d    CHIEF COMPLAINT:   Patient is a 59y old  Male who presents with a chief complaint of Cold left lower extremity (16 Dec 2019 08:23)      Overnight events:    No acute events overnight    ALLERGIES:  sulfamethizole (Unknown)    MEDICATIONS:  STANDING MEDICATIONS  acetaminophen    Suspension .. 650 milliGRAM(s) Oral every 6 hours  amitriptyline 25 milliGRAM(s) Oral at bedtime  ascorbic acid 500 milliGRAM(s) Oral daily  calcium carbonate    500 mG (Tums) Chewable 1 Tablet(s) Chew every 8 hours  cefTRIAXone   IVPB 2000 milliGRAM(s) IV Intermittent every 24 hours  chlorhexidine 4% Liquid 1 Application(s) Topical <User Schedule>  dextrose 5% + sodium chloride 0.9%. 1000 milliLiter(s) IV Continuous <Continuous>  dextrose 50% Injectable 12.5 Gram(s) IV Push once  dextrose 50% Injectable 25 Gram(s) IV Push once  dextrose 50% Injectable 25 Gram(s) IV Push once  ergocalciferol 40146 Unit(s) Oral every week  ferrous    sulfate 325 milliGRAM(s) Oral two times a day  folic acid 1 milliGRAM(s) Oral daily  heparin  Injectable 5000 Unit(s) SubCutaneous every 8 hours  hydrocortisone 5 milliGRAM(s) Oral <User Schedule>  hydrocortisone 10 milliGRAM(s) Oral <User Schedule>  levothyroxine 100 MICROGram(s) Oral daily  liothyronine 25 MICROGram(s) Oral daily  methadone    Tablet 80 milliGRAM(s) Oral every 8 hours  metroNIDAZOLE  IVPB 500 milliGRAM(s) IV Intermittent every 8 hours  mupirocin 2% Ointment 1 Application(s) Topical two times a day  oxybutynin 10 milliGRAM(s) Oral two times a day  sodium bicarbonate 650 milliGRAM(s) Oral three times a day    PRN MEDICATIONS  dextrose 40% Gel 15 Gram(s) Oral once PRN  diazepam    Tablet 5 milliGRAM(s) Oral three times a day PRN  glucagon  Injectable 1 milliGRAM(s) IntraMuscular once PRN  oxyCODONE    IR 50 milliGRAM(s) Oral every 6 hours PRN    VITALS:   T(F): 97.6  HR: 93  BP: 125/80  RR: 19  SpO2: 96%    LABS:                        9.0    14.62 )-----------( 329      ( 17 Dec 2019 07:49 )             29.5     12-17    138  |  109  |  35<H>  ----------------------------<  67<L>  4.6   |  16<L>  |  3.3<H>    Ca    8.0<L>      17 Dec 2019 07:49  Phos  4.4     12-17  Mg     1.6     12-17    TPro  4.1<L>  /  Alb  1.2<L>  /  TBili  <0.2  /  DBili  x   /  AST  12  /  ALT  6   /  AlkPhos  143<H>  12-15    PT/INR - ( 16 Dec 2019 22:27 )   PT: 20.30 sec;   INR: 1.78 ratio         PTT - ( 16 Dec 2019 22:27 )  PTT:40.7 sec                Cultures:      RADIOLOGY:    PHYSICAL EXAM:  GEN: No acute distress  HEENT: SUJIT  LUNGS: Clear to auscultation bilaterally   HEART: S1/S2 present. RRR.   ABD: Soft, non-tender, non-distended. Bowel sounds present  EXT:  R sided BKA noted, new left sided BKA ( dressing c/d/i)  NEURO: AAOX3

## 2019-12-17 NOTE — PROGRESS NOTE ADULT - ATTENDING COMMENTS
pt seen and examined independently:  multiple complaints this morning re: pain medications   Rt BKA, new Lt BKA    59M w/ PVD s/p Rt AKA, paraplegia with multiple chronic bedsores and chronic  suprapubic catheter, HTN, CKD4, hypothyroidism, opioid dependance on methadone, presents w/ lt foot coldness and gangrene.  He is s/p Lt BKA.      #Chronic left foot peripheral artery disease + dry gangrene - s/p Lt BKA, for BKA closure TODAY.  continue rocephin and flagyl per ID recs x 48h after stump closure.      #recurrent severe hypoglycemia - likely due to severe hypothyroidism vs secondary adrenal insufficiency due to chronic opiates (less likely)??.  based on labs, doubt insulinoma, coritsol level not low.  f/u cosyntropin test.  started on hydrocortisone, will change to 10mg in AM, 5mg in PM.  continue on D5NS for now, close monitoring of FS, avoid hypoglycemia.  endocrine f/u    #elevated prolactin - MRI pituitary ok  #severe protein calorie malnutrition/decreased PO intake - continue on marinol, encourage PO  #anemia of chronic disease and CKD - s/p 1U PRBC, hb stable  #folic acid deficiency - continue folic acid  #CKD4 - Cr stable, on PO bicarb  #HTN - bp ok, resume meds  #opioid dependence - continue on methadone and oxycodone,  increase oxycodone to 60mg PO q6h PRN    #chronic sacral bedsores POA - f/u dr. pradhan  #hypothyroidism - w/ TSH 70, on synthroid and liothyronine, repeat TFT in a few weeks  #hypomagnesmia - replete PRN  #vitamin D deficiency - supplement  #PPx - HSQ    Progress Note Handoff  Pending:  for stump closure today  Patient/Family discussion: discussed w/ pt at bedside  Disposition: unclear at this time

## 2019-12-17 NOTE — PROGRESS NOTE ADULT - ASSESSMENT
Mr. Booker is a 60 yo male patient with PMHx of PVD s/p Right AKA months ago, paraplegia with multiple chronic bedsores and suprapubic catheter, HTN (not on any medication), CKD stage IV (not following with nephrology), hypothyroidism, opioid dependance, recent admission due to unresponsiveness, presents for 3-4 days of left foot coldness, pain and worsening ulcers.    # Chronic left foot peripheral artery disease + dry gangrene  - S/p BKA on 12/2/2019  - Closure was canceled on 12/5/2019 due to hypoglycemia, now scheduled for 12/17/2019.  - will keep on maintenance dextrose fluids, please hold off if O2 sat <92 percent  - patient can have surgical closure as long as he is receiving dextrose infusion tx as per attending  - ABx till closure of BKA. 48h post closure could hold all ABX    # Hypoglycemia  - hypoglycemia any-operatively  - Hypoglycemia workup: Insulin low, C pep normal, B-hydroxybutyrate normal,  - ruled out adrenal insuffciency, pitutary adenoma  - Endo consulted - Etiology remarkably malnourished (low albumin, vitamin D, iron) Vs Methadone induced Vs severe hypothyroidism,  - Nutrition conuslt - malnourishment cannot induce hypoglycemia - on merinol now   - on D5 NS 50 cc/hour, pt with low baseline po intake    # Poor PO intake, difficulty swallowing  - dysphagia is not pharyngeal  - GI: Pt would benefits from EGD. After hypoglycemia corrected and after surgery, will do Outpatient  - added marinol, will make PPI BID, colonoscopy as outpatient    # Hypothyroidism  - antibody work up sent: - thyroglobulin and thyroperoxidase negative thus far  - Thyroid ultrasounds: negative  - levothyroxine to 100 mcg daily    # Sacral OM and gangrene  - made Valium PRN due to low bp  - vancomycin on hold for high vanc trough  - Flagyl 500 mg iv q8h  - Rocephin  - ABx till closure of BKA. 48h post closure could hold all     # SILVINA on CKD + HAGMA  - non- oliguric  - patient is refusing hd , no acute   - can increase if Anion gap increases  - h/h noted, no need for venofer sat in the 30 range, no need for LINDY will start once h <9  - f/u Phos, f/u Mag, f/u bmp    # Normocytic anemia  - Iron deficiency and CKD,  multifactorial  - s/p 1 unit pRBC on 11/27  - Patient denies any melena, or hematochezia, noted history of hemorrhoids  - Will trend CBC    # HTN  - not on any medication  - off Amlodipine  Echo results 55-60% , grade 1 pritesh dysfunction    # Opioid dependance  - Patient was on methadone 80 mg QID + Oxycodone 60mg QID  - was oversedated on admission, awake after narcan  - high risk for overdose given CKD  - Patient is malingering to gain access to higher doses of oxycodone, which has side effects and contraindicated in his current condition  - Once his stump will heal, he should f/up at pain management clinic to be tapered off methadone and oxycodone tx.  - Pain management consulted: Methadone 80mg q8, Oxycodone IR 50 q6 PRN, Tylenol 50 q6, Amitriptyline 25mg qHS for now    # Folate acid deficiency  - on folic acid po qd    # Vitamin D def  - 4000 qd daily    # Chronic sacral bedsore.  - Followed by Dr. Gooden

## 2019-12-17 NOTE — BRIEF OPERATIVE NOTE - NSICDXBRIEFPREOP_GEN_ALL_CORE_FT
PRE-OP DIAGNOSIS:  Infected wound 02-Dec-2019 13:18:46  Evens Lowe
PRE-OP DIAGNOSIS:  Infected wound 02-Dec-2019 13:18:46  Evens Lowe

## 2019-12-18 NOTE — CONSULT NOTE ADULT - ASSESSMENT
IMPRESSION: Rehab of L BKA, Old R BKA    PRECAUTIONS: [    ] Cardiac  [    ] Respiratory  [    ] Seizures [    ] Contact Isolation  [    ] Droplet Isolation  [    ] Other    Weight Bearing Status:     RECOMMENDATION: PT NOT NEEDED OT ORDERED   PATIENT REQUESTS HOME WITH SERVICES   AGAINST IN PATIENT OR SUBACUTE REHAB    Out of Bed to Chair     DVT/Decubiti Prophylaxis    REHAB PLAN:     [     ] Bedside P/T 3-5 times a week   [    x ] Bedside O/T  2-3 times a week   [     ] No Rehab Therapy Indicated   [     ]  Speech Therapy   Conditioning/ROM                                 ADL  Bed Mobility                                            Conditioning/ROM  Transfers                                                  Bed Mobility  Sitting /Standing Balance                      Transfers                                        Gait Training                                            Sitting/Standing Balance  Stair Training [   ]Applicable                 Home equipment Eval                                                                     Splinting  [   ] Only      GOALS:   ADL   [    ]   Independent         Transfers  [    ] Independent            Ambulation  [     ] Independent     [     ] With device                            [    ]  CG                                               [    ]  CG                                                    [     ] CG                            [    ] Min A                                          [    ] Min A                                                [     ] Min  A          DISCHARGE PLAN:   [     ]  Good candidate for Intensive Rehabilitation/Hospital based                                             Will tolerate 3hrs Intensive Rehab Daily                                       [      ]  Short Term Rehab in Skilled Nursing Facility                                       [    x  ]  Home with Outpatient or VN services                                         [      ]  Possible Candidate for Intensive Hospital based Rehab

## 2019-12-18 NOTE — CONSULT NOTE ADULT - SUBJECTIVE AND OBJECTIVE BOX
Patient is a 59y old  Male who presents with a chief complaint of Cold left lower extremity (18 Dec 2019 14:59)    HPI:  Mr. Booker is a 60 yo male patient with Pmhx of PVD s/p R AKA, paraplegia with multiple chronic bedsores and suprapubic catheter, HTN (not on any medication), CKD stage IV (not following with nephrology), hypothyroidism, opioid dependance, recent admission due to unresponsiveness, presents for 3-4 days of left foot coldness, pain and worsening ulcers.    Patient went today to see Dr. Gooden who sent patient to ED. He noted that he has had left foot ulcers for the last 5 years, course was waxing and waning and was stable with dressing. Lately he felt his wound is getting worse and his foot felt cold. Denies any fever, chills. Patient denying any cp or sob. No GI or  complaints.    In ED, patient afebrile, tachycardic hypertensive. He was given 2L of LR, and one dose of vancomycin. Patient was seen by vascular surgery and arterial doppler US of the right LE was done. Patient will be admitted to the medical service. (26 Nov 2019 21:35)      PAST MEDICAL & SURGICAL HISTORY:  Anemia  PAD (peripheral artery disease)  Hypertension  Suprapubic catheter  Pressure ulcer  Diabetes  Lumbar compression fracture  S/P below knee amputation, right  S/P debridement  Toe amputation status, right  H/O hernia repair      Hospital Course: SP L BKA on 12/2/2019. Closure on 12/17/2019.	  Sacral OM and gangrene  - made Valium PRN due to low bp  - vancomycin on hold for high vanc trough  - Flagyl 500 mg iv q8h  - Rocephin 2g iv q24  - ABx till closure of BKA. 48h post closure could hold all     TODAY'S SUBJECTIVE & REVIEW OF SYMPTOMS:     Constitutional WNL   Cardio WNL   Resp WNL   GI WNL  Heme WNL  Endo WNL  Skin WNL  MSK Chronic Pain  Neuro WNL  Cognitive WNL  Psych WNL      MEDICATIONS  (STANDING):  amitriptyline 25 milliGRAM(s) Oral at bedtime  cefTRIAXone   IVPB 1000 milliGRAM(s) IV Intermittent every 24 hours  chlorhexidine 4% Liquid 1 Application(s) Topical <User Schedule>  doxycycline hyclate Capsule 100 milliGRAM(s) Oral every 12 hours  heparin  Injectable 5000 Unit(s) SubCutaneous every 8 hours  hydrocortisone 5 milliGRAM(s) Oral <User Schedule>  hydrocortisone 10 milliGRAM(s) Oral <User Schedule>  levothyroxine 100 MICROGram(s) Oral daily  liothyronine 25 MICROGram(s) Oral daily  methadone    Tablet 80 milliGRAM(s) Oral every 8 hours  metroNIDAZOLE  IVPB 500 milliGRAM(s) IV Intermittent every 8 hours  mupirocin 2% Ointment 1 Application(s) Topical two times a day    MEDICATIONS  (PRN):  diazepam    Tablet 5 milliGRAM(s) Oral three times a day PRN anxiety  oxyCODONE    IR 60 milliGRAM(s) Oral every 6 hours PRN Severe Pain (7 - 10)      FAMILY HISTORY:  No pertinent family history in first degree relatives      Allergies    sulfamethizole (Unknown)    Intolerances        SOCIAL HISTORY:    [    ] Etoh  [    ] Smoking  [    ] Substance abuse     Home Environment:  [  x  ] Home Alone  [    ] Lives with Family  [    ] Home Health Aid    Dwelling:  [    ] Apartment  [ x   ] Private House  [    ] Adult Home  [    ] Skilled Nursing Facility      [    ] Short Term  [    ] Long Term  [  x  ] Stairs   RAMP                        [    ] Elevator     FUNCTIONAL STATUS PTA: (Check all that apply)  Ambulation: [    x ]Independent    [    ] Dependent     [    ] Non-Ambulatory  Assistive Device: [    ] SA Cane  [    ]  Q Cane  [    ] Walker  [  x  ]  Wheelchair  ADL : [  x  ] Independent  [    ]  Dependent       Vital Signs Last 24 Hrs  T(C): 36 (18 Dec 2019 12:20), Max: 36.8 (18 Dec 2019 06:21)  T(F): 96.8 (18 Dec 2019 12:20), Max: 98.2 (18 Dec 2019 06:21)  HR: 86 (18 Dec 2019 12:20) (81 - 98)  BP: 139/75 (18 Dec 2019 12:20) (125/79 - 139/75)  BP(mean): 97 (17 Dec 2019 15:20) (97 - 97)  RR: 17 (18 Dec 2019 12:20) (12 - 18)  SpO2: 97% (17 Dec 2019 16:00) (97% - 99%)      PHYSICAL EXAM: Alert & Oriented X3  GENERAL: NAD, well-groomed, well-developed  HEAD:  Atraumatic, Normocephalic  EYES: EOMI, PERRLA, conjunctiva and sclera clear  NECK: Supple  CHEST/LUNG: Clear bilaterally  HEART: Regular rate and rhythm  ABDOMEN: Soft, Nontender, Nondistended; Bowel sounds present  EXTREMITIES:  R BKA Healed, L BKA in Deana +drain    NERVOUS SYSTEM:  Cranial Nerves 2-12 intact [  x  ] Abnormal  [    ]  ROM: WFL all extremities [    ]  Abnormal [  x   ]LLE not tested  Motor Strength: WFL all extremities  [    ]  Abnormal [ x   ]3-4/5 LES  Sensation: intact to light touch [ x   ] Abnormal [    ]      FUNCTIONAL STATUS:  Bed Mobility: [   ]  Independent [    ]  Supervision [   x ]  Needs Assistance [  ]  N/A  Transfers: [    ]  Independent [    ]  Supervision [    ]  Needs Assistance [  x  ]  N/A    Ambulation:  [    ]  Independent [    ]  Supervision [    ]  Needs Assistance [  x  ]  N/A   ADL:  [    ]   Independent [   x ] Requires Assistance [    ] N/A       LABS:                        8.1    9.52  )-----------( 361      ( 18 Dec 2019 07:02 )             26.5     12-18    138  |  108  |  37<H>  ----------------------------<  90  4.9   |  19  |  3.4<H>    Ca    8.0<L>      18 Dec 2019 07:02  Phos  4.9     12-18  Mg     1.6     12-18      PT/INR - ( 16 Dec 2019 22:27 )   PT: 20.30 sec;   INR: 1.78 ratio         PTT - ( 16 Dec 2019 22:27 )  PTT:40.7 sec      RADIOLOGY & ADDITIONAL STUDIES:

## 2019-12-18 NOTE — PROGRESS NOTE ADULT - SUBJECTIVE AND OBJECTIVE BOX
Procedure: Status post left BKA   Post Operative day #1    Patient resting comfortably     COLD FOOT WITH PERIPHERAL VASCULAR DISEASE  ^CIRCULATION IN LEG/PAINFUL  No pertinent family history in first degree relatives  Handoff  MEWS Score  Anemia  PAD (peripheral artery disease)  Hypertension  Suprapubic catheter  Pressure ulcer  Diabetes  Lumbar compression fracture  Infected wound  Infected wound  Cold foot with peripheral vascular disease  Pressure ulcer  Deep incision of infected bone of foot  Guillotine amputation below knee  S/P below knee amputation, right  S/P debridement  Toe amputation status, right  H/O hernia repair  No significant past surgical history  CIRCULATION IN LEG/PAINFUL  36    sulfamethizole (Unknown)    amitriptyline 25 milliGRAM(s) Oral at bedtime  cefTRIAXone   IVPB 1000 milliGRAM(s) IV Intermittent every 24 hours  chlorhexidine 4% Liquid 1 Application(s) Topical <User Schedule>  diazepam    Tablet 5 milliGRAM(s) Oral three times a day PRN  doxycycline hyclate Capsule 100 milliGRAM(s) Oral every 12 hours  heparin  Injectable 5000 Unit(s) SubCutaneous every 8 hours  hydrocortisone 5 milliGRAM(s) Oral <User Schedule>  hydrocortisone 10 milliGRAM(s) Oral <User Schedule>  liothyronine 25 MICROGram(s) Oral daily  methadone    Tablet 80 milliGRAM(s) Oral every 8 hours  metroNIDAZOLE  IVPB 500 milliGRAM(s) IV Intermittent every 8 hours  mupirocin 2% Ointment 1 Application(s) Topical two times a day  oxyCODONE    IR 50 milliGRAM(s) Oral every 6 hours PRN          T(C): 36.1 (19 @ 20:10), Max: 37.1 (19 @ 05:00)  HR: 98 (19 @ 20:10) (81 - 98)  BP: 133/84 (19 @ 20:10) (118/73 - 148/77)  RR: 18 (19 @ 20:10) ()  SpO2: 97% (19 @ 16:00) (96% - 100%)    19 @ 07:01  -  19 @ 07:00  --------------------------------------------------------  IN: 900 mL / OUT: 3400 mL / NET: -2500 mL    19 @ 07:01  -  19 @ 01:56  --------------------------------------------------------  IN: 150 mL / OUT: 285 mL / NET: -135 mL        General:Alert and oriented times 3, not in acute distress   Heart: Regular rate and rhythm, no rubs , murmurs or gallops  Lungs: Clear to auscultation bilaterally, no wheezes, rales, rhonci appreciated  Abdomen: Soft , positive bowel sounds, no tenderness, no distention, no peritoneal signs   Exremites: left bka incision clean dry and intact,  warm extremities,  good color, no swelling, motor and sensation , pulses                 9.0    14.62 )-----------( 329      (  @ 07:49 )             29.5                9.1    9.39  )-----------( 312      (  @ 07:32 )             28.7                    138   |  109   |  35                 Ca: 8.0    BMP:   ----------------------------< 67     M.6   (19 @ 07:49)             4.6    |  16    | 3.3                Ph: 4.4      LFT:     TPro: 4.1 / Alb: 1.2 / TBili: <0.2 / DBili: x / AST: 12 / ALT: 6 / AlkPhos: 143   (12-15-19 @ 21:54)          PT/INR - ( 16 Dec 2019 22:27 )   PT: 20.30 sec;   INR: 1.78 ratio         PTT - ( 16 Dec 2019 22:27 )  PTT:40.7 sec                            Plan and assessment  Pt. 59yMale status post left bka    -Pain management  -follow up reynaldo outpt  - Diet  -medical management  -vascular to change dressing     GARTH Mann   19 @ 01:56  # [id number]/ [id number]

## 2019-12-18 NOTE — CONSULT NOTE ADULT - ASSESSMENT
59 y.o M with PMH of prolonged Abx use ENT called to evaluate for oral candidiasis c/o dysphagia to solids   Hypothyroidism  Vitamin D deficiency        Plan:  FFL tomorrow for evaluation.   F/U with ID recommendations regarding IV Abx and Antifungals  F/U Endocrinology for further management of Thyroid   Case d/w Dr. Hernandez

## 2019-12-18 NOTE — PROGRESS NOTE ADULT - SUBJECTIVE AND OBJECTIVE BOX
LENGTH OF HOSPITAL STAY: 22d    CHIEF COMPLAINT:   Patient is a 59y old  Male who presents with a chief complaint of Cold left lower extremity (18 Dec 2019 01:56)      Overnight events:    No acute events overnight, Complaining pain and keep asking pain meds     ALLERGIES:  sulfamethizole (Unknown)    MEDICATIONS:  STANDING MEDICATIONS  amitriptyline 25 milliGRAM(s) Oral at bedtime  cefTRIAXone   IVPB 1000 milliGRAM(s) IV Intermittent every 24 hours  chlorhexidine 4% Liquid 1 Application(s) Topical <User Schedule>  doxycycline hyclate Capsule 100 milliGRAM(s) Oral every 12 hours  heparin  Injectable 5000 Unit(s) SubCutaneous every 8 hours  hydrocortisone 5 milliGRAM(s) Oral <User Schedule>  hydrocortisone 10 milliGRAM(s) Oral <User Schedule>  levothyroxine 100 MICROGram(s) Oral daily  liothyronine 25 MICROGram(s) Oral daily  methadone    Tablet 80 milliGRAM(s) Oral every 8 hours  metroNIDAZOLE  IVPB 500 milliGRAM(s) IV Intermittent every 8 hours  mupirocin 2% Ointment 1 Application(s) Topical two times a day    PRN MEDICATIONS  diazepam    Tablet 5 milliGRAM(s) Oral three times a day PRN  oxyCODONE    IR 60 milliGRAM(s) Oral every 6 hours PRN    VITALS:   T(F): 96.8  HR: 86  BP: 139/75  RR: 17  SpO2: 97%    LABS:                        8.1    9.52  )-----------( 361      ( 18 Dec 2019 07:02 )             26.5     12-18    138  |  108  |  37<H>  ----------------------------<  90  4.9   |  19  |  3.4<H>    Ca    8.0<L>      18 Dec 2019 07:02  Phos  4.9     12-18  Mg     1.6     12-18      PT/INR - ( 16 Dec 2019 22:27 )   PT: 20.30 sec;   INR: 1.78 ratio         PTT - ( 16 Dec 2019 22:27 )  PTT:40.7 sec                Cultures:      RADIOLOGY:  < from: MR Head w/wo IV Cont (12.13.19 @ 14:34) >  Brain:   1.  No evidence of acute abnormality. Since the prior brain MRI 7/8/2019:  2.  Resolution of the previously seen bilateral occipital subcortical   edema, compatible with resolution of prior PRES.  3.  Interval development of a lacunar infarct within the left aspect of   the splenium, which is now chronic in appearance.  4.  Stable mild chronic microvascular changes and scattered chronic   lacunar infarcts.     Pituitary:  1. Unremarkable appearance of the pituitary gland.    < end of copied text >      PHYSICAL EXAM:  GEN: No acute distress  HEENT: SUJIT  LUNGS: Clear to auscultation bilaterally   HEART: S1/S2 present. RRR.   ABD: Soft, non-tender, non-distended. Bowel sounds present  EXT:  R sided BKA noted, new left sided BKA ( dressing c/d/i)  NEURO: AAOX3

## 2019-12-18 NOTE — CONSULT NOTE ADULT - ATTENDING COMMENTS
Patient examined at bedside. Long history of antibiotics intake. He has been having chronic throat irritation, intermittent voice changes and dysphagia to solids.    Patient will be scoped by ENT team.
see above.  Reviewed with housestaff and attending hospitalist.
Pt seen examined   Agree with above.  59/M with SILVINA on CKD 4 (baseline creat 3.8 mg% Oct 2019) SPC, severe PVD, presented with cold Lt LE and ulcers, found to have severe anemia Hb 6.8 and acidosis.  SILVINA - prerenal vs ATN (ischemic) severe hypotension  - non oliguric  - NS bolus 250x2, obtain CXR to assess volume, increase IVF  cc/hr  - strict Is and OS  - if hypotension persists, pressors, transfer to the Unit  - send UA Urine Na BUN creat  HAG MEt acidosis - cont isotonic bicarb 1/2 NS +75 mEq NA bicarb 100 cc/hr, due to renal failure, check lactate  High phos - cont phoslo, repeat phos, check iPTH  Dysphagia - speech and swallow  Anemia Tsat 30, chek ferritin, blood Tx, will need Procrit later  Pt never wanted HD, monitor closely volume status, stabilize BP  ANGIOGRAM SCHEDULED FOR Thursday FOR left LE GANGREENE - high risk for GOLDIE  will follow  D/W HS
pt denies cp,sob. recent admission sis and op w/u advised. not done. check echo. high risk.

## 2019-12-18 NOTE — PROGRESS NOTE ADULT - ASSESSMENT
Mr. Booker is a 58 yo male patient with PMHx of PVD s/p Right AKA months ago, paraplegia with multiple chronic bedsores and suprapubic catheter, HTN (not on any medication), CKD stage IV (not following with nephrology), hypothyroidism, opioid dependance, recent admission due to unresponsiveness, presents for 3-4 days of left foot coldness, pain and worsening ulcers.    # Chronic left foot peripheral artery disease + dry gangrene  - S/p BKA on 12/2/2019. Closure on 12/17/2019.  - ABx till closure of BKA. 48h post closure could hold all ABX    # Hypoglycemia  - hypoglycemia any-operatively  - Hypoglycemia workup: Insulin low, C pep normal, B-hydroxybutyrate normal,  - ruled out adrenal insuffciency, pitutary adenoma  - Endo consulted - Etiology remarkably malnourished (low albumin, vitamin D, iron) Vs Methadone induced Vs severe hypothyroidism,  - Nutrition conuslt - malnourishment cannot induce hypoglycemia - on merinol now   - Today he maintained PG well without D5, Will hold D5 right now. but restart it tonight at around 10pm     # Poor PO intake, difficulty swallowing  - dysphagia is not pharyngeal  - GI: Pt would benefits from EGD. After hypoglycemia corrected and after surgery, will do Outpatient  - added marinol, will make PPI BID, colonoscopy as outpatient    # Hypothyroidism  - antibody work up sent: - thyroglobulin and thyroperoxidase negative thus far  - Thyroid ultrasounds: negative  - levothyroxine to 100 mcg daily  - Will follow FS in 4 weeks    # Sacral OM and gangrene  - made Valium PRN due to low bp  - vancomycin on hold for high vanc trough  - Flagyl 500 mg iv q8h  - Rocephin 2g iv q24  - ABx till closure of BKA. 48h post closure could hold all     # SILVINA on CKD + HAGMA  - non- oliguric  - patient is refusing hd , no acute   - can increase if Anion gap increases  - h/h noted, no need for venofer sat in the 30 range, no need for LINDY will start once h <9  - f/u Phos, f/u Mag, f/u bmp    # Normocytic anemia  - Iron deficiency and CKD,  multifactorial  - s/p 1 unit pRBC on 11/27  - Patient denies any melena, or hematochezia, noted history of hemorrhoids  - Will trend CBC    # HTN  - not on any medication  - off Amlodipine  Echo results 55-60% , grade 1 pritesh dysfunction    # Opioid dependance  - Patient was on methadone 80 mg QID + Oxycodone 60mg QID  - was oversedated on admission, awake after narcan  - high risk for overdose given CKD  - Patient is malingering to gain access to higher doses of oxycodone, which has side effects and contraindicated in his current condition  - Once his stump will heal, he should f/up at pain management clinic to be tapered off methadone and oxycodone tx.  - Pain management consulted: Methadone 80mg q8, Oxycodone IR 50 q6 PRN, Tylenol 50 q6, Amitriptyline 25mg qHS for now    # Folate acid deficiency  - on folic acid po qd    # Vitamin D def  - 4000 qd daily    # Chronic sacral bedsore.  - Followed by Dr. Gooden

## 2019-12-18 NOTE — PROGRESS NOTE ADULT - ATTENDING COMMENTS
59M w/ PVD s/p Rt AKA, paraplegia with multiple chronic bedsores and chronic  suprapubic catheter, HTN, CKD4, hypothyroidism, opioid dependance on methadone, presents w/ lt foot coldness and gangrene.  He is s/p Lt BKA.      #Chronic left foot peripheral artery disease + dry gangrene - s/p Lt BKA,  BKA closure done on 12/17. continue rocephin and flagyl per ID recs x 48h after stump closure.      #recurrent severe hypoglycemia - likely due to severe hypothyroidism vs secondary adrenal insufficiency due to chronic opiates.  clarify cosyntropin test results from Endocrine.   started on hydrocortisone, 10mg in AM, 5mg in PM.  Since then no more hypoglycemia. Observe off D5NS drip.  If drops again, recall endocrine.   Resume D5NS from 10PM - 8am to avoid overnight hypoglycemia.     #Dysphagia: ENT will do FFL tomorrow to r/o thrush. outpatient GI scope.   #elevated prolactin - MRI pituitary ok  #severe protein calorie malnutrition/decreased PO intake - continue on marinol, encourage PO  #anemia of chronic disease and CKD - s/p 1U PRBC, hb stable  #folic acid deficiency - continue folic acid  #CKD4 - Cr stable, on PO bicarb  #HTN - bp ok, resume meds  #opioid dependence - continue on methadone and oxycodone,  increase oxycodone to 60mg PO q6h PRN    #chronic sacral bedsores POA - f/u dr. pradhan  #hypothyroidism - w/ TSH 70, on synthroid and liothyronine, repeat TFT in a few weeks  #hypomagnesmia - replete PRN  #vitamin D deficiency - supplement  #PPx - HSQ    Progress Note Handoff  Pending:  for stump closure today  Patient/Family discussion: discussed w/ pt at bedside  Disposition: unclear at this time .

## 2019-12-18 NOTE — CONSULT NOTE ADULT - SUBJECTIVE AND OBJECTIVE BOX
HPI:   Patient is a 59y old  Male with PMH of PVD s/p R AKA, paraplegia with multiple chronic bedsores and suprapubic catheter, HTN (not on any medication), CKD stage IV (not following with nephrology), hypothyroidism, opioid dependance, recent admission due to unresponsiveness, presents for 3-4 days of left foot coldness, pain and worsening ulcers Dx with infected wound to LLE s/p guillotine amputation below knee of LLE. Pt. states to long course of Abx. ENT are asked to evaluate the patient for oral candidiasis. Pt. reports to dysphagia to solids, throat irritation and voice changes. Pt. seen and examined at bedside together with Dr. Hernandez.     PAST MEDICAL & SURGICAL HISTORY:  Anemia  PAD (peripheral artery disease)  Hypertension  Suprapubic catheter  Pressure ulcer  Diabetes  Lumbar compression fracture  S/P below knee amputation, right  S/P debridement  Toe amputation status, right  H/O hernia repair    Allergies: sulfamethizole (Unknown)      MEDICATIONS  (STANDING):  amitriptyline 25 milliGRAM(s) Oral at bedtime  cefTRIAXone   IVPB 1000 milliGRAM(s) IV Intermittent every 24 hours  chlorhexidine 4% Liquid 1 Application(s) Topical <User Schedule>  doxycycline hyclate Capsule 100 milliGRAM(s) Oral every 12 hours  ergocalciferol 45781 Unit(s) Oral once  heparin  Injectable 5000 Unit(s) SubCutaneous every 8 hours  hydrocortisone 5 milliGRAM(s) Oral <User Schedule>  hydrocortisone 10 milliGRAM(s) Oral <User Schedule>  levothyroxine 100 MICROGram(s) Oral daily  liothyronine 25 MICROGram(s) Oral daily  methadone    Tablet 80 milliGRAM(s) Oral every 8 hours  metroNIDAZOLE  IVPB 500 milliGRAM(s) IV Intermittent every 8 hours  mupirocin 2% Ointment 1 Application(s) Topical two times a day    MEDICATIONS  (PRN):  diazepam    Tablet 5 milliGRAM(s) Oral three times a day PRN anxiety  oxyCODONE    IR 60 milliGRAM(s) Oral every 6 hours PRN Severe Pain (7 - 10)    Social History: opioid abuse     ROS: as per HPI    Vital Signs Last 24 Hrs  T(C): 36 (18 Dec 2019 12:20), Max: 36.8 (18 Dec 2019 06:21)  T(F): 96.8 (18 Dec 2019 12:20), Max: 98.2 (18 Dec 2019 06:21)  HR: 86 (18 Dec 2019 12:20) (86 - 98)  BP: 139/75 (18 Dec 2019 12:20) (133/79 - 139/75)  RR: 17 (18 Dec 2019 12:20) (16 - 18)                  PHYSICAL EXAM:  Gen: Awake and Alert in NAD  Head: Normocephalic, Atraumatic  Face: no edema/erythema/fluctuance, parotid glands soft without mass  Eyes: PERRL, EOMI   Nose: Nares bilaterally patent, no discharge  Mouth: Mucosa moist, + erythema and mild edema to oropharynx noted, ? oral candidiasis    Neck: Flat, supple, no lymphadenopathy, trachea midline, no masses  Resp: breathing easily, no stridor  Extremities: B/L BKA left LE dressing in place, knee immobilizer intact.                8.1    9.52  )-----------( 361      ( 18 Dec 2019 07:02 )             26.5    12-18    138  |  108  |  37<H>  ----------------------------<  90  4.9   |  19  |  3.4<H>    Ca    8.0<L>      18 Dec 2019 07:02  Phos  4.9     12-18  Mg     1.6     12-18     PT/INR - ( 16 Dec 2019 22:27 )   PT: 20.30 sec;   INR: 1.78 ratio         PTT - ( 16 Dec 2019 22:27 )  PTT:40.7 sec    Thyroid Stimulating Hormone, Serum in AM (12.13.19 @ 04:30)    Thyroid Stimulating Hormone, Serum: 87.60 uIU/mL    Triiodothyronine, Total (T3 Total) (11.29.19 @ 07:32)    Triiodothyronine, Total (T3 Total): 54 ng/dL    Thyroglobulin Antibodies (11.30.19 @ 14:36)    Thyroglobulin Antibodies: <20.0 IU/mL    T4, Serum (11.29.19 @ 07:32)    T4, Serum: 1.3 ug/dL          Radiology:    < from: MR Head w/wo IV Cont (12.13.19 @ 14:34) >    EXAM:  MR BRAIN WAW IC        PROCEDURE DATE:  12/13/2019    INTERPRETATION:  Clinical History / Reason for exam: Pituitary   insufficiency    Technique: MRI of the brain and pituitary with and without contrast.    Multiplanar multi-sequential MRI of the brain and pituitary was performed   before and following the intravenous administration of 8 cc Gadavist (2   cc discarded) on the 1.5 Daisy magnet.    Comparison: Brain MRI 7/8/2019, CT head dated 10/11/2019.    Findings:    Pituitary MRI: The pituitary gland is normal in size and morphology.   There are no discrete foci of differential enhancement.  The pituitary   stalk is midline and appears unremarkable.  The suprasellar cistern   including the optic chiasm is unremarkable. The cavernous sinuses are   unremarkable.    Brain MRI: The ventricles and cortical sulci are normal in size and   configuration.    There are scattered small foci of T2/FLAIR signal hyperintensity within   the cerebral and cerebellar white matter consistent with chronic   microvascular change, stable. Stable few scattered chronic lacunar   infarcts within the white matter.    The previously seen bilateral occipital lobe subcortical signal   abnormalities have resolved.    There is a lacunar infarct within the left aspect of the splenium of the   corpus callosum which is chronic in appearance but new since July.    There is no extra-axial fluid collection, mass lesion or midline shift.   There is no pathologic intracranial enhancement. Thereis no diffusion   abnormality to suggest acute/subacute infarct.  The major intracranial   flow voids at the skull base are maintained.    Trace fluid noted within the mastoid complexes.    IMPRESSION:     Brain:   1.  No evidence of acute abnormality. Since the prior brain MRI 7/8/2019:  2.  Resolution of the previously seen bilateral occipital subcortical   edema, compatible with resolution of prior PRES.  3.  Interval development of a lacunar infarct within the left aspect of   the splenium, which is now chronic in appearance.  4.  Stable mild chronic microvascular changes and scattered chronic   lacunar infarcts.     Pituitary:  1. Unremarkable appearance of the pituitary gland.      IVELISSE CHADWICK M.D., ATTENDING RADIOLOGIST  This document has been electronically signed. Dec 13 2019  4:05PM    < end of copied text >      < from: US Thyroid + Parathyroid (12.01.19 @ 12:54) >  EXAM:  US THYROID PARATHYROID            PROCEDURE DATE:  12/01/2019      INTERPRETATION:  Clinical history: Hypothyroidism.    Technique: Thyroid sonogram.    Comparison: None    Findings:    Echotexture: Heterogeneous.    Thyroid size:    Right lobe: 5.3 x 2.6 x 2.5 cm  Left lobe: 4.6 x 2 x 2.5 cm  Isthmus: 0.6 cm    Vascularity: Normal.    No discrete nodules are seen.    Impression: Heterogeneous thyroid gland without discrete nodule.    < end of copied text >

## 2019-12-19 NOTE — PROGRESS NOTE ADULT - ATTENDING COMMENTS
59M w/ PVD s/p Rt AKA, paraplegia with multiple chronic bedsores and chronic  suprapubic catheter, HTN, CKD4, hypothyroidism, opioid dependance on methadone, presents w/ lt foot coldness and gangrene.  He is s/p Lt BKA.      #Chronic left foot peripheral artery disease + dry gangrene - s/p Lt BKA,  BKA closure done on 12/17. continue rocephin and flagyl per ID recs until 12/20.      #recurrent severe hypoglycemia - likely due to severe hypothyroidism vs secondary adrenal insufficiency due to chronic opiates.  clarify cosyntropin test results from Endocrine.   started on hydrocortisone, 10mg in AM, 5mg in PM.  Since then no more hypoglycemia. Observe off D5NS drip.  If drops again, recall endocrine.   Resume D5NS from 10PM - 8am to avoid overnight hypoglycemia.     #Dysphagia: ENT will do FFL today to r/o thrush. outpatient GI scope.   #elevated prolactin - MRI pituitary ok  #severe protein calorie malnutrition/decreased PO intake - continue on marinol, encourage PO  #anemia of chronic disease and CKD - s/p 1U PRBC, hb stable  #folic acid deficiency - continue folic acid  #CKD4 - Cr stable, on PO bicarb  #HTN - bp ok, resume meds  #opioid dependence - continue on methadone and oxycodone,  increase oxycodone to 60mg PO q6h PRN    #chronic sacral bedsores POA - f/u dr. pradhan  #hypothyroidism - w/ TSH 70, on synthroid and liothyronine, repeat TFT in a few weeks  #hypomagnesmia - replete PRN  #vitamin D deficiency - supplement  #PPx - HSQ    Progress Note Handoff  Pending:  for stump closure today  Patient/Family discussion: discussed w/ pt at bedside  Disposition: unclear at this time .

## 2019-12-19 NOTE — PROGRESS NOTE ADULT - SUBJECTIVE AND OBJECTIVE BOX
Pt. seen and examined at bedside, in NAD, eating breakfast comfortable, requesting to come for exam later today. Afebrile.     Vital Signs Last 24 Hrs  T(C): 35.9 (19 Dec 2019 05:00), Max: 36 (18 Dec 2019 20:10)  T(F): 96.6 (19 Dec 2019 05:00), Max: 96.8 (18 Dec 2019 20:10)  HR: 82 (19 Dec 2019 05:00) (82 - 97)  BP: 157/76 (19 Dec 2019 05:00) (155/81 - 157/76)  RR: 18 (18 Dec 2019 20:10) (18 - 18)  SpO2: 96% (18 Dec 2019 22:16) (96% - 96%)      MEDICATIONS  (STANDING):  amitriptyline 25 milliGRAM(s) Oral at bedtime  cefTRIAXone   IVPB 1000 milliGRAM(s) IV Intermittent every 24 hours  chlorhexidine 4% Liquid 1 Application(s) Topical <User Schedule>  dextrose 10%. 1000 milliLiter(s) (50 mL/Hr) IV Continuous <Continuous>  doxycycline hyclate Capsule 100 milliGRAM(s) Oral every 12 hours  heparin  Injectable 5000 Unit(s) SubCutaneous every 8 hours  hydrocortisone 5 milliGRAM(s) Oral <User Schedule>  hydrocortisone 10 milliGRAM(s) Oral <User Schedule>  levothyroxine 100 MICROGram(s) Oral daily  liothyronine 25 MICROGram(s) Oral daily  methadone    Tablet 80 milliGRAM(s) Oral every 8 hours  metroNIDAZOLE  IVPB 500 milliGRAM(s) IV Intermittent every 8 hours  mupirocin 2% Ointment 1 Application(s) Topical two times a day    MEDICATIONS  (PRN):  diazepam    Tablet 5 milliGRAM(s) Oral three times a day PRN anxiety  oxyCODONE    IR 60 milliGRAM(s) Oral every 6 hours PRN Severe Pain (7 - 10)        PE:  General: Awake and Alert in [facility]  HEENT: NC/AT, EOMI  Mouth: mucosa moist with + erythema, red tongue and pharynx (Pt. reports he drinks) defers rest of physical exam.     LABS:                        8.1    9.52  )-----------( 361      ( 18 Dec 2019 07:02 )             26.5     12-18    138  |  108  |  37<H>  ----------------------------<  90  4.9   |  19  |  3.4<H>    Ca    8.0<L>      18 Dec 2019 07:02  Phos  4.9     12-18  Mg     1.6     12-18

## 2019-12-19 NOTE — PROGRESS NOTE ADULT - ASSESSMENT
59 y.o M with PMH of prolonged Abx use ENT called to evaluate for oral candidiasis c/o dysphagia to solids   Hypothyroidism  Vitamin D deficiency      Plan:  Will f/u with attending for further eval FFL   Cont IV abx and antifungals  as per ID   F/U Endocrinology recommendations

## 2019-12-19 NOTE — PROGRESS NOTE ADULT - ASSESSMENT
ASSESSMENT/PLAN  - chronic PVD with left foot gangrene, s/p BKA on 12/2  - sacral osteo  - new (?) hypothyroidism -  this is a signif contributor to the hypoalbuminemia  - severe vitamin D deficiency  - iron deficiency anemia  - CKD - pt does not follow with nephrology (suspect he doesn't follow consistently with medical care in general)  -dysphagia  continue with current nutrition   check bmp/phos/mg and correct lytes   will f/u with ENT after the FFL ASSESSMENT/PLAN  - chronic PVD with left foot gangrene, s/p BKA on 12/2  - sacral osteo  - new (?) hypothyroidism -  this is a signif contributor to the hypoalbuminemia  - severe vitamin D deficiency  - iron deficiency anemia  - CKD - pt does not follow with nephrology (suspect he doesn't follow consistently with medical care in general)  -dysphagia    continue with current soft diet  why Ensure Clear? It provides little, lilia with pt on solid food. Should d/w it, consider adding Ensure Compact instead.  Unless not tolerated, would do better with Beneprotein than Prosource, considering CKD.  check bmp/phos/mg and correct lytes   will f/u with ENT after the FFL

## 2019-12-19 NOTE — PROGRESS NOTE ADULT - ATTENDING COMMENTS
Flexible laryngoscopy: nasal cavity, nasopharynx, oropharynx, supraglottis, larynx WNL. Bilateral and symmetric vocal fold mobility. No evidence of candidiasis    - if dysphagia persists, recommend SLP evaluation

## 2019-12-19 NOTE — PROGRESS NOTE ADULT - ASSESSMENT
Mr. Booker is a 60 yo male patient with PMHx of PVD s/p Right AKA months ago, paraplegia with multiple chronic bedsores and suprapubic catheter, HTN (not on any medication), CKD stage IV (not following with nephrology), hypothyroidism, opioid dependance, recent admission due to unresponsiveness, presents for 3-4 days of left foot coldness, pain and worsening ulcers.    # Chronic left foot peripheral artery disease + dry gangrene  - S/p BKA on 12/2/2019. Closure on 12/17/2019.  - ABx till closure of BKA. will hold tomorrow    # Hypoglycemia  - hypoglycemia any-operatively  - Hypoglycemia workup: Insulin low, C pep normal, B-hydroxybutyrate normal,  - ruled out adrenal insuffciency, pitutary adenoma  - Endo consulted - Etiology remarkably malnourished (low albumin, vitamin D, iron) Vs Methadone induced Vs severe hypothyroidism,  - Nutrition conuslt - malnourishment cannot induce hypoglycemia - on merinol now   - On D10    # Poor PO intake, difficulty swallowing  - dysphagia is not pharyngeal  - GI: Pt would benefits from EGD. After hypoglycemia corrected and after surgery, will do Outpatient  - added marinol, will make PPI BID, colonoscopy as outpatient    # Hypothyroidism  - antibody work up sent: - thyroglobulin and thyroperoxidase negative thus far  - Thyroid ultrasounds: negative  - levothyroxine to 100 mcg daily  - Will follow FS in 4 weeks    # Sacral OM and gangrene  - Valium PRN due to low bp  - Doxy, Flagyl 500 mg iv q8h, Rocephin 2g iv q24  - Stop post 48 hour of closure    # SILVINA on CKD + HAGMA  - non oliguric  - patient is refusing hd , no acute  - can increase if Anion gap increases  - no need for venofer sat in the 30 range, no need for LINDY will start once h <9  - f/u Phos, f/u Mag, f/u bmp    # Normocytic anemia  - Iron deficiency and CKD,  multifactorial  - s/p 1 unit pRBC on 11/27  - Patient denies any melena, or hematochezia, noted history of hemorrhoids  - Will trend CBC    # HTN  - not on any medication  - off Amlodipine  Echo results 55-60% , grade 1 pritesh dysfunction    # Opioid dependance  - Patient was on methadone 80 mg QID + Oxycodone 60mg QID  - was oversedated on admission, awake after narcan  - high risk for overdose given CKD  - Patient is malingering to gain access to higher doses of oxycodone, which has side effects and contraindicated in his current condition  - Once his stump will heal, he should f/up at pain management clinic to be tapered off methadone and oxycodone tx.  - Pain management consulted: Methadone 80mg q8, Oxycodone IR 60 q6 PRN, Tylenol 50 q6, Amitriptyline 25mg qHS for now    # Folate acid deficiency  - on folic acid po qd    # Vitamin D def  - 2000 qd daily    # Chronic sacral bedsore.  - Followed by Dr. Gooden

## 2019-12-19 NOTE — PROGRESS NOTE ADULT - SUBJECTIVE AND OBJECTIVE BOX
LENGTH OF HOSPITAL STAY: 23d    CHIEF COMPLAINT:   Patient is a 59y old  Male who presents with a chief complaint of Cold left lower extremity (19 Dec 2019 13:42)      Overnight events:    No acute events overnight, asking for more pain medications    ALLERGIES:  sulfamethizole (Unknown)    MEDICATIONS:  STANDING MEDICATIONS  amitriptyline 25 milliGRAM(s) Oral at bedtime  cefTRIAXone   IVPB 1000 milliGRAM(s) IV Intermittent every 24 hours  chlorhexidine 4% Liquid 1 Application(s) Topical <User Schedule>  dextrose 10%. 1000 milliLiter(s) IV Continuous <Continuous>  doxycycline hyclate Capsule 100 milliGRAM(s) Oral every 12 hours  heparin  Injectable 5000 Unit(s) SubCutaneous every 8 hours  hydrocortisone 5 milliGRAM(s) Oral <User Schedule>  hydrocortisone 10 milliGRAM(s) Oral <User Schedule>  levothyroxine 100 MICROGram(s) Oral daily  liothyronine 25 MICROGram(s) Oral daily  methadone    Tablet 80 milliGRAM(s) Oral every 8 hours  metroNIDAZOLE  IVPB 500 milliGRAM(s) IV Intermittent every 8 hours  mupirocin 2% Ointment 1 Application(s) Topical two times a day    PRN MEDICATIONS  diazepam    Tablet 5 milliGRAM(s) Oral three times a day PRN  oxyCODONE    IR 60 milliGRAM(s) Oral every 6 hours PRN    VITALS:   T(F): 96.6  HR: 82  BP: 157/76  RR: 18  SpO2: 96%    LABS:                        8.1    9.52  )-----------( 361      ( 18 Dec 2019 07:02 )             26.5     12-18    138  |  108  |  37<H>  ----------------------------<  90  4.9   |  19  |  3.4<H>    Ca    8.0<L>      18 Dec 2019 07:02  Phos  4.9     12-18  Mg     1.6     12-18                      Cultures:      RADIOLOGY:    PHYSICAL EXAM:  GEN: No acute distress  HEENT: SUJIT  LUNGS: Clear to auscultation bilaterally   HEART: S1/S2 present. RRR.   ABD: Soft, non-tender, non-distended. Bowel sounds present  EXT:  R sided BKA noted, new left sided BKA ( dressing c/d/i)  NEURO: AAOX3

## 2019-12-19 NOTE — PHYSICAL THERAPY INITIAL EVALUATION ADULT - GENERAL OBSERVATIONS, REHAB EVAL
15:50-16:06. Pt encountered semifowler in bed in NAD, +dressing L LE with Knee immobilizer,, Pt declined PT Eval despite max encouragement. Pt stated he just had the surgery and he is trying to deal with the process. Will f/u with PT as appropriate.

## 2019-12-19 NOTE — PROGRESS NOTE ADULT - SUBJECTIVE AND OBJECTIVE BOX
Patient is a 59y old  Male who presents with a chief complaint of Cold left lower extremity (19 Dec 2019 14:06)  pt seen and evaluated resting comfortably  ENT f/u noted    ICU Vital Signs Last 24 Hrs  T(C): 35.9 (19 Dec 2019 05:00), Max: 36 (18 Dec 2019 20:10)  T(F): 96.6 (19 Dec 2019 05:00), Max: 96.8 (18 Dec 2019 20:10)  HR: 82 (19 Dec 2019 05:00) (82 - 97)  BP: 157/76 (19 Dec 2019 05:00) (155/81 - 157/76)  RR: 18 (18 Dec 2019 20:10) (18 - 18)  SpO2: 96% (18 Dec 2019 22:16) (96% - 96%)    Drug Dosing Weight  Height (cm): 185.42 (17 Dec 2019 11:16)  Weight (kg): 63.4 (17 Dec 2019 11:16)  BMI (kg/m2): 18.4 (17 Dec 2019 11:16)  BSA (m2): 1.85 (17 Dec 2019 11:16)    I&O's Detail    18 Dec 2019 07:01  -  19 Dec 2019 07:00  --------------------------------------------------------  IN:    Oral Fluid: 410 mL  Total IN: 410 mL    OUT:    Bulb: 25 mL    Indwelling Catheter - Suprapubic: 3400 mL  Total OUT: 3425 mL    Total NET: -3015 mL      19 Dec 2019 07:01  -  19 Dec 2019 14:50  --------------------------------------------------------  IN:    Oral Fluid: 120 mL  Total IN: 120 mL    OUT:  Total OUT: 0 mL    Total NET: 120 mL     PHYSICAL EXAM:  Constitutional: resting comfortably  Gastrointestinal: soft n/d  MEDICATIONS  (STANDING):  amitriptyline 25 milliGRAM(s) Oral at bedtime  cefTRIAXone   IVPB 1000 milliGRAM(s) IV Intermittent every 24 hours  chlorhexidine 4% Liquid 1 Application(s) Topical <User Schedule>  cholecalciferol 2000 Unit(s) Oral daily  dextrose 10%. 1000 milliLiter(s) (50 mL/Hr) IV Continuous <Continuous>  doxycycline hyclate Capsule 100 milliGRAM(s) Oral every 12 hours  heparin  Injectable 5000 Unit(s) SubCutaneous every 8 hours  hydrocortisone 5 milliGRAM(s) Oral <User Schedule>  hydrocortisone 10 milliGRAM(s) Oral <User Schedule>  levothyroxine 100 MICROGram(s) Oral daily  liothyronine 25 MICROGram(s) Oral daily  methadone    Tablet 80 milliGRAM(s) Oral every 8 hours  metroNIDAZOLE  IVPB 500 milliGRAM(s) IV Intermittent every 8 hours  mupirocin 2% Ointment 1 Application(s) Topical two times a day      Diet, Mechanical Soft:   No Carb Prosource (1pkg = 15gms Protein)     Qty per Day:  2  Supplement Feeding Modality:  Oral  Ensure Clear Cans or Servings Per Day:  1       Frequency:  Two Times a day  Ensure Pudding Cans or Servings Per Day:  1       Frequency:  Two Times a day (12-18-19 @ 08:30)      LABS  12-18    138  |  108  |  37<H>  ----------------------------<  90  4.9   |  19  |  3.4<H>    Ca    8.0<L>      18 Dec 2019 07:02  Phos  4.9     12-18  Mg     1.6     12-18                        8.1    9.52  )-----------( 361      ( 18 Dec 2019 07:02 )             26.5     CAPILLARY BLOOD GLUCOSE  POCT Blood Glucose.: 103 mg/dL (19 Dec 2019 11:37)  POCT Blood Glucose.: 93 mg/dL (19 Dec 2019 07:51) Patient is a 59y old  Male who presents with a chief complaint of Cold left lower extremity (19 Dec 2019 14:06)  pt seen and evaluated resting comfortably  ENT f/u noted    ICU Vital Signs Last 24 Hrs  T(C): 35.9 (19 Dec 2019 05:00), Max: 36 (18 Dec 2019 20:10)  T(F): 96.6 (19 Dec 2019 05:00), Max: 96.8 (18 Dec 2019 20:10)  HR: 82 (19 Dec 2019 05:00) (82 - 97)  BP: 157/76 (19 Dec 2019 05:00) (155/81 - 157/76)  RR: 18 (18 Dec 2019 20:10) (18 - 18)  SpO2: 96% (18 Dec 2019 22:16) (96% - 96%)    Drug Dosing Weight  Height (cm): 185.42 (17 Dec 2019 11:16)  Weight (kg): 63.4 (17 Dec 2019 11:16)  BMI (kg/m2): 18.4 (17 Dec 2019 11:16)  BSA (m2): 1.85 (17 Dec 2019 11:16)    I&O's Detail  18 Dec 2019 07:01  -  19 Dec 2019 07:00  --------------------------------------------------------  IN:    Oral Fluid: 410 mL  Total IN: 410 mL  OUT:    Bulb: 25 mL    Indwelling Catheter - Suprapubic: 3400 mL  Total OUT: 3425 mL  Total NET: -3015 mL    19 Dec 2019 07:01  -  19 Dec 2019 14:50  --------------------------------------------------------  IN:    Oral Fluid: 120 mL  Total IN: 120 mL  OUT:  Total OUT: 0 mL  Total NET: 120 mL     PHYSICAL EXAM:  Constitutional: resting comfortably  Gastrointestinal: soft n/d    MEDICATIONS  (STANDING):  amitriptyline 25 milliGRAM(s) Oral at bedtime  cefTRIAXone   IVPB 1000 milliGRAM(s) IV Intermittent every 24 hours  chlorhexidine 4% Liquid 1 Application(s) Topical <User Schedule>  cholecalciferol 2000 Unit(s) Oral daily  dextrose 10%. 1000 milliLiter(s) (50 mL/Hr) IV Continuous <Continuous>  doxycycline hyclate Capsule 100 milliGRAM(s) Oral every 12 hours  heparin  Injectable 5000 Unit(s) SubCutaneous every 8 hours  hydrocortisone 5 milliGRAM(s) Oral <User Schedule>  hydrocortisone 10 milliGRAM(s) Oral <User Schedule>  levothyroxine 100 MICROGram(s) Oral daily  liothyronine 25 MICROGram(s) Oral daily  methadone    Tablet 80 milliGRAM(s) Oral every 8 hours  metroNIDAZOLE  IVPB 500 milliGRAM(s) IV Intermittent every 8 hours  mupirocin 2% Ointment 1 Application(s) Topical two times a day    Diet, Mechanical Soft:   No Carb Prosource (1pkg = 15gms Protein)     Qty per Day:  2  Supplement Feeding Modality:  Oral  Ensure Clear Cans or Servings Per Day:  1       Frequency:  Two Times a day  Ensure Pudding Cans or Servings Per Day:  1       Frequency:  Two Times a day (12-18-19 @ 08:30)    LABS  12-18    138  |  108  |  37<H>  ----------------------------<  90  4.9   |  19  |  3.4<H>    Ca    8.0<L>      18 Dec 2019 07:02  Phos  4.9     12-18  Mg     1.6     12-18   Comprehensive Metabolic Panel Discharge (12.15.19 @ 21:54)    Albumin, Serum: 1.2 g/dL                   8.1    9.52  )-----------( 361      ( 18 Dec 2019 07:02 )             26.5     CAPILLARY BLOOD GLUCOSE  POCT Blood Glucose.: 103 mg/dL (19 Dec 2019 11:37)  POCT Blood Glucose.: 93 mg/dL (19 Dec 2019 07:51)

## 2019-12-20 NOTE — PHYSICAL THERAPY INITIAL EVALUATION ADULT - SPECIFY REASON(S)
935 am PT's 2nd attempt to assess pt for mobilty; however, pt declined; verbalized he is not ready to start therapy at this time. Pt instructed several therapeutic exercises at bedside to prevent

## 2019-12-20 NOTE — PROGRESS NOTE ADULT - ASSESSMENT
Mr. Booker is a 60 yo male patient with PMHx of PVD s/p Right AKA months ago, paraplegia with multiple chronic bedsores and suprapubic catheter, HTN (not on any medication), CKD stage IV (not following with nephrology), hypothyroidism, opioid dependance, recent admission due to unresponsiveness, presents for 3-4 days of left foot coldness, pain and worsening ulcers.    # Chronic left foot peripheral artery disease + dry gangrene  - S/p BKA on 12/2/2019. Closure on 12/17/2019.  - ABx till closure of BKA. will hold tomorrow    # Hypoglycemia  - hypoglycemia any-operatively  - Hypoglycemia workup: Insulin low, C pep normal, B-hydroxybutyrate normal,  - ruled out adrenal insuffciency, pitutary adenoma  - Endo consulted - Etiology remarkably malnourished (low albumin, vitamin D, iron) Vs Methadone induced Vs severe hypothyroidism,  - Again Endo condulted for interpretation of adrenal cosyntropin tests, Hydrocortisone dose, management of hypothyroidism - need of liothyronine?  - Nutrition conuslt - malnourishment cannot induce hypoglycemia - on merinol now   - On D5 ggt right now    # Poor PO intake, difficulty swallowing  - dysphagia is not pharyngeal  - GI: Pt would benefits from EGD. After hypoglycemia corrected and after surgery, will do Outpatient  - added marinol, will make PPI BID, colonoscopy as outpatient    # Hypothyroidism  - antibody work up sent: - thyroglobulin and thyroperoxidase negative thus far  - Thyroid ultrasounds: negative  - levothyroxine to 100 mcg daily  - Will follow TSH in 4 weeks    # Sacral OM and gangrene  - Valium PRN due to low bp  - Doxy, Flagyl 500 mg iv q8h, Rocephin 2g iv q24  - Stop post 48 hour of closure    # SILVINA on CKD + HAGMA  - non oliguric  - patient is refusing hd , no acute  - can increase if Anion gap increases  - no need for venofer sat in the 30 range, no need for LINDY will start once h <9  - f/u Phos, f/u Mag, f/u bmp    # Normocytic anemia  - Iron deficiency and CKD,  multifactorial  - s/p 1 unit pRBC on 11/27  - Patient denies any melena, or hematochezia, noted history of hemorrhoids  - Will trend CBC    # HTN  - not on any medication  - off Amlodipine  Echo results 55-60% , grade 1 pritesh dysfunction    # Opioid dependance  - Patient was on methadone 80 mg QID + Oxycodone 60mg QID  - was oversedated on admission, awake after narcan  - high risk for overdose given CKD  - Patient is malingering to gain access to higher doses of oxycodone, which has side effects and contraindicated in his current condition  - Once his stump will heal, he should f/up at pain management clinic to be tapered off methadone and oxycodone tx.  - Pain management consulted: Methadone 80mg q8, Oxycodone IR 60 q6 PRN, Tylenol 50 q6, Amitriptyline 25mg qHS for now    # Folate acid deficiency  - on folic acid po qd    # Vitamin D def  - 2000 qd daily    # Chronic sacral bedsore.  - Followed by Dr. Gooden

## 2019-12-20 NOTE — PROGRESS NOTE ADULT - SUBJECTIVE AND OBJECTIVE BOX
LENGTH OF HOSPITAL STAY: 24d    CHIEF COMPLAINT:   Patient is a 59y old  Male who presents with a chief complaint of Cold left lower extremity (19 Dec 2019 14:50)      Overnight events:    No acute events overnight    ALLERGIES:  sulfamethizole (Unknown)    MEDICATIONS:  STANDING MEDICATIONS  amitriptyline 25 milliGRAM(s) Oral at bedtime  cefTRIAXone   IVPB 1000 milliGRAM(s) IV Intermittent every 24 hours  chlorhexidine 4% Liquid 1 Application(s) Topical <User Schedule>  cholecalciferol 2000 Unit(s) Oral daily  dextrose 5% + sodium chloride 0.9%. 1000 milliLiter(s) IV Continuous <Continuous>  doxycycline hyclate Capsule 100 milliGRAM(s) Oral every 12 hours  heparin  Injectable 5000 Unit(s) SubCutaneous every 8 hours  hydrocortisone 5 milliGRAM(s) Oral <User Schedule>  hydrocortisone 10 milliGRAM(s) Oral <User Schedule>  levothyroxine 100 MICROGram(s) Oral daily  liothyronine 25 MICROGram(s) Oral daily  methadone    Tablet 80 milliGRAM(s) Oral every 8 hours  metroNIDAZOLE  IVPB 500 milliGRAM(s) IV Intermittent every 8 hours  mupirocin 2% Ointment 1 Application(s) Topical two times a day    PRN MEDICATIONS  diazepam    Tablet 5 milliGRAM(s) Oral three times a day PRN  oxyCODONE    IR 60 milliGRAM(s) Oral every 6 hours PRN    VITALS:   T(F): 96.5  HR: 95  BP: 129/79  RR: 18  SpO2: --    LABS:                        8.2    9.99  )-----------( 421      ( 20 Dec 2019 07:09 )             27.1     12-20    137  |  106  |  39<H>  ----------------------------<  73  4.7   |  18  |  3.3<H>    Ca    7.7<L>      20 Dec 2019 07:09  Phos  4.6     12-20  Mg     1.6     12-20                      Cultures:      RADIOLOGY:    PHYSICAL EXAM:  GEN: No acute distress  HEENT: SUJIT  LUNGS: Clear to auscultation bilaterally   HEART: S1/S2 present. RRR.   ABD: Soft, non-tender, non-distended. Bowel sounds present  EXT:  R sided BKA noted, new left sided BKA ( dressing c/d/i)  NEURO: AAOX3

## 2019-12-20 NOTE — PROGRESS NOTE ADULT - ATTENDING COMMENTS
patient appears to have low glycogen reserve, presumably diet related, and also he was catabolic with foot gangrene etc. now that he is postop, his catabolic state should improve with adequate nutrition which he appears to be getting. A specific triad needs to be satisfied to count as hypoglycemia: firstly must have symptoms, then a low fingerstick needs to be confirmed with a low glucose drawn in a grey top tube (not BMP). the symptoms should immediately improve with therapy. There are also some technical issues with checking fingersticks, should be done without alcohol on the finger, and without smearing. my suggestion is to stop routine fingersticks, stop.   dextrose infusions. there is no evidence for adrenal insufficiency, so the hydrocortisone can be stopped. hypothyroidism can be followed by his primary care.

## 2019-12-20 NOTE — PROGRESS NOTE ADULT - SUBJECTIVE AND OBJECTIVE BOX
Reason for Endocrinology Consult: hypoglycemia, and hypothyroidism  PAST MEDICAL & SURGICAL HISTORY:  Anemia  PAD (peripheral artery disease)  Hypertension  Suprapubic catheter  Pressure ulcer  Diabetes  Lumbar compression fracture  S/P below knee amputation, right  S/P debridement  Toe amputation status, right  H/O hernia repair    FAMILY HISTORY:  No pertinent family history in first degree relatives      SH:  Smoking  Etoh:  Recreational Drugs:    Home Medications:  diazePAM 10 mg oral tablet: 1 tab(s) orally 2 times a day (26 Nov 2019 21:21)  levothyroxine 50 mcg (0.05 mg) oral tablet: 1 tab(s) orally once a day (26 Nov 2019 21:21)  methadone: 80 milligram(s) orally 4 times a day (26 Nov 2019 21:21)  oxybutynin 10 mg/24 hr oral tablet, extended release: 1 tab(s) orally 2 times a day (26 Nov 2019 21:21)  OxyCONTIN 60 mg oral tablet, extended release: 1 tab(s) orally 4 times a day (26 Nov 2019 21:21)      Current (Non-Endocrine) Meds:  amitriptyline 25 milliGRAM(s) Oral at bedtime  chlorhexidine 4% Liquid 1 Application(s) Topical <User Schedule>  cholecalciferol 2000 Unit(s) Oral daily  diazepam    Tablet 5 milliGRAM(s) Oral three times a day PRN  heparin  Injectable 5000 Unit(s) SubCutaneous every 8 hours  methadone    Tablet 80 milliGRAM(s) Oral every 8 hours  mupirocin 2% Ointment 1 Application(s) Topical two times a day  oxyCODONE    IR 60 milliGRAM(s) Oral every 6 hours PRN      Current Endocrine Meds:   hydrocortisone 5 milliGRAM(s) Oral <User Schedule>  hydrocortisone 10 milliGRAM(s) Oral <User Schedule>  levothyroxine 100 MICROGram(s) Oral daily  liothyronine 25 MICROGram(s) Oral daily      Allergies:  sulfamethizole (Unknown)      ROS:  Denies the following except as indicated.    General: weight loss/weight gain, decreased appetite, fatigue, fever  Eyes: blurry vision, double vision  ENT: neck swelling, dysphagia, voice changes   CV: palpitations, SOB, chest pain, cough  GI: nausea, vomiting, diarrhea, constipation, abdominal pain  : nocturia,  polyuria, dysuria  Endo: decreased libido, heat/cold intolerance, jitteriness  MSK: arthralgias, myalgias  Skin: rash, dryness, diaphoresis  Neuro: pedal numbness,pedal paresthesias, pedal pain    Height (cm): 185.42 (12-17 @ 11:16)  Weight (kg): 63.4 (12-17 @ 11:16)  BMI (kg/m2): 18.4 (12-17 @ 11:16)    Vital Signs Last 24 Hrs  T(C): 35.2 (20 Dec 2019 13:30), Max: 36.4 (19 Dec 2019 21:11)  T(F): 95.4 (20 Dec 2019 13:30), Max: 97.5 (19 Dec 2019 21:11)  HR: 89 (20 Dec 2019 13:30) (89 - 95)  BP: 133/63 (20 Dec 2019 13:30) (129/79 - 143/77)  BP(mean): --  RR: 18 (20 Dec 2019 13:30) (18 - 18)  SpO2: --  Constitutional: WN/WD in NAD.   Neck: no thyromegaly or palpable thyroid nodules   Respiratory: lungs CTAB.  Cardiovascular: regular rate and rhythm, normal S1 and S2, no audible murmurs  GI: soft, NT/ND, no masses/HSM appreciated.  Ext: no edema, no ulcers, pedal pulses palpable bilaterally  Neurology: no tremor, monofilament sensation intact in feet  Psychiatric: A&O x 3, normal affect/mood.        LABS:                        8.2    9.99  )-----------( 421      ( 20 Dec 2019 07:09 )             27.1     12-20    137  |  106  |  39<H>  ----------------------------<  73  4.7   |  18  |  3.3<H>    Ca    7.7<L>      20 Dec 2019 07:09  Phos  4.6     12-20  Mg     1.6     12-20                                 Thyroid Stimulating Hormone, Serum: 87.60 (12-13 @ 04:30)  Thyroid Stimulating Hormone, Serum: 93.10 (12-09 @ 16:57)  Thyroid Stimulating Hormone, Serum: 72.80 (11-29 @ 07:32)  Thyroid Stimulating Hormone, Serum: 70.10 (11-29 @ 07:32)  Thyroid Stimulating Hormone, Serum: 104.00 (11-28 @ 06:29)    Free Thyroxine, Serum: 0.3 ng/dL (11-29-19 @ 07:32)    Triiodothyronine, Total (T3 Total): 54 ng/dL (11-29-19 @ 07:32)      RADIOLOGY & ADDITIONAL STUDIES:    A/P:59yMale    1.  DM  For now:  Glargine-    units at bedtime  Lispro-    units three times a day before meals   Will continue to monitor     At discharge, recommend:      Pt can follow up at discharge with:    Above discussed with resident.

## 2019-12-20 NOTE — PROGRESS NOTE ADULT - ATTENDING COMMENTS
59M w/ PVD s/p Rt AKA, paraplegia with multiple chronic bedsores and chronic  suprapubic catheter, HTN, CKD4, hypothyroidism, opioid dependance on methadone, presents w/ lt foot coldness and gangrene.  He is s/p Lt BKA.      #Chronic left foot peripheral artery disease + dry gangrene - s/p Lt BKA,  BKA closure done on 12/17. continue rocephin and flagyl per ID recs until 12/20.      #recurrent severe hypoglycemia - likely due to severe hypothyroidism vs secondary adrenal insufficiency due to chronic opiates.  clarify cosyntropin test results from Endocrine.   started on hydrocortisone, 10mg in AM, 5mg in PM.  Since then no more hypoglycemia. Observe off D5NS drip.  If drops again, recall endocrine.   Resume D5NS from 10PM - 8am to avoid overnight hypoglycemia.     #Dysphagia: ENT tried FFL patient couldn't tolerate. outpatient GI scope.   #elevated prolactin - MRI pituitary ok  #severe protein calorie malnutrition/decreased PO intake - continue on marinol, encourage PO  #anemia of chronic disease and CKD - s/p 1U PRBC, hb stable  #folic acid deficiency - continue folic acid  #CKD4 - Cr stable, on PO bicarb  #HTN - bp ok, resume meds  #opioid dependence - continue on methadone and oxycodone,  increase oxycodone to 60mg PO q6h PRN    #chronic sacral bedsores POA - f/u dr. pradhan  #hypothyroidism - w/ TSH 70, on synthroid and liothyronine, repeat TFT in a few weeks  #hypomagnesmia - replete PRN  #vitamin D deficiency - supplement  #PPx - HSQ    Progress Note Handoff  Pending:  Endocrine eval.  Patient/Family discussion: discussed w/ pt at bedside  Disposition: unclear at this time .

## 2019-12-21 NOTE — PROGRESS NOTE ADULT - ATTENDING COMMENTS
59M w/ PVD s/p Rt AKA, paraplegia with multiple chronic bedsores and chronic  suprapubic catheter, HTN, CKD4, hypothyroidism, opioid dependance on methadone, presents w/ lt foot coldness and gangrene.  He is s/p Lt BKA.      #Chronic left foot peripheral artery disease + dry gangrene - s/p Lt BKA,  BKA closure done on 12/17. continue rocephin and flagyl per ID recs until 12/20.      #recurrent severe hypoglycemia - likely due to severe hypothyroidism vs secondary adrenal insufficiency due to chronic opiates.   started on hydrocortisone, 10mg in AM, 5mg in PM.  Since then no more hypoglycemia. Observe off D5NS drip now.   Endocrine recs noted.       #Dysphagia: ENT tried FFL patient couldn't tolerate. patient wants inpatient scope. recall GI on monday.   #elevated prolactin - MRI pituitary ok  #severe protein calorie malnutrition/decreased PO intake - continue on marinol, encourage PO  #anemia of chronic disease and CKD - s/p 1U PRBC, hb stable  #folic acid deficiency - continue folic acid  #CKD4 - Cr stable, on PO bicarb  #HTN - bp ok, resume meds  #opioid dependence - continue on methadone and oxycodone,  increase oxycodone to 60mg PO q6h PRN    #chronic sacral bedsores POA - f/u dr. pradhan  #hypothyroidism - w/ TSH 70, on synthroid and liothyronine, repeat TFT in a few weeks  #hypomagnesmia - replete PRN  #vitamin D deficiency - supplement  #PPx - HSQ    Progress Note Handoff  Pending:  Endocrine eval.  Patient/Family discussion: discussed w/ pt at bedside  Disposition: request repeat PT/OT eval preferably with a male therapist per patient.   GI for EGD, then dispo planning.

## 2019-12-21 NOTE — PROGRESS NOTE ADULT - ASSESSMENT
Mr. Booker is a 60 yo male patient with PMHx of PVD s/p Right AKA months ago, paraplegia with multiple chronic bedsores and suprapubic catheter, HTN (not on any medication), CKD stage IV (not following with nephrology), hypothyroidism, opioid dependance, recent admission due to unresponsiveness, presents for 3-4 days of left foot coldness, pain and worsening ulcers.    # Chronic left foot peripheral artery disease + dry gangrene  - S/p BKA on 12/2/2019. Closure on 12/17/2019.    # Hypoglycemia  - hypoglycemia any-operatively  - As per ENDO - Pt is not hypoglycemic as never been symptomatic (even with FS of 23). No need of FS and D5 was recommended  - ruled out adrenal insuffciency and pitutary adenoma by cosyntropin stimulation test and MRI  - DDs are Methadone induced vs adrenal insufficiency  - As per attending Check FS and Pt can follow up any Endocrinologist as an OP  RECOMMENDATIONS: On discharge pt must have sugar pills every time with him and whenever feel dizzi, nauseous or low FS should take these pills and get a follow up with ENDO as OP.    # Poor PO intake, difficulty swallowing  - dysphagia is not pharyngeal  - GI: Pt would benefits from EGD. After hypoglycemia corrected and after surgery, will do Outpatient  - added marinol, will make PPI BID, colonoscopy as outpatient    # Hypothyroidism  - antibody work up sent: - thyroglobulin and thyroperoxidase negative thus far  - Thyroid ultrasounds: negative  - levothyroxine to 100 mcg daily  - Will follow TSH in 4 weeks    # Sacral OM and gangrene  - Wound care    # SILVINA on CKD + HAGMA  - non oliguric  - patient is refusing hd , no acute  - can increase if Anion gap increases  - no need for venofer sat in the 30 range, no need for LINDY will start once h <9  - f/u Phos, f/u Mag, f/u bmp    # Normocytic anemia  - Iron deficiency and CKD,  multifactorial  - s/p 1 unit pRBC on 11/27  - Patient denies any melena, or hematochezia, noted history of hemorrhoids    # HTN  - not on any medication  - off Amlodipine  - Echo results 55-60% , grade 1 pritesh dysfunction    # Opioid dependance  - Patient was on methadone 80 mg QID + Oxycodone 60mg QID  - was oversedated on admission, awake after narcan  - high risk for overdose given CKD  - As per attending patient is malingering to gain access to higher doses of oxycodone, which has side effects and contraindicated in his current condition  - f/up at pain management clinic to be tapered off methadone and oxycodone tx.  - Pain management consulted: Methadone 80mg q8, Oxycodone IR 50 q6 PRN, Tylenol q6 standing, Amitriptyline 25mg qHS for now (but he is taking more than what pain Mx recommended)    # Folate acid deficiency  - on folic acid po qd    # Vitamin D def  - 2000 qd daily    # Chronic sacral bedsore.  - Followed by Dr. Gooden    # Diet: Mechanical sost  # DVT Ppx: hep SC q8  # GI ppx: protonix bid  # Dispo: likely d/c on monday with /u with pain, endo, vascular, burn, nephro, pcp  # Code: FULL

## 2019-12-21 NOTE — PROGRESS NOTE ADULT - SUBJECTIVE AND OBJECTIVE BOX
LENGTH OF HOSPITAL STAY: 25d    CHIEF COMPLAINT:   Patient is a 59y old  Male who presents with a chief complaint of Cold left lower extremity (20 Dec 2019 18:44)      Overnight events:    No acute events overnight    ALLERGIES:  sulfamethizole (Unknown)    MEDICATIONS:  STANDING MEDICATIONS  amitriptyline 25 milliGRAM(s) Oral at bedtime  chlorhexidine 4% Liquid 1 Application(s) Topical <User Schedule>  cholecalciferol 2000 Unit(s) Oral daily  heparin  Injectable 5000 Unit(s) SubCutaneous every 8 hours  hydrocortisone 10 milliGRAM(s) Oral daily  hydrocortisone 5 milliGRAM(s) Oral daily  levothyroxine 100 MICROGram(s) Oral daily  methadone    Tablet 80 milliGRAM(s) Oral every 8 hours  mupirocin 2% Ointment 1 Application(s) Topical two times a day    PRN MEDICATIONS  diazepam    Tablet 5 milliGRAM(s) Oral three times a day PRN  oxyCODONE    IR 60 milliGRAM(s) Oral every 6 hours PRN    VITALS:   T(F): 97.3  HR: 85  BP: 119/67  RR: 18  SpO2: --    LABS:                        8.4    9.65  )-----------( 419      ( 21 Dec 2019 06:43 )             27.4     12-21    139  |  109  |  35<H>  ----------------------------<  66<L>  4.6   |  17  |  3.4<H>    Ca    8.2<L>      21 Dec 2019 06:43  Phos  4.7     12-21  Mg     1.5     12-21                      Cultures:      RADIOLOGY:  < from: MR Head w/wo IV Cont (12.13.19 @ 14:34) >  IMPRESSION:     Brain:   1.  No evidence of acute abnormality. Since the prior brain MRI 7/8/2019:  2.  Resolution of the previously seen bilateral occipital subcortical   edema, compatible with resolution of prior PRES.  3.  Interval development of a lacunar infarct within the left aspect of   the splenium, which is now chronic in appearance.  4.  Stable mild chronic microvascular changes and scattered chronic   lacunar infarcts.     Pituitary:  1. Unremarkable appearance of the pituitary gland.    < end of copied text >    PHYSICAL EXAM:  GEN: No acute distress  HEENT: SUJIT  LUNGS: Clear to auscultation bilaterally   HEART: S1/S2 present. RRR.   ABD: Soft, non-tender, non-distended. Bowel sounds present  EXT:  R sided BKA noted, new left sided BKA ( dressing c/d/i)  NEURO: AAOX3

## 2019-12-22 NOTE — PHYSICAL THERAPY INITIAL EVALUATION ADULT - GENERAL OBSERVATIONS, REHAB EVAL
9:18-9:23. Pt encountered semifowler in bed sleeping in NAD, +dressing L LE. Pt was awoken by PT, declined PT and ther ex's at b/s stating he has too much pain, was given pain med already but it's not helping.  PT encouraged pt to perform ther ex's at b/s to prevent deconditioning, however pt continued to decline. Pt would benefit more from OT 2* to B/L amputation with LLE most recent amputation.  OT already attempted to see pt with pt declining, however pt would  highly benefit from education of transfers using sliding from bed to chair or w/c. D/C pt from PT.

## 2019-12-22 NOTE — PHYSICAL THERAPY INITIAL EVALUATION ADULT - CRITERIA FOR SKILLED THERAPEUTIC INTERVENTIONS
anticipated discharge recommendation/D/C pt from PT, recommend OT for sliding board transfser education as needed.

## 2019-12-22 NOTE — PROGRESS NOTE ADULT - SUBJECTIVE AND OBJECTIVE BOX
S: again refused PT.      All other pertinent ROS negative.      12-21-19 @ 07:01  -  12-22-19 @ 07:00  --------------------------------------------------------  IN: 220 mL / OUT: 1750 mL / NET: -1530 mL    12-22-19 @ 07:01  -  12-22-19 @ 18:19  --------------------------------------------------------  IN: 0 mL / OUT: 500 mL / NET: -500 mL      Vital Signs Last 24 Hrs  T(C): 37.1 (22 Dec 2019 12:27), Max: 37.1 (22 Dec 2019 12:27)  T(F): 98.7 (22 Dec 2019 12:27), Max: 98.7 (22 Dec 2019 12:27)  HR: 101 (22 Dec 2019 12:27) (89 - 101)  BP: 97/68 (22 Dec 2019 12:27) (97/68 - 137/80)  BP(mean): --  RR: 18 (22 Dec 2019 12:27) (18 - 19)  SpO2: --  PHYSICAL EXAM:    Constitutional: NAD, awake and alert, well-developed  HEENT: PERR, EOMI, Normal Hearing, MMM  Neck: Soft and supple, No LAD, No JVD  Respiratory: Breath sounds are clear bilaterally, No wheezing, rales or rhonchi  Cardiovascular: S1 and S2, regular rate and rhythm, no Murmurs, gallops or rubs  Gastrointestinal: Bowel Sounds present, soft, nontender, nondistended, no guarding, no rebound  Extremities: No peripheral edema  Vascular: 2+ peripheral pulses  Neurological: A/O x 3, no focal deficits  Musculoskeletal: 5/5 strength b/l upper and lower extremities  Skin: No rashes    MEDICATIONS:  MEDICATIONS  (STANDING):  amitriptyline 25 milliGRAM(s) Oral at bedtime  chlorhexidine 4% Liquid 1 Application(s) Topical <User Schedule>  cholecalciferol 2000 Unit(s) Oral daily  heparin  Injectable 5000 Unit(s) SubCutaneous every 8 hours  hydrocortisone 10 milliGRAM(s) Oral daily  hydrocortisone 5 milliGRAM(s) Oral daily  levothyroxine 100 MICROGram(s) Oral daily  methadone    Tablet 80 milliGRAM(s) Oral every 8 hours  mupirocin 2% Ointment 1 Application(s) Topical two times a day      LABS: All Labs Reviewed:                        8.3    11.02 )-----------( 442      ( 22 Dec 2019 07:18 )             27.4     12-22    138  |  107  |  36<H>  ----------------------------<  71  4.4   |  20  |  3.3<H>    Ca    7.8<L>      22 Dec 2019 07:18  Phos  4.3     12-22  Mg     1.6     12-22            Blood Culture:     Radiology: reviewed

## 2019-12-23 NOTE — PROGRESS NOTE ADULT - SUBJECTIVE AND OBJECTIVE BOX
Patient is a 59y old  Male who presents with a chief complaint of Cold left lower extremity (23 Dec 2019 15:31)  pt seen and evaluated   on p.o diet still having dysphagia to solid  per ENT pt was unable to tolerate the FFL  GI f/u noted endoscopy in AM  ICU Vital Signs Last 24 Hrs  T(C): 38.3 (23 Dec 2019 14:01), Max: 38.3 (23 Dec 2019 14:01)  T(F): 100.9 (23 Dec 2019 14:01), Max: 100.9 (23 Dec 2019 14:01)  HR: 103 (23 Dec 2019 14:01) (96 - 104)  BP: 107/68 (23 Dec 2019 14:01) (107/68 - 153/80)  RR: 18 (23 Dec 2019 14:01) (18 - 18)      Drug Dosing Weight  Height (cm): 185.42 (17 Dec 2019 11:16)  Weight (kg): 63.4 (17 Dec 2019 11:16)  BMI (kg/m2): 18.4 (17 Dec 2019 11:16)  BSA (m2): 1.85 (17 Dec 2019 11:16)    I&O's Detail    22 Dec 2019 07:01  -  23 Dec 2019 07:00  --------------------------------------------------------  IN:  Total IN: 0 mL    OUT:    Indwelling Catheter - Suprapubic: 900 mL  Total OUT: 900 mL    Total NET: -900 mL      23 Dec 2019 07:01  -  23 Dec 2019 16:03  --------------------------------------------------------  IN:    Oral Fluid: 210 mL  Total IN: 210 mL    OUT:    Indwelling Catheter - Suprapubic: 1000 mL  Total OUT: 1000 mL    Total NET: -790 mL       PHYSICAL EXAM:  Constitutional: A+Ox3 nad  Gastrointestinal: soft n/t  Extremities left BKA  MEDICATIONS  (STANDING):  amitriptyline 25 milliGRAM(s) Oral at bedtime  chlorhexidine 4% Liquid 1 Application(s) Topical <User Schedule>  cholecalciferol 2000 Unit(s) Oral daily  heparin  Injectable 5000 Unit(s) SubCutaneous every 8 hours  hydrocortisone 10 milliGRAM(s) Oral daily  hydrocortisone 5 milliGRAM(s) Oral daily  levothyroxine 100 MICROGram(s) Oral daily  magnesium sulfate  IVPB 2 Gram(s) IV Intermittent once  methadone    Tablet 80 milliGRAM(s) Oral every 8 hours  mupirocin 2% Ointment 1 Application(s) Topical two times a day      Diet, Mechanical Soft:   No Carb Prosource (1pkg = 15gms Protein)     Qty per Day:  2  Supplement Feeding Modality:  Oral  Ensure Clear Cans or Servings Per Day:  1       Frequency:  Two Times a day  Ensure Pudding Cans or Servings Per Day:  1       Frequency:  Two Times a day (12-18-19 @ 08:30)  Diet, NPO after Midnight:      NPO Start Date: 23-Dec-2019,   NPO Start Time: 23:59 (12-23-19 @ 15:30)      LABS  12-23    136  |  106  |  34<H>  ----------------------------<  69<L>  4.4   |  19  |  3.6<H>    Ca    8.1<L>      23 Dec 2019 06:40  Phos  4.2     12-23  Mg     1.6     12-23                        8.7    10.03 )-----------( 460      ( 23 Dec 2019 06:40 )             28.6     CAPILLARY BLOOD GLUCOSE  POCT Blood Glucose.: 71 mg/dL (23 Dec 2019 07:57)  POCT Blood Glucose.: 106 mg/dL (23 Dec 2019 01:57)

## 2019-12-23 NOTE — PROGRESS NOTE ADULT - ASSESSMENT
Assessment and plan    59M w/ PVD s/p Rt AKA, paraplegia with multiple chronic bedsores and chronic  suprapubic catheter, HTN, CKD4, hypothyroidism, opioid dependance on methadone, presents w/ lt foot coldness and gangrene.  He is s/p Lt BKA.      #Chronic left foot peripheral artery disease + dry gangrene - s/p Lt BKA,  BKA closure done on 12/17. continue rocephin and flagyl per ID recs until 12/20.      #recurrent severe hypoglycemia - likely due to severe hypothyroidism vs secondary adrenal insufficiency due to chronic opiates.   started on hydrocortisone, 10mg in AM, 5mg in PM.  Since then no more hypoglycemia. Observe off D5NS drip now.   Endocrine recs noted.       #Dysphagia: ENT tried FFL patient couldn't tolerate. patient wants inpatient scope. recall GI on monday.   #elevated prolactin - MRI pituitary ok  #severe protein calorie malnutrition/decreased PO intake - continue on marinol, encourage PO  #anemia of chronic disease and CKD - s/p 1U PRBC, hb stable  #folic acid deficiency - continue folic acid  #CKD4 - Cr stable, on PO bicarb  #HTN - bp ok, resume meds  #opioid dependence - continue on methadone and oxycodone,  increase oxycodone to 60mg PO q6h PRN    #chronic sacral bedsores POA - f/u dr. pradhan  #hypothyroidism - w/ TSH 70, on synthroid and liothyronine, repeat TFT in a few weeks  #hypomagnesmia - replete PRN  #vitamin D deficiency - supplement  #PPx - HSQ    Progress Note Handoff  Pending:  Endocrine eval.  Patient/Family discussion: discussed w/ pt at bedside  Disposition: request repeat PT/OT eval preferably with a male therapist per patient.   GI consult for EGD, then dispo planning.

## 2019-12-23 NOTE — PROGRESS NOTE ADULT - ASSESSMENT
ASSESSMENT/PLAN  - chronic PVD with left foot gangrene, s/p BKA on 12/2  - sacral osteo  - new (?) hypothyroidism -  this is a signif contributor to the hypoalbuminemia  - severe vitamin D deficiency  - iron deficiency anemia  - CKD - pt does not follow with nephrology (suspect he doesn't follow consistently with medical care in general)  -dysphagia    continue with current soft diet  check bmp/phos/mg and correct lytes   will f/u with GI

## 2019-12-23 NOTE — PROGRESS NOTE ADULT - ASSESSMENT
59M w/ PVD s/p Rt AKA, paraplegia with multiple chronic bedsores and chronic  suprapubic catheter, HTN, CKD4, hypothyroidism, opioid dependance on methadone, presents w/ lt foot coldness and gangrene.  He is s/p Lt BKA.      #Chronic left foot peripheral artery disease + dry gangrene - s/p Lt BKA,  BKA closure done on 12/17. continue rocephin and flagyl per ID recs until 12/20.      #recurrent severe hypoglycemia - likely due to severe hypothyroidism vs secondary adrenal insufficiency due to chronic opiates.   started on hydrocortisone, 10mg in AM, 5mg in PM.  Since then no more hypoglycemia. Observe off D5NS drip now.   Endocrine recs noted.       #Intermittent Dysphagia: ENT tried FFL patient couldn't tolerate. patient wants inpatient scope. GI planning EGD on Tuesday.  PATIENT IS MEDICALLY OPTIMIZED FOR ENDOSCOPY.   Npo after midnight,.   Just for tomorrow , increase the hydrocortisone to IV 25mg in AM. after the procedure, revert to 5mg in PM, 10mg in AM.    #elevated prolactin - MRI pituitary ok  #severe protein calorie malnutrition/decreased PO intake - continue on marinol, encourage PO  #anemia of chronic disease and CKD - s/p 1U PRBC, hb stable  #folic acid deficiency - continue folic acid  #CKD4 - Cr stable, on PO bicarb  #HTN - bp ok, resume meds  #opioid dependence - continue on methadone and oxycodone,  increase oxycodone to 60mg PO q6h PRN    #chronic sacral bedsores POA - f/u dr. pradhan  #hypothyroidism - w/ TSH 70, on synthroid and liothyronine, repeat TFT in a few weeks  #hypomagnesmia - replete PRN  #vitamin D deficiency - supplement  #PPx - HSQ    Progress Note Handoff  Pending:  Endocrine eval.  Patient/Family discussion: discussed w/ pt at bedside  Disposition: request repeat PT/OT eval preferably with a male therapist per patient.   GI for EGD, then dispo planning.

## 2019-12-23 NOTE — PROGRESS NOTE ADULT - SUBJECTIVE AND OBJECTIVE BOX
S: No new evnets/complanints      All other pertinent ROS negative.      12-22-19 @ 07:01  -  12-23-19 @ 07:00  --------------------------------------------------------  IN: 0 mL / OUT: 900 mL / NET: -900 mL    12-23-19 @ 07:01  -  12-23-19 @ 19:23  --------------------------------------------------------  IN: 210 mL / OUT: 1000 mL / NET: -790 mL      Vital Signs Last 24 Hrs  T(C): 38.2 (23 Dec 2019 16:43), Max: 38.3 (23 Dec 2019 14:01)  T(F): 100.8 (23 Dec 2019 16:43), Max: 100.9 (23 Dec 2019 14:01)  HR: 103 (23 Dec 2019 14:01) (96 - 104)  BP: 107/68 (23 Dec 2019 14:01) (107/68 - 153/80)  BP(mean): --  RR: 18 (23 Dec 2019 14:01) (18 - 18)  SpO2: --  PHYSICAL EXAM:  no change from prior    MEDICATIONS:  MEDICATIONS  (STANDING):  acetaminophen   Tablet .. 650 milliGRAM(s) Oral every 6 hours  amitriptyline 25 milliGRAM(s) Oral at bedtime  chlorhexidine 4% Liquid 1 Application(s) Topical <User Schedule>  cholecalciferol 2000 Unit(s) Oral daily  heparin  Injectable 5000 Unit(s) SubCutaneous every 8 hours  hydrocortisone 10 milliGRAM(s) Oral daily  hydrocortisone 5 milliGRAM(s) Oral daily  levothyroxine 100 MICROGram(s) Oral daily  methadone    Tablet 80 milliGRAM(s) Oral every 8 hours  mupirocin 2% Ointment 1 Application(s) Topical two times a day      LABS: All Labs Reviewed:                        8.7    10.03 )-----------( 460      ( 23 Dec 2019 06:40 )             28.6     12-23    136  |  106  |  34<H>  ----------------------------<  69<L>  4.4   |  19  |  3.6<H>    Ca    8.1<L>      23 Dec 2019 06:40  Phos  4.2     12-23  Mg     1.6     12-23            Blood Culture:     Radiology: reviewed

## 2019-12-23 NOTE — PROGRESS NOTE ADULT - SUBJECTIVE AND OBJECTIVE BOX
MARGE BELLA 59y Male  MRN#: [id number]   Hospital Day: 27d    SUBJECTIVE  Patient is a 59y old Male who presents with a chief complaint of Cold left lower extremity (22 Dec 2019 18:19)  Currently admitted to medicine with the primary diagnosis of Cold foot with peripheral vascular disease    INTERVAL HPI AND OVERNIGHT EVENTS:  Patient was examined and seen at bedside. This morning he is resting comfortably in bed and reports no issues or overnight events.    REVIEW OF SYMPTOMS:  CONSTITUTIONAL: No weakness, fevers or chills; No headaches  EYES: No visual changes, eye pain, or discharge  ENT: No vertigo; No ear pain or change in hearing; No sore throat or difficulty swallowing  NECK: No pain or stiffness  RESPIRATORY: No cough, wheezing, or hemoptysis; No shortness of breath  CARDIOVASCULAR: No chest pain or palpitations  GASTROINTESTINAL: No abdominal or epigastric pain; No nausea, vomiting, or hematemesis; No diarrhea or constipation; No melena or hematochezia  GENITOURINARY: No dysuria, frequency or hematuria  MUSCULOSKELETAL: No joint pain, no muscle pain, no weakness  NEUROLOGICAL: No numbness or weakness  SKIN: No itching or rashes    OBJECTIVE  PAST MEDICAL & SURGICAL HISTORY  Anemia  PAD (peripheral artery disease)  Hypertension  Suprapubic catheter  Pressure ulcer  Diabetes  Lumbar compression fracture  S/P below knee amputation, right  S/P debridement  Toe amputation status, right  H/O hernia repair    ALLERGIES:  sulfamethizole (Unknown)    MEDICATIONS:  STANDING MEDICATIONS  amitriptyline 25 milliGRAM(s) Oral at bedtime  chlorhexidine 4% Liquid 1 Application(s) Topical <User Schedule>  cholecalciferol 2000 Unit(s) Oral daily  heparin  Injectable 5000 Unit(s) SubCutaneous every 8 hours  hydrocortisone 10 milliGRAM(s) Oral daily  hydrocortisone 5 milliGRAM(s) Oral daily  levothyroxine 100 MICROGram(s) Oral daily  methadone    Tablet 80 milliGRAM(s) Oral every 8 hours  mupirocin 2% Ointment 1 Application(s) Topical two times a day    PRN MEDICATIONS  diazepam    Tablet 5 milliGRAM(s) Oral three times a day PRN  oxyCODONE    IR 60 milliGRAM(s) Oral every 6 hours PRN      VITAL SIGNS: Last 24 Hours  T(C): 36.2 (23 Dec 2019 05:00), Max: 37.6 (22 Dec 2019 20:23)  T(F): 97.1 (23 Dec 2019 05:00), Max: 99.7 (22 Dec 2019 20:23)  HR: 96 (23 Dec 2019 05:00) (96 - 104)  BP: 153/80 (23 Dec 2019 05:00) (141/68 - 153/80)  BP(mean): --  RR: 18 (22 Dec 2019 20:23) (18 - 18)  SpO2: --    LABS:                        8.7    10.03 )-----------( 460      ( 23 Dec 2019 06:40 )             28.6     12-23    136  |  106  |  34<H>  ----------------------------<  69<L>  4.4   |  19  |  3.6<H>    Ca    8.1<L>      23 Dec 2019 06:40  Phos  4.2     12-23  Mg     1.6     12-23      PHYSICAL EXAM:  Constitutional: NAD, awake and alert, well-developed  HEENT: PERR, EOMI, Normal Hearing, MMM  Neck: Soft and supple, No LAD, No JVD  Respiratory: Breath sounds are clear bilaterally, No wheezing, rales or rhonchi  Cardiovascular: S1 and S2, regular rate and rhythm, no Murmurs, gallops or rubs  Gastrointestinal: Bowel Sounds present, soft, nontender, nondistended, no guarding, no rebound  Extremities: No peripheral edema  Vascular: 2+ peripheral pulses  Neurological: A/O x 3, no focal deficits

## 2019-12-23 NOTE — PROGRESS NOTE ADULT - ASSESSMENT
Mr. Booker is a 60 yo male patient with Pmhx of PVD s/p R AKA, paraplegia with multiple chronic bedsores and suprapubic catheter, HTN (not on any medication), CKD stage IV (not following with nephrology), hypothyroidism, opioid dependance, recent admission due to unresponsiveness, presents for 3-4 days of left foot coldness, pain and worsening ulcers. GI called for dysphagia.    # Intermittent Dysphagia to solid rule out malignancy vs esophageal web/ stricture/ zencker   Hg stable , low grade fever today   Rec:   workup fever ( chest x-ray , urine analysis , wound infection)   if no sepsis   primary time to mention that patient is optimized and cleared for endoscopy  will aim for endoscopy in am   keep NPO after midnight     #anemia workup:   patient needs screening colonoscopy as outpatient Mr. Booker is a 58 yo male patient with Pmhx of PVD s/p R AKA, paraplegia with multiple chronic bedsores and suprapubic catheter, HTN (not on any medication), CKD stage IV (not following with nephrology), hypothyroidism, opioid dependance, recent admission due to unresponsiveness, presents for 3-4 days of left foot coldness, pain and worsening ulcers. GI called for dysphagia.    # Intermittent Dysphagia to solid rule out malignancy vs esophageal web/ stricture/ zencker   Hg stable , low grade fever today   Rec:   workup fever ( chest x-ray , urine analysis , wound infection)   primary time to mention that patient is optimized and cleared for endoscopy  will aim for endoscopy in am if no further fever  keep NPO after midnight     #anemia workup:   patient needs screening colonoscopy as outpatient

## 2019-12-23 NOTE — PROGRESS NOTE ADULT - SUBJECTIVE AND OBJECTIVE BOX
Gastroenterology progress note:     Patient is a 59y old  Male who presents with a chief complaint of Cold left lower extremity (23 Dec 2019 14:26)       Admitted on: 11-26-19    We are following the patient for: dysphagia      Interval History: no acute event overnight , patient still having dysphagia to solids , hypoglycemia resolved , and patient is optimized for procedure per medical team     Patient's medical problems are  stable          PAST MEDICAL & SURGICAL HISTORY:  Anemia  PAD (peripheral artery disease)  Hypertension  Suprapubic catheter  Pressure ulcer  Diabetes  Lumbar compression fracture  S/P below knee amputation, right  S/P debridement  Toe amputation status, right  H/O hernia repair      MEDICATIONS  (STANDING):  amitriptyline 25 milliGRAM(s) Oral at bedtime  chlorhexidine 4% Liquid 1 Application(s) Topical <User Schedule>  cholecalciferol 2000 Unit(s) Oral daily  heparin  Injectable 5000 Unit(s) SubCutaneous every 8 hours  hydrocortisone 10 milliGRAM(s) Oral daily  hydrocortisone 5 milliGRAM(s) Oral daily  levothyroxine 100 MICROGram(s) Oral daily  magnesium sulfate  IVPB 2 Gram(s) IV Intermittent once  methadone    Tablet 80 milliGRAM(s) Oral every 8 hours  mupirocin 2% Ointment 1 Application(s) Topical two times a day    MEDICATIONS  (PRN):  diazepam    Tablet 5 milliGRAM(s) Oral three times a day PRN anxiety  oxyCODONE    IR 60 milliGRAM(s) Oral every 6 hours PRN Severe Pain (7 - 10)      Allergies  sulfamethizole (Unknown)      Review of Systems:   Cardiovascular:  No Chest Pain, No Palpitations  Respiratory:  No Cough, No Dyspnea  Gastrointestinal:  As described in HPI    Physical Examination:  T(C): 38.3 (12-23-19 @ 14:01), Max: 38.3 (12-23-19 @ 14:01)  HR: 103 (12-23-19 @ 14:01) (96 - 104)  BP: 107/68 (12-23-19 @ 14:01) (107/68 - 153/80)  RR: 18 (12-23-19 @ 14:01) (18 - 18)  SpO2: --      12-22-19 @ 07:01  -  12-23-19 @ 07:00  --------------------------------------------------------  IN: 0 mL / OUT: 900 mL / NET: -900 mL    12-23-19 @ 07:01  -  12-23-19 @ 15:32  --------------------------------------------------------  IN: 210 mL / OUT: 1000 mL / NET: -790 mL      Constitutional: No acute distress.  Respiratory:  No signs of respiratory distress. Lung sounds are clear bilaterally.  Cardiovascular:  S1 S2, Regular rate and rhythm.  Abdominal: Abdomen is soft, symmetric, and non-tender without distention.  Bowel sounds are present and normoactive in all four quadrants. No masses, hepatomegaly, or splenomegaly are noted. +suprapubic catheter   Skin: No rashes, No Jaundice.        Data: (reviewed by attending)                        8.7    10.03 )-----------( 460      ( 23 Dec 2019 06:40 )             28.6     Hgb trend:  8.7  12-23-19 @ 06:40  8.3  12-22-19 @ 07:18  8.4  12-21-19 @ 06:43        12-23    136  |  106  |  34<H>  ----------------------------<  69<L>  4.4   |  19  |  3.6<H>    Ca    8.1<L>      23 Dec 2019 06:40  Phos  4.2     12-23  Mg     1.6     12-23      Liver panel trend:  TBili <0.2   /   AST 12   /   ALT 6   /   AlkP 143   /   Tptn 4.1   /   Alb 1.2    /   DBili --      12-15             Radiology: (reviewed by attending) Gastroenterology progress note:     Patient is a 59y old  Male who presents with a chief complaint of Cold left lower extremity (23 Dec 2019 14:26)       Admitted on: 11-26-19    We are following the patient for: dysphagia      Interval History: no acute event overnight , patient still having dysphagia to solids , hypoglycemia resolved , and patient is optimized for procedure per medical team     Patient's medical problems are stable          PAST MEDICAL & SURGICAL HISTORY:  Anemia  PAD (peripheral artery disease)  Hypertension  Suprapubic catheter  Pressure ulcer  Diabetes  Lumbar compression fracture  S/P below knee amputation, right  S/P debridement  Toe amputation status, right  H/O hernia repair      MEDICATIONS  (STANDING):  amitriptyline 25 milliGRAM(s) Oral at bedtime  chlorhexidine 4% Liquid 1 Application(s) Topical <User Schedule>  cholecalciferol 2000 Unit(s) Oral daily  heparin  Injectable 5000 Unit(s) SubCutaneous every 8 hours  hydrocortisone 10 milliGRAM(s) Oral daily  hydrocortisone 5 milliGRAM(s) Oral daily  levothyroxine 100 MICROGram(s) Oral daily  magnesium sulfate  IVPB 2 Gram(s) IV Intermittent once  methadone    Tablet 80 milliGRAM(s) Oral every 8 hours  mupirocin 2% Ointment 1 Application(s) Topical two times a day    MEDICATIONS  (PRN):  diazepam    Tablet 5 milliGRAM(s) Oral three times a day PRN anxiety  oxyCODONE    IR 60 milliGRAM(s) Oral every 6 hours PRN Severe Pain (7 - 10)      Allergies  sulfamethizole (Unknown)      Review of Systems:   Cardiovascular:  No Chest Pain, No Palpitations  Respiratory:  No Cough, No Dyspnea  Gastrointestinal:  As described in HPI    Physical Examination:  T(C): 38.3 (12-23-19 @ 14:01), Max: 38.3 (12-23-19 @ 14:01)  HR: 103 (12-23-19 @ 14:01) (96 - 104)  BP: 107/68 (12-23-19 @ 14:01) (107/68 - 153/80)  RR: 18 (12-23-19 @ 14:01) (18 - 18)  SpO2: --      12-22-19 @ 07:01  -  12-23-19 @ 07:00  --------------------------------------------------------  IN: 0 mL / OUT: 900 mL / NET: -900 mL    12-23-19 @ 07:01  -  12-23-19 @ 15:32  --------------------------------------------------------  IN: 210 mL / OUT: 1000 mL / NET: -790 mL      Constitutional: No acute distress.  Respiratory:  No signs of respiratory distress. Lung sounds are clear bilaterally.  Cardiovascular:  S1 S2, Regular rate and rhythm.  Abdominal: Abdomen is soft, symmetric, and non-tender without distention.  Bowel sounds are present and normoactive in all four quadrants. No masses, hepatomegaly, or splenomegaly are noted. +suprapubic catheter   Skin: No rashes, No Jaundice. bandages on BKA wounds        Data: (reviewed by attending)                        8.7    10.03 )-----------( 460      ( 23 Dec 2019 06:40 )             28.6     Hgb trend:  8.7  12-23-19 @ 06:40  8.3  12-22-19 @ 07:18  8.4  12-21-19 @ 06:43        12-23    136  |  106  |  34<H>  ----------------------------<  69<L>  4.4   |  19  |  3.6<H>    Ca    8.1<L>      23 Dec 2019 06:40  Phos  4.2     12-23  Mg     1.6     12-23      Liver panel trend:  TBili <0.2   /   AST 12   /   ALT 6   /   AlkP 143   /   Tptn 4.1   /   Alb 1.2    /   DBili --      12-15

## 2019-12-23 NOTE — OCCUPATIONAL THERAPY INITIAL EVALUATION ADULT - PLANNED THERAPY INTERVENTIONS, OT EVAL
strengthening/ADL retraining/bed mobility training/IADL retraining/balance training/parent/caregiver training.../transfer training

## 2019-12-23 NOTE — OCCUPATIONAL THERAPY INITIAL EVALUATION ADULT - LEVEL OF INDEPENDENCE: BED TO CHAIR, REHAB EVAL
popover with bedside chair positioned perpendicular to bed. Pt and RN educated re transfer./minimum assist (75% patients effort)

## 2019-12-23 NOTE — OCCUPATIONAL THERAPY INITIAL EVALUATION ADULT - GENERAL OBSERVATIONS, REHAB EVAL
Pt declined despite max encouragement. OT to cont when appropriate
Pt declined despite max encouragement. Pt stated he just had the surgery and he is trying to deal with the process. OT to follow up.
Pt seen bedside for OT
English

## 2019-12-24 NOTE — PROGRESS NOTE ADULT - SUBJECTIVE AND OBJECTIVE BOX
We are following the patient for Dysphagia     GI HPI Today:  Patient spiked fever yesterday night and he is now treated for pneumonia   No abd pain or vomiting   Pt asking for food     PAST MEDICAL & SURGICAL HISTORY  Anemia  PAD (peripheral artery disease)  Hypertension  Suprapubic catheter  Pressure ulcer  Diabetes  Lumbar compression fracture  S/P below knee amputation, right  S/P debridement  Toe amputation status, right  H/O hernia repair      ALLERGIES:  sulfamethizole (Unknown)      MEDICATIONS:  MEDICATIONS  (STANDING):  acetaminophen   Tablet .. 650 milliGRAM(s) Oral every 6 hours  amitriptyline 25 milliGRAM(s) Oral at bedtime  cefepime   IVPB      cefepime   IVPB 1000 milliGRAM(s) IV Intermittent once  chlorhexidine 4% Liquid 1 Application(s) Topical <User Schedule>  cholecalciferol 2000 Unit(s) Oral daily  heparin  Injectable 5000 Unit(s) SubCutaneous every 8 hours  hydrocortisone 5 milliGRAM(s) Oral daily  levothyroxine 100 MICROGram(s) Oral daily  methadone    Tablet 80 milliGRAM(s) Oral every 8 hours  vancomycin  IVPB      vancomycin  IVPB 250 milliGRAM(s) IV Intermittent once    MEDICATIONS  (PRN):  diazepam    Tablet 5 milliGRAM(s) Oral three times a day PRN anxiety  oxyCODONE    IR 60 milliGRAM(s) Oral every 6 hours PRN Severe Pain (7 - 10)      REVIEW OF SYSTEMS  General:  +++  fevers  Eyes:  No reported pain   ENT:  No sore throat   NECK: No stiffness   CV:  No chest pain   Resp:  No shortness of breath,  cough   GI:  See HPI  :  No dysuria  Muscle:  ++ weakness  Neuro:  No tingling  Endocrine:  No polyuria  Heme:  No ecchymosis        VITALS:   T(F): 97.1 (12-24 @ 07:21), Max: 100.9 (12-23 @ 14:01)  HR: 80 (12-24 @ 05:00) (80 - 104)  BP: 136/77 (12-24 @ 07:21) (97/68 - 153/80)  BP(mean): --  RR: 18 (12-24 @ 07:21) (18 - 19)  SpO2: 97% (12-24 @ 07:21) (97% - 97%)        PHYSICAL EXAM:  GENERAL:  Appears in no distress  HEENT:  Conjunctivae Anicteric   CHEST:  Full & symmetric excursion  HEART:  NS1, S2,   ABDOMEN:  Soft, non-tender, non-distended  SKIN: sacral bedsores   NEURO:  Alert         Blood Work :                        7.6    7.77  )-----------( 369      ( 23 Dec 2019 22:20 )             24.4     PT/INR - ( 23 Dec 2019 22:20 )  INR: 1.35          PTT - ( 23 Dec 2019 22:20 )  PTT:29.0   12-23    134<L>  |  105  |  36<H>  ----------------------------<  72  4.9   |  18  |  3.5<H>    Ca    7.9<L>      23 Dec 2019 22:20  Phos  4.3     12-23  Mg     1.9     12-23      CBC -  ( 23 Dec 2019 22:20 )  Hemoglobin : 7.6    CBC -  ( 23 Dec 2019 06:40 )  Hemoglobin : 8.7    CBC -  ( 22 Dec 2019 07:18 )  Hemoglobin : 8.3    CBC -  ( 21 Dec 2019 06:43 )  Hemoglobin : 8.4      LIVER FUNCTIONS - ( 23 Dec 2019 22:20 )  Alb: 1.3 [3.5 - 5.2] / Pro: 4.1 [6.0 - 8.0] / ALK PHOS: 129 [30 - 115] / ALT: 12 [0 - 41] / AST: 19 [0 - 41] / GGT: x         RADIOLOGY:  < from: Xray Chest 1 View-PORTABLE IMMEDIATE (12.23.19 @ 16:50) >  EXAM:  XR CHEST PORTABLE IMMED 1V            PROCEDURE DATE:  12/23/2019            INTERPRETATION:  Clinical History / Reason for exam: Fever    Comparison : Chest radiograph 11/27/2019.    Technique/Positioning: Single frontal view of the chest.    Findings:    Support devices: None.    Cardiac/mediastinum/hilum: Unremarkable.    Lung parenchyma/Pleura: There is hazy opacity in the right lower lung. No pneumothorax.    Skeleton/soft tissues: Grossly stable partially imaged spinal hardware.    Impression:      Hazy opacity in the right lower lung may reflect developing pneumonia in the appropriate clinical setting.    < end of copied text >

## 2019-12-24 NOTE — CONSULT NOTE ADULT - PROVIDER SPECIALTY LIST ADULT
Infectious Disease
Nephrology
Nutrition Support
Podiatry
Vascular Surgery
ENT
Endocrinology
Gastroenterology
Physiatry
Burn
Infectious Disease
Pain Medicine
Cardiology

## 2019-12-24 NOTE — CONSULT NOTE ADULT - CONSULT REQUESTED DATE/TIME
05-Dec-2019 11:15
11-Dec-2019 13:12
18-Dec-2019 15:09
18-Dec-2019 17:18
24-Dec-2019 11:31
26-Nov-2019 19:44
28-Nov-2019 11:00
28-Nov-2019 11:34
28-Nov-2019 13:36
29-Nov-2019 12:42
29-Nov-2019 16:06
30-Nov-2019 07:56
27-Nov-2019 14:46

## 2019-12-24 NOTE — CONSULT NOTE ADULT - CONSULT REASON
SILVINA on CKD 4. Angiogram Friday
Dysphagia
Hypoglycemia
PAD
RLL PNA
SP L BKA
Sacral decub
heel ulcer of left foot
patient is on dual methadone and oxycodone, over sedated
persistent hypoglycemia
pvd
sacral wound
throat discomfort

## 2019-12-24 NOTE — CONSULT NOTE ADULT - SUBJECTIVE AND OBJECTIVE BOX
SHAYNE, KLARISSAWARD  59y, Male  Allergy: sulfamethizole (Unknown)      All historical available data reviewed.    HPI:  Mr. Booker is a 58 yo male patient with Pmhx of PVD s/p R AKA, paraplegia with multiple chronic bedsores and suprapubic catheter, HTN (not on any medication), CKD stage IV (not following with nephrology), hypothyroidism, opioid dependance, recent admission due to unresponsiveness, presents for 3-4 days of left foot coldness, pain and worsening ulcers.    Patient went today to see Dr. Gooden who sent patient to ED. He noted that he has had left foot ulcers for the last 5 years, course was waxing and waning and was stable with dressing. Lately he felt his wound is getting worse and his foot felt cold. Denies any fever, chills. Patient denying any cp or sob. No GI or  complaints.    In ED, patient afebrile, tachycardic hypertensive. He was given 2L of LR, and one dose of vancomycin. Patient was seen by vascular surgery and arterial doppler US of the right LE was done. Patient will be admitted to the medical service. (26 Nov 2019 21:35)    FAMILY HISTORY:  No pertinent family history in first degree relatives    PAST MEDICAL & SURGICAL HISTORY:  Anemia  PAD (peripheral artery disease)  Hypertension  Suprapubic catheter  Pressure ulcer  Diabetes  Lumbar compression fracture  S/P below knee amputation, right  S/P debridement  Toe amputation status, right  H/O hernia repair        VITALS:  T(F): 97.1, Max: 100.9 (12-23-19 @ 14:01)  HR: 80  BP: 136/77  RR: 18Vital Signs Last 24 Hrs  T(C): 36.2 (24 Dec 2019 07:21), Max: 38.3 (23 Dec 2019 14:01)  T(F): 97.1 (24 Dec 2019 07:21), Max: 100.9 (23 Dec 2019 14:01)  HR: 80 (24 Dec 2019 05:00) (80 - 103)  BP: 136/77 (24 Dec 2019 07:21) (107/68 - 136/77)  BP(mean): --  RR: 18 (24 Dec 2019 07:21) (18 - 18)  SpO2: 97% (24 Dec 2019 07:21) (97% - 97%)    TESTS & MEASUREMENTS:                        7.6    7.77  )-----------( 369      ( 23 Dec 2019 22:20 )             24.4     12-23    134<L>  |  105  |  36<H>  ----------------------------<  72  4.9   |  18  |  3.5<H>    Ca    7.9<L>      23 Dec 2019 22:20  Phos  4.3     12-23  Mg     1.9     12-23    TPro  4.1<L>  /  Alb  1.3<L>  /  TBili  <0.2  /  DBili  x   /  AST  19  /  ALT  12  /  AlkPhos  129<H>  12-23    LIVER FUNCTIONS - ( 23 Dec 2019 22:20 )  Alb: 1.3 g/dL / Pro: 4.1 g/dL / ALK PHOS: 129 U/L / ALT: 12 U/L / AST: 19 U/L / GGT: x                   RADIOLOGY & ADDITIONAL TESTS:  Personal review of radiological diagnostics performed  Echo and EKG results noted when applicable.     ANTIBIOTICS:  cefepime   IVPB      vancomycin  IVPB

## 2019-12-24 NOTE — PROGRESS NOTE ADULT - ATTENDING COMMENTS
59M w/ PVD s/p Rt AKA, paraplegia with multiple chronic bedsores and chronic  suprapubic catheter, HTN, CKD4, hypothyroidism, opioid dependance on methadone, presents w/ lt foot coldness and gangrene.  He is s/p Lt BKA.      #Chronic left foot peripheral artery disease + dry gangrene - s/p Lt BKA,  BKA closure done on 12/17. completed antibiotics    #recurrent severe hypoglycemia - likely due to severe hypothyroidism vs secondary adrenal insufficiency due to chronic opiates.   started on hydrocortisone, 10mg in AM, 5mg in PM.  Since then no more hypoglycemia. Observe off D5NS drip now.   Endocrine recs noted.     #Intermittent Dysphagia: ENT tried FFL patient couldn't tolerate. GI is planning an EGD but patient had fevers 12/24.    #Fevers 12/24: CXR shows increased markings at right base. We were thinking this could be PNA and gave 1 dose Vanc, cefepime but ID wants to watch him off abx.     #elevated prolactin - MRI pituitary ok  #severe protein calorie malnutrition/decreased PO intake - continue on marinol, encourage PO  #anemia of chronic disease and CKD - s/p 1U PRBC, hb stable  #folic acid deficiency - continue folic acid  #CKD4 - Cr stable, on PO bicarb  #HTN - bp ok, resume meds  #opioid dependence - continue on methadone and oxycodone per pain mngmt.  #chronic sacral bedsores POA - f/u dr. pradhan  #hypothyroidism - w/ TSH 70, on synthroid, repeat TFT in a few weeks. Endocrine d/ricco liothyronine.  #hypomagnesmia - replete PRN  #vitamin D deficiency - supplement  #PPx - HSQ    Progress Note Handoff  Pending:  Endocrine eval.  Patient/Family discussion: discussed w/ pt at bedside  Disposition: request repeat PT/OT eval preferably with a male therapist per patient. Ot eval done.  GI for EGD, then dispo planning.

## 2019-12-24 NOTE — CONSULT NOTE ADULT - REASON FOR ADMISSION
Cold left lower extremity

## 2019-12-24 NOTE — CONSULT NOTE ADULT - ASSESSMENT
IMPRESSION:  No active PNA  Clinically asymptomatic  CXR : increased markings right base  WBC 7.7    RECOMMENDATIONS:  d/c ABc

## 2019-12-24 NOTE — PROGRESS NOTE ADULT - ASSESSMENT
Mr. Booker is a 60 yo male patient with Pmhx of PVD s/p R AKA, paraplegia with multiple chronic bedsores and suprapubic catheter, HTN (not on any medication), CKD stage IV (not following with nephrology), hypothyroidism, opioid dependance, recent admission due to unresponsiveness, presents for 3-4 days of left foot coldness, pain and worsening ulcers. GI called for dysphagia.    # Intermittent Dysphagia to solid rule out malignancy vs esophageal web/ stricture/ zencker   Hg stable , low grade fever yesterday night and pt found to have opacity on cxr and being treated for pneumonia   Rec:   Will postpone endoscopy until  patient becomes medically stable and afebrile   Please recall GI when pt is procedure ready     #anemia workup:   patient needs screening colonoscopy as outpatient

## 2019-12-24 NOTE — PROGRESS NOTE ADULT - SUBJECTIVE AND OBJECTIVE BOX
LENGTH OF HOSPITAL STAY: 28d    CHIEF COMPLAINT:   Patient is a 59y old  Male who presents with a chief complaint of Cold left lower extremity (23 Dec 2019 19:22)      Overnight events:    No acute events overnight    ALLERGIES:  sulfamethizole (Unknown)    MEDICATIONS:  STANDING MEDICATIONS  acetaminophen   Tablet .. 650 milliGRAM(s) Oral every 6 hours  amitriptyline 25 milliGRAM(s) Oral at bedtime  cefepime   IVPB      chlorhexidine 4% Liquid 1 Application(s) Topical <User Schedule>  cholecalciferol 2000 Unit(s) Oral daily  heparin  Injectable 5000 Unit(s) SubCutaneous every 8 hours  hydrocortisone 5 milliGRAM(s) Oral daily  levothyroxine 100 MICROGram(s) Oral daily  methadone    Tablet 80 milliGRAM(s) Oral every 8 hours  mupirocin 2% Ointment 1 Application(s) Topical two times a day  vancomycin  IVPB        PRN MEDICATIONS  diazepam    Tablet 5 milliGRAM(s) Oral three times a day PRN  oxyCODONE    IR 60 milliGRAM(s) Oral every 6 hours PRN    VITALS:   T(F): 97.1  HR: 80  BP: 136/77  RR: 18  SpO2: 97%    LABS:                        7.6    7.77  )-----------( 369      ( 23 Dec 2019 22:20 )             24.4     12-23    134<L>  |  105  |  36<H>  ----------------------------<  72  4.9   |  18  |  3.5<H>    Ca    7.9<L>      23 Dec 2019 22:20  Phos  4.3     12-23  Mg     1.9     12-23    TPro  4.1<L>  /  Alb  1.3<L>  /  TBili  <0.2  /  DBili  x   /  AST  19  /  ALT  12  /  AlkPhos  129<H>  12-23    PT/INR - ( 23 Dec 2019 22:20 )   PT: 15.50 sec;   INR: 1.35 ratio         PTT - ( 23 Dec 2019 22:20 )  PTT:29.0 sec                Cultures:      RADIOLOGY:    PHYSICAL EXAM:  GEN: No acute distress  HEENT:   LUNGS: Clear to auscultation bilaterally   HEART: S1/S2 present. RRR.   ABD: Soft, non-tender, non-distended. Bowel sounds present  EXT:  NEURO: AAOX3

## 2019-12-24 NOTE — PROGRESS NOTE ADULT - ASSESSMENT
Mr. Booker is a 60 yo male patient with PMHx of PVD s/p Right AKA months ago, paraplegia with multiple chronic bedsores and suprapubic catheter, HTN (not on any medication), CKD stage IV (not following with nephrology), hypothyroidism, opioid dependance, recent admission due to unresponsiveness, presents for 3-4 days of left foot coldness, pain and worsening ulcers.    # Chronic left foot peripheral artery disease + dry gangrene  - S/p BKA on 12/2/2019. Closure on 12/17/2019.    # Hypoglycemia  - hypoglycemia any-operatively  - As per ENDO - Pt is not hypoglycemic as never been symptomatic (even with FS of 23). No need of FS and D5 was recommended  - ruled out adrenal insuffciency and pitutary adenoma by cosyntropin stimulation test and MRI  - DDs are Methadone induced vs adrenal insufficiency  - As per attending Check FS and Pt can follow up any Endocrinologist as an OP  RECOMMENDATIONS: On discharge pt must have sugar pills every time with him and whenever feel dizzy, nauseous or low FS should take these pills and get a follow up with ENDO as OP.    # Pneumonia - CXR new R base opacity   - cough - sputum landy f/u  - Was on antibiotics untill 48 hours of closure of BKA  - started cefepime and vanc  q 24 - f/u vanc levels  - f/u B and Urine Cx    # Poor PO intake, difficulty swallowing  - dysphagia is not pharyngeal  - GI: Pt would benefits from EGD. After hypoglycemia corrected and after surgery, will do Outpatient  - GI EGD today or later after pneumonia resolved.    # Hypothyroidism  - antibody work up sent: - thyroglobulin and thyroperoxidase negative thus far  - Thyroid ultrasounds: negative  - levothyroxine to 100 mcg daily  - Will follow TSH in 4 weeks    # Sacral OM and gangrene  - Wound care    # SILVINA on CKD + HAGMA  - non oliguric  - patient is refusing hd , no acute  - can increase if Anion gap increases  - no need for venofer sat in the 30 range, no need for LINDY will start once h <9  - f/u Phos, f/u Mag, f/u bmp    # Normocytic anemia  - Iron deficiency and CKD,  multifactorial  - s/p 1 unit pRBC on 11/27  - Patient denies any melena, or hematochezia, noted history of hemorrhoids    # HTN  - not on any medication  - off Amlodipine  - Echo results 55-60% , grade 1 pritesh dysfunction    # Opioid dependance  - Patient was on methadone 80 mg QID + Oxycodone 60mg QID  - was oversedated on admission, awake after narcan  - high risk for overdose given CKD  - As per attending patient is malingering to gain access to higher doses of oxycodone, which has side effects and contraindicated in his current condition  - f/up at pain management clinic to be tapered off methadone and oxycodone tx.  - Pain management consulted: Methadone 80mg q8, Oxycodone IR 50 q6 PRN, Tylenol q6 standing, Amitriptyline 25mg qHS for now (but he is taking more than what pain Mx recommended)    # Folate acid deficiency  - on folic acid po qd    # Vitamin D def  - 2000 qd daily    # Chronic sacral bedsore.  - Followed by Dr. Gooden    # Diet: Mechanical sost  # DVT Ppx: hep SC q8  # GI ppx: protonix bid  # Dispo: likely d/c on monday with /u with pain, endo, vascular, burn, nephro, pcp  # Code: FULL

## 2019-12-25 NOTE — PROGRESS NOTE ADULT - ASSESSMENT
Mr. Booker is a 60 yo male patient with PMHx of PVD s/p Right AKA months ago, paraplegia with multiple chronic bedsores and suprapubic catheter, HTN (not on any medication), CKD stage IV (not following with nephrology), hypothyroidism, opioid dependance, recent admission due to unresponsiveness, presents for 3-4 days of left foot coldness, pain and worsening ulcers.    # Chronic left foot peripheral artery disease + dry gangrene  - S/p BKA on 12/2/2019. Closure on 12/17/2019.  - Closure was initially delayed because of recurrent hypoglycemic episodes and extensive endocrinal workup.    # Hypoglycemia  - hypoglycemia any-operatively  - Unremarkable Pitutary protocol MRI of brain and hypoglycemic workup except some adrenal cortisol level changes (For which ENDO was consulted)  - As per Dr. Murguia malnutrition is the cause of hypoglycemia.  - Consulted Dr. Colbert (nutrition support) - Acc to her malnutrition cannot cause hypoglycemia.   - As per Endocrinologist Dr. Talbot - Pt has never been hypoglycemic as never has symptoms (even with FS of 23). Therefor no need of FS and D5 was recommended   - Our DDs are Methadone induced vs adrenal insufficiency  - As per attending Check FS daily, added Hydrocortisone 10mg in am and 5 mg in Pm and Pt can follow up any Endocrinologist as an OP  RECOMMENDATIONS: On discharge pt must have sugar pills every time with him and whenever feel dizzy, nauseous or low FS should take these pills and get a follow up with ENDO as OP.    # Poor PO intake, difficulty swallowing  - dysphagia is not pharyngeal  - GI: Pt would benefits from EGD. After hypoglycemia corrected and after surgery, will do Outpatient  - GI EGD in next few days    # Hypothyroidism  - antibody work up sent: - thyroglobulin and thyroperoxidase negative thus far  - Thyroid ultrasounds: negative  - levothyroxine to 100 mcg daily  - Will follow TSH in 4 weeks    # Sacral OM and gangrene  - Wound care    # SILVINA on CKD + HAGMA  - non oliguric  - patient is refusing hd , no acute  - can increase if Anion gap increases  - no need for venofer sat in the 30 range, no need for LINDY will start once h <9  - f/u Phos, f/u Mag, f/u bmp    # Normocytic anemia  - Iron deficiency and CKD,  multifactorial  - s/p 1 unit pRBC on 11/27  - Patient denies any melena, or hematochezia, noted history of hemorrhoids    # HTN  - not on any medication  - off Amlodipine  - Echo results 55-60% , grade 1 pritesh dysfunction    # Opioid dependance  - Patient was on methadone 80 mg QID + Oxycodone 60mg QID  - was oversedated on admission, awake after narcan  - high risk for overdose given CKD  - As per attending patient is malingering to gain access to higher doses of oxycodone, which has side effects and contraindicated in his current condition  - f/up at pain management clinic to be tapered off methadone and oxycodone tx.  - Pain management consulted: Methadone 80mg q8, Oxycodone IR 50 q6 PRN, Tylenol q6 standing, Amitriptyline 25mg qHS for now (but he is taking more than what pain Mx recommended)    # Folate acid deficiency  - on folic acid po qd    # Vitamin D def  - 2000 qd daily    # Chronic sacral bedsore.  - Followed by Dr. Gooden    # Diet: Mechanical sost  # DVT Ppx: hep SC q8  # GI ppx: protonix bid  # Dispo: likely d/c on monday with /u with pain, endo, vascular, burn, nephro, pcp  # Code: FULL

## 2019-12-25 NOTE — PROGRESS NOTE ADULT - ATTENDING COMMENTS
59M w/ PVD s/p Rt AKA, paraplegia with multiple chronic bedsores and chronic  suprapubic catheter, HTN, CKD4, hypothyroidism, opioid dependance on methadone, presents w/ lt foot coldness and gangrene.  He is s/p Lt BKA.      #Chronic left foot peripheral artery disease + dry gangrene - s/p Lt BKA,  BKA closure done on 12/17. completed antibiotics    #recurrent severe hypoglycemia - likely due to severe hypothyroidism vs secondary adrenal insufficiency due to chronic opiates.   started on hydrocortisone, 10mg in AM, 5mg in PM.  Since then no more hypoglycemia. Observe off D5NS drip now.   Endocrine recs noted.     #Intermittent Dysphagia: ENT tried FFL patient couldn't tolerate. GI is planning an EGD but patient had fevers 12/24.    #Fevers 12/24: CXR shows increased markings at right base. We were thinking this could be PNA and gave 1 dose Vanc, cefepime but ID wants to watch him off abx.     #elevated prolactin - MRI pituitary ok  #severe protein calorie malnutrition/decreased PO intake - continue on marinol, patient has good appetite.  #anemia of chronic disease and CKD - s/p 1U PRBC, hb stable  #folic acid deficiency - continue folic acid  #CKD4 - Cr stable, on PO bicarb  #HTN - bp well controlled on current regimen  #opioid dependence - continue on methadone and oxycodone per pain mngmt.  #chronic sacral bedsores POA - f/u dr. pradhan  #hypothyroidism - w/ TSH above 80, on synthroid, patient is on Synthroid 100 MCG po once daily- f/up TSH level tomorrow to check if synthroid dose is adequate.  #hypomagnesmia - replete PRN  #vitamin D deficiency - supplement  #PPx - HSQ    Progress Note Handoff  Pending:  GI for EGD than d/c  Patient/Family discussion: discussed w/ pt at bedside  Disposition: d/c home with home care vs. SNF

## 2019-12-26 NOTE — PROGRESS NOTE ADULT - SUBJECTIVE AND OBJECTIVE BOX
LENGTH OF HOSPITAL STAY: 30d    CHIEF COMPLAINT:   Patient is a 59y old  Male who presents with a chief complaint of Cold left lower extremity (25 Dec 2019 08:47)      Overnight events:    No acute events overnight    ALLERGIES:  sulfamethizole (Unknown)    MEDICATIONS:  STANDING MEDICATIONS  acetaminophen   Tablet .. 650 milliGRAM(s) Oral every 6 hours  amitriptyline 25 milliGRAM(s) Oral at bedtime  chlorhexidine 4% Liquid 1 Application(s) Topical <User Schedule>  cholecalciferol 2000 Unit(s) Oral daily  heparin  Injectable 5000 Unit(s) SubCutaneous every 8 hours  hydrocortisone 10 milliGRAM(s) Oral daily  hydrocortisone 5 milliGRAM(s) Oral daily  levothyroxine 100 MICROGram(s) Oral daily  methadone    Tablet 80 milliGRAM(s) Oral every 8 hours    PRN MEDICATIONS  diazepam    Tablet 5 milliGRAM(s) Oral three times a day PRN  oxyCODONE    IR 50 milliGRAM(s) Oral every 6 hours PRN    VITALS:   T(F): 96.2  HR: 93  BP: 139/90  RR: 18  SpO2: --    LABS:                        7.5    10.15 )-----------( 356      ( 26 Dec 2019 07:30 )             24.4     12-26    138  |  107  |  43<H>  ----------------------------<  85  4.4   |  18  |  3.4<H>    Ca    7.8<L>      26 Dec 2019 07:30  Phos  5.2     12-26  Mg     1.8     12-26                Culture - Blood (collected 23 Dec 2019 22:20)  Source: .Blood None  Preliminary Report (25 Dec 2019 04:02):    No growth to date.    Culture - Urine (collected 23 Dec 2019 16:13)  Source: .Urine Clean Catch (Midstream)  Final Report (25 Dec 2019 07:13):    >=3 organisms. Probable collection contamination.            Cultures:    Culture - Blood (collected 23 Dec 2019 22:20)  Source: .Blood None  Preliminary Report (25 Dec 2019 04:02):    No growth to date.    Culture - Urine (collected 23 Dec 2019 16:13)  Source: .Urine Clean Catch (Midstream)  Final Report (25 Dec 2019 07:13):    >=3 organisms. Probable collection contamination.        RADIOLOGY:    PHYSICAL EXAM:  GEN: No acute distress  HEENT: SUJIT  LUNGS: Clear to auscultation bilaterally   HEART: S1/S2 present. RRR.   ABD: Soft, non-tender, non-distended. Bowel sounds present  EXT:  R sided BKA noted, new left sided BKA ( dressing c/d/i)  NEURO: AAOX3

## 2019-12-26 NOTE — CHART NOTE - NSCHARTNOTEFT_GEN_A_CORE
Called by DAVIDSON Hector this afternoon, Pt is hiding Oxycodone pills in his blanket and some guests visit him every day. This arises a concern that he is miss using these pain medications.  I called attending to make her aware of the situation and RN called security to investigate his bed. GI doctors already being called to do EGD as soon as possible to expidate the discharge process.

## 2019-12-26 NOTE — PROGRESS NOTE ADULT - ASSESSMENT
Mr. Booker is a 60 yo male patient with PMHx of PVD s/p Right AKA months ago, paraplegia with multiple chronic bedsores and suprapubic catheter, HTN (not on any medication), CKD stage IV (not following with nephrology), hypothyroidism, opioid dependance, recent admission due to unresponsiveness, presents for 3-4 days of left foot coldness, pain and worsening ulcers.    # Chronic left foot peripheral artery disease + dry gangrene  - S/p BKA on 12/2/2019. Closure on 12/17/2019.  - Closure was initially delayed because of recurrent hypoglycemic episodes and extensive endocrinal workup.    # Hypoglycemia  - hypoglycemia any-operatively  - Unremarkable Pitutary protocol MRI of brain and hypoglycemic workup except some adrenal cortisol level changes (For which ENDO was consulted)  - As per Dr. Murguia malnutrition is the cause of hypoglycemia.  - Consulted Dr. Colbert (nutrition support) - Acc to her malnutrition cannot cause hypoglycemia.   - As per Endocrinologist Dr. Talbot - Pt has never been hypoglycemic as never has symptoms (even with FS of 23). Therefor no need of FS and D5 was recommended   - Our DDs are Methadone induced vs adrenal insufficiency  - As per attending Check FS daily, added Hydrocortisone 10mg in am and 5 mg in Pm and Pt can follow up any Endocrinologist as an OP  RECOMMENDATIONS: On discharge pt must have sugar pills every time with him and whenever feel dizzy, nauseous or low FS should take these pills and get a follow up with ENDO as OP.    # Poor PO intake, difficulty swallowing  - dysphagia is not pharyngeal  - GI: Pt would benefits from EGD. After hypoglycemia corrected and after surgery, will do Outpatient  - GI EGDmay be tomorrow    # Hypothyroidism  - antibody work up sent: - thyroglobulin and thyroperoxidase negative thus far  - Thyroid ultrasounds: negative  - levothyroxine to 100 mcg daily  - Will follow TSH in 4 weeks    # Sacral OM and gangrene  - Wound care    # SILVINA on CKD + HAGMA  - non oliguric  - patient is refusing hd , no acute  - can increase if Anion gap increases  - no need for venofer sat in the 30 range, no need for LINDY will start once h <9  - f/u Phos, f/u Mag, f/u bmp    # Normocytic anemia  - Iron deficiency and CKD,  multifactorial  - s/p 1 unit pRBC on 11/27  - Patient denies any melena, or hematochezia, noted history of hemorrhoids    # HTN  - not on any medication  - off Amlodipine  - Echo results 55-60% , grade 1 pritesh dysfunction    # Opioid dependance  - Patient was on methadone 80 mg QID + Oxycodone 60mg QID  - was oversedated on admission, awake after narcan  - high risk for overdose given CKD  - As per attending patient is malingering to gain access to higher doses of oxycodone, which has side effects and contraindicated in his current condition  - f/up at pain management clinic to be tapered off methadone and oxycodone tx.  - Pain management consulted: Methadone 80mg q8, Oxycodone IR 50 q6 PRN, Tylenol q6 standing, Amitriptyline 25mg qHS for now (but he is taking more than what pain Mx recommended)    # Folate acid deficiency  - on folic acid po qd    # Vitamin D def  - 2000 qd daily    # Chronic sacral bedsore.  - Followed by Dr. Gooden    # Diet: Mechanical sost  # DVT Ppx: hep SC q8  # GI ppx: protonix bid  # Dispo: likely d/c on monday with /u with pain, endo, vascular, burn, nephro, pcp  # Code: FULL

## 2019-12-26 NOTE — PROGRESS NOTE ADULT - ATTENDING COMMENTS
59M w/ PVD s/p Rt AKA, paraplegia with multiple chronic bedsores and chronic  suprapubic catheter, HTN, CKD4, hypothyroidism, opioid dependance on methadone, presents w/ lt foot coldness and gangrene.  He is s/p Lt BKA.      #Chronic left foot peripheral artery disease + dry gangrene - s/p Lt BKA,  BKA closure done on 12/17. completed antibiotics    #recurrent severe hypoglycemia - likely due to severe hypothyroidism vs secondary adrenal insufficiency due to chronic opiates.   started on hydrocortisone, 10mg in AM, 5mg in PM.  Since then no more hypoglycemia. Observe off D5NS drip now.   Endocrine recs noted.     #Intermittent Dysphagia: ENT tried FFL patient couldn't tolerate. GI is planning an EGD but patient had fevers 12/24.    #Fevers 12/24: CXR shows increased markings at right base. We were thinking this could be PNA and gave 1 dose Vanc, cefepime but ID wants to watch him off abx.     #elevated prolactin - MRI pituitary ok  #severe protein calorie malnutrition/decreased PO intake - continue on marinol, patient has good appetite.  #anemia of chronic disease and CKD - s/p 1U PRBC, hb stable  #folic acid deficiency - continue folic acid  #CKD4 - Cr stable, on PO bicarb  #HTN - bp well controlled on current regimen  #opioid dependence - continue on methadone and oxycodone per pain mngmt.  #chronic sacral bedsores POA - f/u dr. pradhan  #hypothyroidism - w/ TSH above 80, on synthroid, patient is on Synthroid 100 MCG po once daily- f/up TSH level today to check if synthroid dose is adequate.  #hypomagnesmia - replete PRN  #vitamin D deficiency - supplement  #PPx - HSQ    Progress Note Handoff  Pending:  GI for EGD than d/c  Patient/Family discussion: discussed w/ pt at bedside  Disposition: d/c home with home care vs. SNF .

## 2019-12-26 NOTE — CHART NOTE - NSCHARTNOTEFT_GEN_A_CORE
Oxycodone medication auto-expiring. I renewed it for one day only. Please follow up on dosing. Patient has been receiving 60mg q6h but resident/attending notes show 50mg q6h.       Renew or change it as necessary in the daytime.

## 2019-12-26 NOTE — DISCHARGE NOTE NURSING/CASE MANAGEMENT/SOCIAL WORK - PATIENT PORTAL LINK FT
You can access the FollowMyHealth Patient Portal offered by [facility] by registering at the following website: [email or web address]. By joining [employer]’s FollowMyHealth portal, you will also be able to view your health information using other applications (apps) compatible with our system.

## 2019-12-27 NOTE — PRE-OP CHECKLIST - AS BP NONINV SITE
left upper arm
right upper arm

## 2019-12-27 NOTE — PROGRESS NOTE ADULT - SUBJECTIVE AND OBJECTIVE BOX
LENGTH OF HOSPITAL STAY: 31d    CHIEF COMPLAINT:   Patient is a 59y old  Male who presents with a chief complaint of Cold left lower extremity (26 Dec 2019 12:09)      Overnight events:    No acute events overnight    ALLERGIES:  sulfamethizole (Unknown)    MEDICATIONS:  STANDING MEDICATIONS  acetaminophen   Tablet .. 650 milliGRAM(s) Oral every 6 hours  amitriptyline 25 milliGRAM(s) Oral at bedtime  chlorhexidine 4% Liquid 1 Application(s) Topical <User Schedule>  cholecalciferol 2000 Unit(s) Oral daily  dextrose 10%. 250 milliLiter(s) IV Continuous <Continuous>  heparin  Injectable 5000 Unit(s) SubCutaneous every 8 hours  hydrocortisone 10 milliGRAM(s) Oral daily  hydrocortisone 5 milliGRAM(s) Oral daily  levothyroxine 100 MICROGram(s) Oral daily  methadone    Tablet 80 milliGRAM(s) Oral every 8 hours    PRN MEDICATIONS  diazepam    Tablet 5 milliGRAM(s) Oral three times a day PRN  oxyCODONE    IR 50 milliGRAM(s) Oral every 6 hours PRN    VITALS:   T(F): 97  HR: 78  BP: 109/76  RR: 18  SpO2: 98%    LABS:                        7.4    7.59  )-----------( 318      ( 27 Dec 2019 01:54 )             24.8     12-26    138  |  107  |  43<H>  ----------------------------<  85  4.4   |  18  |  3.4<H>    Ca    7.8<L>      26 Dec 2019 07:30  Phos  5.2     12-26  Mg     1.8     12-26                      Cultures:      RADIOLOGY:    PHYSICAL EXAM:  GEN: No acute distress  HEENT:   LUNGS: Clear to auscultation bilaterally   HEART: S1/S2 present. RRR.   ABD: Soft, non-tender, non-distended. Bowel sounds present  EXT:  NEURO: AAOX3

## 2019-12-27 NOTE — PRE-ANESTHESIA EVALUATION ADULT - NSANTHOSAYNRD_GEN_A_CORE
No. JORDAN screening performed.  STOP BANG Legend: 0-2 = LOW Risk; 3-4 = INTERMEDIATE Risk; 5-8 = HIGH Risk
see screening tool/No. JORDAN screening performed.  STOP BANG Legend: 0-2 = LOW Risk; 3-4 = INTERMEDIATE Risk; 5-8 = HIGH Risk
No. JORDAN screening performed.  STOP BANG Legend: 0-2 = LOW Risk; 3-4 = INTERMEDIATE Risk; 5-8 = HIGH Risk

## 2019-12-27 NOTE — PRE-ANESTHESIA EVALUATION ADULT - MALLAMPATI CLASS
Class III - visualization of the soft palate and the base of the uvula
Class III - visualization of the soft palate and the base of the uvula
Class I (easy) - visualization of the soft palate, fauces, uvula, and both anterior and posterior pillars
Class II - visualization of the soft palate, fauces, and uvula
Class III - visualization of the soft palate and the base of the uvula

## 2019-12-27 NOTE — DISCHARGE NOTE PROVIDER - CARE PROVIDER_API CALL
Blaise Muñiz)  Surgery; Vascular Surgery  [address] [place] [address]  Phone: [phone]  Fax: [phone]  Follow Up Time: 2 weeks    Endocrinologist,   For adrenal insufficiency and hypothyroidism  Phone: (   )    -  Fax: (   )    -  Follow Up Time: 2 weeks Blaise Muñiz)  Surgery; Vascular Surgery  [address] [place] [address], [place] [address]  Phone: [phone]  Fax: [phone]  Follow Up Time: 2 weeks    Endocrinologist,   For adrenal insufficiency and hypothyroidism  Phone: (   )    -  Fax: (   )    -  Follow Up Time: 2 weeks    Chaitanya Sanchez)  Gastroenterology  [address] [place] [address]  Phone: [phone]  Fax: [phone]  Follow Up Time: 2 weeks

## 2019-12-27 NOTE — PRE-ANESTHESIA EVALUATION ADULT - NSATTENDATTESTRD_GEN_ALL_CORE
The patient has been re-examined and I agree with the above assessment or I updated with my findings.

## 2019-12-27 NOTE — PRE-ANESTHESIA EVALUATION ADULT - NSPROPOSEDPROCEDFT_GEN_ALL_CORE
open left below knee amputation
Closure of left below knee amputation
Closure of left below the knee amputation
EGD

## 2019-12-27 NOTE — PRE-ANESTHESIA EVALUATION ADULT - NSANTHAPLANRD_GEN_ALL_CORE
monitored anesthesia care (MAC)/general
general
monitored anesthesia care (MAC)/general

## 2019-12-27 NOTE — DISCHARGE NOTE PROVIDER - CARE PROVIDERS DIRECT ADDRESSES
,[email or web address]_[email or web address],DirectAddress_Unknown,[email or web address]

## 2019-12-27 NOTE — DISCHARGE NOTE PROVIDER - NSDCCPCAREPLAN_GEN_ALL_CORE_FT
PRINCIPAL DISCHARGE DIAGNOSIS  Diagnosis: Cold foot with peripheral vascular disease  Assessment and Plan of Treatment: Chronic left foot peripheral artery disease + dry gangrene  Status post BKA on 12/2/2019. Closure on 12/17/2019. Follow up with Dr. Muñiz      SECONDARY DISCHARGE DIAGNOSES  Diagnosis: Poor oral hygiene  Assessment and Plan of Treatment: dysphagia is not pharyngeal  - GI EGD on 12/27 - esophageal ulcers, biopsy taken follow up OP with Dr. Sanchez    Diagnosis: Hypoglycemia  Assessment and Plan of Treatment: Unremarkable Pitutary protocol MRI of brain and hypoglycemic workup except some adrenal cortisol level changes   - Our DDs are Methadone induced vs adrenal insufficiency  - Etiology is still unknown Our RECOMMENDATIONS: you must have sugar pills every time with you and whenever you feel dizzy, nauseous or on FS check low FS should take these pills and get a follow up with ENDOCRIONOLOGY as OP for your hypoglycemia workup and in addition to that we increased your thyroxine dose. Please follow up with an Endocrinologist and get your Thyroid levels check and adjust your dose

## 2019-12-27 NOTE — PROGRESS NOTE ADULT - ATTENDING COMMENTS
59M w/ PVD s/p Rt AKA, paraplegia with multiple chronic bedsores and chronic  suprapubic catheter, HTN, CKD4, hypothyroidism, opioid dependance on methadone, presents w/ lt foot coldness and gangrene.  He is s/p Lt BKA.      #Chronic left foot peripheral artery disease + dry gangrene - s/p Lt BKA,  BKA closure done on 12/17. completed antibiotics    #recurrent severe hypoglycemia - likely due to severe hypothyroidism vs secondary adrenal insufficiency due to chronic opiates.   started on hydrocortisone will increase to 20 mg po once daily  Endocrine f/up post d/c home.    #Intermittent Dysphagia: s/p EGD by GI on 12/27- clean ulcers in gastric area, esophagitis, gastritis  - started on PPI twice daily, s/p biopsies - f/up results.  -patient was instructed to f/up with GI post d/c home      #elevated prolactin - MRI pituitary ok  #severe protein calorie malnutrition/decreased PO intake - continue on marinol, patient has good appetite.  #anemia of chronic disease and CKD - s/p 1U PRBC, hb stable  #folic acid deficiency - continue folic acid  #CKD4 - Cr stable, on PO bicarb  #HTN - bp well controlled on current regimen  #opioid dependence - continue on methadone and oxycodone per pain mngmt.  #chronic sacral bedsores POA - f/u dr. pradhan  #hypothyroidism - w/ TSH above 80, on synthroid, patient is on Synthroid 100 MCG po once daily- f/up TSH level trending down.  #hypomagnesmia - replete PRN  #vitamin D deficiency - supplement  #PPx - HSQ    Progress Note Handoff  Pending:  no further work up, d/c planning  Patient/Family discussion: discussed w/ pt at bedside  Disposition: d/c home with home care in 24 hours, f/up with OT if patient needs any equipment for home.

## 2019-12-27 NOTE — DISCHARGE NOTE PROVIDER - NSDCMRMEDTOKEN_GEN_ALL_CORE_FT
diazePAM 10 mg oral tablet: 1 tab(s) orally 2 times a day  levothyroxine 50 mcg (0.05 mg) oral tablet: 1 tab(s) orally once a day  methadone: 80 milligram(s) orally 4 times a day  oxybutynin 10 mg/24 hr oral tablet, extended release: 1 tab(s) orally 2 times a day  OxyCONTIN 60 mg oral tablet, extended release: 1 tab(s) orally 4 times a day amitriptyline 25 mg oral tablet: 1 tab(s) orally once a day (at bedtime)  diazePAM 10 mg oral tablet: 1 tab(s) orally 2 times a day  hydrocortisone 20 mg oral tablet: 1 tab(s) orally once a day  levothyroxine 100 mcg (0.1 mg) oral tablet: 1 tab(s) orally once a day  methadone: 80 milligram(s) orally 4 times a day  oxybutynin 10 mg/24 hr oral tablet, extended release: 1 tab(s) orally 2 times a day  OxyCONTIN 60 mg oral tablet, extended release: 1 tab(s) orally 4 times a day

## 2019-12-27 NOTE — PRE-OP CHECKLIST - PATIENT PROBLEMS/NEEDS
Patient expressed no known problems or needs

## 2019-12-27 NOTE — PROGRESS NOTE ADULT - ASSESSMENT
Mr. Booker is a 60 yo male patient with PMHx of PVD s/p Right AKA months ago, paraplegia with multiple chronic bedsores and suprapubic catheter, HTN (not on any medication), CKD stage IV (not following with nephrology), hypothyroidism, opioid dependance, recent admission due to unresponsiveness, presents for 3-4 days of left foot coldness, pain and worsening ulcers.    # Chronic left foot peripheral artery disease + dry gangrene  - S/p BKA on 12/2/2019. Closure on 12/17/2019.  - Closure was initially delayed because of recurrent hypoglycemic episodes and extensive endocrinal workup.    # Hypoglycemia  - hypoglycemia any-operatively  - Unremarkable Pitutary protocol MRI of brain and hypoglycemic workup except some adrenal cortisol level changes (For which ENDO was consulted)  - As per Dr. Murguia malnutrition is the cause of hypoglycemia.  - Consulted Dr. Colbert (nutrition support) - Acc to her malnutrition cannot cause hypoglycemia.   - As per Endocrinologist Dr. Talbot - Pt has never been hypoglycemic as never has symptoms (even with FS of 23). Therefor no need of FS and D5 was recommended   - Our DDs are Methadone induced vs adrenal insufficiency  - As per attending Check FS daily, added Hydrocortisone 10mg in am and 5 mg in Pm and Pt can follow up any Endocrinologist as an OP  RECOMMENDATIONS: On discharge pt must have sugar pills every time with him and whenever feel dizzy, nauseous or low FS should take these pills and get a follow up with ENDO as OP.    # Poor PO intake, difficulty swallowing  - dysphagia is not pharyngeal  - GI: Pt would benefits from EGD. After hypoglycemia corrected and after surgery, will do Outpatient  - GI EGD today and then discharge    # Hypothyroidism  - antibody work up sent: - thyroglobulin and thyroperoxidase negative thus far  - Thyroid ultrasounds: negative  - levothyroxine to 100 mcg daily  - Will follow TSH in 4 weeks    # Sacral OM and gangrene  - Wound care    # SILVINA on CKD + HAGMA  - non oliguric  - patient is refusing hd , no acute  - can increase if Anion gap increases  - no need for venofer sat in the 30 range, no need for LINDY will start once h <9  - f/u Phos, f/u Mag, f/u bmp    # Normocytic anemia  - Iron deficiency and CKD,  multifactorial  - s/p 1 unit pRBC on 11/27  - Patient denies any melena, or hematochezia, noted history of hemorrhoids    # HTN  - not on any medication  - off Amlodipine  - Echo results 55-60% , grade 1 pritesh dysfunction    # Opioid dependance  - Patient was on methadone 80 mg QID + Oxycodone 60mg QID  - was oversedated on admission, awake after narcan  - high risk for overdose given CKD  - As per attending patient is malingering to gain access to higher doses of oxycodone, which has side effects and contraindicated in his current condition  - f/up at pain management clinic to be tapered off methadone and oxycodone tx.  - Pain management consulted: Methadone 80mg q8, Oxycodone IR 50 q6 PRN, Tylenol q6 standing, Amitriptyline 25mg qHS for now (but he is taking more than what pain Mx recommended)    # Folate acid deficiency  - on folic acid po qd    # Vitamin D def  - 2000 qd daily    # Chronic sacral bedsore.  - Followed by Dr. Gooden    # Diet: Mechanical sost  # DVT Ppx: hep SC q8  # GI ppx: protonix bid  # Dispo: likely d/c on monday with /u with pain, endo, vascular, burn, nephro, pcp  # Code: FULL

## 2019-12-27 NOTE — PRE-OP CHECKLIST - NOTHING BY MOUTH SINCE
01-Dec-2019 23:59
04-Dec-2019 23:59
17-Dec-2019 00:00
28-Nov-2019
16-Dec-2019 00:00
26-Dec-2019 23:59
24-Dec-2019 00:00

## 2019-12-27 NOTE — PRE-OP CHECKLIST - ANTIBIOTIC
----- Message from Eddie Grigsby MD sent at 2/20/2017  9:19 AM EST -----  Tell her that the small bowel follow through came back normal. kj  
Called pt and advised per Dr Grigsby that the sbft came back normal.  Pt verb understanding.   
n/a
n/a
pt on abx
n/a

## 2019-12-27 NOTE — DISCHARGE NOTE PROVIDER - PROVIDER TOKENS
PROVIDER:[TOKEN:[[id number]],FOLLOWUP:[2 weeks]],FREE:[LAST:[Endocrinologist],PHONE:[(   )    -],FAX:[(   )    -],ADDRESS:[For adrenal insufficiency and hypothyroidism],FOLLOWUP:[2 weeks]] PROVIDER:[TOKEN:[[id number]],FOLLOWUP:[2 weeks]],FREE:[LAST:[Endocrinologist],PHONE:[(   )    -],FAX:[(   )    -],ADDRESS:[For adrenal insufficiency and hypothyroidism],FOLLOWUP:[2 weeks]],PROVIDER:[TOKEN:[[id number]],FOLLOWUP:[2 weeks]]

## 2019-12-27 NOTE — DISCHARGE NOTE PROVIDER - HOSPITAL COURSE
Mr. Booker is a 60 yo male patient with PMHx of PVD s/p Right AKA months ago, paraplegia with multiple chronic bedsores and suprapubic catheter, HTN (not on any medication), CKD stage IV (not following with nephrology), hypothyroidism, opioid dependance, recent admission due to unresponsiveness, presents for 3-4 days of left foot coldness, pain and worsening ulcers.    Admitted for Chronic left foot peripheral artery disease + dry gangrene    - S/p BKA on 12/2/2019. Closure on 12/17/2019.    - Closure was initially delayed because of recurrent hypoglycemic episodes and extensive endocrinal workup.    For Hypoglycemia    - Unremarkable Pitutary protocol MRI of brain and hypoglycemic workup except some adrenal cortisol level changes (For which ENDO was consulted)    - As per Dr. Murguia malnutrition is the cause of hypoglycemia.    - Consulted Dr. Colbert (nutrition support) - Acc to her malnutrition cannot cause hypoglycemia.     - As per Endocrinologist Dr. Talbot - Pt has never been hypoglycemic as never has symptoms (even with FS of 23). Therefor no need of FS and D5 was recommended     - Our DDs are Methadone induced vs adrenal insufficiency    - As per attending Check FS daily, added Hydrocortisone 10mg in am and 5 mg in Pm and Pt can follow up any Endocrinologist as an OP    RECOMMENDATIONS: On discharge pt must have sugar pills every time with him and whenever feel dizzy, nauseous or low FS should take these pills and get a follow up with ENDO as OP.    For hypothyroidism, we increased the dose of thyroxine. Please continue with this    # Sacral OM and gangrene    - Wound care    #For Ulcers in the esophagus please follow up with Dr. Sanchez in a month.

## 2019-12-28 NOTE — PROGRESS NOTE ADULT - ASSESSMENT
Mr. Booker is a 58 yo male patient with PMHx of PVD s/p Right AKA months ago, paraplegia with multiple chronic bedsores and suprapubic catheter, HTN (not on any medication), CKD stage IV (not following with nephrology), hypothyroidism, opioid dependance, recent admission due to unresponsiveness, presents for 3-4 days of left foot coldness, pain and worsening ulcers.    # Chronic left foot peripheral artery disease + dry gangrene  - S/p BKA on 12/2/2019. Closure on 12/17/2019.  - Closure was initially delayed because of recurrent hypoglycemic episodes and extensive endocrinal workup.    # Hypoglycemia  - hypoglycemia any-operatively  - Unremarkable Pitutary protocol MRI of brain and hypoglycemic workup except some adrenal cortisol level changes (For which ENDO was consulted)  - As per Dr. Murguia malnutrition is the cause of hypoglycemia.  - Consulted Dr. Colbert (nutrition support) - Acc to her malnutrition cannot cause hypoglycemia.   - As per Endocrinologist Dr. Talbot - Pt has never been hypoglycemic as never has symptoms (even with FS of 23). Therefor no need of FS and D5 was recommended   - Our DDs are Methadone induced vs adrenal insufficiency  - As per attending Check FS daily, added Hydrocortisone 10mg in am and 5 mg in Pm and Pt can follow up any Endocrinologist as an OP  RECOMMENDATIONS: On discharge pt must have sugar pills every time with him and whenever feel dizzy, nauseous or low FS should take these pills and get a follow up with ENDO as OP.    # Poor PO intake, difficulty swallowing  - dysphagia is not pharyngeal  - GI: Pt would benefits from EGD. After hypoglycemia corrected and after surgery, will do Outpatient  - GI EGD on 12/27 - esophageal ulcers, biopsy taken follow up OP with Dr. Sanchez    # Hypothyroidism  - antibody work up sent: - thyroglobulin and thyroperoxidase negative thus far  - Thyroid ultrasounds: negative  - levothyroxine to 100 mcg daily  - Will follow TSH in 4 weeks    # Sacral OM and gangrene  - Wound care    # SILVINA on CKD + HAGMA  - non oliguric  - patient is refusing hd , no acute  - can increase if Anion gap increases  - no need for venofer sat in the 30 range, no need for LINDY will start once h <9  - f/u Phos, f/u Mag, f/u bmp    # Normocytic anemia  - Iron deficiency and CKD,  multifactorial  - s/p 1 unit pRBC on 11/27  - Patient denies any melena, or hematochezia, noted history of hemorrhoids    # HTN  - not on any medication  - off Amlodipine  - Echo results 55-60% , grade 1 pritesh dysfunction    # Opioid dependance  - Patient was on methadone 80 mg QID + Oxycodone 60mg QID  - was oversedated on admission, awake after narcan  - high risk for overdose given CKD  - As per attending patient is malingering to gain access to higher doses of oxycodone, which has side effects and contraindicated in his current condition  - f/up at pain management clinic to be tapered off methadone and oxycodone tx.  - Pain management consulted: Methadone 80mg q8, Oxycodone IR 50 q6 PRN, Tylenol q6 standing, Amitriptyline 25mg qHS for now (but he is taking more than what pain Mx recommended)    # Folate acid deficiency  - on folic acid po qd    # Vitamin D def  - 2000 qd daily    # Chronic sacral bedsore.  - Followed by Dr. Gooden    # Diet: Mechanical sost  # DVT Ppx: hep SC q8  # GI ppx: protonix bid  # Dispo: likely d/c on monday with /u with pain, endo, vascular, burn, nephro, pcp  # Code: FULL Mr. Booker is a 58 yo male patient with PMHx of PVD s/p Right AKA months ago, paraplegia with multiple chronic bedsores and suprapubic catheter, HTN (not on any medication), CKD stage IV (not following with nephrology), hypothyroidism, opioid dependance, recent admission due to unresponsiveness, presents for 3-4 days of left foot coldness, pain and worsening ulcers.    # Chronic left foot peripheral artery disease + dry gangrene  - S/p BKA on 12/2/2019. Closure on 12/17/2019.  - Closure was initially delayed because of recurrent hypoglycemic episodes and extensive endocrinal workup.    # Hypoglycemia  - hypoglycemia any-operatively  - Unremarkable Pitutary protocol MRI of brain and hypoglycemic workup except some adrenal cortisol level changes (For which ENDO was consulted)  - As per Dr. Murguia malnutrition is the cause of hypoglycemia.  - Consulted Dr. Colbert (nutrition support) - Acc to her malnutrition cannot cause hypoglycemia.   - As per Endocrinologist Dr. Talbot - Pt has never been hypoglycemic as never has symptoms (even with FS of 23). Therefor no need of FS and D5 was recommended   - Our DDs are Methadone induced vs adrenal insufficiency  - As per attending Check FS daily, added Hydrocortisone 10mg in am and 5 mg in Pm and Pt can follow up any Endocrinologist as an OP  RECOMMENDATIONS: On discharge pt must have sugar pills every time with him and whenever feel dizzy, nauseous or low FS should take these pills and get a follow up with ENDO as OP.    # Poor PO intake, difficulty swallowing  - dysphagia is not pharyngeal  - GI: Pt would benefits from EGD. After hypoglycemia corrected and after surgery, will do Outpatient  - GI EGD on 12/27 - esophageal ulcers, biopsy taken follow up OP with Dr. Sanchez    # Hypothyroidism  - antibody work up sent: - thyroglobulin and thyroperoxidase negative thus far  - Thyroid ultrasounds: negative  - levothyroxine to 100 mcg daily  - Will follow TSH in 4 weeks    # Sacral OM and gangrene  - Wound care    # SILVINA on CKD + HAGMA  - non oliguric  - patient is refusing hd , no acute  - can increase if Anion gap increases  - no need for venofer sat in the 30 range, no need for LINDY will start once h <9  - f/u Phos, f/u Mag, f/u bmp    # Normocytic anemia  - Iron deficiency and CKD,  multifactorial  - s/p 1 unit pRBC on 11/27  - Patient denies any melena, or hematochezia, noted history of hemorrhoids    # HTN  - not on any medication  - off Amlodipine  - Echo results 55-60% , grade 1 pritesh dysfunction    # Opioid dependance  - Patient was on methadone 80 mg QID + Oxycodone 30mg QID PRN  - was oversedated on admission, awake after narcan  - high risk for overdose given CKD  - As per attending patient is malingering to gain access to higher doses of oxycodone, which has side effects and contraindicated in his current condition  - f/up at pain management clinic to be tapered off methadone and oxycodone tx.  - Pain management consulted: Methadone 80mg q8, Oxycodone IR 30 q8 PRN, Tylenol q6 standing, Amitriptyline 25mg qHS for now (but he is taking more than what pain Mx recommended)    # Folate acid deficiency  - on folic acid po qd    # Vitamin D def  - 2000 qd daily    # Chronic sacral bedsore.  - Followed by Dr. Gooden    # Diet: Mechanical sost  # DVT Ppx: hep SC q8  # GI ppx: protonix bid  # Dispo: likely d/c on monday with /u with pain, endo, vascular, burn, nephro, pcp  # Code: FULL

## 2019-12-28 NOTE — PROGRESS NOTE ADULT - SUBJECTIVE AND OBJECTIVE BOX
LENGTH OF HOSPITAL STAY: 32d    CHIEF COMPLAINT:   Patient is a 59y old  Male who presents with a chief complaint of Cold left lower extremity (27 Dec 2019 14:35)      Overnight events:    No acute events overnight    ALLERGIES:  sulfamethizole (Unknown)    MEDICATIONS:  STANDING MEDICATIONS  acetaminophen   Tablet .. 650 milliGRAM(s) Oral every 6 hours  amitriptyline 25 milliGRAM(s) Oral at bedtime  chlorhexidine 4% Liquid 1 Application(s) Topical <User Schedule>  cholecalciferol 2000 Unit(s) Oral daily  dextrose 10%. 1000 milliLiter(s) IV Continuous <Continuous>  heparin  Injectable 5000 Unit(s) SubCutaneous every 8 hours  hydrocortisone 20 milliGRAM(s) Oral daily  levothyroxine 100 MICROGram(s) Oral daily  methadone    Tablet 80 milliGRAM(s) Oral every 8 hours  pantoprazole    Tablet 40 milliGRAM(s) Oral two times a day    PRN MEDICATIONS  diazepam    Tablet 5 milliGRAM(s) Oral three times a day PRN  oxyCODONE    IR 30 milliGRAM(s) Oral every 8 hours PRN    VITALS:   T(F): 98.1  HR: 81  BP: 123/77  RR: 18  SpO2: 98%    LABS:                        8.7    12.78 )-----------( 289      ( 28 Dec 2019 09:52 )             27.5     12-28    138  |  108  |  43<H>  ----------------------------<  42<LL>  5.3<H>   |  16<L>  |  3.3<H>    Ca    7.5<L>      28 Dec 2019 09:52  Phos  5.7     12-28  Mg     1.7     12-28                      Cultures:      RADIOLOGY:    PHYSICAL EXAM:  GEN: No acute distress  HEENT: SUJIT  LUNGS: Clear to auscultation bilaterally   HEART: S1/S2 present. RRR.   ABD: Soft, non-tender, non-distended. Bowel sounds present  EXT:  R sided BKA noted, new left sided BKA ( dressing c/d/i)  NEURO: AAOX3

## 2019-12-28 NOTE — PROGRESS NOTE ADULT - ATTENDING COMMENTS
59M w/ PVD s/p Rt AKA, paraplegia with multiple chronic bedsores and chronic  suprapubic catheter, HTN, CKD4, hypothyroidism, opioid dependance on methadone, presents w/ lt foot coldness and gangrene.  He is s/p Lt BKA, which is healing well    #Chronic left foot peripheral artery disease + dry gangrene - s/p Lt BKA,  BKA closure done on 12/17. completed antibiotics, post op area healing well.    #recurrent severe hypoglycemia - likely due to severe hypothyroidism vs secondary adrenal insufficiency due to chronic opiates.   started on hydrocortisone will increase to 20 mg po once daily  Endocrine f/up post d/c home.    #Intermittent Dysphagia: s/p EGD by GI on 12/27- clean ulcers in gastric area, esophagitis, gastritis  - started on PPI twice daily, s/p biopsies - f/up results.  -patient was instructed to f/up with GI post d/c home      #elevated prolactin - MRI pituitary ok  #severe protein calorie malnutrition/decreased PO intake - continue on marinol, patient has good appetite.  #anemia of chronic disease and CKD - s/p 1U PRBC, hb stable  #folic acid deficiency - continue folic acid  #CKD4 - Cr stable, on PO bicarb  #HTN - bp well controlled on current regimen  #opioid dependence - continue on methadone and oxycodone per pain mngmt.  #chronic sacral bedsores POA - f/u dr. pradhan  #hypothyroidism - w/ TSH above 80, on synthroid, patient is on Synthroid 100 MCG po once daily- f/up TSH level trending down.  #hypomagnesmia - replete PRN  #vitamin D deficiency - supplement  #PPx - HSQ    Progress Note Handoff  Pending:  no further work up, d/c planning home will f/up OT for further outpatient home equipment requirement.   Patient/Family discussion: discussed w/ pt at bedside  Disposition: d/c home with home care.

## 2019-12-29 NOTE — PROGRESS NOTE ADULT - SUBJECTIVE AND OBJECTIVE BOX
MARGE BELLA  [facility] T2-3A 024 B ([facility] T2-3[facility])            Patient was evaluated and examined  by bedside, no active complains, looks sleepy today.                REVIEW OF SYSTEMS:  please see pertinent positives mentioned above, all other 12 ROS negative      T(C): , Max: 36.9 (12-29-19 @ 05:32)  HR: 84 (12-29-19 @ 05:32)  BP: 105/66 (12-29-19 @ 05:32)  RR: 18 (12-29-19 @ 05:32)  SpO2: --  CAPILLARY BLOOD GLUCOSE      POCT Blood Glucose.: 145 mg/dL (29 Dec 2019 11:32)  POCT Blood Glucose.: 106 mg/dL (29 Dec 2019 07:51)  POCT Blood Glucose.: 70 mg/dL (29 Dec 2019 02:02)  POCT Blood Glucose.: 89 mg/dL (28 Dec 2019 21:52)  POCT Blood Glucose.: 70 mg/dL (28 Dec 2019 16:55)      PHYSICAL EXAM:  General: NAD, AAOX3, patient is laying comfortably in bed, looks sleepy  HEENT: AT, NC, Supple, NO JVD, NO CB  Lungs: CTA B/L, no wheezing, no rhonchi  CVS: normal S1, S2, RRR, NO M/G/R  Abdomen: soft, bowel sounds present, non-tender, non-distended  Extremities: b/l bka status, left BKA stump- healing well, with staples. no edema, no clubbing, no cyanosis, positive peripheral pulses b/l  Neuro: no acute focal neurological deficits  Skin: sacral decub (POA)      LAB  CBC  Date: 12-29-19 @ 09:55  Mean cell Uwqvmajrjl14.0  Mean cell Hemoglobin Conc30.9  Mean cell Volum 90.9  Platelet count-Automate 283  RBC Count 3.28  Red Cell Distrib Width15.7  WBC Count9.02  % Albumin, Urine--  Hematocrit 29.8  Hemoglobin 9.2  CBC  Date: 12-28-19 @ 09:52  Mean cell Mvjihlidzg80.5  Mean cell Hemoglobin Conc31.6  Mean cell Volum 90.2  Platelet count-Automate 289  RBC Count 3.05  Red Cell Distrib Width15.5  WBC Count12.78  % Albumin, Urine--  Hematocrit 27.5  Hemoglobin 8.7  CBC  Date: 12-27-19 @ 01:54  Mean cell Mljhrxhwbq79.5  Mean cell Hemoglobin Conc29.8  Mean cell Volum 92.2  Platelet count-Automate 318  RBC Count 2.69  Red Cell Distrib Width15.5  WBC Count7.59  % Albumin, Urine--  Hematocrit 24.8  Hemoglobin 7.4  CBC  Date: 12-26-19 @ 07:30  Mean cell Lhzgawdhqd28.9  Mean cell Hemoglobin Conc30.7  Mean cell Volum 90.7  Platelet count-Automate 356  RBC Count 2.69  Red Cell Distrib Width15.4  WBC Count10.15  % Albumin, Urine--  Hematocrit 24.4  Hemoglobin 7.5  CBC  Date: 12-25-19 @ 07:45  Mean cell Hpxtiqqaam24.8  Mean cell Hemoglobin Conc30.9  Mean cell Volum 89.9  Platelet count-Automate 360  RBC Count 2.77  Red Cell Distrib Width15.0  WBC Count10.19  % Albumin, Urine--  Hematocrit 24.9  Hemoglobin 7.7  CBC  Date: 12-23-19 @ 22:20  Mean cell Jdssizwpdj95.1  Mean cell Hemoglobin Conc31.1  Mean cell Volum 90.4  Platelet count-Automate 369  RBC Count 2.70  Red Cell Distrib Width14.8  WBC Count7.77  % Albumin, Urine--  Hematocrit 24.4  Hemoglobin 7.6  CBC  Date: 12-23-19 @ 06:40  Mean cell Iwtriljfhe47.0  Mean cell Hemoglobin Conc30.4  Mean cell Volum 92.0  Platelet count-Automate 460  RBC Count 3.11  Red Cell Distrib Width14.9  WBC Count10.03  % Albumin, Urine--  Hematocrit 28.6  Hemoglobin 8.7    BMP  12-29-19 @ 09:55  Blood Gas Arterial-Calcium,Ionized--  Blood Urea Nitrogen, Serum 44 mg/dL<H> [10 - 20]  Carbon Dioxide, Serum16 mmol/L<L> [17 - 32]  Chloride, Oaerk655 mmol/L [98 - 110]  Creatinie, Serum3.2 mg/dL<H> [0.7 - 1.5]  Glucose, Zbigj929 mg/dL<H> [70 - 99]  Potassium, Serum5.5 mmol/L<H> [3.5 - 5.0]  Sodium, Serum 141 mmol/L [135 - 146]  BMP  12-28-19 @ 09:52  Blood Gas Arterial-Calcium,Ionized--  Blood Urea Nitrogen, Serum 43 mg/dL<H> [10 - 20]  Carbon Dioxide, Serum16 mmol/L<L> [17 - 32]  Chloride, Qtghq442 mmol/L [98 - 110]  Creatinie, Serum3.3 mg/dL<H> [0.7 - 1.5]  Glucose, Serum42 mg/dL<LL> [70 - 99] [CCritical value:  TYPE:(C=Critical, N=Notification, A=Abnormal) C  TESTS: GLU  DATE/TIME CALLED: 12/28/19 11:01  CALLED TO: DR. CRUZ  READ BACK (2 Patient Identifiers)(Y/N): Y  READ BACK VALUES (Y/N): Y  CALLED BY: MJM]  Potassium, Serum5.3 mmol/L<H> [3.5 - 5.0]  Sodium, Serum 138 mmol/L [135 - 146]  BMP  12-26-19 @ 07:30  Blood Gas Arterial-Calcium,Ionized--  Blood Urea Nitrogen, Serum 43 mg/dL<H> [10 - 20]  Carbon Dioxide, Serum18 mmol/L [17 - 32]  Chloride, Jueii476 mmol/L [98 - 110]  Creatinie, Serum3.4 mg/dL<H> [0.7 - 1.5]  Glucose, Serum85 mg/dL [70 - 99]  Potassium, Serum4.4 mmol/L [3.5 - 5.0]  Sodium, Serum 138 mmol/L [135 - 146]  BMP  12-25-19 @ 07:45  Blood Gas Arterial-Calcium,Ionized--  Blood Urea Nitrogen, Serum 42 mg/dL<H> [10 - 20]  Carbon Dioxide, Serum17 mmol/L [17 - 32]  Chloride, Szaek564 mmol/L [98 - 110]  Creatinie, Serum3.3 mg/dL<H> [0.7 - 1.5]  Glucose, Serum65 mg/dL<L> [70 - 99]  Potassium, Serum4.9 mmol/L [3.5 - 5.0]  Sodium, Serum 137 mmol/L [135 - 146]              Microbiology:    Culture - Blood (collected 12-23-19 @ 22:20)  Source: .Blood None  Final Report (12-29-19 @ 04:00):    No growth at 5 days.    Culture - Urine (collected 12-23-19 @ 16:13)  Source: .Urine Clean Catch (Midstream)  Final Report (12-25-19 @ 07:13):    >=3 organisms. Probable collection contamination.        Medications:  acetaminophen   Tablet .. 650 milliGRAM(s) Oral every 6 hours  amitriptyline 25 milliGRAM(s) Oral at bedtime  chlorhexidine 4% Liquid 1 Application(s) Topical <User Schedule>  cholecalciferol 2000 Unit(s) Oral daily  diazepam    Tablet 5 milliGRAM(s) Oral two times a day PRN  heparin  Injectable 5000 Unit(s) SubCutaneous every 8 hours  hydrocortisone 20 milliGRAM(s) Oral daily  levothyroxine 100 MICROGram(s) Oral daily  methadone    Tablet 80 milliGRAM(s) Oral every 8 hours  oxyCODONE    IR 30 milliGRAM(s) Oral every 8 hours PRN  pantoprazole    Tablet 40 milliGRAM(s) Oral two times a day        Assessment and Plan:  59M w/ PVD s/p Rt AKA, paraplegia with multiple chronic bedsores and chronic  suprapubic catheter, HTN, CKD4, hypothyroidism, opioid dependance/abuse on methadone, presents w/ lt foot coldness and gangrene.  He is s/p Lt BKA, which is healing well    #Chronic left foot peripheral artery disease + dry gangrene - s/p Lt BKA,  BKA closure done on 12/17. completed antibiotics, post op area healing well.    #recurrent severe hypoglycemia - likely due to severe hypothyroidism vs secondary adrenal insufficiency due to chronic opiates.   started on hydrocortisone will increase to 20 mg po once daily- now bsfs - improved glucose levels.   Endocrine f/up post d/c home.    #Intermittent Dysphagia: s/p EGD by GI on 12/27- clean ulcers in gastric area, esophagitis, gastritis  - started on PPI twice daily, s/p biopsies - f/up results.  -patient was instructed to f/up with GI post d/c home      #elevated prolactin - MRI pituitary ok  #severe protein calorie malnutrition/decreased PO intake - continue on marinol, patient has good appetite.  #anemia of chronic disease and CKD - s/p 1U PRBC, hb stable  #folic acid deficiency - continue folic acid  #CKD4 - Cr stable, on PO bicarb  #Hyperkalemia- tx.with 1 dose of kayexalate( pt. drinks Gatorade- I instructed pt. to stop drinking it since it can cause multiple electrolyte imbalances in view of his CKD)   #HTN - bp well controlled on current regimen  #opioid dependence/abuse - patient was found not to drink his pills and hide them in bed shits- security was called- patient's belongings was checked and pills were found and dis carted by covering nurse - NOW WE ARE OBSERVING HIM TO DRINK ALL THE PILLS, HE IS NOTED TO BE OVERLY SEDATED AT TIMES, AND HIS OXYCODONE DOSE WAS DECREASED TO 3O MG PO EVERY 8 HOURS AS NEEDED, SO PLEASE DO NOT INCREASE HIS DOSE OF OPIOIDS . continue on methadone and oxycodone per pain mngmt.  #chronic sacral bedsores POA - f/u dr. pradhan  #hypothyroidism - w/ TSH above 80, on synthroid, patient is on Synthroid 100 MCG po once daily- f/up TSH level trending down.  #hypomagnesmia - replete PRN  #vitamin D deficiency - supplement  #PPx - HSQ    Progress Note Handoff  Pending:  no further work up, d/c planning home will f/up OT for further outpatient home equipment requirement.   Patient/Family discussion: discussed w/ pt at bedside  Disposition: d/c home with home care HOPEFULLY ON MONDAY POST OT EVAL. PATIENT IS BEING ANTICIPATED FOR D/C HOME TOMORROW.

## 2019-12-29 NOTE — PROGRESS NOTE ADULT - NSHPATTENDINGPLANDISCUSS_GEN_ALL_CORE
house staff
housestaff.
care team
resident
house staff
resident
house staff
house staff
primary attending

## 2019-12-30 NOTE — CHART NOTE - NSCHARTNOTEFT_GEN_A_CORE
Mr. BookerAlmas, 59 year old male, needs drop arm commode secondary to his bilateral knee amputation requirements. Mr. BookerAlmas, 59 year old male, needs drop arm commode secondary to his bilateral below knee amputation requirements.

## 2019-12-30 NOTE — PROGRESS NOTE ADULT - SUBJECTIVE AND OBJECTIVE BOX
LENGTH OF HOSPITAL STAY: 34d    CHIEF COMPLAINT:   Patient is a 59y old  Male who presents with a chief complaint of Cold left lower extremity (29 Dec 2019 14:21)      Overnight events:    No acute events overnight    ALLERGIES:  sulfamethizole (Unknown)    MEDICATIONS:  STANDING MEDICATIONS  acetaminophen   Tablet .. 650 milliGRAM(s) Oral every 6 hours  amitriptyline 25 milliGRAM(s) Oral at bedtime  chlorhexidine 4% Liquid 1 Application(s) Topical <User Schedule>  cholecalciferol 2000 Unit(s) Oral daily  heparin  Injectable 5000 Unit(s) SubCutaneous every 8 hours  hydrocortisone 20 milliGRAM(s) Oral daily  levothyroxine 100 MICROGram(s) Oral daily  methadone    Tablet 80 milliGRAM(s) Oral every 8 hours  pantoprazole    Tablet 40 milliGRAM(s) Oral two times a day    PRN MEDICATIONS  diazepam    Tablet 5 milliGRAM(s) Oral two times a day PRN  oxyCODONE    IR 30 milliGRAM(s) Oral every 8 hours PRN    VITALS:   T(F): 98.2  HR: 86  BP: 119/75  RR: 18  SpO2: --    LABS:                        8.9    9.85  )-----------( 286      ( 30 Dec 2019 08:12 )             28.8     12-30    136  |  108  |  46<H>  ----------------------------<  61<L>  5.8<H>   |  16<L>  |  3.5<H>    Ca    7.3<L>      30 Dec 2019 08:12  Phos  6.1     12-30  Mg     1.4     12-30                      Cultures:      RADIOLOGY:    PHYSICAL EXAM:  GEN: No acute distress  HEENT: SUJIT  LUNGS: Clear to auscultation bilaterally   HEART: S1/S2 present. RRR.   ABD: Soft, non-tender, non-distended. Bowel sounds present  EXT:  R sided BKA noted, new left sided BKA ( dressing c/d/i)  NEURO: AAOX3

## 2019-12-30 NOTE — PROGRESS NOTE ADULT - ASSESSMENT
Mr. Booker is a 58 yo male patient with PMHx of PVD s/p Right AKA months ago, paraplegia with multiple chronic bedsores and suprapubic catheter, HTN (not on any medication), CKD stage IV (not following with nephrology), hypothyroidism, opioid dependance, recent admission due to unresponsiveness, presents for 3-4 days of left foot coldness, pain and worsening ulcers.    # Chronic left foot peripheral artery disease + dry gangrene  - S/p BKA on 12/2/2019. Closure on 12/17/2019.  - Closure was initially delayed because of recurrent hypoglycemic episodes and extensive endocrinal workup.    # Hypoglycemia  - hypoglycemia any-operatively  - Unremarkable Pitutary protocol MRI of brain and hypoglycemic workup except some adrenal cortisol level changes (For which ENDO was consulted)  - As per Dr. Murguia malnutrition is the cause of hypoglycemia.  - Consulted Dr. Colbert (nutrition support) - Acc to her malnutrition cannot cause hypoglycemia.   - As per Endocrinologist Dr. Talbot - Pt has never been hypoglycemic as never has symptoms (even with FS of 23). Therefor no need of FS and D5 was recommended   - Our DDs are Methadone induced vs adrenal insufficiency  - As per attending Check FS daily, added Hydrocortisone 10mg in am and 5 mg in Pm and Pt can follow up any Endocrinologist as an OP  RECOMMENDATIONS: On discharge pt must have sugar pills every time with him and whenever feel dizzy, nauseous or low FS should take these pills and get a follow up with ENDO as OP.    # Poor PO intake, difficulty swallowing  - dysphagia is not pharyngeal  - GI: Pt would benefits from EGD. After hypoglycemia corrected and after surgery, will do Outpatient  - GI EGD on 12/27 - esophageal ulcers, biopsy taken follow up OP with Dr. Sanchez    # Hypothyroidism  - antibody work up sent: - thyroglobulin and thyroperoxidase negative thus far  - Thyroid ultrasounds: negative  - levothyroxine to 100 mcg daily  - Will follow TSH in 4 weeks    # Sacral OM and gangrene  - Wound care    # SILVINA on CKD + HAGMA  - non oliguric  - patient is refusing hd , no acute  - can increase if Anion gap increases  - no need for venofer sat in the 30 range, no need for LINDY will start once h <9  - f/u Phos, f/u Mag, f/u bmp    # Normocytic anemia  - Iron deficiency and CKD,  multifactorial  - s/p 1 unit pRBC on 11/27  - Patient denies any melena, or hematochezia, noted history of hemorrhoids    # HTN  - not on any medication  - off Amlodipine  - Echo results 55-60% , grade 1 pritesh dysfunction    # Opioid dependance  - Patient was on methadone 80 mg QID + Oxycodone 30mg QID PRN  - was oversedated on admission, awake after narcan  - high risk for overdose given CKD  - As per attending patient is malingering to gain access to higher doses of oxycodone, which has side effects and contraindicated in his current condition  - f/up at pain management clinic to be tapered off methadone and oxycodone tx.  - Pain management consulted: Methadone 80mg q8, Oxycodone IR 30 q8 PRN, Tylenol q6 standing, Amitriptyline 25mg qHS for now (but he is taking more than what pain Mx recommended)    # Folate acid deficiency  - on folic acid po qd    # Vitamin D def  - 2000 qd daily    # Chronic sacral bedsore.  - Followed by Dr. Gooden    # Diet: Mechanical sost  # DVT Ppx: hep SC q8  # GI ppx: protonix bid  # Dispo: likely d/c on monday with /u with pain, endo, vascular, burn, nephro, pcp  # Code: FULL     Progressive Handsoff - Waiting for the Occupational Therapy for MR. Booker (which he is refusing) Mr. Booker is a 60 yo male patient with PMHx of PVD s/p Right AKA months ago, paraplegia with multiple chronic bedsores and suprapubic catheter, HTN (not on any medication), CKD stage IV (not following with nephrology), hypothyroidism, opioid dependance, recent admission due to unresponsiveness, presents for 3-4 days of left foot coldness, pain and worsening ulcers.    # Chronic left foot peripheral artery disease + dry gangrene  - S/p BKA on 12/2/2019. Closure on 12/17/2019.  - Closure was initially delayed because of recurrent hypoglycemic episodes and extensive endocrinal workup.    # Hypoglycemia  - hypoglycemia any-operatively  - Unremarkable Pitutary protocol MRI of brain and hypoglycemic workup except some adrenal cortisol level changes (For which ENDO was consulted)  - As per Dr. Murguia malnutrition is the cause of hypoglycemia.  - Consulted Dr. Colbert (nutrition support) - Acc to her malnutrition cannot cause hypoglycemia.   - As per Endocrinologist Dr. Talbot - Pt has never been hypoglycemic as never has symptoms (even with FS of 23). Therefor no need of FS and D5 was recommended   - Our DDs are Methadone induced vs adrenal insufficiency  - As per attending Check FS daily, added Hydrocortisone 10mg in am and 5 mg in Pm and Pt can follow up any Endocrinologist as an OP  RECOMMENDATIONS: On discharge pt must have sugar pills every time with him and whenever feel dizzy, nauseous or low FS should take these pills and get a follow up with ENDO as OP.    # Poor PO intake, difficulty swallowing  - dysphagia is not pharyngeal  - GI: Pt would benefits from EGD. After hypoglycemia corrected and after surgery, will do Outpatient  - GI EGD on 12/27 - esophageal ulcers, biopsy taken follow up OP with Dr. Sanchez    # Hypothyroidism  - antibody work up sent: - thyroglobulin and thyroperoxidase negative thus far  - Thyroid ultrasounds: negative  - levothyroxine to 100 mcg daily  - Will follow TSH in 4 weeks    # Sacral OM and gangrene  - Wound care    # SILVINA on CKD + HAGMA  - non oliguric  - patient is refusing hd , no acute  - can increase if Anion gap increases  - no need for venofer sat in the 30 range, no need for LINDY will start once h <9  - f/u Phos, f/u Mag, f/u bmp    # Normocytic anemia  - Iron deficiency and CKD,  multifactorial  - s/p 1 unit pRBC on 11/27  - Patient denies any melena, or hematochezia, noted history of hemorrhoids    # HTN  - not on any medication  - off Amlodipine  - Echo results 55-60% , grade 1 pritesh dysfunction    # Opioid dependance  - Patient is on methadone 80 mg QID + Oxycodone 30mg QID PRN  - was oversedated on admission, awake after narcan  - high risk for overdose given CKD  - As per attending patient is malingering to gain access to higher doses of oxycodone, which has side effects and contraindicated in his current condition  - patient was found not to drink his pills and hide them in bed shits- security was called- patient's belongings was checked and pills were found and dis carted by covering nurse - NOW WE ARE OBSERVING HIM TO DRINK ALL THE PILLS, HE IS NOTED TO BE OVERLY SEDATED AT TIMES, AND HIS OXYCODONE DOSE WAS DECREASED TO 3O MG PO EVERY 8 HOURS AS NEEDED, SO PLEASE DO NOT INCREASE HIS DOSE OF OPIOIDS . continue on methadone and oxycodone per pain mngmt.    # Folate acid deficiency  - on folic acid po qd    # Vitamin D def  - 2000 qd daily    # Chronic sacral bedsore.  - Followed by Dr. Gooden    # Diet: Mechanical sost  # DVT Ppx: hep SC q8  # GI ppx: protonix bid  # Dispo: likely d/c on monday with /u with pain, endo, vascular, burn, nephro, pcp  # Code: FULL     Progressive Handsoff - Waiting for the Occupational Therapy for MR. Booker (which he is refusing)

## 2019-12-30 NOTE — PROGRESS NOTE ADULT - ATTENDING COMMENTS
pt seen and examined independently  LLE stump healing well  majority of time spent discussing pain medications      #Chronic left foot peripheral artery disease + dry gangrene - s/p Lt BKA,  BKA closure done on 12/17. completed antibiotics, post op area healing well.  #recurrent severe hypoglycemia - likely due to severe hypothyroidism vs secondary adrenal insufficiency due to chronic opiates.   started on hydrocortisone will increase to 20 mg po once daily- now bsfs - improved glucose levels.  Endocrine f/up post d/c home.  #Intermittent Dysphagia: s/p EGD by GI on 12/27- clean ulcers in gastric area, esophagitis, gastritis.  started on PPI twice daily, s/p biopsies,  f/up results. -patient was instructed to f/up with GI post d/c home    #elevated prolactin - MRI pituitary ok  #severe protein calorie malnutrition/decreased PO intake - continue on marinol, patient has good appetite.  #anemia of chronic disease and CKD - s/p 1U PRBC, hb stable  #folic acid deficiency - continue folic acid  #CKD4 - Cr stable, on PO bicarb  #Hyperkalemia- tx.with 1 dose of kayexalate( pt. drinks Gatorade- I instructed pt. to stop drinking it since it can cause multiple electrolyte imbalances in view of his CKD)   #HTN - bp well controlled on current regimen  #opioid dependence/abuse - patient was found not to drink his pills and hide them in bed sheets- security was called- patient's belongings was checked and pills were found and discarded  by covering nurse - NOW WE ARE OBSERVING HIM TO DRINK ALL THE PILLS, HE IS NOTED TO BE OVERLY SEDATED AT TIMES, AND HIS OXYCODONE DOSE WAS DECREASED TO 3O MG PO EVERY 8 HOURS AS NEEDED, SO PLEASE DO NOT INCREASE HIS DOSE OF OPIOIDS . continue on methadone and oxycodone per pain mngmt.  #chronic sacral bedsores POA - f/u dr. pradhan  #hypothyroidism - w/ TSH above 80, on synthroid, patient is on Synthroid 100 MCG po once daily- f/up TSH level trending down.  #hypomagnesmia - replete PRN  #vitamin D deficiency - supplement  #PPx - HSQ    NEEDS OT EVAL AND EQUIPMENT TO BE DELIVERED HOME  no further w/u  medically stable for discharge, hopefully by tomorrow.

## 2019-12-31 NOTE — PROGRESS NOTE ADULT - ASSESSMENT
Mr. Booker is a 60 yo male patient with PMHx of PVD s/p Right AKA months ago, paraplegia with multiple chronic bedsores and suprapubic catheter, HTN (not on any medication), CKD stage IV (not following with nephrology), hypothyroidism, opioid dependance, recent admission due to unresponsiveness, presents for 3-4 days of left foot coldness, pain and worsening ulcers.    # Chronic left foot peripheral artery disease + dry gangrene  - S/p BKA on 12/2/2019. Closure on 12/17/2019.  - Closure was initially delayed because of recurrent hypoglycemic episodes and extensive endocrinal workup.    # Hypoglycemia  - hypoglycemia any-operatively  - Unremarkable Pitutary protocol MRI of brain and hypoglycemic workup except some adrenal cortisol level changes (For which ENDO was consulted)  - As per Dr. Murguia malnutrition is the cause of hypoglycemia.  - Consulted Dr. Colbert (nutrition support) - Acc to her malnutrition cannot cause hypoglycemia.   - As per Endocrinologist Dr. Talbot - Pt has never been hypoglycemic as never has symptoms (even with FS of 23). Therefor no need of FS and D5 was recommended   - Our DDs are Methadone induced vs adrenal insufficiency  - As per attending Check FS daily, added Hydrocortisone 10mg in am and 5 mg in Pm and Pt can follow up any Endocrinologist as an OP  RECOMMENDATIONS: On discharge pt must have sugar pills every time with him and whenever feel dizzy, nauseous or low FS should take these pills and get a follow up with ENDO as OP.    # Poor PO intake, difficulty swallowing  - dysphagia is not pharyngeal  - GI EGD on 12/27 - esophageal ulcers, biopsy taken follow up OP with Dr. Sanchez    # Hypothyroidism  - antibody work up sent: - thyroglobulin and thyroperoxidase negative thus far  - Thyroid ultrasounds: negative  - levothyroxine to 100 mcg daily  - Will follow TSH in 4 weeks, follow up with endocrinologist as OP    # Sacral OM and gangrene  - Wound care    # SILVINA on CKD + HAGMA  - non oliguric  - patient is refusing hd , no acute  - can increase if Anion gap increases  - no need for venofer sat in the 30 range, no need for LINDY will start once h <9  - f/u Phos, f/u Mag, f/u bmp    # Normocytic anemia  - Iron deficiency and CKD,  multifactorial  - s/p 1 unit pRBC on 11/27  - Patient denies any melena, or hematochezia, noted history of hemorrhoids    # HTN  - not on any medication  - off Amlodipine  - Echo results 55-60% , grade 1 pritesh dysfunction    # Opioid dependance  - Patient is on methadone 80 mg QID + Oxycodone 30mg QID PRN  - was oversedated on admission, awake after narcan  - high risk for overdose given CKD  - As per attending patient is malingering to gain access to higher doses of oxycodone, which has side effects and contraindicated in his current condition  - patient was found not to drink his pills and hide them in bed shits- security was called- patient's belongings was checked and pills were found and dis carted by covering nurse - NOW WE ARE OBSERVING HIM TO DRINK ALL THE PILLS, HE IS NOTED TO BE OVERLY SEDATED AT TIMES, AND HIS OXYCODONE DOSE WAS DECREASED TO 3O MG PO EVERY 8 HOURS AS NEEDED, SO PLEASE DO NOT INCREASE HIS DOSE OF OPIOIDS . continue on methadone and oxycodone per pain mngmt.    # Folate acid deficiency  - on folic acid po qd    # Vitamin D def  - 2000 qd daily    # Chronic sacral bedsore.  - Followed by Dr. Gooden    # Diet: Mechanical sost  # DVT Ppx: hep SC q8  # GI ppx: protonix bid  # Dispo: d/c today with /u with pain, endo, vascular, burn, nephro, pcp  # Code: FULL Mr. Booker is a 60 yo male patient with PMHx of PVD s/p Right AKA months ago, paraplegia with multiple chronic bedsores and suprapubic catheter, HTN (not on any medication), CKD stage IV (not following with nephrology), hypothyroidism, opioid dependance, recent admission due to unresponsiveness, presents for 3-4 days of left foot coldness, pain and worsening ulcers.    # Chronic left foot peripheral artery disease + dry gangrene  - S/p BKA on 12/2/2019. Closure on 12/17/2019.  - Closure was initially delayed because of recurrent hypoglycemic episodes and extensive endocrinal workup.    # Hypoglycemia  - hypoglycemia any-operatively  - Unremarkable Pitutary protocol MRI of brain and hypoglycemic workup except some adrenal cortisol level changes (For which ENDO was consulted)  - As per Dr. Murguia malnutrition is the cause of hypoglycemia.  - Consulted Dr. Colbert (nutrition support) - Acc to her malnutrition cannot cause hypoglycemia.   - As per Endocrinologist Dr. Talbot - Pt has never been hypoglycemic as never has symptoms (even with FS of 23). Therefor no need of FS and D5 was recommended   - Our DDs are Methadone induced vs adrenal insufficiency  - As per attending Check FS daily, added Hydrocortisone 20mg once a day Pt can follow up any Endocrinologist as an OP  RECOMMENDATIONS: On discharge pt must have sugar pills every time with him and whenever feel dizzy, nauseous or low FS should take these pills and get a follow up with ENDO as OP.    # Poor PO intake, difficulty swallowing  - dysphagia is not pharyngeal  - GI EGD on 12/27 - esophageal ulcers, biopsy taken follow up OP with Dr. Sanchez    # Hypothyroidism  - antibody work up sent: - thyroglobulin and thyroperoxidase negative thus far  - Thyroid ultrasounds: negative  - levothyroxine to 100 mcg daily  - Will follow TSH in 4 weeks, follow up with endocrinologist as OP    # Sacral OM and gangrene  - Wound care    # SILVINA on CKD + HAGMA  - non oliguric  - patient is refusing hd , no acute  - can increase if Anion gap increases  - no need for venofer sat in the 30 range, no need for LINDY will start once h <9  - f/u Phos, f/u Mag, f/u bmp    # Normocytic anemia  - Iron deficiency and CKD,  multifactorial  - s/p 1 unit pRBC on 11/27  - Patient denies any melena, or hematochezia, noted history of hemorrhoids    # HTN  - not on any medication  - off Amlodipine  - Echo results 55-60% , grade 1 pritesh dysfunction    # Opioid dependance  - Patient is on methadone 80 mg QID + Oxycodone 30mg QID PRN  - was oversedated on admission, awake after narcan  - high risk for overdose given CKD  - As per attending patient is malingering to gain access to higher doses of oxycodone, which has side effects and contraindicated in his current condition  - patient was found not to drink his pills and hide them in bed shits- security was called- patient's belongings was checked and pills were found and dis carted by covering nurse - NOW WE ARE OBSERVING HIM TO DRINK ALL THE PILLS, HE IS NOTED TO BE OVERLY SEDATED AT TIMES, AND HIS OXYCODONE DOSE WAS DECREASED TO 3O MG PO EVERY 8 HOURS AS NEEDED, SO PLEASE DO NOT INCREASE HIS DOSE OF OPIOIDS . continue on methadone and oxycodone per pain mngmt.    # Folate acid deficiency  - on folic acid po qd    # Vitamin D def  - 2000 qd daily    # Chronic sacral bedsore.  - Followed by Dr. Gooden    # Diet: Mechanical sost  # DVT Ppx: hep SC q8  # GI ppx: protonix bid  # Code: FULL   # Dispo: Discharged today with follow up with PCP, Endocrinology, vascular surgeons, pain management  Patient refusing physical home PT   Patient is refusing wound care visiting nurse  Patient is refusing to go to SNF  Patient states he has friends that will take care of him  Patient is refusing to give information of his friends.

## 2019-12-31 NOTE — PROGRESS NOTE ADULT - ATTENDING COMMENTS
pt seen and examined independently  LLE stump healing well  pt spent many minutes discussing his oxycodone regimen and what he gets as outpatient  falling asleep mid conversation    #Chronic left foot peripheral artery disease + dry gangrene - s/p Lt BKA,  BKA closure done on 12/17. completed antibiotics, post op area healing well.  #recurrent severe hypoglycemia - likely due to severe hypothyroidism vs secondary adrenal insufficiency due to chronic opiates.   started on hydrocortisone will increase to 20 mg po once daily- now bsfs - improved glucose levels.  Endocrine f/up post d/c home.  #Intermittent Dysphagia: s/p EGD by GI on 12/27- clean ulcers in gastric area, esophagitis, gastritis.  started on PPI twice daily, s/p biopsies,  f/up results. -patient was instructed to f/up with GI post d/c home    #elevated prolactin - MRI pituitary ok  #severe protein calorie malnutrition/decreased PO intake - continue on marinol, patient has good appetite.  #anemia of chronic disease and CKD - s/p 1U PRBC, hb stable  #folic acid deficiency - continue folic acid  #CKD4 - Cr stable, on PO bicarb  #Hyperkalemia- tx.with 1 dose of kayexalate( pt. drinks Gatorade- I instructed pt. to stop drinking it since it can cause multiple electrolyte imbalances in view of his CKD)   #HTN - bp well controlled on current regimen  #opioid dependence/abuse - patient was found not to drink his pills and hide them in bed sheets- security was called- patient's belongings was checked and pills were found and discarded  by covering nurse - NOW WE ARE OBSERVING HIM TO DRINK ALL THE PILLS, HE IS NOTED TO BE OVERLY SEDATED AT TIMES, AND HIS OXYCODONE DOSE WAS DECREASED TO 3O MG PO EVERY 8 HOURS AS NEEDED, SO PLEASE DO NOT INCREASE HIS DOSE OF OPIOIDS . continue on methadone and oxycodone per pain mngmt.  #chronic sacral bedsores POA - f/u dr. pradhan  #hypothyroidism - w/ TSH above 80, on synthroid, patient is on Synthroid 100 MCG po once daily- f/up TSH level trending down.  #hypomagnesmia - replete PRN  #vitamin D deficiency - supplement  #PPx - HSQ    no further w/u needed at this time  medically stable for discharge to home today  he is refusing rehab placement, AOx3  likely will appeal discharge

## 2019-12-31 NOTE — PROGRESS NOTE ADULT - SUBJECTIVE AND OBJECTIVE BOX
LENGTH OF HOSPITAL STAY: 35d    CHIEF COMPLAINT:   Patient is a 59y old  Male who presents with a chief complaint of Cold left lower extremity (30 Dec 2019 11:41)      Overnight events:    No acute events overnight, complaining of pain and demanding more pain meds in morning    ALLERGIES:  sulfamethizole (Unknown)    MEDICATIONS:  STANDING MEDICATIONS  acetaminophen   Tablet .. 650 milliGRAM(s) Oral every 6 hours  amitriptyline 25 milliGRAM(s) Oral at bedtime  chlorhexidine 4% Liquid 1 Application(s) Topical <User Schedule>  cholecalciferol 2000 Unit(s) Oral daily  heparin  Injectable 5000 Unit(s) SubCutaneous every 8 hours  hydrocortisone 20 milliGRAM(s) Oral daily  levothyroxine 100 MICROGram(s) Oral daily  methadone    Tablet 80 milliGRAM(s) Oral every 8 hours  pantoprazole    Tablet 40 milliGRAM(s) Oral two times a day    PRN MEDICATIONS  diazepam    Tablet 5 milliGRAM(s) Oral two times a day PRN  oxyCODONE    IR 30 milliGRAM(s) Oral every 8 hours PRN    VITALS:   T(F): 97.7  HR: 83  BP: 141/65  RR: 18  SpO2: 97%    LABS:                        8.9    11.77 )-----------( 281      ( 31 Dec 2019 08:07 )             28.5     12-31    134<L>  |  105  |  47<H>  ----------------------------<  89  5.6<H>   |  16<L>  |  3.5<H>    Ca    7.5<L>      31 Dec 2019 08:07  Phos  6.6     12-31  Mg     1.7     12-31                      Cultures:      RADIOLOGY:    PHYSICAL EXAM:  GEN: No acute distress  HEENT: SUJIT  LUNGS: Clear to auscultation bilaterally   HEART: S1/S2 present. RRR.   ABD: Soft, non-tender, non-distended. Bowel sounds present  EXT:  R sided BKA noted, new left sided BKA ( dressing c/d/i)  NEURO: AAOX3

## 2020-01-01 ENCOUNTER — EMERGENCY (EMERGENCY)
Facility: HOSPITAL | Age: 60
LOS: 0 days | Discharge: HOME | End: 2020-01-08
Attending: EMERGENCY MEDICINE
Payer: MEDICARE

## 2020-01-01 ENCOUNTER — INPATIENT (INPATIENT)
Facility: HOSPITAL | Age: 60
LOS: 17 days | Discharge: HOSPICE | End: 2020-01-28
Attending: INTERNAL MEDICINE | Admitting: INTERNAL MEDICINE
Payer: MEDICARE

## 2020-01-01 ENCOUNTER — TRANSCRIPTION ENCOUNTER (OUTPATIENT)
Age: 60
End: 2020-01-01

## 2020-01-01 ENCOUNTER — INPATIENT (INPATIENT)
Facility: HOSPITAL | Age: 60
LOS: 13 days | End: 2020-02-11
Attending: INTERNAL MEDICINE | Admitting: INTERNAL MEDICINE
Payer: MEDICARE

## 2020-01-01 ENCOUNTER — INBOUND DOCUMENT (OUTPATIENT)
Age: 60
End: 2020-01-01

## 2020-01-01 VITALS
SYSTOLIC BLOOD PRESSURE: 137 MMHG | OXYGEN SATURATION: 98 % | TEMPERATURE: 98 F | WEIGHT: 149.91 LBS | RESPIRATION RATE: 19 BRPM | DIASTOLIC BLOOD PRESSURE: 75 MMHG | HEART RATE: 92 BPM

## 2020-01-01 VITALS
SYSTOLIC BLOOD PRESSURE: 112 MMHG | OXYGEN SATURATION: 96 % | RESPIRATION RATE: 20 BRPM | TEMPERATURE: 96 F | DIASTOLIC BLOOD PRESSURE: 66 MMHG | HEART RATE: 94 BPM

## 2020-01-01 VITALS
DIASTOLIC BLOOD PRESSURE: 72 MMHG | HEART RATE: 88 BPM | OXYGEN SATURATION: 97 % | TEMPERATURE: 97 F | SYSTOLIC BLOOD PRESSURE: 125 MMHG | RESPIRATION RATE: 18 BRPM

## 2020-01-01 VITALS
DIASTOLIC BLOOD PRESSURE: 58 MMHG | TEMPERATURE: 98 F | SYSTOLIC BLOOD PRESSURE: 105 MMHG | HEART RATE: 104 BPM | RESPIRATION RATE: 18 BRPM | OXYGEN SATURATION: 98 %

## 2020-01-01 VITALS
HEART RATE: 94 BPM | RESPIRATION RATE: 20 BRPM | SYSTOLIC BLOOD PRESSURE: 116 MMHG | TEMPERATURE: 98 F | DIASTOLIC BLOOD PRESSURE: 71 MMHG | OXYGEN SATURATION: 96 %

## 2020-01-01 VITALS
SYSTOLIC BLOOD PRESSURE: 116 MMHG | HEART RATE: 87 BPM | TEMPERATURE: 98 F | DIASTOLIC BLOOD PRESSURE: 73 MMHG | RESPIRATION RATE: 15 BRPM

## 2020-01-01 DIAGNOSIS — Z89.421 ACQUIRED ABSENCE OF OTHER RIGHT TOE(S): Chronic | ICD-10-CM

## 2020-01-01 DIAGNOSIS — E87.5 HYPERKALEMIA: ICD-10-CM

## 2020-01-01 DIAGNOSIS — F11.29 OPIOID DEPENDENCE WITH UNSPECIFIED OPIOID-INDUCED DISORDER: ICD-10-CM

## 2020-01-01 DIAGNOSIS — Z98.890 OTHER SPECIFIED POSTPROCEDURAL STATES: Chronic | ICD-10-CM

## 2020-01-01 DIAGNOSIS — N39.0 URINARY TRACT INFECTION, SITE NOT SPECIFIED: ICD-10-CM

## 2020-01-01 DIAGNOSIS — E11.649 TYPE 2 DIABETES MELLITUS WITH HYPOGLYCEMIA WITHOUT COMA: ICD-10-CM

## 2020-01-01 DIAGNOSIS — E03.9 HYPOTHYROIDISM, UNSPECIFIED: ICD-10-CM

## 2020-01-01 DIAGNOSIS — Z89.512 ACQUIRED ABSENCE OF LEFT LEG BELOW KNEE: Chronic | ICD-10-CM

## 2020-01-01 DIAGNOSIS — Z89.611 ACQUIRED ABSENCE OF RIGHT LEG ABOVE KNEE: ICD-10-CM

## 2020-01-01 DIAGNOSIS — I12.9 HYPERTENSIVE CHRONIC KIDNEY DISEASE WITH STAGE 1 THROUGH STAGE 4 CHRONIC KIDNEY DISEASE, OR UNSPECIFIED CHRONIC KIDNEY DISEASE: ICD-10-CM

## 2020-01-01 DIAGNOSIS — E11.51 TYPE 2 DIABETES MELLITUS WITH DIABETIC PERIPHERAL ANGIOPATHY WITHOUT GANGRENE: ICD-10-CM

## 2020-01-01 DIAGNOSIS — A41.02 SEPSIS DUE TO METHICILLIN RESISTANT STAPHYLOCOCCUS AUREUS: ICD-10-CM

## 2020-01-01 DIAGNOSIS — E83.42 HYPOMAGNESEMIA: ICD-10-CM

## 2020-01-01 DIAGNOSIS — N18.4 CHRONIC KIDNEY DISEASE, STAGE 4 (SEVERE): ICD-10-CM

## 2020-01-01 DIAGNOSIS — D63.1 ANEMIA IN CHRONIC KIDNEY DISEASE: ICD-10-CM
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
## 2020-01-01 DIAGNOSIS — Z89.511 ACQUIRED ABSENCE OF RIGHT LEG BELOW KNEE: Chronic | ICD-10-CM

## 2020-01-01 DIAGNOSIS — B37.81 CANDIDAL ESOPHAGITIS: ICD-10-CM

## 2020-01-01 DIAGNOSIS — L89.314 PRESSURE ULCER OF RIGHT BUTTOCK, STAGE 4: ICD-10-CM
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
## 2020-01-01 DIAGNOSIS — F32.9 MAJOR DEPRESSIVE DISORDER, SINGLE EPISODE, UNSPECIFIED: ICD-10-CM

## 2020-01-01 DIAGNOSIS — T40.3X5A ADVERSE EFFECT OF METHADONE, INITIAL ENCOUNTER: ICD-10-CM

## 2020-01-01 DIAGNOSIS — M86.68 OTHER CHRONIC OSTEOMYELITIS, OTHER SITE: ICD-10-CM

## 2020-01-01 DIAGNOSIS — E11.22 TYPE 2 DIABETES MELLITUS WITH DIABETIC CHRONIC KIDNEY DISEASE: ICD-10-CM

## 2020-01-01 DIAGNOSIS — Z89.512 ACQUIRED ABSENCE OF LEFT LEG BELOW KNEE: ICD-10-CM

## 2020-01-01 DIAGNOSIS — Z66 DO NOT RESUSCITATE: ICD-10-CM

## 2020-01-01 DIAGNOSIS — E55.9 VITAMIN D DEFICIENCY, UNSPECIFIED: ICD-10-CM

## 2020-01-01 DIAGNOSIS — L89.154 PRESSURE ULCER OF SACRAL REGION, STAGE 4: ICD-10-CM

## 2020-01-01 DIAGNOSIS — R65.21 SEVERE SEPSIS WITH SEPTIC SHOCK: ICD-10-CM

## 2020-01-01 DIAGNOSIS — E11.69 TYPE 2 DIABETES MELLITUS WITH OTHER SPECIFIED COMPLICATION: ICD-10-CM

## 2020-01-01 DIAGNOSIS — E87.2 ACIDOSIS: ICD-10-CM

## 2020-01-01 DIAGNOSIS — G82.20 PARAPLEGIA, UNSPECIFIED: ICD-10-CM

## 2020-01-01 DIAGNOSIS — E43 UNSPECIFIED SEVERE PROTEIN-CALORIE MALNUTRITION: ICD-10-CM

## 2020-01-01 DIAGNOSIS — F43.20 ADJUSTMENT DISORDER, UNSPECIFIED: ICD-10-CM

## 2020-01-01 LAB
-  AMPICILLIN/SULBACTAM: SIGNIFICANT CHANGE UP
-  CEFAZOLIN: SIGNIFICANT CHANGE UP
-  CLINDAMYCIN: SIGNIFICANT CHANGE UP
-  DAPTOMYCIN: SIGNIFICANT CHANGE UP
-  ERYTHROMYCIN: SIGNIFICANT CHANGE UP
-  GENTAMICIN: SIGNIFICANT CHANGE UP
-  LINEZOLID: SIGNIFICANT CHANGE UP
-  OXACILLIN: SIGNIFICANT CHANGE UP
-  PENICILLIN: SIGNIFICANT CHANGE UP
-  RIFAMPIN: SIGNIFICANT CHANGE UP
-  TETRACYCLINE: SIGNIFICANT CHANGE UP
-  TRIMETHOPRIM/SULFAMETHOXAZOLE: SIGNIFICANT CHANGE UP
-  VANCOMYCIN: SIGNIFICANT CHANGE UP
ALBUMIN SERPL ELPH-MCNC: 1.1 G/DL — LOW (ref 3.5–5.2)
ALBUMIN SERPL ELPH-MCNC: 1.3 G/DL — LOW (ref 3.5–5.2)
ALBUMIN SERPL ELPH-MCNC: 1.4 G/DL — LOW (ref 3.5–5.2)
ALBUMIN SERPL ELPH-MCNC: 1.5 G/DL — LOW (ref 3.5–5.2)
ALBUMIN SERPL ELPH-MCNC: 1.5 G/DL — LOW (ref 3.5–5.2)
ALP SERPL-CCNC: 148 U/L — HIGH (ref 30–115)
ALP SERPL-CCNC: 150 U/L — HIGH (ref 30–115)
ALP SERPL-CCNC: 152 U/L — HIGH (ref 30–115)
ALP SERPL-CCNC: 153 U/L — HIGH (ref 30–115)
ALP SERPL-CCNC: 157 U/L — HIGH (ref 30–115)
ALP SERPL-CCNC: 159 U/L — HIGH (ref 30–115)
ALP SERPL-CCNC: 169 U/L — HIGH (ref 30–115)
ALP SERPL-CCNC: 172 U/L — HIGH (ref 30–115)
ALP SERPL-CCNC: 178 U/L — HIGH (ref 30–115)
ALP SERPL-CCNC: 181 U/L — HIGH (ref 30–115)
ALT FLD-CCNC: 10 U/L — SIGNIFICANT CHANGE UP (ref 0–41)
ALT FLD-CCNC: 11 U/L — SIGNIFICANT CHANGE UP (ref 0–41)
ALT FLD-CCNC: 15 U/L — SIGNIFICANT CHANGE UP (ref 0–41)
ALT FLD-CCNC: 16 U/L — SIGNIFICANT CHANGE UP (ref 0–41)
ALT FLD-CCNC: 6 U/L — SIGNIFICANT CHANGE UP (ref 0–41)
ALT FLD-CCNC: 6 U/L — SIGNIFICANT CHANGE UP (ref 0–41)
ALT FLD-CCNC: 7 U/L — SIGNIFICANT CHANGE UP (ref 0–41)
ALT FLD-CCNC: 8 U/L — SIGNIFICANT CHANGE UP (ref 0–41)
ANION GAP SERPL CALC-SCNC: 11 MMOL/L — SIGNIFICANT CHANGE UP (ref 7–14)
ANION GAP SERPL CALC-SCNC: 12 MMOL/L — SIGNIFICANT CHANGE UP (ref 7–14)
ANION GAP SERPL CALC-SCNC: 13 MMOL/L — SIGNIFICANT CHANGE UP (ref 7–14)
ANION GAP SERPL CALC-SCNC: 14 MMOL/L — SIGNIFICANT CHANGE UP (ref 7–14)
ANION GAP SERPL CALC-SCNC: 15 MMOL/L — HIGH (ref 7–14)
ANION GAP SERPL CALC-SCNC: 16 MMOL/L — HIGH (ref 7–14)
ANION GAP SERPL CALC-SCNC: 18 MMOL/L — HIGH (ref 7–14)
ANISOCYTOSIS BLD QL: SLIGHT — SIGNIFICANT CHANGE UP
APPEARANCE UR: ABNORMAL
AST SERPL-CCNC: 10 U/L — SIGNIFICANT CHANGE UP (ref 0–41)
AST SERPL-CCNC: 11 U/L — SIGNIFICANT CHANGE UP (ref 0–41)
AST SERPL-CCNC: 11 U/L — SIGNIFICANT CHANGE UP (ref 0–41)
AST SERPL-CCNC: 12 U/L — SIGNIFICANT CHANGE UP (ref 0–41)
AST SERPL-CCNC: 13 U/L — SIGNIFICANT CHANGE UP (ref 0–41)
AST SERPL-CCNC: 15 U/L — SIGNIFICANT CHANGE UP (ref 0–41)
AST SERPL-CCNC: 15 U/L — SIGNIFICANT CHANGE UP (ref 0–41)
AST SERPL-CCNC: 24 U/L — SIGNIFICANT CHANGE UP (ref 0–41)
AST SERPL-CCNC: 8 U/L — SIGNIFICANT CHANGE UP (ref 0–41)
AST SERPL-CCNC: 9 U/L — SIGNIFICANT CHANGE UP (ref 0–41)
BACTERIA # UR AUTO: ABNORMAL
BASE EXCESS BLDA CALC-SCNC: -10.6 MMOL/L — LOW (ref -2–2)
BASE EXCESS BLDV CALC-SCNC: -10.3 MMOL/L — LOW (ref -2–2)
BASE EXCESS BLDV CALC-SCNC: -10.6 MMOL/L — LOW (ref -2–2)
BASOPHILS # BLD AUTO: 0 K/UL — SIGNIFICANT CHANGE UP (ref 0–0.2)
BASOPHILS # BLD AUTO: 0.07 K/UL — SIGNIFICANT CHANGE UP (ref 0–0.2)
BASOPHILS # BLD AUTO: 0.08 K/UL — SIGNIFICANT CHANGE UP (ref 0–0.2)
BASOPHILS # BLD AUTO: 0.11 K/UL — SIGNIFICANT CHANGE UP (ref 0–0.2)
BASOPHILS # BLD AUTO: 0.13 K/UL — SIGNIFICANT CHANGE UP (ref 0–0.2)
BASOPHILS # BLD AUTO: 0.13 K/UL — SIGNIFICANT CHANGE UP (ref 0–0.2)
BASOPHILS # BLD AUTO: 0.14 K/UL — SIGNIFICANT CHANGE UP (ref 0–0.2)
BASOPHILS # BLD AUTO: 0.15 K/UL — SIGNIFICANT CHANGE UP (ref 0–0.2)
BASOPHILS # BLD AUTO: 0.15 K/UL — SIGNIFICANT CHANGE UP (ref 0–0.2)
BASOPHILS # BLD AUTO: 0.16 K/UL — SIGNIFICANT CHANGE UP (ref 0–0.2)
BASOPHILS # BLD AUTO: 0.19 K/UL — SIGNIFICANT CHANGE UP (ref 0–0.2)
BASOPHILS # BLD AUTO: 0.19 K/UL — SIGNIFICANT CHANGE UP (ref 0–0.2)
BASOPHILS NFR BLD AUTO: 0 % — SIGNIFICANT CHANGE UP (ref 0–1)
BASOPHILS NFR BLD AUTO: 0.4 % — SIGNIFICANT CHANGE UP (ref 0–1)
BASOPHILS NFR BLD AUTO: 0.5 % — SIGNIFICANT CHANGE UP (ref 0–1)
BASOPHILS NFR BLD AUTO: 0.6 % — SIGNIFICANT CHANGE UP (ref 0–1)
BASOPHILS NFR BLD AUTO: 0.6 % — SIGNIFICANT CHANGE UP (ref 0–1)
BASOPHILS NFR BLD AUTO: 0.7 % — SIGNIFICANT CHANGE UP (ref 0–1)
BASOPHILS NFR BLD AUTO: 0.8 % — SIGNIFICANT CHANGE UP (ref 0–1)
BASOPHILS NFR BLD AUTO: 0.8 % — SIGNIFICANT CHANGE UP (ref 0–1)
BASOPHILS NFR BLD AUTO: 0.9 % — SIGNIFICANT CHANGE UP (ref 0–1)
BASOPHILS NFR BLD AUTO: 1 % — SIGNIFICANT CHANGE UP (ref 0–1)
BASOPHILS NFR BLD AUTO: 1.1 % — HIGH (ref 0–1)
BASOPHILS NFR BLD AUTO: 1.2 % — HIGH (ref 0–1)
BASOPHILS NFR BLD AUTO: 1.2 % — HIGH (ref 0–1)
BASOPHILS NFR BLD AUTO: 1.3 % — HIGH (ref 0–1)
BASOPHILS NFR BLD AUTO: 1.7 % — HIGH (ref 0–1)
BASOPHILS NFR BLD AUTO: 1.7 % — HIGH (ref 0–1)
BASOPHILS NFR BLD AUTO: 2 % — HIGH (ref 0–1)
BILIRUB SERPL-MCNC: 0.2 MG/DL — SIGNIFICANT CHANGE UP (ref 0.2–1.2)
BILIRUB SERPL-MCNC: <0.2 MG/DL — SIGNIFICANT CHANGE UP (ref 0.2–1.2)
BILIRUB UR-MCNC: NEGATIVE — SIGNIFICANT CHANGE UP
BLD GP AB SCN SERPL QL: SIGNIFICANT CHANGE UP
BUN SERPL-MCNC: 48 MG/DL — HIGH (ref 10–20)
BUN SERPL-MCNC: 49 MG/DL — HIGH (ref 10–20)
BUN SERPL-MCNC: 50 MG/DL — HIGH (ref 10–20)
BUN SERPL-MCNC: 50 MG/DL — HIGH (ref 10–20)
BUN SERPL-MCNC: 54 MG/DL — HIGH (ref 10–20)
BUN SERPL-MCNC: 55 MG/DL — HIGH (ref 10–20)
BUN SERPL-MCNC: 58 MG/DL — HIGH (ref 10–20)
BUN SERPL-MCNC: 59 MG/DL — HIGH (ref 10–20)
BUN SERPL-MCNC: 62 MG/DL — CRITICAL HIGH (ref 10–20)
BUN SERPL-MCNC: 62 MG/DL — CRITICAL HIGH (ref 10–20)
BUN SERPL-MCNC: 63 MG/DL — CRITICAL HIGH (ref 10–20)
BUN SERPL-MCNC: 63 MG/DL — CRITICAL HIGH (ref 10–20)
BUN SERPL-MCNC: 64 MG/DL — CRITICAL HIGH (ref 10–20)
BUN SERPL-MCNC: 66 MG/DL — CRITICAL HIGH (ref 10–20)
CA-I SERPL-SCNC: 1.16 MMOL/L — SIGNIFICANT CHANGE UP (ref 1.12–1.3)
CA-I SERPL-SCNC: 1.19 MMOL/L — SIGNIFICANT CHANGE UP (ref 1.12–1.3)
CALCIUM SERPL-MCNC: 7.2 MG/DL — LOW (ref 8.5–10.1)
CALCIUM SERPL-MCNC: 7.2 MG/DL — LOW (ref 8.5–10.1)
CALCIUM SERPL-MCNC: 7.3 MG/DL — LOW (ref 8.5–10.1)
CALCIUM SERPL-MCNC: 7.4 MG/DL — LOW (ref 8.5–10.1)
CALCIUM SERPL-MCNC: 7.4 MG/DL — LOW (ref 8.5–10.1)
CALCIUM SERPL-MCNC: 7.5 MG/DL — LOW (ref 8.4–10.5)
CALCIUM SERPL-MCNC: 7.5 MG/DL — LOW (ref 8.5–10.1)
CALCIUM SERPL-MCNC: 7.6 MG/DL — LOW (ref 8.5–10.1)
CALCIUM SERPL-MCNC: 7.7 MG/DL — LOW (ref 8.5–10.1)
CALCIUM SERPL-MCNC: 7.8 MG/DL — LOW (ref 8.5–10.1)
CALCIUM SERPL-MCNC: 7.9 MG/DL — LOW (ref 8.5–10.1)
CHLORIDE SERPL-SCNC: 104 MMOL/L — SIGNIFICANT CHANGE UP (ref 98–110)
CHLORIDE SERPL-SCNC: 104 MMOL/L — SIGNIFICANT CHANGE UP (ref 98–110)
CHLORIDE SERPL-SCNC: 105 MMOL/L — SIGNIFICANT CHANGE UP (ref 98–110)
CHLORIDE SERPL-SCNC: 106 MMOL/L — SIGNIFICANT CHANGE UP (ref 98–110)
CHLORIDE SERPL-SCNC: 106 MMOL/L — SIGNIFICANT CHANGE UP (ref 98–110)
CHLORIDE SERPL-SCNC: 107 MMOL/L — SIGNIFICANT CHANGE UP (ref 98–110)
CHLORIDE SERPL-SCNC: 108 MMOL/L — SIGNIFICANT CHANGE UP (ref 98–110)
CHLORIDE SERPL-SCNC: 109 MMOL/L — SIGNIFICANT CHANGE UP (ref 98–110)
CHLORIDE SERPL-SCNC: 109 MMOL/L — SIGNIFICANT CHANGE UP (ref 98–110)
CHLORIDE SERPL-SCNC: 110 MMOL/L — SIGNIFICANT CHANGE UP (ref 98–110)
CHLORIDE SERPL-SCNC: 112 MMOL/L — HIGH (ref 98–110)
CHOLEST SERPL-MCNC: 141 MG/DL — SIGNIFICANT CHANGE UP (ref 100–200)
CK MB CFR SERPL CALC: 2.5 NG/ML — SIGNIFICANT CHANGE UP (ref 0.6–6.3)
CK SERPL-CCNC: 16 U/L — SIGNIFICANT CHANGE UP (ref 0–225)
CO2 SERPL-SCNC: 13 MMOL/L — LOW (ref 17–32)
CO2 SERPL-SCNC: 14 MMOL/L — LOW (ref 17–32)
CO2 SERPL-SCNC: 15 MMOL/L — LOW (ref 17–32)
CO2 SERPL-SCNC: 16 MMOL/L — LOW (ref 17–32)
CO2 SERPL-SCNC: 17 MMOL/L — SIGNIFICANT CHANGE UP (ref 17–32)
CO2 SERPL-SCNC: 17 MMOL/L — SIGNIFICANT CHANGE UP (ref 17–32)
CO2 SERPL-SCNC: 20 MMOL/L — SIGNIFICANT CHANGE UP (ref 17–32)
COLOR SPEC: YELLOW — SIGNIFICANT CHANGE UP
CREAT SERPL-MCNC: 3.7 MG/DL — HIGH (ref 0.7–1.5)
CREAT SERPL-MCNC: 3.7 MG/DL — HIGH (ref 0.7–1.5)
CREAT SERPL-MCNC: 3.8 MG/DL — HIGH (ref 0.7–1.5)
CREAT SERPL-MCNC: 4.1 MG/DL — CRITICAL HIGH (ref 0.7–1.5)
CREAT SERPL-MCNC: 4.3 MG/DL — CRITICAL HIGH (ref 0.7–1.5)
CREAT SERPL-MCNC: 4.3 MG/DL — CRITICAL HIGH (ref 0.7–1.5)
CREAT SERPL-MCNC: 4.4 MG/DL — CRITICAL HIGH (ref 0.7–1.5)
CREAT SERPL-MCNC: 4.4 MG/DL — CRITICAL HIGH (ref 0.7–1.5)
CREAT SERPL-MCNC: 4.5 MG/DL — CRITICAL HIGH (ref 0.7–1.5)
CREAT SERPL-MCNC: 4.5 MG/DL — CRITICAL HIGH (ref 0.7–1.5)
CREAT SERPL-MCNC: 4.6 MG/DL — CRITICAL HIGH (ref 0.7–1.5)
CRP SERPL-MCNC: 13.44 MG/DL — HIGH (ref 0–0.4)
CULTURE RESULTS: SIGNIFICANT CHANGE UP
DIFF PNL FLD: ABNORMAL
EOSINOPHIL # BLD AUTO: 0.25 K/UL — SIGNIFICANT CHANGE UP (ref 0–0.7)
EOSINOPHIL # BLD AUTO: 0.32 K/UL — SIGNIFICANT CHANGE UP (ref 0–0.7)
EOSINOPHIL # BLD AUTO: 0.32 K/UL — SIGNIFICANT CHANGE UP (ref 0–0.7)
EOSINOPHIL # BLD AUTO: 0.37 K/UL — SIGNIFICANT CHANGE UP (ref 0–0.7)
EOSINOPHIL # BLD AUTO: 0.39 K/UL — SIGNIFICANT CHANGE UP (ref 0–0.7)
EOSINOPHIL # BLD AUTO: 0.39 K/UL — SIGNIFICANT CHANGE UP (ref 0–0.7)
EOSINOPHIL # BLD AUTO: 0.41 K/UL — SIGNIFICANT CHANGE UP (ref 0–0.7)
EOSINOPHIL # BLD AUTO: 0.41 K/UL — SIGNIFICANT CHANGE UP (ref 0–0.7)
EOSINOPHIL # BLD AUTO: 0.42 K/UL — SIGNIFICANT CHANGE UP (ref 0–0.7)
EOSINOPHIL # BLD AUTO: 0.43 K/UL — SIGNIFICANT CHANGE UP (ref 0–0.7)
EOSINOPHIL # BLD AUTO: 0.43 K/UL — SIGNIFICANT CHANGE UP (ref 0–0.7)
EOSINOPHIL # BLD AUTO: 0.46 K/UL — SIGNIFICANT CHANGE UP (ref 0–0.7)
EOSINOPHIL # BLD AUTO: 0.49 K/UL — SIGNIFICANT CHANGE UP (ref 0–0.7)
EOSINOPHIL # BLD AUTO: 0.55 K/UL — SIGNIFICANT CHANGE UP (ref 0–0.7)
EOSINOPHIL # BLD AUTO: 0.68 K/UL — SIGNIFICANT CHANGE UP (ref 0–0.7)
EOSINOPHIL NFR BLD AUTO: 0.9 % — SIGNIFICANT CHANGE UP (ref 0–8)
EOSINOPHIL NFR BLD AUTO: 2.3 % — SIGNIFICANT CHANGE UP (ref 0–8)
EOSINOPHIL NFR BLD AUTO: 2.3 % — SIGNIFICANT CHANGE UP (ref 0–8)
EOSINOPHIL NFR BLD AUTO: 2.8 % — SIGNIFICANT CHANGE UP (ref 0–8)
EOSINOPHIL NFR BLD AUTO: 3.3 % — SIGNIFICANT CHANGE UP (ref 0–8)
EOSINOPHIL NFR BLD AUTO: 3.4 % — SIGNIFICANT CHANGE UP (ref 0–8)
EOSINOPHIL NFR BLD AUTO: 3.5 % — SIGNIFICANT CHANGE UP (ref 0–8)
EOSINOPHIL NFR BLD AUTO: 3.5 % — SIGNIFICANT CHANGE UP (ref 0–8)
EOSINOPHIL NFR BLD AUTO: 3.6 % — SIGNIFICANT CHANGE UP (ref 0–8)
EOSINOPHIL NFR BLD AUTO: 3.8 % — SIGNIFICANT CHANGE UP (ref 0–8)
EOSINOPHIL NFR BLD AUTO: 3.9 % — SIGNIFICANT CHANGE UP (ref 0–8)
EOSINOPHIL NFR BLD AUTO: 3.9 % — SIGNIFICANT CHANGE UP (ref 0–8)
EOSINOPHIL NFR BLD AUTO: 4 % — SIGNIFICANT CHANGE UP (ref 0–8)
EOSINOPHIL NFR BLD AUTO: 4.4 % — SIGNIFICANT CHANGE UP (ref 0–8)
EOSINOPHIL NFR BLD AUTO: 4.6 % — SIGNIFICANT CHANGE UP (ref 0–8)
EOSINOPHIL NFR BLD AUTO: 4.8 % — SIGNIFICANT CHANGE UP (ref 0–8)
EOSINOPHIL NFR BLD AUTO: 5.8 % — SIGNIFICANT CHANGE UP (ref 0–8)
EPI CELLS # UR: 0 /HPF — SIGNIFICANT CHANGE UP (ref 0–5)
ERYTHROCYTE [SEDIMENTATION RATE] IN BLOOD: 128 MM/HR — HIGH (ref 0–10)
ESTIMATED AVERAGE GLUCOSE: 91 MG/DL — SIGNIFICANT CHANGE UP (ref 68–114)
ETHANOL SERPL-MCNC: <10 MG/DL — SIGNIFICANT CHANGE UP
FOLATE RBC-MCNC: 1837 NG/ML — HIGH (ref 499–1504)
GAS PNL BLDV: 134 MMOL/L — LOW (ref 136–145)
GAS PNL BLDV: 135 MMOL/L — LOW (ref 136–145)
GAS PNL BLDV: SIGNIFICANT CHANGE UP
GAS PNL BLDV: SIGNIFICANT CHANGE UP
GIANT PLATELETS BLD QL SMEAR: PRESENT — SIGNIFICANT CHANGE UP
GLUCOSE BLDC GLUCOMTR-MCNC: 101 MG/DL — HIGH (ref 70–99)
GLUCOSE BLDC GLUCOMTR-MCNC: 101 MG/DL — HIGH (ref 70–99)
GLUCOSE BLDC GLUCOMTR-MCNC: 102 MG/DL — HIGH (ref 70–99)
GLUCOSE BLDC GLUCOMTR-MCNC: 104 MG/DL — HIGH (ref 70–99)
GLUCOSE BLDC GLUCOMTR-MCNC: 104 MG/DL — HIGH (ref 70–99)
GLUCOSE BLDC GLUCOMTR-MCNC: 105 MG/DL — HIGH (ref 70–99)
GLUCOSE BLDC GLUCOMTR-MCNC: 107 MG/DL — HIGH (ref 70–99)
GLUCOSE BLDC GLUCOMTR-MCNC: 107 MG/DL — HIGH (ref 70–99)
GLUCOSE BLDC GLUCOMTR-MCNC: 109 MG/DL — HIGH (ref 70–99)
GLUCOSE BLDC GLUCOMTR-MCNC: 112 MG/DL — HIGH (ref 70–99)
GLUCOSE BLDC GLUCOMTR-MCNC: 112 MG/DL — HIGH (ref 70–99)
GLUCOSE BLDC GLUCOMTR-MCNC: 113 MG/DL — HIGH (ref 70–99)
GLUCOSE BLDC GLUCOMTR-MCNC: 114 MG/DL — HIGH (ref 70–99)
GLUCOSE BLDC GLUCOMTR-MCNC: 115 MG/DL — HIGH (ref 70–99)
GLUCOSE BLDC GLUCOMTR-MCNC: 117 MG/DL — HIGH (ref 70–99)
GLUCOSE BLDC GLUCOMTR-MCNC: 117 MG/DL — HIGH (ref 70–99)
GLUCOSE BLDC GLUCOMTR-MCNC: 118 MG/DL — HIGH (ref 70–99)
GLUCOSE BLDC GLUCOMTR-MCNC: 119 MG/DL — HIGH (ref 70–99)
GLUCOSE BLDC GLUCOMTR-MCNC: 126 MG/DL — HIGH (ref 70–99)
GLUCOSE BLDC GLUCOMTR-MCNC: 129 MG/DL — HIGH (ref 70–99)
GLUCOSE BLDC GLUCOMTR-MCNC: 132 MG/DL — HIGH (ref 70–99)
GLUCOSE BLDC GLUCOMTR-MCNC: 144 MG/DL — HIGH (ref 70–99)
GLUCOSE BLDC GLUCOMTR-MCNC: 148 MG/DL — HIGH (ref 70–99)
GLUCOSE BLDC GLUCOMTR-MCNC: 153 MG/DL — HIGH (ref 70–99)
GLUCOSE BLDC GLUCOMTR-MCNC: 154 MG/DL — HIGH (ref 70–99)
GLUCOSE BLDC GLUCOMTR-MCNC: 162 MG/DL — HIGH (ref 70–99)
GLUCOSE BLDC GLUCOMTR-MCNC: 186 MG/DL — HIGH (ref 70–99)
GLUCOSE BLDC GLUCOMTR-MCNC: 223 MG/DL — HIGH (ref 70–99)
GLUCOSE BLDC GLUCOMTR-MCNC: 23 MG/DL — CRITICAL LOW (ref 70–99)
GLUCOSE BLDC GLUCOMTR-MCNC: 36 MG/DL — CRITICAL LOW (ref 70–99)
GLUCOSE BLDC GLUCOMTR-MCNC: 45 MG/DL — CRITICAL LOW (ref 70–99)
GLUCOSE BLDC GLUCOMTR-MCNC: 48 MG/DL — LOW (ref 70–99)
GLUCOSE BLDC GLUCOMTR-MCNC: 55 MG/DL — LOW (ref 70–99)
GLUCOSE BLDC GLUCOMTR-MCNC: 58 MG/DL — LOW (ref 70–99)
GLUCOSE BLDC GLUCOMTR-MCNC: 59 MG/DL — LOW (ref 70–99)
GLUCOSE BLDC GLUCOMTR-MCNC: 63 MG/DL — LOW (ref 70–99)
GLUCOSE BLDC GLUCOMTR-MCNC: 64 MG/DL — LOW (ref 70–99)
GLUCOSE BLDC GLUCOMTR-MCNC: 64 MG/DL — LOW (ref 70–99)
GLUCOSE BLDC GLUCOMTR-MCNC: 66 MG/DL — LOW (ref 70–99)
GLUCOSE BLDC GLUCOMTR-MCNC: 67 MG/DL — LOW (ref 70–99)
GLUCOSE BLDC GLUCOMTR-MCNC: 68 MG/DL — LOW (ref 70–99)
GLUCOSE BLDC GLUCOMTR-MCNC: 70 MG/DL — SIGNIFICANT CHANGE UP (ref 70–99)
GLUCOSE BLDC GLUCOMTR-MCNC: 71 MG/DL — SIGNIFICANT CHANGE UP (ref 70–99)
GLUCOSE BLDC GLUCOMTR-MCNC: 71 MG/DL — SIGNIFICANT CHANGE UP (ref 70–99)
GLUCOSE BLDC GLUCOMTR-MCNC: 72 MG/DL — SIGNIFICANT CHANGE UP (ref 70–99)
GLUCOSE BLDC GLUCOMTR-MCNC: 73 MG/DL — SIGNIFICANT CHANGE UP (ref 70–99)
GLUCOSE BLDC GLUCOMTR-MCNC: 74 MG/DL — SIGNIFICANT CHANGE UP (ref 70–99)
GLUCOSE BLDC GLUCOMTR-MCNC: 75 MG/DL — SIGNIFICANT CHANGE UP (ref 70–99)
GLUCOSE BLDC GLUCOMTR-MCNC: 76 MG/DL — SIGNIFICANT CHANGE UP (ref 70–99)
GLUCOSE BLDC GLUCOMTR-MCNC: 76 MG/DL — SIGNIFICANT CHANGE UP (ref 70–99)
GLUCOSE BLDC GLUCOMTR-MCNC: 77 MG/DL — SIGNIFICANT CHANGE UP (ref 70–99)
GLUCOSE BLDC GLUCOMTR-MCNC: 77 MG/DL — SIGNIFICANT CHANGE UP (ref 70–99)
GLUCOSE BLDC GLUCOMTR-MCNC: 79 MG/DL — SIGNIFICANT CHANGE UP (ref 70–99)
GLUCOSE BLDC GLUCOMTR-MCNC: 81 MG/DL — SIGNIFICANT CHANGE UP (ref 70–99)
GLUCOSE BLDC GLUCOMTR-MCNC: 83 MG/DL — SIGNIFICANT CHANGE UP (ref 70–99)
GLUCOSE BLDC GLUCOMTR-MCNC: 83 MG/DL — SIGNIFICANT CHANGE UP (ref 70–99)
GLUCOSE BLDC GLUCOMTR-MCNC: 85 MG/DL — SIGNIFICANT CHANGE UP (ref 70–99)
GLUCOSE BLDC GLUCOMTR-MCNC: 86 MG/DL — SIGNIFICANT CHANGE UP (ref 70–99)
GLUCOSE BLDC GLUCOMTR-MCNC: 87 MG/DL — SIGNIFICANT CHANGE UP (ref 70–99)
GLUCOSE BLDC GLUCOMTR-MCNC: 87 MG/DL — SIGNIFICANT CHANGE UP (ref 70–99)
GLUCOSE BLDC GLUCOMTR-MCNC: 90 MG/DL — SIGNIFICANT CHANGE UP (ref 70–99)
GLUCOSE BLDC GLUCOMTR-MCNC: 91 MG/DL — SIGNIFICANT CHANGE UP (ref 70–99)
GLUCOSE BLDC GLUCOMTR-MCNC: 92 MG/DL — SIGNIFICANT CHANGE UP (ref 70–99)
GLUCOSE BLDC GLUCOMTR-MCNC: 92 MG/DL — SIGNIFICANT CHANGE UP (ref 70–99)
GLUCOSE BLDC GLUCOMTR-MCNC: 94 MG/DL — SIGNIFICANT CHANGE UP (ref 70–99)
GLUCOSE BLDC GLUCOMTR-MCNC: 95 MG/DL — SIGNIFICANT CHANGE UP (ref 70–99)
GLUCOSE BLDC GLUCOMTR-MCNC: 95 MG/DL — SIGNIFICANT CHANGE UP (ref 70–99)
GLUCOSE BLDC GLUCOMTR-MCNC: 97 MG/DL — SIGNIFICANT CHANGE UP (ref 70–99)
GLUCOSE BLDC GLUCOMTR-MCNC: 97 MG/DL — SIGNIFICANT CHANGE UP (ref 70–99)
GLUCOSE BLDC GLUCOMTR-MCNC: 99 MG/DL — SIGNIFICANT CHANGE UP (ref 70–99)
GLUCOSE BLDC GLUCOMTR-MCNC: 99 MG/DL — SIGNIFICANT CHANGE UP (ref 70–99)
GLUCOSE SERPL-MCNC: 100 MG/DL — HIGH (ref 70–99)
GLUCOSE SERPL-MCNC: 107 MG/DL — HIGH (ref 70–99)
GLUCOSE SERPL-MCNC: 112 MG/DL — HIGH (ref 70–99)
GLUCOSE SERPL-MCNC: 50 MG/DL — LOW (ref 70–99)
GLUCOSE SERPL-MCNC: 52 MG/DL — LOW (ref 70–99)
GLUCOSE SERPL-MCNC: 53 MG/DL — LOW (ref 70–99)
GLUCOSE SERPL-MCNC: 56 MG/DL — LOW (ref 70–99)
GLUCOSE SERPL-MCNC: 62 MG/DL — LOW (ref 70–99)
GLUCOSE SERPL-MCNC: 66 MG/DL — LOW (ref 70–99)
GLUCOSE SERPL-MCNC: 68 MG/DL — LOW (ref 70–99)
GLUCOSE SERPL-MCNC: 73 MG/DL — SIGNIFICANT CHANGE UP (ref 70–99)
GLUCOSE SERPL-MCNC: 75 MG/DL — SIGNIFICANT CHANGE UP (ref 70–99)
GLUCOSE SERPL-MCNC: 77 MG/DL — SIGNIFICANT CHANGE UP (ref 70–99)
GLUCOSE SERPL-MCNC: 80 MG/DL — SIGNIFICANT CHANGE UP (ref 70–99)
GLUCOSE SERPL-MCNC: 86 MG/DL — SIGNIFICANT CHANGE UP (ref 70–99)
GLUCOSE SERPL-MCNC: 92 MG/DL — SIGNIFICANT CHANGE UP (ref 70–99)
GLUCOSE SERPL-MCNC: 93 MG/DL — SIGNIFICANT CHANGE UP (ref 70–99)
GLUCOSE SERPL-MCNC: 94 MG/DL — SIGNIFICANT CHANGE UP (ref 70–99)
GLUCOSE SERPL-MCNC: 94 MG/DL — SIGNIFICANT CHANGE UP (ref 70–99)
GLUCOSE SERPL-MCNC: 99 MG/DL — SIGNIFICANT CHANGE UP (ref 70–99)
GLUCOSE UR QL: NEGATIVE — SIGNIFICANT CHANGE UP
GRAM STN FLD: SIGNIFICANT CHANGE UP
HBA1C BLD-MCNC: 4.8 % — SIGNIFICANT CHANGE UP (ref 4–5.6)
HCO3 BLDA-SCNC: 16 MMOL/L — LOW (ref 23–27)
HCO3 BLDV-SCNC: 17 MMOL/L — LOW (ref 22–29)
HCO3 BLDV-SCNC: 18 MMOL/L — LOW (ref 22–29)
HCT VFR BLD CALC: 21 % — LOW (ref 42–52)
HCT VFR BLD CALC: 22.8 % — LOW (ref 42–52)
HCT VFR BLD CALC: 22.8 % — LOW (ref 42–52)
HCT VFR BLD CALC: 23.3 % — LOW (ref 39–50)
HCT VFR BLD CALC: 23.4 % — LOW (ref 42–52)
HCT VFR BLD CALC: 23.5 % — LOW (ref 42–52)
HCT VFR BLD CALC: 23.9 % — LOW (ref 42–52)
HCT VFR BLD CALC: 24 % — LOW (ref 42–52)
HCT VFR BLD CALC: 24.3 % — LOW (ref 42–52)
HCT VFR BLD CALC: 25.6 % — LOW (ref 42–52)
HCT VFR BLD CALC: 25.6 % — LOW (ref 42–52)
HCT VFR BLD CALC: 25.9 % — LOW (ref 42–52)
HCT VFR BLD CALC: 26.7 % — LOW (ref 42–52)
HCT VFR BLD CALC: 29.6 % — LOW (ref 42–52)
HCT VFR BLD CALC: 30.4 % — LOW (ref 42–52)
HCT VFR BLD CALC: 30.6 % — LOW (ref 42–52)
HCT VFR BLD CALC: 30.7 % — LOW (ref 42–52)
HCT VFR BLD CALC: 30.7 % — LOW (ref 42–52)
HCT VFR BLD CALC: 30.9 % — LOW (ref 42–52)
HCT VFR BLDA CALC: 26.8 % — LOW (ref 34–44)
HCT VFR BLDA CALC: 36.5 % — SIGNIFICANT CHANGE UP (ref 34–44)
HDLC SERPL-MCNC: 46 MG/DL — SIGNIFICANT CHANGE UP
HGB BLD CALC-MCNC: 11.9 G/DL — LOW (ref 14–18)
HGB BLD CALC-MCNC: 8.7 G/DL — LOW (ref 14–18)
HGB BLD-MCNC: 6.7 G/DL — CRITICAL LOW (ref 14–18)
HGB BLD-MCNC: 7.1 G/DL — LOW (ref 14–18)
HGB BLD-MCNC: 7.2 G/DL — LOW (ref 14–18)
HGB BLD-MCNC: 7.5 G/DL — LOW (ref 14–18)
HGB BLD-MCNC: 7.5 G/DL — LOW (ref 14–18)
HGB BLD-MCNC: 7.6 G/DL — LOW (ref 14–18)
HGB BLD-MCNC: 7.7 G/DL — LOW (ref 14–18)
HGB BLD-MCNC: 7.8 G/DL — LOW (ref 14–18)
HGB BLD-MCNC: 8 G/DL — LOW (ref 14–18)
HGB BLD-MCNC: 8.1 G/DL — LOW (ref 14–18)
HGB BLD-MCNC: 8.3 G/DL — LOW (ref 14–18)
HGB BLD-MCNC: 8.7 G/DL — LOW (ref 14–18)
HGB BLD-MCNC: 9.2 G/DL — LOW (ref 14–18)
HGB BLD-MCNC: 9.2 G/DL — LOW (ref 14–18)
HGB BLD-MCNC: 9.4 G/DL — LOW (ref 14–18)
HGB BLD-MCNC: 9.4 G/DL — LOW (ref 14–18)
HGB BLD-MCNC: 9.6 G/DL — LOW (ref 14–18)
HGB BLD-MCNC: 9.8 G/DL — LOW (ref 14–18)
HYALINE CASTS # UR AUTO: 5 /LPF — SIGNIFICANT CHANGE UP (ref 0–7)
IMM GRANULOCYTES NFR BLD AUTO: 0.3 % — SIGNIFICANT CHANGE UP (ref 0.1–0.3)
IMM GRANULOCYTES NFR BLD AUTO: 0.4 % — HIGH (ref 0.1–0.3)
IMM GRANULOCYTES NFR BLD AUTO: 0.5 % — HIGH (ref 0.1–0.3)
IMM GRANULOCYTES NFR BLD AUTO: 0.6 % — HIGH (ref 0.1–0.3)
IMM GRANULOCYTES NFR BLD AUTO: 0.7 % — HIGH (ref 0.1–0.3)
IMM GRANULOCYTES NFR BLD AUTO: 0.8 % — HIGH (ref 0.1–0.3)
KETONES UR-MCNC: SIGNIFICANT CHANGE UP
LACTATE BLDV-MCNC: 0.9 MMOL/L — SIGNIFICANT CHANGE UP (ref 0.5–1.6)
LACTATE BLDV-MCNC: 1 MMOL/L — SIGNIFICANT CHANGE UP (ref 0.5–1.6)
LACTATE SERPL-SCNC: 1.4 MMOL/L — SIGNIFICANT CHANGE UP (ref 0.7–2)
LEUKOCYTE ESTERASE UR-ACNC: ABNORMAL
LIPID PNL WITH DIRECT LDL SERPL: 75 MG/DL — SIGNIFICANT CHANGE UP (ref 4–129)
LYMPHOCYTES # BLD AUTO: 1.09 K/UL — LOW (ref 1.2–3.4)
LYMPHOCYTES # BLD AUTO: 1.19 K/UL — LOW (ref 1.2–3.4)
LYMPHOCYTES # BLD AUTO: 1.72 K/UL — SIGNIFICANT CHANGE UP (ref 1.2–3.4)
LYMPHOCYTES # BLD AUTO: 1.75 K/UL — SIGNIFICANT CHANGE UP (ref 1.2–3.4)
LYMPHOCYTES # BLD AUTO: 1.76 K/UL — SIGNIFICANT CHANGE UP (ref 1.2–3.4)
LYMPHOCYTES # BLD AUTO: 1.92 K/UL — SIGNIFICANT CHANGE UP (ref 1.2–3.4)
LYMPHOCYTES # BLD AUTO: 14.1 % — LOW (ref 20.5–51.1)
LYMPHOCYTES # BLD AUTO: 15.7 % — LOW (ref 20.5–51.1)
LYMPHOCYTES # BLD AUTO: 15.9 % — LOW (ref 20.5–51.1)
LYMPHOCYTES # BLD AUTO: 16.3 % — LOW (ref 20.5–51.1)
LYMPHOCYTES # BLD AUTO: 16.3 % — LOW (ref 20.5–51.1)
LYMPHOCYTES # BLD AUTO: 17.8 % — LOW (ref 20.5–51.1)
LYMPHOCYTES # BLD AUTO: 17.8 % — LOW (ref 20.5–51.1)
LYMPHOCYTES # BLD AUTO: 2 K/UL — SIGNIFICANT CHANGE UP (ref 1.2–3.4)
LYMPHOCYTES # BLD AUTO: 2.04 K/UL — SIGNIFICANT CHANGE UP (ref 1.2–3.4)
LYMPHOCYTES # BLD AUTO: 2.31 K/UL — SIGNIFICANT CHANGE UP (ref 1.2–3.4)
LYMPHOCYTES # BLD AUTO: 2.44 K/UL — SIGNIFICANT CHANGE UP (ref 1.2–3.4)
LYMPHOCYTES # BLD AUTO: 2.63 K/UL — SIGNIFICANT CHANGE UP (ref 1.2–3.4)
LYMPHOCYTES # BLD AUTO: 2.87 K/UL — SIGNIFICANT CHANGE UP (ref 1.2–3.4)
LYMPHOCYTES # BLD AUTO: 2.96 K/UL — SIGNIFICANT CHANGE UP (ref 1.2–3.4)
LYMPHOCYTES # BLD AUTO: 2.99 K/UL — SIGNIFICANT CHANGE UP (ref 1.2–3.4)
LYMPHOCYTES # BLD AUTO: 21.2 % — SIGNIFICANT CHANGE UP (ref 20.5–51.1)
LYMPHOCYTES # BLD AUTO: 22.1 % — SIGNIFICANT CHANGE UP (ref 20.5–51.1)
LYMPHOCYTES # BLD AUTO: 27.7 % — SIGNIFICANT CHANGE UP (ref 20.5–51.1)
LYMPHOCYTES # BLD AUTO: 3.56 K/UL — HIGH (ref 1.2–3.4)
LYMPHOCYTES # BLD AUTO: 3.8 K/UL — HIGH (ref 1.2–3.4)
LYMPHOCYTES # BLD AUTO: 3.93 K/UL — HIGH (ref 1.2–3.4)
LYMPHOCYTES # BLD AUTO: 33.2 % — SIGNIFICANT CHANGE UP (ref 20.5–51.1)
LYMPHOCYTES # BLD AUTO: 34.2 % — SIGNIFICANT CHANGE UP (ref 20.5–51.1)
LYMPHOCYTES # BLD AUTO: 35 % — SIGNIFICANT CHANGE UP (ref 20.5–51.1)
LYMPHOCYTES # BLD AUTO: 37.8 % — SIGNIFICANT CHANGE UP (ref 20.5–51.1)
LYMPHOCYTES # BLD AUTO: 8.3 % — LOW (ref 20.5–51.1)
LYMPHOCYTES # BLD AUTO: 9.6 % — LOW (ref 20.5–51.1)
LYMPHOCYTES # BLD AUTO: 9.6 % — LOW (ref 20.5–51.1)
MAGNESIUM SERPL-MCNC: 1.4 MG/DL — LOW (ref 1.8–2.4)
MAGNESIUM SERPL-MCNC: 1.4 MG/DL — LOW (ref 1.8–2.4)
MAGNESIUM SERPL-MCNC: 1.6 MG/DL — LOW (ref 1.8–2.4)
MAGNESIUM SERPL-MCNC: 1.7 MG/DL — LOW (ref 1.8–2.4)
MAGNESIUM SERPL-MCNC: 1.8 MG/DL — SIGNIFICANT CHANGE UP (ref 1.8–2.4)
MAGNESIUM SERPL-MCNC: 1.8 MG/DL — SIGNIFICANT CHANGE UP (ref 1.8–2.4)
MAGNESIUM SERPL-MCNC: 1.9 MG/DL — SIGNIFICANT CHANGE UP (ref 1.8–2.4)
MAGNESIUM SERPL-MCNC: 2 MG/DL — SIGNIFICANT CHANGE UP (ref 1.8–2.4)
MAGNESIUM SERPL-MCNC: 2.1 MG/DL — SIGNIFICANT CHANGE UP (ref 1.8–2.4)
MANUAL SMEAR VERIFICATION: SIGNIFICANT CHANGE UP
MCHC RBC-ENTMCNC: 27.8 PG — SIGNIFICANT CHANGE UP (ref 27–31)
MCHC RBC-ENTMCNC: 28 PG — SIGNIFICANT CHANGE UP (ref 27–31)
MCHC RBC-ENTMCNC: 28.1 PG — SIGNIFICANT CHANGE UP (ref 27–31)
MCHC RBC-ENTMCNC: 28.1 PG — SIGNIFICANT CHANGE UP (ref 27–31)
MCHC RBC-ENTMCNC: 28.4 PG — SIGNIFICANT CHANGE UP (ref 27–31)
MCHC RBC-ENTMCNC: 28.5 PG — SIGNIFICANT CHANGE UP (ref 27–31)
MCHC RBC-ENTMCNC: 28.6 PG — SIGNIFICANT CHANGE UP (ref 27–31)
MCHC RBC-ENTMCNC: 28.6 PG — SIGNIFICANT CHANGE UP (ref 27–31)
MCHC RBC-ENTMCNC: 28.7 PG — SIGNIFICANT CHANGE UP (ref 27–31)
MCHC RBC-ENTMCNC: 28.8 PG — SIGNIFICANT CHANGE UP (ref 27–31)
MCHC RBC-ENTMCNC: 28.8 PG — SIGNIFICANT CHANGE UP (ref 27–31)
MCHC RBC-ENTMCNC: 29 PG — SIGNIFICANT CHANGE UP (ref 27–31)
MCHC RBC-ENTMCNC: 29.2 PG — SIGNIFICANT CHANGE UP (ref 27–31)
MCHC RBC-ENTMCNC: 29.2 PG — SIGNIFICANT CHANGE UP (ref 27–31)
MCHC RBC-ENTMCNC: 29.4 PG — SIGNIFICANT CHANGE UP (ref 27–31)
MCHC RBC-ENTMCNC: 29.5 PG — SIGNIFICANT CHANGE UP (ref 27–31)
MCHC RBC-ENTMCNC: 29.5 PG — SIGNIFICANT CHANGE UP (ref 27–31)
MCHC RBC-ENTMCNC: 29.6 PG — SIGNIFICANT CHANGE UP (ref 27–31)
MCHC RBC-ENTMCNC: 30.3 G/DL — LOW (ref 32–37)
MCHC RBC-ENTMCNC: 30.6 G/DL — LOW (ref 32–37)
MCHC RBC-ENTMCNC: 30.7 G/DL — LOW (ref 32–37)
MCHC RBC-ENTMCNC: 31.1 G/DL — LOW (ref 32–37)
MCHC RBC-ENTMCNC: 31.1 G/DL — LOW (ref 32–37)
MCHC RBC-ENTMCNC: 31.3 G/DL — LOW (ref 32–37)
MCHC RBC-ENTMCNC: 31.3 G/DL — LOW (ref 32–37)
MCHC RBC-ENTMCNC: 31.4 G/DL — LOW (ref 32–37)
MCHC RBC-ENTMCNC: 31.6 G/DL — LOW (ref 32–37)
MCHC RBC-ENTMCNC: 31.6 G/DL — LOW (ref 32–37)
MCHC RBC-ENTMCNC: 31.7 G/DL — LOW (ref 32–37)
MCHC RBC-ENTMCNC: 31.9 G/DL — LOW (ref 32–37)
MCHC RBC-ENTMCNC: 32 G/DL — SIGNIFICANT CHANGE UP (ref 32–37)
MCHC RBC-ENTMCNC: 32.1 G/DL — SIGNIFICANT CHANGE UP (ref 32–37)
MCHC RBC-ENTMCNC: 32.3 G/DL — SIGNIFICANT CHANGE UP (ref 32–37)
MCHC RBC-ENTMCNC: 32.6 G/DL — SIGNIFICANT CHANGE UP (ref 32–37)
MCV RBC AUTO: 88.4 FL — SIGNIFICANT CHANGE UP (ref 80–94)
MCV RBC AUTO: 89 FL — SIGNIFICANT CHANGE UP (ref 80–94)
MCV RBC AUTO: 90.1 FL — SIGNIFICANT CHANGE UP (ref 80–94)
MCV RBC AUTO: 90.5 FL — SIGNIFICANT CHANGE UP (ref 80–94)
MCV RBC AUTO: 90.5 FL — SIGNIFICANT CHANGE UP (ref 80–94)
MCV RBC AUTO: 90.8 FL — SIGNIFICANT CHANGE UP (ref 80–94)
MCV RBC AUTO: 90.9 FL — SIGNIFICANT CHANGE UP (ref 80–94)
MCV RBC AUTO: 91.1 FL — SIGNIFICANT CHANGE UP (ref 80–94)
MCV RBC AUTO: 91.1 FL — SIGNIFICANT CHANGE UP (ref 80–94)
MCV RBC AUTO: 91.6 FL — SIGNIFICANT CHANGE UP (ref 80–94)
MCV RBC AUTO: 91.6 FL — SIGNIFICANT CHANGE UP (ref 80–94)
MCV RBC AUTO: 91.8 FL — SIGNIFICANT CHANGE UP (ref 80–94)
MCV RBC AUTO: 92 FL — SIGNIFICANT CHANGE UP (ref 80–94)
MCV RBC AUTO: 92.3 FL — SIGNIFICANT CHANGE UP (ref 80–94)
MCV RBC AUTO: 93 FL — SIGNIFICANT CHANGE UP (ref 80–94)
MCV RBC AUTO: 93.1 FL — SIGNIFICANT CHANGE UP (ref 80–94)
METHOD TYPE: SIGNIFICANT CHANGE UP
METHOD TYPE: SIGNIFICANT CHANGE UP
MICROCYTES BLD QL: SLIGHT — SIGNIFICANT CHANGE UP
MONOCYTES # BLD AUTO: 0.51 K/UL — SIGNIFICANT CHANGE UP (ref 0.1–0.6)
MONOCYTES # BLD AUTO: 0.56 K/UL — SIGNIFICANT CHANGE UP (ref 0.1–0.6)
MONOCYTES # BLD AUTO: 0.56 K/UL — SIGNIFICANT CHANGE UP (ref 0.1–0.6)
MONOCYTES # BLD AUTO: 0.57 K/UL — SIGNIFICANT CHANGE UP (ref 0.1–0.6)
MONOCYTES # BLD AUTO: 0.58 K/UL — SIGNIFICANT CHANGE UP (ref 0.1–0.6)
MONOCYTES # BLD AUTO: 0.6 K/UL — SIGNIFICANT CHANGE UP (ref 0.1–0.6)
MONOCYTES # BLD AUTO: 0.61 K/UL — HIGH (ref 0.1–0.6)
MONOCYTES # BLD AUTO: 0.64 K/UL — HIGH (ref 0.1–0.6)
MONOCYTES # BLD AUTO: 0.64 K/UL — HIGH (ref 0.1–0.6)
MONOCYTES # BLD AUTO: 0.65 K/UL — HIGH (ref 0.1–0.6)
MONOCYTES # BLD AUTO: 0.67 K/UL — HIGH (ref 0.1–0.6)
MONOCYTES # BLD AUTO: 0.68 K/UL — HIGH (ref 0.1–0.6)
MONOCYTES # BLD AUTO: 0.7 K/UL — HIGH (ref 0.1–0.6)
MONOCYTES # BLD AUTO: 0.71 K/UL — HIGH (ref 0.1–0.6)
MONOCYTES # BLD AUTO: 0.74 K/UL — HIGH (ref 0.1–0.6)
MONOCYTES # BLD AUTO: 0.77 K/UL — HIGH (ref 0.1–0.6)
MONOCYTES # BLD AUTO: 0.96 K/UL — HIGH (ref 0.1–0.6)
MONOCYTES NFR BLD AUTO: 3.5 % — SIGNIFICANT CHANGE UP (ref 1.7–9.3)
MONOCYTES NFR BLD AUTO: 3.7 % — SIGNIFICANT CHANGE UP (ref 1.7–9.3)
MONOCYTES NFR BLD AUTO: 4.1 % — SIGNIFICANT CHANGE UP (ref 1.7–9.3)
MONOCYTES NFR BLD AUTO: 4.2 % — SIGNIFICANT CHANGE UP (ref 1.7–9.3)
MONOCYTES NFR BLD AUTO: 4.6 % — SIGNIFICANT CHANGE UP (ref 1.7–9.3)
MONOCYTES NFR BLD AUTO: 4.8 % — SIGNIFICANT CHANGE UP (ref 1.7–9.3)
MONOCYTES NFR BLD AUTO: 4.9 % — SIGNIFICANT CHANGE UP (ref 1.7–9.3)
MONOCYTES NFR BLD AUTO: 4.9 % — SIGNIFICANT CHANGE UP (ref 1.7–9.3)
MONOCYTES NFR BLD AUTO: 5 % — SIGNIFICANT CHANGE UP (ref 1.7–9.3)
MONOCYTES NFR BLD AUTO: 5.4 % — SIGNIFICANT CHANGE UP (ref 1.7–9.3)
MONOCYTES NFR BLD AUTO: 5.8 % — SIGNIFICANT CHANGE UP (ref 1.7–9.3)
MONOCYTES NFR BLD AUTO: 6.3 % — SIGNIFICANT CHANGE UP (ref 1.7–9.3)
MONOCYTES NFR BLD AUTO: 6.4 % — SIGNIFICANT CHANGE UP (ref 1.7–9.3)
MONOCYTES NFR BLD AUTO: 6.9 % — SIGNIFICANT CHANGE UP (ref 1.7–9.3)
MONOCYTES NFR BLD AUTO: 7.5 % — SIGNIFICANT CHANGE UP (ref 1.7–9.3)
MONOCYTES NFR BLD AUTO: 7.8 % — SIGNIFICANT CHANGE UP (ref 1.7–9.3)
MONOCYTES NFR BLD AUTO: 8.5 % — SIGNIFICANT CHANGE UP (ref 1.7–9.3)
MRSA SPEC QL CULT: SIGNIFICANT CHANGE UP
NEUTROPHILS # BLD AUTO: 10.47 K/UL — HIGH (ref 1.4–6.5)
NEUTROPHILS # BLD AUTO: 10.99 K/UL — HIGH (ref 1.4–6.5)
NEUTROPHILS # BLD AUTO: 13.72 K/UL — HIGH (ref 1.4–6.5)
NEUTROPHILS # BLD AUTO: 15.31 K/UL — HIGH (ref 1.4–6.5)
NEUTROPHILS # BLD AUTO: 23.52 K/UL — HIGH (ref 1.4–6.5)
NEUTROPHILS # BLD AUTO: 4.5 K/UL — SIGNIFICANT CHANGE UP (ref 1.4–6.5)
NEUTROPHILS # BLD AUTO: 4.6 K/UL — SIGNIFICANT CHANGE UP (ref 1.4–6.5)
NEUTROPHILS # BLD AUTO: 4.78 K/UL — SIGNIFICANT CHANGE UP (ref 1.4–6.5)
NEUTROPHILS # BLD AUTO: 6.04 K/UL — SIGNIFICANT CHANGE UP (ref 1.4–6.5)
NEUTROPHILS # BLD AUTO: 6.18 K/UL — SIGNIFICANT CHANGE UP (ref 1.4–6.5)
NEUTROPHILS # BLD AUTO: 6.19 K/UL — SIGNIFICANT CHANGE UP (ref 1.4–6.5)
NEUTROPHILS # BLD AUTO: 6.44 K/UL — SIGNIFICANT CHANGE UP (ref 1.4–6.5)
NEUTROPHILS # BLD AUTO: 7.33 K/UL — HIGH (ref 1.4–6.5)
NEUTROPHILS # BLD AUTO: 8.26 K/UL — HIGH (ref 1.4–6.5)
NEUTROPHILS # BLD AUTO: 8.38 K/UL — HIGH (ref 1.4–6.5)
NEUTROPHILS # BLD AUTO: 8.42 K/UL — HIGH (ref 1.4–6.5)
NEUTROPHILS # BLD AUTO: 9.1 K/UL — HIGH (ref 1.4–6.5)
NEUTROPHILS NFR BLD AUTO: 48.8 % — SIGNIFICANT CHANGE UP (ref 42.2–75.2)
NEUTROPHILS NFR BLD AUTO: 53 % — SIGNIFICANT CHANGE UP (ref 42.2–75.2)
NEUTROPHILS NFR BLD AUTO: 53.9 % — SIGNIFICANT CHANGE UP (ref 42.2–75.2)
NEUTROPHILS NFR BLD AUTO: 55.8 % — SIGNIFICANT CHANGE UP (ref 42.2–75.2)
NEUTROPHILS NFR BLD AUTO: 60.4 % — SIGNIFICANT CHANGE UP (ref 42.2–75.2)
NEUTROPHILS NFR BLD AUTO: 65.6 % — SIGNIFICANT CHANGE UP (ref 42.2–75.2)
NEUTROPHILS NFR BLD AUTO: 66.5 % — SIGNIFICANT CHANGE UP (ref 42.2–75.2)
NEUTROPHILS NFR BLD AUTO: 67.6 % — SIGNIFICANT CHANGE UP (ref 42.2–75.2)
NEUTROPHILS NFR BLD AUTO: 71.6 % — SIGNIFICANT CHANGE UP (ref 42.2–75.2)
NEUTROPHILS NFR BLD AUTO: 72.3 % — SIGNIFICANT CHANGE UP (ref 42.2–75.2)
NEUTROPHILS NFR BLD AUTO: 73.9 % — SIGNIFICANT CHANGE UP (ref 42.2–75.2)
NEUTROPHILS NFR BLD AUTO: 74.3 % — SIGNIFICANT CHANGE UP (ref 42.2–75.2)
NEUTROPHILS NFR BLD AUTO: 75.4 % — HIGH (ref 42.2–75.2)
NEUTROPHILS NFR BLD AUTO: 76.1 % — HIGH (ref 42.2–75.2)
NEUTROPHILS NFR BLD AUTO: 81.7 % — HIGH (ref 42.2–75.2)
NEUTROPHILS NFR BLD AUTO: 83.2 % — HIGH (ref 42.2–75.2)
NEUTROPHILS NFR BLD AUTO: 83.5 % — HIGH (ref 42.2–75.2)
NEUTS BAND # BLD: 4.3 % — SIGNIFICANT CHANGE UP (ref 0–6)
NITRITE UR-MCNC: NEGATIVE — SIGNIFICANT CHANGE UP
NRBC # BLD: 0 /100 WBCS — SIGNIFICANT CHANGE UP (ref 0–0)
ORGANISM # SPEC MICROSCOPIC CNT: SIGNIFICANT CHANGE UP
PCO2 BLDA: 37 MMHG — LOW (ref 38–42)
PCO2 BLDV: 43 MMHG — SIGNIFICANT CHANGE UP (ref 41–51)
PCO2 BLDV: 50 MMHG — SIGNIFICANT CHANGE UP (ref 41–51)
PH BLDA: 7.24 — LOW (ref 7.38–7.42)
PH BLDV: 7.16 — LOW (ref 7.26–7.43)
PH BLDV: 7.2 — LOW (ref 7.26–7.43)
PH UR: 6.5 — SIGNIFICANT CHANGE UP (ref 5–8)
PHOSPHATE SERPL-MCNC: 6.6 MG/DL — HIGH (ref 2.1–4.9)
PHOSPHATE SERPL-MCNC: 7.1 MG/DL — HIGH (ref 2.1–4.9)
PHOSPHATE SERPL-MCNC: 7.5 MG/DL — HIGH (ref 2.1–4.9)
PHOSPHATE SERPL-MCNC: 7.7 MG/DL — HIGH (ref 2.1–4.9)
PHOSPHATE SERPL-MCNC: 7.9 MG/DL — HIGH (ref 2.1–4.9)
PHOSPHATE SERPL-MCNC: 7.9 MG/DL — HIGH (ref 2.1–4.9)
PHOSPHATE SERPL-MCNC: 8 MG/DL — HIGH (ref 2.1–4.9)
PHOSPHATE SERPL-MCNC: 8.1 MG/DL — HIGH (ref 2.1–4.9)
PHOSPHATE SERPL-MCNC: 8.3 MG/DL — HIGH (ref 2.1–4.9)
PHOSPHATE SERPL-MCNC: 8.4 MG/DL — HIGH (ref 2.1–4.9)
PHOSPHATE SERPL-MCNC: 8.6 MG/DL — HIGH (ref 2.1–4.9)
PLAT MORPH BLD: NORMAL — SIGNIFICANT CHANGE UP
PLATELET # BLD AUTO: 195 K/UL — SIGNIFICANT CHANGE UP (ref 130–400)
PLATELET # BLD AUTO: 228 K/UL — SIGNIFICANT CHANGE UP (ref 130–400)
PLATELET # BLD AUTO: 229 K/UL — SIGNIFICANT CHANGE UP (ref 130–400)
PLATELET # BLD AUTO: 231 K/UL — SIGNIFICANT CHANGE UP (ref 130–400)
PLATELET # BLD AUTO: 238 K/UL — SIGNIFICANT CHANGE UP (ref 130–400)
PLATELET # BLD AUTO: 246 K/UL — SIGNIFICANT CHANGE UP (ref 130–400)
PLATELET # BLD AUTO: 253 K/UL — SIGNIFICANT CHANGE UP (ref 130–400)
PLATELET # BLD AUTO: 274 K/UL — SIGNIFICANT CHANGE UP (ref 130–400)
PLATELET # BLD AUTO: 278 K/UL — SIGNIFICANT CHANGE UP (ref 130–400)
PLATELET # BLD AUTO: 283 K/UL — SIGNIFICANT CHANGE UP (ref 130–400)
PLATELET # BLD AUTO: 284 K/UL — SIGNIFICANT CHANGE UP (ref 130–400)
PLATELET # BLD AUTO: 287 K/UL — SIGNIFICANT CHANGE UP (ref 130–400)
PLATELET # BLD AUTO: 289 K/UL — SIGNIFICANT CHANGE UP (ref 130–400)
PLATELET # BLD AUTO: 291 K/UL — SIGNIFICANT CHANGE UP (ref 130–400)
PLATELET # BLD AUTO: 291 K/UL — SIGNIFICANT CHANGE UP (ref 130–400)
PLATELET # BLD AUTO: 309 K/UL — SIGNIFICANT CHANGE UP (ref 130–400)
PLATELET # BLD AUTO: 335 K/UL — SIGNIFICANT CHANGE UP (ref 130–400)
PLATELET # BLD AUTO: 336 K/UL — SIGNIFICANT CHANGE UP (ref 130–400)
PO2 BLDA: 48 MMHG — LOW (ref 78–95)
PO2 BLDV: 19 MMHG — LOW (ref 20–40)
PO2 BLDV: 22 MMHG — SIGNIFICANT CHANGE UP (ref 20–40)
POIKILOCYTOSIS BLD QL AUTO: SIGNIFICANT CHANGE UP
POLYCHROMASIA BLD QL SMEAR: SIGNIFICANT CHANGE UP
POTASSIUM BLDV-SCNC: 4.5 MMOL/L — SIGNIFICANT CHANGE UP (ref 3.3–5.6)
POTASSIUM BLDV-SCNC: 5.2 MMOL/L — SIGNIFICANT CHANGE UP (ref 3.3–5.6)
POTASSIUM SERPL-MCNC: 4.2 MMOL/L — SIGNIFICANT CHANGE UP (ref 3.5–5)
POTASSIUM SERPL-MCNC: 4.4 MMOL/L — SIGNIFICANT CHANGE UP (ref 3.5–5)
POTASSIUM SERPL-MCNC: 4.5 MMOL/L — SIGNIFICANT CHANGE UP (ref 3.5–5)
POTASSIUM SERPL-MCNC: 4.8 MMOL/L — SIGNIFICANT CHANGE UP (ref 3.5–5)
POTASSIUM SERPL-MCNC: 4.8 MMOL/L — SIGNIFICANT CHANGE UP (ref 3.5–5)
POTASSIUM SERPL-MCNC: 5.1 MMOL/L — HIGH (ref 3.5–5)
POTASSIUM SERPL-MCNC: 5.1 MMOL/L — HIGH (ref 3.5–5)
POTASSIUM SERPL-MCNC: 5.3 MMOL/L — HIGH (ref 3.5–5)
POTASSIUM SERPL-MCNC: 5.3 MMOL/L — HIGH (ref 3.5–5)
POTASSIUM SERPL-MCNC: 5.5 MMOL/L — HIGH (ref 3.5–5)
POTASSIUM SERPL-MCNC: 5.6 MMOL/L — HIGH (ref 3.5–5)
POTASSIUM SERPL-MCNC: 5.7 MMOL/L — HIGH (ref 3.5–5)
POTASSIUM SERPL-MCNC: 5.8 MMOL/L — HIGH (ref 3.5–5)
POTASSIUM SERPL-MCNC: 5.8 MMOL/L — HIGH (ref 3.5–5)
POTASSIUM SERPL-MCNC: 5.9 MMOL/L — HIGH (ref 3.5–5)
POTASSIUM SERPL-MCNC: 6 MMOL/L — CRITICAL HIGH (ref 3.5–5)
POTASSIUM SERPL-MCNC: 6.2 MMOL/L — CRITICAL HIGH (ref 3.5–5)
POTASSIUM SERPL-MCNC: 6.5 MMOL/L — CRITICAL HIGH (ref 3.5–5)
POTASSIUM SERPL-SCNC: 4.2 MMOL/L — SIGNIFICANT CHANGE UP (ref 3.5–5)
POTASSIUM SERPL-SCNC: 4.4 MMOL/L — SIGNIFICANT CHANGE UP (ref 3.5–5)
POTASSIUM SERPL-SCNC: 4.5 MMOL/L — SIGNIFICANT CHANGE UP (ref 3.5–5)
POTASSIUM SERPL-SCNC: 4.8 MMOL/L — SIGNIFICANT CHANGE UP (ref 3.5–5)
POTASSIUM SERPL-SCNC: 4.8 MMOL/L — SIGNIFICANT CHANGE UP (ref 3.5–5)
POTASSIUM SERPL-SCNC: 5.1 MMOL/L — HIGH (ref 3.5–5)
POTASSIUM SERPL-SCNC: 5.1 MMOL/L — HIGH (ref 3.5–5)
POTASSIUM SERPL-SCNC: 5.3 MMOL/L — HIGH (ref 3.5–5)
POTASSIUM SERPL-SCNC: 5.3 MMOL/L — HIGH (ref 3.5–5)
POTASSIUM SERPL-SCNC: 5.5 MMOL/L — HIGH (ref 3.5–5)
POTASSIUM SERPL-SCNC: 5.6 MMOL/L — HIGH (ref 3.5–5)
POTASSIUM SERPL-SCNC: 5.7 MMOL/L — HIGH (ref 3.5–5)
POTASSIUM SERPL-SCNC: 5.8 MMOL/L — HIGH (ref 3.5–5)
POTASSIUM SERPL-SCNC: 5.8 MMOL/L — HIGH (ref 3.5–5)
POTASSIUM SERPL-SCNC: 5.9 MMOL/L — HIGH (ref 3.5–5)
POTASSIUM SERPL-SCNC: 6 MMOL/L — CRITICAL HIGH (ref 3.5–5)
POTASSIUM SERPL-SCNC: 6.2 MMOL/L — CRITICAL HIGH (ref 3.5–5)
POTASSIUM SERPL-SCNC: 6.5 MMOL/L — CRITICAL HIGH (ref 3.5–5)
PROT SERPL-MCNC: 3.6 G/DL — LOW (ref 6–8)
PROT SERPL-MCNC: 3.6 G/DL — LOW (ref 6–8)
PROT SERPL-MCNC: 3.8 G/DL — LOW (ref 6–8)
PROT SERPL-MCNC: 3.9 G/DL — LOW (ref 6–8)
PROT SERPL-MCNC: 4.1 G/DL — LOW (ref 6–8)
PROT SERPL-MCNC: 4.1 G/DL — LOW (ref 6–8)
PROT SERPL-MCNC: 4.4 G/DL — LOW (ref 6–8)
PROT SERPL-MCNC: 4.9 G/DL — LOW (ref 6–8)
PROT UR-MCNC: ABNORMAL
PTH-INTACT FLD-MCNC: 104 PG/ML — HIGH (ref 15–65)
RBC # BLD: 2.31 M/UL — LOW (ref 4.7–6.1)
RBC # BLD: 2.49 M/UL — LOW (ref 4.7–6.1)
RBC # BLD: 2.52 M/UL — LOW (ref 4.7–6.1)
RBC # BLD: 2.57 M/UL — LOW (ref 4.7–6.1)
RBC # BLD: 2.57 M/UL — LOW (ref 4.7–6.1)
RBC # BLD: 2.6 M/UL — LOW (ref 4.7–6.1)
RBC # BLD: 2.64 M/UL — LOW (ref 4.7–6.1)
RBC # BLD: 2.64 M/UL — LOW (ref 4.7–6.1)
RBC # BLD: 2.75 M/UL — LOW (ref 4.7–6.1)
RBC # BLD: 2.79 M/UL — LOW (ref 4.7–6.1)
RBC # BLD: 2.82 M/UL — LOW (ref 4.7–6.1)
RBC # BLD: 3.02 M/UL — LOW (ref 4.7–6.1)
RBC # BLD: 3.27 M/UL — LOW (ref 4.7–6.1)
RBC # BLD: 3.31 M/UL — LOW (ref 4.7–6.1)
RBC # BLD: 3.35 M/UL — LOW (ref 4.7–6.1)
RBC # BLD: 3.36 M/UL — LOW (ref 4.7–6.1)
RBC # BLD: 3.38 M/UL — LOW (ref 4.7–6.1)
RBC # BLD: 3.43 M/UL — LOW (ref 4.7–6.1)
RBC # FLD: 15.7 % — HIGH (ref 11.5–14.5)
RBC # FLD: 15.9 % — HIGH (ref 11.5–14.5)
RBC # FLD: 16 % — HIGH (ref 11.5–14.5)
RBC # FLD: 16 % — HIGH (ref 11.5–14.5)
RBC # FLD: 16.3 % — HIGH (ref 11.5–14.5)
RBC # FLD: 16.4 % — HIGH (ref 11.5–14.5)
RBC # FLD: 16.5 % — HIGH (ref 11.5–14.5)
RBC # FLD: 16.6 % — HIGH (ref 11.5–14.5)
RBC # FLD: 16.7 % — HIGH (ref 11.5–14.5)
RBC # FLD: 16.7 % — HIGH (ref 11.5–14.5)
RBC # FLD: 17 % — HIGH (ref 11.5–14.5)
RBC BLD AUTO: NORMAL — SIGNIFICANT CHANGE UP
RBC CASTS # UR COMP ASSIST: 15 /HPF — HIGH (ref 0–4)
SAO2 % BLDA: 81 % — LOW (ref 94–98)
SAO2 % BLDV: 24 % — SIGNIFICANT CHANGE UP
SAO2 % BLDV: 34 % — SIGNIFICANT CHANGE UP
SODIUM SERPL-SCNC: 132 MMOL/L — LOW (ref 135–146)
SODIUM SERPL-SCNC: 135 MMOL/L — SIGNIFICANT CHANGE UP (ref 135–146)
SODIUM SERPL-SCNC: 136 MMOL/L — SIGNIFICANT CHANGE UP (ref 135–146)
SODIUM SERPL-SCNC: 137 MMOL/L — SIGNIFICANT CHANGE UP (ref 135–146)
SODIUM SERPL-SCNC: 138 MMOL/L — SIGNIFICANT CHANGE UP (ref 135–146)
SODIUM SERPL-SCNC: 139 MMOL/L — SIGNIFICANT CHANGE UP (ref 135–146)
SODIUM SERPL-SCNC: 139 MMOL/L — SIGNIFICANT CHANGE UP (ref 135–146)
SODIUM SERPL-SCNC: 140 MMOL/L — SIGNIFICANT CHANGE UP (ref 135–146)
SP GR SPEC: 1.01 — SIGNIFICANT CHANGE UP (ref 1.01–1.02)
SPECIMEN SOURCE: SIGNIFICANT CHANGE UP
SURGICAL PATHOLOGY STUDY: SIGNIFICANT CHANGE UP
TOTAL CHOLESTEROL/HDL RATIO MEASUREMENT: 3.1 RATIO — LOW (ref 4–5.5)
TRIGL SERPL-MCNC: 118 MG/DL — SIGNIFICANT CHANGE UP (ref 10–149)
TROPONIN T SERPL-MCNC: 0.14 NG/ML — CRITICAL HIGH
TROPONIN T SERPL-MCNC: 0.15 NG/ML — CRITICAL HIGH
TSH SERPL-MCNC: 14.1 UIU/ML — HIGH (ref 0.27–4.2)
TSH SERPL-MCNC: 28.5 UIU/ML — HIGH (ref 0.27–4.2)
UROBILINOGEN FLD QL: SIGNIFICANT CHANGE UP
VANCOMYCIN FLD-MCNC: 17.8 UG/ML — HIGH (ref 5–10)
WBC # BLD: 10.67 K/UL — SIGNIFICANT CHANGE UP (ref 4.8–10.8)
WBC # BLD: 11.03 K/UL — HIGH (ref 4.8–10.8)
WBC # BLD: 11.1 K/UL — HIGH (ref 4.8–10.8)
WBC # BLD: 11.13 K/UL — HIGH (ref 4.8–10.8)
WBC # BLD: 11.22 K/UL — HIGH (ref 4.8–10.8)
WBC # BLD: 11.44 K/UL — HIGH (ref 4.8–10.8)
WBC # BLD: 11.77 K/UL — HIGH (ref 4.8–10.8)
WBC # BLD: 12.23 K/UL — HIGH (ref 4.8–10.8)
WBC # BLD: 13.21 K/UL — HIGH (ref 4.8–10.8)
WBC # BLD: 14.17 K/UL — HIGH (ref 4.8–10.8)
WBC # BLD: 18.04 K/UL — HIGH (ref 4.8–10.8)
WBC # BLD: 18.34 K/UL — HIGH (ref 4.8–10.8)
WBC # BLD: 27.35 K/UL — HIGH (ref 4.8–10.8)
WBC # BLD: 6.67 K/UL — SIGNIFICANT CHANGE UP (ref 4.8–10.8)
WBC # BLD: 8.45 K/UL — SIGNIFICANT CHANGE UP (ref 4.8–10.8)
WBC # BLD: 9 K/UL — SIGNIFICANT CHANGE UP (ref 4.8–10.8)
WBC # BLD: 9.43 K/UL — SIGNIFICANT CHANGE UP (ref 4.8–10.8)
WBC # BLD: 9.43 K/UL — SIGNIFICANT CHANGE UP (ref 4.8–10.8)
WBC # FLD AUTO: 10.67 K/UL — SIGNIFICANT CHANGE UP (ref 4.8–10.8)
WBC # FLD AUTO: 11.03 K/UL — HIGH (ref 4.8–10.8)
WBC # FLD AUTO: 11.1 K/UL — HIGH (ref 4.8–10.8)
WBC # FLD AUTO: 11.13 K/UL — HIGH (ref 4.8–10.8)
WBC # FLD AUTO: 11.22 K/UL — HIGH (ref 4.8–10.8)
WBC # FLD AUTO: 11.44 K/UL — HIGH (ref 4.8–10.8)
WBC # FLD AUTO: 11.77 K/UL — HIGH (ref 4.8–10.8)
WBC # FLD AUTO: 12.23 K/UL — HIGH (ref 4.8–10.8)
WBC # FLD AUTO: 13.21 K/UL — HIGH (ref 4.8–10.8)
WBC # FLD AUTO: 14.17 K/UL — HIGH (ref 4.8–10.8)
WBC # FLD AUTO: 18.04 K/UL — HIGH (ref 4.8–10.8)
WBC # FLD AUTO: 18.34 K/UL — HIGH (ref 4.8–10.8)
WBC # FLD AUTO: 27.35 K/UL — HIGH (ref 4.8–10.8)
WBC # FLD AUTO: 6.67 K/UL — SIGNIFICANT CHANGE UP (ref 4.8–10.8)
WBC # FLD AUTO: 8.45 K/UL — SIGNIFICANT CHANGE UP (ref 4.8–10.8)
WBC # FLD AUTO: 9 K/UL — SIGNIFICANT CHANGE UP (ref 4.8–10.8)
WBC # FLD AUTO: 9.43 K/UL — SIGNIFICANT CHANGE UP (ref 4.8–10.8)
WBC # FLD AUTO: 9.43 K/UL — SIGNIFICANT CHANGE UP (ref 4.8–10.8)
WBC UR QL: 62 /HPF — HIGH (ref 0–5)

## 2020-01-01 PROCEDURE — 99233 SBSQ HOSP IP/OBS HIGH 50: CPT | Mod: GV

## 2020-01-01 PROCEDURE — 99232 SBSQ HOSP IP/OBS MODERATE 35: CPT

## 2020-01-01 PROCEDURE — 99497 ADVNCD CARE PLAN 30 MIN: CPT | Mod: 25

## 2020-01-01 PROCEDURE — 99283 EMERGENCY DEPT VISIT LOW MDM: CPT

## 2020-01-01 PROCEDURE — 99233 SBSQ HOSP IP/OBS HIGH 50: CPT

## 2020-01-01 PROCEDURE — 71045 X-RAY EXAM CHEST 1 VIEW: CPT | Mod: 26

## 2020-01-01 PROCEDURE — 90792 PSYCH DIAG EVAL W/MED SRVCS: CPT

## 2020-01-01 PROCEDURE — 99221 1ST HOSP IP/OBS SF/LOW 40: CPT

## 2020-01-01 PROCEDURE — 99231 SBSQ HOSP IP/OBS SF/LOW 25: CPT

## 2020-01-01 PROCEDURE — 99285 EMERGENCY DEPT VISIT HI MDM: CPT | Mod: GC

## 2020-01-01 PROCEDURE — 99239 HOSP IP/OBS DSCHRG MGMT >30: CPT

## 2020-01-01 PROCEDURE — 93010 ELECTROCARDIOGRAM REPORT: CPT

## 2020-01-01 PROCEDURE — 99232 SBSQ HOSP IP/OBS MODERATE 35: CPT | Mod: GC

## 2020-01-01 PROCEDURE — 99238 HOSP IP/OBS DSCHRG MGMT 30/<: CPT

## 2020-01-01 PROCEDURE — 99223 1ST HOSP IP/OBS HIGH 75: CPT

## 2020-01-01 PROCEDURE — 99497 ADVNCD CARE PLAN 30 MIN: CPT

## 2020-01-01 PROCEDURE — 99231 SBSQ HOSP IP/OBS SF/LOW 25: CPT | Mod: GV

## 2020-01-01 PROCEDURE — 93306 TTE W/DOPPLER COMPLETE: CPT | Mod: 26

## 2020-01-01 PROCEDURE — 99222 1ST HOSP IP/OBS MODERATE 55: CPT

## 2020-01-01 RX ORDER — METHADONE HYDROCHLORIDE 40 MG/1
80 TABLET ORAL ONCE
Refills: 0 | Status: DISCONTINUED | OUTPATIENT
Start: 2020-01-01 | End: 2020-01-01

## 2020-01-01 RX ORDER — TRAMADOL HYDROCHLORIDE 50 MG/1
50 TABLET ORAL ONCE
Refills: 0 | Status: DISCONTINUED | OUTPATIENT
Start: 2020-01-01 | End: 2020-01-01

## 2020-01-01 RX ORDER — MORPHINE SULFATE 50 MG/1
16 CAPSULE, EXTENDED RELEASE ORAL
Qty: 100 | Refills: 0 | Status: DISCONTINUED | OUTPATIENT
Start: 2020-01-01 | End: 2020-01-01

## 2020-01-01 RX ORDER — VANCOMYCIN HCL 1 G
VIAL (EA) INTRAVENOUS
Refills: 0 | Status: DISCONTINUED | OUTPATIENT
Start: 2020-01-01 | End: 2020-01-01

## 2020-01-01 RX ORDER — SCOPALAMINE 1 MG/3D
1 PATCH, EXTENDED RELEASE TRANSDERMAL
Refills: 0 | Status: DISCONTINUED | OUTPATIENT
Start: 2020-01-01 | End: 2020-01-01

## 2020-01-01 RX ORDER — AMITRIPTYLINE HCL 25 MG
25 TABLET ORAL AT BEDTIME
Refills: 0 | Status: DISCONTINUED | OUTPATIENT
Start: 2020-01-01 | End: 2020-01-01

## 2020-01-01 RX ORDER — METHADONE HYDROCHLORIDE 40 MG/1
60 TABLET ORAL EVERY 8 HOURS
Refills: 0 | Status: DISCONTINUED | OUTPATIENT
Start: 2020-01-01 | End: 2020-01-01

## 2020-01-01 RX ORDER — CALCIUM ACETATE 667 MG
667 TABLET ORAL
Refills: 0 | Status: DISCONTINUED | OUTPATIENT
Start: 2020-01-01 | End: 2020-01-01

## 2020-01-01 RX ORDER — PATIROMER 8.4 G/1
8.4 POWDER, FOR SUSPENSION ORAL
Qty: 252 | Refills: 0
Start: 2020-01-01 | End: 2020-01-01

## 2020-01-01 RX ORDER — METRONIDAZOLE 500 MG
500 TABLET ORAL
Refills: 0 | Status: DISCONTINUED | OUTPATIENT
Start: 2020-01-01 | End: 2020-01-01

## 2020-01-01 RX ORDER — OXYCODONE HYDROCHLORIDE 5 MG/1
30 TABLET ORAL EVERY 4 HOURS
Refills: 0 | Status: DISCONTINUED | OUTPATIENT
Start: 2020-01-01 | End: 2020-01-01

## 2020-01-01 RX ORDER — SEVELAMER CARBONATE 2400 MG/1
1 POWDER, FOR SUSPENSION ORAL
Qty: 90 | Refills: 0
Start: 2020-01-01 | End: 2020-01-01

## 2020-01-01 RX ORDER — SODIUM HYPOCHLORITE 0.125 %
1 SOLUTION, NON-ORAL MISCELLANEOUS
Refills: 0 | Status: DISCONTINUED | OUTPATIENT
Start: 2020-01-01 | End: 2020-01-01

## 2020-01-01 RX ORDER — OXYCODONE HYDROCHLORIDE 5 MG/1
1 TABLET ORAL
Qty: 0 | Refills: 0 | DISCHARGE

## 2020-01-01 RX ORDER — OXYCODONE HYDROCHLORIDE 5 MG/1
10 TABLET ORAL EVERY 12 HOURS
Refills: 0 | Status: DISCONTINUED | OUTPATIENT
Start: 2020-01-01 | End: 2020-01-01

## 2020-01-01 RX ORDER — ONDANSETRON 8 MG/1
4 TABLET, FILM COATED ORAL ONCE
Refills: 0 | Status: COMPLETED | OUTPATIENT
Start: 2020-01-01 | End: 2020-01-01

## 2020-01-01 RX ORDER — METHADONE HYDROCHLORIDE 40 MG/1
60 TABLET ORAL THREE TIMES A DAY
Refills: 0 | Status: DISCONTINUED | OUTPATIENT
Start: 2020-01-01 | End: 2020-01-01

## 2020-01-01 RX ORDER — INFLUENZA VIRUS VACCINE 15; 15; 15; 15 UG/.5ML; UG/.5ML; UG/.5ML; UG/.5ML
0.5 SUSPENSION INTRAMUSCULAR ONCE
Refills: 0 | Status: DISCONTINUED | OUTPATIENT
Start: 2020-01-01 | End: 2020-01-01

## 2020-01-01 RX ORDER — HALOPERIDOL DECANOATE 100 MG/ML
2 INJECTION INTRAMUSCULAR
Refills: 0 | Status: DISCONTINUED | OUTPATIENT
Start: 2020-01-01 | End: 2020-01-01

## 2020-01-01 RX ORDER — OXYCODONE HYDROCHLORIDE 5 MG/1
2 TABLET ORAL
Qty: 0 | Refills: 0 | DISCHARGE
Start: 2020-01-01

## 2020-01-01 RX ORDER — SODIUM CHLORIDE 9 MG/ML
1000 INJECTION, SOLUTION INTRAVENOUS
Refills: 0 | Status: DISCONTINUED | OUTPATIENT
Start: 2020-01-01 | End: 2020-01-01

## 2020-01-01 RX ORDER — MUPIROCIN 20 MG/G
1 OINTMENT TOPICAL
Refills: 0 | Status: DISCONTINUED | OUTPATIENT
Start: 2020-01-01 | End: 2020-01-01

## 2020-01-01 RX ORDER — MORPHINE SULFATE 50 MG/1
10 CAPSULE, EXTENDED RELEASE ORAL ONCE
Refills: 0 | Status: DISCONTINUED | OUTPATIENT
Start: 2020-01-01 | End: 2020-01-01

## 2020-01-01 RX ORDER — CALCIUM ACETATE 667 MG
1334 TABLET ORAL
Qty: 0 | Refills: 0 | DISCHARGE
Start: 2020-01-01

## 2020-01-01 RX ORDER — SODIUM CHLORIDE 9 MG/ML
1000 INJECTION INTRAMUSCULAR; INTRAVENOUS; SUBCUTANEOUS
Refills: 0 | Status: DISCONTINUED | OUTPATIENT
Start: 2020-01-01 | End: 2020-01-01

## 2020-01-01 RX ORDER — CEFPODOXIME PROXETIL 100 MG
200 TABLET ORAL DAILY
Refills: 0 | Status: DISCONTINUED | OUTPATIENT
Start: 2020-01-01 | End: 2020-01-01

## 2020-01-01 RX ORDER — CEFTRIAXONE 500 MG/1
1000 INJECTION, POWDER, FOR SOLUTION INTRAMUSCULAR; INTRAVENOUS EVERY 24 HOURS
Refills: 0 | Status: DISCONTINUED | OUTPATIENT
Start: 2020-01-01 | End: 2020-01-01

## 2020-01-01 RX ORDER — VANCOMYCIN HCL 1 G
1000 VIAL (EA) INTRAVENOUS EVERY 24 HOURS
Refills: 0 | Status: DISCONTINUED | OUTPATIENT
Start: 2020-01-01 | End: 2020-01-01

## 2020-01-01 RX ORDER — HYDROCORTISONE 20 MG
20 TABLET ORAL DAILY
Refills: 0 | Status: DISCONTINUED | OUTPATIENT
Start: 2020-01-01 | End: 2020-01-01

## 2020-01-01 RX ORDER — CEFEPIME 1 G/1
500 INJECTION, POWDER, FOR SOLUTION INTRAMUSCULAR; INTRAVENOUS EVERY 24 HOURS
Refills: 0 | Status: DISCONTINUED | OUTPATIENT
Start: 2020-01-01 | End: 2020-01-01

## 2020-01-01 RX ORDER — NYSTATIN CREAM 100000 [USP'U]/G
1 CREAM TOPICAL
Refills: 0 | Status: DISCONTINUED | OUTPATIENT
Start: 2020-01-01 | End: 2020-01-01

## 2020-01-01 RX ORDER — DIAZEPAM 5 MG
5 TABLET ORAL
Refills: 0 | Status: DISCONTINUED | OUTPATIENT
Start: 2020-01-01 | End: 2020-01-01

## 2020-01-01 RX ORDER — OXYCODONE HYDROCHLORIDE 5 MG/1
30 TABLET ORAL EVERY 8 HOURS
Refills: 0 | Status: DISCONTINUED | OUTPATIENT
Start: 2020-01-01 | End: 2020-01-01

## 2020-01-01 RX ORDER — SODIUM HYPOCHLORITE 0.125 %
1 SOLUTION, NON-ORAL MISCELLANEOUS
Qty: 0 | Refills: 0 | DISCHARGE
Start: 2020-01-01

## 2020-01-01 RX ORDER — CALCIUM ACETATE 667 MG
1334 TABLET ORAL
Refills: 0 | Status: DISCONTINUED | OUTPATIENT
Start: 2020-01-01 | End: 2020-01-01

## 2020-01-01 RX ORDER — FENTANYL CITRATE 50 UG/ML
1 INJECTION INTRAVENOUS
Refills: 0 | Status: DISCONTINUED | OUTPATIENT
Start: 2020-01-01 | End: 2020-01-01

## 2020-01-01 RX ORDER — DARBEPOETIN ALFA IN POLYSORBAT 200MCG/0.4
20 PEN INJECTOR (ML) SUBCUTANEOUS
Refills: 0 | Status: DISCONTINUED | OUTPATIENT
Start: 2020-01-01 | End: 2020-01-01

## 2020-01-01 RX ORDER — DIAZEPAM 5 MG
1 TABLET ORAL
Qty: 0 | Refills: 0 | DISCHARGE

## 2020-01-01 RX ORDER — MAGNESIUM SULFATE 500 MG/ML
2 VIAL (ML) INJECTION ONCE
Refills: 0 | Status: COMPLETED | OUTPATIENT
Start: 2020-01-01 | End: 2020-01-01

## 2020-01-01 RX ORDER — INSULIN HUMAN 100 [IU]/ML
5 INJECTION, SOLUTION SUBCUTANEOUS ONCE
Refills: 0 | Status: COMPLETED | OUTPATIENT
Start: 2020-01-01 | End: 2020-01-01

## 2020-01-01 RX ORDER — ACETAMINOPHEN 500 MG
650 TABLET ORAL EVERY 6 HOURS
Refills: 0 | Status: DISCONTINUED | OUTPATIENT
Start: 2020-01-01 | End: 2020-01-01

## 2020-01-01 RX ORDER — METHADONE HYDROCHLORIDE 40 MG/1
60 TABLET ORAL ONCE
Refills: 0 | Status: DISCONTINUED | OUTPATIENT
Start: 2020-01-01 | End: 2020-01-01

## 2020-01-01 RX ORDER — FLUCONAZOLE 150 MG/1
50 TABLET ORAL
Refills: 0 | Status: DISCONTINUED | OUTPATIENT
Start: 2020-01-01 | End: 2020-01-01

## 2020-01-01 RX ORDER — MAGNESIUM SULFATE 500 MG/ML
1 VIAL (ML) INJECTION ONCE
Refills: 0 | Status: COMPLETED | OUTPATIENT
Start: 2020-01-01 | End: 2020-01-01

## 2020-01-01 RX ORDER — METHADONE HYDROCHLORIDE 40 MG/1
80 TABLET ORAL EVERY 8 HOURS
Refills: 0 | Status: DISCONTINUED | OUTPATIENT
Start: 2020-01-01 | End: 2020-01-01

## 2020-01-01 RX ORDER — DEXTROSE 50 % IN WATER 50 %
25 SYRINGE (ML) INTRAVENOUS ONCE
Refills: 0 | Status: COMPLETED | OUTPATIENT
Start: 2020-01-01 | End: 2020-01-01

## 2020-01-01 RX ORDER — SODIUM CHLORIDE 9 MG/ML
500 INJECTION INTRAMUSCULAR; INTRAVENOUS; SUBCUTANEOUS ONCE
Refills: 0 | Status: COMPLETED | OUTPATIENT
Start: 2020-01-01 | End: 2020-01-01

## 2020-01-01 RX ORDER — CEFEPIME 1 G/1
500 INJECTION, POWDER, FOR SOLUTION INTRAMUSCULAR; INTRAVENOUS ONCE
Refills: 0 | Status: COMPLETED | OUTPATIENT
Start: 2020-01-01 | End: 2020-01-01

## 2020-01-01 RX ORDER — HYDROCORTISONE 20 MG
10 TABLET ORAL DAILY
Refills: 0 | Status: DISCONTINUED | OUTPATIENT
Start: 2020-01-01 | End: 2020-01-01

## 2020-01-01 RX ORDER — VANCOMYCIN HCL 1 G
1000 VIAL (EA) INTRAVENOUS ONCE
Refills: 0 | Status: COMPLETED | OUTPATIENT
Start: 2020-01-01 | End: 2020-01-01

## 2020-01-01 RX ORDER — CHLORHEXIDINE GLUCONATE 213 G/1000ML
1 SOLUTION TOPICAL
Refills: 0 | Status: DISCONTINUED | OUTPATIENT
Start: 2020-01-01 | End: 2020-01-01

## 2020-01-01 RX ORDER — CHOLECALCIFEROL (VITAMIN D3) 125 MCG
2000 CAPSULE ORAL DAILY
Refills: 0 | Status: DISCONTINUED | OUTPATIENT
Start: 2020-01-01 | End: 2020-01-01

## 2020-01-01 RX ORDER — CEFEPIME 1 G/1
500 INJECTION, POWDER, FOR SOLUTION INTRAMUSCULAR; INTRAVENOUS DAILY
Refills: 0 | Status: DISCONTINUED | OUTPATIENT
Start: 2020-01-01 | End: 2020-01-01

## 2020-01-01 RX ORDER — POLYETHYLENE GLYCOL 3350 17 G/17G
17 POWDER, FOR SOLUTION ORAL
Qty: 0 | Refills: 0 | DISCHARGE
Start: 2020-01-01

## 2020-01-01 RX ORDER — DEXTROSE 10 % IN WATER 10 %
250 INTRAVENOUS SOLUTION INTRAVENOUS
Refills: 0 | Status: COMPLETED | OUTPATIENT
Start: 2020-01-01 | End: 2020-01-01

## 2020-01-01 RX ORDER — METHADONE HYDROCHLORIDE 40 MG/1
8 TABLET ORAL
Qty: 0 | Refills: 0 | DISCHARGE
Start: 2020-01-01

## 2020-01-01 RX ORDER — VANCOMYCIN HCL 1 G
750 VIAL (EA) INTRAVENOUS DAILY
Refills: 0 | Status: DISCONTINUED | OUTPATIENT
Start: 2020-01-01 | End: 2020-01-01

## 2020-01-01 RX ORDER — MORPHINE SULFATE 50 MG/1
2 CAPSULE, EXTENDED RELEASE ORAL EVERY 4 HOURS
Refills: 0 | Status: DISCONTINUED | OUTPATIENT
Start: 2020-01-01 | End: 2020-01-01

## 2020-01-01 RX ORDER — DRONABINOL 2.5 MG
2.5 CAPSULE ORAL EVERY 12 HOURS
Refills: 0 | Status: DISCONTINUED | OUTPATIENT
Start: 2020-01-01 | End: 2020-01-01

## 2020-01-01 RX ORDER — MORPHINE SULFATE 50 MG/1
2 CAPSULE, EXTENDED RELEASE ORAL ONCE
Refills: 0 | Status: DISCONTINUED | OUTPATIENT
Start: 2020-01-01 | End: 2020-01-01

## 2020-01-01 RX ORDER — SODIUM CHLORIDE 9 MG/ML
250 INJECTION INTRAMUSCULAR; INTRAVENOUS; SUBCUTANEOUS ONCE
Refills: 0 | Status: COMPLETED | OUTPATIENT
Start: 2020-01-01 | End: 2020-01-01

## 2020-01-01 RX ORDER — COLLAGENASE CLOSTRIDIUM HIST. 250 UNIT/G
1 OINTMENT (GRAM) TOPICAL
Refills: 0 | Status: DISCONTINUED | OUTPATIENT
Start: 2020-01-01 | End: 2020-01-01

## 2020-01-01 RX ORDER — COLLAGENASE CLOSTRIDIUM HIST. 250 UNIT/G
1 OINTMENT (GRAM) TOPICAL
Qty: 0 | Refills: 0 | DISCHARGE
Start: 2020-01-01

## 2020-01-01 RX ORDER — DARBEPOETIN ALFA IN POLYSORBAT 200MCG/0.4
20 PEN INJECTOR (ML) SUBCUTANEOUS
Qty: 0 | Refills: 0 | DISCHARGE
Start: 2020-01-01

## 2020-01-01 RX ORDER — SODIUM POLYSTYRENE SULFONATE 4.1 MEQ/G
30 POWDER, FOR SUSPENSION ORAL ONCE
Refills: 0 | Status: COMPLETED | OUTPATIENT
Start: 2020-01-01 | End: 2020-01-01

## 2020-01-01 RX ORDER — POLYETHYLENE GLYCOL 3350 17 G/17G
17 POWDER, FOR SOLUTION ORAL AT BEDTIME
Refills: 0 | Status: DISCONTINUED | OUTPATIENT
Start: 2020-01-01 | End: 2020-01-01

## 2020-01-01 RX ORDER — PANTOPRAZOLE SODIUM 20 MG/1
40 TABLET, DELAYED RELEASE ORAL ONCE
Refills: 0 | Status: COMPLETED | OUTPATIENT
Start: 2020-01-01 | End: 2020-01-01

## 2020-01-01 RX ORDER — DIAZEPAM 5 MG
1 TABLET ORAL
Qty: 0 | Refills: 0 | DISCHARGE
Start: 2020-01-01

## 2020-01-01 RX ORDER — MEGESTROL ACETATE 40 MG/ML
400 SUSPENSION ORAL DAILY
Refills: 0 | Status: DISCONTINUED | OUTPATIENT
Start: 2020-01-01 | End: 2020-01-01

## 2020-01-01 RX ORDER — METHADONE HYDROCHLORIDE 40 MG/1
80 TABLET ORAL
Refills: 0 | Status: DISCONTINUED | OUTPATIENT
Start: 2020-01-01 | End: 2020-01-01

## 2020-01-01 RX ORDER — CALCIUM ACETATE 667 MG
1 TABLET ORAL
Qty: 0 | Refills: 0 | DISCHARGE
Start: 2020-01-01

## 2020-01-01 RX ORDER — MIDODRINE HYDROCHLORIDE 2.5 MG/1
2.5 TABLET ORAL THREE TIMES A DAY
Refills: 0 | Status: DISCONTINUED | OUTPATIENT
Start: 2020-01-01 | End: 2020-01-01

## 2020-01-01 RX ORDER — OXYCODONE HYDROCHLORIDE 5 MG/1
15 TABLET ORAL ONCE
Refills: 0 | Status: DISCONTINUED | OUTPATIENT
Start: 2020-01-01 | End: 2020-01-01

## 2020-01-01 RX ORDER — OXYCODONE AND ACETAMINOPHEN 5; 325 MG/1; MG/1
1 TABLET ORAL ONCE
Refills: 0 | Status: DISCONTINUED | OUTPATIENT
Start: 2020-01-01 | End: 2020-01-01

## 2020-01-01 RX ORDER — DAPTOMYCIN 500 MG/10ML
240 INJECTION, POWDER, LYOPHILIZED, FOR SOLUTION INTRAVENOUS
Refills: 0 | Status: DISCONTINUED | OUTPATIENT
Start: 2020-01-01 | End: 2020-01-01

## 2020-01-01 RX ORDER — MIDODRINE HYDROCHLORIDE 2.5 MG/1
1 TABLET ORAL
Qty: 0 | Refills: 0 | DISCHARGE
Start: 2020-01-01

## 2020-01-01 RX ORDER — OXYCODONE HYDROCHLORIDE 5 MG/1
20 TABLET ORAL EVERY 12 HOURS
Refills: 0 | Status: DISCONTINUED | OUTPATIENT
Start: 2020-01-01 | End: 2020-01-01

## 2020-01-01 RX ORDER — INSULIN HUMAN 100 [IU]/ML
10 INJECTION, SOLUTION SUBCUTANEOUS ONCE
Refills: 0 | Status: COMPLETED | OUTPATIENT
Start: 2020-01-01 | End: 2020-01-01

## 2020-01-01 RX ORDER — CEPHALEXIN 500 MG
250 CAPSULE ORAL EVERY 8 HOURS
Refills: 0 | Status: DISCONTINUED | OUTPATIENT
Start: 2020-01-01 | End: 2020-01-01

## 2020-01-01 RX ORDER — OXYCODONE HYDROCHLORIDE 5 MG/1
1 TABLET ORAL
Qty: 0 | Refills: 0 | DISCHARGE
Start: 2020-01-01

## 2020-01-01 RX ORDER — ONDANSETRON 8 MG/1
4 TABLET, FILM COATED ORAL
Refills: 0 | Status: DISCONTINUED | OUTPATIENT
Start: 2020-01-01 | End: 2020-01-01

## 2020-01-01 RX ORDER — SODIUM BICARBONATE 1 MEQ/ML
1300 SYRINGE (ML) INTRAVENOUS THREE TIMES A DAY
Refills: 0 | Status: DISCONTINUED | OUTPATIENT
Start: 2020-01-01 | End: 2020-01-01

## 2020-01-01 RX ORDER — MORPHINE SULFATE 50 MG/1
60 CAPSULE, EXTENDED RELEASE ORAL
Refills: 0 | Status: DISCONTINUED | OUTPATIENT
Start: 2020-01-01 | End: 2020-01-01

## 2020-01-01 RX ORDER — MORPHINE SULFATE 50 MG/1
18 CAPSULE, EXTENDED RELEASE ORAL
Refills: 0 | Status: DISCONTINUED | OUTPATIENT
Start: 2020-01-01 | End: 2020-01-01

## 2020-01-01 RX ORDER — SEVELAMER CARBONATE 2400 MG/1
800 POWDER, FOR SUSPENSION ORAL
Refills: 0 | Status: DISCONTINUED | OUTPATIENT
Start: 2020-01-01 | End: 2020-01-01

## 2020-01-01 RX ORDER — OXYBUTYNIN CHLORIDE 5 MG
10 TABLET ORAL
Refills: 0 | Status: DISCONTINUED | OUTPATIENT
Start: 2020-01-01 | End: 2020-01-01

## 2020-01-01 RX ORDER — MORPHINE SULFATE 50 MG/1
4 CAPSULE, EXTENDED RELEASE ORAL ONCE
Refills: 0 | Status: DISCONTINUED | OUTPATIENT
Start: 2020-01-01 | End: 2020-01-01

## 2020-01-01 RX ORDER — HEPARIN SODIUM 5000 [USP'U]/ML
5000 INJECTION INTRAVENOUS; SUBCUTANEOUS EVERY 12 HOURS
Refills: 0 | Status: DISCONTINUED | OUTPATIENT
Start: 2020-01-01 | End: 2020-01-01

## 2020-01-01 RX ORDER — DRONABINOL 2.5 MG
1 CAPSULE ORAL
Qty: 0 | Refills: 0 | DISCHARGE
Start: 2020-01-01

## 2020-01-01 RX ORDER — FLUCONAZOLE 150 MG/1
1 TABLET ORAL
Qty: 0 | Refills: 0 | DISCHARGE
Start: 2020-01-01 | End: 2020-01-01

## 2020-01-01 RX ORDER — OXYCODONE AND ACETAMINOPHEN 5; 325 MG/1; MG/1
1 TABLET ORAL EVERY 4 HOURS
Refills: 0 | Status: DISCONTINUED | OUTPATIENT
Start: 2020-01-01 | End: 2020-01-01

## 2020-01-01 RX ORDER — NYSTATIN CREAM 100000 [USP'U]/G
1 CREAM TOPICAL
Qty: 0 | Refills: 0 | DISCHARGE
Start: 2020-01-01

## 2020-01-01 RX ORDER — METRONIDAZOLE 500 MG
500 TABLET ORAL EVERY 8 HOURS
Refills: 0 | Status: DISCONTINUED | OUTPATIENT
Start: 2020-01-01 | End: 2020-01-01

## 2020-01-01 RX ORDER — HEPARIN SODIUM 5000 [USP'U]/ML
5000 INJECTION INTRAVENOUS; SUBCUTANEOUS EVERY 8 HOURS
Refills: 0 | Status: DISCONTINUED | OUTPATIENT
Start: 2020-01-01 | End: 2020-01-01

## 2020-01-01 RX ORDER — FLUCONAZOLE 150 MG/1
100 TABLET ORAL
Refills: 0 | Status: COMPLETED | OUTPATIENT
Start: 2020-01-01 | End: 2020-01-01

## 2020-01-01 RX ORDER — FLUCONAZOLE 150 MG/1
200 TABLET ORAL ONCE
Refills: 0 | Status: DISCONTINUED | OUTPATIENT
Start: 2020-01-01 | End: 2020-01-01

## 2020-01-01 RX ORDER — METHADONE HYDROCHLORIDE 40 MG/1
80 TABLET ORAL
Qty: 0 | Refills: 0 | DISCHARGE

## 2020-01-01 RX ORDER — HALOPERIDOL DECANOATE 100 MG/ML
5 INJECTION INTRAMUSCULAR ONCE
Refills: 0 | Status: COMPLETED | OUTPATIENT
Start: 2020-01-01 | End: 2020-01-01

## 2020-01-01 RX ORDER — MORPHINE SULFATE 50 MG/1
2 CAPSULE, EXTENDED RELEASE ORAL
Qty: 100 | Refills: 0 | Status: DISCONTINUED | OUTPATIENT
Start: 2020-01-01 | End: 2020-01-01

## 2020-01-01 RX ORDER — INSULIN HUMAN 100 [IU]/ML
10 INJECTION, SOLUTION SUBCUTANEOUS ONCE
Refills: 0 | Status: DISCONTINUED | OUTPATIENT
Start: 2020-01-01 | End: 2020-01-01

## 2020-01-01 RX ORDER — MEGESTROL ACETATE 40 MG/ML
20 SUSPENSION ORAL THREE TIMES A DAY
Refills: 0 | Status: DISCONTINUED | OUTPATIENT
Start: 2020-01-01 | End: 2020-01-01

## 2020-01-01 RX ORDER — MORPHINE SULFATE 50 MG/1
18 CAPSULE, EXTENDED RELEASE ORAL
Qty: 100 | Refills: 0 | Status: DISCONTINUED | OUTPATIENT
Start: 2020-01-01 | End: 2020-01-01

## 2020-01-01 RX ORDER — MORPHINE SULFATE 50 MG/1
4 CAPSULE, EXTENDED RELEASE ORAL
Refills: 0 | Status: DISCONTINUED | OUTPATIENT
Start: 2020-01-01 | End: 2020-01-01

## 2020-01-01 RX ORDER — TRAMADOL HYDROCHLORIDE 50 MG/1
25 TABLET ORAL ONCE
Refills: 0 | Status: DISCONTINUED | OUTPATIENT
Start: 2020-01-01 | End: 2020-01-01

## 2020-01-01 RX ORDER — CHOLECALCIFEROL (VITAMIN D3) 125 MCG
2000 CAPSULE ORAL
Qty: 0 | Refills: 0 | DISCHARGE
Start: 2020-01-01

## 2020-01-01 RX ORDER — DEXTROSE 10 % IN WATER 10 %
1000 INTRAVENOUS SOLUTION INTRAVENOUS
Refills: 0 | Status: COMPLETED | OUTPATIENT
Start: 2020-01-01 | End: 2020-01-01

## 2020-01-01 RX ORDER — HALOPERIDOL DECANOATE 100 MG/ML
0.5 INJECTION INTRAMUSCULAR ONCE
Refills: 0 | Status: DISCONTINUED | OUTPATIENT
Start: 2020-01-01 | End: 2020-01-01

## 2020-01-01 RX ORDER — SODIUM BICARBONATE 1 MEQ/ML
650 SYRINGE (ML) INTRAVENOUS THREE TIMES A DAY
Refills: 0 | Status: DISCONTINUED | OUTPATIENT
Start: 2020-01-01 | End: 2020-01-01

## 2020-01-01 RX ORDER — MORPHINE SULFATE 50 MG/1
20 CAPSULE, EXTENDED RELEASE ORAL ONCE
Refills: 0 | Status: DISCONTINUED | OUTPATIENT
Start: 2020-01-01 | End: 2020-01-01

## 2020-01-01 RX ORDER — FLUCONAZOLE 150 MG/1
100 TABLET ORAL ONCE
Refills: 0 | Status: COMPLETED | OUTPATIENT
Start: 2020-01-01 | End: 2020-01-01

## 2020-01-01 RX ORDER — OXYCODONE HYDROCHLORIDE 5 MG/1
30 TABLET ORAL EVERY 6 HOURS
Refills: 0 | Status: DISCONTINUED | OUTPATIENT
Start: 2020-01-01 | End: 2020-01-01

## 2020-01-01 RX ORDER — DEXTROSE 10 % IN WATER 10 %
250 INTRAVENOUS SOLUTION INTRAVENOUS
Refills: 0 | Status: DISCONTINUED | OUTPATIENT
Start: 2020-01-01 | End: 2020-01-01

## 2020-01-01 RX ORDER — MORPHINE SULFATE 50 MG/1
10 CAPSULE, EXTENDED RELEASE ORAL
Refills: 0 | Status: DISCONTINUED | OUTPATIENT
Start: 2020-01-01 | End: 2020-01-01

## 2020-01-01 RX ORDER — METHADONE HYDROCHLORIDE 40 MG/1
10 TABLET ORAL EVERY 8 HOURS
Refills: 0 | Status: DISCONTINUED | OUTPATIENT
Start: 2020-01-01 | End: 2020-01-01

## 2020-01-01 RX ORDER — OXYCODONE HYDROCHLORIDE 5 MG/1
30 TABLET ORAL ONCE
Refills: 0 | Status: DISCONTINUED | OUTPATIENT
Start: 2020-01-01 | End: 2020-01-01

## 2020-01-01 RX ORDER — GLUCAGON INJECTION, SOLUTION 0.5 MG/.1ML
1 INJECTION, SOLUTION SUBCUTANEOUS ONCE
Refills: 0 | Status: COMPLETED | OUTPATIENT
Start: 2020-01-01 | End: 2020-01-01

## 2020-01-01 RX ORDER — LEVOTHYROXINE SODIUM 125 MCG
100 TABLET ORAL DAILY
Refills: 0 | Status: DISCONTINUED | OUTPATIENT
Start: 2020-01-01 | End: 2020-01-01

## 2020-01-01 RX ORDER — ONDANSETRON 8 MG/1
4 TABLET, FILM COATED ORAL ONCE
Refills: 0 | Status: DISCONTINUED | OUTPATIENT
Start: 2020-01-01 | End: 2020-01-01

## 2020-01-01 RX ORDER — GABAPENTIN 400 MG/1
100 CAPSULE ORAL THREE TIMES A DAY
Refills: 0 | Status: DISCONTINUED | OUTPATIENT
Start: 2020-01-01 | End: 2020-01-01

## 2020-01-01 RX ORDER — VANCOMYCIN HCL 1 G
750 VIAL (EA) INTRAVENOUS EVERY 24 HOURS
Refills: 0 | Status: DISCONTINUED | OUTPATIENT
Start: 2020-01-01 | End: 2020-01-01

## 2020-01-01 RX ORDER — CEFEPIME 1 G/1
INJECTION, POWDER, FOR SOLUTION INTRAMUSCULAR; INTRAVENOUS
Refills: 0 | Status: DISCONTINUED | OUTPATIENT
Start: 2020-01-01 | End: 2020-01-01

## 2020-01-01 RX ORDER — MORPHINE SULFATE 50 MG/1
16 CAPSULE, EXTENDED RELEASE ORAL
Refills: 0 | Status: DISCONTINUED | OUTPATIENT
Start: 2020-01-01 | End: 2020-01-01

## 2020-01-01 RX ORDER — OXYBUTYNIN CHLORIDE 5 MG
1 TABLET ORAL
Qty: 0 | Refills: 0 | DISCHARGE

## 2020-01-01 RX ORDER — FLUCONAZOLE 150 MG/1
200 TABLET ORAL
Refills: 0 | Status: DISCONTINUED | OUTPATIENT
Start: 2020-01-01 | End: 2020-01-01

## 2020-01-01 RX ADMIN — HEPARIN SODIUM 5000 UNIT(S): 5000 INJECTION INTRAVENOUS; SUBCUTANEOUS at 06:03

## 2020-01-01 RX ADMIN — SEVELAMER CARBONATE 800 MILLIGRAM(S): 2400 POWDER, FOR SUSPENSION ORAL at 11:49

## 2020-01-01 RX ADMIN — MORPHINE SULFATE 18 MG/HR: 50 CAPSULE, EXTENDED RELEASE ORAL at 19:24

## 2020-01-01 RX ADMIN — MORPHINE SULFATE 4 MILLIGRAM(S): 50 CAPSULE, EXTENDED RELEASE ORAL at 06:07

## 2020-01-01 RX ADMIN — Medication 25 MILLIGRAM(S): at 21:04

## 2020-01-01 RX ADMIN — CEFEPIME 100 MILLIGRAM(S): 1 INJECTION, POWDER, FOR SOLUTION INTRAMUSCULAR; INTRAVENOUS at 23:44

## 2020-01-01 RX ADMIN — OXYCODONE HYDROCHLORIDE 30 MILLIGRAM(S): 5 TABLET ORAL at 05:26

## 2020-01-01 RX ADMIN — OXYCODONE HYDROCHLORIDE 30 MILLIGRAM(S): 5 TABLET ORAL at 14:41

## 2020-01-01 RX ADMIN — OXYCODONE HYDROCHLORIDE 30 MILLIGRAM(S): 5 TABLET ORAL at 02:30

## 2020-01-01 RX ADMIN — Medication 650 MILLIGRAM(S): at 05:36

## 2020-01-01 RX ADMIN — HALOPERIDOL DECANOATE 2 MILLIGRAM(S): 100 INJECTION INTRAMUSCULAR at 21:02

## 2020-01-01 RX ADMIN — Medication 1 APPLICATION(S): at 05:46

## 2020-01-01 RX ADMIN — Medication 667 MILLIGRAM(S): at 12:02

## 2020-01-01 RX ADMIN — SEVELAMER CARBONATE 800 MILLIGRAM(S): 2400 POWDER, FOR SUSPENSION ORAL at 08:46

## 2020-01-01 RX ADMIN — MORPHINE SULFATE 4 MILLIGRAM(S): 50 CAPSULE, EXTENDED RELEASE ORAL at 01:43

## 2020-01-01 RX ADMIN — OXYCODONE HYDROCHLORIDE 30 MILLIGRAM(S): 5 TABLET ORAL at 23:15

## 2020-01-01 RX ADMIN — Medication 5 MILLIGRAM(S): at 07:25

## 2020-01-01 RX ADMIN — FLUCONAZOLE 100 MILLIGRAM(S): 150 TABLET ORAL at 22:11

## 2020-01-01 RX ADMIN — Medication 1 APPLICATION(S): at 17:50

## 2020-01-01 RX ADMIN — MIDODRINE HYDROCHLORIDE 2.5 MILLIGRAM(S): 2.5 TABLET ORAL at 05:21

## 2020-01-01 RX ADMIN — Medication 1300 MILLIGRAM(S): at 13:39

## 2020-01-01 RX ADMIN — CHLORHEXIDINE GLUCONATE 1 APPLICATION(S): 213 SOLUTION TOPICAL at 05:55

## 2020-01-01 RX ADMIN — MORPHINE SULFATE 10 MG/HR: 50 CAPSULE, EXTENDED RELEASE ORAL at 07:19

## 2020-01-01 RX ADMIN — Medication 10 MILLIGRAM(S): at 17:18

## 2020-01-01 RX ADMIN — MORPHINE SULFATE 18 MILLIGRAM(S): 50 CAPSULE, EXTENDED RELEASE ORAL at 18:07

## 2020-01-01 RX ADMIN — Medication 2.5 MILLIGRAM(S): at 05:25

## 2020-01-01 RX ADMIN — OXYCODONE HYDROCHLORIDE 30 MILLIGRAM(S): 5 TABLET ORAL at 01:15

## 2020-01-01 RX ADMIN — Medication 10 MILLIGRAM(S): at 05:10

## 2020-01-01 RX ADMIN — METHADONE HYDROCHLORIDE 80 MILLIGRAM(S): 40 TABLET ORAL at 13:38

## 2020-01-01 RX ADMIN — SEVELAMER CARBONATE 800 MILLIGRAM(S): 2400 POWDER, FOR SUSPENSION ORAL at 11:46

## 2020-01-01 RX ADMIN — MORPHINE SULFATE 16 MILLIGRAM(S): 50 CAPSULE, EXTENDED RELEASE ORAL at 12:32

## 2020-01-01 RX ADMIN — MORPHINE SULFATE 10 MILLIGRAM(S): 50 CAPSULE, EXTENDED RELEASE ORAL at 03:31

## 2020-01-01 RX ADMIN — MORPHINE SULFATE 10 MILLIGRAM(S): 50 CAPSULE, EXTENDED RELEASE ORAL at 22:44

## 2020-01-01 RX ADMIN — OXYCODONE HYDROCHLORIDE 20 MILLIGRAM(S): 5 TABLET ORAL at 10:22

## 2020-01-01 RX ADMIN — Medication 2.5 MILLIGRAM(S): at 05:32

## 2020-01-01 RX ADMIN — Medication 1 APPLICATION(S): at 05:35

## 2020-01-01 RX ADMIN — METHADONE HYDROCHLORIDE 80 MILLIGRAM(S): 40 TABLET ORAL at 21:59

## 2020-01-01 RX ADMIN — MORPHINE SULFATE 10 MILLIGRAM(S): 50 CAPSULE, EXTENDED RELEASE ORAL at 20:31

## 2020-01-01 RX ADMIN — FLUCONAZOLE 50 MILLIGRAM(S): 150 TABLET ORAL at 05:12

## 2020-01-01 RX ADMIN — Medication 667 MILLIGRAM(S): at 08:16

## 2020-01-01 RX ADMIN — MORPHINE SULFATE 18 MILLIGRAM(S): 50 CAPSULE, EXTENDED RELEASE ORAL at 21:35

## 2020-01-01 RX ADMIN — OXYCODONE HYDROCHLORIDE 30 MILLIGRAM(S): 5 TABLET ORAL at 01:56

## 2020-01-01 RX ADMIN — OXYCODONE HYDROCHLORIDE 30 MILLIGRAM(S): 5 TABLET ORAL at 21:08

## 2020-01-01 RX ADMIN — Medication 1 MILLIGRAM(S): at 18:23

## 2020-01-01 RX ADMIN — OXYCODONE HYDROCHLORIDE 30 MILLIGRAM(S): 5 TABLET ORAL at 05:09

## 2020-01-01 RX ADMIN — Medication 5 MILLIGRAM(S): at 03:58

## 2020-01-01 RX ADMIN — MORPHINE SULFATE 14 MG/HR: 50 CAPSULE, EXTENDED RELEASE ORAL at 10:27

## 2020-01-01 RX ADMIN — MIDODRINE HYDROCHLORIDE 2.5 MILLIGRAM(S): 2.5 TABLET ORAL at 05:06

## 2020-01-01 RX ADMIN — Medication 25 MILLIGRAM(S): at 21:09

## 2020-01-01 RX ADMIN — Medication 100 MICROGRAM(S): at 05:04

## 2020-01-01 RX ADMIN — MORPHINE SULFATE 18 MG/HR: 50 CAPSULE, EXTENDED RELEASE ORAL at 23:37

## 2020-01-01 RX ADMIN — Medication 250 MILLIGRAM(S): at 05:04

## 2020-01-01 RX ADMIN — Medication 2000 UNIT(S): at 11:13

## 2020-01-01 RX ADMIN — Medication 10 MILLIGRAM(S): at 05:05

## 2020-01-01 RX ADMIN — Medication 1 APPLICATION(S): at 18:32

## 2020-01-01 RX ADMIN — MEGESTROL ACETATE 400 MILLIGRAM(S): 40 SUSPENSION ORAL at 11:15

## 2020-01-01 RX ADMIN — CEFEPIME 100 MILLIGRAM(S): 1 INJECTION, POWDER, FOR SOLUTION INTRAMUSCULAR; INTRAVENOUS at 11:41

## 2020-01-01 RX ADMIN — MORPHINE SULFATE 4 MILLIGRAM(S): 50 CAPSULE, EXTENDED RELEASE ORAL at 23:28

## 2020-01-01 RX ADMIN — MUPIROCIN 1 APPLICATION(S): 20 OINTMENT TOPICAL at 17:16

## 2020-01-01 RX ADMIN — Medication 20 MICROGRAM(S): at 15:31

## 2020-01-01 RX ADMIN — Medication 650 MILLIGRAM(S): at 12:09

## 2020-01-01 RX ADMIN — SEVELAMER CARBONATE 800 MILLIGRAM(S): 2400 POWDER, FOR SUSPENSION ORAL at 17:54

## 2020-01-01 RX ADMIN — Medication 100 MILLIGRAM(S): at 05:20

## 2020-01-01 RX ADMIN — MORPHINE SULFATE 18 MG/HR: 50 CAPSULE, EXTENDED RELEASE ORAL at 03:26

## 2020-01-01 RX ADMIN — OXYCODONE HYDROCHLORIDE 30 MILLIGRAM(S): 5 TABLET ORAL at 13:07

## 2020-01-01 RX ADMIN — Medication 100 MICROGRAM(S): at 05:39

## 2020-01-01 RX ADMIN — CEFEPIME 100 MILLIGRAM(S): 1 INJECTION, POWDER, FOR SOLUTION INTRAMUSCULAR; INTRAVENOUS at 13:24

## 2020-01-01 RX ADMIN — MORPHINE SULFATE 14 MG/HR: 50 CAPSULE, EXTENDED RELEASE ORAL at 08:36

## 2020-01-01 RX ADMIN — METHADONE HYDROCHLORIDE 80 MILLIGRAM(S): 40 TABLET ORAL at 05:53

## 2020-01-01 RX ADMIN — OXYCODONE HYDROCHLORIDE 20 MILLIGRAM(S): 5 TABLET ORAL at 05:26

## 2020-01-01 RX ADMIN — Medication 200 MILLIGRAM(S): at 11:43

## 2020-01-01 RX ADMIN — OXYCODONE HYDROCHLORIDE 30 MILLIGRAM(S): 5 TABLET ORAL at 11:23

## 2020-01-01 RX ADMIN — MIDODRINE HYDROCHLORIDE 2.5 MILLIGRAM(S): 2.5 TABLET ORAL at 06:02

## 2020-01-01 RX ADMIN — MORPHINE SULFATE 10 MILLIGRAM(S): 50 CAPSULE, EXTENDED RELEASE ORAL at 00:09

## 2020-01-01 RX ADMIN — Medication 650 MILLIGRAM(S): at 05:13

## 2020-01-01 RX ADMIN — Medication 10 MILLIGRAM(S): at 18:13

## 2020-01-01 RX ADMIN — FLUCONAZOLE 50 MILLIGRAM(S): 150 TABLET ORAL at 17:39

## 2020-01-01 RX ADMIN — MORPHINE SULFATE 16 MG/HR: 50 CAPSULE, EXTENDED RELEASE ORAL at 03:11

## 2020-01-01 RX ADMIN — MORPHINE SULFATE 10 MILLIGRAM(S): 50 CAPSULE, EXTENDED RELEASE ORAL at 04:27

## 2020-01-01 RX ADMIN — MIDODRINE HYDROCHLORIDE 2.5 MILLIGRAM(S): 2.5 TABLET ORAL at 11:45

## 2020-01-01 RX ADMIN — Medication 10 MILLIGRAM(S): at 05:25

## 2020-01-01 RX ADMIN — Medication 2.5 MILLIGRAM(S): at 19:17

## 2020-01-01 RX ADMIN — SCOPALAMINE 1 PATCH: 1 PATCH, EXTENDED RELEASE TRANSDERMAL at 11:36

## 2020-01-01 RX ADMIN — Medication 667 MILLIGRAM(S): at 18:44

## 2020-01-01 RX ADMIN — MORPHINE SULFATE 18 MILLIGRAM(S): 50 CAPSULE, EXTENDED RELEASE ORAL at 07:50

## 2020-01-01 RX ADMIN — OXYCODONE HYDROCHLORIDE 30 MILLIGRAM(S): 5 TABLET ORAL at 05:16

## 2020-01-01 RX ADMIN — Medication 100 MICROGRAM(S): at 05:44

## 2020-01-01 RX ADMIN — MORPHINE SULFATE 18 MG/HR: 50 CAPSULE, EXTENDED RELEASE ORAL at 13:30

## 2020-01-01 RX ADMIN — MIDODRINE HYDROCHLORIDE 2.5 MILLIGRAM(S): 2.5 TABLET ORAL at 17:07

## 2020-01-01 RX ADMIN — Medication 2 MILLIGRAM(S): at 02:44

## 2020-01-01 RX ADMIN — Medication 20 MILLIGRAM(S): at 05:04

## 2020-01-01 RX ADMIN — Medication 500 MILLILITER(S): at 05:40

## 2020-01-01 RX ADMIN — METHADONE HYDROCHLORIDE 80 MILLIGRAM(S): 40 TABLET ORAL at 21:37

## 2020-01-01 RX ADMIN — PANTOPRAZOLE SODIUM 40 MILLIGRAM(S): 20 TABLET, DELAYED RELEASE ORAL at 06:04

## 2020-01-01 RX ADMIN — Medication 25 MILLIGRAM(S): at 22:58

## 2020-01-01 RX ADMIN — OXYCODONE HYDROCHLORIDE 30 MILLIGRAM(S): 5 TABLET ORAL at 18:25

## 2020-01-01 RX ADMIN — Medication 667 MILLIGRAM(S): at 17:49

## 2020-01-01 RX ADMIN — METHADONE HYDROCHLORIDE 80 MILLIGRAM(S): 40 TABLET ORAL at 06:49

## 2020-01-01 RX ADMIN — MORPHINE SULFATE 18 MG/HR: 50 CAPSULE, EXTENDED RELEASE ORAL at 10:44

## 2020-01-01 RX ADMIN — Medication 1 MILLIGRAM(S): at 01:26

## 2020-01-01 RX ADMIN — Medication 1 APPLICATION(S): at 17:43

## 2020-01-01 RX ADMIN — SEVELAMER CARBONATE 800 MILLIGRAM(S): 2400 POWDER, FOR SUSPENSION ORAL at 18:45

## 2020-01-01 RX ADMIN — Medication 25 MILLIGRAM(S): at 21:53

## 2020-01-01 RX ADMIN — Medication 667 MILLIGRAM(S): at 11:35

## 2020-01-01 RX ADMIN — HEPARIN SODIUM 5000 UNIT(S): 5000 INJECTION INTRAVENOUS; SUBCUTANEOUS at 13:57

## 2020-01-01 RX ADMIN — MORPHINE SULFATE 18 MILLIGRAM(S): 50 CAPSULE, EXTENDED RELEASE ORAL at 15:33

## 2020-01-01 RX ADMIN — Medication 100 MILLIGRAM(S): at 13:40

## 2020-01-01 RX ADMIN — HEPARIN SODIUM 5000 UNIT(S): 5000 INJECTION INTRAVENOUS; SUBCUTANEOUS at 05:20

## 2020-01-01 RX ADMIN — Medication 1 MILLIGRAM(S): at 12:13

## 2020-01-01 RX ADMIN — SEVELAMER CARBONATE 800 MILLIGRAM(S): 2400 POWDER, FOR SUSPENSION ORAL at 17:49

## 2020-01-01 RX ADMIN — METHADONE HYDROCHLORIDE 60 MILLIGRAM(S): 40 TABLET ORAL at 13:12

## 2020-01-01 RX ADMIN — Medication 2000 UNIT(S): at 11:49

## 2020-01-01 RX ADMIN — METHADONE HYDROCHLORIDE 80 MILLIGRAM(S): 40 TABLET ORAL at 13:57

## 2020-01-01 RX ADMIN — Medication 500 MILLIGRAM(S): at 06:02

## 2020-01-01 RX ADMIN — HALOPERIDOL DECANOATE 2 MILLIGRAM(S): 100 INJECTION INTRAMUSCULAR at 20:14

## 2020-01-01 RX ADMIN — Medication 100 MILLIGRAM(S): at 05:33

## 2020-01-01 RX ADMIN — MIDODRINE HYDROCHLORIDE 2.5 MILLIGRAM(S): 2.5 TABLET ORAL at 17:05

## 2020-01-01 RX ADMIN — Medication 20 MILLIGRAM(S): at 05:12

## 2020-01-01 RX ADMIN — Medication 667 MILLIGRAM(S): at 11:43

## 2020-01-01 RX ADMIN — MORPHINE SULFATE 4 MILLIGRAM(S): 50 CAPSULE, EXTENDED RELEASE ORAL at 23:35

## 2020-01-01 RX ADMIN — Medication 100 MICROGRAM(S): at 05:03

## 2020-01-01 RX ADMIN — MIDODRINE HYDROCHLORIDE 2.5 MILLIGRAM(S): 2.5 TABLET ORAL at 11:48

## 2020-01-01 RX ADMIN — SODIUM CHLORIDE 100 MILLILITER(S): 9 INJECTION, SOLUTION INTRAVENOUS at 20:03

## 2020-01-01 RX ADMIN — MORPHINE SULFATE 10 MILLIGRAM(S): 50 CAPSULE, EXTENDED RELEASE ORAL at 05:52

## 2020-01-01 RX ADMIN — Medication 100 MICROGRAM(S): at 05:33

## 2020-01-01 RX ADMIN — HEPARIN SODIUM 5000 UNIT(S): 5000 INJECTION INTRAVENOUS; SUBCUTANEOUS at 06:15

## 2020-01-01 RX ADMIN — Medication 100 MICROGRAM(S): at 05:11

## 2020-01-01 RX ADMIN — SODIUM CHLORIDE 75 MILLILITER(S): 9 INJECTION, SOLUTION INTRAVENOUS at 02:48

## 2020-01-01 RX ADMIN — Medication 5 MILLIGRAM(S): at 09:53

## 2020-01-01 RX ADMIN — Medication 1300 MILLIGRAM(S): at 05:19

## 2020-01-01 RX ADMIN — Medication 10 MILLIGRAM(S): at 05:18

## 2020-01-01 RX ADMIN — Medication 1300 MILLIGRAM(S): at 21:04

## 2020-01-01 RX ADMIN — OXYCODONE HYDROCHLORIDE 30 MILLIGRAM(S): 5 TABLET ORAL at 23:05

## 2020-01-01 RX ADMIN — FLUCONAZOLE 50 MILLIGRAM(S): 150 TABLET ORAL at 18:52

## 2020-01-01 RX ADMIN — Medication 1 APPLICATION(S): at 05:20

## 2020-01-01 RX ADMIN — OXYCODONE HYDROCHLORIDE 30 MILLIGRAM(S): 5 TABLET ORAL at 11:07

## 2020-01-01 RX ADMIN — PANTOPRAZOLE SODIUM 40 MILLIGRAM(S): 20 TABLET, DELAYED RELEASE ORAL at 17:49

## 2020-01-01 RX ADMIN — Medication 250 MILLIGRAM(S): at 21:35

## 2020-01-01 RX ADMIN — Medication 110 MILLIGRAM(S): at 05:10

## 2020-01-01 RX ADMIN — Medication 110 MILLIGRAM(S): at 12:40

## 2020-01-01 RX ADMIN — Medication 10 MILLIGRAM(S): at 17:19

## 2020-01-01 RX ADMIN — SEVELAMER CARBONATE 800 MILLIGRAM(S): 2400 POWDER, FOR SUSPENSION ORAL at 12:06

## 2020-01-01 RX ADMIN — HEPARIN SODIUM 5000 UNIT(S): 5000 INJECTION INTRAVENOUS; SUBCUTANEOUS at 05:04

## 2020-01-01 RX ADMIN — MEGESTROL ACETATE 20 MILLIGRAM(S): 40 SUSPENSION ORAL at 06:02

## 2020-01-01 RX ADMIN — HEPARIN SODIUM 5000 UNIT(S): 5000 INJECTION INTRAVENOUS; SUBCUTANEOUS at 05:54

## 2020-01-01 RX ADMIN — Medication 2 MILLIGRAM(S): at 22:24

## 2020-01-01 RX ADMIN — Medication 100 MILLIGRAM(S): at 06:05

## 2020-01-01 RX ADMIN — Medication 1 APPLICATION(S): at 17:18

## 2020-01-01 RX ADMIN — Medication 20 MILLIGRAM(S): at 05:36

## 2020-01-01 RX ADMIN — Medication 100 MILLIGRAM(S): at 05:38

## 2020-01-01 RX ADMIN — HEPARIN SODIUM 5000 UNIT(S): 5000 INJECTION INTRAVENOUS; SUBCUTANEOUS at 17:20

## 2020-01-01 RX ADMIN — FLUCONAZOLE 50 MILLIGRAM(S): 150 TABLET ORAL at 07:02

## 2020-01-01 RX ADMIN — Medication 100 MILLIGRAM(S): at 14:48

## 2020-01-01 RX ADMIN — Medication 5 MILLIGRAM(S): at 05:30

## 2020-01-01 RX ADMIN — Medication 1334 MILLIGRAM(S): at 17:16

## 2020-01-01 RX ADMIN — CHLORHEXIDINE GLUCONATE 1 APPLICATION(S): 213 SOLUTION TOPICAL at 05:59

## 2020-01-01 RX ADMIN — Medication 5 MILLIGRAM(S): at 06:32

## 2020-01-01 RX ADMIN — Medication 667 MILLIGRAM(S): at 18:30

## 2020-01-01 RX ADMIN — Medication 20 MILLIGRAM(S): at 06:15

## 2020-01-01 RX ADMIN — Medication 10 MILLIGRAM(S): at 05:03

## 2020-01-01 RX ADMIN — Medication 2.5 MILLIGRAM(S): at 17:24

## 2020-01-01 RX ADMIN — DAPTOMYCIN 109.6 MILLIGRAM(S): 500 INJECTION, POWDER, LYOPHILIZED, FOR SOLUTION INTRAVENOUS at 05:11

## 2020-01-01 RX ADMIN — Medication 650 MILLIGRAM(S): at 06:04

## 2020-01-01 RX ADMIN — MORPHINE SULFATE 16 MILLIGRAM(S): 50 CAPSULE, EXTENDED RELEASE ORAL at 19:03

## 2020-01-01 RX ADMIN — MORPHINE SULFATE 10 MILLIGRAM(S): 50 CAPSULE, EXTENDED RELEASE ORAL at 14:47

## 2020-01-01 RX ADMIN — Medication 5 MILLIGRAM(S): at 11:40

## 2020-01-01 RX ADMIN — Medication 25 MILLIGRAM(S): at 21:06

## 2020-01-01 RX ADMIN — MORPHINE SULFATE 10 MILLIGRAM(S): 50 CAPSULE, EXTENDED RELEASE ORAL at 00:46

## 2020-01-01 RX ADMIN — Medication 2 MILLIGRAM(S): at 14:39

## 2020-01-01 RX ADMIN — Medication 250 MILLIGRAM(S): at 21:34

## 2020-01-01 RX ADMIN — OXYCODONE HYDROCHLORIDE 30 MILLIGRAM(S): 5 TABLET ORAL at 05:11

## 2020-01-01 RX ADMIN — HALOPERIDOL DECANOATE 2 MILLIGRAM(S): 100 INJECTION INTRAMUSCULAR at 20:33

## 2020-01-01 RX ADMIN — Medication 1300 MILLIGRAM(S): at 05:04

## 2020-01-01 RX ADMIN — Medication 100 MILLIGRAM(S): at 17:08

## 2020-01-01 RX ADMIN — Medication 2000 UNIT(S): at 12:37

## 2020-01-01 RX ADMIN — HEPARIN SODIUM 5000 UNIT(S): 5000 INJECTION INTRAVENOUS; SUBCUTANEOUS at 21:19

## 2020-01-01 RX ADMIN — OXYCODONE HYDROCHLORIDE 30 MILLIGRAM(S): 5 TABLET ORAL at 13:01

## 2020-01-01 RX ADMIN — OXYCODONE HYDROCHLORIDE 30 MILLIGRAM(S): 5 TABLET ORAL at 04:39

## 2020-01-01 RX ADMIN — Medication 10 MILLIGRAM(S): at 17:41

## 2020-01-01 RX ADMIN — Medication 1 APPLICATION(S): at 05:10

## 2020-01-01 RX ADMIN — SEVELAMER CARBONATE 800 MILLIGRAM(S): 2400 POWDER, FOR SUSPENSION ORAL at 11:15

## 2020-01-01 RX ADMIN — MORPHINE SULFATE 14 MG/HR: 50 CAPSULE, EXTENDED RELEASE ORAL at 00:46

## 2020-01-01 RX ADMIN — OXYCODONE HYDROCHLORIDE 30 MILLIGRAM(S): 5 TABLET ORAL at 11:51

## 2020-01-01 RX ADMIN — FLUCONAZOLE 50 MILLIGRAM(S): 150 TABLET ORAL at 05:21

## 2020-01-01 RX ADMIN — FLUCONAZOLE 50 MILLIGRAM(S): 150 TABLET ORAL at 17:17

## 2020-01-01 RX ADMIN — Medication 2.5 MILLIGRAM(S): at 05:39

## 2020-01-01 RX ADMIN — OXYCODONE HYDROCHLORIDE 30 MILLIGRAM(S): 5 TABLET ORAL at 08:42

## 2020-01-01 RX ADMIN — ONDANSETRON 4 MILLIGRAM(S): 8 TABLET, FILM COATED ORAL at 16:26

## 2020-01-01 RX ADMIN — Medication 1 MILLIGRAM(S): at 17:34

## 2020-01-01 RX ADMIN — Medication 2000 UNIT(S): at 11:28

## 2020-01-01 RX ADMIN — ONDANSETRON 4 MILLIGRAM(S): 8 TABLET, FILM COATED ORAL at 08:59

## 2020-01-01 RX ADMIN — OXYCODONE HYDROCHLORIDE 30 MILLIGRAM(S): 5 TABLET ORAL at 05:05

## 2020-01-01 RX ADMIN — PANTOPRAZOLE SODIUM 40 MILLIGRAM(S): 20 TABLET, DELAYED RELEASE ORAL at 16:27

## 2020-01-01 RX ADMIN — Medication 1 APPLICATION(S): at 06:33

## 2020-01-01 RX ADMIN — Medication 650 MILLIGRAM(S): at 12:58

## 2020-01-01 RX ADMIN — Medication 1 APPLICATION(S): at 17:47

## 2020-01-01 RX ADMIN — Medication 2000 UNIT(S): at 11:03

## 2020-01-01 RX ADMIN — SODIUM CHLORIDE 75 MILLILITER(S): 9 INJECTION INTRAMUSCULAR; INTRAVENOUS; SUBCUTANEOUS at 21:57

## 2020-01-01 RX ADMIN — Medication 1 APPLICATION(S): at 17:03

## 2020-01-01 RX ADMIN — Medication 500 MILLIGRAM(S): at 19:04

## 2020-01-01 RX ADMIN — Medication 10 MILLIGRAM(S): at 17:24

## 2020-01-01 RX ADMIN — Medication 667 MILLIGRAM(S): at 08:42

## 2020-01-01 RX ADMIN — Medication 25 MILLIGRAM(S): at 21:03

## 2020-01-01 RX ADMIN — OXYCODONE HYDROCHLORIDE 30 MILLIGRAM(S): 5 TABLET ORAL at 18:42

## 2020-01-01 RX ADMIN — MORPHINE SULFATE 16 MILLIGRAM(S): 50 CAPSULE, EXTENDED RELEASE ORAL at 05:55

## 2020-01-01 RX ADMIN — Medication 650 MILLIGRAM(S): at 23:28

## 2020-01-01 RX ADMIN — METHADONE HYDROCHLORIDE 80 MILLIGRAM(S): 40 TABLET ORAL at 13:47

## 2020-01-01 RX ADMIN — MORPHINE SULFATE 16 MG/HR: 50 CAPSULE, EXTENDED RELEASE ORAL at 20:37

## 2020-01-01 RX ADMIN — MORPHINE SULFATE 10 MG/HR: 50 CAPSULE, EXTENDED RELEASE ORAL at 05:50

## 2020-01-01 RX ADMIN — Medication 1 MILLIGRAM(S): at 03:53

## 2020-01-01 RX ADMIN — MIDODRINE HYDROCHLORIDE 2.5 MILLIGRAM(S): 2.5 TABLET ORAL at 05:39

## 2020-01-01 RX ADMIN — Medication 1 APPLICATION(S): at 12:17

## 2020-01-01 RX ADMIN — METHADONE HYDROCHLORIDE 80 MILLIGRAM(S): 40 TABLET ORAL at 21:22

## 2020-01-01 RX ADMIN — Medication 10 MILLIGRAM(S): at 17:12

## 2020-01-01 RX ADMIN — Medication 1300 MILLIGRAM(S): at 06:02

## 2020-01-01 RX ADMIN — METHADONE HYDROCHLORIDE 80 MILLIGRAM(S): 40 TABLET ORAL at 05:16

## 2020-01-01 RX ADMIN — MUPIROCIN 1 APPLICATION(S): 20 OINTMENT TOPICAL at 18:07

## 2020-01-01 RX ADMIN — ONDANSETRON 4 MILLIGRAM(S): 8 TABLET, FILM COATED ORAL at 00:25

## 2020-01-01 RX ADMIN — Medication 100 MILLIGRAM(S): at 05:17

## 2020-01-01 RX ADMIN — Medication 100 MICROGRAM(S): at 05:18

## 2020-01-01 RX ADMIN — MEGESTROL ACETATE 20 MILLIGRAM(S): 40 SUSPENSION ORAL at 21:04

## 2020-01-01 RX ADMIN — MEGESTROL ACETATE 20 MILLIGRAM(S): 40 SUSPENSION ORAL at 05:04

## 2020-01-01 RX ADMIN — Medication 5 MILLIGRAM(S): at 10:11

## 2020-01-01 RX ADMIN — Medication 5 MILLIGRAM(S): at 21:16

## 2020-01-01 RX ADMIN — HEPARIN SODIUM 5000 UNIT(S): 5000 INJECTION INTRAVENOUS; SUBCUTANEOUS at 17:06

## 2020-01-01 RX ADMIN — Medication 10 MILLIGRAM(S): at 17:17

## 2020-01-01 RX ADMIN — OXYCODONE HYDROCHLORIDE 30 MILLIGRAM(S): 5 TABLET ORAL at 12:09

## 2020-01-01 RX ADMIN — SCOPALAMINE 1 PATCH: 1 PATCH, EXTENDED RELEASE TRANSDERMAL at 06:36

## 2020-01-01 RX ADMIN — SEVELAMER CARBONATE 800 MILLIGRAM(S): 2400 POWDER, FOR SUSPENSION ORAL at 17:43

## 2020-01-01 RX ADMIN — MORPHINE SULFATE 18 MILLIGRAM(S): 50 CAPSULE, EXTENDED RELEASE ORAL at 15:15

## 2020-01-01 RX ADMIN — OXYCODONE HYDROCHLORIDE 30 MILLIGRAM(S): 5 TABLET ORAL at 21:01

## 2020-01-01 RX ADMIN — OXYCODONE HYDROCHLORIDE 30 MILLIGRAM(S): 5 TABLET ORAL at 12:46

## 2020-01-01 RX ADMIN — SODIUM CHLORIDE 75 MILLILITER(S): 9 INJECTION, SOLUTION INTRAVENOUS at 19:23

## 2020-01-01 RX ADMIN — Medication 650 MILLIGRAM(S): at 06:40

## 2020-01-01 RX ADMIN — MORPHINE SULFATE 10 MILLIGRAM(S): 50 CAPSULE, EXTENDED RELEASE ORAL at 02:43

## 2020-01-01 RX ADMIN — OXYCODONE HYDROCHLORIDE 30 MILLIGRAM(S): 5 TABLET ORAL at 20:27

## 2020-01-01 RX ADMIN — Medication 650 MILLIGRAM(S): at 01:32

## 2020-01-01 RX ADMIN — METHADONE HYDROCHLORIDE 80 MILLIGRAM(S): 40 TABLET ORAL at 05:03

## 2020-01-01 RX ADMIN — Medication 100 MILLIGRAM(S): at 05:12

## 2020-01-01 RX ADMIN — OXYCODONE HYDROCHLORIDE 30 MILLIGRAM(S): 5 TABLET ORAL at 19:47

## 2020-01-01 RX ADMIN — Medication 5 MILLIGRAM(S): at 18:42

## 2020-01-01 RX ADMIN — CEFEPIME 100 MILLIGRAM(S): 1 INJECTION, POWDER, FOR SOLUTION INTRAMUSCULAR; INTRAVENOUS at 14:47

## 2020-01-01 RX ADMIN — OXYCODONE HYDROCHLORIDE 30 MILLIGRAM(S): 5 TABLET ORAL at 23:40

## 2020-01-01 RX ADMIN — MORPHINE SULFATE 10 MILLIGRAM(S): 50 CAPSULE, EXTENDED RELEASE ORAL at 22:39

## 2020-01-01 RX ADMIN — OXYCODONE HYDROCHLORIDE 20 MILLIGRAM(S): 5 TABLET ORAL at 05:06

## 2020-01-01 RX ADMIN — MORPHINE SULFATE 10 MG/HR: 50 CAPSULE, EXTENDED RELEASE ORAL at 09:38

## 2020-01-01 RX ADMIN — OXYCODONE HYDROCHLORIDE 30 MILLIGRAM(S): 5 TABLET ORAL at 14:15

## 2020-01-01 RX ADMIN — Medication 2 MILLIGRAM(S): at 10:11

## 2020-01-01 RX ADMIN — Medication 650 MILLIGRAM(S): at 00:19

## 2020-01-01 RX ADMIN — Medication 667 MILLIGRAM(S): at 08:19

## 2020-01-01 RX ADMIN — MORPHINE SULFATE 4 MILLIGRAM(S): 50 CAPSULE, EXTENDED RELEASE ORAL at 04:49

## 2020-01-01 RX ADMIN — OXYCODONE HYDROCHLORIDE 30 MILLIGRAM(S): 5 TABLET ORAL at 13:50

## 2020-01-01 RX ADMIN — Medication 10 MILLIGRAM(S): at 06:02

## 2020-01-01 RX ADMIN — SODIUM CHLORIDE 75 MILLILITER(S): 9 INJECTION, SOLUTION INTRAVENOUS at 09:42

## 2020-01-01 RX ADMIN — Medication 1 MILLIGRAM(S): at 12:21

## 2020-01-01 RX ADMIN — MEGESTROL ACETATE 20 MILLIGRAM(S): 40 SUSPENSION ORAL at 13:51

## 2020-01-01 RX ADMIN — CHLORHEXIDINE GLUCONATE 1 APPLICATION(S): 213 SOLUTION TOPICAL at 05:36

## 2020-01-01 RX ADMIN — Medication 2.5 MILLIGRAM(S): at 17:17

## 2020-01-01 RX ADMIN — HEPARIN SODIUM 5000 UNIT(S): 5000 INJECTION INTRAVENOUS; SUBCUTANEOUS at 17:10

## 2020-01-01 RX ADMIN — Medication 100 MICROGRAM(S): at 06:02

## 2020-01-01 RX ADMIN — OXYCODONE HYDROCHLORIDE 30 MILLIGRAM(S): 5 TABLET ORAL at 06:28

## 2020-01-01 RX ADMIN — SODIUM CHLORIDE 75 MILLILITER(S): 9 INJECTION INTRAMUSCULAR; INTRAVENOUS; SUBCUTANEOUS at 05:15

## 2020-01-01 RX ADMIN — Medication 20 MILLIGRAM(S): at 06:16

## 2020-01-01 RX ADMIN — SODIUM CHLORIDE 1500 MILLILITER(S): 9 INJECTION INTRAMUSCULAR; INTRAVENOUS; SUBCUTANEOUS at 09:56

## 2020-01-01 RX ADMIN — MIDODRINE HYDROCHLORIDE 2.5 MILLIGRAM(S): 2.5 TABLET ORAL at 05:10

## 2020-01-01 RX ADMIN — Medication 650 MILLIGRAM(S): at 17:47

## 2020-01-01 RX ADMIN — OXYCODONE HYDROCHLORIDE 30 MILLIGRAM(S): 5 TABLET ORAL at 18:12

## 2020-01-01 RX ADMIN — Medication 10 MILLIGRAM(S): at 05:33

## 2020-01-01 RX ADMIN — Medication 5 MILLIGRAM(S): at 21:57

## 2020-01-01 RX ADMIN — MORPHINE SULFATE 10 MILLIGRAM(S): 50 CAPSULE, EXTENDED RELEASE ORAL at 20:33

## 2020-01-01 RX ADMIN — Medication 650 MILLIGRAM(S): at 18:13

## 2020-01-01 RX ADMIN — Medication 1 APPLICATION(S): at 05:59

## 2020-01-01 RX ADMIN — METHADONE HYDROCHLORIDE 80 MILLIGRAM(S): 40 TABLET ORAL at 13:19

## 2020-01-01 RX ADMIN — FLUCONAZOLE 100 MILLIGRAM(S): 150 TABLET ORAL at 08:26

## 2020-01-01 RX ADMIN — MORPHINE SULFATE 2 MILLIGRAM(S): 50 CAPSULE, EXTENDED RELEASE ORAL at 20:28

## 2020-01-01 RX ADMIN — OXYCODONE HYDROCHLORIDE 30 MILLIGRAM(S): 5 TABLET ORAL at 21:07

## 2020-01-01 RX ADMIN — Medication 25 MILLIGRAM(S): at 21:59

## 2020-01-01 RX ADMIN — Medication 1 APPLICATION(S): at 17:19

## 2020-01-01 RX ADMIN — Medication 1 APPLICATION(S): at 17:20

## 2020-01-01 RX ADMIN — MORPHINE SULFATE 18 MG/HR: 50 CAPSULE, EXTENDED RELEASE ORAL at 04:46

## 2020-01-01 RX ADMIN — Medication 1300 MILLIGRAM(S): at 13:08

## 2020-01-01 RX ADMIN — Medication 50 GRAM(S): at 18:54

## 2020-01-01 RX ADMIN — Medication 100 MILLIGRAM(S): at 21:03

## 2020-01-01 RX ADMIN — Medication 5 MILLIGRAM(S): at 07:09

## 2020-01-01 RX ADMIN — OXYCODONE HYDROCHLORIDE 30 MILLIGRAM(S): 5 TABLET ORAL at 17:54

## 2020-01-01 RX ADMIN — MORPHINE SULFATE 18 MG/HR: 50 CAPSULE, EXTENDED RELEASE ORAL at 04:51

## 2020-01-01 RX ADMIN — SCOPALAMINE 1 PATCH: 1 PATCH, EXTENDED RELEASE TRANSDERMAL at 11:31

## 2020-01-01 RX ADMIN — Medication 1334 MILLIGRAM(S): at 11:48

## 2020-01-01 RX ADMIN — MORPHINE SULFATE 2 MILLIGRAM(S): 50 CAPSULE, EXTENDED RELEASE ORAL at 08:02

## 2020-01-01 RX ADMIN — Medication 2 MILLIGRAM(S): at 06:17

## 2020-01-01 RX ADMIN — Medication 1 APPLICATION(S): at 01:33

## 2020-01-01 RX ADMIN — Medication 1 MILLIGRAM(S): at 18:47

## 2020-01-01 RX ADMIN — MORPHINE SULFATE 10 MILLIGRAM(S): 50 CAPSULE, EXTENDED RELEASE ORAL at 05:58

## 2020-01-01 RX ADMIN — MORPHINE SULFATE 18 MG/HR: 50 CAPSULE, EXTENDED RELEASE ORAL at 08:11

## 2020-01-01 RX ADMIN — Medication 650 MILLIGRAM(S): at 05:03

## 2020-01-01 RX ADMIN — Medication 1300 MILLIGRAM(S): at 07:18

## 2020-01-01 RX ADMIN — METHADONE HYDROCHLORIDE 80 MILLIGRAM(S): 40 TABLET ORAL at 21:24

## 2020-01-01 RX ADMIN — METHADONE HYDROCHLORIDE 80 MILLIGRAM(S): 40 TABLET ORAL at 06:04

## 2020-01-01 RX ADMIN — OXYCODONE HYDROCHLORIDE 20 MILLIGRAM(S): 5 TABLET ORAL at 05:51

## 2020-01-01 RX ADMIN — HEPARIN SODIUM 5000 UNIT(S): 5000 INJECTION INTRAVENOUS; SUBCUTANEOUS at 13:47

## 2020-01-01 RX ADMIN — Medication 25 MILLIGRAM(S): at 21:34

## 2020-01-01 RX ADMIN — MORPHINE SULFATE 18 MILLIGRAM(S): 50 CAPSULE, EXTENDED RELEASE ORAL at 05:53

## 2020-01-01 RX ADMIN — Medication 1300 MILLIGRAM(S): at 13:28

## 2020-01-01 RX ADMIN — HEPARIN SODIUM 5000 UNIT(S): 5000 INJECTION INTRAVENOUS; SUBCUTANEOUS at 05:37

## 2020-01-01 RX ADMIN — OXYCODONE HYDROCHLORIDE 30 MILLIGRAM(S): 5 TABLET ORAL at 22:39

## 2020-01-01 RX ADMIN — SODIUM CHLORIDE 75 MILLILITER(S): 9 INJECTION, SOLUTION INTRAVENOUS at 05:54

## 2020-01-01 RX ADMIN — Medication 2 MILLIGRAM(S): at 06:08

## 2020-01-01 RX ADMIN — Medication 10 MILLIGRAM(S): at 06:42

## 2020-01-01 RX ADMIN — Medication 2000 UNIT(S): at 12:01

## 2020-01-01 RX ADMIN — OXYCODONE HYDROCHLORIDE 30 MILLIGRAM(S): 5 TABLET ORAL at 21:23

## 2020-01-01 RX ADMIN — SCOPALAMINE 1 PATCH: 1 PATCH, EXTENDED RELEASE TRANSDERMAL at 11:32

## 2020-01-01 RX ADMIN — Medication 2.5 MILLIGRAM(S): at 17:10

## 2020-01-01 RX ADMIN — MIDODRINE HYDROCHLORIDE 2.5 MILLIGRAM(S): 2.5 TABLET ORAL at 05:11

## 2020-01-01 RX ADMIN — Medication 25 MILLIGRAM(S): at 21:35

## 2020-01-01 RX ADMIN — Medication 1000 MILLILITER(S): at 12:39

## 2020-01-01 RX ADMIN — MIDODRINE HYDROCHLORIDE 2.5 MILLIGRAM(S): 2.5 TABLET ORAL at 05:04

## 2020-01-01 RX ADMIN — METHADONE HYDROCHLORIDE 80 MILLIGRAM(S): 40 TABLET ORAL at 21:35

## 2020-01-01 RX ADMIN — Medication 5 MILLIGRAM(S): at 23:28

## 2020-01-01 RX ADMIN — HEPARIN SODIUM 5000 UNIT(S): 5000 INJECTION INTRAVENOUS; SUBCUTANEOUS at 05:26

## 2020-01-01 RX ADMIN — OXYCODONE HYDROCHLORIDE 30 MILLIGRAM(S): 5 TABLET ORAL at 06:46

## 2020-01-01 RX ADMIN — HEPARIN SODIUM 5000 UNIT(S): 5000 INJECTION INTRAVENOUS; SUBCUTANEOUS at 17:42

## 2020-01-01 RX ADMIN — Medication 1300 MILLIGRAM(S): at 13:11

## 2020-01-01 RX ADMIN — METHADONE HYDROCHLORIDE 60 MILLIGRAM(S): 40 TABLET ORAL at 05:39

## 2020-01-01 RX ADMIN — MORPHINE SULFATE 16 MILLIGRAM(S): 50 CAPSULE, EXTENDED RELEASE ORAL at 23:39

## 2020-01-01 RX ADMIN — METHADONE HYDROCHLORIDE 80 MILLIGRAM(S): 40 TABLET ORAL at 13:49

## 2020-01-01 RX ADMIN — Medication 650 MILLIGRAM(S): at 21:21

## 2020-01-01 RX ADMIN — MORPHINE SULFATE 16 MILLIGRAM(S): 50 CAPSULE, EXTENDED RELEASE ORAL at 03:43

## 2020-01-01 RX ADMIN — Medication 250 MILLIGRAM(S): at 22:38

## 2020-01-01 RX ADMIN — NYSTATIN CREAM 1 APPLICATION(S): 100000 CREAM TOPICAL at 06:07

## 2020-01-01 RX ADMIN — MORPHINE SULFATE 18 MILLIGRAM(S): 50 CAPSULE, EXTENDED RELEASE ORAL at 04:29

## 2020-01-01 RX ADMIN — Medication 2.5 MILLIGRAM(S): at 05:33

## 2020-01-01 RX ADMIN — MORPHINE SULFATE 10 MILLIGRAM(S): 50 CAPSULE, EXTENDED RELEASE ORAL at 20:38

## 2020-01-01 RX ADMIN — OXYCODONE HYDROCHLORIDE 30 MILLIGRAM(S): 5 TABLET ORAL at 02:32

## 2020-01-01 RX ADMIN — OXYCODONE HYDROCHLORIDE 30 MILLIGRAM(S): 5 TABLET ORAL at 15:31

## 2020-01-01 RX ADMIN — SODIUM CHLORIDE 50 MILLILITER(S): 9 INJECTION, SOLUTION INTRAVENOUS at 11:35

## 2020-01-01 RX ADMIN — MIDODRINE HYDROCHLORIDE 2.5 MILLIGRAM(S): 2.5 TABLET ORAL at 06:03

## 2020-01-01 RX ADMIN — SEVELAMER CARBONATE 800 MILLIGRAM(S): 2400 POWDER, FOR SUSPENSION ORAL at 08:43

## 2020-01-01 RX ADMIN — Medication 5 MILLIGRAM(S): at 00:39

## 2020-01-01 RX ADMIN — Medication 100 MICROGRAM(S): at 05:25

## 2020-01-01 RX ADMIN — Medication 1300 MILLIGRAM(S): at 21:26

## 2020-01-01 RX ADMIN — OXYCODONE HYDROCHLORIDE 30 MILLIGRAM(S): 5 TABLET ORAL at 21:16

## 2020-01-01 RX ADMIN — Medication 100 MICROGRAM(S): at 05:32

## 2020-01-01 RX ADMIN — Medication 100 MILLIGRAM(S): at 13:23

## 2020-01-01 RX ADMIN — Medication 1300 MILLIGRAM(S): at 05:05

## 2020-01-01 RX ADMIN — Medication 1334 MILLIGRAM(S): at 16:58

## 2020-01-01 RX ADMIN — METHADONE HYDROCHLORIDE 80 MILLIGRAM(S): 40 TABLET ORAL at 21:02

## 2020-01-01 RX ADMIN — OXYCODONE HYDROCHLORIDE 20 MILLIGRAM(S): 5 TABLET ORAL at 18:12

## 2020-01-01 RX ADMIN — OXYCODONE HYDROCHLORIDE 30 MILLIGRAM(S): 5 TABLET ORAL at 11:30

## 2020-01-01 RX ADMIN — MORPHINE SULFATE 18 MG/HR: 50 CAPSULE, EXTENDED RELEASE ORAL at 21:18

## 2020-01-01 RX ADMIN — Medication 250 MILLIGRAM(S): at 13:38

## 2020-01-01 RX ADMIN — MORPHINE SULFATE 18 MG/HR: 50 CAPSULE, EXTENDED RELEASE ORAL at 18:28

## 2020-01-01 RX ADMIN — MORPHINE SULFATE 4 MILLIGRAM(S): 50 CAPSULE, EXTENDED RELEASE ORAL at 07:18

## 2020-01-01 RX ADMIN — MEGESTROL ACETATE 20 MILLIGRAM(S): 40 SUSPENSION ORAL at 21:27

## 2020-01-01 RX ADMIN — MORPHINE SULFATE 2 MILLIGRAM(S): 50 CAPSULE, EXTENDED RELEASE ORAL at 23:31

## 2020-01-01 RX ADMIN — MORPHINE SULFATE 10 MILLIGRAM(S): 50 CAPSULE, EXTENDED RELEASE ORAL at 01:48

## 2020-01-01 RX ADMIN — Medication 5 MILLIGRAM(S): at 00:19

## 2020-01-01 RX ADMIN — MIDODRINE HYDROCHLORIDE 2.5 MILLIGRAM(S): 2.5 TABLET ORAL at 18:02

## 2020-01-01 RX ADMIN — OXYCODONE HYDROCHLORIDE 30 MILLIGRAM(S): 5 TABLET ORAL at 14:55

## 2020-01-01 RX ADMIN — OXYCODONE HYDROCHLORIDE 20 MILLIGRAM(S): 5 TABLET ORAL at 18:52

## 2020-01-01 RX ADMIN — Medication 1334 MILLIGRAM(S): at 17:54

## 2020-01-01 RX ADMIN — Medication 20 MILLIGRAM(S): at 05:25

## 2020-01-01 RX ADMIN — METHADONE HYDROCHLORIDE 80 MILLIGRAM(S): 40 TABLET ORAL at 15:53

## 2020-01-01 RX ADMIN — SODIUM CHLORIDE 75 MILLILITER(S): 9 INJECTION, SOLUTION INTRAVENOUS at 18:15

## 2020-01-01 RX ADMIN — Medication 2000 UNIT(S): at 12:04

## 2020-01-01 RX ADMIN — MEGESTROL ACETATE 20 MILLIGRAM(S): 40 SUSPENSION ORAL at 13:29

## 2020-01-01 RX ADMIN — Medication 5 MILLIGRAM(S): at 16:08

## 2020-01-01 RX ADMIN — MORPHINE SULFATE 16 MG/HR: 50 CAPSULE, EXTENDED RELEASE ORAL at 03:15

## 2020-01-01 RX ADMIN — CHLORHEXIDINE GLUCONATE 1 APPLICATION(S): 213 SOLUTION TOPICAL at 06:05

## 2020-01-01 RX ADMIN — OXYCODONE HYDROCHLORIDE 30 MILLIGRAM(S): 5 TABLET ORAL at 14:28

## 2020-01-01 RX ADMIN — SEVELAMER CARBONATE 800 MILLIGRAM(S): 2400 POWDER, FOR SUSPENSION ORAL at 11:43

## 2020-01-01 RX ADMIN — Medication 250 MILLILITER(S): at 10:41

## 2020-01-01 RX ADMIN — MORPHINE SULFATE 10 MILLIGRAM(S): 50 CAPSULE, EXTENDED RELEASE ORAL at 08:48

## 2020-01-01 RX ADMIN — MORPHINE SULFATE 16 MG/HR: 50 CAPSULE, EXTENDED RELEASE ORAL at 09:42

## 2020-01-01 RX ADMIN — METHADONE HYDROCHLORIDE 80 MILLIGRAM(S): 40 TABLET ORAL at 16:40

## 2020-01-01 RX ADMIN — Medication 1300 MILLIGRAM(S): at 13:19

## 2020-01-01 RX ADMIN — MORPHINE SULFATE 4 MILLIGRAM(S): 50 CAPSULE, EXTENDED RELEASE ORAL at 14:22

## 2020-01-01 RX ADMIN — Medication 5 MILLIGRAM(S): at 10:48

## 2020-01-01 RX ADMIN — MIDODRINE HYDROCHLORIDE 2.5 MILLIGRAM(S): 2.5 TABLET ORAL at 17:19

## 2020-01-01 RX ADMIN — METHADONE HYDROCHLORIDE 60 MILLIGRAM(S): 40 TABLET ORAL at 09:41

## 2020-01-01 RX ADMIN — OXYCODONE AND ACETAMINOPHEN 1 TABLET(S): 5; 325 TABLET ORAL at 05:00

## 2020-01-01 RX ADMIN — CHLORHEXIDINE GLUCONATE 1 APPLICATION(S): 213 SOLUTION TOPICAL at 06:15

## 2020-01-01 RX ADMIN — Medication 2 MILLIGRAM(S): at 14:09

## 2020-01-01 RX ADMIN — PANTOPRAZOLE SODIUM 40 MILLIGRAM(S): 20 TABLET, DELAYED RELEASE ORAL at 05:25

## 2020-01-01 RX ADMIN — Medication 20 MILLIGRAM(S): at 06:04

## 2020-01-01 RX ADMIN — SEVELAMER CARBONATE 800 MILLIGRAM(S): 2400 POWDER, FOR SUSPENSION ORAL at 08:13

## 2020-01-01 RX ADMIN — Medication 0.5 MILLIGRAM(S): at 14:22

## 2020-01-01 RX ADMIN — Medication 100 MICROGRAM(S): at 06:04

## 2020-01-01 RX ADMIN — OXYCODONE HYDROCHLORIDE 30 MILLIGRAM(S): 5 TABLET ORAL at 19:53

## 2020-01-01 RX ADMIN — SEVELAMER CARBONATE 800 MILLIGRAM(S): 2400 POWDER, FOR SUSPENSION ORAL at 08:42

## 2020-01-01 RX ADMIN — HALOPERIDOL DECANOATE 5 MILLIGRAM(S): 100 INJECTION INTRAMUSCULAR at 06:16

## 2020-01-01 RX ADMIN — OXYCODONE HYDROCHLORIDE 30 MILLIGRAM(S): 5 TABLET ORAL at 19:44

## 2020-01-01 RX ADMIN — TRAMADOL HYDROCHLORIDE 50 MILLIGRAM(S): 50 TABLET ORAL at 11:34

## 2020-01-01 RX ADMIN — Medication 1334 MILLIGRAM(S): at 11:46

## 2020-01-01 RX ADMIN — MORPHINE SULFATE 10 MILLIGRAM(S): 50 CAPSULE, EXTENDED RELEASE ORAL at 15:26

## 2020-01-01 RX ADMIN — Medication 2000 UNIT(S): at 12:51

## 2020-01-01 RX ADMIN — FLUCONAZOLE 50 MILLIGRAM(S): 150 TABLET ORAL at 18:44

## 2020-01-01 RX ADMIN — Medication 10 MILLIGRAM(S): at 19:04

## 2020-01-01 RX ADMIN — Medication 650 MILLIGRAM(S): at 18:44

## 2020-01-01 RX ADMIN — Medication 2000 UNIT(S): at 11:45

## 2020-01-01 RX ADMIN — METHADONE HYDROCHLORIDE 80 MILLIGRAM(S): 40 TABLET ORAL at 13:28

## 2020-01-01 RX ADMIN — Medication 1 APPLICATION(S): at 16:34

## 2020-01-01 RX ADMIN — MORPHINE SULFATE 18 MILLIGRAM(S): 50 CAPSULE, EXTENDED RELEASE ORAL at 12:07

## 2020-01-01 RX ADMIN — Medication 250 MILLIGRAM(S): at 11:24

## 2020-01-01 RX ADMIN — METHADONE HYDROCHLORIDE 80 MILLIGRAM(S): 40 TABLET ORAL at 05:19

## 2020-01-01 RX ADMIN — Medication 1 MILLIGRAM(S): at 23:49

## 2020-01-01 RX ADMIN — Medication 1 APPLICATION(S): at 16:57

## 2020-01-01 RX ADMIN — Medication 100 MICROGRAM(S): at 06:15

## 2020-01-01 RX ADMIN — OXYCODONE HYDROCHLORIDE 30 MILLIGRAM(S): 5 TABLET ORAL at 18:48

## 2020-01-01 RX ADMIN — Medication 667 MILLIGRAM(S): at 18:13

## 2020-01-01 RX ADMIN — Medication 650 MILLIGRAM(S): at 18:25

## 2020-01-01 RX ADMIN — MORPHINE SULFATE 4 MILLIGRAM(S): 50 CAPSULE, EXTENDED RELEASE ORAL at 19:29

## 2020-01-01 RX ADMIN — CEFTRIAXONE 100 MILLIGRAM(S): 500 INJECTION, POWDER, FOR SOLUTION INTRAMUSCULAR; INTRAVENOUS at 18:59

## 2020-01-01 RX ADMIN — Medication 10 MILLIGRAM(S): at 05:04

## 2020-01-01 RX ADMIN — Medication 5 MILLIGRAM(S): at 22:51

## 2020-01-01 RX ADMIN — CHLORHEXIDINE GLUCONATE 1 APPLICATION(S): 213 SOLUTION TOPICAL at 06:03

## 2020-01-01 RX ADMIN — Medication 1334 MILLIGRAM(S): at 17:41

## 2020-01-01 RX ADMIN — Medication 667 MILLIGRAM(S): at 12:07

## 2020-01-01 RX ADMIN — Medication 5 MILLIGRAM(S): at 23:21

## 2020-01-01 RX ADMIN — MORPHINE SULFATE 18 MILLIGRAM(S): 50 CAPSULE, EXTENDED RELEASE ORAL at 01:10

## 2020-01-01 RX ADMIN — OXYCODONE HYDROCHLORIDE 20 MILLIGRAM(S): 5 TABLET ORAL at 05:19

## 2020-01-01 RX ADMIN — Medication 2 MILLIGRAM(S): at 00:04

## 2020-01-01 RX ADMIN — MORPHINE SULFATE 18 MG/HR: 50 CAPSULE, EXTENDED RELEASE ORAL at 17:16

## 2020-01-01 RX ADMIN — Medication 650 MILLIGRAM(S): at 17:48

## 2020-01-01 RX ADMIN — Medication 1300 MILLIGRAM(S): at 21:31

## 2020-01-01 RX ADMIN — METHADONE HYDROCHLORIDE 80 MILLIGRAM(S): 40 TABLET ORAL at 13:53

## 2020-01-01 RX ADMIN — MUPIROCIN 1 APPLICATION(S): 20 OINTMENT TOPICAL at 05:22

## 2020-01-01 RX ADMIN — OXYCODONE HYDROCHLORIDE 30 MILLIGRAM(S): 5 TABLET ORAL at 06:32

## 2020-01-01 RX ADMIN — METHADONE HYDROCHLORIDE 80 MILLIGRAM(S): 40 TABLET ORAL at 00:19

## 2020-01-01 RX ADMIN — PANTOPRAZOLE SODIUM 40 MILLIGRAM(S): 20 TABLET, DELAYED RELEASE ORAL at 17:48

## 2020-01-01 RX ADMIN — Medication 2.5 MILLIGRAM(S): at 17:22

## 2020-01-01 RX ADMIN — Medication 1 MILLIGRAM(S): at 15:29

## 2020-01-01 RX ADMIN — MIDODRINE HYDROCHLORIDE 2.5 MILLIGRAM(S): 2.5 TABLET ORAL at 11:34

## 2020-01-01 RX ADMIN — METHADONE HYDROCHLORIDE 80 MILLIGRAM(S): 40 TABLET ORAL at 13:30

## 2020-01-01 RX ADMIN — Medication 100 MILLIGRAM(S): at 17:17

## 2020-01-01 RX ADMIN — MORPHINE SULFATE 18 MILLIGRAM(S): 50 CAPSULE, EXTENDED RELEASE ORAL at 16:41

## 2020-01-01 RX ADMIN — MORPHINE SULFATE 4 MILLIGRAM(S): 50 CAPSULE, EXTENDED RELEASE ORAL at 04:48

## 2020-01-01 RX ADMIN — Medication 1 MG/KG/HR: at 19:23

## 2020-01-01 RX ADMIN — Medication 50 GRAM(S): at 17:48

## 2020-01-01 RX ADMIN — SEVELAMER CARBONATE 800 MILLIGRAM(S): 2400 POWDER, FOR SUSPENSION ORAL at 18:13

## 2020-01-01 RX ADMIN — MORPHINE SULFATE 18 MILLIGRAM(S): 50 CAPSULE, EXTENDED RELEASE ORAL at 03:02

## 2020-01-01 RX ADMIN — METHADONE HYDROCHLORIDE 80 MILLIGRAM(S): 40 TABLET ORAL at 14:53

## 2020-01-01 RX ADMIN — Medication 25 MILLIGRAM(S): at 22:18

## 2020-01-01 RX ADMIN — Medication 650 MILLIGRAM(S): at 05:26

## 2020-01-01 RX ADMIN — Medication 2 MILLIGRAM(S): at 01:10

## 2020-01-01 RX ADMIN — MORPHINE SULFATE 18 MG/HR: 50 CAPSULE, EXTENDED RELEASE ORAL at 10:11

## 2020-01-01 RX ADMIN — OXYCODONE HYDROCHLORIDE 30 MILLIGRAM(S): 5 TABLET ORAL at 01:57

## 2020-01-01 RX ADMIN — MUPIROCIN 1 APPLICATION(S): 20 OINTMENT TOPICAL at 17:20

## 2020-01-01 RX ADMIN — MUPIROCIN 1 APPLICATION(S): 20 OINTMENT TOPICAL at 05:06

## 2020-01-01 RX ADMIN — MORPHINE SULFATE 10 MG/HR: 50 CAPSULE, EXTENDED RELEASE ORAL at 22:17

## 2020-01-01 RX ADMIN — Medication 650 MILLIGRAM(S): at 02:30

## 2020-01-01 RX ADMIN — Medication 5 MILLIGRAM(S): at 12:51

## 2020-01-01 RX ADMIN — METHADONE HYDROCHLORIDE 80 MILLIGRAM(S): 40 TABLET ORAL at 06:46

## 2020-01-01 RX ADMIN — OXYCODONE HYDROCHLORIDE 15 MILLIGRAM(S): 5 TABLET ORAL at 20:54

## 2020-01-01 RX ADMIN — Medication 667 MILLIGRAM(S): at 17:21

## 2020-01-01 RX ADMIN — HEPARIN SODIUM 5000 UNIT(S): 5000 INJECTION INTRAVENOUS; SUBCUTANEOUS at 14:07

## 2020-01-01 RX ADMIN — OXYCODONE HYDROCHLORIDE 30 MILLIGRAM(S): 5 TABLET ORAL at 16:36

## 2020-01-01 RX ADMIN — Medication 1 MILLIGRAM(S): at 13:08

## 2020-01-01 RX ADMIN — OXYCODONE HYDROCHLORIDE 30 MILLIGRAM(S): 5 TABLET ORAL at 21:03

## 2020-01-01 RX ADMIN — Medication 1 APPLICATION(S): at 12:02

## 2020-01-01 RX ADMIN — MORPHINE SULFATE 16 MILLIGRAM(S): 50 CAPSULE, EXTENDED RELEASE ORAL at 14:01

## 2020-01-01 RX ADMIN — Medication 1 APPLICATION(S): at 17:51

## 2020-01-01 RX ADMIN — Medication 500 MILLIGRAM(S): at 05:10

## 2020-01-01 RX ADMIN — Medication 1 MILLIGRAM(S): at 17:17

## 2020-01-01 RX ADMIN — SEVELAMER CARBONATE 800 MILLIGRAM(S): 2400 POWDER, FOR SUSPENSION ORAL at 12:09

## 2020-01-01 RX ADMIN — OXYCODONE HYDROCHLORIDE 20 MILLIGRAM(S): 5 TABLET ORAL at 17:38

## 2020-01-01 RX ADMIN — Medication 20 MILLIGRAM(S): at 05:51

## 2020-01-01 RX ADMIN — Medication 20 MILLIGRAM(S): at 05:05

## 2020-01-01 RX ADMIN — MORPHINE SULFATE 10 MILLIGRAM(S): 50 CAPSULE, EXTENDED RELEASE ORAL at 16:46

## 2020-01-01 RX ADMIN — MIDODRINE HYDROCHLORIDE 2.5 MILLIGRAM(S): 2.5 TABLET ORAL at 11:03

## 2020-01-01 RX ADMIN — SEVELAMER CARBONATE 800 MILLIGRAM(S): 2400 POWDER, FOR SUSPENSION ORAL at 08:06

## 2020-01-01 RX ADMIN — PANTOPRAZOLE SODIUM 40 MILLIGRAM(S): 20 TABLET, DELAYED RELEASE ORAL at 06:16

## 2020-01-01 RX ADMIN — Medication 2 MILLIGRAM(S): at 02:42

## 2020-01-01 RX ADMIN — Medication 5 MILLIGRAM(S): at 08:07

## 2020-01-01 RX ADMIN — METHADONE HYDROCHLORIDE 80 MILLIGRAM(S): 40 TABLET ORAL at 14:07

## 2020-01-01 RX ADMIN — OXYCODONE HYDROCHLORIDE 30 MILLIGRAM(S): 5 TABLET ORAL at 00:19

## 2020-01-01 RX ADMIN — OXYCODONE HYDROCHLORIDE 30 MILLIGRAM(S): 5 TABLET ORAL at 01:25

## 2020-01-01 RX ADMIN — MORPHINE SULFATE 10 MILLIGRAM(S): 50 CAPSULE, EXTENDED RELEASE ORAL at 12:30

## 2020-01-01 RX ADMIN — Medication 2 MILLIGRAM(S): at 18:30

## 2020-01-01 RX ADMIN — MIDODRINE HYDROCHLORIDE 2.5 MILLIGRAM(S): 2.5 TABLET ORAL at 12:05

## 2020-01-01 RX ADMIN — Medication 650 MILLIGRAM(S): at 17:55

## 2020-01-01 RX ADMIN — Medication 20 MILLIGRAM(S): at 06:43

## 2020-01-01 RX ADMIN — Medication 25 GRAM(S): at 13:17

## 2020-01-01 RX ADMIN — ONDANSETRON 4 MILLIGRAM(S): 8 TABLET, FILM COATED ORAL at 18:14

## 2020-01-01 RX ADMIN — Medication 2.5 MILLIGRAM(S): at 18:01

## 2020-01-01 RX ADMIN — ONDANSETRON 4 MILLIGRAM(S): 8 TABLET, FILM COATED ORAL at 22:09

## 2020-01-01 RX ADMIN — NYSTATIN CREAM 1 APPLICATION(S): 100000 CREAM TOPICAL at 17:42

## 2020-01-01 RX ADMIN — MIDODRINE HYDROCHLORIDE 2.5 MILLIGRAM(S): 2.5 TABLET ORAL at 12:43

## 2020-01-01 RX ADMIN — Medication 100 MILLIGRAM(S): at 17:24

## 2020-01-01 RX ADMIN — Medication 2 MILLIGRAM(S): at 21:54

## 2020-01-01 RX ADMIN — METHADONE HYDROCHLORIDE 60 MILLIGRAM(S): 40 TABLET ORAL at 15:53

## 2020-01-01 RX ADMIN — OXYCODONE HYDROCHLORIDE 30 MILLIGRAM(S): 5 TABLET ORAL at 00:56

## 2020-01-01 RX ADMIN — MIDODRINE HYDROCHLORIDE 2.5 MILLIGRAM(S): 2.5 TABLET ORAL at 05:46

## 2020-01-01 RX ADMIN — Medication 100 MILLIGRAM(S): at 21:09

## 2020-01-01 RX ADMIN — Medication 100 MILLIGRAM(S): at 17:57

## 2020-01-01 RX ADMIN — OXYCODONE HYDROCHLORIDE 20 MILLIGRAM(S): 5 TABLET ORAL at 17:17

## 2020-01-01 RX ADMIN — Medication 500 MILLIGRAM(S): at 05:17

## 2020-01-01 RX ADMIN — Medication 5 MILLIGRAM(S): at 22:58

## 2020-01-01 RX ADMIN — Medication 25 MILLIGRAM(S): at 23:14

## 2020-01-01 RX ADMIN — MORPHINE SULFATE 4 MILLIGRAM(S): 50 CAPSULE, EXTENDED RELEASE ORAL at 01:29

## 2020-01-01 RX ADMIN — HEPARIN SODIUM 5000 UNIT(S): 5000 INJECTION INTRAVENOUS; SUBCUTANEOUS at 23:15

## 2020-01-01 RX ADMIN — Medication 100 MICROGRAM(S): at 06:05

## 2020-01-01 RX ADMIN — OXYCODONE HYDROCHLORIDE 30 MILLIGRAM(S): 5 TABLET ORAL at 12:16

## 2020-01-01 RX ADMIN — MORPHINE SULFATE 18 MILLIGRAM(S): 50 CAPSULE, EXTENDED RELEASE ORAL at 08:56

## 2020-01-01 RX ADMIN — HEPARIN SODIUM 5000 UNIT(S): 5000 INJECTION INTRAVENOUS; SUBCUTANEOUS at 22:59

## 2020-01-01 RX ADMIN — CHLORHEXIDINE GLUCONATE 1 APPLICATION(S): 213 SOLUTION TOPICAL at 05:26

## 2020-01-01 RX ADMIN — Medication 2.5 MILLIGRAM(S): at 06:14

## 2020-01-01 RX ADMIN — Medication 10 MILLIGRAM(S): at 18:08

## 2020-01-01 RX ADMIN — Medication 100 MICROGRAM(S): at 05:54

## 2020-01-01 RX ADMIN — Medication 25 MILLIGRAM(S): at 21:13

## 2020-01-01 RX ADMIN — Medication 650 MILLIGRAM(S): at 06:16

## 2020-01-01 RX ADMIN — MUPIROCIN 1 APPLICATION(S): 20 OINTMENT TOPICAL at 05:13

## 2020-01-01 RX ADMIN — Medication 1 APPLICATION(S): at 05:06

## 2020-01-01 RX ADMIN — Medication 25 GRAM(S): at 16:55

## 2020-01-01 RX ADMIN — Medication 100 MILLIGRAM(S): at 05:04

## 2020-01-01 RX ADMIN — METHADONE HYDROCHLORIDE 80 MILLIGRAM(S): 40 TABLET ORAL at 13:50

## 2020-01-01 RX ADMIN — Medication 667 MILLIGRAM(S): at 12:09

## 2020-01-01 RX ADMIN — MORPHINE SULFATE 18 MILLIGRAM(S): 50 CAPSULE, EXTENDED RELEASE ORAL at 01:53

## 2020-01-01 RX ADMIN — METHADONE HYDROCHLORIDE 80 MILLIGRAM(S): 40 TABLET ORAL at 05:11

## 2020-01-01 RX ADMIN — Medication 5 MILLIGRAM(S): at 00:02

## 2020-01-01 RX ADMIN — FLUCONAZOLE 50 MILLIGRAM(S): 150 TABLET ORAL at 06:15

## 2020-01-01 RX ADMIN — Medication 1 APPLICATION(S): at 05:58

## 2020-01-01 RX ADMIN — Medication 100 MICROGRAM(S): at 05:09

## 2020-01-01 RX ADMIN — Medication 1300 MILLIGRAM(S): at 15:30

## 2020-01-01 RX ADMIN — OXYCODONE HYDROCHLORIDE 30 MILLIGRAM(S): 5 TABLET ORAL at 22:34

## 2020-01-01 RX ADMIN — MORPHINE SULFATE 18 MILLIGRAM(S): 50 CAPSULE, EXTENDED RELEASE ORAL at 19:33

## 2020-01-01 RX ADMIN — Medication 500 MILLIGRAM(S): at 17:19

## 2020-01-01 RX ADMIN — Medication 25 MILLILITER(S): at 13:07

## 2020-01-01 RX ADMIN — FLUCONAZOLE 50 MILLIGRAM(S): 150 TABLET ORAL at 18:02

## 2020-01-01 RX ADMIN — MEGESTROL ACETATE 20 MILLIGRAM(S): 40 SUSPENSION ORAL at 15:55

## 2020-01-01 RX ADMIN — FLUCONAZOLE 50 MILLIGRAM(S): 150 TABLET ORAL at 05:51

## 2020-01-01 RX ADMIN — Medication 650 MILLIGRAM(S): at 11:36

## 2020-01-01 RX ADMIN — Medication 2.5 MILLIGRAM(S): at 05:59

## 2020-01-01 RX ADMIN — Medication 667 MILLIGRAM(S): at 11:49

## 2020-01-01 RX ADMIN — Medication 100 MILLIGRAM(S): at 17:03

## 2020-01-01 RX ADMIN — SEVELAMER CARBONATE 800 MILLIGRAM(S): 2400 POWDER, FOR SUSPENSION ORAL at 12:04

## 2020-01-01 RX ADMIN — METHADONE HYDROCHLORIDE 80 MILLIGRAM(S): 40 TABLET ORAL at 21:30

## 2020-01-01 RX ADMIN — Medication 1 MILLIGRAM(S): at 13:31

## 2020-01-01 RX ADMIN — SEVELAMER CARBONATE 800 MILLIGRAM(S): 2400 POWDER, FOR SUSPENSION ORAL at 17:14

## 2020-01-01 RX ADMIN — Medication 650 MILLIGRAM(S): at 05:54

## 2020-01-01 RX ADMIN — Medication 1300 MILLIGRAM(S): at 21:06

## 2020-01-01 RX ADMIN — Medication 667 MILLIGRAM(S): at 11:42

## 2020-01-01 RX ADMIN — Medication 500 MILLIGRAM(S): at 05:06

## 2020-01-01 RX ADMIN — METHADONE HYDROCHLORIDE 80 MILLIGRAM(S): 40 TABLET ORAL at 05:35

## 2020-01-01 RX ADMIN — MORPHINE SULFATE 4 MILLIGRAM(S): 50 CAPSULE, EXTENDED RELEASE ORAL at 10:21

## 2020-01-01 RX ADMIN — MORPHINE SULFATE 18 MILLIGRAM(S): 50 CAPSULE, EXTENDED RELEASE ORAL at 16:17

## 2020-01-01 RX ADMIN — Medication 100 MILLIGRAM(S): at 17:19

## 2020-01-01 RX ADMIN — HEPARIN SODIUM 5000 UNIT(S): 5000 INJECTION INTRAVENOUS; SUBCUTANEOUS at 18:14

## 2020-01-01 RX ADMIN — Medication 100 MILLIGRAM(S): at 05:05

## 2020-01-01 RX ADMIN — Medication 650 MILLIGRAM(S): at 00:09

## 2020-01-01 RX ADMIN — MORPHINE SULFATE 4 MILLIGRAM(S): 50 CAPSULE, EXTENDED RELEASE ORAL at 07:19

## 2020-01-01 RX ADMIN — Medication 25 MILLIGRAM(S): at 21:26

## 2020-01-01 RX ADMIN — OXYCODONE HYDROCHLORIDE 30 MILLIGRAM(S): 5 TABLET ORAL at 09:04

## 2020-01-01 RX ADMIN — PANTOPRAZOLE SODIUM 40 MILLIGRAM(S): 20 TABLET, DELAYED RELEASE ORAL at 05:04

## 2020-01-01 RX ADMIN — Medication 5 MILLIGRAM(S): at 15:52

## 2020-01-01 RX ADMIN — OXYCODONE HYDROCHLORIDE 30 MILLIGRAM(S): 5 TABLET ORAL at 08:03

## 2020-01-01 RX ADMIN — OXYCODONE HYDROCHLORIDE 20 MILLIGRAM(S): 5 TABLET ORAL at 18:06

## 2020-01-01 RX ADMIN — HALOPERIDOL DECANOATE 2 MILLIGRAM(S): 100 INJECTION INTRAMUSCULAR at 19:47

## 2020-01-01 RX ADMIN — INSULIN HUMAN 5 UNIT(S): 100 INJECTION, SOLUTION SUBCUTANEOUS at 12:38

## 2020-01-01 RX ADMIN — NYSTATIN CREAM 1 APPLICATION(S): 100000 CREAM TOPICAL at 17:13

## 2020-01-01 RX ADMIN — OXYCODONE HYDROCHLORIDE 30 MILLIGRAM(S): 5 TABLET ORAL at 21:29

## 2020-01-01 RX ADMIN — MORPHINE SULFATE 10 MILLIGRAM(S): 50 CAPSULE, EXTENDED RELEASE ORAL at 08:45

## 2020-01-01 RX ADMIN — OXYCODONE HYDROCHLORIDE 20 MILLIGRAM(S): 5 TABLET ORAL at 06:27

## 2020-01-01 RX ADMIN — Medication 2 MILLIGRAM(S): at 17:02

## 2020-01-01 RX ADMIN — Medication 1300 MILLIGRAM(S): at 05:11

## 2020-01-01 RX ADMIN — MORPHINE SULFATE 10 MILLIGRAM(S): 50 CAPSULE, EXTENDED RELEASE ORAL at 05:41

## 2020-01-01 RX ADMIN — OXYCODONE HYDROCHLORIDE 30 MILLIGRAM(S): 5 TABLET ORAL at 21:57

## 2020-01-01 RX ADMIN — MORPHINE SULFATE 10 MG/HR: 50 CAPSULE, EXTENDED RELEASE ORAL at 18:30

## 2020-01-01 RX ADMIN — Medication 2 MILLIGRAM(S): at 14:08

## 2020-01-01 RX ADMIN — SODIUM CHLORIDE 75 MILLILITER(S): 9 INJECTION, SOLUTION INTRAVENOUS at 09:31

## 2020-01-01 RX ADMIN — MORPHINE SULFATE 18 MG/HR: 50 CAPSULE, EXTENDED RELEASE ORAL at 22:24

## 2020-01-01 RX ADMIN — METHADONE HYDROCHLORIDE 80 MILLIGRAM(S): 40 TABLET ORAL at 21:08

## 2020-01-01 RX ADMIN — OXYCODONE HYDROCHLORIDE 30 MILLIGRAM(S): 5 TABLET ORAL at 19:42

## 2020-01-01 RX ADMIN — Medication 200 MILLIGRAM(S): at 15:53

## 2020-01-01 RX ADMIN — MUPIROCIN 1 APPLICATION(S): 20 OINTMENT TOPICAL at 17:23

## 2020-01-01 RX ADMIN — MEGESTROL ACETATE 20 MILLIGRAM(S): 40 SUSPENSION ORAL at 05:05

## 2020-01-01 RX ADMIN — SEVELAMER CARBONATE 800 MILLIGRAM(S): 2400 POWDER, FOR SUSPENSION ORAL at 08:19

## 2020-01-01 RX ADMIN — OXYCODONE HYDROCHLORIDE 30 MILLIGRAM(S): 5 TABLET ORAL at 02:44

## 2020-01-01 RX ADMIN — Medication 1 APPLICATION(S): at 05:38

## 2020-01-01 RX ADMIN — MORPHINE SULFATE 10 MILLIGRAM(S): 50 CAPSULE, EXTENDED RELEASE ORAL at 11:31

## 2020-01-01 RX ADMIN — MORPHINE SULFATE 10 MILLIGRAM(S): 50 CAPSULE, EXTENDED RELEASE ORAL at 18:47

## 2020-01-01 RX ADMIN — FLUCONAZOLE 50 MILLIGRAM(S): 150 TABLET ORAL at 05:04

## 2020-01-01 RX ADMIN — Medication 1 APPLICATION(S): at 05:08

## 2020-01-01 RX ADMIN — PANTOPRAZOLE SODIUM 40 MILLIGRAM(S): 20 TABLET, DELAYED RELEASE ORAL at 18:13

## 2020-01-01 RX ADMIN — MORPHINE SULFATE 18 MG/HR: 50 CAPSULE, EXTENDED RELEASE ORAL at 14:09

## 2020-01-01 RX ADMIN — SEVELAMER CARBONATE 800 MILLIGRAM(S): 2400 POWDER, FOR SUSPENSION ORAL at 12:02

## 2020-01-01 RX ADMIN — Medication 5 MILLIGRAM(S): at 11:12

## 2020-01-01 RX ADMIN — MORPHINE SULFATE 10 MILLIGRAM(S): 50 CAPSULE, EXTENDED RELEASE ORAL at 01:22

## 2020-01-01 RX ADMIN — METHADONE HYDROCHLORIDE 80 MILLIGRAM(S): 40 TABLET ORAL at 05:06

## 2020-01-01 RX ADMIN — Medication 2000 UNIT(S): at 11:42

## 2020-01-01 RX ADMIN — METHADONE HYDROCHLORIDE 80 MILLIGRAM(S): 40 TABLET ORAL at 14:47

## 2020-01-01 RX ADMIN — FLUCONAZOLE 50 MILLIGRAM(S): 150 TABLET ORAL at 06:32

## 2020-01-01 RX ADMIN — Medication 650 MILLIGRAM(S): at 11:28

## 2020-01-01 RX ADMIN — Medication 100 MICROGRAM(S): at 05:51

## 2020-01-01 RX ADMIN — MORPHINE SULFATE 16 MG/HR: 50 CAPSULE, EXTENDED RELEASE ORAL at 14:04

## 2020-01-01 RX ADMIN — Medication 10 MILLIGRAM(S): at 17:16

## 2020-01-01 RX ADMIN — Medication 100 MICROGRAM(S): at 05:05

## 2020-01-01 RX ADMIN — MORPHINE SULFATE 16 MILLIGRAM(S): 50 CAPSULE, EXTENDED RELEASE ORAL at 01:44

## 2020-01-01 RX ADMIN — MORPHINE SULFATE 10 MILLIGRAM(S): 50 CAPSULE, EXTENDED RELEASE ORAL at 07:40

## 2020-01-01 RX ADMIN — METHADONE HYDROCHLORIDE 60 MILLIGRAM(S): 40 TABLET ORAL at 06:04

## 2020-01-01 RX ADMIN — HEPARIN SODIUM 5000 UNIT(S): 5000 INJECTION INTRAVENOUS; SUBCUTANEOUS at 05:11

## 2020-01-01 RX ADMIN — Medication 2 MILLIGRAM(S): at 23:01

## 2020-01-01 RX ADMIN — Medication 25 MILLIGRAM(S): at 21:08

## 2020-01-01 RX ADMIN — MORPHINE SULFATE 18 MG/HR: 50 CAPSULE, EXTENDED RELEASE ORAL at 02:41

## 2020-01-01 RX ADMIN — Medication 25 MILLIGRAM(S): at 22:38

## 2020-01-01 RX ADMIN — HEPARIN SODIUM 5000 UNIT(S): 5000 INJECTION INTRAVENOUS; SUBCUTANEOUS at 22:18

## 2020-01-01 RX ADMIN — MIDODRINE HYDROCHLORIDE 2.5 MILLIGRAM(S): 2.5 TABLET ORAL at 17:11

## 2020-01-01 RX ADMIN — HEPARIN SODIUM 5000 UNIT(S): 5000 INJECTION INTRAVENOUS; SUBCUTANEOUS at 06:05

## 2020-01-01 RX ADMIN — Medication 2000 UNIT(S): at 11:43

## 2020-01-01 RX ADMIN — Medication 100 MILLIGRAM(S): at 17:41

## 2020-01-01 RX ADMIN — SODIUM CHLORIDE 75 MILLILITER(S): 9 INJECTION, SOLUTION INTRAVENOUS at 15:07

## 2020-01-01 RX ADMIN — MIDODRINE HYDROCHLORIDE 2.5 MILLIGRAM(S): 2.5 TABLET ORAL at 11:43

## 2020-01-01 RX ADMIN — SEVELAMER CARBONATE 800 MILLIGRAM(S): 2400 POWDER, FOR SUSPENSION ORAL at 11:41

## 2020-01-01 RX ADMIN — GLUCAGON INJECTION, SOLUTION 1 MILLIGRAM(S): 0.5 INJECTION, SOLUTION SUBCUTANEOUS at 06:30

## 2020-01-01 RX ADMIN — OXYCODONE HYDROCHLORIDE 30 MILLIGRAM(S): 5 TABLET ORAL at 13:17

## 2020-01-01 RX ADMIN — MIDODRINE HYDROCHLORIDE 2.5 MILLIGRAM(S): 2.5 TABLET ORAL at 21:09

## 2020-01-01 RX ADMIN — OXYCODONE HYDROCHLORIDE 30 MILLIGRAM(S): 5 TABLET ORAL at 06:05

## 2020-01-01 RX ADMIN — MORPHINE SULFATE 10 MILLIGRAM(S): 50 CAPSULE, EXTENDED RELEASE ORAL at 22:24

## 2020-01-01 RX ADMIN — SCOPALAMINE 1 PATCH: 1 PATCH, EXTENDED RELEASE TRANSDERMAL at 19:51

## 2020-01-01 RX ADMIN — Medication 100 MILLIGRAM(S): at 06:03

## 2020-01-01 RX ADMIN — Medication 1 APPLICATION(S): at 17:13

## 2020-01-01 RX ADMIN — MORPHINE SULFATE 10 MG/HR: 50 CAPSULE, EXTENDED RELEASE ORAL at 05:21

## 2020-01-01 RX ADMIN — Medication 500 MILLIGRAM(S): at 05:04

## 2020-01-01 RX ADMIN — OXYCODONE HYDROCHLORIDE 30 MILLIGRAM(S): 5 TABLET ORAL at 01:37

## 2020-01-01 RX ADMIN — Medication 10 MILLIGRAM(S): at 17:22

## 2020-01-01 RX ADMIN — OXYCODONE HYDROCHLORIDE 30 MILLIGRAM(S): 5 TABLET ORAL at 22:25

## 2020-01-01 RX ADMIN — HEPARIN SODIUM 5000 UNIT(S): 5000 INJECTION INTRAVENOUS; SUBCUTANEOUS at 05:40

## 2020-01-01 RX ADMIN — OXYCODONE HYDROCHLORIDE 10 MILLIGRAM(S): 5 TABLET ORAL at 11:39

## 2020-01-01 RX ADMIN — OXYCODONE HYDROCHLORIDE 30 MILLIGRAM(S): 5 TABLET ORAL at 10:27

## 2020-01-01 RX ADMIN — OXYCODONE HYDROCHLORIDE 30 MILLIGRAM(S): 5 TABLET ORAL at 22:17

## 2020-01-01 RX ADMIN — MORPHINE SULFATE 10 MILLIGRAM(S): 50 CAPSULE, EXTENDED RELEASE ORAL at 12:00

## 2020-01-01 RX ADMIN — SEVELAMER CARBONATE 800 MILLIGRAM(S): 2400 POWDER, FOR SUSPENSION ORAL at 11:35

## 2020-01-01 RX ADMIN — MORPHINE SULFATE 4 MILLIGRAM(S): 50 CAPSULE, EXTENDED RELEASE ORAL at 16:51

## 2020-01-01 RX ADMIN — PANTOPRAZOLE SODIUM 40 MILLIGRAM(S): 20 TABLET, DELAYED RELEASE ORAL at 17:56

## 2020-01-01 RX ADMIN — METHADONE HYDROCHLORIDE 80 MILLIGRAM(S): 40 TABLET ORAL at 05:58

## 2020-01-01 RX ADMIN — OXYCODONE HYDROCHLORIDE 30 MILLIGRAM(S): 5 TABLET ORAL at 07:55

## 2020-01-01 RX ADMIN — PANTOPRAZOLE SODIUM 40 MILLIGRAM(S): 20 TABLET, DELAYED RELEASE ORAL at 05:36

## 2020-01-01 RX ADMIN — Medication 667 MILLIGRAM(S): at 08:13

## 2020-01-01 RX ADMIN — MORPHINE SULFATE 4 MILLIGRAM(S): 50 CAPSULE, EXTENDED RELEASE ORAL at 21:24

## 2020-01-01 RX ADMIN — Medication 650 MILLIGRAM(S): at 12:50

## 2020-01-01 RX ADMIN — Medication 100 MICROGRAM(S): at 05:06

## 2020-01-01 RX ADMIN — Medication 10 MILLIGRAM(S): at 17:23

## 2020-01-01 RX ADMIN — OXYCODONE HYDROCHLORIDE 30 MILLIGRAM(S): 5 TABLET ORAL at 00:30

## 2020-01-01 RX ADMIN — HEPARIN SODIUM 5000 UNIT(S): 5000 INJECTION INTRAVENOUS; SUBCUTANEOUS at 17:40

## 2020-01-01 RX ADMIN — Medication 1 APPLICATION(S): at 17:12

## 2020-01-01 RX ADMIN — Medication 650 MILLIGRAM(S): at 05:35

## 2020-01-01 RX ADMIN — OXYCODONE HYDROCHLORIDE 30 MILLIGRAM(S): 5 TABLET ORAL at 12:03

## 2020-01-01 RX ADMIN — Medication 20 MILLIGRAM(S): at 05:06

## 2020-01-01 RX ADMIN — Medication 2 MILLIGRAM(S): at 11:01

## 2020-01-01 RX ADMIN — Medication 5 MILLIGRAM(S): at 11:30

## 2020-01-01 RX ADMIN — Medication 667 MILLIGRAM(S): at 08:04

## 2020-01-01 RX ADMIN — HEPARIN SODIUM 5000 UNIT(S): 5000 INJECTION INTRAVENOUS; SUBCUTANEOUS at 13:28

## 2020-01-01 RX ADMIN — MIDODRINE HYDROCHLORIDE 2.5 MILLIGRAM(S): 2.5 TABLET ORAL at 06:05

## 2020-01-01 RX ADMIN — Medication 1300 MILLIGRAM(S): at 21:03

## 2020-01-01 RX ADMIN — OXYCODONE HYDROCHLORIDE 30 MILLIGRAM(S): 5 TABLET ORAL at 08:12

## 2020-01-01 RX ADMIN — SEVELAMER CARBONATE 800 MILLIGRAM(S): 2400 POWDER, FOR SUSPENSION ORAL at 08:16

## 2020-01-01 RX ADMIN — Medication 667 MILLIGRAM(S): at 17:14

## 2020-01-01 RX ADMIN — FLUCONAZOLE 50 MILLIGRAM(S): 150 TABLET ORAL at 05:07

## 2020-01-01 RX ADMIN — Medication 1300 MILLIGRAM(S): at 13:18

## 2020-01-01 RX ADMIN — MIDODRINE HYDROCHLORIDE 2.5 MILLIGRAM(S): 2.5 TABLET ORAL at 12:36

## 2020-01-01 RX ADMIN — MORPHINE SULFATE 10 MG/HR: 50 CAPSULE, EXTENDED RELEASE ORAL at 23:30

## 2020-01-01 RX ADMIN — OXYCODONE HYDROCHLORIDE 20 MILLIGRAM(S): 5 TABLET ORAL at 17:59

## 2020-01-01 RX ADMIN — HEPARIN SODIUM 5000 UNIT(S): 5000 INJECTION INTRAVENOUS; SUBCUTANEOUS at 17:41

## 2020-01-01 RX ADMIN — HEPARIN SODIUM 5000 UNIT(S): 5000 INJECTION INTRAVENOUS; SUBCUTANEOUS at 17:15

## 2020-01-01 RX ADMIN — METHADONE HYDROCHLORIDE 80 MILLIGRAM(S): 40 TABLET ORAL at 21:47

## 2020-01-01 RX ADMIN — SEVELAMER CARBONATE 800 MILLIGRAM(S): 2400 POWDER, FOR SUSPENSION ORAL at 17:16

## 2020-01-01 RX ADMIN — MORPHINE SULFATE 10 MILLIGRAM(S): 50 CAPSULE, EXTENDED RELEASE ORAL at 18:12

## 2020-01-01 RX ADMIN — SODIUM CHLORIDE 75 MILLILITER(S): 9 INJECTION, SOLUTION INTRAVENOUS at 05:51

## 2020-01-01 RX ADMIN — HALOPERIDOL DECANOATE 2 MILLIGRAM(S): 100 INJECTION INTRAMUSCULAR at 20:02

## 2020-01-01 RX ADMIN — Medication 2 MILLIGRAM(S): at 11:31

## 2020-01-01 RX ADMIN — Medication 650 MILLIGRAM(S): at 12:21

## 2020-01-01 RX ADMIN — SEVELAMER CARBONATE 800 MILLIGRAM(S): 2400 POWDER, FOR SUSPENSION ORAL at 11:36

## 2020-01-01 RX ADMIN — MEGESTROL ACETATE 20 MILLIGRAM(S): 40 SUSPENSION ORAL at 15:40

## 2020-01-01 RX ADMIN — Medication 650 MILLIGRAM(S): at 13:53

## 2020-01-01 RX ADMIN — Medication 650 MILLIGRAM(S): at 11:49

## 2020-01-01 RX ADMIN — Medication 2 MILLIGRAM(S): at 06:32

## 2020-01-01 RX ADMIN — MORPHINE SULFATE 2 MG/HR: 50 CAPSULE, EXTENDED RELEASE ORAL at 10:57

## 2020-01-01 RX ADMIN — OXYCODONE HYDROCHLORIDE 20 MILLIGRAM(S): 5 TABLET ORAL at 05:03

## 2020-01-01 RX ADMIN — OXYCODONE HYDROCHLORIDE 30 MILLIGRAM(S): 5 TABLET ORAL at 16:16

## 2020-01-01 RX ADMIN — FLUCONAZOLE 50 MILLIGRAM(S): 150 TABLET ORAL at 18:13

## 2020-01-01 RX ADMIN — Medication 5 MILLIGRAM(S): at 00:09

## 2020-01-01 RX ADMIN — SEVELAMER CARBONATE 800 MILLIGRAM(S): 2400 POWDER, FOR SUSPENSION ORAL at 08:04

## 2020-01-01 RX ADMIN — HEPARIN SODIUM 5000 UNIT(S): 5000 INJECTION INTRAVENOUS; SUBCUTANEOUS at 21:34

## 2020-01-01 RX ADMIN — Medication 10 MILLIGRAM(S): at 17:45

## 2020-01-01 RX ADMIN — MORPHINE SULFATE 18 MG/HR: 50 CAPSULE, EXTENDED RELEASE ORAL at 07:38

## 2020-01-01 RX ADMIN — MORPHINE SULFATE 18 MILLIGRAM(S): 50 CAPSULE, EXTENDED RELEASE ORAL at 20:33

## 2020-01-01 RX ADMIN — MORPHINE SULFATE 10 MILLIGRAM(S): 50 CAPSULE, EXTENDED RELEASE ORAL at 13:17

## 2020-01-01 RX ADMIN — PANTOPRAZOLE SODIUM 40 MILLIGRAM(S): 20 TABLET, DELAYED RELEASE ORAL at 18:30

## 2020-01-01 RX ADMIN — METHADONE HYDROCHLORIDE 80 MILLIGRAM(S): 40 TABLET ORAL at 22:17

## 2020-01-01 RX ADMIN — MORPHINE SULFATE 18 MG/HR: 50 CAPSULE, EXTENDED RELEASE ORAL at 16:39

## 2020-01-01 RX ADMIN — MORPHINE SULFATE 4 MILLIGRAM(S): 50 CAPSULE, EXTENDED RELEASE ORAL at 02:23

## 2020-01-01 RX ADMIN — Medication 5 MILLIGRAM(S): at 21:34

## 2020-01-01 RX ADMIN — MORPHINE SULFATE 10 MILLIGRAM(S): 50 CAPSULE, EXTENDED RELEASE ORAL at 20:52

## 2020-01-01 RX ADMIN — OXYCODONE HYDROCHLORIDE 30 MILLIGRAM(S): 5 TABLET ORAL at 07:25

## 2020-01-01 RX ADMIN — NYSTATIN CREAM 1 APPLICATION(S): 100000 CREAM TOPICAL at 05:45

## 2020-01-01 RX ADMIN — Medication 1300 MILLIGRAM(S): at 21:08

## 2020-01-01 RX ADMIN — METHADONE HYDROCHLORIDE 60 MILLIGRAM(S): 40 TABLET ORAL at 21:34

## 2020-01-01 RX ADMIN — HEPARIN SODIUM 5000 UNIT(S): 5000 INJECTION INTRAVENOUS; SUBCUTANEOUS at 15:54

## 2020-01-01 RX ADMIN — METHADONE HYDROCHLORIDE 80 MILLIGRAM(S): 40 TABLET ORAL at 06:18

## 2020-01-01 RX ADMIN — MORPHINE SULFATE 10 MILLIGRAM(S): 50 CAPSULE, EXTENDED RELEASE ORAL at 18:25

## 2020-01-01 RX ADMIN — Medication 250 MILLIGRAM(S): at 14:55

## 2020-01-01 RX ADMIN — SODIUM CHLORIDE 75 MILLILITER(S): 9 INJECTION INTRAMUSCULAR; INTRAVENOUS; SUBCUTANEOUS at 21:03

## 2020-01-01 RX ADMIN — Medication 2 MILLIGRAM(S): at 16:11

## 2020-01-01 RX ADMIN — SEVELAMER CARBONATE 800 MILLIGRAM(S): 2400 POWDER, FOR SUSPENSION ORAL at 17:29

## 2020-01-01 RX ADMIN — Medication 5 MILLIGRAM(S): at 23:43

## 2020-01-01 RX ADMIN — Medication 5 MILLIGRAM(S): at 11:28

## 2020-01-01 RX ADMIN — OXYCODONE HYDROCHLORIDE 30 MILLIGRAM(S): 5 TABLET ORAL at 08:06

## 2020-01-01 RX ADMIN — MIDODRINE HYDROCHLORIDE 2.5 MILLIGRAM(S): 2.5 TABLET ORAL at 05:18

## 2020-01-01 RX ADMIN — MORPHINE SULFATE 10 MG/HR: 50 CAPSULE, EXTENDED RELEASE ORAL at 19:44

## 2020-01-01 RX ADMIN — HEPARIN SODIUM 5000 UNIT(S): 5000 INJECTION INTRAVENOUS; SUBCUTANEOUS at 05:53

## 2020-01-01 RX ADMIN — OXYCODONE HYDROCHLORIDE 30 MILLIGRAM(S): 5 TABLET ORAL at 20:58

## 2020-01-01 RX ADMIN — Medication 10 MILLIGRAM(S): at 05:06

## 2020-01-01 RX ADMIN — Medication 1 MILLIGRAM(S): at 18:48

## 2020-01-01 RX ADMIN — SEVELAMER CARBONATE 800 MILLIGRAM(S): 2400 POWDER, FOR SUSPENSION ORAL at 11:48

## 2020-01-01 RX ADMIN — METHADONE HYDROCHLORIDE 80 MILLIGRAM(S): 40 TABLET ORAL at 14:44

## 2020-01-01 RX ADMIN — METHADONE HYDROCHLORIDE 80 MILLIGRAM(S): 40 TABLET ORAL at 13:16

## 2020-01-01 RX ADMIN — Medication 500 MILLIGRAM(S): at 17:15

## 2020-01-01 RX ADMIN — OXYCODONE HYDROCHLORIDE 30 MILLIGRAM(S): 5 TABLET ORAL at 14:46

## 2020-01-01 RX ADMIN — Medication 10 MILLIGRAM(S): at 05:39

## 2020-01-01 RX ADMIN — OXYCODONE HYDROCHLORIDE 30 MILLIGRAM(S): 5 TABLET ORAL at 17:55

## 2020-01-01 RX ADMIN — METHADONE HYDROCHLORIDE 80 MILLIGRAM(S): 40 TABLET ORAL at 13:09

## 2020-01-01 RX ADMIN — HEPARIN SODIUM 5000 UNIT(S): 5000 INJECTION INTRAVENOUS; SUBCUTANEOUS at 17:08

## 2020-01-01 RX ADMIN — METHADONE HYDROCHLORIDE 80 MILLIGRAM(S): 40 TABLET ORAL at 05:51

## 2020-01-01 RX ADMIN — SEVELAMER CARBONATE 800 MILLIGRAM(S): 2400 POWDER, FOR SUSPENSION ORAL at 17:20

## 2020-01-01 RX ADMIN — Medication 10 MILLIGRAM(S): at 18:03

## 2020-01-01 RX ADMIN — Medication 100 MILLIGRAM(S): at 17:48

## 2020-01-01 RX ADMIN — MORPHINE SULFATE 4 MILLIGRAM(S): 50 CAPSULE, EXTENDED RELEASE ORAL at 22:26

## 2020-01-01 RX ADMIN — OXYCODONE HYDROCHLORIDE 30 MILLIGRAM(S): 5 TABLET ORAL at 13:30

## 2020-01-01 RX ADMIN — OXYCODONE HYDROCHLORIDE 30 MILLIGRAM(S): 5 TABLET ORAL at 06:53

## 2020-01-01 RX ADMIN — Medication 2000 UNIT(S): at 12:09

## 2020-01-01 RX ADMIN — Medication 2000 UNIT(S): at 12:21

## 2020-01-01 RX ADMIN — Medication 1334 MILLIGRAM(S): at 11:43

## 2020-01-01 RX ADMIN — Medication 5 MILLIGRAM(S): at 01:03

## 2020-01-01 RX ADMIN — SODIUM CHLORIDE 75 MILLILITER(S): 9 INJECTION, SOLUTION INTRAVENOUS at 23:13

## 2020-01-01 RX ADMIN — Medication 100 MICROGRAM(S): at 05:10

## 2020-01-01 RX ADMIN — Medication 667 MILLIGRAM(S): at 08:27

## 2020-01-01 RX ADMIN — SEVELAMER CARBONATE 800 MILLIGRAM(S): 2400 POWDER, FOR SUSPENSION ORAL at 11:16

## 2020-01-01 RX ADMIN — Medication 10 MILLIGRAM(S): at 18:52

## 2020-01-01 RX ADMIN — Medication 100 MICROGRAM(S): at 05:26

## 2020-01-01 RX ADMIN — Medication 650 MILLIGRAM(S): at 19:30

## 2020-01-01 RX ADMIN — Medication 667 MILLIGRAM(S): at 12:21

## 2020-01-01 RX ADMIN — SEVELAMER CARBONATE 800 MILLIGRAM(S): 2400 POWDER, FOR SUSPENSION ORAL at 17:18

## 2020-01-01 RX ADMIN — Medication 10 MILLIGRAM(S): at 05:11

## 2020-01-01 RX ADMIN — SODIUM CHLORIDE 500 MILLILITER(S): 9 INJECTION INTRAMUSCULAR; INTRAVENOUS; SUBCUTANEOUS at 08:15

## 2020-01-01 RX ADMIN — SEVELAMER CARBONATE 800 MILLIGRAM(S): 2400 POWDER, FOR SUSPENSION ORAL at 18:30

## 2020-01-01 RX ADMIN — METHADONE HYDROCHLORIDE 10 MILLIGRAM(S): 40 TABLET ORAL at 06:40

## 2020-01-01 RX ADMIN — METHADONE HYDROCHLORIDE 60 MILLIGRAM(S): 40 TABLET ORAL at 06:05

## 2020-01-01 RX ADMIN — Medication 2 MILLIGRAM(S): at 05:02

## 2020-01-01 RX ADMIN — Medication 20 MILLIGRAM(S): at 05:54

## 2020-01-01 RX ADMIN — Medication 650 MILLIGRAM(S): at 18:30

## 2020-01-01 RX ADMIN — MIDODRINE HYDROCHLORIDE 2.5 MILLIGRAM(S): 2.5 TABLET ORAL at 17:23

## 2020-01-01 RX ADMIN — Medication 2 MILLIGRAM(S): at 18:34

## 2020-01-01 RX ADMIN — Medication 5 MILLIGRAM(S): at 11:46

## 2020-01-01 RX ADMIN — OXYCODONE HYDROCHLORIDE 30 MILLIGRAM(S): 5 TABLET ORAL at 07:29

## 2020-01-01 RX ADMIN — MORPHINE SULFATE 10 MILLIGRAM(S): 50 CAPSULE, EXTENDED RELEASE ORAL at 15:28

## 2020-01-01 RX ADMIN — MORPHINE SULFATE 10 MILLIGRAM(S): 50 CAPSULE, EXTENDED RELEASE ORAL at 22:58

## 2020-01-01 RX ADMIN — MORPHINE SULFATE 60 MILLIGRAM(S): 50 CAPSULE, EXTENDED RELEASE ORAL at 06:42

## 2020-01-01 RX ADMIN — Medication 1 MILLIGRAM(S): at 00:24

## 2020-01-01 RX ADMIN — Medication 500 MILLIGRAM(S): at 17:59

## 2020-01-01 RX ADMIN — MIDODRINE HYDROCHLORIDE 2.5 MILLIGRAM(S): 2.5 TABLET ORAL at 17:42

## 2020-01-01 RX ADMIN — SODIUM CHLORIDE 75 MILLILITER(S): 9 INJECTION, SOLUTION INTRAVENOUS at 13:19

## 2020-01-01 RX ADMIN — Medication 25 MILLIGRAM(S): at 21:22

## 2020-01-01 RX ADMIN — OXYCODONE HYDROCHLORIDE 30 MILLIGRAM(S): 5 TABLET ORAL at 22:16

## 2020-01-01 RX ADMIN — Medication 10 MILLIGRAM(S): at 05:32

## 2020-01-01 RX ADMIN — Medication 1 APPLICATION(S): at 17:36

## 2020-01-01 RX ADMIN — INSULIN HUMAN 10 UNIT(S): 100 INJECTION, SOLUTION SUBCUTANEOUS at 10:41

## 2020-01-01 RX ADMIN — Medication 2000 UNIT(S): at 11:48

## 2020-01-01 RX ADMIN — Medication 10 MILLIGRAM(S): at 06:05

## 2020-01-01 RX ADMIN — FLUCONAZOLE 50 MILLIGRAM(S): 150 TABLET ORAL at 06:04

## 2020-01-01 RX ADMIN — Medication 650 MILLIGRAM(S): at 18:42

## 2020-01-01 RX ADMIN — Medication 110 MILLIGRAM(S): at 18:04

## 2020-01-01 RX ADMIN — Medication 1 MILLIGRAM(S): at 17:45

## 2020-01-01 RX ADMIN — Medication 1 MILLIGRAM(S): at 14:10

## 2020-01-01 RX ADMIN — MORPHINE SULFATE 10 MG/HR: 50 CAPSULE, EXTENDED RELEASE ORAL at 06:50

## 2020-01-01 RX ADMIN — Medication 10 MILLIGRAM(S): at 06:03

## 2020-01-01 RX ADMIN — METHADONE HYDROCHLORIDE 80 MILLIGRAM(S): 40 TABLET ORAL at 22:39

## 2020-01-01 RX ADMIN — MORPHINE SULFATE 16 MG/HR: 50 CAPSULE, EXTENDED RELEASE ORAL at 15:17

## 2020-01-01 RX ADMIN — Medication 10 MILLIGRAM(S): at 05:19

## 2020-01-01 RX ADMIN — SODIUM CHLORIDE 75 MILLILITER(S): 9 INJECTION, SOLUTION INTRAVENOUS at 21:24

## 2020-01-01 RX ADMIN — FLUCONAZOLE 50 MILLIGRAM(S): 150 TABLET ORAL at 18:31

## 2020-01-01 RX ADMIN — MORPHINE SULFATE 10 MG/HR: 50 CAPSULE, EXTENDED RELEASE ORAL at 16:41

## 2020-01-01 RX ADMIN — OXYCODONE HYDROCHLORIDE 30 MILLIGRAM(S): 5 TABLET ORAL at 05:46

## 2020-01-01 RX ADMIN — MIDODRINE HYDROCHLORIDE 2.5 MILLIGRAM(S): 2.5 TABLET ORAL at 11:35

## 2020-01-01 RX ADMIN — Medication 650 MILLIGRAM(S): at 12:06

## 2020-01-01 RX ADMIN — OXYCODONE HYDROCHLORIDE 30 MILLIGRAM(S): 5 TABLET ORAL at 01:17

## 2020-01-01 RX ADMIN — MIDODRINE HYDROCHLORIDE 2.5 MILLIGRAM(S): 2.5 TABLET ORAL at 11:41

## 2020-01-01 RX ADMIN — MORPHINE SULFATE 10 MG/HR: 50 CAPSULE, EXTENDED RELEASE ORAL at 01:00

## 2020-01-01 RX ADMIN — OXYCODONE HYDROCHLORIDE 20 MILLIGRAM(S): 5 TABLET ORAL at 09:09

## 2020-01-01 RX ADMIN — MEGESTROL ACETATE 400 MILLIGRAM(S): 40 SUSPENSION ORAL at 12:37

## 2020-01-01 RX ADMIN — MORPHINE SULFATE 16 MILLIGRAM(S): 50 CAPSULE, EXTENDED RELEASE ORAL at 10:43

## 2020-01-01 RX ADMIN — HEPARIN SODIUM 5000 UNIT(S): 5000 INJECTION INTRAVENOUS; SUBCUTANEOUS at 17:17

## 2020-01-01 RX ADMIN — MIDODRINE HYDROCHLORIDE 2.5 MILLIGRAM(S): 2.5 TABLET ORAL at 12:02

## 2020-01-01 RX ADMIN — Medication 25 MILLIGRAM(S): at 21:32

## 2020-01-01 RX ADMIN — MORPHINE SULFATE 10 MILLIGRAM(S): 50 CAPSULE, EXTENDED RELEASE ORAL at 04:20

## 2020-01-01 RX ADMIN — Medication 1300 MILLIGRAM(S): at 14:47

## 2020-01-01 RX ADMIN — OXYCODONE HYDROCHLORIDE 30 MILLIGRAM(S): 5 TABLET ORAL at 11:44

## 2020-01-01 RX ADMIN — MORPHINE SULFATE 18 MILLIGRAM(S): 50 CAPSULE, EXTENDED RELEASE ORAL at 16:05

## 2020-01-01 RX ADMIN — MEGESTROL ACETATE 20 MILLIGRAM(S): 40 SUSPENSION ORAL at 21:06

## 2020-01-01 RX ADMIN — MIDODRINE HYDROCHLORIDE 2.5 MILLIGRAM(S): 2.5 TABLET ORAL at 11:11

## 2020-01-01 RX ADMIN — Medication 2 MILLIGRAM(S): at 18:07

## 2020-01-01 RX ADMIN — OXYCODONE HYDROCHLORIDE 30 MILLIGRAM(S): 5 TABLET ORAL at 21:26

## 2020-01-01 RX ADMIN — Medication 10 MILLIGRAM(S): at 17:05

## 2020-01-01 RX ADMIN — Medication 10 MILLIGRAM(S): at 05:44

## 2020-01-01 RX ADMIN — Medication 2000 UNIT(S): at 11:35

## 2020-01-01 RX ADMIN — HEPARIN SODIUM 5000 UNIT(S): 5000 INJECTION INTRAVENOUS; SUBCUTANEOUS at 22:00

## 2020-01-01 RX ADMIN — HEPARIN SODIUM 5000 UNIT(S): 5000 INJECTION INTRAVENOUS; SUBCUTANEOUS at 17:24

## 2020-01-01 RX ADMIN — Medication 100 MICROGRAM(S): at 06:16

## 2020-01-01 RX ADMIN — SCOPALAMINE 1 PATCH: 1 PATCH, EXTENDED RELEASE TRANSDERMAL at 00:30

## 2020-01-01 RX ADMIN — MORPHINE SULFATE 18 MG/HR: 50 CAPSULE, EXTENDED RELEASE ORAL at 04:33

## 2020-01-01 RX ADMIN — MIDODRINE HYDROCHLORIDE 2.5 MILLIGRAM(S): 2.5 TABLET ORAL at 17:53

## 2020-01-01 RX ADMIN — Medication 100 MILLIGRAM(S): at 05:25

## 2020-01-01 RX ADMIN — MORPHINE SULFATE 10 MILLIGRAM(S): 50 CAPSULE, EXTENDED RELEASE ORAL at 09:06

## 2020-01-01 RX ADMIN — Medication 2 MILLIGRAM(S): at 17:18

## 2020-01-01 RX ADMIN — METHADONE HYDROCHLORIDE 80 MILLIGRAM(S): 40 TABLET ORAL at 22:59

## 2020-01-01 RX ADMIN — MORPHINE SULFATE 10 MILLIGRAM(S): 50 CAPSULE, EXTENDED RELEASE ORAL at 02:01

## 2020-01-01 RX ADMIN — OXYCODONE HYDROCHLORIDE 30 MILLIGRAM(S): 5 TABLET ORAL at 21:54

## 2020-01-01 RX ADMIN — MIDODRINE HYDROCHLORIDE 2.5 MILLIGRAM(S): 2.5 TABLET ORAL at 08:38

## 2020-01-01 RX ADMIN — Medication 10 MILLIGRAM(S): at 17:42

## 2020-01-01 RX ADMIN — FLUCONAZOLE 50 MILLIGRAM(S): 150 TABLET ORAL at 06:40

## 2020-01-01 RX ADMIN — SODIUM POLYSTYRENE SULFONATE 30 GRAM(S): 4.1 POWDER, FOR SUSPENSION ORAL at 18:14

## 2020-01-01 RX ADMIN — MORPHINE SULFATE 18 MG/HR: 50 CAPSULE, EXTENDED RELEASE ORAL at 15:53

## 2020-01-01 RX ADMIN — Medication 1300 MILLIGRAM(S): at 21:34

## 2020-01-01 RX ADMIN — Medication 5 MILLIGRAM(S): at 17:46

## 2020-01-01 RX ADMIN — MIDODRINE HYDROCHLORIDE 2.5 MILLIGRAM(S): 2.5 TABLET ORAL at 17:15

## 2020-01-01 RX ADMIN — MORPHINE SULFATE 4 MILLIGRAM(S): 50 CAPSULE, EXTENDED RELEASE ORAL at 12:56

## 2020-01-01 RX ADMIN — SEVELAMER CARBONATE 800 MILLIGRAM(S): 2400 POWDER, FOR SUSPENSION ORAL at 16:58

## 2020-01-01 RX ADMIN — MORPHINE SULFATE 18 MILLIGRAM(S): 50 CAPSULE, EXTENDED RELEASE ORAL at 04:22

## 2020-01-01 RX ADMIN — OXYCODONE HYDROCHLORIDE 30 MILLIGRAM(S): 5 TABLET ORAL at 20:40

## 2020-01-01 RX ADMIN — MIDODRINE HYDROCHLORIDE 2.5 MILLIGRAM(S): 2.5 TABLET ORAL at 13:28

## 2020-01-01 RX ADMIN — MORPHINE SULFATE 18 MILLIGRAM(S): 50 CAPSULE, EXTENDED RELEASE ORAL at 06:38

## 2020-01-01 RX ADMIN — OXYCODONE HYDROCHLORIDE 30 MILLIGRAM(S): 5 TABLET ORAL at 05:33

## 2020-01-01 RX ADMIN — OXYCODONE HYDROCHLORIDE 30 MILLIGRAM(S): 5 TABLET ORAL at 07:59

## 2020-01-01 RX ADMIN — MORPHINE SULFATE 18 MILLIGRAM(S): 50 CAPSULE, EXTENDED RELEASE ORAL at 18:35

## 2020-01-01 RX ADMIN — PANTOPRAZOLE SODIUM 40 MILLIGRAM(S): 20 TABLET, DELAYED RELEASE ORAL at 18:44

## 2020-01-01 RX ADMIN — Medication 10 MILLIGRAM(S): at 17:29

## 2020-01-01 RX ADMIN — Medication 1 APPLICATION(S): at 06:06

## 2020-01-01 RX ADMIN — MORPHINE SULFATE 4 MILLIGRAM(S): 50 CAPSULE, EXTENDED RELEASE ORAL at 21:19

## 2020-01-01 RX ADMIN — Medication 10 MILLIGRAM(S): at 05:51

## 2020-01-01 RX ADMIN — Medication 1300 MILLIGRAM(S): at 14:21

## 2020-01-01 RX ADMIN — MORPHINE SULFATE 18 MG/HR: 50 CAPSULE, EXTENDED RELEASE ORAL at 01:08

## 2020-01-01 RX ADMIN — OXYCODONE HYDROCHLORIDE 30 MILLIGRAM(S): 5 TABLET ORAL at 22:21

## 2020-01-01 RX ADMIN — Medication 650 MILLIGRAM(S): at 18:17

## 2020-01-01 RX ADMIN — MEGESTROL ACETATE 20 MILLIGRAM(S): 40 SUSPENSION ORAL at 13:11

## 2020-01-01 RX ADMIN — SODIUM CHLORIDE 75 MILLILITER(S): 9 INJECTION, SOLUTION INTRAVENOUS at 23:31

## 2020-01-01 RX ADMIN — Medication 500 MILLILITER(S): at 07:01

## 2020-01-01 RX ADMIN — PANTOPRAZOLE SODIUM 40 MILLIGRAM(S): 20 TABLET, DELAYED RELEASE ORAL at 05:54

## 2020-01-01 RX ADMIN — MIDODRINE HYDROCHLORIDE 2.5 MILLIGRAM(S): 2.5 TABLET ORAL at 17:26

## 2020-01-01 RX ADMIN — MEGESTROL ACETATE 20 MILLIGRAM(S): 40 SUSPENSION ORAL at 05:09

## 2020-01-01 RX ADMIN — MORPHINE SULFATE 18 MILLIGRAM(S): 50 CAPSULE, EXTENDED RELEASE ORAL at 21:54

## 2020-01-01 RX ADMIN — Medication 110 MILLIGRAM(S): at 17:48

## 2020-01-01 RX ADMIN — OXYCODONE HYDROCHLORIDE 30 MILLIGRAM(S): 5 TABLET ORAL at 08:25

## 2020-01-01 RX ADMIN — Medication 2 MILLIGRAM(S): at 11:38

## 2020-01-01 RX ADMIN — Medication 1 APPLICATION(S): at 06:32

## 2020-01-01 RX ADMIN — Medication 650 MILLIGRAM(S): at 00:41

## 2020-01-01 RX ADMIN — Medication 250 MILLIGRAM(S): at 14:20

## 2020-01-01 RX ADMIN — Medication 25 MILLIGRAM(S): at 21:19

## 2020-01-01 RX ADMIN — Medication 650 MILLIGRAM(S): at 12:00

## 2020-01-01 RX ADMIN — Medication 10 MILLIGRAM(S): at 06:16

## 2020-01-01 RX ADMIN — Medication 667 MILLIGRAM(S): at 08:46

## 2020-01-01 RX ADMIN — Medication 2000 UNIT(S): at 11:25

## 2020-01-01 RX ADMIN — Medication 5 MILLIGRAM(S): at 10:56

## 2020-01-01 RX ADMIN — Medication 100 GRAM(S): at 12:22

## 2020-01-01 RX ADMIN — OXYCODONE HYDROCHLORIDE 30 MILLIGRAM(S): 5 TABLET ORAL at 22:27

## 2020-01-01 RX ADMIN — MIDODRINE HYDROCHLORIDE 2.5 MILLIGRAM(S): 2.5 TABLET ORAL at 16:58

## 2020-01-01 RX ADMIN — OXYCODONE HYDROCHLORIDE 30 MILLIGRAM(S): 5 TABLET ORAL at 23:12

## 2020-01-01 RX ADMIN — Medication 1 MILLIGRAM(S): at 11:09

## 2020-01-01 RX ADMIN — SCOPALAMINE 1 PATCH: 1 PATCH, EXTENDED RELEASE TRANSDERMAL at 20:34

## 2020-01-01 RX ADMIN — METHADONE HYDROCHLORIDE 60 MILLIGRAM(S): 40 TABLET ORAL at 14:07

## 2020-01-01 RX ADMIN — Medication 100 MICROGRAM(S): at 05:20

## 2020-01-01 RX ADMIN — SODIUM CHLORIDE 100 MILLILITER(S): 9 INJECTION, SOLUTION INTRAVENOUS at 21:07

## 2020-01-01 RX ADMIN — Medication 2.5 MILLIGRAM(S): at 17:29

## 2020-01-01 RX ADMIN — SEVELAMER CARBONATE 800 MILLIGRAM(S): 2400 POWDER, FOR SUSPENSION ORAL at 12:38

## 2020-01-01 RX ADMIN — Medication 667 MILLIGRAM(S): at 08:43

## 2020-01-01 RX ADMIN — OXYCODONE HYDROCHLORIDE 30 MILLIGRAM(S): 5 TABLET ORAL at 12:51

## 2020-01-01 RX ADMIN — SEVELAMER CARBONATE 800 MILLIGRAM(S): 2400 POWDER, FOR SUSPENSION ORAL at 17:39

## 2020-01-01 RX ADMIN — OXYCODONE HYDROCHLORIDE 30 MILLIGRAM(S): 5 TABLET ORAL at 03:29

## 2020-01-01 RX ADMIN — Medication 2.5 MILLIGRAM(S): at 18:30

## 2020-01-01 RX ADMIN — MORPHINE SULFATE 18 MILLIGRAM(S): 50 CAPSULE, EXTENDED RELEASE ORAL at 15:37

## 2020-01-01 RX ADMIN — MORPHINE SULFATE 10 MILLIGRAM(S): 50 CAPSULE, EXTENDED RELEASE ORAL at 22:43

## 2020-01-01 RX ADMIN — Medication 1 MILLIGRAM(S): at 10:22

## 2020-01-01 RX ADMIN — MORPHINE SULFATE 18 MG/HR: 50 CAPSULE, EXTENDED RELEASE ORAL at 08:27

## 2020-01-01 RX ADMIN — Medication 1300 MILLIGRAM(S): at 05:20

## 2020-01-01 RX ADMIN — METHADONE HYDROCHLORIDE 80 MILLIGRAM(S): 40 TABLET ORAL at 22:07

## 2020-01-01 RX ADMIN — METHADONE HYDROCHLORIDE 80 MILLIGRAM(S): 40 TABLET ORAL at 06:30

## 2020-01-01 RX ADMIN — OXYCODONE HYDROCHLORIDE 30 MILLIGRAM(S): 5 TABLET ORAL at 01:10

## 2020-01-01 RX ADMIN — MIDODRINE HYDROCHLORIDE 2.5 MILLIGRAM(S): 2.5 TABLET ORAL at 11:25

## 2020-01-01 RX ADMIN — Medication 100 MICROGRAM(S): at 06:40

## 2020-01-01 RX ADMIN — Medication 5 MILLIGRAM(S): at 21:21

## 2020-01-01 RX ADMIN — MORPHINE SULFATE 18 MILLIGRAM(S): 50 CAPSULE, EXTENDED RELEASE ORAL at 14:04

## 2020-01-01 RX ADMIN — Medication 1300 MILLIGRAM(S): at 21:09

## 2020-01-01 RX ADMIN — Medication 100 MILLIGRAM(S): at 17:10

## 2020-01-01 RX ADMIN — Medication 1300 MILLIGRAM(S): at 14:09

## 2020-01-01 RX ADMIN — METHADONE HYDROCHLORIDE 80 MILLIGRAM(S): 40 TABLET ORAL at 22:19

## 2020-01-01 RX ADMIN — MORPHINE SULFATE 10 MILLIGRAM(S): 50 CAPSULE, EXTENDED RELEASE ORAL at 04:01

## 2020-01-01 RX ADMIN — METHADONE HYDROCHLORIDE 80 MILLIGRAM(S): 40 TABLET ORAL at 05:05

## 2020-01-01 RX ADMIN — MORPHINE SULFATE 10 MILLIGRAM(S): 50 CAPSULE, EXTENDED RELEASE ORAL at 21:44

## 2020-01-01 RX ADMIN — METHADONE HYDROCHLORIDE 80 MILLIGRAM(S): 40 TABLET ORAL at 21:34

## 2020-01-01 RX ADMIN — HEPARIN SODIUM 5000 UNIT(S): 5000 INJECTION INTRAVENOUS; SUBCUTANEOUS at 05:25

## 2020-01-01 RX ADMIN — Medication 2 MILLIGRAM(S): at 01:53

## 2020-01-01 RX ADMIN — METHADONE HYDROCHLORIDE 80 MILLIGRAM(S): 40 TABLET ORAL at 05:04

## 2020-01-01 RX ADMIN — MORPHINE SULFATE 16 MG/HR: 50 CAPSULE, EXTENDED RELEASE ORAL at 21:23

## 2020-01-01 RX ADMIN — Medication 1 APPLICATION(S): at 05:12

## 2020-01-01 RX ADMIN — OXYCODONE HYDROCHLORIDE 20 MILLIGRAM(S): 5 TABLET ORAL at 06:48

## 2020-01-01 RX ADMIN — METHADONE HYDROCHLORIDE 80 MILLIGRAM(S): 40 TABLET ORAL at 23:14

## 2020-01-01 RX ADMIN — MORPHINE SULFATE 18 MG/HR: 50 CAPSULE, EXTENDED RELEASE ORAL at 23:12

## 2020-01-01 RX ADMIN — METHADONE HYDROCHLORIDE 80 MILLIGRAM(S): 40 TABLET ORAL at 21:03

## 2020-01-01 RX ADMIN — Medication 2000 UNIT(S): at 12:22

## 2020-01-01 RX ADMIN — HEPARIN SODIUM 5000 UNIT(S): 5000 INJECTION INTRAVENOUS; SUBCUTANEOUS at 17:59

## 2020-01-01 RX ADMIN — Medication 250 MILLIGRAM(S): at 15:50

## 2020-01-01 RX ADMIN — OXYCODONE HYDROCHLORIDE 30 MILLIGRAM(S): 5 TABLET ORAL at 13:20

## 2020-01-01 RX ADMIN — MEGESTROL ACETATE 20 MILLIGRAM(S): 40 SUSPENSION ORAL at 21:22

## 2020-01-01 RX ADMIN — Medication 1 APPLICATION(S): at 17:02

## 2020-01-01 RX ADMIN — MEGESTROL ACETATE 20 MILLIGRAM(S): 40 SUSPENSION ORAL at 05:18

## 2020-01-01 RX ADMIN — MORPHINE SULFATE 18 MILLIGRAM(S): 50 CAPSULE, EXTENDED RELEASE ORAL at 23:18

## 2020-01-01 RX ADMIN — METHADONE HYDROCHLORIDE 60 MILLIGRAM(S): 40 TABLET ORAL at 21:03

## 2020-01-01 RX ADMIN — Medication 1 APPLICATION(S): at 12:18

## 2020-01-01 RX ADMIN — METHADONE HYDROCHLORIDE 80 MILLIGRAM(S): 40 TABLET ORAL at 01:17

## 2020-01-01 RX ADMIN — OXYCODONE HYDROCHLORIDE 30 MILLIGRAM(S): 5 TABLET ORAL at 05:06

## 2020-01-01 NOTE — PROGRESS NOTE ADULT - SUBJECTIVE AND OBJECTIVE BOX
Patient is a 59y old  Male who presents with a chief complaint of Cold left lower extremity (31 Dec 2019 09:29)    HPI:  Mr. Booker is a 58 yo male patient with Pmhx of PVD s/p R AKA, paraplegia with multiple chronic bedsores and suprapubic catheter, HTN (not on any medication), CKD stage IV (not following with nephrology), hypothyroidism, opioid dependance, recent admission due to unresponsiveness, presents for 3-4 days of left foot coldness, pain and worsening ulcers.    Patient went today to see Dr. Gooden who sent patient to ED. He noted that he has had left foot ulcers for the last 5 years, course was waxing and waning and was stable with dressing. Lately he felt his wound is getting worse and his foot felt cold. Denies any fever, chills. Patient denying any cp or sob. No GI or  complaints.    In ED, patient afebrile, tachycardic hypertensive. He was given 2L of LR, and one dose of vancomycin. Patient was seen by vascular surgery and arterial doppler US of the right LE was done. Patient will be admitted to the medical service. (26 Nov 2019 21:35)    PAST MEDICAL & SURGICAL HISTORY:  Anemia  PAD (peripheral artery disease)  Hypertension  Suprapubic catheter  Pressure ulcer  Diabetes  Lumbar compression fracture  S/P below knee amputation, right  S/P debridement  Toe amputation status, right  H/O hernia repair    patient seen and examined independently on morning rounds for the first time today, chart reviewed and discussed with the medicine resident on call.     no overnight events--was discharged yesterday but patient refused to leave (appealing discharge)    Vital Signs Last 24 Hrs  T(C): 36.2 (01 Jan 2020 12:30), Max: 36.6 (01 Jan 2020 05:13)  T(F): 97.2 (01 Jan 2020 12:30), Max: 97.9 (01 Jan 2020 05:13)  HR: 78 (01 Jan 2020 12:30) (78 - 91)  BP: 128/76 (01 Jan 2020 12:30) (128/60 - 161/87)  BP(mean): --  RR: 17 (01 Jan 2020 12:30) (17 - 18)  SpO2: --             PE:  GEN-NAD, AAOx3, anxious- talking loudly on phone, +multiple tattoes  PULM- Clear to auscultation bilaterally, fair air entry  CVS- +s1/s2 RRR  GI- soft NT ND +bs, no rebound, no guarding  EXT- no edema               9.4    12.23 )-----------( 289      ( 01 Jan 2020 07:05 )             30.6     01-01    138  |  108  |  49<H>  ----------------------------<  77  6.0<HH>   |  15<L>  |  3.8<H>    Ca    7.6<L>      01 Jan 2020 07:05  Phos  7.1     01-01  Mg     1.7     01-0      MEDICATIONS  (STANDING):  acetaminophen   Tablet .. 650 milliGRAM(s) Oral every 6 hours  amitriptyline 25 milliGRAM(s) Oral at bedtime  chlorhexidine 4% Liquid 1 Application(s) Topical <User Schedule>  cholecalciferol 2000 Unit(s) Oral daily  heparin  Injectable 5000 Unit(s) SubCutaneous every 8 hours  hydrocortisone 20 milliGRAM(s) Oral daily  levothyroxine 100 MICROGram(s) Oral daily  methadone    Tablet 80 milliGRAM(s) Oral every 8 hours  pantoprazole    Tablet 40 milliGRAM(s) Oral two times a day

## 2020-01-01 NOTE — PROGRESS NOTE ADULT - ASSESSMENT
A/P:    #Chronic left foot peripheral artery disease + dry gangrene  -s/p L BKA---BKA closure done on 12/17  -completed abx---post-op local wound care    #recurrent severe hypoglycemia - likely due to severe hypothyroidism vs secondary adrenal insufficiency due to chronic opiates  -cont hydrocortisone 20 mg daily---monitor fs  -f/u with endo as outaptient    #dysphagia- intermittent  -GI following---EGD on 12/27 (clean gastric ulcers, esophagitis and gastritis)--f/u biopsy path results with GI as outaptient  -cont ppi bid    #severe protein calorie malnutrition/decreased PO intake - continue on marinol, patient has good appetite.    #anemia of chronic disease and CKD stage 4 - s/p 1U PRBC during admission, hb stable (today 9.4)    #folic acid deficiency - continue folic acid    #CKDstage 4 - Cr stable--bun/cr today 49/3.8  -f/u with nephrology as outpatient  -avoid nephrotoxic meds    #Hyperkalemia- K 6.0 today--will give 1 dose kayexalate --patient also advised to stop drinkiing gatorade    #HTN -stable- cont current magnemnt    #opioid dependence/abuse - as per chart patient was found hiding his pills in  bed sheets- security called- patient's belongings was checked and pills were found and discarded  by covering nurse - NOW WE ARE OBSERVING HIM TO DRINK ALL THE PILLS, HE IS NOTED TO BE OVERLY SEDATED AT TIMES, AND HIS OXYCODONE DOSE WAS DECREASED TO 3O MG PO EVERY 8 HOURS AS NEEDED, SO PLEASE DO NOT INCREASE HIS DOSE OF OPIOIDS . continue on methadone and oxycodone per pain mngmt.    #chronic sacral bedsores POA - f/u dr. pradhan    #hypothyroidism - w/ TSH above 80, on synthroid, patient is on Synthroid 100 MCG po once daily- f/up TSH level trending down.      #vitamin D deficiency - supplement    #GI/DVT ppx      patient discharged yesterday 12/31/19---patient refusing to leave and undergoing appeal of discharge---case managment aware

## 2020-01-02 NOTE — PROGRESS NOTE ADULT - SUBJECTIVE AND OBJECTIVE BOX
LENGTH OF HOSPITAL STAY: 37d    CHIEF COMPLAINT:   Patient is a 59y old  Male who presents with a chief complaint of Cold left lower extremity (01 Jan 2020 14:07)      Overnight events:    No acute events overnight    ALLERGIES:  sulfamethizole (Unknown)    MEDICATIONS:  STANDING MEDICATIONS  acetaminophen   Tablet .. 650 milliGRAM(s) Oral every 6 hours  amitriptyline 25 milliGRAM(s) Oral at bedtime  chlorhexidine 4% Liquid 1 Application(s) Topical <User Schedule>  cholecalciferol 2000 Unit(s) Oral daily  heparin  Injectable 5000 Unit(s) SubCutaneous every 8 hours  hydrocortisone 20 milliGRAM(s) Oral daily  levothyroxine 100 MICROGram(s) Oral daily  magnesium sulfate  IVPB 2 Gram(s) IV Intermittent once  methadone    Tablet 80 milliGRAM(s) Oral every 8 hours  pantoprazole    Tablet 40 milliGRAM(s) Oral two times a day    PRN MEDICATIONS  diazepam    Tablet 5 milliGRAM(s) Oral two times a day PRN  oxyCODONE    IR 30 milliGRAM(s) Oral every 8 hours PRN    VITALS:   T(F): 96.4  HR: 87  BP: 130/84  RR: 18  SpO2: --    LABS:                        9.8    10.67 )-----------( 283      ( 02 Jan 2020 07:51 )             30.9     01-02    137  |  107  |  50<H>  ----------------------------<  80  5.7<H>   |  17  |  3.8<H>    Ca    7.9<L>      02 Jan 2020 07:51  Phos  7.5     01-02  Mg     1.7     01-02    TPro  4.4<L>  /  Alb  1.5<L>  /  TBili  <0.2  /  DBili  x   /  AST  15  /  ALT  16  /  AlkPhos  148<H>  01-02                    Cultures:      RADIOLOGY:    PHYSICAL EXAM:  GEN: No acute distress  HEENT: SUJIT  LUNGS: Clear to auscultation bilaterally   HEART: S1/S2 present. RRR.   ABD: Soft, non-tender, non-distended. Bowel sounds present  EXT:  R sided BKA noted, new left sided BKA ( dressing c/d/i)  NEURO: AAOX3

## 2020-01-02 NOTE — PROGRESS NOTE ADULT - ASSESSMENT
Mr. Booker is a 60 yo male patient with PMHx of PVD s/p Right AKA months ago, paraplegia with multiple chronic bedsores and suprapubic catheter, HTN (not on any medication), CKD stage IV (not following with nephrology), hypothyroidism, opioid dependance, recent admission due to unresponsiveness, presents for 3-4 days of left foot coldness, pain and worsening ulcers.    # Chronic left foot peripheral artery disease + dry gangrene  - S/p BKA on 12/2/2019. Closure on 12/17/2019.  - Closure was initially delayed because of recurrent hypoglycemic episodes and extensive endocrinal workup.    # Hypoglycemia  - Unremarkable Pitutary protocol MRI of brain and hypoglycemic insulin workup except some adrenal cortisol level changes (For which ENDO was consulted)  - As per Dr. Murguia malnutrition is the cause of hypoglycemia.  - Consulted Dr. Colbert (nutrition support) - Acc to her malnutrition cannot cause hypoglycemia.   - As per Dr. Talbot (Endocrinologist - 2nd opinion) - Pt has never been hypoglycemic as never had any symptoms (even with FS of 23). 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    # Poor PO intake, difficulty swallowing  - dysphagia is not pharyngeal  - GI EGD on 12/27 - esophageal ulcers, biopsy taken follow up OP with Dr. Sanchez    # Hypothyroidism  - antibody work up sent: - thyroglobulin and thyroperoxidase negative thus far  - Thyroid ultrasounds: negative  - levothyroxine to 100 mcg daily  - Will follow TSH in 4 weeks, follow up with endocrinologist as OP    # Sacral OM and gangrene  - Wound care    # SILVINA on CKD + HAGMA - resolved  - non oliguric  - patient is refusing hd , no acute  - can increase if Anion gap increases  - no need for venofer sat in the 30 range, no need for LINDY will start once h <9  - f/u Phos, f/u Mag, f/u bmp    # Normocytic anemia  - Iron deficiency and CKD,  multifactorial  - s/p 1 unit pRBC on 11/27  - Patient denies any melena, or hematochezia, noted history of hemorrhoids    # HTN  - not on any medication  - off Amlodipine  - Echo results 55-60% , grade 1 pritesh dysfunction    # Opioid dependance  - Patient is on methadone 80 mg QID + Oxycodone 30mg QID PRN  - was oversedated on admission, awake after narcan  - high risk for overdose given CKD  - As per attending patient is malingering to gain access to higher doses of oxycodone, which has side effects and contraindicated in his current condition  - patient was found not to drink his pills and hide them in bed shits- security was called- patient's belongings was checked and pills were found and discarted by covering nurse - NOW WE ARE OBSERVING HIM TO DRINK ALL THE PILLS, HE IS NOTED TO BE OVERLY SEDATED AT TIMES, AND HIS OXYCODONE DOSE WAS DECREASED TO 3O MG PO EVERY 8 HOURS AS NEEDED, SO PLEASE DO NOT INCREASE HIS DOSE OF OPIOIDS . continue on methadone and oxycodone per pain mngmt.    # Folate acid deficiency  - on folic acid po qd    # Vitamin D def  - 2000 qd daily    # Chronic sacral bedsore.  - Followed by Dr. Gooden    # Diet: Mechanical soft  # DVT Ppx: hep SC q8  # GI ppx: protonix bid  # Code: FULL   # Dispo: Discharged today with follow up with PCP, Endocrinology, vascular surgeons, pain management  Patient refusing physical home PT   Patient is refusing wound care visiting nurse  Patient is refusing to go to SNF  Patient states he has friends that will take care of him  Patient is refusing to give information of his friends.

## 2020-01-02 NOTE — CHART NOTE - NSCHARTNOTEFT_GEN_A_CORE
Methadone 80mg q8h reordered as it fell off. Previous attending note states to continue on methadone and oxycodone as per pain management.

## 2020-01-02 NOTE — PROGRESS NOTE ADULT - ATTENDING COMMENTS
Mr. Booker is a 58 yo male patient with PMHx of PVD s/p Right AKA months ago, paraplegia with multiple chronic bedsores and suprapubic catheter, HTN (not on any medication), CKD stage IV (not following with nephrology), hypothyroidism, opioid dependance, recent admission due to unresponsiveness, presents for 3-4 days of left foot coldness, pain and worsening ulcers.    # Chronic left foot peripheral artery disease + dry gangrene  - S/p BKA on 12/2/2019. Closure on 12/17/2019.  - Closure was initially delayed because of recurrent hypoglycemic episodes and extensive endocrinal workup.    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    # Poor PO intake, difficulty swallowing  - dysphagia is not pharyngeal  - GI EGD on 12/27 - esophageal ulcers, biopsy taken follow up OP with Dr. Sanchez    # Hypothyroidism  - antibody work up sent: - thyroglobulin and thyroperoxidase negative thus far  - Thyroid ultrasounds: negative  - levothyroxine to 100 mcg daily  - Will follow TSH in 4 weeks, follow up with endocrinologist as OP    # Sacral OM and gangrene  - Wound care    # SILVINA on CKD + HAGMA - resolved  - non oliguric  - patient is refusing hd , no acute  - can increase if Anion gap increases  - no need for venofer sat in the 30 range, no need for LINDY will start once h <9  - f/u Phos, f/u Mag, f/u bmp  CHECK WITH NEPHRO, RECS FOR PHOSLO OR KAYEXALATE PRN TO GO HOME WITH?    # Normocytic anemia  - Iron deficiency and CKD,  multifactorial  - s/p 1 unit pRBC on 11/27  - Patient denies any melena, or hematochezia, noted history of hemorrhoids    # HTN  - not on any medication  - off Amlodipine  - Echo results 55-60% , grade 1 pritesh dysfunction    # Opioid dependance  - Patient is on methadone 80 mg QID + Oxycodone 30mg QID PRN  - was oversedated on admission, awake after narcan  - high risk for overdose given CKD  - patient is malingering to gain access to higher doses of oxycodone, which has side effects and contraindicated in his current condition  - patient was found not to drink his pills and hide them and sell them to his friends- security was called- patient's belongings was checked and pills were found and discarted by covering nurse - NOW WE ARE OBSERVING HIM TO DRINK ALL THE PILLS, HE IS NOTED TO BE OVERLY SEDATED AT TIMES, AND HIS OXYCODONE DOSE WAS DECREASED TO 3O MG PO EVERY 8 HOURS AS NEEDED, SO PLEASE DO NOT INCREASE HIS DOSE OF OPIOIDS . continue on methadone and oxycodone per pain mngmt.    # Folate acid deficiency  - on folic acid po qd    # Vitamin D def  - 2000 qd daily    # Chronic sacral bedsore.  - Followed by Dr. Gooden    # Diet: Mechanical soft  # DVT Ppx: hep SC q8  # GI ppx: protonix bid  # Code: FULL   # Dispo: Discharged already. Appeal in process.   follow up with PCP, Endocrinology, vascular surgeons, pain management  Patient refusing physical home PT   Patient is refusing wound care visiting nurse  Patient is refusing to go to SNF  Patient states he has friends that will take care of him  Patient is refusing to give information of his friends.

## 2020-01-03 NOTE — PROGRESS NOTE ADULT - SUBJECTIVE AND OBJECTIVE BOX
LENGTH OF HOSPITAL STAY: 38d    CHIEF COMPLAINT:   Patient is a 59y old  Male who presents with a chief complaint of Cold left lower extremity (02 Jan 2020 11:21)      Overnight events:    No acute events overnight    ALLERGIES:  sulfamethizole (Unknown)    MEDICATIONS:  STANDING MEDICATIONS  acetaminophen   Tablet .. 650 milliGRAM(s) Oral every 6 hours  amitriptyline 25 milliGRAM(s) Oral at bedtime  calcium acetate 667 milliGRAM(s) Oral three times a day with meals  chlorhexidine 4% Liquid 1 Application(s) Topical <User Schedule>  cholecalciferol 2000 Unit(s) Oral daily  fluconAZOLE   Tablet 100 milliGRAM(s) Oral <User Schedule>  fluconAZOLE   Tablet 50 milliGRAM(s) Oral <User Schedule>  heparin  Injectable 5000 Unit(s) SubCutaneous every 8 hours  hydrocortisone 20 milliGRAM(s) Oral daily  levothyroxine 100 MICROGram(s) Oral daily  methadone    Tablet 80 milliGRAM(s) Oral every 8 hours  pantoprazole    Tablet 40 milliGRAM(s) Oral two times a day    PRN MEDICATIONS  diazepam    Tablet 5 milliGRAM(s) Oral two times a day PRN  oxyCODONE    IR 30 milliGRAM(s) Oral every 8 hours PRN    VITALS:   T(F): 96.8  HR: 87  BP: 132/80  RR: 18  SpO2: 99%    LABS:                        9.4    9.43  )-----------( 274      ( 03 Jan 2020 05:44 )             30.7     01-03    140  |  110  |  54<H>  ----------------------------<  75  5.8<H>   |  15<L>  |  3.8<H>    Ca    7.8<L>      03 Jan 2020 05:44  Phos  7.9     01-03  Mg     2.0     01-03    TPro  4.4<L>  /  Alb  1.5<L>  /  TBili  <0.2  /  DBili  x   /  AST  15  /  ALT  16  /  AlkPhos  148<H>  01-02                    Cultures:      RADIOLOGY:    PHYSICAL EXAM:  GEN: No acute distress  HEENT: SUJIT  LUNGS: Clear to auscultation bilaterally   HEART: S1/S2 present. RRR.   ABD: Soft, non-tender, non-distended. Bowel sounds present  EXT:  R sided BKA noted, new left sided BKA ( dressing c/d/i)  NEURO: AAOX3

## 2020-01-03 NOTE — PROGRESS NOTE ADULT - SUBJECTIVE AND OBJECTIVE BOX
Nephrology progress note    Patient was seen and examined, events over the last 24 h noted .  d/c planing  recalled for hyperK and hyperphos    Allergies:  sulfamethizole (Unknown)    Hospital Medications:   MEDICATIONS  (STANDING):  acetaminophen   Tablet .. 650 milliGRAM(s) Oral every 6 hours  amitriptyline 25 milliGRAM(s) Oral at bedtime  calcium acetate 667 milliGRAM(s) Oral three times a day with meals  chlorhexidine 4% Liquid 1 Application(s) Topical <User Schedule>  cholecalciferol 2000 Unit(s) Oral daily  fluconAZOLE   Tablet 100 milliGRAM(s) Oral <User Schedule>  fluconAZOLE   Tablet 50 milliGRAM(s) Oral <User Schedule>  heparin  Injectable 5000 Unit(s) SubCutaneous every 8 hours  hydrocortisone 20 milliGRAM(s) Oral daily  levothyroxine 100 MICROGram(s) Oral daily  methadone    Tablet 80 milliGRAM(s) Oral every 8 hours  pantoprazole    Tablet 40 milliGRAM(s) Oral two times a day        VITALS:  T(F): 96.6 (01-03-20 @ 12:02), Max: 97.4 (01-02-20 @ 21:00)  HR: 88 (01-03-20 @ 12:02)  BP: 123/76 (01-03-20 @ 12:02)  RR: 18 (01-03-20 @ 05:00)  SpO2: 99% (01-03-20 @ 07:40)  Wt(kg): --    01-01 @ 07:01  -  01-02 @ 07:00  --------------------------------------------------------  IN: 240 mL / OUT: 2930 mL / NET: -2690 mL    01-02 @ 07:01  -  01-03 @ 07:00  --------------------------------------------------------  IN: 0 mL / OUT: 2280 mL / NET: -2280 mL          PHYSICAL EXAM:  GEN: No acute distress  HEENT: SUJIT  LUNGS: Clear to auscultation bilaterally   HEART: S1/S2 present. RRR.   ABD: Soft, non-tender, non-distended. Bowel sounds present  EXT:  R sided BKA noted, new left sided BKA ( dressing c/d/i)  NEURO: AAOX3  LABS:  01-03    140  |  110  |  54<H>  ----------------------------<  75  5.8<H>   |  15<L>  |  3.8<H>    Ca    7.8<L>      03 Jan 2020 05:44  Phos  7.9     01-03  Mg     2.0     01-03    TPro  4.4<L>  /  Alb  1.5<L>  /  TBili  <0.2  /  DBili      /  AST  15  /  ALT  16  /  AlkPhos  148<H>  01-02                          9.4    9.43  )-----------( 274      ( 03 Jan 2020 05:44 )             30.7       Urine Studies:      RADIOLOGY & ADDITIONAL STUDIES:

## 2020-01-03 NOTE — PROGRESS NOTE ADULT - ATTENDING COMMENTS
Mr. Booker is a 58 yo male patient with PMHx of PVD s/p Right AKA months ago, paraplegia with multiple chronic bedsores and suprapubic catheter, HTN (not on any medication), CKD stage IV (not following with nephrology), hypothyroidism, opioid dependance, recent admission due to unresponsiveness, presents for 3-4 days of left foot coldness, pain and worsening ulcers.    # Chronic left foot peripheral artery disease + dry gangrene  - S/p BKA on 12/2/2019. Closure on 12/17/2019.  - Closure was initially delayed because of recurrent hypoglycemic episodes and extensive endocrinal workup.    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    # Poor PO intake, difficulty swallowing  - dysphagia is not pharyngeal  - GI EGD on 12/27 - esophageal ulcers, biopsy taken follow up OP with Dr. Sanchez  Esophageal candidiasis discovered - PO Diflucan to complete treatment course.     # Hypothyroidism  - antibody work up sent: - thyroglobulin and thyroperoxidase negative thus far  - Thyroid ultrasounds: negative  - levothyroxine to 100 mcg daily  - Will follow TSH in 4 weeks, follow up with endocrinologist as OP    # Sacral OM and gangrene  - Wound care    # SILVINA on CKD + HAGMA - resolved  - non oliguric  - patient is refusing hd , no acute  - can increase if Anion gap increases  - no need for venofer sat in the 30 range, no need for LINDY will start once h <9  - f/u Phos, f/u Mag, f/u bmp  CHECK WITH NEPHRO, RECS FOR PHOSLO OR KAYEXALATE PRN TO GO HOME WITH?    # Normocytic anemia  - Iron deficiency and CKD,  multifactorial  - s/p 1 unit pRBC on 11/27  - Patient denies any melena, or hematochezia, noted history of hemorrhoids    # HTN  - not on any medication  - off Amlodipine  - Echo results 55-60% , grade 1 pritesh dysfunction    # Opioid dependance  - Patient is on methadone 80 mg QID + Oxycodone 30mg QID PRN  - was oversedated on admission, awake after narcan  - high risk for overdose given CKD  - patient is malingering to gain access to higher doses of oxycodone, which has side effects and contraindicated in his current condition  - patient was found not to drink his pills and hide them and sell them to his friends- security was called- patient's belongings was checked and pills were found and discarted by covering nurse - NOW WE ARE OBSERVING HIM TO DRINK ALL THE PILLS, HE IS NOTED TO BE OVERLY SEDATED AT TIMES, AND HIS OXYCODONE DOSE WAS DECREASED TO 3O MG PO EVERY 8 HOURS AS NEEDED, SO PLEASE DO NOT INCREASE HIS DOSE OF OPIOIDS . continue on methadone and oxycodone per pain mngmt.    # Folate acid deficiency  - on folic acid po qd    # Vitamin D def  - 2000 qd daily    # Chronic sacral bedsore.  - Followed by Dr. Gooden    # Diet: Mechanical soft  # DVT Ppx: hep SC q8  # GI ppx: protonix bid  # Code: FULL   # Dispo: Discharged already. Appeal in process.   follow up with PCP, Endocrinology, vascular surgeons, pain management  Patient refusing physical home PT   Patient is refusing wound care visiting nurse  Patient is refusing to go to SNF  Patient states he has friends that will take care of him  Patient is refusing to give information of his friends. Mr. Booker is a 58 yo male patient with PMHx of PVD s/p Right AKA months ago, paraplegia with multiple chronic bedsores and suprapubic catheter, HTN (not on any medication), CKD stage IV (not following with nephrology), hypothyroidism, opioid dependance, recent admission due to unresponsiveness, presents for 3-4 days of left foot coldness, pain and worsening ulcers.    # Chronic left foot peripheral artery disease + dry gangrene  - S/p BKA on 12/2/2019. Closure on 12/17/2019.  - Closure was initially delayed because of recurrent hypoglycemic episodes and extensive endocrinal workup.    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    # Poor PO intake, difficulty swallowing  - dysphagia is not pharyngeal  - GI EGD on 12/27 - esophageal ulcers, biopsy taken follow up OP with Dr. Sanchez  Esophageal candidiasis discovered - PO Diflucan to complete treatment course.     # Hypothyroidism  - antibody work up sent: - thyroglobulin and thyroperoxidase negative thus far  - Thyroid ultrasounds: negative  - levothyroxine to 100 mcg daily  - Will follow TSH in 4 weeks, follow up with endocrinologist as OP    # Sacral OM and gangrene  - Wound care    # SILVINA on CKD + HAGMA - resolved  - non oliguric  - patient is refusing hd , no acute  - can increase if Anion gap increases  - no need for venofer sat in the 30 range, no need for LINDY will start once h <9  - f/u Phos, f/u Mag, f/u bmp  CHECK WITH NEPHRO, RECS FOR PHOSLO OR KAYEXALATE PRN TO GO HOME WITH?    # Normocytic anemia  - Iron deficiency and CKD,  multifactorial  - s/p 1 unit pRBC on 11/27  - Patient denies any melena, or hematochezia, noted history of hemorrhoids    # HTN  - not on any medication  - off Amlodipine  - Echo results 55-60% , grade 1 pritesh dysfunction    # Opioid dependance  - Patient is on methadone 80 mg QID + Oxycodone 30mg QID PRN  - was oversedated on admission, awake after narcan  - high risk for overdose given CKD  - patient is malingering to gain access to higher doses of oxycodone, which has side effects and contraindicated in his current condition  - patient was found not to drink his pills and hide them and sell them to his friends- security was called- patient's belongings was checked and pills were found and discarted by covering nurse - NOW WE ARE OBSERVING HIM TO DRINK ALL THE PILLS, HE IS NOTED TO BE OVERLY SEDATED AT TIMES, AND HIS OXYCODONE DOSE WAS DECREASED TO 3O MG PO EVERY 8 HOURS AS NEEDED, SO PLEASE DO NOT INCREASE HIS DOSE OF OPIOIDS . continue on methadone and oxycodone per pain mngmt.    # Folate acid deficiency  - on folic acid po qd    # Vitamin D def  - 2000 qd daily    # Chronic sacral bedsore.  - Followed by Dr. Gooden    # Diet: Mechanical soft  # DVT Ppx: hep SC q8  # GI ppx: protonix bid  # Code: FULL   # Dispo: Discharged already. Lost Appeal. Did 2nd appeal on 1/3. in process.   follow up with PCP, Endocrinology, vascular surgeons, pain management  Patient refusing physical home PT   Patient is refusing wound care visiting nurse  Patient is refusing to go to SNF  Patient states he has friends that will take care of him  Patient is refusing to give information of his friends.

## 2020-01-03 NOTE — PROGRESS NOTE ADULT - ASSESSMENT
60 yo male patient with PMHx of PVD s/p Right AKA months ago, paraplegia with multiple chronic bedsores and suprapubic catheter, HTN (not on any medication), CKD stage IV (not following with nephrology), hypothyroidism, opioid dependance, recent admission due to unresponsiveness, presents for 3-4 days of left foot coldness, pain and worsening ulcer    Last seen by nephrology 12/11  Cr  relativly unchanged sicne then  Adament no HD even to save life  for hyperK can give veltassa 8.4 mg QOD at d/c though pt says he takes enough meds, not interested  for hyperphos can give sevelamer 800 mg TID w/ meals

## 2020-01-03 NOTE — PROGRESS NOTE ADULT - ASSESSMENT
Mr. Booker is a 60 yo male patient with PMHx of PVD s/p Right AKA months ago, paraplegia with multiple chronic bedsores and suprapubic catheter, HTN (not on any medication), CKD stage IV (not following with nephrology), hypothyroidism, opioid dependance, recent admission due to unresponsiveness, presents for 3-4 days of left foot coldness, pain and worsening ulcers.    # Chronic left foot peripheral artery disease + dry gangrene  - S/p BKA on 12/2/2019. Closure on 12/17/2019.  - Closure was initially delayed because of recurrent hypoglycemic episodes and extensive endocrinal workup.    # esophageal candidiasis  - Fluconazole 50mg bid for 14 days    # Hypoglycemia  - Unremarkable Pitutary protocol MRI of brain and hypoglycemic insulin workup except some adrenal cortisol level changes (For which ENDO was consulted)  - As per Dr. Murguia malnutrition is the cause of hypoglycemia.  - Consulted Dr. Colbert (nutrition support) - Acc to her malnutrition cannot cause hypoglycemia.   - As per Dr. Talbot (Endocrinologist - 2nd opinion) - Pt has never been hypoglycemic as never had any symptoms (even with FS of 23). 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    # Poor PO intake, difficulty swallowing  - dysphagia is not pharyngeal  - GI EGD on 12/27 - esophageal ulcers, biopsy taken follow up OP with Dr. Sanchez    # Hypothyroidism  - antibody work up sent: - thyroglobulin and thyroperoxidase negative thus far  - Thyroid ultrasounds: negative  - levothyroxine to 100 mcg daily  - Will follow TSH in 4 weeks, follow up with endocrinologist as OP    # Sacral OM and gangrene  - Wound care    # SILVINA on CKD + HAGMA - resolved  - non oliguric  - patient is refusing hd , no acute  - can increase if Anion gap increases  - no need for venofer sat in the 30 range, no need for LINDY will start once h <9  - f/u Phos, f/u Mag, f/u bmp    # Normocytic anemia  - Iron deficiency and CKD,  multifactorial  - s/p 1 unit pRBC on 11/27  - Patient denies any melena, or hematochezia, noted history of hemorrhoids    # HTN  - not on any medication  - off Amlodipine  - Echo results 55-60% , grade 1 pritesh dysfunction    # Opioid dependance  - Patient is on methadone 80 mg QID + Oxycodone 30mg QID PRN  - was oversedated on admission, awake after narcan  - high risk for overdose given CKD  - As per attending patient is malingering to gain access to higher doses of oxycodone, which has side effects and contraindicated in his current condition  - patient was found not to drink his pills and hide them in bed shits- security was called- patient's belongings was checked and pills were found and discarted by covering nurse - NOW WE ARE OBSERVING HIM TO DRINK ALL THE PILLS, HE IS NOTED TO BE OVERLY SEDATED AT TIMES, AND HIS OXYCODONE DOSE WAS DECREASED TO 3O MG PO EVERY 8 HOURS AS NEEDED, SO PLEASE DO NOT INCREASE HIS DOSE OF OPIOIDS . continue on methadone and oxycodone per pain mngmt.    # Folate acid deficiency  - on folic acid po qd    # Vitamin D def  - 2000 qd daily    # Chronic sacral bedsore.  - Followed by Dr. Gooden    # Diet: Mechanical soft  # DVT Ppx: hep SC q8  # GI ppx: protonix bid  # Code: FULL   # Dispo: Discharged today with follow up with PCP, Endocrinology, vascular surgeons, pain management  Patient refusing physical home PT   Patient is refusing wound care visiting nurse  Patient is refusing to go to SNF  Patient states he has friends that will take care of him  Patient is refusing to give information of his friends.

## 2020-01-04 NOTE — PROGRESS NOTE ADULT - ASSESSMENT
Patient lost his appeal. might do 2nd appeal.   No new changes for him.  During hospitalization below mentioned was his course :     Mr. Booker is a 60 yo male patient with PMHx of PVD s/p Right AKA months ago, paraplegia with multiple chronic bedsores and suprapubic catheter, HTN (not on any medication), CKD stage IV (not following with nephrology), hypothyroidism, opioid dependance, recent admission due to unresponsiveness, presents for 3-4 days of left foot coldness, pain and worsening ulcers.    # Chronic left foot peripheral artery disease + dry gangrene  - S/p BKA on 12/2/2019. Closure on 12/17/2019.  - Closure was initially delayed because of recurrent hypoglycemic episodes and extensive endocrinal workup.    # Hypoglycemia  - - Our DDs are Methadone induced vs adrenal insufficiency  - As per attending Check FS daily, added Hydrocortisone 20mg once a day Pt can follow up any Endocrinologist as an OP  RECOMMENDATIONS: On discharge pt must have sugar pills every time with him and whenever feel dizzy, nauseous or low FS should take these pills and get a follow up with ENDO as OP.    # Poor PO intake, difficulty swallowing  - dysphagia is not pharyngeal  - GI EGD on 12/27 - esophageal ulcers, biopsy taken follow up OP with Dr. Sanhcez  Esophageal candidiasis discovered - PO Diflucan to complete treatment course.     # Hypothyroidism  - antibody work up sent: - thyroglobulin and thyroperoxidase negative thus far  - Thyroid ultrasounds: negative  - levothyroxine to 100 mcg daily  - Will follow TSH in 4 weeks, follow up with endocrinologist as OP    # Sacral OM and gangrene  - Wound care    # SILVINA on CKD + HAGMA - resolved  - non oliguric  - patient is refusing hd , no acute  - can increase if Anion gap increases  - no need for venofer sat in the 30 range, no need for LINDY will start once h <9  - f/u Phos, f/u Mag, f/u bmp  CHECK WITH NEPHRO, RECS FOR PHOSLO OR KAYEXALATE PRN TO GO HOME WITH?    # Normocytic anemia  - Iron deficiency and CKD,  multifactorial  - s/p 1 unit pRBC on 11/27  - Patient denies any melena, or hematochezia, noted history of hemorrhoids    # HTN  - not on any medication  - off Amlodipine  - Echo results 55-60% , grade 1 pritesh dysfunction    # Opioid dependance  - Patient is on methadone 80 mg QID + Oxycodone 30mg QID PRN  - was oversedated on admission, awake after narcan  - high risk for overdose given CKD  - patient is malingering to gain access to higher doses of oxycodone, which has side effects and contraindicated in his current condition  - patient was found not to drink his pills and hide them and sell them to his friends- security was called- patient's belongings was checked and pills were found and discarted by covering nurse - NOW WE ARE OBSERVING HIM TO DRINK ALL THE PILLS, HE IS NOTED TO BE OVERLY SEDATED AT TIMES, AND HIS OXYCODONE DOSE WAS DECREASED TO 3O MG PO EVERY 8 HOURS AS NEEDED, SO PLEASE DO NOT INCREASE HIS DOSE OF OPIOIDS . continue on methadone and oxycodone per pain mngmt.    # Folate acid deficiency  - on folic acid po qd    # Vitamin D def  - 2000 qd daily    # Chronic sacral bedsore.  - Followed by Dr. Gooden    # Diet: Mechanical soft  # DVT Ppx: hep SC q8  # GI ppx: protonix bid  # Code: FULL   # Dispo: Discharged already. Lost Appeal. Did 2nd appeal on 1/3. in process.   follow up with PCP, Endocrinology, vascular surgeons, pain management  Patient refusing physical home PT   Patient is refusing wound care visiting nurse  Patient is refusing to go to SNF  Patient states he has friends that will take care of him  Patient is refusing to give information of his friends.

## 2020-01-04 NOTE — PROGRESS NOTE ADULT - SUBJECTIVE AND OBJECTIVE BOX
S: no newevents    All other pertinent ROS negative.      01-03-20 @ 07:01  -  01-04-20 @ 07:00  --------------------------------------------------------  IN: 0 mL / OUT: 1310 mL / NET: -1310 mL    01-04-20 @ 07:01  - 01-04-20 @ 16:06  --------------------------------------------------------  IN: 240 mL / OUT: 500 mL / NET: -260 mL      Vital Signs Last 24 Hrs  T(C): 35.9 (04 Jan 2020 12:30), Max: 36.3 (03 Jan 2020 21:00)  T(F): 96.6 (04 Jan 2020 12:30), Max: 97.4 (03 Jan 2020 21:00)  HR: 86 (04 Jan 2020 12:30) (86 - 96)  BP: 117/67 (04 Jan 2020 12:30) (100/68 - 139/84)  BP(mean): --  RR: 16 (04 Jan 2020 12:30) (16 - 18)  SpO2: --  PHYSICAL EXAM:    no change from prior  MEDICATIONS:  MEDICATIONS  (STANDING):  acetaminophen   Tablet .. 650 milliGRAM(s) Oral every 6 hours  amitriptyline 25 milliGRAM(s) Oral at bedtime  calcium acetate 667 milliGRAM(s) Oral three times a day with meals  chlorhexidine 4% Liquid 1 Application(s) Topical <User Schedule>  cholecalciferol 2000 Unit(s) Oral daily  fluconAZOLE   Tablet 50 milliGRAM(s) Oral <User Schedule>  heparin  Injectable 5000 Unit(s) SubCutaneous every 8 hours  hydrocortisone 20 milliGRAM(s) Oral daily  levothyroxine 100 MICROGram(s) Oral daily  methadone    Tablet 80 milliGRAM(s) Oral every 8 hours  pantoprazole    Tablet 40 milliGRAM(s) Oral two times a day  sevelamer carbonate 800 milliGRAM(s) Oral three times a day with meals      LABS: All Labs Reviewed:                        9.2    9.00  )-----------( 246      ( 04 Jan 2020 07:16 )             29.6     01-04    139  |  109  |  55<H>  ----------------------------<  66<L>  5.6<H>   |  15<L>  |  3.7<H>    Ca    7.5<L>      04 Jan 2020 07:16  Phos  8.0     01-04  Mg     1.8     01-04            Blood Culture:     Radiology: reviewed      no change from prior

## 2020-01-05 NOTE — CHART NOTE - NSCHARTNOTEFT_GEN_A_CORE
Registered Dietitian Note    Pt signed diet refusal form, please change diet to Regular as per pt's request.  Discussed with Dr. Chavis.

## 2020-01-05 NOTE — PROGRESS NOTE ADULT - SUBJECTIVE AND OBJECTIVE BOX
LENGTH OF HOSPITAL STAY: 40d    CHIEF COMPLAINT:   Patient is a 59y old  Male who presents with a chief complaint of Cold left lower extremity (04 Jan 2020 16:05)      Overnight events:    No acute events overnight    ALLERGIES:  sulfamethizole (Unknown)    MEDICATIONS:  STANDING MEDICATIONS  acetaminophen   Tablet .. 650 milliGRAM(s) Oral every 6 hours  amitriptyline 25 milliGRAM(s) Oral at bedtime  calcium acetate 667 milliGRAM(s) Oral three times a day with meals  chlorhexidine 4% Liquid 1 Application(s) Topical <User Schedule>  cholecalciferol 2000 Unit(s) Oral daily  fluconAZOLE   Tablet 50 milliGRAM(s) Oral <User Schedule>  heparin  Injectable 5000 Unit(s) SubCutaneous every 8 hours  hydrocortisone 20 milliGRAM(s) Oral daily  levothyroxine 100 MICROGram(s) Oral daily  methadone    Tablet 80 milliGRAM(s) Oral every 8 hours  pantoprazole    Tablet 40 milliGRAM(s) Oral two times a day  sevelamer carbonate 800 milliGRAM(s) Oral three times a day with meals    PRN MEDICATIONS  diazepam    Tablet 5 milliGRAM(s) Oral two times a day PRN  oxyCODONE    IR 30 milliGRAM(s) Oral every 8 hours PRN    VITALS:   T(F): 96.3  HR: 86  BP: 135/79  RR: 18  SpO2: --    LABS:                        9.2    11.22 )-----------( 228      ( 05 Jan 2020 06:40 )             30.4     01-05    137  |  105  |  58<H>  ----------------------------<  99  5.8<H>   |  16<L>  |  3.8<H>    Ca    7.6<L>      05 Jan 2020 06:40  Phos  8.1     01-05  Mg     1.6     01-05                      Cultures:      RADIOLOGY:    PHYSICAL EXAM:  GEN: No acute distress  HEENT: SUJIT  LUNGS: Clear to auscultation bilaterally   HEART: S1/S2 present. RRR.   ABD: Soft, non-tender, non-distended. Bowel sounds present  EXT:  R sided BKA noted, new left sided BKA ( dressing c/d/i)  NEURO: AAOX3

## 2020-01-05 NOTE — PROGRESS NOTE ADULT - ASSESSMENT
Mr. Booker is a 60 yo male patient with PMHx of PVD s/p Right AKA months ago, paraplegia with multiple chronic bedsores and suprapubic catheter, HTN (not on any medication), CKD stage IV (not following with nephrology), hypothyroidism, opioid dependance, recent admission due to unresponsiveness, presents for 3-4 days of left foot coldness, pain and worsening ulcers.    # Chronic left foot peripheral artery disease + dry gangrene  - S/p BKA on 12/2/2019. Closure on 12/17/2019.  - Closure was initially delayed because of recurrent hypoglycemic episodes and extensive endocrinal workup.    # esophageal candidiasis  - Fluconazole 50mg bid (renally adjusted) for 14 days    # Hypoglycemia  - Unremarkable Pitutary protocol MRI of brain and hypoglycemic insulin workup except some adrenal cortisol level changes (For which ENDO was consulted)  - As per Dr. Murguia malnutrition is the cause of hypoglycemia.  - Consulted Dr. Colbert (nutrition support) - Acc to her malnutrition cannot cause hypoglycemia.  - As per Dr. Talbot (Endocrinologist - 2nd opinion) - Pt has never been hypoglycemic as never had any symptoms (even with FS of 23). Therefore no need of frequent FS and D5 was recommended   - Our DDs are Methadone induced vs adrenal insufficiency  - As per attending Check FS daily, added Hydrocortisone 20mg once a day Pt can follow up any Endocrinologist as an OP  RECOMMENDATIONS: On discharge pt must have sugar pills every time with him and whenever feel dizzy, nauseous or low FS should take these pills and get a follow up with ENDO as OP.    # Poor PO intake, difficulty swallowing  - dysphagia is not pharyngeal  - GI EGD on 12/27 - esophageal ulcers, biopsy taken - showed Esophageal Candidiasis (treatment started) f/u with Dr. Sanchez as OP for dysphagia if not improved     # Hypothyroidism  - antibody work up sent: - thyroglobulin and thyroperoxidase negative thus far  - Thyroid ultrasounds: negative  - levothyroxine to 100 mcg daily  - Will follow TSH in 3 weeks, follow up with endocrinologist as OP    # Sacral OM and gangrene  - Wound care    # SILVINA on CKD + HAGMA - resolved  - non oliguric  - patient is refusing hd , no acute  - can increase if Anion gap increases  - no need for venofer sat in the 30 range, no need for LINDY will start once h <9  - f/u Phos, f/u Mag, f/u bmp    # Normocytic anemia  - Iron deficiency and CKD,  multifactorial  - s/p 1 unit pRBC on 11/27  - Patient denies any melena, or hematochezia, noted history of hemorrhoids    # HTN  - not on any medication  - off Amlodipine  - Echo results 55-60% , grade 1 pritesh dysfunction    # Opioid dependance  - Patient is on methadone 80 mg QID + Oxycodone 30mg QID PRN  - was oversedated on admission, awake after Narcan  - high risk for overdose given CKD  - As per attending patient is malingering to gain access to higher doses of oxycodone, which has side effects and contraindicated in his current condition  - patient was found not to drink his pills and hide them in bed shits- security was called- patient's belongings was checked and pills were found and discarted by covering nurse - NOW WE ARE OBSERVING HIM TO DRINK ALL THE PILLS, HE IS NOTED TO BE OVERLY SEDATED AT TIMES, AND HIS OXYCODONE DOSE WAS DECREASED TO 3O MG PO EVERY 8 HOURS AS NEEDED, SO PLEASE DO NOT INCREASE HIS DOSE OF OPIOIDS . Continue on methadone and oxycodone per pain mngmt.    # Folate acid deficiency  - on folic acid po qd    # Vitamin D def  - 2000 qd daily    # Chronic sacral bedsore.  - Followed by Dr. Gooden    # Diet: Mechanical soft  # DVT Ppx: hep SC q8  # GI ppx: protonix bid  # Code: FULL   # Dispo: Discharged on 12/31/2019 with follow up with PCP, Endocrinology, vascular surgeons, pain management and GI. Patient appealed and loose once. He is on 2nd appeal now.  Patient refusing physical home PT   Patient is refusing wound care visiting nurse  Patient is refusing to go to SNF  Patient states he has friends that will take care of him  Patient is refusing to give information of his friends. Mr. Booker is a 60 yo male patient with PMHx of PVD s/p Right AKA months ago, paraplegia with multiple chronic bedsores and suprapubic catheter, HTN (not on any medication), CKD stage IV (not following with nephrology), hypothyroidism, opioid dependance, recent admission due to unresponsiveness, presents for 3-4 days of left foot coldness, pain and worsening ulcers.    # Chronic left foot peripheral artery disease + dry gangrene  - S/p BKA on 12/2/2019. Closure on 12/17/2019.  - Closure was initially delayed because of recurrent hypoglycemic episodes and extensive endocrinal workup.    # esophageal candidiasis  - Fluconazole 50mg bid (renally adjusted) for 14 days    # Hypoglycemia  - Unremarkable Pitutary protocol MRI of brain and hypoglycemic insulin workup except some adrenal cortisol level changes (For which ENDO was consulted)  - As per Dr. Murguia malnutrition is the cause of hypoglycemia.  - Consulted Dr. Colbert (nutrition support) - Acc to her malnutrition cannot cause hypoglycemia.  - As per Dr. Talbot (Endocrinologist - 2nd opinion) - Pt has never been hypoglycemic as never had any symptoms (even with FS of 23). Therefore no need of frequent FS and D5 was recommended   - Our DDs are Methadone induced vs adrenal insufficiency  - As per attending Check FS daily, added Hydrocortisone 20mg once a day Pt can follow up any Endocrinologist as an OP  RECOMMENDATIONS: On discharge pt must have sugar pills every time with him and whenever feel dizzy, nauseous or low FS should take these pills and get a follow up with ENDO as OP.    # Poor PO intake, difficulty swallowing  - dysphagia is not pharyngeal  - GI EGD on 12/27 - esophageal ulcers, biopsy taken - showed Esophageal Candidiasis (treatment started) f/u with Dr. Sanchez as OP for dysphagia if not improved     # Hypothyroidism  - antibody work up sent: - thyroglobulin and thyroperoxidase negative thus far  - Thyroid ultrasounds: negative  - levothyroxine to 100 mcg daily  - Will follow TSH in 3 weeks, follow up with endocrinologist as OP    # Sacral OM and gangrene  - Wound care    # SILVINA on CKD + HAGMA - resolved  - non oliguric  - patient is refusing hd , no acute  - can increase if Anion gap increases  - no need for venofer sat in the 30 range, no need for LINDY will start once h <9  - f/u Phos, f/u Mag, f/u bmp    # Normocytic anemia  - Iron deficiency and CKD,  multifactorial  - s/p 1 unit pRBC on 11/27  - Patient denies any melena, or hematochezia, noted history of hemorrhoids    # HTN  - not on any medication  - off Amlodipine  - Echo results 55-60% , grade 1 pritesh dysfunction    # Opioid dependance  - Patient is on methadone 80 mg QID + Oxycodone 30mg QID PRN  - was oversedated on admission, awake after Narcan  - high risk for overdose given CKD  - As per attending patient is malingering to gain access to higher doses of oxycodone, which has side effects and contraindicated in his current condition  - patient was found not to drink his pills and hide them in bed shits- security was called- patient's belongings was checked and pills were found and discarted by covering nurse - NOW WE ARE OBSERVING HIM TO DRINK ALL THE PILLS, HE IS NOTED TO BE OVERLY SEDATED AT TIMES, AND HIS OXYCODONE DOSE WAS DECREASED TO 3O MG PO EVERY 8 HOURS AS NEEDED, SO PLEASE DO NOT INCREASE HIS DOSE OF OPIOIDS . Continue on methadone and oxycodone per pain mngmt.    # Folate acid deficiency  - on folic acid po qd    # Vitamin D def  - 2000 qd daily    # Chronic sacral bedsore.  - Followed by Dr. Gooden    # Diet: Renal restricted recommended - But Patient is refusing it and signed diet refusal form - so now he is on regular diet.  # DVT Ppx: hep SC q8  # GI ppx: protonix bid  # Code: FULL   # Dispo: Discharged on 12/31/2019 with follow up with PCP, Endocrinology, vascular surgeons, pain management and GI. Patient appealed and loose once. He is on 2nd appeal now.  Patient refusing physical home PT   Patient is refusing wound care visiting nurse  Patient is refusing to go to SNF  Patient states he has friends that will take care of him  Patient is refusing to give information of his friends.

## 2020-01-06 NOTE — PROGRESS NOTE ADULT - ATTENDING COMMENTS
Pt seen and examined independently.   No new complaints.  K 6.2 and has been chronically elevated per chart review.  He was offered HD by nephrology but refuses and he is aware of the risks.  s/p dextrose and insulin with mild improvement  Renal recommends V[place] 8.4g q48hr on discharge.  Pt will likely have bed in SNF tomorrow and he is agreeable.  Discharge already in second appeals.  Low K diet  Pt with occasional low FS but asymptomatic - last Endocrine note reviewed who does not recommend any further workup inpt.  on hydrocortisone  Overall poor prognosis if pt continues to refuse HD as outpt.    I discussed the case with the resident and I reviewed his note.  Agree with the physical exam, assessment and plan with additions and corrections as above.    PROGRESS NOTE HANDOFF    Pending: BMP in AM    Disposition: [facility]

## 2020-01-06 NOTE — PROGRESS NOTE ADULT - SUBJECTIVE AND OBJECTIVE BOX
Hospital Day:  41d    Chief Complaint: Patient is a 59y old  Male who presents with a chief complaint of Cold left lower extremity (05 Jan 2020 10:29)    24 hour events:  -No acute events overnight   -Patient currently in process of second appeal to discharge    Subjective:  -No new complaints this AM, resting comfortably in bed eating breakfast    Past Medical Hx:   Anemia  PAD (peripheral artery disease)  Hypertension  Suprapubic catheter  Pressure ulcer  Diabetes  Lumbar compression fracture    Past Sx:  S/P below knee amputation, right  S/P debridement  Toe amputation status, right  H/O hernia repair  No significant past surgical history    Allergies:  sulfamethizole (Unknown)    Current Meds:   Standing Meds:  acetaminophen   Tablet .. 650 milliGRAM(s) Oral every 6 hours  amitriptyline 25 milliGRAM(s) Oral at bedtime  calcium acetate 667 milliGRAM(s) Oral three times a day with meals  chlorhexidine 4% Liquid 1 Application(s) Topical <User Schedule>  cholecalciferol 2000 Unit(s) Oral daily  fluconAZOLE   Tablet 50 milliGRAM(s) Oral <User Schedule>  heparin  Injectable 5000 Unit(s) SubCutaneous every 8 hours  hydrocortisone 20 milliGRAM(s) Oral daily  levothyroxine 100 MICROGram(s) Oral daily  methadone    Tablet 80 milliGRAM(s) Oral every 8 hours  pantoprazole    Tablet 40 milliGRAM(s) Oral two times a day  sevelamer carbonate 800 milliGRAM(s) Oral three times a day with meals    PRN Meds:  diazepam    Tablet 5 milliGRAM(s) Oral two times a day PRN anxiety  oxyCODONE    IR 30 milliGRAM(s) Oral every 8 hours PRN Severe Pain (7 - 10)    HOME MEDICATIONS:  diazePAM 10 mg oral tablet: 1 tab(s) orally 2 times a day  methadone: 80 milligram(s) orally 4 times a day  oxybutynin 10 mg/24 hr oral tablet, extended release: 1 tab(s) orally 2 times a day  OxyCONTIN 60 mg oral tablet, extended release: 1 tab(s) orally 4 times a day      Vital Signs:   T(F): 97.1 (01-06-20 @ 05:19), Max: 97.1 (01-06-20 @ 05:19)  HR: 91 (01-06-20 @ 05:19) (91 - 91)  BP: 142/94 (01-06-20 @ 05:19) (142/94 - 143/74)  RR: 18 (01-06-20 @ 05:19) (18 - 18)  SpO2: --      01-05-20 @ 07:01  -  01-06-20 @ 07:00  --------------------------------------------------------  IN: 0 mL / OUT: 3500 mL / NET: -3500 mL    Physical Exam:   GENERAL: NAD  HEENT: NCAT  CHEST/LUNG: CTAB  HEART: Regular rate and rhythm; s1 s2 appreciated, No murmurs, rubs, or gallops  ABDOMEN: Soft, Nontender, Nondistended; Bowel sounds present  EXTREMITIES: No LE edema b/l  NERVOUS SYSTEM:  Alert & Oriented X3    Labs:                         9.6    11.10 )-----------( 231      ( 06 Jan 2020 00:00 )             30.7     Neutophil% 55.8, Lymphocyte% 34.2, Monocyte% 5.0, Bands% 0.3 01-06-20 @ 00:00    06 Jan 2020 00:00    135    |  106    |  59     ----------------------------<  52     6.2     |  15     |  4.1      Ca    7.8        06 Jan 2020 00:00  Phos  8.3       06 Jan 2020 00:00  Mg     1.6       06 Jan 2020 00:00      Radiology:     Assessment and Plan:   Mr. Booker is a 60 yo male patient with PMHx of PVD s/p Right AKA months ago, paraplegia with multiple chronic bedsores and suprapubic catheter, HTN (not on any medication), CKD stage IV (not following with nephrology), hypothyroidism, opioid dependance, recent admission due to unresponsiveness, presents for 3-4 days of left foot coldness, pain and worsening ulcers.    # Chronic left foot peripheral artery disease + dry gangrene  - S/p BKA on 12/2/2019. Closure on 12/17/2019.  - Closure was initially delayed because of recurrent hypoglycemic episodes and extensive endocrinal workup.    # esophageal candidiasis  - Fluconazole 50mg bid (renally adjusted) for 14 days    # Hypoglycemia  - Unremarkable Pitutary protocol MRI of brain and hypoglycemic insulin workup except some adrenal cortisol level changes (For which ENDO was consulted)  - As per Dr. Murguia malnutrition is the cause of hypoglycemia.  - Consulted Dr. Colbert (nutrition support) - Acc to her malnutrition cannot cause hypoglycemia.  - As per Dr. Talbot (Endocrinologist - 2nd opinion) - Pt has never been hypoglycemic as never had any symptoms (even with FS of 23). Therefore no need of frequent FS and D5 was recommended   - Our DDs are Methadone induced vs adrenal insufficiency  - As per attending Check FS daily, added Hydrocortisone 20mg once a day Pt can follow up any Endocrinologist as an OP  RECOMMENDATIONS: On discharge pt must have sugar pills every time with him and whenever feel dizzy, nauseous or low FS should take these pills and get a follow up with ENDO as OP.    # Poor PO intake, difficulty swallowing  - dysphagia is not pharyngeal  - GI EGD on 12/27 - esophageal ulcers, biopsy taken - showed Esophageal Candidiasis (treatment started) f/u with Dr. Sanchez as OP for dysphagia if not improved     # Hypothyroidism  - antibody work up sent: - thyroglobulin and thyroperoxidase negative thus far  - Thyroid ultrasounds: negative  - levothyroxine to 100 mcg daily  - Will follow TSH in 3 weeks, follow up with endocrinologist as OP    # Sacral OM and gangrene  - Wound care    # SILVINA on CKD + HAGMA - resolved  - non oliguric  - patient is refusing hd , no acute  - can increase if Anion gap increases  - no need for venofer sat in the 30 range, no need for LINDY will start once h <9  - f/u Phos, f/u Mag, f/u bmp    # Normocytic anemia  - Iron deficiency and CKD,  multifactorial  - s/p 1 unit pRBC on 11/27  - Patient denies any melena, or hematochezia, noted history of hemorrhoids    # HTN  - not on any medication  - off Amlodipine  - Echo results 55-60% , grade 1 pritesh dysfunction    # Opioid dependance  - Patient is on methadone 80 mg QID + Oxycodone 30mg QID PRN  - was oversedated on admission, awake after Narcan  - high risk for overdose given CKD  - As per attending patient is malingering to gain access to higher doses of oxycodone, which has side effects and contraindicated in his current condition  - patient was found not to drink his pills and hide them in bed shits- security was called- patient's belongings was checked and pills were found and discarted by covering nurse - NOW WE ARE OBSERVING HIM TO DRINK ALL THE PILLS, HE IS NOTED TO BE OVERLY SEDATED AT TIMES, AND HIS OXYCODONE DOSE WAS DECREASED TO 3O MG PO EVERY 8 HOURS AS NEEDED, SO PLEASE DO NOT INCREASE HIS DOSE OF OPIOIDS . Continue on methadone and oxycodone per pain mngmt.    # Folate acid deficiency  - on folic acid po qd    # Vitamin D def  - 2000 qd daily    # Chronic sacral bedsore.  - Followed by Dr. Gooden    # Diet: Renal restricted recommended - But Patient is refusing it and signed diet refusal form - so now he is on regular diet.  # DVT Ppx: hep SC q8  # GI ppx: protonix bid  # Code: FULL   # Dispo: Discharged on 12/31/2019 with follow up with PCP, Endocrinology, vascular surgeons, pain management and GI. Patient appealed and loose once. He is on 2nd appeal now.  Patient refusing physical home PT   Patient is refusing wound care visiting nurse  Patient is refusing to go to SNF  Patient states he has friends that will take care of him  Patient is refusing to give information of his friends. Hospital Day:  41d    Chief Complaint: Patient is a 59y old  Male who presents with a chief complaint of Cold left lower extremity (05 Jan 2020 10:29)    24 hour events:  -No acute events overnight   -Patient currently in process of second appeal to discharge  -Potassium elevated to 6.2 this AM    Subjective:  -No new complaints this AM, resting comfortably in bed eating breakfast    Past Medical Hx:   Anemia  PAD (peripheral artery disease)  Hypertension  Suprapubic catheter  Pressure ulcer  Diabetes  Lumbar compression fracture    Past Sx:  S/P below knee amputation, right  S/P debridement  Toe amputation status, right  H/O hernia repair  No significant past surgical history    Allergies:  sulfamethizole (Unknown)    Current Meds:   Standing Meds:  acetaminophen   Tablet .. 650 milliGRAM(s) Oral every 6 hours  amitriptyline 25 milliGRAM(s) Oral at bedtime  calcium acetate 667 milliGRAM(s) Oral three times a day with meals  chlorhexidine 4% Liquid 1 Application(s) Topical <User Schedule>  cholecalciferol 2000 Unit(s) Oral daily  fluconAZOLE   Tablet 50 milliGRAM(s) Oral <User Schedule>  heparin  Injectable 5000 Unit(s) SubCutaneous every 8 hours  hydrocortisone 20 milliGRAM(s) Oral daily  levothyroxine 100 MICROGram(s) Oral daily  methadone    Tablet 80 milliGRAM(s) Oral every 8 hours  pantoprazole    Tablet 40 milliGRAM(s) Oral two times a day  sevelamer carbonate 800 milliGRAM(s) Oral three times a day with meals    PRN Meds:  diazepam    Tablet 5 milliGRAM(s) Oral two times a day PRN anxiety  oxyCODONE    IR 30 milliGRAM(s) Oral every 8 hours PRN Severe Pain (7 - 10)    HOME MEDICATIONS:  diazePAM 10 mg oral tablet: 1 tab(s) orally 2 times a day  methadone: 80 milligram(s) orally 4 times a day  oxybutynin 10 mg/24 hr oral tablet, extended release: 1 tab(s) orally 2 times a day  OxyCONTIN 60 mg oral tablet, extended release: 1 tab(s) orally 4 times a day      Vital Signs:   T(F): 97.1 (01-06-20 @ 05:19), Max: 97.1 (01-06-20 @ 05:19)  HR: 91 (01-06-20 @ 05:19) (91 - 91)  BP: 142/94 (01-06-20 @ 05:19) (142/94 - 143/74)  RR: 18 (01-06-20 @ 05:19) (18 - 18)  SpO2: --      01-05-20 @ 07:01  -  01-06-20 @ 07:00  --------------------------------------------------------  IN: 0 mL / OUT: 3500 mL / NET: -3500 mL    Physical Exam:   GENERAL: NAD  HEENT: NCAT  CHEST/LUNG: CTAB  HEART: Regular rate and rhythm; s1 s2 appreciated, No murmurs, rubs, or gallops  ABDOMEN: Soft, Nontender, Nondistended; Bowel sounds present  EXTREMITIES: No LE edema b/l. bilateral below knee amputations  NERVOUS SYSTEM:  Alert & Oriented X3    Labs:                         9.6    11.10 )-----------( 231      ( 06 Jan 2020 00:00 )             30.7     Neutophil% 55.8, Lymphocyte% 34.2, Monocyte% 5.0, Bands% 0.3 01-06-20 @ 00:00    06 Jan 2020 00:00    135    |  106    |  59     ----------------------------<  52     6.2     |  15     |  4.1      Ca    7.8        06 Jan 2020 00:00  Phos  8.3       06 Jan 2020 00:00  Mg     1.6       06 Jan 2020 00:00      Radiology:     Assessment and Plan:   Mr. Booker is a 60 yo male patient with PMHx of PVD s/p Right AKA months ago, paraplegia with multiple chronic bedsores and suprapubic catheter, HTN (not on any medication), CKD stage IV (not following with nephrology), hypothyroidism, opioid dependance, recent admission due to unresponsiveness, presents for 3-4 days of left foot coldness, pain and worsening ulcers.    #hyperkalemia: 6.2 this AM  -administer 250mL of D10 + insulin 10u regular IV now  -repeat BMP at 11am    # Chronic left foot peripheral artery disease + dry gangrene  - S/p BKA on 12/2/2019. Closure on 12/17/2019.  - Closure was initially delayed because of recurrent hypoglycemic episodes and extensive endocrinal workup.    # esophageal candidiasis  - Fluconazole 50mg bid (renally adjusted) for 14 days    # Hypoglycemia  - Unremarkable Pitutary protocol MRI of brain and hypoglycemic insulin workup except some adrenal cortisol level changes (For which ENDO was consulted)  - As per Dr. Murguia malnutrition is the cause of hypoglycemia.  - Consulted Dr. Colbert (nutrition support) - Acc to her malnutrition cannot cause hypoglycemia.  - As per Dr. Talbot (Endocrinologist - 2nd opinion) - Pt has never been hypoglycemic as never had any symptoms (even with FS of 23). Therefore no need of frequent FS and D5 was recommended   - Our DDs are Methadone induced vs adrenal insufficiency  - As per attending Check FS daily, added Hydrocortisone 20mg once a day Pt can follow up any Endocrinologist as an OP  RECOMMENDATIONS: On discharge pt must have sugar pills every time with him and whenever feel dizzy, nauseous or low FS should take these pills and get a follow up with ENDO as OP.    # Poor PO intake, difficulty swallowing  - dysphagia is not pharyngeal  - GI EGD on 12/27 - esophageal ulcers, biopsy taken - showed Esophageal Candidiasis (treatment started) f/u with Dr. Sanchez as OP for dysphagia if not improved     # Hypothyroidism  - antibody work up sent: - thyroglobulin and thyroperoxidase negative thus far  - Thyroid ultrasounds: negative  - levothyroxine to 100 mcg daily  - Will follow TSH in 3 weeks, follow up with endocrinologist as OP    # Sacral OM and gangrene  - Wound care    # SILVINA on CKD + HAGMA - resolved  - non oliguric  - patient is refusing hd , no acute  - can increase if Anion gap increases  - no need for venofer sat in the 30 range, no need for LINDY will start once h <9  - f/u Phos, f/u Mag, f/u bmp    # Normocytic anemia  - Iron deficiency and CKD,  multifactorial  - s/p 1 unit pRBC on 11/27  - Patient denies any melena, or hematochezia, noted history of hemorrhoids    # HTN  - not on any medication  - off Amlodipine  - Echo results 55-60% , grade 1 pritesh dysfunction    # Opioid dependance  - Patient is on methadone 80 mg QID + Oxycodone 30mg QID PRN  - was oversedated on admission, awake after Narcan  - high risk for overdose given CKD  - As per attending patient is malingering to gain access to higher doses of oxycodone, which has side effects and contraindicated in his current condition  - patient was found not to drink his pills and hide them in bed shits- security was called- patient's belongings was checked and pills were found and discarted by covering nurse - NOW WE ARE OBSERVING HIM TO DRINK ALL THE PILLS, HE IS NOTED TO BE OVERLY SEDATED AT TIMES, AND HIS OXYCODONE DOSE WAS DECREASED TO 3O MG PO EVERY 8 HOURS AS NEEDED, SO PLEASE DO NOT INCREASE HIS DOSE OF OPIOIDS . Continue on methadone and oxycodone per pain mngmt.    # Folate acid deficiency  - on folic acid po qd    # Vitamin D def  - 2000 qd daily    # Chronic sacral bedsore.  - Followed by Dr. Gooden    # Diet: Renal restricted recommended - But Patient is refusing it and signed diet refusal form - so now he is on regular diet.  # DVT Ppx: hep SC q8  # GI ppx: protonix bid  # Code: FULL   # Dispo: Discharged on 12/31/2019 with follow up with PCP, Endocrinology, vascular surgeons, pain management and GI. Patient appealed and loose once. He is on 2nd appeal now.  Patient refusing physical home PT   Patient is refusing wound care visiting nurse  Patient is refusing to go to SNF  Patient states he has friends that will take care of him  Patient is refusing to give information of his friends. Hospital Day:  41d    Chief Complaint: Patient is a 59y old  Male who presents with a chief complaint of Cold left lower extremity (05 Jan 2020 10:29)    24 hour events:  -No acute events overnight   -Patient currently in process of second appeal to discharge  -Potassium elevated to 6.2 this AM    Subjective:  -No new complaints this AM, resting comfortably in bed eating breakfast    Past Medical Hx:   Anemia  PAD (peripheral artery disease)  Hypertension  Suprapubic catheter  Pressure ulcer  Diabetes  Lumbar compression fracture    Past Sx:  S/P below knee amputation, right  S/P debridement  Toe amputation status, right  H/O hernia repair  No significant past surgical history    Allergies:  sulfamethizole (Unknown)    Current Meds:   Standing Meds:  acetaminophen   Tablet .. 650 milliGRAM(s) Oral every 6 hours  amitriptyline 25 milliGRAM(s) Oral at bedtime  calcium acetate 667 milliGRAM(s) Oral three times a day with meals  chlorhexidine 4% Liquid 1 Application(s) Topical <User Schedule>  cholecalciferol 2000 Unit(s) Oral daily  fluconAZOLE   Tablet 50 milliGRAM(s) Oral <User Schedule>  heparin  Injectable 5000 Unit(s) SubCutaneous every 8 hours  hydrocortisone 20 milliGRAM(s) Oral daily  levothyroxine 100 MICROGram(s) Oral daily  methadone    Tablet 80 milliGRAM(s) Oral every 8 hours  pantoprazole    Tablet 40 milliGRAM(s) Oral two times a day  sevelamer carbonate 800 milliGRAM(s) Oral three times a day with meals    PRN Meds:  diazepam    Tablet 5 milliGRAM(s) Oral two times a day PRN anxiety  oxyCODONE    IR 30 milliGRAM(s) Oral every 8 hours PRN Severe Pain (7 - 10)    HOME MEDICATIONS:  diazePAM 10 mg oral tablet: 1 tab(s) orally 2 times a day  methadone: 80 milligram(s) orally 4 times a day  oxybutynin 10 mg/24 hr oral tablet, extended release: 1 tab(s) orally 2 times a day  OxyCONTIN 60 mg oral tablet, extended release: 1 tab(s) orally 4 times a day      Vital Signs:   T(F): 97.1 (01-06-20 @ 05:19), Max: 97.1 (01-06-20 @ 05:19)  HR: 91 (01-06-20 @ 05:19) (91 - 91)  BP: 142/94 (01-06-20 @ 05:19) (142/94 - 143/74)  RR: 18 (01-06-20 @ 05:19) (18 - 18)  SpO2: --      01-05-20 @ 07:01  -  01-06-20 @ 07:00  --------------------------------------------------------  IN: 0 mL / OUT: 3500 mL / NET: -3500 mL    Physical Exam:   GENERAL: NAD  HEENT: NCAT  CHEST/LUNG: CTAB  HEART: Regular rate and rhythm; s1 s2 appreciated, No murmurs, rubs, or gallops  ABDOMEN: Soft, Nontender, Nondistended; Bowel sounds present  EXTREMITIES: No LE edema b/l. bilateral below knee amputations  NERVOUS SYSTEM:  Alert & Oriented X3    Labs:                         9.6    11.10 )-----------( 231      ( 06 Jan 2020 00:00 )             30.7     Neutophil% 55.8, Lymphocyte% 34.2, Monocyte% 5.0, Bands% 0.3 01-06-20 @ 00:00    06 Jan 2020 00:00    135    |  106    |  59     ----------------------------<  52     6.2     |  15     |  4.1      Ca    7.8        06 Jan 2020 00:00  Phos  8.3       06 Jan 2020 00:00  Mg     1.6       06 Jan 2020 00:00      Radiology:     Assessment and Plan:   Mr. Booker is a 58 yo male patient with PMHx of PVD s/p Right AKA months ago, paraplegia with multiple chronic bedsores and suprapubic catheter, HTN (not on any medication), CKD stage IV (not following with nephrology), hypothyroidism, opioid dependance, recent admission due to unresponsiveness, presents for 3-4 days of left foot coldness, pain and worsening ulcers.    #hyperkalemia: 6.2 this AM  -administer 250mL of D10 + insulin 10u regular IV now  -repeat BMP at 11am    # Chronic left foot peripheral artery disease + dry gangrene  - S/p BKA on 12/2/2019. Closure on 12/17/2019.  - Closure was initially delayed because of recurrent hypoglycemic episodes and extensive endocrinal workup.    # esophageal candidiasis  - Fluconazole 50mg bid (renally adjusted) for 14 days    # Hypoglycemia  - Unremarkable Pitutary protocol MRI of brain and hypoglycemic insulin workup except some adrenal cortisol level changes (For which ENDO was consulted)  - As per Dr. Murguia malnutrition is the cause of hypoglycemia.  - Consulted Dr. Colbert (nutrition support) - Acc to her malnutrition cannot cause hypoglycemia.  - As per Dr. Talbot (Endocrinologist - 2nd opinion) - Pt has never been hypoglycemic as never had any symptoms (even with FS of 23). Therefore no need of frequent FS and D5 was recommended   - Our DDs are Methadone induced vs adrenal insufficiency  - As per attending Check FS daily, added Hydrocortisone 20mg once a day Pt can follow up any Endocrinologist as an OP  RECOMMENDATIONS: On discharge pt must have sugar pills every time with him and whenever feel dizzy, nauseous or low FS should take these pills and get a follow up with ENDO as OP.    # Poor PO intake, difficulty swallowing  - dysphagia is not pharyngeal  - GI EGD on 12/27 - esophageal ulcers, biopsy taken - showed Esophageal Candidiasis (treatment started) f/u with Dr. Sanchez as OP for dysphagia if not improved     # Hypothyroidism  - antibody work up sent: - thyroglobulin and thyroperoxidase negative thus far  - Thyroid ultrasounds: negative  - levothyroxine to 100 mcg daily  - Will follow TSH in 3 weeks, follow up with endocrinologist as OP    # Sacral OM and gangrene  - Wound care    # SILVINA on CKD + HAGMA - resolved  - non oliguric  - patient is refusing hd , no acute  - can increase if Anion gap increases  - no need for venofer sat in the 30 range, no need for LIDNY will start once h <9  - f/u Phos, f/u Mag, f/u bmp    # Normocytic anemia  - Iron deficiency and CKD,  multifactorial  - s/p 1 unit pRBC on 11/27  - Patient denies any melena, or hematochezia, noted history of hemorrhoids    # HTN  - not on any medication  - off Amlodipine  - Echo results 55-60% , grade 1 pritesh dysfunction    # Opioid dependance  - Patient was on methadone 80 mg QID + Oxycodone 30mg QID PRN  - was oversedated on admission, awake after Narcan  - high risk for overdose given CKD  - As per attending patient is malingering to gain access to higher doses of oxycodone, which has side effects and contraindicated in his current condition  - patient was found not to drink his pills and hide them in bed sheets- security was called- patient's belongings was checked and pills were found and discarded by covering nurse - NOW WE ARE OBSERVING HIM TO DRINK ALL THE PILLS, HE IS NOTED TO BE OVERLY SEDATED AT TIMES, AND HIS OXYCODONE DOSE WAS DECREASED TO 3O MG PO EVERY 8 HOURS AS NEEDED, SO PLEASE DO NOT INCREASE HIS DOSE OF OPIOIDS . Continue on methadone and oxycodone per pain mngmt.    # Folate acid deficiency  - on folic acid po qd    # Vitamin D def  - 2000 qd daily    # Chronic sacral bedsore.  - Followed by Dr. Gooden    # Diet: Renal restricted recommended - But Patient is refusing it and signed diet refusal form - so now he is on regular diet.  # DVT Ppx: hep SC q8  # GI ppx: protonix bid  # Code: FULL   # Dispo: Discharged on 12/31/2019 with follow up with PCP, Endocrinology, vascular surgeons, pain management and GI. Patient appealed and loose once. He is on 2nd appeal now.  Patient refusing physical home PT   Patient is refusing wound care visiting nurse  Patient is refusing to go to SNF  Patient states he has friends that will take care of him  Patient is refusing to give information of his friends.

## 2020-01-06 NOTE — PROGRESS NOTE ADULT - REASON FOR ADMISSION
Cold left lower extremity

## 2020-01-07 NOTE — ED ADULT TRIAGE NOTE - CHIEF COMPLAINT QUOTE
BIBA with complaints of pain to bilateral stupms, back, requesting Methadone, sent from nursing home

## 2020-01-07 NOTE — CHART NOTE - NSCHARTNOTEFT_GEN_A_CORE
RESIDENT DISCHARGE NOTE     Patient Name: MARGE BELLA  MRN-[id number]    VITAL SIGNS:  T(F): 97.4 (01-07-20 @ 05:21), Max: 97.4 (01-07-20 @ 05:21)  HR: 89 (01-07-20 @ 05:21)  BP: 146/76 (01-07-20 @ 05:21)  SpO2: --      PHYSICAL EXAMINATION:  CONSTITUTIONAL: No acute distress, well-developed, well-groomed, AAOx3  HEAD: Atraumatic, normocephalic  EYES: EOM intact, PERRLA, conjunctiva and sclera clear  ENT: Supple, no masses, no thyromegaly, no bruits, no JVD; moist mucous membranes  PULMONARY: Clear to auscultation bilaterally; no wheezes, rales, or rhonchi  CARDIOVASCULAR: Regular rate and rhythm; no murmurs, rubs, or gallops  GASTROINTESTINAL: Soft, non-tender, non-distended; bowel sounds present  MUSCULOSKELETAL: 2+ peripheral pulses; no clubbing, no cyanosis, no edema. bilateral below knee amputations  NEUROLOGY: non-focal  SKIN: No rashes or lesions; warm and dry    TEST RESULTS:                        9.6    11.10 )-----------( 231      ( 06 Jan 2020 00:00 )             30.7     01-06    139  |  108  |  63<HH>  ----------------------------<  53<L>  5.9<H>   |  15<L>  |  3.7<H>    Ca    7.7<L>      06 Jan 2020 11:50  Phos  8.3     01-06  Mg     1.6     01-06        FINAL DISCHARGE INTERVIEW:  Resident Present: Emma Lehman MD  RN Present:     DISCHARGE MEDICATION RECONCILIATION:  Reviewed with Attending (Name: Dr. Sorensen)    DISPOSITION:  [  ] Home  [  ] Home with Visiting Nursing Services  [ X ] SNF/ NH: [facility]  [  ] Acute Rehab (4A)  [  ] Other (Specify:_________) RESIDENT DISCHARGE NOTE     Patient Name: MARGE BELLA  MRN-[id number]    VITAL SIGNS:  T(F): 97.4 (01-07-20 @ 05:21), Max: 97.4 (01-07-20 @ 05:21)  HR: 89 (01-07-20 @ 05:21)  BP: 146/76 (01-07-20 @ 05:21)  SpO2: --      PHYSICAL EXAMINATION:  CONSTITUTIONAL: No acute distress, well-developed, awake, alert  HEAD: Atraumatic, normocephalic  EYES: anicter  ENT: Supple  PULMONARY: Clear to auscultation bilaterally; no wheezes, rales, or rhonchi  CARDIOVASCULAR: Regular rate and rhythm; no murmurs, rubs, or gallops  GASTROINTESTINAL: Soft, non-tender, non-distended; bowel sounds present  MUSCULOSKELETAL: 2+ peripheral pulses; no clubbing, no cyanosis, no edema. bilateral below knee amputations  NEUROLOGY: non-focal  SKIN: No rashes or lesions; warm and dry    TEST RESULTS:                        9.6    11.10 )-----------( 231      ( 06 Jan 2020 00:00 )             30.7     01-06    139  |  108  |  63<HH>  ----------------------------<  53<L>  5.9<H>   |  15<L>  |  3.7<H>    Ca    7.7<L>      06 Jan 2020 11:50  Phos  8.3     01-06  Mg     1.6     01-06        FINAL DISCHARGE INTERVIEW:  Resident Present: Emma Lehman MD  RN Present:     DISCHARGE MEDICATION RECONCILIATION:  Reviewed with Attending (Name: Dr. Sorensen)    DISPOSITION:  [  ] Home  [  ] Home with Visiting Nursing Services  [ X ] SN[facility]: [facility]  [  ] Acute Rehab (4A)  [  ] Other (Specify:_________)      Attending note:    Pt seen today. No new complaints.  FS reviewed - no symptoms with low BS.  Pt agreeable to go to NH today.  Explained to him that he will discharged on the same dose of narcotics that he is being prescribed here.   Valtressa on discharge.  Medication reconciliation reviewed with resident.

## 2020-01-08 NOTE — ED PROVIDER NOTE - ATTENDING CONTRIBUTION TO CARE
I personally evaluated the patient. I reviewed the Resident’s or Physician Assistant’s note (as assigned above), and agree with the findings and plan except as documented in my note.    60 y/o M with hx of CKD, DM, bka, chronic pain on methadone presents for evaluation from NH. Patient was discharged from the [facility] and was taken to [facility] where pt reports they did not give him his methadone. Patient is on 80 mg methadone daily. Patient wants his methadone as prescribed. Deneis any CP, SOB. No abd pain. No n/v.

## 2020-01-08 NOTE — ED PROVIDER NOTE - PROGRESS NOTE DETAILS
I called the charge nurse at [facility]. Spoke w nurse Rodriguez? (charge nurse) I explained that patient has been on methadone for a very long time and needs his medications at NH. I asked the nurse to contact the physician covering NH and make sure his medications are available as prescribed upon hospital discharge (verified in our sunrise system).

## 2020-01-08 NOTE — ED PROVIDER NOTE - NS ED ROS FT
Review of Systems    Constitutional: (-) fever   Eyes/ENT: (-) vision changes  Cardiovascular: (-) chest pain, (-) syncope (-) palpitations  Respiratory: (-) cough, (-) shortness of breath  Gastrointestinal: (-) vomiting, (-) diarrhea (-) abdominal pain  Genitourinary:  (-) dysuria   Musculoskeletal: (-) neck pain, (+) back pain, chronic (-) leg pain/swelling  Integumentary: (-) rash, (-) edema  Neurological: (-) headache

## 2020-01-08 NOTE — ED PROVIDER NOTE - PATIENT PORTAL LINK FT
You can access the FollowMyHealth Patient Portal offered by [facility] by registering at the following website: [email or web address]. By joining [employer]’s FollowMyHealth portal, you will also be able to view your health information using other applications (apps) compatible with our system.

## 2020-01-08 NOTE — ED PROVIDER NOTE - PHYSICAL EXAMINATION
Administered By (Optional): Nicole Glischinski PA-C PHYSICAL EXAM:    GENERAL: Alert, appears stated age, well appearing, non-toxic  SKIN: Warm, pink and dry. MMM.   EYE: Normal lids/conjunctiva  ENT: Normal hearing, patent oropharynx   Pulm: Bilateral BS, normal resp effort, no wheezes, stridor, or retractions  CV: RRR, no M/R/G, 2+and = radial pulses  Neuro: AAOx3.

## 2020-01-08 NOTE — ED PROVIDER NOTE - NSFOLLOWUPINSTRUCTIONS_ED_ALL_ED_FT
Normal Exam    WHAT YOU NEED TO KNOW:    Your healthcare provider did not find a reason for your symptoms today. You may need to follow up with your healthcare provider or a specialist. He will work with you to try to find the cause of your symptoms. He may also run tests to find out more about your overall health.     DISCHARGE INSTRUCTIONS:    Follow up with your healthcare provider or a specialist as directed: Tell your healthcare provider about your symptoms. You may be given a complete physical exam and health checkup. Write down your questions so you remember to ask them during your visits.     Maintain a healthy lifestyle: Healthy foods and regular physical activity can improve your health. They also decrease your risk of heart disease, high blood pressure, and diabetes.     Get 30 minutes of activity every day most days of the week. Ask your healthcare provider which activities are best for you. You can do 30 minutes at once or spread your activity throughout the day to get the recommended amount.       Eat a variety of healthy foods. Healthy foods include whole-grain breads, low-fat dairy products, beans, lean meats, and fish. Eat fruits and vegetables every day, especially those that are green, orange, and red.      Maintain a healthy weight. Ask your healthcare provider how much you should weigh. Ask him to help you create a weight loss plan if you are overweight.       Limit alcohol. Women should limit alcohol to 1 drink a day. Men should limit alcohol to 2 drinks a day. A drink of alcohol is 12 ounces of beer, 5 ounces of wine, or 1½ ounces of liquor.     Do not smoke: If you smoke, it is never too late to quit. You lower your risk for many health problems if you quit. Ask your healthcare provider for information if you need help quitting.     Contact your healthcare provider if:     Your symptoms get worse, or you have new symptoms that bother you.       You have questions or concerns about your condition or care.      Your illness makes it difficult to follow a healthy diet.    Return to the emergency department if:     You have trouble breathing.       You have chest pain.       You feel lightheaded or faint.

## 2020-01-08 NOTE — ED PROVIDER NOTE - OBJECTIVE STATEMENT
58 y/o M with PMH severe PVD with R AKA, L BKA, HTN, paraplegia, suprapubic cath, CKD IV, hypothyroidism, L1 burst fx 2/2 trauma many years ago and is on methadone/oxy for it x many years [facility] and when arrived at SNF, they did not yet have his methadone which he takes 80mg q8 hrs, which is confirmed by EMR. He would like his nighttime dose.   no new pain/falls/complaints.

## 2020-01-08 NOTE — ED ADULT NURSE NOTE - OBJECTIVE STATEMENT
sent in by NH for a dose of his methadone. pt was d.c from hospital and sent to [facility], as per [facility], pt was unable to get dose of methadone.

## 2020-01-08 NOTE — ED PROVIDER NOTE - CLINICAL SUMMARY MEDICAL DECISION MAKING FREE TEXT BOX
I called the charge nurse at [facility]. Spoke w nurse Rodriguez? (charge nurse) I explained that patient has been on methadone for a very long time and needs his medications at NH. I asked the nurse to contact the physician covering NH and make sure his medications are available as prescribed upon hospital discharge (verified in our sunrise system).

## 2020-01-10 NOTE — H&P ADULT - ATTENDING COMMENTS
59 years old male known to have PVD s/p right side AKA and left below knee amputation, paraplegia with multiple chronic bilateral buttock and sacral stage 4 ulcers, suprapubic catheter, HTN (not on any medication), CKD stage IV (not following with nephrology), hypothyroidism, opioid dependance, recent admission for cold left foot (chronic left foot dry gangrene, h/o esophageal candidiasis was sent to the rehab due to worsening kidney functions and hypoglycemia with FS 45.    pt himself denies any chest pain, palpitations, shortness of breath, headache, lightheadedness, vision change, abdominal pain, nausea, vomiting, diarrhea, constipation, foul small urine from suprapubic cath.    Pt only states he had cloudy urine for past few days. states even if needed he will not go for hemodialysis.      Physical Exam:  General: WN/WD NAD but agitated and requesting to go home and to have his Rx  Neurology: A&Ox3, nonfocal, follows commands  Eyes: PERRLA/ EOMI  ENT/Neck: Neck supple, trachea midline, No JVD  Respiratory: CTA B/L, No wheezing, rales, rhonchi  CV: Normal rate regular rhythm, S1S2, no murmurs, rubs or gallops  Abdominal: Soft, NT, ND +BS,   Extremities: No edema, Left BKA , R AKA  Skin: No Rashes, Hematoma, Ecchymosis  Incisions:   Tubes:    A/P  Acute on chronic CKD w/ hyperkalemia / metabolic acidosis   -Nephrology eval     Stage IV sacral decubitus   -Burn eval   -Check wound Cx and blood Cx    Anemia 2/2 chronic disease + FERNANDO  -supplement Fe  -serial H/H  -agree w/ PRBC of Hgb < 7 or hemodynamically unstable     Hypothyroidism  -c/w synthroid supplementation     Opioid dependence / chronic pain   -c/w methadone ( high dose )    Malnutrition / vitamin def   -dietary eval and c/w supplements 59 years old male known to have PVD s/p right side AKA and left below knee amputation, paraplegia with multiple chronic bilateral buttock and sacral stage 4 ulcers, suprapubic catheter, HTN (not on any medication), CKD stage IV (not following with nephrology), hypothyroidism, opioid dependance, recent admission for cold left foot (chronic left foot dry gangrene, h/o esophageal candidiasis was sent to the rehab due to worsening kidney functions and hypoglycemia with FS 45.    pt himself denies any chest pain, palpitations, shortness of breath, headache, lightheadedness, vision change, abdominal pain, nausea, vomiting, diarrhea, constipation, foul small urine from suprapubic cath.    Pt only states he had cloudy urine for past few days. states even if needed he will not go for hemodialysis.      Physical Exam:  General: WN/WD NAD but agitated and requesting to go home and to have his Rx  Neurology: A&Ox3, nonfocal, follows commands  Eyes: PERRLA/ EOMI  ENT/Neck: Neck supple, trachea midline, No JVD  Respiratory: CTA B/L, No wheezing, rales, rhonchi  CV: Normal rate regular rhythm, S1S2, no murmurs, rubs or gallops  Abdominal: Soft, NT, ND +BS,   Extremities: No edema, Left BKA , R AKA  Skin: No Rashes, Hematoma, Ecchymosis  Incisions:   Tubes:    A/P  Acute on chronic CKD w/ hyperkalemia / metabolic acidosis   -Nephrology eval     Aggressive behavior and threats to family members ( as documented by Psychiatry  -Psychiatry follow  up before any D/C planning  - eval      Stage IV sacral decubitus   -Burn eval   -Check wound Cx and blood Cx    Anemia 2/2 chronic disease + FERNANDO  -supplement Fe  -serial H/H  -agree w/ PRBC of Hgb < 7 or hemodynamically unstable     Hypothyroidism  -c/w synthroid supplementation     Opioid dependence / chronic pain   -c/w methadone ( high dose )    Malnutrition / vitamin def   -dietary eval and c/w supplements

## 2020-01-10 NOTE — ED ADULT NURSE REASSESSMENT NOTE - NS ED NURSE REASSESS COMMENT FT1
Pt states "I have 3 stage 5 ulcers on my back. You can put your fist in it. You're not touching it, no one is touching it, Dr. Gooden has pictures. They just changed it before I got here." Educated patient on importance of assessing wounds daily and need for dressing changes. Pt verbalized understanding and still refused. MD made aware. Comfort and safety maintained.

## 2020-01-10 NOTE — ED BEHAVIORAL HEALTH ASSESSMENT NOTE - HPI (INCLUDE ILLNESS QUALITY, SEVERITY, DURATION, TIMING, CONTEXT, MODIFYING FACTORS, ASSOCIATED SIGNS AND SYMPTOMS)
58 yo  disabled domiciled  male with complex medical conditions, including PVD s/p right AKA months ago, with multiple chronic bedsores, and suprapubic catheter, HTN (not on any medication), CKD stage IV (not following with nephrology), hypothyroidism, opioid dependance, sent from [facility] for evaluation of elevated K+, onset today, associated with worsening kidney function.     Pt refuses to return to Nursing home, insisting to go back to his house. He was reported by ED care provider that he was emotionally dysregulated and verbally aggressive toward to his mother who has warned that pt has elevated risk of overdosing on his pain medications and concerned his worsening medical conditions without attentive care. Pt displays tendency of minimizing or neglecting his medical needs. Medical consultation requested to provide assessment of pt's ability to self-care.    Pt was laying in the stretcher, appears pale and tired. He was irritable, but relating well, with good eye contact. He adamantly advocate for self-sufficiency and independence. He said he had been in the nursing home for a few days after discharge on last Monday where the service did not meet his needs. He wants to go home and can take care of himself. His friends just live "around the block" and can come and help out if needed. He said he newly renovated his bathroom again after leg amputations. He said his house is fully handicapped-proof. He can cook and clean himself. He loves what his doing, road trips and performances in [facility]-states with his friends. He is a  and has helped making tools for people/friends to make their lives more convenient and better. He said Dr. Gooden, "the wound doctor, who was amazed by my invented-would-care-"bandage". He did not appreciate his mother "controlling his life". He understands the risks the people repeatedly warn him such as overdose, fall, etc., which he does not think would happen to him. He said he had over 40 surgeries, and has also been helping himself. He denies homocidal or suicidal ideation. He explained that the phrase "to strangle my mother" was just an expression of frustration.    The pt said he was prescribed by his critical care physician Dr. Chemo Joy the pain meds, including Methodone 10mg po qid, and 10mg po qid as PRN, which in total is 80mg per day. He also takes oxycodone 60mg po qid PRN for breakthrough     The pt was reported verbally threatening his mother who concerns his overdose on pain medications, 60 yo  disabled domiciled  male with complex medical conditions, including PVD s/p right AKA months ago, with multiple chronic bedsores, and suprapubic catheter, HTN (not on any medication), CKD stage IV (not following with nephrology), hypothyroidism, opioid dependance, sent from [facility] for evaluation of elevated K+, onset today, associated with worsening kidney function.     Pt refuses to return to Nursing home, insisting to go back to his house. He was reported by ED care provider that he was emotionally dysregulated and verbally aggressive toward to his mother who has warned that pt has elevated risk of overdosing on his pain medications and concerned his worsening medical conditions without attentive care. Pt displays tendency of minimizing or neglecting his medical needs. Medical consultation requested to provide assessment of pt's ability to self-care.    Pt was laying in the stretcher, appears pale and tired. He was irritable, but relating well, with good eye contact. He adamantly advocate for self-sufficiency and independence. He said he had been in the nursing home for a few days after discharge on last Monday where the service did not meet his needs. He wants to go home and can take care of himself. His friends just live "around the block" and can come and help out if needed. He said he newly renovated his bathroom again after leg amputations. He said his house is fully handicapped-proof. He can cook and clean himself. He loves what his doing, road trips and performances in [facility]-states with his friends. He is a  and has helped making tools for people/friends to make their lives more convenient and better. He said Dr. Gooden, "the wound doctor, who was amazed by my invented-would-care-"bandage". He did not appreciate his mother "controlling his life". He understands the risks the people repeatedly warn him such as overdose, fall, etc., which he does not think would happen to him. He said he had over 40 surgeries, and has also been helping himself. Pt said he has visiting nurse who visits 2x per week to help with medical care.    He denies homocidal or suicidal ideation. The pt was reported verbally threatening his mother who concerns his overdose on pain medications, or his inability to care to himself. He explained that the phrase "to strangle my mother" was just an expression of frustration, "come on... are you stupid?".  Pt displays good self-control during the interview. He also displays insight on his pain medications vs impairment in his ability in driving.     The pt said he was prescribed by his critical care physician Dr. Chemo Joy the pain meds, including Methodone 10mg po qid, and 10mg po qid as PRN, which in total is 80mg per day. He also takes oxycodone 60mg po qid PRN for breakthrough pain. He explains that he wants to live a life that he can enjoy. He races car and doing road shows "[employer]". He states that his mother's over controlling his life and care makes him feel angry.

## 2020-01-10 NOTE — H&P ADULT - NSHPSOCIALHISTORY_GEN_ALL_CORE
from [facility].  quit smoking cigarettes 3 months ago, smoked for 15 years, a pack / day.  denies consuming Etoh.  smokes marijuana, states last smoked was months ago

## 2020-01-10 NOTE — ED ADULT NURSE NOTE - OBJECTIVE STATEMENT
Patient discharge to [facility] yesterday from [facility] s/p RAFI.  Pt sent to ED for abnormal labs showing SILVINA

## 2020-01-10 NOTE — ED ADULT TRIAGE NOTE - CHIEF COMPLAINT QUOTE
pt sent in from [facility] for ARF. potassium greater than 6 creat over 4.5

## 2020-01-10 NOTE — H&P ADULT - HISTORY OF PRESENT ILLNESS
59 years old male known to have PVD s/p R AKA, paraplegia with multiple chronic bedsores and suprapubic catheter, HTN (not on any medication), CKD stage IV (not following with nephrology), hypothyroidism, opioid dependance, recent admission for cold left foot (chronic left foot dry gangrene, h/o esophageal candidiasis presented to the ED for 59 years old male known to have PVD s/p bilateral AKA, paraplegia with multiple chronic bedsores and suprapubic catheter, HTN (not on any medication), CKD stage IV (not following with nephrology), hypothyroidism, opioid dependance, recent admission for cold left foot (chronic left foot dry gangrene, h/o esophageal candidiasis was sent to the rehab due to worsening kidney functions and hypoglycemia with FS 45.    pt himself denies any chest pain, palpitations, shortness of breath, headache, lightheadedness, vision change, abdominal pain, nausea, vomiting, diarrhea, constipation, foul small urine from suprapubic cath.    Pt only states he had cloudy urine for past few days. states even if needed he will not go for hemodialysis. 59 years old male known to have PVD s/p right side AKA and left below knee amputation, paraplegia with multiple chronic bedsores and suprapubic catheter, HTN (not on any medication), CKD stage IV (not following with nephrology), hypothyroidism, opioid dependance, recent admission for cold left foot (chronic left foot dry gangrene, h/o esophageal candidiasis was sent to the rehab due to worsening kidney functions and hypoglycemia with FS 45.    pt himself denies any chest pain, palpitations, shortness of breath, headache, lightheadedness, vision change, abdominal pain, nausea, vomiting, diarrhea, constipation, foul small urine from suprapubic cath.    Pt only states he had cloudy urine for past few days. states even if needed he will not go for hemodialysis. 59 years old male known to have PVD s/p right side AKA and left below knee amputation, paraplegia with multiple chronic bilateral buttock and sacral stage 4 ulcers, suprapubic catheter, HTN (not on any medication), CKD stage IV (not following with nephrology), hypothyroidism, opioid dependance, recent admission for cold left foot (chronic left foot dry gangrene, h/o esophageal candidiasis was sent to the rehab due to worsening kidney functions and hypoglycemia with FS 45.    pt himself denies any chest pain, palpitations, shortness of breath, headache, lightheadedness, vision change, abdominal pain, nausea, vomiting, diarrhea, constipation, foul small urine from suprapubic cath.    Pt only states he had cloudy urine for past few days. states even if needed he will not go for hemodialysis.

## 2020-01-10 NOTE — ED BEHAVIORAL HEALTH ASSESSMENT NOTE - SUICIDE PROTECTIVE FACTORS
Positive therapeutic relationships/Ability to cope with stress/Supportive social network of family or friends/Identifies reasons for living/Has future plans/Engaged in work or school/Responsibility to family and others

## 2020-01-10 NOTE — ED PROVIDER NOTE - OBJECTIVE STATEMENT
60 yo M with hx of PVD s/p Right AKA months ago, paraplegia with multiple chronic bedsores and suprapubic catheter, HTN (not on any medication), CKD stage IV (not following with nephrology), hypothyroidism, opioid dependance, sent from [facility] for evaluation of elevated K+, onset today, associated with worsening kidney function. No fever, no chills, no headache, no nausea, no vomiting, no chest pain, no back pain, no abdominal pain. Pt states that he has been making urine with his suprapubic catheter. Pt denies any other symptoms.

## 2020-01-10 NOTE — ED BEHAVIORAL HEALTH ASSESSMENT NOTE - SUMMARY
60 yo  domiciled  male with history of chronic pain medication dependence and complex medical conditions and AKA who was admitted for hyperkelimia and worsening kidney failure. Pt was unhappy with medical care in the nursing home and insists to return home. He was evaluated for the ability to make decision for his care. Pt displays good judegement and insight and self-control. He emphasizes his quality of life as well as his insights of weighing risk factors versus benefits when consideration of treatment choices. Pt denies depressed mood or anxiety, but angry about people making decision for him. He denies SI or HI.

## 2020-01-10 NOTE — ED PROVIDER NOTE - CARDIAC, MLM
Is This A New Presentation, Or A Follow-Up?: Skin Lesions How Severe Is Your Skin Lesion?: moderate Have Your Skin Lesions Been Treated?: not been treated Normal rate, regular rhythm.  Heart sounds S1, S2.  No murmurs, rubs or gallops.

## 2020-01-10 NOTE — H&P ADULT - NSICDXPASTSURGICALHX_GEN_ALL_CORE_FT
PAST SURGICAL HISTORY:  H/O hernia repair     S/P below knee amputation, left     S/P below knee amputation, right     S/P debridement     Toe amputation status, right

## 2020-01-10 NOTE — ED PROVIDER NOTE - ST/T WAVE
Hemostasis: None Wound Care: Petrolatum Notification Instructions: Patient will be notified of biopsy results. However, patient instructed to call the office if not contacted within 2 weeks. ST flattening aVL, I X Size Of Lesion In Cm (Optional): 0 Patient Will Remove Sutures At Home?: No Home Suture Removal Text: Patient was provided a home suture removal kit and will remove their sutures at home.  If they have any questions or difficulties they will call the office. Dressing: bandage Punch Size In Mm: 3 Billing Type: Third-Party Bill Epidermal Sutures: 5-0 Nylon Consent: Written consent was obtained and risks were reviewed including but not limited to scarring, infection, bleeding, scabbing, incomplete removal, nerve damage and allergy to anesthesia. Anesthesia Type: 1% lidocaine with epinephrine Was A Bandage Applied: Yes Anesthesia Volume In Cc (Will Not Render If 0): 0.5 Suture Removal: 14 days Post-Care Instructions: I reviewed with the patient in detail post-care instructions. Patient is to keep the biopsy site dry overnight, and then apply bacitracin twice daily until healed. Patient may apply hydrogen peroxide soaks to remove any crusting. Detail Level: Detailed Path Notes (To The Dermatopathologist): Presents only on anterior shin of right leg Biopsy Type: H and E

## 2020-01-10 NOTE — ED BEHAVIORAL HEALTH ASSESSMENT NOTE - DOMICILE TYPE
Patient:   BETSEY LEIVA            MRN: [id number]            FIN: [id number]              Age:   63 years     Sex:  MALE     :  59   Associated Diagnoses:   None   Author:   LISA ZAMAN     Cardiology Consultation (Nurys) Dictated  #[id number]    IMPRESSION:  S/P laparoscopic angelica en Y gastric bypass with a 150 cm Angelica-en-Y limb in a antigastric retrocolic position and a 25 mm circular Seamguard reinforced stapled gastrojejunostomy;  Laparoscopic Large paraesophageal/hiatal hernia repair with anterior and posterior curopexy and anterior and posterior BioA mesh placement fixed with suturing  19  Morbid Obesity  Permanent Atrial fibrillation  Nonobstructive CAD-angio 2009  Chronic rivaroxaban anticoagulation-held  MELL-uses CPAP  HTN  GERD  hx anxiety  BPH      RECOMMENDATIONS:  Routine postop care  Aggressive IS  Continue HR controlling metoprolol  Enoxaparin for DVT prophylaxis x 7 days and then resume rivaroxaban 20 mg/day per Dr. Ayan Nuno MD  [facility]   Private Residence

## 2020-01-10 NOTE — H&P ADULT - NS ABD PE RECTAL EXAM
Pernell see below, patient was seen on 6/17 by . 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 He has been feeling weak and not eating, he was started on sertraline and feels awful, he took it for two days and stopped taking it because he felt sick and had a tremendous headache, chills and sweats, he continued to take the valium, he felt worse yesterday and just stopped taking everything on Wednesday (valium and sertraline and omeprazole), he woke up Thursday and felt awful and took a valium, he still felt okay for about an hour and then back to feeling sick. Patient states at this point he has not taken anything because he is too scared too given his current symptoms (stomach problems, weakness, muscle aches etc) he is asking what he should do now, start a different mend, do you want to see him again? Please advise thank you!     not examined

## 2020-01-10 NOTE — H&P ADULT - ASSESSMENT
59 years old male known to have PVD s/p right side AKA and left below knee amputation, paraplegia with multiple chronic bilateral buttock and sacral stage 4 ulcers, suprapubic catheter, HTN (not on any medication), CKD stage IV (not following with nephrology), hypothyroidism, opioid dependance, recent admission for cold left foot (chronic left foot dry gangrene, h/o esophageal candidiasis was sent to the rehab due to worsening kidney functions and hypoglycemia with FS 45.    In the ED, pt remained hemodynamically stable but looks very pale, no active bleeding, lab workup showed leukocytosis of 19, normo anemia with Hb 7.7 (vbg 8.7), potassium 6.5 (hemolyzed) repeat 5.2, anion gap metabolic acidosis, worsening CKD 4 with creat 4.1 (baseline 3.5), , cxr ok.    Assessment and Plan:    # Worsening of CKD 4  # Hyperkalemia 6.5 > 5.2  # Metabolic acidosis with PH 7.20 on vbg    creat 4.1 (baseline 3.5)  Pt non oliguric  does not follow up with any nephrologist  doubt pt received his Patiromer in past few days  Ca correction for low albumin is 9.8    Plan:  fu repeat bmp, will order Kayexalate if repeat bmp shows elevated K  nephro eval  cw renvela / Ca acetate  started NaHCO3  fu beta hydroxybutyrate / Phosphorus / pth      # Leukocytosis  # paraplegia with multiple chronic bilateral buttock and sacral stage 4 ulcers  CXR ok  likely secondary to stage 4 bilateral buttock and sacral ulcer  cw vanco and cefepime for now, change as needed acc to blood and wound Cx  MRSA  burn eval  fu blood Cx      # Acute normo anemia  Pt very pale on examination  no active bleed  keep active type and screen  transfuse to keep Hb > 7  Iron studies from Nov 2019 shows anemia of chronic disease + iron def  fu nephro eval if venofer needed      #  PVD s/p right side AKA and left below knee amputation  incision sites look clean    # hypothyroidism  cw synthroid  fu tsh    # h/o esophageal candidiasis  cw fluconazole  outpt GI fu    # opioid dependance  cw methadone  outpt clinic fu on discharge    # HTN (not on any medication)      # suspected folate / vit D / Vit b12 DEF  Fu level and start if needed    # Pt looks malnourished  fu nutrition eval      DVT PPX: heparin  GI PPx: not indicated  Diet: dash  Activity: bed rest

## 2020-01-10 NOTE — ED ADULT NURSE NOTE - NSIMPLEMENTINTERV_GEN_ALL_ED
Implemented All Fall Risk Interventions:  [place] to call system. Call bell, personal items and telephone within reach. Instruct patient to call for assistance. Room bathroom lighting operational. Non-slip footwear when patient is off stretcher. Physically safe environment: no spills, clutter or unnecessary equipment. Stretcher in lowest position, wheels locked, appropriate side rails in place. Provide visual cue, wrist band, yellow gown, etc. Monitor gait and stability. Monitor for mental status changes and reorient to person, place, and time. Review medications for side effects contributing to fall risk. Reinforce activity limits and safety measures with patient and family.

## 2020-01-10 NOTE — ED PROVIDER NOTE - PATIENT PORTAL LINK FT
You can access the FollowMyHealth Patient Portal offered by [facility] by registering at the following website: [email or web address]. By joining [email or web address]’s FollowMyHealth portal, you will also be able to view your health information using other applications (apps) compatible with our system.

## 2020-01-10 NOTE — ED BEHAVIORAL HEALTH ASSESSMENT NOTE - RISK ASSESSMENT
Pt has been on high dose of pain medications, having chronic elevated risk of overdose of opioid pain medications given his complex medical condition. Low Acute Suicide Risk

## 2020-01-10 NOTE — ED ADULT NURSE REASSESSMENT NOTE - NS ED NURSE REASSESS COMMENT FT1
Pt extremely agitated, stating he wants to go home. Pt refusing all interventions. Pt requesting Methadone 80mg, refusing all other pain medication orders. Pt agitated by family, family removed, stimuli decreased. Awaiting psychiatry consult. Safety maintained.

## 2020-01-10 NOTE — ED PROVIDER NOTE - ATTENDING CONTRIBUTION TO CARE
I personally evaluated patient. I agree with the findings and plan with all documentation on chart except as documented  in my note.    58 y/o M PMHx PVD s/p Right AKA months ago, paraplegia with multiple chronic bedsores and suprapubic catheter, HTN (not on any medication), CKD stage IV (not following with nephrology), hypothyroidism, opioid dependance, sent from [facility] for evaluation of elevated K+ (6.0) and Creatinine 4.5. No fever. Pt states that he has been making urine with his suprapubic catheter. Pt denies any other symptoms other than chronic pain.    On exam, VS reviewed. Immediate EKG done and shows no signs of hyperkalemia.  Patient is making urine.  He has no signs of fluid overload.  Immediate IV placed and labs sent. K+ on VBG 5.2. Will follow up labs, discuss case with PMD and renal, and follow up work up and reassess. I personally evaluated patient. I agree with the findings and plan with all documentation on chart except as documented  in my note.    60 y/o M PMHx PVD s/p Right AKA months ago, paraplegia with multiple chronic bedsores and suprapubic catheter, HTN (not on any medication), CKD stage IV (not following with nephrology), hypothyroidism, opioid dependance, sent from [facility] for evaluation of elevated K+ (6.0) and Creatinine 4.5. No fever. Pt states that he has been making urine with his suprapubic catheter. Pt denies any other symptoms other than chronic pain.    On exam, VS reviewed. Immediate EKG done and shows no signs of hyperkalemia.  Patient is making urine.  He has no signs of fluid overload.  Immediate IV placed and labs sent. K+ on VBG 5.2. Will follow up labs, discuss case with PMD and renal, and follow up work up and reassess.    Patient later in ED course was upset and wants to be home.  Will have psychiatry called as patient has no feet and lives alone.  This does not sound like a safe option but patient needs evaluation of decisional capacity. Patient requests his medications and his wheelchair and wishes to go home.

## 2020-01-10 NOTE — ED PROVIDER NOTE - NSFOLLOWUPINSTRUCTIONS_ED_ALL_ED_FT
Chronic Kidney Disease, Adult  Chronic kidney disease (CKD) happens when the kidneys are damaged over a long period of time. The kidneys are two organs that help with:  Getting rid of waste and extra fluid from the blood.Making hormones that maintain the amount of fluid in your tissues and blood vessels.Making sure that the body has the right amount of fluids and chemicals.Most of the time, CKD does not go away, but it can usually be controlled. Steps must be taken to slow down the kidney damage or to stop it from getting worse. If this is not done, the kidneys may stop working.  Follow these instructions at home:  Medicines     Take over-the-counter and prescription medicines only as told by your doctor. You may need to change the amount of medicines you take.Do not take any new medicines unless your doctor says it is okay. Many medicines can make your kidney damage worse.Do not take any vitamin and supplements unless your doctor says it is okay. Many vitamins and supplements can make your kidney damage worse.General instructions     Follow a diet as told by your doctor. You may need to stay away from:  Alcohol.Salty foods.Foods that are high in:  Potassium.Calcium.Protein.Do not use any products that contain nicotine or tobacco, such as cigarettes and e-cigarettes. If you need help quitting, ask your doctor.Keep track of your blood pressure at home. Tell your doctor about any changes.If you have diabetes, keep track of your blood sugar as told by your doctor.Try to stay at a healthy weight. If you need help, ask your doctor.Exercise at least 30 minutes a day, 5 days a week.Stay up-to-date with your shots (immunizations) as told by your doctor.Keep all follow-up visits as told by your doctor. This is important.Contact a doctor if:  Your symptoms get worse.You have new symptoms.Get help right away if:  You have symptoms of end-stage kidney disease. These may include:  Headaches.Numbness in your hands or feet.Easy bruising.Having hiccups often.Chest pain.Shortness of breath.Stopping of menstrual periods in women.You have a fever.You have very little pee (urine).You have pain or bleeding when you pee.Summary  Chronic kidney disease (CKD) happens when the kidneys are damaged over a long period of time.Most of the time, this condition does not go away, but it can usually be controlled. Steps must be taken to slow down the kidney damage or to stop it from getting worse.Treatment may include a combination of medicines and lifestyle changes.This information is not intended to replace advice given to you by your health care provider. Make sure you discuss any questions you have with your health care provider.      Hyperkalemia  Hyperkalemia is when you have too much potassium in your blood. Potassium helps your body in many ways, but having too much can cause problems. If there is too much potassium in your blood, it can affect how your heart works.  Potassium is normally removed from your body by your kidneys. Many things can cause the amount in your blood to be high. Medicines and other treatments can be used to bring the amount to a normal level. Treatment may need to be done in the hospital.  Follow these instructions at home:     Take over-the-counter and prescription medicines only as told by your doctor.Do not take any of the following unless your doctor says it is okay:  Supplements.Natural products.Herbs.Vitamins.Limit how much alcohol you drink as told by your doctor.Do not use drugs. If you need help quitting, ask your doctor.If you have kidney disease, you may need to follow a low-potassium diet. A  (dietitian) can help you.Keep all follow-up visits as told by your doctor. This is important.Contact a doctor if:  Your heartbeat is not regular or is very slow.You feel dizzy (light-headed).You feel weak.You feel sick to your stomach (nauseous).You have tingling in your hands or feet.You lose feeling (have numbness) in your hands or feet.Get help right away if:  You are short of breath.You have chest pain.You pass out (faint).You cannot move your muscles.Summary  Hyperkalemia is when you have too much potassium in your blood.Take over-the-counter and prescription medicines only as told by your doctor.Limit how much alcohol you drink as told by your doctor.Contact a doctor if your heartbeat is not regular.This information is not intended to replace advice given to you by your health care provider. Make sure you discuss any questions you have with your health care provider.

## 2020-01-10 NOTE — ED PROVIDER NOTE - PROGRESS NOTE DETAILS
Discussed with nursing home patient refusing treatment. Discussed with Romayne RN will send back to [facility]. Authored by Dr. Carrion. Pt signed out to Dr. Pitts for continued care Psych spoken to, will admit.

## 2020-01-10 NOTE — ED PROVIDER NOTE - CLINICAL SUMMARY MEDICAL DECISION MAKING FREE TEXT BOX
EMS
59m presents to the ed for elevated cr and k- 6. Initial team repeated labs vbg- 5.2. The plan was to send the pt back to the NH, however pt refused and stated he wanted to go home. Pt is a paraplegic and has no access to care at home, and it appeared the pt's line of reasoning was not sound. Psych consulted- and the pt was deemed to not have capacity. Therefore we admitted the pt for mgmt of his elevated ct and placement.

## 2020-01-10 NOTE — H&P ADULT - CONSTITUTIONAL COMMENTS
Pt looks very pale, has bilateral (right AKA and left BKA, both sites look clean), suprapubic catheter (site looks clean)

## 2020-01-11 NOTE — CHART NOTE - NSCHARTNOTEFT_GEN_A_CORE
Patient wants to leave now home. He came from [facility] and he is adamantly refusing medical care ( no dialysis, no blood work). He is saying that we cannot hold him against his will, he is safe and sound, AAOX4. he will call the police otherwise. No social work/case management was involved yet ( he was admitted overnight). He wants to go back home now and he does not have legs, he said he will call a van to leave. He is shouting at the staff. He was assessed by ED psych earlier and they mentioned that he denies depressed mood or anxiety, but is angry about people making decision for him. He has low suicidal risk. He is agreeable to leave AMA

## 2020-01-11 NOTE — DISCHARGE NOTE PROVIDER - PROVIDER TOKENS
FREE:[LAST:[rosie],FIRST:[bella],PHONE:[(   )    -],FAX:[(   )    -]] FREE:[LAST:[velez],FIRST:[bella],PHONE:[(   )    -],FAX:[(   )    -],FOLLOWUP:[1 week]]

## 2020-01-11 NOTE — PHARMACOTHERAPY INTERVENTION NOTE - COMMENTS
I spoke with Dr perez (spectra [id number]) and recommended to decrease dose fro 1000 mg to 750 mg because patient's GFR is 15. Will consider

## 2020-01-11 NOTE — CONSULT NOTE ADULT - SUBJECTIVE AND OBJECTIVE BOX
PAST HISTORY  --------------------------------------------------------------------------------  PAST MEDICAL & SURGICAL HISTORY:  Anemia  PAD (peripheral artery disease)  Hypertension  Suprapubic catheter  Pressure ulcer  Diabetes  Lumbar compression fracture  S/P below knee amputation, left  S/P below knee amputation, right  S/P debridement  Toe amputation status, right  H/O hernia repair    FAMILY HISTORY:  FH: atrial fibrillation: father    PAST SOCIAL HISTORY:    ALLERGIES & MEDICATIONS  --------------------------------------------------------------------------------  Allergies    sulfamethizole (Unknown)        Standing Inpatient Medications  amitriptyline 25 milliGRAM(s) Oral at bedtime  calcium acetate 667 milliGRAM(s) Oral three times a day with meals  cefepime   IVPB 500 milliGRAM(s) IV Intermittent every 24 hours  cefepime   IVPB      chlorhexidine 4% Liquid 1 Application(s) Topical <User Schedule>  cholecalciferol 2000 Unit(s) Oral daily  fluconAZOLE   Tablet 50 milliGRAM(s) Oral <User Schedule>  heparin  Injectable 5000 Unit(s) SubCutaneous every 12 hours  hydrocortisone 20 milliGRAM(s) Oral daily  influenza   Vaccine 0.5 milliLiter(s) IntraMuscular once  levothyroxine 100 MICROGram(s) Oral daily  methadone    Tablet 10 milliGRAM(s) Oral every 8 hours  oxybutynin 10 milliGRAM(s) Oral two times a day  oxyCODONE  ER Tablet 10 milliGRAM(s) Oral every 12 hours  sevelamer carbonate 800 milliGRAM(s) Oral three times a day with meals  sodium bicarbonate 650 milliGRAM(s) Oral three times a day  vancomycin  IVPB 750 milliGRAM(s) IV Intermittent every 24 hours    PRN Inpatient Medications  diazepam    Tablet 5 milliGRAM(s) Oral two times a day PRN  oxyCODONE    IR 30 milliGRAM(s) Oral every 8 hours PRN      REVIEW OF SYSTEMS  --------------------------------------------------------------------------------  Respiratory: No dyspnea, cough  CV: No chest pain  GI: No abdominal pain, diarrhea, constipation  :  SPC   MSK:  b/l amputations       VITALS/PHYSICAL EXAM  --------------------------------------------------------------------------------  T(C): 36.3 (01-11-20 @ 07:40), Max: 36.3 (01-11-20 @ 07:40)  HR: 100 (01-11-20 @ 07:40) (88 - 100)  BP: 107/60 (01-11-20 @ 07:40) (107/60 - 114/57)  RR: 18 (01-11-20 @ 07:40) (18 - 18)  SpO2: 97% (01-11-20 @ 07:40) (97% - 97%)  Wt(kg): --    Weight (kg): 60.1 (01-11-20 @ 00:00)      Physical Exam:  	Gen: NAD  	Pulm: decrease BS  B/L  	Abd: +distended  	:  SPC   	LE: b/l amputations      LABS/STUDIES  --------------------------------------------------------------------------------              7.7    18.34 >-----------<  291      [01-10-20 @ 12:01]              24.0     136  |  104  |  66  ----------------------------<  107      [01-10-20 @ 12:01]  6.5   |  14  |  4.1        Ca     7.8     [01-10-20 @ 12:01]    TPro  4.9  /  Alb  1.5  /  TBili  <0.2  /  DBili  x   /  AST  24  /  ALT  15  /  AlkPhos  172  [01-10-20 @ 12:01]          Creatinine Trend:  SCr 4.1 [01-10 @ 12:01]  SCr 3.7 [01-06 @ 11:50]  SCr 4.1 [01-06 @ 00:00]  SCr 3.8 [01-05 @ 06:40]  SCr 3.7 [01-04 @ 07:16]    Urinalysis - [01-10-20 @ 23:40]      Color Yellow / Appearance Slightly Turbid / SG 1.013 / pH 6.5      Gluc Negative / Ketone Trace  / Bili Negative / Urobili <2 mg/dL       Blood Small / Protein 300 mg/dL / Leuk Est Small / Nitrite Negative      RBC 15 / WBC 62 / Hyaline 5 / Gran  / Sq Epi  / Non Sq Epi 0 / Bacteria Many      Iron 27, TIBC 89, %sat 30      [11-27-19 @ 06:17]  Ferritin 679      [11-27-19 @ 06:17]  PTH -- (Ca 7.8)      [11-28-19 @ 06:29]   99  PTH -- (Ca 7.0)      [10-14-19 @ 19:45]   209  PTH -- (Ca 7.8)      [07-02-19 @ 10:54]   71  Vitamin D (25OH) <5      [10-14-19 @ 19:45]  HbA1c 5.5      [06-27-19 @ 06:35]  TSH 62.50      [12-26-19 @ 07:30]    HBsAg Nonreact      [07-06-19 @ 07:13]  HCV 0.13, Nonreact      [07-06-19 @ 07:13]    JOSE D: titer Negative, pattern --      [07-06-19 @ 07:13]  C3 Complement 104      [07-06-19 @ 07:13]  C4 Complement 24      [07-06-19 @ 07:13]  ANCA: cANCA Negative, pANCA Negative, atypical ANCA Negative      [07-06-19 @ 07:13]  Immunofixation Serum:   Polyclonal Expansion with a Weak IgG Kappa Band Identified    Reference Range: None Detected      [07-06-19 @ 07:13]  Immunofixation Urine: Reference Range: None Detected      [07-05-19 @ 23:30]

## 2020-01-11 NOTE — DISCHARGE NOTE PROVIDER - CARE PROVIDER_API CALL
bella velez  Phone: (   )    -  Fax: (   )    -  Follow Up Time: bella velez  Phone: (   )    -  Fax: (   )    -  Follow Up Time: 1 week

## 2020-01-11 NOTE — DISCHARGE NOTE PROVIDER - NSDCMRMEDTOKEN_GEN_ALL_CORE_FT
amitriptyline 25 mg oral tablet: 1 tab(s) orally once a day (at bedtime)  calcium acetate 667 mg oral tablet: 1 tab(s) orally 3 times a day (with meals)  cholecalciferol oral tablet: 2000 unit(s) orally once a day  diazePAM 5 mg oral tablet: 1 tab(s) orally 2 times a day, As needed, anxiety  fluconazole 50 mg oral tablet: 1 tab(s) orally 2 times a day  hydrocortisone 20 mg oral tablet: 1 tab(s) orally once a day  levothyroxine 100 mcg (0.1 mg) oral tablet: 1 tab(s) orally once a day  methadone 10 mg oral tablet: 8 tab(s) orally every 8 hours  oxybutynin 10 mg/24 hr oral tablet, extended release: 1 tab(s) orally 2 times a day  oxyCODONE 30 mg oral tablet: 1 tab(s) orally every 8 hours, As needed, Severe Pain (7 - 10)  patiromer 8.4 g oral powder for reconstitution: 8.4 gram(s) orally once a day (at bedtime)   sevelamer carbonate 800 mg oral tablet: 1 tab(s) orally 3 times a day (with meals) amitriptyline 25 mg oral tablet: 1 tab(s) orally once a day (at bedtime)  calcium acetate 667 mg oral tablet: 1334 milligram(s) orally 3 times a day  cholecalciferol oral tablet: 2000 unit(s) orally once a day  collagenase 250 units/g topical ointment: 1 application topically 2 times a day  darbepoetin kecia 25 mcg/mL injectable solution: 20 microgram(s) injectable once a week  diazePAM 5 mg oral tablet: 1 tab(s) orally 2 times a day, As needed, anxiety  doxycycline monohydrate 100 mg oral capsule: 1 cap(s) orally every 12 hours  dronabinol 2.5 mg oral capsule: 1 cap(s) orally every 12 hours  levothyroxine 100 mcg (0.1 mg) oral tablet: 1 tab(s) orally once a day  midodrine 2.5 mg oral tablet: 1 tab(s) orally 3 times a day  oxybutynin 10 mg/24 hr oral tablet, extended release: 1 tab(s) orally 2 times a day  oxyCODONE 30 mg oral tablet: 1 tab(s) orally every 4 hours  patiromer 8.4 g oral powder for reconstitution: 8.4 gram(s) orally once a day (at bedtime)   sevelamer carbonate 800 mg oral tablet: 1 tab(s) orally 3 times a day (with meals)  sodium hypochlorite 0.25% topical solution: 1 application topically 2 times a day  triamcinolone 0.1% topical ointment: 1 application topically every 12 hours amitriptyline 25 mg oral tablet: 1 tab(s) orally once a day (at bedtime)  collagenase 250 units/g topical ointment: 1 application topically 2 times a day  diazePAM 5 mg oral tablet: 1 tab(s) orally 2 times a day, As needed, anxiety  doxycycline monohydrate 100 mg oral capsule: 1 cap(s) orally every 12 hours  dronabinol 2.5 mg oral capsule: 1 cap(s) orally every 12 hours  levothyroxine 100 mcg (0.1 mg) oral tablet: 1 tab(s) orally once a day  nystatin 100,000 units/g topical ointment: 1 application topically 2 times a day  oxybutynin 10 mg/24 hr oral tablet, extended release: 1 tab(s) orally 2 times a day  oxyCODONE 10 mg oral tablet, extended release: 2 tab(s) orally every 12 hours  oxyCODONE 30 mg oral tablet: 1 tab(s) orally every 4 hours  polyethylene glycol 3350 oral powder for reconstitution: 17 gram(s) orally once a day (at bedtime)  sodium hypochlorite 0.25% topical solution: 1 application topically 2 times a day  triamcinolone 0.1% topical ointment: 1 application topically every 12 hours amitriptyline 25 mg oral tablet: 1 tab(s) orally once a day (at bedtime)  collagenase 250 units/g topical ointment: 1 application topically 2 times a day  diazePAM 5 mg oral tablet: 1 tab(s) orally 2 times a day, As needed, anxiety  doxycycline monohydrate 100 mg oral capsule: 1 cap(s) orally every 12 hours  dronabinol 2.5 mg oral capsule: 1 cap(s) orally every 12 hours  levothyroxine 100 mcg (0.1 mg) oral tablet: 1 tab(s) orally once a day  nystatin 100,000 units/g topical ointment: 1 application topically 2 times a day  oxybutynin 10 mg/24 hr oral tablet, extended release: 1 tab(s) orally 2 times a day  oxyCODONE 20 mg oral tablet, extended release: 1 tab(s) orally every 12 hours  oxyCODONE 30 mg oral tablet: 1 tab(s) orally every 4 hours  polyethylene glycol 3350 oral powder for reconstitution: 17 gram(s) orally once a day (at bedtime)  sodium hypochlorite 0.25% topical solution: 1 application topically 2 times a day  triamcinolone 0.1% topical ointment: 1 application topically every 12 hours

## 2020-01-11 NOTE — DISCHARGE NOTE PROVIDER - NSDCCPCAREPLAN_GEN_ALL_CORE_FT
PRINCIPAL DISCHARGE DIAGNOSIS  Diagnosis: CKD (chronic kidney disease), stage IV  Assessment and Plan of Treatment: You were admitted for worsening kidney function and elevated potassium. You refused to be treated and worked up in the hospitall. You don't want to have dialysis ultimately. You agreed to leave the hospital AMA. You need to follow up with your doctor as soon as possible. There is a high risk of death if you don't seek medical attention      SECONDARY DISCHARGE DIAGNOSES  Diagnosis: Hyperkalemia  Assessment and Plan of Treatment: PRINCIPAL DISCHARGE DIAGNOSIS  Diagnosis: Septic shock  Assessment and Plan of Treatment: You were in septic shock and bacteremiac likely from chronic osteomyelitis. Infectious disease recommended vancomycin that has side effects on kidney for CKD patients. As per ID no role for prolonged IV therapy in chronic OM. You refused blood draws and looked depress from your multiple comorbidities. Hospice was consulted and in agreement with you and your family you are being discharged to [facility] for hospice care.      SECONDARY DISCHARGE DIAGNOSES  Diagnosis: Decreased oral intake  Assessment and Plan of Treatment: continue with dronabinol 2.5 mg bid    Diagnosis: Hypothyroidism  Assessment and Plan of Treatment: Thyroid Stimulating Hormone, Serum: 14.10 uIU/mL (01.13.20 @ 11:29)  - continue with synthroid  - please check TSH as an OP to decide on dosage adjustments    Diagnosis: Hypoglycemia  Assessment and Plan of Treatment: Hypoglycemia is likely due to chronic malnutrition vs methadone (some case reports about hypoglycemia)  - Endocrine F/u :It appears that the patient's mild-moderate hypoglycemia is related to markedly reduced muscle mass and may in part be related to opiates.  Would encourage nutrition, using appetite stimulants.  - Methadone discontinued and patient was encouraged to not to take methadone.  - recommendation is to hold methadone and oxycodone and check FS, if pt is drowsy    Diagnosis: Hyperkalemia  Assessment and Plan of Treatment: You were admitted for worsening kidney function and elevated potassium.  You have CKD stage . You were treated for acidosis and hyperkalemia and when discussed about hemodialysis you refused hemodialysis and blood works. Hospice was consulted and in agreement with you and your family you are being discharged to [facility] for hospice care.

## 2020-01-11 NOTE — DISCHARGE NOTE PROVIDER - HOSPITAL COURSE
59 years old male known to have PVD s/p right side AKA and left below knee amputation, paraplegia with multiple chronic bilateral buttock and sacral stage 4 ulcers, suprapubic catheter, HTN (not on any medication), CKD stage IV (not following with nephrology), hypothyroidism, opioid dependance, recent admission for cold left foot (chronic left foot dry gangrene, h/o esophageal candidiasis was sent to the rehab due to worsening kidney functions and hypoglycemia with FS 45.        pt himself denies any chest pain, palpitations, shortness of breath, headache, lightheadedness, vision change, abdominal pain, nausea, vomiting, diarrhea, constipation, foul small urine from suprapubic cath.        Pt only states he had cloudy urine for past few days. states even if needed he will not go for hemodialysis.        Hospital Course:    Patient was supposed to get labs this morning but it was not drawn because he "was being transfused" per documentation. He is admitted for leukocytosis, hypoglycemia and worsening kidney function. He is refusing to stay and get worked up and wants to go back home where he lives. I explained to him that he might diet if he goes home and he is adamantly refusing to stay. He also said he takes a lot of pain medications and the med rec was not completed. He said he takes methadone 80 mg q6h and gabapentin 800 mg q6h ( he is not a reliable historian).     He agrees to leave [place] and pay his transportation. 59 years old male known to have PVD s/p right side bKA and left below knee amputation, paraplegia with multiple chronic bilateral buttock and sacral stage 4 ulcers, suprapubic catheter, HTN (not on any medication), CKD stage IV (not following with nephrology), hypothyroidism, opioid dependance,  h/o esophageal candidiasis was sent from rehab due to worsening kidney functions and hypoglycemia.     Pt only states he had cloudy urine for past few days. states even if needed he will not go for hemodialysis. (10 Toni 2020 21:52)    During the hospital course he was treated with multiple following problems with their mangements:        # Unresponsiveness episodes - sec to methadone and Hypoglycemia-resolved by D5 water and discontinuing Methadone     # Hypoglycemia, likely due to chronic malnutrition vs methadone (some case reports about hypoglycemia)    -HbA1c 4.8 (1/11/2020)    - Endocrine F/u :It appears that the patient's mild-moderate hypoglycemia is related to markedly reduced muscle mass and may in part be related to opiates.  Would encourage nutrition, using appetite stimulants.    - dose of methadone decreased    -instructed  to hold methadone and oxycodone , if pt is drowsy        # Suicidal ideation    - Psychiatry F/u: patient is a chronic risk for suicide however is not considered an imminent danger to himself or others and does not need inpatient psychiatric hospitalization. Patient seems to have been evaluated and accepted for hospice care, he would benefit from being in a supervised setting as he does not seem pt be able to take care of himself and has no access to home care    - Low acute risk for suicide. An element of depression is present due to multiple comorbidities    - Recommended enhanced supervision but no psychiatric medication     - he may however benefit from supportive psychotherapy at home if he is discharged home or if the supervised hospice facility provides this service.     - no psychiatric contraindications to discharge        # Sepsis- resolved    #  MRSA bactremia    - Blood culture:  Methicillin resistant Staphylococcus aureus (MRSA): Detec (01.15.20 @ 15:45)    - blood Culture Results: No growth to date. (01.16.20 @ 07:24)    - MRSA PCR Result.: Positive: By: Real-Time PCR (Polymerase Reaction Method) (12.02.19 @ 18:50)    -  Xray Chest 1 View AP/PA (01.10.20 @ 14:04) >No consolidation, effusion or pneumothorax.    - echo: no vegetation seen    - Pt does not want  blood draws and doesnot want to be on IV antibiotics.    - c/w  doxycycline 100mg  PO q12 for 4 weeks        # Sacral and buttock pressure sores    - Burn eval:  Healing sacral and buttock pressure sores--> no infection    - soap and water qd, santyl ointment and dakin solution wet to dry dressing , ABD pad dressing change qd    - no surgery needed        # Esophageal candidiasis with erosive gastritis    - EGD (12.27.19 @ 09:30) >esophagitis in the lower third of the esophagus compatible with non-erosive esophagitis. (Biopsy).  Ulcers in the upper third of the esophagus and middle third of the esophagus.(Biopsy). Erosions in the antrum compatible with erosive gastritis. (Biopsy). Erythema in the stomach body and fundus compatible with non-erosive gastritis.(Biopsy).     - Surgical Pathology Report (12.27.19 @ 12:30)  Esophageal ulcers, biopsy: Fragments of esophageal squamous mucosa, with Candida esophagitis (see Comment). GMS stain highlights the budding yeastlike fungi andpseuohyphae of Candida.  Mid esophagus, biopsy:- Fragments of esophageal squamous mucosa with mild nonspecific chronic inflammation.    - completed course with  flucanazole        # PVD with B/l BKA    - vascular surgery F/u : removed staples from LLE stump. Incision was well healed and staples were removed        # Decrease PO intake     - c/w marinol        # CKD stage 4, acidosis    - Patient refused dialysis    - c/w phoslo, renagel    - c/w sodium bicarb        # Hypothyroidism    - Thyroid Stimulating Hormone, Serum: 14.10 uIU/mL (01.13.20 @ 11:29)    - c/w synthroid    - please check TSH as an OP to decide on dosage adjustments        # No evidence of adrenal insufficiency    - Work-up last month ruled out the usual endocrine causes (cosyntropin test was normal, insulin levels were low-normal when glucose was low    -  hydrocortisone discontinued        # Vit D def     -c/w cholecalciferol        # Chronic opiate dependence     - follows up with outpt  pain management dr Mathew c/w his home meds        # Goals of care: discussed: Pt wants to be DNR/ DNI/ no feeding tube/no dialysis/ limited medical intervention/no blood draws, no IV antibiotics. Agreed to the Hospice care 59 years old male known to have PVD s/p right side AKA and left below knee amputation, paraplegia with multiple chronic bilateral buttock and sacral stage 4 ulcers, suprapubic catheter, HTN (not on any medication), CKD stage IV (not following with nephrology), hypothyroidism, opioid dependance, recent admission for cold left foot (chronic left foot dry gangrene, h/o esophageal candidiasis was sent to the rehab due to worsening kidney functions and hypoglycemia with FS 45.        Patient was readmitted to hospital on 10-Toni-2020 for septic shock    In the ED, pt remained hemodynamically stable but looks very pale, no active bleeding, lab workup showed leukocytosis of 19, normo anemia with Hb 7.7 (vbg 8.7), potassium 6.5 (hemolyzed) repeat 5.2, anion gap metabolic acidosis, worsening CKD 4 with creat 4.1 (baseline 3.5)        1/21/2020: Pt refused every IV antibiotics and labs and want hospice. He showed suicidal ideation to his mother, Mother told the nurse. Consulted psychiatry        # Septic shock     - Leukocytosis improved from 27K to 6K.    - Likely the source is sacral ulcer - chronic OM    - Was on NS then was on D5 with HCO3 and now refusing fluids.     - Echo: No vegetations can bee seen on TTE    - ID recommended Vanco, but CKD 4 and patient not agreeable to dialysis had to choose oral antibiotics    - As per ID - No role for prolonged IV therapy in chronic OM. Continue offloading and nutritional support    - Started Doxy 100mg bid    - Pt refused every IV antibiotics and lab        # Suicidal ideation    - He showed suicidal ideation to his mother, Mother told the nurse.     - Consulted psychiatry -> Low acute risk for suicide. An element of depression is present due to multiple comorbidities    - Recommended enhanced supervision but no psychiatric medication     - he may however benefit from supportive psychotherapy at home if he is discharged home or if the supervised hospice facility provides this service.     - no psychiatric contraindications to discharge        # CKD 4 - Creatinine stable around 4.4:    - Creatinine stable around 4.4 , no indication for RRT    - Nephro is following, will f/u OP    - Pt refused hemodialysis and other medication recommended by nephro        # Metabolic acidosis: Non anion gap metabolic acidosis, bicarb improved from 13 to 20.    - increased sodium bicarb to 1300 q 8    - s/p D5 with HCO3 - Now refusing Iv drip        # Hypoglycemia    -No evidence of adrenal insufficiency from blood work before in dec 2019 cosyntropin test negative, no need for hydrocortisone taper to 10mg for 1 week and stop it.    -Low insulin level of 2 , c peptide within normal limits in dec 2019    - Work-up last month ruled out the usual endocrine causes (cosyntropin test was normal, insulin levels were low-normal when glucose was low    - tapered HC and will discontinue 10 mg of hydrocortisone on 1/24    - DDs likley malnutrition Vs Methadone induced    - (discontinued methadone for hypoglycemia and giving Oxycodone IR 30 q4 and ER 10 q 12)     - On D5 iv fluid. if hypoglycemic, push D50 1 amp again        1/23/2020: Pt was hypoglycemic overnight with 23 FS, D50 and narcan was pushed and D10 drip was started. Discontinued methadone        # hypothyroidism    - c/w levothyroxine 100. TSH 14 (going down)    - OP f/u with endo        # Acute normo anemia    - no active bleed    - keep active type and screen    - transfuse to keep Hb > 7    - Iron studies from Nov 2019 shows anemia of chronic disease + iron def    - Started Aranesp 20 mcg q weekly        # PVD s/p right side AKA and left below knee amputation    - incision sites look clean    - Removal of staples from LLE stump on 1/1        # Pt looks malnourished    - started megace        # Sacral and buttock pressure sores    - Burn eval:  Healing sacral and buttock pressure sores--> no infection as per burn     - soap and water qd, santyl ointment and dakin solution wet to dry dressing , ABD pad dressing change qd    - no surgery needed        PLAN: Discharge and readmission to Hospice service.

## 2020-01-12 NOTE — PROGRESS NOTE ADULT - ASSESSMENT
59 years old male known to have PVD s/p right side AKA and left below knee amputation, paraplegia with multiple chronic bilateral buttock and sacral stage 4 ulcers, suprapubic catheter, HTN (not on any medication), CKD stage IV (not following with nephrology), hypothyroidism, opioid dependance severe ASPVD presenting with Acidosis  ·	CKD 4 / creat at baseline n o further renal work up  ·	check IP and PTH can be done as OP  ·	Acidosis probably related to CKD / increase sodium bicarb to 1300 mg po q8h  ·	follow BMP:  ·	Anemia chronic / check Fe studies will need LINDY sq as OP  ·	Check TSH    will follow

## 2020-01-12 NOTE — DIETITIAN INITIAL EVALUATION ADULT. - ENERGY NEEDS
calorie: 1803 - 2103 kcals/day (30-35 kcals/kg for multiple PU)  protein: 54 - 66 gms/day (0.9 - 1.1 gm/kg for PU and CKD4 not on HD)  fluid: 1ml/kcal or per lIP

## 2020-01-12 NOTE — DIETITIAN INITIAL EVALUATION ADULT. - PHYSICAL APPEARANCE
BMI 19.6 using height 73 inches and adjusted for b/l BKA. On last adm pt weighed 63.4 kg (however this was before BKA). Stage IV R buttocks + sacrum, stage II L IT. no edema

## 2020-01-12 NOTE — DIETITIAN INITIAL EVALUATION ADULT. - OTHER INFO
Pt refusing labs and meds. He signed AMA papers yesterday. Worsening of CKD 4. Hyperkalemia 6.5 > 5.2-->5.3. Metabolic acidosis. paraplegia with multiple chronic bilateral buttock and sacral stage 4 ulcers. Behavioral abnormalities - psych c/s. suspected folate / vit D / Vit b12 Deficiency.  h/o esophageal candidiasis.

## 2020-01-12 NOTE — PROGRESS NOTE ADULT - ASSESSMENT
59 years old male known to have PVD s/p right side AKA and left below knee amputation, paraplegia with multiple chronic bilateral buttock and sacral stage 4 ulcers, suprapubic catheter, HTN (not on any medication), CKD stage IV (not following with nephrology), hypothyroidism, opioid dependance, recent admission for cold left foot (chronic left foot dry gangrene, h/o esophageal candidiasis was sent to the rehab due to worsening kidney functions and hypoglycemia with FS 45.    In the ED, pt remained hemodynamically stable but looks very pale, no active bleeding, lab workup showed leukocytosis of 19, normo anemia with Hb 7.7 (vbg 8.7), potassium 6.5 (hemolyzed) repeat 5.2, anion gap metabolic acidosis, worsening CKD 4 with creat 4.1 (baseline 3.5), , cxr ok.    # Worsening of CKD 4  # Hyperkalemia 6.5 > 5.2-->5.3  # Metabolic acidosis    will increase sodium bicarb to 1300 q 8  f/u calcium and IPTH, iron studies  Pt non oliguric  w/up can be done outpt    # Leukocytosis  # paraplegia with multiple chronic bilateral buttock and sacral stage 4 ulcers  CXR negative   likely secondary to stage 4 bilateral buttock and sacral ulcer  cw vanco and cefepime for now, change as needed acc to blood and wound Cx  f/u MRSA PCR  burn eval  fu blood Cx    # Acute normo anemia  Pt very pale on examination  no active bleed  keep active type and screen  transfuse to keep Hb > 7  Iron studies from Nov 2019 shows anemia of chronic disease + iron def  fu nephro eval if venofer needed      #  PVD s/p right side AKA and left below knee amputation  incision sites look clean    # hypothyroidism  will increase levothyroxine to 125 given high TSH 28  f/u repeat TSH and Endo f/u    # h/o esophageal candidiasis  cw fluconazole  outpt GI fu    # opioid dependance  cw methadone f/u qtc  outpt clinic fu on discharge    # HTN (not on any medication)      # suspected folate / vit D / Vit b12 DEF  Fu level and start if needed    # Pt looks malnourished  fu nutrition eval      DVT PPX: heparin  GI PPx: not indicated  Diet: dash  Activity: bed rest 59 years old male known to have PVD s/p right side AKA and left below knee amputation, paraplegia with multiple chronic bilateral buttock and sacral stage 4 ulcers, suprapubic catheter, HTN (not on any medication), CKD stage IV (not following with nephrology), hypothyroidism, opioid dependance, recent admission for cold left foot (chronic left foot dry gangrene, h/o esophageal candidiasis was sent to the rehab due to worsening kidney functions and hypoglycemia with FS 45.    In the ED, pt remained hemodynamically stable but looks very pale, no active bleeding, lab workup showed leukocytosis of 19, normo anemia with Hb 7.7 (vbg 8.7), potassium 6.5 (hemolyzed) repeat 5.2, anion gap metabolic acidosis, worsening CKD 4 with creat 4.1 (baseline 3.5), , cxr ok.    # Worsening of CKD 4  # Hyperkalemia 6.5 > 5.2-->5.3  # Metabolic acidosis    will increase sodium bicarb to 1300 q 8  f/u calcium and IPTH, iron studies  Pt non oliguric  w/up can be done outpt    # Leukocytosis  # paraplegia with multiple chronic bilateral buttock and sacral stage 4 ulcers  CXR negative   likely secondary to stage 4 bilateral buttock and sacral ulcer  cw vanco and cefepime for now, change as needed acc to blood and wound Cx  f/u MRSA PCR  burn eval  fu blood Cx    # Behavioral abnormalities  f/u psych eval    # Acute normo anemia  Pt very pale on examination  no active bleed  keep active type and screen  transfuse to keep Hb > 7  Iron studies from Nov 2019 shows anemia of chronic disease + iron def  fu nephro eval if venofer needed      #  PVD s/p right side AKA and left below knee amputation  incision sites look clean    # hypothyroidism  will increase levothyroxine to 125 given high TSH 28  f/u repeat TSH and Endo f/u    # h/o esophageal candidiasis  cw fluconazole  outpt GI fu    # opioid dependance  cw methadone f/u qtc  outpt clinic fu on discharge    # HTN (not on any medication)      # suspected folate / vit D / Vit b12 DEF  Fu level and start if needed    # Pt looks malnourished  fu nutrition eval      DVT PPX: heparin  GI PPx: not indicated  Diet: dash  Activity: bed rest

## 2020-01-12 NOTE — DIETITIAN INITIAL EVALUATION ADULT. - FACTORS AFF FOOD INTAKE
pt refused to speak w/ RD. Wanting to leave AMA. Pt is well known to nutrition department. Poor appetite x few months. Pt dislikes ALL nutrition supplements we offer. he has signed diet refusals in the past. LBM 1/12 per EMR. NFKA.

## 2020-01-12 NOTE — PROGRESS NOTE ADULT - SUBJECTIVE AND OBJECTIVE BOX
Nephrology progress note    THIS IS AN INCOMPLETE NOTE . FULL NOTE TO FOLLOW SHORTLY    Patient is seen and examined, events over the last 24 h noted .    Allergies:  sulfamethizole (Unknown)    Hospital Medications:   MEDICATIONS  (STANDING):  amitriptyline 25 milliGRAM(s) Oral at bedtime  calcium acetate 667 milliGRAM(s) Oral three times a day with meals  cefepime   IVPB 500 milliGRAM(s) IV Intermittent every 24 hours  cefepime   IVPB      chlorhexidine 4% Liquid 1 Application(s) Topical <User Schedule>  cholecalciferol 2000 Unit(s) Oral daily  fluconAZOLE   Tablet 50 milliGRAM(s) Oral <User Schedule>  heparin  Injectable 5000 Unit(s) SubCutaneous every 12 hours  hydrocortisone 20 milliGRAM(s) Oral daily  influenza   Vaccine 0.5 milliLiter(s) IntraMuscular once  levothyroxine 100 MICROGram(s) Oral daily  methadone    Tablet 80 milliGRAM(s) Oral every 8 hours  oxybutynin 10 milliGRAM(s) Oral two times a day  oxyCODONE  ER Tablet 20 milliGRAM(s) Oral every 12 hours  sevelamer carbonate 800 milliGRAM(s) Oral three times a day with meals  sodium bicarbonate 650 milliGRAM(s) Oral three times a day  vancomycin  IVPB 750 milliGRAM(s) IV Intermittent every 24 hours        VITALS:  T(F): 98.5 (20 @ 07:48), Max: 98.5 (20 @ 07:48)  HR: 102 (20 @ 09:15)  BP: 91/57 (20 @ 09:15)  RR: 18 (20 @ 07:48)  SpO2: 97% (20 @ 07:48)  Wt(kg): --        PHYSICAL EXAM:  Constitutional: NAD  HEENT: anicteric sclera, oropharynx clear, MMM  Neck: No JVD  Respiratory: CTAB, no wheezes, rales or rhonchi  Cardiovascular: S1, S2, RRR  Gastrointestinal: BS+, soft, NT/ND  Extremities: No cyanosis or clubbing. No peripheral edema  :  No brown.   Skin: No rashes    LABS:      132<L>  |  107  |  64<HH>  ----------------------------<  100<H>  5.1<H>   |  14<L>  |  4.1<HH>    Ca    7.4<L>      2020 16:32  Phos  8.6         TPro  4.1<L>  /  Alb  1.3<L>  /  TBili  <0.2  /  DBili      /  AST  12  /  ALT  11  /  AlkPhos  178<H>                            8.0    11.13 )-----------( 291      ( 2020 16:32 )             25.6       Urine Studies:  Urinalysis Basic - ( 10 Toni 2020 23:40 )    Color: Yellow / Appearance: Slightly Turbid / S.013 / pH:   Gluc:  / Ketone: Trace  / Bili: Negative / Urobili: <2 mg/dL   Blood:  / Protein: 300 mg/dL / Nitrite: Negative   Leuk Esterase: Small / RBC: 15 /HPF / WBC 62 /HPF   Sq Epi:  / Non Sq Epi: 0 /HPF / Bacteria: Many        RADIOLOGY & ADDITIONAL STUDIES: Nephrology progress note  Patient is seen and examined, events over the last 24 h noted .  Denied any complaints  No chest pain no SOB  Allergies:  sulfamethizole (Unknown)    Hospital Medications:   MEDICATIONS  (STANDING):  amitriptyline 25 milliGRAM(s) Oral at bedtime  calcium acetate 667 milliGRAM(s) Oral three times a day with meals  cefepime   IVPB 500 milliGRAM(s) IV Intermittent every 24 hours  cholecalciferol 2000 Unit(s) Oral daily  fluconAZOLE   Tablet 50 milliGRAM(s) Oral <User Schedule>  heparin  Injectable 5000 Unit(s) SubCutaneous every 12 hours  hydrocortisone 20 milliGRAM(s) Oral daily  influenza   Vaccine 0.5 milliLiter(s) IntraMuscular once  levothyroxine 100 MICROGram(s) Oral daily  methadone    Tablet 80 milliGRAM(s) Oral every 8 hours  oxybutynin 10 milliGRAM(s) Oral two times a day  oxyCODONE  ER Tablet 20 milliGRAM(s) Oral every 12 hours  sevelamer carbonate 800 milliGRAM(s) Oral three times a day with meals  sodium bicarbonate 650 milliGRAM(s) Oral three times a day  vancomycin  IVPB 750 milliGRAM(s) IV Intermittent every 24 hours        VITALS:  T(F): 98.5 (20 @ 07:48), Max: 98.5 (20 @ 07:48)  HR: 102 (20 @ 09:15)  BP: 91/57 (20 @ 09:15)  RR: 18 (20 @ 07:48)  SpO2: 97% (20 @ 07:48)          PHYSICAL EXAM:  Constitutional: NAD  HEENT: subicteric sclera, oropharynx clear, MMM  Neck: No JVD  Respiratory: CTAB, no wheezes, rales or rhonchi  Cardiovascular: S1, S2, RRR  Gastrointestinal: BS+, soft, NT/ND  Extremities: No cyanosis or clubbing. bilateral BKA  :  No brown.   Skin: No rashes    LABS:      136  |  110  |  63<HH>  ----------------------------<  86  5.3<H>   |  13<L>  |  4.1<HH>    Ca    7.4<L>      2020 11:09  Phos  8.6         TPro  4.1<L>  /  Alb  1.4<L>  /  TBili  0.2  /  DBili  x   /  AST  11  /  ALT  10  /  AlkPhos  181<H>      132<L>  |  107  |  64<HH>  ----------------------------<  100<H>  5.1<H>   |  14<L>  |  4.1<HH>    Ca    7.4<L>      2020 16:32  Phos  8.6         TPro  4.1<L>  /  Alb  1.3<L>  /  TBili  <0.2  /  DBili      /  AST  12  /  ALT  11  /  AlkPhos  178<H>                            8.0    11.13 )-----------( 291      ( 2020 16:32 )             25.6       Urine Studies:  Urinalysis Basic - ( 10 Toni 2020 23:40 )    Color: Yellow / Appearance: Slightly Turbid / S.013 / pH:   Gluc:  / Ketone: Trace  / Bili: Negative / Urobili: <2 mg/dL   Blood:  / Protein: 300 mg/dL / Nitrite: Negative   Leuk Esterase: Small / RBC: 15 /HPF / WBC 62 /HPF   Sq Epi:  / Non Sq Epi: 0 /HPF / Bacteria: Many        RADIOLOGY & ADDITIONAL STUDIES:

## 2020-01-12 NOTE — PROGRESS NOTE ADULT - SUBJECTIVE AND OBJECTIVE BOX
MARGE BELLA 59y Male  MRN#: [id number]   CODE STATUS       SUBJECTIVE  Patient is a 59y old Male who presents with a chief complaint of Currently admitted to medicine with the primary diagnosis of CKD (chronic kidney disease), stage IV    Today is hospital day 2d, and this morning he is upset and is refusing labs, vitals and wants to go home. Per pt he wants to leave AMA he signed AMA papers yesterday but could not leave as no one was at his home to open up and no one could provide transport. Even today pt wanted to leave AMA but he could not afford the ambulance so he is still here. He is refusing all medications.       OBJECTIVE  PAST MEDICAL & SURGICAL HISTORY  Anemia  PAD (peripheral artery disease)  Hypertension  Suprapubic catheter  Pressure ulcer  Diabetes  Lumbar compression fracture  S/P below knee amputation, left  S/P below knee amputation, right  S/P debridement  Toe amputation status, right  H/O hernia repair    ALLERGIES:  sulfamethizole (Unknown)    MEDICATIONS:  STANDING MEDICATIONS  amitriptyline 25 milliGRAM(s) Oral at bedtime  calcium acetate 667 milliGRAM(s) Oral three times a day with meals  cefepime   IVPB 500 milliGRAM(s) IV Intermittent every 24 hours  cefepime   IVPB      chlorhexidine 4% Liquid 1 Application(s) Topical <User Schedule>  cholecalciferol 2000 Unit(s) Oral daily  fluconAZOLE   Tablet 50 milliGRAM(s) Oral <User Schedule>  heparin  Injectable 5000 Unit(s) SubCutaneous every 12 hours  hydrocortisone 20 milliGRAM(s) Oral daily  influenza   Vaccine 0.5 milliLiter(s) IntraMuscular once  levothyroxine 100 MICROGram(s) Oral daily  methadone    Tablet 80 milliGRAM(s) Oral every 8 hours  oxybutynin 10 milliGRAM(s) Oral two times a day  oxyCODONE  ER Tablet 20 milliGRAM(s) Oral every 12 hours  sevelamer carbonate 800 milliGRAM(s) Oral three times a day with meals  sodium bicarbonate 650 milliGRAM(s) Oral three times a day  vancomycin  IVPB 750 milliGRAM(s) IV Intermittent every 24 hours    PRN MEDICATIONS  diazepam    Tablet 5 milliGRAM(s) Oral two times a day PRN  oxyCODONE    IR 30 milliGRAM(s) Oral every 8 hours PRN      VITAL SIGNS: Last 24 Hours  T(C): 36.9 (2020 07:48), Max: 36.9 (2020 07:48)  T(F): 98.5 (2020 07:48), Max: 98.5 (2020 07:48)  HR: 102 (2020 09:15) (99 - 102)  BP: 91/57 (2020 09:15) (88/57 - 108/59)  BP(mean): --  RR: 18 (2020 07:48) (18 - 18)  SpO2: 97% (2020 07:48) (97% - 99%)    LABS:                        8.0    11.13 )-----------( 291      ( 2020 16:32 )             25.6     12    136  |  110  |  63<HH>  ----------------------------<  86  5.3<H>   |  13<L>  |  4.1<HH>    Ca    7.4<L>      2020 11:09  Phos  8.6         TPro  4.1<L>  /  Alb  1.4<L>  /  TBili  0.2  /  DBili  x   /  AST  11  /  ALT  10  /  AlkPhos  181<H>  12      Urinalysis Basic - ( 10 Toni 2020 23:40 )    Color: Yellow / Appearance: Slightly Turbid / S.013 / pH: x  Gluc: x / Ketone: Trace  / Bili: Negative / Urobili: <2 mg/dL   Blood: x / Protein: 300 mg/dL / Nitrite: Negative   Leuk Esterase: Small / RBC: 15 /HPF / WBC 62 /HPF   Sq Epi: x / Non Sq Epi: 0 /HPF / Bacteria: Many        Creatine Kinase, Serum: 16 U/L (20 @ 16:32)  Troponin T, Serum: 0.14 ng/mL <HH> (20 @ 16:32)      CARDIAC MARKERS ( 2020 16:32 )  x     / 0.14 ng/mL / 16 U/L / x     / 2.5 ng/mL      RADIOLOGY:  < from: Xray Chest 1 View AP/PA (01.10.20 @ 14:04) >  Impression:      No consolidation, effusion or pneumothorax.    < end of copied text >      PHYSICAL EXAM:    pt refused exam

## 2020-01-13 NOTE — PROGRESS NOTE ADULT - SUBJECTIVE AND OBJECTIVE BOX
SUBJECTIVE:    Patient is a 59y old Male who presents with a chief complaint of   Currently admitted to medicine with the primary diagnosis of CKD (chronic kidney disease), stage IV     Today is hospital day 3d. This morning he is resting comfortably in bed and reports no new issues or overnight events.   Patient has been refusing physical exam and IVs    PAST MEDICAL & SURGICAL HISTORY  Anemia  PAD (peripheral artery disease)  Hypertension  Suprapubic catheter  Pressure ulcer  Diabetes  Lumbar compression fracture  S/P below knee amputation, left  S/P below knee amputation, right  S/P debridement  Toe amputation status, right  H/O hernia repair    SOCIAL HISTORY:  Negative for smoking/alcohol/drug use.     ALLERGIES:  sulfamethizole (Unknown)    MEDICATIONS:  STANDING MEDICATIONS  amitriptyline 25 milliGRAM(s) Oral at bedtime  calcium acetate 667 milliGRAM(s) Oral three times a day with meals  cefepime   IVPB 500 milliGRAM(s) IV Intermittent every 24 hours  cefepime   IVPB      cephalexin 250 milliGRAM(s) Oral every 8 hours  chlorhexidine 4% Liquid 1 Application(s) Topical <User Schedule>  cholecalciferol 2000 Unit(s) Oral daily  fluconAZOLE   Tablet 50 milliGRAM(s) Oral <User Schedule>  heparin  Injectable 5000 Unit(s) SubCutaneous every 12 hours  hydrocortisone 20 milliGRAM(s) Oral daily  influenza   Vaccine 0.5 milliLiter(s) IntraMuscular once  levothyroxine 100 MICROGram(s) Oral daily  methadone    Tablet 80 milliGRAM(s) Oral every 8 hours  oxybutynin 10 milliGRAM(s) Oral two times a day  oxyCODONE  ER Tablet 20 milliGRAM(s) Oral every 12 hours  sevelamer carbonate 800 milliGRAM(s) Oral three times a day with meals  sodium bicarbonate 1300 milliGRAM(s) Oral three times a day  vancomycin  IVPB 750 milliGRAM(s) IV Intermittent every 24 hours    PRN MEDICATIONS  diazepam    Tablet 5 milliGRAM(s) Oral two times a day PRN  oxyCODONE    IR 30 milliGRAM(s) Oral every 6 hours PRN    VITALS:   T(F): 97.7  HR: 92  BP: 104/58  RR: 18  SpO2: 99%    LABS:                        8.3    13.21 )-----------( 335      ( 13 Jan 2020 11:29 )             25.9     01-13    135  |  108  |  59<H>  ----------------------------<  68<L>  5.3<H>   |  13<L>  |  4.1<HH>    Ca    7.3<L>      13 Jan 2020 11:29  Phos  8.6     01-11    TPro  4.1<L>  /  Alb  1.4<L>  /  TBili  0.2  /  DBili  x   /  AST  11  /  ALT  10  /  AlkPhos  181<H>  01-12              CARDIAC MARKERS ( 11 Jan 2020 16:32 )  x     / 0.14 ng/mL / 16 U/L / x     / 2.5 ng/mL    PHYSICAL EXAM:  GEN: No acute distress, pale, NC/AT  LUNGS: Clear to auscultation bilaterally, no wheezes   HEART: S1/S2 present. RRR. No murmurs  ABD: Soft, non-tender, non-distended. Bowel sounds present  EXT: L BKA, R AKA. Sacral ulcers and lower back ulcer, foul smelling with surrouding erythema  NEURO: AAOX3    Intravenous access: No IV access  NG tube: None  Posadas Catheter: None

## 2020-01-14 NOTE — CONSULT NOTE ADULT - ASSESSMENT
IMPRESSION: Rehab of miriam bka / ckd    PRECAUTIONS: [  ] Cardiac  [  ] Respiratory  [  ] Seizures [  ] Contact Isolation  [  ] Droplet Isolation  [  ] Other    Weight Bearing Status:     RECOMMENDATION:    Out of Bed to Chair     DVT/Decubiti Prophylaxis    REHAB PLAN:     [   ] Bedside P/T 3-5 times a week   [ x  ]   Bedside O/T  2-3 times a week             [   ] No Rehab Therapy Indicated                   [   ]  Speech Therapy   Conditioning/ROM                                    ADL  Bed Mobility                                               Conditioning/ROM  Transfers                                                     Bed Mobility  Sitting /Standing Balance                         Transfers                                        Gait Training                                               Sitting/Standing Balance  Stair Training [   ]Applicable                    Home equipment Eval                                                                        Splinting  [   ] Only      GOALS:   ADL   [ x  ]   Independent                    Transfers  [ x  ] Independent                          Ambulation  [   ] Independent     [    ] With device                            [   ]  CG                                                         [   ]  CG                                                                  [   ] CG                            [    ] Min A                                                   [   ] Min A                                                              [   ] Min  A          DISCHARGE PLAN:   [   ]  Good candidate for Intensive Rehabilitation/Hospital based                                             Will tolerate 3hrs Intensive Rehab Daily                                       [  x  ]  Short Term Rehab in Skilled Nursing Facility                                       [    ]  Home with Outpatient or VN services                                         [    ]  Possible Candidate for Intensive Hospital based Rehab

## 2020-01-14 NOTE — CONSULT NOTE ADULT - CONSULT REQUESTED BY NAME
Detail Level: Detailed medicine Consultation Charge $ (Use Numbers Only, No Text Please.): 100 Send Procedure Quote As Charge: No

## 2020-01-14 NOTE — CONSULT NOTE ADULT - SUBJECTIVE AND OBJECTIVE BOX
HPI:  59 years old male known to have PVD s/p right side bka and left below knee amputation, paraplegia with multiple chronic bilateral buttock and sacral stage 4 ulcers, suprapubic catheter, HTN (not on any medication), CKD stage IV (not following with nephrology), hypothyroidism, opioid dependance, recent admission for cold left foot (chronic left foot dry gangrene, h/o esophageal candidiasis was sent to the rehab due to worsening kidney functions and hypoglycemia with FS 45.    pt himself denies any chest pain, palpitations, shortness of breath, headache, lightheadedness, vision change, abdominal pain, nausea, vomiting, diarrhea, constipation, foul small urine from suprapubic cath.    Pt only states he had cloudy urine for past few days. states even if needed he will not go for hemodialysis. (10 Toni 2020 21:52)      PAST MEDICAL & SURGICAL HISTORY:  Anemia  PAD (peripheral artery disease)  Hypertension  Suprapubic catheter  Pressure ulcer  Diabetes  Lumbar compression fracture  S/P below knee amputation, left  S/P below knee amputation, right  S/P debridement  Toe amputation status, right  H/O hernia repair      Hospital Course:    TODAY'S SUBJECTIVE & REVIEW OF SYMPTOMS:     Constitutional WNL   Cardio WNL   Resp WNL   GI WNL  Heme WNL  Endo WNL  Skin WNL  MSK Weakness  Neuro WNL  Cognitive WNL  Psych WNL      MEDICATIONS  (STANDING):  amitriptyline 25 milliGRAM(s) Oral at bedtime  calcium acetate 667 milliGRAM(s) Oral three times a day with meals  cefepime   IVPB 500 milliGRAM(s) IV Intermittent every 24 hours  cefepime   IVPB      cephalexin 250 milliGRAM(s) Oral every 8 hours  chlorhexidine 4% Liquid 1 Application(s) Topical <User Schedule>  cholecalciferol 2000 Unit(s) Oral daily  fluconAZOLE   Tablet 50 milliGRAM(s) Oral <User Schedule>  heparin  Injectable 5000 Unit(s) SubCutaneous every 12 hours  hydrocortisone 20 milliGRAM(s) Oral daily  influenza   Vaccine 0.5 milliLiter(s) IntraMuscular once  levothyroxine 100 MICROGram(s) Oral daily  methadone    Tablet 80 milliGRAM(s) Oral every 8 hours  oxybutynin 10 milliGRAM(s) Oral two times a day  oxyCODONE  ER Tablet 20 milliGRAM(s) Oral every 12 hours  sevelamer carbonate 800 milliGRAM(s) Oral three times a day with meals  sodium bicarbonate 1300 milliGRAM(s) Oral three times a day  vancomycin  IVPB 750 milliGRAM(s) IV Intermittent every 24 hours    MEDICATIONS  (PRN):  diazepam    Tablet 5 milliGRAM(s) Oral two times a day PRN anxiety  oxyCODONE    IR 30 milliGRAM(s) Oral every 6 hours PRN Severe Pain (7 - 10)      FAMILY HISTORY:  FH: atrial fibrillation: father      Allergies    sulfamethizole (Unknown)    Intolerances        SOCIAL HISTORY:    [  ] Etoh  [  ] Smoking  [  ] Substance abuse     Home Environment:  [  ] Home Alone  [  ] Lives with Family  [  ] Home Health Aid    Dwelling:  [  ] Apartment  [  ] Private House  [  ] Adult Home  [  ] Skilled Nursing Facility      [x  ] Short Term  [  ] Long Term  [  ] Stairs       Elevator [  ]    FUNCTIONAL STATUS PTA: (Check all that apply)  Ambulation: [   ]Independent    [  ] Dependent     [  ] Non-Ambulatory  Assistive Device: [  ] SA Cane  [  ]  Q Cane  [  ] Walker  [x  ]  Wheelchair  ADL : [  ] Independent  [x  ]  Dependent       Vital Signs Last 24 Hrs  T(C): 36.6 (14 Jan 2020 08:43), Max: 36.8 (14 Jan 2020 00:14)  T(F): 97.9 (14 Jan 2020 08:43), Max: 98.2 (14 Jan 2020 00:14)  HR: 90 (14 Jan 2020 08:43) (87 - 101)  BP: 120/58 (14 Jan 2020 08:43) (90/59 - 120/58)  BP(mean): --  RR: 18 (14 Jan 2020 08:43) (18 - 18)  SpO2: 99% (14 Jan 2020 08:43) (98% - 99%)      PHYSICAL EXAM: Alert & awake  GENERAL: NAD  HEAD:  Atraumatic, Normocephalic  CHEST/LUNG: Clear   HEART: S1S2+  ABDOMEN: Soft, Nontender  EXTREMITIES:  miriam bka    NERVOUS SYSTEM:  Cranial Nerves 2-12 intact [  ] Abnormal  [  ]  ROM: WFL all extremities [  ]  Abnormal [x  ]limited miriam le  Motor Strength: WFL all extremities  [  ]  Abnormal [x  ]limited miriam le  Sensation: intact to light touch [  ] Abnormal [  ]  Reflexes: Symmetric [  ]  Abnormal [  ]    FUNCTIONAL STATUS:  Bed Mobility: Independent [  ]  Supervision [  ]  Needs Assistance [ x ]  N/A [  ]  Transfers: Independent [  ]  Supervision [  ]  Needs Assistance [x  ]  N/A [  ]   Ambulation: Independent [  ]  Supervision [  ]  Needs Assistance [  ]  N/A [  ]  ADL: Independent [  ] Requires Assistance [  ] N/A [  ]      LABS:                        8.1    11.03 )-----------( 309      ( 14 Jan 2020 07:27 )             25.6     01-14    138  |  112<H>  |  62<HH>  ----------------------------<  73  4.5   |  14<L>  |  4.3<HH>    Ca    7.5<L>      14 Jan 2020 07:27  Mg     1.6     01-14    TPro  4.1<L>  /  Alb  1.4<L>  /  TBili  0.2  /  DBili  x   /  AST  11  /  ALT  10  /  AlkPhos  181<H>  01-12          RADIOLOGY & ADDITIONAL STUDIES:    Assesment:

## 2020-01-14 NOTE — PROGRESS NOTE ADULT - ASSESSMENT
59 years old male known to have PVD s/p right side AKA and left below knee amputation, paraplegia with multiple chronic bilateral buttock and sacral stage 4 ulcers, suprapubic catheter, HTN (not on any medication), CKD stage IV (not following with nephrology), hypothyroidism, opioid dependance severe ASPVD presenting with Acidosis  ·	CKD 4 / creat at baseline no further renal work up  ·	corrected Ca at goal. might hypomagnesemia noted, oral replacement.  ·	IP and PTH can be checked as OP  ·	Acidosis probably related to CKD / continue sodium bicarb 1300 mg po q8h  ·	follow BMP:  ·	Anemia chronic / check Fe studies will need LINDY sq as OP  ·	Check TSH    pt otherwise stable from renal standpoint and ok for d/c.

## 2020-01-14 NOTE — PROGRESS NOTE ADULT - SUBJECTIVE AND OBJECTIVE BOX
Nephrology progress note    Patient is seen and examined, events over the last 24 h noted .  no new complaints.    Allergies:  sulfamethizole (Unknown)    Hospital Medications:   MEDICATIONS  (STANDING):  amitriptyline 25 milliGRAM(s) Oral at bedtime  calcium acetate 667 milliGRAM(s) Oral three times a day with meals  cefepime   IVPB 500 milliGRAM(s) IV Intermittent every 24 hours  cefepime   IVPB      cephalexin 250 milliGRAM(s) Oral every 8 hours  chlorhexidine 4% Liquid 1 Application(s) Topical <User Schedule>  cholecalciferol 2000 Unit(s) Oral daily  fluconAZOLE   Tablet 50 milliGRAM(s) Oral <User Schedule>  heparin  Injectable 5000 Unit(s) SubCutaneous every 12 hours  hydrocortisone 20 milliGRAM(s) Oral daily  influenza   Vaccine 0.5 milliLiter(s) IntraMuscular once  levothyroxine 100 MICROGram(s) Oral daily  methadone    Tablet 80 milliGRAM(s) Oral every 8 hours  oxybutynin 10 milliGRAM(s) Oral two times a day  oxyCODONE  ER Tablet 20 milliGRAM(s) Oral every 12 hours  sevelamer carbonate 800 milliGRAM(s) Oral three times a day with meals  sodium bicarbonate 1300 milliGRAM(s) Oral three times a day  vancomycin  IVPB 750 milliGRAM(s) IV Intermittent every 24 hours        VITALS:  T(F): 97.9 (20 @ 08:43), Max: 98.2 (20 @ 00:14)  HR: 90 (20 @ 08:43)  BP: 120/58 (20 @ 08:43)  RR: 18 (20 @ 08:43)  SpO2: 99% (20 @ 08:43)          PHYSICAL EXAM:  Constitutional: NAD  HEENT: anicteric sclera, oropharynx clear, MMM  Neck: No JVD  Respiratory: CTAB, no wheezes, rales or rhonchi  Cardiovascular: S1, S2, RRR  Gastrointestinal: BS+, soft, NT/ND  Extremities: No cyanosis or clubbing. No peripheral edema  :  No brown.   Skin: No rashes    LABS:      138  |  112<H>  |  62<HH>  ----------------------------<  73  4.5   |  14<L>  |  4.3<HH>    Ca    7.5<L>      2020 07:27  Mg     1.6     -  Albumin, Serum: 1.4 g/dL (20 @ 11:09)               8.1    11.03 )-----------( 309      ( 2020 07:27 )             25.6       Urine Studies:  Urinalysis Basic - ( 10 Toni 2020 23:40 )    Color: Yellow / Appearance: Slightly Turbid / S.013 / pH:   Gluc:  / Ketone: Trace  / Bili: Negative / Urobili: <2 mg/dL   Blood:  / Protein: 300 mg/dL / Nitrite: Negative   Leuk Esterase: Small / RBC: 15 /HPF / WBC 62 /HPF   Sq Epi:  / Non Sq Epi: 0 /HPF / Bacteria: Many    RADIOLOGY & ADDITIONAL STUDIES:

## 2020-01-14 NOTE — CONSULT NOTE ADULT - ASSESSMENT
Healing sacral and buttock pressure sores--> no infection    rec: soap and water qd, santyl ointment and dakin solution wet to dry dressing , ABD pad dressing change qd    no surgery needed

## 2020-01-14 NOTE — PROGRESS NOTE ADULT - SUBJECTIVE AND OBJECTIVE BOX
SUBJECTIVE:    Patient is a 59y old Male who presents with a chief complaint of Elevated creatinine and hypoglycemia (13 Jan 2020 15:52)    Currently admitted to medicine with the primary diagnosis of CKD (chronic kidney disease), stage IV     Today is hospital day 4d. This morning he is resting comfortably in bed and reports no new issues or overnight events.     PAST MEDICAL & SURGICAL HISTORY  Anemia  PAD (peripheral artery disease)  Hypertension  Suprapubic catheter  Pressure ulcer  Diabetes  Lumbar compression fracture  S/P below knee amputation, left  S/P below knee amputation, right  S/P debridement  Toe amputation status, right  H/O hernia repair    SOCIAL HISTORY:  Negative for smoking/alcohol/drug use.     ALLERGIES:  sulfamethizole (Unknown)    MEDICATIONS:  STANDING MEDICATIONS  amitriptyline 25 milliGRAM(s) Oral at bedtime  calcium acetate 667 milliGRAM(s) Oral three times a day with meals  cefepime   IVPB 500 milliGRAM(s) IV Intermittent every 24 hours  cefepime   IVPB      cephalexin 250 milliGRAM(s) Oral every 8 hours  chlorhexidine 4% Liquid 1 Application(s) Topical <User Schedule>  cholecalciferol 2000 Unit(s) Oral daily  fluconAZOLE   Tablet 50 milliGRAM(s) Oral <User Schedule>  heparin  Injectable 5000 Unit(s) SubCutaneous every 12 hours  hydrocortisone 20 milliGRAM(s) Oral daily  influenza   Vaccine 0.5 milliLiter(s) IntraMuscular once  levothyroxine 100 MICROGram(s) Oral daily  methadone    Tablet 80 milliGRAM(s) Oral every 8 hours  oxybutynin 10 milliGRAM(s) Oral two times a day  oxyCODONE  ER Tablet 20 milliGRAM(s) Oral every 12 hours  sevelamer carbonate 800 milliGRAM(s) Oral three times a day with meals  sodium bicarbonate 1300 milliGRAM(s) Oral three times a day  vancomycin  IVPB 750 milliGRAM(s) IV Intermittent every 24 hours    PRN MEDICATIONS  diazepam    Tablet 5 milliGRAM(s) Oral two times a day PRN  oxyCODONE    IR 30 milliGRAM(s) Oral every 6 hours PRN    VITALS:   T(F): 97.9  HR: 90  BP: 120/58  RR: 18  SpO2: 99%    LABS:                        8.1    11.03 )-----------( 309      ( 14 Jan 2020 07:27 )             25.6     01-14    138  |  112<H>  |  62<HH>  ----------------------------<  73  4.5   |  14<L>  |  4.3<HH>    Ca    7.5<L>      14 Jan 2020 07:27  Mg     1.6     01-14    PHYSICAL EXAM:  GEN: No acute distress, pale, NC/AT  LUNGS: Clear to auscultation bilaterally, no wheezes   HEART: S1/S2 present. RRR. No murmurs  ABD: Soft, non-tender, non-distended. Bowel sounds present  EXT: L BKA, R AKA. Sacral ulcers and lower back ulcer, foul smelling with surrounding erythema  NEURO: AAOX3    Intravenous access: No IV access  NG tube: None  Posadas Catheter: None

## 2020-01-14 NOTE — CONSULT NOTE ADULT - ASSESSMENT
ASSESSMENT  60 yo M PVD s/p right side AKA and left below knee amputation, paraplegia with multiple chronic bilateral buttock and sacral stage 4 ulcers, suprapubic catheter, HTN (not on any medication), CKD stage IV (not following with nephrology), hypothyroidism, opioid dependance, recent admission for cold left foot (chronic left foot dry gangrene, h/o esophageal candidiasis was sent to the rehab due to worsening kidney functions and hypoglycemia     IMPRESSION  #Sacral ulcer, infected    Sepsis on admission P>90 WBC 18    10/2019 wcx heel MRSA, ecoli (R fluoro, unasyn, gent, bactrim, I zosyn), enterococcus  #CKD   #Malnutrition BMI (kg/m2): 17.5  #Abx allergy: sulfamethizole (Unknown)    RECOMMENDATIONS  - Burn consult  - Given CrCl, HOLD VANC and dose by daily levels, goal 15-20. Add on random level to AM labs  - Cefepime 500mg q24h IV  - No role for prolonged IV therapy in chronic OM. Continue offloading and nutritional support  This is an incomplete pended note, all final recommendations to follow.    Spectra [id number] ASSESSMENT  60 yo M PVD s/p right side AKA and left below knee amputation, paraplegia with multiple chronic bilateral buttock and sacral stage 4 ulcers, suprapubic catheter, HTN (not on any medication), CKD stage IV (not following with nephrology), hypothyroidism, opioid dependance, recent admission for cold left foot (chronic left foot dry gangrene, h/o esophageal candidiasis was sent to the rehab due to worsening kidney functions and hypoglycemia     IMPRESSION  #Sacral ulcer, infected    Sepsis on admission P>90 WBC 18    10/2019 wcx heel MRSA, ecoli (R fluoro, unasyn, gent, bactrim, I zosyn), enterococcus  #CKD   #Malnutrition BMI (kg/m2): 17.5  #Abx allergy: sulfamethizole (Unknown)    RECOMMENDATIONS  - Burn consult for debridement  - D/C VANC, risk of SILVINA outweighs benefit  - ADD doxy 100mg q12h IV and flagyl 500mg q8h IV   - CHANGE cefepime to Ceftriaxone 1g q24h IV   - No role for prolonged IV therapy in chronic OM. Continue offloading and nutritional support  - On discussion, Pt would like to go home and pursue hospice care    Spectra [id number]

## 2020-01-14 NOTE — CONSULT NOTE ADULT - SUBJECTIVE AND OBJECTIVE BOX
MARGE BELLA  59y, Male  Allergy: sulfamethizole (Unknown)      CHIEF COMPLAINT: Elevated creatinine and hypoglycemia (13 Jan 2020 15:52)      HPI:  59 years old male known to have PVD s/p right side AKA and left below knee amputation, paraplegia with multiple chronic bilateral buttock and sacral stage 4 ulcers, suprapubic catheter, HTN (not on any medication), CKD stage IV (not following with nephrology), hypothyroidism, opioid dependance, recent admission for cold left foot (chronic left foot dry gangrene, h/o esophageal candidiasis was sent to the rehab due to worsening kidney functions and hypoglycemia with FS 45.    pt himself denies any chest pain, palpitations, shortness of breath, headache, lightheadedness, vision change, abdominal pain, nausea, vomiting, diarrhea, constipation, foul small urine from suprapubic cath.    Pt only states he had cloudy urine for past few days. states even if needed he will not go for hemodialysis. (10 Toni 2020 21:52)      PAST MEDICAL & SURGICAL HISTORY:  Anemia  PAD (peripheral artery disease)  Hypertension  Suprapubic catheter  Pressure ulcer  Diabetes  Lumbar compression fracture  S/P below knee amputation, left  S/P below knee amputation, right  S/P debridement  Toe amputation status, right  H/O hernia repair      FAMILY HISTORY  FH: atrial fibrillation  No pertinent family history in first degree relatives  No pertinent family history in first degree relatives      SOCIAL HISTORY    Substance Use (  ) never used  (  ) IVDU (  ) Other:  Tobacco Usage:  (   ) never smoked   (   ) former smoker   (   ) current smoker   Alcohol Usage: (   ) social  (   ) daily use (   ) denies  Sexual History:       ROS  General: Denies rigors, nightsweats  HEENT: Denies headache, rhinorrhea, sore throat, eye pain  CV: Denies CP, palpitations  PULM: Denies wheezing, hemoptysis  GI: Denies hematemesis, hematochezia, melena  : Denies discharge, hematuria  MSK: Denies arthralgias, myalgias  SKIN: Denies rash, lesions  NEURO: Denies paresthesias, weakness  PSYCH: Denies depression, anxiety    VITALS:  T(F): 98.2, Max: 98.2 (01-14-20 @ 00:14)  HR: 87  BP: 109/68  RR: 18Vital Signs Last 24 Hrs  T(C): 36.8 (14 Jan 2020 00:14), Max: 36.8 (14 Jan 2020 00:14)  T(F): 98.2 (14 Jan 2020 00:14), Max: 98.2 (14 Jan 2020 00:14)  HR: 87 (14 Jan 2020 00:14) (87 - 101)  BP: 109/68 (14 Jan 2020 00:14) (90/59 - 109/68)  BP(mean): --  RR: 18 (14 Jan 2020 00:14) (18 - 18)  SpO2: 98% (14 Jan 2020 00:14) (98% - 98%)    PHYSICAL EXAM:  Gen: chronically ill appearing NAD, resting in bed  HEENT: Normocephalic, atraumatic  Neck: supple, no lymphadenopathy  CV: Regular rate & regular rhythm  Lungs: decreased BS at bases, no fremitus  Abdomen: Soft, BS present  Ext: Warm, well perfused  Neuro: non focal, awake  Skin: sacral decubiti  Lines: no phlebitis    TESTS & MEASUREMENTS:                        8.3    13.21 )-----------( 335      ( 13 Jan 2020 11:29 )             25.9     01-13    135  |  108  |  59<H>  ----------------------------<  68<L>  5.3<H>   |  13<L>  |  4.1<HH>    Ca    7.3<L>      13 Jan 2020 11:29    TPro  4.1<L>  /  Alb  1.4<L>  /  TBili  0.2  /  DBili  x   /  AST  11  /  ALT  10  /  AlkPhos  181<H>  01-12    eGFR if Non African American: 15 mL/min/1.73M2 (01-13-20 @ 11:29)  eGFR if African American: 18 mL/min/1.73M2 (01-13-20 @ 11:29)    LIVER FUNCTIONS - ( 12 Jan 2020 11:09 )  Alb: 1.4 g/dL / Pro: 4.1 g/dL / ALK PHOS: 181 U/L / ALT: 10 U/L / AST: 11 U/L / GGT: x               Culture - Blood (collected 12-23-19 @ 22:20)  Source: .Blood None  Final Report (12-29-19 @ 04:00):    No growth at 5 days.    Culture - Urine (collected 12-23-19 @ 16:13)  Source: .Urine Clean Catch (Midstream)  Final Report (12-25-19 @ 07:13):    >=3 organisms. Probable collection contamination.    Culture - Blood (collected 11-27-19 @ 00:30)  Source: .Blood Blood-Peripheral  Final Report (12-02-19 @ 07:01):    No growth at 5 days.    Culture - Other (collected 10-12-19 @ 18:55)  Source: .Other L heel wound  Final Report (10-15-19 @ 18:14):    Few Escherichia coli    Few Enterococcus species "Susceptibilities not performed"    Rare Methicillin resistant Staphylococcus aureus  Organism: Escherichia coli  Methicillin resistant Staphylococcus aureus (10-15-19 @ 18:14)  Organism: Methicillin resistant Staphylococcus aureus (10-15-19 @ 18:14)      -  Ampicillin/Sulbactam: R <=8/4      -  Cefazolin: R 16      -  Clindamycin: R >4      -  Daptomycin: S 0.5      -  Erythromycin: R >4      -  Gentamicin: S <=1 Should not be used as monotherapy      -  Linezolid: S 2      -  Oxacillin: R >2      -  Penicillin: R 8      -  RIF- Rifampin: S <=1 Should not be used as monotherapy      -  Tetra/Doxy: S <=1      -  Trimethoprim/Sulfamethoxazole: S <=0.5/9.5      -  Vancomycin: S 2      Method Type: ALLISON  Organism: Escherichia coli (10-15-19 @ 18:14)      -  Amikacin: S <=16      -  Amoxicillin/Clavulanic Acid: I 16/8      -  Ampicillin: R >16 These ampicillin results predict results for amoxicillin      -  Ampicillin/Sulbactam: R >16/8 Enterobacter, Citrobacter, and Serratia may develop resistance during prolonged therapy (3-4 days)      -  Aztreonam: S <=4      -  Cefazolin: S 16 Enterobacter, Citrobacter, and Serratia may develop resistance during prolonged therapy (3-4 days)      -  Cefepime: S <=2      -  Cefoxitin: S <=8      -  Ceftriaxone: S <=1 Enterobacter, Citrobacter, and Serratia may develop resistance during prolonged therapy      -  Ciprofloxacin: R >2      -  Ertapenem: S <=0.5      -  Gentamicin: R >8      -  Imipenem: S <=1      -  Levofloxacin: R >4      -  Meropenem: S <=1      -  Piperacillin/Tazobactam: I 32      -  Tobramycin: I 8      -  Trimethoprim/Sulfamethoxazole: R >2/38      Method Type: ALLISON    Culture - Blood (collected 10-12-19 @ 05:41)  Source: .Blood None  Final Report (10-17-19 @ 18:00):    No growth at 5 days.    Culture - Blood (collected 10-11-19 @ 22:00)  Source: .Blood Blood-Peripheral  Final Report (10-17-19 @ 18:00):    No growth at 5 days.    Culture - Blood (collected 10-11-19 @ 22:00)  Source: .Blood Blood-Peripheral  Final Report (10-17-19 @ 18:00):    No growth at 5 days.    Culture - Blood (collected 09-11-19 @ 07:30)  Source: .Blood Blood  Final Report (09-16-19 @ 18:00):    No growth at 5 days.    Culture - Blood (collected 09-09-19 @ 22:00)  Source: .Blood Blood  Final Report (09-15-19 @ 19:00):    No growth at 5 days.    Culture - Blood (collected 09-09-19 @ 22:00)  Source: .Blood Blood  Final Report (09-15-19 @ 19:00):    No growth at 5 days.    Culture - Urine (collected 09-09-19 @ 20:34)  Source: .Urine Catheterized  Final Report (09-11-19 @ 20:49):    >100,000 CFU/ml Klebsiella oxytoca    >100,000 CFU/ml Kluyvera ascorbata  Organism: Klebsiella oxytoca  Kluyvera ascorbata (09-11-19 @ 20:49)  Organism: Kluyvera ascorbata (09-11-19 @ 20:49)      -  Amikacin: S <=16      -  Ampicillin: R >16 These ampicillin results predict results for amoxicillin      -  Ampicillin/Sulbactam: R >16/8 Enterobacter, Citrobacter, and Serratia may develop resistance during prolonged therapy (3-4 days)      -  Aztreonam: S <=4      -  Cefazolin: S 16      -  Cefepime: S <=4      -  Cefoxitin: S <=8      -  Ceftriaxone: S <=1 Enterobacter, Citrobacter, and Serratia may develop resistance during prolonged therapy      -  Ciprofloxacin: R >2      -  Ertapenem: S <=1      -  Gentamicin: R >8      -  Imipenem: S <=1      -  Levofloxacin: R >4      -  Meropenem: S <=1      -  Nitrofurantoin: S <=32 Should not be used to treat pyelonephritis      -  Piperacillin/Tazobactam: S <=16      -  Tigecycline: S <=2      -  Tobramycin: S <=4      -  Trimethoprim/Sulfamethoxazole: R >2/38      Method Type: ALLISON  Organism: Klebsiella oxytoca (09-11-19 @ 20:49)      -  Amikacin: S <=16      -  Ampicillin: R >16 These ampicillin results predict results for amoxicillin      -  Ampicillin/Sulbactam: S <=8/4 Enterobacter, Citrobacter, and Serratia may develop resistance during prolonged therapy (3-4 days)      -  Aztreonam: S <=4      -  Cefazolin: S <=8      -  Cefepime: S <=4      -  Cefoxitin: S <=8      -  Ceftriaxone: S <=1 Enterobacter, Citrobacter, and Serratia may develop resistance during prolonged therapy      -  Ciprofloxacin: R >2      -  Gentamicin: S <=4      -  Imipenem: S <=1      -  Levofloxacin: R >4      -  Meropenem: S <=1      -  Nitrofurantoin: S <=32 Should not be used to treat pyelonephritis      -  Piperacillin/Tazobactam: S <=16      -  Tigecycline: S <=2      -  Tobramycin: S <=4      -  Trimethoprim/Sulfamethoxazole: S <=2/38      Method Type: ALLISON    Culture - Urine (collected 07-04-19 @ 13:10)  Source: .Urine Catheterized  Final Report (07-05-19 @ 22:02):    <10,000 CFU/mL Normal Urogenital Lyubov    Culture - Blood (collected 06-05-19 @ 04:37)  Source: .Blood None  Final Report (06-10-19 @ 14:00):    No growth at 5 days.    Culture - Other (collected 06-04-19 @ 00:24)  Source: .Other right medial ankle culterette  Final Report (06-06-19 @ 17:38):    Numerous Escherichia coli  Organism: Escherichia coli (06-06-19 @ 17:38)  Organism: Escherichia coli (06-06-19 @ 17:38)      -  Amikacin: S <=16      -  Ampicillin: R >16 These ampicillin results predict results for amoxicillin      -  Ampicillin/Sulbactam: R >16/8      -  Aztreonam: S <=4      -  Cefazolin: I 16      -  Cefepime: S <=4      -  Cefoxitin: S <=8      -  Ceftriaxone: S <=1 Enterobacter, Citrobacter, and Serratia may develop resistance during prolonged therapy      -  Ciprofloxacin: R >2      -  Ertapenem: S <=1      -  Gentamicin: R >8      -  Imipenem: S <=1      -  Levofloxacin: R >4      -  Meropenem: S <=1      -  Piperacillin/Tazobactam: S <=16      -  Tobramycin: I 8      -  Trimethoprim/Sulfamethoxazole: R >2/38      Method Type: ALLISON    Culture - Blood (collected 06-03-19 @ 21:50)  Source: .Blood Blood  Final Report (06-09-19 @ 07:00):    No growth at 5 days.    Culture - Other (collected 06-03-19 @ 21:25)  Source: .Other None  Final Report (06-07-19 @ 12:21):    Numerous Escherichia coli    Few Escherichia coli #2    Few Corynebacterium species "Susceptibilities not performed"  Organism: Escherichia coli  Escherichia coli (06-07-19 @ 12:21)  Organism: Escherichia coli (06-07-19 @ 12:21)      -  Amikacin: S <=16      -  Ampicillin: R >16 These ampicillin results predict results for amoxicillin      -  Ampicillin/Sulbactam: R >16/8      -  Aztreonam: S <=4      -  Cefepime: S <=4      -  Cefoxitin: S <=8      -  Ceftriaxone: S <=1 Enterobacter, Citrobacter, and Serratia may develop resistance during prolonged therapy      -  Ciprofloxacin: R >2      -  Ertapenem: S <=1      -  Gentamicin: R >8      -  Imipenem: S <=1      -  Levofloxacin: R >4      -  Meropenem: S <=1      -  Piperacillin/Tazobactam: S <=16      -  Tobramycin: I 8      -  Trimethoprim/Sulfamethoxazole: R >2/38      Method Type: ALLISON  Organism: Escherichia coli (06-07-19 @ 12:21)      -  Amikacin: S <=16      -  Ampicillin: R >16 These ampicillin results predict results for amoxicillin      -  Ampicillin/Sulbactam: R >16/8      -  Aztreonam: S <=4      -  Cefazolin: I 16      -  Cefepime: S <=4      -  Cefoxitin: S <=8      -  Ceftriaxone: S <=1 Enterobacter, Citrobacter, and Serratia may develop resistance during prolonged therapy      -  Ciprofloxacin: R >2      -  Ertapenem: S <=1      -  Gentamicin: R >8      -  Imipenem: S <=1      -  Levofloxacin: R >4      -  Meropenem: S <=1      -  Piperacillin/Tazobactam: S <=16      -  Tobramycin: I 8      -  Trimethoprim/Sulfamethoxazole: R >2/38      Method Type: ALLISON    Culture - Blood (collected 06-03-19 @ 21:25)  Source: .Blood Blood  Final Report (06-09-19 @ 07:00):    No growth at 5 days.        Blood Gas Venous - Lactate: 1.0 mmoL/L (01-10-20 @ 11:56)      INFECTIOUS DISEASES TESTING  MRSA PCR Result.: Positive (12-02-19 @ 18:50)  Hepatitis C Virus Interpretation: Nonreact (07-06-19 @ 07:13)  Hepatitis B Surface Antigen: Nonreact (07-06-19 @ 07:13)  Hepatitis C Virus Interpretation: Nonreact (06-04-19 @ 05:01)      RADIOLOGY & ADDITIONAL TESTS:  I have personally reviewed the last Chest xray  CXR      CT      CARDIOLOGY TESTING  12 Lead ECG:   Ventricular Rate 97 BPM    Atrial Rate 97 BPM    P-R Interval 160 ms    QRS Duration 76 ms    Q-T Interval 354 ms    QTC Calculation(Bezet) 449 ms    P Axis 62 degrees    R Axis -17 degrees    T Axis 49 degrees    Diagnosis Line Normal sinus rhythm  Normal ECG    Confirmed by ELLIOT RIVERA MD ([id number]) on 1/10/2020 1:12:02 PM (01-10-20 @ 11:52)  12 Lead ECG:   Ventricular Rate 89 BPM    Atrial Rate 89 BPM    P-R Interval 166 ms    QRS Duration 80 ms    Q-T Interval 380 ms    QTC Calculation(Bezet) 462 ms    P Axis 32 degrees    R Axis -48 degrees    T Axis 75 degrees    Diagnosis Line Normal sinus rhythm  Left anterior fascicular block  Anterior infarct , age undetermined  Nonspecific T wave abnormality  Abnormal ECG    Confirmed by KOJO ANGULO MD ([id number]) on 1/6/2020 12:48:42 PM (01-06-20 @ 10:16)      MEDICATIONS  amitriptyline 25  calcium acetate 667  cefepime   IVPB 500  cefepime   IVPB   cephalexin 250  chlorhexidine 4% Liquid 1  cholecalciferol 2000  fluconAZOLE   Tablet 50  heparin  Injectable 5000  hydrocortisone 20  influenza   Vaccine 0.5  levothyroxine 100  methadone    Tablet 80  oxybutynin 10  oxyCODONE  ER Tablet 20  sevelamer carbonate 800  sodium bicarbonate 1300  vancomycin  IVPB 750      ANTIBIOTICS:  cefepime   IVPB 500 milliGRAM(s) IV Intermittent every 24 hours  cefepime   IVPB      cephalexin 250 milliGRAM(s) Oral every 8 hours  fluconAZOLE   Tablet 50 milliGRAM(s) Oral <User Schedule>  vancomycin  IVPB 750 milliGRAM(s) IV Intermittent every 24 hours      ALLERGIES:  sulfamethizole (Unknown) MARGE BELLA  59y, Male  Allergy: sulfamethizole (Unknown)      CHIEF COMPLAINT: Elevated creatinine and hypoglycemia (13 Jan 2020 15:52)      HPI:  59 years old male known to have PVD s/p right side AKA and left below knee amputation, paraplegia with multiple chronic bilateral buttock and sacral stage 4 ulcers, suprapubic catheter, HTN (not on any medication), CKD stage IV (not following with nephrology), hypothyroidism, opioid dependance, recent admission for cold left foot (chronic left foot dry gangrene, h/o esophageal candidiasis was sent to the rehab due to worsening kidney functions and hypoglycemia with FS 45.    pt himself denies any chest pain, palpitations, shortness of breath, headache, lightheadedness, vision change, abdominal pain, nausea, vomiting, diarrhea, constipation, foul small urine from suprapubic cath.    Pt only states he had cloudy urine for past few days. states even if needed he will not go for hemodialysis. (10 Toni 2020 21:52)      PAST MEDICAL & SURGICAL HISTORY:  Anemia  PAD (peripheral artery disease)  Hypertension  Suprapubic catheter  Pressure ulcer  Diabetes  Lumbar compression fracture  S/P below knee amputation, left  S/P below knee amputation, right  S/P debridement  Toe amputation status, right  H/O hernia repair      FAMILY HISTORY  FH: atrial fibrillation  No pertinent family history in first degree relatives  No pertinent family history in first degree relatives      SOCIAL HISTORY    Substance Use (  ) never used  (  ) IVDU (  ) Other:  Tobacco Usage:  (   ) never smoked   (   ) former smoker   (   ) current smoker   Alcohol Usage: (   ) social  (   ) daily use (   ) denies  Sexual History:       ROS  General: Denies rigors, nightsweats  HEENT: Denies headache, rhinorrhea, sore throat, eye pain  CV: Denies CP, palpitations  PULM: Denies wheezing, hemoptysis  GI: Denies hematemesis, hematochezia, melena  : Denies discharge, hematuria  MSK: Denies arthralgias, myalgias  SKIN: Denies rash, lesions  NEURO: Denies paresthesias, weakness  PSYCH: Denies depression, anxiety    VITALS:  T(F): 98.2, Max: 98.2 (01-14-20 @ 00:14)  HR: 87  BP: 109/68  RR: 18Vital Signs Last 24 Hrs  T(C): 36.8 (14 Jan 2020 00:14), Max: 36.8 (14 Jan 2020 00:14)  T(F): 98.2 (14 Jan 2020 00:14), Max: 98.2 (14 Jan 2020 00:14)  HR: 87 (14 Jan 2020 00:14) (87 - 101)  BP: 109/68 (14 Jan 2020 00:14) (90/59 - 109/68)  BP(mean): --  RR: 18 (14 Jan 2020 00:14) (18 - 18)  SpO2: 98% (14 Jan 2020 00:14) (98% - 98%)    PHYSICAL EXAM:  Gen: chronically ill appearing NAD, resting in bed  HEENT: Normocephalic, atraumatic  Neck: supple, no lymphadenopathy  CV: Regular rate & regular rhythm  Lungs: decreased BS at bases, no fremitus  Abdomen: Soft, BS present  Ext: Warm, well perfused, b/l AKA  Neuro: non focal, awake  Skin: sacral decubitus necrotic tissue, foul smelling, L ischial decub clean   Lines: no phlebitis    TESTS & MEASUREMENTS:                        8.3    13.21 )-----------( 335      ( 13 Jan 2020 11:29 )             25.9     01-13    135  |  108  |  59<H>  ----------------------------<  68<L>  5.3<H>   |  13<L>  |  4.1<HH>    Ca    7.3<L>      13 Jan 2020 11:29    TPro  4.1<L>  /  Alb  1.4<L>  /  TBili  0.2  /  DBili  x   /  AST  11  /  ALT  10  /  AlkPhos  181<H>  01-12    eGFR if Non African American: 15 mL/min/1.73M2 (01-13-20 @ 11:29)  eGFR if African American: 18 mL/min/1.73M2 (01-13-20 @ 11:29)    LIVER FUNCTIONS - ( 12 Jan 2020 11:09 )  Alb: 1.4 g/dL / Pro: 4.1 g/dL / ALK PHOS: 181 U/L / ALT: 10 U/L / AST: 11 U/L / GGT: x               Culture - Blood (collected 12-23-19 @ 22:20)  Source: .Blood None  Final Report (12-29-19 @ 04:00):    No growth at 5 days.    Culture - Urine (collected 12-23-19 @ 16:13)  Source: .Urine Clean Catch (Midstream)  Final Report (12-25-19 @ 07:13):    >=3 organisms. Probable collection contamination.    Culture - Blood (collected 11-27-19 @ 00:30)  Source: .Blood Blood-Peripheral  Final Report (12-02-19 @ 07:01):    No growth at 5 days.    Culture - Other (collected 10-12-19 @ 18:55)  Source: .Other L heel wound  Final Report (10-15-19 @ 18:14):    Few Escherichia coli    Few Enterococcus species "Susceptibilities not performed"    Rare Methicillin resistant Staphylococcus aureus  Organism: Escherichia coli  Methicillin resistant Staphylococcus aureus (10-15-19 @ 18:14)  Organism: Methicillin resistant Staphylococcus aureus (10-15-19 @ 18:14)      -  Ampicillin/Sulbactam: R <=8/4      -  Cefazolin: R 16      -  Clindamycin: R >4      -  Daptomycin: S 0.5      -  Erythromycin: R >4      -  Gentamicin: S <=1 Should not be used as monotherapy      -  Linezolid: S 2      -  Oxacillin: R >2      -  Penicillin: R 8      -  RIF- Rifampin: S <=1 Should not be used as monotherapy      -  Tetra/Doxy: S <=1      -  Trimethoprim/Sulfamethoxazole: S <=0.5/9.5      -  Vancomycin: S 2      Method Type: ALLISON  Organism: Escherichia coli (10-15-19 @ 18:14)      -  Amikacin: S <=16      -  Amoxicillin/Clavulanic Acid: I 16/8      -  Ampicillin: R >16 These ampicillin results predict results for amoxicillin      -  Ampicillin/Sulbactam: R >16/8 Enterobacter, Citrobacter, and Serratia may develop resistance during prolonged therapy (3-4 days)      -  Aztreonam: S <=4      -  Cefazolin: S 16 Enterobacter, Citrobacter, and Serratia may develop resistance during prolonged therapy (3-4 days)      -  Cefepime: S <=2      -  Cefoxitin: S <=8      -  Ceftriaxone: S <=1 Enterobacter, Citrobacter, and Serratia may develop resistance during prolonged therapy      -  Ciprofloxacin: R >2      -  Ertapenem: S <=0.5      -  Gentamicin: R >8      -  Imipenem: S <=1      -  Levofloxacin: R >4      -  Meropenem: S <=1      -  Piperacillin/Tazobactam: I 32      -  Tobramycin: I 8      -  Trimethoprim/Sulfamethoxazole: R >2/38      Method Type: ALLISON    Culture - Blood (collected 10-12-19 @ 05:41)  Source: .Blood None  Final Report (10-17-19 @ 18:00):    No growth at 5 days.    Culture - Blood (collected 10-11-19 @ 22:00)  Source: .Blood Blood-Peripheral  Final Report (10-17-19 @ 18:00):    No growth at 5 days.    Culture - Blood (collected 10-11-19 @ 22:00)  Source: .Blood Blood-Peripheral  Final Report (10-17-19 @ 18:00):    No growth at 5 days.    Culture - Blood (collected 09-11-19 @ 07:30)  Source: .Blood Blood  Final Report (09-16-19 @ 18:00):    No growth at 5 days.    Culture - Blood (collected 09-09-19 @ 22:00)  Source: .Blood Blood  Final Report (09-15-19 @ 19:00):    No growth at 5 days.    Culture - Blood (collected 09-09-19 @ 22:00)  Source: .Blood Blood  Final Report (09-15-19 @ 19:00):    No growth at 5 days.    Culture - Urine (collected 09-09-19 @ 20:34)  Source: .Urine Catheterized  Final Report (09-11-19 @ 20:49):    >100,000 CFU/ml Klebsiella oxytoca    >100,000 CFU/ml Kluyvera ascorbata  Organism: Klebsiella oxytoca  Kluyvera ascorbata (09-11-19 @ 20:49)  Organism: Kluyvera ascorbata (09-11-19 @ 20:49)      -  Amikacin: S <=16      -  Ampicillin: R >16 These ampicillin results predict results for amoxicillin      -  Ampicillin/Sulbactam: R >16/8 Enterobacter, Citrobacter, and Serratia may develop resistance during prolonged therapy (3-4 days)      -  Aztreonam: S <=4      -  Cefazolin: S 16      -  Cefepime: S <=4      -  Cefoxitin: S <=8      -  Ceftriaxone: S <=1 Enterobacter, Citrobacter, and Serratia may develop resistance during prolonged therapy      -  Ciprofloxacin: R >2      -  Ertapenem: S <=1      -  Gentamicin: R >8      -  Imipenem: S <=1      -  Levofloxacin: R >4      -  Meropenem: S <=1      -  Nitrofurantoin: S <=32 Should not be used to treat pyelonephritis      -  Piperacillin/Tazobactam: S <=16      -  Tigecycline: S <=2      -  Tobramycin: S <=4      -  Trimethoprim/Sulfamethoxazole: R >2/38      Method Type: ALLISON  Organism: Klebsiella oxytoca (09-11-19 @ 20:49)      -  Amikacin: S <=16      -  Ampicillin: R >16 These ampicillin results predict results for amoxicillin      -  Ampicillin/Sulbactam: S <=8/4 Enterobacter, Citrobacter, and Serratia may develop resistance during prolonged therapy (3-4 days)      -  Aztreonam: S <=4      -  Cefazolin: S <=8      -  Cefepime: S <=4      -  Cefoxitin: S <=8      -  Ceftriaxone: S <=1 Enterobacter, Citrobacter, and Serratia may develop resistance during prolonged therapy      -  Ciprofloxacin: R >2      -  Gentamicin: S <=4      -  Imipenem: S <=1      -  Levofloxacin: R >4      -  Meropenem: S <=1      -  Nitrofurantoin: S <=32 Should not be used to treat pyelonephritis      -  Piperacillin/Tazobactam: S <=16      -  Tigecycline: S <=2      -  Tobramycin: S <=4      -  Trimethoprim/Sulfamethoxazole: S <=2/38      Method Type: ALLISON    Culture - Urine (collected 07-04-19 @ 13:10)  Source: .Urine Catheterized  Final Report (07-05-19 @ 22:02):    <10,000 CFU/mL Normal Urogenital Lyubov    Culture - Blood (collected 06-05-19 @ 04:37)  Source: .Blood None  Final Report (06-10-19 @ 14:00):    No growth at 5 days.    Culture - Other (collected 06-04-19 @ 00:24)  Source: .Other right medial ankle culterette  Final Report (06-06-19 @ 17:38):    Numerous Escherichia coli  Organism: Escherichia coli (06-06-19 @ 17:38)  Organism: Escherichia coli (06-06-19 @ 17:38)      -  Amikacin: S <=16      -  Ampicillin: R >16 These ampicillin results predict results for amoxicillin      -  Ampicillin/Sulbactam: R >16/8      -  Aztreonam: S <=4      -  Cefazolin: I 16      -  Cefepime: S <=4      -  Cefoxitin: S <=8      -  Ceftriaxone: S <=1 Enterobacter, Citrobacter, and Serratia may develop resistance during prolonged therapy      -  Ciprofloxacin: R >2      -  Ertapenem: S <=1      -  Gentamicin: R >8      -  Imipenem: S <=1      -  Levofloxacin: R >4      -  Meropenem: S <=1      -  Piperacillin/Tazobactam: S <=16      -  Tobramycin: I 8      -  Trimethoprim/Sulfamethoxazole: R >2/38      Method Type: ALLISON    Culture - Blood (collected 06-03-19 @ 21:50)  Source: .Blood Blood  Final Report (06-09-19 @ 07:00):    No growth at 5 days.    Culture - Other (collected 06-03-19 @ 21:25)  Source: .Other None  Final Report (06-07-19 @ 12:21):    Numerous Escherichia coli    Few Escherichia coli #2    Few Corynebacterium species "Susceptibilities not performed"  Organism: Escherichia coli  Escherichia coli (06-07-19 @ 12:21)  Organism: Escherichia coli (06-07-19 @ 12:21)      -  Amikacin: S <=16      -  Ampicillin: R >16 These ampicillin results predict results for amoxicillin      -  Ampicillin/Sulbactam: R >16/8      -  Aztreonam: S <=4      -  Cefepime: S <=4      -  Cefoxitin: S <=8      -  Ceftriaxone: S <=1 Enterobacter, Citrobacter, and Serratia may develop resistance during prolonged therapy      -  Ciprofloxacin: R >2      -  Ertapenem: S <=1      -  Gentamicin: R >8      -  Imipenem: S <=1      -  Levofloxacin: R >4      -  Meropenem: S <=1      -  Piperacillin/Tazobactam: S <=16      -  Tobramycin: I 8      -  Trimethoprim/Sulfamethoxazole: R >2/38      Method Type: ALLISON  Organism: Escherichia coli (06-07-19 @ 12:21)      -  Amikacin: S <=16      -  Ampicillin: R >16 These ampicillin results predict results for amoxicillin      -  Ampicillin/Sulbactam: R >16/8      -  Aztreonam: S <=4      -  Cefazolin: I 16      -  Cefepime: S <=4      -  Cefoxitin: S <=8      -  Ceftriaxone: S <=1 Enterobacter, Citrobacter, and Serratia may develop resistance during prolonged therapy      -  Ciprofloxacin: R >2      -  Ertapenem: S <=1      -  Gentamicin: R >8      -  Imipenem: S <=1      -  Levofloxacin: R >4      -  Meropenem: S <=1      -  Piperacillin/Tazobactam: S <=16      -  Tobramycin: I 8      -  Trimethoprim/Sulfamethoxazole: R >2/38      Method Type: ALLISON    Culture - Blood (collected 06-03-19 @ 21:25)  Source: .Blood Blood  Final Report (06-09-19 @ 07:00):    No growth at 5 days.        Blood Gas Venous - Lactate: 1.0 mmoL/L (01-10-20 @ 11:56)      INFECTIOUS DISEASES TESTING  MRSA PCR Result.: Positive (12-02-19 @ 18:50)  Hepatitis C Virus Interpretation: Nonreact (07-06-19 @ 07:13)  Hepatitis B Surface Antigen: Nonreact (07-06-19 @ 07:13)  Hepatitis C Virus Interpretation: Nonreact (06-04-19 @ 05:01)      RADIOLOGY & ADDITIONAL TESTS:  I have personally reviewed the last Chest xray  CXR      CT      CARDIOLOGY TESTING  12 Lead ECG:   Ventricular Rate 97 BPM    Atrial Rate 97 BPM    P-R Interval 160 ms    QRS Duration 76 ms    Q-T Interval 354 ms    QTC Calculation(Bezet) 449 ms    P Axis 62 degrees    R Axis -17 degrees    T Axis 49 degrees    Diagnosis Line Normal sinus rhythm  Normal ECG    Confirmed by ELLIOT RIVERA MD ([id number]) on 1/10/2020 1:12:02 PM (01-10-20 @ 11:52)  12 Lead ECG:   Ventricular Rate 89 BPM    Atrial Rate 89 BPM    P-R Interval 166 ms    QRS Duration 80 ms    Q-T Interval 380 ms    QTC Calculation(Bezet) 462 ms    P Axis 32 degrees    R Axis -48 degrees    T Axis 75 degrees    Diagnosis Line Normal sinus rhythm  Left anterior fascicular block  Anterior infarct , age undetermined  Nonspecific T wave abnormality  Abnormal ECG    Confirmed by KOJO ANGULO MD ([id number]) on 1/6/2020 12:48:42 PM (01-06-20 @ 10:16)      MEDICATIONS  amitriptyline 25  calcium acetate 667  cefepime   IVPB 500  cefepime   IVPB   cephalexin 250  chlorhexidine 4% Liquid 1  cholecalciferol 2000  fluconAZOLE   Tablet 50  heparin  Injectable 5000  hydrocortisone 20  influenza   Vaccine 0.5  levothyroxine 100  methadone    Tablet 80  oxybutynin 10  oxyCODONE  ER Tablet 20  sevelamer carbonate 800  sodium bicarbonate 1300  vancomycin  IVPB 750      ANTIBIOTICS:  cefepime   IVPB 500 milliGRAM(s) IV Intermittent every 24 hours  cefepime   IVPB      cephalexin 250 milliGRAM(s) Oral every 8 hours  fluconAZOLE   Tablet 50 milliGRAM(s) Oral <User Schedule>  vancomycin  IVPB 750 milliGRAM(s) IV Intermittent every 24 hours      ALLERGIES:  sulfamethizole (Unknown)

## 2020-01-14 NOTE — PROGRESS NOTE ADULT - SUBJECTIVE AND OBJECTIVE BOX
MARGE BELLA  59y  Male      Patient is a 59y old  Male who presents with a chief complaint of Elevated creatinine and hypoglycemia (13 Jan 2020 15:52)      INTERVAL HPI/OVERNIGHT EVENTS:  He feels ok, no acute complaints, he wants to go home  Vital Signs Last 24 Hrs  T(C): 36.6 (14 Jan 2020 08:43), Max: 36.8 (14 Jan 2020 00:14)  T(F): 97.9 (14 Jan 2020 08:43), Max: 98.2 (14 Jan 2020 00:14)  HR: 90 (14 Jan 2020 08:43) (87 - 101)  BP: 120/58 (14 Jan 2020 08:43) (90/59 - 120/58)  BP(mean): --  RR: 18 (14 Jan 2020 08:43) (18 - 18)  SpO2: 99% (14 Jan 2020 08:43) (98% - 99%)            Consultant(s) Notes Reviewed:  [x ] YES  [ ] NO          MEDICATIONS  (STANDING):  amitriptyline 25 milliGRAM(s) Oral at bedtime  calcium acetate 667 milliGRAM(s) Oral three times a day with meals  cefepime   IVPB 500 milliGRAM(s) IV Intermittent every 24 hours  cefepime   IVPB      cephalexin 250 milliGRAM(s) Oral every 8 hours  chlorhexidine 4% Liquid 1 Application(s) Topical <User Schedule>  cholecalciferol 2000 Unit(s) Oral daily  fluconAZOLE   Tablet 50 milliGRAM(s) Oral <User Schedule>  heparin  Injectable 5000 Unit(s) SubCutaneous every 12 hours  hydrocortisone 20 milliGRAM(s) Oral daily  influenza   Vaccine 0.5 milliLiter(s) IntraMuscular once  levothyroxine 100 MICROGram(s) Oral daily  methadone    Tablet 80 milliGRAM(s) Oral every 8 hours  oxybutynin 10 milliGRAM(s) Oral two times a day  oxyCODONE  ER Tablet 20 milliGRAM(s) Oral every 12 hours  sevelamer carbonate 800 milliGRAM(s) Oral three times a day with meals  sodium bicarbonate 1300 milliGRAM(s) Oral three times a day  vancomycin  IVPB 750 milliGRAM(s) IV Intermittent every 24 hours    MEDICATIONS  (PRN):  diazepam    Tablet 5 milliGRAM(s) Oral two times a day PRN anxiety  oxyCODONE    IR 30 milliGRAM(s) Oral every 6 hours PRN Severe Pain (7 - 10)      LABS                          8.1    11.03 )-----------( 309      ( 14 Jan 2020 07:27 )             25.6     01-14    138  |  112<H>  |  62<HH>  ----------------------------<  73  4.5   |  14<L>  |  4.3<HH>    Ca    7.5<L>      14 Jan 2020 07:27  Mg     1.6     01-14            Lactate Trend        CAPILLARY BLOOD GLUCOSE      POCT Blood Glucose.: 71 mg/dL (14 Jan 2020 09:01)        RADIOLOGY & ADDITIONAL TESTS:    Imaging Personally Reviewed:  [ ] YES  [ ] NO    HEALTH ISSUES - PROBLEM Dx:  Adjustment disorder        PHYSICAL EXAM:  GENERAL: NAD, well-developed  HEAD:  Atraumatic, Normocephalic  EYES: EOMI, PERRLA, conjunctiva and sclera clear  NECK: Supple, No JVD  CHEST/LUNG: Clear to auscultation bilaterally; No wheeze  HEART: Regular rate and rhythm; S1 S2  ABDOMEN: Soft, Nontender, Nondistended; Bowel sounds present  EXTREMITIES:  Right AKA, left BKA.   PSYCH: AAOx3  NEUROLOGY: non-focal  SKIN: sacral ulcer stage 4 MARGE BELLA  59y  Male      Patient is a 59y old  Male who presents with a chief complaint of Elevated creatinine and hypoglycemia (13 Jan 2020 15:52)      INTERVAL HPI/OVERNIGHT EVENTS:  He feels ok, no acute complaints, he wants to go home  Vital Signs Last 24 Hrs  T(C): 36.6 (14 Jan 2020 08:43), Max: 36.8 (14 Jan 2020 00:14)  T(F): 97.9 (14 Jan 2020 08:43), Max: 98.2 (14 Jan 2020 00:14)  HR: 90 (14 Jan 2020 08:43) (87 - 101)  BP: 120/58 (14 Jan 2020 08:43) (90/59 - 120/58)  BP(mean): --  RR: 18 (14 Jan 2020 08:43) (18 - 18)  SpO2: 99% (14 Jan 2020 08:43) (98% - 99%)            Consultant(s) Notes Reviewed:  [x ] YES  [ ] NO          MEDICATIONS  (STANDING):  amitriptyline 25 milliGRAM(s) Oral at bedtime  calcium acetate 667 milliGRAM(s) Oral three times a day with meals  cefepime   IVPB 500 milliGRAM(s) IV Intermittent every 24 hours  cefepime   IVPB      cephalexin 250 milliGRAM(s) Oral every 8 hours  chlorhexidine 4% Liquid 1 Application(s) Topical <User Schedule>  cholecalciferol 2000 Unit(s) Oral daily  fluconAZOLE   Tablet 50 milliGRAM(s) Oral <User Schedule>  heparin  Injectable 5000 Unit(s) SubCutaneous every 12 hours  hydrocortisone 20 milliGRAM(s) Oral daily  influenza   Vaccine 0.5 milliLiter(s) IntraMuscular once  levothyroxine 100 MICROGram(s) Oral daily  methadone    Tablet 80 milliGRAM(s) Oral every 8 hours  oxybutynin 10 milliGRAM(s) Oral two times a day  oxyCODONE  ER Tablet 20 milliGRAM(s) Oral every 12 hours  sevelamer carbonate 800 milliGRAM(s) Oral three times a day with meals  sodium bicarbonate 1300 milliGRAM(s) Oral three times a day  vancomycin  IVPB 750 milliGRAM(s) IV Intermittent every 24 hours    MEDICATIONS  (PRN):  diazepam    Tablet 5 milliGRAM(s) Oral two times a day PRN anxiety  oxyCODONE    IR 30 milliGRAM(s) Oral every 6 hours PRN Severe Pain (7 - 10)      LABS                          8.1    11.03 )-----------( 309      ( 14 Jan 2020 07:27 )             25.6     01-14    138  |  112<H>  |  62<HH>  ----------------------------<  73  4.5   |  14<L>  |  4.3<HH>    Ca    7.5<L>      14 Jan 2020 07:27  Mg     1.6     01-14            Lactate Trend        CAPILLARY BLOOD GLUCOSE      POCT Blood Glucose.: 71 mg/dL (14 Jan 2020 09:01)        RADIOLOGY & ADDITIONAL TESTS:    Imaging Personally Reviewed:  [ ] YES  [ ] NO    HEALTH ISSUES - PROBLEM Dx:  Adjustment disorder        PHYSICAL EXAM:  GENERAL: NAD, well-developed  HEAD:  Atraumatic, Normocephalic  EYES: EOMI, PERRLA, conjunctiva and sclera clear  NECK: Supple, No JVD  CHEST/LUNG: Clear to auscultation bilaterally; No wheeze  HEART: Regular rate and rhythm; S1 S2  ABDOMEN: Soft, Nontender, Nondistended; Bowel sounds present  EXTREMITIES:  bilateral BKA.   PSYCH: AAOx3  NEUROLOGY: non-focal  SKIN: sacral ulcer stage 4

## 2020-01-14 NOTE — CONSULT NOTE ADULT - SUBJECTIVE AND OBJECTIVE BOX
pt well known to burn service --> sacral and butttock pressure sore    PE: left buttock --> healed    right buttock and sacrum--> granulating ulcers--> no infection

## 2020-01-14 NOTE — PROGRESS NOTE ADULT - ASSESSMENT
59 years old male known to have PVD s/p right side AKA and left below knee amputation, paraplegia with multiple chronic bilateral buttock and sacral stage 4 ulcers, suprapubic catheter, HTN (not on any medication), CKD stage IV (not following with nephrology), hypothyroidism, opioid dependance, recent admission for cold left foot (chronic left foot dry gangrene, h/o esophageal candidiasis was sent to the rehab due to worsening kidney functions and hypoglycemia with FS 45.    In the ED, pt remained hemodynamically stable but looks very pale, no active bleeding, lab workup showed leukocytosis of 19, normo anemia with Hb 7.7 (vbg 8.7), potassium 6.5 (hemolyzed) repeat 5.2, anion gap metabolic acidosis, worsening CKD 4 with creat 4.1 (baseline 3.5), , cxr ok.    # CKD 4:  - Creatinine stable around 4.1 , no indication for RRT  # Hyperkalemia 6.5 > 5.2-->5.3, stable around 5.3   # Metabolic acidosis: Non anion gap metabolic acidosis, bicarb 13, non worsening.  - increased sodium bicarb to 1300 q 8    # Leukocytosis - WBC 13,000  - Patient has sacral ulcers that look infected  - F/up Burn consult ( called them, will see patient either tonight or tomorrow morning)  - C/w vanc and cefepime. 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)  - ID consult: Ceftriaxone 1g qd IV, Doxycycline 100 mg q12h IV and flagyl 500 mg IV q8h    # Behavioral abnormalities  Psych eval noted: patient deemed not suicidal and can make his own decisions  - He signed AMA on saturday but did not leave    # Acute normo anemia  Pt very pale on examination  no active bleed  keep active type and screen  transfuse to keep Hb > 7  Iron studies from Nov 2019 shows anemia of chronic disease + iron def  - Will need LINDY but not venofer    #  PVD s/p right side AKA and left below knee amputation  incision sites look clean    # hypothyroidism  - c/w levothyroxine 100. F/up TSH. If still elevated, will increase to 125 mcg    # h/o esophageal candidiasis  cw fluconazole  outpt GI fu    # opioid dependance  cw methadone, f/u qtc  outpt clinic fu on discharge    # HTN (not on any medication)    # suspected folate / vit D / Vit b12 DEF  Fu level and start if needed    # Pt looks malnourished  - Dietician qiana noted    DVT PPX: heparin  GI PPx: not indicated  Dispo: Requesting home services, will anticipate for tomorrow  Diet: dash  Activity: OOBTC

## 2020-01-15 NOTE — CHART NOTE - NSCHARTNOTEFT_GEN_A_CORE
Registered Dietitian Follow-Up     Patient Profile Reviewed                           Yes [x]   No []     Nutrition History Previously Obtained        Yes []  No [x]  Pt more willing to speak to RD today, however, did not obtain much information as pt often delineates from conversation and goes on many tangents. Not answering questions regarding nutrition history appropriately.      Pertinent Subjective Information:  -Pt sitting comfortably in bed at time of assessment. Pt very stubborn when comes to hospital food and has many food preferences. If preferences not provided then pt refuses to eat. Kitchen staff aware of preferences but also state pt changes mind when meals are delivered. Encouraged pt to try to work with kitchen staff more effectively. Pt denied all offers of nutrition supplements at this time despite being aware of increased nutritional need for wound healing. Recs at end of document d/w LIP      Pertinent Medical Interventions:  1. CKD IV, no indication for RRT at this time  --hyperkalemia improved   2. Metabolic acidosis  --improving with sodium bicarb   3. Leukocytosis  --suspect infected sacral ulcers   --ID following   --per burn: no surgery needed. recommend soap and water qd, santyl ointment and dakin solution wet to dry dressing , ABD pad dressing change qd.  4. Acute normo anemia, no active bleed  Behavioral abnormalities & opioid dependance  --pt signed AMA on Saturday but has not left    Diet order: CHO Consistent, DASH/TLC, No concentrated K+      Anthropometrics:  - Ht. 185.42cm as per EMR hx   - Wt. 60.1kg (1/11) no new wt   - %wt change  - BMI 19.6--adjusted for b/l BKAs   - IBW     Pertinent Lab Data: (1/15/20) WBC 27.35, RBC 2.31, H/H 6.7/21.0, BUN 59, Cr 4.4, glucose 50, Ca 7.6, GFR 14, Mg 1.4      Pertinent Meds: heparin, abx, IV Mg Sulfate, oxy, sodium bicarbonate, methadone, oxy, phoslo, cholecalciferol, synthroid      Physical Findings:  - Appearance: AAO   - GI function: fecal incontinence noted with LBM 1/13   - Tubes:  - Oral/Mouth cavity: none reported   - Skin: no edema. b/l BKAs. Stage IV pressure ulcer to sacrum and R buttocks. stage II pressure ulcer to R IT      Nutrition Requirements  Weight Used: 60.1kg (continued from RD initial assessment)      estimated calorie needs: 1803 - 2103 kcals/day (30-35 kcals/kg for multiple PU)  estimated protein needs: 54 - 66 gms/day (0.9 - 1.1 gm/kg for PU and CKD4 not on HD)  estimated fluid needs: 1ml/kcal or per LIP      Nutrient Intake: varied intake based on meal options. typically consumes 50% meal at best      [x] Previous Nutrition Diagnosis: increased nutrient needs             [x] Ongoing          [] Resolved     Nutrition Intervention: meals and snacks, vitamins/minerals   Recommend:  1. If pt remains hypoglycemic or finger sticks <180mg/dL, consider d/c CHO Consistent diet order. Add Renal diet modifier given recent hyperkalemia and hyperphosphatemia with CKD IV.  2. Consider adding daily multivitamin w.out minerals to promote wound healing.      Goal/Expected Outcome: Pt to ideally tolerate >75% meals upon f/u in 3 days.      Indicator/Monitoring: RD to monitor energy intake, diet order, body comp, NFPF, renal profile.

## 2020-01-15 NOTE — PROGRESS NOTE ADULT - ASSESSMENT
ASSESSMENT  60 yo M PVD s/p right side AKA and left below knee amputation, paraplegia with multiple chronic bilateral buttock and sacral stage 4 ulcers, suprapubic catheter, HTN (not on any medication), CKD stage IV (not following with nephrology), hypothyroidism, opioid dependance, recent admission for cold left foot (chronic left foot dry gangrene, h/o esophageal candidiasis was sent to the rehab due to worsening kidney functions and hypoglycemia     IMPRESSION  #Sacral ulcer, infected, febrile 1/14 T101    Sepsis on admission P>90 WBC 18    10/2019 wcx heel MRSA, ecoli (R fluoro, unasyn, gent, bactrim, I zosyn), enterococcus  #CKD   #Malnutrition BMI (kg/m2): 17.5  #Abx allergy: sulfamethizole (Unknown)    RECOMMENDATIONS  - SEND BCX as febrile overnight  - Burn consult appreciated- no debridement  - Doxy 100mg q12h IV and flagyl 500mg q8h IV   - Ceftriaxone 1g q24h IV , if spikes again, broaden to cefepime 1g q24h IV  - No role for prolonged IV therapy in chronic OM. Continue offloading and nutritional support  - On fluc for esophagitis, course per GI   - Trend WBC, Cr    Spectra [id number]

## 2020-01-15 NOTE — PROGRESS NOTE ADULT - SUBJECTIVE AND OBJECTIVE BOX
SUBJECTIVE:    Patient is a 59y old Male who presents with a chief complaint of elevated creatinine, hypoglycemia (14 Jan 2020 15:50)    Currently admitted to medicine with the primary diagnosis of CKD (chronic kidney disease), stage IV     Today is hospital day 5d. This morning he is resting comfortably in bed and reports no new issues or overnight events.   This morning the patient was complaining of pain in his knees, he was pale and tired. Blood pressure was in the 60s systolic.  I gave him 500 cc NS bolus and his BP perked up to 110 systolic. However, he dropped again    PAST MEDICAL & SURGICAL HISTORY  Anemia  PAD (peripheral artery disease)  Hypertension  Suprapubic catheter  Pressure ulcer  Diabetes  Lumbar compression fracture  S/P below knee amputation, left  S/P below knee amputation, right  S/P debridement  Toe amputation status, right  H/O hernia repair    SOCIAL HISTORY:  Negative for smoking/alcohol/drug use.     ALLERGIES:  sulfamethizole (Unknown)    MEDICATIONS:  STANDING MEDICATIONS  amitriptyline 25 milliGRAM(s) Oral at bedtime  calcium acetate 667 milliGRAM(s) Oral three times a day with meals  cefepime   IVPB 500 milliGRAM(s) IV Intermittent daily  chlorhexidine 4% Liquid 1 Application(s) Topical <User Schedule>  cholecalciferol 2000 Unit(s) Oral daily  collagenase Ointment 1 Application(s) Topical two times a day  Dakins Solution - 1/2 Strength 1 Application(s) Topical two times a day  fluconAZOLE   Tablet 50 milliGRAM(s) Oral <User Schedule>  heparin  Injectable 5000 Unit(s) SubCutaneous every 12 hours  hydrocortisone 20 milliGRAM(s) Oral daily  influenza   Vaccine 0.5 milliLiter(s) IntraMuscular once  levothyroxine 100 MICROGram(s) Oral daily  magnesium sulfate  IVPB 2 Gram(s) IV Intermittent once  methadone    Tablet 80 milliGRAM(s) Oral every 8 hours  metroNIDAZOLE  IVPB 500 milliGRAM(s) IV Intermittent every 8 hours  midodrine. 2.5 milliGRAM(s) Oral three times a day  oxybutynin 10 milliGRAM(s) Oral two times a day  oxyCODONE  ER Tablet 20 milliGRAM(s) Oral every 12 hours  sevelamer carbonate 800 milliGRAM(s) Oral three times a day with meals  sodium bicarbonate 1300 milliGRAM(s) Oral three times a day  vancomycin  IVPB 750 milliGRAM(s) IV Intermittent daily    PRN MEDICATIONS  acetaminophen   Tablet .. 650 milliGRAM(s) Oral every 6 hours PRN  diazepam    Tablet 5 milliGRAM(s) Oral two times a day PRN  oxyCODONE    IR 30 milliGRAM(s) Oral every 4 hours PRN    VITALS:   T(F): 97.1  HR: 88  BP: 103/57  RR: 18  SpO2: 96%    LABS:                        7.8    18.04 )-----------( 287      ( 15 Toni 2020 15:45 )             24.3     01-15    138  |  109  |  59<H>  ----------------------------<  50<L>  4.8   |  13<L>  |  4.4<HH>    Ca    7.6<L>      15 Toni 2020 07:44  Mg     1.4     01-15    PHYSICAL EXAM:  GEN: No acute distress, pale, NC/AT  LUNGS: Clear to auscultation bilaterally, no wheezes   HEART: S1/S2 present. RRR. No murmurs  ABD: Soft, non-tender, non-distended. Bowel sounds present  EXT: B/l BKA. Sacral ulcers and lower back ulcer and buttock  NEURO: AAOX3    Intravenous access: No IV access  NG tube: None  Posadas Catheter: None

## 2020-01-15 NOTE — PROGRESS NOTE ADULT - SUBJECTIVE AND OBJECTIVE BOX
MARGE BELLA  59y, Male  Allergy: sulfamethizole (Unknown)      CHIEF COMPLAINT: elevated creatinine, hypoglycemia (14 Jan 2020 15:50)      INTERVAL EVENTS/HPI  - Hypotensive this AM, febrile overnight  - T(F): , Max: 101 (01-14-20 @ 23:20)  - Denies any worsening symptoms  - WBC Count: 11.03 (01-14-20 @ 07:27)  WBC Count: 13.21 (01-13-20 @ 11:29)  - Creatinine, Serum: 4.3 (01-14-20 @ 07:27)  Creatinine, Serum: 4.1 (01-13-20 @ 11:29)       ROS  General: Denies rigors, nightsweats  HEENT: Denies headache, rhinorrhea, sore throat, eye pain  CV: Denies CP, palpitations  PULM: Denies wheezing, hemoptysis  GI: Denies hematemesis, hematochezia, melena  : Denies discharge, hematuria  MSK: Denies arthralgias, myalgias  SKIN: Denies rash, lesions  NEURO: Denies paresthesias, weakness  PSYCH: Denies depression, anxiety    VITALS:  T(F): 99.5, Max: 101 (01-14-20 @ 23:20)  HR: 95  BP: 86/51  RR: 18Vital Signs Last 24 Hrs  T(C): 37.5 (15 Toni 2020 01:25), Max: 38.3 (14 Jan 2020 23:20)  T(F): 99.5 (15 Toni 2020 01:25), Max: 101 (14 Jan 2020 23:20)  HR: 95 (15 Toni 2020 08:28) (89 - 120)  BP: 86/51 (15 Toni 2020 08:28) (81/52 - 120/58)  BP(mean): 63 (15 Toni 2020 08:28) (61 - 63)  RR: 18 (14 Jan 2020 23:20) (18 - 19)  SpO2: 98% (14 Jan 2020 23:20) (97% - 99%)    PHYSICAL EXAM:  Gen: chronically ill appearing NAD, resting in bed  HEENT: Normocephalic, atraumatic  Neck: supple, no lymphadenopathy  CV: Regular rate & regular rhythm  Lungs: decreased BS at bases, no fremitus  Abdomen: Soft, BS present  Ext: Warm, well perfused, b/l AKA  Neuro: non focal, awake  Skin: sacral decubitus L ischial decub clean   Lines: no phlebitis        FH: Non-contributory  Social Hx: Non-contributory    TESTS & MEASUREMENTS:                        8.1    11.03 )-----------( 309      ( 14 Jan 2020 07:27 )             25.6     01-14    138  |  112<H>  |  62<HH>  ----------------------------<  73  4.5   |  14<L>  |  4.3<HH>    Ca    7.5<L>      14 Jan 2020 07:27  Mg     1.6     01-14                Blood Gas Venous - Lactate: 1.0 mmoL/L (01-10-20 @ 11:56)      INFECTIOUS DISEASES TESTING  MRSA PCR Result.: Positive (12-02-19 @ 18:50)      RADIOLOGY & ADDITIONAL TESTS:  I have personally reviewed the last Chest xray  CXR      CT      CARDIOLOGY TESTING  12 Lead ECG:   Ventricular Rate 97 BPM    Atrial Rate 97 BPM    P-R Interval 160 ms    QRS Duration 76 ms    Q-T Interval 354 ms    QTC Calculation(Bezet) 449 ms    P Axis 62 degrees    R Axis -17 degrees    T Axis 49 degrees    Diagnosis Line Normal sinus rhythm  Normal ECG    Confirmed by ELLIOT RIVERA MD ([id number]) on 1/10/2020 1:12:02 PM (01-10-20 @ 11:52)  12 Lead ECG:   Ventricular Rate 89 BPM    Atrial Rate 89 BPM    P-R Interval 166 ms    QRS Duration 80 ms    Q-T Interval 380 ms    QTC Calculation(Bezet) 462 ms    P Axis 32 degrees    R Axis -48 degrees    T Axis 75 degrees    Diagnosis Line Normal sinus rhythm  Left anterior fascicular block  Anterior infarct , age undetermined  Nonspecific T wave abnormality  Abnormal ECG    Confirmed by KOJO ANGULO MD ([id number]) on 1/6/2020 12:48:42 PM (01-06-20 @ 10:16)      MEDICATIONS  amitriptyline 25  calcium acetate 667  cefTRIAXone   IVPB 1000  chlorhexidine 4% Liquid 1  cholecalciferol 2000  collagenase Ointment 1  Dakins Solution - 1/2 Strength 1  doxycycline IVPB 100  fluconAZOLE   Tablet 50  heparin  Injectable 5000  hydrocortisone 20  influenza   Vaccine 0.5  levothyroxine 100  methadone    Tablet 80  metroNIDAZOLE  IVPB 500  midodrine. 2.5  oxybutynin 10  oxyCODONE  ER Tablet 20  sevelamer carbonate 800  sodium bicarbonate 1300      ANTIBIOTICS:  cefTRIAXone   IVPB 1000 milliGRAM(s) IV Intermittent every 24 hours  doxycycline IVPB 100 milliGRAM(s) IV Intermittent every 12 hours  fluconAZOLE   Tablet 50 milliGRAM(s) Oral <User Schedule>  metroNIDAZOLE  IVPB 500 milliGRAM(s) IV Intermittent every 8 hours      All available historical records have been reviewed

## 2020-01-15 NOTE — PROGRESS NOTE ADULT - SUBJECTIVE AND OBJECTIVE BOX
MARGE BELLA  59y  Male      Patient is a 59y old  Male who presents with a chief complaint of elevated creatinine, hypoglycemia (14 Jan 2020 15:50)      INTERVAL HPI/OVERNIGHT EVENTS:  He had fever overnight, he is not happy with his pain medications doses.   Vital Signs Last 24 Hrs  T(C): 36.2 (15 Toni 2020 11:45), Max: 38.3 (14 Jan 2020 23:20)  T(F): 97.2 (15 Toni 2020 11:45), Max: 101 (14 Jan 2020 23:20)  HR: 91 (15 Toni 2020 11:45) (88 - 120)  BP: 88/55 (15 Toni 2020 11:45) (75/46 - 112/57)  BP(mean): 63 (15 Toni 2020 08:28) (61 - 63)  RR: 16 (15 Toni 2020 11:45) (16 - 18)  SpO2: 98% (15 Toni 2020 11:45) (97% - 98%)            Consultant(s) Notes Reviewed:  [x ] YES  [ ] NO          MEDICATIONS  (STANDING):  amitriptyline 25 milliGRAM(s) Oral at bedtime  calcium acetate 667 milliGRAM(s) Oral three times a day with meals  cefepime   IVPB 500 milliGRAM(s) IV Intermittent daily  chlorhexidine 4% Liquid 1 Application(s) Topical <User Schedule>  cholecalciferol 2000 Unit(s) Oral daily  collagenase Ointment 1 Application(s) Topical two times a day  Dakins Solution - 1/2 Strength 1 Application(s) Topical two times a day  fluconAZOLE   Tablet 50 milliGRAM(s) Oral <User Schedule>  heparin  Injectable 5000 Unit(s) SubCutaneous every 12 hours  hydrocortisone 20 milliGRAM(s) Oral daily  influenza   Vaccine 0.5 milliLiter(s) IntraMuscular once  levothyroxine 100 MICROGram(s) Oral daily  magnesium sulfate  IVPB 2 Gram(s) IV Intermittent once  methadone    Tablet 80 milliGRAM(s) Oral every 8 hours  metroNIDAZOLE  IVPB 500 milliGRAM(s) IV Intermittent every 8 hours  midodrine. 2.5 milliGRAM(s) Oral three times a day  oxybutynin 10 milliGRAM(s) Oral two times a day  oxyCODONE  ER Tablet 20 milliGRAM(s) Oral every 12 hours  sevelamer carbonate 800 milliGRAM(s) Oral three times a day with meals  sodium bicarbonate 1300 milliGRAM(s) Oral three times a day  vancomycin  IVPB 750 milliGRAM(s) IV Intermittent daily    MEDICATIONS  (PRN):  acetaminophen   Tablet .. 650 milliGRAM(s) Oral every 6 hours PRN Temp greater or equal to 38.5C (101.3F), Mild Pain (1 - 3)  diazepam    Tablet 5 milliGRAM(s) Oral two times a day PRN anxiety  oxyCODONE    IR 30 milliGRAM(s) Oral every 4 hours PRN Severe Pain (7 - 10)      LABS                          6.7    27.35 )-----------( 336      ( 15 Toni 2020 07:44 )             21.0     01-15    138  |  109  |  59<H>  ----------------------------<  50<L>  4.8   |  13<L>  |  4.4<HH>    Ca    7.6<L>      15 Toni 2020 07:44  Mg     1.4     01-15            Lactate Trend        CAPILLARY BLOOD GLUCOSE      POCT Blood Glucose.: 76 mg/dL (15 Toni 2020 11:43)        RADIOLOGY & ADDITIONAL TESTS:    Imaging Personally Reviewed:  [ ] YES  [ ] NO    HEALTH ISSUES - PROBLEM Dx:  Adjustment disorder        PHYSICAL EXAM:  GENERAL: NAD, well-developed  HEAD:  Atraumatic, Normocephalic  EYES: EOMI, PERRLA, conjunctiva and sclera clear  NECK: Supple, No JVD  CHEST/LUNG: Clear to auscultation bilaterally; No wheeze  HEART: Regular rate and rhythm; S1 S2  ABDOMEN: Soft, Nontender, Nondistended; Bowel sounds present  EXTREMITIES:  bilateral BKA.   PSYCH: AAOx3  NEUROLOGY: non-focal  SKIN: sacral ulcer stage 4

## 2020-01-15 NOTE — PROGRESS NOTE ADULT - ASSESSMENT
A 58 yo male with PMH of CKD stage 4, Paraplegia PVD s/p bilateral BKA paraplegia with multiple chronic bilateral buttock and sacral stage 4 ulcers, suprapubic catheter, hypothyroidism, opioid dependance was sent from Nursing home for worsening renal function and hyperkalemia. .    A/P:   Progressive CKD 4:  Hyperkalemia improved K 4.5  Cr 4.3   Nephrology follow up, no indication for hemodialysis.     Infected chronic sacral ulcer stage 4:   Sepsis on admission. WBC improved. Orion fever again yesterday  On Doxycycline 100mg BID, Flagyl 500mg TID and Rocephin 1g q 24hrs.   ID follow up,   Burn looked at the wound, recommended wound care, no need for surgery.     Anemia of chronic kidney diease  Hb 6.7 will transfuse 1 RBC today. Iron total: 27, Iron sat 30% Nov 2019. B12 and Folate normal.     PVD s/p bilateral below knee amputation.     Hypomagnesemia: replaced    Hypothyroidism  Continue Synthroid    Opioid dependence:   Continue Methadone and Oxycodone.     Esophageal candidiasis  Continue fluconazole    Severe protein calories malnutrition:   Start on Ensure, dietitian evaluation.     #Progress Note Handoff:  Pending (specify):  Burn consult  Family discussion:  Disposition: Home with home service vs SNF

## 2020-01-15 NOTE — DISCHARGE NOTE NURSING/CASE MANAGEMENT/SOCIAL WORK - PATIENT PORTAL LINK FT
You can access the FollowMyHealth Patient Portal offered by [facility] by registering at the following website: [email or web address]. By joining [employer]’s FollowMyHealth portal, you will also be able to view your health information using other applications (apps) compatible with our system.

## 2020-01-15 NOTE — PROGRESS NOTE ADULT - ASSESSMENT
59 years old male known to have PVD s/p right side AKA and left below knee amputation, paraplegia with multiple chronic bilateral buttock and sacral stage 4 ulcers, suprapubic catheter, HTN (not on any medication), CKD stage IV (not following with nephrology), hypothyroidism, opioid dependance, recent admission for cold left foot (chronic left foot dry gangrene, h/o esophageal candidiasis was sent to the rehab due to worsening kidney functions and hypoglycemia with FS 45.    In the ED, pt remained hemodynamically stable but looks very pale, no active bleeding, lab workup showed leukocytosis of 19, normo anemia with Hb 7.7 (vbg 8.7), potassium 6.5 (hemolyzed) repeat 5.2, anion gap metabolic acidosis, worsening CKD 4 with creat 4.1 (baseline 3.5), , cxr ok.    # Hypotension with fever - suspecting septic shock:  - Fever of 101 overnight, hypotensive to the 60s systolic this morning, blood pressure perked up after NS bolus and PRBC.  - Leukocytosis 27,000 today, repeat was 18,000.  - Keep on NS at 75 cc/hr, monitor clinically  - Broadened IV antibiotics to cefepime and doxycycline IV  - F/up lactate and blood cultures. Draw blood cultures when patient is febrile.  - F/up ID recs    # CKD 4 - Creatinine stable around 4.4:  - Creatinine stable around 4.4 , no indication for RRT  # Hyperkalemia: resolved  # Metabolic acidosis: Non anion gap metabolic acidosis, bicarb 13, non worsening.  - increased sodium bicarb to 1300 q 8    # Behavioral abnormalities  Psych eval noted: patient deemed not suicidal and can make his own decisions  - He signed AMA on saturday but did not leave    # Acute normo anemia  Pt very pale on examination  no active bleed  keep active type and screen  transfuse to keep Hb > 7  Iron studies from Nov 2019 shows anemia of chronic disease + iron def  - Will need LINDY but not venofer    #  PVD s/p right side AKA and left below knee amputation  incision sites look clean    # hypothyroidism  - c/w levothyroxine 100. TSH 14 (going down)    # h/o esophageal candidiasis  cw fluconazole  outpt GI fu    # opioid dependance  cw methadone, f/u qtc  outpt clinic fu on discharge    # HTN (not on any medication)    # suspected folate / vit D / Vit b12 DEF  Fu level and start if needed    # Pt looks malnourished  - Dietician qiana noted    DVT PPX: heparin  GI PPx: not indicated  Dispo: Requesting home services. ACUTE  Diet: dash  Activity: OOBTC

## 2020-01-16 NOTE — PROGRESS NOTE ADULT - SUBJECTIVE AND OBJECTIVE BOX
LENGTH OF HOSPITAL STAY: 6d    CHIEF COMPLAINT:   Patient is a 59y old  Male who presents with a chief complaint of elevated potassium and hypoglycemia (15 Toni 2020 16:23)      Overnight events:    No acute events overnight    ALLERGIES:  sulfamethizole (Unknown)    MEDICATIONS:  STANDING MEDICATIONS  amitriptyline 25 milliGRAM(s) Oral at bedtime  calcium acetate 667 milliGRAM(s) Oral three times a day with meals  cefepime   IVPB 500 milliGRAM(s) IV Intermittent daily  chlorhexidine 4% Liquid 1 Application(s) Topical <User Schedule>  cholecalciferol 2000 Unit(s) Oral daily  collagenase Ointment 1 Application(s) Topical two times a day  Dakins Solution - 1/2 Strength 1 Application(s) Topical two times a day  fluconAZOLE   Tablet 50 milliGRAM(s) Oral <User Schedule>  heparin  Injectable 5000 Unit(s) SubCutaneous every 12 hours  hydrocortisone 20 milliGRAM(s) Oral daily  influenza   Vaccine 0.5 milliLiter(s) IntraMuscular once  levothyroxine 100 MICROGram(s) Oral daily  magnesium sulfate  IVPB 2 Gram(s) IV Intermittent once  methadone    Tablet 80 milliGRAM(s) Oral every 8 hours  metroNIDAZOLE  IVPB 500 milliGRAM(s) IV Intermittent every 8 hours  midodrine. 2.5 milliGRAM(s) Oral three times a day  oxybutynin 10 milliGRAM(s) Oral two times a day  oxyCODONE    IR 30 milliGRAM(s) Oral every 8 hours  polyethylene glycol 3350 17 Gram(s) Oral at bedtime  sevelamer carbonate 800 milliGRAM(s) Oral three times a day with meals  sodium bicarbonate 1300 milliGRAM(s) Oral three times a day  sodium chloride 0.9%. 1000 milliLiter(s) IV Continuous <Continuous>  vancomycin  IVPB 750 milliGRAM(s) IV Intermittent daily    PRN MEDICATIONS  acetaminophen   Tablet .. 650 milliGRAM(s) Oral every 6 hours PRN  diazepam    Tablet 5 milliGRAM(s) Oral two times a day PRN    VITALS:   T(F): 97.3  HR: 81  BP: 113/56  RR: 20  SpO2: 99%    LABS:                        7.1    14.17 )-----------( 284      ( 16 Jan 2020 07:24 )             22.8     01-16    135  |  107  |  62<HH>  ----------------------------<  56<L>  4.8   |  15<L>  |  4.5<HH>    Ca    7.5<L>      16 Jan 2020 07:24  Mg     1.8     01-16    TPro  3.8<L>  /  Alb  1.4<L>  /  TBili  0.2  /  DBili  x   /  AST  13  /  ALT  8   /  AlkPhos  153<H>  01-16          Troponin T, Serum: 0.15 ng/mL <HH> (01-16-20 @ 07:24)  Lactate, Blood: 1.4 mmol/L (01-15-20 @ 15:55)  Sedimentation Rate, Erythrocyte: 128 mm/Hr <H> (01-15-20 @ 15:45)      CARDIAC MARKERS ( 16 Jan 2020 07:24 )  x     / 0.15 ng/mL / x     / x     / x            Cultures:      RADIOLOGY:    PHYSICAL EXAM:  GEN: No acute distress  HEENT: SUJIT  LUNGS: Clear to auscultation bilaterally   HEART: S1/S2 present. RRR.   ABD: Soft, non-tender, non-distended. Bowel sounds present, chronic suprapubic  EXT:  b/l BKA ( dressing c/d/i)  NEURO: AAOX3

## 2020-01-16 NOTE — PROGRESS NOTE ADULT - ASSESSMENT
ASSESSMENT  60 yo M PVD s/p right side AKA and left below knee amputation, paraplegia with multiple chronic bilateral buttock and sacral stage 4 ulcers, suprapubic catheter, HTN (not on any medication), CKD stage IV (not following with nephrology), hypothyroidism, opioid dependance, recent admission for cold left foot (chronic left foot dry gangrene, h/o esophageal candidiasis was sent to the rehab due to worsening kidney functions and hypoglycemia     IMPRESSION  #Sacral ulcer, infected, febrile 1/14 T101    Sepsis on admission P>90 WBC 18    10/2019 wcx heel MRSA, ecoli (R fluoro, unasyn, gent, bactrim, I zosyn), enterococcus  #CKD   #Malnutrition BMI (kg/m2): 17.5  #Abx allergy: sulfamethizole (Unknown)    RECOMMENDATIONS  - f/u BCX  - Burn consult appreciated- no debridement  - Doxy 100mg q12h IV   - flagyl 500mg q8h IV   - Cefepime 500mg q24h IV  - if bcx NG, likely d/c on PO to complete 7 days.   - Recommend Palliative Care consult   - No role for prolonged IV therapy in chronic OM. Continue offloading and nutritional support  - On fluc for esophagitis, course per GI   - Trend WBC, Cr    Spectra [id number]

## 2020-01-16 NOTE — PROGRESS NOTE ADULT - ASSESSMENT
59 years old male known to have PVD s/p right side bKA and left below knee amputation, paraplegia with multiple chronic bilateral buttock and sacral stage 4 ulcers, suprapubic catheter, HTN (not on any medication), CKD stage IV (not following with nephrology), hypothyroidism, opioid dependance,  h/o esophageal candidiasis was sent from rehab due to worsening kidney functions and hypoglycemia.   Pt only states he had cloudy urine for past few days. states even if needed he will not go for hemodialysis. (10 Toni 2020 21:52)    # Sepsis POA sec to  UTI  - F/u blood culture  - urine culture  - MRSA PCR Result.: Positive: By: Real-Time PCR (Polymerase Reaction Method) (12.02.19 @ 18:50)  -  Xray Chest 1 View AP/PA (01.10.20 @ 14:04) >No consolidation, effusion or pneumothorax.  - c/w cefepime, vancomycin, flagyl    # Sacral and buttock pressure sores  - Burn eval:  Healing sacral and buttock pressure sores--> no infection  - soap and water qd, santyl ointment and dakin solution wet to dry dressing , ABD pad dressing change qd  - no surgery needed    # Esophageal candidiasis with erosive gastritis  - EGD (12.27.19 @ 09:30) >esophagitis in the lower third of the esophagus compatible with non-erosive esophagitis. (Biopsy).  Ulcers in the upper third of the esophagus and middle third of the esophagus.(Biopsy). Erosions in the antrum compatible with erosive gastritis. (Biopsy). Erythema in the stomach body and fundus compatible with non-erosive gastritis.(Biopsy).   - Surgical Pathology Report (12.27.19 @ 12:30)  Esophageal ulcers, biopsy: Fragments of esophageal squamous mucosa, with Candida esophagitis (see Comment). GMS stain highlights the budding yeastlike fungi andpseuohyphae of Candida.  Mid esophagus, biopsy:- Fragments of esophageal squamous mucosa with mild nonspecific chronic inflammation.  - c/w flucanazole  - c/w protonix    # Asymptomatic Hypoglycemia may be due to methadone use  - appears to have low glycogen reserve, presumably diet related A specific triad needs to be satisfied to count as hypoglycemia: firstly must have symptoms, then a low fingerstick needs to be confirmed with a low glucose drawn in a grey top tube (not BMP). the symptoms should immediately improve with therapy. There are also some technical issues with checking fingersticks, should be done without alcohol on the finger, and without smearing. my suggestion is to stop routine fingersticks, stop.      # PVD with B/l BKA  - vascular surgery F/u for removal of staples    # CKD stage 4, acidosis  - refused dialysis  - c/w phoslo, renagel  - c/w sodium bicarb    # Hypothyroidism  - Thyroid Stimulating Hormone, Serum: 14.10 uIU/mL (01.13.20 @ 11:29)  - c/w synthroid    # adrenal insufficiency  - c/w hydrocortisone  - c/w midodrine    # Vit D def  -c/w cholecalciferol    # Chronic opiate dependence   - follows up with outpt  pain management dr Mathew c/w his home meds    # DVT prophylaxis    # Full code. requesting to talk to hospice    # Pending: clinical improvement, F/u blood culture, urine culture, hospice eval  # Discussed with patient plan of care and counseled on pain meds  # Disposition: TBD

## 2020-01-16 NOTE — CHART NOTE - NSCHARTNOTEFT_GEN_A_CORE
Noting hereby that on his recent admission on 11/26/19, patient had sepsis (POA) 2/2 dry gangrene foot.

## 2020-01-16 NOTE — GOALS OF CARE CONVERSATION - ADVANCED CARE PLANNING - TREATMENT GUIDELINE COMMENT
DNR/DNI/no feeding tubes DNR/DNI/no feeding tubes/no dialysis DNR/DNI/no feeding tubes/no dialysis/No iV antibiotics/ No labs

## 2020-01-16 NOTE — PROGRESS NOTE ADULT - SUBJECTIVE AND OBJECTIVE BOX
SAHYNE MARGE  59y, Male  Allergy: sulfamethizole (Unknown)      CHIEF COMPLAINT: Septic shock (16 Jan 2020 12:12)      INTERVAL EVENTS/HPI  - No acute events overnight  - T(F): , Max: 97.3 (01-16-20 @ 06:47) afebrile   - Denies any worsening symptoms, continued pain  - Tolerating medication  - WBC Count: 14.17 (01-16-20 @ 07:24)<-- WBC Count: 18.04 (01-15-20 @ 15:45)  - Creatinine, Serum: 4.5 (01-16-20 @ 07:24)  Creatinine, Serum: 4.3 (01-15-20 @ 15:45)       ROS  General: Denies rigors, nightsweats  HEENT: Denies headache, rhinorrhea, sore throat, eye pain  CV: Denies CP, palpitations  PULM: Denies wheezing, hemoptysis  GI: Denies hematemesis, hematochezia, melena  : Denies discharge, hematuria  MSK: Denies arthralgias, myalgias  SKIN: Denies rash, lesions  NEURO: Denies paresthesias, weakness  PSYCH: Denies depression, anxiety    VITALS:  T(F): 97.3, Max: 97.3 (01-16-20 @ 06:47)  HR: 81  BP: 113/56  RR: 20Vital Signs Last 24 Hrs  T(C): 36.3 (16 Jan 2020 06:47), Max: 36.3 (16 Jan 2020 06:47)  T(F): 97.3 (16 Jan 2020 06:47), Max: 97.3 (16 Jan 2020 06:47)  HR: 81 (16 Jan 2020 06:47) (81 - 89)  BP: 113/56 (16 Jan 2020 06:47) (95/61 - 113/56)  BP(mean): 74 (15 Toni 2020 21:49) (72 - 74)  RR: 20 (16 Jan 2020 06:47) (18 - 20)  SpO2: 99% (16 Jan 2020 06:47) (96% - 99%)    PHYSICAL EXAM:  Gen: chronically ill appearing NAD, resting in bed  HEENT: Normocephalic, atraumatic  Neck: supple, no lymphadenopathy  CV: Regular rate & regular rhythm  Lungs: decreased BS at bases, no fremitus  Abdomen: Soft, BS present  Ext: Warm, well perfused, b/l AKA  Neuro: non focal, awake  Skin: sacral decubitus L ischial decub clean   Lines: no phlebitis      FH: Non-contributory  Social Hx: Non-contributory    TESTS & MEASUREMENTS:                        7.1    14.17 )-----------( 284      ( 16 Jan 2020 07:24 )             22.8     01-16    135  |  107  |  62<HH>  ----------------------------<  56<L>  4.8   |  15<L>  |  4.5<HH>    Ca    7.5<L>      16 Jan 2020 07:24  Mg     1.8     01-16    TPro  3.8<L>  /  Alb  1.4<L>  /  TBili  0.2  /  DBili  x   /  AST  13  /  ALT  8   /  AlkPhos  153<H>  01-16    eGFR if Non African American: 14 mL/min/1.73M2 (01-16-20 @ 07:24)  eGFR if : 16 mL/min/1.73M2 (01-16-20 @ 07:24)  eGFR if Non African American: 14 mL/min/1.73M2 (01-15-20 @ 15:45)  eGFR if : 17 mL/min/1.73M2 (01-15-20 @ 15:45)    LIVER FUNCTIONS - ( 16 Jan 2020 07:24 )  Alb: 1.4 g/dL / Pro: 3.8 g/dL / ALK PHOS: 153 U/L / ALT: 8 U/L / AST: 13 U/L / GGT: x                 Lactate, Blood: 1.4 mmol/L (01-15-20 @ 15:55)      INFECTIOUS DISEASES TESTING  MRSA PCR Result.: Positive (12-02-19 @ 18:50)      RADIOLOGY & ADDITIONAL TESTS:  I have personally reviewed the last Chest xray  CXR      CT      CARDIOLOGY TESTING  12 Lead ECG:   Ventricular Rate 97 BPM    Atrial Rate 97 BPM    P-R Interval 160 ms    QRS Duration 76 ms    Q-T Interval 354 ms    QTC Calculation(Bezet) 449 ms    P Axis 62 degrees    R Axis -17 degrees    T Axis 49 degrees    Diagnosis Line Normal sinus rhythm  Normal ECG    Confirmed by ELLIOT RIVERA MD ([id number]) on 1/10/2020 1:12:02 PM (01-10-20 @ 11:52)  12 Lead ECG:   Ventricular Rate 89 BPM    Atrial Rate 89 BPM    P-R Interval 166 ms    QRS Duration 80 ms    Q-T Interval 380 ms    QTC Calculation(Bezet) 462 ms    P Axis 32 degrees    R Axis -48 degrees    T Axis 75 degrees    Diagnosis Line Normal sinus rhythm  Left anterior fascicular block  Anterior infarct , age undetermined  Nonspecific T wave abnormality  Abnormal ECG    Confirmed by KOJO ANGULO MD ([id number]) on 1/6/2020 12:48:42 PM (01-06-20 @ 10:16)      MEDICATIONS  amitriptyline 25  calcium acetate 667  cefepime   IVPB 500  chlorhexidine 4% Liquid 1  cholecalciferol 2000  collagenase Ointment 1  Dakins Solution - 1/2 Strength 1  doxycycline IVPB 100  fluconAZOLE   Tablet 50  heparin  Injectable 5000  hydrocortisone 20  influenza   Vaccine 0.5  levothyroxine 100  methadone    Tablet 80  metroNIDAZOLE  IVPB 500  midodrine. 2.5  oxybutynin 10  oxyCODONE    IR 30  polyethylene glycol 3350 17  sevelamer carbonate 800  sodium bicarbonate 1300  sodium chloride 0.9%. 1000      ANTIBIOTICS:  cefepime   IVPB 500 milliGRAM(s) IV Intermittent daily  doxycycline IVPB 100 milliGRAM(s) IV Intermittent every 12 hours  fluconAZOLE   Tablet 50 milliGRAM(s) Oral <User Schedule>  metroNIDAZOLE  IVPB 500 milliGRAM(s) IV Intermittent every 8 hours      All available historical records have been reviewed

## 2020-01-16 NOTE — OCCUPATIONAL THERAPY INITIAL EVALUATION ADULT - GENERAL OBSERVATIONS, REHAB EVAL
Pt rec's supine in bed with nurse at bedside taking BP. BP 79/50 in supine Left UE. RN reports BP decreasing. OT to cont when appropriate.
Pt seen bedside for OT eval

## 2020-01-16 NOTE — GOALS OF CARE CONVERSATION - ADVANCED CARE PLANNING - CONVERSATION DETAILS
Patient requested palliative care and GOC were dsicussed. After discussing with his mother, he requested DNR, DNI form to be signed. MOLST form was provided discussed and patient himself signed Patient requested palliative care and GOC were dsicussed. After discussing with his mother, he requested DNR, DNI form to be signed. MOLST form was provided discussed and patient himself signed  On 1/22/2020 Pt redused IV antibiotics, no labs, and requested hospice.

## 2020-01-16 NOTE — PROGRESS NOTE ADULT - ASSESSMENT
59 years old male known to have PVD s/p right side AKA and left below knee amputation, paraplegia with multiple chronic bilateral buttock and sacral stage 4 ulcers, suprapubic catheter, HTN (not on any medication), CKD stage IV (not following with nephrology), hypothyroidism, opioid dependance, recent admission for cold left foot (chronic left foot dry gangrene, h/o esophageal candidiasis was sent to the rehab due to worsening kidney functions and hypoglycemia with FS 45.    In the ED, pt remained hemodynamically stable but looks very pale, no active bleeding, lab workup showed leukocytosis of 19, normo anemia with Hb 7.7 (vbg 8.7), potassium 6.5 (hemolyzed) repeat 5.2, anion gap metabolic acidosis, worsening CKD 4 with creat 4.1 (baseline 3.5), , cxr ok.    # Hypotension with fever - suspecting septic shock:  - Had Fever of 101, hypotensive to the 60s systolic, blood pressure perked up after NS bolus and PRBC. lactate 1.4  - Source likely Sacral ulcer, infected, OM  - Today hemodynamically stable  - Had Leukocytosis 27,000 trending down > 18,000 > 14,000  - Keep on NS at 75 cc/hr, monitor clinically  - Broadened IV antibiotics to cefepime, vanco, fluconazole, flagyl - follow vanc trough tomorrow (today is 2nd dose)  - F/up blood cultures.  - F/up ID recs    # CKD 4 - Creatinine stable around 4.4:  - Creatinine stable around 4.4 , no indication for RRT  # Hyperkalemia: resolved  # Metabolic acidosis: Non anion gap metabolic acidosis, bicarb 13, non worsening.  - increased sodium bicarb to 1300 q 8    # Behavioral abnormalities  Psych eval noted: patient deemed not suicidal and can make his own decisions  - He signed AMA on 1/11 but did not leave    # Acute normo anemia  Pt very pale on examination  no active bleed  keep active type and screen  transfuse to keep Hb > 7  Iron studies from Nov 2019 shows anemia of chronic disease + iron def  - Will need LINDY but not venofer    #  PVD s/p right side AKA and left below knee amputation  incision sites look clean    # hypothyroidism  - c/w levothyroxine 100. TSH 14 (going down)    # h/o esophageal candidiasis  cw fluconazole  outpt GI fu    # opioid dependance  cw methadone, 80 q8 and Oxycodone 30mg q8  outpt clinic fu on discharge    # HTN (not on any medication)    # suspected folate / vit D / Vit b12 DEF  Fu level and start if needed    # Pt looks malnourished  - Dietician qiana noted    DVT PPX: heparin  GI PPx: not indicated  Dispo: Requesting home services. ACUTE  Diet: K restricted  Activity: OOBTC 59 years old male known to have PVD s/p right side AKA and left below knee amputation, paraplegia with multiple chronic bilateral buttock and sacral stage 4 ulcers, suprapubic catheter, HTN (not on any medication), CKD stage IV (not following with nephrology), hypothyroidism, opioid dependance, recent admission for cold left foot (chronic left foot dry gangrene, h/o esophageal candidiasis was sent to the rehab due to worsening kidney functions and hypoglycemia with FS 45.    In the ED, pt remained hemodynamically stable but looks very pale, no active bleeding, lab workup showed leukocytosis of 19, normo anemia with Hb 7.7 (vbg 8.7), potassium 6.5 (hemolyzed) repeat 5.2, anion gap metabolic acidosis, worsening CKD 4 with creat 4.1 (baseline 3.5), , cxr ok.    # Hypotension with fever - suspecting septic shock:  - Had Fever of 101, hypotensive to the 60s systolic, blood pressure perked up after NS bolus and PRBC. lactate 1.4  - Source likely Sacral ulcer, infected, OM  - Today hemodynamically stable  - Had Leukocytosis 27,000 trending down > 18,000 > 14,000  - Keep on NS at 75 cc/hr, monitor clinically  - Broadened IV antibiotics to cefepime, vanco, fluconazole, flagyl - follow vanc trough tomorrow (today is 2nd dose)  - F/up blood cultures.  - F/up ID recs    # CKD 4 - Creatinine stable around 4.4:  - Creatinine stable around 4.4 , no indication for RRT  # Hyperkalemia: resolved  # Metabolic acidosis: Non anion gap metabolic acidosis, bicarb 13, non worsening.  - increased sodium bicarb to 1300 q 8    # Behavioral abnormalities  Psych eval noted: patient deemed not suicidal and can make his own decisions  - He signed AMA on 1/11 but did not leave    # Acute normo anemia  Pt very pale on examination  no active bleed  keep active type and screen  transfuse to keep Hb > 7  Iron studies from Nov 2019 shows anemia of chronic disease + iron def  - Will need LINDY but not venofer    #  PVD s/p right side AKA and left below knee amputation  incision sites look clean    # hypothyroidism  - c/w levothyroxine 100. TSH 14 (going down)    # h/o esophageal candidiasis  cw fluconazole  outpt GI fu    # opioid dependance  cw methadone, 80 q8 and Oxycodone 30mg q8  outpt clinic fu on discharge    # HTN (not on any medication)    # suspected folate / vit D / Vit b12 DEF  Fu level and start if needed    # Pt looks malnourished  - Dietician qiana noted    DVT PPX: heparin  GI PPx: not indicated  Dispo: Requesting home services. ACUTE  Diet: K restricted  Activity: OOBTC  Patient requested Hospice - Hospice consulted 59 years old male known to have PVD s/p right side AKA and left below knee amputation, paraplegia with multiple chronic bilateral buttock and sacral stage 4 ulcers, suprapubic catheter, HTN (not on any medication), CKD stage IV (not following with nephrology), hypothyroidism, opioid dependance, recent admission for cold left foot (chronic left foot dry gangrene, h/o esophageal candidiasis was sent to the rehab due to worsening kidney functions and hypoglycemia with FS 45.    In the ED, pt remained hemodynamically stable but looks very pale, no active bleeding, lab workup showed leukocytosis of 19, normo anemia with Hb 7.7 (vbg 8.7), potassium 6.5 (hemolyzed) repeat 5.2, anion gap metabolic acidosis, worsening CKD 4 with creat 4.1 (baseline 3.5), , cxr ok.    # Hypotension with fever - suspecting septic shock:  - Had Fever of 101, hypotensive to the 60s systolic, blood pressure perked up after NS bolus and PRBC. lactate 1.4  - Source likely Sacral ulcer, infected, OM Vs UTI  - Today hemodynamically stable  - Had Leukocytosis 27,000 trending down > 18,000 > 14,000  - Keep on NS at 75 cc/hr, monitor clinically  - Broadened IV antibiotics to cefepime, doxy, fluconazole, flagyl   - F/up blood cultures.  - F/up ID recs    # CKD 4 - Creatinine stable around 4.4:  - Creatinine stable around 4.4 , no indication for RRT  # Hyperkalemia: resolved  # Metabolic acidosis: Non anion gap metabolic acidosis, bicarb 13, non worsening.  - increased sodium bicarb to 1300 q 8    # Behavioral abnormalities  Psych eval noted: patient deemed not suicidal and can make his own decisions  - He signed AMA on 1/11 but did not leave    # Acute normo anemia  Pt very pale on examination  no active bleed  keep active type and screen  transfuse to keep Hb > 7  Iron studies from Nov 2019 shows anemia of chronic disease + iron def  - Will need LINDY but not venofer    #  PVD s/p right side AKA and left below knee amputation  incision sites look clean    # hypothyroidism  - c/w levothyroxine 100. TSH 14 (going down)    # h/o esophageal candidiasis  cw fluconazole  outpt GI fu    # opioid dependance  cw methadone, 80 q8 and Oxycodone 30mg q8  outpt clinic fu on discharge    # HTN (not on any medication)    # suspected folate / vit D / Vit b12 DEF  Fu level and start if needed    # Pt looks malnourished  - Dietician eval noted    # Sacral and buttock pressure sores  - Burn eval:  Healing sacral and buttock pressure sores--> no infection  - soap and water qd, santyl ointment and dakin solution wet to dry dressing , ABD pad dressing change qd  - no surgery needed    DVT PPX: heparin  GI PPx: not indicated  Dispo: Requesting home services. ACUTE  Diet: K restricted  Activity: OOBTC  Patient requested Hospice - Hospice consulted 59 years old male known to have PVD s/p right side AKA and left below knee amputation, paraplegia with multiple chronic bilateral buttock and sacral stage 4 ulcers, suprapubic catheter, HTN (not on any medication), CKD stage IV (not following with nephrology), hypothyroidism, opioid dependance, recent admission for cold left foot (chronic left foot dry gangrene, h/o esophageal candidiasis was sent to the rehab due to worsening kidney functions and hypoglycemia with FS 45.    In the ED, pt remained hemodynamically stable but looks very pale, no active bleeding, lab workup showed leukocytosis of 19, normo anemia with Hb 7.7 (vbg 8.7), potassium 6.5 (hemolyzed) repeat 5.2, anion gap metabolic acidosis, worsening CKD 4 with creat 4.1 (baseline 3.5), , cxr ok.    # Hypotension with fever - suspecting septic shock:  - Had Fever of 101, hypotensive to the 60s systolic, blood pressure perked up after NS bolus and PRBC. lactate 1.4  - Source likely Sacral ulcer, infected, OM Vs UTI  - Today hemodynamically stable  - Had Leukocytosis 27,000 trending down > 18,000 > 14,000  - Keep on NS at 75 cc/hr, monitor clinically  - Broadened IV antibiotics to cefepime, doxy, fluconazole, flagyl   - F/up blood cultures.  - F/up ID recs    # CKD 4 - Creatinine stable around 4.4:  - Creatinine stable around 4.4 , no indication for RRT  # Hyperkalemia: resolved  # Metabolic acidosis: Non anion gap metabolic acidosis, bicarb 13, non worsening.  - increased sodium bicarb to 1300 q 8    # Behavioral abnormalities  Psych eval noted: patient deemed not suicidal and can make his own decisions  - He signed AMA on 1/11 but did not leave    # Acute normo anemia  Pt very pale on examination  no active bleed  keep active type and screen  transfuse to keep Hb > 7  Iron studies from Nov 2019 shows anemia of chronic disease + iron def  - Will need LINDY but not venofer    #  PVD s/p right side AKA and left below knee amputation  incision sites look clean    # hypothyroidism  - c/w levothyroxine 100. TSH 14 (going down)    # h/o esophageal candidiasis  cw fluconazole  outpt GI fu    # opioid dependance  cw methadone, 80 q8 and Oxycodone 30mg q8  outpt clinic fu on discharge    # HTN (not on any medication)    # suspected folate / vit D / Vit b12 DEF  Fu level and start if needed    # Pt looks malnourished  - Dietician qiana noted    # Sacral and buttock pressure sores  - Burn eval:  Healing sacral and buttock pressure sores--> no infection  - soap and water qd, santyl ointment and dakin solution wet to dry dressing , ABD pad dressing change qd  - no surgery needed    PLAN: f/u blood Cx, Urine Cx, Urology supra pubic change, Vitamin D, Mag, lactate, phosp, K    DVT PPX: heparin  GI PPx: not indicated  Dispo: Requesting home services. ACUTE  Diet: K restricted  Activity: OOBTC  Patient requested Hospice - Hospice consulted

## 2020-01-16 NOTE — CHART NOTE - NSCHARTNOTEFT_GEN_A_CORE
ASKED TO CHANGE SPC     AO X 3   PT REFUSING CHANGE , STATES HE CHANGED IT HIMSELF LAST WEEK AND " NOTHING WRONG WITH IT "    PLEASE RECALL UROLOGY AS NEEDED

## 2020-01-16 NOTE — PROGRESS NOTE ADULT - SUBJECTIVE AND OBJECTIVE BOX
Patient is a 59y old  Male who presents with a chief complaint of elevated potassium and hypoglycemia (15 Toni 2020 16:23)    Patient was seen and examined.  Denies any complaints.  Requesting Hospice eval  All systems reviewed.     PAST MEDICAL & SURGICAL HISTORY:  Anemia  PAD (peripheral artery disease)  Hypertension  Suprapubic catheter  Pressure ulcer  Diabetes  Lumbar compression fracture  S/P below knee amputation, left  S/P below knee amputation, right  S/P debridement  Toe amputation status, right  H/O hernia repair    Allergies  sulfamethizole (Unknown)    MEDICATIONS  (STANDING):  amitriptyline 25 milliGRAM(s) Oral at bedtime  calcium acetate 667 milliGRAM(s) Oral three times a day with meals  cefepime   IVPB 500 milliGRAM(s) IV Intermittent daily  chlorhexidine 4% Liquid 1 Application(s) Topical <User Schedule>  cholecalciferol 2000 Unit(s) Oral daily  collagenase Ointment 1 Application(s) Topical two times a day  Dakins Solution - 1/2 Strength 1 Application(s) Topical two times a day  fluconAZOLE   Tablet 50 milliGRAM(s) Oral <User Schedule>  heparin  Injectable 5000 Unit(s) SubCutaneous every 12 hours  hydrocortisone 20 milliGRAM(s) Oral daily  influenza   Vaccine 0.5 milliLiter(s) IntraMuscular once  levothyroxine 100 MICROGram(s) Oral daily  magnesium sulfate  IVPB 2 Gram(s) IV Intermittent once  methadone    Tablet 80 milliGRAM(s) Oral every 8 hours  metroNIDAZOLE  IVPB 500 milliGRAM(s) IV Intermittent every 8 hours  midodrine. 2.5 milliGRAM(s) Oral three times a day  oxybutynin 10 milliGRAM(s) Oral two times a day  oxyCODONE    IR 30 milliGRAM(s) Oral every 8 hours  polyethylene glycol 3350 17 Gram(s) Oral at bedtime  sevelamer carbonate 800 milliGRAM(s) Oral three times a day with meals  sodium bicarbonate 1300 milliGRAM(s) Oral three times a day  sodium chloride 0.9%. 1000 milliLiter(s) (75 mL/Hr) IV Continuous <Continuous>  vancomycin  IVPB 750 milliGRAM(s) IV Intermittent daily    MEDICATIONS  (PRN):  acetaminophen   Tablet .. 650 milliGRAM(s) Oral every 6 hours PRN Temp greater or equal to 38.5C (101.3F), Mild Pain (1 - 3)  diazepam    Tablet 5 milliGRAM(s) Oral two times a day PRN anxiety    Vital Signs Last 24 Hrs  T(C): 36.3  T(F): 97.3  HR: 81  BP: 113/56  BP(mean): 74  RR: 20  SpO2: 99%    O/E:  Awake, alert, not in distress.  HEENT: atraumatic, EOMI.  Chest: clear.  CVS: SIS2 +, no murmur.  P/A: Soft, BS+  CNS: non focal.  Ext: B/l BKA   Skin: b/l buttock and sacral ulcers  All systems reviewed positive findings as above.    POCT Blood Glucose.: 59 mg/dL (16 Jan 2020 11:43)  POCT Blood Glucose.: 63 mg/dL (16 Jan 2020 07:27)  POCT Blood Glucose.: 87 mg/dL (15 Toni 2020 17:03)                        7.1<L>  14.17<H> )-----------( 284      ( 16 Jan 2020 07:24 )             22.8<L>                        7.8<L>  18.04<H> )-----------( 287      ( 15 Toni 2020 15:45 )             24.3<L>    01-16    135  |  107  |  62<HH>  ----------------------------<  56<L>  4.8   |  15<L>  |  4.5<HH>  01-15    136  |  106  |  58<H>  ----------------------------<  94  5.1<H>   |  16<L>  |  4.3<HH>    Ca    7.5<L>      16 Jan 2020 07:24  Ca    7.6<L>      15 Toni 2020 15:45  Ca    7.6<L>      15 Toni 2020 07:44  Mg     1.8     01-16    TPro  3.8<L>  /  Alb  1.4<L>  /  TBili  0.2  /  DBili  x   /  AST  13  /  ALT  8   /  AlkPhos  153<H>  01-16  TPro  3.8<L>  /  Alb  1.4<L>  /  TBili  <0.2  /  DBili  x   /  AST  15  /  ALT  8   /  AlkPhos  152<H>  01-15      CARDIAC MARKERS ( 16 Jan 2020 07:24 )  x     / 0.15 ng/mL<HH> / x     / x     / x

## 2020-01-17 NOTE — PROGRESS NOTE ADULT - ASSESSMENT
59 years old male known to have PVD s/p right side AKA and left below knee amputation, paraplegia with multiple chronic bilateral buttock and sacral stage 4 ulcers, suprapubic catheter, HTN (not on any medication), CKD stage IV (not following with nephrology), hypothyroidism, opioid dependance, recent admission for cold left foot (chronic left foot dry gangrene, h/o esophageal candidiasis was sent to the rehab due to worsening kidney functions and hypoglycemia with FS 45.    In the ED, pt remained hemodynamically stable but looks very pale, no active bleeding, lab workup showed leukocytosis of 19, normo anemia with Hb 7.7 (vbg 8.7), potassium 6.5 (hemolyzed) repeat 5.2, anion gap metabolic acidosis, worsening CKD 4 with creat 4.1 (baseline 3.5), , cxr ok.    # Hypotension with fever - suspecting septic shock:  - Had Fever of 101, hypotensive to the 60s systolic, blood pressure perked up after NS bolus and PRBC. lactate 1.4  - Source likely Sacral ulcer, infected, OM Vs UTI  - Today hemodynamically stable  - Had Leukocytosis 27,000 trending down > 18,000 >88696  - Keep on NS at 75 cc/hr, monitor clinically  - Broadened IV antibiotics to cefepime, doxy, fluconazole, flagyl  - Blood Cx is negative, as per ID will change to oral  - F/up ID recs    # CKD 4 - Creatinine stable around 4.4:  - Creatinine stable around 4.4 , no indication for RRT  # Hyperkalemia: resolved  # Metabolic acidosis: Non anion gap metabolic acidosis, bicarb 13, non worsening.  - increased sodium bicarb to 1300 q 8  - Nephro f/u    # Behavioral abnormalities  Psych eval noted: patient deemed not suicidal and can make his own decisions  - He signed AMA on 1/11 but did not leave    # Acute normo anemia  Pt very pale on examination  no active bleed  keep active type and screen  transfuse to keep Hb > 7  Iron studies from Nov 2019 shows anemia of chronic disease + iron def  - Will need LINDY but not venofer    #  PVD s/p right side AKA and left below knee amputation  incision sites look clean    # hypothyroidism  - c/w levothyroxine 100. TSH 14 (going down)    # h/o esophageal candidiasis  cw fluconazole  outpt GI fu    # opioid dependance  cw methadone, 80 q8 and Oxycodone 30mg q8  outpt clinic fu on discharge    # HTN (not on any medication)    # suspected folate / vit D / Vit b12 DEF  Fu level and start if needed    # Pt looks malnourished  - Dietician qiana noted    # Sacral and buttock pressure sores  - Burn eval:  Healing sacral and buttock pressure sores--> no infection  - soap and water qd, santyl ointment and dakin solution wet to dry dressing , ABD pad dressing change qd  - no surgery needed    PLAN: f/u blood Cx, Urine Cx, Urology supra pubic change, Vitamin D, Mag, lactate, phosp, K    DVT PPX: heparin  GI PPx: not indicated  Dispo: Requesting home services. ACUTE  Diet: K restricted  Activity: OOBTC  Patient requested Hospice - Palliativeconsulted

## 2020-01-17 NOTE — PROGRESS NOTE ADULT - SUBJECTIVE AND OBJECTIVE BOX
MARGE BELLA  59y, Male  Allergy: sulfamethizole (Unknown)      CHIEF COMPLAINT: SILVINA on CKD (17 Jan 2020 11:24)      INTERVAL EVENTS/HPI  - No acute events overnight  - T(F): , Max: 98.9 (01-16-20 @ 21:25)  - Denies any worsening symptoms  - Tolerating medication  - WBC Count: 11.77 (01-17-20 @ 06:19)  WBC Count: 14.17 (01-16-20 @ 07:24)  - Creatinine, Serum: 4.5 (01-17-20 @ 06:19)  Creatinine, Serum: 4.5 (01-16-20 @ 07:24)       ROS  General: Denies rigors, nightsweats  HEENT: Denies headache, rhinorrhea, sore throat, eye pain  CV: Denies CP, palpitations  PULM: Denies wheezing, hemoptysis  GI: Denies hematemesis, hematochezia, melena  : Denies discharge, hematuria  MSK: Denies arthralgias, myalgias  SKIN: Denies rash, lesions  NEURO: Denies paresthesias, weakness  PSYCH: Denies depression, anxiety    VITALS:  T(F): 98, Max: 98.9 (01-16-20 @ 21:25)  HR: 74  BP: 107/59  RR: 20Vital Signs Last 24 Hrs  T(C): 36.7 (17 Jan 2020 05:47), Max: 37.2 (16 Jan 2020 21:25)  T(F): 98 (17 Jan 2020 05:47), Max: 98.9 (16 Jan 2020 21:25)  HR: 74 (17 Jan 2020 05:47) (74 - 80)  BP: 107/59 (17 Jan 2020 05:47) (87/52 - 107/59)  BP(mean): --  RR: 20 (17 Jan 2020 05:47) (20 - 20)  SpO2: 99% (17 Jan 2020 05:47) (99% - 99%)    PHYSICAL EXAM:  Gen: chronically ill appearing NAD, resting in bed  HEENT: Normocephalic, atraumatic  Neck: supple, no lymphadenopathy  CV: Regular rate & regular rhythm  Lungs: decreased BS at bases, no fremitus  Abdomen: Soft, BS present  Ext: Warm, well perfused, b/l AKA  Neuro: non focal, awake  Skin: sacral decubitus L ischial decub clean   Lines: no phlebitis      FH: Non-contributory  Social Hx: Non-contributory    TESTS & MEASUREMENTS:                        7.5    11.77 )-----------( 278      ( 17 Jan 2020 06:19 )             23.4     01-17    135  |  108  |  58<H>  ----------------------------<  62<L>  4.5   |  15<L>  |  4.5<HH>    Ca    7.2<L>      17 Jan 2020 06:19  Phos  8.4     01-17  Mg     2.1     01-17    TPro  3.6<L>  /  Alb  1.3<L>  /  TBili  <0.2  /  DBili  x   /  AST  11  /  ALT  8   /  AlkPhos  159<H>  01-17    eGFR if Non African American: 14 mL/min/1.73M2 (01-17-20 @ 06:19)  eGFR if African American: 16 mL/min/1.73M2 (01-17-20 @ 06:19)    LIVER FUNCTIONS - ( 17 Jan 2020 06:19 )  Alb: 1.3 g/dL / Pro: 3.6 g/dL / ALK PHOS: 159 U/L / ALT: 8 U/L / AST: 11 U/L / GGT: x               Culture - Blood (collected 01-16-20 @ 07:24)  Source: .Blood None  Preliminary Report (01-17-20 @ 14:01):    No growth to date.    Culture - Blood (collected 01-16-20 @ 07:24)  Source: .Blood None  Preliminary Report (01-17-20 @ 14:01):    No growth to date.    Culture - Blood (collected 01-15-20 @ 15:45)  Source: .Blood Blood  Preliminary Report (01-16-20 @ 23:02):    No growth to date.        Blood Gas Venous - Lactate: 0.9 mmoL/L (01-17-20 @ 11:53)  Lactate, Blood: 1.4 mmol/L (01-15-20 @ 15:55)      INFECTIOUS DISEASES TESTING  MRSA PCR Result.: Positive (12-02-19 @ 18:50)      RADIOLOGY & ADDITIONAL TESTS:  I have personally reviewed the last Chest xray  CXR      CT      CARDIOLOGY TESTING  12 Lead ECG:   Ventricular Rate 97 BPM    Atrial Rate 97 BPM    P-R Interval 160 ms    QRS Duration 76 ms    Q-T Interval 354 ms    QTC Calculation(Bezet) 449 ms    P Axis 62 degrees    R Axis -17 degrees    T Axis 49 degrees    Diagnosis Line Normal sinus rhythm  Normal ECG    Confirmed by ELLIOT RIVERA MD ([id number]) on 1/10/2020 1:12:02 PM (01-10-20 @ 11:52)  12 Lead ECG:   Ventricular Rate 89 BPM    Atrial Rate 89 BPM    P-R Interval 166 ms    QRS Duration 80 ms    Q-T Interval 380 ms    QTC Calculation(Bezet) 462 ms    P Axis 32 degrees    R Axis -48 degrees    T Axis 75 degrees    Diagnosis Line Normal sinus rhythm  Left anterior fascicular block  Anterior infarct , age undetermined  Nonspecific T wave abnormality  Abnormal ECG    Confirmed by KOJO ANGULO MD ([id number]) on 1/6/2020 12:48:42 PM (01-06-20 @ 10:16)      MEDICATIONS  amitriptyline 25  calcium acetate 667  cefepime   IVPB 500  chlorhexidine 4% Liquid 1  cholecalciferol 2000  collagenase Ointment 1  Dakins Solution - 1/2 Strength 1  doxycycline IVPB 100  fluconAZOLE   Tablet 50  heparin  Injectable 5000  hydrocortisone 10  influenza   Vaccine 0.5  levothyroxine 100  megestrol 20  methadone    Tablet 80  metroNIDAZOLE  IVPB 500  midodrine. 2.5  mupirocin 2% Nasal 1  oxybutynin 10  oxyCODONE    IR 30  polyethylene glycol 3350 17  sevelamer carbonate 800  sodium bicarbonate 1300  sodium chloride 0.9%. 1000      ANTIBIOTICS:  cefepime   IVPB 500 milliGRAM(s) IV Intermittent daily  doxycycline IVPB 100 milliGRAM(s) IV Intermittent every 12 hours  fluconAZOLE   Tablet 50 milliGRAM(s) Oral <User Schedule>  metroNIDAZOLE  IVPB 500 milliGRAM(s) IV Intermittent every 8 hours      All available historical records have been reviewed

## 2020-01-17 NOTE — PROGRESS NOTE ADULT - SUBJECTIVE AND OBJECTIVE BOX
Patient is a 59y old  Male who presents with a chief complaint of elevated potassium and hypoglycemia (15 Toni 2020 16:23)    Patient was seen and examined.  C/o decrease appetite  Denies any other  complaints.  All systems reviewed.     PAST MEDICAL & SURGICAL HISTORY:  Anemia  PAD (peripheral artery disease)  Hypertension  Suprapubic catheter  Pressure ulcer  Diabetes  Lumbar compression fracture  S/P below knee amputation, left  S/P below knee amputation, right  S/P debridement  Toe amputation status, right  H/O hernia repair    Allergies  sulfamethizole (Unknown)    MEDICATIONS  (STANDING):  amitriptyline 25 milliGRAM(s) Oral at bedtime  calcium acetate 667 milliGRAM(s) Oral three times a day with meals  cefepime   IVPB 500 milliGRAM(s) IV Intermittent daily  chlorhexidine 4% Liquid 1 Application(s) Topical <User Schedule>  cholecalciferol 2000 Unit(s) Oral daily  collagenase Ointment 1 Application(s) Topical two times a day  Dakins Solution - 1/2 Strength 1 Application(s) Topical two times a day  doxycycline IVPB 100 milliGRAM(s) IV Intermittent every 12 hours  fluconAZOLE   Tablet 50 milliGRAM(s) Oral <User Schedule>  heparin  Injectable 5000 Unit(s) SubCutaneous every 12 hours  hydrocortisone 10 milliGRAM(s) Oral daily  influenza   Vaccine 0.5 milliLiter(s) IntraMuscular once  levothyroxine 100 MICROGram(s) Oral daily  megestrol 20 milliGRAM(s) Oral three times a day  methadone    Tablet 80 milliGRAM(s) Oral every 8 hours  metroNIDAZOLE  IVPB 500 milliGRAM(s) IV Intermittent every 8 hours  midodrine. 2.5 milliGRAM(s) Oral three times a day  mupirocin 2% Nasal 1 Application(s) Nasal two times a day  oxybutynin 10 milliGRAM(s) Oral two times a day  oxyCODONE    IR 30 milliGRAM(s) Oral every 8 hours  polyethylene glycol 3350 17 Gram(s) Oral at bedtime  sevelamer carbonate 800 milliGRAM(s) Oral three times a day with meals  sodium bicarbonate 1300 milliGRAM(s) Oral three times a day  sodium chloride 0.9%. 1000 milliLiter(s) (75 mL/Hr) IV Continuous <Continuous>    MEDICATIONS  (PRN):  acetaminophen   Tablet .. 650 milliGRAM(s) Oral every 6 hours PRN Temp greater or equal to 38.5C (101.3F), Mild Pain (1 - 3)  diazepam    Tablet 5 milliGRAM(s) Oral two times a day PRN anxiety    T(C): 36.4 (01-17-20 @ 14:00), Max: 37.2 (01-16-20 @ 21:25)  HR: 78 (01-17-20 @ 14:00) (74 - 80)  BP: 120/60 (01-17-20 @ 14:00) (87/52 - 120/60)  RR: 19 (01-17-20 @ 14:00) (19 - 20)  SpO2: 99% (01-17-20 @ 05:47) (99% - 99%)    O/E:  Awake, alert, not in distress.  HEENT: atraumatic, EOMI.  Chest: clear.  CVS: SIS2 +, no murmur.  P/A: Soft, BS+  CNS: non focal.  Ext: B/l BKA   Skin: b/l buttock and sacral ulcers  All systems reviewed positive findings as above.                          7.5<L>  11.77<H> )-----------( 278      ( 17 Jan 2020 06:19 )             23.4<L>                        7.1<L>  14.17<H> )-----------( 284      ( 16 Jan 2020 07:24 )             22.8<L>      01-17    135  |  108  |  58<H>  ----------------------------<  62<L>  4.5   |  15<L>  |  4.5<HH>  01-16    135  |  107  |  62<HH>  ----------------------------<  56<L>  4.8   |  15<L>  |  4.5<HH>    Ca    7.2<L>      17 Jan 2020 06:19  Ca    7.5<L>      16 Jan 2020 07:24  Ca    7.6<L>      15 Toni 2020 15:45  Phos  8.4     01-17  Mg     2.1     01-17    TPro  3.6<L>  /  Alb  1.3<L>  /  TBili  <0.2  /  DBili  x   /  AST  11  /  ALT  8   /  AlkPhos  159<H>  01-17  TPro  3.8<L>  /  Alb  1.4<L>  /  TBili  0.2  /  DBili  x   /  AST  13  /  ALT  8   /  AlkPhos  153<H>  01-16  TPro  3.8<L>  /  Alb  1.4<L>  /  TBili  <0.2  /  DBili  x   /  AST  15  /  ALT  8   /  AlkPhos  152<H>  01-15

## 2020-01-17 NOTE — PROGRESS NOTE ADULT - ASSESSMENT
ASSESSMENT  58 yo M PVD s/p right side AKA and left below knee amputation, paraplegia with multiple chronic bilateral buttock and sacral stage 4 ulcers, suprapubic catheter, HTN (not on any medication), CKD stage IV (not following with nephrology), hypothyroidism, opioid dependance, recent admission for cold left foot (chronic left foot dry gangrene, h/o esophageal candidiasis was sent to the rehab due to worsening kidney functions and hypoglycemia     IMPRESSION  #Sacral ulcer, infected, febrile 1/14 T101    BCX NG    Sepsis on admission P>90 WBC 18    10/2019 wcx heel MRSA, ecoli (R fluoro, unasyn, gent, bactrim, I zosyn), enterococcus  #CKD   #Malnutrition BMI (kg/m2): 17.5  #Abx allergy: sulfamethizole (Unknown)    RECOMMENDATIONS  - Burn consult appreciated- no debridement  - Doxy 100mg q12h IV   - flagyl 500mg q8h IV   - Cefepime 500mg q24h IV  - if bcx NG, likely d/c on PO to complete 7 days. end 1/20 (can do PO vantin 200mg daily and doxy 100mg BID and flagyl 500mg BID)  - Palliative Care  - No role for prolonged IV therapy in chronic OM. Continue offloading and nutritional support  - On fluc for esophagitis, course per GI   - Trend WBC, Cr    Spectra [phone]

## 2020-01-17 NOTE — CONSULT NOTE ADULT - ATTENDING COMMENTS
Healing sacral and buttock pressure sores--> no infection    rec: soap and water qd, santyl ointment and dakin solution wet to dry dressing , ABD pad dressing change qd    no surgery needed
Pt seen, exam confirmed.  Discussed with resident, care team, pt's mother.  He was seen by me about 6 weeks age and followed during a recent admission.   He has CKD, hypothyroidism and is markedly catabolic due to large healing wounds and poor nutrition (note very low albumin).  When hospitalized in December, TSH was approx 70 and the dose of thyroxine was increased from 50 to 100 mcg/day.  He was also started on vitamin D replacement (25 hydroxy-Vitamin D was <5).  He has frequent glucose values below normal but is not symptomatic when these occur.  Work-up last month ruled out the usual endocrine causes (cosyntropin test was normal, insulin levels were low-normal when glucose was low).  It appears that the patient's mild-moderate hypoglycemia is related to markedly reduced muscle mass and may in part be related to opiates.  Would encourage nutrition, use appetite stimulants (the pt says the Marinol was helpful).  No indication to treat with steroids.  Would check 25-hydroxy-vit D level and repeat TSH.  Will follow.

## 2020-01-17 NOTE — CONSULT NOTE ADULT - SUBJECTIVE AND OBJECTIVE BOX
Patient is a 59y old  Male who presents with a chief complaint of Septic shock (17 Jan 2020 10:36)      Admitted on: 01-10-20  HPI:  59 years old male known to have PVD s/p right side AKA and left below knee amputation, paraplegia with multiple chronic bilateral buttock and sacral stage 4 ulcers, suprapubic catheter, HTN (not on any medication), CKD stage IV (not following with nephrology), hypothyroidism, opioid dependance, recent admission for cold left foot (chronic left foot dry gangrene, h/o esophageal candidiasis was sent to the rehab due to worsening kidney functions and hypoglycemia with FS 45.    pt himself denies any chest pain, palpitations, shortness of breath, headache, lightheadedness, vision change, abdominal pain, nausea, vomiting, diarrhea, constipation, foul small urine from suprapubic cath.    Pt only states he had cloudy urine for past few days. states even if needed he will not go for hemodialysis.  Endocrinology was consulted for persistent hypoglycemia was seen by endocrinology in the past      PAST MEDICAL & SURGICAL HISTORY:  Anemia  PAD (peripheral artery disease)  Hypertension  Suprapubic catheter  Pressure ulcer  Diabetes  Lumbar compression fracture  S/P below knee amputation, left  S/P below knee amputation, right  S/P debridement  Toe amputation status, right  H/O hernia repair      FAMILY HISTORY:  FH: atrial fibrillation: father      Social History:  Tobacco:  Alcohol:  Drugs:    Home Medications:  calcium acetate 667 mg oral tablet: 1 tab(s) orally 3 times a day (with meals) (10 Toni 2020 22:43)  cholecalciferol oral tablet: 2000 unit(s) orally once a day (10 Toni 2020 22:43)  diazePAM 5 mg oral tablet: 1 tab(s) orally 2 times a day, As needed, anxiety (10 Toni 2020 22:43)  fluconazole 50 mg oral tablet: 1 tab(s) orally 2 times a day (10 Toni 2020 22:43)  methadone 10 mg oral tablet: 8 tab(s) orally every 8 hours (10 Toni 2020 22:43)  oxybutynin 10 mg/24 hr oral tablet, extended release: 1 tab(s) orally 2 times a day (10 Toni 2020 22:43)  oxyCODONE 30 mg oral tablet: 1 tab(s) orally every 8 hours, As needed, Severe Pain (7 - 10) (10 Toni 2020 22:43)    MEDICATIONS  (STANDING):  amitriptyline 25 milliGRAM(s) Oral at bedtime  calcium acetate 667 milliGRAM(s) Oral three times a day with meals  cefepime   IVPB 500 milliGRAM(s) IV Intermittent daily  chlorhexidine 4% Liquid 1 Application(s) Topical <User Schedule>  cholecalciferol 2000 Unit(s) Oral daily  collagenase Ointment 1 Application(s) Topical two times a day  Dakins Solution - 1/2 Strength 1 Application(s) Topical two times a day  doxycycline IVPB 100 milliGRAM(s) IV Intermittent every 12 hours  fluconAZOLE   Tablet 50 milliGRAM(s) Oral <User Schedule>  heparin  Injectable 5000 Unit(s) SubCutaneous every 12 hours  hydrocortisone 20 milliGRAM(s) Oral daily  influenza   Vaccine 0.5 milliLiter(s) IntraMuscular once  levothyroxine 100 MICROGram(s) Oral daily  methadone    Tablet 80 milliGRAM(s) Oral every 8 hours  metroNIDAZOLE  IVPB 500 milliGRAM(s) IV Intermittent every 8 hours  midodrine. 2.5 milliGRAM(s) Oral three times a day  mupirocin 2% Nasal 1 Application(s) Nasal two times a day  oxybutynin 10 milliGRAM(s) Oral two times a day  oxyCODONE    IR 30 milliGRAM(s) Oral every 8 hours  polyethylene glycol 3350 17 Gram(s) Oral at bedtime  sevelamer carbonate 800 milliGRAM(s) Oral three times a day with meals  sodium bicarbonate 1300 milliGRAM(s) Oral three times a day  sodium chloride 0.9%. 1000 milliLiter(s) (75 mL/Hr) IV Continuous <Continuous>    MEDICATIONS  (PRN):  acetaminophen   Tablet .. 650 milliGRAM(s) Oral every 6 hours PRN Temp greater or equal to 38.5C (101.3F), Mild Pain (1 - 3)  diazepam    Tablet 5 milliGRAM(s) Oral two times a day PRN anxiety      Allergies  sulfamethizole (Unknown)      Review of Systems:   Constitutional:  No Fever, No Chills  ENT/Mouth:  No Hearing Changes,  No Difficulty Swallowing  Eyes:  No Eye Pain, No Vision Changes  Cardiovascular:  No Chest Pain, No Palpitations  Respiratory:  No Cough, No Dyspnea  Gastrointestinal:  As described in HPI  Musculoskeletal:  No Joint Swelling, No Back Pain  Skin:  No Skin Lesions, No Jaundice  Neuro:  No Syncope, No Dizziness  Heme/Lymph:  No Bruising, No Bleeding.          Physical Examination:  T(C): 36.7 (01-17-20 @ 05:47), Max: 37.2 (01-16-20 @ 21:25)  HR: 74 (01-17-20 @ 05:47) (74 - 87)  BP: 107/59 (01-17-20 @ 05:47) (87/52 - 126/79)  RR: 20 (01-17-20 @ 05:47) (19 - 20)  SpO2: 99% (01-17-20 @ 05:47) (99% - 99%)        GEN: No acute distress  HEENT: AT, NC  LUNGS: Clear to auscultation bilaterally   HEART: S1/S2 present. RRR.   ABD: Soft, non-tender, non-distended. Bowel sounds present, chronic suprapubic  EXT:  b/l BKA  NEURO: AAOX3          Data:                        7.5    11.77 )-----------( 278      ( 17 Jan 2020 06:19 )             23.4     Hgb Trend:  7.5  01-17-20 @ 06:19  7.1  01-16-20 @ 07:24  7.8  01-15-20 @ 15:45  6.7  01-15-20 @ 07:44      01-17    135  |  108  |  58<H>  ----------------------------<  62<L>  4.5   |  15<L>  |  4.5<HH>    Ca    7.2<L>      17 Jan 2020 06:19  Phos  8.4     01-17  Mg     2.1     01-17    TPro  3.6<L>  /  Alb  1.3<L>  /  TBili  <0.2  /  DBili  x   /  AST  11  /  ALT  8   /  AlkPhos  159<H>  01-17    Liver panel trend:  TBili <0.2   /   AST 11   /   ALT 8   /   AlkP 159   /   Tptn 3.6   /   Alb 1.3    /   DBili --      01-17  TBili 0.2   /   AST 13   /   ALT 8   /   AlkP 153   /   Tptn 3.8   /   Alb 1.4    /   DBili --      01-16  TBili <0.2   /   AST 15   /   ALT 8   /   AlkP 152   /   Tptn 3.8   /   Alb 1.4    /   DBili --      01-15  TBili 0.2   /   AST 11   /   ALT 10   /   AlkP 181   /   Tptn 4.1   /   Alb 1.4    /   DBili --      01-12  TBili <0.2   /   AST 12   /   ALT 11   /   AlkP 178   /   Tptn 4.1   /   Alb 1.3    /   DBili --      01-11  TBili <0.2   /   AST 24   /   ALT 15   /   AlkP 172   /   Tptn 4.9   /   Alb 1.5    /   DBili --      01-10          Culture - Blood (collected 15 Toni 2020 15:45)  Source: .Blood Blood  Preliminary Report (16 Jan 2020 23:02):    No growth to date. Patient is a 59y old  Male who presents with a chief complaint of worsening CKD and hyperkalemia (17 Jan 2020 10:36)      Admitted on: 01-10-20  HPI:  59 years old male known to have PVD s/p right side AKA and left below knee amputation, paraplegia with multiple chronic bilateral buttock and sacral stage 4 ulcers, suprapubic catheter, HTN (not on any medication), CKD stage IV (not following with nephrology), hypothyroidism, opioid dependance, recent admission for cold left foot (chronic left foot dry gangrene, h/o esophageal candidiasis was sent to the rehab due to worsening kidney functions and hypoglycemia with FS 45.    pt himself denies any chest pain, palpitations, shortness of breath, headache, lightheadedness, vision change, abdominal pain, nausea, vomiting, diarrhea, constipation, foul small urine from suprapubic cath.    Pt only states he had cloudy urine for past few days. states even if needed he will not go for hemodialysis.  Endocrinology was consulted for persistent hypoglycemia was seen by endocrinology in the past      PAST MEDICAL & SURGICAL HISTORY:  Anemia  PAD (peripheral artery disease)  Hypertension  Suprapubic catheter  Pressure ulcer  Diabetes  Lumbar compression fracture  S/P below knee amputation, left  S/P below knee amputation, right  S/P debridement  Toe amputation status, right  H/O hernia repair      FAMILY HISTORY:  FH: atrial fibrillation: father      Social History:  Tobacco:  Alcohol:  Drugs:    Home Medications:  calcium acetate 667 mg oral tablet: 1 tab(s) orally 3 times a day (with meals) (10 Toni 2020 22:43)  cholecalciferol oral tablet: 2000 unit(s) orally once a day (10 Toni 2020 22:43)  diazePAM 5 mg oral tablet: 1 tab(s) orally 2 times a day, As needed, anxiety (10 Toni 2020 22:43)  fluconazole 50 mg oral tablet: 1 tab(s) orally 2 times a day (10 Toni 2020 22:43)  methadone 10 mg oral tablet: 8 tab(s) orally every 8 hours (10 Toni 2020 22:43)  oxybutynin 10 mg/24 hr oral tablet, extended release: 1 tab(s) orally 2 times a day (10 Toni 2020 22:43)  oxyCODONE 30 mg oral tablet: 1 tab(s) orally every 8 hours, As needed, Severe Pain (7 - 10) (10 Toni 2020 22:43)    MEDICATIONS  (STANDING):  amitriptyline 25 milliGRAM(s) Oral at bedtime  calcium acetate 667 milliGRAM(s) Oral three times a day with meals  cefepime   IVPB 500 milliGRAM(s) IV Intermittent daily  chlorhexidine 4% Liquid 1 Application(s) Topical <User Schedule>  cholecalciferol 2000 Unit(s) Oral daily  collagenase Ointment 1 Application(s) Topical two times a day  Dakins Solution - 1/2 Strength 1 Application(s) Topical two times a day  doxycycline IVPB 100 milliGRAM(s) IV Intermittent every 12 hours  fluconAZOLE   Tablet 50 milliGRAM(s) Oral <User Schedule>  heparin  Injectable 5000 Unit(s) SubCutaneous every 12 hours  hydrocortisone 20 milliGRAM(s) Oral daily  influenza   Vaccine 0.5 milliLiter(s) IntraMuscular once  levothyroxine 100 MICROGram(s) Oral daily  methadone    Tablet 80 milliGRAM(s) Oral every 8 hours  metroNIDAZOLE  IVPB 500 milliGRAM(s) IV Intermittent every 8 hours  midodrine. 2.5 milliGRAM(s) Oral three times a day  mupirocin 2% Nasal 1 Application(s) Nasal two times a day  oxybutynin 10 milliGRAM(s) Oral two times a day  oxyCODONE    IR 30 milliGRAM(s) Oral every 8 hours  polyethylene glycol 3350 17 Gram(s) Oral at bedtime  sevelamer carbonate 800 milliGRAM(s) Oral three times a day with meals  sodium bicarbonate 1300 milliGRAM(s) Oral three times a day  sodium chloride 0.9%. 1000 milliLiter(s) (75 mL/Hr) IV Continuous <Continuous>    MEDICATIONS  (PRN):  acetaminophen   Tablet .. 650 milliGRAM(s) Oral every 6 hours PRN Temp greater or equal to 38.5C (101.3F), Mild Pain (1 - 3)  diazepam    Tablet 5 milliGRAM(s) Oral two times a day PRN anxiety      Allergies  sulfamethizole (Unknown)      Review of Systems:   as mentioned in HPI          Physical Examination:  T(C): 36.7 (01-17-20 @ 05:47), Max: 37.2 (01-16-20 @ 21:25)  HR: 74 (01-17-20 @ 05:47) (74 - 87)  BP: 107/59 (01-17-20 @ 05:47) (87/52 - 126/79)  RR: 20 (01-17-20 @ 05:47) (19 - 20)  SpO2: 99% (01-17-20 @ 05:47) (99% - 99%)        GEN: No acute distress  HEENT: AT, NC  LUNGS: Clear to auscultation bilaterally   HEART: S1/S2 present. RRR.   ABD: Soft, non-tender, non-distended. Bowel sounds present, chronic suprapubic  EXT:  b/l BKA  NEURO: AAOX3          Data:                        7.5    11.77 )-----------( 278      ( 17 Jan 2020 06:19 )             23.4     Hgb Trend:  7.5  01-17-20 @ 06:19  7.1  01-16-20 @ 07:24  7.8  01-15-20 @ 15:45  6.7  01-15-20 @ 07:44      01-17    135  |  108  |  58<H>  ----------------------------<  62<L>  4.5   |  15<L>  |  4.5<HH>    Ca    7.2<L>      17 Jan 2020 06:19  Phos  8.4     01-17  Mg     2.1     01-17    TPro  3.6<L>  /  Alb  1.3<L>  /  TBili  <0.2  /  DBili  x   /  AST  11  /  ALT  8   /  AlkPhos  159<H>  01-17    Liver panel trend:  TBili <0.2   /   AST 11   /   ALT 8   /   AlkP 159   /   Tptn 3.6   /   Alb 1.3    /   DBili --      01-17  TBili 0.2   /   AST 13   /   ALT 8   /   AlkP 153   /   Tptn 3.8   /   Alb 1.4    /   DBili --      01-16  TBili <0.2   /   AST 15   /   ALT 8   /   AlkP 152   /   Tptn 3.8   /   Alb 1.4    /   DBili --      01-15  TBili 0.2   /   AST 11   /   ALT 10   /   AlkP 181   /   Tptn 4.1   /   Alb 1.4    /   DBili --      01-12  TBili <0.2   /   AST 12   /   ALT 11   /   AlkP 178   /   Tptn 4.1   /   Alb 1.3    /   DBili --      01-11  TBili <0.2   /   AST 24   /   ALT 15   /   AlkP 172   /   Tptn 4.9   /   Alb 1.5    /   DBili --      01-10          Culture - Blood (collected 15 Toni 2020 15:45)  Source: .Blood Blood  Preliminary Report (16 Jan 2020 23:02):    No growth to date.

## 2020-01-17 NOTE — CONSULT NOTE ADULT - ASSESSMENT
#)Hypoglycemia  -HbA1c 4.8 (1/11/2020)  -No evidence of adrenal insufficiency from blood work before in dec 2019  -Low insulin level of 2 , c peptide within normal limits in dec 2019    #)Hypothyroidism  -c/w levothyroxine 100 OD  -Last TSH 14 on 1/13/2020 but was checked during acute infection   -please check TSH as an OP to decide on dosage adjustments    #)vitamin D deficiency likely sec to CKD and malnutrition (albumin 1.3)  -on choelcalciferol 2000 OD    #)Corrected calcium 9.3,  might be due to secondary hyperparathyroidism   - 59 years old male known to have PVD s/p right side AKA and left below knee amputation, paraplegia with multiple chronic bilateral buttock and sacral stage 4 ulcers, suprapubic catheter, HTN (not on any medication), CKD stage IV (not following with nephrology), hypothyroidism, opioid dependance presented for worsening CKD and hyperkalemia. Endocrinology was consulted for low blood sugar.       #)Hypoglycemia DD: likely due to chronic malnutrition vs methadone (some case reports about hypoglycemia)  -HbA1c 4.8 (1/11/2020)  -No evidence of adrenal insufficiency from blood work before in dec 2019 cosyntropin test negative, no need for hydrocortisone taper to 10mg for 1 week and stop it.  -Low insulin level of 2 , c peptide within normal limits in dec 2019  -Pt reported better appetite on marinol  -Please check FS and follow up as an OP      #)Hypothyroidism  -c/w levothyroxine 100 OD  -Last TSH 14 on 1/13/2020 but was checked during acute infection   -please check TSH as an OP to decide on dosage adjustments    #)vitamin D deficiency likely sec to CKD and malnutrition (albumin 1.3)  -on choelcalciferol 2000 OD    #)Corrected calcium 9.3,  might be due to secondary hyperparathyroidism     Discussed with attending

## 2020-01-17 NOTE — PROGRESS NOTE ADULT - SUBJECTIVE AND OBJECTIVE BOX
LENGTH OF HOSPITAL STAY: 7d    CHIEF COMPLAINT:   Patient is a 59y old  Male who presents with a chief complaint of Septic shock (16 Jan 2020 12:12)      Overnight events:    No acute events overnight    ALLERGIES:  sulfamethizole (Unknown)    MEDICATIONS:  STANDING MEDICATIONS  amitriptyline 25 milliGRAM(s) Oral at bedtime  calcium acetate 667 milliGRAM(s) Oral three times a day with meals  cefepime   IVPB 500 milliGRAM(s) IV Intermittent daily  chlorhexidine 4% Liquid 1 Application(s) Topical <User Schedule>  cholecalciferol 2000 Unit(s) Oral daily  collagenase Ointment 1 Application(s) Topical two times a day  Dakins Solution - 1/2 Strength 1 Application(s) Topical two times a day  doxycycline IVPB 100 milliGRAM(s) IV Intermittent every 12 hours  fluconAZOLE   Tablet 50 milliGRAM(s) Oral <User Schedule>  heparin  Injectable 5000 Unit(s) SubCutaneous every 12 hours  hydrocortisone 20 milliGRAM(s) Oral daily  influenza   Vaccine 0.5 milliLiter(s) IntraMuscular once  levothyroxine 100 MICROGram(s) Oral daily  methadone    Tablet 80 milliGRAM(s) Oral every 8 hours  metroNIDAZOLE  IVPB 500 milliGRAM(s) IV Intermittent every 8 hours  midodrine. 2.5 milliGRAM(s) Oral three times a day  mupirocin 2% Nasal 1 Application(s) Nasal two times a day  oxybutynin 10 milliGRAM(s) Oral two times a day  oxyCODONE    IR 30 milliGRAM(s) Oral every 8 hours  polyethylene glycol 3350 17 Gram(s) Oral at bedtime  sevelamer carbonate 800 milliGRAM(s) Oral three times a day with meals  sodium bicarbonate 1300 milliGRAM(s) Oral three times a day  sodium chloride 0.9%. 1000 milliLiter(s) IV Continuous <Continuous>    PRN MEDICATIONS  acetaminophen   Tablet .. 650 milliGRAM(s) Oral every 6 hours PRN  diazepam    Tablet 5 milliGRAM(s) Oral two times a day PRN    VITALS:   T(F): 98  HR: 74  BP: 107/59  RR: 20  SpO2: 99%    LABS:                        7.5    11.77 )-----------( 278      ( 17 Jan 2020 06:19 )             23.4     01-17    135  |  108  |  58<H>  ----------------------------<  62<L>  4.5   |  15<L>  |  4.5<HH>    Ca    7.2<L>      17 Jan 2020 06:19  Phos  8.4     01-17  Mg     2.1     01-17    TPro  3.6<L>  /  Alb  1.3<L>  /  TBili  <0.2  /  DBili  x   /  AST  11  /  ALT  8   /  AlkPhos  159<H>  01-17              Culture - Blood (collected 15 Toni 2020 15:45)  Source: .Blood Blood  Preliminary Report (16 Jan 2020 23:02):    No growth to date.      CARDIAC MARKERS ( 16 Jan 2020 07:24 )  x     / 0.15 ng/mL / x     / x     / x            Cultures:    Culture - Blood (collected 15 Toni 2020 15:45)  Source: .Blood Blood  Preliminary Report (16 Jan 2020 23:02):    No growth to date.        RADIOLOGY:    PHYSICAL EXAM:  GEN: No acute distress  HEENT: SUJIT  LUNGS: Clear to auscultation bilaterally   HEART: S1/S2 present. RRR.   ABD: Soft, non-tender, non-distended. Bowel sounds present, chronic suprapubic  EXT:  b/l BKA ( dressing c/d/i)  NEURO: AAOX3

## 2020-01-17 NOTE — PROGRESS NOTE ADULT - ASSESSMENT
59 years old male known to have PVD s/p right side bKA and left below knee amputation, paraplegia with multiple chronic bilateral buttock and sacral stage 4 ulcers, suprapubic catheter, HTN (not on any medication), CKD stage IV (not following with nephrology), hypothyroidism, opioid dependance,  h/o esophageal candidiasis was sent from rehab due to worsening kidney functions and hypoglycemia.   Pt only states he had cloudy urine for past few days. states even if needed he will not go for hemodialysis. (10 Toni 2020 21:52)    # Sepsis POA sec to  UTI  - blood culture neg  - F/u urine culture  - MRSA PCR Result.: Positive: By: Real-Time PCR (Polymerase Reaction Method) (12.02.19 @ 18:50)  -  Xray Chest 1 View AP/PA (01.10.20 @ 14:04) >No consolidation, effusion or pneumothorax.  - c/w doxycycline, flagyl, vantin    # Sacral and buttock pressure sores  - Burn eval:  Healing sacral and buttock pressure sores--> no infection  - soap and water qd, santyl ointment and dakin solution wet to dry dressing , ABD pad dressing change qd  - no surgery needed    # Esophageal candidiasis with erosive gastritis  - EGD (12.27.19 @ 09:30) >esophagitis in the lower third of the esophagus compatible with non-erosive esophagitis. (Biopsy).  Ulcers in the upper third of the esophagus and middle third of the esophagus.(Biopsy). Erosions in the antrum compatible with erosive gastritis. (Biopsy). Erythema in the stomach body and fundus compatible with non-erosive gastritis.(Biopsy).   - Surgical Pathology Report (12.27.19 @ 12:30)  Esophageal ulcers, biopsy: Fragments of esophageal squamous mucosa, with Candida esophagitis (see Comment). GMS stain highlights the budding yeastlike fungi andpseuohyphae of Candida.  Mid esophagus, biopsy:- Fragments of esophageal squamous mucosa with mild nonspecific chronic inflammation.  - completed course with  flucanazole  - c/w protonix    # Asymptomatic Hypoglycemia, likely due to chronic malnutrition vs methadone (some case reports about hypoglycemia)  -HbA1c 4.8 (1/11/2020)  - Endocrine F/u :It appears that the patient's mild-moderate hypoglycemia is related to markedly reduced muscle mass and may in part be related to opiates.  Would encourage nutrition, use appetite stimulants.    # PVD with B/l BKA  - vascular surgery F/u for removal of staples    # decrease PO intake   - stat megace    # CKD stage 4, acidosis  - refused dialysis  - c/w phoslo, renagel  - c/w sodium bicarb    # Hypothyroidism  - Thyroid Stimulating Hormone, Serum: 14.10 uIU/mL (01.13.20 @ 11:29)  - c/w synthroid  -please check TSH as an OP to decide on dosage adjustments    # No evidence of adrenal insufficiency  - Work-up last month ruled out the usual endocrine causes (cosyntropin test was normal, insulin levels were low-normal when glucose was low  - taper and discontinue  hydrocortisone    # Vit D def  - F/u Vit D levels  -c/w cholecalciferol    # Chronic opiate dependence   - follows up with outpt  pain management dr Mathew c/w his home meds    # DVT prophylaxis    # GOC discussed: Pt wants to be DNR/ DNI/ no feeding tube/no dialysis/ limited medical intervention    # Pending: F/u urine culture, awaiting home health-aide set up , lives alone  # Discussed with patient plan of care.   # Disposition: TBD

## 2020-01-18 NOTE — PROGRESS NOTE ADULT - SUBJECTIVE AND OBJECTIVE BOX
Reason for Endocrinology Consult: hypothyroidsm  PAST MEDICAL & SURGICAL HISTORY:  Anemia  PAD (peripheral artery disease)  Hypertension  Suprapubic catheter  Pressure ulcer  Diabetes  Lumbar compression fracture  S/P below knee amputation, left  S/P below knee amputation, right  S/P debridement  Toe amputation status, right  H/O hernia repair    FAMILY HISTORY:  FH: atrial fibrillation: father      SH:  Smoking  Etoh:  Recreational Drugs:    Home Medications:  calcium acetate 667 mg oral tablet: 1 tab(s) orally 3 times a day (with meals) (10 Toni 2020 22:43)  cholecalciferol oral tablet: 2000 unit(s) orally once a day (10 Toni 2020 22:43)  diazePAM 5 mg oral tablet: 1 tab(s) orally 2 times a day, As needed, anxiety (10 Toni 2020 22:43)  fluconazole 50 mg oral tablet: 1 tab(s) orally 2 times a day (10 Toni 2020 22:43)  methadone 10 mg oral tablet: 8 tab(s) orally every 8 hours (10 Toni 2020 22:43)  oxybutynin 10 mg/24 hr oral tablet, extended release: 1 tab(s) orally 2 times a day (10 Toni 2020 22:43)  oxyCODONE 30 mg oral tablet: 1 tab(s) orally every 8 hours, As needed, Severe Pain (7 - 10) (10 Toni 2020 22:43)      Current (Non-Endocrine) Meds:  acetaminophen   Tablet .. 650 milliGRAM(s) Oral every 6 hours PRN  amitriptyline 25 milliGRAM(s) Oral at bedtime  calcium acetate 667 milliGRAM(s) Oral three times a day with meals  cefpodoxime 200 milliGRAM(s) Oral daily  chlorhexidine 4% Liquid 1 Application(s) Topical <User Schedule>  cholecalciferol 2000 Unit(s) Oral daily  collagenase Ointment 1 Application(s) Topical two times a day  Dakins Solution - 1/2 Strength 1 Application(s) Topical two times a day  diazepam    Tablet 5 milliGRAM(s) Oral two times a day PRN  doxycycline hyclate Capsule 100 milliGRAM(s) Oral every 12 hours  heparin  Injectable 5000 Unit(s) SubCutaneous every 12 hours  influenza   Vaccine 0.5 milliLiter(s) IntraMuscular once  methadone    Tablet 80 milliGRAM(s) Oral every 8 hours  metroNIDAZOLE    Tablet 500 milliGRAM(s) Oral two times a day  midodrine. 2.5 milliGRAM(s) Oral three times a day  mupirocin 2% Nasal 1 Application(s) Nasal two times a day  oxybutynin 10 milliGRAM(s) Oral two times a day  oxyCODONE    IR 30 milliGRAM(s) Oral every 8 hours  polyethylene glycol 3350 17 Gram(s) Oral at bedtime  sevelamer carbonate 800 milliGRAM(s) Oral three times a day with meals  sodium bicarbonate 1300 milliGRAM(s) Oral three times a day      Current Endocrine Meds:   hydrocortisone 10 milliGRAM(s) Oral daily  levothyroxine 100 MICROGram(s) Oral daily  megestrol 20 milliGRAM(s) Oral three times a day      Allergies:  sulfamethizole (Unknown)      ROS:  Denies the following except as indicated.    General: weight loss/weight gain, decreased appetite, fatigue, fever  Eyes: blurry vision, double vision  ENT: neck swelling, dysphagia, voice changes   CV: palpitations, SOB, chest pain, cough  GI: nausea, vomiting, diarrhea, constipation, abdominal pain  : nocturia,  polyuria, dysuria  Endo: decreased libido, heat/cold intolerance, jitteriness  MSK: arthralgias, myalgias  Skin: rash, dryness, diaphoresis  Neuro: pedal numbness,pedal paresthesias, pedal pain        Vital Signs Last 24 Hrs  T(C): 37 (18 Jan 2020 13:34), Max: 37 (18 Jan 2020 13:34)  T(F): 98.6 (18 Jan 2020 13:34), Max: 98.6 (18 Jan 2020 13:34)  HR: 75 (18 Jan 2020 13:34) (70 - 79)  BP: 112/61 (18 Jan 2020 13:34) (106/57 - 132/65)  BP(mean): 81 (18 Jan 2020 13:34) (76 - 81)  RR: 18 (18 Jan 2020 13:34) (18 - 18)  SpO2: 100% (18 Jan 2020 13:34) (99% - 100%)  Constitutional: WN/WD in NAD.   Neck: no thyromegaly or palpable thyroid nodules   Respiratory: lungs CTAB.  Cardiovascular: regular rate and rhythm, normal S1 and S2, no audible murmurs  GI: soft, NT/ND, no masses/HSM appreciated.  Ext: no edema, no ulcers, pedal pulses palpable bilaterally  Neurology: no tremor, monofilament sensation intact in feet  Psychiatric: A&O x 3, normal affect/mood.        LABS:                        7.5    11.44 )-----------( 253      ( 18 Jan 2020 06:48 )             23.9     01-18    136  |  110  |  59<H>  ----------------------------<  112<H>  4.5   |  13<L>  |  4.6<HH>    Ca    7.2<L>      18 Jan 2020 06:48  Phos  7.9     01-18  Mg     1.9     01-18    TPro  3.6<L>  /  Alb  1.1<L>  /  TBili  <0.2  /  DBili  x   /  AST  9   /  ALT  7   /  AlkPhos  169<H>  01-18        Hemoglobin A1C, Whole Blood: 4.8 (01-11 @ 16:32)                         Thyroid Stimulating Hormone, Serum: 14.10 (01-13 @ 11:29)  Thyroid Stimulating Hormone, Serum: 28.50 (01-11 @ 16:32)  Thyroid Stimulating Hormone, Serum: 62.50 (12-26 @ 07:30)  Thyroid Stimulating Hormone, Serum: 87.60 (12-13 @ 04:30)  Thyroid Stimulating Hormone, Serum: 93.10 (12-09 @ 16:57)  Thyroid Stimulating Hormone, Serum: 72.80 (11-29 @ 07:32)  Thyroid Stimulating Hormone, Serum: 70.10 (11-29 @ 07:32)  Thyroid Stimulating Hormone, Serum: 104.00 (11-28 @ 06:29)    Free Thyroxine, Serum: 0.3 ng/dL (11-29-19 @ 07:32)    Triiodothyronine, Total (T3 Total): 54 ng/dL (11-29-19 @ 07:32)      RADIOLOGY & ADDITIONAL STUDIES:    A/P:59yMale    1.  DM  For now:  Glargine-    units at bedtime  Lispro-    units three times a day before meals   Will continue to monitor     At discharge, recommend:      Pt can follow up at discharge with:    Above discussed with resident.

## 2020-01-18 NOTE — CHART NOTE - NSCHARTNOTEFT_GEN_A_CORE
Registered Dietitian Follow-Up     Patient Profile Reviewed                           Yes [x]   No []     Nutrition History Previously Obtained        Yes []  No [x]  Pt continues to decline speaking to RD. No response from emergency contact.     Pertinent Subjective Information: Per 1/15 RD assessment" Pt very stubborn when comes to hospital food and has many food preferences. If preferences not provided then pt refuses to eat. Kitchen staff aware of preferences but also state pt changes mind when meals are delivered. Encouraged pt to try to work with kitchen staff more effectively. Pt denied all offers of nutrition supplements at this time despite being aware of increased nutritional need for wound healing."     Pertinent Medical Interventions: Septic shock - improved per progress notes. CKD IV - no plan for HD.    Diet order: Renal Replacement - no protein restriction.     Anthropometrics:  - Ht. 185.42 cm  - Wt. 60.1kg (1/11); no new wt at this time  - BMI 19.6 - adjusted for b/l BKAs   - IBW 73.9 kg (adjusted)     Pertinent Lab Data: 1/18: RBC-2.57, H/H-7.5/23.9, BUN-59, creat-4.6, gluc-112, POCT gluc-126 (17:01) vs. 112 (11:49) vs. 99 (7:50), PO4-7.9; 1/11: OnoT0T-5.8%     Pertinent Meds: heparin, megace, miralax, calcium acetate, cholecalciferol, renvela     Physical Findings:  - Appearance: Alert & oriented  - GI function: Last BM 1/15  - Tubes: no feeding tubes  - Oral/Mouth cavity: no chewing/swallowing difficulty reported  - Skin: B/L BKAs. Stage IV pressure ulcer to sacrum and R buttocks. stage II pressure ulcer to R IT      Nutrition Requirements  Weight Used: 60.1kg ABW (continued from RD initial assessment 1/12)     Estimated calorie needs: 1803 - 2103 kcals/day (30-35 kcals/kg for multiple PU)      Estimated protein needs: 54 - 66 gms/day (0.9 - 1.1 gm/kg for PU and CKD4 not on HD)      Estimated fluid needs: 1ml/kcal or per LIP      Nutrient Intake: Continues to consume ~50% po intake on average at this time.     [x] Previous Nutrition Diagnosis: increased nutrient needs             [x] Ongoing          [] Resolved     Nutrition Intervention: Meals and Snacks, Vitamin & Mineral Supplements     Goal/Expected Outcome: Pt to achieve & maintain at least 75% po intake over next 3 days.     Indicator/Monitoring: Energy intake, glucose profile, renal profile, nutrition focused physical findings, body composition, micronutrient intake.    Recommendation: Consider adding MVI (without minerals) q24hrs given pt with increased nutrient demand in setting of pressure ulcers & inadequate po intake at this time. Reviewed with LIP on unit.

## 2020-01-18 NOTE — PROGRESS NOTE ADULT - ASSESSMENT
59 years old male known to have PVD s/p right side AKA and left below knee amputation, paraplegia with multiple chronic bilateral buttock and sacral stage 4 ulcers, suprapubic catheter, HTN (not on any medication), CKD stage IV (not following with nephrology), hypothyroidism, opioid dependance severe ASPVD presenting with Acidosis  ·	CKD 4 / creat at baseline no further renal work up  ·	corrected Ca at goal.  ·	Phosphorus noted please increase phoslo to 2 tabs po qac   ·	Acidosis probably related to CKD / continue sodium bicarb 1300 mg po q8h  ·	start D5w with 3 amps sodium bicarb (150 meq) at 100 cc per hour   ·	Anemia chronic / previous  Fe studies noted / repeat  start Aranesp 20 mcg sq qweek   ·	as per chart noted patient to be on hospice palliative and no HD     will follow for one more visit

## 2020-01-18 NOTE — PROGRESS NOTE ADULT - SUBJECTIVE AND OBJECTIVE BOX
Nephrology progress note    THIS IS AN INCOMPLETE NOTE . FULL NOTE TO FOLLOW SHORTLY    Patient is seen and examined, events over the last 24 h noted .    Allergies:  sulfamethizole (Unknown)    Hospital Medications:   MEDICATIONS  (STANDING):  amitriptyline 25 milliGRAM(s) Oral at bedtime  calcium acetate 667 milliGRAM(s) Oral three times a day with meals  cefpodoxime 200 milliGRAM(s) Oral daily  chlorhexidine 4% Liquid 1 Application(s) Topical <User Schedule>  cholecalciferol 2000 Unit(s) Oral daily  collagenase Ointment 1 Application(s) Topical two times a day  Dakins Solution - 1/2 Strength 1 Application(s) Topical two times a day  doxycycline hyclate Capsule 100 milliGRAM(s) Oral every 12 hours  heparin  Injectable 5000 Unit(s) SubCutaneous every 12 hours  hydrocortisone 10 milliGRAM(s) Oral daily  influenza   Vaccine 0.5 milliLiter(s) IntraMuscular once  levothyroxine 100 MICROGram(s) Oral daily  megestrol 20 milliGRAM(s) Oral three times a day  methadone    Tablet 80 milliGRAM(s) Oral every 8 hours  metroNIDAZOLE    Tablet 500 milliGRAM(s) Oral two times a day  midodrine. 2.5 milliGRAM(s) Oral three times a day  mupirocin 2% Nasal 1 Application(s) Nasal two times a day  oxybutynin 10 milliGRAM(s) Oral two times a day  oxyCODONE    IR 30 milliGRAM(s) Oral every 8 hours  polyethylene glycol 3350 17 Gram(s) Oral at bedtime  sevelamer carbonate 800 milliGRAM(s) Oral three times a day with meals  sodium bicarbonate 1300 milliGRAM(s) Oral three times a day        VITALS:  T(F): 96.5 (01-18-20 @ 05:13), Max: 97.9 (01-17-20 @ 22:24)  HR: 70 (01-18-20 @ 05:13)  BP: 106/57 (01-18-20 @ 05:13)  RR: 18 (01-18-20 @ 05:13)  SpO2: 99% (01-18-20 @ 05:13)  Wt(kg): --    01-17 @ 07:01  -  01-18 @ 07:00  --------------------------------------------------------  IN: 900 mL / OUT: 1600 mL / NET: -700 mL          PHYSICAL EXAM:  Constitutional: NAD  HEENT: anicteric sclera, oropharynx clear, MMM  Neck: No JVD  Respiratory: CTAB, no wheezes, rales or rhonchi  Cardiovascular: S1, S2, RRR  Gastrointestinal: BS+, soft, NT/ND  Extremities: No cyanosis or clubbing. No peripheral edema  :  No brown.   Skin: No rashes    LABS:  01-18    136  |  110  |  59<H>  ----------------------------<  112<H>  4.5   |  13<L>  |  4.6<HH>    Ca    7.2<L>      18 Jan 2020 06:48  Phos  7.9     01-18  Mg     1.9     01-18    TPro  3.6<L>  /  Alb  1.1<L>  /  TBili  <0.2  /  DBili      /  AST  9   /  ALT  7   /  AlkPhos  169<H>  01-18                          7.5    11.44 )-----------( 253      ( 18 Jan 2020 06:48 )             23.9       Urine Studies:      RADIOLOGY & ADDITIONAL STUDIES: Nephrology progress note  Patient is seen and examined, events over the last 24 h noted .  Asked to follow with patient because of acidosis   at presentation patient said he is going home under hospice care     Allergies:  sulfamethizole (Unknown)    Hospital Medications:   MEDICATIONS  (STANDING):  amitriptyline 25 milliGRAM(s) Oral at bedtime  calcium acetate 667 milliGRAM(s) Oral three times a day with meals  cefpodoxime 200 milliGRAM(s) Oral daily  chlorhexidine 4% Liquid 1 Application(s) Topical <User Schedule>  cholecalciferol 2000 Unit(s) Oral daily  collagenase Ointment 1 Application(s) Topical two times a day  Dakins Solution - 1/2 Strength 1 Application(s) Topical two times a day  doxycycline hyclate Capsule 100 milliGRAM(s) Oral every 12 hours  heparin  Injectable 5000 Unit(s) SubCutaneous every 12 hours  hydrocortisone 10 milliGRAM(s) Oral daily  influenza   Vaccine 0.5 milliLiter(s) IntraMuscular once  levothyroxine 100 MICROGram(s) Oral daily  megestrol 20 milliGRAM(s) Oral three times a day  methadone    Tablet 80 milliGRAM(s) Oral every 8 hours  metroNIDAZOLE    Tablet 500 milliGRAM(s) Oral two times a day  midodrine. 2.5 milliGRAM(s) Oral three times a day  mupirocin 2% Nasal 1 Application(s) Nasal two times a day  oxybutynin 10 milliGRAM(s) Oral two times a day  oxyCODONE    IR 30 milliGRAM(s) Oral every 8 hours  polyethylene glycol 3350 17 Gram(s) Oral at bedtime  sevelamer carbonate 800 milliGRAM(s) Oral three times a day with meals  sodium bicarbonate 1300 milliGRAM(s) Oral three times a day        VITALS:  T(F): 96.5 (01-18-20 @ 05:13), Max: 97.9 (01-17-20 @ 22:24)  HR: 70 (01-18-20 @ 05:13)  BP: 106/57 (01-18-20 @ 05:13)  RR: 18 (01-18-20 @ 05:13)  SpO2: 99% (01-18-20 @ 05:13)      01-17 @ 07:01  -  01-18 @ 07:00  --------------------------------------------------------  IN: 900 mL / OUT: 1600 mL / NET: -700 mL          PHYSICAL EXAM:  Constitutional: NAD  HEENT: anicteric sclera, oropharynx clear, MMM  Neck: No JVD  Respiratory: CTAB, no wheezes, rales or rhonchi  Cardiovascular: S1, S2, RRR  Gastrointestinal: BS+, soft, NT/ND  Extremities: No cyanosis or clubbing. bilateral BKA   :  No brown.   Skin: No rashes    LABS:  01-18    136  |  110  |  59<H>  ----------------------------<  112<H>  4.5   |  13<L>  |  4.6<HH>    Ca    7.2<L>      18 Jan 2020 06:48  Phos  7.9     01-18  Mg     1.9     01-18    TPro  3.6<L>  /  Alb  1.1<L>  /  TBili  <0.2  /  DBili      /  AST  9   /  ALT  7   /  AlkPhos  169<H>  01-18    Iron with Total Binding Capacity (11.27.19 @ 06:17)    % Saturation, Iron: 30 %    Iron - Total Binding Capacity.: 89 ug/dL    Iron Total, Serum: 27 ug/dL    Unsaturated Iron Binding Capacity: 62 ug/dL    Ferritin, Serum: 679 ng/mL (11.27.19 @ 06:17)                            7.5    11.44 )-----------( 253      ( 18 Jan 2020 06:48 )             23.9       Urine Studies:      RADIOLOGY & ADDITIONAL STUDIES:

## 2020-01-18 NOTE — PROGRESS NOTE ADULT - ASSESSMENT
follow dr lantigua;s plan as per hypothyroidism. patient does not have hypoglycemia so does not need fingersticks. THE " low" fingersticks are inaccurate giving falsely low results. in summary no hypoglycemia, no fingersticks.

## 2020-01-18 NOTE — PROGRESS NOTE ADULT - SUBJECTIVE AND OBJECTIVE BOX
LENGTH OF HOSPITAL STAY: 8d    CHIEF COMPLAINT:   Patient is a 59y old  Male who presents with a chief complaint of SILVINA on CKD (17 Jan 2020 11:24)      Overnight events:    No acute events overnight    ALLERGIES:  sulfamethizole (Unknown)    MEDICATIONS:  STANDING MEDICATIONS  amitriptyline 25 milliGRAM(s) Oral at bedtime  calcium acetate 667 milliGRAM(s) Oral three times a day with meals  cefpodoxime 200 milliGRAM(s) Oral daily  chlorhexidine 4% Liquid 1 Application(s) Topical <User Schedule>  cholecalciferol 2000 Unit(s) Oral daily  collagenase Ointment 1 Application(s) Topical two times a day  Dakins Solution - 1/2 Strength 1 Application(s) Topical two times a day  doxycycline hyclate Capsule 100 milliGRAM(s) Oral every 12 hours  heparin  Injectable 5000 Unit(s) SubCutaneous every 12 hours  hydrocortisone 10 milliGRAM(s) Oral daily  influenza   Vaccine 0.5 milliLiter(s) IntraMuscular once  levothyroxine 100 MICROGram(s) Oral daily  megestrol 20 milliGRAM(s) Oral three times a day  methadone    Tablet 80 milliGRAM(s) Oral every 8 hours  metroNIDAZOLE    Tablet 500 milliGRAM(s) Oral two times a day  midodrine. 2.5 milliGRAM(s) Oral three times a day  mupirocin 2% Nasal 1 Application(s) Nasal two times a day  oxybutynin 10 milliGRAM(s) Oral two times a day  oxyCODONE    IR 30 milliGRAM(s) Oral every 8 hours  polyethylene glycol 3350 17 Gram(s) Oral at bedtime  sevelamer carbonate 800 milliGRAM(s) Oral three times a day with meals  sodium bicarbonate 1300 milliGRAM(s) Oral three times a day  sodium chloride 0.9%. 1000 milliLiter(s) IV Continuous <Continuous>    PRN MEDICATIONS  acetaminophen   Tablet .. 650 milliGRAM(s) Oral every 6 hours PRN    VITALS:   T(F): 96.5  HR: 70  BP: 106/57  RR: 18  SpO2: 99%    LABS:                        7.5    11.44 )-----------( 253      ( 18 Jan 2020 06:48 )             23.9     01-18    136  |  110  |  59<H>  ----------------------------<  112<H>  4.5   |  13<L>  |  x     Ca    7.2<L>      18 Jan 2020 06:48  Phos  7.9     01-18  Mg     1.9     01-18    TPro  3.6<L>  /  Alb  1.1<L>  /  TBili  <0.2  /  DBili  x   /  AST  9   /  ALT  7   /  AlkPhos  169<H>  01-18        ABG - ( 17 Jan 2020 14:30 )  pH, Arterial: 7.24  pH, Blood: x     /  pCO2: 37    /  pO2: 48    / HCO3: 16    / Base Excess: -10.6 /  SaO2: 81                    Culture - Blood (collected 16 Jan 2020 07:24)  Source: .Blood None  Preliminary Report (17 Jan 2020 14:01):    No growth to date.    Culture - Blood (collected 16 Jan 2020 07:24)  Source: .Blood None  Preliminary Report (17 Jan 2020 14:01):    No growth to date.    Culture - Blood (collected 15 Toni 2020 15:45)  Source: .Blood Blood  Preliminary Report (16 Jan 2020 23:02):    No growth to date.            Cultures:    Culture - Blood (collected 16 Jan 2020 07:24)  Source: .Blood None  Preliminary Report (17 Jan 2020 14:01):    No growth to date.    Culture - Blood (collected 16 Jan 2020 07:24)  Source: .Blood None  Preliminary Report (17 Jan 2020 14:01):    No growth to date.    Culture - Blood (collected 15 Toni 2020 15:45)  Source: .Blood Blood  Preliminary Report (16 Jan 2020 23:02):    No growth to date.        RADIOLOGY:    PHYSICAL EXAM:  GEN: No acute distress  HEENT:   LUNGS: Clear to auscultation bilaterally   HEART: S1/S2 present. RRR.   ABD: Soft, non-tender, non-distended. Bowel sounds present  EXT:  NEURO: [id number]

## 2020-01-18 NOTE — PROGRESS NOTE ADULT - ASSESSMENT
59 years old male known to have PVD s/p right side AKA and left below knee amputation, paraplegia with multiple chronic bilateral buttock and sacral stage 4 ulcers, suprapubic catheter, HTN (not on any medication), CKD stage IV (not following with nephrology), hypothyroidism, opioid dependance, recent admission for cold left foot (chronic left foot dry gangrene, h/o esophageal candidiasis was sent to the rehab due to worsening kidney functions and hypoglycemia with FS 45.    In the ED, pt remained hemodynamically stable but looks very pale, no active bleeding, lab workup showed leukocytosis of 19, normo anemia with Hb 7.7 (vbg 8.7), potassium 6.5 (hemolyzed) repeat 5.2, anion gap metabolic acidosis, worsening CKD 4 with creat 4.1 (baseline 3.5), , cxr ok.    # Septic shock - improved  - Had Fever of 101, hypotensive to the 60s systolic, blood pressure perked up after NS bolus and PRBC. lactate 1.4  - Source likely UTI, as UA is positive, Urine culture is pending  - Today hemodynamically stable, blood cultures negative  - Had Leukocytosis 27,000 trending down > 18,000 >[id number]  - Was on NS at 75 cc/hr, dced today  - PO antibiotics to Vantin, doxy and flagyl untill 1/20    # CKD 4 - Creatinine stable around 4.4:  - Creatinine stable around 4.4 , no indication for RRT  - Nephro is following, will f/u OP,   - Pt refused hemodialysis    # Hyperkalemia: resolved    # Metabolic acidosis: Non anion gap metabolic acidosis, bicarb 13, non worsening.  - increased sodium bicarb to 1300 q 8    # Behavioral abnormalities  Psych eval noted: patient deemed not suicidal and can make his own decisions  - He signed AMA on 1/11 but did not leave  - Seek more pain meds, already on methadone 80 q8 and oxycodone IR 30 q8. No more pain meds    # Acute normo anemia  no active bleed  keep active type and screen  transfuse to keep Hb > 7  Iron studies from Nov 2019 shows anemia of chronic disease + iron def  - Will need LINDY but not venofer    #  PVD s/p right side AKA and left below knee amputation  - incision sites look clean  - called vascular for the removal of staples    # hypothyroidism  - c/w levothyroxine 100. TSH 14 (going down)  - OP f/u with endo    # h/o esophageal candidiasis  - was on fluconazole,   outpt GI fu    # opioid dependance  cw methadone, 80 q8 and Oxycodone 30mg q8  outpt clinic fu on discharge    # HTN (not on any medication)    # suspected folate / vit D / Vit b12 DEF  c/w cholecalceferol    # Pt looks malnourished  - Dietician eval noted  - started megace    # Sacral and buttock pressure sores  - Burn eval:  Healing sacral and buttock pressure sores--> no infection  - soap and water qd, santyl ointment and dakin solution wet to dry dressing , ABD pad dressing change qd  - no surgery needed    # No evidence of adrenal insufficiency  - Work-up last month ruled out the usual endocrine causes (cosyntropin test was normal, insulin levels were low-normal when glucose was low  - taper and discontinue  hydrocortisone        PLAN: f/u Urine Cx, , Vitamin D, Mag, lactate, phosp, K    DVT PPX: heparin  GI PPx: not indicated  Dispo: Requesting home services. ACUTE  Diet: K restricted  Activity: OOBTC.  CODE: DNR/DNI/no feeding tubes/no hemodialysis  Patient requested Hospice -

## 2020-01-19 NOTE — CHART NOTE - NSCHARTNOTEFT_GEN_A_CORE
Removed staples from LLE stump. Incision was well healed and staples were easily removed. Patient tolerated the procedure well.    Please recall vascular surgery PRN.

## 2020-01-19 NOTE — PROGRESS NOTE ADULT - SUBJECTIVE AND OBJECTIVE BOX
Nephrology progress note    Patient is seen and examined, events over the last 24 h noted .    Allergies:  sulfamethizole (Unknown)    Hospital Medications:   MEDICATIONS  (STANDING):  amitriptyline 25 milliGRAM(s) Oral at bedtime  calcium acetate 667 milliGRAM(s) Oral three times a day with meals  cefpodoxime 200 milliGRAM(s) Oral daily  chlorhexidine 4% Liquid 1 Application(s) Topical <User Schedule>  cholecalciferol 2000 Unit(s) Oral daily  collagenase Ointment 1 Application(s) Topical two times a day  Dakins Solution - 1/2 Strength 1 Application(s) Topical two times a day  dextrose 5% 1000 milliLiter(s) (100 mL/Hr) IV Continuous <Continuous>  doxycycline hyclate Capsule 100 milliGRAM(s) Oral every 12 hours  heparin  Injectable 5000 Unit(s) SubCutaneous every 12 hours  hydrocortisone 10 milliGRAM(s) Oral daily  influenza   Vaccine 0.5 milliLiter(s) IntraMuscular once  levothyroxine 100 MICROGram(s) Oral daily  megestrol 20 milliGRAM(s) Oral three times a day  methadone    Tablet 80 milliGRAM(s) Oral every 8 hours  metroNIDAZOLE    Tablet 500 milliGRAM(s) Oral two times a day  midodrine. 2.5 milliGRAM(s) Oral three times a day  mupirocin 2% Nasal 1 Application(s) Nasal two times a day  oxybutynin 10 milliGRAM(s) Oral two times a day  oxyCODONE    IR 30 milliGRAM(s) Oral every 8 hours  polyethylene glycol 3350 17 Gram(s) Oral at bedtime  sevelamer carbonate 800 milliGRAM(s) Oral three times a day with meals  sodium bicarbonate 1300 milliGRAM(s) Oral three times a day        VITALS:  T(F): 98.7 (01-19-20 @ 04:56), Max: 98.7 (01-18-20 @ 22:25)  HR: 73 (01-19-20 @ 04:56)  BP: 119/66 (01-19-20 @ 04:56)  RR: 18 (01-19-20 @ 04:56)  SpO2: 96% (01-19-20 @ 04:56)  Wt(kg): --    01-17 @ 07:01  -  01-18 @ 07:00  --------------------------------------------------------  IN: 900 mL / OUT: 1600 mL / NET: -700 mL    01-18 @ 07:01  -  01-19 @ 07:00  --------------------------------------------------------  IN: 0 mL / OUT: 2675 mL / NET: -2675 mL          PHYSICAL EXAM:  Constitutional: NAD  HEENT: anicteric sclera, oropharynx clear, MMM  Neck: No JVD  Respiratory: CTAB, no wheezes, rales or rhonchi  Cardiovascular: S1, S2, RRR  Gastrointestinal: BS+, soft, NT/ND  Extremities: No cyanosis or clubbing. No peripheral edema  :  No brown.   Skin: No rashes    LABS:  01-18    136  |  110  |  59<H>  ----------------------------<  112<H>  4.5   |  13<L>  |  4.6<HH>    Creatinine Trend: 4.6<--, 4.5<--, 4.5<--, 4.3<--, 4.4<--, 4.3<--    Ca    7.2<L>      18 Jan 2020 06:48  Phos  7.9     01-18  Mg     1.9     01-18    TPro  3.6<L>  /  Alb  1.1<L>  /  TBili  <0.2  /  DBili      /  AST  9   /  ALT  7   /  AlkPhos  169<H>  01-18                          7.5    11.44 )-----------( 253      ( 18 Jan 2020 06:48 )             23.9       Urine Studies:      RADIOLOGY & ADDITIONAL STUDIES: Nephrology progress note    Patient is seen and examined, events over the last 24 h noted .  no new complaints.    Allergies:  sulfamethizole (Unknown)    Hospital Medications:   MEDICATIONS  (STANDING):  amitriptyline 25 milliGRAM(s) Oral at bedtime  calcium acetate 667 milliGRAM(s) Oral three times a day with meals  cefpodoxime 200 milliGRAM(s) Oral daily  chlorhexidine 4% Liquid 1 Application(s) Topical <User Schedule>  cholecalciferol 2000 Unit(s) Oral daily  collagenase Ointment 1 Application(s) Topical two times a day  Dakins Solution - 1/2 Strength 1 Application(s) Topical two times a day  dextrose 5% 1000 milliLiter(s) (100 mL/Hr) IV Continuous <Continuous>  doxycycline hyclate Capsule 100 milliGRAM(s) Oral every 12 hours  heparin  Injectable 5000 Unit(s) SubCutaneous every 12 hours  hydrocortisone 10 milliGRAM(s) Oral daily  influenza   Vaccine 0.5 milliLiter(s) IntraMuscular once  levothyroxine 100 MICROGram(s) Oral daily  megestrol 20 milliGRAM(s) Oral three times a day  methadone    Tablet 80 milliGRAM(s) Oral every 8 hours  metroNIDAZOLE    Tablet 500 milliGRAM(s) Oral two times a day  midodrine. 2.5 milliGRAM(s) Oral three times a day  mupirocin 2% Nasal 1 Application(s) Nasal two times a day  oxybutynin 10 milliGRAM(s) Oral two times a day  oxyCODONE    IR 30 milliGRAM(s) Oral every 8 hours  polyethylene glycol 3350 17 Gram(s) Oral at bedtime  sevelamer carbonate 800 milliGRAM(s) Oral three times a day with meals  sodium bicarbonate 1300 milliGRAM(s) Oral three times a day        VITALS:  T(F): 98.7 (01-19-20 @ 04:56), Max: 98.7 (01-18-20 @ 22:25)  HR: 73 (01-19-20 @ 04:56)  BP: 119/66 (01-19-20 @ 04:56)  RR: 18 (01-19-20 @ 04:56)  SpO2: 96% (01-19-20 @ 04:56)  Wt(kg): --    01-17 @ 07:01  -  01-18 @ 07:00  --------------------------------------------------------  IN: 900 mL / OUT: 1600 mL / NET: -700 mL    01-18 @ 07:01  -  01-19 @ 07:00  --------------------------------------------------------  IN: 0 mL / OUT: 2675 mL / NET: -2675 mL          PHYSICAL EXAM:  Constitutional: NAD  HEENT: anicteric sclera, oropharynx clear, MMM  Neck: No JVD  Respiratory: CTAB, no wheezes, rales or rhonchi  Cardiovascular: S1, S2, RRR  Gastrointestinal: BS+, soft, NT/ND  Extremities: No cyanosis or clubbing. No peripheral edema  :  No brown.   Skin: No rashes    LABS:  01-18    136  |  110  |  59<H>  ----------------------------<  112<H>  4.5   |  13<L>  |  4.6<HH>    Creatinine Trend: 4.6<--, 4.5<--, 4.5<--, 4.3<--, 4.4<--, 4.3<--    Ca    7.2<L>      18 Jan 2020 06:48  Phos  7.9     01-18  Mg     1.9     01-18    TPro  3.6<L>  /  Alb  1.1<L>  /  TBili  <0.2  /  DBili      /  AST  9   /  ALT  7   /  AlkPhos  169<H>  01-18                          7.5    11.44 )-----------( 253      ( 18 Jan 2020 06:48 )             23.9       Urine Studies:      RADIOLOGY & ADDITIONAL STUDIES:

## 2020-01-19 NOTE — PROGRESS NOTE ADULT - ASSESSMENT
a/p:  # Septic shock - resolved--likely 2/2 UTI  - f/u ucx--+UA  -cont oral abx (vantin/doxy/flagyl until tomorrow)  -dispo planning for possible hospice vs placement (patient lives alone)    # CKD 4 - Creatinine stable  -f/u with nephrology--today some discussion re possible need for eventual HD and patient was considering it??    # Metabolic acidosis: Non anion gap metabolic acidosis, bicarb 13, non worsening.  - cont sodium bicarb to 1300 q 8    # Behavioral abnormalities  Psych eval noted: patient deemed not suicidal and can make his own decisions  - He signed AMA on 1/11 but did not leave  -avoid more pain meds, already on methadone 80 q8 and oxycodone IR 30 q8    #  PVD s/p right side AKA and left below knee amputation    # hypothyroidism  - c/w levothyroxine 100. TSH 14 (going down)  - OP f/u with endo    # opioid dependance  cw methadone 80 q8 and Oxycodone 30mg q8  outpt clinic fu on discharge    # suspected folate / vit D / Vit b12 DEF  c/w cholecalceferol    # Pt looks malnourished  - Dietician eval noted  - started megace    # Sacral and buttock pressure sores  - Burn eval:  Healing sacral and buttock pressure sores--> no infection-position change  - soap and water qd, santyl ointment and dakin solution wet to dry dressing , ABD pad dressing change qd    DVT/GI ppx  guarded prognosis

## 2020-01-19 NOTE — PROGRESS NOTE ADULT - SUBJECTIVE AND OBJECTIVE BOX
Patient is a 59y old  Male who presents with a chief complaint of hypothyroidism (18 Jan 2020 15:09)    HPI:  59 years old male known to have PVD s/p right side AKA and left below knee amputation, paraplegia with multiple chronic bilateral buttock and sacral stage 4 ulcers, suprapubic catheter, HTN (not on any medication), CKD stage IV (not following with nephrology), hypothyroidism, opioid dependance, recent admission for cold left foot (chronic left foot dry gangrene, h/o esophageal candidiasis was sent to the rehab due to worsening kidney functions and hypoglycemia with FS 45.  Pt only states he had cloudy urine for past few days. states even if needed he will not go for hemodialysis. (10 Toni 2020 21:52)    PAST MEDICAL & SURGICAL HISTORY:  Anemia  PAD (peripheral artery disease)  Hypertension  Suprapubic catheter  Pressure ulcer  Diabetes  Lumbar compression fracture  S/P below knee amputation, left  S/P below knee amputation, right  S/P debridement  Toe amputation status, right  H/O hernia repair    patient seen and examined independently on morning rounds, chart reviewed and discussed with the medicine resident on call    no overnight events--dispo planning             Vital Signs Last 24 Hrs  T(C): 37 (19 Jan 2020 13:20), Max: 37.1 (18 Jan 2020 22:25)  T(F): 98.6 (19 Jan 2020 13:20), Max: 98.7 (18 Jan 2020 22:25)  HR: 82 (19 Jan 2020 13:20) (73 - 82)  BP: 151/112 (19 Jan 2020 13:20) (119/66 - 151/112)  BP(mean): --  RR: 20 (19 Jan 2020 13:20) (18 - 20)  SpO2: 97% (19 Jan 2020 13:20) (96% - 98%)                        7.2    9.43  )-----------( 238      ( 19 Jan 2020 07:59 )             22.8     01-19    137  |  108  |  58<H>  ----------------------------<  94  4.2   |  17  |  4.4<HH>    Ca    7.3<L>      19 Jan 2020 07:59  Phos  7.7     01-19  Mg     1.7     01-19    TPro  3.8<L>  /  Alb  1.3<L>  /  TBili  <0.2  /  DBili  x   /  AST  8   /  ALT  6   /  AlkPhos  157<H>  01-19            Culture - Urine (collected 16 Jan 2020 16:15)  Source: .Urine Clean Catch (Midstream)  Final Report (19 Jan 2020 07:41):    <10,000 CFU/mL Normal Urogenital Lyubov        MEDICATIONS  (STANDING):  amitriptyline 25 milliGRAM(s) Oral at bedtime  calcium acetate 667 milliGRAM(s) Oral three times a day with meals  cefpodoxime 200 milliGRAM(s) Oral daily  chlorhexidine 4% Liquid 1 Application(s) Topical <User Schedule>  cholecalciferol 2000 Unit(s) Oral daily  collagenase Ointment 1 Application(s) Topical two times a day  Dakins Solution - 1/2 Strength 1 Application(s) Topical two times a day  dextrose 5% 1000 milliLiter(s) (100 mL/Hr) IV Continuous <Continuous>  doxycycline hyclate Capsule 100 milliGRAM(s) Oral every 12 hours  heparin  Injectable 5000 Unit(s) SubCutaneous every 12 hours  hydrocortisone 10 milliGRAM(s) Oral daily  influenza   Vaccine 0.5 milliLiter(s) IntraMuscular once  levothyroxine 100 MICROGram(s) Oral daily  megestrol 20 milliGRAM(s) Oral three times a day  methadone    Tablet 80 milliGRAM(s) Oral every 8 hours  metroNIDAZOLE    Tablet 500 milliGRAM(s) Oral two times a day  midodrine. 2.5 milliGRAM(s) Oral three times a day  mupirocin 2% Nasal 1 Application(s) Nasal two times a day  oxybutynin 10 milliGRAM(s) Oral two times a day  oxyCODONE    IR 30 milliGRAM(s) Oral every 8 hours  polyethylene glycol 3350 17 Gram(s) Oral at bedtime  sevelamer carbonate 800 milliGRAM(s) Oral three times a day with meals  sodium bicarbonate 1300 milliGRAM(s) Oral three times a day  vancomycin  IVPB

## 2020-01-19 NOTE — PROGRESS NOTE ADULT - ASSESSMENT
59 years old male known to have PVD s/p right side AKA and left below knee amputation, paraplegia with multiple chronic bilateral buttock and sacral stage 4 ulcers, suprapubic catheter, HTN (not on any medication), CKD stage IV (not following with nephrology), hypothyroidism, opioid dependance severe ASPVD presenting with Acidosis  ·	CKD 4 / creat stable, no further renal work up  ·	corrected Ca at goal.  ·	Phosphorus noted please increase phoslo to 2 tabs po qac   ·	Acidosis probably related to CKD / continue sodium bicarb 1300 mg po q8h  ·	start D5w with 3 amps sodium bicarb (150 meq) at 100 cc per hour   ·	Anemia chronic / previous  Fe studies noted / repeat  start Aranesp 20 mcg sq qweek   ·	as per chart noted patient to be on hospice palliative and no HD     will follow for one more visit 59 years old male known to have PVD s/p right side AKA and left below knee amputation, paraplegia with multiple chronic bilateral buttock and sacral stage 4 ulcers, suprapubic catheter, HTN (not on any medication), CKD stage IV (not following with nephrology), hypothyroidism, opioid dependance severe ASPVD presenting with Acidosis  ·	CKD 4 / creat slowly trending up  ·	corrected Ca at goal.  ·	Phosphorus noted please increase phoslo to 2 tabs po qac   ·	Acidosis probably related to CKD / continue sodium bicarb 1300 mg po q8h  ·	cont. D5w with 3 amps sodium bicarb (150 meq) at 100 cc per hour   ·	monitor I/O  ·	Anemia chronic / previous  Fe studies noted / repeat  start Aranesp 20 mcg sq qweek   ·	pt considering hospice care. had detailed discussion with pt about dialysis. Pt will think about it.    will follow

## 2020-01-20 NOTE — PROGRESS NOTE ADULT - SUBJECTIVE AND OBJECTIVE BOX
59 years old male known to have PVD s/p right side AKA and left below knee amputation, paraplegia with multiple chronic bilateral buttock and sacral stage 4 ulcers, suprapubic catheter, HTN (not on any medication), CKD stage IV (not following with nephrology), hypothyroidism, opioid dependance, recent admission for cold left foot (chronic left foot dry gangrene, h/o esophageal candidiasis was sent to the rehab due to worsening kidney functions and hypoglycemia with FS 45.  In the ED, pt remained hemodynamically stable but looks very pale, no active bleeding, lab workup showed leukocytosis of 19, normo anemia with Hb 7.7 (vbg 8.7), potassium 6.5 (hemolyzed) repeat 5.2, anion gap metabolic acidosis, worsening CKD 4 with creat 4.1 (baseline 3.5), , cxr ok.  Pt was consulted by nephrology today, no HD recommended at this time, will d/c bicarb drip, pt is refusing, noncompliant, c/w po bicarbonate.   Blood Cx came back positive for MRSA from 1/15, will c/w Doxycycline, start contact isolation, call ID for a follow up.   Today pt denies pain, feels frustrated due to the fact that he is stuck in the hospital and all his IVs are blowing up, asking about discharge.     PAST MEDICAL & SURGICAL HISTORY:  Anemia  PAD (peripheral artery disease)  Hypertension  Suprapubic catheter  Pressure ulcer  Diabetes  Lumbar compression fracture  S/P below knee amputation, left  S/P below knee amputation, right  S/P debridement  Toe amputation status, right  H/O hernia repair      Vital Signs Last 24 Hrs  T(C): 37 (20 Jan 2020 05:58), Max: 37 (19 Jan 2020 13:20)  T(F): 98.6 (20 Jan 2020 05:58), Max: 98.6 (19 Jan 2020 13:20)  HR: 79 (20 Jan 2020 05:58) (79 - 82)  BP: 102/59 (20 Jan 2020 05:58) (102/59 - 151/112)  BP(mean): --  RR: 20 (20 Jan 2020 05:58) (18 - 20)  SpO2: 97% (20 Jan 2020 05:58) (97% - 97%)    Physical exam:  GENERAL: AAOx3, NAD, pale   NECK: supple, no JVD   CV: S1,S2   Lungs: decreased BS at bases   ABd/ GI: soft, NT,ND, BS present   Extr: no edema on UE, Rt AKA, lt BKA     LABS:                                     7.2    9.43  )-----------( 238      ( 19 Jan 2020 07:59 )             22.8   01-19    137  |  108  |  58<H>  ----------------------------<  94  4.2   |  17  |  4.4<HH>    Ca    7.3<L>      19 Jan 2020 07:59  Phos  7.7     01-19  Mg     1.7     01-19    TPro  3.8<L>  /  Alb  1.3<L>  /  TBili  <0.2  /  DBili  x   /  AST  8   /  ALT  6   /  AlkPhos  157<H>  01-19    Culture - Urine (collected 16 Jan 2020 16:15)  Source: .Urine Clean Catch (Midstream)  Final Report (19 Jan 2020 07:41):    <10,000 CFU/mL Normal Urogenital Lyubov    Culture - Blood (01.15.20 @ 15:45)    Gram Stain:   Growth in anaerobic bottle: Gram positive cocci in pairs, chains and  clusters    -  Methicillin resistant Staphylococcus aureus (MRSA): Detec    Specimen Source: .Blood Blood    Organism: Blood Culture PCR    Culture Results:   Growth in anaerobic bottle: Staphylococcus aureus  ***Blood Panel PCR results on this specimen are available  approximately 3 hours after the Gram stain result.***  Gram stain, PCR, and/or culture results may not always  correspond due to difference in methodologies.  ************************************************************  This PCR assay was performed using i3 membrane.  The following targets are tested for: Enterococcus,  vancomycin resistant enterococci, Listeria monocytogenes,  coagulase negative staphylococci, S. aureus,  methicillin resistant S. aureus, Streptococcus agalactiae  (Group B), S. pneumoniae, S. pyogenes (Group A),  Acinetobacter baumannii, Enterobacter cloacae, E. coli,  Klebsiella oxytoca, K. pneumoniae, Proteus sp.,  Serratia marcescens, Haemophilus influenzae,  Neisseria meningitidis, Pseudomonas aeruginosa, Candida  albicans, C. glabrata, C krusei, C parapsilosis,  C. tropicalis and the KPC resistance gene.    Organism Identification: Blood Culture PCR    Method Type: PCR    Culture - Blood (01.16.20 @ 07:24)    Specimen Source: .Blood None    Culture Results:   No growth to date.    RADIOLOGY:    < from: Xray Chest 1 View AP/PA (01.10.20 @ 14:04) >  Impression:      No consolidation, effusion or pneumothorax.        MEDICATIONS  (STANDING):  amitriptyline 25 milliGRAM(s) Oral at bedtime  calcium acetate 1334 milliGRAM(s) Oral three times a day with meals  chlorhexidine 4% Liquid 1 Application(s) Topical <User Schedule>  cholecalciferol 2000 Unit(s) Oral daily  collagenase Ointment 1 Application(s) Topical two times a day  Dakins Solution - 1/2 Strength 1 Application(s) Topical two times a day  darbepoetin Injectable ViaL 20 MICROGram(s) SubCutaneous every week  heparin  Injectable 5000 Unit(s) SubCutaneous every 12 hours  hydrocortisone 10 milliGRAM(s) Oral daily  influenza   Vaccine 0.5 milliLiter(s) IntraMuscular once  levothyroxine 100 MICROGram(s) Oral daily  megestrol 20 milliGRAM(s) Oral three times a day  methadone    Tablet 80 milliGRAM(s) Oral every 8 hours  metroNIDAZOLE    Tablet 500 milliGRAM(s) Oral two times a day  midodrine. 2.5 milliGRAM(s) Oral three times a day  mupirocin 2% Nasal 1 Application(s) Nasal two times a day  oxybutynin 10 milliGRAM(s) Oral two times a day  oxyCODONE    IR 30 milliGRAM(s) Oral every 8 hours  polyethylene glycol 3350 17 Gram(s) Oral at bedtime  sevelamer carbonate 800 milliGRAM(s) Oral three times a day with meals  sodium bicarbonate 1300 milliGRAM(s) Oral three times a day  vancomycin  IVPB      vancomycin  IVPB 1000 milliGRAM(s) IV Intermittent every 24 hours    MEDICATIONS  (PRN):  acetaminophen   Tablet .. 650 milliGRAM(s) Oral every 6 hours PRN Temp greater or equal to 38.5C (101.3F), Mild Pain (1 - 3)  diazepam    Tablet 5 milliGRAM(s) Oral two times a day PRN anxiety

## 2020-01-20 NOTE — PROGRESS NOTE ADULT - SUBJECTIVE AND OBJECTIVE BOX
LENGTH OF HOSPITAL STAY: 10d    CHIEF COMPLAINT:   Patient is a 59y old  Male who presents with a chief complaint of septic shock (19 Jan 2020 15:49)      Overnight events:    No acute events overnight    ALLERGIES:  sulfamethizole (Unknown)    MEDICATIONS:  STANDING MEDICATIONS  amitriptyline 25 milliGRAM(s) Oral at bedtime  calcium acetate 1334 milliGRAM(s) Oral three times a day with meals  chlorhexidine 4% Liquid 1 Application(s) Topical <User Schedule>  cholecalciferol 2000 Unit(s) Oral daily  collagenase Ointment 1 Application(s) Topical two times a day  Dakins Solution - 1/2 Strength 1 Application(s) Topical two times a day  darbepoetin Injectable ViaL 20 MICROGram(s) SubCutaneous every week  heparin  Injectable 5000 Unit(s) SubCutaneous every 12 hours  hydrocortisone 10 milliGRAM(s) Oral daily  influenza   Vaccine 0.5 milliLiter(s) IntraMuscular once  levothyroxine 100 MICROGram(s) Oral daily  megestrol 20 milliGRAM(s) Oral three times a day  methadone    Tablet 80 milliGRAM(s) Oral every 8 hours  metroNIDAZOLE    Tablet 500 milliGRAM(s) Oral two times a day  midodrine. 2.5 milliGRAM(s) Oral three times a day  mupirocin 2% Nasal 1 Application(s) Nasal two times a day  oxybutynin 10 milliGRAM(s) Oral two times a day  oxyCODONE    IR 30 milliGRAM(s) Oral every 8 hours  polyethylene glycol 3350 17 Gram(s) Oral at bedtime  sevelamer carbonate 800 milliGRAM(s) Oral three times a day with meals  sodium bicarbonate 1300 milliGRAM(s) Oral three times a day  vancomycin  IVPB      vancomycin  IVPB 1000 milliGRAM(s) IV Intermittent every 24 hours    PRN MEDICATIONS  acetaminophen   Tablet .. 650 milliGRAM(s) Oral every 6 hours PRN  diazepam    Tablet 5 milliGRAM(s) Oral two times a day PRN    VITALS:   T(F): 98.6  HR: 79  BP: 102/59  RR: 20  SpO2: 97%    LABS:                        7.2    9.43  )-----------( 238      ( 19 Jan 2020 07:59 )             22.8     01-19    137  |  108  |  58<H>  ----------------------------<  94  4.2   |  17  |  4.4<HH>    Ca    7.3<L>      19 Jan 2020 07:59  Phos  7.7     01-19  Mg     1.7     01-19    TPro  3.8<L>  /  Alb  1.3<L>  /  TBili  <0.2  /  DBili  x   /  AST  8   /  ALT  6   /  AlkPhos  157<H>  01-19                    Cultures:      RADIOLOGY:    PHYSICAL EXAM:  GEN: No acute distress  HEENT: SUJIT  LUNGS: Clear to auscultation bilaterally   HEART: S1/S2 present. RRR.   ABD: Soft, non-tender, non-distended. Bowel sounds present  EXT:  NEURO: AAOX3 LENGTH OF HOSPITAL STAY: 10d    CHIEF COMPLAINT:   Patient is a 59y old  Male who presents with a chief complaint of septic shock (19 Jan 2020 15:49)      Overnight events:    No acute events overnight    ALLERGIES:  sulfamethizole (Unknown)    MEDICATIONS:  STANDING MEDICATIONS  amitriptyline 25 milliGRAM(s) Oral at bedtime  calcium acetate 1334 milliGRAM(s) Oral three times a day with meals  chlorhexidine 4% Liquid 1 Application(s) Topical <User Schedule>  cholecalciferol 2000 Unit(s) Oral daily  collagenase Ointment 1 Application(s) Topical two times a day  Dakins Solution - 1/2 Strength 1 Application(s) Topical two times a day  darbepoetin Injectable ViaL 20 MICROGram(s) SubCutaneous every week  heparin  Injectable 5000 Unit(s) SubCutaneous every 12 hours  hydrocortisone 10 milliGRAM(s) Oral daily  influenza   Vaccine 0.5 milliLiter(s) IntraMuscular once  levothyroxine 100 MICROGram(s) Oral daily  megestrol 20 milliGRAM(s) Oral three times a day  methadone    Tablet 80 milliGRAM(s) Oral every 8 hours  metroNIDAZOLE    Tablet 500 milliGRAM(s) Oral two times a day  midodrine. 2.5 milliGRAM(s) Oral three times a day  mupirocin 2% Nasal 1 Application(s) Nasal two times a day  oxybutynin 10 milliGRAM(s) Oral two times a day  oxyCODONE    IR 30 milliGRAM(s) Oral every 8 hours  polyethylene glycol 3350 17 Gram(s) Oral at bedtime  sevelamer carbonate 800 milliGRAM(s) Oral three times a day with meals  sodium bicarbonate 1300 milliGRAM(s) Oral three times a day  vancomycin  IVPB      vancomycin  IVPB 1000 milliGRAM(s) IV Intermittent every 24 hours    PRN MEDICATIONS  acetaminophen   Tablet .. 650 milliGRAM(s) Oral every 6 hours PRN  diazepam    Tablet 5 milliGRAM(s) Oral two times a day PRN    VITALS:   T(F): 98.6  HR: 79  BP: 102/59  RR: 20  SpO2: 97%    LABS:                        7.2    9.43  )-----------( 238      ( 19 Jan 2020 07:59 )             22.8     01-19    137  |  108  |  58<H>  ----------------------------<  94  4.2   |  17  |  4.4<HH>    Ca    7.3<L>      19 Jan 2020 07:59  Phos  7.7     01-19  Mg     1.7     01-19    TPro  3.8<L>  /  Alb  1.3<L>  /  TBili  <0.2  /  DBili  x   /  AST  8   /  ALT  6   /  AlkPhos  157<H>  01-19                    Cultures:      RADIOLOGY:    PHYSICAL EXAM:  GEN: No acute distress, pale, NC/AT  LUNGS: Clear to auscultation bilaterally, no wheezes   HEART: S1/S2 present. RRR. No murmurs  ABD: Soft, non-tender, non-distended. Bowel sounds present  EXT: L BKA, R AKA. Sacral ulcers and lower back ulcer, foul smelling with surrounding erythema  NEURO: AAOX3    Intravenous access: No IV access  NG tube: None  Posadas Catheter: None

## 2020-01-20 NOTE — PROGRESS NOTE ADULT - SUBJECTIVE AND OBJECTIVE BOX
MARGE BELLA  59y, Male  Allergy: sulfamethizole (Unknown)      CHIEF COMPLAINT: Septic shock (20 Jan 2020 10:48)      INTERVAL EVENTS/HPI  - No acute events overnight, BCX + MRSA  - T(F): , Max: 98.6 (01-19-20 @ 13:20)  - Denies any worsening symptoms  - Tolerating medication  - WBC Count: 9.43 (01-19-20 @ 07:59)  WBC Count: 11.44 (01-18-20 @ 06:48)  - Creatinine, Serum: 4.4 (01-19-20 @ 07:59)       ROS  General: Denies rigors, nightsweats  HEENT: Denies headache, rhinorrhea, sore throat, eye pain  CV: Denies CP, palpitations  PULM: Denies wheezing, hemoptysis  GI: Denies hematemesis, hematochezia, melena  : Denies discharge, hematuria  MSK: Denies arthralgias, myalgias  SKIN: Denies rash, lesions  NEURO: Denies paresthesias, weakness  PSYCH: Denies depression, anxiety    VITALS:  T(F): 98.6, Max: 98.6 (01-19-20 @ 13:20)  HR: 79  BP: 102/59  RR: 20Vital Signs Last 24 Hrs  T(C): 37 (20 Jan 2020 05:58), Max: 37 (19 Jan 2020 13:20)  T(F): 98.6 (20 Jan 2020 05:58), Max: 98.6 (19 Jan 2020 13:20)  HR: 79 (20 Jan 2020 05:58) (79 - 82)  BP: 102/59 (20 Jan 2020 05:58) (102/59 - 151/112)  BP(mean): --  RR: 20 (20 Jan 2020 05:58) (18 - 20)  SpO2: 97% (20 Jan 2020 05:58) (97% - 97%)    PHYSICAL EXAM:  Gen: chronically ill appearing NAD, resting in bed  HEENT: Normocephalic, atraumatic  Neck: supple, no lymphadenopathy  CV: Regular rate & regular rhythm  Lungs: decreased BS at bases, no fremitus  Abdomen: Soft, BS present  Ext: Warm, well perfused, b/l AKA  Neuro: non focal, awake  Skin: sacral decubitus L ischial decub clean   Lines: no phlebitis    FH: Non-contributory  Social Hx: Non-contributory    TESTS & MEASUREMENTS:                        7.2    9.43  )-----------( 238      ( 19 Jan 2020 07:59 )             22.8     01-19    137  |  108  |  58<H>  ----------------------------<  94  4.2   |  17  |  4.4<HH>    Ca    7.3<L>      19 Jan 2020 07:59  Phos  7.7     01-19  Mg     1.7     01-19    TPro  3.8<L>  /  Alb  1.3<L>  /  TBili  <0.2  /  DBili  x   /  AST  8   /  ALT  6   /  AlkPhos  157<H>  01-19      LIVER FUNCTIONS - ( 19 Jan 2020 07:59 )  Alb: 1.3 g/dL / Pro: 3.8 g/dL / ALK PHOS: 157 U/L / ALT: 6 U/L / AST: 8 U/L / GGT: x               Culture - Urine (collected 01-16-20 @ 16:15)  Source: .Urine Clean Catch (Midstream)  Final Report (01-19-20 @ 07:41):    <10,000 CFU/mL Normal Urogenital Lyubov    Culture - Blood (collected 01-16-20 @ 07:24)  Source: .Blood None  Preliminary Report (01-17-20 @ 14:01):    No growth to date.    Culture - Blood (collected 01-16-20 @ 07:24)  Source: .Blood None  Preliminary Report (01-17-20 @ 14:01):    No growth to date.    Culture - Blood (collected 01-15-20 @ 15:45)  Source: .Blood Blood  Gram Stain (01-19-20 @ 11:20):    Growth in anaerobic bottle: Gram positive cocci in pairs, chains and    clusters  Preliminary Report (01-20-20 @ 09:17):    Growth in anaerobic bottle: Staphylococcus aureus    ***Blood Panel PCR results on this specimen are available    approximately 3 hours after the Gram stain result.***    Gram stain, PCR, and/or culture results may not always    correspond due to difference in methodologies.    ************************************************************    This PCR assay was performed using [employer].    The following targets are tested for: Enterococcus,    vancomycin resistant enterococci, Listeria monocytogenes,    coagulase negative staphylococci, S. aureus,    methicillin resistant S. aureus, Streptococcus agalactiae    (Group B), S. pneumoniae, S. pyogenes (Group A),    Acinetobacter baumannii, Enterobacter cloacae, E. coli,    Klebsiella oxytoca, K. pneumoniae, Proteus sp.,    Serratia marcescens, Haemophilus influenzae,    Neisseria meningitidis, Pseudomonas aeruginosa, Candida    albicans, C. glabrata, C krusei, C parapsilosis,    C. tropicalis and the KPC resistance gene.  Organism: Blood Culture PCR (01-19-20 @ 12:38)  Organism: Blood Culture PCR (01-19-20 @ 12:38)      -  Methicillin resistant Staphylococcus aureus (MRSA): Detec      Method Type: PCR        Blood Gas Venous - Lactate: 0.9 mmoL/L (01-17-20 @ 11:53)  Lactate, Blood: 1.4 mmol/L (01-15-20 @ 15:55)      INFECTIOUS DISEASES TESTING  MRSA PCR Result.: Positive (12-02-19 @ 18:50)      RADIOLOGY & ADDITIONAL TESTS:  I have personally reviewed the last Chest xray  CXR      CT      CARDIOLOGY TESTING  12 Lead ECG:   Ventricular Rate 97 BPM    Atrial Rate 97 BPM    P-R Interval 160 ms    QRS Duration 76 ms    Q-T Interval 354 ms    QTC Calculation(Bezet) 449 ms    P Axis 62 degrees    R Axis -17 degrees    T Axis 49 degrees    Diagnosis Line Normal sinus rhythm  Normal ECG    Confirmed by ELLIOT RIVERA MD ([id number]) on 1/10/2020 1:12:02 PM (01-10-20 @ 11:52)      MEDICATIONS  amitriptyline 25  calcium acetate 1334  chlorhexidine 4% Liquid 1  cholecalciferol 2000  collagenase Ointment 1  Dakins Solution - 1/2 Strength 1  darbepoetin Injectable ViaL 20  heparin  Injectable 5000  hydrocortisone 10  influenza   Vaccine 0.5  levothyroxine 100  megestrol 20  methadone    Tablet 80  metroNIDAZOLE    Tablet 500  midodrine. 2.5  mupirocin 2% Nasal 1  oxybutynin 10  oxyCODONE    IR 30  polyethylene glycol 3350 17  sevelamer carbonate 800  sodium bicarbonate 1300  vancomycin  IVPB   vancomycin  IVPB 1000      ANTIBIOTICS:  metroNIDAZOLE    Tablet 500 milliGRAM(s) Oral two times a day  vancomycin  IVPB      vancomycin  IVPB 1000 milliGRAM(s) IV Intermittent every 24 hours      All available historical records have been reviewed

## 2020-01-20 NOTE — PROGRESS NOTE ADULT - ASSESSMENT
59 years old male known to have PVD s/p right side AKA and left below knee amputation, paraplegia with multiple chronic bilateral buttock and sacral stage 4 ulcers, suprapubic catheter, HTN (not on any medication), CKD stage IV (not following with nephrology), hypothyroidism, opioid dependance, recent admission for cold left foot (chronic left foot dry gangrene, h/o esophageal candidiasis was sent to the rehab due to worsening kidney functions and hypoglycemia with FS 45.    In the ED, pt remained hemodynamically stable but looks very pale, no active bleeding, lab workup showed leukocytosis of 19, normo anemia with Hb 7.7 (vbg 8.7), potassium 6.5 (hemolyzed) repeat 5.2, anion gap metabolic acidosis, worsening CKD 4 with creat 4.1 (baseline 3.5), , cxr ok.    # Septic shock - Now vitally stable  - Had Fever of 101, hypotensive to the 60s systolic, blood pressure perked up after NS bolus and PRBC. lactate 1.4  - UA was positive but Urine culture is negative.  - Likely the source is sacral ulcer  - 1 blood culture on 1/15 was positive for MRSA. Next one on 1/16 negative  - Had Leukocytosis 27,000 trending down > 18K > 9K  - Was on NS then was on D5 with HCO3 and now refusing fluids.   - D/ricco other Abx and now on vancomycin 1000mg q24 (2nd dose today) and Flagyl 500mg bid  - Ordered TTE, if negative, will do VERITO    # CKD 4 - Creatinine stable around 4.4:  - Creatinine stable around 4.4 , no indication for RRT  - Nephro is following, will f/u OP  - Increased the dose of phosp binders today, please continue  - Pt refused hemodialysis    # Metabolic acidosis: Non anion gap metabolic acidosis, bicarb improved from 13 to 17.  - increased sodium bicarb to 1300 q 8  - s/p D5 with HCO3 - now refusing Iv drip    # No evidence of adrenal insufficiency  - Work-up last month ruled out the usual endocrine causes (cosyntropin test was normal, insulin levels were low-normal when glucose was low  - tapered and will discontinue 10 mg of hydrocortisone on 1/24    # Behavioral abnormalities  - Psych eval noted: patient deemed not suicidal and can make his own decisions  - He signed AMA on 1/11 but did not leave  - Seek more pain meds, already on methadone 80 q8 and oxycodone IR 30 q8. No more pain meds    # Acute normo anemia  - no active bleed  - keep active type and screen  - transfuse to keep Hb > 7  - Iron studies from Nov 2019 shows anemia of chronic disease + iron def  - Started Aranesp 20 mcg qweekly from today  - Ordered 1 PRBC today    # PVD s/p right side AKA and left below knee amputation  - incision sites look clean  - Removal of staples from LLE stump on 1/19    # hypothyroidism  - c/w levothyroxine 100. TSH 14 (going down)  - OP f/u with endo    # h/o esophageal candidiasis  - Completed 14 days of fluconazole course,   - outpt GI fu    # opioid dependance  - cw methadone, 80 q8 and Oxycodone 30mg q8  - outpt clinic fu on discharge    # HTN (not on any medication)    # suspected folate / vit D / Vit b12 DEF  c/w cholecalceferol    # Pt looks malnourished  - Dietician eval noted  - started megace    # Sacral and buttock pressure sores  - Burn eval:  Healing sacral and buttock pressure sores--> no infection as per burn   - soap and water qd, santyl ointment and dakin solution wet to dry dressing , ABD pad dressing change qd  - no surgery needed    PLAN: Follow K, Mag, Bicarb, phosp, blood cultures, Goals of care discussion as he needs atleast 4-6 weeks of Abx, Echo, Check vanco order and follow vanc trough before 3rd dose (tomorrow).    DVT PPX: heparin  GI PPx: not indicated  Dispo: Requesting home services. ACUTE  Diet: K restricted  Activity: OOBTC.  CODE: DNR/DNI/no feeding tubes/no hemodialysis  Patient requested Hospice - 59 years old male known to have PVD s/p right side AKA and left below knee amputation, paraplegia with multiple chronic bilateral buttock and sacral stage 4 ulcers, suprapubic catheter, HTN (not on any medication), CKD stage IV (not following with nephrology), hypothyroidism, opioid dependance, recent admission for cold left foot (chronic left foot dry gangrene, h/o esophageal candidiasis was sent to the rehab due to worsening kidney functions and hypoglycemia with FS 45.    In the ED, pt remained hemodynamically stable but looks very pale, no active bleeding, lab workup showed leukocytosis of 19, normo anemia with Hb 7.7 (vbg 8.7), potassium 6.5 (hemolyzed) repeat 5.2, anion gap metabolic acidosis, worsening CKD 4 with creat 4.1 (baseline 3.5), , cxr ok.    # Septic shock - Now vitally stable  - Had Fever of 101, hypotensive to the 60s systolic, blood pressure perked up after NS bolus and PRBC. lactate 1.4  - UA was positive but Urine culture is negative.  - Likely the source is sacral ulcer  - 1 blood culture on 1/15 was positive for MRSA. Next one on 1/16 negative  - Had Leukocytosis 27,000 trending down > 18K > 9K  - Was on NS then was on D5 with HCO3 and now refusing fluids.   - D/ricco other Abx (as per ID) and now on vancomycin 1000mg q24 (2nd dose today) and Flagyl 500mg bid  - Ordered TTE, if negative, will do VERITO    # CKD 4 - Creatinine stable around 4.4:  - Creatinine stable around 4.4 , no indication for RRT  - Nephro is following, will f/u OP  - Increased the dose of phosp binders today, please continue  - Pt refused hemodialysis    # Metabolic acidosis: Non anion gap metabolic acidosis, bicarb improved from 13 to 17.  - increased sodium bicarb to 1300 q 8  - s/p D5 with HCO3 - now refusing Iv drip    # No evidence of adrenal insufficiency  - Work-up last month ruled out the usual endocrine causes (cosyntropin test was normal, insulin levels were low-normal when glucose was low  - tapered and will discontinue 10 mg of hydrocortisone on 1/24    # Behavioral abnormalities  - Psych eval noted: patient deemed not suicidal and can make his own decisions  - He signed AMA on 1/11 but did not leave  - Seek more pain meds, already on methadone 80 q8 and oxycodone IR 30 q8. No more pain meds    # Acute normo anemia  - no active bleed  - keep active type and screen  - transfuse to keep Hb > 7  - Iron studies from Nov 2019 shows anemia of chronic disease + iron def  - Started Aranesp 20 mcg qweekly from today  - Ordered 1 PRBC today    # PVD s/p right side AKA and left below knee amputation  - incision sites look clean  - Removal of staples from LLE stump on 1/19    # hypothyroidism  - c/w levothyroxine 100. TSH 14 (going down)  - OP f/u with endo    # h/o esophageal candidiasis  - Completed 14 days of fluconazole course,   - outpt GI fu    # opioid dependance  - cw methadone, 80 q8 and Oxycodone 30mg q8  - outpt clinic fu on discharge    # HTN (not on any medication)    # suspected folate / vit D / Vit b12 DEF  c/w cholecalceferol    # Pt looks malnourished  - Dietician eval noted  - started megace    # Sacral and buttock pressure sores  - Burn eval:  Healing sacral and buttock pressure sores--> no infection as per burn   - soap and water qd, santyl ointment and dakin solution wet to dry dressing , ABD pad dressing change qd  - no surgery needed    PLAN: Follow K, Mag, Bicarb, phosp, blood cultures, Goals of care discussion as he needs atleast 4-6 weeks of Abx, Echo, Check vanco order and follow vanc trough before 3rd dose (tomorrow).    DVT PPX: heparin  GI PPx: not indicated  Dispo: Requesting home services. ACUTE  Diet: K restricted  Activity: OOBTC.  CODE: DNR/DNI/no feeding tubes/no hemodialysis  Patient requested Hospice -

## 2020-01-20 NOTE — PROGRESS NOTE ADULT - SUBJECTIVE AND OBJECTIVE BOX
Nephrology progress note    THIS IS AN INCOMPLETE NOTE . FULL NOTE TO FOLLOW SHORTLY    Patient is seen and examined, events over the last 24 h noted .    Allergies:  sulfamethizole (Unknown)    Hospital Medications:   MEDICATIONS  (STANDING):  amitriptyline 25 milliGRAM(s) Oral at bedtime  calcium acetate 1334 milliGRAM(s) Oral three times a day with meals  cefpodoxime 200 milliGRAM(s) Oral daily  cholecalciferol 2000 Unit(s) Oral daily  collagenase Ointment 1 Application(s) Topical two times a day  Dakins Solution - 1/2 Strength 1 Application(s) Topical two times a day  darbepoetin Injectable ViaL 20 MICROGram(s) SubCutaneous every week  doxycycline hyclate Capsule 100 milliGRAM(s) Oral every 12 hours  heparin  Injectable 5000 Unit(s) SubCutaneous every 12 hours  hydrocortisone 10 milliGRAM(s) Oral daily  influenza   Vaccine 0.5 milliLiter(s) IntraMuscular once  levothyroxine 100 MICROGram(s) Oral daily  megestrol 20 milliGRAM(s) Oral three times a day  methadone    Tablet 80 milliGRAM(s) Oral every 8 hours  metroNIDAZOLE    Tablet 500 milliGRAM(s) Oral two times a day  midodrine. 2.5 milliGRAM(s) Oral three times a day  mupirocin 2% Nasal 1 Application(s) Nasal two times a day  oxybutynin 10 milliGRAM(s) Oral two times a day  oxyCODONE    IR 30 milliGRAM(s) Oral every 8 hours  polyethylene glycol 3350 17 Gram(s) Oral at bedtime  sevelamer carbonate 800 milliGRAM(s) Oral three times a day with meals  sodium bicarbonate 1300 milliGRAM(s) Oral three times a day  vancomycin  IVPB 1000 milliGRAM(s) IV Intermittent every 24 hours        VITALS:  T(F): 98.6 (01-20-20 @ 05:58), Max: 98.6 (01-19-20 @ 13:20)  HR: 79 (01-20-20 @ 05:58)  BP: 102/59 (01-20-20 @ 05:58)  RR: 20 (01-20-20 @ 05:58)  SpO2: 97% (01-20-20 @ 05:58)  Wt(kg): --    01-18 @ 07:01  -  01-19 @ 07:00  --------------------------------------------------------  IN: 0 mL / OUT: 2675 mL / NET: -2675 mL    01-19 @ 07:01  -  01-20 @ 07:00  --------------------------------------------------------  IN: 1200 mL / OUT: 1500 mL / NET: -300 mL          PHYSICAL EXAM:  Constitutional: NAD  HEENT: anicteric sclera, oropharynx clear, MMM  Neck: No JVD  Respiratory: CTAB, no wheezes, rales or rhonchi  Cardiovascular: S1, S2, RRR  Gastrointestinal: BS+, soft, NT/ND  Extremities: No cyanosis or clubbing. No peripheral edema  :  No brown.   Skin: No rashes    LABS:  01-19    137  |  108  |  58<H>  ----------------------------<  94  4.2   |  17  |  4.4<HH>    Ca    7.3<L>      19 Jan 2020 07:59  Phos  7.7     01-19  Mg     1.7     01-19    TPro  3.8<L>  /  Alb  1.3<L>  /  TBili  <0.2  /  DBili      /  AST  8   /  ALT  6   /  AlkPhos  157<H>  01-19                          7.2    9.43  )-----------( 238      ( 19 Jan 2020 07:59 )             22.8       Urine Studies:      RADIOLOGY & ADDITIONAL STUDIES: Nephrology progress note  Patient is seen and examined, events over the last 24 h noted .  lying in bed comfortable  denied any complaints     Allergies:  sulfamethizole (Unknown)    Hospital Medications:   MEDICATIONS  (STANDING):  amitriptyline 25 milliGRAM(s) Oral at bedtime  calcium acetate 1334 milliGRAM(s) Oral three times a day with meals  cefpodoxime 200 milliGRAM(s) Oral daily  collagenase Ointment 1 Application(s) Topical two times a day  Dakins Solution - 1/2 Strength 1 Application(s) Topical two times a day  darbepoetin Injectable ViaL 20 MICROGram(s) SubCutaneous every week  doxycycline hyclate Capsule 100 milliGRAM(s) Oral every 12 hours  heparin  Injectable 5000 Unit(s) SubCutaneous every 12 hours  hydrocortisone 10 milliGRAM(s) Oral daily  influenza   Vaccine 0.5 milliLiter(s) IntraMuscular once  levothyroxine 100 MICROGram(s) Oral daily  megestrol 20 milliGRAM(s) Oral three times a day  methadone    Tablet 80 milliGRAM(s) Oral every 8 hours  metroNIDAZOLE    Tablet 500 milliGRAM(s) Oral two times a day  midodrine. 2.5 milliGRAM(s) Oral three times a day  mupirocin 2% Nasal 1 Application(s) Nasal two times a day  oxybutynin 10 milliGRAM(s) Oral two times a day  oxyCODONE    IR 30 milliGRAM(s) Oral every 8 hours  polyethylene glycol 3350 17 Gram(s) Oral at bedtime  sevelamer carbonate 800 milliGRAM(s) Oral three times a day with meals  sodium bicarbonate 1300 milliGRAM(s) Oral three times a day  vancomycin  IVPB 1000 milliGRAM(s) IV Intermittent every 24 hours        VITALS:  T(F): 98.6 (01-20-20 @ 05:58), Max: 98.6 (01-19-20 @ 13:20)  HR: 79 (01-20-20 @ 05:58)  BP: 102/59 (01-20-20 @ 05:58)  RR: 20 (01-20-20 @ 05:58)  SpO2: 97% (01-20-20 @ 05:58)      01-18 @ 07:01  -  01-19 @ 07:00  --------------------------------------------------------  IN: 0 mL / OUT: 2675 mL / NET: -2675 mL    01-19 @ 07:01  -  01-20 @ 07:00  --------------------------------------------------------  IN: 1200 mL / OUT: 1500 mL / NET: -300 mL          PHYSICAL EXAM:  Constitutional: NAD  HEENT: anicteric sclera, oropharynx clear, MMM  Neck: No JVD  Respiratory: CTAB, no wheezes, rales or rhonchi  Cardiovascular: S1, S2, RRR  Gastrointestinal: BS+, soft, NT/ND  Extremities: No cyanosis or clubbing. No peripheral edema/ bilateral BKA   :  No brown.   Skin: No rashes    LABS:  01-19    137  |  108  |  58<H>  ----------------------------<  94  4.2   |  17  |  4.4<HH>  Creatinine Trend: 4.4<--, 4.6<--, 4.5<--, 4.5<--, 4.3<--, 4.4<--  Ca    7.3<L>      19 Jan 2020 07:59  Phos  7.7     01-19  Mg     1.7     01-19    TPro  3.8<L>  /  Alb  1.3<L>  /  TBili  <0.2  /  DBili      /  AST  8   /  ALT  6   /  AlkPhos  157<H>  01-19                          7.2    9.43  )-----------( 238      ( 19 Jan 2020 07:59 )             22.8       Urine Studies:      RADIOLOGY & ADDITIONAL STUDIES:

## 2020-01-20 NOTE — PROGRESS NOTE ADULT - ASSESSMENT
59 years old male known to have PVD s/p right side AKA and left below knee amputation, paraplegia with multiple chronic bilateral buttock and sacral stage 4 ulcers, suprapubic catheter, HTN (not on any medication), CKD stage IV (not following with nephrology), hypothyroidism, opioid dependance, recent admission for cold left foot (chronic left foot dry gangrene, h/o esophageal candidiasis was sent to the rehab due to worsening kidney functions and hypoglycemia with FS 45. In the ED, pt remained hemodynamically stable but looks very pale, no active bleeding, lab workup showed leukocytosis of 19, normo anemia with Hb 7.7 (vbg 8.7), potassium 6.5 (hemolyzed) repeat 5.2, anion gap metabolic acidosis, worsening CKD 4 with creat 4.1 (baseline 3.5), , cxr ok.      A/P     #  h/o Septic shock/ UTI/ MRSA bacteremia   - resolved  - pt was consulted by ID, needs follow up, blood CX from 1/15 is positive for MRSA  - contact isolation   - c/w Doxycycline,  IV Vanco ( renal dose ), check Vanco through after 3rd dose    - f/u repeat blood Cx ( only one set is negative )   - get 2Decho       # CKD 4/ metabolic acidosis   - renal diet    - Creatinine stable  - nephrology is following, case d/w nephrology attending today, no HD recommended   - pt is refusing bicarb drip, acidosis is resolving, d/c bicarb drip, c/w po bicarb     # Behavioral abnormalities/ h/o opioid dependance   - consulted by psychiatry:  patient deemed not suicidal and can make his own decisions  - on  methadone 80 q8 and oxycodone IR 30 q8  - pt is noncompliant, refusing medications and blood draws      #  PVD   - s/p right side AKA and left below knee amputation  - not on ASA or statin, consider low dose of statin     # hypothyroidism  - c/w levothyroxine 100. TSH 14 (going down)  - OP f/u with endo    # h/o Hypoglycemia  - started on steroids empirically  - taper off hydrocortisone   - monitor finger stick     # Anemia of chronic Dz  - send anemia work up   - monitor H/H, transfuse one unit of PRBC today if repeat Hb below 7.5   - no active bleeding noted     # suspected folate / vit D / Vit b12 DEF  c/w cholecalciferol    # mild malnutrition   - Dietician eval noted  - on  megace    # Sacral and buttock pressure sores  - Burn eval:  Healing sacral and buttock pressure sores--> no infection-position change  - soap and water qd, santyl ointment and dakin solution wet to dry dressing , ABD pad dressing change qd  - change position q 2 hours, off load pressure points       DVT/GI ppx  guarded prognosis, OOB to chair, PT/Rehab    #Progress Note Handoff  Pending (specify):  ID follow up for MRSA bacteriemia , get 2Decho, check Vanco through after 3 rd dose, d/c bicarb drip, c/w po bicarb,  f/u repeat CBC, transfuse one unit of PRBC if Hb below 7.5, put pt on contact isolation for MRSA   Family discussion: n/a, I spoke with pt, he agree with a plan of care   Disposition: pt likely needs SNF

## 2020-01-20 NOTE — PROGRESS NOTE ADULT - ASSESSMENT
ASSESSMENT  58 yo M PVD s/p right side AKA and left below knee amputation, paraplegia with multiple chronic bilateral buttock and sacral stage 4 ulcers, suprapubic catheter, HTN (not on any medication), CKD stage IV (not following with nephrology), hypothyroidism, opioid dependance, recent admission for cold left foot (chronic left foot dry gangrene, h/o esophageal candidiasis was sent to the rehab due to worsening kidney functions and hypoglycemia     IMPRESSION  #MRSA bacteremia     1/15+, 1/16 NG  #Sacral ulcer, infected, febrile 1/14 T101    BCX NG    Sepsis on admission P>90 WBC 18    10/2019 wcx heel MRSA, ecoli (R fluoro, unasyn, gent, bactrim, I zosyn), enterococcus  #CKD   #Malnutrition BMI (kg/m2): 17.5  #Abx allergy: sulfamethizole (Unknown)    RECOMMENDATIONS  - Burn consult appreciated- no debridement  - repeat BCX  - TTE  - Dapto 6mg/kg q24h IV daily , would avoid vanc given crcl  - flagyl 500mg q8h IV   - Palliative Care  - No role for prolonged IV therapy in chronic OM. Continue offloading and nutritional support  - On fluc for esophagitis, course per GI   - Trend WBC, Cr    Spectra [id number] ASSESSMENT  60 yo M PVD s/p right side AKA and left below knee amputation, paraplegia with multiple chronic bilateral buttock and sacral stage 4 ulcers, suprapubic catheter, HTN (not on any medication), CKD stage IV (not following with nephrology), hypothyroidism, opioid dependance, recent admission for cold left foot (chronic left foot dry gangrene, h/o esophageal candidiasis was sent to the rehab due to worsening kidney functions and hypoglycemia     IMPRESSION  #MRSA bacteremia     1/15+, 1/16 NG  #Sacral ulcer, infected, febrile 1/14 T101    BCX NG    Sepsis on admission P>90 WBC 18    10/2019 wcx heel MRSA, ecoli (R fluoro, unasyn, gent, bactrim, I zosyn), enterococcus  #CKD   #Malnutrition BMI (kg/m2): 17.5  #Abx allergy: sulfamethizole (Unknown)    RECOMMENDATIONS  - Burn consult appreciated- no debridement  - repeat BCX  - TTE  - linezolid 600mg q12h IV , would avoid vanc given crcl  - flagyl 500mg q8h IV   - Palliative Care  - No role for prolonged IV therapy in chronic OM. Continue offloading and nutritional support  - On fluc for esophagitis, course per GI   - Trend WBC, Cr    Spectra [id number] ASSESSMENT  60 yo M PVD s/p right side AKA and left below knee amputation, paraplegia with multiple chronic bilateral buttock and sacral stage 4 ulcers, suprapubic catheter, HTN (not on any medication), CKD stage IV (not following with nephrology), hypothyroidism, opioid dependance, recent admission for cold left foot (chronic left foot dry gangrene, h/o esophageal candidiasis was sent to the rehab due to worsening kidney functions and hypoglycemia     IMPRESSION  #MRSA bacteremia     1/15+, 1/16 NG  #Sacral ulcer, infected, febrile 1/14 T101    BCX NG    Sepsis on admission P>90 WBC 18    10/2019 wcx heel MRSA, ecoli (R fluoro, unasyn, gent, bactrim, I zosyn), enterococcus  #CKD   #Malnutrition BMI (kg/m2): 17.5  #Abx allergy: sulfamethizole (Unknown)    RECOMMENDATIONS  - Burn consult appreciated- no debridement  - repeat BCX  - TTE  - Dapto 4mg/kg q48h IV, would avoid vanc given crcl. cannot give zyvox as on methadone  - flagyl 500mg q8h IV   - Palliative Care  - No role for prolonged IV therapy in chronic OM. Continue offloading and nutritional support  - On fluc for esophagitis, course per GI   - Trend WBC, Cr    Spectra [id number]

## 2020-01-20 NOTE — DIETITIAN INITIAL EVALUATION ADULT. - NUTRITIONGOAL OUTCOME1
22
pt to continue to tolerate po diet and consume > 50% of meals-pt refuses supplements at this time

## 2020-01-20 NOTE — CONSULT NOTE ADULT - ASSESSMENT
Medication refill check list    Correct patient? yes   Correct medication name, dose, and pill size? yes   Correct provider? yes   Last and Next appt  scheduled? Yes, last date 11/2/18 & next date 09/01/20   Right pharmacy listed? yes   Correct quantity for 30 or 90 days? yes   Is the patient out of refills? When was it last prescribed? Yes, 12/20/19   Directions match what the patient says they are taking?  yes   Enough refills? (none for controlled substances, 1 year for routine medications) yes 58 years old male with reported PMH of HTN, chronic back pain, chronic anemia, L1 burst fracture with paraplegia since 1986 (bedbound and wheelchair bound), PAD s/p recent BKA on 6/4, neurogenic bladder with suprapubic catheter presented with weakness and generalized pain.    # Suspected Adrenal Insufficiency  - ACTH mildly reduced   - vitals stable; no need to routinely monitor glucose  - MR: no pituitary mass  - please discontinue Dexamethasone and Florinef  - outpt endocrine follow

## 2020-01-20 NOTE — PROGRESS NOTE ADULT - ASSESSMENT
59 years old male known to have PVD s/p right side AKA and left below knee amputation, paraplegia with multiple chronic bilateral buttock and sacral stage 4 ulcers, suprapubic catheter, HTN (not on any medication), CKD stage IV (not following with nephrology), hypothyroidism, opioid dependance severe ASPVD presenting with Acidosis  ·	CKD 4 / creat slowly trending up  ·	corrected Ca at goal.  ·	Phosphorus noted continue binders   ·	Acidosis probably related to CKD / continue sodium bicarb 1300 mg po q8h  ·	refusing bicarb drip   ·	monitor I/O  ·	Anemia chronic / previous  Fe studies noted / repeat  start Aranesp 20 mcg sq qweek   ·	pt considering hospice care. had detailed discussion with pt about dialysis. Pt will think about it. No need for urgent RRT     will follow

## 2020-01-21 NOTE — PROGRESS NOTE ADULT - SUBJECTIVE AND OBJECTIVE BOX
LENGTH OF HOSPITAL STAY: 11d    CHIEF COMPLAINT:   Patient is a 59y old  Male who presents with a chief complaint of Septic shock (20 Jan 2020 10:48)      Overnight events:    No acute events overnight    ALLERGIES:  sulfamethizole (Unknown)    MEDICATIONS:  STANDING MEDICATIONS  amitriptyline 25 milliGRAM(s) Oral at bedtime  calcium acetate 1334 milliGRAM(s) Oral three times a day with meals  chlorhexidine 4% Liquid 1 Application(s) Topical <User Schedule>  cholecalciferol 2000 Unit(s) Oral daily  collagenase Ointment 1 Application(s) Topical two times a day  Dakins Solution - 1/2 Strength 1 Application(s) Topical two times a day  DAPTOmycin IVPB 240 milliGRAM(s) IV Intermittent every 48 hours  darbepoetin Injectable ViaL 20 MICROGram(s) SubCutaneous every week  heparin  Injectable 5000 Unit(s) SubCutaneous every 12 hours  hydrocortisone 10 milliGRAM(s) Oral daily  influenza   Vaccine 0.5 milliLiter(s) IntraMuscular once  levothyroxine 100 MICROGram(s) Oral daily  megestrol 20 milliGRAM(s) Oral three times a day  methadone    Tablet 80 milliGRAM(s) Oral every 8 hours  metroNIDAZOLE    Tablet 500 milliGRAM(s) Oral two times a day  midodrine. 2.5 milliGRAM(s) Oral three times a day  mupirocin 2% Nasal 1 Application(s) Nasal two times a day  oxybutynin 10 milliGRAM(s) Oral two times a day  oxyCODONE    IR 30 milliGRAM(s) Oral every 8 hours  polyethylene glycol 3350 17 Gram(s) Oral at bedtime  sevelamer carbonate 800 milliGRAM(s) Oral three times a day with meals  sodium bicarbonate 1300 milliGRAM(s) Oral three times a day    PRN MEDICATIONS  acetaminophen   Tablet .. 650 milliGRAM(s) Oral every 6 hours PRN  diazepam    Tablet 5 milliGRAM(s) Oral two times a day PRN    VITALS:   T(F): 100.7  HR: 84  BP: 130/66  RR: 18  SpO2: 98%    LABS:                        8.7    6.67  )-----------( 195      ( 21 Jan 2020 07:42 )             26.7                           Cultures:      RADIOLOGY:    PHYSICAL EXAM:  GEN: No acute distress  HEENT:   LUNGS: Clear to auscultation bilaterally   HEART: S1/S2 present. RRR.   ABD: Soft, non-tender, non-distended. Bowel sounds present  EXT:  NEURO: AAOX3 LENGTH OF HOSPITAL STAY: 11d    CHIEF COMPLAINT:   Patient is a 59y old  Male who presents with a chief complaint of Septic shock (20 Jan 2020 10:48)      Overnight events:    No acute events overnight    ALLERGIES:  sulfamethizole (Unknown)    MEDICATIONS:  STANDING MEDICATIONS  amitriptyline 25 milliGRAM(s) Oral at bedtime  calcium acetate 1334 milliGRAM(s) Oral three times a day with meals  chlorhexidine 4% Liquid 1 Application(s) Topical <User Schedule>  cholecalciferol 2000 Unit(s) Oral daily  collagenase Ointment 1 Application(s) Topical two times a day  Dakins Solution - 1/2 Strength 1 Application(s) Topical two times a day  DAPTOmycin IVPB 240 milliGRAM(s) IV Intermittent every 48 hours  darbepoetin Injectable ViaL 20 MICROGram(s) SubCutaneous every week  heparin  Injectable 5000 Unit(s) SubCutaneous every 12 hours  hydrocortisone 10 milliGRAM(s) Oral daily  influenza   Vaccine 0.5 milliLiter(s) IntraMuscular once  levothyroxine 100 MICROGram(s) Oral daily  megestrol 20 milliGRAM(s) Oral three times a day  methadone    Tablet 80 milliGRAM(s) Oral every 8 hours  metroNIDAZOLE    Tablet 500 milliGRAM(s) Oral two times a day  midodrine. 2.5 milliGRAM(s) Oral three times a day  mupirocin 2% Nasal 1 Application(s) Nasal two times a day  oxybutynin 10 milliGRAM(s) Oral two times a day  oxyCODONE    IR 30 milliGRAM(s) Oral every 8 hours  polyethylene glycol 3350 17 Gram(s) Oral at bedtime  sevelamer carbonate 800 milliGRAM(s) Oral three times a day with meals  sodium bicarbonate 1300 milliGRAM(s) Oral three times a day    PRN MEDICATIONS  acetaminophen   Tablet .. 650 milliGRAM(s) Oral every 6 hours PRN  diazepam    Tablet 5 milliGRAM(s) Oral two times a day PRN    VITALS:   T(F): 100.7  HR: 84  BP: 130/66  RR: 18  SpO2: 98%    LABS:                        8.7    6.67  )-----------( 195      ( 21 Jan 2020 07:42 )             26.7                           Cultures:      RADIOLOGY:    PHYSICAL EXAM:  GEN: No acute distress, pale, NC/AT  LUNGS: Clear to auscultation bilaterally, no wheezes   HEART: S1/S2 present. RRR. No murmurs  ABD: Soft, non-tender, non-distended. Bowel sounds present  EXT: L BKA, R AKA. Sacral ulcers and lower back ulcer, foul smelling with surrounding erythema  NEURO: AAOX3    Intravenous access: No IV access  NG tube: None  Supra pubic Catheter: Placed (patient change by himself, no need of urology)

## 2020-01-21 NOTE — PROGRESS NOTE ADULT - ASSESSMENT
59 years old male known to have PVD s/p right side bKA and left below knee amputation, paraplegia with multiple chronic bilateral buttock and sacral stage 4 ulcers, suprapubic catheter, HTN (not on any medication), CKD stage IV (not following with nephrology), hypothyroidism, opioid dependance,  h/o esophageal candidiasis was sent from rehab due to worsening kidney functions and hypoglycemia.   Pt only states he had cloudy urine for past few days. states even if needed he will not go for hemodialysis. (10 Toni 2020 21:52)    # Sepsis POA  #  MRSA bactremia  - Blood culture:  Methicillin resistant Staphylococcus aureus (MRSA): Detec (01.15.20 @ 15:45)  - blood Culture Results: No growth to date. (01.16.20 @ 07:24)  - MRSA PCR Result.: Positive: By: Real-Time PCR (Polymerase Reaction Method) (12.02.19 @ 18:50)  -  Xray Chest 1 View AP/PA (01.10.20 @ 14:04) >No consolidation, effusion or pneumothorax.  - echo: no vegetation seen  - C/w Daptomycin, flagyl    # Sacral and buttock pressure sores  - Burn eval:  Healing sacral and buttock pressure sores--> no infection  - soap and water qd, santyl ointment and dakin solution wet to dry dressing , ABD pad dressing change qd  - no surgery needed    # Esophageal candidiasis with erosive gastritis  - EGD (12.27.19 @ 09:30) >esophagitis in the lower third of the esophagus compatible with non-erosive esophagitis. (Biopsy).  Ulcers in the upper third of the esophagus and middle third of the esophagus.(Biopsy). Erosions in the antrum compatible with erosive gastritis. (Biopsy). Erythema in the stomach body and fundus compatible with non-erosive gastritis.(Biopsy).   - Surgical Pathology Report (12.27.19 @ 12:30)  Esophageal ulcers, biopsy: Fragments of esophageal squamous mucosa, with Candida esophagitis (see Comment). GMS stain highlights the budding yeastlike fungi andpseuohyphae of Candida.  Mid esophagus, biopsy:- Fragments of esophageal squamous mucosa with mild nonspecific chronic inflammation.  - completed course with  flucanazole  - c/w protonix    # Asymptomatic Hypoglycemia, likely due to chronic malnutrition vs methadone (some case reports about hypoglycemia)  -HbA1c 4.8 (1/11/2020)  - Endocrine F/u :It appears that the patient's mild-moderate hypoglycemia is related to markedly reduced muscle mass and may in part be related to opiates.  Would encourage nutrition, use appetite stimulants.    # PVD with B/l BKA  - vascular surgery F/u : removed staples from LLE stump. Incision was well healed and staples were easily remov    # Hypomagnesemia  - replete Mg    # Decrease PO intake   - c/w megace    # CKD stage 4, acidosis  - refused dialysis  - c/w phoslo, renagel  - c/w sodium bicarb    # Hypothyroidism  - Thyroid Stimulating Hormone, Serum: 14.10 uIU/mL (01.13.20 @ 11:29)  - c/w synthroid  -please check TSH as an OP to decide on dosage adjustments    # No evidence of adrenal insufficiency  - Work-up last month ruled out the usual endocrine causes (cosyntropin test was normal, insulin levels were low-normal when glucose was low  - taper and discontinue  hydrocortisone    # Vit D def  - F/u Vit Dlevels  -c/w cholecalciferol    # Chronic opiate dependence   - follows up with outpt  pain management dr Mathew c/w his home meds    # DVT prophylaxis    # GOC discussed: Pt wants to be DNR/ DNI/ no feeding tube/no dialysis/ limited medical intervention    # Pending:ID f/u, PICC line for IV antibiotics  # Discussed with patient plan of care.   # Disposition: home

## 2020-01-21 NOTE — PROGRESS NOTE ADULT - SUBJECTIVE AND OBJECTIVE BOX
Patient is a 59y old  Male who presents with a chief complaint of elevated potassium and hypoglycemia (15 Toni 2020 16:23)    Patient was seen and examined.  Denies any complaints.  All systems reviewed.     PAST MEDICAL & SURGICAL HISTORY:  Anemia  PAD (peripheral artery disease)  Hypertension  Suprapubic catheter  Pressure ulcer  Diabetes  Lumbar compression fracture  S/P below knee amputation, left  S/P below knee amputation, right  S/P debridement  Toe amputation status, right  H/O hernia repair    Allergies  sulfamethizole (Unknown)    MEDICATIONS  (STANDING):  amitriptyline 25 milliGRAM(s) Oral at bedtime  calcium acetate 1334 milliGRAM(s) Oral three times a day with meals  chlorhexidine 4% Liquid 1 Application(s) Topical <User Schedule>  cholecalciferol 2000 Unit(s) Oral daily  collagenase Ointment 1 Application(s) Topical two times a day  Dakins Solution - 1/2 Strength 1 Application(s) Topical two times a day  DAPTOmycin IVPB 240 milliGRAM(s) IV Intermittent every 48 hours  darbepoetin Injectable ViaL 20 MICROGram(s) SubCutaneous every week  heparin  Injectable 5000 Unit(s) SubCutaneous every 12 hours  hydrocortisone 10 milliGRAM(s) Oral daily  influenza   Vaccine 0.5 milliLiter(s) IntraMuscular once  levothyroxine 100 MICROGram(s) Oral daily  megestrol 20 milliGRAM(s) Oral three times a day  methadone    Tablet 80 milliGRAM(s) Oral every 8 hours  metroNIDAZOLE    Tablet 500 milliGRAM(s) Oral two times a day  midodrine. 2.5 milliGRAM(s) Oral three times a day  mupirocin 2% Nasal 1 Application(s) Nasal two times a day  oxybutynin 10 milliGRAM(s) Oral two times a day  oxyCODONE    IR 30 milliGRAM(s) Oral every 8 hours  polyethylene glycol 3350 17 Gram(s) Oral at bedtime  sevelamer carbonate 800 milliGRAM(s) Oral three times a day with meals  sodium bicarbonate 1300 milliGRAM(s) Oral three times a day    MEDICATIONS  (PRN):  acetaminophen   Tablet .. 650 milliGRAM(s) Oral every 6 hours PRN Temp greater or equal to 38.5C (101.3F), Mild Pain (1 - 3)  diazepam    Tablet 5 milliGRAM(s) Oral two times a day PRN anxiety    T(C): 38.2 (01-21-20 @ 04:58), Max: 38.2 (01-21-20 @ 04:58)  HR: 84 (01-21-20 @ 04:58) (80 - 84)  BP: 130/66 (01-21-20 @ 04:58) (130/66 - 149/65)  RR: 18 (01-21-20 @ 04:58) (18 - 18)  SpO2: 98% (01-21-20 @ 04:58) (98% - 98%)    O/E:  Awake, alert, not in distress.  HEENT: atraumatic, EOMI.  Chest: clear.  CVS: SIS2 +, no murmur.  P/A: Soft, BS+  CNS: non focal.  Ext: B/l BKA   Skin: b/l buttock and sacral ulcers  All systems reviewed positive findings as above.                                   8.7<L>  6.67  )-----------( 195      ( 21 Jan 2020 07:42 )             26.7<L>                        7.6<L>  8.45  )-----------( 229      ( 20 Jan 2020 11:41 )             23.5<L>  01-21    137  |  104  |  50<H>  ----------------------------<  93  4.4   |  20  |  4.1<HH>    Ca    7.3<L>      21 Jan 2020 07:42  Phos  6.6     01-21  Mg     1.4     01-21    TPro  3.9<L>  /  Alb  1.3<L>  /  TBili  0.2  /  DBili  x   /  AST  10  /  ALT  6   /  AlkPhos  150<H>  01-21

## 2020-01-21 NOTE — PROGRESS NOTE ADULT - ASSESSMENT
59 years old male known to have PVD s/p right side AKA and left below knee amputation, paraplegia with multiple chronic bilateral buttock and sacral stage 4 ulcers, suprapubic catheter, HTN (not on any medication), CKD stage IV (not following with nephrology), hypothyroidism, opioid dependance, recent admission for cold left foot (chronic left foot dry gangrene, h/o esophageal candidiasis was sent to the rehab due to worsening kidney functions and hypoglycemia with FS 45.    In the ED, pt remained hemodynamically stable but looks very pale, no active bleeding, lab workup showed leukocytosis of 19, normo anemia with Hb 7.7 (vbg 8.7), potassium 6.5 (hemolyzed) repeat 5.2, anion gap metabolic acidosis, worsening CKD 4 with creat 4.1 (baseline 3.5), , cxr ok.    # Septic shock - Now vitally stable  - Had Fever of 101, hypotensive to the 60s systolic, blood pressure perked up after NS bolus and PRBC. lactate 1.4  - UA was positive but Urine culture is negative.  - Likely the source is sacral ulcer  - 1 blood culture on 1/15 was positive for MRSA. Next one on 1/16 negative  - Had Leukocytosis 27,000 trending down > 18K > 9K  - Was on NS then was on D5 with HCO3 and now refusing fluids.   - D/ricco other Abx (as per ID) and now on vancomycin 1000mg q24 (2nd dose today) and Flagyl 500mg bid  - Ordered TTE, if negative, will do VERITO    # CKD 4 - Creatinine stable around 4.4:  - Creatinine stable around 4.4 , no indication for RRT  - Nephro is following, will f/u OP  - Increased the dose of phosp binders today, please continue  - Pt refused hemodialysis    # Metabolic acidosis: Non anion gap metabolic acidosis, bicarb improved from 13 to 17.  - increased sodium bicarb to 1300 q 8  - s/p D5 with HCO3 - now refusing Iv drip    # No evidence of adrenal insufficiency  - Work-up last month ruled out the usual endocrine causes (cosyntropin test was normal, insulin levels were low-normal when glucose was low  - tapered and will discontinue 10 mg of hydrocortisone on 1/24    # Behavioral abnormalities  - Psych eval noted: patient deemed not suicidal and can make his own decisions  - He signed AMA on 1/11 but did not leave  - Seek more pain meds, already on methadone 80 q8 and oxycodone IR 30 q8. No more pain meds    # Acute normo anemia  - no active bleed  - keep active type and screen  - transfuse to keep Hb > 7  - Iron studies from Nov 2019 shows anemia of chronic disease + iron def  - Started Aranesp 20 mcg qweekly from today  - Ordered 1 PRBC today    # PVD s/p right side AKA and left below knee amputation  - incision sites look clean  - Removal of staples from LLE stump on 1/19    # hypothyroidism  - c/w levothyroxine 100. TSH 14 (going down)  - OP f/u with endo    # h/o esophageal candidiasis  - Completed 14 days of fluconazole course,   - outpt GI fu    # opioid dependance  - cw methadone, 80 q8 and Oxycodone 30mg q8  - outpt clinic fu on discharge    # HTN (not on any medication)    # suspected folate / vit D / Vit b12 DEF  c/w cholecalceferol    # Pt looks malnourished  - Dietician eval noted  - started megace    # Sacral and buttock pressure sores  - Burn eval:  Healing sacral and buttock pressure sores--> no infection as per burn   - soap and water qd, santyl ointment and dakin solution wet to dry dressing , ABD pad dressing change qd  - no surgery needed    PLAN: Follow K, Mag, Bicarb, phosp, blood cultures, Goals of care discussion as he needs atleast 4-6 weeks of Abx, Echo,     DVT PPX: heparin  GI PPx: not indicated  Dispo: Requesting home services. ACUTE  Diet: K restricted  Activity: OOBTC.  CODE: DNR/DNI/no feeding tubes/no hemodialysis  Patient requested Hospice - 59 years old male known to have PVD s/p right side AKA and left below knee amputation, paraplegia with multiple chronic bilateral buttock and sacral stage 4 ulcers, suprapubic catheter, HTN (not on any medication), CKD stage IV (not following with nephrology), hypothyroidism, opioid dependance, recent admission for cold left foot (chronic left foot dry gangrene, h/o esophageal candidiasis was sent to the rehab due to worsening kidney functions and hypoglycemia with FS 45.    In the ED, pt remained hemodynamically stable but looks very pale, no active bleeding, lab workup showed leukocytosis of 19, normo anemia with Hb 7.7 (vbg 8.7), potassium 6.5 (hemolyzed) repeat 5.2, anion gap metabolic acidosis, worsening CKD 4 with creat 4.1 (baseline 3.5), , cxr ok.    # Septic shock - Now vitally stable  - Had Fever of 101, hypotensive to the 60s systolic, blood pressure perked up after NS bolus and PRBC. lactate 1.4  - Leukocytosis improved from 27K to 6K. No fever in last 48 hours.  - Likely the source is sacral ulcer - chronic OM  - 1 blood culture on 1/15 was positive for MRSA. 1/16 negative, repeat BCx from 1/20 are pending  - Was on NS then was on D5 with HCO3 and now refusing fluids.   - D/ricco other Abx (as per ID) and now on Daptomycin and Flagyl 500mg bid  - Echo: No vegetations can bee seen on TTE    # CKD 4 - Creatinine stable around 4.4:  - Creatinine stable around 4.4 , no indication for RRT  - Nephro is following, will f/u OP  - Increased the dose of phosp binders today, please continue  - Pt refused hemodialysis    # Metabolic acidosis: Non anion gap metabolic acidosis, bicarb improved from 13 to 20.  - increased sodium bicarb to 1300 q 8  - s/p D5 with HCO3 - now refusing Iv drip    # Hypoglycemia  - No evidence of adrenal insufficiency  - Work-up last month ruled out the usual endocrine causes (cosyntropin test was normal, insulin levels were low-normal when glucose was low  - tapered HC and will discontinue 10 mg of hydrocortisone on 1/24    # hypothyroidism  - c/w levothyroxine 100. TSH 14 (going down)  - OP f/u with endo    # Behavioral abnormalities  - Psych eval noted: patient deemed not suicidal and can make his own decisions  - He signed AMA on 1/11 but did not leave  - Seek more pain meds, already on methadone 80 q8 and oxycodone IR 30 q8. No more pain meds    # Acute normo anemia  - no active bleed  - keep active type and screen  - transfuse to keep Hb > 7  - Iron studies from Nov 2019 shows anemia of chronic disease + iron def  - Started Aranesp 20 mcg qweekly from today  - Ordered 1 PRBC today    # PVD s/p right side AKA and left below knee amputation  - incision sites look clean  - Removal of staples from LLE stump on 1/19    # h/o esophageal candidiasis  - Completed 14 days of fluconazole course,   - outpt GI fu    # opioid dependance  - cw methadone, 80 q8 and Oxycodone 30mg q8  - outpt clinic fu on discharge    # HTN (not on any medication)    # suspected folate / vit D / Vit b12 DEF  c/w cholecalceferol    # Pt looks malnourished  - Dietician eval noted  - started megace    # Sacral and buttock pressure sores  - Burn eval:  Healing sacral and buttock pressure sores--> no infection as per burn   - soap and water qd, santyl ointment and dakin solution wet to dry dressing , ABD pad dressing change qd  - no surgery needed    PLAN: Follow K, Mag, Bicarb, phosp, blood cultures, Goals of care discussion as he needs atleast 4-6 weeks of Abx, Echo,     DVT PPX: heparin  GI PPx: not indicated  Dispo: Requesting home services. ACUTE  Diet: K restricted  Activity: OOBTC.  CODE: DNR/DNI/no feeding tubes/no hemodialysis  Patient requested Hospice - 59 years old male known to have PVD s/p right side AKA and left below knee amputation, paraplegia with multiple chronic bilateral buttock and sacral stage 4 ulcers, suprapubic catheter, HTN (not on any medication), CKD stage IV (not following with nephrology), hypothyroidism, opioid dependance, recent admission for cold left foot (chronic left foot dry gangrene, h/o esophageal candidiasis was sent to the rehab due to worsening kidney functions and hypoglycemia with FS 45.    In the ED, pt remained hemodynamically stable but looks very pale, no active bleeding, lab workup showed leukocytosis of 19, normo anemia with Hb 7.7 (vbg 8.7), potassium 6.5 (hemolyzed) repeat 5.2, anion gap metabolic acidosis, worsening CKD 4 with creat 4.1 (baseline 3.5), , cxr ok.    # Septic shock - Now vitally stable  - Had Fever of 101, hypotensive to the 60s systolic, blood pressure perked up after NS bolus and PRBC. lactate 1.4  - Leukocytosis improved from 27K to 6K. No fever in last 48 hours.  - Likely the source is sacral ulcer - chronic OM  - 1 blood culture on 1/15 was positive for MRSA. 1/16 negative, repeat BCx from 1/20 are pending  - Was on NS then was on D5 with HCO3 and now refusing fluids.   - D/ricco other Abx (as per ID) and now on Daptomycin and Flagyl 500mg bid  - Echo: No vegetations can bee seen on TTE    # CKD 4 - Creatinine stable around 4.4:  - Creatinine stable around 4.4 , no indication for RRT  - Nephro is following, will f/u OP  - Increased the dose of phosp binders today, please continue  - Pt refused hemodialysis    # Metabolic acidosis: Non anion gap metabolic acidosis, bicarb improved from 13 to 20.  - increased sodium bicarb to 1300 q 8  - s/p D5 with HCO3 - now refusing Iv drip    # Hypoglycemia  -No evidence of adrenal insufficiency from blood work before in dec 2019 cosyntropin test negative, no need for hydrocortisone taper to 10mg for 1 week and stop it.  -Low insulin level of 2 , c peptide within normal limits in dec 2019  - No evidence of adrenal insufficiency  - Work-up last month ruled out the usual endocrine causes (cosyntropin test was normal, insulin levels were low-normal when glucose was low  - tapered HC and will discontinue 10 mg of hydrocortisone on 1/24    # hypothyroidism  - c/w levothyroxine 100. TSH 14 (going down)  - OP f/u with endo    # Behavioral abnormalities  - Psych eval noted: patient deemed not suicidal and can make his own decisions  - He signed AMA on 1/11 but did not leave  - Seek more pain meds, already on methadone 80 q8 and oxycodone IR 30 q8. No more pain meds    # Acute normo anemia  - no active bleed  - keep active type and screen  - transfuse to keep Hb > 7  - Iron studies from Nov 2019 shows anemia of chronic disease + iron def  - Started Aranesp 20 mcg qweekly from today  - Ordered 1 PRBC today    # PVD s/p right side AKA and left below knee amputation  - incision sites look clean  - Removal of staples from LLE stump on 1/19    # h/o esophageal candidiasis  - Completed 14 days of fluconazole course,   - outpt GI fu    # opioid dependance  - cw methadone, 80 q8 and Oxycodone 30mg q8  - outpt clinic fu on discharge    # HTN (not on any medication)    # suspected folate / vit D / Vit b12 DEF  c/w cholecalceferol    # Pt looks malnourished  - Dietician eval noted  - started megace    # Sacral and buttock pressure sores  - Burn eval:  Healing sacral and buttock pressure sores--> no infection as per burn   - soap and water qd, santyl ointment and dakin solution wet to dry dressing , ABD pad dressing change qd  - no surgery needed    PLAN: Follow K, Mag, Bicarb, phosp, blood cultures, Goals of care discussion as he needs atleast 4-6 weeks of Abx, Echo,     DVT PPX: heparin  GI PPx: not indicated  Dispo: Requesting home services. ACUTE  Diet: K restricted  Activity: OOBTC.  CODE: DNR/DNI/no feeding tubes/no hemodialysis  Patient requested Hospice - 59 years old male known to have PVD s/p right side AKA and left below knee amputation, paraplegia with multiple chronic bilateral buttock and sacral stage 4 ulcers, suprapubic catheter, HTN (not on any medication), CKD stage IV (not following with nephrology), hypothyroidism, opioid dependance, recent admission for cold left foot (chronic left foot dry gangrene, h/o esophageal candidiasis was sent to the rehab due to worsening kidney functions and hypoglycemia with FS 45.    In the ED, pt remained hemodynamically stable but looks very pale, no active bleeding, lab workup showed leukocytosis of 19, normo anemia with Hb 7.7 (vbg 8.7), potassium 6.5 (hemolyzed) repeat 5.2, anion gap metabolic acidosis, worsening CKD 4 with creat 4.1 (baseline 3.5), , cxr ok.    # Septic shock - Now vitally stable  - Had Fever of 101, hypotensive to the 60s systolic, blood pressure perked up after NS bolus and PRBC. lactate 1.4  - Leukocytosis improved from 27K to 6K. No fever in last 48 hours.  - Likely the source is sacral ulcer - chronic OM  - 1 blood culture on 1/15 was positive for MRSA. 1/16 negative, repeat BCx from 1/20 are pending  - Was on NS then was on D5 with HCO3 and now refusing fluids.   - D/ricco other Abx (as per ID) and now on Daptomycin and Flagyl 500mg bid  - Echo: No vegetations can bee seen on TTE    # CKD 4 - Creatinine stable around 4.4:  - Creatinine stable around 4.4 , no indication for RRT  - Nephro is following, will f/u OP  - Increased the dose of phosp binders today, please continue  - Pt refused hemodialysis    # Metabolic acidosis: Non anion gap metabolic acidosis, bicarb improved from 13 to 20.  - increased sodium bicarb to 1300 q 8  - s/p D5 with HCO3 - now refusing Iv drip    # Hypoglycemia  -No evidence of adrenal insufficiency from blood work before in dec 2019 cosyntropin test negative, no need for hydrocortisone taper to 10mg for 1 week and stop it.  -Low insulin level of 2 , c peptide within normal limits in dec 2019  - Work-up last month ruled out the usual endocrine causes (cosyntropin test was normal, insulin levels were low-normal when glucose was low  - tapered HC and will discontinue 10 mg of hydrocortisone on 1/24  - DDs likley malnutrition Vs Methadone induced  -As per ENDO no need of FS    # hypothyroidism  - c/w levothyroxine 100. TSH 14 (going down)  - OP f/u with endo    # Behavioral abnormalities  - Psych eval noted: patient deemed not suicidal and can make his own decisions  - He signed AMA on 1/11 but did not leave  - Seek more pain meds, already on methadone 80 q8 and oxycodone IR 30 q8. No more pain meds    # Acute normo anemia  - no active bleed  - keep active type and screen  - transfuse to keep Hb > 7  - Iron studies from Nov 2019 shows anemia of chronic disease + iron def  - Started Aranesp 20 mcg qweekly from today  - Ordered 1 PRBC today    # PVD s/p right side AKA and left below knee amputation  - incision sites look clean  - Removal of staples from LLE stump on 1/19    # h/o esophageal candidiasis  - Completed 14 days of fluconazole course,   - outpt GI fu    # opioid dependance  - cw methadone, 80 q8 and Oxycodone 30mg q8  - outpt clinic fu on discharge    # HTN (not on any medication)    # suspected folate / vit D / Vit b12 DEF  c/w cholecalceferol    # Pt looks malnourished  - Dietician eval noted  - started megace    # Sacral and buttock pressure sores  - Burn eval:  Healing sacral and buttock pressure sores--> no infection as per burn   - soap and water qd, santyl ointment and dakin solution wet to dry dressing , ABD pad dressing change qd  - no surgery needed    PLAN: Follow K, Mag, Bicarb, phosp, blood cultures, Goals of care discussion as he needs atleast 4-6 weeks of Abx, Echo,     DVT PPX: heparin  GI PPx: not indicated  Dispo: Requesting home services. ACUTE  Diet: K restricted  Activity: OOBTC.  CODE: DNR/DNI/no feeding tubes/no hemodialysis  Patient requested Hospice -

## 2020-01-21 NOTE — CHART NOTE - NSCHARTNOTEFT_GEN_A_CORE
Dr. Santiago (attending) was called as patient was had some suicidal ideation. In witness with the RN, Pt refused every antibiotics, blood draw and requested hospice. Consulted hospice and psychiatry. Discontinuing antibiotics and labs. Mr. Booker expressed suicide ideation to RN, RN called the resident and attending. Attending spoke to the patient and Pt refused every antibiotics, blood draw and requested hospice. Consulted hospice and psychiatry. Discontinuing antibiotics and labs.

## 2020-01-21 NOTE — PROGRESS NOTE ADULT - SUBJECTIVE AND OBJECTIVE BOX
MARGE BELLA  59y, Male  Allergy: sulfamethizole (Unknown)      CHIEF COMPLAINT: Septic Shock (2020 08:57)      INTERVAL EVENTS/HPI  - No acute events overnight  - T(F): , Max: 100.7 (20 @ 04:58)  - Denies any worsening symptoms  - Tolerating medication  - WBC Count: 6.67 (20 @ 07:42)  WBC Count: 8.45 (20 @ 11:41)  - Creatinine, Serum: 4.1 (20 @ 07:42)       ROS  General: Denies rigors, nightsweats  HEENT: Denies headache, rhinorrhea, sore throat, eye pain  CV: Denies CP, palpitations  PULM: Denies wheezing, hemoptysis  GI: Denies hematemesis, hematochezia, melena  : Denies discharge, hematuria  MSK: Denies arthralgias, myalgias  SKIN: Denies rash, lesions  NEURO: Denies paresthesias, weakness  PSYCH: Denies depression, anxiety    VITALS:  T(F): 100.7, Max: 100.7 (20 @ 04:58)  HR: 84  BP: 130/66  RR: 18Vital Signs Last 24 Hrs  T(C): 38.2 (2020 04:58), Max: 38.2 (2020 04:58)  T(F): 100.7 (2020 04:58), Max: 100.7 (2020 04:58)  HR: 84 (2020 04:58) (80 - 84)  BP: 130/66 (2020 04:58) (130/66 - 149/65)  BP(mean): --  RR: 18 (2020 04:58) (18 - 18)  SpO2: 98% (2020 04:58) (98% - 98%)    PHYSICAL EXAM:  Gen: chronically ill appearing NAD, resting in bed  HEENT: Normocephalic, atraumatic  Neck: supple, no lymphadenopathy  CV: Regular rate & regular rhythm  Lungs: decreased BS at bases, no fremitus  Abdomen: Soft, BS present  Ext: Warm, well perfused, b/l AKA  Neuro: non focal, awake  Skin: sacral decubitus L ischial decub clean   Lines: no phlebitis        FH: Non-contributory  Social Hx: Non-contributory    TESTS & MEASUREMENTS:                        8.7    6.67  )-----------( 195      ( 2020 07:42 )             26.7         137  |  104  |  50<H>  ----------------------------<  93  4.4   |  20  |  4.1<HH>    Ca    7.3<L>      2020 07:42  Phos  6.6       Mg     1.4         TPro  3.9<L>  /  Alb  1.3<L>  /  TBili  0.2  /  DBili  x   /  AST  10  /  ALT  6   /  AlkPhos  150<H>      eGFR if Non African American: 15 mL/min/1.73M2 (20 @ 07:42)  eGFR if : 18 mL/min/1.73M2 (20 @ 07:42)    LIVER FUNCTIONS - ( 2020 07:42 )  Alb: 1.3 g/dL / Pro: 3.9 g/dL / ALK PHOS: 150 U/L / ALT: 6 U/L / AST: 10 U/L / GGT: x               Culture - Urine (collected 20 @ 16:15)  Source: .Urine Clean Catch (Midstream)  Final Report (20 @ 07:41):    <10,000 CFU/mL Normal Urogenital Lyubov    Culture - Blood (collected 20 @ 07:24)  Source: .Blood None  Preliminary Report (20 @ 14:01):    No growth to date.    Culture - Blood (collected 20 @ 07:24)  Source: .Blood None  Preliminary Report (20 @ 14:01):    No growth to date.    Culture - Blood (collected 01-15-20 @ 15:45)  Source: .Blood Blood  Gram Stain (20 @ 11:20):    Growth in anaerobic bottle: Gram positive cocci in pairs, chains and    clusters  Final Report (20 @ 12:49):    Growth in anaerobic bottle: Methicillin resistant Staphylococcus aureus    ***Blood Panel PCR results on this specimen are available    approximately 3 hours after the Gram stain result.***    Gram stain, PCR, and/or culture results may not always    correspond due to difference in methodologies.    ************************************************************    This PCR assay was performed using [employer].    The following targets are tested for: Enterococcus,    vancomycin resistant enterococci, Listeria monocytogenes,    coagulase negative staphylococci, S. aureus,    methicillin resistant S. aureus, Streptococcus agalactiae    (Group B), S. pneumoniae, S. pyogenes (Group A),    Acinetobacter baumannii, Enterobacter cloacae, E. coli,    Klebsiella oxytoca, K. pneumoniae, Proteus sp.,    Serratia marcescens, Haemophilus influenzae,    Neisseria meningitidis, Pseudomonas aeruginosa, Candida    albicans, C. glabrata, C krusei, C parapsilosis,    C. tropicalis and the KPC resistance gene.  Organism: Blood Culture PCR  Methicillin resistant Staphylococcus aureus (20 @ 12:49)  Organism: Methicillin resistant Staphylococcus aureus (20 @ 12:49)      -  Ampicillin/Sulbactam: R 16/8      -  Cefazolin: R >16      -  Clindamycin: R >4      -  Daptomycin: S 1      -  Erythromycin: R >4      -  Gentamicin: S <=1 Should not be used as monotherapy      -  Linezolid: S 2      -  Oxacillin: R >2      -  Penicillin: R >8      -  RIF- Rifampin: S <=1 Should not be used as monotherapy      -  Tetra/Doxy: S 2      -  Trimethoprim/Sulfamethoxazole: S <=0.5/9.5      -  Vancomycin: S 1      Method Type: ALLISON  Organism: Blood Culture PCR (20 @ 12:49)      -  Methicillin resistant Staphylococcus aureus (MRSA): Detec      Method Type: PCR        Blood Gas Venous - Lactate: 0.9 mmoL/L (20 @ 11:53)      INFECTIOUS DISEASES TESTING  MRSA PCR Result.: Positive (19 @ 18:50)      RADIOLOGY & ADDITIONAL TESTS:  I have personally reviewed the last Chest xray  CXR      CT      CARDIOLOGY TESTING  Transthoracic Echocardiogram:    EXAM:  2-D ECHO (TTE) COMPLETE        PROCEDURE DATE:  2020      INTERPRETATION:  REPORT:    TRANSTHORACIC ECHOCARDIOGRAM REPORT         Patient Name:   MARGE BELLA Accession #: [id number]  Medical Rec #:  [id number]   Height:      71.0 in 180.3 cm  YOB: 1960  Weight:      140.0 lb 63.50 kg  Patient Age:    59 years   BSA:         1.81 m²  Patient Gender: M          BP:          102/59 mmHg       Date of Exam:        2020 8:55:19 AM  Referring Physician: [id number] RANCHO REGALADO  Sonographer:         JAVIER  Reading Physician:   Rickie Clemens M.D.    Procedure:   2D Echo/Doppler/Color Doppler Complete.  Indications: I33.0 - Infective Endocarditis  Diagnosis:   Acute and subacute infective endocarditis - I33.0         Summary:   1. Normal global left ventricular systolic function.   2. Severe mitral annular calcification.   3. Thickening of the anterior and posterior mitral valve leaflets.   4. Sclerotic aortic valve with normal opening.   5. Mitral valve mean gradient is 4.5 mmHg consistent with mild mitral stenosis.    PHYSICIAN INTERPRETATION:  Left Ventricle: The left ventricular internal cavity size is normal. Left ventricular wall thickness is normal. Global LV systolic function was normal. Normal segmental left ventricular systolic function.  Left Atrium: Normal left atrial size.  Right Atrium: Normal right atrial size.  Pericardium: There is no evidence of pericardial effusion.  Mitral Valve: Thickening of the anterior and posterior mitral valve leaflets. There is severe mitral annular calcification. Peak transmitral mean gradient equals 4.5 mmHg, calculated mitral valve area by pressure half time equals 2.03 cm² consistent with No evidence of mitral stenosis. Mild mitral valve regurgitation is seen.Mitral valve mean gradient is 4.5 mmHg consistent with mild mitral stenosis.  Tricuspid Valve: The tricuspid valve is normal in structure. Mild tricuspid regurgitation is visualized.  Aortic Valve: The aortic valve is trileaflet. No evidence of aortic stenosis. Sclerotic aortic valve with normal opening. No evidence of aortic valve regurgitation is seen.  Aorta: The aortic root is normal in size and structure.       2D AND M-MODE MEASUREMENTS (normal ranges within parentheses):  Left    Normal   Aorta/Left             Normal  Ventricle:                     Atrium:  IVSd (2D):  1.27 cm  (0.7-1.1) AoV Cusp       1.63  (1.5-2.6)  LVPWd (2D): 1.00 cm  (0.7-1.1) Separation:     cm  LVIDd (2D): 4.34 cm  (3.4-5.7) Left Atrium    3.37  (1.9-4.0)  LVIDs (2D): 3.05 cm            (Mmode):        cm  LV FS (2D):  29.8 %   (>25%)   LA Volume      31.7  Relative      0.46    (<0.42)  Index         ml/m²  Wall                           Right  Thickness                      Ventricle:                            RVd (2D):      2.77 cm    SPECTRAL DOPPLER ANALYSIS:  LV DIASTOLIC FUNCTION:  MV Peak E: 0.84 m/s Decel Time: 374 msec  MV Peak A: 1.20 m/s  E/A Ratio: 0.70    Aortic Valve:  AoV VMax:    1.62 m/s  AoV Area, Vmax: 2.76 cm² Vmax Indx: 1.52 cm²/m²  AoV VTI:     0.28 m    AoV Area, VTI:  3.27 cm² VTI Indx:  1.80 cm²/m²  AoV Pk Grad: 10.5 mmHg  AoV Mn Grad: 5.7 mmHg    LVOT Vmax: 1.51 m/s  LVOT VTI:  0.31 m  LVOT Diam: 1.94 cm    Mitral Valve:  MV VMax:    1.61 m/s MV P1/2 Time: 108.54 msec  MV Mn Grad: 4.5 mmHg MV Area, PHT: 2.03 cm²    Tricuspid Valve and PA/RV Systolic Pressure: TR Max Velocity: 1.25 m/s RA Pressure:  RVSP/PASP: 9.3 mmHg    Pulmonic Valve:  PV Max Velocity: 1.20 m/s PV Max P.8 mmHg PV Mean PG:      [id number] Rickie Clemens M.D., Electronically signed on 2020 at 12:20:32 PM              *** Final ***                    RICKIE CLEMENS MD  This document has been electronically signed. 2020  8:55AM             (20 @ 08:55)  12 Lead ECG:   Ventricular Rate 97 BPM    Atrial Rate 97 BPM    P-R Interval 160 ms    QRS Duration 76 ms    Q-T Interval 354 ms    QTC Calculation(Bezet) 449 ms    P Axis 62 degrees    R Axis -17 degrees    T Axis 49 degrees    Diagnosis Line Normal sinus rhythm  Normal ECG    Confirmed by ELLIOT RIVERA MD ([id number]) on 1/10/2020 1:12:02 PM (01-10-20 @ 11:52)      MEDICATIONS  amitriptyline 25  calcium acetate 1334  chlorhexidine 4% Liquid 1  cholecalciferol 2000  collagenase Ointment 1  Dakins Solution - 1/2 Strength 1  DAPTOmycin IVPB 240  darbepoetin Injectable ViaL 20  heparin  Injectable 5000  hydrocortisone 10  influenza   Vaccine 0.5  levothyroxine 100  megestrol 20  methadone    Tablet 80  metroNIDAZOLE    Tablet 500  midodrine. 2.5  mupirocin 2% Nasal 1  oxybutynin 10  oxyCODONE    IR 30  polyethylene glycol 3350 17  sevelamer carbonate 800  sodium bicarbonate 1300      ANTIBIOTICS:  DAPTOmycin IVPB 240 milliGRAM(s) IV Intermittent every 48 hours  metroNIDAZOLE    Tablet 500 milliGRAM(s) Oral two times a day      All available historical records have been reviewed

## 2020-01-21 NOTE — PROGRESS NOTE ADULT - ASSESSMENT
ASSESSMENT  60 yo M PVD s/p right side AKA and left below knee amputation, paraplegia with multiple chronic bilateral buttock and sacral stage 4 ulcers, suprapubic catheter, HTN (not on any medication), CKD stage IV (not following with nephrology), hypothyroidism, opioid dependance, recent admission for cold left foot (chronic left foot dry gangrene, h/o esophageal candidiasis was sent to the rehab due to worsening kidney functions and hypoglycemia     IMPRESSION  #MRSA bacteremia suspect 2/2 sacral OM    1/15+, 1/16 NG    TTE no vegetation  #Sacral ulcer, infected, febrile 1/14 T101    BCX NG    Sepsis on admission P>90 WBC 18    10/2019 wcx heel MRSA, ecoli (R fluoro, unasyn, gent, bactrim, I zosyn), enterococcus  #CKD   #Malnutrition BMI (kg/m2): 17.5  #Abx allergy: sulfamethizole (Unknown)    RECOMMENDATIONS  - Burn consult appreciated- no debridement  - Dapto 4mg/kg q48h IV while in-patient, would avoid vanc given crcl. cannot give zyvox as on methadone  - flagyl 500mg q8h IV   - Would treat for 4-6 weeks from cleared BCX. Can dose Vanc 500mg q72h and check levels. Would require PICC line if within GOC.   Limited PO options as zyvox/bactrim not options- no real data with other drugs such as doxy/rifampin (his isolate is R clinda). If change off methadone, can use zyvox  - Palliative Care  - Trend WBC, Cr    Spectra [id number]

## 2020-01-22 NOTE — BEHAVIORAL HEALTH ASSESSMENT NOTE - HPI (INCLUDE ILLNESS QUALITY, SEVERITY, DURATION, TIMING, CONTEXT, MODIFYING FACTORS, ASSOCIATED SIGNS AND SYMPTOMS)
Mr Booker is a 59 year old man with no history of a psychiatric illness who was admitted to the medical floor for the management of osteomyelitis, MRSA and septicemia. Psychiatry consult was called for the evaluation of possible suicidal ideations as patient's mother is concerned that if patient is discharged home alone, he will overdose on his narcotic pain medication to end his life.b As per medical team, patient is refusing IV lines and medications.   on approach of the patient, he was lying in bed, IV antibiotics in place.   Patient reports that he is depressed because he is unclear as to what the plan is for him. he reports that the medical team continues to give him confliction information with regards to his antibiotics , and his need for dyalyisis. he reports that he is tired and confused and wants to go home. When informed that the reason for the consult was for the evaluation of his muscicidal thoughts , patient states " no. he reports that he has no intention iof ending his life. patient states " too many people will be hurt".   Patient states " I told them, give me a doctor, who has been on a wheel chair for 37 year and is going through wheat I am going through, then I will talk to them". he reports feeling unhappy about his medical stressors and chronic insomnia however denies suicidal thoughts , intent or plan.  Patient denies acute symptom of psychosis and adrienne. he denies current use of illicit drugs or alcohol.       Collateral information was obtained from patient mother Bianka Booker ( [phone]) . She reports that patient has never informed her that he wants to end his life however , if he is discharged home with no help, he will be very miserable and since he has a lot of pain medication at home, she thinks he will just overdose on medication to kill himself. She states " He used to be a strong big mayco, look at him now , he would not want to live like that". When asked if patient has attempted suicide in the past , patient's mother states " I don't know, there was a day a few years ago that we found him passed out, he had taken too much of his pain medication". When patient was asked about the incident that his mother was describing, Patient state " it was an accident, I was not trying to kill myself". patient's mother reports that the concern for suicide started because some one in the team came to offer him hospice , She states " Someone came to ask him how he wants to die, can you imagine, they are also giving him different information, one doctor satys one thing , the other comes in and says something else about the same thing".   Patient and his mother were offered a  family meeting with the medical team and they were agreeable.

## 2020-01-22 NOTE — CHART NOTE - NSCHARTNOTEFT_GEN_A_CORE
Registered Dietitian Follow-Up    ***Scroll to the bottom for RD recommendation***    Patient Profile Reviewed                           Yes [x]   No []  Nutrition History Previously Obtained        Yes []  No [x]          PERTINENT SUBJECTIVE INFORMATION (LATEST AS OF TODAY):  - SPOKEN WITH PATIENT TODAY, obtained most nutrition hx - pt says he used to eat fine at home but has always given up on taking medication/ regimen given by his PMD. He has tried most supplemental shakes and dislike them all. Other than that, no further nutrition hx provided. He did admit to taking Marinol in the past and it helped with his appetite but was told to stop due to declining kidney function.   - pt says his appetite now is poor with intermittent vomiting, no nausea.   - otherwise no oral issue.         PERTINENT MEDICAL INFORMATIONS:   (1) P/w Elevated K and hypoglycemia. Sepsis POA MRSA bacteremia.   (2) Sacral + buttlock pressure ulcer. BURN eval. No surgery needed.   (3) Esophageal candidosis with erosive gastritis. s/p EGD.   (4) HgbA1c 4.8, PVD with b/l BKA.  (5) C/w Megace for decrease PO.   (6) NPO 1/22 for CT        DIET ORDER:   RENAL dialysis         ANTHROPOMETRICS:  - Ht.  185.42cm  - Wt.   (1/11): 60.1kg - no new weight  - BMI. 19.6  - IBW. 73.9kg (adjusted) for b/l BKA       PERTINENT LAB DATA: 1/21: h/h 8.7/26.7, BUN 50, Cr 4.1, Ca 7.3, Albumin 1.3, GFR 15, Mag 1.4  PERTINENT MEDS:  heparin, phoslo, methadone, megestrol, oxy, miralax, acetaminophen, Nabicarb, vitamin D3, levothyroxine, renvela.       PHYSICAL FINDINGS  - APPEARANCE:        alert and oriented. no edema noted  - GI FUNCTION:        pt admits to normal BMs  - TUBES:                       - ORAL/MOUTH:      none reported, just lack of appetite  - SKIN:                        stage 2 to L IT, and stage 4 to sacrum b/l.         NUTRITION REQUIREMENTS  WEIGHT USED:                          60.1kg ABW (continued from RD initial assessment 1/12)  ESTIMATED ENERGY NEEDS:       CONTINUE [ x ]      ADJUST [  ]    ESTIMATED ENERGY NEEDS:         1803 - 2103 kcals/day (30-35 kcals/kg for multiple PU)  ESTIMATED PROTEIN NEEDS:        54 - 66 gms/day (0.9 - 1.1 gm/kg for PU and CKD4 not on HD)  ESTIMATED FLUID NEEDS:             1ml/kcal or per LIP     CURRENT NUTRIENT NEEDS:     NOT MEETING          [  x] PREVIOUS NUTRITION DIAGNOSIS:   (1) increased nutrient needs             [ x ] ONGOING        [  ] RESOLVED    NUTRITION DIAGNOSTIC #2 (NEW)  PROBLEM:                   (2) Inadequate oral intake  ETIOLOGY:                   decreased appetite/po 2/2 acuity of illness v.s. underlying comorbidity   SIGN/SYMPTOMS:       consuming less than 50% of meals consistently reported      PATIENT INTERVENTION:    [ x ] ORAL        [ ] EN/TF     GOAL/EXPECTED OUTCOME:     pt to consume and tolerate >75% of all meals and snacks upon f/u in 3 days.   INDICATOR/MONITORING:       RD to monitor diet order, energy intake, body composition, nutrition focused physical findings (appetite, PO tolerance, skin)  NUTRITION INTERVENTION:        Meals and snacks.     RECS: (1) Although current diet Renal Dialysis is for someone who is on Dialysis diet, but pt has been consistently demonstrating poor appetite/po despite the use of Megestrol. Pt also stating that he does not like all supplemental shakes and is hesitating to try Marinol (another appetite stimulant that used to work well on him) due to him being told to stop taking Marinol d/t kidney issue. Please consult renal to provide input regarding interaction if changing to Marinol. Otherwise, Continue current diet for now, no intervention can be made at this point. Registered Dietitian Follow-Up    ***Scroll to the bottom for RD recommendation***    Patient Profile Reviewed                           Yes [x]   No []  Nutrition History Previously Obtained        Yes []  No [x]          PERTINENT SUBJECTIVE INFORMATION (LATEST AS OF TODAY):  - SPOKEN WITH PATIENT TODAY, obtained most nutrition hx - pt says he used to eat fine at home but has always given up on taking medication/ regimen given by his PMD. He has tried most supplemental shakes and dislike them all. Other than that, no further nutrition hx provided. He did admit to taking Marinol in the past and it helped with his appetite but was told to stop due to declining kidney function.   - pt says his appetite now is poor with intermittent vomiting, no nausea.   - otherwise no oral issue.         PERTINENT MEDICAL INFORMATIONS:   (1) P/w Elevated K and hypoglycemia. Sepsis POA MRSA bacteremia.   (2) Sacral + buttlock pressure ulcer. BURN eval. No surgery needed.   (3) Esophageal candidosis with erosive gastritis. s/p EGD.   (4) HgbA1c 4.8, PVD with b/l BKA.  (5) C/w Megace for decrease PO.         DIET ORDER:   RENAL dialysis         ANTHROPOMETRICS:  - Ht.  185.42cm  - Wt.   (1/11): 60.1kg - no new weight  - BMI. 19.6  - IBW. 73.9kg (adjusted) for b/l BKA       PERTINENT LAB DATA: 1/21: h/h 8.7/26.7, BUN 50, Cr 4.1, Ca 7.3, Albumin 1.3, GFR 15, Mag 1.4  PERTINENT MEDS:  heparin, phoslo, methadone, megestrol, oxy, miralax, acetaminophen, Nabicarb, vitamin D3, levothyroxine, renvela.       PHYSICAL FINDINGS  - APPEARANCE:        alert and oriented. no edema noted  - GI FUNCTION:        pt admits to normal BMs  - TUBES:                       - ORAL/MOUTH:      none reported, just lack of appetite  - SKIN:                        stage 2 to L IT, and stage 4 to sacrum b/l.         NUTRITION REQUIREMENTS  WEIGHT USED:                          60.1kg ABW (continued from RD initial assessment 1/12)  ESTIMATED ENERGY NEEDS:       CONTINUE [ x ]      ADJUST [  ]    ESTIMATED ENERGY NEEDS:         1803 - 2103 kcals/day (30-35 kcals/kg for multiple PU)  ESTIMATED PROTEIN NEEDS:        54 - 66 gms/day (0.9 - 1.1 gm/kg for PU and CKD4 not on HD)  ESTIMATED FLUID NEEDS:             1ml/kcal or per LIP     CURRENT NUTRIENT NEEDS:     NOT MEETING          [  x] PREVIOUS NUTRITION DIAGNOSIS:   (1) increased nutrient needs             [ x ] ONGOING        [  ] RESOLVED    NUTRITION DIAGNOSTIC #2 (NEW)  PROBLEM:                   (2) Inadequate oral intake  ETIOLOGY:                   decreased appetite/po 2/2 acuity of illness v.s. underlying comorbidity   SIGN/SYMPTOMS:       consuming less than 50% of meals consistently reported      PATIENT INTERVENTION:    [ x ] ORAL        [ ] EN/TF     GOAL/EXPECTED OUTCOME:     pt to consume and tolerate >75% of all meals and snacks upon f/u in 3 days.   INDICATOR/MONITORING:       RD to monitor diet order, energy intake, body composition, nutrition focused physical findings (appetite, PO tolerance, skin)  NUTRITION INTERVENTION:        Meals and snacks.     RECS: (1) Although current diet Renal Dialysis is for someone who is on Dialysis diet, but pt has been consistently demonstrating poor appetite/po despite the use of Megestrol. Pt also stating that he does not like all supplemental shakes and is hesitating to try Marinol (another appetite stimulant that used to work well on him) due to him being told to stop taking Marinol d/t kidney issue. Please consult renal to provide input regarding interaction if changing to Marinol. Otherwise, Continue current diet for now, no intervention can be made at this point.

## 2020-01-22 NOTE — PROGRESS NOTE ADULT - SUBJECTIVE AND OBJECTIVE BOX
LENGTH OF HOSPITAL STAY: 12d    CHIEF COMPLAINT:   Patient is a 59y old  Male who presents with a chief complaint of Septic Shock (21 Jan 2020 08:57)      Overnight events:    No acute events overnight    ALLERGIES:  sulfamethizole (Unknown)    MEDICATIONS:  STANDING MEDICATIONS  amitriptyline 25 milliGRAM(s) Oral at bedtime  calcium acetate 1334 milliGRAM(s) Oral three times a day with meals  chlorhexidine 4% Liquid 1 Application(s) Topical <User Schedule>  cholecalciferol 2000 Unit(s) Oral daily  collagenase Ointment 1 Application(s) Topical two times a day  Dakins Solution - 1/2 Strength 1 Application(s) Topical two times a day  darbepoetin Injectable ViaL 20 MICROGram(s) SubCutaneous every week  doxycycline IVPB      doxycycline IVPB 100 milliGRAM(s) IV Intermittent once  doxycycline IVPB 100 milliGRAM(s) IV Intermittent every 12 hours  heparin  Injectable 5000 Unit(s) SubCutaneous every 12 hours  hydrocortisone 10 milliGRAM(s) Oral daily  influenza   Vaccine 0.5 milliLiter(s) IntraMuscular once  levothyroxine 100 MICROGram(s) Oral daily  megestrol 400 milliGRAM(s) Oral daily  methadone    Tablet 80 milliGRAM(s) Oral every 8 hours  midodrine. 2.5 milliGRAM(s) Oral three times a day  oxybutynin 10 milliGRAM(s) Oral two times a day  oxyCODONE    IR 30 milliGRAM(s) Oral every 8 hours  polyethylene glycol 3350 17 Gram(s) Oral at bedtime  sevelamer carbonate 800 milliGRAM(s) Oral three times a day with meals  sodium bicarbonate 1300 milliGRAM(s) Oral three times a day    PRN MEDICATIONS  acetaminophen   Tablet .. 650 milliGRAM(s) Oral every 6 hours PRN  diazepam    Tablet 5 milliGRAM(s) Oral two times a day PRN    VITALS:   T(F): 100.7  HR: 92  BP: 129/61  RR: 18  SpO2: 94%    LABS:                        8.7    6.67  )-----------( 195      ( 21 Jan 2020 07:42 )             26.7     01-21    137  |  104  |  50<H>  ----------------------------<  93  4.4   |  20  |  4.1<HH>    Ca    7.3<L>      21 Jan 2020 07:42  Phos  6.6     01-21  Mg     1.4     01-21    TPro  3.9<L>  /  Alb  1.3<L>  /  TBili  0.2  /  DBili  x   /  AST  10  /  ALT  6   /  AlkPhos  150<H>  01-21              Culture - Blood (collected 20 Jan 2020 11:41)  Source: .Blood None  Preliminary Report (21 Jan 2020 22:01):    No growth to date.    Culture - Blood (collected 20 Jan 2020 11:41)  Source: .Blood None  Preliminary Report (21 Jan 2020 22:01):    No growth to date.            Cultures:    Culture - Blood (collected 20 Jan 2020 11:41)  Source: .Blood None  Preliminary Report (21 Jan 2020 22:01):    No growth to date.    Culture - Blood (collected 20 Jan 2020 11:41)  Source: .Blood None  Preliminary Report (21 Jan 2020 22:01):    No growth to date.        RADIOLOGY:    PHYSICAL EXAM:  GEN: No acute distress, pale, NC/AT  LUNGS: Clear to auscultation bilaterally, no wheezes   HEART: S1/S2 present. RRR. No murmurs  ABD: Soft, non-tender, non-distended. Bowel sounds present  EXT: L BKA, R AKA. Sacral ulcers and lower back ulcer, foul smelling with surrounding erythema  NEURO: AAOX3    Intravenous access: No IV access  NG tube: None  Supra pubic Catheter: Placed (patient change by himself, no need of urology)

## 2020-01-22 NOTE — BEHAVIORAL HEALTH ASSESSMENT NOTE - NSBHCONSULTFOLLOWAFTERCARE_PSY_A_CORE FT
Patient can be referred to [facility] upon discharge from the medical floor for supportive psychotherapy at home.

## 2020-01-22 NOTE — BEHAVIORAL HEALTH ASSESSMENT NOTE - NSBHCHARTREVIEWLAB_PSY_A_CORE FT
8.7    6.67  )-----------( 195      ( 21 Jan 2020 07:42 )             26.7       01-21    137  |  104  |  50<H>  ----------------------------<  93  4.4   |  20  |  4.1<HH>    Ca    7.3<L>      21 Jan 2020 07:42  Phos  6.6     01-21  Mg     1.4     01-21    TPro  3.9<L>  /  Alb  1.3<L>  /  TBili  0.2  /  DBili  x   /  AST  10  /  ALT  6   /  AlkPhos  150<H>  01-21

## 2020-01-22 NOTE — PROGRESS NOTE ADULT - SUBJECTIVE AND OBJECTIVE BOX
Patient is a 59y old  Male who presents with a chief complaint of elevated potassium and hypoglycemia (15 Toni 2020 16:23)    Patient was seen and examined.  Denies any complaints today  As per staff and  pt's mother stated  pt has had suicidal ideations for  the last few months.  when I discussed with patient's mother - she states Almas has told her that once he is discharged, he will take all his pain meds and go to sleep.  I had a discussion with the patient , and he denied any suicidal thoughts/ plans.    PAST MEDICAL & SURGICAL HISTORY:  Anemia  PAD (peripheral artery disease)  Hypertension  Suprapubic catheter  Pressure ulcer  Diabetes  Lumbar compression fracture  S/P below knee amputation, left  S/P below knee amputation, right  S/P debridement  Toe amputation status, right  H/O hernia repair    Allergies  sulfamethizole (Unknown)    MEDICATIONS  (STANDING):  amitriptyline 25 milliGRAM(s) Oral at bedtime  calcium acetate 1334 milliGRAM(s) Oral three times a day with meals  chlorhexidine 4% Liquid 1 Application(s) Topical <User Schedule>  cholecalciferol 2000 Unit(s) Oral daily  collagenase Ointment 1 Application(s) Topical two times a day  Dakins Solution - 1/2 Strength 1 Application(s) Topical two times a day  darbepoetin Injectable ViaL 20 MICROGram(s) SubCutaneous every week  heparin  Injectable 5000 Unit(s) SubCutaneous every 12 hours  hydrocortisone 10 milliGRAM(s) Oral daily  influenza   Vaccine 0.5 milliLiter(s) IntraMuscular once  levothyroxine 100 MICROGram(s) Oral daily  megestrol 400 milliGRAM(s) Oral daily  methadone    Tablet 80 milliGRAM(s) Oral every 8 hours  midodrine. 2.5 milliGRAM(s) Oral three times a day  oxybutynin 10 milliGRAM(s) Oral two times a day  oxyCODONE    IR 30 milliGRAM(s) Oral every 8 hours  polyethylene glycol 3350 17 Gram(s) Oral at bedtime  sevelamer carbonate 800 milliGRAM(s) Oral three times a day with meals  sodium bicarbonate 1300 milliGRAM(s) Oral three times a day    MEDICATIONS  (PRN):  acetaminophen   Tablet .. 650 milliGRAM(s) Oral every 6 hours PRN Temp greater or equal to 38.5C (101.3F), Mild Pain (1 - 3)  diazepam    Tablet 5 milliGRAM(s) Oral two times a day PRN anxiety    T(C): 38.2 (01-22-20 @ 06:01), Max: 38.2 (01-22-20 @ 06:01)  HR: 92 (01-22-20 @ 06:01) (79 - 92)  BP: 129/61 (01-22-20 @ 06:01) (112/77 - 129/61)  RR: 18 (01-22-20 @ 06:01) (18 - 19)  SpO2: 94% (01-22-20 @ 06:01) (94% - 99%)    O/E:  Awake, alert, not in distress.  HEENT: atraumatic, EOMI.  Chest: clear.  CVS: SIS2 +, no murmur.  P/A: Soft, BS+  CNS: non focal.  Ext: B/l BKA   Skin: b/l buttock and sacral ulcers  All systems reviewed positive findings as above.                                 8.7<L>  6.67  )-----------( 195      ( 21 Jan 2020 07:42 )             26.7<L>  01-21    137  |  104  |  50<H>  ----------------------------<  93  4.4   |  20  |  4.1<HH>    Ca    7.3<L>      21 Jan 2020 07:42  Phos  6.6     01-21  Mg     1.4     01-21    TPro  3.9<L>  /  Alb  1.3<L>  /  TBili  0.2  /  DBili  x   /  AST  10  /  ALT  6   /  AlkPhos  150<H>  01-21

## 2020-01-22 NOTE — BEHAVIORAL HEALTH ASSESSMENT NOTE - RISK ASSESSMENT
Low Acute Suicide Risk Risk factors for suicide in this patient are chronic medical problems, chronic pain, current mood episode , however, patient does not have a past suicide attempt, is not an active illicit drug user, has good social support on his family and friends

## 2020-01-22 NOTE — PROGRESS NOTE ADULT - ASSESSMENT
59 years old male known to have PVD s/p right side bKA and left below knee amputation, paraplegia with multiple chronic bilateral buttock and sacral stage 4 ulcers, suprapubic catheter, HTN (not on any medication), CKD stage IV (not following with nephrology), hypothyroidism, opioid dependance,  h/o esophageal candidiasis was sent from rehab due to worsening kidney functions and hypoglycemia.   Pt only states he had cloudy urine for past few days. states even if needed he will not go for hemodialysis. (10 Toni 2020 21:52)    # Suicidal ideation  - Psychiatry consulted    # Sepsis POA  #  MRSA bactremia  - Blood culture:  Methicillin resistant Staphylococcus aureus (MRSA): Detec (01.15.20 @ 15:45)  - blood Culture Results: No growth to date. (01.16.20 @ 07:24)  - MRSA PCR Result.: Positive: By: Real-Time PCR (Polymerase Reaction Method) (12.02.19 @ 18:50)  -  Xray Chest 1 View AP/PA (01.10.20 @ 14:04) >No consolidation, effusion or pneumothorax.  - echo: no vegetation seen  - Pt refused blood draws and doesnot want to be on IV antibiotics.  - Discussed with ID , will start him on doxycycline 100mg  PO q12 for 4 weeks    # Sacral and buttock pressure sores  - Burn eval:  Healing sacral and buttock pressure sores--> no infection  - soap and water qd, santyl ointment and dakin solution wet to dry dressing , ABD pad dressing change qd  - no surgery needed    # Esophageal candidiasis with erosive gastritis  - EGD (12.27.19 @ 09:30) >esophagitis in the lower third of the esophagus compatible with non-erosive esophagitis. (Biopsy).  Ulcers in the upper third of the esophagus and middle third of the esophagus.(Biopsy). Erosions in the antrum compatible with erosive gastritis. (Biopsy). Erythema in the stomach body and fundus compatible with non-erosive gastritis.(Biopsy).   - Surgical Pathology Report (12.27.19 @ 12:30)  Esophageal ulcers, biopsy: Fragments of esophageal squamous mucosa, with Candida esophagitis (see Comment). GMS stain highlights the budding yeastlike fungi andpseuohyphae of Candida.  Mid esophagus, biopsy:- Fragments of esophageal squamous mucosa with mild nonspecific chronic inflammation.  - completed course with  flucanazole  - c/w protonix    # Asymptomatic Hypoglycemia, likely due to chronic malnutrition vs methadone (some case reports about hypoglycemia)  -HbA1c 4.8 (1/11/2020)  - Endocrine F/u :It appears that the patient's mild-moderate hypoglycemia is related to markedly reduced muscle mass and may in part be related to opiates.  Would encourage nutrition, use appetite stimulants.    # PVD with B/l BKA  - vascular surgery F/u : removed staples from LLE stump. Incision was well healed and staples were easily remov    # Hypomagnesemia  - repleted with Mg    # Decrease PO intake   - c/w megace    # CKD stage 4, acidosis  - refused dialysis  - c/w phoslo, renagel  - c/w sodium bicarb    # Hypothyroidism  - Thyroid Stimulating Hormone, Serum: 14.10 uIU/mL (01.13.20 @ 11:29)  - c/w synthroid  -please check TSH as an OP to decide on dosage adjustments    # No evidence of adrenal insufficiency  - Work-up last month ruled out the usual endocrine causes (cosyntropin test was normal, insulin levels were low-normal when glucose was low  - taper and discontinue  hydrocortisone    # Vit D def  - F/u Vit D levels  -c/w cholecalciferol    # Chronic opiate dependence   - follows up with outpt  pain management dr Mathew c/w his home meds    # DVT prophylaxis    # GOC discussed: Pt wants to be DNR/ DNI/ no feeding tube/no dialysis/ limited medical intervention/no blood draws, no IV antibiotics.  Hoespice F/u    # Pending: psych eval, hospice F/u   # Discussed with patient plan of care.   # Disposition: home with hospice

## 2020-01-22 NOTE — BEHAVIORAL HEALTH ASSESSMENT NOTE - SUMMARY
Mr Booker is a 59 year old man with no history of a psychiatric illness who was admitted to the medical floor for the management of osteomyelitis, MRSA and septicemia. Psychiatry consult was called for the evaluation of possible suicidal ideations as patient's mother is concerned that if patient is discharged home alone, he will overdose on his narcotic pain medication to end his life. Patient denies having suicidal thoughts , intent or plan at this time. He reports being dysphoric and confused as multiple doctors seem to be giving him different recommendations with regards to his goals of care. it seems that there is an ongoing communication problem as although the medical team seems to be adequately explaining the treatment plan to the patient, patient seems to be mis understanding the information that is being passed on to him. Patient appears to be depressed however , his depression seems to ne in the context of his severe medical problems, the associated disability , demoralization and fear of the unknown. Patient has no suicidal ideations, intent or plan for now. he denies having acute symptoms of psychosis or adrienne   At this time, it does not appear that patient is an imminent danger to himself or others and does not need inpatient psychiatric hospitalization. Patient and family will however benefit from a family meeting with writer to discuss the proposed treatment plan by the medical team and discuss patient's desire and his understanding of the plan. Patient was informed of the plan and was agreeable.

## 2020-01-22 NOTE — BEHAVIORAL HEALTH ASSESSMENT NOTE - NSBHCHARTREVIEWVS_PSY_A_CORE FT
Vital Signs Last 24 Hrs  T(C): 37.1 (22 Jan 2020 13:59), Max: 38.2 (22 Jan 2020 06:01)  T(F): 98.7 (22 Jan 2020 13:59), Max: 100.7 (22 Jan 2020 06:01)  HR: 79 (22 Jan 2020 13:59) (79 - 92)  BP: 116/63 (22 Jan 2020 13:59) (112/77 - 129/61)  BP(mean): --  RR: 19 (22 Jan 2020 13:59) (18 - 19)  SpO2: 94% (22 Jan 2020 06:01) (94% - 99%)

## 2020-01-22 NOTE — PROGRESS NOTE ADULT - ASSESSMENT
59 years old male known to have PVD s/p right side AKA and left below knee amputation, paraplegia with multiple chronic bilateral buttock and sacral stage 4 ulcers, suprapubic catheter, HTN (not on any medication), CKD stage IV (not following with nephrology), hypothyroidism, opioid dependance, recent admission for cold left foot (chronic left foot dry gangrene, h/o esophageal candidiasis was sent to the rehab due to worsening kidney functions and hypoglycemia with FS 45.    In the ED, pt remained hemodynamically stable but looks very pale, no active bleeding, lab workup showed leukocytosis of 19, normo anemia with Hb 7.7 (vbg 8.7), potassium 6.5 (hemolyzed) repeat 5.2, anion gap metabolic acidosis, worsening CKD 4 with creat 4.1 (baseline 3.5), , cxr ok.    1/21/2020: Pt refused every IV antibiotics and labs and want hospice. He showed suicidal ideation to his mother, Mother told the nurse. Consulted psychiatry    # Septic shock - Now vitally stable  - Had Fever of 101, hypotensive to the 60s systolic, blood pressure perked up after NS bolus and PRBC. lactate 1.4  - Leukocytosis improved from 27K to 6K. No fever in last 48 hours.  - Likely the source is sacral ulcer - chronic OM  - 1 blood culture on 1/15 was positive for MRSA. 1/16 negative, repeat BCx from 1/20 are pending  - Was on NS then was on D5 with HCO3 and now refusing fluids.   - Echo: No vegetations can bee seen on TTE  - Started Doxy 100mg bid  - Pt refused every IV antibiotics and labs and want hospice    # Suicidal ideation  - He showed suicidal ideation to his mother, Mother told the nurse.   - Consulted psychiatry    # CKD 4 - Creatinine stable around 4.4:  - Creatinine stable around 4.4 , no indication for RRT  - Nephro is following, will f/u OP  - Increased the dose of phosp binders today, please continue  - Pt refused hemodialysis    # Metabolic acidosis: Non anion gap metabolic acidosis, bicarb improved from 13 to 20.  - increased sodium bicarb to 1300 q 8  - s/p D5 with HCO3 - Now refusing Iv drip    # Hypoglycemia  -No evidence of adrenal insufficiency from blood work before in dec 2019 cosyntropin test negative, no need for hydrocortisone taper to 10mg for 1 week and stop it.  -Low insulin level of 2 , c peptide within normal limits in dec 2019  - Work-up last month ruled out the usual endocrine causes (cosyntropin test was normal, insulin levels were low-normal when glucose was low  - tapered HC and will discontinue 10 mg of hydrocortisone on 1/24  - DDs likley malnutrition Vs Methadone induced  - As per ENDO no need of FS    # hypothyroidism  - c/w levothyroxine 100. TSH 14 (going down)  - OP f/u with endo    # Behavioral abnormalities  - Psych eval noted: patient deemed not suicidal and can make his own decisions  - He signed AMA on 1/11 but did not leave  - Seek more pain meds, already on methadone 80 q8 and oxycodone IR 30 q8. No more pain meds    # Acute normo anemia  - no active bleed  - keep active type and screen  - transfuse to keep Hb > 7  - Iron studies from Nov 2019 shows anemia of chronic disease + iron def  - Started Aranesp 20 mcg q weekly from today    # PVD s/p right side AKA and left below knee amputation  - incision sites look clean  - Removal of staples from LLE stump on 1/19    # h/o esophageal candidiasis  - Completed 14 days of fluconazole course,   - outpt GI fu    # opioid dependance  - cw methadone, 80 q8 and Oxycodone 30mg q8  - outpt clinic fu on discharge    # HTN (not on any medication)    # suspected folate / vit D / Vit b12 DEF  c/w cholecalceferol    # Pt looks malnourished  - Dietician eval noted  - started megace    # Sacral and buttock pressure sores  - Burn eval:  Healing sacral and buttock pressure sores--> no infection as per burn   - soap and water qd, santyl ointment and dakin solution wet to dry dressing , ABD pad dressing change qd  - no surgery needed    PLAN: psych and Hospice consult    DVT PPX: heparin  GI PPx: not indicated  Dispo: Requesting home services. ACUTE  Diet: K restricted  Activity: OOBTC.  CODE: DNR/DNI/no feeding tubes/no hemodialysis  Patient requested Hospice

## 2020-01-23 NOTE — PROGRESS NOTE ADULT - SUBJECTIVE AND OBJECTIVE BOX
LENGTH OF HOSPITAL STAY: 13d    CHIEF COMPLAINT:   Patient is a 59y old  Male who presents with a chief complaint of Septic shock (22 Jan 2020 12:39)      Overnight events:    No acute events overnight    ALLERGIES:  sulfamethizole (Unknown)    MEDICATIONS:  STANDING MEDICATIONS  amitriptyline 25 milliGRAM(s) Oral at bedtime  calcium acetate 1334 milliGRAM(s) Oral three times a day with meals  chlorhexidine 4% Liquid 1 Application(s) Topical <User Schedule>  cholecalciferol 2000 Unit(s) Oral daily  collagenase Ointment 1 Application(s) Topical two times a day  Dakins Solution - 1/2 Strength 1 Application(s) Topical two times a day  darbepoetin Injectable ViaL 20 MICROGram(s) SubCutaneous every week  dextrose 10%. 250 milliLiter(s) IV Continuous <Continuous>  heparin  Injectable 5000 Unit(s) SubCutaneous every 12 hours  hydrocortisone 10 milliGRAM(s) Oral daily  influenza   Vaccine 0.5 milliLiter(s) IntraMuscular once  levothyroxine 100 MICROGram(s) Oral daily  megestrol Suspension 400 milliGRAM(s) Oral daily  methadone    Tablet 80 milliGRAM(s) Oral two times a day  midodrine. 2.5 milliGRAM(s) Oral three times a day  oxybutynin 10 milliGRAM(s) Oral two times a day  oxyCODONE    IR 30 milliGRAM(s) Oral every 8 hours  polyethylene glycol 3350 17 Gram(s) Oral at bedtime  sevelamer carbonate 800 milliGRAM(s) Oral three times a day with meals  sodium bicarbonate 1300 milliGRAM(s) Oral three times a day    PRN MEDICATIONS  acetaminophen   Tablet .. 650 milliGRAM(s) Oral every 6 hours PRN  diazepam    Tablet 5 milliGRAM(s) Oral two times a day PRN    VITALS:   T(F): 96.6  HR: 89  BP: 118/73  RR: 20  SpO2: --    LABS:                    Culture - Blood (collected 20 Jan 2020 11:41)  Source: .Blood None  Preliminary Report (21 Jan 2020 22:01):    No growth to date.    Culture - Blood (collected 20 Jan 2020 11:41)  Source: .Blood None  Preliminary Report (21 Jan 2020 22:01):    No growth to date.            Cultures:    Culture - Blood (collected 20 Jan 2020 11:41)  Source: .Blood None  Preliminary Report (21 Jan 2020 22:01):    No growth to date.    Culture - Blood (collected 20 Jan 2020 11:41)  Source: .Blood None  Preliminary Report (21 Jan 2020 22:01):    No growth to date.        RADIOLOGY:    PHYSICAL EXAM:  GEN: No acute distress  HEENT: SUJIT  LUNGS: Clear to auscultation bilaterally   HEART: S1/S2 present. RRR.   ABD: Soft, non-tender, non-distended. Bowel sounds present  EXT:  NEURO: AAOX3

## 2020-01-23 NOTE — PROGRESS NOTE ADULT - ASSESSMENT
59 years old male known to have PVD s/p right side bKA and left below knee amputation, paraplegia with multiple chronic bilateral buttock and sacral stage 4 ulcers, suprapubic catheter, HTN (not on any medication), CKD stage IV (not following with nephrology), hypothyroidism, opioid dependance,  h/o esophageal candidiasis was sent from rehab due to worsening kidney functions and hypoglycemia.   Pt only states he had cloudy urine for past few days. states even if needed he will not go for hemodialysis. (10 Toni 2020 21:52)    # Unresponsiveness probably  sec to methadone and Hypoglycemia-resolved  # Hypoglycemia, likely due to chronic malnutrition vs methadone (some case reports about hypoglycemia)  -HbA1c 4.8 (1/11/2020)  - Endocrine F/u :It appears that the patient's mild-moderate hypoglycemia is related to markedly reduced muscle mass and may in part be related to opiates.  Would encourage nutrition, use appetite stimulants.  - dose of methadone decreased  -instructed  to hold methadone and oxycodone , if pt is drowsy  - monitor FS  - C/w IV dextrose    # Suicidal ideation  - Psychiatry consulted: Patient denies having suicidal thoughts , intent or plan at this time. Patient appears to be depressed however , his depression seems to ne in the context of his severe medical problems, the associated disability , demoralization and fear of the unknown  - Psychiaty plan on : family meeting with pt and mother today    # Sepsis POA  #  MRSA bactremia  - Blood culture:  Methicillin resistant Staphylococcus aureus (MRSA): Detec (01.15.20 @ 15:45)  - blood Culture Results: No growth to date. (01.16.20 @ 07:24)  - MRSA PCR Result.: Positive: By: Real-Time PCR (Polymerase Reaction Method) (12.02.19 @ 18:50)  -  Xray Chest 1 View AP/PA (01.10.20 @ 14:04) >No consolidation, effusion or pneumothorax.  - echo: no vegetation seen  - Pt does not want  blood draws and doesnot want to be on IV antibiotics.  - c/w  doxycycline 100mg  PO q12 for 4 weeks      # Sacral and buttock pressure sores  - Burn eval:  Healing sacral and buttock pressure sores--> no infection  - soap and water qd, santyl ointment and dakin solution wet to dry dressing , ABD pad dressing change qd  - no surgery needed    # Esophageal candidiasis with erosive gastritis  - EGD (12.27.19 @ 09:30) >esophagitis in the lower third of the esophagus compatible with non-erosive esophagitis. (Biopsy).  Ulcers in the upper third of the esophagus and middle third of the esophagus.(Biopsy). Erosions in the antrum compatible with erosive gastritis. (Biopsy). Erythema in the stomach body and fundus compatible with non-erosive gastritis.(Biopsy).   - Surgical Pathology Report (12.27.19 @ 12:30)  Esophageal ulcers, biopsy: Fragments of esophageal squamous mucosa, with Candida esophagitis (see Comment). GMS stain highlights the budding yeastlike fungi andpseuohyphae of Candida.  Mid esophagus, biopsy:- Fragments of esophageal squamous mucosa with mild nonspecific chronic inflammation.  - completed course with  flucanazole  - c/w protonix      # PVD with B/l BKA  - vascular surgery F/u : removed staples from LLE stump. Incision was well healed and staples were easily remov    # Hypomagnesemia  - repleted with Mg    # Decrease PO intake   - c/w megace    # CKD stage 4, acidosis  - refused dialysis  - c/w phoslo, renagel  - c/w sodium bicarb    # Hypothyroidism  - Thyroid Stimulating Hormone, Serum: 14.10 uIU/mL (01.13.20 @ 11:29)  - c/w synthroid  -please check TSH as an OP to decide on dosage adjustments    # No evidence of adrenal insufficiency  - Work-up last month ruled out the usual endocrine causes (cosyntropin test was normal, insulin levels were low-normal when glucose was low  - taper and discontinue  hydrocortisone    # Vit D def  - F/u Vit D levels  -c/w cholecalciferol    # Chronic opiate dependence   - follows up with outpt  pain management dr Mathew c/w his home meds    # DVT prophylaxis    # GOC discussed: Pt wants to be DNR/ DNI/ no feeding tube/no dialysis/ limited medical intervention/no blood draws, no IV antibiotics.  Hospice F/u    # Pending: psych F/u , hospice F/u   # Discussed with patient plan of care.   # Disposition: home with hospice

## 2020-01-23 NOTE — PROGRESS NOTE BEHAVIORAL HEALTH - NSBHCONSULTFOLLOWAFTERCARE_PSY_A_CORE FT
Recommend referral to [facility], [address] [place] [address], Phone number; [phone] for supportive psychotherapy if patient is discharged home

## 2020-01-23 NOTE — PROGRESS NOTE ADULT - SUBJECTIVE AND OBJECTIVE BOX
Patient is a 59y old  Male who presents with a chief complaint of elevated potassium and hypoglycemia (15 Toni 2020 16:23)    Patient was seen and examined.  overnight Pt was unresponsive, FS was 23, rapid response was called . Pt received D50 and narcan and was kept on D10-->Pt woke up .  pt had 1 episode of vomiting  Pt awake, responding to qustions    PAST MEDICAL & SURGICAL HISTORY:  Anemia  PAD (peripheral artery disease)  Hypertension  Suprapubic catheter  Pressure ulcer  Diabetes  Lumbar compression fracture  S/P below knee amputation, left  S/P below knee amputation, right  S/P debridement  Toe amputation status, right  H/O hernia repair    Allergies  sulfamethizole (Unknown)    MEDICATIONS  (STANDING):  amitriptyline 25 milliGRAM(s) Oral at bedtime  calcium acetate 1334 milliGRAM(s) Oral three times a day with meals  chlorhexidine 4% Liquid 1 Application(s) Topical <User Schedule>  cholecalciferol 2000 Unit(s) Oral daily  collagenase Ointment 1 Application(s) Topical two times a day  Dakins Solution - 1/2 Strength 1 Application(s) Topical two times a day  darbepoetin Injectable ViaL 20 MICROGram(s) SubCutaneous every week  dextrose 10%. 250 milliLiter(s) (500 mL/Hr) IV Continuous <Continuous>  doxycycline hyclate Capsule 100 milliGRAM(s) Oral every 12 hours  heparin  Injectable 5000 Unit(s) SubCutaneous every 12 hours  hydrocortisone 10 milliGRAM(s) Oral daily  influenza   Vaccine 0.5 milliLiter(s) IntraMuscular once  levothyroxine 100 MICROGram(s) Oral daily  megestrol Suspension 400 milliGRAM(s) Oral daily  methadone    Tablet 60 milliGRAM(s) Oral three times a day  midodrine. 2.5 milliGRAM(s) Oral three times a day  oxybutynin 10 milliGRAM(s) Oral two times a day  oxyCODONE    IR 30 milliGRAM(s) Oral every 8 hours  polyethylene glycol 3350 17 Gram(s) Oral at bedtime  sevelamer carbonate 800 milliGRAM(s) Oral three times a day with meals  sodium bicarbonate 1300 milliGRAM(s) Oral three times a day      T(C): 35.9 (01-23-20 @ 05:42), Max: 37.3 (01-22-20 @ 23:00)  HR: 94 (01-23-20 @ 11:16) (77 - 94)  BP: 97/68 (01-23-20 @ 11:16) (97/68 - 191/104)  RR: 20 (01-23-20 @ 05:42) (18 - 20)  SpO2: --    O/E:  Awake,  not in distress.  HEENT: atraumatic, EOMI.  Chest: clear.  CVS: SIS2 +, no murmur.  P/A: Soft, BS+  CNS: non focal.  Ext: B/l BKA   Skin: b/l buttock and sacral ulcers  All systems reviewed positive findings as above.

## 2020-01-23 NOTE — CHART NOTE - NSCHARTNOTEFT_GEN_A_CORE
Rapid Reopens Called 0620 for nonresponsive Patient:   FS serum Glucose at bedside 23.  last Dose of methadone 80mg 2100 01/22/2020      Vital Signs (24 Hrs):  T(C): 35.9 (01-23-20 @ 05:42), Max: 37.3 (01-22-20 @ 23:00)  HR: 77 (01-22-20 @ 23:00) (77 - 79)  BP: 191/104 (01-23-20 @ 05:42) (113/68 - 191/104)  RR: 20 (01-23-20 @ 05:42) (18 - 20)  SpO2: 97% room air Rapid Reopens Called 0620 for nonresponsive Patient:   FS serum Glucose at bedside 23.  last Dose of methadone 80mg 2100 01/22/2020      Vital Signs (24 Hrs):  T(C): 35.9 (01-23-20 @ 05:42), Max: 37.3 (01-22-20 @ 23:00)  HR: 77 (01-22-20 @ 23:00) (77 - 79)  BP: 191/104 (01-23-20 @ 05:42) (113/68 - 191/104)  RR: 20 (01-23-20 @ 05:42) (18 - 20)  SpO2: 97% room air      Attending attestation:    RRT called on pt for nonresponsiveness. Pt is DNR/DNI. Found to be hypoglycemic with FS of 23. Pushed D50 and started on D10 drip. Pt also on high doses of opioids (Methadone and oxycodone). Pushed Narcan as well. Pt woke up and began vomiting. Following simple commands. He is yelling and using all extremities to push away nursing staff. Suction at bedside. Start pt on IV D10, cover for aspiration pneumonia, and hold opioid medications until pt is more awake.

## 2020-01-23 NOTE — PROGRESS NOTE BEHAVIORAL HEALTH - RISK ASSESSMENT
Patient is considered a chronic risk for suicide as he is an older  man, with multiple chronic , debilitating, medical problems and chronic pain with poor prognosis however, based on the information obtained, patient has no history of a psychiatric illness, has no past suicide attempt, has supportive family , does not abuse illicit drugs and currently has no suicidal ideations .

## 2020-01-23 NOTE — PROGRESS NOTE BEHAVIORAL HEALTH - NSBHCHARTREVIEWVS_PSY_A_CORE FT
Vital Signs Last 24 Hrs  T(C): 37.1 (23 Jan 2020 14:20), Max: 37.3 (22 Jan 2020 23:00)  T(F): 98.8 (23 Jan 2020 14:20), Max: 99.1 (22 Jan 2020 23:00)  HR: 90 (23 Jan 2020 14:20) (77 - 94)  BP: 119/64 (23 Jan 2020 14:20) (97/68 - 191/104)  BP(mean): --  RR: 18 (23 Jan 2020 14:20) (18 - 20)  SpO2: --

## 2020-01-23 NOTE — PROGRESS NOTE BEHAVIORAL HEALTH - NSBHFUPREASONCONS_PSY_A_CORE
Met with pt to discuss chemotherapy and resources available at the [facility]. Provided patient with  My Cancer Guidebook . Treatment has not yet been determined, therefore handouts on treatment drugs not given. Reviewed administration, side effects and ongoing symptom support management. Provided phone numbers for triage and after hours care. Highlighted steps to expect when getting chemotherapy if that is the chosen treatment (check in, labs, pre meds, infusion). Discussed that chemo may be delayed due to blood counts, infusion schedule or patient s need to modify. Reviewed most concerning symptoms that warrant an immediate call to the clinic nurse line.  
suicidality

## 2020-01-23 NOTE — PROGRESS NOTE ADULT - ASSESSMENT
59 years old male known to have PVD s/p right side AKA and left below knee amputation, paraplegia with multiple chronic bilateral buttock and sacral stage 4 ulcers, suprapubic catheter, HTN (not on any medication), CKD stage IV (not following with nephrology), hypothyroidism, opioid dependance, recent admission for cold left foot (chronic left foot dry gangrene, h/o esophageal candidiasis was sent to the rehab due to worsening kidney functions and hypoglycemia with FS 45.    In the ED, pt remained hemodynamically stable but looks very pale, no active bleeding, lab workup showed leukocytosis of 19, normo anemia with Hb 7.7 (vbg 8.7), potassium 6.5 (hemolyzed) repeat 5.2, anion gap metabolic acidosis, worsening CKD 4 with creat 4.1 (baseline 3.5), , cxr ok.    1/21/2020: Pt refused every IV antibiotics and labs and want hospice. He showed suicidal ideation to his mother, Mother told the nurse. Consulted psychiatry  1/23/2020: Pt was hypoglycemic overnight with 23 FS, D50 and narcan was pushed and D10 drip was started. Decreasing Methadone 80 from q8 to q12    # Septic shock - Now vitally stable  - Had Fever of 101, hypotensive to the 60s systolic, blood pressure perked up after NS bolus and PRBC. lactate 1.4  - Leukocytosis improved from 27K to 6K. No fever in last 48 hours.  - Likely the source is sacral ulcer - chronic OM  - 1 blood culture on 1/15 was positive for MRSA. 1/16 negative, repeat BCx from 1/20 are pending  - Was on NS then was on D5 with HCO3 and now refusing fluids.   - Echo: No vegetations can bee seen on TTE  - Started Doxy 100mg bid  - Pt refused every IV antibiotics and labs and want hospice    # Suicidal ideation  - He showed suicidal ideation to his mother, Mother told the nurse.   - Consulted psychiatry -> Low acute risk for suicide. An element of depression is present due to multiple comorbidities  - Recommended enhanced supervision but no psychiatric medication   - Today psychiatrist will meet with the patient and mother together    # CKD 4 - Creatinine stable around 4.4:  - Creatinine stable around 4.4 , no indication for RRT  - Nephro is following, will f/u OP  - Increased the dose of phosp binders today, please continue  - Pt refused hemodialysis    # Metabolic acidosis: Non anion gap metabolic acidosis, bicarb improved from 13 to 20.  - increased sodium bicarb to 1300 q 8  - s/p D5 with HCO3 - Now refusing Iv drip    # Hypoglycemia  -No evidence of adrenal insufficiency from blood work before in dec 2019 cosyntropin test negative, no need for hydrocortisone taper to 10mg for 1 week and stop it.  -Low insulin level of 2 , c peptide within normal limits in dec 2019  - Work-up last month ruled out the usual endocrine causes (cosyntropin test was normal, insulin levels were low-normal when glucose was low  - tapered HC and will discontinue 10 mg of hydrocortisone on 1/24  - DDs likley malnutrition Vs Methadone induced  - Decreasing Methadone 80 from q8 to q12  - As per ENDO no need of FS    # hypothyroidism  - c/w levothyroxine 100. TSH 14 (going down)  - OP f/u with endo    # Behavioral abnormalities  - Psych eval noted: patient deemed not suicidal and can make his own decisions  - He signed AMA on 1/11 but did not leave  - Seek more pain meds, already on methadone 80 q8 (Decreasing Methadone to q12) and oxycodone IR 30 q8. No more pain meds    # Acute normo anemia  - no active bleed  - keep active type and screen  - transfuse to keep Hb > 7  - Iron studies from Nov 2019 shows anemia of chronic disease + iron def  - Started Aranesp 20 mcg q weekly from today    # PVD s/p right side AKA and left below knee amputation  - incision sites look clean  - Removal of staples from LLE stump on 1/19    # h/o esophageal candidiasis  - Completed 14 days of fluconazole course,   - outpt GI fu    # opioid dependance  - cw methadone, 80 q8 and Oxycodone 30mg q8  - outpt clinic fu on discharge    # HTN (not on any medication)    # suspected folate / vit D / Vit b12 DEF  c/w cholecalceferol    # Pt looks malnourished  - Dietician eval noted  - started megace    # Sacral and buttock pressure sores  - Burn eval:  Healing sacral and buttock pressure sores--> no infection as per burn   - soap and water qd, santyl ointment and dakin solution wet to dry dressing , ABD pad dressing change qd  - no surgery needed    PLAN: psych and Hospice consult    DVT PPX: heparin  GI PPx: not indicated  Dispo: Requesting home services. ACUTE  Diet: K restricted  Activity: OOBTC.  CODE: DNR/DNI/no feeding tubes/no hemodialysis  Patient requested Hospice 59 years old male known to have PVD s/p right side AKA and left below knee amputation, paraplegia with multiple chronic bilateral buttock and sacral stage 4 ulcers, suprapubic catheter, HTN (not on any medication), CKD stage IV (not following with nephrology), hypothyroidism, opioid dependance, recent admission for cold left foot (chronic left foot dry gangrene, h/o esophageal candidiasis was sent to the rehab due to worsening kidney functions and hypoglycemia with FS 45.    In the ED, pt remained hemodynamically stable but looks very pale, no active bleeding, lab workup showed leukocytosis of 19, normo anemia with Hb 7.7 (vbg 8.7), potassium 6.5 (hemolyzed) repeat 5.2, anion gap metabolic acidosis, worsening CKD 4 with creat 4.1 (baseline 3.5), , cxr ok.    1/21/2020: Pt refused every IV antibiotics and labs and want hospice. He showed suicidal ideation to his mother, Mother told the nurse. Consulted psychiatry  1/23/2020: Pt was hypoglycemic overnight with 23 FS, D50 and narcan was pushed and D10 drip was started. Decreasing Methadone 80 from q8 to q12    # Septic shock - Now vitally stable  - Had Fever of 101, hypotensive to the 60s systolic, blood pressure perked up after NS bolus and PRBC. lactate 1.4  - Leukocytosis improved from 27K to 6K. No fever in last 48 hours.  - Likely the source is sacral ulcer - chronic OM  - 1 blood culture on 1/15 was positive for MRSA. 1/16 negative, repeat BCx from 1/20 are pending  - Was on NS then was on D5 with HCO3 and now refusing fluids.   - Echo: No vegetations can bee seen on TTE  - Started Doxy 100mg bid  - Pt refused every IV antibiotics and labs and want hospice    # Suicidal ideation  - He showed suicidal ideation to his mother, Mother told the nurse.   - Consulted psychiatry -> Low acute risk for suicide. An element of depression is present due to multiple comorbidities  - Recommended enhanced supervision but no psychiatric medication   - Today psychiatrist will meet with the patient and mother together    # CKD 4 - Creatinine stable around 4.4:  - Creatinine stable around 4.4 , no indication for RRT  - Nephro is following, will f/u OP  - Increased the dose of phosp binders today, please continue  - Pt refused hemodialysis    # Metabolic acidosis: Non anion gap metabolic acidosis, bicarb improved from 13 to 20.  - increased sodium bicarb to 1300 q 8  - s/p D5 with HCO3 - Now refusing Iv drip    # Hypoglycemia  -No evidence of adrenal insufficiency from blood work before in dec 2019 cosyntropin test negative, no need for hydrocortisone taper to 10mg for 1 week and stop it.  -Low insulin level of 2 , c peptide within normal limits in dec 2019  - Work-up last month ruled out the usual endocrine causes (cosyntropin test was normal, insulin levels were low-normal when glucose was low  - tapered HC and will discontinue 10 mg of hydrocortisone on 1/24  - DDs likley malnutrition Vs Methadone induced  - Decreasing Methadone 80 from q8 to q12  - As per ENDO no need of FS    # hypothyroidism  - c/w levothyroxine 100. TSH 14 (going down)  - OP f/u with endo    # Behavioral abnormalities  - Psych eval noted: patient deemed not suicidal and can make his own decisions  - He signed AMA on 1/11 but did not leave  - Seek more pain meds, already on methadone 80 q8 (Decreasing Methadone to q12) and oxycodone IR 30 q8. No more pain meds    # Acute normo anemia  - no active bleed  - keep active type and screen  - transfuse to keep Hb > 7  - Iron studies from Nov 2019 shows anemia of chronic disease + iron def  - Started Aranesp 20 mcg q weekly from today    # PVD s/p right side AKA and left below knee amputation  - incision sites look clean  - Removal of staples from LLE stump on 1/19    # h/o esophageal candidiasis  - Completed 14 days of fluconazole course,   - outpt GI fu    # opioid dependance  - cw methadone, 80 q12 and Oxycodone 30mg q8  - outpt clinic fu on discharge    # HTN (not on any medication)    # suspected folate / vit D / Vit b12 DEF  c/w cholecalceferol    # Pt looks malnourished  - Dietician eval noted  - started megace    # Sacral and buttock pressure sores  - Burn eval:  Healing sacral and buttock pressure sores--> no infection as per burn   - soap and water qd, santyl ointment and dakin solution wet to dry dressing , ABD pad dressing change qd  - no surgery needed    PLAN: psych and Hospice consult    DVT PPX: heparin  GI PPx: not indicated  Dispo: Requesting home services. ACUTE  Diet: K restricted  Activity: OOBTC.  CODE: DNR/DNI/no feeding tubes/no hemodialysis  Patient requested Hospice 59 years old male known to have PVD s/p right side AKA and left below knee amputation, paraplegia with multiple chronic bilateral buttock and sacral stage 4 ulcers, suprapubic catheter, HTN (not on any medication), CKD stage IV (not following with nephrology), hypothyroidism, opioid dependance, recent admission for cold left foot (chronic left foot dry gangrene, h/o esophageal candidiasis was sent to the rehab due to worsening kidney functions and hypoglycemia with FS 45.    In the ED, pt remained hemodynamically stable but looks very pale, no active bleeding, lab workup showed leukocytosis of 19, normo anemia with Hb 7.7 (vbg 8.7), potassium 6.5 (hemolyzed) repeat 5.2, anion gap metabolic acidosis, worsening CKD 4 with creat 4.1 (baseline 3.5), , cxr ok.    1/21/2020: Pt refused every IV antibiotics and labs and want hospice. He showed suicidal ideation to his mother, Mother told the nurse. Consulted psychiatry  1/23/2020: Pt was hypoglycemic overnight with 23 FS, D50 and narcan was pushed and D10 drip was started. Decreasing Methadone 80 q8 to Methadone 60 q8 PRN    # Septic shock - Now vitally stable  - Had Fever of 101, hypotensive to the 60s systolic, blood pressure perked up after NS bolus and PRBC. lactate 1.4  - Leukocytosis improved from 27K to 6K. No fever in last 48 hours.  - Likely the source is sacral ulcer - chronic OM  - 1 blood culture on 1/15 was positive for MRSA. 1/16 negative, repeat BCx from 1/20 are pending  - Was on NS then was on D5 with HCO3 and now refusing fluids.   - Echo: No vegetations can bee seen on TTE  - Started Doxy 100mg bid  - Pt refused every IV antibiotics and labs and want hospice    # Suicidal ideation  - He showed suicidal ideation to his mother, Mother told the nurse.   - Consulted psychiatry -> Low acute risk for suicide. An element of depression is present due to multiple comorbidities  - Recommended enhanced supervision but no psychiatric medication   - Today psychiatrist will meet with the patient and mother together    # CKD 4 - Creatinine stable around 4.4:  - Creatinine stable around 4.4 , no indication for RRT  - Nephro is following, will f/u OP  - Increased the dose of phosp binders today, please continue  - Pt refused hemodialysis    # Metabolic acidosis: Non anion gap metabolic acidosis, bicarb improved from 13 to 20.  - increased sodium bicarb to 1300 q 8  - s/p D5 with HCO3 - Now refusing Iv drip    # Hypoglycemia  -No evidence of adrenal insufficiency from blood work before in dec 2019 cosyntropin test negative, no need for hydrocortisone taper to 10mg for 1 week and stop it.  -Low insulin level of 2 , c peptide within normal limits in dec 2019  - Work-up last month ruled out the usual endocrine causes (cosyntropin test was normal, insulin levels were low-normal when glucose was low  - tapered HC and will discontinue 10 mg of hydrocortisone on 1/24  - DDs likley malnutrition Vs Methadone induced  - Decreasing Methadone 80 q8 to Methadone 60 q8 PRN  - As per ENDO no need of FS    # hypothyroidism  - c/w levothyroxine 100. TSH 14 (going down)  - OP f/u with endo    # Behavioral abnormalities  - Psych eval noted: patient deemed not suicidal and can make his own decisions  - He signed AMA on 1/11 but did not leave  - Seek more pain meds, already on methadone 80 q8 (Decreasing Methadone 80 q8 to Methadone 60 q8 PRN) and oxycodone IR 30 q8. No more pain meds    # Acute normo anemia  - no active bleed  - keep active type and screen  - transfuse to keep Hb > 7  - Iron studies from Nov 2019 shows anemia of chronic disease + iron def  - Started Aranesp 20 mcg q weekly from today    # PVD s/p right side AKA and left below knee amputation  - incision sites look clean  - Removal of staples from LLE stump on 1/19    # h/o esophageal candidiasis  - Completed 14 days of fluconazole course,   - outpt GI fu    # opioid dependance  - cw methadone, 60 q8 PRN and Oxycodone 30mg q8  - outpt clinic fu on discharge    # HTN (not on any medication)    # suspected folate / vit D / Vit b12 DEF  c/w cholecalceferol    # Pt looks malnourished  - Dietician eval noted  - started megace    # Sacral and buttock pressure sores  - Burn eval:  Healing sacral and buttock pressure sores--> no infection as per burn   - soap and water qd, santyl ointment and dakin solution wet to dry dressing , ABD pad dressing change qd  - no surgery needed    PLAN: psych and Hospice consult    DVT PPX: heparin  GI PPx: not indicated  Dispo: Requesting home services. ACUTE  Diet: K restricted  Activity: OOBTC.  CODE: DNR/DNI/no feeding tubes/no hemodialysis  Patient requested Hospice

## 2020-01-23 NOTE — PROGRESS NOTE BEHAVIORAL HEALTH - NSBHFUPINTERVALHXFT_PSY_A_CORE
Patient seen and interviewed , mother and aunt at bedside also with the medical resident on the case and the .   According to the medical team, patient I.V antibiotics have been discontinued and is currently is on oral medication. Also medical team reported that patient has refused hemodialysis and his wishes are being respected. Medical team report that at this time, the plan is to transfer patient to hospice however they would prefer to transfer patient to a supervised setting for hospice because of statements that have been reported that he made with regards to suicide.   In addition, a rapid response was said to have been called this yesterday night because patient became unresponsive and responded to 2 doses of narcan after some visitors were reported to have left his room.   On approach, patient was observed to be drowsy but easily arousable. He reported that he knows that he is the hospital and recognise that it was the psychiatrist talking to him . Patient was involved in the discussion to an extent but after sometime was observed to be come more drowsy and fell asleep.   Patient reports no fresh complaint and reported that he remembers that he had agreed to having a family meeting to discuss the plans and recommendations of the medical team taking care of him. Patient reiterated that he does not want hemodialysis and had refused the offer of Vancomycin for his osteomyelitis because the infectious disease doctor had told him that it will not offer any long term benefit and it would further compromise his kidneys.   Patient denies having susicdal thoughts , intent or plan. he reiterated that the incident that his mother alluded to about him overdosing on his medication was an accident.   Patient's mother and aunt report that they are not concerned that patient would intentionally overdose on his medication to end his life, however, they are concerned that since he lives alone and has access to his pain medication and has a history of misusing his pain medication, they are concerned that he would accidentally overdose on his medication and inadvertently kill himself. Patient's mother also clarified that her report about the whole family going into a garage , turning on the car and dying quietly was said when her  was dying and  in the context of her frustration with medicaid and the services that were offered to the family at that time. She reports that patient has written a letter to her in the past that he does not want to suffer and that he would prefer to overdose on medication and die at home. Patient at that time, interjected and denied writing any such letter. Patient's aunt reported that she never saw the letter when patient's mother asked her about it. Patient's mother reports that she is concerned about patient being discharged home alone for hospice care as he does not qualify for any homecare services ( she is too old to take care of him adequately)  and patient is refusing supervised hospice care.   Patient was asleeop by the time, the family meeting addressed the incident that occurred yesterday when he became unresponsive. Patient's mother reports that no one came to visit him as the last visitor left with her . She report that patient has had a similar episode of unresponsiveness as a result of hypoglycemia , blood sugar of 26. She reports that she has also looked through his possessions and that it is not likely that patient ingested anything.  As per medical resident, patient's blood sugar at the point of unresponsiveness was said to be 23 however he was not arousable despite the dextrose that was give and only became alert after the 2 doses of narcan.    and medical resident informed patient's mother that they will continue to educate patient about the benefits of this service.

## 2020-01-23 NOTE — PROGRESS NOTE BEHAVIORAL HEALTH - SUMMARY
Mr Booker is a 59 year old man with no history of a psychiatric illness who was admitted to the medical floor for the management of osteomyelitis, MRSA and septicemia. Psychiatry consult was called for the evaluation of possible suicidal ideations as patient's mother is concerned that if patient is discharged home alone, he will overdose on his narcotic pain medication to end his life. A family meeting was offered to patient, his mother and the medical team as there seemed to be a misunderstanding by the patient and his mother about the recommendations given by the medical team with the intention to foster better communication.  Patient continues to deny having suicidal ideations , intent or plan. It appears that patient's mother expresses her distress about the potential demise of patient about concern about his end of life care in a manner that seems to confounds the presentation of the patient . For now , it appears that patient, his mother and the medical team are in agreeable that patient needs hospice care. Although patient does not appear to be amenable to being transferred to a supervised setting , his mother and the medical team are agreeable that this tis the best option of care .   At this time, patient is a chronic risk for suicide however is not considered an imminent danger to himself or others and does not need inpatient psychiatric hospitalization. Patient seems to have been evaluated and accepted for hospice care, he would benefit from being in a supervised setting as he does not seem pt be able to take care of himself and has no access to home care . There is not benefit from the use of psychotropic medication to target depression at this time. he may however benefit from supportive psychotherapy at home if he is discharged home or if the supervised hospice facility provides this service.

## 2020-01-24 NOTE — PROGRESS NOTE ADULT - ASSESSMENT
59 years old male known to have PVD s/p right side AKA and left below knee amputation, paraplegia with multiple chronic bilateral buttock and sacral stage 4 ulcers, suprapubic catheter, HTN (not on any medication), CKD stage IV (not following with nephrology), hypothyroidism, opioid dependance, recent admission for cold left foot (chronic left foot dry gangrene, h/o esophageal candidiasis was sent to the rehab due to worsening kidney functions and hypoglycemia with FS 45.    In the ED, pt remained hemodynamically stable but looks very pale, no active bleeding, lab workup showed leukocytosis of 19, normo anemia with Hb 7.7 (vbg 8.7), potassium 6.5 (hemolyzed) repeat 5.2, anion gap metabolic acidosis, worsening CKD 4 with creat 4.1 (baseline 3.5), , cxr ok.    1/21/2020: Pt refused every IV antibiotics and labs and want hospice. He showed suicidal ideation to his mother, Mother told the nurse. Consulted psychiatry  1/23/2020: Pt was hypoglycemic overnight with 23 FS, D50 and narcan was pushed and D10 drip was started. Decreasing Methadone 80 q8 to Methadone 60 q8 PRN    # Septic shock - Now vitally stable  - Had Fever of 101, hypotensive to the 60s systolic, blood pressure perked up after NS bolus and PRBC. lactate 1.4  - Leukocytosis improved from 27K to 6K. No fever in last 48 hours.  - Likely the source is sacral ulcer - chronic OM  - 1 blood culture on 1/15 was positive for MRSA. 1/16 negative, repeat BCx from 1/20 are pending  - Was on NS then was on D5 with HCO3 and now refusing fluids.   - Echo: No vegetations can bee seen on TTE  - Started Doxy 100mg bid  - Pt refused every IV antibiotics and labs and want to go home    # Suicidal ideation  - He showed suicidal ideation to his mother, Mother told the nurse.   - Consulted psychiatry -> Low acute risk for suicide. An element of depression is present due to multiple comorbidities  - Recommended enhanced supervision but no psychiatric medication   - he may however benefit from supportive psychotherapy at home if he is discharged home or if the supervised hospice facility provides this service.   - no psychiatric contraindications to discharge    # CKD 4 - Creatinine stable around 4.4:  - Creatinine stable around 4.4 , no indication for RRT  - Nephro is following, will f/u OP  - Increased the dose of phosp binders today, please continue  - Pt refused hemodialysis    # Metabolic acidosis: Non anion gap metabolic acidosis, bicarb improved from 13 to 20.  - increased sodium bicarb to 1300 q 8  - s/p D5 with HCO3 - Now refusing Iv drip    # Hypoglycemia  -No evidence of adrenal insufficiency from blood work before in dec 2019 cosyntropin test negative, no need for hydrocortisone taper to 10mg for 1 week and stop it.  -Low insulin level of 2 , c peptide within normal limits in dec 2019  - Work-up last month ruled out the usual endocrine causes (cosyntropin test was normal, insulin levels were low-normal when glucose was low  - tapered HC and will discontinue 10 mg of hydrocortisone on 1/24  - DDs likley malnutrition Vs Methadone induced  - Decreasing Methadone 80 q8 to Methadone 60 q8 PRN  - As per ENDO no need of FS    # hypothyroidism  - c/w levothyroxine 100. TSH 14 (going down)  - OP f/u with endo    # Behavioral abnormalities  - Psych eval noted: patient deemed not suicidal and can make his own decisions  - He signed AMA on 1/11 but did not leave  - Seek more pain meds, already on methadone 80 q8 (Decreasing Methadone 80 q8 to Methadone 60 q8 PRN) and oxycodone IR 30 q8. No more pain meds    # Acute normo anemia  - no active bleed  - keep active type and screen  - transfuse to keep Hb > 7  - Iron studies from Nov 2019 shows anemia of chronic disease + iron def  - Started Aranesp 20 mcg q weekly from today    # PVD s/p right side AKA and left below knee amputation  - incision sites look clean  - Removal of staples from LLE stump on 1/19    # h/o esophageal candidiasis  - Completed 14 days of fluconazole course,   - outpt GI fu    # opioid dependance  - cw methadone, 60 q8 PRN and Oxycodone 30mg q8  - outpt clinic fu on discharge    # HTN (not on any medication)    # suspected folate / vit D / Vit b12 DEF  c/w cholecalceferol    # Pt looks malnourished  - Dietician eval noted  - started megace    # Sacral and buttock pressure sores  - Burn eval:  Healing sacral and buttock pressure sores--> no infection as per burn   - soap and water qd, santyl ointment and dakin solution wet to dry dressing , ABD pad dressing change qd  - no surgery needed    PLAN: Pending adeo house    DVT PPX: heparin  GI PPx: not indicated  Dispo: Requesting home services. ACUTE  Diet: K restricted  Activity: OOBTC.  CODE: DNR/DNI/no feeding tubes/no hemodialysis  Patient requested Hospice

## 2020-01-24 NOTE — PROGRESS NOTE ADULT - ASSESSMENT
59 years old male known to have PVD s/p right side bKA and left below knee amputation, paraplegia with multiple chronic bilateral buttock and sacral stage 4 ulcers, suprapubic catheter, HTN (not on any medication), CKD stage IV (not following with nephrology), hypothyroidism, opioid dependance,  h/o esophageal candidiasis was sent from rehab due to worsening kidney functions and hypoglycemia.   Pt only states he had cloudy urine for past few days. states even if needed he will not go for hemodialysis. (10 Toni 2020 21:52)    # Unresponsiveness probably  sec to methadone and Hypoglycemia-resolved  # Hypoglycemia, likely due to chronic malnutrition vs methadone (some case reports about hypoglycemia)  -HbA1c 4.8 (1/11/2020)  - Endocrine F/u :It appears that the patient's mild-moderate hypoglycemia is related to markedly reduced muscle mass and may in part be related to opiates.  Would encourage nutrition, use appetite stimulants.  - dose of methadone decreased  -instructed  to hold methadone and oxycodone , if pt is drowsy  - monitor FS  - C/w IV fluids    # Suicidal ideation  - Psychiatry F/u: patient is a chronic risk for suicide however is not considered an imminent danger to himself or others and does not need inpatient psychiatric hospitalization. Patient seems to have been evaluated and accepted for hospice care, he would benefit from being in a supervised setting as he does not seem pt be able to take care of himself and has no access to home care    # Sepsis POA  #  MRSA bactremia  - Blood culture:  Methicillin resistant Staphylococcus aureus (MRSA): Detec (01.15.20 @ 15:45)  - blood Culture Results: No growth to date. (01.16.20 @ 07:24)  - MRSA PCR Result.: Positive: By: Real-Time PCR (Polymerase Reaction Method) (12.02.19 @ 18:50)  -  Xray Chest 1 View AP/PA (01.10.20 @ 14:04) >No consolidation, effusion or pneumothorax.  - echo: no vegetation seen  - Pt does not want  blood draws and doesnot want to be on IV antibiotics.  - c/w  doxycycline 100mg  PO q12 for 4 weeks    # Sacral and buttock pressure sores  - Burn eval:  Healing sacral and buttock pressure sores--> no infection  - soap and water qd, santyl ointment and dakin solution wet to dry dressing , ABD pad dressing change qd  - no surgery needed    # Esophageal candidiasis with erosive gastritis  - EGD (12.27.19 @ 09:30) >esophagitis in the lower third of the esophagus compatible with non-erosive esophagitis. (Biopsy).  Ulcers in the upper third of the esophagus and middle third of the esophagus.(Biopsy). Erosions in the antrum compatible with erosive gastritis. (Biopsy). Erythema in the stomach body and fundus compatible with non-erosive gastritis.(Biopsy).   - Surgical Pathology Report (12.27.19 @ 12:30)  Esophageal ulcers, biopsy: Fragments of esophageal squamous mucosa, with Candida esophagitis (see Comment). GMS stain highlights the budding yeastlike fungi andpseuohyphae of Candida.  Mid esophagus, biopsy:- Fragments of esophageal squamous mucosa with mild nonspecific chronic inflammation.  - completed course with  flucanazole  - c/w protonix    # PVD with B/l BKA  - vascular surgery F/u : removed staples from LLE stump. Incision was well healed and staples were easily remov    # Hypomagnesemia  - repleted with Mg    # Decrease PO intake   - c/w megace    # CKD stage 4, acidosis  - refused dialysis  - c/w phoslo, renagel  - c/w sodium bicarb    # Hypothyroidism  - Thyroid Stimulating Hormone, Serum: 14.10 uIU/mL (01.13.20 @ 11:29)  - c/w synthroid  -please check TSH as an OP to decide on dosage adjustments    # No evidence of adrenal insufficiency  - Work-up last month ruled out the usual endocrine causes (cosyntropin test was normal, insulin levels were low-normal when glucose was low  -  hydrocortisone discontinued    # Vit D def   -c/w cholecalciferol    # Chronic opiate dependence   - follows up with outpt  pain management dr Mathew c/w his home meds    # DVT prophylaxis    # GOC discussed: Pt wants to be DNR/ DNI/ no feeding tube/no dialysis/ limited medical intervention/no blood draws, no IV antibiotics.  Hospice F/u    # Pending:  hospice F/u for placement  # Discussed with patient plan of care.   # Disposition: [facility]

## 2020-01-24 NOTE — PROGRESS NOTE ADULT - SUBJECTIVE AND OBJECTIVE BOX
Patient is a 59y old  Male who presents with a chief complaint of elevated potassium and hypoglycemia (15 Toni 2020 16:23)    Patient was seen and examined.  Pt awake, alert today.  no over night events.    PAST MEDICAL & SURGICAL HISTORY:  Anemia  PAD (peripheral artery disease)  Hypertension  Suprapubic catheter  Pressure ulcer  Diabetes  Lumbar compression fracture  S/P below knee amputation, left  S/P below knee amputation, right  S/P debridement  Toe amputation status, right  H/O hernia repair    Allergies  sulfamethizole (Unknown)    MEDICATIONS  (STANDING):  amitriptyline 25 milliGRAM(s) Oral at bedtime  calcium acetate 1334 milliGRAM(s) Oral three times a day with meals  chlorhexidine 4% Liquid 1 Application(s) Topical <User Schedule>  cholecalciferol 2000 Unit(s) Oral daily  collagenase Ointment 1 Application(s) Topical two times a day  Dakins Solution - 1/2 Strength 1 Application(s) Topical two times a day  darbepoetin Injectable ViaL 20 MICROGram(s) SubCutaneous every week  dextrose 10%. 250 milliLiter(s) (500 mL/Hr) IV Continuous <Continuous>  dextrose 5% + sodium chloride 0.45%. 1000 milliLiter(s) (75 mL/Hr) IV Continuous <Continuous>  doxycycline hyclate Capsule 100 milliGRAM(s) Oral every 12 hours  heparin  Injectable 5000 Unit(s) SubCutaneous every 12 hours  influenza   Vaccine 0.5 milliLiter(s) IntraMuscular once  levothyroxine 100 MICROGram(s) Oral daily  megestrol Suspension 400 milliGRAM(s) Oral daily  methadone    Tablet 60 milliGRAM(s) Oral every 8 hours  midodrine. 2.5 milliGRAM(s) Oral three times a day  oxybutynin 10 milliGRAM(s) Oral two times a day  polyethylene glycol 3350 17 Gram(s) Oral at bedtime  sevelamer carbonate 800 milliGRAM(s) Oral three times a day with meals  sodium bicarbonate 1300 milliGRAM(s) Oral three times a day    MEDICATIONS  (PRN):  acetaminophen   Tablet .. 650 milliGRAM(s) Oral every 6 hours PRN Temp greater or equal to 38.5C (101.3F), Mild Pain (1 - 3)  diazepam    Tablet 5 milliGRAM(s) Oral two times a day PRN anxiety  ondansetron Injectable 4 milliGRAM(s) IV Push four times a day PRN Nausea and/or Vomiting  oxyCODONE    IR 30 milliGRAM(s) Oral every 8 hours PRN Moderate Pain (4 - 6)    T(C): 36.7 (01-24-20 @ 06:29), Max: 37.1 (01-23-20 @ 14:20)  HR: 84 (01-24-20 @ 06:29) (77 - 90)  BP: 129/71 (01-24-20 @ 06:29) (119/64 - 129/71)  RR: 20 (01-24-20 @ 06:29) (18 - 20)  SpO2: 96% (01-24-20 @ 06:29) (96% - 96%)    O/E:  Awake,  not in distress.  HEENT: atraumatic, EOMI.  Chest: clear.  CVS: SIS2 +, no murmur.  P/A: Soft, BS+  CNS: non focal.  Ext: B/l BKA   Skin: b/l buttock and sacral ulcers  All systems reviewed positive findings as above.

## 2020-01-24 NOTE — PROGRESS NOTE ADULT - SUBJECTIVE AND OBJECTIVE BOX
LENGTH OF HOSPITAL STAY: 14d    CHIEF COMPLAINT:   Patient is a 59y old  Male who presents with a chief complaint of Septic shock (23 Jan 2020 08:40)      Overnight events:    No acute events overnight    ALLERGIES:  sulfamethizole (Unknown)    MEDICATIONS:  STANDING MEDICATIONS  amitriptyline 25 milliGRAM(s) Oral at bedtime  calcium acetate 1334 milliGRAM(s) Oral three times a day with meals  chlorhexidine 4% Liquid 1 Application(s) Topical <User Schedule>  cholecalciferol 2000 Unit(s) Oral daily  collagenase Ointment 1 Application(s) Topical two times a day  Dakins Solution - 1/2 Strength 1 Application(s) Topical two times a day  darbepoetin Injectable ViaL 20 MICROGram(s) SubCutaneous every week  dextrose 10%. 250 milliLiter(s) IV Continuous <Continuous>  dextrose 5% + sodium chloride 0.45%. 1000 milliLiter(s) IV Continuous <Continuous>  doxycycline hyclate Capsule 100 milliGRAM(s) Oral every 12 hours  heparin  Injectable 5000 Unit(s) SubCutaneous every 12 hours  influenza   Vaccine 0.5 milliLiter(s) IntraMuscular once  levothyroxine 100 MICROGram(s) Oral daily  megestrol Suspension 400 milliGRAM(s) Oral daily  methadone    Tablet 60 milliGRAM(s) Oral every 8 hours  midodrine. 2.5 milliGRAM(s) Oral three times a day  oxybutynin 10 milliGRAM(s) Oral two times a day  polyethylene glycol 3350 17 Gram(s) Oral at bedtime  sevelamer carbonate 800 milliGRAM(s) Oral three times a day with meals  sodium bicarbonate 1300 milliGRAM(s) Oral three times a day    PRN MEDICATIONS  acetaminophen   Tablet .. 650 milliGRAM(s) Oral every 6 hours PRN  diazepam    Tablet 5 milliGRAM(s) Oral two times a day PRN  ondansetron Injectable 4 milliGRAM(s) IV Push four times a day PRN  oxyCODONE    IR 30 milliGRAM(s) Oral every 8 hours PRN    VITALS:   T(F): 98.1  HR: 84  BP: 129/71  RR: 20  SpO2: 96%    LABS:                          Cultures:      RADIOLOGY:    PHYSICAL EXAM:  GEN: No acute distress, pale, NC/AT  LUNGS: Clear to auscultation bilaterally, no wheezes   HEART: S1/S2 present. RRR. No murmurs  ABD: Soft, non-tender, non-distended. Bowel sounds present  EXT: L BKA, R AKA. Sacral ulcers and lower back ulcer, foul smelling with surrounding erythema  NEURO: AAOX3    Intravenous access: No IV access  NG tube: None  Supra pubic Catheter: Placed (patient change by himself, no need of urology)

## 2020-01-24 NOTE — CHART NOTE - NSCHARTNOTEFT_GEN_A_CORE
Registered Dietitian Follow-Up     Patient Profile Reviewed                           Yes [x]   No []     Nutrition History Previously Obtained        Yes [x]  No []  brief hx obtained at 1/22 f/u      Pertinent Subjective Information:   -Pt laying comfortably in bed at time of assessment, family member at bedside. Pt with many snacks in room brought in by family but overall intake continues to remain poor. Pt expresses great disinterest in hospital meals since beginning of admit and at prior admits. RD attempted to obtain food preferences but pt stated "it changes everyday". Kitchen staff aware and very patient with meal ordering. RD continued to offer multiple supplement but pt continues to decline. Megace in place but only x2 days so no impact on appetite at this time. As per LIP, pt requesting hospice services at this time but complications with d/c arrangements. Not yet hospice care vs CMO but supportive care at this time. RD will continue to f/u POC.      Pertinent Medical Interventions:   1. Septic shock, now vitally stable   --pt refusing every IV abx and labs, wants to go home   2. Suicidal Ideation, psych following   3. CKD IV--pt refusing HD   4. Metabolic acidosis: Non anion gap metabolic acidosis-bicarb improved  5. Hypoglycemia, possibly malnutrition vs medication induced   6. Acute normocytic anemia, no active bleed   Dispo: DNR/DNI/no feeding tubes/no hemodialysis. Hospice f/u     Diet order: Renal Replacement - no protein restriction.     Anthropometrics:  - Ht. 185.42 cm  - Wt. 60.1kg (1/11); no new wt at this time  - BMI 19.6 - adjusted for b/l BKAs   - IBW 73.9 kg (adjusted)     Pertinent Lab Data: no new labs since 1/21      Pertinent Meds: heparin, abx, d5 + NS @75mL (~306kcal), megace, methadone, oxy, zofran, phoslo, miralax, sodium bicarbonate, cholecalciferol, renvela, synthroid     Physical Findings:  - Appearance: Alert & oriented  - GI function: ?constipation vs poor EMR documentation as LBM 1/20 and pt unsure   - Tubes: no feeding tubes  - Oral/Mouth cavity: no chewing/swallowing difficulty reported  - Skin: B/L BKAs. Stage IV pressure ulcer to sacrum and R buttocks. stage II pressure ulcer to R IT; no edema noted      Nutrition Requirements  Weight Used: 60.1kg ABW (continued from RD initial assessment 1/12)     Estimated calorie needs: 4117-9574 kcals/day (30-35 kcals/kg for multiple PU)  Estimated protein needs: 54-66 g/day (0.9 - 1.1 gm/kg for PU and CKD4 not on HD)  Estimated fluid needs: 1ml/kcal or per LIP      Nutrient Intake: Continues to consume <50% meals as pt very particular and often refusing meal options      [x] Previous Nutrition Diagnosis: increased nutrient needs             [x] Ongoing          [] Resolved     Nutrition Intervention: Meals and Snacks, Vitamin & Mineral Supplements  Recommend:  1. If pt to decide on CMO and no further lab draws, consider liberalizing diet order to Regular to optimize overall intake.   2. Continue Renal diet at this time until GOC decision made.   3. Consider marinol as opposed to megace if no improvement in appetite noted.      Goal/Expected Outcome: Pt to achieve & maintain at least 50% po intake over next 4days or final GOC decision made.     Indicator/Monitoring: Energy intake, glucose profile, renal profile, nutrition focused physical findings, body composition, micronutrient intake.

## 2020-01-25 NOTE — PROGRESS NOTE ADULT - ASSESSMENT
59 years old male known to have PVD s/p right side AKA and left below knee amputation, paraplegia with multiple chronic bilateral buttock and sacral stage 4 ulcers, suprapubic catheter, HTN (not on any medication), CKD stage IV (not following with nephrology), hypothyroidism, opioid dependance, recent admission for cold left foot (chronic left foot dry gangrene, h/o esophageal candidiasis was sent to the rehab due to worsening kidney functions and hypoglycemia with FS 45.    In the ED, pt remained hemodynamically stable but looks very pale, no active bleeding, lab workup showed leukocytosis of 19, normo anemia with Hb 7.7 (vbg 8.7), potassium 6.5 (hemolyzed) repeat 5.2, anion gap metabolic acidosis, worsening CKD 4 with creat 4.1 (baseline 3.5), , cxr ok.    1/21/2020: Pt refused every IV antibiotics and labs and want hospice. He showed suicidal ideation to his mother, Mother told the nurse. Consulted psychiatry  1/23/2020: Pt was hypoglycemic overnight with 23 FS, D50 and narcan was pushed and D10 drip was started. Decreasing Methadone 80 q8 to Methadone 60 q8 PRN    # Septic shock - Now vitally stable  - Had Fever of 101, hypotensive to the 60s systolic, blood pressure perked up after NS bolus and PRBC. lactate 1.4  - Leukocytosis improved from 27K to 6K. No fever in last 48 hours.  - Likely the source is sacral ulcer - chronic OM  - 1 blood culture on 1/15 was positive for MRSA. 1/16 negative, repeat BCx from 1/20 are pending  - Was on NS then was on D5 with HCO3 and now refusing fluids.   - Echo: No vegetations can bee seen on TTE  - Started Doxy 100mg bid  - Pt refused every IV antibiotics and labs and agreed for [facility]    # Suicidal ideation  - He showed suicidal ideation to his mother, Mother told the nurse.   - Consulted psychiatry -> Low acute risk for suicide. An element of depression is present due to multiple comorbidities  - Recommended enhanced supervision but no psychiatric medication   - he may however benefit from supportive psychotherapy at home if he is discharged home or if the supervised hospice facility provides this service.   - no psychiatric contraindications to discharge    # CKD 4 - Creatinine stable around 4.4:  - Creatinine stable around 4.4 , no indication for RRT  - Nephro is following, will f/u OP  - Increased the dose of phosp binders today, please continue  - Pt refused hemodialysis    # Metabolic acidosis: Non anion gap metabolic acidosis, bicarb improved from 13 to 20.  - increased sodium bicarb to 1300 q 8  - s/p D5 with HCO3 - Now refusing Iv drip    # Hypoglycemia  -No evidence of adrenal insufficiency from blood work before in dec 2019 cosyntropin test negative, no need for hydrocortisone taper to 10mg for 1 week and stop it.  -Low insulin level of 2 , c peptide within normal limits in dec 2019  - Work-up last month ruled out the usual endocrine causes (cosyntropin test was normal, insulin levels were low-normal when glucose was low  - d/c hydrocortisone on 1/24  - DDs likley malnutrition Vs Methadone induced  - Decreasing Methadone 80 q8 to Methadone 60 q8 PRN  - As per ENDO no need of FS    # hypothyroidism  - c/w levothyroxine 100. TSH 14 (going down)  - OP f/u with endo    # Behavioral abnormalities  - Psych eval noted: patient deemed not suicidal and can make his own decisions  - He signed AMA on 1/11 but did not leave  - Seek more pain meds, already on methadone 80 q8 (Decreasing Methadone 80 q8 to Methadone 60 q8 PRN) and oxycodone IR 30 q8. No more pain meds    # Acute normo anemia  - no active bleed  - keep active type and screen  - transfuse to keep Hb > 7  - Iron studies from Nov 2019 shows anemia of chronic disease + iron def  - Started Aranesp 20 mcg q weekly from today    # PVD s/p right side AKA and left below knee amputation  - incision sites look clean  - Removal of staples from LLE stump on 1/19    # h/o esophageal candidiasis  - Completed 14 days of fluconazole course,   - outpt GI [facility]    # opioid dependance  - cw methadone, 60 q8 PRN and Oxycodone 30mg q8  - outpt clinic [facility] on discharge    # HTN (not on any medication)    # suspected folate / vit D / Vit b12 DEF  c/w cholecalceferol    # Pt looks malnourished  - Dietician eval noted  - started megace    # Sacral and buttock pressure sores  - Burn eval:  Healing sacral and buttock pressure sores--> no infection as per burn   - soap and water qd, santyl ointment and dakin solution wet to dry dressing , ABD pad dressing change qd  - no surgery needed    PLAN: Pending [facility], and agreed for [facility]    DVT PPX: heparin  GI PPx: not indicated  Dispo: Requesting home services. ACUTE  Diet: K restricted  Activity: OOBTC.  CODE: DNR/DNI/no feeding tubes/no hemodialysis  [facility]

## 2020-01-25 NOTE — PROGRESS NOTE ADULT - SUBJECTIVE AND OBJECTIVE BOX
LENGTH OF HOSPITAL STAY: 15d    CHIEF COMPLAINT:   Patient is a 59y old  Male who presents with a chief complaint of Septic Shock (24 Jan 2020 11:34)      Overnight events:    No acute events overnight    ALLERGIES:  sulfamethizole (Unknown)    MEDICATIONS:  STANDING MEDICATIONS  amitriptyline 25 milliGRAM(s) Oral at bedtime  calcium acetate 1334 milliGRAM(s) Oral three times a day with meals  chlorhexidine 4% Liquid 1 Application(s) Topical <User Schedule>  cholecalciferol 2000 Unit(s) Oral daily  collagenase Ointment 1 Application(s) Topical two times a day  Dakins Solution - 1/2 Strength 1 Application(s) Topical two times a day  darbepoetin Injectable ViaL 20 MICROGram(s) SubCutaneous every week  dextrose 10%. 250 milliLiter(s) IV Continuous <Continuous>  dextrose 5% + sodium chloride 0.45%. 1000 milliLiter(s) IV Continuous <Continuous>  doxycycline hyclate Capsule 100 milliGRAM(s) Oral every 12 hours  dronabinol 2.5 milliGRAM(s) Oral every 12 hours  heparin  Injectable 5000 Unit(s) SubCutaneous every 12 hours  influenza   Vaccine 0.5 milliLiter(s) IntraMuscular once  levothyroxine 100 MICROGram(s) Oral daily  methadone    Tablet 60 milliGRAM(s) Oral every 8 hours  midodrine. 2.5 milliGRAM(s) Oral three times a day  nystatin Ointment 1 Application(s) Topical two times a day  oxybutynin 10 milliGRAM(s) Oral two times a day  polyethylene glycol 3350 17 Gram(s) Oral at bedtime  sevelamer carbonate 800 milliGRAM(s) Oral three times a day with meals  sodium bicarbonate 1300 milliGRAM(s) Oral three times a day  triamcinolone 0.1% Ointment 1 Application(s) Topical every 12 hours    PRN MEDICATIONS  acetaminophen   Tablet .. 650 milliGRAM(s) Oral every 6 hours PRN  diazepam    Tablet 5 milliGRAM(s) Oral two times a day PRN  ondansetron Injectable 4 milliGRAM(s) IV Push four times a day PRN  oxyCODONE    IR 30 milliGRAM(s) Oral every 8 hours PRN    VITALS:   T(F): 98.6  HR: 92  BP: 127/74  RR: 20  SpO2: --    LABS:                          Cultures:      RADIOLOGY:    PHYSICAL EXAM:  GEN: No acute distress  HEENT:   LUNGS: Clear to auscultation bilaterally   HEART: S1/S2 present. RRR.   ABD: Soft, non-tender, non-distended. Bowel sounds present  EXT:  NEURO: AAOX3

## 2020-01-25 NOTE — PROGRESS NOTE ADULT - ASSESSMENT
59 years old male known to have PVD s/p right side bKA and left below knee amputation, paraplegia with multiple chronic bilateral buttock and sacral stage 4 ulcers, suprapubic catheter, HTN (not on any medication), CKD stage IV (not following with nephrology), hypothyroidism, opioid dependance,  h/o esophageal candidiasis was sent from rehab due to worsening kidney functions and hypoglycemia.   Pt only states he had cloudy urine for past few days. states even if needed he will not go for hemodialysis. (10 Toni 2020 21:52)    # Unresponsiveness probably  sec to methadone and Hypoglycemia-resolved  # Hypoglycemia, likely due to chronic malnutrition vs methadone (some case reports about hypoglycemia)  -HbA1c 4.8 (1/11/2020)  - Endocrine F/u :It appears that the patient's mild-moderate hypoglycemia is related to markedly reduced muscle mass and may in part be related to opiates.  Would encourage nutrition, use appetite stimulants.  - dose of methadone decreased  -instructed  to hold methadone and oxycodone , if pt is drowsy  - monitor FS  - C/w IV fluids    # Suicidal ideation  - Psychiatry F/u: patient is a chronic risk for suicide however is not considered an imminent danger to himself or others and does not need inpatient psychiatric hospitalization. Patient seems to have been evaluated and accepted for hospice care, he would benefit from being in a supervised setting as he does not seem pt be able to take care of himself and has no access to home care    # Sepsis POA  #  MRSA bactremia  - Blood culture:  Methicillin resistant Staphylococcus aureus (MRSA): Detec (01.15.20 @ 15:45)  - blood Culture Results: No growth to date. (01.16.20 @ 07:24)  - MRSA PCR Result.: Positive: By: Real-Time PCR (Polymerase Reaction Method) (12.02.19 @ 18:50)  -  Xray Chest 1 View AP/PA (01.10.20 @ 14:04) >No consolidation, effusion or pneumothorax.  - echo: no vegetation seen  - Pt does not want  blood draws and doesnot want to be on IV antibiotics.  - c/w  doxycycline 100mg  PO q12 for 4 weeks    # Sacral and buttock pressure sores  - Burn eval:  Healing sacral and buttock pressure sores--> no infection  - soap and water qd, santyl ointment and dakin solution wet to dry dressing , ABD pad dressing change qd  - no surgery needed    # Esophageal candidiasis with erosive gastritis  - EGD (12.27.19 @ 09:30) >esophagitis in the lower third of the esophagus compatible with non-erosive esophagitis. (Biopsy).  Ulcers in the upper third of the esophagus and middle third of the esophagus.(Biopsy). Erosions in the antrum compatible with erosive gastritis. (Biopsy). Erythema in the stomach body and fundus compatible with non-erosive gastritis.(Biopsy).   - Surgical Pathology Report (12.27.19 @ 12:30)  Esophageal ulcers, biopsy: Fragments of esophageal squamous mucosa, with Candida esophagitis (see Comment). GMS stain highlights the budding yeastlike fungi andpseuohyphae of Candida.  Mid esophagus, biopsy:- Fragments of esophageal squamous mucosa with mild nonspecific chronic inflammation.  - completed course with  flucanazole  - c/w protonix    # PVD with B/l BKA  - vascular surgery F/u : removed staples from LLE stump. Incision was well healed and staples were easily remov    # Decrease PO intake   - discontinue megace  - start marinol    # CKD stage 4, acidosis  - refused dialysis  - c/w phoslo, renagel  - c/w sodium bicarb    # Hypothyroidism  - Thyroid Stimulating Hormone, Serum: 14.10 uIU/mL (01.13.20 @ 11:29)  - c/w synthroid  -please check TSH as an OP to decide on dosage adjustments    # No evidence of adrenal insufficiency  - Work-up last month ruled out the usual endocrine causes (cosyntropin test was normal, insulin levels were low-normal when glucose was low  -  hydrocortisone discontinued    # Vit D def   -c/w cholecalciferol    # Chronic opiate dependence   - follows up with outpt  pain management dr Mathew c/w his home meds    # DVT prophylaxis    # GOC discussed: Pt wants to be DNR/ DNI/ no feeding tube/no dialysis/ limited medical intervention/no blood draws, no IV antibiotics.  Hospice F/u    # Pending:  awaiting placement   # Discussed with patient plan of care.   # Disposition: [facility]

## 2020-01-25 NOTE — PROGRESS NOTE ADULT - SUBJECTIVE AND OBJECTIVE BOX
Patient is a 59y old  Male who presents with a chief complaint of elevated potassium and hypoglycemia (15 Toni 2020 16:23)    Patient was seen and examined.  Pt awake, alert   no over night events.    PAST MEDICAL & SURGICAL HISTORY:  Anemia  PAD (peripheral artery disease)  Hypertension  Suprapubic catheter  Pressure ulcer  Diabetes  Lumbar compression fracture  S/P below knee amputation, left  S/P below knee amputation, right  S/P debridement  Toe amputation status, right  H/O hernia repair    Allergies  sulfamethizole (Unknown)    MEDICATIONS  (STANDING):  amitriptyline 25 milliGRAM(s) Oral at bedtime  calcium acetate 1334 milliGRAM(s) Oral three times a day with meals  chlorhexidine 4% Liquid 1 Application(s) Topical <User Schedule>  cholecalciferol 2000 Unit(s) Oral daily  collagenase Ointment 1 Application(s) Topical two times a day  Dakins Solution - 1/2 Strength 1 Application(s) Topical two times a day  darbepoetin Injectable ViaL 20 MICROGram(s) SubCutaneous every week  dextrose 10%. 250 milliLiter(s) (500 mL/Hr) IV Continuous <Continuous>  dextrose 5% + sodium chloride 0.45%. 1000 milliLiter(s) (75 mL/Hr) IV Continuous <Continuous>  doxycycline hyclate Capsule 100 milliGRAM(s) Oral every 12 hours  dronabinol 2.5 milliGRAM(s) Oral every 12 hours  heparin  Injectable 5000 Unit(s) SubCutaneous every 12 hours  influenza   Vaccine 0.5 milliLiter(s) IntraMuscular once  levothyroxine 100 MICROGram(s) Oral daily  methadone    Tablet 60 milliGRAM(s) Oral every 8 hours  midodrine. 2.5 milliGRAM(s) Oral three times a day  nystatin Ointment 1 Application(s) Topical two times a day  oxybutynin 10 milliGRAM(s) Oral two times a day  polyethylene glycol 3350 17 Gram(s) Oral at bedtime  sevelamer carbonate 800 milliGRAM(s) Oral three times a day with meals  sodium bicarbonate 1300 milliGRAM(s) Oral three times a day  triamcinolone 0.1% Ointment 1 Application(s) Topical every 12 hours    MEDICATIONS  (PRN):  acetaminophen   Tablet .. 650 milliGRAM(s) Oral every 6 hours PRN Temp greater or equal to 38.5C (101.3F), Mild Pain (1 - 3)  diazepam    Tablet 5 milliGRAM(s) Oral two times a day PRN anxiety  ondansetron Injectable 4 milliGRAM(s) IV Push four times a day PRN Nausea and/or Vomiting  oxyCODONE    IR 30 milliGRAM(s) Oral every 8 hours PRN Moderate Pain (4 - 6)    T(C): 37 (01-25-20 @ 06:15), Max: 37 (01-25-20 @ 06:15)  HR: 92 (01-25-20 @ 06:15) (89 - 92)  BP: 127/74 (01-25-20 @ 06:15) (119/73 - 127/74)  RR: 20 (01-25-20 @ 06:15) (18 - 20)  SpO2: --    O/E:  Awake,  not in distress.  HEENT: atraumatic, EOMI.  Chest: clear.  CVS: SIS2 +, no murmur.  P/A: Soft, BS+  CNS: non focal.  Ext: B/l BKA   Skin: b/l buttock and sacral ulcers  All systems reviewed positive findings as above.

## 2020-01-26 NOTE — PROGRESS NOTE ADULT - SUBJECTIVE AND OBJECTIVE BOX
Patient is a 59y old  Male who presents with a chief complaint of elevated potassium and hypoglycemia (15 Toni 2020 16:23)    Patient was seen and examined.  Pt states he has no appetite, was started on marinol yesterday.  He was Hypoglycemic---> D50 IV x 1 pushed, restarted on IV fluids  Pt awake, alert .  no over night events.    PAST MEDICAL & SURGICAL HISTORY:  Anemia  PAD (peripheral artery disease)  Hypertension  Suprapubic catheter  Pressure ulcer  Diabetes  Lumbar compression fracture  S/P below knee amputation, left  S/P below knee amputation, right  S/P debridement  Toe amputation status, right  H/O hernia repair    Allergies  sulfamethizole (Unknown)    MEDICATIONS  (STANDING):  amitriptyline 25 milliGRAM(s) Oral at bedtime  calcium acetate 1334 milliGRAM(s) Oral three times a day with meals  chlorhexidine 4% Liquid 1 Application(s) Topical <User Schedule>  cholecalciferol 2000 Unit(s) Oral daily  collagenase Ointment 1 Application(s) Topical two times a day  Dakins Solution - 1/2 Strength 1 Application(s) Topical two times a day  darbepoetin Injectable ViaL 20 MICROGram(s) SubCutaneous every week  dextrose 10%. 250 milliLiter(s) (500 mL/Hr) IV Continuous <Continuous>  dextrose 5% + sodium chloride 0.45%. 1000 milliLiter(s) (75 mL/Hr) IV Continuous <Continuous>  dextrose 5% + sodium chloride 0.45%. 1000 milliLiter(s) (75 mL/Hr) IV Continuous <Continuous>  dextrose 50% Injectable 25 milliLiter(s) IV Push once  doxycycline hyclate Capsule 100 milliGRAM(s) Oral every 12 hours  dronabinol 2.5 milliGRAM(s) Oral every 12 hours  heparin  Injectable 5000 Unit(s) SubCutaneous every 12 hours  influenza   Vaccine 0.5 milliLiter(s) IntraMuscular once  levothyroxine 100 MICROGram(s) Oral daily  methadone    Tablet 60 milliGRAM(s) Oral every 8 hours  midodrine. 2.5 milliGRAM(s) Oral three times a day  nystatin Ointment 1 Application(s) Topical two times a day  oxybutynin 10 milliGRAM(s) Oral two times a day  polyethylene glycol 3350 17 Gram(s) Oral at bedtime  sevelamer carbonate 800 milliGRAM(s) Oral three times a day with meals  sodium bicarbonate 1300 milliGRAM(s) Oral three times a day  triamcinolone 0.1% Ointment 1 Application(s) Topical every 12 hours    MEDICATIONS  (PRN):  acetaminophen   Tablet .. 650 milliGRAM(s) Oral every 6 hours PRN Temp greater or equal to 38.5C (101.3F), Mild Pain (1 - 3)  ondansetron Injectable 4 milliGRAM(s) IV Push four times a day PRN Nausea and/or Vomiting  oxyCODONE    IR 30 milliGRAM(s) Oral every 8 hours PRN Moderate Pain (4 - 6)    T(C): 37.4 (01-26-20 @ 06:37), Max: 37.6 (01-25-20 @ 21:54)  HR: 85 (01-26-20 @ 06:37) (81 - 85)  BP: 110/60 (01-26-20 @ 06:37) (110/60 - 122/68)  RR: 20 (01-26-20 @ 06:37) (20 - 20)  SpO2: --    O/E:  Awake,  not in distress.  HEENT: atraumatic, EOMI.  Chest: clear.  CVS: SIS2 +, no murmur.  P/A: Soft, BS+  CNS: non focal.  Ext: B/l BKA   Skin: b/l buttock and sacral ulcers  All systems reviewed positive findings as above.

## 2020-01-26 NOTE — PROGRESS NOTE ADULT - ASSESSMENT
59 years old male known to have PVD s/p right side bKA and left below knee amputation, paraplegia with multiple chronic bilateral buttock and sacral stage 4 ulcers, suprapubic catheter, HTN (not on any medication), CKD stage IV (not following with nephrology), hypothyroidism, opioid dependance,  h/o esophageal candidiasis was sent from rehab due to worsening kidney functions and hypoglycemia.   Pt only states he had cloudy urine for past few days. states even if needed he will not go for hemodialysis. (10 Toni 2020 21:52)    # Unresponsiveness probably  sec to methadone and Hypoglycemia-resolved  # Hypoglycemia, likely due to chronic malnutrition vs methadone (some case reports about hypoglycemia)  -HbA1c 4.8 (1/11/2020)  - Endocrine F/u :It appears that the patient's mild-moderate hypoglycemia is related to markedly reduced muscle mass and may in part be related to opiates.  Would encourage nutrition, use appetite stimulants.  - dose of methadone decreased  -instructed  to hold methadone and oxycodone , if pt is drowsy  - monitor FS  -  Hypoglycemic today ---> D50 IV x 1 pushed, restarted on IV fluids    # Suicidal ideation  - Psychiatry F/u: patient is a chronic risk for suicide however is not considered an imminent danger to himself or others and does not need inpatient psychiatric hospitalization. Patient seems to have been evaluated and accepted for hospice care, he would benefit from being in a supervised setting as he does not seem pt be able to take care of himself and has no access to home care    # Sepsis POA  #  MRSA bactremia  - Blood culture:  Methicillin resistant Staphylococcus aureus (MRSA): Detec (01.15.20 @ 15:45)  - blood Culture Results: No growth to date. (01.16.20 @ 07:24)  - MRSA PCR Result.: Positive: By: Real-Time PCR (Polymerase Reaction Method) (12.02.19 @ 18:50)  -  Xray Chest 1 View AP/PA (01.10.20 @ 14:04) >No consolidation, effusion or pneumothorax.  - echo: no vegetation seen  - Pt does not want  blood draws and doesnot want to be on IV antibiotics.  - c/w  doxycycline 100mg  PO q12 for 4 weeks    # Sacral and buttock pressure sores  - Burn eval:  Healing sacral and buttock pressure sores--> no infection  - soap and water qd, santyl ointment and dakin solution wet to dry dressing , ABD pad dressing change qd  - no surgery needed    # Esophageal candidiasis with erosive gastritis  - EGD (12.27.19 @ 09:30) >esophagitis in the lower third of the esophagus compatible with non-erosive esophagitis. (Biopsy).  Ulcers in the upper third of the esophagus and middle third of the esophagus.(Biopsy). Erosions in the antrum compatible with erosive gastritis. (Biopsy). Erythema in the stomach body and fundus compatible with non-erosive gastritis.(Biopsy).   - Surgical Pathology Report (12.27.19 @ 12:30)  Esophageal ulcers, biopsy: Fragments of esophageal squamous mucosa, with Candida esophagitis (see Comment). GMS stain highlights the budding yeastlike fungi andpseuohyphae of Candida.  Mid esophagus, biopsy:- Fragments of esophageal squamous mucosa with mild nonspecific chronic inflammation.  - completed course with  flucanazole  - c/w protonix    # PVD with B/l BKA  - vascular surgery F/u : removed staples from LLE stump. Incision was well healed and staples were easily remov    # Decrease PO intake   - c/w marinol    # CKD stage 4, acidosis  - refused dialysis  - c/w phoslo, renagel  - c/w sodium bicarb    # Hypothyroidism  - Thyroid Stimulating Hormone, Serum: 14.10 uIU/mL (01.13.20 @ 11:29)  - c/w synthroid  -please check TSH as an OP to decide on dosage adjustments    # No evidence of adrenal insufficiency  - Work-up last month ruled out the usual endocrine causes (cosyntropin test was normal, insulin levels were low-normal when glucose was low  -  hydrocortisone discontinued    # Vit D def   -c/w cholecalciferol    # Chronic opiate dependence   - follows up with outpt  pain management dr Mathew c/w his home meds    # DVT prophylaxis    # GOC discussed: Pt wants to be DNR/ DNI/ no feeding tube/no dialysis/ limited medical intervention/no blood draws, no IV antibiotics.  Hospice F/u    # Pending:  hospice F/u for placement  # Discussed with patient and his mother plan of care.   # Disposition: [facility]

## 2020-01-27 NOTE — PROGRESS NOTE ADULT - SUBJECTIVE AND OBJECTIVE BOX
Patient is a 59y old  Male who presents with a chief complaint of elevated potassium and hypoglycemia (15 Toni 2020 16:23)    Patient was seen and examined.  Pt had 2 episodes of vomitting overnight.  Pt awake, alert  today    PAST MEDICAL & SURGICAL HISTORY:  Anemia  PAD (peripheral artery disease)  Hypertension  Suprapubic catheter  Pressure ulcer  Diabetes  Lumbar compression fracture  S/P below knee amputation, left  S/P below knee amputation, right  S/P debridement  Toe amputation status, right  H/O hernia repair    Allergies  sulfamethizole (Unknown)    MEDICATIONS  (STANDING):  amitriptyline 25 milliGRAM(s) Oral at bedtime  calcium acetate 1334 milliGRAM(s) Oral three times a day with meals  chlorhexidine 4% Liquid 1 Application(s) Topical <User Schedule>  cholecalciferol 2000 Unit(s) Oral daily  collagenase Ointment 1 Application(s) Topical two times a day  Dakins Solution - 1/2 Strength 1 Application(s) Topical two times a day  darbepoetin Injectable ViaL 20 MICROGram(s) SubCutaneous every week  dextrose 10%. 250 milliLiter(s) (500 mL/Hr) IV Continuous <Continuous>  dextrose 5% + sodium chloride 0.45%. 1000 milliLiter(s) (75 mL/Hr) IV Continuous <Continuous>  dextrose 5% + sodium chloride 0.45%. 1000 milliLiter(s) (75 mL/Hr) IV Continuous <Continuous>  doxycycline hyclate Capsule 100 milliGRAM(s) Oral every 12 hours  dronabinol 2.5 milliGRAM(s) Oral every 12 hours  heparin  Injectable 5000 Unit(s) SubCutaneous every 12 hours  influenza   Vaccine 0.5 milliLiter(s) IntraMuscular once  levothyroxine 100 MICROGram(s) Oral daily  midodrine. 2.5 milliGRAM(s) Oral three times a day  nystatin Ointment 1 Application(s) Topical two times a day  oxybutynin 10 milliGRAM(s) Oral two times a day  oxyCODONE    IR 30 milliGRAM(s) Oral every 4 hours  polyethylene glycol 3350 17 Gram(s) Oral at bedtime  sevelamer carbonate 800 milliGRAM(s) Oral three times a day with meals  sodium bicarbonate 1300 milliGRAM(s) Oral three times a day  triamcinolone 0.1% Ointment 1 Application(s) Topical every 12 hours    MEDICATIONS  (PRN):  acetaminophen   Tablet .. 650 milliGRAM(s) Oral every 6 hours PRN Temp greater or equal to 38.5C (101.3F), Mild Pain (1 - 3)  ondansetron Injectable 4 milliGRAM(s) IV Push four times a day PRN Nausea and/or Vomiting    T(C): 37.4 (01-27-20 @ 06:44), Max: 37.4 (01-26-20 @ 12:50)  HR: 84 (01-27-20 @ 06:44) (78 - 86)  BP: 106/65 (01-27-20 @ 06:44) (98/56 - 119/75)  RR: 20 (01-27-20 @ 06:44) (20 - 20)  SpO2: --    O/E:  Awake,  not in distress.  HEENT: atraumatic, EOMI.  Chest: clear.  CVS: SIS2 +, no murmur.  P/A: Soft, BS+  CNS: non focal.  Ext: B/l BKA   Skin: b/l buttock and sacral ulcers  All systems reviewed positive findings as above.

## 2020-01-27 NOTE — PROGRESS NOTE ADULT - ASSESSMENT
59 years old male known to have PVD s/p right side bKA and left below knee amputation, paraplegia with multiple chronic bilateral buttock and sacral stage 4 ulcers, suprapubic catheter, HTN (not on any medication), CKD stage IV (not following with nephrology), hypothyroidism, opioid dependance,  h/o esophageal candidiasis was sent from rehab due to worsening kidney functions and hypoglycemia.   Pt only states he had cloudy urine for past few days. states even if needed he will not go for hemodialysis. (10 Toni 2020 21:52)    # Unresponsiveness probably  sec to methadone and Hypoglycemia-resolved  # Hypoglycemia, likely due to chronic malnutrition vs methadone (some case reports about hypoglycemia)  -HbA1c 4.8 (1/11/2020)  - Endocrine F/u :It appears that the patient's mild-moderate hypoglycemia is related to markedly reduced muscle mass and may in part be related to opiates.  Would encourage nutrition, use appetite stimulants.  - monitor FS  - c/w IV fluids  - discontinue methadone    # Suicidal ideation  - Psychiatry F/u: patient is a chronic risk for suicide however is not considered an imminent danger to himself or others and does not need inpatient psychiatric hospitalization. Patient seems to have been evaluated and accepted for hospice care, he would benefit from being in a supervised setting as he does not seem pt be able to take care of himself and has no access to home care    # Sepsis POA  #  MRSA bactremia  - Blood culture:  Methicillin resistant Staphylococcus aureus (MRSA): Detec (01.15.20 @ 15:45)  - blood Culture Results: No growth to date. (01.16.20 @ 07:24)  - MRSA PCR Result.: Positive: By: Real-Time PCR (Polymerase Reaction Method) (12.02.19 @ 18:50)  -  Xray Chest 1 View AP/PA (01.10.20 @ 14:04) >No consolidation, effusion or pneumothorax.  - echo: no vegetation seen  - Pt does not want  blood draws and doesnot want to be on IV antibiotics.  - c/w  doxycycline 100mg  PO q12 for 4 weeks    # Sacral and buttock pressure sores  - Burn eval:  Healing sacral and buttock pressure sores--> no infection  - soap and water qd, santyl ointment and dakin solution wet to dry dressing , ABD pad dressing change qd  - no surgery needed    # Esophageal candidiasis with erosive gastritis  - EGD (12.27.19 @ 09:30) >esophagitis in the lower third of the esophagus compatible with non-erosive esophagitis. (Biopsy).  Ulcers in the upper third of the esophagus and middle third of the esophagus.(Biopsy). Erosions in the antrum compatible with erosive gastritis. (Biopsy). Erythema in the stomach body and fundus compatible with non-erosive gastritis.(Biopsy).   - Surgical Pathology Report (12.27.19 @ 12:30)  Esophageal ulcers, biopsy: Fragments of esophageal squamous mucosa, with Candida esophagitis (see Comment). GMS stain highlights the budding yeastlike fungi andpseuohyphae of Candida.  Mid esophagus, biopsy:- Fragments of esophageal squamous mucosa with mild nonspecific chronic inflammation.  - completed course with  flucanazole  - c/w protonix    # PVD with B/l BKA  - vascular surgery F/u : removed staples from LLE stump. Incision was well healed and staples were easily remov    # Decrease PO intake/ gastritis  - c/w marinol  - start  protonix   - zofran prn    # CKD stage 4, acidosis  - refused dialysis  - c/w phoslo, renagel  - c/w sodium bicarb    # Hypothyroidism  - Thyroid Stimulating Hormone, Serum: 14.10 uIU/mL (01.13.20 @ 11:29)  - c/w synthroid  -please check TSH as an OP to decide on dosage adjustments    # No evidence of adrenal insufficiency  - Work-up last month ruled out the usual endocrine causes (cosyntropin test was normal, insulin levels were low-normal when glucose was low  -  hydrocortisone discontinued    # Vit D def   -c/w cholecalciferol    # Chronic opiate dependence   - follows up with outpt  pain management dr  - discontinue methadone, increase oxycodone 30 mg q4    # DVT prophylaxis    # GOC discussed: Pt wants to be DNR/ DNI/ no feeding tube/no dialysis/ limited medical intervention/no blood draws, no IV antibiotics.  Hospice F/u    # Pending:  hospice F/u for placement  # Discussed with patient and his mother plan of care.   # Disposition: [facility]

## 2020-01-27 NOTE — PROGRESS NOTE ADULT - ASSESSMENT
59 years old male known to have PVD s/p right side AKA and left below knee amputation, paraplegia with multiple chronic bilateral buttock and sacral stage 4 ulcers, suprapubic catheter, HTN (not on any medication), CKD stage IV (not following with nephrology), hypothyroidism, opioid dependance, recent admission for cold left foot (chronic left foot dry gangrene, h/o esophageal candidiasis was sent to the rehab due to worsening kidney functions and hypoglycemia with FS 45.    In the ED, pt remained hemodynamically stable but looks very pale, no active bleeding, lab workup showed leukocytosis of 19, normo anemia with Hb 7.7 (vbg 8.7), potassium 6.5 (hemolyzed) repeat 5.2, anion gap metabolic acidosis, worsening CKD 4 with creat 4.1 (baseline 3.5), , cxr ok.    1/21/2020: Pt refused every IV antibiotics and labs and want hospice. He showed suicidal ideation to his mother, Mother told the nurse. Consulted psychiatry  1/23/2020: Pt was hypoglycemic overnight with 23 FS, D50 and narcan was pushed and D10 drip was started. Decreasing Methadone 80 q8 to Methadone 60 q8 PRN    # Septic shock - Now vitally stable  - Had Fever of 101, hypotensive to the 60s systolic, blood pressure perked up after NS bolus and PRBC. lactate 1.4  - Leukocytosis improved from 27K to 6K. No fever in last 48 hours.  - Likely the source is sacral ulcer - chronic OM  - 1 blood culture on 1/15 was positive for MRSA. 1/16 negative, repeat BCx from 1/20 are pending  - Was on NS then was on D5 with HCO3 and now refusing fluids.   - Echo: No vegetations can bee seen on TTE  - Started Doxy 100mg bid  - Pt refused every IV antibiotics and labs and want to go home    # Suicidal ideation  - He showed suicidal ideation to his mother, Mother told the nurse.   - Consulted psychiatry -> Low acute risk for suicide. An element of depression is present due to multiple comorbidities  - Recommended enhanced supervision but no psychiatric medication   - he may however benefit from supportive psychotherapy at home if he is discharged home or if the supervised hospice facility provides this service.   - no psychiatric contraindications to discharge    # CKD 4 - Creatinine stable around 4.4:  - Creatinine stable around 4.4 , no indication for RRT  - Nephro is following, will f/u OP  - Increased the dose of phosp binders today, please continue  - Pt refused hemodialysis    # Metabolic acidosis: Non anion gap metabolic acidosis, bicarb improved from 13 to 20.  - increased sodium bicarb to 1300 q 8  - s/p D5 with HCO3 - Now refusing Iv drip    # Hypoglycemia  -No evidence of adrenal insufficiency from blood work before in dec 2019 cosyntropin test negative, no need for hydrocortisone taper to 10mg for 1 week and stop it.  -Low insulin level of 2 , c peptide within normal limits in dec 2019  - Work-up last month ruled out the usual endocrine causes (cosyntropin test was normal, insulin levels were low-normal when glucose was low  - tapered HC and will discontinue 10 mg of hydrocortisone on 1/24  - DDs likley malnutrition Vs Methadone induced  - Decreasing Methadone 80 q8 to Methadone 60 q8 PRN  - On D5 iv fluid. if hypoglycemic, push D50 1 amp again    # hypothyroidism  - c/w levothyroxine 100. TSH 14 (going down)  - OP f/u with endo    # Behavioral abnormalities  - Psych eval noted: patient deemed not suicidal and can make his own decisions  - He signed AMA on 1/11 but did not leave  - Seek more pain meds, already on methadone 80 q8 (Decreasing Methadone 80 q8 to Methadone 60 q8 PRN) and oxycodone IR 30 q8. No more pain meds    # Acute normo anemia  - no active bleed  - keep active type and screen  - transfuse to keep Hb > 7  - Iron studies from Nov 2019 shows anemia of chronic disease + iron def  - Started Aranesp 20 mcg q weekly from today    # PVD s/p right side AKA and left below knee amputation  - incision sites look clean  - Removal of staples from LLE stump on 1/19    # h/o esophageal candidiasis  - Completed 14 days of fluconazole course,   - outpt GI fu    # opioid dependance  - cw methadone, 60 q8 PRN and Oxycodone 30mg q8  - outpt clinic fu on discharge    # HTN (not on any medication)    # suspected folate / vit D / Vit b12 DEF  c/w cholecalceferol    # Pt looks malnourished  - Dietician eval noted  - started megace    # Sacral and buttock pressure sores  - Burn eval:  Healing sacral and buttock pressure sores--> no infection as per burn   - soap and water qd, santyl ointment and dakin solution wet to dry dressing , ABD pad dressing change qd  - no surgery needed    PLAN: Pending [facility]    DVT PPX: heparin  GI PPx: not indicated  Dispo: [facility] on hospice  Diet: K restricted  Activity: OOBTC.  CODE: DNR/DNI/no feeding tubes/no hemodialysis/no iv antibiotics,/no blood draws

## 2020-01-27 NOTE — PROGRESS NOTE ADULT - SUBJECTIVE AND OBJECTIVE BOX
LENGTH OF HOSPITAL STAY: 17d    CHIEF COMPLAINT:   Patient is a 59y old  Male who presents with a chief complaint of Septic shock (25 Jan 2020 10:20)      Overnight events:    No acute events overnight    ALLERGIES:  sulfamethizole (Unknown)    MEDICATIONS:  STANDING MEDICATIONS  amitriptyline 25 milliGRAM(s) Oral at bedtime  calcium acetate 1334 milliGRAM(s) Oral three times a day with meals  chlorhexidine 4% Liquid 1 Application(s) Topical <User Schedule>  cholecalciferol 2000 Unit(s) Oral daily  collagenase Ointment 1 Application(s) Topical two times a day  Dakins Solution - 1/2 Strength 1 Application(s) Topical two times a day  darbepoetin Injectable ViaL 20 MICROGram(s) SubCutaneous every week  dextrose 10%. 250 milliLiter(s) IV Continuous <Continuous>  dextrose 5% + sodium chloride 0.45%. 1000 milliLiter(s) IV Continuous <Continuous>  dextrose 5% + sodium chloride 0.45%. 1000 milliLiter(s) IV Continuous <Continuous>  doxycycline hyclate Capsule 100 milliGRAM(s) Oral every 12 hours  dronabinol 2.5 milliGRAM(s) Oral every 12 hours  heparin  Injectable 5000 Unit(s) SubCutaneous every 12 hours  influenza   Vaccine 0.5 milliLiter(s) IntraMuscular once  levothyroxine 100 MICROGram(s) Oral daily  methadone    Tablet 60 milliGRAM(s) Oral every 8 hours  midodrine. 2.5 milliGRAM(s) Oral three times a day  nystatin Ointment 1 Application(s) Topical two times a day  oxybutynin 10 milliGRAM(s) Oral two times a day  polyethylene glycol 3350 17 Gram(s) Oral at bedtime  sevelamer carbonate 800 milliGRAM(s) Oral three times a day with meals  sodium bicarbonate 1300 milliGRAM(s) Oral three times a day  triamcinolone 0.1% Ointment 1 Application(s) Topical every 12 hours    PRN MEDICATIONS  acetaminophen   Tablet .. 650 milliGRAM(s) Oral every 6 hours PRN  ondansetron Injectable 4 milliGRAM(s) IV Push four times a day PRN  oxyCODONE    IR 30 milliGRAM(s) Oral every 8 hours PRN    VITALS:   T(F): 99.4  HR: 84  BP: 106/65  RR: 20  SpO2: --    LABS:                          Cultures:      RADIOLOGY:    PHYSICAL EXAM:  GEN: No acute distress, pale, NC/AT  LUNGS: Clear to auscultation bilaterally, no wheezes   HEART: S1/S2 present. RRR. No murmurs  ABD: Soft, non-tender, non-distended. Bowel sounds present  EXT: L BKA, R AKA. Sacral ulcers and lower back ulcer, foul smelling with surrounding erythema  NEURO: AAOX3    Intravenous access: No IV access  NG tube: None  Supra pubic Catheter: Placed (patient change by himself, no need of urology)

## 2020-01-28 NOTE — H&P ADULT - NSHPPHYSICALEXAM_GEN_ALL_CORE
GEN: look pale, malnurished, NC/AT  LUNGS: Clear to auscultation bilaterally, no wheezes   HEART: S1/S2 present. RRR. No murmurs  ABD: Soft, non-tender, non-distended. Bowel sounds present  EXT: L BKA, R AKA. Sacral ulcers and lower back ulcer, foul smelling with surrounding erythema  NEURO: AAOX4.     Intravenous access: No IV access  NG tube: None  Supra pubic Catheter: Placed (patient change by himself, no need of urology)

## 2020-01-28 NOTE — PROGRESS NOTE ADULT - SUBJECTIVE AND OBJECTIVE BOX
Hospice NP note:  Pt  is a 60 y/o admitted to [facility] Inpatient Level of care start date 1/27/2020  for acute pain mgt. Admitting dx Chronic renal failure stg 4, pt awaiting bed in [facility].        PMH: Paraplegia secondary to fall in his 20s.          PVD with left AKA          DM2  episodes of hypoglycemia          Multiple stg 4 decubiti +MRSA +ORSA.          Sepsis/septic shock          hyperthyroidism          HTN          CKD stg 4 declining dialysis          Esophageal candida          Anemia          suprapubic catheter          Metabolic acidosis    Pt currently receiving Oxycontin 10 mg by mouth twice daily, Oxycodone 30 mg by mouth Every 4 hours as needed for pain. Pain 10/10 on pain scale, described as sharp, shooting with occasional burning pain from knee to hips.  Recommend increase in Oxycontin as per discussion with Dr. Chavis.  Plan is for discharge to [facility] when pt comfortable and safe d/c in place. Awaiting available bed.

## 2020-01-28 NOTE — H&P ADULT - ASSESSMENT
58 yo M with hx of PAD s/p R AKA, L BKA, Paraplegia with multiple sacral ulcers (stage 4) on suprapubic catheter, HTN, CKD stage IV, Hypothyroidism Opioid dependence readmitted to hospice service. Patient was recently admitted to [facility] for  severe sepsis secondary to MRSA bacteremia secondary to sacral OM. Patient was also found to have worsening CKD stage IV. Patient was evaluated by Nephro and had discussion regarding possible dialysis in the near future. However, patient refused HD as well as IV abx for MRSA bacteremia from sacral OM. Patient explicitly reports that he wants comfort care and would like to be discharged to [facility]. Patient was seen by Hospice and Hospice recommended patient to be discharged from medicine and readmitted to Hospice for management of uncontrolled pain until patient has bed ready at [facility].       Uncontrolled pain likely 2/2 stage 4 sacral ulcers   - patient is on PO Oxycodone 30mg q4h and Oxycontin 20mg BID   - c/w bowel regimen   - soap and water qd, santyl ointment and dakin solution wet to dry dressing , ABD pad dressing change qd as per Burn recs.   - follow up Hospice     MRSA bacteremia likely from Sacral OM   - on Doxy 100mg bid  - Pt refused IV abx and wants comfort measures with PO abx.     Worsening CKD stage 4  - patient refused HD  - patient refused labs   - patient is on Hospice.     Hypoglycemia  - likely 2/2 malnutrition and methadone induced  - patient off methadone already.   - monitor FS and IVF D5    PAD s/p right side AKA and left below knee amputation  - incision sites look clean  - Removal of staples from LLE stump on 1/19    Hx esophageal candidiasis  - Completed 14 days of fluconazole course,   - outpt GI fu      DVT PPX: Heparin SC  GI PPx: not indicated  Diet: K restricted  Activity: OOBTC  Dispo: pending [facility]  CODE: DNR/DNI/no feeding tubes/no hemodialysis/no IV antibiotics blood draws.

## 2020-01-28 NOTE — CHART NOTE - NSCHARTNOTEFT_GEN_A_CORE
Called by hospice RN stating that patient is inpatient hospice, screaming in pain, and primary team is not giving pain medications as recommended (morphine 20 mg oral every 2 hours PRN and ativan 1 mg x 1) because patient previously required narcan after methadone use.  Called [facility] and spoke to RN, who stated that patient is sleeping comfortably.  As per EMR, patient received his PRN oxycodone approximately 4 hours ago and is ordered for PRN oxycodone every 4 hours.  Ordered one time dose of morphine in the event that patient required breakthrough pain before primary team arrives in morning.  Overall pain regimen should be addressed by primary team in AM.

## 2020-01-28 NOTE — PROGRESS NOTE ADULT - NSHPATTENDINGPLANDISCUSS_GEN_ALL_CORE
Housestaff
house staff, residents during rounds and nephrology attending
intern on call.
House staff

## 2020-01-28 NOTE — H&P ADULT - ATTENDING COMMENTS
Patient seen and examined independently. Agree with resident note with exceptions.     Pt admitted for Inpatient Hospice  - continue with recommendations made by Hospice  - Pt started on morphine drip  - morphine prn for break through  - comfort care measures

## 2020-01-28 NOTE — H&P ADULT - HISTORY OF PRESENT ILLNESS
58 yo M with hx of PAD s/p R AKA, L BKA, Paraplegia with multiple sacral ulcers (stage 4) on suprapubic catheter, HTN, CKD stage IV, Hypothyroidism Opioid dependence readmitted to hospice service. Patient was recently admitted to [facility] for  severe sepsis secondary to MRSA bacteremia secondary to sacral OM. Patient was also found to have worsening CKD stage IV. Patient was evaluated by Nephro and had discussion regarding possible dialysis in the near future. However, patient refused HD as well as IV abx for MRSA bacteremia from sacral OM. Patient explicitly reports that he wants comfort care and would like to be discharged to [facility]. Patient was seen by Hospice and Hospice recommended patient to be discharged from medicine and readmitted to Hospice for management of uncontrolled pain until patient has bed ready at [facility]. Patient wants DNR/DNI/no feeding tubes/no hemodialysis/no IV antibiotics blood draws.    Of note:   During last admission, patient's mom reports that patient has suicidal ideation. Patient was evaluated by Psych and reports that patient is low acute risk for suicide. An element of depression is present due to multiple comorbidities  Psych recommended enhanced supervision but no psychiatric medication. 60 yo M with hx of PAD s/p R AKA, L BKA, Paraplegia with multiple sacral ulcers (stage 4) on suprapubic catheter, HTN, CKD stage IV, Hypothyroidism Opioid dependence readmitted to hospice service. Patient was recently admitted to [facility] for severe sepsis secondary to MRSA bacteremia secondary to sacral OM. Patient was also found to have worsening CKD stage IV. Patient was evaluated by Nephro and had discussion regarding possible dialysis in the near future. However, patient refused HD as well as IV abx for MRSA bacteremia from sacral OM. Patient explicitly reports that he wants comfort care and would like to be discharged to [facility]. Patient was seen by Hospice and Hospice recommended patient to be discharged from medicine and admitted to Hospice for management of uncontrolled pain until patient has bed ready at [facility]. Patient wants DNR/DNI/no feeding tubes/no hemodialysis/no IV antibiotics blood draws.    Of note:   During last admission, patient's mom reports that patient has suicidal ideation. Patient was evaluated by Psych and reports that patient is low acute risk for suicide. An element of depression is present due to multiple comorbidities  Psych recommended enhanced supervision but no psychiatric medication.

## 2020-01-28 NOTE — PROGRESS NOTE ADULT - SUBJECTIVE AND OBJECTIVE BOX
LENGTH OF HOSPITAL STAY: 18d    CHIEF COMPLAINT:   Patient is a 59y old  Male who presents with a chief complaint of Septic shock (27 Jan 2020 07:21)      Overnight events:    No acute events overnight    ALLERGIES:  sulfamethizole (Unknown)    MEDICATIONS:  STANDING MEDICATIONS  amitriptyline 25 milliGRAM(s) Oral at bedtime  calcium acetate 1334 milliGRAM(s) Oral three times a day with meals  chlorhexidine 4% Liquid 1 Application(s) Topical <User Schedule>  cholecalciferol 2000 Unit(s) Oral daily  collagenase Ointment 1 Application(s) Topical two times a day  Dakins Solution - 1/2 Strength 1 Application(s) Topical two times a day  dextrose 10%. 250 milliLiter(s) IV Continuous <Continuous>  dextrose 5% + sodium chloride 0.45%. 1000 milliLiter(s) IV Continuous <Continuous>  dextrose 5% + sodium chloride 0.45%. 1000 milliLiter(s) IV Continuous <Continuous>  doxycycline hyclate Capsule 100 milliGRAM(s) Oral every 12 hours  dronabinol 2.5 milliGRAM(s) Oral every 12 hours  heparin  Injectable 5000 Unit(s) SubCutaneous every 12 hours  levothyroxine 100 MICROGram(s) Oral daily  midodrine. 2.5 milliGRAM(s) Oral three times a day  nystatin Ointment 1 Application(s) Topical two times a day  oxybutynin 10 milliGRAM(s) Oral two times a day  oxyCODONE    IR 30 milliGRAM(s) Oral every 4 hours  polyethylene glycol 3350 17 Gram(s) Oral at bedtime  polyethylene glycol 3350 17 Gram(s) Oral at bedtime  sevelamer carbonate 800 milliGRAM(s) Oral three times a day with meals  triamcinolone 0.1% Ointment 1 Application(s) Topical every 12 hours    PRN MEDICATIONS  acetaminophen   Tablet .. 650 milliGRAM(s) Oral every 6 hours PRN  morphine Concentrate 20 milliGRAM(s) Oral once PRN  ondansetron Injectable 4 milliGRAM(s) IV Push four times a day PRN    VITALS:   T(F): 97.8  HR: 85  BP: 106/68  RR: 20  SpO2: --    LABS:                          Cultures:      RADIOLOGY:    PHYSICAL EXAM:  GEN: look pale, malnurished, NC/AT  LUNGS: Clear to auscultation bilaterally, no wheezes   HEART: S1/S2 present. RRR. No murmurs  ABD: Soft, non-tender, non-distended. Bowel sounds present  EXT: L BKA, R AKA. Sacral ulcers and lower back ulcer, foul smelling with surrounding erythema  NEURO: AAOX3    Intravenous access: No IV access  NG tube: None  Supra pubic Catheter: Placed (patient change by himself, no need of urology)

## 2020-01-28 NOTE — CHART NOTE - NSCHARTNOTEFT_GEN_A_CORE
Registered Dietitian Limited Follow up:    -As per MD and Palliative care progress note, pt and family accepting of inpatient hospice care at this time with DNR/DNI/no feeding tubes/no hemodialysis/no iv antibiotics,/no blood draws. Awaiting placement to hospice facility ([facility]). Pt laying comfortably in bed at time of assessment, multiple family members at bedside. Pt continues to remain with poor intake, however, based on GOC decision no aggressive nutrition intervention at this time. Nutrition calculations/goals/interventions no longer appropriate to pt POC. Family members occasionally brings food from outside of hospital as well. Pt denied all offer of nutrition supplements. Tolerating food texture well. Continue with Renal diet order at this time. No further nutrition interventions. RD sign off, recall PRN.

## 2020-01-28 NOTE — PROGRESS NOTE ADULT - ATTENDING COMMENTS
I was Physically Present for the key portions of the evaluation   I agree with the above History  , Physical examination Assessment and plan   I have Reviewed , Modified or appended where appropriate.  Please check A and P as above   1- CKD 4/ acidosis/ continue sodium bicarb  follow BMP  No need for RRT  will follow as OP
as above.
I saw and examined the patient   cr is stable  Patient with sacral ulcer, still with leukocytosis  Continue with antibiotics, pending burn consult.     #Progress Note Handoff:  Pending (specify): improving sacral ulcer infection, burn evaluation, placement  Family discussion:  Disposition: complicated, SNF
Patient seen and examined independently. Agree with resident note/ history / physical exam and plan of care with following exceptions/additions/updates. Case discussed with house-staff, nursing and patient.     #primary team discussed goals of care:  DNR/ DNI/ no feeding tube/no dialysis/ limited medical intervention  # Discussed with patient plan of care.   # Disposition: possible dc to home with hospice vs [facility] ( comfort measures) after the weekend ( pt lives alone)
Patient and family accepting for hospice care.   Transfer to hospice today. Eventually hospice placement
long discussion with pt about indications to dialsyis. will continue discussing tomorrow. pt would benefit from dialysis at this point.

## 2020-01-28 NOTE — PROGRESS NOTE ADULT - ASSESSMENT
59 years old male known to have PVD s/p right side AKA and left below knee amputation, paraplegia with multiple chronic bilateral buttock and sacral stage 4 ulcers, suprapubic catheter, HTN (not on any medication), CKD stage IV (not following with nephrology), hypothyroidism, opioid dependance, recent admission for cold left foot (chronic left foot dry gangrene, h/o esophageal candidiasis was sent to the rehab due to worsening kidney functions and hypoglycemia with FS 45.    In the ED, pt remained hemodynamically stable but looks very pale, no active bleeding, lab workup showed leukocytosis of 19, normo anemia with Hb 7.7 (vbg 8.7), potassium 6.5 (hemolyzed) repeat 5.2, anion gap metabolic acidosis, worsening CKD 4 with creat 4.1 (baseline 3.5), , cxr ok.    1/21/2020: Pt refused every IV antibiotics and labs and want hospice. He showed suicidal ideation to his mother, Mother told the nurse. Consulted psychiatry  1/23/2020: Pt was hypoglycemic overnight with 23 FS, D50 and narcan was pushed and D10 drip was started. Decreasing Methadone 80 q8 to Methadone 60 q8 PRN    # Septic shock - Now vitally stable  - Had Fever of 101, hypotensive to the 60s systolic, blood pressure perked up after NS bolus and PRBC. lactate 1.4  - Leukocytosis improved from 27K to 6K. No fever in last 48 hours.  - Likely the source is sacral ulcer - chronic OM  - 1 blood culture on 1/15 was positive for MRSA. 1/16 negative, repeat BCx from 1/20 are pending  - Was on NS then was on D5 with HCO3 and now refusing fluids.   - Echo: No vegetations can bee seen on TTE  - Started Doxy 100mg bid  - Pt refused every IV antibiotics and labs and want to go home    # Suicidal ideation  - He showed suicidal ideation to his mother, Mother told the nurse.   - Consulted psychiatry -> Low acute risk for suicide. An element of depression is present due to multiple comorbidities  - Recommended enhanced supervision but no psychiatric medication   - he may however benefit from supportive psychotherapy at home if he is discharged home or if the supervised hospice facility provides this service.   - no psychiatric contraindications to discharge    # CKD 4 - Creatinine stable around 4.4:  - Creatinine stable around 4.4 , no indication for RRT  - Nephro is following, will f/u OP  - Increased the dose of phosp binders today, please continue  - Pt refused hemodialysis    # Metabolic acidosis: Non anion gap metabolic acidosis, bicarb improved from 13 to 20.  - increased sodium bicarb to 1300 q 8  - s/p D5 with HCO3 - Now refusing Iv drip    # Hypoglycemia  -No evidence of adrenal insufficiency from blood work before in dec 2019 cosyntropin test negative, no need for hydrocortisone taper to 10mg for 1 week and stop it.  -Low insulin level of 2 , c peptide within normal limits in dec 2019  - Work-up last month ruled out the usual endocrine causes (cosyntropin test was normal, insulin levels were low-normal when glucose was low  - tapered HC and will discontinue 10 mg of hydrocortisone on 1/24  - DDs likley malnutrition Vs Methadone induced  - Decreasing Methadone 80 q8 to Methadone 60 q8 PRN  - On D5 iv fluid. if hypoglycemic, push D50 1 amp again    # hypothyroidism  - c/w levothyroxine 100. TSH 14 (going down)  - OP f/u with endo    # Behavioral abnormalities  - Psych eval noted: patient deemed not suicidal and can make his own decisions  - He signed AMA on 1/11 but did not leave  - Seek more pain meds, already on methadone 80 q8 (Decreasing Methadone 80 q8 to Methadone 60 q8 PRN) and oxycodone IR 30 q8. No more pain meds    # Acute normo anemia  - no active bleed  - keep active type and screen  - transfuse to keep Hb > 7  - Iron studies from Nov 2019 shows anemia of chronic disease + iron def  - Started Aranesp 20 mcg q weekly from today    # PVD s/p right side AKA and left below knee amputation  - incision sites look clean  - Removal of staples from LLE stump on 1/19    # h/o esophageal candidiasis  - Completed 14 days of fluconazole course,   - outpt GI fu    # opioid dependance  - cw methadone, 60 q8 PRN and Oxycodone 30mg q8  - outpt clinic fu on discharge    # HTN (not on any medication)    # suspected folate / vit D / Vit b12 DEF  c/w cholecalceferol    # Pt looks malnourished  - Dietician eval noted  - started megace    # Sacral and buttock pressure sores  - Burn eval:  Healing sacral and buttock pressure sores--> no infection as per burn   - soap and water qd, santyl ointment and dakin solution wet to dry dressing , ABD pad dressing change qd  - no surgery needed    PLAN: [facility]    DVT PPX: heparin  GI PPx: not indicated  Dispo: [facility] hosp[facility]  Diet: K restricted  Activity: OOBTC.  CODE: DNR/DNI/no feeding tubes/no hemodialysis/no iv antibiotics,/no blood draws 59 years old male known to have PVD s/p right side AKA and left below knee amputation, paraplegia with multiple chronic bilateral buttock and sacral stage 4 ulcers, suprapubic catheter, HTN (not on any medication), CKD stage IV (not following with nephrology), hypothyroidism, opioid dependance, recent admission for cold left foot (chronic left foot dry gangrene, h/o esophageal candidiasis was sent to the rehab due to worsening kidney functions and hypoglycemia with FS 45.    In the ED, pt remained hemodynamically stable but looks very pale, no active bleeding, lab workup showed leukocytosis of 19, normo anemia with Hb 7.7 (vbg 8.7), potassium 6.5 (hemolyzed) repeat 5.2, anion gap metabolic acidosis, worsening CKD 4 with creat 4.1 (baseline 3.5), , cxr ok.    1/21/2020: Pt refused every IV antibiotics and labs and want hospice. He showed suicidal ideation to his mother, Mother told the nurse. Consulted psychiatry  1/23/2020: Pt was hypoglycemic overnight with 23 FS, D50 and narcan was pushed and D10 drip was started. Decreasing Methadone 80 q8 to Methadone 60 q8 PRN    # Septic shock - Now vitally stable  - Had Fever of 101, hypotensive to the 60s systolic, blood pressure perked up after NS bolus and PRBC. lactate 1.4  - Leukocytosis improved from 27K to 6K. No fever in last 48 hours.  - Likely the source is sacral ulcer - chronic OM  - 1 blood culture on 1/15 was positive for MRSA. 1/16 negative, repeat BCx from 1/20 are pending  - Was on NS then was on D5 with HCO3 and now refusing fluids.   - Echo: No vegetations can bee seen on TTE  - Started Doxy 100mg bid  - Pt refused every IV antibiotics and labs and want to go home    # Suicidal ideation  - He showed suicidal ideation to his mother, Mother told the nurse.   - Consulted psychiatry -> Low acute risk for suicide. An element of depression is present due to multiple comorbidities  - Recommended enhanced supervision but no psychiatric medication   - he may however benefit from supportive psychotherapy at home if he is discharged home or if the supervised hospice facility provides this service.   - no psychiatric contraindications to discharge    # CKD 4 - Creatinine stable around 4.4:  - Creatinine stable around 4.4 , no indication for RRT  - Nephro is following, will f/u OP  - Increased the dose of phosp binders today, please continue  - Pt refused hemodialysis    # Metabolic acidosis: Non anion gap metabolic acidosis, bicarb improved from 13 to 20.  - increased sodium bicarb to 1300 q 8  - s/p D5 with HCO3 - Now refusing Iv drip    # Hypoglycemia  -No evidence of adrenal insufficiency from blood work before in dec 2019 cosyntropin test negative, no need for hydrocortisone taper to 10mg for 1 week and stop it.  -Low insulin level of 2 , c peptide within normal limits in dec 2019  - Work-up last month ruled out the usual endocrine causes (cosyntropin test was normal, insulin levels were low-normal when glucose was low  - tapered HC and will discontinue 10 mg of hydrocortisone on 1/24  - DDs likley malnutrition Vs Methadone induced  - (discontinued methadone for hypoglycemia and giving Oxycodone IR 30 q4 and ER 10 q 12)   - On D5 iv fluid. if hypoglycemic, push D50 1 amp again    # hypothyroidism  - c/w levothyroxine 100. TSH 14 (going down)  - OP f/u with endo    # Behavioral abnormalities  - Psych eval noted: patient deemed not suicidal and can make his own decisions  - He signed AMA on 1/11 but did not leave  - Seek more pain meds, already on methadone 80 q8 (discontinued methadone for hypoglycemia and giving Oxycodone IR 30 q4 and ER 10 q 12)     # Acute normo anemia  - no active bleed  - keep active type and screen  - transfuse to keep Hb > 7  - Iron studies from Nov 2019 shows anemia of chronic disease + iron def  - Started Aranesp 20 mcg q weekly from today    # PVD s/p right side AKA and left below knee amputation  - incision sites look clean  - Removal of staples from LLE stump on 1/19    # h/o esophageal candidiasis  - Completed 14 days of fluconazole course,   - outpt GI fu    # opioid dependance  - (discontinued methadone for hypoglycemia and giving Oxycodone IR 30 q4 and ER 10 q 12)     # HTN (not on any medication)    # suspected folate / vit D / Vit b12 DEF  c/w cholecalceferol    # Pt looks malnourished  - Dietician eval noted  - started megace    # Sacral and buttock pressure sores  - Burn eval:  Healing sacral and buttock pressure sores--> no infection as per burn   - soap and water qd, santyl ointment and dakin solution wet to dry dressing , ABD pad dressing change qd  - no surgery needed    PLAN: [facility]    DVT PPX: heparin  GI PPx: not indicated  Dispo: [facility]  Diet: K restricted  Activity: OOBTC.  CODE: DNR/DNI/no feeding tubes/no hemodialysis/no iv antibiotics,/no blood draws 59 years old male known to have PVD s/p right side AKA and left below knee amputation, paraplegia with multiple chronic bilateral buttock and sacral stage 4 ulcers, suprapubic catheter, HTN (not on any medication), CKD stage IV (not following with nephrology), hypothyroidism, opioid dependance, recent admission for cold left foot (chronic left foot dry gangrene, h/o esophageal candidiasis was sent to the rehab due to worsening kidney functions and hypoglycemia with FS 45.    In the ED, pt remained hemodynamically stable but looks very pale, no active bleeding, lab workup showed leukocytosis of 19, normo anemia with Hb 7.7 (vbg 8.7), potassium 6.5 (hemolyzed) repeat 5.2, anion gap metabolic acidosis, worsening CKD 4 with creat 4.1 (baseline 3.5), , cxr ok.    1/21/2020: Pt refused every IV antibiotics and labs and want hospice. He showed suicidal ideation to his mother, Mother told the nurse. Consulted psychiatry  1/23/2020: Pt was hypoglycemic overnight with 23 FS, D50 and narcan was pushed and D10 drip was started.     # Septic shock - Now vitally stable  - Had Fever of 101, hypotensive to the 60s systolic, blood pressure perked up after NS bolus and PRBC. lactate 1.4  - Leukocytosis improved from 27K to 6K. No fever in last 48 hours.  - Likely the source is sacral ulcer - chronic OM  - 1 blood culture on 1/15 was positive for MRSA. 1/16 negative, repeat BCx from 1/20 are pending  - Was on NS then was on D5 with HCO3 and now refusing fluids.   - Echo: No vegetations can bee seen on TTE  - Started Doxy 100mg bid  - Pt refused every IV antibiotics and labs and want to go home    # Suicidal ideation  - He showed suicidal ideation to his mother, Mother told the nurse.   - Consulted psychiatry -> Low acute risk for suicide. An element of depression is present due to multiple comorbidities  - Recommended enhanced supervision but no psychiatric medication   - he may however benefit from supportive psychotherapy at home if he is discharged home or if the supervised hospice facility provides this service.   - no psychiatric contraindications to discharge    # CKD 4 - Creatinine stable around 4.4:  - Creatinine stable around 4.4 , no indication for RRT  - Nephro is following, will f/u OP  - Increased the dose of phosp binders today, please continue  - Pt refused hemodialysis    # Metabolic acidosis: Non anion gap metabolic acidosis, bicarb improved from 13 to 20.  - increased sodium bicarb to 1300 q 8  - s/p D5 with HCO3 - Now refusing Iv drip    # Hypoglycemia  -No evidence of adrenal insufficiency from blood work before in dec 2019 cosyntropin test negative, no need for hydrocortisone taper to 10mg for 1 week and stop it.  -Low insulin level of 2 , c peptide within normal limits in dec 2019  - Work-up last month ruled out the usual endocrine causes (cosyntropin test was normal, insulin levels were low-normal when glucose was low  - tapered HC and will discontinue 10 mg of hydrocortisone on 1/24  - DDs likley malnutrition Vs Methadone induced    - On D5 iv fluid. if hypoglycemic, push D50 1 amp again    # hypothyroidism  - c/w levothyroxine 100. TSH 14 (going down)  - OP f/u with endo    # Behavioral abnormalities  - Psych eval noted: patient deemed not suicidal and can make his own decisions  - He signed AMA on 1/11 but did not leave  - Seek more pain meds,    # Acute normo anemia  - no active bleed  - keep active type and screen  - transfuse to keep Hb > 7  - Iron studies from Nov 2019 shows anemia of chronic disease + iron def  - Started Aranesp 20 mcg q weekly from today    # PVD s/p right side AKA and left below knee amputation  - incision sites look clean  - Removal of staples from LLE stump on 1/19    # h/o esophageal candidiasis  - Completed 14 days of fluconazole course,   - outpt GI fu    # HTN (not on any medication)    # suspected folate / vit D / Vit b12 DEF  c/w cholecalceferol    # Pt looks malnourished  - Dietician eval noted  - started megace    # Sacral and buttock pressure sores  - Burn eval:  Healing sacral and buttock pressure sores--> no infection as per burn   - soap and water qd, santyl ointment and dakin solution wet to dry dressing , ABD pad dressing change qd  - no surgery needed    PLAN: [facility]    DVT PPX: heparin  GI PPx: not indicated  Dispo: [facility]  Diet: K restricted  Activity: OOBTC.  CODE: DNR/DNI/no feeding tubes/no hemodialysis/no iv antibiotics,/no blood draws

## 2020-01-29 NOTE — PROGRESS NOTE ADULT - SUBJECTIVE AND OBJECTIVE BOX
LENGTH OF HOSPITAL STAY: 18d    CHIEF COMPLAINT:   Patient is a 59y old  Male who presents with a chief complaint of Septic shock/CKD 4 (28 Jan 2020 14:35)      Overnight events:    No acute events overnight    ALLERGIES:  sulfamethizole (Unknown)    MEDICATIONS:  STANDING MEDICATIONS    PRN MEDICATIONS    VITALS:   T(F): 97  HR: 88  BP: 125/72  RR: 18  SpO2: 97%    LABS:                          Cultures:      RADIOLOGY:    PHYSICAL EXAM:  GEN: look pale, malnurished, NC/AT  LUNGS: Clear to auscultation bilaterally, no wheezes   HEART: S1/S2 present. RRR. No murmurs  ABD: Soft, non-tender, non-distended. Bowel sounds present  EXT: L BKA, R AKA. Sacral ulcers and lower back ulcer, foul smelling with surrounding erythema  NEURO: AAOX3    Intravenous access: No IV access  NG tube: None  Supra pubic Catheter: Placed (patient change by himself, no need of urology)

## 2020-01-29 NOTE — PROGRESS NOTE ADULT - ASSESSMENT
58 yo M with hx of PAD s/p R AKA, L BKA, Paraplegia with multiple sacral ulcers (stage 4) on suprapubic catheter, HTN, CKD stage IV, Hypothyroidism Opioid dependence readmitted to hospice service. Patient was recently admitted to [facility] for  severe sepsis secondary to MRSA bacteremia secondary to sacral OM. Patient was also found to have worsening CKD stage IV. Patient was evaluated by Nephro and had discussion regarding possible dialysis in the near future. However, patient refused HD as well as IV abx for MRSA bacteremia from sacral OM. Patient explicitly reports that he wants comfort care and would like to be discharged to [facility]. Patient was seen by Hospice and Hospice recommended patient to be discharged from medicine and readmitted to Hospice for management of uncontrolled pain until patient has bed ready at [facility].       Uncontrolled pain likely 2/2 stage 4 sacral ulcers   - patient is on PO Oxycodone 30mg q4h and Oxycontin 20mg BID   - c/w bowel regimen   - soap and water qd, santyl ointment and dakin solution wet to dry dressing , ABD pad dressing change qd as per Burn recs.   - follow up Hospice     MRSA bacteremia likely from Sacral OM   - on Doxy 100mg bid  - Pt refused IV abx and wants comfort measures with PO abx.     Worsening CKD stage 4  - patient refused HD  - patient refused labs   - patient is on Hospice.     Hypoglycemia  - likely 2/2 malnutrition and methadone induced  - patient off methadone already.   - monitor FS and IVF D5    PAD s/p right side AKA and left below knee amputation  - incision sites look clean  - Removal of staples from LLE stump on 1/19    Hx esophageal candidiasis  - Completed 14 days of fluconazole course,   - outpt GI fu      DVT PPX: Heparin SC  GI PPx: not indicated  Diet: K restricted  Activity: OOBTC  Dispo: pending [facility]  CODE: DNR/DNI/no feeding tubes/no hemodialysis/no IV antibiotics blood draws. 58 yo M with hx of PAD s/p R AKA, L BKA, Paraplegia with multiple sacral ulcers (stage 4) on suprapubic catheter, HTN, CKD stage IV, Hypothyroidism Opioid dependence readmitted to hospice service. Patient was recently admitted to [facility] for  severe sepsis secondary to MRSA bacteremia secondary to sacral OM. Patient was also found to have worsening CKD stage IV. Patient was evaluated by Nephro and had discussion regarding possible dialysis in the near future. However, patient refused HD as well as IV abx for MRSA bacteremia from sacral OM. Patient explicitly reports that he wants comfort care and would like to be discharged to [facility]. Patient was seen by Hospice and Hospice recommended patient to be discharged from medicine and readmitted to Hospice for management of uncontrolled pain until patient has bed ready at [facility].   * Patient is on HOSPICE service * hospice nurses are available 24 hours. any question please call numbers on manual chart in rack    Uncontrolled pain likely 2/2 stage 4 sacral ulcers   - patient is on morphine gtt 2ml/hour and morphine 4mg every 30 min PRN  - c/w bowel regimen   - soap and water qd, santyl ointment and dakin solution wet to dry dressing , ABD pad dressing change qd as per Burn recs.   - follow up Hospice   - Palliative is also following    MRSA bacteremia likely from Sacral OM   - on Doxy 100mg bid  - Pt refused IV abx and wants comfort measures with PO abx.     Worsening CKD stage 4  - patient refused HD  - patient refused labs   - patient is on Hospice.     Hypoglycemia  - likely 2/2 malnutrition and methadone induced  - patient off methadone already.   - monitor FS and IVF D5    PAD s/p right side AKA and left below knee amputation  - incision sites look clean  - Removal of staples from LLE stump on 1/19    Hx esophageal candidiasis  - Completed 14 days of fluconazole course,   - outpt GI fu      DVT PPX: Heparin SC  GI PPx: not indicated  Diet: K restricted  Activity: OOBTC  Dispo: pending [facility]  CODE: DNR/DNI/no feeding tubes/no hemodialysis/no IV antibiotics blood draws.

## 2020-01-29 NOTE — CONSULT NOTE ADULT - SUBJECTIVE AND OBJECTIVE BOX
REQUESTED OF: DR Benites    PATIENT     MARGE BELLA 59yMale  HPI:  58 yo M with hx of PAD s/p R AKA, L BKA, Paraplegia with multiple sacral ulcers (stage 4) on suprapubic catheter, HTN, CKD stage IV, Hypothyroidism Opioid dependence readmitted to hospice service. Patient was recently admitted to [facility] for severe sepsis secondary to MRSA bacteremia secondary to sacral OM. Patient was also found to have worsening CKD stage IV. Patient was evaluated by Nephro and had discussion regarding possible dialysis in the near future. However, patient refused HD as well as IV abx for MRSA bacteremia from sacral OM. Patient explicitly reports that he wants comfort care and would like to be discharged to [facility]. Patient was seen by Hospice and Hospice recommended patient to be discharged from medicine and admitted to Hospice for management of uncontrolled pain until patient has bed ready at [facility]. Patient wants DNR/DNI/no feeding tubes/no hemodialysis/no IV antibiotics blood draws.    Of note:   During last admission, patient's mom reports that patient has suicidal ideation. Patient was evaluated by Psych and reports that patient is low acute risk for suicide. An element of depression is present due to multiple comorbidities  Psych recommended enhanced supervision but no psychiatric medication. (28 Jan 2020 15:41)        PAST MEDICAL & SURGICAL HISTORY:  Anemia  PAD (peripheral artery disease)  Hypertension  Suprapubic catheter  Pressure ulcer  Diabetes  Lumbar compression fracture  S/P below knee amputation, left  S/P below knee amputation, right  S/P debridement  Toe amputation status, right  H/O hernia repair      Subjective and Objective:  Today,  Discussed with     Focused Palliative Care Evaluation:                   Symptoms:                                      Pain                                     Dyspnea                                     N/V                                     Appetite                                     Anxiety                                     Other _____________________                     Support Devices:              PHYSICAL EXAM:      Constitutional:    Eyes:    ENMT:    Neck:    Breasts:    Back:    Respiratory:    Cardiovascular:    Gastrointestinal:    Genitourinary:    Rectal:    Extremities:    Vascular:    Neurological:    Skin:    Lymph Nodes:    Musculoskeletal:    Psychiatric:        T(C): 36.1, Max: 36.1 (05:45)  HR: 88 (88 - 94)  BP: 125/72 (112/66 - 125/72)  RR: 18 (18 - 20)  SpO2: 97% (96% - 97%)      LABS/STUDIES:              MEDICATIONS  (STANDING):  amitriptyline 25 milliGRAM(s) Oral at bedtime  collagenase Ointment 1 Application(s) Topical two times a day  Dakins Solution - 1/2 Strength 1 Application(s) Topical two times a day  dextrose 5% + sodium chloride 0.45%. 1000 milliLiter(s) (75 mL/Hr) IV Continuous <Continuous>  dronabinol 2.5 milliGRAM(s) Oral every 12 hours  levothyroxine 100 MICROGram(s) Oral daily  morphine  Infusion 4 mG/Hr (4 mL/Hr) IV Continuous <Continuous>  oxybutynin 10 milliGRAM(s) Oral two times a day  polyethylene glycol 3350 17 Gram(s) Oral at bedtime  triamcinolone 0.1% Oral Paste 1 Application(s) Topical two times a day    MEDICATIONS  (PRN):  LORazepam   Injectable 0.5 milliGRAM(s) IV Push every 6 hours PRN Agitation  morphine  - Injectable 4 milliGRAM(s) IV Push every 30 minutes PRN Mild Pain (1 - 3)          iStop: reviewed        PPS  Level    __________       Note PPS = Palliative Performance Scale; (c)2001, [employer]       Range from 100% meaning Full ambulation/self-care/intake/Level of Consciousness                                                                              to        10% meaning Bedbound/Unable to do any activity/extensive disease /Total Care/ No PO intake/ LOC=Full/drowsy/+/-confusion        (0% = death)                     Prior to acute illness, patient's functionality reportedly REQUESTED OF: DR Benites    PATIENT IS CURRENTLY UNDER INPATIENT HOSPICE CARE    MARGE BELLA 59yMale  HPI:  58 yo M with hx of PAD s/p R AKA, L BKA, Paraplegia with multiple sacral ulcers (stage 4) on suprapubic catheter, HTN, CKD stage IV, Hypothyroidism Opioid dependence readmitted to hospice service. Patient was recently admitted to [facility] for severe sepsis secondary to MRSA bacteremia secondary to sacral OM. Patient was also found to have worsening CKD stage IV. Patient was evaluated by Nephro and had discussion regarding possible dialysis in the near future. However, patient refused HD as well as IV abx for MRSA bacteremia from sacral OM. Patient explicitly reports that he wants comfort care and would like to be discharged to [facility]. Patient was seen by Hospice and Hospice recommended patient to be discharged from medicine and admitted to Hospice for management of uncontrolled pain until patient has bed ready at [facility]. Patient wants DNR/DNI/no feeding tubes/no hemodialysis/no IV antibiotics blood draws.    Of note:   During last admission, patient's mom reports that patient has suicidal ideation. Patient was evaluated by Psych and reports that patient is low acute risk for suicide. An element of depression is present due to multiple comorbidities  Psych recommended enhanced supervision but no psychiatric medication. (28 Jan 2020 15:41)        PAST MEDICAL & SURGICAL HISTORY:  Anemia  PAD (peripheral artery disease)  Hypertension  Suprapubic catheter  Pressure ulcer  Diabetes  Lumbar compression fracture  S/P below knee amputation, left  S/P below knee amputation, right  S/P debridement  Toe amputation status, right  H/O hernia repair      Subjective and Objective:  Today,  Discussed with Hospice RN  and unit staff. Pt has had persistent pain over last 24-48 hrs despite current pain regimen. He is lethargic/arousable. Reports current pain meds are not providing any relief. No N/V/dyspnea. Minimal po intake.     Focused Palliative Care Evaluation:                   Symptoms:                                      Pain ++                                     Dyspnea                                     N/V                                     Appetite                                     Anxiety +                                     Other _____________________                     Support Devices: none             PHYSICAL EXAM:      adult man, oriented x 3, in pain        T(C): 36.1, Max: 36.1 (05:45)  HR: 88 (88 - 94)  BP: 125/72 (112/66 - 125/72)  RR: 18 (18 - 20)  SpO2: 97% (96% - 97%)      LABS/STUDIES:              MEDICATIONS  (STANDING):  amitriptyline 25 milliGRAM(s) Oral at bedtime  collagenase Ointment 1 Application(s) Topical two times a day  Dakins Solution - 1/2 Strength 1 Application(s) Topical two times a day  dextrose 5% + sodium chloride 0.45%. 1000 milliLiter(s) (75 mL/Hr) IV Continuous <Continuous>  dronabinol 2.5 milliGRAM(s) Oral every 12 hours  levothyroxine 100 MICROGram(s) Oral daily  morphine  Infusion 4 mG/Hr (4 mL/Hr) IV Continuous <Continuous>  oxybutynin 10 milliGRAM(s) Oral two times a day  polyethylene glycol 3350 17 Gram(s) Oral at bedtime  triamcinolone 0.1% Oral Paste 1 Application(s) Topical two times a day    MEDICATIONS  (PRN):  LORazepam   Injectable 0.5 milliGRAM(s) IV Push every 6 hours PRN Agitation  morphine  - Injectable 4 milliGRAM(s) IV Push every 30 minutes PRN Mild Pain (1 - 3)          iStop: reviewed        PPS  Level    __________       Note PPS = Palliative Performance Scale; (c)2001, [employer]       Range from 100% meaning Full ambulation/self-care/intake/Level of Consciousness                                                                              to        10% meaning Bedbound/Unable to do any activity/extensive disease /Total Care/ No PO intake/ LOC=Full/drowsy/+/-confusion        (0% = death)                     Prior to acute illness, patient's functionality reportedly REQUESTED OF: DR Benites    PATIENT IS CURRENTLY UNDER INPATIENT HOSPICE CARE    MARGE BELLA 59yMale  HPI:  60 yo M with hx of PAD s/p R AKA, L BKA, Paraplegia with multiple sacral ulcers (stage 4) on suprapubic catheter, HTN, CKD stage IV, Hypothyroidism Opioid dependence readmitted to hospice service. Patient was recently admitted to [facility] for severe sepsis secondary to MRSA bacteremia secondary to sacral OM. Patient was also found to have worsening CKD stage IV. Patient was evaluated by Nephro and had discussion regarding possible dialysis in the near future. However, patient refused HD as well as IV abx for MRSA bacteremia from sacral OM. Patient explicitly reports that he wants comfort care and would like to be discharged to [facility]. Patient was seen by Hospice and Hospice recommended patient to be discharged from medicine and admitted to Hospice for management of uncontrolled pain until patient has bed ready at [facility]. Patient wants DNR/DNI/no feeding tubes/no hemodialysis/no IV antibiotics blood draws.    Of note:   During last admission, patient's mom reports that patient has suicidal ideation. Patient was evaluated by Psych and reports that patient is low acute risk for suicide. An element of depression is present due to multiple comorbidities  Psych recommended enhanced supervision but no psychiatric medication. (28 Jan 2020 15:41)        PAST MEDICAL & SURGICAL HISTORY:  Anemia  PAD (peripheral artery disease)  Hypertension  Suprapubic catheter  Pressure ulcer  Diabetes  Lumbar compression fracture  S/P below knee amputation, left  S/P below knee amputation, right  S/P debridement  Toe amputation status, right  H/O hernia repair      Subjective and Objective:  Today,  Discussed with Hospice RN  and unit staff. Pt has had persistent pain over last 24-48 hrs despite current pain regimen. He is lethargic/arousable. Reports current pain meds are not providing any relief. No N/V/dyspnea. Minimal po intake.     Focused Palliative Care Evaluation:                   Symptoms:                                      Pain ++                                     Dyspnea                                     N/V                                     Appetite                                     Anxiety +                                     Other _____________________                     Support Devices: none             PHYSICAL EXAM:      adult man, oriented x 3, in pain, pale, bedbound  PERRL  RRR  Abdom soft  b/l LE amputee  no edema          T(C): 36.1, Max: 36.1 (05:45)  HR: 88 (88 - 94)  BP: 125/72 (112/66 - 125/72)  RR: 18 (18 - 20)  SpO2: 97% (96% - 97%)      LABS/STUDIES:              MEDICATIONS  (STANDING):  amitriptyline 25 milliGRAM(s) Oral at bedtime  collagenase Ointment 1 Application(s) Topical two times a day  Dakins Solution - 1/2 Strength 1 Application(s) Topical two times a day  dextrose 5% + sodium chloride 0.45%. 1000 milliLiter(s) (75 mL/Hr) IV Continuous <Continuous>  dronabinol 2.5 milliGRAM(s) Oral every 12 hours  levothyroxine 100 MICROGram(s) Oral daily  morphine  Infusion 4 mG/Hr (4 mL/Hr) IV Continuous <Continuous>  oxybutynin 10 milliGRAM(s) Oral two times a day  polyethylene glycol 3350 17 Gram(s) Oral at bedtime  triamcinolone 0.1% Oral Paste 1 Application(s) Topical two times a day    MEDICATIONS  (PRN):  LORazepam   Injectable 0.5 milliGRAM(s) IV Push every 6 hours PRN Agitation  morphine  - Injectable 4 milliGRAM(s) IV Push every 30 minutes PRN Mild Pain (1 - 3)          iStop: reviewed        PPS  Level    __10________       Note PPS = Palliative Performance Scale; (c)2001, [employer]       Range from 100% meaning Full ambulation/self-care/intake/Level of Consciousness                                                                              to        10% meaning Bedbound/Unable to do any activity/extensive disease /Total Care/ No PO intake/ LOC=Full/drowsy/+/-confusion        (0% = death)                     Prior to acute illness, patient's functionality reportedly diminishing

## 2020-01-29 NOTE — PROGRESS NOTE ADULT - ASSESSMENT
59 years old male known to have PVD s/p right side AKA and left below knee amputation, paraplegia with multiple chronic bilateral buttock and sacral stage 4 ulcers, suprapubic catheter, HTN (not on any medication), CKD stage IV (not following with nephrology), hypothyroidism, opioid dependance, recent admission for cold left foot (chronic left foot dry gangrene, h/o esophageal candidiasis was sent to the rehab due to worsening kidney functions and hypoglycemia with FS 45.    In the ED, pt remained hemodynamically stable but looks very pale, no active bleeding, lab workup showed leukocytosis of 19, normo anemia with Hb 7.7 (vbg 8.7), potassium 6.5 (hemolyzed) repeat 5.2, anion gap metabolic acidosis, worsening CKD 4 with creat 4.1 (baseline 3.5), , cxr ok.    1/21/2020: Pt refused every IV antibiotics and labs and want hospice. He showed suicidal ideation to his mother, Mother told the nurse. Consulted psychiatry  1/23/2020: Pt was hypoglycemic overnight with 23 FS, D50 and narcan was pushed and D10 drip was started. Decreasing Methadone 80 q8 to Methadone 60 q8 PRN    # Septic shock - Now vitally stable  - Had Fever of 101, hypotensive to the 60s systolic, blood pressure perked up after NS bolus and PRBC. lactate 1.4  - Leukocytosis improved from 27K to 6K. No fever in last 48 hours.  - Likely the source is sacral ulcer - chronic OM  - 1 blood culture on 1/15 was positive for MRSA. 1/16 negative, repeat BCx from 1/20 are pending  - Was on NS then was on D5 with HCO3 and now refusing fluids.   - Echo: No vegetations can bee seen on TTE  - Started Doxy 100mg bid  - Pt refused every IV antibiotics and labs and want to go home    # Suicidal ideation  - He showed suicidal ideation to his mother, Mother told the nurse.   - Consulted psychiatry -> Low acute risk for suicide. An element of depression is present due to multiple comorbidities  - Recommended enhanced supervision but no psychiatric medication   - he may however benefit from supportive psychotherapy at home if he is discharged home or if the supervised hospice facility provides this service.   - no psychiatric contraindications to discharge    # CKD 4 - Creatinine stable around 4.4:  - Creatinine stable around 4.4 , no indication for RRT  - Nephro is following, will f/u OP  - Increased the dose of phosp binders today, please continue  - Pt refused hemodialysis    # Metabolic acidosis: Non anion gap metabolic acidosis, bicarb improved from 13 to 20.  - increased sodium bicarb to 1300 q 8  - s/p D5 with HCO3 - Now refusing Iv drip    # Hypoglycemia  -No evidence of adrenal insufficiency from blood work before in dec 2019 cosyntropin test negative, no need for hydrocortisone taper to 10mg for 1 week and stop it.  -Low insulin level of 2 , c peptide within normal limits in dec 2019  - Work-up last month ruled out the usual endocrine causes (cosyntropin test was normal, insulin levels were low-normal when glucose was low  - tapered HC and will discontinue 10 mg of hydrocortisone on 1/24  - DDs likley malnutrition Vs Methadone induced  - (discontinued methadone for hypoglycemia and giving Oxycodone IR 30 q4 and ER 10 q 12)   - On D5 iv fluid. if hypoglycemic, push D50 1 amp again    # hypothyroidism  - c/w levothyroxine 100. TSH 14 (going down)  - OP f/u with endo    # Behavioral abnormalities  - Psych eval noted: patient deemed not suicidal and can make his own decisions  - He signed AMA on 1/11 but did not leave  - Seek more pain meds, already on methadone 80 q8 (discontinued methadone for hypoglycemia and giving Oxycodone IR 30 q4 and ER 10 q 12)     # Acute normo anemia  - no active bleed  - keep active type and screen  - transfuse to keep Hb > 7  - Iron studies from Nov 2019 shows anemia of chronic disease + iron def  - Started Aranesp 20 mcg q weekly from today    # PVD s/p right side AKA and left below knee amputation  - incision sites look clean  - Removal of staples from LLE stump on 1/19    # h/o esophageal candidiasis  - Completed 14 days of fluconazole course,   - outpt GI fu    # opioid dependance  - (discontinued methadone for hypoglycemia and giving Oxycodone IR 30 q4 and ER 10 q 12)     # HTN (not on any medication)    # suspected folate / vit D / Vit b12 DEF  c/w cholecalceferol    # Pt looks malnourished  - Dietician eval noted  - started megace    # Sacral and buttock pressure sores  - Burn eval:  Healing sacral and buttock pressure sores--> no infection as per burn   - soap and water qd, santyl ointment and dakin solution wet to dry dressing , ABD pad dressing change qd  - no surgery needed    PLAN: [facility]ending [facility]    DVT PPX: heparin  GI PPx: not indicated  Dispo: [facility] on hospice  Diet: K restricted  Activity: OOBTC.  CODE: DNR/DNI/no feeding tubes/no hemodialysis/no iv antibiotics,/no blood draws

## 2020-01-29 NOTE — CONSULT NOTE ADULT - ASSESSMENT
59yMale being evaluated for pain management under Hospice care.     Reviewed pt's previous opioid requirements (Methadone 80mg q6h (MEDD for this alone is 4800), oxycodone, oxycontin, morphine 2mg IV) all previously given in varying amounts with varying relief.     Pt started on morphine gtt at 2mg/hr with 4mg IVP bolus given this am with no relief.  Morphine infusion titrated throughout the course of the day to 10mg/hr with ativan 0.5mg IVP- pain improving       Pt's mother at the bedside. Overwhelmed by pt's clinical condition and grave prognosis.   Discussed comfort care and treatment plan at length. All questions answered in detail.  Support provided by MD, RNs and SW      Recommendations:  cont morphine gtt @10mg/hr - will continue to titrate as needed  may administer morphine 10mg IVP q2h PRN breakthrough pain   DC all other opioids.  ativan 0.5mg IV q6h PRN anxiety/agitation      We will follow  [phone]  PLEASE CALL HOSPICE WITH ANY QUESTIONS OR  PRIOR TO ANY MEDICATION CHANGE

## 2020-01-29 NOTE — PROGRESS NOTE ADULT - REASON FOR ADMISSION
Septic shock
Septic shock/CKD 4
Elevated creatinine and hypoglycemia
Septic Shock
Septic shock 2/2 chronic OM (MRSA bacteremia)
elevated creatinine, hypoglycemia
elevated potassium and hypoglycemia
septic shock
septic shock
hypothyroidism

## 2020-01-29 NOTE — GOALS OF CARE CONVERSATION - ADVANCED CARE PLANNING - CONVERSATION DETAILS
Palliative care in  collaboration with unit staff and hospice care met with the patient and mom to discuss overall goals of care. Patient and family has been provided with a clear medical update and understand patient's very poor prognosis. At this time patient and mom are opting for complete comfort care.

## 2020-01-30 NOTE — PROGRESS NOTE ADULT - ASSESSMENT
59yMale being evaluated for pain management under Hospice care.      Recommendations:  cont morphine gtt @ 10mg/hr  morphine 10mg IVP q2h PRN breakthrough pain  increase ativan: 1mg IV q4h PRN anxiety  daily bowel regimen      ongoing Hospice management

## 2020-01-30 NOTE — CHART NOTE - NSCHARTNOTEFT_GEN_A_CORE
Palliative Care Biopsychosocial Assessment:    Patient is a 60 y/o male; A&Ox4 and able to direct self care. Patient is chronically ill.  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 Patient resides with his mom in a private home. Currently, patient is receiving end of life care. Patient and his mom are aware of his very poor prognosis. Patients mom is having a very difficult time coping. Patient's mom lost her spouse(patient's father) approximately a year ago, she has also lost a very dear friend of hers a few months ago. Patient's mom verbalized financial and emotional hardships. Plan to continue providing support and assisting as necessary.    Patient Coping Status:       [ x  ]   coping well        [   ]    coping with  some difficulty       [   ]   difficulty coping     [   ]   other                                                       Patient Emotional Status:     [   ]   anxious         [   ]   depressed           [   ]  overwhelmed          [   ]   angry         [  x ]accepting       [   ]   not accepting           [   ]   other     Patient Mental Status:      [   x]   alert              [ x  ]   oriented         [    ]   confused         [   ] lethargic         [   ]   non-responsive   [   ]   other     Advance Directives:     [   ]    Health Care Surrogate: Name:                             [   ]    Health Care Proxy: Name:   [  x ]    MOLST  [   ]    Living Will  [ x  ]    DNR  [  x ]    DNI    Patient Needs:     [x   ]   Supportive Counseling                  [   ]   Family Meeting            [   ]    Education                           [   ]   Advance Care Planning         Caregiver Name: Bianka Booker  Caregiver needs:     [  x ]   Supportive Counseling      [   ]   Family Conference      [  x ]   Education    [   ]   Other     Referral:      [x   ]   Community Resources         [   ]   Cancer Support Group     [   ]    Hospice       [  x ]   Bereavement support     [   ]   Pastoral Care      [   ]  [facility] [place]       [   ]  Child Life Services     [   ]   Other                    Spectra #: [phone]

## 2020-01-30 NOTE — CHART NOTE - NSCHARTNOTEFT_GEN_A_CORE
Registered Dietitian Limited Assessment:  -Pt well known to nutrition department for h/o multiple medical comorbidities and multiple pressure ulcers. At this time, pt and family opting for complete comfort measures only, no feeding tubes/no hemodialysis/no IV antibiotics blood draws.  Pt being followed by inpatient hospice and palliative care. Therefore, no aggressive nutrition interventions at this time. Comfort feeds only. Family bringing food from home at pt request as well. RD sign off.

## 2020-01-30 NOTE — PROGRESS NOTE ADULT - ASSESSMENT
Pt admitted for in patient Hospice   - continue with recommendations made by Hospice  - c/w morphine drip  - morphine prn for break through pain  - comfort care measures.

## 2020-01-30 NOTE — PROGRESS NOTE ADULT - ASSESSMENT
60 yo M with hx of PAD s/p R AKA, L BKA, Paraplegia with multiple sacral ulcers (stage 4) on suprapubic catheter, HTN, CKD stage IV, Hypothyroidism Opioid dependence readmitted to hospice service. Patient was recently admitted to [facility] for  severe sepsis secondary to MRSA bacteremia secondary to sacral OM. Patient was also found to have worsening CKD stage IV. Patient was evaluated by Nephro and had discussion regarding possible dialysis in the near future. However, patient refused HD as well as IV abx for MRSA bacteremia from sacral OM. Patient explicitly reports that he wants comfort care and would like to be discharged to [facility]. Patient was seen by Hospice and Hospice recommended patient to be discharged from medicine and readmitted to Hospice for management of uncontrolled pain until patient has bed ready at [facility].   * Patient is on HOSPICE service * hospice nurses are available 24 hours. any question please call numbers on manual chart in rack    Uncontrolled pain likely 2/2 stage 4 sacral ulcers   - patient is on morphine gtt 2ml/hour and morphine 4mg every 30 min PRN  - c/w bowel regimen   - soap and water qd, santyl ointment and dakin solution wet to dry dressing , ABD pad dressing change qd as per Burn recs.   - follow up Hospice   - Palliative is also following recommended  - cont morphine gtt @10mg/hr - will continue to titrate as needed  - may administer morphine 10mg IVP q2h PRN breakthrough pain   - ativan 0.5mg IV q6h PRN anxiety/agitation    MRSA bacteremia likely from Sacral OM   - on Doxy 100mg bid  - Pt refused IV abx and wants comfort measures with PO abx.     Worsening CKD stage 4  - patient refused HD  - patient refused labs   - patient is on Hospice.     Hypoglycemia  - likely 2/2 malnutrition and methadone induced  - patient off methadone already.   - monitor FS and IVF D5    PAD s/p right side AKA and left below knee amputation  - incision sites look clean  - Removal of staples from LLE stump on 1/19    Hx esophageal candidiasis  - Completed 14 days of fluconazole course,   - outpt GI fu      DVT PPX: Heparin SC  GI PPx: not indicated  Diet: K restricted  Activity: OOBTC  Dispo: pending [facility]  CODE: DNR/DNI/no feeding tubes/no hemodialysis/no IV antibiotics blood draws/ Comfort measures only

## 2020-01-31 NOTE — PROGRESS NOTE ADULT - ASSESSMENT
58 yo M with hx of PAD s/p R AKA, L BKA, Paraplegia with multiple sacral ulcers (stage 4) on suprapubic catheter, HTN, CKD stage IV, Hypothyroidism Opioid dependence readmitted to hospice service. Patient was recently admitted to [facility] for  severe sepsis secondary to MRSA bacteremia secondary to sacral OM. Patient was also found to have worsening CKD stage IV. Patient was evaluated by Nephro and had discussion regarding possible dialysis in the near future. However, patient refused HD as well as IV abx for MRSA bacteremia from sacral OM. Patient explicitly reports that he wants comfort care and would like to be discharged to [facility]. Patient was seen by Hospice and Hospice recommended patient to be discharged from medicine and readmitted to Hospice for management of uncontrolled pain until patient has bed ready at [facility].   * Patient is on HOSPICE service * hospice nurses are available 24 hours. any question please call numbers on manual chart in rack    Uncontrolled pain likely 2/2 stage 4 sacral ulcers   - patient is on morphine gtt 2ml/hour and morphine 4mg every 30 min PRN  - c/w bowel regimen   - soap and water qd, santyl ointment and dakin solution wet to dry dressing , ABD pad dressing change qd as per Burn recs.   - follow up Hospice   - Palliative is also following recommended  - cont morphine gtt @10mg/hr - will continue to titrate as needed  - may administer morphine 10mg IVP q2h PRN breakthrough pain   - ativan 0.5mg IV q6h PRN anxiety/agitation + 1mg standing    MRSA bacteremia likely from Sacral OM   - on Doxy 100mg bid  - Pt refused IV abx and wants comfort measures with PO abx.     Worsening CKD stage 4  - patient refused HD  - patient refused labs   - patient is on Hospice.     Hypoglycemia  - likely 2/2 malnutrition and methadone induced  - patient off methadone already.   - monitor FS and IVF D5    PAD s/p right side AKA and left below knee amputation  - incision sites look clean  - Removal of staples from LLE stump on 1/19    Hx esophageal candidiasis  - Completed 14 days of fluconazole course,   - outpt GI fu      DVT PPX: Heparin SC  GI PPx: not indicated  Diet: K restricted  Activity: OOBTC  Dispo: pending [facility]  CODE: DNR/DNI/no feeding tubes/no hemodialysis/no IV antibiotics blood draws/ Comfort measures only

## 2020-01-31 NOTE — PROGRESS NOTE ADULT - ASSESSMENT
59yMale being evaluated for pain/symptom management.     PT UNDER HOSPICE CARE.    Recommendations:  cont morphine gtt @10mg / hr with 10mg IVP for breakthrough pain  change ativan: 1mg IV q6h ATC  cont ativan 1mg IV q4h PRN anxiety  daily bowel regimen    ongoing Hospice mngmnt    Please call Hospice with any questions/concerns.  We will follow

## 2020-02-01 NOTE — PROGRESS NOTE ADULT - ASSESSMENT
58 yo M PMHx PAD s/p R AKA, L BKA, Paraplegia with multiple sacral ulcers (stage 4) on suprapubic catheter, HTN, CKD stage IV, Hypothyroidism Opioid dependence, recent admission for MRSA bacteremia due to sacral OM, worsening CKD-would require HD-pt declined- now readmitted to hospice service    * Patient is on HOSPICE service * hospice nurses are available 24 hours. any question please call numbers on manual chart in rack    Uncontrolled pain likely 2/2 stage 4 sacral ulcers   - c/w morphine ggt, IV for breakthrough, ativan    MRSA bacteremia likely from Sacral OM   - hospice  - doxycycline declined    Worsening CKD stage 4  - patient refused HD      Comfort measures only    #Progress Note Handoff:  Pending (specify):  [facility]  Family discussion: discussed pain control with pt and mom   Disposition: Home___/SNF___/Other____[facility]____/Unknown at this time________

## 2020-02-01 NOTE — PROGRESS NOTE ADULT - ASSESSMENT
60 yo M with hx of PAD s/p R AKA, L BKA, Paraplegia with multiple sacral ulcers (stage 4) on suprapubic catheter, HTN, CKD stage IV, Hypothyroidism Opioid dependence readmitted to hospice service. Patient was recently admitted to [facility] for  severe sepsis secondary to MRSA bacteremia secondary to sacral OM. Patient was also found to have worsening CKD stage IV. Patient was evaluated by Nephro and had discussion regarding possible dialysis in the near future. However, patient refused HD as well as IV abx for MRSA bacteremia from sacral OM. Patient explicitly reports that he wants comfort care and would like to be discharged to [facility]. Patient was seen by Hospice and Hospice recommended patient to be discharged from medicine and readmitted to Hospice for management of uncontrolled pain until patient has bed ready at [facility].   * Patient is on HOSPICE service * hospice nurses are available 24 hours. any question please call numbers on manual chart in rack    Uncontrolled pain likely 2/2 stage 4 sacral ulcers   - patient is on morphine gtt 2ml/hour and morphine 4mg every 30 min PRN  - c/w bowel regimen   - soap and water qd, santyl ointment and dakin solution wet to dry dressing , ABD pad dressing change qd as per Burn recs.   - follow up Hospice   - Palliative is also following, recommended  - cont morphine gtt @10mg/hr - will continue to titrate as needed  - may administer morphine 10mg IVP q2h PRN breakthrough pain   - Ativan 1mg IV standing + 0.5mg IV q6h PRN anxiety/agitation    MRSA bacteremia likely from Sacral OM   - on Doxy 100mg bid  - Pt refused IV abx and wants comfort measures with PO abx.     Worsening CKD stage 4  - patient refused HD  - patient refused labs   - patient is on Hospice.     Hypoglycemia  - likely 2/2 malnutrition and methadone induced  - patient off methadone already.   - monitor FS and IVF D5    PAD s/p right side AKA and left below knee amputation  - incision sites look clean  - Removal of staples from LLE stump on 1/19    Hx esophageal candidiasis  - Completed 14 days of fluconazole course,   - outpt GI fu      DVT PPX: Heparin SC  GI PPx: not indicated  Diet: K restricted  Activity: OOBTC  Dispo: pending [facility]  CODE: DNR/DNI/no feeding tubes/no hemodialysis/no IV antibiotics blood draws/ Comfort measures only

## 2020-02-02 NOTE — PROGRESS NOTE ADULT - ASSESSMENT
58 yo M PMHx PAD s/p R AKA, L BKA, Paraplegia with multiple sacral ulcers (stage 4) on suprapubic catheter, HTN, CKD stage IV, Hypothyroidism Opioid dependence, recent admission for MRSA bacteremia due to sacral OM, worsening CKD-would require HD-pt declined- now readmitted to hospice service    * Patient is on HOSPICE service *     Uncontrolled pain likely 2/2 stage 4 sacral ulcers   - c/w morphine ggt, IV for breakthrough, ativan    Leaking suprapubic catheter  - pt changes his catheter every couple of weeks, states it hasn't been changed in some time  - uses 22 Fr  - d/w urology, they will come to evaluate patient    MRSA bacteremia likely from Sacral OM   - hospice  - doxycycline declined    Worsening CKD stage 4  - patient refused HD      Comfort measures only    #Progress Note Handoff:  Pending (specify):  n/a inpt hospice  Family discussion: discussed catheter with pt and mom  Disposition: Home___/SNF___/Other________/Unknown at this time____x____

## 2020-02-02 NOTE — PROCEDURE NOTE - ADDITIONAL PROCEDURE DETAILS
20cc's of water removed from current SP tube and tube removed. Under sterile technique, a 24 fr brown catheter was placed into the SP tube site. Pt tolerated well. No urine output initially, catheter irrigated with 50 cc's of sterile water with + return of sterile water in tubing. RN aware to monitor urine output. Pt comfortable.

## 2020-02-02 NOTE — PROCEDURE NOTE - NSURITECHNIQUE_GU_A_CORE
The catheter was appropriately lubricated/The collection bag is below the level of the patient and urinary bladder/The urinary drainage system is closed at the end of the procedure/Proper hand hygiene was performed/Sterile gloves were worn for the duration of the procedure/A sterile drape was used to cover all adjacent areas/The catheter was secured with a securement device (e.g. StatLock)

## 2020-02-03 NOTE — PROGRESS NOTE ADULT - ASSESSMENT
58 yo M with hx of PAD s/p R AKA, L BKA, Paraplegia with multiple sacral ulcers (stage 4) on suprapubic catheter, HTN, CKD stage IV, Hypothyroidism Opioid dependence readmitted to hospice service. Patient was recently admitted to [facility] for  severe sepsis secondary to MRSA bacteremia secondary to sacral OM. Patient was also found to have worsening CKD stage IV. Patient was evaluated by Nephro and had discussion regarding possible dialysis in the near future. However, patient refused HD as well as IV abx for MRSA bacteremia from sacral OM. Patient explicitly reports that he wants comfort care and would like to be discharged to [facility]. Patient was seen by Hospice and Hospice recommended patient to be discharged from medicine and readmitted to Hospice for management of uncontrolled pain [facility]   * Patient is on HOSPICE service * hospice nurses are available 24 hours. any question please call numbers on manual chart in rack    Uncontrolled pain likely 2/2 stage 4 sacral ulcers   - patient is on morphine gtt 2ml/hour and morphine 4mg every 30 min PRN  - c/w bowel regimen   - soap and water qd, santyl ointment and dakin solution wet to dry dressing , ABD pad dressing change qd as per Burn recs.   - follow up Hospice   - Palliative is also following, recommended  - cont morphine gtt @14mg/hr - will continue to titrate as needed  - may administer morphine 10mg IVP q2h PRN breakthrough pain   - Ativan 1mg IV standing + 0.5mg IV q6h PRN anxiety/agitation    MRSA bacteremia likely from Sacral OM   - on Doxy 100mg bid  - Pt refused IV abx and wants comfort measures with PO abx.     Worsening CKD stage 4  - patient refused HD  - patient refused labs   - patient is on Hospice.     Hypoglycemia  - likely 2/2 malnutrition and methadone induced  - patient off methadone already.   - monitor FS and IVF D5    PAD s/p right side AKA and left below knee amputation  - incision sites look clean  - Removal of staples from LLE stump on 1/19    Hx esophageal candidiasis  - Completed 14 days of fluconazole course,   - outpt GI fu      DVT PPX: Heparin SC  GI PPx: not indicated  Diet: K restricted  Activity: OOBTC  Dispo: pending [facility]  CODE: DNR/DNI/no feeding tubes/no hemodialysis/no IV antibiotics blood draws/ Comfort measures only

## 2020-02-04 NOTE — PROGRESS NOTE ADULT - ASSESSMENT
58 yo M with hx of PAD s/p R AKA, L BKA, Paraplegia with multiple sacral ulcers (stage 4) on suprapubic catheter, HTN, CKD stage IV, Hypothyroidism Opioid dependence readmitted to hospice service. Patient was recently admitted to [facility] for  severe sepsis secondary to MRSA bacteremia secondary to sacral OM. Patient was also found to have worsening CKD stage IV. Patient was evaluated by Nephro and had discussion regarding possible dialysis in the near future. However, patient refused HD as well as IV abx for MRSA bacteremia from sacral OM. Patient explicitly reports that he wants comfort care and would like to be discharged to [facility]. Patient was seen by Hospice and Hospice recommended patient to be discharged from medicine and readmitted to Hospice for management of uncontrolled pain until patient has bed ready at [facility].   * Patient is on HOSPICE service * hospice nurses are available 24 hours. any question please call numbers on manual chart in rack    Uncontrolled pain likely 2/2 stage 4 sacral ulcers   - patient is on morphine gtt 2ml/hour and morphine 4mg every 30 min PRN  - c/w bowel regimen   - soap and water qd, santyl ointment and dakin solution wet to dry dressing , ABD pad dressing change qd as per Burn recs.   - follow up Hospice   - Palliative is also following, recommended  - Hospice RN Vero recommended the following regimen today (she can be contacted at ()  - cont morphine gtt @16mg/hr - will continue to titrate as needed  - may administer morphine 16mg IVP q2h PRN breakthrough pain   - Ativan 1mg IV standing + 1mg IV q4h PRN anxiety/agitation    MRSA bacteremia likely from Sacral OM   - was on Doxy 100mg bid as Pt refused IV antibiotics.   - Pt refused IV abx and wants comfort measures only afterwards    Worsening CKD stage 4  - patient refused HD  - patient refused labs   - patient is on Hospice.     Hypoglycemia  - likely 2/2 malnutrition and methadone induced  - patient off methadone already.   - dextrose 5% + sodium chloride 0.45%. 1000 milliLiter(s) IV Continuous  - as per ENDO no need of checking FS and Pt is on Hospice    PAD s/p right side AKA and left below knee amputation  - incision sites look clean  - Removal of staples from LLE stump on 1/19    Hx esophageal candidiasis  - Completed 14 days of fluconazole course,       DVT PPX: Heparin SC  GI PPx: not indicated  Diet: K restricted  Activity: OOBTC  Dispo: pending [facility]  CODE: DNR/DNI/no feeding tubes/no hemodialysis/no IV antibiotics blood draws/ Comfort measures only 60 yo M with hx of PAD s/p R AKA, L BKA, Paraplegia with multiple sacral ulcers (stage 4) on suprapubic catheter, HTN, CKD stage IV, Hypothyroidism Opioid dependence readmitted to hospice service. Patient was recently admitted to [facility] for  severe sepsis secondary to MRSA bacteremia secondary to sacral OM. Patient was also found to have worsening CKD stage IV. Patient was evaluated by Nephro and had discussion regarding possible dialysis in the near future. However, patient refused HD as well as IV abx for MRSA bacteremia from sacral OM. Patient explicitly reports that he wants comfort care and would like to be discharged to [facility]. Patient was seen by Hospice and Hospice recommended patient to be discharged from medicine and readmitted to Hospice for management of uncontrolled pain until patient has bed ready at [facility].   * Patient is on HOSPICE service * hospice nurses are available 24 hours. any question please call numbers on manual chart in rack    Uncontrolled pain likely 2/2 stage 4 sacral ulcers   - patient is on morphine gtt 2ml/hour and morphine 4mg every 30 min PRN  - c/w bowel regimen   - soap and water qd, santyl ointment and dakin solution wet to dry dressing , ABD pad dressing change qd as per Burn recs.   - follow up Hospice   - Palliative is also following, recommended  - Hospice RN Vero recommended the following regimen today (she can be contacted at ()  - cont morphine gtt @16mg/hr - will continue to titrate as needed  - may administer morphine 16mg IVP q2h PRN breakthrough pain   - was on Ativan 1mg IV standing + 1mg IV q4h PRN anxiety/agitation  - Called by Dr. Sheridan (Director of Hospice)  - Starting 100mg gabapentin tid  - starting 2mg Ativan q6 + 1mg q6 PRN    MRSA bacteremia likely from Sacral OM   - was on Doxy 100mg bid as Pt refused IV antibiotics.   - Pt refused IV abx and wants comfort measures only afterwards    Worsening CKD stage 4  - patient refused HD  - patient refused labs   - patient is on Hospice.     Hypoglycemia  - likely 2/2 malnutrition and methadone induced  - patient off methadone already.   - dextrose 5% + sodium chloride 0.45%. 1000 milliLiter(s) IV Continuous  - as per ENDO no need of checking FS and Pt is on Hospice    PAD s/p right side AKA and left below knee amputation  - incision sites look clean  - Removal of staples from LLE stump on 1/19    Hx esophageal candidiasis  - Completed 14 days of fluconazole course,       DVT PPX: Heparin SC  GI PPx: not indicated  Diet: K restricted  Activity: OOBTC  Dispo: pending [facility]  CODE: DNR/DNI/no feeding tubes/no hemodialysis/no IV antibiotics blood draws/ Comfort measures only

## 2020-02-04 NOTE — PHARMACOTHERAPY INTERVENTION NOTE - COMMENTS
spoke with DAVIDSON Badillo at [facility], [phone], confirmed morphine 16mg IVP q 2 h for breakthrough in conjunction with morphine gtt 16mg/hr.

## 2020-02-04 NOTE — PROGRESS NOTE ADULT - ASSESSMENT
59 years old male known to have PVD S/p B/l BKA, paraplegia with multiple chronic bilateral buttock and sacral stage 4 ulcers, suprapubic catheter, HTN, CKD stage IV , hypothyroidism, opioid dependance,  h/o esophageal candidiasis with recent diagnosis of MRSA bacteremia, Hypoglycemia sec to chronic malnutrition, worsening CKD now admitted for in patient Hospice   - continue with recommendations made by Hospice  - c/w morphine drip, morphine prn for break through pain  - c/w ativan q4  - comfort care measures.  - Discussed with patients family, about changes made for better control of pt's symptoms.

## 2020-02-05 NOTE — PROGRESS NOTE ADULT - ASSESSMENT
60 yo M with hx of PAD s/p R AKA, L BKA, Paraplegia with multiple sacral ulcers (stage 4) on suprapubic catheter, HTN, CKD stage IV, Hypothyroidism Opioid dependence readmitted to hospice service. Patient was recently admitted to [facility] for  severe sepsis secondary to MRSA bacteremia secondary to sacral OM. Patient was also found to have worsening CKD stage IV. Patient was evaluated by Nephro and had discussion regarding possible dialysis in the near future. However, patient refused HD as well as IV abx for MRSA bacteremia from sacral OM. Patient explicitly reports that he wants comfort care and would like to be discharged to [facility]. Patient was seen by Hospice and Hospice recommended patient to be discharged from medicine and readmitted to Hospice for management of uncontrolled pain until patient has bed ready at [facility].   * Patient is on HOSPICE service * hospice nurses are available 24 hours. any question please call numbers on manual chart in rack    Uncontrolled pain likely 2/2 stage 4 sacral ulcers   - patient is on morphine gtt 2ml/hour and morphine 4mg every 30 min PRN  - c/w bowel regimen   - soap and water qd, santyl ointment and dakin solution wet to dry dressing , ABD pad dressing change qd as per Burn recs.   - follow up Hospice   - Palliative is also following, recommended  - Hospice RN Vero recommended the following regimen today (she can be contacted at ()  - cont morphine gtt @16mg/hr - will continue to titrate as needed  - may administer morphine 16mg IVP q2h PRN breakthrough pain   - was on Ativan 1mg IV standing + 1mg IV q4h PRN anxiety/agitation  - Called by Dr. Sheridan (Director of Hospice) as patient was still complaining of pain and mother was calling hospice nurses. So he called me to increase ativan and make patient comfortable.  - Morphine is already increased from yesterday.  - Starting 100mg gabapentin tid as pain can be neuropathic  - Increasing Ativan from 1 mg to 2mg Ativan q6 + 1mg q6 PRN    MRSA bacteremia likely from Sacral OM   - was on Doxy 100mg bid as Pt refused IV antibiotics.   - Pt refused IV abx and wants comfort measures only afterwards    Worsening CKD stage 4  - patient refused HD  - patient refused labs   - patient is on Hospice.     Hypoglycemia  - likely 2/2 malnutrition and methadone induced  - patient off methadone already.   - dextrose 5% + sodium chloride 0.45%. 1000 milliLiter(s) IV Continuous  - as per ENDO no need of checking FS and Pt is on Hospice    PAD s/p right side AKA and left below knee amputation  - incision sites look clean  - Removal of staples from LLE stump on 1/19    Hx esophageal candidiasis  - Completed 14 days of fluconazole course,       DVT PPX: Heparin SC  GI PPx: not indicated  Diet: K restricted  Activity: OOBTC  Dispo: pending [facility]  CODE: DNR/DNI/no feeding tubes/no hemodialysis/no IV antibiotics blood draws/ Comfort measures only

## 2020-02-05 NOTE — PROGRESS NOTE ADULT - ASSESSMENT
59 years old male known to have PVD S/p B/l BKA, paraplegia with multiple chronic bilateral buttock and sacral stage 4 ulcers, suprapubic catheter, HTN, CKD stage IV , hypothyroidism, opioid dependance,  h/o esophageal candidiasis with recent diagnosis of MRSA bacteremia, Hypoglycemia sec to chronic malnutrition, worsening CKD now admitted for in patient Hospice   - continue with recommendations made by Hospice  - c/w morphine drip, morphine prn for break through pain  - c/w ativan q4  - comfort care measures.

## 2020-02-06 NOTE — PROGRESS NOTE ADULT - ASSESSMENT
59 years old male known to have PVD S/p B/l BKA, paraplegia with multiple chronic bilateral buttock and sacral stage 4 ulcers, suprapubic catheter, HTN, CKD stage IV , hypothyroidism, opioid dependance,  h/o esophageal candidiasis with recent diagnosis of MRSA bacteremia, Hypoglycemia sec to chronic malnutrition, worsening CKD now admitted for in patient Hospice   - continue with recommendations made by Hospice  - c/w morphine drip, morphine prn for break through pain  - c/w ativan q4  - c/w haldol prn  - comfort care measures.

## 2020-02-06 NOTE — PROGRESS NOTE ADULT - ASSESSMENT
60 yo M with hx of PAD s/p R AKA, L BKA, Paraplegia with multiple sacral ulcers (stage 4) on suprapubic catheter, HTN, CKD stage IV, Hypothyroidism Opioid dependence readmitted to hospice service. Patient was recently admitted to [facility] for  severe sepsis secondary to MRSA bacteremia secondary to sacral OM. Patient was also found to have worsening CKD stage IV. Patient was evaluated by Nephro and had discussion regarding possible dialysis in the near future. However, patient refused HD as well as IV abx for MRSA bacteremia from sacral OM. Patient explicitly reports that he wants comfort care and would like to be discharged to [facility]. Patient was seen by Hospice and Hospice recommended patient to be discharged from medicine and readmitted to Hospice for management of uncontrolled pain until patient has bed ready at [facility].   * Patient is on HOSPICE service * hospice nurses are available 24 hours. any question please call numbers on manual chart in rack    Uncontrolled pain likely 2/2 stage 4 sacral ulcers   - patient is on morphine gtt 2ml/hour and morphine 4mg every 30 min PRN  - c/w bowel regimen   - soap and water qd, santyl ointment and dakin solution wet to dry dressing , ABD pad dressing change qd as per Burn recs.   - follow up Hospice   - Palliative is also following, recommended  - Hospice RN Vero recommended the following regimen today (she can be contacted at ()  - cont morphine gtt @16mg/hr - will continue to titrate as needed  - may administer morphine 16mg IVP q2h PRN breakthrough pain   - was on Ativan 1mg IV standing + 1mg IV q4h PRN anxiety/agitation  - Called by Dr. Sheridan (Director of Hospice) as patient was still complaining of pain and mother was calling hospice nurses. So he called me to increase ativan and make patient comfortable.  - Morphine is already increased from yesterday.  - Starting 100mg gabapentin tid as pain can be neuropathic  - Increasing Ativan from 1 mg to 2mg Ativan q6 + 1mg q6 PRN    MRSA bacteremia likely from Sacral OM   - was on Doxy 100mg bid as Pt refused IV antibiotics.   - Pt refused IV abx and wants comfort measures only afterwards    Worsening CKD stage 4  - patient refused HD  - patient refused labs   - patient is on Hospice.     Hypoglycemia  - likely 2/2 malnutrition and methadone induced  - patient off methadone already.   - dextrose 5% + sodium chloride 0.45%. 1000 milliLiter(s) IV Continuous  - as per ENDO no need of checking FS and Pt is on Hospice    PAD s/p right side AKA and left below knee amputation  - incision sites look clean  - Removal of staples from LLE stump on 1/19    Hx esophageal candidiasis  - Completed 14 days of fluconazole course,       DVT PPX: Heparin SC  GI PPx: not indicated  Diet: K restricted  Activity: OOBTC  Dispo: pending [facility]  CODE: DNR/DNI/no feeding tubes/no hemodialysis/no IV antibiotics blood draws/ Comfort measures only

## 2020-02-07 NOTE — PROGRESS NOTE ADULT - ASSESSMENT
58 yo M with hx of PAD s/p R AKA, L BKA, Paraplegia with multiple sacral ulcers (stage 4) on suprapubic catheter, HTN, CKD stage IV, Hypothyroidism Opioid dependence readmitted to hospice service. Patient was recently admitted to [facility] for  severe sepsis secondary to MRSA bacteremia secondary to sacral OM. Patient was also found to have worsening CKD stage IV. Patient was evaluated by Nephro and had discussion regarding possible dialysis in the near future. However, patient refused HD as well as IV abx for MRSA bacteremia from sacral OM. Patient explicitly reports that he wants comfort care and would like to be discharged to [facility]. Patient was seen by Hospice and Hospice recommended patient to be discharged from medicine and readmitted to Hospice for management of uncontrolled pain until patient has bed ready at [facility].   * Patient is on HOSPICE service * hospice nurses are available 24 hours. any question please call numbers on manual chart in rack    Uncontrolled pain likely 2/2 stage 4 sacral ulcers   - patient is on morphine gtt 2ml/hour and morphine 4mg every 30 min PRN  - c/w bowel regimen   - soap and water qd, santyl ointment and dakin solution wet to dry dressing , ABD pad dressing change qd as per Burn recs.   - follow up Hospice   - Palliative is also following, recommended  - Hospice RN Vero recommended the following regimen today (she can be contacted at ()  - cont morphine gtt @16mg/hr - will continue to titrate as needed  - may administer morphine 18mg IVP q2h PRN breakthrough pain   - was on Ativan 1mg IV standing + 1mg IV q4h PRN anxiety/agitation  - Called by Dr. Sheridan (Director of Hospice) as patient was still complaining of pain and mother was calling hospice nurses. So he called me to increase ativan and make patient comfortable.  - Morphine is already increased from yesterday.  - Starting 100mg gabapentin tid as pain can be neuropathic  - Increasing Ativan from 1 mg to 2mg Ativan 46 + 1mg q6 PRN  - As per Hospice - ordering haldol 2mg for night time agitation     MRSA bacteremia likely from Sacral OM   - was on Doxy 100mg bid as Pt refused IV antibiotics.   - Pt refused IV abx and wants comfort measures only afterwards    Worsening CKD stage 4  - patient refused HD  - patient refused labs   - patient is on Hospice.     Hypoglycemia  - likely 2/2 malnutrition and methadone induced  - patient off methadone already.   - dextrose 5% + sodium chloride 0.45%. 1000 milliLiter(s) IV Continuous  - as per ENDO no need of checking FS and Pt is on Hospice    PAD s/p right side AKA and left below knee amputation  - incision sites look clean  - Removal of staples from LLE stump on 1/19    Hx esophageal candidiasis  - Completed 14 days of fluconazole course,       DVT PPX: Heparin SC  GI PPx: not indicated  Diet: K restricted  Activity: OOBTC  Dispo: pending [facility]  CODE: DNR/DNI/no feeding tubes/no hemodialysis/no IV antibiotics blood draws/ Comfort measures only

## 2020-02-08 NOTE — PROGRESS NOTE ADULT - REASON FOR ADMISSION
Symptom mgt at end of life
lower ext. pain
Hospice
Hospice services
Readmitted for Hospice
Readmitted for Hospice
Transfer to Hospice Service
readmitted to Hospice service

## 2020-02-08 NOTE — PROGRESS NOTE ADULT - ASSESSMENT
58 yo M with hx of PAD s/p R AKA, L BKA, Paraplegia with multiple sacral ulcers (stage 4) on suprapubic catheter, HTN, CKD stage IV, Hypothyroidism Opioid dependence readmitted to hospice service. Patient was recently admitted to [facility] for  severe sepsis secondary to MRSA bacteremia secondary to sacral OM. Patient was also found to have worsening CKD stage IV. Patient was evaluated by Nephro and had discussion regarding possible dialysis in the near future. However, patient refused HD as well as IV abx for MRSA bacteremia from sacral OM. Patient explicitly reports that he wants comfort care and would like to be discharged to [facility]. Patient was seen by Hospice and Hospice recommended patient to be discharged from medicine and readmitted to Hospice for management of uncontrolled pain until patient has bed ready at [facility].   * Patient is on HOSPICE service * hospice nurses are available 24 hours. any question please call numbers on manual chart in rack    Uncontrolled pain likely 2/2 stage 4 sacral ulcers   - patient is on morphine gtt 2ml/hour and morphine 4mg every 30 min PRN  - c/w bowel regimen   - soap and water qd, santyl ointment and dakin solution wet to dry dressing , ABD pad dressing change qd as per Burn recs.   - follow up Hospice   - Hospice RN Vero recommended the following regimen today (she can be contacted at ()  - cont morphine gtt @16mg/hr - will continue to titrate as needed  - may administer morphine 18mg IVP q2h PRN breakthrough pain   - was on Ativan 1mg IV standing + 1mg IV q4h PRN anxiety/agitation  - Called by Dr. Sheridan (Director of Hospice) as patient was still complaining of pain and mother was calling hospice nurses. So he called me to increase ativan and make patient comfortable.  - Morphine is already increased.  - Starting 100mg gabapentin tid as pain can be neuropathic  - Increasing Ativan from 1 mg to 2mg Ativan q4+ 1mg q6 PRN  - As per Hospice - ordering haldol 2mg for night time agitation     MRSA bacteremia likely from Sacral OM   - was on Doxy 100mg bid as Pt refused IV antibiotics.   - Pt refused IV abx and wants comfort measures only afterwards    Worsening CKD stage 4  - patient refused HD  - patient refused labs   - patient is on Hospice.     Hypoglycemia  - likely 2/2 malnutrition and methadone induced  - patient off methadone already.   - dextrose 5% + sodium chloride 0.45%. 1000 milliLiter(s) IV Continuous  - as per ENDO no need of checking FS and Pt is on Hospice    PAD s/p right side AKA and left below knee amputation  - incision sites look clean  - Removal of staples from LLE stump on 1/19    Hx esophageal candidiasis  - Completed 14 days of fluconazole course,       DVT PPX: Heparin SC  GI PPx: not indicated  Diet: K restricted  Activity: OOBTC  Dispo: pending [facility]  CODE: DNR/DNI/no feeding tubes/no hemodialysis/no IV antibiotics blood draws/ Comfort measures only

## 2020-02-09 NOTE — PROGRESS NOTE ADULT - ASSESSMENT
59 years old male known to have PVD S/p B/l BKA, paraplegia with multiple chronic bilateral buttock and sacral stage 4 ulcers, suprapubic catheter, HTN, CKD stage IV , hypothyroidism, opioid dependance,  h/o esophageal candidiasis with recent diagnosis of MRSA bacteremia, Hypoglycemia sec to chronic malnutrition, worsening CKD now admitted for in patient Hospice   - continue with recommendations made by Hospice  - c/w morphine drip, morphine prn for break through pain  - c/w ativan q4  - c/w haldol prn  - decrease IV fluids to 50ml/hr  - comfort care measures.

## 2020-02-10 NOTE — PROGRESS NOTE ADULT - PROVIDER SPECIALTY LIST ADULT
Hospice
Hospitalist
Internal Medicine
Pain Medicine
Palliative Care
Palliative Care
Hospitalist

## 2020-02-10 NOTE — PROGRESS NOTE ADULT - ASSESSMENT
59 years old male known to have PVD S/p B/l BKA, paraplegia with multiple chronic bilateral buttock and sacral stage 4 ulcers, suprapubic catheter, HTN, CKD stage IV , hypothyroidism, opioid dependance,  h/o esophageal candidiasis with recent diagnosis of MRSA bacteremia, Hypoglycemia sec to chronic malnutrition, worsening CKD now admitted for in patient Hospice   - continue with recommendations made by Hospice  - c/w morphine drip, morphine prn for break through pain  - c/w ativan q4  - c/w haldol prn  - decrease IV fluids to 25ml/hr  - comfort care measures.

## 2020-02-10 NOTE — PROGRESS NOTE ADULT - NSHPATTENDINGPLANDISCUSS_GEN_ALL_CORE
Housestaff
Housestaff and nursing

## 2020-02-10 NOTE — PROGRESS NOTE ADULT - SUBJECTIVE AND OBJECTIVE BOX
59yMale with diagnosis: CHRONIC KIDNEY DISEASE      Patient seen, chart reviewed, case d/w Hospice RN.  Pain improved w current regimen.      PHYSICAL EXAM  unchanged    T(C): --  T(F): --  HR: --  BP: --  RR: --  SpO2: --              LABS:                                                                                                                                               MEDICATIONS  (STANDING):  amitriptyline 25 milliGRAM(s) Oral at bedtime  collagenase Ointment 1 Application(s) Topical two times a day  Dakins Solution - 1/2 Strength 1 Application(s) Topical two times a day  dextrose 5% + sodium chloride 0.45%. 1000 milliLiter(s) (75 mL/Hr) IV Continuous <Continuous>  dronabinol 2.5 milliGRAM(s) Oral every 12 hours  levothyroxine 100 MICROGram(s) Oral daily  morphine  Infusion 10 mG/Hr (10 mL/Hr) IV Continuous <Continuous>  oxybutynin 10 milliGRAM(s) Oral two times a day  polyethylene glycol 3350 17 Gram(s) Oral at bedtime  triamcinolone 0.1% Oral Paste 1 Application(s) Topical two times a day    MEDICATIONS  (PRN):  LORazepam   Injectable 1 milliGRAM(s) IV Push every 4 hours PRN Agitation  morphine  - Injectable 10 milliGRAM(s) IV Push every 2 hours PRN Severe Pain (7 - 10)
59yMale with diagnosis: CHRONIC KIDNEY DISEASE      Patient seen, for follow up.  Pain controlled with current regimen.       PHYSICAL EXAM  unchanged    T(C): --  T(F): --  HR: --  BP: --  RR: --  SpO2: --              LABS:                                                                                                                                               MEDICATIONS  (STANDING):  amitriptyline 25 milliGRAM(s) Oral at bedtime  collagenase Ointment 1 Application(s) Topical two times a day  Dakins Solution - 1/2 Strength 1 Application(s) Topical two times a day  dextrose 5% + sodium chloride 0.45%. 1000 milliLiter(s) (75 mL/Hr) IV Continuous <Continuous>  dronabinol 2.5 milliGRAM(s) Oral every 12 hours  levothyroxine 100 MICROGram(s) Oral daily  LORazepam   Injectable 1 milliGRAM(s) IV Push every 6 hours  morphine  Infusion 10 mG/Hr (10 mL/Hr) IV Continuous <Continuous>  oxybutynin 10 milliGRAM(s) Oral two times a day  polyethylene glycol 3350 17 Gram(s) Oral at bedtime  triamcinolone 0.1% Oral Paste 1 Application(s) Topical two times a day    MEDICATIONS  (PRN):  LORazepam   Injectable 1 milliGRAM(s) IV Push every 4 hours PRN Agitation  morphine  - Injectable 10 milliGRAM(s) IV Push every 2 hours PRN Severe Pain (7 - 10)
CHIEF COMPLAINT:    Patient is a 59y old  Male who presents with a chief complaint of Readmitted for Hospice     INTERVAL HPI/OVERNIGHT EVENTS:    Patient seen and examined at bedside. No acute overnight events occurred.    ROS: Reports some pain, especially when eating. All other systems are negative.    Vital Signs:    T(F): --  HR: --  BP: --  RR: --  SpO2: --  I&O's Summary    31 Jan 2020 07:01  -  01 Feb 2020 07:00  --------------------------------------------------------  IN: 1700 mL / OUT: 1800 mL / NET: -100 mL      Daily     Daily   CAPILLARY BLOOD GLUCOSE          PHYSICAL EXAM:  Deferred    LABS:      RADIOLOGY & ADDITIONAL TESTS:  No new images    Medications:  Standing  amitriptyline 25 milliGRAM(s) Oral at bedtime  collagenase Ointment 1 Application(s) Topical two times a day  Dakins Solution - 1/2 Strength 1 Application(s) Topical two times a day  dextrose 5% + sodium chloride 0.45%. 1000 milliLiter(s) IV Continuous <Continuous>  dronabinol 2.5 milliGRAM(s) Oral every 12 hours  levothyroxine 100 MICROGram(s) Oral daily  LORazepam   Injectable 1 milliGRAM(s) IV Push every 6 hours  morphine  Infusion 10 mG/Hr IV Continuous <Continuous>  oxybutynin 10 milliGRAM(s) Oral two times a day  polyethylene glycol 3350 17 Gram(s) Oral at bedtime  triamcinolone 0.1% Oral Paste 1 Application(s) Topical two times a day    PRN Meds  LORazepam   Injectable 1 milliGRAM(s) IV Push every 4 hours PRN  morphine  - Injectable 10 milliGRAM(s) IV Push every 2 hours PRN      Case discussed with resident  Care discussed with pt
CHIEF COMPLAINT:    Patient is a 59y old  Male who presents with a chief complaint of Readmitted for Hospice (01 Feb 2020 08:47)      INTERVAL HPI/OVERNIGHT EVENTS:    Patient seen and examined at bedside. No acute overnight events occurred.    ROS: All other systems are negative.    Vital Signs:    T(F): --  HR: --  BP: --  RR: --  SpO2: --  I&O's Summary    01 Feb 2020 07:01  -  02 Feb 2020 07:00  --------------------------------------------------------  IN: 0 mL / OUT: 1600 mL / NET: -1600 mL    02 Feb 2020 07:01  -  02 Feb 2020 11:51  --------------------------------------------------------  IN: 0 mL / OUT: 475 mL / NET: -475 mL      PHYSICAL EXAM:  GENERAL:  NAD  SKIN: No rashes or lesions  HEENT: Atraumatic. Normocephalic. Anicteric  NECK:  No JVD.   PULMONARY: Clear to ausculation bilaterally. No wheezing. No rales  CVS: Normal S1, S2. Regular rate and rhythm. No murmurs.  ABDOMEN/GI: Soft, Nontender, Nondistended; Bowel sounds are present  EXTREMITIES:  No edema B/L LE.  NEUROLOGIC:  No motor deficit.  PSYCH: Alert & oriented x 3, normal affect  : urine leaking from SPT site      Care Discussed with Consultants/Other Providers [ x] YES  [ ] NO-urology    LABS:    No new labs    RADIOLOGY & ADDITIONAL TESTS:  No new images    Medications:  Standing  amitriptyline 25 milliGRAM(s) Oral at bedtime  collagenase Ointment 1 Application(s) Topical two times a day  Dakins Solution - 1/2 Strength 1 Application(s) Topical two times a day  dextrose 5% + sodium chloride 0.45%. 1000 milliLiter(s) IV Continuous <Continuous>  dronabinol 2.5 milliGRAM(s) Oral every 12 hours  levothyroxine 100 MICROGram(s) Oral daily  LORazepam   Injectable 1 milliGRAM(s) IV Push every 6 hours  morphine  Infusion 10 mG/Hr IV Continuous <Continuous>  oxybutynin 10 milliGRAM(s) Oral two times a day  polyethylene glycol 3350 17 Gram(s) Oral at bedtime  triamcinolone 0.1% Oral Paste 1 Application(s) Topical two times a day    PRN Meds  LORazepam   Injectable 1 milliGRAM(s) IV Push every 4 hours PRN  morphine  - Injectable 10 milliGRAM(s) IV Push every 2 hours PRN      Case discussed with resident  Care discussed with pt
Case discussed with Palliative Care, they will continue provide pain management for the patient.
Hospice NP note:  Pt is a 59 year old male pt on hospice general inpatient level of care for acute symptom mgt, with PMH of PAD, Right AKA Left BKA Paraplegia with multiple stg 4 deucbiti, CKD4, HTN, hypothyroidism, sepsis.  Pt resting comfortably with family at bed side.  Spoke to Aunt who expressed gratetude that pt was more comfortable today than yesterday.  Pts mother asleep at bedside.  Pt in and out of sleep, acknowledged feeling less pain at this time.    Plan: Continue current medication regimen.
LENGTH OF HOSPITAL STAY: 10d    CHIEF COMPLAINT:   Patient is a 59y old  Male who presents with a chief complaint of Hospice (06 Feb 2020 09:04)      Overnight events:    No acute events overnight    ALLERGIES:  sulfamethizole (Unknown)    MEDICATIONS:  STANDING MEDICATIONS  amitriptyline 25 milliGRAM(s) Oral at bedtime  collagenase Ointment 1 Application(s) Topical two times a day  Dakins Solution - 1/2 Strength 1 Application(s) Topical two times a day  dextrose 5% + sodium chloride 0.45%. 1000 milliLiter(s) IV Continuous <Continuous>  gabapentin 100 milliGRAM(s) Oral three times a day  haloperidol    Injectable 2 milliGRAM(s) IV Push <User Schedule>  levothyroxine 100 MICROGram(s) Oral daily  LORazepam   Injectable 2 milliGRAM(s) IV Push every 4 hours  morphine  Infusion 18 mG/Hr IV Continuous <Continuous>  oxybutynin 10 milliGRAM(s) Oral two times a day  polyethylene glycol 3350 17 Gram(s) Oral at bedtime  scopolamine   Patch 1 Patch Transdermal every 72 hours  triamcinolone 0.1% Oral Paste 1 Application(s) Topical two times a day    PRN MEDICATIONS  LORazepam   Injectable 1 milliGRAM(s) IV Push every 6 hours PRN  morphine  - Injectable 18 milliGRAM(s) IV Push every 2 hours PRN    VITALS:   T(F): --  HR: --  BP: --  RR: --  SpO2: --    LABS:                          Cultures:      RADIOLOGY:    PHYSICAL EXAM:  HEENT: paler, rainer  LUNGS: Clear to auscultation bilaterally   HEART: S1/S2 present. RRR.   ABD: Soft, non-tender, non-distended. Bowel sounds present, Supra pubic catheter +  EXT: R AKA, Left BKA  NEURO: AAOX1
LENGTH OF HOSPITAL STAY: 11d    CHIEF COMPLAINT:   Patient is a 59y old  Male who presents with a chief complaint of Symptom mgt at end of life (07 Feb 2020 11:14)      Overnight events:    No acute events overnight    ALLERGIES:  sulfamethizole (Unknown)    MEDICATIONS:  STANDING MEDICATIONS  amitriptyline 25 milliGRAM(s) Oral at bedtime  collagenase Ointment 1 Application(s) Topical two times a day  Dakins Solution - 1/2 Strength 1 Application(s) Topical two times a day  dextrose 5% + sodium chloride 0.45%. 1000 milliLiter(s) IV Continuous <Continuous>  gabapentin 100 milliGRAM(s) Oral three times a day  haloperidol    Injectable 2 milliGRAM(s) IV Push <User Schedule>  levothyroxine 100 MICROGram(s) Oral daily  LORazepam   Injectable 2 milliGRAM(s) IV Push every 4 hours  morphine  Infusion 18 mG/Hr IV Continuous <Continuous>  oxybutynin 10 milliGRAM(s) Oral two times a day  polyethylene glycol 3350 17 Gram(s) Oral at bedtime  scopolamine   Patch 1 Patch Transdermal every 72 hours  triamcinolone 0.1% Oral Paste 1 Application(s) Topical two times a day    PRN MEDICATIONS  LORazepam   Injectable 1 milliGRAM(s) IV Push every 6 hours PRN  morphine  - Injectable 18 milliGRAM(s) IV Push every 2 hours PRN    VITALS:   T(F): --  HR: --  BP: --  RR: --  SpO2: --    LABS:                          Cultures:      RADIOLOGY:    PHYSICAL EXAM:  HEENT: paler, SUJIT  LUNGS: Clear to auscultation bilaterally   HEART: S1/S2 present. RRR.   ABD: Soft, non-tender, non-distended. Bowel sounds present, Supra pubic catheter +  EXT: R AKA, Left BKA  NEURO: AAOX1
LENGTH OF HOSPITAL STAY: 1d    CHIEF COMPLAINT:   Patient is a 59y old  Male who presents with a chief complaint of     Overnight events:    No acute events overnight    ALLERGIES:  sulfamethizole (Unknown)    MEDICATIONS:  STANDING MEDICATIONS  amitriptyline 25 milliGRAM(s) Oral at bedtime  collagenase Ointment 1 Application(s) Topical two times a day  Dakins Solution - 1/2 Strength 1 Application(s) Topical two times a day  dextrose 5% + sodium chloride 0.45%. 1000 milliLiter(s) IV Continuous <Continuous>  dronabinol 2.5 milliGRAM(s) Oral every 12 hours  levothyroxine 100 MICROGram(s) Oral daily  morphine  Infusion 2 mG/Hr IV Continuous <Continuous>  oxybutynin 10 milliGRAM(s) Oral two times a day  polyethylene glycol 3350 17 Gram(s) Oral at bedtime  triamcinolone 0.1% Oral Paste 1 Application(s) Topical two times a day    PRN MEDICATIONS  diazepam    Tablet 5 milliGRAM(s) Oral two times a day PRN  morphine  - Injectable 4 milliGRAM(s) IV Push every 30 minutes PRN    VITALS:   T(F): 97  HR: 88  BP: 125/72  RR: 18  SpO2: 97%    LABS:                          Cultures:      RADIOLOGY:    PHYSICAL EXAM:  HEENT: paler, rainer  LUNGS: Clear to auscultation bilaterally   HEART: S1/S2 present. RRR.   ABD: Soft, non-tender, non-distended. Bowel sounds present  EXT: R AKA, Left BKA  NEURO: AAOX1-2
LENGTH OF HOSPITAL STAY: 2d    CHIEF COMPLAINT:   Patient is a 59y old  Male who presents with a chief complaint of readmitted to Hospice service (29 Jan 2020 11:44)      Overnight events:    No acute events overnight    ALLERGIES:  sulfamethizole (Unknown)    MEDICATIONS:  STANDING MEDICATIONS  amitriptyline 25 milliGRAM(s) Oral at bedtime  collagenase Ointment 1 Application(s) Topical two times a day  Dakins Solution - 1/2 Strength 1 Application(s) Topical two times a day  dextrose 5% + sodium chloride 0.45%. 1000 milliLiter(s) IV Continuous <Continuous>  dronabinol 2.5 milliGRAM(s) Oral every 12 hours  levothyroxine 100 MICROGram(s) Oral daily  morphine  Infusion 10 mG/Hr IV Continuous <Continuous>  oxybutynin 10 milliGRAM(s) Oral two times a day  polyethylene glycol 3350 17 Gram(s) Oral at bedtime  triamcinolone 0.1% Oral Paste 1 Application(s) Topical two times a day    PRN MEDICATIONS  LORazepam   Injectable 0.5 milliGRAM(s) IV Push every 6 hours PRN  morphine  - Injectable 10 milliGRAM(s) IV Push every 2 hours PRN    VITALS:   T(F): --  HR: --  BP: --  RR: --  SpO2: --    LABS:                          Cultures:      RADIOLOGY:    PHYSICAL EXAM:  HEENT: paler, rainer  LUNGS: Clear to auscultation bilaterally   HEART: S1/S2 present. RRR.   ABD: Soft, non-tender, non-distended. Bowel sounds present, Supra pubic catheter +  EXT: R AKA, Left BKA  NEURO: AAOX1-2
LENGTH OF HOSPITAL STAY: 3d    CHIEF COMPLAINT:   Patient is a 59y old  Male who presents with a chief complaint of lower ext. pain (30 Jan 2020 17:44)      Overnight events:    No acute events overnight    ALLERGIES:  sulfamethizole (Unknown)    MEDICATIONS:  STANDING MEDICATIONS  amitriptyline 25 milliGRAM(s) Oral at bedtime  collagenase Ointment 1 Application(s) Topical two times a day  Dakins Solution - 1/2 Strength 1 Application(s) Topical two times a day  dextrose 5% + sodium chloride 0.45%. 1000 milliLiter(s) IV Continuous <Continuous>  dronabinol 2.5 milliGRAM(s) Oral every 12 hours  levothyroxine 100 MICROGram(s) Oral daily  morphine  Infusion 10 mG/Hr IV Continuous <Continuous>  oxybutynin 10 milliGRAM(s) Oral two times a day  polyethylene glycol 3350 17 Gram(s) Oral at bedtime  triamcinolone 0.1% Oral Paste 1 Application(s) Topical two times a day    PRN MEDICATIONS  LORazepam   Injectable 1 milliGRAM(s) IV Push every 4 hours PRN  morphine  - Injectable 10 milliGRAM(s) IV Push every 2 hours PRN    VITALS:   T(F): --  HR: --  BP: --  RR: --  SpO2: --    LABS:                          Cultures:      RADIOLOGY:    PHYSICAL EXAM:  HEENT: paler, rainer  LUNGS: Clear to auscultation bilaterally   HEART: S1/S2 present. RRR.   ABD: Soft, non-tender, non-distended. Bowel sounds present, Supra pubic catheter +  EXT: R AKA, Left BKA  NEURO: AAOX1-2
LENGTH OF HOSPITAL STAY: 4d    CHIEF COMPLAINT:   Patient is a 59y old  Male who presents with a chief complaint of Hospice services (31 Jan 2020 09:54)      Overnight events:    No acute events overnight    ALLERGIES:  sulfamethizole (Unknown)    MEDICATIONS:  STANDING MEDICATIONS  amitriptyline 25 milliGRAM(s) Oral at bedtime  collagenase Ointment 1 Application(s) Topical two times a day  Dakins Solution - 1/2 Strength 1 Application(s) Topical two times a day  dextrose 5% + sodium chloride 0.45%. 1000 milliLiter(s) IV Continuous <Continuous>  dronabinol 2.5 milliGRAM(s) Oral every 12 hours  levothyroxine 100 MICROGram(s) Oral daily  LORazepam   Injectable 1 milliGRAM(s) IV Push every 6 hours  morphine  Infusion 10 mG/Hr IV Continuous <Continuous>  oxybutynin 10 milliGRAM(s) Oral two times a day  polyethylene glycol 3350 17 Gram(s) Oral at bedtime  triamcinolone 0.1% Oral Paste 1 Application(s) Topical two times a day    PRN MEDICATIONS  LORazepam   Injectable 1 milliGRAM(s) IV Push every 4 hours PRN  morphine  - Injectable 10 milliGRAM(s) IV Push every 2 hours PRN    VITALS:   T(F): --  HR: --  BP: --  RR: --  SpO2: --    LABS:                          Cultures:      RADIOLOGY:    PHYSICAL EXAM:  HEENT: paler, rainer  LUNGS: Clear to auscultation bilaterally   HEART: S1/S2 present. RRR.   ABD: Soft, non-tender, non-distended. Bowel sounds present, Supra pubic catheter +  EXT: R AKA, Left BKA  NEURO: AAOX2-3
LENGTH OF HOSPITAL STAY: 6d    CHIEF COMPLAINT:   Patient is a 59y old  Male who presents with a chief complaint of Readmitted for Hospice (01 Feb 2020 08:47)      Overnight events:    No acute events overnight    ALLERGIES:  sulfamethizole (Unknown)    MEDICATIONS:  STANDING MEDICATIONS  amitriptyline 25 milliGRAM(s) Oral at bedtime  collagenase Ointment 1 Application(s) Topical two times a day  Dakins Solution - 1/2 Strength 1 Application(s) Topical two times a day  dextrose 5% + sodium chloride 0.45%. 1000 milliLiter(s) IV Continuous <Continuous>  dronabinol 2.5 milliGRAM(s) Oral every 12 hours  levothyroxine 100 MICROGram(s) Oral daily  LORazepam   Injectable 1 milliGRAM(s) IV Push every 6 hours  morphine  Infusion 14 mG/Hr IV Continuous <Continuous>  oxybutynin 10 milliGRAM(s) Oral two times a day  polyethylene glycol 3350 17 Gram(s) Oral at bedtime  triamcinolone 0.1% Oral Paste 1 Application(s) Topical two times a day    PRN MEDICATIONS  LORazepam   Injectable 1 milliGRAM(s) IV Push every 4 hours PRN  morphine  - Injectable 10 milliGRAM(s) IV Push every 2 hours PRN    VITALS:   T(F): --  HR: --  BP: --  RR: --  SpO2: --    LABS:                          Cultures:      RADIOLOGY:    PHYSICAL EXAM:  HEENT: paler, rainer  LUNGS: Clear to auscultation bilaterally   HEART: S1/S2 present. RRR.   ABD: Soft, non-tender, non-distended. Bowel sounds present, Supra pubic catheter +  EXT: R AKA, Left BKA  NEURO: AAOX2-3
LENGTH OF HOSPITAL STAY: 7d    CHIEF COMPLAINT:   Patient is a 59y old  Male who presents with a chief complaint of Hospice (03 Feb 2020 10:43)  Mother was complaining of patient's agitation and pain    ALLERGIES:  sulfamethizole (Unknown)    MEDICATIONS:  STANDING MEDICATIONS  amitriptyline 25 milliGRAM(s) Oral at bedtime  collagenase Ointment 1 Application(s) Topical two times a day  Dakins Solution - 1/2 Strength 1 Application(s) Topical two times a day  dextrose 5% + sodium chloride 0.45%. 1000 milliLiter(s) IV Continuous <Continuous>  dronabinol 2.5 milliGRAM(s) Oral every 12 hours  levothyroxine 100 MICROGram(s) Oral daily  LORazepam   Injectable 1 milliGRAM(s) IV Push every 6 hours  morphine  Infusion 14 mG/Hr IV Continuous <Continuous>  ondansetron Injectable 4 milliGRAM(s) IV Push once  oxybutynin 10 milliGRAM(s) Oral two times a day  polyethylene glycol 3350 17 Gram(s) Oral at bedtime  triamcinolone 0.1% Oral Paste 1 Application(s) Topical two times a day    PRN MEDICATIONS  LORazepam   Injectable 1 milliGRAM(s) IV Push every 4 hours PRN  morphine  - Injectable 10 milliGRAM(s) IV Push every 2 hours PRN    VITALS:   T(F): --  HR: --  BP: --  RR: --  SpO2: --    LABS:                          Cultures:      RADIOLOGY:    PHYSICAL EXAM:  HEENT: paler, rainer  LUNGS: Clear to auscultation bilaterally   HEART: S1/S2 present. RRR.   ABD: Soft, non-tender, non-distended. Bowel sounds present, Supra pubic catheter +  EXT: R AKA, Left BKA  NEURO: AAOX2-3
LENGTH OF HOSPITAL STAY: 8d    CHIEF COMPLAINT:   Patient is a 59y old  Male who presents with a chief complaint of Hospice (04 Feb 2020 10:32)  Lying comfortably, sleeping        ALLERGIES:  sulfamethizole (Unknown)    MEDICATIONS:  STANDING MEDICATIONS  amitriptyline 25 milliGRAM(s) Oral at bedtime  collagenase Ointment 1 Application(s) Topical two times a day  Dakins Solution - 1/2 Strength 1 Application(s) Topical two times a day  dextrose 5% + sodium chloride 0.45%. 1000 milliLiter(s) IV Continuous <Continuous>  gabapentin 100 milliGRAM(s) Oral three times a day  levothyroxine 100 MICROGram(s) Oral daily  LORazepam   Injectable 2 milliGRAM(s) IV Push every 6 hours  morphine  Infusion 16 mG/Hr IV Continuous <Continuous>  oxybutynin 10 milliGRAM(s) Oral two times a day  polyethylene glycol 3350 17 Gram(s) Oral at bedtime  triamcinolone 0.1% Oral Paste 1 Application(s) Topical two times a day    PRN MEDICATIONS  LORazepam   Injectable 1 milliGRAM(s) IV Push every 6 hours PRN  morphine  - Injectable 16 milliGRAM(s) IV Push every 2 hours PRN    VITALS:   T(F): --  HR: --  BP: --  RR: --  SpO2: --    LABS:                          Cultures:      RADIOLOGY:    PHYSICAL EXAM:  HEENT: paler, rainer  LUNGS: Clear to auscultation bilaterally   HEART: S1/S2 present. RRR.   ABD: Soft, non-tender, non-distended. Bowel sounds present, Supra pubic catheter +  EXT: R AKA, Left BKA  NEURO: AAOX0
LENGTH OF HOSPITAL STAY: 9d    CHIEF COMPLAINT:   Patient is a 59y old  Male who presents with a chief complaint of Hospice (05 Feb 2020 10:36)      Overnight events:    1 episode of agitation, was given PRN meds that made him comfortable    ALLERGIES:  sulfamethizole (Unknown)    MEDICATIONS:  STANDING MEDICATIONS  amitriptyline 25 milliGRAM(s) Oral at bedtime  collagenase Ointment 1 Application(s) Topical two times a day  Dakins Solution - 1/2 Strength 1 Application(s) Topical two times a day  dextrose 5% + sodium chloride 0.45%. 1000 milliLiter(s) IV Continuous <Continuous>  gabapentin 100 milliGRAM(s) Oral three times a day  levothyroxine 100 MICROGram(s) Oral daily  LORazepam   Injectable 2 milliGRAM(s) IV Push every 6 hours  morphine  Infusion 18 mG/Hr IV Continuous <Continuous>  oxybutynin 10 milliGRAM(s) Oral two times a day  polyethylene glycol 3350 17 Gram(s) Oral at bedtime  scopolamine   Patch 1 Patch Transdermal every 72 hours  triamcinolone 0.1% Oral Paste 1 Application(s) Topical two times a day    PRN MEDICATIONS  LORazepam   Injectable 1 milliGRAM(s) IV Push every 6 hours PRN  morphine  - Injectable 16 milliGRAM(s) IV Push every 2 hours PRN    VITALS:   T(F): --  HR: --  BP: --  RR: --  SpO2: --    LABS:                          Cultures:      RADIOLOGY:    PHYSICAL EXAM:  HEENT: paler, rainer  LUNGS: Clear to auscultation bilaterally   HEART: S1/S2 present. RRR.   ABD: Soft, non-tender, non-distended. Bowel sounds present, Supra pubic catheter +  EXT: R AKA, Left BKA  NEURO: AAOX1
Patient is a 59y old  Male who presents with a chief complaint of Hospice (08 Feb 2020 07:51)      OVERNIGHT EVENTS: remained stable, same as previous few days    SUBJECTIVE / INTERVAL HPI: Patient seen and examined at bedside.     VITAL SIGNS:  Vital Signs Last 24 Hrs  T(C): --  T(F): --  HR: --  BP: --  BP(mean): --  RR: --  SpO2: --    PHYSICAL EXAM:    General: WDWN  HEENT: NC/AT; PERRL, clear conjunctiva  Neck: supple  Cardiovascular: +S1/S2; RRR  Respiratory: CTA b/l; no W/R/R  Gastrointestinal: soft, NT/ND; +BSx4  Extremities: WWP; 2+ peripheral pulses; no edema   Neurological: AAOx3; no focal deficits    MEDICATIONS:  MEDICATIONS  (STANDING):  amitriptyline 25 milliGRAM(s) Oral at bedtime  collagenase Ointment 1 Application(s) Topical two times a day  Dakins Solution - 1/2 Strength 1 Application(s) Topical two times a day  dextrose 5% + sodium chloride 0.45%. 1000 milliLiter(s) (25 mL/Hr) IV Continuous <Continuous>  gabapentin 100 milliGRAM(s) Oral three times a day  haloperidol    Injectable 2 milliGRAM(s) IV Push <User Schedule>  levothyroxine 100 MICROGram(s) Oral daily  LORazepam   Injectable 2 milliGRAM(s) IV Push every 4 hours  morphine  Infusion 18 mG/Hr (18 mL/Hr) IV Continuous <Continuous>  oxybutynin 10 milliGRAM(s) Oral two times a day  polyethylene glycol 3350 17 Gram(s) Oral at bedtime  scopolamine   Patch 1 Patch Transdermal every 72 hours  triamcinolone 0.1% Oral Paste 1 Application(s) Topical two times a day    MEDICATIONS  (PRN):  LORazepam   Injectable 1 milliGRAM(s) IV Push every 6 hours PRN Agitation  morphine  - Injectable 18 milliGRAM(s) IV Push every 1 hour PRN Severe Pain (7 - 10)      ALLERGIES:  Allergies    sulfamethizole (Unknown)    Intolerances        LABS:              CAPILLARY BLOOD GLUCOSE          RADIOLOGY & ADDITIONAL TESTS: Reviewed.    ASSESSMENT:    PLAN:
Patient is a 59y old  Male who presents with a chief complaint of elevated potassium and hypoglycemia (15 Toni 2020 16:23)    Patient was seen and examined.  Pt on IV morphine drip , on comfort care  Pt awake, alert . Denies any pain    PAST MEDICAL & SURGICAL HISTORY:  Anemia  PAD (peripheral artery disease)  Hypertension  Suprapubic catheter  Pressure ulcer  Diabetes  Lumbar compression fracture  S/P below knee amputation, left  S/P below knee amputation, right  S/P debridement  Toe amputation status, right  H/O hernia repair    Allergies  sulfamethizole (Unknown)    MEDICATIONS  (STANDING):  amitriptyline 25 milliGRAM(s) Oral at bedtime  collagenase Ointment 1 Application(s) Topical two times a day  Dakins Solution - 1/2 Strength 1 Application(s) Topical two times a day  dextrose 5% + sodium chloride 0.45%. 1000 milliLiter(s) (75 mL/Hr) IV Continuous <Continuous>  dronabinol 2.5 milliGRAM(s) Oral every 12 hours  levothyroxine 100 MICROGram(s) Oral daily  morphine  Infusion 10 mG/Hr (10 mL/Hr) IV Continuous <Continuous>  oxybutynin 10 milliGRAM(s) Oral two times a day  polyethylene glycol 3350 17 Gram(s) Oral at bedtime  triamcinolone 0.1% Oral Paste 1 Application(s) Topical two times a day    MEDICATIONS  (PRN):  LORazepam   Injectable 0.5 milliGRAM(s) IV Push every 6 hours PRN Agitation  morphine  - Injectable 10 milliGRAM(s) IV Push every 2 hours PRN Severe Pain (7 - 10)    O/E:  Awake,  not in distress.  HEENT: atraumatic, EOMI.  Chest: clear.  CVS: SIS2 +, no murmur.  P/A: Soft, BS+  CNS: non focal.  Ext: B/l BKA   Skin: b/l buttock and sacral ulcers  All systems reviewed positive findings as above.
Patient is a 59y old  Male who presents with a chief complaint of elevated potassium and hypoglycemia (15 Toni 2020 16:23)    Patient was seen and examined.  Pt on IV morphine drip , on comfort care  Pt awake, alert.   Denies pain    PAST MEDICAL & SURGICAL HISTORY:  Anemia  PAD (peripheral artery disease)  Hypertension  Suprapubic catheter  Pressure ulcer  Diabetes  Lumbar compression fracture  S/P below knee amputation, left  S/P below knee amputation, right  S/P debridement  Toe amputation status, right  H/O hernia repair    Allergies  sulfamethizole (Unknown)    MEDICATIONS  (STANDING):  amitriptyline 25 milliGRAM(s) Oral at bedtime  collagenase Ointment 1 Application(s) Topical two times a day  Dakins Solution - 1/2 Strength 1 Application(s) Topical two times a day  dextrose 5% + sodium chloride 0.45%. 1000 milliLiter(s) (75 mL/Hr) IV Continuous <Continuous>  dronabinol 2.5 milliGRAM(s) Oral every 12 hours  levothyroxine 100 MICROGram(s) Oral daily  LORazepam   Injectable 1 milliGRAM(s) IV Push every 6 hours  morphine  Infusion 14 mG/Hr (14 mL/Hr) IV Continuous <Continuous>  oxybutynin 10 milliGRAM(s) Oral two times a day  polyethylene glycol 3350 17 Gram(s) Oral at bedtime  triamcinolone 0.1% Oral Paste 1 Application(s) Topical two times a day    MEDICATIONS  (PRN):  LORazepam   Injectable 1 milliGRAM(s) IV Push every 4 hours PRN Agitation  morphine  - Injectable 10 milliGRAM(s) IV Push every 2 hours PRN Severe Pain (7 - 10)    O/E:  Awake,  not in distress.  HEENT: atraumatic, EOMI.  Chest: clear.  CVS: SIS2 +, no murmur.  P/A: Soft, BS+  CNS: non focal.  Ext: B/l BKA   Skin: b/l buttock and sacral ulcers  All systems reviewed positive findings as above.
Patient is a 59y old  Male who presents with a chief complaint of elevated potassium and hypoglycemia (15 Toni 2020 16:23)    Patient was seen and examined.  Pt on IV morphine drip , on comfort care  Pt is C/o pain in right lower ext , has been trying to pull his foleys catheter, hallucinating since last night --> dose of morphine and ativan increased by hospice nurse.    PAST MEDICAL & SURGICAL HISTORY:  Anemia  PAD (peripheral artery disease)  Hypertension  Suprapubic catheter  Pressure ulcer  Diabetes  Lumbar compression fracture  S/P below knee amputation, left  S/P below knee amputation, right  S/P debridement  Toe amputation status, right  H/O hernia repair    Allergies  sulfamethizole (Unknown)    MEDICATIONS  (STANDING):  amitriptyline 25 milliGRAM(s) Oral at bedtime  collagenase Ointment 1 Application(s) Topical two times a day  Dakins Solution - 1/2 Strength 1 Application(s) Topical two times a day  dextrose 5% + sodium chloride 0.45%. 1000 milliLiter(s) (75 mL/Hr) IV Continuous <Continuous>  dronabinol 2.5 milliGRAM(s) Oral every 12 hours  levothyroxine 100 MICROGram(s) Oral daily  morphine  Infusion 16 mG/Hr (16 mL/Hr) IV Continuous <Continuous>  ondansetron Injectable 4 milliGRAM(s) IV Push once  oxybutynin 10 milliGRAM(s) Oral two times a day  polyethylene glycol 3350 17 Gram(s) Oral at bedtime  triamcinolone 0.1% Oral Paste 1 Application(s) Topical two times a day    MEDICATIONS  (PRN):  LORazepam   Injectable 1 milliGRAM(s) IV Push every 4 hours PRN Agitation  morphine  - Injectable 16 milliGRAM(s) IV Push every 2 hours PRN Severe Pain (7 - 10)      O/E:  Awake, confused not in distress.  HEENT: atraumatic, EOMI.  Chest: clear.  CVS: SIS2 +, no murmur.  P/A: Soft, BS+  CNS: awake, confused  Ext: B/l BKA   Skin: b/l buttock and sacral ulcers  All systems reviewed positive findings as above.
Patient is a 59y old  Male who presents with a chief complaint of elevated potassium and hypoglycemia (15 Toni 2020 16:23)    Patient was seen and examined.  Pt on IV morphine drip,  on comfort care  Patient resting comfortably    PAST MEDICAL & SURGICAL HISTORY:  Anemia  PAD (peripheral artery disease)  Hypertension  Suprapubic catheter  Pressure ulcer  Diabetes  Lumbar compression fracture  S/P below knee amputation, left  S/P below knee amputation, right  S/P debridement  Toe amputation status, right  H/O hernia repair    Allergies  sulfamethizole (Unknown)    MEDICATIONS  (STANDING):  amitriptyline 25 milliGRAM(s) Oral at bedtime  collagenase Ointment 1 Application(s) Topical two times a day  Dakins Solution - 1/2 Strength 1 Application(s) Topical two times a day  dextrose 5% + sodium chloride 0.45%. 1000 milliLiter(s) (75 mL/Hr) IV Continuous <Continuous>  gabapentin 100 milliGRAM(s) Oral three times a day  haloperidol    Injectable 2 milliGRAM(s) IV Push <User Schedule>  levothyroxine 100 MICROGram(s) Oral daily  LORazepam   Injectable 2 milliGRAM(s) IV Push every 4 hours  morphine  Infusion 18 mG/Hr (18 mL/Hr) IV Continuous <Continuous>  oxybutynin 10 milliGRAM(s) Oral two times a day  polyethylene glycol 3350 17 Gram(s) Oral at bedtime  scopolamine   Patch 1 Patch Transdermal every 72 hours  triamcinolone 0.1% Oral Paste 1 Application(s) Topical two times a day    MEDICATIONS  (PRN):  LORazepam   Injectable 1 milliGRAM(s) IV Push every 6 hours PRN Agitation  morphine  - Injectable 18 milliGRAM(s) IV Push every 2 hours PRN Severe Pain (7 - 10)    O/E: Resting,  not in distress.  HEENT: atraumatic, EOMI.  Chest: clear.  CVS: SIS2 +, no murmur.  P/A: Soft, BS+  CNS: non focal  Ext: B/l BKA   Skin: b/l buttock and sacral ulcers  All systems reviewed positive findings as above.
Patient is a 59y old  Male who presents with a chief complaint of elevated potassium and hypoglycemia (15 Toni 2020 16:23)    Patient was seen and examined.  Pt on IV morphine drip,  on comfort care  Patient resting comfortably.    PAST MEDICAL & SURGICAL HISTORY:  Anemia  PAD (peripheral artery disease)  Hypertension  Suprapubic catheter  Pressure ulcer  Diabetes  Lumbar compression fracture  S/P below knee amputation, left  S/P below knee amputation, right  S/P debridement  Toe amputation status, right  H/O hernia repair    Allergies  sulfamethizole (Unknown)    MEDICATIONS  (STANDING):  amitriptyline 25 milliGRAM(s) Oral at bedtime  collagenase Ointment 1 Application(s) Topical two times a day  Dakins Solution - 1/2 Strength 1 Application(s) Topical two times a day  dextrose 5% + sodium chloride 0.45%. 1000 milliLiter(s) (25 mL/Hr) IV Continuous <Continuous>  gabapentin 100 milliGRAM(s) Oral three times a day  haloperidol    Injectable 2 milliGRAM(s) IV Push <User Schedule>  levothyroxine 100 MICROGram(s) Oral daily  LORazepam   Injectable 2 milliGRAM(s) IV Push every 4 hours  morphine  Infusion 18 mG/Hr (18 mL/Hr) IV Continuous <Continuous>  oxybutynin 10 milliGRAM(s) Oral two times a day  polyethylene glycol 3350 17 Gram(s) Oral at bedtime  scopolamine   Patch 1 Patch Transdermal every 72 hours  triamcinolone 0.1% Oral Paste 1 Application(s) Topical two times a day    MEDICATIONS  (PRN):  LORazepam   Injectable 1 milliGRAM(s) IV Push every 6 hours PRN Agitation  morphine  - Injectable 18 milliGRAM(s) IV Push every 1 hour PRN Severe Pain (7 - 10)    O/E: Resting,  not in distress.  HEENT: atraumatic, EOMI.  Chest: coarse breath sounds  CVS: SIS2 +, no murmur.  P/A: Soft, BS+  CNS: non focal  Ext: B/l BKA   Skin: b/l buttock and sacral ulcers  All systems reviewed positive findings as above.
Patient is a 59y old  Male who presents with a chief complaint of elevated potassium and hypoglycemia (15 Toni 2020 16:23)    Patient was seen and examined.  Pt on IV morphine drip,  on comfort care  Pt awake, hallucinating--> hospice nurse increased morphine dose. Pt was also started on haldol.  gabapentin was recommended for pain, pt unable to swallow    PAST MEDICAL & SURGICAL HISTORY:  Anemia  PAD (peripheral artery disease)  Hypertension  Suprapubic catheter  Pressure ulcer  Diabetes  Lumbar compression fracture  S/P below knee amputation, left  S/P below knee amputation, right  S/P debridement  Toe amputation status, right  H/O hernia repair    Allergies  sulfamethizole (Unknown)    MEDICATIONS  (STANDING):  amitriptyline 25 milliGRAM(s) Oral at bedtime  collagenase Ointment 1 Application(s) Topical two times a day  Dakins Solution - 1/2 Strength 1 Application(s) Topical two times a day  dextrose 5% + sodium chloride 0.45%. 1000 milliLiter(s) (75 mL/Hr) IV Continuous <Continuous>  gabapentin 100 milliGRAM(s) Oral three times a day  haloperidol    Injectable 2 milliGRAM(s) IV Push <User Schedule>  levothyroxine 100 MICROGram(s) Oral daily  LORazepam   Injectable 2 milliGRAM(s) IV Push every 4 hours  morphine  Infusion 18 mG/Hr (18 mL/Hr) IV Continuous <Continuous>  oxybutynin 10 milliGRAM(s) Oral two times a day  polyethylene glycol 3350 17 Gram(s) Oral at bedtime  scopolamine   Patch 1 Patch Transdermal every 72 hours  triamcinolone 0.1% Oral Paste 1 Application(s) Topical two times a day    MEDICATIONS  (PRN):  LORazepam   Injectable 1 milliGRAM(s) IV Push every 6 hours PRN Agitation  morphine  - Injectable 18 milliGRAM(s) IV Push every 2 hours PRN Severe Pain (7 - 10)    O/E:  Awake, not in distress.  HEENT: atraumatic, EOMI.  Chest: clear.  CVS: SIS2 +, no murmur.  P/A: Soft, BS+  CNS: awake,   Ext: B/l BKA   Skin: b/l buttock and sacral ulcers  All systems reviewed positive findings as above.
Patient is a 59y old  Male who presents with a chief complaint of elevated potassium and hypoglycemia (15 Toni 2020 16:23)    Patient was seen and examined.  Pt on IV morphine drip,  on comfort care  no new complaints    PAST MEDICAL & SURGICAL HISTORY:  Anemia  PAD (peripheral artery disease)  Hypertension  Suprapubic catheter  Pressure ulcer  Diabetes  Lumbar compression fracture  S/P below knee amputation, left  S/P below knee amputation, right  S/P debridement  Toe amputation status, right  H/O hernia repair    Allergies  sulfamethizole (Unknown)    MEDICATIONS  (STANDING):  amitriptyline 25 milliGRAM(s) Oral at bedtime  collagenase Ointment 1 Application(s) Topical two times a day  Dakins Solution - 1/2 Strength 1 Application(s) Topical two times a day  dextrose 5% + sodium chloride 0.45%. 1000 milliLiter(s) (75 mL/Hr) IV Continuous <Continuous>  gabapentin 100 milliGRAM(s) Oral three times a day  levothyroxine 100 MICROGram(s) Oral daily  LORazepam   Injectable 2 milliGRAM(s) IV Push every 6 hours  morphine  Infusion 16 mG/Hr (16 mL/Hr) IV Continuous <Continuous>  oxybutynin 10 milliGRAM(s) Oral two times a day  polyethylene glycol 3350 17 Gram(s) Oral at bedtime  scopolamine   Patch 1 Patch Transdermal every 72 hours  triamcinolone 0.1% Oral Paste 1 Application(s) Topical two times a day    MEDICATIONS  (PRN):  LORazepam   Injectable 1 milliGRAM(s) IV Push every 6 hours PRN Agitation  morphine  - Injectable 16 milliGRAM(s) IV Push every 2 hours PRN Severe Pain (7 - 10)    O/E:  Awake, not in distress.  HEENT: atraumatic, EOMI.  Chest: clear.  CVS: SIS2 +, no murmur.  P/A: Soft, BS+  CNS: awake,   Ext: B/l BKA   Skin: b/l buttock and sacral ulcers  All systems reviewed positive findings as above.
Patient is a 59y old  Male who presents with a chief complaint of elevated potassium and hypoglycemia (15 Toni 2020 16:23)    Patient was seen and examined.  Pt on IV morphine drip , on comfort care  Pt awake, alert.   Denies pain    PAST MEDICAL & SURGICAL HISTORY:  Anemia  PAD (peripheral artery disease)  Hypertension  Suprapubic catheter  Pressure ulcer  Diabetes  Lumbar compression fracture  S/P below knee amputation, left  S/P below knee amputation, right  S/P debridement  Toe amputation status, right  H/O hernia repair    Allergies  sulfamethizole (Unknown)    MEDICATIONS  (STANDING):  amitriptyline 25 milliGRAM(s) Oral at bedtime  collagenase Ointment 1 Application(s) Topical two times a day  Dakins Solution - 1/2 Strength 1 Application(s) Topical two times a day  dextrose 5% + sodium chloride 0.45%. 1000 milliLiter(s) (75 mL/Hr) IV Continuous <Continuous>  dronabinol 2.5 milliGRAM(s) Oral every 12 hours  levothyroxine 100 MICROGram(s) Oral daily  LORazepam   Injectable 1 milliGRAM(s) IV Push every 6 hours  morphine  Infusion 10 mG/Hr (10 mL/Hr) IV Continuous <Continuous>  oxybutynin 10 milliGRAM(s) Oral two times a day  polyethylene glycol 3350 17 Gram(s) Oral at bedtime  triamcinolone 0.1% Oral Paste 1 Application(s) Topical two times a day    MEDICATIONS  (PRN):  LORazepam   Injectable 1 milliGRAM(s) IV Push every 4 hours PRN Agitation  morphine  - Injectable 10 milliGRAM(s) IV Push every 2 hours PRN Severe Pain (7 - 10)    O/E:  Awake,  not in distress.  HEENT: atraumatic, EOMI.  Chest: clear.  CVS: SIS2 +, no murmur.  P/A: Soft, BS+  CNS: non focal.  Ext: B/l BKA   Skin: b/l buttock and sacral ulcers  All systems reviewed positive findings as above.

## 2020-02-10 NOTE — PROGRESS NOTE ADULT - ASSESSMENT
58 yo M with hx of PAD s/p R AKA, L BKA, Paraplegia with multiple sacral ulcers (stage 4) on suprapubic catheter, HTN, CKD stage IV, Hypothyroidism Opioid dependence readmitted to hospice service. Patient was recently admitted to [facility] for severe sepsis secondary to MRSA bacteremia secondary to sacral OM. now readmitted to Hospice for management of uncontrolled pain until patient has bed ready at [facility].   * Patient is on HOSPICE service * hospice nurses are available 24 hours. any question please call numbers on manual chart in rack    Uncontrolled pain likely 2/2 stage 4 sacral ulcers   -morphine drip, , Haldol, ativan, Gabapentin,   - patient is on morphine gtt 2ml/hour and morphine 4mg every 30 min PRN  - c/w bowel regimen   - soap and water qd, santyl ointment and dakin solution wet to dry dressing , ABD pad dressing change qd as per Burn recs.   - Hospice RN Vero recommended the following regimen today (she can be contacted at ()        MRSA bacteremia likely from Sacral OM   - Hospice, no abs    Worsening CKD stage 4  - patient refused HD  - patient refused labs   - patient is on Hospice.     Hypoglycemia  - likely 2/2 malnutrition and methadone induced  - patient off methadone already.   - D5+NS  - as per ENDO no need of checking FS and Pt is on Hospice    PAD s/p right side AKA and left below knee amputation  - incision sites look clean  - Removal of staples from LLE stump on 1/19    Hx esophageal candidiasis  - Completed 14 days of fluconazole course,       DVT PPX: Heparin SC  GI PPx: not indicated  Diet: K restricted  Activity: OOBTC  Dispo: pending [facility] house  CODE: DNR/DNI/no feeding tubes/no hemodialysis/no IV antibiotics blood draws/ Comfort measures only

## 2020-02-11 NOTE — DISCHARGE NOTE FOR THE EXPIRED PATIENT - HOSPITAL COURSE
60 yo M with hx of PAD s/p R AKA, L BKA, Paraplegia with multiple sacral ulcers (stage 4) on suprapubic catheter, HTN, CKD stage IV, Hypothyroidism Opioid dependence readmitted to hospice service. Patient was recently admitted to [facility] for  severe sepsis secondary to MRSA bacteremia secondary to sacral OM. Patient was also found to have worsening CKD stage IV. Patient was evaluated by Nephro and had discussion regarding possible dialysis in the near future. However, patient refused HD as well as IV abx for MRSA bacteremia from sacral OM. Patient made him self DNR, DNI and wanted hospice and comfort care and wanted to be discharged to [facility] house, pt while waiting for hospice placement was treated by Hospice team at [facility], pt was found to be pulseless and based on cardiopulmonary arrest criteria was pronounced death on 2/11/202 at 7:28 am

## 2020-02-19 DIAGNOSIS — E11.22 TYPE 2 DIABETES MELLITUS WITH DIABETIC CHRONIC KIDNEY DISEASE: ICD-10-CM

## 2020-02-19 DIAGNOSIS — E03.9 HYPOTHYROIDISM, UNSPECIFIED: ICD-10-CM

## 2020-02-19 DIAGNOSIS — I46.9 CARDIAC ARREST, CAUSE UNSPECIFIED: ICD-10-CM

## 2020-02-19 DIAGNOSIS — E87.5 HYPERKALEMIA: ICD-10-CM

## 2020-02-19 DIAGNOSIS — Z51.5 ENCOUNTER FOR PALLIATIVE CARE: ICD-10-CM

## 2020-02-19 DIAGNOSIS — E11.69 TYPE 2 DIABETES MELLITUS WITH OTHER SPECIFIED COMPLICATION: ICD-10-CM
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
## 2020-02-19 DIAGNOSIS — D63.1 ANEMIA IN CHRONIC KIDNEY DISEASE: ICD-10-CM

## 2020-02-19 DIAGNOSIS — E11.51 TYPE 2 DIABETES MELLITUS WITH DIABETIC PERIPHERAL ANGIOPATHY WITHOUT GANGRENE: ICD-10-CM

## 2020-02-19 DIAGNOSIS — R52 PAIN, UNSPECIFIED: ICD-10-CM

## 2020-02-19 DIAGNOSIS — I12.9 HYPERTENSIVE CHRONIC KIDNEY DISEASE WITH STAGE 1 THROUGH STAGE 4 CHRONIC KIDNEY DISEASE, OR UNSPECIFIED CHRONIC KIDNEY DISEASE: ICD-10-CM

## 2020-02-19 DIAGNOSIS — L89.154 PRESSURE ULCER OF SACRAL REGION, STAGE 4: ICD-10-CM

## 2020-02-19 DIAGNOSIS — R78.81 BACTEREMIA: ICD-10-CM

## 2020-02-19 DIAGNOSIS — T83.498A OTHER MECHANICAL COMPLICATION OF OTHER PROSTHETIC DEVICES, IMPLANTS AND GRAFTS OF GENITAL TRACT, INITIAL ENCOUNTER: ICD-10-CM

## 2020-02-19 DIAGNOSIS — Z66 DO NOT RESUSCITATE: ICD-10-CM

## 2020-02-19 DIAGNOSIS — G82.20 PARAPLEGIA, UNSPECIFIED: ICD-10-CM

## 2020-02-19 DIAGNOSIS — M86.9 OSTEOMYELITIS, UNSPECIFIED: ICD-10-CM

## 2020-02-19 DIAGNOSIS — E46 UNSPECIFIED PROTEIN-CALORIE MALNUTRITION: ICD-10-CM

## 2020-02-19 DIAGNOSIS — R44.3 HALLUCINATIONS, UNSPECIFIED: ICD-10-CM

## 2020-02-19 DIAGNOSIS — F11.29 OPIOID DEPENDENCE WITH UNSPECIFIED OPIOID-INDUCED DISORDER: ICD-10-CM

## 2020-02-19 DIAGNOSIS — N18.4 CHRONIC KIDNEY DISEASE, STAGE 4 (SEVERE): ICD-10-CM

## 2020-02-19 DIAGNOSIS — E11.649 TYPE 2 DIABETES MELLITUS WITH HYPOGLYCEMIA WITHOUT COMA: ICD-10-CM

## 2020-05-18 NOTE — ED ADULT NURSE NOTE - NS ED NURSE REPORT GIVEN DT
Patient scheduled today per dr. Chamberlain.     COVID Screen    Does Patient OR patient's household members have any of the following:    · Temperature: Fever greater than or equal to 100.4 F   No   · Respiratory symptoms: New or worsening cough, shortness of breath, or sore throat   No   · GI Symptoms: New onset of nausea, vomiting or diarrhea   No   · Miscellaneous: New onset of loss of taste or smell, chills, repeated shaking with chills, muscle pain or headache   No   · Contact with anyone who has tested positive or suspected of having covid?   No   · Travel in the last 14 days   No     If patient is scheduled for a non virtual appointment (in clinic):  • Patient asked to bring own mask if available or told a mask will be given at arrival to clinic   Yes  • Patient informed of visitor restrictions (if a visitor/chaperone is necessary please list name of person)   Yes           27-Nov-2019 00:35

## 2020-06-15 NOTE — PATIENT PROFILE ADULT - .
Vital Signs Last 24 Hrs  T(C): 37.1 (15 Shon 2020 13:11), Max: 37.3 (14 Jun 2020 15:31)  T(F): 98.8 (15 Shon 2020 13:11), Max: 99.2 (14 Jun 2020 15:31)  HR: 80 (15 Shon 2020 13:11) (76 - 92)  BP: 122/89 (15 Shon 2020 13:11) (120/73 - 135/85)  BP(mean): --  RR: 18 (15 Shon 2020 06:10) (18 - 18)  SpO2: 97% (15 Shon 2020 13:11) (97% - 100%) 10-Sep-2019 05:22:17

## 2020-10-27 NOTE — DIETITIAN INITIAL EVALUATION ADULT. - OTHER INFO
Patient presented to ER for episode of unresponsiveness witnessed by family, intubated for airway protection; acute respiratory failure 2/2 metabolic encephalopathy, AMS 2/2 mixed respiratory and metabolic acidosis 2/2 suspected intoxication with benzodiazepines and/or narcotics; underlying aspiration PNA/suspected chronic osteomyelitis; SILVINA on chronic renal insufficiency- likely prerenal, renal c/s; chronic anemia, hyperkalemia, hypomagnesemia; poor prognosis noted. Patient is intubated, nutrition history unable to be obtained. NKFA noted. Noted with wt of 70.8 kg on 6/13, using wt of 68.6 kg this would indicate a 3% wt loss x 4 months which is not significant. NPO at this time. No

## 2020-11-13 NOTE — SWALLOW BEDSIDE ASSESSMENT ADULT - COMMENTS
+toleration w/o overt s/s penetration/aspiration w/ hard solids. If pt completing liquid wash to clear oral residue, pt w/ intermittent coughing.
Statement Selected
+toleration w/o overt s/s penetration/aspiration w/ solids
tolerated

## 2021-01-22 NOTE — ED PROVIDER NOTE - ENMT NEGATIVE STATEMENT, MLM
SUBJECTIVE:   Fredrick Yeung is a 15 month old male, here for a routine health maintenance visit,   accompanied by his mother.    Patient was roomed by: Dhara  Do you have any forms to be completed?  no    SOCIAL HISTORY  Child lives with: mother and father  Who takes care of your child: mother and father  Language(s) spoken at home: English  Recent family changes/social stressors: none noted    SAFETY/HEALTH RISK  Is your child around anyone who smokes?  No   TB exposure:           None    Is your car seat less than 6 years old, in the back seat, rear-facing, 5-point restraint:  Yes  Home Safety Survey:    Stairs gated: Yes    Wood stove/Fireplace screened: Yes    Poisons/cleaning supplies out of reach: Yes    Swimming pool: No    Guns/firearms in the home: No    DAILY ACTIVITIES  NUTRITION:  good appetite, eats variety of foods    SLEEP  Arrangements:  Patterns:    sleeps through night    ELIMINATION  Stools:    normal soft stools    normal wet diapers    DENTAL  Water source:  city water  Does your child have a dental provider: NO  Has your child seen a dentist in the last 6 months: NO   Dental health HIGH risk factors: none    Dental visit recommended: Yes  Dental varnish declined by parent    HEARING/VISION: no concerns, hearing and vision subjectively normal.    DEVELOPMENT  Screening tool used, reviewed with parent/guardian:       QUESTIONS/CONCERNS: None    PROBLEM LIST  There is no problem list on file for this patient.    MEDICATIONS  No current outpatient medications on file.      ALLERGY  No Known Allergies    IMMUNIZATIONS  Immunization History   Administered Date(s) Administered     DTAP-IPV/HIB (PENTACEL) 2019, 02/21/2020, 04/23/2020     Hep B, Peds or Adolescent 2019, 02/21/2020, 04/23/2020     HepA-ped 2 Dose 11/06/2020     Influenza Vaccine IM > 6 months Valent IIV4 11/06/2020     MMR 11/06/2020     Pneumo Conj 13-V (2010&after) 2019, 02/21/2020, 04/23/2020     Rotavirus,  "monovalent, 2-dose 2019, 02/21/2020     Varicella 11/06/2020       HEALTH HISTORY SINCE LAST VISIT  No surgery, major illness or injury since last physical exam    ROS  Constitutional, eye, ENT, skin, respiratory, cardiac, GI, MSK, neuro, and allergy are normal except as otherwise noted.    OBJECTIVE:   EXAM  Pulse 131   Temp 98.9  F (37.2  C) (Tympanic)   Resp 20   Ht 0.787 m (2' 7\")   Wt 9.526 kg (21 lb)   SpO2 100%   BMI 15.36 kg/m    No head circumference on file for this encounter.  23 %ile (Z= -0.75) based on WHO (Boys, 0-2 years) weight-for-age data using vitals from 1/22/2021.  41 %ile (Z= -0.22) based on WHO (Boys, 0-2 years) Length-for-age data based on Length recorded on 1/22/2021.  20 %ile (Z= -0.85) based on WHO (Boys, 0-2 years) weight-for-recumbent length data based on body measurements available as of 1/22/2021.  GENERAL: Active, alert, in no acute distress.  SKIN: Clear. No significant rash, abnormal pigmentation or lesions  HEAD: Normocephalic.  EYES:  Symmetric light reflex. Normal conjunctivae.  EARS: Normal canals. Tympanic membranes are normal; gray and translucent.  NOSE: Normal without discharge.  MOUTH/THROAT: Clear. No oral lesions. Teeth without obvious abnormalities.  NECK: Supple, no masses.  No thyromegaly.  LYMPH NODES: No adenopathy  LUNGS: Clear. No rales, rhonchi, wheezing or retractions  HEART: Regular rhythm. Normal S1/S2. No murmurs. Normal pulses.  ABDOMEN: Soft, non-tender, not distended, no masses or hepatosplenomegaly. Bowel sounds normal.   GENITALIA: Normal male external genitalia. Jv stage I,  both testes descended, no hernia or hydrocele.    EXTREMITIES: Full range of motion, no deformities  NEUROLOGIC: No focal findings. Cranial nerves grossly intact: DTR's normal. Normal gait, strength and tone    ASSESSMENT/PLAN:       ICD-10-CM    1. Encounter for routine child health examination w/o abnormal findings  Z00.129 DTAP IMMUNIZATION (<7Y), IM [[id number]]     " HIB VACCINE, PRP-T, IM [[id number]]     PNEUMOCOCCAL CONJ VACCINE 13 VALENT IM [[id number]]       Anticipatory Guidance  The following topics were discussed:  SOCIAL/ FAMILY:    Stranger/ separation anxiety    Hitting/ biting/ aggressive behavior    Tantrums  NUTRITION:    Age-related decrease in appetite  HEALTH/ SAFETY:    Dental hygiene    Exploration/ climbing    Preventive Care Plan  Immunizations     See orders in EpicCare.  I reviewed the signs and symptoms of adverse effects and when to seek medical care if they should arise.  Referrals/Ongoing Specialty care: No   See other orders in EpicCare    Resources:  [place] Child and Teen Checkups (C&TC) Schedule of Age-Related Screening Standards    FOLLOW-UP:      18 month Preventive Care visit    Ruben Chung MD  [facility]   Ears: no ear pain and no hearing problems.Nose: no nasal congestion and no nasal drainage.Mouth/Throat: no dysphagia, no hoarseness and no throat pain.Neck: no lumps, no pain, no stiffness and no swollen glands.

## 2021-02-19 NOTE — CONSULT NOTE ADULT - SUBJECTIVE AND OBJECTIVE BOX
Lab Results   Component Value Date    HGBA1C 11 6 (H) 12/09/2020       Recent Labs     02/18/21 2127 02/18/21  2239 02/19/21  0748 02/19/21  1047   POCGLU 83 118 115 127       Blood Sugar Average: Last 72 hrs:  (P) 91 9     · BBG on the lower side, patient does not eating much due to dysphagia  · Continue Lantus 10 units + SSI  · Discontinue 3 units Humalog t i d  With meals  Patient is a 58y old  Male who presents with a chief complaint of Acute kidney injury, anemia (02 Jul 2019 07:20)    58 years old male with reported PMH of HTN, chronic back pain, chronic anemia, L1 burst fracture with paraplegia since 1986 (bedbound and wheelchair bound), PAD s/p recent BKA on 6/4, neurogenic bladder with suprapubic catheter (recently changed 3 days ago) presented with weakness and generalized pain. History was also obtained from patient's mother at bedside. Patient was found to be on the floor this morning by his mother. Patient states that he was trying to transfer himself from bed to his wheelchair but he fell on the floor. Patient denies head trauma or LOC. His mother later found him and called the ambulance. Patient was recently admitted to [facility] for RLE gangrene and had BKA done on 6/4. Patient was discharged on 6/21 to SNF initially but patient refused, so he was discharged home instead. Patient has fallen at home 3 times since his discharge. Patient reports weakness, bilateral shoulder pain, and chronic low back pain. Patient denies fever/chills, cough, chest pain, shortness of breath, abdominal pain, nausea/vomiting, diarrhea/constipation. Patient was found to be anemic with hemoglobin of 6 (baseline 8-9) and elevated creatinine of 4.8 (baseline 0.8 in 2018, 3.8 in 2019).    Endocrine consult called for episodes of hypoglycemia.    PAST MEDICAL & SURGICAL HISTORY:  Anemia  PAD (peripheral artery disease)  Hypertension  Suprapubic catheter  Pressure ulcer  Diabetes  Lumbar compression fracture  S/P below knee amputation, right  S/P debridement  Toe amputation status, right  H/O hernia repair      MEDICATIONS  (STANDING):  ALPRAZolam 2 milliGRAM(s) Oral at bedtime  chlorhexidine 4% Liquid 1 Application(s) Topical <User Schedule>  dextrose 5% + sodium chloride 0.45%. 1000 milliLiter(s) (75 mL/Hr) IV Continuous <Continuous>  dronabinol 2.5 milliGRAM(s) Oral two times a day  fludroCORTISONE 0.1 milliGRAM(s) Oral daily  gabapentin 200 milliGRAM(s) Oral daily  heparin  Injectable 5000 Unit(s) SubCutaneous every 8 hours  methadone    Tablet 80 milliGRAM(s) Oral every 6 hours  sodium bicarbonate 650 milliGRAM(s) Oral three times a day  testosterone 1% Gel 50 milliGRAM(s) Topical daily    MEDICATIONS  (PRN):  acetaminophen   Tablet .. 650 milliGRAM(s) Oral once PRN Moderate Pain (4 - 6)  diazepam    Tablet 10 milliGRAM(s) Oral two times a day PRN agitation          Vital Signs Last 24 Hrs  T(C): 35.6 (02 Jul 2019 05:27), Max: 36.7 (01 Jul 2019 13:41)  T(F): 96 (02 Jul 2019 05:27), Max: 98.1 (01 Jul 2019 13:41)  HR: 72 (02 Jul 2019 05:27) (72 - 84)  BP: 135/73 (02 Jul 2019 05:27) (118/63 - 135/73)  BP(mean): --  RR: 18 (02 Jul 2019 05:27) (18 - 18)  SpO2: 94% (01 Jul 2019 18:15) (94% - 94%)  CAPILLARY BLOOD GLUCOSE      POCT Blood Glucose.: 59 mg/dL (02 Jul 2019 08:21)  POCT Blood Glucose.: 54 mg/dL (02 Jul 2019 07:36)  POCT Blood Glucose.: 85 mg/dL (01 Jul 2019 21:05)  POCT Blood Glucose.: 92 mg/dL (01 Jul 2019 16:30)  POCT Blood Glucose.: 76 mg/dL (01 Jul 2019 11:13)    I&O's Summary    01 Jul 2019 07:01  -  02 Jul 2019 07:00  --------------------------------------------------------  IN: 0 mL / OUT: 400 mL / NET: -400 mL        Physical Exam:    -     General :     -      Cardiac:    -      Pulm:    -      GI:    -      Musculoskeletal:    -      Neuro:        Labs:                        7.5    10.56 )-----------( 344      ( 01 Jul 2019 21:43 )             25.0             07-01    138  |  110  |  45<H>  ----------------------------<  70  4.3   |  16<L>  |  4.9<HH>    Ca    7.6<L>      01 Jul 2019 21:43                CARDIAC MARKERS ( 30 Jun 2019 17:14 )  x     / x     / 20 U/L / x     / x                  Imaging:    ECG: Patient is a 58y old  Male who presents with a chief complaint of Acute kidney injury, anemia (02 Jul 2019 07:20)    58 years old male with reported PMH of HTN, chronic back pain, chronic anemia, L1 burst fracture with paraplegia since 1986 (bedbound and wheelchair bound), PAD s/p recent BKA on 6/4, neurogenic bladder with suprapubic catheter (recently changed 3 days ago) presented with weakness and generalized pain. Patient was found to be on the floor this morning by his mother. Patient states that he was trying to transfer himself from bed to his wheelchair but he fell on the floor. Patient denies head trauma or LOC. His mother later found him and called the ambulance. Patient was recently admitted to [facility] for RLE gangrene and had BKA done on 6/4. Patient was discharged on 6/21 to SNF initially but patient refused, so he was discharged home instead. Patient has fallen at home 3 times since his discharge. Patient reports weakness, bilateral shoulder pain, and chronic low back pain. Patient denies fever/chills, cough, chest pain, shortness of breath, abdominal pain, nausea/vomiting, diarrhea/constipation. Patient was found to be anemic with hemoglobin of 6 (baseline 8-9) and elevated creatinine of 4.8 (baseline 0.8 in 2018, 3.8 in 2019).    Endocrine consult called for episodes of hypoglycemia.  He was taking in Januvia and Metormin for 1 and half month but then stopped few months back as his blood sugar numbers were low.    PAST MEDICAL & SURGICAL HISTORY:  Anemia  PAD (peripheral artery disease)  Hypertension  Suprapubic catheter  Pressure ulcer  Diabetes  Lumbar compression fracture  S/P below knee amputation, right  S/P debridement  Toe amputation status, right  H/O hernia repair      MEDICATIONS  (STANDING):  ALPRAZolam 2 milliGRAM(s) Oral at bedtime  chlorhexidine 4% Liquid 1 Application(s) Topical <User Schedule>  dextrose 5% + sodium chloride 0.45%. 1000 milliLiter(s) (75 mL/Hr) IV Continuous <Continuous>  dronabinol 2.5 milliGRAM(s) Oral two times a day  fludroCORTISONE 0.1 milliGRAM(s) Oral daily  gabapentin 200 milliGRAM(s) Oral daily  heparin  Injectable 5000 Unit(s) SubCutaneous every 8 hours  methadone    Tablet 80 milliGRAM(s) Oral every 6 hours  sodium bicarbonate 650 milliGRAM(s) Oral three times a day  testosterone 1% Gel 50 milliGRAM(s) Topical daily    MEDICATIONS  (PRN):  acetaminophen   Tablet .. 650 milliGRAM(s) Oral once PRN Moderate Pain (4 - 6)  diazepam    Tablet 10 milliGRAM(s) Oral two times a day PRN agitation          Vital Signs Last 24 Hrs  T(C): 35.6 (02 Jul 2019 05:27), Max: 36.7 (01 Jul 2019 13:41)  T(F): 96 (02 Jul 2019 05:27), Max: 98.1 (01 Jul 2019 13:41)  HR: 72 (02 Jul 2019 05:27) (72 - 84)  BP: 135/73 (02 Jul 2019 05:27) (118/63 - 135/73)  BP(mean): --  RR: 18 (02 Jul 2019 05:27) (18 - 18)  SpO2: 94% (01 Jul 2019 18:15) (94% - 94%)  CAPILLARY BLOOD GLUCOSE      POCT Blood Glucose.: 59 mg/dL (02 Jul 2019 08:21)  POCT Blood Glucose.: 54 mg/dL (02 Jul 2019 07:36)  POCT Blood Glucose.: 85 mg/dL (01 Jul 2019 21:05)  POCT Blood Glucose.: 92 mg/dL (01 Jul 2019 16:30)  POCT Blood Glucose.: 76 mg/dL (01 Jul 2019 11:13)    I&O's Summary    01 Jul 2019 07:01  -  02 Jul 2019 07:00  --------------------------------------------------------  IN: 0 mL / OUT: 400 mL / NET: -400 mL        Physical Exam:    -     General : Lying in bed no acute distress    -      Cardiac: Regular rate and rhythm, no murmur    -      Pulm: b/l clear    -      GI: soft non tender, no organomegaly    -      Musculoskeletal: Right BKA    -      Neuro: AO x 3 , power 3/5 in B/l lower ext        Labs:                        7.5    10.56 )-----------( 344      ( 01 Jul 2019 21:43 )             25.0             07-01    138  |  110  |  45<H>  ----------------------------<  70  4.3   |  16<L>  |  4.9<HH>    Ca    7.6<L>      01 Jul 2019 21:43                CARDIAC MARKERS ( 30 Jun 2019 17:14 )  x     / x     / 20 U/L / x     / x                  Imaging:    ECG: Patient is a 58y old  Male who presents with a chief complaint of Acute kidney injury, anemia (02 Jul 2019 07:20)    58 years old male with reported PMH of HTN, chronic back pain, chronic anemia, L1 burst fracture with paraplegia since 1986 (bedbound and wheelchair bound), PAD s/p recent BKA on 6/4, neurogenic bladder with suprapubic catheter (recently changed 3 days ago) presented with weakness and generalized pain. Patient was found to be on the floor this morning by his mother. Patient states that he was trying to transfer himself from bed to his wheelchair but he fell on the floor. Patient denies head trauma or LOC. His mother later found him and called the ambulance. Patient was recently admitted to [facility] for RLE gangrene and had BKA done on 6/4. Patient was discharged on 6/21 to SNF initially but patient refused, so he was discharged home instead. Patient has fallen at home 3 times since his discharge. Patient reports weakness, bilateral shoulder pain, and chronic low back pain. Patient denies fever/chills, cough, chest pain, shortness of breath, abdominal pain, nausea/vomiting, diarrhea/constipation. Patient was found to be anemic with hemoglobin of 6 (baseline 8-9) and elevated creatinine of 4.8 (baseline 0.8 in 2018, 3.8 in 2019).    Endocrine consult called for episodes of hypoglycemia.  He was taking in Januvia and Metormin for 1 and half month but then stopped few months back as his blood sugar numbers were low.    PAST MEDICAL & SURGICAL HISTORY:  Anemia  PAD (peripheral artery disease)  Hypertension  Suprapubic catheter  Pressure ulcer  Diabetes  Lumbar compression fracture  S/P below knee amputation, right  S/P debridement  Toe amputation status, right  H/O hernia repair      MEDICATIONS  (STANDING):  ALPRAZolam 2 milliGRAM(s) Oral at bedtime  chlorhexidine 4% Liquid 1 Application(s) Topical <User Schedule>  dextrose 5% + sodium chloride 0.45%. 1000 milliLiter(s) (75 mL/Hr) IV Continuous <Continuous>  dronabinol 2.5 milliGRAM(s) Oral two times a day  fludroCORTISONE 0.1 milliGRAM(s) Oral daily  gabapentin 200 milliGRAM(s) Oral daily  heparin  Injectable 5000 Unit(s) SubCutaneous every 8 hours  methadone    Tablet 80 milliGRAM(s) Oral every 6 hours  sodium bicarbonate 650 milliGRAM(s) Oral three times a day  testosterone 1% Gel 50 milliGRAM(s) Topical daily    MEDICATIONS  (PRN):  acetaminophen   Tablet .. 650 milliGRAM(s) Oral once PRN Moderate Pain (4 - 6)  diazepam    Tablet 10 milliGRAM(s) Oral two times a day PRN agitation          Vital Signs Last 24 Hrs  T(C): 35.6 (02 Jul 2019 05:27), Max: 36.7 (01 Jul 2019 13:41)  T(F): 96 (02 Jul 2019 05:27), Max: 98.1 (01 Jul 2019 13:41)  HR: 72 (02 Jul 2019 05:27) (72 - 84)  BP: 135/73 (02 Jul 2019 05:27) (118/63 - 135/73)  BP(mean): --  RR: 18 (02 Jul 2019 05:27) (18 - 18)  SpO2: 94% (01 Jul 2019 18:15) (94% - 94%)  CAPILLARY BLOOD GLUCOSE      POCT Blood Glucose.: 59 mg/dL (02 Jul 2019 08:21)  POCT Blood Glucose.: 54 mg/dL (02 Jul 2019 07:36)  POCT Blood Glucose.: 85 mg/dL (01 Jul 2019 21:05)  POCT Blood Glucose.: 92 mg/dL (01 Jul 2019 16:30)  POCT Blood Glucose.: 76 mg/dL (01 Jul 2019 11:13)    I&O's Summary    01 Jul 2019 07:01  -  02 Jul 2019 07:00  --------------------------------------------------------  IN: 0 mL / OUT: 400 mL / NET: -400 mL        Physical Exam:    -     General : Lying in bed no acute distress    -      Cardiac: Regular rate and rhythm, no murmur; trace LLE edema    -      Pulm: b/l clear    -      GI: soft non tender, no organomegaly    -      Musculoskeletal: Right BKA    -      Neuro: AO x 3 , power 3/5 in B/l lower ext        Labs:                        7.5    10.56 )-----------( 344      ( 01 Jul 2019 21:43 )             25.0             07-01    138  |  110  |  45<H>  ----------------------------<  70  4.3   |  16<L>  |  4.9<HH>    Ca    7.6<L>      01 Jul 2019 21:43                CARDIAC MARKERS ( 30 Jun 2019 17:14 )  x     / x     / 20 U/L / x     / x                  Imaging:    ECG:

## 2021-05-06 NOTE — BEHAVIORAL HEALTH ASSESSMENT NOTE - NS ED BHA MSE SPEECH SPONTANEITY
Results reported to patient--grossly benign, US shows fibroids, labs shows low potassium (replaced), hgb of 11 is stable   Pt. reports feeling better after meds  pt. agrees to f/u with primary care outpt. as well as ob/gyn asap  pt. understands to return to ED if symptoms worsen; will d/c with copy of results Normal

## 2021-09-16 NOTE — BEHAVIORAL HEALTH ASSESSMENT NOTE - LANGUAGE
19.2 History & Physical - OB/GYN   Sara Brennan 31 y o  female MRN: [id number]  Unit/Bed#: LD TRIAGE 2- Encounter: [id number]    She is a patient of [facility]    Chief complaint: Teresa Sanders my water and painful contractions    HPI: 31 y o  Anuel Rodriguez at 38w4d by LMP who presents for spontaneous rupture of membranes at 8:30pm and painful contractions  The patient has A2gDM for which she takes Lantus 100 units  EFW is 86% with an AC>97%  Contractions:  yes  Fetal movement:  yes  Vaginal bleeding:   no  Leaking of fluid:  yes    Pregnancy Complications:  Patient Active Problem List   Diagnosis    Obesity affecting pregnancy in third trimester    Depression during pregnancy in third trimester    Insulin controlled gestational diabetes mellitus (GDM) in third trimester    BMI 35 0-35 9,adult    Hyperglycemia in pregnancy    Macrosomia affecting management of mother in third trimester       PMH:  Past Medical History:   Diagnosis Date    Depression     Diabetes, gestational        PSH:  Past Surgical History:   Procedure Laterality Date    EAR TUBE REMOVAL      WISDOM TOOTH EXTRACTION      Age 14     Social Hx:  Smoking: none  Alcohol use in pregnancy: none  Illicit drug use: none    OB Hx:  OB History    Para Term  AB Living   2 1 1 0 0 1   SAB TAB Ectopic Multiple Live Births   0 0 0 0 1      # Outcome Date GA Lbr Garry/2nd Weight Sex Delivery Anes PTL Lv   2 Current            1 Term 02/16 40w2d 10:15 / 00:15 3565 g (7 lb 13 8 oz) F Vag-Spont Local N RADHA      Apgar1: 9  Apgar5: 9      Obstetric Comments   Menstrual cycle: 15       Meds:  No current facility-administered medications on file prior to encounter       Current Outpatient Medications on File Prior to Encounter   Medication Sig Dispense Refill    Blood Glucose Monitoring Suppl (OneTouch Verio Flex System) w/Device KIT Test 4 times daily 1 kit 0    insulin glargine (Lantus SoloStar) 100 units/mL injection pen Inject 30 Units under the skin daily at bedtime At 9 PM daily  To be titrated  GDM  15 mL 0    Insulin Pen Needle 31G X 5 MM MISC Inject under the skin daily at bedtime Use one a day or as directed  100 each 1    OneTouch Delica Lancets 59R MISC Test 4 times daily 100 each 4    OneTouch Verio test strip USE TO TEST 4 TIMES A  strip 4    Prenatal Multivit-Min-Fe-FA (PRE- FORMULA PO) Take by mouth          Allergies:  No Known Allergies    Labs:  Blood type: O+  Antibody: negative  Group B strep: negative  HIV: negative  Hepatitis B: negative  RPR: NR  Rubella: Immune  Varicella Immune    Physical Exam:  LMP 2020 (Exact Date)     Physical Exam  Constitutional:       Appearance: She is well-developed  HENT:      Head: Normocephalic and atraumatic  Cardiovascular:      Rate and Rhythm: Normal rate and regular rhythm  Heart sounds: Normal heart sounds  No murmur heard  No gallop  Pulmonary:      Effort: Pulmonary effort is normal  No respiratory distress  Breath sounds: Normal breath sounds  No stridor  No wheezing or rales  Abdominal:      General: There is distension  Palpations: Abdomen is soft  There is no mass  Tenderness: There is no abdominal tenderness  There is no guarding  Musculoskeletal:         General: No tenderness or deformity  Cervical back: Normal range of motion  Right lower leg: No edema  Left lower leg: No edema  Skin:     General: Skin is warm and dry  Neurological:      Mental Status: She is alert and oriented to person, place, and time  Psychiatric:         Mood and Affect: Mood normal          Behavior: Behavior normal          Thought Content:  Thought content normal          Judgment: Judgment normal        Estimated Fetal Weight: 9 lbs  Presentation: vertex     SVE: 5/60%/ -2  FHT:  145 / Moderate 6 - 25 bpm / accelerations, no decelerations  Aceitunas: q2     Membranes: spontaneous rupture at 8:30    Assessment:   31 y o   at 38w4d who presents with spontaneous rupture of membranes  Plan:   1  Admit to L&D  2  CBC, RPR, type and screen  3  GBS negative   4  LR @125cc/hour   Epidural declined     Discussed with Dr Severo Simms  No abnormalities noted

## 2021-12-19 NOTE — PROGRESS NOTE ADULT - ASSESSMENT
59 years old male known to have PVD s/p right side AKA and left below knee amputation, paraplegia with multiple chronic bilateral buttock and sacral stage 4 ulcers, suprapubic catheter, HTN (not on any medication), CKD stage IV (not following with nephrology), hypothyroidism, opioid dependance, recent admission for cold left foot (chronic left foot dry gangrene, h/o esophageal candidiasis was sent to the rehab due to worsening kidney functions and hypoglycemia with FS 45.    In the ED, pt remained hemodynamically stable but looks very pale, no active bleeding, lab workup showed leukocytosis of 19, normo anemia with Hb 7.7 (vbg 8.7), potassium 6.5 (hemolyzed) repeat 5.2, anion gap metabolic acidosis, worsening CKD 4 with creat 4.1 (baseline 3.5), , cxr ok.    # CKD 4:  - Creatinine stable around 4.1 , no indication for RRT  # Hyperkalemia 6.5 > 5.2-->5.3, stable around 5.3 . Trend BMP in am  # Metabolic acidosis: Non anion gap metabolic acidosis, bicarb 13, non worsening.  - increased sodium bicarb to 1300 q 8  f/u  IPTH, iron studies  Pt non oliguric    # Leukocytosis - WBC 13,000  - Patient has sacral ulcers that look infected  - F/up Burn consult  - C/w vanc and cefepime. 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 F/up TSH. If still elevated, will increase to 125 mcg    # h/o esophageal candidiasis  cw fluconazole  outpt GI fu    # opioid dependance  cw methadone, f/u qtc  outpt clinic fu on discharge    # HTN (not on any medication)    # suspected folate / vit D / Vit b12 DEF  Fu level and start if needed    # Pt looks malnourished  - Dietician qiana noted    DVT PPX: heparin  GI PPx: not indicated  Dispo: Requesting home services, will anticipate for tomorrow  Diet: dash  Activity: OOBTC no

## 2022-01-24 NOTE — GOALS OF CARE CONVERSATION - ADVANCED CARE PLANNING - NS PRO AD PATIENT TYPE
Physical Therapy Daily Progress Note    Patient: Sourav Proctor   : 1954  Diagnosis/ICD-10 Code:  Cervicalgia [M54.2]  Referring practitioner: Celestine Payne PA-C  Date of Initial Visit: Type: THERAPY  Noted: 1/10/2022  Today's Date: 2022  Patient seen for 4 sessions         Sourav Proctor reports:  Cervical spine feeling better, however having some stiffness in B UT today after working today.  Reports he was in forward leaning position for long periods of time today.     Objective   See Exercise, Manual, and Modality Logs for complete treatment.       Assessment/Plan     Noted hypomobility in upper thoracic spine, as well as in B OA joint.  Patient tolerates manual therapy well.  Patient also demonstrates proper technique with newly added scapular stabilization exercise.     Progress per Plan of Care           Manual Therapy:    30     mins  13572;  Therapeutic Exercise:    10     mins  55104;     Neuromuscular Margot:        mins  61686;    Therapeutic Activity:          mins  70761;     Gait Training:           mins  74416;     Ultrasound:          mins  71019;    Electrical Stimulation:         mins  46863 ( );  Dry Needling          mins self-pay    Timed Treatment:   40   mins   Total Treatment:     40   mins    Jasvir Queen PT  Physical Therapist                       Medical Orders for Life-Sustaining Treatment (MOLST)/Do Not Resuscitate (DNR)

## 2022-02-25 NOTE — CONSULT NOTE ADULT - MINUTES
We can do a phone call to review birth control as we previously discussed at her well visit 8/31/2021. Looks like March 23rd may work in my schedule if that works for them.    45

## 2022-03-16 NOTE — DISCHARGE NOTE ADULT - PATIENT PORTAL LINK FT
You can access the [facility] Patient Portal, offered by [facility], by registering with the following website: [email or web address]

## 2022-07-28 NOTE — DIETITIAN INITIAL EVALUATION ADULT. - ADD RECOMMEND
Order MVI w/o minerals daily. Finasteride Pregnancy And Lactation Text: This medication is absolutely contraindicated during pregnancy. It is unknown if it is excreted in breast milk.

## 2022-08-31 NOTE — ED PROVIDER NOTE - NSTIMEPROVIDERCAREINITIATE_GEN_ER
FMLA or Disability Forms were received and faxed to the Forms Completion Department today at [phone].  (Please include all appropriate authorization forms with your fax)    Did you have the patient complete (in full) and sign the \"Authorization for Disclosure of Health Information Forms Completion\" form?  YES ________  (If no, please give reason)    Have you communicated that the Forms Completion Process may take up to 14 days?  YES _________    If you have questions about this encounter, please contact the Forms Completion Dept. at [phone].         15-Aug-2018 08:53

## 2022-09-15 NOTE — PATIENT PROFILE ADULT - ABILITY TO HEAR (WITH HEARING AID OR HEARING APPLIANCE IF NORMALLY USED):
Onset: Yesterday.   Location / description: New symptom of - it is hard to take a deep breath. History of Emphysema which is getting worse. Spiriva medication questions. Denies to be struggling to take a deep breath. Denies SOB.  Requesting referral with pulmonologist.   Precipitating Factors: as above  Pain Scale (1-10), 10 highest: n/a  Associated Symptoms: History of Emphysema  What improves/worsens symptoms: Nothing  Symptom specific medications: Spiriva  LMP : Patient's last menstrual period was 03/25/2013.  Are you pregnant or breast feeding: n/a  Recent visits (last 3-4 weeks) for same reason or recent surgery:  Yes 9/7/22    PLAN:  See Provider or Urgent care within next 4 hours Patient warm transferred to office to schedule an appointment with available provider - pcp is all booked. Patient instructed to call back or go to ED if gets worse.    Patient/Caller agrees to follow recommendations.    Reason for Disposition  • [1] Longstanding difficulty breathing (e.g., CHF, COPD, emphysema) AND [2] WORSE than normal    Protocols used: BREATHING DIFFICULTY-A-AH       Adequate: hears normal conversation without difficulty

## 2022-11-18 NOTE — PROGRESS NOTE ADULT - SUBJECTIVE AND OBJECTIVE BOX
Nephrology progress note    Patient was seen and examined, events over the last 24 h noted .  Going to OR for BKa washout  Cr stable    Allergies:  sulfamethizole (Unknown)    Hospital Medications:   MEDICATIONS  (STANDING):  ALPRAZolam 2 milliGRAM(s) Oral at bedtime  cefTRIAXone   IVPB 2 Gram(s) IV Intermittent every 24 hours  chlorhexidine 4% Liquid 1 Application(s) Topical <User Schedule>  Dakins Solution - Full Strength 1 Application(s) Topical every 12 hours  dextrose 5% + sodium chloride 0.45%. 1000 milliLiter(s) (50 mL/Hr) IV Continuous <Continuous>  diazepam    Tablet 10 milliGRAM(s) Oral two times a day  docusate sodium 100 milliGRAM(s) Oral three times a day  dronabinol 2.5 milliGRAM(s) Oral daily  heparin  Injectable 5000 Unit(s) SubCutaneous every 8 hours  insulin lispro (HumaLOG) corrective regimen sliding scale   SubCutaneous three times a day before meals  magnesium sulfate  IVPB 2 Gram(s) IV Intermittent once  methadone    Tablet 10 milliGRAM(s) Oral daily  metroNIDAZOLE  IVPB 500 milliGRAM(s) IV Intermittent every 8 hours  oxybutynin 5 milliGRAM(s) Oral daily  pantoprazole    Tablet 40 milliGRAM(s) Oral before breakfast  sevelamer carbonate 1600 milliGRAM(s) Oral three times a day with meals  sodium bicarbonate 650 milliGRAM(s) Oral three times a day        VITALS:  T(F): 96.3 (06-13-19 @ 06:46), Max: 97.4 (06-12-19 @ 13:31)  HR: 84 (06-13-19 @ 09:51)  BP: 99/56 (06-13-19 @ 09:51)  RR: 18 (06-13-19 @ 09:51)  SpO2: --  Wt(kg): --    06-11 @ 07:01  -  06-12 @ 07:00  --------------------------------------------------------  IN: 0 mL / OUT: 2075 mL / NET: -2075 mL    06-12 @ 07:01  -  06-13 @ 07:00  --------------------------------------------------------  IN: 0 mL / OUT: 1450 mL / NET: -1450 mL      Height (cm): 185.42 (06-13 @ 09:51)  Weight (kg): 70.8 (06-13 @ 09:51)  BMI (kg/m2): 20.6 (06-13 @ 09:51)  BSA (m2): 1.94 (06-13 @ 09:51)    PHYSICAL EXAM:  Constitutional: NAD  HEENT: anicteric sclera, oropharynx clear, MMM  Neck: No JVD  Respiratory: CTAB, no wheezes, rales or rhonchi  Cardiovascular: S1, S2, RRR  Gastrointestinal: BS+, soft, NT/ND  Extremities: No cyanosis or clubbing. right BKA   :  No brown.   Skin: No rashes      LABS:  06-13    139  |  109  |  41<H>  ----------------------------<  106<H>  4.7   |  18  |  3.9<H>    Ca    7.4<L>      13 Jun 2019 08:15  Phos  6.4     06-13  Mg     1.2     06-13                            9.6    10.08 )-----------( 407      ( 13 Jun 2019 08:15 )             30.9       Urine Studies:      RADIOLOGY & ADDITIONAL STUDIES: engaged in therapy previous engagement in therapy at age 12 y/o and 5 y/o. previous engagement in therapy at age 10 y/o and 5 y/o.  no formal diagnoses

## 2022-11-29 NOTE — PATIENT PROFILE ADULT - NSPROGENPREVTRANSF_GEN_A_NUR
S/p 1.  EGD. 2.  Percutaneous endoscopic gastrostomy tube placement. 3.  Right subclavian vein MediPort placement. 4.  Intraoperative fluoroscopy-11/21/2022    Patient calling in states PEG tube area is red, having thick white drainage around tube-[facility] in [facility] They placed her on Clindamycin yesterday. States she has been having chills. Has not taken temperature. no

## 2022-12-13 NOTE — ED ADULT TRIAGE NOTE - NS ED NURSE AMBULANCES
[facility] Tissue Cultured Epidermal Autograft Text: The defect edges were debeveled with a #15 scalpel blade.  Given the location of the defect, shape of the defect and the proximity to free margins a tissue cultured epidermal autograft was deemed most appropriate.  The graft was then trimmed to fit the size of the defect.  The graft was then placed in the primary defect and oriented appropriately.

## 2023-02-03 NOTE — H&P ADULT - NSHPPOADEEPVENOUSTHROMB_GEN_A_CORE
no
Keep your intake of vitamin K regular. The highest amount of vitamin K is found in green and leafy vegetables like broccoli, lettuces, cabbage, and spinach. You can eat these foods but keep the portion size the same. Changes in the amount you eat can affect your PT/INR blood test. Contact your doctor before making any major changes in your diet. Limit your alcohol intake.

## 2023-03-09 NOTE — PROGRESS NOTE ADULT - NEUROLOGICAL
[FreeTextEntry1] : Follow up\par \par Richard is 31 y/o RH with epilepsy.\par \par HPI\par Not seen x 3 years\par Reports no seizures\par Feeling well\par No issues\par \par IGE diagnosis. DANIA\par \par Meds\par \par  mg XR\par \par \par No complaints\par No medical issues since last seen \par Sleeps well.  \par \par last GTC sz in 2012\par \par Delta \par  detailed exam

## 2023-03-27 NOTE — PRE-OP CHECKLIST - LATEX ALLERGY
[FreeTextEntry1] : 79-year-old female with much improved neck pain following a cervical epidural steroid injection. As for her lower back pain, she has new weakness in the lower extremities. I am concerned there may be new disc pathology. I will obtain a new MRI of the lumbar spine to rule out any new sinister pathology. Follow up in 6 weeks will be made for reassessment. She will continue to follow up with her other doctors. I have explained the findings to the patient and all questions have been answered. \par \par MRI of the lumbar spine was ordered to delineate spine pathology such as disc displacement and stenosis. \par \par Overall there is at least a 30-50% reduction in pain with the prescribed analgesics. The patient denies any adverse side effects due to the medication (sleeping disturbance, constipation, sleepiness, hallucinations and/or urination problems).\par \par Entered by Emilie Benitez, acting as scribe for Dr. Bush.\par  \par The documentation recorded by the scribe, in my presence, accurately reflects the service I personally performed, and the decisions made by me with my edits as appropriate.\par  \par Best Regards, \par Luis Bush MD \par Board Certified, Anesthesiology \par Board Certified, Pain Medicine\par  \par 
no

## 2023-04-10 NOTE — PATIENT PROFILE ADULT - NSPROPTRIGHTNOTIFY_GEN_A_NUR
[Uterine Prolapse] : mild [Rectal Prolapse] : mild [Unable To Restrain Bowel Movement] : moderate [Feelings Of Urinary Urgency] : mild [Pain During Urination (Dysuria)] : no [Urinary Tract Infection] : moderate [Hematuria] : moderate [Urinary Frequency More Than Twice At Night (Nocturia)] : no nocturia [Urinary Frequency] : no [Constipation Obstructed Defecation] : mild [Incomplete Emptying Of Stool] : no [] : mild [Stool Visible Blood] : mild [FreeTextEntry3] : occasional splinting to have BM [FreeTextEntry5] : daily [de-identified] : 1 [de-identified] : daily, over the last year has progressed to large volume losses, cannot stop  [de-identified] : daily declines [de-identified] : 8x [de-identified] : daily [FreeTextEntry9] : within 20min [de-identified] : post void dribbling with urination [de-identified] : occasional splinting  [de-identified] : sexually active, dryness [FreeTextEntry1] : On last gyn visit, cystocele and 3cm polyp noted on exam. \par \par PMHx: breast cancer (s/p lumpectomy and radiation)\par PSHx: lumpectomy 2021, ex lap with salpingectomy (unknown side) 2/2 ectopic pregnancy \par \par Daily fluid intake: 20 oz diet soda, water 36 oz, rare alcohol\par

## 2023-04-20 NOTE — DIETITIAN INITIAL EVALUATION ADULT. - PERTINENT LABORATORY DATA
LVM again for patient asking for a returned call to discuss medication cost for Entresto and Jardiance    6/6: H/H: 7.9/25.2.; Na 137, K 4.7, BUN 55, Cr 4.0, Glucose 53, phos 6.6

## 2023-05-13 NOTE — CONSULT NOTE ADULT - CONSULT REQUESTED BY NAME
Patient sleeping with adequate respirations - no apparent discomfort or distress  Did not awaken patient at this time for vital signs as patient had been medicated for sleep earlier in shift and vital signs and behavioral health assessment were stable at that time       Gissell Hargrove RN  05/13/23 [id number] medicine team

## 2023-05-26 NOTE — PATIENT PROFILE ADULT - DOES PATIENT HAVE ADVANCE DIRECTIVE
Malnutrition Severity Assessment      Patient meets criteria for : Severe Malnutrition  Malnutrition Type (last 8 hours)     Malnutrition Severity Assessment     Row Name 05/26/23 1222       Malnutrition Severity Assessment    Malnutrition Type Chronic Disease - Related Malnutrition    Row Name 05/26/23 1222       Insufficient Energy Intake     Insufficient Energy Intake Findings Severe    Insufficient Energy Intake  <75% of est. energy requirement for > or equal to 1 month    Row Name 05/26/23 1223       Unintentional Weight Loss     Unintentional Weight Loss Findings None    Row Name 05/26/23 1223       Muscle Loss    Loss of Muscle Mass Findings Severe    [place] Region Severe - deep hollowing/scooping, lack of muscle to touch, facial bones well defined    Clavicle Bone Region Severe - protruding prominent bone    Acromion Bone Region Severe - squared shoulders, bones, and acromion process protrusion prominent    Scapular Bone Region Severe - prominent bones, depressions easily visible between ribs, scapula, spine, shoulders    Dorsal Hand Region Severe - prominent depression    Patellar Region Severe - prominent bone, square looking, very little muscle definition    Anterior Thigh Region Severe - line/depression along thigh, obviously thin    Posterior Calf Region Severe - thin with very little definition/firmness    Row Name 05/26/23 1223       Fat Loss    Subcutaneous Fat Loss Findings Severe    Orbital Region  Severe - pronounced hollowness/depression, dark circles, loose saggy skin    Upper Arm Region Severe - mostly skin, very little space between folds, fingers touch    Thoracic & Lumbar Region Severe - ribs visible with prominent depressions, iliac crest very prominent    Row Name 05/26/23 1223       Fluid Accumulation (Edema)    Fluid Acumulation Findings Mild    Row Name 05/26/23 1223       Criteria Met (Must meet criteria for severity in at least 2 of these categories: M Wasting, Fat Loss, Fluid,  Secondary Signs, Wt. Status, Intake)    Patient meets criteria for  Severe Malnutrition                    no yes

## 2023-09-04 NOTE — PROGRESS NOTE ADULT - ASSESSMENT
Assumed care of patient from BHAVANI Zabala. Patient remains in paper scrubs, PEC at 's desk. Patient belongings in secured closet.   Mr. Booker is a 58 yo male patient with PMHx of PVD s/p Right AKA months ago, paraplegia with multiple chronic bedsores and suprapubic catheter, HTN (not on any medication), CKD stage IV (not following with nephrology), hypothyroidism, opioid dependance, recent admission due to unresponsiveness, presents for 3-4 days of left foot coldness, pain and worsening ulcers.    # Chronic left foot peripheral artery disease + dry gangrene  - S/p BKA on 12/2/2019  - Closure was canceled on 12/5/2019 due to hypoglycemia, now scheduled for 12/16/2019.  - will keep on maintenance dextrose fluids, please hold off if O2 sat <92 percent  - patient can have surgical closure as long as he is receiving dextrose infusion tx as per attending  - ABx till closure of BKA. 48h post closure could hold all ABX    # Hypoglycemia  - hypoglycemia any-operatively  - on D5 NS 50 cc/hour, pt with low baseline po intake  - Hypoglycemia workup: Insulin low, C pep normal, B-hydroxybutyrate normal,   - Endo consulted - Etiology remarkably malnourished (low albumin, vitamin D, iron) Vs Methadone induced Vs adrenal insufficiency - severe hypothyroidism , less likely ? adrenal insufficiency, ?? pituitary tumor - MR brain ordered  - Nutrition conuslt - malnourishment cannot induce hypoglycemia - on merinol now   - Standard High dose adrenal insufficiency (250mcg) test performed. waiting for result, MRI pitutary ordered    # Poor PO intake, difficulty swallowing  - dysphagia is not pharyngeal  - pt not a candidate for Barium esophagram per Radiology  - cannot tolerate PO contrast without projectile vomiting  - GI: Pt would benefits from EGD. After hypoglycemia corrected and after surgery, will do Outpatient  - added marinol, will make PPI BID, colonoscopy as outpatient    # Hypothyroidism  - will need to re-check TSH am  - antibody work up sent: - thyroglobulin and thyroperoxidase negative thus far  - Thyroid ultrasounds: negative  - levothyroxine to 100 mcg daily    # Sacral OM and gangrene  - made Valium PRN due to low bp  - vancomycin on hold for high vanc trough  - Flagyl 500 mg iv q8h  - Rocephin  - ABx till closure of BKA. 48h post closure could hold all     # SILVINA on CKD + HAGMA  - non- oliguric  - patient with significant decrease in GFR in view of low muscle mass ( amputations)  - increase  sodium bicarbonate po  to q 8   - patient is refusing hd , no acute   - can increase if Anion gap increases  - h/h noted, no need for venofer sat in the 30 range, no need for LINDY will start once h <9  - f/u Phos, f/u Mag, f/u bmp    # Normocytic anemia  - Iron deficiency and CKD,  multifactorial  - s/p 1 unit pRBC on 11/27  - Patient denies any melena, or hematochezia, noted history of hemorrhoids  - Will trend CBC    # HTN  - not on any medication  - off Amlodipine  Echo results 55-60% , grade 1 pritesh dysfunction    # Opioid dependance  - Patient was on methadone 80 mg QID + Oxycodone 60mg QID  - was oversedated on admission, awake after narcan  - high risk for overdose given CKD  - Patient is malingering to gain access to higher doses of oxycodone, which has side effects and contraindicated in his current condition  - Once his stump will heal, he should f/up at pain management clinic to be tapered off methadone and oxycodone tx.  - Pain management consulted: Methadone 80mg q8, Oxycodone IR 50 q6 PRN, Tylenol 50 q6, Amitriptyline 25mg qHS for now    # Folate acid deficiency  - on folic acid po qd    # Vitamin D def  - 4000 qd daily    # Chronic sacral bedsore.  - Followed by Dr. Gooden

## 2023-10-26 NOTE — ED PROVIDER NOTE - GASTROINTESTINAL, MLM
HISTORY    The patient is a 60 year old female who comes in with continued right arm pain.  She saw Dr. Moore who recommended PT but she declined that the time.  She complains of pain over her biceps and lateral elbow.  It hurts to turn a doorknob.  She has an appointment to see another orthopedic surgeon next week for a second opinion.  She also complains of chronic left shoulder pain and decreased range of motion.  She did undergo left rotator cuff repair in 2014 followed by left shoulder surgery 6 months later for a frozen shoulder.    ALLERGIES:  No Known Allergies    Outpatient Medications Marked as Taking for the 10/26/23 encounter (Office Visit) with Malika Platt MD   Medication Sig Dispense Refill   • phentermine (ADIPEX-P) 37.5 MG tablet Take 1 tablet by mouth daily (before breakfast). 30 tablet 2   • omeprazole (PriLOSEC) 40 MG capsule Take 1 capsule by mouth daily. 90 capsule 3   • docusate sodium-sennosides (SENOKOT S) 50-8.6 MG per tablet Take 2 tablets by mouth nightly as needed for Constipation. 30 tablet 0   • naLOXone (NARCAN) 4 MG/0.1ML nasal liquid Spray the content of 1 device into 1 nostril. Call 911. May repeat with 2nd device in alternate nostril if no response in 2-3 minutes. 2 each 0   • polyethylene glycol (MiraLax) 17 GM/SCOOP powder Take 1 capful in 8 ounces of water or juice daily as needed for constipation. 578 g 11   • albuterol 108 (90 Base) MCG/ACT inhaler Inhale 2 puffs into the lungs every 4 hours as needed for Shortness of Breath or Wheezing. 1 each 1   • diclofenac (VOLTAREN) 1 % gel Apply 4 g topically 4 times daily. 350 g 3         Tobacco Use: Quit          Packs/Day:       Years:         REVIEW OF SYSTEMS:  All Review of Systems negative except as above in the history of present illness.    PHYSICAL EXAM  Vitals:  Blood pressure 134/86, pulse 92, temperature 98.5 °F (36.9 °C), resp. rate 16, height 5' 4.5\" (1.638 m), weight 87.5 kg (193 lb), last menstrual period  01/01/2006.   Wt Readings from Last 3 Encounters:   10/26/23 87.5 kg (193 lb)   09/26/23 89.4 kg (197 lb)   09/11/23 88.5 kg (195 lb)     General:  Well-nourished, well-developed, in no acute distress.  Right upper extremity:  No tenderness over the insertion of the biceps tendon but mild tenderness over the muscle belly; very tender over the right lateral epicondyle; pain is reproduced with resisted wrist extension.  Left shoulder:  Moderate decrease in elevation.  Skin:  Warm and dry; no rashes.     ASSESSMENT/PLAN    Blaire was seen today for follow-up.    Diagnoses and all orders for this visit:    Lateral epicondylitis of right elbow  -     SERVICE TO PHYSICAL THERAPY   Tennis elbow strap.    Chronic left shoulder pain  -     SERVICE TO PHYSICAL THERAPY    Need for vaccination  -     INFLUENZA QUADRIVALENT SPLIT PRES FREE 0.5 ML VACC, IM (FLULAVAL,FLUARIX,FLUZONE)    Malika Platt MD   Abdomen soft, non-tender, non distended, no rebound, no rigidity, no guarding.

## 2023-12-22 NOTE — ED BEHAVIORAL HEALTH ASSESSMENT NOTE - NS ED BHA DEMOGRAPHICS MEDICAL RECORD REVIEWED YES RECORDS
Hospital chart
12-22    135  |  105  |  60<H>  ----------------------------<  81  4.9   |  19<L>  |  7.86<H>    Ca    8.6      22 Dec 2023 06:54    TPro  5.6<L>  /  Alb  2.5<L>  /  TBili  0.6  /  DBili  x   /  AST  16  /  ALT  11  /  AlkPhos  69  12-22

## 2024-01-01 NOTE — ED ADULT TRIAGE NOTE - BP NONINVASIVE DIASTOLIC (MM HG)
May give 3.8 mL of children's tylenol (160mg/5mL) every 6 hours as needed for fever (T>100.4) or fussiness.     Patient Education     Well Child Exam 6 Months   About this topic   Your baby's 6-month well child exam is a visit with the doctor to check your baby's health. The doctor measures your baby's weight, height, and head size. The doctor plots these numbers on a growth curve. The growth curve gives a picture of your baby's growth at each visit. The doctor may listen to your baby's heart, lungs, and belly. Your doctor will do a full exam of your baby from the head to the toes.  Your baby may also need shots or blood tests during this visit.  General   Growth and Development   Your doctor will ask you how your baby is developing. The doctor will focus on the skills that most children your baby's age are expected to do. During the first months of your baby's life, here are some things you can expect.  Movement ? Your baby may:  Begin to sit up without help  Move a toy from one hand to the other  Roll from front to back and back to front  Use the legs to stand with your help  Be able to move forward or backward while on the belly  Become more mobile  Put everything in the mouth  Never leave small objects within reach.  Do not feed your baby hot dogs or hard food that could lead to choking.  Cut all food into small pieces.  Learn what to do if your baby chokes.  Hearing, seeing, and talking ? Your baby will likely:  Make lots of babbling noises  May say things like da-da-da or ba-ba-ba or ma-ma-ma  Show a wide range of emotions on the face  Be more comfortable with familiar people and toys  Respond to their own name  Likes to look at self in mirror  Feeding ? Your baby:  Takes breast milk or formula for most nutrition. Always hold your baby when feeding. Do not prop a bottle. Propping the bottle makes it easier for your baby to choke and get ear infections.  May be ready to start eating cereal and other baby foods.  Signs your baby is ready are when your baby:  Sits without much support  Has good head and neck control  Shows interest in food you are eating  Opens the mouth for a spoon  Able to grasp and bring things up to mouth  Can start to eat thin cereal or pureed meats. Then, add fruits and vegetables.  Do not add cereal to your baby's bottle. Feed it to your baby with a spoon.  Do not force your baby to eat baby foods. You may have to offer a food more than 10 times before your baby will like it.  It is OK to try giving your baby very small bites of soft finger foods like bananas or well cooked vegetables. If your baby coughs or chokes, then try again another time.  Watch for signs your baby is full like turning the head or leaning back.  May start to have teeth. If so, brush them 2 times each day with a smear of toothpaste. Use a cold clean wash cloth or teething ring to help ease sore gums.  Will need you to clean the teeth after a feeding with a wet washcloth or a wet baby toothbrush. You may use a smear of toothpaste each day.  Sleep ? Your baby:  Should still sleep in a safe crib, on the back, alone for naps and at night. Keep soft bedding, bumpers, loose blankets, and toys out of your baby's bed. It is OK if your baby rolls over without help at night.  Is likely sleeping about 6 to 8 hours in a row at night  Needs 2 to 3 naps each day  Sleeps about a total of 14 to 15 hours each day  Needs to learn how to fall asleep without help. Put your baby to bed while still awake. Your baby may cry. Check on your baby every 10 minutes or so until your baby falls asleep. Your baby will slowly learn to fall asleep.  Should not have a bottle in bed. This can cause tooth decay or ear infections. Give a bottle before putting your baby in the crib for the night.  Should sleep in a crib that is away from windows.  Shots or vaccines ? It is important for your baby to get shots on time. This protects from very serious illnesses like  lung infections, meningitis, or infections that damage their nervous system. Your baby may need:  DTaP or diphtheria, tetanus, and pertussis vaccine  Hib or Haemophilus influenzae type b vaccine  IPV or polio vaccine  PCV or pneumococcal conjugate vaccine  RV or rotavirus vaccine  HepB or hepatitis B vaccine  Influenza vaccine  Some of these vaccines may be given as combined vaccines. This means your child may get fewer shots.  Help for Parents   Play with your baby.  Tummy time is still important. It helps your baby develop arm and shoulder muscles. Do tummy time a few times each day while your baby is awake. Put a colorful toy in front of your baby to give something to look at or play with.  Read to your baby. Talk and sing to your baby. This helps your baby learn language skills.  Give your child toys that are safe to chew on. Most things will end up in your child's mouth, so keep away small objects and plastic bags.  Play peekaboo with your baby.  Here are some things you can do to help keep your baby safe and healthy.  Do not allow anyone to smoke in your home or around your baby. Second hand smoke can harm your baby.  Have the right size car seat for your baby and use it every time your baby is in the car. Your baby should be rear facing until 2 years of age.  Keep one hand on the baby whenever you are changing a diaper or clothes.  Keep your baby in the shade, rather than in the sun. Doctors don’t recommend sunscreen until children are 6 months and older.  Take extra care if your baby is in the kitchen.  Make sure you use the back burners on the stove and turn pot handles so your baby cannot grab them.  Keep hot items like liquids, coffee pots, and heaters away from your baby.  Put childproof locks on cabinets, especially those that contain cleaning supplies or other things that may harm your baby.  Limit how much time your baby spends in an infant seat, bouncy seat, boppy chair, or swing. Give your baby a  safe place to play.  Remove or protect sharp edge furniture where your child plays.  Use safety latches on drawers and cabinets.  Keep cords from shades and blinds away as they can strangle your child.  Never leave your baby alone. Do not leave your child in the car, in the bath, or at home alone, even for a few minutes.  Avoid screen time for children under 2 years old. This means no TV, computers, or video games. They can cause problems with brain development.  Parents need to think about:  How you will handle a sick child. Do you have alternate day care plans? Can you take off work or school?  How to childproof your home. Look for areas that may be a danger to a young child. Keep choking hazards, poisons, and hot objects out of a child's reach.  Do you live in an older home that may need to be tested for lead?  Your next well child visit will most likely be when your baby is 9 months old. At this visit your doctor may:  Do a full check up on your baby  Talk about how your baby is sleeping and eating  Give your baby the next set of shots  Get their vision checked.         When do I need to call the doctor?   Fever of 100.4°F (38°C) or higher  Having problems eating or spits up a lot  Sleeps all the time or has trouble sleeping  Won't stop crying  You are worried about your baby's development  Last Reviewed Date   2021-05-07  Consumer Information Use and Disclaimer   This generalized information is a limited summary of diagnosis, treatment, and/or medication information. It is not meant to be comprehensive and should be used as a tool to help the user understand and/or assess potential diagnostic and treatment options. It does NOT include all information about conditions, treatments, medications, side effects, or risks that may apply to a specific patient. It is not intended to be medical advice or a substitute for the medical advice, diagnosis, or treatment of a health care provider based on the health care  provider's examination and assessment of a patient’s specific and unique circumstances. Patients must speak with a health care provider for complete information about their health, medical questions, and treatment options, including any risks or benefits regarding use of medications. This information does not endorse any treatments or medications as safe, effective, or approved for treating a specific patient. [employer] and its affiliates disclaim any warranty or liability relating to this information or the use thereof. The use of this information is governed by the Terms of Use, available at https://www.[employer].com/en/know/clinical-effectiveness-terms   Copyright   Copyright © 2024 [employer] and its affiliates and/or licensors. All rights reserved.     60

## 2024-02-26 NOTE — PROGRESS NOTE ADULT - ASSESSMENT
A 60 yo male with PMH of CKD stage 4, Paraplegia PVD s/p right side AKA and left BKA, paraplegia with multiple chronic bilateral buttock and sacral stage 4 ulcers, suprapubic catheter, hypothyroidism, opioid dependance was sent from Nursing home for worsening renal function and hyperkalemia. .    A/P:   Progressive CKD 4:  Hyperkalemia improved K 4.5  Cr 4.3   Nephrology follow up, no indication for hemodialysis.     Infected chronic sacral ulcer stage 4:   Sepsis on admission. WBC improved.   On Doxycycline 100mg BID, Flagyl 500mg TID and Rocephin 1g q 24hrs.   ID follow up, pending burn evaluation for possible debridement.     Anemia of chronic kidney diease  Hb 8.1 stable, Iron total: 27, Iron sat 30% Nov 2019. B12 and Folate normal.     PVD s/p right AKA and left BKA    Hypothyroidism  Continue Synthroid    Opioid dependence:   Continue Methadone and Oxycodone.     Esophageal candidiasis  Continue fluconazole    Severe protein calories malnutrition:   Start on Ensure, dietitian evaluation.     #Progress Note Handoff:  Pending (specify):  Burn consult  Family discussion:  Disposition: Home with home service vs SNF A 58 yo male with PMH of CKD stage 4, Paraplegia PVD s/p bilateral BKA paraplegia with multiple chronic bilateral buttock and sacral stage 4 ulcers, suprapubic catheter, hypothyroidism, opioid dependance was sent from Nursing home for worsening renal function and hyperkalemia. .    A/P:   Progressive CKD 4:  Hyperkalemia improved K 4.5  Cr 4.3   Nephrology follow up, no indication for hemodialysis.     Infected chronic sacral ulcer stage 4:   Sepsis on admission. WBC improved.   On Doxycycline 100mg BID, Flagyl 500mg TID and Rocephin 1g q 24hrs.   ID follow up, pending burn evaluation for possible debridement.     Anemia of chronic kidney diease  Hb 8.1 stable, Iron total: 27, Iron sat 30% Nov 2019. B12 and Folate normal.     PVD s/p bilateral below knee amputation.     Hypothyroidism  Continue Synthroid    Opioid dependence:   Continue Methadone and Oxycodone.     Esophageal candidiasis  Continue fluconazole    Severe protein calories malnutrition:   Start on Ensure, dietitian evaluation.     #Progress Note Handoff:  Pending (specify):  Burn consult  Family discussion:  Disposition: Home with home service vs SNF Abdominal Pain, N/V/D

## 2024-05-01 NOTE — PROGRESS NOTE ADULT - SUBJECTIVE AND OBJECTIVE BOX
Please make a follow up appointment to discuss the results of your sleep study. If this is impossible for some reason, please send me a \"My Chart\" message so that I may get back with you in a timely manner.    The [facility] Lab is located in the [facility], adjacent to [facility]. The lab is on the second floor. The direct number to call for sleep study related questions is: [phone].    Please call our clinic back at [phone] or send a message on [employer] if you have any questions or concerns or if you are experiencing any of the following:     You have not received a follow up appointment within 30 days prior the recommended follow up time.    If you are not tolerating treatment plan and/or not able to obtain equipment or prescribed medication(s).  if you are experiencing any difficulties with the Durable Medical Equipment  (DME) Company you may be using or is assigned to you.  Two weeks have passed and you have not received an appointment for a scheduled procedure.  Two weeks have passed since you underwent a test and/or procedure and you have not received your results.     If you are using a CPAP/BIPAP, or Home Ventilator Device- Please note the following.  Currently, many DMEs are experiencing supply chain difficulties and orders for equipment may be back logged several weeks.     Your  Durable Medical Equipment (DME ) company is supposed to provide you with replacement filters, tubing and masks. You can either call your DME when you need new supplies or you can arrange for an automatic shipment schedule.    Your need to be seen by our office at lat minimum of every 12 months in order to renew the prescription for these supplies.   Please make note of who your DME company is and their phone number.   Please make sure that you clean your mask and hosing on a regular basis.  Your DME can provide you with additional information regarding proper care and cleaning of your  Nephrology progress note    Patient is seen and examined, events over the last 24 h noted .    Allergies:  sulfamethizole (Unknown)    Hospital Medications:   MEDICATIONS  (STANDING):  ALPRAZolam 2 milliGRAM(s) Oral at bedtime  cefTRIAXone   IVPB 2 Gram(s) IV Intermittent every 24 hours  chlorhexidine 4% Liquid 1 Application(s) Topical <User Schedule>  dextrose 5% + sodium chloride 0.45%. 1000 milliLiter(s) (50 mL/Hr) IV Continuous <Continuous>  diazepam    Tablet 10 milliGRAM(s) Oral two times a day  docusate sodium 100 milliGRAM(s) Oral three times a day  dronabinol 2.5 milliGRAM(s) Oral daily  heparin  Injectable 5000 Unit(s) SubCutaneous every 8 hours  insulin lispro (HumaLOG) corrective regimen sliding scale   SubCutaneous three times a day before meals  methadone    Tablet 10 milliGRAM(s) Oral daily  metroNIDAZOLE  IVPB 500 milliGRAM(s) IV Intermittent every 8 hours  oxybutynin 5 milliGRAM(s) Oral daily  pantoprazole    Tablet 40 milliGRAM(s) Oral before breakfast  sevelamer carbonate 800 milliGRAM(s) Oral three times a day with meals  sodium bicarbonate 650 milliGRAM(s) Oral three times a day        VITALS:  T(F): 96.5 (06-12-19 @ 06:55), Max: 96.9 (06-11-19 @ 12:30)  HR: 77 (06-12-19 @ 06:55)  BP: 114/65 (06-12-19 @ 06:55)  RR: 18 (06-12-19 @ 06:55)  SpO2: --  Wt(kg): --    06-10 @ 07:01  -  06-11 @ 07:00  --------------------------------------------------------  IN: 100 mL / OUT: 2650 mL / NET: -2550 mL    06-11 @ 07:01  -  06-12 @ 07:00  --------------------------------------------------------  IN: 0 mL / OUT: 2075 mL / NET: -2075 mL          PHYSICAL EXAM:  Constitutional: NAD  HEENT: anicteric sclera, oropharynx clear, MMM  Neck: No JVD  Respiratory: CTAB, no wheezes, rales or rhonchi  Cardiovascular: S1, S2, RRR  Gastrointestinal: BS+, soft, NT/ND  Extremities: No cyanosis or clubbing. No peripheral edema  Neurological: A/O x 3, no focal deficits  : No CVA tenderness. No brown.   Skin: No rashes  Vascular Access:    LABS:  06-11    139  |  111<H>  |  44<H>  ----------------------------<  78  5.6<H>   |  18  |  4.2<HH>  Creatinine Trend: 4.2<--, 3.9<--, 4.2<--, 4.1<--, 4.0<--, 4.2<--    Ca    7.5<L>      11 Jun 2019 08:37  Phos  7.0     06-11  Mg     1.5     06-11                            8.0    9.78  )-----------( 413      ( 11 Jun 2019 08:37 )             26.3       Urine Studies:      RADIOLOGY & ADDITIONAL STUDIES:  < from: US Retroperitoneal Complete (06.04.19 @ 15:37) >    No hydronephrosis or renal calculus.  Bilateral echogenic kidneys compatible with medical renal disease.  Nondiagnostic evaluation of the urinary bladder which is collapsed around   a catheter.    < end of copied text >  < from: Xray Chest 1 View- PORTABLE-Routine (06.08.19 @ 08:09) >    Left basilar focal atelectasis.    < end of copied text > Nephrology progress note    Patient is seen and examined, events over the last 24 h noted .  Pt lost his IV access, off IVF  Allergies:  sulfamethizole (Unknown)    Hospital Medications:   MEDICATIONS  (STANDING):  ALPRAZolam 2 milliGRAM(s) Oral at bedtime  cefTRIAXone   IVPB 2 Gram(s) IV Intermittent every 24 hours  chlorhexidine 4% Liquid 1 Application(s) Topical <User Schedule>  dextrose 5% + sodium chloride 0.45%. 1000 milliLiter(s) (50 mL/Hr) IV Continuous <Continuous>  diazepam    Tablet 10 milliGRAM(s) Oral two times a day  docusate sodium 100 milliGRAM(s) Oral three times a day  dronabinol 2.5 milliGRAM(s) Oral daily  heparin  Injectable 5000 Unit(s) SubCutaneous every 8 hours  insulin lispro (HumaLOG) corrective regimen sliding scale   SubCutaneous three times a day before meals  methadone    Tablet 10 milliGRAM(s) Oral daily  metroNIDAZOLE  IVPB 500 milliGRAM(s) IV Intermittent every 8 hours  oxybutynin 5 milliGRAM(s) Oral daily  pantoprazole    Tablet 40 milliGRAM(s) Oral before breakfast  sevelamer carbonate 800 milliGRAM(s) Oral three times a day with meals  sodium bicarbonate 650 milliGRAM(s) Oral three times a day        VITALS:  T(F): 96.5 (06-12-19 @ 06:55), Max: 96.9 (06-11-19 @ 12:30)  HR: 77 (06-12-19 @ 06:55)  BP: 114/65 (06-12-19 @ 06:55)  RR: 18 (06-12-19 @ 06:55)  SpO2: --  Wt(kg): --    06-10 @ 07:01  -  06-11 @ 07:00  --------------------------------------------------------  IN: 100 mL / OUT: 2650 mL / NET: -2550 mL    06-11 @ 07:01  -  06-12 @ 07:00  --------------------------------------------------------  IN: 0 mL / OUT: 2075 mL / NET: -2075 mL          PHYSICAL EXAM:  Constitutional: NAD  HEENT: anicteric sclera,  MMM  Neck: No JVD  Respiratory: CTAB, no wheezes, rales or rhonchi  Cardiovascular: S1, S2, RRR  Gastrointestinal: BS+, soft, NT/ND  Extremities: Rt BKA No edema  Neurological: A/O x 3, no focal deficits  : No CVA tenderness. SPC cath.   Skin: No rashes  Vascular Access:    LABS:  06-11    139  |  111<H>  |  44<H>  ----------------------------<  78  5.6<H>   |  18  |  4.2<HH>  Creatinine Trend: 4.2<--, 3.9<--, 4.2<--, 4.1<--, 4.0<--, 4.2<--    Ca    7.5<L>      11 Jun 2019 08:37  Phos  7.0     06-11  Mg     1.5     06-11                            8.0    9.78  )-----------( 413      ( 11 Jun 2019 08:37 )             26.3       Urine Studies:      RADIOLOGY & ADDITIONAL STUDIES:  < from: US Retroperitoneal Complete (06.04.19 @ 15:37) >    No hydronephrosis or renal calculus.  Bilateral echogenic kidneys compatible with medical renal disease.  Nondiagnostic evaluation of the urinary bladder which is collapsed around   a catheter.    < end of copied text >  < from: Xray Chest 1 View- PORTABLE-Routine (06.08.19 @ 08:09) >    Left basilar focal atelectasis.    < end of copied text >

## 2025-01-13 NOTE — ED BEHAVIORAL HEALTH ASSESSMENT NOTE - ABUSE / TRAUMA HISTORY
Refill Request       Last Seen: Last Seen Department: 7/26/2024  Last Seen by PCP: 7/26/2024    Last Written: 10/21/24 2 mL with 2    Next Appointment:   Future Appointments   Date Time Provider Department Center   1/27/2025  9:00 AM Elsi Causey MD [facility] DEP       Future appointment scheduled      Requested Prescriptions     Pending Prescriptions Disp Refills    MOUNJARO 5 MG/0.5ML SOAJ [Pharmacy Med Name: Mounjaro 5 MG/0.5ML Subcutaneous Solution Pen-injector] 4 mL 0     Sig: INJECT 1 SYRINGE SUBCUTANEOUSLY ONCE A WEEK      No
